# Patient Record
Sex: FEMALE | Race: BLACK OR AFRICAN AMERICAN | NOT HISPANIC OR LATINO | Employment: OTHER | URBAN - METROPOLITAN AREA
[De-identification: names, ages, dates, MRNs, and addresses within clinical notes are randomized per-mention and may not be internally consistent; named-entity substitution may affect disease eponyms.]

---

## 2020-09-25 ENCOUNTER — LAB VISIT (OUTPATIENT)
Dept: LAB | Facility: OTHER | Age: 60
End: 2020-09-25
Payer: MEDICARE

## 2020-09-25 DIAGNOSIS — Z03.818 ENCOUNTER FOR OBSERVATION FOR SUSPECTED EXPOSURE TO OTHER BIOLOGICAL AGENTS RULED OUT: ICD-10-CM

## 2020-09-25 PROCEDURE — U0003 INFECTIOUS AGENT DETECTION BY NUCLEIC ACID (DNA OR RNA); SEVERE ACUTE RESPIRATORY SYNDROME CORONAVIRUS 2 (SARS-COV-2) (CORONAVIRUS DISEASE [COVID-19]), AMPLIFIED PROBE TECHNIQUE, MAKING USE OF HIGH THROUGHPUT TECHNOLOGIES AS DESCRIBED BY CMS-2020-01-R: HCPCS

## 2020-09-26 LAB — SARS-COV-2 RNA RESP QL NAA+PROBE: NOT DETECTED

## 2020-10-02 ENCOUNTER — LAB VISIT (OUTPATIENT)
Dept: LAB | Facility: OTHER | Age: 60
End: 2020-10-02
Payer: OTHER GOVERNMENT

## 2020-10-02 DIAGNOSIS — Z03.818 ENCOUNTER FOR OBSERVATION FOR SUSPECTED EXPOSURE TO OTHER BIOLOGICAL AGENTS RULED OUT: ICD-10-CM

## 2020-10-02 PROCEDURE — U0003 INFECTIOUS AGENT DETECTION BY NUCLEIC ACID (DNA OR RNA); SEVERE ACUTE RESPIRATORY SYNDROME CORONAVIRUS 2 (SARS-COV-2) (CORONAVIRUS DISEASE [COVID-19]), AMPLIFIED PROBE TECHNIQUE, MAKING USE OF HIGH THROUGHPUT TECHNOLOGIES AS DESCRIBED BY CMS-2020-01-R: HCPCS

## 2020-10-04 LAB — SARS-COV-2 RNA RESP QL NAA+PROBE: NOT DETECTED

## 2020-10-09 ENCOUNTER — LAB VISIT (OUTPATIENT)
Dept: LAB | Facility: OTHER | Age: 60
End: 2020-10-09
Attending: INTERNAL MEDICINE
Payer: MEDICARE

## 2020-10-09 DIAGNOSIS — Z03.818 ENCOUNTER FOR OBSERVATION FOR SUSPECTED EXPOSURE TO OTHER BIOLOGICAL AGENTS RULED OUT: ICD-10-CM

## 2020-10-09 PROCEDURE — U0003 INFECTIOUS AGENT DETECTION BY NUCLEIC ACID (DNA OR RNA); SEVERE ACUTE RESPIRATORY SYNDROME CORONAVIRUS 2 (SARS-COV-2) (CORONAVIRUS DISEASE [COVID-19]), AMPLIFIED PROBE TECHNIQUE, MAKING USE OF HIGH THROUGHPUT TECHNOLOGIES AS DESCRIBED BY CMS-2020-01-R: HCPCS

## 2020-10-12 LAB — SARS-COV-2 RNA RESP QL NAA+PROBE: NOT DETECTED

## 2020-10-16 ENCOUNTER — LAB VISIT (OUTPATIENT)
Dept: LAB | Facility: OTHER | Age: 60
End: 2020-10-16
Payer: MEDICARE

## 2020-10-16 DIAGNOSIS — Z03.818 ENCOUNTER FOR OBSERVATION FOR SUSPECTED EXPOSURE TO OTHER BIOLOGICAL AGENTS RULED OUT: ICD-10-CM

## 2020-10-16 PROCEDURE — U0003 INFECTIOUS AGENT DETECTION BY NUCLEIC ACID (DNA OR RNA); SEVERE ACUTE RESPIRATORY SYNDROME CORONAVIRUS 2 (SARS-COV-2) (CORONAVIRUS DISEASE [COVID-19]), AMPLIFIED PROBE TECHNIQUE, MAKING USE OF HIGH THROUGHPUT TECHNOLOGIES AS DESCRIBED BY CMS-2020-01-R: HCPCS

## 2020-10-17 LAB — SARS-COV-2 RNA RESP QL NAA+PROBE: NOT DETECTED

## 2020-10-23 ENCOUNTER — LAB VISIT (OUTPATIENT)
Dept: LAB | Facility: OTHER | Age: 60
End: 2020-10-23
Attending: INTERNAL MEDICINE
Payer: MEDICARE

## 2020-10-23 DIAGNOSIS — Z03.818 ENCOUNTER FOR OBSERVATION FOR SUSPECTED EXPOSURE TO OTHER BIOLOGICAL AGENTS RULED OUT: ICD-10-CM

## 2020-10-23 PROCEDURE — U0003 INFECTIOUS AGENT DETECTION BY NUCLEIC ACID (DNA OR RNA); SEVERE ACUTE RESPIRATORY SYNDROME CORONAVIRUS 2 (SARS-COV-2) (CORONAVIRUS DISEASE [COVID-19]), AMPLIFIED PROBE TECHNIQUE, MAKING USE OF HIGH THROUGHPUT TECHNOLOGIES AS DESCRIBED BY CMS-2020-01-R: HCPCS

## 2020-10-24 LAB — SARS-COV-2 RNA RESP QL NAA+PROBE: NOT DETECTED

## 2020-10-30 ENCOUNTER — LAB VISIT (OUTPATIENT)
Dept: LAB | Facility: OTHER | Age: 60
End: 2020-10-30
Payer: MEDICARE

## 2020-10-30 DIAGNOSIS — Z03.818 ENCOUNTER FOR OBSERVATION FOR SUSPECTED EXPOSURE TO OTHER BIOLOGICAL AGENTS RULED OUT: ICD-10-CM

## 2020-10-30 PROCEDURE — U0003 INFECTIOUS AGENT DETECTION BY NUCLEIC ACID (DNA OR RNA); SEVERE ACUTE RESPIRATORY SYNDROME CORONAVIRUS 2 (SARS-COV-2) (CORONAVIRUS DISEASE [COVID-19]), AMPLIFIED PROBE TECHNIQUE, MAKING USE OF HIGH THROUGHPUT TECHNOLOGIES AS DESCRIBED BY CMS-2020-01-R: HCPCS

## 2020-10-31 LAB — SARS-COV-2 RNA RESP QL NAA+PROBE: NOT DETECTED

## 2020-11-27 ENCOUNTER — LAB VISIT (OUTPATIENT)
Dept: LAB | Facility: OTHER | Age: 60
End: 2020-11-27
Payer: MEDICARE

## 2020-11-27 DIAGNOSIS — Z03.818 ENCOUNTER FOR OBSERVATION FOR SUSPECTED EXPOSURE TO OTHER BIOLOGICAL AGENTS RULED OUT: ICD-10-CM

## 2020-11-27 PROCEDURE — U0003 INFECTIOUS AGENT DETECTION BY NUCLEIC ACID (DNA OR RNA); SEVERE ACUTE RESPIRATORY SYNDROME CORONAVIRUS 2 (SARS-COV-2) (CORONAVIRUS DISEASE [COVID-19]), AMPLIFIED PROBE TECHNIQUE, MAKING USE OF HIGH THROUGHPUT TECHNOLOGIES AS DESCRIBED BY CMS-2020-01-R: HCPCS

## 2020-11-28 LAB — SARS-COV-2 RNA RESP QL NAA+PROBE: NOT DETECTED

## 2020-12-04 ENCOUNTER — LAB VISIT (OUTPATIENT)
Dept: LAB | Facility: OTHER | Age: 60
End: 2020-12-04
Payer: MEDICARE

## 2020-12-04 DIAGNOSIS — Z03.818 ENCOUNTER FOR OBSERVATION FOR SUSPECTED EXPOSURE TO OTHER BIOLOGICAL AGENTS RULED OUT: ICD-10-CM

## 2020-12-04 PROCEDURE — U0003 INFECTIOUS AGENT DETECTION BY NUCLEIC ACID (DNA OR RNA); SEVERE ACUTE RESPIRATORY SYNDROME CORONAVIRUS 2 (SARS-COV-2) (CORONAVIRUS DISEASE [COVID-19]), AMPLIFIED PROBE TECHNIQUE, MAKING USE OF HIGH THROUGHPUT TECHNOLOGIES AS DESCRIBED BY CMS-2020-01-R: HCPCS

## 2020-12-06 LAB — SARS-COV-2 RNA RESP QL NAA+PROBE: NOT DETECTED

## 2020-12-11 ENCOUNTER — LAB VISIT (OUTPATIENT)
Dept: LAB | Facility: OTHER | Age: 60
End: 2020-12-11
Payer: OTHER GOVERNMENT

## 2020-12-11 DIAGNOSIS — Z03.818 ENCOUNTER FOR OBSERVATION FOR SUSPECTED EXPOSURE TO OTHER BIOLOGICAL AGENTS RULED OUT: ICD-10-CM

## 2020-12-11 PROCEDURE — U0003 INFECTIOUS AGENT DETECTION BY NUCLEIC ACID (DNA OR RNA); SEVERE ACUTE RESPIRATORY SYNDROME CORONAVIRUS 2 (SARS-COV-2) (CORONAVIRUS DISEASE [COVID-19]), AMPLIFIED PROBE TECHNIQUE, MAKING USE OF HIGH THROUGHPUT TECHNOLOGIES AS DESCRIBED BY CMS-2020-01-R: HCPCS

## 2020-12-14 LAB — SARS-COV-2 RNA RESP QL NAA+PROBE: NOT DETECTED

## 2020-12-18 ENCOUNTER — LAB VISIT (OUTPATIENT)
Dept: LAB | Facility: OTHER | Age: 60
End: 2020-12-18
Payer: OTHER GOVERNMENT

## 2020-12-18 DIAGNOSIS — Z03.818 ENCOUNTER FOR OBSERVATION FOR SUSPECTED EXPOSURE TO OTHER BIOLOGICAL AGENTS RULED OUT: ICD-10-CM

## 2020-12-18 PROCEDURE — U0003 INFECTIOUS AGENT DETECTION BY NUCLEIC ACID (DNA OR RNA); SEVERE ACUTE RESPIRATORY SYNDROME CORONAVIRUS 2 (SARS-COV-2) (CORONAVIRUS DISEASE [COVID-19]), AMPLIFIED PROBE TECHNIQUE, MAKING USE OF HIGH THROUGHPUT TECHNOLOGIES AS DESCRIBED BY CMS-2020-01-R: HCPCS

## 2020-12-20 LAB — SARS-COV-2 RNA RESP QL NAA+PROBE: NOT DETECTED

## 2020-12-23 ENCOUNTER — LAB VISIT (OUTPATIENT)
Dept: LAB | Facility: OTHER | Age: 60
End: 2020-12-23
Payer: OTHER GOVERNMENT

## 2020-12-23 DIAGNOSIS — Z03.818 ENCOUNTER FOR OBSERVATION FOR SUSPECTED EXPOSURE TO OTHER BIOLOGICAL AGENTS RULED OUT: ICD-10-CM

## 2020-12-23 PROCEDURE — U0003 INFECTIOUS AGENT DETECTION BY NUCLEIC ACID (DNA OR RNA); SEVERE ACUTE RESPIRATORY SYNDROME CORONAVIRUS 2 (SARS-COV-2) (CORONAVIRUS DISEASE [COVID-19]), AMPLIFIED PROBE TECHNIQUE, MAKING USE OF HIGH THROUGHPUT TECHNOLOGIES AS DESCRIBED BY CMS-2020-01-R: HCPCS

## 2020-12-25 LAB — SARS-COV-2 RNA RESP QL NAA+PROBE: NOT DETECTED

## 2020-12-30 ENCOUNTER — LAB VISIT (OUTPATIENT)
Dept: LAB | Facility: OTHER | Age: 60
End: 2020-12-30
Payer: MEDICARE

## 2020-12-30 DIAGNOSIS — Z03.818 ENCOUNTER FOR OBSERVATION FOR SUSPECTED EXPOSURE TO OTHER BIOLOGICAL AGENTS RULED OUT: ICD-10-CM

## 2020-12-30 PROCEDURE — U0003 INFECTIOUS AGENT DETECTION BY NUCLEIC ACID (DNA OR RNA); SEVERE ACUTE RESPIRATORY SYNDROME CORONAVIRUS 2 (SARS-COV-2) (CORONAVIRUS DISEASE [COVID-19]), AMPLIFIED PROBE TECHNIQUE, MAKING USE OF HIGH THROUGHPUT TECHNOLOGIES AS DESCRIBED BY CMS-2020-01-R: HCPCS

## 2021-01-01 LAB — SARS-COV-2 RNA RESP QL NAA+PROBE: NOT DETECTED

## 2021-01-08 ENCOUNTER — LAB VISIT (OUTPATIENT)
Dept: LAB | Facility: OTHER | Age: 61
End: 2021-01-08
Payer: MEDICARE

## 2021-01-08 DIAGNOSIS — Z03.818 ENCOUNTER FOR OBSERVATION FOR SUSPECTED EXPOSURE TO OTHER BIOLOGICAL AGENTS RULED OUT: ICD-10-CM

## 2021-01-08 PROCEDURE — U0003 INFECTIOUS AGENT DETECTION BY NUCLEIC ACID (DNA OR RNA); SEVERE ACUTE RESPIRATORY SYNDROME CORONAVIRUS 2 (SARS-COV-2) (CORONAVIRUS DISEASE [COVID-19]), AMPLIFIED PROBE TECHNIQUE, MAKING USE OF HIGH THROUGHPUT TECHNOLOGIES AS DESCRIBED BY CMS-2020-01-R: HCPCS

## 2021-01-10 LAB — SARS-COV-2 RNA RESP QL NAA+PROBE: NOT DETECTED

## 2021-01-15 ENCOUNTER — LAB VISIT (OUTPATIENT)
Dept: LAB | Facility: OTHER | Age: 61
End: 2021-01-15
Payer: MEDICARE

## 2021-01-15 DIAGNOSIS — Z20.822 ENCOUNTER FOR LABORATORY TESTING FOR COVID-19 VIRUS: ICD-10-CM

## 2021-01-15 PROCEDURE — U0003 INFECTIOUS AGENT DETECTION BY NUCLEIC ACID (DNA OR RNA); SEVERE ACUTE RESPIRATORY SYNDROME CORONAVIRUS 2 (SARS-COV-2) (CORONAVIRUS DISEASE [COVID-19]), AMPLIFIED PROBE TECHNIQUE, MAKING USE OF HIGH THROUGHPUT TECHNOLOGIES AS DESCRIBED BY CMS-2020-01-R: HCPCS

## 2021-01-16 LAB — SARS-COV-2 RNA RESP QL NAA+PROBE: NOT DETECTED

## 2021-01-22 ENCOUNTER — LAB VISIT (OUTPATIENT)
Dept: LAB | Facility: OTHER | Age: 61
End: 2021-01-22
Payer: MEDICARE

## 2021-01-22 DIAGNOSIS — Z20.822 ENCOUNTER FOR LABORATORY TESTING FOR COVID-19 VIRUS: ICD-10-CM

## 2021-01-22 PROCEDURE — U0003 INFECTIOUS AGENT DETECTION BY NUCLEIC ACID (DNA OR RNA); SEVERE ACUTE RESPIRATORY SYNDROME CORONAVIRUS 2 (SARS-COV-2) (CORONAVIRUS DISEASE [COVID-19]), AMPLIFIED PROBE TECHNIQUE, MAKING USE OF HIGH THROUGHPUT TECHNOLOGIES AS DESCRIBED BY CMS-2020-01-R: HCPCS

## 2021-01-23 LAB — SARS-COV-2 RNA RESP QL NAA+PROBE: NOT DETECTED

## 2021-04-21 ENCOUNTER — HOSPITAL ENCOUNTER (INPATIENT)
Facility: HOSPITAL | Age: 61
LOS: 14 days | Discharge: HOME | DRG: 871 | End: 2021-05-05
Attending: EMERGENCY MEDICINE | Admitting: INTERNAL MEDICINE
Payer: MEDICARE

## 2021-04-21 DIAGNOSIS — Z93.0 TRACHEOSTOMY DEPENDENT: ICD-10-CM

## 2021-04-21 DIAGNOSIS — T17.908A ASPIRATION INTO AIRWAY: ICD-10-CM

## 2021-04-21 DIAGNOSIS — T17.908A ASPIRATION INTO AIRWAY, INITIAL ENCOUNTER: ICD-10-CM

## 2021-04-21 DIAGNOSIS — J69.0 ASPIRATION PNEUMONIA OF RIGHT LUNG, UNSPECIFIED ASPIRATION PNEUMONIA TYPE, UNSPECIFIED PART OF LUNG: Primary | ICD-10-CM

## 2021-04-21 PROBLEM — D63.8 ANEMIA OF CHRONIC DISEASE: Status: ACTIVE | Noted: 2021-04-21

## 2021-04-21 PROBLEM — G93.1 ANOXIC BRAIN INJURY: Status: ACTIVE | Noted: 2021-04-21

## 2021-04-21 LAB
ALBUMIN SERPL BCP-MCNC: 3 G/DL (ref 3.5–5.2)
ALP SERPL-CCNC: 238 U/L (ref 55–135)
ALT SERPL W/O P-5'-P-CCNC: 77 U/L (ref 10–44)
ANION GAP SERPL CALC-SCNC: 8 MMOL/L (ref 8–16)
AST SERPL-CCNC: 48 U/L (ref 10–40)
BACTERIA #/AREA URNS HPF: ABNORMAL /HPF
BASOPHILS # BLD AUTO: 0.01 K/UL (ref 0–0.2)
BASOPHILS NFR BLD: 0.1 % (ref 0–1.9)
BILIRUB SERPL-MCNC: 0.7 MG/DL (ref 0.1–1)
BILIRUB UR QL STRIP: NEGATIVE
BUN SERPL-MCNC: 36 MG/DL (ref 6–20)
CALCIUM SERPL-MCNC: 9.9 MG/DL (ref 8.7–10.5)
CAOX CRY URNS QL MICRO: ABNORMAL
CHLORIDE SERPL-SCNC: 107 MMOL/L (ref 95–110)
CLARITY UR: ABNORMAL
CO2 SERPL-SCNC: 28 MMOL/L (ref 23–29)
COLOR UR: ABNORMAL
CREAT SERPL-MCNC: 0.7 MG/DL (ref 0.5–1.4)
CTP QC/QA: YES
DIFFERENTIAL METHOD: ABNORMAL
EOSINOPHIL # BLD AUTO: 0.1 K/UL (ref 0–0.5)
EOSINOPHIL NFR BLD: 0.7 % (ref 0–8)
ERYTHROCYTE [DISTWIDTH] IN BLOOD BY AUTOMATED COUNT: 17 % (ref 11.5–14.5)
EST. GFR  (AFRICAN AMERICAN): >60 ML/MIN/1.73 M^2
EST. GFR  (NON AFRICAN AMERICAN): >60 ML/MIN/1.73 M^2
GLUCOSE SERPL-MCNC: 73 MG/DL (ref 70–110)
GLUCOSE UR QL STRIP: NEGATIVE
HCT VFR BLD AUTO: 37.3 % (ref 37–48.5)
HGB BLD-MCNC: 11.8 G/DL (ref 12–16)
HGB UR QL STRIP: ABNORMAL
HYALINE CASTS #/AREA URNS LPF: 0 /LPF
IMM GRANULOCYTES # BLD AUTO: 0.04 K/UL (ref 0–0.04)
IMM GRANULOCYTES NFR BLD AUTO: 0.5 % (ref 0–0.5)
KETONES UR QL STRIP: NEGATIVE
LACTATE SERPL-SCNC: 0.9 MMOL/L (ref 0.5–2.2)
LACTATE SERPL-SCNC: 1.7 MMOL/L (ref 0.5–2.2)
LEUKOCYTE ESTERASE UR QL STRIP: ABNORMAL
LYMPHOCYTES # BLD AUTO: 0.9 K/UL (ref 1–4.8)
LYMPHOCYTES NFR BLD: 12.4 % (ref 18–48)
MCH RBC QN AUTO: 28.7 PG (ref 27–31)
MCHC RBC AUTO-ENTMCNC: 31.6 G/DL (ref 32–36)
MCV RBC AUTO: 91 FL (ref 82–98)
MICROSCOPIC COMMENT: ABNORMAL
MONOCYTES # BLD AUTO: 0.6 K/UL (ref 0.3–1)
MONOCYTES NFR BLD: 8 % (ref 4–15)
NEUTROPHILS # BLD AUTO: 5.8 K/UL (ref 1.8–7.7)
NEUTROPHILS NFR BLD: 78.3 % (ref 38–73)
NITRITE UR QL STRIP: NEGATIVE
NRBC BLD-RTO: 0 /100 WBC
PH UR STRIP: >8 [PH] (ref 5–8)
PLATELET # BLD AUTO: 157 K/UL (ref 150–450)
PMV BLD AUTO: 13 FL (ref 9.2–12.9)
POCT GLUCOSE: 81 MG/DL (ref 70–110)
POTASSIUM SERPL-SCNC: 4.4 MMOL/L (ref 3.5–5.1)
PROCALCITONIN SERPL IA-MCNC: 0.28 NG/ML
PROT SERPL-MCNC: 8.7 G/DL (ref 6–8.4)
PROT UR QL STRIP: ABNORMAL
RBC # BLD AUTO: 4.11 M/UL (ref 4–5.4)
RBC #/AREA URNS HPF: >100 /HPF (ref 0–4)
SARS-COV-2 RDRP RESP QL NAA+PROBE: NEGATIVE
SODIUM SERPL-SCNC: 143 MMOL/L (ref 136–145)
SP GR UR STRIP: 1.01 (ref 1–1.03)
TSH SERPL DL<=0.005 MIU/L-ACNC: 0.91 UIU/ML (ref 0.4–4)
URN SPEC COLLECT METH UR: ABNORMAL
UROBILINOGEN UR STRIP-ACNC: ABNORMAL EU/DL
WBC # BLD AUTO: 7.47 K/UL (ref 3.9–12.7)
WBC #/AREA URNS HPF: 94 /HPF (ref 0–5)
WBC CLUMPS URNS QL MICRO: ABNORMAL
YEAST URNS QL MICRO: ABNORMAL

## 2021-04-21 PROCEDURE — 87324 CLOSTRIDIUM AG IA: CPT | Performed by: EMERGENCY MEDICINE

## 2021-04-21 PROCEDURE — 87040 BLOOD CULTURE FOR BACTERIA: CPT | Performed by: EMERGENCY MEDICINE

## 2021-04-21 PROCEDURE — 51702 INSERT TEMP BLADDER CATH: CPT

## 2021-04-21 PROCEDURE — 27201112

## 2021-04-21 PROCEDURE — 99291 CRITICAL CARE FIRST HOUR: CPT | Mod: 25

## 2021-04-21 PROCEDURE — 63600175 PHARM REV CODE 636 W HCPCS: Performed by: INTERNAL MEDICINE

## 2021-04-21 PROCEDURE — 99900026 HC AIRWAY MAINTENANCE (STAT)

## 2021-04-21 PROCEDURE — 84100 ASSAY OF PHOSPHORUS: CPT | Performed by: INTERNAL MEDICINE

## 2021-04-21 PROCEDURE — 25000242 PHARM REV CODE 250 ALT 637 W/ HCPCS: Performed by: INTERNAL MEDICINE

## 2021-04-21 PROCEDURE — 81000 URINALYSIS NONAUTO W/SCOPE: CPT | Performed by: EMERGENCY MEDICINE

## 2021-04-21 PROCEDURE — 93010 ELECTROCARDIOGRAM REPORT: CPT | Mod: ,,, | Performed by: INTERNAL MEDICINE

## 2021-04-21 PROCEDURE — 87088 URINE BACTERIA CULTURE: CPT | Performed by: EMERGENCY MEDICINE

## 2021-04-21 PROCEDURE — 94640 AIRWAY INHALATION TREATMENT: CPT

## 2021-04-21 PROCEDURE — 94761 N-INVAS EAR/PLS OXIMETRY MLT: CPT

## 2021-04-21 PROCEDURE — 25000003 PHARM REV CODE 250: Performed by: EMERGENCY MEDICINE

## 2021-04-21 PROCEDURE — 27000221 HC OXYGEN, UP TO 24 HOURS

## 2021-04-21 PROCEDURE — 87449 NOS EACH ORGANISM AG IA: CPT | Performed by: EMERGENCY MEDICINE

## 2021-04-21 PROCEDURE — 93010 EKG 12-LEAD: ICD-10-PCS | Mod: ,,, | Performed by: INTERNAL MEDICINE

## 2021-04-21 PROCEDURE — 25000003 PHARM REV CODE 250: Performed by: INTERNAL MEDICINE

## 2021-04-21 PROCEDURE — 96360 HYDRATION IV INFUSION INIT: CPT | Mod: 59

## 2021-04-21 PROCEDURE — 83605 ASSAY OF LACTIC ACID: CPT | Performed by: EMERGENCY MEDICINE

## 2021-04-21 PROCEDURE — 63600175 PHARM REV CODE 636 W HCPCS: Performed by: EMERGENCY MEDICINE

## 2021-04-21 PROCEDURE — 83605 ASSAY OF LACTIC ACID: CPT | Mod: 91 | Performed by: INTERNAL MEDICINE

## 2021-04-21 PROCEDURE — 96367 TX/PROPH/DG ADDL SEQ IV INF: CPT

## 2021-04-21 PROCEDURE — 96365 THER/PROPH/DIAG IV INF INIT: CPT

## 2021-04-21 PROCEDURE — 87077 CULTURE AEROBIC IDENTIFY: CPT | Performed by: EMERGENCY MEDICINE

## 2021-04-21 PROCEDURE — 84145 PROCALCITONIN (PCT): CPT | Performed by: EMERGENCY MEDICINE

## 2021-04-21 PROCEDURE — U0002 COVID-19 LAB TEST NON-CDC: HCPCS | Performed by: EMERGENCY MEDICINE

## 2021-04-21 PROCEDURE — 25000242 PHARM REV CODE 250 ALT 637 W/ HCPCS: Performed by: EMERGENCY MEDICINE

## 2021-04-21 PROCEDURE — 87086 URINE CULTURE/COLONY COUNT: CPT | Performed by: EMERGENCY MEDICINE

## 2021-04-21 PROCEDURE — 84443 ASSAY THYROID STIM HORMONE: CPT | Performed by: INTERNAL MEDICINE

## 2021-04-21 PROCEDURE — 27200966 HC CLOSED SUCTION SYSTEM

## 2021-04-21 PROCEDURE — 80053 COMPREHEN METABOLIC PANEL: CPT | Performed by: EMERGENCY MEDICINE

## 2021-04-21 PROCEDURE — 93005 ELECTROCARDIOGRAM TRACING: CPT

## 2021-04-21 PROCEDURE — 12000002 HC ACUTE/MED SURGE SEMI-PRIVATE ROOM

## 2021-04-21 PROCEDURE — 82962 GLUCOSE BLOOD TEST: CPT

## 2021-04-21 PROCEDURE — 85025 COMPLETE CBC W/AUTO DIFF WBC: CPT | Performed by: EMERGENCY MEDICINE

## 2021-04-21 PROCEDURE — 87186 SC STD MICRODIL/AGAR DIL: CPT | Performed by: EMERGENCY MEDICINE

## 2021-04-21 PROCEDURE — 83735 ASSAY OF MAGNESIUM: CPT | Performed by: INTERNAL MEDICINE

## 2021-04-21 RX ORDER — POLYETHYLENE GLYCOL 3350 17 G/17G
17 POWDER, FOR SOLUTION ORAL DAILY PRN
COMMUNITY

## 2021-04-21 RX ORDER — IPRATROPIUM BROMIDE AND ALBUTEROL SULFATE 2.5; .5 MG/3ML; MG/3ML
3 SOLUTION RESPIRATORY (INHALATION) EVERY 6 HOURS PRN
Status: DISCONTINUED | OUTPATIENT
Start: 2021-04-21 | End: 2021-04-23

## 2021-04-21 RX ORDER — MUPIROCIN 20 MG/G
OINTMENT TOPICAL 2 TIMES DAILY
Status: COMPLETED | OUTPATIENT
Start: 2021-04-21 | End: 2021-04-26

## 2021-04-21 RX ORDER — SCOLOPAMINE TRANSDERMAL SYSTEM 1 MG/1
1 PATCH, EXTENDED RELEASE TRANSDERMAL
Status: DISCONTINUED | OUTPATIENT
Start: 2021-04-21 | End: 2021-05-06 | Stop reason: HOSPADM

## 2021-04-21 RX ORDER — FERROUS SULFATE 300 MG/5ML
300 LIQUID (ML) ORAL SEE ADMIN INSTRUCTIONS
Status: ON HOLD | COMMUNITY
End: 2023-08-01 | Stop reason: HOSPADM

## 2021-04-21 RX ORDER — LANOLIN ALCOHOL/MO/W.PET/CERES
400 CREAM (GRAM) TOPICAL DAILY
Status: ON HOLD | COMMUNITY
End: 2021-05-13 | Stop reason: HOSPADM

## 2021-04-21 RX ORDER — ERGOCALCIFEROL 1.25 MG/1
50000 CAPSULE ORAL
COMMUNITY

## 2021-04-21 RX ORDER — SCOLOPAMINE TRANSDERMAL SYSTEM 1 MG/1
1 PATCH, EXTENDED RELEASE TRANSDERMAL
Status: ON HOLD | COMMUNITY
End: 2021-05-13 | Stop reason: HOSPADM

## 2021-04-21 RX ORDER — VANCOMYCIN HCL IN 5 % DEXTROSE 1G/250ML
1000 PLASTIC BAG, INJECTION (ML) INTRAVENOUS
Status: DISCONTINUED | OUTPATIENT
Start: 2021-04-21 | End: 2021-04-24

## 2021-04-21 RX ORDER — DEXTROSE MONOHYDRATE AND SODIUM CHLORIDE 5; .9 G/100ML; G/100ML
INJECTION, SOLUTION INTRAVENOUS CONTINUOUS
Status: DISCONTINUED | OUTPATIENT
Start: 2021-04-21 | End: 2021-04-24

## 2021-04-21 RX ORDER — IPRATROPIUM BROMIDE AND ALBUTEROL SULFATE 2.5; .5 MG/3ML; MG/3ML
3 SOLUTION RESPIRATORY (INHALATION) EVERY 6 HOURS PRN
Status: ON HOLD | COMMUNITY
End: 2021-05-13 | Stop reason: SDUPTHER

## 2021-04-21 RX ORDER — ACETYLCYSTEINE 100 MG/ML
4 SOLUTION ORAL; RESPIRATORY (INHALATION) 4 TIMES DAILY
Status: DISCONTINUED | OUTPATIENT
Start: 2021-04-21 | End: 2021-04-23

## 2021-04-21 RX ORDER — LANOLIN ALCOHOL/MO/W.PET/CERES
400 CREAM (GRAM) TOPICAL DAILY
Status: DISCONTINUED | OUTPATIENT
Start: 2021-04-21 | End: 2021-05-06 | Stop reason: HOSPADM

## 2021-04-21 RX ORDER — ACETYLCYSTEINE 100 MG/ML
4 SOLUTION ORAL; RESPIRATORY (INHALATION) EVERY 8 HOURS PRN
Status: ON HOLD | COMMUNITY
End: 2021-05-13 | Stop reason: SDUPTHER

## 2021-04-21 RX ORDER — IPRATROPIUM BROMIDE AND ALBUTEROL SULFATE 2.5; .5 MG/3ML; MG/3ML
3 SOLUTION RESPIRATORY (INHALATION)
Status: COMPLETED | OUTPATIENT
Start: 2021-04-21 | End: 2021-04-21

## 2021-04-21 RX ORDER — ENOXAPARIN SODIUM 100 MG/ML
40 INJECTION SUBCUTANEOUS EVERY 24 HOURS
Status: DISCONTINUED | OUTPATIENT
Start: 2021-04-21 | End: 2021-05-06 | Stop reason: HOSPADM

## 2021-04-21 RX ADMIN — SCOPOLAMINE 1 PATCH: 1.5 PATCH, EXTENDED RELEASE TRANSDERMAL at 05:04

## 2021-04-21 RX ADMIN — IPRATROPIUM BROMIDE AND ALBUTEROL SULFATE 3 ML: .5; 3 SOLUTION RESPIRATORY (INHALATION) at 10:04

## 2021-04-21 RX ADMIN — PIPERACILLIN AND TAZOBACTAM 4.5 G: 4; .5 INJECTION, POWDER, LYOPHILIZED, FOR SOLUTION INTRAVENOUS; PARENTERAL at 09:04

## 2021-04-21 RX ADMIN — VANCOMYCIN HYDROCHLORIDE 1000 MG: 1 INJECTION, POWDER, LYOPHILIZED, FOR SOLUTION INTRAVENOUS at 11:04

## 2021-04-21 RX ADMIN — PIPERACILLIN AND TAZOBACTAM 4.5 G: 4; .5 INJECTION, POWDER, LYOPHILIZED, FOR SOLUTION INTRAVENOUS; PARENTERAL at 05:04

## 2021-04-21 RX ADMIN — IPRATROPIUM BROMIDE AND ALBUTEROL SULFATE 3 ML: .5; 3 SOLUTION RESPIRATORY (INHALATION) at 06:04

## 2021-04-21 RX ADMIN — ACETYLCYSTEINE 4 ML: 100 INHALANT RESPIRATORY (INHALATION) at 06:04

## 2021-04-21 RX ADMIN — MUPIROCIN: 20 OINTMENT TOPICAL at 11:04

## 2021-04-21 RX ADMIN — ACETYLCYSTEINE 4 ML: 100 INHALANT RESPIRATORY (INHALATION) at 11:04

## 2021-04-21 RX ADMIN — DEXTROSE AND SODIUM CHLORIDE: 5; .9 INJECTION, SOLUTION INTRAVENOUS at 08:04

## 2021-04-21 RX ADMIN — IPRATROPIUM BROMIDE AND ALBUTEROL SULFATE 3 ML: .5; 3 SOLUTION RESPIRATORY (INHALATION) at 11:04

## 2021-04-21 RX ADMIN — ENOXAPARIN SODIUM 40 MG: 40 INJECTION SUBCUTANEOUS at 05:04

## 2021-04-21 RX ADMIN — SODIUM CHLORIDE, SODIUM LACTATE, POTASSIUM CHLORIDE, AND CALCIUM CHLORIDE 1905 ML: .6; .31; .03; .02 INJECTION, SOLUTION INTRAVENOUS at 09:04

## 2021-04-21 RX ADMIN — VANCOMYCIN HYDROCHLORIDE 1250 MG: 1.25 INJECTION, POWDER, LYOPHILIZED, FOR SOLUTION INTRAVENOUS at 10:04

## 2021-04-22 PROBLEM — Z71.89 ADVANCE CARE PLANNING: Status: ACTIVE | Noted: 2021-04-22

## 2021-04-22 PROBLEM — R57.9 SHOCK: Status: ACTIVE | Noted: 2021-04-22

## 2021-04-22 PROBLEM — N39.0 UTI (URINARY TRACT INFECTION): Status: RESOLVED | Noted: 2021-04-22 | Resolved: 2021-04-22

## 2021-04-22 PROBLEM — J96.01 ACUTE RESPIRATORY FAILURE WITH HYPOXIA: Status: ACTIVE | Noted: 2021-04-22

## 2021-04-22 PROBLEM — S43.015A ANTERIOR DISLOCATION OF LEFT SHOULDER: Status: ACTIVE | Noted: 2021-04-22

## 2021-04-22 PROBLEM — N30.00 ACUTE CYSTITIS: Status: ACTIVE | Noted: 2021-04-22

## 2021-04-22 PROBLEM — N39.0 UTI (URINARY TRACT INFECTION): Status: ACTIVE | Noted: 2021-04-22

## 2021-04-22 LAB
ALLENS TEST: ABNORMAL
ANION GAP SERPL CALC-SCNC: 8 MMOL/L (ref 8–16)
BASOPHILS # BLD AUTO: 0.01 K/UL (ref 0–0.2)
BASOPHILS NFR BLD: 0.2 % (ref 0–1.9)
BUN SERPL-MCNC: 26 MG/DL (ref 6–20)
C DIFF GDH STL QL: NEGATIVE
C DIFF TOX A+B STL QL IA: NEGATIVE
CALCIUM SERPL-MCNC: 9.4 MG/DL (ref 8.7–10.5)
CHLORIDE SERPL-SCNC: 107 MMOL/L (ref 95–110)
CO2 SERPL-SCNC: 27 MMOL/L (ref 23–29)
CREAT SERPL-MCNC: 0.6 MG/DL (ref 0.5–1.4)
DELSYS: ABNORMAL
DIFFERENTIAL METHOD: ABNORMAL
EOSINOPHIL # BLD AUTO: 0.1 K/UL (ref 0–0.5)
EOSINOPHIL NFR BLD: 1.3 % (ref 0–8)
ERYTHROCYTE [DISTWIDTH] IN BLOOD BY AUTOMATED COUNT: 17 % (ref 11.5–14.5)
ERYTHROCYTE [SEDIMENTATION RATE] IN BLOOD BY WESTERGREN METHOD: 15 MM/H
EST. GFR  (AFRICAN AMERICAN): >60 ML/MIN/1.73 M^2
EST. GFR  (NON AFRICAN AMERICAN): >60 ML/MIN/1.73 M^2
FIO2: 28
FLOW: 5
GLUCOSE SERPL-MCNC: 79 MG/DL (ref 70–110)
HCO3 UR-SCNC: 28.1 MMOL/L (ref 24–28)
HCT VFR BLD AUTO: 29.4 % (ref 37–48.5)
HGB BLD-MCNC: 9.4 G/DL (ref 12–16)
IMM GRANULOCYTES # BLD AUTO: 0.01 K/UL (ref 0–0.04)
IMM GRANULOCYTES NFR BLD AUTO: 0.2 % (ref 0–0.5)
LYMPHOCYTES # BLD AUTO: 0.7 K/UL (ref 1–4.8)
LYMPHOCYTES NFR BLD: 12.4 % (ref 18–48)
MAGNESIUM SERPL-MCNC: 1.9 MG/DL (ref 1.6–2.6)
MCH RBC QN AUTO: 28.8 PG (ref 27–31)
MCHC RBC AUTO-ENTMCNC: 32 G/DL (ref 32–36)
MCV RBC AUTO: 90 FL (ref 82–98)
MODE: ABNORMAL
MONOCYTES # BLD AUTO: 0.5 K/UL (ref 0.3–1)
MONOCYTES NFR BLD: 8.5 % (ref 4–15)
NEUTROPHILS # BLD AUTO: 4.1 K/UL (ref 1.8–7.7)
NEUTROPHILS NFR BLD: 77.4 % (ref 38–73)
NRBC BLD-RTO: 1 /100 WBC
PCO2 BLDA: 42.1 MMHG (ref 35–45)
PH SMN: 7.43 [PH] (ref 7.35–7.45)
PHOSPHATE SERPL-MCNC: 3.2 MG/DL (ref 2.7–4.5)
PLATELET # BLD AUTO: 135 K/UL (ref 150–450)
PMV BLD AUTO: 11.6 FL (ref 9.2–12.9)
PO2 BLDA: 66 MMHG (ref 40–60)
POC BE: 3 MMOL/L
POC SATURATED O2: 93 % (ref 95–100)
POC TCO2: 29 MMOL/L (ref 24–29)
POCT GLUCOSE: 132 MG/DL (ref 70–110)
POTASSIUM SERPL-SCNC: 4.1 MMOL/L (ref 3.5–5.1)
RBC # BLD AUTO: 3.26 M/UL (ref 4–5.4)
SAMPLE: ABNORMAL
SITE: ABNORMAL
SODIUM SERPL-SCNC: 142 MMOL/L (ref 136–145)
SP02: 93
VANCOMYCIN TROUGH SERPL-MCNC: 18.7 UG/ML (ref 10–22)
WBC # BLD AUTO: 5.31 K/UL (ref 3.9–12.7)

## 2021-04-22 PROCEDURE — 99900035 HC TECH TIME PER 15 MIN (STAT)

## 2021-04-22 PROCEDURE — 99291 CRITICAL CARE FIRST HOUR: CPT | Mod: ,,, | Performed by: INTERNAL MEDICINE

## 2021-04-22 PROCEDURE — C1751 CATH, INF, PER/CENT/MIDLINE: HCPCS

## 2021-04-22 PROCEDURE — 94761 N-INVAS EAR/PLS OXIMETRY MLT: CPT

## 2021-04-22 PROCEDURE — 27000221 HC OXYGEN, UP TO 24 HOURS

## 2021-04-22 PROCEDURE — 25000003 PHARM REV CODE 250: Performed by: INTERNAL MEDICINE

## 2021-04-22 PROCEDURE — 63600175 PHARM REV CODE 636 W HCPCS: Performed by: INTERNAL MEDICINE

## 2021-04-22 PROCEDURE — 99223 PR INITIAL HOSPITAL CARE,LEVL III: ICD-10-PCS | Mod: ,,, | Performed by: INTERNAL MEDICINE

## 2021-04-22 PROCEDURE — 25000242 PHARM REV CODE 250 ALT 637 W/ HCPCS: Performed by: INTERNAL MEDICINE

## 2021-04-22 PROCEDURE — 99497 PR ADVNCD CARE PLAN 30 MIN: ICD-10-PCS | Mod: 25,,, | Performed by: INTERNAL MEDICINE

## 2021-04-22 PROCEDURE — 99291 PR CRITICAL CARE, E/M 30-74 MINUTES: ICD-10-PCS | Mod: ,,, | Performed by: INTERNAL MEDICINE

## 2021-04-22 PROCEDURE — 80048 BASIC METABOLIC PNL TOTAL CA: CPT | Performed by: INTERNAL MEDICINE

## 2021-04-22 PROCEDURE — 20000000 HC ICU ROOM

## 2021-04-22 PROCEDURE — 94002 VENT MGMT INPAT INIT DAY: CPT

## 2021-04-22 PROCEDURE — 94640 AIRWAY INHALATION TREATMENT: CPT

## 2021-04-22 PROCEDURE — 99223 1ST HOSP IP/OBS HIGH 75: CPT | Mod: ,,, | Performed by: INTERNAL MEDICINE

## 2021-04-22 PROCEDURE — 25000003 PHARM REV CODE 250: Performed by: STUDENT IN AN ORGANIZED HEALTH CARE EDUCATION/TRAINING PROGRAM

## 2021-04-22 PROCEDURE — 27200966 HC CLOSED SUCTION SYSTEM

## 2021-04-22 PROCEDURE — A4216 STERILE WATER/SALINE, 10 ML: HCPCS | Performed by: STUDENT IN AN ORGANIZED HEALTH CARE EDUCATION/TRAINING PROGRAM

## 2021-04-22 PROCEDURE — 25000003 PHARM REV CODE 250: Performed by: EMERGENCY MEDICINE

## 2021-04-22 PROCEDURE — 99900026 HC AIRWAY MAINTENANCE (STAT)

## 2021-04-22 PROCEDURE — 36569 INSJ PICC 5 YR+ W/O IMAGING: CPT

## 2021-04-22 PROCEDURE — 80202 ASSAY OF VANCOMYCIN: CPT | Performed by: EMERGENCY MEDICINE

## 2021-04-22 PROCEDURE — 82803 BLOOD GASES ANY COMBINATION: CPT

## 2021-04-22 PROCEDURE — 63600175 PHARM REV CODE 636 W HCPCS: Performed by: EMERGENCY MEDICINE

## 2021-04-22 PROCEDURE — 99497 ADVNCD CARE PLAN 30 MIN: CPT | Mod: 25,,, | Performed by: INTERNAL MEDICINE

## 2021-04-22 PROCEDURE — 85025 COMPLETE CBC W/AUTO DIFF WBC: CPT | Performed by: INTERNAL MEDICINE

## 2021-04-22 RX ORDER — MORPHINE SULFATE 4 MG/ML
2 INJECTION, SOLUTION INTRAMUSCULAR; INTRAVENOUS ONCE
Status: COMPLETED | OUTPATIENT
Start: 2021-04-22 | End: 2021-04-22

## 2021-04-22 RX ORDER — FENTANYL CITRATE 50 UG/ML
50 INJECTION, SOLUTION INTRAMUSCULAR; INTRAVENOUS
Status: COMPLETED | OUTPATIENT
Start: 2021-04-22 | End: 2021-04-22

## 2021-04-22 RX ORDER — SODIUM CHLORIDE 0.9 % (FLUSH) 0.9 %
10 SYRINGE (ML) INJECTION EVERY 6 HOURS
Status: DISCONTINUED | OUTPATIENT
Start: 2021-04-22 | End: 2021-05-06 | Stop reason: HOSPADM

## 2021-04-22 RX ORDER — FENTANYL CITRATE 50 UG/ML
50 INJECTION, SOLUTION INTRAMUSCULAR; INTRAVENOUS
Status: DISCONTINUED | OUTPATIENT
Start: 2021-04-25 | End: 2021-04-30

## 2021-04-22 RX ORDER — SODIUM CHLORIDE 0.9 % (FLUSH) 0.9 %
10 SYRINGE (ML) INJECTION
Status: DISCONTINUED | OUTPATIENT
Start: 2021-04-22 | End: 2021-05-06 | Stop reason: HOSPADM

## 2021-04-22 RX ORDER — NOREPINEPHRINE BITARTRATE/D5W 4MG/250ML
0-3 PLASTIC BAG, INJECTION (ML) INTRAVENOUS CONTINUOUS
Status: DISCONTINUED | OUTPATIENT
Start: 2021-04-22 | End: 2021-04-29

## 2021-04-22 RX ADMIN — MUPIROCIN: 20 OINTMENT TOPICAL at 09:04

## 2021-04-22 RX ADMIN — IPRATROPIUM BROMIDE AND ALBUTEROL SULFATE 3 ML: .5; 3 SOLUTION RESPIRATORY (INHALATION) at 08:04

## 2021-04-22 RX ADMIN — FENTANYL CITRATE 50 MCG: 50 INJECTION, SOLUTION INTRAMUSCULAR; INTRAVENOUS at 09:04

## 2021-04-22 RX ADMIN — MORPHINE SULFATE 2 MG: 4 INJECTION, SOLUTION INTRAMUSCULAR; INTRAVENOUS at 04:04

## 2021-04-22 RX ADMIN — FENTANYL CITRATE 50 MCG: 50 INJECTION, SOLUTION INTRAMUSCULAR; INTRAVENOUS at 05:04

## 2021-04-22 RX ADMIN — PIPERACILLIN AND TAZOBACTAM 4.5 G: 4; .5 INJECTION, POWDER, LYOPHILIZED, FOR SOLUTION INTRAVENOUS; PARENTERAL at 01:04

## 2021-04-22 RX ADMIN — VANCOMYCIN HYDROCHLORIDE 1000 MG: 1 INJECTION, POWDER, LYOPHILIZED, FOR SOLUTION INTRAVENOUS at 10:04

## 2021-04-22 RX ADMIN — VANCOMYCIN HYDROCHLORIDE 1000 MG: 1 INJECTION, POWDER, LYOPHILIZED, FOR SOLUTION INTRAVENOUS at 11:04

## 2021-04-22 RX ADMIN — IPRATROPIUM BROMIDE AND ALBUTEROL SULFATE 3 ML: .5; 3 SOLUTION RESPIRATORY (INHALATION) at 03:04

## 2021-04-22 RX ADMIN — FENTANYL CITRATE 50 MCG: 50 INJECTION, SOLUTION INTRAMUSCULAR; INTRAVENOUS at 08:04

## 2021-04-22 RX ADMIN — DEXTROSE AND SODIUM CHLORIDE: 5; .9 INJECTION, SOLUTION INTRAVENOUS at 01:04

## 2021-04-22 RX ADMIN — ENOXAPARIN SODIUM 40 MG: 40 INJECTION SUBCUTANEOUS at 05:04

## 2021-04-22 RX ADMIN — ACETYLCYSTEINE 4 ML: 100 INHALANT RESPIRATORY (INHALATION) at 08:04

## 2021-04-22 RX ADMIN — IPRATROPIUM BROMIDE AND ALBUTEROL SULFATE 3 ML: .5; 3 SOLUTION RESPIRATORY (INHALATION) at 11:04

## 2021-04-22 RX ADMIN — Medication 0.02 MCG/KG/MIN: at 02:04

## 2021-04-22 RX ADMIN — Medication 10 ML: at 05:04

## 2021-04-22 RX ADMIN — ACETYLCYSTEINE 4 ML: 100 INHALANT RESPIRATORY (INHALATION) at 03:04

## 2021-04-22 RX ADMIN — ACETYLCYSTEINE 4 ML: 100 INHALANT RESPIRATORY (INHALATION) at 11:04

## 2021-04-22 RX ADMIN — PIPERACILLIN AND TAZOBACTAM 4.5 G: 4; .5 INJECTION, POWDER, LYOPHILIZED, FOR SOLUTION INTRAVENOUS; PARENTERAL at 05:04

## 2021-04-22 RX ADMIN — PIPERACILLIN AND TAZOBACTAM 4.5 G: 4; .5 INJECTION, POWDER, LYOPHILIZED, FOR SOLUTION INTRAVENOUS; PARENTERAL at 09:04

## 2021-04-22 RX ADMIN — Medication 10 ML: at 11:04

## 2021-04-23 LAB
ALLENS TEST: ABNORMAL
ANION GAP SERPL CALC-SCNC: 8 MMOL/L (ref 8–16)
BACTERIA UR CULT: ABNORMAL
BASOPHILS # BLD AUTO: 0.01 K/UL (ref 0–0.2)
BASOPHILS NFR BLD: 0.1 % (ref 0–1.9)
BUN SERPL-MCNC: 16 MG/DL (ref 6–20)
CALCIUM SERPL-MCNC: 8.9 MG/DL (ref 8.7–10.5)
CHLORIDE SERPL-SCNC: 107 MMOL/L (ref 95–110)
CO2 SERPL-SCNC: 25 MMOL/L (ref 23–29)
CREAT SERPL-MCNC: 0.6 MG/DL (ref 0.5–1.4)
DELSYS: ABNORMAL
DIFFERENTIAL METHOD: ABNORMAL
EOSINOPHIL # BLD AUTO: 0.1 K/UL (ref 0–0.5)
EOSINOPHIL NFR BLD: 0.8 % (ref 0–8)
ERYTHROCYTE [DISTWIDTH] IN BLOOD BY AUTOMATED COUNT: 17.5 % (ref 11.5–14.5)
EST. GFR  (AFRICAN AMERICAN): >60 ML/MIN/1.73 M^2
EST. GFR  (NON AFRICAN AMERICAN): >60 ML/MIN/1.73 M^2
FIO2: 40
GLUCOSE SERPL-MCNC: 98 MG/DL (ref 70–110)
HCO3 UR-SCNC: 25.5 MMOL/L (ref 24–28)
HCT VFR BLD AUTO: 27.1 % (ref 37–48.5)
HGB BLD-MCNC: 8.7 G/DL (ref 12–16)
IMM GRANULOCYTES # BLD AUTO: 0.02 K/UL (ref 0–0.04)
IMM GRANULOCYTES NFR BLD AUTO: 0.3 % (ref 0–0.5)
LYMPHOCYTES # BLD AUTO: 0.9 K/UL (ref 1–4.8)
LYMPHOCYTES NFR BLD: 10.8 % (ref 18–48)
MAP: 7
MCH RBC QN AUTO: 28.8 PG (ref 27–31)
MCHC RBC AUTO-ENTMCNC: 32.1 G/DL (ref 32–36)
MCV RBC AUTO: 90 FL (ref 82–98)
MIN VOL: 8.8
MODE: ABNORMAL
MONOCYTES # BLD AUTO: 0.6 K/UL (ref 0.3–1)
MONOCYTES NFR BLD: 7.5 % (ref 4–15)
NEUTROPHILS # BLD AUTO: 6.5 K/UL (ref 1.8–7.7)
NEUTROPHILS NFR BLD: 80.5 % (ref 38–73)
NRBC BLD-RTO: 0 /100 WBC
PCO2 BLDA: 39.6 MMHG (ref 35–45)
PEEP: 5
PH SMN: 7.42 [PH] (ref 7.35–7.45)
PLATELET # BLD AUTO: 150 K/UL (ref 150–450)
PMV BLD AUTO: 11.8 FL (ref 9.2–12.9)
PO2 BLDA: 101 MMHG (ref 80–100)
POC BE: 1 MMOL/L
POC SATURATED O2: 98 % (ref 95–100)
POC TCO2: 27 MMOL/L (ref 23–27)
POCT GLUCOSE: 175 MG/DL (ref 70–110)
POTASSIUM SERPL-SCNC: 3.8 MMOL/L (ref 3.5–5.1)
PS: 5
RBC # BLD AUTO: 3.02 M/UL (ref 4–5.4)
SAMPLE: ABNORMAL
SITE: ABNORMAL
SODIUM SERPL-SCNC: 140 MMOL/L (ref 136–145)
SP02: 99
SPONT RATE: 32
WBC # BLD AUTO: 8 K/UL (ref 3.9–12.7)

## 2021-04-23 PROCEDURE — 94761 N-INVAS EAR/PLS OXIMETRY MLT: CPT

## 2021-04-23 PROCEDURE — 94640 AIRWAY INHALATION TREATMENT: CPT

## 2021-04-23 PROCEDURE — 63600175 PHARM REV CODE 636 W HCPCS: Performed by: INTERNAL MEDICINE

## 2021-04-23 PROCEDURE — 87205 SMEAR GRAM STAIN: CPT | Performed by: STUDENT IN AN ORGANIZED HEALTH CARE EDUCATION/TRAINING PROGRAM

## 2021-04-23 PROCEDURE — A4216 STERILE WATER/SALINE, 10 ML: HCPCS | Performed by: STUDENT IN AN ORGANIZED HEALTH CARE EDUCATION/TRAINING PROGRAM

## 2021-04-23 PROCEDURE — 99291 CRITICAL CARE FIRST HOUR: CPT | Mod: ,,, | Performed by: INTERNAL MEDICINE

## 2021-04-23 PROCEDURE — 25000242 PHARM REV CODE 250 ALT 637 W/ HCPCS: Performed by: INTERNAL MEDICINE

## 2021-04-23 PROCEDURE — 87186 SC STD MICRODIL/AGAR DIL: CPT | Mod: 59 | Performed by: STUDENT IN AN ORGANIZED HEALTH CARE EDUCATION/TRAINING PROGRAM

## 2021-04-23 PROCEDURE — 99900035 HC TECH TIME PER 15 MIN (STAT)

## 2021-04-23 PROCEDURE — 25000003 PHARM REV CODE 250: Performed by: STUDENT IN AN ORGANIZED HEALTH CARE EDUCATION/TRAINING PROGRAM

## 2021-04-23 PROCEDURE — 80048 BASIC METABOLIC PNL TOTAL CA: CPT | Performed by: INTERNAL MEDICINE

## 2021-04-23 PROCEDURE — 94003 VENT MGMT INPAT SUBQ DAY: CPT

## 2021-04-23 PROCEDURE — 82803 BLOOD GASES ANY COMBINATION: CPT

## 2021-04-23 PROCEDURE — 85025 COMPLETE CBC W/AUTO DIFF WBC: CPT | Performed by: INTERNAL MEDICINE

## 2021-04-23 PROCEDURE — 36600 WITHDRAWAL OF ARTERIAL BLOOD: CPT

## 2021-04-23 PROCEDURE — 20000000 HC ICU ROOM

## 2021-04-23 PROCEDURE — 63600175 PHARM REV CODE 636 W HCPCS: Performed by: EMERGENCY MEDICINE

## 2021-04-23 PROCEDURE — 99900026 HC AIRWAY MAINTENANCE (STAT)

## 2021-04-23 PROCEDURE — 25000003 PHARM REV CODE 250: Performed by: EMERGENCY MEDICINE

## 2021-04-23 PROCEDURE — 25000003 PHARM REV CODE 250: Performed by: INTERNAL MEDICINE

## 2021-04-23 PROCEDURE — 99291 PR CRITICAL CARE, E/M 30-74 MINUTES: ICD-10-PCS | Mod: ,,, | Performed by: INTERNAL MEDICINE

## 2021-04-23 PROCEDURE — 27000221 HC OXYGEN, UP TO 24 HOURS

## 2021-04-23 PROCEDURE — 87070 CULTURE OTHR SPECIMN AEROBIC: CPT | Performed by: STUDENT IN AN ORGANIZED HEALTH CARE EDUCATION/TRAINING PROGRAM

## 2021-04-23 PROCEDURE — 87077 CULTURE AEROBIC IDENTIFY: CPT | Performed by: STUDENT IN AN ORGANIZED HEALTH CARE EDUCATION/TRAINING PROGRAM

## 2021-04-23 RX ORDER — IPRATROPIUM BROMIDE AND ALBUTEROL SULFATE 2.5; .5 MG/3ML; MG/3ML
3 SOLUTION RESPIRATORY (INHALATION) EVERY 6 HOURS
Status: DISCONTINUED | OUTPATIENT
Start: 2021-04-23 | End: 2021-05-01

## 2021-04-23 RX ADMIN — Medication 0.05 MCG/KG/MIN: at 05:04

## 2021-04-23 RX ADMIN — DEXTROSE AND SODIUM CHLORIDE: 5; .9 INJECTION, SOLUTION INTRAVENOUS at 04:04

## 2021-04-23 RX ADMIN — PIPERACILLIN AND TAZOBACTAM 4.5 G: 4; .5 INJECTION, POWDER, LYOPHILIZED, FOR SOLUTION INTRAVENOUS; PARENTERAL at 05:04

## 2021-04-23 RX ADMIN — ENOXAPARIN SODIUM 40 MG: 40 INJECTION SUBCUTANEOUS at 05:04

## 2021-04-23 RX ADMIN — Medication 0.05 MCG/KG/MIN: at 12:04

## 2021-04-23 RX ADMIN — MAGNESIUM OXIDE 400 MG (241.3 MG MAGNESIUM) TABLET 400 MG: TABLET at 09:04

## 2021-04-23 RX ADMIN — MUPIROCIN: 20 OINTMENT TOPICAL at 09:04

## 2021-04-23 RX ADMIN — PIPERACILLIN AND TAZOBACTAM 4.5 G: 4; .5 INJECTION, POWDER, LYOPHILIZED, FOR SOLUTION INTRAVENOUS; PARENTERAL at 01:04

## 2021-04-23 RX ADMIN — Medication 10 ML: at 05:04

## 2021-04-23 RX ADMIN — ACETYLCYSTEINE 4 ML: 100 INHALANT RESPIRATORY (INHALATION) at 07:04

## 2021-04-23 RX ADMIN — IPRATROPIUM BROMIDE AND ALBUTEROL SULFATE 3 ML: .5; 3 SOLUTION RESPIRATORY (INHALATION) at 07:04

## 2021-04-23 RX ADMIN — PIPERACILLIN AND TAZOBACTAM 4.5 G: 4; .5 INJECTION, POWDER, LYOPHILIZED, FOR SOLUTION INTRAVENOUS; PARENTERAL at 09:04

## 2021-04-23 RX ADMIN — VANCOMYCIN HYDROCHLORIDE 1000 MG: 1 INJECTION, POWDER, LYOPHILIZED, FOR SOLUTION INTRAVENOUS at 10:04

## 2021-04-23 RX ADMIN — VANCOMYCIN HYDROCHLORIDE 1000 MG: 1 INJECTION, POWDER, LYOPHILIZED, FOR SOLUTION INTRAVENOUS at 09:04

## 2021-04-24 LAB
ANION GAP SERPL CALC-SCNC: 8 MMOL/L (ref 8–16)
BUN SERPL-MCNC: 8 MG/DL (ref 6–20)
CALCIUM SERPL-MCNC: 9.4 MG/DL (ref 8.7–10.5)
CHLORIDE SERPL-SCNC: 107 MMOL/L (ref 95–110)
CO2 SERPL-SCNC: 25 MMOL/L (ref 23–29)
CREAT SERPL-MCNC: 0.6 MG/DL (ref 0.5–1.4)
EST. GFR  (AFRICAN AMERICAN): >60 ML/MIN/1.73 M^2
EST. GFR  (NON AFRICAN AMERICAN): >60 ML/MIN/1.73 M^2
GLUCOSE SERPL-MCNC: 79 MG/DL (ref 70–110)
POTASSIUM SERPL-SCNC: 3.5 MMOL/L (ref 3.5–5.1)
SODIUM SERPL-SCNC: 140 MMOL/L (ref 136–145)

## 2021-04-24 PROCEDURE — 25000242 PHARM REV CODE 250 ALT 637 W/ HCPCS: Performed by: INTERNAL MEDICINE

## 2021-04-24 PROCEDURE — 25000003 PHARM REV CODE 250: Performed by: EMERGENCY MEDICINE

## 2021-04-24 PROCEDURE — 99233 SBSQ HOSP IP/OBS HIGH 50: CPT | Mod: ,,, | Performed by: INTERNAL MEDICINE

## 2021-04-24 PROCEDURE — 99900026 HC AIRWAY MAINTENANCE (STAT)

## 2021-04-24 PROCEDURE — 94761 N-INVAS EAR/PLS OXIMETRY MLT: CPT

## 2021-04-24 PROCEDURE — 99233 PR SUBSEQUENT HOSPITAL CARE,LEVL III: ICD-10-PCS | Mod: ,,, | Performed by: INTERNAL MEDICINE

## 2021-04-24 PROCEDURE — 27000221 HC OXYGEN, UP TO 24 HOURS

## 2021-04-24 PROCEDURE — 20000000 HC ICU ROOM

## 2021-04-24 PROCEDURE — 25000003 PHARM REV CODE 250: Performed by: STUDENT IN AN ORGANIZED HEALTH CARE EDUCATION/TRAINING PROGRAM

## 2021-04-24 PROCEDURE — 25000003 PHARM REV CODE 250: Performed by: INTERNAL MEDICINE

## 2021-04-24 PROCEDURE — 63600175 PHARM REV CODE 636 W HCPCS: Performed by: INTERNAL MEDICINE

## 2021-04-24 PROCEDURE — 94640 AIRWAY INHALATION TREATMENT: CPT

## 2021-04-24 PROCEDURE — 36415 COLL VENOUS BLD VENIPUNCTURE: CPT | Performed by: STUDENT IN AN ORGANIZED HEALTH CARE EDUCATION/TRAINING PROGRAM

## 2021-04-24 PROCEDURE — 87040 BLOOD CULTURE FOR BACTERIA: CPT | Mod: 59 | Performed by: STUDENT IN AN ORGANIZED HEALTH CARE EDUCATION/TRAINING PROGRAM

## 2021-04-24 PROCEDURE — 80048 BASIC METABOLIC PNL TOTAL CA: CPT | Performed by: INTERNAL MEDICINE

## 2021-04-24 PROCEDURE — A4216 STERILE WATER/SALINE, 10 ML: HCPCS | Performed by: STUDENT IN AN ORGANIZED HEALTH CARE EDUCATION/TRAINING PROGRAM

## 2021-04-24 PROCEDURE — 99900035 HC TECH TIME PER 15 MIN (STAT)

## 2021-04-24 RX ORDER — MIDODRINE HYDROCHLORIDE 5 MG/1
5 TABLET ORAL 3 TIMES DAILY
Status: DISCONTINUED | OUTPATIENT
Start: 2021-04-24 | End: 2021-04-25

## 2021-04-24 RX ADMIN — ENOXAPARIN SODIUM 40 MG: 40 INJECTION SUBCUTANEOUS at 04:04

## 2021-04-24 RX ADMIN — MUPIROCIN: 20 OINTMENT TOPICAL at 09:04

## 2021-04-24 RX ADMIN — PIPERACILLIN AND TAZOBACTAM 4.5 G: 4; .5 INJECTION, POWDER, LYOPHILIZED, FOR SOLUTION INTRAVENOUS; PARENTERAL at 09:04

## 2021-04-24 RX ADMIN — Medication 10 ML: at 12:04

## 2021-04-24 RX ADMIN — PIPERACILLIN AND TAZOBACTAM 4.5 G: 4; .5 INJECTION, POWDER, LYOPHILIZED, FOR SOLUTION INTRAVENOUS; PARENTERAL at 12:04

## 2021-04-24 RX ADMIN — MAGNESIUM OXIDE 400 MG (241.3 MG MAGNESIUM) TABLET 400 MG: TABLET at 09:04

## 2021-04-24 RX ADMIN — IPRATROPIUM BROMIDE AND ALBUTEROL SULFATE 3 ML: .5; 3 SOLUTION RESPIRATORY (INHALATION) at 07:04

## 2021-04-24 RX ADMIN — IPRATROPIUM BROMIDE AND ALBUTEROL SULFATE 3 ML: .5; 3 SOLUTION RESPIRATORY (INHALATION) at 08:04

## 2021-04-24 RX ADMIN — PIPERACILLIN AND TAZOBACTAM 4.5 G: 4; .5 INJECTION, POWDER, LYOPHILIZED, FOR SOLUTION INTRAVENOUS; PARENTERAL at 05:04

## 2021-04-24 RX ADMIN — IPRATROPIUM BROMIDE AND ALBUTEROL SULFATE 3 ML: .5; 3 SOLUTION RESPIRATORY (INHALATION) at 01:04

## 2021-04-24 RX ADMIN — MIDODRINE HYDROCHLORIDE 5 MG: 5 TABLET ORAL at 09:04

## 2021-04-24 RX ADMIN — SCOPOLAMINE 1 PATCH: 1.5 PATCH, EXTENDED RELEASE TRANSDERMAL at 12:04

## 2021-04-24 RX ADMIN — IPRATROPIUM BROMIDE AND ALBUTEROL SULFATE 3 ML: .5; 3 SOLUTION RESPIRATORY (INHALATION) at 12:04

## 2021-04-24 RX ADMIN — MIDODRINE HYDROCHLORIDE 5 MG: 5 TABLET ORAL at 02:04

## 2021-04-24 RX ADMIN — Medication 10 ML: at 05:04

## 2021-04-25 LAB
ALBUMIN SERPL BCP-MCNC: 2.2 G/DL (ref 3.5–5.2)
ANION GAP SERPL CALC-SCNC: 8 MMOL/L (ref 8–16)
ANION GAP SERPL CALC-SCNC: 9 MMOL/L (ref 8–16)
BACTERIA BLD CULT: ABNORMAL
BACTERIA SPEC AEROBE CULT: ABNORMAL
BASOPHILS # BLD AUTO: 0 K/UL (ref 0–0.2)
BASOPHILS NFR BLD: 0 % (ref 0–1.9)
BUN SERPL-MCNC: 8 MG/DL (ref 6–20)
BUN SERPL-MCNC: 9 MG/DL (ref 6–20)
CALCIUM SERPL-MCNC: 9.1 MG/DL (ref 8.7–10.5)
CALCIUM SERPL-MCNC: 9.2 MG/DL (ref 8.7–10.5)
CHLORIDE SERPL-SCNC: 107 MMOL/L (ref 95–110)
CHLORIDE SERPL-SCNC: 107 MMOL/L (ref 95–110)
CO2 SERPL-SCNC: 25 MMOL/L (ref 23–29)
CO2 SERPL-SCNC: 26 MMOL/L (ref 23–29)
CREAT SERPL-MCNC: 0.7 MG/DL (ref 0.5–1.4)
CREAT SERPL-MCNC: 0.7 MG/DL (ref 0.5–1.4)
DIFFERENTIAL METHOD: ABNORMAL
EOSINOPHIL # BLD AUTO: 0.1 K/UL (ref 0–0.5)
EOSINOPHIL NFR BLD: 3.5 % (ref 0–8)
ERYTHROCYTE [DISTWIDTH] IN BLOOD BY AUTOMATED COUNT: 17.2 % (ref 11.5–14.5)
EST. GFR  (AFRICAN AMERICAN): >60 ML/MIN/1.73 M^2
EST. GFR  (AFRICAN AMERICAN): >60 ML/MIN/1.73 M^2
EST. GFR  (NON AFRICAN AMERICAN): >60 ML/MIN/1.73 M^2
EST. GFR  (NON AFRICAN AMERICAN): >60 ML/MIN/1.73 M^2
GLUCOSE SERPL-MCNC: 86 MG/DL (ref 70–110)
GLUCOSE SERPL-MCNC: 88 MG/DL (ref 70–110)
GRAM STN SPEC: ABNORMAL
HCT VFR BLD AUTO: 27.6 % (ref 37–48.5)
HGB BLD-MCNC: 8.8 G/DL (ref 12–16)
IMM GRANULOCYTES # BLD AUTO: 0.01 K/UL (ref 0–0.04)
IMM GRANULOCYTES NFR BLD AUTO: 0.2 % (ref 0–0.5)
LYMPHOCYTES # BLD AUTO: 1.2 K/UL (ref 1–4.8)
LYMPHOCYTES NFR BLD: 29.1 % (ref 18–48)
MAGNESIUM SERPL-MCNC: 1.7 MG/DL (ref 1.6–2.6)
MCH RBC QN AUTO: 28.6 PG (ref 27–31)
MCHC RBC AUTO-ENTMCNC: 31.9 G/DL (ref 32–36)
MCV RBC AUTO: 90 FL (ref 82–98)
MONOCYTES # BLD AUTO: 0.4 K/UL (ref 0.3–1)
MONOCYTES NFR BLD: 10.1 % (ref 4–15)
NEUTROPHILS # BLD AUTO: 2.3 K/UL (ref 1.8–7.7)
NEUTROPHILS NFR BLD: 57.1 % (ref 38–73)
NRBC BLD-RTO: 1 /100 WBC
PHOSPHATE SERPL-MCNC: 3 MG/DL (ref 2.7–4.5)
PLATELET # BLD AUTO: 162 K/UL (ref 150–450)
PMV BLD AUTO: 11.2 FL (ref 9.2–12.9)
POTASSIUM SERPL-SCNC: 3.2 MMOL/L (ref 3.5–5.1)
POTASSIUM SERPL-SCNC: 3.2 MMOL/L (ref 3.5–5.1)
RBC # BLD AUTO: 3.08 M/UL (ref 4–5.4)
SODIUM SERPL-SCNC: 141 MMOL/L (ref 136–145)
SODIUM SERPL-SCNC: 141 MMOL/L (ref 136–145)
WBC # BLD AUTO: 4.05 K/UL (ref 3.9–12.7)

## 2021-04-25 PROCEDURE — 85025 COMPLETE CBC W/AUTO DIFF WBC: CPT | Performed by: STUDENT IN AN ORGANIZED HEALTH CARE EDUCATION/TRAINING PROGRAM

## 2021-04-25 PROCEDURE — 63600175 PHARM REV CODE 636 W HCPCS: Performed by: SURGERY

## 2021-04-25 PROCEDURE — 94640 AIRWAY INHALATION TREATMENT: CPT

## 2021-04-25 PROCEDURE — 83735 ASSAY OF MAGNESIUM: CPT | Performed by: SURGERY

## 2021-04-25 PROCEDURE — 63600175 PHARM REV CODE 636 W HCPCS: Performed by: STUDENT IN AN ORGANIZED HEALTH CARE EDUCATION/TRAINING PROGRAM

## 2021-04-25 PROCEDURE — A4216 STERILE WATER/SALINE, 10 ML: HCPCS | Performed by: STUDENT IN AN ORGANIZED HEALTH CARE EDUCATION/TRAINING PROGRAM

## 2021-04-25 PROCEDURE — 27000221 HC OXYGEN, UP TO 24 HOURS

## 2021-04-25 PROCEDURE — 80069 RENAL FUNCTION PANEL: CPT | Performed by: SURGERY

## 2021-04-25 PROCEDURE — 25000003 PHARM REV CODE 250: Performed by: STUDENT IN AN ORGANIZED HEALTH CARE EDUCATION/TRAINING PROGRAM

## 2021-04-25 PROCEDURE — 99900035 HC TECH TIME PER 15 MIN (STAT)

## 2021-04-25 PROCEDURE — 25000242 PHARM REV CODE 250 ALT 637 W/ HCPCS: Performed by: INTERNAL MEDICINE

## 2021-04-25 PROCEDURE — 20000000 HC ICU ROOM

## 2021-04-25 PROCEDURE — 63600175 PHARM REV CODE 636 W HCPCS: Performed by: INTERNAL MEDICINE

## 2021-04-25 PROCEDURE — 99900026 HC AIRWAY MAINTENANCE (STAT)

## 2021-04-25 PROCEDURE — 25000003 PHARM REV CODE 250: Performed by: INTERNAL MEDICINE

## 2021-04-25 PROCEDURE — 80048 BASIC METABOLIC PNL TOTAL CA: CPT | Performed by: INTERNAL MEDICINE

## 2021-04-25 RX ORDER — POTASSIUM CHLORIDE 29.8 MG/ML
80 INJECTION INTRAVENOUS
Status: DISCONTINUED | OUTPATIENT
Start: 2021-04-25 | End: 2021-05-01

## 2021-04-25 RX ORDER — POTASSIUM CHLORIDE 29.8 MG/ML
40 INJECTION INTRAVENOUS
Status: DISCONTINUED | OUTPATIENT
Start: 2021-04-25 | End: 2021-05-01

## 2021-04-25 RX ORDER — MIDODRINE HYDROCHLORIDE 5 MG/1
10 TABLET ORAL 3 TIMES DAILY
Status: DISCONTINUED | OUTPATIENT
Start: 2021-04-25 | End: 2021-05-06 | Stop reason: HOSPADM

## 2021-04-25 RX ORDER — ATROPINE SULFATE 0.1 MG/ML
INJECTION INTRAVENOUS
Status: DISPENSED
Start: 2021-04-25 | End: 2021-04-25

## 2021-04-25 RX ORDER — MAGNESIUM SULFATE HEPTAHYDRATE 40 MG/ML
2 INJECTION, SOLUTION INTRAVENOUS
Status: DISCONTINUED | OUTPATIENT
Start: 2021-04-25 | End: 2021-05-01

## 2021-04-25 RX ORDER — POTASSIUM CHLORIDE 14.9 MG/ML
60 INJECTION INTRAVENOUS
Status: DISCONTINUED | OUTPATIENT
Start: 2021-04-25 | End: 2021-05-01

## 2021-04-25 RX ORDER — DOBUTAMINE HYDROCHLORIDE 400 MG/100ML
0-20 INJECTION INTRAVENOUS CONTINUOUS
Status: DISCONTINUED | OUTPATIENT
Start: 2021-04-25 | End: 2021-04-25

## 2021-04-25 RX ORDER — MEROPENEM AND SODIUM CHLORIDE 1 G/50ML
1 INJECTION, SOLUTION INTRAVENOUS
Status: DISCONTINUED | OUTPATIENT
Start: 2021-04-25 | End: 2021-05-06 | Stop reason: HOSPADM

## 2021-04-25 RX ORDER — MAGNESIUM SULFATE HEPTAHYDRATE 40 MG/ML
4 INJECTION, SOLUTION INTRAVENOUS
Status: DISCONTINUED | OUTPATIENT
Start: 2021-04-25 | End: 2021-05-01

## 2021-04-25 RX ADMIN — PIPERACILLIN AND TAZOBACTAM 4.5 G: 4; .5 INJECTION, POWDER, LYOPHILIZED, FOR SOLUTION INTRAVENOUS; PARENTERAL at 09:04

## 2021-04-25 RX ADMIN — IPRATROPIUM BROMIDE AND ALBUTEROL SULFATE 3 ML: .5; 3 SOLUTION RESPIRATORY (INHALATION) at 01:04

## 2021-04-25 RX ADMIN — Medication 10 ML: at 11:04

## 2021-04-25 RX ADMIN — Medication 10 ML: at 01:04

## 2021-04-25 RX ADMIN — IPRATROPIUM BROMIDE AND ALBUTEROL SULFATE 3 ML: .5; 3 SOLUTION RESPIRATORY (INHALATION) at 08:04

## 2021-04-25 RX ADMIN — ENOXAPARIN SODIUM 40 MG: 40 INJECTION SUBCUTANEOUS at 04:04

## 2021-04-25 RX ADMIN — IPRATROPIUM BROMIDE AND ALBUTEROL SULFATE 3 ML: .5; 3 SOLUTION RESPIRATORY (INHALATION) at 07:04

## 2021-04-25 RX ADMIN — MIDODRINE HYDROCHLORIDE 10 MG: 5 TABLET ORAL at 02:04

## 2021-04-25 RX ADMIN — DOBUTAMINE HYDROCHLORIDE 2.5 MCG/KG/MIN: 400 INJECTION INTRAVENOUS at 04:04

## 2021-04-25 RX ADMIN — MIDODRINE HYDROCHLORIDE 10 MG: 5 TABLET ORAL at 09:04

## 2021-04-25 RX ADMIN — MAGNESIUM OXIDE 400 MG (241.3 MG MAGNESIUM) TABLET 400 MG: TABLET at 09:04

## 2021-04-25 RX ADMIN — MUPIROCIN: 20 OINTMENT TOPICAL at 08:04

## 2021-04-25 RX ADMIN — MUPIROCIN: 20 OINTMENT TOPICAL at 09:04

## 2021-04-25 RX ADMIN — MAGNESIUM SULFATE HEPTAHYDRATE 2 G: 40 INJECTION, SOLUTION INTRAVENOUS at 05:04

## 2021-04-25 RX ADMIN — PIPERACILLIN AND TAZOBACTAM 4.5 G: 4; .5 INJECTION, POWDER, LYOPHILIZED, FOR SOLUTION INTRAVENOUS; PARENTERAL at 01:04

## 2021-04-25 RX ADMIN — MEROPENEM AND SODIUM CHLORIDE 1 G: 1 INJECTION, SOLUTION INTRAVENOUS at 04:04

## 2021-04-25 RX ADMIN — POTASSIUM CHLORIDE 60 MEQ: 14.9 INJECTION, SOLUTION INTRAVENOUS at 05:04

## 2021-04-25 RX ADMIN — Medication 10 ML: at 07:04

## 2021-04-25 RX ADMIN — Medication 10 ML: at 05:04

## 2021-04-25 RX ADMIN — MIDODRINE HYDROCHLORIDE 10 MG: 5 TABLET ORAL at 08:04

## 2021-04-26 LAB
ANION GAP SERPL CALC-SCNC: 7 MMOL/L (ref 8–16)
BACTERIA BLD CULT: NORMAL
BASOPHILS # BLD AUTO: 0.01 K/UL (ref 0–0.2)
BASOPHILS NFR BLD: 0.2 % (ref 0–1.9)
BUN SERPL-MCNC: 11 MG/DL (ref 6–20)
CALCIUM SERPL-MCNC: 8.9 MG/DL (ref 8.7–10.5)
CHLORIDE SERPL-SCNC: 108 MMOL/L (ref 95–110)
CO2 SERPL-SCNC: 26 MMOL/L (ref 23–29)
CREAT SERPL-MCNC: 0.6 MG/DL (ref 0.5–1.4)
DIFFERENTIAL METHOD: ABNORMAL
EOSINOPHIL # BLD AUTO: 0.1 K/UL (ref 0–0.5)
EOSINOPHIL NFR BLD: 2.5 % (ref 0–8)
ERYTHROCYTE [DISTWIDTH] IN BLOOD BY AUTOMATED COUNT: 17.2 % (ref 11.5–14.5)
EST. GFR  (AFRICAN AMERICAN): >60 ML/MIN/1.73 M^2
EST. GFR  (NON AFRICAN AMERICAN): >60 ML/MIN/1.73 M^2
GLUCOSE SERPL-MCNC: 76 MG/DL (ref 70–110)
HCT VFR BLD AUTO: 26.6 % (ref 37–48.5)
HGB BLD-MCNC: 8.6 G/DL (ref 12–16)
IMM GRANULOCYTES # BLD AUTO: 0.01 K/UL (ref 0–0.04)
IMM GRANULOCYTES NFR BLD AUTO: 0.2 % (ref 0–0.5)
LYMPHOCYTES # BLD AUTO: 1.2 K/UL (ref 1–4.8)
LYMPHOCYTES NFR BLD: 27 % (ref 18–48)
MCH RBC QN AUTO: 29 PG (ref 27–31)
MCHC RBC AUTO-ENTMCNC: 32.3 G/DL (ref 32–36)
MCV RBC AUTO: 90 FL (ref 82–98)
MONOCYTES # BLD AUTO: 0.4 K/UL (ref 0.3–1)
MONOCYTES NFR BLD: 8.3 % (ref 4–15)
NEUTROPHILS # BLD AUTO: 2.7 K/UL (ref 1.8–7.7)
NEUTROPHILS NFR BLD: 61.8 % (ref 38–73)
NRBC BLD-RTO: 1 /100 WBC
PLATELET # BLD AUTO: 151 K/UL (ref 150–450)
PMV BLD AUTO: 11.1 FL (ref 9.2–12.9)
POTASSIUM SERPL-SCNC: 4.1 MMOL/L (ref 3.5–5.1)
RBC # BLD AUTO: 2.97 M/UL (ref 4–5.4)
SODIUM SERPL-SCNC: 141 MMOL/L (ref 136–145)
WBC # BLD AUTO: 4.34 K/UL (ref 3.9–12.7)

## 2021-04-26 PROCEDURE — 99900026 HC AIRWAY MAINTENANCE (STAT)

## 2021-04-26 PROCEDURE — A4216 STERILE WATER/SALINE, 10 ML: HCPCS | Performed by: STUDENT IN AN ORGANIZED HEALTH CARE EDUCATION/TRAINING PROGRAM

## 2021-04-26 PROCEDURE — 99233 PR SUBSEQUENT HOSPITAL CARE,LEVL III: ICD-10-PCS | Mod: ,,, | Performed by: INTERNAL MEDICINE

## 2021-04-26 PROCEDURE — 80048 BASIC METABOLIC PNL TOTAL CA: CPT | Performed by: INTERNAL MEDICINE

## 2021-04-26 PROCEDURE — S0028 INJECTION, FAMOTIDINE, 20 MG: HCPCS | Performed by: STUDENT IN AN ORGANIZED HEALTH CARE EDUCATION/TRAINING PROGRAM

## 2021-04-26 PROCEDURE — 63600175 PHARM REV CODE 636 W HCPCS: Performed by: INTERNAL MEDICINE

## 2021-04-26 PROCEDURE — 99223 PR INITIAL HOSPITAL CARE,LEVL III: ICD-10-PCS | Mod: ,,, | Performed by: INTERNAL MEDICINE

## 2021-04-26 PROCEDURE — 99900035 HC TECH TIME PER 15 MIN (STAT)

## 2021-04-26 PROCEDURE — 25000003 PHARM REV CODE 250: Performed by: INTERNAL MEDICINE

## 2021-04-26 PROCEDURE — 27000221 HC OXYGEN, UP TO 24 HOURS

## 2021-04-26 PROCEDURE — 94640 AIRWAY INHALATION TREATMENT: CPT

## 2021-04-26 PROCEDURE — 99223 1ST HOSP IP/OBS HIGH 75: CPT | Mod: ,,, | Performed by: INTERNAL MEDICINE

## 2021-04-26 PROCEDURE — 94761 N-INVAS EAR/PLS OXIMETRY MLT: CPT

## 2021-04-26 PROCEDURE — 25000003 PHARM REV CODE 250: Performed by: STUDENT IN AN ORGANIZED HEALTH CARE EDUCATION/TRAINING PROGRAM

## 2021-04-26 PROCEDURE — 63600175 PHARM REV CODE 636 W HCPCS: Performed by: STUDENT IN AN ORGANIZED HEALTH CARE EDUCATION/TRAINING PROGRAM

## 2021-04-26 PROCEDURE — 99233 SBSQ HOSP IP/OBS HIGH 50: CPT | Mod: ,,, | Performed by: INTERNAL MEDICINE

## 2021-04-26 PROCEDURE — 25000242 PHARM REV CODE 250 ALT 637 W/ HCPCS: Performed by: INTERNAL MEDICINE

## 2021-04-26 PROCEDURE — 85025 COMPLETE CBC W/AUTO DIFF WBC: CPT | Performed by: STUDENT IN AN ORGANIZED HEALTH CARE EDUCATION/TRAINING PROGRAM

## 2021-04-26 PROCEDURE — 20000000 HC ICU ROOM

## 2021-04-26 RX ORDER — FAMOTIDINE 20 MG/50ML
20 INJECTION, SOLUTION INTRAVENOUS 2 TIMES DAILY
Status: DISCONTINUED | OUTPATIENT
Start: 2021-04-26 | End: 2021-05-06 | Stop reason: HOSPADM

## 2021-04-26 RX ORDER — ATROPINE SULFATE 0.1 MG/ML
1 INJECTION INTRAVENOUS ONCE
Status: DISCONTINUED | OUTPATIENT
Start: 2021-04-26 | End: 2021-05-01

## 2021-04-26 RX ADMIN — MEROPENEM AND SODIUM CHLORIDE 1 G: 1 INJECTION, SOLUTION INTRAVENOUS at 12:04

## 2021-04-26 RX ADMIN — ENOXAPARIN SODIUM 40 MG: 40 INJECTION SUBCUTANEOUS at 06:04

## 2021-04-26 RX ADMIN — MUPIROCIN: 20 OINTMENT TOPICAL at 08:04

## 2021-04-26 RX ADMIN — MEROPENEM AND SODIUM CHLORIDE 1 G: 1 INJECTION, SOLUTION INTRAVENOUS at 06:04

## 2021-04-26 RX ADMIN — Medication 10 ML: at 12:04

## 2021-04-26 RX ADMIN — IPRATROPIUM BROMIDE AND ALBUTEROL SULFATE 3 ML: .5; 3 SOLUTION RESPIRATORY (INHALATION) at 01:04

## 2021-04-26 RX ADMIN — MIDODRINE HYDROCHLORIDE 10 MG: 5 TABLET ORAL at 09:04

## 2021-04-26 RX ADMIN — IPRATROPIUM BROMIDE AND ALBUTEROL SULFATE 3 ML: .5; 3 SOLUTION RESPIRATORY (INHALATION) at 12:04

## 2021-04-26 RX ADMIN — IPRATROPIUM BROMIDE AND ALBUTEROL SULFATE 3 ML: .5; 3 SOLUTION RESPIRATORY (INHALATION) at 08:04

## 2021-04-26 RX ADMIN — FAMOTIDINE 20 MG: 20 INJECTION, SOLUTION INTRAVENOUS at 01:04

## 2021-04-26 RX ADMIN — MAGNESIUM OXIDE 400 MG (241.3 MG MAGNESIUM) TABLET 400 MG: TABLET at 08:04

## 2021-04-26 RX ADMIN — FAMOTIDINE 20 MG: 20 INJECTION, SOLUTION INTRAVENOUS at 08:04

## 2021-04-26 RX ADMIN — MIDODRINE HYDROCHLORIDE 10 MG: 5 TABLET ORAL at 03:04

## 2021-04-26 RX ADMIN — Medication 0.02 MCG/KG/MIN: at 09:04

## 2021-04-26 RX ADMIN — MIDODRINE HYDROCHLORIDE 10 MG: 5 TABLET ORAL at 08:04

## 2021-04-26 RX ADMIN — MEROPENEM AND SODIUM CHLORIDE 1 G: 1 INJECTION, SOLUTION INTRAVENOUS at 08:04

## 2021-04-27 LAB
ANION GAP SERPL CALC-SCNC: 9 MMOL/L (ref 8–16)
BASOPHILS # BLD AUTO: 0.01 K/UL (ref 0–0.2)
BASOPHILS NFR BLD: 0.2 % (ref 0–1.9)
BUN SERPL-MCNC: 10 MG/DL (ref 6–20)
CALCIUM SERPL-MCNC: 9 MG/DL (ref 8.7–10.5)
CHLORIDE SERPL-SCNC: 106 MMOL/L (ref 95–110)
CO2 SERPL-SCNC: 28 MMOL/L (ref 23–29)
CREAT SERPL-MCNC: 0.6 MG/DL (ref 0.5–1.4)
DIFFERENTIAL METHOD: ABNORMAL
EOSINOPHIL # BLD AUTO: 0.1 K/UL (ref 0–0.5)
EOSINOPHIL NFR BLD: 2.4 % (ref 0–8)
ERYTHROCYTE [DISTWIDTH] IN BLOOD BY AUTOMATED COUNT: 17.6 % (ref 11.5–14.5)
EST. GFR  (AFRICAN AMERICAN): >60 ML/MIN/1.73 M^2
EST. GFR  (NON AFRICAN AMERICAN): >60 ML/MIN/1.73 M^2
GLUCOSE SERPL-MCNC: 112 MG/DL (ref 70–110)
HCT VFR BLD AUTO: 28.4 % (ref 37–48.5)
HGB BLD-MCNC: 9.1 G/DL (ref 12–16)
IMM GRANULOCYTES # BLD AUTO: 0.01 K/UL (ref 0–0.04)
IMM GRANULOCYTES NFR BLD AUTO: 0.2 % (ref 0–0.5)
LYMPHOCYTES # BLD AUTO: 1.4 K/UL (ref 1–4.8)
LYMPHOCYTES NFR BLD: 26.1 % (ref 18–48)
MCH RBC QN AUTO: 29.1 PG (ref 27–31)
MCHC RBC AUTO-ENTMCNC: 32 G/DL (ref 32–36)
MCV RBC AUTO: 91 FL (ref 82–98)
MONOCYTES # BLD AUTO: 0.4 K/UL (ref 0.3–1)
MONOCYTES NFR BLD: 7.5 % (ref 4–15)
NEUTROPHILS # BLD AUTO: 3.5 K/UL (ref 1.8–7.7)
NEUTROPHILS NFR BLD: 63.6 % (ref 38–73)
NRBC BLD-RTO: 0 /100 WBC
PLATELET # BLD AUTO: 186 K/UL (ref 150–450)
PMV BLD AUTO: 10.8 FL (ref 9.2–12.9)
POTASSIUM SERPL-SCNC: 3.7 MMOL/L (ref 3.5–5.1)
RBC # BLD AUTO: 3.13 M/UL (ref 4–5.4)
SODIUM SERPL-SCNC: 143 MMOL/L (ref 136–145)
WBC # BLD AUTO: 5.45 K/UL (ref 3.9–12.7)

## 2021-04-27 PROCEDURE — 27000221 HC OXYGEN, UP TO 24 HOURS

## 2021-04-27 PROCEDURE — 99497 PR ADVNCD CARE PLAN 30 MIN: ICD-10-PCS | Mod: ,,, | Performed by: INTERNAL MEDICINE

## 2021-04-27 PROCEDURE — 99497 ADVNCD CARE PLAN 30 MIN: CPT | Mod: ,,, | Performed by: INTERNAL MEDICINE

## 2021-04-27 PROCEDURE — A4216 STERILE WATER/SALINE, 10 ML: HCPCS | Performed by: STUDENT IN AN ORGANIZED HEALTH CARE EDUCATION/TRAINING PROGRAM

## 2021-04-27 PROCEDURE — 25000003 PHARM REV CODE 250: Performed by: STUDENT IN AN ORGANIZED HEALTH CARE EDUCATION/TRAINING PROGRAM

## 2021-04-27 PROCEDURE — S0028 INJECTION, FAMOTIDINE, 20 MG: HCPCS | Performed by: STUDENT IN AN ORGANIZED HEALTH CARE EDUCATION/TRAINING PROGRAM

## 2021-04-27 PROCEDURE — 63600175 PHARM REV CODE 636 W HCPCS: Performed by: STUDENT IN AN ORGANIZED HEALTH CARE EDUCATION/TRAINING PROGRAM

## 2021-04-27 PROCEDURE — 94640 AIRWAY INHALATION TREATMENT: CPT

## 2021-04-27 PROCEDURE — 80048 BASIC METABOLIC PNL TOTAL CA: CPT | Performed by: INTERNAL MEDICINE

## 2021-04-27 PROCEDURE — 99233 SBSQ HOSP IP/OBS HIGH 50: CPT | Mod: ,,, | Performed by: INTERNAL MEDICINE

## 2021-04-27 PROCEDURE — 99233 PR SUBSEQUENT HOSPITAL CARE,LEVL III: ICD-10-PCS | Mod: ,,, | Performed by: INTERNAL MEDICINE

## 2021-04-27 PROCEDURE — 20000000 HC ICU ROOM

## 2021-04-27 PROCEDURE — 99900026 HC AIRWAY MAINTENANCE (STAT)

## 2021-04-27 PROCEDURE — 94761 N-INVAS EAR/PLS OXIMETRY MLT: CPT

## 2021-04-27 PROCEDURE — 85025 COMPLETE CBC W/AUTO DIFF WBC: CPT | Performed by: STUDENT IN AN ORGANIZED HEALTH CARE EDUCATION/TRAINING PROGRAM

## 2021-04-27 PROCEDURE — 63600175 PHARM REV CODE 636 W HCPCS: Performed by: INTERNAL MEDICINE

## 2021-04-27 PROCEDURE — 25000003 PHARM REV CODE 250: Performed by: INTERNAL MEDICINE

## 2021-04-27 PROCEDURE — 25000242 PHARM REV CODE 250 ALT 637 W/ HCPCS: Performed by: INTERNAL MEDICINE

## 2021-04-27 RX ADMIN — IPRATROPIUM BROMIDE AND ALBUTEROL SULFATE 3 ML: .5; 3 SOLUTION RESPIRATORY (INHALATION) at 01:04

## 2021-04-27 RX ADMIN — MEROPENEM AND SODIUM CHLORIDE 1 G: 1 INJECTION, SOLUTION INTRAVENOUS at 12:04

## 2021-04-27 RX ADMIN — MEROPENEM AND SODIUM CHLORIDE 1 G: 1 INJECTION, SOLUTION INTRAVENOUS at 05:04

## 2021-04-27 RX ADMIN — MIDODRINE HYDROCHLORIDE 10 MG: 5 TABLET ORAL at 08:04

## 2021-04-27 RX ADMIN — SCOPOLAMINE 1 PATCH: 1.5 PATCH, EXTENDED RELEASE TRANSDERMAL at 01:04

## 2021-04-27 RX ADMIN — FAMOTIDINE 20 MG: 20 INJECTION, SOLUTION INTRAVENOUS at 08:04

## 2021-04-27 RX ADMIN — IPRATROPIUM BROMIDE AND ALBUTEROL SULFATE 3 ML: .5; 3 SOLUTION RESPIRATORY (INHALATION) at 07:04

## 2021-04-27 RX ADMIN — ENOXAPARIN SODIUM 40 MG: 40 INJECTION SUBCUTANEOUS at 05:04

## 2021-04-27 RX ADMIN — MIDODRINE HYDROCHLORIDE 10 MG: 5 TABLET ORAL at 09:04

## 2021-04-27 RX ADMIN — Medication 10 ML: at 05:04

## 2021-04-27 RX ADMIN — MEROPENEM AND SODIUM CHLORIDE 1 G: 1 INJECTION, SOLUTION INTRAVENOUS at 08:04

## 2021-04-27 RX ADMIN — Medication 10 ML: at 12:04

## 2021-04-27 RX ADMIN — MAGNESIUM OXIDE 400 MG (241.3 MG MAGNESIUM) TABLET 400 MG: TABLET at 08:04

## 2021-04-27 RX ADMIN — MIDODRINE HYDROCHLORIDE 10 MG: 5 TABLET ORAL at 03:04

## 2021-04-27 RX ADMIN — FAMOTIDINE 20 MG: 20 INJECTION, SOLUTION INTRAVENOUS at 09:04

## 2021-04-28 PROBLEM — J69.0 ASPIRATION PNEUMONIA OF RIGHT LUNG: Status: RESOLVED | Noted: 2021-04-21 | Resolved: 2021-04-28

## 2021-04-28 PROBLEM — J69.0 ASPIRATION PNEUMONIA OF RIGHT LUNG: Status: ACTIVE | Noted: 2021-04-28

## 2021-04-28 PROBLEM — J40 TRACHEOBRONCHITIS: Status: ACTIVE | Noted: 2021-04-21

## 2021-04-28 LAB
ANION GAP SERPL CALC-SCNC: 6 MMOL/L (ref 8–16)
BUN SERPL-MCNC: 11 MG/DL (ref 6–20)
CALCIUM SERPL-MCNC: 9.3 MG/DL (ref 8.7–10.5)
CHLORIDE SERPL-SCNC: 105 MMOL/L (ref 95–110)
CO2 SERPL-SCNC: 31 MMOL/L (ref 23–29)
CORTIS SERPL-MCNC: 10.1 UG/DL (ref 4.3–22.4)
CREAT SERPL-MCNC: 0.5 MG/DL (ref 0.5–1.4)
EST. GFR  (AFRICAN AMERICAN): >60 ML/MIN/1.73 M^2
EST. GFR  (NON AFRICAN AMERICAN): >60 ML/MIN/1.73 M^2
GLUCOSE SERPL-MCNC: 86 MG/DL (ref 70–110)
POTASSIUM SERPL-SCNC: 3.9 MMOL/L (ref 3.5–5.1)
SODIUM SERPL-SCNC: 142 MMOL/L (ref 136–145)

## 2021-04-28 PROCEDURE — 20000000 HC ICU ROOM

## 2021-04-28 PROCEDURE — S0028 INJECTION, FAMOTIDINE, 20 MG: HCPCS | Performed by: STUDENT IN AN ORGANIZED HEALTH CARE EDUCATION/TRAINING PROGRAM

## 2021-04-28 PROCEDURE — 80048 BASIC METABOLIC PNL TOTAL CA: CPT | Performed by: INTERNAL MEDICINE

## 2021-04-28 PROCEDURE — 63600175 PHARM REV CODE 636 W HCPCS: Performed by: INTERNAL MEDICINE

## 2021-04-28 PROCEDURE — 82533 TOTAL CORTISOL: CPT | Performed by: HOSPITALIST

## 2021-04-28 PROCEDURE — 99900026 HC AIRWAY MAINTENANCE (STAT)

## 2021-04-28 PROCEDURE — 25000003 PHARM REV CODE 250: Performed by: STUDENT IN AN ORGANIZED HEALTH CARE EDUCATION/TRAINING PROGRAM

## 2021-04-28 PROCEDURE — 99900035 HC TECH TIME PER 15 MIN (STAT)

## 2021-04-28 PROCEDURE — 94640 AIRWAY INHALATION TREATMENT: CPT

## 2021-04-28 PROCEDURE — A4216 STERILE WATER/SALINE, 10 ML: HCPCS | Performed by: STUDENT IN AN ORGANIZED HEALTH CARE EDUCATION/TRAINING PROGRAM

## 2021-04-28 PROCEDURE — 94761 N-INVAS EAR/PLS OXIMETRY MLT: CPT

## 2021-04-28 PROCEDURE — 25000242 PHARM REV CODE 250 ALT 637 W/ HCPCS: Performed by: INTERNAL MEDICINE

## 2021-04-28 PROCEDURE — 25000003 PHARM REV CODE 250: Performed by: INTERNAL MEDICINE

## 2021-04-28 PROCEDURE — 63600175 PHARM REV CODE 636 W HCPCS: Performed by: STUDENT IN AN ORGANIZED HEALTH CARE EDUCATION/TRAINING PROGRAM

## 2021-04-28 RX ADMIN — IPRATROPIUM BROMIDE AND ALBUTEROL SULFATE 3 ML: .5; 3 SOLUTION RESPIRATORY (INHALATION) at 07:04

## 2021-04-28 RX ADMIN — MIDODRINE HYDROCHLORIDE 10 MG: 5 TABLET ORAL at 08:04

## 2021-04-28 RX ADMIN — Medication 10 ML: at 05:04

## 2021-04-28 RX ADMIN — MEROPENEM AND SODIUM CHLORIDE 1 G: 1 INJECTION, SOLUTION INTRAVENOUS at 01:04

## 2021-04-28 RX ADMIN — MEROPENEM AND SODIUM CHLORIDE 1 G: 1 INJECTION, SOLUTION INTRAVENOUS at 05:04

## 2021-04-28 RX ADMIN — IPRATROPIUM BROMIDE AND ALBUTEROL SULFATE 3 ML: .5; 3 SOLUTION RESPIRATORY (INHALATION) at 12:04

## 2021-04-28 RX ADMIN — MIDODRINE HYDROCHLORIDE 10 MG: 5 TABLET ORAL at 03:04

## 2021-04-28 RX ADMIN — Medication 10 ML: at 12:04

## 2021-04-28 RX ADMIN — IPRATROPIUM BROMIDE AND ALBUTEROL SULFATE 3 ML: .5; 3 SOLUTION RESPIRATORY (INHALATION) at 01:04

## 2021-04-28 RX ADMIN — FAMOTIDINE 20 MG: 20 INJECTION, SOLUTION INTRAVENOUS at 08:04

## 2021-04-28 RX ADMIN — MEROPENEM AND SODIUM CHLORIDE 1 G: 1 INJECTION, SOLUTION INTRAVENOUS at 08:04

## 2021-04-28 RX ADMIN — MAGNESIUM OXIDE 400 MG (241.3 MG MAGNESIUM) TABLET 400 MG: TABLET at 08:04

## 2021-04-28 RX ADMIN — Medication 10 ML: at 01:04

## 2021-04-28 RX ADMIN — ENOXAPARIN SODIUM 40 MG: 40 INJECTION SUBCUTANEOUS at 05:04

## 2021-04-29 LAB
ALLENS TEST: ABNORMAL
ANION GAP SERPL CALC-SCNC: 5 MMOL/L (ref 8–16)
BACTERIA BLD CULT: NORMAL
BACTERIA BLD CULT: NORMAL
BASOPHILS # BLD AUTO: 0.01 K/UL (ref 0–0.2)
BASOPHILS NFR BLD: 0.2 % (ref 0–1.9)
BUN SERPL-MCNC: 13 MG/DL (ref 6–20)
CALCIUM SERPL-MCNC: 9 MG/DL (ref 8.7–10.5)
CHLORIDE SERPL-SCNC: 105 MMOL/L (ref 95–110)
CO2 SERPL-SCNC: 30 MMOL/L (ref 23–29)
CREAT SERPL-MCNC: 0.6 MG/DL (ref 0.5–1.4)
DELSYS: ABNORMAL
DIFFERENTIAL METHOD: ABNORMAL
EOSINOPHIL # BLD AUTO: 0.2 K/UL (ref 0–0.5)
EOSINOPHIL NFR BLD: 3.4 % (ref 0–8)
ERYTHROCYTE [DISTWIDTH] IN BLOOD BY AUTOMATED COUNT: 19.2 % (ref 11.5–14.5)
EST. GFR  (AFRICAN AMERICAN): >60 ML/MIN/1.73 M^2
EST. GFR  (NON AFRICAN AMERICAN): >60 ML/MIN/1.73 M^2
FIO2: 28
FLOW: 6
GLUCOSE SERPL-MCNC: 100 MG/DL (ref 70–110)
HCO3 UR-SCNC: 31.3 MMOL/L (ref 24–28)
HCT VFR BLD AUTO: 28 % (ref 37–48.5)
HGB BLD-MCNC: 9 G/DL (ref 12–16)
IMM GRANULOCYTES # BLD AUTO: 0.02 K/UL (ref 0–0.04)
IMM GRANULOCYTES NFR BLD AUTO: 0.3 % (ref 0–0.5)
LYMPHOCYTES # BLD AUTO: 1.4 K/UL (ref 1–4.8)
LYMPHOCYTES NFR BLD: 22.8 % (ref 18–48)
MCH RBC QN AUTO: 30.1 PG (ref 27–31)
MCHC RBC AUTO-ENTMCNC: 32.1 G/DL (ref 32–36)
MCV RBC AUTO: 94 FL (ref 82–98)
MODE: ABNORMAL
MONOCYTES # BLD AUTO: 0.4 K/UL (ref 0.3–1)
MONOCYTES NFR BLD: 6.3 % (ref 4–15)
NEUTROPHILS # BLD AUTO: 4.1 K/UL (ref 1.8–7.7)
NEUTROPHILS NFR BLD: 67 % (ref 38–73)
NRBC BLD-RTO: 0 /100 WBC
PCO2 BLDA: 47.4 MMHG (ref 35–45)
PH SMN: 7.43 [PH] (ref 7.35–7.45)
PLATELET # BLD AUTO: 204 K/UL (ref 150–450)
PMV BLD AUTO: 10.8 FL (ref 9.2–12.9)
PO2 BLDA: 113 MMHG (ref 80–100)
POC BE: 6 MMOL/L
POC SATURATED O2: 98 % (ref 95–100)
POC TCO2: 33 MMOL/L (ref 23–27)
POTASSIUM SERPL-SCNC: 4.1 MMOL/L (ref 3.5–5.1)
RBC # BLD AUTO: 2.99 M/UL (ref 4–5.4)
SAMPLE: ABNORMAL
SITE: ABNORMAL
SODIUM SERPL-SCNC: 140 MMOL/L (ref 136–145)
SP02: 99
WBC # BLD AUTO: 6.18 K/UL (ref 3.9–12.7)

## 2021-04-29 PROCEDURE — 36600 WITHDRAWAL OF ARTERIAL BLOOD: CPT

## 2021-04-29 PROCEDURE — 94667 MNPJ CHEST WALL 1ST: CPT

## 2021-04-29 PROCEDURE — 94640 AIRWAY INHALATION TREATMENT: CPT

## 2021-04-29 PROCEDURE — S0028 INJECTION, FAMOTIDINE, 20 MG: HCPCS | Performed by: STUDENT IN AN ORGANIZED HEALTH CARE EDUCATION/TRAINING PROGRAM

## 2021-04-29 PROCEDURE — 85025 COMPLETE CBC W/AUTO DIFF WBC: CPT | Performed by: HOSPITALIST

## 2021-04-29 PROCEDURE — 82803 BLOOD GASES ANY COMBINATION: CPT

## 2021-04-29 PROCEDURE — 99900026 HC AIRWAY MAINTENANCE (STAT)

## 2021-04-29 PROCEDURE — 94761 N-INVAS EAR/PLS OXIMETRY MLT: CPT

## 2021-04-29 PROCEDURE — 63600175 PHARM REV CODE 636 W HCPCS: Performed by: STUDENT IN AN ORGANIZED HEALTH CARE EDUCATION/TRAINING PROGRAM

## 2021-04-29 PROCEDURE — 80048 BASIC METABOLIC PNL TOTAL CA: CPT | Performed by: INTERNAL MEDICINE

## 2021-04-29 PROCEDURE — 25000003 PHARM REV CODE 250: Performed by: INTERNAL MEDICINE

## 2021-04-29 PROCEDURE — A4216 STERILE WATER/SALINE, 10 ML: HCPCS | Performed by: STUDENT IN AN ORGANIZED HEALTH CARE EDUCATION/TRAINING PROGRAM

## 2021-04-29 PROCEDURE — 25000003 PHARM REV CODE 250: Performed by: STUDENT IN AN ORGANIZED HEALTH CARE EDUCATION/TRAINING PROGRAM

## 2021-04-29 PROCEDURE — 63600175 PHARM REV CODE 636 W HCPCS: Performed by: INTERNAL MEDICINE

## 2021-04-29 PROCEDURE — 99900035 HC TECH TIME PER 15 MIN (STAT)

## 2021-04-29 PROCEDURE — 94668 MNPJ CHEST WALL SBSQ: CPT

## 2021-04-29 PROCEDURE — 25000242 PHARM REV CODE 250 ALT 637 W/ HCPCS: Performed by: INTERNAL MEDICINE

## 2021-04-29 PROCEDURE — 27200966 HC CLOSED SUCTION SYSTEM

## 2021-04-29 PROCEDURE — 20000000 HC ICU ROOM

## 2021-04-29 PROCEDURE — 27000221 HC OXYGEN, UP TO 24 HOURS

## 2021-04-29 RX ORDER — NOREPINEPHRINE BITARTRATE/D5W 4MG/250ML
0-3 PLASTIC BAG, INJECTION (ML) INTRAVENOUS CONTINUOUS
Status: DISCONTINUED | OUTPATIENT
Start: 2021-04-29 | End: 2021-04-30

## 2021-04-29 RX ADMIN — MIDODRINE HYDROCHLORIDE 10 MG: 5 TABLET ORAL at 02:04

## 2021-04-29 RX ADMIN — Medication 10 ML: at 12:04

## 2021-04-29 RX ADMIN — Medication 0.02 MCG/KG/MIN: at 02:04

## 2021-04-29 RX ADMIN — IPRATROPIUM BROMIDE AND ALBUTEROL SULFATE 3 ML: .5; 3 SOLUTION RESPIRATORY (INHALATION) at 07:04

## 2021-04-29 RX ADMIN — IPRATROPIUM BROMIDE AND ALBUTEROL SULFATE 3 ML: .5; 3 SOLUTION RESPIRATORY (INHALATION) at 01:04

## 2021-04-29 RX ADMIN — IPRATROPIUM BROMIDE AND ALBUTEROL SULFATE 3 ML: .5; 3 SOLUTION RESPIRATORY (INHALATION) at 08:04

## 2021-04-29 RX ADMIN — MIDODRINE HYDROCHLORIDE 10 MG: 5 TABLET ORAL at 08:04

## 2021-04-29 RX ADMIN — Medication 10 ML: at 05:04

## 2021-04-29 RX ADMIN — MEROPENEM AND SODIUM CHLORIDE 1 G: 1 INJECTION, SOLUTION INTRAVENOUS at 08:04

## 2021-04-29 RX ADMIN — FAMOTIDINE 20 MG: 20 INJECTION, SOLUTION INTRAVENOUS at 08:04

## 2021-04-29 RX ADMIN — ENOXAPARIN SODIUM 40 MG: 40 INJECTION SUBCUTANEOUS at 04:04

## 2021-04-29 RX ADMIN — MEROPENEM AND SODIUM CHLORIDE 1 G: 1 INJECTION, SOLUTION INTRAVENOUS at 04:04

## 2021-04-29 RX ADMIN — FENTANYL CITRATE 50 MCG: 50 INJECTION, SOLUTION INTRAMUSCULAR; INTRAVENOUS at 08:04

## 2021-04-29 RX ADMIN — IPRATROPIUM BROMIDE AND ALBUTEROL SULFATE 3 ML: .5; 3 SOLUTION RESPIRATORY (INHALATION) at 12:04

## 2021-04-29 RX ADMIN — MAGNESIUM OXIDE 400 MG (241.3 MG MAGNESIUM) TABLET 400 MG: TABLET at 08:04

## 2021-04-29 RX ADMIN — Medication 10 ML: at 01:04

## 2021-04-29 RX ADMIN — MEROPENEM AND SODIUM CHLORIDE 1 G: 1 INJECTION, SOLUTION INTRAVENOUS at 12:04

## 2021-04-30 PROBLEM — J69.0 ASPIRATION PNEUMONIA OF RIGHT LUNG: Status: ACTIVE | Noted: 2021-04-30

## 2021-04-30 PROBLEM — J69.0 ASPIRATION PNEUMONIA OF RIGHT LUNG: Status: RESOLVED | Noted: 2021-04-28 | Resolved: 2021-04-30

## 2021-04-30 LAB
ANION GAP SERPL CALC-SCNC: 7 MMOL/L (ref 8–16)
BUN SERPL-MCNC: 13 MG/DL (ref 6–20)
CALCIUM SERPL-MCNC: 9.3 MG/DL (ref 8.7–10.5)
CHLORIDE SERPL-SCNC: 105 MMOL/L (ref 95–110)
CO2 SERPL-SCNC: 31 MMOL/L (ref 23–29)
CREAT SERPL-MCNC: 0.5 MG/DL (ref 0.5–1.4)
EST. GFR  (AFRICAN AMERICAN): >60 ML/MIN/1.73 M^2
EST. GFR  (NON AFRICAN AMERICAN): >60 ML/MIN/1.73 M^2
GLUCOSE SERPL-MCNC: 99 MG/DL (ref 70–110)
POTASSIUM SERPL-SCNC: 4.3 MMOL/L (ref 3.5–5.1)
SODIUM SERPL-SCNC: 143 MMOL/L (ref 136–145)

## 2021-04-30 PROCEDURE — A4216 STERILE WATER/SALINE, 10 ML: HCPCS | Performed by: STUDENT IN AN ORGANIZED HEALTH CARE EDUCATION/TRAINING PROGRAM

## 2021-04-30 PROCEDURE — 11000001 HC ACUTE MED/SURG PRIVATE ROOM

## 2021-04-30 PROCEDURE — 25000003 PHARM REV CODE 250: Performed by: INTERNAL MEDICINE

## 2021-04-30 PROCEDURE — 27200966 HC CLOSED SUCTION SYSTEM

## 2021-04-30 PROCEDURE — 25000242 PHARM REV CODE 250 ALT 637 W/ HCPCS: Performed by: INTERNAL MEDICINE

## 2021-04-30 PROCEDURE — 94640 AIRWAY INHALATION TREATMENT: CPT

## 2021-04-30 PROCEDURE — 25000003 PHARM REV CODE 250: Performed by: STUDENT IN AN ORGANIZED HEALTH CARE EDUCATION/TRAINING PROGRAM

## 2021-04-30 PROCEDURE — 99900026 HC AIRWAY MAINTENANCE (STAT)

## 2021-04-30 PROCEDURE — S0028 INJECTION, FAMOTIDINE, 20 MG: HCPCS | Performed by: STUDENT IN AN ORGANIZED HEALTH CARE EDUCATION/TRAINING PROGRAM

## 2021-04-30 PROCEDURE — 63600175 PHARM REV CODE 636 W HCPCS: Performed by: INTERNAL MEDICINE

## 2021-04-30 PROCEDURE — 80048 BASIC METABOLIC PNL TOTAL CA: CPT | Performed by: INTERNAL MEDICINE

## 2021-04-30 PROCEDURE — 94761 N-INVAS EAR/PLS OXIMETRY MLT: CPT

## 2021-04-30 PROCEDURE — 94668 MNPJ CHEST WALL SBSQ: CPT

## 2021-04-30 PROCEDURE — 63600175 PHARM REV CODE 636 W HCPCS: Performed by: STUDENT IN AN ORGANIZED HEALTH CARE EDUCATION/TRAINING PROGRAM

## 2021-04-30 PROCEDURE — 27000221 HC OXYGEN, UP TO 24 HOURS

## 2021-04-30 RX ADMIN — MEROPENEM AND SODIUM CHLORIDE 1 G: 1 INJECTION, SOLUTION INTRAVENOUS at 01:04

## 2021-04-30 RX ADMIN — MIDODRINE HYDROCHLORIDE 10 MG: 5 TABLET ORAL at 08:04

## 2021-04-30 RX ADMIN — MIDODRINE HYDROCHLORIDE 10 MG: 5 TABLET ORAL at 03:04

## 2021-04-30 RX ADMIN — IPRATROPIUM BROMIDE AND ALBUTEROL SULFATE 3 ML: .5; 3 SOLUTION RESPIRATORY (INHALATION) at 12:04

## 2021-04-30 RX ADMIN — FAMOTIDINE 20 MG: 20 INJECTION, SOLUTION INTRAVENOUS at 08:04

## 2021-04-30 RX ADMIN — Medication 10 ML: at 12:04

## 2021-04-30 RX ADMIN — ENOXAPARIN SODIUM 40 MG: 40 INJECTION SUBCUTANEOUS at 04:04

## 2021-04-30 RX ADMIN — MEROPENEM AND SODIUM CHLORIDE 1 G: 1 INJECTION, SOLUTION INTRAVENOUS at 04:04

## 2021-04-30 RX ADMIN — IPRATROPIUM BROMIDE AND ALBUTEROL SULFATE 3 ML: .5; 3 SOLUTION RESPIRATORY (INHALATION) at 01:04

## 2021-04-30 RX ADMIN — FAMOTIDINE 20 MG: 20 INJECTION, SOLUTION INTRAVENOUS at 09:04

## 2021-04-30 RX ADMIN — MAGNESIUM OXIDE 400 MG (241.3 MG MAGNESIUM) TABLET 400 MG: TABLET at 08:04

## 2021-04-30 RX ADMIN — Medication 10 ML: at 07:04

## 2021-04-30 RX ADMIN — IPRATROPIUM BROMIDE AND ALBUTEROL SULFATE 3 ML: .5; 3 SOLUTION RESPIRATORY (INHALATION) at 07:04

## 2021-04-30 RX ADMIN — MEROPENEM AND SODIUM CHLORIDE 1 G: 1 INJECTION, SOLUTION INTRAVENOUS at 08:04

## 2021-04-30 RX ADMIN — SCOPOLAMINE 1 PATCH: 1.5 PATCH, EXTENDED RELEASE TRANSDERMAL at 02:04

## 2021-04-30 RX ADMIN — Medication 10 ML: at 06:04

## 2021-04-30 RX ADMIN — MIDODRINE HYDROCHLORIDE 10 MG: 5 TABLET ORAL at 09:04

## 2021-05-01 PROBLEM — Z74.01 BEDRIDDEN: Status: ACTIVE | Noted: 2021-05-01

## 2021-05-01 PROBLEM — Z93.1 PEG (PERCUTANEOUS ENDOSCOPIC GASTROSTOMY) STATUS: Status: ACTIVE | Noted: 2021-05-01

## 2021-05-01 PROBLEM — A41.9 SEPTIC SHOCK: Status: ACTIVE | Noted: 2021-04-22

## 2021-05-01 PROBLEM — Z93.0 TRACHEOSTOMY DEPENDENCE: Status: ACTIVE | Noted: 2021-05-01

## 2021-05-01 PROBLEM — R65.21 SEPTIC SHOCK: Status: ACTIVE | Noted: 2021-04-22

## 2021-05-01 LAB
ALBUMIN SERPL BCP-MCNC: 2.8 G/DL (ref 3.5–5.2)
ALP SERPL-CCNC: 193 U/L (ref 55–135)
ALT SERPL W/O P-5'-P-CCNC: 77 U/L (ref 10–44)
ANION GAP SERPL CALC-SCNC: 10 MMOL/L (ref 8–16)
AST SERPL-CCNC: 66 U/L (ref 10–40)
BILIRUB SERPL-MCNC: 0.4 MG/DL (ref 0.1–1)
BUN SERPL-MCNC: 13 MG/DL (ref 6–20)
CALCIUM SERPL-MCNC: 9.8 MG/DL (ref 8.7–10.5)
CHLORIDE SERPL-SCNC: 104 MMOL/L (ref 95–110)
CO2 SERPL-SCNC: 29 MMOL/L (ref 23–29)
CORTIS SERPL-MCNC: 12 UG/DL
CREAT SERPL-MCNC: 0.5 MG/DL (ref 0.5–1.4)
EST. GFR  (AFRICAN AMERICAN): >60 ML/MIN/1.73 M^2
EST. GFR  (NON AFRICAN AMERICAN): >60 ML/MIN/1.73 M^2
GLUCOSE SERPL-MCNC: 89 MG/DL (ref 70–110)
MAGNESIUM SERPL-MCNC: 1.8 MG/DL (ref 1.6–2.6)
PHOSPHATE SERPL-MCNC: 2.8 MG/DL (ref 2.7–4.5)
POCT GLUCOSE: 81 MG/DL (ref 70–110)
POCT GLUCOSE: 90 MG/DL (ref 70–110)
POCT GLUCOSE: 93 MG/DL (ref 70–110)
POTASSIUM SERPL-SCNC: 4.5 MMOL/L (ref 3.5–5.1)
PROT SERPL-MCNC: 8.4 G/DL (ref 6–8.4)
SODIUM SERPL-SCNC: 143 MMOL/L (ref 136–145)

## 2021-05-01 PROCEDURE — 25000003 PHARM REV CODE 250: Performed by: HOSPITALIST

## 2021-05-01 PROCEDURE — 63600175 PHARM REV CODE 636 W HCPCS: Performed by: HOSPITALIST

## 2021-05-01 PROCEDURE — A4216 STERILE WATER/SALINE, 10 ML: HCPCS | Performed by: HOSPITALIST

## 2021-05-01 PROCEDURE — 94640 AIRWAY INHALATION TREATMENT: CPT

## 2021-05-01 PROCEDURE — 80053 COMPREHEN METABOLIC PANEL: CPT | Performed by: INTERNAL MEDICINE

## 2021-05-01 PROCEDURE — 83735 ASSAY OF MAGNESIUM: CPT | Performed by: INTERNAL MEDICINE

## 2021-05-01 PROCEDURE — 94761 N-INVAS EAR/PLS OXIMETRY MLT: CPT

## 2021-05-01 PROCEDURE — 94668 MNPJ CHEST WALL SBSQ: CPT

## 2021-05-01 PROCEDURE — 82533 TOTAL CORTISOL: CPT | Performed by: INTERNAL MEDICINE

## 2021-05-01 PROCEDURE — A4216 STERILE WATER/SALINE, 10 ML: HCPCS | Performed by: STUDENT IN AN ORGANIZED HEALTH CARE EDUCATION/TRAINING PROGRAM

## 2021-05-01 PROCEDURE — 99900026 HC AIRWAY MAINTENANCE (STAT)

## 2021-05-01 PROCEDURE — 84100 ASSAY OF PHOSPHORUS: CPT | Performed by: INTERNAL MEDICINE

## 2021-05-01 PROCEDURE — S0028 INJECTION, FAMOTIDINE, 20 MG: HCPCS | Performed by: HOSPITALIST

## 2021-05-01 PROCEDURE — 27200966 HC CLOSED SUCTION SYSTEM

## 2021-05-01 PROCEDURE — 63600175 PHARM REV CODE 636 W HCPCS: Performed by: INTERNAL MEDICINE

## 2021-05-01 PROCEDURE — 25000003 PHARM REV CODE 250: Performed by: STUDENT IN AN ORGANIZED HEALTH CARE EDUCATION/TRAINING PROGRAM

## 2021-05-01 PROCEDURE — 11000001 HC ACUTE MED/SURG PRIVATE ROOM

## 2021-05-01 PROCEDURE — 25000242 PHARM REV CODE 250 ALT 637 W/ HCPCS: Performed by: INTERNAL MEDICINE

## 2021-05-01 PROCEDURE — 63600175 PHARM REV CODE 636 W HCPCS: Performed by: STUDENT IN AN ORGANIZED HEALTH CARE EDUCATION/TRAINING PROGRAM

## 2021-05-01 RX ORDER — IPRATROPIUM BROMIDE AND ALBUTEROL SULFATE 2.5; .5 MG/3ML; MG/3ML
3 SOLUTION RESPIRATORY (INHALATION) EVERY 4 HOURS
Status: DISCONTINUED | OUTPATIENT
Start: 2021-05-01 | End: 2021-05-04

## 2021-05-01 RX ADMIN — MEROPENEM AND SODIUM CHLORIDE 1 G: 1 INJECTION, SOLUTION INTRAVENOUS at 12:05

## 2021-05-01 RX ADMIN — IPRATROPIUM BROMIDE AND ALBUTEROL SULFATE 3 ML: .5; 3 SOLUTION RESPIRATORY (INHALATION) at 11:05

## 2021-05-01 RX ADMIN — Medication 10 ML: at 12:05

## 2021-05-01 RX ADMIN — FAMOTIDINE 20 MG: 20 INJECTION, SOLUTION INTRAVENOUS at 09:05

## 2021-05-01 RX ADMIN — Medication 10 ML: at 06:05

## 2021-05-01 RX ADMIN — HYDROCORTISONE SODIUM SUCCINATE 50 MG: 100 INJECTION, POWDER, FOR SOLUTION INTRAMUSCULAR; INTRAVENOUS at 02:05

## 2021-05-01 RX ADMIN — MIDODRINE HYDROCHLORIDE 10 MG: 5 TABLET ORAL at 02:05

## 2021-05-01 RX ADMIN — MAGNESIUM OXIDE 400 MG (241.3 MG MAGNESIUM) TABLET 400 MG: TABLET at 10:05

## 2021-05-01 RX ADMIN — IPRATROPIUM BROMIDE AND ALBUTEROL SULFATE 3 ML: .5; 3 SOLUTION RESPIRATORY (INHALATION) at 12:05

## 2021-05-01 RX ADMIN — MEROPENEM AND SODIUM CHLORIDE 1 G: 1 INJECTION, SOLUTION INTRAVENOUS at 04:05

## 2021-05-01 RX ADMIN — HYDROCORTISONE SODIUM SUCCINATE 50 MG: 100 INJECTION, POWDER, FOR SOLUTION INTRAMUSCULAR; INTRAVENOUS at 11:05

## 2021-05-01 RX ADMIN — MIDODRINE HYDROCHLORIDE 10 MG: 5 TABLET ORAL at 09:05

## 2021-05-01 RX ADMIN — IPRATROPIUM BROMIDE AND ALBUTEROL SULFATE 3 ML: .5; 3 SOLUTION RESPIRATORY (INHALATION) at 07:05

## 2021-05-01 RX ADMIN — ENOXAPARIN SODIUM 40 MG: 40 INJECTION SUBCUTANEOUS at 04:05

## 2021-05-01 RX ADMIN — MEROPENEM AND SODIUM CHLORIDE 1 G: 1 INJECTION, SOLUTION INTRAVENOUS at 10:05

## 2021-05-01 RX ADMIN — IPRATROPIUM BROMIDE AND ALBUTEROL SULFATE 3 ML: .5; 3 SOLUTION RESPIRATORY (INHALATION) at 03:05

## 2021-05-01 RX ADMIN — IPRATROPIUM BROMIDE AND ALBUTEROL SULFATE 3 ML: .5; 3 SOLUTION RESPIRATORY (INHALATION) at 08:05

## 2021-05-01 RX ADMIN — MIDODRINE HYDROCHLORIDE 10 MG: 5 TABLET ORAL at 10:05

## 2021-05-02 LAB
POCT GLUCOSE: 105 MG/DL (ref 70–110)
POCT GLUCOSE: 75 MG/DL (ref 70–110)
POCT GLUCOSE: 89 MG/DL (ref 70–110)
POCT GLUCOSE: 93 MG/DL (ref 70–110)

## 2021-05-02 PROCEDURE — 25000242 PHARM REV CODE 250 ALT 637 W/ HCPCS: Performed by: INTERNAL MEDICINE

## 2021-05-02 PROCEDURE — 63600175 PHARM REV CODE 636 W HCPCS: Performed by: INTERNAL MEDICINE

## 2021-05-02 PROCEDURE — 94668 MNPJ CHEST WALL SBSQ: CPT

## 2021-05-02 PROCEDURE — 11000001 HC ACUTE MED/SURG PRIVATE ROOM

## 2021-05-02 PROCEDURE — A4216 STERILE WATER/SALINE, 10 ML: HCPCS | Performed by: HOSPITALIST

## 2021-05-02 PROCEDURE — 63600175 PHARM REV CODE 636 W HCPCS: Performed by: HOSPITALIST

## 2021-05-02 PROCEDURE — 94640 AIRWAY INHALATION TREATMENT: CPT

## 2021-05-02 PROCEDURE — 99900035 HC TECH TIME PER 15 MIN (STAT)

## 2021-05-02 PROCEDURE — 25000003 PHARM REV CODE 250: Performed by: HOSPITALIST

## 2021-05-02 PROCEDURE — 99900026 HC AIRWAY MAINTENANCE (STAT)

## 2021-05-02 PROCEDURE — 27200966 HC CLOSED SUCTION SYSTEM

## 2021-05-02 PROCEDURE — S0028 INJECTION, FAMOTIDINE, 20 MG: HCPCS | Performed by: HOSPITALIST

## 2021-05-02 PROCEDURE — 94761 N-INVAS EAR/PLS OXIMETRY MLT: CPT

## 2021-05-02 RX ADMIN — IPRATROPIUM BROMIDE AND ALBUTEROL SULFATE 3 ML: .5; 3 SOLUTION RESPIRATORY (INHALATION) at 11:05

## 2021-05-02 RX ADMIN — HYDROCORTISONE SODIUM SUCCINATE 50 MG: 100 INJECTION, POWDER, FOR SOLUTION INTRAMUSCULAR; INTRAVENOUS at 05:05

## 2021-05-02 RX ADMIN — Medication 10 ML: at 12:05

## 2021-05-02 RX ADMIN — Medication 10 ML: at 06:05

## 2021-05-02 RX ADMIN — MIDODRINE HYDROCHLORIDE 10 MG: 5 TABLET ORAL at 02:05

## 2021-05-02 RX ADMIN — MAGNESIUM OXIDE 400 MG (241.3 MG MAGNESIUM) TABLET 400 MG: TABLET at 09:05

## 2021-05-02 RX ADMIN — FAMOTIDINE 20 MG: 20 INJECTION, SOLUTION INTRAVENOUS at 08:05

## 2021-05-02 RX ADMIN — Medication 10 ML: at 05:05

## 2021-05-02 RX ADMIN — FAMOTIDINE 20 MG: 20 INJECTION, SOLUTION INTRAVENOUS at 09:05

## 2021-05-02 RX ADMIN — ENOXAPARIN SODIUM 40 MG: 40 INJECTION SUBCUTANEOUS at 05:05

## 2021-05-02 RX ADMIN — MIDODRINE HYDROCHLORIDE 10 MG: 5 TABLET ORAL at 08:05

## 2021-05-02 RX ADMIN — IPRATROPIUM BROMIDE AND ALBUTEROL SULFATE 3 ML: .5; 3 SOLUTION RESPIRATORY (INHALATION) at 08:05

## 2021-05-02 RX ADMIN — HYDROCORTISONE SODIUM SUCCINATE 50 MG: 100 INJECTION, POWDER, FOR SOLUTION INTRAMUSCULAR; INTRAVENOUS at 11:05

## 2021-05-02 RX ADMIN — MIDODRINE HYDROCHLORIDE 10 MG: 5 TABLET ORAL at 09:05

## 2021-05-02 RX ADMIN — MEROPENEM AND SODIUM CHLORIDE 1 G: 1 INJECTION, SOLUTION INTRAVENOUS at 05:05

## 2021-05-02 RX ADMIN — MEROPENEM AND SODIUM CHLORIDE 1 G: 1 INJECTION, SOLUTION INTRAVENOUS at 09:05

## 2021-05-02 RX ADMIN — IPRATROPIUM BROMIDE AND ALBUTEROL SULFATE 3 ML: .5; 3 SOLUTION RESPIRATORY (INHALATION) at 03:05

## 2021-05-02 RX ADMIN — IPRATROPIUM BROMIDE AND ALBUTEROL SULFATE 3 ML: .5; 3 SOLUTION RESPIRATORY (INHALATION) at 07:05

## 2021-05-02 RX ADMIN — HYDROCORTISONE SODIUM SUCCINATE 50 MG: 100 INJECTION, POWDER, FOR SOLUTION INTRAMUSCULAR; INTRAVENOUS at 02:05

## 2021-05-02 RX ADMIN — MEROPENEM AND SODIUM CHLORIDE 1 G: 1 INJECTION, SOLUTION INTRAVENOUS at 02:05

## 2021-05-03 LAB
POCT GLUCOSE: 101 MG/DL (ref 70–110)
POCT GLUCOSE: 88 MG/DL (ref 70–110)

## 2021-05-03 PROCEDURE — S0028 INJECTION, FAMOTIDINE, 20 MG: HCPCS | Performed by: HOSPITALIST

## 2021-05-03 PROCEDURE — A4216 STERILE WATER/SALINE, 10 ML: HCPCS | Performed by: HOSPITALIST

## 2021-05-03 PROCEDURE — 27000221 HC OXYGEN, UP TO 24 HOURS

## 2021-05-03 PROCEDURE — 63600175 PHARM REV CODE 636 W HCPCS: Performed by: HOSPITALIST

## 2021-05-03 PROCEDURE — 94668 MNPJ CHEST WALL SBSQ: CPT

## 2021-05-03 PROCEDURE — 94761 N-INVAS EAR/PLS OXIMETRY MLT: CPT

## 2021-05-03 PROCEDURE — 27200966 HC CLOSED SUCTION SYSTEM

## 2021-05-03 PROCEDURE — 63600175 PHARM REV CODE 636 W HCPCS: Performed by: INTERNAL MEDICINE

## 2021-05-03 PROCEDURE — 94640 AIRWAY INHALATION TREATMENT: CPT

## 2021-05-03 PROCEDURE — 25000242 PHARM REV CODE 250 ALT 637 W/ HCPCS: Performed by: INTERNAL MEDICINE

## 2021-05-03 PROCEDURE — 25000003 PHARM REV CODE 250: Performed by: HOSPITALIST

## 2021-05-03 PROCEDURE — 11000001 HC ACUTE MED/SURG PRIVATE ROOM

## 2021-05-03 PROCEDURE — 99900026 HC AIRWAY MAINTENANCE (STAT)

## 2021-05-03 RX ADMIN — MIDODRINE HYDROCHLORIDE 10 MG: 5 TABLET ORAL at 09:05

## 2021-05-03 RX ADMIN — Medication 10 ML: at 05:05

## 2021-05-03 RX ADMIN — FAMOTIDINE 20 MG: 20 INJECTION, SOLUTION INTRAVENOUS at 09:05

## 2021-05-03 RX ADMIN — IPRATROPIUM BROMIDE AND ALBUTEROL SULFATE 3 ML: .5; 3 SOLUTION RESPIRATORY (INHALATION) at 12:05

## 2021-05-03 RX ADMIN — IPRATROPIUM BROMIDE AND ALBUTEROL SULFATE 3 ML: .5; 3 SOLUTION RESPIRATORY (INHALATION) at 04:05

## 2021-05-03 RX ADMIN — Medication 10 ML: at 01:05

## 2021-05-03 RX ADMIN — IPRATROPIUM BROMIDE AND ALBUTEROL SULFATE 3 ML: .5; 3 SOLUTION RESPIRATORY (INHALATION) at 08:05

## 2021-05-03 RX ADMIN — IPRATROPIUM BROMIDE AND ALBUTEROL SULFATE 3 ML: .5; 3 SOLUTION RESPIRATORY (INHALATION) at 07:05

## 2021-05-03 RX ADMIN — MEROPENEM AND SODIUM CHLORIDE 1 G: 1 INJECTION, SOLUTION INTRAVENOUS at 05:05

## 2021-05-03 RX ADMIN — MAGNESIUM OXIDE 400 MG (241.3 MG MAGNESIUM) TABLET 400 MG: TABLET at 09:05

## 2021-05-03 RX ADMIN — MEROPENEM AND SODIUM CHLORIDE 1 G: 1 INJECTION, SOLUTION INTRAVENOUS at 09:05

## 2021-05-03 RX ADMIN — Medication 10 ML: at 12:05

## 2021-05-03 RX ADMIN — MEROPENEM AND SODIUM CHLORIDE 1 G: 1 INJECTION, SOLUTION INTRAVENOUS at 02:05

## 2021-05-03 RX ADMIN — Medication 10 ML: at 06:05

## 2021-05-03 RX ADMIN — MIDODRINE HYDROCHLORIDE 10 MG: 5 TABLET ORAL at 03:05

## 2021-05-03 RX ADMIN — IPRATROPIUM BROMIDE AND ALBUTEROL SULFATE 3 ML: .5; 3 SOLUTION RESPIRATORY (INHALATION) at 11:05

## 2021-05-03 RX ADMIN — HYDROCORTISONE SODIUM SUCCINATE 50 MG: 100 INJECTION, POWDER, FOR SOLUTION INTRAMUSCULAR; INTRAVENOUS at 06:05

## 2021-05-03 RX ADMIN — ENOXAPARIN SODIUM 40 MG: 40 INJECTION SUBCUTANEOUS at 05:05

## 2021-05-03 RX ADMIN — IPRATROPIUM BROMIDE AND ALBUTEROL SULFATE 3 ML: .5; 3 SOLUTION RESPIRATORY (INHALATION) at 03:05

## 2021-05-03 RX ADMIN — SCOPOLAMINE 1 PATCH: 1.5 PATCH, EXTENDED RELEASE TRANSDERMAL at 12:05

## 2021-05-04 LAB
POCT GLUCOSE: 81 MG/DL (ref 70–110)
POCT GLUCOSE: 93 MG/DL (ref 70–110)
POCT GLUCOSE: 99 MG/DL (ref 70–110)

## 2021-05-04 PROCEDURE — 94761 N-INVAS EAR/PLS OXIMETRY MLT: CPT

## 2021-05-04 PROCEDURE — S0028 INJECTION, FAMOTIDINE, 20 MG: HCPCS | Performed by: HOSPITALIST

## 2021-05-04 PROCEDURE — 27200966 HC CLOSED SUCTION SYSTEM

## 2021-05-04 PROCEDURE — 27000221 HC OXYGEN, UP TO 24 HOURS

## 2021-05-04 PROCEDURE — 63600175 PHARM REV CODE 636 W HCPCS: Performed by: HOSPITALIST

## 2021-05-04 PROCEDURE — 25000003 PHARM REV CODE 250: Performed by: HOSPITALIST

## 2021-05-04 PROCEDURE — 99900026 HC AIRWAY MAINTENANCE (STAT)

## 2021-05-04 PROCEDURE — 94640 AIRWAY INHALATION TREATMENT: CPT

## 2021-05-04 PROCEDURE — 94668 MNPJ CHEST WALL SBSQ: CPT

## 2021-05-04 PROCEDURE — 11000001 HC ACUTE MED/SURG PRIVATE ROOM

## 2021-05-04 PROCEDURE — 25000242 PHARM REV CODE 250 ALT 637 W/ HCPCS: Performed by: INTERNAL MEDICINE

## 2021-05-04 PROCEDURE — A4216 STERILE WATER/SALINE, 10 ML: HCPCS | Performed by: HOSPITALIST

## 2021-05-04 RX ORDER — IPRATROPIUM BROMIDE AND ALBUTEROL SULFATE 2.5; .5 MG/3ML; MG/3ML
3 SOLUTION RESPIRATORY (INHALATION) EVERY 6 HOURS
Status: DISCONTINUED | OUTPATIENT
Start: 2021-05-05 | End: 2021-05-06 | Stop reason: HOSPADM

## 2021-05-04 RX ADMIN — MEROPENEM AND SODIUM CHLORIDE 1 G: 1 INJECTION, SOLUTION INTRAVENOUS at 04:05

## 2021-05-04 RX ADMIN — MIDODRINE HYDROCHLORIDE 10 MG: 5 TABLET ORAL at 09:05

## 2021-05-04 RX ADMIN — FAMOTIDINE 20 MG: 20 INJECTION, SOLUTION INTRAVENOUS at 09:05

## 2021-05-04 RX ADMIN — Medication 10 ML: at 12:05

## 2021-05-04 RX ADMIN — IPRATROPIUM BROMIDE AND ALBUTEROL SULFATE 3 ML: .5; 3 SOLUTION RESPIRATORY (INHALATION) at 08:05

## 2021-05-04 RX ADMIN — MEROPENEM AND SODIUM CHLORIDE 1 G: 1 INJECTION, SOLUTION INTRAVENOUS at 02:05

## 2021-05-04 RX ADMIN — IPRATROPIUM BROMIDE AND ALBUTEROL SULFATE 3 ML: .5; 3 SOLUTION RESPIRATORY (INHALATION) at 12:05

## 2021-05-04 RX ADMIN — IPRATROPIUM BROMIDE AND ALBUTEROL SULFATE 3 ML: .5; 3 SOLUTION RESPIRATORY (INHALATION) at 04:05

## 2021-05-04 RX ADMIN — MIDODRINE HYDROCHLORIDE 10 MG: 5 TABLET ORAL at 04:05

## 2021-05-04 RX ADMIN — ENOXAPARIN SODIUM 40 MG: 40 INJECTION SUBCUTANEOUS at 04:05

## 2021-05-04 RX ADMIN — MEROPENEM AND SODIUM CHLORIDE 1 G: 1 INJECTION, SOLUTION INTRAVENOUS at 09:05

## 2021-05-04 RX ADMIN — MIDODRINE HYDROCHLORIDE 10 MG: 5 TABLET ORAL at 10:05

## 2021-05-04 RX ADMIN — FAMOTIDINE 20 MG: 20 INJECTION, SOLUTION INTRAVENOUS at 10:05

## 2021-05-04 RX ADMIN — MAGNESIUM OXIDE 400 MG (241.3 MG MAGNESIUM) TABLET 400 MG: TABLET at 09:05

## 2021-05-04 RX ADMIN — IPRATROPIUM BROMIDE AND ALBUTEROL SULFATE 3 ML: .5; 3 SOLUTION RESPIRATORY (INHALATION) at 03:05

## 2021-05-04 RX ADMIN — Medication 10 ML: at 05:05

## 2021-05-04 RX ADMIN — Medication 10 ML: at 06:05

## 2021-05-05 VITALS
HEART RATE: 79 BPM | WEIGHT: 138.88 LBS | BODY MASS INDEX: 21.8 KG/M2 | SYSTOLIC BLOOD PRESSURE: 125 MMHG | TEMPERATURE: 97 F | HEIGHT: 67 IN | OXYGEN SATURATION: 93 % | RESPIRATION RATE: 25 BRPM | DIASTOLIC BLOOD PRESSURE: 74 MMHG

## 2021-05-05 LAB
ALBUMIN SERPL BCP-MCNC: 2.4 G/DL (ref 3.5–5.2)
ALP SERPL-CCNC: 167 U/L (ref 55–135)
ALT SERPL W/O P-5'-P-CCNC: 50 U/L (ref 10–44)
ANION GAP SERPL CALC-SCNC: 6 MMOL/L (ref 8–16)
AST SERPL-CCNC: 36 U/L (ref 10–40)
BASOPHILS # BLD AUTO: 0.02 K/UL (ref 0–0.2)
BASOPHILS NFR BLD: 0.4 % (ref 0–1.9)
BILIRUB SERPL-MCNC: 0.4 MG/DL (ref 0.1–1)
BUN SERPL-MCNC: 13 MG/DL (ref 6–20)
CALCIUM SERPL-MCNC: 9.1 MG/DL (ref 8.7–10.5)
CHLORIDE SERPL-SCNC: 106 MMOL/L (ref 95–110)
CO2 SERPL-SCNC: 29 MMOL/L (ref 23–29)
CREAT SERPL-MCNC: 0.5 MG/DL (ref 0.5–1.4)
DIFFERENTIAL METHOD: ABNORMAL
EOSINOPHIL # BLD AUTO: 0.2 K/UL (ref 0–0.5)
EOSINOPHIL NFR BLD: 3.4 % (ref 0–8)
ERYTHROCYTE [DISTWIDTH] IN BLOOD BY AUTOMATED COUNT: 19.7 % (ref 11.5–14.5)
EST. GFR  (AFRICAN AMERICAN): >60 ML/MIN/1.73 M^2
EST. GFR  (NON AFRICAN AMERICAN): >60 ML/MIN/1.73 M^2
GLUCOSE SERPL-MCNC: 87 MG/DL (ref 70–110)
HCT VFR BLD AUTO: 29 % (ref 37–48.5)
HGB BLD-MCNC: 8.7 G/DL (ref 12–16)
IMM GRANULOCYTES # BLD AUTO: 0.02 K/UL (ref 0–0.04)
IMM GRANULOCYTES NFR BLD AUTO: 0.4 % (ref 0–0.5)
LYMPHOCYTES # BLD AUTO: 1.6 K/UL (ref 1–4.8)
LYMPHOCYTES NFR BLD: 29.4 % (ref 18–48)
MAGNESIUM SERPL-MCNC: 1.7 MG/DL (ref 1.6–2.6)
MCH RBC QN AUTO: 29 PG (ref 27–31)
MCHC RBC AUTO-ENTMCNC: 30 G/DL (ref 32–36)
MCV RBC AUTO: 97 FL (ref 82–98)
MONOCYTES # BLD AUTO: 0.6 K/UL (ref 0.3–1)
MONOCYTES NFR BLD: 10.1 % (ref 4–15)
NEUTROPHILS # BLD AUTO: 3.1 K/UL (ref 1.8–7.7)
NEUTROPHILS NFR BLD: 56.3 % (ref 38–73)
NRBC BLD-RTO: 0 /100 WBC
PLATELET # BLD AUTO: 414 K/UL (ref 150–450)
PMV BLD AUTO: 11.3 FL (ref 9.2–12.9)
POCT GLUCOSE: 103 MG/DL (ref 70–110)
POCT GLUCOSE: 115 MG/DL (ref 70–110)
POCT GLUCOSE: 97 MG/DL (ref 70–110)
POTASSIUM SERPL-SCNC: 3.8 MMOL/L (ref 3.5–5.1)
PROT SERPL-MCNC: 7.1 G/DL (ref 6–8.4)
RBC # BLD AUTO: 3 M/UL (ref 4–5.4)
SODIUM SERPL-SCNC: 141 MMOL/L (ref 136–145)
WBC # BLD AUTO: 5.57 K/UL (ref 3.9–12.7)

## 2021-05-05 PROCEDURE — A4216 STERILE WATER/SALINE, 10 ML: HCPCS | Performed by: HOSPITALIST

## 2021-05-05 PROCEDURE — 99900026 HC AIRWAY MAINTENANCE (STAT)

## 2021-05-05 PROCEDURE — 27200966 HC CLOSED SUCTION SYSTEM

## 2021-05-05 PROCEDURE — 27000221 HC OXYGEN, UP TO 24 HOURS

## 2021-05-05 PROCEDURE — 25000003 PHARM REV CODE 250: Performed by: HOSPITALIST

## 2021-05-05 PROCEDURE — 63600175 PHARM REV CODE 636 W HCPCS: Performed by: HOSPITALIST

## 2021-05-05 PROCEDURE — 83735 ASSAY OF MAGNESIUM: CPT | Performed by: HOSPITALIST

## 2021-05-05 PROCEDURE — 94640 AIRWAY INHALATION TREATMENT: CPT

## 2021-05-05 PROCEDURE — 99900035 HC TECH TIME PER 15 MIN (STAT)

## 2021-05-05 PROCEDURE — 94668 MNPJ CHEST WALL SBSQ: CPT

## 2021-05-05 PROCEDURE — 94761 N-INVAS EAR/PLS OXIMETRY MLT: CPT

## 2021-05-05 PROCEDURE — 80053 COMPREHEN METABOLIC PANEL: CPT | Performed by: HOSPITALIST

## 2021-05-05 PROCEDURE — S0028 INJECTION, FAMOTIDINE, 20 MG: HCPCS | Performed by: HOSPITALIST

## 2021-05-05 PROCEDURE — 85025 COMPLETE CBC W/AUTO DIFF WBC: CPT | Performed by: HOSPITALIST

## 2021-05-05 PROCEDURE — 25000242 PHARM REV CODE 250 ALT 637 W/ HCPCS: Performed by: INTERNAL MEDICINE

## 2021-05-05 RX ORDER — MEROPENEM AND SODIUM CHLORIDE 1 G/50ML
INJECTION, SOLUTION INTRAVENOUS
Status: ON HOLD
Start: 2021-05-05 | End: 2021-05-13 | Stop reason: HOSPADM

## 2021-05-05 RX ORDER — MIDODRINE HYDROCHLORIDE 10 MG/1
10 TABLET ORAL 3 TIMES DAILY
Qty: 90 TABLET | Refills: 11 | Status: ON HOLD
Start: 2021-05-05 | End: 2023-08-01 | Stop reason: HOSPADM

## 2021-05-05 RX ADMIN — IPRATROPIUM BROMIDE AND ALBUTEROL SULFATE 3 ML: .5; 3 SOLUTION RESPIRATORY (INHALATION) at 08:05

## 2021-05-05 RX ADMIN — FAMOTIDINE 20 MG: 20 INJECTION, SOLUTION INTRAVENOUS at 09:05

## 2021-05-05 RX ADMIN — MEROPENEM AND SODIUM CHLORIDE 1 G: 1 INJECTION, SOLUTION INTRAVENOUS at 02:05

## 2021-05-05 RX ADMIN — Medication 10 ML: at 06:05

## 2021-05-05 RX ADMIN — MEROPENEM AND SODIUM CHLORIDE 1 G: 1 INJECTION, SOLUTION INTRAVENOUS at 09:05

## 2021-05-05 RX ADMIN — IPRATROPIUM BROMIDE AND ALBUTEROL SULFATE 3 ML: .5; 3 SOLUTION RESPIRATORY (INHALATION) at 12:05

## 2021-05-05 RX ADMIN — MEROPENEM AND SODIUM CHLORIDE 1 G: 1 INJECTION, SOLUTION INTRAVENOUS at 04:05

## 2021-05-05 RX ADMIN — MAGNESIUM OXIDE 400 MG (241.3 MG MAGNESIUM) TABLET 400 MG: TABLET at 10:05

## 2021-05-05 RX ADMIN — Medication 10 ML: at 02:05

## 2021-05-05 RX ADMIN — MIDODRINE HYDROCHLORIDE 10 MG: 5 TABLET ORAL at 03:05

## 2021-05-05 RX ADMIN — Medication 10 ML: at 01:05

## 2021-05-05 RX ADMIN — MIDODRINE HYDROCHLORIDE 10 MG: 5 TABLET ORAL at 09:05

## 2021-05-05 RX ADMIN — ENOXAPARIN SODIUM 40 MG: 40 INJECTION SUBCUTANEOUS at 09:05

## 2021-05-08 ENCOUNTER — HOSPITAL ENCOUNTER (INPATIENT)
Facility: HOSPITAL | Age: 61
LOS: 5 days | Discharge: NURSING, REHAB OR LONG-TERM CARE | DRG: 871 | End: 2021-05-14
Attending: EMERGENCY MEDICINE | Admitting: EMERGENCY MEDICINE
Payer: MEDICARE

## 2021-05-08 DIAGNOSIS — R06.02 SOB (SHORTNESS OF BREATH): ICD-10-CM

## 2021-05-08 DIAGNOSIS — J96.21 ACUTE ON CHRONIC RESPIRATORY FAILURE WITH HYPOXIA: Primary | ICD-10-CM

## 2021-05-08 DIAGNOSIS — R09.02 HYPOXIA: ICD-10-CM

## 2021-05-08 DIAGNOSIS — R65.20 SEVERE SEPSIS: ICD-10-CM

## 2021-05-08 DIAGNOSIS — J18.9 PNEUMONIA OF RIGHT LOWER LOBE DUE TO INFECTIOUS ORGANISM: ICD-10-CM

## 2021-05-08 DIAGNOSIS — A41.9 SEPSIS: ICD-10-CM

## 2021-05-08 DIAGNOSIS — A41.9 SEVERE SEPSIS: ICD-10-CM

## 2021-05-08 LAB
ALBUMIN SERPL BCP-MCNC: 2.9 G/DL (ref 3.5–5.2)
ALP SERPL-CCNC: 245 U/L (ref 55–135)
ALT SERPL W/O P-5'-P-CCNC: 66 U/L (ref 10–44)
ANION GAP SERPL CALC-SCNC: 11 MMOL/L (ref 8–16)
AST SERPL-CCNC: 62 U/L (ref 10–40)
BACTERIA #/AREA URNS HPF: ABNORMAL /HPF
BASOPHILS # BLD AUTO: 0.02 K/UL (ref 0–0.2)
BASOPHILS NFR BLD: 0.5 % (ref 0–1.9)
BILIRUB SERPL-MCNC: 0.5 MG/DL (ref 0.1–1)
BILIRUB UR QL STRIP: NEGATIVE
BNP SERPL-MCNC: 148 PG/ML (ref 0–99)
BUN SERPL-MCNC: 16 MG/DL (ref 6–20)
CALCIUM SERPL-MCNC: 9.6 MG/DL (ref 8.7–10.5)
CHLORIDE SERPL-SCNC: 106 MMOL/L (ref 95–110)
CLARITY UR: CLEAR
CO2 SERPL-SCNC: 29 MMOL/L (ref 23–29)
COLOR UR: YELLOW
CREAT SERPL-MCNC: 0.6 MG/DL (ref 0.5–1.4)
DIFFERENTIAL METHOD: ABNORMAL
EOSINOPHIL # BLD AUTO: 0.1 K/UL (ref 0–0.5)
EOSINOPHIL NFR BLD: 2.9 % (ref 0–8)
ERYTHROCYTE [DISTWIDTH] IN BLOOD BY AUTOMATED COUNT: 18.5 % (ref 11.5–14.5)
EST. GFR  (AFRICAN AMERICAN): >60 ML/MIN/1.73 M^2
EST. GFR  (NON AFRICAN AMERICAN): >60 ML/MIN/1.73 M^2
GLUCOSE SERPL-MCNC: 95 MG/DL (ref 70–110)
GLUCOSE UR QL STRIP: NEGATIVE
HCT VFR BLD AUTO: 34.1 % (ref 37–48.5)
HGB BLD-MCNC: 10.3 G/DL (ref 12–16)
HGB UR QL STRIP: NEGATIVE
HYALINE CASTS #/AREA URNS LPF: 1 /LPF
IMM GRANULOCYTES # BLD AUTO: 0.03 K/UL (ref 0–0.04)
IMM GRANULOCYTES NFR BLD AUTO: 0.7 % (ref 0–0.5)
KETONES UR QL STRIP: NEGATIVE
LACTATE SERPL-SCNC: 3.1 MMOL/L (ref 0.5–2.2)
LEUKOCYTE ESTERASE UR QL STRIP: ABNORMAL
LYMPHOCYTES # BLD AUTO: 1 K/UL (ref 1–4.8)
LYMPHOCYTES NFR BLD: 24.3 % (ref 18–48)
MAGNESIUM SERPL-MCNC: 1.9 MG/DL (ref 1.6–2.6)
MCH RBC QN AUTO: 29.5 PG (ref 27–31)
MCHC RBC AUTO-ENTMCNC: 30.2 G/DL (ref 32–36)
MCV RBC AUTO: 98 FL (ref 82–98)
MICROSCOPIC COMMENT: ABNORMAL
MONOCYTES # BLD AUTO: 0.3 K/UL (ref 0.3–1)
MONOCYTES NFR BLD: 7.2 % (ref 4–15)
NEUTROPHILS # BLD AUTO: 2.7 K/UL (ref 1.8–7.7)
NEUTROPHILS NFR BLD: 64.4 % (ref 38–73)
NITRITE UR QL STRIP: NEGATIVE
NRBC BLD-RTO: 0 /100 WBC
PH UR STRIP: 6 [PH] (ref 5–8)
PLATELET # BLD AUTO: 498 K/UL (ref 150–450)
PMV BLD AUTO: 11.1 FL (ref 9.2–12.9)
POTASSIUM SERPL-SCNC: 4.8 MMOL/L (ref 3.5–5.1)
PROT SERPL-MCNC: 8.6 G/DL (ref 6–8.4)
PROT UR QL STRIP: ABNORMAL
RBC # BLD AUTO: 3.49 M/UL (ref 4–5.4)
RBC #/AREA URNS HPF: 2 /HPF (ref 0–4)
SODIUM SERPL-SCNC: 146 MMOL/L (ref 136–145)
SP GR UR STRIP: 1.01 (ref 1–1.03)
SQUAMOUS #/AREA URNS HPF: 4 /HPF
TROPONIN I SERPL DL<=0.01 NG/ML-MCNC: 0.01 NG/ML (ref 0–0.03)
URN SPEC COLLECT METH UR: ABNORMAL
UROBILINOGEN UR STRIP-ACNC: NEGATIVE EU/DL
WBC # BLD AUTO: 4.19 K/UL (ref 3.9–12.7)
WBC #/AREA URNS HPF: 8 /HPF (ref 0–5)
WBC CLUMPS URNS QL MICRO: ABNORMAL

## 2021-05-08 PROCEDURE — 96375 TX/PRO/DX INJ NEW DRUG ADDON: CPT

## 2021-05-08 PROCEDURE — 93010 EKG 12-LEAD: ICD-10-PCS | Mod: ,,, | Performed by: INTERNAL MEDICINE

## 2021-05-08 PROCEDURE — 85610 PROTHROMBIN TIME: CPT | Performed by: EMERGENCY MEDICINE

## 2021-05-08 PROCEDURE — 83690 ASSAY OF LIPASE: CPT | Performed by: EMERGENCY MEDICINE

## 2021-05-08 PROCEDURE — 93005 ELECTROCARDIOGRAM TRACING: CPT

## 2021-05-08 PROCEDURE — 99285 EMERGENCY DEPT VISIT HI MDM: CPT | Mod: 25

## 2021-05-08 PROCEDURE — 63600175 PHARM REV CODE 636 W HCPCS: Performed by: EMERGENCY MEDICINE

## 2021-05-08 PROCEDURE — 81000 URINALYSIS NONAUTO W/SCOPE: CPT | Performed by: EMERGENCY MEDICINE

## 2021-05-08 PROCEDURE — 83880 ASSAY OF NATRIURETIC PEPTIDE: CPT | Performed by: EMERGENCY MEDICINE

## 2021-05-08 PROCEDURE — 87040 BLOOD CULTURE FOR BACTERIA: CPT | Mod: 59 | Performed by: EMERGENCY MEDICINE

## 2021-05-08 PROCEDURE — 85025 COMPLETE CBC W/AUTO DIFF WBC: CPT | Performed by: EMERGENCY MEDICINE

## 2021-05-08 PROCEDURE — 87077 CULTURE AEROBIC IDENTIFY: CPT | Performed by: EMERGENCY MEDICINE

## 2021-05-08 PROCEDURE — 84484 ASSAY OF TROPONIN QUANT: CPT | Performed by: EMERGENCY MEDICINE

## 2021-05-08 PROCEDURE — 83735 ASSAY OF MAGNESIUM: CPT | Performed by: EMERGENCY MEDICINE

## 2021-05-08 PROCEDURE — 25000003 PHARM REV CODE 250: Performed by: EMERGENCY MEDICINE

## 2021-05-08 PROCEDURE — 83605 ASSAY OF LACTIC ACID: CPT | Performed by: EMERGENCY MEDICINE

## 2021-05-08 PROCEDURE — 87186 SC STD MICRODIL/AGAR DIL: CPT | Performed by: EMERGENCY MEDICINE

## 2021-05-08 PROCEDURE — 84145 PROCALCITONIN (PCT): CPT | Performed by: EMERGENCY MEDICINE

## 2021-05-08 PROCEDURE — 93010 ELECTROCARDIOGRAM REPORT: CPT | Mod: ,,, | Performed by: INTERNAL MEDICINE

## 2021-05-08 PROCEDURE — 80053 COMPREHEN METABOLIC PANEL: CPT | Performed by: EMERGENCY MEDICINE

## 2021-05-08 RX ADMIN — VANCOMYCIN HYDROCHLORIDE 1750 MG: 10 INJECTION, POWDER, LYOPHILIZED, FOR SOLUTION INTRAVENOUS at 11:05

## 2021-05-09 PROBLEM — A41.9 SEPSIS: Status: ACTIVE | Noted: 2021-05-09

## 2021-05-09 LAB
C DIFF GDH STL QL: POSITIVE
C DIFF TOX A+B STL QL IA: POSITIVE
CTP QC/QA: YES
INR PPP: 1.1 (ref 0.8–1.2)
LACTATE SERPL-SCNC: 0.7 MMOL/L (ref 0.5–2.2)
LACTATE SERPL-SCNC: 5.1 MMOL/L (ref 0.5–2.2)
LIPASE SERPL-CCNC: 16 U/L (ref 4–60)
PROCALCITONIN SERPL IA-MCNC: 0.1 NG/ML
PROTHROMBIN TIME: 11.4 SEC (ref 9–12.5)
SARS-COV-2 RDRP RESP QL NAA+PROBE: NEGATIVE

## 2021-05-09 PROCEDURE — 99900035 HC TECH TIME PER 15 MIN (STAT)

## 2021-05-09 PROCEDURE — 63600175 PHARM REV CODE 636 W HCPCS: Performed by: INTERNAL MEDICINE

## 2021-05-09 PROCEDURE — 94002 VENT MGMT INPAT INIT DAY: CPT

## 2021-05-09 PROCEDURE — 25000242 PHARM REV CODE 250 ALT 637 W/ HCPCS: Performed by: HOSPITALIST

## 2021-05-09 PROCEDURE — 94640 AIRWAY INHALATION TREATMENT: CPT

## 2021-05-09 PROCEDURE — 87077 CULTURE AEROBIC IDENTIFY: CPT | Performed by: EMERGENCY MEDICINE

## 2021-05-09 PROCEDURE — C9113 INJ PANTOPRAZOLE SODIUM, VIA: HCPCS | Performed by: HOSPITALIST

## 2021-05-09 PROCEDURE — 83605 ASSAY OF LACTIC ACID: CPT | Performed by: EMERGENCY MEDICINE

## 2021-05-09 PROCEDURE — 87449 NOS EACH ORGANISM AG IA: CPT | Performed by: EMERGENCY MEDICINE

## 2021-05-09 PROCEDURE — 36415 COLL VENOUS BLD VENIPUNCTURE: CPT | Performed by: INTERNAL MEDICINE

## 2021-05-09 PROCEDURE — 25000003 PHARM REV CODE 250: Performed by: HOSPITALIST

## 2021-05-09 PROCEDURE — U0002 COVID-19 LAB TEST NON-CDC: HCPCS | Performed by: EMERGENCY MEDICINE

## 2021-05-09 PROCEDURE — C1751 CATH, INF, PER/CENT/MIDLINE: HCPCS

## 2021-05-09 PROCEDURE — 87324 CLOSTRIDIUM AG IA: CPT | Performed by: EMERGENCY MEDICINE

## 2021-05-09 PROCEDURE — 99900026 HC AIRWAY MAINTENANCE (STAT)

## 2021-05-09 PROCEDURE — 20000000 HC ICU ROOM

## 2021-05-09 PROCEDURE — A4216 STERILE WATER/SALINE, 10 ML: HCPCS | Performed by: HOSPITALIST

## 2021-05-09 PROCEDURE — 94668 MNPJ CHEST WALL SBSQ: CPT

## 2021-05-09 PROCEDURE — 94761 N-INVAS EAR/PLS OXIMETRY MLT: CPT

## 2021-05-09 PROCEDURE — 25000003 PHARM REV CODE 250: Performed by: INTERNAL MEDICINE

## 2021-05-09 PROCEDURE — 87427 SHIGA-LIKE TOXIN AG IA: CPT | Performed by: EMERGENCY MEDICINE

## 2021-05-09 PROCEDURE — 36569 INSJ PICC 5 YR+ W/O IMAGING: CPT

## 2021-05-09 PROCEDURE — 63600175 PHARM REV CODE 636 W HCPCS: Performed by: EMERGENCY MEDICINE

## 2021-05-09 PROCEDURE — 25000242 PHARM REV CODE 250 ALT 637 W/ HCPCS: Performed by: INTERNAL MEDICINE

## 2021-05-09 PROCEDURE — 63600175 PHARM REV CODE 636 W HCPCS: Performed by: HOSPITALIST

## 2021-05-09 PROCEDURE — 27000221 HC OXYGEN, UP TO 24 HOURS

## 2021-05-09 PROCEDURE — 87045 FECES CULTURE AEROBIC BACT: CPT | Performed by: EMERGENCY MEDICINE

## 2021-05-09 PROCEDURE — 83605 ASSAY OF LACTIC ACID: CPT | Mod: 91 | Performed by: INTERNAL MEDICINE

## 2021-05-09 PROCEDURE — 87046 STOOL CULTR AEROBIC BACT EA: CPT | Performed by: EMERGENCY MEDICINE

## 2021-05-09 PROCEDURE — 87186 SC STD MICRODIL/AGAR DIL: CPT | Performed by: EMERGENCY MEDICINE

## 2021-05-09 PROCEDURE — 96365 THER/PROPH/DIAG IV INF INIT: CPT

## 2021-05-09 PROCEDURE — 25000003 PHARM REV CODE 250: Performed by: EMERGENCY MEDICINE

## 2021-05-09 PROCEDURE — 94667 MNPJ CHEST WALL 1ST: CPT

## 2021-05-09 PROCEDURE — 25500020 PHARM REV CODE 255: Performed by: EMERGENCY MEDICINE

## 2021-05-09 RX ORDER — MEROPENEM AND SODIUM CHLORIDE 1 G/50ML
1 INJECTION, SOLUTION INTRAVENOUS
Status: DISCONTINUED | OUTPATIENT
Start: 2021-05-09 | End: 2021-05-12

## 2021-05-09 RX ORDER — NOREPINEPHRINE BITARTRATE/D5W 4MG/250ML
0-3 PLASTIC BAG, INJECTION (ML) INTRAVENOUS CONTINUOUS
Status: DISCONTINUED | OUTPATIENT
Start: 2021-05-09 | End: 2021-05-12

## 2021-05-09 RX ORDER — ONDANSETRON 2 MG/ML
4 INJECTION INTRAMUSCULAR; INTRAVENOUS EVERY 8 HOURS PRN
Status: DISCONTINUED | OUTPATIENT
Start: 2021-05-09 | End: 2021-05-14 | Stop reason: HOSPADM

## 2021-05-09 RX ORDER — POLYETHYLENE GLYCOL 3350 17 G/17G
17 POWDER, FOR SOLUTION ORAL 2 TIMES DAILY PRN
Status: DISCONTINUED | OUTPATIENT
Start: 2021-05-09 | End: 2021-05-14 | Stop reason: HOSPADM

## 2021-05-09 RX ORDER — ACETYLCYSTEINE 100 MG/ML
4 SOLUTION ORAL; RESPIRATORY (INHALATION) EVERY 8 HOURS
Status: DISCONTINUED | OUTPATIENT
Start: 2021-05-09 | End: 2021-05-14 | Stop reason: HOSPADM

## 2021-05-09 RX ORDER — ACETYLCYSTEINE 100 MG/ML
4 SOLUTION ORAL; RESPIRATORY (INHALATION) 4 TIMES DAILY
Status: DISCONTINUED | OUTPATIENT
Start: 2021-05-09 | End: 2021-05-09

## 2021-05-09 RX ORDER — ACETAMINOPHEN 325 MG/1
650 TABLET ORAL EVERY 4 HOURS PRN
Status: DISCONTINUED | OUTPATIENT
Start: 2021-05-09 | End: 2021-05-14 | Stop reason: HOSPADM

## 2021-05-09 RX ORDER — NOREPINEPHRINE BITARTRATE/D5W 4MG/250ML
0-3 PLASTIC BAG, INJECTION (ML) INTRAVENOUS CONTINUOUS
Status: DISCONTINUED | OUTPATIENT
Start: 2021-05-09 | End: 2021-05-09

## 2021-05-09 RX ORDER — SODIUM CHLORIDE 0.9 % (FLUSH) 0.9 %
10 SYRINGE (ML) INJECTION
Status: DISCONTINUED | OUTPATIENT
Start: 2021-05-09 | End: 2021-05-10

## 2021-05-09 RX ORDER — SODIUM CHLORIDE, SODIUM LACTATE, POTASSIUM CHLORIDE, CALCIUM CHLORIDE 600; 310; 30; 20 MG/100ML; MG/100ML; MG/100ML; MG/100ML
1000 INJECTION, SOLUTION INTRAVENOUS
Status: COMPLETED | OUTPATIENT
Start: 2021-05-09 | End: 2021-05-09

## 2021-05-09 RX ORDER — SCOLOPAMINE TRANSDERMAL SYSTEM 1 MG/1
1 PATCH, EXTENDED RELEASE TRANSDERMAL
Status: DISCONTINUED | OUTPATIENT
Start: 2021-05-09 | End: 2021-05-09

## 2021-05-09 RX ORDER — PANTOPRAZOLE SODIUM 40 MG/10ML
40 INJECTION, POWDER, LYOPHILIZED, FOR SOLUTION INTRAVENOUS DAILY
Status: DISCONTINUED | OUTPATIENT
Start: 2021-05-09 | End: 2021-05-14 | Stop reason: HOSPADM

## 2021-05-09 RX ORDER — ENOXAPARIN SODIUM 100 MG/ML
40 INJECTION SUBCUTANEOUS EVERY 24 HOURS
Status: DISCONTINUED | OUTPATIENT
Start: 2021-05-09 | End: 2021-05-14 | Stop reason: HOSPADM

## 2021-05-09 RX ORDER — SODIUM CHLORIDE 0.9 % (FLUSH) 0.9 %
10 SYRINGE (ML) INJECTION EVERY 6 HOURS
Status: DISCONTINUED | OUTPATIENT
Start: 2021-05-09 | End: 2021-05-14 | Stop reason: HOSPADM

## 2021-05-09 RX ORDER — SODIUM CHLORIDE 0.9 % (FLUSH) 0.9 %
10 SYRINGE (ML) INJECTION
Status: DISCONTINUED | OUTPATIENT
Start: 2021-05-09 | End: 2021-05-09

## 2021-05-09 RX ORDER — MIDODRINE HYDROCHLORIDE 5 MG/1
10 TABLET ORAL 3 TIMES DAILY
Status: DISCONTINUED | OUTPATIENT
Start: 2021-05-09 | End: 2021-05-14 | Stop reason: HOSPADM

## 2021-05-09 RX ORDER — IPRATROPIUM BROMIDE AND ALBUTEROL SULFATE 2.5; .5 MG/3ML; MG/3ML
3 SOLUTION RESPIRATORY (INHALATION) EVERY 4 HOURS
Status: DISCONTINUED | OUTPATIENT
Start: 2021-05-09 | End: 2021-05-09

## 2021-05-09 RX ORDER — MORPHINE SULFATE 4 MG/ML
2 INJECTION, SOLUTION INTRAMUSCULAR; INTRAVENOUS EVERY 4 HOURS PRN
Status: DISCONTINUED | OUTPATIENT
Start: 2021-05-09 | End: 2021-05-14 | Stop reason: HOSPADM

## 2021-05-09 RX ORDER — VANCOMYCIN HCL IN 5 % DEXTROSE 1G/250ML
1000 PLASTIC BAG, INJECTION (ML) INTRAVENOUS
Status: DISCONTINUED | OUTPATIENT
Start: 2021-05-09 | End: 2021-05-10

## 2021-05-09 RX ORDER — IPRATROPIUM BROMIDE AND ALBUTEROL SULFATE 2.5; .5 MG/3ML; MG/3ML
3 SOLUTION RESPIRATORY (INHALATION) EVERY 8 HOURS
Status: DISCONTINUED | OUTPATIENT
Start: 2021-05-09 | End: 2021-05-14 | Stop reason: HOSPADM

## 2021-05-09 RX ORDER — SODIUM CHLORIDE 0.9 % (FLUSH) 0.9 %
10 SYRINGE (ML) INJECTION
Status: DISCONTINUED | OUTPATIENT
Start: 2021-05-09 | End: 2021-05-14 | Stop reason: HOSPADM

## 2021-05-09 RX ADMIN — MIDODRINE HYDROCHLORIDE 10 MG: 5 TABLET ORAL at 02:05

## 2021-05-09 RX ADMIN — IPRATROPIUM BROMIDE AND ALBUTEROL SULFATE 3 ML: .5; 3 SOLUTION RESPIRATORY (INHALATION) at 10:05

## 2021-05-09 RX ADMIN — MIDODRINE HYDROCHLORIDE 10 MG: 5 TABLET ORAL at 08:05

## 2021-05-09 RX ADMIN — PANTOPRAZOLE SODIUM 40 MG: 40 INJECTION, POWDER, FOR SOLUTION INTRAVENOUS at 08:05

## 2021-05-09 RX ADMIN — ACETYLCYSTEINE 4 ML: 100 INHALANT RESPIRATORY (INHALATION) at 04:05

## 2021-05-09 RX ADMIN — SODIUM CHLORIDE, SODIUM LACTATE, POTASSIUM CHLORIDE, AND CALCIUM CHLORIDE 500 ML: .6; .31; .03; .02 INJECTION, SOLUTION INTRAVENOUS at 02:05

## 2021-05-09 RX ADMIN — Medication 10 ML: at 05:05

## 2021-05-09 RX ADMIN — MEROPENEM 2 G: 1 INJECTION, POWDER, FOR SOLUTION INTRAVENOUS at 01:05

## 2021-05-09 RX ADMIN — MEROPENEM AND SODIUM CHLORIDE 1 G: 1 INJECTION, SOLUTION INTRAVENOUS at 05:05

## 2021-05-09 RX ADMIN — VANCOMYCIN HYDROCHLORIDE 250 MG: KIT at 05:05

## 2021-05-09 RX ADMIN — IPRATROPIUM BROMIDE AND ALBUTEROL SULFATE 3 ML: .5; 3 SOLUTION RESPIRATORY (INHALATION) at 04:05

## 2021-05-09 RX ADMIN — VANCOMYCIN HYDROCHLORIDE 1000 MG: 1 INJECTION, POWDER, LYOPHILIZED, FOR SOLUTION INTRAVENOUS at 11:05

## 2021-05-09 RX ADMIN — ACETYLCYSTEINE 4 ML: 100 INHALANT RESPIRATORY (INHALATION) at 10:05

## 2021-05-09 RX ADMIN — MIDODRINE HYDROCHLORIDE 10 MG: 5 TABLET ORAL at 09:05

## 2021-05-09 RX ADMIN — SODIUM CHLORIDE, SODIUM LACTATE, POTASSIUM CHLORIDE, AND CALCIUM CHLORIDE 2040 ML: .6; .31; .03; .02 INJECTION, SOLUTION INTRAVENOUS at 12:05

## 2021-05-09 RX ADMIN — ENOXAPARIN SODIUM 40 MG: 40 INJECTION SUBCUTANEOUS at 05:05

## 2021-05-09 RX ADMIN — Medication 0.02 MCG/KG/MIN: at 02:05

## 2021-05-09 RX ADMIN — MEROPENEM AND SODIUM CHLORIDE 1 G: 1 INJECTION, SOLUTION INTRAVENOUS at 08:05

## 2021-05-09 RX ADMIN — VANCOMYCIN HYDROCHLORIDE 1000 MG: 1 INJECTION, POWDER, LYOPHILIZED, FOR SOLUTION INTRAVENOUS at 10:05

## 2021-05-09 RX ADMIN — IPRATROPIUM BROMIDE AND ALBUTEROL SULFATE 3 ML: .5; 3 SOLUTION RESPIRATORY (INHALATION) at 05:05

## 2021-05-09 RX ADMIN — SODIUM CHLORIDE, SODIUM LACTATE, POTASSIUM CHLORIDE, AND CALCIUM CHLORIDE 1000 ML: .6; .31; .03; .02 INJECTION, SOLUTION INTRAVENOUS at 08:05

## 2021-05-09 RX ADMIN — SODIUM CHLORIDE, SODIUM LACTATE, POTASSIUM CHLORIDE, AND CALCIUM CHLORIDE 1000 ML: .6; .31; .03; .02 INJECTION, SOLUTION INTRAVENOUS at 03:05

## 2021-05-09 RX ADMIN — IOHEXOL 100 ML: 350 INJECTION, SOLUTION INTRAVENOUS at 02:05

## 2021-05-10 PROBLEM — J96.21 ACUTE ON CHRONIC RESPIRATORY FAILURE WITH HYPOXIA: Status: ACTIVE | Noted: 2021-04-22

## 2021-05-10 PROBLEM — A04.72 COLITIS DUE TO CLOSTRIDIUM DIFFICILE: Status: ACTIVE | Noted: 2021-05-10

## 2021-05-10 PROBLEM — J96.11 CHRONIC RESPIRATORY FAILURE WITH HYPOXIA: Status: ACTIVE | Noted: 2021-05-10

## 2021-05-10 PROBLEM — R78.81 BACTEREMIA DUE TO GRAM-POSITIVE BACTERIA: Status: ACTIVE | Noted: 2021-05-10

## 2021-05-10 LAB
ALBUMIN SERPL BCP-MCNC: 2.7 G/DL (ref 3.5–5.2)
ALP SERPL-CCNC: 204 U/L (ref 55–135)
ALT SERPL W/O P-5'-P-CCNC: 60 U/L (ref 10–44)
ANION GAP SERPL CALC-SCNC: 6 MMOL/L (ref 8–16)
AST SERPL-CCNC: 40 U/L (ref 10–40)
BASOPHILS # BLD AUTO: 0.04 K/UL (ref 0–0.2)
BASOPHILS NFR BLD: 0.8 % (ref 0–1.9)
BILIRUB SERPL-MCNC: 0.6 MG/DL (ref 0.1–1)
BUN SERPL-MCNC: 7 MG/DL (ref 6–20)
CALCIUM SERPL-MCNC: 9.1 MG/DL (ref 8.7–10.5)
CHLORIDE SERPL-SCNC: 106 MMOL/L (ref 95–110)
CO2 SERPL-SCNC: 31 MMOL/L (ref 23–29)
CREAT SERPL-MCNC: 0.5 MG/DL (ref 0.5–1.4)
DIFFERENTIAL METHOD: ABNORMAL
E COLI SXT1 STL QL IA: NEGATIVE
E COLI SXT2 STL QL IA: NEGATIVE
EOSINOPHIL # BLD AUTO: 0.3 K/UL (ref 0–0.5)
EOSINOPHIL NFR BLD: 5.1 % (ref 0–8)
ERYTHROCYTE [DISTWIDTH] IN BLOOD BY AUTOMATED COUNT: 18.6 % (ref 11.5–14.5)
EST. GFR  (AFRICAN AMERICAN): >60 ML/MIN/1.73 M^2
EST. GFR  (NON AFRICAN AMERICAN): >60 ML/MIN/1.73 M^2
GLUCOSE SERPL-MCNC: 96 MG/DL (ref 70–110)
HCT VFR BLD AUTO: 30.9 % (ref 37–48.5)
HGB BLD-MCNC: 9.6 G/DL (ref 12–16)
IMM GRANULOCYTES # BLD AUTO: 0.01 K/UL (ref 0–0.04)
IMM GRANULOCYTES NFR BLD AUTO: 0.2 % (ref 0–0.5)
LYMPHOCYTES # BLD AUTO: 1.7 K/UL (ref 1–4.8)
LYMPHOCYTES NFR BLD: 34.6 % (ref 18–48)
MCH RBC QN AUTO: 29.4 PG (ref 27–31)
MCHC RBC AUTO-ENTMCNC: 31.1 G/DL (ref 32–36)
MCV RBC AUTO: 95 FL (ref 82–98)
MONOCYTES # BLD AUTO: 0.7 K/UL (ref 0.3–1)
MONOCYTES NFR BLD: 13.3 % (ref 4–15)
NEUTROPHILS # BLD AUTO: 2.2 K/UL (ref 1.8–7.7)
NEUTROPHILS NFR BLD: 46 % (ref 38–73)
NRBC BLD-RTO: 0 /100 WBC
PLATELET # BLD AUTO: 428 K/UL (ref 150–450)
PMV BLD AUTO: 10.8 FL (ref 9.2–12.9)
POTASSIUM SERPL-SCNC: 3.9 MMOL/L (ref 3.5–5.1)
PROT SERPL-MCNC: 7.5 G/DL (ref 6–8.4)
RBC # BLD AUTO: 3.26 M/UL (ref 4–5.4)
SODIUM SERPL-SCNC: 143 MMOL/L (ref 136–145)
VANCOMYCIN TROUGH SERPL-MCNC: 23.2 UG/ML (ref 10–22)
WBC # BLD AUTO: 4.88 K/UL (ref 3.9–12.7)

## 2021-05-10 PROCEDURE — 25000003 PHARM REV CODE 250: Performed by: HOSPITALIST

## 2021-05-10 PROCEDURE — 99900026 HC AIRWAY MAINTENANCE (STAT)

## 2021-05-10 PROCEDURE — 94668 MNPJ CHEST WALL SBSQ: CPT

## 2021-05-10 PROCEDURE — A4216 STERILE WATER/SALINE, 10 ML: HCPCS | Performed by: HOSPITALIST

## 2021-05-10 PROCEDURE — 20000000 HC ICU ROOM

## 2021-05-10 PROCEDURE — 25000003 PHARM REV CODE 250: Performed by: INTERNAL MEDICINE

## 2021-05-10 PROCEDURE — 80202 ASSAY OF VANCOMYCIN: CPT | Performed by: EMERGENCY MEDICINE

## 2021-05-10 PROCEDURE — 80053 COMPREHEN METABOLIC PANEL: CPT | Performed by: INTERNAL MEDICINE

## 2021-05-10 PROCEDURE — 85025 COMPLETE CBC W/AUTO DIFF WBC: CPT | Performed by: INTERNAL MEDICINE

## 2021-05-10 PROCEDURE — 87040 BLOOD CULTURE FOR BACTERIA: CPT | Mod: 59 | Performed by: HOSPITALIST

## 2021-05-10 PROCEDURE — 25000242 PHARM REV CODE 250 ALT 637 W/ HCPCS: Performed by: HOSPITALIST

## 2021-05-10 PROCEDURE — 63600175 PHARM REV CODE 636 W HCPCS: Performed by: INTERNAL MEDICINE

## 2021-05-10 PROCEDURE — 94640 AIRWAY INHALATION TREATMENT: CPT

## 2021-05-10 PROCEDURE — 63600175 PHARM REV CODE 636 W HCPCS: Performed by: HOSPITALIST

## 2021-05-10 PROCEDURE — 99223 PR INITIAL HOSPITAL CARE,LEVL III: ICD-10-PCS | Mod: ,,, | Performed by: INTERNAL MEDICINE

## 2021-05-10 PROCEDURE — 27000221 HC OXYGEN, UP TO 24 HOURS

## 2021-05-10 PROCEDURE — C9113 INJ PANTOPRAZOLE SODIUM, VIA: HCPCS | Performed by: HOSPITALIST

## 2021-05-10 PROCEDURE — 99223 1ST HOSP IP/OBS HIGH 75: CPT | Mod: ,,, | Performed by: INTERNAL MEDICINE

## 2021-05-10 PROCEDURE — 36415 COLL VENOUS BLD VENIPUNCTURE: CPT | Performed by: EMERGENCY MEDICINE

## 2021-05-10 RX ORDER — MUPIROCIN 20 MG/G
OINTMENT TOPICAL 2 TIMES DAILY
Status: DISCONTINUED | OUTPATIENT
Start: 2021-05-10 | End: 2021-05-14 | Stop reason: HOSPADM

## 2021-05-10 RX ADMIN — MEROPENEM AND SODIUM CHLORIDE 1 G: 1 INJECTION, SOLUTION INTRAVENOUS at 01:05

## 2021-05-10 RX ADMIN — MIDODRINE HYDROCHLORIDE 10 MG: 5 TABLET ORAL at 09:05

## 2021-05-10 RX ADMIN — IPRATROPIUM BROMIDE AND ALBUTEROL SULFATE 3 ML: .5; 3 SOLUTION RESPIRATORY (INHALATION) at 03:05

## 2021-05-10 RX ADMIN — Medication 10 ML: at 11:05

## 2021-05-10 RX ADMIN — VANCOMYCIN HYDROCHLORIDE 250 MG: KIT at 11:05

## 2021-05-10 RX ADMIN — Medication 10 ML: at 05:05

## 2021-05-10 RX ADMIN — MEROPENEM AND SODIUM CHLORIDE 1 G: 1 INJECTION, SOLUTION INTRAVENOUS at 09:05

## 2021-05-10 RX ADMIN — VANCOMYCIN HYDROCHLORIDE 250 MG: KIT at 12:05

## 2021-05-10 RX ADMIN — VANCOMYCIN HYDROCHLORIDE 750 MG: 750 INJECTION, POWDER, LYOPHILIZED, FOR SOLUTION INTRAVENOUS at 11:05

## 2021-05-10 RX ADMIN — PANTOPRAZOLE SODIUM 40 MG: 40 INJECTION, POWDER, FOR SOLUTION INTRAVENOUS at 09:05

## 2021-05-10 RX ADMIN — ACETYLCYSTEINE 4 ML: 100 INHALANT RESPIRATORY (INHALATION) at 03:05

## 2021-05-10 RX ADMIN — ACETYLCYSTEINE 4 ML: 100 INHALANT RESPIRATORY (INHALATION) at 12:05

## 2021-05-10 RX ADMIN — VANCOMYCIN HYDROCHLORIDE 250 MG: KIT at 06:05

## 2021-05-10 RX ADMIN — MUPIROCIN: 20 OINTMENT TOPICAL at 09:05

## 2021-05-10 RX ADMIN — ACETYLCYSTEINE 4 ML: 100 INHALANT RESPIRATORY (INHALATION) at 07:05

## 2021-05-10 RX ADMIN — MUPIROCIN: 20 OINTMENT TOPICAL at 11:05

## 2021-05-10 RX ADMIN — ENOXAPARIN SODIUM 40 MG: 40 INJECTION SUBCUTANEOUS at 05:05

## 2021-05-10 RX ADMIN — IPRATROPIUM BROMIDE AND ALBUTEROL SULFATE 3 ML: .5; 3 SOLUTION RESPIRATORY (INHALATION) at 07:05

## 2021-05-10 RX ADMIN — MIDODRINE HYDROCHLORIDE 10 MG: 5 TABLET ORAL at 03:05

## 2021-05-10 RX ADMIN — VANCOMYCIN HYDROCHLORIDE 250 MG: KIT at 05:05

## 2021-05-10 RX ADMIN — IPRATROPIUM BROMIDE AND ALBUTEROL SULFATE 3 ML: .5; 3 SOLUTION RESPIRATORY (INHALATION) at 12:05

## 2021-05-10 RX ADMIN — Medication 10 ML: at 12:05

## 2021-05-10 RX ADMIN — MORPHINE SULFATE 2 MG: 4 INJECTION, SOLUTION INTRAMUSCULAR; INTRAVENOUS at 09:05

## 2021-05-10 RX ADMIN — MEROPENEM AND SODIUM CHLORIDE 1 G: 1 INJECTION, SOLUTION INTRAVENOUS at 05:05

## 2021-05-11 PROBLEM — N30.00 ACUTE CYSTITIS: Status: RESOLVED | Noted: 2021-04-22 | Resolved: 2021-05-11

## 2021-05-11 PROBLEM — J96.11 CHRONIC RESPIRATORY FAILURE WITH HYPOXIA: Status: RESOLVED | Noted: 2021-05-10 | Resolved: 2021-05-11

## 2021-05-11 PROBLEM — E44.0 MALNUTRITION OF MODERATE DEGREE: Status: ACTIVE | Noted: 2021-05-11

## 2021-05-11 PROBLEM — Z74.01 BEDRIDDEN: Status: RESOLVED | Noted: 2021-05-01 | Resolved: 2021-05-11

## 2021-05-11 PROBLEM — B95.2 BACTEREMIA DUE TO ENTEROCOCCUS: Status: ACTIVE | Noted: 2021-05-10

## 2021-05-11 LAB
ALBUMIN SERPL BCP-MCNC: 2.5 G/DL (ref 3.5–5.2)
ALP SERPL-CCNC: 170 U/L (ref 55–135)
ALT SERPL W/O P-5'-P-CCNC: 48 U/L (ref 10–44)
ANION GAP SERPL CALC-SCNC: 6 MMOL/L (ref 8–16)
AST SERPL-CCNC: 32 U/L (ref 10–40)
BACTERIA BLD CULT: ABNORMAL
BASOPHILS # BLD AUTO: 0.04 K/UL (ref 0–0.2)
BASOPHILS NFR BLD: 0.8 % (ref 0–1.9)
BILIRUB SERPL-MCNC: 0.6 MG/DL (ref 0.1–1)
BUN SERPL-MCNC: 8 MG/DL (ref 6–20)
CALCIUM SERPL-MCNC: 8.8 MG/DL (ref 8.7–10.5)
CHLORIDE SERPL-SCNC: 104 MMOL/L (ref 95–110)
CO2 SERPL-SCNC: 31 MMOL/L (ref 23–29)
CREAT SERPL-MCNC: 0.5 MG/DL (ref 0.5–1.4)
DIFFERENTIAL METHOD: ABNORMAL
EOSINOPHIL # BLD AUTO: 0.2 K/UL (ref 0–0.5)
EOSINOPHIL NFR BLD: 4.1 % (ref 0–8)
ERYTHROCYTE [DISTWIDTH] IN BLOOD BY AUTOMATED COUNT: 18.6 % (ref 11.5–14.5)
EST. GFR  (AFRICAN AMERICAN): >60 ML/MIN/1.73 M^2
EST. GFR  (NON AFRICAN AMERICAN): >60 ML/MIN/1.73 M^2
GLUCOSE SERPL-MCNC: 71 MG/DL (ref 70–110)
HCT VFR BLD AUTO: 27.6 % (ref 37–48.5)
HGB BLD-MCNC: 8.4 G/DL (ref 12–16)
IMM GRANULOCYTES # BLD AUTO: 0.01 K/UL (ref 0–0.04)
IMM GRANULOCYTES NFR BLD AUTO: 0.2 % (ref 0–0.5)
LYMPHOCYTES # BLD AUTO: 1.7 K/UL (ref 1–4.8)
LYMPHOCYTES NFR BLD: 32.4 % (ref 18–48)
MCH RBC QN AUTO: 28.7 PG (ref 27–31)
MCHC RBC AUTO-ENTMCNC: 30.4 G/DL (ref 32–36)
MCV RBC AUTO: 94 FL (ref 82–98)
MONOCYTES # BLD AUTO: 0.7 K/UL (ref 0.3–1)
MONOCYTES NFR BLD: 13.9 % (ref 4–15)
NEUTROPHILS # BLD AUTO: 2.5 K/UL (ref 1.8–7.7)
NEUTROPHILS NFR BLD: 48.6 % (ref 38–73)
NRBC BLD-RTO: 0 /100 WBC
PLATELET # BLD AUTO: 374 K/UL (ref 150–450)
PMV BLD AUTO: 10.9 FL (ref 9.2–12.9)
POCT GLUCOSE: 103 MG/DL (ref 70–110)
POCT GLUCOSE: 106 MG/DL (ref 70–110)
POCT GLUCOSE: 82 MG/DL (ref 70–110)
POCT GLUCOSE: 91 MG/DL (ref 70–110)
POTASSIUM SERPL-SCNC: 3.6 MMOL/L (ref 3.5–5.1)
PROT SERPL-MCNC: 6.8 G/DL (ref 6–8.4)
RBC # BLD AUTO: 2.93 M/UL (ref 4–5.4)
SODIUM SERPL-SCNC: 141 MMOL/L (ref 136–145)
WBC # BLD AUTO: 5.09 K/UL (ref 3.9–12.7)

## 2021-05-11 PROCEDURE — 25000242 PHARM REV CODE 250 ALT 637 W/ HCPCS: Performed by: HOSPITALIST

## 2021-05-11 PROCEDURE — 27000221 HC OXYGEN, UP TO 24 HOURS

## 2021-05-11 PROCEDURE — 99223 PR INITIAL HOSPITAL CARE,LEVL III: ICD-10-PCS | Mod: ,,, | Performed by: INTERNAL MEDICINE

## 2021-05-11 PROCEDURE — 63600175 PHARM REV CODE 636 W HCPCS: Performed by: HOSPITALIST

## 2021-05-11 PROCEDURE — 99233 SBSQ HOSP IP/OBS HIGH 50: CPT | Mod: ,,, | Performed by: INTERNAL MEDICINE

## 2021-05-11 PROCEDURE — 94761 N-INVAS EAR/PLS OXIMETRY MLT: CPT

## 2021-05-11 PROCEDURE — C9113 INJ PANTOPRAZOLE SODIUM, VIA: HCPCS | Performed by: HOSPITALIST

## 2021-05-11 PROCEDURE — 63600175 PHARM REV CODE 636 W HCPCS: Performed by: INTERNAL MEDICINE

## 2021-05-11 PROCEDURE — 99233 PR SUBSEQUENT HOSPITAL CARE,LEVL III: ICD-10-PCS | Mod: ,,, | Performed by: INTERNAL MEDICINE

## 2021-05-11 PROCEDURE — 99900026 HC AIRWAY MAINTENANCE (STAT)

## 2021-05-11 PROCEDURE — 99497 ADVNCD CARE PLAN 30 MIN: CPT | Mod: ,,, | Performed by: INTERNAL MEDICINE

## 2021-05-11 PROCEDURE — 25000003 PHARM REV CODE 250: Performed by: INTERNAL MEDICINE

## 2021-05-11 PROCEDURE — 25000003 PHARM REV CODE 250: Performed by: HOSPITALIST

## 2021-05-11 PROCEDURE — 99497 PR ADVNCD CARE PLAN 30 MIN: ICD-10-PCS | Mod: ,,, | Performed by: INTERNAL MEDICINE

## 2021-05-11 PROCEDURE — 36415 COLL VENOUS BLD VENIPUNCTURE: CPT | Performed by: INTERNAL MEDICINE

## 2021-05-11 PROCEDURE — 94668 MNPJ CHEST WALL SBSQ: CPT

## 2021-05-11 PROCEDURE — 20000000 HC ICU ROOM

## 2021-05-11 PROCEDURE — 94640 AIRWAY INHALATION TREATMENT: CPT

## 2021-05-11 PROCEDURE — 99223 1ST HOSP IP/OBS HIGH 75: CPT | Mod: ,,, | Performed by: INTERNAL MEDICINE

## 2021-05-11 PROCEDURE — 80053 COMPREHEN METABOLIC PANEL: CPT | Performed by: INTERNAL MEDICINE

## 2021-05-11 PROCEDURE — 85025 COMPLETE CBC W/AUTO DIFF WBC: CPT | Performed by: INTERNAL MEDICINE

## 2021-05-11 PROCEDURE — A4216 STERILE WATER/SALINE, 10 ML: HCPCS | Performed by: HOSPITALIST

## 2021-05-11 RX ADMIN — MORPHINE SULFATE 2 MG: 4 INJECTION, SOLUTION INTRAMUSCULAR; INTRAVENOUS at 04:05

## 2021-05-11 RX ADMIN — ACETYLCYSTEINE 4 ML: 100 INHALANT RESPIRATORY (INHALATION) at 02:05

## 2021-05-11 RX ADMIN — VANCOMYCIN HYDROCHLORIDE 250 MG: KIT at 06:05

## 2021-05-11 RX ADMIN — ACETYLCYSTEINE 4 ML: 100 INHALANT RESPIRATORY (INHALATION) at 11:05

## 2021-05-11 RX ADMIN — MIDODRINE HYDROCHLORIDE 10 MG: 5 TABLET ORAL at 09:05

## 2021-05-11 RX ADMIN — ACETYLCYSTEINE 4 ML: 100 INHALANT RESPIRATORY (INHALATION) at 01:05

## 2021-05-11 RX ADMIN — MEROPENEM AND SODIUM CHLORIDE 1 G: 1 INJECTION, SOLUTION INTRAVENOUS at 06:05

## 2021-05-11 RX ADMIN — Medication 10 ML: at 12:05

## 2021-05-11 RX ADMIN — MUPIROCIN: 20 OINTMENT TOPICAL at 09:05

## 2021-05-11 RX ADMIN — Medication 10 ML: at 06:05

## 2021-05-11 RX ADMIN — IPRATROPIUM BROMIDE AND ALBUTEROL SULFATE 3 ML: .5; 3 SOLUTION RESPIRATORY (INHALATION) at 02:05

## 2021-05-11 RX ADMIN — MEROPENEM AND SODIUM CHLORIDE 1 G: 1 INJECTION, SOLUTION INTRAVENOUS at 12:05

## 2021-05-11 RX ADMIN — IPRATROPIUM BROMIDE AND ALBUTEROL SULFATE 3 ML: .5; 3 SOLUTION RESPIRATORY (INHALATION) at 08:05

## 2021-05-11 RX ADMIN — ENOXAPARIN SODIUM 40 MG: 40 INJECTION SUBCUTANEOUS at 06:05

## 2021-05-11 RX ADMIN — VANCOMYCIN HYDROCHLORIDE 750 MG: 750 INJECTION, POWDER, LYOPHILIZED, FOR SOLUTION INTRAVENOUS at 11:05

## 2021-05-11 RX ADMIN — IPRATROPIUM BROMIDE AND ALBUTEROL SULFATE 3 ML: .5; 3 SOLUTION RESPIRATORY (INHALATION) at 11:05

## 2021-05-11 RX ADMIN — VANCOMYCIN HYDROCHLORIDE 250 MG: KIT at 11:05

## 2021-05-11 RX ADMIN — MEROPENEM AND SODIUM CHLORIDE 1 G: 1 INJECTION, SOLUTION INTRAVENOUS at 09:05

## 2021-05-11 RX ADMIN — ACETYLCYSTEINE 4 ML: 100 INHALANT RESPIRATORY (INHALATION) at 08:05

## 2021-05-11 RX ADMIN — PANTOPRAZOLE SODIUM 40 MG: 40 INJECTION, POWDER, FOR SOLUTION INTRAVENOUS at 09:05

## 2021-05-11 RX ADMIN — IPRATROPIUM BROMIDE AND ALBUTEROL SULFATE 3 ML: .5; 3 SOLUTION RESPIRATORY (INHALATION) at 01:05

## 2021-05-11 RX ADMIN — Medication 10 ML: at 11:05

## 2021-05-11 RX ADMIN — MIDODRINE HYDROCHLORIDE 10 MG: 5 TABLET ORAL at 03:05

## 2021-05-12 PROBLEM — J40 TRACHEOBRONCHITIS: Status: RESOLVED | Noted: 2021-04-21 | Resolved: 2021-05-12

## 2021-05-12 PROBLEM — S43.015A ANTERIOR DISLOCATION OF LEFT SHOULDER: Status: RESOLVED | Noted: 2021-04-22 | Resolved: 2021-05-12

## 2021-05-12 LAB
ALBUMIN SERPL BCP-MCNC: 2.5 G/DL (ref 3.5–5.2)
ALP SERPL-CCNC: 187 U/L (ref 55–135)
ALT SERPL W/O P-5'-P-CCNC: 45 U/L (ref 10–44)
ANION GAP SERPL CALC-SCNC: 6 MMOL/L (ref 8–16)
AST SERPL-CCNC: 29 U/L (ref 10–40)
BACTERIA STL CULT: NORMAL
BASOPHILS # BLD AUTO: 0.02 K/UL (ref 0–0.2)
BASOPHILS NFR BLD: 0.4 % (ref 0–1.9)
BILIRUB SERPL-MCNC: 0.5 MG/DL (ref 0.1–1)
BUN SERPL-MCNC: 7 MG/DL (ref 6–20)
CALCIUM SERPL-MCNC: 9.1 MG/DL (ref 8.7–10.5)
CHLORIDE SERPL-SCNC: 105 MMOL/L (ref 95–110)
CO2 SERPL-SCNC: 31 MMOL/L (ref 23–29)
CREAT SERPL-MCNC: 0.5 MG/DL (ref 0.5–1.4)
DIFFERENTIAL METHOD: ABNORMAL
EOSINOPHIL # BLD AUTO: 0.2 K/UL (ref 0–0.5)
EOSINOPHIL NFR BLD: 4 % (ref 0–8)
ERYTHROCYTE [DISTWIDTH] IN BLOOD BY AUTOMATED COUNT: 18.4 % (ref 11.5–14.5)
EST. GFR  (AFRICAN AMERICAN): >60 ML/MIN/1.73 M^2
EST. GFR  (NON AFRICAN AMERICAN): >60 ML/MIN/1.73 M^2
GLUCOSE SERPL-MCNC: 100 MG/DL (ref 70–110)
HCT VFR BLD AUTO: 28.9 % (ref 37–48.5)
HGB BLD-MCNC: 9 G/DL (ref 12–16)
IMM GRANULOCYTES # BLD AUTO: 0.01 K/UL (ref 0–0.04)
IMM GRANULOCYTES NFR BLD AUTO: 0.2 % (ref 0–0.5)
LYMPHOCYTES # BLD AUTO: 1.4 K/UL (ref 1–4.8)
LYMPHOCYTES NFR BLD: 31.3 % (ref 18–48)
MCH RBC QN AUTO: 29.7 PG (ref 27–31)
MCHC RBC AUTO-ENTMCNC: 31.1 G/DL (ref 32–36)
MCV RBC AUTO: 95 FL (ref 82–98)
MONOCYTES # BLD AUTO: 0.6 K/UL (ref 0.3–1)
MONOCYTES NFR BLD: 13.4 % (ref 4–15)
NEUTROPHILS # BLD AUTO: 2.3 K/UL (ref 1.8–7.7)
NEUTROPHILS NFR BLD: 50.7 % (ref 38–73)
NRBC BLD-RTO: 0 /100 WBC
PLATELET # BLD AUTO: 364 K/UL (ref 150–450)
PMV BLD AUTO: 10.9 FL (ref 9.2–12.9)
POCT GLUCOSE: 102 MG/DL (ref 70–110)
POCT GLUCOSE: 116 MG/DL (ref 70–110)
POCT GLUCOSE: 124 MG/DL (ref 70–110)
POCT GLUCOSE: 95 MG/DL (ref 70–110)
POTASSIUM SERPL-SCNC: 3.8 MMOL/L (ref 3.5–5.1)
PROT SERPL-MCNC: 6.9 G/DL (ref 6–8.4)
RBC # BLD AUTO: 3.03 M/UL (ref 4–5.4)
SODIUM SERPL-SCNC: 142 MMOL/L (ref 136–145)
WBC # BLD AUTO: 4.47 K/UL (ref 3.9–12.7)

## 2021-05-12 PROCEDURE — 20000000 HC ICU ROOM

## 2021-05-12 PROCEDURE — 85025 COMPLETE CBC W/AUTO DIFF WBC: CPT | Performed by: INTERNAL MEDICINE

## 2021-05-12 PROCEDURE — A4216 STERILE WATER/SALINE, 10 ML: HCPCS | Performed by: HOSPITALIST

## 2021-05-12 PROCEDURE — 99233 PR SUBSEQUENT HOSPITAL CARE,LEVL III: ICD-10-PCS | Mod: ,,, | Performed by: INTERNAL MEDICINE

## 2021-05-12 PROCEDURE — 63600175 PHARM REV CODE 636 W HCPCS: Performed by: HOSPITALIST

## 2021-05-12 PROCEDURE — 94761 N-INVAS EAR/PLS OXIMETRY MLT: CPT

## 2021-05-12 PROCEDURE — 94668 MNPJ CHEST WALL SBSQ: CPT

## 2021-05-12 PROCEDURE — 99900026 HC AIRWAY MAINTENANCE (STAT)

## 2021-05-12 PROCEDURE — C9113 INJ PANTOPRAZOLE SODIUM, VIA: HCPCS | Performed by: HOSPITALIST

## 2021-05-12 PROCEDURE — 25000242 PHARM REV CODE 250 ALT 637 W/ HCPCS: Performed by: HOSPITALIST

## 2021-05-12 PROCEDURE — 94640 AIRWAY INHALATION TREATMENT: CPT

## 2021-05-12 PROCEDURE — 63600175 PHARM REV CODE 636 W HCPCS: Performed by: INTERNAL MEDICINE

## 2021-05-12 PROCEDURE — 27200966 HC CLOSED SUCTION SYSTEM

## 2021-05-12 PROCEDURE — 25000003 PHARM REV CODE 250: Performed by: INTERNAL MEDICINE

## 2021-05-12 PROCEDURE — 27000221 HC OXYGEN, UP TO 24 HOURS

## 2021-05-12 PROCEDURE — 99233 SBSQ HOSP IP/OBS HIGH 50: CPT | Mod: ,,, | Performed by: INTERNAL MEDICINE

## 2021-05-12 PROCEDURE — 80053 COMPREHEN METABOLIC PANEL: CPT | Performed by: INTERNAL MEDICINE

## 2021-05-12 PROCEDURE — 25000003 PHARM REV CODE 250: Performed by: HOSPITALIST

## 2021-05-12 RX ADMIN — MIDODRINE HYDROCHLORIDE 10 MG: 5 TABLET ORAL at 02:05

## 2021-05-12 RX ADMIN — IPRATROPIUM BROMIDE AND ALBUTEROL SULFATE 3 ML: .5; 3 SOLUTION RESPIRATORY (INHALATION) at 08:05

## 2021-05-12 RX ADMIN — AMPICILLIN SODIUM AND SULBACTAM SODIUM 1.5 G: 1; .5 INJECTION, POWDER, FOR SOLUTION INTRAMUSCULAR; INTRAVENOUS at 11:05

## 2021-05-12 RX ADMIN — VANCOMYCIN HYDROCHLORIDE 250 MG: KIT at 06:05

## 2021-05-12 RX ADMIN — Medication 10 ML: at 06:05

## 2021-05-12 RX ADMIN — ACETYLCYSTEINE 4 ML: 100 INHALANT RESPIRATORY (INHALATION) at 11:05

## 2021-05-12 RX ADMIN — MUPIROCIN: 20 OINTMENT TOPICAL at 08:05

## 2021-05-12 RX ADMIN — MIDODRINE HYDROCHLORIDE 10 MG: 5 TABLET ORAL at 08:05

## 2021-05-12 RX ADMIN — Medication 10 ML: at 11:05

## 2021-05-12 RX ADMIN — VANCOMYCIN HYDROCHLORIDE 250 MG: KIT at 11:05

## 2021-05-12 RX ADMIN — PANTOPRAZOLE SODIUM 40 MG: 40 INJECTION, POWDER, FOR SOLUTION INTRAVENOUS at 08:05

## 2021-05-12 RX ADMIN — MEROPENEM AND SODIUM CHLORIDE 1 G: 1 INJECTION, SOLUTION INTRAVENOUS at 01:05

## 2021-05-12 RX ADMIN — ACETYLCYSTEINE 4 ML: 100 INHALANT RESPIRATORY (INHALATION) at 03:05

## 2021-05-12 RX ADMIN — IPRATROPIUM BROMIDE AND ALBUTEROL SULFATE 3 ML: .5; 3 SOLUTION RESPIRATORY (INHALATION) at 03:05

## 2021-05-12 RX ADMIN — VANCOMYCIN HYDROCHLORIDE 250 MG: KIT at 05:05

## 2021-05-12 RX ADMIN — MEROPENEM AND SODIUM CHLORIDE 1 G: 1 INJECTION, SOLUTION INTRAVENOUS at 08:05

## 2021-05-12 RX ADMIN — ACETYLCYSTEINE 4 ML: 100 INHALANT RESPIRATORY (INHALATION) at 08:05

## 2021-05-12 RX ADMIN — IPRATROPIUM BROMIDE AND ALBUTEROL SULFATE 3 ML: .5; 3 SOLUTION RESPIRATORY (INHALATION) at 11:05

## 2021-05-12 RX ADMIN — Medication 10 ML: at 05:05

## 2021-05-12 RX ADMIN — ENOXAPARIN SODIUM 40 MG: 40 INJECTION SUBCUTANEOUS at 05:05

## 2021-05-12 RX ADMIN — AMPICILLIN SODIUM AND SULBACTAM SODIUM 1.5 G: 1; .5 INJECTION, POWDER, FOR SOLUTION INTRAMUSCULAR; INTRAVENOUS at 05:05

## 2021-05-13 LAB
ALBUMIN SERPL BCP-MCNC: 2.6 G/DL (ref 3.5–5.2)
ALP SERPL-CCNC: 197 U/L (ref 55–135)
ALT SERPL W/O P-5'-P-CCNC: 45 U/L (ref 10–44)
ANION GAP SERPL CALC-SCNC: 7 MMOL/L (ref 8–16)
AST SERPL-CCNC: 30 U/L (ref 10–40)
BACTERIA BLD CULT: NORMAL
BILIRUB SERPL-MCNC: 0.5 MG/DL (ref 0.1–1)
BUN SERPL-MCNC: 8 MG/DL (ref 6–20)
CALCIUM SERPL-MCNC: 9.2 MG/DL (ref 8.7–10.5)
CHLORIDE SERPL-SCNC: 105 MMOL/L (ref 95–110)
CO2 SERPL-SCNC: 31 MMOL/L (ref 23–29)
CREAT SERPL-MCNC: 0.5 MG/DL (ref 0.5–1.4)
EST. GFR  (AFRICAN AMERICAN): >60 ML/MIN/1.73 M^2
EST. GFR  (NON AFRICAN AMERICAN): >60 ML/MIN/1.73 M^2
GLUCOSE SERPL-MCNC: 92 MG/DL (ref 70–110)
POCT GLUCOSE: 113 MG/DL (ref 70–110)
POCT GLUCOSE: 137 MG/DL (ref 70–110)
POCT GLUCOSE: 92 MG/DL (ref 70–110)
POCT GLUCOSE: 98 MG/DL (ref 70–110)
POTASSIUM SERPL-SCNC: 3.9 MMOL/L (ref 3.5–5.1)
PROT SERPL-MCNC: 7.3 G/DL (ref 6–8.4)
SODIUM SERPL-SCNC: 143 MMOL/L (ref 136–145)

## 2021-05-13 PROCEDURE — 94640 AIRWAY INHALATION TREATMENT: CPT

## 2021-05-13 PROCEDURE — 20000000 HC ICU ROOM

## 2021-05-13 PROCEDURE — 94668 MNPJ CHEST WALL SBSQ: CPT

## 2021-05-13 PROCEDURE — 25000003 PHARM REV CODE 250: Performed by: INTERNAL MEDICINE

## 2021-05-13 PROCEDURE — 80053 COMPREHEN METABOLIC PANEL: CPT | Performed by: INTERNAL MEDICINE

## 2021-05-13 PROCEDURE — 63600175 PHARM REV CODE 636 W HCPCS: Performed by: HOSPITALIST

## 2021-05-13 PROCEDURE — 99233 PR SUBSEQUENT HOSPITAL CARE,LEVL III: ICD-10-PCS | Mod: ,,, | Performed by: INTERNAL MEDICINE

## 2021-05-13 PROCEDURE — C9113 INJ PANTOPRAZOLE SODIUM, VIA: HCPCS | Performed by: HOSPITALIST

## 2021-05-13 PROCEDURE — 94761 N-INVAS EAR/PLS OXIMETRY MLT: CPT

## 2021-05-13 PROCEDURE — A4216 STERILE WATER/SALINE, 10 ML: HCPCS | Performed by: HOSPITALIST

## 2021-05-13 PROCEDURE — 63600175 PHARM REV CODE 636 W HCPCS: Performed by: INTERNAL MEDICINE

## 2021-05-13 PROCEDURE — 25000242 PHARM REV CODE 250 ALT 637 W/ HCPCS: Performed by: HOSPITALIST

## 2021-05-13 PROCEDURE — 99900035 HC TECH TIME PER 15 MIN (STAT)

## 2021-05-13 PROCEDURE — 25000003 PHARM REV CODE 250: Performed by: HOSPITALIST

## 2021-05-13 PROCEDURE — 99900026 HC AIRWAY MAINTENANCE (STAT)

## 2021-05-13 PROCEDURE — 99233 SBSQ HOSP IP/OBS HIGH 50: CPT | Mod: ,,, | Performed by: INTERNAL MEDICINE

## 2021-05-13 RX ORDER — ACETYLCYSTEINE 100 MG/ML
4 SOLUTION ORAL; RESPIRATORY (INHALATION) EVERY 8 HOURS
Refills: 12
Start: 2021-05-13

## 2021-05-13 RX ORDER — ACETAMINOPHEN 325 MG/1
650 TABLET ORAL EVERY 4 HOURS PRN
Refills: 0
Start: 2021-05-13

## 2021-05-13 RX ORDER — IPRATROPIUM BROMIDE AND ALBUTEROL SULFATE 2.5; .5 MG/3ML; MG/3ML
3 SOLUTION RESPIRATORY (INHALATION) EVERY 6 HOURS
Qty: 1 BOX | Refills: 0
Start: 2021-05-13

## 2021-05-13 RX ADMIN — MORPHINE SULFATE 2 MG: 4 INJECTION, SOLUTION INTRAMUSCULAR; INTRAVENOUS at 11:05

## 2021-05-13 RX ADMIN — MORPHINE SULFATE 2 MG: 4 INJECTION, SOLUTION INTRAMUSCULAR; INTRAVENOUS at 05:05

## 2021-05-13 RX ADMIN — Medication 10 ML: at 11:05

## 2021-05-13 RX ADMIN — VANCOMYCIN HYDROCHLORIDE 250 MG: KIT at 11:05

## 2021-05-13 RX ADMIN — MUPIROCIN: 20 OINTMENT TOPICAL at 09:05

## 2021-05-13 RX ADMIN — ENOXAPARIN SODIUM 40 MG: 40 INJECTION SUBCUTANEOUS at 05:05

## 2021-05-13 RX ADMIN — PANTOPRAZOLE SODIUM 40 MG: 40 INJECTION, POWDER, FOR SOLUTION INTRAVENOUS at 09:05

## 2021-05-13 RX ADMIN — AMPICILLIN SODIUM AND SULBACTAM SODIUM 1.5 G: 1; .5 INJECTION, POWDER, FOR SOLUTION INTRAMUSCULAR; INTRAVENOUS at 05:05

## 2021-05-13 RX ADMIN — IPRATROPIUM BROMIDE AND ALBUTEROL SULFATE 3 ML: .5; 3 SOLUTION RESPIRATORY (INHALATION) at 03:05

## 2021-05-13 RX ADMIN — IPRATROPIUM BROMIDE AND ALBUTEROL SULFATE 3 ML: .5; 3 SOLUTION RESPIRATORY (INHALATION) at 08:05

## 2021-05-13 RX ADMIN — VANCOMYCIN HYDROCHLORIDE 250 MG: KIT at 05:05

## 2021-05-13 RX ADMIN — MIDODRINE HYDROCHLORIDE 10 MG: 5 TABLET ORAL at 03:05

## 2021-05-13 RX ADMIN — ACETYLCYSTEINE 4 ML: 100 INHALANT RESPIRATORY (INHALATION) at 03:05

## 2021-05-13 RX ADMIN — AMPICILLIN SODIUM AND SULBACTAM SODIUM 1.5 G: 1; .5 INJECTION, POWDER, FOR SOLUTION INTRAMUSCULAR; INTRAVENOUS at 11:05

## 2021-05-13 RX ADMIN — MIDODRINE HYDROCHLORIDE 10 MG: 5 TABLET ORAL at 09:05

## 2021-05-13 RX ADMIN — ACETYLCYSTEINE 4 ML: 100 INHALANT RESPIRATORY (INHALATION) at 08:05

## 2021-05-13 RX ADMIN — Medication 10 ML: at 05:05

## 2021-05-14 VITALS
RESPIRATION RATE: 11 BRPM | BODY MASS INDEX: 26.22 KG/M2 | WEIGHT: 138.88 LBS | TEMPERATURE: 97 F | DIASTOLIC BLOOD PRESSURE: 80 MMHG | SYSTOLIC BLOOD PRESSURE: 119 MMHG | OXYGEN SATURATION: 95 % | HEART RATE: 61 BPM | HEIGHT: 61 IN

## 2021-05-14 LAB
ALBUMIN SERPL BCP-MCNC: 2.5 G/DL (ref 3.5–5.2)
ALP SERPL-CCNC: 203 U/L (ref 55–135)
ALT SERPL W/O P-5'-P-CCNC: 40 U/L (ref 10–44)
ANION GAP SERPL CALC-SCNC: 6 MMOL/L (ref 8–16)
AST SERPL-CCNC: 25 U/L (ref 10–40)
BILIRUB SERPL-MCNC: 0.5 MG/DL (ref 0.1–1)
BUN SERPL-MCNC: 9 MG/DL (ref 6–20)
CALCIUM SERPL-MCNC: 8.9 MG/DL (ref 8.7–10.5)
CHLORIDE SERPL-SCNC: 105 MMOL/L (ref 95–110)
CO2 SERPL-SCNC: 31 MMOL/L (ref 23–29)
CREAT SERPL-MCNC: 0.5 MG/DL (ref 0.5–1.4)
EST. GFR  (AFRICAN AMERICAN): >60 ML/MIN/1.73 M^2
EST. GFR  (NON AFRICAN AMERICAN): >60 ML/MIN/1.73 M^2
GLUCOSE SERPL-MCNC: 97 MG/DL (ref 70–110)
POCT GLUCOSE: 109 MG/DL (ref 70–110)
POCT GLUCOSE: 120 MG/DL (ref 70–110)
POTASSIUM SERPL-SCNC: 3.8 MMOL/L (ref 3.5–5.1)
PROT SERPL-MCNC: 7.3 G/DL (ref 6–8.4)
SODIUM SERPL-SCNC: 142 MMOL/L (ref 136–145)

## 2021-05-14 PROCEDURE — 25000003 PHARM REV CODE 250: Performed by: INTERNAL MEDICINE

## 2021-05-14 PROCEDURE — 94761 N-INVAS EAR/PLS OXIMETRY MLT: CPT

## 2021-05-14 PROCEDURE — 94640 AIRWAY INHALATION TREATMENT: CPT

## 2021-05-14 PROCEDURE — 63600175 PHARM REV CODE 636 W HCPCS: Performed by: INTERNAL MEDICINE

## 2021-05-14 PROCEDURE — 80053 COMPREHEN METABOLIC PANEL: CPT | Performed by: INTERNAL MEDICINE

## 2021-05-14 PROCEDURE — 99900026 HC AIRWAY MAINTENANCE (STAT)

## 2021-05-14 PROCEDURE — A4216 STERILE WATER/SALINE, 10 ML: HCPCS | Performed by: HOSPITALIST

## 2021-05-14 PROCEDURE — 27000221 HC OXYGEN, UP TO 24 HOURS

## 2021-05-14 PROCEDURE — 25000242 PHARM REV CODE 250 ALT 637 W/ HCPCS: Performed by: HOSPITALIST

## 2021-05-14 PROCEDURE — 25000003 PHARM REV CODE 250: Performed by: HOSPITALIST

## 2021-05-14 PROCEDURE — C9113 INJ PANTOPRAZOLE SODIUM, VIA: HCPCS | Performed by: HOSPITALIST

## 2021-05-14 PROCEDURE — 63600175 PHARM REV CODE 636 W HCPCS: Performed by: HOSPITALIST

## 2021-05-14 PROCEDURE — 99900035 HC TECH TIME PER 15 MIN (STAT)

## 2021-05-14 RX ADMIN — AMPICILLIN SODIUM AND SULBACTAM SODIUM 1.5 G: 1; .5 INJECTION, POWDER, FOR SOLUTION INTRAMUSCULAR; INTRAVENOUS at 11:05

## 2021-05-14 RX ADMIN — MIDODRINE HYDROCHLORIDE 10 MG: 5 TABLET ORAL at 08:05

## 2021-05-14 RX ADMIN — ACETYLCYSTEINE 4 ML: 100 INHALANT RESPIRATORY (INHALATION) at 12:05

## 2021-05-14 RX ADMIN — VANCOMYCIN HYDROCHLORIDE 250 MG: KIT at 11:05

## 2021-05-14 RX ADMIN — ACETYLCYSTEINE 4 ML: 100 INHALANT RESPIRATORY (INHALATION) at 04:05

## 2021-05-14 RX ADMIN — VANCOMYCIN HYDROCHLORIDE 250 MG: KIT at 05:05

## 2021-05-14 RX ADMIN — Medication 10 ML: at 05:05

## 2021-05-14 RX ADMIN — MIDODRINE HYDROCHLORIDE 10 MG: 5 TABLET ORAL at 02:05

## 2021-05-14 RX ADMIN — AMPICILLIN SODIUM AND SULBACTAM SODIUM 1.5 G: 1; .5 INJECTION, POWDER, FOR SOLUTION INTRAMUSCULAR; INTRAVENOUS at 05:05

## 2021-05-14 RX ADMIN — IPRATROPIUM BROMIDE AND ALBUTEROL SULFATE 3 ML: .5; 3 SOLUTION RESPIRATORY (INHALATION) at 12:05

## 2021-05-14 RX ADMIN — IPRATROPIUM BROMIDE AND ALBUTEROL SULFATE 3 ML: .5; 3 SOLUTION RESPIRATORY (INHALATION) at 07:05

## 2021-05-14 RX ADMIN — Medication 10 ML: at 11:05

## 2021-05-14 RX ADMIN — PANTOPRAZOLE SODIUM 40 MG: 40 INJECTION, POWDER, FOR SOLUTION INTRAVENOUS at 08:05

## 2021-05-14 RX ADMIN — IPRATROPIUM BROMIDE AND ALBUTEROL SULFATE 3 ML: .5; 3 SOLUTION RESPIRATORY (INHALATION) at 04:05

## 2021-05-14 RX ADMIN — MUPIROCIN: 20 OINTMENT TOPICAL at 08:05

## 2021-05-14 RX ADMIN — ACETYLCYSTEINE 4 ML: 100 INHALANT RESPIRATORY (INHALATION) at 07:05

## 2021-05-15 LAB
BACTERIA BLD CULT: NORMAL
BACTERIA BLD CULT: NORMAL

## 2021-10-15 ENCOUNTER — HOSPITAL ENCOUNTER (INPATIENT)
Facility: HOSPITAL | Age: 61
LOS: 7 days | Discharge: HOME | DRG: 870 | End: 2021-10-22
Attending: EMERGENCY MEDICINE | Admitting: HOSPITALIST
Payer: MEDICARE

## 2021-10-15 DIAGNOSIS — J69.0 ASPIRATION PNEUMONIA OF BOTH LUNGS, UNSPECIFIED ASPIRATION PNEUMONIA TYPE, UNSPECIFIED PART OF LUNG: Primary | ICD-10-CM

## 2021-10-15 DIAGNOSIS — J96.21 ACUTE ON CHRONIC RESPIRATORY FAILURE WITH HYPOXIA: ICD-10-CM

## 2021-10-15 DIAGNOSIS — R00.0 TACHYCARDIA: ICD-10-CM

## 2021-10-15 LAB
ALBUMIN SERPL BCP-MCNC: 2.9 G/DL (ref 3.5–5.2)
ALP SERPL-CCNC: 135 U/L (ref 55–135)
ALT SERPL W/O P-5'-P-CCNC: 47 U/L (ref 10–44)
ANION GAP SERPL CALC-SCNC: 15 MMOL/L (ref 8–16)
AST SERPL-CCNC: 28 U/L (ref 10–40)
BACTERIA #/AREA URNS HPF: ABNORMAL /HPF
BACTERIA #/AREA URNS HPF: ABNORMAL /HPF
BASOPHILS # BLD AUTO: 0.05 K/UL (ref 0–0.2)
BASOPHILS NFR BLD: 0.2 % (ref 0–1.9)
BILIRUB SERPL-MCNC: 0.9 MG/DL (ref 0.1–1)
BILIRUB UR QL STRIP: ABNORMAL
BILIRUB UR QL STRIP: NEGATIVE
BNP SERPL-MCNC: 89 PG/ML (ref 0–99)
BUN SERPL-MCNC: 46 MG/DL (ref 6–20)
CALCIUM SERPL-MCNC: 10.4 MG/DL (ref 8.7–10.5)
CAOX CRY URNS QL MICRO: ABNORMAL
CHLORIDE SERPL-SCNC: 104 MMOL/L (ref 95–110)
CLARITY UR: ABNORMAL
CLARITY UR: ABNORMAL
CO2 SERPL-SCNC: 18 MMOL/L (ref 23–29)
COLOR UR: YELLOW
COLOR UR: YELLOW
CREAT SERPL-MCNC: 0.7 MG/DL (ref 0.5–1.4)
DIFFERENTIAL METHOD: ABNORMAL
EOSINOPHIL # BLD AUTO: 0 K/UL (ref 0–0.5)
EOSINOPHIL NFR BLD: 0 % (ref 0–8)
ERYTHROCYTE [DISTWIDTH] IN BLOOD BY AUTOMATED COUNT: 16.8 % (ref 11.5–14.5)
EST. GFR  (AFRICAN AMERICAN): >60 ML/MIN/1.73 M^2
EST. GFR  (NON AFRICAN AMERICAN): >60 ML/MIN/1.73 M^2
GLUCOSE SERPL-MCNC: 59 MG/DL (ref 70–110)
GLUCOSE UR QL STRIP: NEGATIVE
GLUCOSE UR QL STRIP: NEGATIVE
HCT VFR BLD AUTO: 36.3 % (ref 37–48.5)
HGB BLD-MCNC: 11.4 G/DL (ref 12–16)
HGB UR QL STRIP: ABNORMAL
HGB UR QL STRIP: ABNORMAL
HYALINE CASTS #/AREA URNS LPF: 0 /LPF
HYALINE CASTS #/AREA URNS LPF: 0 /LPF
IMM GRANULOCYTES # BLD AUTO: 0.17 K/UL (ref 0–0.04)
IMM GRANULOCYTES NFR BLD AUTO: 0.8 % (ref 0–0.5)
INFLUENZA A, MOLECULAR: NEGATIVE
INFLUENZA B, MOLECULAR: NEGATIVE
KETONES UR QL STRIP: NEGATIVE
KETONES UR QL STRIP: NEGATIVE
LACTATE SERPL-SCNC: 1.7 MMOL/L (ref 0.5–2.2)
LEUKOCYTE ESTERASE UR QL STRIP: ABNORMAL
LEUKOCYTE ESTERASE UR QL STRIP: ABNORMAL
LYMPHOCYTES # BLD AUTO: 0.5 K/UL (ref 1–4.8)
LYMPHOCYTES NFR BLD: 2.1 % (ref 18–48)
MCH RBC QN AUTO: 27.9 PG (ref 27–31)
MCHC RBC AUTO-ENTMCNC: 31.4 G/DL (ref 32–36)
MCV RBC AUTO: 89 FL (ref 82–98)
MICROSCOPIC COMMENT: ABNORMAL
MICROSCOPIC COMMENT: ABNORMAL
MONOCYTES # BLD AUTO: 0.6 K/UL (ref 0.3–1)
MONOCYTES NFR BLD: 2.5 % (ref 4–15)
NEUTROPHILS # BLD AUTO: 20.4 K/UL (ref 1.8–7.7)
NEUTROPHILS NFR BLD: 94.4 % (ref 38–73)
NITRITE UR QL STRIP: NEGATIVE
NITRITE UR QL STRIP: POSITIVE
NRBC BLD-RTO: 2 /100 WBC
PH UR STRIP: 8 [PH] (ref 5–8)
PH UR STRIP: >=9 [PH] (ref 5–8)
PLATELET # BLD AUTO: 236 K/UL (ref 150–450)
PLATELET BLD QL SMEAR: ABNORMAL
PMV BLD AUTO: 11.4 FL (ref 9.2–12.9)
POCT GLUCOSE: 137 MG/DL (ref 70–110)
POTASSIUM SERPL-SCNC: 5.2 MMOL/L (ref 3.5–5.1)
PROT SERPL-MCNC: 7.9 G/DL (ref 6–8.4)
PROT UR QL STRIP: ABNORMAL
PROT UR QL STRIP: ABNORMAL
RBC # BLD AUTO: 4.08 M/UL (ref 4–5.4)
RBC #/AREA URNS HPF: 10 /HPF (ref 0–4)
RBC #/AREA URNS HPF: 20 /HPF (ref 0–4)
SARS-COV-2 RDRP RESP QL NAA+PROBE: NEGATIVE
SODIUM SERPL-SCNC: 137 MMOL/L (ref 136–145)
SP GR UR STRIP: 1.01 (ref 1–1.03)
SP GR UR STRIP: <=1.005 (ref 1–1.03)
SPECIMEN SOURCE: NORMAL
SQUAMOUS #/AREA URNS HPF: 5 /HPF
SQUAMOUS #/AREA URNS HPF: 8 /HPF
TRI-PHOS CRY URNS QL MICRO: ABNORMAL
URN SPEC COLLECT METH UR: ABNORMAL
URN SPEC COLLECT METH UR: ABNORMAL
UROBILINOGEN UR STRIP-ACNC: NEGATIVE EU/DL
UROBILINOGEN UR STRIP-ACNC: NEGATIVE EU/DL
WBC # BLD AUTO: 21.65 K/UL (ref 3.9–12.7)
WBC #/AREA URNS HPF: 12 /HPF (ref 0–5)
WBC #/AREA URNS HPF: >100 /HPF (ref 0–5)
WBC CLUMPS URNS QL MICRO: ABNORMAL

## 2021-10-15 PROCEDURE — 25000003 PHARM REV CODE 250

## 2021-10-15 PROCEDURE — 25000003 PHARM REV CODE 250: Performed by: EMERGENCY MEDICINE

## 2021-10-15 PROCEDURE — 83605 ASSAY OF LACTIC ACID: CPT | Performed by: EMERGENCY MEDICINE

## 2021-10-15 PROCEDURE — 83880 ASSAY OF NATRIURETIC PEPTIDE: CPT | Performed by: EMERGENCY MEDICINE

## 2021-10-15 PROCEDURE — 87186 SC STD MICRODIL/AGAR DIL: CPT | Mod: 59 | Performed by: EMERGENCY MEDICINE

## 2021-10-15 PROCEDURE — 81000 URINALYSIS NONAUTO W/SCOPE: CPT | Mod: 91 | Performed by: EMERGENCY MEDICINE

## 2021-10-15 PROCEDURE — 94761 N-INVAS EAR/PLS OXIMETRY MLT: CPT

## 2021-10-15 PROCEDURE — 96361 HYDRATE IV INFUSION ADD-ON: CPT

## 2021-10-15 PROCEDURE — 36415 COLL VENOUS BLD VENIPUNCTURE: CPT | Performed by: EMERGENCY MEDICINE

## 2021-10-15 PROCEDURE — S5010 5% DEXTROSE AND 0.45% SALINE: HCPCS | Performed by: EMERGENCY MEDICINE

## 2021-10-15 PROCEDURE — 80053 COMPREHEN METABOLIC PANEL: CPT | Performed by: EMERGENCY MEDICINE

## 2021-10-15 PROCEDURE — 87040 BLOOD CULTURE FOR BACTERIA: CPT | Performed by: EMERGENCY MEDICINE

## 2021-10-15 PROCEDURE — 63600175 PHARM REV CODE 636 W HCPCS: Performed by: EMERGENCY MEDICINE

## 2021-10-15 PROCEDURE — 99900026 HC AIRWAY MAINTENANCE (STAT)

## 2021-10-15 PROCEDURE — 96365 THER/PROPH/DIAG IV INF INIT: CPT

## 2021-10-15 PROCEDURE — 82962 GLUCOSE BLOOD TEST: CPT

## 2021-10-15 PROCEDURE — 87077 CULTURE AEROBIC IDENTIFY: CPT | Mod: 59 | Performed by: EMERGENCY MEDICINE

## 2021-10-15 PROCEDURE — 87086 URINE CULTURE/COLONY COUNT: CPT | Mod: 59 | Performed by: EMERGENCY MEDICINE

## 2021-10-15 PROCEDURE — 87205 SMEAR GRAM STAIN: CPT | Performed by: EMERGENCY MEDICINE

## 2021-10-15 PROCEDURE — 96367 TX/PROPH/DG ADDL SEQ IV INF: CPT

## 2021-10-15 PROCEDURE — U0002 COVID-19 LAB TEST NON-CDC: HCPCS | Performed by: EMERGENCY MEDICINE

## 2021-10-15 PROCEDURE — 25500020 PHARM REV CODE 255

## 2021-10-15 PROCEDURE — 99291 CRITICAL CARE FIRST HOUR: CPT | Mod: 25

## 2021-10-15 PROCEDURE — 93010 EKG 12-LEAD: ICD-10-PCS | Mod: ,,, | Performed by: INTERNAL MEDICINE

## 2021-10-15 PROCEDURE — 87502 INFLUENZA DNA AMP PROBE: CPT | Performed by: EMERGENCY MEDICINE

## 2021-10-15 PROCEDURE — 85025 COMPLETE CBC W/AUTO DIFF WBC: CPT | Performed by: EMERGENCY MEDICINE

## 2021-10-15 PROCEDURE — 20000000 HC ICU ROOM

## 2021-10-15 PROCEDURE — 96375 TX/PRO/DX INJ NEW DRUG ADDON: CPT

## 2021-10-15 PROCEDURE — 63600175 PHARM REV CODE 636 W HCPCS: Performed by: NURSE PRACTITIONER

## 2021-10-15 PROCEDURE — 87086 URINE CULTURE/COLONY COUNT: CPT | Performed by: NURSE PRACTITIONER

## 2021-10-15 PROCEDURE — 93005 ELECTROCARDIOGRAM TRACING: CPT

## 2021-10-15 PROCEDURE — 94002 VENT MGMT INPAT INIT DAY: CPT

## 2021-10-15 PROCEDURE — 87070 CULTURE OTHR SPECIMN AEROBIC: CPT | Performed by: EMERGENCY MEDICINE

## 2021-10-15 PROCEDURE — 81000 URINALYSIS NONAUTO W/SCOPE: CPT | Performed by: NURSE PRACTITIONER

## 2021-10-15 PROCEDURE — 87088 URINE BACTERIA CULTURE: CPT | Performed by: EMERGENCY MEDICINE

## 2021-10-15 PROCEDURE — 99900035 HC TECH TIME PER 15 MIN (STAT)

## 2021-10-15 PROCEDURE — 51702 INSERT TEMP BLADDER CATH: CPT

## 2021-10-15 PROCEDURE — 93010 ELECTROCARDIOGRAM REPORT: CPT | Mod: ,,, | Performed by: INTERNAL MEDICINE

## 2021-10-15 RX ORDER — LANOLIN ALCOHOL/MO/W.PET/CERES
800 CREAM (GRAM) TOPICAL
Status: DISCONTINUED | OUTPATIENT
Start: 2021-10-15 | End: 2021-10-22 | Stop reason: HOSPADM

## 2021-10-15 RX ORDER — ACETAMINOPHEN 325 MG/1
650 TABLET ORAL EVERY 4 HOURS PRN
Status: DISCONTINUED | OUTPATIENT
Start: 2021-10-15 | End: 2021-10-22 | Stop reason: HOSPADM

## 2021-10-15 RX ORDER — ENOXAPARIN SODIUM 100 MG/ML
40 INJECTION SUBCUTANEOUS EVERY 24 HOURS
Status: DISCONTINUED | OUTPATIENT
Start: 2021-10-15 | End: 2021-10-22 | Stop reason: HOSPADM

## 2021-10-15 RX ORDER — NALOXONE HCL 0.4 MG/ML
0.02 VIAL (ML) INJECTION
Status: DISCONTINUED | OUTPATIENT
Start: 2021-10-15 | End: 2021-10-22 | Stop reason: HOSPADM

## 2021-10-15 RX ORDER — IBUPROFEN 200 MG
16 TABLET ORAL
Status: DISCONTINUED | OUTPATIENT
Start: 2021-10-15 | End: 2021-10-22 | Stop reason: HOSPADM

## 2021-10-15 RX ORDER — SODIUM CHLORIDE 0.9 % (FLUSH) 0.9 %
10 SYRINGE (ML) INJECTION EVERY 12 HOURS PRN
Status: DISCONTINUED | OUTPATIENT
Start: 2021-10-15 | End: 2021-10-22 | Stop reason: HOSPADM

## 2021-10-15 RX ORDER — GLUCAGON 1 MG
1 KIT INJECTION
Status: DISCONTINUED | OUTPATIENT
Start: 2021-10-15 | End: 2021-10-22 | Stop reason: HOSPADM

## 2021-10-15 RX ORDER — DEXTROSE MONOHYDRATE AND SODIUM CHLORIDE 5; .45 G/100ML; G/100ML
INJECTION, SOLUTION INTRAVENOUS CONTINUOUS
Status: DISCONTINUED | OUTPATIENT
Start: 2021-10-15 | End: 2021-10-17

## 2021-10-15 RX ORDER — DEXTROSE 50 % IN WATER (D50W) INTRAVENOUS SYRINGE
25
Status: COMPLETED | OUTPATIENT
Start: 2021-10-15 | End: 2021-10-15

## 2021-10-15 RX ORDER — ONDANSETRON 2 MG/ML
4 INJECTION INTRAMUSCULAR; INTRAVENOUS EVERY 8 HOURS PRN
Status: DISCONTINUED | OUTPATIENT
Start: 2021-10-15 | End: 2021-10-22 | Stop reason: HOSPADM

## 2021-10-15 RX ORDER — CEFEPIME HYDROCHLORIDE 1 G/50ML
2 INJECTION, SOLUTION INTRAVENOUS
Status: COMPLETED | OUTPATIENT
Start: 2021-10-15 | End: 2021-10-15

## 2021-10-15 RX ORDER — MIDODRINE HYDROCHLORIDE 5 MG/1
10 TABLET ORAL 3 TIMES DAILY
Status: DISCONTINUED | OUTPATIENT
Start: 2021-10-16 | End: 2021-10-22 | Stop reason: HOSPADM

## 2021-10-15 RX ORDER — IBUPROFEN 200 MG
24 TABLET ORAL
Status: DISCONTINUED | OUTPATIENT
Start: 2021-10-15 | End: 2021-10-22 | Stop reason: HOSPADM

## 2021-10-15 RX ORDER — ACETAMINOPHEN 325 MG/1
650 TABLET ORAL EVERY 6 HOURS PRN
Status: DISCONTINUED | OUTPATIENT
Start: 2021-10-15 | End: 2021-10-22 | Stop reason: HOSPADM

## 2021-10-15 RX ORDER — CEFEPIME HYDROCHLORIDE 1 G/50ML
2 INJECTION, SOLUTION INTRAVENOUS
Status: DISCONTINUED | OUTPATIENT
Start: 2021-10-16 | End: 2021-10-19

## 2021-10-15 RX ORDER — IPRATROPIUM BROMIDE AND ALBUTEROL SULFATE 2.5; .5 MG/3ML; MG/3ML
3 SOLUTION RESPIRATORY (INHALATION) EVERY 4 HOURS
Status: DISCONTINUED | OUTPATIENT
Start: 2021-10-16 | End: 2021-10-22 | Stop reason: HOSPADM

## 2021-10-15 RX ORDER — INSULIN ASPART 100 [IU]/ML
0-5 INJECTION, SOLUTION INTRAVENOUS; SUBCUTANEOUS
Status: DISCONTINUED | OUTPATIENT
Start: 2021-10-15 | End: 2021-10-22 | Stop reason: HOSPADM

## 2021-10-15 RX ADMIN — ENOXAPARIN SODIUM 40 MG: 100 INJECTION SUBCUTANEOUS at 11:10

## 2021-10-15 RX ADMIN — VANCOMYCIN HYDROCHLORIDE 1250 MG: 1.25 INJECTION, POWDER, LYOPHILIZED, FOR SOLUTION INTRAVENOUS at 07:10

## 2021-10-15 RX ADMIN — DEXTROSE MONOHYDRATE 25 G: 500 INJECTION PARENTERAL at 07:10

## 2021-10-15 RX ADMIN — SODIUM CHLORIDE, SODIUM LACTATE, POTASSIUM CHLORIDE, AND CALCIUM CHLORIDE 1000 ML: .6; .31; .03; .02 INJECTION, SOLUTION INTRAVENOUS at 04:10

## 2021-10-15 RX ADMIN — CEFEPIME HYDROCHLORIDE 2 G: 2 INJECTION, SOLUTION INTRAVENOUS at 06:10

## 2021-10-15 RX ADMIN — DEXTROSE AND SODIUM CHLORIDE: 5; .45 INJECTION, SOLUTION INTRAVENOUS at 11:10

## 2021-10-15 RX ADMIN — SODIUM CHLORIDE, SODIUM LACTATE, POTASSIUM CHLORIDE, AND CALCIUM CHLORIDE 1000 ML: .6; .31; .03; .02 INJECTION, SOLUTION INTRAVENOUS at 08:10

## 2021-10-15 RX ADMIN — DEXTROSE 50 % IN WATER (D50W) INTRAVENOUS SYRINGE 25 G: at 07:10

## 2021-10-15 RX ADMIN — IOHEXOL 100 ML: 350 INJECTION, SOLUTION INTRAVENOUS at 08:10

## 2021-10-16 ENCOUNTER — OUTSIDE PLACE OF SERVICE (OUTPATIENT)
Dept: PULMONOLOGY | Facility: CLINIC | Age: 61
End: 2021-10-16
Payer: MEDICARE

## 2021-10-16 DIAGNOSIS — A41.9 SEVERE SEPSIS: ICD-10-CM

## 2021-10-16 DIAGNOSIS — J69.0 ASPIRATION PNEUMONIA OF BOTH LUNGS: ICD-10-CM

## 2021-10-16 DIAGNOSIS — Z93.0 TRACHEOSTOMY DEPENDENCE: ICD-10-CM

## 2021-10-16 DIAGNOSIS — R65.20 SEVERE SEPSIS: ICD-10-CM

## 2021-10-16 PROBLEM — J96.01 ACUTE HYPOXEMIC RESPIRATORY FAILURE: Status: ACTIVE | Noted: 2021-10-16

## 2021-10-16 LAB
ALBUMIN SERPL BCP-MCNC: 2.7 G/DL (ref 3.5–5.2)
ALP SERPL-CCNC: 142 U/L (ref 55–135)
ALT SERPL W/O P-5'-P-CCNC: 45 U/L (ref 10–44)
ANION GAP SERPL CALC-SCNC: 15 MMOL/L (ref 8–16)
AST SERPL-CCNC: 31 U/L (ref 10–40)
BASOPHILS # BLD AUTO: 0.19 K/UL (ref 0–0.2)
BASOPHILS NFR BLD: 0.7 % (ref 0–1.9)
BILIRUB SERPL-MCNC: 1 MG/DL (ref 0.1–1)
BUN SERPL-MCNC: 41 MG/DL (ref 6–20)
CALCIUM SERPL-MCNC: 10.1 MG/DL (ref 8.7–10.5)
CHLORIDE SERPL-SCNC: 103 MMOL/L (ref 95–110)
CO2 SERPL-SCNC: 15 MMOL/L (ref 23–29)
CREAT SERPL-MCNC: 0.7 MG/DL (ref 0.5–1.4)
DIFFERENTIAL METHOD: ABNORMAL
EOSINOPHIL # BLD AUTO: 0 K/UL (ref 0–0.5)
EOSINOPHIL NFR BLD: 0.1 % (ref 0–8)
ERYTHROCYTE [DISTWIDTH] IN BLOOD BY AUTOMATED COUNT: 17.2 % (ref 11.5–14.5)
EST. GFR  (AFRICAN AMERICAN): >60 ML/MIN/1.73 M^2
EST. GFR  (NON AFRICAN AMERICAN): >60 ML/MIN/1.73 M^2
GLUCOSE SERPL-MCNC: 188 MG/DL (ref 70–110)
GRAM STN SPEC: NORMAL
GRAM STN SPEC: NORMAL
HCT VFR BLD AUTO: 38.5 % (ref 37–48.5)
HGB BLD-MCNC: 11.5 G/DL (ref 12–16)
IMM GRANULOCYTES # BLD AUTO: 0.2 K/UL (ref 0–0.04)
IMM GRANULOCYTES NFR BLD AUTO: 0.7 % (ref 0–0.5)
LYMPHOCYTES # BLD AUTO: 1.1 K/UL (ref 1–4.8)
LYMPHOCYTES NFR BLD: 4.2 % (ref 18–48)
MAGNESIUM SERPL-MCNC: 2 MG/DL (ref 1.6–2.6)
MCH RBC QN AUTO: 28.1 PG (ref 27–31)
MCHC RBC AUTO-ENTMCNC: 29.9 G/DL (ref 32–36)
MCV RBC AUTO: 94 FL (ref 82–98)
MONOCYTES # BLD AUTO: 1 K/UL (ref 0.3–1)
MONOCYTES NFR BLD: 3.8 % (ref 4–15)
NEUTROPHILS # BLD AUTO: 24.7 K/UL (ref 1.8–7.7)
NEUTROPHILS NFR BLD: 90.5 % (ref 38–73)
NRBC BLD-RTO: 2 /100 WBC
PLATELET # BLD AUTO: 248 K/UL (ref 150–450)
PLATELET BLD QL SMEAR: ABNORMAL
PMV BLD AUTO: 11.7 FL (ref 9.2–12.9)
POCT GLUCOSE: 134 MG/DL (ref 70–110)
POCT GLUCOSE: 140 MG/DL (ref 70–110)
POCT GLUCOSE: 240 MG/DL (ref 70–110)
POTASSIUM SERPL-SCNC: 4.7 MMOL/L (ref 3.5–5.1)
PROCALCITONIN SERPL IA-MCNC: 1.6 NG/ML
PROT SERPL-MCNC: 7.4 G/DL (ref 6–8.4)
RBC # BLD AUTO: 4.09 M/UL (ref 4–5.4)
SODIUM SERPL-SCNC: 133 MMOL/L (ref 136–145)
WBC # BLD AUTO: 27.28 K/UL (ref 3.9–12.7)

## 2021-10-16 PROCEDURE — 94640 AIRWAY INHALATION TREATMENT: CPT

## 2021-10-16 PROCEDURE — 25000003 PHARM REV CODE 250: Performed by: NURSE PRACTITIONER

## 2021-10-16 PROCEDURE — 25000242 PHARM REV CODE 250 ALT 637 W/ HCPCS: Performed by: NURSE PRACTITIONER

## 2021-10-16 PROCEDURE — 63600175 PHARM REV CODE 636 W HCPCS: Performed by: NURSE PRACTITIONER

## 2021-10-16 PROCEDURE — 85025 COMPLETE CBC W/AUTO DIFF WBC: CPT | Performed by: NURSE PRACTITIONER

## 2021-10-16 PROCEDURE — 80053 COMPREHEN METABOLIC PANEL: CPT | Performed by: NURSE PRACTITIONER

## 2021-10-16 PROCEDURE — 99223 1ST HOSP IP/OBS HIGH 75: CPT | Mod: S$GLB,,, | Performed by: INTERNAL MEDICINE

## 2021-10-16 PROCEDURE — C9399 UNCLASSIFIED DRUGS OR BIOLOG: HCPCS | Performed by: HOSPITALIST

## 2021-10-16 PROCEDURE — 27000207 HC ISOLATION

## 2021-10-16 PROCEDURE — 97161 PT EVAL LOW COMPLEX 20 MIN: CPT

## 2021-10-16 PROCEDURE — 99900026 HC AIRWAY MAINTENANCE (STAT)

## 2021-10-16 PROCEDURE — 36415 COLL VENOUS BLD VENIPUNCTURE: CPT | Performed by: HOSPITALIST

## 2021-10-16 PROCEDURE — 94003 VENT MGMT INPAT SUBQ DAY: CPT

## 2021-10-16 PROCEDURE — 36415 COLL VENOUS BLD VENIPUNCTURE: CPT | Performed by: NURSE PRACTITIONER

## 2021-10-16 PROCEDURE — 84145 PROCALCITONIN (PCT): CPT | Performed by: HOSPITALIST

## 2021-10-16 PROCEDURE — S5010 5% DEXTROSE AND 0.45% SALINE: HCPCS | Performed by: EMERGENCY MEDICINE

## 2021-10-16 PROCEDURE — 97110 THERAPEUTIC EXERCISES: CPT

## 2021-10-16 PROCEDURE — 25000003 PHARM REV CODE 250: Performed by: EMERGENCY MEDICINE

## 2021-10-16 PROCEDURE — 25000003 PHARM REV CODE 250: Performed by: HOSPITALIST

## 2021-10-16 PROCEDURE — 99900035 HC TECH TIME PER 15 MIN (STAT)

## 2021-10-16 PROCEDURE — 94761 N-INVAS EAR/PLS OXIMETRY MLT: CPT

## 2021-10-16 PROCEDURE — 83735 ASSAY OF MAGNESIUM: CPT | Performed by: NURSE PRACTITIONER

## 2021-10-16 PROCEDURE — 27000221 HC OXYGEN, UP TO 24 HOURS

## 2021-10-16 PROCEDURE — 99223 PR INITIAL HOSPITAL CARE,LEVL III: ICD-10-PCS | Mod: S$GLB,,, | Performed by: INTERNAL MEDICINE

## 2021-10-16 PROCEDURE — 20000000 HC ICU ROOM

## 2021-10-16 RX ORDER — MUPIROCIN 20 MG/G
OINTMENT TOPICAL 2 TIMES DAILY
Status: COMPLETED | OUTPATIENT
Start: 2021-10-16 | End: 2021-10-20

## 2021-10-16 RX ORDER — NOREPINEPHRINE BITARTRATE/D5W 8 MG/250ML
0-3 PLASTIC BAG, INJECTION (ML) INTRAVENOUS CONTINUOUS
Status: DISCONTINUED | OUTPATIENT
Start: 2021-10-16 | End: 2021-10-22 | Stop reason: HOSPADM

## 2021-10-16 RX ORDER — DEXMEDETOMIDINE HYDROCHLORIDE 4 UG/ML
0-1.4 INJECTION INTRAVENOUS CONTINUOUS
Status: DISCONTINUED | OUTPATIENT
Start: 2021-10-16 | End: 2021-10-22 | Stop reason: HOSPADM

## 2021-10-16 RX ADMIN — IPRATROPIUM BROMIDE AND ALBUTEROL SULFATE 3 ML: 2.5; .5 SOLUTION RESPIRATORY (INHALATION) at 04:10

## 2021-10-16 RX ADMIN — DEXTROSE AND SODIUM CHLORIDE: 5; .45 INJECTION, SOLUTION INTRAVENOUS at 08:10

## 2021-10-16 RX ADMIN — VANCOMYCIN HYDROCHLORIDE 750 MG: 750 INJECTION, POWDER, LYOPHILIZED, FOR SOLUTION INTRAVENOUS at 08:10

## 2021-10-16 RX ADMIN — IPRATROPIUM BROMIDE AND ALBUTEROL SULFATE 3 ML: 2.5; .5 SOLUTION RESPIRATORY (INHALATION) at 07:10

## 2021-10-16 RX ADMIN — INSULIN ASPART 2 UNITS: 100 INJECTION, SOLUTION INTRAVENOUS; SUBCUTANEOUS at 11:10

## 2021-10-16 RX ADMIN — DEXTROSE AND SODIUM CHLORIDE: 5; .45 INJECTION, SOLUTION INTRAVENOUS at 10:10

## 2021-10-16 RX ADMIN — IPRATROPIUM BROMIDE AND ALBUTEROL SULFATE 3 ML: 2.5; .5 SOLUTION RESPIRATORY (INHALATION) at 11:10

## 2021-10-16 RX ADMIN — NOREPINEPHRINE BITARTRATE 0.5 MCG/KG/MIN: 8 SOLUTION at 11:10

## 2021-10-16 RX ADMIN — MIDODRINE HYDROCHLORIDE 10 MG: 5 TABLET ORAL at 08:10

## 2021-10-16 RX ADMIN — MUPIROCIN: 20 OINTMENT TOPICAL at 08:10

## 2021-10-16 RX ADMIN — CEFEPIME HYDROCHLORIDE 2 G: 2 INJECTION, SOLUTION INTRAVENOUS at 05:10

## 2021-10-16 RX ADMIN — MIDODRINE HYDROCHLORIDE 10 MG: 5 TABLET ORAL at 09:10

## 2021-10-16 RX ADMIN — NOREPINEPHRINE BITARTRATE 0.5 MCG/KG/MIN: 8 SOLUTION at 04:10

## 2021-10-16 RX ADMIN — MUPIROCIN: 20 OINTMENT TOPICAL at 09:10

## 2021-10-16 RX ADMIN — CEFEPIME HYDROCHLORIDE 2 G: 2 INJECTION, SOLUTION INTRAVENOUS at 06:10

## 2021-10-16 RX ADMIN — NOREPINEPHRINE BITARTRATE 0.5 MCG/KG/MIN: 8 SOLUTION at 08:10

## 2021-10-16 RX ADMIN — ENOXAPARIN SODIUM 40 MG: 100 INJECTION SUBCUTANEOUS at 05:10

## 2021-10-16 RX ADMIN — NOREPINEPHRINE BITARTRATE 1 MCG/KG/MIN: 8 SOLUTION at 01:10

## 2021-10-16 RX ADMIN — DEXMEDETOMIDINE HYDROCHLORIDE 0.7 MCG/KG/HR: 4 INJECTION INTRAVENOUS at 08:10

## 2021-10-16 RX ADMIN — DEXMEDETOMIDINE HYDROCHLORIDE 0.7 MCG/KG/HR: 4 INJECTION INTRAVENOUS at 04:10

## 2021-10-16 RX ADMIN — IPRATROPIUM BROMIDE AND ALBUTEROL SULFATE 3 ML: 2.5; .5 SOLUTION RESPIRATORY (INHALATION) at 12:10

## 2021-10-16 RX ADMIN — NOREPINEPHRINE BITARTRATE 0.5 MCG/KG/MIN: 8 SOLUTION at 06:10

## 2021-10-17 LAB
ALBUMIN SERPL BCP-MCNC: 2.3 G/DL (ref 3.5–5.2)
ALP SERPL-CCNC: 122 U/L (ref 55–135)
ALT SERPL W/O P-5'-P-CCNC: 35 U/L (ref 10–44)
ANION GAP SERPL CALC-SCNC: 12 MMOL/L (ref 8–16)
AST SERPL-CCNC: 21 U/L (ref 10–40)
BACTERIA UR CULT: NORMAL
BACTERIA UR CULT: NORMAL
BASOPHILS # BLD AUTO: 0.02 K/UL (ref 0–0.2)
BASOPHILS NFR BLD: 0.2 % (ref 0–1.9)
BILIRUB SERPL-MCNC: 0.8 MG/DL (ref 0.1–1)
BUN SERPL-MCNC: 23 MG/DL (ref 6–20)
CALCIUM SERPL-MCNC: 9.5 MG/DL (ref 8.7–10.5)
CHLORIDE SERPL-SCNC: 104 MMOL/L (ref 95–110)
CO2 SERPL-SCNC: 17 MMOL/L (ref 23–29)
CREAT SERPL-MCNC: 0.6 MG/DL (ref 0.5–1.4)
DIFFERENTIAL METHOD: ABNORMAL
EOSINOPHIL # BLD AUTO: 0.2 K/UL (ref 0–0.5)
EOSINOPHIL NFR BLD: 1.7 % (ref 0–8)
ERYTHROCYTE [DISTWIDTH] IN BLOOD BY AUTOMATED COUNT: 17.1 % (ref 11.5–14.5)
EST. GFR  (AFRICAN AMERICAN): >60 ML/MIN/1.73 M^2
EST. GFR  (NON AFRICAN AMERICAN): >60 ML/MIN/1.73 M^2
GLUCOSE SERPL-MCNC: 129 MG/DL (ref 70–110)
HCT VFR BLD AUTO: 31.5 % (ref 37–48.5)
HGB BLD-MCNC: 9.8 G/DL (ref 12–16)
IMM GRANULOCYTES # BLD AUTO: 0.07 K/UL (ref 0–0.04)
IMM GRANULOCYTES NFR BLD AUTO: 0.7 % (ref 0–0.5)
LYMPHOCYTES # BLD AUTO: 0.9 K/UL (ref 1–4.8)
LYMPHOCYTES NFR BLD: 8.9 % (ref 18–48)
MAGNESIUM SERPL-MCNC: 1.7 MG/DL (ref 1.6–2.6)
MCH RBC QN AUTO: 28 PG (ref 27–31)
MCHC RBC AUTO-ENTMCNC: 31.1 G/DL (ref 32–36)
MCV RBC AUTO: 90 FL (ref 82–98)
MONOCYTES # BLD AUTO: 0.8 K/UL (ref 0.3–1)
MONOCYTES NFR BLD: 8 % (ref 4–15)
NEUTROPHILS # BLD AUTO: 7.8 K/UL (ref 1.8–7.7)
NEUTROPHILS NFR BLD: 80.5 % (ref 38–73)
NRBC BLD-RTO: 2 /100 WBC
PLATELET # BLD AUTO: 158 K/UL (ref 150–450)
PLATELET BLD QL SMEAR: ABNORMAL
PMV BLD AUTO: 11 FL (ref 9.2–12.9)
POCT GLUCOSE: 104 MG/DL (ref 70–110)
POCT GLUCOSE: 175 MG/DL (ref 70–110)
POCT GLUCOSE: 99 MG/DL (ref 70–110)
POTASSIUM SERPL-SCNC: 4.1 MMOL/L (ref 3.5–5.1)
PROT SERPL-MCNC: 6.6 G/DL (ref 6–8.4)
RBC # BLD AUTO: 3.5 M/UL (ref 4–5.4)
SODIUM SERPL-SCNC: 133 MMOL/L (ref 136–145)
VANCOMYCIN TROUGH SERPL-MCNC: 30.3 UG/ML (ref 10–22)
WBC # BLD AUTO: 9.64 K/UL (ref 3.9–12.7)

## 2021-10-17 PROCEDURE — 94640 AIRWAY INHALATION TREATMENT: CPT

## 2021-10-17 PROCEDURE — 99900026 HC AIRWAY MAINTENANCE (STAT)

## 2021-10-17 PROCEDURE — 25000003 PHARM REV CODE 250: Performed by: NURSE PRACTITIONER

## 2021-10-17 PROCEDURE — S5010 5% DEXTROSE AND 0.45% SALINE: HCPCS | Performed by: EMERGENCY MEDICINE

## 2021-10-17 PROCEDURE — 36415 COLL VENOUS BLD VENIPUNCTURE: CPT | Performed by: EMERGENCY MEDICINE

## 2021-10-17 PROCEDURE — 20000000 HC ICU ROOM

## 2021-10-17 PROCEDURE — 80202 ASSAY OF VANCOMYCIN: CPT | Performed by: EMERGENCY MEDICINE

## 2021-10-17 PROCEDURE — C9399 UNCLASSIFIED DRUGS OR BIOLOG: HCPCS | Performed by: HOSPITALIST

## 2021-10-17 PROCEDURE — 99233 SBSQ HOSP IP/OBS HIGH 50: CPT | Mod: S$GLB,,, | Performed by: INTERNAL MEDICINE

## 2021-10-17 PROCEDURE — 27000207 HC ISOLATION

## 2021-10-17 PROCEDURE — 63600175 PHARM REV CODE 636 W HCPCS: Performed by: NURSE PRACTITIONER

## 2021-10-17 PROCEDURE — 99233 PR SUBSEQUENT HOSPITAL CARE,LEVL III: ICD-10-PCS | Mod: S$GLB,,, | Performed by: INTERNAL MEDICINE

## 2021-10-17 PROCEDURE — 85025 COMPLETE CBC W/AUTO DIFF WBC: CPT | Performed by: NURSE PRACTITIONER

## 2021-10-17 PROCEDURE — 80053 COMPREHEN METABOLIC PANEL: CPT | Performed by: NURSE PRACTITIONER

## 2021-10-17 PROCEDURE — 94003 VENT MGMT INPAT SUBQ DAY: CPT

## 2021-10-17 PROCEDURE — 25000003 PHARM REV CODE 250: Performed by: EMERGENCY MEDICINE

## 2021-10-17 PROCEDURE — 99900035 HC TECH TIME PER 15 MIN (STAT)

## 2021-10-17 PROCEDURE — 94761 N-INVAS EAR/PLS OXIMETRY MLT: CPT

## 2021-10-17 PROCEDURE — 83735 ASSAY OF MAGNESIUM: CPT | Performed by: NURSE PRACTITIONER

## 2021-10-17 PROCEDURE — 25000242 PHARM REV CODE 250 ALT 637 W/ HCPCS: Performed by: NURSE PRACTITIONER

## 2021-10-17 PROCEDURE — 25000003 PHARM REV CODE 250: Performed by: HOSPITALIST

## 2021-10-17 PROCEDURE — 27000221 HC OXYGEN, UP TO 24 HOURS

## 2021-10-17 RX ADMIN — MIDODRINE HYDROCHLORIDE 10 MG: 5 TABLET ORAL at 03:10

## 2021-10-17 RX ADMIN — MIDODRINE HYDROCHLORIDE 10 MG: 5 TABLET ORAL at 08:10

## 2021-10-17 RX ADMIN — NOREPINEPHRINE BITARTRATE 0.3 MCG/KG/MIN: 8 SOLUTION at 06:10

## 2021-10-17 RX ADMIN — IPRATROPIUM BROMIDE AND ALBUTEROL SULFATE 3 ML: 2.5; .5 SOLUTION RESPIRATORY (INHALATION) at 03:10

## 2021-10-17 RX ADMIN — MUPIROCIN: 20 OINTMENT TOPICAL at 09:10

## 2021-10-17 RX ADMIN — NOREPINEPHRINE BITARTRATE 0.4 MCG/KG/MIN: 8 SOLUTION at 08:10

## 2021-10-17 RX ADMIN — CEFEPIME HYDROCHLORIDE 2 G: 2 INJECTION, SOLUTION INTRAVENOUS at 07:10

## 2021-10-17 RX ADMIN — DEXTROSE AND SODIUM CHLORIDE: 5; .45 INJECTION, SOLUTION INTRAVENOUS at 06:10

## 2021-10-17 RX ADMIN — IPRATROPIUM BROMIDE AND ALBUTEROL SULFATE 3 ML: 2.5; .5 SOLUTION RESPIRATORY (INHALATION) at 07:10

## 2021-10-17 RX ADMIN — VANCOMYCIN HYDROCHLORIDE 750 MG: 750 INJECTION, POWDER, LYOPHILIZED, FOR SOLUTION INTRAVENOUS at 07:10

## 2021-10-17 RX ADMIN — NOREPINEPHRINE BITARTRATE 0.5 MCG/KG/MIN: 8 SOLUTION at 01:10

## 2021-10-17 RX ADMIN — IPRATROPIUM BROMIDE AND ALBUTEROL SULFATE 3 ML: 2.5; .5 SOLUTION RESPIRATORY (INHALATION) at 04:10

## 2021-10-17 RX ADMIN — IPRATROPIUM BROMIDE AND ALBUTEROL SULFATE 3 ML: 2.5; .5 SOLUTION RESPIRATORY (INHALATION) at 12:10

## 2021-10-17 RX ADMIN — CEFEPIME HYDROCHLORIDE 2 G: 2 INJECTION, SOLUTION INTRAVENOUS at 05:10

## 2021-10-17 RX ADMIN — IPRATROPIUM BROMIDE AND ALBUTEROL SULFATE 3 ML: 2.5; .5 SOLUTION RESPIRATORY (INHALATION) at 11:10

## 2021-10-17 RX ADMIN — DEXMEDETOMIDINE HYDROCHLORIDE 0.8 MCG/KG/HR: 4 INJECTION INTRAVENOUS at 08:10

## 2021-10-17 RX ADMIN — DEXMEDETOMIDINE HYDROCHLORIDE 0.8 MCG/KG/HR: 4 INJECTION INTRAVENOUS at 05:10

## 2021-10-17 RX ADMIN — ENOXAPARIN SODIUM 40 MG: 100 INJECTION SUBCUTANEOUS at 05:10

## 2021-10-17 RX ADMIN — DEXMEDETOMIDINE HYDROCHLORIDE 0.7 MCG/KG/HR: 4 INJECTION INTRAVENOUS at 01:10

## 2021-10-18 LAB
ALBUMIN SERPL BCP-MCNC: 2.2 G/DL (ref 3.5–5.2)
ALP SERPL-CCNC: 129 U/L (ref 55–135)
ALT SERPL W/O P-5'-P-CCNC: 31 U/L (ref 10–44)
ANION GAP SERPL CALC-SCNC: 12 MMOL/L (ref 8–16)
AST SERPL-CCNC: 21 U/L (ref 10–40)
BACTERIA UR CULT: ABNORMAL
BASOPHILS # BLD AUTO: 0.01 K/UL (ref 0–0.2)
BASOPHILS NFR BLD: 0.2 % (ref 0–1.9)
BILIRUB SERPL-MCNC: 1 MG/DL (ref 0.1–1)
BUN SERPL-MCNC: 19 MG/DL (ref 6–20)
CALCIUM SERPL-MCNC: 9.6 MG/DL (ref 8.7–10.5)
CHLORIDE SERPL-SCNC: 105 MMOL/L (ref 95–110)
CO2 SERPL-SCNC: 16 MMOL/L (ref 23–29)
CREAT SERPL-MCNC: 0.6 MG/DL (ref 0.5–1.4)
DIFFERENTIAL METHOD: ABNORMAL
EOSINOPHIL # BLD AUTO: 0.1 K/UL (ref 0–0.5)
EOSINOPHIL NFR BLD: 1.8 % (ref 0–8)
ERYTHROCYTE [DISTWIDTH] IN BLOOD BY AUTOMATED COUNT: 17.3 % (ref 11.5–14.5)
EST. GFR  (AFRICAN AMERICAN): >60 ML/MIN/1.73 M^2
EST. GFR  (NON AFRICAN AMERICAN): >60 ML/MIN/1.73 M^2
GLUCOSE SERPL-MCNC: 95 MG/DL (ref 70–110)
HCT VFR BLD AUTO: 29.3 % (ref 37–48.5)
HGB BLD-MCNC: 8.9 G/DL (ref 12–16)
IMM GRANULOCYTES # BLD AUTO: 0.03 K/UL (ref 0–0.04)
IMM GRANULOCYTES NFR BLD AUTO: 0.6 % (ref 0–0.5)
LYMPHOCYTES # BLD AUTO: 0.9 K/UL (ref 1–4.8)
LYMPHOCYTES NFR BLD: 18.7 % (ref 18–48)
MAGNESIUM SERPL-MCNC: 1.8 MG/DL (ref 1.6–2.6)
MCH RBC QN AUTO: 27.6 PG (ref 27–31)
MCHC RBC AUTO-ENTMCNC: 30.4 G/DL (ref 32–36)
MCV RBC AUTO: 91 FL (ref 82–98)
MONOCYTES # BLD AUTO: 0.5 K/UL (ref 0.3–1)
MONOCYTES NFR BLD: 10.4 % (ref 4–15)
NEUTROPHILS # BLD AUTO: 3.4 K/UL (ref 1.8–7.7)
NEUTROPHILS NFR BLD: 68.3 % (ref 38–73)
NRBC BLD-RTO: 3 /100 WBC
PLATELET # BLD AUTO: 136 K/UL (ref 150–450)
PMV BLD AUTO: 11.1 FL (ref 9.2–12.9)
POCT GLUCOSE: 103 MG/DL (ref 70–110)
POCT GLUCOSE: 114 MG/DL (ref 70–110)
POCT GLUCOSE: 131 MG/DL (ref 70–110)
POCT GLUCOSE: 86 MG/DL (ref 70–110)
POTASSIUM SERPL-SCNC: 4 MMOL/L (ref 3.5–5.1)
PROT SERPL-MCNC: 6.6 G/DL (ref 6–8.4)
RBC # BLD AUTO: 3.22 M/UL (ref 4–5.4)
SODIUM SERPL-SCNC: 133 MMOL/L (ref 136–145)
VANCOMYCIN SERPL-MCNC: 15.9 UG/ML
WBC # BLD AUTO: 4.91 K/UL (ref 3.9–12.7)

## 2021-10-18 PROCEDURE — 80053 COMPREHEN METABOLIC PANEL: CPT | Performed by: NURSE PRACTITIONER

## 2021-10-18 PROCEDURE — 87040 BLOOD CULTURE FOR BACTERIA: CPT | Performed by: NURSE PRACTITIONER

## 2021-10-18 PROCEDURE — 27000207 HC ISOLATION

## 2021-10-18 PROCEDURE — 63600175 PHARM REV CODE 636 W HCPCS: Performed by: HOSPITALIST

## 2021-10-18 PROCEDURE — 94003 VENT MGMT INPAT SUBQ DAY: CPT

## 2021-10-18 PROCEDURE — 99900026 HC AIRWAY MAINTENANCE (STAT)

## 2021-10-18 PROCEDURE — 99232 PR SUBSEQUENT HOSPITAL CARE,LEVL II: ICD-10-PCS | Mod: ,,, | Performed by: INTERNAL MEDICINE

## 2021-10-18 PROCEDURE — 36415 COLL VENOUS BLD VENIPUNCTURE: CPT | Performed by: NURSE PRACTITIONER

## 2021-10-18 PROCEDURE — 27000221 HC OXYGEN, UP TO 24 HOURS

## 2021-10-18 PROCEDURE — 97110 THERAPEUTIC EXERCISES: CPT

## 2021-10-18 PROCEDURE — 99900035 HC TECH TIME PER 15 MIN (STAT)

## 2021-10-18 PROCEDURE — 83735 ASSAY OF MAGNESIUM: CPT | Performed by: NURSE PRACTITIONER

## 2021-10-18 PROCEDURE — 85025 COMPLETE CBC W/AUTO DIFF WBC: CPT | Performed by: NURSE PRACTITIONER

## 2021-10-18 PROCEDURE — 20000000 HC ICU ROOM

## 2021-10-18 PROCEDURE — 80202 ASSAY OF VANCOMYCIN: CPT | Performed by: HOSPITALIST

## 2021-10-18 PROCEDURE — 63600175 PHARM REV CODE 636 W HCPCS: Performed by: NURSE PRACTITIONER

## 2021-10-18 PROCEDURE — C9399 UNCLASSIFIED DRUGS OR BIOLOG: HCPCS | Performed by: HOSPITALIST

## 2021-10-18 PROCEDURE — 27100080 HC AIRWAY ADAPTER-END TIDAL CO2

## 2021-10-18 PROCEDURE — 25000003 PHARM REV CODE 250: Performed by: NURSE PRACTITIONER

## 2021-10-18 PROCEDURE — 27200966 HC CLOSED SUCTION SYSTEM

## 2021-10-18 PROCEDURE — 25000003 PHARM REV CODE 250: Performed by: HOSPITALIST

## 2021-10-18 PROCEDURE — 99232 SBSQ HOSP IP/OBS MODERATE 35: CPT | Mod: ,,, | Performed by: INTERNAL MEDICINE

## 2021-10-18 PROCEDURE — 94761 N-INVAS EAR/PLS OXIMETRY MLT: CPT

## 2021-10-18 PROCEDURE — 36415 COLL VENOUS BLD VENIPUNCTURE: CPT | Performed by: HOSPITALIST

## 2021-10-18 PROCEDURE — 25000242 PHARM REV CODE 250 ALT 637 W/ HCPCS: Performed by: NURSE PRACTITIONER

## 2021-10-18 PROCEDURE — 94640 AIRWAY INHALATION TREATMENT: CPT

## 2021-10-18 RX ADMIN — DEXMEDETOMIDINE HYDROCHLORIDE 0.6 MCG/KG/HR: 4 INJECTION INTRAVENOUS at 05:10

## 2021-10-18 RX ADMIN — IPRATROPIUM BROMIDE AND ALBUTEROL SULFATE 3 ML: 2.5; .5 SOLUTION RESPIRATORY (INHALATION) at 07:10

## 2021-10-18 RX ADMIN — MIDODRINE HYDROCHLORIDE 10 MG: 5 TABLET ORAL at 08:10

## 2021-10-18 RX ADMIN — DEXMEDETOMIDINE HYDROCHLORIDE 0.7 MCG/KG/HR: 4 INJECTION INTRAVENOUS at 08:10

## 2021-10-18 RX ADMIN — NOREPINEPHRINE BITARTRATE 0.1 MCG/KG/MIN: 8 SOLUTION at 08:10

## 2021-10-18 RX ADMIN — IPRATROPIUM BROMIDE AND ALBUTEROL SULFATE 3 ML: 2.5; .5 SOLUTION RESPIRATORY (INHALATION) at 12:10

## 2021-10-18 RX ADMIN — IPRATROPIUM BROMIDE AND ALBUTEROL SULFATE 3 ML: 2.5; .5 SOLUTION RESPIRATORY (INHALATION) at 04:10

## 2021-10-18 RX ADMIN — ENOXAPARIN SODIUM 40 MG: 100 INJECTION SUBCUTANEOUS at 05:10

## 2021-10-18 RX ADMIN — MUPIROCIN: 20 OINTMENT TOPICAL at 08:10

## 2021-10-18 RX ADMIN — NOREPINEPHRINE BITARTRATE 0.1 MCG/KG/MIN: 8 SOLUTION at 03:10

## 2021-10-18 RX ADMIN — CEFEPIME HYDROCHLORIDE 2 G: 2 INJECTION, SOLUTION INTRAVENOUS at 05:10

## 2021-10-18 RX ADMIN — DEXMEDETOMIDINE HYDROCHLORIDE 0.6 MCG/KG/HR: 4 INJECTION INTRAVENOUS at 01:10

## 2021-10-18 RX ADMIN — MUPIROCIN: 20 OINTMENT TOPICAL at 09:10

## 2021-10-18 RX ADMIN — VANCOMYCIN HYDROCHLORIDE 750 MG: 750 INJECTION, POWDER, LYOPHILIZED, FOR SOLUTION INTRAVENOUS at 10:10

## 2021-10-18 RX ADMIN — IPRATROPIUM BROMIDE AND ALBUTEROL SULFATE 3 ML: 2.5; .5 SOLUTION RESPIRATORY (INHALATION) at 11:10

## 2021-10-18 RX ADMIN — MIDODRINE HYDROCHLORIDE 10 MG: 5 TABLET ORAL at 03:10

## 2021-10-18 RX ADMIN — CEFEPIME HYDROCHLORIDE 2 G: 2 INJECTION, SOLUTION INTRAVENOUS at 06:10

## 2021-10-18 RX ADMIN — NOREPINEPHRINE BITARTRATE 0.14 MCG/KG/MIN: 8 SOLUTION at 06:10

## 2021-10-19 PROBLEM — R78.81 BACTEREMIA: Status: ACTIVE | Noted: 2021-10-19

## 2021-10-19 LAB
ALBUMIN SERPL BCP-MCNC: 2.3 G/DL (ref 3.5–5.2)
ALP SERPL-CCNC: 124 U/L (ref 55–135)
ALT SERPL W/O P-5'-P-CCNC: 26 U/L (ref 10–44)
ANION GAP SERPL CALC-SCNC: 12 MMOL/L (ref 8–16)
AST SERPL-CCNC: 19 U/L (ref 10–40)
BASOPHILS NFR BLD: 0 % (ref 0–1.9)
BILIRUB SERPL-MCNC: 1 MG/DL (ref 0.1–1)
BUN SERPL-MCNC: 16 MG/DL (ref 6–20)
CALCIUM SERPL-MCNC: 9.5 MG/DL (ref 8.7–10.5)
CHLORIDE SERPL-SCNC: 103 MMOL/L (ref 95–110)
CO2 SERPL-SCNC: 18 MMOL/L (ref 23–29)
CREAT SERPL-MCNC: 0.6 MG/DL (ref 0.5–1.4)
DIFFERENTIAL METHOD: ABNORMAL
EOSINOPHIL NFR BLD: 2 % (ref 0–8)
ERYTHROCYTE [DISTWIDTH] IN BLOOD BY AUTOMATED COUNT: 17.3 % (ref 11.5–14.5)
EST. GFR  (AFRICAN AMERICAN): >60 ML/MIN/1.73 M^2
EST. GFR  (NON AFRICAN AMERICAN): >60 ML/MIN/1.73 M^2
GLUCOSE SERPL-MCNC: 95 MG/DL (ref 70–110)
HCT VFR BLD AUTO: 28.4 % (ref 37–48.5)
HGB BLD-MCNC: 8.7 G/DL (ref 12–16)
IMM GRANULOCYTES # BLD AUTO: ABNORMAL K/UL
IMM GRANULOCYTES NFR BLD AUTO: ABNORMAL %
LYMPHOCYTES NFR BLD: 30 % (ref 18–48)
MAGNESIUM SERPL-MCNC: 1.7 MG/DL (ref 1.6–2.6)
MCH RBC QN AUTO: 28.1 PG (ref 27–31)
MCHC RBC AUTO-ENTMCNC: 30.6 G/DL (ref 32–36)
MCV RBC AUTO: 92 FL (ref 82–98)
METAMYELOCYTES NFR BLD MANUAL: 1 %
MONOCYTES NFR BLD: 14 % (ref 4–15)
NEUTROPHILS NFR BLD: 53 % (ref 38–73)
NRBC BLD-RTO: 2 /100 WBC
PLATELET # BLD AUTO: 126 K/UL (ref 150–450)
PLATELET BLD QL SMEAR: ABNORMAL
PMV BLD AUTO: 10.3 FL (ref 9.2–12.9)
POCT GLUCOSE: 146 MG/DL (ref 70–110)
POTASSIUM SERPL-SCNC: 4.1 MMOL/L (ref 3.5–5.1)
PROT SERPL-MCNC: 6.8 G/DL (ref 6–8.4)
RBC # BLD AUTO: 3.1 M/UL (ref 4–5.4)
SODIUM SERPL-SCNC: 133 MMOL/L (ref 136–145)
WBC # BLD AUTO: 4.82 K/UL (ref 3.9–12.7)

## 2021-10-19 PROCEDURE — 83735 ASSAY OF MAGNESIUM: CPT | Performed by: NURSE PRACTITIONER

## 2021-10-19 PROCEDURE — 85007 BL SMEAR W/DIFF WBC COUNT: CPT | Performed by: NURSE PRACTITIONER

## 2021-10-19 PROCEDURE — 85027 COMPLETE CBC AUTOMATED: CPT | Performed by: NURSE PRACTITIONER

## 2021-10-19 PROCEDURE — 99900035 HC TECH TIME PER 15 MIN (STAT)

## 2021-10-19 PROCEDURE — 94761 N-INVAS EAR/PLS OXIMETRY MLT: CPT

## 2021-10-19 PROCEDURE — 94640 AIRWAY INHALATION TREATMENT: CPT

## 2021-10-19 PROCEDURE — 63600175 PHARM REV CODE 636 W HCPCS: Performed by: NURSE PRACTITIONER

## 2021-10-19 PROCEDURE — 36415 COLL VENOUS BLD VENIPUNCTURE: CPT | Performed by: NURSE PRACTITIONER

## 2021-10-19 PROCEDURE — 27000207 HC ISOLATION

## 2021-10-19 PROCEDURE — 99900026 HC AIRWAY MAINTENANCE (STAT)

## 2021-10-19 PROCEDURE — 63600175 PHARM REV CODE 636 W HCPCS: Performed by: INTERNAL MEDICINE

## 2021-10-19 PROCEDURE — 25000003 PHARM REV CODE 250: Performed by: HOSPITALIST

## 2021-10-19 PROCEDURE — 99233 PR SUBSEQUENT HOSPITAL CARE,LEVL III: ICD-10-PCS | Mod: ,,, | Performed by: INTERNAL MEDICINE

## 2021-10-19 PROCEDURE — 80053 COMPREHEN METABOLIC PANEL: CPT | Performed by: NURSE PRACTITIONER

## 2021-10-19 PROCEDURE — 20000000 HC ICU ROOM

## 2021-10-19 PROCEDURE — 94003 VENT MGMT INPAT SUBQ DAY: CPT

## 2021-10-19 PROCEDURE — 25000003 PHARM REV CODE 250: Performed by: NURSE PRACTITIONER

## 2021-10-19 PROCEDURE — 99233 SBSQ HOSP IP/OBS HIGH 50: CPT | Mod: ,,, | Performed by: INTERNAL MEDICINE

## 2021-10-19 PROCEDURE — C9399 UNCLASSIFIED DRUGS OR BIOLOG: HCPCS | Performed by: HOSPITALIST

## 2021-10-19 PROCEDURE — 27200966 HC CLOSED SUCTION SYSTEM

## 2021-10-19 PROCEDURE — 25000242 PHARM REV CODE 250 ALT 637 W/ HCPCS: Performed by: NURSE PRACTITIONER

## 2021-10-19 RX ADMIN — MIDODRINE HYDROCHLORIDE 10 MG: 5 TABLET ORAL at 08:10

## 2021-10-19 RX ADMIN — MAGNESIUM OXIDE TAB 400 MG (241.3 MG ELEMENTAL MG) 800 MG: 400 (241.3 MG) TAB at 09:10

## 2021-10-19 RX ADMIN — IPRATROPIUM BROMIDE AND ALBUTEROL SULFATE 3 ML: 2.5; .5 SOLUTION RESPIRATORY (INHALATION) at 06:10

## 2021-10-19 RX ADMIN — IPRATROPIUM BROMIDE AND ALBUTEROL SULFATE 3 ML: 2.5; .5 SOLUTION RESPIRATORY (INHALATION) at 03:10

## 2021-10-19 RX ADMIN — IPRATROPIUM BROMIDE AND ALBUTEROL SULFATE 3 ML: 2.5; .5 SOLUTION RESPIRATORY (INHALATION) at 07:10

## 2021-10-19 RX ADMIN — CEFEPIME HYDROCHLORIDE 2 G: 2 INJECTION, SOLUTION INTRAVENOUS at 05:10

## 2021-10-19 RX ADMIN — IPRATROPIUM BROMIDE AND ALBUTEROL SULFATE 3 ML: 2.5; .5 SOLUTION RESPIRATORY (INHALATION) at 12:10

## 2021-10-19 RX ADMIN — PIPERACILLIN AND TAZOBACTAM 4.5 G: 4; .5 INJECTION, POWDER, LYOPHILIZED, FOR SOLUTION INTRAVENOUS; PARENTERAL at 06:10

## 2021-10-19 RX ADMIN — MAGNESIUM OXIDE TAB 400 MG (241.3 MG ELEMENTAL MG) 800 MG: 400 (241.3 MG) TAB at 05:10

## 2021-10-19 RX ADMIN — MIDODRINE HYDROCHLORIDE 10 MG: 5 TABLET ORAL at 04:10

## 2021-10-19 RX ADMIN — DEXMEDETOMIDINE HYDROCHLORIDE 0.5 MCG/KG/HR: 4 INJECTION INTRAVENOUS at 11:10

## 2021-10-19 RX ADMIN — IPRATROPIUM BROMIDE AND ALBUTEROL SULFATE 3 ML: 2.5; .5 SOLUTION RESPIRATORY (INHALATION) at 11:10

## 2021-10-19 RX ADMIN — DEXMEDETOMIDINE HYDROCHLORIDE 0.7 MCG/KG/HR: 4 INJECTION INTRAVENOUS at 10:10

## 2021-10-19 RX ADMIN — ENOXAPARIN SODIUM 40 MG: 100 INJECTION SUBCUTANEOUS at 05:10

## 2021-10-19 RX ADMIN — IPRATROPIUM BROMIDE AND ALBUTEROL SULFATE 3 ML: 2.5; .5 SOLUTION RESPIRATORY (INHALATION) at 04:10

## 2021-10-19 RX ADMIN — MUPIROCIN: 20 OINTMENT TOPICAL at 09:10

## 2021-10-19 RX ADMIN — MUPIROCIN: 20 OINTMENT TOPICAL at 08:10

## 2021-10-19 RX ADMIN — MIDODRINE HYDROCHLORIDE 10 MG: 5 TABLET ORAL at 09:10

## 2021-10-19 RX ADMIN — DEXMEDETOMIDINE HYDROCHLORIDE 0.9 MCG/KG/HR: 4 INJECTION INTRAVENOUS at 03:10

## 2021-10-20 LAB
ALBUMIN SERPL BCP-MCNC: 2.3 G/DL (ref 3.5–5.2)
ALP SERPL-CCNC: 123 U/L (ref 55–135)
ALT SERPL W/O P-5'-P-CCNC: 25 U/L (ref 10–44)
ANION GAP SERPL CALC-SCNC: 15 MMOL/L (ref 8–16)
AST SERPL-CCNC: 20 U/L (ref 10–40)
BASOPHILS # BLD AUTO: 0.01 K/UL (ref 0–0.2)
BASOPHILS NFR BLD: 0.2 % (ref 0–1.9)
BILIRUB SERPL-MCNC: 1.3 MG/DL (ref 0.1–1)
BUN SERPL-MCNC: 15 MG/DL (ref 6–20)
CALCIUM SERPL-MCNC: 9.7 MG/DL (ref 8.7–10.5)
CHLORIDE SERPL-SCNC: 101 MMOL/L (ref 95–110)
CO2 SERPL-SCNC: 18 MMOL/L (ref 23–29)
CREAT SERPL-MCNC: 0.6 MG/DL (ref 0.5–1.4)
DIFFERENTIAL METHOD: ABNORMAL
EOSINOPHIL # BLD AUTO: 0.3 K/UL (ref 0–0.5)
EOSINOPHIL NFR BLD: 5.6 % (ref 0–8)
ERYTHROCYTE [DISTWIDTH] IN BLOOD BY AUTOMATED COUNT: 17.1 % (ref 11.5–14.5)
EST. GFR  (AFRICAN AMERICAN): >60 ML/MIN/1.73 M^2
EST. GFR  (NON AFRICAN AMERICAN): >60 ML/MIN/1.73 M^2
GLUCOSE SERPL-MCNC: 86 MG/DL (ref 70–110)
HCT VFR BLD AUTO: 27.1 % (ref 37–48.5)
HGB BLD-MCNC: 8.1 G/DL (ref 12–16)
IMM GRANULOCYTES # BLD AUTO: 0.03 K/UL (ref 0–0.04)
IMM GRANULOCYTES NFR BLD AUTO: 0.7 % (ref 0–0.5)
LYMPHOCYTES # BLD AUTO: 1 K/UL (ref 1–4.8)
LYMPHOCYTES NFR BLD: 21.4 % (ref 18–48)
MAGNESIUM SERPL-MCNC: 1.7 MG/DL (ref 1.6–2.6)
MCH RBC QN AUTO: 27.9 PG (ref 27–31)
MCHC RBC AUTO-ENTMCNC: 29.9 G/DL (ref 32–36)
MCV RBC AUTO: 93 FL (ref 82–98)
MONOCYTES # BLD AUTO: 0.6 K/UL (ref 0.3–1)
MONOCYTES NFR BLD: 13.5 % (ref 4–15)
NEUTROPHILS # BLD AUTO: 2.6 K/UL (ref 1.8–7.7)
NEUTROPHILS NFR BLD: 58.6 % (ref 38–73)
NRBC BLD-RTO: 1 /100 WBC
PLATELET # BLD AUTO: 103 K/UL (ref 150–450)
PMV BLD AUTO: 10.7 FL (ref 9.2–12.9)
POCT GLUCOSE: 114 MG/DL (ref 70–110)
POCT GLUCOSE: 60 MG/DL (ref 70–110)
POCT GLUCOSE: 84 MG/DL (ref 70–110)
POTASSIUM SERPL-SCNC: 4.1 MMOL/L (ref 3.5–5.1)
PROT SERPL-MCNC: 6.9 G/DL (ref 6–8.4)
RBC # BLD AUTO: 2.9 M/UL (ref 4–5.4)
SODIUM SERPL-SCNC: 134 MMOL/L (ref 136–145)
WBC # BLD AUTO: 4.43 K/UL (ref 3.9–12.7)

## 2021-10-20 PROCEDURE — 94640 AIRWAY INHALATION TREATMENT: CPT

## 2021-10-20 PROCEDURE — 36573 INSJ PICC RS&I 5 YR+: CPT

## 2021-10-20 PROCEDURE — C1751 CATH, INF, PER/CENT/MIDLINE: HCPCS

## 2021-10-20 PROCEDURE — 80053 COMPREHEN METABOLIC PANEL: CPT | Performed by: NURSE PRACTITIONER

## 2021-10-20 PROCEDURE — 94761 N-INVAS EAR/PLS OXIMETRY MLT: CPT

## 2021-10-20 PROCEDURE — 63600175 PHARM REV CODE 636 W HCPCS: Performed by: INTERNAL MEDICINE

## 2021-10-20 PROCEDURE — 36415 COLL VENOUS BLD VENIPUNCTURE: CPT | Performed by: NURSE PRACTITIONER

## 2021-10-20 PROCEDURE — 25000003 PHARM REV CODE 250: Performed by: NURSE PRACTITIONER

## 2021-10-20 PROCEDURE — 85025 COMPLETE CBC W/AUTO DIFF WBC: CPT | Performed by: NURSE PRACTITIONER

## 2021-10-20 PROCEDURE — 99232 SBSQ HOSP IP/OBS MODERATE 35: CPT | Mod: ,,, | Performed by: INTERNAL MEDICINE

## 2021-10-20 PROCEDURE — 99232 PR SUBSEQUENT HOSPITAL CARE,LEVL II: ICD-10-PCS | Mod: ,,, | Performed by: INTERNAL MEDICINE

## 2021-10-20 PROCEDURE — 25000242 PHARM REV CODE 250 ALT 637 W/ HCPCS: Performed by: NURSE PRACTITIONER

## 2021-10-20 PROCEDURE — 63600175 PHARM REV CODE 636 W HCPCS: Performed by: NURSE PRACTITIONER

## 2021-10-20 PROCEDURE — 94003 VENT MGMT INPAT SUBQ DAY: CPT

## 2021-10-20 PROCEDURE — 25000003 PHARM REV CODE 250: Performed by: INTERNAL MEDICINE

## 2021-10-20 PROCEDURE — 99900035 HC TECH TIME PER 15 MIN (STAT)

## 2021-10-20 PROCEDURE — 20000000 HC ICU ROOM

## 2021-10-20 PROCEDURE — 27000221 HC OXYGEN, UP TO 24 HOURS

## 2021-10-20 PROCEDURE — 99900026 HC AIRWAY MAINTENANCE (STAT)

## 2021-10-20 PROCEDURE — 36569 INSJ PICC 5 YR+ W/O IMAGING: CPT

## 2021-10-20 PROCEDURE — A4216 STERILE WATER/SALINE, 10 ML: HCPCS | Performed by: HOSPITALIST

## 2021-10-20 PROCEDURE — 25000003 PHARM REV CODE 250: Performed by: HOSPITALIST

## 2021-10-20 PROCEDURE — 83735 ASSAY OF MAGNESIUM: CPT | Performed by: NURSE PRACTITIONER

## 2021-10-20 PROCEDURE — 27000207 HC ISOLATION

## 2021-10-20 RX ORDER — ATROPINE SULFATE 0.1 MG/ML
INJECTION INTRAVENOUS
Status: DISPENSED
Start: 2021-10-20 | End: 2021-10-20

## 2021-10-20 RX ORDER — HYDROCODONE BITARTRATE AND ACETAMINOPHEN 7.5; 325 MG/1; MG/1
1 TABLET ORAL EVERY 6 HOURS PRN
Status: DISCONTINUED | OUTPATIENT
Start: 2021-10-20 | End: 2021-10-22 | Stop reason: HOSPADM

## 2021-10-20 RX ORDER — SODIUM CHLORIDE 0.9 % (FLUSH) 0.9 %
10 SYRINGE (ML) INJECTION
Status: DISCONTINUED | OUTPATIENT
Start: 2021-10-20 | End: 2021-10-22 | Stop reason: HOSPADM

## 2021-10-20 RX ORDER — SODIUM CHLORIDE 0.9 % (FLUSH) 0.9 %
10 SYRINGE (ML) INJECTION EVERY 6 HOURS
Status: DISCONTINUED | OUTPATIENT
Start: 2021-10-20 | End: 2021-10-22 | Stop reason: HOSPADM

## 2021-10-20 RX ORDER — SODIUM CHLORIDE 9 MG/ML
INJECTION, SOLUTION INTRAVENOUS CONTINUOUS
Status: DISCONTINUED | OUTPATIENT
Start: 2021-10-20 | End: 2021-10-22 | Stop reason: HOSPADM

## 2021-10-20 RX ADMIN — MUPIROCIN: 20 OINTMENT TOPICAL at 10:10

## 2021-10-20 RX ADMIN — ENOXAPARIN SODIUM 40 MG: 100 INJECTION SUBCUTANEOUS at 04:10

## 2021-10-20 RX ADMIN — IPRATROPIUM BROMIDE AND ALBUTEROL SULFATE 3 ML: 2.5; .5 SOLUTION RESPIRATORY (INHALATION) at 12:10

## 2021-10-20 RX ADMIN — Medication 16 G: at 11:10

## 2021-10-20 RX ADMIN — SODIUM CHLORIDE, PRESERVATIVE FREE 10 ML: 5 INJECTION INTRAVENOUS at 11:10

## 2021-10-20 RX ADMIN — HYDROCODONE BITARTRATE AND ACETAMINOPHEN 1 TABLET: 7.5; 325 TABLET ORAL at 04:10

## 2021-10-20 RX ADMIN — MIDODRINE HYDROCHLORIDE 10 MG: 5 TABLET ORAL at 09:10

## 2021-10-20 RX ADMIN — IPRATROPIUM BROMIDE AND ALBUTEROL SULFATE 3 ML: 2.5; .5 SOLUTION RESPIRATORY (INHALATION) at 03:10

## 2021-10-20 RX ADMIN — MIDODRINE HYDROCHLORIDE 10 MG: 5 TABLET ORAL at 10:10

## 2021-10-20 RX ADMIN — SODIUM CHLORIDE: 0.9 INJECTION, SOLUTION INTRAVENOUS at 04:10

## 2021-10-20 RX ADMIN — IPRATROPIUM BROMIDE AND ALBUTEROL SULFATE 3 ML: 2.5; .5 SOLUTION RESPIRATORY (INHALATION) at 04:10

## 2021-10-20 RX ADMIN — PIPERACILLIN AND TAZOBACTAM 4.5 G: 4; .5 INJECTION, POWDER, LYOPHILIZED, FOR SOLUTION INTRAVENOUS; PARENTERAL at 05:10

## 2021-10-20 RX ADMIN — MIDODRINE HYDROCHLORIDE 10 MG: 5 TABLET ORAL at 04:10

## 2021-10-20 RX ADMIN — PIPERACILLIN AND TAZOBACTAM 4.5 G: 4; .5 INJECTION, POWDER, LYOPHILIZED, FOR SOLUTION INTRAVENOUS; PARENTERAL at 11:10

## 2021-10-20 RX ADMIN — IPRATROPIUM BROMIDE AND ALBUTEROL SULFATE 3 ML: 2.5; .5 SOLUTION RESPIRATORY (INHALATION) at 07:10

## 2021-10-20 RX ADMIN — IPRATROPIUM BROMIDE AND ALBUTEROL SULFATE 3 ML: 2.5; .5 SOLUTION RESPIRATORY (INHALATION) at 11:10

## 2021-10-20 RX ADMIN — PIPERACILLIN AND TAZOBACTAM 4.5 G: 4; .5 INJECTION, POWDER, LYOPHILIZED, FOR SOLUTION INTRAVENOUS; PARENTERAL at 12:10

## 2021-10-20 RX ADMIN — NOREPINEPHRINE BITARTRATE 0.04 MCG/KG/MIN: 8 SOLUTION at 11:10

## 2021-10-20 RX ADMIN — SODIUM CHLORIDE: 0.9 INJECTION, SOLUTION INTRAVENOUS at 11:10

## 2021-10-20 RX ADMIN — PIPERACILLIN AND TAZOBACTAM 4.5 G: 4; .5 INJECTION, POWDER, LYOPHILIZED, FOR SOLUTION INTRAVENOUS; PARENTERAL at 06:10

## 2021-10-21 LAB
ALBUMIN SERPL BCP-MCNC: 2.3 G/DL (ref 3.5–5.2)
ALP SERPL-CCNC: 130 U/L (ref 55–135)
ALT SERPL W/O P-5'-P-CCNC: 22 U/L (ref 10–44)
ANION GAP SERPL CALC-SCNC: 11 MMOL/L (ref 8–16)
AST SERPL-CCNC: 19 U/L (ref 10–40)
BACTERIA BLD CULT: ABNORMAL
BACTERIA BLD CULT: NORMAL
BASOPHILS NFR BLD: 0 % (ref 0–1.9)
BILIRUB SERPL-MCNC: 1 MG/DL (ref 0.1–1)
BUN SERPL-MCNC: 12 MG/DL (ref 6–20)
CALCIUM SERPL-MCNC: 9.1 MG/DL (ref 8.7–10.5)
CHLORIDE SERPL-SCNC: 103 MMOL/L (ref 95–110)
CO2 SERPL-SCNC: 21 MMOL/L (ref 23–29)
CREAT SERPL-MCNC: 0.6 MG/DL (ref 0.5–1.4)
DIFFERENTIAL METHOD: ABNORMAL
EOSINOPHIL NFR BLD: 4 % (ref 0–8)
ERYTHROCYTE [DISTWIDTH] IN BLOOD BY AUTOMATED COUNT: 16.8 % (ref 11.5–14.5)
EST. GFR  (AFRICAN AMERICAN): >60 ML/MIN/1.73 M^2
EST. GFR  (NON AFRICAN AMERICAN): >60 ML/MIN/1.73 M^2
GLUCOSE SERPL-MCNC: 64 MG/DL (ref 70–110)
HCT VFR BLD AUTO: 23.6 % (ref 37–48.5)
HGB BLD-MCNC: 7.3 G/DL (ref 12–16)
IMM GRANULOCYTES # BLD AUTO: ABNORMAL K/UL
IMM GRANULOCYTES NFR BLD AUTO: ABNORMAL %
LYMPHOCYTES NFR BLD: 27 % (ref 18–48)
MAGNESIUM SERPL-MCNC: 1.6 MG/DL (ref 1.6–2.6)
MCH RBC QN AUTO: 27.4 PG (ref 27–31)
MCHC RBC AUTO-ENTMCNC: 30.9 G/DL (ref 32–36)
MCV RBC AUTO: 89 FL (ref 82–98)
MONOCYTES NFR BLD: 10 % (ref 4–15)
NEUTROPHILS NFR BLD: 59 % (ref 38–73)
NRBC BLD-RTO: 1 /100 WBC
PLATELET # BLD AUTO: 107 K/UL (ref 150–450)
PLATELET BLD QL SMEAR: ABNORMAL
PMV BLD AUTO: 11.1 FL (ref 9.2–12.9)
POCT GLUCOSE: 66 MG/DL (ref 70–110)
POCT GLUCOSE: 69 MG/DL (ref 70–110)
POCT GLUCOSE: 77 MG/DL (ref 70–110)
POTASSIUM SERPL-SCNC: 4 MMOL/L (ref 3.5–5.1)
PROT SERPL-MCNC: 6.7 G/DL (ref 6–8.4)
RBC # BLD AUTO: 2.66 M/UL (ref 4–5.4)
SODIUM SERPL-SCNC: 135 MMOL/L (ref 136–145)
WBC # BLD AUTO: 4.59 K/UL (ref 3.9–12.7)

## 2021-10-21 PROCEDURE — 25000003 PHARM REV CODE 250: Performed by: INTERNAL MEDICINE

## 2021-10-21 PROCEDURE — 63600175 PHARM REV CODE 636 W HCPCS: Performed by: NURSE PRACTITIONER

## 2021-10-21 PROCEDURE — 25000003 PHARM REV CODE 250: Performed by: HOSPITALIST

## 2021-10-21 PROCEDURE — 94761 N-INVAS EAR/PLS OXIMETRY MLT: CPT

## 2021-10-21 PROCEDURE — 99900026 HC AIRWAY MAINTENANCE (STAT)

## 2021-10-21 PROCEDURE — 36415 COLL VENOUS BLD VENIPUNCTURE: CPT | Performed by: NURSE PRACTITIONER

## 2021-10-21 PROCEDURE — A4216 STERILE WATER/SALINE, 10 ML: HCPCS | Performed by: HOSPITALIST

## 2021-10-21 PROCEDURE — 99231 SBSQ HOSP IP/OBS SF/LOW 25: CPT | Mod: ,,, | Performed by: INTERNAL MEDICINE

## 2021-10-21 PROCEDURE — 94640 AIRWAY INHALATION TREATMENT: CPT

## 2021-10-21 PROCEDURE — 94002 VENT MGMT INPAT INIT DAY: CPT

## 2021-10-21 PROCEDURE — 25000242 PHARM REV CODE 250 ALT 637 W/ HCPCS: Performed by: NURSE PRACTITIONER

## 2021-10-21 PROCEDURE — 83735 ASSAY OF MAGNESIUM: CPT | Performed by: NURSE PRACTITIONER

## 2021-10-21 PROCEDURE — 80053 COMPREHEN METABOLIC PANEL: CPT | Performed by: NURSE PRACTITIONER

## 2021-10-21 PROCEDURE — 99900035 HC TECH TIME PER 15 MIN (STAT)

## 2021-10-21 PROCEDURE — 63600175 PHARM REV CODE 636 W HCPCS: Performed by: INTERNAL MEDICINE

## 2021-10-21 PROCEDURE — 27000207 HC ISOLATION

## 2021-10-21 PROCEDURE — 85007 BL SMEAR W/DIFF WBC COUNT: CPT | Performed by: NURSE PRACTITIONER

## 2021-10-21 PROCEDURE — 20000000 HC ICU ROOM

## 2021-10-21 PROCEDURE — 25000003 PHARM REV CODE 250: Performed by: NURSE PRACTITIONER

## 2021-10-21 PROCEDURE — 27000221 HC OXYGEN, UP TO 24 HOURS

## 2021-10-21 PROCEDURE — 99231 PR SUBSEQUENT HOSPITAL CARE,LEVL I: ICD-10-PCS | Mod: ,,, | Performed by: INTERNAL MEDICINE

## 2021-10-21 PROCEDURE — 85027 COMPLETE CBC AUTOMATED: CPT | Performed by: NURSE PRACTITIONER

## 2021-10-21 PROCEDURE — 94003 VENT MGMT INPAT SUBQ DAY: CPT

## 2021-10-21 RX ADMIN — PIPERACILLIN AND TAZOBACTAM 4.5 G: 4; .5 INJECTION, POWDER, LYOPHILIZED, FOR SOLUTION INTRAVENOUS; PARENTERAL at 06:10

## 2021-10-21 RX ADMIN — SODIUM CHLORIDE, PRESERVATIVE FREE 10 ML: 5 INJECTION INTRAVENOUS at 11:10

## 2021-10-21 RX ADMIN — MIDODRINE HYDROCHLORIDE 10 MG: 5 TABLET ORAL at 10:10

## 2021-10-21 RX ADMIN — IPRATROPIUM BROMIDE AND ALBUTEROL SULFATE 3 ML: 2.5; .5 SOLUTION RESPIRATORY (INHALATION) at 04:10

## 2021-10-21 RX ADMIN — MIDODRINE HYDROCHLORIDE 10 MG: 5 TABLET ORAL at 04:10

## 2021-10-21 RX ADMIN — IPRATROPIUM BROMIDE AND ALBUTEROL SULFATE 3 ML: 2.5; .5 SOLUTION RESPIRATORY (INHALATION) at 11:10

## 2021-10-21 RX ADMIN — SODIUM CHLORIDE, PRESERVATIVE FREE 10 ML: 5 INJECTION INTRAVENOUS at 06:10

## 2021-10-21 RX ADMIN — HYDROCODONE BITARTRATE AND ACETAMINOPHEN 1 TABLET: 7.5; 325 TABLET ORAL at 10:10

## 2021-10-21 RX ADMIN — IPRATROPIUM BROMIDE AND ALBUTEROL SULFATE 3 ML: 2.5; .5 SOLUTION RESPIRATORY (INHALATION) at 03:10

## 2021-10-21 RX ADMIN — SODIUM CHLORIDE: 0.9 INJECTION, SOLUTION INTRAVENOUS at 10:10

## 2021-10-21 RX ADMIN — IPRATROPIUM BROMIDE AND ALBUTEROL SULFATE 3 ML: 2.5; .5 SOLUTION RESPIRATORY (INHALATION) at 07:10

## 2021-10-21 RX ADMIN — IPRATROPIUM BROMIDE AND ALBUTEROL SULFATE 3 ML: 2.5; .5 SOLUTION RESPIRATORY (INHALATION) at 12:10

## 2021-10-21 RX ADMIN — PIPERACILLIN AND TAZOBACTAM 4.5 G: 4; .5 INJECTION, POWDER, LYOPHILIZED, FOR SOLUTION INTRAVENOUS; PARENTERAL at 11:10

## 2021-10-21 RX ADMIN — MIDODRINE HYDROCHLORIDE 10 MG: 5 TABLET ORAL at 09:10

## 2021-10-21 RX ADMIN — DEXTROSE MONOHYDRATE 12.5 G: 500 INJECTION PARENTERAL at 10:10

## 2021-10-21 RX ADMIN — DEXTROSE MONOHYDRATE 12.5 G: 500 INJECTION PARENTERAL at 06:10

## 2021-10-21 RX ADMIN — ACETAMINOPHEN 650 MG: 325 TABLET ORAL at 05:10

## 2021-10-21 RX ADMIN — ENOXAPARIN SODIUM 40 MG: 100 INJECTION SUBCUTANEOUS at 05:10

## 2021-10-21 RX ADMIN — PIPERACILLIN AND TAZOBACTAM 4.5 G: 4; .5 INJECTION, POWDER, LYOPHILIZED, FOR SOLUTION INTRAVENOUS; PARENTERAL at 05:10

## 2021-10-22 VITALS
HEIGHT: 60 IN | HEART RATE: 74 BPM | BODY MASS INDEX: 26.66 KG/M2 | OXYGEN SATURATION: 100 % | WEIGHT: 135.81 LBS | RESPIRATION RATE: 42 BRPM | DIASTOLIC BLOOD PRESSURE: 82 MMHG | TEMPERATURE: 97 F | SYSTOLIC BLOOD PRESSURE: 125 MMHG

## 2021-10-22 LAB
ABO + RH BLD: NORMAL
ALBUMIN SERPL BCP-MCNC: 2.3 G/DL (ref 3.5–5.2)
ALP SERPL-CCNC: 120 U/L (ref 55–135)
ALT SERPL W/O P-5'-P-CCNC: 20 U/L (ref 10–44)
ANION GAP SERPL CALC-SCNC: 9 MMOL/L (ref 8–16)
AST SERPL-CCNC: 17 U/L (ref 10–40)
BASOPHILS # BLD AUTO: 0.01 K/UL (ref 0–0.2)
BASOPHILS NFR BLD: 0.2 % (ref 0–1.9)
BILIRUB SERPL-MCNC: 0.7 MG/DL (ref 0.1–1)
BLD GP AB SCN CELLS X3 SERPL QL: NORMAL
BLD PROD TYP BPU: NORMAL
BLOOD UNIT EXPIRATION DATE: NORMAL
BLOOD UNIT TYPE CODE: 5100
BLOOD UNIT TYPE: NORMAL
BUN SERPL-MCNC: 9 MG/DL (ref 6–20)
CALCIUM SERPL-MCNC: 8.8 MG/DL (ref 8.7–10.5)
CHLORIDE SERPL-SCNC: 104 MMOL/L (ref 95–110)
CO2 SERPL-SCNC: 20 MMOL/L (ref 23–29)
CODING SYSTEM: NORMAL
CREAT SERPL-MCNC: 0.5 MG/DL (ref 0.5–1.4)
DIFFERENTIAL METHOD: ABNORMAL
DISPENSE STATUS: NORMAL
EOSINOPHIL # BLD AUTO: 0.3 K/UL (ref 0–0.5)
EOSINOPHIL NFR BLD: 4.6 % (ref 0–8)
ERYTHROCYTE [DISTWIDTH] IN BLOOD BY AUTOMATED COUNT: 16.8 % (ref 11.5–14.5)
EST. GFR  (AFRICAN AMERICAN): >60 ML/MIN/1.73 M^2
EST. GFR  (NON AFRICAN AMERICAN): >60 ML/MIN/1.73 M^2
GLUCOSE SERPL-MCNC: 78 MG/DL (ref 70–110)
HCT VFR BLD AUTO: 23.2 % (ref 37–48.5)
HGB BLD-MCNC: 7.1 G/DL (ref 12–16)
IMM GRANULOCYTES # BLD AUTO: 0.03 K/UL (ref 0–0.04)
IMM GRANULOCYTES NFR BLD AUTO: 0.5 % (ref 0–0.5)
LYMPHOCYTES # BLD AUTO: 1 K/UL (ref 1–4.8)
LYMPHOCYTES NFR BLD: 18 % (ref 18–48)
MAGNESIUM SERPL-MCNC: 1.4 MG/DL (ref 1.6–2.6)
MCH RBC QN AUTO: 27.6 PG (ref 27–31)
MCHC RBC AUTO-ENTMCNC: 30.6 G/DL (ref 32–36)
MCV RBC AUTO: 90 FL (ref 82–98)
MONOCYTES # BLD AUTO: 0.6 K/UL (ref 0.3–1)
MONOCYTES NFR BLD: 10.2 % (ref 4–15)
NEUTROPHILS # BLD AUTO: 3.7 K/UL (ref 1.8–7.7)
NEUTROPHILS NFR BLD: 66.5 % (ref 38–73)
NRBC BLD-RTO: 0 /100 WBC
NUM UNITS TRANS PACKED RBC: NORMAL
PLATELET # BLD AUTO: 142 K/UL (ref 150–450)
PLATELET BLD QL SMEAR: ABNORMAL
PMV BLD AUTO: 11 FL (ref 9.2–12.9)
POCT GLUCOSE: 126 MG/DL (ref 70–110)
POCT GLUCOSE: 75 MG/DL (ref 70–110)
POTASSIUM SERPL-SCNC: 3.7 MMOL/L (ref 3.5–5.1)
PROT SERPL-MCNC: 6.4 G/DL (ref 6–8.4)
RBC # BLD AUTO: 2.57 M/UL (ref 4–5.4)
SODIUM SERPL-SCNC: 133 MMOL/L (ref 136–145)
WBC # BLD AUTO: 5.61 K/UL (ref 3.9–12.7)

## 2021-10-22 PROCEDURE — 80053 COMPREHEN METABOLIC PANEL: CPT | Performed by: NURSE PRACTITIONER

## 2021-10-22 PROCEDURE — 36430 TRANSFUSION BLD/BLD COMPNT: CPT

## 2021-10-22 PROCEDURE — 76937 US GUIDE VASCULAR ACCESS: CPT

## 2021-10-22 PROCEDURE — 94640 AIRWAY INHALATION TREATMENT: CPT

## 2021-10-22 PROCEDURE — G0008 ADMIN INFLUENZA VIRUS VAC: HCPCS | Performed by: HOSPITALIST

## 2021-10-22 PROCEDURE — 90686 IIV4 VACC NO PRSV 0.5 ML IM: CPT | Performed by: HOSPITALIST

## 2021-10-22 PROCEDURE — 36410 VNPNXR 3YR/> PHY/QHP DX/THER: CPT

## 2021-10-22 PROCEDURE — 36415 COLL VENOUS BLD VENIPUNCTURE: CPT | Performed by: HOSPITALIST

## 2021-10-22 PROCEDURE — 86900 BLOOD TYPING SEROLOGIC ABO: CPT | Performed by: HOSPITALIST

## 2021-10-22 PROCEDURE — C1751 CATH, INF, PER/CENT/MIDLINE: HCPCS

## 2021-10-22 PROCEDURE — 25000003 PHARM REV CODE 250: Performed by: INTERNAL MEDICINE

## 2021-10-22 PROCEDURE — 99231 SBSQ HOSP IP/OBS SF/LOW 25: CPT | Mod: ,,, | Performed by: INTERNAL MEDICINE

## 2021-10-22 PROCEDURE — 94761 N-INVAS EAR/PLS OXIMETRY MLT: CPT

## 2021-10-22 PROCEDURE — 94003 VENT MGMT INPAT SUBQ DAY: CPT

## 2021-10-22 PROCEDURE — 25000242 PHARM REV CODE 250 ALT 637 W/ HCPCS: Performed by: NURSE PRACTITIONER

## 2021-10-22 PROCEDURE — A4216 STERILE WATER/SALINE, 10 ML: HCPCS | Performed by: HOSPITALIST

## 2021-10-22 PROCEDURE — 25000003 PHARM REV CODE 250: Performed by: NURSE PRACTITIONER

## 2021-10-22 PROCEDURE — 99900035 HC TECH TIME PER 15 MIN (STAT)

## 2021-10-22 PROCEDURE — 27000221 HC OXYGEN, UP TO 24 HOURS

## 2021-10-22 PROCEDURE — 99231 PR SUBSEQUENT HOSPITAL CARE,LEVL I: ICD-10-PCS | Mod: ,,, | Performed by: INTERNAL MEDICINE

## 2021-10-22 PROCEDURE — 63600175 PHARM REV CODE 636 W HCPCS: Performed by: NURSE PRACTITIONER

## 2021-10-22 PROCEDURE — 86920 COMPATIBILITY TEST SPIN: CPT | Performed by: HOSPITALIST

## 2021-10-22 PROCEDURE — 25000003 PHARM REV CODE 250: Performed by: HOSPITALIST

## 2021-10-22 PROCEDURE — 63600175 PHARM REV CODE 636 W HCPCS: Performed by: HOSPITALIST

## 2021-10-22 PROCEDURE — 83735 ASSAY OF MAGNESIUM: CPT | Performed by: NURSE PRACTITIONER

## 2021-10-22 PROCEDURE — 63600175 PHARM REV CODE 636 W HCPCS: Performed by: INTERNAL MEDICINE

## 2021-10-22 PROCEDURE — P9016 RBC LEUKOCYTES REDUCED: HCPCS | Performed by: HOSPITALIST

## 2021-10-22 PROCEDURE — 90471 IMMUNIZATION ADMIN: CPT | Performed by: HOSPITALIST

## 2021-10-22 PROCEDURE — 99900026 HC AIRWAY MAINTENANCE (STAT)

## 2021-10-22 PROCEDURE — 85025 COMPLETE CBC W/AUTO DIFF WBC: CPT | Performed by: NURSE PRACTITIONER

## 2021-10-22 RX ORDER — HYDROCODONE BITARTRATE AND ACETAMINOPHEN 500; 5 MG/1; MG/1
TABLET ORAL
Status: DISCONTINUED | OUTPATIENT
Start: 2021-10-22 | End: 2021-10-22 | Stop reason: HOSPADM

## 2021-10-22 RX ADMIN — MAGNESIUM OXIDE TAB 400 MG (241.3 MG ELEMENTAL MG) 800 MG: 400 (241.3 MG) TAB at 01:10

## 2021-10-22 RX ADMIN — MIDODRINE HYDROCHLORIDE 10 MG: 5 TABLET ORAL at 03:10

## 2021-10-22 RX ADMIN — HYDROCODONE BITARTRATE AND ACETAMINOPHEN 1 TABLET: 7.5; 325 TABLET ORAL at 01:10

## 2021-10-22 RX ADMIN — IPRATROPIUM BROMIDE AND ALBUTEROL SULFATE 3 ML: 2.5; .5 SOLUTION RESPIRATORY (INHALATION) at 08:10

## 2021-10-22 RX ADMIN — SODIUM CHLORIDE, PRESERVATIVE FREE 10 ML: 5 INJECTION INTRAVENOUS at 12:10

## 2021-10-22 RX ADMIN — IPRATROPIUM BROMIDE AND ALBUTEROL SULFATE 3 ML: 2.5; .5 SOLUTION RESPIRATORY (INHALATION) at 11:10

## 2021-10-22 RX ADMIN — SODIUM CHLORIDE, PRESERVATIVE FREE 10 ML: 5 INJECTION INTRAVENOUS at 05:10

## 2021-10-22 RX ADMIN — PIPERACILLIN AND TAZOBACTAM 4.5 G: 4; .5 INJECTION, POWDER, LYOPHILIZED, FOR SOLUTION INTRAVENOUS; PARENTERAL at 06:10

## 2021-10-22 RX ADMIN — IPRATROPIUM BROMIDE AND ALBUTEROL SULFATE 3 ML: 2.5; .5 SOLUTION RESPIRATORY (INHALATION) at 12:10

## 2021-10-22 RX ADMIN — PIPERACILLIN AND TAZOBACTAM 4.5 G: 4; .5 INJECTION, POWDER, LYOPHILIZED, FOR SOLUTION INTRAVENOUS; PARENTERAL at 05:10

## 2021-10-22 RX ADMIN — MAGNESIUM OXIDE TAB 400 MG (241.3 MG ELEMENTAL MG) 800 MG: 400 (241.3 MG) TAB at 08:10

## 2021-10-22 RX ADMIN — MAGNESIUM OXIDE TAB 400 MG (241.3 MG ELEMENTAL MG) 800 MG: 400 (241.3 MG) TAB at 05:10

## 2021-10-22 RX ADMIN — IPRATROPIUM BROMIDE AND ALBUTEROL SULFATE 3 ML: 2.5; .5 SOLUTION RESPIRATORY (INHALATION) at 03:10

## 2021-10-22 RX ADMIN — IPRATROPIUM BROMIDE AND ALBUTEROL SULFATE 3 ML: 2.5; .5 SOLUTION RESPIRATORY (INHALATION) at 04:10

## 2021-10-22 RX ADMIN — MIDODRINE HYDROCHLORIDE 10 MG: 5 TABLET ORAL at 08:10

## 2021-10-22 RX ADMIN — PIPERACILLIN AND TAZOBACTAM 4.5 G: 4; .5 INJECTION, POWDER, LYOPHILIZED, FOR SOLUTION INTRAVENOUS; PARENTERAL at 12:10

## 2021-10-22 RX ADMIN — ENOXAPARIN SODIUM 40 MG: 100 INJECTION SUBCUTANEOUS at 05:10

## 2021-10-22 RX ADMIN — INFLUENZA VIRUS VACCINE 0.5 ML: 15; 15; 15; 15 SUSPENSION INTRAMUSCULAR at 05:10

## 2021-10-24 ENCOUNTER — HOSPITAL ENCOUNTER (INPATIENT)
Facility: HOSPITAL | Age: 61
LOS: 2 days | Discharge: NURSING, REHAB OR LONG-TERM CARE | DRG: 091 | End: 2021-10-26
Attending: EMERGENCY MEDICINE | Admitting: INTERNAL MEDICINE
Payer: MEDICARE

## 2021-10-24 ENCOUNTER — OUTSIDE PLACE OF SERVICE (OUTPATIENT)
Dept: PULMONOLOGY | Facility: CLINIC | Age: 61
End: 2021-10-24
Payer: MEDICARE

## 2021-10-24 DIAGNOSIS — G93.1 ANOXIC BRAIN INJURY: ICD-10-CM

## 2021-10-24 DIAGNOSIS — R56.9 SEIZURE: Primary | ICD-10-CM

## 2021-10-24 DIAGNOSIS — R56.9 SEIZURE: ICD-10-CM

## 2021-10-24 DIAGNOSIS — Z93.1 PEG (PERCUTANEOUS ENDOSCOPIC GASTROSTOMY) STATUS: ICD-10-CM

## 2021-10-24 DIAGNOSIS — G25.3 POST HYPOXIC MYOCLONUS: ICD-10-CM

## 2021-10-24 DIAGNOSIS — J96.21 ACUTE ON CHRONIC RESPIRATORY FAILURE WITH HYPOXIA: ICD-10-CM

## 2021-10-24 PROBLEM — K21.9 GASTROESOPHAGEAL REFLUX DISEASE: Status: ACTIVE | Noted: 2021-10-24

## 2021-10-24 PROBLEM — I10 ESSENTIAL HYPERTENSION: Status: ACTIVE | Noted: 2021-10-24

## 2021-10-24 PROBLEM — N39.0 UTI (URINARY TRACT INFECTION): Status: ACTIVE | Noted: 2021-10-24

## 2021-10-24 PROBLEM — G93.6 CEREBRAL EDEMA: Status: ACTIVE | Noted: 2021-10-24

## 2021-10-24 LAB
ANION GAP SERPL CALC-SCNC: 11 MMOL/L (ref 8–16)
BACTERIA BLD CULT: NORMAL
BASOPHILS # BLD AUTO: 0.01 K/UL (ref 0–0.2)
BASOPHILS NFR BLD: 0.1 % (ref 0–1.9)
BUN SERPL-MCNC: 8 MG/DL (ref 6–20)
CALCIUM SERPL-MCNC: 9 MG/DL (ref 8.7–10.5)
CHLORIDE SERPL-SCNC: 104 MMOL/L (ref 95–110)
CO2 SERPL-SCNC: 21 MMOL/L (ref 23–29)
CREAT SERPL-MCNC: 0.5 MG/DL (ref 0.5–1.4)
DIFFERENTIAL METHOD: ABNORMAL
EOSINOPHIL # BLD AUTO: 0.2 K/UL (ref 0–0.5)
EOSINOPHIL NFR BLD: 2.6 % (ref 0–8)
ERYTHROCYTE [DISTWIDTH] IN BLOOD BY AUTOMATED COUNT: 17.2 % (ref 11.5–14.5)
EST. GFR  (AFRICAN AMERICAN): >60 ML/MIN/1.73 M^2
EST. GFR  (NON AFRICAN AMERICAN): >60 ML/MIN/1.73 M^2
GLUCOSE SERPL-MCNC: 98 MG/DL (ref 70–110)
HCT VFR BLD AUTO: 28.3 % (ref 37–48.5)
HGB BLD-MCNC: 8.9 G/DL (ref 12–16)
IMM GRANULOCYTES # BLD AUTO: 0.02 K/UL (ref 0–0.04)
IMM GRANULOCYTES NFR BLD AUTO: 0.2 % (ref 0–0.5)
LYMPHOCYTES # BLD AUTO: 1.1 K/UL (ref 1–4.8)
LYMPHOCYTES NFR BLD: 13.1 % (ref 18–48)
MCH RBC QN AUTO: 28.5 PG (ref 27–31)
MCHC RBC AUTO-ENTMCNC: 31.4 G/DL (ref 32–36)
MCV RBC AUTO: 91 FL (ref 82–98)
MONOCYTES # BLD AUTO: 0.7 K/UL (ref 0.3–1)
MONOCYTES NFR BLD: 8.1 % (ref 4–15)
NEUTROPHILS # BLD AUTO: 6.1 K/UL (ref 1.8–7.7)
NEUTROPHILS NFR BLD: 75.9 % (ref 38–73)
NRBC BLD-RTO: 0 /100 WBC
PLATELET # BLD AUTO: 290 K/UL (ref 150–450)
PMV BLD AUTO: 10.7 FL (ref 9.2–12.9)
POTASSIUM SERPL-SCNC: 3.6 MMOL/L (ref 3.5–5.1)
PROCALCITONIN SERPL IA-MCNC: 0.17 NG/ML
RBC # BLD AUTO: 3.12 M/UL (ref 4–5.4)
SARS-COV-2 RDRP RESP QL NAA+PROBE: NEGATIVE
SODIUM SERPL-SCNC: 136 MMOL/L (ref 136–145)
WBC # BLD AUTO: 8.01 K/UL (ref 3.9–12.7)

## 2021-10-24 PROCEDURE — 25000003 PHARM REV CODE 250: Performed by: NURSE PRACTITIONER

## 2021-10-24 PROCEDURE — 85025 COMPLETE CBC W/AUTO DIFF WBC: CPT | Performed by: EMERGENCY MEDICINE

## 2021-10-24 PROCEDURE — 63600175 PHARM REV CODE 636 W HCPCS: Performed by: NURSE PRACTITIONER

## 2021-10-24 PROCEDURE — 27000221 HC OXYGEN, UP TO 24 HOURS

## 2021-10-24 PROCEDURE — 94761 N-INVAS EAR/PLS OXIMETRY MLT: CPT

## 2021-10-24 PROCEDURE — 99900026 HC AIRWAY MAINTENANCE (STAT)

## 2021-10-24 PROCEDURE — 36415 COLL VENOUS BLD VENIPUNCTURE: CPT | Performed by: EMERGENCY MEDICINE

## 2021-10-24 PROCEDURE — 99223 1ST HOSP IP/OBS HIGH 75: CPT | Mod: S$GLB,,, | Performed by: INTERNAL MEDICINE

## 2021-10-24 PROCEDURE — 94640 AIRWAY INHALATION TREATMENT: CPT

## 2021-10-24 PROCEDURE — 95819 EEG AWAKE AND ASLEEP: CPT

## 2021-10-24 PROCEDURE — 99900035 HC TECH TIME PER 15 MIN (STAT)

## 2021-10-24 PROCEDURE — 36415 COLL VENOUS BLD VENIPUNCTURE: CPT | Performed by: INTERNAL MEDICINE

## 2021-10-24 PROCEDURE — 20000000 HC ICU ROOM

## 2021-10-24 PROCEDURE — 94003 VENT MGMT INPAT SUBQ DAY: CPT

## 2021-10-24 PROCEDURE — 84145 PROCALCITONIN (PCT): CPT | Performed by: INTERNAL MEDICINE

## 2021-10-24 PROCEDURE — 63600175 PHARM REV CODE 636 W HCPCS: Performed by: EMERGENCY MEDICINE

## 2021-10-24 PROCEDURE — 25000003 PHARM REV CODE 250: Performed by: INTERNAL MEDICINE

## 2021-10-24 PROCEDURE — 95816 EEG AWAKE AND DROWSY: CPT | Mod: 26,,, | Performed by: PSYCHIATRY & NEUROLOGY

## 2021-10-24 PROCEDURE — 80048 BASIC METABOLIC PNL TOTAL CA: CPT | Performed by: EMERGENCY MEDICINE

## 2021-10-24 PROCEDURE — 99223 PR INITIAL HOSPITAL CARE,LEVL III: ICD-10-PCS | Mod: S$GLB,,, | Performed by: INTERNAL MEDICINE

## 2021-10-24 PROCEDURE — 99285 EMERGENCY DEPT VISIT HI MDM: CPT | Mod: 25

## 2021-10-24 PROCEDURE — U0002 COVID-19 LAB TEST NON-CDC: HCPCS | Performed by: EMERGENCY MEDICINE

## 2021-10-24 PROCEDURE — 25000242 PHARM REV CODE 250 ALT 637 W/ HCPCS: Performed by: NURSE PRACTITIONER

## 2021-10-24 PROCEDURE — 96365 THER/PROPH/DIAG IV INF INIT: CPT

## 2021-10-24 PROCEDURE — 95816 PR EEG,W/AWAKE & DROWSY RECORD: ICD-10-PCS | Mod: 26,,, | Performed by: PSYCHIATRY & NEUROLOGY

## 2021-10-24 PROCEDURE — 94002 VENT MGMT INPAT INIT DAY: CPT

## 2021-10-24 PROCEDURE — 63600175 PHARM REV CODE 636 W HCPCS: Performed by: INTERNAL MEDICINE

## 2021-10-24 RX ORDER — ERGOCALCIFEROL 1.25 MG/1
50000 CAPSULE ORAL
Status: DISCONTINUED | OUTPATIENT
Start: 2021-10-24 | End: 2021-10-26 | Stop reason: HOSPADM

## 2021-10-24 RX ORDER — FENTANYL 25 UG/1
1 PATCH TRANSDERMAL
Status: DISCONTINUED | OUTPATIENT
Start: 2021-10-24 | End: 2021-10-26 | Stop reason: HOSPADM

## 2021-10-24 RX ORDER — ACETAMINOPHEN 325 MG/1
650 TABLET ORAL EVERY 4 HOURS PRN
Status: DISCONTINUED | OUTPATIENT
Start: 2021-10-24 | End: 2021-10-26 | Stop reason: HOSPADM

## 2021-10-24 RX ORDER — CLONAZEPAM 0.5 MG/1
0.5 TABLET ORAL 3 TIMES DAILY
Status: DISCONTINUED | OUTPATIENT
Start: 2021-10-24 | End: 2021-10-26 | Stop reason: HOSPADM

## 2021-10-24 RX ORDER — IPRATROPIUM BROMIDE AND ALBUTEROL SULFATE 2.5; .5 MG/3ML; MG/3ML
3 SOLUTION RESPIRATORY (INHALATION) EVERY 6 HOURS PRN
Status: DISCONTINUED | OUTPATIENT
Start: 2021-10-24 | End: 2021-10-26 | Stop reason: HOSPADM

## 2021-10-24 RX ORDER — L. ACIDOPHILUS/L.BULGARICUS 100MM CELL
1 GRANULES IN PACKET (EA) ORAL 2 TIMES DAILY
Status: DISCONTINUED | OUTPATIENT
Start: 2021-10-24 | End: 2021-10-26 | Stop reason: HOSPADM

## 2021-10-24 RX ORDER — ONDANSETRON 2 MG/ML
8 INJECTION INTRAMUSCULAR; INTRAVENOUS EVERY 8 HOURS PRN
Status: DISCONTINUED | OUTPATIENT
Start: 2021-10-24 | End: 2021-10-26 | Stop reason: HOSPADM

## 2021-10-24 RX ORDER — LEVETIRACETAM 15 MG/ML
1500 INJECTION INTRAVASCULAR
Status: DISCONTINUED | OUTPATIENT
Start: 2021-10-24 | End: 2021-10-24 | Stop reason: SDUPTHER

## 2021-10-24 RX ORDER — FERROUS SULFATE 300 MG/5ML
300 LIQUID (ML) ORAL EVERY OTHER DAY
Status: DISCONTINUED | OUTPATIENT
Start: 2021-10-24 | End: 2021-10-26 | Stop reason: HOSPADM

## 2021-10-24 RX ORDER — METOCLOPRAMIDE 10 MG/1
10 TABLET ORAL EVERY 8 HOURS
Status: ON HOLD | COMMUNITY
End: 2021-10-26 | Stop reason: HOSPADM

## 2021-10-24 RX ORDER — ENOXAPARIN SODIUM 100 MG/ML
40 INJECTION SUBCUTANEOUS EVERY 24 HOURS
Status: DISCONTINUED | OUTPATIENT
Start: 2021-10-24 | End: 2021-10-26 | Stop reason: HOSPADM

## 2021-10-24 RX ORDER — SODIUM CHLORIDE 0.9 % (FLUSH) 0.9 %
10 SYRINGE (ML) INJECTION
Status: DISCONTINUED | OUTPATIENT
Start: 2021-10-24 | End: 2021-10-26 | Stop reason: HOSPADM

## 2021-10-24 RX ORDER — LEVETIRACETAM 500 MG/5ML
1000 INJECTION, SOLUTION, CONCENTRATE INTRAVENOUS
Status: DISCONTINUED | OUTPATIENT
Start: 2021-10-24 | End: 2021-10-26 | Stop reason: HOSPADM

## 2021-10-24 RX ORDER — MIDAZOLAM HYDROCHLORIDE 5 MG/ML
5 INJECTION INTRAMUSCULAR; INTRAVENOUS
Status: ACTIVE | OUTPATIENT
Start: 2021-10-24 | End: 2021-10-24

## 2021-10-24 RX ORDER — ACETYLCYSTEINE 100 MG/ML
4 SOLUTION ORAL; RESPIRATORY (INHALATION) EVERY 8 HOURS
Status: DISCONTINUED | OUTPATIENT
Start: 2021-10-24 | End: 2021-10-24

## 2021-10-24 RX ORDER — FAMOTIDINE 10 MG/ML
20 INJECTION INTRAVENOUS EVERY 12 HOURS
Status: DISCONTINUED | OUTPATIENT
Start: 2021-10-24 | End: 2021-10-26 | Stop reason: HOSPADM

## 2021-10-24 RX ORDER — IPRATROPIUM BROMIDE AND ALBUTEROL SULFATE 2.5; .5 MG/3ML; MG/3ML
3 SOLUTION RESPIRATORY (INHALATION) EVERY 6 HOURS
Status: DISCONTINUED | OUTPATIENT
Start: 2021-10-24 | End: 2021-10-24

## 2021-10-24 RX ORDER — MIDODRINE HYDROCHLORIDE 5 MG/1
10 TABLET ORAL 3 TIMES DAILY
Status: DISCONTINUED | OUTPATIENT
Start: 2021-10-24 | End: 2021-10-26 | Stop reason: HOSPADM

## 2021-10-24 RX ORDER — IPRATROPIUM BROMIDE AND ALBUTEROL SULFATE 2.5; .5 MG/3ML; MG/3ML
3 SOLUTION RESPIRATORY (INHALATION) EVERY 8 HOURS
Status: DISCONTINUED | OUTPATIENT
Start: 2021-10-24 | End: 2021-10-24

## 2021-10-24 RX ORDER — POLYETHYLENE GLYCOL 3350 17 G/17G
17 POWDER, FOR SOLUTION ORAL DAILY PRN
Status: DISCONTINUED | OUTPATIENT
Start: 2021-10-24 | End: 2021-10-26 | Stop reason: HOSPADM

## 2021-10-24 RX ORDER — MIDAZOLAM HYDROCHLORIDE 1 MG/ML
2 INJECTION INTRAMUSCULAR; INTRAVENOUS
Status: DISCONTINUED | OUTPATIENT
Start: 2021-10-24 | End: 2021-10-26 | Stop reason: HOSPADM

## 2021-10-24 RX ORDER — MUPIROCIN 20 MG/G
OINTMENT TOPICAL 2 TIMES DAILY
Status: DISCONTINUED | OUTPATIENT
Start: 2021-10-24 | End: 2021-10-26 | Stop reason: HOSPADM

## 2021-10-24 RX ORDER — LEVETIRACETAM 15 MG/ML
1500 INJECTION INTRAVASCULAR
Status: COMPLETED | OUTPATIENT
Start: 2021-10-24 | End: 2021-10-24

## 2021-10-24 RX ADMIN — MIDODRINE HYDROCHLORIDE 10 MG: 5 TABLET ORAL at 10:10

## 2021-10-24 RX ADMIN — MUPIROCIN: 20 OINTMENT TOPICAL at 10:10

## 2021-10-24 RX ADMIN — LACTOBACILLUS ACIDOPHILUS / LACTOBACILLUS BULGARICUS 1 EACH: 100 MILLION CFU STRENGTH GRANULES at 09:10

## 2021-10-24 RX ADMIN — ACETYLCYSTEINE 4 ML: 100 INHALANT RESPIRATORY (INHALATION) at 08:10

## 2021-10-24 RX ADMIN — PIPERACILLIN AND TAZOBACTAM 4.5 G: 4; .5 INJECTION, POWDER, LYOPHILIZED, FOR SOLUTION INTRAVENOUS; PARENTERAL at 01:10

## 2021-10-24 RX ADMIN — LEVETIRACETAM INJECTION 1500 MG: 15 INJECTION INTRAVENOUS at 03:10

## 2021-10-24 RX ADMIN — ENOXAPARIN SODIUM 40 MG: 40 INJECTION, SOLUTION INTRAVENOUS; SUBCUTANEOUS at 05:10

## 2021-10-24 RX ADMIN — PIPERACILLIN AND TAZOBACTAM 4.5 G: 4; .5 INJECTION, POWDER, LYOPHILIZED, FOR SOLUTION INTRAVENOUS; PARENTERAL at 06:10

## 2021-10-24 RX ADMIN — CLONAZEPAM 0.5 MG: 0.5 TABLET ORAL at 09:10

## 2021-10-24 RX ADMIN — MUPIROCIN: 20 OINTMENT TOPICAL at 09:10

## 2021-10-24 RX ADMIN — MIDODRINE HYDROCHLORIDE 10 MG: 5 TABLET ORAL at 04:10

## 2021-10-24 RX ADMIN — FAMOTIDINE 20 MG: 10 INJECTION INTRAVENOUS at 10:10

## 2021-10-24 RX ADMIN — LEVETIRACETAM 1000 MG: 100 INJECTION, SOLUTION INTRAVENOUS at 04:10

## 2021-10-24 RX ADMIN — ERGOCALCIFEROL 50000 UNITS: 1.25 CAPSULE ORAL at 10:10

## 2021-10-24 RX ADMIN — MINERAL SUPPLEMENT IRON 300 MG / 5 ML STRENGTH LIQUID 100 PER BOX UNFLAVORED 300 MG: at 07:10

## 2021-10-24 RX ADMIN — FAMOTIDINE 20 MG: 10 INJECTION INTRAVENOUS at 09:10

## 2021-10-24 RX ADMIN — FENTANYL 1 PATCH: 25 PATCH, EXTENDED RELEASE TRANSDERMAL at 07:10

## 2021-10-24 RX ADMIN — IPRATROPIUM BROMIDE AND ALBUTEROL SULFATE 3 ML: .5; 2.5 SOLUTION RESPIRATORY (INHALATION) at 08:10

## 2021-10-24 RX ADMIN — LACTOBACILLUS ACIDOPHILUS / LACTOBACILLUS BULGARICUS 1 EACH: 100 MILLION CFU STRENGTH GRANULES at 10:10

## 2021-10-24 RX ADMIN — PIPERACILLIN AND TAZOBACTAM 4.5 G: 4; .5 INJECTION, POWDER, LYOPHILIZED, FOR SOLUTION INTRAVENOUS; PARENTERAL at 07:10

## 2021-10-24 RX ADMIN — MIDODRINE HYDROCHLORIDE 10 MG: 5 TABLET ORAL at 09:10

## 2021-10-25 PROBLEM — T83.9XXA DIFFICULT FOLEY CATHETER PLACEMENT: Status: ACTIVE | Noted: 2021-10-25

## 2021-10-25 PROBLEM — G25.3 POST HYPOXIC MYOCLONUS: Status: ACTIVE | Noted: 2021-10-24

## 2021-10-25 LAB
ALBUMIN SERPL BCP-MCNC: 2.3 G/DL (ref 3.5–5.2)
ALP SERPL-CCNC: 96 U/L (ref 55–135)
ALT SERPL W/O P-5'-P-CCNC: 34 U/L (ref 10–44)
ANION GAP SERPL CALC-SCNC: 11 MMOL/L (ref 8–16)
AST SERPL-CCNC: 36 U/L (ref 10–40)
BASOPHILS # BLD AUTO: 0.02 K/UL (ref 0–0.2)
BASOPHILS NFR BLD: 0.4 % (ref 0–1.9)
BILIRUB SERPL-MCNC: 0.6 MG/DL (ref 0.1–1)
BUN SERPL-MCNC: 10 MG/DL (ref 6–20)
CALCIUM SERPL-MCNC: 9 MG/DL (ref 8.7–10.5)
CHLORIDE SERPL-SCNC: 101 MMOL/L (ref 95–110)
CO2 SERPL-SCNC: 23 MMOL/L (ref 23–29)
CREAT SERPL-MCNC: 0.5 MG/DL (ref 0.5–1.4)
DIFFERENTIAL METHOD: ABNORMAL
EOSINOPHIL # BLD AUTO: 0.2 K/UL (ref 0–0.5)
EOSINOPHIL NFR BLD: 4.8 % (ref 0–8)
ERYTHROCYTE [DISTWIDTH] IN BLOOD BY AUTOMATED COUNT: 17.6 % (ref 11.5–14.5)
EST. GFR  (AFRICAN AMERICAN): >60 ML/MIN/1.73 M^2
EST. GFR  (NON AFRICAN AMERICAN): >60 ML/MIN/1.73 M^2
GLUCOSE SERPL-MCNC: 70 MG/DL (ref 70–110)
HCT VFR BLD AUTO: 25.7 % (ref 37–48.5)
HGB BLD-MCNC: 7.8 G/DL (ref 12–16)
IMM GRANULOCYTES # BLD AUTO: 0.02 K/UL (ref 0–0.04)
IMM GRANULOCYTES NFR BLD AUTO: 0.4 % (ref 0–0.5)
LYMPHOCYTES # BLD AUTO: 1.4 K/UL (ref 1–4.8)
LYMPHOCYTES NFR BLD: 31.4 % (ref 18–48)
MAGNESIUM SERPL-MCNC: 1.3 MG/DL (ref 1.6–2.6)
MAGNESIUM SERPL-MCNC: 1.4 MG/DL (ref 1.6–2.6)
MCH RBC QN AUTO: 27.8 PG (ref 27–31)
MCHC RBC AUTO-ENTMCNC: 30.4 G/DL (ref 32–36)
MCV RBC AUTO: 92 FL (ref 82–98)
MONOCYTES # BLD AUTO: 0.5 K/UL (ref 0.3–1)
MONOCYTES NFR BLD: 11 % (ref 4–15)
NEUTROPHILS # BLD AUTO: 2.4 K/UL (ref 1.8–7.7)
NEUTROPHILS NFR BLD: 52 % (ref 38–73)
NRBC BLD-RTO: 0 /100 WBC
PLATELET # BLD AUTO: 319 K/UL (ref 150–450)
PLATELET BLD QL SMEAR: ABNORMAL
PMV BLD AUTO: 10.4 FL (ref 9.2–12.9)
POTASSIUM SERPL-SCNC: 3.2 MMOL/L (ref 3.5–5.1)
PROT SERPL-MCNC: 6.2 G/DL (ref 6–8.4)
RBC # BLD AUTO: 2.81 M/UL (ref 4–5.4)
SODIUM SERPL-SCNC: 135 MMOL/L (ref 136–145)
WBC # BLD AUTO: 4.55 K/UL (ref 3.9–12.7)

## 2021-10-25 PROCEDURE — 94640 AIRWAY INHALATION TREATMENT: CPT

## 2021-10-25 PROCEDURE — 63600175 PHARM REV CODE 636 W HCPCS: Performed by: NURSE PRACTITIONER

## 2021-10-25 PROCEDURE — 20000000 HC ICU ROOM

## 2021-10-25 PROCEDURE — 36415 COLL VENOUS BLD VENIPUNCTURE: CPT | Performed by: INTERNAL MEDICINE

## 2021-10-25 PROCEDURE — 27000221 HC OXYGEN, UP TO 24 HOURS

## 2021-10-25 PROCEDURE — 85025 COMPLETE CBC W/AUTO DIFF WBC: CPT | Performed by: NURSE PRACTITIONER

## 2021-10-25 PROCEDURE — 51702 INSERT TEMP BLADDER CATH: CPT

## 2021-10-25 PROCEDURE — 27000207 HC ISOLATION

## 2021-10-25 PROCEDURE — 25000242 PHARM REV CODE 250 ALT 637 W/ HCPCS: Performed by: INTERNAL MEDICINE

## 2021-10-25 PROCEDURE — 99233 SBSQ HOSP IP/OBS HIGH 50: CPT | Mod: ,,, | Performed by: INTERNAL MEDICINE

## 2021-10-25 PROCEDURE — 63600175 PHARM REV CODE 636 W HCPCS: Performed by: INTERNAL MEDICINE

## 2021-10-25 PROCEDURE — 25000003 PHARM REV CODE 250: Performed by: INTERNAL MEDICINE

## 2021-10-25 PROCEDURE — 99900026 HC AIRWAY MAINTENANCE (STAT)

## 2021-10-25 PROCEDURE — 80053 COMPREHEN METABOLIC PANEL: CPT | Performed by: NURSE PRACTITIONER

## 2021-10-25 PROCEDURE — 25000003 PHARM REV CODE 250: Performed by: NURSE PRACTITIONER

## 2021-10-25 PROCEDURE — 99233 PR SUBSEQUENT HOSPITAL CARE,LEVL III: ICD-10-PCS | Mod: ,,, | Performed by: INTERNAL MEDICINE

## 2021-10-25 PROCEDURE — 27201109 HC SYSTEM FECAL MANAGEMENT

## 2021-10-25 PROCEDURE — 83735 ASSAY OF MAGNESIUM: CPT | Mod: 91 | Performed by: INTERNAL MEDICINE

## 2021-10-25 PROCEDURE — 94003 VENT MGMT INPAT SUBQ DAY: CPT

## 2021-10-25 PROCEDURE — 94761 N-INVAS EAR/PLS OXIMETRY MLT: CPT

## 2021-10-25 PROCEDURE — 99900035 HC TECH TIME PER 15 MIN (STAT)

## 2021-10-25 RX ORDER — SODIUM,POTASSIUM PHOSPHATES 280-250MG
2 POWDER IN PACKET (EA) ORAL
Status: DISCONTINUED | OUTPATIENT
Start: 2021-10-25 | End: 2021-10-26 | Stop reason: HOSPADM

## 2021-10-25 RX ORDER — LANOLIN ALCOHOL/MO/W.PET/CERES
800 CREAM (GRAM) TOPICAL
Status: DISCONTINUED | OUTPATIENT
Start: 2021-10-25 | End: 2021-10-26 | Stop reason: HOSPADM

## 2021-10-25 RX ORDER — POTASSIUM CHLORIDE 20 MEQ/1
40 TABLET, EXTENDED RELEASE ORAL 2 TIMES DAILY
Status: DISCONTINUED | OUTPATIENT
Start: 2021-10-25 | End: 2021-10-25 | Stop reason: SDUPTHER

## 2021-10-25 RX ADMIN — FAMOTIDINE 20 MG: 10 INJECTION INTRAVENOUS at 09:10

## 2021-10-25 RX ADMIN — MIDODRINE HYDROCHLORIDE 10 MG: 5 TABLET ORAL at 09:10

## 2021-10-25 RX ADMIN — ENOXAPARIN SODIUM 40 MG: 40 INJECTION, SOLUTION INTRAVENOUS; SUBCUTANEOUS at 05:10

## 2021-10-25 RX ADMIN — PIPERACILLIN AND TAZOBACTAM 4.5 G: 4; .5 INJECTION, POWDER, LYOPHILIZED, FOR SOLUTION INTRAVENOUS; PARENTERAL at 12:10

## 2021-10-25 RX ADMIN — POTASSIUM BICARBONATE 40 MEQ: 391 TABLET, EFFERVESCENT ORAL at 01:10

## 2021-10-25 RX ADMIN — CLONAZEPAM 0.5 MG: 0.5 TABLET ORAL at 09:10

## 2021-10-25 RX ADMIN — MUPIROCIN: 20 OINTMENT TOPICAL at 09:10

## 2021-10-25 RX ADMIN — IPRATROPIUM BROMIDE AND ALBUTEROL SULFATE 3 ML: .5; 2.5 SOLUTION RESPIRATORY (INHALATION) at 06:10

## 2021-10-25 RX ADMIN — POTASSIUM BICARBONATE 40 MEQ: 391 TABLET, EFFERVESCENT ORAL at 09:10

## 2021-10-25 RX ADMIN — LACTOBACILLUS ACIDOPHILUS / LACTOBACILLUS BULGARICUS 1 EACH: 100 MILLION CFU STRENGTH GRANULES at 09:10

## 2021-10-25 RX ADMIN — MIDODRINE HYDROCHLORIDE 10 MG: 5 TABLET ORAL at 02:10

## 2021-10-25 RX ADMIN — MAGNESIUM OXIDE TAB 400 MG (241.3 MG ELEMENTAL MG) 800 MG: 400 (241.3 MG) TAB at 12:10

## 2021-10-25 RX ADMIN — LEVETIRACETAM 1000 MG: 100 INJECTION, SOLUTION INTRAVENOUS at 04:10

## 2021-10-25 RX ADMIN — PIPERACILLIN AND TAZOBACTAM 4.5 G: 4; .5 INJECTION, POWDER, LYOPHILIZED, FOR SOLUTION INTRAVENOUS; PARENTERAL at 06:10

## 2021-10-25 RX ADMIN — CLONAZEPAM 0.5 MG: 0.5 TABLET ORAL at 02:10

## 2021-10-25 RX ADMIN — LEVETIRACETAM 1000 MG: 100 INJECTION, SOLUTION INTRAVENOUS at 03:10

## 2021-10-26 VITALS
RESPIRATION RATE: 18 BRPM | BODY MASS INDEX: 27.18 KG/M2 | SYSTOLIC BLOOD PRESSURE: 122 MMHG | OXYGEN SATURATION: 88 % | TEMPERATURE: 98 F | WEIGHT: 138.44 LBS | HEART RATE: 66 BPM | HEIGHT: 60 IN | DIASTOLIC BLOOD PRESSURE: 76 MMHG

## 2021-10-26 PROBLEM — N39.0 UTI (URINARY TRACT INFECTION): Status: RESOLVED | Noted: 2021-10-24 | Resolved: 2021-10-26

## 2021-10-26 LAB
ALBUMIN SERPL BCP-MCNC: 2.3 G/DL (ref 3.5–5.2)
ALP SERPL-CCNC: 105 U/L (ref 55–135)
ALT SERPL W/O P-5'-P-CCNC: 33 U/L (ref 10–44)
ANION GAP SERPL CALC-SCNC: 10 MMOL/L (ref 8–16)
AST SERPL-CCNC: 31 U/L (ref 10–40)
BASOPHILS # BLD AUTO: 0.02 K/UL (ref 0–0.2)
BASOPHILS NFR BLD: 0.3 % (ref 0–1.9)
BILIRUB SERPL-MCNC: 0.4 MG/DL (ref 0.1–1)
BUN SERPL-MCNC: 11 MG/DL (ref 6–20)
CALCIUM SERPL-MCNC: 8.9 MG/DL (ref 8.7–10.5)
CHLORIDE SERPL-SCNC: 98 MMOL/L (ref 95–110)
CO2 SERPL-SCNC: 23 MMOL/L (ref 23–29)
CREAT SERPL-MCNC: 0.5 MG/DL (ref 0.5–1.4)
DIFFERENTIAL METHOD: ABNORMAL
EOSINOPHIL # BLD AUTO: 0.3 K/UL (ref 0–0.5)
EOSINOPHIL NFR BLD: 4.3 % (ref 0–8)
ERYTHROCYTE [DISTWIDTH] IN BLOOD BY AUTOMATED COUNT: 18 % (ref 11.5–14.5)
EST. GFR  (AFRICAN AMERICAN): >60 ML/MIN/1.73 M^2
EST. GFR  (NON AFRICAN AMERICAN): >60 ML/MIN/1.73 M^2
GLUCOSE SERPL-MCNC: 90 MG/DL (ref 70–110)
HCT VFR BLD AUTO: 28.8 % (ref 37–48.5)
HGB BLD-MCNC: 8.8 G/DL (ref 12–16)
IMM GRANULOCYTES # BLD AUTO: 0.01 K/UL (ref 0–0.04)
IMM GRANULOCYTES NFR BLD AUTO: 0.2 % (ref 0–0.5)
LYMPHOCYTES # BLD AUTO: 1.4 K/UL (ref 1–4.8)
LYMPHOCYTES NFR BLD: 21.6 % (ref 18–48)
MCH RBC QN AUTO: 28 PG (ref 27–31)
MCHC RBC AUTO-ENTMCNC: 30.6 G/DL (ref 32–36)
MCV RBC AUTO: 92 FL (ref 82–98)
MONOCYTES # BLD AUTO: 0.6 K/UL (ref 0.3–1)
MONOCYTES NFR BLD: 8.9 % (ref 4–15)
NEUTROPHILS # BLD AUTO: 4.2 K/UL (ref 1.8–7.7)
NEUTROPHILS NFR BLD: 64.7 % (ref 38–73)
NRBC BLD-RTO: 0 /100 WBC
PLATELET # BLD AUTO: 423 K/UL (ref 150–450)
PMV BLD AUTO: 9.8 FL (ref 9.2–12.9)
POTASSIUM SERPL-SCNC: 4.3 MMOL/L (ref 3.5–5.1)
PROT SERPL-MCNC: 6.5 G/DL (ref 6–8.4)
RBC # BLD AUTO: 3.14 M/UL (ref 4–5.4)
SODIUM SERPL-SCNC: 131 MMOL/L (ref 136–145)
WBC # BLD AUTO: 6.54 K/UL (ref 3.9–12.7)

## 2021-10-26 PROCEDURE — 36415 COLL VENOUS BLD VENIPUNCTURE: CPT | Performed by: NURSE PRACTITIONER

## 2021-10-26 PROCEDURE — 25000003 PHARM REV CODE 250: Performed by: INTERNAL MEDICINE

## 2021-10-26 PROCEDURE — 94761 N-INVAS EAR/PLS OXIMETRY MLT: CPT

## 2021-10-26 PROCEDURE — 27000221 HC OXYGEN, UP TO 24 HOURS

## 2021-10-26 PROCEDURE — 99232 SBSQ HOSP IP/OBS MODERATE 35: CPT | Mod: ,,, | Performed by: INTERNAL MEDICINE

## 2021-10-26 PROCEDURE — 94003 VENT MGMT INPAT SUBQ DAY: CPT

## 2021-10-26 PROCEDURE — 80053 COMPREHEN METABOLIC PANEL: CPT | Performed by: NURSE PRACTITIONER

## 2021-10-26 PROCEDURE — 99232 PR SUBSEQUENT HOSPITAL CARE,LEVL II: ICD-10-PCS | Mod: ,,, | Performed by: INTERNAL MEDICINE

## 2021-10-26 PROCEDURE — 25000003 PHARM REV CODE 250: Performed by: NURSE PRACTITIONER

## 2021-10-26 PROCEDURE — 99900026 HC AIRWAY MAINTENANCE (STAT)

## 2021-10-26 PROCEDURE — 63600175 PHARM REV CODE 636 W HCPCS: Performed by: INTERNAL MEDICINE

## 2021-10-26 PROCEDURE — 85025 COMPLETE CBC W/AUTO DIFF WBC: CPT | Performed by: NURSE PRACTITIONER

## 2021-10-26 PROCEDURE — 99900035 HC TECH TIME PER 15 MIN (STAT)

## 2021-10-26 RX ORDER — CLONAZEPAM 0.5 MG/1
0.5 TABLET ORAL 3 TIMES DAILY
Qty: 6 TABLET | Refills: 0 | OUTPATIENT
Start: 2021-10-26 | End: 2021-10-26

## 2021-10-26 RX ORDER — L. ACIDOPHILUS/L.BULGARICUS 100MM CELL
1 GRANULES IN PACKET (EA) ORAL 2 TIMES DAILY
Qty: 60 PACKET | Refills: 0
Start: 2021-10-26 | End: 2021-11-25

## 2021-10-26 RX ORDER — CLONAZEPAM 0.5 MG/1
0.5 TABLET ORAL 3 TIMES DAILY
Qty: 6 TABLET | Refills: 0 | OUTPATIENT
Start: 2021-10-26 | End: 2022-02-02 | Stop reason: SDUPTHER

## 2021-10-26 RX ORDER — CLONAZEPAM 0.5 MG/1
0.5 TABLET ORAL 3 TIMES DAILY
Qty: 6 TABLET | Refills: 0 | Status: SHIPPED | OUTPATIENT
Start: 2021-10-26 | End: 2021-10-26

## 2021-10-26 RX ADMIN — MIDODRINE HYDROCHLORIDE 10 MG: 5 TABLET ORAL at 08:10

## 2021-10-26 RX ADMIN — POTASSIUM BICARBONATE 40 MEQ: 391 TABLET, EFFERVESCENT ORAL at 08:10

## 2021-10-26 RX ADMIN — PIPERACILLIN AND TAZOBACTAM 4.5 G: 4; .5 INJECTION, POWDER, LYOPHILIZED, FOR SOLUTION INTRAVENOUS; PARENTERAL at 02:10

## 2021-10-26 RX ADMIN — CLONAZEPAM 0.5 MG: 0.5 TABLET ORAL at 08:10

## 2021-10-26 RX ADMIN — MIDODRINE HYDROCHLORIDE 10 MG: 5 TABLET ORAL at 02:10

## 2021-10-26 RX ADMIN — MAGNESIUM OXIDE TAB 400 MG (241.3 MG ELEMENTAL MG) 800 MG: 400 (241.3 MG) TAB at 05:10

## 2021-10-26 RX ADMIN — LACTOBACILLUS ACIDOPHILUS / LACTOBACILLUS BULGARICUS 1 EACH: 100 MILLION CFU STRENGTH GRANULES at 08:10

## 2021-10-26 RX ADMIN — LEVETIRACETAM 1000 MG: 100 INJECTION, SOLUTION INTRAVENOUS at 03:10

## 2021-10-26 RX ADMIN — FAMOTIDINE 20 MG: 10 INJECTION INTRAVENOUS at 08:10

## 2021-10-26 RX ADMIN — LEVETIRACETAM 1000 MG: 100 INJECTION, SOLUTION INTRAVENOUS at 02:10

## 2021-10-26 RX ADMIN — MAGNESIUM OXIDE TAB 400 MG (241.3 MG ELEMENTAL MG) 800 MG: 400 (241.3 MG) TAB at 09:10

## 2021-10-26 RX ADMIN — CLONAZEPAM 0.5 MG: 0.5 TABLET ORAL at 03:10

## 2021-10-26 RX ADMIN — MUPIROCIN: 20 OINTMENT TOPICAL at 08:10

## 2021-10-26 RX ADMIN — MINERAL SUPPLEMENT IRON 300 MG / 5 ML STRENGTH LIQUID 100 PER BOX UNFLAVORED 300 MG: at 08:10

## 2021-10-26 RX ADMIN — MAGNESIUM OXIDE TAB 400 MG (241.3 MG ELEMENTAL MG) 800 MG: 400 (241.3 MG) TAB at 02:10

## 2021-12-26 ENCOUNTER — HOSPITAL ENCOUNTER (OUTPATIENT)
Facility: HOSPITAL | Age: 61
LOS: 1 days | Discharge: NURSING, REHAB OR LONG-TERM CARE | End: 2021-12-27
Attending: EMERGENCY MEDICINE | Admitting: HOSPITALIST
Payer: MEDICARE
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
## 2021-12-26 DIAGNOSIS — J18.9 HOSPITAL-ACQUIRED PNEUMONIA: ICD-10-CM

## 2021-12-26 DIAGNOSIS — N39.0 UTI (URINARY TRACT INFECTION): Primary | ICD-10-CM

## 2021-12-26 PROCEDURE — 99285 EMERGENCY DEPT VISIT HI MDM: CPT | Mod: 25

## 2021-12-26 PROCEDURE — 96367 TX/PROPH/DG ADDL SEQ IV INF: CPT

## 2021-12-26 PROCEDURE — 94760 N-INVAS EAR/PLS OXIMETRY 1: CPT

## 2021-12-26 PROCEDURE — 94002 VENT MGMT INPAT INIT DAY: CPT

## 2021-12-26 PROCEDURE — 96365 THER/PROPH/DIAG IV INF INIT: CPT

## 2021-12-26 NOTE — Clinical Note
Diagnosis: Hospital-acquired pneumonia [[id number]]   Admitting Provider:: RAQUEL WILLOUGHBY [[id number]]   Future Attending Provider: RAQUEL WILLOUGHBY [[id number]]   Reason for IP Medical Treatment  (Clinical interventions that can only be accomplished in the IP setting? ) :: iv abx, vent management   Estimated Length of Stay:: 2 midnights   I certify that Inpatient services for greater than or equal to 2 midnights are medically necessary:: Yes   Plans for Post-Acute care--if anticipated (pick the single best option):: A. No post acute care anticipated at this time   Special Needs:: No Special Needs [1]

## 2021-12-27 VITALS
BODY MASS INDEX: 27.09 KG/M2 | OXYGEN SATURATION: 92 % | DIASTOLIC BLOOD PRESSURE: 90 MMHG | SYSTOLIC BLOOD PRESSURE: 137 MMHG | HEART RATE: 103 BPM | HEIGHT: 60 IN | RESPIRATION RATE: 20 BRPM | WEIGHT: 138 LBS | TEMPERATURE: 99 F

## 2021-12-27 PROBLEM — T14.8XXA DEEP TISSUE INJURY: Status: ACTIVE | Noted: 2021-03-16

## 2021-12-27 PROBLEM — S09.0XXA: Status: ACTIVE | Noted: 2020-07-16

## 2021-12-27 PROBLEM — K74.60 CIRRHOSIS OF LIVER: Status: ACTIVE | Noted: 2021-12-27

## 2021-12-27 PROBLEM — A04.72 COLITIS DUE TO CLOSTRIDIUM DIFFICILE: Status: RESOLVED | Noted: 2021-05-10 | Resolved: 2021-12-27

## 2021-12-27 PROBLEM — Z78.9 ON TUBE FEEDING DIET: Status: ACTIVE | Noted: 2020-11-13

## 2021-12-27 PROBLEM — Z86.711 HX PULMONARY EMBOLISM: Status: ACTIVE | Noted: 2020-11-13

## 2021-12-27 PROBLEM — M19.90 OSTEOARTHRITIS: Status: ACTIVE | Noted: 2019-01-28

## 2021-12-27 PROBLEM — J45.909 ASTHMA: Status: ACTIVE | Noted: 2019-01-17

## 2021-12-27 PROBLEM — J96.01 ACUTE HYPOXEMIC RESPIRATORY FAILURE: Status: RESOLVED | Noted: 2021-10-16 | Resolved: 2021-12-27

## 2021-12-27 PROBLEM — A41.9 SEVERE SEPSIS: Status: RESOLVED | Noted: 2021-04-22 | Resolved: 2021-12-27

## 2021-12-27 PROBLEM — R65.20 SEVERE SEPSIS: Status: RESOLVED | Noted: 2021-04-22 | Resolved: 2021-12-27

## 2021-12-27 PROBLEM — J96.21 ACUTE ON CHRONIC RESPIRATORY FAILURE WITH HYPOXIA: Status: RESOLVED | Noted: 2021-04-22 | Resolved: 2021-12-27

## 2021-12-27 PROBLEM — K08.89 NON-RESTORABLE TOOTH: Status: ACTIVE | Noted: 2020-07-16

## 2021-12-27 PROBLEM — J18.9 HEALTHCARE-ASSOCIATED PNEUMONIA: Status: RESOLVED | Noted: 2021-12-27 | Resolved: 2021-12-27

## 2021-12-27 PROBLEM — J18.9 HOSPITAL-ACQUIRED PNEUMONIA: Status: ACTIVE | Noted: 2021-12-27

## 2021-12-27 PROBLEM — L89.324 PRESSURE INJURY OF LEFT BUTTOCK, STAGE 4: Status: ACTIVE | Noted: 2020-11-04
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
## 2021-12-27 PROBLEM — D50.9 IRON DEFICIENCY ANEMIA: Status: ACTIVE | Noted: 2020-11-13

## 2021-12-27 PROBLEM — B95.2 BACTEREMIA DUE TO ENTEROCOCCUS: Status: RESOLVED | Noted: 2021-05-10 | Resolved: 2021-12-27

## 2021-12-27 PROBLEM — N39.0 UTI (URINARY TRACT INFECTION): Status: ACTIVE | Noted: 2021-12-27

## 2021-12-27 PROBLEM — R78.81 BACTEREMIA: Status: RESOLVED | Noted: 2021-10-19 | Resolved: 2021-12-27

## 2021-12-27 PROBLEM — N30.00 ACUTE CYSTITIS WITHOUT HEMATURIA: Status: RESOLVED | Noted: 2021-04-22 | Resolved: 2021-12-27

## 2021-12-27 PROBLEM — R78.81 BACTEREMIA DUE TO ENTEROCOCCUS: Status: RESOLVED | Noted: 2021-05-10 | Resolved: 2021-12-27

## 2021-12-27 PROBLEM — K02.9 DENTAL CARIES: Status: ACTIVE | Noted: 2020-07-16

## 2021-12-27 LAB
ALBUMIN SERPL BCP-MCNC: 3 G/DL (ref 3.5–5.2)
ALP SERPL-CCNC: 187 U/L (ref 55–135)
ALT SERPL W/O P-5'-P-CCNC: 64 U/L (ref 10–44)
ANION GAP SERPL CALC-SCNC: 12 MMOL/L (ref 8–16)
AST SERPL-CCNC: 34 U/L (ref 10–40)
BACTERIA #/AREA URNS HPF: ABNORMAL /HPF
BASOPHILS # BLD AUTO: 0.01 K/UL (ref 0–0.2)
BASOPHILS NFR BLD: 0.1 % (ref 0–1.9)
BILIRUB SERPL-MCNC: 1 MG/DL (ref 0.1–1)
BILIRUB UR QL STRIP: NEGATIVE
BNP SERPL-MCNC: 19 PG/ML (ref 0–99)
BUN SERPL-MCNC: 17 MG/DL (ref 6–20)
CALCIUM SERPL-MCNC: 10 MG/DL (ref 8.7–10.5)
CHLORIDE SERPL-SCNC: 97 MMOL/L (ref 95–110)
CLARITY UR: CLEAR
CO2 SERPL-SCNC: 27 MMOL/L (ref 23–29)
COLOR UR: YELLOW
CREAT SERPL-MCNC: 0.5 MG/DL (ref 0.5–1.4)
DIFFERENTIAL METHOD: ABNORMAL
EOSINOPHIL # BLD AUTO: 0.2 K/UL (ref 0–0.5)
EOSINOPHIL NFR BLD: 2.4 % (ref 0–8)
ERYTHROCYTE [DISTWIDTH] IN BLOOD BY AUTOMATED COUNT: 16.7 % (ref 11.5–14.5)
EST. GFR  (AFRICAN AMERICAN): >60 ML/MIN/1.73 M^2
EST. GFR  (NON AFRICAN AMERICAN): >60 ML/MIN/1.73 M^2
GLUCOSE SERPL-MCNC: 99 MG/DL (ref 70–110)
GLUCOSE UR QL STRIP: NEGATIVE
HCT VFR BLD AUTO: 35.4 % (ref 37–48.5)
HGB BLD-MCNC: 11.2 G/DL (ref 12–16)
HGB UR QL STRIP: ABNORMAL
IMM GRANULOCYTES # BLD AUTO: 0.02 K/UL (ref 0–0.04)
IMM GRANULOCYTES NFR BLD AUTO: 0.3 % (ref 0–0.5)
KETONES UR QL STRIP: NEGATIVE
LACTATE SERPL-SCNC: 0.8 MMOL/L (ref 0.5–2.2)
LACTATE SERPL-SCNC: 1.3 MMOL/L (ref 0.5–2.2)
LEUKOCYTE ESTERASE UR QL STRIP: ABNORMAL
LYMPHOCYTES # BLD AUTO: 0.9 K/UL (ref 1–4.8)
LYMPHOCYTES NFR BLD: 12.1 % (ref 18–48)
MCH RBC QN AUTO: 28.4 PG (ref 27–31)
MCHC RBC AUTO-ENTMCNC: 31.6 G/DL (ref 32–36)
MCV RBC AUTO: 90 FL (ref 82–98)
MICROSCOPIC COMMENT: ABNORMAL
MONOCYTES # BLD AUTO: 0.3 K/UL (ref 0.3–1)
MONOCYTES NFR BLD: 4 % (ref 4–15)
NEUTROPHILS # BLD AUTO: 6.3 K/UL (ref 1.8–7.7)
NEUTROPHILS NFR BLD: 81.1 % (ref 38–73)
NITRITE UR QL STRIP: NEGATIVE
NRBC BLD-RTO: 0 /100 WBC
PH UR STRIP: 7 [PH] (ref 5–8)
PLATELET # BLD AUTO: 321 K/UL (ref 150–450)
PMV BLD AUTO: 10.6 FL (ref 9.2–12.9)
POTASSIUM SERPL-SCNC: 4.3 MMOL/L (ref 3.5–5.1)
PROCALCITONIN SERPL IA-MCNC: 0.12 NG/ML
PROT SERPL-MCNC: 8.6 G/DL (ref 6–8.4)
PROT UR QL STRIP: NEGATIVE
RBC # BLD AUTO: 3.95 M/UL (ref 4–5.4)
RBC #/AREA URNS HPF: 2 /HPF (ref 0–4)
SARS-COV-2 RDRP RESP QL NAA+PROBE: NEGATIVE
SODIUM SERPL-SCNC: 136 MMOL/L (ref 136–145)
SP GR UR STRIP: <=1.005 (ref 1–1.03)
SQUAMOUS #/AREA URNS HPF: 17 /HPF
URN SPEC COLLECT METH UR: ABNORMAL
UROBILINOGEN UR STRIP-ACNC: NEGATIVE EU/DL
WBC # BLD AUTO: 7.76 K/UL (ref 3.9–12.7)
WBC #/AREA URNS HPF: 13 /HPF (ref 0–5)

## 2021-12-27 PROCEDURE — G0378 HOSPITAL OBSERVATION PER HR: HCPCS

## 2021-12-27 PROCEDURE — 36415 COLL VENOUS BLD VENIPUNCTURE: CPT | Performed by: EMERGENCY MEDICINE

## 2021-12-27 PROCEDURE — 63600175 PHARM REV CODE 636 W HCPCS: Performed by: EMERGENCY MEDICINE

## 2021-12-27 PROCEDURE — 84145 PROCALCITONIN (PCT): CPT | Performed by: EMERGENCY MEDICINE

## 2021-12-27 PROCEDURE — 87040 BLOOD CULTURE FOR BACTERIA: CPT | Performed by: EMERGENCY MEDICINE

## 2021-12-27 PROCEDURE — 25000003 PHARM REV CODE 250: Performed by: EMERGENCY MEDICINE

## 2021-12-27 PROCEDURE — 94003 VENT MGMT INPAT SUBQ DAY: CPT

## 2021-12-27 PROCEDURE — U0002 COVID-19 LAB TEST NON-CDC: HCPCS | Performed by: STUDENT IN AN ORGANIZED HEALTH CARE EDUCATION/TRAINING PROGRAM

## 2021-12-27 PROCEDURE — 83605 ASSAY OF LACTIC ACID: CPT | Mod: 91 | Performed by: EMERGENCY MEDICINE

## 2021-12-27 PROCEDURE — 99900035 HC TECH TIME PER 15 MIN (STAT)

## 2021-12-27 PROCEDURE — 83880 ASSAY OF NATRIURETIC PEPTIDE: CPT | Performed by: EMERGENCY MEDICINE

## 2021-12-27 PROCEDURE — 85025 COMPLETE CBC W/AUTO DIFF WBC: CPT | Performed by: EMERGENCY MEDICINE

## 2021-12-27 PROCEDURE — 99900026 HC AIRWAY MAINTENANCE (STAT)

## 2021-12-27 PROCEDURE — 27000221 HC OXYGEN, UP TO 24 HOURS

## 2021-12-27 PROCEDURE — 94761 N-INVAS EAR/PLS OXIMETRY MLT: CPT

## 2021-12-27 PROCEDURE — 80053 COMPREHEN METABOLIC PANEL: CPT | Performed by: EMERGENCY MEDICINE

## 2021-12-27 PROCEDURE — 87086 URINE CULTURE/COLONY COUNT: CPT | Performed by: EMERGENCY MEDICINE

## 2021-12-27 PROCEDURE — 81000 URINALYSIS NONAUTO W/SCOPE: CPT | Performed by: EMERGENCY MEDICINE

## 2021-12-27 RX ORDER — SULFAMETHOXAZOLE AND TRIMETHOPRIM 800; 160 MG/1; MG/1
1 TABLET ORAL 2 TIMES DAILY
Qty: 10 TABLET | Refills: 5 | Status: ON HOLD
Start: 2021-12-27 | End: 2022-01-21 | Stop reason: HOSPADM

## 2021-12-27 RX ORDER — ONDANSETRON 2 MG/ML
4 INJECTION INTRAMUSCULAR; INTRAVENOUS EVERY 6 HOURS PRN
Status: CANCELLED | OUTPATIENT
Start: 2021-12-27

## 2021-12-27 RX ORDER — NALOXONE HCL 0.4 MG/ML
0.02 VIAL (ML) INJECTION
Status: CANCELLED | OUTPATIENT
Start: 2021-12-27

## 2021-12-27 RX ORDER — IBUPROFEN 200 MG
16 TABLET ORAL
Status: CANCELLED | OUTPATIENT
Start: 2021-12-27

## 2021-12-27 RX ORDER — SODIUM CHLORIDE 9 MG/ML
INJECTION, SOLUTION INTRAVENOUS CONTINUOUS
Status: CANCELLED | OUTPATIENT
Start: 2021-12-27

## 2021-12-27 RX ORDER — ACETAMINOPHEN 500 MG
1000 TABLET ORAL EVERY 6 HOURS PRN
Status: CANCELLED | OUTPATIENT
Start: 2021-12-27

## 2021-12-27 RX ORDER — IPRATROPIUM BROMIDE AND ALBUTEROL SULFATE 2.5; .5 MG/3ML; MG/3ML
3 SOLUTION RESPIRATORY (INHALATION) EVERY 6 HOURS
Status: CANCELLED | OUTPATIENT
Start: 2021-12-27

## 2021-12-27 RX ORDER — IBUPROFEN 200 MG
24 TABLET ORAL
Status: CANCELLED | OUTPATIENT
Start: 2021-12-27

## 2021-12-27 RX ORDER — SODIUM CHLORIDE 9 MG/ML
INJECTION, SOLUTION INTRAVENOUS CONTINUOUS
Status: DISCONTINUED | OUTPATIENT
Start: 2021-12-27 | End: 2021-12-27

## 2021-12-27 RX ORDER — MAG HYDROX/ALUMINUM HYD/SIMETH 200-200-20
30 SUSPENSION, ORAL (FINAL DOSE FORM) ORAL 4 TIMES DAILY PRN
Status: CANCELLED | OUTPATIENT
Start: 2021-12-27

## 2021-12-27 RX ORDER — SODIUM CHLORIDE 0.9 % (FLUSH) 0.9 %
10 SYRINGE (ML) INJECTION EVERY 8 HOURS
Status: CANCELLED | OUTPATIENT
Start: 2021-12-27

## 2021-12-27 RX ORDER — GLUCAGON 1 MG
1 KIT INJECTION
Status: CANCELLED | OUTPATIENT
Start: 2021-12-27

## 2021-12-27 RX ORDER — CEFEPIME HYDROCHLORIDE 1 G/50ML
1 INJECTION, SOLUTION INTRAVENOUS
Status: COMPLETED | OUTPATIENT
Start: 2021-12-27 | End: 2021-12-27

## 2021-12-27 RX ORDER — MUPIROCIN 20 MG/G
OINTMENT TOPICAL 2 TIMES DAILY
Status: DISCONTINUED | OUTPATIENT
Start: 2021-12-27 | End: 2021-12-27 | Stop reason: HOSPADM

## 2021-12-27 RX ORDER — ENOXAPARIN SODIUM 100 MG/ML
40 INJECTION SUBCUTANEOUS EVERY 24 HOURS
Status: CANCELLED | OUTPATIENT
Start: 2021-12-27

## 2021-12-27 RX ORDER — TALC
8 POWDER (GRAM) TOPICAL NIGHTLY PRN
Status: CANCELLED | OUTPATIENT
Start: 2021-12-27

## 2021-12-27 RX ORDER — CEFEPIME HYDROCHLORIDE 1 G/50ML
1 INJECTION, SOLUTION INTRAVENOUS
Status: DISCONTINUED | OUTPATIENT
Start: 2021-12-27 | End: 2021-12-27 | Stop reason: HOSPADM

## 2021-12-27 RX ADMIN — VANCOMYCIN HYDROCHLORIDE 1250 MG: 1.25 INJECTION, POWDER, LYOPHILIZED, FOR SOLUTION INTRAVENOUS at 02:12

## 2021-12-27 RX ADMIN — SODIUM CHLORIDE 1000 ML: 0.9 INJECTION, SOLUTION INTRAVENOUS at 03:12

## 2021-12-27 RX ADMIN — SODIUM CHLORIDE: 0.9 INJECTION, SOLUTION INTRAVENOUS at 01:12

## 2021-12-27 RX ADMIN — CEFEPIME HYDROCHLORIDE 1 G: 1 INJECTION, SOLUTION INTRAVENOUS at 01:12

## 2021-12-27 NOTE — PLAN OF CARE
12/27/21 1010   Final Note   Assessment Type Final Discharge Note   Anticipated Discharge Disposition halfway [facility]

## 2021-12-27 NOTE — ED PROVIDER NOTES
Encounter Date: 12/26/2021       History     Chief Complaint   Patient presents with    Respiratory Distress     Sent from [place] for pneumonia     This patient is a 61-year-old female with anoxic brain injury, cirrhosis, GERD, hypertension, pulmonary embolism, peg tube, tracheostomy dependent presenting from [place] with concerns for pneumonia.  Patient received a chest x-ray yesterday which indicates evolving infiltrates.  Patient is nonverbal.  Vital signs stable on initial assessment.        Review of patient's allergies indicates:  No Known Allergies  Past Medical History:   Diagnosis Date    Anoxic brain damage 07/2020    Bedbound 07/2020    Cardiac arrest 07/2020    Cirrhosis     GERD (gastroesophageal reflux disease)     Hemangioma of intra-abdominal structure     Hypertension     Nursing home resident     Protein calorie malnutrition     Pulmonary embolus     Respiratory distress     S/P percutaneous endoscopic gastrostomy (PEG) tube placement 07/2020    Total self-care deficit 07/2020    Tracheostomy dependence     7/2020     Past Surgical History:   Procedure Laterality Date    PEG W/TRACHEOSTOMY PLACEMENT  07/27/2020    SKIN GRAFT      buttocks     Family History   Problem Relation Age of Onset    No Known Problems Mother     No Known Problems Father      Social History     Tobacco Use    Smoking status: Never Smoker    Smokeless tobacco: Never Used   Substance Use Topics    Alcohol use: Not Currently    Drug use: Not Currently     Review of Systems   Unable to perform ROS: Mental status change       Physical Exam     Initial Vitals [12/26/21 2318]   BP Pulse Resp Temp SpO2   103/76 103 20 98.9 °F (37.2 °C) 99 %      MAP       --         Physical Exam    Nursing note and vitals reviewed.  Constitutional: No distress.   HENT:   Head: Normocephalic and atraumatic.   Eyes: Conjunctivae are normal.   Neck:   Stable tracheostomy   Cardiovascular: Regular rhythm.   Tachycardic and  regular   Pulmonary/Chest: No respiratory distress. She has no wheezes.   Abdominal: She exhibits no distension.   Left upper abdominal PEG   Musculoskeletal:      Comments: Generalized extremity atrophy and contracture     Skin: Skin is warm and dry.         ED Course   Procedures  Labs Reviewed   CBC W/ AUTO DIFFERENTIAL - Abnormal; Notable for the following components:       Result Value    RBC 3.95 (*)     Hemoglobin 11.2 (*)     Hematocrit 35.4 (*)     MCHC 31.6 (*)     RDW 16.7 (*)     Lymph # 0.9 (*)     Gran % 81.1 (*)     Lymph % 12.1 (*)     All other components within normal limits   COMPREHENSIVE METABOLIC PANEL - Abnormal; Notable for the following components:    Total Protein 8.6 (*)     Albumin 3.0 (*)     Alkaline Phosphatase 187 (*)     ALT 64 (*)     All other components within normal limits   URINALYSIS, REFLEX TO URINE CULTURE - Abnormal; Notable for the following components:    Specific Gravity, UA <=1.005 (*)     Occult Blood UA 1+ (*)     Leukocytes, UA 1+ (*)     All other components within normal limits    Narrative:     Specimen Source->Urine   URINALYSIS MICROSCOPIC - Abnormal; Notable for the following components:    WBC, UA 13 (*)     All other components within normal limits    Narrative:     Specimen Source->Urine   CULTURE, BLOOD   CULTURE, BLOOD   CULTURE, URINE   LACTIC ACID, PLASMA   PROCALCITONIN   B-TYPE NATRIURETIC PEPTIDE   LACTIC ACID, PLASMA   SARS-COV-2 RNA AMPLIFICATION, QUAL          Imaging Results          X-Ray Chest AP Portable (In process)                  Medications   acetaminophen suppository 650 mg (has no administration in time range)   cefepime in dextrose 5 % 1 gram/50 mL IVPB 1 g (has no administration in time range)   vancomycin - pharmacy to dose (has no administration in time range)   mupirocin 2 % ointment (has no administration in time range)   vancomycin 1.25 g in dextrose 5% 250 mL IVPB (ready to mix) (0 mg/kg × 62.6 kg Intravenous Stopped 12/27/21  "0401)   cefepime in dextrose 5 % 1 gram/50 mL IVPB 1 g (0 g Intravenous Stopped 12/27/21 0232)   sodium chloride 0.9% bolus 1,000 mL (0 mLs Intravenous Stopped 12/27/21 0425)     Medical Decision Making:   ED Management:  This patient was assessed shortly after arrival.  Initial vital signs significant for tachycardia.  Patient received chest x-ray which indicates involving bilateral infiltrates.  Patient will be initiated on broad-spectrum antibiotics.  Sepsis order set initiated.  Lactate within normal limits.  Blood pressures are stable.  Since the patient will be treated for ventilator associated pneumonia she does warrant admission for IV antibiotics.  Case discussed with and accepted by the on-call nurse practitioner hospitalist.  Transported to the unit in stable condition.             Sepsis Perfusion Assessment: "I attest a sepsis perfusion exam was performed within 6 hours of sepsis, severe sepsis, or septic shock presentation, following fluid resuscitation."        Clinical Impression:   Final diagnoses:  [J18.9, Y95] Hospital-acquired pneumonia          ED Disposition Condition    Admit               Arturo Wells MD  12/27/21 0628    "

## 2021-12-27 NOTE — CONSULTS
Pharmacokinetic Initial Assessment: IV Vancomycin    Assessment/Plan:    Initiate intravenous vancomycin with dose of 1250 mg once with subsequent doses when random concentrations are less than 20 mcg/mL  Desired empiric serum trough concentration is 15 to 20 mcg/mL  Draw vancomycin random level on 12/27/21 at 1930.  Pharmacy will continue to follow and monitor vancomycin.      Please contact pharmacy at extension [phone] with any questions regarding this assessment.     Thank you for the consult,   Andrei Spencer       Patient brief summary:  Genna Felix is a 61 y.o. female initiated on antimicrobial therapy with IV Vancomycin for treatment of suspected lower respiratory infection    Drug Allergies:   Review of patient's allergies indicates:  No Known Allergies    Actual Body Weight:   62.6kg    Renal Function:   Estimated Creatinine Clearance: 100 mL/min (based on SCr of 0.5 mg/dL).,     CBC (last 72 hours):  Recent Labs   Lab Result Units 12/27/21  0038   WBC K/uL 7.76   Hemoglobin g/dL 11.2*   Hematocrit % 35.4*   Platelets K/uL 321   Gran % % 81.1*   Lymph % % 12.1*   Mono % % 4.0   Eosinophil % % 2.4   Basophil % % 0.1   Differential Method  Automated       Metabolic Panel (last 72 hours):  Recent Labs   Lab Result Units 12/27/21  0038 12/27/21  0340   Sodium mmol/L 136  --    Potassium mmol/L 4.3  --    Chloride mmol/L 97  --    CO2 mmol/L 27  --    Glucose mg/dL 99  --    Glucose, UA   --  Negative   BUN mg/dL 17  --    Creatinine mg/dL 0.5  --    Albumin g/dL 3.0*  --    Total Bilirubin mg/dL 1.0  --    Alkaline Phosphatase U/L 187*  --    AST U/L 34  --    ALT U/L 64*  --        Drug levels (last 3 results):  No results for input(s): VANCOMYCINRA, VANCOMYCINPE, VANCOMYCINTR in the last 72 hours.    Microbiologic Results:  Microbiology Results (last 7 days)       Procedure Component Value Units Date/Time    Urine culture [[id number]] Collected: 12/27/21 0340    Order Status: No result Specimen: Urine Updated:  12/27/21 0354    Blood culture x two cultures. Draw prior to antibiotics. [[id number]] Collected: 12/27/21 0038    Order Status: Sent Specimen: Blood Updated: 12/27/21 0043    Blood culture x two cultures. Draw prior to antibiotics. [[id number]] Collected: 12/27/21 0038    Order Status: Sent Specimen: Blood Updated: 12/27/21 0038

## 2021-12-27 NOTE — ED NOTES
Pt. brief changed and turned in bed; Rectal temp noted as 98.5; no obvious pressure ulcers ,internal hemorrhoid noted.

## 2021-12-27 NOTE — PLAN OF CARE
12/27/21 1011   SANDOVAL Message   Medicare Outpatient and Observation Notification regarding financial responsibility Given to patient/caregiver;Explained to patient/caregiver;Signed/date by patient/caregiver   Date SANDOVAL was signed 12/27/21   Time SANDOVAL was signed 1011

## 2021-12-27 NOTE — CARE UPDATE
12/27/21 1128   Ready to Wean Parameters   Ventilator Discontinued Yes     Pt discharged back to [place]

## 2021-12-27 NOTE — CARE UPDATE
12/27/21 0746   Patient Assessment/Suction   Level of Consciousness (AVPU) alert   All Lung Fields Breath Sounds coarse;diminished   Suction Method tracheal   $ Suction Charges Inline Suction Procedure Stat Charge   Secretions Amount large   Secretions Color pale;yellow   Secretions Characteristics thick   PRE-TX-O2   O2 Device (Oxygen Therapy) ventilator   $ Is the patient on Low Flow Oxygen? Yes   Oxygen Concentration (%) 28   SpO2 96 %   Pulse Oximetry Type Continuous   $ Pulse Oximetry - Multiple Charge Pulse Oximetry - Multiple   Pulse 98   Resp 20   Skin Integrity   $ Wound Care Tech Time 15 min   Area Observed Neck under tracheostomy   Skin Appearance without discoloration   Barrier used? Other (see comments)  (Drain sponge)        Surgical Airway Portex Cuffed   No placement date or time found.   Present Prior to Hospital Arrival?: Yes  Type: Tracheostomy  Brand: Portex  Airway Device Size: 8.0  Style: Cuffed   Cuff Inflation? Inflated   Status Secured   Site Assessment Clean;Dry   Ties Assessment Clean;Dry;Intact   Airway Safety   Ambu bag with the patient? Yes, Adult Ambu   Is mask with the patient? Yes, Adult Mask   Vent Select   Charged w/in last 24h YES   Preset Conventional Ventilator Settings   Vent Type NKV-550   Ventilation Type VC   Vent Mode A/CMV-PRVC   Humidity HME   Set Rate 20 BPM   Vt Set 370 mL   PEEP/CPAP 4.8 cmH20   Patient Ventilator Parameters   Resp Rate Total 20 br/min   Peak Airway Pressure 32.2 cmH2O   Exhaled Vt 312 mL   Total Ve 6.54 mL   I:E Ratio Measured 1:2.8   Inspired Tidal Volume (VTI) 324 mL   Conventional Ventilator Alarms   Alarms On Y   Apnea Rate 15   Apnea Volume (mL) 300 mL   Ready to Wean/Extubation Screen   FIO2<=50 (chart decimal) 0.28   MV<16L (chart vol.) 6.54   PEEP <=8 (chart #) 4.8   Ready to Wean Parameters   F/VT Ratio<105 (RSBI) (!) 64.1

## 2021-12-27 NOTE — PLAN OF CARE
The pt is cleared for discharge from case management back to [facility] .    12/27/21 1008   Final Note   Assessment Type Final Discharge Note   Anticipated Discharge Disposition skilled nursing [facility]

## 2021-12-27 NOTE — UM SECONDARY REVIEW
Patient was changed to observation by Hospitalist due to early discharge, and patient not meeting acute criteria.  Condition code 44 completed/signed by Dr Garcia; JOO obtained [place].

## 2021-12-27 NOTE — PLAN OF CARE
Discharge orders and ER note sent to [place] for review. CM following.     Pt is still housed in the ED and not being admitted.     Since the patient will be treated for ventilator associated pneumonia she does warrant admission for IV antibiotics.     Please let me know when ready for report. Thanks Sri [phone] 8:47 am     12/27/21 0848   Post-Acute Status   Post-Acute Authorization Placement   Post-Acute Placement Status Referrals Sent

## 2021-12-27 NOTE — PLAN OF CARE
I spoke to Marissa at [facility] [phone] and asked if we are ok to send the pt back. She stated that she gave the orders to Noé and he will likely be the one to take report. [facility] number is [phone]. I updated the pts nurse in the ER to try to call report. Once report is given I will complete an ADT 30 for ambulance transport back to [facility]. CM following.    Per ER nurse she already called report to Michelle at [facility].     ADT order for ambulance completed by Gloria    12/27/21 0958   Post-Acute Status   Post-Acute Authorization Placement   Post-Acute Placement Status Set-up Complete/Auth obtained

## 2021-12-27 NOTE — ED NOTES
Discussed DC meds and DC instructions with Michelle at [facility]. Will provide handout, transportation started. Will notify RT pt will be leaving shortly.

## 2021-12-27 NOTE — HPI
Genna Felix is a 61 year old female with a past medical history of anoxic brain injury s/p G tube and tracheostomy, PE, HTN, GERD who presented from [place] given concern for pneumonia on CXR. She is unable to provide any medical history. She was not febrile or tachycardic in the ED. Her WBC on CBC is normal. Procalcitonin is also normal. CXR did show R sided infiltrate. U/A concerning for UTI As she displayed no evidence of sepsis in setting of possible acute cystitis, the patient was discharged back to [place] with a short course of Bactrim and discharged 12/27/2021.

## 2021-12-28 LAB
BACTERIA UR CULT: NORMAL
BACTERIA UR CULT: NORMAL

## 2021-12-28 NOTE — DISCHARGE SUMMARY
[facility]  [facility] Medicine  Discharge Summary      Patient Name: Genna Felix  MRN: [id number]  Patient Class: OP- Observation  Admission Date: 12/26/2021  Hospital Length of Stay: 1 days  Discharge Date and Time: 12/27/2021 11:35 AM  Attending Physician: No att. providers found   Discharging Provider: Kirill Garcia MD  Primary Care Provider: Primary Doctor No      HPI:   Genna Felix is a 61 year old female with a past medical history of anoxic brain injury s/p G tube and tracheostomy, PE, HTN, GERD who presented from [place] given concern for pneumonia on CXR. She is unable to provide any medical history. She was not febrile or tachycardic in the ED. Her WBC on CBC is normal. Procalcitonin is also normal. CXR did show R sided infiltrate. U/A concerning for UTI As she displayed no evidence of sepsis in setting of possible acute cystitis, the patient was discharged back to [place] with a short course of Bactrim and discharged 12/27/2021.      * No surgery found *      Hospital Course:   Please refer to H and P. Same day discharge.       Goals of Care Treatment Preferences:  Code Status: DNR       LaPOST: Yes           Consults:     No new Assessment & Plan notes have been filed under this hospital service since the last note was generated.  Service: Hospital Medicine    Final Active Diagnoses:    Diagnosis Date Noted POA    PRINCIPAL PROBLEM:  UTI (urinary tract infection) [N39.0] 12/27/2021 Yes    Essential hypertension [I10] 10/24/2021 Yes    Gastroesophageal reflux disease [K21.9] 10/24/2021 Yes    Chronic respiratory failure with hypoxia [J96.11] 05/10/2021 Yes    PEG (percutaneous endoscopic gastrostomy) status [Z93.1] 05/01/2021 Not Applicable    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] 05/01/2021 Not Applicable    Anoxic brain injury [G93.1] 04/21/2021 Yes    Anemia of chronic disease [D63.8] 04/21/2021 Yes      Problems Resolved During this Admission:       Discharged Condition:  stable    Disposition: Long Term Care    Follow Up:   Follow-up Information     [facility].    Specialties: SNF Agency, Nursing Home Agency  Why: Nursing Home, [facility]  Contact information:  [address] [place] [address] [phone]                       Patient Instructions:      Notify your health care provider if you experience any of the following:  temperature >100.4     Notify your health care provider if you experience any of the following:  persistent nausea and vomiting or diarrhea     Notify your health care provider if you experience any of the following:  severe uncontrolled pain     Notify your health care provider if you experience any of the following:  worsening rash     Notify your health care provider if you experience any of the following:  difficulty breathing or increased cough     Notify your health care provider if you experience any of the following:  persistent dizziness, light-headedness, or visual disturbances     Notify your health care provider if you experience any of the following:  increased confusion or weakness     Notify your health care provider if you experience any of the following:  severe persistent headache     Activity as tolerated       Significant Diagnostic Studies: Labs:   CMP   Recent Labs   Lab 12/27/21 0038      K 4.3   CL 97   CO2 27   GLU 99   BUN 17   CREATININE 0.5   CALCIUM 10.0   PROT 8.6*   ALBUMIN 3.0*   BILITOT 1.0   ALKPHOS 187*   AST 34   ALT 64*   ANIONGAP 12   ESTGFRAFRICA >60   EGFRNONAA >60   , CBC   Recent Labs   Lab 12/27/21  0038   WBC 7.76   HGB 11.2*   HCT 35.4*       and All labs within the past 24 hours have been reviewed    Pending Diagnostic Studies:     None         Medications:  Reconciled Home Medications:      Medication List      START taking these medications    sulfamethoxazole-trimethoprim 800-160mg 800-160 mg Tab  Commonly known as: BACTRIM DS  1 tablet by Per G Tube route  2 (two) times daily.        CONTINUE taking these medications    acetaminophen 325 MG tablet  Commonly known as: TYLENOL  2 tablets (650 mg total) by Per G Tube route every 4 (four) hours as needed for Pain or Temperature greater than (100.4F).     acetylcysteine 100 mg/ml (10%) 100 mg/mL (10 %) nebulizer solution  Commonly known as: MUCOMYST  Take 4 mLs by nebulization every 8 (eight) hours.     albuterol-ipratropium 2.5 mg-0.5 mg/3 mL nebulizer solution  Commonly known as: DUO-NEB  Take 3 mLs by nebulization every 6 (six) hours. Rescue     clonazePAM 0.5 MG tablet  Commonly known as: KlonoPIN  1 tablet (0.5 mg total) by Per G Tube route 3 (three) times daily.     ergocalciferol 50,000 unit Cap  Commonly known as: ERGOCALCIFEROL  Take 50,000 Units by mouth every 7 days.     ferrous sulfate 300 mg (60 mg iron)/5 mL syrup  Take 300 mg by mouth As instructed. Every other day     midodrine 10 MG tablet  Commonly known as: PROAMATINE  1 tablet (10 mg total) by Per G Tube route 3 (three) times daily.     polyethylene glycol 17 gram Pwpk  Commonly known as: GLYCOLAX  Take by mouth daily as needed.            Indwelling Lines/Drains at time of discharge:   Lines/Drains/Airways     Drain                 Gastrostomy/Enterostomy LUQ -- days         Gastrostomy/Enterostomy 07/27/20 518 days          Airway                 Surgical Airway Portex Cuffed -- days         Surgical Airway 07/27/20 Shiley Cuffed 518 days                Time spent on the discharge of patient: 34 minutes         Kirill Garcia MD  Department of Hospital Medicine  [facility] - Emergency Dept

## 2021-12-28 NOTE — SUBJECTIVE & OBJECTIVE
Past Medical History:   Diagnosis Date    Anoxic brain damage 07/2020    Bedbound 07/2020    Cardiac arrest 07/2020    Cirrhosis     GERD (gastroesophageal reflux disease)     Hemangioma of intra-abdominal structure     Hypertension     Nursing home resident     Protein calorie malnutrition     Pulmonary embolus     Respiratory distress     S/P percutaneous endoscopic gastrostomy (PEG) tube placement 07/2020    Total self-care deficit 07/2020    Tracheostomy dependence     7/2020       Past Surgical History:   Procedure Laterality Date    PEG W/TRACHEOSTOMY PLACEMENT  07/27/2020    SKIN GRAFT      buttocks       Review of patient's allergies indicates:  No Known Allergies    No current facility-administered medications on file prior to encounter.     Current Outpatient Medications on File Prior to Encounter   Medication Sig    acetaminophen (TYLENOL) 325 MG tablet 2 tablets (650 mg total) by Per G Tube route every 4 (four) hours as needed for Pain or Temperature greater than (100.4F).    acetylcysteine 100 mg/ml, 10%, (MUCOMYST) 100 mg/mL (10 %) nebulizer solution Take 4 mLs by nebulization every 8 (eight) hours.    albuterol-ipratropium (DUO-NEB) 2.5 mg-0.5 mg/3 mL nebulizer solution Take 3 mLs by nebulization every 6 (six) hours. Rescue    clonazePAM (KLONOPIN) 0.5 MG tablet 1 tablet (0.5 mg total) by Per G Tube route 3 (three) times daily.    ergocalciferol (ERGOCALCIFEROL) 50,000 unit Cap Take 50,000 Units by mouth every 7 days.    ferrous sulfate 300 mg (60 mg iron)/5 mL syrup Take 300 mg by mouth As instructed. Every other day    midodrine (PROAMATINE) 10 MG tablet 1 tablet (10 mg total) by Per G Tube route 3 (three) times daily.    polyethylene glycol (GLYCOLAX) 17 gram PwPk Take by mouth daily as needed.     Family History     Problem Relation (Age of Onset)    No Known Problems Mother, Father        Tobacco Use    Smoking status: Never Smoker    Smokeless tobacco: Never Used    Substance and Sexual Activity    Alcohol use: Not Currently    Drug use: Not Currently    Sexual activity: Not Currently     Review of Systems   Unable to perform ROS: Patient nonverbal     Objective:     Vital Signs (Most Recent):  Temp: 98.8 °F (37.1 °C) (12/27/21 0340)  Pulse: 103 (12/27/21 1003)  Resp: 20 (12/27/21 0746)  BP: (!) 137/90 (12/27/21 1003)  SpO2: (!) 92 % (12/27/21 1003) Vital Signs (24h Range):  Temp:  [98.8 °F (37.1 °C)-98.9 °F (37.2 °C)] 98.8 °F (37.1 °C)  Pulse:  [] 103  Resp:  [20] 20  SpO2:  [91 %-99 %] 92 %  BP: (102-137)/(70-90) 137/90     Weight: 62.6 kg (138 lb)  Body mass index is 26.95 kg/m².    Physical Exam  Vitals and nursing note reviewed.   Constitutional:       Appearance: She is ill-appearing (chronically.).   HENT:      Head: Normocephalic and atraumatic.      Right Ear: External ear normal.      Left Ear: External ear normal.      Nose: Nose normal.      Mouth/Throat:      Mouth: Mucous membranes are moist.      Pharynx: Oropharynx is clear.   Eyes:      Extraocular Movements: Extraocular movements intact.      Conjunctiva/sclera: Conjunctivae normal.   Cardiovascular:      Rate and Rhythm: Normal rate and regular rhythm.      Pulses: Normal pulses.      Heart sounds: Normal heart sounds.   Pulmonary:      Effort: Pulmonary effort is normal.      Breath sounds: Normal breath sounds.      Comments: Tracheostomy with mechanical ventilation.  Abdominal:      General: Bowel sounds are normal.      Palpations: Abdomen is soft.      Comments: PEG.   Musculoskeletal:      Cervical back: Normal range of motion and neck supple.      Right lower leg: No edema.      Left lower leg: No edema.   Skin:     General: Skin is warm and dry.   Neurological:      Mental Status: Mental status is at baseline.             Significant Labs: All pertinent labs within the past 24 hours have been reviewed.    Significant Imaging: I have reviewed all pertinent imaging results/findings within the  past 24 hours.

## 2021-12-28 NOTE — H&P
[facility] Medicine  History & Physical    Patient Name: Genna Felix  MRN: [id number]  Patient Class: OP- Observation  Admission Date: 12/26/2021  Attending Physician: Kirill Garcia MD  Primary Care Provider: Primary Doctor No         Patient information was obtained from past medical records and ER records.     Subjective:     Principal Problem:UTI (urinary tract infection)    Chief Complaint:   Chief Complaint   Patient presents with    Respiratory Distress     Sent from [place] for pneumonia        HPI: Genna Felix is a 61 year old female with a past medical history of anoxic brain injury s/p G tube and tracheostomy, PE, HTN, GERD who presented from [place] given concern for pneumonia on CXR. She is unable to provide any medical history. She was not febrile or tachycardic in the ED. Her WBC on CBC is normal. Procalcitonin is also normal. CXR did show R sided infiltrate. U/A concerning for UTI As she displayed no evidence of sepsis in setting of possible acute cystitis, the patient was discharged back to [place] with a short course of Bactrim and discharged 12/27/2021.      Past Medical History:   Diagnosis Date    Anoxic brain damage 07/2020    Bedbound 07/2020    Cardiac arrest 07/2020    Cirrhosis     GERD (gastroesophageal reflux disease)     Hemangioma of intra-abdominal structure     Hypertension     Nursing home resident     Protein calorie malnutrition     Pulmonary embolus     Respiratory distress     S/P percutaneous endoscopic gastrostomy (PEG) tube placement 07/2020    Total self-care deficit 07/2020    Tracheostomy dependence     7/2020       Past Surgical History:   Procedure Laterality Date    PEG W/TRACHEOSTOMY PLACEMENT  07/27/2020    SKIN GRAFT      buttocks       Review of patient's allergies indicates:  No Known Allergies    No current facility-administered medications on file prior to encounter.     Current Outpatient Medications on File Prior to  Encounter   Medication Sig    acetaminophen (TYLENOL) 325 MG tablet 2 tablets (650 mg total) by Per G Tube route every 4 (four) hours as needed for Pain or Temperature greater than (100.4F).    acetylcysteine 100 mg/ml, 10%, (MUCOMYST) 100 mg/mL (10 %) nebulizer solution Take 4 mLs by nebulization every 8 (eight) hours.    albuterol-ipratropium (DUO-NEB) 2.5 mg-0.5 mg/3 mL nebulizer solution Take 3 mLs by nebulization every 6 (six) hours. Rescue    clonazePAM (KLONOPIN) 0.5 MG tablet 1 tablet (0.5 mg total) by Per G Tube route 3 (three) times daily.    ergocalciferol (ERGOCALCIFEROL) 50,000 unit Cap Take 50,000 Units by mouth every 7 days.    ferrous sulfate 300 mg (60 mg iron)/5 mL syrup Take 300 mg by mouth As instructed. Every other day    midodrine (PROAMATINE) 10 MG tablet 1 tablet (10 mg total) by Per G Tube route 3 (three) times daily.    polyethylene glycol (GLYCOLAX) 17 gram PwPk Take by mouth daily as needed.     Family History     Problem Relation (Age of Onset)    No Known Problems Mother, Father        Tobacco Use    Smoking status: Never Smoker    Smokeless tobacco: Never Used   Substance and Sexual Activity    Alcohol use: Not Currently    Drug use: Not Currently    Sexual activity: Not Currently     Review of Systems   Unable to perform ROS: Patient nonverbal     Objective:     Vital Signs (Most Recent):  Temp: 98.8 °F (37.1 °C) (12/27/21 0340)  Pulse: 103 (12/27/21 1003)  Resp: 20 (12/27/21 0746)  BP: (!) 137/90 (12/27/21 1003)  SpO2: (!) 92 % (12/27/21 1003) Vital Signs (24h Range):  Temp:  [98.8 °F (37.1 °C)-98.9 °F (37.2 °C)] 98.8 °F (37.1 °C)  Pulse:  [] 103  Resp:  [20] 20  SpO2:  [91 %-99 %] 92 %  BP: (102-137)/(70-90) 137/90     Weight: 62.6 kg (138 lb)  Body mass index is 26.95 kg/m².    Physical Exam  Vitals and nursing note reviewed.   Constitutional:       Appearance: She is ill-appearing (chronically.).   HENT:      Head: Normocephalic and atraumatic.      Right Ear:  External ear normal.      Left Ear: External ear normal.      Nose: Nose normal.      Mouth/Throat:      Mouth: Mucous membranes are moist.      Pharynx: Oropharynx is clear.   Eyes:      Extraocular Movements: Extraocular movements intact.      Conjunctiva/sclera: Conjunctivae normal.   Cardiovascular:      Rate and Rhythm: Normal rate and regular rhythm.      Pulses: Normal pulses.      Heart sounds: Normal heart sounds.   Pulmonary:      Effort: Pulmonary effort is normal.      Breath sounds: Normal breath sounds.      Comments: Tracheostomy with mechanical ventilation.  Abdominal:      General: Bowel sounds are normal.      Palpations: Abdomen is soft.      Comments: PEG.   Musculoskeletal:      Cervical back: Normal range of motion and neck supple.      Right lower leg: No edema.      Left lower leg: No edema.   Skin:     General: Skin is warm and dry.   Neurological:      Mental Status: Mental status is at baseline.             Significant Labs: All pertinent labs within the past 24 hours have been reviewed.    Significant Imaging: I have reviewed all pertinent imaging results/findings within the past 24 hours.    Assessment/Plan:     * UTI (urinary tract infection)  -Bactrim short course      Gastroesophageal reflux disease  -Continue home medications      Essential hypertension  -Continue home medications      Chronic respiratory failure with hypoxia  -Continued home ventilator settings via tracheostomy    PEG (percutaneous endoscopic gastrostomy) status  Functional.      Tracheostomy dependence  Stable.      Anoxic brain injury  Stable.      Anemia of chronic disease  Stable.        VTE Risk Mitigation (From admission, onward)    None             Kirill Garcia MD  Department of Hospital Medicine   [facility] - Emergency Dept

## 2022-01-01 LAB
BACTERIA BLD CULT: NORMAL
BACTERIA BLD CULT: NORMAL

## 2022-01-03 ENCOUNTER — HOSPITAL ENCOUNTER (INPATIENT)
Facility: HOSPITAL | Age: 62
LOS: 18 days | Discharge: NURSING, REHAB OR LONG-TERM CARE | DRG: 870 | End: 2022-01-21
Attending: EMERGENCY MEDICINE | Admitting: HOSPITALIST
Payer: MEDICARE

## 2022-01-03 DIAGNOSIS — L89.324 PRESSURE INJURY OF LEFT BUTTOCK, STAGE 4: ICD-10-CM

## 2022-01-03 DIAGNOSIS — D63.8 ANEMIA OF CHRONIC DISEASE: ICD-10-CM

## 2022-01-03 DIAGNOSIS — R65.20 SEVERE SEPSIS: ICD-10-CM

## 2022-01-03 DIAGNOSIS — J96.11 CHRONIC RESPIRATORY FAILURE WITH HYPOXIA: ICD-10-CM

## 2022-01-03 DIAGNOSIS — Z93.1 PEG (PERCUTANEOUS ENDOSCOPIC GASTROSTOMY) STATUS: ICD-10-CM

## 2022-01-03 DIAGNOSIS — Z93.0 TRACHEOSTOMY DEPENDENCE: ICD-10-CM

## 2022-01-03 DIAGNOSIS — A41.9 SEPSIS, DUE TO UNSPECIFIED ORGANISM, UNSPECIFIED WHETHER ACUTE ORGAN DYSFUNCTION PRESENT: ICD-10-CM

## 2022-01-03 DIAGNOSIS — K81.0 ACUTE CHOLECYSTITIS: ICD-10-CM

## 2022-01-03 DIAGNOSIS — A41.9 SEPSIS: ICD-10-CM

## 2022-01-03 DIAGNOSIS — K80.10 CALCULUS OF GALLBLADDER WITH CHRONIC CHOLECYSTITIS WITHOUT OBSTRUCTION: Primary | ICD-10-CM

## 2022-01-03 DIAGNOSIS — E44.0 MALNUTRITION OF MODERATE DEGREE: ICD-10-CM

## 2022-01-03 DIAGNOSIS — I10 ESSENTIAL HYPERTENSION: ICD-10-CM

## 2022-01-03 DIAGNOSIS — J95.851 VENTILATOR ASSOCIATED PNEUMONIA: ICD-10-CM

## 2022-01-03 DIAGNOSIS — R00.0 TACHYCARDIA: ICD-10-CM

## 2022-01-03 DIAGNOSIS — G93.1 ANOXIC BRAIN INJURY: ICD-10-CM

## 2022-01-03 DIAGNOSIS — K21.9 GASTROESOPHAGEAL REFLUX DISEASE, UNSPECIFIED WHETHER ESOPHAGITIS PRESENT: ICD-10-CM

## 2022-01-03 DIAGNOSIS — A41.9 SEVERE SEPSIS: ICD-10-CM

## 2022-01-03 LAB
ALBUMIN SERPL BCP-MCNC: 3.2 G/DL (ref 3.5–5.2)
ALP SERPL-CCNC: 222 U/L (ref 55–135)
ALT SERPL W/O P-5'-P-CCNC: 46 U/L (ref 10–44)
ANION GAP SERPL CALC-SCNC: 13 MMOL/L (ref 8–16)
AST SERPL-CCNC: 25 U/L (ref 10–40)
BACTERIA #/AREA URNS HPF: ABNORMAL /HPF
BASOPHILS # BLD AUTO: 0.03 K/UL (ref 0–0.2)
BASOPHILS NFR BLD: 0.2 % (ref 0–1.9)
BILIRUB SERPL-MCNC: 1.1 MG/DL (ref 0.1–1)
BILIRUB UR QL STRIP: NEGATIVE
BUN SERPL-MCNC: 21 MG/DL (ref 6–20)
CALCIUM SERPL-MCNC: 9.5 MG/DL (ref 8.7–10.5)
CHLORIDE SERPL-SCNC: 92 MMOL/L (ref 95–110)
CLARITY UR: CLEAR
CO2 SERPL-SCNC: 18 MMOL/L (ref 23–29)
COLOR UR: YELLOW
CREAT SERPL-MCNC: 0.6 MG/DL (ref 0.5–1.4)
DIFFERENTIAL METHOD: ABNORMAL
EOSINOPHIL # BLD AUTO: 0.2 K/UL (ref 0–0.5)
EOSINOPHIL NFR BLD: 1.1 % (ref 0–8)
ERYTHROCYTE [DISTWIDTH] IN BLOOD BY AUTOMATED COUNT: 16.8 % (ref 11.5–14.5)
EST. GFR  (AFRICAN AMERICAN): >60 ML/MIN/1.73 M^2
EST. GFR  (NON AFRICAN AMERICAN): >60 ML/MIN/1.73 M^2
GLUCOSE SERPL-MCNC: 148 MG/DL (ref 70–110)
GLUCOSE UR QL STRIP: NEGATIVE
HCT VFR BLD AUTO: 36.5 % (ref 37–48.5)
HGB BLD-MCNC: 11.8 G/DL (ref 12–16)
HGB UR QL STRIP: ABNORMAL
IMM GRANULOCYTES # BLD AUTO: 0.07 K/UL (ref 0–0.04)
IMM GRANULOCYTES NFR BLD AUTO: 0.5 % (ref 0–0.5)
KETONES UR QL STRIP: NEGATIVE
LACTATE SERPL-SCNC: 2.4 MMOL/L (ref 0.5–2.2)
LACTATE SERPL-SCNC: 2.6 MMOL/L (ref 0.5–2.2)
LEUKOCYTE ESTERASE UR QL STRIP: ABNORMAL
LYMPHOCYTES # BLD AUTO: 0.8 K/UL (ref 1–4.8)
LYMPHOCYTES NFR BLD: 5.6 % (ref 18–48)
MCH RBC QN AUTO: 28.2 PG (ref 27–31)
MCHC RBC AUTO-ENTMCNC: 32.3 G/DL (ref 32–36)
MCV RBC AUTO: 87 FL (ref 82–98)
MICROSCOPIC COMMENT: ABNORMAL
MONOCYTES # BLD AUTO: 0.7 K/UL (ref 0.3–1)
MONOCYTES NFR BLD: 5.1 % (ref 4–15)
NEUTROPHILS # BLD AUTO: 12.3 K/UL (ref 1.8–7.7)
NEUTROPHILS NFR BLD: 87.5 % (ref 38–73)
NITRITE UR QL STRIP: NEGATIVE
NRBC BLD-RTO: 0 /100 WBC
PH UR STRIP: 8 [PH] (ref 5–8)
PLATELET # BLD AUTO: 346 K/UL (ref 150–450)
PMV BLD AUTO: 10.5 FL (ref 9.2–12.9)
POTASSIUM SERPL-SCNC: 3.6 MMOL/L (ref 3.5–5.1)
PROT SERPL-MCNC: 8.4 G/DL (ref 6–8.4)
PROT UR QL STRIP: ABNORMAL
RBC # BLD AUTO: 4.19 M/UL (ref 4–5.4)
RBC #/AREA URNS HPF: 1 /HPF (ref 0–4)
SARS-COV-2 RDRP RESP QL NAA+PROBE: NEGATIVE
SODIUM SERPL-SCNC: 123 MMOL/L (ref 136–145)
SP GR UR STRIP: 1.01 (ref 1–1.03)
SQUAMOUS #/AREA URNS HPF: 2 /HPF
TRI-PHOS CRY URNS QL MICRO: ABNORMAL
URN SPEC COLLECT METH UR: ABNORMAL
UROBILINOGEN UR STRIP-ACNC: NEGATIVE EU/DL
WBC # BLD AUTO: 14.01 K/UL (ref 3.9–12.7)
WBC #/AREA URNS HPF: 24 /HPF (ref 0–5)

## 2022-01-03 PROCEDURE — 93005 ELECTROCARDIOGRAM TRACING: CPT

## 2022-01-03 PROCEDURE — 36415 COLL VENOUS BLD VENIPUNCTURE: CPT | Performed by: EMERGENCY MEDICINE

## 2022-01-03 PROCEDURE — 99291 CRITICAL CARE FIRST HOUR: CPT | Mod: 25

## 2022-01-03 PROCEDURE — 81000 URINALYSIS NONAUTO W/SCOPE: CPT | Performed by: EMERGENCY MEDICINE

## 2022-01-03 PROCEDURE — U0002 COVID-19 LAB TEST NON-CDC: HCPCS | Performed by: EMERGENCY MEDICINE

## 2022-01-03 PROCEDURE — 80053 COMPREHEN METABOLIC PANEL: CPT | Performed by: EMERGENCY MEDICINE

## 2022-01-03 PROCEDURE — 96365 THER/PROPH/DIAG IV INF INIT: CPT | Mod: 59

## 2022-01-03 PROCEDURE — 87086 URINE CULTURE/COLONY COUNT: CPT | Performed by: EMERGENCY MEDICINE

## 2022-01-03 PROCEDURE — 87077 CULTURE AEROBIC IDENTIFY: CPT | Performed by: EMERGENCY MEDICINE

## 2022-01-03 PROCEDURE — 83605 ASSAY OF LACTIC ACID: CPT | Mod: 91 | Performed by: EMERGENCY MEDICINE

## 2022-01-03 PROCEDURE — 85025 COMPLETE CBC W/AUTO DIFF WBC: CPT | Performed by: EMERGENCY MEDICINE

## 2022-01-03 PROCEDURE — 99292 CRITICAL CARE ADDL 30 MIN: CPT

## 2022-01-03 PROCEDURE — 63600175 PHARM REV CODE 636 W HCPCS: Performed by: EMERGENCY MEDICINE

## 2022-01-03 PROCEDURE — 25000003 PHARM REV CODE 250: Performed by: EMERGENCY MEDICINE

## 2022-01-03 PROCEDURE — 12000002 HC ACUTE/MED SURGE SEMI-PRIVATE ROOM

## 2022-01-03 PROCEDURE — 87088 URINE BACTERIA CULTURE: CPT | Performed by: EMERGENCY MEDICINE

## 2022-01-03 PROCEDURE — 87184 SC STD DISK METHOD PER PLATE: CPT | Performed by: EMERGENCY MEDICINE

## 2022-01-03 PROCEDURE — 87040 BLOOD CULTURE FOR BACTERIA: CPT | Mod: 59 | Performed by: EMERGENCY MEDICINE

## 2022-01-03 PROCEDURE — 87186 SC STD MICRODIL/AGAR DIL: CPT | Performed by: EMERGENCY MEDICINE

## 2022-01-03 RX ORDER — SODIUM CHLORIDE 9 MG/ML
INJECTION, SOLUTION INTRAVENOUS
Status: COMPLETED | OUTPATIENT
Start: 2022-01-03 | End: 2022-01-03

## 2022-01-03 RX ORDER — VANCOMYCIN HCL IN 5 % DEXTROSE 1G/250ML
1000 PLASTIC BAG, INJECTION (ML) INTRAVENOUS
Status: COMPLETED | OUTPATIENT
Start: 2022-01-03 | End: 2022-01-03

## 2022-01-03 RX ADMIN — SODIUM CHLORIDE: 0.9 INJECTION, SOLUTION INTRAVENOUS at 07:01

## 2022-01-03 RX ADMIN — VANCOMYCIN HYDROCHLORIDE 1000 MG: 1 INJECTION, POWDER, LYOPHILIZED, FOR SOLUTION INTRAVENOUS at 08:01

## 2022-01-03 RX ADMIN — PIPERACILLIN AND TAZOBACTAM 3.38 G: 3; .375 INJECTION, POWDER, LYOPHILIZED, FOR SOLUTION INTRAVENOUS; PARENTERAL at 07:01

## 2022-01-04 PROBLEM — E87.1 HYPONATREMIA: Status: ACTIVE | Noted: 2022-01-04

## 2022-01-04 PROBLEM — J95.851 VENTILATOR ASSOCIATED PNEUMONIA: Status: ACTIVE | Noted: 2021-12-27

## 2022-01-04 LAB
ALBUMIN SERPL BCP-MCNC: 2.7 G/DL (ref 3.5–5.2)
ALLENS TEST: ABNORMAL
ALLENS TEST: ABNORMAL
ALP SERPL-CCNC: 181 U/L (ref 55–135)
ALT SERPL W/O P-5'-P-CCNC: 35 U/L (ref 10–44)
ANION GAP SERPL CALC-SCNC: 12 MMOL/L (ref 8–16)
AST SERPL-CCNC: 18 U/L (ref 10–40)
BASOPHILS # BLD AUTO: 0.02 K/UL (ref 0–0.2)
BASOPHILS NFR BLD: 0.2 % (ref 0–1.9)
BILIRUB SERPL-MCNC: 1.1 MG/DL (ref 0.1–1)
BNP SERPL-MCNC: 29 PG/ML (ref 0–99)
BUN SERPL-MCNC: 17 MG/DL (ref 6–20)
CALCIUM SERPL-MCNC: 8.8 MG/DL (ref 8.7–10.5)
CHLORIDE SERPL-SCNC: 102 MMOL/L (ref 95–110)
CO2 SERPL-SCNC: 18 MMOL/L (ref 23–29)
CREAT SERPL-MCNC: 0.5 MG/DL (ref 0.5–1.4)
DELSYS: ABNORMAL
DELSYS: ABNORMAL
DIFFERENTIAL METHOD: ABNORMAL
EOSINOPHIL # BLD AUTO: 0.1 K/UL (ref 0–0.5)
EOSINOPHIL NFR BLD: 1.1 % (ref 0–8)
ERYTHROCYTE [DISTWIDTH] IN BLOOD BY AUTOMATED COUNT: 16.6 % (ref 11.5–14.5)
ERYTHROCYTE [SEDIMENTATION RATE] IN BLOOD BY WESTERGREN METHOD: 24 MM/H
ERYTHROCYTE [SEDIMENTATION RATE] IN BLOOD BY WESTERGREN METHOD: 24 MM/H
EST. GFR  (AFRICAN AMERICAN): >60 ML/MIN/1.73 M^2
EST. GFR  (NON AFRICAN AMERICAN): >60 ML/MIN/1.73 M^2
ETCO2: 15
ETCO2: 24
FIO2: 36
FIO2: 36
GLUCOSE SERPL-MCNC: 106 MG/DL (ref 70–110)
HCO3 UR-SCNC: 18.5 MMOL/L (ref 24–28)
HCO3 UR-SCNC: 19.5 MMOL/L (ref 24–28)
HCT VFR BLD AUTO: 27.6 % (ref 37–48.5)
HGB BLD-MCNC: 8.9 G/DL (ref 12–16)
IMM GRANULOCYTES # BLD AUTO: 0.04 K/UL (ref 0–0.04)
IMM GRANULOCYTES NFR BLD AUTO: 0.4 % (ref 0–0.5)
LACTATE SERPL-SCNC: 1.4 MMOL/L (ref 0.5–2.2)
LYMPHOCYTES # BLD AUTO: 0.9 K/UL (ref 1–4.8)
LYMPHOCYTES NFR BLD: 7.9 % (ref 18–48)
MAGNESIUM SERPL-MCNC: 1.6 MG/DL (ref 1.6–2.6)
MCH RBC QN AUTO: 28.2 PG (ref 27–31)
MCHC RBC AUTO-ENTMCNC: 32.2 G/DL (ref 32–36)
MCV RBC AUTO: 87 FL (ref 82–98)
MIN VOL: 11
MIN VOL: 7.88
MODE: ABNORMAL
MODE: ABNORMAL
MONOCYTES # BLD AUTO: 0.8 K/UL (ref 0.3–1)
MONOCYTES NFR BLD: 7.7 % (ref 4–15)
NEUTROPHILS # BLD AUTO: 8.9 K/UL (ref 1.8–7.7)
NEUTROPHILS NFR BLD: 82.7 % (ref 38–73)
NRBC BLD-RTO: 0 /100 WBC
PCO2 BLDA: 23.4 MMHG (ref 35–45)
PCO2 BLDA: 32.7 MMHG (ref 35–45)
PEEP: 5
PEEP: 5
PH SMN: 7.38 [PH] (ref 7.35–7.45)
PH SMN: 7.51 [PH] (ref 7.35–7.45)
PHOSPHATE SERPL-MCNC: 3.2 MG/DL (ref 2.7–4.5)
PIP: 25
PLATELET # BLD AUTO: 305 K/UL (ref 150–450)
PMV BLD AUTO: 10.2 FL (ref 9.2–12.9)
PO2 BLDA: 76 MMHG (ref 80–100)
PO2 BLDA: 91 MMHG (ref 80–100)
POC BE: -5 MMOL/L
POC BE: -6 MMOL/L
POC SATURATED O2: 97 % (ref 95–100)
POC SATURATED O2: 97 % (ref 95–100)
POC TCO2: 19 MMOL/L (ref 23–27)
POC TCO2: 21 MMOL/L (ref 23–27)
POCT GLUCOSE: 109 MG/DL (ref 70–110)
POCT GLUCOSE: 94 MG/DL (ref 70–110)
POTASSIUM SERPL-SCNC: 3.4 MMOL/L (ref 3.5–5.1)
PROT SERPL-MCNC: 6.9 G/DL (ref 6–8.4)
RBC # BLD AUTO: 3.16 M/UL (ref 4–5.4)
SAMPLE: ABNORMAL
SAMPLE: ABNORMAL
SITE: ABNORMAL
SITE: ABNORMAL
SODIUM SERPL-SCNC: 132 MMOL/L (ref 136–145)
SP02: 96
SP02: 98
VANCOMYCIN SERPL-MCNC: 8.3 UG/ML
VT: 350
VT: 450
WBC # BLD AUTO: 10.71 K/UL (ref 3.9–12.7)

## 2022-01-04 PROCEDURE — 99291 PR CRITICAL CARE, E/M 30-74 MINUTES: ICD-10-PCS | Mod: ,,, | Performed by: INTERNAL MEDICINE

## 2022-01-04 PROCEDURE — 99900022

## 2022-01-04 PROCEDURE — 94761 N-INVAS EAR/PLS OXIMETRY MLT: CPT

## 2022-01-04 PROCEDURE — 80053 COMPREHEN METABOLIC PANEL: CPT | Performed by: NURSE PRACTITIONER

## 2022-01-04 PROCEDURE — 99900035 HC TECH TIME PER 15 MIN (STAT)

## 2022-01-04 PROCEDURE — 36415 COLL VENOUS BLD VENIPUNCTURE: CPT | Performed by: EMERGENCY MEDICINE

## 2022-01-04 PROCEDURE — 94003 VENT MGMT INPAT SUBQ DAY: CPT

## 2022-01-04 PROCEDURE — 25000003 PHARM REV CODE 250: Performed by: HOSPITALIST

## 2022-01-04 PROCEDURE — 83605 ASSAY OF LACTIC ACID: CPT | Performed by: NURSE PRACTITIONER

## 2022-01-04 PROCEDURE — 27000221 HC OXYGEN, UP TO 24 HOURS

## 2022-01-04 PROCEDURE — 27000207 HC ISOLATION

## 2022-01-04 PROCEDURE — 25000003 PHARM REV CODE 250: Performed by: NURSE PRACTITIONER

## 2022-01-04 PROCEDURE — 25000003 PHARM REV CODE 250: Performed by: SURGERY

## 2022-01-04 PROCEDURE — 83735 ASSAY OF MAGNESIUM: CPT | Performed by: NURSE PRACTITIONER

## 2022-01-04 PROCEDURE — 83880 ASSAY OF NATRIURETIC PEPTIDE: CPT | Performed by: SURGERY

## 2022-01-04 PROCEDURE — 25000242 PHARM REV CODE 250 ALT 637 W/ HCPCS: Performed by: NURSE PRACTITIONER

## 2022-01-04 PROCEDURE — 36415 COLL VENOUS BLD VENIPUNCTURE: CPT | Performed by: NURSE PRACTITIONER

## 2022-01-04 PROCEDURE — 80202 ASSAY OF VANCOMYCIN: CPT | Performed by: EMERGENCY MEDICINE

## 2022-01-04 PROCEDURE — 85025 COMPLETE CBC W/AUTO DIFF WBC: CPT | Performed by: NURSE PRACTITIONER

## 2022-01-04 PROCEDURE — 84100 ASSAY OF PHOSPHORUS: CPT | Performed by: NURSE PRACTITIONER

## 2022-01-04 PROCEDURE — 63600175 PHARM REV CODE 636 W HCPCS: Performed by: NURSE PRACTITIONER

## 2022-01-04 PROCEDURE — C9399 UNCLASSIFIED DRUGS OR BIOLOG: HCPCS | Performed by: NURSE PRACTITIONER

## 2022-01-04 PROCEDURE — 99291 CRITICAL CARE FIRST HOUR: CPT | Mod: ,,, | Performed by: INTERNAL MEDICINE

## 2022-01-04 PROCEDURE — 94640 AIRWAY INHALATION TREATMENT: CPT

## 2022-01-04 PROCEDURE — 63600175 PHARM REV CODE 636 W HCPCS: Performed by: SURGERY

## 2022-01-04 PROCEDURE — 82803 BLOOD GASES ANY COMBINATION: CPT

## 2022-01-04 PROCEDURE — 99900026 HC AIRWAY MAINTENANCE (STAT)

## 2022-01-04 PROCEDURE — 20000000 HC ICU ROOM

## 2022-01-04 PROCEDURE — 94002 VENT MGMT INPAT INIT DAY: CPT

## 2022-01-04 PROCEDURE — 36600 WITHDRAWAL OF ARTERIAL BLOOD: CPT

## 2022-01-04 PROCEDURE — 63600175 PHARM REV CODE 636 W HCPCS: Performed by: HOSPITALIST

## 2022-01-04 RX ORDER — MIDODRINE HYDROCHLORIDE 2.5 MG/1
10 TABLET ORAL 3 TIMES DAILY
Status: DISCONTINUED | OUTPATIENT
Start: 2022-01-04 | End: 2022-01-15

## 2022-01-04 RX ORDER — ONDANSETRON 2 MG/ML
4 INJECTION INTRAMUSCULAR; INTRAVENOUS EVERY 8 HOURS PRN
Status: DISCONTINUED | OUTPATIENT
Start: 2022-01-04 | End: 2022-01-21 | Stop reason: HOSPADM

## 2022-01-04 RX ORDER — FAMOTIDINE 10 MG/ML
20 INJECTION INTRAVENOUS 2 TIMES DAILY
Status: DISCONTINUED | OUTPATIENT
Start: 2022-01-04 | End: 2022-01-21 | Stop reason: HOSPADM

## 2022-01-04 RX ORDER — POTASSIUM CHLORIDE 7.45 MG/ML
10 INJECTION INTRAVENOUS ONCE
Status: COMPLETED | OUTPATIENT
Start: 2022-01-04 | End: 2022-01-04

## 2022-01-04 RX ORDER — POTASSIUM CHLORIDE 7.45 MG/ML
10 INJECTION INTRAVENOUS ONCE
Status: DISCONTINUED | OUTPATIENT
Start: 2022-01-04 | End: 2022-01-04

## 2022-01-04 RX ORDER — SODIUM CHLORIDE 0.9 % (FLUSH) 0.9 %
10 SYRINGE (ML) INJECTION
Status: DISCONTINUED | OUTPATIENT
Start: 2022-01-04 | End: 2022-01-21 | Stop reason: HOSPADM

## 2022-01-04 RX ORDER — POLYETHYLENE GLYCOL 3350 17 G/17G
17 POWDER, FOR SOLUTION ORAL DAILY PRN
Status: DISCONTINUED | OUTPATIENT
Start: 2022-01-04 | End: 2022-01-21 | Stop reason: HOSPADM

## 2022-01-04 RX ORDER — IPRATROPIUM BROMIDE AND ALBUTEROL SULFATE 2.5; .5 MG/3ML; MG/3ML
3 SOLUTION RESPIRATORY (INHALATION) EVERY 4 HOURS
Status: DISCONTINUED | OUTPATIENT
Start: 2022-01-04 | End: 2022-01-21 | Stop reason: HOSPADM

## 2022-01-04 RX ORDER — MORPHINE SULFATE 2 MG/ML
2 INJECTION, SOLUTION INTRAMUSCULAR; INTRAVENOUS
Status: DISCONTINUED | OUTPATIENT
Start: 2022-01-04 | End: 2022-01-21 | Stop reason: HOSPADM

## 2022-01-04 RX ORDER — ENOXAPARIN SODIUM 100 MG/ML
40 INJECTION SUBCUTANEOUS EVERY 24 HOURS
Status: DISCONTINUED | OUTPATIENT
Start: 2022-01-04 | End: 2022-01-21 | Stop reason: HOSPADM

## 2022-01-04 RX ORDER — FAMOTIDINE 20 MG/1
20 TABLET, FILM COATED ORAL 2 TIMES DAILY
Status: DISCONTINUED | OUTPATIENT
Start: 2022-01-04 | End: 2022-01-04

## 2022-01-04 RX ORDER — SODIUM CHLORIDE 9 MG/ML
INJECTION, SOLUTION INTRAVENOUS CONTINUOUS
Status: DISCONTINUED | OUTPATIENT
Start: 2022-01-04 | End: 2022-01-10

## 2022-01-04 RX ORDER — MUPIROCIN 20 MG/G
OINTMENT TOPICAL 2 TIMES DAILY
Status: COMPLETED | OUTPATIENT
Start: 2022-01-04 | End: 2022-01-08

## 2022-01-04 RX ORDER — DEXMEDETOMIDINE HYDROCHLORIDE 4 UG/ML
0-1.4 INJECTION INTRAVENOUS CONTINUOUS
Status: DISCONTINUED | OUTPATIENT
Start: 2022-01-04 | End: 2022-01-06

## 2022-01-04 RX ORDER — CHLORHEXIDINE GLUCONATE ORAL RINSE 1.2 MG/ML
15 SOLUTION DENTAL 2 TIMES DAILY
Status: DISCONTINUED | OUTPATIENT
Start: 2022-01-04 | End: 2022-01-21 | Stop reason: HOSPADM

## 2022-01-04 RX ORDER — ACETAMINOPHEN 325 MG/1
650 TABLET ORAL EVERY 4 HOURS PRN
Status: DISCONTINUED | OUTPATIENT
Start: 2022-01-04 | End: 2022-01-21 | Stop reason: HOSPADM

## 2022-01-04 RX ADMIN — MUPIROCIN: 20 OINTMENT TOPICAL at 08:01

## 2022-01-04 RX ADMIN — FAMOTIDINE 20 MG: 10 INJECTION INTRAVENOUS at 09:01

## 2022-01-04 RX ADMIN — CHLORHEXIDINE GLUCONATE 0.12% ORAL RINSE 15 ML: 1.2 LIQUID ORAL at 08:01

## 2022-01-04 RX ADMIN — VANCOMYCIN HYDROCHLORIDE 750 MG: 750 INJECTION, POWDER, LYOPHILIZED, FOR SOLUTION INTRAVENOUS at 05:01

## 2022-01-04 RX ADMIN — SODIUM CHLORIDE: 0.9 INJECTION, SOLUTION INTRAVENOUS at 10:01

## 2022-01-04 RX ADMIN — IPRATROPIUM BROMIDE AND ALBUTEROL SULFATE 3 ML: 2.5; .5 SOLUTION RESPIRATORY (INHALATION) at 04:01

## 2022-01-04 RX ADMIN — SODIUM CHLORIDE: 0.9 INJECTION, SOLUTION INTRAVENOUS at 01:01

## 2022-01-04 RX ADMIN — SODIUM CHLORIDE 500 ML: 0.9 INJECTION, SOLUTION INTRAVENOUS at 04:01

## 2022-01-04 RX ADMIN — IPRATROPIUM BROMIDE AND ALBUTEROL SULFATE 3 ML: 2.5; .5 SOLUTION RESPIRATORY (INHALATION) at 08:01

## 2022-01-04 RX ADMIN — PIPERACILLIN AND TAZOBACTAM 4.5 G: 4; .5 INJECTION, POWDER, LYOPHILIZED, FOR SOLUTION INTRAVENOUS; PARENTERAL at 01:01

## 2022-01-04 RX ADMIN — IPRATROPIUM BROMIDE AND ALBUTEROL SULFATE 3 ML: 2.5; .5 SOLUTION RESPIRATORY (INHALATION) at 03:01

## 2022-01-04 RX ADMIN — SODIUM CHLORIDE 250 ML: 0.9 INJECTION, SOLUTION INTRAVENOUS at 02:01

## 2022-01-04 RX ADMIN — PIPERACILLIN AND TAZOBACTAM 4.5 G: 4; .5 INJECTION, POWDER, LYOPHILIZED, FOR SOLUTION INTRAVENOUS; PARENTERAL at 03:01

## 2022-01-04 RX ADMIN — FAMOTIDINE 20 MG: 10 INJECTION INTRAVENOUS at 03:01

## 2022-01-04 RX ADMIN — MORPHINE SULFATE 2 MG: 2 INJECTION, SOLUTION INTRAMUSCULAR; INTRAVENOUS at 06:01

## 2022-01-04 RX ADMIN — SODIUM CHLORIDE 5 ML/HR: 0.9 INJECTION, SOLUTION INTRAVENOUS at 04:01

## 2022-01-04 RX ADMIN — SODIUM CHLORIDE 500 ML: 0.9 INJECTION, SOLUTION INTRAVENOUS at 03:01

## 2022-01-04 RX ADMIN — MIDODRINE HYDROCHLORIDE 10 MG: 5 TABLET ORAL at 03:01

## 2022-01-04 RX ADMIN — POTASSIUM CHLORIDE 10 MEQ: 7.46 INJECTION, SOLUTION INTRAVENOUS at 06:01

## 2022-01-04 RX ADMIN — IPRATROPIUM BROMIDE AND ALBUTEROL SULFATE 3 ML: 2.5; .5 SOLUTION RESPIRATORY (INHALATION) at 11:01

## 2022-01-04 RX ADMIN — MUPIROCIN: 20 OINTMENT TOPICAL at 09:01

## 2022-01-04 RX ADMIN — CHLORHEXIDINE GLUCONATE 0.12% ORAL RINSE 15 ML: 1.2 LIQUID ORAL at 09:01

## 2022-01-04 RX ADMIN — DEXMEDETOMIDINE HYDROCHLORIDE 0.4 MCG/KG/HR: 4 INJECTION INTRAVENOUS at 12:01

## 2022-01-04 RX ADMIN — ENOXAPARIN SODIUM 40 MG: 40 INJECTION SUBCUTANEOUS at 04:01

## 2022-01-04 RX ADMIN — MIDODRINE HYDROCHLORIDE 10 MG: 5 TABLET ORAL at 09:01

## 2022-01-04 RX ADMIN — MIDODRINE HYDROCHLORIDE 10 MG: 5 TABLET ORAL at 08:01

## 2022-01-04 RX ADMIN — SODIUM CHLORIDE 500 ML: 0.9 INJECTION, SOLUTION INTRAVENOUS at 02:01

## 2022-01-04 RX ADMIN — PIPERACILLIN AND TAZOBACTAM 4.5 G: 4; .5 INJECTION, POWDER, LYOPHILIZED, FOR SOLUTION INTRAVENOUS; PARENTERAL at 10:01

## 2022-01-04 NOTE — SUBJECTIVE & OBJECTIVE
Past Medical History:   Diagnosis Date    Anoxic brain damage 07/2020    Bedbound 07/2020    Cardiac arrest 07/2020    Cirrhosis     GERD (gastroesophageal reflux disease)     Hemangioma of intra-abdominal structure     Hypertension     Nursing home resident     Protein calorie malnutrition     Pulmonary embolus     Respiratory distress     S/P percutaneous endoscopic gastrostomy (PEG) tube placement 07/2020    Total self-care deficit 07/2020    Tracheostomy dependence     7/2020       Past Surgical History:   Procedure Laterality Date    PEG W/TRACHEOSTOMY PLACEMENT  07/27/2020    SKIN GRAFT      buttocks       Review of patient's allergies indicates:  No Known Allergies    No current facility-administered medications on file prior to encounter.     Current Outpatient Medications on File Prior to Encounter   Medication Sig    acetaminophen (TYLENOL) 325 MG tablet 2 tablets (650 mg total) by Per G Tube route every 4 (four) hours as needed for Pain or Temperature greater than (100.4F).    acetylcysteine 100 mg/ml, 10%, (MUCOMYST) 100 mg/mL (10 %) nebulizer solution Take 4 mLs by nebulization every 8 (eight) hours.    albuterol-ipratropium (DUO-NEB) 2.5 mg-0.5 mg/3 mL nebulizer solution Take 3 mLs by nebulization every 6 (six) hours. Rescue    clonazePAM (KLONOPIN) 0.5 MG tablet 1 tablet (0.5 mg total) by Per G Tube route 3 (three) times daily.    ergocalciferol (ERGOCALCIFEROL) 50,000 unit Cap Take 50,000 Units by mouth every 7 days.    ferrous sulfate 300 mg (60 mg iron)/5 mL syrup Take 300 mg by mouth As instructed. Every other day    midodrine (PROAMATINE) 10 MG tablet 1 tablet (10 mg total) by Per G Tube route 3 (three) times daily.    polyethylene glycol (GLYCOLAX) 17 gram PwPk Take by mouth daily as needed.    sulfamethoxazole-trimethoprim 800-160mg (BACTRIM DS) 800-160 mg Tab 1 tablet by Per G Tube route 2 (two) times daily.     Family History     Problem Relation (Age of Onset)    No  Known Problems Mother, Father        Tobacco Use    Smoking status: Never Smoker    Smokeless tobacco: Never Used   Substance and Sexual Activity    Alcohol use: Not Currently    Drug use: Not Currently    Sexual activity: Not Currently     Review of Systems   Unable to perform ROS: Patient nonverbal     Objective:     Vital Signs (Most Recent):  Temp: 98 °F (36.7 °C) (01/03/22 1908)  Pulse: 108 (01/03/22 2203)  Resp: 20 (01/03/22 1908)  BP: 98/66 (01/03/22 2202)  SpO2: 95 % (01/03/22 2203) Vital Signs (24h Range):  Temp:  [98 °F (36.7 °C)] 98 °F (36.7 °C)  Pulse:  [108-148] 108  Resp:  [20] 20  SpO2:  [92 %-99 %] 95 %  BP: ()/(60-80) 98/66     Weight: 63.5 kg (140 lb)  Body mass index is 27.34 kg/m².    Physical Exam  Vitals and nursing note reviewed.   Constitutional:       General: She is not in acute distress.     Appearance: She is well-developed and well-nourished. She is ill-appearing.      Comments: Chronic trach/vent dependent   HENT:      Head: Normocephalic and atraumatic.      Mouth/Throat:      Mouth: Mucous membranes are dry.   Cardiovascular:      Rate and Rhythm: Regular rhythm. Tachycardia present.   Pulmonary:      Effort: Pulmonary effort is normal. No respiratory distress.      Breath sounds: Rhonchi present.   Abdominal:      General: Bowel sounds are normal.      Palpations: Abdomen is soft.      Tenderness: There is no abdominal tenderness.      Comments: g tube in place   Musculoskeletal:      Cervical back: Normal range of motion and neck supple.      Comments: Contractures to hands   Skin:     General: Skin is warm and dry.   Neurological:      GCS: GCS eye subscore is 1. GCS verbal subscore is 1. GCS motor subscore is 1.   Psychiatric:      Comments: Unable to assess             Significant Labs:   All pertinent labs within the past 24 hours have been reviewed.  Bilirubin:   Recent Labs   Lab 12/27/21  0038 01/03/22 1918   BILITOT 1.0 1.1*     CBC:   Recent Labs   Lab  01/03/22 1918   WBC 14.01*   HGB 11.8*   HCT 36.5*        CMP:   Recent Labs   Lab 01/03/22 1918   *   K 3.6   CL 92*   CO2 18*   *   BUN 21*   CREATININE 0.6   CALCIUM 9.5   PROT 8.4   ALBUMIN 3.2*   BILITOT 1.1*   ALKPHOS 222*   AST 25   ALT 46*   ANIONGAP 13   EGFRNONAA >60     Lactic Acid:   Recent Labs   Lab 01/03/22  1950   LACTATE 2.6*     Urine Studies:   Recent Labs   Lab 01/03/22 1943   COLORU Yellow   APPEARANCEUA Clear   PHUR 8.0   SPECGRAV 1.015   PROTEINUA Trace*   GLUCUA Negative   KETONESU Negative   BILIRUBINUA Negative   OCCULTUA 1+*   NITRITE Negative   UROBILINOGEN Negative   LEUKOCYTESUR 3+*   RBCUA 1   WBCUA 24*   BACTERIA Many*   SQUAMEPITHEL 2       Significant Imaging:   CXR:Worsening airspace opacities in bilateral lungs, suspicious for multifocal pneumonia

## 2022-01-04 NOTE — PROGRESS NOTES
Pharmacokinetic Assessment Follow Up: IV Vancomycin    Vancomycin serum concentration assessment(s):    The random level was drawn correctly and can be used to guide therapy at this time. The measurement is below the desired definitive target range of 15 to 20 mcg/mL.    Vancomycin Regimen Plan:    Change regimen to Vancomycin 750 mg IV x1 with next serum vancomycin concentration measured at 0900 prior to next dose on 1/5/22.    Drug levels (last 3 results):  Recent Labs   Lab Result Units 01/04/22  1254   Vancomycin, Random ug/mL 8.3       Pharmacy will continue to follow and monitor vancomycin.    Please contact pharmacy at extension [phone] for questions regarding this assessment.    Thank you for the consult,   Bradford Spencer, PharmD  [phone]         Patient brief summary:  Genna Felix is a 61 y.o. female initiated on antimicrobial therapy with IV Vancomycin for treatment of lower respiratory infection    The patient's current regimen is vancomycin pulse dosing.    Drug Allergies:   Review of patient's allergies indicates:  No Known Allergies    Actual Body Weight:   65.7 kg (144 lb 13.5 oz)    Renal Function:   Estimated Creatinine Clearance: 102.5 mL/min (based on SCr of 0.5 mg/dL).,     Dialysis Method (if applicable):  N/A    CBC (last 72 hours):  Recent Labs   Lab Result Units 01/03/22 1918 01/04/22  0350   WBC K/uL 14.01* 10.71   Hemoglobin g/dL 11.8* 8.9*   Hematocrit % 36.5* 27.6*   Platelets K/uL 346 305   Gran % % 87.5* 82.7*   Lymph % % 5.6* 7.9*   Mono % % 5.1 7.7   Eosinophil % % 1.1 1.1   Basophil % % 0.2 0.2   Differential Method  Automated Automated       Metabolic Panel (last 72 hours):  Recent Labs   Lab Result Units 01/03/22 1918 01/03/22  1943 01/04/22  0350   Sodium mmol/L 123*  --  132*   Potassium mmol/L 3.6  --  3.4*   Chloride mmol/L 92*  --  102   CO2 mmol/L 18*  --  18*   Glucose mg/dL 148*  --  106   Glucose, UA   --  Negative  --    BUN mg/dL 21*  --  17   Creatinine mg/dL 0.6   --  0.5   Albumin g/dL 3.2*  --  2.7*   Total Bilirubin mg/dL 1.1*  --  1.1*   Alkaline Phosphatase U/L 222*  --  181*   AST U/L 25  --  18   ALT U/L 46*  --  35   Magnesium mg/dL  --   --  1.6   Phosphorus mg/dL  --   --  3.2       Vancomycin Administrations:  vancomycin given in the last 96 hours                     vancomycin in dextrose 5 % 1 gram/250 mL IVPB 1,000 mg (mg) 1,000 mg New Bag 01/03/22 2010                    Microbiologic Results:  Microbiology Results (last 7 days)       Procedure Component Value Units Date/Time    Blood culture x two cultures. Draw prior to antibiotics. [[id number]] Collected: 01/03/22 1950    Order Status: Completed Specimen: Blood Updated: 01/04/22 0715     Blood Culture, Routine No Growth to date    Narrative:      Aerobic and anaerobic    Blood culture x two cultures. Draw prior to antibiotics. [[id number]] Collected: 01/03/22 1950    Order Status: Completed Specimen: Blood Updated: 01/04/22 0715     Blood Culture, Routine No Growth to date    Narrative:      Aerobic and anaerobic    Culture, Respiratory with Gram Stain [[id number]]     Order Status: No result Specimen: Respiratory     Urine culture [[id number]] Collected: 01/03/22 1943    Order Status: No result Specimen: Urine Updated: 01/03/22 2030

## 2022-01-04 NOTE — ASSESSMENT & PLAN NOTE
"This patient does have evidence of infective focus  My overall impression is sepsis. Vital signs were reviewed and noted in progress note.  Antibiotics given-   Antibiotics (From admission, onward)            Start     Stop Route Frequency Ordered    01/04/22 0400  piperacillin-tazobactam 4.5 g in dextrose 5 % 100 mL IVPB (ready to mix system)         -- IV Every 8 hours (non-standard times) 01/03/22 2141 01/03/22 2241  vancomycin - pharmacy to dose  (vancomycin IVPB)        "And" Linked Group Details    -- IV pharmacy to manage frequency 01/03/22 2141        Cultures were taken-   Microbiology Results (last 7 days)     Procedure Component Value Units Date/Time    Blood culture x two cultures. Draw prior to antibiotics. [[id number]] Collected: 01/03/22 1950    Order Status: Sent Specimen: Blood Updated: 01/04/22 0034    Blood culture x two cultures. Draw prior to antibiotics. [[id number]] Collected: 01/03/22 1950    Order Status: Sent Specimen: Blood Updated: 01/04/22 0034    Urine culture [[id number]] Collected: 01/03/22 1943    Order Status: No result Specimen: Urine Updated: 01/03/22 2030        Latest lactate reviewed, they are-  Recent Labs   Lab 01/03/22 1950 01/03/22  2315   LACTATE 2.6* 2.4*       Organ dysfunction indicated by tachycardia, tachypnea  Source- respiratory    Source control Achieved by- IV abx    "

## 2022-01-04 NOTE — PROGRESS NOTES
Pharmacokinetic Initial Assessment: IV Vancomycin    Assessment/Plan:    Initiate intravenous vancomycin with loading dose of 1000 mg once with subsequent doses when random concentrations are less than 20 mcg/mL  Desired empiric serum trough concentration is 15 to 20 mcg/mL  Draw vancomycin random level on 1/4 at 1300.  Pharmacy will continue to follow and monitor vancomycin.      Please contact pharmacy at extension [phone] with any questions regarding this assessment.     Thank you for the consult,   Jitendra Carlisle       Patient brief summary:  Genna Felix is a 61 y.o. female initiated on antimicrobial therapy with IV Vancomycin for treatment of suspected lower respiratory infection    Drug Allergies:   Review of patient's allergies indicates:  No Known Allergies    Actual Body Weight:   63.5 kg    Renal Function:   Estimated Creatinine Clearance: 84 mL/min (based on SCr of 0.6 mg/dL).,     Dialysis Method (if applicable):  N/A    CBC (last 72 hours):  Recent Labs   Lab Result Units 01/03/22 1918   WBC K/uL 14.01*   Hemoglobin g/dL 11.8*   Hematocrit % 36.5*   Platelets K/uL 346   Gran % % 87.5*   Lymph % % 5.6*   Mono % % 5.1   Eosinophil % % 1.1   Basophil % % 0.2   Differential Method  Automated       Metabolic Panel (last 72 hours):  Recent Labs   Lab Result Units 01/03/22 1918 01/03/22 1943   Sodium mmol/L 123*  --    Potassium mmol/L 3.6  --    Chloride mmol/L 92*  --    CO2 mmol/L 18*  --    Glucose mg/dL 148*  --    Glucose, UA   --  Negative   BUN mg/dL 21*  --    Creatinine mg/dL 0.6  --    Albumin g/dL 3.2*  --    Total Bilirubin mg/dL 1.1*  --    Alkaline Phosphatase U/L 222*  --    AST U/L 25  --    ALT U/L 46*  --        Drug levels (last 3 results):  No results for input(s): VANCOMYCINRA, VANCOMYCINPE, VANCOMYCINTR in the last 72 hours.    Microbiologic Results:  Microbiology Results (last 7 days)       Procedure Component Value Units Date/Time    Urine culture [[id number]] Collected: 01/03/22 1943     Order Status: No result Specimen: Urine Updated: 01/03/22 2030    Blood culture x two cultures. Draw prior to antibiotics. [[id number]] Collected: 01/03/22 1950    Order Status: Sent Specimen: Blood Updated: 01/03/22 1951    Blood culture x two cultures. Draw prior to antibiotics. [[id number]] Collected: 01/03/22 1950    Order Status: Sent Specimen: Blood Updated: 01/03/22 1951

## 2022-01-04 NOTE — PT/OT/SLP PROGRESS
Physical Therapy      Patient Name:  Genna Felix   MRN:  [id number]    Patient not seen today secondary to discussed with RN who states patient is unable to follow commands and extremities are contracted. She is a resident at [facility] and appers to be at functional baseline. PT orders discontinued.

## 2022-01-04 NOTE — ASSESSMENT & PLAN NOTE
Failed OP abx  Pt vent dependent-consult pulmonology for vent management  IV zosyn/IV vanc  Blood and sputum cx  Nebulizer treatments

## 2022-01-04 NOTE — EICU
EICU BRIEF INITIAL EVALUATION:       HISTORY:     61-year-old woman brought from nursing facility for tachycardia  History of anoxic brain injury, peg, tracheostomy, PE, hypertension, GERD, stage IV decubitus ulcer.  She was discharged on 12/27 on Bactrim after hospitalization for pneumonia .  She is COVID negative      DVT prophylaxis enoxaparin, SCDs  GI prophylaxis famotidine    Echocardiogram in July 2020 with normal LV systolic and diastolic function but moderate pulmonary hypertension and dilated RV with moderately impaired function    Camera  BP 80/56 (64), P 78, R 24, sat 95, end-tidal 15  On ventilator via tracheostomy  Vent review on 450/24/5/36%  Ideal body weight 45 kilos  Tidal volume decreased to 350.  Recheck ABG in 1 hour  .    CAMERA ASSESSMENT: Yes      TELEMETRY: Reviewed      NOTES: Reviewed     LABS: Reviewed      IMAGING: Personally reviewed.      DISCUSSED with bedside nurse     ASSESSMENT AND PLAN:        Sepsis-urosepsis and/or healthcare associated pneumonia.  continue vanco Zosyn.  Check cultures    Pulmonary hypertension-blood pressure most likely low due to volume depletion.  Will bolus to maintain adequate preload    Chronic respiratory failure-continue ventilator support.  Vent bundle ordered    Malnutrition-nutrition consult already placed-should start tube feeds    Decubitus ulcer-logroll Q 2. Wound care consulted    Hyponatremia-most likely hypovolemic hyponatremia.  Continue hydration with normal saline.  BNP added to a.m. labs    B. Ziggy Campoverde MD  eICU Attending  [phone]

## 2022-01-04 NOTE — HPI
Genna Felix is a 61 year old female with a past medical history of anoxic brain injury s/p G tube and tracheostomy, PE, HTN, GERD who presented from [place] for further evaluation of tachycardia.  She was discharged from this facility on 12/27/21 with a diagnosis of pneumonia with rx for bactrim.  In the ED she is found to be tachycardic with WBC 14K, lactic acid 2.6 and evidence of UTI.  CXR reveals worsening multifocal pneumonia.  Pt is nonverbal and cannot contribute to history of present illness, therefore further information is limited.  While in the ED, IV zosyn was initiated and she was give an IVF bolus.  She will be admitted to the service of hospital medicine for continued monitoring and treatment.

## 2022-01-04 NOTE — PLAN OF CARE
[facility]  Initial Discharge Assessment       Primary Care Provider: Primary Doctor No    Admission Diagnosis: Tachycardia [R00.0]  Sepsis [A41.9]    Admission Date: 1/3/2022  Expected Discharge Date:     Discharge Barriers Identified: None    Payor: MEDICARE / Plan: MEDICARE PART A & B / Product Type: Government /     Extended Emergency Contact Information  Primary Emergency Contact: Amelia Hidalgo  Mobile Phone: [phone]  Relation: Sister  Preferred language: English   needed? No  Secondary Emergency Contact: Starr Felix  Mobile Phone: [phone]  Relation: Daughter    Discharge Plan A: Return to nursing home  Discharge Plan B: Return to Nursing Home    No Pharmacies Listed     Patient resident at [facility] and will return upon discharge.    Initial Assessment (most recent)     Adult Discharge Assessment - 01/04/22 1633        Discharge Assessment    Assessment Type Discharge Planning Assessment     Confirmed/corrected address, phone number and insurance Yes     Confirmed Demographics Correct on Facesheet     Source of Information health record     Communicated [place] with patient/caregiver Date not available/Unable to determine     Lives With facility resident     Facility Arrived From: [facility]     Do you expect to return to your current living situation? Yes     Do you have help at home or someone to help you manage your care at home? Yes     Who are your caregiver(s) and their phone number(s)? facility     Discharge Plan A Return to nursing home     Discharge Plan B Return to Nursing Home     DME Needed Upon Discharge  none     Discharge Barriers Identified None

## 2022-01-04 NOTE — EICU
eICU Physician Virtual/Remote Brief Evaluation Note      Telephone call RN  Patient agitated and tachypneic despite Precedex drip  Receiving potassium IV  Chart reviewed, patient observed, discussed with RN  /64, P 100, RR 38, sat 94  Restless, pulling at bed clothes  K 3.4, creatinine 0.5  Already has enteral potassium replacement ordered  IV potassium discontinued  Morphine 2 mg IV q.3h p.r.n. pain, air hunger, agitation      ANSELMO Campoverde MD  eICU Attending  [phone]    This report has been created through the use of [employer] dictation software. Typographical and content errors may occur with this process. While efforts are made to detect and correct such errors, in some cases errors will persist. For this reason, wording in this document should be considered in the proper context and not strictly verbatim

## 2022-01-04 NOTE — CONSULTS
[facility]  Adult Nutrition  Consult Note    SUMMARY    Intervention: collaboration with care providers      Recommendation:  1) Start TF As soon as medically able - same as TF PTA   Isosource 1.5 @ 20 ml/hr advancing to 50 ml/hr + 110 ml flush q 4 hr + beneprotein BID +Benny BID   ( provides 1800 kcal ( > 100% EEN), 81 g protein + bene ( 94% EPN), 912 ml free water)    2) weigh weekly     Goals: 1) TF advanced in < 4 days  Nutrition Goal Status: new  Communication of RD Recs: reviewed with physician (POC, sticky note, second sign)     Assessment and Plan     Inadequate energy/protein intake  R/t NPO, emesis  As evidenced by inadequate TF infustion x at least 2-3 days, stage 4 coccyx wound   Intervention: above  new     Malnutrition Assessment  Skin (Micronutrient):  (Hardik = 12, stage 4 coccyx wound, elbow wound)  Teeth (Micronutrient):  (WDL)   Micronutrient Evaluation: suspected deficiency  Micronutrient Evaluation Comments: Na, K, check iron, check vitamin C and zinc              Edema (Fluid Accumulation): 0-->no edema present   Subcutaneous Fat Loss (Final Summary): well nourished  Muscle Loss Evaluation (Final Summary): well nourished       Reason for Assessment     Reason For Assessment: consult  Diagnosis:  sepsis  Relevant Medical History: recurrent pneumonia/ UTI, [place] resident, HTN, GERD, PEG/trach, anoxic brain injury, cardiac arrest  Interdisciplinary Rounds: attended  General Information Comments: 60 y/o female admitted with sepsis and pneumonia s/p emesis x 2 days PTA. Has trach and PEG, was receiving Isosource 1.5 @ 50 ml/hr + 250 ml flush q 6 hr at [place] + arginaid packet BID. NFPE done 1/4/21 no wasting seen. NPO x 1 day inpatient. Wt fluctuations x 1 year.    Nutrition Discharge Planning: To be determined- TF PTA Isosource 1.5 @ 20 ml/hr advancing to goal of 50 ml/hr + 250 ml flush q 6 hr + Benny or arginaid BID     Nutrition Risk Screen     Nutrition Risk Screen: tube  feeding or parenteral nutrition,dysphagia or difficulty swallowing     Nutrition/Diet History     Patient Reported Diet/Restrictions/Preferences: no oral intake  Spiritual, Cultural Beliefs, Nondenominational Practices, Values that Affect Care: no  Food Allergies: NKFA  Factors Affecting Nutritional Intake: NPO,difficulty/impaired swallowing     Anthropometrics  Height Method: Stated  Height: 5'  Height (inches): 60 in  Weight Method: Bed Scale  Weight: 65.7 kg 21,   Weight (lb): 144 lb  Ideal Body Weight (IBW), Female: 100 lb  BMI (Calculated): 28 kg/m2  BMI Grade: 25 - 29.9 - overweight  Weight Loss: unintentional  Usual Body Weight (UBW), k.1 kg (20)  59.4 kg (10/25/21), 62.3 kg 21     Lab/Procedures/Meds     Pertinent Labs Reviewed: reviewed  BMP  Lab Results   Component Value Date     (L) 2022    K 3.4 (L) 2022     2022    CO2 18 (L) 2022    BUN 17 2022    CREATININE 0.5 2022    CALCIUM 8.8 2022    ANIONGAP 12 2022    ESTGFRAFRICA >60 2022    EGFRNONAA >60 2022     Recent Labs   Lab 22  1048   POCTGLUCOSE 94     No results found for: IRON, TIBC, FERRITIN, SATURATEDIRO    Pertinent Medications Reviewed: reviewed  KCl, NS @ 100 ml/hr, zofran, polyethylene glycol, Kbicarb     Estimated/Assessed Needs     Weight Used For Calorie Calculations: 65.7 kg  Energy Calorie Requirements (kcal): MSJ ( x 1.25) = 6425-5646 kcal  [facility]  Protein Requirements: 1.5 g protein/kg ( wound/ICU) = 98 g  Weight Used For Protein Calculations: 65.7 kg  Fluid Requirements (mL): 1400 ml or per MD  Estimated Fluid Requirement Method: RDA Method  CHO Requirement: N/A        Nutrition Prescription Ordered     Current Diet Order: NPO      Evaluation of Received Nutrient/Fluid Intake     Energy Calories Required: not meeting needs  Protein Required: not meeting needs  Fluid Required: exceeding needs  IVF @ 100 ml/hr  Tolerance:  NPO  % Intake of Estimated Energy Needs: 0%  % Meal Intake:NPO     Nutrition Risk     Level of Risk/Frequency of Follow-up: moderate 2 x weekly        Monitor and Evaluation     Food and Nutrient Intake: energy intake  Food and Nutrient Adminstration: enteral and parenteral nutrition administration  Anthropometric Measurements: weight  Biochemical Data, Medical Tests and Procedures: electrolyte and renal panel,gastrointestinal profile,glucose/endocrine profile  Nutrition-Focused Physical Findings: overall appearance         Nutrition Follow-Up     RD Follow-up?: Yes

## 2022-01-04 NOTE — ASSESSMENT & PLAN NOTE
Patient's anemia is currently controlled. S/p 0 units of PRBCs.  Current CBC reviewed-   Lab Results   Component Value Date    HGB 11.8 (L) 01/03/2022    HCT 36.5 (L) 01/03/2022    MCV 87 01/03/2022     01/03/2022     Monitor serial CBC and transfuse if patient becomes hemodynamically unstable, symptomatic or H/H drops below 7/21.        22-Dec-2017 20:24

## 2022-01-04 NOTE — PLAN OF CARE
Intervention: collaboration with care providers      Recommendation:  1) Start TF As soon as medically able - same as TF PTA   Isosource 1.5 @ 20 ml/hr advancing to 50 ml/hr + 110 ml flush q 4 hr + beneprotein BID +Benny BID   ( provides 1800 kcal ( > 100% EEN), 81 g protein + bene ( 94% EPN), 912 ml free water)    2) weigh weekly     Goals: 1) TF advanced in < 4 days  Nutrition Goal Status: new  Communication of RD Recs: reviewed with physician (POC, sticky note, second sign)

## 2022-01-04 NOTE — PROGRESS NOTES
"Patient is on Zosyn 3.375 g IV Q6h over 30 minutes (intermittent infusion). Due to the patient's current diagnosis, zosyn via extended infusion provides better clinical outcomes regarding decreased mortality and decreased length of stay in comparison to intermittent infusion. Zosyn dose will be adjusted to Zosyn 4.5 g IV Q8h over 4 hours. Per pharmacy protocol, Zosyn can be adjusted automatically. Providers can override adjustment by re-ordering intermittent infusion with "dispense as written" in the administration instructions.     Jitendra Carlisle  Pharm.D    [facility]    [phone]    "

## 2022-01-04 NOTE — SUBJECTIVE & OBJECTIVE
Interval History: Seen by pulmonology. Sputum culture pending. Has not required pressor support yet, will need central line if does 2/2 too contracted for PICC.     Review of Systems   Unable to perform ROS: Patient nonverbal     Objective:     Vital Signs (Most Recent):  Temp: (!) 100.4 °F (38 °C) (01/04/22 1200)  Pulse: (!) 111 (01/04/22 1300)  Resp: (!) 32 (01/04/22 1300)  BP: (!) 89/50 (01/04/22 1300)  SpO2: 96 % (01/04/22 1300) Vital Signs (24h Range):  Temp:  [93.56 °F (34.2 °C)-100.4 °F (38 °C)] 100.4 °F (38 °C)  Pulse:  [] 111  Resp:  [20-72] 32  SpO2:  [92 %-100 %] 96 %  BP: ()/(42-80) 89/50     Weight: 65.7 kg (144 lb 13.5 oz)  Body mass index is 28.29 kg/m².    Intake/Output Summary (Last 24 hours) at 1/4/2022 1330  Last data filed at 1/4/2022 1105  Gross per 24 hour   Intake 2937.85 ml   Output 200 ml   Net 2737.85 ml      Physical Exam  Vitals and nursing note reviewed.   Constitutional:       General: She is not in acute distress.     Appearance: She is well-developed. She is ill-appearing.      Comments: Chronic trach/vent dependent   HENT:      Head: Normocephalic and atraumatic.      Mouth/Throat:      Mouth: Mucous membranes are dry.   Cardiovascular:      Rate and Rhythm: Regular rhythm. Tachycardia present.   Pulmonary:      Effort: Pulmonary effort is normal. No respiratory distress.      Breath sounds: Rhonchi present.   Abdominal:      General: Bowel sounds are normal.      Palpations: Abdomen is soft.      Tenderness: There is no abdominal tenderness.      Comments: g tube in place   Musculoskeletal:      Cervical back: Normal range of motion and neck supple.      Comments: Contractures to hands   Skin:     General: Skin is warm and dry.   Neurological:      GCS: GCS eye subscore is 1. GCS verbal subscore is 1. GCS motor subscore is 1.   Psychiatric:      Comments: Unable to assess         Significant Labs: All pertinent labs within the past 24 hours have been  reviewed.    Significant Imaging: I have reviewed all pertinent imaging results/findings within the past 24 hours.

## 2022-01-04 NOTE — PROGRESS NOTES
Consulted for picc, pt is contracted to left arm, right arm extends enough for insertion, however, I attempted right brachial vein in multiple areas, unable to thread guidewire up vein. Pt is not a picc/midline candidate. Please consult for TLC

## 2022-01-04 NOTE — ED NOTES
Genna Felix presents to the ED from [place] for evaluation of vomiting and tachycardia.  Upon arrival to ED patient with HR in upper 140s.  Dried vomit in hair and on gown.  Scleral redness present.  Patient jumps with every physical touch.  [place] and PEG tube in place.  Will continue to monitor.

## 2022-01-04 NOTE — ASSESSMENT & PLAN NOTE
Patient's anemia is currently controlled. S/p 0 units of PRBCs.  Current CBC reviewed-   Lab Results   Component Value Date    HGB 8.9 (L) 01/04/2022    HCT 27.6 (L) 01/04/2022    MCV 87 01/04/2022     01/04/2022     Monitor serial CBC and transfuse if patient becomes hemodynamically unstable, symptomatic or H/H drops below 7/21.

## 2022-01-04 NOTE — PLAN OF CARE
POC reviewed.   Pt MAP < 65, 250 ml bolus and 500 ml bolus x 2 given, NP aware.  Chronic Vent / trach  FIO2 at 36% , peep 5  Rectal temperature 93.6, warming blanket applied. NP Aware   Sacrum and left elbow wounds, wound care consult ordered.   SCD and cushion boots applied   Contact isolation maintained.             Problem: Communication Impairment (Mechanical Ventilation, Invasive)  Goal: Effective Communication  Outcome: Ongoing, Progressing     Problem: Device-Related Complication Risk (Mechanical Ventilation, Invasive)  Goal: Optimal Device Function  Outcome: Ongoing, Progressing     Problem: Inability to Wean (Mechanical Ventilation, Invasive)  Goal: Mechanical Ventilation Liberation  Outcome: Ongoing, Progressing     Problem: Nutrition Impairment (Mechanical Ventilation, Invasive)  Goal: Optimal Nutrition Delivery  Outcome: Ongoing, Progressing     Problem: Skin and Tissue Injury (Mechanical Ventilation, Invasive)  Goal: Absence of Device-Related Skin and Tissue Injury  Outcome: Ongoing, Progressing     Problem: Ventilator-Induced Lung Injury (Mechanical Ventilation, Invasive)  Goal: Absence of Ventilator-Induced Lung Injury  Outcome: Ongoing, Progressing     Problem: Communication Impairment (Artificial Airway)  Goal: Effective Communication  Outcome: Ongoing, Progressing     Problem: Device-Related Complication Risk (Artificial Airway)  Goal: Optimal Device Function  Outcome: Ongoing, Progressing     Problem: Skin and Tissue Injury (Artificial Airway)  Goal: Absence of Device-Related Skin or Tissue Injury  Outcome: Ongoing, Progressing

## 2022-01-04 NOTE — ASSESSMENT & PLAN NOTE
Chronic, stable.  Latest blood pressure and vitals reviewed-   Temp:  [93.56 °F (34.2 °C)-100.4 °F (38 °C)]   Pulse:  []   Resp:  [20-72]   BP: ()/(42-80)   SpO2:  [92 %-100 %] .   Home meds for hypertension were reviewed and noted below.   No chronic antihypertensives noted. Review of home meds reveals midodrine which has been reordered.      Will utilize p.r.n. blood pressure medication only if patient's blood pressure greater than  180/110 and she develops symptoms such as worsening chest pain or shortness of breath.

## 2022-01-04 NOTE — CARE UPDATE
01/04/22 0806   Patient Assessment/Suction   Level of Consciousness (AVPU) responds to pain   Respiratory Effort Normal;Unlabored   Expansion/Accessory Muscles/Retractions expansion symmetric;no retractions;no use of accessory muscles   All Lung Fields Breath Sounds diminished;wheezes, expiratory   Rhythm/Pattern, Respiratory assisted mechanically   Cough Frequency with stimulation   Cough Type assisted   Suction Method tracheal   $ Suction Charges Inline Suction Procedure Stat Charge   Secretions Amount small   Secretions Color yellow;pale   Secretions Characteristics thick   PRE-TX-O2   O2 Device (Oxygen Therapy) ventilator   $ Is the patient on Low Flow Oxygen? Yes   Oxygen Concentration (%) 35   SpO2 96 %   Pulse Oximetry Type Continuous   $ Pulse Oximetry - Multiple Charge Pulse Oximetry - Multiple   Pulse 93   Resp (!) 31   ETCO2   $ ETCO2 Usage Currently wearing   ETCO2 Device Type Bedside Monitor;Ventilator;Artificial Airway   Aerosol Therapy   $ Aerosol Therapy Charges Aerosol Treatment   Daily Review of Necessity (SVN) completed   Respiratory Treatment Status (SVN) given   Treatment Route (SVN) in-line;ventilator   Patient Position (SVN) HOB elevated   Post Treatment Assessment (SVN) breath sounds unchanged   Signs of Intolerance (SVN) none   Breath Sounds Post-Respiratory Treatment   Throughout All Fields Post-Treatment All Fields   Throughout All Fields Post-Treatment no change   Post-treatment Heart Rate (beats/min) 97   Post-treatment Resp Rate (breaths/min) 33   Skin Integrity   $ Wound Care Tech Time 15 min   Area Observed Neck under tracheostomy   Skin Appearance without discoloration        Surgical Airway Portex Cuffed   No placement date or time found.   Present Prior to Hospital Arrival?: Yes  Type: Tracheostomy  Brand: Portex  Airway Device Size: 8.0  Style: Cuffed   Cuff Inflation? Inflated   Status Secured   Site Assessment Drainage   Site Care Cleansed;Dried;Dressing applied   Inner  Cannula Care Changed/new   Ties Assessment Intact;Secure   Airway Safety   Ambu bag with the patient? Yes, Adult Ambu   Is mask with the patient? Yes, Adult Mask   Suction set is at the bedside? Yes   Extra trach at bedside? No   Is obturator available? No   Respiratory Interventions   Airway/Ventilation Management airway patency maintained;pulmonary hygiene promoted   Airway/Ventilation Support pulmonary hygiene promoted   Airway Assistance   $ Trach Assist Charges Trach Assist;Tech time 15 min  (trach care done)   Vent Select   Conventional Vent Y   $ Ventilator Subsequent 1   Charged w/in last 24h YES   Preset Conventional Ventilator Settings   Vent Type [id number]   Ventilation Type VC   Humidity HME   Trigger Sensitivity Flow/I-Trigger 2.5 L/min   Patient Ventilator Parameters   Mean Airway Pressure 14.1 cmH20   Conventional Ventilator Alarms   Alarms On Y   Ve High Alarm 20 L/min   Ve Low Alarm 3 L/min   Vt High Alarm 15 mL   Vt Low Alarm 4 mL   Resp Rate High Alarm 50 br/min   Press High Alarm 50 cmH2O   Delay Alarm (Sec) 30 sec(s)   IHI Ventilator Associated Pneumonia Bundle (Required)   Oral Care Mouth swabbed;Mouth suctioned;with half strength hydrogen peroxide   Ready to Wean/Extubation Screen   FIO2<=50 (chart decimal) 0.35

## 2022-01-04 NOTE — ASSESSMENT & PLAN NOTE
"This patient does have evidence of infective focus  My overall impression is sepsis. Vital signs were reviewed and noted in progress note.  Antibiotics given-   Antibiotics (From admission, onward)            Start     Stop Route Frequency Ordered    01/04/22 0900  mupirocin 2 % ointment         01/09 0859 Nasl 2 times daily 01/04/22 0142    01/04/22 0400  piperacillin-tazobactam 4.5 g in dextrose 5 % 100 mL IVPB (ready to mix system)         -- IV Every 8 hours (non-standard times) 01/03/22 2141 01/03/22 2241  vancomycin - pharmacy to dose  (vancomycin IVPB)        "And" Linked Group Details    -- IV pharmacy to manage frequency 01/03/22 2141        Cultures were taken-   Microbiology Results (last 7 days)     Procedure Component Value Units Date/Time    Blood culture x two cultures. Draw prior to antibiotics. [[id number]] Collected: 01/03/22 1950    Order Status: Completed Specimen: Blood Updated: 01/04/22 0715     Blood Culture, Routine No Growth to date    Narrative:      Aerobic and anaerobic    Blood culture x two cultures. Draw prior to antibiotics. [[id number]] Collected: 01/03/22 1950    Order Status: Completed Specimen: Blood Updated: 01/04/22 0715     Blood Culture, Routine No Growth to date    Narrative:      Aerobic and anaerobic    Culture, Respiratory with Gram Stain [[id number]]     Order Status: No result Specimen: Respiratory     Urine culture [[id number]] Collected: 01/03/22 1943    Order Status: No result Specimen: Urine Updated: 01/03/22 2030        Latest lactate reviewed, they are-  Recent Labs   Lab 01/03/22 1950 01/03/22  2315 01/04/22  0350   LACTATE 2.6* 2.4* 1.4       Organ dysfunction indicated by tachycardia, tachypnea  Source- respiratory    Source control Achieved by- IV abx    "

## 2022-01-04 NOTE — CONSULTS
01/04/2022      Admit Date: 1/3/2022  Genna Felix  New Patient Consult    Chief Complaint   Patient presents with    Emesis     1/1 and 1/2.  Not as much today.         History of Present Illness:  Pt is a 60 yo female with anoxic brain injury, chronic respiratory failure with vent dependence, PEG, cirrhosis, GERD, HTN, seizure disorder, resident of [facility] who presented to the ED for evaluation of tachycardia. Pt is admitted to the ICU with sepsis and pneumonia. She is requiring precedex drip for agitation/tachypnea. She is receiving fluid resuscitation and vanco/zosyn for pneumonia.      PFSH:  Past Medical History:   Diagnosis Date    Anoxic brain damage 07/2020    Bedbound 07/2020    Cardiac arrest 07/2020    Cirrhosis     GERD (gastroesophageal reflux disease)     Hemangioma of intra-abdominal structure     Hypertension     Nursing home resident     Protein calorie malnutrition     Pulmonary embolus     Respiratory distress     S/P percutaneous endoscopic gastrostomy (PEG) tube placement 07/2020    Total self-care deficit 07/2020    Tracheostomy dependence     7/2020     Past Surgical History:   Procedure Laterality Date    PEG W/TRACHEOSTOMY PLACEMENT  07/27/2020    SKIN GRAFT      buttocks     Social History     Tobacco Use    Smoking status: Never Smoker    Smokeless tobacco: Never Used   Substance Use Topics    Alcohol use: Not Currently    Drug use: Not Currently     Family History   Problem Relation Age of Onset    No Known Problems Mother     No Known Problems Father      Review of patient's allergies indicates:  No Known Allergies    Performance Status:Performance Status:The patient's activity level is bedbound.    Review of Systems:  due to neurologic status/impairments a Review of Systems could not be obtained       Exam:Comprehensive exam done. /60   Pulse 101   Temp (!) 95.54 °F (35.3 °C)   Resp (!) 48   Ht 5' (1.524 m)   Wt 65.7 kg (144 lb 13.5 oz)    SpO2 96%   Breastfeeding No   BMI 28.29 kg/m²   Exam included Vitals as listed, and patient's appearance and affect and alertness and mood, oral exam for yeast and hygiene and pharynx lesions and Mallapatti (M) score, neck with inspection for jvd and masses and thyroid abnormalities and lymph nodes (supraclavicular and infraclavicular nodes also examined and noted if abn), chest exam included symmetry and effort and fremitus and percussion and auscultation, cardiac exam included rhythm and gallops and murmur and rubs and jvd and edema, abdominal exam for mass and hepatosplenomegaly and tenderness and hernias and bowel sounds, Musculoskeletal exam with muscle tone and posture and mobility/gait and  strenght, and skin for rashes and cyanosis and pallor and turgor, extremity for clubbing.  Findings were normal except as listed below:  Unresponsive, no distress  Trach w vent  Bilateral coarse breath sounds  Abdomen soft, PEG in place  Contracted upper ext      Radiographs reviewed: view by direct vision   CXR 1/3/22- bilateral lower lobe infiltrates, worsened compared to previous film      Labs     Recent Labs   Lab 01/04/22  0350   WBC 10.71   HGB 8.9*   HCT 27.6*        Recent Labs   Lab 01/04/22  0350 01/04/22  0352   *  --    K 3.4*  --      --    CO2 18*  --    BUN 17  --    CREATININE 0.5  --      --    CALCIUM 8.8  --    MG 1.6  --    PHOS 3.2  --    AST 18  --    ALT 35  --    ALKPHOS 181*  --    BILITOT 1.1*  --    PROT 6.9  --    ALBUMIN 2.7*  --    LACTATE 1.4  --    BNP  --  29     Recent Labs   Lab 01/04/22  0550   PH 7.385   PCO2 32.7*   PO2 91   HCO3 19.5*     Microbiology Results (last 7 days)     Procedure Component Value Units Date/Time    Blood culture x two cultures. Draw prior to antibiotics. [[id number]] Collected: 01/03/22 1950    Order Status: Completed Specimen: Blood Updated: 01/04/22 0715     Blood Culture, Routine No Growth to date    Narrative:      Aerobic  and anaerobic    Blood culture x two cultures. Draw prior to antibiotics. [[id number]] Collected: 01/03/22 1950    Order Status: Completed Specimen: Blood Updated: 01/04/22 0715     Blood Culture, Routine No Growth to date    Narrative:      Aerobic and anaerobic    Culture, Respiratory with Gram Stain [[id number]]     Order Status: No result Specimen: Respiratory     Urine culture [[id number]] Collected: 01/03/22 1943    Order Status: No result Specimen: Urine Updated: 01/03/22 2030          Impression:  Active Hospital Problems    Diagnosis  POA    *Sepsis [A41.9]  Yes    UTI (urinary tract infection) [N39.0]  Yes    Ventilator associated pneumonia [J95.851]  Yes    Essential hypertension [I10]  Yes    Gastroesophageal reflux disease [K21.9]  Yes    Malnutrition of moderate degree [E44.0]  Yes    Chronic respiratory failure with hypoxia [J96.11]  Yes    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  Added automatically from request for surgery [id number]        Resolved Hospital Problems   No resolved problems to display.               Plan:     - continue vent, settings appropriate, no weaning  - continue broad spectrum antibiotics  - follow up sputum culture  - tube feeds  - precedex as needed  - lovenox subq for DVT prophylaxis  - pepcid for GI prophylaxis  - wound care for decub ulcer  - continue midodrine, BP borderline. Pressor support- levophed if needed    The following were evaluated and adjusted as needed: mechanical ventilator settings and weaning status, vasopressors, intravenous fluids and nutritional status, sedation and neurologic status, antibiotics and acid base balance and oxygenation needs       Critical Care  -  THE PATIENT HAS A HIGH PROBABILITY OF IMMINENT OR LIFE THREATENING DETERIORATION.  Over 50%time of encounter was in direct care at bedside.  Time was 30 to 74 minutes required for patient care.  Time needed for all of the above totaled 35 minutes.    Dora Montesinos MD  Pulmonary & Critical Care Medicine

## 2022-01-04 NOTE — PROGRESS NOTES
Pt suddenly agitated, breathing over vent with resp rate in 40's.  This behavior started after potassium riders were initiated, even at a low infusion rate.   However, this pt exhibited these same  Behaviors in ER before admission to the ICU, which is why the precedex gtt was  Initiated.  EICU contacted for further orders and guidance.  New orders given to give potassium supplement  Via peg and discontinue Potassium rider.   Also morphine 2mg IVP ordered.

## 2022-01-04 NOTE — NURSING
Dr. Guzman at bedside to assess patient and discuss plan of care. MD made aware of fluctuating BP. States to notify her of a MAP sustained lower than 60 and that we will try to maintain BP with fluids before starting a pressor. Maintenance fluids increased to 150ml/hr. Central line order changed to PICC order and Freeman Ritter, notified of PICC order.

## 2022-01-04 NOTE — ED PROVIDER NOTES
Encounter Date: 1/3/2022       History     Chief Complaint   Patient presents with    Emesis     1/1 and 1/2.  Not as much today.       Chief complaint:  Vomiting and tachycardia    HPI:  61-year-old female vent dependent patient with a history of anoxic brain injury status post cardiac arrest presents with tachycardia today.  She had 2 episodes of emesis yesterday.  She was recently hospitalized 1 week ago for a right middle lobe pneumonia..  She has had no fever.  She is a nursing home resident.  Past medical history is significant for indwelling PEG tube, indwelling [place] and trach dependence.          Review of patient's allergies indicates:  No Known Allergies  Past Medical History:   Diagnosis Date    Anoxic brain damage 07/2020    Bedbound 07/2020    Cardiac arrest 07/2020    Cirrhosis     GERD (gastroesophageal reflux disease)     Hemangioma of intra-abdominal structure     Hypertension     Nursing home resident     Protein calorie malnutrition     Pulmonary embolus     Respiratory distress     S/P percutaneous endoscopic gastrostomy (PEG) tube placement 07/2020    Total self-care deficit 07/2020    Tracheostomy dependence     7/2020     Past Surgical History:   Procedure Laterality Date    PEG W/TRACHEOSTOMY PLACEMENT  07/27/2020    SKIN GRAFT      buttocks     Family History   Problem Relation Age of Onset    No Known Problems Mother     No Known Problems Father      Social History     Tobacco Use    Smoking status: Never Smoker    Smokeless tobacco: Never Used   Substance Use Topics    Alcohol use: Not Currently    Drug use: Not Currently     Review of Systems   Unable to perform ROS: Patient nonverbal       Physical Exam     Initial Vitals [01/03/22 1908]   BP Pulse Resp Temp SpO2   118/80 (!) 146 20 98 °F (36.7 °C) 99 %      MAP       --         Physical Exam    Nursing note and vitals reviewed.  Constitutional: She appears well-developed and well-nourished.   HENT:   Head:  Normocephalic and atraumatic.   Eyes: Conjunctivae are normal.   Neck: Neck supple.   Normal range of motion.  Cardiovascular: Regular rhythm and normal heart sounds. Exam reveals no gallop and no friction rub.    No murmur heard.  Tachycardic rate   Pulmonary/Chest: Effort normal and breath sounds normal. No respiratory distress. She has no wheezes. She has no rhonchi. She has no rales.   Abdominal: Abdomen is soft. She exhibits no distension. There is no abdominal tenderness.   Musculoskeletal:         General: Normal range of motion.      Cervical back: Normal range of motion and neck supple.     Neurological: She is alert.   Unresponsive to painful stimuli   Skin: Skin is warm and dry. No erythema.   Psychiatric: She has a normal mood and affect.         ED Course   Critical Care    Date/Time: 1/3/2022 9:00 PM  Performed by: Arjun Montesinos III, MD  Authorized by: Arjun Montesinos III, MD   Direct patient critical care time: 90 minutes  Total critical care time (exclusive of procedural time) : 90 minutes  Critical care was necessary to treat or prevent imminent or life-threatening deterioration of the following conditions: sepsis.  Critical care was time spent personally by me on the following activities: review of old charts, pulse oximetry, ordering and review of laboratory studies, obtaining history from patient or surrogate, evaluation of patient's response to treatment, development of treatment plan with patient or surrogate, discussions with primary provider, examination of patient, ordering and performing treatments and interventions, ordering and review of radiographic studies and re-evaluation of patient's condition.  Subsequent provider of critical care: I assumed direction of critical care for this patient from another provider of my specialty.        Labs Reviewed   CBC W/ AUTO DIFFERENTIAL - Abnormal; Notable for the following components:       Result Value    WBC 14.01 (*)     Hemoglobin 11.8 (*)      Hematocrit 36.5 (*)     RDW 16.8 (*)     Gran # (ANC) 12.3 (*)     Immature Grans (Abs) 0.07 (*)     Lymph # 0.8 (*)     Gran % 87.5 (*)     Lymph % 5.6 (*)     All other components within normal limits   COMPREHENSIVE METABOLIC PANEL - Abnormal; Notable for the following components:    Sodium 123 (*)     Chloride 92 (*)     CO2 18 (*)     Glucose 148 (*)     BUN 21 (*)     Albumin 3.2 (*)     Total Bilirubin 1.1 (*)     Alkaline Phosphatase 222 (*)     ALT 46 (*)     All other components within normal limits   LACTIC ACID, PLASMA - Abnormal; Notable for the following components:    Lactate (Lactic Acid) 2.6 (*)     All other components within normal limits   URINALYSIS, REFLEX TO URINE CULTURE - Abnormal; Notable for the following components:    Protein, UA Trace (*)     Occult Blood UA 1+ (*)     Leukocytes, UA 3+ (*)     All other components within normal limits    Narrative:     Specimen Source->Urine   URINALYSIS MICROSCOPIC - Abnormal; Notable for the following components:    WBC, UA 24 (*)     Bacteria Many (*)     Triplephos Ximena, UA Many (*)     All other components within normal limits    Narrative:     Specimen Source->Urine   CULTURE, BLOOD   CULTURE, BLOOD   CULTURE, URINE   SARS-COV-2 RNA AMPLIFICATION, QUAL     EKG Readings: (Independently Interpreted)   Rhythm: Sinus Tachycardia. Heart Rate: 142. Ectopy: No Ectopy. Conduction: Normal. ST Segments: Normal ST Segments. T Waves: Normal. Axis: Normal. Clinical Impression: Sinus Tachycardia       Imaging Results          X-Ray Chest AP Portable (Final result)  Result time 01/03/22 19:38:01    Final result by Franklyn Leggett DO (01/03/22 19:38:01)                 Impression:      Worsening airspace opacities in bilateral lungs, suspicious for multifocal pneumonia.      Electronically signed by: Franklyn Leggett  Date:    01/03/2022  Time:    19:38             Narrative:    EXAMINATION:  XR CHEST AP PORTABLE    CLINICAL  HISTORY:  Sepsis;    TECHNIQUE:  Single frontal view of the chest was performed.    COMPARISON:  12/27/2021.    FINDINGS:  There is a tracheostomy tube terminating between the thoracic inlet the laith, unchanged in position.    There are multiple overlying lines and tubes.    There are worsening patchy airspace opacities within the bilateral lungs.  The pleural spaces are clear.    The cardiac silhouette is unremarkable.  There are atherosclerotic calcifications of the aortic arch.    The visualized osseous structures demonstrate degenerative changes and osteopenia.  There is continued chronic dislocation of the right glenohumeral joint.                                 Medications   vancomycin in dextrose 5 % 1 gram/250 mL IVPB 1,000 mg (1,000 mg Intravenous New Bag 1/3/22 2010)   0.9%  NaCl infusion ( Intravenous New Bag 1/3/22 1940)   piperacillin-tazobactam 3.375 g in dextrose 5 % 50 mL IVPB (ready to mix system) (0 g Intravenous Stopped 1/3/22 2029)     Medical Decision Making:   ED Management:  61-year-old vent dependent patient presents with nausea vomiting and tachycardia.  She is found have bilateral multifocal pneumonia which most likely secondary to aspiration.  She will be admitted for IV antibiotics.  Other:   I have discussed this case with another health care provider.                      Clinical Impression:   Final diagnoses:  [R00.0] Tachycardia                 Arjun Montesinos III, MD  01/03/22 2103       Arjun Montesinos III, MD  01/03/22 2122

## 2022-01-04 NOTE — ASSESSMENT & PLAN NOTE
Patient with Hypoxic Respiratory failure which is Chronic.  she is vent dependent. Supplemental oxygen was provided and noted- Vent Mode: A/CMV-VC  Oxygen Concentration (%):  [35-36] 35  Resp Rate Total:  [24 br/min-44 br/min] 31 br/min  Vt Set:  [350 mL-450 mL] 350 mL  PEEP/CPAP:  [5 cmH20-6.5 cmH20] 5.9 cmH20  Mean Airway Pressure:  [10.2 kpK02-29.1 cmH20] 10.2 cmH20.   Signs/symptoms of respiratory failure include- tachypnea and increased work of breathing. Contributing diagnoses includes - Aspiration and Pneumonia Labs and images were reviewed. Patient Has not had a recent ABG. Will treat underlying causes.

## 2022-01-04 NOTE — H&P
[facility] Medicine  History & Physical    Patient Name: Genna Felix  MRN: [id number]  Patient Class: IP- Inpatient  Admission Date: 1/3/2022  Attending Physician: Caron Guzman MD  Primary Care Provider: Primary Doctor No         Patient information was obtained from past medical records and ER records.     Subjective:     Principal Problem:Sepsis    Chief Complaint:   Chief Complaint   Patient presents with    Emesis     1/1 and 1/2.  Not as much today.          HPI: Genna Felix is a 61 year old female with a past medical history of anoxic brain injury s/p G tube and tracheostomy, PE, HTN, GERD who presented from [facility] for further evaluation of tachycardia.  She was discharged from this facility on 12/27/21 with a diagnosis of pneumonia with rx for bactrim.  In the ED she is found to be tachycardic with WBC 14K, lactic acid 2.6 and evidence of UTI.  CXR reveals worsening multifocal pneumonia.  Pt is nonverbal and cannot contribute to history of present illness, therefore further information is limited.  While in the ED, IV zosyn was initiated and she was give an IVF bolus.  She will be admitted to the service of hospital medicine for continued monitoring and treatment.      Past Medical History:   Diagnosis Date    Anoxic brain damage 07/2020    Bedbound 07/2020    Cardiac arrest 07/2020    Cirrhosis     GERD (gastroesophageal reflux disease)     Hemangioma of intra-abdominal structure     Hypertension     Nursing home resident     Protein calorie malnutrition     Pulmonary embolus     Respiratory distress     S/P percutaneous endoscopic gastrostomy (PEG) tube placement 07/2020    Total self-care deficit 07/2020    Tracheostomy dependence     7/2020       Past Surgical History:   Procedure Laterality Date    PEG W/TRACHEOSTOMY PLACEMENT  07/27/2020    SKIN GRAFT      buttocks       Review of patient's allergies indicates:  No Known Allergies    No current  facility-administered medications on file prior to encounter.     Current Outpatient Medications on File Prior to Encounter   Medication Sig    acetaminophen (TYLENOL) 325 MG tablet 2 tablets (650 mg total) by Per G Tube route every 4 (four) hours as needed for Pain or Temperature greater than (100.4F).    acetylcysteine 100 mg/ml, 10%, (MUCOMYST) 100 mg/mL (10 %) nebulizer solution Take 4 mLs by nebulization every 8 (eight) hours.    albuterol-ipratropium (DUO-NEB) 2.5 mg-0.5 mg/3 mL nebulizer solution Take 3 mLs by nebulization every 6 (six) hours. Rescue    clonazePAM (KLONOPIN) 0.5 MG tablet 1 tablet (0.5 mg total) by Per G Tube route 3 (three) times daily.    ergocalciferol (ERGOCALCIFEROL) 50,000 unit Cap Take 50,000 Units by mouth every 7 days.    ferrous sulfate 300 mg (60 mg iron)/5 mL syrup Take 300 mg by mouth As instructed. Every other day    midodrine (PROAMATINE) 10 MG tablet 1 tablet (10 mg total) by Per G Tube route 3 (three) times daily.    polyethylene glycol (GLYCOLAX) 17 gram PwPk Take by mouth daily as needed.    sulfamethoxazole-trimethoprim 800-160mg (BACTRIM DS) 800-160 mg Tab 1 tablet by Per G Tube route 2 (two) times daily.     Family History     Problem Relation (Age of Onset)    No Known Problems Mother, Father        Tobacco Use    Smoking status: Never Smoker    Smokeless tobacco: Never Used   Substance and Sexual Activity    Alcohol use: Not Currently    Drug use: Not Currently    Sexual activity: Not Currently     Review of Systems   Unable to perform ROS: Patient nonverbal     Objective:     Vital Signs (Most Recent):  Temp: 98 °F (36.7 °C) (01/03/22 1908)  Pulse: 108 (01/03/22 2203)  Resp: 20 (01/03/22 1908)  BP: 98/66 (01/03/22 2202)  SpO2: 95 % (01/03/22 2203) Vital Signs (24h Range):  Temp:  [98 °F (36.7 °C)] 98 °F (36.7 °C)  Pulse:  [108-148] 108  Resp:  [20] 20  SpO2:  [92 %-99 %] 95 %  BP: ()/(60-80) 98/66     Weight: 63.5 kg (140 lb)  Body mass index is  27.34 kg/m².    Physical Exam  Vitals and nursing note reviewed.   Constitutional:       General: She is not in acute distress.     Appearance: She is well-developed and well-nourished. She is ill-appearing.      Comments: Chronic trach/vent dependent   HENT:      Head: Normocephalic and atraumatic.      Mouth/Throat:      Mouth: Mucous membranes are dry.   Cardiovascular:      Rate and Rhythm: Regular rhythm. Tachycardia present.   Pulmonary:      Effort: Pulmonary effort is normal. No respiratory distress.      Breath sounds: Rhonchi present.   Abdominal:      General: Bowel sounds are normal.      Palpations: Abdomen is soft.      Tenderness: There is no abdominal tenderness.      Comments: g tube in place   Musculoskeletal:      Cervical back: Normal range of motion and neck supple.      Comments: Contractures to hands   Skin:     General: Skin is warm and dry.   Neurological:      GCS: GCS eye subscore is 1. GCS verbal subscore is 1. GCS motor subscore is 1.   Psychiatric:      Comments: Unable to assess             Significant Labs:   All pertinent labs within the past 24 hours have been reviewed.  Bilirubin:   Recent Labs   Lab 12/27/21  0038 01/03/22 1918   BILITOT 1.0 1.1*     CBC:   Recent Labs   Lab 01/03/22 1918   WBC 14.01*   HGB 11.8*   HCT 36.5*        CMP:   Recent Labs   Lab 01/03/22 1918   *   K 3.6   CL 92*   CO2 18*   *   BUN 21*   CREATININE 0.6   CALCIUM 9.5   PROT 8.4   ALBUMIN 3.2*   BILITOT 1.1*   ALKPHOS 222*   AST 25   ALT 46*   ANIONGAP 13   EGFRNONAA >60     Lactic Acid:   Recent Labs   Lab 01/03/22  1950   LACTATE 2.6*     Urine Studies:   Recent Labs   Lab 01/03/22 1943   COLORU Yellow   APPEARANCEUA Clear   PHUR 8.0   SPECGRAV 1.015   PROTEINUA Trace*   GLUCUA Negative   KETONESU Negative   BILIRUBINUA Negative   OCCULTUA 1+*   NITRITE Negative   UROBILINOGEN Negative   LEUKOCYTESUR 3+*   RBCUA 1   WBCUA 24*   BACTERIA Many*   SQUAMEPITHEL 2       Significant  "Imaging:   CXR:Worsening airspace opacities in bilateral lungs, suspicious for multifocal pneumonia      Assessment/Plan:     * Sepsis  This patient does have evidence of infective focus  My overall impression is sepsis. Vital signs were reviewed and noted in progress note.  Antibiotics given-   Antibiotics (From admission, onward)            Start     Stop Route Frequency Ordered    01/04/22 0400  piperacillin-tazobactam 4.5 g in dextrose 5 % 100 mL IVPB (ready to mix system)         -- IV Every 8 hours (non-standard times) 01/03/22 2141 01/03/22 2241  vancomycin - pharmacy to dose  (vancomycin IVPB)        "And" Linked Group Details    -- IV pharmacy to manage frequency 01/03/22 2141        Cultures were taken-   Microbiology Results (last 7 days)     Procedure Component Value Units Date/Time    Blood culture x two cultures. Draw prior to antibiotics. [[id number]] Collected: 01/03/22 1950    Order Status: Sent Specimen: Blood Updated: 01/04/22 0034    Blood culture x two cultures. Draw prior to antibiotics. [[id number]] Collected: 01/03/22 1950    Order Status: Sent Specimen: Blood Updated: 01/04/22 0034    Urine culture [[id number]] Collected: 01/03/22 1943    Order Status: No result Specimen: Urine Updated: 01/03/22 2030        Latest lactate reviewed, they are-  Recent Labs   Lab 01/03/22 1950 01/03/22  2315   LACTATE 2.6* 2.4*       Organ dysfunction indicated by tachycardia, tachypnea  Source- respiratory    Source control Achieved by- IV abx      Ventilator associated pneumonia  Failed OP abx  Pt vent dependent-consult pulmonology for vent management  IV zosyn/IV vanc  Blood and sputum cx  Nebulizer treatments        UTI (urinary tract infection)  IV zosyn; follow culture        Pressure injury of left buttock, stage 4  Consult wound care  Turn q2h        Gastroesophageal reflux disease  Chronic; utilize pepcid acutely.      Essential hypertension  Chronic, stable.  Latest blood pressure and vitals " reviewed-   Temp:  [98 °F (36.7 °C)]   Pulse:  []   Resp:  [20]   BP: ()/(60-80)   SpO2:  [92 %-99 %] .   Home meds for hypertension were reviewed and noted below.   No chronic antihypertensives noted. Review of home meds reveals midodrine which has been reordered.      Will utilize p.r.n. blood pressure medication only if patient's blood pressure greater than  180/110 and she develops symptoms such as worsening chest pain or shortness of breath.        Malnutrition of moderate degree  Nutrition consult.      Chronic respiratory failure with hypoxia  Patient with Hypoxic Respiratory failure which is Chronic.  she is vent dependent. Supplemental oxygen was provided and noted- Vent Mode: A/CMV-VC  Oxygen Concentration (%):  [36] 36  Resp Rate Total:  [30 br/min] 30 br/min  Vt Set:  [450 mL] 450 mL  PEEP/CPAP:  [5 cmH20] 5 cmH20.   Signs/symptoms of respiratory failure include- tachypnea and increased work of breathing. Contributing diagnoses includes - Aspiration and Pneumonia Labs and images were reviewed. Patient Has not had a recent ABG. Will treat underlying causes.    PEG (percutaneous endoscopic gastrostomy) status  Noted; care per nursing.  Nutrition consult for TF.        Tracheostomy dependence  Noted; care per nursing      Anoxic brain injury  Noted.  Turn q2h      Anemia of chronic disease  Patient's anemia is currently controlled. S/p 0 units of PRBCs.  Current CBC reviewed-   Lab Results   Component Value Date    HGB 11.8 (L) 01/03/2022    HCT 36.5 (L) 01/03/2022    MCV 87 01/03/2022     01/03/2022     Monitor serial CBC and transfuse if patient becomes hemodynamically unstable, symptomatic or H/H drops below 7/21.           VTE Risk Mitigation (From admission, onward)         Ordered     enoxaparin injection 40 mg  Daily         01/04/22 0034     IP VTE HIGH RISK PATIENT  Once         01/04/22 0034     Place sequential compression device  Until discontinued         01/04/22 0034                    April Ross, SHELIAP  Department of Hospital Medicine   [facility] - Emergency Dept

## 2022-01-04 NOTE — PROGRESS NOTES
[facility] Hospital Medicine  Progress Note    Patient Name: Genna Felix  MRN: [id number]  Patient Class: IP- Inpatient   Admission Date: 1/3/2022  Length of Stay: 1 days  Attending Physician: Caron Guzman MD  Primary Care Provider: Primary Doctor No        Subjective:     Principal Problem:Sepsis        HPI:  Genna Felix is a 61 year old female with a past medical history of anoxic brain injury s/p G tube and tracheostomy, PE, HTN, GERD who presented from [place] for further evaluation of tachycardia.  She was discharged from this facility on 12/27/21 with a diagnosis of pneumonia with rx for bactrim.  In the ED she is found to be tachycardic with WBC 14K, lactic acid 2.6 and evidence of UTI.  CXR reveals worsening multifocal pneumonia.  Pt is nonverbal and cannot contribute to history of present illness, therefore further information is limited.  While in the ED, IV zosyn was initiated and she was give an IVF bolus.  She will be admitted to the service of hospital medicine for continued monitoring and treatment.      Overview/Hospital Course:  No notes on file    Interval History: Seen by pulmonology. Sputum culture pending. Has not required pressor support yet, will need central line if does 2/2 too contracted for PICC.     Review of Systems   Unable to perform ROS: Patient nonverbal     Objective:     Vital Signs (Most Recent):  Temp: (!) 100.4 °F (38 °C) (01/04/22 1200)  Pulse: (!) 111 (01/04/22 1300)  Resp: (!) 32 (01/04/22 1300)  BP: (!) 89/50 (01/04/22 1300)  SpO2: 96 % (01/04/22 1300) Vital Signs (24h Range):  Temp:  [93.56 °F (34.2 °C)-100.4 °F (38 °C)] 100.4 °F (38 °C)  Pulse:  [] 111  Resp:  [20-72] 32  SpO2:  [92 %-100 %] 96 %  BP: ()/(42-80) 89/50     Weight: 65.7 kg (144 lb 13.5 oz)  Body mass index is 28.29 kg/m².    Intake/Output Summary (Last 24 hours) at 1/4/2022 1330  Last data filed at 1/4/2022 1105  Gross per 24 hour   Intake 2937.85 ml   Output 200 ml  "  Net 2737.85 ml      Physical Exam  Vitals and nursing note reviewed.   Constitutional:       General: She is not in acute distress.     Appearance: She is well-developed. She is ill-appearing.      Comments: Chronic trach/vent dependent   HENT:      Head: Normocephalic and atraumatic.      Mouth/Throat:      Mouth: Mucous membranes are dry.   Cardiovascular:      Rate and Rhythm: Regular rhythm. Tachycardia present.   Pulmonary:      Effort: Pulmonary effort is normal. No respiratory distress.      Breath sounds: Rhonchi present.   Abdominal:      General: Bowel sounds are normal.      Palpations: Abdomen is soft.      Tenderness: There is no abdominal tenderness.      Comments: g tube in place   Musculoskeletal:      Cervical back: Normal range of motion and neck supple.      Comments: Contractures to hands   Skin:     General: Skin is warm and dry.   Neurological:      GCS: GCS eye subscore is 1. GCS verbal subscore is 1. GCS motor subscore is 1.   Psychiatric:      Comments: Unable to assess         Significant Labs: All pertinent labs within the past 24 hours have been reviewed.    Significant Imaging: I have reviewed all pertinent imaging results/findings within the past 24 hours.      Assessment/Plan:      * Sepsis  This patient does have evidence of infective focus  My overall impression is sepsis. Vital signs were reviewed and noted in progress note.  Antibiotics given-   Antibiotics (From admission, onward)            Start     Stop Route Frequency Ordered    01/04/22 0900  mupirocin 2 % ointment         01/09 0859 Nasl 2 times daily 01/04/22 0142    01/04/22 0400  piperacillin-tazobactam 4.5 g in dextrose 5 % 100 mL IVPB (ready to mix system)         -- IV Every 8 hours (non-standard times) 01/03/22 2141    01/03/22 2241  vancomycin - pharmacy to dose  (vancomycin IVPB)        "And" Linked Group Details    -- IV pharmacy to manage frequency 01/03/22 2141        Cultures were taken-   Microbiology Results " (last 7 days)     Procedure Component Value Units Date/Time    Blood culture x two cultures. Draw prior to antibiotics. [[id number]] Collected: 01/03/22 1950    Order Status: Completed Specimen: Blood Updated: 01/04/22 0715     Blood Culture, Routine No Growth to date    Narrative:      Aerobic and anaerobic    Blood culture x two cultures. Draw prior to antibiotics. [[id number]] Collected: 01/03/22 1950    Order Status: Completed Specimen: Blood Updated: 01/04/22 0715     Blood Culture, Routine No Growth to date    Narrative:      Aerobic and anaerobic    Culture, Respiratory with Gram Stain [[id number]]     Order Status: No result Specimen: Respiratory     Urine culture [[id number]] Collected: 01/03/22 1943    Order Status: No result Specimen: Urine Updated: 01/03/22 2030        Latest lactate reviewed, they are-  Recent Labs   Lab 01/03/22 1950 01/03/22 2315 01/04/22  0350   LACTATE 2.6* 2.4* 1.4       Organ dysfunction indicated by tachycardia, tachypnea  Source- respiratory    Source control Achieved by- IV abx      Hyponatremia        UTI (urinary tract infection)  IV zosyn; follow culture        Pressure injury of left buttock, stage 4  Consult wound care  Turn q2h        Ventilator associated pneumonia  Failed OP abx  Pt vent dependent-consult pulmonology for vent management  IV zosyn/IV vanc  Blood and sputum cx  Nebulizer treatments        Gastroesophageal reflux disease  Chronic; utilize pepcid acutely.      Essential hypertension  Chronic, stable.  Latest blood pressure and vitals reviewed-   Temp:  [93.56 °F (34.2 °C)-100.4 °F (38 °C)]   Pulse:  []   Resp:  [20-72]   BP: ()/(42-80)   SpO2:  [92 %-100 %] .   Home meds for hypertension were reviewed and noted below.   No chronic antihypertensives noted. Review of home meds reveals midodrine which has been reordered.      Will utilize p.r.n. blood pressure medication only if patient's blood pressure greater than  180/110 and she develops symptoms  such as worsening chest pain or shortness of breath.        Malnutrition of moderate degree  Nutrition consult.      Chronic respiratory failure with hypoxia  Patient with Hypoxic Respiratory failure which is Chronic.  she is vent dependent. Supplemental oxygen was provided and noted- Vent Mode: A/CMV-VC  Oxygen Concentration (%):  [35-36] 35  Resp Rate Total:  [24 br/min-44 br/min] 31 br/min  Vt Set:  [350 mL-450 mL] 350 mL  PEEP/CPAP:  [5 cmH20-6.5 cmH20] 5.9 cmH20  Mean Airway Pressure:  [10.2 dqY11-95.1 cmH20] 10.2 cmH20.   Signs/symptoms of respiratory failure include- tachypnea and increased work of breathing. Contributing diagnoses includes - Aspiration and Pneumonia Labs and images were reviewed. Patient Has not had a recent ABG. Will treat underlying causes.    PEG (percutaneous endoscopic gastrostomy) status  Noted; care per nursing.  TF per nutrition recs        Tracheostomy dependence  Noted; care per nursing      Anoxic brain injury  Noted.  Turn q2h      Anemia of chronic disease  Patient's anemia is currently controlled. S/p 0 units of PRBCs.  Current CBC reviewed-   Lab Results   Component Value Date    HGB 8.9 (L) 01/04/2022    HCT 27.6 (L) 01/04/2022    MCV 87 01/04/2022     01/04/2022     Monitor serial CBC and transfuse if patient becomes hemodynamically unstable, symptomatic or H/H drops below 7/21.           VTE Risk Mitigation (From admission, onward)         Ordered     enoxaparin injection 40 mg  Daily         01/04/22 0034     IP VTE HIGH RISK PATIENT  Once         01/04/22 0034     Place sequential compression device  Until discontinued         01/04/22 0034                Discharge Planning   [place]:      Code Status: Full Code   Is the patient medically ready for discharge?:     Reason for patient still in hospital (select all that apply): Patient trending condition and Treatment               Critical care time spent on the evaluation and treatment of severe organ dysfunction,  review of pertinent labs and imaging studies, discussions with consulting providers and discussions with patient/family: 35 minutes.      Caron Guzman MD  Department of Hospital Medicine   [facility]

## 2022-01-04 NOTE — ASSESSMENT & PLAN NOTE
Chronic, stable.  Latest blood pressure and vitals reviewed-   Temp:  [98 °F (36.7 °C)]   Pulse:  []   Resp:  [20]   BP: ()/(60-80)   SpO2:  [92 %-99 %] .   Home meds for hypertension were reviewed and noted below.   No chronic antihypertensives noted. Review of home meds reveals midodrine which has been reordered.      Will utilize p.r.n. blood pressure medication only if patient's blood pressure greater than  180/110 and she develops symptoms such as worsening chest pain or shortness of breath.

## 2022-01-04 NOTE — ASSESSMENT & PLAN NOTE
Patient with Hypoxic Respiratory failure which is Chronic.  she is vent dependent. Supplemental oxygen was provided and noted- Vent Mode: A/CMV-VC  Oxygen Concentration (%):  [36] 36  Resp Rate Total:  [30 br/min] 30 br/min  Vt Set:  [450 mL] 450 mL  PEEP/CPAP:  [5 cmH20] 5 cmH20.   Signs/symptoms of respiratory failure include- tachypnea and increased work of breathing. Contributing diagnoses includes - Aspiration and Pneumonia Labs and images were reviewed. Patient Has not had a recent ABG. Will treat underlying causes.

## 2022-01-05 LAB
ALBUMIN SERPL BCP-MCNC: 2.4 G/DL (ref 3.5–5.2)
ALP SERPL-CCNC: 141 U/L (ref 55–135)
ALT SERPL W/O P-5'-P-CCNC: 27 U/L (ref 10–44)
ANION GAP SERPL CALC-SCNC: 11 MMOL/L (ref 8–16)
AST SERPL-CCNC: 14 U/L (ref 10–40)
BASOPHILS # BLD AUTO: 0.01 K/UL (ref 0–0.2)
BASOPHILS NFR BLD: 0.2 % (ref 0–1.9)
BILIRUB SERPL-MCNC: 0.8 MG/DL (ref 0.1–1)
BUN SERPL-MCNC: 9 MG/DL (ref 6–20)
CALCIUM SERPL-MCNC: 8.4 MG/DL (ref 8.7–10.5)
CHLORIDE SERPL-SCNC: 109 MMOL/L (ref 95–110)
CO2 SERPL-SCNC: 17 MMOL/L (ref 23–29)
CREAT SERPL-MCNC: 0.5 MG/DL (ref 0.5–1.4)
DIFFERENTIAL METHOD: ABNORMAL
EOSINOPHIL # BLD AUTO: 0.1 K/UL (ref 0–0.5)
EOSINOPHIL NFR BLD: 2.8 % (ref 0–8)
ERYTHROCYTE [DISTWIDTH] IN BLOOD BY AUTOMATED COUNT: 17.2 % (ref 11.5–14.5)
EST. GFR  (AFRICAN AMERICAN): >60 ML/MIN/1.73 M^2
EST. GFR  (NON AFRICAN AMERICAN): >60 ML/MIN/1.73 M^2
GLUCOSE SERPL-MCNC: 80 MG/DL (ref 70–110)
HCT VFR BLD AUTO: 23.6 % (ref 37–48.5)
HGB BLD-MCNC: 7.2 G/DL (ref 12–16)
IMM GRANULOCYTES # BLD AUTO: 0.01 K/UL (ref 0–0.04)
IMM GRANULOCYTES NFR BLD AUTO: 0.2 % (ref 0–0.5)
LYMPHOCYTES # BLD AUTO: 1 K/UL (ref 1–4.8)
LYMPHOCYTES NFR BLD: 21.1 % (ref 18–48)
MAGNESIUM SERPL-MCNC: 1.7 MG/DL (ref 1.6–2.6)
MAGNESIUM SERPL-MCNC: 2 MG/DL (ref 1.6–2.6)
MCH RBC QN AUTO: 27.8 PG (ref 27–31)
MCHC RBC AUTO-ENTMCNC: 30.5 G/DL (ref 32–36)
MCV RBC AUTO: 91 FL (ref 82–98)
MONOCYTES # BLD AUTO: 0.6 K/UL (ref 0.3–1)
MONOCYTES NFR BLD: 13.2 % (ref 4–15)
NEUTROPHILS # BLD AUTO: 2.9 K/UL (ref 1.8–7.7)
NEUTROPHILS NFR BLD: 62.5 % (ref 38–73)
NRBC BLD-RTO: 0 /100 WBC
PHOSPHATE SERPL-MCNC: 2.9 MG/DL (ref 2.7–4.5)
PLATELET # BLD AUTO: 274 K/UL (ref 150–450)
PMV BLD AUTO: 10.3 FL (ref 9.2–12.9)
POCT GLUCOSE: 117 MG/DL (ref 70–110)
POCT GLUCOSE: 119 MG/DL (ref 70–110)
POCT GLUCOSE: 143 MG/DL (ref 70–110)
POTASSIUM SERPL-SCNC: 3.6 MMOL/L (ref 3.5–5.1)
POTASSIUM SERPL-SCNC: 4.6 MMOL/L (ref 3.5–5.1)
PROT SERPL-MCNC: 6.1 G/DL (ref 6–8.4)
RBC # BLD AUTO: 2.59 M/UL (ref 4–5.4)
SODIUM SERPL-SCNC: 137 MMOL/L (ref 136–145)
VANCOMYCIN SERPL-MCNC: 8.6 UG/ML
WBC # BLD AUTO: 4.7 K/UL (ref 3.9–12.7)

## 2022-01-05 PROCEDURE — 84132 ASSAY OF SERUM POTASSIUM: CPT | Performed by: HOSPITALIST

## 2022-01-05 PROCEDURE — 27200966 HC CLOSED SUCTION SYSTEM

## 2022-01-05 PROCEDURE — 80053 COMPREHEN METABOLIC PANEL: CPT | Performed by: NURSE PRACTITIONER

## 2022-01-05 PROCEDURE — 27000221 HC OXYGEN, UP TO 24 HOURS

## 2022-01-05 PROCEDURE — 85025 COMPLETE CBC W/AUTO DIFF WBC: CPT | Performed by: NURSE PRACTITIONER

## 2022-01-05 PROCEDURE — 87070 CULTURE OTHR SPECIMN AEROBIC: CPT | Performed by: NURSE PRACTITIONER

## 2022-01-05 PROCEDURE — 99900035 HC TECH TIME PER 15 MIN (STAT)

## 2022-01-05 PROCEDURE — 83735 ASSAY OF MAGNESIUM: CPT | Mod: 91 | Performed by: HOSPITALIST

## 2022-01-05 PROCEDURE — 87186 SC STD MICRODIL/AGAR DIL: CPT | Mod: 59 | Performed by: NURSE PRACTITIONER

## 2022-01-05 PROCEDURE — 25000242 PHARM REV CODE 250 ALT 637 W/ HCPCS: Performed by: NURSE PRACTITIONER

## 2022-01-05 PROCEDURE — 36415 COLL VENOUS BLD VENIPUNCTURE: CPT | Performed by: NURSE PRACTITIONER

## 2022-01-05 PROCEDURE — 99291 PR CRITICAL CARE, E/M 30-74 MINUTES: ICD-10-PCS | Mod: ,,, | Performed by: INTERNAL MEDICINE

## 2022-01-05 PROCEDURE — 87077 CULTURE AEROBIC IDENTIFY: CPT | Performed by: NURSE PRACTITIONER

## 2022-01-05 PROCEDURE — 63600175 PHARM REV CODE 636 W HCPCS: Performed by: HOSPITALIST

## 2022-01-05 PROCEDURE — 25000003 PHARM REV CODE 250: Performed by: NURSE PRACTITIONER

## 2022-01-05 PROCEDURE — 36415 COLL VENOUS BLD VENIPUNCTURE: CPT | Performed by: HOSPITALIST

## 2022-01-05 PROCEDURE — 25000003 PHARM REV CODE 250: Performed by: HOSPITALIST

## 2022-01-05 PROCEDURE — 80202 ASSAY OF VANCOMYCIN: CPT | Performed by: HOSPITALIST

## 2022-01-05 PROCEDURE — 94640 AIRWAY INHALATION TREATMENT: CPT

## 2022-01-05 PROCEDURE — 99900026 HC AIRWAY MAINTENANCE (STAT)

## 2022-01-05 PROCEDURE — 20000000 HC ICU ROOM

## 2022-01-05 PROCEDURE — 99291 CRITICAL CARE FIRST HOUR: CPT | Mod: ,,, | Performed by: INTERNAL MEDICINE

## 2022-01-05 PROCEDURE — 83735 ASSAY OF MAGNESIUM: CPT | Performed by: NURSE PRACTITIONER

## 2022-01-05 PROCEDURE — 84100 ASSAY OF PHOSPHORUS: CPT | Performed by: NURSE PRACTITIONER

## 2022-01-05 PROCEDURE — 94761 N-INVAS EAR/PLS OXIMETRY MLT: CPT

## 2022-01-05 PROCEDURE — 94003 VENT MGMT INPAT SUBQ DAY: CPT

## 2022-01-05 PROCEDURE — 27000207 HC ISOLATION

## 2022-01-05 PROCEDURE — 63600175 PHARM REV CODE 636 W HCPCS: Performed by: NURSE PRACTITIONER

## 2022-01-05 PROCEDURE — 25000003 PHARM REV CODE 250: Performed by: SURGERY

## 2022-01-05 PROCEDURE — 87205 SMEAR GRAM STAIN: CPT | Performed by: NURSE PRACTITIONER

## 2022-01-05 RX ORDER — SODIUM CHLORIDE 9 MG/ML
INJECTION, SOLUTION INTRAVENOUS CONTINUOUS
Status: DISCONTINUED | OUTPATIENT
Start: 2022-01-05 | End: 2022-01-10

## 2022-01-05 RX ORDER — POTASSIUM CHLORIDE 7.45 MG/ML
80 INJECTION INTRAVENOUS
Status: DISCONTINUED | OUTPATIENT
Start: 2022-01-05 | End: 2022-01-21 | Stop reason: HOSPADM

## 2022-01-05 RX ORDER — POTASSIUM CHLORIDE 7.45 MG/ML
40 INJECTION INTRAVENOUS
Status: DISCONTINUED | OUTPATIENT
Start: 2022-01-05 | End: 2022-01-21 | Stop reason: HOSPADM

## 2022-01-05 RX ORDER — SODIUM CHLORIDE 9 MG/ML
INJECTION, SOLUTION INTRAVENOUS CONTINUOUS
Status: DISCONTINUED | OUTPATIENT
Start: 2022-01-05 | End: 2022-01-05

## 2022-01-05 RX ORDER — MAGNESIUM SULFATE HEPTAHYDRATE 40 MG/ML
4 INJECTION, SOLUTION INTRAVENOUS
Status: DISCONTINUED | OUTPATIENT
Start: 2022-01-05 | End: 2022-01-21 | Stop reason: HOSPADM

## 2022-01-05 RX ORDER — MAGNESIUM SULFATE HEPTAHYDRATE 40 MG/ML
2 INJECTION, SOLUTION INTRAVENOUS
Status: DISCONTINUED | OUTPATIENT
Start: 2022-01-05 | End: 2022-01-21 | Stop reason: HOSPADM

## 2022-01-05 RX ORDER — POTASSIUM CHLORIDE 7.45 MG/ML
60 INJECTION INTRAVENOUS
Status: DISCONTINUED | OUTPATIENT
Start: 2022-01-05 | End: 2022-01-21 | Stop reason: HOSPADM

## 2022-01-05 RX ADMIN — SODIUM CHLORIDE: 0.9 INJECTION, SOLUTION INTRAVENOUS at 08:01

## 2022-01-05 RX ADMIN — HYPROMELLOSE 2910 1 DROP: 5 SOLUTION OPHTHALMIC at 03:01

## 2022-01-05 RX ADMIN — MUPIROCIN: 20 OINTMENT TOPICAL at 08:01

## 2022-01-05 RX ADMIN — CHLORHEXIDINE GLUCONATE 0.12% ORAL RINSE 15 ML: 1.2 LIQUID ORAL at 08:01

## 2022-01-05 RX ADMIN — PIPERACILLIN AND TAZOBACTAM 4.5 G: 4; .5 INJECTION, POWDER, LYOPHILIZED, FOR SOLUTION INTRAVENOUS; PARENTERAL at 01:01

## 2022-01-05 RX ADMIN — SODIUM CHLORIDE: 0.9 INJECTION, SOLUTION INTRAVENOUS at 01:01

## 2022-01-05 RX ADMIN — MIDODRINE HYDROCHLORIDE 10 MG: 5 TABLET ORAL at 08:01

## 2022-01-05 RX ADMIN — IPRATROPIUM BROMIDE AND ALBUTEROL SULFATE 3 ML: 2.5; .5 SOLUTION RESPIRATORY (INHALATION) at 12:01

## 2022-01-05 RX ADMIN — SODIUM CHLORIDE: 0.9 INJECTION, SOLUTION INTRAVENOUS at 12:01

## 2022-01-05 RX ADMIN — IPRATROPIUM BROMIDE AND ALBUTEROL SULFATE 3 ML: 2.5; .5 SOLUTION RESPIRATORY (INHALATION) at 04:01

## 2022-01-05 RX ADMIN — IPRATROPIUM BROMIDE AND ALBUTEROL SULFATE 3 ML: 2.5; .5 SOLUTION RESPIRATORY (INHALATION) at 07:01

## 2022-01-05 RX ADMIN — ENOXAPARIN SODIUM 40 MG: 40 INJECTION SUBCUTANEOUS at 05:01

## 2022-01-05 RX ADMIN — SODIUM CHLORIDE: 0.9 INJECTION, SOLUTION INTRAVENOUS at 07:01

## 2022-01-05 RX ADMIN — PIPERACILLIN AND TAZOBACTAM 4.5 G: 4; .5 INJECTION, POWDER, LYOPHILIZED, FOR SOLUTION INTRAVENOUS; PARENTERAL at 08:01

## 2022-01-05 RX ADMIN — HYPROMELLOSE 2910 1 DROP: 5 SOLUTION OPHTHALMIC at 05:01

## 2022-01-05 RX ADMIN — POTASSIUM BICARBONATE 50 MEQ: 977.5 TABLET, EFFERVESCENT ORAL at 08:01

## 2022-01-05 RX ADMIN — FAMOTIDINE 20 MG: 10 INJECTION INTRAVENOUS at 08:01

## 2022-01-05 RX ADMIN — VANCOMYCIN HYDROCHLORIDE 1250 MG: 1.25 INJECTION, POWDER, LYOPHILIZED, FOR SOLUTION INTRAVENOUS at 11:01

## 2022-01-05 RX ADMIN — PIPERACILLIN AND TAZOBACTAM 4.5 G: 4; .5 INJECTION, POWDER, LYOPHILIZED, FOR SOLUTION INTRAVENOUS; PARENTERAL at 04:01

## 2022-01-05 RX ADMIN — MAGNESIUM SULFATE 2 G: 2 INJECTION INTRAVENOUS at 08:01

## 2022-01-05 RX ADMIN — MIDODRINE HYDROCHLORIDE 10 MG: 5 TABLET ORAL at 03:01

## 2022-01-05 NOTE — ASSESSMENT & PLAN NOTE
"This patient does have evidence of infective focus  My overall impression is sepsis. Vital signs were reviewed and noted in progress note.  Antibiotics given-   Antibiotics (From admission, onward)            Start     Stop Route Frequency Ordered    01/04/22 0900  mupirocin 2 % ointment         01/09 0859 Nasl 2 times daily 01/04/22 0142    01/04/22 0400  piperacillin-tazobactam 4.5 g in dextrose 5 % 100 mL IVPB (ready to mix system)         -- IV Every 8 hours (non-standard times) 01/03/22 2141 01/03/22 2241  vancomycin - pharmacy to dose  (vancomycin IVPB)        "And" Linked Group Details    -- IV pharmacy to manage frequency 01/03/22 2141        Cultures were taken-   Microbiology Results (last 7 days)     Procedure Component Value Units Date/Time    Urine culture [[id number]]  (Abnormal) Collected: 01/03/22 1943    Order Status: Completed Specimen: Urine Updated: 01/05/22 0918     Urine Culture, Routine PRESUMPTIVE PROTEUS SPECIES  >100,000 cfu/ml  Identification and susceptibility pending      Narrative:      Specimen Source->Urine    Blood culture x two cultures. Draw prior to antibiotics. [[id number]] Collected: 01/03/22 1950    Order Status: Completed Specimen: Blood Updated: 01/05/22 0852     Blood Culture, Routine Gram stain peds bottle: Gram positive cocci in clusters resembling Staph      Results called to and read back by:Yvonne Gamez RN 01/04/2022  23:08    Narrative:      Aerobic and anaerobic    Blood culture x two cultures. Draw prior to antibiotics. [[id number]] Collected: 01/03/22 1950    Order Status: Completed Specimen: Blood Updated: 01/05/22 0613     Blood Culture, Routine No Growth to date      No Growth to date    Narrative:      Aerobic and anaerobic    Culture, Respiratory with Gram Stain [[id number]]     Order Status: No result Specimen: Respiratory         Latest lactate reviewed, they are-  Recent Labs   Lab 01/03/22 1950 01/03/22 2315 01/04/22  0350   LACTATE 2.6* 2.4* 1.4 "       Organ dysfunction indicated by tachycardia, tachypnea  Source- respiratory, urine      Source control Achieved by- IV abx  Follow blood cultures. Hopefully the staph is a contaminant

## 2022-01-05 NOTE — PROGRESS NOTES
[facility] Hospital Medicine  Progress Note    Patient Name: Genna Felix  MRN: [id number]  Patient Class: IP- Inpatient   Admission Date: 1/3/2022  Length of Stay: 2 days  Attending Physician: Caron Guzman MD  Primary Care Provider: Primary Doctor No        Subjective:     Principal Problem:Sepsis        HPI:  Genna Felix is a 61 year old female with a past medical history of anoxic brain injury s/p G tube and tracheostomy, PE, HTN, GERD who presented from [place] for further evaluation of tachycardia.  She was discharged from this facility on 12/27/21 with a diagnosis of pneumonia with rx for bactrim.  In the ED she is found to be tachycardic with WBC 14K, lactic acid 2.6 and evidence of UTI.  CXR reveals worsening multifocal pneumonia.  Pt is nonverbal and cannot contribute to history of present illness, therefore further information is limited.  While in the ED, IV zosyn was initiated and she was give an IVF bolus.  She will be admitted to the service of hospital medicine for continued monitoring and treatment.      Overview/Hospital Course:  No notes on file    Interval History: Pulmonology following. Sputum culture has not been collected. One blood culture bottle with gram positive cocci in clusters. Urine with presumptive proteus    Review of Systems   Unable to perform ROS: Patient nonverbal     Objective:     Vital Signs (Most Recent):  Temp: 96.44 °F (35.8 °C) (01/05/22 1400)  Pulse: 87 (01/05/22 1400)  Resp: (!) 33 (01/05/22 1400)  BP: 123/69 (01/05/22 1400)  SpO2: 97 % (01/05/22 1400) Vital Signs (24h Range):  Temp:  [95.9 °F (35.5 °C)-98.78 °F (37.1 °C)] 96.44 °F (35.8 °C)  Pulse:  [] 87  Resp:  [24-66] 33  SpO2:  [94 %-100 %] 97 %  BP: ()/(50-69) 123/69     Weight: 65.7 kg (144 lb 13.5 oz)  Body mass index is 28.29 kg/m².    Intake/Output Summary (Last 24 hours) at 1/5/2022 1408  Last data filed at 1/5/2022 1319  Gross per 24 hour   Intake 5305.15 ml   Output 1860  "ml   Net 3445.15 ml      Physical Exam  Vitals and nursing note reviewed.   Constitutional:       General: She is not in acute distress.     Appearance: She is well-developed. She is ill-appearing.      Comments: Chronic trach/vent dependent   HENT:      Head: Normocephalic and atraumatic.      Mouth/Throat:      Mouth: Mucous membranes are dry.   Cardiovascular:      Rate and Rhythm: Normal rate and regular rhythm.   Pulmonary:      Effort: Pulmonary effort is normal. No respiratory distress.      Breath sounds: Rhonchi present.   Abdominal:      General: Bowel sounds are normal.      Palpations: Abdomen is soft.      Tenderness: There is no abdominal tenderness.      Comments: g tube in place   Musculoskeletal:      Cervical back: Normal range of motion and neck supple.      Comments: Contractures to hands   Skin:     General: Skin is warm and dry.   Neurological:      GCS: GCS eye subscore is 1. GCS verbal subscore is 1. GCS motor subscore is 1.   Psychiatric:      Comments: Unable to assess         Significant Labs: All pertinent labs within the past 24 hours have been reviewed.    Significant Imaging: I have reviewed all pertinent imaging results/findings within the past 24 hours.      Assessment/Plan:      * Sepsis  This patient does have evidence of infective focus  My overall impression is sepsis. Vital signs were reviewed and noted in progress note.  Antibiotics given-   Antibiotics (From admission, onward)            Start     Stop Route Frequency Ordered    01/04/22 0900  mupirocin 2 % ointment         01/09 0859 Nasl 2 times daily 01/04/22 0142    01/04/22 0400  piperacillin-tazobactam 4.5 g in dextrose 5 % 100 mL IVPB (ready to mix system)         -- IV Every 8 hours (non-standard times) 01/03/22 2141    01/03/22 2241  vancomycin - pharmacy to dose  (vancomycin IVPB)        "And" Linked Group Details    -- IV pharmacy to manage frequency 01/03/22 2141        Cultures were taken-   Microbiology Results " (last 7 days)     Procedure Component Value Units Date/Time    Urine culture [[id number]]  (Abnormal) Collected: 01/03/22 1943    Order Status: Completed Specimen: Urine Updated: 01/05/22 0918     Urine Culture, Routine PRESUMPTIVE PROTEUS SPECIES  >100,000 cfu/ml  Identification and susceptibility pending      Narrative:      Specimen Source->Urine    Blood culture x two cultures. Draw prior to antibiotics. [[id number]] Collected: 01/03/22 1950    Order Status: Completed Specimen: Blood Updated: 01/05/22 0852     Blood Culture, Routine Gram stain peds bottle: Gram positive cocci in clusters resembling Staph      Results called to and read back by:Yvonne Gamez RN 01/04/2022  23:08    Narrative:      Aerobic and anaerobic    Blood culture x two cultures. Draw prior to antibiotics. [[id number]] Collected: 01/03/22 1950    Order Status: Completed Specimen: Blood Updated: 01/05/22 0613     Blood Culture, Routine No Growth to date      No Growth to date    Narrative:      Aerobic and anaerobic    Culture, Respiratory with Gram Stain [[id number]]     Order Status: No result Specimen: Respiratory         Latest lactate reviewed, they are-  Recent Labs   Lab 01/03/22 1950 01/03/22  2315 01/04/22  0350   LACTATE 2.6* 2.4* 1.4       Organ dysfunction indicated by tachycardia, tachypnea  Source- respiratory, urine      Source control Achieved by- IV abx  Follow blood cultures. Hopefully the staph is a contaminant    Hyponatremia        UTI (urinary tract infection)  IV zosyn; follow culture        Pressure injury of left buttock, stage 4  Consult wound care  Turn q2h        Ventilator associated pneumonia  Failed OP abx  Pt vent dependent-consult pulmonology for vent management  IV zosyn/IV vanc  Blood and sputum cx  Nebulizer treatments        Gastroesophageal reflux disease  Chronic; utilize pepcid acutely.      Essential hypertension  Chronic, stable.  Latest blood pressure and vitals reviewed-   Temp:  [93.56 °F (34.2  °C)-100.4 °F (38 °C)]   Pulse:  []   Resp:  [20-72]   BP: ()/(42-80)   SpO2:  [92 %-100 %] .   Home meds for hypertension were reviewed and noted below.   No chronic antihypertensives noted. Review of home meds reveals midodrine which has been reordered.      Will utilize p.r.n. blood pressure medication only if patient's blood pressure greater than  180/110 and she develops symptoms such as worsening chest pain or shortness of breath.        Malnutrition of moderate degree  Nutrition consult.      Chronic respiratory failure with hypoxia  Patient with Hypoxic Respiratory failure which is Chronic.  she is vent dependent. Supplemental oxygen was provided and noted- Vent Mode: A/CMV-VC  Oxygen Concentration (%):  [35-36] 35  Resp Rate Total:  [24 br/min-44 br/min] 31 br/min  Vt Set:  [350 mL-450 mL] 350 mL  PEEP/CPAP:  [5 cmH20-6.5 cmH20] 5.9 cmH20  Mean Airway Pressure:  [10.2 xfM31-38.1 cmH20] 10.2 cmH20.   Signs/symptoms of respiratory failure include- tachypnea and increased work of breathing. Contributing diagnoses includes - Aspiration and Pneumonia Labs and images were reviewed. Patient Has not had a recent ABG. Will treat underlying causes.    PEG (percutaneous endoscopic gastrostomy) status  Noted; care per nursing.  TF per nutrition recs        Tracheostomy dependence  Noted; care per nursing      Anoxic brain injury  Noted.  Turn q2h      Anemia of chronic disease  Patient's anemia is currently controlled. S/p 0 units of PRBCs.  Current CBC reviewed-   Lab Results   Component Value Date    HGB 7.2 (L) 01/05/2022    HCT 23.6 (L) 01/05/2022    MCV 91 01/05/2022     01/05/2022     Monitor serial CBC and transfuse if patient becomes hemodynamically unstable, symptomatic or H/H drops below 7/21.           VTE Risk Mitigation (From admission, onward)         Ordered     enoxaparin injection 40 mg  Daily         01/04/22 0034     IP VTE HIGH RISK PATIENT  Once         01/04/22 0034     Place  sequential compression device  Until discontinued         01/04/22 0034                Discharge Planning   [place]:      Code Status: Full Code   Is the patient medically ready for discharge?:     Reason for patient still in hospital (select all that apply): Patient trending condition and Treatment  Discharge Plan A: Return to nursing home            Critical care time spent on the evaluation and treatment of severe organ dysfunction, review of pertinent labs and imaging studies, discussions with consulting providers and discussions with patient/family: 35 minutes.      Caron Guzman MD  Department of Hospital Medicine   [facility]

## 2022-01-05 NOTE — PLAN OF CARE
Problem: Feeding Intolerance (Enteral Nutrition)  Goal: Feeding Tolerance  Outcome: Ongoing, Progressing     Problem: Noninvasive Ventilation Acute  Goal: Effective Unassisted Ventilation and Oxygenation  Outcome: Ongoing, Not Progressing     Problem: Adult Inpatient Plan of Care  Goal: Plan of Care Review  Outcome: Ongoing, Not Progressing     Problem: Infection Progression (Sepsis/Septic Shock)  Goal: Absence of Infection Signs and Symptoms  Outcome: Ongoing, Not Progressing     POC reviewed with the patient; ANGELA understanding due to clinical condition. Patient remains ventilated/trach at this time. Oral care performed with CHG mouthwash. Notified RT of respiratory culture needing collection. NSR on monitor. MAP >60 throughout shift. Beth martínez utilized part of shift to maintain normothermia; off currently. PEG in place--tolerating TF at goal, minimal residuals. Potassium and magnesium replaced this shift--lab rechecks WDL. [place] remains intact. No BM this shift. Bed locked in lowest position with bed alarm set, call light within reach. Safety precautions maintained.

## 2022-01-05 NOTE — CONSULTS
Consulted for wound care. Patient with shearing noted to her right elbow with open areas to the level of a stage 2 within this shearing area. Left forearm with discoloration noted and a small skin tear. Perirectal area has incontinent associated dermatitis which is minimal and patient also has external hemorrhoids present. Calmoseptine ointment to buttocks every shift and leave open to air. Notify wound care if needed.

## 2022-01-05 NOTE — PROGRESS NOTES
Progress Note  PULMONARY    Admit Date: 1/3/2022   01/05/2022      SUBJECTIVE:     1/5- no new issues      OBJECTIVE:     Vitals (Most recent):  Vitals:    01/05/22 0740   BP:    Pulse: 84   Resp: (!) 29   Temp: 97.7 °F (36.5 °C)       Vitals (24 hour range):  Temp:  [95.9 °F (35.5 °C)-100.4 °F (38 °C)]   Pulse:  []   Resp:  [22-66]   BP: ()/(42-64)   SpO2:  [94 %-100 %]       Intake/Output Summary (Last 24 hours) at 1/5/2022 0821  Last data filed at 1/5/2022 0743  Gross per 24 hour   Intake 4795.92 ml   Output 1960 ml   Net 2835.92 ml          Physical Exam:  The patient's neuro status (alertness,orientation,cognitive function,motor skills,), pharyngeal exam (oral lesions, hygiene, abn dentition,), Neck (jvd,mass,thyroid,nodes in neck and above/below clavicle),RESPIRATORY(symmetry,effort,fremitus,percussion,auscultation),  Cor(rhythm,heart tones including gallops,perfusion,edema)ABD(distention,hepatic&splenomegaly,tenderness,masses), Skin(rash,cyanosis),Psyc(affect,judgement,).  Exam negative except for these pertinent findings:    Unresponsive, eyes open and deviate to right, no distress  Trach w vent  Bilateral coarse breath sounds  Abdomen soft, PEG in place  Contracted upper ext  L hand with exudate between fingers, foul smelling and brown drainage on gown        Radiographs reviewed: view by direct vision   CXR 1/3/22- bilateral lower lobe infiltrates, worsened compared to previous film    Labs     Recent Labs   Lab 01/05/22  0408   WBC 4.70   HGB 7.2*   HCT 23.6*        Recent Labs   Lab 01/05/22  0408      K 3.6      CO2 17*   BUN 9   CREATININE 0.5   GLU 80   CALCIUM 8.4*   MG 1.7   PHOS 2.9   AST 14   ALT 27   ALKPHOS 141*   BILITOT 0.8   PROT 6.1   ALBUMIN 2.4*   No results for input(s): PH, PCO2, PO2, HCO3 in the last 24 hours.  Microbiology Results (last 7 days)     Procedure Component Value Units Date/Time    Blood culture x two cultures. Draw prior to antibiotics.  [[id number]] Collected: 01/03/22 1950    Order Status: Completed Specimen: Blood Updated: 01/05/22 0613     Blood Culture, Routine No Growth to date      No Growth to date    Narrative:      Aerobic and anaerobic    Blood culture x two cultures. Draw prior to antibiotics. [[id number]] Collected: 01/03/22 1950    Order Status: Completed Specimen: Blood Updated: 01/04/22 2309     Blood Culture, Routine Gram stain peds bottle: Gram positive cocci in clusters resembling Staph      Results called to and read back by:Yvonne Gamez RN 01/04/2022  23:08    Narrative:      Aerobic and anaerobic    Culture, Respiratory with Gram Stain [[id number]]     Order Status: No result Specimen: Respiratory     Urine culture [[id number]] Collected: 01/03/22 1943    Order Status: No result Specimen: Urine Updated: 01/03/22 2030          Impression:  Active Hospital Problems    Diagnosis  POA    *Sepsis [A41.9]  Yes    Hyponatremia [E87.1]  Yes    UTI (urinary tract infection) [N39.0]  Yes    Ventilator associated pneumonia [J95.851]  Yes    Essential hypertension [I10]  Yes    Gastroesophageal reflux disease [K21.9]  Yes    Malnutrition of moderate degree [E44.0]  Yes    Chronic respiratory failure with hypoxia [J96.11]  Yes    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  Added automatically from request for surgery [id number]        Resolved Hospital Problems   No resolved problems to display.               Plan:     - continue vent, settings appropriate, no weaning  - continue broad spectrum antibiotics  - follow up sputum culture. Urine with proteus  - tube feeds  - precedex as needed  - lovenox subq for DVT prophylaxis  - pepcid for GI prophylaxis  -  wound care for decub ulcer, hands  - continue midodrine  - watch Hgb- continues to decrease without signs of bleeding, goal >7    The following were evaluated and adjusted as needed: mechanical ventilator settings and weaning status, sedation and neurologic status, antibiotics and acid base balance and oxygenation needs       Critical Care  - THE PATIENT HAS A HIGH PROBABILITY OF IMMINENT OR LIFE THREATENING DETERIORATION.  Over 50%time of encounter was in direct care at bedside.  Time was 30 to 74 minutes required for patient care.  Time needed for all of the above totaled 34 minutes.      Dora Montesinos MD  Pulmonary & Critical Care Medicine                            .

## 2022-01-05 NOTE — PLAN OF CARE
POC discussed with patient. No evidence of understanding. BP remains borderline throughout shift. Spoke with Dr. Guzman about BP (see previous note). PICC line ordered but unable to be obtained. Dr. Guzman notified and plan to hold of on central line until pt needing pressor support. TF restarted per order. Antibiotics administered. Precedex turned off over shift and patient tolerating well. Will advance TF as tolerated and continue symptom management. Beth hugger removed because patient became quickly hyperthermic. Pt returned to normothermia once removed.       Problem: Adult Inpatient Plan of Care  Goal: Patient-Specific Goal (Individualized)  Outcome: Ongoing, Progressing  Goal: Optimal Comfort and Wellbeing  Outcome: Ongoing, Progressing     Problem: Glycemic Control Impaired (Sepsis/Septic Shock)  Goal: Blood Glucose Level Within Desired Range  Outcome: Ongoing, Progressing     Problem: Fall Injury Risk  Goal: Absence of Fall and Fall-Related Injury  Outcome: Ongoing, Progressing     Problem: Skin Injury Risk Increased  Goal: Skin Health and Integrity  Outcome: Ongoing, Progressing     Problem: Aspiration (Enteral Nutrition)  Goal: Absence of Aspiration Signs and Symptoms  Outcome: Ongoing, Progressing     Problem: Device-Related Complication Risk (Enteral Nutrition)  Goal: Safe, Effective Therapy Delivery  Outcome: Ongoing, Progressing     Problem: Feeding Intolerance (Enteral Nutrition)  Goal: Feeding Tolerance  Outcome: Ongoing, Progressing

## 2022-01-05 NOTE — PLAN OF CARE
No incidents overnight.  VSS, MAP > 60.  Tolerated tube feed.  Up to goal.        Problem: Feeding Intolerance (Enteral Nutrition)  Goal: Feeding Tolerance  Outcome: Ongoing, Progressing     Problem: Adult Inpatient Plan of Care  Goal: Optimal Comfort and Wellbeing  Outcome: Ongoing, Progressing     Problem: Adult Inpatient Plan of Care  Goal: Absence of Hospital-Acquired Illness or Injury  Outcome: Ongoing, Progressing

## 2022-01-05 NOTE — PROGRESS NOTES
Pharmacokinetic Assessment Follow Up: IV Vancomycin    Vancomycin serum concentration assessment(s):    The random level was drawn correctly and can be used to guide therapy at this time. The measurement is below the desired definitive target range of 15 to 20 mcg/mL.    Vancomycin Regimen Plan:    Change regimen to Vancomycin 1250 mg IV every x1 with next serum vancomycin concentration measured at 0500 prior to next dose on 1/6/22.    Drug levels (last 3 results):  Recent Labs   Lab Result Units 01/04/22  1254 01/05/22  0849   Vancomycin, Random ug/mL 8.3 8.6       Pharmacy will continue to follow and monitor vancomycin.    Please contact pharmacy at extension [phone] for questions regarding this assessment.    Thank you for the consult,   Bradford Spencer, PharmD  [phone]         Patient brief summary:  Genna Felix is a 61 y.o. female initiated on antimicrobial therapy with IV Vancomycin for treatment of lower respiratory infection    The patient's current regimen is vancomycin pulse dosing.    Drug Allergies:   Review of patient's allergies indicates:  No Known Allergies    Actual Body Weight:   65.7 kg (144 lb 13.5 oz)    Renal Function:   Estimated Creatinine Clearance: 102.5 mL/min (based on SCr of 0.5 mg/dL).,     Dialysis Method (if applicable):  N/A    CBC (last 72 hours):  Recent Labs   Lab Result Units 01/03/22 1918 01/04/22  0350 01/05/22  0408   WBC K/uL 14.01* 10.71 4.70   Hemoglobin g/dL 11.8* 8.9* 7.2*   Hematocrit % 36.5* 27.6* 23.6*   Platelets K/uL 346 305 274   Gran % % 87.5* 82.7* 62.5   Lymph % % 5.6* 7.9* 21.1   Mono % % 5.1 7.7 13.2   Eosinophil % % 1.1 1.1 2.8   Basophil % % 0.2 0.2 0.2   Differential Method  Automated Automated Automated       Metabolic Panel (last 72 hours):  Recent Labs   Lab Result Units 01/03/22 1918 01/03/22 1943 01/04/22  0350 01/05/22  0408   Sodium mmol/L 123*  --  132* 137   Potassium mmol/L 3.6  --  3.4* 3.6   Chloride mmol/L 92*  --  102 109   CO2 mmol/L 18*   --  18* 17*   Glucose mg/dL 148*  --  106 80   Glucose, UA   --  Negative  --   --    BUN mg/dL 21*  --  17 9   Creatinine mg/dL 0.6  --  0.5 0.5   Albumin g/dL 3.2*  --  2.7* 2.4*   Total Bilirubin mg/dL 1.1*  --  1.1* 0.8   Alkaline Phosphatase U/L 222*  --  181* 141*   AST U/L 25  --  18 14   ALT U/L 46*  --  35 27   Magnesium mg/dL  --   --  1.6 1.7   Phosphorus mg/dL  --   --  3.2 2.9       Vancomycin Administrations:  vancomycin given in the last 96 hours                     vancomycin 750 mg in dextrose 5 % 250 mL IVPB (ready to mix system) (mg) 750 mg New Bag 01/04/22 1755    vancomycin in dextrose 5 % 1 gram/250 mL IVPB 1,000 mg (mg) 1,000 mg New Bag 01/03/22 2010                    Microbiologic Results:  Microbiology Results (last 7 days)       Procedure Component Value Units Date/Time    Urine culture [[id number]]  (Abnormal) Collected: 01/03/22 1943    Order Status: Completed Specimen: Urine Updated: 01/05/22 0918     Urine Culture, Routine PRESUMPTIVE PROTEUS SPECIES  >100,000 cfu/ml  Identification and susceptibility pending      Narrative:      Specimen Source->Urine    Blood culture x two cultures. Draw prior to antibiotics. [[id number]] Collected: 01/03/22 1950    Order Status: Completed Specimen: Blood Updated: 01/05/22 0852     Blood Culture, Routine Gram stain peds bottle: Gram positive cocci in clusters resembling Staph      Results called to and read back by:Yvonne Gamez RN 01/04/2022  23:08    Narrative:      Aerobic and anaerobic    Blood culture x two cultures. Draw prior to antibiotics. [[id number]] Collected: 01/03/22 1950    Order Status: Completed Specimen: Blood Updated: 01/05/22 0613     Blood Culture, Routine No Growth to date      No Growth to date    Narrative:      Aerobic and anaerobic    Culture, Respiratory with Gram Stain [[id number]]     Order Status: No result Specimen: Respiratory

## 2022-01-05 NOTE — SUBJECTIVE & OBJECTIVE
Interval History: Pulmonology following. Sputum culture has not been collected. One blood culture bottle with gram positive cocci in clusters. Urine with presumptive proteus    Review of Systems   Unable to perform ROS: Patient nonverbal     Objective:     Vital Signs (Most Recent):  Temp: 96.44 °F (35.8 °C) (01/05/22 1400)  Pulse: 87 (01/05/22 1400)  Resp: (!) 33 (01/05/22 1400)  BP: 123/69 (01/05/22 1400)  SpO2: 97 % (01/05/22 1400) Vital Signs (24h Range):  Temp:  [95.9 °F (35.5 °C)-98.78 °F (37.1 °C)] 96.44 °F (35.8 °C)  Pulse:  [] 87  Resp:  [24-66] 33  SpO2:  [94 %-100 %] 97 %  BP: ()/(50-69) 123/69     Weight: 65.7 kg (144 lb 13.5 oz)  Body mass index is 28.29 kg/m².    Intake/Output Summary (Last 24 hours) at 1/5/2022 1408  Last data filed at 1/5/2022 1319  Gross per 24 hour   Intake 5305.15 ml   Output 1860 ml   Net 3445.15 ml      Physical Exam  Vitals and nursing note reviewed.   Constitutional:       General: She is not in acute distress.     Appearance: She is well-developed. She is ill-appearing.      Comments: Chronic trach/vent dependent   HENT:      Head: Normocephalic and atraumatic.      Mouth/Throat:      Mouth: Mucous membranes are dry.   Cardiovascular:      Rate and Rhythm: Normal rate and regular rhythm.   Pulmonary:      Effort: Pulmonary effort is normal. No respiratory distress.      Breath sounds: Rhonchi present.   Abdominal:      General: Bowel sounds are normal.      Palpations: Abdomen is soft.      Tenderness: There is no abdominal tenderness.      Comments: g tube in place   Musculoskeletal:      Cervical back: Normal range of motion and neck supple.      Comments: Contractures to hands   Skin:     General: Skin is warm and dry.   Neurological:      GCS: GCS eye subscore is 1. GCS verbal subscore is 1. GCS motor subscore is 1.   Psychiatric:      Comments: Unable to assess         Significant Labs: All pertinent labs within the past 24 hours have been  reviewed.    Significant Imaging: I have reviewed all pertinent imaging results/findings within the past 24 hours.

## 2022-01-05 NOTE — ASSESSMENT & PLAN NOTE
Patient's anemia is currently controlled. S/p 0 units of PRBCs.  Current CBC reviewed-   Lab Results   Component Value Date    HGB 7.2 (L) 01/05/2022    HCT 23.6 (L) 01/05/2022    MCV 91 01/05/2022     01/05/2022     Monitor serial CBC and transfuse if patient becomes hemodynamically unstable, symptomatic or H/H drops below 7/21.

## 2022-01-05 NOTE — CARE UPDATE
01/04/22 2026   Patient Assessment/Suction   Respiratory Effort Unlabored   Expansion/Accessory Muscles/Retractions no use of accessory muscles;expansion symmetric;no retractions   All Lung Fields Breath Sounds Anterior:;Lateral:;diminished   Rhythm/Pattern, Respiratory assisted mechanically   Cough Frequency with stimulation   Cough Type assisted   Suction Method tracheal   $ Suction Charges Inline Suction Procedure Stat Charge   Secretions Amount small   Secretions Color yellow;white   Secretions Characteristics thick   PRE-TX-O2   O2 Device (Oxygen Therapy) ventilator   $ Is the patient on Low Flow Oxygen? Yes   Oxygen Concentration (%) 35   SpO2 100 %   Pulse Oximetry Type Continuous   $ Pulse Oximetry - Multiple Charge Pulse Oximetry - Multiple   Pulse 85   Resp (!) 26   Temp 96.08 °F (35.6 °C)   ETCO2   $ ETCO2 Usage Currently wearing   ETCO2 (mmHg) 30 mmHg   ETCO2 Device Type Bedside Monitor;Artificial Airway;Ventilator   Aerosol Therapy   $ Aerosol Therapy Charges Aerosol Treatment   Respiratory Treatment Status (SVN) given   Treatment Route (SVN) in-line;ventilator   Patient Position (SVN) HOB elevated   Post Treatment Assessment (SVN) breath sounds unchanged   Signs of Intolerance (SVN) none   Breath Sounds Post-Respiratory Treatment   Throughout All Fields Post-Treatment All Fields   Throughout All Fields Post-Treatment no change        Surgical Airway Portex Cuffed   No placement date or time found.   Present Prior to Hospital Arrival?: Yes  Type: Tracheostomy  Brand: Portex  Airway Device Size: 8.0  Style: Cuffed   Cuff Pressure 29 cm H2O   Cuff Inflation? Inflated   Status Secured   Site Assessment Midline   Ties Assessment Intact;Secure   Airway Safety   Ambu bag with the patient? Yes, Adult Ambu   Is mask with the patient? Yes, Adult Mask   Suction set is at the bedside? Yes   Vent Select   Conventional Vent Y   $ Ventilator Subsequent 1   Charged w/in last 24h YES   Preset Conventional Ventilator  Settings   Vent Type NKV-550   Ventilation Type VC   Vent Mode A/CMV-VC   Humidity HME   Set Rate 24 BPM   Vt Set 350 mL   PEEP/CPAP 3.6 cmH20   Trigger Sensitivity Flow/I-Trigger 2.5 L/min   Patient Ventilator Parameters   Resp Rate Total 26 br/min   Peak Airway Pressure 20.8 cmH2O   Mean Airway Pressure 13.7 cmH20   Exhaled Vt 76 mL   Total Ve 7.91 mL   I:E Ratio Measured 1.7:1   Inspired Tidal Volume (VTI) 358 mL   Conventional Ventilator Alarms   Alarms On Y   Ve High Alarm 20 L/min   Ve Low Alarm 3 L/min   Vt High Alarm 15 mL   Vt Low Alarm 4 mL   Resp Rate High Alarm 50 br/min   Press High Alarm 50 cmH2O   Apnea Rate 15   Apnea Volume (mL) 450 mL   Delay Alarm (Sec) 30 sec(s)   Ready to Wean/Extubation Screen   FIO2<=50 (chart decimal) 0.35   MV<16L (chart vol.) 7.91   PEEP <=8 (chart #) 3.6   Ready to Wean Parameters   F/VT Ratio<105 (RSBI) 342.11

## 2022-01-06 LAB
ALBUMIN SERPL BCP-MCNC: 2.5 G/DL (ref 3.5–5.2)
ALP SERPL-CCNC: 152 U/L (ref 55–135)
ALT SERPL W/O P-5'-P-CCNC: 26 U/L (ref 10–44)
ANION GAP SERPL CALC-SCNC: 10 MMOL/L (ref 8–16)
AST SERPL-CCNC: 15 U/L (ref 10–40)
BACTERIA BLD CULT: ABNORMAL
BASOPHILS # BLD AUTO: 0.01 K/UL (ref 0–0.2)
BASOPHILS NFR BLD: 0.2 % (ref 0–1.9)
BILIRUB SERPL-MCNC: 0.7 MG/DL (ref 0.1–1)
BUN SERPL-MCNC: 16 MG/DL (ref 6–20)
CALCIUM SERPL-MCNC: 9.2 MG/DL (ref 8.7–10.5)
CHLORIDE SERPL-SCNC: 107 MMOL/L (ref 95–110)
CO2 SERPL-SCNC: 20 MMOL/L (ref 23–29)
CREAT SERPL-MCNC: 0.5 MG/DL (ref 0.5–1.4)
DIFFERENTIAL METHOD: ABNORMAL
EOSINOPHIL # BLD AUTO: 0.2 K/UL (ref 0–0.5)
EOSINOPHIL NFR BLD: 4.7 % (ref 0–8)
ERYTHROCYTE [DISTWIDTH] IN BLOOD BY AUTOMATED COUNT: 17.1 % (ref 11.5–14.5)
EST. GFR  (AFRICAN AMERICAN): >60 ML/MIN/1.73 M^2
EST. GFR  (NON AFRICAN AMERICAN): >60 ML/MIN/1.73 M^2
GLUCOSE SERPL-MCNC: 102 MG/DL (ref 70–110)
HCT VFR BLD AUTO: 26.9 % (ref 37–48.5)
HGB BLD-MCNC: 8.2 G/DL (ref 12–16)
IMM GRANULOCYTES # BLD AUTO: 0 K/UL (ref 0–0.04)
IMM GRANULOCYTES NFR BLD AUTO: 0 % (ref 0–0.5)
LYMPHOCYTES # BLD AUTO: 1.1 K/UL (ref 1–4.8)
LYMPHOCYTES NFR BLD: 22.6 % (ref 18–48)
MAGNESIUM SERPL-MCNC: 1.8 MG/DL (ref 1.6–2.6)
MCH RBC QN AUTO: 27.9 PG (ref 27–31)
MCHC RBC AUTO-ENTMCNC: 30.5 G/DL (ref 32–36)
MCV RBC AUTO: 92 FL (ref 82–98)
MONOCYTES # BLD AUTO: 0.5 K/UL (ref 0.3–1)
MONOCYTES NFR BLD: 10.4 % (ref 4–15)
NEUTROPHILS # BLD AUTO: 3.1 K/UL (ref 1.8–7.7)
NEUTROPHILS NFR BLD: 62.1 % (ref 38–73)
NRBC BLD-RTO: 0 /100 WBC
PHOSPHATE SERPL-MCNC: 2.6 MG/DL (ref 2.7–4.5)
PLATELET # BLD AUTO: 302 K/UL (ref 150–450)
PMV BLD AUTO: 10.2 FL (ref 9.2–12.9)
POCT GLUCOSE: 101 MG/DL (ref 70–110)
POCT GLUCOSE: 124 MG/DL (ref 70–110)
POCT GLUCOSE: 132 MG/DL (ref 70–110)
POCT GLUCOSE: 133 MG/DL (ref 70–110)
POCT GLUCOSE: 134 MG/DL (ref 70–110)
POTASSIUM SERPL-SCNC: 4.4 MMOL/L (ref 3.5–5.1)
PROT SERPL-MCNC: 6.9 G/DL (ref 6–8.4)
RBC # BLD AUTO: 2.94 M/UL (ref 4–5.4)
SODIUM SERPL-SCNC: 137 MMOL/L (ref 136–145)
VANCOMYCIN SERPL-MCNC: 11.7 UG/ML
WBC # BLD AUTO: 4.91 K/UL (ref 3.9–12.7)

## 2022-01-06 PROCEDURE — 25000003 PHARM REV CODE 250: Performed by: SURGERY

## 2022-01-06 PROCEDURE — 27000207 HC ISOLATION

## 2022-01-06 PROCEDURE — 20000000 HC ICU ROOM

## 2022-01-06 PROCEDURE — 85025 COMPLETE CBC W/AUTO DIFF WBC: CPT | Performed by: NURSE PRACTITIONER

## 2022-01-06 PROCEDURE — 84100 ASSAY OF PHOSPHORUS: CPT | Performed by: NURSE PRACTITIONER

## 2022-01-06 PROCEDURE — 25000003 PHARM REV CODE 250: Performed by: HOSPITALIST

## 2022-01-06 PROCEDURE — 25000003 PHARM REV CODE 250: Performed by: NURSE PRACTITIONER

## 2022-01-06 PROCEDURE — 25000242 PHARM REV CODE 250 ALT 637 W/ HCPCS: Performed by: NURSE PRACTITIONER

## 2022-01-06 PROCEDURE — 94761 N-INVAS EAR/PLS OXIMETRY MLT: CPT

## 2022-01-06 PROCEDURE — 83735 ASSAY OF MAGNESIUM: CPT | Performed by: NURSE PRACTITIONER

## 2022-01-06 PROCEDURE — 99900026 HC AIRWAY MAINTENANCE (STAT)

## 2022-01-06 PROCEDURE — 94640 AIRWAY INHALATION TREATMENT: CPT

## 2022-01-06 PROCEDURE — 80202 ASSAY OF VANCOMYCIN: CPT | Performed by: HOSPITALIST

## 2022-01-06 PROCEDURE — 99900035 HC TECH TIME PER 15 MIN (STAT)

## 2022-01-06 PROCEDURE — 63600175 PHARM REV CODE 636 W HCPCS: Performed by: NURSE PRACTITIONER

## 2022-01-06 PROCEDURE — 36415 COLL VENOUS BLD VENIPUNCTURE: CPT | Performed by: HOSPITALIST

## 2022-01-06 PROCEDURE — 27000221 HC OXYGEN, UP TO 24 HOURS

## 2022-01-06 PROCEDURE — 80053 COMPREHEN METABOLIC PANEL: CPT | Performed by: NURSE PRACTITIONER

## 2022-01-06 PROCEDURE — 94003 VENT MGMT INPAT SUBQ DAY: CPT

## 2022-01-06 PROCEDURE — 63600175 PHARM REV CODE 636 W HCPCS: Performed by: HOSPITALIST

## 2022-01-06 PROCEDURE — 99233 SBSQ HOSP IP/OBS HIGH 50: CPT | Mod: ,,, | Performed by: INTERNAL MEDICINE

## 2022-01-06 PROCEDURE — 99233 PR SUBSEQUENT HOSPITAL CARE,LEVL III: ICD-10-PCS | Mod: ,,, | Performed by: INTERNAL MEDICINE

## 2022-01-06 RX ORDER — SODIUM,POTASSIUM PHOSPHATES 280-250MG
2 POWDER IN PACKET (EA) ORAL
Status: DISCONTINUED | OUTPATIENT
Start: 2022-01-06 | End: 2022-01-21 | Stop reason: HOSPADM

## 2022-01-06 RX ADMIN — IPRATROPIUM BROMIDE AND ALBUTEROL SULFATE 3 ML: 2.5; .5 SOLUTION RESPIRATORY (INHALATION) at 07:01

## 2022-01-06 RX ADMIN — HYPROMELLOSE 2910 1 DROP: 5 SOLUTION OPHTHALMIC at 08:01

## 2022-01-06 RX ADMIN — IPRATROPIUM BROMIDE AND ALBUTEROL SULFATE 3 ML: 2.5; .5 SOLUTION RESPIRATORY (INHALATION) at 12:01

## 2022-01-06 RX ADMIN — MUPIROCIN: 20 OINTMENT TOPICAL at 08:01

## 2022-01-06 RX ADMIN — MAGNESIUM SULFATE 2 G: 2 INJECTION INTRAVENOUS at 08:01

## 2022-01-06 RX ADMIN — CHLORHEXIDINE GLUCONATE 0.12% ORAL RINSE 15 ML: 1.2 LIQUID ORAL at 08:01

## 2022-01-06 RX ADMIN — SODIUM CHLORIDE: 0.9 INJECTION, SOLUTION INTRAVENOUS at 01:01

## 2022-01-06 RX ADMIN — PIPERACILLIN AND TAZOBACTAM 4.5 G: 4; .5 INJECTION, POWDER, LYOPHILIZED, FOR SOLUTION INTRAVENOUS; PARENTERAL at 06:01

## 2022-01-06 RX ADMIN — HYPROMELLOSE 2910 1 DROP: 5 SOLUTION OPHTHALMIC at 04:01

## 2022-01-06 RX ADMIN — SODIUM CHLORIDE: 0.9 INJECTION, SOLUTION INTRAVENOUS at 08:01

## 2022-01-06 RX ADMIN — FAMOTIDINE 20 MG: 10 INJECTION INTRAVENOUS at 08:01

## 2022-01-06 RX ADMIN — PIPERACILLIN AND TAZOBACTAM 4.5 G: 4; .5 INJECTION, POWDER, LYOPHILIZED, FOR SOLUTION INTRAVENOUS; PARENTERAL at 10:01

## 2022-01-06 RX ADMIN — HYPROMELLOSE 2910 1 DROP: 5 SOLUTION OPHTHALMIC at 07:01

## 2022-01-06 RX ADMIN — VANCOMYCIN HYDROCHLORIDE 1250 MG: 1.25 INJECTION, POWDER, LYOPHILIZED, FOR SOLUTION INTRAVENOUS at 06:01

## 2022-01-06 RX ADMIN — POTASSIUM & SODIUM PHOSPHATES POWDER PACK 280-160-250 MG 2 PACKET: 280-160-250 PACK at 08:01

## 2022-01-06 RX ADMIN — PIPERACILLIN AND TAZOBACTAM 4.5 G: 4; .5 INJECTION, POWDER, LYOPHILIZED, FOR SOLUTION INTRAVENOUS; PARENTERAL at 02:01

## 2022-01-06 RX ADMIN — IPRATROPIUM BROMIDE AND ALBUTEROL SULFATE 3 ML: 2.5; .5 SOLUTION RESPIRATORY (INHALATION) at 04:01

## 2022-01-06 RX ADMIN — POTASSIUM & SODIUM PHOSPHATES POWDER PACK 280-160-250 MG 2 PACKET: 280-160-250 PACK at 02:01

## 2022-01-06 RX ADMIN — IPRATROPIUM BROMIDE AND ALBUTEROL SULFATE 3 ML: 2.5; .5 SOLUTION RESPIRATORY (INHALATION) at 03:01

## 2022-01-06 RX ADMIN — MIDODRINE HYDROCHLORIDE 10 MG: 5 TABLET ORAL at 08:01

## 2022-01-06 RX ADMIN — SODIUM CHLORIDE: 0.9 INJECTION, SOLUTION INTRAVENOUS at 07:01

## 2022-01-06 RX ADMIN — SODIUM CHLORIDE: 0.9 INJECTION, SOLUTION INTRAVENOUS at 04:01

## 2022-01-06 RX ADMIN — ENOXAPARIN SODIUM 40 MG: 40 INJECTION SUBCUTANEOUS at 05:01

## 2022-01-06 NOTE — PLAN OF CARE
Problem: Communication Impairment (Mechanical Ventilation, Invasive)  Goal: Effective Communication  Outcome: Ongoing, Not Progressing     Problem: Inability to Wean (Mechanical Ventilation, Invasive)  Goal: Mechanical Ventilation Liberation  Outcome: Ongoing, Not Progressing     Problem: Nutrition Impairment (Mechanical Ventilation, Invasive)  Goal: Optimal Nutrition Delivery  Outcome: Ongoing, Progressing   On vent / trach, sputum specimen send. Oral care done with CHG.  No eye contact with nystagmus noted. Eyes deviated upward Warming blanket on. Sinus rhythm. Tube feeding Isosource 1.5 at 50, goal rate per peg tube. Minimal residual seen. Generalized edema noted.  Hamilton output large amount. Continue to monitor pt's labs. Mepilex intact to sacral area. Safety/ fall prevention. Bed alarms on.   Physician Progress Note      NATHANAELCohernando Schmitz  CSN #:                  [id number]  :                       3/5/1927  ADMIT DATE:       10/27/2021 1:42 PM  [address] DATE:  RESPONDING  PROVIDER #:        Sravan Long MD          QUERY TEXT:    Patient admitted with acute hypoxic respiratory failure. Patient noted to have   altered mental status, lethargy, pulse ox 70s in ER. If possible, please   document in the progress notes and discharge summary if you are evaluating and   / or treating any of the following: The medical record reflects the following:  Risk Factors: acute respiratory failure w/ hypoxia  CKD DM hx of stroke  Clinical Indicators: ER notes: presented to ED with altered mental status, was   hypoxic with SPO2 in the 70-80s on arrival, improved int the 90s with 4   liters of O2 via NC. 105//64  98.2  60-72  16-22  94%  bun cr 28 1.30   51.6  trop 0.049; per Dr Tali Oh consult note 10/28/21:  He was noted to be   hypoxemic at the time when the symptoms occur and therefore an encephalopathy   with this. Treatment: neurology cardiology consult O2 protocol    Thank you,  Adrien Fajardo BSN RN CDS   M-F 6am-2pm  Options provided:  -- Anoxic encephalopathy  -- Metabolic encephalopathy  -- Other - I will add my own diagnosis  -- Disagree - Not applicable / Not valid  -- Disagree - Clinically unable to determine / Unknown  -- Refer to Clinical Documentation Reviewer    PROVIDER RESPONSE TEXT:    Provider is clinically unable to determine a response to this query.     Query created by: Moriah Ford on 10/28/2021 11:22 AM      Electronically signed by:  Sravan Long MD 10/28/2021 4:34 PM

## 2022-01-06 NOTE — ASSESSMENT & PLAN NOTE
"This patient does have evidence of infective focus  My overall impression is sepsis. Vital signs were reviewed and noted in progress note.  Antibiotics given-   Antibiotics (From admission, onward)            Start     Stop Route Frequency Ordered    01/06/22 0630  vancomycin 1.25 g in dextrose 5% 250 mL IVPB (ready to mix)         -- IV Every 18 hours 01/06/22 0611    01/04/22 0900  mupirocin 2 % ointment         01/09 0859 Nasl 2 times daily 01/04/22 0142    01/04/22 0400  piperacillin-tazobactam 4.5 g in dextrose 5 % 100 mL IVPB (ready to mix system)         -- IV Every 8 hours (non-standard times) 01/03/22 2141    01/03/22 2241  vancomycin - pharmacy to dose  (vancomycin IVPB)        "And" Linked Group Details    -- IV pharmacy to manage frequency 01/03/22 2141        Cultures were taken-   Microbiology Results (last 7 days)     Procedure Component Value Units Date/Time    Blood culture x two cultures. Draw prior to antibiotics. [[id number]]  (Abnormal) Collected: 01/03/22 1950    Order Status: Completed Specimen: Blood Updated: 01/06/22 1313     Blood Culture, Routine Gram stain peds bottle: Gram positive cocci in clusters resembling Staph      Results called to and read back by:Yvonne Gamez RN 01/04/2022  23:08      COAGULASE-NEGATIVE STAPHYLOCOCCUS SPECIES  Organism is a probable contaminant      Narrative:      Aerobic and anaerobic    Culture, Respiratory with Gram Stain [[id number]] Collected: 01/05/22 2009    Order Status: Completed Specimen: Respiratory from Endotracheal Aspirate Updated: 01/06/22 0957     Gram Stain (Respiratory) <10 epithelial cells per low power field.     Gram Stain (Respiratory) Rare WBC's     Gram Stain (Respiratory) Few Gram negative rods     Gram Stain (Respiratory) Rare yeast    Blood culture x two cultures. Draw prior to antibiotics. [[id number]] Collected: 01/03/22 1950    Order Status: Completed Specimen: Blood Updated: 01/06/22 0612     Blood Culture, Routine No Growth to " date      No Growth to date      No Growth to date    Narrative:      Aerobic and anaerobic    Urine culture [[id number]]  (Abnormal) Collected: 01/03/22 1943    Order Status: Completed Specimen: Urine Updated: 01/05/22 0918     Urine Culture, Routine PRESUMPTIVE PROTEUS SPECIES  >100,000 cfu/ml  Identification and susceptibility pending      Narrative:      Specimen Source->Urine        Latest lactate reviewed, they are-  Recent Labs   Lab 01/03/22  1950 01/03/22  2315 01/04/22  0350   LACTATE 2.6* 2.4* 1.4       Organ dysfunction indicated by tachycardia, tachypnea  Source- respiratory, urine      Source control Achieved by- IV abx

## 2022-01-06 NOTE — HOSPITAL COURSE
61-year-old female with past medical history of anoxic brain injury, PE, hypertension, GERD admitted to the hospital medicine service for UTI and worsening multifocal pneumonia.  She was placed on broad-spectrum antibiotics.  Pulmonologist was consulted.  Urine culture grew presumptive Proteus.  Sputum culture grew Pseudomonas species, Serratia species, Klebsiella pneumoniae (ESBL).  During hospital stay patient noted to have increase gastric residuals.  No definite abdominal obstruction identified.  Patient underwent a CT scan of the abdomen and pelvis which suggested acute cholecystitis for which General surgery team was consulted.  Patient underwent HIDA scan which confirm cystic duct obstruction.  General surgeon recommended percutaneous cholecystostomy drain placement which was done by Interventional Radiology on January 13, 2022.  Services of infectious diseases specialist are being obtained who upgraded antibiotic therapy to intravenous Avycase.. IR drain in place, draining purulent bilious fluid. Repeat blood cultures no growth.    Procalcitonin was repeated normal, CRP down to 41, white blood cells been normal for several days, chest x-ray was improving. Drainage from cholecystostomy not purulent. Cholecystostomy drain putting out very little, 50 cc over the last 24 hours.  KUB today negative, done for abd distention. She had some hypotension and irritability today. Sepsis resolved, multifactorial; right pneumonia/UTI/acute cholecystitis s/p IR guided perc ирина 1/13    Bilious fluid cultures showed Klebsiella ESBL and Enterococcus faecalis.     Urine culture grew Proteus mirabilis likely ESBL.          Blood cultures from admission 1/4 bottles grew CoNS, likely a contaminant.          Respiratory culture 1/5 with multiple organisms; Serratia marcescens, ESBL Klebsiella pneumoniae, and  Pseudomonas, the latter susceptible to Avycaz, started 1/12.  Repeat blood cultures x 2 sets no growth pending final.   Patient was transferred to LT[facility] for continued of therapy.  As per ID recommendation, stopped  avycaz  CXR is improved .  Will be discharged on meropenem for ESBL Klebsiella in the biliary fluid and vancomycin for the enterococcus for another week, recommended to keep merrem dose on the low side for concern of seizure issues.  EEG was done was negative for any seizure activity.  Involuntary movement was thought to be related to my clinic checks from anoxic brain injury. repeat cholangiogram as outpatient  6 weeks. If cystic duct patent, remove tube, and if not, leave tube in indefinitely(per surgery

## 2022-01-06 NOTE — CARE UPDATE
01/05/22 1959   Patient Assessment/Suction   Respiratory Effort Unlabored   Expansion/Accessory Muscles/Retractions expansion symmetric;accessory muscle use   All Lung Fields Breath Sounds Anterior:;Lateral:;clear;diminished   Rhythm/Pattern, Respiratory assisted mechanically   Cough Frequency with stimulation   Cough Type assisted   Suction Method oral;tracheal   $ Suction Charges Inline Suction Procedure Stat Charge   Secretions Amount small   Secretions Color tan;yellow   Secretions Characteristics thick   Sputum Collection sample obtained per suctioning   $ Swab or suction? Suction   Aspirate Toleration TENA (no adverse reactions)   Sent to the lab? Yes   PRE-TX-O2   O2 Device (Oxygen Therapy) ventilator   $ Is the patient on Low Flow Oxygen? Yes   Oxygen Concentration (%) 35   SpO2 100 %   Pulse Oximetry Type Continuous   $ Pulse Oximetry - Multiple Charge Pulse Oximetry - Multiple   Pulse 78   Resp (!) 32   Temp 96.98 °F (36.1 °C)   ETCO2   $ ETCO2 Usage Currently wearing   ETCO2 (mmHg) 27 mmHg   ETCO2 Device Type Bedside Monitor;Ventilator;Artificial Airway   Aerosol Therapy   $ Aerosol Therapy Charges Aerosol Treatment   Respiratory Treatment Status (SVN) given   Treatment Route (SVN) in-line;ventilator   Patient Position (SVN) HOB elevated   Post Treatment Assessment (SVN) breath sounds unchanged   Signs of Intolerance (SVN) none   Breath Sounds Post-Respiratory Treatment   Throughout All Fields Post-Treatment All Fields   Throughout All Fields Post-Treatment no change   Post-treatment Heart Rate (beats/min) 85   Post-treatment Resp Rate (breaths/min) 33        Surgical Airway Portex Cuffed   No placement date or time found.   Present Prior to Hospital Arrival?: Yes  Type: Tracheostomy  Brand: Portex  Airway Device Size: 8.0  Style: Cuffed   Cuff Pressure   (mop)   Cuff Inflation? Inflated   Status Secured   Site Assessment Clean;Dry   Ties Assessment Clean;Dry;Intact;Secure   Airway Safety   Ambu bag  with the patient? Yes, Adult Ambu   Is mask with the patient? Yes, Adult Mask   Suction set is at the bedside? Yes   Extra trach at bedside? No   Is obturator available? No   Vent Select   Conventional Vent Y   $ Ventilator Subsequent 1   Charged w/in last 24h YES   Preset Conventional Ventilator Settings   Vent Type NKV-550   Ventilation Type VC   Vent Mode A/CMV-VC   Humidity HME   Set Rate 24 BPM   Vt Set 350 mL   PEEP/CPAP 4.7 cmH20   Insp Time 1 Sec(s)   I-Trigger Type  Flow   Trigger Sensitivity Flow/I-Trigger 2.5 L/min   Patient Ventilator Parameters   Resp Rate Total 32 br/min   Peak Airway Pressure 29.7 cmH2O   Mean Airway Pressure 14.8 cmH20   Exhaled Vt 288 mL   Total Ve 9.26 mL   I:E Ratio Measured 1.4:1   Total PEEP 5 cmH20   Inspired Tidal Volume (VTI) 328 mL   Conventional Ventilator Alarms   Alarms On Y   Ve High Alarm 20 L/min   Ve Low Alarm 3 L/min   Vt High Alarm 15 mL   Vt Low Alarm 4 mL   Resp Rate High Alarm 50 br/min   Press High Alarm 50 cmH2O   Apnea Rate 15   Apnea Volume (mL) 450 mL   Delay Alarm (Sec) 30 sec(s)   Ready to Wean/Extubation Screen   FIO2<=50 (chart decimal) 0.35   MV<16L (chart vol.) 9.26   PEEP <=8 (chart #) 4.7   Ready to Wean Parameters   F/VT Ratio<105 (RSBI) 111.11

## 2022-01-06 NOTE — CARE UPDATE
01/06/22 0715   Patient Assessment/Suction   Level of Consciousness (AVPU) alert   Respiratory Effort Unlabored   Expansion/Accessory Muscles/Retractions expansion symmetric   All Lung Fields Breath Sounds diminished   Rhythm/Pattern, Respiratory assisted mechanically   Cough Frequency with stimulation   Cough Type assisted   Suction Method oral;tracheal   $ Suction Charges Inline Suction Procedure Stat Charge   Secretions Amount moderate   Secretions Color yellow;tan   Secretions Characteristics thick   Aspirate Toleration TENA (no adverse reactions)   PRE-TX-O2   O2 Device (Oxygen Therapy) ventilator   $ Is the patient on Low Flow Oxygen? Yes   Oxygen Concentration (%) 35   SpO2 100 %   Pulse Oximetry Type Continuous   $ Pulse Oximetry - Multiple Charge Pulse Oximetry - Multiple   Pulse 96   Resp (!) 25   Temp 98.96 °F (37.2 °C)   ETCO2   $ ETCO2 Usage Currently wearing   ETCO2 (mmHg) 33 mmHg   ETCO2 Device Type Bedside Monitor;Artificial Airway   Aerosol Therapy   $ Aerosol Therapy Charges Aerosol Treatment   Respiratory Treatment Status (SVN) given   Treatment Route (SVN) in-line;ventilator   Patient Position (SVN) HOB elevated   Post Treatment Assessment (SVN) breath sounds unchanged   Signs of Intolerance (SVN) none   Breath Sounds Post-Respiratory Treatment   Throughout All Fields Post-Treatment All Fields   Throughout All Fields Post-Treatment no change   Post-treatment Heart Rate (beats/min) 96   Post-treatment Resp Rate (breaths/min) 25        Surgical Airway Portex Cuffed   No placement date or time found.   Present Prior to Hospital Arrival?: Yes  Type: Tracheostomy  Brand: Portex  Airway Device Size: 8.0  Style: Cuffed   Cuff Pressure 30 cm H2O   Cuff Inflation? Inflated   Status Secured   Site Assessment Drainage   Site Care Cleansed;Dried   Ties Assessment Clean;Dry;Secure   Airway Safety   Ambu bag with the patient? Yes, Adult Ambu   Is mask with the patient? Yes, Adult Mask   Suction set is at the  bedside? Yes   Respiratory Interventions   Cough And Deep Breathing unable to perform   Airway/Ventilation Management airway patency maintained;humidification applied;pulmonary hygiene promoted   Airway/Ventilation Support humidification applied;pulmonary hygiene promoted   Vent Select   Conventional Vent Y   $ Ventilator Subsequent 1   Charged w/in last 24h YES   Preset Conventional Ventilator Settings   Vent Type NKV-550   Ventilation Type VC   Vent Mode A/CMV-VC   Humidity HME   Set Rate 24 BPM   Vt Set 350 mL   PEEP/CPAP 5.5 cmH20   Patient Ventilator Parameters   Resp Rate Total 25 br/min   Peak Airway Pressure 21.2 cmH2O   Exhaled Vt 339 mL   Total Ve 8.2 mL   I:E Ratio Measured 1:1.5   Inspired Tidal Volume (VTI) 348 mL   Conventional Ventilator Alarms   Alarms On Y   Apnea Rate 15   Apnea Volume (mL) 450 mL   Ready to Wean/Extubation Screen   FIO2<=50 (chart decimal) 0.35   MV<16L (chart vol.) 8.2   PEEP <=8 (chart #) 5.5   Ready to Wean Parameters   F/VT Ratio<105 (RSBI) (!) 73.75

## 2022-01-06 NOTE — PROGRESS NOTES
[facility] Hospital Medicine  Progress Note    Patient Name: Genna Felix  MRN: [id number]  Patient Class: IP- Inpatient   Admission Date: 1/3/2022  Length of Stay: 3 days  Attending Physician: Caron Guzman MD  Primary Care Provider: Primary Doctor No        Subjective:     Principal Problem:Sepsis        HPI:  Genna Felix is a 61 year old female with a past medical history of anoxic brain injury s/p G tube and tracheostomy, PE, HTN, GERD who presented from [place] for further evaluation of tachycardia.  She was discharged from this facility on 12/27/21 with a diagnosis of pneumonia with rx for bactrim.  In the ED she is found to be tachycardic with WBC 14K, lactic acid 2.6 and evidence of UTI.  CXR reveals worsening multifocal pneumonia.  Pt is nonverbal and cannot contribute to history of present illness, therefore further information is limited.  While in the ED, IV zosyn was initiated and she was give an IVF bolus.  She will be admitted to the service of hospital medicine for continued monitoring and treatment.      Overview/Hospital Course:  No notes on file    Interval History: Pulmonology following. Sputum culture with few GNR. Blood culture with CoNS- contaminant.  Urine with presumptive proteus.     Review of Systems   Unable to perform ROS: Patient nonverbal     Objective:     Vital Signs (Most Recent):  Temp: 97.88 °F (36.6 °C) (01/06/22 1204)  Pulse: 81 (01/06/22 1204)  Resp: (!) 24 (01/06/22 1204)  BP: 133/62 (01/06/22 0600)  SpO2: 98 % (01/06/22 1204) Vital Signs (24h Range):  Temp:  [96.44 °F (35.8 °C)-98.96 °F (37.2 °C)] 97.88 °F (36.6 °C)  Pulse:  [] 81  Resp:  [18-72] 24  SpO2:  [97 %-100 %] 98 %  BP: (104-133)/(55-66) 133/62     Weight: 68.8 kg (151 lb 10.8 oz)  Body mass index is 29.62 kg/m².    Intake/Output Summary (Last 24 hours) at 1/6/2022 1445  Last data filed at 1/6/2022 1435  Gross per 24 hour   Intake 3904.31 ml   Output 5700 ml   Net -1795.69 ml     "  Physical Exam  Vitals and nursing note reviewed.   Constitutional:       General: She is not in acute distress.     Appearance: She is well-developed. She is ill-appearing.      Comments: Chronic trach/vent dependent   HENT:      Head: Normocephalic and atraumatic.      Mouth/Throat:      Mouth: Mucous membranes are dry.   Cardiovascular:      Rate and Rhythm: Normal rate and regular rhythm.   Pulmonary:      Effort: Pulmonary effort is normal. No respiratory distress.      Breath sounds: Rhonchi present.   Abdominal:      General: Bowel sounds are normal.      Palpations: Abdomen is soft.      Tenderness: There is no abdominal tenderness.      Comments: g tube in place   Musculoskeletal:      Cervical back: Normal range of motion and neck supple.      Comments: Contractures to hands   Skin:     General: Skin is warm and dry.   Neurological:      GCS: GCS eye subscore is 1. GCS verbal subscore is 1. GCS motor subscore is 1.   Psychiatric:      Comments: Unable to assess         Significant Labs: All pertinent labs within the past 24 hours have been reviewed.    Significant Imaging: I have reviewed all pertinent imaging results/findings within the past 24 hours.      Assessment/Plan:      * Sepsis  This patient does have evidence of infective focus  My overall impression is sepsis. Vital signs were reviewed and noted in progress note.  Antibiotics given-   Antibiotics (From admission, onward)            Start     Stop Route Frequency Ordered    01/06/22 0630  vancomycin 1.25 g in dextrose 5% 250 mL IVPB (ready to mix)         -- IV Every 18 hours 01/06/22 0611    01/04/22 0900  mupirocin 2 % ointment         01/09 0859 Nasl 2 times daily 01/04/22 0142    01/04/22 0400  piperacillin-tazobactam 4.5 g in dextrose 5 % 100 mL IVPB (ready to mix system)         -- IV Every 8 hours (non-standard times) 01/03/22 2141    01/03/22 2241  vancomycin - pharmacy to dose  (vancomycin IVPB)        "And" Linked Group Details    -- " IV pharmacy to manage frequency 01/03/22 2141        Cultures were taken-   Microbiology Results (last 7 days)     Procedure Component Value Units Date/Time    Blood culture x two cultures. Draw prior to antibiotics. [[id number]]  (Abnormal) Collected: 01/03/22 1950    Order Status: Completed Specimen: Blood Updated: 01/06/22 1313     Blood Culture, Routine Gram stain peds bottle: Gram positive cocci in clusters resembling Staph      Results called to and read back by:Yvonne Gamez RN 01/04/2022  23:08      COAGULASE-NEGATIVE STAPHYLOCOCCUS SPECIES  Organism is a probable contaminant      Narrative:      Aerobic and anaerobic    Culture, Respiratory with Gram Stain [[id number]] Collected: 01/05/22 2009    Order Status: Completed Specimen: Respiratory from Endotracheal Aspirate Updated: 01/06/22 0957     Gram Stain (Respiratory) <10 epithelial cells per low power field.     Gram Stain (Respiratory) Rare WBC's     Gram Stain (Respiratory) Few Gram negative rods     Gram Stain (Respiratory) Rare yeast    Blood culture x two cultures. Draw prior to antibiotics. [[id number]] Collected: 01/03/22 1950    Order Status: Completed Specimen: Blood Updated: 01/06/22 0612     Blood Culture, Routine No Growth to date      No Growth to date      No Growth to date    Narrative:      Aerobic and anaerobic    Urine culture [[id number]]  (Abnormal) Collected: 01/03/22 1943    Order Status: Completed Specimen: Urine Updated: 01/05/22 0918     Urine Culture, Routine PRESUMPTIVE PROTEUS SPECIES  >100,000 cfu/ml  Identification and susceptibility pending      Narrative:      Specimen Source->Urine        Latest lactate reviewed, they are-  Recent Labs   Lab 01/03/22  1950 01/03/22  2315 01/04/22  0350   LACTATE 2.6* 2.4* 1.4       Organ dysfunction indicated by tachycardia, tachypnea  Source- respiratory, urine      Source control Achieved by- IV abx      Hyponatremia        UTI (urinary tract infection)  IV zosyn; follow  culture        Pressure injury of left buttock, stage 4  Consult wound care  Turn q2h        Ventilator associated pneumonia  Failed OP abx  Pt vent dependent-consult pulmonology for vent management  IV zosyn/IV vanc  Blood and sputum cx  Nebulizer treatments        Gastroesophageal reflux disease  Chronic; utilize pepcid acutely.      Essential hypertension  Chronic, stable.  Latest blood pressure and vitals reviewed-   Temp:  [93.56 °F (34.2 °C)-100.4 °F (38 °C)]   Pulse:  []   Resp:  [20-72]   BP: ()/(42-80)   SpO2:  [92 %-100 %] .   Home meds for hypertension were reviewed and noted below.   No chronic antihypertensives noted. Review of home meds reveals midodrine which has been reordered.      Will utilize p.r.n. blood pressure medication only if patient's blood pressure greater than  180/110 and she develops symptoms such as worsening chest pain or shortness of breath.        Malnutrition of moderate degree  Nutrition consult.      Chronic respiratory failure with hypoxia  Patient with Hypoxic Respiratory failure which is Chronic.  she is vent dependent. Supplemental oxygen was provided and noted- Vent Mode: A/CMV-VC  Oxygen Concentration (%):  [35-36] 35  Resp Rate Total:  [24 br/min-44 br/min] 31 br/min  Vt Set:  [350 mL-450 mL] 350 mL  PEEP/CPAP:  [5 cmH20-6.5 cmH20] 5.9 cmH20  Mean Airway Pressure:  [10.2 wtM39-83.1 cmH20] 10.2 cmH20.   Signs/symptoms of respiratory failure include- tachypnea and increased work of breathing. Contributing diagnoses includes - Aspiration and Pneumonia Labs and images were reviewed. Patient Has not had a recent ABG. Will treat underlying causes.    PEG (percutaneous endoscopic gastrostomy) status  Noted; care per nursing.  TF per nutrition recs        Tracheostomy dependence  Noted; care per nursing      Anoxic brain injury  Noted.  Turn q2h      Anemia of chronic disease  Patient's anemia is currently controlled. S/p 0 units of PRBCs.  Current CBC reviewed-   Lab  Results   Component Value Date    HGB 8.2 (L) 01/06/2022    HCT 26.9 (L) 01/06/2022    MCV 92 01/06/2022     01/06/2022     Monitor serial CBC and transfuse if patient becomes hemodynamically unstable, symptomatic or H/H drops below 7/21.           VTE Risk Mitigation (From admission, onward)         Ordered     enoxaparin injection 40 mg  Daily         01/04/22 0034     IP VTE HIGH RISK PATIENT  Once         01/04/22 0034     Place sequential compression device  Until discontinued         01/04/22 0034                Discharge Planning   [place]:      Code Status: Full Code   Is the patient medically ready for discharge?:     Reason for patient still in hospital (select all that apply): Patient trending condition and Treatment  Discharge Plan A: Return to nursing home            Critical care time spent on the evaluation and treatment of severe organ dysfunction, review of pertinent labs and imaging studies, discussions with consulting providers and discussions with patient/family: 35 minutes.      Caron Guzman MD  Department of Hospital Medicine   [facility]

## 2022-01-06 NOTE — PROGRESS NOTES
Progress Note  PULMONARY    Admit Date: 1/3/2022   01/06/2022      SUBJECTIVE:     1/5- no new issues  1/6- no new issues      OBJECTIVE:     Vitals (Most recent):  Vitals:    01/06/22 0715   BP:    Pulse: 96   Resp: (!) 25   Temp: 98.96 °F (37.2 °C)       Vitals (24 hour range):  Temp:  [96.44 °F (35.8 °C)-98.96 °F (37.2 °C)]   Pulse:  []   Resp:  [18-72]   BP: ()/(55-69)   SpO2:  [95 %-100 %]       Intake/Output Summary (Last 24 hours) at 1/6/2022 0835  Last data filed at 1/6/2022 0717  Gross per 24 hour   Intake 4695.91 ml   Output 4550 ml   Net 145.91 ml          Physical Exam:  The patient's neuro status (alertness,orientation,cognitive function,motor skills,), pharyngeal exam (oral lesions, hygiene, abn dentition,), Neck (jvd,mass,thyroid,nodes in neck and above/below clavicle),RESPIRATORY(symmetry,effort,fremitus,percussion,auscultation),  Cor(rhythm,heart tones including gallops,perfusion,edema)ABD(distention,hepatic&splenomegaly,tenderness,masses), Skin(rash,cyanosis),Psyc(affect,judgement,).  Exam negative except for these pertinent findings:    Unresponsive, eyes open and deviate to right, grimaces, no distress  Trach w vent  Bilateral clear breath sounds  Abdomen soft, PEG in place  Contracted upper ext  L hand dressed        Radiographs reviewed: view by direct vision   CXR 1/3/22- bilateral lower lobe infiltrates, worsened compared to previous film    Labs     Recent Labs   Lab 01/06/22  0448   WBC 4.91   HGB 8.2*   HCT 26.9*        Recent Labs   Lab 01/06/22  0448      K 4.4      CO2 20*   BUN 16   CREATININE 0.5      CALCIUM 9.2   MG 1.8   PHOS 2.6*   AST 15   ALT 26   ALKPHOS 152*   BILITOT 0.7   PROT 6.9   ALBUMIN 2.5*   No results for input(s): PH, PCO2, PO2, HCO3 in the last 24 hours.  Microbiology Results (last 7 days)     Procedure Component Value Units Date/Time    Blood culture x two cultures. Draw prior to antibiotics. [[id number]] Collected: 01/03/22  1950    Order Status: Completed Specimen: Blood Updated: 01/06/22 0612     Blood Culture, Routine No Growth to date      No Growth to date      No Growth to date    Narrative:      Aerobic and anaerobic    Culture, Respiratory with Gram Stain [[id number]] Collected: 01/05/22 2009    Order Status: Sent Specimen: Respiratory from Endotracheal Aspirate Updated: 01/06/22 0438    Urine culture [[id number]]  (Abnormal) Collected: 01/03/22 1943    Order Status: Completed Specimen: Urine Updated: 01/05/22 0918     Urine Culture, Routine PRESUMPTIVE PROTEUS SPECIES  >100,000 cfu/ml  Identification and susceptibility pending      Narrative:      Specimen Source->Urine    Blood culture x two cultures. Draw prior to antibiotics. [[id number]] Collected: 01/03/22 1950    Order Status: Completed Specimen: Blood Updated: 01/05/22 0852     Blood Culture, Routine Gram stain peds bottle: Gram positive cocci in clusters resembling Staph      Results called to and read back by:Yvonne Gamez RN 01/04/2022  23:08    Narrative:      Aerobic and anaerobic          Impression:  Active Hospital Problems    Diagnosis  POA    *Sepsis [A41.9]  Yes    Hyponatremia [E87.1]  Yes    UTI (urinary tract infection) [N39.0]  Yes    Ventilator associated pneumonia [J95.851]  Yes    Essential hypertension [I10]  Yes    Gastroesophageal reflux disease [K21.9]  Yes    Malnutrition of moderate degree [E44.0]  Yes    Chronic respiratory failure with hypoxia [J96.11]  Yes    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  Added automatically from request for surgery [id number]        Resolved Hospital Problems   No resolved problems to display.               Plan:     - continue  vent, no weaning  - continue broad spectrum antibiotics  - follow up sputum culture. Urine with proteus  - tube feeds  - lovenox subq for DVT prophylaxis  - pepcid for GI prophylaxis  - wound care   - continue midodrine  - monitor Hgb      Dora Montesinos MD  Pulmonary & Critical Care Medicine                            .

## 2022-01-06 NOTE — ASSESSMENT & PLAN NOTE
Patient's anemia is currently controlled. S/p 0 units of PRBCs.  Current CBC reviewed-   Lab Results   Component Value Date    HGB 8.2 (L) 01/06/2022    HCT 26.9 (L) 01/06/2022    MCV 92 01/06/2022     01/06/2022     Monitor serial CBC and transfuse if patient becomes hemodynamically unstable, symptomatic or H/H drops below 7/21.

## 2022-01-06 NOTE — NURSING
Patient noted to have approx 45 cc residuals in peg tube prior to medication administration. Medications given in 8 oz water as santa being given at this time as well. Tube feeding restarted after medication administration and pump administering programed water bolus of 110 cc per order. After completion of water bolus, patient noted to be coughing and copious secretions in mouth resembling water/medications/santa. Tube feeding stopped, patient suctioned orally and via trach. Approx 100cc secretions noted. Patient SpO2 remained >95% through event.

## 2022-01-06 NOTE — PROGRESS NOTES
Pharmacokinetic Assessment Follow Up: IV Vancomycin    Vancomycin serum concentration assessment(s):    The random level was drawn correctly and can be used to guide therapy at this time. The measurement is below the desired definitive target range of 15 to 20 mcg/mL.    Vancomycin Regimen Plan:    Change regimen to Vancomycin 1250 mg IV every 18 hours with next serum trough concentration measured at 1730 prior to 3rd dose on 01/07/22    Drug levels (last 3 results):  Recent Labs   Lab Result Units 01/04/22  1254 01/05/22  0849 01/06/22  0448   Vancomycin, Random ug/mL 8.3 8.6 11.7       Pharmacy will continue to follow and monitor vancomycin.    Please contact pharmacy at extension [phone] for questions regarding this assessment.    Thank you for the consult,   Andrei Spencer       Patient brief summary:  Genna Felix is a 61 y.o. female initiated on antimicrobial therapy with IV Vancomycin for treatment of lower respiratory infection    Drug Allergies:   Review of patient's allergies indicates:  No Known Allergies    Actual Body Weight:   68.8kg    Renal Function:   Estimated Creatinine Clearance: 104.8 mL/min (based on SCr of 0.5 mg/dL).,     CBC (last 72 hours):  Recent Labs   Lab Result Units 01/03/22 1918 01/04/22  0350 01/05/22  0408 01/06/22  0448   WBC K/uL 14.01* 10.71 4.70 4.91   Hemoglobin g/dL 11.8* 8.9* 7.2* 8.2*   Hematocrit % 36.5* 27.6* 23.6* 26.9*   Platelets K/uL 346 305 274 302   Gran % % 87.5* 82.7* 62.5 62.1   Lymph % % 5.6* 7.9* 21.1 22.6   Mono % % 5.1 7.7 13.2 10.4   Eosinophil % % 1.1 1.1 2.8 4.7   Basophil % % 0.2 0.2 0.2 0.2   Differential Method  Automated Automated Automated Automated       Metabolic Panel (last 72 hours):  Recent Labs   Lab Result Units 01/03/22 1918 01/03/22  1943 01/04/22  0350 01/05/22  0408 01/05/22  1548 01/06/22  0448   Sodium mmol/L 123*  --  132* 137  --  137   Potassium mmol/L 3.6  --  3.4* 3.6 4.6 4.4   Chloride mmol/L 92*  --  102 109  --  107   CO2 mmol/L  18*  --  18* 17*  --  20*   Glucose mg/dL 148*  --  106 80  --  102   Glucose, UA   --  Negative  --   --   --   --    BUN mg/dL 21*  --  17 9  --  16   Creatinine mg/dL 0.6  --  0.5 0.5  --  0.5   Albumin g/dL 3.2*  --  2.7* 2.4*  --  2.5*   Total Bilirubin mg/dL 1.1*  --  1.1* 0.8  --  0.7   Alkaline Phosphatase U/L 222*  --  181* 141*  --  152*   AST U/L 25  --  18 14  --  15   ALT U/L 46*  --  35 27  --  26   Magnesium mg/dL  --   --  1.6 1.7 2.0 1.8   Phosphorus mg/dL  --   --  3.2 2.9  --  2.6*       Vancomycin Administrations:  vancomycin given in the last 96 hours                     vancomycin 1.25 g in dextrose 5% 250 mL IVPB (ready to mix) (mg) 1,250 mg New Bag 01/05/22 1134    vancomycin 750 mg in dextrose 5 % 250 mL IVPB (ready to mix system) (mg) 750 mg New Bag 01/04/22 1755    vancomycin in dextrose 5 % 1 gram/250 mL IVPB 1,000 mg (mg) 1,000 mg New Bag 01/03/22 2010                    Microbiologic Results:  Microbiology Results (last 7 days)       Procedure Component Value Units Date/Time    Blood culture x two cultures. Draw prior to antibiotics. [[id number]] Collected: 01/03/22 1950    Order Status: Completed Specimen: Blood Updated: 01/06/22 0612     Blood Culture, Routine No Growth to date      No Growth to date      No Growth to date    Narrative:      Aerobic and anaerobic    Culture, Respiratory with Gram Stain [[id number]] Collected: 01/05/22 2009    Order Status: Sent Specimen: Respiratory from Endotracheal Aspirate Updated: 01/06/22 0438    Urine culture [[id number]]  (Abnormal) Collected: 01/03/22 1943    Order Status: Completed Specimen: Urine Updated: 01/05/22 0918     Urine Culture, Routine PRESUMPTIVE PROTEUS SPECIES  >100,000 cfu/ml  Identification and susceptibility pending      Narrative:      Specimen Source->Urine    Blood culture x two cultures. Draw prior to antibiotics. [[id number]] Collected: 01/03/22 1950    Order Status: Completed Specimen: Blood Updated: 01/05/22 0852     Blood  Culture, Routine Gram stain peds bottle: Gram positive cocci in clusters resembling Staph      Results called to and read back by:Yvonne Gamez RN 01/04/2022  23:08    Narrative:      Aerobic and anaerobic

## 2022-01-06 NOTE — SUBJECTIVE & OBJECTIVE
Interval History: Pulmonology following. Sputum culture with few GNR. Blood culture with CoNS- contaminant.  Urine with presumptive proteus.     Review of Systems   Unable to perform ROS: Patient nonverbal     Objective:     Vital Signs (Most Recent):  Temp: 97.88 °F (36.6 °C) (01/06/22 1204)  Pulse: 81 (01/06/22 1204)  Resp: (!) 24 (01/06/22 1204)  BP: 133/62 (01/06/22 0600)  SpO2: 98 % (01/06/22 1204) Vital Signs (24h Range):  Temp:  [96.44 °F (35.8 °C)-98.96 °F (37.2 °C)] 97.88 °F (36.6 °C)  Pulse:  [] 81  Resp:  [18-72] 24  SpO2:  [97 %-100 %] 98 %  BP: (104-133)/(55-66) 133/62     Weight: 68.8 kg (151 lb 10.8 oz)  Body mass index is 29.62 kg/m².    Intake/Output Summary (Last 24 hours) at 1/6/2022 1445  Last data filed at 1/6/2022 1435  Gross per 24 hour   Intake 3904.31 ml   Output 5700 ml   Net -1795.69 ml      Physical Exam  Vitals and nursing note reviewed.   Constitutional:       General: She is not in acute distress.     Appearance: She is well-developed. She is ill-appearing.      Comments: Chronic trach/vent dependent   HENT:      Head: Normocephalic and atraumatic.      Mouth/Throat:      Mouth: Mucous membranes are dry.   Cardiovascular:      Rate and Rhythm: Normal rate and regular rhythm.   Pulmonary:      Effort: Pulmonary effort is normal. No respiratory distress.      Breath sounds: Rhonchi present.   Abdominal:      General: Bowel sounds are normal.      Palpations: Abdomen is soft.      Tenderness: There is no abdominal tenderness.      Comments: g tube in place   Musculoskeletal:      Cervical back: Normal range of motion and neck supple.      Comments: Contractures to hands   Skin:     General: Skin is warm and dry.   Neurological:      GCS: GCS eye subscore is 1. GCS verbal subscore is 1. GCS motor subscore is 1.   Psychiatric:      Comments: Unable to assess         Significant Labs: All pertinent labs within the past 24 hours have been reviewed.    Significant Imaging: I have  reviewed all pertinent imaging results/findings within the past 24 hours.

## 2022-01-07 LAB
ALBUMIN SERPL BCP-MCNC: 2.5 G/DL (ref 3.5–5.2)
ALP SERPL-CCNC: 156 U/L (ref 55–135)
ALT SERPL W/O P-5'-P-CCNC: 26 U/L (ref 10–44)
ANION GAP SERPL CALC-SCNC: 9 MMOL/L (ref 8–16)
AST SERPL-CCNC: 16 U/L (ref 10–40)
BACTERIA UR CULT: ABNORMAL
BILIRUB SERPL-MCNC: 1.2 MG/DL (ref 0.1–1)
BUN SERPL-MCNC: 14 MG/DL (ref 6–20)
CALCIUM SERPL-MCNC: 9.4 MG/DL (ref 8.7–10.5)
CHLORIDE SERPL-SCNC: 100 MMOL/L (ref 95–110)
CO2 SERPL-SCNC: 23 MMOL/L (ref 23–29)
CREAT SERPL-MCNC: 0.4 MG/DL (ref 0.5–1.4)
EST. GFR  (AFRICAN AMERICAN): >60 ML/MIN/1.73 M^2
EST. GFR  (NON AFRICAN AMERICAN): >60 ML/MIN/1.73 M^2
GLUCOSE SERPL-MCNC: 114 MG/DL (ref 70–110)
MAGNESIUM SERPL-MCNC: 1.4 MG/DL (ref 1.6–2.6)
MAGNESIUM SERPL-MCNC: 1.9 MG/DL (ref 1.6–2.6)
PHOSPHATE SERPL-MCNC: 3.1 MG/DL (ref 2.7–4.5)
POCT GLUCOSE: 100 MG/DL (ref 70–110)
POCT GLUCOSE: 135 MG/DL (ref 70–110)
POCT GLUCOSE: 89 MG/DL (ref 70–110)
POCT GLUCOSE: 97 MG/DL (ref 70–110)
POTASSIUM SERPL-SCNC: 3.6 MMOL/L (ref 3.5–5.1)
POTASSIUM SERPL-SCNC: 4.3 MMOL/L (ref 3.5–5.1)
PROT SERPL-MCNC: 7.1 G/DL (ref 6–8.4)
SODIUM SERPL-SCNC: 132 MMOL/L (ref 136–145)
VANCOMYCIN TROUGH SERPL-MCNC: 12.5 UG/ML (ref 10–22)

## 2022-01-07 PROCEDURE — 99900026 HC AIRWAY MAINTENANCE (STAT)

## 2022-01-07 PROCEDURE — 94761 N-INVAS EAR/PLS OXIMETRY MLT: CPT

## 2022-01-07 PROCEDURE — 36415 COLL VENOUS BLD VENIPUNCTURE: CPT | Performed by: HOSPITALIST

## 2022-01-07 PROCEDURE — 36415 COLL VENOUS BLD VENIPUNCTURE: CPT | Performed by: NURSE PRACTITIONER

## 2022-01-07 PROCEDURE — 99900035 HC TECH TIME PER 15 MIN (STAT)

## 2022-01-07 PROCEDURE — 63600175 PHARM REV CODE 636 W HCPCS: Performed by: SURGERY

## 2022-01-07 PROCEDURE — 25000003 PHARM REV CODE 250: Performed by: SURGERY

## 2022-01-07 PROCEDURE — 94640 AIRWAY INHALATION TREATMENT: CPT

## 2022-01-07 PROCEDURE — 63600175 PHARM REV CODE 636 W HCPCS: Performed by: NURSE PRACTITIONER

## 2022-01-07 PROCEDURE — 99900022

## 2022-01-07 PROCEDURE — 63600175 PHARM REV CODE 636 W HCPCS: Performed by: HOSPITALIST

## 2022-01-07 PROCEDURE — 25000003 PHARM REV CODE 250: Performed by: NURSE PRACTITIONER

## 2022-01-07 PROCEDURE — 83735 ASSAY OF MAGNESIUM: CPT | Performed by: NURSE PRACTITIONER

## 2022-01-07 PROCEDURE — 27000207 HC ISOLATION

## 2022-01-07 PROCEDURE — 25000003 PHARM REV CODE 250: Performed by: HOSPITALIST

## 2022-01-07 PROCEDURE — 27000221 HC OXYGEN, UP TO 24 HOURS

## 2022-01-07 PROCEDURE — 36415 COLL VENOUS BLD VENIPUNCTURE: CPT | Performed by: INTERNAL MEDICINE

## 2022-01-07 PROCEDURE — 99233 SBSQ HOSP IP/OBS HIGH 50: CPT | Mod: ,,, | Performed by: INTERNAL MEDICINE

## 2022-01-07 PROCEDURE — 84100 ASSAY OF PHOSPHORUS: CPT | Performed by: NURSE PRACTITIONER

## 2022-01-07 PROCEDURE — 25000242 PHARM REV CODE 250 ALT 637 W/ HCPCS: Performed by: NURSE PRACTITIONER

## 2022-01-07 PROCEDURE — 83735 ASSAY OF MAGNESIUM: CPT | Mod: 91 | Performed by: INTERNAL MEDICINE

## 2022-01-07 PROCEDURE — 84132 ASSAY OF SERUM POTASSIUM: CPT | Performed by: INTERNAL MEDICINE

## 2022-01-07 PROCEDURE — 80053 COMPREHEN METABOLIC PANEL: CPT | Performed by: NURSE PRACTITIONER

## 2022-01-07 PROCEDURE — 80202 ASSAY OF VANCOMYCIN: CPT | Performed by: HOSPITALIST

## 2022-01-07 PROCEDURE — 27200966 HC CLOSED SUCTION SYSTEM

## 2022-01-07 PROCEDURE — 94003 VENT MGMT INPAT SUBQ DAY: CPT

## 2022-01-07 PROCEDURE — 99233 PR SUBSEQUENT HOSPITAL CARE,LEVL III: ICD-10-PCS | Mod: ,,, | Performed by: INTERNAL MEDICINE

## 2022-01-07 PROCEDURE — 20000000 HC ICU ROOM

## 2022-01-07 RX ADMIN — VANCOMYCIN HYDROCHLORIDE 1250 MG: 1.25 INJECTION, POWDER, LYOPHILIZED, FOR SOLUTION INTRAVENOUS at 12:01

## 2022-01-07 RX ADMIN — IPRATROPIUM BROMIDE AND ALBUTEROL SULFATE 3 ML: 2.5; .5 SOLUTION RESPIRATORY (INHALATION) at 07:01

## 2022-01-07 RX ADMIN — SODIUM CHLORIDE: 0.9 INJECTION, SOLUTION INTRAVENOUS at 05:01

## 2022-01-07 RX ADMIN — MUPIROCIN: 20 OINTMENT TOPICAL at 08:01

## 2022-01-07 RX ADMIN — PIPERACILLIN AND TAZOBACTAM 4.5 G: 4; .5 INJECTION, POWDER, LYOPHILIZED, FOR SOLUTION INTRAVENOUS; PARENTERAL at 03:01

## 2022-01-07 RX ADMIN — IPRATROPIUM BROMIDE AND ALBUTEROL SULFATE 3 ML: 2.5; .5 SOLUTION RESPIRATORY (INHALATION) at 11:01

## 2022-01-07 RX ADMIN — CHLORHEXIDINE GLUCONATE 0.12% ORAL RINSE 15 ML: 1.2 LIQUID ORAL at 08:01

## 2022-01-07 RX ADMIN — MIDODRINE HYDROCHLORIDE 10 MG: 5 TABLET ORAL at 08:01

## 2022-01-07 RX ADMIN — ENOXAPARIN SODIUM 40 MG: 40 INJECTION SUBCUTANEOUS at 05:01

## 2022-01-07 RX ADMIN — MAGNESIUM SULFATE 4 G: 2 INJECTION INTRAVENOUS at 07:01

## 2022-01-07 RX ADMIN — IPRATROPIUM BROMIDE AND ALBUTEROL SULFATE 3 ML: 2.5; .5 SOLUTION RESPIRATORY (INHALATION) at 06:01

## 2022-01-07 RX ADMIN — SODIUM CHLORIDE: 0.9 INJECTION, SOLUTION INTRAVENOUS at 06:01

## 2022-01-07 RX ADMIN — IPRATROPIUM BROMIDE AND ALBUTEROL SULFATE 3 ML: 2.5; .5 SOLUTION RESPIRATORY (INHALATION) at 03:01

## 2022-01-07 RX ADMIN — HYPROMELLOSE 2910 1 DROP: 5 SOLUTION OPHTHALMIC at 08:01

## 2022-01-07 RX ADMIN — VANCOMYCIN HYDROCHLORIDE 1500 MG: 1.5 INJECTION, POWDER, LYOPHILIZED, FOR SOLUTION INTRAVENOUS at 08:01

## 2022-01-07 RX ADMIN — SODIUM CHLORIDE: 0.9 INJECTION, SOLUTION INTRAVENOUS at 11:01

## 2022-01-07 RX ADMIN — PIPERACILLIN AND TAZOBACTAM 4.5 G: 4; .5 INJECTION, POWDER, LYOPHILIZED, FOR SOLUTION INTRAVENOUS; PARENTERAL at 05:01

## 2022-01-07 RX ADMIN — IPRATROPIUM BROMIDE AND ALBUTEROL SULFATE 3 ML: 2.5; .5 SOLUTION RESPIRATORY (INHALATION) at 12:01

## 2022-01-07 RX ADMIN — PIPERACILLIN AND TAZOBACTAM 4.5 G: 4; .5 INJECTION, POWDER, LYOPHILIZED, FOR SOLUTION INTRAVENOUS; PARENTERAL at 09:01

## 2022-01-07 RX ADMIN — FAMOTIDINE 20 MG: 10 INJECTION INTRAVENOUS at 08:01

## 2022-01-07 RX ADMIN — MORPHINE SULFATE 2 MG: 2 INJECTION, SOLUTION INTRAMUSCULAR; INTRAVENOUS at 03:01

## 2022-01-07 RX ADMIN — POTASSIUM CHLORIDE 40 MEQ: 7.46 INJECTION, SOLUTION INTRAVENOUS at 06:01

## 2022-01-07 NOTE — PLAN OF CARE
Warming blanket nelly  Problem: Inability to Wean (Mechanical Ventilation, Invasive)  Goal: Mechanical Ventilation Liberation  Outcome: Ongoing, Not Progressing     Problem: Nutrition Impairment (Mechanical Ventilation, Invasive)  Goal: Optimal Nutrition Delivery  Outcome: Ongoing, Not Progressing     Problem: Communication Impairment (Artificial Airway)  Goal: Effective Communication  Outcome: Ongoing, Not Progressing   Had vomited at 2200 .Tube feeding stopped. Suctioned out thick creamy color secretions. Resp rate up. Sat 96% . Lungs sound with decrease BS. Abdomen distended, firm decrease BS. ERNESTO Ritchie NP notified. With order for CXR in am. Continue to monitor. Safety/ fall prevention. Warming blanket reapplied at 0345 for temp of 96.6 rectal. Pt did calm down. . RR 25. Sat 98%.   Had liquid brown pasty stool large amount. Pt's peg tube connected to low int. Wall suction.  Potassium and magnesium replacement started.

## 2022-01-07 NOTE — ASSESSMENT & PLAN NOTE
Chronic, stable.  Latest blood pressure and vitals reviewed-   Temp:  [96.08 °F (35.6 °C)-98.24 °F (36.8 °C)]   Pulse:  []   Resp:  [10-75]   BP: (113-160)/(62-98)   SpO2:  [94 %-99 %] .   Home meds for hypertension were reviewed and noted below.   No chronic antihypertensives noted. Review of home meds reveals midodrine which has been reordered.      Will utilize p.r.n. blood pressure medication only if patient's blood pressure greater than  180/110 and she develops symptoms such as worsening chest pain or shortness of breath.

## 2022-01-07 NOTE — CARE UPDATE
01/07/22 0723   Patient Assessment/Suction   Level of Consciousness (AVPU) alert   Respiratory Effort Normal;Unlabored   Expansion/Accessory Muscles/Retractions no use of accessory muscles   All Lung Fields Breath Sounds coarse;diminished   Rhythm/Pattern, Respiratory assisted mechanically   Cough Frequency with stimulation   Cough Type assisted   Suction Method tracheal;oral   $ Suction Charges Inline Suction Procedure Stat Charge   Secretions Amount moderate   Secretions Color tan   Secretions Characteristics thick   PRE-TX-O2   O2 Device (Oxygen Therapy) ventilator   $ Is the patient on Low Flow Oxygen? Yes   Oxygen Concentration (%) 35   SpO2 95 %   Pulse Oximetry Type Continuous   $ Pulse Oximetry - Multiple Charge Pulse Oximetry - Multiple   Pulse 103   Resp (!) 21   Temp 96.26 °F (35.7 °C)   ETCO2   $ ETCO2 Usage Currently wearing   ETCO2 (mmHg) 25 mmHg   ETCO2 Device Type Bedside Monitor   Aerosol Therapy   $ Aerosol Therapy Charges Aerosol Treatment   Respiratory Treatment Status (SVN) given   Treatment Route (SVN) in-line   Patient Position (SVN) HOB elevated   Signs of Intolerance (SVN) none   Breath Sounds Post-Respiratory Treatment   Throughout All Fields Post-Treatment All Fields   Throughout All Fields Post-Treatment no change   Post-treatment Heart Rate (beats/min) 100   Post-treatment Resp Rate (breaths/min) 19   Skin Integrity   $ Wound Care Tech Time 15 min   Area Observed Neck under tracheostomy   Skin Appearance without discoloration   Barrier used? Other (see comments)  (Drain sponge)        Surgical Airway Portex Cuffed   No placement date or time found.   Present Prior to Hospital Arrival?: Yes  Type: Tracheostomy  Brand: Portex  Airway Device Size: 8.0  Style: Cuffed   Cuff Pressure 32 cm H2O   Cuff Inflation? Inflated   Status Secured   Site Assessment Clean;Dry   Airway Safety   Ambu bag with the patient? Yes, Adult Ambu   Is mask with the patient? Yes, Adult Mask   Suction set is at  the bedside? Yes   Vent Select   Charged w/in last 24h YES   Preset Conventional Ventilator Settings   Vent Type NKV-550   Vent Mode A/CMV-VC   Humidity HME   Set Rate 24 BPM   Vt Set 350 mL   PEEP/CPAP 5.3 cmH20   Insp Time 1 Sec(s)   I-Trigger Type  Flow   Trigger Sensitivity Flow/I-Trigger 2.5 L/min   Patient Ventilator Parameters   Resp Rate Total 24 br/min   Peak Airway Pressure 22.3 cmH2O   Exhaled Vt 350 mL   Total Ve 8.37 mL   I:E Ratio Measured 1:1.5   Inspired Tidal Volume (VTI) 369 mL   Conventional Ventilator Alarms   Alarms On Y   Ve High Alarm 20 L/min   Vt High Alarm 15 mL   Vt Low Alarm 4 mL   Apnea Rate 15   Apnea Volume (mL) 450 mL   Ready to Wean/Extubation Screen   FIO2<=50 (chart decimal) 0.35   MV<16L (chart vol.) 8.37   PEEP <=8 (chart #) 5.3   Ready to Wean Parameters   F/VT Ratio<105 (RSBI) (!) 60

## 2022-01-07 NOTE — SUBJECTIVE & OBJECTIVE
Interval History: Pulmonology following. Sputum culture with few GNR. Blood culture with CoNS- contaminant.  Urine with presumptive proteus.  Tube feeding held this morning due to abdominal distension.    Review of Systems   Unable to perform ROS: Patient nonverbal     Objective:     Vital Signs (Most Recent):  Temp: 96.26 °F (35.7 °C) (01/07/22 0723)  Pulse: 103 (01/07/22 0723)  Resp: (!) 21 (01/07/22 0723)  BP: 113/65 (01/07/22 0600)  SpO2: 95 % (01/07/22 0723) Vital Signs (24h Range):  Temp:  [96.08 °F (35.6 °C)-98.24 °F (36.8 °C)] 96.26 °F (35.7 °C)  Pulse:  [] 103  Resp:  [10-75] 21  SpO2:  [94 %-99 %] 95 %  BP: (113-160)/(62-98) 113/65     Weight: 69 kg (152 lb 1.9 oz)  Body mass index is 29.71 kg/m².    Intake/Output Summary (Last 24 hours) at 1/7/2022 1038  Last data filed at 1/7/2022 0956  Gross per 24 hour   Intake 3291.7 ml   Output 3850 ml   Net -558.3 ml      Physical Exam  Vitals and nursing note reviewed.   Constitutional:       General: She is not in acute distress.     Appearance: She is well-developed. She is ill-appearing.      Comments: Chronic trach/vent dependent   HENT:      Head: Normocephalic and atraumatic.      Mouth/Throat:      Mouth: Mucous membranes are dry.   Cardiovascular:      Rate and Rhythm: Normal rate and regular rhythm.   Pulmonary:      Effort: Pulmonary effort is normal. No respiratory distress.      Breath sounds: Rhonchi present.   Abdominal:      General: Bowel sounds are normal.      Palpations: Abdomen is soft.      Tenderness: There is no abdominal tenderness.      Comments: g tube in place   Musculoskeletal:      Cervical back: Normal range of motion and neck supple.      Comments: Contractures to hands   Skin:     General: Skin is warm and dry.   Neurological:      GCS: GCS eye subscore is 1. GCS verbal subscore is 1. GCS motor subscore is 1.   Psychiatric:      Comments: Unable to assess         Significant Labs:  Lab Results   Component Value Date    WBC 4.91  01/06/2022    HGB 8.2 (L) 01/06/2022    HCT 26.9 (L) 01/06/2022    MCV 92 01/06/2022     01/06/2022       CMP  Sodium   Date Value Ref Range Status   01/07/2022 132 (L) 136 - 145 mmol/L Final     Potassium   Date Value Ref Range Status   01/07/2022 3.6 3.5 - 5.1 mmol/L Final     Chloride   Date Value Ref Range Status   01/07/2022 100 95 - 110 mmol/L Final     CO2   Date Value Ref Range Status   01/07/2022 23 23 - 29 mmol/L Final     Glucose   Date Value Ref Range Status   01/07/2022 114 (H) 70 - 110 mg/dL Final     BUN   Date Value Ref Range Status   01/07/2022 14 6 - 20 mg/dL Final     Creatinine   Date Value Ref Range Status   01/07/2022 0.4 (L) 0.5 - 1.4 mg/dL Final     Calcium   Date Value Ref Range Status   01/07/2022 9.4 8.7 - 10.5 mg/dL Final     Total Protein   Date Value Ref Range Status   01/07/2022 7.1 6.0 - 8.4 g/dL Final     Albumin   Date Value Ref Range Status   01/07/2022 2.5 (L) 3.5 - 5.2 g/dL Final     Total Bilirubin   Date Value Ref Range Status   01/07/2022 1.2 (H) 0.1 - 1.0 mg/dL Final     Comment:     For infants and newborns, interpretation of results should be based  on gestational age, weight and in agreement with clinical  observations.    Premature Infant recommended reference ranges:  Up to 24 hours.............<8.0 mg/dL  Up to 48 hours............<12.0 mg/dL  3-5 days..................<15.0 mg/dL  6-29 days.................<15.0 mg/dL       Alkaline Phosphatase   Date Value Ref Range Status   01/07/2022 156 (H) 55 - 135 U/L Final     AST   Date Value Ref Range Status   01/07/2022 16 10 - 40 U/L Final     ALT   Date Value Ref Range Status   01/07/2022 26 10 - 44 U/L Final     Anion Gap   Date Value Ref Range Status   01/07/2022 9 8 - 16 mmol/L Final     eGFR if    Date Value Ref Range Status   01/07/2022 >60 >60 mL/min/1.73 m^2 Final     eGFR if non    Date Value Ref Range Status   01/07/2022 >60 >60 mL/min/1.73 m^2 Final     Comment:      Calculation used to obtain the estimated glomerular filtration  rate (eGFR) is the CKD-EPI equation.        Microbiology Results (last 7 days)     Procedure Component Value Units Date/Time    Culture, Respiratory with Gram Stain [[id number]]  (Abnormal) Collected: 01/05/22 2009    Order Status: Completed Specimen: Respiratory from Endotracheal Aspirate Updated: 01/07/22 1035     Respiratory Culture GRAM NEGATIVE CHRISTINA  Many  Identification and susceptibility pending       Gram Stain (Respiratory) <10 epithelial cells per low power field.     Gram Stain (Respiratory) Rare WBC's     Gram Stain (Respiratory) Few Gram negative rods     Gram Stain (Respiratory) Rare yeast    Blood culture x two cultures. Draw prior to antibiotics. [[id number]] Collected: 01/03/22 1950    Order Status: Completed Specimen: Blood Updated: 01/07/22 0612     Blood Culture, Routine No Growth to date      No Growth to date      No Growth to date      No Growth to date    Narrative:      Aerobic and anaerobic    Urine culture [[id number]]  (Abnormal)  (Susceptibility) Collected: 01/03/22 1943    Order Status: Completed Specimen: Urine Updated: 01/06/22 1535     Urine Culture, Routine PROTEUS MIRABILIS  >100,000 cfu/ml  Identification and susceptibility pending      Narrative:      Specimen Source->Urine    Blood culture x two cultures. Draw prior to antibiotics. [[id number]]  (Abnormal) Collected: 01/03/22 1950    Order Status: Completed Specimen: Blood Updated: 01/06/22 1313     Blood Culture, Routine Gram stain peds bottle: Gram positive cocci in clusters resembling Staph      Results called to and read back by:Yvonne Gamez RN 01/04/2022  23:08      COAGULASE-NEGATIVE STAPHYLOCOCCUS SPECIES  Organism is a probable contaminant      Narrative:      Aerobic and anaerobic          Significant Imaging:  CXR:  Worsening airspace opacities in bilateral lungs, suspicious for multifocal pneumonia.    CXR: Resolving intrapulmonary infiltrates with a small  amount of consolidation remaining at the right lung base. Tracheostomy tube in position.    KUB: No acute abdominal process.

## 2022-01-07 NOTE — PLAN OF CARE
Problem: Adult Inpatient Plan of Care  Goal: Plan of Care Review  Outcome: Ongoing, Not Progressing     Problem: Infection Progression (Sepsis/Septic Shock)  Goal: Absence of Infection Signs and Symptoms  Outcome: Ongoing, Not Progressing     Problem: Nutrition Impaired (Sepsis/Septic Shock)  Goal: Optimal Nutrition Intake  Outcome: Ongoing, Not Progressing     POC reviewed with the patient; ANGELA understanding due to clinical condition. Patient remains ventilated/trach at this time. Oral care performed with CHG mouthwash. Afternoon dose of midodrine held due to HTN. PEG in place--abdomin distended and firm, KUB confirmed no acute processes & [place] ordered to restart TF and monitor for intolerance. TF restarted at 1550 at 10cc/hr. [place] remains intact--urine output appears to have decreased compared to last 48 hours and urine now dark yellow. BM x1 this shift. Beth martínez utilized part of shift to maintain normothermia. Bed locked in lowest position with bed alarm set, call light within reach. Safety precautions maintained.

## 2022-01-07 NOTE — ASSESSMENT & PLAN NOTE
"This patient does have evidence of infective focus  My overall impression is sepsis. Vital signs were reviewed and noted in progress note.  Antibiotics given-   Antibiotics (From admission, onward)            Start     Stop Route Frequency Ordered    01/06/22 0630  vancomycin 1.25 g in dextrose 5% 250 mL IVPB (ready to mix)         -- IV Every 18 hours 01/06/22 0611    01/04/22 0900  mupirocin 2 % ointment         01/09 0859 Nasl 2 times daily 01/04/22 0142    01/04/22 0400  piperacillin-tazobactam 4.5 g in dextrose 5 % 100 mL IVPB (ready to mix system)         -- IV Every 8 hours (non-standard times) 01/03/22 2141    01/03/22 2241  vancomycin - pharmacy to dose  (vancomycin IVPB)        "And" Linked Group Details    -- IV pharmacy to manage frequency 01/03/22 2141        Cultures were taken-   Microbiology Results (last 7 days)     Procedure Component Value Units Date/Time    Culture, Respiratory with Gram Stain [[id number]]  (Abnormal) Collected: 01/05/22 2009    Order Status: Completed Specimen: Respiratory from Endotracheal Aspirate Updated: 01/07/22 1035     Respiratory Culture GRAM NEGATIVE CHRISTINA  Many  Identification and susceptibility pending       Gram Stain (Respiratory) <10 epithelial cells per low power field.     Gram Stain (Respiratory) Rare WBC's     Gram Stain (Respiratory) Few Gram negative rods     Gram Stain (Respiratory) Rare yeast    Blood culture x two cultures. Draw prior to antibiotics. [[id number]] Collected: 01/03/22 1950    Order Status: Completed Specimen: Blood Updated: 01/07/22 0612     Blood Culture, Routine No Growth to date      No Growth to date      No Growth to date      No Growth to date    Narrative:      Aerobic and anaerobic    Urine culture [[id number]]  (Abnormal)  (Susceptibility) Collected: 01/03/22 1943    Order Status: Completed Specimen: Urine Updated: 01/06/22 1535     Urine Culture, Routine PROTEUS MIRABILIS  >100,000 cfu/ml  Identification and susceptibility pending      " Narrative:      Specimen Source->Urine    Blood culture x two cultures. Draw prior to antibiotics. [[id number]]  (Abnormal) Collected: 01/03/22 1950    Order Status: Completed Specimen: Blood Updated: 01/06/22 1313     Blood Culture, Routine Gram stain peds bottle: Gram positive cocci in clusters resembling Staph      Results called to and read back by:Yvonne Gamez RN 01/04/2022  23:08      COAGULASE-NEGATIVE STAPHYLOCOCCUS SPECIES  Organism is a probable contaminant      Narrative:      Aerobic and anaerobic        Latest lactate reviewed, they are-  No results for input(s): LACTATE in the last 72 hours.    Organ dysfunction indicated by tachycardia, tachypnea  Source- respiratory, urine      Source control Achieved by- IV abx

## 2022-01-07 NOTE — PLAN OF CARE
Problem: Noninvasive Ventilation Acute  Goal: Effective Unassisted Ventilation and Oxygenation  Outcome: Ongoing, Not Progressing     Problem: Adult Inpatient Plan of Care  Goal: Plan of Care Review  Outcome: Ongoing, Not Progressing     Problem: Infection Progression (Sepsis/Septic Shock)  Goal: Absence of Infection Signs and Symptoms  Outcome: Ongoing, Not Progressing     POC reviewed with the patient; ANGELA understanding due to clinical condition. Patient remains ventilated/trach at this time. Oral care performed with CHG mouthwash. Afternoon dose of midodrine held due to --Megan CHEN aware. PEG in place--see prior note regarding episode of emesis. TF currently at goal and appears to be tolerating well. [place] remains intact--AUOP. No BM this shift. Bed locked in lowest position with bed alarm set, call light within reach. Safety precautions maintained.

## 2022-01-08 LAB
ALBUMIN SERPL BCP-MCNC: 2.1 G/DL (ref 3.5–5.2)
ALP SERPL-CCNC: 143 U/L (ref 55–135)
ALT SERPL W/O P-5'-P-CCNC: 20 U/L (ref 10–44)
ANION GAP SERPL CALC-SCNC: 11 MMOL/L (ref 8–16)
AST SERPL-CCNC: 14 U/L (ref 10–40)
BILIRUB SERPL-MCNC: 1.6 MG/DL (ref 0.1–1)
BUN SERPL-MCNC: 7 MG/DL (ref 6–20)
CALCIUM SERPL-MCNC: 8.5 MG/DL (ref 8.7–10.5)
CHLORIDE SERPL-SCNC: 100 MMOL/L (ref 95–110)
CO2 SERPL-SCNC: 19 MMOL/L (ref 23–29)
CREAT SERPL-MCNC: 0.4 MG/DL (ref 0.5–1.4)
EST. GFR  (AFRICAN AMERICAN): >60 ML/MIN/1.73 M^2
EST. GFR  (NON AFRICAN AMERICAN): >60 ML/MIN/1.73 M^2
GLUCOSE SERPL-MCNC: 77 MG/DL (ref 70–110)
MAGNESIUM SERPL-MCNC: 1.8 MG/DL (ref 1.6–2.6)
MAGNESIUM SERPL-MCNC: 2.2 MG/DL (ref 1.6–2.6)
PHOSPHATE SERPL-MCNC: 3 MG/DL (ref 2.7–4.5)
POCT GLUCOSE: 113 MG/DL (ref 70–110)
POCT GLUCOSE: 116 MG/DL (ref 70–110)
POCT GLUCOSE: 88 MG/DL (ref 70–110)
POCT GLUCOSE: 94 MG/DL (ref 70–110)
POTASSIUM SERPL-SCNC: 3.6 MMOL/L (ref 3.5–5.1)
POTASSIUM SERPL-SCNC: 4.5 MMOL/L (ref 3.5–5.1)
PROT SERPL-MCNC: 6.1 G/DL (ref 6–8.4)
SODIUM SERPL-SCNC: 130 MMOL/L (ref 136–145)

## 2022-01-08 PROCEDURE — 25000003 PHARM REV CODE 250: Performed by: NURSE PRACTITIONER

## 2022-01-08 PROCEDURE — 99900035 HC TECH TIME PER 15 MIN (STAT)

## 2022-01-08 PROCEDURE — 25000003 PHARM REV CODE 250: Performed by: SURGERY

## 2022-01-08 PROCEDURE — 94640 AIRWAY INHALATION TREATMENT: CPT

## 2022-01-08 PROCEDURE — 83735 ASSAY OF MAGNESIUM: CPT | Performed by: NURSE PRACTITIONER

## 2022-01-08 PROCEDURE — 36415 COLL VENOUS BLD VENIPUNCTURE: CPT | Performed by: NURSE PRACTITIONER

## 2022-01-08 PROCEDURE — 80053 COMPREHEN METABOLIC PANEL: CPT | Performed by: NURSE PRACTITIONER

## 2022-01-08 PROCEDURE — 99233 PR SUBSEQUENT HOSPITAL CARE,LEVL III: ICD-10-PCS | Mod: ,,, | Performed by: INTERNAL MEDICINE

## 2022-01-08 PROCEDURE — 99233 SBSQ HOSP IP/OBS HIGH 50: CPT | Mod: ,,, | Performed by: INTERNAL MEDICINE

## 2022-01-08 PROCEDURE — 94003 VENT MGMT INPAT SUBQ DAY: CPT

## 2022-01-08 PROCEDURE — 27000221 HC OXYGEN, UP TO 24 HOURS

## 2022-01-08 PROCEDURE — 36415 COLL VENOUS BLD VENIPUNCTURE: CPT | Performed by: INTERNAL MEDICINE

## 2022-01-08 PROCEDURE — 99900026 HC AIRWAY MAINTENANCE (STAT)

## 2022-01-08 PROCEDURE — 63600175 PHARM REV CODE 636 W HCPCS: Performed by: NURSE PRACTITIONER

## 2022-01-08 PROCEDURE — 84132 ASSAY OF SERUM POTASSIUM: CPT | Performed by: INTERNAL MEDICINE

## 2022-01-08 PROCEDURE — 83735 ASSAY OF MAGNESIUM: CPT | Mod: 91 | Performed by: INTERNAL MEDICINE

## 2022-01-08 PROCEDURE — 94761 N-INVAS EAR/PLS OXIMETRY MLT: CPT

## 2022-01-08 PROCEDURE — 27000207 HC ISOLATION

## 2022-01-08 PROCEDURE — 84100 ASSAY OF PHOSPHORUS: CPT | Performed by: NURSE PRACTITIONER

## 2022-01-08 PROCEDURE — 20000000 HC ICU ROOM

## 2022-01-08 PROCEDURE — 25000003 PHARM REV CODE 250: Performed by: HOSPITALIST

## 2022-01-08 PROCEDURE — 25000242 PHARM REV CODE 250 ALT 637 W/ HCPCS: Performed by: NURSE PRACTITIONER

## 2022-01-08 PROCEDURE — 63600175 PHARM REV CODE 636 W HCPCS: Performed by: HOSPITALIST

## 2022-01-08 RX ADMIN — SODIUM CHLORIDE: 0.9 INJECTION, SOLUTION INTRAVENOUS at 09:01

## 2022-01-08 RX ADMIN — MUPIROCIN: 20 OINTMENT TOPICAL at 09:01

## 2022-01-08 RX ADMIN — FAMOTIDINE 20 MG: 10 INJECTION INTRAVENOUS at 09:01

## 2022-01-08 RX ADMIN — MAGNESIUM SULFATE 2 G: 2 INJECTION INTRAVENOUS at 05:01

## 2022-01-08 RX ADMIN — PIPERACILLIN AND TAZOBACTAM 4.5 G: 4; .5 INJECTION, POWDER, LYOPHILIZED, FOR SOLUTION INTRAVENOUS; PARENTERAL at 02:01

## 2022-01-08 RX ADMIN — POTASSIUM BICARBONATE 50 MEQ: 977.5 TABLET, EFFERVESCENT ORAL at 05:01

## 2022-01-08 RX ADMIN — IPRATROPIUM BROMIDE AND ALBUTEROL SULFATE 3 ML: 2.5; .5 SOLUTION RESPIRATORY (INHALATION) at 07:01

## 2022-01-08 RX ADMIN — IPRATROPIUM BROMIDE AND ALBUTEROL SULFATE 3 ML: 2.5; .5 SOLUTION RESPIRATORY (INHALATION) at 03:01

## 2022-01-08 RX ADMIN — SODIUM CHLORIDE: 0.9 INJECTION, SOLUTION INTRAVENOUS at 02:01

## 2022-01-08 RX ADMIN — CHLORHEXIDINE GLUCONATE 0.12% ORAL RINSE 15 ML: 1.2 LIQUID ORAL at 09:01

## 2022-01-08 RX ADMIN — IPRATROPIUM BROMIDE AND ALBUTEROL SULFATE 3 ML: 2.5; .5 SOLUTION RESPIRATORY (INHALATION) at 11:01

## 2022-01-08 RX ADMIN — ENOXAPARIN SODIUM 40 MG: 40 INJECTION SUBCUTANEOUS at 04:01

## 2022-01-08 RX ADMIN — SODIUM CHLORIDE: 0.9 INJECTION, SOLUTION INTRAVENOUS at 11:01

## 2022-01-08 RX ADMIN — VANCOMYCIN HYDROCHLORIDE 1500 MG: 1.5 INJECTION, POWDER, LYOPHILIZED, FOR SOLUTION INTRAVENOUS at 01:01

## 2022-01-08 RX ADMIN — MIDODRINE HYDROCHLORIDE 10 MG: 5 TABLET ORAL at 09:01

## 2022-01-08 RX ADMIN — PIPERACILLIN AND TAZOBACTAM 4.5 G: 4; .5 INJECTION, POWDER, LYOPHILIZED, FOR SOLUTION INTRAVENOUS; PARENTERAL at 09:01

## 2022-01-08 RX ADMIN — PIPERACILLIN AND TAZOBACTAM 4.5 G: 4; .5 INJECTION, POWDER, LYOPHILIZED, FOR SOLUTION INTRAVENOUS; PARENTERAL at 05:01

## 2022-01-08 RX ADMIN — HYPROMELLOSE 2910 1 DROP: 5 SOLUTION OPHTHALMIC at 09:01

## 2022-01-08 RX ADMIN — MIDODRINE HYDROCHLORIDE 10 MG: 5 TABLET ORAL at 02:01

## 2022-01-08 NOTE — PROGRESS NOTES
[facility] Hospital Medicine  Progress Note    Patient Name: Genna Felix  MRN: [id number]  Patient Class: IP- Inpatient   Admission Date: 1/3/2022  Length of Stay: 5 days  Attending Physician: Geeta Siddiqi MD  Primary Care Provider: Primary Doctor No        Subjective:     Principal Problem:Sepsis        HPI:  Genna Felix is a 61 year old female with a past medical history of anoxic brain injury s/p G tube and tracheostomy, PE, HTN, GERD who presented from [facility] for further evaluation of tachycardia.  She was discharged from this facility on 12/27/21 with a diagnosis of pneumonia with rx for bactrim.  In the ED she is found to be tachycardic with WBC 14K, lactic acid 2.6 and evidence of UTI.  CXR reveals worsening multifocal pneumonia.  Pt is nonverbal and cannot contribute to history of present illness, therefore further information is limited.  While in the ED, IV zosyn was initiated and she was give an IVF bolus.  She will be admitted to the service of hospital medicine for continued monitoring and treatment.      Overview/Hospital Course:  61-year-old female with past medical history of anoxic brain injury, PE, hypertension, GERD admitted to the hospital medicine service for UTI and worsening multifocal pneumonia.  She was placed on broad-spectrum antibiotics.  Pulmonologist was consulted.  Urine culture grew presumptive Proteus.  Sputum culture currently with few Gram-negative rods.      Interval History: Pulmonology following. Sputum culture grew Pseudomonas and Serratia species. Blood culture with CoNS- contaminant.  Urine with presumptive proteus.  Currently receiving PEG tube feeding at 20 cc/hour.    Review of Systems   Unable to perform ROS: Patient nonverbal     Objective:     Vital Signs (Most Recent):  Temp: 96.8 °F (36 °C) (01/08/22 0800)  Pulse: 103 (01/08/22 0800)  Resp: (!) 36 (01/08/22 0800)  BP: 135/63 (01/08/22 0730)  SpO2: 96 % (01/08/22 0800) Vital Signs (24h  Range):  Temp:  [96.08 °F (35.6 °C)-99.32 °F (37.4 °C)] 96.8 °F (36 °C)  Pulse:  [] 103  Resp:  [19-36] 36  SpO2:  [93 %-100 %] 96 %  BP: ()/(53-96) 135/63     Weight: 70.9 kg (156 lb 4.9 oz)  Body mass index is 30.53 kg/m².    Intake/Output Summary (Last 24 hours) at 1/8/2022 1019  Last data filed at 1/8/2022 0730  Gross per 24 hour   Intake 7051.95 ml   Output 1625 ml   Net 5426.95 ml      Physical Exam  Vitals and nursing note reviewed.   Constitutional:       General: She is not in acute distress.     Appearance: She is well-developed. She is ill-appearing.      Comments: Chronic trach/vent dependent   HENT:      Head: Normocephalic and atraumatic.      Mouth/Throat:      Mouth: Mucous membranes are dry.   Cardiovascular:      Rate and Rhythm: Normal rate and regular rhythm.   Pulmonary:      Effort: Pulmonary effort is normal. No respiratory distress.      Breath sounds: Rhonchi present.   Abdominal:      General: Bowel sounds are normal.      Palpations: Abdomen is soft.      Tenderness: There is no abdominal tenderness.      Comments: g tube in place   Musculoskeletal:      Cervical back: Normal range of motion and neck supple.      Comments: Contractures to hands   Skin:     General: Skin is warm and dry.   Neurological:      GCS: GCS eye subscore is 1. GCS verbal subscore is 1. GCS motor subscore is 1.   Psychiatric:      Comments: Unable to assess         Significant Labs:  Lab Results   Component Value Date    WBC 4.91 01/06/2022    HGB 8.2 (L) 01/06/2022    HCT 26.9 (L) 01/06/2022    MCV 92 01/06/2022     01/06/2022       CMP  Sodium   Date Value Ref Range Status   01/08/2022 130 (L) 136 - 145 mmol/L Final     Potassium   Date Value Ref Range Status   01/08/2022 3.6 3.5 - 5.1 mmol/L Final     Chloride   Date Value Ref Range Status   01/08/2022 100 95 - 110 mmol/L Final     CO2   Date Value Ref Range Status   01/08/2022 19 (L) 23 - 29 mmol/L Final     Glucose   Date Value Ref Range  Status   01/08/2022 77 70 - 110 mg/dL Final     BUN   Date Value Ref Range Status   01/08/2022 7 6 - 20 mg/dL Final     Creatinine   Date Value Ref Range Status   01/08/2022 0.4 (L) 0.5 - 1.4 mg/dL Final     Calcium   Date Value Ref Range Status   01/08/2022 8.5 (L) 8.7 - 10.5 mg/dL Final     Total Protein   Date Value Ref Range Status   01/08/2022 6.1 6.0 - 8.4 g/dL Final     Albumin   Date Value Ref Range Status   01/08/2022 2.1 (L) 3.5 - 5.2 g/dL Final     Total Bilirubin   Date Value Ref Range Status   01/08/2022 1.6 (H) 0.1 - 1.0 mg/dL Final     Comment:     For infants and newborns, interpretation of results should be based  on gestational age, weight and in agreement with clinical  observations.    Premature Infant recommended reference ranges:  Up to 24 hours.............<8.0 mg/dL  Up to 48 hours............<12.0 mg/dL  3-5 days..................<15.0 mg/dL  6-29 days.................<15.0 mg/dL       Alkaline Phosphatase   Date Value Ref Range Status   01/08/2022 143 (H) 55 - 135 U/L Final     AST   Date Value Ref Range Status   01/08/2022 14 10 - 40 U/L Final     ALT   Date Value Ref Range Status   01/08/2022 20 10 - 44 U/L Final     Anion Gap   Date Value Ref Range Status   01/08/2022 11 8 - 16 mmol/L Final     eGFR if    Date Value Ref Range Status   01/08/2022 >60 >60 mL/min/1.73 m^2 Final     eGFR if non    Date Value Ref Range Status   01/08/2022 >60 >60 mL/min/1.73 m^2 Final     Comment:     Calculation used to obtain the estimated glomerular filtration  rate (eGFR) is the CKD-EPI equation.        Microbiology Results (last 7 days)     Procedure Component Value Units Date/Time    Culture, Respiratory with Gram Stain [[id number]]  (Abnormal)  (Susceptibility) Collected: 01/05/22 2009    Order Status: Completed Specimen: Respiratory from Endotracheal Aspirate Updated: 01/08/22 0910     Respiratory Culture No S aureus isolated.      SERRATIA MARCESCENS  Many         PRESUMPTIVE PSEUDOMONAS SPECIES  Moderate  Identification and susceptibility pending       Gram Stain (Respiratory) <10 epithelial cells per low power field.     Gram Stain (Respiratory) Rare WBC's     Gram Stain (Respiratory) Few Gram negative rods     Gram Stain (Respiratory) Rare yeast    Blood culture x two cultures. Draw prior to antibiotics. [[id number]] Collected: 01/03/22 1950    Order Status: Completed Specimen: Blood Updated: 01/08/22 0612     Blood Culture, Routine No Growth to date      No Growth to date      No Growth to date      No Growth to date      No Growth to date    Narrative:      Aerobic and anaerobic    Urine culture [[id number]]  (Abnormal)  (Susceptibility) Collected: 01/03/22 1943    Order Status: Completed Specimen: Urine Updated: 01/07/22 1553     Urine Culture, Routine PROTEUS MIRABILIS  >100,000 cfu/ml      Narrative:      Specimen Source->Urine    Blood culture x two cultures. Draw prior to antibiotics. [[id number]]  (Abnormal) Collected: 01/03/22 1950    Order Status: Completed Specimen: Blood Updated: 01/06/22 1313     Blood Culture, Routine Gram stain peds bottle: Gram positive cocci in clusters resembling Staph      Results called to and read back by:Yvonne Gamez RN 01/04/2022  23:08      COAGULASE-NEGATIVE STAPHYLOCOCCUS SPECIES  Organism is a probable contaminant      Narrative:      Aerobic and anaerobic          Significant Imaging:  CXR:  Worsening airspace opacities in bilateral lungs, suspicious for multifocal pneumonia.    CXR: Resolving intrapulmonary infiltrates with a small amount of consolidation remaining at the right lung base. Tracheostomy tube in position.    KUB: No acute abdominal process.    CXR: Resolving intrapulmonary infiltrates with a small amount of consolidation remaining at the right lung base. Tracheostomy tube in position.        Assessment/Plan:      * Sepsis  This patient does have evidence of infective focus  My overall impression is sepsis. Vital  "signs were reviewed and noted in progress note.  Antibiotics given-   Antibiotics (From admission, onward)            Start     Stop Route Frequency Ordered    01/06/22 0630  vancomycin 1.25 g in dextrose 5% 250 mL IVPB (ready to mix)         -- IV Every 18 hours 01/06/22 0611    01/04/22 0900  mupirocin 2 % ointment         01/09 0859 Nasl 2 times daily 01/04/22 0142    01/04/22 0400  piperacillin-tazobactam 4.5 g in dextrose 5 % 100 mL IVPB (ready to mix system)         -- IV Every 8 hours (non-standard times) 01/03/22 2141    01/03/22 2241  vancomycin - pharmacy to dose  (vancomycin IVPB)        "And" Linked Group Details    -- IV pharmacy to manage frequency 01/03/22 2141        Cultures were taken-   Microbiology Results (last 7 days)     Procedure Component Value Units Date/Time    Culture, Respiratory with Gram Stain [[id number]]  (Abnormal) Collected: 01/05/22 2009    Order Status: Completed Specimen: Respiratory from Endotracheal Aspirate Updated: 01/07/22 1035     Respiratory Culture GRAM NEGATIVE CHRISTINA  Many  Identification and susceptibility pending       Gram Stain (Respiratory) <10 epithelial cells per low power field.     Gram Stain (Respiratory) Rare WBC's     Gram Stain (Respiratory) Few Gram negative rods     Gram Stain (Respiratory) Rare yeast    Blood culture x two cultures. Draw prior to antibiotics. [[id number]] Collected: 01/03/22 1950    Order Status: Completed Specimen: Blood Updated: 01/07/22 0612     Blood Culture, Routine No Growth to date      No Growth to date      No Growth to date      No Growth to date    Narrative:      Aerobic and anaerobic    Urine culture [[id number]]  (Abnormal)  (Susceptibility) Collected: 01/03/22 1943    Order Status: Completed Specimen: Urine Updated: 01/06/22 1535     Urine Culture, Routine PROTEUS MIRABILIS  >100,000 cfu/ml  Identification and susceptibility pending      Narrative:      Specimen Source->Urine    Blood culture x two cultures. Draw prior to " antibiotics. [[id number]]  (Abnormal) Collected: 01/03/22 1950    Order Status: Completed Specimen: Blood Updated: 01/06/22 1313     Blood Culture, Routine Gram stain peds bottle: Gram positive cocci in clusters resembling Staph      Results called to and read back by:Yvonne Gamez RN 01/04/2022  23:08      COAGULASE-NEGATIVE STAPHYLOCOCCUS SPECIES  Organism is a probable contaminant      Narrative:      Aerobic and anaerobic        Latest lactate reviewed, they are-  No results for input(s): LACTATE in the last 72 hours.    Organ dysfunction indicated by tachycardia, tachypnea  Source- respiratory, urine      Source control Achieved by- IV abx      Hyponatremia  Follow BMP.       UTI (urinary tract infection)  IV zosyn; follow culture        Pressure injury of left buttock, stage 4  Consult wound care  Turn q2h        Ventilator associated pneumonia  Failed OP abx  Pt vent dependent-consult pulmonology for vent management  IV zosyn/IV vanc  Blood and sputum cx  Nebulizer treatments        Gastroesophageal reflux disease  Chronic; utilize pepcid acutely.      Essential hypertension  Chronic, stable.  Latest blood pressure and vitals reviewed-   Temp:  [96.08 °F (35.6 °C)-98.24 °F (36.8 °C)]   Pulse:  []   Resp:  [10-75]   BP: (113-160)/(62-98)   SpO2:  [94 %-99 %] .   Home meds for hypertension were reviewed and noted below.   No chronic antihypertensives noted. Review of home meds reveals midodrine which has been reordered.      Will utilize p.r.n. blood pressure medication only if patient's blood pressure greater than  180/110 and she develops symptoms such as worsening chest pain or shortness of breath.        Malnutrition of moderate degree  Nutrition consult.      Chronic respiratory failure with hypoxia  Patient with Hypoxic Respiratory failure which is Chronic.  she is vent dependent. Supplemental oxygen was provided and noted- Vent Mode: A/CMV-VC  Oxygen Concentration (%):  [35] 35  Resp Rate Total:   [24 br/min-43 br/min] 24 br/min  Vt Set:  [350 mL] 350 mL  PEEP/CPAP:  [4.3 cmH20-8.2 cmH20] 5.3 cmH20.   Signs/symptoms of respiratory failure include- tachypnea and increased work of breathing. Contributing diagnoses includes - Aspiration and Pneumonia Labs and images were reviewed. Patient Has not had a recent ABG. Will treat underlying causes.    PEG (percutaneous endoscopic gastrostomy) status  Noted; care per nursing.  TF per nutrition recs.  Slowly advance PEG tube feeding.        Tracheostomy dependence  Noted; care per nursing      Anoxic brain injury  Noted.  Turn q2h      Anemia of chronic disease  Patient's anemia is currently controlled. S/p 0 units of PRBCs.  Current CBC reviewed-   Lab Results   Component Value Date    HGB 8.2 (L) 01/06/2022    HCT 26.9 (L) 01/06/2022    MCV 92 01/06/2022     01/06/2022     Monitor serial CBC and transfuse if patient becomes hemodynamically unstable, symptomatic or H/H drops below 7/21.           VTE Risk Mitigation (From admission, onward)         Ordered     enoxaparin injection 40 mg  Daily         01/04/22 0034     IP VTE HIGH RISK PATIENT  Once         01/04/22 0034     Place sequential compression device  Until discontinued         01/04/22 0034                Discharge Planning   [place]: 1/9/2022     Code Status: Full Code   Is the patient medically ready for discharge?:     Reason for patient still in hospital (select all that apply): Patient trending condition  Discharge Plan A: Return to nursing home            Critical care time spent on the evaluation and treatment of severe organ dysfunction, review of pertinent labs and imaging studies, discussions with consulting providers and discussions with patient/family: 33 minutes.      Geeta Siddiqi MD  Department of Hospital Medicine   [facility]

## 2022-01-08 NOTE — NURSING
Dr Siddiqi rounding.  Updated on pt status.  Plan to increase feedings slowly.  Per Dr Siddiqi, when reach 30cc/hr, leave at that rate until tomorrow (1/9/22).

## 2022-01-08 NOTE — SUBJECTIVE & OBJECTIVE
Interval History: Pulmonology following. Sputum culture grew Pseudomonas and Serratia species. Blood culture with CoNS- contaminant.  Urine with presumptive proteus.  Currently receiving NG tube feeding at 20 cc/hour.    Review of Systems   Unable to perform ROS: Patient nonverbal     Objective:     Vital Signs (Most Recent):  Temp: 96.8 °F (36 °C) (01/08/22 0800)  Pulse: 103 (01/08/22 0800)  Resp: (!) 36 (01/08/22 0800)  BP: 135/63 (01/08/22 0730)  SpO2: 96 % (01/08/22 0800) Vital Signs (24h Range):  Temp:  [96.08 °F (35.6 °C)-99.32 °F (37.4 °C)] 96.8 °F (36 °C)  Pulse:  [] 103  Resp:  [19-36] 36  SpO2:  [93 %-100 %] 96 %  BP: ()/(53-96) 135/63     Weight: 70.9 kg (156 lb 4.9 oz)  Body mass index is 30.53 kg/m².    Intake/Output Summary (Last 24 hours) at 1/8/2022 1019  Last data filed at 1/8/2022 0730  Gross per 24 hour   Intake 7051.95 ml   Output 1625 ml   Net 5426.95 ml      Physical Exam  Vitals and nursing note reviewed.   Constitutional:       General: She is not in acute distress.     Appearance: She is well-developed. She is ill-appearing.      Comments: Chronic trach/vent dependent   HENT:      Head: Normocephalic and atraumatic.      Mouth/Throat:      Mouth: Mucous membranes are dry.   Cardiovascular:      Rate and Rhythm: Normal rate and regular rhythm.   Pulmonary:      Effort: Pulmonary effort is normal. No respiratory distress.      Breath sounds: Rhonchi present.   Abdominal:      General: Bowel sounds are normal.      Palpations: Abdomen is soft.      Tenderness: There is no abdominal tenderness.      Comments: g tube in place   Musculoskeletal:      Cervical back: Normal range of motion and neck supple.      Comments: Contractures to hands   Skin:     General: Skin is warm and dry.   Neurological:      GCS: GCS eye subscore is 1. GCS verbal subscore is 1. GCS motor subscore is 1.   Psychiatric:      Comments: Unable to assess         Significant Labs:  Lab Results   Component Value  Date    WBC 4.91 01/06/2022    HGB 8.2 (L) 01/06/2022    HCT 26.9 (L) 01/06/2022    MCV 92 01/06/2022     01/06/2022       CMP  Sodium   Date Value Ref Range Status   01/08/2022 130 (L) 136 - 145 mmol/L Final     Potassium   Date Value Ref Range Status   01/08/2022 3.6 3.5 - 5.1 mmol/L Final     Chloride   Date Value Ref Range Status   01/08/2022 100 95 - 110 mmol/L Final     CO2   Date Value Ref Range Status   01/08/2022 19 (L) 23 - 29 mmol/L Final     Glucose   Date Value Ref Range Status   01/08/2022 77 70 - 110 mg/dL Final     BUN   Date Value Ref Range Status   01/08/2022 7 6 - 20 mg/dL Final     Creatinine   Date Value Ref Range Status   01/08/2022 0.4 (L) 0.5 - 1.4 mg/dL Final     Calcium   Date Value Ref Range Status   01/08/2022 8.5 (L) 8.7 - 10.5 mg/dL Final     Total Protein   Date Value Ref Range Status   01/08/2022 6.1 6.0 - 8.4 g/dL Final     Albumin   Date Value Ref Range Status   01/08/2022 2.1 (L) 3.5 - 5.2 g/dL Final     Total Bilirubin   Date Value Ref Range Status   01/08/2022 1.6 (H) 0.1 - 1.0 mg/dL Final     Comment:     For infants and newborns, interpretation of results should be based  on gestational age, weight and in agreement with clinical  observations.    Premature Infant recommended reference ranges:  Up to 24 hours.............<8.0 mg/dL  Up to 48 hours............<12.0 mg/dL  3-5 days..................<15.0 mg/dL  6-29 days.................<15.0 mg/dL       Alkaline Phosphatase   Date Value Ref Range Status   01/08/2022 143 (H) 55 - 135 U/L Final     AST   Date Value Ref Range Status   01/08/2022 14 10 - 40 U/L Final     ALT   Date Value Ref Range Status   01/08/2022 20 10 - 44 U/L Final     Anion Gap   Date Value Ref Range Status   01/08/2022 11 8 - 16 mmol/L Final     eGFR if    Date Value Ref Range Status   01/08/2022 >60 >60 mL/min/1.73 m^2 Final     eGFR if non    Date Value Ref Range Status   01/08/2022 >60 >60 mL/min/1.73 m^2 Final      Comment:     Calculation used to obtain the estimated glomerular filtration  rate (eGFR) is the CKD-EPI equation.        Microbiology Results (last 7 days)     Procedure Component Value Units Date/Time    Culture, Respiratory with Gram Stain [[id number]]  (Abnormal)  (Susceptibility) Collected: 01/05/22 2009    Order Status: Completed Specimen: Respiratory from Endotracheal Aspirate Updated: 01/08/22 0910     Respiratory Culture No S aureus isolated.      SERRATIA MARCESCENS  Many        PRESUMPTIVE PSEUDOMONAS SPECIES  Moderate  Identification and susceptibility pending       Gram Stain (Respiratory) <10 epithelial cells per low power field.     Gram Stain (Respiratory) Rare WBC's     Gram Stain (Respiratory) Few Gram negative rods     Gram Stain (Respiratory) Rare yeast    Blood culture x two cultures. Draw prior to antibiotics. [[id number]] Collected: 01/03/22 1950    Order Status: Completed Specimen: Blood Updated: 01/08/22 0612     Blood Culture, Routine No Growth to date      No Growth to date      No Growth to date      No Growth to date      No Growth to date    Narrative:      Aerobic and anaerobic    Urine culture [[id number]]  (Abnormal)  (Susceptibility) Collected: 01/03/22 1943    Order Status: Completed Specimen: Urine Updated: 01/07/22 1553     Urine Culture, Routine PROTEUS MIRABILIS  >100,000 cfu/ml      Narrative:      Specimen Source->Urine    Blood culture x two cultures. Draw prior to antibiotics. [[id number]]  (Abnormal) Collected: 01/03/22 1950    Order Status: Completed Specimen: Blood Updated: 01/06/22 1313     Blood Culture, Routine Gram stain peds bottle: Gram positive cocci in clusters resembling Staph      Results called to and read back by:Yvonne Gamez RN 01/04/2022  23:08      COAGULASE-NEGATIVE STAPHYLOCOCCUS SPECIES  Organism is a probable contaminant      Narrative:      Aerobic and anaerobic          Significant Imaging:  CXR:  Worsening airspace opacities in bilateral lungs,  suspicious for multifocal pneumonia.    CXR: Resolving intrapulmonary infiltrates with a small amount of consolidation remaining at the right lung base. Tracheostomy tube in position.    KUB: No acute abdominal process.    CXR: Resolving intrapulmonary infiltrates with a small amount of consolidation remaining at the right lung base. Tracheostomy tube in position.

## 2022-01-08 NOTE — PROGRESS NOTES
Progress Note  PULMONARY    Admit Date: 1/3/2022   01/08/2022      SUBJECTIVE:     1/5- no new issues  1/6- no new issues  1/7- remains on vent, GNR on sputum culture, CXR better. Per RN multiple episodes emesis so tube feeds are held  1/8- no new issues      OBJECTIVE:     Vitals (Most recent):  Vitals:    01/08/22 0800   BP:    Pulse: 103   Resp: (!) 36   Temp: 96.8 °F (36 °C)       Vitals (24 hour range):  Temp:  [96.08 °F (35.6 °C)-99.32 °F (37.4 °C)]   Pulse:  []   Resp:  [19-40]   BP: ()/(53-96)   SpO2:  [93 %-100 %]       Intake/Output Summary (Last 24 hours) at 1/8/2022 0847  Last data filed at 1/8/2022 0730  Gross per 24 hour   Intake 7051.95 ml   Output 1925 ml   Net 5126.95 ml          Physical Exam:  The patient's neuro status (alertness,orientation,cognitive function,motor skills,), pharyngeal exam (oral lesions, hygiene, abn dentition,), Neck (jvd,mass,thyroid,nodes in neck and above/below clavicle),RESPIRATORY(symmetry,effort,fremitus,percussion,auscultation),  Cor(rhythm,heart tones including gallops,perfusion,edema)ABD(distention,hepatic&splenomegaly,tenderness,masses), Skin(rash,cyanosis),Psyc(affect,judgement,).  Exam negative except for these pertinent findings:    Unresponsive, eyes open and deviate to right, grimaces, no distress  Trach w vent  Bilateral coarse breath sounds  Abdomen soft, distended, hypoactive bowel sounds, PEG in place  Contracted upper ext  L hand dressed        Radiographs reviewed: view by direct vision   CXR 1/7- improved infiltrates  CXR 1/3/22- bilateral lower lobe infiltrates, worsened compared to previous film    Labs     No results for input(s): WBC, HGB, HCT, PLT, BAND, METAMYELOCYT, MYELOPCT, HGBA1C in the last 24 hours.  Recent Labs   Lab 01/08/22  0324   *   K 3.6      CO2 19*   BUN 7   CREATININE 0.4*   GLU 77   CALCIUM 8.5*   MG 1.8   PHOS 3.0   AST 14   ALT 20   ALKPHOS 143*   BILITOT 1.6*   PROT 6.1   ALBUMIN 2.1*   No results for  input(s): PH, PCO2, PO2, HCO3 in the last 24 hours.  Microbiology Results (last 7 days)     Procedure Component Value Units Date/Time    Blood culture x two cultures. Draw prior to antibiotics. [[id number]] Collected: 01/03/22 1950    Order Status: Completed Specimen: Blood Updated: 01/08/22 0612     Blood Culture, Routine No Growth to date      No Growth to date      No Growth to date      No Growth to date      No Growth to date    Narrative:      Aerobic and anaerobic    Urine culture [[id number]]  (Abnormal)  (Susceptibility) Collected: 01/03/22 1943    Order Status: Completed Specimen: Urine Updated: 01/07/22 1553     Urine Culture, Routine PROTEUS MIRABILIS  >100,000 cfu/ml      Narrative:      Specimen Source->Urine    Culture, Respiratory with Gram Stain [[id number]]  (Abnormal) Collected: 01/05/22 2009    Order Status: Completed Specimen: Respiratory from Endotracheal Aspirate Updated: 01/07/22 1035     Respiratory Culture GRAM NEGATIVE CHRISTINA  Many  Identification and susceptibility pending       Gram Stain (Respiratory) <10 epithelial cells per low power field.     Gram Stain (Respiratory) Rare WBC's     Gram Stain (Respiratory) Few Gram negative rods     Gram Stain (Respiratory) Rare yeast    Blood culture x two cultures. Draw prior to antibiotics. [[id number]]  (Abnormal) Collected: 01/03/22 1950    Order Status: Completed Specimen: Blood Updated: 01/06/22 1313     Blood Culture, Routine Gram stain peds bottle: Gram positive cocci in clusters resembling Staph      Results called to and read back by:Yvonne Gamez RN 01/04/2022  23:08      COAGULASE-NEGATIVE STAPHYLOCOCCUS SPECIES  Organism is a probable contaminant      Narrative:      Aerobic and anaerobic          Impression:  Active Hospital Problems    Diagnosis  POA    *Sepsis [A41.9]  Yes    Hyponatremia [E87.1]  Yes    UTI (urinary tract infection) [N39.0]  Yes    Ventilator associated pneumonia [J95.851]  Yes    Essential hypertension [I10]  Yes     Gastroesophageal reflux disease [K21.9]  Yes    Malnutrition of moderate degree [E44.0]  Yes    Chronic respiratory failure with hypoxia [J96.11]  Yes    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  Added automatically from request for surgery [id number]        Resolved Hospital Problems   No resolved problems to display.               Plan:     - continue vent, no weaning  - continue broad spectrum antibiotics  - follow up sputum culture- GNR. Urine with proteus  - lovenox subq for DVT prophylaxis  - pepcid for GI prophylaxis  - wound care   - continue midodrine  - monitor Hgb  - tube feeding when able to tolerate    Dora Montesinos MD  Pulmonary & Critical Care Medicine                            .

## 2022-01-08 NOTE — PLAN OF CARE
Pt remains in ICU.  Warmer in use.  Appears to be tolerating her diet although abd firm and distended.  Less than 10 cc residual this shift.  Rate of feeding up to 30cc/hr and is to stay at 30 until tomorrow per Dr Siddiqi.  Rotation on bed.  CBGs wnl.  Safety maintained.

## 2022-01-08 NOTE — PROGRESS NOTES
Pharmacokinetic Assessment Follow Up: IV Vancomycin    Vancomycin serum concentration assessment(s):    The trough level was drawn correctly and can be used to guide therapy at this time. The measurement is below the desired definitive target range of 15 to 20 mcg/mL.    Vancomycin Regimen Plan:    Change regimen to Vancomycin 1500 mg IV every 18 hours with next serum trough concentration measured at 0630 prior to 3rd dose on 01/09    Drug levels (last 3 results):  Recent Labs   Lab Result Units 01/05/22  0849 01/06/22  0448 01/07/22  1743   Vancomycin, Random ug/mL 8.6 11.7  --    Vancomycin-Trough ug/mL  --   --  12.5       Pharmacy will continue to follow and monitor vancomycin.    Please contact pharmacy at extension [phone] for questions regarding this assessment.    Thank you for the consult,   Tray Suresh       Patient brief summary:  Genna Felix is a 61 y.o. female initiated on antimicrobial therapy with IV Vancomycin for treatment of  pneumonia    Drug Allergies:   Review of patient's allergies indicates:  No Known Allergies    Actual Body Weight:   69 kg    Renal Function:   Estimated Creatinine Clearance: 131.2 mL/min (A) (based on SCr of 0.4 mg/dL (L)).,     Dialysis Method (if applicable):  N/A    CBC (last 72 hours):  Recent Labs   Lab Result Units 01/05/22  0408 01/06/22  0448   WBC K/uL 4.70 4.91   Hemoglobin g/dL 7.2* 8.2*   Hematocrit % 23.6* 26.9*   Platelets K/uL 274 302   Gran % % 62.5 62.1   Lymph % % 21.1 22.6   Mono % % 13.2 10.4   Eosinophil % % 2.8 4.7   Basophil % % 0.2 0.2   Differential Method  Automated Automated       Metabolic Panel (last 72 hours):  Recent Labs   Lab Result Units 01/05/22  0408 01/05/22  1548 01/06/22  0448 01/07/22  0340 01/07/22  1433   Sodium mmol/L 137  --  137 132*  --    Potassium mmol/L 3.6 4.6 4.4 3.6 4.3   Chloride mmol/L 109  --  107 100  --    CO2 mmol/L 17*  --  20* 23  --    Glucose mg/dL 80  --  102 114*  --    BUN mg/dL 9  --  16 14  --    Creatinine  mg/dL 0.5  --  0.5 0.4*  --    Albumin g/dL 2.4*  --  2.5* 2.5*  --    Total Bilirubin mg/dL 0.8  --  0.7 1.2*  --    Alkaline Phosphatase U/L 141*  --  152* 156*  --    AST U/L 14  --  15 16  --    ALT U/L 27  --  26 26  --    Magnesium mg/dL 1.7 2.0 1.8 1.4* 1.9   Phosphorus mg/dL 2.9  --  2.6* 3.1  --        Vancomycin Administrations:  vancomycin given in the last 96 hours                     vancomycin 1.25 g in dextrose 5% 250 mL IVPB (ready to mix) (mg) 1,250 mg New Bag 01/07/22 0001     1,250 mg New Bag 01/06/22 0624    vancomycin 1.25 g in dextrose 5% 250 mL IVPB (ready to mix) (mg) 1,250 mg New Bag 01/05/22 1134    vancomycin 750 mg in dextrose 5 % 250 mL IVPB (ready to mix system) (mg) 750 mg New Bag 01/04/22 1755    vancomycin in dextrose 5 % 1 gram/250 mL IVPB 1,000 mg (mg) 1,000 mg New Bag 01/03/22 2010                    Microbiologic Results:  Microbiology Results (last 7 days)       Procedure Component Value Units Date/Time    Urine culture [[id number]]  (Abnormal)  (Susceptibility) Collected: 01/03/22 1943    Order Status: Completed Specimen: Urine Updated: 01/07/22 1553     Urine Culture, Routine PROTEUS MIRABILIS  >100,000 cfu/ml      Narrative:      Specimen Source->Urine    Culture, Respiratory with Gram Stain [[id number]]  (Abnormal) Collected: 01/05/22 2009    Order Status: Completed Specimen: Respiratory from Endotracheal Aspirate Updated: 01/07/22 1035     Respiratory Culture GRAM NEGATIVE CHRISTINA  Many  Identification and susceptibility pending       Gram Stain (Respiratory) <10 epithelial cells per low power field.     Gram Stain (Respiratory) Rare WBC's     Gram Stain (Respiratory) Few Gram negative rods     Gram Stain (Respiratory) Rare yeast    Blood culture x two cultures. Draw prior to antibiotics. [[id number]] Collected: 01/03/22 1950    Order Status: Completed Specimen: Blood Updated: 01/07/22 0612     Blood Culture, Routine No Growth to date      No Growth to date      No Growth to date       No Growth to date    Narrative:      Aerobic and anaerobic    Blood culture x two cultures. Draw prior to antibiotics. [[id number]]  (Abnormal) Collected: 01/03/22 1950    Order Status: Completed Specimen: Blood Updated: 01/06/22 1313     Blood Culture, Routine Gram stain peds bottle: Gram positive cocci in clusters resembling Staph      Results called to and read back by:Yvonne Gamez RN 01/04/2022  23:08      COAGULASE-NEGATIVE STAPHYLOCOCCUS SPECIES  Organism is a probable contaminant      Narrative:      Aerobic and anaerobic

## 2022-01-09 LAB
ALBUMIN SERPL BCP-MCNC: 1.9 G/DL (ref 3.5–5.2)
ALP SERPL-CCNC: 138 U/L (ref 55–135)
ALT SERPL W/O P-5'-P-CCNC: 20 U/L (ref 10–44)
ANION GAP SERPL CALC-SCNC: 10 MMOL/L (ref 8–16)
ANION GAP SERPL CALC-SCNC: 10 MMOL/L (ref 8–16)
AST SERPL-CCNC: 13 U/L (ref 10–40)
BACTERIA BLD CULT: NORMAL
BILIRUB SERPL-MCNC: 1.2 MG/DL (ref 0.1–1)
BUN SERPL-MCNC: 7 MG/DL (ref 6–20)
BUN SERPL-MCNC: 7 MG/DL (ref 6–20)
CALCIUM SERPL-MCNC: 8.5 MG/DL (ref 8.7–10.5)
CALCIUM SERPL-MCNC: 8.7 MG/DL (ref 8.7–10.5)
CHLORIDE SERPL-SCNC: 105 MMOL/L (ref 95–110)
CHLORIDE SERPL-SCNC: 105 MMOL/L (ref 95–110)
CO2 SERPL-SCNC: 19 MMOL/L (ref 23–29)
CO2 SERPL-SCNC: 20 MMOL/L (ref 23–29)
CREAT SERPL-MCNC: 0.5 MG/DL (ref 0.5–1.4)
CREAT SERPL-MCNC: 0.5 MG/DL (ref 0.5–1.4)
EST. GFR  (AFRICAN AMERICAN): >60 ML/MIN/1.73 M^2
EST. GFR  (AFRICAN AMERICAN): >60 ML/MIN/1.73 M^2
EST. GFR  (NON AFRICAN AMERICAN): >60 ML/MIN/1.73 M^2
EST. GFR  (NON AFRICAN AMERICAN): >60 ML/MIN/1.73 M^2
GLUCOSE SERPL-MCNC: 100 MG/DL (ref 70–110)
GLUCOSE SERPL-MCNC: 110 MG/DL (ref 70–110)
MAGNESIUM SERPL-MCNC: 1.8 MG/DL (ref 1.6–2.6)
PHOSPHATE SERPL-MCNC: 3.4 MG/DL (ref 2.7–4.5)
POCT GLUCOSE: 106 MG/DL (ref 70–110)
POCT GLUCOSE: 155 MG/DL (ref 70–110)
POCT GLUCOSE: 34 MG/DL (ref 70–110)
POCT GLUCOSE: 76 MG/DL (ref 70–110)
POCT GLUCOSE: 84 MG/DL (ref 70–110)
POTASSIUM SERPL-SCNC: 3.8 MMOL/L (ref 3.5–5.1)
POTASSIUM SERPL-SCNC: 4.4 MMOL/L (ref 3.5–5.1)
PROT SERPL-MCNC: 6 G/DL (ref 6–8.4)
SODIUM SERPL-SCNC: 134 MMOL/L (ref 136–145)
SODIUM SERPL-SCNC: 135 MMOL/L (ref 136–145)
VANCOMYCIN TROUGH SERPL-MCNC: 16.6 UG/ML (ref 10–22)

## 2022-01-09 PROCEDURE — 99900035 HC TECH TIME PER 15 MIN (STAT)

## 2022-01-09 PROCEDURE — 20000000 HC ICU ROOM

## 2022-01-09 PROCEDURE — 27000221 HC OXYGEN, UP TO 24 HOURS

## 2022-01-09 PROCEDURE — 80048 BASIC METABOLIC PNL TOTAL CA: CPT | Performed by: INTERNAL MEDICINE

## 2022-01-09 PROCEDURE — 36415 COLL VENOUS BLD VENIPUNCTURE: CPT | Performed by: INTERNAL MEDICINE

## 2022-01-09 PROCEDURE — 63600175 PHARM REV CODE 636 W HCPCS: Performed by: INTERNAL MEDICINE

## 2022-01-09 PROCEDURE — 25000003 PHARM REV CODE 250: Performed by: INTERNAL MEDICINE

## 2022-01-09 PROCEDURE — 80202 ASSAY OF VANCOMYCIN: CPT | Performed by: INTERNAL MEDICINE

## 2022-01-09 PROCEDURE — 27000207 HC ISOLATION

## 2022-01-09 PROCEDURE — 25000003 PHARM REV CODE 250: Performed by: NURSE PRACTITIONER

## 2022-01-09 PROCEDURE — 94761 N-INVAS EAR/PLS OXIMETRY MLT: CPT

## 2022-01-09 PROCEDURE — 63600175 PHARM REV CODE 636 W HCPCS: Performed by: NURSE PRACTITIONER

## 2022-01-09 PROCEDURE — 80053 COMPREHEN METABOLIC PANEL: CPT | Performed by: NURSE PRACTITIONER

## 2022-01-09 PROCEDURE — 99900026 HC AIRWAY MAINTENANCE (STAT)

## 2022-01-09 PROCEDURE — 63600175 PHARM REV CODE 636 W HCPCS: Performed by: SURGERY

## 2022-01-09 PROCEDURE — 83735 ASSAY OF MAGNESIUM: CPT | Performed by: NURSE PRACTITIONER

## 2022-01-09 PROCEDURE — 99233 SBSQ HOSP IP/OBS HIGH 50: CPT | Mod: ,,, | Performed by: INTERNAL MEDICINE

## 2022-01-09 PROCEDURE — 84100 ASSAY OF PHOSPHORUS: CPT | Performed by: NURSE PRACTITIONER

## 2022-01-09 PROCEDURE — 25000003 PHARM REV CODE 250: Performed by: HOSPITALIST

## 2022-01-09 PROCEDURE — 25000242 PHARM REV CODE 250 ALT 637 W/ HCPCS: Performed by: NURSE PRACTITIONER

## 2022-01-09 PROCEDURE — 94640 AIRWAY INHALATION TREATMENT: CPT

## 2022-01-09 PROCEDURE — 25000003 PHARM REV CODE 250: Performed by: SURGERY

## 2022-01-09 PROCEDURE — 99233 PR SUBSEQUENT HOSPITAL CARE,LEVL III: ICD-10-PCS | Mod: ,,, | Performed by: INTERNAL MEDICINE

## 2022-01-09 RX ORDER — MORPHINE SULFATE 2 MG/ML
2 INJECTION, SOLUTION INTRAMUSCULAR; INTRAVENOUS ONCE
Status: COMPLETED | OUTPATIENT
Start: 2022-01-09 | End: 2022-01-09

## 2022-01-09 RX ORDER — MIDAZOLAM HYDROCHLORIDE 1 MG/ML
2 INJECTION INTRAMUSCULAR; INTRAVENOUS ONCE
Status: COMPLETED | OUTPATIENT
Start: 2022-01-09 | End: 2022-01-09

## 2022-01-09 RX ADMIN — IPRATROPIUM BROMIDE AND ALBUTEROL SULFATE 3 ML: 2.5; .5 SOLUTION RESPIRATORY (INHALATION) at 12:01

## 2022-01-09 RX ADMIN — MEROPENEM 2 G: 1 INJECTION, POWDER, FOR SOLUTION INTRAVENOUS at 08:01

## 2022-01-09 RX ADMIN — MIDODRINE HYDROCHLORIDE 10 MG: 5 TABLET ORAL at 09:01

## 2022-01-09 RX ADMIN — PIPERACILLIN AND TAZOBACTAM 4.5 G: 4; .5 INJECTION, POWDER, LYOPHILIZED, FOR SOLUTION INTRAVENOUS; PARENTERAL at 05:01

## 2022-01-09 RX ADMIN — FAMOTIDINE 20 MG: 10 INJECTION INTRAVENOUS at 09:01

## 2022-01-09 RX ADMIN — CHLORHEXIDINE GLUCONATE 0.12% ORAL RINSE 15 ML: 1.2 LIQUID ORAL at 08:01

## 2022-01-09 RX ADMIN — MIDAZOLAM 2 MG: 1 INJECTION INTRAMUSCULAR; INTRAVENOUS at 01:01

## 2022-01-09 RX ADMIN — MEROPENEM 2 G: 1 INJECTION, POWDER, FOR SOLUTION INTRAVENOUS at 11:01

## 2022-01-09 RX ADMIN — MORPHINE SULFATE 2 MG: 2 INJECTION, SOLUTION INTRAMUSCULAR; INTRAVENOUS at 03:01

## 2022-01-09 RX ADMIN — FAMOTIDINE 20 MG: 10 INJECTION INTRAVENOUS at 08:01

## 2022-01-09 RX ADMIN — CHLORHEXIDINE GLUCONATE 0.12% ORAL RINSE 15 ML: 1.2 LIQUID ORAL at 09:01

## 2022-01-09 RX ADMIN — SODIUM CHLORIDE: 0.9 INJECTION, SOLUTION INTRAVENOUS at 04:01

## 2022-01-09 RX ADMIN — IPRATROPIUM BROMIDE AND ALBUTEROL SULFATE 3 ML: 2.5; .5 SOLUTION RESPIRATORY (INHALATION) at 03:01

## 2022-01-09 RX ADMIN — IPRATROPIUM BROMIDE AND ALBUTEROL SULFATE 3 ML: 2.5; .5 SOLUTION RESPIRATORY (INHALATION) at 11:01

## 2022-01-09 RX ADMIN — SODIUM CHLORIDE 150 ML/HR: 0.9 INJECTION, SOLUTION INTRAVENOUS at 11:01

## 2022-01-09 RX ADMIN — POTASSIUM BICARBONATE 50 MEQ: 977.5 TABLET, EFFERVESCENT ORAL at 10:01

## 2022-01-09 RX ADMIN — MORPHINE SULFATE 2 MG: 2 INJECTION, SOLUTION INTRAMUSCULAR; INTRAVENOUS at 12:01

## 2022-01-09 RX ADMIN — IPRATROPIUM BROMIDE AND ALBUTEROL SULFATE 3 ML: 2.5; .5 SOLUTION RESPIRATORY (INHALATION) at 07:01

## 2022-01-09 RX ADMIN — IPRATROPIUM BROMIDE AND ALBUTEROL SULFATE 3 ML: 2.5; .5 SOLUTION RESPIRATORY (INHALATION) at 04:01

## 2022-01-09 RX ADMIN — IPRATROPIUM BROMIDE AND ALBUTEROL SULFATE 3 ML: 2.5; .5 SOLUTION RESPIRATORY (INHALATION) at 08:01

## 2022-01-09 RX ADMIN — MIDODRINE HYDROCHLORIDE 10 MG: 5 TABLET ORAL at 08:01

## 2022-01-09 RX ADMIN — SODIUM CHLORIDE: 0.9 INJECTION, SOLUTION INTRAVENOUS at 06:01

## 2022-01-09 RX ADMIN — MIDODRINE HYDROCHLORIDE 10 MG: 5 TABLET ORAL at 03:01

## 2022-01-09 RX ADMIN — ACETAMINOPHEN 650 MG: 325 TABLET ORAL at 03:01

## 2022-01-09 RX ADMIN — ENOXAPARIN SODIUM 40 MG: 40 INJECTION SUBCUTANEOUS at 05:01

## 2022-01-09 NOTE — PROGRESS NOTES
Pharmacokinetic Assessment Follow Up: IV Vancomycin    Vancomycin serum concentration assessment(s):    The trough level was drawn correctly and can be used to guide therapy at this time. The measurement is within the desired definitive target range of 15 to 20 mcg/mL.    Vancomycin Regimen Plan:    Continue regimen to Vancomycin 1500 mg IV every 18 hours with next serum trough concentration measured at 1830 prior to 3rd dose on 01/10/2022    Drug levels (last 3 results):  Recent Labs   Lab Result Units 01/07/22  1743 01/09/22  0601   Vancomycin-Trough ug/mL 12.5 16.6       Pharmacy will continue to follow and monitor vancomycin.    Please contact pharmacy at extension [phone] for questions regarding this assessment.    Thank you for the consult,   @[email or web address]       Patient brief summary:  Genna Felix is a 61 y.o. female initiated on antimicrobial therapy with IV Vancomycin for treatment of lower respiratory infection    The patient's current regimen is vancomycin 1500 mg every 18 hours.    Drug Allergies:   Review of patient's allergies indicates:  No Known Allergies    Actual Body Weight:   72.6 kg (160 lb 0.9 oz)    Renal Function:   Estimated Creatinine Clearance: 134.6 mL/min (A) (based on SCr of 0.4 mg/dL (L)).,     Dialysis Method (if applicable):  N/A    CBC (last 72 hours):  No results for input(s): WHITE BLOOD CELL COUNT, HEMOGLOBIN, HEMATOCRIT, PLATELETS, GRAN%, LYMPH%, MONO%, EOSINOPHIL%, BASOPHIL%, DIFFERENTIAL METHOD in the last 72 hours.    Metabolic Panel (last 72 hours):  Recent Labs   Lab Result Units 01/07/22  0340 01/07/22  1433 01/08/22  0324 01/08/22  1049   Sodium mmol/L 132*  --  130*  --    Potassium mmol/L 3.6 4.3 3.6 4.5   Chloride mmol/L 100  --  100  --    CO2 mmol/L 23  --  19*  --    Glucose mg/dL 114*  --  77  --    BUN mg/dL 14  --  7  --    Creatinine mg/dL 0.4*  --  0.4*  --    Albumin g/dL 2.5*  --  2.1*  --    Total Bilirubin mg/dL 1.2*  --  1.6*  --    Alkaline Phosphatase U/L  156*  --  143*  --    AST U/L 16  --  14  --    ALT U/L 26  --  20  --    Magnesium mg/dL 1.4* 1.9 1.8 2.2   Phosphorus mg/dL 3.1  --  3.0  --        Vancomycin Administrations:  vancomycin given in the last 96 hours                     vancomycin 1.5 g in dextrose 5 % 250 mL IVPB (ready to mix) (mg) 1,500 mg New Bag 01/08/22 1310     1,500 mg New Bag 01/07/22 2044    vancomycin 1.25 g in dextrose 5% 250 mL IVPB (ready to mix) (mg) 1,250 mg New Bag 01/07/22 0001     1,250 mg New Bag 01/06/22 0624    vancomycin 1.25 g in dextrose 5% 250 mL IVPB (ready to mix) (mg) 1,250 mg New Bag 01/05/22 1134                    Microbiologic Results:  Microbiology Results (last 7 days)       Procedure Component Value Units Date/Time    Blood culture x two cultures. Draw prior to antibiotics. [[id number]] Collected: 01/03/22 1950    Order Status: Completed Specimen: Blood Updated: 01/09/22 0612     Blood Culture, Routine No growth after 5 days.    Narrative:      Aerobic and anaerobic    Culture, Respiratory with Gram Stain [[id number]]  (Abnormal)  (Susceptibility) Collected: 01/05/22 2009    Order Status: Completed Specimen: Respiratory from Endotracheal Aspirate Updated: 01/08/22 1318     Respiratory Culture No S aureus isolated.      SERRATIA MARCESCENS  Many        KLEBSIELLA PNEUMONIAE ESBL  Moderate        PRESUMPTIVE PSEUDOMONAS SPECIES  Moderate  Identification and susceptibility pending       Gram Stain (Respiratory) <10 epithelial cells per low power field.     Gram Stain (Respiratory) Rare WBC's     Gram Stain (Respiratory) Few Gram negative rods     Gram Stain (Respiratory) Rare yeast    Urine culture [[id number]]  (Abnormal)  (Susceptibility) Collected: 01/03/22 1943    Order Status: Completed Specimen: Urine Updated: 01/07/22 1553     Urine Culture, Routine PROTEUS MIRABILIS  >100,000 cfu/ml      Narrative:      Specimen Source->Urine    Blood culture x two cultures. Draw prior to antibiotics. [[id number]]  (Abnormal)  Collected: 01/03/22 1950    Order Status: Completed Specimen: Blood Updated: 01/06/22 1313     Blood Culture, Routine Gram stain peds bottle: Gram positive cocci in clusters resembling Staph      Results called to and read back by:Yvonne Gamez RN 01/04/2022  23:08      COAGULASE-NEGATIVE STAPHYLOCOCCUS SPECIES  Organism is a probable contaminant      Narrative:      Aerobic and anaerobic

## 2022-01-09 NOTE — PROGRESS NOTES
Progress Note  PULMONARY    Admit Date: 1/3/2022   01/09/2022      SUBJECTIVE:     1/5- no new issues  1/6- no new issues  1/7- remains on vent, GNR on sputum culture, CXR better. Per RN multiple episodes emesis so tube feeds are held  1/8- no new issues  1/9- no new issues      OBJECTIVE:     Vitals (Most recent):  Vitals:    01/09/22 0718   BP:    Pulse: 92   Resp: (!) 26   Temp: 96.26 °F (35.7 °C)       Vitals (24 hour range):  Temp:  [95.9 °F (35.5 °C)-97.7 °F (36.5 °C)]   Pulse:  []   Resp:  [24-62]   BP: ()/()   SpO2:  [92 %-100 %]       Intake/Output Summary (Last 24 hours) at 1/9/2022 0913  Last data filed at 1/9/2022 0530  Gross per 24 hour   Intake 800 ml   Output 2725 ml   Net -1925 ml          Physical Exam:  The patient's neuro status (alertness,orientation,cognitive function,motor skills,), pharyngeal exam (oral lesions, hygiene, abn dentition,), Neck (jvd,mass,thyroid,nodes in neck and above/below clavicle),RESPIRATORY(symmetry,effort,fremitus,percussion,auscultation),  Cor(rhythm,heart tones including gallops,perfusion,edema)ABD(distention,hepatic&splenomegaly,tenderness,masses), Skin(rash,cyanosis),Psyc(affect,judgement,).  Exam negative except for these pertinent findings:    Unresponsive, eyes open and deviate to right, grimaces, no distress  Trach w vent  Bilateral coarse breath sounds  Abdomen soft, distended, hypoactive bowel sounds, PEG in place  Contracted upper ext  L hand dressed        Radiographs reviewed: view by direct vision   CXR 1/7- improved infiltrates  CXR 1/3/22- bilateral lower lobe infiltrates, worsened compared to previous film    Labs     No results for input(s): WBC, HGB, HCT, PLT, BAND, METAMYELOCYT, MYELOPCT, HGBA1C in the last 24 hours.  Recent Labs   Lab 01/09/22  0601   *   K 3.8      CO2 19*   BUN 7   CREATININE 0.5      CALCIUM 8.5*   MG 1.8   PHOS 3.4   AST 13   ALT 20   ALKPHOS 138*   BILITOT 1.2*   PROT 6.0   ALBUMIN 1.9*   No  results for input(s): PH, PCO2, PO2, HCO3 in the last 24 hours.  Microbiology Results (last 7 days)     Procedure Component Value Units Date/Time    Blood culture x two cultures. Draw prior to antibiotics. [[id number]] Collected: 01/03/22 1950    Order Status: Completed Specimen: Blood Updated: 01/09/22 0612     Blood Culture, Routine No growth after 5 days.    Narrative:      Aerobic and anaerobic    Culture, Respiratory with Gram Stain [[id number]]  (Abnormal)  (Susceptibility) Collected: 01/05/22 2009    Order Status: Completed Specimen: Respiratory from Endotracheal Aspirate Updated: 01/08/22 1318     Respiratory Culture No S aureus isolated.      SERRATIA MARCESCENS  Many        KLEBSIELLA PNEUMONIAE ESBL  Moderate        PRESUMPTIVE PSEUDOMONAS SPECIES  Moderate  Identification and susceptibility pending       Gram Stain (Respiratory) <10 epithelial cells per low power field.     Gram Stain (Respiratory) Rare WBC's     Gram Stain (Respiratory) Few Gram negative rods     Gram Stain (Respiratory) Rare yeast    Urine culture [[id number]]  (Abnormal)  (Susceptibility) Collected: 01/03/22 1943    Order Status: Completed Specimen: Urine Updated: 01/07/22 1553     Urine Culture, Routine PROTEUS MIRABILIS  >100,000 cfu/ml      Narrative:      Specimen Source->Urine    Blood culture x two cultures. Draw prior to antibiotics. [[id number]]  (Abnormal) Collected: 01/03/22 1950    Order Status: Completed Specimen: Blood Updated: 01/06/22 1313     Blood Culture, Routine Gram stain peds bottle: Gram positive cocci in clusters resembling Staph      Results called to and read back by:Yvonne Gamez RN 01/04/2022  23:08      COAGULASE-NEGATIVE STAPHYLOCOCCUS SPECIES  Organism is a probable contaminant      Narrative:      Aerobic and anaerobic          Impression:  Active Hospital Problems    Diagnosis  POA    *Sepsis [A41.9]  Yes    Hyponatremia [E87.1]  Yes    UTI (urinary tract infection) [N39.0]  Yes    Ventilator  associated pneumonia [J95.851]  Yes    Essential hypertension [I10]  Yes    Gastroesophageal reflux disease [K21.9]  Yes    Malnutrition of moderate degree [E44.0]  Yes    Chronic respiratory failure with hypoxia [J96.11]  Yes    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  Added automatically from request for surgery [id number]        Resolved Hospital Problems   No resolved problems to display.               Plan:     - continue vent, no weaning  - dc vanco, zosyn. Start meropenem  - sputum- serratia, klebsiella, pseudomonas- some resistant Urine with proteus  - lovenox subq for DVT prophylaxis  - pepcid for GI prophylaxis  - wound care   - continue midodrine  - monitor Hgb  - tube feeding continue    Dora Montesinos MD  Pulmonary & Critical Care Medicine                            .

## 2022-01-09 NOTE — ASSESSMENT & PLAN NOTE
Failed OP abx  Pt vent dependent-consult pulmonology for vent management  On intravenous meropenem and vancomycin.  Blood and sputum cx  Nebulizer treatments

## 2022-01-09 NOTE — PROGRESS NOTES
Genna Felix [id number] is a 61 y.o. female who has been consulted for vancomycin dosing.    Pharmacy consult for vancomycin dosing in no longer required.  Vancomycin was discontinued.    Thank you for allowing us to participate in this patient's care.     Bradford Spencer, PharmD  [phone]

## 2022-01-09 NOTE — CARE UPDATE
01/08/22 1926   Patient Assessment/Suction   Level of Consciousness (AVPU) alert   Respiratory Effort Normal;Unlabored   Expansion/Accessory Muscles/Retractions expansion symmetric;no retractions;no use of accessory muscles   All Lung Fields Breath Sounds coarse   Suction Method tracheal   $ Suction Charges Inline Suction Procedure Stat Charge   Secretions Amount moderate   Secretions Color white   Secretions Characteristics thick   PRE-TX-O2   O2 Device (Oxygen Therapy) ventilator   Oxygen Concentration (%) 35   SpO2 99 %   Pulse Oximetry Type Continuous   Pulse 93   Resp (!) 42   Temp 97.52 °F (36.4 °C)   ETCO2   ETCO2 (mmHg) 21 mmHg   Aerosol Therapy   $ Aerosol Therapy Charges Aerosol Treatment   Respiratory Treatment Status (SVN) given   Treatment Route (SVN) in-line   Patient Position (SVN) HOB elevated   Signs of Intolerance (SVN) none   Breath Sounds Post-Respiratory Treatment   Throughout All Fields Post-Treatment All Fields   Throughout All Fields Post-Treatment no change   Post-treatment Heart Rate (beats/min) 94   Post-treatment Resp Rate (breaths/min) 32   Vent Select   Conventional Vent Y   Charged w/in last 24h YES   Preset Conventional Ventilator Settings   Vent Type NKV-550   Ventilation Type VC   Vent Mode A/CMV-VC   Humidity HME   Set Rate 24 BPM   Vt Set 350 mL   PEEP/CPAP 4.7 cmH20   Patient Ventilator Parameters   Resp Rate Total 30 br/min   Peak Airway Pressure 46.3 cmH2O   Exhaled Vt 339 mL   Total Ve 8.8 mL   I:E Ratio Measured 1.3:1   Inspired Tidal Volume (VTI) 378 mL   Conventional Ventilator Alarms   Apnea Rate 15   Apnea Volume (mL) 450 mL   Ready to Wean/Extubation Screen   FIO2<=50 (chart decimal) 0.35   MV<16L (chart vol.) 8.8   PEEP <=8 (chart #) 4.7   Ready to Wean Parameters   F/VT Ratio<105 (RSBI) 123.89

## 2022-01-09 NOTE — NURSING
Abdomen still distended. When pt started to cough, notice to have fluid leaking out through the side of peg. Was cleaned. Dressing applied.

## 2022-01-09 NOTE — PLAN OF CARE
Problem: Communication Impairment (Mechanical Ventilation, Invasive)  Goal: Effective Communication  Outcome: Ongoing, Not Progressing     Problem: Inability to Wean (Mechanical Ventilation, Invasive)  Goal: Mechanical Ventilation Liberation  Outcome: Ongoing, Not Progressing     Problem: Skin and Tissue Injury (Mechanical Ventilation, Invasive)  Goal: Absence of Device-Related Skin and Tissue Injury  Outcome: Ongoing, Not Progressing     Problem: Nutrition Impairment (Mechanical Ventilation, Invasive)  Goal: Optimal Nutrition Delivery  Outcome: Ongoing, Progressing   FIO2 at 40% Sat96%. Pt did settled down. Sinus rhythm 96. With rare PVC. Warming blanket restarted  temp down to 95.9 rectal. HOB up tube feeding at 30ml/hour. Abdomen still distended, firm. No BM noted. Mepilex intact to sacral area. Safety/fall prevention maintain.

## 2022-01-09 NOTE — PROGRESS NOTES
Alerted by bedside regarding patient being agitated and possibly in pain more than usual.   Bilateral breath sounds   No secretions seen during suctioning   Patient is not pulling in sufficient volumes due to this reason.     Versed 2 mg   Morphine 2 mg   CXR ABG   Re convene afterwards     AV equipment used and discussed with bedside RN and RT

## 2022-01-09 NOTE — EICU
Rounding (Video Assessment):  No    Intervention Initiated From:  Bedside    Sharlene Communicated with Bedside Nurse regarding:  Other      Comments: eLert received from bedside regarding increased respiratory rate and anxiety noted. Message relayed to Dr. Goodman.

## 2022-01-09 NOTE — NURSING
EICU called regarding pt's condition.Peak pressure high.Resp tech at bedside was giving tx.  Was just bagged and lavaged and suctioned. FIO2 up to 100%. Loud noise, air leak  coming out from trach. Additional air applied by RT. Had 2mg of morphine already at 0027.  with frequent ectopy.  Still restless.DR Mendez aware of pt's condition. With order. Another 2mg of Morphine given. Versed 2mg IV given also. Stat CXR done. Pt  Did settled down O2 at 40%  Hr now to 112 sinus tach .   0120 Placed call to EICU if MD can read pt's Xray. Safety maintain

## 2022-01-09 NOTE — EICU
Rounding (Video Assessment):  No    Intervention Initiated From:  Bedside    Sharlene Communicated with Bedside Nurse regarding:  other        Comments: Telephone call received from bedside nurse requesting MD to review CXR, message relayed to Dr. Goodman.

## 2022-01-09 NOTE — SUBJECTIVE & OBJECTIVE
Interval History: Pulmonology following. Sputum culture grew Pseudomonas, Klebsiella pneumoniae (ESBL species) and Serratia species. Blood culture with CoNS- contaminant.  Urine with Proteus species.  Peg tube feeding at goal.  Occasional use of warming blanket for hypothermia.    Review of Systems   Unable to perform ROS: Patient nonverbal     Objective:     Vital Signs (Most Recent):  Temp: 96.26 °F (35.7 °C) (01/09/22 0718)  Pulse: 92 (01/09/22 0718)  Resp: (!) 26 (01/09/22 0718)  BP: (!) 102/57 (01/09/22 0600)  SpO2: 95 % (01/09/22 0718) Vital Signs (24h Range):  Temp:  [95.9 °F (35.5 °C)-97.7 °F (36.5 °C)] 96.26 °F (35.7 °C)  Pulse:  [] 92  Resp:  [24-62] 26  SpO2:  [92 %-100 %] 95 %  BP: ()/() 102/57     Weight: 72.6 kg (160 lb 0.9 oz)  Body mass index is 31.26 kg/m².    Intake/Output Summary (Last 24 hours) at 1/9/2022 1026  Last data filed at 1/9/2022 0530  Gross per 24 hour   Intake 800 ml   Output 2325 ml   Net -1525 ml      Physical Exam  Vitals and nursing note reviewed.   Constitutional:       General: She is not in acute distress.     Appearance: She is well-developed. She is ill-appearing.      Comments: Chronic trach/vent dependent   HENT:      Head: Normocephalic and atraumatic.      Mouth/Throat:      Mouth: Mucous membranes are dry.   Cardiovascular:      Rate and Rhythm: Normal rate and regular rhythm.   Pulmonary:      Effort: Pulmonary effort is normal. No respiratory distress.      Breath sounds: Rhonchi present.   Abdominal:      General: Bowel sounds are normal.      Palpations: Abdomen is soft.      Tenderness: There is no abdominal tenderness.      Comments: g tube in place   Musculoskeletal:      Cervical back: Normal range of motion and neck supple.      Comments: Contractures to hands   Skin:     General: Skin is warm and dry.   Neurological:      GCS: GCS eye subscore is 1. GCS verbal subscore is 1. GCS motor subscore is 1.   Psychiatric:      Comments: Unable to  assess         Significant Labs:  Lab Results   Component Value Date    WBC 4.91 01/06/2022    HGB 8.2 (L) 01/06/2022    HCT 26.9 (L) 01/06/2022    MCV 92 01/06/2022     01/06/2022       CMP  Sodium   Date Value Ref Range Status   01/09/2022 134 (L) 136 - 145 mmol/L Final     Potassium   Date Value Ref Range Status   01/09/2022 3.8 3.5 - 5.1 mmol/L Final     Chloride   Date Value Ref Range Status   01/09/2022 105 95 - 110 mmol/L Final     CO2   Date Value Ref Range Status   01/09/2022 19 (L) 23 - 29 mmol/L Final     Glucose   Date Value Ref Range Status   01/09/2022 110 70 - 110 mg/dL Final     BUN   Date Value Ref Range Status   01/09/2022 7 6 - 20 mg/dL Final     Creatinine   Date Value Ref Range Status   01/09/2022 0.5 0.5 - 1.4 mg/dL Final     Calcium   Date Value Ref Range Status   01/09/2022 8.5 (L) 8.7 - 10.5 mg/dL Final     Total Protein   Date Value Ref Range Status   01/09/2022 6.0 6.0 - 8.4 g/dL Final     Albumin   Date Value Ref Range Status   01/09/2022 1.9 (L) 3.5 - 5.2 g/dL Final     Total Bilirubin   Date Value Ref Range Status   01/09/2022 1.2 (H) 0.1 - 1.0 mg/dL Final     Comment:     For infants and newborns, interpretation of results should be based  on gestational age, weight and in agreement with clinical  observations.    Premature Infant recommended reference ranges:  Up to 24 hours.............<8.0 mg/dL  Up to 48 hours............<12.0 mg/dL  3-5 days..................<15.0 mg/dL  6-29 days.................<15.0 mg/dL       Alkaline Phosphatase   Date Value Ref Range Status   01/09/2022 138 (H) 55 - 135 U/L Final     AST   Date Value Ref Range Status   01/09/2022 13 10 - 40 U/L Final     ALT   Date Value Ref Range Status   01/09/2022 20 10 - 44 U/L Final     Anion Gap   Date Value Ref Range Status   01/09/2022 10 8 - 16 mmol/L Final     eGFR if    Date Value Ref Range Status   01/09/2022 >60 >60 mL/min/1.73 m^2 Final     eGFR if non    Date Value Ref  Range Status   01/09/2022 >60 >60 mL/min/1.73 m^2 Final     Comment:     Calculation used to obtain the estimated glomerular filtration  rate (eGFR) is the CKD-EPI equation.        Microbiology Results (last 7 days)     Procedure Component Value Units Date/Time    Blood culture x two cultures. Draw prior to antibiotics. [[id number]] Collected: 01/03/22 1950    Order Status: Completed Specimen: Blood Updated: 01/09/22 0612     Blood Culture, Routine No growth after 5 days.    Narrative:      Aerobic and anaerobic    Culture, Respiratory with Gram Stain [[id number]]  (Abnormal)  (Susceptibility) Collected: 01/05/22 2009    Order Status: Completed Specimen: Respiratory from Endotracheal Aspirate Updated: 01/08/22 1318     Respiratory Culture No S aureus isolated.      SERRATIA MARCESCENS  Many        KLEBSIELLA PNEUMONIAE ESBL  Moderate        PRESUMPTIVE PSEUDOMONAS SPECIES  Moderate  Identification and susceptibility pending       Gram Stain (Respiratory) <10 epithelial cells per low power field.     Gram Stain (Respiratory) Rare WBC's     Gram Stain (Respiratory) Few Gram negative rods     Gram Stain (Respiratory) Rare yeast    Urine culture [[id number]]  (Abnormal)  (Susceptibility) Collected: 01/03/22 1943    Order Status: Completed Specimen: Urine Updated: 01/07/22 1553     Urine Culture, Routine PROTEUS MIRABILIS  >100,000 cfu/ml      Narrative:      Specimen Source->Urine    Blood culture x two cultures. Draw prior to antibiotics. [[id number]]  (Abnormal) Collected: 01/03/22 1950    Order Status: Completed Specimen: Blood Updated: 01/06/22 1313     Blood Culture, Routine Gram stain peds bottle: Gram positive cocci in clusters resembling Staph      Results called to and read back by:Yvonne Gamez RN 01/04/2022  23:08      COAGULASE-NEGATIVE STAPHYLOCOCCUS SPECIES  Organism is a probable contaminant      Narrative:      Aerobic and anaerobic          Significant Imaging:  CXR:  Worsening airspace opacities in  bilateral lungs, suspicious for multifocal pneumonia.    CXR: Resolving intrapulmonary infiltrates with a small amount of consolidation remaining at the right lung base. Tracheostomy tube in position.    KUB: No acute abdominal process.    CXR: Resolving intrapulmonary infiltrates with a small amount of consolidation remaining at the right lung base. Tracheostomy tube in position.

## 2022-01-09 NOTE — PROGRESS NOTES
[facility] Hospital Medicine  Progress Note    Patient Name: Genna Felix  MRN: [id number]  Patient Class: IP- Inpatient   Admission Date: 1/3/2022  Length of Stay: 6 days  Attending Physician: Geeta Siddiqi MD  Primary Care Provider: Primary Doctor No        Subjective:     Principal Problem:Sepsis        HPI:  Genna Felix is a 61 year old female with a past medical history of anoxic brain injury s/p G tube and tracheostomy, PE, HTN, GERD who presented from [place] for further evaluation of tachycardia.  She was discharged from this facility on 12/27/21 with a diagnosis of pneumonia with rx for bactrim.  In the ED she is found to be tachycardic with WBC 14K, lactic acid 2.6 and evidence of UTI.  CXR reveals worsening multifocal pneumonia.  Pt is nonverbal and cannot contribute to history of present illness, therefore further information is limited.  While in the ED, IV zosyn was initiated and she was give an IVF bolus.  She will be admitted to the service of hospital medicine for continued monitoring and treatment.      Overview/Hospital Course:  61-year-old female with past medical history of anoxic brain injury, PE, hypertension, GERD admitted to the hospital medicine service for UTI and worsening multifocal pneumonia.  She was placed on broad-spectrum antibiotics.  Pulmonologist was consulted.  Urine culture grew presumptive Proteus.  Sputum culture currently with few Gram-negative rods.      Interval History: Pulmonology following. Sputum culture grew Pseudomonas, Klebsiella pneumoniae (ESBL species) and Serratia species. Blood culture with CoNS- contaminant.  Urine with Proteus species.  Peg tube feeding at goal.  Occasional use of warming blanket for hypothermia.    Review of Systems   Unable to perform ROS: Patient nonverbal     Objective:     Vital Signs (Most Recent):  Temp: 96.26 °F (35.7 °C) (01/09/22 0718)  Pulse: 92 (01/09/22 0718)  Resp: (!) 26 (01/09/22 0718)  BP: (!) 102/57  (01/09/22 0600)  SpO2: 95 % (01/09/22 0718) Vital Signs (24h Range):  Temp:  [95.9 °F (35.5 °C)-97.7 °F (36.5 °C)] 96.26 °F (35.7 °C)  Pulse:  [] 92  Resp:  [24-62] 26  SpO2:  [92 %-100 %] 95 %  BP: ()/() 102/57     Weight: 72.6 kg (160 lb 0.9 oz)  Body mass index is 31.26 kg/m².    Intake/Output Summary (Last 24 hours) at 1/9/2022 1026  Last data filed at 1/9/2022 0530  Gross per 24 hour   Intake 800 ml   Output 2325 ml   Net -1525 ml      Physical Exam  Vitals and nursing note reviewed.   Constitutional:       General: She is not in acute distress.     Appearance: She is well-developed. She is ill-appearing.      Comments: Chronic trach/vent dependent   HENT:      Head: Normocephalic and atraumatic.      Mouth/Throat:      Mouth: Mucous membranes are dry.   Cardiovascular:      Rate and Rhythm: Normal rate and regular rhythm.   Pulmonary:      Effort: Pulmonary effort is normal. No respiratory distress.      Breath sounds: Rhonchi present.   Abdominal:      General: Bowel sounds are normal.      Palpations: Abdomen is soft.      Tenderness: There is no abdominal tenderness.      Comments: g tube in place   Musculoskeletal:      Cervical back: Normal range of motion and neck supple.      Comments: Contractures to hands   Skin:     General: Skin is warm and dry.   Neurological:      GCS: GCS eye subscore is 1. GCS verbal subscore is 1. GCS motor subscore is 1.   Psychiatric:      Comments: Unable to assess         Significant Labs:  Lab Results   Component Value Date    WBC 4.91 01/06/2022    HGB 8.2 (L) 01/06/2022    HCT 26.9 (L) 01/06/2022    MCV 92 01/06/2022     01/06/2022       CMP  Sodium   Date Value Ref Range Status   01/09/2022 134 (L) 136 - 145 mmol/L Final     Potassium   Date Value Ref Range Status   01/09/2022 3.8 3.5 - 5.1 mmol/L Final     Chloride   Date Value Ref Range Status   01/09/2022 105 95 - 110 mmol/L Final     CO2   Date Value Ref Range Status   01/09/2022 19 (L) 23 -  29 mmol/L Final     Glucose   Date Value Ref Range Status   01/09/2022 110 70 - 110 mg/dL Final     BUN   Date Value Ref Range Status   01/09/2022 7 6 - 20 mg/dL Final     Creatinine   Date Value Ref Range Status   01/09/2022 0.5 0.5 - 1.4 mg/dL Final     Calcium   Date Value Ref Range Status   01/09/2022 8.5 (L) 8.7 - 10.5 mg/dL Final     Total Protein   Date Value Ref Range Status   01/09/2022 6.0 6.0 - 8.4 g/dL Final     Albumin   Date Value Ref Range Status   01/09/2022 1.9 (L) 3.5 - 5.2 g/dL Final     Total Bilirubin   Date Value Ref Range Status   01/09/2022 1.2 (H) 0.1 - 1.0 mg/dL Final     Comment:     For infants and newborns, interpretation of results should be based  on gestational age, weight and in agreement with clinical  observations.    Premature Infant recommended reference ranges:  Up to 24 hours.............<8.0 mg/dL  Up to 48 hours............<12.0 mg/dL  3-5 days..................<15.0 mg/dL  6-29 days.................<15.0 mg/dL       Alkaline Phosphatase   Date Value Ref Range Status   01/09/2022 138 (H) 55 - 135 U/L Final     AST   Date Value Ref Range Status   01/09/2022 13 10 - 40 U/L Final     ALT   Date Value Ref Range Status   01/09/2022 20 10 - 44 U/L Final     Anion Gap   Date Value Ref Range Status   01/09/2022 10 8 - 16 mmol/L Final     eGFR if    Date Value Ref Range Status   01/09/2022 >60 >60 mL/min/1.73 m^2 Final     eGFR if non    Date Value Ref Range Status   01/09/2022 >60 >60 mL/min/1.73 m^2 Final     Comment:     Calculation used to obtain the estimated glomerular filtration  rate (eGFR) is the CKD-EPI equation.        Microbiology Results (last 7 days)     Procedure Component Value Units Date/Time    Blood culture x two cultures. Draw prior to antibiotics. [[id number]] Collected: 01/03/22 1950    Order Status: Completed Specimen: Blood Updated: 01/09/22 0612     Blood Culture, Routine No growth after 5 days.    Narrative:      Aerobic and  anaerobic    Culture, Respiratory with Gram Stain [[id number]]  (Abnormal)  (Susceptibility) Collected: 01/05/22 2009    Order Status: Completed Specimen: Respiratory from Endotracheal Aspirate Updated: 01/08/22 1318     Respiratory Culture No S aureus isolated.      SERRATIA MARCESCENS  Many        KLEBSIELLA PNEUMONIAE ESBL  Moderate        PRESUMPTIVE PSEUDOMONAS SPECIES  Moderate  Identification and susceptibility pending       Gram Stain (Respiratory) <10 epithelial cells per low power field.     Gram Stain (Respiratory) Rare WBC's     Gram Stain (Respiratory) Few Gram negative rods     Gram Stain (Respiratory) Rare yeast    Urine culture [[id number]]  (Abnormal)  (Susceptibility) Collected: 01/03/22 1943    Order Status: Completed Specimen: Urine Updated: 01/07/22 1553     Urine Culture, Routine PROTEUS MIRABILIS  >100,000 cfu/ml      Narrative:      Specimen Source->Urine    Blood culture x two cultures. Draw prior to antibiotics. [[id number]]  (Abnormal) Collected: 01/03/22 1950    Order Status: Completed Specimen: Blood Updated: 01/06/22 1313     Blood Culture, Routine Gram stain peds bottle: Gram positive cocci in clusters resembling Staph      Results called to and read back by:Yvonne Gamez RN 01/04/2022  23:08      COAGULASE-NEGATIVE STAPHYLOCOCCUS SPECIES  Organism is a probable contaminant      Narrative:      Aerobic and anaerobic          Significant Imaging:  CXR:  Worsening airspace opacities in bilateral lungs, suspicious for multifocal pneumonia.    CXR: Resolving intrapulmonary infiltrates with a small amount of consolidation remaining at the right lung base. Tracheostomy tube in position.    KUB: No acute abdominal process.    CXR: Resolving intrapulmonary infiltrates with a small amount of consolidation remaining at the right lung base. Tracheostomy tube in position.        Assessment/Plan:      * Sepsis  This patient does have evidence of infective focus  My overall impression is sepsis. Vital  "signs were reviewed and noted in progress note.  Antibiotics given-   Antibiotics (From admission, onward)            Start     Stop Route Frequency Ordered    01/06/22 0630  vancomycin 1.25 g in dextrose 5% 250 mL IVPB (ready to mix)         -- IV Every 18 hours 01/06/22 0611    01/04/22 0900  mupirocin 2 % ointment         01/09 0859 Nasl 2 times daily 01/04/22 0142    01/04/22 0400  piperacillin-tazobactam 4.5 g in dextrose 5 % 100 mL IVPB (ready to mix system)         -- IV Every 8 hours (non-standard times) 01/03/22 2141    01/03/22 2241  vancomycin - pharmacy to dose  (vancomycin IVPB)        "And" Linked Group Details    -- IV pharmacy to manage frequency 01/03/22 2141        Cultures were taken-   Microbiology Results (last 7 days)     Procedure Component Value Units Date/Time    Culture, Respiratory with Gram Stain [[id number]]  (Abnormal) Collected: 01/05/22 2009    Order Status: Completed Specimen: Respiratory from Endotracheal Aspirate Updated: 01/07/22 1035     Respiratory Culture GRAM NEGATIVE CHRISTINA  Many  Identification and susceptibility pending       Gram Stain (Respiratory) <10 epithelial cells per low power field.     Gram Stain (Respiratory) Rare WBC's     Gram Stain (Respiratory) Few Gram negative rods     Gram Stain (Respiratory) Rare yeast    Blood culture x two cultures. Draw prior to antibiotics. [[id number]] Collected: 01/03/22 1950    Order Status: Completed Specimen: Blood Updated: 01/07/22 0612     Blood Culture, Routine No Growth to date      No Growth to date      No Growth to date      No Growth to date    Narrative:      Aerobic and anaerobic    Urine culture [[id number]]  (Abnormal)  (Susceptibility) Collected: 01/03/22 1943    Order Status: Completed Specimen: Urine Updated: 01/06/22 1535     Urine Culture, Routine PROTEUS MIRABILIS  >100,000 cfu/ml  Identification and susceptibility pending      Narrative:      Specimen Source->Urine    Blood culture x two cultures. Draw prior to " antibiotics. [[id number]]  (Abnormal) Collected: 01/03/22 1950    Order Status: Completed Specimen: Blood Updated: 01/06/22 1313     Blood Culture, Routine Gram stain peds bottle: Gram positive cocci in clusters resembling Staph      Results called to and read back by:Yvonne Gamez RN 01/04/2022  23:08      COAGULASE-NEGATIVE STAPHYLOCOCCUS SPECIES  Organism is a probable contaminant      Narrative:      Aerobic and anaerobic        Latest lactate reviewed, they are-  No results for input(s): LACTATE in the last 72 hours.    Organ dysfunction indicated by tachycardia, tachypnea  Source- respiratory, urine      Source control Achieved by- IV abx      Hyponatremia  Follow BMP.       UTI (urinary tract infection)  IV meropenem; follow culture        Pressure injury of left buttock, stage 4  Consult wound care  Turn q2h        Ventilator associated pneumonia  Failed OP abx  Pt vent dependent-consult pulmonology for vent management  On intravenous meropenem and vancomycin.  Blood and sputum cx  Nebulizer treatments        Gastroesophageal reflux disease  Chronic; utilize pepcid acutely.      Essential hypertension  Chronic, stable.  Latest blood pressure and vitals reviewed-   Temp:  [96.08 °F (35.6 °C)-98.24 °F (36.8 °C)]   Pulse:  []   Resp:  [10-75]   BP: (113-160)/(62-98)   SpO2:  [94 %-99 %] .   Home meds for hypertension were reviewed and noted below.   No chronic antihypertensives noted. Review of home meds reveals midodrine which has been reordered.      Will utilize p.r.n. blood pressure medication only if patient's blood pressure greater than  180/110 and she develops symptoms such as worsening chest pain or shortness of breath.        Malnutrition of moderate degree  Nutrition consult.      Chronic respiratory failure with hypoxia  Patient with Hypoxic Respiratory failure which is Chronic.  she is vent dependent. Supplemental oxygen was provided and noted- Vent Mode: A/CMV-VC  Oxygen Concentration (%):   [35] 35  Resp Rate Total:  [24 br/min-43 br/min] 24 br/min  Vt Set:  [350 mL] 350 mL  PEEP/CPAP:  [4.3 cmH20-8.2 cmH20] 5.3 cmH20.   Signs/symptoms of respiratory failure include- tachypnea and increased work of breathing. Contributing diagnoses includes - Aspiration and Pneumonia Labs and images were reviewed. Patient Has not had a recent ABG. Will treat underlying causes.    PEG (percutaneous endoscopic gastrostomy) status  Noted; care per nursing.  TF per nutrition recs.  Slowly advance PEG tube feeding.        Tracheostomy dependence  Noted; care per nursing      Anoxic brain injury  Noted.  Turn q2h      Anemia of chronic disease  Patient's anemia is currently controlled. S/p 0 units of PRBCs.  Current CBC reviewed-   Lab Results   Component Value Date    HGB 8.2 (L) 01/06/2022    HCT 26.9 (L) 01/06/2022    MCV 92 01/06/2022     01/06/2022     Monitor serial CBC and transfuse if patient becomes hemodynamically unstable, symptomatic or H/H drops below 7/21.           VTE Risk Mitigation (From admission, onward)         Ordered     enoxaparin injection 40 mg  Daily         01/04/22 0034     IP VTE HIGH RISK PATIENT  Once         01/04/22 0034     Place sequential compression device  Until discontinued         01/04/22 0034                Discharge Planning   [place]: 1/9/2022     Code Status: Full Code   Is the patient medically ready for discharge?:     Reason for patient still in hospital (select all that apply): Patient trending condition  Discharge Plan A: Return to nursing home      Critical care time spent on the evaluation and treatment of severe organ dysfunction, review of pertinent labs and imaging studies, discussions with consulting providers and discussions with patient/family: 31 minutes.      Geeta Siddiqi MD  Department of Hospital Medicine   [facility]

## 2022-01-10 LAB
ALBUMIN SERPL BCP-MCNC: 1.8 G/DL (ref 3.5–5.2)
ALLENS TEST: ABNORMAL
ALP SERPL-CCNC: 141 U/L (ref 55–135)
ALT SERPL W/O P-5'-P-CCNC: 18 U/L (ref 10–44)
ANION GAP SERPL CALC-SCNC: 9 MMOL/L (ref 8–16)
AST SERPL-CCNC: 15 U/L (ref 10–40)
BILIRUB SERPL-MCNC: 0.9 MG/DL (ref 0.1–1)
BUN SERPL-MCNC: 7 MG/DL (ref 6–20)
CALCIUM SERPL-MCNC: 8.2 MG/DL (ref 8.7–10.5)
CHLORIDE SERPL-SCNC: 108 MMOL/L (ref 95–110)
CO2 SERPL-SCNC: 20 MMOL/L (ref 23–29)
CREAT SERPL-MCNC: 0.4 MG/DL (ref 0.5–1.4)
DELSYS: ABNORMAL
ERYTHROCYTE [SEDIMENTATION RATE] IN BLOOD BY WESTERGREN METHOD: 24 MM/H
EST. GFR  (AFRICAN AMERICAN): >60 ML/MIN/1.73 M^2
EST. GFR  (NON AFRICAN AMERICAN): >60 ML/MIN/1.73 M^2
FIO2: 50
GLUCOSE SERPL-MCNC: 68 MG/DL (ref 70–110)
HCO3 UR-SCNC: 23.9 MMOL/L (ref 24–28)
MAGNESIUM SERPL-MCNC: 1.7 MG/DL (ref 1.6–2.6)
MIN VOL: 9.5
MODE: ABNORMAL
PCO2 BLDA: 43.7 MMHG (ref 35–45)
PEEP: 5
PH SMN: 7.35 [PH] (ref 7.35–7.45)
PHOSPHATE SERPL-MCNC: 3 MG/DL (ref 2.7–4.5)
PIP: 37
PO2 BLDA: 84 MMHG (ref 80–100)
POC BE: -2 MMOL/L
POC SATURATED O2: 96 % (ref 95–100)
POC TCO2: 25 MMOL/L (ref 23–27)
POCT GLUCOSE: 120 MG/DL (ref 70–110)
POCT GLUCOSE: 78 MG/DL (ref 70–110)
POCT GLUCOSE: 85 MG/DL (ref 70–110)
POCT GLUCOSE: 86 MG/DL (ref 70–110)
POTASSIUM SERPL-SCNC: 3.8 MMOL/L (ref 3.5–5.1)
PROT SERPL-MCNC: 5.9 G/DL (ref 6–8.4)
SAMPLE: ABNORMAL
SITE: ABNORMAL
SODIUM SERPL-SCNC: 137 MMOL/L (ref 136–145)
SP02: 97
VT: 350

## 2022-01-10 PROCEDURE — 99900035 HC TECH TIME PER 15 MIN (STAT)

## 2022-01-10 PROCEDURE — 25000003 PHARM REV CODE 250: Performed by: SURGERY

## 2022-01-10 PROCEDURE — 99233 PR SUBSEQUENT HOSPITAL CARE,LEVL III: ICD-10-PCS | Mod: ,,, | Performed by: INTERNAL MEDICINE

## 2022-01-10 PROCEDURE — 94003 VENT MGMT INPAT SUBQ DAY: CPT

## 2022-01-10 PROCEDURE — 83735 ASSAY OF MAGNESIUM: CPT | Performed by: NURSE PRACTITIONER

## 2022-01-10 PROCEDURE — 84100 ASSAY OF PHOSPHORUS: CPT | Performed by: NURSE PRACTITIONER

## 2022-01-10 PROCEDURE — 36600 WITHDRAWAL OF ARTERIAL BLOOD: CPT

## 2022-01-10 PROCEDURE — 63600175 PHARM REV CODE 636 W HCPCS: Performed by: SURGERY

## 2022-01-10 PROCEDURE — 94640 AIRWAY INHALATION TREATMENT: CPT

## 2022-01-10 PROCEDURE — 20000000 HC ICU ROOM

## 2022-01-10 PROCEDURE — 25000003 PHARM REV CODE 250: Performed by: NURSE PRACTITIONER

## 2022-01-10 PROCEDURE — 27000207 HC ISOLATION

## 2022-01-10 PROCEDURE — A9698 NON-RAD CONTRAST MATERIALNOC: HCPCS

## 2022-01-10 PROCEDURE — 25000242 PHARM REV CODE 250 ALT 637 W/ HCPCS: Performed by: NURSE PRACTITIONER

## 2022-01-10 PROCEDURE — 63600175 PHARM REV CODE 636 W HCPCS: Performed by: NURSE PRACTITIONER

## 2022-01-10 PROCEDURE — 25000003 PHARM REV CODE 250: Performed by: INTERNAL MEDICINE

## 2022-01-10 PROCEDURE — 99900022

## 2022-01-10 PROCEDURE — 25500020 PHARM REV CODE 255

## 2022-01-10 PROCEDURE — 99233 SBSQ HOSP IP/OBS HIGH 50: CPT | Mod: ,,, | Performed by: INTERNAL MEDICINE

## 2022-01-10 PROCEDURE — 82803 BLOOD GASES ANY COMBINATION: CPT

## 2022-01-10 PROCEDURE — 63600175 PHARM REV CODE 636 W HCPCS: Performed by: HOSPITALIST

## 2022-01-10 PROCEDURE — 99900026 HC AIRWAY MAINTENANCE (STAT)

## 2022-01-10 PROCEDURE — S5010 5% DEXTROSE AND 0.45% SALINE: HCPCS | Performed by: INTERNAL MEDICINE

## 2022-01-10 PROCEDURE — 80053 COMPREHEN METABOLIC PANEL: CPT | Performed by: NURSE PRACTITIONER

## 2022-01-10 PROCEDURE — 63600175 PHARM REV CODE 636 W HCPCS: Performed by: INTERNAL MEDICINE

## 2022-01-10 PROCEDURE — 25000003 PHARM REV CODE 250: Performed by: HOSPITALIST

## 2022-01-10 PROCEDURE — 27000221 HC OXYGEN, UP TO 24 HOURS

## 2022-01-10 PROCEDURE — 94761 N-INVAS EAR/PLS OXIMETRY MLT: CPT

## 2022-01-10 RX ORDER — DEXTROSE MONOHYDRATE AND SODIUM CHLORIDE 5; .45 G/100ML; G/100ML
INJECTION, SOLUTION INTRAVENOUS CONTINUOUS
Status: DISCONTINUED | OUTPATIENT
Start: 2022-01-10 | End: 2022-01-15

## 2022-01-10 RX ADMIN — MIDODRINE HYDROCHLORIDE 10 MG: 5 TABLET ORAL at 09:01

## 2022-01-10 RX ADMIN — IPRATROPIUM BROMIDE AND ALBUTEROL SULFATE 3 ML: 2.5; .5 SOLUTION RESPIRATORY (INHALATION) at 03:01

## 2022-01-10 RX ADMIN — IPRATROPIUM BROMIDE AND ALBUTEROL SULFATE 3 ML: 2.5; .5 SOLUTION RESPIRATORY (INHALATION) at 04:01

## 2022-01-10 RX ADMIN — MEROPENEM 2 G: 1 INJECTION, POWDER, FOR SOLUTION INTRAVENOUS at 03:01

## 2022-01-10 RX ADMIN — CHLORHEXIDINE GLUCONATE 0.12% ORAL RINSE 15 ML: 1.2 LIQUID ORAL at 09:01

## 2022-01-10 RX ADMIN — IPRATROPIUM BROMIDE AND ALBUTEROL SULFATE 3 ML: 2.5; .5 SOLUTION RESPIRATORY (INHALATION) at 08:01

## 2022-01-10 RX ADMIN — SODIUM CHLORIDE: 0.9 INJECTION, SOLUTION INTRAVENOUS at 09:01

## 2022-01-10 RX ADMIN — FAMOTIDINE 20 MG: 10 INJECTION INTRAVENOUS at 09:01

## 2022-01-10 RX ADMIN — IPRATROPIUM BROMIDE AND ALBUTEROL SULFATE 3 ML: 2.5; .5 SOLUTION RESPIRATORY (INHALATION) at 11:01

## 2022-01-10 RX ADMIN — MAGNESIUM SULFATE 2 G: 2 INJECTION INTRAVENOUS at 04:01

## 2022-01-10 RX ADMIN — MEROPENEM 2 G: 1 INJECTION, POWDER, FOR SOLUTION INTRAVENOUS at 06:01

## 2022-01-10 RX ADMIN — ENOXAPARIN SODIUM 40 MG: 40 INJECTION SUBCUTANEOUS at 05:01

## 2022-01-10 RX ADMIN — DEXTROSE AND SODIUM CHLORIDE: 5; .45 INJECTION, SOLUTION INTRAVENOUS at 05:01

## 2022-01-10 RX ADMIN — IOHEXOL 75 ML: 350 INJECTION, SOLUTION INTRAVENOUS at 05:01

## 2022-01-10 RX ADMIN — IPRATROPIUM BROMIDE AND ALBUTEROL SULFATE 3 ML: 2.5; .5 SOLUTION RESPIRATORY (INHALATION) at 07:01

## 2022-01-10 RX ADMIN — POTASSIUM BICARBONATE 50 MEQ: 977.5 TABLET, EFFERVESCENT ORAL at 04:01

## 2022-01-10 RX ADMIN — MORPHINE SULFATE 2 MG: 2 INJECTION, SOLUTION INTRAMUSCULAR; INTRAVENOUS at 06:01

## 2022-01-10 RX ADMIN — MEROPENEM 2 G: 1 INJECTION, POWDER, FOR SOLUTION INTRAVENOUS at 12:01

## 2022-01-10 RX ADMIN — IPRATROPIUM BROMIDE AND ALBUTEROL SULFATE 3 ML: 2.5; .5 SOLUTION RESPIRATORY (INHALATION) at 12:01

## 2022-01-10 RX ADMIN — IOHEXOL 1000 ML: 9 SOLUTION ORAL at 08:01

## 2022-01-10 NOTE — PLAN OF CARE
Patient still requiring occasional use of bear hugger to regulate tempeture. Pulmonology following.       01/10/22 1308   Discharge Reassessment   Assessment Type Discharge Planning Reassessment   Discharge Plan A Return to nursing home   Discharge Plan B Return to Nursing Home

## 2022-01-10 NOTE — SUBJECTIVE & OBJECTIVE
Interval History: Pulmonology following. Sputum culture grew Pseudomonas, Klebsiella pneumoniae (ESBL species) and Serratia species. Blood culture with CoNS- contaminant.  Urine with Proteus species.  Peg tube feeding at goal.  Significant gastric residual noted.  Pulmonary secretions suctioning gastric contents.  Tube feed is being held.    Review of Systems   Unable to perform ROS: Patient nonverbal     Objective:     Vital Signs (Most Recent):  Temp: 97.7 °F (36.5 °C) (01/10/22 0728)  Pulse: 96 (01/10/22 0728)  Resp: (!) 30 (01/10/22 0728)  BP: 113/61 (01/10/22 0600)  SpO2: 97 % (01/10/22 0728) Vital Signs (24h Range):  Temp:  [95.9 °F (35.5 °C)-100.22 °F (37.9 °C)] 97.7 °F (36.5 °C)  Pulse:  [] 96  Resp:  [24-42] 30  SpO2:  [92 %-100 %] 97 %  BP: ()/(55-84) 113/61     Weight: 72.3 kg (159 lb 6.3 oz)  Body mass index is 31.13 kg/m².    Intake/Output Summary (Last 24 hours) at 1/10/2022 0955  Last data filed at 1/10/2022 0600  Gross per 24 hour   Intake 5945.28 ml   Output 2225 ml   Net 3720.28 ml      Physical Exam  Vitals and nursing note reviewed.   Constitutional:       General: She is not in acute distress.     Appearance: She is well-developed. She is ill-appearing.      Comments: Chronic trach/vent dependent   HENT:      Head: Normocephalic and atraumatic.      Mouth/Throat:      Mouth: Mucous membranes are dry.   Cardiovascular:      Rate and Rhythm: Normal rate and regular rhythm.   Pulmonary:      Effort: Pulmonary effort is normal. No respiratory distress.      Breath sounds: Rhonchi present.   Abdominal:      General: Bowel sounds are normal.      Palpations: Abdomen is soft.      Tenderness: There is no abdominal tenderness.      Comments: g tube in place   Musculoskeletal:      Cervical back: Normal range of motion and neck supple.      Comments: Contractures to hands   Skin:     General: Skin is warm and dry.   Neurological:      GCS: GCS eye subscore is 1. GCS verbal subscore is 1. GCS  motor subscore is 1.   Psychiatric:      Comments: Unable to assess         Significant Labs:  Lab Results   Component Value Date    WBC 4.91 01/06/2022    HGB 8.2 (L) 01/06/2022    HCT 26.9 (L) 01/06/2022    MCV 92 01/06/2022     01/06/2022       CMP  Sodium   Date Value Ref Range Status   01/10/2022 137 136 - 145 mmol/L Final     Potassium   Date Value Ref Range Status   01/10/2022 3.8 3.5 - 5.1 mmol/L Final     Chloride   Date Value Ref Range Status   01/10/2022 108 95 - 110 mmol/L Final     CO2   Date Value Ref Range Status   01/10/2022 20 (L) 23 - 29 mmol/L Final     Glucose   Date Value Ref Range Status   01/10/2022 68 (L) 70 - 110 mg/dL Final     BUN   Date Value Ref Range Status   01/10/2022 7 6 - 20 mg/dL Final     Creatinine   Date Value Ref Range Status   01/10/2022 0.4 (L) 0.5 - 1.4 mg/dL Final     Calcium   Date Value Ref Range Status   01/10/2022 8.2 (L) 8.7 - 10.5 mg/dL Final     Total Protein   Date Value Ref Range Status   01/10/2022 5.9 (L) 6.0 - 8.4 g/dL Final     Albumin   Date Value Ref Range Status   01/10/2022 1.8 (L) 3.5 - 5.2 g/dL Final     Total Bilirubin   Date Value Ref Range Status   01/10/2022 0.9 0.1 - 1.0 mg/dL Final     Comment:     For infants and newborns, interpretation of results should be based  on gestational age, weight and in agreement with clinical  observations.    Premature Infant recommended reference ranges:  Up to 24 hours.............<8.0 mg/dL  Up to 48 hours............<12.0 mg/dL  3-5 days..................<15.0 mg/dL  6-29 days.................<15.0 mg/dL       Alkaline Phosphatase   Date Value Ref Range Status   01/10/2022 141 (H) 55 - 135 U/L Final     AST   Date Value Ref Range Status   01/10/2022 15 10 - 40 U/L Final     ALT   Date Value Ref Range Status   01/10/2022 18 10 - 44 U/L Final     Anion Gap   Date Value Ref Range Status   01/10/2022 9 8 - 16 mmol/L Final     eGFR if    Date Value Ref Range Status   01/10/2022 >60 >60  mL/min/1.73 m^2 Final     eGFR if non    Date Value Ref Range Status   01/10/2022 >60 >60 mL/min/1.73 m^2 Final     Comment:     Calculation used to obtain the estimated glomerular filtration  rate (eGFR) is the CKD-EPI equation.        Microbiology Results (last 7 days)     Procedure Component Value Units Date/Time    Culture, Respiratory with Gram Stain [[id number]]  (Abnormal)  (Susceptibility) Collected: 01/05/22 2009    Order Status: Completed Specimen: Respiratory from Endotracheal Aspirate Updated: 01/10/22 0737     Respiratory Culture No S aureus isolated.      SERRATIA MARCESCENS  Many        KLEBSIELLA PNEUMONIAE ESBL  Moderate        PSEUDOMONAS AERUGINOSA   Moderate       Gram Stain (Respiratory) <10 epithelial cells per low power field.     Gram Stain (Respiratory) Rare WBC's     Gram Stain (Respiratory) Few Gram negative rods     Gram Stain (Respiratory) Rare yeast    Blood culture x two cultures. Draw prior to antibiotics. [[id number]] Collected: 01/03/22 1950    Order Status: Completed Specimen: Blood Updated: 01/09/22 0612     Blood Culture, Routine No growth after 5 days.    Narrative:      Aerobic and anaerobic    Urine culture [[id number]]  (Abnormal)  (Susceptibility) Collected: 01/03/22 1943    Order Status: Completed Specimen: Urine Updated: 01/07/22 1553     Urine Culture, Routine PROTEUS MIRABILIS  >100,000 cfu/ml      Narrative:      Specimen Source->Urine    Blood culture x two cultures. Draw prior to antibiotics. [[id number]]  (Abnormal) Collected: 01/03/22 1950    Order Status: Completed Specimen: Blood Updated: 01/06/22 1313     Blood Culture, Routine Gram stain peds bottle: Gram positive cocci in clusters resembling Staph      Results called to and read back by:Yvonne Gamez RN 01/04/2022  23:08      COAGULASE-NEGATIVE STAPHYLOCOCCUS SPECIES  Organism is a probable contaminant      Narrative:      Aerobic and anaerobic          Significant Imaging:  CXR:  Worsening  airspace opacities in bilateral lungs, suspicious for multifocal pneumonia.    CXR: Resolving intrapulmonary infiltrates with a small amount of consolidation remaining at the right lung base. Tracheostomy tube in position.    KUB: No acute abdominal process.    CXR: Resolving intrapulmonary infiltrates with a small amount of consolidation remaining at the right lung base. Tracheostomy tube in position.    CXR: Right lung infiltrate without significant change.    KUB:  No acute abdominal process.

## 2022-01-10 NOTE — PROGRESS NOTES
Progress Note  PULMONARY    Admit Date: 1/3/2022   01/10/2022      SUBJECTIVE:     1/5- no new issues  1/6- no new issues  1/7- remains on vent, GNR on sputum culture, CXR better. Per RN multiple episodes emesis so tube feeds are held  1/8- no new issues  1/9- no new issues  1/10- no new issues      OBJECTIVE:     Vitals (Most recent):  Vitals:    01/10/22 0728   BP:    Pulse: 96   Resp: (!) 30   Temp: 97.7 °F (36.5 °C)       Vitals (24 hour range):  Temp:  [95.9 °F (35.5 °C)-100.22 °F (37.9 °C)]   Pulse:  []   Resp:  [24-42]   BP: ()/(55-84)   SpO2:  [92 %-100 %]       Intake/Output Summary (Last 24 hours) at 1/10/2022 0942  Last data filed at 1/10/2022 0600  Gross per 24 hour   Intake 5945.28 ml   Output 2225 ml   Net 3720.28 ml          Physical Exam:  The patient's neuro status (alertness,orientation,cognitive function,motor skills,), pharyngeal exam (oral lesions, hygiene, abn dentition,), Neck (jvd,mass,thyroid,nodes in neck and above/below clavicle),RESPIRATORY(symmetry,effort,fremitus,percussion,auscultation),  Cor(rhythm,heart tones including gallops,perfusion,edema)ABD(distention,hepatic&splenomegaly,tenderness,masses), Skin(rash,cyanosis),Psyc(affect,judgement,).  Exam negative except for these pertinent findings:    Unresponsive, eyes open and deviate to right, grimaces, no distress  Trach w vent  Bilateral coarse breath sounds  Abdomen soft, distended, hypoactive bowel sounds, PEG in place  Contracted upper ext  L hand dressed        Radiographs reviewed: view by direct vision   CXR 1/9- worsened appearance R mid/lower lung infiltrates  CXR 1/7- improved infiltrates  CXR 1/3/22- bilateral lower lobe infiltrates, worsened compared to previous film    Labs     No results for input(s): WBC, HGB, HCT, PLT, BAND, METAMYELOCYT, MYELOPCT, HGBA1C in the last 24 hours.  Recent Labs   Lab 01/10/22  0252      K 3.8      CO2 20*   BUN 7   CREATININE 0.4*   GLU 68*   CALCIUM 8.2*   MG 1.7    PHOS 3.0   AST 15   ALT 18   ALKPHOS 141*   BILITOT 0.9   PROT 5.9*   ALBUMIN 1.8*   No results for input(s): PH, PCO2, PO2, HCO3 in the last 24 hours.  Microbiology Results (last 7 days)     Procedure Component Value Units Date/Time    Culture, Respiratory with Gram Stain [[id number]]  (Abnormal)  (Susceptibility) Collected: 01/05/22 2009    Order Status: Completed Specimen: Respiratory from Endotracheal Aspirate Updated: 01/10/22 0737     Respiratory Culture No S aureus isolated.      SERRATIA MARCESCENS  Many        KLEBSIELLA PNEUMONIAE ESBL  Moderate        PSEUDOMONAS AERUGINOSA   Moderate       Gram Stain (Respiratory) <10 epithelial cells per low power field.     Gram Stain (Respiratory) Rare WBC's     Gram Stain (Respiratory) Few Gram negative rods     Gram Stain (Respiratory) Rare yeast    Blood culture x two cultures. Draw prior to antibiotics. [[id number]] Collected: 01/03/22 1950    Order Status: Completed Specimen: Blood Updated: 01/09/22 0612     Blood Culture, Routine No growth after 5 days.    Narrative:      Aerobic and anaerobic    Urine culture [[id number]]  (Abnormal)  (Susceptibility) Collected: 01/03/22 1943    Order Status: Completed Specimen: Urine Updated: 01/07/22 1553     Urine Culture, Routine PROTEUS MIRABILIS  >100,000 cfu/ml      Narrative:      Specimen Source->Urine    Blood culture x two cultures. Draw prior to antibiotics. [[id number]]  (Abnormal) Collected: 01/03/22 1950    Order Status: Completed Specimen: Blood Updated: 01/06/22 1313     Blood Culture, Routine Gram stain peds bottle: Gram positive cocci in clusters resembling Staph      Results called to and read back by:Yvonne Gamez RN 01/04/2022  23:08      COAGULASE-NEGATIVE STAPHYLOCOCCUS SPECIES  Organism is a probable contaminant      Narrative:      Aerobic and anaerobic          Impression:  Active Hospital Problems    Diagnosis  POA    *Sepsis [A41.9]  Yes    Hyponatremia [E87.1]  Yes    UTI (urinary tract  infection) [N39.0]  Yes    Ventilator associated pneumonia [J95.851]  Yes    Essential hypertension [I10]  Yes    Gastroesophageal reflux disease [K21.9]  Yes    Malnutrition of moderate degree [E44.0]  Yes    Chronic respiratory failure with hypoxia [J96.11]  Yes    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  Added automatically from request for surgery [id number]        Resolved Hospital Problems   No resolved problems to display.               Plan:     - continue vent, no weaning  - continue meropenem  - lovenox subq for DVT prophylaxis  - pepcid for GI prophylaxis  - wound care   - continue midodrine  - monitor Hgb  - tube feeding continue    Dora Montesinos MD  Pulmonary & Critical Care Medicine                            .

## 2022-01-10 NOTE — PROGRESS NOTES
[facility] Hospital Medicine  Progress Note    Patient Name: Genna Felix  MRN: [id number]  Patient Class: IP- Inpatient   Admission Date: 1/3/2022  Length of Stay: 7 days  Attending Physician: Geeta Siddiqi MD  Primary Care Provider: Primary Doctor No        Subjective:     Principal Problem:Sepsis        HPI:  Genna Felix is a 61 year old female with a past medical history of anoxic brain injury s/p G tube and tracheostomy, PE, HTN, GERD who presented from [place] for further evaluation of tachycardia.  She was discharged from this facility on 12/27/21 with a diagnosis of pneumonia with rx for bactrim.  In the ED she is found to be tachycardic with WBC 14K, lactic acid 2.6 and evidence of UTI.  CXR reveals worsening multifocal pneumonia.  Pt is nonverbal and cannot contribute to history of present illness, therefore further information is limited.  While in the ED, IV zosyn was initiated and she was give an IVF bolus.  She will be admitted to the service of hospital medicine for continued monitoring and treatment.      Overview/Hospital Course:  61-year-old female with past medical history of anoxic brain injury, PE, hypertension, GERD admitted to the hospital medicine service for UTI and worsening multifocal pneumonia.  She was placed on broad-spectrum antibiotics.  Pulmonologist was consulted.  Urine culture grew presumptive Proteus.  Sputum culture currently with few Gram-negative rods.      Interval History: Pulmonology following. Sputum culture grew Pseudomonas, Klebsiella pneumoniae (ESBL species) and Serratia species. Blood culture with CoNS- contaminant.  Urine with Proteus species.  Peg tube feeding at goal.  Significant gastric residual noted.  Pulmonary secretions suctioning gastric contents.  Tube feed is being held.    Review of Systems   Unable to perform ROS: Patient nonverbal     Objective:     Vital Signs (Most Recent):  Temp: 97.7 °F (36.5 °C) (01/10/22 0728)  Pulse: 96  (01/10/22 0728)  Resp: (!) 30 (01/10/22 0728)  BP: 113/61 (01/10/22 0600)  SpO2: 97 % (01/10/22 0728) Vital Signs (24h Range):  Temp:  [95.9 °F (35.5 °C)-100.22 °F (37.9 °C)] 97.7 °F (36.5 °C)  Pulse:  [] 96  Resp:  [24-42] 30  SpO2:  [92 %-100 %] 97 %  BP: ()/(55-84) 113/61     Weight: 72.3 kg (159 lb 6.3 oz)  Body mass index is 31.13 kg/m².    Intake/Output Summary (Last 24 hours) at 1/10/2022 0955  Last data filed at 1/10/2022 0600  Gross per 24 hour   Intake 5945.28 ml   Output 2225 ml   Net 3720.28 ml      Physical Exam  Vitals and nursing note reviewed.   Constitutional:       General: She is not in acute distress.     Appearance: She is well-developed. She is ill-appearing.      Comments: Chronic trach/vent dependent   HENT:      Head: Normocephalic and atraumatic.      Mouth/Throat:      Mouth: Mucous membranes are dry.   Cardiovascular:      Rate and Rhythm: Normal rate and regular rhythm.   Pulmonary:      Effort: Pulmonary effort is normal. No respiratory distress.      Breath sounds: Rhonchi present.   Abdominal:      General: Bowel sounds are normal.      Palpations: Abdomen is soft.      Tenderness: There is no abdominal tenderness.      Comments: g tube in place   Musculoskeletal:      Cervical back: Normal range of motion and neck supple.      Comments: Contractures to hands   Skin:     General: Skin is warm and dry.   Neurological:      GCS: GCS eye subscore is 1. GCS verbal subscore is 1. GCS motor subscore is 1.   Psychiatric:      Comments: Unable to assess         Significant Labs:  Lab Results   Component Value Date    WBC 4.91 01/06/2022    HGB 8.2 (L) 01/06/2022    HCT 26.9 (L) 01/06/2022    MCV 92 01/06/2022     01/06/2022       CMP  Sodium   Date Value Ref Range Status   01/10/2022 137 136 - 145 mmol/L Final     Potassium   Date Value Ref Range Status   01/10/2022 3.8 3.5 - 5.1 mmol/L Final     Chloride   Date Value Ref Range Status   01/10/2022 108 95 - 110 mmol/L Final      CO2   Date Value Ref Range Status   01/10/2022 20 (L) 23 - 29 mmol/L Final     Glucose   Date Value Ref Range Status   01/10/2022 68 (L) 70 - 110 mg/dL Final     BUN   Date Value Ref Range Status   01/10/2022 7 6 - 20 mg/dL Final     Creatinine   Date Value Ref Range Status   01/10/2022 0.4 (L) 0.5 - 1.4 mg/dL Final     Calcium   Date Value Ref Range Status   01/10/2022 8.2 (L) 8.7 - 10.5 mg/dL Final     Total Protein   Date Value Ref Range Status   01/10/2022 5.9 (L) 6.0 - 8.4 g/dL Final     Albumin   Date Value Ref Range Status   01/10/2022 1.8 (L) 3.5 - 5.2 g/dL Final     Total Bilirubin   Date Value Ref Range Status   01/10/2022 0.9 0.1 - 1.0 mg/dL Final     Comment:     For infants and newborns, interpretation of results should be based  on gestational age, weight and in agreement with clinical  observations.    Premature Infant recommended reference ranges:  Up to 24 hours.............<8.0 mg/dL  Up to 48 hours............<12.0 mg/dL  3-5 days..................<15.0 mg/dL  6-29 days.................<15.0 mg/dL       Alkaline Phosphatase   Date Value Ref Range Status   01/10/2022 141 (H) 55 - 135 U/L Final     AST   Date Value Ref Range Status   01/10/2022 15 10 - 40 U/L Final     ALT   Date Value Ref Range Status   01/10/2022 18 10 - 44 U/L Final     Anion Gap   Date Value Ref Range Status   01/10/2022 9 8 - 16 mmol/L Final     eGFR if    Date Value Ref Range Status   01/10/2022 >60 >60 mL/min/1.73 m^2 Final     eGFR if non    Date Value Ref Range Status   01/10/2022 >60 >60 mL/min/1.73 m^2 Final     Comment:     Calculation used to obtain the estimated glomerular filtration  rate (eGFR) is the CKD-EPI equation.        Microbiology Results (last 7 days)     Procedure Component Value Units Date/Time    Culture, Respiratory with Gram Stain [[id number]]  (Abnormal)  (Susceptibility) Collected: 01/05/22 2009    Order Status: Completed Specimen: Respiratory from Endotracheal  Aspirate Updated: 01/10/22 0737     Respiratory Culture No S aureus isolated.      SERRATIA MARCESCENS  Many        KLEBSIELLA PNEUMONIAE ESBL  Moderate        PSEUDOMONAS AERUGINOSA   Moderate       Gram Stain (Respiratory) <10 epithelial cells per low power field.     Gram Stain (Respiratory) Rare WBC's     Gram Stain (Respiratory) Few Gram negative rods     Gram Stain (Respiratory) Rare yeast    Blood culture x two cultures. Draw prior to antibiotics. [[id number]] Collected: 01/03/22 1950    Order Status: Completed Specimen: Blood Updated: 01/09/22 0612     Blood Culture, Routine No growth after 5 days.    Narrative:      Aerobic and anaerobic    Urine culture [[id number]]  (Abnormal)  (Susceptibility) Collected: 01/03/22 1943    Order Status: Completed Specimen: Urine Updated: 01/07/22 1553     Urine Culture, Routine PROTEUS MIRABILIS  >100,000 cfu/ml      Narrative:      Specimen Source->Urine    Blood culture x two cultures. Draw prior to antibiotics. [[id number]]  (Abnormal) Collected: 01/03/22 1950    Order Status: Completed Specimen: Blood Updated: 01/06/22 1313     Blood Culture, Routine Gram stain peds bottle: Gram positive cocci in clusters resembling Staph      Results called to and read back by:Yvonne Gamez RN 01/04/2022  23:08      COAGULASE-NEGATIVE STAPHYLOCOCCUS SPECIES  Organism is a probable contaminant      Narrative:      Aerobic and anaerobic          Significant Imaging:  CXR:  Worsening airspace opacities in bilateral lungs, suspicious for multifocal pneumonia.    CXR: Resolving intrapulmonary infiltrates with a small amount of consolidation remaining at the right lung base. Tracheostomy tube in position.    KUB: No acute abdominal process.    CXR: Resolving intrapulmonary infiltrates with a small amount of consolidation remaining at the right lung base. Tracheostomy tube in position.    CXR: Right lung infiltrate without significant change.    KUB:  No acute abdominal  "process.        Assessment/Plan:      * Sepsis  This patient does have evidence of infective focus  My overall impression is sepsis. Vital signs were reviewed and noted in progress note.  Antibiotics given-   Antibiotics (From admission, onward)            Start     Stop Route Frequency Ordered    01/06/22 0630  vancomycin 1.25 g in dextrose 5% 250 mL IVPB (ready to mix)         -- IV Every 18 hours 01/06/22 0611    01/04/22 0900  mupirocin 2 % ointment         01/09 0859 Nasl 2 times daily 01/04/22 0142    01/04/22 0400  piperacillin-tazobactam 4.5 g in dextrose 5 % 100 mL IVPB (ready to mix system)         -- IV Every 8 hours (non-standard times) 01/03/22 2141    01/03/22 2241  vancomycin - pharmacy to dose  (vancomycin IVPB)        "And" Linked Group Details    -- IV pharmacy to manage frequency 01/03/22 2141        Cultures were taken-   Microbiology Results (last 7 days)     Procedure Component Value Units Date/Time    Culture, Respiratory with Gram Stain [[id number]]  (Abnormal) Collected: 01/05/22 2009    Order Status: Completed Specimen: Respiratory from Endotracheal Aspirate Updated: 01/07/22 1035     Respiratory Culture GRAM NEGATIVE CHRISTINA  Many  Identification and susceptibility pending       Gram Stain (Respiratory) <10 epithelial cells per low power field.     Gram Stain (Respiratory) Rare WBC's     Gram Stain (Respiratory) Few Gram negative rods     Gram Stain (Respiratory) Rare yeast    Blood culture x two cultures. Draw prior to antibiotics. [[id number]] Collected: 01/03/22 1950    Order Status: Completed Specimen: Blood Updated: 01/07/22 0612     Blood Culture, Routine No Growth to date      No Growth to date      No Growth to date      No Growth to date    Narrative:      Aerobic and anaerobic    Urine culture [[id number]]  (Abnormal)  (Susceptibility) Collected: 01/03/22 1943    Order Status: Completed Specimen: Urine Updated: 01/06/22 1535     Urine Culture, Routine PROTEUS MIRABILIS  >100,000 " cfu/ml  Identification and susceptibility pending      Narrative:      Specimen Source->Urine    Blood culture x two cultures. Draw prior to antibiotics. [[id number]]  (Abnormal) Collected: 01/03/22 1950    Order Status: Completed Specimen: Blood Updated: 01/06/22 1313     Blood Culture, Routine Gram stain peds bottle: Gram positive cocci in clusters resembling Staph      Results called to and read back by:Yvonne Gamez RN 01/04/2022  23:08      COAGULASE-NEGATIVE STAPHYLOCOCCUS SPECIES  Organism is a probable contaminant      Narrative:      Aerobic and anaerobic        Latest lactate reviewed, they are-  No results for input(s): LACTATE in the last 72 hours.    Organ dysfunction indicated by tachycardia, tachypnea  Source- respiratory, urine      Source control Achieved by- IV abx      Hyponatremia  Follow BMP.       UTI (urinary tract infection)  IV meropenem; follow culture        Pressure injury of left buttock, stage 4  Consult wound care  Turn q2h        Ventilator associated pneumonia  Failed OP abx  Pt vent dependent-consult pulmonology for vent management  On intravenous meropenem and vancomycin.  Blood and sputum cx  Nebulizer treatments        Gastroesophageal reflux disease  Chronic; utilize pepcid acutely.      Essential hypertension  Chronic, stable.  Latest blood pressure and vitals reviewed-   Temp:  [96.08 °F (35.6 °C)-98.24 °F (36.8 °C)]   Pulse:  []   Resp:  [10-75]   BP: (113-160)/(62-98)   SpO2:  [94 %-99 %] .   Home meds for hypertension were reviewed and noted below.   No chronic antihypertensives noted. Review of home meds reveals midodrine which has been reordered.      Will utilize p.r.n. blood pressure medication only if patient's blood pressure greater than  180/110 and she develops symptoms such as worsening chest pain or shortness of breath.        Malnutrition of moderate degree  Nutrition consult.      Chronic respiratory failure with hypoxia  Patient with Hypoxic Respiratory  failure which is Chronic.  she is vent dependent. Supplemental oxygen was provided and noted- Vent Mode: A/CMV-VC  Oxygen Concentration (%):  [35] 35  Resp Rate Total:  [24 br/min-43 br/min] 24 br/min  Vt Set:  [350 mL] 350 mL  PEEP/CPAP:  [4.3 cmH20-8.2 cmH20] 5.3 cmH20.   Signs/symptoms of respiratory failure include- tachypnea and increased work of breathing. Contributing diagnoses includes - Aspiration and Pneumonia Labs and images were reviewed. Patient Has not had a recent ABG. Will treat underlying causes.    PEG (percutaneous endoscopic gastrostomy) status  Noted; care per nursing.  TF per nutrition recs.  Slowly advance PEG tube feeding.        Tracheostomy dependence  Noted; care per nursing      Anoxic brain injury  Noted.  Turn q2h      Anemia of chronic disease  Patient's anemia is currently controlled. S/p 0 units of PRBCs.  Current CBC reviewed-   Lab Results   Component Value Date    HGB 8.2 (L) 01/06/2022    HCT 26.9 (L) 01/06/2022    MCV 92 01/06/2022     01/06/2022     Monitor serial CBC and transfuse if patient becomes hemodynamically unstable, symptomatic or H/H drops below 7/21.           VTE Risk Mitigation (From admission, onward)         Ordered     enoxaparin injection 40 mg  Daily         01/04/22 0034     IP VTE HIGH RISK PATIENT  Once         01/04/22 0034     Place sequential compression device  Until discontinued         01/04/22 0034                Discharge Planning   [place]: TBD     Code Status: Full Code   Is the patient medically ready for discharge?:     Reason for patient still in hospital (select all that apply): Patient trending condition  Discharge Plan A: Return to nursing home            Critical care time spent on the evaluation and treatment of severe organ dysfunction, review of pertinent labs and imaging studies, discussions with consulting providers and discussions with patient/family: 31 minutes.      Geeta Siddiqi MD  Department of Hospital Medicine   [facility]

## 2022-01-10 NOTE — CARE UPDATE
01/09/22 2012   Patient Assessment/Suction   Respiratory Effort Unlabored   Expansion/Accessory Muscles/Retractions accessory muscle use   All Lung Fields Breath Sounds Anterior:;Lateral:;coarse;rhonchi;diminished   Rhythm/Pattern, Respiratory assisted mechanically   Cough Frequency with stimulation   Cough Type assisted   Suction Method oral;tracheal   $ Suction Charges Inline Suction Procedure Stat Charge   Secretions Amount moderate   Secretions Color white   Secretions Characteristics thick   Sputum Collection sample obtained per suctioning   $ Swab or suction? Suction   Aspirate Toleration TENA (no adverse reactions)   Sent to the lab? No   PRE-TX-O2   O2 Device (Oxygen Therapy) ventilator   $ Is the patient on Low Flow Oxygen? Yes   Oxygen Concentration (%) 35   SpO2 100 %   Pulse Oximetry Type Continuous   $ Pulse Oximetry - Multiple Charge Pulse Oximetry - Multiple   Pulse 95   Resp (!) 35   Temp 99.32 °F (37.4 °C)   ETCO2   $ ETCO2 Usage Currently wearing   ETCO2 (mmHg) 24 mmHg   ETCO2 Device Type Bedside Monitor;Ventilator;Artificial Airway   Aerosol Therapy   $ Aerosol Therapy Charges Aerosol Treatment   Respiratory Treatment Status (SVN) given   Treatment Route (SVN) in-line   Patient Position (SVN) HOB elevated   Post Treatment Assessment (SVN) breath sounds unchanged   Signs of Intolerance (SVN) none   Breath Sounds Post-Respiratory Treatment   Throughout All Fields Post-Treatment All Fields   Throughout All Fields Post-Treatment no change   Post-treatment Heart Rate (beats/min) 98   Post-treatment Resp Rate (breaths/min) 35        Surgical Airway Portex Cuffed   No placement date or time found.   Present Prior to Hospital Arrival?: Yes  Type: Tracheostomy  Brand: Portex  Airway Device Size: 8.0  Style: Cuffed   Cuff Pressure 34 cm H2O   Cuff Inflation? Inflated   Status Secured   Site Assessment Clean;Dry   Ties Assessment Clean;Dry;Intact   Airway Safety   Ambu bag with the patient? Yes, Adult Ambu    Is mask with the patient? Yes, Adult Mask   Suction set is at the bedside? Yes   Extra trach at bedside? No   Is obturator available? No   Vent Select   Conventional Vent Y   Charged w/in last 24h NO   Preset Conventional Ventilator Settings   Vent Type NKV-550   Ventilation Type VC   Vent Mode A/CMV-VC   Humidity HME   Set Rate 24 BPM   Vt Set 350 mL   PEEP/CPAP 4 cmH20   Insp Time 0.8 Sec(s)   I-Trigger Type  Flow   Trigger Sensitivity Flow/I-Trigger 2.5 L/min   Patient Ventilator Parameters   Resp Rate Total 32 br/min   Peak Airway Pressure 50.8 cmH2O   Exhaled Vt 366 mL   Total Ve 9 mL   I:E Ratio Measured 1:1.1   Total PEEP 6.1 cmH20   Tube Type Trach   Inspired Tidal Volume (VTI) 384 mL   Conventional Ventilator Alarms   Alarms On Y   Ve High Alarm 20 L/min   Ve Low Alarm 63 L/min   Vt High Alarm 15 mL   Vt Low Alarm 4 mL   Resp Rate High Alarm 20 br/min   Press High Alarm 50 cmH2O   Apnea Rate 15   Apnea Volume (mL) 450 mL   Delay Alarm (Sec) 30 sec(s)   Ready to Wean/Extubation Screen   FIO2<=50 (chart decimal) 0.35   MV<16L (chart vol.) 9   PEEP <=8 (chart #) 4   Ready to Wean Parameters   F/VT Ratio<105 (RSBI) (!) 95.63

## 2022-01-10 NOTE — PLAN OF CARE
Medicated x 2 with Morphine iV today, as patient gets anxious and seem to julian the vent at times.  Morphine effective.  Dr. Montesinos rounded and changed antibiotics to Merrem only. The two peripheral IVs have held up well.  Rectal temps per probe with warming blanket in use until temp hit 99.0, then off.  Has tolerated TFs with rate at 30cc/hr, with reluctance to increase with patient's history of emesis of TF.  Accuchecks today required no coverage with insulin.  Has received NS at 150cc/hr IV all this shift.

## 2022-01-10 NOTE — PROGRESS NOTES
[facility]  Adult Nutrition  Progress Note    SUMMARY      Intervention: enteral nutrition therapy, collaboration with care providers      Recommendation:  1) Restart TF when medically able - same as TF PTA   Isosource 1.5 @ 20 ml/hr advancing to 50 ml/hr + 110 ml flush q 4 hr + beneprotein BID +Benny BID   ( provides 1800 kcal ( > 100% EEN), 81 g protein + bene ( 94% EPN), 912 ml free water)    2) If unable to restart TF in 2-3 days consider PPN  D 5 AA 4.25 @ 75 ml/hr + standard lipids ( 1112 kcal, 76 g protein)     3) weigh weekly     Goals: 1) TF advanced in < 4 days 2) TF meeting > 50% of needs at f/u  Nutrition Goal Status: was meeting/ new  Communication of RD Recs: POC, sticky note     Assessment and Plan     Inadequate energy/protein intake  R/t NPO, emesis  As evidenced by inadequate TF infustion x at least 2-3 days, stage 4 coccyx wound   Intervention: above  continues     Malnutrition Assessment  Skin (Micronutrient):  (Hardik = 11 , stage 4 coccyx wound, elbow wound)  Teeth (Micronutrient):  (WDL)   Micronutrient Evaluation: suspected deficiency  Micronutrient Evaluation Comments: Na, K, check iron, check vitamin C and zinc              Edema (Fluid Accumulation): 2-3+  Subcutaneous Fat Loss (Final Summary): well nourished  Muscle Loss Evaluation (Final Summary): well nourished       Reason for Assessment     Reason For Assessment: follow up  Diagnosis:  sepsis  Relevant Medical History: recurrent pneumonia/ UTI, [place] resident, HTN, GERD, PEG/trach, anoxic brain injury, cardiac arrest  Interdisciplinary Rounds: did not attend  General Information Comments: 60 y/o female admitted with sepsis and pneumonia s/p emesis x 2 days PTA. Has trach and PEG, was receiving Isosource 1.5 @ 50 ml/hr + 250 ml flush q 6 hr at [place] + arginaid packet BID. NFPE done 1/4/21 no wasting seen. NPO x 1 day inpatient. Wt fluctuations x 1 year.  1/10/21 Pt restarted at 20 ml/hr 1/8, was tolerating TF @  goal yesterday. This morning suctioned contents appeared to be TF, so TF was held and plan for KUB before restarting.     Nutrition Discharge Planning: To be determined- TF PTA Isosource 1.5 @ 20 ml/hr advancing to goal of 50 ml/hr + 250 ml flush q 6 hr + Benny or arginaid BID     Nutrition Risk Screen     Nutrition Risk Screen: tube feeding or parenteral nutrition,dysphagia or difficulty swallowing     Nutrition/Diet History     Patient Reported Diet/Restrictions/Preferences: no oral intake  Spiritual, Cultural Beliefs, Buddhist Practices, Values that Affect Care: no  Food Allergies: NKFA  Factors Affecting Nutritional Intake: NPO,difficulty/impaired swallowing     Anthropometrics  Height Method: Stated  Height: 5'  Height (inches): 60 in  Weight Method: Bed Scale  Weight: 72.3 kg 1/10/21, 65.7 kg 21,   Weight (lb): 159 lb  Ideal Body Weight (IBW), Female: 100 lb  BMI (Calculated): 28 kg/m2 admission  BMI Grade: 25 - 29.9 - overweight  Weight Loss: unintentional  Usual Body Weight (UBW), k.1 kg (20)  59.4 kg (10/25/21), 62.3 kg 21     Lab/Procedures/Meds     Pertinent Labs Reviewed: reviewed  BMP  Lab Results   Component Value Date     01/10/2022    K 3.8 01/10/2022     01/10/2022    CO2 20 (L) 01/10/2022    BUN 7 01/10/2022    CREATININE 0.4 (L) 01/10/2022    CALCIUM 8.2 (L) 01/10/2022    ANIONGAP 9 01/10/2022    ESTGFRAFRICA >60 01/10/2022    EGFRNONAA >60 01/10/2022     Recent Labs   Lab 01/10/22  1219   POCTGLUCOSE 78     Pertinent Medications Reviewed: reviewed   NS @ 150 ml/hr, Ca gluconate, Mgh sulfate, polyethylene glycol, Kbicarb, KCl, KNaPhos    Estimated/Assessed Needs     Weight Used For Calorie Calculations: 65.7 kg  Energy Calorie Requirements (kcal): MSJ ( x 1.25) = 4824-0038 kcal  [facility] Need Method: [facility]  Protein Requirements: 1.5 g protein/kg ( wound/ICU) = 98 g  Weight Used For Protein Calculations: 65.7 kg  Fluid Requirements (mL): 1400 ml or per  MD  Estimated Fluid Requirement Method: RDA Method  CHO Requirement: N/A        Nutrition Prescription Ordered     Current Diet Order: NPO      Evaluation of Received Nutrient/Fluid Intake     Energy Calories Required: not meeting needs  Protein Required: not meeting needs  Fluid Required: exceeding needs  IVF @ 150 ml/hr  Tolerance: NPO  % Intake of Estimated Energy Needs: 0%  % Meal Intake:NPO  Was meeting needs yesterday     Nutrition Risk     Level of Risk/Frequency of Follow-up: moderate 2 x weekly        Monitor and Evaluation     Food and Nutrient Intake: energy intake  Food and Nutrient Adminstration: enteral and parenteral nutrition administration  Anthropometric Measurements: weight  Biochemical Data, Medical Tests and Procedures: electrolyte and renal panel,gastrointestinal profile,glucose/endocrine profile  Nutrition-Focused Physical Findings: overall appearance         Nutrition Follow-Up     RD Follow-up?: Yes

## 2022-01-10 NOTE — NURSING
Notified Sultan CHEN of Jacqueline RN at bedside suctioning patient--contents appearing to look like tube feeding in color. Patient SpO2 82%, cough reflex present. HR sustaining 120s, /87 (122). Patient FiO2 increased to assist in achieving SpO2 >90% and tube feeding stopped. Sultan CHEN ordered stat chext xray and KUB. Notified xray of new orders.

## 2022-01-10 NOTE — CARE UPDATE
01/10/22 0728   Patient Assessment/Suction   Level of Consciousness (AVPU) responds to voice   Respiratory Effort Unlabored   Expansion/Accessory Muscles/Retractions expansion symmetric   All Lung Fields Breath Sounds Anterior:   LLL Breath Sounds diminished   RLL Breath Sounds diminished   Rhythm/Pattern, Respiratory assisted mechanically   Cough Frequency with stimulation   Cough Type fair;productive   Suction Method tracheal   Suction Pressure (mmHg) 100 mmHg   $ Suction Charges Inline Suction Procedure Stat Charge   Secretions Amount small   Secretions Color cloudy;yellow   Secretions Characteristics thick   PRE-TX-O2   O2 Device (Oxygen Therapy) ventilator   $ Is the patient on Low Flow Oxygen? Yes   Oxygen Concentration (%) 35   SpO2 97 %   Pulse Oximetry Type Continuous   $ Pulse Oximetry - Multiple Charge Pulse Oximetry - Multiple   Pulse 96   Resp (!) 30   Temp 97.7 °F (36.5 °C)   Positioning HOB elevated 30 degrees   Aerosol Therapy   $ Aerosol Therapy Charges Aerosol Treatment   Respiratory Treatment Status (SVN) given   Treatment Route (SVN) in-line;tracheostomy   Patient Position (SVN) HOB elevated   Signs of Intolerance (SVN) none   Breath Sounds Post-Respiratory Treatment   Throughout All Fields Post-Treatment All Fields   Throughout All Fields Post-Treatment aeration increased   Skin Integrity   $ Wound Care Tech Time 15 min   Area Observed Neck under tracheostomy   Skin Appearance without discoloration        Surgical Airway Portex Cuffed   No placement date or time found.   Present Prior to Hospital Arrival?: Yes  Type: Tracheostomy  Brand: Portex  Airway Device Size: 8.0  Style: Cuffed   Cuff Pressure 30 cm H2O   Cuff Inflation? Inflated   Status Secured   Site Assessment Clean;Dry   Site Care Cleansed;Dressing applied   Inner Cannula Care Changed/new   Ties Assessment Intact;Secure   General Safety Checklist   Safety Promotion/Fall Prevention side rails raised   Airway Safety   Ambu bag with  the patient? Yes, Adult Ambu   Is mask with the patient? Yes, Adult Mask   Suction set is at the bedside? Yes   Airway Assistance   $ Trach Assist Charges Trach Assist;Tech time 15 min   Vent Select   Conventional Vent Y   $ Ventilator Subsequent 1   Charged w/in last 24h YES   Preset Conventional Ventilator Settings   Vent Type NKV-550   Ventilation Type VC   Vent Mode A/CMV-VC   Humidity HME   Set Rate 24 BPM   Vt Set 350 mL   PEEP/CPAP 4.7 cmH20   Insp Time 0.8 Sec(s)   I-Trigger Type  Flow   Trigger Sensitivity Flow/I-Trigger 2.5 L/min   Patient Ventilator Parameters   Resp Rate Total 33 br/min   Peak Airway Pressure 29.1 cmH2O   Exhaled Vt 247 mL   Total Ve 7.99 mL   I:E Ratio Measured 1:1.1   Total PEEP 4.5 cmH20   Tube Type Trach   Inspired Tidal Volume (VTI) 315 mL   Conventional Ventilator Alarms   Alarms On Y   Ve High Alarm 20 L/min   Ve Low Alarm 63 L/min   Vt High Alarm 15 mL   Vt Low Alarm 4 mL   Resp Rate High Alarm 50 br/min   Press High Alarm 50 cmH2O   Apnea Rate 15   Apnea Volume (mL) 450 mL   Ready to Wean/Extubation Screen   FIO2<=50 (chart decimal) 0.35   MV<16L (chart vol.) 7.99   PEEP <=8 (chart #) 4.7   Ready to Wean Parameters   F/VT Ratio<105 (RSBI) 121.46   POx, nebs Q4, TC BID

## 2022-01-10 NOTE — PLAN OF CARE
Care plan reviewed. Safety maintained. Patient remains on ventilator with trach. Suctioned frequently by RT and nursing. Large amounts of tube feeding suctioned oral and trach. Patient had an episode of low SPO2 that required an increase with the oxygen per ventilator. Patient was guppy breathing, appeared shallow. Lung sounds greatly decreased with expiratory and inspiratory wheezes and rhonchi. Tube feeding stopped. Abdomen remains firm, distended. Patient had a stat CXR and KUB completed. Patient to have CT of abdomen with contrast today. No BM this shift. Hamilton catheter draining adequate urine. Rectal temp probe in place, bear hugger on entire shift. Patient remains unresponsive to commands. Does withdraw from pain or tactile stimulation. Opens eyes to noise in room. No purposeful eye contact. Patient seen by Dr. Siddiqi and Dr. Montesinos.

## 2022-01-10 NOTE — PLAN OF CARE
Problem: Communication Impairment (Mechanical Ventilation, Invasive)  Goal: Effective Communication  Outcome: Ongoing, Progressing     Problem: Device-Related Complication Risk (Mechanical Ventilation, Invasive)  Goal: Optimal Device Function  Outcome: Ongoing, Progressing     Problem: Inability to Wean (Mechanical Ventilation, Invasive)  Goal: Mechanical Ventilation Liberation  Outcome: Ongoing, Progressing     Problem: Nutrition Impairment (Mechanical Ventilation, Invasive)  Goal: Optimal Nutrition Delivery  Outcome: Ongoing, Progressing     Problem: Skin and Tissue Injury (Mechanical Ventilation, Invasive)  Goal: Absence of Device-Related Skin and Tissue Injury  Outcome: Ongoing, Progressing     Problem: Ventilator-Induced Lung Injury (Mechanical Ventilation, Invasive)  Goal: Absence of Ventilator-Induced Lung Injury  Outcome: Ongoing, Progressing     Problem: Communication Impairment (Artificial Airway)  Goal: Effective Communication  Outcome: Ongoing, Progressing     Problem: Device-Related Complication Risk (Artificial Airway)  Goal: Optimal Device Function  Outcome: Ongoing, Progressing     Problem: Skin and Tissue Injury (Artificial Airway)  Goal: Absence of Device-Related Skin or Tissue Injury  Outcome: Ongoing, Progressing     Problem: Noninvasive Ventilation Acute  Goal: Effective Unassisted Ventilation and Oxygenation  Outcome: Ongoing, Progressing     Problem: Adult Inpatient Plan of Care  Goal: Plan of Care Review  Outcome: Ongoing, Progressing  Goal: Patient-Specific Goal (Individualized)  Outcome: Ongoing, Progressing  Goal: Absence of Hospital-Acquired Illness or Injury  Outcome: Ongoing, Progressing  Goal: Optimal Comfort and Wellbeing  Outcome: Ongoing, Progressing  Goal: Readiness for Transition of Care  Outcome: Ongoing, Progressing     Problem: Adjustment to Illness (Delirium)  Goal: Optimal Coping  Outcome: Ongoing, Progressing     Problem: Altered Behavior (Delirium)  Goal: Improved  Behavioral Control  Outcome: Ongoing, Progressing     Problem: Attention and Thought Clarity Impairment (Delirium)  Goal: Improved Attention and Thought Clarity  Outcome: Ongoing, Progressing     Problem: Sleep Disturbance (Delirium)  Goal: Improved Sleep  Outcome: Ongoing, Progressing     Problem: Adjustment to Illness (Sepsis/Septic Shock)  Goal: Optimal Coping  Outcome: Ongoing, Progressing     Problem: Bleeding (Sepsis/Septic Shock)  Goal: Absence of Bleeding  Outcome: Ongoing, Progressing     Problem: Glycemic Control Impaired (Sepsis/Septic Shock)  Goal: Blood Glucose Level Within Desired Range  Outcome: Ongoing, Progressing     Problem: Infection Progression (Sepsis/Septic Shock)  Goal: Absence of Infection Signs and Symptoms  Outcome: Ongoing, Progressing     Problem: Nutrition Impaired (Sepsis/Septic Shock)  Goal: Optimal Nutrition Intake  Outcome: Ongoing, Progressing     Problem: Impaired Wound Healing  Goal: Optimal Wound Healing  Outcome: Ongoing, Progressing     Problem: Fall Injury Risk  Goal: Absence of Fall and Fall-Related Injury  Outcome: Ongoing, Progressing     Problem: Skin Injury Risk Increased  Goal: Skin Health and Integrity  Outcome: Ongoing, Progressing     Problem: Infection  Goal: Absence of Infection Signs and Symptoms  Outcome: Ongoing, Progressing     Problem: Aspiration (Enteral Nutrition)  Goal: Absence of Aspiration Signs and Symptoms  Outcome: Ongoing, Progressing     Problem: Device-Related Complication Risk (Enteral Nutrition)  Goal: Safe, Effective Therapy Delivery  Outcome: Ongoing, Progressing     Problem: Feeding Intolerance (Enteral Nutrition)  Goal: Feeding Tolerance  Outcome: Ongoing, Progressing

## 2022-01-11 PROBLEM — K81.0 ACUTE CHOLECYSTITIS: Status: ACTIVE | Noted: 2022-01-11

## 2022-01-11 LAB
ALBUMIN SERPL BCP-MCNC: 1.8 G/DL (ref 3.5–5.2)
ALP SERPL-CCNC: 141 U/L (ref 55–135)
ALT SERPL W/O P-5'-P-CCNC: 14 U/L (ref 10–44)
ANION GAP SERPL CALC-SCNC: 8 MMOL/L (ref 8–16)
AST SERPL-CCNC: 18 U/L (ref 10–40)
BASOPHILS # BLD AUTO: 0.02 K/UL (ref 0–0.2)
BASOPHILS NFR BLD: 0.2 % (ref 0–1.9)
BILIRUB SERPL-MCNC: 0.7 MG/DL (ref 0.1–1)
BUN SERPL-MCNC: 8 MG/DL (ref 6–20)
CALCIUM SERPL-MCNC: 8.8 MG/DL (ref 8.7–10.5)
CHLORIDE SERPL-SCNC: 101 MMOL/L (ref 95–110)
CO2 SERPL-SCNC: 23 MMOL/L (ref 23–29)
CREAT SERPL-MCNC: 0.4 MG/DL (ref 0.5–1.4)
DIFFERENTIAL METHOD: ABNORMAL
EOSINOPHIL # BLD AUTO: 0.2 K/UL (ref 0–0.5)
EOSINOPHIL NFR BLD: 2 % (ref 0–8)
ERYTHROCYTE [DISTWIDTH] IN BLOOD BY AUTOMATED COUNT: 16.5 % (ref 11.5–14.5)
EST. GFR  (AFRICAN AMERICAN): >60 ML/MIN/1.73 M^2
EST. GFR  (NON AFRICAN AMERICAN): >60 ML/MIN/1.73 M^2
GLUCOSE SERPL-MCNC: 83 MG/DL (ref 70–110)
HCT VFR BLD AUTO: 24.3 % (ref 37–48.5)
HGB BLD-MCNC: 7.6 G/DL (ref 12–16)
IMM GRANULOCYTES # BLD AUTO: 0.03 K/UL (ref 0–0.04)
IMM GRANULOCYTES NFR BLD AUTO: 0.3 % (ref 0–0.5)
LYMPHOCYTES # BLD AUTO: 1.5 K/UL (ref 1–4.8)
LYMPHOCYTES NFR BLD: 14.5 % (ref 18–48)
MAGNESIUM SERPL-MCNC: 1.6 MG/DL (ref 1.6–2.6)
MCH RBC QN AUTO: 28.1 PG (ref 27–31)
MCHC RBC AUTO-ENTMCNC: 31.3 G/DL (ref 32–36)
MCV RBC AUTO: 90 FL (ref 82–98)
MONOCYTES # BLD AUTO: 0.7 K/UL (ref 0.3–1)
MONOCYTES NFR BLD: 6.8 % (ref 4–15)
NEUTROPHILS # BLD AUTO: 8 K/UL (ref 1.8–7.7)
NEUTROPHILS NFR BLD: 76.2 % (ref 38–73)
NRBC BLD-RTO: 0 /100 WBC
PHOSPHATE SERPL-MCNC: 2.4 MG/DL (ref 2.7–4.5)
PLATELET # BLD AUTO: 297 K/UL (ref 150–450)
PMV BLD AUTO: 10.4 FL (ref 9.2–12.9)
POCT GLUCOSE: 85 MG/DL (ref 70–110)
POCT GLUCOSE: 88 MG/DL (ref 70–110)
POTASSIUM SERPL-SCNC: 3.9 MMOL/L (ref 3.5–5.1)
PROT SERPL-MCNC: 5.8 G/DL (ref 6–8.4)
RBC # BLD AUTO: 2.7 M/UL (ref 4–5.4)
SODIUM SERPL-SCNC: 132 MMOL/L (ref 136–145)
WBC # BLD AUTO: 10.49 K/UL (ref 3.9–12.7)

## 2022-01-11 PROCEDURE — 99233 PR SUBSEQUENT HOSPITAL CARE,LEVL III: ICD-10-PCS | Mod: ,,, | Performed by: INTERNAL MEDICINE

## 2022-01-11 PROCEDURE — 94761 N-INVAS EAR/PLS OXIMETRY MLT: CPT

## 2022-01-11 PROCEDURE — 25000003 PHARM REV CODE 250: Performed by: NURSE PRACTITIONER

## 2022-01-11 PROCEDURE — 80053 COMPREHEN METABOLIC PANEL: CPT | Performed by: NURSE PRACTITIONER

## 2022-01-11 PROCEDURE — 27000207 HC ISOLATION

## 2022-01-11 PROCEDURE — 36415 COLL VENOUS BLD VENIPUNCTURE: CPT | Performed by: NURSE PRACTITIONER

## 2022-01-11 PROCEDURE — S5010 5% DEXTROSE AND 0.45% SALINE: HCPCS | Performed by: INTERNAL MEDICINE

## 2022-01-11 PROCEDURE — 25000003 PHARM REV CODE 250: Performed by: HOSPITALIST

## 2022-01-11 PROCEDURE — 94640 AIRWAY INHALATION TREATMENT: CPT

## 2022-01-11 PROCEDURE — 27000221 HC OXYGEN, UP TO 24 HOURS

## 2022-01-11 PROCEDURE — 99233 SBSQ HOSP IP/OBS HIGH 50: CPT | Mod: ,,, | Performed by: INTERNAL MEDICINE

## 2022-01-11 PROCEDURE — 83735 ASSAY OF MAGNESIUM: CPT | Performed by: NURSE PRACTITIONER

## 2022-01-11 PROCEDURE — 99900026 HC AIRWAY MAINTENANCE (STAT)

## 2022-01-11 PROCEDURE — 85025 COMPLETE CBC W/AUTO DIFF WBC: CPT | Performed by: INTERNAL MEDICINE

## 2022-01-11 PROCEDURE — 25000003 PHARM REV CODE 250: Performed by: INTERNAL MEDICINE

## 2022-01-11 PROCEDURE — 25000242 PHARM REV CODE 250 ALT 637 W/ HCPCS: Performed by: NURSE PRACTITIONER

## 2022-01-11 PROCEDURE — 63600175 PHARM REV CODE 636 W HCPCS: Performed by: HOSPITALIST

## 2022-01-11 PROCEDURE — 63600175 PHARM REV CODE 636 W HCPCS: Performed by: NURSE PRACTITIONER

## 2022-01-11 PROCEDURE — 99900035 HC TECH TIME PER 15 MIN (STAT)

## 2022-01-11 PROCEDURE — 84100 ASSAY OF PHOSPHORUS: CPT | Performed by: NURSE PRACTITIONER

## 2022-01-11 PROCEDURE — 99223 1ST HOSP IP/OBS HIGH 75: CPT | Mod: ,,, | Performed by: STUDENT IN AN ORGANIZED HEALTH CARE EDUCATION/TRAINING PROGRAM

## 2022-01-11 PROCEDURE — 63600175 PHARM REV CODE 636 W HCPCS: Performed by: INTERNAL MEDICINE

## 2022-01-11 PROCEDURE — 25000003 PHARM REV CODE 250: Performed by: SURGERY

## 2022-01-11 PROCEDURE — 20000000 HC ICU ROOM

## 2022-01-11 PROCEDURE — 94003 VENT MGMT INPAT SUBQ DAY: CPT

## 2022-01-11 PROCEDURE — 99223 PR INITIAL HOSPITAL CARE,LEVL III: ICD-10-PCS | Mod: ,,, | Performed by: STUDENT IN AN ORGANIZED HEALTH CARE EDUCATION/TRAINING PROGRAM

## 2022-01-11 RX ADMIN — MEROPENEM 2 G: 1 INJECTION, POWDER, FOR SOLUTION INTRAVENOUS at 11:01

## 2022-01-11 RX ADMIN — IPRATROPIUM BROMIDE AND ALBUTEROL SULFATE 3 ML: 2.5; .5 SOLUTION RESPIRATORY (INHALATION) at 03:01

## 2022-01-11 RX ADMIN — MEROPENEM 2 G: 1 INJECTION, POWDER, FOR SOLUTION INTRAVENOUS at 03:01

## 2022-01-11 RX ADMIN — POTASSIUM & SODIUM PHOSPHATES POWDER PACK 280-160-250 MG 2 PACKET: 280-160-250 PACK at 08:01

## 2022-01-11 RX ADMIN — IPRATROPIUM BROMIDE AND ALBUTEROL SULFATE 3 ML: 2.5; .5 SOLUTION RESPIRATORY (INHALATION) at 11:01

## 2022-01-11 RX ADMIN — MIDODRINE HYDROCHLORIDE 10 MG: 5 TABLET ORAL at 08:01

## 2022-01-11 RX ADMIN — FAMOTIDINE 20 MG: 10 INJECTION INTRAVENOUS at 10:01

## 2022-01-11 RX ADMIN — CHLORHEXIDINE GLUCONATE 0.12% ORAL RINSE 15 ML: 1.2 LIQUID ORAL at 08:01

## 2022-01-11 RX ADMIN — IPRATROPIUM BROMIDE AND ALBUTEROL SULFATE 3 ML: 2.5; .5 SOLUTION RESPIRATORY (INHALATION) at 07:01

## 2022-01-11 RX ADMIN — POTASSIUM & SODIUM PHOSPHATES POWDER PACK 280-160-250 MG 2 PACKET: 280-160-250 PACK at 11:01

## 2022-01-11 RX ADMIN — MAGNESIUM SULFATE 2 G: 2 INJECTION INTRAVENOUS at 08:01

## 2022-01-11 RX ADMIN — CHLORHEXIDINE GLUCONATE 0.12% ORAL RINSE 15 ML: 1.2 LIQUID ORAL at 10:01

## 2022-01-11 RX ADMIN — IPRATROPIUM BROMIDE AND ALBUTEROL SULFATE 3 ML: 2.5; .5 SOLUTION RESPIRATORY (INHALATION) at 04:01

## 2022-01-11 RX ADMIN — DEXTROSE AND SODIUM CHLORIDE: 5; .45 INJECTION, SOLUTION INTRAVENOUS at 02:01

## 2022-01-11 RX ADMIN — MEROPENEM 2 G: 1 INJECTION, POWDER, FOR SOLUTION INTRAVENOUS at 07:01

## 2022-01-11 RX ADMIN — IPRATROPIUM BROMIDE AND ALBUTEROL SULFATE 3 ML: 2.5; .5 SOLUTION RESPIRATORY (INHALATION) at 12:01

## 2022-01-11 RX ADMIN — MIDODRINE HYDROCHLORIDE 10 MG: 5 TABLET ORAL at 03:01

## 2022-01-11 RX ADMIN — FAMOTIDINE 20 MG: 10 INJECTION INTRAVENOUS at 08:01

## 2022-01-11 RX ADMIN — ENOXAPARIN SODIUM 40 MG: 40 INJECTION SUBCUTANEOUS at 04:01

## 2022-01-11 RX ADMIN — MIDODRINE HYDROCHLORIDE 10 MG: 5 TABLET ORAL at 10:01

## 2022-01-11 NOTE — CARE UPDATE
01/11/22 0728   Patient Assessment/Suction   Level of Consciousness (AVPU) responds to voice   Respiratory Effort Unlabored   Expansion/Accessory Muscles/Retractions expansion symmetric;no use of accessory muscles   All Lung Fields Breath Sounds coarse;rhonchi   Rhythm/Pattern, Respiratory assisted mechanically   Cough Frequency with stimulation   Cough Type assisted   Suction Method tracheal   $ Suction Charges Inline Suction Procedure Stat Charge   Secretions Amount moderate   Secretions Color yellow   Secretions Characteristics thick   PRE-TX-O2   O2 Device (Oxygen Therapy) ventilator   $ Is the patient on Low Flow Oxygen? Yes   Oxygen Concentration (%) 50   SpO2 97 %   Pulse Oximetry Type Continuous   $ Pulse Oximetry - Multiple Charge Pulse Oximetry - Multiple   Pulse 85   Resp (!) 24   ETCO2   $ ETCO2 Usage Currently wearing   ETCO2 (mmHg) 27 mmHg   ETCO2 Device Type Bedside Monitor;Ventilator;Artificial Airway   Aerosol Therapy   $ Aerosol Therapy Charges Aerosol Treatment   Daily Review of Necessity (SVN) completed   Respiratory Treatment Status (SVN) given   Treatment Route (SVN) in-line  (aerogen)   Patient Position (SVN) HOB elevated   Post Treatment Assessment (SVN) breath sounds unchanged   Signs of Intolerance (SVN) none   Breath Sounds Post-Respiratory Treatment   Throughout All Fields Post-Treatment All Fields   Throughout All Fields Post-Treatment no change   Post-treatment Heart Rate (beats/min) 85   Post-treatment Resp Rate (breaths/min) 24   Skin Integrity   $ Wound Care Tech Time 15 min   Area Observed Neck under tracheostomy   Skin Appearance without discoloration        Surgical Airway Portex Cuffed   No placement date or time found.   Present Prior to Hospital Arrival?: Yes  Type: Tracheostomy  Brand: Portex  Airway Device Size: 8.0  Style: Cuffed   Cuff Pressure 34 cm H2O   Cuff Inflation? Inflated   Status Secured   Site Assessment Clean;Dry   Vent Select   Conventional Vent Y   $  Ventilator Subsequent 1   Charged w/in last 24h YES   Preset Conventional Ventilator Settings   Vent Type NKV-550   Ventilation Type VC   Vent Mode A/CMV-VC   Humidity HME   Set Rate 24 BPM   Vt Set 350 mL   PEEP/CPAP 5 cmH20   Insp Time 0.8 Sec(s)   I-Trigger Type  Flow   Trigger Sensitivity Flow/I-Trigger 2.5 L/min   Patient Ventilator Parameters   Resp Rate Total 29 br/min   Peak Airway Pressure 33.5 cmH2O   Mean Airway Pressure 13.7 cmH20   Exhaled Vt 335 mL   Total Ve 8.43 mL   Tube Type Trach   Inspired Tidal Volume (VTI) 340 mL   Conventional Ventilator Alarms   Alarms On Y   Ve High Alarm 20 L/min   Ve Low Alarm 3 L/min   Vt High Alarm 15 mL   Vt Low Alarm 4 mL   Resp Rate High Alarm 50 br/min   Press High Alarm 60 cmH2O   Apnea Rate 20   Ready to Wean/Extubation Screen   FIO2<=50 (chart decimal) 0.5   MV<16L (chart vol.) 8.43   PEEP <=8 (chart #) 5   Ready to Wean Parameters   F/VT Ratio<105 (RSBI) (!) 71.64   IBW/VT Calculations   Height 5' (1.524 m)   IBW/kg (Calculated) Female 45.5 kg   Low Range Vt 4cc/kg FEMALE 182 mL   Low Range Vt 6cc/kg FEMALE 273 mL   Adult Moderate Range vt 8cc/kg FEMALE 364 mL   Adult High Range Vt 10cc/kg FEMALE 455 mL   Current VT Set/IBW (Calculated) FEMALE 7.69 ML\Kg

## 2022-01-11 NOTE — CARE UPDATE
01/10/22 2003   Patient Assessment/Suction   Respiratory Effort Labored   Expansion/Accessory Muscles/Retractions expansion symmetric;accessory muscle use   All Lung Fields Breath Sounds Anterior:;Lateral:;coarse;diminished   Rhythm/Pattern, Respiratory assisted mechanically   Cough Frequency with stimulation   Cough Type assisted   Suction Method tracheal   Suction Pressure (mmHg) 100 mmHg   $ Suction Charges Inline Suction Procedure Stat Charge   Secretions Amount moderate   Secretions Color yellow   Secretions Characteristics thick   Sputum Collection sample obtained per suctioning   $ Swab or suction? Suction   Aspirate Toleration TENA (no adverse reactions)   Sent to the lab? No   PRE-TX-O2   O2 Device (Oxygen Therapy) ventilator   $ Is the patient on Low Flow Oxygen? Yes   Oxygen Concentration (%) 50   SpO2 98 %   Pulse Oximetry Type Continuous   $ Pulse Oximetry - Multiple Charge Pulse Oximetry - Multiple   Pulse 103   Resp (!) 28   Temp 98.24 °F (36.8 °C)   /73   ETCO2   $ ETCO2 Usage Currently wearing   ETCO2 (mmHg) 23 mmHg   ETCO2 Device Type Bedside Monitor;Artificial Airway;Ventilator   Aerosol Therapy   $ Aerosol Therapy Charges Aerosol Treatment   Respiratory Treatment Status (SVN) given   Treatment Route (SVN) in-line   Patient Position (SVN) HOB elevated   Post Treatment Assessment (SVN) breath sounds unchanged   Signs of Intolerance (SVN) none   Breath Sounds Post-Respiratory Treatment   Throughout All Fields Post-Treatment All Fields   Throughout All Fields Post-Treatment no change   Post-treatment Heart Rate (beats/min) 101   Post-treatment Resp Rate (breaths/min) 30        Surgical Airway Portex Cuffed   No placement date or time found.   Present Prior to Hospital Arrival?: Yes  Type: Tracheostomy  Brand: Portex  Airway Device Size: 8.0  Style: Cuffed   Cuff Pressure 34 cm H2O   Cuff Inflation? Inflated   Status Secured   Site Assessment Clean;Dry   Ties Assessment Intact;Secure   Airway  Safety   Ambu bag with the patient? Yes, Adult Ambu   Is mask with the patient? Yes, Adult Mask   Suction set is at the bedside? Yes   Extra trach at bedside? No   Is obturator available? No   Vent Select   Conventional Vent Y   $ Ventilator Subsequent 1   Charged w/in last 24h YES   Preset Conventional Ventilator Settings   Vent Type NKV-550   Ventilation Type VC   Vent Mode A/CMV-VC   Humidity HME   Set Rate 24 BPM   Vt Set 350 mL   PEEP/CPAP 5.6 cmH20   Insp Time 0.8 Sec(s)   I-Trigger Type  Flow   Trigger Sensitivity Flow/I-Trigger 2.5 L/min   Patient Ventilator Parameters   Resp Rate Total 28 br/min   Peak Airway Pressure 36.5 cmH2O   Mean Airway Pressure 15.2 cmH20   Exhaled Vt 328 mL   Total Ve 9.04 mL   I:E Ratio Measured 1:1.7   Total PEEP 5.4 cmH20   Tube Type Trach   Inspired Tidal Volume (VTI) 339 mL   Conventional Ventilator Alarms   Alarms On Y   Ve High Alarm 20 L/min   Ve Low Alarm 63 L/min   Vt High Alarm 15 mL   Vt Low Alarm 4 mL   Resp Rate High Alarm 50 br/min   Press High Alarm 50 cmH2O   Apnea Rate 15   Apnea Volume (mL) 450 mL   Delay Alarm (Sec) 30 sec(s)   Ready to Wean/Extubation Screen   FIO2<=50 (chart decimal) 0.5   MV<16L (chart vol.) 9.04   PEEP <=8 (chart #) 5.6   Ready to Wean Parameters   F/VT Ratio<105 (RSBI) (!) 85.37

## 2022-01-11 NOTE — SUBJECTIVE & OBJECTIVE
Interval History: Pulmonology following. Sputum culture grew Pseudomonas, Klebsiella pneumoniae (ESBL species) and Serratia species. Blood culture with CoNS- contaminant.  Urine with Proteus species.  Peg tube feeding at goal.  Significant gastric residual noted.  Pulmonary secretions suctioning gastric contents.  Tube feed is being held.  CT abdomen suggested acute cholecystitis.  Awaiting HIDA scan to be done.  General surgery following.    Review of Systems   Unable to perform ROS: Patient nonverbal     Objective:     Vital Signs (Most Recent):  Temp: 96.26 °F (35.7 °C) (01/11/22 0800)  Pulse: 98 (01/11/22 0800)  Resp: (!) 38 (01/11/22 0800)  BP: 106/61 (01/11/22 0800)  SpO2: 98 % (01/11/22 0800) Vital Signs (24h Range):  Temp:  [95.18 °F (35.1 °C)-98.6 °F (37 °C)] 96.26 °F (35.7 °C)  Pulse:  [] 98  Resp:  [24-48] 38  SpO2:  [83 %-100 %] 98 %  BP: (106-204)/(59-95) 106/61     Weight: 72.3 kg (159 lb 6.3 oz)  Body mass index is 31.13 kg/m².    Intake/Output Summary (Last 24 hours) at 1/11/2022 1012  Last data filed at 1/11/2022 0801  Gross per 24 hour   Intake 4770.8 ml   Output 1250 ml   Net 3520.8 ml      Physical Exam  Vitals and nursing note reviewed.   Constitutional:       General: She is not in acute distress.     Appearance: She is well-developed. She is ill-appearing.      Comments: Chronic trach/vent dependent   HENT:      Head: Normocephalic and atraumatic.      Mouth/Throat:      Mouth: Mucous membranes are dry.   Cardiovascular:      Rate and Rhythm: Normal rate and regular rhythm.   Pulmonary:      Effort: Pulmonary effort is normal. No respiratory distress.      Breath sounds: Rhonchi present.   Abdominal:      General: Bowel sounds are normal.      Palpations: Abdomen is soft.      Tenderness: There is no abdominal tenderness.      Comments: g tube in place   Musculoskeletal:      Cervical back: Normal range of motion and neck supple.      Comments: Contractures to hands   Skin:     General:  Skin is warm and dry.   Neurological:      GCS: GCS eye subscore is 1. GCS verbal subscore is 1. GCS motor subscore is 1.   Psychiatric:      Comments: Unable to assess         Significant Labs:  Lab Results   Component Value Date    WBC 10.49 01/11/2022    HGB 7.6 (L) 01/11/2022    HCT 24.3 (L) 01/11/2022    MCV 90 01/11/2022     01/11/2022       CMP  Sodium   Date Value Ref Range Status   01/11/2022 132 (L) 136 - 145 mmol/L Final     Potassium   Date Value Ref Range Status   01/11/2022 3.9 3.5 - 5.1 mmol/L Final     Chloride   Date Value Ref Range Status   01/11/2022 101 95 - 110 mmol/L Final     CO2   Date Value Ref Range Status   01/11/2022 23 23 - 29 mmol/L Final     Glucose   Date Value Ref Range Status   01/11/2022 83 70 - 110 mg/dL Final     BUN   Date Value Ref Range Status   01/11/2022 8 6 - 20 mg/dL Final     Creatinine   Date Value Ref Range Status   01/11/2022 0.4 (L) 0.5 - 1.4 mg/dL Final     Calcium   Date Value Ref Range Status   01/11/2022 8.8 8.7 - 10.5 mg/dL Final     Total Protein   Date Value Ref Range Status   01/11/2022 5.8 (L) 6.0 - 8.4 g/dL Final     Albumin   Date Value Ref Range Status   01/11/2022 1.8 (L) 3.5 - 5.2 g/dL Final     Total Bilirubin   Date Value Ref Range Status   01/11/2022 0.7 0.1 - 1.0 mg/dL Final     Comment:     For infants and newborns, interpretation of results should be based  on gestational age, weight and in agreement with clinical  observations.    Premature Infant recommended reference ranges:  Up to 24 hours.............<8.0 mg/dL  Up to 48 hours............<12.0 mg/dL  3-5 days..................<15.0 mg/dL  6-29 days.................<15.0 mg/dL       Alkaline Phosphatase   Date Value Ref Range Status   01/11/2022 141 (H) 55 - 135 U/L Final     AST   Date Value Ref Range Status   01/11/2022 18 10 - 40 U/L Final     ALT   Date Value Ref Range Status   01/11/2022 14 10 - 44 U/L Final     Anion Gap   Date Value Ref Range Status   01/11/2022 8 8 - 16 mmol/L  Final     eGFR if    Date Value Ref Range Status   01/11/2022 >60 >60 mL/min/1.73 m^2 Final     eGFR if non    Date Value Ref Range Status   01/11/2022 >60 >60 mL/min/1.73 m^2 Final     Comment:     Calculation used to obtain the estimated glomerular filtration  rate (eGFR) is the CKD-EPI equation.        Microbiology Results (last 7 days)     Procedure Component Value Units Date/Time    Culture, Respiratory with Gram Stain [[id number]]  (Abnormal)  (Susceptibility) Collected: 01/05/22 2009    Order Status: Completed Specimen: Respiratory from Endotracheal Aspirate Updated: 01/11/22 0637     Respiratory Culture No S aureus isolated.      SERRATIA MARCESCENS  Many        KLEBSIELLA PNEUMONIAE ESBL  Moderate        PSEUDOMONAS AERUGINOSA   Moderate       Gram Stain (Respiratory) <10 epithelial cells per low power field.     Gram Stain (Respiratory) Rare WBC's     Gram Stain (Respiratory) Few Gram negative rods     Gram Stain (Respiratory) Rare yeast    Blood culture x two cultures. Draw prior to antibiotics. [[id number]] Collected: 01/03/22 1950    Order Status: Completed Specimen: Blood Updated: 01/09/22 0612     Blood Culture, Routine No growth after 5 days.    Narrative:      Aerobic and anaerobic    Urine culture [[id number]]  (Abnormal)  (Susceptibility) Collected: 01/03/22 1943    Order Status: Completed Specimen: Urine Updated: 01/07/22 1553     Urine Culture, Routine PROTEUS MIRABILIS  >100,000 cfu/ml      Narrative:      Specimen Source->Urine    Blood culture x two cultures. Draw prior to antibiotics. [[id number]]  (Abnormal) Collected: 01/03/22 1950    Order Status: Completed Specimen: Blood Updated: 01/06/22 1313     Blood Culture, Routine Gram stain peds bottle: Gram positive cocci in clusters resembling Staph      Results called to and read back by:Yvonne Gamez RN 01/04/2022  23:08      COAGULASE-NEGATIVE STAPHYLOCOCCUS SPECIES  Organism is a probable contaminant       Narrative:      Aerobic and anaerobic          Significant Imaging:  CXR:  Worsening airspace opacities in bilateral lungs, suspicious for multifocal pneumonia.    CXR: Resolving intrapulmonary infiltrates with a small amount of consolidation remaining at the right lung base. Tracheostomy tube in position.    KUB: No acute abdominal process.    CXR: Resolving intrapulmonary infiltrates with a small amount of consolidation remaining at the right lung base. Tracheostomy tube in position.    CXR: Right lung infiltrate without significant change.    KUB:  No acute abdominal process.    CT abdomen and pelvis with contrast:  Features of acute cholecystitis.  Two stones appear present within the cystic duct.  No common duct stones identified. Small bilateral pleural effusions and patchy bibasilar airspace disease compatible with pneumonia.

## 2022-01-11 NOTE — NURSING
Reassessed the patients right elbow and bilateral palm of her hands. Right elbow continues to improve. Wound care with Urgotul and foam dressing done this am. Bilateral palm of the hands are healed at this time. No further interventions to the patients palm of the hands needed at this time.

## 2022-01-11 NOTE — PLAN OF CARE
Unable to discuss goals with patient due to level of consciousness.     Problem: Adult Inpatient Plan of Care  Goal: Absence of Hospital-Acquired Illness or Injury  Outcome: Ongoing, Progressing     Problem: Glycemic Control Impaired (Sepsis/Septic Shock)  Goal: Blood Glucose Level Within Desired Range  Outcome: Ongoing, Progressing

## 2022-01-11 NOTE — ASSESSMENT & PLAN NOTE
Follow HIDA scan and General surgery recommendations.  Continue intravenous antibiotic therapy.  Currently NPO with PEG tube feeding held.

## 2022-01-11 NOTE — PROGRESS NOTES
Progress Note  PULMONARY    Admit Date: 1/3/2022   01/11/2022      SUBJECTIVE:     1/5- no new issues  1/6- no new issues  1/7- remains on vent, GNR on sputum culture, CXR better. Per RN multiple episodes emesis so tube feeds are held  1/8- no new issues  1/9- no new issues  1/10- no new issues  1/11- yesterday had emesis, aspiration event. abg looks ok. Remains on vent      OBJECTIVE:     Vitals (Most recent):  Vitals:    01/11/22 0745   BP:    Pulse: 94   Resp: (!) 27   Temp: (!) 95.18 °F (35.1 °C)       Vitals (24 hour range):  Temp:  [95.18 °F (35.1 °C)-98.6 °F (37 °C)]   Pulse:  []   Resp:  [24-48]   BP: (104-204)/(59-95)   SpO2:  [83 %-100 %]       Intake/Output Summary (Last 24 hours) at 1/11/2022 0809  Last data filed at 1/11/2022 0801  Gross per 24 hour   Intake 5098.16 ml   Output 1500 ml   Net 3598.16 ml          Physical Exam:  The patient's neuro status (alertness,orientation,cognitive function,motor skills,), pharyngeal exam (oral lesions, hygiene, abn dentition,), Neck (jvd,mass,thyroid,nodes in neck and above/below clavicle),RESPIRATORY(symmetry,effort,fremitus,percussion,auscultation),  Cor(rhythm,heart tones including gallops,perfusion,edema)ABD(distention,hepatic&splenomegaly,tenderness,masses), Skin(rash,cyanosis),Psyc(affect,judgement,).  Exam negative except for these pertinent findings:    Unresponsive, eyes open and deviate to right, grimaces, no distress  Trach w vent  Bilateral coarse breath sounds  Abdomen soft, distended, hypoactive bowel sounds, PEG in place  Contracted upper ext  L hand dressed        Radiographs reviewed: view by direct vision   CXR 1/9- worsened appearance R mid/lower lung infiltrates  CXR 1/7- improved infiltrates  CXR 1/3/22- bilateral lower lobe infiltrates, worsened compared to previous film    Labs     Recent Labs   Lab 01/11/22  0416   WBC 10.49   HGB 7.6*   HCT 24.3*        Recent Labs   Lab 01/11/22  0416   *   K 3.9      CO2 23   BUN  8   CREATININE 0.4*   GLU 83   CALCIUM 8.8   MG 1.6   PHOS 2.4*   AST 18   ALT 14   ALKPHOS 141*   BILITOT 0.7   PROT 5.8*   ALBUMIN 1.8*     Recent Labs   Lab 01/10/22  1750   PH 7.346*   PCO2 43.7   PO2 84   HCO3 23.9*     Microbiology Results (last 7 days)     Procedure Component Value Units Date/Time    Culture, Respiratory with Gram Stain [[id number]]  (Abnormal)  (Susceptibility) Collected: 01/05/22 2009    Order Status: Completed Specimen: Respiratory from Endotracheal Aspirate Updated: 01/11/22 0637     Respiratory Culture No S aureus isolated.      SERRATIA MARCESCENS  Many        KLEBSIELLA PNEUMONIAE ESBL  Moderate        PSEUDOMONAS AERUGINOSA   Moderate       Gram Stain (Respiratory) <10 epithelial cells per low power field.     Gram Stain (Respiratory) Rare WBC's     Gram Stain (Respiratory) Few Gram negative rods     Gram Stain (Respiratory) Rare yeast    Blood culture x two cultures. Draw prior to antibiotics. [[id number]] Collected: 01/03/22 1950    Order Status: Completed Specimen: Blood Updated: 01/09/22 0612     Blood Culture, Routine No growth after 5 days.    Narrative:      Aerobic and anaerobic    Urine culture [[id number]]  (Abnormal)  (Susceptibility) Collected: 01/03/22 1943    Order Status: Completed Specimen: Urine Updated: 01/07/22 1553     Urine Culture, Routine PROTEUS MIRABILIS  >100,000 cfu/ml      Narrative:      Specimen Source->Urine    Blood culture x two cultures. Draw prior to antibiotics. [[id number]]  (Abnormal) Collected: 01/03/22 1950    Order Status: Completed Specimen: Blood Updated: 01/06/22 1313     Blood Culture, Routine Gram stain peds bottle: Gram positive cocci in clusters resembling Staph      Results called to and read back by:Yvonne Gamez RN 01/04/2022  23:08      COAGULASE-NEGATIVE STAPHYLOCOCCUS SPECIES  Organism is a probable contaminant      Narrative:      Aerobic and anaerobic          Impression:  Active Hospital Problems    Diagnosis  POA    *Sepsis  [A41.9]  Yes    Hyponatremia [E87.1]  Yes    UTI (urinary tract infection) [N39.0]  Yes    Ventilator associated pneumonia [J95.851]  Yes    Essential hypertension [I10]  Yes    Gastroesophageal reflux disease [K21.9]  Yes    Malnutrition of moderate degree [E44.0]  Yes    Chronic respiratory failure with hypoxia [J96.11]  Yes    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  Added automatically from request for surgery [id number]        Resolved Hospital Problems   No resolved problems to display.               Plan:     - continue vent, no weaning  - continue meropenem  - lovenox subq for DVT prophylaxis  - pepcid for GI prophylaxis  - wound care   - continue midodrine  - monitor Hgb  - tube feeds restart when able- ongoing issues w/ feed tolerance    Dora Montesinos MD  Pulmonary & Critical Care Medicine                            .

## 2022-01-11 NOTE — CONSULTS
[facility]  General Surgery  Consult Note    Consults  Subjective:      Chief Complaint/Reason for Admission:  For tachycardia    History of Present Illness:  This is a 61-year-old female with a past medical history of anoxic brain injury, G-tube and tracheostomy dependent, PE, hypertension, who presented from [facility] with tachycardia, leukocytosis, and a lactic acidosis.  She had evidence of a UTI as well as multifocal pneumonia on x-ray.     She underwent a CT scan for abdominal distension.  She had incidentally noted wall thickening and possible pericholecystic fluid although there was some motion degradation on the CT scan.    No current facility-administered medications on file prior to encounter.     Current Outpatient Medications on File Prior to Encounter   Medication Sig    acetaminophen (TYLENOL) 325 MG tablet 2 tablets (650 mg total) by Per G Tube route every 4 (four) hours as needed for Pain or Temperature greater than (100.4F).    acetylcysteine 100 mg/ml, 10%, (MUCOMYST) 100 mg/mL (10 %) nebulizer solution Take 4 mLs by nebulization every 8 (eight) hours.    albuterol-ipratropium (DUO-NEB) 2.5 mg-0.5 mg/3 mL nebulizer solution Take 3 mLs by nebulization every 6 (six) hours. Rescue    clonazePAM (KLONOPIN) 0.5 MG tablet 1 tablet (0.5 mg total) by Per G Tube route 3 (three) times daily.    ergocalciferol (ERGOCALCIFEROL) 50,000 unit Cap Take 50,000 Units by mouth every 7 days.    ferrous sulfate 300 mg (60 mg iron)/5 mL syrup Take 300 mg by mouth As instructed. Every other day    midodrine (PROAMATINE) 10 MG tablet 1 tablet (10 mg total) by Per G Tube route 3 (three) times daily.    polyethylene glycol (GLYCOLAX) 17 gram PwPk Take by mouth daily as needed.    sulfamethoxazole-trimethoprim 800-160mg (BACTRIM DS) 800-160 mg Tab 1 tablet by Per G Tube route 2 (two) times daily.       Review of patient's allergies indicates:  No Known Allergies    Past Medical History:    Diagnosis Date    Anoxic brain damage 07/2020    Bedbound 07/2020    Cardiac arrest 07/2020    Cirrhosis     GERD (gastroesophageal reflux disease)     Hemangioma of intra-abdominal structure     Hypertension     Nursing home resident     Protein calorie malnutrition     Pulmonary embolus     Respiratory distress     S/P percutaneous endoscopic gastrostomy (PEG) tube placement 07/2020    Total self-care deficit 07/2020    Tracheostomy dependence     7/2020     Past Surgical History:   Procedure Laterality Date    PEG W/TRACHEOSTOMY PLACEMENT  07/27/2020    SKIN GRAFT      buttocks     Family History     Problem Relation (Age of Onset)    No Known Problems Mother, Father        Tobacco Use    Smoking status: Never Smoker    Smokeless tobacco: Never Used   Substance and Sexual Activity    Alcohol use: Not Currently    Drug use: Not Currently    Sexual activity: Not Currently     Review of Systems   Unable to obtain secondary to neuro status  Objective:     Vital Signs (Most Recent):  Temp: 97.16 °F (36.2 °C) (01/11/22 1315)  Pulse: 94 (01/11/22 1315)  Resp: (!) 33 (01/11/22 1315)  BP: 128/72 (01/11/22 1300)  SpO2: 97 % (01/11/22 1315) Vital Signs (24h Range):  Temp:  [72.14 °F (22.3 °C)-98.6 °F (37 °C)] 97.16 °F (36.2 °C)  Pulse:  [] 94  Resp:  [24-47] 33  SpO2:  [93 %-100 %] 97 %  BP: ()/(57-87) 128/72     Weight: 72.3 kg (159 lb 6.3 oz)  Body mass index is 31.13 kg/m².      Intake/Output Summary (Last 24 hours) at 1/11/2022 1439  Last data filed at 1/11/2022 0801  Gross per 24 hour   Intake 4600.8 ml   Output 700 ml   Net 3900.8 ml       Physical Exam  Constitutional:       Appearance: She is ill-appearing.   HENT:      Head: Normocephalic.      Nose: Nose normal.   Eyes:      Conjunctiva/sclera: Conjunctivae normal.   Neck:      Comments: Trach  Cardiovascular:      Rate and Rhythm: Regular rhythm.   Pulmonary:      Comments: Ventilated  Abdominal:      Palpations: Abdomen is  soft.      Comments: G-tube   Musculoskeletal:      Comments: Contracted   Skin:     General: Skin is warm.   Neurological:      Mental Status: Mental status is at baseline.         Significant Labs:  CBC:   Recent Labs   Lab 01/11/22 0416   WBC 10.49   RBC 2.70*   HGB 7.6*   HCT 24.3*      MCV 90   MCH 28.1   MCHC 31.3*     CMP:   Recent Labs   Lab 01/11/22 0416   GLU 83   CALCIUM 8.8   ALBUMIN 1.8*   PROT 5.8*   *   K 3.9   CO2 23      BUN 8   CREATININE 0.4*   ALKPHOS 141*   ALT 14   AST 18   BILITOT 0.7     Coagulation: No results for input(s): PT, INR, APTT in the last 48 hours.  Lactic Acid: No results for input(s): LACTATE in the last 48 hours.    Significant Diagnostics:  CT reviewed.  Some wall thickening and possible pericholecystic fluid.  She has gallstones.  Seems to be some motion artifact.    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  She had an  incidental CT scan for reported abdominal distension which showed possible cholecystitis.    I have ordered a HIDA scan for additional evaluation.  Given her chronic medical problems, if HIDA is positive and there is nonfilling of the gallbladder, would recommend IR evaluation for percutaneous cholecystostomy tube.  If the HIDA shows filling of the gallbladder, no additional treatment is needed.      Thank you for your consult. I will follow-up with patient. Please contact us if you have any additional questions.    Grhaam Huff MD  General Surgery  [facility]

## 2022-01-11 NOTE — PROGRESS NOTES
[facility] Hospital Medicine  Progress Note    Patient Name: Genna Felix  MRN: [id number]  Patient Class: IP- Inpatient   Admission Date: 1/3/2022  Length of Stay: 8 days  Attending Physician: Geeta Siddiqi MD  Primary Care Provider: Primary Doctor No        Subjective:     Principal Problem:Sepsis        HPI:  Genna Felix is a 61 year old female with a past medical history of anoxic brain injury s/p G tube and tracheostomy, PE, HTN, GERD who presented from [place] for further evaluation of tachycardia.  She was discharged from this facility on 12/27/21 with a diagnosis of pneumonia with rx for bactrim.  In the ED she is found to be tachycardic with WBC 14K, lactic acid 2.6 and evidence of UTI.  CXR reveals worsening multifocal pneumonia.  Pt is nonverbal and cannot contribute to history of present illness, therefore further information is limited.  While in the ED, IV zosyn was initiated and she was give an IVF bolus.  She will be admitted to the service of hospital medicine for continued monitoring and treatment.      Overview/Hospital Course:  61-year-old female with past medical history of anoxic brain injury, PE, hypertension, GERD admitted to the hospital medicine service for UTI and worsening multifocal pneumonia.  She was placed on broad-spectrum antibiotics.  Pulmonologist was consulted.  Urine culture grew presumptive Proteus.  Sputum culture currently with few Gram-negative rods.      Interval History: Pulmonology following. Sputum culture grew Pseudomonas, Klebsiella pneumoniae (ESBL species) and Serratia species. Blood culture with CoNS- contaminant.  Urine with Proteus species.  Peg tube feeding at goal.  Significant gastric residual noted.  Pulmonary secretions suctioning gastric contents.  Tube feed is being held.  CT abdomen suggested acute cholecystitis.  Awaiting HIDA scan to be done.  General surgery following.    Review of Systems   Unable to perform ROS: Patient nonverbal      Objective:     Vital Signs (Most Recent):  Temp: 96.26 °F (35.7 °C) (01/11/22 0800)  Pulse: 98 (01/11/22 0800)  Resp: (!) 38 (01/11/22 0800)  BP: 106/61 (01/11/22 0800)  SpO2: 98 % (01/11/22 0800) Vital Signs (24h Range):  Temp:  [95.18 °F (35.1 °C)-98.6 °F (37 °C)] 96.26 °F (35.7 °C)  Pulse:  [] 98  Resp:  [24-48] 38  SpO2:  [83 %-100 %] 98 %  BP: (106-204)/(59-95) 106/61     Weight: 72.3 kg (159 lb 6.3 oz)  Body mass index is 31.13 kg/m².    Intake/Output Summary (Last 24 hours) at 1/11/2022 1012  Last data filed at 1/11/2022 0801  Gross per 24 hour   Intake 4770.8 ml   Output 1250 ml   Net 3520.8 ml      Physical Exam  Vitals and nursing note reviewed.   Constitutional:       General: She is not in acute distress.     Appearance: She is well-developed. She is ill-appearing.      Comments: Chronic trach/vent dependent   HENT:      Head: Normocephalic and atraumatic.      Mouth/Throat:      Mouth: Mucous membranes are dry.   Cardiovascular:      Rate and Rhythm: Normal rate and regular rhythm.   Pulmonary:      Effort: Pulmonary effort is normal. No respiratory distress.      Breath sounds: Rhonchi present.   Abdominal:      General: Bowel sounds are normal.      Palpations: Abdomen is soft.      Tenderness: There is no abdominal tenderness.      Comments: g tube in place   Musculoskeletal:      Cervical back: Normal range of motion and neck supple.      Comments: Contractures to hands   Skin:     General: Skin is warm and dry.   Neurological:      GCS: GCS eye subscore is 1. GCS verbal subscore is 1. GCS motor subscore is 1.   Psychiatric:      Comments: Unable to assess         Significant Labs:  Lab Results   Component Value Date    WBC 10.49 01/11/2022    HGB 7.6 (L) 01/11/2022    HCT 24.3 (L) 01/11/2022    MCV 90 01/11/2022     01/11/2022       CMP  Sodium   Date Value Ref Range Status   01/11/2022 132 (L) 136 - 145 mmol/L Final     Potassium   Date Value Ref Range Status   01/11/2022 3.9 3.5  - 5.1 mmol/L Final     Chloride   Date Value Ref Range Status   01/11/2022 101 95 - 110 mmol/L Final     CO2   Date Value Ref Range Status   01/11/2022 23 23 - 29 mmol/L Final     Glucose   Date Value Ref Range Status   01/11/2022 83 70 - 110 mg/dL Final     BUN   Date Value Ref Range Status   01/11/2022 8 6 - 20 mg/dL Final     Creatinine   Date Value Ref Range Status   01/11/2022 0.4 (L) 0.5 - 1.4 mg/dL Final     Calcium   Date Value Ref Range Status   01/11/2022 8.8 8.7 - 10.5 mg/dL Final     Total Protein   Date Value Ref Range Status   01/11/2022 5.8 (L) 6.0 - 8.4 g/dL Final     Albumin   Date Value Ref Range Status   01/11/2022 1.8 (L) 3.5 - 5.2 g/dL Final     Total Bilirubin   Date Value Ref Range Status   01/11/2022 0.7 0.1 - 1.0 mg/dL Final     Comment:     For infants and newborns, interpretation of results should be based  on gestational age, weight and in agreement with clinical  observations.    Premature Infant recommended reference ranges:  Up to 24 hours.............<8.0 mg/dL  Up to 48 hours............<12.0 mg/dL  3-5 days..................<15.0 mg/dL  6-29 days.................<15.0 mg/dL       Alkaline Phosphatase   Date Value Ref Range Status   01/11/2022 141 (H) 55 - 135 U/L Final     AST   Date Value Ref Range Status   01/11/2022 18 10 - 40 U/L Final     ALT   Date Value Ref Range Status   01/11/2022 14 10 - 44 U/L Final     Anion Gap   Date Value Ref Range Status   01/11/2022 8 8 - 16 mmol/L Final     eGFR if    Date Value Ref Range Status   01/11/2022 >60 >60 mL/min/1.73 m^2 Final     eGFR if non    Date Value Ref Range Status   01/11/2022 >60 >60 mL/min/1.73 m^2 Final     Comment:     Calculation used to obtain the estimated glomerular filtration  rate (eGFR) is the CKD-EPI equation.        Microbiology Results (last 7 days)     Procedure Component Value Units Date/Time    Culture, Respiratory with Gram Stain [[id number]]  (Abnormal)  (Susceptibility)  Collected: 01/05/22 2009    Order Status: Completed Specimen: Respiratory from Endotracheal Aspirate Updated: 01/11/22 0637     Respiratory Culture No S aureus isolated.      SERRATIA MARCESCENS  Many        KLEBSIELLA PNEUMONIAE ESBL  Moderate        PSEUDOMONAS AERUGINOSA   Moderate       Gram Stain (Respiratory) <10 epithelial cells per low power field.     Gram Stain (Respiratory) Rare WBC's     Gram Stain (Respiratory) Few Gram negative rods     Gram Stain (Respiratory) Rare yeast    Blood culture x two cultures. Draw prior to antibiotics. [[id number]] Collected: 01/03/22 1950    Order Status: Completed Specimen: Blood Updated: 01/09/22 0612     Blood Culture, Routine No growth after 5 days.    Narrative:      Aerobic and anaerobic    Urine culture [[id number]]  (Abnormal)  (Susceptibility) Collected: 01/03/22 1943    Order Status: Completed Specimen: Urine Updated: 01/07/22 1553     Urine Culture, Routine PROTEUS MIRABILIS  >100,000 cfu/ml      Narrative:      Specimen Source->Urine    Blood culture x two cultures. Draw prior to antibiotics. [[id number]]  (Abnormal) Collected: 01/03/22 1950    Order Status: Completed Specimen: Blood Updated: 01/06/22 1313     Blood Culture, Routine Gram stain peds bottle: Gram positive cocci in clusters resembling Staph      Results called to and read back by:Yvonne Gamez RN 01/04/2022  23:08      COAGULASE-NEGATIVE STAPHYLOCOCCUS SPECIES  Organism is a probable contaminant      Narrative:      Aerobic and anaerobic          Significant Imaging:  CXR:  Worsening airspace opacities in bilateral lungs, suspicious for multifocal pneumonia.    CXR: Resolving intrapulmonary infiltrates with a small amount of consolidation remaining at the right lung base. Tracheostomy tube in position.    KUB: No acute abdominal process.    CXR: Resolving intrapulmonary infiltrates with a small amount of consolidation remaining at the right lung base. Tracheostomy tube in position.    CXR: Right  "lung infiltrate without significant change.    KUB:  No acute abdominal process.    CT abdomen and pelvis with contrast:  Features of acute cholecystitis.  Two stones appear present within the cystic duct.  No common duct stones identified. Small bilateral pleural effusions and patchy bibasilar airspace disease compatible with pneumonia.      Assessment/Plan:      * Sepsis  This patient does have evidence of infective focus  My overall impression is sepsis. Vital signs were reviewed and noted in progress note.  Antibiotics given-   Antibiotics (From admission, onward)            Start     Stop Route Frequency Ordered    01/06/22 0630  vancomycin 1.25 g in dextrose 5% 250 mL IVPB (ready to mix)         -- IV Every 18 hours 01/06/22 0611    01/04/22 0900  mupirocin 2 % ointment         01/09 0859 Nasl 2 times daily 01/04/22 0142    01/04/22 0400  piperacillin-tazobactam 4.5 g in dextrose 5 % 100 mL IVPB (ready to mix system)         -- IV Every 8 hours (non-standard times) 01/03/22 2141    01/03/22 2241  vancomycin - pharmacy to dose  (vancomycin IVPB)        "And" Linked Group Details    -- IV pharmacy to manage frequency 01/03/22 2141        Cultures were taken-   Microbiology Results (last 7 days)     Procedure Component Value Units Date/Time    Culture, Respiratory with Gram Stain [[id number]]  (Abnormal) Collected: 01/05/22 2009    Order Status: Completed Specimen: Respiratory from Endotracheal Aspirate Updated: 01/07/22 1035     Respiratory Culture GRAM NEGATIVE CHRISTINA  Many  Identification and susceptibility pending       Gram Stain (Respiratory) <10 epithelial cells per low power field.     Gram Stain (Respiratory) Rare WBC's     Gram Stain (Respiratory) Few Gram negative rods     Gram Stain (Respiratory) Rare yeast    Blood culture x two cultures. Draw prior to antibiotics. [[id number]] Collected: 01/03/22 1950    Order Status: Completed Specimen: Blood Updated: 01/07/22 0612     Blood Culture, Routine No Growth " to date      No Growth to date      No Growth to date      No Growth to date    Narrative:      Aerobic and anaerobic    Urine culture [[id number]]  (Abnormal)  (Susceptibility) Collected: 01/03/22 1943    Order Status: Completed Specimen: Urine Updated: 01/06/22 1535     Urine Culture, Routine PROTEUS MIRABILIS  >100,000 cfu/ml  Identification and susceptibility pending      Narrative:      Specimen Source->Urine    Blood culture x two cultures. Draw prior to antibiotics. [[id number]]  (Abnormal) Collected: 01/03/22 1950    Order Status: Completed Specimen: Blood Updated: 01/06/22 1313     Blood Culture, Routine Gram stain peds bottle: Gram positive cocci in clusters resembling Staph      Results called to and read back by:Yvonne Gamez RN 01/04/2022  23:08      COAGULASE-NEGATIVE STAPHYLOCOCCUS SPECIES  Organism is a probable contaminant      Narrative:      Aerobic and anaerobic        Latest lactate reviewed, they are-  No results for input(s): LACTATE in the last 72 hours.    Organ dysfunction indicated by tachycardia, tachypnea  Source- respiratory, urine      Source control Achieved by- IV abx      Acute cholecystitis  Follow HIDA scan and General surgery recommendations.  Continue intravenous antibiotic therapy.  Currently NPO with PEG tube feeding held.      Hyponatremia  Follow BMP.       UTI (urinary tract infection)  IV meropenem; follow culture        Pressure injury of left buttock, stage 4  Consult wound care  Turn q2h        Ventilator associated pneumonia  Failed OP abx  Pt vent dependent-consult pulmonology for vent management  On intravenous meropenem and vancomycin.  Blood and sputum cx  Nebulizer treatments        Gastroesophageal reflux disease  Chronic; utilize pepcid acutely.      Essential hypertension  Chronic, stable.  Latest blood pressure and vitals reviewed-   Temp:  [96.08 °F (35.6 °C)-98.24 °F (36.8 °C)]   Pulse:  []   Resp:  [10-75]   BP: (113-160)/(62-98)   SpO2:  [94 %-99 %] .    Home meds for hypertension were reviewed and noted below.   No chronic antihypertensives noted. Review of home meds reveals midodrine which has been reordered.      Will utilize p.r.n. blood pressure medication only if patient's blood pressure greater than  180/110 and she develops symptoms such as worsening chest pain or shortness of breath.        Malnutrition of moderate degree  Nutrition consult.      Chronic respiratory failure with hypoxia  Patient with Hypoxic Respiratory failure which is Chronic.  she is vent dependent. Supplemental oxygen was provided and noted- Vent Mode: A/CMV-VC  Oxygen Concentration (%):  [35] 35  Resp Rate Total:  [24 br/min-43 br/min] 24 br/min  Vt Set:  [350 mL] 350 mL  PEEP/CPAP:  [4.3 cmH20-8.2 cmH20] 5.3 cmH20.   Signs/symptoms of respiratory failure include- tachypnea and increased work of breathing. Contributing diagnoses includes - Aspiration and Pneumonia Labs and images were reviewed. Patient Has not had a recent ABG. Will treat underlying causes.    PEG (percutaneous endoscopic gastrostomy) status  Noted; care per nursing.  TF per nutrition recs.  Slowly advance PEG tube feeding.        Tracheostomy dependence  Noted; care per nursing      Anoxic brain injury  Noted.  Turn q2h      Anemia of chronic disease  Patient's anemia is currently controlled. S/p 0 units of PRBCs.  Current CBC reviewed-   Lab Results   Component Value Date    HGB 8.2 (L) 01/06/2022    HCT 26.9 (L) 01/06/2022    MCV 92 01/06/2022     01/06/2022     Monitor serial CBC and transfuse if patient becomes hemodynamically unstable, symptomatic or H/H drops below 7/21.           VTE Risk Mitigation (From admission, onward)         Ordered     enoxaparin injection 40 mg  Daily         01/04/22 0034     IP VTE HIGH RISK PATIENT  Once         01/04/22 0034     Place sequential compression device  Until discontinued         01/04/22 0034                Discharge Planning   [place]:  TBD    Code Status: Full  Code   Is the patient medically ready for discharge?:     Reason for patient still in hospital (select all that apply): Patient trending condition  Discharge Plan A: Return to nursing home            Critical care time spent on the evaluation and treatment of severe organ dysfunction, review of pertinent labs and imaging studies, discussions with consulting providers and discussions with patient/family: 33 minutes.      Geeta Siddiqi MD  Department of Hospital Medicine   [facility]

## 2022-01-12 ENCOUNTER — OUTSIDE PLACE OF SERVICE (OUTPATIENT)
Dept: INFECTIOUS DISEASES | Facility: CLINIC | Age: 62
End: 2022-01-12
Payer: MEDICARE

## 2022-01-12 DIAGNOSIS — J95.851 VENTILATOR ASSOCIATED PNEUMONIA: ICD-10-CM

## 2022-01-12 DIAGNOSIS — Z93.0 TRACHEOSTOMY DEPENDENCE: ICD-10-CM

## 2022-01-12 DIAGNOSIS — K81.0 ACUTE CHOLECYSTITIS: ICD-10-CM

## 2022-01-12 DIAGNOSIS — A41.9 SEPSIS: ICD-10-CM

## 2022-01-12 LAB
ALBUMIN SERPL BCP-MCNC: 1.9 G/DL (ref 3.5–5.2)
ALP SERPL-CCNC: 144 U/L (ref 55–135)
ALT SERPL W/O P-5'-P-CCNC: 12 U/L (ref 10–44)
ANION GAP SERPL CALC-SCNC: 9 MMOL/L (ref 8–16)
AST SERPL-CCNC: 10 U/L (ref 10–40)
BASOPHILS # BLD AUTO: 0.02 K/UL (ref 0–0.2)
BASOPHILS NFR BLD: 0.3 % (ref 0–1.9)
BILIRUB SERPL-MCNC: 0.5 MG/DL (ref 0.1–1)
BUN SERPL-MCNC: 7 MG/DL (ref 6–20)
CALCIUM SERPL-MCNC: 8.7 MG/DL (ref 8.7–10.5)
CHLORIDE SERPL-SCNC: 99 MMOL/L (ref 95–110)
CO2 SERPL-SCNC: 26 MMOL/L (ref 23–29)
CREAT SERPL-MCNC: 0.4 MG/DL (ref 0.5–1.4)
CRP SERPL-MCNC: 123.8 MG/L (ref 0–8.2)
DIFFERENTIAL METHOD: ABNORMAL
EOSINOPHIL # BLD AUTO: 0.3 K/UL (ref 0–0.5)
EOSINOPHIL NFR BLD: 4.2 % (ref 0–8)
ERYTHROCYTE [DISTWIDTH] IN BLOOD BY AUTOMATED COUNT: 16.6 % (ref 11.5–14.5)
EST. GFR  (AFRICAN AMERICAN): >60 ML/MIN/1.73 M^2
EST. GFR  (NON AFRICAN AMERICAN): >60 ML/MIN/1.73 M^2
GLUCOSE SERPL-MCNC: 86 MG/DL (ref 70–110)
HCT VFR BLD AUTO: 24.3 % (ref 37–48.5)
HGB BLD-MCNC: 7.6 G/DL (ref 12–16)
HYPOCHROMIA BLD QL SMEAR: ABNORMAL
IMM GRANULOCYTES # BLD AUTO: 0.02 K/UL (ref 0–0.04)
IMM GRANULOCYTES NFR BLD AUTO: 0.3 % (ref 0–0.5)
LYMPHOCYTES # BLD AUTO: 1.4 K/UL (ref 1–4.8)
LYMPHOCYTES NFR BLD: 24.1 % (ref 18–48)
MAGNESIUM SERPL-MCNC: 1.5 MG/DL (ref 1.6–2.6)
MAGNESIUM SERPL-MCNC: 1.6 MG/DL (ref 1.6–2.6)
MCH RBC QN AUTO: 27.6 PG (ref 27–31)
MCHC RBC AUTO-ENTMCNC: 31.3 G/DL (ref 32–36)
MCV RBC AUTO: 88 FL (ref 82–98)
MONOCYTES # BLD AUTO: 0.6 K/UL (ref 0.3–1)
MONOCYTES NFR BLD: 10.6 % (ref 4–15)
NEUTROPHILS # BLD AUTO: 3.6 K/UL (ref 1.8–7.7)
NEUTROPHILS NFR BLD: 60.5 % (ref 38–73)
NRBC BLD-RTO: 0 /100 WBC
PHOSPHATE SERPL-MCNC: 2.5 MG/DL (ref 2.7–4.5)
PLATELET # BLD AUTO: 320 K/UL (ref 150–450)
PLATELET BLD QL SMEAR: ABNORMAL
PMV BLD AUTO: 10 FL (ref 9.2–12.9)
POCT GLUCOSE: 102 MG/DL (ref 70–110)
POCT GLUCOSE: 105 MG/DL (ref 70–110)
POCT GLUCOSE: 108 MG/DL (ref 70–110)
POCT GLUCOSE: 111 MG/DL (ref 70–110)
POTASSIUM SERPL-SCNC: 3.6 MMOL/L (ref 3.5–5.1)
POTASSIUM SERPL-SCNC: 3.9 MMOL/L (ref 3.5–5.1)
PROCALCITONIN SERPL IA-MCNC: 0.57 NG/ML
PROT SERPL-MCNC: 6.1 G/DL (ref 6–8.4)
RBC # BLD AUTO: 2.75 M/UL (ref 4–5.4)
SODIUM SERPL-SCNC: 134 MMOL/L (ref 136–145)
WBC # BLD AUTO: 5.97 K/UL (ref 3.9–12.7)

## 2022-01-12 PROCEDURE — 99233 SBSQ HOSP IP/OBS HIGH 50: CPT | Mod: ,,, | Performed by: INTERNAL MEDICINE

## 2022-01-12 PROCEDURE — 99900026 HC AIRWAY MAINTENANCE (STAT)

## 2022-01-12 PROCEDURE — 99223 1ST HOSP IP/OBS HIGH 75: CPT | Mod: S$GLB,,, | Performed by: STUDENT IN AN ORGANIZED HEALTH CARE EDUCATION/TRAINING PROGRAM

## 2022-01-12 PROCEDURE — 94640 AIRWAY INHALATION TREATMENT: CPT

## 2022-01-12 PROCEDURE — 80053 COMPREHEN METABOLIC PANEL: CPT | Performed by: NURSE PRACTITIONER

## 2022-01-12 PROCEDURE — 25000242 PHARM REV CODE 250 ALT 637 W/ HCPCS: Performed by: NURSE PRACTITIONER

## 2022-01-12 PROCEDURE — 36415 COLL VENOUS BLD VENIPUNCTURE: CPT | Performed by: NURSE PRACTITIONER

## 2022-01-12 PROCEDURE — 83735 ASSAY OF MAGNESIUM: CPT | Mod: 91 | Performed by: INTERNAL MEDICINE

## 2022-01-12 PROCEDURE — 27000207 HC ISOLATION

## 2022-01-12 PROCEDURE — 63600175 PHARM REV CODE 636 W HCPCS: Performed by: INTERNAL MEDICINE

## 2022-01-12 PROCEDURE — 84100 ASSAY OF PHOSPHORUS: CPT | Performed by: NURSE PRACTITIONER

## 2022-01-12 PROCEDURE — 25000003 PHARM REV CODE 250: Performed by: INTERNAL MEDICINE

## 2022-01-12 PROCEDURE — 25000003 PHARM REV CODE 250: Performed by: STUDENT IN AN ORGANIZED HEALTH CARE EDUCATION/TRAINING PROGRAM

## 2022-01-12 PROCEDURE — 94761 N-INVAS EAR/PLS OXIMETRY MLT: CPT

## 2022-01-12 PROCEDURE — 87040 BLOOD CULTURE FOR BACTERIA: CPT | Performed by: INTERNAL MEDICINE

## 2022-01-12 PROCEDURE — 85025 COMPLETE CBC W/AUTO DIFF WBC: CPT | Performed by: INTERNAL MEDICINE

## 2022-01-12 PROCEDURE — 25000003 PHARM REV CODE 250: Performed by: NURSE PRACTITIONER

## 2022-01-12 PROCEDURE — 63600175 PHARM REV CODE 636 W HCPCS: Performed by: SURGERY

## 2022-01-12 PROCEDURE — 84132 ASSAY OF SERUM POTASSIUM: CPT | Performed by: INTERNAL MEDICINE

## 2022-01-12 PROCEDURE — 84145 PROCALCITONIN (PCT): CPT | Performed by: STUDENT IN AN ORGANIZED HEALTH CARE EDUCATION/TRAINING PROGRAM

## 2022-01-12 PROCEDURE — 63600175 PHARM REV CODE 636 W HCPCS: Performed by: HOSPITALIST

## 2022-01-12 PROCEDURE — 25000003 PHARM REV CODE 250: Performed by: SURGERY

## 2022-01-12 PROCEDURE — 63600175 PHARM REV CODE 636 W HCPCS: Performed by: STUDENT IN AN ORGANIZED HEALTH CARE EDUCATION/TRAINING PROGRAM

## 2022-01-12 PROCEDURE — 36415 COLL VENOUS BLD VENIPUNCTURE: CPT | Performed by: STUDENT IN AN ORGANIZED HEALTH CARE EDUCATION/TRAINING PROGRAM

## 2022-01-12 PROCEDURE — 99233 PR SUBSEQUENT HOSPITAL CARE,LEVL III: ICD-10-PCS | Mod: ,,, | Performed by: INTERNAL MEDICINE

## 2022-01-12 PROCEDURE — 99900022

## 2022-01-12 PROCEDURE — 27000221 HC OXYGEN, UP TO 24 HOURS

## 2022-01-12 PROCEDURE — 99900035 HC TECH TIME PER 15 MIN (STAT)

## 2022-01-12 PROCEDURE — 20000000 HC ICU ROOM

## 2022-01-12 PROCEDURE — 83735 ASSAY OF MAGNESIUM: CPT | Performed by: NURSE PRACTITIONER

## 2022-01-12 PROCEDURE — 86140 C-REACTIVE PROTEIN: CPT | Performed by: STUDENT IN AN ORGANIZED HEALTH CARE EDUCATION/TRAINING PROGRAM

## 2022-01-12 PROCEDURE — S5010 5% DEXTROSE AND 0.45% SALINE: HCPCS | Performed by: INTERNAL MEDICINE

## 2022-01-12 PROCEDURE — 99223 PR INITIAL HOSPITAL CARE,LEVL III: ICD-10-PCS | Mod: S$GLB,,, | Performed by: STUDENT IN AN ORGANIZED HEALTH CARE EDUCATION/TRAINING PROGRAM

## 2022-01-12 PROCEDURE — 94003 VENT MGMT INPAT SUBQ DAY: CPT

## 2022-01-12 RX ORDER — SODIUM CHLORIDE 9 MG/ML
INJECTION, SOLUTION INTRAVENOUS CONTINUOUS
Status: DISCONTINUED | OUTPATIENT
Start: 2022-01-12 | End: 2022-01-21 | Stop reason: HOSPADM

## 2022-01-12 RX ADMIN — MEROPENEM 2 G: 1 INJECTION, POWDER, FOR SOLUTION INTRAVENOUS at 11:01

## 2022-01-12 RX ADMIN — IPRATROPIUM BROMIDE AND ALBUTEROL SULFATE 3 ML: 2.5; .5 SOLUTION RESPIRATORY (INHALATION) at 12:01

## 2022-01-12 RX ADMIN — DEXTROSE AND SODIUM CHLORIDE 100 ML/HR: 5; .45 INJECTION, SOLUTION INTRAVENOUS at 11:01

## 2022-01-12 RX ADMIN — IPRATROPIUM BROMIDE AND ALBUTEROL SULFATE 3 ML: 2.5; .5 SOLUTION RESPIRATORY (INHALATION) at 08:01

## 2022-01-12 RX ADMIN — MORPHINE SULFATE 2 MG: 2 INJECTION, SOLUTION INTRAMUSCULAR; INTRAVENOUS at 09:01

## 2022-01-12 RX ADMIN — IPRATROPIUM BROMIDE AND ALBUTEROL SULFATE 3 ML: 2.5; .5 SOLUTION RESPIRATORY (INHALATION) at 04:01

## 2022-01-12 RX ADMIN — CHLORHEXIDINE GLUCONATE 0.12% ORAL RINSE 15 ML: 1.2 LIQUID ORAL at 08:01

## 2022-01-12 RX ADMIN — FAMOTIDINE 20 MG: 10 INJECTION INTRAVENOUS at 09:01

## 2022-01-12 RX ADMIN — POTASSIUM CHLORIDE 40 MEQ: 7.46 INJECTION, SOLUTION INTRAVENOUS at 07:01

## 2022-01-12 RX ADMIN — CEFTAZIDIME, AVIBACTAM 2.5 G: 2; .5 POWDER, FOR SOLUTION INTRAVENOUS at 07:01

## 2022-01-12 RX ADMIN — FAMOTIDINE 20 MG: 10 INJECTION INTRAVENOUS at 08:01

## 2022-01-12 RX ADMIN — MEROPENEM 2 G: 1 INJECTION, POWDER, FOR SOLUTION INTRAVENOUS at 03:01

## 2022-01-12 RX ADMIN — IPRATROPIUM BROMIDE AND ALBUTEROL SULFATE 3 ML: 2.5; .5 SOLUTION RESPIRATORY (INHALATION) at 09:01

## 2022-01-12 RX ADMIN — MIDODRINE HYDROCHLORIDE 10 MG: 5 TABLET ORAL at 03:01

## 2022-01-12 RX ADMIN — DEXTROSE AND SODIUM CHLORIDE: 5; .45 INJECTION, SOLUTION INTRAVENOUS at 12:01

## 2022-01-12 RX ADMIN — SODIUM CHLORIDE 5 ML/HR: 0.9 INJECTION, SOLUTION INTRAVENOUS at 09:01

## 2022-01-12 RX ADMIN — MAGNESIUM SULFATE 2 G: 2 INJECTION INTRAVENOUS at 07:01

## 2022-01-12 RX ADMIN — MORPHINE SULFATE 2 MG: 2 INJECTION, SOLUTION INTRAMUSCULAR; INTRAVENOUS at 03:01

## 2022-01-12 RX ADMIN — DEXTROSE AND SODIUM CHLORIDE: 5; .45 INJECTION, SOLUTION INTRAVENOUS at 11:01

## 2022-01-12 RX ADMIN — DEXTROSE AND SODIUM CHLORIDE 100 ML/HR: 5; .45 INJECTION, SOLUTION INTRAVENOUS at 09:01

## 2022-01-12 RX ADMIN — CHLORHEXIDINE GLUCONATE 0.12% ORAL RINSE 15 ML: 1.2 LIQUID ORAL at 09:01

## 2022-01-12 RX ADMIN — MIDODRINE HYDROCHLORIDE 10 MG: 5 TABLET ORAL at 09:01

## 2022-01-12 NOTE — PROGRESS NOTES
[facility]  Adult Nutrition  Progress Note     SUMMARY      Intervention: enteral nutrition therapy, collaboration with care providers      Recommendation:  1) Restart TF when medically able - same as TF PTA   Isosource 1.5 @ 20 ml/hr advancing to 50 ml/hr + 110 ml flush q 4 hr + beneprotein BID +Benny BID   ( provides 1800 kcal ( > 100% EEN), 81 g protein + bene ( 94% EPN), 912 ml free water)   2) If unable to restart TF in 2-3 days consider PPN  D 5 AA 4.25 @ 75 ml/hr + standard lipids ( 1112 kcal, 76 g protein)     3) weigh weekly, replete lytes as needed     Goals: 1) TF advanced in < 4 days 2) TF meeting > 50% of needs at f/u  Nutrition Goal Status: was meeting/ not met  Communication of RD Recs: POC, sticky note     Assessment and Plan     Inadequate energy/protein intake  R/t NPO, emesis  As evidenced by inadequate TF infustion x at least 2-3 days, stage 4 coccyx wound   Intervention: above  continues     Malnutrition Assessment  Skin (Micronutrient):  (Hardik = 11 , stage 4 coccyx wound, elbow wound)  Teeth (Micronutrient):  (WDL)   Micronutrient Evaluation: suspected deficiency  Micronutrient Evaluation Comments:Low Na, P, Mg, Creatinine, Albumin, check iron, check vitamin C and zinc              Edema (Fluid Accumulation): 2+  Subcutaneous Fat Loss (Final Summary): well nourished  Muscle Loss Evaluation (Final Summary): well nourished       Reason for Assessment     Reason For Assessment: follow up  Diagnosis:  sepsis  Relevant Medical History: recurrent pneumonia/ UTI, [place] resident, HTN, GERD, PEG/trach, anoxic brain injury, cardiac arrest  Interdisciplinary Rounds: did not attend    General Information Comments: 60 y/o female admitted with sepsis and pneumonia s/p emesis x 2 days PTA. Has trach and PEG, was receiving Isosource 1.5 @ 50 ml/hr + 250 ml flush q 6 hr at [place] + arginaid packet BID. NFPE done 1/4/21 no wasting seen. NPO x 1 day inpatient. Wt fluctuations x 1  year.  1/10/21 Pt restarted at 20 ml/hr , was tolerating TF @ goal yesterday. This morning suctioned contents appeared to be TF, so TF was held and plan for KUB before restarting.  22 P/t still off TF, plan to advance TF sometime today (22). Pt had large residual 1/10. Possible cholecystitis ordering HIDA scan. P/t still has mechanical ventilator @ trachea.     Nutrition Discharge Planning: To be determined- TF PTA Isosource 1.5 @ 20 ml/hr advancing to goal of 50 ml/hr + 250 ml flush q 6 hr + Benny or arginaid BID     Nutrition Risk Screen     Nutrition Risk Screen: tube feeding or parenteral nutrition,dysphagia or difficulty swallowing     Nutrition/Diet History     Patient Reported Diet/Restrictions/Preferences: no oral intake  Spiritual, Cultural Beliefs, Jainism Practices, Values that Affect Care: no  Food Allergies: NKFA  Factors Affecting Nutritional Intake: NPO,difficulty/impaired swallowing     Anthropometrics  Height Method: Stated  Height: 5'  Height (inches): 60 in  Weight Method: Bed Scale  Weight: 76.6 kg 22, 72.3 kg 1/10/21, 65.7 kg 21,   Weight (lb): 168 lb (22) 159 lb  Ideal Body Weight (IBW), Female: 100 lb  BMI (Calculated): 28 kg/m2 admission  BMI Grade: 25 - 29.9 - overweight  Weight Loss: unintentional  Usual Body Weight (UBW), k.1 kg (20)  59.4 kg (10/25/21), 62.3 kg 21     Lab/Procedures/Meds     Pertinent Labs Reviewed: reviewed  BMP  Lab Results   Component Value Date     (L) 2022    K 3.6 2022    CL 99 2022    CO2 26 2022    BUN 7 2022    CREATININE 0.4 (L) 2022    CALCIUM 8.7 2022    ANIONGAP 9 2022    ESTGFRAFRICA >60 2022    EGFRNONAA >60 2022     POCT Glucose   Date Value Ref Range Status   2022 105 70 - 110 mg/dL Final   2022 111 (H) 70 - 110 mg/dL Final   2022 102 70 - 110 mg/dL Final   2022 85 70 - 110 mg/dL Final   2022 88 70 - 110 mg/dL Final    01/10/2022 85 70 - 110 mg/dL Final   01/10/2022 86 70 - 110 mg/dL Final   01/10/2022 78 70 - 110 mg/dL Final   01/10/2022 120 (H) 70 - 110 mg/dL Final   01/09/2022 76 70 - 110 mg/dL Final   01/09/2022 34 (LL) 70 - 110 mg/dL Final   01/09/2022 84 70 - 110 mg/dL Final   01/09/2022 106 70 - 110 mg/dL Final     Lab Results   Component Value Date    CALCIUM 8.7 01/12/2022    PHOS 2.5 (L) 01/12/2022       Pertinent Medications Reviewed: reviewed   Famotidine, dextrose 5% & 4.5% NaCl infusion (continuous), ondansetron, polyethylene glycol     Estimated/Assessed Needs     Weight Used For Calorie Calculations: 65.7 kg  Energy Calorie Requirements (kcal): MSJ ( x 1.25) = 7563-5807 kcal  Energy Need Method: [facility]  Protein Requirements: 1.5 g protein/kg ( wound/ICU) = 98 g  Weight Used For Protein Calculations: 65.7 kg  Fluid Requirements (mL): 1400 ml or per MD  Estimated Fluid Requirement Method: RDA Method  CHO Requirement: N/A        Nutrition Prescription Ordered     Current Diet Order: NPO      Evaluation of Received Nutrient/Fluid Intake     Energy Calories Required: not meeting needs  Protein Required: not meeting needs  Fluid Required: exceeding needs  IVF @ 100 ml/hr  Tolerance: NPO  % Intake of Estimated Energy Needs: 0%  % Meal Intake:NPO       Nutrition Risk     Level of Risk/Frequency of Follow-up: moderate 2 x weekly        Monitor and Evaluation     Food and Nutrient Intake: energy intake  Food and Nutrient Adminstration: enteral and parenteral nutrition administration  Anthropometric Measurements: weight  Biochemical Data, Medical Tests and Procedures: electrolyte and renal panel,gastrointestinal profile,glucose/endocrine profile  Nutrition-Focused Physical Findings: overall appearance         Nutrition Follow-Up    RD Follow-up?: Yes

## 2022-01-12 NOTE — PLAN OF CARE
Pt continues on current POC. Pt continues to be NPO until further evaluation by ABY Espinal. Safety measures active, will continue to monitor.  Problem: Communication Impairment (Mechanical Ventilation, Invasive)  Goal: Effective Communication  Outcome: Ongoing, Progressing     Problem: Device-Related Complication Risk (Mechanical Ventilation, Invasive)  Goal: Optimal Device Function  Outcome: Ongoing, Progressing     Problem: Inability to Wean (Mechanical Ventilation, Invasive)  Goal: Mechanical Ventilation Liberation  Outcome: Ongoing, Progressing     Problem: Nutrition Impairment (Mechanical Ventilation, Invasive)  Goal: Optimal Nutrition Delivery  Outcome: Ongoing, Progressing     Problem: Skin and Tissue Injury (Mechanical Ventilation, Invasive)  Goal: Absence of Device-Related Skin and Tissue Injury  Outcome: Ongoing, Progressing     Problem: Ventilator-Induced Lung Injury (Mechanical Ventilation, Invasive)  Goal: Absence of Ventilator-Induced Lung Injury  Outcome: Ongoing, Progressing     Problem: Communication Impairment (Artificial Airway)  Goal: Effective Communication  Outcome: Ongoing, Progressing     Problem: Device-Related Complication Risk (Artificial Airway)  Goal: Optimal Device Function  Outcome: Ongoing, Progressing     Problem: Skin and Tissue Injury (Artificial Airway)  Goal: Absence of Device-Related Skin or Tissue Injury  Outcome: Ongoing, Progressing     Problem: Noninvasive Ventilation Acute  Goal: Effective Unassisted Ventilation and Oxygenation  Outcome: Ongoing, Progressing     Problem: Adult Inpatient Plan of Care  Goal: Plan of Care Review  Outcome: Ongoing, Progressing  Goal: Patient-Specific Goal (Individualized)  Outcome: Ongoing, Progressing  Goal: Absence of Hospital-Acquired Illness or Injury  Outcome: Ongoing, Progressing  Goal: Optimal Comfort and Wellbeing  Outcome: Ongoing, Progressing  Goal: Readiness for Transition of Care  Outcome: Ongoing, Progressing     Problem:  Adjustment to Illness (Delirium)  Goal: Optimal Coping  Outcome: Ongoing, Progressing     Problem: Altered Behavior (Delirium)  Goal: Improved Behavioral Control  Outcome: Ongoing, Progressing     Problem: Attention and Thought Clarity Impairment (Delirium)  Goal: Improved Attention and Thought Clarity  Outcome: Ongoing, Progressing     Problem: Sleep Disturbance (Delirium)  Goal: Improved Sleep  Outcome: Ongoing, Progressing     Problem: Adjustment to Illness (Sepsis/Septic Shock)  Goal: Optimal Coping  Outcome: Ongoing, Progressing     Problem: Bleeding (Sepsis/Septic Shock)  Goal: Absence of Bleeding  Outcome: Ongoing, Progressing     Problem: Glycemic Control Impaired (Sepsis/Septic Shock)  Goal: Blood Glucose Level Within Desired Range  Outcome: Ongoing, Progressing     Problem: Infection Progression (Sepsis/Septic Shock)  Goal: Absence of Infection Signs and Symptoms  Outcome: Ongoing, Progressing     Problem: Nutrition Impaired (Sepsis/Septic Shock)  Goal: Optimal Nutrition Intake  Outcome: Ongoing, Progressing     Problem: Impaired Wound Healing  Goal: Optimal Wound Healing  Outcome: Ongoing, Progressing     Problem: Fall Injury Risk  Goal: Absence of Fall and Fall-Related Injury  Outcome: Ongoing, Progressing     Problem: Skin Injury Risk Increased  Goal: Skin Health and Integrity  Outcome: Ongoing, Progressing     Problem: Infection  Goal: Absence of Infection Signs and Symptoms  Outcome: Ongoing, Progressing     Problem: Aspiration (Enteral Nutrition)  Goal: Absence of Aspiration Signs and Symptoms  Outcome: Ongoing, Progressing     Problem: Device-Related Complication Risk (Enteral Nutrition)  Goal: Safe, Effective Therapy Delivery  Outcome: Ongoing, Progressing     Problem: Feeding Intolerance (Enteral Nutrition)  Goal: Feeding Tolerance  Outcome: Ongoing, Progressing

## 2022-01-12 NOTE — CHAPLAIN
Visited pt, held 1-way conversation, provided compassionate presence and tender touch, reminding her she's loved and being well taken care of; stated (per Epic) that she identifies as Gnosticist Zoroastrian. Prayed over/with/for her as I laid my hand upon her forehead; stayed a while in silence, with my hand on her arm. Lord, in your mercy.

## 2022-01-12 NOTE — PLAN OF CARE
Pt remains in ICU. Afebrile, warmer blanket on low setting. Core rectal temp being coninuously monitored.  Responds to pain. Trach to vent. K+ & Mag replaced. CBG monitored per order with no SSI needed this shift.TF restarted to PEG. Will keep at 10 ml/hr/ trickle feed as pt will be NPO and TF to stop at midnight for IR procedure tomorrow 1/13/22. VSS. [place] in place with adequate U/O. No BM this shift. RN to hold Lovenox dose for 12 hrs prior to procedure (placed hold on med in pt's MAR). Pt remains free of any falls, injuries, or new skin breakdown this shift with precautions in place for all.

## 2022-01-12 NOTE — SUBJECTIVE & OBJECTIVE
Interval History: Pulmonology following. Sputum culture grew Pseudomonas, Klebsiella pneumoniae (ESBL species) and Serratia species. Blood culture with CoNS- contaminant.  Urine with Proteus species.  Peg tube feeding at goal.  Significant gastric residual noted.  Pulmonary secretions suctioning gastric contents.  Tube feed is being held.  CT abdomen suggested acute cholecystitis.  Interventional Radiology is being consulted for percutaneous cholecystostomy drain placement.    Review of Systems   Unable to perform ROS: Patient nonverbal     Objective:     Vital Signs (Most Recent):  Temp: 98.42 °F (36.9 °C) (01/12/22 0903)  Pulse: 98 (01/12/22 0903)  Resp: (!) 24 (01/12/22 0917)  BP: 127/69 (01/12/22 0430)  SpO2: 98 % (01/12/22 0903) Vital Signs (24h Range):  Temp:  [72.14 °F (22.3 °C)-99.1 °F (37.3 °C)] 98.42 °F (36.9 °C)  Pulse:  [] 98  Resp:  [18-35] 24  SpO2:  [93 %-100 %] 98 %  BP: (109-145)/(58-83) 127/69     Weight: 76.6 kg (168 lb 14 oz)  Body mass index is 32.98 kg/m².    Intake/Output Summary (Last 24 hours) at 1/12/2022 1027  Last data filed at 1/12/2022 0420  Gross per 24 hour   Intake 1109.88 ml   Output 2250 ml   Net -1140.12 ml      Physical Exam  Vitals and nursing note reviewed.   Constitutional:       General: She is not in acute distress.     Appearance: She is well-developed. She is ill-appearing.      Comments: Chronic trach/vent dependent   HENT:      Head: Normocephalic and atraumatic.      Mouth/Throat:      Mouth: Mucous membranes are dry.   Cardiovascular:      Rate and Rhythm: Normal rate and regular rhythm.   Pulmonary:      Effort: Pulmonary effort is normal. No respiratory distress.      Breath sounds: Rhonchi present.   Abdominal:      General: Bowel sounds are normal.      Palpations: Abdomen is soft.      Tenderness: There is no abdominal tenderness.      Comments: g tube in place   Musculoskeletal:      Cervical back: Normal range of motion and neck supple.      Comments:  Contractures to hands   Skin:     General: Skin is warm and dry.   Neurological:      GCS: GCS eye subscore is 1. GCS verbal subscore is 1. GCS motor subscore is 1.   Psychiatric:      Comments: Unable to assess         Significant Labs:  Lab Results   Component Value Date    WBC 5.97 01/12/2022    HGB 7.6 (L) 01/12/2022    HCT 24.3 (L) 01/12/2022    MCV 88 01/12/2022     01/12/2022       CMP  Sodium   Date Value Ref Range Status   01/12/2022 134 (L) 136 - 145 mmol/L Final     Potassium   Date Value Ref Range Status   01/12/2022 3.6 3.5 - 5.1 mmol/L Final     Chloride   Date Value Ref Range Status   01/12/2022 99 95 - 110 mmol/L Final     CO2   Date Value Ref Range Status   01/12/2022 26 23 - 29 mmol/L Final     Glucose   Date Value Ref Range Status   01/12/2022 86 70 - 110 mg/dL Final     BUN   Date Value Ref Range Status   01/12/2022 7 6 - 20 mg/dL Final     Creatinine   Date Value Ref Range Status   01/12/2022 0.4 (L) 0.5 - 1.4 mg/dL Final     Calcium   Date Value Ref Range Status   01/12/2022 8.7 8.7 - 10.5 mg/dL Final     Total Protein   Date Value Ref Range Status   01/12/2022 6.1 6.0 - 8.4 g/dL Final     Albumin   Date Value Ref Range Status   01/12/2022 1.9 (L) 3.5 - 5.2 g/dL Final     Total Bilirubin   Date Value Ref Range Status   01/12/2022 0.5 0.1 - 1.0 mg/dL Final     Comment:     For infants and newborns, interpretation of results should be based  on gestational age, weight and in agreement with clinical  observations.    Premature Infant recommended reference ranges:  Up to 24 hours.............<8.0 mg/dL  Up to 48 hours............<12.0 mg/dL  3-5 days..................<15.0 mg/dL  6-29 days.................<15.0 mg/dL       Alkaline Phosphatase   Date Value Ref Range Status   01/12/2022 144 (H) 55 - 135 U/L Final     AST   Date Value Ref Range Status   01/12/2022 10 10 - 40 U/L Final     ALT   Date Value Ref Range Status   01/12/2022 12 10 - 44 U/L Final     Anion Gap   Date Value Ref Range  Status   01/12/2022 9 8 - 16 mmol/L Final     eGFR if    Date Value Ref Range Status   01/12/2022 >60 >60 mL/min/1.73 m^2 Final     eGFR if non    Date Value Ref Range Status   01/12/2022 >60 >60 mL/min/1.73 m^2 Final     Comment:     Calculation used to obtain the estimated glomerular filtration  rate (eGFR) is the CKD-EPI equation.        Microbiology Results (last 7 days)     Procedure Component Value Units Date/Time    Culture, Respiratory with Gram Stain [[id number]]  (Abnormal)  (Susceptibility) Collected: 01/05/22 2009    Order Status: Completed Specimen: Respiratory from Endotracheal Aspirate Updated: 01/11/22 0637     Respiratory Culture No S aureus isolated.      SERRATIA MARCESCENS  Many        KLEBSIELLA PNEUMONIAE ESBL  Moderate        PSEUDOMONAS AERUGINOSA   Moderate       Gram Stain (Respiratory) <10 epithelial cells per low power field.     Gram Stain (Respiratory) Rare WBC's     Gram Stain (Respiratory) Few Gram negative rods     Gram Stain (Respiratory) Rare yeast    Blood culture x two cultures. Draw prior to antibiotics. [[id number]] Collected: 01/03/22 1950    Order Status: Completed Specimen: Blood Updated: 01/09/22 0612     Blood Culture, Routine No growth after 5 days.    Narrative:      Aerobic and anaerobic    Urine culture [[id number]]  (Abnormal)  (Susceptibility) Collected: 01/03/22 1943    Order Status: Completed Specimen: Urine Updated: 01/07/22 1553     Urine Culture, Routine PROTEUS MIRABILIS  >100,000 cfu/ml      Narrative:      Specimen Source->Urine    Blood culture x two cultures. Draw prior to antibiotics. [[id number]]  (Abnormal) Collected: 01/03/22 1950    Order Status: Completed Specimen: Blood Updated: 01/06/22 1313     Blood Culture, Routine Gram stain peds bottle: Gram positive cocci in clusters resembling Staph      Results called to and read back by:Yvonne Gamez RN 01/04/2022  23:08      COAGULASE-NEGATIVE STAPHYLOCOCCUS  SPECIES  Organism is a probable contaminant      Narrative:      Aerobic and anaerobic          Significant Imaging:  CXR:  Worsening airspace opacities in bilateral lungs, suspicious for multifocal pneumonia.    CXR: Resolving intrapulmonary infiltrates with a small amount of consolidation remaining at the right lung base. Tracheostomy tube in position.    KUB: No acute abdominal process.    CXR: Resolving intrapulmonary infiltrates with a small amount of consolidation remaining at the right lung base. Tracheostomy tube in position.    CXR: Right lung infiltrate without significant change.    KUB:  No acute abdominal process.    CT abdomen and pelvis with contrast:  Features of acute cholecystitis.  Two stones appear present within the cystic duct.  No common duct stones identified. Small bilateral pleural effusions and patchy bibasilar airspace disease compatible with pneumonia.

## 2022-01-12 NOTE — CARE UPDATE
01/11/22 1904   Patient Assessment/Suction   Level of Consciousness (AVPU) responds to voice   Respiratory Effort Unlabored   Expansion/Accessory Muscles/Retractions no use of accessory muscles   All Lung Fields Breath Sounds rhonchi   Rhythm/Pattern, Respiratory assisted mechanically   Cough Frequency with stimulation   Cough Type assisted   Suction Method tracheal   $ Suction Charges Inline Suction Procedure Stat Charge   Secretions Amount moderate   Secretions Color creamy;pale;yellow   Secretions Characteristics thick   PRE-TX-O2   O2 Device (Oxygen Therapy) ventilator   $ Is the patient on Low Flow Oxygen? Yes   Oxygen Concentration (%) 50   SpO2 98 %   Pulse Oximetry Type Continuous   $ Pulse Oximetry - Multiple Charge Pulse Oximetry - Multiple   Pulse 85   Resp (!) 25   Temp 96.44 °F (35.8 °C)   ETCO2   $ ETCO2 Usage Currently wearing   ETCO2 (mmHg) 30 mmHg   ETCO2 Device Type Bedside Monitor;Artificial Airway;Ventilator   Aerosol Therapy   $ Aerosol Therapy Charges Aerosol Treatment   Respiratory Treatment Status (SVN) given   Treatment Route (SVN) in-line   Patient Position (SVN) HOB elevated   Post Treatment Assessment (SVN) breath sounds unchanged   Signs of Intolerance (SVN) none   Breath Sounds Post-Respiratory Treatment   Throughout All Fields Post-Treatment All Fields   Throughout All Fields Post-Treatment no change   Post-treatment Heart Rate (beats/min) 88   Post-treatment Resp Rate (breaths/min) 25   Skin Integrity   $ Wound Care Tech Time 15 min   Area Observed Neck under tracheostomy   Skin Appearance without discoloration   Barrier used?   (drain sponge)        Surgical Airway Portex Cuffed   No placement date or time found.   Present Prior to Hospital Arrival?: Yes  Type: Tracheostomy  Brand: Portex  Airway Device Size: 8.0  Style: Cuffed   Cuff Pressure 32 cm H2O   Cuff Inflation? Inflated   Status Secured   Site Assessment Clean;Dry   Airway Safety   Ambu bag with the patient? Yes, Adult  Ambu   Is mask with the patient? Yes, Adult Mask   Vent Select   Conventional Vent Y   Charged w/in last 24h YES   Preset Conventional Ventilator Settings   Vent Type NKV-550   Ventilation Type VC   Vent Mode A/CMV-VC   Humidity HME   Set Rate 24 BPM   Vt Set 350 mL   PEEP/CPAP 4.1 cmH20   Insp Time 0.8 Sec(s)   I-Trigger Type  Flow   Trigger Sensitivity Flow/I-Trigger 2.5 L/min   Patient Ventilator Parameters   Resp Rate Total 25 br/min   Peak Airway Pressure 35.7 cmH2O   Mean Airway Pressure 12.9 cmH20   Exhaled Vt 325 mL   Total Ve 7.85 mL   I:E Ratio Measured 1:2.1   Total PEEP 6.1 cmH20   Tube Type Trach   Inspired Tidal Volume (VTI) 350 mL   Conventional Ventilator Alarms   Alarms On Y   Ve High Alarm 20 L/min   Ve Low Alarm 3 L/min   Vt High Alarm 15 mL   Vt Low Alarm 4 mL   Resp Rate High Alarm 50 br/min   Press High Alarm 60 cmH2O   Apnea Rate 15   Apnea Volume (mL) 450 mL   Ready to Wean/Extubation Screen   FIO2<=50 (chart decimal) 0.5   MV<16L (chart vol.) 7.85   PEEP <=8 (chart #) 4.1   Ready to Wean Parameters   F/VT Ratio<105 (RSBI) (!) 76.92

## 2022-01-12 NOTE — PROGRESS NOTES
Progress Note  PULMONARY    Admit Date: 1/3/2022   01/12/2022      SUBJECTIVE:     1/5- no new issues  1/6- no new issues  1/7- remains on vent, GNR on sputum culture, CXR better. Per RN multiple episodes emesis so tube feeds are held  1/8- no new issues  1/9- no new issues  1/10- no new issues  1/11- yesterday had emesis, aspiration event. abg looks ok. Remains on vent  1/12- no new issues. Per nurse there is plan for cholecystectomy?      OBJECTIVE:     Vitals (Most recent):  Vitals:    01/12/22 1530   BP:    Pulse:    Resp: (!) 26   Temp:        Vitals (24 hour range):  Temp:  [95.9 °F (35.5 °C)-99.1 °F (37.3 °C)]   Pulse:  []   Resp:  [18-35]   BP: (111-145)/(59-83)   SpO2:  [93 %-100 %]       Intake/Output Summary (Last 24 hours) at 1/12/2022 1555  Last data filed at 1/12/2022 1400  Gross per 24 hour   Intake 3668.88 ml   Output 1500 ml   Net 2168.88 ml          Physical Exam:  The patient's neuro status (alertness,orientation,cognitive function,motor skills,), pharyngeal exam (oral lesions, hygiene, abn dentition,), Neck (jvd,mass,thyroid,nodes in neck and above/below clavicle),RESPIRATORY(symmetry,effort,fremitus,percussion,auscultation),  Cor(rhythm,heart tones including gallops,perfusion,edema)ABD(distention,hepatic&splenomegaly,tenderness,masses), Skin(rash,cyanosis),Psyc(affect,judgement,).  Exam negative except for these pertinent findings:    Unresponsive, eyes open and deviate to right, grimaces- sensitive to touch  Trach w vent  Bilateral coarse breath sounds  Abdomen soft, distended, hypoactive bowel sounds, PEG in place  Contracted upper ext  L hand dressed        Radiographs reviewed: view by direct vision   CXR 1/9- worsened appearance R mid/lower lung infiltrates  CXR 1/7- improved infiltrates  CXR 1/3/22- bilateral lower lobe infiltrates, worsened compared to previous film    Labs     Recent Labs   Lab 01/12/22  0330   WBC 5.97   HGB 7.6*   HCT 24.3*        Recent Labs   Lab  01/12/22  0330   *   K 3.6   CL 99   CO2 26   BUN 7   CREATININE 0.4*   GLU 86   CALCIUM 8.7   MG 1.5*   PHOS 2.5*   AST 10   ALT 12   ALKPHOS 144*   BILITOT 0.5   PROT 6.1   ALBUMIN 1.9*     No results for input(s): PH, PCO2, PO2, HCO3 in the last 24 hours.  Microbiology Results (last 7 days)     Procedure Component Value Units Date/Time    Blood culture [[id number]]     Order Status: Sent Specimen: Blood     Blood culture [[id number]]     Order Status: Sent Specimen: Blood     Culture, Respiratory with Gram Stain [[id number]]  (Abnormal)  (Susceptibility) Collected: 01/05/22 2009    Order Status: Completed Specimen: Respiratory from Endotracheal Aspirate Updated: 01/11/22 0637     Respiratory Culture No S aureus isolated.      SERRATIA MARCESCENS  Many        KLEBSIELLA PNEUMONIAE ESBL  Moderate        PSEUDOMONAS AERUGINOSA   Moderate       Gram Stain (Respiratory) <10 epithelial cells per low power field.     Gram Stain (Respiratory) Rare WBC's     Gram Stain (Respiratory) Few Gram negative rods     Gram Stain (Respiratory) Rare yeast    Blood culture x two cultures. Draw prior to antibiotics. [[id number]] Collected: 01/03/22 1950    Order Status: Completed Specimen: Blood Updated: 01/09/22 0612     Blood Culture, Routine No growth after 5 days.    Narrative:      Aerobic and anaerobic    Urine culture [[id number]]  (Abnormal)  (Susceptibility) Collected: 01/03/22 1943    Order Status: Completed Specimen: Urine Updated: 01/07/22 1553     Urine Culture, Routine PROTEUS MIRABILIS  >100,000 cfu/ml      Narrative:      Specimen Source->Urine    Blood culture x two cultures. Draw prior to antibiotics. [[id number]]  (Abnormal) Collected: 01/03/22 1950    Order Status: Completed Specimen: Blood Updated: 01/06/22 1313     Blood Culture, Routine Gram stain peds bottle: Gram positive cocci in clusters resembling Staph      Results called to and read back by:Yvonne Gamez RN 01/04/2022  23:08      COAGULASE-NEGATIVE  STAPHYLOCOCCUS SPECIES  Organism is a probable contaminant      Narrative:      Aerobic and anaerobic          Impression:  Active Hospital Problems    Diagnosis  POA    *Sepsis [A41.9]  Yes    Acute cholecystitis [K81.0]  No    Hyponatremia [E87.1]  Yes    UTI (urinary tract infection) [N39.0]  Yes    Ventilator associated pneumonia [J95.851]  Yes    Essential hypertension [I10]  Yes    Gastroesophageal reflux disease [K21.9]  Yes    Malnutrition of moderate degree [E44.0]  Yes    Chronic respiratory failure with hypoxia [J96.11]  Yes    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  Added automatically from request for surgery [id number]        Resolved Hospital Problems   No resolved problems to display.               Plan:     - continue vent, no weaning  - continue meropenem  - lovenox subq for DVT prophylaxis  - pepcid for GI prophylaxis  - wound care   - continue midodrine  - monitor Hgb  - tube feeds restart when appropriate  - surgery following and may need intervention for cholecystitis    Dora Montesinos MD  Pulmonary & Critical Care Medicine                            .

## 2022-01-12 NOTE — PROGRESS NOTES
[facility] Medicine  Progress Note    Patient Name: Genna Felix  MRN: [id number]  Patient Class: IP- Inpatient   Admission Date: 1/3/2022  Length of Stay: 9 days  Attending Physician: Geeta Siddiqi MD  Primary Care Provider: Primary Doctor No        Subjective:     Principal Problem:Sepsis        HPI:  Genna Felix is a 61 year old female with a past medical history of anoxic brain injury s/p G tube and tracheostomy, PE, HTN, GERD who presented from [place] for further evaluation of tachycardia.  She was discharged from this facility on 12/27/21 with a diagnosis of pneumonia with rx for bactrim.  In the ED she is found to be tachycardic with WBC 14K, lactic acid 2.6 and evidence of UTI.  CXR reveals worsening multifocal pneumonia.  Pt is nonverbal and cannot contribute to history of present illness, therefore further information is limited.  While in the ED, IV zosyn was initiated and she was give an IVF bolus.  She will be admitted to the service of hospital medicine for continued monitoring and treatment.      Overview/Hospital Course:  61-year-old female with past medical history of anoxic brain injury, PE, hypertension, GERD admitted to the hospital medicine service for UTI and worsening multifocal pneumonia.  She was placed on broad-spectrum antibiotics.  Pulmonologist was consulted.  Urine culture grew presumptive Proteus.  Sputum culture currently with few Gram-negative rods.      Interval History: Pulmonology following. Sputum culture grew Pseudomonas, Klebsiella pneumoniae (ESBL species) and Serratia species. Blood culture with CoNS- contaminant.  Urine with Proteus species.  Peg tube feeding at goal.  Significant gastric residual noted.  Pulmonary secretions suctioning gastric contents.  Tube feed is being held.  CT abdomen suggested acute cholecystitis.  Interventional Radiology is being consulted for percutaneous cholecystostomy drain placement.    Review of Systems   Unable  to perform ROS: Patient nonverbal     Objective:     Vital Signs (Most Recent):  Temp: 98.42 °F (36.9 °C) (01/12/22 0903)  Pulse: 98 (01/12/22 0903)  Resp: (!) 24 (01/12/22 0917)  BP: 127/69 (01/12/22 0430)  SpO2: 98 % (01/12/22 0903) Vital Signs (24h Range):  Temp:  [72.14 °F (22.3 °C)-99.1 °F (37.3 °C)] 98.42 °F (36.9 °C)  Pulse:  [] 98  Resp:  [18-35] 24  SpO2:  [93 %-100 %] 98 %  BP: (109-145)/(58-83) 127/69     Weight: 76.6 kg (168 lb 14 oz)  Body mass index is 32.98 kg/m².    Intake/Output Summary (Last 24 hours) at 1/12/2022 1027  Last data filed at 1/12/2022 0420  Gross per 24 hour   Intake 1109.88 ml   Output 2250 ml   Net -1140.12 ml      Physical Exam  Vitals and nursing note reviewed.   Constitutional:       General: She is not in acute distress.     Appearance: She is well-developed. She is ill-appearing.      Comments: Chronic trach/vent dependent   HENT:      Head: Normocephalic and atraumatic.      Mouth/Throat:      Mouth: Mucous membranes are dry.   Cardiovascular:      Rate and Rhythm: Normal rate and regular rhythm.   Pulmonary:      Effort: Pulmonary effort is normal. No respiratory distress.      Breath sounds: Rhonchi present.   Abdominal:      General: Bowel sounds are normal.      Palpations: Abdomen is soft.      Tenderness: There is no abdominal tenderness.      Comments: g tube in place   Musculoskeletal:      Cervical back: Normal range of motion and neck supple.      Comments: Contractures to hands   Skin:     General: Skin is warm and dry.   Neurological:      GCS: GCS eye subscore is 1. GCS verbal subscore is 1. GCS motor subscore is 1.   Psychiatric:      Comments: Unable to assess         Significant Labs:  Lab Results   Component Value Date    WBC 5.97 01/12/2022    HGB 7.6 (L) 01/12/2022    HCT 24.3 (L) 01/12/2022    MCV 88 01/12/2022     01/12/2022       CMP  Sodium   Date Value Ref Range Status   01/12/2022 134 (L) 136 - 145 mmol/L Final     Potassium   Date Value  Ref Range Status   01/12/2022 3.6 3.5 - 5.1 mmol/L Final     Chloride   Date Value Ref Range Status   01/12/2022 99 95 - 110 mmol/L Final     CO2   Date Value Ref Range Status   01/12/2022 26 23 - 29 mmol/L Final     Glucose   Date Value Ref Range Status   01/12/2022 86 70 - 110 mg/dL Final     BUN   Date Value Ref Range Status   01/12/2022 7 6 - 20 mg/dL Final     Creatinine   Date Value Ref Range Status   01/12/2022 0.4 (L) 0.5 - 1.4 mg/dL Final     Calcium   Date Value Ref Range Status   01/12/2022 8.7 8.7 - 10.5 mg/dL Final     Total Protein   Date Value Ref Range Status   01/12/2022 6.1 6.0 - 8.4 g/dL Final     Albumin   Date Value Ref Range Status   01/12/2022 1.9 (L) 3.5 - 5.2 g/dL Final     Total Bilirubin   Date Value Ref Range Status   01/12/2022 0.5 0.1 - 1.0 mg/dL Final     Comment:     For infants and newborns, interpretation of results should be based  on gestational age, weight and in agreement with clinical  observations.    Premature Infant recommended reference ranges:  Up to 24 hours.............<8.0 mg/dL  Up to 48 hours............<12.0 mg/dL  3-5 days..................<15.0 mg/dL  6-29 days.................<15.0 mg/dL       Alkaline Phosphatase   Date Value Ref Range Status   01/12/2022 144 (H) 55 - 135 U/L Final     AST   Date Value Ref Range Status   01/12/2022 10 10 - 40 U/L Final     ALT   Date Value Ref Range Status   01/12/2022 12 10 - 44 U/L Final     Anion Gap   Date Value Ref Range Status   01/12/2022 9 8 - 16 mmol/L Final     eGFR if    Date Value Ref Range Status   01/12/2022 >60 >60 mL/min/1.73 m^2 Final     eGFR if non    Date Value Ref Range Status   01/12/2022 >60 >60 mL/min/1.73 m^2 Final     Comment:     Calculation used to obtain the estimated glomerular filtration  rate (eGFR) is the CKD-EPI equation.        Microbiology Results (last 7 days)     Procedure Component Value Units Date/Time    Culture, Respiratory with Gram Stain [[id number]]   (Abnormal)  (Susceptibility) Collected: 01/05/22 2009    Order Status: Completed Specimen: Respiratory from Endotracheal Aspirate Updated: 01/11/22 0637     Respiratory Culture No S aureus isolated.      SERRATIA MARCESCENS  Many        KLEBSIELLA PNEUMONIAE ESBL  Moderate        PSEUDOMONAS AERUGINOSA   Moderate       Gram Stain (Respiratory) <10 epithelial cells per low power field.     Gram Stain (Respiratory) Rare WBC's     Gram Stain (Respiratory) Few Gram negative rods     Gram Stain (Respiratory) Rare yeast    Blood culture x two cultures. Draw prior to antibiotics. [[id number]] Collected: 01/03/22 1950    Order Status: Completed Specimen: Blood Updated: 01/09/22 0612     Blood Culture, Routine No growth after 5 days.    Narrative:      Aerobic and anaerobic    Urine culture [[id number]]  (Abnormal)  (Susceptibility) Collected: 01/03/22 1943    Order Status: Completed Specimen: Urine Updated: 01/07/22 1553     Urine Culture, Routine PROTEUS MIRABILIS  >100,000 cfu/ml      Narrative:      Specimen Source->Urine    Blood culture x two cultures. Draw prior to antibiotics. [[id number]]  (Abnormal) Collected: 01/03/22 1950    Order Status: Completed Specimen: Blood Updated: 01/06/22 1313     Blood Culture, Routine Gram stain peds bottle: Gram positive cocci in clusters resembling Staph      Results called to and read back by:Yvonne Gamez RN 01/04/2022  23:08      COAGULASE-NEGATIVE STAPHYLOCOCCUS SPECIES  Organism is a probable contaminant      Narrative:      Aerobic and anaerobic          Significant Imaging:  CXR:  Worsening airspace opacities in bilateral lungs, suspicious for multifocal pneumonia.    CXR: Resolving intrapulmonary infiltrates with a small amount of consolidation remaining at the right lung base. Tracheostomy tube in position.    KUB: No acute abdominal process.    CXR: Resolving intrapulmonary infiltrates with a small amount of consolidation remaining at the right lung base. Tracheostomy  "tube in position.    CXR: Right lung infiltrate without significant change.    KUB:  No acute abdominal process.    CT abdomen and pelvis with contrast:  Features of acute cholecystitis.  Two stones appear present within the cystic duct.  No common duct stones identified. Small bilateral pleural effusions and patchy bibasilar airspace disease compatible with pneumonia.      Assessment/Plan:      * Sepsis  This patient does have evidence of infective focus  My overall impression is sepsis. Vital signs were reviewed and noted in progress note.  Antibiotics given-   Antibiotics (From admission, onward)            Start     Stop Route Frequency Ordered    01/06/22 0630  vancomycin 1.25 g in dextrose 5% 250 mL IVPB (ready to mix)         -- IV Every 18 hours 01/06/22 0611    01/04/22 0900  mupirocin 2 % ointment         01/09 0859 Nasl 2 times daily 01/04/22 0142    01/04/22 0400  piperacillin-tazobactam 4.5 g in dextrose 5 % 100 mL IVPB (ready to mix system)         -- IV Every 8 hours (non-standard times) 01/03/22 2141    01/03/22 2241  vancomycin - pharmacy to dose  (vancomycin IVPB)        "And" Linked Group Details    -- IV pharmacy to manage frequency 01/03/22 2141        Cultures were taken-   Microbiology Results (last 7 days)     Procedure Component Value Units Date/Time    Culture, Respiratory with Gram Stain [[id number]]  (Abnormal) Collected: 01/05/22 2009    Order Status: Completed Specimen: Respiratory from Endotracheal Aspirate Updated: 01/07/22 1035     Respiratory Culture GRAM NEGATIVE CHRISTINA  Many  Identification and susceptibility pending       Gram Stain (Respiratory) <10 epithelial cells per low power field.     Gram Stain (Respiratory) Rare WBC's     Gram Stain (Respiratory) Few Gram negative rods     Gram Stain (Respiratory) Rare yeast    Blood culture x two cultures. Draw prior to antibiotics. [[id number]] Collected: 01/03/22 1950    Order Status: Completed Specimen: Blood Updated: 01/07/22 0612     " Blood Culture, Routine No Growth to date      No Growth to date      No Growth to date      No Growth to date    Narrative:      Aerobic and anaerobic    Urine culture [[id number]]  (Abnormal)  (Susceptibility) Collected: 01/03/22 1943    Order Status: Completed Specimen: Urine Updated: 01/06/22 1535     Urine Culture, Routine PROTEUS MIRABILIS  >100,000 cfu/ml  Identification and susceptibility pending      Narrative:      Specimen Source->Urine    Blood culture x two cultures. Draw prior to antibiotics. [[id number]]  (Abnormal) Collected: 01/03/22 1950    Order Status: Completed Specimen: Blood Updated: 01/06/22 1313     Blood Culture, Routine Gram stain peds bottle: Gram positive cocci in clusters resembling Staph      Results called to and read back by:Yvonne Gamez RN 01/04/2022  23:08      COAGULASE-NEGATIVE STAPHYLOCOCCUS SPECIES  Organism is a probable contaminant      Narrative:      Aerobic and anaerobic        Latest lactate reviewed, they are-  No results for input(s): LACTATE in the last 72 hours.    Organ dysfunction indicated by tachycardia, tachypnea  Source- respiratory, urine      Source control Achieved by- IV abx      Acute cholecystitis  Continue intravenous antibiotics.  Consulting Infectious Disease specialist as well.  Patient to undergo percutaneous cholecystostomy drain as per recommendations by General surgery.      Hyponatremia  Follow BMP.       UTI (urinary tract infection)  IV meropenem; follow culture        Pressure injury of left buttock, stage 4  Consult wound care  Turn q2h        Ventilator associated pneumonia  Failed OP abx  Pt vent dependent-consult pulmonology for vent management  On intravenous meropenem and vancomycin.  Blood and sputum cx  Nebulizer treatments        Gastroesophageal reflux disease  Chronic; utilize pepcid acutely.      Essential hypertension  Chronic, stable.  Latest blood pressure and vitals reviewed-   Temp:  [96.08 °F (35.6 °C)-98.24 °F (36.8 °C)]    Pulse:  []   Resp:  [10-75]   BP: (113-160)/(62-98)   SpO2:  [94 %-99 %] .   Home meds for hypertension were reviewed and noted below.   No chronic antihypertensives noted. Review of home meds reveals midodrine which has been reordered.      Will utilize p.r.n. blood pressure medication only if patient's blood pressure greater than  180/110 and she develops symptoms such as worsening chest pain or shortness of breath.        Malnutrition of moderate degree  Nutrition consult.      Chronic respiratory failure with hypoxia  Patient with Hypoxic Respiratory failure which is Chronic.  she is vent dependent. Supplemental oxygen was provided and noted- Vent Mode: A/CMV-VC  Oxygen Concentration (%):  [35] 35  Resp Rate Total:  [24 br/min-43 br/min] 24 br/min  Vt Set:  [350 mL] 350 mL  PEEP/CPAP:  [4.3 cmH20-8.2 cmH20] 5.3 cmH20.   Signs/symptoms of respiratory failure include- tachypnea and increased work of breathing. Contributing diagnoses includes - Aspiration and Pneumonia Labs and images were reviewed. Patient Has not had a recent ABG. Will treat underlying causes.    PEG (percutaneous endoscopic gastrostomy) status  Noted; care per nursing.  TF per nutrition recs.  Slowly advance PEG tube feeding.        Tracheostomy dependence  Noted; care per nursing      Anoxic brain injury  Noted.  Turn q2h      Anemia of chronic disease  Patient's anemia is currently controlled. S/p 0 units of PRBCs.  Current CBC reviewed-   Lab Results   Component Value Date    HGB 8.2 (L) 01/06/2022    HCT 26.9 (L) 01/06/2022    MCV 92 01/06/2022     01/06/2022     Monitor serial CBC and transfuse if patient becomes hemodynamically unstable, symptomatic or H/H drops below 7/21.           VTE Risk Mitigation (From admission, onward)         Ordered     enoxaparin injection 40 mg  Daily         01/04/22 0034     IP VTE HIGH RISK PATIENT  Once         01/04/22 0034     Place sequential compression device  Until discontinued          01/04/22 0034                Discharge Planning   [place]:  TBD    Code Status: Full Code   Is the patient medically ready for discharge?:     Reason for patient still in hospital (select all that apply): Patient trending condition and Consult recommendations  Discharge Plan A: Return to nursing home            Critical care time spent on the evaluation and treatment of severe organ dysfunction, review of pertinent labs and imaging studies, discussions with consulting providers and discussions with patient/family: 31 minutes.      Geeta Siddiqi MD  Department of Hospital Medicine   [facility]

## 2022-01-12 NOTE — PLAN OF CARE
Intervention: enteral nutrition therapy, collaboration with care providers      Recommendation:  1) Restart TF when medically able - same as TF PTA   Isosource 1.5 @ 20 ml/hr advancing to 50 ml/hr + 110 ml flush q 4 hr + beneprotein BID +Benny BID   ( provides 1800 kcal ( > 100% EEN), 81 g protein + bene ( 94% EPN), 912 ml free water)   2) If unable to restart TF in 2-3 days consider PPN  D 5 AA 4.25 @ 75 ml/hr + standard lipids ( 1112 kcal, 76 g protein)     3) weigh weekly, replete lytes as needed     Goals: 1) TF advanced in < 4 days 2) TF meeting > 50% of needs at f/u  Nutrition Goal Status: was meeting/ not met  Communication of RD Recs: POC, sticky note

## 2022-01-12 NOTE — CONSULTS
Consult Note  Infectious Disease    Reason for Consult:  Acute cholecystitis/ pneumonia    HPI: Genna Felix is a 61 y.o. female resident of [facility], with past medical history of anoxic brain injury status post tracheostomy/peg tube, prior PE, hypertension, GERD sent for tachycardia.  Patient with prior admission to [facility] for pneumonia, discharged 12/27/21 on Bactrim.  Information obtained from medical chart.  Patient is nonverbal at baseline, no family present.  In the ER, tachycardic, trach to vent.   Lab significant for WBC of 14, PMN:  87.5%  UA grossly positive, urine culture grew Proteus mirabilis likely ESBL.  Blood cultures from admission 1/4 bottles grew CoNS, likely a contaminant.  Chest x-ray right lung airspace disease c/w pneumonia.  Respiratory culture from 01/05 with multiple organisms including Serratia marcescens, ESBL Klebsiella pneumoniae, and  Pseudomonas.    Empirically started on Vancomycin and Zosyn, switched to Meropenem 1/9.     Further workup including CT scan abdomen/pelvis revealed findings c/w acute cholecystitis with 2 stones in the cystic duct.  HIDA scan suggestive of cystic duct obstruction.     Plan for IR guided percutaneous drainage on 1/13.     ID consult for acute cholecystitis and antibiotic recommendations.      Review of patient's allergies indicates:  No Known Allergies  Past Medical History:   Diagnosis Date    Anoxic brain damage 07/2020    Bedbound 07/2020    Cardiac arrest 07/2020    Cirrhosis     GERD (gastroesophageal reflux disease)     Hemangioma of intra-abdominal structure     Hypertension     Nursing home resident     Protein calorie malnutrition     Pulmonary embolus     Respiratory distress     S/P percutaneous endoscopic gastrostomy (PEG) tube placement 07/2020    Total self-care deficit 07/2020    Tracheostomy dependence     7/2020     Past Surgical History:   Procedure Laterality Date    PEG W/TRACHEOSTOMY PLACEMENT  07/27/2020     SKIN GRAFT      buttocks     Social History     Tobacco Use    Smoking status: Never Smoker    Smokeless tobacco: Never Used   Substance Use Topics    Alcohol use: Not Currently        Family History   Problem Relation Age of Onset    No Known Problems Mother     No Known Problems Father          Review of Systems:   Unable to obtain, patient is non-verbal at baseline     EXAM & DIAGNOSTICS REVIEWED:   Vitals:     Temp:  [95.9 °F (35.5 °C)-99.1 °F (37.3 °C)]   Temp: 98.42 °F (36.9 °C) (01/12/22 1400)  Pulse: 95 (01/12/22 1400)  Resp: (!) 26 (01/12/22 1530)  BP: 123/73 (01/12/22 1400)  SpO2: 100 % (01/12/22 1400)    Intake/Output Summary (Last 24 hours) at 1/12/2022 1617  Last data filed at 1/12/2022 1400  Gross per 24 hour   Intake 3668.88 ml   Output 1450 ml   Net 2218.88 ml       General:  Chronically ill-appearing, trach to vent FiO2 50%, in NAD. Alert   Eyes:  Hyperemic conjunctiva both eyes, anicteric, PERRL  ENT:  Limited, oral mucosa dry, no thrush  Neck:  Supple, no adenopathy appreciated  Lungs: Rhonchi R, L clear, thick purulent secretions  Heart:  S1/S2+, regular rhythm, no murmurs  Abd:  PEG tube in place, no evidence of redness or purulent drainage from ostomy, +BS, soft, non tender, non distended  :  [place], cloudy urine, no flank tenderness  Musc:  Flexure contractures on hands  Skin:  Warm, no rash, thickening of nails   Wound:   Neuro: Not following commands    Psych:  Calm  Lymphatic:     No cervical, supraclavicular nodes  Extrem: No LE edema b/l  VAD:         Isolation:     1/4/22:       Sacrum  Right hand    Right elbow      General Labs reviewed:  Recent Labs   Lab 01/06/22  0448 01/11/22  0416 01/12/22  0330   WBC 4.91 10.49 5.97   HGB 8.2* 7.6* 7.6*   HCT 26.9* 24.3* 24.3*    297 320       Recent Labs   Lab 01/10/22  0252 01/11/22  0416 01/12/22  0330    132* 134*   K 3.8 3.9 3.6    101 99   CO2 20* 23 26   BUN 7 8 7   CREATININE 0.4* 0.4* 0.4*   CALCIUM 8.2* 8.8  8.7   PROT 5.9* 5.8* 6.1   BILITOT 0.9 0.7 0.5   ALKPHOS 141* 141* 144*   ALT 18 14 12   AST 15 18 10     No results for input(s): CRP in the last 168 hours.  No results for input(s): SEDRATE in the last 168 hours.    Estimated Creatinine Clearance: 138.4 mL/min (A) (based on SCr of 0.4 mg/dL (L)).     Micro:  Microbiology Results (last 7 days)     Procedure Component Value Units Date/Time    Blood culture [[id number]]     Order Status: Sent Specimen: Blood     Blood culture [[id number]]     Order Status: Sent Specimen: Blood     Culture, Respiratory with Gram Stain [[id number]]  (Abnormal)  (Susceptibility) Collected: 01/05/22 2009    Order Status: Completed Specimen: Respiratory from Endotracheal Aspirate Updated: 01/11/22 0637     Respiratory Culture No S aureus isolated.      SERRATIA MARCESCENS  Many        KLEBSIELLA PNEUMONIAE ESBL  Moderate        PSEUDOMONAS AERUGINOSA   Moderate       Gram Stain (Respiratory) <10 epithelial cells per low power field.     Gram Stain (Respiratory) Rare WBC's     Gram Stain (Respiratory) Few Gram negative rods     Gram Stain (Respiratory) Rare yeast    Blood culture x two cultures. Draw prior to antibiotics. [[id number]] Collected: 01/03/22 1950    Order Status: Completed Specimen: Blood Updated: 01/09/22 0612     Blood Culture, Routine No growth after 5 days.    Narrative:      Aerobic and anaerobic    Urine culture [[id number]]  (Abnormal)  (Susceptibility) Collected: 01/03/22 1943    Order Status: Completed Specimen: Urine Updated: 01/07/22 1553     Urine Culture, Routine PROTEUS MIRABILIS  >100,000 cfu/ml      Narrative:      Specimen Source->Urine    Blood culture x two cultures. Draw prior to antibiotics. [[id number]]  (Abnormal) Collected: 01/03/22 1950    Order Status: Completed Specimen: Blood Updated: 01/06/22 1313     Blood Culture, Routine Gram stain peds bottle: Gram positive cocci in clusters resembling Staph      Results called to and read back by:Yvonne  JASBIR Gamez 01/04/2022  23:08      COAGULASE-NEGATIVE STAPHYLOCOCCUS SPECIES  Organism is a probable contaminant      Narrative:      Aerobic and anaerobic          Imaging Reviewed:  CXRs  CT abdomen/pelvis   HIDA scan      IMPRESSION & PLAN     1. Sepsis multifactorial; right pneumonia/UTI/acute cholecystitis   Urine culture grew Proteus mirabilis likely ESBL   Blood cultures from admission 1/4 bottles grew CoNS, likely a contaminant   Respiratory culture 1/5 with multiple organisms; Serratia marcescens, ESBL Klebsiella pneumoniae, and  Pseudomonas, the latter susceptible to Avycaz    2. Anoxic brain injury/bed-bound, non-verbal, debility     Recommendations:  Adequate source control   Plan for IR guided percutaneous cholecystostomy  CRP and procalcitonin   Blood cultures x 2 sets  Dc Merrem   Start Avycaz 2.5g IV q8h, lab called to set up plates for ESBL Klebsiella and Serratia   Artificial tears 3 times a day both eyes  Aspiration precautions  Oral hygiene  Follow cultures  Will follow     I have spent > 35 minutes providing critical care services for this pt for the above diagnoses. These services have included pt evaluation, pt exam, discussions with staff, chart review, data review, note preparation and . The patient has life threatening illness with a high risk of decompensation and/or death.    D/w Dr Siddiqi, nursing       Medical Decision Making during this encounter was  [_] Low Complexity  [_] Moderate Complexity  [xx] High Complexity

## 2022-01-13 LAB
ALBUMIN SERPL BCP-MCNC: 1.9 G/DL (ref 3.5–5.2)
ALP SERPL-CCNC: 135 U/L (ref 55–135)
ALT SERPL W/O P-5'-P-CCNC: 13 U/L (ref 10–44)
ANION GAP SERPL CALC-SCNC: 10 MMOL/L (ref 8–16)
AST SERPL-CCNC: 13 U/L (ref 10–40)
BILIRUB SERPL-MCNC: 0.4 MG/DL (ref 0.1–1)
BUN SERPL-MCNC: 4 MG/DL (ref 6–20)
CALCIUM SERPL-MCNC: 8.5 MG/DL (ref 8.7–10.5)
CHLORIDE SERPL-SCNC: 95 MMOL/L (ref 95–110)
CO2 SERPL-SCNC: 25 MMOL/L (ref 23–29)
CREAT SERPL-MCNC: 0.4 MG/DL (ref 0.5–1.4)
EST. GFR  (AFRICAN AMERICAN): >60 ML/MIN/1.73 M^2
EST. GFR  (NON AFRICAN AMERICAN): >60 ML/MIN/1.73 M^2
GLUCOSE SERPL-MCNC: 89 MG/DL (ref 70–110)
MAGNESIUM SERPL-MCNC: 1.4 MG/DL (ref 1.6–2.6)
PHOSPHATE SERPL-MCNC: 2.5 MG/DL (ref 2.7–4.5)
POCT GLUCOSE: 101 MG/DL (ref 70–110)
POCT GLUCOSE: 106 MG/DL (ref 70–110)
POCT GLUCOSE: 112 MG/DL (ref 70–110)
POCT GLUCOSE: 114 MG/DL (ref 70–110)
POCT GLUCOSE: 121 MG/DL (ref 70–110)
POTASSIUM SERPL-SCNC: 3.9 MMOL/L (ref 3.5–5.1)
PROT SERPL-MCNC: 6.1 G/DL (ref 6–8.4)
SODIUM SERPL-SCNC: 130 MMOL/L (ref 136–145)

## 2022-01-13 PROCEDURE — 87070 CULTURE OTHR SPECIMN AEROBIC: CPT | Performed by: INTERNAL MEDICINE

## 2022-01-13 PROCEDURE — 25000003 PHARM REV CODE 250: Performed by: STUDENT IN AN ORGANIZED HEALTH CARE EDUCATION/TRAINING PROGRAM

## 2022-01-13 PROCEDURE — 63600175 PHARM REV CODE 636 W HCPCS: Performed by: SURGERY

## 2022-01-13 PROCEDURE — 25000003 PHARM REV CODE 250: Performed by: HOSPITALIST

## 2022-01-13 PROCEDURE — 80053 COMPREHEN METABOLIC PANEL: CPT | Performed by: NURSE PRACTITIONER

## 2022-01-13 PROCEDURE — 36415 COLL VENOUS BLD VENIPUNCTURE: CPT | Performed by: NURSE PRACTITIONER

## 2022-01-13 PROCEDURE — 25000003 PHARM REV CODE 250: Performed by: SURGERY

## 2022-01-13 PROCEDURE — 27000221 HC OXYGEN, UP TO 24 HOURS

## 2022-01-13 PROCEDURE — 87076 CULTURE ANAEROBE IDENT EACH: CPT | Performed by: INTERNAL MEDICINE

## 2022-01-13 PROCEDURE — S5010 5% DEXTROSE AND 0.45% SALINE: HCPCS | Performed by: INTERNAL MEDICINE

## 2022-01-13 PROCEDURE — 87077 CULTURE AEROBIC IDENTIFY: CPT | Mod: 59 | Performed by: INTERNAL MEDICINE

## 2022-01-13 PROCEDURE — 87075 CULTR BACTERIA EXCEPT BLOOD: CPT | Performed by: INTERNAL MEDICINE

## 2022-01-13 PROCEDURE — 99233 PR SUBSEQUENT HOSPITAL CARE,LEVL III: ICD-10-PCS | Mod: ,,, | Performed by: INTERNAL MEDICINE

## 2022-01-13 PROCEDURE — 99233 SBSQ HOSP IP/OBS HIGH 50: CPT | Mod: S$GLB,,, | Performed by: STUDENT IN AN ORGANIZED HEALTH CARE EDUCATION/TRAINING PROGRAM

## 2022-01-13 PROCEDURE — 63600175 PHARM REV CODE 636 W HCPCS: Performed by: STUDENT IN AN ORGANIZED HEALTH CARE EDUCATION/TRAINING PROGRAM

## 2022-01-13 PROCEDURE — 27000207 HC ISOLATION

## 2022-01-13 PROCEDURE — 84100 ASSAY OF PHOSPHORUS: CPT | Performed by: NURSE PRACTITIONER

## 2022-01-13 PROCEDURE — 20000000 HC ICU ROOM

## 2022-01-13 PROCEDURE — 99900035 HC TECH TIME PER 15 MIN (STAT)

## 2022-01-13 PROCEDURE — 25000003 PHARM REV CODE 250: Performed by: NURSE PRACTITIONER

## 2022-01-13 PROCEDURE — 99900022

## 2022-01-13 PROCEDURE — 99233 SBSQ HOSP IP/OBS HIGH 50: CPT | Mod: ,,, | Performed by: INTERNAL MEDICINE

## 2022-01-13 PROCEDURE — 63600175 PHARM REV CODE 636 W HCPCS: Performed by: HOSPITALIST

## 2022-01-13 PROCEDURE — 99233 PR SUBSEQUENT HOSPITAL CARE,LEVL III: ICD-10-PCS | Mod: S$GLB,,, | Performed by: STUDENT IN AN ORGANIZED HEALTH CARE EDUCATION/TRAINING PROGRAM

## 2022-01-13 PROCEDURE — 25000242 PHARM REV CODE 250 ALT 637 W/ HCPCS: Performed by: NURSE PRACTITIONER

## 2022-01-13 PROCEDURE — 87186 SC STD MICRODIL/AGAR DIL: CPT | Performed by: INTERNAL MEDICINE

## 2022-01-13 PROCEDURE — 25000003 PHARM REV CODE 250: Performed by: INTERNAL MEDICINE

## 2022-01-13 PROCEDURE — 94640 AIRWAY INHALATION TREATMENT: CPT

## 2022-01-13 PROCEDURE — 99900026 HC AIRWAY MAINTENANCE (STAT)

## 2022-01-13 PROCEDURE — 83735 ASSAY OF MAGNESIUM: CPT | Performed by: NURSE PRACTITIONER

## 2022-01-13 PROCEDURE — 94761 N-INVAS EAR/PLS OXIMETRY MLT: CPT

## 2022-01-13 PROCEDURE — 94003 VENT MGMT INPAT SUBQ DAY: CPT

## 2022-01-13 PROCEDURE — 63600175 PHARM REV CODE 636 W HCPCS: Performed by: NURSE PRACTITIONER

## 2022-01-13 RX ADMIN — FAMOTIDINE 20 MG: 10 INJECTION INTRAVENOUS at 08:01

## 2022-01-13 RX ADMIN — CEFTAZIDIME, AVIBACTAM 2.5 G: 2; .5 POWDER, FOR SOLUTION INTRAVENOUS at 04:01

## 2022-01-13 RX ADMIN — IPRATROPIUM BROMIDE AND ALBUTEROL SULFATE 3 ML: 2.5; .5 SOLUTION RESPIRATORY (INHALATION) at 11:01

## 2022-01-13 RX ADMIN — MIDODRINE HYDROCHLORIDE 10 MG: 5 TABLET ORAL at 08:01

## 2022-01-13 RX ADMIN — DEXTROSE AND SODIUM CHLORIDE: 5; .45 INJECTION, SOLUTION INTRAVENOUS at 06:01

## 2022-01-13 RX ADMIN — IPRATROPIUM BROMIDE AND ALBUTEROL SULFATE 3 ML: 2.5; .5 SOLUTION RESPIRATORY (INHALATION) at 07:01

## 2022-01-13 RX ADMIN — MORPHINE SULFATE 2 MG: 2 INJECTION, SOLUTION INTRAMUSCULAR; INTRAVENOUS at 08:01

## 2022-01-13 RX ADMIN — ENOXAPARIN SODIUM 40 MG: 40 INJECTION SUBCUTANEOUS at 04:01

## 2022-01-13 RX ADMIN — HYPROMELLOSE 2910 1 DROP: 5 SOLUTION OPHTHALMIC at 09:01

## 2022-01-13 RX ADMIN — POTASSIUM & SODIUM PHOSPHATES POWDER PACK 280-160-250 MG 2 PACKET: 280-160-250 PACK at 02:01

## 2022-01-13 RX ADMIN — MIDODRINE HYDROCHLORIDE 10 MG: 5 TABLET ORAL at 04:01

## 2022-01-13 RX ADMIN — CHLORHEXIDINE GLUCONATE 0.12% ORAL RINSE 15 ML: 1.2 LIQUID ORAL at 09:01

## 2022-01-13 RX ADMIN — IPRATROPIUM BROMIDE AND ALBUTEROL SULFATE 3 ML: 2.5; .5 SOLUTION RESPIRATORY (INHALATION) at 04:01

## 2022-01-13 RX ADMIN — IPRATROPIUM BROMIDE AND ALBUTEROL SULFATE 3 ML: 2.5; .5 SOLUTION RESPIRATORY (INHALATION) at 12:01

## 2022-01-13 RX ADMIN — MAGNESIUM SULFATE 2 G: 2 INJECTION INTRAVENOUS at 07:01

## 2022-01-13 RX ADMIN — HYPROMELLOSE 2910 1 DROP: 5 SOLUTION OPHTHALMIC at 04:01

## 2022-01-13 RX ADMIN — CEFTAZIDIME, AVIBACTAM 2.5 G: 2; .5 POWDER, FOR SOLUTION INTRAVENOUS at 08:01

## 2022-01-13 RX ADMIN — POTASSIUM & SODIUM PHOSPHATES POWDER PACK 280-160-250 MG 2 PACKET: 280-160-250 PACK at 06:01

## 2022-01-13 RX ADMIN — HYPROMELLOSE 2910 1 DROP: 5 SOLUTION OPHTHALMIC at 03:01

## 2022-01-13 RX ADMIN — MAGNESIUM SULFATE 2 G: 2 INJECTION INTRAVENOUS at 12:01

## 2022-01-13 RX ADMIN — CHLORHEXIDINE GLUCONATE 0.12% ORAL RINSE 15 ML: 1.2 LIQUID ORAL at 08:01

## 2022-01-13 RX ADMIN — CEFTAZIDIME, AVIBACTAM 2.5 G: 2; .5 POWDER, FOR SOLUTION INTRAVENOUS at 12:01

## 2022-01-13 RX ADMIN — HYPROMELLOSE 2910 1 DROP: 5 SOLUTION OPHTHALMIC at 08:01

## 2022-01-13 NOTE — NURSING
Patient arrived to IR from room 510 on transport monitor and portable vent for ir cholecystotomy tube placement with local anesthetic. VS monitored and remained stable for duration of procedure. Patient transported back to room 510 in stable condition.Report given to velvet

## 2022-01-13 NOTE — PROGRESS NOTES
[facility] Medicine  Progress Note    Patient Name: Genna Felix  MRN: [id number]  Patient Class: IP- Inpatient   Admission Date: 1/3/2022  Length of Stay: 10 days  Attending Physician: Geeta Siddiqi MD  Primary Care Provider: Primary Doctor No        Subjective:     Principal Problem:Sepsis        HPI:  Genna Felix is a 61 year old female with a past medical history of anoxic brain injury s/p G tube and tracheostomy, PE, HTN, GERD who presented from [facility] for further evaluation of tachycardia.  She was discharged from this facility on 12/27/21 with a diagnosis of pneumonia with rx for bactrim.  In the ED she is found to be tachycardic with WBC 14K, lactic acid 2.6 and evidence of UTI.  CXR reveals worsening multifocal pneumonia.  Pt is nonverbal and cannot contribute to history of present illness, therefore further information is limited.  While in the ED, IV zosyn was initiated and she was give an IVF bolus.  She will be admitted to the service of hospital medicine for continued monitoring and treatment.      Overview/Hospital Course:  61-year-old female with past medical history of anoxic brain injury, PE, hypertension, GERD admitted to the hospital medicine service for UTI and worsening multifocal pneumonia.  She was placed on broad-spectrum antibiotics.  Pulmonologist was consulted.  Urine culture grew presumptive Proteus.  Sputum culture currently with few Gram-negative rods.      Interval History:  Status post percutaneous cholecystostomy drain placed today.  Sputum culture grew Pseudomonas, Klebsiella pneumoniae (ESBL species) and Serratia species. Blood culture with CoNS- contaminant.  Infectious Disease started intravenous Avycaz. Urine with Proteus species.  Peg tube feeding at goal.  Significant gastric residual noted.  Pulmonary secretions suctioning gastric contents.  Tube feed is being held.  CT abdomen suggested acute cholecystitis.  Interventional Radiology is being  consulted for percutaneous cholecystostomy drain placement.    Review of Systems   Unable to perform ROS: Patient nonverbal     Objective:     Vital Signs (Most Recent):  Temp: 97.7 °F (36.5 °C) (01/13/22 0615)  Pulse: 80 (80) (01/13/22 0709)  Resp: (!) 25 (01/13/22 0709)  BP: (!) 115/59 (01/13/22 0400)  SpO2: 100 % (01/13/22 0709) Vital Signs (24h Range):  Temp:  [97.2 °F (36.2 °C)-98.78 °F (37.1 °C)] 97.7 °F (36.5 °C)  Pulse:  [71-95] 80  Resp:  [18-35] 25  SpO2:  [100 %] 100 %  BP: (106-141)/(59-76) 115/59     Weight: 74.4 kg (164 lb 0.4 oz)  Body mass index is 32.03 kg/m².    Intake/Output Summary (Last 24 hours) at 1/13/2022 0942  Last data filed at 1/13/2022 0639  Gross per 24 hour   Intake 4221.22 ml   Output 4200 ml   Net 21.22 ml      Physical Exam  Vitals and nursing note reviewed.   Constitutional:       General: She is not in acute distress.     Appearance: She is well-developed. She is ill-appearing.      Comments: Chronic trach/vent dependent   HENT:      Head: Normocephalic and atraumatic.      Mouth/Throat:      Mouth: Mucous membranes are dry.   Cardiovascular:      Rate and Rhythm: Normal rate and regular rhythm.   Pulmonary:      Effort: Pulmonary effort is normal. No respiratory distress.      Breath sounds: Rhonchi present.   Abdominal:      General: Bowel sounds are normal.      Palpations: Abdomen is soft.      Tenderness: There is no abdominal tenderness.      Comments: g tube in place   Musculoskeletal:      Cervical back: Normal range of motion and neck supple.      Comments: Contractures to hands   Skin:     General: Skin is warm and dry.   Neurological:      GCS: GCS eye subscore is 1. GCS verbal subscore is 1. GCS motor subscore is 1.   Psychiatric:      Comments: Unable to assess         Significant Labs:  Lab Results   Component Value Date    WBC 5.97 01/12/2022    HGB 7.6 (L) 01/12/2022    HCT 24.3 (L) 01/12/2022    MCV 88 01/12/2022     01/12/2022       CMP  Sodium   Date  Value Ref Range Status   01/13/2022 130 (L) 136 - 145 mmol/L Final     Potassium   Date Value Ref Range Status   01/13/2022 3.9 3.5 - 5.1 mmol/L Final     Chloride   Date Value Ref Range Status   01/13/2022 95 95 - 110 mmol/L Final     CO2   Date Value Ref Range Status   01/13/2022 25 23 - 29 mmol/L Final     Glucose   Date Value Ref Range Status   01/13/2022 89 70 - 110 mg/dL Final     BUN   Date Value Ref Range Status   01/13/2022 4 (L) 6 - 20 mg/dL Final     Creatinine   Date Value Ref Range Status   01/13/2022 0.4 (L) 0.5 - 1.4 mg/dL Final     Calcium   Date Value Ref Range Status   01/13/2022 8.5 (L) 8.7 - 10.5 mg/dL Final     Total Protein   Date Value Ref Range Status   01/13/2022 6.1 6.0 - 8.4 g/dL Final     Albumin   Date Value Ref Range Status   01/13/2022 1.9 (L) 3.5 - 5.2 g/dL Final     Total Bilirubin   Date Value Ref Range Status   01/13/2022 0.4 0.1 - 1.0 mg/dL Final     Comment:     For infants and newborns, interpretation of results should be based  on gestational age, weight and in agreement with clinical  observations.    Premature Infant recommended reference ranges:  Up to 24 hours.............<8.0 mg/dL  Up to 48 hours............<12.0 mg/dL  3-5 days..................<15.0 mg/dL  6-29 days.................<15.0 mg/dL       Alkaline Phosphatase   Date Value Ref Range Status   01/13/2022 135 55 - 135 U/L Final     AST   Date Value Ref Range Status   01/13/2022 13 10 - 40 U/L Final     ALT   Date Value Ref Range Status   01/13/2022 13 10 - 44 U/L Final     Anion Gap   Date Value Ref Range Status   01/13/2022 10 8 - 16 mmol/L Final     eGFR if    Date Value Ref Range Status   01/13/2022 >60 >60 mL/min/1.73 m^2 Final     eGFR if non    Date Value Ref Range Status   01/13/2022 >60 >60 mL/min/1.73 m^2 Final     Comment:     Calculation used to obtain the estimated glomerular filtration  rate (eGFR) is the CKD-EPI equation.        Microbiology Results (last 7 days)      Procedure Component Value Units Date/Time    Blood culture [[id number]] Collected: 01/12/22 1627    Order Status: Completed Specimen: Blood from Peripheral, Right Hand Updated: 01/13/22 0515     Blood Culture, Routine No Growth to date    Blood culture [[id number]] Collected: 01/12/22 1627    Order Status: Completed Specimen: Blood from Peripheral, Left Hand Updated: 01/13/22 0515     Blood Culture, Routine No Growth to date    Culture, Respiratory with Gram Stain [[id number]]  (Abnormal)  (Susceptibility) Collected: 01/05/22 2009    Order Status: Completed Specimen: Respiratory from Endotracheal Aspirate Updated: 01/11/22 0637     Respiratory Culture No S aureus isolated.      SERRATIA MARCESCENS  Many        KLEBSIELLA PNEUMONIAE ESBL  Moderate        PSEUDOMONAS AERUGINOSA   Moderate       Gram Stain (Respiratory) <10 epithelial cells per low power field.     Gram Stain (Respiratory) Rare WBC's     Gram Stain (Respiratory) Few Gram negative rods     Gram Stain (Respiratory) Rare yeast    Blood culture x two cultures. Draw prior to antibiotics. [[id number]] Collected: 01/03/22 1950    Order Status: Completed Specimen: Blood Updated: 01/09/22 0612     Blood Culture, Routine No growth after 5 days.    Narrative:      Aerobic and anaerobic    Urine culture [[id number]]  (Abnormal)  (Susceptibility) Collected: 01/03/22 1943    Order Status: Completed Specimen: Urine Updated: 01/07/22 1553     Urine Culture, Routine PROTEUS MIRABILIS  >100,000 cfu/ml      Narrative:      Specimen Source->Urine    Blood culture x two cultures. Draw prior to antibiotics. [[id number]]  (Abnormal) Collected: 01/03/22 1950    Order Status: Completed Specimen: Blood Updated: 01/06/22 1313     Blood Culture, Routine Gram stain peds bottle: Gram positive cocci in clusters resembling Staph      Results called to and read back by:Yvonne Gamez RN 01/04/2022  23:08      COAGULASE-NEGATIVE STAPHYLOCOCCUS SPECIES  Organism is a probable contaminant    "   Narrative:      Aerobic and anaerobic          Significant Imaging:  CXR:  Worsening airspace opacities in bilateral lungs, suspicious for multifocal pneumonia.    CXR: Resolving intrapulmonary infiltrates with a small amount of consolidation remaining at the right lung base. Tracheostomy tube in position.    KUB: No acute abdominal process.    CXR: Resolving intrapulmonary infiltrates with a small amount of consolidation remaining at the right lung base. Tracheostomy tube in position.    CXR: Right lung infiltrate without significant change.    KUB:  No acute abdominal process.    CT abdomen and pelvis with contrast:  Features of acute cholecystitis.  Two stones appear present within the cystic duct.  No common duct stones identified. Small bilateral pleural effusions and patchy bibasilar airspace disease compatible with pneumonia.      Assessment/Plan:      * Sepsis  This patient does have evidence of infective focus  My overall impression is sepsis. Vital signs were reviewed and noted in progress note.  Antibiotics given-   Antibiotics (From admission, onward)            Start     Stop Route Frequency Ordered    01/06/22 0630  vancomycin 1.25 g in dextrose 5% 250 mL IVPB (ready to mix)         -- IV Every 18 hours 01/06/22 0611    01/04/22 0900  mupirocin 2 % ointment         01/09 0859 Nasl 2 times daily 01/04/22 0142    01/04/22 0400  piperacillin-tazobactam 4.5 g in dextrose 5 % 100 mL IVPB (ready to mix system)         -- IV Every 8 hours (non-standard times) 01/03/22 2141    01/03/22 2241  vancomycin - pharmacy to dose  (vancomycin IVPB)        "And" Linked Group Details    -- IV pharmacy to manage frequency 01/03/22 2141        Cultures were taken-   Microbiology Results (last 7 days)     Procedure Component Value Units Date/Time    Culture, Respiratory with Gram Stain [[id number]]  (Abnormal) Collected: 01/05/22 2009    Order Status: Completed Specimen: Respiratory from Endotracheal Aspirate Updated: " 01/07/22 1035     Respiratory Culture GRAM NEGATIVE CHRISTINA  Many  Identification and susceptibility pending       Gram Stain (Respiratory) <10 epithelial cells per low power field.     Gram Stain (Respiratory) Rare WBC's     Gram Stain (Respiratory) Few Gram negative rods     Gram Stain (Respiratory) Rare yeast    Blood culture x two cultures. Draw prior to antibiotics. [[id number]] Collected: 01/03/22 1950    Order Status: Completed Specimen: Blood Updated: 01/07/22 0612     Blood Culture, Routine No Growth to date      No Growth to date      No Growth to date      No Growth to date    Narrative:      Aerobic and anaerobic    Urine culture [[id number]]  (Abnormal)  (Susceptibility) Collected: 01/03/22 1943    Order Status: Completed Specimen: Urine Updated: 01/06/22 1535     Urine Culture, Routine PROTEUS MIRABILIS  >100,000 cfu/ml  Identification and susceptibility pending      Narrative:      Specimen Source->Urine    Blood culture x two cultures. Draw prior to antibiotics. [[id number]]  (Abnormal) Collected: 01/03/22 1950    Order Status: Completed Specimen: Blood Updated: 01/06/22 1313     Blood Culture, Routine Gram stain peds bottle: Gram positive cocci in clusters resembling Staph      Results called to and read back by:Yvonne Gamez RN 01/04/2022  23:08      COAGULASE-NEGATIVE STAPHYLOCOCCUS SPECIES  Organism is a probable contaminant      Narrative:      Aerobic and anaerobic        Latest lactate reviewed, they are-  No results for input(s): LACTATE in the last 72 hours.    Organ dysfunction indicated by tachycardia, tachypnea  Source- respiratory, urine      Source control Achieved by- IV abx      Acute cholecystitis  Status post percutaneous cholecystostomy drain placement on January 13, 2022. Follow General surgery recommendations.  Continue intravenous antibiotic therapy as per ID specialist.        Hyponatremia  Follow BMP.       UTI (urinary tract infection)  IV meropenem; follow culture        Pressure  injury of left buttock, stage 4  Consult wound care  Turn q2h        Ventilator associated pneumonia  Failed OP abx  Pt vent dependent-consult pulmonology for vent management  On intravenous meropenem and vancomycin.  Blood and sputum cx  Nebulizer treatments        Gastroesophageal reflux disease  Chronic; utilize pepcid acutely.      Essential hypertension  Chronic, stable.  Latest blood pressure and vitals reviewed-   Temp:  [96.08 °F (35.6 °C)-98.24 °F (36.8 °C)]   Pulse:  []   Resp:  [10-75]   BP: (113-160)/(62-98)   SpO2:  [94 %-99 %] .   Home meds for hypertension were reviewed and noted below.   No chronic antihypertensives noted. Review of home meds reveals midodrine which has been reordered.      Will utilize p.r.n. blood pressure medication only if patient's blood pressure greater than  180/110 and she develops symptoms such as worsening chest pain or shortness of breath.        Malnutrition of moderate degree  Nutrition consult.      Chronic respiratory failure with hypoxia  Patient with Hypoxic Respiratory failure which is Chronic.  she is vent dependent. Supplemental oxygen was provided and noted- Vent Mode: A/CMV-VC  Oxygen Concentration (%):  [35] 35  Resp Rate Total:  [24 br/min-43 br/min] 24 br/min  Vt Set:  [350 mL] 350 mL  PEEP/CPAP:  [4.3 cmH20-8.2 cmH20] 5.3 cmH20.   Signs/symptoms of respiratory failure include- tachypnea and increased work of breathing. Contributing diagnoses includes - Aspiration and Pneumonia Labs and images were reviewed. Patient Has not had a recent ABG. Will treat underlying causes.    PEG (percutaneous endoscopic gastrostomy) status  Noted; care per nursing.  TF per nutrition recs.  Slowly advance PEG tube feeding.        Tracheostomy dependence  Noted; care per nursing      Anoxic brain injury  Noted.  Turn q2h      Anemia of chronic disease  Patient's anemia is currently controlled. S/p 0 units of PRBCs.  Current CBC reviewed-   Lab Results   Component Value  Date    HGB 8.2 (L) 01/06/2022    HCT 26.9 (L) 01/06/2022    MCV 92 01/06/2022     01/06/2022     Monitor serial CBC and transfuse if patient becomes hemodynamically unstable, symptomatic or H/H drops below 7/21.           VTE Risk Mitigation (From admission, onward)         Ordered     enoxaparin injection 40 mg  Daily         01/04/22 0034     IP VTE HIGH RISK PATIENT  Once         01/04/22 0034     Place sequential compression device  Until discontinued         01/04/22 0034                Discharge Planning   [place]: TBD      Code Status: Full Code   Is the patient medically ready for discharge?:     Reason for patient still in hospital (select all that apply): Patient trending condition  Discharge Plan A: Return to nursing home            Critical care time spent on the evaluation and treatment of severe organ dysfunction, review of pertinent labs and imaging studies, discussions with consulting providers and discussions with patient/family: 31 minutes.      Geeta Siddiqi MD  Department of Hospital Medicine   [facility]

## 2022-01-13 NOTE — PLAN OF CARE
Awake, alert, non verbal, looking around, trach, vent, diminished lung sounds, thick sputum, ekg shows sr, to ir to have a cath placed in the gallbladder, tube feeding restarted, carroll in use, hl x 3, had bm

## 2022-01-13 NOTE — ASSESSMENT & PLAN NOTE
Status post percutaneous cholecystostomy drain placement on January 13, 2022. Follow General surgery recommendations.  Continue intravenous antibiotic therapy as per ID specialist.

## 2022-01-13 NOTE — PLAN OF CARE
Pt continues on current POC. NPO p MN for Louisa drain placement. Pt afebrile with good UOP. No adverse events overnight. Safety measures active, will continue to monitor.    Problem: Communication Impairment (Mechanical Ventilation, Invasive)  Goal: Effective Communication  Outcome: Ongoing, Progressing     Problem: Device-Related Complication Risk (Mechanical Ventilation, Invasive)  Goal: Optimal Device Function  Outcome: Ongoing, Progressing     Problem: Inability to Wean (Mechanical Ventilation, Invasive)  Goal: Mechanical Ventilation Liberation  Outcome: Ongoing, Progressing     Problem: Nutrition Impairment (Mechanical Ventilation, Invasive)  Goal: Optimal Nutrition Delivery  Outcome: Ongoing, Progressing     Problem: Skin and Tissue Injury (Mechanical Ventilation, Invasive)  Goal: Absence of Device-Related Skin and Tissue Injury  Outcome: Ongoing, Progressing     Problem: Ventilator-Induced Lung Injury (Mechanical Ventilation, Invasive)  Goal: Absence of Ventilator-Induced Lung Injury  Outcome: Ongoing, Progressing     Problem: Communication Impairment (Artificial Airway)  Goal: Effective Communication  Outcome: Ongoing, Progressing     Problem: Device-Related Complication Risk (Artificial Airway)  Goal: Optimal Device Function  Outcome: Ongoing, Progressing     Problem: Skin and Tissue Injury (Artificial Airway)  Goal: Absence of Device-Related Skin or Tissue Injury  Outcome: Ongoing, Progressing     Problem: Noninvasive Ventilation Acute  Goal: Effective Unassisted Ventilation and Oxygenation  Outcome: Ongoing, Progressing     Problem: Adult Inpatient Plan of Care  Goal: Plan of Care Review  Outcome: Ongoing, Progressing  Goal: Patient-Specific Goal (Individualized)  Outcome: Ongoing, Progressing  Goal: Absence of Hospital-Acquired Illness or Injury  Outcome: Ongoing, Progressing  Goal: Optimal Comfort and Wellbeing  Outcome: Ongoing, Progressing  Goal: Readiness for Transition of Care  Outcome: Ongoing,  Progressing     Problem: Adjustment to Illness (Delirium)  Goal: Optimal Coping  Outcome: Ongoing, Progressing     Problem: Altered Behavior (Delirium)  Goal: Improved Behavioral Control  Outcome: Ongoing, Progressing     Problem: Attention and Thought Clarity Impairment (Delirium)  Goal: Improved Attention and Thought Clarity  Outcome: Ongoing, Progressing     Problem: Sleep Disturbance (Delirium)  Goal: Improved Sleep  Outcome: Ongoing, Progressing     Problem: Adjustment to Illness (Sepsis/Septic Shock)  Goal: Optimal Coping  Outcome: Ongoing, Progressing     Problem: Bleeding (Sepsis/Septic Shock)  Goal: Absence of Bleeding  Outcome: Ongoing, Progressing     Problem: Glycemic Control Impaired (Sepsis/Septic Shock)  Goal: Blood Glucose Level Within Desired Range  Outcome: Ongoing, Progressing     Problem: Infection Progression (Sepsis/Septic Shock)  Goal: Absence of Infection Signs and Symptoms  Outcome: Ongoing, Progressing     Problem: Nutrition Impaired (Sepsis/Septic Shock)  Goal: Optimal Nutrition Intake  Outcome: Ongoing, Progressing     Problem: Impaired Wound Healing  Goal: Optimal Wound Healing  Outcome: Ongoing, Progressing     Problem: Fall Injury Risk  Goal: Absence of Fall and Fall-Related Injury  Outcome: Ongoing, Progressing     Problem: Skin Injury Risk Increased  Goal: Skin Health and Integrity  Outcome: Ongoing, Progressing     Problem: Infection  Goal: Absence of Infection Signs and Symptoms  Outcome: Ongoing, Progressing     Problem: Aspiration (Enteral Nutrition)  Goal: Absence of Aspiration Signs and Symptoms  Outcome: Ongoing, Progressing     Problem: Device-Related Complication Risk (Enteral Nutrition)  Goal: Safe, Effective Therapy Delivery  Outcome: Ongoing, Progressing     Problem: Feeding Intolerance (Enteral Nutrition)  Goal: Feeding Tolerance  Outcome: Ongoing, Progressing

## 2022-01-13 NOTE — PROGRESS NOTES
Consult Note  Infectious Disease    Reason for Consult:  Acute cholecystitis/ pneumonia    HPI: Genna Felix is a 61 y.o. female resident of [facility], with past medical history of anoxic brain injury status post tracheostomy/peg tube, prior PE, hypertension, GERD sent for tachycardia.  Patient with prior admission to [facility] for pneumonia, discharged 12/27/21 on Bactrim.  Information obtained from medical chart.  Patient is nonverbal at baseline, no family present.  In the ER, tachycardic, trach to vent.   Lab significant for WBC of 14, PMN:  87.5%  UA grossly positive, urine culture grew Proteus mirabilis likely ESBL.  Blood cultures from admission 1/4 bottles grew CoNS, likely a contaminant.  Chest x-ray right lung airspace disease c/w pneumonia.  Respiratory culture from 01/05 with multiple organisms including Serratia marcescens, ESBL Klebsiella pneumoniae, and  Pseudomonas.    Empirically started on Vancomycin and Zosyn, switched to Meropenem 1/9.     Further workup including CT scan abdomen/pelvis revealed findings c/w acute cholecystitis with 2 stones in the cystic duct.  HIDA scan suggestive of cystic duct obstruction.     Plan for IR guided percutaneous drainage on 1/13.     ID consult for acute cholecystitis and antibiotic recommendations.     INTERVAL HISTORY:   1/13:  Interim reviewed, patient status post IR guided per ирина.  Large amount of purulent bilious fluid drained, drain in place, fluid sent for cultures, in process.  Tachypneic, hypertensive, afebrile. Tracheal secretions remain thick and purulent. Repeat blood cultures 1/12 x 2 sets no growth. .     Review of patient's allergies indicates:  No Known Allergies  Past Medical History:   Diagnosis Date    Anoxic brain damage 07/2020    Bedbound 07/2020    Cardiac arrest 07/2020    Cirrhosis     GERD (gastroesophageal reflux disease)     Hemangioma of intra-abdominal structure     Hypertension     Nursing home resident      Protein calorie malnutrition     Pulmonary embolus     Respiratory distress     S/P percutaneous endoscopic gastrostomy (PEG) tube placement 07/2020    Total self-care deficit 07/2020    Tracheostomy dependence     7/2020     Past Surgical History:   Procedure Laterality Date    PEG W/TRACHEOSTOMY PLACEMENT  07/27/2020    SKIN GRAFT      buttocks     Social History     Tobacco Use    Smoking status: Never Smoker    Smokeless tobacco: Never Used   Substance Use Topics    Alcohol use: Not Currently        Family History   Problem Relation Age of Onset    No Known Problems Mother     No Known Problems Father          Review of Systems:   Unable to obtain, patient is non-verbal at baseline     EXAM & DIAGNOSTICS REVIEWED:   Vitals:     Temp:  [97.2 °F (36.2 °C)-98.6 °F (37 °C)]   Temp: 97.7 °F (36.5 °C) (01/13/22 0615)  Pulse: 82 (01/13/22 1146)  Resp: (!) 24 (01/13/22 1146)  BP: (!) 161/94 (01/13/22 1045)  SpO2: 98 % (01/13/22 1146)    Intake/Output Summary (Last 24 hours) at 1/13/2022 1556  Last data filed at 1/13/2022 0639  Gross per 24 hour   Intake 1682.22 ml   Output 4200 ml   Net -2517.78 ml       General:  Chronically ill-appearing, trach to vent FiO2 50%, in NAD. Alert, moving head, not following commands    Eyes:  Hyperemic conjunctiva both eyes, improving anicteric, PERRL  ENT:  Limited, oral mucosa dry, no thrush  Neck:  Supple  Lungs: Rhonchi R, L clear, thick purulent secretions  Heart:  S1/S2+, regular rhythm, no murmurs  Abd:  PEG tube in place, no evidence of redness or purulent drainage from ostomy, +BS, soft, non tender, non distended  :  Hamilton, cloudy urine, no flank tenderness  Musc:  Flexure contractures on hands  Skin:  Warm, no rash, thickening of nails   Wound:   Neuro: Not following commands  Psych:  Calm  Lymphatic:     No cervical, supraclavicular nodes  Extrem: No LE edema b/l  VAD:         Isolation:     1/4/22:       Sacrum  Right hand    Right elbow      General Labs  reviewed:  Recent Labs   Lab 01/11/22  0416 01/12/22  0330   WBC 10.49 5.97   HGB 7.6* 7.6*   HCT 24.3* 24.3*    320       Recent Labs   Lab 01/11/22  0416 01/11/22  0416 01/12/22  0330 01/12/22  1627 01/13/22  0334   *  --  134*  --  130*   K 3.9   < > 3.6 3.9 3.9     --  99  --  95   CO2 23  --  26  --  25   BUN 8  --  7  --  4*   CREATININE 0.4*  --  0.4*  --  0.4*   CALCIUM 8.8  --  8.7  --  8.5*   PROT 5.8*  --  6.1  --  6.1   BILITOT 0.7  --  0.5  --  0.4   ALKPHOS 141*  --  144*  --  135   ALT 14  --  12  --  13   AST 18  --  10  --  13    < > = values in this interval not displayed.     Recent Labs   Lab 01/12/22  1627   .8*     No results for input(s): SEDRATE in the last 168 hours.    Estimated Creatinine Clearance: 136.5 mL/min (A) (based on SCr of 0.4 mg/dL (L)).     Micro:  Microbiology Results (last 7 days)     Procedure Component Value Units Date/Time    Aerobic culture [[id number]] Collected: 01/13/22 1120    Order Status: Sent Specimen: Body Fluid from Gallbladder Updated: 01/13/22 1139    Culture, Anaerobe [[id number]] Collected: 01/13/22 1120    Order Status: Sent Specimen: Body Fluid from Gallbladder Updated: 01/13/22 1139    Culture, Respiratory with Gram Stain [[id number]]  (Abnormal)  (Susceptibility) Collected: 01/05/22 2009    Order Status: Completed Specimen: Respiratory from Endotracheal Aspirate Updated: 01/13/22 1114     Respiratory Culture No S aureus isolated.      SERRATIA MARCESCENS  Many        KLEBSIELLA PNEUMONIAE ESBL  Moderate        PSEUDOMONAS AERUGINOSA   Moderate       Gram Stain (Respiratory) <10 epithelial cells per low power field.     Gram Stain (Respiratory) Rare WBC's     Gram Stain (Respiratory) Few Gram negative rods     Gram Stain (Respiratory) Rare yeast    Blood culture [[id number]] Collected: 01/12/22 1627    Order Status: Completed Specimen: Blood from Peripheral, Right Hand Updated: 01/13/22 0515     Blood Culture, Routine No Growth to  date    Blood culture [[id number]] Collected: 01/12/22 1627    Order Status: Completed Specimen: Blood from Peripheral, Left Hand Updated: 01/13/22 0515     Blood Culture, Routine No Growth to date    Blood culture x two cultures. Draw prior to antibiotics. [[id number]] Collected: 01/03/22 1950    Order Status: Completed Specimen: Blood Updated: 01/09/22 0612     Blood Culture, Routine No growth after 5 days.    Narrative:      Aerobic and anaerobic    Urine culture [[id number]]  (Abnormal)  (Susceptibility) Collected: 01/03/22 1943    Order Status: Completed Specimen: Urine Updated: 01/07/22 1553     Urine Culture, Routine PROTEUS MIRABILIS  >100,000 cfu/ml      Narrative:      Specimen Source->Urine          Imaging Reviewed:  CXRs  CT abdomen/pelvis   HIDA scan      IMPRESSION & PLAN     1. Sepsis multifactorial; right pneumonia/UTI/acute cholecystitis s/p IR guided perc ирниа 1/13   Bilious fluid culture in process   Urine culture grew Proteus mirabilis likely ESBL   Blood cultures from admission 1/4 bottles grew CoNS, likely a contaminant   Respiratory culture 1/5 with multiple organisms; Serratia marcescens, ESBL Klebsiella pneumoniae, and  Pseudomonas, the latter susceptible to Avycaz   Repeat blood cultures x 2 sets no growth pending final    Procalcitonin 0.57     2. Anoxic brain injury/bed-bound, non-verbal, debility     Recommendations:  Continue Avycaz 2.5g IV q8h, lab called to set up plates for ESBL Klebsiella and Serratia, pending  Trend CRP and procalcitonin   Artificial tears 3 times a day both eyes  Aspiration precautions  Oral hygiene  Follow cultures  Will follow     D/w Dr Siddiqi, nursing       Medical Decision Making during this encounter was  [_] Low Complexity  [_] Moderate Complexity  [xx] High Complexity

## 2022-01-13 NOTE — PROGRESS NOTES
Progress Note  PULMONARY    Admit Date: 1/3/2022   01/13/2022      SUBJECTIVE:     1/5- no new issues  1/6- no new issues  1/7- remains on vent, GNR on sputum culture, CXR better. Per RN multiple episodes emesis so tube feeds are held  1/8- no new issues  1/9- no new issues  1/10- no new issues  1/11- yesterday had emesis, aspiration event. abg looks ok. Remains on vent  1/12- no new issues. Per nurse there is plan for cholecystectomy?  1/13- remains on vent, afebrile, vs stable. S/p IR PCT this am      OBJECTIVE:     Vitals (Most recent):  Vitals:    01/13/22 0709   BP:    Pulse: 80   Resp: (!) 25   Temp:        Vitals (24 hour range):  Temp:  [97.2 °F (36.2 °C)-98.78 °F (37.1 °C)]   Pulse:  [54-98]   Resp:  [18-35]   BP: (106-141)/(59-76)   SpO2:  [98 %-100 %]       Intake/Output Summary (Last 24 hours) at 1/13/2022 0844  Last data filed at 1/13/2022 0639  Gross per 24 hour   Intake 4221.22 ml   Output 4200 ml   Net 21.22 ml          Physical Exam:  The patient's neuro status (alertness,orientation,cognitive function,motor skills,), pharyngeal exam (oral lesions, hygiene, abn dentition,), Neck (jvd,mass,thyroid,nodes in neck and above/below clavicle),RESPIRATORY(symmetry,effort,fremitus,percussion,auscultation),  Cor(rhythm,heart tones including gallops,perfusion,edema)ABD(distention,hepatic&splenomegaly,tenderness,masses), Skin(rash,cyanosis),Psyc(affect,judgement,).  Exam negative except for these pertinent findings:    Unresponsive, eyes open and deviate to right, grimaces  Trach w vent  Bilateral coarse breath sounds  Abdomen soft, distended, hypoactive bowel sounds, PEG in place  Percutaneous ángel drain in place, thick yellow material in bag  Contracted upper ext  L hand dressed        Radiographs reviewed: view by direct vision   HIDA scan 1/11-   1. Findings are suggestive of cystic duct obstruction.  2. Common bile duct is patent.  CXR 1/9- worsened appearance R mid/lower lung infiltrates  CXR 1/7-  improved infiltrates  CXR 1/3/22- bilateral lower lobe infiltrates, worsened compared to previous film    Labs     No results for input(s): WBC, HGB, HCT, PLT, BAND, METAMYELOCYT, MYELOPCT, HGBA1C in the last 24 hours.  Recent Labs   Lab 01/12/22  1627 01/12/22  1627 01/12/22  1726 01/13/22  0334   NA  --   --   --  130*   K 3.9   < >  --  3.9   CL  --   --   --  95   CO2  --   --   --  25   BUN  --   --   --  4*   CREATININE  --   --   --  0.4*   GLU  --   --   --  89   CALCIUM  --   --   --  8.5*   MG 1.6   < >  --  1.4*   PHOS  --   --   --  2.5*   AST  --   --   --  13   ALT  --   --   --  13   ALKPHOS  --   --   --  135   BILITOT  --   --   --  0.4   PROT  --   --   --  6.1   ALBUMIN  --   --   --  1.9*   .8*  --   --   --    PROCAL  --   --  0.57*  --     < > = values in this interval not displayed.     No results for input(s): PH, PCO2, PO2, HCO3 in the last 24 hours.  Microbiology Results (last 7 days)     Procedure Component Value Units Date/Time    Blood culture [[id number]] Collected: 01/12/22 1627    Order Status: Completed Specimen: Blood from Peripheral, Right Hand Updated: 01/13/22 0515     Blood Culture, Routine No Growth to date    Blood culture [[id number]] Collected: 01/12/22 1627    Order Status: Completed Specimen: Blood from Peripheral, Left Hand Updated: 01/13/22 0515     Blood Culture, Routine No Growth to date    Culture, Respiratory with Gram Stain [[id number]]  (Abnormal)  (Susceptibility) Collected: 01/05/22 2009    Order Status: Completed Specimen: Respiratory from Endotracheal Aspirate Updated: 01/11/22 0637     Respiratory Culture No S aureus isolated.      SERRATIA MARCESCENS  Many        KLEBSIELLA PNEUMONIAE ESBL  Moderate        PSEUDOMONAS AERUGINOSA   Moderate       Gram Stain (Respiratory) <10 epithelial cells per low power field.     Gram Stain (Respiratory) Rare WBC's     Gram Stain (Respiratory) Few Gram negative rods     Gram Stain (Respiratory) Rare yeast    Blood  culture x two cultures. Draw prior to antibiotics. [[id number]] Collected: 01/03/22 1950    Order Status: Completed Specimen: Blood Updated: 01/09/22 0612     Blood Culture, Routine No growth after 5 days.    Narrative:      Aerobic and anaerobic    Urine culture [[id number]]  (Abnormal)  (Susceptibility) Collected: 01/03/22 1943    Order Status: Completed Specimen: Urine Updated: 01/07/22 1553     Urine Culture, Routine PROTEUS MIRABILIS  >100,000 cfu/ml      Narrative:      Specimen Source->Urine    Blood culture x two cultures. Draw prior to antibiotics. [[id number]]  (Abnormal) Collected: 01/03/22 1950    Order Status: Completed Specimen: Blood Updated: 01/06/22 1313     Blood Culture, Routine Gram stain peds bottle: Gram positive cocci in clusters resembling Staph      Results called to and read back by:Yvonne Gamez RN 01/04/2022  23:08      COAGULASE-NEGATIVE STAPHYLOCOCCUS SPECIES  Organism is a probable contaminant      Narrative:      Aerobic and anaerobic          Impression:  Active Hospital Problems    Diagnosis  POA    *Sepsis [A41.9]  Yes    Acute cholecystitis [K81.0]  No    Hyponatremia [E87.1]  Yes    UTI (urinary tract infection) [N39.0]  Yes    Ventilator associated pneumonia [J95.851]  Yes    Essential hypertension [I10]  Yes    Gastroesophageal reflux disease [K21.9]  Yes    Malnutrition of moderate degree [E44.0]  Yes    Chronic respiratory failure with hypoxia [J96.11]  Yes    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  Added automatically from request for surgery [id number]        Resolved Hospital Problems   No resolved problems to display.               Plan:     - continue vent, no weaning  -  antibiotics per ID  - lovenox subq for DVT prophylaxis  - pepcid for GI prophylaxis  - wound care   - continue midodrine  - monitor Hgb  - tube feeds restart when appropriate  - surgery following - s/p IR PCT    Dora Montesinos MD  Pulmonary & Critical Care Medicine                            .

## 2022-01-13 NOTE — SUBJECTIVE & OBJECTIVE
Interval History:  Status post percutaneous cholecystostomy drain placed today.  Sputum culture grew Pseudomonas, Klebsiella pneumoniae (ESBL species) and Serratia species. Blood culture with CoNS- contaminant.  Infectious Disease started intravenous Avycaz. Urine with Proteus species.  Peg tube feeding at goal.  Significant gastric residual noted.  Pulmonary secretions suctioning gastric contents.  Tube feed is being held.  CT abdomen suggested acute cholecystitis.  Interventional Radiology is being consulted for percutaneous cholecystostomy drain placement.    Review of Systems   Unable to perform ROS: Patient nonverbal     Objective:     Vital Signs (Most Recent):  Temp: 97.7 °F (36.5 °C) (01/13/22 0615)  Pulse: 80 (80) (01/13/22 0709)  Resp: (!) 25 (01/13/22 0709)  BP: (!) 115/59 (01/13/22 0400)  SpO2: 100 % (01/13/22 0709) Vital Signs (24h Range):  Temp:  [97.2 °F (36.2 °C)-98.78 °F (37.1 °C)] 97.7 °F (36.5 °C)  Pulse:  [71-95] 80  Resp:  [18-35] 25  SpO2:  [100 %] 100 %  BP: (106-141)/(59-76) 115/59     Weight: 74.4 kg (164 lb 0.4 oz)  Body mass index is 32.03 kg/m².    Intake/Output Summary (Last 24 hours) at 1/13/2022 0942  Last data filed at 1/13/2022 0639  Gross per 24 hour   Intake 4221.22 ml   Output 4200 ml   Net 21.22 ml      Physical Exam  Vitals and nursing note reviewed.   Constitutional:       General: She is not in acute distress.     Appearance: She is well-developed. She is ill-appearing.      Comments: Chronic trach/vent dependent   HENT:      Head: Normocephalic and atraumatic.      Mouth/Throat:      Mouth: Mucous membranes are dry.   Cardiovascular:      Rate and Rhythm: Normal rate and regular rhythm.   Pulmonary:      Effort: Pulmonary effort is normal. No respiratory distress.      Breath sounds: Rhonchi present.   Abdominal:      General: Bowel sounds are normal.      Palpations: Abdomen is soft.      Tenderness: There is no abdominal tenderness.      Comments: g tube in place    Musculoskeletal:      Cervical back: Normal range of motion and neck supple.      Comments: Contractures to hands   Skin:     General: Skin is warm and dry.   Neurological:      GCS: GCS eye subscore is 1. GCS verbal subscore is 1. GCS motor subscore is 1.   Psychiatric:      Comments: Unable to assess         Significant Labs:  Lab Results   Component Value Date    WBC 5.97 01/12/2022    HGB 7.6 (L) 01/12/2022    HCT 24.3 (L) 01/12/2022    MCV 88 01/12/2022     01/12/2022       CMP  Sodium   Date Value Ref Range Status   01/13/2022 130 (L) 136 - 145 mmol/L Final     Potassium   Date Value Ref Range Status   01/13/2022 3.9 3.5 - 5.1 mmol/L Final     Chloride   Date Value Ref Range Status   01/13/2022 95 95 - 110 mmol/L Final     CO2   Date Value Ref Range Status   01/13/2022 25 23 - 29 mmol/L Final     Glucose   Date Value Ref Range Status   01/13/2022 89 70 - 110 mg/dL Final     BUN   Date Value Ref Range Status   01/13/2022 4 (L) 6 - 20 mg/dL Final     Creatinine   Date Value Ref Range Status   01/13/2022 0.4 (L) 0.5 - 1.4 mg/dL Final     Calcium   Date Value Ref Range Status   01/13/2022 8.5 (L) 8.7 - 10.5 mg/dL Final     Total Protein   Date Value Ref Range Status   01/13/2022 6.1 6.0 - 8.4 g/dL Final     Albumin   Date Value Ref Range Status   01/13/2022 1.9 (L) 3.5 - 5.2 g/dL Final     Total Bilirubin   Date Value Ref Range Status   01/13/2022 0.4 0.1 - 1.0 mg/dL Final     Comment:     For infants and newborns, interpretation of results should be based  on gestational age, weight and in agreement with clinical  observations.    Premature Infant recommended reference ranges:  Up to 24 hours.............<8.0 mg/dL  Up to 48 hours............<12.0 mg/dL  3-5 days..................<15.0 mg/dL  6-29 days.................<15.0 mg/dL       Alkaline Phosphatase   Date Value Ref Range Status   01/13/2022 135 55 - 135 U/L Final     AST   Date Value Ref Range Status   01/13/2022 13 10 - 40 U/L Final     ALT    Date Value Ref Range Status   01/13/2022 13 10 - 44 U/L Final     Anion Gap   Date Value Ref Range Status   01/13/2022 10 8 - 16 mmol/L Final     eGFR if    Date Value Ref Range Status   01/13/2022 >60 >60 mL/min/1.73 m^2 Final     eGFR if non    Date Value Ref Range Status   01/13/2022 >60 >60 mL/min/1.73 m^2 Final     Comment:     Calculation used to obtain the estimated glomerular filtration  rate (eGFR) is the CKD-EPI equation.        Microbiology Results (last 7 days)     Procedure Component Value Units Date/Time    Blood culture [[id number]] Collected: 01/12/22 1627    Order Status: Completed Specimen: Blood from Peripheral, Right Hand Updated: 01/13/22 0515     Blood Culture, Routine No Growth to date    Blood culture [[id number]] Collected: 01/12/22 1627    Order Status: Completed Specimen: Blood from Peripheral, Left Hand Updated: 01/13/22 0515     Blood Culture, Routine No Growth to date    Culture, Respiratory with Gram Stain [[id number]]  (Abnormal)  (Susceptibility) Collected: 01/05/22 2009    Order Status: Completed Specimen: Respiratory from Endotracheal Aspirate Updated: 01/11/22 0637     Respiratory Culture No S aureus isolated.      SERRATIA MARCESCENS  Many        KLEBSIELLA PNEUMONIAE ESBL  Moderate        PSEUDOMONAS AERUGINOSA   Moderate       Gram Stain (Respiratory) <10 epithelial cells per low power field.     Gram Stain (Respiratory) Rare WBC's     Gram Stain (Respiratory) Few Gram negative rods     Gram Stain (Respiratory) Rare yeast    Blood culture x two cultures. Draw prior to antibiotics. [[id number]] Collected: 01/03/22 1950    Order Status: Completed Specimen: Blood Updated: 01/09/22 0612     Blood Culture, Routine No growth after 5 days.    Narrative:      Aerobic and anaerobic    Urine culture [[id number]]  (Abnormal)  (Susceptibility) Collected: 01/03/22 1943    Order Status: Completed Specimen: Urine Updated: 01/07/22 1553     Urine Culture,  Routine PROTEUS MIRABILIS  >100,000 cfu/ml      Narrative:      Specimen Source->Urine    Blood culture x two cultures. Draw prior to antibiotics. [[id number]]  (Abnormal) Collected: 01/03/22 1950    Order Status: Completed Specimen: Blood Updated: 01/06/22 1313     Blood Culture, Routine Gram stain peds bottle: Gram positive cocci in clusters resembling Staph      Results called to and read back by:Yvonne Gamez RN 01/04/2022  23:08      COAGULASE-NEGATIVE STAPHYLOCOCCUS SPECIES  Organism is a probable contaminant      Narrative:      Aerobic and anaerobic          Significant Imaging:  CXR:  Worsening airspace opacities in bilateral lungs, suspicious for multifocal pneumonia.    CXR: Resolving intrapulmonary infiltrates with a small amount of consolidation remaining at the right lung base. Tracheostomy tube in position.    KUB: No acute abdominal process.    CXR: Resolving intrapulmonary infiltrates with a small amount of consolidation remaining at the right lung base. Tracheostomy tube in position.    CXR: Right lung infiltrate without significant change.    KUB:  No acute abdominal process.    CT abdomen and pelvis with contrast:  Features of acute cholecystitis.  Two stones appear present within the cystic duct.  No common duct stones identified. Small bilateral pleural effusions and patchy bibasilar airspace disease compatible with pneumonia.

## 2022-01-13 NOTE — CARE UPDATE
01/13/22 0709   Patient Assessment/Suction   Level of Consciousness (AVPU) responds to voice   Respiratory Effort Unlabored;Normal   Expansion/Accessory Muscles/Retractions no use of accessory muscles   All Lung Fields Breath Sounds diminished   Rhythm/Pattern, Respiratory assisted mechanically   Cough Frequency with stimulation   Cough Type assisted   Suction Method tracheal   $ Suction Charges Inline Suction Procedure Stat Charge   Secretions Amount moderate   Secretions Color yellow;red-streaked   Secretions Characteristics thick   PRE-TX-O2   O2 Device (Oxygen Therapy) ventilator   $ Is the patient on Low Flow Oxygen? Yes   Oxygen Concentration (%) 50   SpO2 100 %   Pulse Oximetry Type Continuous   $ Pulse Oximetry - Multiple Charge Pulse Oximetry - Multiple   Pulse 80  (80)   Resp (!) 25   Aerosol Therapy   $ Aerosol Therapy Charges Aerosol Treatment   Respiratory Treatment Status (SVN) given   Treatment Route (SVN) in-line;ventilator   Patient Position (SVN) HOB elevated   Post Treatment Assessment (SVN) breath sounds unchanged   Signs of Intolerance (SVN) none   Breath Sounds Post-Respiratory Treatment   Throughout All Fields Post-Treatment All Fields   Throughout All Fields Post-Treatment diminished   Post-treatment Heart Rate (beats/min) 83   Post-treatment Resp Rate (breaths/min) 24   Skin Integrity   $ Wound Care Tech Time 15 min   Area Observed Neck under tracheostomy   Skin Appearance without discoloration   Barrier used?   (drain sponge)   Barrier Changed? No        Surgical Airway Portex Cuffed   No placement date or time found.   Present Prior to Hospital Arrival?: Yes  Type: Tracheostomy  Brand: Portex  Airway Device Size: 8.0  Style: Cuffed   Cuff Pressure 32 cm H2O   Cuff Inflation? Inflated   Status Secured   Site Assessment Clean;Dry;No drainage;No bleeding   Vent Select   Conventional Vent Y   $ Ventilator Subsequent 1   Charged w/in last 24h YES   Preset Conventional Ventilator Settings    Vent Type NKV-550   Ventilation Type VC   Vent Mode A/CMV-VC   Set Rate 24 BPM   Vt Set 350 mL   PEEP/CPAP 5 cmH20   Patient Ventilator Parameters   Resp Rate Total 24 br/min   All Fields Breath Sounds diminished   Peak Airway Pressure 27.6 cmH2O   Mean Airway Pressure 13.6 cmH20   Exhaled Vt 296 mL   Total Ve 8.8 mL   Conventional Ventilator Alarms   Alarms On Y   Ve High Alarm 20 L/min   Ve Low Alarm 3 L/min   Resp Rate High Alarm 50 br/min   Apnea Rate 15   T Apnea 20 sec(s)   Ready to Wean/Extubation Screen   FIO2<=50 (chart decimal) 0.5   MV<16L (chart vol.) 8.8   PEEP <=8 (chart #) 5   Ready to Wean Parameters   F/VT Ratio<105 (RSBI) (!) 84.46

## 2022-01-14 PROBLEM — J96.21 ACUTE ON CHRONIC RESPIRATORY FAILURE WITH HYPOXIA: Status: ACTIVE | Noted: 2021-05-10

## 2022-01-14 LAB
ALBUMIN SERPL BCP-MCNC: 1.9 G/DL (ref 3.5–5.2)
ALP SERPL-CCNC: 137 U/L (ref 55–135)
ALT SERPL W/O P-5'-P-CCNC: 16 U/L (ref 10–44)
ANION GAP SERPL CALC-SCNC: 10 MMOL/L (ref 8–16)
AST SERPL-CCNC: 13 U/L (ref 10–40)
BASOPHILS # BLD AUTO: 0.02 K/UL (ref 0–0.2)
BASOPHILS NFR BLD: 0.4 % (ref 0–1.9)
BILIRUB SERPL-MCNC: 0.4 MG/DL (ref 0.1–1)
BUN SERPL-MCNC: 5 MG/DL (ref 6–20)
CALCIUM SERPL-MCNC: 8.5 MG/DL (ref 8.7–10.5)
CHLORIDE SERPL-SCNC: 95 MMOL/L (ref 95–110)
CO2 SERPL-SCNC: 27 MMOL/L (ref 23–29)
CREAT SERPL-MCNC: 0.4 MG/DL (ref 0.5–1.4)
CRP SERPL-MCNC: 62.9 MG/L (ref 0–8.2)
DIFFERENTIAL METHOD: ABNORMAL
EOSINOPHIL # BLD AUTO: 0.2 K/UL (ref 0–0.5)
EOSINOPHIL NFR BLD: 4.6 % (ref 0–8)
ERYTHROCYTE [DISTWIDTH] IN BLOOD BY AUTOMATED COUNT: 16.7 % (ref 11.5–14.5)
EST. GFR  (AFRICAN AMERICAN): >60 ML/MIN/1.73 M^2
EST. GFR  (NON AFRICAN AMERICAN): >60 ML/MIN/1.73 M^2
GLUCOSE SERPL-MCNC: 94 MG/DL (ref 70–110)
HCT VFR BLD AUTO: 24.5 % (ref 37–48.5)
HGB BLD-MCNC: 7.7 G/DL (ref 12–16)
IMM GRANULOCYTES # BLD AUTO: 0.03 K/UL (ref 0–0.04)
IMM GRANULOCYTES NFR BLD AUTO: 0.6 % (ref 0–0.5)
LYMPHOCYTES # BLD AUTO: 1.4 K/UL (ref 1–4.8)
LYMPHOCYTES NFR BLD: 27.4 % (ref 18–48)
MAGNESIUM SERPL-MCNC: 1.9 MG/DL (ref 1.6–2.6)
MCH RBC QN AUTO: 27.4 PG (ref 27–31)
MCHC RBC AUTO-ENTMCNC: 31.4 G/DL (ref 32–36)
MCV RBC AUTO: 87 FL (ref 82–98)
MONOCYTES # BLD AUTO: 0.8 K/UL (ref 0.3–1)
MONOCYTES NFR BLD: 14.9 % (ref 4–15)
NEUTROPHILS # BLD AUTO: 2.6 K/UL (ref 1.8–7.7)
NEUTROPHILS NFR BLD: 52.1 % (ref 38–73)
NRBC BLD-RTO: 1 /100 WBC
PHOSPHATE SERPL-MCNC: 3 MG/DL (ref 2.7–4.5)
PLATELET # BLD AUTO: 348 K/UL (ref 150–450)
PMV BLD AUTO: 9.5 FL (ref 9.2–12.9)
POCT GLUCOSE: 107 MG/DL (ref 70–110)
POCT GLUCOSE: 116 MG/DL (ref 70–110)
POCT GLUCOSE: 121 MG/DL (ref 70–110)
POTASSIUM SERPL-SCNC: 3.9 MMOL/L (ref 3.5–5.1)
PROT SERPL-MCNC: 6.2 G/DL (ref 6–8.4)
RBC # BLD AUTO: 2.81 M/UL (ref 4–5.4)
SODIUM SERPL-SCNC: 132 MMOL/L (ref 136–145)
WBC # BLD AUTO: 5.03 K/UL (ref 3.9–12.7)

## 2022-01-14 PROCEDURE — S5010 5% DEXTROSE AND 0.45% SALINE: HCPCS | Performed by: INTERNAL MEDICINE

## 2022-01-14 PROCEDURE — 36415 COLL VENOUS BLD VENIPUNCTURE: CPT | Performed by: NURSE PRACTITIONER

## 2022-01-14 PROCEDURE — 84100 ASSAY OF PHOSPHORUS: CPT | Performed by: NURSE PRACTITIONER

## 2022-01-14 PROCEDURE — 63600175 PHARM REV CODE 636 W HCPCS: Performed by: SURGERY

## 2022-01-14 PROCEDURE — 99233 SBSQ HOSP IP/OBS HIGH 50: CPT | Mod: ,,, | Performed by: INTERNAL MEDICINE

## 2022-01-14 PROCEDURE — 94640 AIRWAY INHALATION TREATMENT: CPT

## 2022-01-14 PROCEDURE — 85025 COMPLETE CBC W/AUTO DIFF WBC: CPT | Performed by: INTERNAL MEDICINE

## 2022-01-14 PROCEDURE — 83735 ASSAY OF MAGNESIUM: CPT | Performed by: NURSE PRACTITIONER

## 2022-01-14 PROCEDURE — 94761 N-INVAS EAR/PLS OXIMETRY MLT: CPT

## 2022-01-14 PROCEDURE — 99233 PR SUBSEQUENT HOSPITAL CARE,LEVL III: ICD-10-PCS | Mod: ,,, | Performed by: INTERNAL MEDICINE

## 2022-01-14 PROCEDURE — 25000242 PHARM REV CODE 250 ALT 637 W/ HCPCS: Performed by: NURSE PRACTITIONER

## 2022-01-14 PROCEDURE — 94003 VENT MGMT INPAT SUBQ DAY: CPT

## 2022-01-14 PROCEDURE — 99900026 HC AIRWAY MAINTENANCE (STAT)

## 2022-01-14 PROCEDURE — 27000207 HC ISOLATION

## 2022-01-14 PROCEDURE — 63600175 PHARM REV CODE 636 W HCPCS: Performed by: STUDENT IN AN ORGANIZED HEALTH CARE EDUCATION/TRAINING PROGRAM

## 2022-01-14 PROCEDURE — 25000003 PHARM REV CODE 250: Performed by: INTERNAL MEDICINE

## 2022-01-14 PROCEDURE — 80053 COMPREHEN METABOLIC PANEL: CPT | Performed by: NURSE PRACTITIONER

## 2022-01-14 PROCEDURE — 20000000 HC ICU ROOM

## 2022-01-14 PROCEDURE — 25000003 PHARM REV CODE 250: Performed by: NURSE PRACTITIONER

## 2022-01-14 PROCEDURE — 27100080 HC AIRWAY ADAPTER-END TIDAL CO2

## 2022-01-14 PROCEDURE — 27201258 HC MOISTURE TRAP-END TIDAL C02

## 2022-01-14 PROCEDURE — 27000221 HC OXYGEN, UP TO 24 HOURS

## 2022-01-14 PROCEDURE — 99900035 HC TECH TIME PER 15 MIN (STAT)

## 2022-01-14 PROCEDURE — 63600175 PHARM REV CODE 636 W HCPCS: Performed by: NURSE PRACTITIONER

## 2022-01-14 PROCEDURE — 25000003 PHARM REV CODE 250: Performed by: SURGERY

## 2022-01-14 PROCEDURE — 25000003 PHARM REV CODE 250: Performed by: STUDENT IN AN ORGANIZED HEALTH CARE EDUCATION/TRAINING PROGRAM

## 2022-01-14 PROCEDURE — 99233 PR SUBSEQUENT HOSPITAL CARE,LEVL III: ICD-10-PCS | Mod: S$GLB,,, | Performed by: STUDENT IN AN ORGANIZED HEALTH CARE EDUCATION/TRAINING PROGRAM

## 2022-01-14 PROCEDURE — 86140 C-REACTIVE PROTEIN: CPT | Performed by: STUDENT IN AN ORGANIZED HEALTH CARE EDUCATION/TRAINING PROGRAM

## 2022-01-14 PROCEDURE — 99233 SBSQ HOSP IP/OBS HIGH 50: CPT | Mod: S$GLB,,, | Performed by: STUDENT IN AN ORGANIZED HEALTH CARE EDUCATION/TRAINING PROGRAM

## 2022-01-14 PROCEDURE — 99900022

## 2022-01-14 PROCEDURE — 36415 COLL VENOUS BLD VENIPUNCTURE: CPT | Performed by: STUDENT IN AN ORGANIZED HEALTH CARE EDUCATION/TRAINING PROGRAM

## 2022-01-14 RX ADMIN — MORPHINE SULFATE 2 MG: 2 INJECTION, SOLUTION INTRAMUSCULAR; INTRAVENOUS at 01:01

## 2022-01-14 RX ADMIN — IPRATROPIUM BROMIDE AND ALBUTEROL SULFATE 3 ML: 2.5; .5 SOLUTION RESPIRATORY (INHALATION) at 08:01

## 2022-01-14 RX ADMIN — CEFTAZIDIME, AVIBACTAM 2.5 G: 2; .5 POWDER, FOR SOLUTION INTRAVENOUS at 06:01

## 2022-01-14 RX ADMIN — HYPROMELLOSE 2910 1 DROP: 5 SOLUTION OPHTHALMIC at 08:01

## 2022-01-14 RX ADMIN — MIDODRINE HYDROCHLORIDE 10 MG: 5 TABLET ORAL at 08:01

## 2022-01-14 RX ADMIN — CHLORHEXIDINE GLUCONATE 0.12% ORAL RINSE 15 ML: 1.2 LIQUID ORAL at 09:01

## 2022-01-14 RX ADMIN — FAMOTIDINE 20 MG: 10 INJECTION INTRAVENOUS at 09:01

## 2022-01-14 RX ADMIN — DEXTROSE AND SODIUM CHLORIDE: 5; .45 INJECTION, SOLUTION INTRAVENOUS at 03:01

## 2022-01-14 RX ADMIN — CEFTAZIDIME, AVIBACTAM 2.5 G: 2; .5 POWDER, FOR SOLUTION INTRAVENOUS at 11:01

## 2022-01-14 RX ADMIN — HYPROMELLOSE 2910 1 DROP: 5 SOLUTION OPHTHALMIC at 09:01

## 2022-01-14 RX ADMIN — IPRATROPIUM BROMIDE AND ALBUTEROL SULFATE 3 ML: 2.5; .5 SOLUTION RESPIRATORY (INHALATION) at 07:01

## 2022-01-14 RX ADMIN — MIDODRINE HYDROCHLORIDE 10 MG: 5 TABLET ORAL at 03:01

## 2022-01-14 RX ADMIN — HYPROMELLOSE 2910 1 DROP: 5 SOLUTION OPHTHALMIC at 03:01

## 2022-01-14 RX ADMIN — DEXTROSE AND SODIUM CHLORIDE 100 ML/HR: 5; .45 INJECTION, SOLUTION INTRAVENOUS at 04:01

## 2022-01-14 RX ADMIN — IPRATROPIUM BROMIDE AND ALBUTEROL SULFATE 3 ML: 2.5; .5 SOLUTION RESPIRATORY (INHALATION) at 11:01

## 2022-01-14 RX ADMIN — FAMOTIDINE 20 MG: 10 INJECTION INTRAVENOUS at 08:01

## 2022-01-14 RX ADMIN — CEFTAZIDIME, AVIBACTAM 2.5 G: 2; .5 POWDER, FOR SOLUTION INTRAVENOUS at 03:01

## 2022-01-14 RX ADMIN — CHLORHEXIDINE GLUCONATE 0.12% ORAL RINSE 15 ML: 1.2 LIQUID ORAL at 08:01

## 2022-01-14 RX ADMIN — MIDODRINE HYDROCHLORIDE 10 MG: 5 TABLET ORAL at 09:01

## 2022-01-14 RX ADMIN — IPRATROPIUM BROMIDE AND ALBUTEROL SULFATE 3 ML: 2.5; .5 SOLUTION RESPIRATORY (INHALATION) at 03:01

## 2022-01-14 RX ADMIN — ENOXAPARIN SODIUM 40 MG: 40 INJECTION SUBCUTANEOUS at 05:01

## 2022-01-14 RX ADMIN — IPRATROPIUM BROMIDE AND ALBUTEROL SULFATE 3 ML: 2.5; .5 SOLUTION RESPIRATORY (INHALATION) at 01:01

## 2022-01-14 NOTE — PROGRESS NOTES
Consult Note  Infectious Disease    Reason for Consult:  Acute cholecystitis/ pneumonia    HPI: Genna Felix is a 61 y.o. female resident of [facility], with past medical history of anoxic brain injury status post tracheostomy/peg tube, prior PE, hypertension, GERD sent for tachycardia.  Patient with prior admission to [facility] for pneumonia, discharged 12/27/21 on Bactrim.  Information obtained from medical chart.  Patient is nonverbal at baseline, no family present.  In the ER, tachycardic, trach to vent.   Lab significant for WBC of 14, PMN:  87.5%  UA grossly positive, urine culture grew Proteus mirabilis likely ESBL.  Blood cultures from admission 1/4 bottles grew CoNS, likely a contaminant.  Chest x-ray right lung airspace disease c/w pneumonia.  Respiratory culture from 01/05 with multiple organisms including Serratia marcescens, ESBL Klebsiella pneumoniae, and  Pseudomonas.    Empirically started on Vancomycin and Zosyn, switched to Meropenem 1/9.     Further workup including CT scan abdomen/pelvis revealed findings c/w acute cholecystitis with 2 stones in the cystic duct.  HIDA scan suggestive of cystic duct obstruction.     Plan for IR guided percutaneous drainage on 1/13.     ID consult for acute cholecystitis and antibiotic recommendations.     INTERVAL HISTORY:   1/13:  Interim reviewed, patient status post IR guided per ирина.  Large amount of purulent bilious fluid drained, drain in place, fluid sent for cultures, in process.  Tachypneic, hypertensive, afebrile. Tracheal secretions remain thick and purulent. Repeat blood cultures 1/12 x 2 sets no growth. .    1/14: Patient seen and examined at bedside. No acute events overnight, she is trach to vent FiO2 50%, cloudy white secretions.WBC: 5, no left shift, stable. Lab called, sensitivities for Avycaz will be available later today or tomorrow morning. IR drain in place, draining purulent bilious fluid. Repeat blood cultures no growth.        Review of patient's allergies indicates:  No Known Allergies  Past Medical History:   Diagnosis Date    Anoxic brain damage 07/2020    Bedbound 07/2020    Cardiac arrest 07/2020    Cirrhosis     GERD (gastroesophageal reflux disease)     Hemangioma of intra-abdominal structure     Hypertension     Nursing home resident     Protein calorie malnutrition     Pulmonary embolus     Respiratory distress     S/P percutaneous endoscopic gastrostomy (PEG) tube placement 07/2020    Total self-care deficit 07/2020    Tracheostomy dependence     7/2020     Past Surgical History:   Procedure Laterality Date    PEG W/TRACHEOSTOMY PLACEMENT  07/27/2020    SKIN GRAFT      buttocks     Social History     Tobacco Use    Smoking status: Never Smoker    Smokeless tobacco: Never Used   Substance Use Topics    Alcohol use: Not Currently        Family History   Problem Relation Age of Onset    No Known Problems Mother     No Known Problems Father          Review of Systems:   Unable to obtain, patient is non-verbal at baseline     EXAM & DIAGNOSTICS REVIEWED:   Vitals:     Temp:  [95.72 °F (35.4 °C)-99.9 °F (37.7 °C)]   Temp: 99.5 °F (37.5 °C) (01/14/22 0803)  Pulse: 84 (01/14/22 0806)  Resp: (!) 24 (01/14/22 0806)  BP: 102/61 (01/14/22 0600)  SpO2: 100 % (01/14/22 0806)    Intake/Output Summary (Last 24 hours) at 1/14/2022 0915  Last data filed at 1/14/2022 0846  Gross per 24 hour   Intake 2531.7 ml   Output 3500 ml   Net -968.3 ml       General:  Chronically ill-appearing, trach to vent FiO2 50%, in NAD. Alert, moving head, not following commands    Eyes:  Hyperemic conjunctiva both eyes, improving, anicteric, PERRL  ENT:  Limited, moist mucus membranes, no thrush  Neck:  Supple  Lungs: Rhonchi R, L clear, thick white secretions  Heart:  S1/S2+, regular rhythm, no murmurs  Abd:  RUQ IR drain in place, purulent bilious fluid    PEG tube in place, no evidence of redness or purulent drainage from ostomy, +BS, soft,  non tender, non distended  :  Hamilton, cloudy urine  Musc:  Flexure contractures on hands  Skin:  Warm, no rash, thickening of nails   Wound:   Neuro: Not following commands  Psych:  Calm  Lymphatic:     No cervical, supraclavicular nodes  Extrem: No LE edema b/l  VAD:         Isolation:     1/4/22:       Sacrum  Right hand    Right elbow      General Labs reviewed:  Recent Labs   Lab 01/11/22  0416 01/12/22 0330 01/14/22 0339   WBC 10.49 5.97 5.03   HGB 7.6* 7.6* 7.7*   HCT 24.3* 24.3* 24.5*    320 348       Recent Labs   Lab 01/12/22 0330 01/12/22 0330 01/12/22 1627 01/13/22 0334 01/14/22 0339   *  --   --  130* 132*   K 3.6   < > 3.9 3.9 3.9   CL 99  --   --  95 95   CO2 26  --   --  25 27   BUN 7  --   --  4* 5*   CREATININE 0.4*  --   --  0.4* 0.4*   CALCIUM 8.7  --   --  8.5* 8.5*   PROT 6.1  --   --  6.1 6.2   BILITOT 0.5  --   --  0.4 0.4   ALKPHOS 144*  --   --  135 137*   ALT 12  --   --  13 16   AST 10  --   --  13 13    < > = values in this interval not displayed.     Recent Labs   Lab 01/12/22 1627   .8*     No results for input(s): SEDRATE in the last 168 hours.    Estimated Creatinine Clearance: 135.1 mL/min (A) (based on SCr of 0.4 mg/dL (L)).     Micro:  Microbiology Results (last 7 days)     Procedure Component Value Units Date/Time    Blood culture [[id number]] Collected: 01/12/22 1627    Order Status: Completed Specimen: Blood from Peripheral, Right Hand Updated: 01/13/22 2312     Blood Culture, Routine No Growth to date      No Growth to date    Blood culture [[id number]] Collected: 01/12/22 1627    Order Status: Completed Specimen: Blood from Peripheral, Left Hand Updated: 01/13/22 2312     Blood Culture, Routine No Growth to date      No Growth to date    Aerobic culture [[id number]] Collected: 01/13/22 1120    Order Status: Sent Specimen: Body Fluid from Gallbladder Updated: 01/13/22 1753    Culture, Anaerobe [[id number]] Collected: 01/13/22 1120    Order Status: Sent  Specimen: Body Fluid from Gallbladder Updated: 01/13/22 1751    Culture, Respiratory with Gram Stain [[id number]]  (Abnormal)  (Susceptibility) Collected: 01/05/22 2009    Order Status: Completed Specimen: Respiratory from Endotracheal Aspirate Updated: 01/13/22 1114     Respiratory Culture No S aureus isolated.      SERRATIA MARCESCENS  Many        KLEBSIELLA PNEUMONIAE ESBL  Moderate        PSEUDOMONAS AERUGINOSA   Moderate       Gram Stain (Respiratory) <10 epithelial cells per low power field.     Gram Stain (Respiratory) Rare WBC's     Gram Stain (Respiratory) Few Gram negative rods     Gram Stain (Respiratory) Rare yeast    Blood culture x two cultures. Draw prior to antibiotics. [[id number]] Collected: 01/03/22 1950    Order Status: Completed Specimen: Blood Updated: 01/09/22 0612     Blood Culture, Routine No growth after 5 days.    Narrative:      Aerobic and anaerobic    Urine culture [[id number]]  (Abnormal)  (Susceptibility) Collected: 01/03/22 1943    Order Status: Completed Specimen: Urine Updated: 01/07/22 1553     Urine Culture, Routine PROTEUS MIRABILIS  >100,000 cfu/ml      Narrative:      Specimen Source->Urine          Imaging Reviewed:  CXRs  CT abdomen/pelvis   HIDA scan      IMPRESSION & PLAN     1. Sepsis resolved, multifactorial; right pneumonia/UTI/acute cholecystitis s/p IR guided perc ирина 1/13   Bilious fluid cultures in process   Urine culture grew Proteus mirabilis likely ESBL   Blood cultures from admission 1/4 bottles grew CoNS, likely a contaminant   Respiratory culture 1/5 with multiple organisms; Serratia marcescens, ESBL Klebsiella pneumoniae, and  Pseudomonas, the latter susceptible to Avycaz   Repeat blood cultures x 2 sets no growth pending final    Procalcitonin 0.57     2. Anoxic brain injury/bed-bound, non-verbal, debility     Recommendations:  Continue Avycaz 2.5g IV q8h, lab called to set up plates for ESBL Klebsiella and Serratia, available 1/15  Trend CRP and  procalcitonin   Artificial tears 3 times a day both eyes  Aspiration precautions  Oral hygiene  Follow cultures  Will follow     D/w Dr Siddiqi, nursing     Medical Decision Making during this encounter was  [_] Low Complexity  [_] Moderate Complexity  [xx] High Complexity

## 2022-01-14 NOTE — PHYSICIAN QUERY
PT Name: Genna Felix  MR #: [id number]     Documentation Clarification      CDS/: Cyrus ZAZUETA, RN-BC  Contact information: [email or web address]    This form is a permanent document in the medical record.     Query Date: January 14, 2022    By submitting this query, we are merely seeking further clarification of documentation.  Please utilize your independent clinical judgment when addressing the question(s) below.     The medical record contains the following:  Indicators Supporting Clinical Findings Location in Medical Record   X Dementia Anoxic Brain Injury Hospital Medicine, H&P, 1/3   X Contractures Contractures to hands  Hospital Medicine, H&P, 1/3   X Total Care or Max Assist Total self-care deficit-Nursing Home Resident ED Provider Note 1/3 PMH   X Immobility or Debility The patient's activity level is bedbound Pulmonary 1/4   X Chronic Illness: anoxic brain injury, chronic respiratory failure with vent dependence, PEG, cirrhosis, GERD, HTN, seizure disorder, resident of [facility] Pulmonary 1/4    Past history of stroke      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  Turn q2h  Pt vent dependent-consult pulmonology for vent management Hospital Medicine, H&P, 1/3   X Other GCS 3, non-verbal Hospital Medicine, H&P, 1/3     Provider, please specify the diagnosis or diagnoses associated with above clinical findings:     [   ] Critical illness / intensive care myopathy   [  x ] Functional quadriplegia   [   ] General debility / deconditioning   [   ] Other diagnosis (please specify):____________________________   [  ] Clinically Undetermined      Please document in your progress notes daily for the duration of treatment, until resolved, and include in your discharge summary.

## 2022-01-14 NOTE — PLAN OF CARE
Currently awaiting c&s of Gallbladder drainage. Possible LTAC work up for this patient.        01/14/22 1216   Discharge Reassessment   Assessment Type Discharge Planning Reassessment   Discharge Plan A Long-term acute care facility (LTAC)   Discharge Plan B Return to Nursing Home

## 2022-01-14 NOTE — PROGRESS NOTES
[facility] Hospital Medicine  Progress Note    Patient Name: Genna Felix  MRN: [id number]  Patient Class: IP- Inpatient   Admission Date: 1/3/2022  Length of Stay: 11 days  Attending Physician: Geeta Siddiqi MD  Primary Care Provider: Primary Doctor No        Subjective:     Principal Problem:Sepsis        HPI:  Genna Felix is a 61 year old female with a past medical history of anoxic brain injury s/p G tube and tracheostomy, PE, HTN, GERD who presented from [facility] for further evaluation of tachycardia.  She was discharged from this facility on 12/27/21 with a diagnosis of pneumonia with rx for bactrim.  In the ED she is found to be tachycardic with WBC 14K, lactic acid 2.6 and evidence of UTI.  CXR reveals worsening multifocal pneumonia.  Pt is nonverbal and cannot contribute to history of present illness, therefore further information is limited.  While in the ED, IV zosyn was initiated and she was give an IVF bolus.  She will be admitted to the service of hospital medicine for continued monitoring and treatment.      Overview/Hospital Course:  61-year-old female with past medical history of anoxic brain injury, PE, hypertension, GERD admitted to the hospital medicine service for UTI and worsening multifocal pneumonia.  She was placed on broad-spectrum antibiotics.  Pulmonologist was consulted.  Urine culture grew presumptive Proteus.  Sputum culture grew Pseudomonas species, Serratia species, Klebsiella pneumoniae (ESBL).  During hospital stay patient noted to have increase gastric residuals.  No definite abdominal obstruction identified.  Patient underwent a CT scan of the abdomen and pelvis which suggested acute cholecystitis for which General surgery team was consulted.  Patient underwent HIDA scan which confirm cystic duct obstruction.  General surgeon recommended percutaneous cholecystostomy drain placement which was done by Interventional Radiology on January 13, 2022.  Services of  infectious diseases specialist are being obtained who upgraded antibiotic therapy to intravenous Avycase.      Interval History:  Status post percutaneous cholecystostomy drain placed today.  Sputum culture grew Pseudomonas, Klebsiella pneumoniae (ESBL species) and Serratia species. Blood culture with CoNS- contaminant.  Infectious Disease started intravenous Avycaz. Urine with Proteus species.  Peg tube feeding at goal.  Significant gastric residual noted.  Pulmonary secretions suctioning gastric contents.  Tube feed is being held.  Patient underwent percutaneous cholecystostomy drain placement by Interventional Radiology on January 13, 2022. It is draining purulent discharge.    Review of Systems   Unable to perform ROS: Patient nonverbal     Objective:     Vital Signs (Most Recent):  Temp: 99.5 °F (37.5 °C) (01/14/22 0803)  Pulse: 84 (01/14/22 0806)  Resp: (!) 24 (01/14/22 0806)  BP: 102/61 (01/14/22 0600)  SpO2: 100 % (01/14/22 0806) Vital Signs (24h Range):  Temp:  [95.72 °F (35.4 °C)-99.9 °F (37.7 °C)] 99.5 °F (37.5 °C)  Pulse:  [] 84  Resp:  [22-55] 24  SpO2:  [96 %-100 %] 100 %  BP: ()/(53-98) 102/61     Weight: 73.1 kg (161 lb 2.5 oz)  Body mass index is 31.47 kg/m².    Intake/Output Summary (Last 24 hours) at 1/14/2022 0931  Last data filed at 1/14/2022 0846  Gross per 24 hour   Intake 2531.7 ml   Output 3500 ml   Net -968.3 ml      Physical Exam  Vitals and nursing note reviewed.   Constitutional:       General: She is not in acute distress.     Appearance: She is well-developed. She is ill-appearing.      Comments: Chronic trach/vent dependent   HENT:      Head: Normocephalic and atraumatic.      Mouth/Throat:      Mouth: Mucous membranes are dry.   Cardiovascular:      Rate and Rhythm: Normal rate and regular rhythm.   Pulmonary:      Effort: Pulmonary effort is normal. No respiratory distress.      Breath sounds: Rhonchi present.   Abdominal:      General: Bowel sounds are normal.       Palpations: Abdomen is soft.      Tenderness: There is no abdominal tenderness.      Comments: g tube in place   Musculoskeletal:      Cervical back: Normal range of motion and neck supple.      Comments: Contractures to hands   Skin:     General: Skin is warm and dry.   Neurological:      GCS: GCS eye subscore is 1. GCS verbal subscore is 1. GCS motor subscore is 1.   Psychiatric:      Comments: Unable to assess         Significant Labs:  Lab Results   Component Value Date    WBC 5.03 01/14/2022    HGB 7.7 (L) 01/14/2022    HCT 24.5 (L) 01/14/2022    MCV 87 01/14/2022     01/14/2022       CMP  Sodium   Date Value Ref Range Status   01/14/2022 132 (L) 136 - 145 mmol/L Final     Potassium   Date Value Ref Range Status   01/14/2022 3.9 3.5 - 5.1 mmol/L Final     Chloride   Date Value Ref Range Status   01/14/2022 95 95 - 110 mmol/L Final     CO2   Date Value Ref Range Status   01/14/2022 27 23 - 29 mmol/L Final     Glucose   Date Value Ref Range Status   01/14/2022 94 70 - 110 mg/dL Final     BUN   Date Value Ref Range Status   01/14/2022 5 (L) 6 - 20 mg/dL Final     Creatinine   Date Value Ref Range Status   01/14/2022 0.4 (L) 0.5 - 1.4 mg/dL Final     Calcium   Date Value Ref Range Status   01/14/2022 8.5 (L) 8.7 - 10.5 mg/dL Final     Total Protein   Date Value Ref Range Status   01/14/2022 6.2 6.0 - 8.4 g/dL Final     Albumin   Date Value Ref Range Status   01/14/2022 1.9 (L) 3.5 - 5.2 g/dL Final     Total Bilirubin   Date Value Ref Range Status   01/14/2022 0.4 0.1 - 1.0 mg/dL Final     Comment:     For infants and newborns, interpretation of results should be based  on gestational age, weight and in agreement with clinical  observations.    Premature Infant recommended reference ranges:  Up to 24 hours.............<8.0 mg/dL  Up to 48 hours............<12.0 mg/dL  3-5 days..................<15.0 mg/dL  6-29 days.................<15.0 mg/dL       Alkaline Phosphatase   Date Value Ref Range Status    01/14/2022 137 (H) 55 - 135 U/L Final     AST   Date Value Ref Range Status   01/14/2022 13 10 - 40 U/L Final     ALT   Date Value Ref Range Status   01/14/2022 16 10 - 44 U/L Final     Anion Gap   Date Value Ref Range Status   01/14/2022 10 8 - 16 mmol/L Final     eGFR if    Date Value Ref Range Status   01/14/2022 >60 >60 mL/min/1.73 m^2 Final     eGFR if non    Date Value Ref Range Status   01/14/2022 >60 >60 mL/min/1.73 m^2 Final     Comment:     Calculation used to obtain the estimated glomerular filtration  rate (eGFR) is the CKD-EPI equation.        Microbiology Results (last 7 days)     Procedure Component Value Units Date/Time    Blood culture [[id number]] Collected: 01/12/22 1627    Order Status: Completed Specimen: Blood from Peripheral, Right Hand Updated: 01/13/22 2312     Blood Culture, Routine No Growth to date      No Growth to date    Blood culture [[id number]] Collected: 01/12/22 1627    Order Status: Completed Specimen: Blood from Peripheral, Left Hand Updated: 01/13/22 2312     Blood Culture, Routine No Growth to date      No Growth to date    Aerobic culture [[id number]] Collected: 01/13/22 1120    Order Status: Sent Specimen: Body Fluid from Gallbladder Updated: 01/13/22 1753    Culture, Anaerobe [[id number]] Collected: 01/13/22 1120    Order Status: Sent Specimen: Body Fluid from Gallbladder Updated: 01/13/22 1751    Culture, Respiratory with Gram Stain [[id number]]  (Abnormal)  (Susceptibility) Collected: 01/05/22 2009    Order Status: Completed Specimen: Respiratory from Endotracheal Aspirate Updated: 01/13/22 1114     Respiratory Culture No S aureus isolated.      SERRATIA MARCESCENS  Many        KLEBSIELLA PNEUMONIAE ESBL  Moderate        PSEUDOMONAS AERUGINOSA   Moderate       Gram Stain (Respiratory) <10 epithelial cells per low power field.     Gram Stain (Respiratory) Rare WBC's     Gram Stain (Respiratory) Few Gram negative rods     Gram Stain  (Respiratory) Rare yeast    Blood culture x two cultures. Draw prior to antibiotics. [[id number]] Collected: 01/03/22 1950    Order Status: Completed Specimen: Blood Updated: 01/09/22 0612     Blood Culture, Routine No growth after 5 days.    Narrative:      Aerobic and anaerobic    Urine culture [[id number]]  (Abnormal)  (Susceptibility) Collected: 01/03/22 1943    Order Status: Completed Specimen: Urine Updated: 01/07/22 1553     Urine Culture, Routine PROTEUS MIRABILIS  >100,000 cfu/ml      Narrative:      Specimen Source->Urine          Significant Imaging:  CXR:  Worsening airspace opacities in bilateral lungs, suspicious for multifocal pneumonia.    CXR: Resolving intrapulmonary infiltrates with a small amount of consolidation remaining at the right lung base. Tracheostomy tube in position.    KUB: No acute abdominal process.    CXR: Resolving intrapulmonary infiltrates with a small amount of consolidation remaining at the right lung base. Tracheostomy tube in position.    CXR: Right lung infiltrate without significant change.    KUB:  No acute abdominal process.    CT abdomen and pelvis with contrast:  Features of acute cholecystitis.  Two stones appear present within the cystic duct.  No common duct stones identified. Small bilateral pleural effusions and patchy bibasilar airspace disease compatible with pneumonia.      Assessment/Plan:      * Sepsis  This patient does have evidence of infective focus  My overall impression is sepsis. Vital signs were reviewed and noted in progress note.  Antibiotics given-   Antibiotics (From admission, onward)            Start     Stop Route Frequency Ordered    01/06/22 0630  vancomycin 1.25 g in dextrose 5% 250 mL IVPB (ready to mix)         -- IV Every 18 hours 01/06/22 0611    01/04/22 0900  mupirocin 2 % ointment         01/09 0859 Nasl 2 times daily 01/04/22 0142    01/04/22 0400  piperacillin-tazobactam 4.5 g in dextrose 5 % 100 mL IVPB (ready to mix system)          "-- IV Every 8 hours (non-standard times) 01/03/22 2141 01/03/22 2241  vancomycin - pharmacy to dose  (vancomycin IVPB)        "And" Linked Group Details    -- IV pharmacy to manage frequency 01/03/22 2141        Cultures were taken-   Microbiology Results (last 7 days)     Procedure Component Value Units Date/Time    Culture, Respiratory with Gram Stain [[id number]]  (Abnormal) Collected: 01/05/22 2009    Order Status: Completed Specimen: Respiratory from Endotracheal Aspirate Updated: 01/07/22 1035     Respiratory Culture GRAM NEGATIVE CHRISTINA  Many  Identification and susceptibility pending       Gram Stain (Respiratory) <10 epithelial cells per low power field.     Gram Stain (Respiratory) Rare WBC's     Gram Stain (Respiratory) Few Gram negative rods     Gram Stain (Respiratory) Rare yeast    Blood culture x two cultures. Draw prior to antibiotics. [[id number]] Collected: 01/03/22 1950    Order Status: Completed Specimen: Blood Updated: 01/07/22 0612     Blood Culture, Routine No Growth to date      No Growth to date      No Growth to date      No Growth to date    Narrative:      Aerobic and anaerobic    Urine culture [[id number]]  (Abnormal)  (Susceptibility) Collected: 01/03/22 1943    Order Status: Completed Specimen: Urine Updated: 01/06/22 1535     Urine Culture, Routine PROTEUS MIRABILIS  >100,000 cfu/ml  Identification and susceptibility pending      Narrative:      Specimen Source->Urine    Blood culture x two cultures. Draw prior to antibiotics. [[id number]]  (Abnormal) Collected: 01/03/22 1950    Order Status: Completed Specimen: Blood Updated: 01/06/22 1313     Blood Culture, Routine Gram stain peds bottle: Gram positive cocci in clusters resembling Staph      Results called to and read back by:Yvonne Gamez RN 01/04/2022  23:08      COAGULASE-NEGATIVE STAPHYLOCOCCUS SPECIES  Organism is a probable contaminant      Narrative:      Aerobic and anaerobic        Latest lactate reviewed, they are-  No " results for input(s): LACTATE in the last 72 hours.    Organ dysfunction indicated by tachycardia, tachypnea  Source- respiratory, urine      Source control Achieved by- IV abx      Acute cholecystitis  Status post percutaneous cholecystostomy drain placement on January 13, 2022. Follow General surgery recommendations.  Continue intravenous antibiotic therapy as per ID specialist.        Hyponatremia  Follow BMP.       UTI (urinary tract infection)  IV meropenem; follow culture        Pressure injury of left buttock, stage 4  Consult wound care  Turn q2h        Ventilator associated pneumonia  Failed OP abx  Pt vent dependent-consult pulmonology for vent management  On intravenous meropenem and vancomycin.  Blood and sputum cx  Nebulizer treatments        Gastroesophageal reflux disease  Chronic; utilize pepcid acutely.      Essential hypertension  Chronic, stable.  Latest blood pressure and vitals reviewed-   Temp:  [96.08 °F (35.6 °C)-98.24 °F (36.8 °C)]   Pulse:  []   Resp:  [10-75]   BP: (113-160)/(62-98)   SpO2:  [94 %-99 %] .   Home meds for hypertension were reviewed and noted below.   No chronic antihypertensives noted. Review of home meds reveals midodrine which has been reordered.      Will utilize p.r.n. blood pressure medication only if patient's blood pressure greater than  180/110 and she develops symptoms such as worsening chest pain or shortness of breath.        Malnutrition of moderate degree  Nutrition consult.      Acute on chronic respiratory failure with hypoxia  Patient with Hypoxic Respiratory failure which is Chronic.  she is vent dependent. Supplemental oxygen was provided and noted- Vent Mode: A/CMV-VC  Oxygen Concentration (%):  [35] 35  Resp Rate Total:  [24 br/min-43 br/min] 24 br/min  Vt Set:  [350 mL] 350 mL  PEEP/CPAP:  [4.3 cmH20-8.2 cmH20] 5.3 cmH20.   Signs/symptoms of respiratory failure include- tachypnea and increased work of breathing. Contributing diagnoses includes -  Aspiration and Pneumonia Labs and images were reviewed. Patient Has not had a recent ABG. Will treat underlying causes.    PEG (percutaneous endoscopic gastrostomy) status  Noted; care per nursing.  TF per nutrition recs.  Slowly advance PEG tube feeding.        Tracheostomy dependence  Noted; care per nursing      Anoxic brain injury  Noted.  Turn q2h      Anemia of chronic disease  Patient's anemia is currently controlled. S/p 0 units of PRBCs.  Current CBC reviewed-   Lab Results   Component Value Date    HGB 8.2 (L) 01/06/2022    HCT 26.9 (L) 01/06/2022    MCV 92 01/06/2022     01/06/2022     Monitor serial CBC and transfuse if patient becomes hemodynamically unstable, symptomatic or H/H drops below 7/21.           VTE Risk Mitigation (From admission, onward)         Ordered     enoxaparin injection 40 mg  Daily         01/04/22 0034     IP VTE HIGH RISK PATIENT  Once         01/04/22 0034     Place sequential compression device  Until discontinued         01/04/22 0034                Discharge Planning   [place]: TBD      Code Status: Full Code   Is the patient medically ready for discharge?:     Reason for patient still in hospital (select all that apply): Patient trending condition  Discharge Plan A: Long-term acute care facility (LTAC)            Critical care time spent on the evaluation and treatment of severe organ dysfunction, review of pertinent labs and imaging studies, discussions with consulting providers and discussions with patient/family: 32 minutes.      Geeta Siddiqi MD  Department of Hospital Medicine   [facility]

## 2022-01-14 NOTE — CARE UPDATE
01/14/22 0803   Patient Assessment/Suction   Respiratory Effort Normal   Expansion/Accessory Muscles/Retractions expansion symmetric   All Lung Fields Breath Sounds Anterior:   LLL Breath Sounds diminished   RLL Breath Sounds diminished   Rhythm/Pattern, Respiratory assisted mechanically   Cough Frequency with stimulation   Cough Type fair;productive   Suction Method tracheal   Suction Pressure (mmHg) 100 mmHg   $ Suction Charges Inline Suction Procedure Stat Charge   Secretions Amount small   Secretions Color cloudy;white   Secretions Characteristics thick   PRE-TX-O2   O2 Device (Oxygen Therapy) ventilator   $ Is the patient on Low Flow Oxygen? Yes   Oxygen Concentration (%) 50   SpO2 99 %   Pulse Oximetry Type Continuous   $ Pulse Oximetry - Multiple Charge Pulse Oximetry - Multiple   Pulse 88   Resp (!) 24   Temp 99.5 °F (37.5 °C)   Positioning HOB elevated 30 degrees   ETCO2   $ ETCO2 Usage Currently wearing   ETCO2 (mmHg) 26 mmHg   ETCO2 Device Type Bedside Monitor   Aerosol Therapy   $ Aerosol Therapy Charges Aerosol Treatment   Respiratory Treatment Status (SVN) given   Treatment Route (SVN) in-line   Patient Position (SVN) HOB elevated   Signs of Intolerance (SVN) none   Breath Sounds Post-Respiratory Treatment   Throughout All Fields Post-Treatment All Fields   Throughout All Fields Post-Treatment no change   Skin Integrity   $ Wound Care Tech Time 15 min   Area Observed Neck under tracheostomy   Skin Appearance without discoloration        Surgical Airway Portex Cuffed   No placement date or time found.   Present Prior to Hospital Arrival?: Yes  Type: Tracheostomy  Brand: Portex  Airway Device Size: 8.0  Style: Cuffed   Cuff Pressure 30 cm H2O   Cuff Inflation? Inflated   Status Secured   Site Assessment Dry;Clean   Site Care Protective barrier to skin   Ties Assessment Secure;Intact   General Safety Checklist   Safety Promotion/Fall Prevention side rails raised   Airway Safety   Ambu bag with the  patient? Yes, Adult Ambu   Is mask with the patient? Yes, Adult Mask   Suction set is at the bedside? Yes   Extra trach at bedside? Yes   Vent Select   Conventional Vent Y   $ Ventilator Subsequent 1   Charged w/in last 24h YES   Preset Conventional Ventilator Settings   Vent Type NKV-550   Ventilation Type VC   Vent Mode A/CMV-VC   Humidity HME   Set Rate 24 BPM   Vt Set 350 mL   PEEP/CPAP 6.1 cmH20   Insp Time 0.8 Sec(s)   I-Trigger Type  Flow   Trigger Sensitivity Flow/I-Trigger 2.5 L/min   Patient Ventilator Parameters   Resp Rate Total 24 br/min   Peak Airway Pressure 38.4 cmH2O   Exhaled Vt 349 mL   Total Ve 8.13 mL   I:E Ratio Measured 1:2.1   Tube Type Trach   Inspired Tidal Volume (VTI) 349 mL   Conventional Ventilator Alarms   Alarms On Y   Apnea Rate 15   Apnea Volume (mL) 450 mL   Ready to Wean/Extubation Screen   FIO2<=50 (chart decimal) 0.5   MV<16L (chart vol.) 8.13   PEEP <=8 (chart #) 6.1   Ready to Wean Parameters   F/VT Ratio<105 (RSBI) (!) 68.77   POx, nebs Q4, trach care Q12

## 2022-01-14 NOTE — SUBJECTIVE & OBJECTIVE
Interval History:  Status post percutaneous cholecystostomy drain placed today.  Sputum culture grew Pseudomonas, Klebsiella pneumoniae (ESBL species) and Serratia species. Blood culture with CoNS- contaminant.  Infectious Disease started intravenous Avycaz. Urine with Proteus species.  Peg tube feeding at goal.  Significant gastric residual noted.  Pulmonary secretions suctioning gastric contents.  Tube feed is being held.  Patient underwent percutaneous cholecystostomy drain placement by Interventional Radiology on January 13, 2022. It is draining purulent discharge.    Review of Systems   Unable to perform ROS: Patient nonverbal     Objective:     Vital Signs (Most Recent):  Temp: 99.5 °F (37.5 °C) (01/14/22 0803)  Pulse: 84 (01/14/22 0806)  Resp: (!) 24 (01/14/22 0806)  BP: 102/61 (01/14/22 0600)  SpO2: 100 % (01/14/22 0806) Vital Signs (24h Range):  Temp:  [95.72 °F (35.4 °C)-99.9 °F (37.7 °C)] 99.5 °F (37.5 °C)  Pulse:  [] 84  Resp:  [22-55] 24  SpO2:  [96 %-100 %] 100 %  BP: ()/(53-98) 102/61     Weight: 73.1 kg (161 lb 2.5 oz)  Body mass index is 31.47 kg/m².    Intake/Output Summary (Last 24 hours) at 1/14/2022 0931  Last data filed at 1/14/2022 0846  Gross per 24 hour   Intake 2531.7 ml   Output 3500 ml   Net -968.3 ml      Physical Exam  Vitals and nursing note reviewed.   Constitutional:       General: She is not in acute distress.     Appearance: She is well-developed. She is ill-appearing.      Comments: Chronic trach/vent dependent   HENT:      Head: Normocephalic and atraumatic.      Mouth/Throat:      Mouth: Mucous membranes are dry.   Cardiovascular:      Rate and Rhythm: Normal rate and regular rhythm.   Pulmonary:      Effort: Pulmonary effort is normal. No respiratory distress.      Breath sounds: Rhonchi present.   Abdominal:      General: Bowel sounds are normal.      Palpations: Abdomen is soft.      Tenderness: There is no abdominal tenderness.      Comments: g tube in place    Musculoskeletal:      Cervical back: Normal range of motion and neck supple.      Comments: Contractures to hands   Skin:     General: Skin is warm and dry.   Neurological:      GCS: GCS eye subscore is 1. GCS verbal subscore is 1. GCS motor subscore is 1.   Psychiatric:      Comments: Unable to assess         Significant Labs:  Lab Results   Component Value Date    WBC 5.03 01/14/2022    HGB 7.7 (L) 01/14/2022    HCT 24.5 (L) 01/14/2022    MCV 87 01/14/2022     01/14/2022       CMP  Sodium   Date Value Ref Range Status   01/14/2022 132 (L) 136 - 145 mmol/L Final     Potassium   Date Value Ref Range Status   01/14/2022 3.9 3.5 - 5.1 mmol/L Final     Chloride   Date Value Ref Range Status   01/14/2022 95 95 - 110 mmol/L Final     CO2   Date Value Ref Range Status   01/14/2022 27 23 - 29 mmol/L Final     Glucose   Date Value Ref Range Status   01/14/2022 94 70 - 110 mg/dL Final     BUN   Date Value Ref Range Status   01/14/2022 5 (L) 6 - 20 mg/dL Final     Creatinine   Date Value Ref Range Status   01/14/2022 0.4 (L) 0.5 - 1.4 mg/dL Final     Calcium   Date Value Ref Range Status   01/14/2022 8.5 (L) 8.7 - 10.5 mg/dL Final     Total Protein   Date Value Ref Range Status   01/14/2022 6.2 6.0 - 8.4 g/dL Final     Albumin   Date Value Ref Range Status   01/14/2022 1.9 (L) 3.5 - 5.2 g/dL Final     Total Bilirubin   Date Value Ref Range Status   01/14/2022 0.4 0.1 - 1.0 mg/dL Final     Comment:     For infants and newborns, interpretation of results should be based  on gestational age, weight and in agreement with clinical  observations.    Premature Infant recommended reference ranges:  Up to 24 hours.............<8.0 mg/dL  Up to 48 hours............<12.0 mg/dL  3-5 days..................<15.0 mg/dL  6-29 days.................<15.0 mg/dL       Alkaline Phosphatase   Date Value Ref Range Status   01/14/2022 137 (H) 55 - 135 U/L Final     AST   Date Value Ref Range Status   01/14/2022 13 10 - 40 U/L Final     ALT    Date Value Ref Range Status   01/14/2022 16 10 - 44 U/L Final     Anion Gap   Date Value Ref Range Status   01/14/2022 10 8 - 16 mmol/L Final     eGFR if    Date Value Ref Range Status   01/14/2022 >60 >60 mL/min/1.73 m^2 Final     eGFR if non    Date Value Ref Range Status   01/14/2022 >60 >60 mL/min/1.73 m^2 Final     Comment:     Calculation used to obtain the estimated glomerular filtration  rate (eGFR) is the CKD-EPI equation.        Microbiology Results (last 7 days)     Procedure Component Value Units Date/Time    Blood culture [[id number]] Collected: 01/12/22 1627    Order Status: Completed Specimen: Blood from Peripheral, Right Hand Updated: 01/13/22 2312     Blood Culture, Routine No Growth to date      No Growth to date    Blood culture [[id number]] Collected: 01/12/22 1627    Order Status: Completed Specimen: Blood from Peripheral, Left Hand Updated: 01/13/22 2312     Blood Culture, Routine No Growth to date      No Growth to date    Aerobic culture [[id number]] Collected: 01/13/22 1120    Order Status: Sent Specimen: Body Fluid from Gallbladder Updated: 01/13/22 1753    Culture, Anaerobe [[id number]] Collected: 01/13/22 1120    Order Status: Sent Specimen: Body Fluid from Gallbladder Updated: 01/13/22 1751    Culture, Respiratory with Gram Stain [[id number]]  (Abnormal)  (Susceptibility) Collected: 01/05/22 2009    Order Status: Completed Specimen: Respiratory from Endotracheal Aspirate Updated: 01/13/22 1114     Respiratory Culture No S aureus isolated.      SERRATIA MARCESCENS  Many        KLEBSIELLA PNEUMONIAE ESBL  Moderate        PSEUDOMONAS AERUGINOSA   Moderate       Gram Stain (Respiratory) <10 epithelial cells per low power field.     Gram Stain (Respiratory) Rare WBC's     Gram Stain (Respiratory) Few Gram negative rods     Gram Stain (Respiratory) Rare yeast    Blood culture x two cultures. Draw prior to antibiotics. [[id number]] Collected: 01/03/22 1950     Order Status: Completed Specimen: Blood Updated: 01/09/22 0612     Blood Culture, Routine No growth after 5 days.    Narrative:      Aerobic and anaerobic    Urine culture [[id number]]  (Abnormal)  (Susceptibility) Collected: 01/03/22 1943    Order Status: Completed Specimen: Urine Updated: 01/07/22 1553     Urine Culture, Routine PROTEUS MIRABILIS  >100,000 cfu/ml      Narrative:      Specimen Source->Urine          Significant Imaging:  CXR:  Worsening airspace opacities in bilateral lungs, suspicious for multifocal pneumonia.    CXR: Resolving intrapulmonary infiltrates with a small amount of consolidation remaining at the right lung base. Tracheostomy tube in position.    KUB: No acute abdominal process.    CXR: Resolving intrapulmonary infiltrates with a small amount of consolidation remaining at the right lung base. Tracheostomy tube in position.    CXR: Right lung infiltrate without significant change.    KUB:  No acute abdominal process.    CT abdomen and pelvis with contrast:  Features of acute cholecystitis.  Two stones appear present within the cystic duct.  No common duct stones identified. Small bilateral pleural effusions and patchy bibasilar airspace disease compatible with pneumonia.

## 2022-01-14 NOTE — PLAN OF CARE
Pt's TF held due to vomitting, restarted at rate of 10 with increase q 6 hr by 10ml. Rectal tube inserted due to liquid stools. Biliary drain patent with bile colored drainage. Continue with current POC, safety measures active, will continue to monitor.    Problem: Communication Impairment (Mechanical Ventilation, Invasive)  Goal: Effective Communication  Outcome: Ongoing, Progressing     Problem: Device-Related Complication Risk (Mechanical Ventilation, Invasive)  Goal: Optimal Device Function  Outcome: Ongoing, Progressing     Problem: Inability to Wean (Mechanical Ventilation, Invasive)  Goal: Mechanical Ventilation Liberation  Outcome: Ongoing, Progressing     Problem: Nutrition Impairment (Mechanical Ventilation, Invasive)  Goal: Optimal Nutrition Delivery  Outcome: Ongoing, Progressing     Problem: Skin and Tissue Injury (Mechanical Ventilation, Invasive)  Goal: Absence of Device-Related Skin and Tissue Injury  Outcome: Ongoing, Progressing     Problem: Ventilator-Induced Lung Injury (Mechanical Ventilation, Invasive)  Goal: Absence of Ventilator-Induced Lung Injury  Outcome: Ongoing, Progressing     Problem: Communication Impairment (Artificial Airway)  Goal: Effective Communication  Outcome: Ongoing, Progressing     Problem: Device-Related Complication Risk (Artificial Airway)  Goal: Optimal Device Function  Outcome: Ongoing, Progressing     Problem: Skin and Tissue Injury (Artificial Airway)  Goal: Absence of Device-Related Skin or Tissue Injury  Outcome: Ongoing, Progressing     Problem: Noninvasive Ventilation Acute  Goal: Effective Unassisted Ventilation and Oxygenation  Outcome: Ongoing, Progressing     Problem: Adult Inpatient Plan of Care  Goal: Plan of Care Review  Outcome: Ongoing, Progressing  Goal: Patient-Specific Goal (Individualized)  Outcome: Ongoing, Progressing  Goal: Absence of Hospital-Acquired Illness or Injury  Outcome: Ongoing, Progressing  Goal: Optimal Comfort and Wellbeing  Outcome:  Ongoing, Progressing  Goal: Readiness for Transition of Care  Outcome: Ongoing, Progressing     Problem: Adjustment to Illness (Delirium)  Goal: Optimal Coping  Outcome: Ongoing, Progressing     Problem: Altered Behavior (Delirium)  Goal: Improved Behavioral Control  Outcome: Ongoing, Progressing     Problem: Attention and Thought Clarity Impairment (Delirium)  Goal: Improved Attention and Thought Clarity  Outcome: Ongoing, Progressing     Problem: Sleep Disturbance (Delirium)  Goal: Improved Sleep  Outcome: Ongoing, Progressing     Problem: Adjustment to Illness (Sepsis/Septic Shock)  Goal: Optimal Coping  Outcome: Ongoing, Progressing     Problem: Bleeding (Sepsis/Septic Shock)  Goal: Absence of Bleeding  Outcome: Ongoing, Progressing     Problem: Glycemic Control Impaired (Sepsis/Septic Shock)  Goal: Blood Glucose Level Within Desired Range  Outcome: Ongoing, Progressing     Problem: Infection Progression (Sepsis/Septic Shock)  Goal: Absence of Infection Signs and Symptoms  Outcome: Ongoing, Progressing     Problem: Nutrition Impaired (Sepsis/Septic Shock)  Goal: Optimal Nutrition Intake  Outcome: Ongoing, Progressing     Problem: Impaired Wound Healing  Goal: Optimal Wound Healing  Outcome: Ongoing, Progressing     Problem: Fall Injury Risk  Goal: Absence of Fall and Fall-Related Injury  Outcome: Ongoing, Progressing     Problem: Skin Injury Risk Increased  Goal: Skin Health and Integrity  Outcome: Ongoing, Progressing     Problem: Infection  Goal: Absence of Infection Signs and Symptoms  Outcome: Ongoing, Progressing     Problem: Aspiration (Enteral Nutrition)  Goal: Absence of Aspiration Signs and Symptoms  Outcome: Ongoing, Progressing     Problem: Device-Related Complication Risk (Enteral Nutrition)  Goal: Safe, Effective Therapy Delivery  Outcome: Ongoing, Progressing     Problem: Feeding Intolerance (Enteral Nutrition)  Goal: Feeding Tolerance  Outcome: Ongoing, Progressing

## 2022-01-14 NOTE — PLAN OF CARE
POC reviewed with patient. No evidence of understanding. Trach to vent per ordered settings - FIO2 weaned to 40% today. Peg feedings increased up to 30 ml/hr. Biliary drain to gravity with brownish/orange output. Hamilton to gravity. Flexiseal with loose brown stool. Safety measures in place. Bed alarm in use.     Problem: Communication Impairment (Mechanical Ventilation, Invasive)  Goal: Effective Communication  1/14/2022 1701 by Tramaine Carrasco RN  Outcome: Ongoing, Progressing     Problem: Inability to Wean (Mechanical Ventilation, Invasive)  Goal: Mechanical Ventilation Liberation  1/14/2022 1701 by Tramaine Carrasco, JASBIR  Outcome: Ongoing, Progressing     Problem: Nutrition Impairment (Mechanical Ventilation, Invasive)  Goal: Optimal Nutrition Delivery  1/14/2022 1701 by Tramaine Carrasco RN  Outcome: Ongoing, Progressing     Problem: Communication Impairment (Artificial Airway)  Goal: Effective Communication  1/14/2022 1413 by Tramaine Carrasco, JASBIR  Outcome: Ongoing, Progressing     Problem: Adult Inpatient Plan of Care  Goal: Optimal Comfort and Wellbeing  1/14/2022 1701 by Tramaine Carrasco RN  Outcome: Ongoing, Progressing

## 2022-01-14 NOTE — PHYSICIAN QUERY
PT Name: Genna Felix  MR #: [id number]     DOCUMENTATION CLARIFICATION     CDS/: Cyrus ZAZUETA RN-BC  Contact information: [email or web address]  This form is a permanent document in the medical record.     Query Date: January 14, 2022    By submitting this query, we are merely seeking further clarification of documentation.  Please utilize your independent clinical judgment when addressing the question(s) below.    The Medical Record contains the following:   Indicators   Supporting Clinical Findings Location in Medical Record    Non-blanchable erythema/redness     X Ulcer/Injury/Skin Breakdown Pressure injury of left buttock, stage 4    Decubitus ulcer-logroll Q 2. Wound care consulted Hospital Medicine, H&P 1/3     eICU Pulmonary 1/4    Deep Tissue Injury     X Wound care consult Patient with shearing noted to her right elbow with open areas to the level of a stage 2 within this shearing area.    Perirectal area has incontinent associated dermatitis which is minimal.    Wound Care 1/4    X Acute/Chronic Illness Anoxic brain injury, G tube, tracheostomy, PE, HTN, GERD, Ventilator associated pneumonia, UTI, Sepsis, Pressure injury left buttock stage 4, chronic respiratory failure with hypoxia, anemia of chronic disease Hospital Medicine, H&P 1/3   X Medication/Treatment Consult wound care  Turn q2h    Calmoseptine ointment to buttocks every shift and leave open to air. Notify wound care if needed    Hospital Medicine, H&P 1/3       Wound Care 1/4    Other       The clinical guidelines noted are only a system guideline. It does not replace the providers clinical judgment.    Per the National Pressure Injury Advisory Panel:   A pressure injury is localized damage to the skin and underlying soft tissue usually over a bony prominence or related to a medical or other device. The injury can present as intact skin or an open ulcer and may be painful. The injury occurs as a result of intense and/or prolonged pressure or  pressure in combination with shear. The tolerance of soft tissue for pressure and shear may also be affected by microclimate, nutrition, perfusion, co-morbidities and condition of the soft tissue.     Stage 1 Pressure Injury:  Intact skin with a localized area of non-blanchable erythema, which may appear differently in darkly pigmented skin. Color changes do not include purple or maroon discoloration; these may indicate deep tissue pressure injury.    Stage 2 Pressure Injury:  Partial-thickness loss of skin with exposed dermis. The wound bed is viable, pink or red, moist, and may also present as an intact or ruptured serum-filled blister.    Stage 3 Pressure Injury:  Full-thickness loss of skin, in which adipose (fat) is visible in the ulcer and granulation tissue and epibole (rolled wound edges) are often present. Slough and/or eschar may be visible. Undermining and tunneling may occur.    Stage 4 Pressure Injury:  Full-thickness skin and tissue loss with exposed or directly palpable fascia, muscle, tendon, ligament, cartilage or bone in the ulcer. Slough and/or eschar may be visible. Epibole (rolled edges), undermining and/or tunneling often occur.    Unstageable Pressure Injury:  Full-thickness skin and tissue loss in which the extent of tissue damage within the ulcer cannot be confirmed because it is obscured by slough or eschar. If slough or eschar is removed, a Stage 3 or Stage 4 pressure injury will be revealed.      Provider, please provide the integumentary diagnosis related to the documentation of Altered Skin Integrity/Wound     #1 Right Elbow  [  x ] Pressure Injury/Decubitus Ulcer, Stage 2   [   ] Other Integumentary Diagnosis (please specify):______________   [  ] Clinically Undetermined     #2 Left Buttock  [  x ] Pressure Injury/Decubitus Ulcer, Stage 4   [   ] Moisture associated dermatitis   [   ] Other Integumentary Diagnosis (please specify):______________   [  ] Clinically Undetermined      Please document in your progress notes daily for the duration of treatment until resolved and include in your discharge summary.    Reference:    COLLIN Badillo., ERNESTO Senior., Goldberg, M., COLLIN Pantoja, COLLIN Ramos, & AME Hernandez. (2016). Revised National Pressure Ulcer Advisory Panel Pressure Injury Staging System: Revised Pressure Injury Staging System. J Wound Ostomy Continence Nurs, 43(6), 585-597. doi:10.1097/won.5463253910803716    Form No.[id number]

## 2022-01-14 NOTE — PHYSICIAN QUERY
PT Name: Genna Felix  MR #: [id number]    DOCUMENTATION CLARIFICATION     CDS/: Cyrus ZAZUETA, RN-BC  Contact information: [email or web address]    This form is a permanent document in the medical record.     Query Date: January 14, 2022    By submitting this query, we are merely seeking further clarification of documentation.. Please utilize your independent clinical judgment when addressing the question(s) below.    The medical record contains the following:   Indicators  Supporting Clinical Findings Location in Medical Record    % of Estimated Energy Intake over a time frame from p.o., TF, or TPN      Weight Status over a time frame     X Subcutaneous Fat and/or Muscle Loss Subcutaneous Fat Loss (Final Summary): well nourished  Muscle Loss Evaluation (Final Summary): well nourished   Nutrition 1/4   X Fluid Accumulation or Edema Edema 2-3+ Nutrition 1/10   X Wt/BMI/Usual Body Weight Wt 144lb(65.7 kg)/BMI 28/ UBW 66.1 kg Nutrition 1/4   X Delayed Wound Healing/Failure to Thrive Pressure injury left buttock stage 4 Hospital Medicine, H&P 1/3   X Acute or Chronic Illness Anoxic brain injury, G tube, tracheostomy, PE, HTN, GERD, Ventilator associated pneumonia, UTI, Sepsis, Pressure injury left buttock stage 4, chronic respiratory failure with hypoxia, anemia of chronic disease       Hospital Medicine, H&P 1/3    Medication     X Treatment Nutrition consult        Start TF as soon as medically able - same as TF PTA  Isosource 1.5 @ 20 ml/hr advancing to 50 ml/hr + 110 ml flush q 4 hr + beneprotein BID +Benny BID  (provides 1800 kcal (> 100% EEN), 81 g protein + bene (94% EPN), 912 ml free water)  weigh weekly.  TF advanced in < 4 days       Hospital Medicine, H&P 1/3        RD 1/4   X Other CT scan abdomen/pelvis revealed findings c/w acute cholecystitis with 2 stones in the cystic duct.  HIDA scan suggestive of cystic duct obstruction.   Plan for IR guided percutaneous drainage  1/13:  status post IR guided per  cholecystostomy.  Large amount of purulent bilious fluid drained, drain in place, fluid sent for cultures, in process Infectious Disease, 1/13     AND / ASPEN Clinical Characteristics (October 2011)  A minimum of two characteristics is recommended for diagnosing either moderate or severe malnutrition   Mild Malnutrition Moderate Malnutrition Severe Malnutrition   Energy Intake from p.o., TF or TPN. < 75% intake of estimated energy needs for less than 7 days < 75% intake of estimated energy needs for greater than 7 days < 50% intake of estimated energy needs for > 5 days   Weight Loss 1-2% in 1 month  5% in 3 months  7.5% in 6 months  10% in 1 year 1-2 % in 1 week  5% in 1 month  7.5% in 3 months  10% in 6 months  20% in 1 year > 2% in 1 week  > 5% in 1 month  > 7.5% in 3 months  > 10% in 6 months  > 20% in 1 year   Physical Findings     None *Mild subcutaneous fat and/or muscle loss  *Mild fluid accumulation  *Stage II decubitus  *Surgical wound or non-healing wound *Mod/severe subcutaneous fat and/or muscle loss  *Mod/severe fluid accumulation  *Stage III or IV decubitus  *Non-healing surgical wound     Provider, please specify diagnosis or diagnoses associated with above clinical findings.    [  ] Mild Protein-Calorie Malnutrition   [ x ] Moderate Protein-Calorie Malnutrition   [  ] Other Nutritional Diagnosis (please specify): _______   [  ] Malnutrition ruled out   [  ] Clinically Undetermined     Please document in your progress notes daily for the duration of treatment until resolved and include in your discharge summary.

## 2022-01-14 NOTE — PHYSICIAN QUERY
PT Name: Genna Felix  MR #: [id number]     DOCUMENTATION CLARIFICATION      CDS/: Cyrus ZAZUETA, RN-BC  Contact information: [email or web address]    This form is a permanent document in the medical record.     Query Date: January 14, 2022    By submitting this query, we are merely seeking further clarification of documentation to reflect the severity of illness of your patient. Please utilize your independent clinical judgment when addressing the question(s) below.    The Medical Record contains the following:   Indicators   Supporting Clinical Findings Location in Medical Record   X Documentation of condition Evidence of UTI    Sepsis multifactorial; right pneumonia/UTI/acute cholecystitis Hospital Medicine, H&P, 1/3     ID 1/12   X Urinary Device, Catheter indwelling [place] ED Provider Note 1/3   X Lab Value(s) WBC 14.01  Lactate   2.6, 2.4  Procal    0.57  CRP      123.8   1/3 Labs  1/3  1/12  1/12     X UA Results Clear, negative nitrites, 3+ leukocytes, 24 WBC, many bacteria UA 1/3   X Cultures Proteus Mirabilis > 100,000 UCx 1/3   X Treatment/Medication IV Zosyn; follow culture Hospital Medicine, H&P, 1/3    Other       Provider, please clarify if there is any clinical correlation between UTI and Hamilton.  [ x  ] Due to or associated with each other   [   ] Unrelated to each other   [   ] Other explanation (please specify): _____________   [   ] Clinically undetermined     Please document in your progress notes daily for the duration of treatment until resolved, and include in your discharge summary.    Form No. [id number]

## 2022-01-14 NOTE — PROGRESS NOTES
Progress Note  PULMONARY    Admit Date: 1/3/2022   01/14/2022  From Dr Montesinos's consult:  History of Present Illness:  Pt is a 60 yo female with anoxic brain injury, chronic respiratory failure with vent dependence, PEG, cirrhosis, GERD, HTN, seizure disorder, resident of [facility] who presented to the ED for evaluation of tachycardia. Pt is admitted to the ICU with sepsis and pneumonia. She is requiring precedex drip for agitation/tachypnea. She is receiving fluid resuscitation and vanco/zosyn for pneumonia.    SUBJECTIVE:     1/5- no new issues  1/6- no new issues  1/7- remains on vent, GNR on sputum culture, CXR better. Per RN multiple episodes emesis so tube feeds are held  1/8- no new issues  1/9- no new issues  1/10- no new issues  1/11- yesterday had emesis, aspiration event. abg looks ok. Remains on vent  1/12- no new issues. Per nurse there is plan for cholecystectomy?  1/13- remains on vent, afebrile, vs stable. S/p IR PCT this am  Above from Dr Montesinos and below from Dr London:  1/14 no c/o on vent      OBJECTIVE:     Vitals (Most recent):  Vitals:    01/14/22 1100   BP:    Pulse: 80   Resp: (!) 24   Temp: 98.24 °F (36.8 °C)       Vitals (24 hour range):  Temp:  [95.72 °F (35.4 °C)-99.9 °F (37.7 °C)]   Pulse:  []   Resp:  [22-42]   BP: ()/(52-81)   SpO2:  [96 %-100 %]       Intake/Output Summary (Last 24 hours) at 1/14/2022 1115  Last data filed at 1/14/2022 0846  Gross per 24 hour   Intake 2641.7 ml   Output 3500 ml   Net -858.3 ml          Physical Exam:  The patient's neuro status (alertness,orientation,cognitive function,motor skills,), pharyngeal exam (oral lesions, hygiene, abn dentition,), Neck (jvd,mass,thyroid,nodes in neck and above/below clavicle),RESPIRATORY(symmetry,effort,fremitus,percussion,auscultation),  Cor(rhythm,heart tones including gallops,perfusion,edema)ABD(distention,hepatic&splenomegaly,tenderness,masses), Skin(rash,cyanosis),Psyc(affect,judgement,).  Exam  negative except for these pertinent findings:    Unresponsive,   Trach w vent  Bilateral coarse breath sounds  Abdomen soft, distended, hypoactive bowel sounds, PEG in place  Percutaneous ángel drain in place, thick yellow material in bag  Contracted upper ext           Radiographs reviewed: view by direct vision   HIDA scan 1/11-   1. Findings are suggestive of cystic duct obstruction.  2. Common bile duct is patent.  CXR 1/9- worsened appearance R mid/lower lung infiltrates  CXR 1/7- improved infiltrates  CXR 1/3/22- bilateral lower lobe infiltrates, worsened compared to previous film    Labs     Recent Labs   Lab 01/14/22  0339   WBC 5.03   HGB 7.7*   HCT 24.5*        Recent Labs   Lab 01/14/22  0339   *   K 3.9   CL 95   CO2 27   BUN 5*   CREATININE 0.4*   GLU 94   CALCIUM 8.5*   MG 1.9   PHOS 3.0   AST 13   ALT 16   ALKPHOS 137*   BILITOT 0.4   PROT 6.2   ALBUMIN 1.9*   CRP 62.9*     No results for input(s): PH, PCO2, PO2, HCO3 in the last 24 hours.  Microbiology Results (last 7 days)     Procedure Component Value Units Date/Time    Blood culture [[id number]] Collected: 01/12/22 1627    Order Status: Completed Specimen: Blood from Peripheral, Right Hand Updated: 01/13/22 2312     Blood Culture, Routine No Growth to date      No Growth to date    Blood culture [[id number]] Collected: 01/12/22 1627    Order Status: Completed Specimen: Blood from Peripheral, Left Hand Updated: 01/13/22 2312     Blood Culture, Routine No Growth to date      No Growth to date    Aerobic culture [[id number]] Collected: 01/13/22 1120    Order Status: Sent Specimen: Body Fluid from Gallbladder Updated: 01/13/22 1753    Culture, Anaerobe [[id number]] Collected: 01/13/22 1120    Order Status: Sent Specimen: Body Fluid from Gallbladder Updated: 01/13/22 1751    Culture, Respiratory with Gram Stain [[id number]]  (Abnormal)  (Susceptibility) Collected: 01/05/22 2009    Order Status: Completed Specimen: Respiratory from Endotracheal  Aspirate Updated: 01/13/22 1114     Respiratory Culture No S aureus isolated.      SERRATIA MARCESCENS  Many        KLEBSIELLA PNEUMONIAE ESBL  Moderate        PSEUDOMONAS AERUGINOSA   Moderate       Gram Stain (Respiratory) <10 epithelial cells per low power field.     Gram Stain (Respiratory) Rare WBC's     Gram Stain (Respiratory) Few Gram negative rods     Gram Stain (Respiratory) Rare yeast    Blood culture x two cultures. Draw prior to antibiotics. [[id number]] Collected: 01/03/22 1950    Order Status: Completed Specimen: Blood Updated: 01/09/22 0612     Blood Culture, Routine No growth after 5 days.    Narrative:      Aerobic and anaerobic    Urine culture [[id number]]  (Abnormal)  (Susceptibility) Collected: 01/03/22 1943    Order Status: Completed Specimen: Urine Updated: 01/07/22 1553     Urine Culture, Routine PROTEUS MIRABILIS  >100,000 cfu/ml      Narrative:      Specimen Source->Urine          Impression:  Active Hospital Problems    Diagnosis  POA    *Sepsis [A41.9]  Yes    Acute cholecystitis [K81.0]  No    Hyponatremia [E87.1]  Yes    UTI (urinary tract infection) [N39.0]  Yes    Ventilator associated pneumonia [J95.851]  Yes    Essential hypertension [I10]  Yes    Gastroesophageal reflux disease [K21.9]  Yes    Malnutrition of moderate degree [E44.0]  Yes    Chronic respiratory failure with hypoxia [J96.11]  Yes    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  Added automatically from request for surgery [id number]        Resolved Hospital Problems   No resolved problems to display.               Plan:     - continue vent, no weaning  - antibiotics per ID  - lovenox subq for DVT prophylaxis  - pepcid for GI  prophylaxis  - wound care   - continue midodrine  - monitor Hgb  - tube feeds restart when appropriate  - surgery following - s/p IR PCT    Above from Dr Montesinos and below from Dr London:  1/14 no fever, vss, tm 99.1, 50% ox,   avycaz after zosyn/merrem/vanc-- abx since 1/3  crp and procal 124 and 0.6 on 12th,     Serratia/ esbl kleb/ mdr pseudomonas from culture ojn 5 th sputum    cxr 1/7 to 10th with vague sm new densities right mid lung.      Infection seems reasonable controlled    Needing abx per id, resp status stable,  Would try to keep ox needs <41%.  Dr Montesinos back Monday, call pulm in needed.                .

## 2022-01-15 ENCOUNTER — OUTSIDE PLACE OF SERVICE (OUTPATIENT)
Dept: INFECTIOUS DISEASES | Facility: CLINIC | Age: 62
End: 2022-01-15
Payer: MEDICARE

## 2022-01-15 DIAGNOSIS — R65.20 SEVERE SEPSIS: ICD-10-CM

## 2022-01-15 DIAGNOSIS — A41.9 SEVERE SEPSIS: ICD-10-CM

## 2022-01-15 LAB
ALBUMIN SERPL BCP-MCNC: 1.9 G/DL (ref 3.5–5.2)
ALP SERPL-CCNC: 151 U/L (ref 55–135)
ALT SERPL W/O P-5'-P-CCNC: 13 U/L (ref 10–44)
ANION GAP SERPL CALC-SCNC: 8 MMOL/L (ref 8–16)
AST SERPL-CCNC: 11 U/L (ref 10–40)
BACTERIA SPEC AEROBE CULT: ABNORMAL
BASOPHILS # BLD AUTO: 0.01 K/UL (ref 0–0.2)
BASOPHILS NFR BLD: 0.2 % (ref 0–1.9)
BILIRUB SERPL-MCNC: 0.4 MG/DL (ref 0.1–1)
BUN SERPL-MCNC: 5 MG/DL (ref 6–20)
CALCIUM SERPL-MCNC: 8.6 MG/DL (ref 8.7–10.5)
CHLORIDE SERPL-SCNC: 97 MMOL/L (ref 95–110)
CO2 SERPL-SCNC: 26 MMOL/L (ref 23–29)
CREAT SERPL-MCNC: 0.4 MG/DL (ref 0.5–1.4)
DIFFERENTIAL METHOD: ABNORMAL
EOSINOPHIL # BLD AUTO: 0.3 K/UL (ref 0–0.5)
EOSINOPHIL NFR BLD: 5.3 % (ref 0–8)
ERYTHROCYTE [DISTWIDTH] IN BLOOD BY AUTOMATED COUNT: 17.3 % (ref 11.5–14.5)
EST. GFR  (AFRICAN AMERICAN): >60 ML/MIN/1.73 M^2
EST. GFR  (NON AFRICAN AMERICAN): >60 ML/MIN/1.73 M^2
GLUCOSE SERPL-MCNC: 113 MG/DL (ref 70–110)
GRAM STN SPEC: ABNORMAL
HCT VFR BLD AUTO: 29.4 % (ref 37–48.5)
HGB BLD-MCNC: 9.1 G/DL (ref 12–16)
IMM GRANULOCYTES # BLD AUTO: 0.02 K/UL (ref 0–0.04)
IMM GRANULOCYTES NFR BLD AUTO: 0.4 % (ref 0–0.5)
LYMPHOCYTES # BLD AUTO: 1.1 K/UL (ref 1–4.8)
LYMPHOCYTES NFR BLD: 19.2 % (ref 18–48)
MAGNESIUM SERPL-MCNC: 1.5 MG/DL (ref 1.6–2.6)
MAGNESIUM SERPL-MCNC: 2 MG/DL (ref 1.6–2.6)
MCH RBC QN AUTO: 27.4 PG (ref 27–31)
MCHC RBC AUTO-ENTMCNC: 31 G/DL (ref 32–36)
MCV RBC AUTO: 89 FL (ref 82–98)
MONOCYTES # BLD AUTO: 0.6 K/UL (ref 0.3–1)
MONOCYTES NFR BLD: 10.3 % (ref 4–15)
NEUTROPHILS # BLD AUTO: 3.6 K/UL (ref 1.8–7.7)
NEUTROPHILS NFR BLD: 64.6 % (ref 38–73)
NRBC BLD-RTO: 0 /100 WBC
PHOSPHATE SERPL-MCNC: 2.8 MG/DL (ref 2.7–4.5)
PLATELET # BLD AUTO: 481 K/UL (ref 150–450)
PLATELET BLD QL SMEAR: ABNORMAL
PMV BLD AUTO: 9.6 FL (ref 9.2–12.9)
POCT GLUCOSE: 114 MG/DL (ref 70–110)
POCT GLUCOSE: 119 MG/DL (ref 70–110)
POCT GLUCOSE: 132 MG/DL (ref 70–110)
POCT GLUCOSE: 136 MG/DL (ref 70–110)
POTASSIUM SERPL-SCNC: 3.8 MMOL/L (ref 3.5–5.1)
POTASSIUM SERPL-SCNC: 4.8 MMOL/L (ref 3.5–5.1)
PROCALCITONIN SERPL IA-MCNC: 0.15 NG/ML
PROT SERPL-MCNC: 6.4 G/DL (ref 6–8.4)
RBC # BLD AUTO: 3.32 M/UL (ref 4–5.4)
SODIUM SERPL-SCNC: 131 MMOL/L (ref 136–145)
WBC # BLD AUTO: 5.62 K/UL (ref 3.9–12.7)

## 2022-01-15 PROCEDURE — 25000003 PHARM REV CODE 250: Performed by: INTERNAL MEDICINE

## 2022-01-15 PROCEDURE — 94640 AIRWAY INHALATION TREATMENT: CPT

## 2022-01-15 PROCEDURE — 99233 SBSQ HOSP IP/OBS HIGH 50: CPT | Mod: S$GLB,,, | Performed by: INTERNAL MEDICINE

## 2022-01-15 PROCEDURE — 99900035 HC TECH TIME PER 15 MIN (STAT)

## 2022-01-15 PROCEDURE — 25000003 PHARM REV CODE 250: Performed by: SURGERY

## 2022-01-15 PROCEDURE — 25000242 PHARM REV CODE 250 ALT 637 W/ HCPCS: Performed by: NURSE PRACTITIONER

## 2022-01-15 PROCEDURE — S5010 5% DEXTROSE AND 0.45% SALINE: HCPCS | Performed by: INTERNAL MEDICINE

## 2022-01-15 PROCEDURE — 63600175 PHARM REV CODE 636 W HCPCS: Performed by: STUDENT IN AN ORGANIZED HEALTH CARE EDUCATION/TRAINING PROGRAM

## 2022-01-15 PROCEDURE — 84145 PROCALCITONIN (PCT): CPT | Performed by: STUDENT IN AN ORGANIZED HEALTH CARE EDUCATION/TRAINING PROGRAM

## 2022-01-15 PROCEDURE — 20000000 HC ICU ROOM

## 2022-01-15 PROCEDURE — 36415 COLL VENOUS BLD VENIPUNCTURE: CPT | Performed by: STUDENT IN AN ORGANIZED HEALTH CARE EDUCATION/TRAINING PROGRAM

## 2022-01-15 PROCEDURE — 27000221 HC OXYGEN, UP TO 24 HOURS

## 2022-01-15 PROCEDURE — 99233 PR SUBSEQUENT HOSPITAL CARE,LEVL III: ICD-10-PCS | Mod: S$GLB,,, | Performed by: INTERNAL MEDICINE

## 2022-01-15 PROCEDURE — 25000003 PHARM REV CODE 250: Performed by: NURSE PRACTITIONER

## 2022-01-15 PROCEDURE — 84100 ASSAY OF PHOSPHORUS: CPT | Performed by: NURSE PRACTITIONER

## 2022-01-15 PROCEDURE — 36415 COLL VENOUS BLD VENIPUNCTURE: CPT | Performed by: INTERNAL MEDICINE

## 2022-01-15 PROCEDURE — 99900026 HC AIRWAY MAINTENANCE (STAT)

## 2022-01-15 PROCEDURE — 27000207 HC ISOLATION

## 2022-01-15 PROCEDURE — 80053 COMPREHEN METABOLIC PANEL: CPT | Performed by: NURSE PRACTITIONER

## 2022-01-15 PROCEDURE — 83735 ASSAY OF MAGNESIUM: CPT | Performed by: NURSE PRACTITIONER

## 2022-01-15 PROCEDURE — 94761 N-INVAS EAR/PLS OXIMETRY MLT: CPT

## 2022-01-15 PROCEDURE — 83735 ASSAY OF MAGNESIUM: CPT | Mod: 91 | Performed by: INTERNAL MEDICINE

## 2022-01-15 PROCEDURE — 84132 ASSAY OF SERUM POTASSIUM: CPT | Performed by: INTERNAL MEDICINE

## 2022-01-15 PROCEDURE — 85025 COMPLETE CBC W/AUTO DIFF WBC: CPT | Performed by: INTERNAL MEDICINE

## 2022-01-15 PROCEDURE — 25000003 PHARM REV CODE 250: Performed by: STUDENT IN AN ORGANIZED HEALTH CARE EDUCATION/TRAINING PROGRAM

## 2022-01-15 PROCEDURE — 63600175 PHARM REV CODE 636 W HCPCS: Performed by: NURSE PRACTITIONER

## 2022-01-15 PROCEDURE — 94003 VENT MGMT INPAT SUBQ DAY: CPT

## 2022-01-15 PROCEDURE — 63600175 PHARM REV CODE 636 W HCPCS: Performed by: HOSPITALIST

## 2022-01-15 RX ORDER — MIDODRINE HYDROCHLORIDE 5 MG/1
5 TABLET ORAL 3 TIMES DAILY
Status: DISCONTINUED | OUTPATIENT
Start: 2022-01-15 | End: 2022-01-21 | Stop reason: HOSPADM

## 2022-01-15 RX ADMIN — CHLORHEXIDINE GLUCONATE 0.12% ORAL RINSE 15 ML: 1.2 LIQUID ORAL at 08:01

## 2022-01-15 RX ADMIN — IPRATROPIUM BROMIDE AND ALBUTEROL SULFATE 3 ML: 2.5; .5 SOLUTION RESPIRATORY (INHALATION) at 03:01

## 2022-01-15 RX ADMIN — POTASSIUM BICARBONATE 50 MEQ: 977.5 TABLET, EFFERVESCENT ORAL at 06:01

## 2022-01-15 RX ADMIN — HYPROMELLOSE 2910 1 DROP: 5 SOLUTION OPHTHALMIC at 10:01

## 2022-01-15 RX ADMIN — MIDODRINE HYDROCHLORIDE 5 MG: 5 TABLET ORAL at 10:01

## 2022-01-15 RX ADMIN — IPRATROPIUM BROMIDE AND ALBUTEROL SULFATE 3 ML: 2.5; .5 SOLUTION RESPIRATORY (INHALATION) at 04:01

## 2022-01-15 RX ADMIN — CEFTAZIDIME, AVIBACTAM 2.5 G: 2; .5 POWDER, FOR SOLUTION INTRAVENOUS at 06:01

## 2022-01-15 RX ADMIN — CEFTAZIDIME, AVIBACTAM 2.5 G: 2; .5 POWDER, FOR SOLUTION INTRAVENOUS at 03:01

## 2022-01-15 RX ADMIN — CHLORHEXIDINE GLUCONATE 0.12% ORAL RINSE 15 ML: 1.2 LIQUID ORAL at 10:01

## 2022-01-15 RX ADMIN — HYPROMELLOSE 2910 1 DROP: 5 SOLUTION OPHTHALMIC at 03:01

## 2022-01-15 RX ADMIN — MIDODRINE HYDROCHLORIDE 10 MG: 5 TABLET ORAL at 08:01

## 2022-01-15 RX ADMIN — IPRATROPIUM BROMIDE AND ALBUTEROL SULFATE 3 ML: 2.5; .5 SOLUTION RESPIRATORY (INHALATION) at 11:01

## 2022-01-15 RX ADMIN — IPRATROPIUM BROMIDE AND ALBUTEROL SULFATE 3 ML: 2.5; .5 SOLUTION RESPIRATORY (INHALATION) at 12:01

## 2022-01-15 RX ADMIN — IPRATROPIUM BROMIDE AND ALBUTEROL SULFATE 3 ML: 2.5; .5 SOLUTION RESPIRATORY (INHALATION) at 06:01

## 2022-01-15 RX ADMIN — FAMOTIDINE 20 MG: 10 INJECTION INTRAVENOUS at 08:01

## 2022-01-15 RX ADMIN — ENOXAPARIN SODIUM 40 MG: 40 INJECTION SUBCUTANEOUS at 04:01

## 2022-01-15 RX ADMIN — DEXTROSE AND SODIUM CHLORIDE 100 ML/HR: 5; .45 INJECTION, SOLUTION INTRAVENOUS at 12:01

## 2022-01-15 RX ADMIN — FAMOTIDINE 20 MG: 10 INJECTION INTRAVENOUS at 10:01

## 2022-01-15 RX ADMIN — IPRATROPIUM BROMIDE AND ALBUTEROL SULFATE 3 ML: 2.5; .5 SOLUTION RESPIRATORY (INHALATION) at 07:01

## 2022-01-15 RX ADMIN — DEXTROSE AND SODIUM CHLORIDE: 5; .45 INJECTION, SOLUTION INTRAVENOUS at 10:01

## 2022-01-15 RX ADMIN — HYPROMELLOSE 2910 1 DROP: 5 SOLUTION OPHTHALMIC at 08:01

## 2022-01-15 RX ADMIN — CEFTAZIDIME, AVIBACTAM 2.5 G: 2; .5 POWDER, FOR SOLUTION INTRAVENOUS at 10:01

## 2022-01-15 RX ADMIN — MAGNESIUM SULFATE 2 G: 2 INJECTION INTRAVENOUS at 06:01

## 2022-01-15 NOTE — PLAN OF CARE
Problem: Adult Inpatient Plan of Care  Goal: Plan of Care Review  Outcome: Ongoing, Progressing     Problem: Glycemic Control Impaired (Sepsis/Septic Shock)  Goal: Blood Glucose Level Within Desired Range  Outcome: Ongoing, Progressing     Problem: Infection  Goal: Absence of Infection Signs and Symptoms  Outcome: Ongoing, Progressing     POC reviewed with the patient. Unable to assess level of understanding due to clinical condition. Patient remains trach/vent. White thick secretions noted. NSR/ST noted on monitor. Temperature regulated with assistance from warming blanket. PEG intact--appears to be tolerating TF at goal at this time; minimal residuals. FWB 110cc q4hr. Biliary drain in place--20cc output this shift. Rectal tube in place for watery stool. Continuous fluids stopped per order. Potassium and magnesium replaced early this morning; lab rechecks both within normal range. Bed locked in lowest position with bed alarm set, call light within reach. Safety precautions maintained.

## 2022-01-15 NOTE — PROGRESS NOTES
[facility] Hospital Medicine  Progress Note    Patient Name: Genna Felix  MRN: [id number]  Patient Class: IP- Inpatient   Admission Date: 1/3/2022  Length of Stay: 12 days  Attending Physician: Gabriella Allen MD  Primary Care Provider: Primary Doctor No        Subjective:     Principal Problem:Sepsis        HPI:  Genna Felix is a 61 year old female with a past medical history of anoxic brain injury s/p G tube and tracheostomy, PE, HTN, GERD who presented from [place] for further evaluation of tachycardia.  She was discharged from this facility on 12/27/21 with a diagnosis of pneumonia with rx for bactrim.  In the ED she is found to be tachycardic with WBC 14K, lactic acid 2.6 and evidence of UTI.  CXR reveals worsening multifocal pneumonia.  Pt is nonverbal and cannot contribute to history of present illness, therefore further information is limited.  While in the ED, IV zosyn was initiated and she was give an IVF bolus.  She will be admitted to the service of hospital medicine for continued monitoring and treatment.      Overview/Hospital Course:  61-year-old female with past medical history of anoxic brain injury, PE, hypertension, GERD admitted to the hospital medicine service for UTI and worsening multifocal pneumonia.  She was placed on broad-spectrum antibiotics.  Pulmonologist was consulted.  Urine culture grew presumptive Proteus.  Sputum culture grew Pseudomonas species, Serratia species, Klebsiella pneumoniae (ESBL).  During hospital stay patient noted to have increase gastric residuals.  No definite abdominal obstruction identified.  Patient underwent a CT scan of the abdomen and pelvis which suggested acute cholecystitis for which General surgery team was consulted.  Patient underwent HIDA scan which confirm cystic duct obstruction.  General surgeon recommended percutaneous cholecystostomy drain placement which was done by Interventional Radiology on January 13, 2022.  Services  of infectious diseases specialist are being obtained who upgraded antibiotic therapy to intravenous Avycase.      Interval History:  H&H stable white count stable afebrile.  Secretions improved PEG tube feeding at goal.  Micro growing gram-negative delmi and Enterococcus    Review of Systems   Unable to perform ROS: Patient nonverbal     Objective:     Vital Signs (Most Recent):  Temp: 98.42 °F (36.9 °C) (01/15/22 1400)  Pulse: 97 (01/15/22 1400)  Resp: (!) 29 (01/15/22 1400)  BP: 138/72 (01/15/22 1400)  SpO2: 95 % (01/15/22 1400) Vital Signs (24h Range):  Temp:  [93.92 °F (34.4 °C)-99.32 °F (37.4 °C)] 98.42 °F (36.9 °C)  Pulse:  [] 97  Resp:  [24-34] 29  SpO2:  [94 %-100 %] 95 %  BP: ()/(51-76) 138/72     Weight: 73.1 kg (161 lb 2.5 oz)  Body mass index is 31.47 kg/m².    Intake/Output Summary (Last 24 hours) at 1/15/2022 1512  Last data filed at 1/15/2022 1455  Gross per 24 hour   Intake 3714.85 ml   Output 5420 ml   Net -1705.15 ml      Physical Exam  Vitals and nursing note reviewed.   Constitutional:       General: She is not in acute distress.     Appearance: She is well-developed. She is ill-appearing.      Comments: Chronic trach/vent dependent   HENT:      Head: Normocephalic and atraumatic.      Mouth/Throat:      Mouth: Mucous membranes are dry.   Cardiovascular:      Rate and Rhythm: Normal rate and regular rhythm.   Pulmonary:      Effort: Pulmonary effort is normal. No respiratory distress.      Breath sounds: Rhonchi present.   Abdominal:      General: Bowel sounds are normal.      Palpations: Abdomen is soft.      Tenderness: There is no abdominal tenderness.      Comments: g tube in place   Musculoskeletal:      Cervical back: Normal range of motion and neck supple.      Comments: Contractures to hands   Skin:     General: Skin is warm and dry.   Neurological:      GCS: GCS eye subscore is 1. GCS verbal subscore is 1. GCS motor subscore is 1.   Psychiatric:      Comments: Unable to assess          Significant Labs:   All pertinent labs within the past 24 hours have been reviewed.  BMP:   Recent Labs   Lab 01/15/22  0336 01/15/22  0336 01/15/22  1152   *  --   --    *  --   --    K 3.8   < > 4.8   CL 97  --   --    CO2 26  --   --    BUN 5*  --   --    CREATININE 0.4*  --   --    CALCIUM 8.6*  --   --    MG 1.5*   < > 2.0    < > = values in this interval not displayed.     CBC:   Recent Labs   Lab 01/14/22  0339 01/15/22  1341   WBC 5.03 5.62   HGB 7.7* 9.1*   HCT 24.5* 29.4*    481*       Significant Imaging: I have reviewed all pertinent imaging results/findings within the past 24 hours.      Assessment/Plan:      * Sepsis  This patient does have evidence of infective focus  My overall impression is sepsis. Vital signs were reviewed and noted in progress note.  Antibiotics given-   Antibiotics (From admission, onward)            Start     Stop Route Frequency Ordered    01/12/22 1930  cefTAZidime-avibactam (AVYCAZ) 2.5 g in dextrose 5 % 100 mL         -- IV Every 8 hours (non-standard times) 01/12/22 1757        Cultures were taken-   Microbiology Results (last 7 days)     Procedure Component Value Units Date/Time    Aerobic culture [[id number]]  (Abnormal) Collected: 01/13/22 1120    Order Status: Completed Specimen: Body Fluid from Gallbladder Updated: 01/15/22 1226     Aerobic Bacterial Culture GRAM NEGATIVE CHRISTINA  Moderate  Identification and susceptibility pending        ENTEROCOCCUS SPECIES  Moderate  Identification and susceptibility pending      Culture, Respiratory with Gram Stain [[id number]]  (Abnormal)  (Susceptibility) Collected: 01/05/22 2009    Order Status: Completed Specimen: Respiratory from Endotracheal Aspirate Updated: 01/15/22 1030     Respiratory Culture No S aureus isolated.      SERRATIA MARCESCENS  Many        KLEBSIELLA PNEUMONIAE ESBL  Moderate        PSEUDOMONAS AERUGINOSA   Moderate       Gram Stain (Respiratory) <10 epithelial cells per low power  field.     Gram Stain (Respiratory) Rare WBC's     Gram Stain (Respiratory) Few Gram negative rods     Gram Stain (Respiratory) Rare yeast    Blood culture [[id number]] Collected: 01/12/22 1627    Order Status: Completed Specimen: Blood from Peripheral, Left Hand Updated: 01/14/22 2312     Blood Culture, Routine No Growth to date      No Growth to date      No Growth to date    Blood culture [[id number]] Collected: 01/12/22 1627    Order Status: Completed Specimen: Blood from Peripheral, Right Hand Updated: 01/14/22 2312     Blood Culture, Routine No Growth to date      No Growth to date      No Growth to date    Culture, Anaerobe [[id number]] Collected: 01/13/22 1120    Order Status: Completed Specimen: Body Fluid from Gallbladder Updated: 01/14/22 1441     Anaerobic Culture Culture in progress    Blood culture x two cultures. Draw prior to antibiotics. [[id number]] Collected: 01/03/22 1950    Order Status: Completed Specimen: Blood Updated: 01/09/22 0612     Blood Culture, Routine No growth after 5 days.    Narrative:      Aerobic and anaerobic        Latest lactate reviewed, they are-  No results for input(s): LACTATE in the last 72 hours.    Organ dysfunction indicated by tachycardia, tachypnea  Source- respiratory, urine      Source control Achieved by- IV abx      Acute cholecystitis  Status post percutaneous cholecystostomy drain placement on January 13, 2022. Follow General surgery recommendations.  Continue intravenous antibiotic therapy as per ID specialist.        Hyponatremia  Follow BMP.       UTI (urinary tract infection)  IV meropenem; follow culture  Microbiology Results (last 7 days)     Procedure Component Value Units Date/Time    Aerobic culture [[id number]]  (Abnormal) Collected: 01/13/22 1120    Order Status: Completed Specimen: Body Fluid from Gallbladder Updated: 01/15/22 1226     Aerobic Bacterial Culture GRAM NEGATIVE CHRISTINA  Moderate  Identification and susceptibility pending        ENTEROCOCCUS  SPECIES  Moderate  Identification and susceptibility pending      Culture, Respiratory with Gram Stain [[id number]]  (Abnormal)  (Susceptibility) Collected: 01/05/22 2009    Order Status: Completed Specimen: Respiratory from Endotracheal Aspirate Updated: 01/15/22 1030     Respiratory Culture No S aureus isolated.      SERRATIA MARCESCENS  Many        KLEBSIELLA PNEUMONIAE ESBL  Moderate        PSEUDOMONAS AERUGINOSA   Moderate       Gram Stain (Respiratory) <10 epithelial cells per low power field.     Gram Stain (Respiratory) Rare WBC's     Gram Stain (Respiratory) Few Gram negative rods     Gram Stain (Respiratory) Rare yeast    Blood culture [[id number]] Collected: 01/12/22 1627    Order Status: Completed Specimen: Blood from Peripheral, Left Hand Updated: 01/14/22 2312     Blood Culture, Routine No Growth to date      No Growth to date      No Growth to date    Blood culture [[id number]] Collected: 01/12/22 1627    Order Status: Completed Specimen: Blood from Peripheral, Right Hand Updated: 01/14/22 2312     Blood Culture, Routine No Growth to date      No Growth to date      No Growth to date    Culture, Anaerobe [[id number]] Collected: 01/13/22 1120    Order Status: Completed Specimen: Body Fluid from Gallbladder Updated: 01/14/22 1441     Anaerobic Culture Culture in progress    Blood culture x two cultures. Draw prior to antibiotics. [[id number]] Collected: 01/03/22 1950    Order Status: Completed Specimen: Blood Updated: 01/09/22 0612     Blood Culture, Routine No growth after 5 days.    Narrative:      Aerobic and anaerobic                Pressure injury of left buttock, stage 4  Consult wound care  Turn q2h        Ventilator associated pneumonia  Failed OP abx  Pt vent dependent-consult pulmonology for vent management  On intravenous meropenem and vancomycin.  Blood and sputum cx  Nebulizer treatments        Gastroesophageal reflux disease  Chronic; utilize pepcid acutely.      Essential  hypertension  Chronic, stable.  Latest blood pressure and vitals reviewed-   Temp:  [96.08 °F (35.6 °C)-98.24 °F (36.8 °C)]   Pulse:  []   Resp:  [10-75]   BP: (113-160)/(62-98)   SpO2:  [94 %-99 %] .   Home meds for hypertension were reviewed and noted below.   No chronic antihypertensives noted. Review of home meds reveals midodrine which has been reordered.      Will utilize p.r.n. blood pressure medication only if patient's blood pressure greater than  180/110 and she develops symptoms such as worsening chest pain or shortness of breath.        Malnutrition of moderate degree  Nutrition consult.      Acute on chronic respiratory failure with hypoxia  Patient with Hypoxic Respiratory failure which is Chronic.  she is vent dependent. Supplemental oxygen was provided and noted- Vent Mode: A/CMV-VC  Oxygen Concentration (%):  [35] 35  Resp Rate Total:  [24 br/min-43 br/min] 24 br/min  Vt Set:  [350 mL] 350 mL  PEEP/CPAP:  [4.3 cmH20-8.2 cmH20] 5.3 cmH20.   Signs/symptoms of respiratory failure include- tachypnea and increased work of breathing. Contributing diagnoses includes - Aspiration and Pneumonia Labs and images were reviewed. Patient Has not had a recent ABG. Will treat underlying causes.    PEG (percutaneous endoscopic gastrostomy) status  Noted; care per nursing.  TF per nutrition recs.  Slowly advance PEG tube feeding.        Tracheostomy dependence  Noted; care per nursing      Anoxic brain injury  Noted.  Turn q2h      Anemia of chronic disease  Patient's anemia is currently controlled. S/p 0 units of PRBCs.  Current CBC reviewed-   Lab Results   Component Value Date    HGB 8.2 (L) 01/06/2022    HCT 26.9 (L) 01/06/2022    MCV 92 01/06/2022     01/06/2022     Monitor serial CBC and transfuse if patient becomes hemodynamically unstable, symptomatic or H/H drops below 7/21.           VTE Risk Mitigation (From admission, onward)         Ordered     enoxaparin injection 40 mg  Daily          01/04/22 0034     IP VTE HIGH RISK PATIENT  Once         01/04/22 0034     Place sequential compression device  Until discontinued         01/04/22 0034                Discharge Planning   [place]:      Code Status: Full Code   Is the patient medically ready for discharge?:     Reason for patient still in hospital (select all that apply): Patient trending condition and Treatment  Discharge Plan A: Long-term acute care facility (LTAC)            Critical care time spent on the evaluation and treatment of severe organ dysfunction, review of pertinent labs and imaging studies, discussions with consulting providers and discussions with patient/family: 60 minutes.      Gabriella Allen MD  Department of Hospital Medicine   [facility]

## 2022-01-15 NOTE — SUBJECTIVE & OBJECTIVE
Interval History:  H&H stable white count stable afebrile.  Secretions improved PEG tube feeding at goal.  Micro growing gram-negative delmi and Enterococcus    Review of Systems   Unable to perform ROS: Patient nonverbal     Objective:     Vital Signs (Most Recent):  Temp: 98.42 °F (36.9 °C) (01/15/22 1400)  Pulse: 97 (01/15/22 1400)  Resp: (!) 29 (01/15/22 1400)  BP: 138/72 (01/15/22 1400)  SpO2: 95 % (01/15/22 1400) Vital Signs (24h Range):  Temp:  [93.92 °F (34.4 °C)-99.32 °F (37.4 °C)] 98.42 °F (36.9 °C)  Pulse:  [] 97  Resp:  [24-34] 29  SpO2:  [94 %-100 %] 95 %  BP: ()/(51-76) 138/72     Weight: 73.1 kg (161 lb 2.5 oz)  Body mass index is 31.47 kg/m².    Intake/Output Summary (Last 24 hours) at 1/15/2022 1512  Last data filed at 1/15/2022 1455  Gross per 24 hour   Intake 3714.85 ml   Output 5420 ml   Net -1705.15 ml      Physical Exam  Vitals and nursing note reviewed.   Constitutional:       General: She is not in acute distress.     Appearance: She is well-developed. She is ill-appearing.      Comments: Chronic trach/vent dependent   HENT:      Head: Normocephalic and atraumatic.      Mouth/Throat:      Mouth: Mucous membranes are dry.   Cardiovascular:      Rate and Rhythm: Normal rate and regular rhythm.   Pulmonary:      Effort: Pulmonary effort is normal. No respiratory distress.      Breath sounds: Rhonchi present.   Abdominal:      General: Bowel sounds are normal.      Palpations: Abdomen is soft.      Tenderness: There is no abdominal tenderness.      Comments: g tube in place   Musculoskeletal:      Cervical back: Normal range of motion and neck supple.      Comments: Contractures to hands   Skin:     General: Skin is warm and dry.   Neurological:      GCS: GCS eye subscore is 1. GCS verbal subscore is 1. GCS motor subscore is 1.   Psychiatric:      Comments: Unable to assess         Significant Labs:   All pertinent labs within the past 24 hours have been reviewed.  BMP:   Recent Labs    Lab 01/15/22  0336 01/15/22  0336 01/15/22  1152   *  --   --    *  --   --    K 3.8   < > 4.8   CL 97  --   --    CO2 26  --   --    BUN 5*  --   --    CREATININE 0.4*  --   --    CALCIUM 8.6*  --   --    MG 1.5*   < > 2.0    < > = values in this interval not displayed.     CBC:   Recent Labs   Lab 01/14/22  0339 01/15/22  1341   WBC 5.03 5.62   HGB 7.7* 9.1*   HCT 24.5* 29.4*    481*       Significant Imaging: I have reviewed all pertinent imaging results/findings within the past 24 hours.

## 2022-01-15 NOTE — ASSESSMENT & PLAN NOTE
This patient does have evidence of infective focus  My overall impression is sepsis. Vital signs were reviewed and noted in progress note.  Antibiotics given-   Antibiotics (From admission, onward)            Start     Stop Route Frequency Ordered    01/12/22 1930  cefTAZidime-avibactam (AVYCAZ) 2.5 g in dextrose 5 % 100 mL         -- IV Every 8 hours (non-standard times) 01/12/22 1757        Cultures were taken-   Microbiology Results (last 7 days)     Procedure Component Value Units Date/Time    Aerobic culture [[id number]]  (Abnormal) Collected: 01/13/22 1120    Order Status: Completed Specimen: Body Fluid from Gallbladder Updated: 01/15/22 1226     Aerobic Bacterial Culture GRAM NEGATIVE CHRISTINA  Moderate  Identification and susceptibility pending        ENTEROCOCCUS SPECIES  Moderate  Identification and susceptibility pending      Culture, Respiratory with Gram Stain [[id number]]  (Abnormal)  (Susceptibility) Collected: 01/05/22 2009    Order Status: Completed Specimen: Respiratory from Endotracheal Aspirate Updated: 01/15/22 1030     Respiratory Culture No S aureus isolated.      SERRATIA MARCESCENS  Many        KLEBSIELLA PNEUMONIAE ESBL  Moderate        PSEUDOMONAS AERUGINOSA   Moderate       Gram Stain (Respiratory) <10 epithelial cells per low power field.     Gram Stain (Respiratory) Rare WBC's     Gram Stain (Respiratory) Few Gram negative rods     Gram Stain (Respiratory) Rare yeast    Blood culture [[id number]] Collected: 01/12/22 1627    Order Status: Completed Specimen: Blood from Peripheral, Left Hand Updated: 01/14/22 2312     Blood Culture, Routine No Growth to date      No Growth to date      No Growth to date    Blood culture [[id number]] Collected: 01/12/22 1627    Order Status: Completed Specimen: Blood from Peripheral, Right Hand Updated: 01/14/22 2312     Blood Culture, Routine No Growth to date      No Growth to date      No Growth to date    Culture, Anaerobe [[id number]] Collected: 01/13/22  1120    Order Status: Completed Specimen: Body Fluid from Gallbladder Updated: 01/14/22 1441     Anaerobic Culture Culture in progress    Blood culture x two cultures. Draw prior to antibiotics. [[id number]] Collected: 01/03/22 1950    Order Status: Completed Specimen: Blood Updated: 01/09/22 0612     Blood Culture, Routine No growth after 5 days.    Narrative:      Aerobic and anaerobic        Latest lactate reviewed, they are-  No results for input(s): LACTATE in the last 72 hours.    Organ dysfunction indicated by tachycardia, tachypnea  Source- respiratory, urine      Source control Achieved by- IV abx

## 2022-01-15 NOTE — NURSING
Notified Tiffany CHEN of patient /71 at this time and due to receive midodrine 10 mg. Holding midodrine dose per Tiffany CHEN order. MD adjusted dose to 5 mg TID starting tonight at 2100.

## 2022-01-15 NOTE — PLAN OF CARE
Pt tolerating TF w/o any episodes of nausea/vomiting, TF at goal rate of 50ml/hr. Biliary drain patent w bile colored drainage. Hamilton, Flexi-seal patent. IVF infusing as per order. Will continue with current POC, safety measures active. Will continue to monitor.    Problem: Communication Impairment (Mechanical Ventilation, Invasive)  Goal: Effective Communication  Outcome: Ongoing, Progressing     Problem: Device-Related Complication Risk (Mechanical Ventilation, Invasive)  Goal: Optimal Device Function  Outcome: Ongoing, Progressing     Problem: Inability to Wean (Mechanical Ventilation, Invasive)  Goal: Mechanical Ventilation Liberation  Outcome: Ongoing, Progressing     Problem: Nutrition Impairment (Mechanical Ventilation, Invasive)  Goal: Optimal Nutrition Delivery  Outcome: Ongoing, Progressing     Problem: Skin and Tissue Injury (Mechanical Ventilation, Invasive)  Goal: Absence of Device-Related Skin and Tissue Injury  Outcome: Ongoing, Progressing     Problem: Ventilator-Induced Lung Injury (Mechanical Ventilation, Invasive)  Goal: Absence of Ventilator-Induced Lung Injury  Outcome: Ongoing, Progressing     Problem: Communication Impairment (Artificial Airway)  Goal: Effective Communication  Outcome: Ongoing, Progressing     Problem: Device-Related Complication Risk (Artificial Airway)  Goal: Optimal Device Function  Outcome: Ongoing, Progressing     Problem: Skin and Tissue Injury (Artificial Airway)  Goal: Absence of Device-Related Skin or Tissue Injury  Outcome: Ongoing, Progressing     Problem: Noninvasive Ventilation Acute  Goal: Effective Unassisted Ventilation and Oxygenation  Outcome: Ongoing, Progressing     Problem: Adult Inpatient Plan of Care  Goal: Plan of Care Review  Outcome: Ongoing, Progressing  Goal: Patient-Specific Goal (Individualized)  Outcome: Ongoing, Progressing  Goal: Absence of Hospital-Acquired Illness or Injury  Outcome: Ongoing, Progressing  Goal: Optimal Comfort and  Wellbeing  Outcome: Ongoing, Progressing  Goal: Readiness for Transition of Care  Outcome: Ongoing, Progressing     Problem: Adjustment to Illness (Delirium)  Goal: Optimal Coping  Outcome: Ongoing, Progressing     Problem: Altered Behavior (Delirium)  Goal: Improved Behavioral Control  Outcome: Ongoing, Progressing     Problem: Attention and Thought Clarity Impairment (Delirium)  Goal: Improved Attention and Thought Clarity  Outcome: Ongoing, Progressing     Problem: Sleep Disturbance (Delirium)  Goal: Improved Sleep  Outcome: Ongoing, Progressing     Problem: Adjustment to Illness (Sepsis/Septic Shock)  Goal: Optimal Coping  Outcome: Ongoing, Progressing     Problem: Bleeding (Sepsis/Septic Shock)  Goal: Absence of Bleeding  Outcome: Ongoing, Progressing     Problem: Glycemic Control Impaired (Sepsis/Septic Shock)  Goal: Blood Glucose Level Within Desired Range  Outcome: Ongoing, Progressing     Problem: Infection Progression (Sepsis/Septic Shock)  Goal: Absence of Infection Signs and Symptoms  Outcome: Ongoing, Progressing     Problem: Nutrition Impaired (Sepsis/Septic Shock)  Goal: Optimal Nutrition Intake  Outcome: Ongoing, Progressing     Problem: Impaired Wound Healing  Goal: Optimal Wound Healing  Outcome: Ongoing, Progressing     Problem: Fall Injury Risk  Goal: Absence of Fall and Fall-Related Injury  Outcome: Ongoing, Progressing     Problem: Skin Injury Risk Increased  Goal: Skin Health and Integrity  Outcome: Ongoing, Progressing     Problem: Infection  Goal: Absence of Infection Signs and Symptoms  Outcome: Ongoing, Progressing     Problem: Aspiration (Enteral Nutrition)  Goal: Absence of Aspiration Signs and Symptoms  Outcome: Ongoing, Progressing     Problem: Device-Related Complication Risk (Enteral Nutrition)  Goal: Safe, Effective Therapy Delivery  Outcome: Ongoing, Progressing     Problem: Feeding Intolerance (Enteral Nutrition)  Goal: Feeding Tolerance  Outcome: Ongoing, Progressing

## 2022-01-15 NOTE — CARE UPDATE
01/14/22 1907   Patient Assessment/Suction   Respiratory Effort Unlabored   Expansion/Accessory Muscles/Retractions no use of accessory muscles;no retractions;expansion symmetric   All Lung Fields Breath Sounds Anterior:;Lateral:;coarse   Rhythm/Pattern, Respiratory assisted mechanically   Cough Frequency with stimulation   Suction Method tracheal   Suction Pressure (mmHg) 100 mmHg   $ Suction Charges Inline Suction Procedure Stat Charge   Secretions Amount moderate   Secretions Color yellow;white   Secretions Characteristics thick   Sputum Collection sample obtained per suctioning   $ Swab or suction? Suction   Aspirate Toleration TENA (no adverse reactions)   Sent to the lab? No   PRE-TX-O2   O2 Device (Oxygen Therapy) ventilator   $ Is the patient on Low Flow Oxygen? Yes   Oxygen Concentration (%) 40   SpO2 100 %   Pulse Oximetry Type Continuous   $ Pulse Oximetry - Multiple Charge Pulse Oximetry - Multiple   Pulse 96   Resp (!) 26   Temp 97.88 °F (36.6 °C)   ETCO2   $ ETCO2 Usage Currently wearing   ETCO2 (mmHg) 31 mmHg   ETCO2 Device Type Bedside Monitor;Artificial Airway;Ventilator   Aerosol Therapy   $ Aerosol Therapy Charges Aerosol Treatment   Respiratory Treatment Status (SVN) given   Treatment Route (SVN) in-line;tracheostomy   Patient Position (SVN) HOB elevated   Post Treatment Assessment (SVN) breath sounds unchanged   Signs of Intolerance (SVN) none   Breath Sounds Post-Respiratory Treatment   Throughout All Fields Post-Treatment All Fields   Throughout All Fields Post-Treatment no change        Surgical Airway Portex Cuffed   No placement date or time found.   Present Prior to Hospital Arrival?: Yes  Type: Tracheostomy  Brand: Portex  Airway Device Size: 8.0  Style: Cuffed   Cuff Pressure 32 cm H2O   Cuff Inflation? Inflated   Speaking Valve Usage Not wearing   Status Secured   Site Assessment Dry;Clean   Ties Assessment Secure;Intact;Dry;Clean   Airway Safety   Ambu bag with the patient? Yes,  Adult Ambu   Is mask with the patient? Yes, Adult Mask   Suction set is at the bedside? Yes   Extra trach at bedside? Yes  (on ventilator)   Is obturator available? Yes   Location of obturator?    (in box with trach on ventilator)   Vent Select   Conventional Vent Y   $ Ventilator Subsequent 1   Charged w/in last 24h YES   Preset Conventional Ventilator Settings   Vent Type NKV-550   Ventilation Type VC   Vent Mode A/CMV-VC   Humidity HME   Set Rate 24 BPM   Vt Set 350 mL   PEEP/CPAP 3.3 cmH20   Insp Time 0.8 Sec(s)   I-Trigger Type  Flow   Trigger Sensitivity Flow/I-Trigger 2.5 L/min   Patient Ventilator Parameters   Resp Rate Total 26 br/min   Peak Airway Pressure 37.1 cmH2O   Mean Airway Pressure 13.5 cmH20   Exhaled Vt 346 mL   Total Ve 7.78 mL   I:E Ratio Measured 1:2.1   Total PEEP 5.5 cmH20   Tube Type Trach   Inspired Tidal Volume (VTI) 357 mL   Conventional Ventilator Alarms   Alarms On Y   Ve High Alarm 20 L/min   Ve Low Alarm 3 L/min   Vt High Alarm 15 mL   Vt Low Alarm 4 mL   Resp Rate High Alarm 50 br/min   Press High Alarm 60 cmH2O   Apnea Rate 15   Apnea Volume (mL) 450 mL   T Apnea 20 sec(s)   Delay Alarm (Sec) 30 sec(s)   Ready to Wean/Extubation Screen   FIO2<=50 (chart decimal) 0.4   MV<16L (chart vol.) 7.78   PEEP <=8 (chart #) 3.3   Ready to Wean Parameters   F/VT Ratio<105 (RSBI) (!) 75.14

## 2022-01-15 NOTE — PROGRESS NOTES
Progress Note  Infectious Disease    Reason for Consult:  Acute cholecystitis/ pneumonia    HPI: Genna Felix is a 61 y.o. female resident of [facility], with past medical history of anoxic brain injury status post tracheostomy/peg tube, prior PE, hypertension, GERD sent for tachycardia.  Patient with prior admission to [facility] for pneumonia, discharged 12/27/21 on Bactrim.  Information obtained from medical chart.  Patient is nonverbal at baseline, no family present.  In the ER, tachycardic, trach to vent.   Lab significant for WBC of 14, PMN:  87.5%  UA grossly positive, urine culture grew Proteus mirabilis likely ESBL.  Blood cultures from admission 1/4 bottles grew CoNS, likely a contaminant.  Chest x-ray right lung airspace disease c/w pneumonia.  Respiratory culture from 01/05 with multiple organisms including Serratia marcescens, ESBL Klebsiella pneumoniae, and  Pseudomonas.    Empirically started on Vancomycin and Zosyn, switched to Meropenem 1/9.     Further workup including CT scan abdomen/pelvis revealed findings c/w acute cholecystitis with 2 stones in the cystic duct.  HIDA scan suggestive of cystic duct obstruction.     Plan for IR guided percutaneous drainage on 1/13.     ID consult for acute cholecystitis and antibiotic recommendations.     INTERVAL HISTORY:   1/13:  Interim reviewed, patient status post IR guided per ирина.  Large amount of purulent bilious fluid drained, drain in place, fluid sent for cultures, in process.  Tachypneic, hypertensive, afebrile. Tracheal secretions remain thick and purulent. Repeat blood cultures 1/12 x 2 sets no growth. .    1/14: Patient seen and examined at bedside. No acute events overnight, she is trach to vent FiO2 50%, cloudy white secretions.WBC: 5, no left shift, stable. Lab called, sensitivities for Avycaz will be available later today or tomorrow morning. IR drain in place, draining purulent bilious fluid. Repeat blood cultures no growth.      01/15/2022 Dr Mccormack  No new eventomfortable.  Intubated.  Nonverbal., on vent 24/350/30%  procal 0.57---0.15  CRP  123.8---62.9   GB fluid GNR      Antibiotics (From admission, onward)            Start     Stop Route Frequency Ordered    01/12/22 1930  cefTAZidime-avibactam (AVYCAZ) 2.5 g in dextrose 5 % 100 mL         -- IV Every 8 hours (non-standard times) 01/12/22 1757        Antifungals (From admission, onward)            None        Antivirals (From admission, onward)    None          Review of patient's allergies indicates:  No Known Allergies  Past Medical History:   Diagnosis Date    Anoxic brain damage 07/2020    Bedbound 07/2020    Cardiac arrest 07/2020    Cirrhosis     GERD (gastroesophageal reflux disease)     Hemangioma of intra-abdominal structure     Hypertension     Nursing home resident     Protein calorie malnutrition     Pulmonary embolus     Respiratory distress     S/P percutaneous endoscopic gastrostomy (PEG) tube placement 07/2020    Total self-care deficit 07/2020    Tracheostomy dependence     7/2020     Past Surgical History:   Procedure Laterality Date    PEG W/TRACHEOSTOMY PLACEMENT  07/27/2020    SKIN GRAFT      buttocks     Social History     Tobacco Use    Smoking status: Never Smoker    Smokeless tobacco: Never Used   Substance Use Topics    Alcohol use: Not Currently        Family History   Problem Relation Age of Onset    No Known Problems Mother     No Known Problems Father          Review of Systems:   Unable to obtain, patient is non-verbal at baseline     EXAM & DIAGNOSTICS REVIEWED:   Vitals:     Temp:  [93.92 °F (34.4 °C)-99 °F (37.2 °C)]   Temp: 97.88 °F (36.6 °C) (01/15/22 0900)  Pulse: 101 (01/15/22 0900)  Resp: (!) 27 (01/15/22 0900)  BP: 108/62 (01/15/22 0900)  SpO2: 99 % (01/15/22 0900)    Intake/Output Summary (Last 24 hours) at 1/15/2022 0950  Last data filed at 1/15/2022 0830  Gross per 24 hour   Intake 3767.62 ml   Output 4270 ml   Net  -502.38 ml       General:  Chronically ill-appearing, trach to vent FiO2 30%, in NAD. Alert, moving head, not following commands    Eyes:  Hyperemic conjunctiva both eyes, improving, anicteric,  ENT:  Limited, moist mucus membranes, no thrush  Neck:  Supple  Lungs: Rhonchi R, L clear, thick white secretions  Heart:  S1/S2+, regular rhythm, no murmurs  Abd:  RUQ IR drain in place, purulent bilious fluid    PEG tube in place, no evidence of redness or purulent drainage from ostomy, +BS, soft, non tender, non distended  :  Hamilton, cloudy urine  Musc:  Flexure contractures on hands  Skin:  Warm, no rash, thickening of nails   Wound:   Neuro: Not following commands  Psych:  Calm  Lymphatic:     No cervical, supraclavicular nodes  Extrem: No LE edema b/l  VAD:         Isolation:     1/4/22:       Sacrum  Right hand    Right elbow      General Labs reviewed:  Recent Labs   Lab 01/11/22  0416 01/12/22  0330 01/14/22  0339   WBC 10.49 5.97 5.03   HGB 7.6* 7.6* 7.7*   HCT 24.3* 24.3* 24.5*    320 348       Recent Labs   Lab 01/13/22  0334 01/14/22  0339 01/15/22  0336   * 132* 131*   K 3.9 3.9 3.8   CL 95 95 97   CO2 25 27 26   BUN 4* 5* 5*   CREATININE 0.4* 0.4* 0.4*   CALCIUM 8.5* 8.5* 8.6*   PROT 6.1 6.2 6.4   BILITOT 0.4 0.4 0.4   ALKPHOS 135 137* 151*   ALT 13 16 13   AST 13 13 11     Recent Labs   Lab 01/12/22  1627 01/14/22 0339   .8* 62.9*     No results for input(s): SEDRATE in the last 168 hours.    Estimated Creatinine Clearance: 135.1 mL/min (A) (based on SCr of 0.4 mg/dL (L)).     Micro:  Microbiology Results (last 7 days)     Procedure Component Value Units Date/Time    Blood culture [[id number]] Collected: 01/12/22 1627    Order Status: Completed Specimen: Blood from Peripheral, Left Hand Updated: 01/14/22 2317     Blood Culture, Routine No Growth to date      No Growth to date      No Growth to date    Blood culture [[id number]] Collected: 01/12/22 1627    Order Status: Completed Specimen:  Blood from Peripheral, Right Hand Updated: 01/14/22 2312     Blood Culture, Routine No Growth to date      No Growth to date      No Growth to date    Culture, Respiratory with Gram Stain [[id number]]  (Abnormal)  (Susceptibility) Collected: 01/05/22 2009    Order Status: Completed Specimen: Respiratory from Endotracheal Aspirate Updated: 01/14/22 1444     Respiratory Culture No S aureus isolated.      SERRATIA MARCESCENS  Many        KLEBSIELLA PNEUMONIAE ESBL  Moderate        PSEUDOMONAS AERUGINOSA   Moderate       Gram Stain (Respiratory) <10 epithelial cells per low power field.     Gram Stain (Respiratory) Rare WBC's     Gram Stain (Respiratory) Few Gram negative rods     Gram Stain (Respiratory) Rare yeast    Culture, Anaerobe [[id number]] Collected: 01/13/22 1120    Order Status: Completed Specimen: Body Fluid from Gallbladder Updated: 01/14/22 1441     Anaerobic Culture Culture in progress    Aerobic culture [[id number]]  (Abnormal) Collected: 01/13/22 1120    Order Status: Completed Specimen: Body Fluid from Gallbladder Updated: 01/14/22 1315     Aerobic Bacterial Culture GRAM NEGATIVE CHRISTINA  Moderate  Identification and susceptibility pending      Blood culture x two cultures. Draw prior to antibiotics. [[id number]] Collected: 01/03/22 1950    Order Status: Completed Specimen: Blood Updated: 01/09/22 0612     Blood Culture, Routine No growth after 5 days.    Narrative:      Aerobic and anaerobic          Imaging Reviewed:  CXRs  CT abdomen/pelvis   HIDA scan      IMPRESSION & PLAN     1.  Sepsis resolved, multifactorial; right pneumonia/UTI/acute cholecystitis s/p IR guided perc ирина 1/13   Bilious fluid cultures in process--GNR   Urine culture grew Proteus mirabilis likely ESBL   Blood cultures from admission 1/4 bottles grew CoNS, likely a contaminant   Respiratory culture 1/5 with multiple organisms; Serratia marcescens, ESBL Klebsiella pneumoniae, and  Pseudomonas, the latter susceptible to  Avycaz   Repeat blood cultures x 2 sets no growth pending final    Procalcitonin 0.57     2.  Anoxic brain injury/bed-bound, non-verbal, debility     Recommendations:  Continue Avycaz 2.5g IV q8h, lab called to set up plates for ESBL Klebsiella and Serratia, available 1/15   Follow cultures        Medical Decision Making during this encounter was  [_] Low Complexity  [_] Moderate Complexity  [xx] High Complexity

## 2022-01-15 NOTE — CARE UPDATE
01/15/22 0710   Patient Assessment/Suction   Level of Consciousness (AVPU) alert   All Lung Fields Breath Sounds coarse;diminished   Suction Method tracheal   $ Suction Charges Inline Suction Procedure Stat Charge   Secretions Amount small   Secretions Color pale;yellow   Secretions Characteristics thick   PRE-TX-O2   O2 Device (Oxygen Therapy) ventilator   $ Is the patient on Low Flow Oxygen? Yes   Oxygen Concentration (%) 40   SpO2 100 %   Pulse Oximetry Type Continuous   $ Pulse Oximetry - Multiple Charge Pulse Oximetry - Multiple   Pulse 95   Resp (!) 27   Temp 96.8 °F (36 °C)   ETCO2   $ ETCO2 Usage Currently wearing   ETCO2 (mmHg) 33 mmHg   Aerosol Therapy   $ Aerosol Therapy Charges Aerosol Treatment   Respiratory Treatment Status (SVN) given   Treatment Route (SVN) in-line   Patient Position (SVN) HOB elevated   Signs of Intolerance (SVN) none   Skin Integrity   $ Wound Care Tech Time 15 min   Area Observed Neck under tracheostomy   Skin Appearance without discoloration   Barrier used? Other (see comments)  (drain sponge)        Surgical Airway Portex Cuffed   No placement date or time found.   Present Prior to Hospital Arrival?: Yes  Type: Tracheostomy  Brand: Portex  Airway Device Size: 8.0  Style: Cuffed   Status Secured   Site Assessment Clean;Dry   Ties Assessment Clean;Dry;Intact   Airway Safety   Ambu bag with the patient? Yes, Adult Ambu   Is mask with the patient? Yes, Adult Mask   Vent Select   Conventional Vent Y   Ventilator Initiated No   $ Ventilator Subsequent 1   Charged w/in last 24h YES   Preset Conventional Ventilator Settings   Vent Type NKV-550   Ventilation Type VC   Vent Mode A/CMV-VC   Humidity HME   Set Rate 24 BPM   Vt Set 350 mL   PEEP/CPAP 5.8 cmH20   Patient Ventilator Parameters   Resp Rate Total 28 br/min   Peak Airway Pressure 31.2 cmH2O   Exhaled Vt 350 mL   Total Ve 8.88 mL   I:E Ratio Measured 1:1.9   Inspired Tidal Volume (VTI) 358 mL   Conventional Ventilator Alarms    Alarms On Y   Apnea Rate 15   Apnea Volume (mL) 450 mL   Ready to Wean/Extubation Screen   FIO2<=50 (chart decimal) 0.4   MV<16L (chart vol.) 8.88   PEEP <=8 (chart #) 5.8   Ready to Wean Parameters   F/VT Ratio<105 (RSBI) (!) 77.14     Decreased FIO2 to 30%

## 2022-01-15 NOTE — ASSESSMENT & PLAN NOTE
IV meropenem; follow culture  Microbiology Results (last 7 days)     Procedure Component Value Units Date/Time    Aerobic culture [[id number]]  (Abnormal) Collected: 01/13/22 1120    Order Status: Completed Specimen: Body Fluid from Gallbladder Updated: 01/15/22 1226     Aerobic Bacterial Culture GRAM NEGATIVE CHRISTINA  Moderate  Identification and susceptibility pending        ENTEROCOCCUS SPECIES  Moderate  Identification and susceptibility pending      Culture, Respiratory with Gram Stain [[id number]]  (Abnormal)  (Susceptibility) Collected: 01/05/22 2009    Order Status: Completed Specimen: Respiratory from Endotracheal Aspirate Updated: 01/15/22 1030     Respiratory Culture No S aureus isolated.      SERRATIA MARCESCENS  Many        KLEBSIELLA PNEUMONIAE ESBL  Moderate        PSEUDOMONAS AERUGINOSA   Moderate       Gram Stain (Respiratory) <10 epithelial cells per low power field.     Gram Stain (Respiratory) Rare WBC's     Gram Stain (Respiratory) Few Gram negative rods     Gram Stain (Respiratory) Rare yeast    Blood culture [[id number]] Collected: 01/12/22 1627    Order Status: Completed Specimen: Blood from Peripheral, Left Hand Updated: 01/14/22 2312     Blood Culture, Routine No Growth to date      No Growth to date      No Growth to date    Blood culture [[id number]] Collected: 01/12/22 1627    Order Status: Completed Specimen: Blood from Peripheral, Right Hand Updated: 01/14/22 2312     Blood Culture, Routine No Growth to date      No Growth to date      No Growth to date    Culture, Anaerobe [[id number]] Collected: 01/13/22 1120    Order Status: Completed Specimen: Body Fluid from Gallbladder Updated: 01/14/22 1441     Anaerobic Culture Culture in progress    Blood culture x two cultures. Draw prior to antibiotics. [[id number]] Collected: 01/03/22 1950    Order Status: Completed Specimen: Blood Updated: 01/09/22 0612     Blood Culture, Routine No growth after 5 days.    Narrative:      Aerobic and anaerobic

## 2022-01-16 LAB
ALBUMIN SERPL BCP-MCNC: 2.5 G/DL (ref 3.5–5.2)
ALP SERPL-CCNC: 166 U/L (ref 55–135)
ALT SERPL W/O P-5'-P-CCNC: 14 U/L (ref 10–44)
ANION GAP SERPL CALC-SCNC: 11 MMOL/L (ref 8–16)
AST SERPL-CCNC: 19 U/L (ref 10–40)
BILIRUB SERPL-MCNC: 0.5 MG/DL (ref 0.1–1)
BUN SERPL-MCNC: 7 MG/DL (ref 6–20)
CALCIUM SERPL-MCNC: 9.3 MG/DL (ref 8.7–10.5)
CHLORIDE SERPL-SCNC: 96 MMOL/L (ref 95–110)
CO2 SERPL-SCNC: 25 MMOL/L (ref 23–29)
CREAT SERPL-MCNC: 0.5 MG/DL (ref 0.5–1.4)
EST. GFR  (AFRICAN AMERICAN): >60 ML/MIN/1.73 M^2
EST. GFR  (NON AFRICAN AMERICAN): >60 ML/MIN/1.73 M^2
GLUCOSE SERPL-MCNC: 107 MG/DL (ref 70–110)
MAGNESIUM SERPL-MCNC: 1.6 MG/DL (ref 1.6–2.6)
PHOSPHATE SERPL-MCNC: 3 MG/DL (ref 2.7–4.5)
POCT GLUCOSE: 108 MG/DL (ref 70–110)
POCT GLUCOSE: 127 MG/DL (ref 70–110)
POCT GLUCOSE: 147 MG/DL (ref 70–110)
POTASSIUM SERPL-SCNC: 4.7 MMOL/L (ref 3.5–5.1)
PROT SERPL-MCNC: 7.9 G/DL (ref 6–8.4)
SODIUM SERPL-SCNC: 132 MMOL/L (ref 136–145)

## 2022-01-16 PROCEDURE — 27000207 HC ISOLATION

## 2022-01-16 PROCEDURE — 94640 AIRWAY INHALATION TREATMENT: CPT

## 2022-01-16 PROCEDURE — 27000221 HC OXYGEN, UP TO 24 HOURS

## 2022-01-16 PROCEDURE — 36415 COLL VENOUS BLD VENIPUNCTURE: CPT | Performed by: NURSE PRACTITIONER

## 2022-01-16 PROCEDURE — 25000242 PHARM REV CODE 250 ALT 637 W/ HCPCS: Performed by: NURSE PRACTITIONER

## 2022-01-16 PROCEDURE — 83735 ASSAY OF MAGNESIUM: CPT | Performed by: NURSE PRACTITIONER

## 2022-01-16 PROCEDURE — 94761 N-INVAS EAR/PLS OXIMETRY MLT: CPT

## 2022-01-16 PROCEDURE — 94003 VENT MGMT INPAT SUBQ DAY: CPT

## 2022-01-16 PROCEDURE — 27200966 HC CLOSED SUCTION SYSTEM

## 2022-01-16 PROCEDURE — 25000003 PHARM REV CODE 250: Performed by: SURGERY

## 2022-01-16 PROCEDURE — 25000003 PHARM REV CODE 250: Performed by: STUDENT IN AN ORGANIZED HEALTH CARE EDUCATION/TRAINING PROGRAM

## 2022-01-16 PROCEDURE — 99233 PR SUBSEQUENT HOSPITAL CARE,LEVL III: ICD-10-PCS | Mod: S$GLB,,, | Performed by: INTERNAL MEDICINE

## 2022-01-16 PROCEDURE — 25000003 PHARM REV CODE 250: Performed by: INTERNAL MEDICINE

## 2022-01-16 PROCEDURE — 20000000 HC ICU ROOM

## 2022-01-16 PROCEDURE — 80053 COMPREHEN METABOLIC PANEL: CPT | Performed by: NURSE PRACTITIONER

## 2022-01-16 PROCEDURE — 63600175 PHARM REV CODE 636 W HCPCS: Performed by: INTERNAL MEDICINE

## 2022-01-16 PROCEDURE — 63600175 PHARM REV CODE 636 W HCPCS: Performed by: NURSE PRACTITIONER

## 2022-01-16 PROCEDURE — 25000003 PHARM REV CODE 250: Performed by: NURSE PRACTITIONER

## 2022-01-16 PROCEDURE — 99900026 HC AIRWAY MAINTENANCE (STAT)

## 2022-01-16 PROCEDURE — 99233 SBSQ HOSP IP/OBS HIGH 50: CPT | Mod: S$GLB,,, | Performed by: INTERNAL MEDICINE

## 2022-01-16 PROCEDURE — 63600175 PHARM REV CODE 636 W HCPCS: Performed by: STUDENT IN AN ORGANIZED HEALTH CARE EDUCATION/TRAINING PROGRAM

## 2022-01-16 PROCEDURE — 99900035 HC TECH TIME PER 15 MIN (STAT)

## 2022-01-16 PROCEDURE — 84100 ASSAY OF PHOSPHORUS: CPT | Performed by: NURSE PRACTITIONER

## 2022-01-16 RX ADMIN — IPRATROPIUM BROMIDE AND ALBUTEROL SULFATE 3 ML: 2.5; .5 SOLUTION RESPIRATORY (INHALATION) at 07:01

## 2022-01-16 RX ADMIN — HYPROMELLOSE 2910 1 DROP: 5 SOLUTION OPHTHALMIC at 08:01

## 2022-01-16 RX ADMIN — MIDODRINE HYDROCHLORIDE 5 MG: 5 TABLET ORAL at 09:01

## 2022-01-16 RX ADMIN — ENOXAPARIN SODIUM 40 MG: 40 INJECTION SUBCUTANEOUS at 05:01

## 2022-01-16 RX ADMIN — CEFTAZIDIME, AVIBACTAM 2.5 G: 2; .5 POWDER, FOR SOLUTION INTRAVENOUS at 07:01

## 2022-01-16 RX ADMIN — CHLORHEXIDINE GLUCONATE 0.12% ORAL RINSE 15 ML: 1.2 LIQUID ORAL at 08:01

## 2022-01-16 RX ADMIN — FAMOTIDINE 20 MG: 10 INJECTION INTRAVENOUS at 08:01

## 2022-01-16 RX ADMIN — FAMOTIDINE 20 MG: 10 INJECTION INTRAVENOUS at 09:01

## 2022-01-16 RX ADMIN — IPRATROPIUM BROMIDE AND ALBUTEROL SULFATE 3 ML: 2.5; .5 SOLUTION RESPIRATORY (INHALATION) at 03:01

## 2022-01-16 RX ADMIN — IPRATROPIUM BROMIDE AND ALBUTEROL SULFATE 3 ML: 2.5; .5 SOLUTION RESPIRATORY (INHALATION) at 04:01

## 2022-01-16 RX ADMIN — HYPROMELLOSE 2910 1 DROP: 5 SOLUTION OPHTHALMIC at 09:01

## 2022-01-16 RX ADMIN — VANCOMYCIN HYDROCHLORIDE 1250 MG: 1.25 INJECTION, POWDER, LYOPHILIZED, FOR SOLUTION INTRAVENOUS at 01:01

## 2022-01-16 RX ADMIN — CEFTAZIDIME, AVIBACTAM 2.5 G: 2; .5 POWDER, FOR SOLUTION INTRAVENOUS at 03:01

## 2022-01-16 RX ADMIN — CEFTAZIDIME, AVIBACTAM 2.5 G: 2; .5 POWDER, FOR SOLUTION INTRAVENOUS at 11:01

## 2022-01-16 RX ADMIN — HYPROMELLOSE 2910 1 DROP: 5 SOLUTION OPHTHALMIC at 05:01

## 2022-01-16 RX ADMIN — CHLORHEXIDINE GLUCONATE 0.12% ORAL RINSE 15 ML: 1.2 LIQUID ORAL at 09:01

## 2022-01-16 RX ADMIN — IPRATROPIUM BROMIDE AND ALBUTEROL SULFATE 3 ML: 2.5; .5 SOLUTION RESPIRATORY (INHALATION) at 11:01

## 2022-01-16 NOTE — PLAN OF CARE
Unable to discuss goals with patient due to level of consciousness.     Problem: Fall Injury Risk  Goal: Absence of Fall and Fall-Related Injury  Outcome: Ongoing, Progressing-Safety maintained     Problem: Feeding Intolerance (Enteral Nutrition)  Goal: Feeding Tolerance  Outcome: Ongoing, Progressing-Tolerating feeding well     Problem: Glycemic Control Impaired (Sepsis/Septic Shock)  Goal: Blood Glucose Level Within Desired Range  Outcome: Ongoing, Progressing-glucose monitored

## 2022-01-16 NOTE — CARE UPDATE
01/15/22 1857   Preset Conventional Ventilator Settings   Vent Type NKV-550   Ventilation Type VC   Vent Mode A/CMV-VC   Humidity HME   Set Rate 24 BPM   Vt Set 350 mL   PEEP/CPAP 5 cmH20   Waveform RAMP   Insp Time 0.8 Sec(s)   Fio2 30%, aerosol txs q4 HRS

## 2022-01-16 NOTE — CONSULTS
Pharmacokinetic Initial Assessment: IV Vancomycin    Assessment/Plan:    Initiate vancomycin 1250 mg IV every 12 hours.  Desired empiric serum trough concentration is 15 to 20 mcg/mL.  Draw vancomycin trough level 60 min prior to third dose on 1/17 at approximately 1200.  Pharmacy will continue to follow and monitor vancomycin.      Please contact pharmacy at extension [phone] with any questions regarding this assessment.     Thank you for the consult,   She Madisyn       Patient brief summary:  Genna Felix is a 61 y.o. female initiated on antimicrobial therapy with IV Vancomycin for treatment of suspected intra-abdominal infection    Drug Allergies:   Review of patient's allergies indicates:  No Known Allergies    Actual Body Weight:   71.7 kg    Renal Function:   Estimated Creatinine Clearance: 107.1 mL/min (based on SCr of 0.5 mg/dL).,     Dialysis Method (if applicable):  N/A    CBC (last 72 hours):  Recent Labs   Lab Result Units 01/14/22  0339 01/15/22  1341   WBC K/uL 5.03 5.62   Hemoglobin g/dL 7.7* 9.1*   Hematocrit % 24.5* 29.4*   Platelets K/uL 348 481*   Gran % % 52.1 64.6   Lymph % % 27.4 19.2   Mono % % 14.9 10.3   Eosinophil % % 4.6 5.3   Basophil % % 0.4 0.2   Differential Method  Automated Automated       Metabolic Panel (last 72 hours):  Recent Labs   Lab Result Units 01/14/22  0339 01/15/22  0336 01/15/22  1152 01/16/22  0345   Sodium mmol/L 132* 131*  --  132*   Potassium mmol/L 3.9 3.8 4.8 4.7   Chloride mmol/L 95 97  --  96   CO2 mmol/L 27 26  --  25   Glucose mg/dL 94 113*  --  107   BUN mg/dL 5* 5*  --  7   Creatinine mg/dL 0.4* 0.4*  --  0.5   Albumin g/dL 1.9* 1.9*  --  2.5*   Total Bilirubin mg/dL 0.4 0.4  --  0.5   Alkaline Phosphatase U/L 137* 151*  --  166*   AST U/L 13 11  --  19   ALT U/L 16 13  --  14   Magnesium mg/dL 1.9 1.5* 2.0 1.6   Phosphorus mg/dL 3.0 2.8  --  3.0       Drug levels (last 3 results):  No results for input(s): VANCOMYCINRA, VANCOMYCINPE, VANCOMYCINTR in  the last 72 hours.    Microbiologic Results:  Microbiology Results (last 7 days)       Procedure Component Value Units Date/Time    Aerobic culture [[id number]]  (Abnormal)  (Susceptibility) Collected: 01/13/22 1120    Order Status: Completed Specimen: Body Fluid from Gallbladder Updated: 01/16/22 0904     Aerobic Bacterial Culture KLEBSIELLA PNEUMONIAE ESBL  Moderate        ENTEROCOCCUS SPECIES  Moderate  Identification and susceptibility pending      Blood culture [[id number]] Collected: 01/12/22 1627    Order Status: Completed Specimen: Blood from Peripheral, Right Hand Updated: 01/15/22 2312     Blood Culture, Routine No Growth to date      No Growth to date      No Growth to date      No Growth to date    Blood culture [[id number]] Collected: 01/12/22 1627    Order Status: Completed Specimen: Blood from Peripheral, Left Hand Updated: 01/15/22 2312     Blood Culture, Routine No Growth to date      No Growth to date      No Growth to date      No Growth to date    Culture, Respiratory with Gram Stain [[id number]]  (Abnormal)  (Susceptibility) Collected: 01/05/22 2009    Order Status: Completed Specimen: Respiratory from Endotracheal Aspirate Updated: 01/15/22 1030     Respiratory Culture No S aureus isolated.      SERRATIA MARCESCENS  Many        KLEBSIELLA PNEUMONIAE ESBL  Moderate        PSEUDOMONAS AERUGINOSA   Moderate       Gram Stain (Respiratory) <10 epithelial cells per low power field.     Gram Stain (Respiratory) Rare WBC's     Gram Stain (Respiratory) Few Gram negative rods     Gram Stain (Respiratory) Rare yeast    Culture, Anaerobe [[id number]] Collected: 01/13/22 1120    Order Status: Completed Specimen: Body Fluid from Gallbladder Updated: 01/14/22 1441     Anaerobic Culture Culture in progress

## 2022-01-16 NOTE — SUBJECTIVE & OBJECTIVE
Interval History:  No acute events overnight afebrile.  Currently on Avycaz for ESBL Klebsiella and Serratia.  Also growing Enterococcus.  Blood Pressure is stable    Review of Systems   Unable to perform ROS: Patient nonverbal     Objective:     Vital Signs (Most Recent):  Temp: 97.16 °F (36.2 °C) (01/16/22 1123)  Pulse: 90 (01/16/22 1123)  Resp: (!) 24 (01/16/22 1123)  BP: 116/80 (01/16/22 1100)  SpO2: 99 % (01/16/22 1123) Vital Signs (24h Range):  Temp:  [96.08 °F (35.6 °C)-98.6 °F (37 °C)] 97.16 °F (36.2 °C)  Pulse:  [] 90  Resp:  [24-47] 24  SpO2:  [95 %-100 %] 99 %  BP: (114-163)/(60-93) 116/80     Weight: 71.7 kg (158 lb 1.1 oz)  Body mass index is 30.87 kg/m².    Intake/Output Summary (Last 24 hours) at 1/16/2022 1304  Last data filed at 1/16/2022 1046  Gross per 24 hour   Intake 2540.47 ml   Output 4145 ml   Net -1604.53 ml      Physical Exam  Vitals and nursing note reviewed.   Constitutional:       General: She is not in acute distress.     Appearance: She is well-developed. She is ill-appearing.      Comments: Chronic trach/vent dependent   HENT:      Head: Normocephalic and atraumatic.      Mouth/Throat:      Mouth: Mucous membranes are dry.   Cardiovascular:      Rate and Rhythm: Normal rate and regular rhythm.   Pulmonary:      Effort: Pulmonary effort is normal. No respiratory distress.      Breath sounds: Rhonchi present.   Abdominal:      General: Bowel sounds are normal.      Palpations: Abdomen is soft.      Tenderness: There is no abdominal tenderness.      Comments: g tube in place   Musculoskeletal:      Cervical back: Normal range of motion and neck supple.      Comments: Contractures to hands   Skin:     General: Skin is warm and dry.   Neurological:      GCS: GCS eye subscore is 1. GCS verbal subscore is 1. GCS motor subscore is 1.   Psychiatric:      Comments: Unable to assess         Significant Labs:   All pertinent labs within the past 24 hours have been reviewed.  BMP:   Recent  Labs   Lab 01/16/22  0345      *   K 4.7   CL 96   CO2 25   BUN 7   CREATININE 0.5   CALCIUM 9.3   MG 1.6     CBC:   Recent Labs   Lab 01/15/22  1341   WBC 5.62   HGB 9.1*   HCT 29.4*   *       Significant Imaging: I have reviewed all pertinent imaging results/findings within the past 24 hours.

## 2022-01-16 NOTE — PROGRESS NOTES
Progress Note  Infectious Disease    Reason for Consult:  Acute cholecystitis/ pneumonia    HPI: Genna Felix is a 61 y.o. female resident of [facility], with past medical history of anoxic brain injury status post tracheostomy/peg tube, prior PE, hypertension, GERD sent for tachycardia.  Patient with prior admission to [facility] for pneumonia, discharged 12/27/21 on Bactrim.  Information obtained from medical chart.  Patient is nonverbal at baseline, no family present.  In the ER, tachycardic, trach to vent.   Lab significant for WBC of 14, PMN:  87.5%  UA grossly positive, urine culture grew Proteus mirabilis likely ESBL.  Blood cultures from admission 1/4 bottles grew CoNS, likely a contaminant.  Chest x-ray right lung airspace disease c/w pneumonia.  Respiratory culture from 01/05 with multiple organisms including Serratia marcescens, ESBL Klebsiella pneumoniae, and  Pseudomonas.    Empirically started on Vancomycin and Zosyn, switched to Meropenem 1/9.     Further workup including CT scan abdomen/pelvis revealed findings c/w acute cholecystitis with 2 stones in the cystic duct.  HIDA scan suggestive of cystic duct obstruction.     Plan for IR guided percutaneous drainage on 1/13.     ID consult for acute cholecystitis and antibiotic recommendations.     INTERVAL HISTORY:   1/13:  Interim reviewed, patient status post IR guided per ирина.  Large amount of purulent bilious fluid drained, drain in place, fluid sent for cultures, in process.  Tachypneic, hypertensive, afebrile. Tracheal secretions remain thick and purulent. Repeat blood cultures 1/12 x 2 sets no growth. .    1/14: Patient seen and examined at bedside. No acute events overnight, she is trach to vent FiO2 50%, cloudy white secretions.WBC: 5, no left shift, stable. Lab called, sensitivities for Avycaz will be available later today or tomorrow morning. IR drain in place, draining purulent bilious fluid. Repeat blood cultures no growth.   "   01/15/2022 Dr Mccormack  No new eventomfortable.  Intubated.  Nonverbal., on vent 24/350/30%  procal 0.57---0.15  CRP  123.8---62.9   GB fluid GNR  01/16/2020 to Dr. Mccormack.  No new events.  Still intubated.  Cultures from gallbladder fluid are showing Enterococcus and Klebsiella ESBL.      Antibiotics (From admission, onward)            Start     Stop Route Frequency Ordered    01/16/22 1300  vancomycin 1.25 g in dextrose 5% 250 mL IVPB (ready to mix)         -- IV Every 12 hours (non-standard times) 01/16/22 1221    01/16/22 1253  vancomycin - pharmacy to dose  (vancomycin IVPB)        "And" Linked Group Details    -- IV pharmacy to manage frequency 01/16/22 1153    01/12/22 1930  cefTAZidime-avibactam (AVYCAZ) 2.5 g in dextrose 5 % 100 mL         -- IV Every 8 hours (non-standard times) 01/12/22 1757        Antifungals (From admission, onward)            None        Antivirals (From admission, onward)    None          Review of Systems:   Unable to obtain, patient is non-verbal at baseline     EXAM & DIAGNOSTICS REVIEWED:   Vitals:     Temp:  [96.08 °F (35.6 °C)-98.6 °F (37 °C)]   Temp: 96.8 °F (36 °C) (01/16/22 1529)  Pulse: 89 (01/16/22 1529)  Resp: (!) 31 (01/16/22 1529)  BP: 133/85 (01/16/22 1400)  SpO2: 99 % (01/16/22 1529)    Intake/Output Summary (Last 24 hours) at 1/16/2022 1616  Last data filed at 1/16/2022 1355  Gross per 24 hour   Intake 2665.72 ml   Output 3620 ml   Net -954.28 ml     Vent Mode: A/CMV-VC  Oxygen Concentration (%):  [30] 30  Resp Rate Total:  [24 br/min-32 br/min] 32 br/min  Vt Set:  [350 mL] 350 mL  PEEP/CPAP:  [4.9 cmH20-6.6 cmH20] 4.9 cmH20  Mean Airway Pressure:  [13.1 cmH20] 13.1 cmH20    General:  Chronically ill-appearing, trach to vent FiO2 30%, in NAD. Alert, moving head, not following commands    Eyes:  Hyperemic conjunctiva both eyes, improving, anicteric,  ENT:  Limited, moist mucus membranes, no thrush  Neck:  Supple  Lungs: Rhonchi R, L clear, thick white " secretions  Heart:  S1/S2+, regular rhythm, no murmurs  Abd:  RUQ IR drain in place, purulent bilious fluid    PEG tube in place, no evidence of redness or purulent drainage from ostomy, +BS, soft, non tender, non distended  :  [place], cloudy urine  Musc:  Flexure contractures on hands  Skin:  Warm, no rash, thickening of nails   Wound:   Neuro: Not following commands  Psych:  Calm  Lymphatic:     No cervical, supraclavicular nodes  Extrem: No LE edema b/l  VAD:         Isolation:     1/4/22:       Sacrum  Right hand    Right elbow      General Labs reviewed:  Recent Labs   Lab 01/12/22  0330 01/14/22  0339 01/15/22  1341   WBC 5.97 5.03 5.62   HGB 7.6* 7.7* 9.1*   HCT 24.3* 24.5* 29.4*    348 481*       Recent Labs   Lab 01/14/22  0339 01/14/22  0339 01/15/22  0336 01/15/22  1152 01/16/22  0345   *  --  131*  --  132*   K 3.9   < > 3.8 4.8 4.7   CL 95  --  97  --  96   CO2 27  --  26  --  25   BUN 5*  --  5*  --  7   CREATININE 0.4*  --  0.4*  --  0.5   CALCIUM 8.5*  --  8.6*  --  9.3   PROT 6.2  --  6.4  --  7.9   BILITOT 0.4  --  0.4  --  0.5   ALKPHOS 137*  --  151*  --  166*   ALT 16  --  13  --  14   AST 13  --  11  --  19    < > = values in this interval not displayed.     Recent Labs   Lab 01/12/22  1627 01/14/22  0339   .8* 62.9*     No results for input(s): SEDRATE in the last 168 hours.    Estimated Creatinine Clearance: 107.1 mL/min (based on SCr of 0.5 mg/dL).     Micro:  Microbiology Results (last 7 days)     Procedure Component Value Units Date/Time    Aerobic culture [[id number]]  (Abnormal)  (Susceptibility) Collected: 01/13/22 1120    Order Status: Completed Specimen: Body Fluid from Gallbladder Updated: 01/16/22 1344     Aerobic Bacterial Culture KLEBSIELLA PNEUMONIAE ESBL  Moderate        ENTEROCOCCUS FAECALIS  Moderate      Blood culture [[id number]] Collected: 01/12/22 1627    Order Status: Completed Specimen: Blood from Peripheral, Right Hand Updated: 01/15/22 2312     Blood  Culture, Routine No Growth to date      No Growth to date      No Growth to date      No Growth to date    Blood culture [[id number]] Collected: 01/12/22 1627    Order Status: Completed Specimen: Blood from Peripheral, Left Hand Updated: 01/15/22 2312     Blood Culture, Routine No Growth to date      No Growth to date      No Growth to date      No Growth to date    Culture, Respiratory with Gram Stain [[id number]]  (Abnormal)  (Susceptibility) Collected: 01/05/22 2009    Order Status: Completed Specimen: Respiratory from Endotracheal Aspirate Updated: 01/15/22 1030     Respiratory Culture No S aureus isolated.      SERRATIA MARCESCENS  Many        KLEBSIELLA PNEUMONIAE ESBL  Moderate        PSEUDOMONAS AERUGINOSA   Moderate       Gram Stain (Respiratory) <10 epithelial cells per low power field.     Gram Stain (Respiratory) Rare WBC's     Gram Stain (Respiratory) Few Gram negative rods     Gram Stain (Respiratory) Rare yeast    Culture, Anaerobe [[id number]] Collected: 01/13/22 1120    Order Status: Completed Specimen: Body Fluid from Gallbladder Updated: 01/14/22 1441     Anaerobic Culture Culture in progress          Imaging Reviewed:  CXRs  CT abdomen/pelvis   HIDA scan      IMPRESSION & PLAN     1.  Sepsis resolved, multifactorial; right pneumonia/UTI/acute cholecystitis s/p IR guided perc ирина 1/13   Bilious fluid cultures showing Klebsiella ESBL and Enterococcus faecalis.   Urine culture grew Proteus mirabilis likely ESBL   Blood cultures from admission 1/4 bottles grew CoNS, likely a contaminant   Respiratory culture 1/5 with multiple organisms; Serratia marcescens, ESBL Klebsiella pneumoniae, and  Pseudomonas, the latter susceptible to Avycaz   Repeat blood cultures x 2 sets no growth pending final    Procalcitonin 0.57     2.  PMHx cardiac arrest, anoxic brain injury, bed-bound, cirrhosis, GERD, nursing home resident, PE, tracheostomy and PEG since July 2020.      Recommendations:  Continue Avycaz 2.5g  IV q8h, lab called to set up plates for ESBL Klebsiella and Serratia, available 1/15   Add vancomycin          Medical Decision Making during this encounter was  [_] Low Complexity  [_] Moderate Complexity  [xx] High Complexity

## 2022-01-16 NOTE — PROGRESS NOTES
[facility] Hospital Medicine  Progress Note    Patient Name: Genna Felix  MRN: [id number]  Patient Class: IP- Inpatient   Admission Date: 1/3/2022  Length of Stay: 13 days  Attending Physician: Gabriella Allen MD  Primary Care Provider: Primary Doctor No        Subjective:     Principal Problem:Sepsis        HPI:  Genna Felix is a 61 year old female with a past medical history of anoxic brain injury s/p G tube and tracheostomy, PE, HTN, GERD who presented from [facility] for further evaluation of tachycardia.  She was discharged from this facility on 12/27/21 with a diagnosis of pneumonia with rx for bactrim.  In the ED she is found to be tachycardic with WBC 14K, lactic acid 2.6 and evidence of UTI.  CXR reveals worsening multifocal pneumonia.  Pt is nonverbal and cannot contribute to history of present illness, therefore further information is limited.  While in the ED, IV zosyn was initiated and she was give an IVF bolus.  She will be admitted to the service of hospital medicine for continued monitoring and treatment.      Overview/Hospital Course:  61-year-old female with past medical history of anoxic brain injury, PE, hypertension, GERD admitted to the hospital medicine service for UTI and worsening multifocal pneumonia.  She was placed on broad-spectrum antibiotics.  Pulmonologist was consulted.  Urine culture grew presumptive Proteus.  Sputum culture grew Pseudomonas species, Serratia species, Klebsiella pneumoniae (ESBL).  During hospital stay patient noted to have increase gastric residuals.  No definite abdominal obstruction identified.  Patient underwent a CT scan of the abdomen and pelvis which suggested acute cholecystitis for which General surgery team was consulted.  Patient underwent HIDA scan which confirm cystic duct obstruction.  General surgeon recommended percutaneous cholecystostomy drain placement which was done by Interventional Radiology on January 13, 2022.  Services  of infectious diseases specialist are being obtained who upgraded antibiotic therapy to intravenous Avycase.      Interval History:  No acute events overnight afebrile.  Currently on Avycaz for ESBL Klebsiella and Serratia.  Also growing Enterococcus.  Blood Pressure is stable    Review of Systems   Unable to perform ROS: Patient nonverbal     Objective:     Vital Signs (Most Recent):  Temp: 97.16 °F (36.2 °C) (01/16/22 1123)  Pulse: 90 (01/16/22 1123)  Resp: (!) 24 (01/16/22 1123)  BP: 116/80 (01/16/22 1100)  SpO2: 99 % (01/16/22 1123) Vital Signs (24h Range):  Temp:  [96.08 °F (35.6 °C)-98.6 °F (37 °C)] 97.16 °F (36.2 °C)  Pulse:  [] 90  Resp:  [24-47] 24  SpO2:  [95 %-100 %] 99 %  BP: (114-163)/(60-93) 116/80     Weight: 71.7 kg (158 lb 1.1 oz)  Body mass index is 30.87 kg/m².    Intake/Output Summary (Last 24 hours) at 1/16/2022 1304  Last data filed at 1/16/2022 1046  Gross per 24 hour   Intake 2540.47 ml   Output 4145 ml   Net -1604.53 ml      Physical Exam  Vitals and nursing note reviewed.   Constitutional:       General: She is not in acute distress.     Appearance: She is well-developed. She is ill-appearing.      Comments: Chronic trach/vent dependent   HENT:      Head: Normocephalic and atraumatic.      Mouth/Throat:      Mouth: Mucous membranes are dry.   Cardiovascular:      Rate and Rhythm: Normal rate and regular rhythm.   Pulmonary:      Effort: Pulmonary effort is normal. No respiratory distress.      Breath sounds: Rhonchi present.   Abdominal:      General: Bowel sounds are normal.      Palpations: Abdomen is soft.      Tenderness: There is no abdominal tenderness.      Comments: g tube in place   Musculoskeletal:      Cervical back: Normal range of motion and neck supple.      Comments: Contractures to hands   Skin:     General: Skin is warm and dry.   Neurological:      GCS: GCS eye subscore is 1. GCS verbal subscore is 1. GCS motor subscore is 1.   Psychiatric:      Comments: Unable to  assess         Significant Labs:   All pertinent labs within the past 24 hours have been reviewed.  BMP:   Recent Labs   Lab 01/16/22  0345      *   K 4.7   CL 96   CO2 25   BUN 7   CREATININE 0.5   CALCIUM 9.3   MG 1.6     CBC:   Recent Labs   Lab 01/15/22  1341   WBC 5.62   HGB 9.1*   HCT 29.4*   *       Significant Imaging: I have reviewed all pertinent imaging results/findings within the past 24 hours.      Assessment/Plan:      * Sepsis  This patient does have evidence of infective focus  My overall impression is sepsis. Vital signs were reviewed and noted in progress note.  Antibiotics given-   Antibiotics (From admission, onward)            Start     Stop Route Frequency Ordered    01/12/22 1930  cefTAZidime-avibactam (AVYCAZ) 2.5 g in dextrose 5 % 100 mL         -- IV Every 8 hours (non-standard times) 01/12/22 1757        Cultures were taken-   Microbiology Results (last 7 days)     Procedure Component Value Units Date/Time    Aerobic culture [[id number]]  (Abnormal) Collected: 01/13/22 1120    Order Status: Completed Specimen: Body Fluid from Gallbladder Updated: 01/15/22 1226     Aerobic Bacterial Culture GRAM NEGATIVE CHRISTINA  Moderate  Identification and susceptibility pending        ENTEROCOCCUS SPECIES  Moderate  Identification and susceptibility pending      Culture, Respiratory with Gram Stain [[id number]]  (Abnormal)  (Susceptibility) Collected: 01/05/22 2009    Order Status: Completed Specimen: Respiratory from Endotracheal Aspirate Updated: 01/15/22 1030     Respiratory Culture No S aureus isolated.      SERRATIA MARCESCENS  Many        KLEBSIELLA PNEUMONIAE ESBL  Moderate        PSEUDOMONAS AERUGINOSA   Moderate       Gram Stain (Respiratory) <10 epithelial cells per low power field.     Gram Stain (Respiratory) Rare WBC's     Gram Stain (Respiratory) Few Gram negative rods     Gram Stain (Respiratory) Rare yeast    Blood culture [[id number]] Collected: 01/12/22 1627    Order  Status: Completed Specimen: Blood from Peripheral, Left Hand Updated: 01/14/22 2312     Blood Culture, Routine No Growth to date      No Growth to date      No Growth to date    Blood culture [[id number]] Collected: 01/12/22 1627    Order Status: Completed Specimen: Blood from Peripheral, Right Hand Updated: 01/14/22 2312     Blood Culture, Routine No Growth to date      No Growth to date      No Growth to date    Culture, Anaerobe [[id number]] Collected: 01/13/22 1120    Order Status: Completed Specimen: Body Fluid from Gallbladder Updated: 01/14/22 1441     Anaerobic Culture Culture in progress    Blood culture x two cultures. Draw prior to antibiotics. [[id number]] Collected: 01/03/22 1950    Order Status: Completed Specimen: Blood Updated: 01/09/22 0612     Blood Culture, Routine No growth after 5 days.    Narrative:      Aerobic and anaerobic        Latest lactate reviewed, they are-  No results for input(s): LACTATE in the last 72 hours.    Organ dysfunction indicated by tachycardia, tachypnea  Source- respiratory, urine      Source control Achieved by- IV abx      Acute cholecystitis  Status post percutaneous cholecystostomy drain placement on January 13, 2022. Follow General surgery recommendations.  Continue intravenous antibiotic therapy as per ID specialist.        Hyponatremia  Follow BMP.       UTI (urinary tract infection)  IV meropenem; follow culture  Microbiology Results (last 7 days)     Procedure Component Value Units Date/Time    Aerobic culture [[id number]]  (Abnormal) Collected: 01/13/22 1120    Order Status: Completed Specimen: Body Fluid from Gallbladder Updated: 01/15/22 1226     Aerobic Bacterial Culture GRAM NEGATIVE CHRISTINA  Moderate  Identification and susceptibility pending        ENTEROCOCCUS SPECIES  Moderate  Identification and susceptibility pending      Culture, Respiratory with Gram Stain [[id number]]  (Abnormal)  (Susceptibility) Collected: 01/05/22 2009    Order Status: Completed  Specimen: Respiratory from Endotracheal Aspirate Updated: 01/15/22 1030     Respiratory Culture No S aureus isolated.      SERRATIA MARCESCENS  Many        KLEBSIELLA PNEUMONIAE ESBL  Moderate        PSEUDOMONAS AERUGINOSA   Moderate       Gram Stain (Respiratory) <10 epithelial cells per low power field.     Gram Stain (Respiratory) Rare WBC's     Gram Stain (Respiratory) Few Gram negative rods     Gram Stain (Respiratory) Rare yeast    Blood culture [[id number]] Collected: 01/12/22 1627    Order Status: Completed Specimen: Blood from Peripheral, Left Hand Updated: 01/14/22 2312     Blood Culture, Routine No Growth to date      No Growth to date      No Growth to date    Blood culture [[id number]] Collected: 01/12/22 1627    Order Status: Completed Specimen: Blood from Peripheral, Right Hand Updated: 01/14/22 2312     Blood Culture, Routine No Growth to date      No Growth to date      No Growth to date    Culture, Anaerobe [[id number]] Collected: 01/13/22 1120    Order Status: Completed Specimen: Body Fluid from Gallbladder Updated: 01/14/22 1441     Anaerobic Culture Culture in progress    Blood culture x two cultures. Draw prior to antibiotics. [[id number]] Collected: 01/03/22 1950    Order Status: Completed Specimen: Blood Updated: 01/09/22 0612     Blood Culture, Routine No growth after 5 days.    Narrative:      Aerobic and anaerobic                Pressure injury of left buttock, stage 4  Consult wound care  Turn q2h        Ventilator associated pneumonia  Failed OP abx  Pt vent dependent-consult pulmonology for vent management  On intravenous meropenem and vancomycin.  Blood and sputum cx  Nebulizer treatments        Gastroesophageal reflux disease  Chronic; utilize pepcid acutely.      Essential hypertension  Chronic, stable.  Latest blood pressure and vitals reviewed-   Temp:  [96.08 °F (35.6 °C)-98.24 °F (36.8 °C)]   Pulse:  []   Resp:  [10-75]   BP: (113-160)/(62-98)   SpO2:  [94 %-99 %] .   Home  meds for hypertension were reviewed and noted below.   No chronic antihypertensives noted. Review of home meds reveals midodrine which has been reordered.      Will utilize p.r.n. blood pressure medication only if patient's blood pressure greater than  180/110 and she develops symptoms such as worsening chest pain or shortness of breath.        Malnutrition of moderate degree  Nutrition consult.      Acute on chronic respiratory failure with hypoxia  Patient with Hypoxic Respiratory failure which is Chronic.  she is vent dependent. Supplemental oxygen was provided and noted- Vent Mode: A/CMV-VC  Oxygen Concentration (%):  [35] 35  Resp Rate Total:  [24 br/min-43 br/min] 24 br/min  Vt Set:  [350 mL] 350 mL  PEEP/CPAP:  [4.3 cmH20-8.2 cmH20] 5.3 cmH20.   Signs/symptoms of respiratory failure include- tachypnea and increased work of breathing. Contributing diagnoses includes - Aspiration and Pneumonia Labs and images were reviewed. Patient Has not had a recent ABG. Will treat underlying causes.    PEG (percutaneous endoscopic gastrostomy) status  Noted; care per nursing.  TF per nutrition recs.  Slowly advance PEG tube feeding.        Tracheostomy dependence  Noted; care per nursing      Anoxic brain injury  Noted.  Turn q2h      Anemia of chronic disease  Patient's anemia is currently controlled. S/p 0 units of PRBCs.  Current CBC reviewed-   Lab Results   Component Value Date    HGB 8.2 (L) 01/06/2022    HCT 26.9 (L) 01/06/2022    MCV 92 01/06/2022     01/06/2022     Monitor serial CBC and transfuse if patient becomes hemodynamically unstable, symptomatic or H/H drops below 7/21.           VTE Risk Mitigation (From admission, onward)         Ordered     enoxaparin injection 40 mg  Daily         01/04/22 0034     IP VTE HIGH RISK PATIENT  Once         01/04/22 0034     Place sequential compression device  Until discontinued         01/04/22 0034                Discharge Planning   [place]:      Code Status: Full  Code   Is the patient medically ready for discharge?:     Reason for patient still in hospital (select all that apply): Patient trending condition and Treatment  Discharge Plan A: Long-term acute care facility (LTAC)            Critical care time spent on the evaluation and treatment of severe organ dysfunction, review of pertinent labs and imaging studies, discussions with consulting providers and discussions with patient/family: 60 minutes.      Gabriella Allen MD  Department of Hospital Medicine   [facility]

## 2022-01-16 NOTE — CARE UPDATE
01/16/22 0719   Patient Assessment/Suction   Level of Consciousness (AVPU) alert   All Lung Fields Breath Sounds diminished   Suction Method tracheal   $ Suction Charges Inline Suction Procedure Stat Charge   Secretions Amount scant   Secretions Color white   Secretions Characteristics thin   PRE-TX-O2   O2 Device (Oxygen Therapy) ventilator   $ Is the patient on Low Flow Oxygen? Yes   Oxygen Concentration (%) 30   SpO2 99 %   Pulse Oximetry Type Continuous   $ Pulse Oximetry - Multiple Charge Pulse Oximetry - Multiple   Pulse 94   Resp (!) 24   Temp 97.52 °F (36.4 °C)   ETCO2   $ ETCO2 Usage Currently wearing   ETCO2 (mmHg) 35 mmHg   Aerosol Therapy   $ Aerosol Therapy Charges Aerosol Treatment   Respiratory Treatment Status (SVN) given   Treatment Route (SVN) in-line   Patient Position (SVN) HOB elevated   Signs of Intolerance (SVN) none   Skin Integrity   $ Wound Care Tech Time 15 min   Area Observed Neck under tracheostomy   Skin Appearance without discoloration   Barrier used? Other (see comments)  (drain sponge)        Surgical Airway Portex Cuffed   No placement date or time found.   Present Prior to Hospital Arrival?: Yes  Type: Tracheostomy  Brand: Portex  Airway Device Size: 8.0  Style: Cuffed   Status Secured   Site Assessment Clean;Dry   Ties Assessment Clean;Dry;Intact   Airway Safety   Ambu bag with the patient? Yes, Adult Ambu   Is mask with the patient? Yes, Adult Mask   Equipment Change   $ RT Equipment Inline suction catheter   Closed Suction System Changed? Yes   Closed Suction System Next Due 01/19/22   Vent Select   Conventional Vent Y   Ventilator Initiated No   $ Ventilator Subsequent 1   Charged w/in last 24h YES   Preset Conventional Ventilator Settings   Vent Type NKV-550   Ventilation Type VC   Vent Mode A/CMV-VC   Humidity HME   Set Rate 24 BPM   Vt Set 350 mL   PEEP/CPAP 6.6 cmH20   Patient Ventilator Parameters   Resp Rate Total 24 br/min   Peak Airway Pressure 34.2 cmH2O   Exhaled  Vt 348 mL   Total Ve 8.05 mL   I:E Ratio Measured 1:2.1   Inspired Tidal Volume (VTI) 350 mL   Conventional Ventilator Alarms   Alarms On Y   Apnea Rate 15   Apnea Volume (mL) 350 mL   Ready to Wean/Extubation Screen   FIO2<=50 (chart decimal) 0.3   MV<16L (chart vol.) 8.05   PEEP <=8 (chart #) 6.6   Ready to Wean Parameters   F/VT Ratio<105 (RSBI) (!) 68.97

## 2022-01-17 ENCOUNTER — OUTSIDE PLACE OF SERVICE (OUTPATIENT)
Dept: INFECTIOUS DISEASES | Facility: CLINIC | Age: 62
End: 2022-01-17
Payer: MEDICARE

## 2022-01-17 DIAGNOSIS — A41.9 SEVERE SEPSIS: ICD-10-CM

## 2022-01-17 DIAGNOSIS — R65.20 SEVERE SEPSIS: ICD-10-CM

## 2022-01-17 DIAGNOSIS — R40.3 PERSISTENT VEGETATIVE STATE: ICD-10-CM

## 2022-01-17 DIAGNOSIS — K81.1 CHOLECYSTITIS, CHRONIC: ICD-10-CM

## 2022-01-17 LAB
ALBUMIN SERPL BCP-MCNC: 2.4 G/DL (ref 3.5–5.2)
ALP SERPL-CCNC: 160 U/L (ref 55–135)
ALT SERPL W/O P-5'-P-CCNC: 14 U/L (ref 10–44)
ANION GAP SERPL CALC-SCNC: 13 MMOL/L (ref 8–16)
AST SERPL-CCNC: 16 U/L (ref 10–40)
BACTERIA BLD CULT: NORMAL
BACTERIA BLD CULT: NORMAL
BACTERIA SPEC AEROBE CULT: ABNORMAL
BACTERIA SPEC AEROBE CULT: ABNORMAL
BASOPHILS # BLD AUTO: 0.03 K/UL (ref 0–0.2)
BASOPHILS NFR BLD: 0.7 % (ref 0–1.9)
BILIRUB SERPL-MCNC: 0.5 MG/DL (ref 0.1–1)
BUN SERPL-MCNC: 7 MG/DL (ref 6–20)
CALCIUM SERPL-MCNC: 9.5 MG/DL (ref 8.7–10.5)
CHLORIDE SERPL-SCNC: 97 MMOL/L (ref 95–110)
CO2 SERPL-SCNC: 26 MMOL/L (ref 23–29)
CREAT SERPL-MCNC: 0.4 MG/DL (ref 0.5–1.4)
CRP SERPL-MCNC: 41.2 MG/L (ref 0–8.2)
DIFFERENTIAL METHOD: ABNORMAL
EOSINOPHIL # BLD AUTO: 0.4 K/UL (ref 0–0.5)
EOSINOPHIL NFR BLD: 8.3 % (ref 0–8)
ERYTHROCYTE [DISTWIDTH] IN BLOOD BY AUTOMATED COUNT: 17.6 % (ref 11.5–14.5)
EST. GFR  (AFRICAN AMERICAN): >60 ML/MIN/1.73 M^2
EST. GFR  (NON AFRICAN AMERICAN): >60 ML/MIN/1.73 M^2
GLUCOSE SERPL-MCNC: 101 MG/DL (ref 70–110)
HCT VFR BLD AUTO: 31.1 % (ref 37–48.5)
HGB BLD-MCNC: 9.2 G/DL (ref 12–16)
IMM GRANULOCYTES # BLD AUTO: 0.01 K/UL (ref 0–0.04)
IMM GRANULOCYTES NFR BLD AUTO: 0.2 % (ref 0–0.5)
LYMPHOCYTES # BLD AUTO: 1.2 K/UL (ref 1–4.8)
LYMPHOCYTES NFR BLD: 27 % (ref 18–48)
MAGNESIUM SERPL-MCNC: 1.6 MG/DL (ref 1.6–2.6)
MCH RBC QN AUTO: 27.3 PG (ref 27–31)
MCHC RBC AUTO-ENTMCNC: 29.6 G/DL (ref 32–36)
MCV RBC AUTO: 92 FL (ref 82–98)
MONOCYTES # BLD AUTO: 0.5 K/UL (ref 0.3–1)
MONOCYTES NFR BLD: 11.3 % (ref 4–15)
NEUTROPHILS # BLD AUTO: 2.4 K/UL (ref 1.8–7.7)
NEUTROPHILS NFR BLD: 52.5 % (ref 38–73)
NRBC BLD-RTO: 0 /100 WBC
PHOSPHATE SERPL-MCNC: 3.4 MG/DL (ref 2.7–4.5)
PLATELET # BLD AUTO: 474 K/UL (ref 150–450)
PMV BLD AUTO: 10.2 FL (ref 9.2–12.9)
POCT GLUCOSE: 102 MG/DL (ref 70–110)
POCT GLUCOSE: 105 MG/DL (ref 70–110)
POCT GLUCOSE: 109 MG/DL (ref 70–110)
POTASSIUM SERPL-SCNC: 4.6 MMOL/L (ref 3.5–5.1)
PROCALCITONIN SERPL IA-MCNC: 0.11 NG/ML
PROT SERPL-MCNC: 7.7 G/DL (ref 6–8.4)
RBC # BLD AUTO: 3.37 M/UL (ref 4–5.4)
SODIUM SERPL-SCNC: 136 MMOL/L (ref 136–145)
VANCOMYCIN TROUGH SERPL-MCNC: 20.1 UG/ML (ref 10–22)
WBC # BLD AUTO: 4.59 K/UL (ref 3.9–12.7)

## 2022-01-17 PROCEDURE — 99233 PR SUBSEQUENT HOSPITAL CARE,LEVL III: ICD-10-PCS | Mod: S$GLB,,, | Performed by: INTERNAL MEDICINE

## 2022-01-17 PROCEDURE — 63600175 PHARM REV CODE 636 W HCPCS: Performed by: HOSPITALIST

## 2022-01-17 PROCEDURE — 99233 SBSQ HOSP IP/OBS HIGH 50: CPT | Mod: S$GLB,,, | Performed by: INTERNAL MEDICINE

## 2022-01-17 PROCEDURE — 94003 VENT MGMT INPAT SUBQ DAY: CPT

## 2022-01-17 PROCEDURE — 25000003 PHARM REV CODE 250: Performed by: INTERNAL MEDICINE

## 2022-01-17 PROCEDURE — 83735 ASSAY OF MAGNESIUM: CPT | Performed by: NURSE PRACTITIONER

## 2022-01-17 PROCEDURE — 25000003 PHARM REV CODE 250: Performed by: NURSE PRACTITIONER

## 2022-01-17 PROCEDURE — 27000207 HC ISOLATION

## 2022-01-17 PROCEDURE — 84100 ASSAY OF PHOSPHORUS: CPT | Performed by: NURSE PRACTITIONER

## 2022-01-17 PROCEDURE — 84145 PROCALCITONIN (PCT): CPT | Performed by: INTERNAL MEDICINE

## 2022-01-17 PROCEDURE — 25000003 PHARM REV CODE 250: Performed by: STUDENT IN AN ORGANIZED HEALTH CARE EDUCATION/TRAINING PROGRAM

## 2022-01-17 PROCEDURE — 80053 COMPREHEN METABOLIC PANEL: CPT | Performed by: NURSE PRACTITIONER

## 2022-01-17 PROCEDURE — 94640 AIRWAY INHALATION TREATMENT: CPT

## 2022-01-17 PROCEDURE — 36410 VNPNXR 3YR/> PHY/QHP DX/THER: CPT

## 2022-01-17 PROCEDURE — 27000221 HC OXYGEN, UP TO 24 HOURS

## 2022-01-17 PROCEDURE — 94761 N-INVAS EAR/PLS OXIMETRY MLT: CPT

## 2022-01-17 PROCEDURE — 99233 SBSQ HOSP IP/OBS HIGH 50: CPT | Mod: ,,, | Performed by: INTERNAL MEDICINE

## 2022-01-17 PROCEDURE — 99900035 HC TECH TIME PER 15 MIN (STAT)

## 2022-01-17 PROCEDURE — 99233 PR SUBSEQUENT HOSPITAL CARE,LEVL III: ICD-10-PCS | Mod: ,,, | Performed by: INTERNAL MEDICINE

## 2022-01-17 PROCEDURE — 36415 COLL VENOUS BLD VENIPUNCTURE: CPT | Performed by: NURSE PRACTITIONER

## 2022-01-17 PROCEDURE — 63600175 PHARM REV CODE 636 W HCPCS: Performed by: NURSE PRACTITIONER

## 2022-01-17 PROCEDURE — 80202 ASSAY OF VANCOMYCIN: CPT | Performed by: INTERNAL MEDICINE

## 2022-01-17 PROCEDURE — 20000000 HC ICU ROOM

## 2022-01-17 PROCEDURE — 63600175 PHARM REV CODE 636 W HCPCS: Performed by: INTERNAL MEDICINE

## 2022-01-17 PROCEDURE — 25000003 PHARM REV CODE 250: Performed by: SURGERY

## 2022-01-17 PROCEDURE — 36415 COLL VENOUS BLD VENIPUNCTURE: CPT | Performed by: INTERNAL MEDICINE

## 2022-01-17 PROCEDURE — 86140 C-REACTIVE PROTEIN: CPT | Performed by: INTERNAL MEDICINE

## 2022-01-17 PROCEDURE — 63600175 PHARM REV CODE 636 W HCPCS: Performed by: STUDENT IN AN ORGANIZED HEALTH CARE EDUCATION/TRAINING PROGRAM

## 2022-01-17 PROCEDURE — 99900026 HC AIRWAY MAINTENANCE (STAT)

## 2022-01-17 PROCEDURE — 25000242 PHARM REV CODE 250 ALT 637 W/ HCPCS: Performed by: NURSE PRACTITIONER

## 2022-01-17 PROCEDURE — C1751 CATH, INF, PER/CENT/MIDLINE: HCPCS

## 2022-01-17 PROCEDURE — 85025 COMPLETE CBC W/AUTO DIFF WBC: CPT | Performed by: INTERNAL MEDICINE

## 2022-01-17 RX ORDER — DOXYLAMINE SUCCINATE 25 MG
TABLET ORAL 2 TIMES DAILY
Status: DISCONTINUED | OUTPATIENT
Start: 2022-01-17 | End: 2022-01-21 | Stop reason: HOSPADM

## 2022-01-17 RX ORDER — VANCOMYCIN HCL IN 5 % DEXTROSE 1G/250ML
1000 PLASTIC BAG, INJECTION (ML) INTRAVENOUS
Status: DISCONTINUED | OUTPATIENT
Start: 2022-01-17 | End: 2022-01-18 | Stop reason: SDUPTHER

## 2022-01-17 RX ADMIN — CHLORHEXIDINE GLUCONATE 0.12% ORAL RINSE 15 ML: 1.2 LIQUID ORAL at 08:01

## 2022-01-17 RX ADMIN — VANCOMYCIN HYDROCHLORIDE 1000 MG: 1 INJECTION, POWDER, LYOPHILIZED, FOR SOLUTION INTRAVENOUS at 08:01

## 2022-01-17 RX ADMIN — IPRATROPIUM BROMIDE AND ALBUTEROL SULFATE 3 ML: 2.5; .5 SOLUTION RESPIRATORY (INHALATION) at 07:01

## 2022-01-17 RX ADMIN — MIDODRINE HYDROCHLORIDE 5 MG: 5 TABLET ORAL at 03:01

## 2022-01-17 RX ADMIN — VANCOMYCIN HYDROCHLORIDE 1250 MG: 1.25 INJECTION, POWDER, LYOPHILIZED, FOR SOLUTION INTRAVENOUS at 12:01

## 2022-01-17 RX ADMIN — MIDODRINE HYDROCHLORIDE 5 MG: 5 TABLET ORAL at 08:01

## 2022-01-17 RX ADMIN — MICONAZOLE NITRATE: 20 CREAM TOPICAL at 09:01

## 2022-01-17 RX ADMIN — HYPROMELLOSE 2910 1 DROP: 5 SOLUTION OPHTHALMIC at 09:01

## 2022-01-17 RX ADMIN — HYPROMELLOSE 2910 1 DROP: 5 SOLUTION OPHTHALMIC at 03:01

## 2022-01-17 RX ADMIN — CEFTAZIDIME, AVIBACTAM 2.5 G: 2; .5 POWDER, FOR SOLUTION INTRAVENOUS at 12:01

## 2022-01-17 RX ADMIN — CEFTAZIDIME, AVIBACTAM 2.5 G: 2; .5 POWDER, FOR SOLUTION INTRAVENOUS at 06:01

## 2022-01-17 RX ADMIN — IPRATROPIUM BROMIDE AND ALBUTEROL SULFATE 3 ML: 2.5; .5 SOLUTION RESPIRATORY (INHALATION) at 11:01

## 2022-01-17 RX ADMIN — HYPROMELLOSE 2910 1 DROP: 5 SOLUTION OPHTHALMIC at 08:01

## 2022-01-17 RX ADMIN — FAMOTIDINE 20 MG: 10 INJECTION INTRAVENOUS at 08:01

## 2022-01-17 RX ADMIN — IPRATROPIUM BROMIDE AND ALBUTEROL SULFATE 3 ML: 2.5; .5 SOLUTION RESPIRATORY (INHALATION) at 12:01

## 2022-01-17 RX ADMIN — IPRATROPIUM BROMIDE AND ALBUTEROL SULFATE 3 ML: 2.5; .5 SOLUTION RESPIRATORY (INHALATION) at 04:01

## 2022-01-17 RX ADMIN — ENOXAPARIN SODIUM 40 MG: 40 INJECTION SUBCUTANEOUS at 05:01

## 2022-01-17 RX ADMIN — CEFTAZIDIME, AVIBACTAM 2.5 G: 2; .5 POWDER, FOR SOLUTION INTRAVENOUS at 03:01

## 2022-01-17 RX ADMIN — MAGNESIUM SULFATE 2 G: 2 INJECTION INTRAVENOUS at 05:01

## 2022-01-17 RX ADMIN — IPRATROPIUM BROMIDE AND ALBUTEROL SULFATE 3 ML: 2.5; .5 SOLUTION RESPIRATORY (INHALATION) at 03:01

## 2022-01-17 NOTE — PLAN OF CARE
Unable to discuss goals with patient due to level of consciousness.   Problem: Fall Injury Risk  Goal: Absence of Fall and Fall-Related Injury  Outcome: Ongoing, Progressing-safety maintained     Problem: Feeding Intolerance (Enteral Nutrition)  Goal: Feeding Tolerance  Outcome: Ongoing, Progressing- tolerates feeding well.

## 2022-01-17 NOTE — PLAN OF CARE
POC reviewed with the patient. Unable to assess level of understanding due to clinical condition. Patient remains trach/vent. White thick secretions noted. NSR noted on monitor. Temperature regulated with assistance from warming blanket. PEG intact--appears to be tolerating TF at goal at this time; minimal residuals. FWB 110cc q4hr. Biliary drain in place--15cc output this shift. Bed locked in lowest position with bed alarm set, call light within reach. Safety precautions maintained.

## 2022-01-17 NOTE — CARE UPDATE
01/17/22 0724   Patient Assessment/Suction   Level of Consciousness (AVPU) responds to voice   All Lung Fields Breath Sounds diminished   Suction Method tracheal   $ Suction Charges Inline Suction Procedure Stat Charge   Secretions Amount scant   Secretions Color white   Secretions Characteristics thick   PRE-TX-O2   O2 Device (Oxygen Therapy) ventilator   $ Is the patient on Low Flow Oxygen? Yes   Oxygen Concentration (%) 30   SpO2 98 %   Pulse Oximetry Type Continuous   $ Pulse Oximetry - Multiple Charge Pulse Oximetry - Multiple   Pulse 89   Resp (!) 24   Temp 97.34 °F (36.3 °C)   ETCO2   $ ETCO2 Usage Currently wearing   ETCO2 (mmHg) 29 mmHg   Aerosol Therapy   $ Aerosol Therapy Charges Aerosol Treatment   Respiratory Treatment Status (SVN) given   Treatment Route (SVN) in-line   Patient Position (SVN) HOB elevated   Signs of Intolerance (SVN) none   Skin Integrity   $ Wound Care Tech Time 15 min   Area Observed Neck under tracheostomy   Skin Appearance without discoloration   Barrier used? Other (see comments)  (drain sponge)        Surgical Airway Portex Cuffed   No placement date or time found.   Present Prior to Hospital Arrival?: Yes  Type: Tracheostomy  Brand: Portex  Airway Device Size: 8.0  Style: Cuffed   Status Secured   Site Assessment Clean;Dry   Ties Assessment Clean;Dry;Intact   Airway Safety   Ambu bag with the patient? Yes, Adult Ambu   Is mask with the patient? Yes, Adult Mask   Vent Select   Conventional Vent Y   Ventilator Initiated No   $ Ventilator Subsequent 1   Charged w/in last 24h YES   Preset Conventional Ventilator Settings   Vent Type NKV-550   Ventilation Type VC   Vent Mode A/CMV-VC   Humidity HME   Set Rate 24 BPM   Vt Set 350 mL   PEEP/CPAP 5.3 cmH20   Patient Ventilator Parameters   Resp Rate Total 24 br/min   Peak Airway Pressure 30.6 cmH2O   Exhaled Vt 335 mL   Total Ve 8.04 mL   I:E Ratio Measured 1:2.1   Inspired Tidal Volume (VTI) 350 mL   Conventional Ventilator Alarms    Alarms On Y   Apnea Rate 24   Apnea Volume (mL) 350 mL   Ready to Wean/Extubation Screen   FIO2<=50 (chart decimal) 0.3   MV<16L (chart vol.) 8.04   PEEP <=8 (chart #) 5.3   Ready to Wean Parameters   F/VT Ratio<105 (RSBI) (!) 71.64

## 2022-01-17 NOTE — PROGRESS NOTES
Pharmacokinetic Assessment Follow Up: IV Vancomycin    Vancomycin serum concentration assessment(s):    The trough level was drawn correctly and can be used to guide therapy at this time. The measurement is above the desired definitive target range of 15 to 20 mcg/mL.    Vancomycin Regimen Plan:    Change regimen to Vancomycin 1000 mg IV every 18 hours with next serum trough concentration measured at 1300 prior to next dose on 1/18    Drug levels (last 3 results):  Recent Labs   Lab Result Units 01/17/22  1203   Vancomycin-Trough ug/mL 20.1       Pharmacy will continue to follow and monitor vancomycin.    Please contact pharmacy at extension [phone] for questions regarding this assessment.    Thank you for the consult,   Toi Carvalho       Patient brief summary:  Genna Felix is a 61 y.o. female initiated on antimicrobial therapy with IV Vancomycin for treatment of intra-abdominal infection      Drug Allergies:   Review of patient's allergies indicates:  No Known Allergies    Actual Body Weight:   71.1    Renal Function:   Estimated Creatinine Clearance: 133.9 mL/min (A) (based on SCr of 0.4 mg/dL (L)).,     Dialysis Method (if applicable):  N/A    CBC (last 72 hours):  Recent Labs   Lab Result Units 01/15/22  1341 01/17/22  0335   WBC K/uL 5.62 4.59   Hemoglobin g/dL 9.1* 9.2*   Hematocrit % 29.4* 31.1*   Platelets K/uL 481* 474*   Gran % % 64.6 52.5   Lymph % % 19.2 27.0   Mono % % 10.3 11.3   Eosinophil % % 5.3 8.3*   Basophil % % 0.2 0.7   Differential Method  Automated Automated       Metabolic Panel (last 72 hours):  Recent Labs   Lab Result Units 01/15/22  0336 01/15/22  1152 01/16/22  0345 01/17/22  0335   Sodium mmol/L 131*  --  132* 136   Potassium mmol/L 3.8 4.8 4.7 4.6   Chloride mmol/L 97  --  96 97   CO2 mmol/L 26  --  25 26   Glucose mg/dL 113*  --  107 101   BUN mg/dL 5*  --  7 7   Creatinine mg/dL 0.4*  --  0.5 0.4*   Albumin g/dL 1.9*  --  2.5* 2.4*   Total Bilirubin mg/dL 0.4  --  0.5 0.5    Alkaline Phosphatase U/L 151*  --  166* 160*   AST U/L 11  --  19 16   ALT U/L 13  --  14 14   Magnesium mg/dL 1.5* 2.0 1.6 1.6   Phosphorus mg/dL 2.8  --  3.0 3.4       Vancomycin Administrations:  vancomycin given in the last 96 hours                     vancomycin 1.25 g in dextrose 5% 250 mL IVPB (ready to mix) ()  Restarted 01/17/22 0214     1,250 mg New Bag  0052     1,250 mg New Bag 01/16/22 1353                    Microbiologic Results:  Microbiology Results (last 7 days)       Procedure Component Value Units Date/Time    Aerobic culture [[id number]]  (Abnormal)  (Susceptibility) Collected: 01/13/22 1120    Order Status: Completed Specimen: Body Fluid from Gallbladder Updated: 01/17/22 1010     Aerobic Bacterial Culture KLEBSIELLA PNEUMONIAE ESBL  Moderate        ENTEROCOCCUS FAECALIS  Moderate      Blood culture [[id number]] Collected: 01/12/22 1627    Order Status: Completed Specimen: Blood from Peripheral, Right Hand Updated: 01/16/22 2312     Blood Culture, Routine No Growth to date      No Growth to date      No Growth to date      No Growth to date      No Growth to date    Blood culture [[id number]] Collected: 01/12/22 1627    Order Status: Completed Specimen: Blood from Peripheral, Left Hand Updated: 01/16/22 2312     Blood Culture, Routine No Growth to date      No Growth to date      No Growth to date      No Growth to date      No Growth to date    Culture, Respiratory with Gram Stain [[id number]]  (Abnormal)  (Susceptibility) Collected: 01/05/22 2009    Order Status: Completed Specimen: Respiratory from Endotracheal Aspirate Updated: 01/15/22 1030     Respiratory Culture No S aureus isolated.      SERRATIA MARCESCENS  Many        KLEBSIELLA PNEUMONIAE ESBL  Moderate        PSEUDOMONAS AERUGINOSA   Moderate       Gram Stain (Respiratory) <10 epithelial cells per low power field.     Gram Stain (Respiratory) Rare WBC's     Gram Stain (Respiratory) Few Gram negative rods     Gram Stain  (Respiratory) Rare yeast    Culture, Anaerobe [[id number]] Collected: 01/13/22 1120    Order Status: Completed Specimen: Body Fluid from Gallbladder Updated: 01/14/22 1441     Anaerobic Culture Culture in progress

## 2022-01-17 NOTE — SUBJECTIVE & OBJECTIVE
Interval History:  White count stable afebrile H&H stable sodium 136 today CRP trending down procalcitonin negative.  Currently on vanc and avycaz    Review of Systems   Unable to perform ROS: Patient nonverbal     Objective:     Vital Signs (Most Recent):  Temp: 97.34 °F (36.3 °C) (01/17/22 0724)  Pulse: 89 (01/17/22 0724)  Resp: (!) 24 (01/17/22 0724)  BP: (!) 103/53 (01/17/22 0600)  SpO2: 98 % (01/17/22 0724) Vital Signs (24h Range):  Temp:  [96.8 °F (36 °C)-97.52 °F (36.4 °C)] 97.34 °F (36.3 °C)  Pulse:  [] 89  Resp:  [24-79] 24  SpO2:  [94 %-100 %] 98 %  BP: (103-164)/(53-88) 103/53     Weight: 71.7 kg (158 lb 1.1 oz)  Body mass index is 30.87 kg/m².    Intake/Output Summary (Last 24 hours) at 1/17/2022 1105  Last data filed at 1/17/2022 0610  Gross per 24 hour   Intake 2595.82 ml   Output 1565 ml   Net 1030.82 ml      Physical Exam  Vitals and nursing note reviewed.   Constitutional:       General: She is not in acute distress.     Appearance: She is well-developed. She is ill-appearing.      Comments: Chronic trach/vent dependent   HENT:      Head: Normocephalic and atraumatic.      Mouth/Throat:      Mouth: Mucous membranes are dry.   Cardiovascular:      Rate and Rhythm: Normal rate and regular rhythm.   Pulmonary:      Effort: Pulmonary effort is normal. No respiratory distress.      Breath sounds: Rhonchi present.   Abdominal:      General: Bowel sounds are normal.      Palpations: Abdomen is soft.      Tenderness: There is no abdominal tenderness.      Comments: g tube in place   Musculoskeletal:      Cervical back: Normal range of motion and neck supple.      Comments: Contractures to hands   Skin:     General: Skin is warm and dry.   Neurological:      General: No focal deficit present.      GCS: GCS eye subscore is 1. GCS verbal subscore is 1. GCS motor subscore is 1.   Psychiatric:      Comments: Unable to assess         Significant Labs:   All pertinent labs within the past 24 hours have been  reviewed.  BMP:   Recent Labs   Lab 01/17/22  0335         K 4.6   CL 97   CO2 26   BUN 7   CREATININE 0.4*   CALCIUM 9.5   MG 1.6     CBC:   Recent Labs   Lab 01/15/22  1341 01/17/22  0335   WBC 5.62 4.59   HGB 9.1* 9.2*   HCT 29.4* 31.1*   * 474*       Significant Imaging: I have reviewed all pertinent imaging results/findings within the past 24 hours.

## 2022-01-17 NOTE — PLAN OF CARE
Intervention: enteral nutrition therapy, collaboration with care providers      Recommendation:  1) Continue TF as able - same as TF PTA   Isosource 1.5 @ 50 ml/hr + 110 ml flush q 4 hr + beneprotein BID +Benny BID   ( provides 1800 kcal ( > 100% EEN), 81 g protein + bene ( 94% EPN), 912 ml free water)     2) If pt has trouble tolerating TF consider PPN  D 5 AA 4.25 @ 75 ml/hr + standard lipids ( 1112 kcal, 76 g protein)     3) weigh weekly, replete lytes as needed     Goals: 1) TF advanced in < 4 days 2) TF meeting > 50% of needs at f/u  Nutrition Goal Status: was meeting/ meeting-continues  Communication of RD Recs: POC, sticky note

## 2022-01-17 NOTE — ASSESSMENT & PLAN NOTE
IV meropenem; follow culture  Microbiology Results (last 7 days)     Procedure Component Value Units Date/Time    Aerobic culture [[id number]]  (Abnormal)  (Susceptibility) Collected: 01/13/22 1120    Order Status: Completed Specimen: Body Fluid from Gallbladder Updated: 01/17/22 1010     Aerobic Bacterial Culture KLEBSIELLA PNEUMONIAE ESBL  Moderate        ENTEROCOCCUS FAECALIS  Moderate      Blood culture [[id number]] Collected: 01/12/22 1627    Order Status: Completed Specimen: Blood from Peripheral, Right Hand Updated: 01/16/22 2312     Blood Culture, Routine No Growth to date      No Growth to date      No Growth to date      No Growth to date      No Growth to date    Blood culture [[id number]] Collected: 01/12/22 1627    Order Status: Completed Specimen: Blood from Peripheral, Left Hand Updated: 01/16/22 2312     Blood Culture, Routine No Growth to date      No Growth to date      No Growth to date      No Growth to date      No Growth to date    Culture, Respiratory with Gram Stain [[id number]]  (Abnormal)  (Susceptibility) Collected: 01/05/22 2009    Order Status: Completed Specimen: Respiratory from Endotracheal Aspirate Updated: 01/15/22 1030     Respiratory Culture No S aureus isolated.      SERRATIA MARCESCENS  Many        KLEBSIELLA PNEUMONIAE ESBL  Moderate        PSEUDOMONAS AERUGINOSA   Moderate       Gram Stain (Respiratory) <10 epithelial cells per low power field.     Gram Stain (Respiratory) Rare WBC's     Gram Stain (Respiratory) Few Gram negative rods     Gram Stain (Respiratory) Rare yeast    Culture, Anaerobe [[id number]] Collected: 01/13/22 1120    Order Status: Completed Specimen: Body Fluid from Gallbladder Updated: 01/14/22 1441     Anaerobic Culture Culture in progress

## 2022-01-17 NOTE — PROGRESS NOTES
[facility]  Adult Nutrition  Progress Note    SUMMARY     Intervention: enteral nutrition therapy, collaboration with care providers      Recommendation:  1) Continue TF as able - same as TF PTA   Isosource 1.5 @ 50 ml/hr + 110 ml flush q 4 hr + beneprotein BID +Benny BID   ( provides 1800 kcal ( > 100% EEN), 81 g protein + bene ( 94% EPN), 912 ml free water)     2) If pt has trouble tolerating TF consider PPN  D 5 AA 4.25 @ 75 ml/hr + standard lipids ( 1112 kcal, 76 g protein)     3) weigh weekly, replete lytes as needed     Goals: 1) TF advanced in < 4 days 2) TF meeting > 50% of needs at f/u  Nutrition Goal Status: was meeting/ meeting-continues  Communication of RD Recs: POC, sticky note     Assessment and Plan     Inadequate energy/protein intake  R/t NPO, emesis  As evidenced by inadequate TF infustion x at least 2-3 days, stage 4 coccyx wound   Intervention: above  resolving     Malnutrition Assessment  Skin (Micronutrient):  (Hardik = 10 , stage 4 coccyx wound, elbow wound)  Teeth (Micronutrient):  (WDL)   Micronutrient Evaluation: suspected deficiency  Micronutrient Evaluation Comments:Low Na, P, Mg, Creatinine, Albumin, check iron, check vitamin C and zinc              Edema (Fluid Accumulation): 2+  Subcutaneous Fat Loss (Final Summary): well nourished  Muscle Loss Evaluation (Final Summary): well nourished       Reason for Assessment     Reason For Assessment: follow up  Diagnosis:  sepsis  Relevant Medical History: recurrent pneumonia/ UTI, [place] resident, HTN, GERD, PEG/trach, anoxic brain injury, cardiac arrest  Interdisciplinary Rounds: none today     General Information Comments: 62 y/o female admitted with sepsis and pneumonia s/p emesis x 2 days PTA. Has trach and PEG, was receiving Isosource 1.5 @ 50 ml/hr + 250 ml flush q 6 hr at [place] + arginaid packet BID. NFPE done 1/4/21 no wasting seen. NPO x 1 day inpatient. Wt fluctuations x 1 year.  1/10/21 Pt restarted at 20  ml/hr , was tolerating TF @ goal yesterday. This morning suctioned contents appeared to be TF, so TF was held and plan for KUB before restarting.  22 P/t still off TF, plan to advance TF sometime today (22). Pt had large residual 1/10. Possible cholecystitis ordering HIDA scan. P/t still has mechanical ventilator @ trachea.  21 Pt tolerating TF @ goal. Minimal residuals.     Nutrition Discharge Planning: To be determined- TF PTA Isosource 1.5 @ 20 ml/hr advancing to goal of 50 ml/hr + 250 ml flush q 6 hr + Benny or arginaid BID     Nutrition Risk Screen     Nutrition Risk Screen: tube feeding or parenteral nutrition,dysphagia or difficulty swallowing     Nutrition/Diet History     Patient Reported Diet/Restrictions/Preferences: no oral intake  Spiritual, Cultural Beliefs, Protestant Practices, Values that Affect Care: no  Food Allergies: NKFA  Factors Affecting Nutritional Intake: NPO,difficulty/impaired swallowing     Anthropometrics  Height Method: Stated  Height: 5'  Height (inches): 60 in  Weight Method: Bed Scale  Weight: 71.7 kg , 76.6 kg 22, 72.3 kg 1/10/21, 65.7 kg 21,   Weight (lb): 158 lb ( edema)  Ideal Body Weight (IBW), Female: 100 lb  BMI (Calculated): 28 kg/m2 admission  BMI Grade: 25 - 29.9 - overweight  Weight Loss: unintentional  Usual Body Weight (UBW), k.1 kg (20)  59.4 kg (10/25/21), 62.3 kg 21     Lab/Procedures/Meds     Pertinent Labs Reviewed: reviewed  BMP  Lab Results   Component Value Date     2022    K 4.6 2022    CL 97 2022    CO2 26 2022    BUN 7 2022    CREATININE 0.4 (L) 2022    CALCIUM 9.5 2022    ANIONGAP 13 2022    ESTGFRAFRICA >60 2022    EGFRNONAA >60 2022     Recent Labs   Lab 22  1201   POCTGLUCOSE 109     Lab Results   Component Value Date    CALCIUM 9.5 2022    PHOS 3.4 2022          Pertinent Medications Reviewed: reviewed  NS 1-35 ml/hr, Ca, Mg  sulfate, zofran, KCl, polyethylene glycol, Kbicarb, KNaPhos    Estimated/Assessed Needs     Weight Used For Calorie Calculations: 65.7 kg  Energy Calorie Requirements (kcal): MSJ ( x 1.25) = 4759-4379 kcal  Energy Need Method: [place]  Protein Requirements: 1.5 g protein/kg ( wound/ICU) = 98 g  Weight Used For Protein Calculations: 65.7 kg  Fluid Requirements (mL): 1400 ml or per MD  Estimated Fluid Requirement Method: RDA Method  CHO Requirement: N/A        Nutrition Prescription Ordered     Current Diet Order: TF above, NPO     Evaluation of Received Nutrient/Fluid Intake     Energy Calories Required: meeting needs  Protein Required: meeting needs  Fluid Required: meeting needs  Tolerance: tolerating  % Intake of Estimated Energy Needs: 100%  % Meal Intake:NPO + TF above        Nutrition Risk     Level of Risk/Frequency of Follow-up: moderate 2 x weekly        Monitor and Evaluation     Food and Nutrient Intake: energy intake  Food and Nutrient Adminstration: enteral and parenteral nutrition administration  Anthropometric Measurements: weight  Biochemical Data, Medical Tests and Procedures: electrolyte and renal panel,gastrointestinal profile,glucose/endocrine profile  Nutrition-Focused Physical Findings: overall appearance         Nutrition Follow-Up     RD Follow-up?: Yes

## 2022-01-17 NOTE — PROGRESS NOTES
Progress Note  Infectious Disease    Reason for Consult:  Acute cholecystitis/ pneumonia    HPI: Genna Felix is a 61 y.o. female resident of [facility], with past medical history of anoxic brain injury status post tracheostomy/peg tube, prior PE, hypertension, GERD sent for tachycardia.  Patient with prior admission to [facility] for pneumonia, discharged 12/27/21 on Bactrim.  Information obtained from medical chart.  Patient is nonverbal at baseline, no family present.  In the ER, tachycardic, trach to vent.   Lab significant for WBC of 14, PMN:  87.5%  UA grossly positive, urine culture grew Proteus mirabilis likely ESBL.  Blood cultures from admission 1/4 bottles grew CoNS, likely a contaminant.  Chest x-ray right lung airspace disease c/w pneumonia.  Respiratory culture from 01/05 with multiple organisms including Serratia marcescens, ESBL Klebsiella pneumoniae, and  Pseudomonas.    Empirically started on Vancomycin and Zosyn, switched to Meropenem 1/9.     Further workup including CT scan abdomen/pelvis revealed findings c/w acute cholecystitis with 2 stones in the cystic duct.  HIDA scan suggestive of cystic duct obstruction.     Plan for IR guided percutaneous drainage on 1/13.     ID consult for acute cholecystitis and antibiotic recommendations.     INTERVAL HISTORY:   1/13:  Interim reviewed, patient status post IR guided per ирина.  Large amount of purulent bilious fluid drained, drain in place, fluid sent for cultures, in process.  Tachypneic, hypertensive, afebrile. Tracheal secretions remain thick and purulent. Repeat blood cultures 1/12 x 2 sets no growth. .    1/14: Patient seen and examined at bedside. No acute events overnight, she is trach to vent FiO2 50%, cloudy white secretions.WBC: 5, no left shift, stable. Lab called, sensitivities for Avycaz will be available later today or tomorrow morning. IR drain in place, draining purulent bilious fluid. Repeat blood cultures no growth.   "   01/15/2022 Dr Mccormack  No new eventomfortable.  Intubated.  Nonverbal., on vent 24/350/30%  procal 0.57---0.15  CRP  123.8---62.9   GB fluid GNR  01/16/2020 to Dr. Mccormack.  No new events.  Still intubated.  Cultures from gallbladder fluid are showing Enterococcus and Klebsiella ESBL.    1/17(Chris) consult reviewed and d/w Cassidy Schneider and Ksenia.  Afebrile, ventilator needs unchanged, no pressors, urine output not recorded for the last 24+hours.  Procalcitonin normal, CRP down to 41, white blood cells been normal for several days, chest x-ray is improving. Drainage from cholecystostomy not particularly purulent.     Antibiotics (From admission, onward)            Start     Stop Route Frequency Ordered    01/17/22 2000  vancomycin in dextrose 5 % 1 gram/250 mL IVPB 1,000 mg         -- IV Every 18 hours 01/17/22 1351    01/16/22 1253  vancomycin - pharmacy to dose  (vancomycin IVPB)        "And" Linked Group Details    -- IV pharmacy to manage frequency 01/16/22 1153    01/12/22 1930  cefTAZidime-avibactam (AVYCAZ) 2.5 g in dextrose 5 % 100 mL         -- IV Every 8 hours (non-standard times) 01/12/22 1757        Antifungals (From admission, onward)            None             EXAM & DIAGNOSTICS REVIEWED:   Vitals:     Temp:  [96.8 °F (36 °C)-99.32 °F (37.4 °C)]   Temp: 98.78 °F (37.1 °C) (01/17/22 1630)  Pulse: 102 (01/17/22 1630)  Resp: (!) 25 (01/17/22 1630)  BP: 113/65 (01/17/22 1600)  SpO2: 100 % (01/17/22 1630)    Intake/Output Summary (Last 24 hours) at 1/17/2022 1717  Last data filed at 1/17/2022 0800  Gross per 24 hour   Intake 2360.57 ml   Output 815 ml   Net 1545.57 ml     Vent Mode: A/CMV-VC  Oxygen Concentration (%):  [30] 30  Resp Rate Total:  [24 br/min-45 br/min] 26 br/min  Vt Set:  [350 mL] 350 mL  PEEP/CPAP:  [5 cmH20-6.8 cmH20] 5.8 cmH20    General:  Chronically ill-appearing, trach to vent FiO2 30%, in NAD. Alert, moving head, not following commands    Eyes:  Hyperemic conjunctiva both eyes, , " anicteric, moves head and gets nystagmus whenever stimulated  ENT:  Limited, moist mucus membranes, no thrush  Neck:  Supple  Lungs: Mild right basilar crackles  Heart:  S1/S2+, regular rhythm, no murmurs  Abd:  RUQ IR drain in place,   bilious fluid    PEG tube in place, no evidence of redness or purulent drainage from ostomy, +BS, soft, non tender, non distended  :  [place], cloudy urine  Musc:  Flexure contractures on hands  Skin:  Incredible tinea pedis with sloughing of skin  Wound:   Neuro: Not following commands  Psych:  Easily agitated  Lymphatic:      Extrem: No LE edema b/l  VAD:         Isolation:     1/4/22:       Sacrum  Right hand    Right elbow      General Labs reviewed:  Recent Labs   Lab 01/14/22  0339 01/15/22  1341 01/17/22  0335   WBC 5.03 5.62 4.59   HGB 7.7* 9.1* 9.2*   HCT 24.5* 29.4* 31.1*    481* 474*       Recent Labs   Lab 01/15/22  0336 01/15/22  0336 01/15/22  1152 01/16/22  0345 01/17/22  0335   *  --   --  132* 136   K 3.8   < > 4.8 4.7 4.6   CL 97  --   --  96 97   CO2 26  --   --  25 26   BUN 5*  --   --  7 7   CREATININE 0.4*  --   --  0.5 0.4*   CALCIUM 8.6*  --   --  9.3 9.5   PROT 6.4  --   --  7.9 7.7   BILITOT 0.4  --   --  0.5 0.5   ALKPHOS 151*  --   --  166* 160*   ALT 13  --   --  14 14   AST 11  --   --  19 16    < > = values in this interval not displayed.     Recent Labs   Lab 01/12/22  1627 01/14/22  0339 01/17/22 0335   .8* 62.9* 41.2*     No results for input(s): SEDRATE in the last 168 hours.    Estimated Creatinine Clearance: 133.9 mL/min (A) (based on SCr of 0.4 mg/dL (L)).     Micro:  Microbiology Results (last 7 days)     Procedure Component Value Units Date/Time    Aerobic culture [[id number]]  (Abnormal)  (Susceptibility) Collected: 01/13/22 1120    Order Status: Completed Specimen: Body Fluid from Gallbladder Updated: 01/17/22 1010     Aerobic Bacterial Culture KLEBSIELLA PNEUMONIAE ESBL  Moderate        ENTEROCOCCUS FAECALIS  Moderate       Blood culture [[id number]] Collected: 01/12/22 1627    Order Status: Completed Specimen: Blood from Peripheral, Right Hand Updated: 01/16/22 2312     Blood Culture, Routine No Growth to date      No Growth to date      No Growth to date      No Growth to date      No Growth to date    Blood culture [[id number]] Collected: 01/12/22 1627    Order Status: Completed Specimen: Blood from Peripheral, Left Hand Updated: 01/16/22 2312     Blood Culture, Routine No Growth to date      No Growth to date      No Growth to date      No Growth to date      No Growth to date    Culture, Respiratory with Gram Stain [[id number]]  (Abnormal)  (Susceptibility) Collected: 01/05/22 2009    Order Status: Completed Specimen: Respiratory from Endotracheal Aspirate Updated: 01/15/22 1030     Respiratory Culture No S aureus isolated.      SERRATIA MARCESCENS  Many        KLEBSIELLA PNEUMONIAE ESBL  Moderate        PSEUDOMONAS AERUGINOSA   Moderate       Gram Stain (Respiratory) <10 epithelial cells per low power field.     Gram Stain (Respiratory) Rare WBC's     Gram Stain (Respiratory) Few Gram negative rods     Gram Stain (Respiratory) Rare yeast    Culture, Anaerobe [[id number]] Collected: 01/13/22 1120    Order Status: Completed Specimen: Body Fluid from Gallbladder Updated: 01/14/22 1441     Anaerobic Culture Culture in progress          Imaging Reviewed:  CXRs  CT abdomen/pelvis   HIDA scan      IMPRESSION & PLAN     1.  Sepsis resolved, multifactorial; right pneumonia/UTI/acute cholecystitis s/p IR guided perc ирина 1/13   Bilious fluid cultures showing Klebsiella ESBL and Enterococcus faecalis.   Urine culture grew Proteus mirabilis likely ESBL   Blood cultures from admission 1/4 bottles grew CoNS, likely a contaminant   Respiratory culture 1/5 with multiple organisms; Serratia marcescens, ESBL Klebsiella pneumoniae, and  Pseudomonas, the latter susceptible to Avycaz, started 1/12   Repeat blood cultures x 2 sets no growth  pending final    Procalcitonin 0.57     2.  PMHx cardiac arrest, anoxic brain injury, bed-bound, cirrhosis, GERD, nursing home resident, PE, tracheostomy and PEG since July 2020.      Recommendations:  Continue Avycaz 2.5g IV q8h, lab called to set up plates for ESBL Klebsiella and Serratia, available 1/15   Add vancomycin for the enterococcus  Duration probably through end of this week  Repeat CXR  Topical treatment for tinea pedis  Back to NH with cholecystostomy, repeat cholangiogram as outpatient  6 weeks. If cystic duct patent, remove tube, and if not, leave tube in indefinitely(per surgery)    D/w nursing and gen surgery    Medical Decision Making during this encounter was  [_] Low Complexity  [_] Moderate Complexity  [xx] High Complexity

## 2022-01-17 NOTE — PROGRESS NOTES
Progress Note  PULMONARY    Admit Date: 1/3/2022   01/17/2022  From Dr Montesinos's consult:  History of Present Illness:  Pt is a 60 yo female with anoxic brain injury, chronic respiratory failure with vent dependence, PEG, cirrhosis, GERD, HTN, seizure disorder, resident of [facility] who presented to the ED for evaluation of tachycardia. Pt is admitted to the ICU with sepsis and pneumonia. She is requiring precedex drip for agitation/tachypnea. She is receiving fluid resuscitation and vanco/zosyn for pneumonia.    SUBJECTIVE:     1/5- no new issues  1/6- no new issues  1/7- remains on vent, GNR on sputum culture, CXR better. Per RN multiple episodes emesis so tube feeds are held  1/8- no new issues  1/9- no new issues  1/10- no new issues  1/11- yesterday had emesis, aspiration event. abg looks ok. Remains on vent  1/12- no new issues. Per nurse there is plan for cholecystectomy?  1/13- remains on vent, afebrile, vs stable. S/p IR PCT this am  1/14 no c/o on vent  1/15- no new issues, continues on vent, tolerating tube feeds    OBJECTIVE:     Vitals (Most recent):  Vitals:    01/17/22 0724   BP:    Pulse: 89   Resp: (!) 24   Temp: 97.34 °F (36.3 °C)       Vitals (24 hour range):  Temp:  [96.8 °F (36 °C)-97.52 °F (36.4 °C)]   Pulse:  []   Resp:  [24-79]   BP: (103-164)/(53-88)   SpO2:  [94 %-100 %]       Intake/Output Summary (Last 24 hours) at 1/17/2022 1028  Last data filed at 1/17/2022 0610  Gross per 24 hour   Intake 2595.82 ml   Output 2190 ml   Net 405.82 ml          Physical Exam:  The patient's neuro status (alertness,orientation,cognitive function,motor skills,), pharyngeal exam (oral lesions, hygiene, abn dentition,), Neck (jvd,mass,thyroid,nodes in neck and above/below clavicle),RESPIRATORY(symmetry,effort,fremitus,percussion,auscultation),  Cor(rhythm,heart tones including gallops,perfusion,edema)ABD(distention,hepatic&splenomegaly,tenderness,masses),  Skin(rash,cyanosis),Psyc(affect,judgement,).  Exam negative except for these pertinent findings:     opens eyes  Trach w vent  Bilateral coarse breath sounds  Abdomen soft, distended, hypoactive bowel sounds, PEG in place  Percutaneous ángel drain in place, thick yellow material in bag  Contracted upper ext  +edema upper and lower extremities           Radiographs reviewed: view by direct vision   HIDA scan 1/11-   1. Findings are suggestive of cystic duct obstruction.  2. Common bile duct is patent.  CXR 1/9- worsened appearance R mid/lower lung infiltrates  CXR 1/7- improved infiltrates  CXR 1/3/22- bilateral lower lobe infiltrates, worsened compared to previous film    Labs     Recent Labs   Lab 01/17/22  0335   WBC 4.59   HGB 9.2*   HCT 31.1*   *     Recent Labs   Lab 01/17/22  0335      K 4.6   CL 97   CO2 26   BUN 7   CREATININE 0.4*      CALCIUM 9.5   MG 1.6   PHOS 3.4   AST 16   ALT 14   ALKPHOS 160*   BILITOT 0.5   PROT 7.7   ALBUMIN 2.4*   CRP 41.2*   PROCAL 0.11     No results for input(s): PH, PCO2, PO2, HCO3 in the last 24 hours.  Microbiology Results (last 7 days)     Procedure Component Value Units Date/Time    Aerobic culture [[id number]]  (Abnormal)  (Susceptibility) Collected: 01/13/22 1120    Order Status: Completed Specimen: Body Fluid from Gallbladder Updated: 01/17/22 1010     Aerobic Bacterial Culture KLEBSIELLA PNEUMONIAE ESBL  Moderate        ENTEROCOCCUS FAECALIS  Moderate      Blood culture [[id number]] Collected: 01/12/22 1627    Order Status: Completed Specimen: Blood from Peripheral, Right Hand Updated: 01/16/22 2312     Blood Culture, Routine No Growth to date      No Growth to date      No Growth to date      No Growth to date      No Growth to date    Blood culture [[id number]] Collected: 01/12/22 1627    Order Status: Completed Specimen: Blood from Peripheral, Left Hand Updated: 01/16/22 2312     Blood Culture, Routine No Growth to date      No Growth to date       No Growth to date      No Growth to date      No Growth to date    Culture, Respiratory with Gram Stain [[id number]]  (Abnormal)  (Susceptibility) Collected: 01/05/22 2009    Order Status: Completed Specimen: Respiratory from Endotracheal Aspirate Updated: 01/15/22 1030     Respiratory Culture No S aureus isolated.      SERRATIA MARCESCENS  Many        KLEBSIELLA PNEUMONIAE ESBL  Moderate        PSEUDOMONAS AERUGINOSA   Moderate       Gram Stain (Respiratory) <10 epithelial cells per low power field.     Gram Stain (Respiratory) Rare WBC's     Gram Stain (Respiratory) Few Gram negative rods     Gram Stain (Respiratory) Rare yeast    Culture, Anaerobe [[id number]] Collected: 01/13/22 1120    Order Status: Completed Specimen: Body Fluid from Gallbladder Updated: 01/14/22 1441     Anaerobic Culture Culture in progress          Impression:  Active Hospital Problems    Diagnosis  POA    *Sepsis [A41.9]  Yes    Acute cholecystitis [K81.0]  No    Hyponatremia [E87.1]  Yes    UTI (urinary tract infection) [N39.0]  Yes    Ventilator associated pneumonia [J95.851]  Yes    Essential hypertension [I10]  Yes    Gastroesophageal reflux disease [K21.9]  Yes    Malnutrition of moderate degree [E44.0]  Yes    Acute on chronic respiratory failure with hypoxia [J96.21]  Yes    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  Added automatically from request for surgery [id number]        Resolved Hospital Problems   No resolved problems to display.               Plan:     - continue vent, no weaning  - lovenox subq for DVT prophylaxis  - pepcid for GI prophylaxis  - wound care   - continue midodrine  - monitor Hgb  - tube feeds  - surgery following -  s/p IR PCT  - antibiotics per ID    Dora Montesinos MD  Pulmonary & Critical Care Medicine       87.1

## 2022-01-17 NOTE — PROGRESS NOTES
[facility] Hospital Medicine  Progress Note    Patient Name: Genna Felix  MRN: [id number]  Patient Class: IP- Inpatient   Admission Date: 1/3/2022  Length of Stay: 14 days  Attending Physician: Gabriella Allen MD  Primary Care Provider: Primary Doctor No        Subjective:     Principal Problem:Sepsis        HPI:  Genna Felix is a 61 year old female with a past medical history of anoxic brain injury s/p G tube and tracheostomy, PE, HTN, GERD who presented from [facility] for further evaluation of tachycardia.  She was discharged from this facility on 12/27/21 with a diagnosis of pneumonia with rx for bactrim.  In the ED she is found to be tachycardic with WBC 14K, lactic acid 2.6 and evidence of UTI.  CXR reveals worsening multifocal pneumonia.  Pt is nonverbal and cannot contribute to history of present illness, therefore further information is limited.  While in the ED, IV zosyn was initiated and she was give an IVF bolus.  She will be admitted to the service of hospital medicine for continued monitoring and treatment.      Overview/Hospital Course:  61-year-old female with past medical history of anoxic brain injury, PE, hypertension, GERD admitted to the hospital medicine service for UTI and worsening multifocal pneumonia.  She was placed on broad-spectrum antibiotics.  Pulmonologist was consulted.  Urine culture grew presumptive Proteus.  Sputum culture grew Pseudomonas species, Serratia species, Klebsiella pneumoniae (ESBL).  During hospital stay patient noted to have increase gastric residuals.  No definite abdominal obstruction identified.  Patient underwent a CT scan of the abdomen and pelvis which suggested acute cholecystitis for which General surgery team was consulted.  Patient underwent HIDA scan which confirm cystic duct obstruction.  General surgeon recommended percutaneous cholecystostomy drain placement which was done by Interventional Radiology on January 13, 2022.  Services  of infectious diseases specialist are being obtained who upgraded antibiotic therapy to intravenous Avycase.      Interval History:  White count stable afebrile H&H stable sodium 136 today CRP trending down procalcitonin negative.  Currently on vanc and avycaz    Review of Systems   Unable to perform ROS: Patient nonverbal     Objective:     Vital Signs (Most Recent):  Temp: 97.34 °F (36.3 °C) (01/17/22 0724)  Pulse: 89 (01/17/22 0724)  Resp: (!) 24 (01/17/22 0724)  BP: (!) 103/53 (01/17/22 0600)  SpO2: 98 % (01/17/22 0724) Vital Signs (24h Range):  Temp:  [96.8 °F (36 °C)-97.52 °F (36.4 °C)] 97.34 °F (36.3 °C)  Pulse:  [] 89  Resp:  [24-79] 24  SpO2:  [94 %-100 %] 98 %  BP: (103-164)/(53-88) 103/53     Weight: 71.7 kg (158 lb 1.1 oz)  Body mass index is 30.87 kg/m².    Intake/Output Summary (Last 24 hours) at 1/17/2022 1105  Last data filed at 1/17/2022 0610  Gross per 24 hour   Intake 2595.82 ml   Output 1565 ml   Net 1030.82 ml      Physical Exam  Vitals and nursing note reviewed.   Constitutional:       General: She is not in acute distress.     Appearance: She is well-developed. She is ill-appearing.      Comments: Chronic trach/vent dependent   HENT:      Head: Normocephalic and atraumatic.      Mouth/Throat:      Mouth: Mucous membranes are dry.   Cardiovascular:      Rate and Rhythm: Normal rate and regular rhythm.   Pulmonary:      Effort: Pulmonary effort is normal. No respiratory distress.      Breath sounds: Rhonchi present.   Abdominal:      General: Bowel sounds are normal.      Palpations: Abdomen is soft.      Tenderness: There is no abdominal tenderness.      Comments: g tube in place   Musculoskeletal:      Cervical back: Normal range of motion and neck supple.      Comments: Contractures to hands   Skin:     General: Skin is warm and dry.   Neurological:      General: No focal deficit present.      GCS: GCS eye subscore is 1. GCS verbal subscore is 1. GCS motor subscore is 1.   Psychiatric:  "     Comments: Unable to assess         Significant Labs:   All pertinent labs within the past 24 hours have been reviewed.  BMP:   Recent Labs   Lab 01/17/22  0335         K 4.6   CL 97   CO2 26   BUN 7   CREATININE 0.4*   CALCIUM 9.5   MG 1.6     CBC:   Recent Labs   Lab 01/15/22  1341 01/17/22  0335   WBC 5.62 4.59   HGB 9.1* 9.2*   HCT 29.4* 31.1*   * 474*       Significant Imaging: I have reviewed all pertinent imaging results/findings within the past 24 hours.      Assessment/Plan:      * Sepsis  This patient does have evidence of infective focus  My overall impression is sepsis. Vital signs were reviewed and noted in progress note.  Antibiotics given-   Antibiotics (From admission, onward)            Start     Stop Route Frequency Ordered    01/16/22 1300  vancomycin 1.25 g in dextrose 5% 250 mL IVPB (ready to mix)         -- IV Every 12 hours (non-standard times) 01/16/22 1221    01/16/22 1253  vancomycin - pharmacy to dose  (vancomycin IVPB)        "And" Linked Group Details    -- IV pharmacy to manage frequency 01/16/22 1153    01/12/22 1930  cefTAZidime-avibactam (AVYCAZ) 2.5 g in dextrose 5 % 100 mL         -- IV Every 8 hours (non-standard times) 01/12/22 1757        Cultures were taken-   Microbiology Results (last 7 days)     Procedure Component Value Units Date/Time    Aerobic culture [[id number]]  (Abnormal)  (Susceptibility) Collected: 01/13/22 1120    Order Status: Completed Specimen: Body Fluid from Gallbladder Updated: 01/17/22 1010     Aerobic Bacterial Culture KLEBSIELLA PNEUMONIAE ESBL  Moderate        ENTEROCOCCUS FAECALIS  Moderate      Blood culture [[id number]] Collected: 01/12/22 1627    Order Status: Completed Specimen: Blood from Peripheral, Right Hand Updated: 01/16/22 2312     Blood Culture, Routine No Growth to date      No Growth to date      No Growth to date      No Growth to date      No Growth to date    Blood culture [[id number]] Collected: 01/12/22 1627    " Order Status: Completed Specimen: Blood from Peripheral, Left Hand Updated: 01/16/22 2312     Blood Culture, Routine No Growth to date      No Growth to date      No Growth to date      No Growth to date      No Growth to date    Culture, Respiratory with Gram Stain [[id number]]  (Abnormal)  (Susceptibility) Collected: 01/05/22 2009    Order Status: Completed Specimen: Respiratory from Endotracheal Aspirate Updated: 01/15/22 1030     Respiratory Culture No S aureus isolated.      SERRATIA MARCESCENS  Many        KLEBSIELLA PNEUMONIAE ESBL  Moderate        PSEUDOMONAS AERUGINOSA   Moderate       Gram Stain (Respiratory) <10 epithelial cells per low power field.     Gram Stain (Respiratory) Rare WBC's     Gram Stain (Respiratory) Few Gram negative rods     Gram Stain (Respiratory) Rare yeast    Culture, Anaerobe [[id number]] Collected: 01/13/22 1120    Order Status: Completed Specimen: Body Fluid from Gallbladder Updated: 01/14/22 1441     Anaerobic Culture Culture in progress        Latest lactate reviewed, they are-  No results for input(s): LACTATE in the last 72 hours.    Organ dysfunction indicated by tachycardia, tachypnea  Source- respiratory, urine      Source control Achieved by- IV abx      Acute cholecystitis  Status post percutaneous cholecystostomy drain placement on January 13, 2022. Follow General surgery recommendations.  Continue intravenous antibiotic therapy as per ID specialist.        Hyponatremia  resolved      UTI (urinary tract infection)  IV meropenem; follow culture  Microbiology Results (last 7 days)     Procedure Component Value Units Date/Time    Aerobic culture [[id number]]  (Abnormal)  (Susceptibility) Collected: 01/13/22 1120    Order Status: Completed Specimen: Body Fluid from Gallbladder Updated: 01/17/22 1010     Aerobic Bacterial Culture KLEBSIELLA PNEUMONIAE ESBL  Moderate        ENTEROCOCCUS FAECALIS  Moderate      Blood culture [[id number]] Collected: 01/12/22 1627    Order  Status: Completed Specimen: Blood from Peripheral, Right Hand Updated: 01/16/22 2312     Blood Culture, Routine No Growth to date      No Growth to date      No Growth to date      No Growth to date      No Growth to date    Blood culture [[id number]] Collected: 01/12/22 1627    Order Status: Completed Specimen: Blood from Peripheral, Left Hand Updated: 01/16/22 2312     Blood Culture, Routine No Growth to date      No Growth to date      No Growth to date      No Growth to date      No Growth to date    Culture, Respiratory with Gram Stain [[id number]]  (Abnormal)  (Susceptibility) Collected: 01/05/22 2009    Order Status: Completed Specimen: Respiratory from Endotracheal Aspirate Updated: 01/15/22 1030     Respiratory Culture No S aureus isolated.      SERRATIA MARCESCENS  Many        KLEBSIELLA PNEUMONIAE ESBL  Moderate        PSEUDOMONAS AERUGINOSA   Moderate       Gram Stain (Respiratory) <10 epithelial cells per low power field.     Gram Stain (Respiratory) Rare WBC's     Gram Stain (Respiratory) Few Gram negative rods     Gram Stain (Respiratory) Rare yeast    Culture, Anaerobe [[id number]] Collected: 01/13/22 1120    Order Status: Completed Specimen: Body Fluid from Gallbladder Updated: 01/14/22 1441     Anaerobic Culture Culture in progress                Pressure injury of left buttock, stage 4  Consult wound care  Turn q2h        Ventilator associated pneumonia  Failed OP abx  Pt vent dependent-consult pulmonology for vent management  On intravenous meropenem and vancomycin.  Blood and sputum cx  Nebulizer treatments        Gastroesophageal reflux disease  Chronic; utilize pepcid acutely.      Essential hypertension  Chronic, stable.  Latest blood pressure and vitals reviewed-   Temp:  [96.08 °F (35.6 °C)-98.24 °F (36.8 °C)]   Pulse:  []   Resp:  [10-75]   BP: (113-160)/(62-98)   SpO2:  [94 %-99 %] .   Home meds for hypertension were reviewed and noted below.   No chronic antihypertensives noted.  Review of home meds reveals midodrine which has been reordered.      Will utilize p.r.n. blood pressure medication only if patient's blood pressure greater than  180/110 and she develops symptoms such as worsening chest pain or shortness of breath.        Malnutrition of moderate degree  Nutrition consult.      Acute on chronic respiratory failure with hypoxia  Patient with Hypoxic Respiratory failure which is Chronic.  she is vent dependent. Supplemental oxygen was provided and noted- Vent Mode: A/CMV-VC  Oxygen Concentration (%):  [35] 35  Resp Rate Total:  [24 br/min-43 br/min] 24 br/min  Vt Set:  [350 mL] 350 mL  PEEP/CPAP:  [4.3 cmH20-8.2 cmH20] 5.3 cmH20.   Signs/symptoms of respiratory failure include- tachypnea and increased work of breathing. Contributing diagnoses includes - Aspiration and Pneumonia Labs and images were reviewed. Patient Has not had a recent ABG. Will treat underlying causes.    PEG (percutaneous endoscopic gastrostomy) status  Noted; care per nursing.  TF per nutrition recs.  Slowly advance PEG tube feeding.        Tracheostomy dependence  Noted; care per nursing      Anoxic brain injury  Noted.  Turn q2h      Anemia of chronic disease  Patient's anemia is currently controlled. S/p 0 units of PRBCs.  Current CBC reviewed-   Lab Results   Component Value Date    HGB 8.2 (L) 01/06/2022    HCT 26.9 (L) 01/06/2022    MCV 92 01/06/2022     01/06/2022     Monitor serial CBC and transfuse if patient becomes hemodynamically unstable, symptomatic or H/H drops below 7/21.           VTE Risk Mitigation (From admission, onward)         Ordered     enoxaparin injection 40 mg  Daily         01/04/22 0034     IP VTE HIGH RISK PATIENT  Once         01/04/22 0034     Place sequential compression device  Until discontinued         01/04/22 0034                Discharge Planning   [place]:      Code Status: Full Code   Is the patient medically ready for discharge?:     Reason for patient still in  hospital (select all that apply): Patient trending condition and Treatment  Discharge Plan A: Long-term acute care facility (LTAC)            Critical care time spent on the evaluation and treatment of severe organ dysfunction, review of pertinent labs and imaging studies, discussions with consulting providers and discussions with patient/family: 60 minutes.      Gabriella Allen MD  Department of Hospital Medicine   [facility]

## 2022-01-17 NOTE — ASSESSMENT & PLAN NOTE
"This patient does have evidence of infective focus  My overall impression is sepsis. Vital signs were reviewed and noted in progress note.  Antibiotics given-   Antibiotics (From admission, onward)            Start     Stop Route Frequency Ordered    01/16/22 1300  vancomycin 1.25 g in dextrose 5% 250 mL IVPB (ready to mix)         -- IV Every 12 hours (non-standard times) 01/16/22 1221    01/16/22 1253  vancomycin - pharmacy to dose  (vancomycin IVPB)        "And" Linked Group Details    -- IV pharmacy to manage frequency 01/16/22 1153    01/12/22 1930  cefTAZidime-avibactam (AVYCAZ) 2.5 g in dextrose 5 % 100 mL         -- IV Every 8 hours (non-standard times) 01/12/22 1757        Cultures were taken-   Microbiology Results (last 7 days)     Procedure Component Value Units Date/Time    Aerobic culture [[id number]]  (Abnormal)  (Susceptibility) Collected: 01/13/22 1120    Order Status: Completed Specimen: Body Fluid from Gallbladder Updated: 01/17/22 1010     Aerobic Bacterial Culture KLEBSIELLA PNEUMONIAE ESBL  Moderate        ENTEROCOCCUS FAECALIS  Moderate      Blood culture [[id number]] Collected: 01/12/22 1627    Order Status: Completed Specimen: Blood from Peripheral, Right Hand Updated: 01/16/22 2312     Blood Culture, Routine No Growth to date      No Growth to date      No Growth to date      No Growth to date      No Growth to date    Blood culture [[id number]] Collected: 01/12/22 1627    Order Status: Completed Specimen: Blood from Peripheral, Left Hand Updated: 01/16/22 2312     Blood Culture, Routine No Growth to date      No Growth to date      No Growth to date      No Growth to date      No Growth to date    Culture, Respiratory with Gram Stain [[id number]]  (Abnormal)  (Susceptibility) Collected: 01/05/22 2009    Order Status: Completed Specimen: Respiratory from Endotracheal Aspirate Updated: 01/15/22 1030     Respiratory Culture No S aureus isolated.      SERRATIA MARCESCENS  Many        KLEBSIELLA " PNEUMONIAE ESBL  Moderate        PSEUDOMONAS AERUGINOSA   Moderate       Gram Stain (Respiratory) <10 epithelial cells per low power field.     Gram Stain (Respiratory) Rare WBC's     Gram Stain (Respiratory) Few Gram negative rods     Gram Stain (Respiratory) Rare yeast    Culture, Anaerobe [[id number]] Collected: 01/13/22 1120    Order Status: Completed Specimen: Body Fluid from Gallbladder Updated: 01/14/22 1441     Anaerobic Culture Culture in progress        Latest lactate reviewed, they are-  No results for input(s): LACTATE in the last 72 hours.    Organ dysfunction indicated by tachycardia, tachypnea  Source- respiratory, urine      Source control Achieved by- IV abx

## 2022-01-18 LAB
ALBUMIN SERPL BCP-MCNC: 2.3 G/DL (ref 3.5–5.2)
ALP SERPL-CCNC: 148 U/L (ref 55–135)
ALT SERPL W/O P-5'-P-CCNC: 13 U/L (ref 10–44)
ANION GAP SERPL CALC-SCNC: 12 MMOL/L (ref 8–16)
AST SERPL-CCNC: 16 U/L (ref 10–40)
BACTERIA SPEC ANAEROBE CULT: NORMAL
BILIRUB SERPL-MCNC: 0.4 MG/DL (ref 0.1–1)
BUN SERPL-MCNC: 10 MG/DL (ref 6–20)
CALCIUM SERPL-MCNC: 9.1 MG/DL (ref 8.7–10.5)
CHLORIDE SERPL-SCNC: 98 MMOL/L (ref 95–110)
CO2 SERPL-SCNC: 27 MMOL/L (ref 23–29)
CREAT SERPL-MCNC: 0.4 MG/DL (ref 0.5–1.4)
EST. GFR  (AFRICAN AMERICAN): >60 ML/MIN/1.73 M^2
EST. GFR  (NON AFRICAN AMERICAN): >60 ML/MIN/1.73 M^2
GLUCOSE SERPL-MCNC: 107 MG/DL (ref 70–110)
MAGNESIUM SERPL-MCNC: 1.7 MG/DL (ref 1.6–2.6)
PHOSPHATE SERPL-MCNC: 3.2 MG/DL (ref 2.7–4.5)
POCT GLUCOSE: 129 MG/DL (ref 70–110)
POCT GLUCOSE: 130 MG/DL (ref 70–110)
POCT GLUCOSE: 131 MG/DL (ref 70–110)
POTASSIUM SERPL-SCNC: 3.8 MMOL/L (ref 3.5–5.1)
PROT SERPL-MCNC: 7 G/DL (ref 6–8.4)
SODIUM SERPL-SCNC: 137 MMOL/L (ref 136–145)
VANCOMYCIN SERPL-MCNC: 13.2 UG/ML

## 2022-01-18 PROCEDURE — 27000221 HC OXYGEN, UP TO 24 HOURS

## 2022-01-18 PROCEDURE — 94761 N-INVAS EAR/PLS OXIMETRY MLT: CPT

## 2022-01-18 PROCEDURE — 25000242 PHARM REV CODE 250 ALT 637 W/ HCPCS: Performed by: NURSE PRACTITIONER

## 2022-01-18 PROCEDURE — 84100 ASSAY OF PHOSPHORUS: CPT | Performed by: NURSE PRACTITIONER

## 2022-01-18 PROCEDURE — 94640 AIRWAY INHALATION TREATMENT: CPT

## 2022-01-18 PROCEDURE — 99233 PR SUBSEQUENT HOSPITAL CARE,LEVL III: ICD-10-PCS | Mod: S$GLB,,, | Performed by: INTERNAL MEDICINE

## 2022-01-18 PROCEDURE — 99900035 HC TECH TIME PER 15 MIN (STAT)

## 2022-01-18 PROCEDURE — 80202 ASSAY OF VANCOMYCIN: CPT | Performed by: INTERNAL MEDICINE

## 2022-01-18 PROCEDURE — 25000003 PHARM REV CODE 250: Performed by: INTERNAL MEDICINE

## 2022-01-18 PROCEDURE — 99900026 HC AIRWAY MAINTENANCE (STAT)

## 2022-01-18 PROCEDURE — 20000000 HC ICU ROOM

## 2022-01-18 PROCEDURE — 63600175 PHARM REV CODE 636 W HCPCS: Performed by: NURSE PRACTITIONER

## 2022-01-18 PROCEDURE — 36415 COLL VENOUS BLD VENIPUNCTURE: CPT | Performed by: INTERNAL MEDICINE

## 2022-01-18 PROCEDURE — 25000003 PHARM REV CODE 250: Performed by: STUDENT IN AN ORGANIZED HEALTH CARE EDUCATION/TRAINING PROGRAM

## 2022-01-18 PROCEDURE — 25000003 PHARM REV CODE 250: Performed by: SURGERY

## 2022-01-18 PROCEDURE — 63600175 PHARM REV CODE 636 W HCPCS: Performed by: INTERNAL MEDICINE

## 2022-01-18 PROCEDURE — 25000003 PHARM REV CODE 250: Performed by: NURSE PRACTITIONER

## 2022-01-18 PROCEDURE — 80053 COMPREHEN METABOLIC PANEL: CPT | Performed by: NURSE PRACTITIONER

## 2022-01-18 PROCEDURE — 27000207 HC ISOLATION

## 2022-01-18 PROCEDURE — 36415 COLL VENOUS BLD VENIPUNCTURE: CPT | Performed by: NURSE PRACTITIONER

## 2022-01-18 PROCEDURE — 83735 ASSAY OF MAGNESIUM: CPT | Performed by: NURSE PRACTITIONER

## 2022-01-18 PROCEDURE — 63600175 PHARM REV CODE 636 W HCPCS: Performed by: STUDENT IN AN ORGANIZED HEALTH CARE EDUCATION/TRAINING PROGRAM

## 2022-01-18 PROCEDURE — 94003 VENT MGMT INPAT SUBQ DAY: CPT

## 2022-01-18 PROCEDURE — 99233 SBSQ HOSP IP/OBS HIGH 50: CPT | Mod: S$GLB,,, | Performed by: INTERNAL MEDICINE

## 2022-01-18 PROCEDURE — 63600175 PHARM REV CODE 636 W HCPCS: Performed by: SURGERY

## 2022-01-18 RX ORDER — LORAZEPAM 2 MG/ML
2 INJECTION INTRAMUSCULAR EVERY 4 HOURS PRN
Status: DISCONTINUED | OUTPATIENT
Start: 2022-01-18 | End: 2022-01-21 | Stop reason: HOSPADM

## 2022-01-18 RX ORDER — LORAZEPAM 2 MG/ML
INJECTION INTRAMUSCULAR
Status: COMPLETED
Start: 2022-01-18 | End: 2022-01-20

## 2022-01-18 RX ADMIN — CEFTAZIDIME, AVIBACTAM 2.5 G: 2; .5 POWDER, FOR SOLUTION INTRAVENOUS at 11:01

## 2022-01-18 RX ADMIN — ENOXAPARIN SODIUM 40 MG: 40 INJECTION SUBCUTANEOUS at 05:01

## 2022-01-18 RX ADMIN — LORAZEPAM 2 MG: 2 INJECTION INTRAMUSCULAR; INTRAVENOUS at 08:01

## 2022-01-18 RX ADMIN — MIDODRINE HYDROCHLORIDE 5 MG: 5 TABLET ORAL at 02:01

## 2022-01-18 RX ADMIN — MICONAZOLE NITRATE: 20 CREAM TOPICAL at 08:01

## 2022-01-18 RX ADMIN — FAMOTIDINE 20 MG: 10 INJECTION INTRAVENOUS at 08:01

## 2022-01-18 RX ADMIN — MORPHINE SULFATE 2 MG: 2 INJECTION, SOLUTION INTRAMUSCULAR; INTRAVENOUS at 02:01

## 2022-01-18 RX ADMIN — MORPHINE SULFATE 2 MG: 2 INJECTION, SOLUTION INTRAMUSCULAR; INTRAVENOUS at 10:01

## 2022-01-18 RX ADMIN — HYPROMELLOSE 2910 1 DROP: 5 SOLUTION OPHTHALMIC at 08:01

## 2022-01-18 RX ADMIN — IPRATROPIUM BROMIDE AND ALBUTEROL SULFATE 3 ML: 2.5; .5 SOLUTION RESPIRATORY (INHALATION) at 12:01

## 2022-01-18 RX ADMIN — CHLORHEXIDINE GLUCONATE 0.12% ORAL RINSE 15 ML: 1.2 LIQUID ORAL at 08:01

## 2022-01-18 RX ADMIN — HYPROMELLOSE 2910 1 DROP: 5 SOLUTION OPHTHALMIC at 02:01

## 2022-01-18 RX ADMIN — IPRATROPIUM BROMIDE AND ALBUTEROL SULFATE 3 ML: 2.5; .5 SOLUTION RESPIRATORY (INHALATION) at 03:01

## 2022-01-18 RX ADMIN — IPRATROPIUM BROMIDE AND ALBUTEROL SULFATE 3 ML: 2.5; .5 SOLUTION RESPIRATORY (INHALATION) at 07:01

## 2022-01-18 RX ADMIN — VANCOMYCIN HYDROCHLORIDE 1250 MG: 1.25 INJECTION, POWDER, LYOPHILIZED, FOR SOLUTION INTRAVENOUS at 02:01

## 2022-01-18 RX ADMIN — LORAZEPAM 2 MG: 2 INJECTION INTRAMUSCULAR; INTRAVENOUS at 12:01

## 2022-01-18 RX ADMIN — MORPHINE SULFATE 2 MG: 2 INJECTION, SOLUTION INTRAMUSCULAR; INTRAVENOUS at 04:01

## 2022-01-18 RX ADMIN — MIDODRINE HYDROCHLORIDE 5 MG: 5 TABLET ORAL at 08:01

## 2022-01-18 RX ADMIN — MICONAZOLE NITRATE: 20 CREAM TOPICAL at 09:01

## 2022-01-18 RX ADMIN — CEFTAZIDIME, AVIBACTAM 2.5 G: 2; .5 POWDER, FOR SOLUTION INTRAVENOUS at 08:01

## 2022-01-18 RX ADMIN — IPRATROPIUM BROMIDE AND ALBUTEROL SULFATE 3 ML: 2.5; .5 SOLUTION RESPIRATORY (INHALATION) at 04:01

## 2022-01-18 RX ADMIN — IPRATROPIUM BROMIDE AND ALBUTEROL SULFATE 3 ML: 2.5; .5 SOLUTION RESPIRATORY (INHALATION) at 11:01

## 2022-01-18 RX ADMIN — CEFTAZIDIME, AVIBACTAM 2.5 G: 2; .5 POWDER, FOR SOLUTION INTRAVENOUS at 03:01

## 2022-01-18 NOTE — CARE UPDATE
01/18/22 0850   Patient Assessment/Suction   Level of Consciousness (AVPU) alert   PRE-TX-O2   O2 Device (Oxygen Therapy) ventilator   Oxygen Concentration (%) 30   SpO2 99 %   Pulse Oximetry Type Continuous   Pulse 84   Resp (!) 52   BP (!) 68/37   ETCO2   ETCO2 (mmHg) 30 mmHg   Vent Select   Conventional Vent Y   Charged w/in last 24h YES   Preset Conventional Ventilator Settings   Vent Type NKV-550   Ventilation Type VC   Vent Mode SIMV-VC-PS   Humidity HME   Set Rate 24 BPM   Vt Set 350 mL   PEEP/CPAP 6.2 cmH20   Pressure Support 10 cmH20   Patient Ventilator Parameters   Resp Rate Total 56 br/min   Peak Airway Pressure 51.4 cmH2O   Exhaled Vt 317 mL   Total Ve 14.2 mL   Spont Ve 9.6 L   I:E Ratio Measured 1:1.4   Inspired Tidal Volume (VTI) 269 mL   Conventional Ventilator Alarms   Apnea Rate 24   Apnea Volume (mL) 350 mL   Ready to Wean/Extubation Screen   FIO2<=50 (chart decimal) 0.3   MV<16L (chart vol.) 14.2   PEEP <=8 (chart #) 6.2   Ready to Wean Parameters   F/VT Ratio<105 (RSBI) 164.04

## 2022-01-18 NOTE — CARE UPDATE
01/18/22 0729   Patient Assessment/Suction   Level of Consciousness (AVPU) alert   All Lung Fields Breath Sounds diminished   Suction Method tracheal   $ Suction Charges Inline Suction Procedure Stat Charge   Secretions Amount small   Secretions Color pale;yellow   Secretions Characteristics thick   PRE-TX-O2   O2 Device (Oxygen Therapy) ventilator   $ Is the patient on Low Flow Oxygen? Yes   Oxygen Concentration (%) 30   SpO2 97 %   Pulse Oximetry Type Continuous   $ Pulse Oximetry - Multiple Charge Pulse Oximetry - Multiple   Pulse 74   Resp (!) 24   ETCO2   $ ETCO2 Usage Currently wearing   ETCO2 (mmHg) 28 mmHg   Aerosol Therapy   $ Aerosol Therapy Charges Aerosol Treatment   Respiratory Treatment Status (SVN) given   Treatment Route (SVN) in-line   Patient Position (SVN) HOB elevated   Signs of Intolerance (SVN) none   Skin Integrity   $ Wound Care Tech Time 15 min   Area Observed Neck under tracheostomy   Skin Appearance without discoloration   Barrier used? Other (see comments)  (drain sponge)        Surgical Airway Portex Cuffed   No placement date or time found.   Present Prior to Hospital Arrival?: Yes  Type: Tracheostomy  Brand: Portex  Airway Device Size: 8.0  Style: Cuffed   Status Secured   Site Assessment Clean;Dry   Ties Assessment Clean;Dry;Intact   Airway Safety   Ambu bag with the patient? Yes, Adult Ambu   Is mask with the patient? Yes, Adult Mask   Vent Select   Conventional Vent Y   Ventilator Initiated No   $ Ventilator Subsequent 1   Charged w/in last 24h YES   Preset Conventional Ventilator Settings   Vent Type NKV-550   Ventilation Type VC   Vent Mode A/CMV-VC   Humidity HME   Set Rate 24 BPM   Vt Set 350 mL   PEEP/CPAP 5.7 cmH20   Patient Ventilator Parameters   Resp Rate Total 25 br/min   Peak Airway Pressure 23.1 cmH2O   Exhaled Vt 125 mL   Total Ve 8.2 mL   I:E Ratio Measured 1:3.4   Inspired Tidal Volume (VTI) 125 mL   Conventional Ventilator Alarms   Apnea Rate 24   Apnea Volume  (mL) 350 mL   Ready to Wean/Extubation Screen   FIO2<=50 (chart decimal) 0.3   MV<16L (chart vol.) 8.2   PEEP <=8 (chart #) 5.7   Ready to Wean Parameters   F/VT Ratio<105 (RSBI) 192

## 2022-01-18 NOTE — PROGRESS NOTES
[facility] Hospital Medicine  Progress Note    Patient Name: Genna Felix  MRN: [id number]  Patient Class: IP- Inpatient   Admission Date: 1/3/2022  Length of Stay: 15 days  Attending Physician: Gabriella Allen MD  Primary Care Provider: Primary Doctor No        Subjective:     Principal Problem:Sepsis        HPI:  Genna Felix is a 61 year old female with a past medical history of anoxic brain injury s/p G tube and tracheostomy, PE, HTN, GERD who presented from [place] for further evaluation of tachycardia.  She was discharged from this facility on 12/27/21 with a diagnosis of pneumonia with rx for bactrim.  In the ED she is found to be tachycardic with WBC 14K, lactic acid 2.6 and evidence of UTI.  CXR reveals worsening multifocal pneumonia.  Pt is nonverbal and cannot contribute to history of present illness, therefore further information is limited.  While in the ED, IV zosyn was initiated and she was give an IVF bolus.  She will be admitted to the service of hospital medicine for continued monitoring and treatment.      Overview/Hospital Course:  61-year-old female with past medical history of anoxic brain injury, PE, hypertension, GERD admitted to the hospital medicine service for UTI and worsening multifocal pneumonia.  She was placed on broad-spectrum antibiotics.  Pulmonologist was consulted.  Urine culture grew presumptive Proteus.  Sputum culture grew Pseudomonas species, Serratia species, Klebsiella pneumoniae (ESBL).  During hospital stay patient noted to have increase gastric residuals.  No definite abdominal obstruction identified.  Patient underwent a CT scan of the abdomen and pelvis which suggested acute cholecystitis for which General surgery team was consulted.  Patient underwent HIDA scan which confirm cystic duct obstruction.  General surgeon recommended percutaneous cholecystostomy drain placement which was done by Interventional Radiology on January 13, 2022.  Services  of infectious diseases specialist are being obtained who upgraded antibiotic therapy to intravenous Avycase.      Interval History:  NO ACUTE EVENTS OVERNIGHT.  KUB WAS NEGATIVE FOR ANY ACUTE CHANGES.  PATIENT HAS A MIDLINE IN PLACE AFEBRILE    Review of Systems   Unable to perform ROS: Patient nonverbal     Objective:     Vital Signs (Most Recent):  Temp: 97.7 °F (36.5 °C) (01/18/22 1200)  Pulse: 70 (01/18/22 1537)  Resp: (!) 24 (01/18/22 1537)  BP: 113/68 (01/18/22 1500)  SpO2: 99 % (01/18/22 1537) Vital Signs (24h Range):  Temp:  [96.08 °F (35.6 °C)-98.78 °F (37.1 °C)] 97.7 °F (36.5 °C)  Pulse:  [] 70  Resp:  [24-59] 24  SpO2:  [93 %-100 %] 99 %  BP: ()/(37-72) 113/68     Weight: 71.7 kg (158 lb 1.1 oz)  Body mass index is 30.87 kg/m².    Intake/Output Summary (Last 24 hours) at 1/18/2022 1613  Last data filed at 1/18/2022 0641  Gross per 24 hour   Intake 2019.62 ml   Output 1450 ml   Net 569.62 ml      Physical Exam  Vitals and nursing note reviewed.   Constitutional:       General: She is not in acute distress.     Appearance: She is well-developed. She is not ill-appearing.      Comments: Chronic trach/vent dependent   HENT:      Head: Normocephalic and atraumatic.      Mouth/Throat:      Mouth: Mucous membranes are dry.   Cardiovascular:      Rate and Rhythm: Normal rate and regular rhythm.   Pulmonary:      Effort: Pulmonary effort is normal. No respiratory distress.      Breath sounds: No rhonchi.   Abdominal:      General: Bowel sounds are normal.      Palpations: Abdomen is soft.      Tenderness: There is no abdominal tenderness.      Comments: g tube in place   Musculoskeletal:      Cervical back: Normal range of motion and neck supple.      Comments: Contractures to hands   Skin:     General: Skin is warm and dry.   Neurological:      General: No focal deficit present.      GCS: GCS eye subscore is 1. GCS verbal subscore is 1. GCS motor subscore is 1.   Psychiatric:      Comments: Unable to  "assess         Significant Labs:   All pertinent labs within the past 24 hours have been reviewed.  CBC:   Recent Labs   Lab 01/17/22  0335   WBC 4.59   HGB 9.2*   HCT 31.1*   *     CMP:   Recent Labs   Lab 01/17/22  0335 01/18/22  0323    137   K 4.6 3.8   CL 97 98   CO2 26 27    107   BUN 7 10   CREATININE 0.4* 0.4*   CALCIUM 9.5 9.1   PROT 7.7 7.0   ALBUMIN 2.4* 2.3*   BILITOT 0.5 0.4   ALKPHOS 160* 148*   AST 16 16   ALT 14 13   ANIONGAP 13 12   EGFRNONAA >60 >60       Significant Imaging: I have reviewed all pertinent imaging results/findings within the past 24 hours.      Assessment/Plan:      * Sepsis  This patient does have evidence of infective focus  My overall impression is sepsis. Vital signs were reviewed and noted in progress note.  Antibiotics given-   Antibiotics (From admission, onward)            Start     Stop Route Frequency Ordered    01/16/22 1300  vancomycin 1.25 g in dextrose 5% 250 mL IVPB (ready to mix)         -- IV Every 12 hours (non-standard times) 01/16/22 1221    01/16/22 1253  vancomycin - pharmacy to dose  (vancomycin IVPB)        "And" Linked Group Details    -- IV pharmacy to manage frequency 01/16/22 1153    01/12/22 1930  cefTAZidime-avibactam (AVYCAZ) 2.5 g in dextrose 5 % 100 mL         -- IV Every 8 hours (non-standard times) 01/12/22 1757        Cultures were taken-   Microbiology Results (last 7 days)     Procedure Component Value Units Date/Time    Aerobic culture [[id number]]  (Abnormal)  (Susceptibility) Collected: 01/13/22 1120    Order Status: Completed Specimen: Body Fluid from Gallbladder Updated: 01/17/22 1010     Aerobic Bacterial Culture KLEBSIELLA PNEUMONIAE ESBL  Moderate        ENTEROCOCCUS FAECALIS  Moderate      Blood culture [[id number]] Collected: 01/12/22 1627    Order Status: Completed Specimen: Blood from Peripheral, Right Hand Updated: 01/16/22 2312     Blood Culture, Routine No Growth to date      No Growth to date      No Growth to " date      No Growth to date      No Growth to date    Blood culture [[id number]] Collected: 01/12/22 1627    Order Status: Completed Specimen: Blood from Peripheral, Left Hand Updated: 01/16/22 2312     Blood Culture, Routine No Growth to date      No Growth to date      No Growth to date      No Growth to date      No Growth to date    Culture, Respiratory with Gram Stain [[id number]]  (Abnormal)  (Susceptibility) Collected: 01/05/22 2009    Order Status: Completed Specimen: Respiratory from Endotracheal Aspirate Updated: 01/15/22 1030     Respiratory Culture No S aureus isolated.      SERRATIA MARCESCENS  Many        KLEBSIELLA PNEUMONIAE ESBL  Moderate        PSEUDOMONAS AERUGINOSA   Moderate       Gram Stain (Respiratory) <10 epithelial cells per low power field.     Gram Stain (Respiratory) Rare WBC's     Gram Stain (Respiratory) Few Gram negative rods     Gram Stain (Respiratory) Rare yeast    Culture, Anaerobe [[id number]] Collected: 01/13/22 1120    Order Status: Completed Specimen: Body Fluid from Gallbladder Updated: 01/14/22 1441     Anaerobic Culture Culture in progress        Latest lactate reviewed, they are-  No results for input(s): LACTATE in the last 72 hours.    Organ dysfunction indicated by tachycardia, tachypnea  Source- respiratory, urine      Source control Achieved by- IV abx      Acute cholecystitis  Status post percutaneous cholecystostomy drain placement on January 13, 2022. Follow General surgery recommendations.  Continue intravenous antibiotic therapy as per ID specialist.        Hyponatremia  resolved      UTI (urinary tract infection)  IV meropenem; follow culture  Microbiology Results (last 7 days)     Procedure Component Value Units Date/Time    Aerobic culture [[id number]]  (Abnormal)  (Susceptibility) Collected: 01/13/22 1120    Order Status: Completed Specimen: Body Fluid from Gallbladder Updated: 01/17/22 1010     Aerobic Bacterial Culture KLEBSIELLA PNEUMONIAE ESBL  Moderate         ENTEROCOCCUS FAECALIS  Moderate      Blood culture [[id number]] Collected: 01/12/22 1627    Order Status: Completed Specimen: Blood from Peripheral, Right Hand Updated: 01/16/22 2312     Blood Culture, Routine No Growth to date      No Growth to date      No Growth to date      No Growth to date      No Growth to date    Blood culture [[id number]] Collected: 01/12/22 1627    Order Status: Completed Specimen: Blood from Peripheral, Left Hand Updated: 01/16/22 2312     Blood Culture, Routine No Growth to date      No Growth to date      No Growth to date      No Growth to date      No Growth to date    Culture, Respiratory with Gram Stain [[id number]]  (Abnormal)  (Susceptibility) Collected: 01/05/22 2009    Order Status: Completed Specimen: Respiratory from Endotracheal Aspirate Updated: 01/15/22 1030     Respiratory Culture No S aureus isolated.      SERRATIA MARCESCENS  Many        KLEBSIELLA PNEUMONIAE ESBL  Moderate        PSEUDOMONAS AERUGINOSA   Moderate       Gram Stain (Respiratory) <10 epithelial cells per low power field.     Gram Stain (Respiratory) Rare WBC's     Gram Stain (Respiratory) Few Gram negative rods     Gram Stain (Respiratory) Rare yeast    Culture, Anaerobe [[id number]] Collected: 01/13/22 1120    Order Status: Completed Specimen: Body Fluid from Gallbladder Updated: 01/14/22 1441     Anaerobic Culture Culture in progress                Pressure injury of left buttock, stage 4  Consult wound care  Turn q2h        Ventilator associated pneumonia  Failed OP abx  Pt vent dependent-consult pulmonology for vent management  On intravenous meropenem and vancomycin.  Blood and sputum cx  Nebulizer treatments        Gastroesophageal reflux disease  Chronic; utilize pepcid acutely.      Essential hypertension  Chronic, stable.  Latest blood pressure and vitals reviewed-   Temp:  [96.08 °F (35.6 °C)-98.24 °F (36.8 °C)]   Pulse:  []   Resp:  [10-75]   BP: (113-160)/(62-98)   SpO2:  [94 %-99 %]  .   Home meds for hypertension were reviewed and noted below.   No chronic antihypertensives noted. Review of home meds reveals midodrine which has been reordered.      Will utilize p.r.n. blood pressure medication only if patient's blood pressure greater than  180/110 and she develops symptoms such as worsening chest pain or shortness of breath.        Malnutrition of moderate degree  Nutrition consult.      Acute on chronic respiratory failure with hypoxia  Patient with Hypoxic Respiratory failure which is Chronic.  she is vent dependent. Supplemental oxygen was provided and noted- Vent Mode: A/CMV-VC  Oxygen Concentration (%):  [35] 35  Resp Rate Total:  [24 br/min-43 br/min] 24 br/min  Vt Set:  [350 mL] 350 mL  PEEP/CPAP:  [4.3 cmH20-8.2 cmH20] 5.3 cmH20.   Signs/symptoms of respiratory failure include- tachypnea and increased work of breathing. Contributing diagnoses includes - Aspiration and Pneumonia Labs and images were reviewed. Patient Has not had a recent ABG. Will treat underlying causes.    PEG (percutaneous endoscopic gastrostomy) status  Noted; care per nursing.  TF per nutrition recs.  Slowly advance PEG tube feeding.        Tracheostomy dependence  Noted; care per nursing      Anoxic brain injury  Noted.  Turn q2h      Anemia of chronic disease  Patient's anemia is currently controlled. S/p 0 units of PRBCs.  Current CBC reviewed-   Lab Results   Component Value Date    HGB 8.2 (L) 01/06/2022    HCT 26.9 (L) 01/06/2022    MCV 92 01/06/2022     01/06/2022     Monitor serial CBC and transfuse if patient becomes hemodynamically unstable, symptomatic or H/H drops below 7/21.           VTE Risk Mitigation (From admission, onward)         Ordered     enoxaparin injection 40 mg  Daily         01/04/22 0034     IP VTE HIGH RISK PATIENT  Once         01/04/22 0034     Place sequential compression device  Until discontinued         01/04/22 0034                Discharge Planning   [place]:      Code  Status: Full Code   Is the patient medically ready for discharge?:     Reason for patient still in hospital (select all that apply): Patient trending condition and Treatment  Discharge Plan A: Long-term acute care facility (LTAC)            Critical care time spent on the evaluation and treatment of severe organ dysfunction, review of pertinent labs and imaging studies, discussions with consulting providers and discussions with patient/family: 60 minutes.      Gabriella Allen MD  Department of Hospital Medicine   [facility]

## 2022-01-18 NOTE — PROGRESS NOTES
Pharmacokinetic Assessment Follow Up: IV Vancomycin    Vancomycin serum concentration assessment(s):    The trough level was drawn correctly and can be used to guide therapy at this time. The measurement is below the desired definitive target range of 15 to 20 mcg/mL.    Vancomycin Regimen Plan:    Change regimen to Vancomycin 1250 mg IV every 18 hours with next serum trough concentration measured at 0730 prior to next dose on 1/18/22    Drug levels (last 3 results):  Recent Labs   Lab Result Units 01/17/22  1203 01/18/22  1243   Vancomycin, Random ug/mL  --  13.2   Vancomycin-Trough ug/mL 20.1  --        Pharmacy will continue to follow and monitor vancomycin.    Please contact pharmacy at extension [phone] for questions regarding this assessment.    Thank you for the consult,   Koffi Spencer       Patient brief summary:  Genna Felix is a 61 y.o. female initiated on antimicrobial therapy with IV Vancomycin for treatment of intra-abdominal infection    The patient's current regimen is vancomycin 1250 mg every 18 hours.    Drug Allergies:   Review of patient's allergies indicates:  No Known Allergies    Actual Body Weight:   71.7 kg (158 lb 1.1 oz)    Renal Function:   Estimated Creatinine Clearance: 133.9 mL/min (A) (based on SCr of 0.4 mg/dL (L)).,     Dialysis Method (if applicable):  N/A    CBC (last 72 hours):  Recent Labs   Lab Result Units 01/17/22  0335   WBC K/uL 4.59   Hemoglobin g/dL 9.2*   Hematocrit % 31.1*   Platelets K/uL 474*   Gran % % 52.5   Lymph % % 27.0   Mono % % 11.3   Eosinophil % % 8.3*   Basophil % % 0.7   Differential Method  Automated       Metabolic Panel (last 72 hours):  Recent Labs   Lab Result Units 01/16/22  0345 01/17/22  0335 01/18/22  0323   Sodium mmol/L 132* 136 137   Potassium mmol/L 4.7 4.6 3.8   Chloride mmol/L 96 97 98   CO2 mmol/L 25 26 27   Glucose mg/dL 107 101 107   BUN mg/dL 7 7 10   Creatinine mg/dL 0.5 0.4* 0.4*   Albumin g/dL 2.5* 2.4* 2.3*   Total Bilirubin mg/dL 0.5  0.5 0.4   Alkaline Phosphatase U/L 166* 160* 148*   AST U/L 19 16 16   ALT U/L 14 14 13   Magnesium mg/dL 1.6 1.6 1.7   Phosphorus mg/dL 3.0 3.4 3.2       Vancomycin Administrations:  vancomycin given in the last 96 hours                     vancomycin in dextrose 5 % 1 gram/250 mL IVPB 1,000 mg ()  Restarted 01/17/22 2114      Restarted  2023     1,000 mg New Bag  2021    vancomycin 1.25 g in dextrose 5% 250 mL IVPB (ready to mix) ()  Restarted 01/17/22 0214     1,250 mg New Bag  0052     1,250 mg New Bag 01/16/22 1353                    Microbiologic Results:  Microbiology Results (last 7 days)       Procedure Component Value Units Date/Time    Blood culture [[id number]] Collected: 01/12/22 1627    Order Status: Completed Specimen: Blood from Peripheral, Left Hand Updated: 01/17/22 2312     Blood Culture, Routine No growth after 5 days.    Blood culture [[id number]] Collected: 01/12/22 1627    Order Status: Completed Specimen: Blood from Peripheral, Right Hand Updated: 01/17/22 2312     Blood Culture, Routine No growth after 5 days.    Aerobic culture [[id number]]  (Abnormal)  (Susceptibility) Collected: 01/13/22 1120    Order Status: Completed Specimen: Body Fluid from Gallbladder Updated: 01/17/22 1010     Aerobic Bacterial Culture KLEBSIELLA PNEUMONIAE ESBL  Moderate        ENTEROCOCCUS FAECALIS  Moderate      Culture, Respiratory with Gram Stain [[id number]]  (Abnormal)  (Susceptibility) Collected: 01/05/22 2009    Order Status: Completed Specimen: Respiratory from Endotracheal Aspirate Updated: 01/15/22 1030     Respiratory Culture No S aureus isolated.      SERRATIA MARCESCENS  Many        KLEBSIELLA PNEUMONIAE ESBL  Moderate        PSEUDOMONAS AERUGINOSA   Moderate       Gram Stain (Respiratory) <10 epithelial cells per low power field.     Gram Stain (Respiratory) Rare WBC's     Gram Stain (Respiratory) Few Gram negative rods     Gram Stain (Respiratory) Rare yeast    Culture, Anaerobe [[id number]]  Collected: 01/13/22 1120    Order Status: Completed Specimen: Body Fluid from Gallbladder Updated: 01/14/22 1441     Anaerobic Culture Culture in progress

## 2022-01-18 NOTE — PLAN OF CARE
LTAC referrals sent via [facility] to:    [facility]  VANESSA Carney       01/18/22 7256   Post-Acute Status   Post-Acute Authorization Placement   Post-Acute Placement Status Referrals Sent

## 2022-01-18 NOTE — SUBJECTIVE & OBJECTIVE
Interval History:  NO ACUTE EVENTS OVERNIGHT.  KUB WAS NEGATIVE FOR ANY ACUTE CHANGES.  PATIENT HAS A MIDLINE IN PLACE AFEBRILE    Review of Systems   Unable to perform ROS: Patient nonverbal     Objective:     Vital Signs (Most Recent):  Temp: 97.7 °F (36.5 °C) (01/18/22 1200)  Pulse: 70 (01/18/22 1537)  Resp: (!) 24 (01/18/22 1537)  BP: 113/68 (01/18/22 1500)  SpO2: 99 % (01/18/22 1537) Vital Signs (24h Range):  Temp:  [96.08 °F (35.6 °C)-98.78 °F (37.1 °C)] 97.7 °F (36.5 °C)  Pulse:  [] 70  Resp:  [24-59] 24  SpO2:  [93 %-100 %] 99 %  BP: ()/(37-72) 113/68     Weight: 71.7 kg (158 lb 1.1 oz)  Body mass index is 30.87 kg/m².    Intake/Output Summary (Last 24 hours) at 1/18/2022 1613  Last data filed at 1/18/2022 0641  Gross per 24 hour   Intake 2019.62 ml   Output 1450 ml   Net 569.62 ml      Physical Exam  Vitals and nursing note reviewed.   Constitutional:       General: She is not in acute distress.     Appearance: She is well-developed. She is not ill-appearing.      Comments: Chronic trach/vent dependent   HENT:      Head: Normocephalic and atraumatic.      Mouth/Throat:      Mouth: Mucous membranes are dry.   Cardiovascular:      Rate and Rhythm: Normal rate and regular rhythm.   Pulmonary:      Effort: Pulmonary effort is normal. No respiratory distress.      Breath sounds: No rhonchi.   Abdominal:      General: Bowel sounds are normal.      Palpations: Abdomen is soft.      Tenderness: There is no abdominal tenderness.      Comments: g tube in place   Musculoskeletal:      Cervical back: Normal range of motion and neck supple.      Comments: Contractures to hands   Skin:     General: Skin is warm and dry.   Neurological:      General: No focal deficit present.      GCS: GCS eye subscore is 1. GCS verbal subscore is 1. GCS motor subscore is 1.   Psychiatric:      Comments: Unable to assess         Significant Labs:   All pertinent labs within the past 24 hours have been reviewed.  CBC:   Recent  Labs   Lab 01/17/22  0335   WBC 4.59   HGB 9.2*   HCT 31.1*   *     CMP:   Recent Labs   Lab 01/17/22  0335 01/18/22  0323    137   K 4.6 3.8   CL 97 98   CO2 26 27    107   BUN 7 10   CREATININE 0.4* 0.4*   CALCIUM 9.5 9.1   PROT 7.7 7.0   ALBUMIN 2.4* 2.3*   BILITOT 0.5 0.4   ALKPHOS 160* 148*   AST 16 16   ALT 14 13   ANIONGAP 13 12   EGFRNONAA >60 >60       Significant Imaging: I have reviewed all pertinent imaging results/findings within the past 24 hours.

## 2022-01-18 NOTE — PLAN OF CARE
Problem: Communication Impairment (Mechanical Ventilation, Invasive)  Goal: Effective Communication  Outcome: Ongoing, Progressing

## 2022-01-18 NOTE — PROGRESS NOTES
Progress Note  Infectious Disease    Reason for Consult:  Acute cholecystitis/ pneumonia    HPI: Genna Felix is a 61 y.o. female resident of [facility], with past medical history of anoxic brain injury status post tracheostomy/peg tube, prior PE, hypertension, GERD sent for tachycardia.  Patient with prior admission to [facility] for pneumonia, discharged 12/27/21 on Bactrim.  Information obtained from medical chart.  Patient is nonverbal at baseline, no family present.  In the ER, tachycardic, trach to vent.   Lab significant for WBC of 14, PMN:  87.5%  UA grossly positive, urine culture grew Proteus mirabilis likely ESBL.  Blood cultures from admission 1/4 bottles grew CoNS, likely a contaminant.  Chest x-ray right lung airspace disease c/w pneumonia.  Respiratory culture from 01/05 with multiple organisms including Serratia marcescens, ESBL Klebsiella pneumoniae, and  Pseudomonas.    Empirically started on Vancomycin and Zosyn, switched to Meropenem 1/9.     Further workup including CT scan abdomen/pelvis revealed findings c/w acute cholecystitis with 2 stones in the cystic duct.  HIDA scan suggestive of cystic duct obstruction.     Plan for IR guided percutaneous drainage on 1/13.     ID consult for acute cholecystitis and antibiotic recommendations.     INTERVAL HISTORY:   1/13:  Interim reviewed, patient status post IR guided per ирина.  Large amount of purulent bilious fluid drained, drain in place, fluid sent for cultures, in process.  Tachypneic, hypertensive, afebrile. Tracheal secretions remain thick and purulent. Repeat blood cultures 1/12 x 2 sets no growth. .    1/14: Patient seen and examined at bedside. No acute events overnight, she is trach to vent FiO2 50%, cloudy white secretions.WBC: 5, no left shift, stable. Lab called, sensitivities for Avycaz will be available later today or tomorrow morning. IR drain in place, draining purulent bilious fluid. Repeat blood cultures no growth.   "   01/15/2022 Dr Mccormack  No new eventomfortable.  Intubated.  Nonverbal., on vent 24/350/30%  procal 0.57---0.15  CRP  123.8---62.9   GB fluid GNR  01/16/2020 to Dr. Mccormack.  No new events.  Still intubated.  Cultures from gallbladder fluid are showing Enterococcus and Klebsiella ESBL.    1/17(Chris) consult reviewed and d/w Cassidy Schneider and Ksenia.  Afebrile, ventilator needs unchanged, no pressors, urine output not recorded for the last 24+hours.  Procalcitonin normal, CRP down to 41, white blood cells been normal for several days, chest x-ray is improving. Drainage from cholecystostomy not particularly purulent.   1/18: interim reviewed. tmax 99. Vent needs unchanged.  Cholecystostomy drain putting out very little, 50 cc over the last 24 hours.  KUB today negative, done for abd distention. She had some hypotension and irritability today.     Antibiotics (From admission, onward)            Start     Stop Route Frequency Ordered    01/18/22 1430  vancomycin 1.25 g in dextrose 5% 250 mL IVPB (ready to mix)         -- IV Every 18 hours 01/18/22 1400    01/16/22 1253  vancomycin - pharmacy to dose  (vancomycin IVPB)        "And" Linked Group Details    -- IV pharmacy to manage frequency 01/16/22 1153    01/12/22 1930  cefTAZidime-avibactam (AVYCAZ) 2.5 g in dextrose 5 % 100 mL         -- IV Every 8 hours (non-standard times) 01/12/22 1757        Antifungals (From admission, onward)            Start     Stop Route Frequency Ordered    01/17/22 2100  miconazole 2 % cream         -- Top 2 times daily 01/17/22 1734             EXAM & DIAGNOSTICS REVIEWED:   Vitals:     Temp:  [96.08 °F (35.6 °C)-98.42 °F (36.9 °C)]   Temp: 97.7 °F (36.5 °C) (01/18/22 1200)  Pulse: 70 (01/18/22 1537)  Resp: (!) 24 (01/18/22 1537)  BP: 113/68 (01/18/22 1500)  SpO2: 99 % (01/18/22 1537)    Intake/Output Summary (Last 24 hours) at 1/18/2022 1640  Last data filed at 1/18/2022 0641  Gross per 24 hour   Intake 2019.62 ml   Output 1450 ml   Net " 569.62 ml     Vent Mode: SIMV-VC-PS  Oxygen Concentration (%):  [30] 30  Resp Rate Total:  [24 br/min-56 br/min] 24 br/min  Vt Set:  [350 mL] 350 mL  PEEP/CPAP:  [2.9 cmH20-6.2 cmH20] 5.3 cmH20  Pressure Support:  [10 cmH20] 10 cmH20    General:  Chronically ill-appearing, trach to vent FiO2 30%, in NAD.      Eyes:  Hyperemic conjunctiva both eyes, , anicteric,    ENT:  Limited, moist mucus membranes, no thrush  Neck:  Supple  Lungs: Mild  bibasilar crackles  Heart:  S1/S2+, regular rhythm, no murmurs  Abd:  RUQ IR drain in place,   bilious fluid    PEG tube in place, no evidence of redness or purulent drainage from ostomy, +BS, soft, non tender, moderately distended  :  Hamilton,    Musc:  Flexure contractures on hands  Skin:  Incredible tinea pedis with sloughing of skin  Wound:   Neuro: Not following commands  Psych:  Easily agitated  Lymphatic:      Extrem: No LE edema b/l  VAD:         Isolation:     1/4/22:       Sacrum  Right hand    Right elbow      General Labs reviewed:  Recent Labs   Lab 01/14/22  0339 01/15/22  1341 01/17/22  0335   WBC 5.03 5.62 4.59   HGB 7.7* 9.1* 9.2*   HCT 24.5* 29.4* 31.1*    481* 474*       Recent Labs   Lab 01/16/22  0345 01/17/22  0335 01/18/22  0323   * 136 137   K 4.7 4.6 3.8   CL 96 97 98   CO2 25 26 27   BUN 7 7 10   CREATININE 0.5 0.4* 0.4*   CALCIUM 9.3 9.5 9.1   PROT 7.9 7.7 7.0   BILITOT 0.5 0.5 0.4   ALKPHOS 166* 160* 148*   ALT 14 14 13   AST 19 16 16     Recent Labs   Lab 01/12/22  1627 01/14/22  0339 01/17/22  0335   .8* 62.9* 41.2*     No results for input(s): SEDRATE in the last 168 hours.    Estimated Creatinine Clearance: 133.9 mL/min (A) (based on SCr of 0.4 mg/dL (L)).     Micro:  Microbiology Results (last 7 days)     Procedure Component Value Units Date/Time    Culture, Anaerobe [[id number]] Collected: 01/13/22 1120    Order Status: Completed Specimen: Body Fluid from Gallbladder Updated: 01/18/22 1441     Anaerobic Culture No anaerobes  isolated    Blood culture [[id number]] Collected: 01/12/22 1627    Order Status: Completed Specimen: Blood from Peripheral, Left Hand Updated: 01/17/22 2312     Blood Culture, Routine No growth after 5 days.    Blood culture [[id number]] Collected: 01/12/22 1627    Order Status: Completed Specimen: Blood from Peripheral, Right Hand Updated: 01/17/22 2312     Blood Culture, Routine No growth after 5 days.    Aerobic culture [[id number]]  (Abnormal)  (Susceptibility) Collected: 01/13/22 1120    Order Status: Completed Specimen: Body Fluid from Gallbladder Updated: 01/17/22 1010     Aerobic Bacterial Culture KLEBSIELLA PNEUMONIAE ESBL  Moderate        ENTEROCOCCUS FAECALIS  Moderate      Culture, Respiratory with Gram Stain [[id number]]  (Abnormal)  (Susceptibility) Collected: 01/05/22 2009    Order Status: Completed Specimen: Respiratory from Endotracheal Aspirate Updated: 01/15/22 1030     Respiratory Culture No S aureus isolated.      SERRATIA MARCESCENS  Many        KLEBSIELLA PNEUMONIAE ESBL  Moderate        PSEUDOMONAS AERUGINOSA   Moderate       Gram Stain (Respiratory) <10 epithelial cells per low power field.     Gram Stain (Respiratory) Rare WBC's     Gram Stain (Respiratory) Few Gram negative rods     Gram Stain (Respiratory) Rare yeast          Imaging Reviewed:  CXRs  CT abdomen/pelvis   HIDA scan      IMPRESSION & PLAN     1.  Sepsis resolved, multifactorial; right pneumonia/UTI/acute cholecystitis s/p IR guided perc ирина 1/13   Bilious fluid cultures showing Klebsiella ESBL and Enterococcus faecalis.   Urine culture grew Proteus mirabilis likely ESBL   Blood cultures from admission 1/4 bottles grew CoNS, likely a contaminant   Respiratory culture 1/5 with multiple organisms; Serratia marcescens, ESBL Klebsiella pneumoniae, and  Pseudomonas, the latter susceptible to Avycaz, started 1/12   Repeat blood cultures x 2 sets no growth pending final    Procalcitonin 0.57     2.  PMHx cardiac arrest, anoxic  brain injury, bed-bound, cirrhosis, GERD, nursing home resident, PE, tracheostomy and PEG since July 2020.      Recommendations:  Continue Avycaz 2.5g IV q8h,   for ESBL Klebsiella and Serratia   And vancomycin for the enterococcus  Duration probably through end of this week   if abdominal exam worsens, would repeat the CT    Topical treatment for tinea pedis  Back to NH with cholecystostomy, repeat cholangiogram as outpatient  6 weeks. If cystic duct patent, remove tube, and if not, leave tube in indefinitely(per surgery)    D/w nursing     Medical Decision Making during this encounter was  [_] Low Complexity  [_] Moderate Complexity  [xx] High Complexity

## 2022-01-18 NOTE — NURSING
Patient breathing 50 breaths per minute, restless, blood pressure dropping to the 60s. Dr. London at the bedside, adjusted vent settings to SIMV, ordered ativan (see mar). Patients BP stabilized to the 150s, ativan given, patients restlessness decreased, VSS, will continue to monitor.

## 2022-01-18 NOTE — PLAN OF CARE
POC discussed w/ pt but unable to determine understanding based on pt's condition. Pt trach'd on vent - sats remain >95%. Pt PEG'd w/ TF at goal of 50cc/hr w/ 110cc FWF q4 - tolerating well. Pt unable to verbally communicate or move independenty, arms contracted. Right biliary drain in place - bilious drainage. Hamilton in place - adequate clear yellow urine. BMx1 overnight. Damp, fungal breakdown between toes - sloughing of skin; fungal cream applied per order. Sacral breakdown noted - barrier cream and foam dressing applied.   Problem: Impaired Wound Healing  Goal: Optimal Wound Healing  Outcome: Ongoing, Progressing     Problem: Fall Injury Risk  Goal: Absence of Fall and Fall-Related Injury  Outcome: Ongoing, Progressing     Problem: Skin Injury Risk Increased  Goal: Skin Health and Integrity  Outcome: Ongoing, Progressing     Problem: Infection  Goal: Absence of Infection Signs and Symptoms  Outcome: Ongoing, Progressing     Problem: Device-Related Complication Risk (Enteral Nutrition)  Goal: Safe, Effective Therapy Delivery  Outcome: Ongoing, Progressing     Problem: Feeding Intolerance (Enteral Nutrition)  Goal: Feeding Tolerance  Outcome: Ongoing, Progressing

## 2022-01-19 LAB
ALBUMIN SERPL BCP-MCNC: 2.4 G/DL (ref 3.5–5.2)
ALP SERPL-CCNC: 145 U/L (ref 55–135)
ALT SERPL W/O P-5'-P-CCNC: 15 U/L (ref 10–44)
ANION GAP SERPL CALC-SCNC: 12 MMOL/L (ref 8–16)
AST SERPL-CCNC: 19 U/L (ref 10–40)
BASOPHILS # BLD AUTO: 0.02 K/UL (ref 0–0.2)
BASOPHILS NFR BLD: 0.6 % (ref 0–1.9)
BILIRUB SERPL-MCNC: 0.5 MG/DL (ref 0.1–1)
BUN SERPL-MCNC: 14 MG/DL (ref 6–20)
CALCIUM SERPL-MCNC: 9.2 MG/DL (ref 8.7–10.5)
CHLORIDE SERPL-SCNC: 98 MMOL/L (ref 95–110)
CO2 SERPL-SCNC: 24 MMOL/L (ref 23–29)
CREAT SERPL-MCNC: 0.4 MG/DL (ref 0.5–1.4)
DIFFERENTIAL METHOD: ABNORMAL
EOSINOPHIL # BLD AUTO: 0.2 K/UL (ref 0–0.5)
EOSINOPHIL NFR BLD: 6.8 % (ref 0–8)
ERYTHROCYTE [DISTWIDTH] IN BLOOD BY AUTOMATED COUNT: 17.8 % (ref 11.5–14.5)
EST. GFR  (AFRICAN AMERICAN): >60 ML/MIN/1.73 M^2
EST. GFR  (NON AFRICAN AMERICAN): >60 ML/MIN/1.73 M^2
GLUCOSE SERPL-MCNC: 94 MG/DL (ref 70–110)
HCT VFR BLD AUTO: 27.8 % (ref 37–48.5)
HGB BLD-MCNC: 8.2 G/DL (ref 12–16)
IMM GRANULOCYTES # BLD AUTO: 0.01 K/UL (ref 0–0.04)
IMM GRANULOCYTES NFR BLD AUTO: 0.3 % (ref 0–0.5)
LYMPHOCYTES # BLD AUTO: 0.9 K/UL (ref 1–4.8)
LYMPHOCYTES NFR BLD: 25.6 % (ref 18–48)
MAGNESIUM SERPL-MCNC: 1.7 MG/DL (ref 1.6–2.6)
MCH RBC QN AUTO: 27.1 PG (ref 27–31)
MCHC RBC AUTO-ENTMCNC: 29.5 G/DL (ref 32–36)
MCV RBC AUTO: 92 FL (ref 82–98)
MONOCYTES # BLD AUTO: 0.3 K/UL (ref 0.3–1)
MONOCYTES NFR BLD: 8.8 % (ref 4–15)
NEUTROPHILS # BLD AUTO: 2 K/UL (ref 1.8–7.7)
NEUTROPHILS NFR BLD: 57.9 % (ref 38–73)
NRBC BLD-RTO: 0 /100 WBC
PHOSPHATE SERPL-MCNC: 3.9 MG/DL (ref 2.7–4.5)
PLATELET # BLD AUTO: 400 K/UL (ref 150–450)
PMV BLD AUTO: 10.4 FL (ref 9.2–12.9)
POCT GLUCOSE: 100 MG/DL (ref 70–110)
POCT GLUCOSE: 118 MG/DL (ref 70–110)
POCT GLUCOSE: 86 MG/DL (ref 70–110)
POTASSIUM SERPL-SCNC: 4.5 MMOL/L (ref 3.5–5.1)
PROT SERPL-MCNC: 7.2 G/DL (ref 6–8.4)
RBC # BLD AUTO: 3.03 M/UL (ref 4–5.4)
SODIUM SERPL-SCNC: 134 MMOL/L (ref 136–145)
VANCOMYCIN TROUGH SERPL-MCNC: 14.3 UG/ML (ref 10–22)
WBC # BLD AUTO: 3.4 K/UL (ref 3.9–12.7)

## 2022-01-19 PROCEDURE — 25000003 PHARM REV CODE 250: Performed by: STUDENT IN AN ORGANIZED HEALTH CARE EDUCATION/TRAINING PROGRAM

## 2022-01-19 PROCEDURE — 63600175 PHARM REV CODE 636 W HCPCS: Performed by: INTERNAL MEDICINE

## 2022-01-19 PROCEDURE — 63600175 PHARM REV CODE 636 W HCPCS

## 2022-01-19 PROCEDURE — 36415 COLL VENOUS BLD VENIPUNCTURE: CPT | Performed by: NURSE PRACTITIONER

## 2022-01-19 PROCEDURE — 27200966 HC CLOSED SUCTION SYSTEM

## 2022-01-19 PROCEDURE — 99900035 HC TECH TIME PER 15 MIN (STAT)

## 2022-01-19 PROCEDURE — 80053 COMPREHEN METABOLIC PANEL: CPT | Performed by: NURSE PRACTITIONER

## 2022-01-19 PROCEDURE — 63600175 PHARM REV CODE 636 W HCPCS: Performed by: SURGERY

## 2022-01-19 PROCEDURE — 63600175 PHARM REV CODE 636 W HCPCS: Performed by: NURSE PRACTITIONER

## 2022-01-19 PROCEDURE — 25000003 PHARM REV CODE 250: Performed by: NURSE PRACTITIONER

## 2022-01-19 PROCEDURE — 20000000 HC ICU ROOM

## 2022-01-19 PROCEDURE — 27000221 HC OXYGEN, UP TO 24 HOURS

## 2022-01-19 PROCEDURE — 25000003 PHARM REV CODE 250: Performed by: INTERNAL MEDICINE

## 2022-01-19 PROCEDURE — 83735 ASSAY OF MAGNESIUM: CPT | Performed by: NURSE PRACTITIONER

## 2022-01-19 PROCEDURE — 99232 PR SUBSEQUENT HOSPITAL CARE,LEVL II: ICD-10-PCS | Mod: ,,, | Performed by: INTERNAL MEDICINE

## 2022-01-19 PROCEDURE — 94003 VENT MGMT INPAT SUBQ DAY: CPT

## 2022-01-19 PROCEDURE — 99232 SBSQ HOSP IP/OBS MODERATE 35: CPT | Mod: ,,, | Performed by: INTERNAL MEDICINE

## 2022-01-19 PROCEDURE — 94640 AIRWAY INHALATION TREATMENT: CPT

## 2022-01-19 PROCEDURE — 25000003 PHARM REV CODE 250: Performed by: SURGERY

## 2022-01-19 PROCEDURE — 84100 ASSAY OF PHOSPHORUS: CPT | Performed by: NURSE PRACTITIONER

## 2022-01-19 PROCEDURE — 27000207 HC ISOLATION

## 2022-01-19 PROCEDURE — 99900026 HC AIRWAY MAINTENANCE (STAT)

## 2022-01-19 PROCEDURE — 85025 COMPLETE CBC W/AUTO DIFF WBC: CPT | Performed by: INTERNAL MEDICINE

## 2022-01-19 PROCEDURE — 99233 PR SUBSEQUENT HOSPITAL CARE,LEVL III: ICD-10-PCS | Mod: S$GLB,,, | Performed by: INTERNAL MEDICINE

## 2022-01-19 PROCEDURE — 63600175 PHARM REV CODE 636 W HCPCS: Performed by: STUDENT IN AN ORGANIZED HEALTH CARE EDUCATION/TRAINING PROGRAM

## 2022-01-19 PROCEDURE — 94761 N-INVAS EAR/PLS OXIMETRY MLT: CPT

## 2022-01-19 PROCEDURE — 80202 ASSAY OF VANCOMYCIN: CPT | Performed by: INTERNAL MEDICINE

## 2022-01-19 PROCEDURE — 36415 COLL VENOUS BLD VENIPUNCTURE: CPT | Performed by: INTERNAL MEDICINE

## 2022-01-19 PROCEDURE — 25000242 PHARM REV CODE 250 ALT 637 W/ HCPCS: Performed by: NURSE PRACTITIONER

## 2022-01-19 PROCEDURE — 99233 SBSQ HOSP IP/OBS HIGH 50: CPT | Mod: S$GLB,,, | Performed by: INTERNAL MEDICINE

## 2022-01-19 RX ADMIN — MORPHINE SULFATE 2 MG: 2 INJECTION, SOLUTION INTRAMUSCULAR; INTRAVENOUS at 11:01

## 2022-01-19 RX ADMIN — HYPROMELLOSE 2910 1 DROP: 5 SOLUTION OPHTHALMIC at 09:01

## 2022-01-19 RX ADMIN — MIDODRINE HYDROCHLORIDE 5 MG: 5 TABLET ORAL at 03:01

## 2022-01-19 RX ADMIN — LORAZEPAM 2 MG: 2 INJECTION INTRAMUSCULAR; INTRAVENOUS at 08:01

## 2022-01-19 RX ADMIN — CEFTAZIDIME, AVIBACTAM 2.5 G: 2; .5 POWDER, FOR SOLUTION INTRAVENOUS at 09:01

## 2022-01-19 RX ADMIN — ENOXAPARIN SODIUM 40 MG: 40 INJECTION SUBCUTANEOUS at 04:01

## 2022-01-19 RX ADMIN — MIDODRINE HYDROCHLORIDE 5 MG: 5 TABLET ORAL at 09:01

## 2022-01-19 RX ADMIN — FAMOTIDINE 20 MG: 10 INJECTION INTRAVENOUS at 08:01

## 2022-01-19 RX ADMIN — IPRATROPIUM BROMIDE AND ALBUTEROL SULFATE 3 ML: 2.5; .5 SOLUTION RESPIRATORY (INHALATION) at 03:01

## 2022-01-19 RX ADMIN — MIDODRINE HYDROCHLORIDE 5 MG: 5 TABLET ORAL at 08:01

## 2022-01-19 RX ADMIN — IPRATROPIUM BROMIDE AND ALBUTEROL SULFATE 3 ML: 2.5; .5 SOLUTION RESPIRATORY (INHALATION) at 12:01

## 2022-01-19 RX ADMIN — CHLORHEXIDINE GLUCONATE 0.12% ORAL RINSE 15 ML: 1.2 LIQUID ORAL at 09:01

## 2022-01-19 RX ADMIN — CHLORHEXIDINE GLUCONATE 0.12% ORAL RINSE 15 ML: 1.2 LIQUID ORAL at 08:01

## 2022-01-19 RX ADMIN — LORAZEPAM 2 MG: 2 INJECTION INTRAMUSCULAR; INTRAVENOUS at 04:01

## 2022-01-19 RX ADMIN — IPRATROPIUM BROMIDE AND ALBUTEROL SULFATE 3 ML: 2.5; .5 SOLUTION RESPIRATORY (INHALATION) at 07:01

## 2022-01-19 RX ADMIN — CEFTAZIDIME, AVIBACTAM 2.5 G: 2; .5 POWDER, FOR SOLUTION INTRAVENOUS at 04:01

## 2022-01-19 RX ADMIN — VANCOMYCIN HYDROCHLORIDE 1250 MG: 1.25 INJECTION, POWDER, LYOPHILIZED, FOR SOLUTION INTRAVENOUS at 08:01

## 2022-01-19 RX ADMIN — HYPROMELLOSE 2910 1 DROP: 5 SOLUTION OPHTHALMIC at 08:01

## 2022-01-19 RX ADMIN — HYPROMELLOSE 2910 1 DROP: 5 SOLUTION OPHTHALMIC at 03:01

## 2022-01-19 RX ADMIN — IPRATROPIUM BROMIDE AND ALBUTEROL SULFATE 3 ML: 2.5; .5 SOLUTION RESPIRATORY (INHALATION) at 04:01

## 2022-01-19 RX ADMIN — MICONAZOLE NITRATE: 20 CREAM TOPICAL at 09:01

## 2022-01-19 RX ADMIN — IPRATROPIUM BROMIDE AND ALBUTEROL SULFATE 3 ML: 2.5; .5 SOLUTION RESPIRATORY (INHALATION) at 11:01

## 2022-01-19 RX ADMIN — FAMOTIDINE 20 MG: 10 INJECTION INTRAVENOUS at 09:01

## 2022-01-19 RX ADMIN — CEFTAZIDIME, AVIBACTAM 2.5 G: 2; .5 POWDER, FOR SOLUTION INTRAVENOUS at 11:01

## 2022-01-19 RX ADMIN — MICONAZOLE NITRATE: 20 CREAM TOPICAL at 08:01

## 2022-01-19 NOTE — ASSESSMENT & PLAN NOTE
"This patient does have evidence of infective focus  My overall impression is sepsis. Vital signs were reviewed and noted in progress note.  Antibiotics given-   Antibiotics (From admission, onward)            Start     Stop Route Frequency Ordered    01/18/22 1430  vancomycin 1.25 g in dextrose 5% 250 mL IVPB (ready to mix)         -- IV Every 18 hours 01/18/22 1400    01/16/22 1253  vancomycin - pharmacy to dose  (vancomycin IVPB)        "And" Linked Group Details    -- IV pharmacy to manage frequency 01/16/22 1153    01/12/22 1930  cefTAZidime-avibactam (AVYCAZ) 2.5 g in dextrose 5 % 100 mL         -- IV Every 8 hours (non-standard times) 01/12/22 1757        Cultures were taken-   Microbiology Results (last 7 days)     Procedure Component Value Units Date/Time    Culture, Anaerobe [[id number]] Collected: 01/13/22 1120    Order Status: Completed Specimen: Body Fluid from Gallbladder Updated: 01/18/22 1441     Anaerobic Culture No anaerobes isolated    Blood culture [[id number]] Collected: 01/12/22 1627    Order Status: Completed Specimen: Blood from Peripheral, Left Hand Updated: 01/17/22 2312     Blood Culture, Routine No growth after 5 days.    Blood culture [[id number]] Collected: 01/12/22 1627    Order Status: Completed Specimen: Blood from Peripheral, Right Hand Updated: 01/17/22 2312     Blood Culture, Routine No growth after 5 days.    Aerobic culture [[id number]]  (Abnormal)  (Susceptibility) Collected: 01/13/22 1120    Order Status: Completed Specimen: Body Fluid from Gallbladder Updated: 01/17/22 1010     Aerobic Bacterial Culture KLEBSIELLA PNEUMONIAE ESBL  Moderate        ENTEROCOCCUS FAECALIS  Moderate      Culture, Respiratory with Gram Stain [[id number]]  (Abnormal)  (Susceptibility) Collected: 01/05/22 2009    Order Status: Completed Specimen: Respiratory from Endotracheal Aspirate Updated: 01/15/22 1030     Respiratory Culture No S aureus isolated.      SERRATIA MARCESCENS  Many        KLEBSIELLA " PNEUMONIAE ESBL  Moderate        PSEUDOMONAS AERUGINOSA   Moderate       Gram Stain (Respiratory) <10 epithelial cells per low power field.     Gram Stain (Respiratory) Rare WBC's     Gram Stain (Respiratory) Few Gram negative rods     Gram Stain (Respiratory) Rare yeast        Latest lactate reviewed, they are-  No results for input(s): LACTATE in the last 72 hours.    Organ dysfunction indicated by tachycardia, tachypnea  Source- respiratory, urine      Source control Achieved by- IV abx

## 2022-01-19 NOTE — PROGRESS NOTES
Progress Note  PULMONARY    Admit Date: 1/3/2022   01/19/2022  From Dr Montesinos's consult:  History of Present Illness:  Pt is a 60 yo female with anoxic brain injury, chronic respiratory failure with vent dependence, PEG, cirrhosis, GERD, HTN, seizure disorder, resident of [facility] who presented to the ED for evaluation of tachycardia. Pt is admitted to the ICU with sepsis and pneumonia. She is requiring precedex drip for agitation/tachypnea. She is receiving fluid resuscitation and vanco/zosyn for pneumonia.    SUBJECTIVE:     1/5- no new issues  1/6- no new issues  1/7- remains on vent, GNR on sputum culture, CXR better. Per RN multiple episodes emesis so tube feeds are held  1/8- no new issues  1/9- no new issues  1/10- no new issues  1/11- yesterday had emesis, aspiration event. abg looks ok. Remains on vent  1/12- no new issues. Per nurse there is plan for cholecystectomy?  1/13- remains on vent, afebrile, vs stable. S/p IR PCT this am  1/14 no c/o on vent  1/15- no new issues, continues on vent, tolerating tube feeds  1/19- continues on vent, no changes. Has episodes of tachypnea without desaturation- sometimes requires morphine or ativan    OBJECTIVE:     Vitals (Most recent):  Vitals:    01/19/22 1539   BP:    Pulse: (!) 57   Resp: (!) 24   Temp:        Vitals (24 hour range):  Temp:  [97 °F (36.1 °C)-97.8 °F (36.6 °C)]   Pulse:  [56-77]   Resp:  [24-55]   BP: (100-122)/(61-79)   SpO2:  [93 %-100 %]       Intake/Output Summary (Last 24 hours) at 1/19/2022 1549  Last data filed at 1/19/2022 0644  Gross per 24 hour   Intake 1688.84 ml   Output 1110 ml   Net 578.84 ml          Physical Exam:  The patient's neuro status (alertness,orientation,cognitive function,motor skills,), pharyngeal exam (oral lesions, hygiene, abn dentition,), Neck (jvd,mass,thyroid,nodes in neck and above/below clavicle),RESPIRATORY(symmetry,effort,fremitus,percussion,auscultation),  Cor(rhythm,heart tones including  gallops,perfusion,edema)ABD(distention,hepatic&splenomegaly,tenderness,masses), Skin(rash,cyanosis),Psyc(affect,judgement,).  Exam negative except for these pertinent findings:     opens eyes  Trach w vent  Bilateral ventilated breath sounds  Abdomen soft, distended, hypoactive bowel sounds, PEG in place  Percutaneous ángel drain in place, thick yellow material in bag  Contracted upper ext  +edema upper and lower extremities         Radiographs reviewed: view by direct vision   KUB 1/18/22-      Percutaneous cholecystotomy tube remains in position.  Gastrostomy tube remains in position.  Otherwise negative abdomen.    HIDA scan 1/11-   1. Findings are suggestive of cystic duct obstruction.  2. Common bile duct is patent.  CXR 1/9- worsened appearance R mid/lower lung infiltrates  CXR 1/7- improved infiltrates  CXR 1/3/22- bilateral lower lobe infiltrates, worsened compared to previous film    Labs     Recent Labs   Lab 01/19/22  0726   WBC 3.40*   HGB 8.2*   HCT 27.8*        Recent Labs   Lab 01/19/22  0319   *   K 4.5   CL 98   CO2 24   BUN 14   CREATININE 0.4*   GLU 94   CALCIUM 9.2   MG 1.7   PHOS 3.9   AST 19   ALT 15   ALKPHOS 145*   BILITOT 0.5   PROT 7.2   ALBUMIN 2.4*     No results for input(s): PH, PCO2, PO2, HCO3 in the last 24 hours.  Microbiology Results (last 7 days)     Procedure Component Value Units Date/Time    Culture, Anaerobe [[id number]] Collected: 01/13/22 1120    Order Status: Completed Specimen: Body Fluid from Gallbladder Updated: 01/18/22 1441     Anaerobic Culture No anaerobes isolated    Blood culture [[id number]] Collected: 01/12/22 1627    Order Status: Completed Specimen: Blood from Peripheral, Left Hand Updated: 01/17/22 2312     Blood Culture, Routine No growth after 5 days.    Blood culture [[id number]] Collected: 01/12/22 1627    Order Status: Completed Specimen: Blood from Peripheral, Right Hand Updated: 01/17/22 2312     Blood Culture, Routine No growth after 5 days.     Aerobic culture [[id number]]  (Abnormal)  (Susceptibility) Collected: 01/13/22 1120    Order Status: Completed Specimen: Body Fluid from Gallbladder Updated: 01/17/22 1010     Aerobic Bacterial Culture KLEBSIELLA PNEUMONIAE ESBL  Moderate        ENTEROCOCCUS FAECALIS  Moderate      Culture, Respiratory with Gram Stain [[id number]]  (Abnormal)  (Susceptibility) Collected: 01/05/22 2009    Order Status: Completed Specimen: Respiratory from Endotracheal Aspirate Updated: 01/15/22 1030     Respiratory Culture No S aureus isolated.      SERRATIA MARCESCENS  Many        KLEBSIELLA PNEUMONIAE ESBL  Moderate        PSEUDOMONAS AERUGINOSA   Moderate       Gram Stain (Respiratory) <10 epithelial cells per low power field.     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  Added automatically from request for surgery [id number]        Resolved Hospital Problems   No resolved problems to display.               Plan:     - continue vent, no weaning  - lovenox subq for DVT prophylaxis  - pepcid for GI prophylaxis  - wound care   - continue midodrine  - monitor Hgb  - tube  feeds  - s/p IR PCT for cholecystitis  - antibiotics per ID    Dora Montesinos MD  Pulmonary & Critical Care Medicine

## 2022-01-19 NOTE — SUBJECTIVE & OBJECTIVE
Interval History:  No acute events overnight T-max 99. Vent needs unchanged.  Cholecystostomy drain  very little, 50 cc over the last 24 hours.  currently  waiting on LTAC placement    Review of Systems   Unable to perform ROS: Patient nonverbal     Objective:     Vital Signs (Most Recent):  Temp: 97.7 °F (36.5 °C) (01/19/22 0800)  Pulse: 62 (01/19/22 0900)  Resp: (!) 24 (01/19/22 0900)  BP: 122/73 (01/19/22 0900)  SpO2: 96 % (01/19/22 0900) Vital Signs (24h Range):  Temp:  [97 °F (36.1 °C)-97.8 °F (36.6 °C)] 97.7 °F (36.5 °C)  Pulse:  [56-79] 62  Resp:  [24-55] 24  SpO2:  [93 %-100 %] 96 %  BP: (101-122)/(60-79) 122/73     Weight: 71.7 kg (158 lb 1.1 oz)  Body mass index is 30.87 kg/m².    Intake/Output Summary (Last 24 hours) at 1/19/2022 1035  Last data filed at 1/19/2022 0644  Gross per 24 hour   Intake 1688.84 ml   Output 1110 ml   Net 578.84 ml      Physical Exam  Vitals and nursing note reviewed.   Constitutional:       General: She is not in acute distress.     Appearance: She is well-developed. She is not ill-appearing.      Comments: Chronic trach/vent dependent   HENT:      Head: Normocephalic and atraumatic.      Mouth/Throat:      Mouth: Mucous membranes are dry.   Cardiovascular:      Rate and Rhythm: Normal rate and regular rhythm.   Pulmonary:      Effort: Pulmonary effort is normal. No respiratory distress.      Breath sounds: No rhonchi.   Abdominal:      General: Bowel sounds are normal.      Palpations: Abdomen is soft.      Tenderness: There is no abdominal tenderness.      Comments: g tube in place   Musculoskeletal:      Cervical back: Normal range of motion and neck supple.      Comments: Contractures to hands   Skin:     General: Skin is warm and dry.   Neurological:      General: No focal deficit present.      GCS: GCS eye subscore is 1. GCS verbal subscore is 1. GCS motor subscore is 1.      Cranial Nerves: No cranial nerve deficit.      Motor: No weakness.   Psychiatric:      Comments:  Unable to assess         Significant Labs:   All pertinent labs within the past 24 hours have been reviewed.  BMP:   Recent Labs   Lab 01/19/22  0319   GLU 94   *   K 4.5   CL 98   CO2 24   BUN 14   CREATININE 0.4*   CALCIUM 9.2   MG 1.7     CBC: No results for input(s): WBC, HGB, HCT, PLT in the last 48 hours.    Significant Imaging: I have reviewed all pertinent imaging results/findings within the past 24 hours.

## 2022-01-19 NOTE — PROGRESS NOTES
Progress Note  Infectious Disease    Reason for Consult:  Acute cholecystitis/ pneumonia    HPI: Genna Felix is a 61 y.o. female resident of [facility], with past medical history of anoxic brain injury status post tracheostomy/peg tube, prior PE, hypertension, GERD sent for tachycardia.  Patient with prior admission to [facility] for pneumonia, discharged 12/27/21 on Bactrim.  Information obtained from medical chart.  Patient is nonverbal at baseline, no family present.  In the ER, tachycardic, trach to vent.   Lab significant for WBC of 14, PMN:  87.5%  UA grossly positive, urine culture grew Proteus mirabilis likely ESBL.  Blood cultures from admission 1/4 bottles grew CoNS, likely a contaminant.  Chest x-ray right lung airspace disease c/w pneumonia.  Respiratory culture from 01/05 with multiple organisms including Serratia marcescens, ESBL Klebsiella pneumoniae, and  Pseudomonas.    Empirically started on Vancomycin and Zosyn, switched to Meropenem 1/9.     Further workup including CT scan abdomen/pelvis revealed findings c/w acute cholecystitis with 2 stones in the cystic duct.  HIDA scan suggestive of cystic duct obstruction.     Plan for IR guided percutaneous drainage on 1/13.     ID consult for acute cholecystitis and antibiotic recommendations.     INTERVAL HISTORY:   1/13:  Interim reviewed, patient status post IR guided per ирина.  Large amount of purulent bilious fluid drained, drain in place, fluid sent for cultures, in process.  Tachypneic, hypertensive, afebrile. Tracheal secretions remain thick and purulent. Repeat blood cultures 1/12 x 2 sets no growth. .    1/14: Patient seen and examined at bedside. No acute events overnight, she is trach to vent FiO2 50%, cloudy white secretions.WBC: 5, no left shift, stable. Lab called, sensitivities for Avycaz will be available later today or tomorrow morning. IR drain in place, draining purulent bilious fluid. Repeat blood cultures no  "growth.     01/15/2022 Dr Mccormack  No new eventomfortable.  Intubated.  Nonverbal., on vent 24/350/30%  procal 0.57---0.15  CRP  123.8---62.9   GB fluid GNR  01/16/2020 to Dr. Mccormack.  No new events.  Still intubated.  Cultures from gallbladder fluid are showing Enterococcus and Klebsiella ESBL.    1/17(Chris) consult reviewed and d/w Cassidy Schneider and Ksenia.  Afebrile, ventilator needs unchanged, no pressors, urine output not recorded for the last 24+hours.  Procalcitonin normal, CRP down to 41, white blood cells been normal for several days, chest x-ray is improving. Drainage from cholecystostomy not particularly purulent.   1/18: interim reviewed. tmax 99. Vent needs unchanged.  Cholecystostomy drain putting out very little, 50 cc over the last 24 hours.  KUB today negative, done for abd distention. She had some hypotension and irritability today.  1/19:  Interim reviewed  . No new events or difficulties today. 30 cc from GB today.     Antibiotics (From admission, onward)            Start     Stop Route Frequency Ordered    01/18/22 1430  vancomycin 1.25 g in dextrose 5% 250 mL IVPB (ready to mix)         -- IV Every 18 hours 01/18/22 1400    01/16/22 1253  vancomycin - pharmacy to dose  (vancomycin IVPB)        "And" Linked Group Details    -- IV pharmacy to manage frequency 01/16/22 1153    01/12/22 1930  cefTAZidime-avibactam (AVYCAZ) 2.5 g in dextrose 5 % 100 mL         -- IV Every 8 hours (non-standard times) 01/12/22 1757        Antifungals (From admission, onward)            Start     Stop Route Frequency Ordered    01/17/22 2100  miconazole 2 % cream         -- Top 2 times daily 01/17/22 1734             EXAM & DIAGNOSTICS REVIEWED:   Vitals:     Temp:  [97 °F (36.1 °C)-97.7 °F (36.5 °C)]   Temp: 97.1 °F (36.2 °C) (01/19/22 1600)  Pulse: 64 (01/19/22 1603)  Resp: (!) 24 (01/19/22 1603)  BP: 111/73 (01/19/22 1603)  SpO2: 97 % (01/19/22 1603)    Intake/Output Summary (Last 24 hours) at 1/19/2022 1653  Last " data filed at 1/19/2022 0644  Gross per 24 hour   Intake 1688.84 ml   Output 1110 ml   Net 578.84 ml     Vent Mode: SIMV-VC-PS  Oxygen Concentration (%):  [] 30  Resp Rate Total:  [24 br/min-48 br/min] 24 br/min  Vt Set:  [350 mL] 350 mL  PEEP/CPAP:  [4.3 cmH20-5.4 cmH20] 5.1 cmH20  Pressure Support:  [10 cmH20] 10 cmH20  Mean Airway Pressure:  [15.3 cmH20] 15.3 cmH20    General:  Chronically ill-appearing, trach to vent FiO2 30%, in NAD.      Eyes:  Hyperemic conjunctiva both eyes, , anicteric,    ENT:  Limited, moist mucus membranes, no thrush  Neck:  Supple  Lungs: Clearer but decreased BS in bases. Secretions are light green.  Heart:  S1/S2+, regular rhythm, no murmurs  Abd:  RUQ IR drain in place,   bilious fluid    PEG tube in place, no evidence of redness or purulent drainage from ostomy, +BS, soft, non tender, moderately distended  :  [place],    Musc:  Flexure contractures on hands  Skin:  Incredible tinea pedis with sloughing of skin  Wound:   Neuro: Not following commands  Psych:  Easily agitated  Lymphatic:      Extrem: No LE edema b/l  VAD:         Isolation:     1/4/22:       Sacrum  Right hand    Right elbow      General Labs reviewed:  Recent Labs   Lab 01/15/22  1341 01/17/22  0335 01/19/22  0726   WBC 5.62 4.59 3.40*   HGB 9.1* 9.2* 8.2*   HCT 29.4* 31.1* 27.8*   * 474* 400       Recent Labs   Lab 01/17/22  0335 01/18/22  0323 01/19/22  0319    137 134*   K 4.6 3.8 4.5   CL 97 98 98   CO2 26 27 24   BUN 7 10 14   CREATININE 0.4* 0.4* 0.4*   CALCIUM 9.5 9.1 9.2   PROT 7.7 7.0 7.2   BILITOT 0.5 0.4 0.5   ALKPHOS 160* 148* 145*   ALT 14 13 15   AST 16 16 19     Recent Labs   Lab 01/14/22  0339 01/17/22  0335   CRP 62.9* 41.2*     No results for input(s): SEDRATE in the last 168 hours.    Estimated Creatinine Clearance: 133.9 mL/min (A) (based on SCr of 0.4 mg/dL (L)).     Micro:  Microbiology Results (last 7 days)     Procedure Component Value Units Date/Time    Culture, Anaerobe  [[id number]] Collected: 01/13/22 1120    Order Status: Completed Specimen: Body Fluid from Gallbladder Updated: 01/18/22 1441     Anaerobic Culture No anaerobes isolated    Blood culture [[id number]] Collected: 01/12/22 1627    Order Status: Completed Specimen: Blood from Peripheral, Left Hand Updated: 01/17/22 2312     Blood Culture, Routine No growth after 5 days.    Blood culture [[id number]] Collected: 01/12/22 1627    Order Status: Completed Specimen: Blood from Peripheral, Right Hand Updated: 01/17/22 2312     Blood Culture, Routine No growth after 5 days.    Aerobic culture [[id number]]  (Abnormal)  (Susceptibility) Collected: 01/13/22 1120    Order Status: Completed Specimen: Body Fluid from Gallbladder Updated: 01/17/22 1010     Aerobic Bacterial Culture KLEBSIELLA PNEUMONIAE ESBL  Moderate        ENTEROCOCCUS FAECALIS  Moderate      Culture, Respiratory with Gram Stain [[id number]]  (Abnormal)  (Susceptibility) Collected: 01/05/22 2009    Order Status: Completed Specimen: Respiratory from Endotracheal Aspirate Updated: 01/15/22 1030     Respiratory Culture No S aureus isolated.      SERRATIA MARCESCENS  Many        KLEBSIELLA PNEUMONIAE ESBL  Moderate        PSEUDOMONAS AERUGINOSA   Moderate       Gram Stain (Respiratory) <10 epithelial cells per low power field.     Gram Stain (Respiratory) Rare WBC's     Gram Stain (Respiratory) Few Gram negative rods     Gram Stain (Respiratory) Rare yeast          Imaging Reviewed:  CXRs  CT abdomen/pelvis   HIDA scan  KUB    IMPRESSION & PLAN     1.  Sepsis resolved, multifactorial; right pneumonia/UTI/acute cholecystitis s/p IR guided perc ирина 1/13   Bilious fluid cultures showing Klebsiella ESBL and Enterococcus faecalis.   Urine culture grew Proteus mirabilis likely ESBL   Blood cultures from admission 1/4 bottles grew CoNS, likely a contaminant   Respiratory culture 1/5 with multiple organisms; Serratia marcescens, ESBL Klebsiella pneumoniae, and  Pseudomonas,  the latter susceptible to Avycaz, started 1/12   Repeat blood cultures x 2 sets no growth pending final    Procalcitonin 0.57     2.   Low white blood cell count today, which may be a medication side effect from the antibiotics.    3. PMHx cardiac arrest, anoxic brain injury, bed-bound, cirrhosis, GERD, nursing home resident, PE, tracheostomy and PEG since July 2020.      Recommendations:  Continue Avycaz 2.5g IV q8h,   for ESBL Klebsiella and Serratia   And vancomycin for the enterococcus  Duration probably through end of this week  If the white blood cells worsen any further would check a COVID test.    If chest x-ray improves substantially can deescalate a avycaz to meropenem for a slightly longer treatment course of the cholecystitis.  Today is day 7 of avycaz  Topical treatment for tinea pedis  Back to NH with cholecystostomy, repeat cholangiogram as outpatient  6 weeks. If cystic duct patent, remove tube, and if not, leave tube in indefinitely(per surgery)    D/w nursing     Medical Decision Making during this encounter was  [_] Low Complexity  [_] Moderate Complexity  [xx] High Complexity

## 2022-01-19 NOTE — PROGRESS NOTES
[facility] Hospital Medicine  Progress Note    Patient Name: Genna Felix  MRN: [id number]  Patient Class: IP- Inpatient   Admission Date: 1/3/2022  Length of Stay: 16 days  Attending Physician: Gabriella Allen MD  Primary Care Provider: Primary Doctor No        Subjective:     Principal Problem:Sepsis        HPI:  Genna Felix is a 61 year old female with a past medical history of anoxic brain injury s/p G tube and tracheostomy, PE, HTN, GERD who presented from [place] for further evaluation of tachycardia.  She was discharged from this facility on 12/27/21 with a diagnosis of pneumonia with rx for bactrim.  In the ED she is found to be tachycardic with WBC 14K, lactic acid 2.6 and evidence of UTI.  CXR reveals worsening multifocal pneumonia.  Pt is nonverbal and cannot contribute to history of present illness, therefore further information is limited.  While in the ED, IV zosyn was initiated and she was give an IVF bolus.  She will be admitted to the service of hospital medicine for continued monitoring and treatment.      Overview/Hospital Course:  61-year-old female with past medical history of anoxic brain injury, PE, hypertension, GERD admitted to the hospital medicine service for UTI and worsening multifocal pneumonia.  She was placed on broad-spectrum antibiotics.  Pulmonologist was consulted.  Urine culture grew presumptive Proteus.  Sputum culture grew Pseudomonas species, Serratia species, Klebsiella pneumoniae (ESBL).  During hospital stay patient noted to have increase gastric residuals.  No definite abdominal obstruction identified.  Patient underwent a CT scan of the abdomen and pelvis which suggested acute cholecystitis for which General surgery team was consulted.  Patient underwent HIDA scan which confirm cystic duct obstruction.  General surgeon recommended percutaneous cholecystostomy drain placement which was done by Interventional Radiology on January 13, 2022.  Services  of infectious diseases specialist are being obtained who upgraded antibiotic therapy to intravenous Avycase.      Interval History:  No acute events overnight T-max 99. Vent needs unchanged.  Cholecystostomy drain  very little, 50 cc over the last 24 hours.      Review of Systems   Unable to perform ROS: Patient nonverbal     Objective:     Vital Signs (Most Recent):  Temp: 97.7 °F (36.5 °C) (01/19/22 0800)  Pulse: 62 (01/19/22 0900)  Resp: (!) 24 (01/19/22 0900)  BP: 122/73 (01/19/22 0900)  SpO2: 96 % (01/19/22 0900) Vital Signs (24h Range):  Temp:  [97 °F (36.1 °C)-97.8 °F (36.6 °C)] 97.7 °F (36.5 °C)  Pulse:  [56-79] 62  Resp:  [24-55] 24  SpO2:  [93 %-100 %] 96 %  BP: (101-122)/(60-79) 122/73     Weight: 71.7 kg (158 lb 1.1 oz)  Body mass index is 30.87 kg/m².    Intake/Output Summary (Last 24 hours) at 1/19/2022 1035  Last data filed at 1/19/2022 0644  Gross per 24 hour   Intake 1688.84 ml   Output 1110 ml   Net 578.84 ml      Physical Exam  Vitals and nursing note reviewed.   Constitutional:       General: She is not in acute distress.     Appearance: She is well-developed. She is not ill-appearing.      Comments: Chronic trach/vent dependent   HENT:      Head: Normocephalic and atraumatic.      Mouth/Throat:      Mouth: Mucous membranes are dry.   Cardiovascular:      Rate and Rhythm: Normal rate and regular rhythm.   Pulmonary:      Effort: Pulmonary effort is normal. No respiratory distress.      Breath sounds: No rhonchi.   Abdominal:      General: Bowel sounds are normal.      Palpations: Abdomen is soft.      Tenderness: There is no abdominal tenderness.      Comments: g tube in place   Musculoskeletal:      Cervical back: Normal range of motion and neck supple.      Comments: Contractures to hands   Skin:     General: Skin is warm and dry.   Neurological:      General: No focal deficit present.      GCS: GCS eye subscore is 1. GCS verbal subscore is 1. GCS motor subscore is 1.      Cranial Nerves: No  "cranial nerve deficit.      Motor: No weakness.   Psychiatric:      Comments: Unable to assess         Significant Labs:   All pertinent labs within the past 24 hours have been reviewed.  BMP:   Recent Labs   Lab 01/19/22  0319   GLU 94   *   K 4.5   CL 98   CO2 24   BUN 14   CREATININE 0.4*   CALCIUM 9.2   MG 1.7     CBC: No results for input(s): WBC, HGB, HCT, PLT in the last 48 hours.    Significant Imaging: I have reviewed all pertinent imaging results/findings within the past 24 hours.      Assessment/Plan:      * Sepsis  This patient does have evidence of infective focus  My overall impression is sepsis. Vital signs were reviewed and noted in progress note.  Antibiotics given-   Antibiotics (From admission, onward)                Start     Stop Route Frequency Ordered    01/18/22 1430  vancomycin 1.25 g in dextrose 5% 250 mL IVPB (ready to mix)         -- IV Every 18 hours 01/18/22 1400    01/16/22 1253  vancomycin - pharmacy to dose  (vancomycin IVPB)        "And" Linked Group Details    -- IV pharmacy to manage frequency 01/16/22 1153    01/12/22 1930  cefTAZidime-avibactam (AVYCAZ) 2.5 g in dextrose 5 % 100 mL         -- IV Every 8 hours (non-standard times) 01/12/22 1757          Cultures were taken-   Microbiology Results (last 7 days)       Procedure Component Value Units Date/Time    Culture, Anaerobe [[id number]] Collected: 01/13/22 1120    Order Status: Completed Specimen: Body Fluid from Gallbladder Updated: 01/18/22 1441     Anaerobic Culture No anaerobes isolated    Blood culture [[id number]] Collected: 01/12/22 1627    Order Status: Completed Specimen: Blood from Peripheral, Left Hand Updated: 01/17/22 2312     Blood Culture, Routine No growth after 5 days.    Blood culture [[id number]] Collected: 01/12/22 1627    Order Status: Completed Specimen: Blood from Peripheral, Right Hand Updated: 01/17/22 2312     Blood Culture, Routine No growth after 5 days.    Aerobic culture [[id number]]  " (Abnormal)  (Susceptibility) Collected: 01/13/22 1120    Order Status: Completed Specimen: Body Fluid from Gallbladder Updated: 01/17/22 1010     Aerobic Bacterial Culture KLEBSIELLA PNEUMONIAE ESBL  Moderate        ENTEROCOCCUS FAECALIS  Moderate      Culture, Respiratory with Gram Stain [[id number]]  (Abnormal)  (Susceptibility) Collected: 01/05/22 2009    Order Status: Completed Specimen: Respiratory from Endotracheal Aspirate Updated: 01/15/22 1030     Respiratory Culture No S aureus isolated.      SERRATIA MARCESCENS  Many        KLEBSIELLA PNEUMONIAE ESBL  Moderate        PSEUDOMONAS AERUGINOSA   Moderate       Gram Stain (Respiratory) <10 epithelial cells per low power field.     Gram Stain (Respiratory) Rare WBC's     Gram Stain (Respiratory) Few Gram negative rods     Gram Stain (Respiratory) Rare yeast          Latest lactate reviewed, they are-  No results for input(s): LACTATE in the last 72 hours.    Organ dysfunction indicated by tachycardia, tachypnea  Source- respiratory, urine      Source control Achieved by- IV abx      Acute cholecystitis  Status post percutaneous cholecystostomy drain placement on January 13, 2022. Follow General surgery recommendations.  Continue intravenous antibiotic therapy as per ID specialist.        Hyponatremia  resolved      UTI (urinary tract infection)  IV meropenem; follow culture  Microbiology Results (last 7 days)       Procedure Component Value Units Date/Time    Culture, Anaerobe [[id number]] Collected: 01/13/22 1120    Order Status: Completed Specimen: Body Fluid from Gallbladder Updated: 01/18/22 1441     Anaerobic Culture No anaerobes isolated    Blood culture [[id number]] Collected: 01/12/22 1627    Order Status: Completed Specimen: Blood from Peripheral, Left Hand Updated: 01/17/22 2312     Blood Culture, Routine No growth after 5 days.    Blood culture [[id number]] Collected: 01/12/22 1627    Order Status: Completed Specimen: Blood from Peripheral, Right Hand  Updated: 01/17/22 2312     Blood Culture, Routine No growth after 5 days.    Aerobic culture [[id number]]  (Abnormal)  (Susceptibility) Collected: 01/13/22 1120    Order Status: Completed Specimen: Body Fluid from Gallbladder Updated: 01/17/22 1010     Aerobic Bacterial Culture KLEBSIELLA PNEUMONIAE ESBL  Moderate        ENTEROCOCCUS FAECALIS  Moderate      Culture, Respiratory with Gram Stain [[id number]]  (Abnormal)  (Susceptibility) Collected: 01/05/22 2009    Order Status: Completed Specimen: Respiratory from Endotracheal Aspirate Updated: 01/15/22 1030     Respiratory Culture No S aureus isolated.      SERRATIA MARCESCENS  Many        KLEBSIELLA PNEUMONIAE ESBL  Moderate        PSEUDOMONAS AERUGINOSA   Moderate       Gram Stain (Respiratory) <10 epithelial cells per low power field.     Gram Stain (Respiratory) Rare WBC's     Gram Stain (Respiratory) Few Gram negative rods     Gram Stain (Respiratory) Rare yeast                  Pressure injury of left buttock, stage 4  Consult wound care  Turn q2h        Ventilator associated pneumonia  Failed OP abx  Pt vent dependent-consult pulmonology for vent management  On intravenous meropenem and vancomycin.  Blood and sputum cx  Nebulizer treatments        Gastroesophageal reflux disease  Chronic; utilize pepcid acutely.      Essential hypertension  Chronic, stable.  Latest blood pressure and vitals reviewed-   Temp:  [96.08 °F (35.6 °C)-98.24 °F (36.8 °C)]   Pulse:  []   Resp:  [10-75]   BP: (113-160)/(62-98)   SpO2:  [94 %-99 %] .   Home meds for hypertension were reviewed and noted below.   No chronic antihypertensives noted. Review of home meds reveals midodrine which has been reordered.      Will utilize p.r.n. blood pressure medication only if patient's blood pressure greater than  180/110 and she develops symptoms such as worsening chest pain or shortness of breath.        Malnutrition of moderate degree  Nutrition consult.      Acute on chronic  respiratory failure with hypoxia  Patient with Hypoxic Respiratory failure which is Chronic.  she is vent dependent. Supplemental oxygen was provided and noted- Vent Mode: SIMV-VC-PS  Oxygen Concentration (%):  [] 30  Resp Rate Total:  [24 br/min-48 br/min] 24 br/min  Vt Set:  [350 mL] 350 mL  PEEP/CPAP:  [5 cmH20-5.4 cmH20] 5.4 cmH20  Pressure Support:  [10 cmH20] 10 cmH20  Mean Airway Pressure:  [15.3 cmH20] 15.3 cmH20.   Signs/symptoms of respiratory failure include- tachypnea and increased work of breathing. Contributing diagnoses includes - Aspiration and Pneumonia Labs and images were reviewed. Patient Has not had a recent ABG. Will treat underlying causes.    PEG (percutaneous endoscopic gastrostomy) status  Noted; care per nursing.  TF per nutrition recs.  Slowly advance PEG tube feeding.        Tracheostomy dependence  Noted; care per nursing      Anoxic brain injury  Noted.  Turn q2h      Anemia of chronic disease  Patient's anemia is currently controlled. S/p 0 units of PRBCs.  Current CBC reviewed-   Lab Results   Component Value Date    HGB 8.2 (L) 01/06/2022    HCT 26.9 (L) 01/06/2022    MCV 92 01/06/2022     01/06/2022     Monitor serial CBC and transfuse if patient becomes hemodynamically unstable, symptomatic or H/H drops below 7/21.           VTE Risk Mitigation (From admission, onward)           Ordered     enoxaparin injection 40 mg  Daily         01/04/22 0034     IP VTE HIGH RISK PATIENT  Once         01/04/22 0034     Place sequential compression device  Until discontinued         01/04/22 0034                    Discharge Planning   [place]:       Code Status: Full Code   Is the patient medically ready for discharge?:     Reason for patient still in hospital (select all that apply): Patient trending condition and Treatment  Discharge Plan A: Long-term acute care facility (LTAC)            Critical care time spent on the evaluation and treatment of severe organ dysfunction, review of  pertinent labs and imaging studies, discussions with consulting providers and discussions with patient/family: 60 minutes.      Gabriella Allen MD  Department of Hospital Medicine   [facility]

## 2022-01-19 NOTE — ASSESSMENT & PLAN NOTE
IV meropenem; follow culture  Microbiology Results (last 7 days)     Procedure Component Value Units Date/Time    Culture, Anaerobe [[id number]] Collected: 01/13/22 1120    Order Status: Completed Specimen: Body Fluid from Gallbladder Updated: 01/18/22 1441     Anaerobic Culture No anaerobes isolated    Blood culture [[id number]] Collected: 01/12/22 1627    Order Status: Completed Specimen: Blood from Peripheral, Left Hand Updated: 01/17/22 2312     Blood Culture, Routine No growth after 5 days.    Blood culture [[id number]] Collected: 01/12/22 1627    Order Status: Completed Specimen: Blood from Peripheral, Right Hand Updated: 01/17/22 2312     Blood Culture, Routine No growth after 5 days.    Aerobic culture [[id number]]  (Abnormal)  (Susceptibility) Collected: 01/13/22 1120    Order Status: Completed Specimen: Body Fluid from Gallbladder Updated: 01/17/22 1010     Aerobic Bacterial Culture KLEBSIELLA PNEUMONIAE ESBL  Moderate        ENTEROCOCCUS FAECALIS  Moderate      Culture, Respiratory with Gram Stain [[id number]]  (Abnormal)  (Susceptibility) Collected: 01/05/22 2009    Order Status: Completed Specimen: Respiratory from Endotracheal Aspirate Updated: 01/15/22 1030     Respiratory Culture No S aureus isolated.      SERRATIA MARCESCENS  Many        KLEBSIELLA PNEUMONIAE ESBL  Moderate        PSEUDOMONAS AERUGINOSA   Moderate       Gram Stain (Respiratory) <10 epithelial cells per low power field.     Gram Stain (Respiratory) Rare WBC's     Gram Stain (Respiratory) Few Gram negative rods     Gram Stain (Respiratory) Rare yeast

## 2022-01-19 NOTE — ASSESSMENT & PLAN NOTE
Patient with Hypoxic Respiratory failure which is Chronic.  she is vent dependent. Supplemental oxygen was provided and noted- Vent Mode: SIMV-VC-PS  Oxygen Concentration (%):  [] 30  Resp Rate Total:  [24 br/min-48 br/min] 24 br/min  Vt Set:  [350 mL] 350 mL  PEEP/CPAP:  [5 cmH20-5.4 cmH20] 5.4 cmH20  Pressure Support:  [10 cmH20] 10 cmH20  Mean Airway Pressure:  [15.3 cmH20] 15.3 cmH20.   Signs/symptoms of respiratory failure include- tachypnea and increased work of breathing. Contributing diagnoses includes - Aspiration and Pneumonia Labs and images were reviewed. Patient Has not had a recent ABG. Will treat underlying causes.

## 2022-01-19 NOTE — CARE UPDATE
01/19/22 0715   Patient Assessment/Suction   Level of Consciousness (AVPU) responds to voice   Respiratory Effort Normal;Unlabored   Expansion/Accessory Muscles/Retractions no use of accessory muscles;no retractions;expansion symmetric   All Lung Fields Breath Sounds diminished   Rhythm/Pattern, Respiratory assisted mechanically   Cough Frequency with stimulation   Cough Type assisted   Suction Method tracheal   $ Suction Charges Inline Suction Procedure Stat Charge   Secretions Amount moderate   Secretions Color yellow   Secretions Characteristics thick   PRE-TX-O2   O2 Device (Oxygen Therapy) ventilator   $ Is the patient on Low Flow Oxygen? Yes   Oxygen Concentration (%) 30   SpO2 96 %   Pulse Oximetry Type Continuous   $ Pulse Oximetry - Multiple Charge Pulse Oximetry - Multiple   Pulse 60   Resp (!) 24   Positioning HOB elevated 45 degrees   ETCO2   $ ETCO2 Usage Currently wearing   ETCO2 (mmHg) 23 mmHg   ETCO2 Device Type Bedside Monitor;Ventilator;Artificial Airway   Aerosol Therapy   $ Aerosol Therapy Charges Aerosol Treatment   Respiratory Treatment Status (SVN) given   Treatment Route (SVN) in-line   Patient Position (SVN) HOB elevated   Post Treatment Assessment (SVN) breath sounds unchanged   Signs of Intolerance (SVN) none   Breath Sounds Post-Respiratory Treatment   Throughout All Fields Post-Treatment All Fields   Throughout All Fields Post-Treatment no change   Post-treatment Heart Rate (beats/min) 61   Post-treatment Resp Rate (breaths/min) 24   Skin Integrity   $ Wound Care Tech Time 15 min   Area Observed Neck;Neck under tracheostomy   Skin Appearance without discoloration        Surgical Airway Portex Cuffed   No placement date or time found.   Present Prior to Hospital Arrival?: Yes  Type: Tracheostomy  Brand: Portex  Airway Device Size: 8.0  Style: Cuffed   Cuff Pressure 32 cm H2O   Cuff Inflation? Inflated   Speaking Valve Usage Not wearing   Status Secured   Site Assessment Clean;Dry;No  bleeding;No drainage   Ties Assessment Intact;Secure   Airway Safety   Ambu bag with the patient? Yes, Adult Ambu   Is mask with the patient? Yes, Adult Mask   Vent Select   Conventional Vent Y   Ventilator Initiated No   $ Ventilator Subsequent 1   Charged w/in last 24h YES   Preset Conventional Ventilator Settings   Vent Type NKV-550   Vent Mode SIMV-VC-PS   Humidity HME   Set Rate 24 BPM   Vt Set 350 mL   PEEP/CPAP 5.4 cmH20   Pressure Support 10 cmH20   Waveform RAMP   Insp Time 0.8 Sec(s)   I-Trigger Type  Flow   Trigger Sensitivity Flow/I-Trigger 2.5 L/min   Patient Ventilator Parameters   Resp Rate Total 24 br/min   Peak Airway Pressure 41.7 cmH2O   Mean Airway Pressure 15.3 cmH20   Exhaled Vt 350 mL   Total Ve 7.81 mL   Spont Ve 0 L   I:E Ratio Measured 1:2.1   Total PEEP 5.5 cmH20   Tubing ID (mm) 8 mm   Tube Type Trach   Inspired Tidal Volume (VTI) 355 mL   Conventional Ventilator Alarms   Alarms On Y   Ve High Alarm 20 L/min   Ve Low Alarm 3 L/min   Vt High Alarm 15 mL   Vt Low Alarm 4 mL   Resp Rate High Alarm 40 br/min   Press High Alarm 50 cmH2O   Apnea Rate 24   Apnea Volume (mL) 350 mL   T Apnea 20 sec(s)   Ready to Wean/Extubation Screen   FIO2<=50 (chart decimal) 0.3   MV<16L (chart vol.) 7.81   PEEP <=8 (chart #) 5.4   Ready to Wean Parameters   F/VT Ratio<105 (RSBI) (!) 68.57

## 2022-01-19 NOTE — PROGRESS NOTES
Pharmacokinetic Assessment Follow Up: IV Vancomycin    Vancomycin serum concentration assessment(s):    The trough level was drawn correctly and can be used to guide therapy at this time. The measurement is below the desired definitive target range of 15 to 20 mcg/mL.    Vancomycin Regimen Plan:    Continue regimen of Vancomycin 1250 mg IV every 18 hours with next serum trough concentration measured at 1930 prior to third dose on 1/20/22      Drug levels (last 3 results):  Recent Labs   Lab Result Units 01/17/22  1203 01/18/22  1243 01/19/22  0726   Vancomycin, Random ug/mL  --  13.2  --    Vancomycin-Trough ug/mL 20.1  --  14.3       Pharmacy will continue to follow and monitor vancomycin.    Please contact pharmacy at extension [phone] for questions regarding this assessment.    Thank you for the consult,   Mayra Spencer       Patient brief summary:  Genna Felix is a 61 y.o. female initiated on antimicrobial therapy with IV Vancomycin for treatment of intra-abdominal infection      Drug Allergies:   Review of patient's allergies indicates:  No Known Allergies    Actual Body Weight:   71.7 kg    Renal Function:   Estimated Creatinine Clearance: 133.9 mL/min (A) (based on SCr of 0.4 mg/dL (L)).,     Dialysis Method (if applicable):  N/A    CBC (last 72 hours):  Recent Labs   Lab Result Units 01/17/22  0335   WBC K/uL 4.59   Hemoglobin g/dL 9.2*   Hematocrit % 31.1*   Platelets K/uL 474*   Gran % % 52.5   Lymph % % 27.0   Mono % % 11.3   Eosinophil % % 8.3*   Basophil % % 0.7   Differential Method  Automated       Metabolic Panel (last 72 hours):  Recent Labs   Lab Result Units 01/17/22  0335 01/18/22  0323 01/19/22  0319   Sodium mmol/L 136 137 134*   Potassium mmol/L 4.6 3.8 4.5   Chloride mmol/L 97 98 98   CO2 mmol/L 26 27 24   Glucose mg/dL 101 107 94   BUN mg/dL 7 10 14   Creatinine mg/dL 0.4* 0.4* 0.4*   Albumin g/dL 2.4* 2.3* 2.4*   Total Bilirubin mg/dL 0.5 0.4 0.5   Alkaline Phosphatase U/L 160* 148* 145*   AST  U/L 16 16 19   ALT U/L 14 13 15   Magnesium mg/dL 1.6 1.7 1.7   Phosphorus mg/dL 3.4 3.2 3.9       Vancomycin Administrations:  vancomycin given in the last 96 hours                     vancomycin 1.25 g in dextrose 5% 250 mL IVPB (ready to mix) (mg) 1,250 mg New Bag 01/19/22 0831     1,250 mg New Bag 01/18/22 1418    vancomycin in dextrose 5 % 1 gram/250 mL IVPB 1,000 mg ()  Restarted 01/17/22 2114      Restarted  2023     1,000 mg New Bag  2021    vancomycin 1.25 g in dextrose 5% 250 mL IVPB (ready to mix) ()  Restarted 01/17/22 0214     1,250 mg New Bag  0052     1,250 mg New Bag 01/16/22 1353                    Microbiologic Results:  Microbiology Results (last 7 days)       Procedure Component Value Units Date/Time    Culture, Anaerobe [[id number]] Collected: 01/13/22 1120    Order Status: Completed Specimen: Body Fluid from Gallbladder Updated: 01/18/22 1441     Anaerobic Culture No anaerobes isolated    Blood culture [[id number]] Collected: 01/12/22 1627    Order Status: Completed Specimen: Blood from Peripheral, Left Hand Updated: 01/17/22 2312     Blood Culture, Routine No growth after 5 days.    Blood culture [[id number]] Collected: 01/12/22 1627    Order Status: Completed Specimen: Blood from Peripheral, Right Hand Updated: 01/17/22 2312     Blood Culture, Routine No growth after 5 days.    Aerobic culture [[id number]]  (Abnormal)  (Susceptibility) Collected: 01/13/22 1120    Order Status: Completed Specimen: Body Fluid from Gallbladder Updated: 01/17/22 1010     Aerobic Bacterial Culture KLEBSIELLA PNEUMONIAE ESBL  Moderate        ENTEROCOCCUS FAECALIS  Moderate      Culture, Respiratory with Gram Stain [[id number]]  (Abnormal)  (Susceptibility) Collected: 01/05/22 2009    Order Status: Completed Specimen: Respiratory from Endotracheal Aspirate Updated: 01/15/22 1030     Respiratory Culture No S aureus isolated.      SERRATIA MARCESCENS  Many        KLEBSIELLA PNEUMONIAE ESBL  Moderate        PSEUDOMONAS  AERUGINOSA   Moderate       Gram Stain (Respiratory) <10 epithelial cells per low power field.     Gram Stain (Respiratory) Rare WBC's     Gram Stain (Respiratory) Few Gram negative rods     Gram Stain (Respiratory) Rare yeast

## 2022-01-19 NOTE — PLAN OF CARE
Unable to communicate POC w/ pt due to medical condition.     Problem: Device-Related Complication Risk (Mechanical Ventilation, Invasive)  Goal: Optimal Device Function  Outcome: Ongoing, Progressing     Problem: Inability to Wean (Mechanical Ventilation, Invasive)  Goal: Mechanical Ventilation Liberation  Outcome: Ongoing, Progressing     Problem: Nutrition Impairment (Mechanical Ventilation, Invasive)  Goal: Optimal Nutrition Delivery  Outcome: Ongoing, Progressing     Problem: Communication Impairment (Artificial Airway)  Goal: Effective Communication  Outcome: Ongoing, Progressing     Problem: Skin and Tissue Injury (Artificial Airway)  Goal: Absence of Device-Related Skin or Tissue Injury  Outcome: Ongoing, Progressing     Problem: Adult Inpatient Plan of Care  Goal: Plan of Care Review  Outcome: Ongoing, Progressing

## 2022-01-20 LAB
ALBUMIN SERPL BCP-MCNC: 2.3 G/DL (ref 3.5–5.2)
ALP SERPL-CCNC: 134 U/L (ref 55–135)
ALT SERPL W/O P-5'-P-CCNC: 13 U/L (ref 10–44)
ANION GAP SERPL CALC-SCNC: 10 MMOL/L (ref 8–16)
AST SERPL-CCNC: 16 U/L (ref 10–40)
BILIRUB SERPL-MCNC: 0.4 MG/DL (ref 0.1–1)
BUN SERPL-MCNC: 13 MG/DL (ref 6–20)
CALCIUM SERPL-MCNC: 8.9 MG/DL (ref 8.7–10.5)
CHLORIDE SERPL-SCNC: 99 MMOL/L (ref 95–110)
CO2 SERPL-SCNC: 27 MMOL/L (ref 23–29)
CREAT SERPL-MCNC: 0.4 MG/DL (ref 0.5–1.4)
EST. GFR  (AFRICAN AMERICAN): >60 ML/MIN/1.73 M^2
EST. GFR  (NON AFRICAN AMERICAN): >60 ML/MIN/1.73 M^2
GLUCOSE SERPL-MCNC: 105 MG/DL (ref 70–110)
MAGNESIUM SERPL-MCNC: 1.6 MG/DL (ref 1.6–2.6)
PHOSPHATE SERPL-MCNC: 4 MG/DL (ref 2.7–4.5)
POCT GLUCOSE: 101 MG/DL (ref 70–110)
POCT GLUCOSE: 105 MG/DL (ref 70–110)
POCT GLUCOSE: 106 MG/DL (ref 70–110)
POCT GLUCOSE: 131 MG/DL (ref 70–110)
POTASSIUM SERPL-SCNC: 3.7 MMOL/L (ref 3.5–5.1)
PROT SERPL-MCNC: 6.9 G/DL (ref 6–8.4)
SODIUM SERPL-SCNC: 136 MMOL/L (ref 136–145)
VANCOMYCIN TROUGH SERPL-MCNC: 23.5 UG/ML (ref 10–22)

## 2022-01-20 PROCEDURE — 80202 ASSAY OF VANCOMYCIN: CPT | Performed by: INTERNAL MEDICINE

## 2022-01-20 PROCEDURE — 95816 EEG AWAKE AND DROWSY: CPT | Mod: 26,,, | Performed by: PSYCHIATRY & NEUROLOGY

## 2022-01-20 PROCEDURE — 63600175 PHARM REV CODE 636 W HCPCS: Performed by: INTERNAL MEDICINE

## 2022-01-20 PROCEDURE — 25000003 PHARM REV CODE 250: Performed by: STUDENT IN AN ORGANIZED HEALTH CARE EDUCATION/TRAINING PROGRAM

## 2022-01-20 PROCEDURE — 63600175 PHARM REV CODE 636 W HCPCS: Performed by: SURGERY

## 2022-01-20 PROCEDURE — 95816 PR EEG,W/AWAKE & DROWSY RECORD: ICD-10-PCS | Mod: 26,,, | Performed by: PSYCHIATRY & NEUROLOGY

## 2022-01-20 PROCEDURE — 99232 SBSQ HOSP IP/OBS MODERATE 35: CPT | Mod: ,,, | Performed by: INTERNAL MEDICINE

## 2022-01-20 PROCEDURE — 80053 COMPREHEN METABOLIC PANEL: CPT | Performed by: NURSE PRACTITIONER

## 2022-01-20 PROCEDURE — 83735 ASSAY OF MAGNESIUM: CPT | Performed by: NURSE PRACTITIONER

## 2022-01-20 PROCEDURE — 94640 AIRWAY INHALATION TREATMENT: CPT

## 2022-01-20 PROCEDURE — 25000242 PHARM REV CODE 250 ALT 637 W/ HCPCS: Performed by: NURSE PRACTITIONER

## 2022-01-20 PROCEDURE — 63600175 PHARM REV CODE 636 W HCPCS

## 2022-01-20 PROCEDURE — 94003 VENT MGMT INPAT SUBQ DAY: CPT

## 2022-01-20 PROCEDURE — 25000003 PHARM REV CODE 250: Performed by: INTERNAL MEDICINE

## 2022-01-20 PROCEDURE — 25000003 PHARM REV CODE 250: Performed by: NURSE PRACTITIONER

## 2022-01-20 PROCEDURE — 63600175 PHARM REV CODE 636 W HCPCS: Performed by: NURSE PRACTITIONER

## 2022-01-20 PROCEDURE — 27000207 HC ISOLATION

## 2022-01-20 PROCEDURE — 25000003 PHARM REV CODE 250: Performed by: SURGERY

## 2022-01-20 PROCEDURE — 99233 SBSQ HOSP IP/OBS HIGH 50: CPT | Mod: S$GLB,,, | Performed by: INTERNAL MEDICINE

## 2022-01-20 PROCEDURE — 84100 ASSAY OF PHOSPHORUS: CPT | Performed by: NURSE PRACTITIONER

## 2022-01-20 PROCEDURE — 36415 COLL VENOUS BLD VENIPUNCTURE: CPT | Performed by: NURSE PRACTITIONER

## 2022-01-20 PROCEDURE — 99232 PR SUBSEQUENT HOSPITAL CARE,LEVL II: ICD-10-PCS | Mod: ,,, | Performed by: INTERNAL MEDICINE

## 2022-01-20 PROCEDURE — 99900035 HC TECH TIME PER 15 MIN (STAT)

## 2022-01-20 PROCEDURE — 63600175 PHARM REV CODE 636 W HCPCS: Performed by: STUDENT IN AN ORGANIZED HEALTH CARE EDUCATION/TRAINING PROGRAM

## 2022-01-20 PROCEDURE — 27000221 HC OXYGEN, UP TO 24 HOURS

## 2022-01-20 PROCEDURE — 36415 COLL VENOUS BLD VENIPUNCTURE: CPT | Performed by: INTERNAL MEDICINE

## 2022-01-20 PROCEDURE — 20000000 HC ICU ROOM

## 2022-01-20 PROCEDURE — 99900026 HC AIRWAY MAINTENANCE (STAT)

## 2022-01-20 PROCEDURE — 94761 N-INVAS EAR/PLS OXIMETRY MLT: CPT

## 2022-01-20 PROCEDURE — 99233 PR SUBSEQUENT HOSPITAL CARE,LEVL III: ICD-10-PCS | Mod: S$GLB,,, | Performed by: INTERNAL MEDICINE

## 2022-01-20 RX ORDER — MEROPENEM AND SODIUM CHLORIDE 500 MG/50ML
500 INJECTION, SOLUTION INTRAVENOUS
Status: DISCONTINUED | OUTPATIENT
Start: 2022-01-20 | End: 2022-01-21 | Stop reason: HOSPADM

## 2022-01-20 RX ORDER — CLONAZEPAM 0.5 MG/1
0.5 TABLET ORAL 3 TIMES DAILY
Status: DISCONTINUED | OUTPATIENT
Start: 2022-01-20 | End: 2022-01-21 | Stop reason: HOSPADM

## 2022-01-20 RX ORDER — LEVETIRACETAM 500 MG/5ML
1000 INJECTION, SOLUTION, CONCENTRATE INTRAVENOUS ONCE
Status: COMPLETED | OUTPATIENT
Start: 2022-01-20 | End: 2022-01-20

## 2022-01-20 RX ADMIN — MIDODRINE HYDROCHLORIDE 5 MG: 5 TABLET ORAL at 08:01

## 2022-01-20 RX ADMIN — HYPROMELLOSE 2910 1 DROP: 5 SOLUTION OPHTHALMIC at 08:01

## 2022-01-20 RX ADMIN — CEFTAZIDIME, AVIBACTAM 2.5 G: 2; .5 POWDER, FOR SOLUTION INTRAVENOUS at 05:01

## 2022-01-20 RX ADMIN — FAMOTIDINE 20 MG: 10 INJECTION INTRAVENOUS at 08:01

## 2022-01-20 RX ADMIN — MICONAZOLE NITRATE: 20 CREAM TOPICAL at 08:01

## 2022-01-20 RX ADMIN — LORAZEPAM 2 MG: 2 INJECTION INTRAMUSCULAR; INTRAVENOUS at 02:01

## 2022-01-20 RX ADMIN — IPRATROPIUM BROMIDE AND ALBUTEROL SULFATE 3 ML: 2.5; .5 SOLUTION RESPIRATORY (INHALATION) at 07:01

## 2022-01-20 RX ADMIN — MORPHINE SULFATE 2 MG: 2 INJECTION, SOLUTION INTRAMUSCULAR; INTRAVENOUS at 10:01

## 2022-01-20 RX ADMIN — POTASSIUM BICARBONATE 50 MEQ: 977.5 TABLET, EFFERVESCENT ORAL at 07:01

## 2022-01-20 RX ADMIN — HYPROMELLOSE 2910 1 DROP: 5 SOLUTION OPHTHALMIC at 04:01

## 2022-01-20 RX ADMIN — LORAZEPAM 2 MG: 2 INJECTION INTRAMUSCULAR; INTRAVENOUS at 09:01

## 2022-01-20 RX ADMIN — LORAZEPAM 2 MG: 2 INJECTION INTRAMUSCULAR; INTRAVENOUS at 08:01

## 2022-01-20 RX ADMIN — CHLORHEXIDINE GLUCONATE 0.12% ORAL RINSE 15 ML: 1.2 LIQUID ORAL at 08:01

## 2022-01-20 RX ADMIN — IPRATROPIUM BROMIDE AND ALBUTEROL SULFATE 3 ML: 2.5; .5 SOLUTION RESPIRATORY (INHALATION) at 12:01

## 2022-01-20 RX ADMIN — IPRATROPIUM BROMIDE AND ALBUTEROL SULFATE 3 ML: 2.5; .5 SOLUTION RESPIRATORY (INHALATION) at 11:01

## 2022-01-20 RX ADMIN — IPRATROPIUM BROMIDE AND ALBUTEROL SULFATE 3 ML: 2.5; .5 SOLUTION RESPIRATORY (INHALATION) at 04:01

## 2022-01-20 RX ADMIN — LEVETIRACETAM 1000 MG: 500 INJECTION, SOLUTION INTRAVENOUS at 02:01

## 2022-01-20 RX ADMIN — MEROPENEM AND SODIUM CHLORIDE 500 MG: 500 INJECTION, SOLUTION INTRAVENOUS at 06:01

## 2022-01-20 RX ADMIN — ONDANSETRON 4 MG: 2 INJECTION INTRAMUSCULAR; INTRAVENOUS at 09:01

## 2022-01-20 RX ADMIN — LORAZEPAM 2 MG: 2 INJECTION INTRAMUSCULAR; INTRAVENOUS at 04:01

## 2022-01-20 RX ADMIN — ENOXAPARIN SODIUM 40 MG: 40 INJECTION SUBCUTANEOUS at 04:01

## 2022-01-20 RX ADMIN — MIDODRINE HYDROCHLORIDE 5 MG: 5 TABLET ORAL at 04:01

## 2022-01-20 RX ADMIN — LORAZEPAM 2 MG: 2 INJECTION INTRAMUSCULAR; INTRAVENOUS at 10:01

## 2022-01-20 RX ADMIN — CEFTAZIDIME, AVIBACTAM 2.5 G: 2; .5 POWDER, FOR SOLUTION INTRAVENOUS at 12:01

## 2022-01-20 RX ADMIN — VANCOMYCIN HYDROCHLORIDE 1250 MG: 1.25 INJECTION, POWDER, LYOPHILIZED, FOR SOLUTION INTRAVENOUS at 02:01

## 2022-01-20 RX ADMIN — CLONAZEPAM 0.5 MG: 0.5 TABLET ORAL at 08:01

## 2022-01-20 RX ADMIN — IPRATROPIUM BROMIDE AND ALBUTEROL SULFATE 3 ML: 2.5; .5 SOLUTION RESPIRATORY (INHALATION) at 03:01

## 2022-01-20 NOTE — NURSING
Call placed to EICU after patient had her second episode of the night in which she was in a posturing position, eyes rolled back in her head, eyes twitching, and tachypnea ranging from 50-75 BPM. Patient was given ativan as ordered but took several minutes for patient to return to normal state. New orders per Dr. Dumont for Keppra x 1 dose, CT scan and EEG.

## 2022-01-20 NOTE — RESPIRATORY THERAPY
Patient remains on NK-550 .  H6 86 , ETC02 29mmhg and 02 saturation 100%.  Patient trached via 8 portex with [id number] cuff psi.  Patient receiving aerosol tx via duoneb now and q4hr.  Ambu bag and mask at bedside with alarms on and functioning properly at this time.  Patient give medication for increase in RR.

## 2022-01-20 NOTE — PROCEDURES
EEG    Date/Time: 1/3/2022 7:05 PM  Performed by: Jeronimo Felder MD  Authorized by: Dimas Dumont MD       ELECTROENCEPHALOGRAM REPORT    DATE OF SERVICE: 1/20/22  EEG NUMBER: ON   REQUESTED BY: Julia  LOCATION OF SERVICE: [facility]   Electroencephalographic (EEG) recording is with electrodes placed according to the International 10-20 placement system.  Thirty two (32) channels of digital signal (sampling rate of 512/sec) including T1 and T2 was simultaneously recorded from the scalp and may include  EKG, EMG, and/or eye monitors.  Recording band pass was 0.1 to 512 hz.  Digital video recording of the patient is simultaneously recorded with the EEG.  The patient is instructed report clinical symptoms which may occur during the recording session.  EEG and video recording is stored and archived in digital format. Activation procedures which include photic stimulation, hyperventilation and instructing patients to perform simple task are done in selected patients.    The EEG is displayed on a monitor screen and can be reviewed using different montages.  Computer assisted analysis is employed to detect spike and electrographic seizure activity.   The entire record is submitted for computer analysis.  The entire recording is visually reviewed and the times identified by computer analysis as being spikes or seizures are reviewed again.  Compresses spectral analysis (CSA) is also performed on the activity recorded from each individual channel.  This is displayed as a power display of frequencies from 0 to 30 Hz over time.   The CSA is reviewed looking for asymmetries in power between homologous areas of the scalp and then compared with the original EEG recording.     "[employer]" software was also utilized in the review of this study.  This software suite analyzes the EEG recording in multiple domains.  Coherence and rhythmicity is computed to identify EEG sections which may contain organized seizures.  Each  channel undergoes analysis to detect presence of spike and sharp waves which have special and morphological characteristic of epileptic activity.  The routine EEG recording is converted from spacial into frequency domain.  This is then displayed comparing homologous areas to identify areas of significant asymmetry.  Algorithm to identify non-cortically generated artifact is used to separate eye movement, EMG and other artifact from the EEG    EEG FINDINGS  The record shows a poor organization at rest, with no discernible posterior dominant rhythm with poor reactivity. Background is largely comprised of near- total attenuation with intermittent low voltage delta slowing.      State changes were not seen.     Provocative maneuvers including photic stimulation were performed.      EKG recording shows a sinus rhythm.     There is no push button or clinical event.    IMPRESSION:  Markedly abnormal study with severe, attenuated diffuse background slowing consistent with diffuse cerebral dysfunction and encephalopathy which may be on the basis of toxic, metabolic, or primary neuronal disorder. In the absence of sedation, pattern is consistent with a severe anoxic brain injury.    Jeronimo Felder MD

## 2022-01-20 NOTE — PLAN OF CARE
Problem: Communication Impairment (Mechanical Ventilation, Invasive)  Goal: Effective Communication  Outcome: Ongoing, Progressing     Problem: Device-Related Complication Risk (Mechanical Ventilation, Invasive)  Goal: Optimal Device Function  Outcome: Ongoing, Progressing     Problem: Inability to Wean (Mechanical Ventilation, Invasive)  Goal: Mechanical Ventilation Liberation  Outcome: Ongoing, Progressing     Problem: Nutrition Impairment (Mechanical Ventilation, Invasive)  Goal: Optimal Nutrition Delivery  Outcome: Ongoing, Progressing     Problem: Skin and Tissue Injury (Mechanical Ventilation, Invasive)  Goal: Absence of Device-Related Skin and Tissue Injury  Outcome: Ongoing, Progressing     Problem: Ventilator-Induced Lung Injury (Mechanical Ventilation, Invasive)  Goal: Absence of Ventilator-Induced Lung Injury  Outcome: Ongoing, Progressing     Problem: Communication Impairment (Artificial Airway)  Goal: Effective Communication  Outcome: Ongoing, Progressing     Problem: Device-Related Complication Risk (Artificial Airway)  Goal: Optimal Device Function  Outcome: Ongoing, Progressing     Problem: Skin and Tissue Injury (Artificial Airway)  Goal: Absence of Device-Related Skin or Tissue Injury  Outcome: Ongoing, Progressing     Problem: Noninvasive Ventilation Acute  Goal: Effective Unassisted Ventilation and Oxygenation  Outcome: Ongoing, Progressing     Problem: Adult Inpatient Plan of Care  Goal: Plan of Care Review  Outcome: Ongoing, Progressing  Goal: Patient-Specific Goal (Individualized)  Outcome: Ongoing, Progressing  Goal: Absence of Hospital-Acquired Illness or Injury  Outcome: Ongoing, Progressing  Goal: Optimal Comfort and Wellbeing  Outcome: Ongoing, Progressing  Goal: Readiness for Transition of Care  Outcome: Ongoing, Progressing     Problem: Adjustment to Illness (Delirium)  Goal: Optimal Coping  Outcome: Ongoing, Progressing     Problem: Altered Behavior (Delirium)  Goal: Improved  Behavioral Control  Outcome: Ongoing, Progressing     Problem: Attention and Thought Clarity Impairment (Delirium)  Goal: Improved Attention and Thought Clarity  Outcome: Ongoing, Progressing     Problem: Sleep Disturbance (Delirium)  Goal: Improved Sleep  Outcome: Ongoing, Progressing     Problem: Adjustment to Illness (Sepsis/Septic Shock)  Goal: Optimal Coping  Outcome: Ongoing, Progressing     Problem: Bleeding (Sepsis/Septic Shock)  Goal: Absence of Bleeding  Outcome: Ongoing, Progressing     Problem: Glycemic Control Impaired (Sepsis/Septic Shock)  Goal: Blood Glucose Level Within Desired Range  Outcome: Ongoing, Progressing     Problem: Infection Progression (Sepsis/Septic Shock)  Goal: Absence of Infection Signs and Symptoms  Outcome: Ongoing, Progressing     Problem: Nutrition Impaired (Sepsis/Septic Shock)  Goal: Optimal Nutrition Intake  Outcome: Ongoing, Progressing     Problem: Impaired Wound Healing  Goal: Optimal Wound Healing  Outcome: Ongoing, Progressing     Problem: Fall Injury Risk  Goal: Absence of Fall and Fall-Related Injury  Outcome: Ongoing, Progressing     Problem: Skin Injury Risk Increased  Goal: Skin Health and Integrity  Outcome: Ongoing, Progressing     Problem: Infection  Goal: Absence of Infection Signs and Symptoms  Outcome: Ongoing, Progressing     Problem: Aspiration (Enteral Nutrition)  Goal: Absence of Aspiration Signs and Symptoms  Outcome: Ongoing, Progressing     Problem: Device-Related Complication Risk (Enteral Nutrition)  Goal: Safe, Effective Therapy Delivery  Outcome: Ongoing, Progressing     Problem: Feeding Intolerance (Enteral Nutrition)  Goal: Feeding Tolerance  Outcome: Ongoing, Progressing    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 Peg tube to abdomen with tube feedings Isosource at goal of 50mL/hr. Upon initial assessment, I was unable to get a temperature reading. Rectal thermometer placed with temperature reading of 87.6  F. Bear hugger initiated. NP Dora Ritchie made aware. No other orders at this time.  Patient also had 2 episodes during the night in which patient appeared to be posturing with eyes rolled back in her head, eyes appeared to have a tremor, and patient was very tachypneic, RR ranging from 50-75. EICU called and new orders received for 1 time dose of Keppra, CT scan stat and EEG. Hamilton catheter in place. 450 mL of output. Vent settings maintained. BM x2 during shift. Safety maintained, bed low and locked, bed alarm on.

## 2022-01-20 NOTE — EICU
Rounding (Video Assessment):  No    Intervention Initiated From:  Bedside    Sharlene Communicated with Bedside Nurse regarding:  Neuro    Nurse Notified:  No    Doctor Notified:  Yes    Comments: bedside nurse called to report pt had seizure type activity. Ativan given and pt calming down now. Informed Dr. Dumont per [facility] nurseShar.

## 2022-01-20 NOTE — PLAN OF CARE
Trach/vent dependent. VSS, sinus tachycardia on monitor (110-120bpm). Multiple episodes of tachypnea (50-70bpm) - mild, temporary relief with ativan/morphine. Discussed with MD, pt on klonopin TID at home - med restarted. EEG completed, no seizure activity noted. K+ replaced per orders. Tolerating TF at goal. Cholecystostomy draining appropriately. Hamilton with adequate UOP. Breakdown to buttocks/elbow noted, dressings CDI. Comfort promoted, safety maintained.

## 2022-01-20 NOTE — PROGRESS NOTES
[facility] Hospital Medicine  Progress Note    Patient Name: Genna Felix  MRN: [id number]  Patient Class: IP- Inpatient   Admission Date: 1/3/2022  Length of Stay: 17 days  Attending Physician: Gabriella Allen MD  Primary Care Provider: Primary Doctor No        Subjective:     Principal Problem:Sepsis        HPI:  Genna Felix is a 61 year old female with a past medical history of anoxic brain injury s/p G tube and tracheostomy, PE, HTN, GERD who presented from [place] for further evaluation of tachycardia.  She was discharged from this facility on 12/27/21 with a diagnosis of pneumonia with rx for bactrim.  In the ED she is found to be tachycardic with WBC 14K, lactic acid 2.6 and evidence of UTI.  CXR reveals worsening multifocal pneumonia.  Pt is nonverbal and cannot contribute to history of present illness, therefore further information is limited.  While in the ED, IV zosyn was initiated and she was give an IVF bolus.  She will be admitted to the service of hospital medicine for continued monitoring and treatment.      Overview/Hospital Course:  61-year-old female with past medical history of anoxic brain injury, PE, hypertension, GERD admitted to the hospital medicine service for UTI and worsening multifocal pneumonia.  She was placed on broad-spectrum antibiotics.  Pulmonologist was consulted.  Urine culture grew presumptive Proteus.  Sputum culture grew Pseudomonas species, Serratia species, Klebsiella pneumoniae (ESBL).  During hospital stay patient noted to have increase gastric residuals.  No definite abdominal obstruction identified.  Patient underwent a CT scan of the abdomen and pelvis which suggested acute cholecystitis for which General surgery team was consulted.  Patient underwent HIDA scan which confirm cystic duct obstruction.  General surgeon recommended percutaneous cholecystostomy drain placement which was done by Interventional Radiology on January 13, 2022.  Services  of infectious diseases specialist are being obtained who upgraded antibiotic therapy to intravenous Avycase.      Interval History:  Patient had tachypnea last night which improved with Ativan.  Remains afebrile.     Review of Systems   Unable to perform ROS: Patient nonverbal     Objective:     Vital Signs (Most Recent):  Temp: 97.34 °F (36.3 °C) (01/20/22 1500)  Pulse: (!) 119 (01/20/22 1500)  Resp: (!) 43 (01/20/22 1500)  BP: 123/69 (01/20/22 1500)  SpO2: 97 % (01/20/22 1500) Vital Signs (24h Range):  Temp:  [87.6 °F (30.9 °C)-97.34 °F (36.3 °C)] 97.34 °F (36.3 °C)  Pulse:  [] 119  Resp:  [0-71] 43  SpO2:  [91 %-100 %] 97 %  BP: ()/(53-77) 123/69     Weight:  (unable to weigh due to bed malfunction. Patient unable to get out of bed to weigh. Ticket being made for bed)  Body mass index is 30.87 kg/m².    Intake/Output Summary (Last 24 hours) at 1/20/2022 1527  Last data filed at 1/20/2022 0644  Gross per 24 hour   Intake 2759.45 ml   Output 1050 ml   Net 1709.45 ml      Physical Exam  Vitals and nursing note reviewed.   Constitutional:       General: She is not in acute distress.     Appearance: She is well-developed. She is not ill-appearing.      Comments: Chronic trach/vent dependent   HENT:      Head: Normocephalic and atraumatic.      Mouth/Throat:      Mouth: Mucous membranes are dry.   Cardiovascular:      Rate and Rhythm: Normal rate and regular rhythm.   Pulmonary:      Effort: Pulmonary effort is normal. No respiratory distress.      Breath sounds: No rhonchi.   Abdominal:      General: Bowel sounds are normal.      Palpations: Abdomen is soft.      Tenderness: There is no abdominal tenderness.      Comments: g tube in place   Musculoskeletal:      Cervical back: Normal range of motion and neck supple.      Comments: Contractures to hands   Skin:     General: Skin is warm and dry.   Neurological:      General: No focal deficit present.      GCS: GCS eye subscore is 1. GCS verbal subscore is  "1. GCS motor subscore is 1.      Cranial Nerves: No cranial nerve deficit.      Motor: No weakness.   Psychiatric:      Comments: Unable to assess         Significant Labs:   All pertinent labs within the past 24 hours have been reviewed.  BMP:   Recent Labs   Lab 01/20/22  0532         K 3.7   CL 99   CO2 27   BUN 13   CREATININE 0.4*   CALCIUM 8.9   MG 1.6     CBC:   Recent Labs   Lab 01/19/22  0726   WBC 3.40*   HGB 8.2*   HCT 27.8*          Significant Imaging: I have reviewed all pertinent imaging results/findings within the past 24 hours.      Assessment/Plan:      * Sepsis  This patient does have evidence of infective focus  My overall impression is sepsis. Vital signs were reviewed and noted in progress note.  Antibiotics given-   Antibiotics (From admission, onward)            Start     Stop Route Frequency Ordered    01/18/22 1430  vancomycin 1.25 g in dextrose 5% 250 mL IVPB (ready to mix)         -- IV Every 18 hours 01/18/22 1400    01/16/22 1253  vancomycin - pharmacy to dose  (vancomycin IVPB)        "And" Linked Group Details    -- IV pharmacy to manage frequency 01/16/22 1153    01/12/22 1930  cefTAZidime-avibactam (AVYCAZ) 2.5 g in dextrose 5 % 100 mL         -- IV Every 8 hours (non-standard times) 01/12/22 1757        Cultures were taken-   Microbiology Results (last 7 days)     Procedure Component Value Units Date/Time    Culture, Anaerobe [[id number]] Collected: 01/13/22 1120    Order Status: Completed Specimen: Body Fluid from Gallbladder Updated: 01/18/22 1441     Anaerobic Culture No anaerobes isolated    Blood culture [[id number]] Collected: 01/12/22 1627    Order Status: Completed Specimen: Blood from Peripheral, Left Hand Updated: 01/17/22 2312     Blood Culture, Routine No growth after 5 days.    Blood culture [[id number]] Collected: 01/12/22 1627    Order Status: Completed Specimen: Blood from Peripheral, Right Hand Updated: 01/17/22 2312     Blood Culture, Routine No " growth after 5 days.    Aerobic culture [[id number]]  (Abnormal)  (Susceptibility) Collected: 01/13/22 1120    Order Status: Completed Specimen: Body Fluid from Gallbladder Updated: 01/17/22 1010     Aerobic Bacterial Culture KLEBSIELLA PNEUMONIAE ESBL  Moderate        ENTEROCOCCUS FAECALIS  Moderate      Culture, Respiratory with Gram Stain [[id number]]  (Abnormal)  (Susceptibility) Collected: 01/05/22 2009    Order Status: Completed Specimen: Respiratory from Endotracheal Aspirate Updated: 01/15/22 1030     Respiratory Culture No S aureus isolated.      SERRATIA MARCESCENS  Many        KLEBSIELLA PNEUMONIAE ESBL  Moderate        PSEUDOMONAS AERUGINOSA   Moderate       Gram Stain (Respiratory) <10 epithelial cells per low power field.     Gram Stain (Respiratory) Rare WBC's     Gram Stain (Respiratory) Few Gram negative rods     Gram Stain (Respiratory) Rare yeast        Latest lactate reviewed, they are-  No results for input(s): LACTATE in the last 72 hours.    Organ dysfunction indicated by tachycardia, tachypnea  Source- respiratory, urine      Source control Achieved by- IV abx      Acute cholecystitis  Status post percutaneous cholecystostomy drain placement on January 13, 2022. Follow General surgery recommendations.  Continue intravenous antibiotic therapy as per ID specialist.        Hyponatremia  resolved      UTI (urinary tract infection)  IV meropenem; follow culture  Microbiology Results (last 7 days)     Procedure Component Value Units Date/Time    Culture, Anaerobe [[id number]] Collected: 01/13/22 1120    Order Status: Completed Specimen: Body Fluid from Gallbladder Updated: 01/18/22 1441     Anaerobic Culture No anaerobes isolated    Blood culture [[id number]] Collected: 01/12/22 1627    Order Status: Completed Specimen: Blood from Peripheral, Left Hand Updated: 01/17/22 2312     Blood Culture, Routine No growth after 5 days.    Blood culture [[id number]] Collected: 01/12/22 1627    Order Status:  Completed Specimen: Blood from Peripheral, Right Hand Updated: 01/17/22 2312     Blood Culture, Routine No growth after 5 days.    Aerobic culture [[id number]]  (Abnormal)  (Susceptibility) Collected: 01/13/22 1120    Order Status: Completed Specimen: Body Fluid from Gallbladder Updated: 01/17/22 1010     Aerobic Bacterial Culture KLEBSIELLA PNEUMONIAE ESBL  Moderate        ENTEROCOCCUS FAECALIS  Moderate      Culture, Respiratory with Gram Stain [[id number]]  (Abnormal)  (Susceptibility) Collected: 01/05/22 2009    Order Status: Completed Specimen: Respiratory from Endotracheal Aspirate Updated: 01/15/22 1030     Respiratory Culture No S aureus isolated.      SERRATIA MARCESCENS  Many        KLEBSIELLA PNEUMONIAE ESBL  Moderate        PSEUDOMONAS AERUGINOSA   Moderate       Gram Stain (Respiratory) <10 epithelial cells per low power field.     Gram Stain (Respiratory) Rare WBC's     Gram Stain (Respiratory) Few Gram negative rods     Gram Stain (Respiratory) Rare yeast                Pressure injury of left buttock, stage 4  Consult wound care  Turn q2h        Ventilator associated pneumonia  Failed OP abx  Pt vent dependent-consult pulmonology for vent management  On intravenous meropenem and vancomycin.  Blood and sputum cx  Nebulizer treatments        Gastroesophageal reflux disease  Chronic; utilize pepcid acutely.      Essential hypertension  Chronic, stable.  Latest blood pressure and vitals reviewed-   Temp:  [96.08 °F (35.6 °C)-98.24 °F (36.8 °C)]   Pulse:  []   Resp:  [10-75]   BP: (113-160)/(62-98)   SpO2:  [94 %-99 %] .   Home meds for hypertension were reviewed and noted below.   No chronic antihypertensives noted. Review of home meds reveals midodrine which has been reordered.      Will utilize p.r.n. blood pressure medication only if patient's blood pressure greater than  180/110 and she develops symptoms such as worsening chest pain or shortness of breath.        Malnutrition of moderate  degree  Nutrition consult.      Acute on chronic respiratory failure with hypoxia  Patient with Hypoxic Respiratory failure which is Chronic.  she is vent dependent. Supplemental oxygen was provided and noted- Vent Mode: SIMV-VC-PS  Oxygen Concentration (%):  [] 30  Resp Rate Total:  [24 br/min-48 br/min] 24 br/min  Vt Set:  [350 mL] 350 mL  PEEP/CPAP:  [5 cmH20-5.4 cmH20] 5.4 cmH20  Pressure Support:  [10 cmH20] 10 cmH20  Mean Airway Pressure:  [15.3 cmH20] 15.3 cmH20.   Signs/symptoms of respiratory failure include- tachypnea and increased work of breathing. Contributing diagnoses includes - Aspiration and Pneumonia Labs and images were reviewed. Patient Has not had a recent ABG. Will treat underlying causes.    PEG (percutaneous endoscopic gastrostomy) status  Noted; care per nursing.  TF per nutrition recs.  Slowly advance PEG tube feeding.        Tracheostomy dependence  Noted; care per nursing      Anoxic brain injury  Noted.  Turn q2h      Anemia of chronic disease  Patient's anemia is currently controlled. S/p 0 units of PRBCs.  Current CBC reviewed-   Lab Results   Component Value Date    HGB 8.2 (L) 01/06/2022    HCT 26.9 (L) 01/06/2022    MCV 92 01/06/2022     01/06/2022     Monitor serial CBC and transfuse if patient becomes hemodynamically unstable, symptomatic or H/H drops below 7/21.           VTE Risk Mitigation (From admission, onward)         Ordered     enoxaparin injection 40 mg  Daily         01/04/22 0034     IP VTE HIGH RISK PATIENT  Once         01/04/22 0034     Place sequential compression device  Until discontinued         01/04/22 0034                Discharge Planning   [place]: 1/23/2022     Code Status: Full Code   Is the patient medically ready for discharge?:     Reason for patient still in hospital (select all that apply): Patient trending condition and Treatment  Discharge Plan A: Long-term acute care facility (LTAC)            Critical care time spent on the evaluation  and treatment of severe organ dysfunction, review of pertinent labs and imaging studies, discussions with consulting providers and discussions with patient/family: 60 minutes.      Gabriella Allen MD  Department of Hospital Medicine   [facility]

## 2022-01-20 NOTE — EICU
EICU FOLLOWUP NOTE:    Called for:  ? Seizures    CAMERA ASSESSMENT: Two way audiovisual assessment was done: No distess    Telemetry was reviewed. Medical records including notes, labs and imaging were reviewed.    DISCUSSED with bedside nurse     ASSESSMENT AND PLAN:    Seizures ?  Has Hx of Anoxic brain injury. Bedside RN noticed that she has her eyes rolled back into her head, and had increase in respirations (60 per bedside). Was given Ativan 2mg IVP with improvement  --Keppra 1G x 1  --CT head  --EEG    Thank You for allowing EICU to participate in the care of the patient. Please call as needed    Dimas Dumont MD  [facility]  [phone]

## 2022-01-20 NOTE — PHYSICIAN QUERY
PT Name: Genna Felix  MR #: [id number]     DOCUMENTATION CLARIFICATION      CDS/: Jayla Jiménez               Contact information:  This form is a permanent document in the medical record.    Query Date: January 20, 2022    By submitting this query, we are merely seeking further clarification of documentation to reflect the severity of illness of your patient. Please utilize your independent clinical judgment when addressing the question(s) below.     The Medical Record contains the following:   Indicators   Supporting Clinical Findings Location in Medical Record   X Documentation of Sepsis, Septic Shock Sepsis HM-This patient does have evidence of infective focusMy overall impression is sepsis.  Organ dysfunction indicated by tachycardia, tachypnea  Source- respiratory, urine      Sepsis-urosepsis and/or healthcare associated pneumonia.         Sepsis resolved, multifactorial; right pneumonia/UTI/acute cholecystitis   s/p IR guided perc ирина 1/13  Bilious fluid cultures in process  Urine culture grew Proteus mirabilis likely ESBL  Blood cultures from admission 1/4 bottles grew CoNS, likely a contaminant  Respiratory culture 1/5 with multiple organisms; Serratia marcescens, ESBL Klebsiella pneumoniae, and  Pseudomonas, the latter susceptible to Avycaz  Procalcitonin 0.57     Hospital Medicine, 1/5            eICU 1/4         ID 1/14      Blood Culture     X Respiratory Culture Serratia Marcescens-many  Klebsiella Pneumoniae ESBL-Moderate  PSEUDOMONAS AERUGINOSA -Moderate   1/5 Lab   X Urine Culture Proteus mirabilis >100,000 cfu/ml   1/3 Lab   X Other Culture Gallbladder Body Fluid  Klebsiella Pneumoniae ESBL-Moderate  Enterococcus Faecalis-Moderate   Aerobic culture 1/13   X Acute/Chronic Illness Sepsis, Hyponatremia, UTI, Pressure ulcer of left buttock stage 4, Ventilator associated pneumonia, GERD, HTN, moderate malnutrition Chronic RF with hypoxia, PEG. Tracheostomy dependence, anoxic brain injury,  Functional quadriplegia, anemia of chronic disease    [facility] Medicine, 1/4   X Medication/Treatment Source control Achieved by- IV abx  Cultures    Plan for IR guided percutaneous cholecystostomy  CRP and procalcitonin   Blood cultures x 2 sets  Dc Merrem   Start Avycaz 2.5g IV q8h, lab called to set up plates for ESBL Klebsiella and Serratia   Artificial tears 3 times a day both eyes  Aspiration precautions  Oral hygiene  Follow cultures   [facility] Medicine, 1/5      ID 1/12    Other        Provider, please further specify the Sepsis diagnosis:  Gordon all that apply:  [  x ] Due to Enterococcus Faecalis   [  x ] Due to K. Pneumoniae   [   ] Due to Serratia Marcescens   [x   ] Due to Due to Pseudomonas   [   ] Due to Proteus Mirabilis   [   ] Due to (please specify): __________________________________   [   ] Other specification (please specify): ____________________   [   ] Clinically Undetermined     Please document in your progress notes daily for the duration of treatment until resolved, and include in your discharge summary.  Form No. [id number]

## 2022-01-20 NOTE — NURSING
Pt with another episode of tachypnea, breathing 50-65 bpm. Episode started when stimulated for EEG (panic attack?). VSS, sinus tachycardia (). Morphine administered with no relief. Notified Dr. Allen, orders to give another 2mg ativan now.

## 2022-01-20 NOTE — SUBJECTIVE & OBJECTIVE
Interval History:  Patient had tachypnea last night which improved with Ativan.  Remains afebrile.     Review of Systems   Unable to perform ROS: Patient nonverbal     Objective:     Vital Signs (Most Recent):  Temp: 97.34 °F (36.3 °C) (01/20/22 1500)  Pulse: (!) 119 (01/20/22 1500)  Resp: (!) 43 (01/20/22 1500)  BP: 123/69 (01/20/22 1500)  SpO2: 97 % (01/20/22 1500) Vital Signs (24h Range):  Temp:  [87.6 °F (30.9 °C)-97.34 °F (36.3 °C)] 97.34 °F (36.3 °C)  Pulse:  [] 119  Resp:  [0-71] 43  SpO2:  [91 %-100 %] 97 %  BP: ()/(53-77) 123/69     Weight:  (unable to weigh due to bed malfunction. Patient unable to get out of bed to weigh. Ticket being made for bed)  Body mass index is 30.87 kg/m².    Intake/Output Summary (Last 24 hours) at 1/20/2022 1527  Last data filed at 1/20/2022 0644  Gross per 24 hour   Intake 2759.45 ml   Output 1050 ml   Net 1709.45 ml      Physical Exam  Vitals and nursing note reviewed.   Constitutional:       General: She is not in acute distress.     Appearance: She is well-developed. She is not ill-appearing.      Comments: Chronic trach/vent dependent   HENT:      Head: Normocephalic and atraumatic.      Mouth/Throat:      Mouth: Mucous membranes are dry.   Cardiovascular:      Rate and Rhythm: Normal rate and regular rhythm.   Pulmonary:      Effort: Pulmonary effort is normal. No respiratory distress.      Breath sounds: No rhonchi.   Abdominal:      General: Bowel sounds are normal.      Palpations: Abdomen is soft.      Tenderness: There is no abdominal tenderness.      Comments: g tube in place   Musculoskeletal:      Cervical back: Normal range of motion and neck supple.      Comments: Contractures to hands   Skin:     General: Skin is warm and dry.   Neurological:      General: No focal deficit present.      GCS: GCS eye subscore is 1. GCS verbal subscore is 1. GCS motor subscore is 1.      Cranial Nerves: No cranial nerve deficit.      Motor: No weakness.   Psychiatric:       Comments: Unable to assess         Significant Labs:   All pertinent labs within the past 24 hours have been reviewed.  BMP:   Recent Labs   Lab 01/20/22  0532         K 3.7   CL 99   CO2 27   BUN 13   CREATININE 0.4*   CALCIUM 8.9   MG 1.6     CBC:   Recent Labs   Lab 01/19/22  0726   WBC 3.40*   HGB 8.2*   HCT 27.8*          Significant Imaging: I have reviewed all pertinent imaging results/findings within the past 24 hours.

## 2022-01-20 NOTE — PROGRESS NOTES
Progress Note  PULMONARY    Admit Date: 1/3/2022   01/20/2022  From Dr Montesinos's consult:  History of Present Illness:  Pt is a 62 yo female with anoxic brain injury, chronic respiratory failure with vent dependence, PEG, cirrhosis, GERD, HTN, seizure disorder, resident of [facility] who presented to the ED for evaluation of tachycardia. Pt is admitted to the ICU with sepsis and pneumonia. She is requiring precedex drip for agitation/tachypnea. She is receiving fluid resuscitation and vanco/zosyn for pneumonia.    SUBJECTIVE:     1/5- no new issues  1/6- no new issues  1/7- remains on vent, GNR on sputum culture, CXR better. Per RN multiple episodes emesis so tube feeds are held  1/8- no new issues  1/9- no new issues  1/10- no new issues  1/11- yesterday had emesis, aspiration event. abg looks ok. Remains on vent  1/12- no new issues. Per nurse there is plan for cholecystectomy?  1/13- remains on vent, afebrile, vs stable. S/p IR PCT this am  1/14 no c/o on vent  1/15- no new issues, continues on vent, tolerating tube feeds  1/19- continues on vent, no changes. Has episodes of tachypnea without desaturation- sometimes requires morphine or ativan  1/20 - no new issues. Keeps having episodes of tachypnea improved w/ ativan    OBJECTIVE:     Vitals (Most recent):  Vitals:    01/20/22 0815   BP:    Pulse: 96   Resp: (!) 45   Temp: (!) 93.74 °F (34.3 °C)       Vitals (24 hour range):  Temp:  [87.6 °F (30.9 °C)-97.1 °F (36.2 °C)]   Pulse:  [57-96]   Resp:  [0-71]   BP: ()/(53-77)   SpO2:  [91 %-100 %]       Intake/Output Summary (Last 24 hours) at 1/20/2022 0831  Last data filed at 1/20/2022 0644  Gross per 24 hour   Intake 2759.45 ml   Output 1050 ml   Net 1709.45 ml          Physical Exam:  The patient's neuro status (alertness,orientation,cognitive function,motor skills,), pharyngeal exam (oral lesions, hygiene, abn dentition,), Neck (jvd,mass,thyroid,nodes in neck and above/below  clavicle),RESPIRATORY(symmetry,effort,fremitus,percussion,auscultation),  Cor(rhythm,heart tones including gallops,perfusion,edema)ABD(distention,hepatic&splenomegaly,tenderness,masses), Skin(rash,cyanosis),Psyc(affect,judgement,).  Exam negative except for these pertinent findings:     opens eyes, calm  Trach w vent  Bilateral ventilated breath sounds  Abdomen soft, distended, hypoactive bowel sounds, PEG in place  Percutaneous ángel drain in place, thick yellow material in bag  Contracted upper ext  No edema  Hamilton in place yellow urine         Radiographs reviewed: view by direct vision   KUB 1/18/22-      Percutaneous cholecystotomy tube remains in position.  Gastrostomy tube remains in position.  Otherwise negative abdomen.    HIDA scan 1/11-   1. Findings are suggestive of cystic duct obstruction.  2. Common bile duct is patent.  CXR 1/9- worsened appearance R mid/lower lung infiltrates  CXR 1/7- improved infiltrates  CXR 1/3/22- bilateral lower lobe infiltrates, worsened compared to previous film    Labs     No results for input(s): WBC, HGB, HCT, PLT, BAND, METAMYELOCYT, MYELOPCT, HGBA1C in the last 24 hours.  Recent Labs   Lab 01/20/22  0532      K 3.7   CL 99   CO2 27   BUN 13   CREATININE 0.4*      CALCIUM 8.9   MG 1.6   PHOS 4.0   AST 16   ALT 13   ALKPHOS 134   BILITOT 0.4   PROT 6.9   ALBUMIN 2.3*     No results for input(s): PH, PCO2, PO2, HCO3 in the last 24 hours.  Microbiology Results (last 7 days)     Procedure Component Value Units Date/Time    Culture, Anaerobe [[id number]] Collected: 01/13/22 1120    Order Status: Completed Specimen: Body Fluid from Gallbladder Updated: 01/18/22 1441     Anaerobic Culture No anaerobes isolated    Blood culture [[id number]] Collected: 01/12/22 1627    Order Status: Completed Specimen: Blood from Peripheral, Left Hand Updated: 01/17/22 2312     Blood Culture, Routine No growth after 5 days.    Blood culture [[id number]] Collected: 01/12/22 1627    Order  Status: Completed Specimen: Blood from Peripheral, Right Hand Updated: 01/17/22 2312     Blood Culture, Routine No growth after 5 days.    Aerobic culture [[id number]]  (Abnormal)  (Susceptibility) Collected: 01/13/22 1120    Order Status: Completed Specimen: Body Fluid from Gallbladder Updated: 01/17/22 1010     Aerobic Bacterial Culture KLEBSIELLA PNEUMONIAE ESBL  Moderate        ENTEROCOCCUS FAECALIS  Moderate      Culture, Respiratory with Gram Stain [[id number]]  (Abnormal)  (Susceptibility) Collected: 01/05/22 2009    Order Status: Completed Specimen: Respiratory from Endotracheal Aspirate Updated: 01/15/22 1030     Respiratory Culture No S aureus isolated.      SERRATIA MARCESCENS  Many        KLEBSIELLA PNEUMONIAE ESBL  Moderate        PSEUDOMONAS AERUGINOSA   Moderate       Gram Stain (Respiratory) <10 epithelial cells per low power field.     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  Added automatically from request for surgery [id number]        Resolved Hospital Problems   No resolved problems to display.               Plan:     - continue vent,  no weaning  - episodes of tachypnea w/ stimulation suspect behavioral/neuro related- getting EEG today  - lovenox subq for DVT prophylaxis  - pepcid for GI prophylaxis  - wound care   - continue midodrine  - monitor Hgb  - tube feeds  - s/p IR PCT for cholecystitis  - antibiotics per ID  - pulm will follow peripherally, please call if questions arise    Dora Montesinos MD  Pulmonary & Critical Care Medicine

## 2022-01-21 VITALS
TEMPERATURE: 97 F | OXYGEN SATURATION: 95 % | HEIGHT: 60 IN | BODY MASS INDEX: 31.03 KG/M2 | SYSTOLIC BLOOD PRESSURE: 110 MMHG | HEART RATE: 87 BPM | DIASTOLIC BLOOD PRESSURE: 69 MMHG | WEIGHT: 158.06 LBS | RESPIRATION RATE: 24 BRPM

## 2022-01-21 LAB
ALBUMIN SERPL BCP-MCNC: 2.3 G/DL (ref 3.5–5.2)
ALP SERPL-CCNC: 131 U/L (ref 55–135)
ALT SERPL W/O P-5'-P-CCNC: 14 U/L (ref 10–44)
ANION GAP SERPL CALC-SCNC: 9 MMOL/L (ref 8–16)
AST SERPL-CCNC: 17 U/L (ref 10–40)
BILIRUB SERPL-MCNC: 0.5 MG/DL (ref 0.1–1)
BUN SERPL-MCNC: 13 MG/DL (ref 6–20)
CALCIUM SERPL-MCNC: 8.9 MG/DL (ref 8.7–10.5)
CHLORIDE SERPL-SCNC: 102 MMOL/L (ref 95–110)
CO2 SERPL-SCNC: 27 MMOL/L (ref 23–29)
CREAT SERPL-MCNC: 0.5 MG/DL (ref 0.5–1.4)
EST. GFR  (AFRICAN AMERICAN): >60 ML/MIN/1.73 M^2
EST. GFR  (NON AFRICAN AMERICAN): >60 ML/MIN/1.73 M^2
GLUCOSE SERPL-MCNC: 83 MG/DL (ref 70–110)
MAGNESIUM SERPL-MCNC: 1.8 MG/DL (ref 1.6–2.6)
PHOSPHATE SERPL-MCNC: 3.8 MG/DL (ref 2.7–4.5)
POCT GLUCOSE: 105 MG/DL (ref 70–110)
POCT GLUCOSE: 73 MG/DL (ref 70–110)
POCT GLUCOSE: 84 MG/DL (ref 70–110)
POCT GLUCOSE: 84 MG/DL (ref 70–110)
POTASSIUM SERPL-SCNC: 4.7 MMOL/L (ref 3.5–5.1)
PROT SERPL-MCNC: 7.1 G/DL (ref 6–8.4)
SODIUM SERPL-SCNC: 138 MMOL/L (ref 136–145)
VANCOMYCIN SERPL-MCNC: 17.3 UG/ML

## 2022-01-21 PROCEDURE — 25000003 PHARM REV CODE 250: Performed by: INTERNAL MEDICINE

## 2022-01-21 PROCEDURE — 99900026 HC AIRWAY MAINTENANCE (STAT)

## 2022-01-21 PROCEDURE — 63600175 PHARM REV CODE 636 W HCPCS: Performed by: INTERNAL MEDICINE

## 2022-01-21 PROCEDURE — 94003 VENT MGMT INPAT SUBQ DAY: CPT

## 2022-01-21 PROCEDURE — 63600175 PHARM REV CODE 636 W HCPCS: Performed by: NURSE PRACTITIONER

## 2022-01-21 PROCEDURE — 27000221 HC OXYGEN, UP TO 24 HOURS

## 2022-01-21 PROCEDURE — 99900035 HC TECH TIME PER 15 MIN (STAT)

## 2022-01-21 PROCEDURE — 83735 ASSAY OF MAGNESIUM: CPT | Performed by: NURSE PRACTITIONER

## 2022-01-21 PROCEDURE — 95819 EEG AWAKE AND ASLEEP: CPT

## 2022-01-21 PROCEDURE — 80053 COMPREHEN METABOLIC PANEL: CPT | Performed by: NURSE PRACTITIONER

## 2022-01-21 PROCEDURE — 80202 ASSAY OF VANCOMYCIN: CPT | Performed by: INTERNAL MEDICINE

## 2022-01-21 PROCEDURE — 94640 AIRWAY INHALATION TREATMENT: CPT

## 2022-01-21 PROCEDURE — 25000003 PHARM REV CODE 250: Performed by: SURGERY

## 2022-01-21 PROCEDURE — 25000003 PHARM REV CODE 250: Performed by: NURSE PRACTITIONER

## 2022-01-21 PROCEDURE — 84100 ASSAY OF PHOSPHORUS: CPT | Performed by: NURSE PRACTITIONER

## 2022-01-21 PROCEDURE — 94761 N-INVAS EAR/PLS OXIMETRY MLT: CPT

## 2022-01-21 PROCEDURE — 25000242 PHARM REV CODE 250 ALT 637 W/ HCPCS: Performed by: NURSE PRACTITIONER

## 2022-01-21 RX ORDER — MEROPENEM AND SODIUM CHLORIDE 500 MG/50ML
500 INJECTION, SOLUTION INTRAVENOUS EVERY 8 HOURS
Status: ON HOLD
Start: 2022-01-21 | End: 2022-03-25 | Stop reason: HOSPADM

## 2022-01-21 RX ADMIN — MIDODRINE HYDROCHLORIDE 5 MG: 5 TABLET ORAL at 02:01

## 2022-01-21 RX ADMIN — HYPROMELLOSE 2910 1 DROP: 5 SOLUTION OPHTHALMIC at 03:01

## 2022-01-21 RX ADMIN — MICONAZOLE NITRATE: 20 CREAM TOPICAL at 09:01

## 2022-01-21 RX ADMIN — ENOXAPARIN SODIUM 40 MG: 40 INJECTION SUBCUTANEOUS at 04:01

## 2022-01-21 RX ADMIN — MEROPENEM AND SODIUM CHLORIDE 500 MG: 500 INJECTION, SOLUTION INTRAVENOUS at 07:01

## 2022-01-21 RX ADMIN — IPRATROPIUM BROMIDE AND ALBUTEROL SULFATE 3 ML: 2.5; .5 SOLUTION RESPIRATORY (INHALATION) at 07:01

## 2022-01-21 RX ADMIN — CLONAZEPAM 0.5 MG: 0.5 TABLET ORAL at 08:01

## 2022-01-21 RX ADMIN — IPRATROPIUM BROMIDE AND ALBUTEROL SULFATE 3 ML: 2.5; .5 SOLUTION RESPIRATORY (INHALATION) at 03:01

## 2022-01-21 RX ADMIN — IPRATROPIUM BROMIDE AND ALBUTEROL SULFATE 3 ML: 2.5; .5 SOLUTION RESPIRATORY (INHALATION) at 12:01

## 2022-01-21 RX ADMIN — FAMOTIDINE 20 MG: 10 INJECTION INTRAVENOUS at 08:01

## 2022-01-21 RX ADMIN — MIDODRINE HYDROCHLORIDE 5 MG: 5 TABLET ORAL at 08:01

## 2022-01-21 RX ADMIN — HYPROMELLOSE 2910 1 DROP: 5 SOLUTION OPHTHALMIC at 09:01

## 2022-01-21 RX ADMIN — IPRATROPIUM BROMIDE AND ALBUTEROL SULFATE 3 ML: 2.5; .5 SOLUTION RESPIRATORY (INHALATION) at 04:01

## 2022-01-21 RX ADMIN — CHLORHEXIDINE GLUCONATE 0.12% ORAL RINSE 15 ML: 1.2 LIQUID ORAL at 09:01

## 2022-01-21 RX ADMIN — LORAZEPAM 2 MG: 2 INJECTION INTRAMUSCULAR; INTRAVENOUS at 10:01

## 2022-01-21 RX ADMIN — MEROPENEM AND SODIUM CHLORIDE 500 MG: 500 INJECTION, SOLUTION INTRAVENOUS at 12:01

## 2022-01-21 RX ADMIN — IPRATROPIUM BROMIDE AND ALBUTEROL SULFATE 3 ML: 2.5; .5 SOLUTION RESPIRATORY (INHALATION) at 11:01

## 2022-01-21 RX ADMIN — CLONAZEPAM 0.5 MG: 0.5 TABLET ORAL at 02:01

## 2022-01-21 RX ADMIN — VANCOMYCIN HYDROCHLORIDE 1250 MG: 1.25 INJECTION, POWDER, LYOPHILIZED, FOR SOLUTION INTRAVENOUS at 06:01

## 2022-01-21 RX ADMIN — ONDANSETRON 4 MG: 2 INJECTION INTRAMUSCULAR; INTRAVENOUS at 08:01

## 2022-01-21 RX ADMIN — MEROPENEM AND SODIUM CHLORIDE 500 MG: 500 INJECTION, SOLUTION INTRAVENOUS at 03:01

## 2022-01-21 NOTE — CARE UPDATE
01/21/22 0714   Preset Conventional Ventilator Settings   Vent Type NKV-550   Ventilation Type VC   Vent Mode SIMV-VC-PS   Humidity HME   Set Rate 24 BPM   Vt Set 350 mL   PEEP/CPAP 5 cmH20   Pressure Support 10 cmH20   Waveform RAMP   Insp Time 0.8 Sec(s)   Fio2 30% and resp txs Q4 hrs with [facility] Qshift

## 2022-01-21 NOTE — PLAN OF CARE
Patient cleared for discharge from case management standpoint.       01/21/22 1251   Final Note   Assessment Type Final Discharge Note   Anticipated Discharge Disposition LTAC   Hospital Resources/Appts/Education Provided Post-Acute resouces added to AVS;Provided education on problems/symptoms using teachback;Appointments scheduled and added to AVS;Provided patient/caregiver with written discharge plan information

## 2022-01-21 NOTE — PLAN OF CARE
JOO sent discharge information to [facility] via [employer] system.  Per Justine with [facility], [facility] patient today.  JOO spoke to patient's sister Amelia via telephone who is in agreement with patient transferring to LTAC with [facility].  Patient choice completed.       01/21/22 1149   Post-Acute Status   Post-Acute Authorization Placement   Post-Acute Placement Status Pending post-acute provider review/more information requested

## 2022-01-21 NOTE — PLAN OF CARE
Per Justine with [facility], patient accepted and patient's nurse can call report to Margaux at [phone].  Patient to transport by [facility] with scheduled  time NOW.       01/21/22 1249   Post-Acute Status   Post-Acute Authorization Placement   Post-Acute Placement Status Set-up Complete/Auth obtained

## 2022-01-21 NOTE — PLAN OF CARE
[facility]  Discharge Reassessment    Primary Care Provider: Primary Doctor No    Expected Discharge Date: 1/22/2022     Per MD, patient discharge canceled today due to patient's vomiting and orders for KUB.  JOO spoke to Justine with [facility] who stated they can accept patient tomorrow.  JOO spoke to Juventino with [facility] to cancel transportation.  SW to contact [facility] [phone] to inform of discharge tomorrow and fax updated orders to [phone].    Reassessment (most recent)     Discharge Reassessment - 01/21/22 1337        Discharge Reassessment    Assessment Type Discharge Planning Reassessment     Did the patient's condition or plan change since previous assessment? Yes     Discharge Plan discussed with: Sibling     Communicated [place] with patient/caregiver Yes     Discharge Plan A Long-term acute care facility (LTAC)     Discharge Plan B Home Health     DME Needed Upon Discharge  none     Discharge Barriers Identified None     Why the patient remains in the hospital Requires continued medical care        Post-Acute Status    Post-Acute Authorization Placement     Post-Acute Placement Status Set-up Complete/Auth obtained

## 2022-01-21 NOTE — PROGRESS NOTES
Pharmacokinetic Assessment Follow Up: IV Vancomycin    Vancomycin serum concentration assessment(s):    The trough level was drawn correctly and can be used to guide therapy at this time. The measurement is above the desired definitive target range of 15 to 20 mcg/mL.    Vancomycin Regimen Plan:    Discontinue the scheduled vancomycin regimen and re-dose when the random level is less than 20 mcg/mL, next level to be drawn at 01/21 on 0430 with am labs .    Drug levels (last 3 results):  Recent Labs   Lab Result Units 01/18/22  1243 01/19/22  0726 01/20/22  1947   Vancomycin, Random ug/mL 13.2  --   --    Vancomycin-Trough ug/mL  --  14.3 23.5*       Pharmacy will continue to follow and monitor vancomycin.    Please contact pharmacy at extension [phone] for questions regarding this assessment.    Thank you for the consult,   Tray Suresh       Patient brief summary:  Genna Felix is a 61 y.o. female initiated on antimicrobial therapy with IV Vancomycin for treatment of intra-abdominal infection    Drug Allergies:   Review of patient's allergies indicates:  No Known Allergies    Actual Body Weight:   71.7 kg    Renal Function:   Estimated Creatinine Clearance: 133.9 mL/min (A) (based on SCr of 0.4 mg/dL (L)).,     Dialysis Method (if applicable):  N/A    CBC (last 72 hours):  Recent Labs   Lab Result Units 01/19/22  0726   WBC K/uL 3.40*   Hemoglobin g/dL 8.2*   Hematocrit % 27.8*   Platelets K/uL 400   Gran % % 57.9   Lymph % % 25.6   Mono % % 8.8   Eosinophil % % 6.8   Basophil % % 0.6   Differential Method  Automated       Metabolic Panel (last 72 hours):  Recent Labs   Lab Result Units 01/18/22  0323 01/19/22  0319 01/20/22  0532   Sodium mmol/L 137 134* 136   Potassium mmol/L 3.8 4.5 3.7   Chloride mmol/L 98 98 99   CO2 mmol/L 27 24 27   Glucose mg/dL 107 94 105   BUN mg/dL 10 14 13   Creatinine mg/dL 0.4* 0.4* 0.4*   Albumin g/dL 2.3* 2.4* 2.3*   Total Bilirubin mg/dL 0.4 0.5 0.4   Alkaline Phosphatase U/L 148* 145*  134   AST U/L 16 19 16   ALT U/L 13 15 13   Magnesium mg/dL 1.7 1.7 1.6   Phosphorus mg/dL 3.2 3.9 4.0       Vancomycin Administrations:  vancomycin given in the last 96 hours                     vancomycin 1.25 g in dextrose 5% 250 mL IVPB (ready to mix) ()  Restarted 01/20/22 0349     1,250 mg New Bag  0244     1,250 mg New Bag 01/19/22 0831     1,250 mg New Bag 01/18/22 1418    vancomycin in dextrose 5 % 1 gram/250 mL IVPB 1,000 mg ()  Restarted 01/17/22 2114      Restarted  2023     1,000 mg New Bag  2021    vancomycin 1.25 g in dextrose 5% 250 mL IVPB (ready to mix) ()  Restarted 01/17/22 0214     1,250 mg New Bag  0052                    Microbiologic Results:  Microbiology Results (last 7 days)       Procedure Component Value Units Date/Time    Culture, Anaerobe [[id number]] Collected: 01/13/22 1120    Order Status: Completed Specimen: Body Fluid from Gallbladder Updated: 01/18/22 1441     Anaerobic Culture No anaerobes isolated    Blood culture [[id number]] Collected: 01/12/22 1627    Order Status: Completed Specimen: Blood from Peripheral, Left Hand Updated: 01/17/22 2312     Blood Culture, Routine No growth after 5 days.    Blood culture [[id number]] Collected: 01/12/22 1627    Order Status: Completed Specimen: Blood from Peripheral, Right Hand Updated: 01/17/22 2312     Blood Culture, Routine No growth after 5 days.    Aerobic culture [[id number]]  (Abnormal)  (Susceptibility) Collected: 01/13/22 1120    Order Status: Completed Specimen: Body Fluid from Gallbladder Updated: 01/17/22 1010     Aerobic Bacterial Culture KLEBSIELLA PNEUMONIAE ESBL  Moderate        ENTEROCOCCUS FAECALIS  Moderate      Culture, Respiratory with Gram Stain [[id number]]  (Abnormal)  (Susceptibility) Collected: 01/05/22 2009    Order Status: Completed Specimen: Respiratory from Endotracheal Aspirate Updated: 01/15/22 1030     Respiratory Culture No S aureus isolated.      SERRATIA MARCESCENS  Many        KLEBSIELLA PNEUMONIAE  ESBL  Moderate        PSEUDOMONAS AERUGINOSA   Moderate       Gram Stain (Respiratory) <10 epithelial cells per low power field.     Gram Stain (Respiratory) Rare WBC's     Gram Stain (Respiratory) Few Gram negative rods     Gram Stain (Respiratory) Rare yeast

## 2022-01-21 NOTE — PLAN OF CARE
Problem: Communication Impairment (Mechanical Ventilation, Invasive)  Goal: Effective Communication  Outcome: Ongoing, Progressing     Problem: Device-Related Complication Risk (Mechanical Ventilation, Invasive)  Goal: Optimal Device Function  Outcome: Ongoing, Progressing     Problem: Inability to Wean (Mechanical Ventilation, Invasive)  Goal: Mechanical Ventilation Liberation  Outcome: Ongoing, Progressing     Problem: Nutrition Impairment (Mechanical Ventilation, Invasive)  Goal: Optimal Nutrition Delivery  Outcome: Ongoing, Progressing     Problem: Skin and Tissue Injury (Mechanical Ventilation, Invasive)  Goal: Absence of Device-Related Skin and Tissue Injury  Outcome: Ongoing, Progressing     Problem: Ventilator-Induced Lung Injury (Mechanical Ventilation, Invasive)  Goal: Absence of Ventilator-Induced Lung Injury  Outcome: Ongoing, Progressing     Problem: Communication Impairment (Artificial Airway)  Goal: Effective Communication  Outcome: Ongoing, Progressing     Problem: Device-Related Complication Risk (Artificial Airway)  Goal: Optimal Device Function  Outcome: Ongoing, Progressing     Problem: Skin and Tissue Injury (Artificial Airway)  Goal: Absence of Device-Related Skin or Tissue Injury  Outcome: Ongoing, Progressing     Problem: Noninvasive Ventilation Acute  Goal: Effective Unassisted Ventilation and Oxygenation  Outcome: Ongoing, Progressing     Problem: Adult Inpatient Plan of Care  Goal: Plan of Care Review  Outcome: Ongoing, Progressing  Goal: Patient-Specific Goal (Individualized)  Outcome: Ongoing, Progressing  Goal: Absence of Hospital-Acquired Illness or Injury  Outcome: Ongoing, Progressing  Goal: Optimal Comfort and Wellbeing  Outcome: Ongoing, Progressing  Goal: Readiness for Transition of Care  Outcome: Ongoing, Progressing     Problem: Adjustment to Illness (Delirium)  Goal: Optimal Coping  Outcome: Ongoing, Progressing     Problem: Altered Behavior (Delirium)  Goal: Improved  Behavioral Control  Outcome: Ongoing, Progressing     Problem: Attention and Thought Clarity Impairment (Delirium)  Goal: Improved Attention and Thought Clarity  Outcome: Ongoing, Progressing     Problem: Sleep Disturbance (Delirium)  Goal: Improved Sleep  Outcome: Ongoing, Progressing     Problem: Adjustment to Illness (Sepsis/Septic Shock)  Goal: Optimal Coping  Outcome: Ongoing, Progressing     Problem: Bleeding (Sepsis/Septic Shock)  Goal: Absence of Bleeding  Outcome: Ongoing, Progressing     Problem: Glycemic Control Impaired (Sepsis/Septic Shock)  Goal: Blood Glucose Level Within Desired Range  Outcome: Ongoing, Progressing     Problem: Infection Progression (Sepsis/Septic Shock)  Goal: Absence of Infection Signs and Symptoms  Outcome: Ongoing, Progressing     Problem: Nutrition Impaired (Sepsis/Septic Shock)  Goal: Optimal Nutrition Intake  Outcome: Ongoing, Progressing     Problem: Impaired Wound Healing  Goal: Optimal Wound Healing  Outcome: Ongoing, Progressing     Problem: Fall Injury Risk  Goal: Absence of Fall and Fall-Related Injury  Outcome: Ongoing, Progressing     Problem: Skin Injury Risk Increased  Goal: Skin Health and Integrity  Outcome: Ongoing, Progressing     Problem: Infection  Goal: Absence of Infection Signs and Symptoms  Outcome: Ongoing, Progressing     Problem: Aspiration (Enteral Nutrition)  Goal: Absence of Aspiration Signs and Symptoms  Outcome: Ongoing, Progressing     Problem: Device-Related Complication Risk (Enteral Nutrition)  Goal: Safe, Effective Therapy Delivery  Outcome: Ongoing, Progressing     Problem: Feeding Intolerance (Enteral Nutrition)  Goal: Feeding Tolerance  Outcome: Ongoing, Progressing    Chronic vent dependent patient with trach. Continues to have episodes of tachypnea that is resolved with ativan. Patient restarted on her home medication Klonopin yesterday on day shift. Blood sugars monitored and currently controled. Tube feedings at goal. Residuals are good.  Patient did have 2 episodes of emesis during shift. Small amount of yellow vomit noted. Zofran given. Hamilton catheter with 500 mL of urine output. Biliary drain in place to right abdomen.  20 mL of output from drain. Temperature being regulated with Bear hugger. Vent settings continued as ordered. Safety maintained. Bed low and locked with bed alarm on.

## 2022-01-21 NOTE — PROGRESS NOTES
Progress Note  Infectious Disease    Reason for Consult:  Acute cholecystitis/ pneumonia    HPI: Genna Felix is a 61 y.o. female resident of [facility], with past medical history of anoxic brain injury status post tracheostomy/peg tube, prior PE, hypertension, GERD sent for tachycardia.  Patient with prior admission to [facility] for pneumonia, discharged 12/27/21 on Bactrim.  Information obtained from medical chart.  Patient is nonverbal at baseline, no family present.  In the ER, tachycardic, trach to vent.   Lab significant for WBC of 14, PMN:  87.5%  UA grossly positive, urine culture grew Proteus mirabilis likely ESBL.  Blood cultures from admission 1/4 bottles grew CoNS, likely a contaminant.  Chest x-ray right lung airspace disease c/w pneumonia.  Respiratory culture from 01/05 with multiple organisms including Serratia marcescens, ESBL Klebsiella pneumoniae, and  Pseudomonas.    Empirically started on Vancomycin and Zosyn, switched to Meropenem 1/9.     Further workup including CT scan abdomen/pelvis revealed findings c/w acute cholecystitis with 2 stones in the cystic duct.  HIDA scan suggestive of cystic duct obstruction.     Plan for IR guided percutaneous drainage on 1/13.     ID consult for acute cholecystitis and antibiotic recommendations.     INTERVAL HISTORY:   1/13:  Interim reviewed, patient status post IR guided per ирина.  Large amount of purulent bilious fluid drained, drain in place, fluid sent for cultures, in process.  Tachypneic, hypertensive, afebrile. Tracheal secretions remain thick and purulent. Repeat blood cultures 1/12 x 2 sets no growth. .    1/14: Patient seen and examined at bedside. No acute events overnight, she is trach to vent FiO2 50%, cloudy white secretions.WBC: 5, no left shift, stable. Lab called, sensitivities for Avycaz will be available later today or tomorrow morning. IR drain in place, draining purulent bilious fluid. Repeat blood cultures no  "growth.     01/15/2022 Dr Mccormack  No new eventomfortable.  Intubated.  Nonverbal., on vent 24/350/30%  procal 0.57---0.15  CRP  123.8---62.9   GB fluid GNR  01/16/2020 to Dr. Mccormack.  No new events.  Still intubated.  Cultures from gallbladder fluid are showing Enterococcus and Klebsiella ESBL.    1/17(Chris) consult reviewed and d/w Cassidy Schneider and Ksenia.  Afebrile, ventilator needs unchanged, no pressors, urine output not recorded for the last 24+hours.  Procalcitonin normal, CRP down to 41, white blood cells been normal for several days, chest x-ray is improving. Drainage from cholecystostomy not particularly purulent.   1/18: interim reviewed. tmax 99. Vent needs unchanged.  Cholecystostomy drain putting out very little, 50 cc over the last 24 hours.  KUB today negative, done for abd distention. She had some hypotension and irritability today.  1/19:  Interim reviewed  . No new events or difficulties today. 30 cc from GB today.   1/20: interim reviewed. Afebrile. CXR is noticeably improved. Issues regarding neuro status reviewed and observed at nearly every visit/exam. CT head reviewed.     Antibiotics (From admission, onward)            Start     Stop Route Frequency Ordered    01/18/22 1430  vancomycin 1.25 g in dextrose 5% 250 mL IVPB (ready to mix)         -- IV Every 18 hours 01/18/22 1400    01/16/22 1253  vancomycin - pharmacy to dose  (vancomycin IVPB)        "And" Linked Group Details    -- IV pharmacy to manage frequency 01/16/22 1153    01/12/22 1930  cefTAZidime-avibactam (AVYCAZ) 2.5 g in dextrose 5 % 100 mL         -- IV Every 8 hours (non-standard times) 01/12/22 1757        Antifungals (From admission, onward)            Start     Stop Route Frequency Ordered    01/17/22 2100  miconazole 2 % cream         -- Top 2 times daily 01/17/22 1734             EXAM & DIAGNOSTICS REVIEWED:   Vitals:     Temp:  [87.6 °F (30.9 °C)-97.34 °F (36.3 °C)]   Temp: 96.98 °F (36.1 °C) (01/20/22 5560)  Pulse: (!) 118 " (01/20/22 1715)  Resp: (!) 25 (01/20/22 1715)  BP: (!) 108/59 (01/20/22 1700)  SpO2: 96 % (01/20/22 1715)    Intake/Output Summary (Last 24 hours) at 1/20/2022 1807  Last data filed at 1/20/2022 1756  Gross per 24 hour   Intake 2513.3 ml   Output 1455 ml   Net 1058.3 ml     Vent Mode: SIMV (VC) + PS  Oxygen Concentration (%):  [30] 30  Resp Rate Total:  [24 br/min-75 br/min] 25 br/min  Vt Set:  [350 mL] 350 mL  PEEP/CPAP:  [5 cmH20-6.5 cmH20] 6.5 cmH20  Pressure Support:  [10 cmH20] 10 cmH20  Mean Airway Pressure:  [7.8 stI43-50.7 cmH20] 7.8 cmH20    General:  Chronically ill-appearing, trach to vent FiO2 30%, in NAD.      Eyes:  Hyperemic conjunctiva both eyes, , anicteric,    ENT:  Limited, moist mucus membranes, no thrush  Neck:  Supple  Lungs: Clearer but decreased BS in bases.    Heart:  S1/S2+, regular rhythm, no murmurs  Abd:  RUQ IR drain in place,  minimal bilious fluid    PEG tube in place, no evidence of redness or purulent drainage from ostomy, +BS, soft, non tender, moderately distended  :  [place],    Musc:  Flexure contractures on hands  Skin:  Incredible tinea pedis with sloughing of skin  Wound:   Neuro: Not following commands  Psych:  Easily agitated  Lymphatic:      Extrem: No LE edema b/l  VAD:         Isolation:     1/4/22:       Sacrum  Right hand    Right elbow      General Labs reviewed:  Recent Labs   Lab 01/15/22  1341 01/17/22  0335 01/19/22  0726   WBC 5.62 4.59 3.40*   HGB 9.1* 9.2* 8.2*   HCT 29.4* 31.1* 27.8*   * 474* 400       Recent Labs   Lab 01/18/22  0323 01/19/22  0319 01/20/22  0532    134* 136   K 3.8 4.5 3.7   CL 98 98 99   CO2 27 24 27   BUN 10 14 13   CREATININE 0.4* 0.4* 0.4*   CALCIUM 9.1 9.2 8.9   PROT 7.0 7.2 6.9   BILITOT 0.4 0.5 0.4   ALKPHOS 148* 145* 134   ALT 13 15 13   AST 16 19 16     Recent Labs   Lab 01/14/22  0339 01/17/22  0335   CRP 62.9* 41.2*     No results for input(s): SEDRATE in the last 168 hours.    Estimated Creatinine Clearance: 133.9  mL/min (A) (based on SCr of 0.4 mg/dL (L)).     Micro:  Microbiology Results (last 7 days)     Procedure Component Value Units Date/Time    Culture, Anaerobe [[id number]] Collected: 01/13/22 1120    Order Status: Completed Specimen: Body Fluid from Gallbladder Updated: 01/18/22 1441     Anaerobic Culture No anaerobes isolated    Blood culture [[id number]] Collected: 01/12/22 1627    Order Status: Completed Specimen: Blood from Peripheral, Left Hand Updated: 01/17/22 2312     Blood Culture, Routine No growth after 5 days.    Blood culture [[id number]] Collected: 01/12/22 1627    Order Status: Completed Specimen: Blood from Peripheral, Right Hand Updated: 01/17/22 2312     Blood Culture, Routine No growth after 5 days.    Aerobic culture [[id number]]  (Abnormal)  (Susceptibility) Collected: 01/13/22 1120    Order Status: Completed Specimen: Body Fluid from Gallbladder Updated: 01/17/22 1010     Aerobic Bacterial Culture KLEBSIELLA PNEUMONIAE ESBL  Moderate        ENTEROCOCCUS FAECALIS  Moderate      Culture, Respiratory with Gram Stain [[id number]]  (Abnormal)  (Susceptibility) Collected: 01/05/22 2009    Order Status: Completed Specimen: Respiratory from Endotracheal Aspirate Updated: 01/15/22 1030     Respiratory Culture No S aureus isolated.      SERRATIA MARCESCENS  Many        KLEBSIELLA PNEUMONIAE ESBL  Moderate        PSEUDOMONAS AERUGINOSA   Moderate       Gram Stain (Respiratory) <10 epithelial cells per low power field.     Gram Stain (Respiratory) Rare WBC's     Gram Stain (Respiratory) Few Gram negative rods     Gram Stain (Respiratory) Rare yeast          Imaging Reviewed:  CXRs  CT abdomen/pelvis   HIDA scan  KUB    IMPRESSION & PLAN     1.  Sepsis resolved, multifactorial; right pneumonia/UTI/acute cholecystitis s/p IR guided perc ирина 1/13   Bilious fluid cultures showing Klebsiella ESBL and Enterococcus faecalis.   Urine culture grew Proteus mirabilis likely ESBL   Blood cultures from admission 1/4  bottles grew CoNS, likely a contaminant   Respiratory culture 1/5 with multiple organisms; Serratia marcescens, ESBL Klebsiella pneumoniae, and  Pseudomonas, the latter susceptible to Avycaz, started 1/12   Repeat blood cultures x 2 sets no growth pending final    Procalcitonin 0.57     2.   Low white blood cell count today, which may be a medication side effect from the antibiotics.    3. PMHx cardiac arrest, anoxic brain injury, bed-bound, cirrhosis, GERD, nursing home resident, PE, tracheostomy and PEG since July 2020.      Recommendations:  Can stop avycaz now that her CXR is improved and she has received over 7 days of treatment  Can sub meropenem for ESBL Klebsiella in the biliary fluid and vancomycin for the enterococcus for another week, which can be given here, LTAC or NH from ID standpoint.  . We can keep the merrem dose on the low side for concern of seizure issues, though I have witnessed her having same findings with eyes, tachypnea etc whenever I have examined her (touched, moved, palpated or otherwise disturbed her)  ?neuro opinion     If the white blood cells fall any further would check a COVID test.       Will follow peripherally     Topical treatment for tinea pedis  Eventually Back to NH with cholecystostomy, repeat cholangiogram as outpatient  6 weeks. If cystic duct patent, remove tube, and if not, leave tube in indefinitely(per surgery)    D/w nursing     Medical Decision Making during this encounter was  [_] Low Complexity  [_] Moderate Complexity  [xx] High Complexity

## 2022-01-21 NOTE — DISCHARGE SUMMARY
[facility] Hospital Medicine  Discharge Summary      Patient Name: Genna Felix  MRN: [id number]  Patient Class: IP- Inpatient  Admission Date: 1/3/2022  Hospital Length of Stay: 18 days  Discharge Date and Time:  01/21/2022 11:49 AM  Attending Physician: Gabriella Allen MD   Discharging Provider: Gabriella Allen MD  Primary Care Provider: Primary Doctor No      HPI:   Genna Felix is a 61 year old female with a past medical history of anoxic brain injury s/p G tube and tracheostomy, PE, HTN, GERD who presented from [place] for further evaluation of tachycardia.  She was discharged from this facility on 12/27/21 with a diagnosis of pneumonia with rx for bactrim.  In the ED she is found to be tachycardic with WBC 14K, lactic acid 2.6 and evidence of UTI.  CXR reveals worsening multifocal pneumonia.  Pt is nonverbal and cannot contribute to history of present illness, therefore further information is limited.  While in the ED, IV zosyn was initiated and she was give an IVF bolus.  She will be admitted to the service of hospital medicine for continued monitoring and treatment.      * No surgery found *      Hospital Course:   61-year-old female with past medical history of anoxic brain injury, PE, hypertension, GERD admitted to the hospital medicine service for UTI and worsening multifocal pneumonia.  She was placed on broad-spectrum antibiotics.  Pulmonologist was consulted.  Urine culture grew presumptive Proteus.  Sputum culture grew Pseudomonas species, Serratia species, Klebsiella pneumoniae (ESBL).  During hospital stay patient noted to have increase gastric residuals.  No definite abdominal obstruction identified.  Patient underwent a CT scan of the abdomen and pelvis which suggested acute cholecystitis for which General surgery team was consulted.  Patient underwent HIDA scan which confirm cystic duct obstruction.  General surgeon recommended percutaneous cholecystostomy drain  placement which was done by Interventional Radiology on January 13, 2022.  Services of infectious diseases specialist are being obtained who upgraded antibiotic therapy to intravenous Avycase.. IR drain in place, draining purulent bilious fluid. Repeat blood cultures no growth.    Procalcitonin was repeated normal, CRP down to 41, white blood cells been normal for several days, chest x-ray was improving. Drainage from cholecystostomy not purulent. Cholecystostomy drain putting out very little, 50 cc over the last 24 hours.  KUB today negative, done for abd distention. She had some hypotension and irritability today. Sepsis resolved, multifactorial; right pneumonia/UTI/acute cholecystitis s/p IR guided perc ирина 1/13    Bilious fluid cultures showed Klebsiella ESBL and Enterococcus faecalis.     Urine culture grew Proteus mirabilis likely ESBL.          Blood cultures from admission 1/4 bottles grew CoNS, likely a contaminant.          Respiratory culture 1/5 with multiple organisms; Serratia marcescens, ESBL Klebsiella pneumoniae, and  Pseudomonas, the latter susceptible to Avycaz, started 1/12.  Repeat blood cultures x 2 sets no growth pending final.  Patient was transferred to LTAC for continued of therapy.  As per ID recommendation, stopped  avycaz  CXR is improved .  Will be discharged on meropenem for ESBL Klebsiella in the biliary fluid and vancomycin for the enterococcus for another week, recommended to keep merrem dose on the low side for concern of seizure issues.  EEG was done was negative for any seizure activity.  Involuntary movement was thought to be related to my clinic checks from anoxic brain injury. repeat cholangiogram as outpatient  6 weeks. If cystic duct patent, remove tube, and if not, leave tube in indefinitely(per surgery       Goals of Care Treatment Preferences:  Code Status: Full Code       LaPOST: Yes           Consults:   Consults (From admission, onward)        Status Ordering  "Provider     Inpatient consult to Midline team  Once        Provider:  (Not yet assigned)    Acknowledged MARISOL FELICIANO     Pharmacy to dose Vancomycin consult  Once        Provider:  (Not yet assigned)   "And" Linked Group Details    Acknowledged ALEKSANDAR COLLINS     Inpatient consult to Social Work/Case Management  Once        Provider:  (Not yet assigned)    Completed SULTAN, AQIB     Inpatient consult to Infectious Diseases  Once        Provider:  Melissa Ruiz MD    Completed SULTAN, AQIB     Inpatient consult to General Surgery  Once        Provider:  Graham Huff MD    Completed SULTAN, AQIB     IP consult to case management  Once        Provider:  (Not yet assigned)    Completed MARY PLUNKETT     Inpatient consult to Registered Dietitian/Nutritionist  Once        Provider:  (Not yet assigned)    Completed ANN GARDNER     Inpatient consult to Registered Dietitian/Nutritionist  Once        Provider:  (Not yet assigned)    Completed GUANACO JUAREZ     Pulmonology  Once        Provider:  Jackson London MD    Completed GUANACO JUAREZ          No new Assessment & Plan notes have been filed under this hospital service since the last note was generated.  Service: Hospital Medicine    Final Active Diagnoses:    Diagnosis Date Noted POA    PRINCIPAL PROBLEM:  Sepsis [A41.9] 04/22/2021 Yes    Acute cholecystitis [K81.0] 01/11/2022 No    Hyponatremia [E87.1] 01/04/2022 Yes    UTI (urinary tract infection) [N39.0] 12/27/2021 Yes    Ventilator associated pneumonia [J95.851] 12/27/2021 Yes    Essential hypertension [I10] 10/24/2021 Yes    Gastroesophageal reflux disease [K21.9] 10/24/2021 Yes    Malnutrition of moderate degree [E44.0] 05/11/2021 Yes    Acute on chronic respiratory failure with hypoxia [J96.21] 05/10/2021 Yes    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] 05/01/2021 Not Applicable    PEG (percutaneous endoscopic gastrostomy) status [Z93.1] 05/01/2021 Not Applicable    " Anemia of chronic disease [D63.8] 04/21/2021 Yes    Anoxic brain injury [G93.1] 04/21/2021 Yes    Pressure injury of left buttock, 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  Microbiology:   Blood Culture   Lab Results   Component Value Date    LABBLOO No growth after 5 days. 01/12/2022    LABBLOO No growth after 5 days. 01/12/2022    and Urine Culture    Lab Results   Component Value Date    LABURIN PROTEUS MIRABILIS  >100,000 cfu/ml   (A) 01/03/2022       Pending Diagnostic Studies:     None         Medications:  Reconciled Home Medications:      Medication List      START taking these medications    artificial tears 0.5 % ophthalmic solution  Commonly known as: ISOPTO TEARS  Place 1 drop into both eyes as needed.     meropenem-0.9% sodium chloride 500 mg/50 mL Pgbk  Inject 50 mLs (500 mg total) into the vein every 8 (eight) hours.     VANCOMYCIN 1.25 G/250 ML D5W (READY TO MIX)  Inject 250 mLs (1,250 mg total) into the vein once daily. for 7 days        CONTINUE taking these medications    acetaminophen 325 MG tablet  Commonly known as: TYLENOL  2 tablets (650 mg total) by Per G Tube route every 4 (four) hours as needed for Pain or Temperature greater than (100.4F).     acetylcysteine 100 mg/ml (10%) 100 mg/mL (10 %) nebulizer solution  Commonly known as: MUCOMYST  Take 4 mLs by nebulization every 8 (eight) hours.     albuterol-ipratropium 2.5 mg-0.5 mg/3 mL nebulizer solution  Commonly known as: DUO-NEB  Take 3 mLs by nebulization every 6  (six) hours. Rescue     clonazePAM 0.5 MG tablet  Commonly known as: KlonoPIN  1 tablet (0.5 mg total) by Per G Tube route 3 (three) times daily.     ergocalciferol 50,000 unit Cap  Commonly known as: ERGOCALCIFEROL  Take 50,000 Units by mouth every 7 days.     ferrous sulfate 300 mg (60 mg iron)/5 mL syrup  Take 300 mg by mouth As instructed. Every other day     midodrine 10 MG tablet  Commonly known as: PROAMATINE  1 tablet (10 mg total) by Per G Tube route 3 (three) times daily.     polyethylene glycol 17 gram Pwpk  Commonly known as: GLYCOLAX  Take by mouth daily as needed.        STOP taking these medications    sulfamethoxazole-trimethoprim 800-160mg 800-160 mg Tab  Commonly known as: BACTRIM DS            Indwelling Lines/Drains at time of discharge:   Lines/Drains/Airways     Drain                 Gastrostomy/Enterostomy 07/27/20 543 days         Urethral Catheter 01/04/22 0552 17 days         Open Drain 01/13/22 1015 Right RLQ Other (see comments) 1/4 inch 8 days          Airway                 Surgical Airway Portex Cuffed -- days         Surgical Airway 07/27/20 Shiley Cuffed 543 days                Time spent on the discharge of patient: 60 minutes    Critical care time spent on the evaluation and treatment of severe organ dysfunction, review of pertinent labs and imaging studies, discussions with consulting providers and discussions with patient/family: 60 minutes.     Gabriella Allen MD  Department of Hospital Medicine  [facility]

## 2022-01-21 NOTE — PROGRESS NOTES
Pharmacokinetic Assessment Follow Up: IV Vancomycin    Vancomycin serum concentration assessment(s):    The random level was drawn correctly and can be used to guide therapy at this time. The measurement is within the desired definitive target range of 15 to 20 mcg/mL.    Vancomycin Regimen Plan:    Change regimen to Vancomycin 1250 mg IV every 24 hours with next serum trough concentration measured at 0530 prior to 3rd dose on 01/23/22    Drug levels (last 3 results):  Recent Labs   Lab Result Units 01/18/22  1243 01/19/22  0726 01/20/22  1947 01/21/22  0453   Vancomycin, Random ug/mL 13.2  --   --  17.3   Vancomycin-Trough ug/mL  --  14.3 23.5*  --        Pharmacy will continue to follow and monitor vancomycin.    Please contact pharmacy at extension [phone] for questions regarding this assessment.    Thank you for the consult,   Andrei Spencer       Patient brief summary:  Genna Felix is a 61 y.o. female initiated on antimicrobial therapy with IV Vancomycin for treatment of intra-abdominal infection    Drug Allergies:   Review of patient's allergies indicates:  No Known Allergies    Actual Body Weight:   71.7kg    Renal Function:   Estimated Creatinine Clearance: 107.1 mL/min (based on SCr of 0.5 mg/dL).,     CBC (last 72 hours):  Recent Labs   Lab Result Units 01/19/22  0726   WBC K/uL 3.40*   Hemoglobin g/dL 8.2*   Hematocrit % 27.8*   Platelets K/uL 400   Gran % % 57.9   Lymph % % 25.6   Mono % % 8.8   Eosinophil % % 6.8   Basophil % % 0.6   Differential Method  Automated       Metabolic Panel (last 72 hours):  Recent Labs   Lab Result Units 01/19/22  0319 01/20/22  0532 01/21/22  0453   Sodium mmol/L 134* 136 138   Potassium mmol/L 4.5 3.7 4.7   Chloride mmol/L 98 99 102   CO2 mmol/L 24 27 27   Glucose mg/dL 94 105 83   BUN mg/dL 14 13 13   Creatinine mg/dL 0.4* 0.4* 0.5   Albumin g/dL 2.4* 2.3* 2.3*   Total Bilirubin mg/dL 0.5 0.4 0.5   Alkaline Phosphatase U/L 145* 134 131   AST U/L 19 16 17   ALT U/L 15 13 14    Magnesium mg/dL 1.7 1.6 1.8   Phosphorus mg/dL 3.9 4.0 3.8       Vancomycin Administrations:  vancomycin given in the last 96 hours                     vancomycin 1.25 g in dextrose 5% 250 mL IVPB (ready to mix) ()  Restarted 01/20/22 0349     1,250 mg New Bag  0244     1,250 mg New Bag 01/19/22 0831     1,250 mg New Bag 01/18/22 1418    vancomycin in dextrose 5 % 1 gram/250 mL IVPB 1,000 mg ()  Restarted 01/17/22 2114      Restarted  2023     1,000 mg New Bag  2021                    Microbiologic Results:  Microbiology Results (last 7 days)       Procedure Component Value Units Date/Time    Culture, Anaerobe [[id number]] Collected: 01/13/22 1120    Order Status: Completed Specimen: Body Fluid from Gallbladder Updated: 01/18/22 1441     Anaerobic Culture No anaerobes isolated    Blood culture [[id number]] Collected: 01/12/22 1627    Order Status: Completed Specimen: Blood from Peripheral, Left Hand Updated: 01/17/22 2312     Blood Culture, Routine No growth after 5 days.    Blood culture [[id number]] Collected: 01/12/22 1627    Order Status: Completed Specimen: Blood from Peripheral, Right Hand Updated: 01/17/22 2312     Blood Culture, Routine No growth after 5 days.    Aerobic culture [[id number]]  (Abnormal)  (Susceptibility) Collected: 01/13/22 1120    Order Status: Completed Specimen: Body Fluid from Gallbladder Updated: 01/17/22 1010     Aerobic Bacterial Culture KLEBSIELLA PNEUMONIAE ESBL  Moderate        ENTEROCOCCUS FAECALIS  Moderate      Culture, Respiratory with Gram Stain [[id number]]  (Abnormal)  (Susceptibility) Collected: 01/05/22 2009    Order Status: Completed Specimen: Respiratory from Endotracheal Aspirate Updated: 01/15/22 1030     Respiratory Culture No S aureus isolated.      SERRATIA MARCESCENS  Many        KLEBSIELLA PNEUMONIAE ESBL  Moderate        PSEUDOMONAS AERUGINOSA   Moderate       Gram Stain (Respiratory) <10 epithelial cells per low power field.     Gram Stain (Respiratory) Rare  WBC's     Gram Stain (Respiratory) Few Gram negative rods     Gram Stain (Respiratory) Rare yeast

## 2022-01-21 NOTE — DISCHARGE INSTRUCTIONS
[facility] Transfer Orders        Ad[facility] to: [facility]    Diagnoses:   Active Hospital Problems    Diagnosis  POA    *Sepsis [A41.9]  Yes    Acute cholecystitis [K81.0]  No    Hyponatremia [E87.1]  Yes    UTI (urinary tract infection) [N39.0]  Yes    Ventilator associated pneumonia [J95.851]  Yes    Essential hypertension [I10]  Yes    Gastroesophageal reflux disease [K21.9]  Yes    Malnutrition of moderate degree [E44.0]  Yes    Acute on chronic respiratory failure with hypoxia [J96.21]  Yes    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  Added automatically from request for surgery [id number]        Resolved Hospital Problems   No resolved problems to display.     Allergies: Review of patient's allergies indicates:  No Known Allergies    Code Status: Full code    Vitals: Routine       Diet: tube feedings: Isosource 1.5 @ 50 ml/hr + 110 ml flush q 4 hr + beneprotein BID +Benny BID     Activity: Activity as tolerated    Nursing Precautions: Aspiration , Fall, Seizure and Pressure ulcer prevention    Bed/Surface: Low Air Loss    Consults: Wound Care and Nutrition to evaluate and recommend diet    Oxygen:  On chronic Vent  Vent Mode: SIMV-VC-PS  Oxygen Concentration (%):  [30] 30  Resp Rate Total:  [24 br/min-51 br/min] 24 br/min  Vt Set:  [350 mL] 350 mL  PEEP/CPAP:  [5 cmH20-6.5 cmH20] 5.3 cmH20  Pressure Support:  [10 cmH20] 10 cmH20  Mean Airway Pressure:  [12.4 wwH66-67 cmH20] 12.7 cmH20    Dialysis: Patient is not on dialysis.     Labs: First blood draw on 1/21. These results to PCP.              CBL and BMP in 1 week   Pending Diagnostic Studies:     None            Miscellaneous Care:    PEG Care:  Clean site every 24 hours  Hamilton Care: Empty Hamilton bag every shift.  Change Hamilton every month  Routine Skin for Bedridden Patients:  Apply moisture barrier cream to all  Wound Care: yes:  Wound care with Urgotul and foam dressing done    IV Access: Mid-line     Medications: Discontinue all previous medication orders, if any. See new list below.  Current Discharge Medication List      START taking these medications    Details   artificial tears (ISOPTO TEARS) 0.5 % ophthalmic solution Place 1 drop into both eyes as needed.      meropenem-0.9% sodium chloride 500 mg/50 mL PgBk Inject 50 mLs (500 mg total) into the vein every 8 (eight) hours.      vancomycin HCl (VANCOMYCIN 1.25 G/250 ML D5W, READY TO MIX,) Inject 250 mLs (1,250 mg total) into the vein once daily. for 7 days         CONTINUE these medications which have NOT CHANGED    Details   acetaminophen (TYLENOL) 325 MG tablet 2 tablets (650 mg total) by Per G Tube route every 4 (four) hours as needed for Pain or Temperature greater than (100.4F).  Refills: 0      acetylcysteine 100 mg/ml, 10%, (MUCOMYST) 100 mg/mL (10 %) nebulizer solution Take 4 mLs by nebulization every 8 (eight) hours.  Refills: 12      albuterol-ipratropium (DUO-NEB) 2.5 mg-0.5 mg/3 mL nebulizer solution Take 3 mLs by nebulization every 6 (six) hours. Rescue  Qty: 1 Box, Refills: 0      clonazePAM (KLONOPIN) 0.5 MG tablet 1 tablet (0.5 mg total) by Per G Tube route 3 (three) times daily.  Qty: 6 tablet, Refills: 0      ergocalciferol (ERGOCALCIFEROL) 50,000 unit Cap Take 50,000 Units by mouth every 7 days.      ferrous sulfate 300 mg (60 mg iron)/5 mL syrup Take 300 mg by mouth As instructed. Every other day      midodrine (PROAMATINE) 10 MG tablet 1 tablet (10 mg total) by Per G Tube route 3 (three) times daily.  Qty: 90 tablet, Refills: 11      polyethylene glycol (GLYCOLAX) 17 gram PwPk Take by mouth daily as needed.         STOP taking these medications        sulfamethoxazole-trimethoprim 800-160mg (BACTRIM DS) 800-160 mg Tab Comments:   Reason for Stopping:             Follow up:     Immunizations Administered as of 1/21/2022     No immunizations on file.          Gabriella Allen MD

## 2022-01-22 NOTE — PLAN OF CARE
POC reviewed. Trach/vent dependent. VSS, afebrile. Beth hugger in place for temp management. Had 2x episodes yellow/bile/digested TF emesis this am despite zofran. Highest residual 135. TF paused and MD notified; CLAU completed and results WDL. TF restarted, no more emesis noted. Cholecystostomy patent with bile output. Chronic carroll with adequate UOP. Dressings to elbow/sacrum CDI. Orders to transfer to LTACH placed, report called - awaiting EMS for transport. Comfort promoted, safety maintained.

## 2022-01-24 ENCOUNTER — HOSPITAL ENCOUNTER (INPATIENT)
Facility: HOSPITAL | Age: 62
LOS: 3 days | Discharge: NURSING, REHAB OR LONG-TERM CARE | DRG: 871 | End: 2022-01-27
Attending: EMERGENCY MEDICINE | Admitting: HOSPITALIST
Payer: MEDICARE

## 2022-01-24 DIAGNOSIS — J18.9 COMMUNITY ACQUIRED PNEUMONIA OF RIGHT LOWER LOBE OF LUNG: ICD-10-CM

## 2022-01-24 DIAGNOSIS — R65.20 SEVERE SEPSIS: Primary | ICD-10-CM

## 2022-01-24 DIAGNOSIS — A41.9 SEVERE SEPSIS: Primary | ICD-10-CM

## 2022-01-24 DIAGNOSIS — T68.XXXA HYPOTHERMIA: ICD-10-CM

## 2022-01-24 PROBLEM — K81.0 ACUTE CHOLECYSTITIS: Status: RESOLVED | Noted: 2022-01-11 | Resolved: 2022-01-24

## 2022-01-24 PROBLEM — D50.9 IRON DEFICIENCY ANEMIA: Status: RESOLVED | Noted: 2020-11-13 | Resolved: 2022-01-24

## 2022-01-24 PROBLEM — N39.0 UTI (URINARY TRACT INFECTION): Status: RESOLVED | Noted: 2021-12-27 | Resolved: 2022-01-24

## 2022-01-24 PROBLEM — G93.6 CEREBRAL EDEMA: Status: RESOLVED | Noted: 2021-10-24 | Resolved: 2022-01-24

## 2022-01-24 PROBLEM — R40.3 PERSISTENT VEGETATIVE STATE: Status: ACTIVE | Noted: 2021-04-21

## 2022-01-24 LAB
ALBUMIN SERPL BCP-MCNC: 2.4 G/DL (ref 3.5–5.2)
ALP SERPL-CCNC: 130 U/L (ref 55–135)
ALT SERPL W/O P-5'-P-CCNC: 25 U/L (ref 10–44)
ANION GAP SERPL CALC-SCNC: 12 MMOL/L (ref 8–16)
AST SERPL-CCNC: 24 U/L (ref 10–40)
BACTERIA #/AREA URNS HPF: ABNORMAL /HPF
BASOPHILS # BLD AUTO: 0.03 K/UL (ref 0–0.2)
BASOPHILS NFR BLD: 0.8 % (ref 0–1.9)
BILIRUB SERPL-MCNC: 0.4 MG/DL (ref 0.1–1)
BILIRUB UR QL STRIP: NEGATIVE
BUN SERPL-MCNC: 16 MG/DL (ref 6–20)
CALCIUM SERPL-MCNC: 9.3 MG/DL (ref 8.7–10.5)
CHLORIDE SERPL-SCNC: 108 MMOL/L (ref 95–110)
CLARITY UR: CLEAR
CO2 SERPL-SCNC: 24 MMOL/L (ref 23–29)
COLOR UR: YELLOW
CREAT SERPL-MCNC: 0.5 MG/DL (ref 0.5–1.4)
DIFFERENTIAL METHOD: ABNORMAL
EOSINOPHIL # BLD AUTO: 0.2 K/UL (ref 0–0.5)
EOSINOPHIL NFR BLD: 4.3 % (ref 0–8)
ERYTHROCYTE [DISTWIDTH] IN BLOOD BY AUTOMATED COUNT: 19.1 % (ref 11.5–14.5)
EST. GFR  (AFRICAN AMERICAN): >60 ML/MIN/1.73 M^2
EST. GFR  (NON AFRICAN AMERICAN): >60 ML/MIN/1.73 M^2
GLUCOSE SERPL-MCNC: 66 MG/DL (ref 70–110)
GLUCOSE UR QL STRIP: NEGATIVE
HCT VFR BLD AUTO: 28.6 % (ref 37–48.5)
HGB BLD-MCNC: 8.4 G/DL (ref 12–16)
HGB UR QL STRIP: ABNORMAL
HYALINE CASTS #/AREA URNS LPF: 1 /LPF
IMM GRANULOCYTES # BLD AUTO: 0.01 K/UL (ref 0–0.04)
IMM GRANULOCYTES NFR BLD AUTO: 0.3 % (ref 0–0.5)
INR PPP: 1.1 (ref 0.8–1.2)
KETONES UR QL STRIP: NEGATIVE
LACTATE SERPL-SCNC: 0.9 MMOL/L (ref 0.5–2.2)
LACTATE SERPL-SCNC: 1 MMOL/L (ref 0.5–2.2)
LEUKOCYTE ESTERASE UR QL STRIP: ABNORMAL
LYMPHOCYTES # BLD AUTO: 1.4 K/UL (ref 1–4.8)
LYMPHOCYTES NFR BLD: 35.5 % (ref 18–48)
MCH RBC QN AUTO: 27.3 PG (ref 27–31)
MCHC RBC AUTO-ENTMCNC: 29.4 G/DL (ref 32–36)
MCV RBC AUTO: 93 FL (ref 82–98)
MICROSCOPIC COMMENT: ABNORMAL
MONOCYTES # BLD AUTO: 0.5 K/UL (ref 0.3–1)
MONOCYTES NFR BLD: 12.5 % (ref 4–15)
NEUTROPHILS # BLD AUTO: 1.9 K/UL (ref 1.8–7.7)
NEUTROPHILS NFR BLD: 46.6 % (ref 38–73)
NITRITE UR QL STRIP: NEGATIVE
NRBC BLD-RTO: 1 /100 WBC
PH UR STRIP: 7 [PH] (ref 5–8)
PLATELET # BLD AUTO: 409 K/UL (ref 150–450)
PLATELET BLD QL SMEAR: ABNORMAL
PMV BLD AUTO: 10.4 FL (ref 9.2–12.9)
POCT GLUCOSE: 83 MG/DL (ref 70–110)
POTASSIUM SERPL-SCNC: 4.2 MMOL/L (ref 3.5–5.1)
PROT SERPL-MCNC: 7.3 G/DL (ref 6–8.4)
PROT UR QL STRIP: ABNORMAL
PROTHROMBIN TIME: 11.4 SEC (ref 9–12.5)
RBC # BLD AUTO: 3.08 M/UL (ref 4–5.4)
RBC #/AREA URNS HPF: 6 /HPF (ref 0–4)
SARS-COV-2 RDRP RESP QL NAA+PROBE: NEGATIVE
SODIUM SERPL-SCNC: 144 MMOL/L (ref 136–145)
SP GR UR STRIP: 1.01 (ref 1–1.03)
SQUAMOUS #/AREA URNS HPF: 2 /HPF
TSH SERPL DL<=0.005 MIU/L-ACNC: 1.82 UIU/ML (ref 0.4–4)
URN SPEC COLLECT METH UR: ABNORMAL
UROBILINOGEN UR STRIP-ACNC: NEGATIVE EU/DL
WBC # BLD AUTO: 4 K/UL (ref 3.9–12.7)
WBC #/AREA URNS HPF: 38 /HPF (ref 0–5)
WBC CLUMPS URNS QL MICRO: ABNORMAL

## 2022-01-24 PROCEDURE — A4216 STERILE WATER/SALINE, 10 ML: HCPCS | Performed by: HOSPITALIST

## 2022-01-24 PROCEDURE — 85610 PROTHROMBIN TIME: CPT | Performed by: EMERGENCY MEDICINE

## 2022-01-24 PROCEDURE — 25000003 PHARM REV CODE 250: Performed by: HOSPITALIST

## 2022-01-24 PROCEDURE — 85025 COMPLETE CBC W/AUTO DIFF WBC: CPT | Performed by: EMERGENCY MEDICINE

## 2022-01-24 PROCEDURE — 27000221 HC OXYGEN, UP TO 24 HOURS

## 2022-01-24 PROCEDURE — 36415 COLL VENOUS BLD VENIPUNCTURE: CPT | Performed by: EMERGENCY MEDICINE

## 2022-01-24 PROCEDURE — 96361 HYDRATE IV INFUSION ADD-ON: CPT

## 2022-01-24 PROCEDURE — 80053 COMPREHEN METABOLIC PANEL: CPT | Performed by: EMERGENCY MEDICINE

## 2022-01-24 PROCEDURE — 84443 ASSAY THYROID STIM HORMONE: CPT | Performed by: EMERGENCY MEDICINE

## 2022-01-24 PROCEDURE — 87040 BLOOD CULTURE FOR BACTERIA: CPT | Performed by: EMERGENCY MEDICINE

## 2022-01-24 PROCEDURE — 25000242 PHARM REV CODE 250 ALT 637 W/ HCPCS: Performed by: HOSPITALIST

## 2022-01-24 PROCEDURE — 96374 THER/PROPH/DIAG INJ IV PUSH: CPT

## 2022-01-24 PROCEDURE — 63600175 PHARM REV CODE 636 W HCPCS: Performed by: HOSPITALIST

## 2022-01-24 PROCEDURE — 83605 ASSAY OF LACTIC ACID: CPT | Mod: 91 | Performed by: EMERGENCY MEDICINE

## 2022-01-24 PROCEDURE — 93005 ELECTROCARDIOGRAM TRACING: CPT

## 2022-01-24 PROCEDURE — 99900035 HC TECH TIME PER 15 MIN (STAT)

## 2022-01-24 PROCEDURE — 25500020 PHARM REV CODE 255

## 2022-01-24 PROCEDURE — C1751 CATH, INF, PER/CENT/MIDLINE: HCPCS

## 2022-01-24 PROCEDURE — 94640 AIRWAY INHALATION TREATMENT: CPT

## 2022-01-24 PROCEDURE — U0002 COVID-19 LAB TEST NON-CDC: HCPCS | Performed by: EMERGENCY MEDICINE

## 2022-01-24 PROCEDURE — 20000000 HC ICU ROOM

## 2022-01-24 PROCEDURE — 94761 N-INVAS EAR/PLS OXIMETRY MLT: CPT

## 2022-01-24 PROCEDURE — 87086 URINE CULTURE/COLONY COUNT: CPT | Performed by: EMERGENCY MEDICINE

## 2022-01-24 PROCEDURE — 94002 VENT MGMT INPAT INIT DAY: CPT

## 2022-01-24 PROCEDURE — 27200966 HC CLOSED SUCTION SYSTEM

## 2022-01-24 PROCEDURE — 93010 ELECTROCARDIOGRAM REPORT: CPT | Mod: ,,, | Performed by: SPECIALIST

## 2022-01-24 PROCEDURE — 99285 EMERGENCY DEPT VISIT HI MDM: CPT | Mod: 25

## 2022-01-24 PROCEDURE — 63600175 PHARM REV CODE 636 W HCPCS: Performed by: EMERGENCY MEDICINE

## 2022-01-24 PROCEDURE — 93010 EKG 12-LEAD: ICD-10-PCS | Mod: ,,, | Performed by: SPECIALIST

## 2022-01-24 PROCEDURE — 81000 URINALYSIS NONAUTO W/SCOPE: CPT | Performed by: EMERGENCY MEDICINE

## 2022-01-24 RX ORDER — SODIUM CHLORIDE 9 MG/ML
INJECTION, SOLUTION INTRAVENOUS CONTINUOUS
Status: DISCONTINUED | OUTPATIENT
Start: 2022-01-24 | End: 2022-01-27 | Stop reason: HOSPADM

## 2022-01-24 RX ORDER — MEROPENEM AND SODIUM CHLORIDE 1 G/50ML
500 INJECTION, SOLUTION INTRAVENOUS
Status: COMPLETED | OUTPATIENT
Start: 2022-01-24 | End: 2022-01-24

## 2022-01-24 RX ORDER — GLUCAGON 1 MG
1 KIT INJECTION
Status: DISCONTINUED | OUTPATIENT
Start: 2022-01-24 | End: 2022-01-27 | Stop reason: HOSPADM

## 2022-01-24 RX ORDER — ENOXAPARIN SODIUM 100 MG/ML
40 INJECTION SUBCUTANEOUS EVERY 24 HOURS
Status: DISCONTINUED | OUTPATIENT
Start: 2022-01-24 | End: 2022-01-27 | Stop reason: HOSPADM

## 2022-01-24 RX ORDER — PANTOPRAZOLE SODIUM 40 MG/1
40 TABLET, DELAYED RELEASE ORAL DAILY
Status: DISCONTINUED | OUTPATIENT
Start: 2022-01-25 | End: 2022-01-27 | Stop reason: HOSPADM

## 2022-01-24 RX ORDER — POLYETHYLENE GLYCOL 3350 17 G/17G
17 POWDER, FOR SOLUTION ORAL DAILY
Status: DISCONTINUED | OUTPATIENT
Start: 2022-01-25 | End: 2022-01-27 | Stop reason: HOSPADM

## 2022-01-24 RX ORDER — SODIUM CHLORIDE 0.9 % (FLUSH) 0.9 %
.1-1 SYRINGE (ML) INJECTION
Status: DISCONTINUED | OUTPATIENT
Start: 2022-01-24 | End: 2022-01-27 | Stop reason: HOSPADM

## 2022-01-24 RX ORDER — AMOXICILLIN 250 MG
1 CAPSULE ORAL 2 TIMES DAILY
Status: DISCONTINUED | OUTPATIENT
Start: 2022-01-24 | End: 2022-01-27 | Stop reason: HOSPADM

## 2022-01-24 RX ORDER — MIDODRINE HYDROCHLORIDE 5 MG/1
10 TABLET ORAL 3 TIMES DAILY
Status: DISCONTINUED | OUTPATIENT
Start: 2022-01-24 | End: 2022-01-27 | Stop reason: HOSPADM

## 2022-01-24 RX ORDER — ACETAMINOPHEN 325 MG/1
650 TABLET ORAL EVERY 4 HOURS PRN
Status: DISCONTINUED | OUTPATIENT
Start: 2022-01-24 | End: 2022-01-26

## 2022-01-24 RX ORDER — ACETAMINOPHEN 500 MG
1000 TABLET ORAL EVERY 4 HOURS PRN
Status: DISCONTINUED | OUTPATIENT
Start: 2022-01-24 | End: 2022-01-27 | Stop reason: HOSPADM

## 2022-01-24 RX ORDER — SODIUM,POTASSIUM PHOSPHATES 280-250MG
2 POWDER IN PACKET (EA) ORAL
Status: DISCONTINUED | OUTPATIENT
Start: 2022-01-24 | End: 2022-01-27 | Stop reason: HOSPADM

## 2022-01-24 RX ORDER — SODIUM CHLORIDE 0.9 % (FLUSH) 0.9 %
3 SYRINGE (ML) INJECTION EVERY 8 HOURS
Status: DISCONTINUED | OUTPATIENT
Start: 2022-01-24 | End: 2022-01-27 | Stop reason: HOSPADM

## 2022-01-24 RX ORDER — LANOLIN ALCOHOL/MO/W.PET/CERES
800 CREAM (GRAM) TOPICAL
Status: DISCONTINUED | OUTPATIENT
Start: 2022-01-24 | End: 2022-01-27 | Stop reason: HOSPADM

## 2022-01-24 RX ORDER — IPRATROPIUM BROMIDE AND ALBUTEROL SULFATE 2.5; .5 MG/3ML; MG/3ML
3 SOLUTION RESPIRATORY (INHALATION) EVERY 6 HOURS
Status: DISCONTINUED | OUTPATIENT
Start: 2022-01-24 | End: 2022-01-24

## 2022-01-24 RX ORDER — IBUPROFEN 200 MG
16 TABLET ORAL
Status: DISCONTINUED | OUTPATIENT
Start: 2022-01-24 | End: 2022-01-27 | Stop reason: HOSPADM

## 2022-01-24 RX ORDER — MUPIROCIN 20 MG/G
OINTMENT TOPICAL 2 TIMES DAILY
Status: DISCONTINUED | OUTPATIENT
Start: 2022-01-24 | End: 2022-01-27 | Stop reason: HOSPADM

## 2022-01-24 RX ORDER — IBUPROFEN 200 MG
24 TABLET ORAL
Status: DISCONTINUED | OUTPATIENT
Start: 2022-01-24 | End: 2022-01-27 | Stop reason: HOSPADM

## 2022-01-24 RX ORDER — SODIUM CHLORIDE 0.9 % (FLUSH) 0.9 %
3 SYRINGE (ML) INJECTION
Status: DISCONTINUED | OUTPATIENT
Start: 2022-01-24 | End: 2022-01-27 | Stop reason: HOSPADM

## 2022-01-24 RX ORDER — IPRATROPIUM BROMIDE AND ALBUTEROL SULFATE 2.5; .5 MG/3ML; MG/3ML
3 SOLUTION RESPIRATORY (INHALATION) EVERY 6 HOURS
Status: DISCONTINUED | OUTPATIENT
Start: 2022-01-25 | End: 2022-01-27 | Stop reason: HOSPADM

## 2022-01-24 RX ORDER — CLONAZEPAM 0.5 MG/1
0.5 TABLET ORAL 3 TIMES DAILY
Status: DISCONTINUED | OUTPATIENT
Start: 2022-01-24 | End: 2022-01-27 | Stop reason: HOSPADM

## 2022-01-24 RX ADMIN — MUPIROCIN: 20 OINTMENT TOPICAL at 09:01

## 2022-01-24 RX ADMIN — VANCOMYCIN HYDROCHLORIDE 1250 MG: 1.25 INJECTION, POWDER, LYOPHILIZED, FOR SOLUTION INTRAVENOUS at 07:01

## 2022-01-24 RX ADMIN — SODIUM CHLORIDE, SODIUM LACTATE, POTASSIUM CHLORIDE, AND CALCIUM CHLORIDE 500 ML: .6; .31; .03; .02 INJECTION, SOLUTION INTRAVENOUS at 01:01

## 2022-01-24 RX ADMIN — CLONAZEPAM 0.5 MG: 0.5 TABLET ORAL at 09:01

## 2022-01-24 RX ADMIN — ENOXAPARIN SODIUM 40 MG: 100 INJECTION SUBCUTANEOUS at 07:01

## 2022-01-24 RX ADMIN — Medication 3 ML: at 10:01

## 2022-01-24 RX ADMIN — SODIUM CHLORIDE 5 ML/HR: 0.9 INJECTION, SOLUTION INTRAVENOUS at 08:01

## 2022-01-24 RX ADMIN — MIDODRINE HYDROCHLORIDE 10 MG: 5 TABLET ORAL at 09:01

## 2022-01-24 RX ADMIN — IPRATROPIUM BROMIDE AND ALBUTEROL SULFATE 3 ML: 2.5; .5 SOLUTION RESPIRATORY (INHALATION) at 07:01

## 2022-01-24 RX ADMIN — MEROPENEM 2 G: 1 INJECTION, POWDER, FOR SOLUTION INTRAVENOUS at 07:01

## 2022-01-24 RX ADMIN — MEROPENEM AND SODIUM CHLORIDE 500 MG: 1 INJECTION, SOLUTION INTRAVENOUS at 03:01

## 2022-01-24 RX ADMIN — IOHEXOL 75 ML: 350 INJECTION, SOLUTION INTRAVENOUS at 01:01

## 2022-01-24 RX ADMIN — SENNOSIDES AND DOCUSATE SODIUM 1 TABLET: 8.6; 5 TABLET ORAL at 09:01

## 2022-01-24 NOTE — ED PROVIDER NOTES
Encounter Date: 1/24/2022       History     Chief Complaint   Patient presents with    Hyperventilating     Hypothermia  / from [facility]     Patient is a 61-year-old female with a past medical history of cardiac arrest anoxic brain injury bedbound cirrhosis GERD hypertension pulmonary embolism respiratory distress who recently was admitted to the hospital and found have cholecystitis and underwent a cholecystectomy who presents the emergency room from the long-[facility] acute care facility for hypothermia and tachypnea.  The patient is on a ventilator and his trach.  She recently underwent a cholecystectomy and has a CLARICE drain in place.  She also has a feeding tube catheter sacral decubitus ulcer and tracheostomy.  Patient is currently receiving vancomycin and meropenem at the [facility].  She has a history of ESBL  and MDRO.  History is limited because the patient is mute and debilitated        Review of patient's allergies indicates:  No Known Allergies  Past Medical History:   Diagnosis Date    Anoxic brain damage 07/2020    Bedbound 07/2020    Cardiac arrest 07/2020    Cirrhosis     GERD (gastroesophageal reflux disease)     Hemangioma of intra-abdominal structure     Hypertension     Nursing home resident     Protein calorie malnutrition     Pulmonary embolus     Respiratory distress     S/P percutaneous endoscopic gastrostomy (PEG) tube placement 07/2020    Total self-care deficit 07/2020    Tracheostomy dependence     7/2020     Past Surgical History:   Procedure Laterality Date    PEG W/TRACHEOSTOMY PLACEMENT  07/27/2020    SKIN GRAFT      buttocks     Family History   Problem Relation Age of Onset    No Known Problems Mother     No Known Problems Father      Social History     Tobacco Use    Smoking status: Never Smoker    Smokeless tobacco: Never Used   Substance Use Topics    Alcohol use: Not Currently    Drug use: Not Currently     Review of Systems   Unable to perform ROS: Patient  nonverbal   Constitutional:        Hypothermia   Respiratory: Positive for shortness of breath. Negative for cough.    Gastrointestinal: Positive for diarrhea.       Physical Exam     Initial Vitals [01/24/22 1216]   BP Pulse Resp Temp SpO2   101/64 75 (!) 60 (!) 94.6 °F (34.8 °C) 100 %      MAP       --         Physical Exam    Vitals reviewed.  Constitutional:   Chronically debilitated patient on a ventilator   HENT:   Head: Normocephalic and atraumatic.   Eyes:   Patient partially tracks with her eyes and pupils are equal   Neck: Neck supple.   Cardiovascular: Normal rate, regular rhythm and intact distal pulses.   Pulmonary/Chest: She has rales (Right lower).   Abdominal:   CLARICE drain in the right lateral abdomen with bilious output, left upper quadrant feeding tube, abdomen slightly distended.  Firm but unclear if tender   Musculoskeletal:      Cervical back: Neck supple.      Comments: Contracted bilateral upper lower extremity     Neurological: She is alert.   Skin:   Stage II sacral decubitus ulcer top part of the sacrum with stool in diaper         ED Course   Procedures  Labs Reviewed   CBC W/ AUTO DIFFERENTIAL - Abnormal; Notable for the following components:       Result Value    RBC 3.08 (*)     Hemoglobin 8.4 (*)     Hematocrit 28.6 (*)     MCHC 29.4 (*)     RDW 19.1 (*)     nRBC 1 (*)     All other components within normal limits   COMPREHENSIVE METABOLIC PANEL - Abnormal; Notable for the following components:    Glucose 66 (*)     Albumin 2.4 (*)     All other components within normal limits   URINALYSIS, REFLEX TO URINE CULTURE - Abnormal; Notable for the following components:    Protein, UA Trace (*)     Occult Blood UA Trace (*)     Leukocytes, UA 1+ (*)     All other components within normal limits    Narrative:     Specimen Source->Urine   URINALYSIS MICROSCOPIC - Abnormal; Notable for the following components:    RBC, UA 6 (*)     WBC, UA 38 (*)     Bacteria Few (*)     All other components  within normal limits    Narrative:     Specimen Source->Urine   CULTURE, URINE   LACTIC ACID, PLASMA   PROTIME-INR   TSH   LACTIC ACID, PLASMA   SARS-COV-2 RNA AMPLIFICATION, QUAL          Imaging Results          CT Abdomen Pelvis With Contrast (Final result)  Result time 01/24/22 14:43:07    Final result by Reji Burns MD (01/24/22 14:43:07)                 Impression:      1. Persistent airspace disease at the right base with small pleural effusion.  This could reflect pneumonia or aspirate.  2. Decompressed gallbladder with cholecystostomy.  3. Right renal lesion too high in density for a simple cyst.  Consider ultrasound to exclude solid lesion, when feasible.      Electronically signed by: Reji Burns  Date:    01/24/2022  Time:    14:43             Narrative:    EXAMINATION:  CT ABDOMEN PELVIS WITH CONTRAST    CLINICAL HISTORY:  Abdominal pain, post-op;Sepsis;hypothermic,;    TECHNIQUE:  Low dose axial images, sagittal and coronal reformations were obtained from the lung bases to the pubic symphysis following the IV administration of 75 mL of Omnipaque 350 .  Oral contrast was not given.    COMPARISON:  01/10/2022    FINDINGS:  There is airspace disease in the right lower lobe and dependent atelectasis left lower lobe.  Right pleural fluid.  Normal size heart.    There is a percutaneous cholecystostomy coiled along the gallbladder fossa. Gallbladder is contracted with several stones near the neck.  No biliary dilation.    1 cm hypodense lesion upper pole cortex left kidney Hounsfield unit 28.  Mild diffuse heterogeneous attenuation in the right hepatic lobe.  Remaining solid abdominal organs are unremarkable.    There is no enteric contrast which limits bowel assessment.  There is a PEG tube.  No dilated bowel loops.    No focal abdominal fluid collection.  Mild body wall edema.  Atherosclerosis.  Small fat containing umbilical defect.  Bladder partially collapsed around a Hamilton catheter.   Atrophied uterus.    Osteopenia.  Remote L2 compression fracture.  Multilevel facet degenerative changes in the lumbar spine.  Small right hip joint effusion.  Dense heterotopic ossification about both hips.                               X-Ray Chest AP Portable (Final result)  Result time 01/24/22 13:15:15    Final result by Shar Hubbard Jr., MD (01/24/22 13:15:15)                 Impression:      Increasing density of patchy right mid and lower lung field lung infiltrate.  Tracheostomy tube in place.      Electronically signed by: Shar Hubbard MD  Date:    01/24/2022  Time:    13:15             Narrative:    EXAMINATION:  XR CHEST AP PORTABLE    CLINICAL HISTORY:  Sepsis;    TECHNIQUE:  Single frontal view of the chest was performed.    COMPARISON:  Chest x-ray of January 20, 2022    FINDINGS:  A  tracheostomy tube is noted in place.  The cardiac size is within normal limits.  There is increased density of the patchy right mid and lower lung field infiltrates since the prior study.  The left lung is relatively clear.  No pneumothorax is seen.                                 Medications   acetaminophen tablet 650 mg (has no administration in time range)   clonazePAM tablet 0.5 mg (0.5 mg Per G Tube Given 1/24/22 2100)   midodrine tablet 10 mg (10 mg Per G Tube Given 1/24/22 2100)   polyethylene glycol packet 17 g (has no administration in time range)   sodium chloride 0.9% flush 3 mL (3 mLs Intravenous Given 1/24/22 2200)   magnesium oxide tablet 800 mg (has no administration in time range)   magnesium oxide tablet 800 mg (has no administration in time range)   potassium, sodium phosphates 280-160-250 mg packet 2 packet (has no administration in time range)   potassium, sodium phosphates 280-160-250 mg packet 2 packet (has no administration in time range)   potassium, sodium phosphates 280-160-250 mg packet 2 packet (has no administration in time range)   acetaminophen tablet 1,000 mg (has no  administration in time range)   senna-docusate 8.6-50 mg per tablet 1 tablet (1 tablet Oral Given 1/24/22 2100)   glucose chewable tablet 16 g (has no administration in time range)   glucose chewable tablet 24 g (has no administration in time range)   dextrose 50% injection 12.5 g (has no administration in time range)   dextrose 50% injection 25 g (has no administration in time range)   glucagon (human recombinant) injection 1 mg (has no administration in time range)   enoxaparin injection 40 mg (40 mg Subcutaneous Given 1/24/22 1915)   potassium bicarbonate disintegrating tablet 50 mEq (has no administration in time range)   potassium bicarbonate disintegrating tablet 35 mEq (has no administration in time range)   potassium bicarbonate disintegrating tablet 60 mEq (has no administration in time range)   meropenem (MERREM) 2 g in sodium chloride 0.9% 100 mL IVPB (0 g Intravenous Stopped 1/24/22 2000)   vancomycin - pharmacy to dose (has no administration in time range)   albuterol-ipratropium 2.5 mg-0.5 mg/3 mL nebulizer solution 3 mL (has no administration in time range)   sodium chloride 0.9% flush 3 mL (has no administration in time range)   sodium chloride 0.9% flush 0.1-10 mL (has no administration in time range)   0.9%  NaCl infusion (5 mL/hr Intravenous New Bag 1/24/22 2014)   pantoprazole EC tablet 40 mg (has no administration in time range)   mupirocin 2 % ointment ( Nasal Given 1/24/22 2158)   vancomycin 1.25 g in dextrose 5% 250 mL IVPB (ready to mix) (1,250 mg Intravenous Trough Due As Scheduled Before Dose 1/26/22 1830)   lactated ringers bolus 500 mL (0 mLs Intravenous Stopped 1/24/22 1443)   iohexoL (OMNIPAQUE 350) 350 mg iodine/mL injection (75 mLs Intravenous Given 1/24/22 1347)   meropenem-0.9% sodium chloride 1 g/50 mL IVPB (0 mg Intravenous Stopped 1/24/22 1648)                 ED Course as of 01/25/22 0014   Mon Jan 24, 2022   1332 EKG independently interpreted by me as rate 67 normal sinus  rhythm normal axis flattened T-waves prolonged QT no ST segment elevation or depression [JS]   1333 Patient is under a Beth Hugger.  She is hypothermic.  We need to rule out sepsis [JS]   1424 According to nursing home notes, patient is due for meropenem at 2:00 p.m. and I am concerned she may be developing sepsis given her hypothermia tachypnea and leukopenia.  I will give her 500 mg of meropenem now.  She received 1.25 g of vancomycin at 10:00 a.m. this morning.  Awaiting CT abdomen pelvis read [JS]   1506   Patient appears to have a worsening pneumonia with persistent hypothermia.  Sepsis is a concern.  Patient was given vancomycin at 10:00 a.m. and I dosed her with meropenem.  I spoke to the hospitalist who will admit. [JS]      ED Course User Index  [JS] Aries Salgado MD             Clinical Impression:   Final diagnoses:  [T68.XXXA] Hypothermia  [J18.9] Community acquired pneumonia of right lower lobe of lung (Primary)          ED Disposition Condition    Admit               Aries Salgado MD  01/25/22 0014

## 2022-01-24 NOTE — CARE UPDATE
01/24/22 1216   Patient Assessment/Suction   Level of Consciousness (AVPU) unresponsive   Respiratory Effort Moderate   Expansion/Accessory Muscles/Retractions expansion symmetric   All Lung Fields Breath Sounds Anterior:   LLL Breath Sounds diminished   RLL Breath Sounds diminished   Rhythm/Pattern, Respiratory assisted mechanically   Cough Frequency no cough   PRE-TX-O2   O2 Device (Oxygen Therapy) ventilator   $ Is the patient on Low Flow Oxygen? Yes   Oxygen Concentration (%) 30   SpO2 100 %   Pulse Oximetry Type Continuous   $ Pulse Oximetry - Multiple Charge Pulse Oximetry - Multiple   Pulse 75   Resp (!) 60   Temp (!) 94.6 °F (34.8 °C)   /64   Positioning HOB elevated 30 degrees   Skin Integrity   $ Wound Care Tech Time 15 min   Area Observed Neck under tracheostomy   Skin Appearance without discoloration   Equipment Change   $ RT Equipment ventilator;HEPA filter;exhalation filter;HME;Inline suction catheter   Vent Select   Conventional Vent Y   Intubation? Transfer In   Ventilator Initiated Yes   $ Ventilator Initial 1   Charged w/in last 24h YES   Preset Conventional Ventilator Settings   Vent Type NKV-550   Ventilation Type VCV   Vent Mode A/CMV-VC   Humidity HME   Set Rate 24 BPM   Vt Set 350 mL   PEEP/CPAP 5 cmH20   Insp Time 1 Sec(s)   I-Trigger Type  Flow   Patient Ventilator Parameters   Resp Rate Total 37 br/min   Peak Airway Pressure 31.6 cmH2O   Mean Airway Pressure 16.5 cmH20   Exhaled Vt 349 mL   Total Ve 14.1 mL   Conventional Ventilator Alarms   Alarms On Y   Ve High Alarm 15 L/min   Ve Low Alarm 1.5 L/min   Resp Rate High Alarm 55 br/min   Resp Rate Low Alarm 20   Press High Alarm 65 cmH2O   Press Low Alarm 42 cmH2O   Apnea Rate 30   Ready to Wean/Extubation Screen   FIO2<=50 (chart decimal) 0.3   MV<16L (chart vol.) 14.1   PEEP <=8 (chart #) 5   Ready to Wean Parameters   F/VT Ratio<105 (RSBI) 171.92   All alarms set and functioning.   Anesthesia ROS/Med Hx    Overall Review:  Pts. EKG was reviewed     Pulmonary Review:    The patient is a former smoker.     Neuro/Psych Review:    Negative for all neuro/psych ROS    Cardiovascular Review:    Pt. positive for CAD  Pt. positive for CABG/stent  Pt. positive for hypertension  Pt. positive for hyperlipidemia  Pt. positive for PVD    GI/HEPATIC/RENAL Review:    Pt. negative for GI/Hepatic/Renal ROS    End/Other Review:    Pt. positive for diabetes  Pt. positive for arthritis  Pt. positive for obesity class II - 35.00 - 39.99      Anesthesia Plan     ASA Status: 3  Anesthesia Type: MAC  Reviewed: EKG, NPO Status, Consultations, Pre-Induction Reassessment, Allergies, Medications, Problem List, Past Med History and Patient Summary  The proposed anesthetic plan, including its risks and benefits, have been discussed with the Patient - along with the risks and benefits of alternatives.  Questions were encouraged and answered and the patient and/or representative agrees to proceed.      Physical Exam  TM Distance: >3 FB  cardiovascular exam normal  pulmonary exam normal  Patient Demonstrates:  Obese  dental exam normal

## 2022-01-25 LAB
ALBUMIN SERPL BCP-MCNC: 2.4 G/DL (ref 3.5–5.2)
ALP SERPL-CCNC: 130 U/L (ref 55–135)
ALT SERPL W/O P-5'-P-CCNC: 21 U/L (ref 10–44)
ANION GAP SERPL CALC-SCNC: 11 MMOL/L (ref 8–16)
ANISOCYTOSIS BLD QL SMEAR: SLIGHT
AST SERPL-CCNC: 24 U/L (ref 10–40)
BASOPHILS # BLD AUTO: ABNORMAL K/UL (ref 0–0.2)
BASOPHILS NFR BLD: 0 % (ref 0–1.9)
BILIRUB SERPL-MCNC: 0.5 MG/DL (ref 0.1–1)
BUN SERPL-MCNC: 12 MG/DL (ref 6–20)
CALCIUM SERPL-MCNC: 9.3 MG/DL (ref 8.7–10.5)
CHLORIDE SERPL-SCNC: 110 MMOL/L (ref 95–110)
CO2 SERPL-SCNC: 21 MMOL/L (ref 23–29)
CREAT SERPL-MCNC: 0.5 MG/DL (ref 0.5–1.4)
DIFFERENTIAL METHOD: ABNORMAL
EOSINOPHIL # BLD AUTO: ABNORMAL K/UL (ref 0–0.5)
EOSINOPHIL NFR BLD: 4 % (ref 0–8)
ERYTHROCYTE [DISTWIDTH] IN BLOOD BY AUTOMATED COUNT: 19.5 % (ref 11.5–14.5)
EST. GFR  (AFRICAN AMERICAN): >60 ML/MIN/1.73 M^2
EST. GFR  (NON AFRICAN AMERICAN): >60 ML/MIN/1.73 M^2
GLUCOSE SERPL-MCNC: 61 MG/DL (ref 70–110)
HCT VFR BLD AUTO: 28.9 % (ref 37–48.5)
HGB BLD-MCNC: 8.3 G/DL (ref 12–16)
HYPOCHROMIA BLD QL SMEAR: ABNORMAL
IMM GRANULOCYTES # BLD AUTO: ABNORMAL K/UL (ref 0–0.04)
IMM GRANULOCYTES NFR BLD AUTO: ABNORMAL % (ref 0–0.5)
LACTATE SERPL-SCNC: 1 MMOL/L (ref 0.5–2.2)
LYMPHOCYTES # BLD AUTO: ABNORMAL K/UL (ref 1–4.8)
LYMPHOCYTES NFR BLD: 33 % (ref 18–48)
MAGNESIUM SERPL-MCNC: 1.7 MG/DL (ref 1.6–2.6)
MCH RBC QN AUTO: 27.6 PG (ref 27–31)
MCHC RBC AUTO-ENTMCNC: 28.7 G/DL (ref 32–36)
MCV RBC AUTO: 96 FL (ref 82–98)
MONOCYTES # BLD AUTO: ABNORMAL K/UL (ref 0.3–1)
MONOCYTES NFR BLD: 7 % (ref 4–15)
NEUTROPHILS NFR BLD: 52 % (ref 38–73)
NEUTS BAND NFR BLD MANUAL: 4 %
NRBC BLD-RTO: 1 /100 WBC
OVALOCYTES BLD QL SMEAR: ABNORMAL
PHOSPHATE SERPL-MCNC: 3.8 MG/DL (ref 2.7–4.5)
PLATELET # BLD AUTO: 394 K/UL (ref 150–450)
PLATELET BLD QL SMEAR: ABNORMAL
PMV BLD AUTO: 10.4 FL (ref 9.2–12.9)
POCT GLUCOSE: 181 MG/DL (ref 70–110)
POCT GLUCOSE: 93 MG/DL (ref 70–110)
POIKILOCYTOSIS BLD QL SMEAR: SLIGHT
POLYCHROMASIA BLD QL SMEAR: ABNORMAL
POTASSIUM SERPL-SCNC: 4.1 MMOL/L (ref 3.5–5.1)
PROT SERPL-MCNC: 7.2 G/DL (ref 6–8.4)
RBC # BLD AUTO: 3.01 M/UL (ref 4–5.4)
SODIUM SERPL-SCNC: 142 MMOL/L (ref 136–145)
WBC # BLD AUTO: 3.75 K/UL (ref 3.9–12.7)

## 2022-01-25 PROCEDURE — 94003 VENT MGMT INPAT SUBQ DAY: CPT

## 2022-01-25 PROCEDURE — 99900035 HC TECH TIME PER 15 MIN (STAT)

## 2022-01-25 PROCEDURE — 27000207 HC ISOLATION

## 2022-01-25 PROCEDURE — 85007 BL SMEAR W/DIFF WBC COUNT: CPT | Performed by: HOSPITALIST

## 2022-01-25 PROCEDURE — 80053 COMPREHEN METABOLIC PANEL: CPT | Performed by: HOSPITALIST

## 2022-01-25 PROCEDURE — A4216 STERILE WATER/SALINE, 10 ML: HCPCS | Performed by: HOSPITALIST

## 2022-01-25 PROCEDURE — 63600175 PHARM REV CODE 636 W HCPCS: Performed by: HOSPITALIST

## 2022-01-25 PROCEDURE — 85027 COMPLETE CBC AUTOMATED: CPT | Performed by: HOSPITALIST

## 2022-01-25 PROCEDURE — 25000242 PHARM REV CODE 250 ALT 637 W/ HCPCS: Performed by: HOSPITALIST

## 2022-01-25 PROCEDURE — 25000003 PHARM REV CODE 250: Performed by: HOSPITALIST

## 2022-01-25 PROCEDURE — 83605 ASSAY OF LACTIC ACID: CPT | Performed by: HOSPITALIST

## 2022-01-25 PROCEDURE — 94640 AIRWAY INHALATION TREATMENT: CPT

## 2022-01-25 PROCEDURE — 36415 COLL VENOUS BLD VENIPUNCTURE: CPT | Performed by: HOSPITALIST

## 2022-01-25 PROCEDURE — 43246 EGD PLACE GASTROSTOMY TUBE: CPT

## 2022-01-25 PROCEDURE — 94761 N-INVAS EAR/PLS OXIMETRY MLT: CPT

## 2022-01-25 PROCEDURE — 20000000 HC ICU ROOM

## 2022-01-25 PROCEDURE — 83735 ASSAY OF MAGNESIUM: CPT | Performed by: HOSPITALIST

## 2022-01-25 PROCEDURE — C1751 CATH, INF, PER/CENT/MIDLINE: HCPCS

## 2022-01-25 PROCEDURE — 84100 ASSAY OF PHOSPHORUS: CPT | Performed by: HOSPITALIST

## 2022-01-25 PROCEDURE — 99900026 HC AIRWAY MAINTENANCE (STAT)

## 2022-01-25 PROCEDURE — 27000221 HC OXYGEN, UP TO 24 HOURS

## 2022-01-25 PROCEDURE — 51702 INSERT TEMP BLADDER CATH: CPT

## 2022-01-25 RX ADMIN — IPRATROPIUM BROMIDE AND ALBUTEROL SULFATE 3 ML: 2.5; .5 SOLUTION RESPIRATORY (INHALATION) at 07:01

## 2022-01-25 RX ADMIN — HYPROMELLOSE 2910 1 DROP: 5 SOLUTION OPHTHALMIC at 05:01

## 2022-01-25 RX ADMIN — CLONAZEPAM 0.5 MG: 0.5 TABLET ORAL at 09:01

## 2022-01-25 RX ADMIN — HYPROMELLOSE 2910 1 DROP: 5 SOLUTION OPHTHALMIC at 08:01

## 2022-01-25 RX ADMIN — Medication 3 ML: at 05:01

## 2022-01-25 RX ADMIN — Medication 800 MG: at 11:01

## 2022-01-25 RX ADMIN — ENOXAPARIN SODIUM 40 MG: 100 INJECTION SUBCUTANEOUS at 04:01

## 2022-01-25 RX ADMIN — MIDODRINE HYDROCHLORIDE 10 MG: 5 TABLET ORAL at 09:01

## 2022-01-25 RX ADMIN — MEROPENEM 2 G: 1 INJECTION, POWDER, FOR SOLUTION INTRAVENOUS at 05:01

## 2022-01-25 RX ADMIN — DEXTROSE MONOHYDRATE 12.5 G: 500 INJECTION PARENTERAL at 06:01

## 2022-01-25 RX ADMIN — IPRATROPIUM BROMIDE AND ALBUTEROL SULFATE 3 ML: 2.5; .5 SOLUTION RESPIRATORY (INHALATION) at 12:01

## 2022-01-25 RX ADMIN — MIDODRINE HYDROCHLORIDE 10 MG: 5 TABLET ORAL at 02:01

## 2022-01-25 RX ADMIN — Medication 3 ML: at 01:01

## 2022-01-25 RX ADMIN — SENNOSIDES AND DOCUSATE SODIUM 1 TABLET: 8.6; 5 TABLET ORAL at 09:01

## 2022-01-25 RX ADMIN — Medication 800 MG: at 06:01

## 2022-01-25 RX ADMIN — MUPIROCIN: 20 OINTMENT TOPICAL at 08:01

## 2022-01-25 RX ADMIN — CLONAZEPAM 0.5 MG: 0.5 TABLET ORAL at 02:01

## 2022-01-25 RX ADMIN — CLONAZEPAM 0.5 MG: 0.5 TABLET ORAL at 08:01

## 2022-01-25 RX ADMIN — MEROPENEM 2 G: 1 INJECTION, POWDER, FOR SOLUTION INTRAVENOUS at 10:01

## 2022-01-25 RX ADMIN — Medication 3 ML: at 10:01

## 2022-01-25 RX ADMIN — MEROPENEM 2 G: 1 INJECTION, POWDER, FOR SOLUTION INTRAVENOUS at 03:01

## 2022-01-25 RX ADMIN — PANTOPRAZOLE SODIUM 40 MG: 40 TABLET, DELAYED RELEASE ORAL at 09:01

## 2022-01-25 RX ADMIN — MIDODRINE HYDROCHLORIDE 10 MG: 5 TABLET ORAL at 08:01

## 2022-01-25 RX ADMIN — SENNOSIDES AND DOCUSATE SODIUM 1 TABLET: 8.6; 5 TABLET ORAL at 08:01

## 2022-01-25 RX ADMIN — MUPIROCIN: 20 OINTMENT TOPICAL at 09:01

## 2022-01-25 RX ADMIN — ACETAMINOPHEN 1000 MG: 500 TABLET ORAL at 08:01

## 2022-01-25 NOTE — PLAN OF CARE
POC reviewed with patient, no evidence of understanding. Chronic vent to trach with ordered settings. Suctioning small white thick sputum from trach. Sinus rhythm. Dows warmer in use and core rectal temp initiated/monitored. Biliary drain with dark brown output. Gastric tube feedings started. Glucose monitored.  Hamilton to gravity. Heel boots in place. Safety measures and bed alarm in use.     Problem: Adult Inpatient Plan of Care  Goal: Plan of Care Review  Outcome: Ongoing, Progressing     Problem: Adult Inpatient Plan of Care  Goal: Optimal Comfort and Wellbeing  Outcome: Ongoing, Progressing     Problem: Communication Impairment (Mechanical Ventilation, Invasive)  Goal: Effective Communication  Outcome: Ongoing, Progressing     Problem: Inability to Wean (Mechanical Ventilation, Invasive)  Goal: Mechanical Ventilation Liberation  Outcome: Ongoing, Progressing     Problem: Communication Impairment (Artificial Airway)  Goal: Effective Communication  Outcome: Ongoing, Progressing     Problem: Infection  Goal: Absence of Infection Signs and Symptoms  Outcome: Ongoing, Progressing     Problem: Glycemic Control Impaired (Sepsis/Septic Shock)  Goal: Blood Glucose Level Within Desired Range  Outcome: Ongoing, Progressing     Problem: Nutrition Impaired (Sepsis/Septic Shock)  Goal: Optimal Nutrition Intake  Outcome: Ongoing, Progressing

## 2022-01-25 NOTE — ASSESSMENT & PLAN NOTE
Patient with Hypoxic Respiratory failure which is Chronic.  she is chronically on a ventilator.   Supplemental oxygen was provided and noted- Vent Mode: A/CMV-VC  Oxygen Concentration (%):  [30] 30  Resp Rate Total:  [24 br/min-37 br/min] 25 br/min  Vt Set:  [350 mL] 350 mL  PEEP/CPAP:  [5 cmH20-5.5 cmH20] 5.5 cmH20  Mean Airway Pressure:  [12.2 bbF57-03.5 cmH20] 12.2 cmH20.   Signs/symptoms of respiratory failure include- increased work of breathing and respiratory distress. Contributing diagnoses includes - Pleural effusion and Pneumonia Labs and images were reviewed. Patient Has recent ABG, which has been reviewed. Will treat underlying causes and adjust management of respiratory failure as follows- continue treatment for chronic respiratory failure.

## 2022-01-25 NOTE — ASSESSMENT & PLAN NOTE
Patient with no quality of life whatsoever, being kept alive on machines.  Last advanced care planning conversation and May of last year.  At that point in time family peer to want aggressive measures.  Will readdress with family if they still want to continue with the patient and this state perpetually.  Her chance of meaningful recovery is essentially nil.

## 2022-01-25 NOTE — PROGRESS NOTES
Pharmacokinetic Initial Assessment: IV Vancomycin    Assessment/Plan:    Initiate intravenous vancomycin with loading dose of 1250 mg once followed by a maintenance dose of vancomycin 1250mg IV every 12 hours  Desired empiric serum trough concentration is 15 to 20 mcg/mL  Draw vancomycin trough level 60 min prior to third dose on 1/25 at approximately 1830  Pharmacy will continue to follow and monitor vancomycin.      Please contact pharmacy at extension [phone] with any questions regarding this assessment.     Thank you for the consult,   Tray Suresh       Patient brief summary:  Genna Felix is a 61 y.o. female initiated on antimicrobial therapy with IV Vancomycin for treatment of suspected  pneumonia    Drug Allergies:   Review of patient's allergies indicates:  No Known Allergies    Actual Body Weight:   70 kg     Renal Function:   Estimated Creatinine Clearance: 105.8 mL/min (based on SCr of 0.5 mg/dL).,     Dialysis Method (if applicable):  N/A    CBC (last 72 hours):  Recent Labs   Lab Result Units 01/24/22  1302   WBC K/uL 4.00   Hemoglobin g/dL 8.4*   Hematocrit % 28.6*   Platelets K/uL 409   Gran % % 46.6   Lymph % % 35.5   Mono % % 12.5   Eosinophil % % 4.3   Basophil % % 0.8   Differential Method  Automated       Metabolic Panel (last 72 hours):  Recent Labs   Lab Result Units 01/24/22  1302 01/24/22  1310   Sodium mmol/L 144  --    Potassium mmol/L 4.2  --    Chloride mmol/L 108  --    CO2 mmol/L 24  --    Glucose mg/dL 66*  --    Glucose, UA   --  Negative   BUN mg/dL 16  --    Creatinine mg/dL 0.5  --    Albumin g/dL 2.4*  --    Total Bilirubin mg/dL 0.4  --    Alkaline Phosphatase U/L 130  --    AST U/L 24  --    ALT U/L 25  --        Drug levels (last 3 results):  No results for input(s): VANCOMYCINRA, VANCOMYCINPE, VANCOMYCINTR in the last 72 hours.    Microbiologic Results:  Microbiology Results (last 7 days)       Procedure Component Value Units Date/Time    Blood culture x two cultures. Draw prior  to antibiotics. [[id number]] Collected: 01/24/22 1413    Order Status: Sent Specimen: Blood Updated: 01/24/22 1414    Blood culture x two cultures. Draw prior to antibiotics. [[id number]] Collected: 01/24/22 1413    Order Status: Sent Specimen: Blood from Peripheral, Right Updated: 01/24/22 1414    Urine culture [[id number]] Collected: 01/24/22 1310    Order Status: No result Specimen: Urine Updated: 01/24/22 1346

## 2022-01-25 NOTE — ASSESSMENT & PLAN NOTE
Patient with known Cirrhosis with Child's class A.   MELD-Na score; MELD-Na score: 7 at 1/24/2022  1:02 PM  MELD score: 7 at 1/24/2022  1:02 PM  Calculated from:  Serum Creatinine: 0.5 mg/dL (Using min of 1 mg/dL) at 1/24/2022  1:02 PM  Serum Sodium: 144 mmol/L (Using max of 137 mmol/L) at 1/24/2022  1:02 PM  Total Bilirubin: 0.4 mg/dL (Using min of 1 mg/dL) at 1/24/2022  1:02 PM  INR(ratio): 1.1 at 1/24/2022  1:02 PM  Age: 61 years    Continue chronic meds. Etiology likely Hepatitis. Will avoid any hepatotoxic meds, and monitor CMP/INR for synthetic function.

## 2022-01-25 NOTE — ASSESSMENT & PLAN NOTE
"This patient does have evidence of infective focus  My overall impression is sepsis. Vital signs were reviewed and noted in progress note.  Antibiotics given-   Antibiotics (From admission, onward)            Start     Stop Route Frequency Ordered    01/24/22 1930  vancomycin 1.25 g in dextrose 5% 250 mL IVPB (ready to mix)         -- IV Every 12 hours (non-standard times) 01/24/22 1825    01/24/22 1815  meropenem (MERREM) 2 g in sodium chloride 0.9% 100 mL IVPB  ( Pneumonia - Moderate-High MDR )         01/29 1814 IV Every 8 hours (non-standard times) 01/24/22 1808    01/24/22 1620  vancomycin - pharmacy to dose  (vancomycin IVPB)        "And" Linked Group Details    -- IV pharmacy to manage frequency 01/24/22 1808        Cultures were taken-   Microbiology Results (last 7 days)     Procedure Component Value Units Date/Time    Blood culture x two cultures. Draw prior to antibiotics. [[id number]] Collected: 01/24/22 1413    Order Status: Sent Specimen: Blood Updated: 01/24/22 1414    Blood culture x two cultures. Draw prior to antibiotics. [[id number]] Collected: 01/24/22 1413    Order Status: Sent Specimen: Blood from Peripheral, Right Updated: 01/24/22 1414    Urine culture [[id number]] Collected: 01/24/22 1310    Order Status: No result Specimen: Urine Updated: 01/24/22 1346        Latest lactate reviewed, they are-  Recent Labs   Lab 01/24/22  1302 01/24/22  1647   LACTATE 0.9 1.0       Organ dysfunction indicated by Encephalopathy   Source- pneumonia versus abdominal    Source control Achieved by- follow-up CT scan in the morning    "

## 2022-01-25 NOTE — HPI
Patient is a 41-year-old  female with a past medical history significant for anoxic brain injury secondary to cardiac arrest and secondary persistent vegetative state who has chronic PEG/trach status with baseline GCS of approximately 4 who presents to the hospital after recent cholecystostomy tube placement for what appears to be sepsis.  Patient is nonverbal and unable to provide any further history.  Of the patient's outside facility, the patient was noted to be hypothermic.  Patient received vancomycin on the morning of admission, and was transition to the emergency department after hypothermia was noted.  No further history is unable to be elucidated given the patient's nonverbal status.

## 2022-01-25 NOTE — ASSESSMENT & PLAN NOTE
Patient's anemia is currently controlled. Has not received any PRBCs to date..   Current CBC reviewed-   Lab Results   Component Value Date    HGB 8.4 (L) 01/24/2022    HCT 28.6 (L) 01/24/2022     Monitor serial CBC and transfuse if patient becomes hemodynamically unstable, symptomatic or H/H drops below 7/21.

## 2022-01-25 NOTE — PLAN OF CARE
[facility]  Initial Discharge Assessment       Primary Care Provider: Primary Doctor No    Admission Diagnosis: Hypothermia [T68.XXXA]    Admission Date: 1/24/2022  Expected Discharge Date:      Discharge Barriers Identified: None    Payor: MEDICARE / Plan: MEDICARE PART A & B / Product Type: Government /     Extended Emergency Contact Information  Primary Emergency Contact: Amelia Hidalgo  Mobile Phone: [phone]  Relation: Sister  Preferred language: English   needed? No  Secondary Emergency Contact: IsidroStarr  Mobile Phone: [phone]  Relation: Daughter    Discharge Plan A: Return to nursing home  Discharge Plan B: Return to Nursing Home    CM completed assessment with Amelia Hidalgo (sister) [phone] via mobile phone, patient has an anoxic brain injury.  Amelia Hidalgo () [phone] verified that the patient is a resident of [facility], insurance is Medicare Part A& B and [place] Medicaid, and that the patient does not have a MPOA/Living Will but she is the point of contact.  Amelia Hidalgo () [phone] states the plan is for the patient to return back to [facility] once discharged.  Discharge plan is return to [facility], CM will follow to assist with discharge needs.        No Pharmacies Listed    Initial Assessment (most recent)       Adult Discharge Assessment - 01/25/22 1613          Discharge Assessment    Assessment Type Discharge Planning Assessment     Confirmed/corrected address, phone number and insurance Yes     Confirmed Demographics Correct on Facesheet     Source of Information family   Amelia Hidalgo (sister) [phone]    Reason For Admission Hypothermia     Lives With facility resident     Facility Arrived From: [facility]     Do you expect to return to your current living situation? Yes     Do you have help at home or someone to help you manage your care at home? Yes     Who are your caregiver(s) and their phone number(s)?  [facility] Staff     Prior to hospitilization cognitive status: --   Anoxic Brain Injury    Current cognitive status: Unable to Assess   Anoxic Brain Injury    Walking or Climbing Stairs Difficulty other (see comments);transferring difficulty, requires equipment;ambulation difficulty, assistance 1 person;ambulation difficulty, requires equipment;transferring difficulty, assistance 1 person   patient is bedbound    Mobility Management patient is bedbound     Dressing/Bathing Difficulty bathing difficulty, assistance 1 person;dressing difficulty, requires equipment     Dressing/Bathing Management [facility] Staff     Home Accessibility wheelchair accessible     Home Layout Able to live on 1st floor     Readmission within 30 days? No     Patient currently being followed by outpatient case management? No     Do you currently have service(s) that help you manage your care at home? No     Do you take prescription medications? Yes     Do you have prescription coverage? Yes     Coverage Medicaid [place] [employer]     Do you have any problems affording any of your prescribed medications? No     Is the patient taking medications as prescribed? yes     Who is going to help you get home at discharge? Ambulance     How do you get to doctors appointments? other (see comments)   [facility]    Are you on dialysis? No     Do you take coumadin? No     Discharge Plan A Return to nursing home     Discharge Plan B Return to Nursing Home     DME Needed Upon Discharge  --   TBD    Discharge Plan discussed with: Sibling     Name(s) and Number(s) Amelia Hidalgo (sister) [phone]     Discharge Barriers Identified None        Relationship/Environment    Name(s) of Who Lives With Patient [facility] Resident

## 2022-01-25 NOTE — ASSESSMENT & PLAN NOTE
"This patient does have evidence of infective focus  My overall impression is sepsis. Vital signs were reviewed and noted in progress note.  Antibiotics given-   Antibiotics (From admission, onward)            Start     Stop Route Frequency Ordered    01/25/22 1930  vancomycin 1.25 g in dextrose 5% 250 mL IVPB (ready to mix)         -- IV Every 24 hours (non-standard times) 01/24/22 2152 01/24/22 2230  mupirocin 2 % ointment         01/29 2059 Nasl 2 times daily 01/24/22 2122 01/24/22 1815  meropenem (MERREM) 2 g in sodium chloride 0.9% 100 mL IVPB  ( Pneumonia - Moderate-High MDR )         01/29 1814 IV Every 8 hours (non-standard times) 01/24/22 1808 01/24/22 1620  vancomycin - pharmacy to dose  (vancomycin IVPB)        "And" Linked Group Details    -- IV pharmacy to manage frequency 01/24/22 1808        Cultures were taken-   Microbiology Results (last 7 days)     Procedure Component Value Units Date/Time    Blood culture x two cultures. Draw prior to antibiotics. [[id number]] Collected: 01/24/22 1413    Order Status: Sent Specimen: Blood Updated: 01/24/22 1414    Blood culture x two cultures. Draw prior to antibiotics. [[id number]] Collected: 01/24/22 1413    Order Status: Sent Specimen: Blood from Peripheral, Right Updated: 01/24/22 1414    Urine culture [[id number]] Collected: 01/24/22 1310    Order Status: No result Specimen: Urine Updated: 01/24/22 1346        Latest lactate reviewed, they are-  Recent Labs   Lab 01/24/22  1302 01/24/22  1647   LACTATE 0.9 1.0       Organ dysfunction indicated by Encephalopathy   Source- pneumonia versus abdominal    Source control Achieved by- follow-up CT scan in the morning    "

## 2022-01-25 NOTE — PROGRESS NOTES
[facility] Hospital Medicine  Progress Note    Patient Name: Genna Felix  MRN: [id number]  Patient Class: IP- Inpatient   Admission Date: 1/24/2022  Length of Stay: 0 days  Attending Physician: Tolu Cross MD  Primary Care Provider: Primary Doctor No        Subjective:     Principal Problem:Severe sepsis        HPI:  Patient is a 41-year-old  female with a past medical history significant for anoxic brain injury secondary to cardiac arrest and secondary persistent vegetative state who has chronic PEG/trach status with baseline GCS of approximately 4 who presents to the hospital after recent cholecystostomy tube placement for what appears to be sepsis.  Patient is nonverbal and unable to provide any further history.  Of the patient's outside facility, the patient was noted to be hypothermic.  Patient received vancomycin on the morning of admission, and was transition to the emergency department after hypothermia was noted.  No further history is unable to be elucidated given the patient's nonverbal status.      Overview/Hospital Course:  No notes on file    Past Medical History:   Diagnosis Date    Anoxic brain damage 07/2020    Bedbound 07/2020    Cardiac arrest 07/2020    Cirrhosis     GERD (gastroesophageal reflux disease)     Hemangioma of intra-abdominal structure     Hypertension     Nursing home resident     Protein calorie malnutrition     Pulmonary embolus     Respiratory distress     S/P percutaneous endoscopic gastrostomy (PEG) tube placement 07/2020    Total self-care deficit 07/2020    Tracheostomy dependence     7/2020       Past Surgical History:   Procedure Laterality Date    PEG W/TRACHEOSTOMY PLACEMENT  07/27/2020    SKIN GRAFT      buttocks       Review of patient's allergies indicates:  No Known Allergies    No current facility-administered medications on file prior to encounter.     Current Outpatient Medications on File Prior to Encounter    Medication Sig    acetaminophen (TYLENOL) 325 MG tablet 2 tablets (650 mg total) by Per G Tube route every 4 (four) hours as needed for Pain or Temperature greater than (100.4F).    acetylcysteine 100 mg/ml, 10%, (MUCOMYST) 100 mg/mL (10 %) nebulizer solution Take 4 mLs by nebulization every 8 (eight) hours.    albuterol-ipratropium (DUO-NEB) 2.5 mg-0.5 mg/3 mL nebulizer solution Take 3 mLs by nebulization every 6 (six) hours. Rescue    artificial tears (ISOPTO TEARS) 0.5 % ophthalmic solution Place 1 drop into both eyes as needed.    clonazePAM (KLONOPIN) 0.5 MG tablet 1 tablet (0.5 mg total) by Per G Tube route 3 (three) times daily.    ergocalciferol (ERGOCALCIFEROL) 50,000 unit Cap Take 50,000 Units by mouth every 7 days.    ferrous sulfate 300 mg (60 mg iron)/5 mL syrup Take 300 mg by mouth As instructed. Every other day    meropenem-0.9% sodium chloride 500 mg/50 mL PgBk Inject 50 mLs (500 mg total) into the vein every 8 (eight) hours.    midodrine (PROAMATINE) 10 MG tablet 1 tablet (10 mg total) by Per G Tube route 3 (three) times daily.    polyethylene glycol (GLYCOLAX) 17 gram PwPk Take by mouth daily as needed.    vancomycin HCl (VANCOMYCIN 1.25 G/250 ML D5W, READY TO MIX,) Inject 250 mLs (1,250 mg total) into the vein once daily. for 7 days     Family History     Problem Relation (Age of Onset)    No Known Problems Mother, Father        Tobacco Use    Smoking status: Never Smoker    Smokeless tobacco: Never Used   Substance and Sexual Activity    Alcohol use: Not Currently    Drug use: Not Currently    Sexual activity: Not Currently     Review of Systems   Unable to perform ROS: Patient unresponsive     Objective:     Vital Signs (Most Recent):  Temp: 96.5 °F (35.8 °C) (01/24/22 1815)  Pulse: 73 (01/24/22 1920)  Resp: (!) 24 (01/24/22 1920)  BP: (!) 140/68 (01/24/22 1920)  SpO2: 97 % (01/24/22 1920) Vital Signs (24h Range):  Temp:  [94.6 °F (34.8 °C)-96.5 °F (35.8 °C)] 96.5 °F (35.8  °C)  Pulse:  [65-78] 73  Resp:  [24-60] 24  SpO2:  [96 %-100 %] 97 %  BP: ()/(57-76) 140/68     Weight: 70 kg (154 lb 5.2 oz)  Body mass index is 30.14 kg/m².    Physical Exam  Vitals and nursing note reviewed.   Constitutional:       General: She is not in acute distress.     Appearance: She is ill-appearing. She is not diaphoretic.   HENT:      Mouth/Throat:      Pharynx: No oropharyngeal exudate.   Eyes:      General:         Right eye: No discharge.         Left eye: No discharge.      Comments: Pupils sluggish   Neck:      Thyroid: No thyromegaly.      Vascular: No JVD.      Trachea: No tracheal deviation.      Comments: tracheostomy in place  Cardiovascular:      Rate and Rhythm: Normal rate and regular rhythm.      Heart sounds: No murmur heard.  No friction rub. No gallop.    Pulmonary:      Effort: No respiratory distress.      Breath sounds: Wheezing and rhonchi present. No rales.      Comments: Breath sounds coarse on ventilator.  Abdominal:      General: There is no distension.      Palpations: Abdomen is soft.      Tenderness: There is no abdominal tenderness.      Comments: Percutaneous gastrostomy in place  Cholecystostomy in place   Genitourinary:     Comments: Hamilton catheter noted in place.  Musculoskeletal:         General: No tenderness or deformity.   Skin:     Capillary Refill: Capillary refill takes 2 to 3 seconds.      Findings: No erythema or rash.   Neurological:      Mental Status: Mental status is at baseline. She is disoriented.      Motor: No abnormal muscle tone.      Deep Tendon Reflexes: Reflexes normal.      Comments: Patient sedated, unresponsive at present             Significant Labs: All pertinent labs within the past 24 hours have been reviewed.    Significant Imaging: I have reviewed all pertinent imaging results/findings within the past 24 hours.      Assessment/Plan:      * Severe sepsis  This patient does have evidence of infective focus  My overall impression is  "sepsis. Vital signs were reviewed and noted in progress note.  Antibiotics given-   Antibiotics (From admission, onward)            Start     Stop Route Frequency Ordered    01/24/22 1930  vancomycin 1.25 g in dextrose 5% 250 mL IVPB (ready to mix)         -- IV Every 12 hours (non-standard times) 01/24/22 1825    01/24/22 1815  meropenem (MERREM) 2 g in sodium chloride 0.9% 100 mL IVPB  ( Pneumonia - Moderate-High MDR )         01/29 1814 IV Every 8 hours (non-standard times) 01/24/22 1808    01/24/22 1620  vancomycin - pharmacy to dose  (vancomycin IVPB)        "And" Linked Group Details    -- IV pharmacy to manage frequency 01/24/22 1808        Cultures were taken-   Microbiology Results (last 7 days)     Procedure Component Value Units Date/Time    Blood culture x two cultures. Draw prior to antibiotics. [[id number]] Collected: 01/24/22 1413    Order Status: Sent Specimen: Blood Updated: 01/24/22 1414    Blood culture x two cultures. Draw prior to antibiotics. [[id number]] Collected: 01/24/22 1413    Order Status: Sent Specimen: Blood from Peripheral, Right Updated: 01/24/22 1414    Urine culture [[id number]] Collected: 01/24/22 1310    Order Status: No result Specimen: Urine Updated: 01/24/22 1346        Latest lactate reviewed, they are-  Recent Labs   Lab 01/24/22  1302 01/24/22  1647   LACTATE 0.9 1.0       Organ dysfunction indicated by Encephalopathy   Source- pneumonia versus abdominal    Source control Achieved by- follow-up CT scan in the morning      Acute on chronic respiratory failure with hypoxia  Patient with Hypoxic Respiratory failure which is Chronic.  she is chronically on a ventilator.   Supplemental oxygen was provided and noted- Vent Mode: A/CMV-VC  Oxygen Concentration (%):  [30] 30  Resp Rate Total:  [24 br/min-37 br/min] 25 br/min  Vt Set:  [350 mL] 350 mL  PEEP/CPAP:  [5 cmH20-5.5 cmH20] 5.5 cmH20  Mean Airway Pressure:  [12.2 qrO36-26.5 cmH20] 12.2 cmH20.   Signs/symptoms of respiratory " "failure include- increased work of breathing and respiratory distress. Contributing diagnoses includes - Pleural effusion and Pneumonia Labs and images were reviewed. Patient Has recent ABG, which has been reviewed. Will treat underlying causes and adjust management of respiratory failure as follows- continue treatment for chronic respiratory failure.    Ventilator associated pneumonia  Patient with current diagnosis of pneumonia with concern for bacterial etiology due to  MRSA and Pseudomonas which is uncontrolled due to persistent hypoxia . I have reviewed the pertinent imaging. Current antimicrobial regimen consists of   Antibiotics (From admission, onward)            Start     Stop Route Frequency Ordered    01/24/22 1930  vancomycin 1.25 g in dextrose 5% 250 mL IVPB (ready to mix)         -- IV Every 12 hours (non-standard times) 01/24/22 1825 01/24/22 1815  meropenem (MERREM) 2 g in sodium chloride 0.9% 100 mL IVPB  ( Pneumonia - Moderate-High MDR )         01/29 1814 IV Every 8 hours (non-standard times) 01/24/22 1808 01/24/22 1620  vancomycin - pharmacy to dose  (vancomycin IVPB)        "And" Linked Group Details    -- IV pharmacy to manage frequency 01/24/22 1808      . Cultures drawn and noted-   Microbiology Results (last 7 days)     Procedure Component Value Units Date/Time    Blood culture x two cultures. Draw prior to antibiotics. [[id number]] Collected: 01/24/22 1413    Order Status: Sent Specimen: Blood Updated: 01/24/22 1414    Blood culture x two cultures. Draw prior to antibiotics. [[id number]] Collected: 01/24/22 1413    Order Status: Sent Specimen: Blood from Peripheral, Right Updated: 01/24/22 1414    Urine culture [[id number]] Collected: 01/24/22 1310    Order Status: No result Specimen: Urine Updated: 01/24/22 1346       Will monitor patient closely and continue current treatment plan unchanged.        Cirrhosis of liver  Patient with known Cirrhosis with Child's class A.   MELD-Na score; " MELD-Na score: 7 at 1/24/2022  1:02 PM  MELD score: 7 at 1/24/2022  1:02 PM  Calculated from:  Serum Creatinine: 0.5 mg/dL (Using min of 1 mg/dL) at 1/24/2022  1:02 PM  Serum Sodium: 144 mmol/L (Using max of 137 mmol/L) at 1/24/2022  1:02 PM  Total Bilirubin: 0.4 mg/dL (Using min of 1 mg/dL) at 1/24/2022  1:02 PM  INR(ratio): 1.1 at 1/24/2022  1:02 PM  Age: 61 years    Continue chronic meds. Etiology likely Hepatitis. Will avoid any hepatotoxic meds, and monitor CMP/INR for synthetic function.         Pressure injury of left buttock, stage 4  Consult wound care      Difficult Hamilton catheter placement  Continue Hamilton      Gastroesophageal reflux disease  PPI indicated      Essential hypertension  Patient borderline hypotensive.  Hold antihypertensives at this point in time.      PEG (percutaneous endoscopic gastrostomy) status  Continue routine percutaneous gastrostomy care      Tracheostomy dependence  Continue tracheostomy care      Advance care planning  Patient with no quality of life whatsoever, being kept alive on machines.  Last advanced care planning conversation and May of last year.  At that point in time family peer to want aggressive measures.  Will readdress with family if they still want to continue with the patient and this state perpetually.  Her chance of meaningful recovery is essentially nil.       Persistent vegetative state  Patient with baseline GCS approximately 4-5.  Very poor long-term survival.  Family not present for conversation at this point in time.      Anemia of chronic disease  Patient's anemia is currently controlled. Has not received any PRBCs to date..   Current CBC reviewed-   Lab Results   Component Value Date    HGB 8.4 (L) 01/24/2022    HCT 28.6 (L) 01/24/2022     Monitor serial CBC and transfuse if patient becomes hemodynamically unstable, symptomatic or H/H drops below 7/21.           VTE Risk Mitigation (From admission, onward)         Ordered     enoxaparin injection 40 mg   Daily         01/24/22 1808     IP VTE HIGH RISK PATIENT  Once         01/24/22 1808                Discharge Planning   [place]:      Code Status: Full Code   Is the patient medically ready for discharge?:     Reason for patient still in hospital (select all that apply): Treatment               Critical care time spent on the evaluation and treatment of severe organ dysfunction, review of pertinent labs and imaging studies, discussions with consulting providers and discussions with patient/family: 38 minutes.      Tolu Cross MD  Department of Hospital Medicine   [facility]

## 2022-01-25 NOTE — ASSESSMENT & PLAN NOTE
Patient with Hypoxic Respiratory failure which is Chronic.  she is chronically on a ventilator.   Supplemental oxygen was provided and noted- Vent Mode: A/CMV-VC  Oxygen Concentration (%):  [30] 30  Resp Rate Total:  [24 br/min-37 br/min] 25 br/min  Vt Set:  [350 mL] 350 mL  PEEP/CPAP:  [5 cmH20-5.5 cmH20] 5.5 cmH20  Mean Airway Pressure:  [12.2 rlU38-59.5 cmH20] 12.2 cmH20.   Signs/symptoms of respiratory failure include- increased work of breathing and respiratory distress. Contributing diagnoses includes - Pleural effusion and Pneumonia Labs and images were reviewed. Patient Has recent ABG, which has been reviewed. Will treat underlying causes and adjust management of respiratory failure as follows- continue treatment for chronic respiratory failure.

## 2022-01-25 NOTE — SUBJECTIVE & OBJECTIVE
Past Medical History:   Diagnosis Date    Anoxic brain damage 07/2020    Bedbound 07/2020    Cardiac arrest 07/2020    Cirrhosis     GERD (gastroesophageal reflux disease)     Hemangioma of intra-abdominal structure     Hypertension     Nursing home resident     Protein calorie malnutrition     Pulmonary embolus     Respiratory distress     S/P percutaneous endoscopic gastrostomy (PEG) tube placement 07/2020    Total self-care deficit 07/2020    Tracheostomy dependence     7/2020       Past Surgical History:   Procedure Laterality Date    PEG W/TRACHEOSTOMY PLACEMENT  07/27/2020    SKIN GRAFT      buttocks       Review of patient's allergies indicates:  No Known Allergies    No current facility-administered medications on file prior to encounter.     Current Outpatient Medications on File Prior to Encounter   Medication Sig    acetaminophen (TYLENOL) 325 MG tablet 2 tablets (650 mg total) by Per G Tube route every 4 (four) hours as needed for Pain or Temperature greater than (100.4F).    acetylcysteine 100 mg/ml, 10%, (MUCOMYST) 100 mg/mL (10 %) nebulizer solution Take 4 mLs by nebulization every 8 (eight) hours.    albuterol-ipratropium (DUO-NEB) 2.5 mg-0.5 mg/3 mL nebulizer solution Take 3 mLs by nebulization every 6 (six) hours. Rescue    artificial tears (ISOPTO TEARS) 0.5 % ophthalmic solution Place 1 drop into both eyes as needed.    clonazePAM (KLONOPIN) 0.5 MG tablet 1 tablet (0.5 mg total) by Per G Tube route 3 (three) times daily.    ergocalciferol (ERGOCALCIFEROL) 50,000 unit Cap Take 50,000 Units by mouth every 7 days.    ferrous sulfate 300 mg (60 mg iron)/5 mL syrup Take 300 mg by mouth As instructed. Every other day    meropenem-0.9% sodium chloride 500 mg/50 mL PgBk Inject 50 mLs (500 mg total) into the vein every 8 (eight) hours.    midodrine (PROAMATINE) 10 MG tablet 1 tablet (10 mg total) by Per G Tube route 3 (three) times daily.    polyethylene glycol (GLYCOLAX) 17 gram  PwPk Take by mouth daily as needed.    vancomycin HCl (VANCOMYCIN 1.25 G/250 ML D5W, READY TO MIX,) Inject 250 mLs (1,250 mg total) into the vein once daily. for 7 days     Family History     Problem Relation (Age of Onset)    No Known Problems Mother, Father        Tobacco Use    Smoking status: Never Smoker    Smokeless tobacco: Never Used   Substance and Sexual Activity    Alcohol use: Not Currently    Drug use: Not Currently    Sexual activity: Not Currently     Review of Systems   Unable to perform ROS: Patient unresponsive     Objective:     Vital Signs (Most Recent):  Temp: 96.5 °F (35.8 °C) (01/24/22 1815)  Pulse: 73 (01/24/22 1920)  Resp: (!) 24 (01/24/22 1920)  BP: (!) 140/68 (01/24/22 1920)  SpO2: 97 % (01/24/22 1920) Vital Signs (24h Range):  Temp:  [94.6 °F (34.8 °C)-96.5 °F (35.8 °C)] 96.5 °F (35.8 °C)  Pulse:  [65-78] 73  Resp:  [24-60] 24  SpO2:  [96 %-100 %] 97 %  BP: ()/(57-76) 140/68     Weight: 70 kg (154 lb 5.2 oz)  Body mass index is 30.14 kg/m².    Physical Exam  Vitals and nursing note reviewed.   Constitutional:       General: She is not in acute distress.     Appearance: She is ill-appearing. She is not diaphoretic.   HENT:      Mouth/Throat:      Pharynx: No oropharyngeal exudate.   Eyes:      General:         Right eye: No discharge.         Left eye: No discharge.      Comments: Pupils sluggish   Neck:      Thyroid: No thyromegaly.      Vascular: No JVD.      Trachea: No tracheal deviation.      Comments: tracheostomy in place  Cardiovascular:      Rate and Rhythm: Normal rate and regular rhythm.      Heart sounds: No murmur heard.  No friction rub. No gallop.    Pulmonary:      Effort: No respiratory distress.      Breath sounds: Wheezing and rhonchi present. No rales.      Comments: Breath sounds coarse on ventilator.  Abdominal:      General: There is no distension.      Palpations: Abdomen is soft.      Tenderness: There is no abdominal tenderness.      Comments:  Percutaneous gastrostomy in place  Cholecystostomy in place   Genitourinary:     Comments: Hamilton catheter noted in place.  Musculoskeletal:         General: No tenderness or deformity.   Skin:     Capillary Refill: Capillary refill takes 2 to 3 seconds.      Findings: No erythema or rash.   Neurological:      Mental Status: Mental status is at baseline. She is disoriented.      Motor: No abnormal muscle tone.      Deep Tendon Reflexes: Reflexes normal.      Comments: Patient sedated, unresponsive at present             Significant Labs: All pertinent labs within the past 24 hours have been reviewed.    Significant Imaging: I have reviewed all pertinent imaging results/findings within the past 24 hours.

## 2022-01-25 NOTE — ASSESSMENT & PLAN NOTE
Patient with baseline GCS approximately 4-5.  Very poor long-term survival.  Family not present for conversation at this point in time.

## 2022-01-25 NOTE — CARE UPDATE
01/25/22 0424   Patient Assessment/Suction   Respiratory Effort Unlabored   All Lung Fields Breath Sounds diminished   Rhythm/Pattern, Respiratory assisted mechanically   Secretions Amount scant   Secretions Color yellow   Secretions Characteristics thick   PRE-TX-O2   O2 Device (Oxygen Therapy) ventilator   Oxygen Concentration (%) 30   SpO2 100 %   Pulse Oximetry Type Continuous   Pulse 65   Resp (!) 24        Surgical Airway Portex Cuffed   No placement date or time found.   Present Prior to Hospital Arrival?: Yes  Type: Tracheostomy  Brand: Portex  Airway Device Size: 8.0  Style: Cuffed   Cuff Inflation? Inflated   Status Secured   Site Assessment Clean;Dry   Site Care Dressing applied   Inner Cannula Care Changed/new   Ties Assessment Clean;Dry;Intact;Secure   Vent Select   Conventional Vent Y   Charged w/in last 24h YES   Preset Conventional Ventilator Settings   Vent Type NKV-550   Ventilation Type VC   Vent Mode A/CMV-VC   Humidity HME   Set Rate 24 BPM   Vt Set 350 mL   PEEP/CPAP 4.9 cmH20   I:E Ratio Set 1:2.1   I-Trigger Type  Flow   Trigger Sensitivity Flow/I-Trigger 1.5 L/min   Patient Ventilator Parameters   Resp Rate Total 25 br/min   Peak Airway Pressure 40.8 cmH2O   Mean Airway Pressure 14.2 cmH20   Plateau Pressure 21.4 cmH20   Exhaled Vt 338 mL   Total Ve 8.41 mL   I:E Ratio Measured 1:2.1   Total PEEP 5.1 cmH20   Inspired Tidal Volume (VTI) 363 mL   Conventional Ventilator Alarms   Alarms On Y   Ve High Alarm 15 L/min   Ve Low Alarm 1.5 L/min   Resp Rate High Alarm 50 br/min   Resp Rate Low Alarm 20   Press High Alarm 65 cmH2O   Apnea Rate 15   Apnea Volume (mL) 300 mL   Ready to Wean/Extubation Screen   FIO2<=50 (chart decimal) 0.3   MV<16L (chart vol.) 8.41   PEEP <=8 (chart #) 4.9   Ready to Wean Parameters   F/VT Ratio<105 (RSBI) (!) 71.01

## 2022-01-25 NOTE — ASSESSMENT & PLAN NOTE
"Patient with current diagnosis of pneumonia with concern for bacterial etiology due to  MRSA and Pseudomonas which is uncontrolled due to persistent hypoxia . I have reviewed the pertinent imaging. Current antimicrobial regimen consists of   Antibiotics (From admission, onward)            Start     Stop Route Frequency Ordered    01/25/22 1930  vancomycin 1.25 g in dextrose 5% 250 mL IVPB (ready to mix)         -- IV Every 24 hours (non-standard times) 01/24/22 2152 01/24/22 2230  mupirocin 2 % ointment         01/29 2059 Nasl 2 times daily 01/24/22 2122 01/24/22 1815  meropenem (MERREM) 2 g in sodium chloride 0.9% 100 mL IVPB  ( Pneumonia - Moderate-High MDR )         01/29 1814 IV Every 8 hours (non-standard times) 01/24/22 1808 01/24/22 1620  vancomycin - pharmacy to dose  (vancomycin IVPB)        "And" Linked Group Details    -- IV pharmacy to manage frequency 01/24/22 1808      . Cultures drawn and noted-   Microbiology Results (last 7 days)     Procedure Component Value Units Date/Time    Blood culture x two cultures. Draw prior to antibiotics. [[id number]] Collected: 01/24/22 1413    Order Status: Sent Specimen: Blood Updated: 01/24/22 1414    Blood culture x two cultures. Draw prior to antibiotics. [[id number]] Collected: 01/24/22 1413    Order Status: Sent Specimen: Blood from Peripheral, Right Updated: 01/24/22 1414    Urine culture [[id number]] Collected: 01/24/22 1310    Order Status: No result Specimen: Urine Updated: 01/24/22 1346       Will monitor patient closely and continue current treatment plan unchanged.      "

## 2022-01-25 NOTE — CONSULTS
Addendum to previous consult note:    Pharmacokinetic Initial Assessment: IV Vancomycin    Assessment/Plan:    Initiate intravenous vancomycin with dose of vancomycin 1250mg IV every 24 hours  Desired empiric serum trough concentration is 15 to 20 mcg/mL  Draw vancomycin trough level 60 min prior to third dose on 01/26/22 at approximately 1830  Pharmacy will continue to follow and monitor vancomycin.      Please contact pharmacy at extension [phone] with any questions regarding this assessment.     Thank you for the consult,   Andrei Spencer       Patient brief summary:  Genna Felix is a 61 y.o. female initiated on antimicrobial therapy with IV Vancomycin for treatment of suspected sepsis    Drug Allergies:   Review of patient's allergies indicates:  No Known Allergies    Actual Body Weight:   70kg    Renal Function:   Estimated Creatinine Clearance: 105.8 mL/min (based on SCr of 0.5 mg/dL).,     CBC (last 72 hours):  Recent Labs   Lab Result Units 01/24/22  1302   WBC K/uL 4.00   Hemoglobin g/dL 8.4*   Hematocrit % 28.6*   Platelets K/uL 409   Gran % % 46.6   Lymph % % 35.5   Mono % % 12.5   Eosinophil % % 4.3   Basophil % % 0.8   Differential Method  Automated       Metabolic Panel (last 72 hours):  Recent Labs   Lab Result Units 01/24/22  1302 01/24/22  1310   Sodium mmol/L 144  --    Potassium mmol/L 4.2  --    Chloride mmol/L 108  --    CO2 mmol/L 24  --    Glucose mg/dL 66*  --    Glucose, UA   --  Negative   BUN mg/dL 16  --    Creatinine mg/dL 0.5  --    Albumin g/dL 2.4*  --    Total Bilirubin mg/dL 0.4  --    Alkaline Phosphatase U/L 130  --    AST U/L 24  --    ALT U/L 25  --        Drug levels (last 3 results):  No results for input(s): VANCOMYCINRA, VANCOMYCINPE, VANCOMYCINTR in the last 72 hours.    Microbiologic Results:  Microbiology Results (last 7 days)       Procedure Component Value Units Date/Time    Blood culture x two cultures. Draw prior to antibiotics. [[id number]] Collected: 01/24/22 3366     Order Status: Sent Specimen: Blood Updated: 01/24/22 1414    Blood culture x two cultures. Draw prior to antibiotics. [[id number]] Collected: 01/24/22 1413    Order Status: Sent Specimen: Blood from Peripheral, Right Updated: 01/24/22 1414    Urine culture [[id number]] Collected: 01/24/22 1310    Order Status: No result Specimen: Urine Updated: 01/24/22 1346

## 2022-01-25 NOTE — H&P
[facility] Medicine  History & Physical    Patient Name: Genna Felix  MRN: [id number]  Patient Class: IP- Inpatient  Admission Date: 1/24/2022  Attending Physician: Tolu Cross MD  Primary Care Provider: Primary Doctor No         Patient information was obtained from past medical records and ER records.     Subjective:     Principal Problem:Severe sepsis    Chief Complaint:   Chief Complaint   Patient presents with    Hyperventilating     Hypothermia  / from [facility]        HPI: Patient is a 41-year-old  female with a past medical history significant for anoxic brain injury secondary to cardiac arrest and secondary persistent vegetative state who has chronic PEG/trach status with baseline GCS of approximately 4 who presents to the hospital after recent cholecystostomy tube placement for what appears to be sepsis.  Patient is nonverbal and unable to provide any further history.  Of the patient's outside facility, the patient was noted to be hypothermic.  Patient received vancomycin on the morning of admission, and was transition to the emergency department after hypothermia was noted.  No further history is unable to be elucidated given the patient's nonverbal status.      Past Medical History:   Diagnosis Date    Anoxic brain damage 07/2020    Bedbound 07/2020    Cardiac arrest 07/2020    Cirrhosis     GERD (gastroesophageal reflux disease)     Hemangioma of intra-abdominal structure     Hypertension     Nursing home resident     Protein calorie malnutrition     Pulmonary embolus     Respiratory distress     S/P percutaneous endoscopic gastrostomy (PEG) tube placement 07/2020    Total self-care deficit 07/2020    Tracheostomy dependence     7/2020       Past Surgical History:   Procedure Laterality Date    PEG W/TRACHEOSTOMY PLACEMENT  07/27/2020    SKIN GRAFT      buttocks       Review of patient's allergies indicates:  No Known Allergies    No  current facility-administered medications on file prior to encounter.     Current Outpatient Medications on File Prior to Encounter   Medication Sig    acetaminophen (TYLENOL) 325 MG tablet 2 tablets (650 mg total) by Per G Tube route every 4 (four) hours as needed for Pain or Temperature greater than (100.4F).    acetylcysteine 100 mg/ml, 10%, (MUCOMYST) 100 mg/mL (10 %) nebulizer solution Take 4 mLs by nebulization every 8 (eight) hours.    albuterol-ipratropium (DUO-NEB) 2.5 mg-0.5 mg/3 mL nebulizer solution Take 3 mLs by nebulization every 6 (six) hours. Rescue    artificial tears (ISOPTO TEARS) 0.5 % ophthalmic solution Place 1 drop into both eyes as needed.    clonazePAM (KLONOPIN) 0.5 MG tablet 1 tablet (0.5 mg total) by Per G Tube route 3 (three) times daily.    ergocalciferol (ERGOCALCIFEROL) 50,000 unit Cap Take 50,000 Units by mouth every 7 days.    ferrous sulfate 300 mg (60 mg iron)/5 mL syrup Take 300 mg by mouth As instructed. Every other day    meropenem-0.9% sodium chloride 500 mg/50 mL PgBk Inject 50 mLs (500 mg total) into the vein every 8 (eight) hours.    midodrine (PROAMATINE) 10 MG tablet 1 tablet (10 mg total) by Per G Tube route 3 (three) times daily.    polyethylene glycol (GLYCOLAX) 17 gram PwPk Take by mouth daily as needed.    vancomycin HCl (VANCOMYCIN 1.25 G/250 ML D5W, READY TO MIX,) Inject 250 mLs (1,250 mg total) into the vein once daily. for 7 days     Family History     Problem Relation (Age of Onset)    No Known Problems Mother, Father        Tobacco Use    Smoking status: Never Smoker    Smokeless tobacco: Never Used   Substance and Sexual Activity    Alcohol use: Not Currently    Drug use: Not Currently    Sexual activity: Not Currently     Review of Systems   Unable to perform ROS: Patient unresponsive     Objective:     Vital Signs (Most Recent):  Temp: 96.5 °F (35.8 °C) (01/24/22 1815)  Pulse: 73 (01/24/22 1920)  Resp: (!) 24 (01/24/22 1920)  BP: (!) 140/68  (01/24/22 1920)  SpO2: 97 % (01/24/22 2151) Vital Signs (24h Range):  Temp:  [94.6 °F (34.8 °C)-96.5 °F (35.8 °C)] 96.5 °F (35.8 °C)  Pulse:  [65-78] 73  Resp:  [24-60] 24  SpO2:  [96 %-100 %] 97 %  BP: ()/(57-76) 140/68     Weight: 70 kg (154 lb 5.2 oz)  Body mass index is 30.14 kg/m².    Physical Exam  Vitals and nursing note reviewed.   Constitutional:       General: She is not in acute distress.     Appearance: She is ill-appearing. She is not diaphoretic.   HENT:      Mouth/Throat:      Pharynx: No oropharyngeal exudate.   Eyes:      General:         Right eye: No discharge.         Left eye: No discharge.      Comments: Pupils sluggish   Neck:      Thyroid: No thyromegaly.      Vascular: No JVD.      Trachea: No tracheal deviation.      Comments: tracheostomy in place  Cardiovascular:      Rate and Rhythm: Normal rate and regular rhythm.      Heart sounds: No murmur heard.  No friction rub. No gallop.    Pulmonary:      Effort: No respiratory distress.      Breath sounds: Wheezing and rhonchi present. No rales.      Comments: Breath sounds coarse on ventilator.  Abdominal:      General: There is no distension.      Palpations: Abdomen is soft.      Tenderness: There is no abdominal tenderness.      Comments: Percutaneous gastrostomy in place  Cholecystostomy in place   Genitourinary:     Comments: Hamilton catheter noted in place.  Musculoskeletal:         General: No tenderness or deformity.   Skin:     Capillary Refill: Capillary refill takes 2 to 3 seconds.      Findings: No erythema or rash.   Neurological:      Mental Status: Mental status is at baseline. She is disoriented.      Motor: No abnormal muscle tone.      Deep Tendon Reflexes: Reflexes normal.      Comments: Patient sedated, unresponsive at present     Significant Labs: All pertinent labs within the past 24 hours have been reviewed.    Significant Imaging: I have reviewed all pertinent imaging results/findings within the past 24  "hours.    Assessment/Plan:     * Severe sepsis  This patient does have evidence of infective focus  My overall impression is sepsis. Vital signs were reviewed and noted in progress note.  Antibiotics given-   Antibiotics (From admission, onward)            Start     Stop Route Frequency Ordered    01/25/22 1930  vancomycin 1.25 g in dextrose 5% 250 mL IVPB (ready to mix)         -- IV Every 24 hours (non-standard times) 01/24/22 2152 01/24/22 2230  mupirocin 2 % ointment         01/29 2059 Nasl 2 times daily 01/24/22 2122 01/24/22 1815  meropenem (MERREM) 2 g in sodium chloride 0.9% 100 mL IVPB  ( Pneumonia - Moderate-High MDR )         01/29 1814 IV Every 8 hours (non-standard times) 01/24/22 1808 01/24/22 1620  vancomycin - pharmacy to dose  (vancomycin IVPB)        "And" Linked Group Details    -- IV pharmacy to manage frequency 01/24/22 1808        Cultures were taken-   Microbiology Results (last 7 days)     Procedure Component Value Units Date/Time    Blood culture x two cultures. Draw prior to antibiotics. [[id number]] Collected: 01/24/22 1413    Order Status: Sent Specimen: Blood Updated: 01/24/22 1414    Blood culture x two cultures. Draw prior to antibiotics. [[id number]] Collected: 01/24/22 1413    Order Status: Sent Specimen: Blood from Peripheral, Right Updated: 01/24/22 1414    Urine culture [[id number]] Collected: 01/24/22 1310    Order Status: No result Specimen: Urine Updated: 01/24/22 1346        Latest lactate reviewed, they are-  Recent Labs   Lab 01/24/22  1302 01/24/22  1647   LACTATE 0.9 1.0       Organ dysfunction indicated by Encephalopathy   Source- pneumonia versus abdominal    Source control Achieved by- follow-up CT scan in the morning      Acute on chronic respiratory failure with hypoxia  Patient with Hypoxic Respiratory failure which is Chronic.  she is chronically on a ventilator.   Supplemental oxygen was provided and noted- Vent Mode: A/CMV-VC  Oxygen Concentration (%):  [30] " "30  Resp Rate Total:  [24 br/min-37 br/min] 25 br/min  Vt Set:  [350 mL] 350 mL  PEEP/CPAP:  [5 cmH20-5.5 cmH20] 5.5 cmH20  Mean Airway Pressure:  [12.2 gmL57-08.5 cmH20] 12.2 cmH20.   Signs/symptoms of respiratory failure include- increased work of breathing and respiratory distress. Contributing diagnoses includes - Pleural effusion and Pneumonia Labs and images were reviewed. Patient Has recent ABG, which has been reviewed. Will treat underlying causes and adjust management of respiratory failure as follows- continue treatment for chronic respiratory failure.    Ventilator associated pneumonia  Patient with current diagnosis of pneumonia with concern for bacterial etiology due to  MRSA and Pseudomonas which is uncontrolled due to persistent hypoxia . I have reviewed the pertinent imaging. Current antimicrobial regimen consists of   Antibiotics (From admission, onward)            Start     Stop Route Frequency Ordered    01/25/22 1930  vancomycin 1.25 g in dextrose 5% 250 mL IVPB (ready to mix)         -- IV Every 24 hours (non-standard times) 01/24/22 2152 01/24/22 2230  mupirocin 2 % ointment         01/29 2059 Nasl 2 times daily 01/24/22 2122 01/24/22 1815  meropenem (MERREM) 2 g in sodium chloride 0.9% 100 mL IVPB  ( Pneumonia - Moderate-High MDR )         01/29 1814 IV Every 8 hours (non-standard times) 01/24/22 1808    01/24/22 1620  vancomycin - pharmacy to dose  (vancomycin IVPB)        "And" Linked Group Details    -- IV pharmacy to manage frequency 01/24/22 1808      . Cultures drawn and noted-   Microbiology Results (last 7 days)     Procedure Component Value Units Date/Time    Blood culture x two cultures. Draw prior to antibiotics. [[id number]] Collected: 01/24/22 1413    Order Status: Sent Specimen: Blood Updated: 01/24/22 1414    Blood culture x two cultures. Draw prior to antibiotics. [[id number]] Collected: 01/24/22 1413    Order Status: Sent Specimen: Blood from Peripheral, Right Updated: " 01/24/22 1414    Urine culture [[id number]] Collected: 01/24/22 1310    Order Status: No result Specimen: Urine Updated: 01/24/22 1346       Will monitor patient closely and continue current treatment plan unchanged.        Deep tissue injury        Cirrhosis of liver  Patient with known Cirrhosis with Child's class A.   MELD-Na score; MELD-Na score: 7 at 1/24/2022  1:02 PM  MELD score: 7 at 1/24/2022  1:02 PM  Calculated from:  Serum Creatinine: 0.5 mg/dL (Using min of 1 mg/dL) at 1/24/2022  1:02 PM  Serum Sodium: 144 mmol/L (Using max of 137 mmol/L) at 1/24/2022  1:02 PM  Total Bilirubin: 0.4 mg/dL (Using min of 1 mg/dL) at 1/24/2022  1:02 PM  INR(ratio): 1.1 at 1/24/2022  1:02 PM  Age: 61 years    Continue chronic meds. Etiology likely Hepatitis. Will avoid any hepatotoxic meds, and monitor CMP/INR for synthetic function.         Pressure injury of left buttock, stage 4  Consult wound care      Difficult Hamilton catheter placement  Continue Hamilton      Gastroesophageal reflux disease  PPI indicated      Essential hypertension  Patient borderline hypotensive.  Hold antihypertensives at this point in time.      PEG (percutaneous endoscopic gastrostomy) status  Continue routine percutaneous gastrostomy care      Tracheostomy dependence  Continue tracheostomy care      Advance care planning  Patient with no quality of life whatsoever, being kept alive on machines.  Last advanced care planning conversation and May of last year.  At that point in time family peer to want aggressive measures.  Will readdress with family if they still want to continue with the patient and this state perpetually.  Her chance of meaningful recovery is essentially nil.       Persistent vegetative state  Patient with baseline GCS approximately 4-5.  Very poor long-term survival.  Family not present for conversation at this point in time.      Anemia of chronic disease  Patient's anemia is currently controlled. Has not received any PRBCs to  date..   Current CBC reviewed-   Lab Results   Component Value Date    HGB 8.4 (L) 01/24/2022    HCT 28.6 (L) 01/24/2022     Monitor serial CBC and transfuse if patient becomes hemodynamically unstable, symptomatic or H/H drops below 7/21.           VTE Risk Mitigation (From admission, onward)         Ordered     enoxaparin injection 40 mg  Daily         01/24/22 1808     IP VTE HIGH RISK PATIENT  Once         01/24/22 1808              Critical care time spent on the evaluation and treatment of severe organ dysfunction, review of pertinent labs and imaging studies, discussions with consulting providers and discussions with patient/family: 39 minutes.     Tolu Cross MD  Department of Hospital Medicine   [facility]

## 2022-01-25 NOTE — SUBJECTIVE & OBJECTIVE
Past Medical History:   Diagnosis Date    Anoxic brain damage 07/2020    Bedbound 07/2020    Cardiac arrest 07/2020    Cirrhosis     GERD (gastroesophageal reflux disease)     Hemangioma of intra-abdominal structure     Hypertension     Nursing home resident     Protein calorie malnutrition     Pulmonary embolus     Respiratory distress     S/P percutaneous endoscopic gastrostomy (PEG) tube placement 07/2020    Total self-care deficit 07/2020    Tracheostomy dependence     7/2020       Past Surgical History:   Procedure Laterality Date    PEG W/TRACHEOSTOMY PLACEMENT  07/27/2020    SKIN GRAFT      buttocks       Review of patient's allergies indicates:  No Known Allergies    No current facility-administered medications on file prior to encounter.     Current Outpatient Medications on File Prior to Encounter   Medication Sig    acetaminophen (TYLENOL) 325 MG tablet 2 tablets (650 mg total) by Per G Tube route every 4 (four) hours as needed for Pain or Temperature greater than (100.4F).    acetylcysteine 100 mg/ml, 10%, (MUCOMYST) 100 mg/mL (10 %) nebulizer solution Take 4 mLs by nebulization every 8 (eight) hours.    albuterol-ipratropium (DUO-NEB) 2.5 mg-0.5 mg/3 mL nebulizer solution Take 3 mLs by nebulization every 6 (six) hours. Rescue    artificial tears (ISOPTO TEARS) 0.5 % ophthalmic solution Place 1 drop into both eyes as needed.    clonazePAM (KLONOPIN) 0.5 MG tablet 1 tablet (0.5 mg total) by Per G Tube route 3 (three) times daily.    ergocalciferol (ERGOCALCIFEROL) 50,000 unit Cap Take 50,000 Units by mouth every 7 days.    ferrous sulfate 300 mg (60 mg iron)/5 mL syrup Take 300 mg by mouth As instructed. Every other day    meropenem-0.9% sodium chloride 500 mg/50 mL PgBk Inject 50 mLs (500 mg total) into the vein every 8 (eight) hours.    midodrine (PROAMATINE) 10 MG tablet 1 tablet (10 mg total) by Per G Tube route 3 (three) times daily.    polyethylene glycol (GLYCOLAX) 17 gram  PwPk Take by mouth daily as needed.    vancomycin HCl (VANCOMYCIN 1.25 G/250 ML D5W, READY TO MIX,) Inject 250 mLs (1,250 mg total) into the vein once daily. for 7 days     Family History     Problem Relation (Age of Onset)    No Known Problems Mother, Father        Tobacco Use    Smoking status: Never Smoker    Smokeless tobacco: Never Used   Substance and Sexual Activity    Alcohol use: Not Currently    Drug use: Not Currently    Sexual activity: Not Currently     Review of Systems   Unable to perform ROS: Patient unresponsive     Objective:     Vital Signs (Most Recent):  Temp: 96.5 °F (35.8 °C) (01/24/22 1815)  Pulse: 73 (01/24/22 1920)  Resp: (!) 24 (01/24/22 1920)  BP: (!) 140/68 (01/24/22 1920)  SpO2: 97 % (01/24/22 2151) Vital Signs (24h Range):  Temp:  [94.6 °F (34.8 °C)-96.5 °F (35.8 °C)] 96.5 °F (35.8 °C)  Pulse:  [65-78] 73  Resp:  [24-60] 24  SpO2:  [96 %-100 %] 97 %  BP: ()/(57-76) 140/68     Weight: 70 kg (154 lb 5.2 oz)  Body mass index is 30.14 kg/m².    Physical Exam  Vitals and nursing note reviewed.   Constitutional:       General: She is not in acute distress.     Appearance: She is ill-appearing. She is not diaphoretic.   HENT:      Mouth/Throat:      Pharynx: No oropharyngeal exudate.   Eyes:      General:         Right eye: No discharge.         Left eye: No discharge.      Comments: Pupils sluggish   Neck:      Thyroid: No thyromegaly.      Vascular: No JVD.      Trachea: No tracheal deviation.      Comments: tracheostomy in place  Cardiovascular:      Rate and Rhythm: Normal rate and regular rhythm.      Heart sounds: No murmur heard.  No friction rub. No gallop.    Pulmonary:      Effort: No respiratory distress.      Breath sounds: Wheezing and rhonchi present. No rales.      Comments: Breath sounds coarse on ventilator.  Abdominal:      General: There is no distension.      Palpations: Abdomen is soft.      Tenderness: There is no abdominal tenderness.      Comments:  Percutaneous gastrostomy in place  Cholecystostomy in place   Genitourinary:     Comments: Hamilton catheter noted in place.  Musculoskeletal:         General: No tenderness or deformity.   Skin:     Capillary Refill: Capillary refill takes 2 to 3 seconds.      Findings: No erythema or rash.   Neurological:      Mental Status: Mental status is at baseline. She is disoriented.      Motor: No abnormal muscle tone.      Deep Tendon Reflexes: Reflexes normal.      Comments: Patient sedated, unresponsive at present     Significant Labs: All pertinent labs within the past 24 hours have been reviewed.    Significant Imaging: I have reviewed all pertinent imaging results/findings within the past 24 hours.

## 2022-01-25 NOTE — ASSESSMENT & PLAN NOTE
"Patient with current diagnosis of pneumonia with concern for bacterial etiology due to  MRSA and Pseudomonas which is uncontrolled due to persistent hypoxia . I have reviewed the pertinent imaging. Current antimicrobial regimen consists of   Antibiotics (From admission, onward)            Start     Stop Route Frequency Ordered    01/24/22 1930  vancomycin 1.25 g in dextrose 5% 250 mL IVPB (ready to mix)         -- IV Every 12 hours (non-standard times) 01/24/22 1825 01/24/22 1815  meropenem (MERREM) 2 g in sodium chloride 0.9% 100 mL IVPB  ( Pneumonia - Moderate-High MDR )         01/29 1814 IV Every 8 hours (non-standard times) 01/24/22 1808 01/24/22 1620  vancomycin - pharmacy to dose  (vancomycin IVPB)        "And" Linked Group Details    -- IV pharmacy to manage frequency 01/24/22 1808      . Cultures drawn and noted-   Microbiology Results (last 7 days)     Procedure Component Value Units Date/Time    Blood culture x two cultures. Draw prior to antibiotics. [[id number]] Collected: 01/24/22 1413    Order Status: Sent Specimen: Blood Updated: 01/24/22 1414    Blood culture x two cultures. Draw prior to antibiotics. [[id number]] Collected: 01/24/22 1413    Order Status: Sent Specimen: Blood from Peripheral, Right Updated: 01/24/22 1414    Urine culture [[id number]] Collected: 01/24/22 1310    Order Status: No result Specimen: Urine Updated: 01/24/22 1346       Will monitor patient closely and continue current treatment plan unchanged.      "

## 2022-01-26 LAB
ALBUMIN SERPL BCP-MCNC: 2.5 G/DL (ref 3.5–5.2)
ALP SERPL-CCNC: 156 U/L (ref 55–135)
ALT SERPL W/O P-5'-P-CCNC: 34 U/L (ref 10–44)
ANION GAP SERPL CALC-SCNC: 11 MMOL/L (ref 8–16)
AST SERPL-CCNC: 44 U/L (ref 10–40)
BACTERIA UR CULT: NO GROWTH
BASOPHILS # BLD AUTO: 0.03 K/UL (ref 0–0.2)
BASOPHILS NFR BLD: 0.9 % (ref 0–1.9)
BILIRUB SERPL-MCNC: 0.5 MG/DL (ref 0.1–1)
BUN SERPL-MCNC: 12 MG/DL (ref 6–20)
CALCIUM SERPL-MCNC: 9.1 MG/DL (ref 8.7–10.5)
CHLORIDE SERPL-SCNC: 110 MMOL/L (ref 95–110)
CO2 SERPL-SCNC: 23 MMOL/L (ref 23–29)
CREAT SERPL-MCNC: 0.6 MG/DL (ref 0.5–1.4)
DIFFERENTIAL METHOD: ABNORMAL
EOSINOPHIL # BLD AUTO: 0.1 K/UL (ref 0–0.5)
EOSINOPHIL NFR BLD: 2.7 % (ref 0–8)
ERYTHROCYTE [DISTWIDTH] IN BLOOD BY AUTOMATED COUNT: 19.8 % (ref 11.5–14.5)
EST. GFR  (AFRICAN AMERICAN): >60 ML/MIN/1.73 M^2
EST. GFR  (NON AFRICAN AMERICAN): >60 ML/MIN/1.73 M^2
GLUCOSE SERPL-MCNC: 83 MG/DL (ref 70–110)
HCT VFR BLD AUTO: 26.9 % (ref 37–48.5)
HGB BLD-MCNC: 8.1 G/DL (ref 12–16)
IMM GRANULOCYTES # BLD AUTO: 0.01 K/UL (ref 0–0.04)
IMM GRANULOCYTES NFR BLD AUTO: 0.3 % (ref 0–0.5)
LACTATE SERPL-SCNC: 0.8 MMOL/L (ref 0.5–2.2)
LYMPHOCYTES # BLD AUTO: 1.1 K/UL (ref 1–4.8)
LYMPHOCYTES NFR BLD: 32.4 % (ref 18–48)
MAGNESIUM SERPL-MCNC: 1.9 MG/DL (ref 1.6–2.6)
MCH RBC QN AUTO: 27.6 PG (ref 27–31)
MCHC RBC AUTO-ENTMCNC: 30.1 G/DL (ref 32–36)
MCV RBC AUTO: 92 FL (ref 82–98)
MONOCYTES # BLD AUTO: 0.5 K/UL (ref 0.3–1)
MONOCYTES NFR BLD: 14.4 % (ref 4–15)
NEUTROPHILS # BLD AUTO: 1.6 K/UL (ref 1.8–7.7)
NEUTROPHILS NFR BLD: 49.3 % (ref 38–73)
NRBC BLD-RTO: 1 /100 WBC
PHOSPHATE SERPL-MCNC: 3.8 MG/DL (ref 2.7–4.5)
PLATELET # BLD AUTO: 427 K/UL (ref 150–450)
PMV BLD AUTO: 10.1 FL (ref 9.2–12.9)
POCT GLUCOSE: 103 MG/DL (ref 70–110)
POCT GLUCOSE: 79 MG/DL (ref 70–110)
POCT GLUCOSE: 91 MG/DL (ref 70–110)
POTASSIUM SERPL-SCNC: 4 MMOL/L (ref 3.5–5.1)
PROT SERPL-MCNC: 7.5 G/DL (ref 6–8.4)
RBC # BLD AUTO: 2.94 M/UL (ref 4–5.4)
SODIUM SERPL-SCNC: 144 MMOL/L (ref 136–145)
VANCOMYCIN TROUGH SERPL-MCNC: 22.5 UG/ML (ref 10–22)
WBC # BLD AUTO: 3.33 K/UL (ref 3.9–12.7)

## 2022-01-26 PROCEDURE — 63600175 PHARM REV CODE 636 W HCPCS: Performed by: HOSPITALIST

## 2022-01-26 PROCEDURE — C1729 CATH, DRAINAGE: HCPCS

## 2022-01-26 PROCEDURE — 25500020 PHARM REV CODE 255

## 2022-01-26 PROCEDURE — 83735 ASSAY OF MAGNESIUM: CPT | Performed by: HOSPITALIST

## 2022-01-26 PROCEDURE — 51702 INSERT TEMP BLADDER CATH: CPT

## 2022-01-26 PROCEDURE — 25000242 PHARM REV CODE 250 ALT 637 W/ HCPCS: Performed by: HOSPITALIST

## 2022-01-26 PROCEDURE — 99900035 HC TECH TIME PER 15 MIN (STAT)

## 2022-01-26 PROCEDURE — A9698 NON-RAD CONTRAST MATERIALNOC: HCPCS

## 2022-01-26 PROCEDURE — 80053 COMPREHEN METABOLIC PANEL: CPT | Performed by: HOSPITALIST

## 2022-01-26 PROCEDURE — 27000207 HC ISOLATION

## 2022-01-26 PROCEDURE — 85025 COMPLETE CBC W/AUTO DIFF WBC: CPT | Performed by: HOSPITALIST

## 2022-01-26 PROCEDURE — 94761 N-INVAS EAR/PLS OXIMETRY MLT: CPT

## 2022-01-26 PROCEDURE — 25000003 PHARM REV CODE 250: Performed by: HOSPITALIST

## 2022-01-26 PROCEDURE — 36415 COLL VENOUS BLD VENIPUNCTURE: CPT | Performed by: HOSPITALIST

## 2022-01-26 PROCEDURE — 20000000 HC ICU ROOM

## 2022-01-26 PROCEDURE — A4216 STERILE WATER/SALINE, 10 ML: HCPCS | Performed by: HOSPITALIST

## 2022-01-26 PROCEDURE — 99900026 HC AIRWAY MAINTENANCE (STAT)

## 2022-01-26 PROCEDURE — 27000221 HC OXYGEN, UP TO 24 HOURS

## 2022-01-26 PROCEDURE — 94640 AIRWAY INHALATION TREATMENT: CPT

## 2022-01-26 PROCEDURE — 80202 ASSAY OF VANCOMYCIN: CPT | Performed by: HOSPITALIST

## 2022-01-26 PROCEDURE — 83605 ASSAY OF LACTIC ACID: CPT | Performed by: HOSPITALIST

## 2022-01-26 PROCEDURE — 94003 VENT MGMT INPAT SUBQ DAY: CPT

## 2022-01-26 PROCEDURE — 84100 ASSAY OF PHOSPHORUS: CPT | Performed by: HOSPITALIST

## 2022-01-26 RX ORDER — VANCOMYCIN HCL IN 5 % DEXTROSE 1G/250ML
1000 PLASTIC BAG, INJECTION (ML) INTRAVENOUS
Status: DISCONTINUED | OUTPATIENT
Start: 2022-01-27 | End: 2022-01-27 | Stop reason: HOSPADM

## 2022-01-26 RX ORDER — SCOLOPAMINE TRANSDERMAL SYSTEM 1 MG/1
1 PATCH, EXTENDED RELEASE TRANSDERMAL
Status: DISCONTINUED | OUTPATIENT
Start: 2022-01-26 | End: 2022-01-27 | Stop reason: HOSPADM

## 2022-01-26 RX ADMIN — MUPIROCIN: 20 OINTMENT TOPICAL at 09:01

## 2022-01-26 RX ADMIN — CLONAZEPAM 0.5 MG: 0.5 TABLET ORAL at 09:01

## 2022-01-26 RX ADMIN — HYPROMELLOSE 2910 1 DROP: 5 SOLUTION OPHTHALMIC at 09:01

## 2022-01-26 RX ADMIN — MEROPENEM 2 G: 1 INJECTION, POWDER, FOR SOLUTION INTRAVENOUS at 09:01

## 2022-01-26 RX ADMIN — SENNOSIDES AND DOCUSATE SODIUM 1 TABLET: 8.6; 5 TABLET ORAL at 09:01

## 2022-01-26 RX ADMIN — IPRATROPIUM BROMIDE AND ALBUTEROL SULFATE 3 ML: 2.5; .5 SOLUTION RESPIRATORY (INHALATION) at 07:01

## 2022-01-26 RX ADMIN — SODIUM CHLORIDE 5 ML/HR: 0.9 INJECTION, SOLUTION INTRAVENOUS at 02:01

## 2022-01-26 RX ADMIN — ACETAMINOPHEN 1000 MG: 500 TABLET ORAL at 07:01

## 2022-01-26 RX ADMIN — MIDODRINE HYDROCHLORIDE 10 MG: 5 TABLET ORAL at 09:01

## 2022-01-26 RX ADMIN — MIDODRINE HYDROCHLORIDE 10 MG: 5 TABLET ORAL at 02:01

## 2022-01-26 RX ADMIN — PANTOPRAZOLE SODIUM 40 MG: 40 TABLET, DELAYED RELEASE ORAL at 09:01

## 2022-01-26 RX ADMIN — Medication 3 ML: at 05:01

## 2022-01-26 RX ADMIN — Medication 3 ML: at 02:01

## 2022-01-26 RX ADMIN — MEROPENEM 2 G: 1 INJECTION, POWDER, FOR SOLUTION INTRAVENOUS at 05:01

## 2022-01-26 RX ADMIN — ACETAMINOPHEN 1000 MG: 500 TABLET ORAL at 09:01

## 2022-01-26 RX ADMIN — MEROPENEM 2 G: 1 INJECTION, POWDER, FOR SOLUTION INTRAVENOUS at 02:01

## 2022-01-26 RX ADMIN — IPRATROPIUM BROMIDE AND ALBUTEROL SULFATE 3 ML: 2.5; .5 SOLUTION RESPIRATORY (INHALATION) at 12:01

## 2022-01-26 RX ADMIN — SCOPALAMINE 1 PATCH: 1 PATCH, EXTENDED RELEASE TRANSDERMAL at 12:01

## 2022-01-26 RX ADMIN — ENOXAPARIN SODIUM 40 MG: 100 INJECTION SUBCUTANEOUS at 05:01

## 2022-01-26 RX ADMIN — CLONAZEPAM 0.5 MG: 0.5 TABLET ORAL at 02:01

## 2022-01-26 RX ADMIN — IOHEXOL 500 ML: 9 SOLUTION ORAL at 12:01

## 2022-01-26 RX ADMIN — Medication 3 ML: at 09:01

## 2022-01-26 RX ADMIN — IOHEXOL 75 ML: 350 INJECTION, SOLUTION INTRAVENOUS at 02:01

## 2022-01-26 RX ADMIN — ACETAMINOPHEN 1000 MG: 500 TABLET ORAL at 12:01

## 2022-01-26 NOTE — PLAN OF CARE
Patient not medically clear, Ventilator dependent (Trach & Peg), CT ABD/Pel pending, cultures pending, at goal with tube feedings.  Discharge plan is return to [facility] when appropriated, patient is a resident at [facility].    CM will continue to follow.

## 2022-01-26 NOTE — PLAN OF CARE
Pt continues on current POC. R forearm midline Patent. Hamilton and RLQ Biliary drain patent. PT afebrile. Safety measures active, will continue to monitor.    Problem: Adult Inpatient Plan of Care  Goal: Plan of Care Review  Outcome: Ongoing, Progressing  Goal: Patient-Specific Goal (Individualized)  Outcome: Ongoing, Progressing  Goal: Absence of Hospital-Acquired Illness or Injury  Outcome: Ongoing, Progressing  Goal: Optimal Comfort and Wellbeing  Outcome: Ongoing, Progressing  Goal: Readiness for Transition of Care  Outcome: Ongoing, Progressing     Problem: Communication Impairment (Mechanical Ventilation, Invasive)  Goal: Effective Communication  Outcome: Ongoing, Progressing     Problem: Device-Related Complication Risk (Mechanical Ventilation, Invasive)  Goal: Optimal Device Function  Outcome: Ongoing, Progressing     Problem: Inability to Wean (Mechanical Ventilation, Invasive)  Goal: Mechanical Ventilation Liberation  Outcome: Ongoing, Progressing     Problem: Nutrition Impairment (Mechanical Ventilation, Invasive)  Goal: Optimal Nutrition Delivery  Outcome: Ongoing, Progressing     Problem: Skin and Tissue Injury (Mechanical Ventilation, Invasive)  Goal: Absence of Device-Related Skin and Tissue Injury  Outcome: Ongoing, Progressing     Problem: Ventilator-Induced Lung Injury (Mechanical Ventilation, Invasive)  Goal: Absence of Ventilator-Induced Lung Injury  Outcome: Ongoing, Progressing     Problem: Communication Impairment (Artificial Airway)  Goal: Effective Communication  Outcome: Ongoing, Progressing     Problem: Device-Related Complication Risk (Artificial Airway)  Goal: Optimal Device Function  Outcome: Ongoing, Progressing     Problem: Skin and Tissue Injury (Artificial Airway)  Goal: Absence of Device-Related Skin or Tissue Injury  Outcome: Ongoing, Progressing     Problem: Noninvasive Ventilation Acute  Goal: Effective Unassisted Ventilation and Oxygenation  Outcome: Ongoing, Progressing      Problem: Infection  Goal: Absence of Infection Signs and Symptoms  Outcome: Ongoing, Progressing     Problem: Adjustment to Illness (Sepsis/Septic Shock)  Goal: Optimal Coping  Outcome: Ongoing, Progressing     Problem: Bleeding (Sepsis/Septic Shock)  Goal: Absence of Bleeding  Outcome: Ongoing, Progressing     Problem: Glycemic Control Impaired (Sepsis/Septic Shock)  Goal: Blood Glucose Level Within Desired Range  Outcome: Ongoing, Progressing     Problem: Infection Progression (Sepsis/Septic Shock)  Goal: Absence of Infection Signs and Symptoms  Outcome: Ongoing, Progressing     Problem: Nutrition Impaired (Sepsis/Septic Shock)  Goal: Optimal Nutrition Intake  Outcome: Ongoing, Progressing     Problem: Impaired Wound Healing  Goal: Optimal Wound Healing  Outcome: Ongoing, Progressing     Problem: Skin Injury Risk Increased  Goal: Skin Health and Integrity  Outcome: Ongoing, Progressing     Problem: Fall Injury Risk  Goal: Absence of Fall and Fall-Related Injury  Outcome: Ongoing, Progressing

## 2022-01-26 NOTE — ASSESSMENT & PLAN NOTE
"This patient does have evidence of infective focus  My overall impression is sepsis. Vital signs were reviewed and noted in progress note.  Antibiotics given-   Antibiotics (From admission, onward)            Start     Stop Route Frequency Ordered    01/25/22 1930  vancomycin 1.25 g in dextrose 5% 250 mL IVPB (ready to mix)         -- IV Every 24 hours (non-standard times) 01/24/22 2152 01/24/22 2230  mupirocin 2 % ointment         01/29 2059 Nasl 2 times daily 01/24/22 2122 01/24/22 1815  meropenem (MERREM) 2 g in sodium chloride 0.9% 100 mL IVPB  ( Pneumonia - Moderate-High MDR )         01/29 1814 IV Every 8 hours (non-standard times) 01/24/22 1808 01/24/22 1620  vancomycin - pharmacy to dose  (vancomycin IVPB)        "And" Linked Group Details    -- IV pharmacy to manage frequency 01/24/22 1808        Cultures were taken-   Microbiology Results (last 7 days)     Procedure Component Value Units Date/Time    Urine culture [[id number]] Collected: 01/24/22 1310    Order Status: Completed Specimen: Urine Updated: 01/26/22 0935     Urine Culture, Routine No growth    Narrative:      Specimen Source->Urine    Blood culture x two cultures. Draw prior to antibiotics. [[id number]] Collected: 01/24/22 1413    Order Status: Completed Specimen: Blood from Peripheral, Right Updated: 01/25/22 2312     Blood Culture, Routine No Growth to date      No Growth to date    Narrative:      Aerobic and anaerobic    Blood culture x two cultures. Draw prior to antibiotics. [[id number]] Collected: 01/24/22 1413    Order Status: Completed Specimen: Blood Updated: 01/25/22 2312     Blood Culture, Routine No Growth to date      No Growth to date    Narrative:      Aerobic and anaerobic        Latest lactate reviewed, they are-  Recent Labs   Lab 01/24/22  1647 01/25/22  0359 01/26/22  0354   LACTATE 1.0 1.0 0.8       Organ dysfunction indicated by Encephalopathy   Source- pneumonia versus abdominal    Check CT scan in next 1-2 days if " patient becomes more clinically stable.

## 2022-01-26 NOTE — PROGRESS NOTES
[facility] Hospital Medicine  Progress Note    Patient Name: Genna Felix  MRN: [id number]  Patient Class: IP- Inpatient   Admission Date: 1/24/2022  Length of Stay: 1 days  Attending Physician: Tolu Cross MD  Primary Care Provider: Primary Doctor No        Subjective:     Principal Problem:Severe sepsis        HPI:  Patient is a 41-year-old  female with a past medical history significant for anoxic brain injury secondary to cardiac arrest and secondary persistent vegetative state who has chronic PEG/trach status with baseline GCS of approximately 4 who presents to the hospital after recent cholecystostomy tube placement for what appears to be sepsis.  Patient is nonverbal and unable to provide any further history.  Of the patient's outside facility, the patient was noted to be hypothermic.  Patient received vancomycin on the morning of admission, and was transition to the emergency department after hypothermia was noted.  No further history is unable to be elucidated given the patient's nonverbal status.      Overview/Hospital Course:  No notes on file    Interval History:  Patient seen and examined.  Remains nonverbal at baseline.  Continues to be hypothermic throughout the day.  Plan of care reviewed with the nurse at the bedside in detail.    Review of Systems   Unable to perform ROS: Patient unresponsive     Objective:     Vital Signs (Most Recent):  Temp: 98.8 °F (37.1 °C) (01/25/22 2036)  Pulse: 100 (01/25/22 1904)  Resp: (!) 33 (01/25/22 1904)  BP: 106/66 (01/25/22 1900)  SpO2: 100 % (01/25/22 1904) Vital Signs (24h Range):  Temp:  [95.4 °F (35.2 °C)-98.8 °F (37.1 °C)] 98.8 °F (37.1 °C)  Pulse:  [] 100  Resp:  [20-37] 33  SpO2:  [95 %-100 %] 100 %  BP: (103-147)/(57-91) 106/66     Weight: 71.8 kg (158 lb 4.6 oz)  Body mass index is 30.91 kg/m².    Intake/Output Summary (Last 24 hours) at 1/25/2022 2221  Last data filed at 1/25/2022 2000  Gross per 24 hour   Intake  587 ml   Output 1410 ml   Net -823 ml      Physical Exam  Vitals and nursing note reviewed.   Constitutional:       General: She is not in acute distress.     Appearance: She is ill-appearing. She is not diaphoretic.   HENT:      Mouth/Throat:      Pharynx: No oropharyngeal exudate.   Eyes:      General:         Right eye: No discharge.         Left eye: No discharge.      Comments: Pupils sluggish   Neck:      Thyroid: No thyromegaly.      Vascular: No JVD.      Trachea: No tracheal deviation.      Comments: tracheostomy in place  Cardiovascular:      Rate and Rhythm: Normal rate and regular rhythm.      Heart sounds: No murmur heard.  No friction rub. No gallop.    Pulmonary:      Effort: No respiratory distress.      Breath sounds: Wheezing and rhonchi present. No rales.      Comments: Breath sounds coarse on ventilator.  Abdominal:      General: There is no distension.      Palpations: Abdomen is soft.      Tenderness: There is no abdominal tenderness.      Comments: Percutaneous gastrostomy in place  Cholecystostomy in place   Genitourinary:     Comments: Hamilton catheter noted in place.  Musculoskeletal:         General: No tenderness or deformity.   Skin:     Capillary Refill: Capillary refill takes 2 to 3 seconds.      Findings: No erythema or rash.   Neurological:      Mental Status: Mental status is at baseline. She is disoriented.      Motor: No abnormal muscle tone.      Deep Tendon Reflexes: Reflexes normal.      Comments: Patient sedated, unresponsive at present         Significant Labs: All pertinent labs within the past 24 hours have been reviewed.    Significant Imaging: I have reviewed all pertinent imaging results/findings within the past 24 hours.      Assessment/Plan:      * Severe sepsis  This patient does have evidence of infective focus  My overall impression is sepsis. Vital signs were reviewed and noted in progress note.  Antibiotics given-   Antibiotics (From admission, onward)             "Start     Stop Route Frequency Ordered    01/25/22 1930  vancomycin 1.25 g in dextrose 5% 250 mL IVPB (ready to mix)         -- IV Every 24 hours (non-standard times) 01/24/22 2152 01/24/22 2230  mupirocin 2 % ointment         01/29 2059 Nasl 2 times daily 01/24/22 2122 01/24/22 1815  meropenem (MERREM) 2 g in sodium chloride 0.9% 100 mL IVPB  ( Pneumonia - Moderate-High MDR )         01/29 1814 IV Every 8 hours (non-standard times) 01/24/22 1808 01/24/22 1620  vancomycin - pharmacy to dose  (vancomycin IVPB)        "And" Linked Group Details    -- IV pharmacy to manage frequency 01/24/22 1808        Cultures were taken-   Microbiology Results (last 7 days)     Procedure Component Value Units Date/Time    Blood culture x two cultures. Draw prior to antibiotics. [[id number]] Collected: 01/24/22 1413    Order Status: Completed Specimen: Blood from Peripheral, Right Updated: 01/25/22 0515     Blood Culture, Routine No Growth to date    Narrative:      Aerobic and anaerobic    Blood culture x two cultures. Draw prior to antibiotics. [[id number]] Collected: 01/24/22 1413    Order Status: Completed Specimen: Blood Updated: 01/25/22 0515     Blood Culture, Routine No Growth to date    Narrative:      Aerobic and anaerobic    Urine culture [[id number]] Collected: 01/24/22 1310    Order Status: No result Specimen: Urine Updated: 01/24/22 1346        Latest lactate reviewed, they are-  Recent Labs   Lab 01/24/22  1302 01/24/22  1647 01/25/22  0359   LACTATE 0.9 1.0 1.0       Organ dysfunction indicated by Encephalopathy   Source- pneumonia versus abdominal    Check CT scan in next 1-2 days if patient becomes more clinically stable.      Acute on chronic respiratory failure with hypoxia  Patient with Hypoxic Respiratory failure which is Chronic.  she is chronically on a ventilator.   Supplemental oxygen was provided and noted- Vent Mode: A/CMV-VC  Oxygen Concentration (%):  [] 30  Resp Rate Total:  [24 br/min-55 " "br/min] 33 br/min  Vt Set:  [350 mL] 350 mL  PEEP/CPAP:  [4.8 cmH20-6.1 cmH20] 6.1 cmH20  Mean Airway Pressure:  [11.2 ydP23-06.5 cmH20] 11.2 cmH20.   Signs/symptoms of respiratory failure include- increased work of breathing and respiratory distress. Contributing diagnoses includes - Pleural effusion and Pneumonia Labs and images were reviewed. Patient Has recent ABG, which has been reviewed. Will treat underlying causes and adjust management of respiratory failure as follows- continue treatment for chronic respiratory failure.    Ventilator associated pneumonia  Patient with current diagnosis of pneumonia with concern for bacterial etiology due to  MRSA and Pseudomonas which is uncontrolled due to persistent hypoxia . I have reviewed the pertinent imaging. Current antimicrobial regimen consists of   Antibiotics (From admission, onward)            Start     Stop Route Frequency Ordered    01/25/22 1930  vancomycin 1.25 g in dextrose 5% 250 mL IVPB (ready to mix)         -- IV Every 24 hours (non-standard times) 01/24/22 2152 01/24/22 2230  mupirocin 2 % ointment         01/29 2059 Nasl 2 times daily 01/24/22 2122 01/24/22 1815  meropenem (MERREM) 2 g in sodium chloride 0.9% 100 mL IVPB  ( Pneumonia - Moderate-High MDR )         01/29 1814 IV Every 8 hours (non-standard times) 01/24/22 1808 01/24/22 1620  vancomycin - pharmacy to dose  (vancomycin IVPB)        "And" Linked Group Details    -- IV pharmacy to manage frequency 01/24/22 1808      . Cultures drawn and noted-   Microbiology Results (last 7 days)     Procedure Component Value Units Date/Time    Blood culture x two cultures. Draw prior to antibiotics. [[id number]] Collected: 01/24/22 1413    Order Status: Completed Specimen: Blood from Peripheral, Right Updated: 01/25/22 0515     Blood Culture, Routine No Growth to date    Narrative:      Aerobic and anaerobic    Blood culture x two cultures. Draw prior to antibiotics. [[id number]] Collected: " 01/24/22 1413    Order Status: Completed Specimen: Blood Updated: 01/25/22 0515     Blood Culture, Routine No Growth to date    Narrative:      Aerobic and anaerobic    Urine culture [[id number]] Collected: 01/24/22 1310    Order Status: No result Specimen: Urine Updated: 01/24/22 1346       Will monitor patient closely and continue current treatment plan unchanged.        Deep tissue injury        Cirrhosis of liver  Patient with known Cirrhosis with Child's class A.   MELD-Na score; MELD-Na score: 7 at 1/25/2022  3:59 AM  MELD score: 7 at 1/25/2022  3:59 AM  Calculated from:  Serum Creatinine: 0.5 mg/dL (Using min of 1 mg/dL) at 1/25/2022  3:59 AM  Serum Sodium: 142 mmol/L (Using max of 137 mmol/L) at 1/25/2022  3:59 AM  Total Bilirubin: 0.5 mg/dL (Using min of 1 mg/dL) at 1/25/2022  3:59 AM  INR(ratio): 1.1 at 1/24/2022  1:02 PM  Age: 61 years    Continue chronic meds. Etiology likely Hepatitis. Will avoid any hepatotoxic meds, and monitor CMP/INR for synthetic function.         Pressure injury of left buttock, stage 4  Consult wound care      Difficult Hamilton catheter placement  Continue Hamilton      Gastroesophageal reflux disease  PPI indicated      Essential hypertension  Patient borderline hypotensive.  Hold antihypertensives at this point in time.      PEG (percutaneous endoscopic gastrostomy) status  Continue routine percutaneous gastrostomy care      Tracheostomy dependence  Continue tracheostomy care      Advance care planning  Patient with no quality of life whatsoever, being kept alive on machines.  Last advanced care planning conversation and May of last year.  At that point in time family peer to want aggressive measures.  Will readdress with family if they still want to continue with the patient and this state perpetually.  Her chance of meaningful recovery is essentially nil.       Persistent vegetative state  Patient with baseline GCS approximately 4-5.  Very poor long-term survival.  Family not  present for conversation at this point in time.      Anemia of chronic disease  Patient's anemia is currently controlled. Has not received any PRBCs to date..   Current CBC reviewed-   Lab Results   Component Value Date    HGB 8.3 (L) 01/25/2022    HCT 28.9 (L) 01/25/2022     Monitor serial CBC and transfuse if patient becomes hemodynamically unstable, symptomatic or H/H drops below 7/21.           VTE Risk Mitigation (From admission, onward)         Ordered     enoxaparin injection 40 mg  Daily         01/24/22 1808     IP VTE HIGH RISK PATIENT  Once         01/24/22 1808                Discharge Planning   [place]:      Code Status: Full Code   Is the patient medically ready for discharge?:     Reason for patient still in hospital (select all that apply): Treatment  Discharge Plan A: Return to nursing home            Critical care time spent on the evaluation and treatment of severe organ dysfunction, review of pertinent labs and imaging studies, discussions with consulting providers and discussions with patient/family: 35 minutes.      Tolu Cross MD  Department of Hospital Medicine   [facility]

## 2022-01-26 NOTE — PLAN OF CARE
Pt continues on current POC. LOLLY midline dislodged during dressing change after notices drainage on pillow. R wrist peripheral IV patent. Day shift to f/u for possible midline replacement. Hamilton cath patent w dark tiffanie urine. Small amount of bright green drainage from biliary drain. Pt tolerating tube feeding, currently infusing at 40ml/hr, goal rate of 50 ml/hr. Safety measures active, will continue to monitor.    Problem: Adult Inpatient Plan of Care  Goal: Plan of Care Review  1/26/2022 0635 by Dasia Montano RN  Outcome: Ongoing, Progressing  1/26/2022 0220 by Dasia Montano RN  Outcome: Ongoing, Progressing  Goal: Patient-Specific Goal (Individualized)  1/26/2022 0635 by Dasia Montano RN  Outcome: Ongoing, Progressing  1/26/2022 0220 by Dasia Montano RN  Outcome: Ongoing, Progressing  Goal: Absence of Hospital-Acquired Illness or Injury  1/26/2022 0635 by Dasia Montano RN  Outcome: Ongoing, Progressing  1/26/2022 0220 by Dasia Montano RN  Outcome: Ongoing, Progressing  Goal: Optimal Comfort and Wellbeing  1/26/2022 0635 by Dasia Montano RN  Outcome: Ongoing, Progressing  1/26/2022 0220 by Dasia Montano RN  Outcome: Ongoing, Progressing  Goal: Readiness for Transition of Care  1/26/2022 0635 by Dasia Montano RN  Outcome: Ongoing, Progressing  1/26/2022 0220 by Dasia Montano RN  Outcome: Ongoing, Progressing     Problem: Communication Impairment (Mechanical Ventilation, Invasive)  Goal: Effective Communication  1/26/2022 0635 by Dasia Montano RN  Outcome: Ongoing, Progressing  1/26/2022 0220 by Dasia Montano RN  Outcome: Ongoing, Progressing     Problem: Device-Related Complication Risk (Mechanical Ventilation, Invasive)  Goal: Optimal Device Function  1/26/2022 0635 by Dasia Montano RN  Outcome: Ongoing, Progressing  1/26/2022 0220 by Dasia Montano RN  Outcome: Ongoing, Progressing     Problem: Inability to Wean (Mechanical Ventilation, Invasive)  Goal: Mechanical Ventilation  Liberation  1/26/2022 0635 by Dasia Montano RN  Outcome: Ongoing, Progressing  1/26/2022 0220 by Dasia Montano RN  Outcome: Ongoing, Progressing     Problem: Nutrition Impairment (Mechanical Ventilation, Invasive)  Goal: Optimal Nutrition Delivery  1/26/2022 0635 by Dasia Montano RN  Outcome: Ongoing, Progressing  1/26/2022 0220 by Dasia Montano RN  Outcome: Ongoing, Progressing     Problem: Skin and Tissue Injury (Mechanical Ventilation, Invasive)  Goal: Absence of Device-Related Skin and Tissue Injury  1/26/2022 0635 by Dasia Montano RN  Outcome: Ongoing, Progressing  1/26/2022 0220 by Dasia Montano RN  Outcome: Ongoing, Progressing     Problem: Ventilator-Induced Lung Injury (Mechanical Ventilation, Invasive)  Goal: Absence of Ventilator-Induced Lung Injury  1/26/2022 0635 by Dasia Montano RN  Outcome: Ongoing, Progressing  1/26/2022 0220 by Dasia Montano RN  Outcome: Ongoing, Progressing     Problem: Communication Impairment (Artificial Airway)  Goal: Effective Communication  1/26/2022 0635 by Dasia Montano RN  Outcome: Ongoing, Progressing  1/26/2022 0220 by Dasia Montano RN  Outcome: Ongoing, Progressing     Problem: Device-Related Complication Risk (Artificial Airway)  Goal: Optimal Device Function  1/26/2022 0635 by Dasia Montano RN  Outcome: Ongoing, Progressing  1/26/2022 0220 by Dasia Montano RN  Outcome: Ongoing, Progressing     Problem: Skin and Tissue Injury (Artificial Airway)  Goal: Absence of Device-Related Skin or Tissue Injury  1/26/2022 0635 by Dasia Montano RN  Outcome: Ongoing, Progressing  1/26/2022 0220 by Dasia Montano RN  Outcome: Ongoing, Progressing     Problem: Noninvasive Ventilation Acute  Goal: Effective Unassisted Ventilation and Oxygenation  1/26/2022 0635 by Dasia Montano RN  Outcome: Ongoing, Progressing  1/26/2022 0220 by Dasia Montano RN  Outcome: Ongoing, Progressing     Problem: Infection  Goal: Absence of Infection Signs and Symptoms  1/26/2022  0635 by Dasia Montano RN  Outcome: Ongoing, Progressing  1/26/2022 0220 by Dasia Montano RN  Outcome: Ongoing, Progressing     Problem: Adjustment to Illness (Sepsis/Septic Shock)  Goal: Optimal Coping  1/26/2022 0635 by Dasia Montano RN  Outcome: Ongoing, Progressing  1/26/2022 0220 by Dasia Montano RN  Outcome: Ongoing, Progressing     Problem: Bleeding (Sepsis/Septic Shock)  Goal: Absence of Bleeding  1/26/2022 0635 by Dasia Montano RN  Outcome: Ongoing, Progressing  1/26/2022 0220 by Dasia Montano RN  Outcome: Ongoing, Progressing     Problem: Glycemic Control Impaired (Sepsis/Septic Shock)  Goal: Blood Glucose Level Within Desired Range  1/26/2022 0635 by Dasia Montano RN  Outcome: Ongoing, Progressing  1/26/2022 0220 by Dasia Montano RN  Outcome: Ongoing, Progressing     Problem: Infection Progression (Sepsis/Septic Shock)  Goal: Absence of Infection Signs and Symptoms  1/26/2022 0635 by Dasia Montano RN  Outcome: Ongoing, Progressing  1/26/2022 0220 by Dasia Montano RN  Outcome: Ongoing, Progressing     Problem: Nutrition Impaired (Sepsis/Septic Shock)  Goal: Optimal Nutrition Intake  1/26/2022 0635 by Dasia Montano RN  Outcome: Ongoing, Progressing  1/26/2022 0220 by Dasia Montano RN  Outcome: Ongoing, Progressing     Problem: Impaired Wound Healing  Goal: Optimal Wound Healing  1/26/2022 0635 by Dasia Montano RN  Outcome: Ongoing, Progressing  1/26/2022 0220 by Dasia Montano RN  Outcome: Ongoing, Progressing     Problem: Skin Injury Risk Increased  Goal: Skin Health and Integrity  1/26/2022 0635 by Dasia Montano RN  Outcome: Ongoing, Progressing  1/26/2022 0220 by Dasia Montano RN  Outcome: Ongoing, Progressing     Problem: Fall Injury Risk  Goal: Absence of Fall and Fall-Related Injury  1/26/2022 0635 by Dasia Montano RN  Outcome: Ongoing, Progressing  1/26/2022 0220 by Dasia Montano RN  Outcome: Ongoing, Progressing

## 2022-01-26 NOTE — PLAN OF CARE
Intervention: enteral nutrition therapy, collaboration with care providers      Recommendation:  1) Change TF to  Nutren 1.5 @ 20 ml/hr advancing to goal of 50 ml/hr + 110 ml flush q 4 hr + Benny BID and beneprotein BID  (provides 1800 kcal ( > 100% EEN), 81 g protein + bene ( 89% EPN) , and 912 ml free water)   - If hemodynamically stable can resume home TF    Isosource 1.5 @ 50 ml/hr + 110 ml flush q 4 hr + beneprotein BID +Benny BID   ( provides 1800 kcal ( > 100% EEN), 81 g protein + bene ( 89% EPN), 912 ml free water)      2) If pt has trouble tolerating TF consider PPN  D 5 AA 4.25 @ 75 ml/hr + standard lipids ( 1112 kcal, 76 g protein)     3) weigh weekly, replete lytes as needed     Goals: 1) TF meeting goal at f/u  Nutrition Goal Status: new  Communication of JEFFERY Recs: POC, sticky note, reviewed with MD   Attempted to obtain health maintenance information, patient unable to provide answers for the following items Tdap and HepC screening.

## 2022-01-26 NOTE — ASSESSMENT & PLAN NOTE
Patient with known Cirrhosis with Child's class A.   MELD-Na score; MELD-Na score: 7 at 1/25/2022  3:59 AM  MELD score: 7 at 1/25/2022  3:59 AM  Calculated from:  Serum Creatinine: 0.5 mg/dL (Using min of 1 mg/dL) at 1/25/2022  3:59 AM  Serum Sodium: 142 mmol/L (Using max of 137 mmol/L) at 1/25/2022  3:59 AM  Total Bilirubin: 0.5 mg/dL (Using min of 1 mg/dL) at 1/25/2022  3:59 AM  INR(ratio): 1.1 at 1/24/2022  1:02 PM  Age: 61 years    Continue chronic meds. Etiology likely Hepatitis. Will avoid any hepatotoxic meds, and monitor CMP/INR for synthetic function.

## 2022-01-26 NOTE — PLAN OF CARE
POC reviewed with patient. No evidence of understanding. Trach to vent with ordered settings. Scopalamine ordered for secretions. Sinus rhythm. Continued tube feeds at goal rate. Tolerating well. Water flushes added to regimen. CT of abdomen completed today. Tylenol prn pain. Biliary tube to gravity bag - yellowish brown output. Hamilton to gravity. Safety measures in place and bed alarm in use.     Problem: Adult Inpatient Plan of Care  Goal: Plan of Care Review  Outcome: Ongoing, Progressing     Problem: Adult Inpatient Plan of Care  Goal: Optimal Comfort and Wellbeing  Outcome: Ongoing, Progressing     Problem: Communication Impairment (Mechanical Ventilation, Invasive)  Goal: Effective Communication  Outcome: Ongoing, Progressing     Problem: Inability to Wean (Mechanical Ventilation, Invasive)  Goal: Mechanical Ventilation Liberation  Outcome: Ongoing, Progressing     Problem: Nutrition Impairment (Mechanical Ventilation, Invasive)  Goal: Optimal Nutrition Delivery  Outcome: Ongoing, Progressing     Problem: Infection  Goal: Absence of Infection Signs and Symptoms  Outcome: Ongoing, Progressing     Problem: Glycemic Control Impaired (Sepsis/Septic Shock)  Goal: Blood Glucose Level Within Desired Range  Outcome: Ongoing, Progressing

## 2022-01-26 NOTE — ASSESSMENT & PLAN NOTE
Patient's anemia is currently controlled. Has not received any PRBCs to date..   Current CBC reviewed-   Lab Results   Component Value Date    HGB 8.3 (L) 01/25/2022    HCT 28.9 (L) 01/25/2022     Monitor serial CBC and transfuse if patient becomes hemodynamically unstable, symptomatic or H/H drops below 7/21.

## 2022-01-26 NOTE — ASSESSMENT & PLAN NOTE
"This patient does have evidence of infective focus  My overall impression is sepsis. Vital signs were reviewed and noted in progress note.  Antibiotics given-   Antibiotics (From admission, onward)            Start     Stop Route Frequency Ordered    01/25/22 1930  vancomycin 1.25 g in dextrose 5% 250 mL IVPB (ready to mix)         -- IV Every 24 hours (non-standard times) 01/24/22 2152 01/24/22 2230  mupirocin 2 % ointment         01/29 2059 Nasl 2 times daily 01/24/22 2122 01/24/22 1815  meropenem (MERREM) 2 g in sodium chloride 0.9% 100 mL IVPB  ( Pneumonia - Moderate-High MDR )         01/29 1814 IV Every 8 hours (non-standard times) 01/24/22 1808 01/24/22 1620  vancomycin - pharmacy to dose  (vancomycin IVPB)        "And" Linked Group Details    -- IV pharmacy to manage frequency 01/24/22 1808        Cultures were taken-   Microbiology Results (last 7 days)     Procedure Component Value Units Date/Time    Blood culture x two cultures. Draw prior to antibiotics. [[id number]] Collected: 01/24/22 1413    Order Status: Completed Specimen: Blood from Peripheral, Right Updated: 01/25/22 0515     Blood Culture, Routine No Growth to date    Narrative:      Aerobic and anaerobic    Blood culture x two cultures. Draw prior to antibiotics. [[id number]] Collected: 01/24/22 1413    Order Status: Completed Specimen: Blood Updated: 01/25/22 0515     Blood Culture, Routine No Growth to date    Narrative:      Aerobic and anaerobic    Urine culture [[id number]] Collected: 01/24/22 1310    Order Status: No result Specimen: Urine Updated: 01/24/22 1346        Latest lactate reviewed, they are-  Recent Labs   Lab 01/24/22  1302 01/24/22  1647 01/25/22  0359   LACTATE 0.9 1.0 1.0       Organ dysfunction indicated by Encephalopathy   Source- pneumonia versus abdominal    Check CT scan in next 1-2 days if patient becomes more clinically stable.    "

## 2022-01-26 NOTE — ASSESSMENT & PLAN NOTE
Patient with Hypoxic Respiratory failure which is Chronic.  she is chronically on a ventilator.   Supplemental oxygen was provided and noted- Vent Mode: A/CMV-VC  Oxygen Concentration (%):  [] 30  Resp Rate Total:  [24 br/min-55 br/min] 33 br/min  Vt Set:  [350 mL] 350 mL  PEEP/CPAP:  [4.8 cmH20-6.1 cmH20] 6.1 cmH20  Mean Airway Pressure:  [11.2 wjD85-30.5 cmH20] 11.2 cmH20.   Signs/symptoms of respiratory failure include- increased work of breathing and respiratory distress. Contributing diagnoses includes - Pleural effusion and Pneumonia Labs and images were reviewed. Patient Has recent ABG, which has been reviewed. Will treat underlying causes and adjust management of respiratory failure as follows- continue treatment for chronic respiratory failure.

## 2022-01-26 NOTE — PLAN OF CARE
Per Justine with [facility] [phone]- the pt was at their [place] location for IV abx and she called for an update on the pt. She is able to accept the pt for return once medically cleared. CM following.    01/26/22 1602   Discharge Assessment   Assessment Type Discharge Planning Reassessment

## 2022-01-26 NOTE — SUBJECTIVE & OBJECTIVE
Interval History:  Patient seen and examined.  Remains nonverbal at baseline.  Continues to be hypothermic throughout the day.  Plan of care reviewed with the nurse at the bedside in detail.    Review of Systems   Unable to perform ROS: Patient unresponsive     Objective:     Vital Signs (Most Recent):  Temp: 98.8 °F (37.1 °C) (01/25/22 2036)  Pulse: 100 (01/25/22 1904)  Resp: (!) 33 (01/25/22 1904)  BP: 106/66 (01/25/22 1900)  SpO2: 100 % (01/25/22 1904) Vital Signs (24h Range):  Temp:  [95.4 °F (35.2 °C)-98.8 °F (37.1 °C)] 98.8 °F (37.1 °C)  Pulse:  [] 100  Resp:  [20-37] 33  SpO2:  [95 %-100 %] 100 %  BP: (103-147)/(57-91) 106/66     Weight: 71.8 kg (158 lb 4.6 oz)  Body mass index is 30.91 kg/m².    Intake/Output Summary (Last 24 hours) at 1/25/2022 2221  Last data filed at 1/25/2022 2000  Gross per 24 hour   Intake 587 ml   Output 1410 ml   Net -823 ml      Physical Exam  Vitals and nursing note reviewed.   Constitutional:       General: She is not in acute distress.     Appearance: She is ill-appearing. She is not diaphoretic.   HENT:      Mouth/Throat:      Pharynx: No oropharyngeal exudate.   Eyes:      General:         Right eye: No discharge.         Left eye: No discharge.      Comments: Pupils sluggish   Neck:      Thyroid: No thyromegaly.      Vascular: No JVD.      Trachea: No tracheal deviation.      Comments: tracheostomy in place  Cardiovascular:      Rate and Rhythm: Normal rate and regular rhythm.      Heart sounds: No murmur heard.  No friction rub. No gallop.    Pulmonary:      Effort: No respiratory distress.      Breath sounds: Wheezing and rhonchi present. No rales.      Comments: Breath sounds coarse on ventilator.  Abdominal:      General: There is no distension.      Palpations: Abdomen is soft.      Tenderness: There is no abdominal tenderness.      Comments: Percutaneous gastrostomy in place  Cholecystostomy in place   Genitourinary:     Comments: Hamilton catheter noted in  place.  Musculoskeletal:         General: No tenderness or deformity.   Skin:     Capillary Refill: Capillary refill takes 2 to 3 seconds.      Findings: No erythema or rash.   Neurological:      Mental Status: Mental status is at baseline. She is disoriented.      Motor: No abnormal muscle tone.      Deep Tendon Reflexes: Reflexes normal.      Comments: Patient sedated, unresponsive at present         Significant Labs: All pertinent labs within the past 24 hours have been reviewed.    Significant Imaging: I have reviewed all pertinent imaging results/findings within the past 24 hours.

## 2022-01-26 NOTE — PROGRESS NOTES
[facility]  Adult Nutrition  Progress Note    SUMMARY     Intervention: enteral nutrition therapy, collaboration with care providers      Recommendation:  1) Change TF to  Nutren 1.5 @ 20 ml/hr advancing to goal of 50 ml/hr + 110 ml flush q 4 hr + Benny BID and beneprotein BID  (provides 1800 kcal ( > 100% EEN), 81 g protein + bene ( 89% EPN) , and 912 ml free water)   - If hemodynamically stable can resume home TF    Isosource 1.5 @ 50 ml/hr + 110 ml flush q 4 hr + beneprotein BID +Benny BID   ( provides 1800 kcal ( > 100% EEN), 81 g protein + bene ( 89% EPN), 912 ml free water)      2) If pt has trouble tolerating TF consider PPN  D 5 AA 4.25 @ 75 ml/hr + standard lipids ( 1112 kcal, 76 g protein)     3) weigh weekly, replete lytes as needed     Goals: 1) TF meeting goal at f/u  Nutrition Goal Status: new  Communication of RD Recs: POC, sticky note, reviewed with MD     Assessment and Plan     Inadequate energy/protein intake  R/t NPO, trouble swallowing  As evidenced by inadequate TF infusion x 1-2 days, stage 4 coccyx wound   Intervention: above  resolving     Malnutrition Assessment  Skin (Micronutrient):  (Hardik = 11 , stage 4 coccyx wound, elbow wound)  Teeth (Micronutrient): missing some  Micronutrient Evaluation: suspected deficiency  Micronutrient Evaluation Comments: check iron              Edema (Fluid Accumulation): 2+  Subcutaneous Fat Loss (Final Summary): well nourished  Muscle Loss Evaluation (Final Summary): well nourished       Reason for Assessment     Reason For Assessment: New TF  Diagnosis:  sepsis  Relevant Medical History: recurrent pneumonia/ UTI, [place] resident, HTN, GERD, PEG/trach, anoxic brain injury, cardiac arrest  Interdisciplinary Rounds: attended     General Information Comments: 62 y/o female admitted with sepsis r/t vent associated pneumonia. Recent admission < 1 month ago. Has trach and PEG, was receiving Isosource 1.5 @ 50 ml/hr + 250 ml flush q 6 hr at  [place] + arginaid packet BID. Today MD ordered Nutren 2.0 and pt tolerating this afternoon @ 50 ml/hr. Discussed changes with care team. NFPE done 21 no wasting seen. Wt fluctuations x 1 year.     Nutrition Discharge Planning: To be determined- TF PTA Isosource 1.5 @ 20 ml/hr advancing to goal of 50 ml/hr + 250 ml flush q 6 hr + Benny or arginaid BID     Nutrition Risk Screen     Nutrition Risk Screen: tube feeding or parenteral nutrition,dysphagia or difficulty swallowing     Nutrition/Diet History     Patient Reported Diet/Restrictions/Preferences: no oral intake  Spiritual, Cultural Beliefs, Shinto Practices, Values that Affect Care: no  Food Allergies: NKFA  Factors Affecting Nutritional Intake: NPO,difficulty/impaired swallowing     Anthropometrics  Height Method: Stated  Height: 5'  Height (inches): 60 in  Weight Method: Bed Scale  Weight: 69.8 kg 21,   Weight (lb): 153 lb ( wt fluctuations)  Ideal Body Weight (IBW), Female: 100 lb  BMI (Calculated): 30 kg/m2  BMI Grade: obesity stage 1  Usual Body Weight (UBW), k.1 kg (20)  71.7 kg , 76.6 kg 22, 72.3 kg 1/10/21, 65.7 kg 21, 59.4 kg (10/25/21), 62.3 kg 21     Lab/Procedures/Meds     Pertinent Labs Reviewed: reviewed  BMP  Lab Results   Component Value Date     2022    K 4.0 2022     2022    CO2 23 2022    BUN 12 2022    CREATININE 0.6 2022    CALCIUM 9.1 2022    ANIONGAP 11 2022    ESTGFRAFRICA >60 2022    EGFRNONAA >60 2022     Recent Labs   Lab 22  1258   POCTGLUCOSE 91     Lab Results   Component Value Date    ALBUMIN 2.5 (L) 2022          Pertinent Medications Reviewed: reviewed  Senna, polyethylene glycol, NS 1-35 ml/hr, Kbicarb, KNaPhos     Estimated/Assessed Needs     Weight Used For Calorie Calculations: 69.8 kg kg  Energy Calorie Requirements (kcal): 1500 kcal  Energy Need Method: [facility]  Protein Requirements: 1.5 g  protein/kg ( wound/ICU) = 104 g  Weight Used For Protein Calculations: 69.8 kg  Fluid Requirements (mL): 1500 ml or per MD  Estimated Fluid Requirement Method: RDA Method  CHO Requirement: N/A        Nutrition Prescription Ordered     Current Diet Order: TF above, NPO     Evaluation of Received Nutrient/Fluid Intake     Energy Calories Required: exceeding needs  Protein Required: meeting needs  Fluid Required: meeting needs  Tolerance: tolerating  % Intake of Estimated Energy Needs: >100%  % Meal Intake:NPO + TF above        Nutrition Risk     Level of Risk/Frequency of Follow-up: moderate 2 x weekly        Monitor and Evaluation     Food and Nutrient Intake: energy intake  Food and Nutrient Adminstration: enteral and parenteral nutrition administration  Anthropometric Measurements: weight  Biochemical Data, Medical Tests and Procedures: electrolyte and renal panel,gastrointestinal profile,glucose/endocrine profile  Nutrition-Focused Physical Findings: overall appearance         Nutrition Follow-Up     RD Follow-up?: Yes

## 2022-01-26 NOTE — NURSING
Notified house supervisor of need for midline placement. Ordered per Dr. Cross for antibiotic therapy.

## 2022-01-26 NOTE — PLAN OF CARE
01/25/22 1904   Patient Assessment/Suction   Level of Consciousness (AVPU) responds to pain   Respiratory Effort Normal;Unlabored   Expansion/Accessory Muscles/Retractions no retractions;no use of accessory muscles   All Lung Fields Breath Sounds Anterior:;Lateral:   MAYNOR Breath Sounds coarse   LLL Breath Sounds diminished   RUL Breath Sounds coarse   RML Breath Sounds diminished   RLL Breath Sounds diminished   Rhythm/Pattern, Respiratory assisted mechanically   Cough Frequency with stimulation   Cough Type assisted   $ Suction Charges Inline Suction Procedure Stat Charge   Secretions Amount moderate   Secretions Color white   Secretions Characteristics thick   PRE-TX-O2   O2 Device (Oxygen Therapy) ventilator   $ Is the patient on Low Flow Oxygen? Yes   Oxygen Concentration (%) 30   SpO2 100 %   Pulse Oximetry Type Continuous   $ Pulse Oximetry - Multiple Charge Pulse Oximetry - Multiple   Pulse 100   Resp (!) 33   Temp 97.88 °F (36.6 °C)   ETCO2   $ ETCO2 Usage Currently wearing   ETCO2 (mmHg) 27 mmHg   ETCO2 Device Type Bedside Monitor   Aerosol Therapy   $ Aerosol Therapy Charges Aerosol Treatment   Respiratory Treatment Status (SVN) given   Treatment Route (SVN) in-line   Patient Position (SVN) HOB elevated   Post Treatment Assessment (SVN) breath sounds unchanged   Signs of Intolerance (SVN) none   Breath Sounds Post-Respiratory Treatment   Throughout All Fields Post-Treatment All Fields   Throughout All Fields Post-Treatment no change   Post-treatment Heart Rate (beats/min) 103   Post-treatment Resp Rate (breaths/min) 25   Skin Integrity   $ Wound Care Tech Time 15 min   Area Observed Neck under tracheostomy   Skin Appearance without discoloration   Barrier used? Other (see comments)  (gauze)        Surgical Airway Portex Cuffed   No placement date or time found.   Present Prior to Hospital Arrival?: Yes  Type: Tracheostomy  Brand: Portex  Airway Device Size: 8.0  Style: Cuffed   Cuff Inflation? Inflated    Status Secured   Site Assessment Clean;Dry;No bleeding;No drainage   Ties Assessment Clean;Dry;Not needed;Intact   Airway Safety   Ambu bag with the patient? Yes, Adult Ambu   Is mask with the patient? Yes, Adult Mask   Suction set is at the bedside? Yes   Vent Select   Conventional Vent Y   Charged w/in last 24h YES   Preset Conventional Ventilator Settings   Vent Type NKV-550   Vent Mode A/CMV-VC   Humidity HME   Set Rate 24 BPM   Vt Set 350 mL   PEEP/CPAP 6.1 cmH20   Insp Time 0.8 Sec(s)   I-Trigger Type  Flow   Trigger Sensitivity Flow/I-Trigger 1.5 L/min   Patient Ventilator Parameters   Resp Rate Total 33 br/min   Peak Airway Pressure 31.3 cmH2O   Exhaled Vt 326 mL   Total Ve 10.6 mL   I:E Ratio Measured 1:1.3   Inspired Tidal Volume (VTI) 360 mL   Conventional Ventilator Alarms   Alarms On Y   Ve High Alarm 15 L/min   Ve Low Alarm 1.5 L/min   Resp Rate High Alarm 45 br/min   Press High Alarm 55 cmH2O   Apnea Rate 15   Apnea Volume (mL) 300 mL   Ready to Wean/Extubation Screen   FIO2<=50 (chart decimal) 0.3   MV<16L (chart vol.) 10.6   PEEP <=8 (chart #) 6.1   Ready to Wean Parameters   F/VT Ratio<105 (RSBI) (!) 101.23

## 2022-01-26 NOTE — ASSESSMENT & PLAN NOTE
"Patient with current diagnosis of pneumonia with concern for bacterial etiology due to  MRSA and Pseudomonas which is uncontrolled due to persistent hypoxia . I have reviewed the pertinent imaging. Current antimicrobial regimen consists of   Antibiotics (From admission, onward)            Start     Stop Route Frequency Ordered    01/25/22 1930  vancomycin 1.25 g in dextrose 5% 250 mL IVPB (ready to mix)         -- IV Every 24 hours (non-standard times) 01/24/22 2152 01/24/22 2230  mupirocin 2 % ointment         01/29 2059 Nasl 2 times daily 01/24/22 2122 01/24/22 1815  meropenem (MERREM) 2 g in sodium chloride 0.9% 100 mL IVPB  ( Pneumonia - Moderate-High MDR )         01/29 1814 IV Every 8 hours (non-standard times) 01/24/22 1808 01/24/22 1620  vancomycin - pharmacy to dose  (vancomycin IVPB)        "And" Linked Group Details    -- IV pharmacy to manage frequency 01/24/22 1808      . Cultures drawn and noted-   Microbiology Results (last 7 days)     Procedure Component Value Units Date/Time    Blood culture x two cultures. Draw prior to antibiotics. [[id number]] Collected: 01/24/22 1413    Order Status: Completed Specimen: Blood from Peripheral, Right Updated: 01/25/22 0515     Blood Culture, Routine No Growth to date    Narrative:      Aerobic and anaerobic    Blood culture x two cultures. Draw prior to antibiotics. [[id number]] Collected: 01/24/22 1413    Order Status: Completed Specimen: Blood Updated: 01/25/22 0515     Blood Culture, Routine No Growth to date    Narrative:      Aerobic and anaerobic    Urine culture [[id number]] Collected: 01/24/22 1310    Order Status: No result Specimen: Urine Updated: 01/24/22 1346       Will monitor patient closely and continue current treatment plan unchanged.      "

## 2022-01-26 NOTE — SUBJECTIVE & OBJECTIVE
Interval History:  Patient seen and examined.  Remains nonverbal at baseline.  Continues to be hypothermic throughout the day.  Plan of care reviewed with the nurse at the bedside in detail.  Plan reviewed with the patient's sisters on the phone.    Review of Systems   Unable to perform ROS: Patient unresponsive     Objective:     Vital Signs (Most Recent):  Temp: 98.78 °F (37.1 °C) (01/26/22 1215)  Pulse: 78 (01/26/22 1215)  Resp: (!) 26 (01/26/22 1215)  BP: (!) 114/59 (01/26/22 1200)  SpO2: 99 % (01/26/22 1215) Vital Signs (24h Range):  Temp:  [95.9 °F (35.5 °C)-99.86 °F (37.7 °C)] 98.78 °F (37.1 °C)  Pulse:  [] 78  Resp:  [2-80] 26  SpO2:  [97 %-100 %] 99 %  BP: ()/(53-77) 114/59     Weight: 69.8 kg (153 lb 14.1 oz)  Body mass index is 30.05 kg/m².    Intake/Output Summary (Last 24 hours) at 1/26/2022 1519  Last data filed at 1/26/2022 1443  Gross per 24 hour   Intake 1336.95 ml   Output 1280 ml   Net 56.95 ml      Physical Exam  Vitals and nursing note reviewed.   Constitutional:       General: She is not in acute distress.     Appearance: She is ill-appearing. She is not diaphoretic.   HENT:      Mouth/Throat:      Pharynx: No oropharyngeal exudate.   Eyes:      General:         Right eye: No discharge.         Left eye: No discharge.      Comments: Pupils sluggish   Neck:      Thyroid: No thyromegaly.      Vascular: No JVD.      Trachea: No tracheal deviation.      Comments: tracheostomy in place  Cardiovascular:      Rate and Rhythm: Normal rate and regular rhythm.      Heart sounds: No murmur heard.  No friction rub. No gallop.    Pulmonary:      Effort: No respiratory distress.      Breath sounds: Wheezing and rhonchi present. No rales.      Comments: Breath sounds coarse on ventilator.  Abdominal:      General: There is no distension.      Palpations: Abdomen is soft.      Tenderness: There is no abdominal tenderness.      Comments: Percutaneous gastrostomy in place  Cholecystostomy in place    Genitourinary:     Comments: Hamilton catheter noted in place.  Musculoskeletal:         General: No tenderness or deformity.   Skin:     Capillary Refill: Capillary refill takes 2 to 3 seconds.      Findings: No erythema or rash.   Neurological:      Mental Status: Mental status is at baseline. She is disoriented.      Motor: No abnormal muscle tone.      Deep Tendon Reflexes: Reflexes normal.      Comments: Patient unresponsive at present         Significant Labs: All pertinent labs within the past 24 hours have been reviewed.    Significant Imaging: I have reviewed all pertinent imaging results/findings within the past 24 hours.

## 2022-01-27 VITALS
BODY MASS INDEX: 30.21 KG/M2 | WEIGHT: 153.88 LBS | HEART RATE: 85 BPM | HEIGHT: 60 IN | SYSTOLIC BLOOD PRESSURE: 142 MMHG | TEMPERATURE: 98 F | RESPIRATION RATE: 43 BRPM | DIASTOLIC BLOOD PRESSURE: 62 MMHG | OXYGEN SATURATION: 100 %

## 2022-01-27 LAB
ALBUMIN SERPL BCP-MCNC: 2.6 G/DL (ref 3.5–5.2)
ALP SERPL-CCNC: 153 U/L (ref 55–135)
ALT SERPL W/O P-5'-P-CCNC: 30 U/L (ref 10–44)
ANION GAP SERPL CALC-SCNC: 11 MMOL/L (ref 8–16)
AST SERPL-CCNC: 29 U/L (ref 10–40)
BASOPHILS # BLD AUTO: 0.03 K/UL (ref 0–0.2)
BASOPHILS NFR BLD: 0.8 % (ref 0–1.9)
BILIRUB SERPL-MCNC: 0.5 MG/DL (ref 0.1–1)
BUN SERPL-MCNC: 15 MG/DL (ref 6–20)
CALCIUM SERPL-MCNC: 9.2 MG/DL (ref 8.7–10.5)
CHLORIDE SERPL-SCNC: 109 MMOL/L (ref 95–110)
CO2 SERPL-SCNC: 22 MMOL/L (ref 23–29)
CREAT SERPL-MCNC: 0.5 MG/DL (ref 0.5–1.4)
DIFFERENTIAL METHOD: ABNORMAL
EOSINOPHIL # BLD AUTO: 0.1 K/UL (ref 0–0.5)
EOSINOPHIL NFR BLD: 2.3 % (ref 0–8)
ERYTHROCYTE [DISTWIDTH] IN BLOOD BY AUTOMATED COUNT: 19.5 % (ref 11.5–14.5)
EST. GFR  (AFRICAN AMERICAN): >60 ML/MIN/1.73 M^2
EST. GFR  (NON AFRICAN AMERICAN): >60 ML/MIN/1.73 M^2
GLUCOSE SERPL-MCNC: 86 MG/DL (ref 70–110)
HCT VFR BLD AUTO: 29.9 % (ref 37–48.5)
HGB BLD-MCNC: 8.8 G/DL (ref 12–16)
IMM GRANULOCYTES # BLD AUTO: 0.01 K/UL (ref 0–0.04)
IMM GRANULOCYTES NFR BLD AUTO: 0.3 % (ref 0–0.5)
LACTATE SERPL-SCNC: 1 MMOL/L (ref 0.5–2.2)
LYMPHOCYTES # BLD AUTO: 1.2 K/UL (ref 1–4.8)
LYMPHOCYTES NFR BLD: 29.4 % (ref 18–48)
MAGNESIUM SERPL-MCNC: 1.8 MG/DL (ref 1.6–2.6)
MCH RBC QN AUTO: 27.8 PG (ref 27–31)
MCHC RBC AUTO-ENTMCNC: 29.4 G/DL (ref 32–36)
MCV RBC AUTO: 95 FL (ref 82–98)
MONOCYTES # BLD AUTO: 0.6 K/UL (ref 0.3–1)
MONOCYTES NFR BLD: 14.8 % (ref 4–15)
NEUTROPHILS # BLD AUTO: 2.1 K/UL (ref 1.8–7.7)
NEUTROPHILS NFR BLD: 52.4 % (ref 38–73)
NRBC BLD-RTO: 0 /100 WBC
PHOSPHATE SERPL-MCNC: 2.2 MG/DL (ref 2.7–4.5)
PLATELET # BLD AUTO: 406 K/UL (ref 150–450)
PMV BLD AUTO: 10.2 FL (ref 9.2–12.9)
POCT GLUCOSE: 103 MG/DL (ref 70–110)
POCT GLUCOSE: 113 MG/DL (ref 70–110)
POTASSIUM SERPL-SCNC: 4.3 MMOL/L (ref 3.5–5.1)
PROT SERPL-MCNC: 7.5 G/DL (ref 6–8.4)
RBC # BLD AUTO: 3.16 M/UL (ref 4–5.4)
SODIUM SERPL-SCNC: 142 MMOL/L (ref 136–145)
WBC # BLD AUTO: 3.98 K/UL (ref 3.9–12.7)

## 2022-01-27 PROCEDURE — 84100 ASSAY OF PHOSPHORUS: CPT | Performed by: HOSPITALIST

## 2022-01-27 PROCEDURE — 36415 COLL VENOUS BLD VENIPUNCTURE: CPT | Performed by: HOSPITALIST

## 2022-01-27 PROCEDURE — 25000242 PHARM REV CODE 250 ALT 637 W/ HCPCS: Performed by: HOSPITALIST

## 2022-01-27 PROCEDURE — C1729 CATH, DRAINAGE: HCPCS

## 2022-01-27 PROCEDURE — 80053 COMPREHEN METABOLIC PANEL: CPT | Performed by: HOSPITALIST

## 2022-01-27 PROCEDURE — 83735 ASSAY OF MAGNESIUM: CPT | Performed by: HOSPITALIST

## 2022-01-27 PROCEDURE — 94640 AIRWAY INHALATION TREATMENT: CPT

## 2022-01-27 PROCEDURE — 99900026 HC AIRWAY MAINTENANCE (STAT)

## 2022-01-27 PROCEDURE — 94761 N-INVAS EAR/PLS OXIMETRY MLT: CPT

## 2022-01-27 PROCEDURE — 99900035 HC TECH TIME PER 15 MIN (STAT)

## 2022-01-27 PROCEDURE — 25000003 PHARM REV CODE 250: Performed by: HOSPITALIST

## 2022-01-27 PROCEDURE — 83605 ASSAY OF LACTIC ACID: CPT | Performed by: HOSPITALIST

## 2022-01-27 PROCEDURE — 27200966 HC CLOSED SUCTION SYSTEM

## 2022-01-27 PROCEDURE — 85025 COMPLETE CBC W/AUTO DIFF WBC: CPT | Performed by: HOSPITALIST

## 2022-01-27 PROCEDURE — 27000221 HC OXYGEN, UP TO 24 HOURS

## 2022-01-27 PROCEDURE — 94003 VENT MGMT INPAT SUBQ DAY: CPT

## 2022-01-27 PROCEDURE — 63600175 PHARM REV CODE 636 W HCPCS: Performed by: HOSPITALIST

## 2022-01-27 RX ORDER — SCOLOPAMINE TRANSDERMAL SYSTEM 1 MG/1
1 PATCH, EXTENDED RELEASE TRANSDERMAL
Qty: 30 PATCH | Refills: 0 | Status: ON HOLD | OUTPATIENT
Start: 2022-01-29 | End: 2022-08-03 | Stop reason: HOSPADM

## 2022-01-27 RX ORDER — VANCOMYCIN HCL IN 5 % DEXTROSE 1G/250ML
1000 PLASTIC BAG, INJECTION (ML) INTRAVENOUS
Qty: 250 ML | Refills: 0 | Status: SHIPPED | OUTPATIENT
Start: 2022-01-27 | End: 2022-02-01

## 2022-01-27 RX ADMIN — MEROPENEM 2 G: 1 INJECTION, POWDER, FOR SOLUTION INTRAVENOUS at 02:01

## 2022-01-27 RX ADMIN — Medication 800 MG: at 09:01

## 2022-01-27 RX ADMIN — IPRATROPIUM BROMIDE AND ALBUTEROL SULFATE 3 ML: 2.5; .5 SOLUTION RESPIRATORY (INHALATION) at 07:01

## 2022-01-27 RX ADMIN — CLONAZEPAM 0.5 MG: 0.5 TABLET ORAL at 09:01

## 2022-01-27 RX ADMIN — MUPIROCIN: 20 OINTMENT TOPICAL at 09:01

## 2022-01-27 RX ADMIN — POTASSIUM & SODIUM PHOSPHATES POWDER PACK 280-160-250 MG 2 PACKET: 280-160-250 PACK at 06:01

## 2022-01-27 RX ADMIN — IPRATROPIUM BROMIDE AND ALBUTEROL SULFATE 3 ML: 2.5; .5 SOLUTION RESPIRATORY (INHALATION) at 12:01

## 2022-01-27 RX ADMIN — Medication 800 MG: at 06:01

## 2022-01-27 RX ADMIN — ACETAMINOPHEN 1000 MG: 500 TABLET ORAL at 09:01

## 2022-01-27 RX ADMIN — MIDODRINE HYDROCHLORIDE 10 MG: 5 TABLET ORAL at 09:01

## 2022-01-27 RX ADMIN — SENNOSIDES AND DOCUSATE SODIUM 1 TABLET: 8.6; 5 TABLET ORAL at 09:01

## 2022-01-27 RX ADMIN — PANTOPRAZOLE SODIUM 40 MG: 40 TABLET, DELAYED RELEASE ORAL at 09:01

## 2022-01-27 RX ADMIN — POLYETHYLENE GLYCOL 3350 17 G: 17 POWDER, FOR SOLUTION ORAL at 09:01

## 2022-01-27 RX ADMIN — POTASSIUM & SODIUM PHOSPHATES POWDER PACK 280-160-250 MG 2 PACKET: 280-160-250 PACK at 09:01

## 2022-01-27 RX ADMIN — VANCOMYCIN HYDROCHLORIDE 1000 MG: 1 INJECTION, POWDER, LYOPHILIZED, FOR SOLUTION INTRAVENOUS at 06:01

## 2022-01-27 RX ADMIN — MEROPENEM 2 G: 1 INJECTION, POWDER, FOR SOLUTION INTRAVENOUS at 09:01

## 2022-01-27 NOTE — PLAN OF CARE
Adt 30 order completed for [facility] to [facility] located at [address] [place] [address] dr jerry garza [id number].    01/27/22 1148   Post-Acute Status   Post-Acute Authorization Placement   Post-Acute Placement Status Set-up Complete/Auth obtained

## 2022-01-27 NOTE — PLAN OF CARE
Pt at goal for discharge to LTAC facility per Dr. Cross.   Problem: Adult Inpatient Plan of Care  Goal: Plan of Care Review  Outcome: Met  Goal: Patient-Specific Goal (Individualized)  Outcome: Met  Goal: Absence of Hospital-Acquired Illness or Injury  Outcome: Met  Goal: Optimal Comfort and Wellbeing  Outcome: Met  Goal: Readiness for Transition of Care  Outcome: Met     Problem: Communication Impairment (Mechanical Ventilation, Invasive)  Goal: Effective Communication  Outcome: Met     Problem: Device-Related Complication Risk (Mechanical Ventilation, Invasive)  Goal: Optimal Device Function  Outcome: Met     Problem: Inability to Wean (Mechanical Ventilation, Invasive)  Goal: Mechanical Ventilation Liberation  Outcome: Met     Problem: Nutrition Impairment (Mechanical Ventilation, Invasive)  Goal: Optimal Nutrition Delivery  Outcome: Met     Problem: Skin and Tissue Injury (Mechanical Ventilation, Invasive)  Goal: Absence of Device-Related Skin and Tissue Injury  Outcome: Met     Problem: Ventilator-Induced Lung Injury (Mechanical Ventilation, Invasive)  Goal: Absence of Ventilator-Induced Lung Injury  Outcome: Met     Problem: Communication Impairment (Artificial Airway)  Goal: Effective Communication  Outcome: Met     Problem: Device-Related Complication Risk (Artificial Airway)  Goal: Optimal Device Function  Outcome: Met     Problem: Skin and Tissue Injury (Artificial Airway)  Goal: Absence of Device-Related Skin or Tissue Injury  Outcome: Met     Problem: Noninvasive Ventilation Acute  Goal: Effective Unassisted Ventilation and Oxygenation  Outcome: Met     Problem: Infection  Goal: Absence of Infection Signs and Symptoms  Outcome: Met     Problem: Adjustment to Illness (Sepsis/Septic Shock)  Goal: Optimal Coping  Outcome: Met     Problem: Bleeding (Sepsis/Septic Shock)  Goal: Absence of Bleeding  Outcome: Met     Problem: Glycemic Control Impaired (Sepsis/Septic Shock)  Goal: Blood Glucose Level Within  Desired Range  Outcome: Met     Problem: Infection Progression (Sepsis/Septic Shock)  Goal: Absence of Infection Signs and Symptoms  Outcome: Met     Problem: Nutrition Impaired (Sepsis/Septic Shock)  Goal: Optimal Nutrition Intake  Outcome: Met     Problem: Impaired Wound Healing  Goal: Optimal Wound Healing  Outcome: Met     Problem: Skin Injury Risk Increased  Goal: Skin Health and Integrity  Outcome: Met     Problem: Fall Injury Risk  Goal: Absence of Fall and Fall-Related Injury  Outcome: Met     Problem: Aspiration (Enteral Nutrition)  Goal: Absence of Aspiration Signs and Symptoms  Outcome: Met     Problem: Device-Related Complication Risk (Enteral Nutrition)  Goal: Safe, Effective Therapy Delivery  Outcome: Met     Problem: Feeding Intolerance (Enteral Nutrition)  Goal: Feeding Tolerance  Outcome: Met

## 2022-01-27 NOTE — NURSING
PICC nurse called to report that patient is not a candidate for midline placement due to anatomical positioning. He stated that they were starla to place the previous midline due to the bilateral upper extremity contractions.

## 2022-01-27 NOTE — PLAN OF CARE
Call received from Justine with [facility] that she came by to see the pt today and they are ready to accept pt back and can get her bed ready for admit today. Dr. Cross updated.    01/27/22 0955   Post-Acute Status   Post-Acute Authorization Placement   Post-Acute Placement Status Pending medical clearance/testing

## 2022-01-27 NOTE — ASSESSMENT & PLAN NOTE
Patient's anemia is currently controlled. Has not received any PRBCs to date..   Current CBC reviewed-   Lab Results   Component Value Date    HGB 8.1 (L) 01/26/2022    HCT 26.9 (L) 01/26/2022     Monitor serial CBC and transfuse if patient becomes hemodynamically unstable, symptomatic or H/H drops below 7/21.

## 2022-01-27 NOTE — PROGRESS NOTES
Pharmacokinetic Assessment Follow Up: IV Vancomycin    Vancomycin serum concentration assessment(s):    The trough level was drawn correctly and can be used to guide therapy at this time. The measurement is above the desired definitive target range of 15 to 20 mcg/mL.    Vancomycin Regimen Plan:    Change regimen to Vancomycin 1000 mg IV every 24 hours with next serum trough concentration measured at 0500 prior to next dose on 1/28/22    Drug levels (last 3 results):  Recent Labs   Lab Result Units 01/26/22  1830   Vancomycin-Trough ug/mL 22.5*       Pharmacy will continue to follow and monitor vancomycin.    Please contact pharmacy at extension [phone] for questions regarding this assessment.    Thank you for the consult,   Mayra Spencer       Patient brief summary:  Genna Felix is a 61 y.o. female initiated on antimicrobial therapy with IV Vancomycin for treatment of sepsis      Drug Allergies:   Review of patient's allergies indicates:  No Known Allergies    Actual Body Weight:   69.8 kg    Renal Function:   Estimated Creatinine Clearance: 88 mL/min (based on SCr of 0.6 mg/dL).,     Dialysis Method (if applicable):  N/A    CBC (last 72 hours):  Recent Labs   Lab Result Units 01/24/22  1302 01/25/22  0359 01/26/22  0354   WBC K/uL 4.00 3.75* 3.33*   Hemoglobin g/dL 8.4* 8.3* 8.1*   Hematocrit % 28.6* 28.9* 26.9*   Platelets K/uL 409 394 427   Gran % % 46.6 52.0 49.3   Lymph % % 35.5 33.0 32.4   Mono % % 12.5 7.0 14.4   Eosinophil % % 4.3 4.0 2.7   Basophil % % 0.8 0.0 0.9   Differential Method  Automated Manual Automated       Metabolic Panel (last 72 hours):  Recent Labs   Lab Result Units 01/24/22  1302 01/24/22  1310 01/25/22  0359 01/26/22  0354   Sodium mmol/L 144  --  142 144   Potassium mmol/L 4.2  --  4.1 4.0   Chloride mmol/L 108  --  110 110   CO2 mmol/L 24  --  21* 23   Glucose mg/dL 66*  --  61* 83   Glucose, UA   --  Negative  --   --    BUN mg/dL 16  --  12 12   Creatinine mg/dL 0.5  --  0.5 0.6    Albumin g/dL 2.4*  --  2.4* 2.5*   Total Bilirubin mg/dL 0.4  --  0.5 0.5   Alkaline Phosphatase U/L 130  --  130 156*   AST U/L 24  --  24 44*   ALT U/L 25  --  21 34   Magnesium mg/dL  --   --  1.7 1.9   Phosphorus mg/dL  --   --  3.8 3.8       Vancomycin Administrations:  vancomycin given in the last 96 hours                     vancomycin 1.25 g in dextrose 5% 250 mL IVPB (ready to mix) (mg) 1,250 mg New Bag 01/25/22 2000    vancomycin 1.25 g in dextrose 5% 250 mL IVPB (ready to mix) (mg) 1,250 mg New Bag 01/24/22 1930                    Microbiologic Results:  Microbiology Results (last 7 days)       Procedure Component Value Units Date/Time    Urine culture [[id number]] Collected: 01/24/22 1310    Order Status: Completed Specimen: Urine Updated: 01/26/22 0935     Urine Culture, Routine No growth    Narrative:      Specimen Source->Urine    Blood culture x two cultures. Draw prior to antibiotics. [[id number]] Collected: 01/24/22 1413    Order Status: Completed Specimen: Blood from Peripheral, Right Updated: 01/25/22 2312     Blood Culture, Routine No Growth to date      No Growth to date    Narrative:      Aerobic and anaerobic    Blood culture x two cultures. Draw prior to antibiotics. [[id number]] Collected: 01/24/22 1413    Order Status: Completed Specimen: Blood Updated: 01/25/22 2312     Blood Culture, Routine No Growth to date      No Growth to date    Narrative:      Aerobic and anaerobic

## 2022-01-27 NOTE — PROGRESS NOTES
[facility] Hospital Medicine  Progress Note    Patient Name: Genna Felix  MRN: [id number]  Patient Class: IP- Inpatient   Admission Date: 1/24/2022  Length of Stay: 2 days  Attending Physician: Tolu Cross MD  Primary Care Provider: Primary Doctor No        Subjective:     Principal Problem:Severe sepsis        HPI:  Patient is a 41-year-old  female with a past medical history significant for anoxic brain injury secondary to cardiac arrest and secondary persistent vegetative state who has chronic PEG/trach status with baseline GCS of approximately 4 who presents to the hospital after recent cholecystostomy tube placement for what appears to be sepsis.  Patient is nonverbal and unable to provide any further history.  Of the patient's outside facility, the patient was noted to be hypothermic.  Patient received vancomycin on the morning of admission, and was transition to the emergency department after hypothermia was noted.  No further history is unable to be elucidated given the patient's nonverbal status.      Overview/Hospital Course:  No notes on file    Interval History:  Patient seen and examined.  Remains nonverbal at baseline.  Continues to be hypothermic throughout the day.  Plan of care reviewed with the nurse at the bedside in detail.  Plan reviewed with the patient's sisters on the phone.    Review of Systems   Unable to perform ROS: Patient unresponsive     Objective:     Vital Signs (Most Recent):  Temp: 98.78 °F (37.1 °C) (01/26/22 1215)  Pulse: 78 (01/26/22 1215)  Resp: (!) 26 (01/26/22 1215)  BP: (!) 114/59 (01/26/22 1200)  SpO2: 99 % (01/26/22 1215) Vital Signs (24h Range):  Temp:  [95.9 °F (35.5 °C)-99.86 °F (37.7 °C)] 98.78 °F (37.1 °C)  Pulse:  [] 78  Resp:  [2-80] 26  SpO2:  [97 %-100 %] 99 %  BP: ()/(53-77) 114/59     Weight: 69.8 kg (153 lb 14.1 oz)  Body mass index is 30.05 kg/m².    Intake/Output Summary (Last 24 hours) at 1/26/2022  1519  Last data filed at 1/26/2022 1443  Gross per 24 hour   Intake 1336.95 ml   Output 1280 ml   Net 56.95 ml      Physical Exam  Vitals and nursing note reviewed.   Constitutional:       General: She is not in acute distress.     Appearance: She is ill-appearing. She is not diaphoretic.   HENT:      Mouth/Throat:      Pharynx: No oropharyngeal exudate.   Eyes:      General:         Right eye: No discharge.         Left eye: No discharge.      Comments: Pupils sluggish   Neck:      Thyroid: No thyromegaly.      Vascular: No JVD.      Trachea: No tracheal deviation.      Comments: tracheostomy in place  Cardiovascular:      Rate and Rhythm: Normal rate and regular rhythm.      Heart sounds: No murmur heard.  No friction rub. No gallop.    Pulmonary:      Effort: No respiratory distress.      Breath sounds: Wheezing and rhonchi present. No rales.      Comments: Breath sounds coarse on ventilator.  Abdominal:      General: There is no distension.      Palpations: Abdomen is soft.      Tenderness: There is no abdominal tenderness.      Comments: Percutaneous gastrostomy in place  Cholecystostomy in place   Genitourinary:     Comments: Hamilton catheter noted in place.  Musculoskeletal:         General: No tenderness or deformity.   Skin:     Capillary Refill: Capillary refill takes 2 to 3 seconds.      Findings: No erythema or rash.   Neurological:      Mental Status: Mental status is at baseline. She is disoriented.      Motor: No abnormal muscle tone.      Deep Tendon Reflexes: Reflexes normal.      Comments: Patient unresponsive at present         Significant Labs: All pertinent labs within the past 24 hours have been reviewed.    Significant Imaging: I have reviewed all pertinent imaging results/findings within the past 24 hours.      Assessment/Plan:      * Severe sepsis  This patient does have evidence of infective focus  My overall impression is sepsis. Vital signs were reviewed and noted in progress  "note.  Antibiotics given-   Antibiotics (From admission, onward)            Start     Stop Route Frequency Ordered    01/25/22 1930  vancomycin 1.25 g in dextrose 5% 250 mL IVPB (ready to mix)         -- IV Every 24 hours (non-standard times) 01/24/22 2152 01/24/22 2230  mupirocin 2 % ointment         01/29 2059 Nasl 2 times daily 01/24/22 2122 01/24/22 1815  meropenem (MERREM) 2 g in sodium chloride 0.9% 100 mL IVPB  ( Pneumonia - Moderate-High MDR )         01/29 1814 IV Every 8 hours (non-standard times) 01/24/22 1808 01/24/22 1620  vancomycin - pharmacy to dose  (vancomycin IVPB)        "And" Linked Group Details    -- IV pharmacy to manage frequency 01/24/22 1808        Cultures were taken-   Microbiology Results (last 7 days)     Procedure Component Value Units Date/Time    Urine culture [[id number]] Collected: 01/24/22 1310    Order Status: Completed Specimen: Urine Updated: 01/26/22 0935     Urine Culture, Routine No growth    Narrative:      Specimen Source->Urine    Blood culture x two cultures. Draw prior to antibiotics. [[id number]] Collected: 01/24/22 1413    Order Status: Completed Specimen: Blood from Peripheral, Right Updated: 01/25/22 2312     Blood Culture, Routine No Growth to date      No Growth to date    Narrative:      Aerobic and anaerobic    Blood culture x two cultures. Draw prior to antibiotics. [[id number]] Collected: 01/24/22 1413    Order Status: Completed Specimen: Blood Updated: 01/25/22 2312     Blood Culture, Routine No Growth to date      No Growth to date    Narrative:      Aerobic and anaerobic        Latest lactate reviewed, they are-  Recent Labs   Lab 01/24/22  1647 01/25/22  0359 01/26/22  0354   LACTATE 1.0 1.0 0.8       Organ dysfunction indicated by Encephalopathy   Source- pneumonia versus abdominal    Check CT scan in next 1-2 days if patient becomes more clinically stable.      Acute on chronic respiratory failure with hypoxia  Patient with Hypoxic Respiratory " "failure which is Chronic.  she is chronically on a ventilator.   Supplemental oxygen was provided and noted- Vent Mode: A/CMV-VC  Oxygen Concentration (%):  [30] 30  Resp Rate Total:  [24 br/min-43 br/min] 27 br/min  Vt Set:  [350 mL] 350 mL  PEEP/CPAP:  [4.1 cmH20-6.7 cmH20] 5.8 cmH20  Mean Airway Pressure:  [11.3 cmH20-15.2 cmH20] 13.2 cmH20.   Signs/symptoms of respiratory failure include- increased work of breathing and respiratory distress. Contributing diagnoses includes - Pleural effusion and Pneumonia Labs and images were reviewed. Patient Has recent ABG, which has been reviewed. Will treat underlying causes and adjust management of respiratory failure as follows- continue treatment for chronic respiratory failure.    Ventilator associated pneumonia  Patient with current diagnosis of pneumonia with concern for bacterial etiology due to  MRSA and Pseudomonas which is uncontrolled due to persistent hypoxia . I have reviewed the pertinent imaging. Current antimicrobial regimen consists of   Antibiotics (From admission, onward)            Start     Stop Route Frequency Ordered    01/27/22 0600  vancomycin in dextrose 5 % 1 gram/250 mL IVPB 1,000 mg         -- IV Every 24 hours (non-standard times) 01/26/22 1952 01/24/22 2230  mupirocin 2 % ointment         01/29 2059 Nasl 2 times daily 01/24/22 2122 01/24/22 1815  meropenem (MERREM) 2 g in sodium chloride 0.9% 100 mL IVPB  ( Pneumonia - Moderate-High MDR )         01/29 1814 IV Every 8 hours (non-standard times) 01/24/22 1808    01/24/22 1620  vancomycin - pharmacy to dose  (vancomycin IVPB)        "And" Linked Group Details    -- IV pharmacy to manage frequency 01/24/22 1808      . Cultures drawn and noted-   Microbiology Results (last 7 days)     Procedure Component Value Units Date/Time    Urine culture [[id number]] Collected: 01/24/22 1310    Order Status: Completed Specimen: Urine Updated: 01/26/22 0935     Urine Culture, Routine No growth    " Narrative:      Specimen Source->Urine    Blood culture x two cultures. Draw prior to antibiotics. [[id number]] Collected: 01/24/22 1413    Order Status: Completed Specimen: Blood from Peripheral, Right Updated: 01/25/22 2312     Blood Culture, Routine No Growth to date      No Growth to date    Narrative:      Aerobic and anaerobic    Blood culture x two cultures. Draw prior to antibiotics. [[id number]] Collected: 01/24/22 1413    Order Status: Completed Specimen: Blood Updated: 01/25/22 2312     Blood Culture, Routine No Growth to date      No Growth to date    Narrative:      Aerobic and anaerobic       Will monitor patient closely and continue current treatment plan unchanged.        Deep tissue injury        Cirrhosis of liver  Patient with known Cirrhosis with Child's class A.   MELD-Na score; MELD-Na score: 7 at 1/26/2022  3:54 AM  MELD score: 7 at 1/26/2022  3:54 AM  Calculated from:  Serum Creatinine: 0.6 mg/dL (Using min of 1 mg/dL) at 1/26/2022  3:54 AM  Serum Sodium: 144 mmol/L (Using max of 137 mmol/L) at 1/26/2022  3:54 AM  Total Bilirubin: 0.5 mg/dL (Using min of 1 mg/dL) at 1/26/2022  3:54 AM  INR(ratio): 1.1 at 1/24/2022  1:02 PM  Age: 61 years    Continue chronic meds. Etiology likely Hepatitis. Will avoid any hepatotoxic meds, and monitor CMP/INR for synthetic function.         Pressure injury of left buttock, stage 4  Consult wound care      Difficult Hamilton catheter placement  Continue Hamilton      Gastroesophageal reflux disease  PPI indicated      Essential hypertension  Patient borderline hypotensive.  Hold antihypertensives at this point in time.      PEG (percutaneous endoscopic gastrostomy) status  Continue routine percutaneous gastrostomy care      Tracheostomy dependence  Continue tracheostomy care      Goals of care, counseling/discussion   Advance Care Planning     Date: 01/26/2022    Code Status  In light of the patients advanced and life limiting illness,I engaged the the family in a  conversation about the patient's preferences for care  at the very end of life. Specifically, I spoke with the patient's sisters.  The patient wishes to have a natural, peaceful death.  Along those lines, the patient does not wish to have CPR or other invasive treatments performed when her heart and/or breathing stops. I communicated to the family that a DNR order would be placed in her medical record to reflect this preference.  I spent a total of 32 minutes engaging the patient in this advance care planning discussion.    Patient with very poor quality of life given tracheostomy/PEG tube dependence.  Palliative medicine consulted.             Persistent vegetative state  Patient with baseline GCS approximately 4-5.  Very poor long-term survival.  Family not present for conversation at this point in time.      Anemia of chronic disease  Patient's anemia is currently controlled. Has not received any PRBCs to date..   Current CBC reviewed-   Lab Results   Component Value Date    HGB 8.1 (L) 01/26/2022    HCT 26.9 (L) 01/26/2022     Monitor serial CBC and transfuse if patient becomes hemodynamically unstable, symptomatic or H/H drops below 7/21.           VTE Risk Mitigation (From admission, onward)         Ordered     enoxaparin injection 40 mg  Daily         01/24/22 1808     IP VTE HIGH RISK PATIENT  Once         01/24/22 1808                Discharge Planning   [place]:      Code Status: DNR   Is the patient medically ready for discharge?:     Reason for patient still in hospital (select all that apply): Treatment  Discharge Plan A: Return to nursing home            Critical care time spent on the evaluation and treatment of severe organ dysfunction, review of pertinent labs and imaging studies, discussions with consulting providers and discussions with patient/family: 41 minutes.      Tolu Cross MD  Department of Hospital Medicine   [facility]

## 2022-01-27 NOTE — ASSESSMENT & PLAN NOTE
"Patient with current diagnosis of pneumonia with concern for bacterial etiology due to  MRSA and Pseudomonas which is uncontrolled due to persistent hypoxia . I have reviewed the pertinent imaging. Current antimicrobial regimen consists of   Antibiotics (From admission, onward)            Start     Stop Route Frequency Ordered    01/27/22 0600  vancomycin in dextrose 5 % 1 gram/250 mL IVPB 1,000 mg         -- IV Every 24 hours (non-standard times) 01/26/22 1952 01/24/22 2230  mupirocin 2 % ointment         01/29 2059 Nasl 2 times daily 01/24/22 2122 01/24/22 1815  meropenem (MERREM) 2 g in sodium chloride 0.9% 100 mL IVPB  ( Pneumonia - Moderate-High MDR )         01/29 1814 IV Every 8 hours (non-standard times) 01/24/22 1808 01/24/22 1620  vancomycin - pharmacy to dose  (vancomycin IVPB)        "And" Linked Group Details    -- IV pharmacy to manage frequency 01/24/22 1808      . Cultures drawn and noted-   Microbiology Results (last 7 days)     Procedure Component Value Units Date/Time    Urine culture [[id number]] Collected: 01/24/22 1310    Order Status: Completed Specimen: Urine Updated: 01/26/22 0935     Urine Culture, Routine No growth    Narrative:      Specimen Source->Urine    Blood culture x two cultures. Draw prior to antibiotics. [[id number]] Collected: 01/24/22 1413    Order Status: Completed Specimen: Blood from Peripheral, Right Updated: 01/25/22 2312     Blood Culture, Routine No Growth to date      No Growth to date    Narrative:      Aerobic and anaerobic    Blood culture x two cultures. Draw prior to antibiotics. [[id number]] Collected: 01/24/22 1413    Order Status: Completed Specimen: Blood Updated: 01/25/22 2312     Blood Culture, Routine No Growth to date      No Growth to date    Narrative:      Aerobic and anaerobic       Will monitor patient closely and continue current treatment plan unchanged.      "

## 2022-01-27 NOTE — PLAN OF CARE
I updated the pts nurse that report can be called to [facility] at [phone] and afterwards I will set up [facility].    01/27/22 1148   Post-Acute Status   Post-Acute Authorization Placement   Post-Acute Placement Status Set-up Complete/Auth obtained

## 2022-01-27 NOTE — NURSING
Report called to Genna at [facility]. [facility] will be assuming care of the patient. Phone number left with [facility] for any questions.

## 2022-01-27 NOTE — PLAN OF CARE
The pt is cleared from case management for discharge to [facility].    01/27/22 1208   Final Note   Assessment Type Final Discharge Note   Anticipated Discharge Disposition LTAC

## 2022-01-27 NOTE — ASSESSMENT & PLAN NOTE
Advance Care Planning     Date: 01/26/2022    Code Status  In light of the patients advanced and life limiting illness,I engaged the the family in a conversation about the patient's preferences for care  at the very end of life. Specifically, I spoke with the patient's sisters.  The patient wishes to have a natural, peaceful death.  Along those lines, the patient does not wish to have CPR or other invasive treatments performed when her heart and/or breathing stops. I communicated to the family that a DNR order would be placed in her medical record to reflect this preference.  I spent a total of 32 minutes engaging the patient in this advance care planning discussion.    Patient with very poor quality of life given tracheostomy/PEG tube dependence.  Palliative medicine consulted.

## 2022-01-27 NOTE — ASSESSMENT & PLAN NOTE
Patient with known Cirrhosis with Child's class A.   MELD-Na score; MELD-Na score: 7 at 1/26/2022  3:54 AM  MELD score: 7 at 1/26/2022  3:54 AM  Calculated from:  Serum Creatinine: 0.6 mg/dL (Using min of 1 mg/dL) at 1/26/2022  3:54 AM  Serum Sodium: 144 mmol/L (Using max of 137 mmol/L) at 1/26/2022  3:54 AM  Total Bilirubin: 0.5 mg/dL (Using min of 1 mg/dL) at 1/26/2022  3:54 AM  INR(ratio): 1.1 at 1/24/2022  1:02 PM  Age: 61 years    Continue chronic meds. Etiology likely Hepatitis. Will avoid any hepatotoxic meds, and monitor CMP/INR for synthetic function.

## 2022-01-27 NOTE — ASSESSMENT & PLAN NOTE
Patient with Hypoxic Respiratory failure which is Chronic.  she is chronically on a ventilator.   Supplemental oxygen was provided and noted- Vent Mode: A/CMV-VC  Oxygen Concentration (%):  [30] 30  Resp Rate Total:  [24 br/min-43 br/min] 27 br/min  Vt Set:  [350 mL] 350 mL  PEEP/CPAP:  [4.1 cmH20-6.7 cmH20] 5.8 cmH20  Mean Airway Pressure:  [11.3 cmH20-15.2 cmH20] 13.2 cmH20.   Signs/symptoms of respiratory failure include- increased work of breathing and respiratory distress. Contributing diagnoses includes - Pleural effusion and Pneumonia Labs and images were reviewed. Patient Has recent ABG, which has been reviewed. Will treat underlying causes and adjust management of respiratory failure as follows- continue treatment for chronic respiratory failure.

## 2022-01-27 NOTE — PLAN OF CARE
I called Justine with [facility] at [phone] to update her that the pt has discharge orders to return today. I sent the orders to her via careport. She is going to call me back once she has the room assignment ready and I will place an adt 30 for [facility]. CM following.    01/27/22 1139   Post-Acute Status   Post-Acute Authorization Placement   Post-Acute Placement Status Referrals Sent

## 2022-01-27 NOTE — PLAN OF CARE
Pt continues on current POC. No new orders noted. Pt tolerating tube feeding at goal rate of 50 ml/hr. Peripheral IV r wrist patent for IV abx therapy, Pt will need long-term access for abx. Safety measures active, bed alarm set, will continue to monitor.    Problem: Adult Inpatient Plan of Care  Goal: Plan of Care Review  Outcome: Ongoing, Progressing  Goal: Patient-Specific Goal (Individualized)  Outcome: Ongoing, Progressing  Goal: Absence of Hospital-Acquired Illness or Injury  Outcome: Ongoing, Progressing  Goal: Optimal Comfort and Wellbeing  Outcome: Ongoing, Progressing  Goal: Readiness for Transition of Care  Outcome: Ongoing, Progressing     Problem: Communication Impairment (Mechanical Ventilation, Invasive)  Goal: Effective Communication  Outcome: Ongoing, Progressing     Problem: Device-Related Complication Risk (Mechanical Ventilation, Invasive)  Goal: Optimal Device Function  Outcome: Ongoing, Progressing     Problem: Inability to Wean (Mechanical Ventilation, Invasive)  Goal: Mechanical Ventilation Liberation  Outcome: Ongoing, Progressing     Problem: Nutrition Impairment (Mechanical Ventilation, Invasive)  Goal: Optimal Nutrition Delivery  Outcome: Ongoing, Progressing     Problem: Skin and Tissue Injury (Mechanical Ventilation, Invasive)  Goal: Absence of Device-Related Skin and Tissue Injury  Outcome: Ongoing, Progressing     Problem: Ventilator-Induced Lung Injury (Mechanical Ventilation, Invasive)  Goal: Absence of Ventilator-Induced Lung Injury  Outcome: Ongoing, Progressing     Problem: Communication Impairment (Artificial Airway)  Goal: Effective Communication  Outcome: Ongoing, Progressing     Problem: Device-Related Complication Risk (Artificial Airway)  Goal: Optimal Device Function  Outcome: Ongoing, Progressing     Problem: Skin and Tissue Injury (Artificial Airway)  Goal: Absence of Device-Related Skin or Tissue Injury  Outcome: Ongoing, Progressing     Problem: Noninvasive Ventilation  Acute  Goal: Effective Unassisted Ventilation and Oxygenation  Outcome: Ongoing, Progressing     Problem: Infection  Goal: Absence of Infection Signs and Symptoms  Outcome: Ongoing, Progressing     Problem: Adjustment to Illness (Sepsis/Septic Shock)  Goal: Optimal Coping  Outcome: Ongoing, Progressing     Problem: Bleeding (Sepsis/Septic Shock)  Goal: Absence of Bleeding  Outcome: Ongoing, Progressing     Problem: Glycemic Control Impaired (Sepsis/Septic Shock)  Goal: Blood Glucose Level Within Desired Range  Outcome: Ongoing, Progressing     Problem: Infection Progression (Sepsis/Septic Shock)  Goal: Absence of Infection Signs and Symptoms  Outcome: Ongoing, Progressing     Problem: Nutrition Impaired (Sepsis/Septic Shock)  Goal: Optimal Nutrition Intake  Outcome: Ongoing, Progressing     Problem: Impaired Wound Healing  Goal: Optimal Wound Healing  Outcome: Ongoing, Progressing     Problem: Skin Injury Risk Increased  Goal: Skin Health and Integrity  Outcome: Ongoing, Progressing     Problem: Fall Injury Risk  Goal: Absence of Fall and Fall-Related Injury  Outcome: Ongoing, Progressing     Problem: Aspiration (Enteral Nutrition)  Goal: Absence of Aspiration Signs and Symptoms  Outcome: Ongoing, Progressing     Problem: Device-Related Complication Risk (Enteral Nutrition)  Goal: Safe, Effective Therapy Delivery  Outcome: Ongoing, Progressing     Problem: Feeding Intolerance (Enteral Nutrition)  Goal: Feeding Tolerance  Outcome: Ongoing, Progressing

## 2022-01-27 NOTE — DISCHARGE INSTRUCTIONS
[facility] Facility Transfer Orders        Admit to: Post acute medical    Diagnoses:   Active Hospital Problems    Diagnosis  POA    *Severe sepsis [A41.9, R65.20]  Yes     Priority: 1 - High    Acute on chronic respiratory failure with hypoxia [J96.21]  Yes     Priority: 2     Ventilator associated pneumonia [J95.851]  Yes     Priority: 4     Cirrhosis of liver [K74.60]  Yes    Difficult Hamilton catheter placement [T83.9XXA]  Yes    Essential hypertension [I10]  Yes    Gastroesophageal reflux disease [K21.9]  Yes    PEG (percutaneous endoscopic gastrostomy) status [Z93.1]  Not Applicable    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  Added automatically from request for surgery [id number]        Resolved Hospital Problems    Diagnosis Date Resolved POA    Cerebral edema [G93.6] 01/24/2022 Yes    Iron deficiency anemia [D50.9] 01/24/2022 Yes     Allergies: Review of patient's allergies indicates:  No Known Allergies    Code Status: DNR    Vitals: Routine       Diet: tube feedings: Continuous nutren 2.0 at 55 mL per hour for 22 hours per day    Activity: Activity as tolerated    Nursing Precautions: Aspiration , Seizure and Pressure ulcer prevention    Bed/Surface: Pressure Redistribution    Consults: Wound Care    Oxygen: Vent Mode: A/CMV-VC  Oxygen Concentration (%):  [30] 30  Resp Rate Total:  [24 br/min-36 br/min] 30 br/min  Vt Set:  [350 mL] 350 mL  PEEP/CPAP:  [4.9 cmH20-6.1 cmH20] 4.9 cmH20  Mean Airway Pressure:  [12.9 cmH20-15.2 cmH20] 13.8 cmH20      Dialysis: Patient is not on dialysis.     Labs:  Daily CBC, CMP x5 days                 Pending Diagnostic  Studies:     None        Imaging: none    Miscellaneous Care:   PEG Care:  Clean site every 24 hours  Hamilton Care: Empty Hamilton bag every shift.  Change Hamilton every month  Drain care- empty qshift, clean site daily  Tracheostomy- clean daily, change dressing qshift    IV Access: Peripheral     Medications: Discontinue all previous medication orders, if any. See new list below.  Current Discharge Medication List      START taking these medications    Details   scopolamine (TRANSDERM-SCOP) 1.3-1.5 mg (1 mg over 3 days) Place 1 patch onto the skin Every 3 (three) days.  Qty: 30 patch, Refills: 0      sodium chloride 0.9% SolP 100 mL with meropenem 1 gram SolR 2 g Inject 2 g into the vein every 8 (eight) hours. for 6 days  Qty: 18 Dose, Refills: 0      vancomycin HCl in 5 % dextrose (VANCOMYCIN IN DEXTROSE 5 %) 1 gram/250 mL Soln Inject 250 mLs (1,000 mg total) into the vein every 24 hours as needed.  Qty: 250 mL, Refills: 0         CONTINUE these medications which have NOT CHANGED    Details   acetaminophen (TYLENOL) 325 MG tablet 2 tablets (650 mg total) by Per G Tube route every 4 (four) hours as needed for Pain or Temperature greater than (100.4F).  Refills: 0      acetylcysteine 100 mg/ml, 10%, (MUCOMYST) 100 mg/mL (10 %) nebulizer solution Take 4 mLs by nebulization every 8 (eight) hours.  Refills: 12      albuterol-ipratropium (DUO-NEB) 2.5 mg-0.5 mg/3 mL nebulizer solution Take 3 mLs by nebulization every 6 (six) hours. Rescue  Qty: 1 Box, Refills: 0      artificial tears (ISOPTO TEARS) 0.5 % ophthalmic solution Place 1 drop into both eyes as needed.      clonazePAM (KLONOPIN) 0.5 MG tablet 1 tablet (0.5 mg total) by Per G Tube route 3 (three) times daily.  Qty: 6 tablet, Refills: 0      ergocalciferol (ERGOCALCIFEROL) 50,000 unit Cap Take 50,000 Units by mouth every 7 days.      ferrous sulfate 300 mg (60 mg iron)/5 mL syrup Take 300 mg by mouth As instructed. Every other day      meropenem-0.9% sodium chloride  500 mg/50 mL PgBk Inject 50 mLs (500 mg total) into the vein every 8 (eight) hours.      midodrine (PROAMATINE) 10 MG tablet 1 tablet (10 mg total) by Per G Tube route 3 (three) times daily.  Qty: 90 tablet, Refills: 11      polyethylene glycol (GLYCOLAX) 17 gram PwPk Take by mouth daily as needed.         STOP taking these medications       vancomycin HCl (VANCOMYCIN 1.25 G/250 ML D5W, READY TO MIX,) Comments:   Reason for Stopping:             Follow up:     Immunizations Administered as of 1/27/2022     No immunizations on file.              Tolu Cross MD

## 2022-01-28 NOTE — DISCHARGE SUMMARY
[facility] Hospital Medicine  Discharge Summary      Patient Name: Genna Felix  MRN: [id number]  Patient Class: IP- Inpatient  Admission Date: 1/24/2022  Hospital Length of Stay: 3 days  Discharge Date and Time: 1/27/2022  1:15 PM  Attending Physician: No att. providers found   Discharging Provider: Tolu Cross MD  Primary Care Provider: Primary Doctor No      HPI:   Patient is a 41-year-old  female with a past medical history significant for anoxic brain injury secondary to cardiac arrest and secondary persistent vegetative state who has chronic PEG/trach status with baseline GCS of approximately 4 who presents to the hospital after recent cholecystostomy tube placement for what appears to be sepsis.  Patient is nonverbal and unable to provide any further history.  Of the patient's outside facility, the patient was noted to be hypothermic.  Patient received vancomycin on the morning of admission, and was transition to the emergency department after hypothermia was noted.  No further history is unable to be elucidated given the patient's nonverbal status.      * No surgery found *      Hospital Course:   Patient is a [place] patient was unfortunately the victim of anoxic brain injury and persistent vegetative state who presented to the hospital with hypothermia.  She underwent evaluation for sepsis and started on broad-spectrum antimicrobial therapy.  She underwent further evaluation cause of, including CT scan of the abdomen as she had a recent cholecystostomy tube, as well as chest x-ray.  Likely etiology of her sepsis was secondary to pneumonia.  She improved with broad-spectrum antimicrobial therapy, and was discharged to long-term acute care to completed full course of 10 days of IV antibiotics.  Goals of care discussion was had with the patient's family, who opted for DNR, but continuation of the current treatment.       Goals of Care Treatment Preferences:  Code Status:  DNR       [place]: Yes           Consults:   Consults (From admission, onward)        Status Ordering Provider     IP consult to case management  Once        Provider:  (Not yet assigned)    Completed SUSHMA RAPP          No new Assessment & Plan notes have been filed under this hospital service since the last note was generated.  Service: Hospital Medicine    Final Active Diagnoses:    Diagnosis Date Noted POA    PRINCIPAL PROBLEM:  Severe sepsis [A41.9, R65.20] 04/22/2021 Yes    Acute on chronic respiratory failure with hypoxia [J96.21] 05/10/2021 Yes    Ventilator associated pneumonia [J95.851] 12/27/2021 Yes    Cirrhosis of liver [K74.60] 12/27/2021 Yes    Difficult Hamilton catheter placement [T83.9XXA] 10/25/2021 Yes    Essential hypertension [I10] 10/24/2021 Yes    Gastroesophageal reflux disease [K21.9] 10/24/2021 Yes    PEG (percutaneous endoscopic gastrostomy) status [Z93.1] 05/01/2021 Not Applicable    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] 05/01/2021 Not Applicable    Goals of care, counseling/discussion [Z71.89] 04/22/2021 Not Applicable    Persistent vegetative state [R40.3] 04/21/2021 Yes    Anemia of chronic disease [D63.8] 04/21/2021 Yes    Deep tissue injury [T14.8XXA] 03/16/2021 Yes    Pressure injury of left buttock, 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] 11/04/2020 Yes      Problems Resolved During this Admission:    Diagnosis Date Noted Date Resolved POA    Cerebral edema [G93.6] 10/24/2021 01/24/2022 Yes    Iron deficiency anemia [D50.9] 11/13/2020 01/24/2022 Yes       Discharged Condition: stable    Disposition: Long Term Acute Care    Follow Up:    Patient Instructions:      Notify your health care provider if you experience any of the following:  temperature >100.4     Notify your health care provider if you experience any of the following:  difficulty breathing or increased cough     Notify your health care provider if you experience any of the following:  increased confusion or weakness     Tube  Feedings/Formulas     Order Specific Question Answer Comments   Select Adult Formula: Nutren 2.0    Route: Gastrostomy    Formula Rate (mL/hr): 50      Activity as tolerated       Significant Diagnostic Studies: Labs:   BMP:   Recent Labs   Lab 01/26/22 0354 01/27/22 0359   GLU 83 86    142   K 4.0 4.3    109   CO2 23 22*   BUN 12 15   CREATININE 0.6 0.5   CALCIUM 9.1 9.2   MG 1.9 1.8    and CBC   Recent Labs   Lab 01/26/22 0354 01/26/22 0354 01/27/22 0359   WBC 3.33*  --  3.98   HGB 8.1*  --  8.8*   HCT 26.9*   < > 29.9*     --  406    < > = values in this interval not displayed.       Pending Diagnostic Studies:     None         Medications:  Reconciled Home Medications:      Medication List      START taking these medications    scopolamine 1.3-1.5 mg (1 mg over 3 days)  Commonly known as: TRANSDERM-SCOP  Place 1 patch onto the skin Every 3 (three) days.  Start taking on: January 29, 2022     sodium chloride 0.9% SolP 100 mL with meropenem 1 gram SolR 2 g  Inject 2 g into the vein every 8 (eight) hours. for 6 days     vancomycin in dextrose 5 % 1 gram/250 mL Soln  Inject 250 mLs (1,000 mg total) into the vein every 24 hours as needed.        CONTINUE taking these medications    acetaminophen 325 MG tablet  Commonly known as: TYLENOL  2 tablets (650 mg total) by Per G Tube route every 4 (four) hours as needed for Pain or Temperature greater than (100.4F).     acetylcysteine 100 mg/ml (10%) 100 mg/mL (10 %) nebulizer solution  Commonly known as: MUCOMYST  Take 4 mLs by nebulization every 8 (eight) hours.     albuterol-ipratropium 2.5 mg-0.5 mg/3 mL nebulizer solution  Commonly known as: DUO-NEB  Take 3 mLs by nebulization every 6 (six) hours. Rescue     artificial tears 0.5 % ophthalmic solution  Commonly known as: ISOPTO TEARS  Place 1 drop into both eyes as needed.     clonazePAM 0.5 MG tablet  Commonly known as: KlonoPIN  1 tablet (0.5 mg total) by Per G Tube route 3 (three) times  daily.     ergocalciferol 50,000 unit Cap  Commonly known as: ERGOCALCIFEROL  Take 50,000 Units by mouth every 7 days.     ferrous sulfate 300 mg (60 mg iron)/5 mL syrup  Take 300 mg by mouth As instructed. Every other day     meropenem-0.9% sodium chloride 500 mg/50 mL Pgbk  Inject 50 mLs (500 mg total) into the vein every 8 (eight) hours.     midodrine 10 MG tablet  Commonly known as: PROAMATINE  1 tablet (10 mg total) by Per G Tube route 3 (three) times daily.     polyethylene glycol 17 gram Pwpk  Commonly known as: GLYCOLAX  Take by mouth daily as needed.        STOP taking these medications    VANCOMYCIN 1.25 G/250 ML D5W (READY TO MIX)            Indwelling Lines/Drains at time of discharge:   Lines/Drains/Airways     Drain                 Gastrostomy/Enterostomy LUQ -- days         Gastrostomy/Enterostomy 07/27/20 549 days         Urethral Catheter 01/04/22 0552 23 days         Biliary Tube 01/13/22 14 days         Open Drain 01/13/22 1015 Right RLQ Other (see comments) 1/4 inch 14 days          Airway                 Surgical Airway [place] Cuffed -- days         Surgical Airway 07/27/20 Shiley Cuffed 549 days                Time spent on the discharge of patient: 37 minutes    Critical care time spent on the evaluation and treatment of severe organ dysfunction, review of pertinent labs and imaging studies, discussions with consulting providers and discussions with patient/family: 39 minutes.     Tolu Cross MD  Department of Hospital Medicine  [facility]

## 2022-01-28 NOTE — HOSPITAL COURSE
Patient is a [place] patient was unfortunately the victim of anoxic brain injury and persistent vegetative state who presented to the hospital with hypothermia.  She underwent evaluation for sepsis and started on broad-spectrum antimicrobial therapy.  She underwent further evaluation cause of, including CT scan of the abdomen as she had a recent cholecystostomy tube, as well as chest x-ray.  Likely etiology of her sepsis was secondary to pneumonia.  She improved with broad-spectrum antimicrobial therapy, and was discharged to long-term acute care to completed full course of 10 days of IV antibiotics.  Goals of care discussion was had with the patient's family, who opted for DNR, but continuation of the current treatment.

## 2022-01-29 LAB
BACTERIA BLD CULT: NORMAL
BACTERIA BLD CULT: NORMAL

## 2022-02-03 NOTE — PHYSICIAN QUERY
PT Name: Genna Felix  MR #: [id number]     DOCUMENTATION CLARIFICATION     CDS/: Mabel Sutherland RN, BSN, CCDS               Contact information:  [email or web address]     This form is a permanent document in the medical record.    Query Date: February 3, 2022    By submitting this query, we are merely seeking further clarification of documentation.  Please utilize your independent clinical judgment when addressing the question(s) below.  The Medical Record contains the following:   Indicators   Supporting Clinical Findings Location in Medical Record   X Pneumonia documented  Patient is currently receiving vancomycin and meropenem at the [facility].    She has a history of ESBL  and MDRO.   Patient appears to have a worsening pneumonia with persistent hypothermia.   Sepsis is a concern.    Patient was givenvancomycin at 10:00 a.m. and I dosed her with Meropenem.     Pneumonia with concern for bacterial etiology due to MRSA and Pseudomonas.     Likely etiology of her sepsis was secondary to pneumonia.   She improved with broad-spectrum antimicrobial therapy, and was discharged to long-term acute care to complete full course of 10 days of IV antibiotics.     1/24 ED MD PN   X Chest X-Ray/CT Scan Impression:  Increasing density of patchy right mid and lower lung field lung infiltrate.    Tracheostomy tube in place.    Impression:  Decreasing right lung infiltrates.    Prominence of both matthew may be due to adenopathy or pulmonary congestion.    Mild cardiomegaly.    Tracheostomy tube in place    1/24 CXR          1/25 CXR    X PaO2    PaCO2     O2 sat    O2 sat 98 - 100% 1/24     O2 sat 99 - 100% 1/25     O2 sat 97 - 100% 1/26 1/24 - 1/27 VS Flowsheet   X WBC    WBC 3.75     WBC 3.33   1/25 Lab  1/26 Lab   X Vital Signs    BP 88/62 > 147/91     HR 61 > 116     RR 20 > 60     T 94.6 O > 99.5 O   1/24 - 1/27 VS Flowsheet   X Cultures     Blood cultures X 2 negative     Urine culture negative   1/24  Microbiology   X Treatment     Current Outpatient Medications     Meropenem 500 mg  IVPB q8h     Vancomycin 1.25 gm  IVPB daily X 7 days          Merrem 2 gm IVPB q8h X 3 days     Vancomycin 1.25 gm IVPB daily X 3 days     Albuterol nebs q6h X 3 days       Reconciled Medications       Merrem 500 mg IVPB q8h     Vancomycin 1.25 gm IVPB daily         Telemetry     Pulse ox     Suctioning      1/24 H&P        1/24 - 1/27 MAR      1/27 DC Summary   X Supplemental O2    30% FIO2 to tracheostomy with A/C ventilation   1/24 - 1/27 VS Flowsheet    Dysphagia/Swallow study      Other       Provider, please provide the diagnosis related to the above clinical indicators:    [   ] MRSA Pneumonia     [   ] Pseudomonas Pneumonia     [  x ] MRSA and Pseudomonas Pneumonia     [   ] Bacterial Pneumonia       [   ] Unspecified Pneumonia       [   ] Other type of pneumonia (please specify): ________       [   ] Other  (please specify): _________     [  ] Clinically undetermined       Please document in your progress notes daily for the duration of treatment, until resolved, and include in your discharge summary.     Form No. [id number]

## 2022-02-03 NOTE — PHYSICIAN QUERY
PT Name: Genna Felix  MR #: [id number]     DOCUMENTATION CLARIFICATION     CDS/: Mabel Sutherland RN, BSN, CCDS               Contact information:  [email or web address]       This form is a permanent document in the medical record.     Query Date: February 3, 2022    By submitting this query, we are merely seeking further clarification of documentation.  Please utilize your independent clinical judgment when addressing the question(s) below.    The Medical Record contains the following:   Indicators   Supporting Clinical Findings Location in Medical Record    Non-blanchable erythema/redness       X Ulcer/Injury/Skin Breakdown Stage II sacral decubitus ulcer top part of the sacrum with stool in diaper     Pressure injury of left buttock, stage 4  Consult wound care    1/24 ED Provider Note    1/24 H&P    Deep Tissue Injury        Wound care consult       X Acute/Chronic Illness Past medical history significant for anoxic brain injury secondary to cardiac arrest and secondary persistent vegetative state who has chronic PEG/trach status with baseline GCS of approximately 4 who presents to the hospital after recent cholecystostomy tube placement for what appears to be sepsis.     1/24 H&P   X Medication/Treatment Turn patient  Sacral protection  Skin assessment  Moisture Management   1/24 Nursing orders    Other       The clinical guidelines noted are only a system guideline. It does not replace the providers clinical judgment.    Per the National Pressure Injury Advisory Panel:   A pressure injury is localized damage to the skin and underlying soft tissue usually over a bony prominence or related to a medical or other device. The injury can present as intact skin or an open ulcer and may be painful. The injury occurs as a result of intense and/or prolonged pressure or pressure in combination with shear. The tolerance of soft tissue for pressure and shear may also be affected by microclimate, nutrition,  perfusion, co-morbidities and condition of the soft tissue.       Stage 2 Pressure Injury:  Partial-thickness loss of skin with exposed dermis. The wound bed is viable, pink or red, moist, and may also present as an intact or ruptured serum-filled blister.        Stage 4 Pressure Injury:  Full-thickness skin and tissue loss with exposed or directly palpable fascia, muscle, tendon, ligament, cartilage or bone in the ulcer. Slough and/or eschar may be visible. Epibole (rolled edges), undermining and/or tunneling often occur.          Provider, please clarify conflicting location and stage of pressure injury:     [ x  ] Pressure Injury/Decubitus Ulcer, Sacrum, Stage 2       [   ] Pressure Injury/Decubitus Ulcer, Sacrum, Stage 4       [   ] Pressure Injury/Decubitus Ulcer, Left Buttock Stage 2       [ x  ] Pressure Injury/Decubitus Ulcer, Left Buttock Stage 4       [   ] Other Integumentary Diagnosis (please specify):_______________       [  ] Clinically Undetermined             Please document in your progress notes daily for the duration of treatment until resolved and include in your discharge summary.    Reference:    COLLIN Badillo., ERNESTO Senior., Goldberg, M., RAUDEL Pantoja., RAUDEL Ramos., & AME Hernandez. (2016). Revised National Pressure Ulcer Advisory Panel Pressure Injury Staging System: Revised Pressure Injury Staging System. J Wound Ostomy Continence Nurs, 43(6), 585-597. doi:10.1097/won.[email or web address]    Form No.[id number]

## 2022-02-04 NOTE — PHYSICIAN QUERY
PT Name: Genna Felix  MR #: [id number]     DOCUMENTATION CLARIFICATION     CDS/: Mabel Sutherland RN, BSN, CCDS               Contact information:  [email or web address]     This form is a permanent document in the medical record.     Query Date: February 4, 2022    By submitting this query, we are merely seeking further clarification of documentation.  Please utilize your independent clinical judgment when addressing the question(s) below.  The Medical Record contains the following   Indicators   Supporting Clinical Findings Location in Medical Record   X Documentation of Respiratory Failure, ARDS Acute on chronic respiratory failure with hypoxia    Patient with Hypoxic Respiratory failure which is Chronic .    She is chronically on a ventilator.  Continue treatment for chronic respiratory failure   1/26 HM PN   X SOB, MENDEZ, Wheezing, Productive Cough, Use of Accessory Muscles, etc. S/S of respiratory failure include increased work of breathing and respiratory distress  Wheezing and rhonchi present.  Breath sounds coarse on ventilator.     1/26 HM PN   X RR     ABGs     O2 sat O2 sat  96 - 100%  on 30% FIO2 per vent     RR 24-30    O2 sat  97 - 100% on 30% FIO2 per vent      RR 15 - 80    O2 sat 100% on 30% FIO2 per vent              RR 24 - 43    1/25 Flowsheet    1/26 FLowsheet    1/27 Flowsheet    Hypoxia/Hypercapnia         X BiPAP/Intubation/Mechanical Ventilation She is chronically on a ventilator.    [facility] Vent:  A/C 350/ 30%/ 24     1/26 HM PN    1/25-27 Flowsheet   X Supplemental O2 O2 @ 30% 1/27 DC Summary     X Home O2, Oxygen Dependence She is chronically on a ventilator. 1/26 HM PN     X Respiratory distress  S/S of respiratory failure include increased work of breathing and respiratory distress          1/26 HM PN     X Radiology findings Impression:  Increasing density of patchy right mid and lower lung field lung infiltrate.   Tracheostomy tube in place.     1/24 CXR       X Acute/Chronic  Illness PMH: Anoxic brain injury and persistent vegetative state who presented to the hospital with hypothermia  Chronic PEG/Trach status  Severe sepsis  Vent associated PNA  AOC Respiratory failure hypoxia    Patient with Hypoxic Respiratory failure which is Chronic .    She is chronically on a ventilator.   1/27 DC summary          1/26 Hosp Med MD PN   X Treatment O2  CXR  Mechanical Ventilation  Albuterol Nebs  Suctioning  Turning   1/25 NSG orders    Other         The noted clinical guidelines following a diagnosis are only system guidelines and do not replace the providers clinical judgment.    Provider, please clarify the diagnosis of Acute on Chronic Respiratory Failure with Hypoxia:    [  x  ] Acute and (on) Chronic Respiratory Failure with Hypoxia    (Hypoxic: pO2 >10 mmHg below baseline or SpO2 < 91% on usual home O2 or O2 ? 2L/min over baseline home O2 and respiratory symptoms documented)    Specify clinical criteria/indicators for the diagnosis of Acute Respiratory Failure (please specify) _______Increased work of breathing and resp distress___________       [    ] Chronic Respiratory Failure with Hypoxia Only   (Vent Dependent)        [    ] Other clarification (please specify): ___________________       [   ] Clinically Undetermined         Please document in your progress notes daily for the duration of treatment until resolved and include in your discharge summary.     Reference:    DANIELLE Reed MD. (2020, March 13). Acute respiratory distress syndrome: Clinical features, diagnosis, and complications in adults ([id number] YOSSI Giles MD & [id number] SOILA Ponce MD, Eds.). Retrieved November 13, 2020, from [email or web address]-[email or web address]-distress-[email or web address]-features-diagnosis-[email or web address]-[email or web address]?search=ards&source=search_result&selectedTitle=1~150&usage_type=default&display_rank=1  Form No. [id number]

## 2022-02-24 NOTE — PLAN OF CARE
Intervention: enteral nutrition therapy, collaboration with care providers      Recommendation:  1) Restart TF when medically able - same as TF PTA   Isosource 1.5 @ 20 ml/hr advancing to 50 ml/hr + 110 ml flush q 4 hr + beneprotein BID +Benny BID   ( provides 1800 kcal ( > 100% EEN), 81 g protein + bene ( 94% EPN), 912 ml free water)    2) If unable to retstart TF in 2-3 days consider PPN  D 5 AA 4.25 @ 75 ml/hr + standard lipids ( 1112 kcal, 76 g protein)     3) weigh weekly     Goals: 1) TF advanced in < 4 days 2) TF meeting > 50% of needs at f/u  Nutrition Goal Status: was meeting/ new  Communication of RD Recs: POC, sticky note   Consent 3/Introductory Paragraph: I gave the patient a chance to ask questions they had about the procedure.  Following this I explained the Mohs procedure and consent was obtained. The risks, benefits and alternatives to therapy were discussed in detail. Specifically, the risks of infection, scarring, bleeding, prolonged wound healing, incomplete removal, allergy to anesthesia, nerve injury and recurrence were addressed. Prior to the procedure, the treatment site was clearly identified and confirmed by the patient. All components of Universal Protocol/PAUSE Rule completed.

## 2022-03-17 ENCOUNTER — HOSPITAL ENCOUNTER (INPATIENT)
Facility: HOSPITAL | Age: 62
LOS: 8 days | Discharge: HOME | DRG: 870 | End: 2022-03-25
Attending: INTERNAL MEDICINE | Admitting: INTERNAL MEDICINE
Payer: MEDICARE

## 2022-03-17 DIAGNOSIS — L89.014: ICD-10-CM

## 2022-03-17 DIAGNOSIS — Z93.0 TRACHEOSTOMY DEPENDENCE: ICD-10-CM

## 2022-03-17 DIAGNOSIS — J96.11 CHRONIC RESPIRATORY FAILURE WITH HYPOXIA: ICD-10-CM

## 2022-03-17 DIAGNOSIS — J95.851 VAP (VENTILATOR-ASSOCIATED PNEUMONIA): ICD-10-CM

## 2022-03-17 DIAGNOSIS — K94.23 LEAKING PEG TUBE: Primary | ICD-10-CM

## 2022-03-17 DIAGNOSIS — K74.60 HEPATIC CIRRHOSIS, UNSPECIFIED HEPATIC CIRRHOSIS TYPE, UNSPECIFIED WHETHER ASCITES PRESENT: ICD-10-CM

## 2022-03-17 DIAGNOSIS — I33.0 SBE (SUBACUTE BACTERIAL ENDOCARDITIS): ICD-10-CM

## 2022-03-17 DIAGNOSIS — R40.3 PERSISTENT VEGETATIVE STATE: ICD-10-CM

## 2022-03-17 DIAGNOSIS — T68.XXXA HYPOTHERMIA: ICD-10-CM

## 2022-03-17 DIAGNOSIS — D63.8 ANEMIA OF CHRONIC DISEASE: ICD-10-CM

## 2022-03-17 DIAGNOSIS — R78.81 BACTEREMIA: ICD-10-CM

## 2022-03-17 DIAGNOSIS — A41.9 SEPSIS, DUE TO UNSPECIFIED ORGANISM, UNSPECIFIED WHETHER ACUTE ORGAN DYSFUNCTION PRESENT: ICD-10-CM

## 2022-03-17 DIAGNOSIS — J18.9 PNEUMONIA OF RIGHT LOWER LOBE DUE TO INFECTIOUS ORGANISM: ICD-10-CM

## 2022-03-17 LAB
ALBUMIN SERPL BCP-MCNC: 3.1 G/DL (ref 3.5–5.2)
ALP SERPL-CCNC: 249 U/L (ref 55–135)
ALT SERPL W/O P-5'-P-CCNC: 98 U/L (ref 10–44)
ANION GAP SERPL CALC-SCNC: 10 MMOL/L (ref 8–16)
AST SERPL-CCNC: 54 U/L (ref 10–40)
BACTERIA #/AREA URNS HPF: ABNORMAL /HPF
BASOPHILS # BLD AUTO: 0.01 K/UL (ref 0–0.2)
BASOPHILS NFR BLD: 0.3 % (ref 0–1.9)
BILIRUB SERPL-MCNC: 1 MG/DL (ref 0.1–1)
BILIRUB UR QL STRIP: NEGATIVE
BUN SERPL-MCNC: 25 MG/DL (ref 6–20)
CALCIUM SERPL-MCNC: 9.9 MG/DL (ref 8.7–10.5)
CHLORIDE SERPL-SCNC: 103 MMOL/L (ref 95–110)
CLARITY UR: CLEAR
CO2 SERPL-SCNC: 22 MMOL/L (ref 23–29)
COLOR UR: YELLOW
CREAT SERPL-MCNC: 0.5 MG/DL (ref 0.5–1.4)
DIFFERENTIAL METHOD: ABNORMAL
EOSINOPHIL # BLD AUTO: 0.2 K/UL (ref 0–0.5)
EOSINOPHIL NFR BLD: 4 % (ref 0–8)
ERYTHROCYTE [DISTWIDTH] IN BLOOD BY AUTOMATED COUNT: 16.3 % (ref 11.5–14.5)
EST. GFR  (AFRICAN AMERICAN): >60 ML/MIN/1.73 M^2
EST. GFR  (NON AFRICAN AMERICAN): >60 ML/MIN/1.73 M^2
GLUCOSE SERPL-MCNC: 82 MG/DL (ref 70–110)
GLUCOSE UR QL STRIP: NEGATIVE
HCT VFR BLD AUTO: 37.3 % (ref 37–48.5)
HGB BLD-MCNC: 11.3 G/DL (ref 12–16)
HGB UR QL STRIP: ABNORMAL
HYALINE CASTS #/AREA URNS LPF: 0 /LPF
IMM GRANULOCYTES # BLD AUTO: 0.01 K/UL (ref 0–0.04)
IMM GRANULOCYTES NFR BLD AUTO: 0.3 % (ref 0–0.5)
INR PPP: 1.1 (ref 0.8–1.2)
KETONES UR QL STRIP: NEGATIVE
LACTATE SERPL-SCNC: 1 MMOL/L (ref 0.5–2.2)
LACTATE SERPL-SCNC: 1 MMOL/L (ref 0.5–2.2)
LEUKOCYTE ESTERASE UR QL STRIP: ABNORMAL
LYMPHOCYTES # BLD AUTO: 0.6 K/UL (ref 1–4.8)
LYMPHOCYTES NFR BLD: 16.9 % (ref 18–48)
MAGNESIUM SERPL-MCNC: 1.8 MG/DL (ref 1.6–2.6)
MCH RBC QN AUTO: 25.9 PG (ref 27–31)
MCHC RBC AUTO-ENTMCNC: 30.3 G/DL (ref 32–36)
MCV RBC AUTO: 86 FL (ref 82–98)
MICROSCOPIC COMMENT: ABNORMAL
MONOCYTES # BLD AUTO: 0.2 K/UL (ref 0.3–1)
MONOCYTES NFR BLD: 6.2 % (ref 4–15)
NEUTROPHILS # BLD AUTO: 2.7 K/UL (ref 1.8–7.7)
NEUTROPHILS NFR BLD: 72.3 % (ref 38–73)
NITRITE UR QL STRIP: NEGATIVE
NRBC BLD-RTO: 0 /100 WBC
PH UR STRIP: 8 [PH] (ref 5–8)
PLATELET # BLD AUTO: 264 K/UL (ref 150–450)
PMV BLD AUTO: 10.9 FL (ref 9.2–12.9)
POCT GLUCOSE: 112 MG/DL (ref 70–110)
POCT GLUCOSE: 117 MG/DL (ref 70–110)
POTASSIUM SERPL-SCNC: 4.6 MMOL/L (ref 3.5–5.1)
PROCALCITONIN SERPL IA-MCNC: 0.04 NG/ML
PROT SERPL-MCNC: 8.7 G/DL (ref 6–8.4)
PROT UR QL STRIP: ABNORMAL
PROTHROMBIN TIME: 11.9 SEC (ref 9–12.5)
RBC # BLD AUTO: 4.36 M/UL (ref 4–5.4)
RBC #/AREA URNS HPF: 50 /HPF (ref 0–4)
SARS-COV-2 RDRP RESP QL NAA+PROBE: NEGATIVE
SODIUM SERPL-SCNC: 135 MMOL/L (ref 136–145)
SP GR UR STRIP: 1.02 (ref 1–1.03)
URN SPEC COLLECT METH UR: ABNORMAL
UROBILINOGEN UR STRIP-ACNC: ABNORMAL EU/DL
WBC # BLD AUTO: 3.72 K/UL (ref 3.9–12.7)
WBC #/AREA URNS HPF: 5 /HPF (ref 0–5)

## 2022-03-17 PROCEDURE — 94761 N-INVAS EAR/PLS OXIMETRY MLT: CPT

## 2022-03-17 PROCEDURE — 25000242 PHARM REV CODE 250 ALT 637 W/ HCPCS: Performed by: INTERNAL MEDICINE

## 2022-03-17 PROCEDURE — 85610 PROTHROMBIN TIME: CPT | Performed by: EMERGENCY MEDICINE

## 2022-03-17 PROCEDURE — 63600175 PHARM REV CODE 636 W HCPCS: Performed by: NURSE PRACTITIONER

## 2022-03-17 PROCEDURE — 85025 COMPLETE CBC W/AUTO DIFF WBC: CPT | Performed by: EMERGENCY MEDICINE

## 2022-03-17 PROCEDURE — 99291 CRITICAL CARE FIRST HOUR: CPT | Mod: 25

## 2022-03-17 PROCEDURE — 93005 ELECTROCARDIOGRAM TRACING: CPT

## 2022-03-17 PROCEDURE — 87186 SC STD MICRODIL/AGAR DIL: CPT | Performed by: EMERGENCY MEDICINE

## 2022-03-17 PROCEDURE — 99900035 HC TECH TIME PER 15 MIN (STAT)

## 2022-03-17 PROCEDURE — 87186 SC STD MICRODIL/AGAR DIL: CPT | Mod: 59 | Performed by: INTERNAL MEDICINE

## 2022-03-17 PROCEDURE — 27000221 HC OXYGEN, UP TO 24 HOURS

## 2022-03-17 PROCEDURE — 87077 CULTURE AEROBIC IDENTIFY: CPT | Mod: 59 | Performed by: INTERNAL MEDICINE

## 2022-03-17 PROCEDURE — 87070 CULTURE OTHR SPECIMN AEROBIC: CPT | Performed by: INTERNAL MEDICINE

## 2022-03-17 PROCEDURE — 83735 ASSAY OF MAGNESIUM: CPT | Performed by: EMERGENCY MEDICINE

## 2022-03-17 PROCEDURE — 87077 CULTURE AEROBIC IDENTIFY: CPT | Performed by: EMERGENCY MEDICINE

## 2022-03-17 PROCEDURE — 25000003 PHARM REV CODE 250: Performed by: EMERGENCY MEDICINE

## 2022-03-17 PROCEDURE — 25000003 PHARM REV CODE 250: Performed by: INTERNAL MEDICINE

## 2022-03-17 PROCEDURE — 99900026 HC AIRWAY MAINTENANCE (STAT)

## 2022-03-17 PROCEDURE — 99291 CRITICAL CARE FIRST HOUR: CPT | Mod: ,,, | Performed by: INTERNAL MEDICINE

## 2022-03-17 PROCEDURE — 93010 EKG 12-LEAD: ICD-10-PCS | Mod: ,,, | Performed by: SPECIALIST

## 2022-03-17 PROCEDURE — 96360 HYDRATION IV INFUSION INIT: CPT

## 2022-03-17 PROCEDURE — 20000000 HC ICU ROOM

## 2022-03-17 PROCEDURE — 36573 INSJ PICC RS&I 5 YR+: CPT

## 2022-03-17 PROCEDURE — 63600175 PHARM REV CODE 636 W HCPCS: Performed by: EMERGENCY MEDICINE

## 2022-03-17 PROCEDURE — 94002 VENT MGMT INPAT INIT DAY: CPT

## 2022-03-17 PROCEDURE — 63600175 PHARM REV CODE 636 W HCPCS: Performed by: INTERNAL MEDICINE

## 2022-03-17 PROCEDURE — 94640 AIRWAY INHALATION TREATMENT: CPT

## 2022-03-17 PROCEDURE — 99291 PR CRITICAL CARE, E/M 30-74 MINUTES: ICD-10-PCS | Mod: ,,, | Performed by: INTERNAL MEDICINE

## 2022-03-17 PROCEDURE — 80053 COMPREHEN METABOLIC PANEL: CPT | Performed by: EMERGENCY MEDICINE

## 2022-03-17 PROCEDURE — A4216 STERILE WATER/SALINE, 10 ML: HCPCS | Performed by: INTERNAL MEDICINE

## 2022-03-17 PROCEDURE — 84145 PROCALCITONIN (PCT): CPT | Performed by: EMERGENCY MEDICINE

## 2022-03-17 PROCEDURE — 83605 ASSAY OF LACTIC ACID: CPT | Mod: 91 | Performed by: EMERGENCY MEDICINE

## 2022-03-17 PROCEDURE — C1751 CATH, INF, PER/CENT/MIDLINE: HCPCS

## 2022-03-17 PROCEDURE — 93010 ELECTROCARDIOGRAM REPORT: CPT | Mod: ,,, | Performed by: SPECIALIST

## 2022-03-17 PROCEDURE — U0002 COVID-19 LAB TEST NON-CDC: HCPCS | Performed by: EMERGENCY MEDICINE

## 2022-03-17 PROCEDURE — 36415 COLL VENOUS BLD VENIPUNCTURE: CPT | Performed by: EMERGENCY MEDICINE

## 2022-03-17 PROCEDURE — 87040 BLOOD CULTURE FOR BACTERIA: CPT | Performed by: EMERGENCY MEDICINE

## 2022-03-17 PROCEDURE — 87205 SMEAR GRAM STAIN: CPT | Performed by: INTERNAL MEDICINE

## 2022-03-17 PROCEDURE — 81000 URINALYSIS NONAUTO W/SCOPE: CPT | Performed by: EMERGENCY MEDICINE

## 2022-03-17 PROCEDURE — P9612 CATHETERIZE FOR URINE SPEC: HCPCS

## 2022-03-17 RX ORDER — SODIUM CHLORIDE 0.9 % (FLUSH) 0.9 %
10 SYRINGE (ML) INJECTION
Status: DISCONTINUED | OUTPATIENT
Start: 2022-03-17 | End: 2022-03-26 | Stop reason: HOSPADM

## 2022-03-17 RX ORDER — MORPHINE SULFATE 4 MG/ML
3 INJECTION, SOLUTION INTRAMUSCULAR; INTRAVENOUS EVERY 4 HOURS PRN
Status: DISCONTINUED | OUTPATIENT
Start: 2022-03-17 | End: 2022-03-26 | Stop reason: HOSPADM

## 2022-03-17 RX ORDER — ENOXAPARIN SODIUM 100 MG/ML
40 INJECTION SUBCUTANEOUS EVERY 24 HOURS
Status: DISCONTINUED | OUTPATIENT
Start: 2022-03-17 | End: 2022-03-26 | Stop reason: HOSPADM

## 2022-03-17 RX ORDER — NALOXONE HCL 0.4 MG/ML
0.02 VIAL (ML) INJECTION
Status: DISCONTINUED | OUTPATIENT
Start: 2022-03-17 | End: 2022-03-26 | Stop reason: HOSPADM

## 2022-03-17 RX ORDER — SODIUM CHLORIDE 0.9 % (FLUSH) 0.9 %
10 SYRINGE (ML) INJECTION EVERY 6 HOURS
Status: DISCONTINUED | OUTPATIENT
Start: 2022-03-17 | End: 2022-03-26 | Stop reason: HOSPADM

## 2022-03-17 RX ORDER — POLYETHYLENE GLYCOL 3350 17 G/17G
17 POWDER, FOR SOLUTION ORAL 2 TIMES DAILY PRN
Status: DISCONTINUED | OUTPATIENT
Start: 2022-03-17 | End: 2022-03-26 | Stop reason: HOSPADM

## 2022-03-17 RX ORDER — IBUPROFEN 200 MG
24 TABLET ORAL
Status: DISCONTINUED | OUTPATIENT
Start: 2022-03-17 | End: 2022-03-26 | Stop reason: HOSPADM

## 2022-03-17 RX ORDER — MIDODRINE HYDROCHLORIDE 5 MG/1
10 TABLET ORAL 3 TIMES DAILY
Status: DISCONTINUED | OUTPATIENT
Start: 2022-03-17 | End: 2022-03-26 | Stop reason: HOSPADM

## 2022-03-17 RX ORDER — ACETAMINOPHEN 650 MG/20.3ML
650 LIQUID ORAL EVERY 6 HOURS PRN
Status: DISCONTINUED | OUTPATIENT
Start: 2022-03-17 | End: 2022-03-26 | Stop reason: HOSPADM

## 2022-03-17 RX ORDER — ACETYLCYSTEINE 100 MG/ML
4 SOLUTION ORAL; RESPIRATORY (INHALATION) EVERY 8 HOURS
Status: DISCONTINUED | OUTPATIENT
Start: 2022-03-17 | End: 2022-03-17

## 2022-03-17 RX ORDER — MUPIROCIN 20 MG/G
OINTMENT TOPICAL 2 TIMES DAILY
Status: COMPLETED | OUTPATIENT
Start: 2022-03-17 | End: 2022-03-22

## 2022-03-17 RX ORDER — INSULIN ASPART 100 [IU]/ML
0-5 INJECTION, SOLUTION INTRAVENOUS; SUBCUTANEOUS
Status: DISCONTINUED | OUTPATIENT
Start: 2022-03-17 | End: 2022-03-26 | Stop reason: HOSPADM

## 2022-03-17 RX ORDER — GLUCAGON 1 MG
1 KIT INJECTION
Status: DISCONTINUED | OUTPATIENT
Start: 2022-03-17 | End: 2022-03-26 | Stop reason: HOSPADM

## 2022-03-17 RX ORDER — IPRATROPIUM BROMIDE AND ALBUTEROL SULFATE 2.5; .5 MG/3ML; MG/3ML
3 SOLUTION RESPIRATORY (INHALATION) EVERY 6 HOURS
Status: DISCONTINUED | OUTPATIENT
Start: 2022-03-17 | End: 2022-03-17

## 2022-03-17 RX ORDER — IBUPROFEN 200 MG
16 TABLET ORAL
Status: DISCONTINUED | OUTPATIENT
Start: 2022-03-17 | End: 2022-03-26 | Stop reason: HOSPADM

## 2022-03-17 RX ORDER — ACETYLCYSTEINE 100 MG/ML
4 SOLUTION ORAL; RESPIRATORY (INHALATION) EVERY 6 HOURS
Status: DISCONTINUED | OUTPATIENT
Start: 2022-03-17 | End: 2022-03-23

## 2022-03-17 RX ORDER — DEXTROSE MONOHYDRATE AND SODIUM CHLORIDE 5; .9 G/100ML; G/100ML
INJECTION, SOLUTION INTRAVENOUS CONTINUOUS
Status: DISCONTINUED | OUTPATIENT
Start: 2022-03-17 | End: 2022-03-23

## 2022-03-17 RX ORDER — IPRATROPIUM BROMIDE AND ALBUTEROL SULFATE 2.5; .5 MG/3ML; MG/3ML
3 SOLUTION RESPIRATORY (INHALATION) EVERY 6 HOURS
Status: DISCONTINUED | OUTPATIENT
Start: 2022-03-17 | End: 2022-03-23

## 2022-03-17 RX ORDER — SCOLOPAMINE TRANSDERMAL SYSTEM 1 MG/1
1 PATCH, EXTENDED RELEASE TRANSDERMAL
Status: DISCONTINUED | OUTPATIENT
Start: 2022-03-17 | End: 2022-03-26 | Stop reason: HOSPADM

## 2022-03-17 RX ORDER — ONDANSETRON 2 MG/ML
4 INJECTION INTRAMUSCULAR; INTRAVENOUS EVERY 6 HOURS PRN
Status: DISCONTINUED | OUTPATIENT
Start: 2022-03-17 | End: 2022-03-26 | Stop reason: HOSPADM

## 2022-03-17 RX ADMIN — MEROPENEM 2 G: 1 INJECTION, POWDER, FOR SOLUTION INTRAVENOUS at 11:03

## 2022-03-17 RX ADMIN — IPRATROPIUM BROMIDE AND ALBUTEROL SULFATE 3 ML: 2.5; .5 SOLUTION RESPIRATORY (INHALATION) at 07:03

## 2022-03-17 RX ADMIN — VANCOMYCIN HYDROCHLORIDE 1250 MG: 1.25 INJECTION, POWDER, LYOPHILIZED, FOR SOLUTION INTRAVENOUS at 11:03

## 2022-03-17 RX ADMIN — ONDANSETRON 4 MG: 2 INJECTION INTRAMUSCULAR; INTRAVENOUS at 09:03

## 2022-03-17 RX ADMIN — Medication 125 MG: at 10:03

## 2022-03-17 RX ADMIN — IPRATROPIUM BROMIDE AND ALBUTEROL SULFATE 3 ML: 2.5; .5 SOLUTION RESPIRATORY (INHALATION) at 12:03

## 2022-03-17 RX ADMIN — SODIUM CHLORIDE, SODIUM LACTATE, POTASSIUM CHLORIDE, AND CALCIUM CHLORIDE 1974 ML: .6; .31; .03; .02 INJECTION, SOLUTION INTRAVENOUS at 09:03

## 2022-03-17 RX ADMIN — DEXTROSE AND SODIUM CHLORIDE: 5; .9 INJECTION, SOLUTION INTRAVENOUS at 01:03

## 2022-03-17 RX ADMIN — ACETAMINOPHEN 650 MG: 650 SOLUTION ORAL at 08:03

## 2022-03-17 RX ADMIN — MIDODRINE HYDROCHLORIDE 10 MG: 5 TABLET ORAL at 04:03

## 2022-03-17 RX ADMIN — SODIUM CHLORIDE, PRESERVATIVE FREE 10 ML: 5 INJECTION INTRAVENOUS at 08:03

## 2022-03-17 RX ADMIN — ENOXAPARIN SODIUM 40 MG: 40 INJECTION SUBCUTANEOUS at 04:03

## 2022-03-17 RX ADMIN — ACETYLCYSTEINE 4 ML: 100 INHALANT RESPIRATORY (INHALATION) at 01:03

## 2022-03-17 RX ADMIN — TRAZODONE HYDROCHLORIDE 25 MG: 50 TABLET ORAL at 10:03

## 2022-03-17 RX ADMIN — MORPHINE SULFATE 3 MG: 4 INJECTION INTRAVENOUS at 08:03

## 2022-03-17 RX ADMIN — MEROPENEM 2 G: 1 INJECTION, POWDER, FOR SOLUTION INTRAVENOUS at 08:03

## 2022-03-17 RX ADMIN — MIDODRINE HYDROCHLORIDE 10 MG: 5 TABLET ORAL at 08:03

## 2022-03-17 RX ADMIN — SCOPALAMINE 1 PATCH: 1 PATCH, EXTENDED RELEASE TRANSDERMAL at 04:03

## 2022-03-17 RX ADMIN — ACETYLCYSTEINE 4 ML: 100 INHALANT RESPIRATORY (INHALATION) at 07:03

## 2022-03-17 NOTE — ED NOTES
Placed rectum temperature secured with Mepore tape. Applied mexpilex dressing to sacral area to pI prevention and provided pericare. Cleaned stage 4 PI to right elbow, applied wet to dry dressing, covered with border dressing and secureed with mepore tape. Lezama boots in place.

## 2022-03-17 NOTE — PROCEDURES
Genna Felix is a 61 y.o. female patient.    Temp: (!) 94.28 °F (34.6 °C) (03/17/22 1508)  Pulse: 107 (03/17/22 1508)  Resp: (!) 23 (03/17/22 1508)  BP: (!) 96/55 (03/17/22 1430)  SpO2: 97 % (03/17/22 1508)  Weight: 74.5 kg (164 lb 3.9 oz) (03/17/22 1300)  Height: 5' (152.4 cm) (03/17/22 1300)    PICC  Date/Time: 3/17/2022 2:30 PM  Location procedure was performed: [facility] ICU  Performed by: Kristina Preciado RN  Supervising provider: Emelina Alan RN  Pre-operative Diagnosis: hypothermia  Consent Done: Yes  Time out: Immediately prior to procedure a time out was called to verify the correct patient, procedure, equipment, support staff and site/side marked as required  Indications: vascular access  Anesthesia: local infiltration  Local anesthetic: lidocaine 1% without epinephrine  Anesthetic Total (mL): 2  Preparation: skin prepped with ChloraPrep  Skin prep agent dried: skin prep agent completely dried prior to procedure  Sterile barriers: all five maximum sterile barriers used - cap, mask, sterile gown, sterile gloves, and large sterile sheet  Hand hygiene: hand hygiene performed prior to central venous catheter insertion  Location details: right cephalic  Catheter type: triple lumen  Catheter size: 5 Fr  Catheter Length: 37cm    Ultrasound guidance: yes  Vessel Caliber: medium and patent, compressibility normal  Vascular Doppler: not done  Needle advanced into vessel with real time Ultrasound guidance.  Guidewire confirmed in vessel.  Image recorded and saved.  Sterile sheath used.  no esophageal manometryNumber of attempts: 1  Post-procedure: blood return through all ports, chlorhexidine patch and sterile dressing applied  Technical procedures used: LORI; Viviana 3CG  Estimated blood loss (mL): 0    Assessment: placement verified by x-ray, no pneumothorax on x-ray, successful placement and tip termination  Tip Termination Explanation: Tip in SVC; successful placement  Complications: none          Name  OC  3/17/2022

## 2022-03-17 NOTE — ED PROVIDER NOTES
"Encounter Date: 3/17/2022    SCRIBE #1 NOTE: I, Marisa Branham, am scribing for, and in the presence of, Terry Nino DO.       History     Chief Complaint   Patient presents with    hypothermia     Pt sent by [facility] for hypothermia, nursing home staff reported to EMS [id number] pt warming blanket was not working     Time seen by provider: 8:10 AM on 03/17/2022    Genna Felix is a 61 y.o. female who presents to the ED via EMS from [place] with an onset of hypothermia.  Patient was atop of a thermal blanket in order to regulate her temperature, but the blanket malfunctioned throughout the night causing patient's temperature to drop.  She presented to the ED with a rectal temp of 89 °F.  EMS notes the patient has a "blood shot" left eye and a steady "upward gaze."  Patient presents with a drain to the RUQ, booties to bilateral feet, trach in place and Hamilton catheter, per EMS.  They note patient is a "full code" and responds with a jump to startling noises only.  No other symptoms reported at this time.  Patient has a Hx of respiratory distress, hemangioma of intra-abdominal structure, tracheostomy dependence, anoxic brain damage, PE, HTN, protein claorie malnutrition, PEG tube placement, bed bound, total self-care deficit, nursing home resident, and cardiac arrest.  PSHx includes PEG with tracheostomy placement.  HPI is limited secondary to nonverbal patient.      The history is provided by the EMS personnel.     Review of patient's allergies indicates:  No Known Allergies  Past Medical History:   Diagnosis Date    Anoxic brain damage 07/2020    Bedbound 07/2020    Cardiac arrest 07/2020    Cirrhosis     GERD (gastroesophageal reflux disease)     Hemangioma of intra-abdominal structure     Hypertension     Nursing home resident     Protein calorie malnutrition     Pulmonary embolus     Respiratory distress     S/P percutaneous endoscopic gastrostomy (PEG) tube placement 07/2020    Total self-care deficit " 07/2020    Tracheostomy dependence     7/2020     Past Surgical History:   Procedure Laterality Date    PEG W/TRACHEOSTOMY PLACEMENT  07/27/2020    SKIN GRAFT      buttocks     Family History   Problem Relation Age of Onset    No Known Problems Mother     No Known Problems Father      Social History     Tobacco Use    Smoking status: Never Smoker    Smokeless tobacco: Never Used   Substance Use Topics    Alcohol use: Not Currently    Drug use: Not Currently     Review of Systems   Unable to perform ROS: Patient nonverbal   Constitutional:        Positive for hypothermia.        Physical Exam     Initial Vitals   BP Pulse Resp Temp SpO2   03/17/22 0842 03/17/22 0833 03/17/22 0833 03/17/22 0817 03/17/22 0833   114/61 (!) 58 (!) 22 (!) 89 °F (31.7 °C) 99 %      MAP       --                Physical Exam    Nursing note and vitals reviewed.  Constitutional: She appears well-developed and well-nourished. She is not diaphoretic. No distress.   HENT:   Head: Normocephalic and atraumatic.   Right Ear: External ear normal.   Left Ear: External ear normal.   Nose: Nose normal.   Eyes: Conjunctivae and EOM are normal. Pupils are equal, round, and reactive to light.   Neck: Neck supple.   Tracheostomy in neck.     Normal range of motion.  Cardiovascular: Normal rate, regular rhythm and normal heart sounds.   Pulmonary/Chest: Breath sounds normal. No respiratory distress. She has no wheezes. She has no rhonchi. She has no rales.   Abdominal: There is no abdominal tenderness.   Drain to RUQ region.     Musculoskeletal:         General: No tenderness or edema. Normal range of motion.      Cervical back: Normal range of motion and neck supple.      Comments: Booties on bilateral feet.  Bandage to right elbow.       Lymphadenopathy:     She has no cervical adenopathy.   Neurological: She is alert and oriented to person, place, and time. She has normal strength. No cranial nerve deficit or sensory deficit.   Contractures in  bilateral wrist.  GCS of 3.     Skin: Skin is warm and dry. No rash and no abscess noted. No erythema.   Psychiatric: She has a normal mood and affect.         ED Course   Procedures  Labs Reviewed   CBC W/ AUTO DIFFERENTIAL - Abnormal; Notable for the following components:       Result Value    WBC 3.72 (*)     Hemoglobin 11.3 (*)     MCH 25.9 (*)     MCHC 30.3 (*)     RDW 16.3 (*)     Lymph # 0.6 (*)     Mono # 0.2 (*)     Lymph % 16.9 (*)     All other components within normal limits   COMPREHENSIVE METABOLIC PANEL - Abnormal; Notable for the following components:    Sodium 135 (*)     CO2 22 (*)     BUN 25 (*)     Total Protein 8.7 (*)     Albumin 3.1 (*)     Alkaline Phosphatase 249 (*)     AST 54 (*)     ALT 98 (*)     All other components within normal limits   URINALYSIS, REFLEX TO URINE CULTURE - Abnormal; Notable for the following components:    Protein, UA 3+ (*)     Occult Blood UA 3+ (*)     Urobilinogen, UA 4.0-6.0 (*)     Leukocytes, UA 1+ (*)     All other components within normal limits    Narrative:     Specimen Source->Urine   URINALYSIS MICROSCOPIC - Abnormal; Notable for the following components:    RBC, UA 50 (*)     All other components within normal limits    Narrative:     Specimen Source->Urine   POCT GLUCOSE - Abnormal; Notable for the following components:    POCT Glucose 117 (*)     All other components within normal limits   CULTURE, BLOOD   CULTURE, BLOOD   CULTURE, RESPIRATORY   LACTIC ACID, PLASMA   PROTIME-INR   PROCALCITONIN   MAGNESIUM   SARS-COV-2 RNA AMPLIFICATION, QUAL   LACTIC ACID, PLASMA   POCT GLUCOSE, HAND-HELD DEVICE   POCT GLUCOSE, HAND-HELD DEVICE           Results for orders placed or performed during the hospital encounter of 03/17/22   CBC auto differential   Result Value Ref Range    WBC 3.72 (L) 3.90 - 12.70 K/uL    RBC 4.36 4.00 - 5.40 M/uL    Hemoglobin 11.3 (L) 12.0 - 16.0 g/dL    Hematocrit 37.3 37.0 - 48.5 %    MCV 86 82 - 98 fL    MCH 25.9 (L) 27.0 - 31.0 pg     MCHC 30.3 (L) 32.0 - 36.0 g/dL    RDW 16.3 (H) 11.5 - 14.5 %    Platelets 264 150 - 450 K/uL    MPV 10.9 9.2 - 12.9 fL    Immature Granulocytes 0.3 0.0 - 0.5 %    Gran # (ANC) 2.7 1.8 - 7.7 K/uL    Immature Grans (Abs) 0.01 0.00 - 0.04 K/uL    Lymph # 0.6 (L) 1.0 - 4.8 K/uL    Mono # 0.2 (L) 0.3 - 1.0 K/uL    Eos # 0.2 0.0 - 0.5 K/uL    Baso # 0.01 0.00 - 0.20 K/uL    nRBC 0 0 /100 WBC    Gran % 72.3 38.0 - 73.0 %    Lymph % 16.9 (L) 18.0 - 48.0 %    Mono % 6.2 4.0 - 15.0 %    Eosinophil % 4.0 0.0 - 8.0 %    Basophil % 0.3 0.0 - 1.9 %    Differential Method Automated    Comprehensive metabolic panel   Result Value Ref Range    Sodium 135 (L) 136 - 145 mmol/L    Potassium 4.6 3.5 - 5.1 mmol/L    Chloride 103 95 - 110 mmol/L    CO2 22 (L) 23 - 29 mmol/L    Glucose 82 70 - 110 mg/dL    BUN 25 (H) 6 - 20 mg/dL    Creatinine 0.5 0.5 - 1.4 mg/dL    Calcium 9.9 8.7 - 10.5 mg/dL    Total Protein 8.7 (H) 6.0 - 8.4 g/dL    Albumin 3.1 (L) 3.5 - 5.2 g/dL    Total Bilirubin 1.0 0.1 - 1.0 mg/dL    Alkaline Phosphatase 249 (H) 55 - 135 U/L    AST 54 (H) 10 - 40 U/L    ALT 98 (H) 10 - 44 U/L    Anion Gap 10 8 - 16 mmol/L    eGFR if African American >60 >60 mL/min/1.73 m^2    eGFR if non African American >60 >60 mL/min/1.73 m^2   Lactic acid, plasma #1   Result Value Ref Range    Lactate (Lactic Acid) 1.0 0.5 - 2.2 mmol/L   Urinalysis, Reflex to Urine Culture Urine, Clean Catch    Specimen: Urine   Result Value Ref Range    Specimen UA Urine, Catheterized     Color, UA Yellow Yellow, Straw, Patricia    Appearance, UA Clear Clear    pH, UA 8.0 5.0 - 8.0    Specific Gravity, UA 1.020 1.005 - 1.030    Protein, UA 3+ (A) Negative    Glucose, UA Negative Negative    Ketones, UA Negative Negative    Bilirubin (UA) Negative Negative    Occult Blood UA 3+ (A) Negative    Nitrite, UA Negative Negative    Urobilinogen, UA 4.0-6.0 (A) <2.0 EU/dL    Leukocytes, UA 1+ (A) Negative   Protime-INR   Result Value Ref Range    Prothrombin Time 11.9  9.0 - 12.5 sec    INR 1.1 0.8 - 1.2   Procalcitonin   Result Value Ref Range    Procalcitonin 0.04 <0.25 ng/mL   Magnesium   Result Value Ref Range    Magnesium 1.8 1.6 - 2.6 mg/dL   COVID-19 Rapid Screening   Result Value Ref Range    SARS-CoV-2 RNA, Amplification, Qual Negative Negative   Urinalysis Microscopic   Result Value Ref Range    RBC, UA 50 (H) 0 - 4 /hpf    WBC, UA 5 0 - 5 /hpf    Bacteria Occasional None-Occ /hpf    Hyaline Casts, UA 0 0-1/lpf /lpf    Microscopic Comment SEE COMMENT    POCT glucose   Result Value Ref Range    POCT Glucose 117 (H) 70 - 110 mg/dL     US Abdomen Limited   Final Result      Cholelithiasis in a contracted gallbladder.  Mild hydronephrosis of the right kidney.         Electronically signed by: Shar Hubbard MD   Date:    03/17/2022   Time:    12:09      X-Ray Chest AP Portable   Final Result      Right lower lobe pneumonia.  Tracheostomy tube in place.         Electronically signed by: Shar Hubbard MD   Date:    03/17/2022   Time:    09:09          Imaging Results          US Abdomen Limited (Final result)  Result time 03/17/22 12:09:59    Final result by Shar Hubbard Jr., MD (03/17/22 12:09:59)                 Impression:      Cholelithiasis in a contracted gallbladder.  Mild hydronephrosis of the right kidney.      Electronically signed by: Shar Hubbard MD  Date:    03/17/2022  Time:    12:09             Narrative:    EXAMINATION:  US ABDOMEN LIMITED    CLINICAL HISTORY:  abnormal LFTs;    TECHNIQUE:  Limited ultrasound of the right upper quadrant of the abdomen (including pancreas, liver, gallbladder, common bile duct, and spleen) was performed.    COMPARISON:  None.    FINDINGS:  Liver: Normal in size, measuring 17 cm. Homogeneous echotexture. No focal hepatic lesions.    Gallbladder: The gallbladder is contracted and contains multiple gallstones.  The wall is mildly thickened at 3 mm.    Biliary system: The common duct is not dilated, measuring 5 mm.  No  intrahepatic ductal dilatation.    Spleen: Normal in size and echotexture, measuring 11.8 cm.    Miscellaneous: There is mild right-sided hydronephrosis.                               X-Ray Chest AP Portable (Final result)  Result time 03/17/22 09:09:45    Final result by hSar Hubbard Jr., MD (03/17/22 09:09:45)                 Impression:      Right lower lobe pneumonia.  Tracheostomy tube in place.      Electronically signed by: Shar Hubbard MD  Date:    03/17/2022  Time:    09:09             Narrative:    EXAMINATION:  XR CHEST AP PORTABLE    CLINICAL HISTORY:  Sepsis;    TECHNIQUE:  Single frontal view of the chest was performed.    COMPARISON:  Chest portable of January 25, 2022    FINDINGS:  A tracheostomy tube remains in position.  The mediastinal and cardiac size and contours are normal.  There is patchy infiltrate in the right lower lobe consistent with pneumonia.  A plastic device overlies the left mid lung field without a definite infiltrate on the left.  No pneumothorax is pleural effusion is seen.                                 Medications   meropenem (MERREM) 2 g in sodium chloride 0.9% 100 mL IVPB (2 g Intravenous New Bag 3/17/22 1153)   vancomycin 1.25 g in dextrose 5% 250 mL IVPB (ready to mix) (1,250 mg Intravenous New Bag 3/17/22 1100)   meropenem (MERREM) 2 g in sodium chloride 0.9% 100 mL IVPB (has no administration in time range)   vancomycin - pharmacy to dose (has no administration in time range)   dextrose 5 % and 0.9 % NaCl infusion (has no administration in time range)   acetylcysteine 100 mg/ml (10%) solution 4 mL (has no administration in time range)   albuterol-ipratropium 2.5 mg-0.5 mg/3 mL nebulizer solution 3 mL (has no administration in time range)   artificial tears 0.5 % ophthalmic solution 1 drop (has no administration in time range)   midodrine tablet 10 mg (has no administration in time range)   scopolamine 1.3-1.5 mg (1 mg over 3 days) 1 patch (has no administration  "in time range)   trazodone split tablet 25 mg (has no administration in time range)   polyethylene glycol packet 17 g (has no administration in time range)   morphine injection 3 mg (has no administration in time range)   naloxone 0.4 mg/mL injection 0.02 mg (has no administration in time range)   insulin aspart U-100 pen 0-5 Units (has no administration in time range)   glucose chewable tablet 16 g (has no administration in time range)   glucose chewable tablet 24 g (has no administration in time range)   dextrose 50% injection 12.5 g (has no administration in time range)   dextrose 50% injection 25 g (has no administration in time range)   glucagon (human recombinant) injection 1 mg (has no administration in time range)   enoxaparin injection 40 mg (has no administration in time range)   acetaminophen oral solution 650 mg (has no administration in time range)   sodium chloride 0.9% bolus 1,000 mL (has no administration in time range)   lactated ringers bolus 1,974 mL (0 mL/kg × 65.8 kg Intravenous Stopped 3/17/22 1057)     Medical Decision Making:   History:   Old Medical Records: I decided to obtain old medical records.  Independently Interpreted Test(s):   I have ordered and independently interpreted X-rays - see prior notes.  I have ordered and independently interpreted EKG Reading(s) - see prior notes  Clinical Tests:   Lab Tests: Ordered and Reviewed  Radiological Study: Ordered and Reviewed  Medical Tests: Ordered and Reviewed  Sepsis Perfusion Assessment: "I attest a sepsis perfusion exam was performed within 6 hours of sepsis, severe sepsis, or septic shock presentation, following fluid resuscitation."  ED Management:  This is 61-year-old female who presents emergency department as a transfer for hypothermia.  She is unable to provide any history as she is vent dependent.  Patient found to have evidence of pneumonia which may be worsening her chronic hypothermia.  She is placed on a Beth Hugger.  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  She has improved.  She will be admitted to ICU for further care and treatment of her symptoms.          Scribe Attestation:   Scribe #1: I performed the above scribed service and the documentation accurately describes the services I performed. I attest to the accuracy of the note.    Attending Attestation:         Attending Critical Care:   Critical Care Times:   Direct Patient Care (initial evaluation, reassessments, and time considering the case)................................................................15 minutes.   Additional History from reviewing old medical records or taking additional history from the family, EMS, PCP, etc.......................5 minutes.   Ordering, Reviewing, and Interpreting Diagnostic Studies...............................................................................................................5 minutes.   Documentation..................................................................................................................................................................................5 minutes.   Consultation with other Physicians. .................................................................................................................................................5 minutes.   Consultation with the patient's family directly relating to the patient's condition, care, and DNR status (when patient unable)......5 minutes.   ==============================================================  · Total Critical Care Time - exclusive of procedural time: 40 minutes.  ==============================================================  Critical care was necessary to treat or prevent imminent or life-threatening deterioration of the following conditions: sepsis.   The following critical care procedures were  done by me (see procedure notes): blood draw for specimens and pulse oximetry.   Critical care was time spent personally by me on the following activities: obtaining history from patient or relative, examination of patient, development of treatment plan with patient or relative, ordering and performing treatments and interventions, evaluation of patient's response to treatment, discussions with primary provider, interpretation of cardiac measurements, re-evaluation of patient's conition and review of old charts.   Critical Care Condition: potentially life-threatening           ED Course as of 03/17/22 1225   u Mar 17, 2022   0951 EKG 9:25 a.m. normal sinus rhythm rate of 60, first-degree AV block.  No ST elevation or depression.  No STEMI [JR]   0954 I discussed case with the hospitalist to admit the patient to the hospital [JR]      ED Course User Index  [JR] Terry Nino DO             Clinical Impression:   Final diagnoses:  [T68.XXXA] Hypothermia  [J18.9] Pneumonia of right lower lobe due to infectious organism (Primary)          ED Disposition Condition    Admit               Terry Nino DO  03/17/22 1226

## 2022-03-17 NOTE — CONSULTS
"  03/17/2022      Admit Date: 3/17/2022  Genna Felix  New Patient Consult    Chief Complaint   Patient presents with    hypothermia     Pt sent by [facility] for hypothermia, nursing home staff reported to EMS t6hat pt warming blanket was not working       History of Present Illness:  Pt not arousing, with tactile stimuli her upper right gaze goes to left upper gaze.  Contracted.  On vent    Pt had prior pseudomonas , esbl klebsiella, and serratia  in sputum Jan 2022.  Review of record indicates h/o sz, cirrhosis, anoxic brain injury,   c diff, chr carroll.    Pt was NSR for  3 days 1/24/2022 -- Hospital Course:   Patient is a [place] patient was unfortunately the victim of anoxic brain injury and persistent vegetative state who presented to the hospital with hypothermia.  She underwent evaluation for sepsis and started on broad-spectrum antimicrobial therapy.  She underwent further evaluation cause of, including CT scan of the abdomen as she had a recent cholecystostomy tube, as well as chest x-ray.  Likely etiology of her sepsis was secondary to pneumonia.  She improved with broad-spectrum antimicrobial therapy, and was discharged to long-term acute care to completed full course of 10 days of IV antibiotics.  Goals of care discussion was had with the patient's family, who opted for DNR, but continuation of the current treatment.       From ERP, Dr Nino hpi today:Genna Felix is a 61 y.o. female who presents to the ED via EMS from [place] with an onset of hypothermia.  Patient was atop of a thermal blanket in order to regulate her temperature, but the blanket malfunctioned throughout the night causing patient's temperature to drop.  She presented to the ED with a rectal temp of 89 °F.  EMS notes the patient has a "blood shot" left eye and a steady "upward gaze."  Patient presents with a drain to the RUQ, booties to bilateral feet, trach in place and Carroll catheter, per EMS.  They note patient is a "full code" " and responds with a jump to startling noises only.  No other symptoms reported at this time.  Patient has a Hx of respiratory distress, hemangioma of intra-abdominal structure, tracheostomy dependence, anoxic brain damage, PE, HTN, protein claorie malnutrition, PEG tube placement, bed bound, total self-care deficit, nursing home resident, and cardiac arrest.  PSHx includes PEG with tracheostomy placement.  HPI is limited secondary to nonverbal patient.      PFSH:  Past Medical History:   Diagnosis Date    Anoxic brain damage 07/2020    Bedbound 07/2020    Cardiac arrest 07/2020    Cirrhosis     GERD (gastroesophageal reflux disease)     Hemangioma of intra-abdominal structure     Hypertension     Nursing home resident     Protein calorie malnutrition     Pulmonary embolus     Respiratory distress     S/P percutaneous endoscopic gastrostomy (PEG) tube placement 07/2020    Total self-care deficit 07/2020    Tracheostomy dependence     7/2020     Past Surgical History:   Procedure Laterality Date    PEG W/TRACHEOSTOMY PLACEMENT  07/27/2020    SKIN GRAFT      buttocks     Social History     Tobacco Use    Smoking status: Never Smoker    Smokeless tobacco: Never Used   Substance Use Topics    Alcohol use: Not Currently    Drug use: Not Currently     Family History   Problem Relation Age of Onset    No Known Problems Mother     No Known Problems Father      Review of patient's allergies indicates:  No Known Allergies    Performance Status:Performance Status:The patient's activity level is bedbound.    Review of Systems:  due to neurologic status/impairments a Review of Systems could not be obtained       Exam:Comprehensive exam done. BP (!) 96/55   Pulse 107   Temp (!) 94.28 °F (34.6 °C)   Resp (!) 23   Ht 5' (1.524 m)   Wt 74.5 kg (164 lb 3.9 oz)   SpO2 97%   BMI 32.08 kg/m²   Exam included Vitals as listed, and patient's appearance and affect and alertness and mood, oral exam for yeast and  hygiene and pharynx lesions and Mallapatti (M) score, neck with inspection for jvd and masses and thyroid abnormalities and lymph nodes (supraclavicular and infraclavicular nodes also examined and noted if abn), chest exam included symmetry and effort and fremitus and percussion and auscultation, cardiac exam included rhythm and gallops and murmur and rubs and jvd and edema, abdominal exam for mass and hepatosplenomegaly and tenderness and hernias and bowel sounds, Musculoskeletal exam with muscle tone and posture and mobility/gait and  strenght, and skin for rashes and cyanosis and pallor and turgor, extremity for clubbing.  Findings were normal except as listed below:  Not arousing, chr trach, rr 29, chest is symmetric, no distress, normal percussion, normal fremitus and good normal breath sounds no gross edema but puffy thighs.      Radiographs reviewed: view by direct vision    Results for orders placed during the hospital encounter of 01/03/22    X-Ray Chest 1 View    Narrative  EXAMINATION:  XR CHEST 1 VIEW    CLINICAL HISTORY:  aspiration;    TECHNIQUE:  Single frontal view of the chest was performed.    COMPARISON:  01/09/2022    FINDINGS:  A tracheostomy cannula is present with the heart size is normal.  There is patchy airspace disease in the right mid to lower lung zone without significant change.  This is superimposed on generalized prominence of interstitial markings.  There is no pleural effusion or pneumothorax.    Impression  Right lung infiltrate without significant change.      Electronically signed by: Salvador Smith MD  Date:    01/10/2022  Time:    15:25  ]    Labs     Recent Labs   Lab 03/17/22  0843   WBC 3.72*   HGB 11.3*   HCT 37.3        Recent Labs   Lab 03/17/22  0843 03/17/22  1322   *  --    K 4.6  --      --    CO2 22*  --    BUN 25*  --    CREATININE 0.5  --    GLU 82  --    CALCIUM 9.9  --    MG 1.8  --    AST 54*  --    ALT 98*  --    ALKPHOS 249*  --     BILITOT 1.0  --    PROT 8.7*  --    ALBUMIN 3.1*  --    PROCAL 0.04  --    INR 1.1  --    LACTATE 1.0 1.0   No results for input(s): PH, PCO2, PO2, HCO3 in the last 24 hours.  Microbiology Results (last 7 days)     Procedure Component Value Units Date/Time    Culture, Respiratory with Gram Stain [[id number]] Collected: 03/17/22 1337    Order Status: Sent Specimen: Respiratory from Tracheal Aspirate Updated: 03/17/22 1401    Blood culture x two cultures. Draw prior to antibiotics. [[id number]] Collected: 03/17/22 0844    Order Status: Sent Specimen: Blood Updated: 03/17/22 1231    Blood culture x two cultures. Draw prior to antibiotics. [[id number]] Collected: 03/17/22 0854    Order Status: Sent Specimen: Blood from Peripheral, Right Hand Updated: 03/17/22 1231          Impression:  Active Hospital Problems    Diagnosis  POA    Cirrhosis of liver [K74.60]  Yes    Acute on chronic respiratory failure with hypoxia [J96.21]  Yes    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]  Not Applicable    Severe sepsis [A41.9, R65.20]  Yes    Persistent vegetative state [R40.3]  Yes      Resolved Hospital Problems   No resolved problems to display.               Plan: t min 89, p 58 to 107, merrem/vanc, midodrine, lovenox 40/d,  28% ox,wbc 3.7, procal 0.04, u/a pos for blood and wbc, cxr min rll opacification-- ct abd and cxr 1/26 with rll density from apparent atelectasis.     Urine culture positive for proteus 1/5/2022 s to merrem.  Not clear if had c diff prophylaxsis recent admits but  c diff Pos May 2021.    I do not see clear pneumonia with cxr pattern and procal.  Would monitor on merrem.       H/o prior c diff -- consider prophylaxsis for c diff?  Monitor.      The following were evaluated and adjusted as needed: mechanical ventilator settings and weaning status, intravenous fluids and nutritional status, sedation and neurologic status, antibiotics, hemodynamics, support tubes and access lines and invasive monitoring, acid base  balance and oxygenation needs and input and output and renal status       Critical Care  - THE PATIENT HAS A HIGH PROBABILITY OF IMMINENT OR LIFE THREATENING DETERIORATION.  Over 50%time of encounter was in direct care at bedside.  Time was 30 to 74 minutes required for patient care.  Time needed for all of the above totaled 36 minutes.

## 2022-03-17 NOTE — CARE UPDATE
03/17/22 1329   Patient Assessment/Suction   Respiratory Effort Unlabored   Expansion/Accessory Muscles/Retractions no use of accessory muscles   All Lung Fields Breath Sounds coarse   PRE-TX-O2   O2 Device (Oxygen Therapy) ventilator   $ Is the patient on Low Flow Oxygen? Yes   Oxygen Concentration (%) 28   SpO2 100 %   Pulse Oximetry Type Continuous   $ Pulse Oximetry - Multiple Charge Pulse Oximetry - Multiple   Pulse 94   Resp (!) 24   Temp (!) 91.94 °F (33.3 °C)   ETCO2   $ ETCO2 Usage Currently wearing   ETCO2 (mmHg) 25 mmHg   ETCO2 Device Type Bedside Monitor   Skin Integrity   $ Wound Care Tech Time 15 min   Area Observed Neck;Neck under tracheostomy   Skin Appearance without discoloration        Surgical Airway Portex Cuffed   No placement date or time found.   Present Prior to Hospital Arrival?: Yes  Brand: Portex  Airway Device Size: 8.0  Style: Cuffed   Cuff Pressure 26 cm H2O   Cuff Inflation? Inflated   Speaking Valve Usage Not wearing   Status Secured   Site Assessment Drainage   Site Care Cleansed;Dried;Protective barrier to skin   Ties Assessment Changed   Airway Safety   Ambu bag with the patient? Yes, Adult Ambu   Is mask with the patient? Yes, Adult Mask   Suction set is at the bedside? Yes   Airway Assistance   $ Trach Assist Charges Tech time 15 min   Vent Select   Conventional Vent Y   Charged w/in last 24h YES   Preset Conventional Ventilator Settings   Vent Type NKV-550   Ventilation Type VC   Vent Mode A/CMV-VC   Humidity HME   Set Rate 14 BPM   Vt Set 400 mL   PEEP/CPAP 4.3 cmH20   Waveform RAMP   Patient Ventilator Parameters   Resp Rate Total 25 br/min   Peak Airway Pressure 28 cmH2O   Exhaled Vt 358 mL   Total Ve 8.96 mL   I:E Ratio Measured 1:1.1   Inspired Tidal Volume (VTI) 406 mL   Conventional Ventilator Alarms   Alarms On Y   Apnea Rate 15   Apnea Volume (mL) 300 mL   IHI Ventilator Associated Pneumonia Bundle (Required)   Head of Bed Elevated  HOB 30   Oral Care Lip  moisturizer applied;Mouth swabbed;Mouth moisturizer;Mouth suctioned   Vent Circut Breaks Minimized Yes   Ready to Wean/Extubation Screen   FIO2<=50 (chart decimal) 0.28   MV<16L (chart vol.) 8.96   PEEP <=8 (chart #) 4.3   Ready to Wean Parameters   F/VT Ratio<105 (RSBI) (!) 67.04

## 2022-03-17 NOTE — ED NOTES
Patient was brought in by EMS from [facility] due to hypothermia. Patient is normally on a warming blanket at night but her blanket malfunctioned throughout the night. Per [place] nurse when temp was checked patient's temp was 91.0. Patient has tracheostomy on a vent. Nonverbal with involuntary movement. Her skin is intact on buttock and sacral but she has excoriation around rectum area and between her inner thighs. 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 She has heel protector boots on both feet

## 2022-03-17 NOTE — PLAN OF CARE
[facility]  Initial Discharge Assessment       Primary Care Provider: Primary Doctor No    Admission Diagnosis: Hypothermia [T68.XXXA]  Pneumonia of right lower lobe due to infectious organism [J18.9]    Admission Date: 3/17/2022  Expected Discharge Date:      CM called pts sister Amelai Hidalgo at [phone]- phone rang one time and then disconnected. CM called pts daughter Starr Felix at [phone] and verified that she is aware that her mom is here and confirmed that she arrived from [facility] and that she will return there at the time of discharge. CM following.     The pts sisterAmelia did return my call and I updated her that her sister was being admitted to ICU room 520 and gave her the nurses station phone number for updates. She thanked me for the assistance.     Discharge Barriers Identified: None    Payor: MEDICARE / Plan: MEDICARE PART A & B / Product Type: Government /     Extended Emergency Contact Information  Primary Emergency Contact: Amelia Hidalgo  Mobile Phone: [phone]  Relation: Sister  Preferred language: English   needed? No  Secondary Emergency Contact: Starr Felix  Mobile Phone: [phone]  Relation: Daughter    Discharge Plan A: Return to nursing home  Discharge Plan B: Return to Nursing Home      [facility] [facility] [address] [address]  Phone: [phone] Fax: [phone]      Initial Assessment (most recent)     Adult Discharge Assessment - 03/17/22 1416        Discharge Assessment    Assessment Type Discharge Planning Assessment     Confirmed/corrected address, phone number and insurance Yes     Confirmed Demographics Correct on Facesheet     Source of Information patient     Reason For Admission hypothermia     Lives With facility resident     Facility Arrived From: [facility]     Do you expect to return to your current living situation? Yes     Prior to hospitilization  cognitive status: Unable to Assess     Current cognitive status: Unable to Assess     Walking or Climbing Stairs Difficulty ambulation difficulty, requires equipment     Dressing/Bathing Difficulty bathing difficulty, requires equipment     Equipment Currently Used at Home hospital bed;respiratory supplies;oxygen;ventilator     Readmission within 30 days? No     Patient currently being followed by outpatient case management? No     Do you currently have service(s) that help you manage your care at home? No     Do you take prescription medications? Yes     Do you have prescription coverage? Yes     Do you have any problems affording any of your prescribed medications? No     Is the patient taking medications as prescribed? yes     Who is going to help you get home at discharge? [place] staff     How do you get to doctors appointments? other (see comments)     Are you on dialysis? No     Do you take coumadin? No     Discharge Plan A Return to nursing home     Discharge Plan B Return to Nursing Home     DME Needed Upon Discharge  none     Discharge Plan discussed with: Adult children     Discharge Barriers Identified None

## 2022-03-17 NOTE — CARE UPDATE
03/17/22 0825   Patient Assessment/Suction   Level of Consciousness (AVPU) unresponsive   Respiratory Effort Normal;Unlabored   Expansion/Accessory Muscles/Retractions expansion symmetric;no retractions;no use of accessory muscles   All Lung Fields Breath Sounds clear   Rhythm/Pattern, Respiratory assisted mechanically   PRE-TX-O2   O2 Device (Oxygen Therapy) ventilator   $ Is the patient on Low Flow Oxygen? Yes   Pulse Oximetry Type Continuous   $ Pulse Oximetry - Multiple Charge Pulse Oximetry - Multiple   Skin Integrity   $ Wound Care Tech Time 15 min   Area Observed Neck under tracheostomy   Skin Appearance without discoloration        Surgical Airway Portex Cuffed   No placement date or time found.   Present Prior to Hospital Arrival?: Yes  Brand: Portex  Airway Device Size: 8.0  Style: Cuffed   Status Secured   Site Assessment Dry;Clean;Midline   General Safety Checklist   Safety Promotion/Fall Prevention side rails raised   Airway Safety   Ambu bag with the patient? Yes, Adult Ambu   Is mask with the patient? Yes, Adult Mask   Suction set is at the bedside? Yes   Extra trach at bedside? No   Is obturator available? No   Vent Select   Conventional Vent Y   Ventilator Initiated Yes   $ Ventilator Initial 1   Charged w/in last 24h YES   Preset Conventional Ventilator Settings   Vent Type NKV-550   Ventilation Type VC   Humidity HME   Waveform RAMP   I-Trigger Type  Flow   Trigger Sensitivity Flow/I-Trigger 1.5 L/min   Patient Ventilator Parameters   Tubing ID (mm) 8 mm   Tube Type Trach   Conventional Ventilator Alarms   Alarms On Y   Ve High Alarm 15 L/min   Ve Low Alarm 3 L/min   Vt High Alarm 15 mL   Vt Low Alarm 1 mL   Resp Rate High Alarm 40 br/min   Press High Alarm 50 cmH2O   Delay Alarm (Sec) 30 sec(s)       Rec'd pt from [facility] via EMS being manually ventilated. Pt placed on vent with documented settings, no respiratory distress noted, clear bbs.

## 2022-03-17 NOTE — H&P
History and Physical for Admission  [facility]      Genna Felix (MRN: [id number])  Admitting Service: Hospital Medicine  Date of Admission: 3/17/2022   Primary Care Provider: Primary Doctor No    ?  Chief complaint: hypothermia   ?  History of Present Illness:         61F h/o PE, anoxic brain injury, now vent/trach-PEG feeding dependent also with ?cholecystostomy tube in place who is brought from [place] for hypothermia found to have a new pneumonia.       On the morning of admission trended found the patient with her warming blanket not functioning.  At that time she was found to be hypothermic leading to their transferring her here.    All information derived from ER team and [place] chart given patient's current cognitive state.       ?  ?  Review of Systems:   Unable to obtain given current cognitive state    Medical History    PAST MEDICAL HISTORY:  has a past medical history of Anoxic brain damage (07/2020), Bedbound (07/2020), Cardiac arrest (07/2020), Cirrhosis, GERD (gastroesophageal reflux disease), Hemangioma of intra-abdominal structure, Hypertension, Nursing home resident, Protein calorie malnutrition, Pulmonary embolus, Respiratory distress, S/P percutaneous endoscopic gastrostomy (PEG) tube placement (07/2020), Total self-care deficit (07/2020), and Tracheostomy dependence.    PAST SURGICAL HISTORY:  has a past surgical history that includes PEG w/tracheostomy placement (07/27/2020) and Skin graft.  ?  PAST SOCIAL HISTORY:  reports that she has never smoked. She has never used smokeless tobacco. She reports previous alcohol use. She reports previous drug use.    FAMILY HISTORY: family history includes No Known Problems in her father and mother.    ?  CURRENT OUTPATIENT MEDS  Allergies: Review of patient's allergies indicates:  No Known Allergies  ?    ?  PHYSICAL EXAM  Vitals: BP (!) 96/55   Pulse 107   Temp (!) 94.28 °F (34.6 °C)   Resp (!) 23   Ht 5' (1.524 m)   Wt 74.5 kg (164 lb  3.9 oz)   SpO2 97%   BMI 32.08 kg/m²  Body mass index is 32.08 kg/m².    General: NAD, responds to pain   HEENT: upward gaze. Left sclera mildly erythematous   Cardiovascular: RRR  Respiratory: lungs CTAB  GI: abdomen S/NT/ND, +BS. ?cholecystostomy tube present   PEG tube present   Extremities: no edema  MSK: contractures present   Neuro/Psych: upward gaze. Contractures   Elbow: Pressure ulcer present       EKG and radiographs from the past 24h: reviewed and personally interpreted if available.      LABS    Recent Labs     03/17/22  0843   WBC 3.72*   HGB 11.3*        ?  Recent Labs     03/17/22  0843   *   K 4.6   CO2 22*   BUN 25*   CREATININE 0.5   MG 1.8     ?  Recent Labs     03/17/22  0843   BILITOT 1.0   AST 54*   ALT 98*   ALKPHOS 249*   PROT 8.7*     ?  Recent Labs     03/17/22  0843   INR 1.1     ?    ASSESSMENT & PLAN      61F h/o PE, anoxic brain injury, now vent/trach-PEG feeding dependent also with ?cholecystostomy tube in place who is brought from [place] for hypothermia found to have a new pneumonia.     1. Acute bacterial pneumonia 2/2 unknown organism  -vanc/merrem  -warming blanket   -pulm consult    2. Severe sepsis 2/2 pneumonia leading to respiratory distress, hypotension, leukopenia with hepatic dysfunction 2/2 unknown organism.  Lactate reviewed. Abx as above. Fluids as needed    3. Benzodiazepine dependence   -Given blood pressure and current cognitive state, holding home clonazepam 0.5 mg tid. Watch for withdrawal.   -Ordering EEG given upward gaze preference    4.  Chronic respiratory failure/ventilator dependent     5. Tube feeds ordered.     6. CDiff ppx given history  -oral vanco    DVT ppx: lovenox       Code Status/Dispo: Full Code.   ?  Rajeev Stevenson    Date: 3/17/2022

## 2022-03-17 NOTE — CONSULTS
[facility] Emergency Dept  Adult Nutrition  Consult Note    SUMMARY   Intervention: enteral nutrition therapy, collaboration with care providers      Recommendation:  1) If hemodynamically stable home TF appropriate   Isosource 1.5 @ 50 ml/hr + 110 ml flush q 4 hr + beneprotein BID +Ebnny BID   ( provides 1800 kcal ( > 100% EEN), 81 g protein + bene ( 89% EPN), 912 ml free water)  -if not hemodynamically stable Nutren 1.5 same rate appropriate      2) If pt has trouble tolerating TF consider PPN  D 5 AA 4.25 @ 75 ml/hr + standard lipids ( 1112 kcal, 76 g protein)     3) weigh weekly, replete lytes as needed     Goals: 1) TF meeting goal at f/u  Nutrition Goal Status: new  Communication of RD Recs: POC, sticky note, reviewed with MD     Assessment and Plan     Inadequate energy/protein intake  R/t NPO, trouble swallowing  As evidenced by inadequate TF infusion x 1 day, stage 4 coccyx wound   Intervention: above  new     Malnutrition Assessment  Skin (Micronutrient):  (Hardik = ? , stage 4 coccyx wound, elbow wound noted last admit)  Teeth (Micronutrient): missing some  Micronutrient Evaluation: suspected deficiency  Micronutrient Evaluation Comments: check iron              Edema (Fluid Accumulation): ?     Reason for Assessment     Reason For Assessment: consult  Diagnosis:  ?  Relevant Medical History: recurrent pneumonia/ UTI, [place] resident, HTN, GERD, PEG/trach, anoxic brain injury, cardiac arrest  Interdisciplinary Rounds: attended     General Information Comments: 62 y/o female admitted from [place] PEG/trach dependent. IN ED, no diagnosis/flowsheet info noted yet. NFPE to be completed at f/u. MD asking for TF recommendations. PTA receiving Isosource 1.5 @ 50 ml/hr + 250 ml flush q 6 hr at [place] + arginaid packet BID. Wt fluctuations x >1 year.     Nutrition Discharge Planning: To be determined- TF PTA Isosource 1.5 @ 20 ml/hr advancing to goal of 50 ml/hr + 250 ml flush q 6 hr + Benny or arginaid  BID     Nutrition Risk Screen     Nutrition Risk Screen: tube feeding or parenteral nutrition,dysphagia or difficulty swallowing     Nutrition/Diet History     Patient Reported Diet/Restrictions/Preferences: no oral intake  Spiritual, Cultural Beliefs, Restorationism Practices, Values that Affect Care: no  Food Allergies: NKFA  Factors Affecting Nutritional Intake: NPO,difficulty/impaired swallowing     Anthropometrics  Height Method: Stated  Height: 5'  Height (inches): 60 in  Weight Method: Bed Scale  Weight: 65.8 kg 3/17/22  Weight (lb): 145lb ( wt fluctuations)  Ideal Body Weight (IBW), Female: 100 lb  BMI (Calculated): 28 kg/m2  BMI Grade: overweight  Usual Body Weight (UBW), k.1 kg (20)  69.8 kg 21, , 71.7 kg 21, 76.6 kg 22, 72.3 kg 1/10/21, 65.7 kg 21, 59.4 kg (10/25/21), 62.3 kg 21     Lab/Procedures/Meds     Pertinent Labs Reviewed: reviewed  BMP  Lab Results   Component Value Date     (L) 2022    K 4.6 2022     2022    CO2 22 (L) 2022    BUN 25 (H) 2022    CREATININE 0.5 2022    CALCIUM 9.9 2022    ANIONGAP 10 2022    ESTGFRAFRICA >60 2022    EGFRNONAA >60 2022     Lab Results   Component Value Date    CALCIUM 9.9 2022    PHOS 2.2 (L) 2022     No results found for: POCTGLUCOSE    Lab Results   Component Value Date    ALBUMIN 3.1 (L) 2022          Pertinent Medications Reviewed: reviewed  D5 NS @ 75 ml/hr, insulin, polyethylene glycol     Estimated/Assessed Needs     Weight Used For Calorie Calculations: 65.8 kg  Energy Calorie Requirements (kcal): MSJ x 1.4 = 1400 kcal needs possibly more? As no gaining wt on home TF  Energy Need Method: no Ve total available ( MSJ)  Protein Requirements: 1.5 g protein/kg ( wound/ICU) = 98 g  Weight Used For Protein Calculations: 65.8 kg  Fluid Requirements (mL): 1400 ml  Estimated Fluid Requirement Method: RDA Method  CHO Requirement: N/A        Nutrition  Prescription Ordered     Current Diet Order: TF above, NPO     Evaluation of Received Nutrient/Fluid Intake     Energy Calories Required: not meeting  Protein Required: not meeting  Fluid Required: meeting needs  Tolerance: tolerating  IVF @ 75 ml/hr  % Intake of Estimated Energy Needs: 21% D5  % Meal Intake:NPO         Nutrition Risk     Level of Risk/Frequency of Follow-up: moderate 2 x weekly        Monitor and Evaluation     Food and Nutrient Intake: energy intake  Food and Nutrient Adminstration: enteral and parenteral nutrition administration  Anthropometric Measurements: weight  Biochemical Data, Medical Tests and Procedures: electrolyte and renal panel,gastrointestinal profile,glucose/endocrine profile  Nutrition-Focused Physical Findings: overall appearance , skin        Nutrition Follow-Up     RD Follow-up?: Yes

## 2022-03-17 NOTE — ED NOTES
Dr Stevenson was at the bedside performing his assessment. Patient BP is slowing dropping from 114 to 91 SBP. MD ordered a NS bolus 1000 ml IV once and ordered for  PICC team to come insert a PICC line.

## 2022-03-17 NOTE — NURSING
Pt to bed 520 at 1250. On vent via trach. Bolus in process. Beth hugger utilized and rectal probe in place. Current temp 91.6F. VSS otherwise. PICC team to place line soon. Pt has carroll w/ yellow urine. G-tube clamped. Bili tube to gravity w/ bilious drainage - dressing changed. Pt unresponsive d/t PMH of anoxic brain injury.

## 2022-03-17 NOTE — PLAN OF CARE
Intervention: enteral nutrition therapy, collaboration with care providers      Recommendation:  1) If hemodynamically stable home TF appropriate   Isosource 1.5 @ 50 ml/hr + 110 ml flush q 4 hr + beneprotein BID +Benny BID   ( provides 1800 kcal ( > 100% EEN), 81 g protein + bene ( 89% EPN), 912 ml free water)  -if not hemodynamically stable Nutren 1.5 same rate appropriate      2) If pt has trouble tolerating TF consider PPN  D 5 AA 4.25 @ 75 ml/hr + standard lipids ( 1112 kcal, 76 g protein)     3) weigh weekly, replete lytes as needed     Goals: 1) TF meeting goal at f/u  Nutrition Goal Status: new  Communication of RD Recs: POC, sticky note, reviewed with MD

## 2022-03-17 NOTE — PROGRESS NOTES
Pharmacokinetic Initial Assessment: IV Vancomycin    Assessment/Plan:    Initiate intravenous vancomycin with loading dose of 1250 mg once   Desired empiric serum trough concentration is 15 to 20 mcg/mL  Draw vancomycin random level on 3/18  at 0400.  Pharmacy will continue to follow and monitor vancomycin.      Please contact pharmacy at extension [phone] with any questions regarding this assessment.     Thank you for the consult,   Mayra Spencer       Patient brief summary:  Genna Felix is a 61 y.o. female initiated on antimicrobial therapy with IV Vancomycin for treatment of suspected lower respiratory infection    Drug Allergies:   Review of patient's allergies indicates:  No Known Allergies    Actual Body Weight:   65.8 kg    Renal Function:   Estimated Creatinine Clearance: 102.5 mL/min (based on SCr of 0.5 mg/dL).,     Dialysis Method (if applicable):  N/A    CBC (last 72 hours):  Recent Labs   Lab Result Units 03/17/22  0843   WBC K/uL 3.72*   Hemoglobin g/dL 11.3*   Hematocrit % 37.3   Platelets K/uL 264   Gran % % 72.3   Lymph % % 16.9*   Mono % % 6.2   Eosinophil % % 4.0   Basophil % % 0.3   Differential Method  Automated       Metabolic Panel (last 72 hours):  Recent Labs   Lab Result Units 03/17/22  0843 03/17/22  1031   Sodium mmol/L 135*  --    Potassium mmol/L 4.6  --    Chloride mmol/L 103  --    CO2 mmol/L 22*  --    Glucose mg/dL 82  --    Glucose, UA   --  Negative   BUN mg/dL 25*  --    Creatinine mg/dL 0.5  --    Albumin g/dL 3.1*  --    Total Bilirubin mg/dL 1.0  --    Alkaline Phosphatase U/L 249*  --    AST U/L 54*  --    ALT U/L 98*  --    Magnesium mg/dL 1.8  --        Drug levels (last 3 results):  No results for input(s): VANCOMYCINRA, VANCOMYCINPE, VANCOMYCINTR in the last 72 hours.    Microbiologic Results:  Microbiology Results (last 7 days)       Procedure Component Value Units Date/Time    Culture, Respiratory with Gram Stain [[id number]]     Order Status: No result Specimen:  Respiratory     Blood culture x two cultures. Draw prior to antibiotics. [[id number]] Collected: 03/17/22 0854    Order Status: Sent Specimen: Blood from Peripheral, Right Hand Updated: 03/17/22 0855    Blood culture x two cultures. Draw prior to antibiotics. [[id number]] Collected: 03/17/22 0844    Order Status: Sent Specimen: Blood Updated: 03/17/22 0847

## 2022-03-18 PROBLEM — J18.9 PNEUMONIA OF RIGHT LOWER LOBE DUE TO INFECTIOUS ORGANISM: Status: ACTIVE | Noted: 2022-03-18

## 2022-03-18 PROBLEM — L89.014: Status: ACTIVE | Noted: 2022-03-18

## 2022-03-18 LAB
ALBUMIN SERPL BCP-MCNC: 2.4 G/DL (ref 3.5–5.2)
ALP SERPL-CCNC: 188 U/L (ref 55–135)
ALT SERPL W/O P-5'-P-CCNC: 71 U/L (ref 10–44)
ANION GAP SERPL CALC-SCNC: 8 MMOL/L (ref 8–16)
AST SERPL-CCNC: 34 U/L (ref 10–40)
BASOPHILS # BLD AUTO: 0.02 K/UL (ref 0–0.2)
BASOPHILS NFR BLD: 0.3 % (ref 0–1.9)
BILIRUB SERPL-MCNC: 0.9 MG/DL (ref 0.1–1)
BUN SERPL-MCNC: 16 MG/DL (ref 6–20)
CALCIUM SERPL-MCNC: 8.6 MG/DL (ref 8.7–10.5)
CHLORIDE SERPL-SCNC: 108 MMOL/L (ref 95–110)
CO2 SERPL-SCNC: 21 MMOL/L (ref 23–29)
CREAT SERPL-MCNC: 0.5 MG/DL (ref 0.5–1.4)
DIFFERENTIAL METHOD: ABNORMAL
EOSINOPHIL # BLD AUTO: 0.1 K/UL (ref 0–0.5)
EOSINOPHIL NFR BLD: 0.8 % (ref 0–8)
ERYTHROCYTE [DISTWIDTH] IN BLOOD BY AUTOMATED COUNT: 16.7 % (ref 11.5–14.5)
EST. GFR  (AFRICAN AMERICAN): >60 ML/MIN/1.73 M^2
EST. GFR  (NON AFRICAN AMERICAN): >60 ML/MIN/1.73 M^2
GLUCOSE SERPL-MCNC: 82 MG/DL (ref 70–110)
HCT VFR BLD AUTO: 27.4 % (ref 37–48.5)
HGB BLD-MCNC: 8.2 G/DL (ref 12–16)
IMM GRANULOCYTES # BLD AUTO: 0.02 K/UL (ref 0–0.04)
IMM GRANULOCYTES NFR BLD AUTO: 0.3 % (ref 0–0.5)
LYMPHOCYTES # BLD AUTO: 0.5 K/UL (ref 1–4.8)
LYMPHOCYTES NFR BLD: 7.6 % (ref 18–48)
MAGNESIUM SERPL-MCNC: 1.6 MG/DL (ref 1.6–2.6)
MCH RBC QN AUTO: 26 PG (ref 27–31)
MCHC RBC AUTO-ENTMCNC: 29.9 G/DL (ref 32–36)
MCV RBC AUTO: 87 FL (ref 82–98)
MONOCYTES # BLD AUTO: 0.4 K/UL (ref 0.3–1)
MONOCYTES NFR BLD: 5.7 % (ref 4–15)
NEUTROPHILS # BLD AUTO: 6 K/UL (ref 1.8–7.7)
NEUTROPHILS NFR BLD: 85.3 % (ref 38–73)
NRBC BLD-RTO: 0 /100 WBC
PHOSPHATE SERPL-MCNC: 4.2 MG/DL (ref 2.7–4.5)
PLATELET # BLD AUTO: 260 K/UL (ref 150–450)
PMV BLD AUTO: 11.2 FL (ref 9.2–12.9)
POCT GLUCOSE: 100 MG/DL (ref 70–110)
POCT GLUCOSE: 101 MG/DL (ref 70–110)
POCT GLUCOSE: 118 MG/DL (ref 70–110)
POCT GLUCOSE: 119 MG/DL (ref 70–110)
POTASSIUM SERPL-SCNC: 3.9 MMOL/L (ref 3.5–5.1)
PROT SERPL-MCNC: 6.8 G/DL (ref 6–8.4)
RBC # BLD AUTO: 3.15 M/UL (ref 4–5.4)
SODIUM SERPL-SCNC: 137 MMOL/L (ref 136–145)
VANCOMYCIN SERPL-MCNC: 8 UG/ML
WBC # BLD AUTO: 7.06 K/UL (ref 3.9–12.7)

## 2022-03-18 PROCEDURE — 95819 EEG AWAKE AND ASLEEP: CPT

## 2022-03-18 PROCEDURE — 87077 CULTURE AEROBIC IDENTIFY: CPT | Performed by: INTERNAL MEDICINE

## 2022-03-18 PROCEDURE — 87086 URINE CULTURE/COLONY COUNT: CPT | Performed by: INTERNAL MEDICINE

## 2022-03-18 PROCEDURE — 25000242 PHARM REV CODE 250 ALT 637 W/ HCPCS: Performed by: INTERNAL MEDICINE

## 2022-03-18 PROCEDURE — 87186 SC STD MICRODIL/AGAR DIL: CPT | Performed by: INTERNAL MEDICINE

## 2022-03-18 PROCEDURE — 95822 EEG COMA OR SLEEP ONLY: CPT | Mod: 26,,, | Performed by: PSYCHIATRY & NEUROLOGY

## 2022-03-18 PROCEDURE — 27000207 HC ISOLATION

## 2022-03-18 PROCEDURE — 95822 PR EEG,COMA/SLEEP RECORD ONLY: ICD-10-PCS | Mod: 26,,, | Performed by: PSYCHIATRY & NEUROLOGY

## 2022-03-18 PROCEDURE — 94003 VENT MGMT INPAT SUBQ DAY: CPT

## 2022-03-18 PROCEDURE — 87077 CULTURE AEROBIC IDENTIFY: CPT | Mod: 59 | Performed by: HOSPITALIST

## 2022-03-18 PROCEDURE — 87186 SC STD MICRODIL/AGAR DIL: CPT | Mod: 59 | Performed by: HOSPITALIST

## 2022-03-18 PROCEDURE — 80202 ASSAY OF VANCOMYCIN: CPT | Performed by: INTERNAL MEDICINE

## 2022-03-18 PROCEDURE — 20000000 HC ICU ROOM

## 2022-03-18 PROCEDURE — 94640 AIRWAY INHALATION TREATMENT: CPT

## 2022-03-18 PROCEDURE — 25000003 PHARM REV CODE 250: Performed by: HOSPITALIST

## 2022-03-18 PROCEDURE — 85025 COMPLETE CBC W/AUTO DIFF WBC: CPT | Performed by: INTERNAL MEDICINE

## 2022-03-18 PROCEDURE — 87184 SC STD DISK METHOD PER PLATE: CPT | Performed by: HOSPITALIST

## 2022-03-18 PROCEDURE — 27000221 HC OXYGEN, UP TO 24 HOURS

## 2022-03-18 PROCEDURE — 87070 CULTURE OTHR SPECIMN AEROBIC: CPT | Performed by: HOSPITALIST

## 2022-03-18 PROCEDURE — 99900035 HC TECH TIME PER 15 MIN (STAT)

## 2022-03-18 PROCEDURE — 99900026 HC AIRWAY MAINTENANCE (STAT)

## 2022-03-18 PROCEDURE — 99233 SBSQ HOSP IP/OBS HIGH 50: CPT | Mod: ,,, | Performed by: INTERNAL MEDICINE

## 2022-03-18 PROCEDURE — 63600175 PHARM REV CODE 636 W HCPCS: Performed by: INTERNAL MEDICINE

## 2022-03-18 PROCEDURE — 83735 ASSAY OF MAGNESIUM: CPT | Performed by: INTERNAL MEDICINE

## 2022-03-18 PROCEDURE — 99233 PR SUBSEQUENT HOSPITAL CARE,LEVL III: ICD-10-PCS | Mod: ,,, | Performed by: INTERNAL MEDICINE

## 2022-03-18 PROCEDURE — 25000003 PHARM REV CODE 250: Performed by: INTERNAL MEDICINE

## 2022-03-18 PROCEDURE — 84100 ASSAY OF PHOSPHORUS: CPT | Performed by: INTERNAL MEDICINE

## 2022-03-18 PROCEDURE — 80053 COMPREHEN METABOLIC PANEL: CPT | Performed by: INTERNAL MEDICINE

## 2022-03-18 PROCEDURE — 94761 N-INVAS EAR/PLS OXIMETRY MLT: CPT

## 2022-03-18 PROCEDURE — A4216 STERILE WATER/SALINE, 10 ML: HCPCS | Performed by: INTERNAL MEDICINE

## 2022-03-18 PROCEDURE — 87088 URINE BACTERIA CULTURE: CPT | Performed by: INTERNAL MEDICINE

## 2022-03-18 PROCEDURE — 63600175 PHARM REV CODE 636 W HCPCS: Performed by: HOSPITALIST

## 2022-03-18 RX ORDER — VANCOMYCIN HCL IN 5 % DEXTROSE 1G/250ML
1000 PLASTIC BAG, INJECTION (ML) INTRAVENOUS
Status: DISCONTINUED | OUTPATIENT
Start: 2022-03-18 | End: 2022-03-19

## 2022-03-18 RX ADMIN — MUPIROCIN: 20 OINTMENT TOPICAL at 08:03

## 2022-03-18 RX ADMIN — MEROPENEM 2 G: 1 INJECTION, POWDER, FOR SOLUTION INTRAVENOUS at 02:03

## 2022-03-18 RX ADMIN — DEXTROSE AND SODIUM CHLORIDE: 5; .9 INJECTION, SOLUTION INTRAVENOUS at 08:03

## 2022-03-18 RX ADMIN — ACETYLCYSTEINE 4 ML: 100 INHALANT RESPIRATORY (INHALATION) at 07:03

## 2022-03-18 RX ADMIN — DEXTROSE AND SODIUM CHLORIDE 75 ML/HR: 5; .9 INJECTION, SOLUTION INTRAVENOUS at 05:03

## 2022-03-18 RX ADMIN — SODIUM CHLORIDE, PRESERVATIVE FREE 10 ML: 5 INJECTION INTRAVENOUS at 12:03

## 2022-03-18 RX ADMIN — Medication 125 MG: at 09:03

## 2022-03-18 RX ADMIN — ACETAMINOPHEN 650 MG: 650 SOLUTION ORAL at 08:03

## 2022-03-18 RX ADMIN — MIDODRINE HYDROCHLORIDE 10 MG: 5 TABLET ORAL at 08:03

## 2022-03-18 RX ADMIN — IPRATROPIUM BROMIDE AND ALBUTEROL SULFATE 3 ML: 2.5; .5 SOLUTION RESPIRATORY (INHALATION) at 07:03

## 2022-03-18 RX ADMIN — MEROPENEM 2 G: 1 INJECTION, POWDER, FOR SOLUTION INTRAVENOUS at 05:03

## 2022-03-18 RX ADMIN — ENOXAPARIN SODIUM 40 MG: 40 INJECTION SUBCUTANEOUS at 04:03

## 2022-03-18 RX ADMIN — SODIUM CHLORIDE, PRESERVATIVE FREE 10 ML: 5 INJECTION INTRAVENOUS at 05:03

## 2022-03-18 RX ADMIN — MUPIROCIN: 20 OINTMENT TOPICAL at 12:03

## 2022-03-18 RX ADMIN — TRAZODONE HYDROCHLORIDE 25 MG: 50 TABLET ORAL at 08:03

## 2022-03-18 RX ADMIN — IPRATROPIUM BROMIDE AND ALBUTEROL SULFATE 3 ML: 2.5; .5 SOLUTION RESPIRATORY (INHALATION) at 12:03

## 2022-03-18 RX ADMIN — MEROPENEM 2 G: 1 INJECTION, POWDER, FOR SOLUTION INTRAVENOUS at 08:03

## 2022-03-18 RX ADMIN — ACETYLCYSTEINE 4 ML: 100 INHALANT RESPIRATORY (INHALATION) at 01:03

## 2022-03-18 RX ADMIN — Medication 125 MG: at 08:03

## 2022-03-18 RX ADMIN — IPRATROPIUM BROMIDE AND ALBUTEROL SULFATE 3 ML: 2.5; .5 SOLUTION RESPIRATORY (INHALATION) at 01:03

## 2022-03-18 RX ADMIN — ACETYLCYSTEINE 4 ML: 100 INHALANT RESPIRATORY (INHALATION) at 12:03

## 2022-03-18 RX ADMIN — MIDODRINE HYDROCHLORIDE 10 MG: 5 TABLET ORAL at 02:03

## 2022-03-18 RX ADMIN — VANCOMYCIN HYDROCHLORIDE 1000 MG: 1 INJECTION, POWDER, LYOPHILIZED, FOR SOLUTION INTRAVENOUS at 10:03

## 2022-03-18 NOTE — PROGRESS NOTES
Pharmacokinetic Assessment Follow Up: IV Vancomycin    Vancomycin serum concentration assessment(s):    The random level was drawn correctly and can be used to guide therapy at this time. The measurement is below the desired definitive target range of 15 to 20 mcg/mL.    Vancomycin Regimen Plan:    Continue regimen to Vancomycin 1000 mg IV every 18 hours with next serum trough concentration measured at approximately 2100 prior to third dose on 3/19.    Drug levels (last 3 results):  Recent Labs   Lab Result Units 03/18/22  0624   Vancomycin, Random ug/mL 8.0       Pharmacy will continue to follow and monitor vancomycin.    Please contact pharmacy at extension [phone] for questions regarding this assessment.    Thank you for the consult,   She Mars       Patient brief summary:  Genna Felix is a 61 y.o. female initiated on antimicrobial therapy with IV Vancomycin for treatment of lower respiratory infection.    Drug Allergies:   Review of patient's allergies indicates:  No Known Allergies    Actual Body Weight:   69 kg    Renal Function:   Estimated Creatinine Clearance: 105 mL/min (based on SCr of 0.5 mg/dL).,     Dialysis Method (if applicable):  N/A    CBC (last 72 hours):  Recent Labs   Lab Result Units 03/17/22  0843 03/18/22  0624   WBC K/uL 3.72* 7.06   Hemoglobin g/dL 11.3* 8.2*   Hematocrit % 37.3 27.4*   Platelets K/uL 264 260   Gran % % 72.3 85.3*   Lymph % % 16.9* 7.6*   Mono % % 6.2 5.7   Eosinophil % % 4.0 0.8   Basophil % % 0.3 0.3   Differential Method  Automated Automated       Metabolic Panel (last 72 hours):  Recent Labs   Lab Result Units 03/17/22  0843 03/17/22  1031 03/18/22  0624   Sodium mmol/L 135*  --  137   Potassium mmol/L 4.6  --  3.9   Chloride mmol/L 103  --  108   CO2 mmol/L 22*  --  21*   Glucose mg/dL 82  --  82   Glucose, UA   --  Negative  --    BUN mg/dL 25*  --  16   Creatinine mg/dL 0.5  --  0.5   Albumin g/dL 3.1*  --  2.4*   Total Bilirubin mg/dL 1.0  --  0.9    Alkaline Phosphatase U/L 249*  --  188*   AST U/L 54*  --  34   ALT U/L 98*  --  71*   Magnesium mg/dL 1.8  --  1.6   Phosphorus mg/dL  --   --  4.2       Vancomycin Administrations:  vancomycin given in the last 96 hours                     vancomycin 125 mg/5 mL oral solution 125 mg (mg) 125 mg Given 03/17/22 2214    vancomycin 1.25 g in dextrose 5% 250 mL IVPB (ready to mix) (mg) 1,250 mg New Bag 03/17/22 1100                    Microbiologic Results:  Microbiology Results (last 7 days)       Procedure Component Value Units Date/Time    Blood culture x two cultures. Draw prior to antibiotics. [[id number]] Collected: 03/17/22 0844    Order Status: Completed Specimen: Blood Updated: 03/18/22 0806     Blood Culture, Routine Gram stain aer bottle: Gram positive cocci in chains resembling Strep       Results called to and read back by: RASHEED LACEY RN  03/18/2022        08:06    Narrative:      Aerobic and anaerobic    Culture, Respiratory with Gram Stain [[id number]] Collected: 03/17/22 1337    Order Status: Completed Specimen: Respiratory from Tracheal Aspirate Updated: 03/18/22 0012     Gram Stain (Respiratory) Rare WBC's     Gram Stain (Respiratory) Many Gram positive cocci     Gram Stain (Respiratory) Many Gram negative rods    Blood culture x two cultures. Draw prior to antibiotics. [[id number]] Collected: 03/17/22 0854    Order Status: Completed Specimen: Blood from Peripheral, Right Hand Updated: 03/17/22 1945     Blood Culture, Routine No Growth to date    Narrative:      Aerobic and anaerobic

## 2022-03-18 NOTE — PLAN OF CARE
POC reviewed w/ pt. Pt unable to participate. Pt nonverbal, trach'd and on vent. Continued need of ishan hugger. G-tube patent - T at 30cc/hr and slowly increasing to goal of 50cc/hr due to emesis episode last night, tolerating so far. Bili tube w/ no output.  R elbow pressure injury assessed by wound care - redressed and cultures sent, stage 4 injury. Positive blood culture result this am - vanc and merrem ordered. [place] in place - urine culture sent today, urine output on low side of adequate (approx 40cc/hr). EEG done today - not read yet. VSS.     Problem: Adult Inpatient Plan of Care  Goal: Absence of Hospital-Acquired Illness or Injury  Outcome: Ongoing, Progressing  Goal: Optimal Comfort and Wellbeing  Outcome: Ongoing, Progressing     Problem: Aspiration (Enteral Nutrition)  Goal: Absence of Aspiration Signs and Symptoms  Outcome: Ongoing, Progressing     Problem: Feeding Intolerance (Enteral Nutrition)  Goal: Feeding Tolerance  Outcome: Ongoing, Progressing     Problem: Skin Injury Risk Increased  Goal: Skin Health and Integrity  Outcome: Ongoing, Progressing     Problem: Adjustment to Illness (Sepsis/Septic Shock)  Goal: Optimal Coping  Outcome: Ongoing, Progressing

## 2022-03-18 NOTE — CONSULTS
Consulted for skin integrity issues to the right elbow. Upon assessment noted a stage 4 pressure wound to the right elbow with palpable bone. There was biofilm noted to the entire wound bed which was cleaned off with wound cleanser. Wet to dry dressing applied at this time. Orders obtained for santyl with gauze dressing to start tomorrow. Patient with some redness to her perineal area and to her buttocks related to incontinence associated skin dermatitis. Barrier cream (calmoseptine) applied by the patients nurse. Bilarteral feet and heels assessed with unstageable areas noted to bilateral great toes. Cleaned areas and applied Triad hydrophilic wound dressing and left open to air. Bilateral quilted heel protectors in place. Patient being turned and repositioned q 2 hours. Plan of care discussed with the patients nurse. Wound care to continue to follow this patient.       Right elbow stage 4 measures 3.2cm x  2.5cm x  0.8cm

## 2022-03-18 NOTE — RESPIRATORY THERAPY
Patient remains on NKV-550 on AC rate 28. , Peep +5 and Fi02 .28.  Hr 106, ETC02 30mmhg and 92 saturation 100%.  Patient trached via 8 portex with 70iiz33 cuff psi.  Patient receiving aerosol tx via duoneb now and q6hr and 4ml 10% mucomyst now and q6hr.  Ambu bag and mask at bedside with alarms on and functioning properly at this time.

## 2022-03-18 NOTE — PROGRESS NOTES
"  03/18/2022      Admit Date: 3/17/2022  Genna Felix  New Patient Consult    Chief Complaint   Patient presents with    hypothermia     Pt sent by [place] for hypothermia, nursing home staff reported to EMS t6hat pt warming blanket was not working       History of Present Illness:  Pt not arousing, with tactile stimuli her upper right gaze goes to left upper gaze.  Contracted.  On vent    Pt had prior pseudomonas , esbl klebsiella, and serratia  in sputum Jan 2022.  Review of record indicates h/o sz, cirrhosis, anoxic brain injury,   c diff, chr carroll.    Pt was NSR for  3 days 1/24/2022 -- Hospital Course:   Patient is a [place] patient was unfortunately the victim of anoxic brain injury and persistent vegetative state who presented to the hospital with hypothermia.  She underwent evaluation for sepsis and started on broad-spectrum antimicrobial therapy.  She underwent further evaluation cause of, including CT scan of the abdomen as she had a recent cholecystostomy tube, as well as chest x-ray.  Likely etiology of her sepsis was secondary to pneumonia.  She improved with broad-spectrum antimicrobial therapy, and was discharged to long-term acute care to completed full course of 10 days of IV antibiotics.  Goals of care discussion was had with the patient's family, who opted for DNR, but continuation of the current treatment.       From ERP, Dr Nino hpi today:Genna Felix is a 61 y.o. female who presents to the ED via EMS from [place] with an onset of hypothermia.  Patient was atop of a thermal blanket in order to regulate her temperature, but the blanket malfunctioned throughout the night causing patient's temperature to drop.  She presented to the ED with a rectal temp of 89 °F.  EMS notes the patient has a "blood shot" left eye and a steady "upward gaze."  Patient presents with a drain to the RUQ, booties to bilateral feet, trach in place and Carroll catheter, per EMS.  They note patient is a "full code" " and responds with a jump to startling noises only.  No other symptoms reported at this time.  Patient has a Hx of respiratory distress, hemangioma of intra-abdominal structure, tracheostomy dependence, anoxic brain damage, PE, HTN, protein claorie malnutrition, PEG tube placement, bed bound, total self-care deficit, nursing home resident, and cardiac arrest.  PSHx includes PEG with tracheostomy placement.  HPI is limited secondary to nonverbal patient.          Progress Note  PULMONARY    Admit Date: 3/17/2022   03/18/2022      SUBJECTIVE:     3/18 not arousing, more animated      PFSH and Allergies reviewed.    OBJECTIVE:     Vitals (Most recent):  Vitals:    03/18/22 0745   BP:    Pulse: (!) 124   Resp: (!) 28   Temp: 98.42 °F (36.9 °C)       Vitals (24 hour range):  Temp:  [89.6 °F (32 °C)-100.6 °F (38.1 °C)]   Pulse:  []   Resp:  [4-91]   BP: ()/(47-89)   SpO2:  [96 %-100 %]       Intake/Output Summary (Last 24 hours) at 3/18/2022 0849  Last data filed at 3/18/2022 0638  Gross per 24 hour   Intake 1503.75 ml   Output 1550 ml   Net -46.25 ml          Physical Exam:  The patient's neuro status (alertness,orientation,cognitive function,motor skills,), pharyngeal exam (oral lesions, hygiene, abn dentition,), Neck (jvd,mass,thyroid,nodes in neck and above/below clavicle),RESPIRATORY(symmetry,effort,fremitus,percussion,auscultation),  Cor(rhythm,heart tones including gallops,perfusion,edema)ABD(distention,hepatic&splenomegaly,tenderness,masses), Skin(rash,cyanosis),Psyc(affect,judgement,).  Exam negative except for these pertinent findings:    Less upward gaze and moves spontaneously today (required stimuli 3/17), chest is symmetric, no distress, normal percussion, normal fremitus and good normal breath sounds  Diffuse puffy edema    Radiographs reviewed: view by direct vision    Results for orders placed during the hospital encounter of 01/03/22    X-Ray Chest 1 View    Narrative  EXAMINATION:  XR CHEST 1  VIEW    CLINICAL HISTORY:  aspiration;    TECHNIQUE:  Single frontal view of the chest was performed.    COMPARISON:  01/09/2022    FINDINGS:  A tracheostomy cannula is present with the heart size is normal.  There is patchy airspace disease in the right mid to lower lung zone without significant change.  This is superimposed on generalized prominence of interstitial markings.  There is no pleural effusion or pneumothorax.    Impression  Right lung infiltrate without significant change.      Electronically signed by: Salvador Smith MD  Date:    01/10/2022  Time:    15:25  ]    Labs     Recent Labs   Lab 03/18/22  0624   WBC 7.06   HGB 8.2*   HCT 27.4*        Recent Labs   Lab 03/17/22  1322 03/18/22  0624   NA  --  137   K  --  3.9   CL  --  108   CO2  --  21*   BUN  --  16   CREATININE  --  0.5   GLU  --  82   CALCIUM  --  8.6*   MG  --  1.6   PHOS  --  4.2   AST  --  34   ALT  --  71*   ALKPHOS  --  188*   BILITOT  --  0.9   PROT  --  6.8   ALBUMIN  --  2.4*   LACTATE 1.0  --    No results for input(s): PH, PCO2, PO2, HCO3 in the last 24 hours.  Microbiology Results (last 7 days)     Procedure Component Value Units Date/Time    Urine Culture High Risk [[id number]]     Order Status: No result Specimen: Urine, Catheterized     Blood culture x two cultures. Draw prior to antibiotics. [[id number]] Collected: 03/17/22 0844    Order Status: Completed Specimen: Blood Updated: 03/18/22 0806     Blood Culture, Routine Gram stain aer bottle: Gram positive cocci in chains resembling Strep       Results called to and read back by: RASHEED LACEY RN  03/18/2022        08:06    Narrative:      Aerobic and anaerobic    Culture, Respiratory with Gram Stain [[id number]] Collected: 03/17/22 1337    Order Status: Completed Specimen: Respiratory from Tracheal Aspirate Updated: 03/18/22 0012     Gram Stain (Respiratory) Rare WBC's     Gram Stain (Respiratory) Many Gram positive cocci     Gram Stain (Respiratory) Many Gram  negative rods    Blood culture x two cultures. Draw prior to antibiotics. 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             .        Plan: t min 89, p 58 to 107, merrem/vanc, midodrine, lovenox 40/d,  28% ox,wbc 3.7, procal 0.04, u/a pos for blood and wbc, cxr min rll opacification-- ct abd and cxr 1/26 with rll density from apparent atelectasis.     Urine culture positive for proteus 1/5/2022 s to merrem.  Not clear if had c diff prophylaxsis recent admits but  c diff Pos May 2021.    I do not see clear pneumonia with cxr pattern and procal.  Would monitor on merrem.       H/o prior c diff -- consider prophylaxsis for c diff?  Monitor.    3/18 tm 100.6, p 124,   I/o 1503/1550  merrem and vanc  lovenox       U/a abn,procal low and cxr stable from Jan-- would rec urine culture if not done    Pt called pneumonia but findings are minimal  -- await cultures, adjust abx, return to [place] when stable.

## 2022-03-18 NOTE — PLAN OF CARE
Pt continues on current POC. LOLLY TLC PICC patent. Hamilton patent with dark tiffanie urine. Tube feeds held due to patient vomiting. Safety measure active, will continue to monitor.   Problem: Adult Inpatient Plan of Care  Goal: Plan of Care Review  Outcome: Ongoing, Progressing  Goal: Patient-Specific Goal (Individualized)  Outcome: Ongoing, Progressing  Goal: Absence of Hospital-Acquired Illness or Injury  Outcome: Ongoing, Progressing  Goal: Optimal Comfort and Wellbeing  Outcome: Ongoing, Progressing  Goal: Readiness for Transition of Care  Outcome: Ongoing, Progressing     Problem: Aspiration (Enteral Nutrition)  Goal: Absence of Aspiration Signs and Symptoms  Outcome: Ongoing, Progressing     Problem: Device-Related Complication Risk (Enteral Nutrition)  Goal: Safe, Effective Therapy Delivery  Outcome: Ongoing, Progressing     Problem: Feeding Intolerance (Enteral Nutrition)  Goal: Feeding Tolerance  Outcome: Ongoing, Progressing     Problem: Skin Injury Risk Increased  Goal: Skin Health and Integrity  Outcome: Ongoing, Progressing     Problem: Adjustment to Illness (Sepsis/Septic Shock)  Goal: Optimal Coping  Outcome: Ongoing, Progressing     Problem: Bleeding (Sepsis/Septic Shock)  Goal: Absence of Bleeding  Outcome: Ongoing, Progressing     Problem: Glycemic Control Impaired (Sepsis/Septic Shock)  Goal: Blood Glucose Level Within Desired Range  Outcome: Ongoing, Progressing     Problem: Infection Progression (Sepsis/Septic Shock)  Goal: Absence of Infection Signs and Symptoms  Outcome: Ongoing, Progressing     Problem: Nutrition Impaired (Sepsis/Septic Shock)  Goal: Optimal Nutrition Intake  Outcome: Ongoing, Progressing     Problem: Impaired Wound Healing  Goal: Optimal Wound Healing  Outcome: Ongoing, Progressing     Problem: Communication Impairment (Mechanical Ventilation, Invasive)  Goal: Effective Communication  Outcome: Ongoing, Progressing     Problem: Device-Related Complication Risk (Mechanical  Ventilation, Invasive)  Goal: Optimal Device Function  Outcome: Ongoing, Progressing     Problem: Inability to Wean (Mechanical Ventilation, Invasive)  Goal: Mechanical Ventilation Liberation  Outcome: Ongoing, Progressing     Problem: Nutrition Impairment (Mechanical Ventilation, Invasive)  Goal: Optimal Nutrition Delivery  Outcome: Ongoing, Progressing     Problem: Skin and Tissue Injury (Mechanical Ventilation, Invasive)  Goal: Absence of Device-Related Skin and Tissue Injury  Outcome: Ongoing, Progressing     Problem: Ventilator-Induced Lung Injury (Mechanical Ventilation, Invasive)  Goal: Absence of Ventilator-Induced Lung Injury  Outcome: Ongoing, Progressing     Problem: Communication Impairment (Artificial Airway)  Goal: Effective Communication  Outcome: Ongoing, Progressing     Problem: Device-Related Complication Risk (Artificial Airway)  Goal: Optimal Device Function  Outcome: Ongoing, Progressing     Problem: Skin and Tissue Injury (Artificial Airway)  Goal: Absence of Device-Related Skin or Tissue Injury  Outcome: Ongoing, Progressing     Problem: Noninvasive Ventilation Acute  Goal: Effective Unassisted Ventilation and Oxygenation  Outcome: Ongoing, Progressing     Problem: Fall Injury Risk  Goal: Absence of Fall and Fall-Related Injury  Outcome: Ongoing, Progressing     Problem: Infection  Goal: Absence of Infection Signs and Symptoms  Outcome: Ongoing, Progressing

## 2022-03-18 NOTE — CARE UPDATE
03/17/22 1930   Patient Assessment/Suction   Level of Consciousness (AVPU) unresponsive   Respiratory Effort Unlabored   Expansion/Accessory Muscles/Retractions no use of accessory muscles   All Lung Fields Breath Sounds coarse;diminished   MAYNOR Breath Sounds coarse   LLL Breath Sounds coarse;diminished   RUL Breath Sounds coarse   RML Breath Sounds coarse   RLL Breath Sounds coarse;diminished   Rhythm/Pattern, Respiratory assisted mechanically   Cough Frequency no cough   Cough Type assisted   Suction Method oral;tracheal   $ Suction Charges Inline Suction Procedure Stat Charge   Secretions Amount moderate   Secretions Color yellow   Secretions Characteristics thick   PRE-TX-O2   O2 Device (Oxygen Therapy) ventilator   $ Is the patient on Low Flow Oxygen? Yes   Oxygen Concentration (%) 28   SpO2 98 %   Pulse Oximetry Type Continuous   $ Pulse Oximetry - Multiple Charge Pulse Oximetry - Multiple   Pulse (!) 138   Resp (!) 32   Temp 100.04 °F (37.8 °C)   ETCO2   $ ETCO2 Usage Currently wearing   ETCO2 (mmHg) 28 mmHg   ETCO2 Device Type Bedside Monitor;Ventilator;Artificial Airway   Aerosol Therapy   $ Aerosol Therapy Charges Aerosol Treatment   Respiratory Treatment Status (SVN) given   Treatment Route (SVN) in-line;ventilator   Patient Position (SVN) HOB elevated   Post Treatment Assessment (SVN) breath sounds unchanged   Signs of Intolerance (SVN) none   Breath Sounds Post-Respiratory Treatment   Throughout All Fields Post-Treatment All Fields   Throughout All Fields Post-Treatment no change   Post-treatment Heart Rate (beats/min) 139   Post-treatment Resp Rate (breaths/min) 32   Skin Integrity   $ Wound Care Tech Time 15 min   Area Observed Neck;Neck under tracheostomy   Skin Appearance without discoloration   Barrier used? Other (see comments)  (drain sponge)        Surgical Airway Portex Cuffed   No placement date or time found.   Present Prior to Hospital Arrival?: Yes  Brand: Portex  Airway Device Size: 8.0   Style: Cuffed   Cuff Pressure 25 cm H2O   Cuff Inflation? Inflated   Speaking Valve Usage Not wearing   Status Secured   Site Assessment Drainage   Airway Safety   Ambu bag with the patient? Yes, Adult Ambu   Is mask with the patient? Yes, Adult Mask   Vent Select   Conventional Vent Y   Charged w/in last 24h YES   Preset Conventional Ventilator Settings   Vent Type NKV-550   Ventilation Type VC   Vent Mode A/CMV-VC   Humidity HME   Set Rate 14 BPM   Vt Set 400 mL   PEEP/CPAP 3.9 cmH20   Waveform RAMP   I-Trigger Type  Flow   Trigger Sensitivity Flow/I-Trigger 1.5 L/min   Patient Ventilator Parameters   Resp Rate Total 32 br/min   Peak Airway Pressure 29.1 cmH2O   Mean Airway Pressure 14.5 cmH20   Plateau Pressure 23.8 cmH20   Exhaled Vt 346 mL   Total Ve 9.16 mL   I:E Ratio Measured 1.1:1   Tubing ID (mm) 8 mm   Tube Type Trach   Inspired Tidal Volume (VTI) 392 mL   Conventional Ventilator Alarms   Alarms On Y   Ve High Alarm 15 L/min   Ve Low Alarm 3 L/min   Vt High Alarm 15 mL   Vt Low Alarm 1 mL   Resp Rate High Alarm 40 br/min   Press High Alarm 50 cmH2O   Apnea Rate 15   Apnea Volume (mL) 300 mL   Delay Alarm (Sec) 30 sec(s)   Ready to Wean/Extubation Screen   FIO2<=50 (chart decimal) 0.28   MV<16L (chart vol.) 9.16   PEEP <=8 (chart #) 3.9   Ready to Wean Parameters   F/VT Ratio<105 (RSBI) (!) 92.49

## 2022-03-19 LAB
POCT GLUCOSE: 115 MG/DL (ref 70–110)
POCT GLUCOSE: 126 MG/DL (ref 70–110)
POCT GLUCOSE: 133 MG/DL (ref 70–110)
POCT GLUCOSE: 138 MG/DL (ref 70–110)
POCT GLUCOSE: 154 MG/DL (ref 70–110)
VANCOMYCIN TROUGH SERPL-MCNC: 10.9 UG/ML (ref 10–22)

## 2022-03-19 PROCEDURE — 80202 ASSAY OF VANCOMYCIN: CPT | Performed by: HOSPITALIST

## 2022-03-19 PROCEDURE — 63600175 PHARM REV CODE 636 W HCPCS: Performed by: INTERNAL MEDICINE

## 2022-03-19 PROCEDURE — 94761 N-INVAS EAR/PLS OXIMETRY MLT: CPT

## 2022-03-19 PROCEDURE — 99900026 HC AIRWAY MAINTENANCE (STAT)

## 2022-03-19 PROCEDURE — 25000242 PHARM REV CODE 250 ALT 637 W/ HCPCS: Performed by: INTERNAL MEDICINE

## 2022-03-19 PROCEDURE — 27200966 HC CLOSED SUCTION SYSTEM

## 2022-03-19 PROCEDURE — 25000003 PHARM REV CODE 250: Performed by: INTERNAL MEDICINE

## 2022-03-19 PROCEDURE — 27000207 HC ISOLATION

## 2022-03-19 PROCEDURE — 99900022

## 2022-03-19 PROCEDURE — 94003 VENT MGMT INPAT SUBQ DAY: CPT

## 2022-03-19 PROCEDURE — A4216 STERILE WATER/SALINE, 10 ML: HCPCS | Performed by: INTERNAL MEDICINE

## 2022-03-19 PROCEDURE — 25000003 PHARM REV CODE 250: Performed by: HOSPITALIST

## 2022-03-19 PROCEDURE — 63600175 PHARM REV CODE 636 W HCPCS: Performed by: HOSPITALIST

## 2022-03-19 PROCEDURE — 99232 SBSQ HOSP IP/OBS MODERATE 35: CPT | Mod: ,,, | Performed by: INTERNAL MEDICINE

## 2022-03-19 PROCEDURE — 94640 AIRWAY INHALATION TREATMENT: CPT

## 2022-03-19 PROCEDURE — 20000000 HC ICU ROOM

## 2022-03-19 PROCEDURE — 99232 PR SUBSEQUENT HOSPITAL CARE,LEVL II: ICD-10-PCS | Mod: ,,, | Performed by: INTERNAL MEDICINE

## 2022-03-19 PROCEDURE — 99900035 HC TECH TIME PER 15 MIN (STAT)

## 2022-03-19 PROCEDURE — 27000221 HC OXYGEN, UP TO 24 HOURS

## 2022-03-19 RX ADMIN — MIDODRINE HYDROCHLORIDE 10 MG: 5 TABLET ORAL at 09:03

## 2022-03-19 RX ADMIN — MEROPENEM 2 G: 1 INJECTION, POWDER, FOR SOLUTION INTRAVENOUS at 09:03

## 2022-03-19 RX ADMIN — SODIUM CHLORIDE, PRESERVATIVE FREE 10 ML: 5 INJECTION INTRAVENOUS at 05:03

## 2022-03-19 RX ADMIN — SODIUM CHLORIDE, PRESERVATIVE FREE 10 ML: 5 INJECTION INTRAVENOUS at 12:03

## 2022-03-19 RX ADMIN — Medication 125 MG: at 09:03

## 2022-03-19 RX ADMIN — ACETYLCYSTEINE 4 ML: 100 INHALANT RESPIRATORY (INHALATION) at 07:03

## 2022-03-19 RX ADMIN — SODIUM CHLORIDE, PRESERVATIVE FREE 10 ML: 5 INJECTION INTRAVENOUS at 06:03

## 2022-03-19 RX ADMIN — MUPIROCIN: 20 OINTMENT TOPICAL at 09:03

## 2022-03-19 RX ADMIN — COLLAGENASE SANTYL: 250 OINTMENT TOPICAL at 10:03

## 2022-03-19 RX ADMIN — MEROPENEM 2 G: 1 INJECTION, POWDER, FOR SOLUTION INTRAVENOUS at 05:03

## 2022-03-19 RX ADMIN — IPRATROPIUM BROMIDE AND ALBUTEROL SULFATE 3 ML: 2.5; .5 SOLUTION RESPIRATORY (INHALATION) at 12:03

## 2022-03-19 RX ADMIN — IPRATROPIUM BROMIDE AND ALBUTEROL SULFATE 3 ML: 2.5; .5 SOLUTION RESPIRATORY (INHALATION) at 07:03

## 2022-03-19 RX ADMIN — VANCOMYCIN HYDROCHLORIDE 1250 MG: 1.25 INJECTION, POWDER, LYOPHILIZED, FOR SOLUTION INTRAVENOUS at 10:03

## 2022-03-19 RX ADMIN — MORPHINE SULFATE 3 MG: 4 INJECTION INTRAVENOUS at 12:03

## 2022-03-19 RX ADMIN — DEXTROSE AND SODIUM CHLORIDE: 5; .9 INJECTION, SOLUTION INTRAVENOUS at 10:03

## 2022-03-19 RX ADMIN — MEROPENEM 2 G: 1 INJECTION, POWDER, FOR SOLUTION INTRAVENOUS at 01:03

## 2022-03-19 RX ADMIN — MIDODRINE HYDROCHLORIDE 10 MG: 5 TABLET ORAL at 03:03

## 2022-03-19 RX ADMIN — VANCOMYCIN HYDROCHLORIDE 1000 MG: 1 INJECTION, POWDER, LYOPHILIZED, FOR SOLUTION INTRAVENOUS at 03:03

## 2022-03-19 RX ADMIN — ENOXAPARIN SODIUM 40 MG: 40 INJECTION SUBCUTANEOUS at 04:03

## 2022-03-19 RX ADMIN — DEXTROSE AND SODIUM CHLORIDE: 5; .9 INJECTION, SOLUTION INTRAVENOUS at 09:03

## 2022-03-19 RX ADMIN — ACETYLCYSTEINE 4 ML: 100 INHALANT RESPIRATORY (INHALATION) at 12:03

## 2022-03-19 NOTE — ASSESSMENT & PLAN NOTE
Continue mechanical ventilator support.  Monitor oximetry readings.  Consulting with pulmonologist to help with management.

## 2022-03-19 NOTE — SUBJECTIVE & OBJECTIVE
Interval History:   Last night, had some emesis.  Tube feeds were held and then restarted at slower rate.  Otherwise, no changes in status.    Review of Systems   Unable to perform ROS: Patient unresponsive   Objective:     Vital Signs (Most Recent):  Temp: 98.96 °F (37.2 °C) (03/18/22 2030)  Pulse: (!) 114 (03/18/22 2030)  Resp: (!) 34 (03/18/22 2030)  BP: (!) 111/55 (03/18/22 2030)  SpO2: 100 % (03/18/22 2030)   Vital Signs (24h Range):  Temp:  [94.6 °F (34.8 °C)-100.04 °F (37.8 °C)] 98.96 °F (37.2 °C)  Pulse:  [] 114  Resp:  [4-79] 34  SpO2:  [98 %-100 %] 100 %  BP: ()/(47-69) 111/55     Weight: 69 kg (152 lb 1.9 oz)  Body mass index is 29.71 kg/m².    Intake/Output Summary (Last 24 hours) at 3/18/2022 2127  Last data filed at 3/18/2022 1931  Gross per 24 hour   Intake 2783.8 ml   Output 850 ml   Net 1933.8 ml      Physical Exam  Constitutional:       General: She is not in acute distress.  Eyes:      General:         Right eye: No discharge.         Left eye: No discharge.   Neck:      Vascular: No JVD.   Cardiovascular:      Rate and Rhythm: Regular rhythm. Tachycardia present.   Pulmonary:      Breath sounds: Normal breath sounds.      Comments: Tracheostomy connected to ventilator  Abdominal:      General: Abdomen is flat. Bowel sounds are normal. There is no distension.      Palpations: Abdomen is soft.   Musculoskeletal:      Right lower leg: No edema.      Left lower leg: No edema.   Skin:     General: Skin is warm and moist.      Findings: No rash.   Neurological:      Mental Status: She is alert.      Comments: Not responding to questions.  Not following commands.  She has an upward gaze.       Significant Labs: All pertinent labs within the past 24 hours have been reviewed.    Significant Imaging: I have reviewed all pertinent imaging results/findings within the past 24 hours.

## 2022-03-19 NOTE — ASSESSMENT & PLAN NOTE
"Was present on day 1 of admission.  Continue antibiotics.  Monitor for improvement.    Antibiotics (From admission, onward)            Start     Stop Route Frequency Ordered    03/18/22 1000  vancomycin in dextrose 5 % 1 gram/250 mL IVPB 1,000 mg         -- IV Every 18 hours 03/18/22 0842    03/17/22 2300  mupirocin 2 % ointment         03/22 2059 Nasl 2 times daily 03/17/22 2151 03/17/22 2100  vancomycin 125 mg/5 mL oral solution 125 mg         -- PER G TUBE 2 times daily 03/17/22 2008 03/17/22 1900  meropenem (MERREM) 2 g in sodium chloride 0.9% 100 mL IVPB         -- IV Every 8 hours (non-standard times) 03/17/22 1007    03/17/22 1105  vancomycin - pharmacy to dose  (vancomycin IVPB)        "And" Linked Group Details    -- IV pharmacy to manage frequency 03/17/22 1007            "

## 2022-03-19 NOTE — ASSESSMENT & PLAN NOTE
Was present on hospital day 1.  Wound care nurse evaluated it and recommended methods of treatment.

## 2022-03-19 NOTE — ASSESSMENT & PLAN NOTE
Patient's anemia is currently controlled. Has not received any PRBCs to date.. Etiology likely d/t chronic disease  Current CBC reviewed-   Lab Results   Component Value Date    HGB 8.2 (L) 03/18/2022    HCT 27.4 (L) 03/18/2022     Monitor serial CBC and transfuse if patient becomes hemodynamically unstable, symptomatic or H/H drops below 7/21.

## 2022-03-19 NOTE — PLAN OF CARE
POC reviewed. Nonverbal, reacts to voice/pain. Trach, vent dependent. PEG in place, patent. Tolerating tube feeds, no episodes of vomiting - minimal residuals; tube feeds now at goal rate. No output from bili drain. No BM. Hamilton catheter in place, AUOP. Blood glucose monitored. NAEON. Safety and isolation precautions in place.

## 2022-03-19 NOTE — ASSESSMENT & PLAN NOTE
Chronic.  Liver functions appear stable.    Lab Results   Component Value Date    ALT 71 (H) 03/18/2022    AST 34 03/18/2022    ALKPHOS 188 (H) 03/18/2022    BILITOT 0.9 03/18/2022

## 2022-03-19 NOTE — ASSESSMENT & PLAN NOTE
Pt has sepsis due to pneumonia.  No signs of organ damage.  She's on IVAB to get source control.

## 2022-03-19 NOTE — PROGRESS NOTES
[facility] Hospital Medicine  Progress Note    Patient Name: Genna Felix  MRN: [id number]  Patient Class: IP- Inpatient   Admission Date: 3/17/2022  Length of Stay: 1 days  Attending Physician: Lanre Goss MD  Primary Care Provider: Primary Doctor No        Subjective:     Principal Problem:Pneumonia of right lower lobe due to infectious organism        HPI:  No notes on file    Overview/Hospital Course:  No notes on file    Interval History:   Last night, had some emesis.  Tube feeds were held and then restarted at slower rate.  Otherwise, no changes in status.    Review of Systems   Unable to perform ROS: Patient unresponsive   Objective:     Vital Signs (Most Recent):  Temp: 98.96 °F (37.2 °C) (03/18/22 2030)  Pulse: (!) 114 (03/18/22 2030)  Resp: (!) 34 (03/18/22 2030)  BP: (!) 111/55 (03/18/22 2030)  SpO2: 100 % (03/18/22 2030)   Vital Signs (24h Range):  Temp:  [94.6 °F (34.8 °C)-100.04 °F (37.8 °C)] 98.96 °F (37.2 °C)  Pulse:  [] 114  Resp:  [4-79] 34  SpO2:  [98 %-100 %] 100 %  BP: ()/(47-69) 111/55     Weight: 69 kg (152 lb 1.9 oz)  Body mass index is 29.71 kg/m².    Intake/Output Summary (Last 24 hours) at 3/18/2022 2127  Last data filed at 3/18/2022 1931  Gross per 24 hour   Intake 2783.8 ml   Output 850 ml   Net 1933.8 ml      Physical Exam  Constitutional:       General: She is not in acute distress.  Eyes:      General:         Right eye: No discharge.         Left eye: No discharge.   Neck:      Vascular: No JVD.   Cardiovascular:      Rate and Rhythm: Regular rhythm. Tachycardia present.   Pulmonary:      Breath sounds: Normal breath sounds.      Comments: Tracheostomy connected to ventilator  Abdominal:      General: Abdomen is flat. Bowel sounds are normal. There is no distension.      Palpations: Abdomen is soft.   Musculoskeletal:      Right lower leg: No edema.      Left lower leg: No edema.   Skin:     General: Skin is warm and moist.      Findings: No  "rash.   Neurological:      Mental Status: She is alert.      Comments: Not responding to questions.  Not following commands.  She has an upward gaze.       Significant Labs: All pertinent labs within the past 24 hours have been reviewed.    Significant Imaging: I have reviewed all pertinent imaging results/findings within the past 24 hours.      Assessment/Plan:      * Pneumonia of right lower lobe due to infectious organism  Was present on day 1 of admission.  Continue antibiotics.  Monitor for improvement.    Antibiotics (From admission, onward)            Start     Stop Route Frequency Ordered    03/18/22 1000  vancomycin in dextrose 5 % 1 gram/250 mL IVPB 1,000 mg         -- IV Every 18 hours 03/18/22 0842    03/17/22 2300  mupirocin 2 % ointment         03/22 2059 Nasl 2 times daily 03/17/22 2151 03/17/22 2100  vancomycin 125 mg/5 mL oral solution 125 mg         -- PER G TUBE 2 times daily 03/17/22 2008 03/17/22 1900  meropenem (MERREM) 2 g in sodium chloride 0.9% 100 mL IVPB         -- IV Every 8 hours (non-standard times) 03/17/22 1007    03/17/22 1105  vancomycin - pharmacy to dose  (vancomycin IVPB)        "And" Linked Group Details    -- IV pharmacy to manage frequency 03/17/22 1007              Pressure injury of right elbow, stage 4  Was present on hospital day 1.  Wound care nurse evaluated it and recommended methods of treatment.      Cirrhosis of liver  Chronic.  Liver functions appear stable.    Lab Results   Component Value Date    ALT 71 (H) 03/18/2022    AST 34 03/18/2022    ALKPHOS 188 (H) 03/18/2022    BILITOT 0.9 03/18/2022           Acute on chronic respiratory failure with hypoxia  Continue mechanical ventilator support.  Monitor oximetry readings.  Consulting with pulmonologist to help with management.    Tracheostomy dependence  Chronic.  Continue routine care.      Acute cystitis without hematuria  Urine sample only with 5 WBC per hpf.  Cystitis ruled out.    Sepsis  Pt has sepsis " due to pneumonia.  No signs of organ damage.  She's on IVAB to get source control.      Persistent vegetative state  Chronic.      Anemia of chronic disease  Patient's anemia is currently controlled. Has not received any PRBCs to date.. Etiology likely d/t chronic disease  Current CBC reviewed-   Lab Results   Component Value Date    HGB 8.2 (L) 03/18/2022    HCT 27.4 (L) 03/18/2022     Monitor serial CBC and transfuse if patient becomes hemodynamically unstable, symptomatic or H/H drops below 7/21.           VTE Risk Mitigation (From admission, onward)         Ordered     enoxaparin injection 40 mg  Daily         03/17/22 1013     IP VTE HIGH RISK PATIENT  Once         03/17/22 1013     Place sequential compression device  Until discontinued         03/17/22 1013                Discharge Planning   [place]: 3/21/2022     Code Status: Full Code   Is the patient medically ready for discharge?:     Reason for patient still in hospital (select all that apply): Patient trending condition and Treatment  Discharge Plan A: Return to nursing home            Lanre Goss MD  Department of Hospital Medicine   [facility]

## 2022-03-19 NOTE — PROGRESS NOTES
"  03/19/2022      Admit Date: 3/17/2022  Genna Felix  New Patient Consult    Chief Complaint   Patient presents with    hypothermia     Pt sent by [facility] for hypothermia, nursing home staff reported to EMS t6hat pt warming blanket was not working       History of Present Illness:  Pt not arousing, with tactile stimuli her upper right gaze goes to left upper gaze.  Contracted.  On vent    Pt had prior pseudomonas , esbl klebsiella, and serratia  in sputum Jan 2022.  Review of record indicates h/o sz, cirrhosis, anoxic brain injury,   c diff, chr carroll.    Pt was NSR for  3 days 1/24/2022 -- Hospital Course:   Patient is a [place] patient was unfortunately the victim of anoxic brain injury and persistent vegetative state who presented to the hospital with hypothermia.  She underwent evaluation for sepsis and started on broad-spectrum antimicrobial therapy.  She underwent further evaluation cause of, including CT scan of the abdomen as she had a recent cholecystostomy tube, as well as chest x-ray.  Likely etiology of her sepsis was secondary to pneumonia.  She improved with broad-spectrum antimicrobial therapy, and was discharged to long-term acute care to completed full course of 10 days of IV antibiotics.  Goals of care discussion was had with the patient's family, who opted for DNR, but continuation of the current treatment.       From ERP, Dr Nino hpi today:Genna Felix is a 61 y.o. female who presents to the ED via EMS from [place] with an onset of hypothermia.  Patient was atop of a thermal blanket in order to regulate her temperature, but the blanket malfunctioned throughout the night causing patient's temperature to drop.  She presented to the ED with a rectal temp of 89 °F.  EMS notes the patient has a "blood shot" left eye and a steady "upward gaze."  Patient presents with a drain to the RUQ, booties to bilateral feet, trach in place and Carroll catheter, per EMS.  They note patient is a "full code" " and responds with a jump to startling noises only.  No other symptoms reported at this time.  Patient has a Hx of respiratory distress, hemangioma of intra-abdominal structure, tracheostomy dependence, anoxic brain damage, PE, HTN, protein claorie malnutrition, PEG tube placement, bed bound, total self-care deficit, nursing home resident, and cardiac arrest.  PSHx includes PEG with tracheostomy placement.  HPI is limited secondary to nonverbal patient.          Progress Note  PULMONARY    Admit Date: 3/17/2022   03/19/2022      SUBJECTIVE:     3/18 not arousing, more animated  3/19 no arousing,        PFSH and Allergies reviewed.    OBJECTIVE:     Vitals (Most recent):  Vitals:    03/19/22 1300   BP: (!) 112/53   Pulse: (!) 119   Resp: (!) 28   Temp: 99.14 °F (37.3 °C)       Vitals (24 hour range):  Temp:  [96.26 °F (35.7 °C)-99.32 °F (37.4 °C)]   Pulse:  []   Resp:  [18-79]   BP: ()/(51-65)   SpO2:  [93 %-100 %]       Intake/Output Summary (Last 24 hours) at 3/19/2022 1441  Last data filed at 3/19/2022 0800  Gross per 24 hour   Intake 3540.6 ml   Output 1440 ml   Net 2100.6 ml          Physical Exam:  The patient's neuro status (alertness,orientation,cognitive function,motor skills,), pharyngeal exam (oral lesions, hygiene, abn dentition,), Neck (jvd,mass,thyroid,nodes in neck and above/below clavicle),RESPIRATORY(symmetry,effort,fremitus,percussion,auscultation),  Cor(rhythm,heart tones including gallops,perfusion,edema)ABD(distention,hepatic&splenomegaly,tenderness,masses), Skin(rash,cyanosis),Psyc(affect,judgement,).  Exam negative except for these pertinent findings:    Less upward gaze and moves spontaneously today (required stimuli 3/17), chest is symmetric, no distress, normal percussion, normal fremitus and good normal breath sounds  Diffuse puffy edema    Radiographs reviewed: view by direct vision    Results for orders placed during the hospital encounter of 01/03/22    X-Ray Chest 1  View    Narrative  EXAMINATION:  XR CHEST 1 VIEW    CLINICAL HISTORY:  aspiration;    TECHNIQUE:  Single frontal view of the chest was performed.    COMPARISON:  01/09/2022    FINDINGS:  A tracheostomy cannula is present with the heart size is normal.  There is patchy airspace disease in the right mid to lower lung zone without significant change.  This is superimposed on generalized prominence of interstitial markings.  There is no pleural effusion or pneumothorax.    Impression  Right lung infiltrate without significant change.      Electronically signed by: Salvador Smith MD  Date:    01/10/2022  Time:    15:25  ]    Labs     No results for input(s): WBC, HGB, HCT, PLT, BAND, METAMYELOCYT, MYELOPCT, HGBA1C in the last 24 hours.  No results for input(s): NA, K, CL, CO2, BUN, CREATININE, GLU, CALCIUM, CAION, MG, PHOS, AST, ALT, ALKPHOS, BILITOT, BILIDIR, PROT, ALBUMIN, PREALBUMIN, AMYLASE, LIPASE, CRP, HSCRP, SEDRATE, PROCAL, INR, PTT, LABHEPA, LACTATE, TROPONINI, CPK, CPKMB, MB, BNP in the last 24 hours.No results for input(s): PH, PCO2, PO2, HCO3 in the last 24 hours.  Microbiology Results (last 7 days)     Procedure Component Value Units Date/Time    Blood culture x two cultures. Draw prior to antibiotics. [[id number]] Collected: 03/17/22 0854    Order Status: Completed Specimen: Blood from Peripheral, Right Hand Updated: 03/19/22 1412     Blood Culture, Routine No Growth to date      No Growth to date      No Growth to date    Narrative:      Aerobic and anaerobic    Blood culture x two cultures. Draw prior to antibiotics. [[id number]] Collected: 03/17/22 0844    Order Status: Completed Specimen: Blood Updated: 03/19/22 0914     Blood Culture, Routine Gram stain aer bottle: Gram positive cocci in chains resembling Strep       Results called to and read back by: RASHEED LACEY RN  03/18/2022        08:06    Narrative:      Aerobic and anaerobic    Culture, Respiratory with Gram Stain [[id number]]  (Abnormal)  Collected: 03/17/22 1337    Order Status: Completed Specimen: Respiratory from Tracheal Aspirate Updated: 03/19/22 0827     Respiratory Culture GRAM NEGATIVE CHRISTINA  Many  Identification and susceptibility pending       Gram Stain (Respiratory) Rare WBC's     Gram Stain (Respiratory) Many Gram positive cocci     Gram Stain (Respiratory) Many Gram negative rods    Aerobic culture [[id number]] Collected: 03/18/22 1630    Order Status: Sent Specimen: Wound from Elbow, Right Updated: 03/19/22 0223    Urine Culture High Risk [[id number]] Collected: 03/18/22 1010    Order Status: Sent Specimen: Urine, Catheterized Updated: 03/18/22 1851          Impression:  Active Hospital Problems    Diagnosis  POA    *Pneumonia of right lower lobe due to infectious organism [J18.9]  Yes    Pressure injury of right elbow, stage 4 [L89.014]  Yes    Cirrhosis of liver [K74.60]  Yes    Acute on chronic respiratory failure with hypoxia [J96.21]  Yes    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             .        Plan: t min 89, p 58 to 107, merrem/vanc, midodrine, lovenox 40/d,  28% ox,wbc 3.7, procal 0.04, u/a pos for blood and wbc, cxr min rll opacification-- ct abd and cxr 1/26 with rll density from apparent atelectasis.     Urine culture positive for proteus 1/5/2022 s to merrem.  Not clear if had c diff prophylaxsis recent admits but  c diff Pos May 2021.    I do not see clear pneumonia with cxr pattern and procal.  Would monitor on merrem.       H/o prior c diff -- consider prophylaxsis for c diff?  Monitor.    3/18 tm 100.6, p 124,   I/o 1503/1550  merrem and vanc  lovenox       U/a abn,procal low and cxr stable from Jan-- would rec urine culture if not done    Pt called pneumonia but findings are minimal  -- await cultures, adjust abx, return to  [facility] when stable.      3/19 tm 100.04, p to 119, merrem and vanc, rm air,   gnr sputum    Will recheck later, need sensitivities to determine dc abx.

## 2022-03-19 NOTE — CARE UPDATE
03/18/22 1933   Patient Assessment/Suction   Level of Consciousness (AVPU) responds to pain   Respiratory Effort Unlabored   Expansion/Accessory Muscles/Retractions no use of accessory muscles   All Lung Fields Breath Sounds diminished   Rhythm/Pattern, Respiratory assisted mechanically   Cough Frequency with stimulation   Cough Type assisted   Suction Method oral;tracheal   $ Suction Charges Inline Suction Procedure Stat Charge   Secretions Amount moderate   Secretions Color white;yellow   Secretions Characteristics thick   PRE-TX-O2   O2 Device (Oxygen Therapy) ventilator   $ Is the patient on Low Flow Oxygen? Yes   Oxygen Concentration (%) 28   SpO2 100 %   Pulse Oximetry Type Continuous   $ Pulse Oximetry - Multiple Charge Pulse Oximetry - Multiple   Pulse 94   Resp (!) 22   Temp 98.6 °F (37 °C)   ETCO2   $ ETCO2 Usage Currently wearing   ETCO2 (mmHg) 27 mmHg   ETCO2 Device Type Bedside Monitor;Ventilator;Artificial Airway   Aerosol Therapy   $ Aerosol Therapy Charges Aerosol Treatment   Respiratory Treatment Status (SVN) given   Treatment Route (SVN) in-line;ventilator   Patient Position (SVN) HOB elevated   Post Treatment Assessment (SVN) breath sounds unchanged   Signs of Intolerance (SVN) none   Breath Sounds Post-Respiratory Treatment   Throughout All Fields Post-Treatment All Fields   Throughout All Fields Post-Treatment no change   Post-treatment Heart Rate (beats/min) 100   Post-treatment Resp Rate (breaths/min) 22   Skin Integrity   $ Wound Care Tech Time 15 min   Area Observed Neck under tracheostomy   Skin Appearance without discoloration        Surgical Airway Portex Cuffed   No placement date or time found.   Present Prior to Hospital Arrival?: Yes  Brand: Portex  Airway Device Size: 8.0  Style: Cuffed   Cuff Pressure 33 cm H2O   Cuff Inflation? Inflated   Speaking Valve Usage Not wearing   Status Secured   Site Assessment Clean;Dry   Airway Safety   Ambu bag with the patient? Yes, Adult Ambu    Is mask with the patient? Yes, Adult Mask   Vent Select   Conventional Vent Y   Charged w/in last 24h YES   Preset Conventional Ventilator Settings   Vent Type NKV-550   Ventilation Type VC   Vent Mode A/CMV-VC   Humidity HME   Set Rate 14 BPM   Vt Set 400 mL   PEEP/CPAP 5.3 cmH20   Waveform RAMP   I-Trigger Type  Flow   Trigger Sensitivity Flow/I-Trigger 1.5 L/min   Patient Ventilator Parameters   Resp Rate Total 24 br/min   Peak Airway Pressure 35.9 cmH2O   Exhaled Vt 407 mL   Total Ve 8.78 mL   I:E Ratio Measured 1:2.7   Tubing ID (mm) 8 mm   Tube Type Trach   Inspired Tidal Volume (VTI) 425 mL   Conventional Ventilator Alarms   Alarms On Y   Ve High Alarm 14 L/min   Ve Low Alarm 4.6 L/min   Vt High Alarm 13.4 mL   Vt Low Alarm 4.5 mL   Resp Rate High Alarm 36 br/min   Press High Alarm 50 cmH2O   Apnea Rate 14   Apnea Volume (mL) 400 mL   Ready to Wean/Extubation Screen   FIO2<=50 (chart decimal) 0.28   MV<16L (chart vol.) 8.78   PEEP <=8 (chart #) 5.3   Ready to Wean Parameters   F/VT Ratio<105 (RSBI) (!) 54.05

## 2022-03-19 NOTE — CARE UPDATE
03/19/22 0756   Patient Assessment/Suction   Level of Consciousness (AVPU) responds to pain   Respiratory Effort Normal;Unlabored   Expansion/Accessory Muscles/Retractions expansion symmetric;no retractions;no use of accessory muscles   All Lung Fields Breath Sounds diminished;clear   Rhythm/Pattern, Respiratory assisted mechanically   Cough Frequency with stimulation   Cough Type assisted   Suction Method oral;tracheal   $ Suction Charges Inline Suction Procedure Stat Charge   Secretions Amount moderate   Secretions Color yellow;pale   Secretions Characteristics thick   PRE-TX-O2   O2 Device (Oxygen Therapy) ventilator   $ Is the patient on Low Flow Oxygen? Yes   SpO2 100 %   Pulse Oximetry Type Continuous   $ Pulse Oximetry - Multiple Charge Pulse Oximetry - Multiple   Pulse 98   Resp (!) 29   ETCO2   $ ETCO2 Usage Currently wearing   ETCO2 Device Type Bedside Monitor;Ventilator;Artificial Airway   Aerosol Therapy   $ Aerosol Therapy Charges Aerosol Treatment   Daily Review of Necessity (SVN) completed   Respiratory Treatment Status (SVN) given   Treatment Route (SVN) in-line;ventilator   Patient Position (SVN) HOB elevated   Post Treatment Assessment (SVN) breath sounds unchanged   Signs of Intolerance (SVN) none   Breath Sounds Post-Respiratory Treatment   Throughout All Fields Post-Treatment All Fields   Throughout All Fields Post-Treatment no change   Post-treatment Heart Rate (beats/min) 100   Post-treatment Resp Rate (breaths/min) 26   Skin Integrity   $ Wound Care Tech Time 15 min   Area Observed Neck;Neck under tracheostomy   Skin Appearance without discoloration        Surgical Airway Portex Cuffed   No placement date or time found.   Present Prior to Hospital Arrival?: Yes  Brand: Portex  Airway Device Size: 8.0  Style: Cuffed   Cuff Pressure 43 cm H2O   Cuff Inflation? Inflated   Status Secured   Site Assessment Clean;Dry;Midline   Site Care Cleansed;Dried;Dressing applied   Inner Cannula Care  Cleansed/dried   Ties Assessment Intact;Secure   General Safety Checklist   Safety Promotion/Fall Prevention side rails raised   Airway Safety   Ambu bag with the patient? Yes, Adult Ambu   Is mask with the patient? Yes, Adult Mask   Suction set is at the bedside? Yes   Extra trach at bedside? No   Is obturator available? No   Equipment Change   $ RT Equipment HEPA filter;Inline suction catheter   Closed Suction System Changed? Yes   Closed Suction System Next Due 03/21/22   Respiratory Interventions   Airway/Ventilation Management pulmonary hygiene promoted;airway patency maintained   Airway/Ventilation Support pulmonary hygiene promoted   Airway Assistance   $ Trach Assist Charges Trach Assist;Tech time 15 min  (trach care done)   Vent Select   Conventional Vent Y   $ Ventilator Subsequent 1   Charged w/in last 24h YES   Preset Conventional Ventilator Settings   Vent Type NKV-550   Ventilation Type VC   Humidity HME   Patient Ventilator Parameters   Mean Airway Pressure 14.5 cmH20   Tubing ID (mm) 8 mm   Tube Type Trach   Conventional Ventilator Alarms   Alarms On Y   Ve High Alarm 14 L/min   Ve Low Alarm 5 L/min   Vt High Alarm 13 mL   Vt Low Alarm 5 mL   Resp Rate High Alarm 40 br/min   Press High Alarm 50 cmH2O   Delay Alarm (Sec) 30 sec(s)   IHI Ventilator Associated Pneumonia Bundle (Required)   Head of Bed Elevated  HOB 30   Oral Care Lip moisturizer applied;Mouth swabbed;Mouth moisturizer;Mouth suctioned;with half strength hydrogen peroxide

## 2022-03-20 PROBLEM — K94.23 LEAKING PEG TUBE: Status: ACTIVE | Noted: 2022-03-20

## 2022-03-20 LAB
BACTERIA #/AREA URNS HPF: ABNORMAL /HPF
BACTERIA BLD CULT: ABNORMAL
BILIRUB UR QL STRIP: NEGATIVE
CLARITY UR: CLEAR
COLOR UR: YELLOW
GLUCOSE UR QL STRIP: NEGATIVE
HGB UR QL STRIP: ABNORMAL
HYALINE CASTS #/AREA URNS LPF: 0 /LPF
KETONES UR QL STRIP: NEGATIVE
LEUKOCYTE ESTERASE UR QL STRIP: NEGATIVE
MICROSCOPIC COMMENT: ABNORMAL
NITRITE UR QL STRIP: NEGATIVE
PH UR STRIP: 6 [PH] (ref 5–8)
POCT GLUCOSE: 129 MG/DL (ref 70–110)
POCT GLUCOSE: 87 MG/DL (ref 70–110)
POCT GLUCOSE: 90 MG/DL (ref 70–110)
POCT GLUCOSE: 98 MG/DL (ref 70–110)
POCT GLUCOSE: 99 MG/DL (ref 70–110)
PROT UR QL STRIP: ABNORMAL
RBC #/AREA URNS HPF: 81 /HPF (ref 0–4)
SP GR UR STRIP: 1.02 (ref 1–1.03)
SQUAMOUS #/AREA URNS HPF: 5 /HPF
URN SPEC COLLECT METH UR: ABNORMAL
UROBILINOGEN UR STRIP-ACNC: NEGATIVE EU/DL
WBC #/AREA URNS HPF: 13 /HPF (ref 0–5)

## 2022-03-20 PROCEDURE — 27000221 HC OXYGEN, UP TO 24 HOURS

## 2022-03-20 PROCEDURE — A4216 STERILE WATER/SALINE, 10 ML: HCPCS | Performed by: INTERNAL MEDICINE

## 2022-03-20 PROCEDURE — 63600175 PHARM REV CODE 636 W HCPCS: Performed by: INTERNAL MEDICINE

## 2022-03-20 PROCEDURE — 20000000 HC ICU ROOM

## 2022-03-20 PROCEDURE — 94003 VENT MGMT INPAT SUBQ DAY: CPT

## 2022-03-20 PROCEDURE — 27000207 HC ISOLATION

## 2022-03-20 PROCEDURE — 25000242 PHARM REV CODE 250 ALT 637 W/ HCPCS: Performed by: INTERNAL MEDICINE

## 2022-03-20 PROCEDURE — 63600175 PHARM REV CODE 636 W HCPCS: Performed by: HOSPITALIST

## 2022-03-20 PROCEDURE — 25000003 PHARM REV CODE 250: Performed by: HOSPITALIST

## 2022-03-20 PROCEDURE — 87086 URINE CULTURE/COLONY COUNT: CPT | Performed by: HOSPITALIST

## 2022-03-20 PROCEDURE — 94761 N-INVAS EAR/PLS OXIMETRY MLT: CPT

## 2022-03-20 PROCEDURE — 94640 AIRWAY INHALATION TREATMENT: CPT

## 2022-03-20 PROCEDURE — 81000 URINALYSIS NONAUTO W/SCOPE: CPT | Performed by: HOSPITALIST

## 2022-03-20 PROCEDURE — 25000003 PHARM REV CODE 250: Performed by: INTERNAL MEDICINE

## 2022-03-20 PROCEDURE — 99900026 HC AIRWAY MAINTENANCE (STAT)

## 2022-03-20 PROCEDURE — 99900035 HC TECH TIME PER 15 MIN (STAT)

## 2022-03-20 RX ADMIN — Medication 125 MG: at 08:03

## 2022-03-20 RX ADMIN — MEROPENEM 2 G: 1 INJECTION, POWDER, FOR SOLUTION INTRAVENOUS at 08:03

## 2022-03-20 RX ADMIN — MORPHINE SULFATE 3 MG: 4 INJECTION INTRAVENOUS at 10:03

## 2022-03-20 RX ADMIN — MIDODRINE HYDROCHLORIDE 10 MG: 5 TABLET ORAL at 08:03

## 2022-03-20 RX ADMIN — IPRATROPIUM BROMIDE AND ALBUTEROL SULFATE 3 ML: 2.5; .5 SOLUTION RESPIRATORY (INHALATION) at 06:03

## 2022-03-20 RX ADMIN — ACETYLCYSTEINE 4 ML: 100 INHALANT RESPIRATORY (INHALATION) at 07:03

## 2022-03-20 RX ADMIN — IPRATROPIUM BROMIDE AND ALBUTEROL SULFATE 3 ML: 2.5; .5 SOLUTION RESPIRATORY (INHALATION) at 12:03

## 2022-03-20 RX ADMIN — Medication 125 MG: at 09:03

## 2022-03-20 RX ADMIN — MEROPENEM 2 G: 1 INJECTION, POWDER, FOR SOLUTION INTRAVENOUS at 04:03

## 2022-03-20 RX ADMIN — SODIUM CHLORIDE, PRESERVATIVE FREE 10 ML: 5 INJECTION INTRAVENOUS at 06:03

## 2022-03-20 RX ADMIN — SODIUM CHLORIDE, PRESERVATIVE FREE 10 ML: 5 INJECTION INTRAVENOUS at 12:03

## 2022-03-20 RX ADMIN — IPRATROPIUM BROMIDE AND ALBUTEROL SULFATE 3 ML: 2.5; .5 SOLUTION RESPIRATORY (INHALATION) at 01:03

## 2022-03-20 RX ADMIN — MEROPENEM 2 G: 1 INJECTION, POWDER, FOR SOLUTION INTRAVENOUS at 12:03

## 2022-03-20 RX ADMIN — MUPIROCIN: 20 OINTMENT TOPICAL at 09:03

## 2022-03-20 RX ADMIN — ACETYLCYSTEINE 4 ML: 100 INHALANT RESPIRATORY (INHALATION) at 12:03

## 2022-03-20 RX ADMIN — ACETYLCYSTEINE 4 ML: 100 INHALANT RESPIRATORY (INHALATION) at 06:03

## 2022-03-20 RX ADMIN — POLYETHYLENE GLYCOL (3350) 17 G: 17 POWDER, FOR SOLUTION ORAL at 07:03

## 2022-03-20 RX ADMIN — IPRATROPIUM BROMIDE AND ALBUTEROL SULFATE 3 ML: 2.5; .5 SOLUTION RESPIRATORY (INHALATION) at 07:03

## 2022-03-20 RX ADMIN — ENOXAPARIN SODIUM 40 MG: 40 INJECTION SUBCUTANEOUS at 04:03

## 2022-03-20 RX ADMIN — MUPIROCIN: 20 OINTMENT TOPICAL at 08:03

## 2022-03-20 RX ADMIN — MIDODRINE HYDROCHLORIDE 10 MG: 5 TABLET ORAL at 09:03

## 2022-03-20 RX ADMIN — DEXTROSE AND SODIUM CHLORIDE: 5; .9 INJECTION, SOLUTION INTRAVENOUS at 11:03

## 2022-03-20 RX ADMIN — COLLAGENASE SANTYL: 250 OINTMENT TOPICAL at 09:03

## 2022-03-20 RX ADMIN — ACETYLCYSTEINE 4 ML: 100 INHALANT RESPIRATORY (INHALATION) at 01:03

## 2022-03-20 RX ADMIN — SCOPALAMINE 1 PATCH: 1 PATCH, EXTENDED RELEASE TRANSDERMAL at 11:03

## 2022-03-20 RX ADMIN — SODIUM CHLORIDE, PRESERVATIVE FREE 10 ML: 5 INJECTION INTRAVENOUS at 11:03

## 2022-03-20 RX ADMIN — MIDODRINE HYDROCHLORIDE 10 MG: 5 TABLET ORAL at 03:03

## 2022-03-20 RX ADMIN — VANCOMYCIN HYDROCHLORIDE 1250 MG: 1.25 INJECTION, POWDER, LYOPHILIZED, FOR SOLUTION INTRAVENOUS at 04:03

## 2022-03-20 NOTE — PLAN OF CARE
Pt unable to participate in care plan review. Pt at baseline - nonverbal, trach, vent. G-tube in place - TF on hold at this time due to lower left abdominal fullness/firmness on assessment and audible gas coming from around PEG site when abdomen palpated - Dr. Goss notified of issue and that TF have been held. Wound care done this morning on elbow - wound cleaned and dressing changed. Beth hugger still utilized to maintain temps. VSS otherwise.    Problem: Adult Inpatient Plan of Care  Goal: Plan of Care Review  Outcome: Ongoing, Progressing     Problem: Aspiration (Enteral Nutrition)  Goal: Absence of Aspiration Signs and Symptoms  Outcome: Ongoing, Progressing     Problem: Feeding Intolerance (Enteral Nutrition)  Goal: Feeding Tolerance  Outcome: Ongoing, Progressing     Problem: Skin Injury Risk Increased  Goal: Skin Health and Integrity  Outcome: Ongoing, Progressing     Problem: Glycemic Control Impaired (Sepsis/Septic Shock)  Goal: Blood Glucose Level Within Desired Range  Outcome: Ongoing, Progressing     Problem: Nutrition Impaired (Sepsis/Septic Shock)  Goal: Optimal Nutrition Intake  Outcome: Ongoing, Progressing

## 2022-03-20 NOTE — PROGRESS NOTES
[facility] Hospital Medicine  Progress Note    Patient Name: Genna Felix  MRN: [id number]  Patient Class: IP- Inpatient   Admission Date: 3/17/2022  Length of Stay: 2 days  Attending Physician: Lanre Goss MD  Primary Care Provider: Primary Doctor No        Subjective:     Principal Problem:Pneumonia of right lower lobe due to infectious organism        HPI:  No notes on file    Overview/Hospital Course:  No notes on file    Interval History:   No major changes in status.  Tolerating tube feed rate.    Review of Systems   Unable to perform ROS: Patient unresponsive   Objective:     Vital Signs (Most Recent):  Temp: 98.06 °F (36.7 °C) (03/19/22 1931)  Pulse: 99 (03/19/22 1931)  Resp: (!) 24 (03/19/22 1931)  BP: (!) 117/56 (03/19/22 1931)  SpO2: 100 % (03/19/22 1931)   Vital Signs (24h Range):  Temp:  [96.8 °F (36 °C)-99.32 °F (37.4 °C)] 98.06 °F (36.7 °C)  Pulse:  [] 99  Resp:  [20-76] 24  SpO2:  [83 %-100 %] 100 %  BP: ()/(51-66) 117/56     Weight: 70.4 kg (155 lb 3.3 oz)  Body mass index is 30.31 kg/m².    Intake/Output Summary (Last 24 hours) at 3/19/2022 2049  Last data filed at 3/19/2022 1830  Gross per 24 hour   Intake 3476.38 ml   Output 1940 ml   Net 1536.38 ml        Physical Exam  Constitutional:       General: She is not in acute distress.  Eyes:      General:         Right eye: No discharge.         Left eye: No discharge.   Neck:      Vascular: No JVD.   Cardiovascular:      Rate and Rhythm: Normal rate and regular rhythm.   Pulmonary:      Breath sounds: Normal breath sounds.      Comments: Tracheostomy connected to ventilator  Abdominal:      General: Abdomen is flat. Bowel sounds are normal. There is no distension.      Palpations: Abdomen is soft.   Musculoskeletal:      Right lower leg: No edema.      Left lower leg: No edema.   Skin:     General: Skin is warm and moist.      Findings: No rash.   Neurological:      Mental Status: She is alert.      Comments: Not  "responding to questions.  Not following commands.  She has an upward gaze.       Significant Labs: All pertinent labs within the past 24 hours have been reviewed.    Significant Imaging: I have reviewed all pertinent imaging results/findings within the past 24 hours.      Assessment/Plan:      * Pneumonia of right lower lobe due to infectious organism  Was present on day 1 of admission.  Continue antibiotics.  Monitor for improvement.    Antibiotics (From admission, onward)            Start     Stop Route Frequency Ordered    03/18/22 1000  vancomycin in dextrose 5 % 1 gram/250 mL IVPB 1,000 mg         -- IV Every 18 hours 03/18/22 0842    03/17/22 2300  mupirocin 2 % ointment         03/22 2059 Nasl 2 times daily 03/17/22 2151 03/17/22 2100  vancomycin 125 mg/5 mL oral solution 125 mg         -- PER G TUBE 2 times daily 03/17/22 2008 03/17/22 1900  meropenem (MERREM) 2 g in sodium chloride 0.9% 100 mL IVPB         -- IV Every 8 hours (non-standard times) 03/17/22 1007    03/17/22 1105  vancomycin - pharmacy to dose  (vancomycin IVPB)        "And" Linked Group Details    -- IV pharmacy to manage frequency 03/17/22 1007              Pressure injury of right elbow, stage 4  Was present on hospital day 1.  Wound care nurse evaluated it and recommended methods of treatment.      Cirrhosis of liver  Chronic.  Liver functions appear stable.    Lab Results   Component Value Date    ALT 71 (H) 03/18/2022    AST 34 03/18/2022    ALKPHOS 188 (H) 03/18/2022    BILITOT 0.9 03/18/2022           Acute on chronic respiratory failure with hypoxia  Continue mechanical ventilator support.  Monitor oximetry readings.  Consulting with pulmonologist to help with management.    Tracheostomy dependence  Chronic.  Continue routine care.      Sepsis  Pt has sepsis due to pneumonia.  No signs of organ damage.  She's on IVAB to get source control.      Persistent vegetative state  Chronic.      Anemia of chronic disease  Patient's " anemia is currently controlled. Has not received any PRBCs to date.. Etiology likely d/t chronic disease  Current CBC reviewed-   Lab Results   Component Value Date    HGB 8.2 (L) 03/18/2022    HCT 27.4 (L) 03/18/2022     Monitor serial CBC and transfuse if patient becomes hemodynamically unstable, symptomatic or H/H drops below 7/21.           VTE Risk Mitigation (From admission, onward)         Ordered     enoxaparin injection 40 mg  Daily         03/17/22 1013     IP VTE HIGH RISK PATIENT  Once         03/17/22 1013     Place sequential compression device  Until discontinued         03/17/22 1013                Discharge Planning   [place]: 3/21/2022     Code Status: Full Code   Is the patient medically ready for discharge?:     Reason for patient still in hospital (select all that apply): Patient trending condition and Treatment  Discharge Plan A: Return to nursing home          Lanre Goss MD  Department of Hospital Medicine   [facility]

## 2022-03-20 NOTE — PLAN OF CARE
Assumed care of this patient at 00:30.  Previous nurse stated that when bathing and turning this patient, she had a lot of drainage come from around PEG tube.  She placed the tube feedings on hold and placed a dressing around PEG tube.  At current time (04:49), dressing was removed from PEG site.  Gentle pressing of abdoment did not produce any drainage from PEG tube site.  Residual check revealed 0 ml; therefore, tube feedings resumed.  Started at 20 ml and will monitor.  New feeding bag and tubing started.      Problem: Aspiration (Enteral Nutrition)  Goal: Absence of Aspiration Signs and Symptoms  Outcome: Ongoing, Progressing     Problem: Device-Related Complication Risk (Enteral Nutrition)  Goal: Safe, Effective Therapy Delivery  Outcome: Ongoing, Progressing     Problem: Feeding Intolerance (Enteral Nutrition)  Goal: Feeding Tolerance  Outcome: Ongoing, Progressing     Problem: Nutrition Impaired (Sepsis/Septic Shock)  Goal: Optimal Nutrition Intake  Outcome: Ongoing, Progressing

## 2022-03-20 NOTE — CARE UPDATE
03/19/22 1931   Patient Assessment/Suction   Level of Consciousness (AVPU) responds to voice   Respiratory Effort Normal;Unlabored   Expansion/Accessory Muscles/Retractions expansion symmetric;no use of accessory muscles   All Lung Fields Breath Sounds diminished   Rhythm/Pattern, Respiratory assisted mechanically   Cough Frequency with stimulation   Cough Type assisted   Suction Method oral;tracheal   $ Suction Charges Inline Suction Procedure Stat Charge   Secretions Amount small   Secretions Color pale;yellow   Secretions Characteristics thick   PRE-TX-O2   O2 Device (Oxygen Therapy) ventilator   $ Is the patient on Low Flow Oxygen? Yes   Oxygen Concentration (%) 21   SpO2 100 %   Pulse Oximetry Type Continuous   $ Pulse Oximetry - Multiple Charge Pulse Oximetry - Multiple   Pulse 99   Resp (!) 24   Temp 98.06 °F (36.7 °C)   BP (!) 117/56   ETCO2   $ ETCO2 Usage Currently wearing   ETCO2 (mmHg) 32 mmHg   ETCO2 Device Type Bedside Monitor;Ventilator;Artificial Airway   Aerosol Therapy   $ Aerosol Therapy Charges Aerosol Treatment   Respiratory Treatment Status (SVN) given   Treatment Route (SVN) in-line;ventilator   Patient Position (SVN) HOB elevated   Post Treatment Assessment (SVN) breath sounds unchanged   Signs of Intolerance (SVN) none   Breath Sounds Post-Respiratory Treatment   Throughout All Fields Post-Treatment All Fields   Throughout All Fields Post-Treatment no change   Post-treatment Heart Rate (beats/min) 99   Post-treatment Resp Rate (breaths/min) 27   Skin Integrity   $ Wound Care Tech Time 15 min   Area Observed Neck under tracheostomy   Skin Appearance without discoloration   Barrier used? Other (see comments)  (drain sponge)        Surgical Airway Portex Cuffed   No placement date or time found.   Present Prior to Hospital Arrival?: Yes  Brand: Portex  Airway Device Size: 8.0  Style: Cuffed   Cuff Pressure 36 cm H2O   Cuff Inflation? Inflated   Speaking Valve Usage Not wearing   Status  Secured   Site Assessment Clean;Dry   Airway Safety   Ambu bag with the patient? Yes, Adult Ambu   Is mask with the patient? Yes, Adult Mask   Vent Select   Conventional Vent Y   Charged w/in last 24h YES   Preset Conventional Ventilator Settings   Vent Type NKV-550   Ventilation Type VC   Vent Mode A/CMV-VC   Humidity HME   Set Rate 14 BPM   Vt Set 400 mL   PEEP/CPAP 5.7 cmH20   Waveform RAMP   I-Trigger Type  Flow   Trigger Sensitivity Flow/I-Trigger 1.5 L/min   Patient Ventilator Parameters   Resp Rate Total 25 br/min   Peak Airway Pressure 24.9 cmH2O   Mean Airway Pressure 22.7 cmH20   Plateau Pressure 34.7 cmH20   Exhaled Vt 399 mL   Total Ve 9.63 mL   I:E Ratio Measured 1:1.5   Total PEEP 4.8 cmH20   Tubing ID (mm) 8 mm   Tube Type Trach   Inspired Tidal Volume (VTI) 420 mL   Conventional Ventilator Alarms   Alarms On Y   Ve High Alarm 14 L/min   Ve Low Alarm 5 L/min   Vt High Alarm 13 mL   Vt Low Alarm 5 mL   Resp Rate High Alarm 40 br/min   Press High Alarm 50 cmH2O   Apnea Rate 14   Apnea Volume (mL) 400 mL   Ready to Wean/Extubation Screen   FIO2<=50 (chart decimal) 0.21   MV<16L (chart vol.) 9.63   PEEP <=8 (chart #) 5.7   Ready to Wean Parameters   F/VT Ratio<105 (RSBI) (!) 60.15

## 2022-03-20 NOTE — RESPIRATORY THERAPY
Patient remains on NKV-550 on AC rate 14, , Peep +5 and FI02 .21.  Hr 90, ETC02 28mmhg and 02 02 saturation 98%.  Patient trached via 8 portex with 49pqu15 cuff psi.  Patient receiving aerosol tx via duoneb and 4ml 10% mucomyst now and q6hr.  Ambu bag  At bedside with alarms on and functioining properly.

## 2022-03-20 NOTE — SUBJECTIVE & OBJECTIVE
Interval History:   No major changes in status.  Tolerating tube feed rate.    Review of Systems   Unable to perform ROS: Patient unresponsive   Objective:     Vital Signs (Most Recent):  Temp: 98.06 °F (36.7 °C) (03/19/22 1931)  Pulse: 99 (03/19/22 1931)  Resp: (!) 24 (03/19/22 1931)  BP: (!) 117/56 (03/19/22 1931)  SpO2: 100 % (03/19/22 1931)   Vital Signs (24h Range):  Temp:  [96.8 °F (36 °C)-99.32 °F (37.4 °C)] 98.06 °F (36.7 °C)  Pulse:  [] 99  Resp:  [20-76] 24  SpO2:  [83 %-100 %] 100 %  BP: ()/(51-66) 117/56     Weight: 70.4 kg (155 lb 3.3 oz)  Body mass index is 30.31 kg/m².    Intake/Output Summary (Last 24 hours) at 3/19/2022 2049  Last data filed at 3/19/2022 1830  Gross per 24 hour   Intake 3476.38 ml   Output 1940 ml   Net 1536.38 ml        Physical Exam  Constitutional:       General: She is not in acute distress.  Eyes:      General:         Right eye: No discharge.         Left eye: No discharge.   Neck:      Vascular: No JVD.   Cardiovascular:      Rate and Rhythm: Normal rate and regular rhythm.   Pulmonary:      Breath sounds: Normal breath sounds.      Comments: Tracheostomy connected to ventilator  Abdominal:      General: Abdomen is flat. Bowel sounds are normal. There is no distension.      Palpations: Abdomen is soft.   Musculoskeletal:      Right lower leg: No edema.      Left lower leg: No edema.   Skin:     General: Skin is warm and moist.      Findings: No rash.   Neurological:      Mental Status: She is alert.      Comments: Not responding to questions.  Not following commands.  She has an upward gaze.       Significant Labs: All pertinent labs within the past 24 hours have been reviewed.    Significant Imaging: I have reviewed all pertinent imaging results/findings within the past 24 hours.

## 2022-03-20 NOTE — PROGRESS NOTES
Pharmacokinetic Assessment Follow Up: IV Vancomycin    Vancomycin serum concentration assessment(s):    The trough level was drawn correctly and can be used to guide therapy at this time. The measurement is below the desired definitive target range of 15 to 20 mcg/mL.    Vancomycin Regimen Plan:    Change regimen to Vancomycin 1250 mg IV every 18 hours with next serum trough concentration measured at 0930 prior to 3rd dose on 03/21/22    Drug levels (last 3 results):  Recent Labs   Lab Result Units 03/18/22  0624 03/19/22  2136   Vancomycin, Random ug/mL 8.0  --    Vancomycin-Trough ug/mL  --  10.9       Pharmacy will continue to follow and monitor vancomycin.    Please contact pharmacy at extension [phone] for questions regarding this assessment.    Thank you for the consult,   Andrei Spencer       Patient brief summary:  Genna Felix is a 61 y.o. female initiated on antimicrobial therapy with IV Vancomycin for treatment of lower respiratory infection    The patient's current regimen is 1000mg q18h    Drug Allergies:   Review of patient's allergies indicates:  No Known Allergies    Actual Body Weight:   70.4kg    Renal Function:   Estimated Creatinine Clearance: 106.1 mL/min (based on SCr of 0.5 mg/dL).,     CBC (last 72 hours):  Recent Labs   Lab Result Units 03/17/22  0843 03/18/22  0624   WBC K/uL 3.72* 7.06   Hemoglobin g/dL 11.3* 8.2*   Hematocrit % 37.3 27.4*   Platelets K/uL 264 260   Gran % % 72.3 85.3*   Lymph % % 16.9* 7.6*   Mono % % 6.2 5.7   Eosinophil % % 4.0 0.8   Basophil % % 0.3 0.3   Differential Method  Automated Automated       Metabolic Panel (last 72 hours):  Recent Labs   Lab Result Units 03/17/22  0843 03/17/22  1031 03/18/22  0624   Sodium mmol/L 135*  --  137   Potassium mmol/L 4.6  --  3.9   Chloride mmol/L 103  --  108   CO2 mmol/L 22*  --  21*   Glucose mg/dL 82  --  82   Glucose, UA   --  Negative  --    BUN mg/dL 25*  --  16   Creatinine mg/dL 0.5  --  0.5   Albumin g/dL 3.1*  --  2.4*    Total Bilirubin mg/dL 1.0  --  0.9   Alkaline Phosphatase U/L 249*  --  188*   AST U/L 54*  --  34   ALT U/L 98*  --  71*   Magnesium mg/dL 1.8  --  1.6   Phosphorus mg/dL  --   --  4.2       Vancomycin Administrations:  vancomycin given in the last 96 hours                     vancomycin 125 mg/5 mL oral solution 125 mg (mg) 125 mg Given 03/19/22 2134     125 mg Given  0921     125 mg Given 03/18/22 2054     125 mg Given  0905     125 mg Given 03/17/22 2214    vancomycin in dextrose 5 % 1 gram/250 mL IVPB 1,000 mg (mg) 1,000 mg New Bag 03/19/22 0348     1,000 mg New Bag 03/18/22 1032    vancomycin 1.25 g in dextrose 5% 250 mL IVPB (ready to mix) (mg) 1,250 mg New Bag 03/17/22 1100                    Microbiologic Results:  Microbiology Results (last 7 days)       Procedure Component Value Units Date/Time    Urine Culture High Risk [[id number]]  (Abnormal) Collected: 03/18/22 1010    Order Status: Completed Specimen: Urine, Catheterized Updated: 03/19/22 2200     Urine Culture, Routine GRAM NEGATIVE CHRISTINA  10,000 - 49,999 cfu/ml  Identification and susceptibility pending      Narrative:      Indicated criteria for high risk culture:->Other  Other (specify):->sepsis    Blood culture x two cultures. Draw prior to antibiotics. [[id number]] Collected: 03/17/22 0854    Order Status: Completed Specimen: Blood from Peripheral, Right Hand Updated: 03/19/22 1412     Blood Culture, Routine No Growth to date      No Growth to date      No Growth to date    Narrative:      Aerobic and anaerobic    Blood culture x two cultures. Draw prior to antibiotics. [[id number]] Collected: 03/17/22 0844    Order Status: Completed Specimen: Blood Updated: 03/19/22 0914     Blood Culture, Routine Gram stain aer bottle: Gram positive cocci in chains resembling Strep       Results called to and read back by: RASHEED LACEY RN  03/18/2022        08:06    Narrative:      Aerobic and anaerobic    Culture, Respiratory with Gram Stain [[id number]]   (Abnormal) Collected: 03/17/22 1337    Order Status: Completed Specimen: Respiratory from Tracheal Aspirate Updated: 03/19/22 0858     Respiratory Culture GRAM NEGATIVE CHRISTINA  Many  Identification and susceptibility pending       Gram Stain (Respiratory) Rare WBC's     Gram Stain (Respiratory) Many Gram positive cocci     Gram Stain (Respiratory) Many Gram negative rods    Aerobic culture [[id number]] Collected: 03/18/22 1630    Order Status: Sent Specimen: Wound from Elbow, Right Updated: 03/19/22 0228

## 2022-03-20 NOTE — PLAN OF CARE
Continues with temp regulation difficulty.  Warming blanket in use. Current temp 96.4 core rectal.    Tolerating IV antibiotic.  Hamilton cath changed out and U/A sent to lab.  B/p and hr stable.  Tube feeding back at goal rate.  Did not see any significant drainage from PEG site.    Problem: Adult Inpatient Plan of Care  Goal: Plan of Care Review  Outcome: Ongoing, Progressing  Goal: Patient-Specific Goal (Individualized)  Outcome: Ongoing, Progressing  Goal: Absence of Hospital-Acquired Illness or Injury  Outcome: Ongoing, Progressing  Goal: Optimal Comfort and Wellbeing  Outcome: Ongoing, Progressing  Goal: Readiness for Transition of Care  Outcome: Ongoing, Progressing     Problem: Aspiration (Enteral Nutrition)  Goal: Absence of Aspiration Signs and Symptoms  Outcome: Ongoing, Progressing     Problem: Device-Related Complication Risk (Enteral Nutrition)  Goal: Safe, Effective Therapy Delivery  Outcome: Ongoing, Progressing     Problem: Aspiration (Enteral Nutrition)  Goal: Absence of Aspiration Signs and Symptoms  Outcome: Ongoing, Progressing     Problem: Device-Related Complication Risk (Enteral Nutrition)  Goal: Safe, Effective Therapy Delivery  Outcome: Ongoing, Progressing     Problem: Feeding Intolerance (Enteral Nutrition)  Goal: Feeding Tolerance  Outcome: Ongoing, Progressing     Problem: Skin Injury Risk Increased  Goal: Skin Health and Integrity  Outcome: Ongoing, Progressing     Problem: Bleeding (Sepsis/Septic Shock)  Goal: Absence of Bleeding  Outcome: Ongoing, Progressing     Problem: Glycemic Control Impaired (Sepsis/Septic Shock)  Goal: Blood Glucose Level Within Desired Range  Outcome: Ongoing, Progressing     Problem: Infection Progression (Sepsis/Septic Shock)  Goal: Absence of Infection Signs and Symptoms  Outcome: Ongoing, Progressing     Problem: Nutrition Impaired (Sepsis/Septic Shock)  Goal: Optimal Nutrition Intake  Outcome: Ongoing, Progressing     Problem: Communication Impairment  (Mechanical Ventilation, Invasive)  Goal: Effective Communication  Outcome: Ongoing, Progressing     Problem: Device-Related Complication Risk (Mechanical Ventilation, Invasive)  Goal: Optimal Device Function  Outcome: Ongoing, Progressing     Problem: Inability to Wean (Mechanical Ventilation, Invasive)  Goal: Mechanical Ventilation Liberation  Outcome: Unable to Meet, Plan Revised     Problem: Nutrition Impairment (Mechanical Ventilation, Invasive)  Goal: Optimal Nutrition Delivery  Outcome: Ongoing, Progressing     Problem: Skin and Tissue Injury (Mechanical Ventilation, Invasive)  Goal: Absence of Device-Related Skin and Tissue Injury  Outcome: Ongoing, Progressing     Problem: Ventilator-Induced Lung Injury (Mechanical Ventilation, Invasive)  Goal: Absence of Ventilator-Induced Lung Injury  Outcome: Ongoing, Progressing     Problem: Fall Injury Risk  Goal: Absence of Fall and Fall-Related Injury  Outcome: Ongoing, Progressing     Problem: Infection  Goal: Absence of Infection Signs and Symptoms  Outcome: Ongoing, Progressing

## 2022-03-21 ENCOUNTER — OUTSIDE PLACE OF SERVICE (OUTPATIENT)
Dept: INFECTIOUS DISEASES | Facility: CLINIC | Age: 62
End: 2022-03-21
Payer: MEDICARE

## 2022-03-21 DIAGNOSIS — N39.0 PSEUDOMONAS URINARY TRACT INFECTION: ICD-10-CM

## 2022-03-21 DIAGNOSIS — B95.2 BACTEREMIA DUE TO ENTEROCOCCUS: ICD-10-CM

## 2022-03-21 DIAGNOSIS — B96.5 PSEUDOMONAS URINARY TRACT INFECTION: ICD-10-CM

## 2022-03-21 DIAGNOSIS — J95.851 VAP (VENTILATOR-ASSOCIATED PNEUMONIA): ICD-10-CM

## 2022-03-21 DIAGNOSIS — R78.81 BACTEREMIA DUE TO ENTEROCOCCUS: ICD-10-CM

## 2022-03-21 LAB
ALBUMIN SERPL BCP-MCNC: 2.4 G/DL (ref 3.5–5.2)
ALP SERPL-CCNC: 203 U/L (ref 55–135)
ALT SERPL W/O P-5'-P-CCNC: 89 U/L (ref 10–44)
ANION GAP SERPL CALC-SCNC: 10 MMOL/L (ref 8–16)
AORTIC ROOT ANNULUS: 2.17 CM
AORTIC VALVE CUSP SEPERATION: 1.79 CM
ASCENDING AORTA: 2.45 CM
AST SERPL-CCNC: 57 U/L (ref 10–40)
AV INDEX (PROSTH): 0.6
AV MEAN GRADIENT: 5 MMHG
AV PEAK GRADIENT: 10 MMHG
AV VALVE AREA: 2.08 CM2
AV VELOCITY RATIO: 0.63
BACTERIA SPEC AEROBE CULT: ABNORMAL
BACTERIA UR CULT: NO GROWTH
BASOPHILS # BLD AUTO: 0.01 K/UL (ref 0–0.2)
BASOPHILS NFR BLD: 0.2 % (ref 0–1.9)
BILIRUB SERPL-MCNC: 0.8 MG/DL (ref 0.1–1)
BSA FOR ECHO PROCEDURE: 1.72 M2
BUN SERPL-MCNC: 5 MG/DL (ref 6–20)
CALCIUM SERPL-MCNC: 9.2 MG/DL (ref 8.7–10.5)
CHLORIDE SERPL-SCNC: 103 MMOL/L (ref 95–110)
CO2 SERPL-SCNC: 25 MMOL/L (ref 23–29)
CREAT SERPL-MCNC: 0.4 MG/DL (ref 0.5–1.4)
CV ECHO LV RWT: 0.27 CM
DIFFERENTIAL METHOD: ABNORMAL
DOP CALC AO PEAK VEL: 1.6 M/S
DOP CALC AO VTI: 31.04 CM
DOP CALC LVOT AREA: 3.5 CM2
DOP CALC LVOT DIAMETER: 2.11 CM
DOP CALC LVOT PEAK VEL: 1.01 M/S
DOP CALC LVOT STROKE VOLUME: 64.55 CM3
DOP CALC MV VTI: 21.68 CM
DOP CALCLVOT PEAK VEL VTI: 18.47 CM
E WAVE DECELERATION TIME: 155.39 MSEC
E/A RATIO: 1.09
E/E' RATIO: 9.88 M/S
ECHO LV POSTERIOR WALL: 0.59 CM (ref 0.6–1.1)
EJECTION FRACTION: 65 %
EOSINOPHIL # BLD AUTO: 0.2 K/UL (ref 0–0.5)
EOSINOPHIL NFR BLD: 5.5 % (ref 0–8)
ERYTHROCYTE [DISTWIDTH] IN BLOOD BY AUTOMATED COUNT: 16.9 % (ref 11.5–14.5)
EST. GFR  (AFRICAN AMERICAN): >60 ML/MIN/1.73 M^2
EST. GFR  (NON AFRICAN AMERICAN): >60 ML/MIN/1.73 M^2
FRACTIONAL SHORTENING: 32 % (ref 28–44)
GLUCOSE SERPL-MCNC: 87 MG/DL (ref 70–110)
GRAM STN SPEC: ABNORMAL
HCT VFR BLD AUTO: 26.5 % (ref 37–48.5)
HGB BLD-MCNC: 8 G/DL (ref 12–16)
IMM GRANULOCYTES # BLD AUTO: 0.02 K/UL (ref 0–0.04)
IMM GRANULOCYTES NFR BLD AUTO: 0.5 % (ref 0–0.5)
INTERVENTRICULAR SEPTUM: 0.64 CM (ref 0.6–1.1)
IVRT: 110.37 MSEC
LA MAJOR: 4.5 CM
LA MINOR: 5.42 CM
LA WIDTH: 3.49 CM
LEFT ATRIUM SIZE: 3.28 CM
LEFT ATRIUM VOLUME INDEX MOD: 28 ML/M2
LEFT ATRIUM VOLUME INDEX: 28.7 ML/M2
LEFT ATRIUM VOLUME MOD: 46.71 CM3
LEFT ATRIUM VOLUME: 47.85 CM3
LEFT INTERNAL DIMENSION IN SYSTOLE: 2.96 CM (ref 2.1–4)
LEFT VENTRICLE DIASTOLIC VOLUME INDEX: 50.72 ML/M2
LEFT VENTRICLE DIASTOLIC VOLUME: 84.71 ML
LEFT VENTRICLE MASS INDEX: 46 G/M2
LEFT VENTRICLE SYSTOLIC VOLUME INDEX: 20.2 ML/M2
LEFT VENTRICLE SYSTOLIC VOLUME: 33.81 ML
LEFT VENTRICULAR INTERNAL DIMENSION IN DIASTOLE: 4.34 CM (ref 3.5–6)
LEFT VENTRICULAR MASS: 76.36 G
LV LATERAL E/E' RATIO: 8.4 M/S
LV SEPTAL E/E' RATIO: 12 M/S
LYMPHOCYTES # BLD AUTO: 1 K/UL (ref 1–4.8)
LYMPHOCYTES NFR BLD: 22.7 % (ref 18–48)
MCH RBC QN AUTO: 25.9 PG (ref 27–31)
MCHC RBC AUTO-ENTMCNC: 30.2 G/DL (ref 32–36)
MCV RBC AUTO: 86 FL (ref 82–98)
MONOCYTES # BLD AUTO: 0.5 K/UL (ref 0.3–1)
MONOCYTES NFR BLD: 11.6 % (ref 4–15)
MV A" WAVE DURATION": 12.18 MSEC
MV MEAN GRADIENT: 1 MMHG
MV PEAK A VEL: 0.77 M/S
MV PEAK E VEL: 0.84 M/S
MV PEAK GRADIENT: 5 MMHG
MV STENOSIS PRESSURE HALF TIME: 45.06 MS
MV VALVE AREA BY CONTINUITY EQUATION: 2.98 CM2
MV VALVE AREA P 1/2 METHOD: 4.88 CM2
NEUTROPHILS # BLD AUTO: 2.5 K/UL (ref 1.8–7.7)
NEUTROPHILS NFR BLD: 59.5 % (ref 38–73)
NRBC BLD-RTO: 0 /100 WBC
PISA MRMAX VEL: 0.04 M/S
PISA TR MAX VEL: 3.36 M/S
PLATELET # BLD AUTO: 282 K/UL (ref 150–450)
PMV BLD AUTO: 10.2 FL (ref 9.2–12.9)
POCT GLUCOSE: 102 MG/DL (ref 70–110)
POCT GLUCOSE: 108 MG/DL (ref 70–110)
POCT GLUCOSE: 149 MG/DL (ref 70–110)
POCT GLUCOSE: 90 MG/DL (ref 70–110)
POCT GLUCOSE: 95 MG/DL (ref 70–110)
POCT GLUCOSE: 97 MG/DL (ref 70–110)
POTASSIUM SERPL-SCNC: 3.6 MMOL/L (ref 3.5–5.1)
PROT SERPL-MCNC: 7.3 G/DL (ref 6–8.4)
PULM VEIN S/D RATIO: 1.22
PV PEAK D VEL: 0.6 M/S
PV PEAK S VEL: 0.73 M/S
PV PEAK VELOCITY: 0.91 CM/S
RA MAJOR: 4.12 CM
RA PRESSURE: 3 MMHG
RA WIDTH: 3.53 CM
RBC # BLD AUTO: 3.09 M/UL (ref 4–5.4)
RIGHT VENTRICULAR END-DIASTOLIC DIMENSION: 2.86 CM
RV TISSUE DOPPLER FREE WALL SYSTOLIC VELOCITY 1 (APICAL 4 CHAMBER VIEW): 14.39 CM/S
SINUS: 2.4 CM
SODIUM SERPL-SCNC: 138 MMOL/L (ref 136–145)
STJ: 2.42 CM
TDI LATERAL: 0.1 M/S
TDI SEPTAL: 0.07 M/S
TDI: 0.09 M/S
TR MAX PG: 45 MMHG
TV REST PULMONARY ARTERY PRESSURE: 48 MMHG
VANCOMYCIN TROUGH SERPL-MCNC: 14.6 UG/ML (ref 10–22)
WBC # BLD AUTO: 4.22 K/UL (ref 3.9–12.7)

## 2022-03-21 PROCEDURE — 99232 SBSQ HOSP IP/OBS MODERATE 35: CPT | Mod: ,,, | Performed by: INTERNAL MEDICINE

## 2022-03-21 PROCEDURE — 99900026 HC AIRWAY MAINTENANCE (STAT)

## 2022-03-21 PROCEDURE — 27000221 HC OXYGEN, UP TO 24 HOURS

## 2022-03-21 PROCEDURE — 99232 PR SUBSEQUENT HOSPITAL CARE,LEVL II: ICD-10-PCS | Mod: ,,, | Performed by: INTERNAL MEDICINE

## 2022-03-21 PROCEDURE — A9698 NON-RAD CONTRAST MATERIALNOC: HCPCS | Performed by: INTERNAL MEDICINE

## 2022-03-21 PROCEDURE — 94003 VENT MGMT INPAT SUBQ DAY: CPT

## 2022-03-21 PROCEDURE — 25500020 PHARM REV CODE 255: Performed by: INTERNAL MEDICINE

## 2022-03-21 PROCEDURE — 99223 PR INITIAL HOSPITAL CARE,LEVL III: ICD-10-PCS | Mod: ,,, | Performed by: INTERNAL MEDICINE

## 2022-03-21 PROCEDURE — 99223 1ST HOSP IP/OBS HIGH 75: CPT | Mod: ,,, | Performed by: INTERNAL MEDICINE

## 2022-03-21 PROCEDURE — 99291 CRITICAL CARE FIRST HOUR: CPT | Mod: S$GLB,,, | Performed by: STUDENT IN AN ORGANIZED HEALTH CARE EDUCATION/TRAINING PROGRAM

## 2022-03-21 PROCEDURE — 27000207 HC ISOLATION

## 2022-03-21 PROCEDURE — 99900035 HC TECH TIME PER 15 MIN (STAT)

## 2022-03-21 PROCEDURE — 63600175 PHARM REV CODE 636 W HCPCS: Performed by: INTERNAL MEDICINE

## 2022-03-21 PROCEDURE — 94640 AIRWAY INHALATION TREATMENT: CPT

## 2022-03-21 PROCEDURE — 87040 BLOOD CULTURE FOR BACTERIA: CPT | Performed by: INTERNAL MEDICINE

## 2022-03-21 PROCEDURE — 63600175 PHARM REV CODE 636 W HCPCS: Performed by: NURSE PRACTITIONER

## 2022-03-21 PROCEDURE — 25000242 PHARM REV CODE 250 ALT 637 W/ HCPCS: Performed by: INTERNAL MEDICINE

## 2022-03-21 PROCEDURE — 85025 COMPLETE CBC W/AUTO DIFF WBC: CPT | Performed by: HOSPITALIST

## 2022-03-21 PROCEDURE — 63600175 PHARM REV CODE 636 W HCPCS: Performed by: HOSPITALIST

## 2022-03-21 PROCEDURE — 25000003 PHARM REV CODE 250: Performed by: INTERNAL MEDICINE

## 2022-03-21 PROCEDURE — 99291 PR CRITICAL CARE, E/M 30-74 MINUTES: ICD-10-PCS | Mod: S$GLB,,, | Performed by: STUDENT IN AN ORGANIZED HEALTH CARE EDUCATION/TRAINING PROGRAM

## 2022-03-21 PROCEDURE — 99900022

## 2022-03-21 PROCEDURE — 20000000 HC ICU ROOM

## 2022-03-21 PROCEDURE — 80202 ASSAY OF VANCOMYCIN: CPT | Performed by: HOSPITALIST

## 2022-03-21 PROCEDURE — 63600175 PHARM REV CODE 636 W HCPCS: Performed by: STUDENT IN AN ORGANIZED HEALTH CARE EDUCATION/TRAINING PROGRAM

## 2022-03-21 PROCEDURE — 94761 N-INVAS EAR/PLS OXIMETRY MLT: CPT

## 2022-03-21 PROCEDURE — 36415 COLL VENOUS BLD VENIPUNCTURE: CPT | Performed by: INTERNAL MEDICINE

## 2022-03-21 PROCEDURE — 25000003 PHARM REV CODE 250: Performed by: HOSPITALIST

## 2022-03-21 PROCEDURE — 80053 COMPREHEN METABOLIC PANEL: CPT | Performed by: HOSPITALIST

## 2022-03-21 PROCEDURE — 25000003 PHARM REV CODE 250: Performed by: STUDENT IN AN ORGANIZED HEALTH CARE EDUCATION/TRAINING PROGRAM

## 2022-03-21 PROCEDURE — A4216 STERILE WATER/SALINE, 10 ML: HCPCS | Performed by: INTERNAL MEDICINE

## 2022-03-21 RX ORDER — POTASSIUM CHLORIDE 14.9 MG/ML
40 INJECTION INTRAVENOUS ONCE
Status: COMPLETED | OUTPATIENT
Start: 2022-03-21 | End: 2022-03-21

## 2022-03-21 RX ORDER — POTASSIUM CHLORIDE 7.46 G/1000ML
10 INJECTION, SOLUTION INTRAVENOUS
Status: DISCONTINUED | OUTPATIENT
Start: 2022-03-21 | End: 2022-03-21

## 2022-03-21 RX ORDER — MEROPENEM AND SODIUM CHLORIDE 1 G/50ML
1 INJECTION, SOLUTION INTRAVENOUS
Status: DISCONTINUED | OUTPATIENT
Start: 2022-03-21 | End: 2022-03-26 | Stop reason: HOSPADM

## 2022-03-21 RX ADMIN — SODIUM CHLORIDE, PRESERVATIVE FREE 10 ML: 5 INJECTION INTRAVENOUS at 05:03

## 2022-03-21 RX ADMIN — MIDODRINE HYDROCHLORIDE 10 MG: 5 TABLET ORAL at 08:03

## 2022-03-21 RX ADMIN — SODIUM CHLORIDE, PRESERVATIVE FREE 10 ML: 5 INJECTION INTRAVENOUS at 01:03

## 2022-03-21 RX ADMIN — IPRATROPIUM BROMIDE AND ALBUTEROL SULFATE 3 ML: 2.5; .5 SOLUTION RESPIRATORY (INHALATION) at 07:03

## 2022-03-21 RX ADMIN — HYPROMELLOSE 2910 1 DROP: 5 SOLUTION OPHTHALMIC at 05:03

## 2022-03-21 RX ADMIN — MUPIROCIN: 20 OINTMENT TOPICAL at 09:03

## 2022-03-21 RX ADMIN — ACETYLCYSTEINE 4 ML: 100 INHALANT RESPIRATORY (INHALATION) at 12:03

## 2022-03-21 RX ADMIN — Medication: at 05:03

## 2022-03-21 RX ADMIN — ACETYLCYSTEINE 4 ML: 100 INHALANT RESPIRATORY (INHALATION) at 07:03

## 2022-03-21 RX ADMIN — MEROPENEM AND SODIUM CHLORIDE 1 G: 1 INJECTION, SOLUTION INTRAVENOUS at 08:03

## 2022-03-21 RX ADMIN — ACETYLCYSTEINE 4 ML: 100 INHALANT RESPIRATORY (INHALATION) at 06:03

## 2022-03-21 RX ADMIN — IPRATROPIUM BROMIDE AND ALBUTEROL SULFATE 3 ML: 2.5; .5 SOLUTION RESPIRATORY (INHALATION) at 12:03

## 2022-03-21 RX ADMIN — IOHEXOL 500 ML: 9 SOLUTION ORAL at 08:03

## 2022-03-21 RX ADMIN — ONDANSETRON 4 MG: 2 INJECTION INTRAMUSCULAR; INTRAVENOUS at 08:03

## 2022-03-21 RX ADMIN — IOHEXOL 700 ML: 9 SOLUTION ORAL at 09:03

## 2022-03-21 RX ADMIN — DEXTROSE AND SODIUM CHLORIDE: 5; .9 INJECTION, SOLUTION INTRAVENOUS at 02:03

## 2022-03-21 RX ADMIN — ENOXAPARIN SODIUM 40 MG: 40 INJECTION SUBCUTANEOUS at 05:03

## 2022-03-21 RX ADMIN — MEROPENEM 2 G: 1 INJECTION, POWDER, FOR SOLUTION INTRAVENOUS at 01:03

## 2022-03-21 RX ADMIN — IOHEXOL 75 ML: 350 INJECTION, SOLUTION INTRAVENOUS at 10:03

## 2022-03-21 RX ADMIN — HYPROMELLOSE 2910 1 DROP: 5 SOLUTION OPHTHALMIC at 08:03

## 2022-03-21 RX ADMIN — AMIKACIN SULFATE 825 MG: 500 INJECTION, SOLUTION INTRAMUSCULAR; INTRAVENOUS at 05:03

## 2022-03-21 RX ADMIN — VANCOMYCIN HYDROCHLORIDE 1250 MG: 1.25 INJECTION, POWDER, LYOPHILIZED, FOR SOLUTION INTRAVENOUS at 10:03

## 2022-03-21 RX ADMIN — MIDODRINE HYDROCHLORIDE 10 MG: 5 TABLET ORAL at 03:03

## 2022-03-21 RX ADMIN — DEXTROSE AND SODIUM CHLORIDE: 5; .9 INJECTION, SOLUTION INTRAVENOUS at 12:03

## 2022-03-21 RX ADMIN — IPRATROPIUM BROMIDE AND ALBUTEROL SULFATE 3 ML: 2.5; .5 SOLUTION RESPIRATORY (INHALATION) at 06:03

## 2022-03-21 RX ADMIN — MUPIROCIN: 20 OINTMENT TOPICAL at 08:03

## 2022-03-21 RX ADMIN — MEROPENEM 2 G: 1 INJECTION, POWDER, FOR SOLUTION INTRAVENOUS at 05:03

## 2022-03-21 RX ADMIN — DIATRIZOATE MEGLUMINE AND DIATRIZOATE SODIUM 30 ML: 660; 100 LIQUID ORAL; RECTAL at 07:03

## 2022-03-21 RX ADMIN — Medication 125 MG: at 08:03

## 2022-03-21 RX ADMIN — COLLAGENASE SANTYL: 250 OINTMENT TOPICAL at 08:03

## 2022-03-21 RX ADMIN — POTASSIUM CHLORIDE 40 MEQ: 14.9 INJECTION, SOLUTION INTRAVENOUS at 03:03

## 2022-03-21 RX ADMIN — SODIUM CHLORIDE, PRESERVATIVE FREE 10 ML: 5 INJECTION INTRAVENOUS at 06:03

## 2022-03-21 NOTE — PROGRESS NOTES
[facility]  Adult Nutrition  Progress Note    SUMMARY   Intervention: enteral nutrition therapy, collaboration with care providers      Recommendation:  1) Continue TF when medically able   Isosource 1.5 @ 520 ml/hr advancing to goal of 50 ml/hr + 110 ml flush q 4 hr + beneprotein BID +Benny BID   ( provides 1800 kcal ( > 100% EEN), 81 g protein + bene ( 89% EPN), 912 ml free water)  -if not hemodynamically stable Nutren 1.5 same rate appropriate      2) If pt has trouble tolerating TF consider PPN  D 5 AA 4.25 @ 75 ml/hr + standard lipids ( 1112 kcal, 76 g protein)     3) weigh weekly, replete lytes as needed     Goals: 1) TF meeting goal at f/u  Nutrition Goal Status: meeting-continues  Communication of RD Recs: POC, sticky note, reviewed with RN     Assessment and Plan     Inadequate energy/protein intake  R/t NPO, trouble swallowing  As evidenced by inadequate TF infusion x 1 day, stage 4 coccyx wound   Intervention: above  improving     Malnutrition Assessment  Skin (Micronutrient):  (Hardik = 10 , stage 4 coccyx wound, elbow wound noted last admit)  Teeth (Micronutrient): missing some  Micronutrient Evaluation: suspected deficiency  Micronutrient Evaluation Comments: check iron              Edema (Fluid Accumulation): none     Reason for Assessment     Reason For Assessment: follow up  Diagnosis:  PNA  Relevant Medical History: recurrent pneumonia/ UTI, [place] resident, HTN, GERD, PEG/trach, anoxic brain injury, cardiac arrest  Interdisciplinary Rounds: did not attend     General Information Comments: 62 y/o female admitted from [place] PEG/trach dependent. IN ED, no diagnosis/flowsheet info noted yet. NFPE to be completed at f/u. MD asking for TF recommendations. PTA receiving Isosource 1.5 @ 50 ml/hr + 250 ml flush q 6 hr at [place] + arginaid packet BID. Wt fluctuations x >1 year.  3/21/22 Pt was tolerating TF @ goal. Some leakage around PEG seen, TF held today and GI consulted. If KUB  ok, fine to continue TF per GI.     Nutrition Discharge Planning: To be determined- TF PTA Isosource 1.5 @ 20 ml/hr advancing to goal of 50 ml/hr + 250 ml flush q 6 hr + Benny or arginaid BID     Nutrition Risk Screen     Nutrition Risk Screen: tube feeding or parenteral nutrition,dysphagia or difficulty swallowing     Nutrition/Diet History     Patient Reported Diet/Restrictions/Preferences: no oral intake  Spiritual, Cultural Beliefs, Congregation Practices, Values that Affect Care: no  Food Allergies: NKFA  Factors Affecting Nutritional Intake: NPO,difficulty/impaired swallowing     Anthropometrics  Height Method: Stated  Height: 5'  Height (inches): 60 in  Weight Method: Bed Scale  Weight: 70/2 kg 3/21/21, 65.8 kg 3/17/22  Weight (lb): 154 lb ( wt fluctuations)  Ideal Body Weight (IBW), Female: 100 lb  BMI (Calculated): 28 kg/m2 admission  BMI Grade: overweight  Usual Body Weight (UBW), k.1 kg (20)  69.8 kg 21, , 71.7 kg 21, 76.6 kg 22, 72.3 kg 1/10/21, 65.7 kg 21, 59.4 kg (10/25/21), 62.3 kg 21     Lab/Procedures/Meds     Pertinent Labs Reviewed: reviewed  BMP  Lab Results   Component Value Date     2022    K 3.6 2022     2022    CO2 25 2022    BUN 5 (L) 2022    CREATININE 0.4 (L) 2022    CALCIUM 9.2 2022    ANIONGAP 10 2022    ESTGFRAFRICA >60 2022    EGFRNONAA >60 2022     Recent Labs   Lab 22  1235   POCTGLUCOSE 95     ]  Lab Results   Component Value Date    ALBUMIN 2.4 (L) 2022          Pertinent Medications Reviewed: reviewed  KCl, scopolamine, D5 NS @ 75 ml/hr, insulin, zofran, polyethylene glycol     Estimated/Assessed Needs     Weight Used For Calorie Calculations: 65.8 kg  Energy Calorie Requirements (kcal): MSJ x 1.4 = 1400 kcal needs possibly more? As not gaining wt on home TF  Energy Need Method: no Ve total available ( MSJ)  Protein Requirements: 1.5 g protein/kg ( wound/ICU) =  98 g  Weight Used For Protein Calculations: 65.8 kg  Fluid Requirements (mL): 1400 ml  Estimated Fluid Requirement Method: RDA Method  CHO Requirement: N/A        Nutrition Prescription Ordered     Current Diet Order: NPO     Evaluation of Received Nutrient/Fluid Intake     Energy Calories Required: not meeting  Protein Required: not meeting  Fluid Required: meeting needs  Tolerance: tolerating  IVF @ 75 ml/hr  % Intake of Estimated Energy Needs: 21% D5  % Meal Intake:NPO         Nutrition Risk     Level of Risk/Frequency of Follow-up: moderate 2 x weekly        Monitor and Evaluation     Food and Nutrient Intake: energy intake  Food and Nutrient Adminstration: enteral and parenteral nutrition administration  Anthropometric Measurements: weight  Biochemical Data, Medical Tests and Procedures: electrolyte and renal panel,gastrointestinal profile,glucose/endocrine profile  Nutrition-Focused Physical Findings: overall appearance , skin        Nutrition Follow-Up     RD Follow-up?: Yes

## 2022-03-21 NOTE — PROGRESS NOTES
Daily Progress Note  [facility]      Genna Felix (MRN: [id number])  Admitting Service: Hospital Medicine  Date of Admission: 3/17/2022   Primary Care Provider: Primary Doctor No    ?  Chief complaint: hypothermia   ?  History of Present Illness:         61F h/o PE, anoxic brain injury, now vent/trach-PEG feeding dependent also with ?cholecystostomy tube in place who is brought from [place] for hypothermia found to have a new pneumonia.       On the morning of admission trended found the patient with her warming blanket not functioning.  At that time she was found to be hypothermic leading to their transferring her here.    All information derived from ER team and [place] chart given patient's current cognitive state.       ?  ?  Subjective:     Unable to obtain given current cognitive state     PHYSICAL EXAM  Vitals: /68 (BP Location: Left arm, Patient Position: Lying)   Pulse 86   Temp 96.3 °F (35.7 °C)   Resp (!) 27   Ht 5' (1.524 m)   Wt 70.2 kg (154 lb 12.2 oz)   SpO2 100%   BMI 30.23 kg/m²  Body mass index is 30.23 kg/m².    General: NAD, responds to pain   HEENT: upward gaze. Left sclera mildly erythematous   Cardiovascular: RRR  Respiratory: lungs CTAB  GI: abdomen S/NT/ND, +BS. ?cholecystostomy tube present   PEG tube present   Extremities: no edema  MSK: contractures present   Neuro/Psych: upward gaze. Contractures   Elbow: Pressure ulcer present       EKG and radiographs from the past 24h: reviewed and personally interpreted if available.      LABS    Recent Labs     03/21/22  0428   WBC 4.22   HGB 8.0*        ?  Recent Labs     03/21/22  0428      K 3.6   CO2 25   BUN 5*   CREATININE 0.4*     ?  Recent Labs     03/21/22  0428   BILITOT 0.8   AST 57*   ALT 89*   ALKPHOS 203*   PROT 7.3     ?  No results for input(s): INR, PT, PTT in the last 72 hours.  ?    ASSESSMENT & PLAN      61F h/o PE, anoxic brain injury, now vent/trach-PEG feeding dependent also with  ?cholecystostomy tube in place who is brought from [place] for hypothermia found to have a new pneumonia.     1. Acute bacterial pneumonia 2/2 Serratia marcenscens and Providencia stuartii  -vanc/merrem  -warming blanket   -pulm consult    2. Enterococcus bacteremia   -continue vanco  -CTAP given biliary fossa drain  -echo       3.  UTI 2/2 pseudomonas aeruginosa  -Amikacin x1 on 3/21.    4. Pressure ulcer of right elbow.       Severe sepsis 2/2 pneumonia 2/2 above organisms, UTI 2/2 above organisms, 2/2 enterococcus bacteremia leading to respiratory distress, hypotension, leukopenia with hepatic dysfunction 2/2 unknown organism.  Lactate reviewed. Abx as above. Fluids as needed    3. Benzodiazepine dependence   -Given blood pressure and current cognitive state, holding home clonazepam 0.5 mg tid. Watch for withdrawal.   -no evidence of withdrawal. Still very somnolent. Continue to hold    4.  Chronic respiratory failure/ventilator dependent     5. Tube feeds held given possible GI tube malfunction .   -GI consulted  -IVF while tube feeds held.     6. CDiff ppx given history  -oral vanco    DVT ppx: lovenox       Code Status/Dispo: Full Code.   ?  Rajeev Stevenson    Date: 3/21/2022

## 2022-03-21 NOTE — PLAN OF CARE
POC reviewed. Patient nonverbal, trach - vent dependent. Peg tube in place, tube feeds help per MD - flushed peg tube this AM and leakage from insertion site noted, also noted to have audible air escaping. Left side of abdomen more firm compared to right side. PRN miralax given as patient has not had BM in several days. Hamilton catheter in place. Beth hugger in place. Blood glucose monitored. Safety and isolation precautions in place.

## 2022-03-21 NOTE — CONSULTS
Consult Note  Infectious Disease    Reason for Consult: VAP/Enterococcus bacteremia/ Pseudomonas UTI    HPI: Genna Felix is a 61 y.o. female known to our service for prior hospital admissions last one 1/12/22 for acute cholecystitis s/p IR drainage 1/13, resident of [facility], with past medical history of anoxic brain injury status post tracheostomy/peg tube, prior PE, hypertension, and GERD, send from  for hypothermia. History very limited; patient is non-verbal at baseline, no family present.     In the R, patient with BP 96/55, tachycardic 107, temp 94.2  Labs significant for leukopenia 3.7, PMNL 72.3%, l: 16.9%  Cr 0.5, patient with muscle wasting   Albumin 3.1 , AST 54/ALT 98  Procal 0.04, negative   UA with 3+ protein, 1+ leukocyte esterase, RBC 50, WBC 5 - urine culture grew  Pseudomonas aeruginosa   CXR: RLL infiltrate     Admitted for sever sepsis multifactorial; VAP/UTi/R elbow decubitus ulcer.    Empirically started on Vancomycin, Meropenem, and PO vancomycin.     Hospital course complicated by Enterococcus faecalis bacteremia.   ID consult for antibiotic recommendations.     Review of patient's allergies indicates:  No Known Allergies  Past Medical History:   Diagnosis Date    Anoxic brain damage 07/2020    Bedbound 07/2020    Cardiac arrest 07/2020    Cirrhosis     GERD (gastroesophageal reflux disease)     Hemangioma of intra-abdominal structure     Hypertension     Nursing home resident     Protein calorie malnutrition     Pulmonary embolus     Respiratory distress     S/P percutaneous endoscopic gastrostomy (PEG) tube placement 07/2020    Total self-care deficit 07/2020    Tracheostomy dependence     7/2020     Past Surgical History:   Procedure Laterality Date    PEG W/TRACHEOSTOMY PLACEMENT  07/27/2020    SKIN GRAFT      buttocks     Social History     Tobacco Use    Smoking status: Never Smoker    Smokeless tobacco: Never Used   Substance Use Topics     Alcohol use: Not Currently        Family History   Problem Relation Age of Onset    No Known Problems Mother     No Known Problems Father        Pertinent medications noted:     Review of Systems:   Unable to obtain, patient is non-verbal, no family at bedside.     EXAM & DIAGNOSTICS REVIEWED:   Vitals:     Temp:  [96.26 °F (35.7 °C)-99.32 °F (37.4 °C)]   Temp: 97.52 °F (36.4 °C) (03/21/22 1221)  Pulse: 86 (03/21/22 1221)  Resp: (!) 27 (03/21/22 1221)  BP: 129/75 (03/21/22 1200)  SpO2: 100 % (03/21/22 1221)    Intake/Output Summary (Last 24 hours) at 3/21/2022 1353  Last data filed at 3/21/2022 1200  Gross per 24 hour   Intake 85 ml   Output 3915 ml   Net -3830 ml     General:         Chronically ill-appearing, trach to vent FiO2 21%, in NAD.      Eyes:               Hyperemic conjunctiva both eyes, anicteric  ENT:                Very dry lower lip mucosa, desquamating skin noted, no oral thush Neck:              Supple  Lungs:            R base rhonchi, L mostly clear  Thick purulent secretions noted   Heart:              S1/S2+, regular rhythm, no murmurs  Abd:                RUQ CLARICE drain in place since 1/13/22   bilious fluid                          PEG tube in place, no evidence of redness or purulent drainage from ostomy, +BS, soft, non tender, moderately distended  :                  Hamilton, cloudy urine   Musc:              Flexure contractures on hands  Skin:               Warm, no rash   Wound:             Neuro:  Not following commands  Psych:            unable to perform   Lymphatic:      Extrem:           No LE edema b/l - contracted  VAD:                Peripheral IV      PICC line R 3/17                                                        Isolation:  Contact    3/18:      Right elbow     Left foot     1/4/22:       Sacrum                        Right hand    General Labs reviewed:  Recent Labs   Lab 03/17/22  0843 03/18/22  0624 03/21/22  0428   WBC 3.72* 7.06 4.22   HGB 11.3* 8.2* 8.0*    HCT 37.3 27.4* 26.5*    260 282       Recent Labs   Lab 03/17/22  0843 03/18/22  0624 03/21/22  0428   * 137 138   K 4.6 3.9 3.6    108 103   CO2 22* 21* 25   BUN 25* 16 5*   CREATININE 0.5 0.5 0.4*   CALCIUM 9.9 8.6* 9.2   PROT 8.7* 6.8 7.3   BILITOT 1.0 0.9 0.8   ALKPHOS 249* 188* 203*   ALT 98* 71* 89*   AST 54* 34 57*     No results for input(s): CRP in the last 168 hours.  No results for input(s): SEDRATE in the last 168 hours.    Estimated Creatinine Clearance: 132.4 mL/min (A) (based on SCr of 0.4 mg/dL (L)).     Micro:  Microbiology Results (last 7 days)     Procedure Component Value Units Date/Time    Aerobic culture [[id number]]  (Abnormal)  (Susceptibility) Collected: 03/18/22 1630    Order Status: Completed Specimen: Wound from Elbow, Right Updated: 03/21/22 1312     Aerobic Bacterial Culture GRAM NEGATIVE CHRISTINA  Moderate  Identification and susceptibility pending        METHICILLIN RESISTANT STAPHYLOCOCCUS AUREUS  Many        PRESUMPTIVE PROTEUS SPECIES  Few  Identification and susceptibility pending      Urine culture [[id number]] Collected: 03/20/22 0100    Order Status: Completed Specimen: Urine Updated: 03/21/22 1152     Urine Culture, Routine No growth    Narrative:      Specimen Source->Urine    Blood culture [[id number]]     Order Status: Sent Specimen: Blood     Urine Culture High Risk [[id number]]  (Abnormal)  (Susceptibility) Collected: 03/18/22 1010    Order Status: Completed Specimen: Urine, Catheterized Updated: 03/21/22 0952     Urine Culture, Routine PSEUDOMONAS AERUGINOSA   10,000 - 49,999 cfu/ml      Narrative:      Indicated criteria for high risk culture:->Other  Other (specify):->sepsis    Culture, Respiratory with Gram Stain [[id number]]  (Abnormal)  (Susceptibility) Collected: 03/17/22 1337    Order Status: Completed Specimen: Respiratory from Tracheal Aspirate Updated: 03/21/22 0912     Respiratory Culture No S aureus or Pseudomonas isolated.      SERRATIA  MARCESCENS  Many        PROVIDENCIA STUARTII  Many  Normal respiratory kenny also present       Gram Stain (Respiratory) Rare WBC's     Gram Stain (Respiratory) Many Gram positive cocci     Gram Stain (Respiratory) Many Gram negative rods    Blood culture x two cultures. Draw prior to antibiotics. [[id number]] Collected: 03/17/22 0854    Order Status: Completed Specimen: Blood from Peripheral, Right Hand Updated: 03/20/22 1412     Blood Culture, Routine No Growth to date      No Growth to date      No Growth to date      No Growth to date    Narrative:      Aerobic and anaerobic    Blood culture x two cultures. Draw prior to antibiotics. [[id number]]  (Abnormal)  (Susceptibility) Collected: 03/17/22 0844    Order Status: Completed Specimen: Blood Updated: 03/20/22 1249     Blood Culture, Routine Gram stain aer bottle: Gram positive cocci in chains resembling Strep       Results called to and read back by: RASHEED LACEY RN  03/18/2022        08:06      ENTEROCOCCUS FAECALIS    Narrative:      Aerobic and anaerobic          Imaging Reviewed:  CXR: Right lower lobe pneumonia.  Tracheostomy tube in place.  Abdominal US: Cholelithiasis in a contracted gallbladder.  Mild hydronephrosis of the right kidney.    Cardiology:       IMPRESSION & PLAN     1. Sepsis multifactorial; VAP/UTi/R elbow decubitus ulcer   Procal 0.04   Blood cultures 3/17 Enterococcus faecalis    Urine culture 3/18 CRP Pseudomonas sensitive to Amikacin    Respiratory culture 3/17 Serratia marcescens and Providencia stuartii   Wound cultures R elbow 3/18 GNT, MRSA, Proteus species, pending final    2.  PMHx cardiac arrest, anoxic brain injury, bed-bound, cirrhosis, GERD, nursing home resident, PE, tracheostomy and PEG since July 2020.    Recommendations:  CT scan abdomen/pelvis to rule out acute pathology   Repeat blood cultures x 2 sets  ECHO to rule out endocarditis   Amikacin 825mg IV once for  Pseudomonas UTi  Continue Vancomycin IV, keep level  around 15  Adjust Meropenem to 1g IV q8h    Trend CRP and procalcitonin   Artifical tears 4 times a day   Consider Pain & Palliative to discuss goals of care   Follow cultures   Aspiration precautions   Will follow    D/w nursing     I have spent > 35 minutes providing critical care services for this pt for the above diagnoses. These services have included pt evaluation, pt exam, discussions with staff, chart review, data review, note preparation and . The patient has life threatening illness with a high risk of decompensation and/or death.    Medical Decision Making during this encounter was  [_] Low Complexity  [_] Moderate Complexity  [xx] High Complexity

## 2022-03-21 NOTE — SUBJECTIVE & OBJECTIVE
Interval History:   Nurse last night noticed lot of drainage coming from PEG site; appeared to be feeding.  Another nurse was assigned at some time during the night.  Day nurse did not see any drainage, but she did feel left sided firmness and audible gas coming from tube site when abdomen was palpated.    Review of Systems   Unable to perform ROS: Patient unresponsive   Objective:     Vital Signs (Most Recent):  Temp: 98.78 °F (37.1 °C) (03/20/22 1916)  Pulse: 85 (03/20/22 1916)  Resp: 20 (03/20/22 1916)  BP: (!) 119/59 (03/20/22 1830)  SpO2: 97 % (03/20/22 1916)   Vital Signs (24h Range):  Temp:  [96.08 °F (35.6 °C)-98.78 °F (37.1 °C)] 98.78 °F (37.1 °C)  Pulse:  [] 85  Resp:  [4-63] 20  SpO2:  [90 %-100 %] 97 %  BP: (101-128)/(51-74) 119/59     Weight: 70.4 kg (155 lb 3.3 oz)  Body mass index is 30.31 kg/m².    Intake/Output Summary (Last 24 hours) at 3/20/2022 1934  Last data filed at 3/20/2022 1800  Gross per 24 hour   Intake 1187.55 ml   Output 2585 ml   Net -1397.45 ml        Physical Exam  Constitutional:       General: She is not in acute distress.  Eyes:      General:         Right eye: No discharge.         Left eye: No discharge.   Neck:      Vascular: No JVD.   Cardiovascular:      Rate and Rhythm: Normal rate and regular rhythm.   Pulmonary:      Breath sounds: Normal breath sounds.      Comments: Tracheostomy connected to ventilator  Abdominal:      General: Abdomen is flat. Bowel sounds are normal. There is no distension.      Palpations: Abdomen is soft.      Comments: Sounds like gas coming from PEG site when abdomen is palpated   Musculoskeletal:      Right lower leg: No edema.      Left lower leg: No edema.   Skin:     General: Skin is warm and moist.      Findings: No rash.   Neurological:      Mental Status: She is alert.      Comments: Not responding to questions.  Not following commands.  She has an upward gaze.       Significant Labs: All pertinent labs within the past 24 hours have  been reviewed.    Significant Imaging: I have reviewed all pertinent imaging results/findings within the past 24 hours.

## 2022-03-21 NOTE — CONSULTS
CC: leakage around PEG    HPI 61 y.o. female with history of PE, anoxic brain injury, now vent/trach-PEG feeding dependent brought from [place] for hypothermia found to have a new pneumonia. GI was consulted for evaluation of leakage noted around PEG tube which was concerning. When patient was moved, leakage of bile and tube feeds seen per nursing. KUB showed PEG tube in position overlying the stomach. Also history notable for cholecystostomy tube placement. H/H 8/26.5. MCV 86. Plt 282. Tbili 0.8, AST 57, ALT 89, . Medical records reviewed. Additional history supplemented by nursing.     Past Medical History:   Diagnosis Date    Anoxic brain damage 07/2020    Bedbound 07/2020    Cardiac arrest 07/2020    Cirrhosis     GERD (gastroesophageal reflux disease)     Hemangioma of intra-abdominal structure     Hypertension     Nursing home resident     Protein calorie malnutrition     Pulmonary embolus     Respiratory distress     S/P percutaneous endoscopic gastrostomy (PEG) tube placement 07/2020    Total self-care deficit 07/2020    Tracheostomy dependence     7/2020     Past Surgical History:   Procedure Laterality Date    PEG W/TRACHEOSTOMY PLACEMENT  07/27/2020    SKIN GRAFT      buttocks     Social History  Social History     Tobacco Use    Smoking status: Never Smoker    Smokeless tobacco: Never Used   Substance Use Topics    Alcohol use: Not Currently    Drug use: Not Currently     Family History   Problem Relation Age of Onset    No Known Problems Mother     No Known Problems Father      Review of Systems  Unable to obtain due to vented and anoxic brain injury    Physical Examination  /75 (BP Location: Left arm, Patient Position: Lying)   Pulse 86   Temp 97.52 °F (36.4 °C)   Resp (!) 27   Ht 5' (1.524 m)   Wt 70.2 kg (154 lb 12.2 oz)   SpO2 100%   BMI 30.23 kg/m²   General appearance: opens eyes, chronically ill appearing, lying in bed, vented,   HENT: Normocephalic, trach  in place  Eyes: conjunctivae/corneas clear  Lungs: vented breath sounds  Heart: regular rate and rhythm without rub; no displacement of the PMI   Abdomen: soft, mild distention noted, no organomegaly  Extremities: extremities symmetric; no clubbing, cyanosis, or edema  Integument: Skin color, texture, turgor normal; no rashes; hair distrubution normal  Neurologic:   Psychiatric: +evidence of impaired cognition     Labs:  Lab Results   Component Value Date    WBC 4.22 03/21/2022    HGB 8.0 (L) 03/21/2022    HCT 26.5 (L) 03/21/2022    MCV 86 03/21/2022     03/21/2022     CMP  Sodium   Date Value Ref Range Status   03/21/2022 138 136 - 145 mmol/L Final     Potassium   Date Value Ref Range Status   03/21/2022 3.6 3.5 - 5.1 mmol/L Final     Chloride   Date Value Ref Range Status   03/21/2022 103 95 - 110 mmol/L Final     CO2   Date Value Ref Range Status   03/21/2022 25 23 - 29 mmol/L Final     Glucose   Date Value Ref Range Status   03/21/2022 87 70 - 110 mg/dL Final     BUN   Date Value Ref Range Status   03/21/2022 5 (L) 6 - 20 mg/dL Final     Creatinine   Date Value Ref Range Status   03/21/2022 0.4 (L) 0.5 - 1.4 mg/dL Final     Calcium   Date Value Ref Range Status   03/21/2022 9.2 8.7 - 10.5 mg/dL Final     Total Protein   Date Value Ref Range Status   03/21/2022 7.3 6.0 - 8.4 g/dL Final     Albumin   Date Value Ref Range Status   03/21/2022 2.4 (L) 3.5 - 5.2 g/dL Final     Total Bilirubin   Date Value Ref Range Status   03/21/2022 0.8 0.1 - 1.0 mg/dL Final     Comment:     For infants and newborns, interpretation of results should be based  on gestational age, weight and in agreement with clinical  observations.    Premature Infant recommended reference ranges:  Up to 24 hours.............<8.0 mg/dL  Up to 48 hours............<12.0 mg/dL  3-5 days..................<15.0 mg/dL  6-29 days.................<15.0 mg/dL       Alkaline Phosphatase   Date Value Ref Range Status   03/21/2022 203 (H) 55 - 135 U/L  Final     AST   Date Value Ref Range Status   03/21/2022 57 (H) 10 - 40 U/L Final     ALT   Date Value Ref Range Status   03/21/2022 89 (H) 10 - 44 U/L Final     Anion Gap   Date Value Ref Range Status   03/21/2022 10 8 - 16 mmol/L Final     eGFR if    Date Value Ref Range Status   03/21/2022 >60 >60 mL/min/1.73 m^2 Final     eGFR if non    Date Value Ref Range Status   03/21/2022 >60 >60 mL/min/1.73 m^2 Final     Comment:     Calculation used to obtain the estimated glomerular filtration  rate (eGFR) is the CKD-EPI equation.        Imaging:  US Abd: Cholelithiasis in a contracted gallbladder.  Mild hydronephrosis of the right kidney.     Independently reviewed    Assessment:   61 y.o. female with history of PE, anoxic brain injury, now vent/trach-PEG feeding dependent brought from [place] for hypothermia and here for pneumonia. GI was consulted for evaluation of leakage noted around PEG tube which was concerning. When patient was moved, leakage of bile and tube feeds seen per nursing. KUB showed PEG tube in position overlying the stomach. Also history notable for cholecystostomy tube placement. H/H 8/26.5. MCV 86. Plt 282. Tbili 0.8, AST 57, ALT 89, .     Plan:   -KUB with gastrograffin administration via PEG  -OK to use PEG tube if above wnl  -Leakage around tube can be normal with changes in position, would continue to monitor, keep area clean and dry with bandages    Mg Duarte MD  [facility]  [address] [place] [address]  Office: [phone]  Fax: [phone]

## 2022-03-21 NOTE — PROGRESS NOTES
Pharmacokinetic Assessment Follow Up: IV Vancomycin    Vancomycin serum concentration assessment(s):    The trough level was drawn correctly and can be used to guide therapy at this time. The measurement is below the desired definitive target range of 15 to 20 mcg/mL.    Vancomycin Regimen Plan:    Continue regimen to Vancomycin 1250 mg IV every 18 hours with next serum trough concentration measured at 2130 prior to third dose on 3/22    Drug levels (last 3 results):  Recent Labs   Lab Result Units 03/19/22  2136 03/21/22  0925   Vancomycin-Trough ug/mL 10.9 14.6       Pharmacy will continue to follow and monitor vancomycin.    Please contact pharmacy at extension [phone] for questions regarding this assessment.    Thank you for the consult,   Mayra Spencer       Patient brief summary:  Genna Felix is a 61 y.o. female initiated on antimicrobial therapy with IV Vancomycin for treatment of lower respiratory infection      Drug Allergies:   Review of patient's allergies indicates:  No Known Allergies    Actual Body Weight:   70.2 kg    Renal Function:   Estimated Creatinine Clearance: 132.4 mL/min (A) (based on SCr of 0.4 mg/dL (L)).,     Dialysis Method (if applicable):  N/A    CBC (last 72 hours):  Recent Labs   Lab Result Units 03/21/22  0428   WBC K/uL 4.22   Hemoglobin g/dL 8.0*   Hematocrit % 26.5*   Platelets K/uL 282   Gran % % 59.5   Lymph % % 22.7   Mono % % 11.6   Eosinophil % % 5.5   Basophil % % 0.2   Differential Method  Automated       Metabolic Panel (last 72 hours):  Recent Labs   Lab Result Units 03/20/22  0100 03/21/22  0428   Sodium mmol/L  --  138   Potassium mmol/L  --  3.6   Chloride mmol/L  --  103   CO2 mmol/L  --  25   Glucose mg/dL  --  87   Glucose, UA  Negative  --    BUN mg/dL  --  5*   Creatinine mg/dL  --  0.4*   Albumin g/dL  --  2.4*   Total Bilirubin mg/dL  --  0.8   Alkaline Phosphatase U/L  --  203*   AST U/L  --  57*   ALT U/L  --  89*       Vancomycin Administrations:  vancomycin  given in the last 96 hours                     vancomycin 1.25 g in dextrose 5% 250 mL IVPB (ready to mix) (mg) 1,250 mg New Bag 03/21/22 1018     1,250 mg New Bag 03/20/22 1628     1,250 mg New Bag 03/19/22 2240    vancomycin 125 mg/5 mL oral solution 125 mg (mg) 125 mg Given 03/21/22 0816     125 mg Given 03/20/22 2001     125 mg Given  0956     125 mg Given 03/19/22 2134     125 mg Given  0921     125 mg Given 03/18/22 2054     125 mg Given  0905     125 mg Given 03/17/22 2214    vancomycin in dextrose 5 % 1 gram/250 mL IVPB 1,000 mg (mg) 1,000 mg New Bag 03/19/22 0348     1,000 mg New Bag 03/18/22 1032    vancomycin 1.25 g in dextrose 5% 250 mL IVPB (ready to mix) (mg) 1,250 mg New Bag 03/17/22 1100                    Microbiologic Results:  Microbiology Results (last 7 days)       Procedure Component Value Units Date/Time    Urine Culture High Risk [[id number]]  (Abnormal)  (Susceptibility) Collected: 03/18/22 1010    Order Status: Completed Specimen: Urine, Catheterized Updated: 03/21/22 0952     Urine Culture, Routine PSEUDOMONAS AERUGINOSA   10,000 - 49,999 cfu/ml      Narrative:      Indicated criteria for high risk culture:->Other  Other (specify):->sepsis    Culture, Respiratory with Gram Stain [[id number]]  (Abnormal)  (Susceptibility) Collected: 03/17/22 1337    Order Status: Completed Specimen: Respiratory from Tracheal Aspirate Updated: 03/21/22 0912     Respiratory Culture No S aureus or Pseudomonas isolated.      SERRATIA MARCESCENS  Many        PROVIDENCIA STUARTII  Many  Normal respiratory kenny also present       Gram Stain (Respiratory) Rare WBC's     Gram Stain (Respiratory) Many Gram positive cocci     Gram Stain (Respiratory) Many Gram negative rods    Blood culture x two cultures. Draw prior to antibiotics. [[id number]] Collected: 03/17/22 0854    Order Status: Completed Specimen: Blood from Peripheral, Right Hand Updated: 03/20/22 1412     Blood Culture, Routine No Growth to date      No  Growth to date      No Growth to date      No Growth to date    Narrative:      Aerobic and anaerobic    Aerobic culture [[id number]]  (Abnormal) Collected: 03/18/22 1630    Order Status: Completed Specimen: Wound from Elbow, Right Updated: 03/20/22 1307     Aerobic Bacterial Culture GRAM NEGATIVE CHRISTINA  Moderate  Identification and susceptibility pending        STAPHYLOCOCCUS AUREUS  Many  Susceptibility pending      Blood culture x two cultures. Draw prior to antibiotics. [[id number]]  (Abnormal)  (Susceptibility) Collected: 03/17/22 0844    Order Status: Completed Specimen: Blood Updated: 03/20/22 1249     Blood Culture, Routine Gram stain aer bottle: Gram positive cocci in chains resembling Strep       Results called to and read back by: RASHEED LACEY RN  03/18/2022        08:06      ENTEROCOCCUS FAECALIS    Narrative:      Aerobic and anaerobic    Urine culture [[id number]] Collected: 03/20/22 0100    Order Status: No result Specimen: Urine Updated: 03/20/22 0117

## 2022-03-21 NOTE — ASSESSMENT & PLAN NOTE
Evidence of this was discovered last night by nurse.  Will keep TF on hold.  Consult GI group for evaluation.

## 2022-03-21 NOTE — PROGRESS NOTES
"  03/21/2022      Admit Date: 3/17/2022  Genna Felix  New Patient Consult    Chief Complaint   Patient presents with    hypothermia     Pt sent by [facility] for hypothermia, nursing home staff reported to EMS t6hat pt warming blanket was not working       History of Present Illness:  Pt not arousing, with tactile stimuli her upper right gaze goes to left upper gaze.  Contracted.  On vent    Pt had prior pseudomonas , esbl klebsiella, and serratia  in sputum Jan 2022.  Review of record indicates h/o sz, cirrhosis, anoxic brain injury,   c diff, chr carroll.    Pt was NSR for  3 days 1/24/2022 -- Hospital Course:   Patient is a [facility] patient was unfortunately the victim of anoxic brain injury and persistent vegetative state who presented to the hospital with hypothermia.  She underwent evaluation for sepsis and started on broad-spectrum antimicrobial therapy.  She underwent further evaluation cause of, including CT scan of the abdomen as she had a recent cholecystostomy tube, as well as chest x-ray.  Likely etiology of her sepsis was secondary to pneumonia.  She improved with broad-spectrum antimicrobial therapy, and was discharged to long-term acute care to completed full course of 10 days of IV antibiotics.  Goals of care discussion was had with the patient's family, who opted for DNR, but continuation of the current treatment.       From ERP, Dr Nino hpi today:Genna Felix is a 61 y.o. female who presents to the ED via EMS from [facility] with an onset of hypothermia.  Patient was atop of a thermal blanket in order to regulate her temperature, but the blanket malfunctioned throughout the night causing patient's temperature to drop.  She presented to the ED with a rectal temp of 89 °F.  EMS notes the patient has a "blood shot" left eye and a steady "upward gaze."  Patient presents with a drain to the RUQ, booties to bilateral feet, trach in place and Carroll catheter, per EMS.  They note patient is a "full code" " and responds with a jump to startling noises only.  No other symptoms reported at this time.  Patient has a Hx of respiratory distress, hemangioma of intra-abdominal structure, tracheostomy dependence, anoxic brain damage, PE, HTN, protein claorie malnutrition, PEG tube placement, bed bound, total self-care deficit, nursing home resident, and cardiac arrest.  PSHx includes PEG with tracheostomy placement.  HPI is limited secondary to nonverbal patient.          Progress Note  PULMONARY    Admit Date: 3/17/2022   03/21/2022      SUBJECTIVE:     3/18 not arousing, more animated  3/19 no arousing,  3/21 no new c/o          PFSH and Allergies reviewed.    OBJECTIVE:     Vitals (Most recent):  Vitals:    03/21/22 0800   BP: (!) 113/59   Pulse: 105   Resp: 18   Temp: 98.78 °F (37.1 °C)       Vitals (24 hour range):  Temp:  [96.26 °F (35.7 °C)-99.32 °F (37.4 °C)]   Pulse:  []   Resp:  [17-58]   BP: (101-136)/(52-84)   SpO2:  [90 %-100 %]       Intake/Output Summary (Last 24 hours) at 3/21/2022 0850  Last data filed at 3/21/2022 0800  Gross per 24 hour   Intake 125 ml   Output 4065 ml   Net -3940 ml          Physical Exam:  The patient's neuro status (alertness,orientation,cognitive function,motor skills,), pharyngeal exam (oral lesions, hygiene, abn dentition,), Neck (jvd,mass,thyroid,nodes in neck and above/below clavicle),RESPIRATORY(symmetry,effort,fremitus,percussion,auscultation),  Cor(rhythm,heart tones including gallops,perfusion,edema)ABD(distention,hepatic&splenomegaly,tenderness,masses), Skin(rash,cyanosis),Psyc(affect,judgement,).  Exam negative except for these pertinent findings:    Less upward gaze and moves spontaneously today (required stimuli 3/17), chest is symmetric, no distress, normal percussion, normal fremitus and good normal breath sounds  Diffuse puffy edema    Radiographs reviewed: view by direct vision    Results for orders placed during the hospital encounter of 01/03/22    X-Ray Chest 1  View    Narrative  EXAMINATION:  XR CHEST 1 VIEW    CLINICAL HISTORY:  aspiration;    TECHNIQUE:  Single frontal view of the chest was performed.    COMPARISON:  01/09/2022    FINDINGS:  A tracheostomy cannula is present with the heart size is normal.  There is patchy airspace disease in the right mid to lower lung zone without significant change.  This is superimposed on generalized prominence of interstitial markings.  There is no pleural effusion or pneumothorax.    Impression  Right lung infiltrate without significant change.      Electronically signed by: Salvador Smith MD  Date:    01/10/2022  Time:    15:25  ]    Labs     Recent Labs   Lab 03/21/22  0428   WBC 4.22   HGB 8.0*   HCT 26.5*        Recent Labs   Lab 03/21/22 0428      K 3.6      CO2 25   BUN 5*   CREATININE 0.4*   GLU 87   CALCIUM 9.2   AST 57*   ALT 89*   ALKPHOS 203*   BILITOT 0.8   PROT 7.3   ALBUMIN 2.4*   No results for input(s): PH, PCO2, PO2, HCO3 in the last 24 hours.  Microbiology Results (last 7 days)     Procedure Component Value Units Date/Time    Urine Culture High Risk [[id number]]  (Abnormal) Collected: 03/18/22 1010    Order Status: Completed Specimen: Urine, Catheterized Updated: 03/20/22 2108     Urine Culture, Routine GRAM NEGATIVE CHRISTINA  10,000 - 49,999 cfu/ml  Identification and susceptibility pending      Narrative:      Indicated criteria for high risk culture:->Other  Other (specify):->sepsis    Blood culture x two cultures. Draw prior to antibiotics. [[id number]] Collected: 03/17/22 0854    Order Status: Completed Specimen: Blood from Peripheral, Right Hand Updated: 03/20/22 1412     Blood Culture, Routine No Growth to date      No Growth to date      No Growth to date      No Growth to date    Narrative:      Aerobic and anaerobic    Aerobic culture [[id number]]  (Abnormal) Collected: 03/18/22 1630    Order Status: Completed Specimen: Wound from Elbow, Right Updated: 03/20/22 1307     Aerobic Bacterial  Culture GRAM NEGATIVE CHRISTINA  Moderate  Identification and susceptibility pending        STAPHYLOCOCCUS AUREUS  Many  Susceptibility pending      Blood culture x two cultures. Draw prior to antibiotics. [[id number]]  (Abnormal)  (Susceptibility) Collected: 03/17/22 0844    Order Status: Completed Specimen: Blood Updated: 03/20/22 1249     Blood Culture, Routine Gram stain aer bottle: Gram positive cocci in chains resembling Strep       Results called to and read back by: RASHEED LACEY RN  03/18/2022        08:06      ENTEROCOCCUS FAECALIS    Narrative:      Aerobic and anaerobic    Culture, Respiratory with Gram Stain [[id number]]  (Abnormal)  (Susceptibility) Collected: 03/17/22 1337    Order Status: Completed Specimen: Respiratory from Tracheal Aspirate Updated: 03/20/22 1216     Respiratory Culture SERRATIA MARCESCENS  Many        PROVIDENCIA STUARTII  Many  Normal respiratory kenny also present       Gram Stain (Respiratory) Rare WBC's     Gram Stain (Respiratory) Many Gram positive cocci     Gram Stain (Respiratory) Many Gram negative rods    Urine culture [[id number]] Collected: 03/20/22 0100    Order Status: No result Specimen: Urine Updated: 03/20/22 0117          Impression:  Active Hospital Problems    Diagnosis  POA    *Pneumonia of right lower lobe due to infectious organism [J18.9]  Yes    Leaking PEG tube [K94.23]  Yes    Pressure injury of right elbow, stage 4 [L89.014]  Yes    Cirrhosis of liver [K74.60]  Yes    Acute on chronic respiratory failure with hypoxia [J96.21]  Yes    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             .        Plan: t min 89, p 58 to 107, merrem/vanc, midodrine, lovenox 40/d,  28% ox,wbc 3.7, procal 0.04, u/a pos for blood and wbc, cxr min rll opacification-- ct abd and cxr 1/26 with  rll density from apparent atelectasis.     Urine culture positive for proteus 1/5/2022 s to merrem.  Not clear if had c diff prophylaxsis recent admits but  c diff Pos May 2021.    I do not see clear pneumonia with cxr pattern and procal.  Would monitor on merrem.       H/o prior c diff -- consider prophylaxsis for c diff?  Monitor.    3/18 tm 100.6, p 124,   I/o 1503/1550  merrem and vanc  lovenox       U/a abn,procal low and cxr stable from Jan-- would rec urine culture if not done    Pt called pneumonia but findings are minimal  -- await cultures, adjust abx, return to [place] when stable.      3/19 tm 100.04, p to 119, merrem and vanc, rm air,   gnr sputum    Will recheck later, need sensitivities to determine dc abx.  3/21no fever, vss, no fever,    providentia and serratia in sputum, pseudomonas in urine <50k, elbow wound with gnr/staph with sensitivities pending.    merrem adequate for providentia and serratia-- pseudomonas  and not significant at this time.  Continue vanc for now for elbow wound.    Today day 5 merrem, day 7 adequate to stop.

## 2022-03-21 NOTE — CARE UPDATE
03/21/22 0702   Patient Assessment/Suction   Level of Consciousness (AVPU) responds to pain   Respiratory Effort Normal;Unlabored   Expansion/Accessory Muscles/Retractions expansion symmetric;no retractions;no use of accessory muscles   All Lung Fields Breath Sounds rhonchi   Rhythm/Pattern, Respiratory assisted mechanically   Cough Frequency with stimulation   Suction Method oral;tracheal   $ Suction Charges Inline Suction Procedure Stat Charge   Secretions Amount large   Secretions Color yellow;pale   Secretions Characteristics thick   PRE-TX-O2   O2 Device (Oxygen Therapy) ventilator   $ Is the patient on Low Flow Oxygen? Yes   SpO2 99 %   Pulse Oximetry Type Continuous   $ Pulse Oximetry - Multiple Charge Pulse Oximetry - Multiple   Pulse 96   Resp (!) 27   ETCO2   $ ETCO2 Usage Currently wearing   ETCO2 Device Type Bedside Monitor;Ventilator;Artificial Airway   Aerosol Therapy   $ Aerosol Therapy Charges Aerosol Treatment   Daily Review of Necessity (SVN) completed   Treatment Route (SVN) in-line;ventilator   Patient Position (SVN) HOB elevated   Post Treatment Assessment (SVN) breath sounds unchanged   Signs of Intolerance (SVN) none   Breath Sounds Post-Respiratory Treatment   Throughout All Fields Post-Treatment All Fields   Throughout All Fields Post-Treatment no change   Post-treatment Heart Rate (beats/min) 3   Post-treatment Resp Rate (breaths/min) 22   Skin Integrity   $ Wound Care Tech Time 15 min   Area Observed Neck under tracheostomy   Skin Appearance without discoloration        Surgical Airway Portex Cuffed   No placement date or time found.   Present Prior to Hospital Arrival?: Yes  Brand: Portex  Airway Device Size: 8.0  Style: Cuffed   Cuff Pressure 38 cm H2O   Cuff Inflation? Inflated   Status Secured   Site Assessment Midline   Site Care Cleansed;Dried;Dressing applied   Ties Assessment Intact;Secure   General Safety Checklist   Safety Promotion/Fall Prevention side rails raised   Airway  Safety   Ambu bag with the patient? Yes, Adult Ambu   Is mask with the patient? Yes, Adult Mask   Suction set is at the bedside? Yes   Extra trach at bedside? No   Is obturator available? No   Equipment Change   $ RT Equipment HEPA filter;HME   Closed Suction System Changed? Yes   Closed Suction System Next Due 03/24/22   Airway Assistance   $ Trach Assist Charges Trach Assist;Tech time 15 min  (trach care done)   Vent Select   Conventional Vent Y   Charged w/in last 24h YES   Preset Conventional Ventilator Settings   Vent Type [id number]-[id number]   Ventilation Type VC   Humidity HME   Patient Ventilator Parameters   Mean Airway Pressure 0.9 cmH20   Tubing ID (mm) 8 mm   Tube Type Trach   Conventional Ventilator Alarms   Alarms On Y   Ve High Alarm 4 L/min   Ve Low Alarm 3 L/min   Vt High Alarm 3 mL   Vt Low Alarm 5 mL   Resp Rate High Alarm 40 br/min   Press High Alarm 55 cmH2O   Delay Alarm (Sec) 30 sec(s)   IHI Ventilator Associated Pneumonia Bundle (Required)   Head of Bed Elevated  HOB 30   Oral Care Mouth swabbed;Mouth suctioned;with half strength hydrogen peroxide

## 2022-03-21 NOTE — PLAN OF CARE
Problem: Adult Inpatient Plan of Care  Goal: Plan of Care Review  Outcome: Ongoing, Not Progressing     Problem: Infection  Goal: Absence of Infection Signs and Symptoms  Outcome: Ongoing, Not Progressing     Problem: Infection (Pneumonia)  Goal: Resolution of Infection Signs and Symptoms  Outcome: Ongoing, Not Progressing     POC reviewed with the patient's family via telephone and she verbalized understanding. All comments and concerns addressed. Patient remains trach/vent at this time. NSR with PVCs noted on monitor. Potassium replaced this shift. GI and ID consults called in this AM. Patient started on Amikin per ID. 1 set blood cultures collected per order. Artificial tears applied. ECHO performed. KUB performed. Brown bomb enema administered--1 small hard stool after enema. Patient scheduled for CT abdomen/pelvis this PM--contrast at bedside to be given prior to scan. TF remains paused until clearance from GI after CT. 2700 cc urine output via carroll this shift. Bed locked in lowest position with bed alarm set, call light within reach. Safety precautions maintained.

## 2022-03-21 NOTE — PLAN OF CARE
Intervention: enteral nutrition therapy, collaboration with care providers      Recommendation:  1) Continue TF when medically able   Isosource 1.5 @ 520 ml/hr advancing to goal of 50 ml/hr + 110 ml flush q 4 hr + beneprotein BID +Benny BID   ( provides 1800 kcal ( > 100% EEN), 81 g protein + bene ( 89% EPN), 912 ml free water)  -if not hemodynamically stable Nutren 1.5 same rate appropriate      2) If pt has trouble tolerating TF consider PPN  D 5 AA 4.25 @ 75 ml/hr + standard lipids ( 1112 kcal, 76 g protein)     3) weigh weekly, replete lytes as needed     Goals: 1) TF meeting goal at f/u  Nutrition Goal Status: meeting-continues  Communication of RD Recs: POC, sticky note, reviewed with RN

## 2022-03-21 NOTE — PROGRESS NOTES
[facility] Hospital Medicine  Progress Note    Patient Name: Genna Felix  MRN: [id number]  Patient Class: IP- Inpatient   Admission Date: 3/17/2022  Length of Stay: 3 days  Attending Physician: Lanre Goss MD  Primary Care Provider: Primary Doctor No        Subjective:     Principal Problem:Pneumonia of right lower lobe due to infectious organism        HPI:  No notes on file    Overview/Hospital Course:  No notes on file    Interval History:   Nurse last night noticed lot of drainage coming from PEG site; appeared to be feeding.  Another nurse was assigned at some time during the night.  Day nurse did not see any drainage, but she did feel left sided firmness and audible gas coming from tube site when abdomen was palpated.    Review of Systems   Unable to perform ROS: Patient unresponsive   Objective:     Vital Signs (Most Recent):  Temp: 98.78 °F (37.1 °C) (03/20/22 1916)  Pulse: 85 (03/20/22 1916)  Resp: 20 (03/20/22 1916)  BP: (!) 119/59 (03/20/22 1830)  SpO2: 97 % (03/20/22 1916)   Vital Signs (24h Range):  Temp:  [96.08 °F (35.6 °C)-98.78 °F (37.1 °C)] 98.78 °F (37.1 °C)  Pulse:  [] 85  Resp:  [4-63] 20  SpO2:  [90 %-100 %] 97 %  BP: (101-128)/(51-74) 119/59     Weight: 70.4 kg (155 lb 3.3 oz)  Body mass index is 30.31 kg/m².    Intake/Output Summary (Last 24 hours) at 3/20/2022 1934  Last data filed at 3/20/2022 1800  Gross per 24 hour   Intake 1187.55 ml   Output 2585 ml   Net -1397.45 ml        Physical Exam  Constitutional:       General: She is not in acute distress.  Eyes:      General:         Right eye: No discharge.         Left eye: No discharge.   Neck:      Vascular: No JVD.   Cardiovascular:      Rate and Rhythm: Normal rate and regular rhythm.   Pulmonary:      Breath sounds: Normal breath sounds.      Comments: Tracheostomy connected to ventilator  Abdominal:      General: Abdomen is flat. Bowel sounds are normal. There is no distension.      Palpations: Abdomen  "is soft.      Comments: Sounds like gas coming from PEG site when abdomen is palpated   Musculoskeletal:      Right lower leg: No edema.      Left lower leg: No edema.   Skin:     General: Skin is warm and moist.      Findings: No rash.   Neurological:      Mental Status: She is alert.      Comments: Not responding to questions.  Not following commands.  She has an upward gaze.       Significant Labs: All pertinent labs within the past 24 hours have been reviewed.    Significant Imaging: I have reviewed all pertinent imaging results/findings within the past 24 hours.      Assessment/Plan:      * Pneumonia of right lower lobe due to infectious organism  Was present on day 1 of admission.  Continue antibiotics.  Monitor for improvement.    Antibiotics (From admission, onward)            Start     Stop Route Frequency Ordered    03/19/22 2230  vancomycin 1.25 g in dextrose 5% 250 mL IVPB (ready to mix)         -- IV Every 18 hours 03/19/22 2214    03/17/22 2300  mupirocin 2 % ointment         03/22 2059 Nasl 2 times daily 03/17/22 2151 03/17/22 2100  vancomycin 125 mg/5 mL oral solution 125 mg         -- PER G TUBE 2 times daily 03/17/22 2008 03/17/22 1900  meropenem (MERREM) 2 g in sodium chloride 0.9% 100 mL IVPB         -- IV Every 8 hours (non-standard times) 03/17/22 1007    03/17/22 1105  vancomycin - pharmacy to dose  (vancomycin IVPB)        "And" Linked Group Details    -- IV pharmacy to manage frequency 03/17/22 1007              Leaking PEG tube  Evidence of this was discovered last night by nurse.  Will keep TF on hold.  Consult GI group for evaluation.      Pressure injury of right elbow, stage 4  Was present on hospital day 1.  Wound care nurse evaluated it and recommended methods of treatment.      Cirrhosis of liver  Chronic.  Liver functions appear stable.    Lab Results   Component Value Date    ALT 71 (H) 03/18/2022    AST 34 03/18/2022    ALKPHOS 188 (H) 03/18/2022    BILITOT 0.9 03/18/2022 "           Acute on chronic respiratory failure with hypoxia  Continue mechanical ventilator support.  Monitor oximetry readings.  Consulting with pulmonologist to help with management.    Tracheostomy dependence  Chronic.  Continue routine care.      Sepsis  Pt has sepsis due to pneumonia.  No signs of organ damage.  She's on IVAB to get source control.      Persistent vegetative state  Chronic.      Anemia of chronic disease  Patient's anemia is currently controlled. Has not received any PRBCs to date.. Etiology likely d/t chronic disease  Current CBC reviewed-   Lab Results   Component Value Date    HGB 8.2 (L) 03/18/2022    HCT 27.4 (L) 03/18/2022     Monitor serial CBC and transfuse if patient becomes hemodynamically unstable, symptomatic or H/H drops below 7/21.           VTE Risk Mitigation (From admission, onward)         Ordered     enoxaparin injection 40 mg  Daily         03/17/22 1013     IP VTE HIGH RISK PATIENT  Once         03/17/22 1013     Place sequential compression device  Until discontinued         03/17/22 1013                Discharge Planning   [place]: 3/21/2022     Code Status: Full Code   Is the patient medically ready for discharge?:     Reason for patient still in hospital (select all that apply): Patient trending condition, Treatment and Consult recommendations  Discharge Plan A: Return to nursing home              Lanre Goss MD  Department of Hospital Medicine   [facility]

## 2022-03-21 NOTE — ASSESSMENT & PLAN NOTE
"Was present on day 1 of admission.  Continue antibiotics.  Monitor for improvement.    Antibiotics (From admission, onward)            Start     Stop Route Frequency Ordered    03/19/22 2230  vancomycin 1.25 g in dextrose 5% 250 mL IVPB (ready to mix)         -- IV Every 18 hours 03/19/22 2214 03/17/22 2300  mupirocin 2 % ointment         03/22 2059 Nasl 2 times daily 03/17/22 2151 03/17/22 2100  vancomycin 125 mg/5 mL oral solution 125 mg         -- PER G TUBE 2 times daily 03/17/22 2008 03/17/22 1900  meropenem (MERREM) 2 g in sodium chloride 0.9% 100 mL IVPB         -- IV Every 8 hours (non-standard times) 03/17/22 1007    03/17/22 1105  vancomycin - pharmacy to dose  (vancomycin IVPB)        "And" Linked Group Details    -- IV pharmacy to manage frequency 03/17/22 1007            "

## 2022-03-22 LAB
ALBUMIN SERPL BCP-MCNC: 2.4 G/DL (ref 3.5–5.2)
ALP SERPL-CCNC: 211 U/L (ref 55–135)
ALT SERPL W/O P-5'-P-CCNC: 82 U/L (ref 10–44)
ANION GAP SERPL CALC-SCNC: 8 MMOL/L (ref 8–16)
ANISOCYTOSIS BLD QL SMEAR: SLIGHT
AST SERPL-CCNC: 49 U/L (ref 10–40)
BACTERIA BLD CULT: NORMAL
BACTERIA UR CULT: ABNORMAL
BASOPHILS # BLD AUTO: ABNORMAL K/UL (ref 0–0.2)
BASOPHILS NFR BLD: 0 % (ref 0–1.9)
BILIRUB SERPL-MCNC: 0.7 MG/DL (ref 0.1–1)
BUN SERPL-MCNC: 3 MG/DL (ref 6–20)
CALCIUM SERPL-MCNC: 9.3 MG/DL (ref 8.7–10.5)
CHLORIDE SERPL-SCNC: 102 MMOL/L (ref 95–110)
CO2 SERPL-SCNC: 28 MMOL/L (ref 23–29)
CREAT SERPL-MCNC: 0.5 MG/DL (ref 0.5–1.4)
DIFFERENTIAL METHOD: ABNORMAL
EOSINOPHIL # BLD AUTO: ABNORMAL K/UL (ref 0–0.5)
EOSINOPHIL NFR BLD: 3 % (ref 0–8)
ERYTHROCYTE [DISTWIDTH] IN BLOOD BY AUTOMATED COUNT: 16.5 % (ref 11.5–14.5)
EST. GFR  (AFRICAN AMERICAN): >60 ML/MIN/1.73 M^2
EST. GFR  (NON AFRICAN AMERICAN): >60 ML/MIN/1.73 M^2
GLUCOSE SERPL-MCNC: 89 MG/DL (ref 70–110)
HCT VFR BLD AUTO: 25.6 % (ref 37–48.5)
HGB BLD-MCNC: 7.8 G/DL (ref 12–16)
IMM GRANULOCYTES # BLD AUTO: ABNORMAL K/UL (ref 0–0.04)
IMM GRANULOCYTES NFR BLD AUTO: ABNORMAL % (ref 0–0.5)
LYMPHOCYTES # BLD AUTO: ABNORMAL K/UL (ref 1–4.8)
LYMPHOCYTES NFR BLD: 37 % (ref 18–48)
MAGNESIUM SERPL-MCNC: 1.7 MG/DL (ref 1.6–2.6)
MCH RBC QN AUTO: 25.9 PG (ref 27–31)
MCHC RBC AUTO-ENTMCNC: 30.5 G/DL (ref 32–36)
MCV RBC AUTO: 85 FL (ref 82–98)
MONOCYTES # BLD AUTO: ABNORMAL K/UL (ref 0.3–1)
MONOCYTES NFR BLD: 6 % (ref 4–15)
NEUTROPHILS NFR BLD: 53 % (ref 38–73)
NEUTS BAND NFR BLD MANUAL: 1 %
NRBC BLD-RTO: 0 /100 WBC
PHOSPHATE SERPL-MCNC: 2.1 MG/DL (ref 2.7–4.5)
PLATELET # BLD AUTO: 310 K/UL (ref 150–450)
PLATELET BLD QL SMEAR: ABNORMAL
PMV BLD AUTO: 10.3 FL (ref 9.2–12.9)
POCT GLUCOSE: 108 MG/DL (ref 70–110)
POCT GLUCOSE: 113 MG/DL (ref 70–110)
POCT GLUCOSE: 115 MG/DL (ref 70–110)
POCT GLUCOSE: 115 MG/DL (ref 70–110)
POCT GLUCOSE: 118 MG/DL (ref 70–110)
POCT GLUCOSE: 98 MG/DL (ref 70–110)
POTASSIUM SERPL-SCNC: 3.7 MMOL/L (ref 3.5–5.1)
PROT SERPL-MCNC: 7.2 G/DL (ref 6–8.4)
RBC # BLD AUTO: 3.01 M/UL (ref 4–5.4)
SODIUM SERPL-SCNC: 138 MMOL/L (ref 136–145)
VANCOMYCIN TROUGH SERPL-MCNC: 17.1 UG/ML (ref 10–22)
WBC # BLD AUTO: 3.69 K/UL (ref 3.9–12.7)

## 2022-03-22 PROCEDURE — 99233 PR SUBSEQUENT HOSPITAL CARE,LEVL III: ICD-10-PCS | Mod: S$GLB,,, | Performed by: STUDENT IN AN ORGANIZED HEALTH CARE EDUCATION/TRAINING PROGRAM

## 2022-03-22 PROCEDURE — 27000207 HC ISOLATION

## 2022-03-22 PROCEDURE — 25000242 PHARM REV CODE 250 ALT 637 W/ HCPCS: Performed by: INTERNAL MEDICINE

## 2022-03-22 PROCEDURE — 99233 SBSQ HOSP IP/OBS HIGH 50: CPT | Mod: S$GLB,,, | Performed by: STUDENT IN AN ORGANIZED HEALTH CARE EDUCATION/TRAINING PROGRAM

## 2022-03-22 PROCEDURE — 63600175 PHARM REV CODE 636 W HCPCS: Performed by: STUDENT IN AN ORGANIZED HEALTH CARE EDUCATION/TRAINING PROGRAM

## 2022-03-22 PROCEDURE — 94640 AIRWAY INHALATION TREATMENT: CPT

## 2022-03-22 PROCEDURE — 99223 1ST HOSP IP/OBS HIGH 75: CPT | Mod: ,,, | Performed by: SURGERY

## 2022-03-22 PROCEDURE — 63600175 PHARM REV CODE 636 W HCPCS: Performed by: INTERNAL MEDICINE

## 2022-03-22 PROCEDURE — 20000000 HC ICU ROOM

## 2022-03-22 PROCEDURE — 85027 COMPLETE CBC AUTOMATED: CPT | Performed by: INTERNAL MEDICINE

## 2022-03-22 PROCEDURE — 36415 COLL VENOUS BLD VENIPUNCTURE: CPT | Performed by: INTERNAL MEDICINE

## 2022-03-22 PROCEDURE — 99291 CRITICAL CARE FIRST HOUR: CPT | Mod: ,,, | Performed by: INTERNAL MEDICINE

## 2022-03-22 PROCEDURE — 27200966 HC CLOSED SUCTION SYSTEM

## 2022-03-22 PROCEDURE — 99223 PR INITIAL HOSPITAL CARE,LEVL III: ICD-10-PCS | Mod: ,,, | Performed by: SURGERY

## 2022-03-22 PROCEDURE — 25000003 PHARM REV CODE 250: Performed by: NURSE PRACTITIONER

## 2022-03-22 PROCEDURE — 99900035 HC TECH TIME PER 15 MIN (STAT)

## 2022-03-22 PROCEDURE — 99900026 HC AIRWAY MAINTENANCE (STAT)

## 2022-03-22 PROCEDURE — 99291 PR CRITICAL CARE, E/M 30-74 MINUTES: ICD-10-PCS | Mod: ,,, | Performed by: INTERNAL MEDICINE

## 2022-03-22 PROCEDURE — 85007 BL SMEAR W/DIFF WBC COUNT: CPT | Performed by: INTERNAL MEDICINE

## 2022-03-22 PROCEDURE — 80053 COMPREHEN METABOLIC PANEL: CPT | Performed by: INTERNAL MEDICINE

## 2022-03-22 PROCEDURE — 63600175 PHARM REV CODE 636 W HCPCS: Performed by: HOSPITALIST

## 2022-03-22 PROCEDURE — 94761 N-INVAS EAR/PLS OXIMETRY MLT: CPT

## 2022-03-22 PROCEDURE — 99900022

## 2022-03-22 PROCEDURE — 83735 ASSAY OF MAGNESIUM: CPT | Performed by: INTERNAL MEDICINE

## 2022-03-22 PROCEDURE — 27000221 HC OXYGEN, UP TO 24 HOURS

## 2022-03-22 PROCEDURE — 25000003 PHARM REV CODE 250: Performed by: HOSPITALIST

## 2022-03-22 PROCEDURE — 25000003 PHARM REV CODE 250: Performed by: INTERNAL MEDICINE

## 2022-03-22 PROCEDURE — A4216 STERILE WATER/SALINE, 10 ML: HCPCS | Performed by: INTERNAL MEDICINE

## 2022-03-22 PROCEDURE — 84100 ASSAY OF PHOSPHORUS: CPT | Performed by: INTERNAL MEDICINE

## 2022-03-22 PROCEDURE — 80202 ASSAY OF VANCOMYCIN: CPT | Performed by: INTERNAL MEDICINE

## 2022-03-22 PROCEDURE — 94003 VENT MGMT INPAT SUBQ DAY: CPT

## 2022-03-22 RX ORDER — MAGNESIUM SULFATE HEPTAHYDRATE 40 MG/ML
2 INJECTION, SOLUTION INTRAVENOUS ONCE
Status: COMPLETED | OUTPATIENT
Start: 2022-03-22 | End: 2022-03-22

## 2022-03-22 RX ORDER — FERROUS SULFATE 300 MG/5ML
300 LIQUID (ML) ORAL DAILY
Status: DISCONTINUED | OUTPATIENT
Start: 2022-03-22 | End: 2022-03-26 | Stop reason: HOSPADM

## 2022-03-22 RX ORDER — AMOXICILLIN 250 MG
1 CAPSULE ORAL DAILY
Status: DISCONTINUED | OUTPATIENT
Start: 2022-03-22 | End: 2022-03-26 | Stop reason: HOSPADM

## 2022-03-22 RX ORDER — POTASSIUM CHLORIDE 14.9 MG/ML
20 INJECTION INTRAVENOUS ONCE
Status: COMPLETED | OUTPATIENT
Start: 2022-03-22 | End: 2022-03-22

## 2022-03-22 RX ORDER — MAGNESIUM SULFATE 500 MG/ML
2 INJECTION, SOLUTION INTRAMUSCULAR; INTRAVENOUS ONCE
Status: DISCONTINUED | OUTPATIENT
Start: 2022-03-22 | End: 2022-03-22 | Stop reason: SDUPTHER

## 2022-03-22 RX ORDER — POTASSIUM CHLORIDE 7.45 MG/ML
10 INJECTION INTRAVENOUS
Status: DISCONTINUED | OUTPATIENT
Start: 2022-03-22 | End: 2022-03-22 | Stop reason: SDUPTHER

## 2022-03-22 RX ADMIN — HYPROMELLOSE 2910 1 DROP: 5 SOLUTION OPHTHALMIC at 09:03

## 2022-03-22 RX ADMIN — VANCOMYCIN HYDROCHLORIDE 1250 MG: 1.25 INJECTION, POWDER, LYOPHILIZED, FOR SOLUTION INTRAVENOUS at 10:03

## 2022-03-22 RX ADMIN — IPRATROPIUM BROMIDE AND ALBUTEROL SULFATE 3 ML: 2.5; .5 SOLUTION RESPIRATORY (INHALATION) at 12:03

## 2022-03-22 RX ADMIN — SODIUM CHLORIDE, PRESERVATIVE FREE 10 ML: 5 INJECTION INTRAVENOUS at 12:03

## 2022-03-22 RX ADMIN — MINERAL SUPPLEMENT IRON 300 MG / 5 ML STRENGTH LIQUID 100 PER BOX UNFLAVORED 300 MG: at 01:03

## 2022-03-22 RX ADMIN — DEXTROSE AND SODIUM CHLORIDE: 5; .9 INJECTION, SOLUTION INTRAVENOUS at 05:03

## 2022-03-22 RX ADMIN — HYPROMELLOSE 2910 1 DROP: 5 SOLUTION OPHTHALMIC at 08:03

## 2022-03-22 RX ADMIN — Medication 125 MG: at 12:03

## 2022-03-22 RX ADMIN — IPRATROPIUM BROMIDE AND ALBUTEROL SULFATE 3 ML: 2.5; .5 SOLUTION RESPIRATORY (INHALATION) at 07:03

## 2022-03-22 RX ADMIN — HYPROMELLOSE 2910 1 DROP: 5 SOLUTION OPHTHALMIC at 01:03

## 2022-03-22 RX ADMIN — HYPROMELLOSE 2910 1 DROP: 5 SOLUTION OPHTHALMIC at 05:03

## 2022-03-22 RX ADMIN — POTASSIUM CHLORIDE 20 MEQ: 14.9 INJECTION, SOLUTION INTRAVENOUS at 11:03

## 2022-03-22 RX ADMIN — MIDODRINE HYDROCHLORIDE 10 MG: 5 TABLET ORAL at 12:03

## 2022-03-22 RX ADMIN — ENOXAPARIN SODIUM 40 MG: 40 INJECTION SUBCUTANEOUS at 05:03

## 2022-03-22 RX ADMIN — MIDODRINE HYDROCHLORIDE 10 MG: 5 TABLET ORAL at 09:03

## 2022-03-22 RX ADMIN — MUPIROCIN: 20 OINTMENT TOPICAL at 09:03

## 2022-03-22 RX ADMIN — MEROPENEM AND SODIUM CHLORIDE 1 G: 1 INJECTION, SOLUTION INTRAVENOUS at 05:03

## 2022-03-22 RX ADMIN — SODIUM CHLORIDE, PRESERVATIVE FREE 10 ML: 5 INJECTION INTRAVENOUS at 02:03

## 2022-03-22 RX ADMIN — ACETYLCYSTEINE 4 ML: 100 INHALANT RESPIRATORY (INHALATION) at 07:03

## 2022-03-22 RX ADMIN — COLLAGENASE SANTYL: 250 OINTMENT TOPICAL at 09:03

## 2022-03-22 RX ADMIN — SODIUM CHLORIDE, PRESERVATIVE FREE 10 ML: 5 INJECTION INTRAVENOUS at 05:03

## 2022-03-22 RX ADMIN — MAGNESIUM SULFATE 2 G: 2 INJECTION INTRAVENOUS at 09:03

## 2022-03-22 RX ADMIN — MEROPENEM AND SODIUM CHLORIDE 1 G: 1 INJECTION, SOLUTION INTRAVENOUS at 09:03

## 2022-03-22 RX ADMIN — SODIUM PHOSPHATE, MONOBASIC, MONOHYDRATE 20.01 MMOL: 276; 142 INJECTION, SOLUTION INTRAVENOUS at 08:03

## 2022-03-22 RX ADMIN — VANCOMYCIN HYDROCHLORIDE 1250 MG: 1.25 INJECTION, POWDER, LYOPHILIZED, FOR SOLUTION INTRAVENOUS at 05:03

## 2022-03-22 RX ADMIN — Medication 125 MG: at 09:03

## 2022-03-22 RX ADMIN — MIDODRINE HYDROCHLORIDE 10 MG: 5 TABLET ORAL at 03:03

## 2022-03-22 RX ADMIN — ACETYLCYSTEINE 4 ML: 100 INHALANT RESPIRATORY (INHALATION) at 12:03

## 2022-03-22 RX ADMIN — MIDODRINE HYDROCHLORIDE 10 MG: 5 TABLET ORAL at 08:03

## 2022-03-22 RX ADMIN — MEROPENEM AND SODIUM CHLORIDE 1 G: 1 INJECTION, SOLUTION INTRAVENOUS at 01:03

## 2022-03-22 NOTE — NURSING
Notified Graham CHEN of patient having frequent PVCs on monitor and morning labs resulting of potassium 3.7 and magnesium of 1.7. MD ordered potassium and magnesium replacements.

## 2022-03-22 NOTE — NURSING
2040 - 500ml contrast given per PEG tube for CT scan. Patient received full 500ml, however vomited what appeared to look like the constrast after administration. PRN zofran given.    2120 - 500ml contrast given per PEG tube. Patient tolerated approx 400ml of contrast before having another episode of vomiting. CT aware.

## 2022-03-22 NOTE — PROGRESS NOTES
Consult Note  Infectious Disease    Reason for Consult: VAP/Enterococcus bacteremia/ Pseudomonas UTI    HPI: Genna Felix is a 61 y.o. female known to our service for prior hospital admissions last one 1/12/22 for acute cholecystitis s/p IR drainage 1/13, resident of [facility], with past medical history of anoxic brain injury status post tracheostomy/peg tube, prior PE, hypertension, and GERD, send from  for hypothermia. History very limited; patient is non-verbal at baseline, no family present.     In the R, patient with BP 96/55, tachycardic 107, temp 94.2  Labs significant for leukopenia 3.7, PMNL 72.3%, l: 16.9%  Cr 0.5, patient with muscle wasting   Albumin 3.1 , AST 54/ALT 98  Procal 0.04, negative   UA with 3+ protein, 1+ leukocyte esterase, RBC 50, WBC 5 - urine culture grew  Pseudomonas aeruginosa   CXR: RLL infiltrate     Admitted for sever sepsis multifactorial; VAP/UTi/R elbow decubitus ulcer.    Empirically started on Vancomycin, Meropenem, and PO vancomycin.     Hospital course complicated by Enterococcus faecalis bacteremia.   ID consult for antibiotic recommendations.     INTERVAL HISTORY:  3/22/22: Interim reviewed, patient seen and examined at bedside. Labile BP, hypothermia 95.7 overnight, currently normal temp. Micro reviewed, repeat blood cultures 3/21 x 1 no growth. Repeat urine culture no growth, s/p amikacin.  Wound cultures grew CRP Acinetobacter baumannii, MRSA and Proteus mirabils sensitive to meropenem.  CT scan abdomen/pelvis revealed bibasilar airspace disease. Multiple gallstones with cholecystostomy in place, gallbladder decompressed. Cystitis and diffuse rectal wall thickening s/o proctitis.     Review of patient's allergies indicates:  No Known Allergies  Past Medical History:   Diagnosis Date    Anoxic brain damage 07/2020    Bedbound 07/2020    Cardiac arrest 07/2020    Cirrhosis     GERD (gastroesophageal reflux disease)     Hemangioma of  intra-abdominal structure     Hypertension     Nursing home resident     Protein calorie malnutrition     Pulmonary embolus     Respiratory distress     S/P percutaneous endoscopic gastrostomy (PEG) tube placement 07/2020    Total self-care deficit 07/2020    Tracheostomy dependence     7/2020     Past Surgical History:   Procedure Laterality Date    PEG W/TRACHEOSTOMY PLACEMENT  07/27/2020    SKIN GRAFT      buttocks     Social History     Tobacco Use    Smoking status: Never Smoker    Smokeless tobacco: Never Used   Substance Use Topics    Alcohol use: Not Currently        Family History   Problem Relation Age of Onset    No Known Problems Mother     No Known Problems Father        Pertinent medications noted:     Review of Systems:   Unable to obtain, patient is non-verbal, no family at bedside.     EXAM & DIAGNOSTICS REVIEWED:   Vitals:     Temp:  [95.18 °F (35.1 °C)-99.32 °F (37.4 °C)]   Temp: 97.52 °F (36.4 °C) (03/22/22 1500)  Pulse: 89 (03/22/22 1500)  Resp: 14 (03/22/22 1500)  BP: (!) 103/58 (03/22/22 1500)  SpO2: 97 % (03/22/22 1500)    Intake/Output Summary (Last 24 hours) at 3/22/2022 1545  Last data filed at 3/22/2022 1330  Gross per 24 hour   Intake 6145.16 ml   Output 3804 ml   Net 2341.16 ml     General:         Chronically ill-appearing, trach to vent FiO2 21%, in NAD.      Eyes:               Hyperemic conjunctiva both eyes, anicteric  ENT:                Very dry lower lip mucosa, desquamating skin noted, no oral thush Neck:              Supple  Lungs:            R base rhonchi, L mostly clear  Thick purulent secretions noted   Heart:              S1/S2+, regular rhythm, no murmurs  Abd:                RUQ CLARICE drain in place since 1/13/22   bilious fluid                          PEG tube in place, no evidence of redness or purulent drainage from ostomy, +BS, soft, non tender, moderately distended  :                  Hamilton, cloudy urine   Musc:              Flexure contractures on  hands  Skin:               Warm, no rash   Wound:             Neuro:  Not following commands  Psych:            unable to perform   Lymphatic:      Extrem:           No LE edema b/l - contracted  VAD:                Peripheral IV      PICC line R 3/17                                                        Isolation:  Contact    3/18:      Right elbow     Left foot     1/4/22:       Sacrum                        Right hand    General Labs reviewed:  Recent Labs   Lab 03/18/22  0624 03/21/22  0428 03/22/22  0340   WBC 7.06 4.22 3.69*   HGB 8.2* 8.0* 7.8*   HCT 27.4* 26.5* 25.6*    282 310       Recent Labs   Lab 03/18/22  0624 03/21/22  0428 03/22/22  0340    138 138   K 3.9 3.6 3.7    103 102   CO2 21* 25 28   BUN 16 5* 3*   CREATININE 0.5 0.4* 0.5   CALCIUM 8.6* 9.2 9.3   PROT 6.8 7.3 7.2   BILITOT 0.9 0.8 0.7   ALKPHOS 188* 203* 211*   ALT 71* 89* 82*   AST 34 57* 49*     No results for input(s): CRP in the last 168 hours.  No results for input(s): SEDRATE in the last 168 hours.    Estimated Creatinine Clearance: 105.2 mL/min (based on SCr of 0.5 mg/dL).     Micro:  Microbiology Results (last 7 days)       Procedure Component Value Units Date/Time    Blood culture x two cultures. Draw prior to antibiotics. [[id number]] Collected: 03/17/22 0854    Order Status: Completed Specimen: Blood from Peripheral, Right Hand Updated: 03/22/22 1412     Blood Culture, Routine No growth after 5 days.    Narrative:      Aerobic and anaerobic    Aerobic culture [[id number]]  (Abnormal)  (Susceptibility) Collected: 03/18/22 1630    Order Status: Completed Specimen: Wound from Elbow, Right Updated: 03/22/22 1103     Aerobic Bacterial Culture ACINETOBACTER BAUMANNII   Moderate        METHICILLIN RESISTANT STAPHYLOCOCCUS AUREUS  Many        PROTEUS MIRABILIS  Few      Urine Culture High Risk [[id number]]  (Abnormal)  (Susceptibility) Collected: 03/18/22 1010    Order Status: Completed Specimen: Urine, Catheterized  Updated: 03/22/22 0923     Urine Culture, Routine PSEUDOMONAS AERUGINOSA   10,000 - 49,999 cfu/ml      Narrative:      Indicated criteria for high risk culture:->Other  Other (specify):->sepsis    Blood culture [[id number]] Collected: 03/21/22 1428    Order Status: Completed Specimen: Blood Updated: 03/22/22 0515     Blood Culture, Routine No Growth to date    Urine culture [[id number]] Collected: 03/20/22 0100    Order Status: Completed Specimen: Urine Updated: 03/21/22 1152     Urine Culture, Routine No growth    Narrative:      Specimen Source->Urine    Culture, Respiratory with Gram Stain [[id number]]  (Abnormal)  (Susceptibility) Collected: 03/17/22 1337    Order Status: Completed Specimen: Respiratory from Tracheal Aspirate Updated: 03/21/22 0912     Respiratory Culture No S aureus or Pseudomonas isolated.      SERRATIA MARCESCENS  Many        PROVIDENCIA STUARTII  Many  Normal respiratory kenny also present       Gram Stain (Respiratory) Rare WBC's     Gram Stain (Respiratory) Many Gram positive cocci     Gram Stain (Respiratory) Many Gram negative rods    Blood culture x two cultures. Draw prior to antibiotics. [[id number]]  (Abnormal)  (Susceptibility) Collected: 03/17/22 0844    Order Status: Completed Specimen: Blood Updated: 03/20/22 1249     Blood Culture, Routine Gram stain aer bottle: Gram positive cocci in chains resembling Strep       Results called to and read back by: RASHEED LACEY RN  03/18/2022        08:06      ENTEROCOCCUS FAECALIS    Narrative:      Aerobic and anaerobic            Imaging Reviewed:  CXR: Right lower lobe pneumonia.  Tracheostomy tube in place.  Abdominal US: Cholelithiasis in a contracted gallbladder.  Mild hydronephrosis of the right kidney.    KUB: Appropriately positioned PEG.     CT scan abdomen/pelvis 3/21:  1. Bibasilar airspace disease which could reflect atelectasis aspirate or pneumonia.  Trace bilateral pleural effusions.   2.  Multiple gallstones with  cholecystostomy.  Gallbladder is decompressed.   3. Urinary bladder wall thickening could relate to inadequate distension.  Cystitis or outlet obstruction could also have this appearance.   4. Diffuse rectal wall thickening is presumably from under distension although proctitis possible in the appropriate clinical setting.     Cardiology: 3/21/22  · The left ventricle is normal in size with  · The estimated ejection fraction is 65%.  · Normal left ventricular diastolic function.  · Normal right ventricular size with normal right ventricular systolic function.  · There is mild pulmonary hypertension.  · Normal central venous pressure (3 mmHg).  · The estimated PA systolic pressure is 48 mmHg.  · On long-axis view there appears to be 1 cm echo artifact on the anterior leaf of the mitral valve left atrial side that is not seen on any other view-this probably represents mirror artifact  · Mild tricuspid regurgitation.      IMPRESSION & PLAN     1. Sepsis multifactorial; VAP/UTi/R elbow decubitus ulcer s/p Amikacin one dose 3/21   Procal 0.04   Blood cultures 3/17 Enterococcus faecalis - likely source gallbladder, Surgery recs appreciated    Urine culture 3/18 CRP Pseudomonas sensitive to Amikacin - repeat urine culture no growth   Respiratory culture 3/17 Serratia marcescens and Providencia stuartii   Wound cultures R elbow 3/18  Acinetobacter baumannii, MRSA, Proteus mirabilis sensitive to Meropenem.      2.  PMHx cardiac arrest, anoxic brain injury, bed-bound, cirrhosis, GERD, nursing home resident, PE, tracheostomy and PEG since July 2020.    Recommendations:  BRENDAN to rule out vegetations   Continue Vancomycin IV, keep level around 15  Meropenem to 1g IV q8h    Trend CRP and procalcitonin   Artifical tears 4 times a day   Consider Pain & Palliative to discuss goals of care   Follow cultures   Aspiration precautions   Will follow    D/w nursing, Dr Stevenson      Medical Decision Making during this encounter was  [_]  Low Complexity  [_] Moderate Complexity  [xx] High Complexity

## 2022-03-22 NOTE — PROGRESS NOTES
Daily Progress Note  [facility]      Genna Felix (MRN: [id number])  Admitting Service: Hospital Medicine  Date of Admission: 3/17/2022   Primary Care Provider: Primary Doctor No    ?  Chief complaint: hypothermia   ?  History of Present Illness:         61F h/o PE, anoxic brain injury, now vent/trach-PEG feeding dependent also with ?cholecystostomy tube in place who is brought from [place] for hypothermia found to have a new pneumonia.       On the morning of admission trended found the patient with her warming blanket not functioning.  At that time she was found to be hypothermic leading to their transferring her here.    All information derived from ER team and [place] chart given patient's current cognitive state.       ?  ?  Subjective:     Unable to obtain given current cognitive state. Had a bowel movement after enema    PHYSICAL EXAM  Vitals: /74 (BP Location: Left arm, Patient Position: Lying)   Pulse 80   Temp 99.32 °F (37.4 °C) (Core Rectal)   Resp 20   Ht 5' (1.524 m)   Wt 69.2 kg (152 lb 8.9 oz)   SpO2 100%   BMI 29.79 kg/m²  Body mass index is 29.79 kg/m².    General: NAD, responds to pain   HEENT: upward gaze. Left sclera mildly erythematous   Cardiovascular: RRR  Respiratory: lungs CTAB  GI: abdomen S/NT/ND, +BS. ?cholecystostomy tube present   PEG tube present   Extremities: no edema  MSK: contractures present   Neuro/Psych: upward gaze. Contractures   Elbow: Pressure ulcer present       EKG and radiographs from the past 24h: reviewed and personally interpreted if available.      LABS    Recent Labs     03/22/22  0340   WBC 3.69*   HGB 7.8*        ?  Recent Labs     03/22/22  0340      K 3.7   CO2 28   BUN 3*   CREATININE 0.5   MG 1.7   PHOS 2.1*     ?  Recent Labs     03/22/22  0340   BILITOT 0.7   AST 49*   ALT 82*   ALKPHOS 211*   PROT 7.2     ?  No results for input(s): INR, PT, PTT in the last 72 hours.  ?    ASSESSMENT & PLAN      61F h/o PE, anoxic brain  injury, now vent/trach-PEG feeding dependent also with ?cholecystostomy tube in place who is brought from [place] for hypothermia found to have a new pneumonia in addition to enterococcus bacteremia    1. Acute bacterial pneumonia 2/2 Serratia marcenscens and Providencia stuartii  -vanc/merrem  -warming blanket   -pulm consult    2. Enterococcus bacteremia   -TTE without vegetation   -BRENDAN requested.  -Possible biliary source. Surgery consulted for consideration of cholecystectomy.        3.  UTI 2/2 pseudomonas aeruginosa  -Amikacin x1 on 3/21.    4. Pressure ulcer of right elbow.       5. Severe sepsis 2/2 pneumonia 2/2 above organisms, UTI 2/2 above organisms, 2/2 enterococcus bacteremia leading to respiratory distress, hypotension, leukopenia with hepatic dysfunction 2/2 unknown organism.  Lactate reviewed. Abx as above. Fluids as needed        6. Tube feeds to be resume. PEG in correct position by imaging. GI assistance appreciated.  -GI consulted  -IVF while tube feeds titrated to goal     7. Benzodiazepine dependence   -Given blood pressure and current cognitive state, holding home clonazepam 0.5 mg tid. Watch for withdrawal.   -no evidence of withdrawal. Still very somnolent. Continue to hold    8.  Chronic respiratory failure/ventilator dependent     9. CDiff ppx given history  -oral vanco    10 Constipation   -BM on 3/22 after enema  -scheduling doc-senna beginning 3/22    DVT ppx: lovenox       Code Status/Dispo: Full Code.   ?  Rajeev Stevenson    Date: 3/22/2022

## 2022-03-22 NOTE — CONSULTS
[facility]  General Surgery  Consult Note    Patient Name: Genna Felix  MRN: [id number]  Code Status: Full Code  Admission Date: 3/17/2022  Hospital Length of Stay: 5 days  Attending Physician: Rajeev Stevenson MD  Primary Care Provider: Primary Doctor No    Patient information was obtained from past medical records and ER records.     Inpatient consult to General Surgery  Consult performed by: Maksim Huynh MD  Consult ordered by: Rajeev Stevenson MD        Subjective:     Principal Problem: Pneumonia of right lower lobe due to infectious organism    History of Present Illness: 61-year-old female with history of anoxic brain injury.  Patient is trach and PEG dependent.  Patient presented in January of this year and was seen by Dr. Huff.  Cholecystostomy tube was performed.  She returned to the hospital 2 days ago with bacteremia.  I was called to assess the cholecystostomy tube.  She is having bile drainage from the cholecystostomy tube.  No signs of abdominal pain.  No other significant changes in overall medical condition in the last few months.  Please refer to old records.      No current facility-administered medications on file prior to encounter.     Current Outpatient Medications on File Prior to Encounter   Medication Sig    acetaminophen (TYLENOL) 325 MG tablet 2 tablets (650 mg total) by Per G Tube route every 4 (four) hours as needed for Pain or Temperature greater than (100.4F).    acetylcysteine 100 mg/ml, 10%, (MUCOMYST) 100 mg/mL (10 %) nebulizer solution Take 4 mLs by nebulization every 8 (eight) hours.    albuterol-ipratropium (DUO-NEB) 2.5 mg-0.5 mg/3 mL nebulizer solution Take 3 mLs by nebulization every 6 (six) hours. Rescue    artificial tears (ISOPTO TEARS) 0.5 % ophthalmic solution Place 1 drop into both eyes as needed.    ergocalciferol (ERGOCALCIFEROL) 50,000 unit Cap Take 50,000 Units by mouth every 7 days.    ferrous sulfate 300 mg (60 mg iron)/5 mL syrup  Take 300 mg by mouth As instructed. Every other day    meropenem-0.9% sodium chloride 500 mg/50 mL PgBk Inject 50 mLs (500 mg total) into the vein every 8 (eight) hours.    midodrine (PROAMATINE) 10 MG tablet 1 tablet (10 mg total) by Per G Tube route 3 (three) times daily.    polyethylene glycol (GLYCOLAX) 17 gram PwPk Take by mouth daily as needed.    scopolamine (TRANSDERM-SCOP) 1.3-1.5 mg (1 mg over 3 days) Place 1 patch onto the skin Every 3 (three) days.    [DISCONTINUED] clonazePAM (KLONOPIN) 0.5 MG tablet 1 tablet (0.5 mg total) by Per G Tube route 3 (three) times daily.       Review of patient's allergies indicates:  No Known Allergies    Past Medical History:   Diagnosis Date    Anoxic brain damage 07/2020    Bedbound 07/2020    Cardiac arrest 07/2020    Cirrhosis     GERD (gastroesophageal reflux disease)     Hemangioma of intra-abdominal structure     Hypertension     Nursing home resident     Protein calorie malnutrition     Pulmonary embolus     Respiratory distress     S/P percutaneous endoscopic gastrostomy (PEG) tube placement 07/2020    Total self-care deficit 07/2020    Tracheostomy dependence     7/2020     Past Surgical History:   Procedure Laterality Date    PEG W/TRACHEOSTOMY PLACEMENT  07/27/2020    SKIN GRAFT      buttocks     Family History       Problem Relation (Age of Onset)    No Known Problems Mother, Father          Tobacco Use    Smoking status: Never Smoker    Smokeless tobacco: Never Used   Substance and Sexual Activity    Alcohol use: Not Currently    Drug use: Not Currently    Sexual activity: Not Currently     Review of Systems   Unable to perform ROS: Patient unresponsive   Objective:     Vital Signs (Most Recent):  Temp: 97.16 °F (36.2 °C) (03/22/22 1632)  Pulse: 76 (03/22/22 1632)  Resp: (!) 23 (03/22/22 1632)  BP: 103/61 (03/22/22 1600)  SpO2: 100 % (03/22/22 1632)   Vital Signs (24h Range):  Temp:  [95.18 °F (35.1 °C)-99.32 °F (37.4 °C)] 97.16 °F  (36.2 °C)  Pulse:  [] 76  Resp:  [14-67] 23  SpO2:  [87 %-100 %] 100 %  BP: (103-149)/(55-86) 103/61     Weight: 69.2 kg (152 lb 8.9 oz)  Body mass index is 29.79 kg/m².    Physical Exam  Vitals reviewed.   Cardiovascular:      Rate and Rhythm: Normal rate.   Abdominal:      General: There is no distension.      Tenderness: There is no abdominal tenderness. There is no guarding.      Comments: PEG tube in place  Cholecystostomy tube in place         Significant Labs:  I have reviewed all pertinent lab results within the past 24 hours.  CBC:   Recent Labs   Lab 03/22/22  0340   WBC 3.69*   RBC 3.01*   HGB 7.8*   HCT 25.6*      MCV 85   MCH 25.9*   MCHC 30.5*     BMP:   Recent Labs   Lab 03/22/22  0340   GLU 89      K 3.7      CO2 28   BUN 3*   CREATININE 0.5   CALCIUM 9.3   MG 1.7     CMP:   Recent Labs   Lab 03/22/22  0340   GLU 89   CALCIUM 9.3   ALBUMIN 2.4*   PROT 7.2      K 3.7   CO2 28      BUN 3*   CREATININE 0.5   ALKPHOS 211*   ALT 82*   AST 49*   BILITOT 0.7       Significant Diagnostics:  I have reviewed all pertinent imaging results/findings within the past 24 hours.  CT: I have reviewed all pertinent results/findings within the past 24 hours and my personal findings are:  Cholelithiasis noted.  No undrained fluid collections.  Cholecystostomy tube present within the gallbladder.      Assessment/Plan:     Persistent vegetative state  In regards to the cholecystostomy tube would recommend continue will drainage.  Tube is in place within the gallbladder and seems to be draining the gallbladder appropriately.  I would recommend leaving in place indefinitely.  If it was determined that it was necessary to remove could perform IR cholangiogram to assess for patency of the cystic duct.  If patent could then removed the the tube.  In her current state I do not feel that that is absolutely indicated and would recommend chronic drainage from the cholecystostomy tube.      VTE  Risk Mitigation (From admission, onward)         Ordered     enoxaparin injection 40 mg  Daily         03/17/22 1013     IP VTE HIGH RISK PATIENT  Once         03/17/22 1013     Place sequential compression device  Until discontinued         03/17/22 1013                Thank you for your consult. I will sign off. Please contact us if you have any additional questions.    Maksim Huynh MD  General Surgery  [facility]

## 2022-03-22 NOTE — PLAN OF CARE
POC reviewed. Patient nonverbal. Trach, vent dependent. KUB with contrast done. CT with contrast done. x2 episode of emesis while administering gastrografin - tube feeds held overnight. Blood glucose monitored. x3 BM this shift. PEG tube patent. Safety and isolation precautions in place.

## 2022-03-22 NOTE — CARE UPDATE
03/22/22 0702   Patient Assessment/Suction   Level of Consciousness (AVPU) responds to pain   Respiratory Effort Normal;Unlabored   Expansion/Accessory Muscles/Retractions expansion symmetric;no retractions;no use of accessory muscles   All Lung Fields Breath Sounds rhonchi   Rhythm/Pattern, Respiratory assisted mechanically   Cough Frequency with stimulation   Cough Type assisted   Suction Method oral;tracheal   $ Suction Charges Inline Suction Procedure Stat Charge   Secretions Amount moderate   Secretions Color yellow;pale   Secretions Characteristics thick   PRE-TX-O2   O2 Device (Oxygen Therapy) ventilator   $ Is the patient on Low Flow Oxygen? Yes   SpO2 97 %   Pulse Oximetry Type Continuous   $ Pulse Oximetry - Multiple Charge Pulse Oximetry - Multiple   Pulse 79   Resp (!) 22   ETCO2   $ ETCO2 Usage Currently wearing   ETCO2 Device Type Bedside Monitor;Ventilator;Artificial Airway   Aerosol Therapy   $ Aerosol Therapy Charges Aerosol Treatment   Daily Review of Necessity (SVN) completed   Respiratory Treatment Status (SVN) given   Treatment Route (SVN) in-line;ventilator   Patient Position (SVN) HOB elevated   Post Treatment Assessment (SVN) breath sounds unchanged   Signs of Intolerance (SVN) none   Breath Sounds Post-Respiratory Treatment   Throughout All Fields Post-Treatment All Fields   Throughout All Fields Post-Treatment no change   Post-treatment Heart Rate (beats/min) 80   Post-treatment Resp Rate (breaths/min) 20   Skin Integrity   $ Wound Care Tech Time 15 min   Area Observed Neck under tracheostomy   Skin Appearance without discoloration        Surgical Airway Portex Cuffed   No placement date or time found.   Present Prior to Hospital Arrival?: Yes  Brand: Portex  Airway Device Size: 8.0  Style: Cuffed   Cuff Pressure 39 cm H2O   Cuff Inflation? Inflated   Status Secured   Site Assessment Drainage;Midline   Site Care Cleansed;Dried;Dressing applied   Ties Assessment Intact;Secure   General  Safety Checklist   Safety Promotion/Fall Prevention side rails raised   Airway Safety   Ambu bag with the patient? Yes, Adult Ambu   Is mask with the patient? Yes, Adult Mask   Suction set is at the bedside? Yes   Extra trach at bedside? No   Is obturator available? No   Airway Assistance   $ Trach Assist Charges Trach Assist;Tech time 15 min  (trach care done)   Vent Select   Conventional Vent Y   $ Ventilator Subsequent 1   Charged w/in last 24h YES   Preset Conventional Ventilator Settings   Vent Type [id number]-[id number]   Ventilation Type VC   Humidity HME   Patient Ventilator Parameters   Mean Airway Pressure 11.7 cmH20   Conventional Ventilator Alarms   Alarms On Y   Ve High Alarm 14 L/min   Ve Low Alarm 3 L/min   Vt High Alarm 13 mL   Vt Low Alarm 5 mL   Resp Rate High Alarm 40 br/min   Press High Alarm 55 cmH2O   Delay Alarm (Sec) 30 sec(s)   IHI Ventilator Associated Pneumonia Bundle (Required)   Oral Care Mouth swabbed;Mouth suctioned;with half strength hydrogen peroxide

## 2022-03-22 NOTE — ASSESSMENT & PLAN NOTE
In regards to the cholecystostomy tube would recommend continue will drainage.  Tube is in place within the gallbladder and seems to be draining the gallbladder appropriately.  I would recommend leaving in place indefinitely.  If it was determined that it was necessary to remove could perform IR cholangiogram to assess for patency of the cystic duct.  If patent could then removed the the tube.  In her current state I do not feel that that is absolutely indicated and would recommend chronic drainage from the cholecystostomy tube.

## 2022-03-22 NOTE — PLAN OF CARE
Packet and current meds sent to [facility] to anticipate pts return today- awaiting dc orders.    12:52- I updated JASBIR John CM for a rapid covid test order. CM following.     03/22/22 1214   Post-Acute Status   Post-Acute Authorization Placement   Post-Acute Placement Status Referrals Sent

## 2022-03-22 NOTE — NURSING
2155 - patient transported off unit via bed to CT by RN, RT, and CT tech. Patient place on portable ventilator and portable cardiac monitoring.     2215 - patient return from CT. Placed back on vent and cardiac monitoring. No acute events during transport or in CT.

## 2022-03-22 NOTE — HPI
61-year-old female with history of anoxic brain injury.  Patient is trach and PEG dependent.  Patient presented in January of this year and was seen by Dr. Huff.  Cholecystostomy tube was performed.  She returned to the hospital 2 days ago with bacteremia.  I was called to assess the cholecystostomy tube.  She is having bile drainage from the cholecystostomy tube.  No signs of abdominal pain.  No other significant changes in overall medical condition in the last few months.  Please refer to old records.

## 2022-03-22 NOTE — SUBJECTIVE & OBJECTIVE
No current facility-administered medications on file prior to encounter.     Current Outpatient Medications on File Prior to Encounter   Medication Sig    acetaminophen (TYLENOL) 325 MG tablet 2 tablets (650 mg total) by Per G Tube route every 4 (four) hours as needed for Pain or Temperature greater than (100.4F).    acetylcysteine 100 mg/ml, 10%, (MUCOMYST) 100 mg/mL (10 %) nebulizer solution Take 4 mLs by nebulization every 8 (eight) hours.    albuterol-ipratropium (DUO-NEB) 2.5 mg-0.5 mg/3 mL nebulizer solution Take 3 mLs by nebulization every 6 (six) hours. Rescue    artificial tears (ISOPTO TEARS) 0.5 % ophthalmic solution Place 1 drop into both eyes as needed.    ergocalciferol (ERGOCALCIFEROL) 50,000 unit Cap Take 50,000 Units by mouth every 7 days.    ferrous sulfate 300 mg (60 mg iron)/5 mL syrup Take 300 mg by mouth As instructed. Every other day    meropenem-0.9% sodium chloride 500 mg/50 mL PgBk Inject 50 mLs (500 mg total) into the vein every 8 (eight) hours.    midodrine (PROAMATINE) 10 MG tablet 1 tablet (10 mg total) by Per G Tube route 3 (three) times daily.    polyethylene glycol (GLYCOLAX) 17 gram PwPk Take by mouth daily as needed.    scopolamine (TRANSDERM-SCOP) 1.3-1.5 mg (1 mg over 3 days) Place 1 patch onto the skin Every 3 (three) days.    [DISCONTINUED] clonazePAM (KLONOPIN) 0.5 MG tablet 1 tablet (0.5 mg total) by Per G Tube route 3 (three) times daily.       Review of patient's allergies indicates:  No Known Allergies    Past Medical History:   Diagnosis Date    Anoxic brain damage 07/2020    Bedbound 07/2020    Cardiac arrest 07/2020    Cirrhosis     GERD (gastroesophageal reflux disease)     Hemangioma of intra-abdominal structure     Hypertension     Nursing home resident     Protein calorie malnutrition     Pulmonary embolus     Respiratory distress     S/P percutaneous endoscopic gastrostomy (PEG) tube placement 07/2020    Total self-care deficit 07/2020    Tracheostomy dependence      7/2020     Past Surgical History:   Procedure Laterality Date    PEG W/TRACHEOSTOMY PLACEMENT  07/27/2020    SKIN GRAFT      buttocks     Family History       Problem Relation (Age of Onset)    No Known Problems Mother, Father          Tobacco Use    Smoking status: Never Smoker    Smokeless tobacco: Never Used   Substance and Sexual Activity    Alcohol use: Not Currently    Drug use: Not Currently    Sexual activity: Not Currently     Review of Systems   Unable to perform ROS: Patient unresponsive   Objective:     Vital Signs (Most Recent):  Temp: 97.16 °F (36.2 °C) (03/22/22 1632)  Pulse: 76 (03/22/22 1632)  Resp: (!) 23 (03/22/22 1632)  BP: 103/61 (03/22/22 1600)  SpO2: 100 % (03/22/22 1632)   Vital Signs (24h Range):  Temp:  [95.18 °F (35.1 °C)-99.32 °F (37.4 °C)] 97.16 °F (36.2 °C)  Pulse:  [] 76  Resp:  [14-67] 23  SpO2:  [87 %-100 %] 100 %  BP: (103-149)/(55-86) 103/61     Weight: 69.2 kg (152 lb 8.9 oz)  Body mass index is 29.79 kg/m².    Physical Exam  Vitals reviewed.   Cardiovascular:      Rate and Rhythm: Normal rate.   Abdominal:      General: There is no distension.      Tenderness: There is no abdominal tenderness. There is no guarding.      Comments: PEG tube in place  Cholecystostomy tube in place         Significant Labs:  I have reviewed all pertinent lab results within the past 24 hours.  CBC:   Recent Labs   Lab 03/22/22  0340   WBC 3.69*   RBC 3.01*   HGB 7.8*   HCT 25.6*      MCV 85   MCH 25.9*   MCHC 30.5*     BMP:   Recent Labs   Lab 03/22/22  0340   GLU 89      K 3.7      CO2 28   BUN 3*   CREATININE 0.5   CALCIUM 9.3   MG 1.7     CMP:   Recent Labs   Lab 03/22/22  0340   GLU 89   CALCIUM 9.3   ALBUMIN 2.4*   PROT 7.2      K 3.7   CO2 28      BUN 3*   CREATININE 0.5   ALKPHOS 211*   ALT 82*   AST 49*   BILITOT 0.7       Significant Diagnostics:  I have reviewed all pertinent imaging results/findings within the past 24 hours.  CT: I have reviewed all  pertinent results/findings within the past 24 hours and my personal findings are:  Cholelithiasis noted.  No undrained fluid collections.  Cholecystostomy tube present within the gallbladder.

## 2022-03-22 NOTE — PROGRESS NOTES
"  03/22/2022      Admit Date: 3/17/2022  Genna Felix  Pulm Progress Note    Chief Complaint   Patient presents with    hypothermia     Pt sent by [facility] for hypothermia, nursing home staff reported to EMS t6hat pt warming blanket was not working       History of Present Illness:  Pt not arousing, with tactile stimuli her upper right gaze goes to left upper gaze.  Contracted.  On vent    Pt had prior pseudomonas , esbl klebsiella, and serratia  in sputum Jan 2022.  Review of record indicates h/o sz, cirrhosis, anoxic brain injury,   c diff, chr carroll.    Pt was NSR for  3 days 1/24/2022 -- Hospital Course:   Patient is a [facility] patient was unfortunately the victim of anoxic brain injury and persistent vegetative state who presented to the hospital with hypothermia.  She underwent evaluation for sepsis and started on broad-spectrum antimicrobial therapy.  She underwent further evaluation cause of, including CT scan of the abdomen as she had a recent cholecystostomy tube, as well as chest x-ray.  Likely etiology of her sepsis was secondary to pneumonia.  She improved with broad-spectrum antimicrobial therapy, and was discharged to long-term acute care to completed full course of 10 days of IV antibiotics.  Goals of care discussion was had with the patient's family, who opted for DNR, but continuation of the current treatment.       From ERP, Dr Nino hpi today:Genna Felix is a 61 y.o. female who presents to the ED via EMS from [facility] with an onset of hypothermia.  Patient was atop of a thermal blanket in order to regulate her temperature, but the blanket malfunctioned throughout the night causing patient's temperature to drop.  She presented to the ED with a rectal temp of 89 °F.  EMS notes the patient has a "blood shot" left eye and a steady "upward gaze."  Patient presents with a drain to the RUQ, booties to bilateral feet, trach in place and Carroll catheter, per EMS.  They note patient is a "full code" " and responds with a jump to startling noises only.  No other symptoms reported at this time.  Patient has a Hx of respiratory distress, hemangioma of intra-abdominal structure, tracheostomy dependence, anoxic brain damage, PE, HTN, protein claorie malnutrition, PEG tube placement, bed bound, total self-care deficit, nursing home resident, and cardiac arrest.  PSHx includes PEG with tracheostomy placement.  HPI is limited secondary to nonverbal patient.          Progress Note  PULMONARY    Admit Date: 3/17/2022   03/22/2022      SUBJECTIVE:     3/18 not arousing, more animated  3/19 no arousing,  3/21 no new c/o  3/22 (Montesinos)- remains on vent, VS stable. She is requiring warming blanket for hypothermia. Tube feeds are held currently. Pt had CT abd/p last night. RN reports rectal probe had stool impacted around it and s/p removal pt had 3 BMs last night.    PFSH and Allergies reviewed.    OBJECTIVE:     Vitals (Most recent):  Vitals:    03/22/22 0800   BP: (!) 103/55   Pulse: 89   Resp: 14   Temp: 96.62 °F (35.9 °C)       Vitals (24 hour range):  Temp:  [95.18 °F (35.1 °C)-98.78 °F (37.1 °C)]   Pulse:  [70-99]   Resp:  [14-67]   BP: (103-149)/(55-86)   SpO2:  [87 %-100 %]       Intake/Output Summary (Last 24 hours) at 3/22/2022 1020  Last data filed at 3/22/2022 0716  Gross per 24 hour   Intake 5900.16 ml   Output 4104 ml   Net 1796.16 ml          Physical Exam:  The patient's neuro status (alertness,orientation,cognitive function,motor skills,), pharyngeal exam (oral lesions, hygiene, abn dentition,), Neck (jvd,mass,thyroid,nodes in neck and above/below clavicle),RESPIRATORY(symmetry,effort,fremitus,percussion,auscultation),  Cor(rhythm,heart tones including gallops,perfusion,edema)ABD(distention,hepatic&splenomegaly,tenderness,masses), Skin(rash,cyanosis),Psyc(affect,judgement,).  Exam negative except for these pertinent findings:    Eyes open spontaneously, moves jaw, doesn't track or verbalize.  Trach in  place  chest is symmetric, no distress, normal percussion, normal fremitus and good normal breath sounds  Abdomen distended, +BS, ángel drain RUQ and PEG in place  Hamilton in place  Bilateral lower and upper ext 2+ pitting edema  Arms contracted    Radiographs reviewed: view by direct vision    CT abd/p 3/22-   1. Bibasilar airspace disease which could reflect atelectasis aspirate or pneumonia.  Trace bilateral pleural effusions.  2.  Multiple gallstones with cholecystostomy.  Gallbladder is decompressed.  3. Urinary bladder wall thickening could relate to inadequate distension.  Cystitis or outlet obstruction could also have this appearance.  4. Diffuse rectal wall thickening is presumably from under distension although proctitis possible in the appropriate clinical setting.      TTE 3/21-   · The left ventricle is normal in size with  · The estimated ejection fraction is 65%.  · Normal left ventricular diastolic function.  · Normal right ventricular size with normal right ventricular systolic function.  · There is mild pulmonary hypertension.  · Normal central venous pressure (3 mmHg).  · The estimated PA systolic pressure is 48 mmHg.  · On long-axis view there appears to be 1 cm echo artifact on the anterior leaf of the mitral valve left atrial side that is not seen on any other view-this probably represents mirror artifact  · Mild tricuspid regurgitation.        Labs     Recent Labs   Lab 03/22/22  0340   WBC 3.69*   HGB 7.8*   HCT 25.6*      BAND 1.0     Recent Labs   Lab 03/22/22  0340      K 3.7      CO2 28   BUN 3*   CREATININE 0.5   GLU 89   CALCIUM 9.3   MG 1.7   PHOS 2.1*   AST 49*   ALT 82*   ALKPHOS 211*   BILITOT 0.7   PROT 7.2   ALBUMIN 2.4*   No results for input(s): PH, PCO2, PO2, HCO3 in the last 24 hours.  Microbiology Results (last 7 days)     Procedure Component Value Units Date/Time    Urine Culture High Risk [[id number]]  (Abnormal)  (Susceptibility) Collected: 03/18/22 1010     Order Status: Completed Specimen: Urine, Catheterized Updated: 03/22/22 0923     Urine Culture, Routine PSEUDOMONAS AERUGINOSA   10,000 - 49,999 cfu/ml      Narrative:      Indicated criteria for high risk culture:->Other  Other (specify):->sepsis    Aerobic culture [[id number]]  (Abnormal)  (Susceptibility) Collected: 03/18/22 1630    Order Status: Completed Specimen: Wound from Elbow, Right Updated: 03/22/22 0922     Aerobic Bacterial Culture ACINETOBACTER BAUMANNII   Moderate        METHICILLIN RESISTANT STAPHYLOCOCCUS AUREUS  Many        PRESUMPTIVE PROTEUS SPECIES  Few  Identification and susceptibility pending      Blood culture [[id number]] Collected: 03/21/22 1428    Order Status: Completed Specimen: Blood Updated: 03/22/22 0515     Blood Culture, Routine No Growth to date    Blood culture x two cultures. Draw prior to antibiotics. [[id number]] Collected: 03/17/22 0854    Order Status: Completed Specimen: Blood from Peripheral, Right Hand Updated: 03/21/22 1412     Blood Culture, Routine No Growth to date      No Growth to date      No Growth to date      No Growth to date      No Growth to date    Narrative:      Aerobic and anaerobic    Urine culture [[id number]] Collected: 03/20/22 0100    Order Status: Completed Specimen: Urine Updated: 03/21/22 1152     Urine Culture, Routine No growth    Narrative:      Specimen Source->Urine    Culture, Respiratory with Gram Stain [[id number]]  (Abnormal)  (Susceptibility) Collected: 03/17/22 1337    Order Status: Completed Specimen: Respiratory from Tracheal Aspirate Updated: 03/21/22 0912     Respiratory Culture No S aureus or Pseudomonas isolated.      SERRATIA MARCESCENS  Many        PROVIDENCIA STUARTII  Many  Normal respiratory kenny also present       Gram Stain (Respiratory) Rare WBC's     Gram Stain (Respiratory) Many Gram positive cocci     Gram Stain (Respiratory) Many Gram negative rods    Blood culture x two cultures. Draw prior to antibiotics.  [[id number]]  (Abnormal)  (Susceptibility) Collected: 03/17/22 0844    Order Status: Completed Specimen: Blood Updated: 03/20/22 1249     Blood Culture, Routine Gram stain aer bottle: Gram positive cocci in chains resembling Strep       Results called to and read back by: RASHEED LACEY RN  03/18/2022        08:06      ENTEROCOCCUS FAECALIS    Narrative:      Aerobic and anaerobic          Impression:  Active Hospital Problems    Diagnosis  POA    *Pneumonia of right lower lobe due to infectious organism [J18.9]  Yes    Leaking PEG tube [K94.23]  Yes    Pressure injury of right elbow, stage 4 [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             .        Plan: t min 89, p 58 to 107, merrem/vanc, midodrine, lovenox 40/d,  28% ox,wbc 3.7, procal 0.04, u/a pos for blood and wbc, cxr min rll opacification-- ct abd and cxr 1/26 with rll density from apparent atelectasis.     Urine culture positive for proteus 1/5/2022 s to merrem.  Not clear if had c diff prophylaxsis recent admits but  c diff Pos May 2021.    I do not see clear pneumonia with cxr pattern and procal.  Would monitor on merrem.       H/o prior c diff -- consider prophylaxsis for c diff?  Monitor.    3/18 tm 100.6, p 124,   I/o 1503/1550  merrem and vanc  lovenox       U/a abn,procal low and cxr stable from Jan-- would rec urine culture if not done    Pt called pneumonia but findings are minimal  -- await cultures, adjust abx, return to [place] when stable.      3/19 tm 100.04, p to 119, merrem and vanc, rm air,   gnr sputum    Will recheck later, need sensitivities to determine dc abx.  3/21no fever, vss, no fever,    providentia and serratia in sputum,  pseudomonas in urine <50k, elbow wound with gnr/staph with sensitivities pending.    merrem adequate for providentia and serratia-- pseudomonas  and not significant at this time.  Continue vanc for now for elbow wound.    Today day 5 merrem, day 7 adequate to stop.    Above from Dr London and below from Dr Montesinos:    - continue vent support no weaning  - multiple infections are being addressed (pneumonia, wound, and UTI)- antibiotics per ID, getting meropenem + vanco with enteral vanc for c diff prophylaxis  - airway clearance regimen and trach cares w/ suctioning- continue  - bowel regimen and management of feeds per GI  - lovenox for DVT prophylaxis  - midodrine continue  - warming blanket for hypothermia    The following were evaluated and adjusted as needed: mechanical ventilator settings and weaning status, intravenous fluids and nutritional status, antibiotics and acid base balance and oxygenation needs       Critical Care  - THE PATIENT HAS A HIGH PROBABILITY OF IMMINENT OR LIFE THREATENING DETERIORATION.  Over 50%time of encounter was in direct care at bedside.  Time was 30 to 74 minutes required for patient care.  Time needed for all of the above totaled 32 minutes.      Dora Montesinos MD  Pulmonary & Critical Care Medicine

## 2022-03-23 LAB
ALBUMIN SERPL BCP-MCNC: 2.4 G/DL (ref 3.5–5.2)
ALP SERPL-CCNC: 202 U/L (ref 55–135)
ALT SERPL W/O P-5'-P-CCNC: 60 U/L (ref 10–44)
ANION GAP SERPL CALC-SCNC: 8 MMOL/L (ref 8–16)
ANISOCYTOSIS BLD QL SMEAR: SLIGHT
AST SERPL-CCNC: 35 U/L (ref 10–40)
BACTERIA SPEC AEROBE CULT: ABNORMAL
BASOPHILS # BLD AUTO: ABNORMAL K/UL (ref 0–0.2)
BASOPHILS NFR BLD: 0 % (ref 0–1.9)
BILIRUB SERPL-MCNC: 0.6 MG/DL (ref 0.1–1)
BUN SERPL-MCNC: 5 MG/DL (ref 6–20)
CALCIUM SERPL-MCNC: 8.8 MG/DL (ref 8.7–10.5)
CHLORIDE SERPL-SCNC: 105 MMOL/L (ref 95–110)
CO2 SERPL-SCNC: 26 MMOL/L (ref 23–29)
CREAT SERPL-MCNC: 0.4 MG/DL (ref 0.5–1.4)
DIFFERENTIAL METHOD: ABNORMAL
EOSINOPHIL # BLD AUTO: ABNORMAL K/UL (ref 0–0.5)
EOSINOPHIL NFR BLD: 3 % (ref 0–8)
ERYTHROCYTE [DISTWIDTH] IN BLOOD BY AUTOMATED COUNT: 17 % (ref 11.5–14.5)
EST. GFR  (AFRICAN AMERICAN): >60 ML/MIN/1.73 M^2
EST. GFR  (NON AFRICAN AMERICAN): >60 ML/MIN/1.73 M^2
GLUCOSE SERPL-MCNC: 97 MG/DL (ref 70–110)
HCT VFR BLD AUTO: 25.5 % (ref 37–48.5)
HGB BLD-MCNC: 7.9 G/DL (ref 12–16)
IMM GRANULOCYTES # BLD AUTO: ABNORMAL K/UL (ref 0–0.04)
IMM GRANULOCYTES NFR BLD AUTO: ABNORMAL % (ref 0–0.5)
LYMPHOCYTES # BLD AUTO: ABNORMAL K/UL (ref 1–4.8)
LYMPHOCYTES NFR BLD: 35 % (ref 18–48)
MAGNESIUM SERPL-MCNC: 1.8 MG/DL (ref 1.6–2.6)
MCH RBC QN AUTO: 26.1 PG (ref 27–31)
MCHC RBC AUTO-ENTMCNC: 31 G/DL (ref 32–36)
MCV RBC AUTO: 84 FL (ref 82–98)
MONOCYTES # BLD AUTO: ABNORMAL K/UL (ref 0.3–1)
MONOCYTES NFR BLD: 5 % (ref 4–15)
NEUTROPHILS NFR BLD: 53 % (ref 38–73)
NEUTS BAND NFR BLD MANUAL: 4 %
NRBC BLD-RTO: 1 /100 WBC
PHOSPHATE SERPL-MCNC: 2.3 MG/DL (ref 2.7–4.5)
PLATELET # BLD AUTO: 304 K/UL (ref 150–450)
PLATELET BLD QL SMEAR: ABNORMAL
PMV BLD AUTO: 9.3 FL (ref 9.2–12.9)
POCT GLUCOSE: 115 MG/DL (ref 70–110)
POCT GLUCOSE: 132 MG/DL (ref 70–110)
POCT GLUCOSE: 137 MG/DL (ref 70–110)
POTASSIUM SERPL-SCNC: 3.9 MMOL/L (ref 3.5–5.1)
PROT SERPL-MCNC: 7.2 G/DL (ref 6–8.4)
RBC # BLD AUTO: 3.03 M/UL (ref 4–5.4)
SODIUM SERPL-SCNC: 139 MMOL/L (ref 136–145)
WBC # BLD AUTO: 4.21 K/UL (ref 3.9–12.7)

## 2022-03-23 PROCEDURE — 99222 1ST HOSP IP/OBS MODERATE 55: CPT | Mod: ,,, | Performed by: FAMILY MEDICINE

## 2022-03-23 PROCEDURE — 99900026 HC AIRWAY MAINTENANCE (STAT)

## 2022-03-23 PROCEDURE — A4216 STERILE WATER/SALINE, 10 ML: HCPCS | Performed by: INTERNAL MEDICINE

## 2022-03-23 PROCEDURE — 99222 PR INITIAL HOSPITAL CARE,LEVL II: ICD-10-PCS | Mod: ,,, | Performed by: FAMILY MEDICINE

## 2022-03-23 PROCEDURE — 63600175 PHARM REV CODE 636 W HCPCS: Performed by: HOSPITALIST

## 2022-03-23 PROCEDURE — 99233 PR SUBSEQUENT HOSPITAL CARE,LEVL III: ICD-10-PCS | Mod: S$GLB,,, | Performed by: STUDENT IN AN ORGANIZED HEALTH CARE EDUCATION/TRAINING PROGRAM

## 2022-03-23 PROCEDURE — 25000242 PHARM REV CODE 250 ALT 637 W/ HCPCS: Performed by: INTERNAL MEDICINE

## 2022-03-23 PROCEDURE — 20000000 HC ICU ROOM

## 2022-03-23 PROCEDURE — 99233 PR SUBSEQUENT HOSPITAL CARE,LEVL III: ICD-10-PCS | Mod: ,,, | Performed by: INTERNAL MEDICINE

## 2022-03-23 PROCEDURE — 27000221 HC OXYGEN, UP TO 24 HOURS

## 2022-03-23 PROCEDURE — 63600175 PHARM REV CODE 636 W HCPCS: Performed by: STUDENT IN AN ORGANIZED HEALTH CARE EDUCATION/TRAINING PROGRAM

## 2022-03-23 PROCEDURE — 63600175 PHARM REV CODE 636 W HCPCS: Performed by: INTERNAL MEDICINE

## 2022-03-23 PROCEDURE — 85027 COMPLETE CBC AUTOMATED: CPT | Performed by: INTERNAL MEDICINE

## 2022-03-23 PROCEDURE — 94761 N-INVAS EAR/PLS OXIMETRY MLT: CPT

## 2022-03-23 PROCEDURE — 99900022

## 2022-03-23 PROCEDURE — 25000003 PHARM REV CODE 250: Performed by: INTERNAL MEDICINE

## 2022-03-23 PROCEDURE — 99900035 HC TECH TIME PER 15 MIN (STAT)

## 2022-03-23 PROCEDURE — 94640 AIRWAY INHALATION TREATMENT: CPT

## 2022-03-23 PROCEDURE — 25000003 PHARM REV CODE 250: Performed by: STUDENT IN AN ORGANIZED HEALTH CARE EDUCATION/TRAINING PROGRAM

## 2022-03-23 PROCEDURE — 94003 VENT MGMT INPAT SUBQ DAY: CPT

## 2022-03-23 PROCEDURE — 85007 BL SMEAR W/DIFF WBC COUNT: CPT | Performed by: INTERNAL MEDICINE

## 2022-03-23 PROCEDURE — 99233 SBSQ HOSP IP/OBS HIGH 50: CPT | Mod: S$GLB,,, | Performed by: STUDENT IN AN ORGANIZED HEALTH CARE EDUCATION/TRAINING PROGRAM

## 2022-03-23 PROCEDURE — 84100 ASSAY OF PHOSPHORUS: CPT | Performed by: INTERNAL MEDICINE

## 2022-03-23 PROCEDURE — 80053 COMPREHEN METABOLIC PANEL: CPT | Performed by: INTERNAL MEDICINE

## 2022-03-23 PROCEDURE — 99233 SBSQ HOSP IP/OBS HIGH 50: CPT | Mod: ,,, | Performed by: INTERNAL MEDICINE

## 2022-03-23 PROCEDURE — 27000207 HC ISOLATION

## 2022-03-23 PROCEDURE — 83735 ASSAY OF MAGNESIUM: CPT | Performed by: INTERNAL MEDICINE

## 2022-03-23 RX ORDER — DEXTROSE MONOHYDRATE AND SODIUM CHLORIDE 5; .9 G/100ML; G/100ML
INJECTION, SOLUTION INTRAVENOUS CONTINUOUS
Status: DISCONTINUED | OUTPATIENT
Start: 2022-03-24 | End: 2022-03-24

## 2022-03-23 RX ORDER — IPRATROPIUM BROMIDE AND ALBUTEROL SULFATE 2.5; .5 MG/3ML; MG/3ML
3 SOLUTION RESPIRATORY (INHALATION) EVERY 12 HOURS
Status: DISCONTINUED | OUTPATIENT
Start: 2022-03-23 | End: 2022-03-26 | Stop reason: HOSPADM

## 2022-03-23 RX ORDER — SULFAMETHOXAZOLE AND TRIMETHOPRIM 800; 160 MG/1; MG/1
1 TABLET ORAL 2 TIMES DAILY
Status: DISCONTINUED | OUTPATIENT
Start: 2022-03-23 | End: 2022-03-26 | Stop reason: HOSPADM

## 2022-03-23 RX ORDER — SODIUM,POTASSIUM PHOSPHATES 280-250MG
1 POWDER IN PACKET (EA) ORAL
Status: COMPLETED | OUTPATIENT
Start: 2022-03-23 | End: 2022-03-23

## 2022-03-23 RX ORDER — ACETYLCYSTEINE 100 MG/ML
4 SOLUTION ORAL; RESPIRATORY (INHALATION) EVERY 12 HOURS
Status: DISCONTINUED | OUTPATIENT
Start: 2022-03-23 | End: 2022-03-26 | Stop reason: HOSPADM

## 2022-03-23 RX ADMIN — DOCUSATE SODIUM AND SENNOSIDES 1 TABLET: 8.6; 5 TABLET, FILM COATED ORAL at 08:03

## 2022-03-23 RX ADMIN — IPRATROPIUM BROMIDE AND ALBUTEROL SULFATE 3 ML: 2.5; .5 SOLUTION RESPIRATORY (INHALATION) at 12:03

## 2022-03-23 RX ADMIN — COLLAGENASE SANTYL: 250 OINTMENT TOPICAL at 08:03

## 2022-03-23 RX ADMIN — POTASSIUM & SODIUM PHOSPHATES POWDER PACK 280-160-250 MG 1 PACKET: 280-160-250 PACK at 09:03

## 2022-03-23 RX ADMIN — ACETYLCYSTEINE 4 ML: 100 SOLUTION ORAL; RESPIRATORY (INHALATION) at 07:03

## 2022-03-23 RX ADMIN — SODIUM CHLORIDE, PRESERVATIVE FREE 10 ML: 5 INJECTION INTRAVENOUS at 06:03

## 2022-03-23 RX ADMIN — HYPROMELLOSE 2910 1 DROP: 5 SOLUTION OPHTHALMIC at 09:03

## 2022-03-23 RX ADMIN — MEROPENEM AND SODIUM CHLORIDE 1 G: 1 INJECTION, SOLUTION INTRAVENOUS at 09:03

## 2022-03-23 RX ADMIN — IPRATROPIUM BROMIDE AND ALBUTEROL SULFATE 3 ML: 2.5; .5 SOLUTION RESPIRATORY (INHALATION) at 06:03

## 2022-03-23 RX ADMIN — MEROPENEM AND SODIUM CHLORIDE 1 G: 1 INJECTION, SOLUTION INTRAVENOUS at 04:03

## 2022-03-23 RX ADMIN — HYPROMELLOSE 2910 1 DROP: 5 SOLUTION OPHTHALMIC at 01:03

## 2022-03-23 RX ADMIN — MIDODRINE HYDROCHLORIDE 10 MG: 5 TABLET ORAL at 09:03

## 2022-03-23 RX ADMIN — Medication 125 MG: at 08:03

## 2022-03-23 RX ADMIN — IPRATROPIUM BROMIDE AND ALBUTEROL SULFATE 3 ML: 2.5; .5 SOLUTION RESPIRATORY (INHALATION) at 07:03

## 2022-03-23 RX ADMIN — VANCOMYCIN HYDROCHLORIDE 1250 MG: 1.25 INJECTION, POWDER, LYOPHILIZED, FOR SOLUTION INTRAVENOUS at 05:03

## 2022-03-23 RX ADMIN — HYPROMELLOSE 2910 1 DROP: 5 SOLUTION OPHTHALMIC at 08:03

## 2022-03-23 RX ADMIN — ENOXAPARIN SODIUM 40 MG: 40 INJECTION SUBCUTANEOUS at 05:03

## 2022-03-23 RX ADMIN — ACETYLCYSTEINE 4 ML: 100 INHALANT RESPIRATORY (INHALATION) at 06:03

## 2022-03-23 RX ADMIN — Medication 125 MG: at 09:03

## 2022-03-23 RX ADMIN — SODIUM CHLORIDE, PRESERVATIVE FREE 10 ML: 5 INJECTION INTRAVENOUS at 12:03

## 2022-03-23 RX ADMIN — ACETYLCYSTEINE 4 ML: 100 INHALANT RESPIRATORY (INHALATION) at 12:03

## 2022-03-23 RX ADMIN — MINERAL SUPPLEMENT IRON 300 MG / 5 ML STRENGTH LIQUID 100 PER BOX UNFLAVORED 300 MG: at 08:03

## 2022-03-23 RX ADMIN — MEROPENEM AND SODIUM CHLORIDE 1 G: 1 INJECTION, SOLUTION INTRAVENOUS at 01:03

## 2022-03-23 RX ADMIN — SULFAMETHOXAZOLE AND TRIMETHOPRIM 1 TABLET: 800; 160 TABLET ORAL at 09:03

## 2022-03-23 RX ADMIN — HYPROMELLOSE 2910 1 DROP: 5 SOLUTION OPHTHALMIC at 05:03

## 2022-03-23 RX ADMIN — POTASSIUM & SODIUM PHOSPHATES POWDER PACK 280-160-250 MG 1 PACKET: 280-160-250 PACK at 05:03

## 2022-03-23 RX ADMIN — SCOPALAMINE 1 PATCH: 1 PATCH, EXTENDED RELEASE TRANSDERMAL at 01:03

## 2022-03-23 NOTE — CARE UPDATE
03/22/22 1912   Patient Assessment/Suction   Level of Consciousness (AVPU) responds to voice   Respiratory Effort Normal;Unlabored   Expansion/Accessory Muscles/Retractions expansion symmetric;no use of accessory muscles   All Lung Fields Breath Sounds Anterior:;coarse;diminished   Rhythm/Pattern, Respiratory assisted mechanically   Cough Frequency with stimulation   Cough Type assisted   Suction Method oral;tracheal   $ Suction Charges Inline Suction Procedure Stat Charge   Secretions Amount moderate   Secretions Color pale;yellow   Secretions Characteristics thick   PRE-TX-O2   O2 Device (Oxygen Therapy) ventilator   $ Is the patient on Low Flow Oxygen? Yes   Oxygen Concentration (%) 21   SpO2 98 %   Pulse Oximetry Type Continuous   $ Pulse Oximetry - Multiple Charge Pulse Oximetry - Multiple   Pulse 84   Resp 18   Temp 98.06 °F (36.7 °C)   ETCO2   $ ETCO2 Usage Currently wearing   ETCO2 (mmHg) 35 mmHg   ETCO2 Device Type Bedside Monitor;Ventilator;Artificial Airway   Aerosol Therapy   $ Aerosol Therapy Charges Aerosol Treatment   Respiratory Treatment Status (SVN) given   Treatment Route (SVN) in-line;ventilator   Patient Position (SVN) HOB elevated   Post Treatment Assessment (SVN) breath sounds unchanged   Signs of Intolerance (SVN) none   Breath Sounds Post-Respiratory Treatment   Throughout All Fields Post-Treatment All Fields   Throughout All Fields Post-Treatment no change   Post-treatment Heart Rate (beats/min) 81   Post-treatment Resp Rate (breaths/min) 23   Skin Integrity   $ Wound Care Tech Time 15 min   Area Observed Neck under tracheostomy   Skin Appearance without discoloration   Barrier used? Other (see comments)  (drain sponge)        Surgical Airway Portex Cuffed   No placement date or time found.   Present Prior to Hospital Arrival?: Yes  Brand: Portex  Airway Device Size: 8.0  Style: Cuffed   Cuff Pressure 38 cm H2O   Cuff Inflation? Inflated   Speaking Valve Usage Not wearing   Status  Secured   Site Assessment Clean;Dry;Midline   Airway Safety   Ambu bag with the patient? Yes, Adult Ambu   Is mask with the patient? Yes, Adult Mask   Vent Select   Conventional Vent Y   Charged w/in last 24h YES   Preset Conventional Ventilator Settings   Vent Type NKV-550   Ventilation Type VC   Vent Mode A/CMV-VC   Humidity HME   Set Rate 14 BPM   Vt Set 400 mL   PEEP/CPAP 5.9 cmH20   Waveform RAMP   I-Trigger Type  Flow   Trigger Sensitivity Flow/I-Trigger 2.5 L/min   Patient Ventilator Parameters   Resp Rate Total 18 br/min   Peak Airway Pressure 36.3 cmH2O   Mean Airway Pressure 16.1 cmH20   Plateau Pressure 35.9 cmH20   Exhaled Vt 393 mL   Total Ve 6.58 mL   I:E Ratio Measured 1:2.5   Total PEEP 5.2 cmH20   Tubing ID (mm) 8 mm   Tube Type Trach   Inspired Tidal Volume (VTI) 408 mL   Conventional Ventilator Alarms   Alarms On Y   Ve High Alarm 14 L/min   Ve Low Alarm 3 L/min   Vt High Alarm 13 mL   Vt Low Alarm 5 mL   Resp Rate High Alarm 40 br/min   Press High Alarm 55 cmH2O   Apnea Rate 14   Apnea Volume (mL) 400 mL   Ready to Wean/Extubation Screen   FIO2<=50 (chart decimal) 0.21   MV<16L (chart vol.) 6.58   PEEP <=8 (chart #) 5.9   Ready to Wean Parameters   F/VT Ratio<105 (RSBI) (!) 45.8

## 2022-03-23 NOTE — PLAN OF CARE
Problem: Adult Inpatient Plan of Care  Goal: Plan of Care Review  Outcome: Ongoing, Progressing  Goal: Patient-Specific Goal (Individualized)  Outcome: Ongoing, Progressing  Goal: Absence of Hospital-Acquired Illness or Injury  Outcome: Ongoing, Progressing  Goal: Optimal Comfort and Wellbeing  Outcome: Ongoing, Progressing  Goal: Readiness for Transition of Care  Outcome: Ongoing, Progressing     Problem: Aspiration (Enteral Nutrition)  Goal: Absence of Aspiration Signs and Symptoms  Outcome: Ongoing, Progressing     Problem: Device-Related Complication Risk (Enteral Nutrition)  Goal: Safe, Effective Therapy Delivery  Outcome: Ongoing, Progressing     Problem: Feeding Intolerance (Enteral Nutrition)  Goal: Feeding Tolerance  Outcome: Ongoing, Progressing

## 2022-03-23 NOTE — CARE UPDATE
Plan for [facility]END[facility] tomorrow at 1pm per Dr. Last. Will hold TF at [facility] and start IVF with KCL.     Bactrim started x12 d per ID. Will receive first dose this pm. T max today 99.9 SD. Tolerating TF w/o residuals.     Good UOP. Mod amt soft brown stool passed per rectal tube.     Wound care performed to R elbow ulcer as ordered

## 2022-03-23 NOTE — PLAN OF CARE
Problem: Adult Inpatient Plan of Care  Goal: Readiness for Transition of Care  Outcome: Ongoing, Progressing     Problem: Feeding Intolerance (Enteral Nutrition)  Goal: Feeding Tolerance  Outcome: Ongoing, Progressing     Problem: Adult Inpatient Plan of Care  Goal: Optimal Comfort and Wellbeing  Outcome: Ongoing, Not Progressing       Patient remains on trach/vent. General surg consulted today. Left voicemail with cardiology for consult. Tube feedings restarted - current rate of 30 ml/hr and goal of 50 ml/hr. No episodes of emesis. Warming blanket frequently in use to keep temperature WDL. Normal sinus rhythm with frequent PVCs on monitor. BM x 1. Mag, phos, and potassium replaced. Triple lumen PICC to right arm - continuous D5NS & KVO x 2. Right elbow dressing change performed. Chronic indwelling carroll and biliary drain remains patent. Bed locked in lowest position with side rails up x 4 and alarm on.

## 2022-03-23 NOTE — CONSULTS
Chief complaint:  hypothermia (Pt sent by [facility] for hypothermia, nursing home staff reported to EMS t6hat pt warming blanket was not working)      HPI:  Genna Felix is a 61 y.o. female presenting with a right elbow unstagable pressure ulcer that was POA. Pt has Hx of anoxic brain injury and a trach and PEG. Pt elbow wound was POA. Pt unable to assist with H and P    PMH:  As per HPI and below:  Past Medical History:   Diagnosis Date    Anoxic brain damage 07/2020    Bedbound 07/2020    Cardiac arrest 07/2020    Cirrhosis     GERD (gastroesophageal reflux disease)     Hemangioma of intra-abdominal structure     Hypertension     Nursing home resident     Protein calorie malnutrition     Pulmonary embolus     Respiratory distress     S/P percutaneous endoscopic gastrostomy (PEG) tube placement 07/2020    Total self-care deficit 07/2020    Tracheostomy dependence     7/2020       Social History     Socioeconomic History    Marital status:    Tobacco Use    Smoking status: Never Smoker    Smokeless tobacco: Never Used   Substance and Sexual Activity    Alcohol use: Not Currently    Drug use: Not Currently    Sexual activity: Not Currently       Past Surgical History:   Procedure Laterality Date    PEG W/TRACHEOSTOMY PLACEMENT  07/27/2020    SKIN GRAFT      buttocks       Family History   Problem Relation Age of Onset    No Known Problems Mother     No Known Problems Father        Review of patient's allergies indicates:  No Known Allergies    No current facility-administered medications on file prior to encounter.     Current Outpatient Medications on File Prior to Encounter   Medication Sig Dispense Refill    acetaminophen (TYLENOL) 325 MG tablet 2 tablets (650 mg total) by Per G Tube route every 4 (four) hours as needed for Pain or Temperature greater than (100.4F).  0    acetylcysteine 100 mg/ml, 10%, (MUCOMYST) 100 mg/mL (10 %) nebulizer solution Take 4 mLs by nebulization every  8 (eight) hours.  12    albuterol-ipratropium (DUO-NEB) 2.5 mg-0.5 mg/3 mL nebulizer solution Take 3 mLs by nebulization every 6 (six) hours. Rescue 1 Box 0    artificial tears (ISOPTO TEARS) 0.5 % ophthalmic solution Place 1 drop into both eyes as needed.      ergocalciferol (ERGOCALCIFEROL) 50,000 unit Cap Take 50,000 Units by mouth every 7 days.      ferrous sulfate 300 mg (60 mg iron)/5 mL syrup Take 300 mg by mouth As instructed. Every other day      meropenem-0.9% sodium chloride 500 mg/50 mL PgBk Inject 50 mLs (500 mg total) into the vein every 8 (eight) hours.      midodrine (PROAMATINE) 10 MG tablet 1 tablet (10 mg total) by Per G Tube route 3 (three) times daily. 90 tablet 11    polyethylene glycol (GLYCOLAX) 17 gram PwPk Take by mouth daily as needed.      scopolamine (TRANSDERM-SCOP) 1.3-1.5 mg (1 mg over 3 days) Place 1 patch onto the skin Every 3 (three) days. 30 patch 0    [DISCONTINUED] clonazePAM (KLONOPIN) 0.5 MG tablet 1 tablet (0.5 mg total) by Per G Tube route 3 (three) times daily. 90 tablet 0       ROS: Unable to obtain due to acuity of patient        Physical Exam:     Vitals:    22 0421 22 0500 22 0600 22 0644   BP:  122/72 127/73    Pulse: 95 91 91 95   Resp: (!) 25 20 17 (!) 27   Temp: 98.6 °F (37 °C) 98.42 °F (36.9 °C) 98.42 °F (36.9 °C)    TempSrc:   Core Rectal    SpO2: 98% 97% 98% 100%   Weight:       Height:           BP  Min: 88/47  Max: 149/86  Temp  Av.4 °F (36.3 °C)  Min: 89 °F (31.7 °C)  Max: 100.6 °F (38.1 °C)  Pulse  Av.4  Min: 58  Max: 146  Resp  Av.5  Min: 0  Max: 91  SpO2  Av.3 %  Min: 83 %  Max: 100 %  Height  Av' (152.4 cm)  Min: 5' (152.4 cm)  Max: 5' (152.4 cm)  Weight  Av kg (154 lb 3.9 oz)  Min: 65.8 kg (145 lb)  Max: 74.5 kg (164 lb 3.9 oz)    Body mass index is 30.48 kg/m².          General:             Well developed, well nourished, no apparent distress  HEENT:              NCAT, no JVD, mucous membranes  moist, EOM intact  Cardiovascular:  Regular rate and rhythm, normal S1, normal S2, No murmurs, rubs, or gallops  Respiratory:        Normal breath sounds, no wheezes, no rales, no rhonchi  Abdomen:           Bowel sounds present, non tender, non distended, no masses, no hepatojugular reflux  Extremities:        No clubbing, no cyanosis, no edema  Neurological:      No focal deficits  Skin:                   No obvious rashes or erythema, right elbow unstagable pressure ulcer POA        Lab Results   Component Value Date    WBC 4.21 03/23/2022    HGB 7.9 (L) 03/23/2022    HCT 25.5 (L) 03/23/2022    MCV 84 03/23/2022     03/23/2022     No results found for: CHOL  No results found for: HDL  No results found for: LDLCALC  No results found for: TRIG  No results found for: CHOLHDL  CMP  Recent Labs   Lab 03/23/22  0436   GLU 97   CALCIUM 8.8   ALBUMIN 2.4*   PROT 7.2      K 3.9   CO2 26      BUN 5*   CREATININE 0.4*   ALKPHOS 202*   ALT 60*   AST 35   BILITOT 0.6      Lab Results   Component Value Date    TSH 1.824 01/24/2022           Assessment and Recommendations       Diagnoses:    1. Right elbow unstagable pressure ulcer POA    Plan:  1. Santyl + dry dressing daily

## 2022-03-23 NOTE — PROGRESS NOTES
"  03/23/2022      Admit Date: 3/17/2022  Genna Felix  Pulm Progress Note    Chief Complaint   Patient presents with    hypothermia     Pt sent by [facility] for hypothermia, nursing home staff reported to EMS t6hat pt warming blanket was not working       History of Present Illness:  Pt not arousing, with tactile stimuli her upper right gaze goes to left upper gaze.  Contracted.  On vent    Pt had prior pseudomonas , esbl klebsiella, and serratia  in sputum Jan 2022.  Review of record indicates h/o sz, cirrhosis, anoxic brain injury,   c diff, chr carroll.    Pt was NSR for  3 days 1/24/2022 -- Hospital Course:   Patient is a [place] patient was unfortunately the victim of anoxic brain injury and persistent vegetative state who presented to the hospital with hypothermia.  She underwent evaluation for sepsis and started on broad-spectrum antimicrobial therapy.  She underwent further evaluation cause of, including CT scan of the abdomen as she had a recent cholecystostomy tube, as well as chest x-ray.  Likely etiology of her sepsis was secondary to pneumonia.  She improved with broad-spectrum antimicrobial therapy, and was discharged to long-term acute care to completed full course of 10 days of IV antibiotics.  Goals of care discussion was had with the patient's family, who opted for DNR, but continuation of the current treatment.       From ERP, Dr Nino hpi today:Genna Felix is a 61 y.o. female who presents to the ED via EMS from [place] with an onset of hypothermia.  Patient was atop of a thermal blanket in order to regulate her temperature, but the blanket malfunctioned throughout the night causing patient's temperature to drop.  She presented to the ED with a rectal temp of 89 °F.  EMS notes the patient has a "blood shot" left eye and a steady "upward gaze."  Patient presents with a drain to the RUQ, booties to bilateral feet, trach in place and Carroll catheter, per EMS.  They note patient is a "full code" " and responds with a jump to startling noises only.  No other symptoms reported at this time.  Patient has a Hx of respiratory distress, hemangioma of intra-abdominal structure, tracheostomy dependence, anoxic brain damage, PE, HTN, protein claorie malnutrition, PEG tube placement, bed bound, total self-care deficit, nursing home resident, and cardiac arrest.  PSHx includes PEG with tracheostomy placement.  HPI is limited secondary to nonverbal patient.          Progress Note  PULMONARY    Admit Date: 3/17/2022   03/23/2022      SUBJECTIVE:     3/18 not arousing, more animated  3/19 no arousing,  3/21 no new c/o  3/22 (Montesinos)- remains on vent, VS stable. She is requiring warming blanket for hypothermia. Tube feeds are held currently. Pt had CT abd/p last night. RN reports rectal probe had stool impacted around it and s/p removal pt had 3 BMs last night.  3/23- remains on vent, no new issues    PFSH and Allergies reviewed.    OBJECTIVE:     Vitals (Most recent):  Vitals:    03/23/22 1212   BP:    Pulse: 102   Resp: (!) 29   Temp:        Vitals (24 hour range):  Temp:  [97.16 °F (36.2 °C)-99.68 °F (37.6 °C)]   Pulse:  []   Resp:  [14-40]   BP: (103-135)/(55-75)   SpO2:  [93 %-100 %]       Intake/Output Summary (Last 24 hours) at 3/23/2022 1213  Last data filed at 3/23/2022 0800  Gross per 24 hour   Intake 3253.37 ml   Output 1340 ml   Net 1913.37 ml          Physical Exam:  The patient's neuro status (alertness,orientation,cognitive function,motor skills,), pharyngeal exam (oral lesions, hygiene, abn dentition,), Neck (jvd,mass,thyroid,nodes in neck and above/below clavicle),RESPIRATORY(symmetry,effort,fremitus,percussion,auscultation),  Cor(rhythm,heart tones including gallops,perfusion,edema)ABD(distention,hepatic&splenomegaly,tenderness,masses), Skin(rash,cyanosis),Psyc(affect,judgement,).  Exam negative except for these pertinent findings:    Eyes open spontaneously, moves jaw, doesn't track or  verbalize.  Trach in place  chest is symmetric, no distress, normal percussion, normal fremitus and good normal breath sounds  Abdomen distended, +BS, ángel drain RUQ and PEG in place  Hamilton in place  Bilateral lower and upper ext 2+ pitting edema  Arms contracted    Radiographs reviewed: view by direct vision    CT abd/p 3/22-   1. Bibasilar airspace disease which could reflect atelectasis aspirate or pneumonia.  Trace bilateral pleural effusions.  2.  Multiple gallstones with cholecystostomy.  Gallbladder is decompressed.  3. Urinary bladder wall thickening could relate to inadequate distension.  Cystitis or outlet obstruction could also have this appearance.  4. Diffuse rectal wall thickening is presumably from under distension although proctitis possible in the appropriate clinical setting.      TTE 3/21-   · The left ventricle is normal in size with  · The estimated ejection fraction is 65%.  · Normal left ventricular diastolic function.  · Normal right ventricular size with normal right ventricular systolic function.  · There is mild pulmonary hypertension.  · Normal central venous pressure (3 mmHg).  · The estimated PA systolic pressure is 48 mmHg.  · On long-axis view there appears to be 1 cm echo artifact on the anterior leaf of the mitral valve left atrial side that is not seen on any other view-this probably represents mirror artifact  · Mild tricuspid regurgitation.        Labs     Recent Labs   Lab 03/23/22  0436   WBC 4.21   HGB 7.9*   HCT 25.5*      BAND 4.0     Recent Labs   Lab 03/23/22  0436      K 3.9      CO2 26   BUN 5*   CREATININE 0.4*   GLU 97   CALCIUM 8.8   MG 1.8   PHOS 2.3*   AST 35   ALT 60*   ALKPHOS 202*   BILITOT 0.6   PROT 7.2   ALBUMIN 2.4*   No results for input(s): PH, PCO2, PO2, HCO3 in the last 24 hours.  Microbiology Results (last 7 days)     Procedure Component Value Units Date/Time    Blood culture [[id number]] Collected: 03/21/22 1428    Order Status:  Completed Specimen: Blood Updated: 03/22/22 2222     Blood Culture, Routine No Growth to date      No Growth to date    Blood culture x two cultures. Draw prior to antibiotics. [[id number]] Collected: 03/17/22 0854    Order Status: Completed Specimen: Blood from Peripheral, Right Hand Updated: 03/22/22 1412     Blood Culture, Routine No growth after 5 days.    Narrative:      Aerobic and anaerobic    Aerobic culture [[id number]]  (Abnormal)  (Susceptibility) Collected: 03/18/22 1630    Order Status: Completed Specimen: Wound from Elbow, Right Updated: 03/22/22 1103     Aerobic Bacterial Culture ACINETOBACTER BAUMANNII   Moderate        METHICILLIN RESISTANT STAPHYLOCOCCUS AUREUS  Many        PROTEUS MIRABILIS  Few      Urine Culture High Risk [[id number]]  (Abnormal)  (Susceptibility) Collected: 03/18/22 1010    Order Status: Completed Specimen: Urine, Catheterized Updated: 03/22/22 0923     Urine Culture, Routine PSEUDOMONAS AERUGINOSA   10,000 - 49,999 cfu/ml      Narrative:      Indicated criteria for high risk culture:->Other  Other (specify):->sepsis    Urine culture [[id number]] Collected: 03/20/22 0100    Order Status: Completed Specimen: Urine Updated: 03/21/22 1152     Urine Culture, Routine No growth    Narrative:      Specimen Source->Urine    Culture, Respiratory with Gram Stain [[id number]]  (Abnormal)  (Susceptibility) Collected: 03/17/22 1337    Order Status: Completed Specimen: Respiratory from Tracheal Aspirate Updated: 03/21/22 0912     Respiratory Culture No S aureus or Pseudomonas isolated.      SERRATIA MARCESCENS  Many        PROVIDENCIA STUARTII  Many  Normal respiratory kenny also present       Gram Stain (Respiratory) Rare WBC's     Gram Stain (Respiratory) Many Gram positive cocci     Gram Stain (Respiratory) Many Gram negative rods    Blood culture x two cultures. Draw prior to antibiotics. [[id number]]  (Abnormal)  (Susceptibility) Collected: 03/17/22 0844    Order Status: Completed  Specimen: Blood Updated: 03/20/22 1249     Blood Culture, Routine Gram stain aer bottle: Gram positive cocci in chains resembling Strep       Results called to and read back by: RASHEED LACEY RN  03/18/2022        08:06      ENTEROCOCCUS FAECALIS    Narrative:      Aerobic and anaerobic          Impression:  Active Hospital Problems    Diagnosis  POA    *Pneumonia of right lower lobe due to infectious organism [J18.9]  Yes    Leaking PEG tube [K94.23]  Yes    Pressure injury of right elbow, stage 4 [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             .        Plan: t min 89, p 58 to 107, merrem/vanc, midodrine, lovenox 40/d,  28% ox,wbc 3.7, procal 0.04, u/a pos for blood and wbc, cxr min rll opacification-- ct abd and cxr 1/26 with rll density from apparent atelectasis.     Urine culture positive for proteus 1/5/2022 s to merrem.  Not clear if had c diff prophylaxsis recent admits but  c diff Pos May 2021.    I do not see clear pneumonia with cxr pattern and procal.  Would monitor on merrem.       H/o prior c diff -- consider prophylaxsis for c diff?  Monitor.    3/18 tm 100.6, p 124,   I/o 1503/1550  merrem and vanc  lovenox       U/a abn,procal low and cxr stable from Jan-- would rec urine culture if not done    Pt called pneumonia but findings are minimal  -- await cultures, adjust abx, return to [place] when stable.      3/19 tm 100.04, p to 119, merrem and vanc, rm air,   gnr sputum    Will recheck later, need sensitivities to determine dc abx.  3/21no fever, vss, no fever,    providentia and serratia in sputum, pseudomonas in urine <50k, elbow wound with gnr/staph with sensitivities pending.    merrem  adequate for providentia and serratia-- pseudomonas  and not significant at this time.  Continue vanc for now for elbow wound.    Today day 5 merrem, day 7 adequate to stop.    Above from Dr London and below from Dr Montesinos:    - continue vent support no weaning  - multiple infections are being addressed (pneumonia, wound, and UTI)- antibiotics per ID, getting meropenem + vanco with enteral vanc for c diff prophylaxis  - back off on airway clearance regimen- mucomyst w/ duo nebs q12h  - bowel regimen and management of feeds per GI  - lovenox for DVT prophylaxis  - midodrine continue      Dora Montesinos MD  Pulmonary & Critical Care Medicine

## 2022-03-23 NOTE — PLAN OF CARE
Patient's blood glucose wnl.  No coverage needed.  VSS.  Safety maintained.       Problem: Adult Inpatient Plan of Care  Goal: Optimal Comfort and Wellbeing  Outcome: Ongoing, Progressing     Problem: Nutrition Impaired (Sepsis/Septic Shock)  Goal: Optimal Nutrition Intake  Outcome: Ongoing, Progressing     Problem: Skin and Tissue Injury (Mechanical Ventilation, Invasive)  Goal: Absence of Device-Related Skin and Tissue Injury  Outcome: Ongoing, Progressing

## 2022-03-23 NOTE — CARE UPDATE
03/23/22 0644   Patient Assessment/Suction   Level of Consciousness (AVPU) responds to pain   Respiratory Effort Normal;Unlabored   Expansion/Accessory Muscles/Retractions expansion symmetric;no retractions;no use of accessory muscles   All Lung Fields Breath Sounds rhonchi   Rhythm/Pattern, Respiratory assisted mechanically   Cough Frequency with stimulation   Cough Type assisted   Suction Method oral;tracheal   $ Suction Charges Inline Suction Procedure Stat Charge   Secretions Amount small   Secretions Color yellow;pale   Secretions Characteristics thick   PRE-TX-O2   O2 Device (Oxygen Therapy) ventilator   $ Is the patient on Low Flow Oxygen? Yes   SpO2 100 %   Pulse Oximetry Type Continuous   $ Pulse Oximetry - Multiple Charge Pulse Oximetry - Multiple   Pulse 95   Resp (!) 27   ETCO2   $ ETCO2 Usage Currently wearing   ETCO2 Device Type Bedside Monitor;Ventilator;Artificial Airway   Aerosol Therapy   $ Aerosol Therapy Charges Aerosol Treatment   Daily Review of Necessity (SVN) completed   Respiratory Treatment Status (SVN) given   Treatment Route (SVN) in-line;ventilator   Patient Position (SVN) HOB elevated   Post Treatment Assessment (SVN) breath sounds unchanged   Signs of Intolerance (SVN) none   Breath Sounds Post-Respiratory Treatment   Throughout All Fields Post-Treatment All Fields   Throughout All Fields Post-Treatment no change   Post-treatment Heart Rate (beats/min) 98   Post-treatment Resp Rate (breaths/min) 27   Skin Integrity   $ Wound Care Tech Time 15 min   Area Observed Neck under tracheostomy   Skin Appearance without discoloration        Surgical Airway Portex Cuffed   No placement date or time found.   Present Prior to Hospital Arrival?: Yes  Brand: Portex  Airway Device Size: 8.0  Style: Cuffed   Cuff Pressure 34 cm H2O   Cuff Inflation? Inflated   Status Secured   Site Assessment Midline   Site Care Cleansed;Dried;Dressing applied   Ties Assessment Intact;Secure   General Safety  Checklist   Safety Promotion/Fall Prevention side rails raised   Airway Safety   Ambu bag with the patient? Yes, Adult Ambu   Is mask with the patient? Yes, Adult Mask   Suction set is at the bedside? Yes   Extra trach at bedside? No   Is obturator available? No   Airway Assistance   $ Trach Assist Charges Trach Assist;Tech time 15 min  (trach care done)   Vent Select   Conventional Vent Y   $ Ventilator Subsequent 1   Charged w/in last 24h YES   Preset Conventional Ventilator Settings   Vent Type [id number]-[id number]   Ventilation Type VC   Humidity HME   Patient Ventilator Parameters   Mean Airway Pressure 12 cmH20   Tubing ID (mm) 8 mm   Tube Type Trach   Conventional Ventilator Alarms   Alarms On Y   Ve High Alarm 14 L/min   Ve Low Alarm 3 L/min   Vt High Alarm 13 mL   Vt Low Alarm 5 mL   Resp Rate High Alarm 40 br/min   Press High Alarm 55 cmH2O   Delay Alarm (Sec) 30 sec(s)   IHI Ventilator Associated Pneumonia Bundle (Required)   Head of Bed Elevated  HOB 30   Oral Care Mouth swabbed;Lip moisturizer applied;Mouth moisturizer;Mouth suctioned;with half strength hydrogen peroxide

## 2022-03-23 NOTE — PROGRESS NOTES
Daily Progress Note  [facility]      Genna Felix (MRN: [id number])  Admitting Service: Hospital Medicine  Date of Admission: 3/17/2022   Primary Care Provider: Primary Doctor No    ?  Chief complaint: hypothermia   ?  History of Present Illness:         61F h/o PE, anoxic brain injury, now vent/trach-PEG feeding dependent also with ?cholecystostomy tube in place who is brought from [place] for hypothermia found to have a new pneumonia.       On the morning of admission trended found the patient with her warming blanket not functioning.  At that time she was found to be hypothermic leading to their transferring her here.    All information derived from ER team and [place] chart given patient's current cognitive state.       ?  ?  Subjective:     Unable to obtain given current cognitive state. Had a bowel movement after enema    PHYSICAL EXAM  Vitals: BP (!) 144/70   Pulse 100   Temp 99.68 °F (37.6 °C)   Resp (!) 32   Ht 5' (1.524 m)   Wt 70.8 kg (156 lb 1.4 oz)   SpO2 100%   BMI 30.48 kg/m²  Body mass index is 30.48 kg/m².    General: NAD, responds to pain   HEENT: upward gaze. Left sclera mildly erythematous   Cardiovascular: RRR  Respiratory: lungs CTAB  GI: abdomen S/NT/ND, +BS. ?cholecystostomy tube present   PEG tube present   Extremities: no edema  MSK: contractures present   Neuro/Psych: upward gaze. Contractures   Elbow: Pressure ulcer present       EKG and radiographs from the past 24h: reviewed and personally interpreted if available.      LABS    Recent Labs     03/23/22  0436   WBC 4.21   HGB 7.9*        ?  Recent Labs     03/23/22  0436      K 3.9   CO2 26   BUN 5*   CREATININE 0.4*   MG 1.8   PHOS 2.3*     ?  Recent Labs     03/23/22  0436   BILITOT 0.6   AST 35   ALT 60*   ALKPHOS 202*   PROT 7.2     ?  No results for input(s): INR, PT, PTT in the last 72 hours.  ?    ASSESSMENT & PLAN      61F h/o PE, anoxic brain injury, now vent/trach-PEG feeding dependent also with  ?cholecystostomy tube in place who is brought from [place] for hypothermia found to have a new pneumonia in addition to enterococcus bacteremia    1. Acute bacterial pneumonia 2/2 Serratia marcenscens and Providencia stuartii  -vanc/merrem  -warming blanket   -pulm consult    2. Enterococcus bacteremia   -TTE without vegetation   -BRENDAN requested. Likely to occur on 3/24  -Possible biliary source. Surgery consulted and recommended against surgery in the current context  -ID to determine duration of abx.         3.  UTI 2/2 pseudomonas aeruginosa  -Amikacin x1 on 3/21.    4. Pressure ulcer of right elbow.   -Bactrim  -ID/wound care consulted.       5. Severe sepsis 2/2 pneumonia 2/2 above organisms, UTI 2/2 above organisms, 2/2 enterococcus bacteremia leading to respiratory distress, hypotension, leukopenia with hepatic dysfunction 2/2 unknown organism.  Lactate reviewed. Abx as above. Fluids as needed        6. Tube feeds to be resume. PEG in correct position by imaging. GI assistance appreciated.  -GI consulted      7. Benzodiazepine dependence   -Given blood pressure and current cognitive state, holding home clonazepam 0.5 mg tid. Watch for withdrawal.   -no evidence of withdrawal. Still very somnolent. Continue to hold    8.  Chronic respiratory failure/ventilator dependent     9. CDiff ppx given history  -oral vanco    10 Constipation   -BM on 3/22 after enema  -scheduling doc-senna beginning 3/22    DVT ppx: lovenox       Code Status/Dispo: Full Code.   ?  Rajeev Stevenson    Date: 3/23/2022

## 2022-03-23 NOTE — PROGRESS NOTES
Pharmacokinetic Assessment Follow Up: IV Vancomycin    Vancomycin serum concentration assessment(s):    The trough level was drawn correctly and can be used to guide therapy at this time. The measurement is within the desired definitive target range of 15 to 20 mcg/mL.    Vancomycin Regimen Plan:    Continue regimen to Vancomycin 1250 mg IV every 18 hours with next serum trough concentration measured at 0930 prior to 3rd dose on 03/24/22    Drug levels (last 3 results):  Recent Labs   Lab Result Units 03/21/22  0925 03/22/22  2126   Vancomycin-Trough ug/mL 14.6 17.1       Pharmacy will continue to follow and monitor vancomycin.    Please contact pharmacy at extension [phone] for questions regarding this assessment.    Thank you for the consult,   Andrei Spencer       Patient brief summary:  Genna Felix is a 61 y.o. female initiated on antimicrobial therapy with IV Vancomycin for treatment of lower respiratory infection    The patient's current regimen is 1250mg q18h    Drug Allergies:   Review of patient's allergies indicates:  No Known Allergies    Actual Body Weight:   69.2kg    Renal Function:   Estimated Creatinine Clearance: 105.2 mL/min (based on SCr of 0.5 mg/dL).,     CBC (last 72 hours):  Recent Labs   Lab Result Units 03/21/22  0428 03/22/22  0340   WBC K/uL 4.22 3.69*   Hemoglobin g/dL 8.0* 7.8*   Hematocrit % 26.5* 25.6*   Platelets K/uL 282 310   Gran % % 59.5 53.0   Lymph % % 22.7 37.0   Mono % % 11.6 6.0   Eosinophil % % 5.5 3.0   Basophil % % 0.2 0.0   Differential Method  Automated Manual       Metabolic Panel (last 72 hours):  Recent Labs   Lab Result Units 03/20/22  0100 03/21/22  0428 03/22/22  0340   Sodium mmol/L  --  138 138   Potassium mmol/L  --  3.6 3.7   Chloride mmol/L  --  103 102   CO2 mmol/L  --  25 28   Glucose mg/dL  --  87 89   Glucose, UA  Negative  --   --    BUN mg/dL  --  5* 3*   Creatinine mg/dL  --  0.4* 0.5   Albumin g/dL  --  2.4* 2.4*   Total Bilirubin mg/dL  --  0.8 0.7    Alkaline Phosphatase U/L  --  203* 211*   AST U/L  --  57* 49*   ALT U/L  --  89* 82*   Magnesium mg/dL  --   --  1.7   Phosphorus mg/dL  --   --  2.1*       Vancomycin Administrations:  vancomycin given in the last 96 hours                     vancomycin 125 mg/5 mL oral solution 125 mg (mg) 125 mg Given 03/22/22 2134     125 mg Given  0940     125 mg Given  0030     125 mg Given 03/21/22 0816     125 mg Given 03/20/22 2001     125 mg Given  0956     125 mg Given 03/19/22 2134     125 mg Given  0921    vancomycin 1.25 g in dextrose 5% 250 mL IVPB (ready to mix) (mg) 1,250 mg New Bag 03/22/22 0508     1,250 mg New Bag 03/21/22 1018     1,250 mg New Bag 03/20/22 1628     1,250 mg New Bag 03/19/22 2240    vancomycin in dextrose 5 % 1 gram/250 mL IVPB 1,000 mg (mg) 1,000 mg New Bag 03/19/22 0348                    Microbiologic Results:  Microbiology Results (last 7 days)       Procedure Component Value Units Date/Time    Blood culture [[id number]] Collected: 03/21/22 1428    Order Status: Completed Specimen: Blood Updated: 03/22/22 2222     Blood Culture, Routine No Growth to date      No Growth to date    Blood culture x two cultures. Draw prior to antibiotics. [[id number]] Collected: 03/17/22 0854    Order Status: Completed Specimen: Blood from Peripheral, Right Hand Updated: 03/22/22 1412     Blood Culture, Routine No growth after 5 days.    Narrative:      Aerobic and anaerobic    Aerobic culture [[id number]]  (Abnormal)  (Susceptibility) Collected: 03/18/22 1630    Order Status: Completed Specimen: Wound from Elbow, Right Updated: 03/22/22 1103     Aerobic Bacterial Culture ACINETOBACTER BAUMANNII   Moderate        METHICILLIN RESISTANT STAPHYLOCOCCUS AUREUS  Many        PROTEUS MIRABILIS  Few      Urine Culture High Risk [[id number]]  (Abnormal)  (Susceptibility) Collected: 03/18/22 1010    Order Status: Completed Specimen: Urine, Catheterized Updated: 03/22/22 0923     Urine Culture, Routine PSEUDOMONAS  AERUGINOSA   10,000 - 49,999 cfu/ml      Narrative:      Indicated criteria for high risk culture:->Other  Other (specify):->sepsis    Urine culture [[id number]] Collected: 03/20/22 0100    Order Status: Completed Specimen: Urine Updated: 03/21/22 1152     Urine Culture, Routine No growth    Narrative:      Specimen Source->Urine    Culture, Respiratory with Gram Stain [[id number]]  (Abnormal)  (Susceptibility) Collected: 03/17/22 1337    Order Status: Completed Specimen: Respiratory from Tracheal Aspirate Updated: 03/21/22 0912     Respiratory Culture No S aureus or Pseudomonas isolated.      SERRATIA MARCESCENS  Many        PROVIDENCIA STUARTII  Many  Normal respiratory kenny also present       Gram Stain (Respiratory) Rare WBC's     Gram Stain (Respiratory) Many Gram positive cocci     Gram Stain (Respiratory) Many Gram negative rods    Blood culture x two cultures. Draw prior to antibiotics. [[id number]]  (Abnormal)  (Susceptibility) Collected: 03/17/22 0844    Order Status: Completed Specimen: Blood Updated: 03/20/22 1249     Blood Culture, Routine Gram stain aer bottle: Gram positive cocci in chains resembling Strep       Results called to and read back by: RASHEED LACEY RN  03/18/2022        08:06      ENTEROCOCCUS FAECALIS    Narrative:      Aerobic and anaerobic

## 2022-03-23 NOTE — PROGRESS NOTES
Progress Note  Infectious Disease    Reason for Consult: VAP/Enterococcus bacteremia/ Pseudomonas UTI    HPI: Genna Felix is a 61 y.o. female known to our service for prior hospital admissions last one 1/12/22 for acute cholecystitis s/p IR drainage 1/13, resident of [facility], with past medical history of anoxic brain injury status post tracheostomy/peg tube, prior PE, hypertension, and GERD, send from  for hypothermia. History very limited; patient is non-verbal at baseline, no family present.     In the R, patient with BP 96/55, tachycardic 107, temp 94.2  Labs significant for leukopenia 3.7, PMNL 72.3%, l: 16.9%  Cr 0.5, patient with muscle wasting   Albumin 3.1 , AST 54/ALT 98  Procal 0.04, negative   UA with 3+ protein, 1+ leukocyte esterase, RBC 50, WBC 5 - urine culture grew  Pseudomonas aeruginosa   CXR: RLL infiltrate     Admitted for sever sepsis multifactorial; VAP/UTi/R elbow decubitus ulcer.    Empirically started on Vancomycin, Meropenem, and PO vancomycin.     Hospital course complicated by Enterococcus faecalis bacteremia.   ID consult for antibiotic recommendations.     INTERVAL HISTORY:  3/22/22: Interim reviewed, patient seen and examined at bedside. Labile BP, hypothermia 95.7 overnight, currently normal temp. Micro reviewed, repeat blood cultures 3/21 x 1 no growth. Repeat urine culture no growth, s/p amikacin.  Wound cultures grew CRP Acinetobacter baumannii, MRSA and Proteus mirabils sensitive to meropenem.  CT scan abdomen/pelvis revealed bibasilar airspace disease. Multiple gallstones with cholecystostomy in place, gallbladder decompressed. Cystitis and diffuse rectal wall thickening s/o proctitis.     3/23: Patient seen and examined at bedside. Patient tachypneic, tachycardic, afebrile. Repeat blood cultures 3/21 no growth. Wound cultures grew CRP Acinetobacter baumannii, MRSA and Proteus mirabils sensitive to meropenem. Labs reviewed, WBC: 4.2,PMN: 53%, badns  4%    Review of patient's allergies indicates:  No Known Allergies  Past Medical History:   Diagnosis Date    Anoxic brain damage 07/2020    Bedbound 07/2020    Cardiac arrest 07/2020    Cirrhosis     GERD (gastroesophageal reflux disease)     Hemangioma of intra-abdominal structure     Hypertension     Nursing home resident     Protein calorie malnutrition     Pulmonary embolus     Respiratory distress     S/P percutaneous endoscopic gastrostomy (PEG) tube placement 07/2020    Total self-care deficit 07/2020    Tracheostomy dependence     7/2020     Past Surgical History:   Procedure Laterality Date    PEG W/TRACHEOSTOMY PLACEMENT  07/27/2020    SKIN GRAFT      buttocks     Social History     Tobacco Use    Smoking status: Never Smoker    Smokeless tobacco: Never Used   Substance Use Topics    Alcohol use: Not Currently        Family History   Problem Relation Age of Onset    No Known Problems Mother     No Known Problems Father        Pertinent medications noted:     Review of Systems:   Unable to obtain, patient is non-verbal, no family at bedside.     EXAM & DIAGNOSTICS REVIEWED:   Vitals:     Temp:  [97.16 °F (36.2 °C)-99.68 °F (37.6 °C)]   Temp: 99.68 °F (37.6 °C) (03/23/22 1212)  Pulse: 102 (03/23/22 1212)  Resp: (!) 29 (03/23/22 1212)  BP: 125/75 (03/23/22 1100)  SpO2: 100 % (03/23/22 1212)    Intake/Output Summary (Last 24 hours) at 3/23/2022 1450  Last data filed at 3/23/2022 1200  Gross per 24 hour   Intake 3248.37 ml   Output 1090 ml   Net 2158.37 ml     General:         Chronically ill-appearing, trach to vent FiO2 21%, in NAD.      Eyes:               Hyperemic conjunctiva both eyes, anicteric  ENT:                Very dry lower lip mucosa, desquamating skin noted, no oral thush Neck:              Supple  Lungs:            R base rhonchi, L mostly clear  Thick purulent secretions noted   Heart:              S1/S2+, regular rhythm, no murmurs  Abd:                RUQ CLARICE drain in  place since 1/13/22   bilious fluid                          PEG tube in place, no evidence of redness or purulent drainage from ostomy, +BS, soft, non tender, moderately distended  :                  Hamilton, urine clear   Musc:              Flexure contractures on hands  Skin:               Warm, no rash   Wound:             Neuro:  Not following commands  Psych:            unable to perform   Lymphatic:      Extrem:           No LE edema b/l - contracted  VAD:                Peripheral IV      PICC line R 3/17                                                        Isolation:  Contact    3/22:        3/18:      Right elbow     Left foot     1/4/22:       Sacrum                        Right hand    General Labs reviewed:  Recent Labs   Lab 03/21/22 0428 03/22/22  0340 03/23/22  0436   WBC 4.22 3.69* 4.21   HGB 8.0* 7.8* 7.9*   HCT 26.5* 25.6* 25.5*    310 304       Recent Labs   Lab 03/21/22 0428 03/22/22  0340 03/23/22  0436    138 139   K 3.6 3.7 3.9    102 105   CO2 25 28 26   BUN 5* 3* 5*   CREATININE 0.4* 0.5 0.4*   CALCIUM 9.2 9.3 8.8   PROT 7.3 7.2 7.2   BILITOT 0.8 0.7 0.6   ALKPHOS 203* 211* 202*   ALT 89* 82* 60*   AST 57* 49* 35     No results for input(s): CRP in the last 168 hours.  No results for input(s): SEDRATE in the last 168 hours.    Estimated Creatinine Clearance: 132.9 mL/min (A) (based on SCr of 0.4 mg/dL (L)).     Micro:  Microbiology Results (last 7 days)     Procedure Component Value Units Date/Time    Aerobic culture [[id number]]  (Abnormal)  (Susceptibility) Collected: 03/18/22 1630    Order Status: Completed Specimen: Wound from Elbow, Right Updated: 03/23/22 1427     Aerobic Bacterial Culture ACINETOBACTER BAUMANNII   Moderate        METHICILLIN RESISTANT STAPHYLOCOCCUS AUREUS  Many        PROTEUS MIRABILIS  Few      Blood culture [[id number]] Collected: 03/21/22 1428    Order Status: Completed Specimen: Blood Updated: 03/22/22 2222     Blood Culture, Routine No  Growth to date      No Growth to date    Blood culture x two cultures. Draw prior to antibiotics. [[id number]] Collected: 03/17/22 0854    Order Status: Completed Specimen: Blood from Peripheral, Right Hand Updated: 03/22/22 1412     Blood Culture, Routine No growth after 5 days.    Narrative:      Aerobic and anaerobic    Urine Culture High Risk [[id number]]  (Abnormal)  (Susceptibility) Collected: 03/18/22 1010    Order Status: Completed Specimen: Urine, Catheterized Updated: 03/22/22 0923     Urine Culture, Routine PSEUDOMONAS AERUGINOSA   10,000 - 49,999 cfu/ml      Narrative:      Indicated criteria for high risk culture:->Other  Other (specify):->sepsis    Urine culture [[id number]] Collected: 03/20/22 0100    Order Status: Completed Specimen: Urine Updated: 03/21/22 1152     Urine Culture, Routine No growth    Narrative:      Specimen Source->Urine    Culture, Respiratory with Gram Stain [[id number]]  (Abnormal)  (Susceptibility) Collected: 03/17/22 1337    Order Status: Completed Specimen: Respiratory from Tracheal Aspirate Updated: 03/21/22 0912     Respiratory Culture No S aureus or Pseudomonas isolated.      SERRATIA MARCESCENS  Many        PROVIDENCIA STUARTII  Many  Normal respiratory kenny also present       Gram Stain (Respiratory) Rare WBC's     Gram Stain (Respiratory) Many Gram positive cocci     Gram Stain (Respiratory) Many Gram negative rods    Blood culture x two cultures. Draw prior to antibiotics. [[id number]]  (Abnormal)  (Susceptibility) Collected: 03/17/22 0844    Order Status: Completed Specimen: Blood Updated: 03/20/22 1249     Blood Culture, Routine Gram stain aer bottle: Gram positive cocci in chains resembling Strep       Results called to and read back by: RASHEED LACEY RN  03/18/2022        08:06      ENTEROCOCCUS FAECALIS    Narrative:      Aerobic and anaerobic          Imaging Reviewed:  CXR: Right lower lobe pneumonia.  Tracheostomy tube in place.  Abdominal US: Cholelithiasis  in a contracted gallbladder.  Mild hydronephrosis of the right kidney.    KUB: Appropriately positioned PEG.     CT scan abdomen/pelvis 3/21:  1. Bibasilar airspace disease which could reflect atelectasis aspirate or pneumonia.  Trace bilateral pleural effusions.   2.  Multiple gallstones with cholecystostomy.  Gallbladder is decompressed.   3. Urinary bladder wall thickening could relate to inadequate distension.  Cystitis or outlet obstruction could also have this appearance.   4. Diffuse rectal wall thickening is presumably from under distension although proctitis possible in the appropriate clinical setting.     Cardiology: 3/21/22  · The left ventricle is normal in size with  · The estimated ejection fraction is 65%.  · Normal left ventricular diastolic function.  · Normal right ventricular size with normal right ventricular systolic function.  · There is mild pulmonary hypertension.  · Normal central venous pressure (3 mmHg).  · The estimated PA systolic pressure is 48 mmHg.  · On long-axis view there appears to be 1 cm echo artifact on the anterior leaf of the mitral valve left atrial side that is not seen on any other view-this probably represents mirror artifact  · Mild tricuspid regurgitation.      IMPRESSION & PLAN     1. Sepsis multifactorial; VAP/UTi/R elbow decubitus ulcer s/p Amikacin one dose 3/21   Procal 0.04   Blood cultures 3/17 Enterococcus faecalis - likely source gallbladder, Surgery recs appreciated - leave IR drain indefinitely    Urine culture 3/18 CRP Pseudomonas sensitive to Amikacin - repeat urine culture no growth   Respiratory culture 3/17 Serratia marcescens and Providencia stuartii   Wound cultures R elbow 3/18  Acinetobacter baumannii sensitive to TMP/SMX, MRSA, Proteus mirabilis sensitive to Meropenem.      2.  PMHx cardiac arrest, anoxic brain injury, bed-bound, cirrhosis, GERD, nursing home resident, PE, tracheostomy and PEG since July 2020.    Recommendations:  BRENDAN to rule  out vegetations   Continue Vancomycin IV, keep level around 15  Meropenem to 1g IV q8h    Bactrim DS 1 tab via PEG twice a day for 10-14 days for Acinetobacter  on R elbow  Trend CRP and procalcitonin   Artifical tears 4 times a day   Follow cultures   Aspiration precautions     Will follow    D/w nursing, Dr Stevenson      Medical Decision Making during this encounter was  [_] Low Complexity  [_] Moderate Complexity  [xx] High Complexity

## 2022-03-24 ENCOUNTER — ANESTHESIA (OUTPATIENT)
Dept: CARDIOLOGY | Facility: HOSPITAL | Age: 62
DRG: 870 | End: 2022-03-24
Payer: MEDICARE

## 2022-03-24 ENCOUNTER — ANESTHESIA EVENT (OUTPATIENT)
Dept: CARDIOLOGY | Facility: HOSPITAL | Age: 62
DRG: 870 | End: 2022-03-24
Payer: MEDICARE

## 2022-03-24 LAB
ALBUMIN SERPL BCP-MCNC: 2.4 G/DL (ref 3.5–5.2)
ALP SERPL-CCNC: 195 U/L (ref 55–135)
ALT SERPL W/O P-5'-P-CCNC: 50 U/L (ref 10–44)
ANION GAP SERPL CALC-SCNC: 9 MMOL/L (ref 8–16)
AST SERPL-CCNC: 23 U/L (ref 10–40)
BASOPHILS # BLD AUTO: 0.02 K/UL (ref 0–0.2)
BASOPHILS NFR BLD: 0.3 % (ref 0–1.9)
BILIRUB SERPL-MCNC: 0.6 MG/DL (ref 0.1–1)
BSA FOR ECHO PROCEDURE: 1.72 M2
BUN SERPL-MCNC: 6 MG/DL (ref 6–20)
CALCIUM SERPL-MCNC: 9.1 MG/DL (ref 8.7–10.5)
CHLORIDE SERPL-SCNC: 103 MMOL/L (ref 95–110)
CK SERPL-CCNC: 25 U/L (ref 20–180)
CO2 SERPL-SCNC: 26 MMOL/L (ref 23–29)
CREAT SERPL-MCNC: 0.5 MG/DL (ref 0.5–1.4)
CRP SERPL-MCNC: 64.4 MG/L (ref 0–8.2)
DIFFERENTIAL METHOD: ABNORMAL
EJECTION FRACTION: 60 %
EOSINOPHIL # BLD AUTO: 0.3 K/UL (ref 0–0.5)
EOSINOPHIL NFR BLD: 4.3 % (ref 0–8)
ERYTHROCYTE [DISTWIDTH] IN BLOOD BY AUTOMATED COUNT: 17.2 % (ref 11.5–14.5)
EST. GFR  (AFRICAN AMERICAN): >60 ML/MIN/1.73 M^2
EST. GFR  (NON AFRICAN AMERICAN): >60 ML/MIN/1.73 M^2
GLUCOSE SERPL-MCNC: 89 MG/DL (ref 70–110)
HCT VFR BLD AUTO: 27.1 % (ref 37–48.5)
HGB BLD-MCNC: 8.2 G/DL (ref 12–16)
IMM GRANULOCYTES # BLD AUTO: 0.04 K/UL (ref 0–0.04)
IMM GRANULOCYTES NFR BLD AUTO: 0.6 % (ref 0–0.5)
LYMPHOCYTES # BLD AUTO: 1.6 K/UL (ref 1–4.8)
LYMPHOCYTES NFR BLD: 21.6 % (ref 18–48)
MAGNESIUM SERPL-MCNC: 1.7 MG/DL (ref 1.6–2.6)
MCH RBC QN AUTO: 25.9 PG (ref 27–31)
MCHC RBC AUTO-ENTMCNC: 30.3 G/DL (ref 32–36)
MCV RBC AUTO: 86 FL (ref 82–98)
MONOCYTES # BLD AUTO: 0.6 K/UL (ref 0.3–1)
MONOCYTES NFR BLD: 8.9 % (ref 4–15)
NEUTROPHILS # BLD AUTO: 4.7 K/UL (ref 1.8–7.7)
NEUTROPHILS NFR BLD: 64.3 % (ref 38–73)
NRBC BLD-RTO: 0 /100 WBC
PHOSPHATE SERPL-MCNC: 2.5 MG/DL (ref 2.7–4.5)
PLATELET # BLD AUTO: 345 K/UL (ref 150–450)
PMV BLD AUTO: 10.1 FL (ref 9.2–12.9)
POCT GLUCOSE: 113 MG/DL (ref 70–110)
POCT GLUCOSE: 92 MG/DL (ref 70–110)
POTASSIUM SERPL-SCNC: 4 MMOL/L (ref 3.5–5.1)
PROCALCITONIN SERPL IA-MCNC: 0.12 NG/ML
PROT SERPL-MCNC: 7.5 G/DL (ref 6–8.4)
RBC # BLD AUTO: 3.16 M/UL (ref 4–5.4)
SODIUM SERPL-SCNC: 138 MMOL/L (ref 136–145)
VANCOMYCIN TROUGH SERPL-MCNC: 17.2 UG/ML (ref 10–22)
WBC # BLD AUTO: 7.22 K/UL (ref 3.9–12.7)

## 2022-03-24 PROCEDURE — 63600175 PHARM REV CODE 636 W HCPCS: Performed by: STUDENT IN AN ORGANIZED HEALTH CARE EDUCATION/TRAINING PROGRAM

## 2022-03-24 PROCEDURE — 27000207 HC ISOLATION

## 2022-03-24 PROCEDURE — 99233 PR SUBSEQUENT HOSPITAL CARE,LEVL III: ICD-10-PCS | Mod: S$GLB,,, | Performed by: STUDENT IN AN ORGANIZED HEALTH CARE EDUCATION/TRAINING PROGRAM

## 2022-03-24 PROCEDURE — 84100 ASSAY OF PHOSPHORUS: CPT | Performed by: INTERNAL MEDICINE

## 2022-03-24 PROCEDURE — 27000221 HC OXYGEN, UP TO 24 HOURS

## 2022-03-24 PROCEDURE — 25000003 PHARM REV CODE 250: Performed by: NURSE ANESTHETIST, CERTIFIED REGISTERED

## 2022-03-24 PROCEDURE — 99233 PR SUBSEQUENT HOSPITAL CARE,LEVL III: ICD-10-PCS | Mod: ,,, | Performed by: INTERNAL MEDICINE

## 2022-03-24 PROCEDURE — D9220A PRA ANESTHESIA: ICD-10-PCS | Mod: ANES,,, | Performed by: ANESTHESIOLOGY

## 2022-03-24 PROCEDURE — 80202 ASSAY OF VANCOMYCIN: CPT | Performed by: INTERNAL MEDICINE

## 2022-03-24 PROCEDURE — 84145 PROCALCITONIN (PCT): CPT | Performed by: STUDENT IN AN ORGANIZED HEALTH CARE EDUCATION/TRAINING PROGRAM

## 2022-03-24 PROCEDURE — 83735 ASSAY OF MAGNESIUM: CPT | Performed by: INTERNAL MEDICINE

## 2022-03-24 PROCEDURE — 99233 SBSQ HOSP IP/OBS HIGH 50: CPT | Mod: S$GLB,,, | Performed by: STUDENT IN AN ORGANIZED HEALTH CARE EDUCATION/TRAINING PROGRAM

## 2022-03-24 PROCEDURE — 25000003 PHARM REV CODE 250: Performed by: INTERNAL MEDICINE

## 2022-03-24 PROCEDURE — 25000242 PHARM REV CODE 250 ALT 637 W/ HCPCS: Performed by: INTERNAL MEDICINE

## 2022-03-24 PROCEDURE — 63600175 PHARM REV CODE 636 W HCPCS: Performed by: HOSPITALIST

## 2022-03-24 PROCEDURE — 80053 COMPREHEN METABOLIC PANEL: CPT | Performed by: INTERNAL MEDICINE

## 2022-03-24 PROCEDURE — D9220A PRA ANESTHESIA: ICD-10-PCS | Mod: CRNA,,, | Performed by: NURSE ANESTHETIST, CERTIFIED REGISTERED

## 2022-03-24 PROCEDURE — D9220A PRA ANESTHESIA: Mod: CRNA,,, | Performed by: NURSE ANESTHETIST, CERTIFIED REGISTERED

## 2022-03-24 PROCEDURE — 25000003 PHARM REV CODE 250: Performed by: HOSPITALIST

## 2022-03-24 PROCEDURE — 82550 ASSAY OF CK (CPK): CPT | Performed by: STUDENT IN AN ORGANIZED HEALTH CARE EDUCATION/TRAINING PROGRAM

## 2022-03-24 PROCEDURE — 94003 VENT MGMT INPAT SUBQ DAY: CPT

## 2022-03-24 PROCEDURE — 94640 AIRWAY INHALATION TREATMENT: CPT

## 2022-03-24 PROCEDURE — 25000003 PHARM REV CODE 250: Performed by: STUDENT IN AN ORGANIZED HEALTH CARE EDUCATION/TRAINING PROGRAM

## 2022-03-24 PROCEDURE — 99900035 HC TECH TIME PER 15 MIN (STAT)

## 2022-03-24 PROCEDURE — 99233 SBSQ HOSP IP/OBS HIGH 50: CPT | Mod: ,,, | Performed by: INTERNAL MEDICINE

## 2022-03-24 PROCEDURE — 94761 N-INVAS EAR/PLS OXIMETRY MLT: CPT

## 2022-03-24 PROCEDURE — 36415 COLL VENOUS BLD VENIPUNCTURE: CPT | Performed by: STUDENT IN AN ORGANIZED HEALTH CARE EDUCATION/TRAINING PROGRAM

## 2022-03-24 PROCEDURE — 63600175 PHARM REV CODE 636 W HCPCS: Performed by: NURSE ANESTHETIST, CERTIFIED REGISTERED

## 2022-03-24 PROCEDURE — D9220A PRA ANESTHESIA: Mod: ANES,,, | Performed by: ANESTHESIOLOGY

## 2022-03-24 PROCEDURE — 63600175 PHARM REV CODE 636 W HCPCS: Performed by: INTERNAL MEDICINE

## 2022-03-24 PROCEDURE — 99900026 HC AIRWAY MAINTENANCE (STAT)

## 2022-03-24 PROCEDURE — 20000000 HC ICU ROOM

## 2022-03-24 PROCEDURE — 86140 C-REACTIVE PROTEIN: CPT | Performed by: STUDENT IN AN ORGANIZED HEALTH CARE EDUCATION/TRAINING PROGRAM

## 2022-03-24 PROCEDURE — A4216 STERILE WATER/SALINE, 10 ML: HCPCS | Performed by: INTERNAL MEDICINE

## 2022-03-24 PROCEDURE — 85025 COMPLETE CBC W/AUTO DIFF WBC: CPT | Performed by: INTERNAL MEDICINE

## 2022-03-24 RX ORDER — SODIUM,POTASSIUM PHOSPHATES 280-250MG
2 POWDER IN PACKET (EA) ORAL NIGHTLY
Status: COMPLETED | OUTPATIENT
Start: 2022-03-24 | End: 2022-03-24

## 2022-03-24 RX ORDER — PROPOFOL 10 MG/ML
VIAL (ML) INTRAVENOUS
Status: DISCONTINUED | OUTPATIENT
Start: 2022-03-24 | End: 2022-03-24

## 2022-03-24 RX ORDER — LIDOCAINE HYDROCHLORIDE 20 MG/ML
INJECTION, SOLUTION EPIDURAL; INFILTRATION; INTRACAUDAL; PERINEURAL
Status: DISCONTINUED
Start: 2022-03-24 | End: 2022-03-26 | Stop reason: HOSPADM

## 2022-03-24 RX ORDER — LIDOCAINE HCL/PF 100 MG/5ML
SYRINGE (ML) INTRAVENOUS
Status: DISCONTINUED | OUTPATIENT
Start: 2022-03-24 | End: 2022-03-24

## 2022-03-24 RX ORDER — PROPOFOL 10 MG/ML
INJECTION, EMULSION INTRAVENOUS
Status: COMPLETED
Start: 2022-03-24 | End: 2022-03-24

## 2022-03-24 RX ADMIN — IPRATROPIUM BROMIDE AND ALBUTEROL SULFATE 3 ML: 2.5; .5 SOLUTION RESPIRATORY (INHALATION) at 07:03

## 2022-03-24 RX ADMIN — MEROPENEM AND SODIUM CHLORIDE 1 G: 1 INJECTION, SOLUTION INTRAVENOUS at 05:03

## 2022-03-24 RX ADMIN — Medication 125 MG: at 08:03

## 2022-03-24 RX ADMIN — MEROPENEM AND SODIUM CHLORIDE 1 G: 1 INJECTION, SOLUTION INTRAVENOUS at 09:03

## 2022-03-24 RX ADMIN — ENOXAPARIN SODIUM 40 MG: 40 INJECTION SUBCUTANEOUS at 05:03

## 2022-03-24 RX ADMIN — DAPTOMYCIN 420 MG: 350 INJECTION, POWDER, LYOPHILIZED, FOR SOLUTION INTRAVENOUS at 05:03

## 2022-03-24 RX ADMIN — SULFAMETHOXAZOLE AND TRIMETHOPRIM 1 TABLET: 800; 160 TABLET ORAL at 09:03

## 2022-03-24 RX ADMIN — ACETYLCYSTEINE 4 ML: 100 SOLUTION ORAL; RESPIRATORY (INHALATION) at 08:03

## 2022-03-24 RX ADMIN — MIDODRINE HYDROCHLORIDE 10 MG: 5 TABLET ORAL at 03:03

## 2022-03-24 RX ADMIN — HYPROMELLOSE 2910 1 DROP: 5 SOLUTION OPHTHALMIC at 05:03

## 2022-03-24 RX ADMIN — SULFAMETHOXAZOLE AND TRIMETHOPRIM 1 TABLET: 800; 160 TABLET ORAL at 08:03

## 2022-03-24 RX ADMIN — MEROPENEM AND SODIUM CHLORIDE 1 G: 1 INJECTION, SOLUTION INTRAVENOUS at 12:03

## 2022-03-24 RX ADMIN — SODIUM CHLORIDE, PRESERVATIVE FREE 10 ML: 5 INJECTION INTRAVENOUS at 05:03

## 2022-03-24 RX ADMIN — DOCUSATE SODIUM AND SENNOSIDES 1 TABLET: 8.6; 5 TABLET, FILM COATED ORAL at 08:03

## 2022-03-24 RX ADMIN — COLLAGENASE SANTYL: 250 OINTMENT TOPICAL at 08:03

## 2022-03-24 RX ADMIN — POTASSIUM & SODIUM PHOSPHATES POWDER PACK 280-160-250 MG 2 PACKET: 280-160-250 PACK at 09:03

## 2022-03-24 RX ADMIN — HYPROMELLOSE 2910 1 DROP: 5 SOLUTION OPHTHALMIC at 12:03

## 2022-03-24 RX ADMIN — SODIUM CHLORIDE, PRESERVATIVE FREE 10 ML: 5 INJECTION INTRAVENOUS at 06:03

## 2022-03-24 RX ADMIN — ACETYLCYSTEINE 4 ML: 100 SOLUTION ORAL; RESPIRATORY (INHALATION) at 07:03

## 2022-03-24 RX ADMIN — IPRATROPIUM BROMIDE AND ALBUTEROL SULFATE 3 ML: 2.5; .5 SOLUTION RESPIRATORY (INHALATION) at 08:03

## 2022-03-24 RX ADMIN — Medication 125 MG: at 09:03

## 2022-03-24 RX ADMIN — SODIUM CHLORIDE, PRESERVATIVE FREE 10 ML: 5 INJECTION INTRAVENOUS at 12:03

## 2022-03-24 RX ADMIN — MINERAL SUPPLEMENT IRON 300 MG / 5 ML STRENGTH LIQUID 100 PER BOX UNFLAVORED 300 MG: at 08:03

## 2022-03-24 RX ADMIN — MIDODRINE HYDROCHLORIDE 10 MG: 5 TABLET ORAL at 09:03

## 2022-03-24 RX ADMIN — VANCOMYCIN HYDROCHLORIDE 1250 MG: 1.25 INJECTION, POWDER, LYOPHILIZED, FOR SOLUTION INTRAVENOUS at 10:03

## 2022-03-24 RX ADMIN — DEXTROSE AND SODIUM CHLORIDE: 5; .9 INJECTION, SOLUTION INTRAVENOUS at 12:03

## 2022-03-24 RX ADMIN — HYPROMELLOSE 2910 1 DROP: 5 SOLUTION OPHTHALMIC at 08:03

## 2022-03-24 RX ADMIN — LIDOCAINE HYDROCHLORIDE 100 MG: 20 INJECTION INTRAVENOUS at 01:03

## 2022-03-24 RX ADMIN — HYPROMELLOSE 2910 1 DROP: 5 SOLUTION OPHTHALMIC at 09:03

## 2022-03-24 RX ADMIN — PROPOFOL 40 MG: 10 INJECTION, EMULSION INTRAVENOUS at 01:03

## 2022-03-24 NOTE — CARE UPDATE
03/23/22 1918   Patient Assessment/Suction   Level of Consciousness (AVPU) responds to voice   Respiratory Effort Normal;Unlabored   Expansion/Accessory Muscles/Retractions expansion symmetric;no use of accessory muscles   All Lung Fields Breath Sounds coarse;rhonchi   Rhythm/Pattern, Respiratory assisted mechanically   Cough Frequency with stimulation   Cough Type assisted   Suction Method oral;tracheal   $ Suction Charges Inline Suction Procedure Stat Charge   Secretions Amount moderate   Secretions Color yellow   Secretions Characteristics thick   PRE-TX-O2   O2 Device (Oxygen Therapy) ventilator   $ Is the patient on Low Flow Oxygen? Yes   Oxygen Concentration (%) 21   SpO2 100 %   Pulse Oximetry Type Continuous   $ Pulse Oximetry - Multiple Charge Pulse Oximetry - Multiple   Pulse 99   Resp (!) 32   Temp 99.86 °F (37.7 °C)   ETCO2   $ ETCO2 Usage Currently wearing   ETCO2 (mmHg) 34 mmHg   ETCO2 Device Type Bedside Monitor;Ventilator;Artificial Airway   Aerosol Therapy   $ Aerosol Therapy Charges Aerosol Treatment   Respiratory Treatment Status (SVN) given   Treatment Route (SVN) in-line;ventilator   Patient Position (SVN) HOB elevated   Skin Integrity   $ Wound Care Tech Time 15 min   Area Observed Neck under tracheostomy   Skin Appearance without discoloration   Barrier used? Other (see comments)  (drain sponge)        Surgical Airway Portex Cuffed   No placement date or time found.   Present Prior to Hospital Arrival?: Yes  Brand: Portex  Airway Device Size: 8.0  Style: Cuffed   Cuff Pressure 34 cm H2O   Cuff Inflation? Inflated   Speaking Valve Usage Not wearing   Status Secured   Site Assessment Clean;Dry;Midline   Airway Safety   Ambu bag with the patient? Yes, Adult Ambu   Is mask with the patient? Yes, Adult Mask   Vent Select   Conventional Vent Y   Charged w/in last 24h YES   Preset Conventional Ventilator Settings   Vent Type NKV-550   Ventilation Type VC   Vent Mode A/CMV-VC   Humidity HME   Set  Rate 14 BPM   Vt Set 400 mL   PEEP/CPAP 5.9 cmH20   Waveform RAMP   I-Trigger Type  Flow   Trigger Sensitivity Flow/I-Trigger 2.5 L/min   Patient Ventilator Parameters   Resp Rate Total 31 br/min   Peak Airway Pressure 57.2 cmH2O   Mean Airway Pressure 16.5 cmH20   Plateau Pressure 34.5 cmH20   Exhaled Vt 261 mL   Total Ve 9.58 mL   I:E Ratio Measured 1:1.1   Total PEEP 6.8 cmH20   Tubing ID (mm) 8 mm   Tube Type Trach   Inspired Tidal Volume (VTI) 279 mL   Conventional Ventilator Alarms   Alarms On Y   Ve High Alarm 14 L/min   Ve Low Alarm 3 L/min   Vt High Alarm 13 mL   Vt Low Alarm 5 mL   Resp Rate High Alarm 40 br/min   Press High Alarm 55 cmH2O   Apnea Rate 14   Apnea Volume (mL) 400 mL   Delay Alarm (Sec) 30 sec(s)   Ready to Wean/Extubation Screen   FIO2<=50 (chart decimal) 0.21   MV<16L (chart vol.) 9.58   PEEP <=8 (chart #) 5.9   Ready to Wean Parameters   F/VT Ratio<105 (RSBI) 122.61

## 2022-03-24 NOTE — PROGRESS NOTES
Daily Progress Note  [facility]      Genna Felix (MRN: [id number])  Admitting Service: Hospital Medicine  Date of Admission: 3/17/2022   Primary Care Provider: Primary Doctor No    ?  Chief complaint: hypothermia   ?  History of Present Illness:         61F h/o PE, anoxic brain injury, now vent/trach-PEG feeding dependent also with ?cholecystostomy tube in place who is brought from [place] for hypothermia found to have a new pneumonia.       On the morning of admission trended found the patient with her warming blanket not functioning.  At that time she was found to be hypothermic leading to their transferring her here.    All information derived from ER team and [place] chart given patient's current cognitive state.       ?  ?  Subjective:     Unable to obtain given current cognitive state. Had a bowel movement after enema    PHYSICAL EXAM  Vitals: /68   Pulse 84   Temp 96.8 °F (36 °C)   Resp (!) 32   Ht 5' (1.524 m)   Wt 70 kg (154 lb 5.2 oz)   SpO2 97%   BMI 30.14 kg/m²  Body mass index is 30.14 kg/m².    General: NAD, responds to pain   HEENT: upward gaze. Left sclera mildly erythematous   Cardiovascular: RRR  Respiratory: lungs CTAB  GI: abdomen S/NT/ND, +BS. ?cholecystostomy tube present   PEG tube present   Extremities: no edema  MSK: contractures present   Neuro/Psych: upward gaze. Contractures   Elbow: Pressure ulcer present       EKG and radiographs from the past 24h: reviewed and personally interpreted if available.      LABS    Recent Labs     03/24/22  0325   WBC 7.22   HGB 8.2*        ?  Recent Labs     03/24/22  0325      K 4.0   CO2 26   BUN 6   CREATININE 0.5   MG 1.7   PHOS 2.5*     ?  Recent Labs     03/24/22  0325   BILITOT 0.6   AST 23   ALT 50*   ALKPHOS 195*   PROT 7.5     ?  No results for input(s): INR, PT, PTT in the last 72 hours.  ?    ASSESSMENT & PLAN      61F h/o PE, anoxic brain injury, now vent/trach-PEG feeding dependent also with cholecystostomy  tube in place who is brought from [place] for hypothermia found to have a new pneumonia in addition to enterococcus bacteremia believed to be from a biliary source/related to cholecystostomy tube and an infected pressure ulcer of the elbow.     From admission the patient was treated with vancomycin and Merrem for her pneumonia and Enterococcus bacteremia.  It is believed the Enterococcus bacteremia resulted from a biliary source in the setting of the presence of a cholecystostomy tube.  General surgery was consulted and recommended against surgical intervention.   ID has recommended transition of vancomycin to daptomycin on 03/24.  The patient is to complete 2 total weeks of therapy including both the time on vancomycin and daptomycin with Merrem (through 3/31).  Cholecystostomy tube exchanges not been recommended unless patient decompensates or again becomes infected.  Of note, BRENDAN also occurred which did not demonstrate a vegetation.    Regarding the pneumonia the patient has been fully treated as of 03/25/2022 and is on minimal vent settings.    Regarding the infected pressure ulcer of the elbow, Bactrim was initiated on 03/24/2022.  Infectious Disease has recommended that this be continued for 2 weeks which is through 4/6.    Patient also UTI which she received 1 dose of amikacin for.         1. Acute bacterial pneumonia 2/2 Serratia marcenscens and Providencia stuartii  -vanc initially, ultimately transitioned to dapto in addition to merrem  -warming blanket   -treatment course will be completed prior to end of therapy for the enterococcus bacteremia  -pulm consulted    2. Enterococcus bacteremia   -TTE and BRENDAN without vegetation   -Possible biliary source given the presence of the cholecystostomy tube. Surgery consulted and recommended against surgery in the current context. ID has thus recommended full course of therapy without exchange of cholecystostomy unless patient declines or has a recurrent  infection  -ID has recommended two total weeks of vancomycin ultimately transitioned to daptomycin therapy (to continue through 3/31)         3. Pressure ulcer of right elbow, infected 2/2 acinetobacter baumannii, Staphylococcous aureus, Proteus mirabilis    -Bactrim for two weeks (through 4/6)  -ID/wound care consulted.     3.  UTI 2/2 pseudomonas aeruginosa  -Amikacin x1 on 3/21.  -Resolved       5. Severe sepsis 2/2 pneumonia 2/2 above organisms, UTI 2/2 above organisms, infected pressure ulcer of elbow secondary to above organisms, 2/2 enterococcus bacteremia leading to respiratory distress, hypotension, leukopenia with hepatic dysfunction 2/2 unknown organism.  Lactate reviewed. Abx as above. Fluids as needed        6. Tube feeds to be resumed following BRENDAN. PEG in correct position by imaging. GI assistance appreciated.  -GI consulted      7. Benzodiazepine dependence   -Given blood pressure and current cognitive state, holding home clonazepam 0.5 mg tid. Watch for withdrawal.   -no evidence of withdrawal. Will discontinue. Did not require during the admission.     8.  Chronic respiratory failure/ventilator dependent     9. CDiff ppx given history  -oral vanco to be continued through 4/13.    10 Constipation   -BM on 3/22 after enema  -scheduling doc-senna beginning 3/22    DVT ppx: lovenox       Code Status/Dispo: Full Code.   ?  Rajeev Stevenson    Date: 3/24/2022

## 2022-03-24 NOTE — PLAN OF CARE
Per MDR's pt will have a BRENDAN at bedside at 1:00 pm today and will be ready to return to [place] possibly on Friday. CM following.    03/24/22 1144   Discharge Assessment   Assessment Type Discharge Planning Reassessment

## 2022-03-24 NOTE — PLAN OF CARE
Problem: Adult Inpatient Plan of Care  Goal: Plan of Care Review  Outcome: Ongoing, Progressing  Goal: Patient-Specific Goal (Individualized)  Outcome: Ongoing, Progressing  Goal: Absence of Hospital-Acquired Illness or Injury  Outcome: Ongoing, Progressing  Goal: Optimal Comfort and Wellbeing  Outcome: Ongoing, Progressing  Goal: Readiness for Transition of Care  Outcome: Ongoing, Progressing     Problem: Aspiration (Enteral Nutrition)  Goal: Absence of Aspiration Signs and Symptoms  Outcome: Ongoing, Progressing     Problem: Device-Related Complication Risk (Enteral Nutrition)  Goal: Safe, Effective Therapy Delivery  Outcome: Ongoing, Progressing     Problem: Feeding Intolerance (Enteral Nutrition)  Goal: Feeding Tolerance  Outcome: Ongoing, Progressing     Problem: Skin Injury Risk Increased  Goal: Skin Health and Integrity  Outcome: Ongoing, Progressing     Problem: Adjustment to Illness (Sepsis/Septic Shock)  Goal: Optimal Coping  Outcome: Ongoing, Progressing     Problem: Glycemic Control Impaired (Sepsis/Septic Shock)  Goal: Blood Glucose Level Within Desired Range  Outcome: Ongoing, Progressing     Problem: Infection Progression (Sepsis/Septic Shock)  Goal: Absence of Infection Signs and Symptoms  Outcome: Ongoing, Progressing    Plan for BRENDAN today and return to [facility] tomorrow if all goes well

## 2022-03-24 NOTE — CARE UPDATE
Hamilton noted to be leaking, pads saturated with urine but UOP noted in catheter tubing, 1400cc this shift. Balloon inflated. Will continue to monitor,     TF resumed as ordered. IVF stopped.     IV antibiotics changed as ordered. Afebrile.

## 2022-03-24 NOTE — CARE UPDATE
03/24/22 0706   Patient Assessment/Suction   Level of Consciousness (AVPU) responds to pain   Respiratory Effort Normal;Unlabored   Expansion/Accessory Muscles/Retractions no use of accessory muscles;no retractions;expansion symmetric   All Lung Fields Breath Sounds coarse   Rhythm/Pattern, Respiratory assisted mechanically   Cough Frequency with stimulation   Cough Type assisted   Suction Method oral;tracheal   $ Suction Charges Inline Suction Procedure Stat Charge   Secretions Amount moderate   Secretions Color white;yellow   Secretions Characteristics thick   PRE-TX-O2   O2 Device (Oxygen Therapy) ventilator   $ Is the patient on Low Flow Oxygen? Yes   Oxygen Concentration (%) 21   SpO2 98 %   Pulse Oximetry Type Continuous   $ Pulse Oximetry - Multiple Charge Pulse Oximetry - Multiple   Pulse 88   Resp 16   Temp 98.24 °F (36.8 °C)   ETCO2   $ ETCO2 Usage Currently wearing   ETCO2 (mmHg) 33 mmHg   ETCO2 Device Type Bedside Monitor;Artificial Airway   Aerosol Therapy   $ Aerosol Therapy Charges Aerosol Treatment   Daily Review of Necessity (SVN) completed   Respiratory Treatment Status (SVN) given   Treatment Route (SVN) in-line   Patient Position (SVN) HOB elevated   Post Treatment Assessment (SVN) breath sounds unchanged   Signs of Intolerance (SVN) none   Breath Sounds Post-Respiratory Treatment   Throughout All Fields Post-Treatment All Fields   Throughout All Fields Post-Treatment no change   Post-treatment Resp Rate (breaths/min) 16   Skin Integrity   $ Wound Care Tech Time 15 min   Area Observed Neck under tracheostomy   Skin Appearance without discoloration   Barrier used? Other (see comments)  (drain sponge)        Surgical Airway Portex Cuffed   No placement date or time found.   Present Prior to Hospital Arrival?: Yes  Brand: Portex  Airway Device Size: 8.0  Style: Cuffed   Cuff Pressure 34 cm H2O   Cuff Inflation? Inflated   Speaking Valve Usage Not wearing   Status Secured   Site Assessment  Clean;Dry;No bleeding;No drainage   Site Care Cleansed;Dried;Dressing applied   Ties Assessment Clean;Dry;Intact;Secure   Airway Safety   Ambu bag with the patient? Yes, Adult Ambu   Is mask with the patient? Yes, Adult Mask   Vent Select   Conventional Vent Y   $ Ventilator Subsequent 1   Charged w/in last 24h YES   Preset Conventional Ventilator Settings   Vent Type NKV-550   Ventilation Type VC   Vent Mode A/CMV-VC   Humidity HME   Set Rate 14 BPM   Vt Set 400 mL   PEEP/CPAP 5.2 cmH20   Waveform RAMP   Patient Ventilator Parameters   Resp Rate Total 16 br/min   Peak Airway Pressure 35.9 cmH2O   Mean Airway Pressure 18.1 cmH20   Plateau Pressure 28.2 cmH20   Exhaled Vt 396 mL   Total Ve 6.32 mL   I:E Ratio Measured 1:2.6   Total PEEP 5.2 cmH20   Tube Type Trach   Inspired Tidal Volume (VTI) 415 mL   Conventional Ventilator Alarms   Alarms On Y   Ve High Alarm 14 L/min   Ve Low Alarm 3 L/min   Vt High Alarm 13 mL   Vt Low Alarm 5 mL   Resp Rate High Alarm 40 br/min   Press High Alarm 55 cmH2O   Apnea Rate 14   Apnea Volume (mL) 400 mL   Delay Alarm (Sec) 30 sec(s)   Ready to Wean/Extubation Screen   FIO2<=50 (chart decimal) 0.21   MV<16L (chart vol.) 6.32   PEEP <=8 (chart #) 5.2   Ready to Wean Parameters   F/VT Ratio<105 (RSBI) (!) 40.4       Patient maintained on ventilator at documented settings. All alarms on and functioning properly. Aerosol treatments BID with [place] and Mucomyst.

## 2022-03-24 NOTE — NURSING
Dr. Last and cardiology tech at  to perform BRENDAN. Informed consent obtained from pt's daughter Starr Felix

## 2022-03-24 NOTE — ANESTHESIA PREPROCEDURE EVALUATION
03/24/2022  Genna Felix is a 61 y.o., female.      Pre-op Assessment    I have reviewed the Patient Summary Reports.     I have reviewed the Nursing Notes. I have reviewed the NPO Status.   I have reviewed the Medications.     Review of Systems  Cardiovascular:   Hypertension    Musculoskeletal:   Arthritis     Neurological:  Head Injury Anoxic brain damage    Persistent vegetative state       Physical Exam  General: Well nourished, Cooperative, Alert and Oriented    Airway:  Mallampati: II / II  Mouth Opening: Normal  TM Distance: 4 - 6 cm  Tongue: Normal    Dental:  Intact    Chest/Lungs:  Clear to auscultation, Normal Respiratory Rate    Heart:  Rate: Normal  Rhythm: Regular Rhythm  Sounds: Normal        Anesthesia Plan  Type of Anesthesia, risks & benefits discussed:    Anesthesia Type: MAC  Intra-op Monitoring Plan: Standard ASA Monitors  Induction:  IV  Informed Consent: Informed consent signed with the Patient and all parties understand the risks and agree with anesthesia plan.  All questions answered.   ASA Score: 5 Emergent    Ready For Surgery From Anesthesia Perspective.     .

## 2022-03-24 NOTE — PROGRESS NOTES
Progress Note  Infectious Disease    Reason for Consult: VAP/Enterococcus bacteremia/ Pseudomonas UTI    HPI: Genna Felix is a 61 y.o. female known to our service for prior hospital admissions last one 1/12/22 for acute cholecystitis s/p IR drainage 1/13, resident of [facility], with past medical history of anoxic brain injury status post tracheostomy/peg tube, prior PE, hypertension, and GERD, send from  for hypothermia. History very limited; patient is non-verbal at baseline, no family present.     In the R, patient with BP 96/55, tachycardic 107, temp 94.2  Labs significant for leukopenia 3.7, PMNL 72.3%, l: 16.9%  Cr 0.5, patient with muscle wasting   Albumin 3.1 , AST 54/ALT 98  Procal 0.04, negative   UA with 3+ protein, 1+ leukocyte esterase, RBC 50, WBC 5 - urine culture grew  Pseudomonas aeruginosa   CXR: RLL infiltrate     Admitted for sever sepsis multifactorial; VAP/UTi/R elbow decubitus ulcer.    Empirically started on Vancomycin, Meropenem, and PO vancomycin.     Hospital course complicated by Enterococcus faecalis bacteremia.   ID consult for antibiotic recommendations.     INTERVAL HISTORY:  3/22/22: Interim reviewed, patient seen and examined at bedside. Labile BP, hypothermia 95.7 overnight, currently normal temp. Micro reviewed, repeat blood cultures 3/21 x 1 no growth. Repeat urine culture no growth, s/p amikacin.  Wound cultures grew CRP Acinetobacter baumannii, MRSA and Proteus mirabils sensitive to meropenem.  CT scan abdomen/pelvis revealed bibasilar airspace disease. Multiple gallstones with cholecystostomy in place, gallbladder decompressed. Cystitis and diffuse rectal wall thickening s/o proctitis.     3/23: Patient seen and examined at bedside. Patient tachypneic, tachycardic, afebrile. Repeat blood cultures 3/21 no growth. Wound cultures grew CRP Acinetobacter baumannii, MRSA and Proteus mirabils sensitive to meropenem. Labs reviewed, WBC: 4.2,PMN: 53%, bands  4%.    3/24: Interim reviewed, patient seen and examined at bedside.  Repeat blood cultures 3/21 x1 no growth. BRENDAN no evidence of vegetations. Labs reviewed, WBC stable, no bands. Procal 0.12, normal.     Review of patient's allergies indicates:  No Known Allergies  Past Medical History:   Diagnosis Date    Anoxic brain damage 07/2020    Bedbound 07/2020    Cardiac arrest 07/2020    Cirrhosis     GERD (gastroesophageal reflux disease)     Hemangioma of intra-abdominal structure     Hypertension     Nursing home resident     Protein calorie malnutrition     Pulmonary embolus     Respiratory distress     S/P percutaneous endoscopic gastrostomy (PEG) tube placement 07/2020    Total self-care deficit 07/2020    Tracheostomy dependence     7/2020     Past Surgical History:   Procedure Laterality Date    PEG W/TRACHEOSTOMY PLACEMENT  07/27/2020    SKIN GRAFT      buttocks     Social History     Tobacco Use    Smoking status: Never Smoker    Smokeless tobacco: Never Used   Substance Use Topics    Alcohol use: Not Currently        Family History   Problem Relation Age of Onset    No Known Problems Mother     No Known Problems Father        Pertinent medications noted:     Review of Systems:   Unable to obtain, patient is non-verbal, no family at bedside.     EXAM & DIAGNOSTICS REVIEWED:   Vitals:     Temp:  [96.8 °F (36 °C)-99.86 °F (37.7 °C)]   Temp: 96.8 °F (36 °C) (03/24/22 1122)  Pulse: 84 (03/24/22 1122)  Resp: (!) 32 (03/24/22 1122)  BP: (!) 169/92 (03/24/22 1122)  SpO2: 97 % (03/24/22 1122)    Intake/Output Summary (Last 24 hours) at 3/24/2022 1346  Last data filed at 3/24/2022 0600  Gross per 24 hour   Intake 2562.55 ml   Output 3640 ml   Net -1077.45 ml     General:         Chronically ill-appearing, trach to vent FiO2 21%, in NAD.      Eyes:               Hyperemic conjunctiva both eyes, anicteric  ENT:                Very dry lower lip mucosa, desquamating skin noted, no oral thush Neck:               Supple  Lungs:            R base rhonchi, L mostly clear  Thick purulent secretions noted   Heart:              S1/S2+, regular rhythm, no murmurs  Abd:                RUQ CLARICE drain in place since 1/13/22   bilious fluid                          PEG tube in place, no evidence of redness or purulent drainage from ostomy, +BS, soft, non tender, moderately distended  :                  Hamilton, urine clear   Musc:              Flexure contractures on hands  Skin:               Warm, no rash   Wound:             Neuro:  Not following commands  Psych:            unable to perform   Lymphatic:      Extrem:           No LE edema b/l - contracted  VAD:                Peripheral IV      PICC line R 3/17                                                        Isolation:  Contact    3/22:        3/18:      Right elbow     Left foot     1/4/22:       Sacrum                        Right hand    General Labs reviewed:  Recent Labs   Lab 03/22/22  0340 03/23/22  0436 03/24/22  0325   WBC 3.69* 4.21 7.22   HGB 7.8* 7.9* 8.2*   HCT 25.6* 25.5* 27.1*    304 345       Recent Labs   Lab 03/22/22  0340 03/23/22  0436 03/24/22  0325    139 138   K 3.7 3.9 4.0    105 103   CO2 28 26 26   BUN 3* 5* 6   CREATININE 0.5 0.4* 0.5   CALCIUM 9.3 8.8 9.1   PROT 7.2 7.2 7.5   BILITOT 0.7 0.6 0.6   ALKPHOS 211* 202* 195*   ALT 82* 60* 50*   AST 49* 35 23     Recent Labs   Lab 03/24/22  0325   CRP 64.4*     No results for input(s): SEDRATE in the last 168 hours.    Estimated Creatinine Clearance: 105.8 mL/min (based on SCr of 0.5 mg/dL).     Micro:  Microbiology Results (last 7 days)     Procedure Component Value Units Date/Time    Blood culture [[id number]] Collected: 03/21/22 1428    Order Status: Completed Specimen: Blood Updated: 03/23/22 2222     Blood Culture, Routine No Growth to date      No Growth to date      No Growth to date    Aerobic culture [[id number]]  (Abnormal)  (Susceptibility) Collected: 03/18/22 1630     Order Status: Completed Specimen: Wound from Elbow, Right Updated: 03/23/22 1427     Aerobic Bacterial Culture ACINETOBACTER BAUMANNII   Moderate        METHICILLIN RESISTANT STAPHYLOCOCCUS AUREUS  Many        PROTEUS MIRABILIS  Few      Blood culture x two cultures. Draw prior to antibiotics. [[id number]] Collected: 03/17/22 0854    Order Status: Completed Specimen: Blood from Peripheral, Right Hand Updated: 03/22/22 1412     Blood Culture, Routine No growth after 5 days.    Narrative:      Aerobic and anaerobic    Urine Culture High Risk [[id number]]  (Abnormal)  (Susceptibility) Collected: 03/18/22 1010    Order Status: Completed Specimen: Urine, Catheterized Updated: 03/22/22 0923     Urine Culture, Routine PSEUDOMONAS AERUGINOSA   10,000 - 49,999 cfu/ml      Narrative:      Indicated criteria for high risk culture:->Other  Other (specify):->sepsis    Urine culture [[id number]] Collected: 03/20/22 0100    Order Status: Completed Specimen: Urine Updated: 03/21/22 1152     Urine Culture, Routine No growth    Narrative:      Specimen Source->Urine    Culture, Respiratory with Gram Stain [[id number]]  (Abnormal)  (Susceptibility) Collected: 03/17/22 1337    Order Status: Completed Specimen: Respiratory from Tracheal Aspirate Updated: 03/21/22 0912     Respiratory Culture No S aureus or Pseudomonas isolated.      SERRATIA MARCESCENS  Many        PROVIDENCIA STUARTII  Many  Normal respiratory kenny also present       Gram Stain (Respiratory) Rare WBC's     Gram Stain (Respiratory) Many Gram positive cocci     Gram Stain (Respiratory) Many Gram negative rods    Blood culture x two cultures. Draw prior to antibiotics. [[id number]]  (Abnormal)  (Susceptibility) Collected: 03/17/22 0844    Order Status: Completed Specimen: Blood Updated: 03/20/22 1249     Blood Culture, Routine Gram stain aer bottle: Gram positive cocci in chains resembling Strep       Results called to and read back by: RASHEED LACEY RN  03/18/2022         08:06      ENTEROCOCCUS FAECALIS    Narrative:      Aerobic and anaerobic          Imaging Reviewed:  CXR: Right lower lobe pneumonia.  Tracheostomy tube in place.  Abdominal US: Cholelithiasis in a contracted gallbladder.  Mild hydronephrosis of the right kidney.    KUB: Appropriately positioned PEG.     CT scan abdomen/pelvis 3/21:  1. Bibasilar airspace disease which could reflect atelectasis aspirate or pneumonia.  Trace bilateral pleural effusions.   2.  Multiple gallstones with cholecystostomy.  Gallbladder is decompressed.   3. Urinary bladder wall thickening could relate to inadequate distension.  Cystitis or outlet obstruction could also have this appearance.   4. Diffuse rectal wall thickening is presumably from under distension although proctitis possible in the appropriate clinical setting.     Cardiology: 3/21/22  · The left ventricle is normal in size with  · The estimated ejection fraction is 65%.  · Normal left ventricular diastolic function.  · Normal right ventricular size with normal right ventricular systolic function.  · There is mild pulmonary hypertension.  · Normal central venous pressure (3 mmHg).  · The estimated PA systolic pressure is 48 mmHg.  · On long-axis view there appears to be 1 cm echo artifact on the anterior leaf of the mitral valve left atrial side that is not seen on any other view-this probably represents mirror artifact  · Mild tricuspid regurgitation.      IMPRESSION & PLAN     1. Sepsis multifactorial; VAP/UTi/R elbow decubitus ulcer s/p Amikacin one dose 3/21   Procal 0.04-->0.12 normal    CRP 64   Blood cultures 3/17 Enterococcus faecalis - likely source gallbladder, Surgery recs appreciated - leave IR drain indefinitely    Urine culture 3/18 CRP Pseudomonas sensitive to Amikacin - repeat urine culture no growth   Respiratory culture 3/17 Serratia marcescens and Providencia stuartii   Wound cultures R elbow 3/18  Acinetobacter baumannii sensitive to TMP/SMX,  MRSA, Proteus mirabilis sensitive to Meropenem.   BRENDAN no evidence of vegetations, follow final report    2.  PMHx cardiac arrest, anoxic brain injury, bed-bound, cirrhosis, GERD, nursing home resident, PE, tracheostomy and PEG since July 2020.    Recommendations:  Baseline CPK 25  Stop Vanco  Start Daptomycin 420mg IV q24h for Enterococcus bacteremia and MRSA on R elbow  Meropenem  1g IV q8h    Bactrim DS 1 tab via PEG twice a day for 10-14 days for Acinetobacter  on R elbow  Vancomycin PO 4 times a day prophylactic while on antibiotics   Above for 2 weeks, estimated end date 4/4/2022  Trend CRP   Artifical tears 4 times a day   Follow cultures   Aspiration precautions     Will follow peripherally     D/w nursing, Dr Stevenson      Medical Decision Making during this encounter was  [_] Low Complexity  [_] Moderate Complexity  [xx] High Complexity

## 2022-03-24 NOTE — PHYSICIAN QUERY
PT Name: Genna Felix  MR #: [id number]    DOCUMENTATION CLARIFICATION     CDS: Dheeraj Palmer RN CCDS               Contact information: [email or web address]     This form is a permanent document in the medical record.     Query Date: March 24, 2022    By submitting this query, we are merely seeking further clarification of documentation. Please utilize your independent clinical judgment when addressing the question(s) below.    Supporting Clinical Findings Location in Medical Record   Acute bacterial pneumonia 2/2 Serratia marcenscens and Providencia stuartii  -vanc/merrem 3/21/2022 Hospital Medicine progress notes   VAP  Respiratory culture 3/17 Serratia marcescens and Providencia stuartii    Recommendations:  Continue Vancomycin IV, keep level around 15  Adjust Meropenem to 1g IV q8h   3/21/2022 ID consult   providentia and serratia in sputum  merrem adequate for providentia and serratia 3/21/2022 Pulmonology progress notes   anoxic brain injury, now vent/trach-PEG feeding dependent 3/17/2022 H&P   T 89.0-100.6, HR , RR 20-38, /61  3/17/2022 Vitals   O2 sat % on 28% FIO2 and PEEP 3.6-4.3 via Mechanical Ventilator  O2 sat % on 28% FIO2 and PEEP 5.2-6.1 via Mechanical Ventilator  O2 sat 83% on 100% FIO2, FIO2 % and PEEP 5-5.7 via Mechanical Ventilator 3/17/2022 Vitals  3/18/2022 Vitals  3/20/2022 Vitas   Secretions Amount moderate   Secretions Color yellow   Secretions Characteristics thick     Secretions Amount small   Secretions Color pale;yellow   Secretions Characteristics thick     Secretions Amount large   Secretions Color yellow;pale   Secretions Characteristics thick     Secretions Amount moderate   Secretions Color white;yellow   Secretions Characteristics thick    3/17/2022 Respiratory Therapy care update        3/19/2022 Respiratory Therapy care update        3/20/2022 Respiratory Therapy care update          3/24/2022 Respiratory Therapy care update    03/17/22 08:43  03/18/22 06:24   WBC 3.72 (L) 7.06    Lab values   Right lower lobe pneumonia. Tracheostomy tube in place. 3/17/2022 Chest x-ray       Provider, please clarify if there is any clinical correlation between Acute bacterial pneumonia 2/2 Serratia marcenscens and Providencia stuartii and Mechanical Ventilator.           Are the conditions:      [ x  ] Due to or associated with each other   [   ] Unrelated to each other   [   ] Other explanation (Please Specify): ______________   [  ] Clinically Undetermined                                                                               Please document in your progress notes daily for the duration of treatment until resolved and include in your discharge summary.

## 2022-03-24 NOTE — PROGRESS NOTES
"  03/24/2022      Admit Date: 3/17/2022  Genna Felix  Pulm Progress Note    Chief Complaint   Patient presents with    hypothermia     Pt sent by [facility] for hypothermia, nursing home staff reported to EMS t6hat pt warming blanket was not working       History of Present Illness:  Pt not arousing, with tactile stimuli her upper right gaze goes to left upper gaze.  Contracted.  On vent    Pt had prior pseudomonas , esbl klebsiella, and serratia  in sputum Jan 2022.  Review of record indicates h/o sz, cirrhosis, anoxic brain injury,   c diff, chr carroll.    Pt was NSR for  3 days 1/24/2022 -- Hospital Course:   Patient is a [place] patient was unfortunately the victim of anoxic brain injury and persistent vegetative state who presented to the hospital with hypothermia.  She underwent evaluation for sepsis and started on broad-spectrum antimicrobial therapy.  She underwent further evaluation cause of, including CT scan of the abdomen as she had a recent cholecystostomy tube, as well as chest x-ray.  Likely etiology of her sepsis was secondary to pneumonia.  She improved with broad-spectrum antimicrobial therapy, and was discharged to long-term acute care to completed full course of 10 days of IV antibiotics.  Goals of care discussion was had with the patient's family, who opted for DNR, but continuation of the current treatment.       From ERP, Dr Nino hpi today:Genna Felix is a 61 y.o. female who presents to the ED via EMS from [place] with an onset of hypothermia.  Patient was atop of a thermal blanket in order to regulate her temperature, but the blanket malfunctioned throughout the night causing patient's temperature to drop.  She presented to the ED with a rectal temp of 89 °F.  EMS notes the patient has a "blood shot" left eye and a steady "upward gaze."  Patient presents with a drain to the RUQ, booties to bilateral feet, trach in place and Carroll catheter, per EMS.  They note patient is a "full code" " and responds with a jump to startling noises only.  No other symptoms reported at this time.  Patient has a Hx of respiratory distress, hemangioma of intra-abdominal structure, tracheostomy dependence, anoxic brain damage, PE, HTN, protein claorie malnutrition, PEG tube placement, bed bound, total self-care deficit, nursing home resident, and cardiac arrest.  PSHx includes PEG with tracheostomy placement.  HPI is limited secondary to nonverbal patient.          Progress Note  PULMONARY    Admit Date: 3/17/2022   03/24/2022      SUBJECTIVE:     3/18 not arousing, more animated  3/19 no arousing,  3/21 no new c/o  3/22 (Montesinos)- remains on vent, VS stable. She is requiring warming blanket for hypothermia. Tube feeds are held currently. Pt had CT abd/p last night. RN reports rectal probe had stool impacted around it and s/p removal pt had 3 BMs last night.  3/23- remains on vent, no new issues  3/24- continues on vent, oxygenating well. Getting BRENDAN today    PFSH and Allergies reviewed.    OBJECTIVE:     Vitals (Most recent):  Vitals:    03/24/22 1400   BP: 113/60   Pulse: 82   Resp: 14   Temp: 96.62 °F (35.9 °C)       Vitals (24 hour range):  Temp:  [96.62 °F (35.9 °C)-99.86 °F (37.7 °C)]   Pulse:  []   Resp:  [14-59]   BP: (112-169)/()   SpO2:  [93 %-100 %]       Intake/Output Summary (Last 24 hours) at 3/24/2022 1555  Last data filed at 3/24/2022 0600  Gross per 24 hour   Intake 2562.55 ml   Output 3640 ml   Net -1077.45 ml          Physical Exam:  The patient's neuro status (alertness,orientation,cognitive function,motor skills,), pharyngeal exam (oral lesions, hygiene, abn dentition,), Neck (jvd,mass,thyroid,nodes in neck and above/below clavicle),RESPIRATORY(symmetry,effort,fremitus,percussion,auscultation),  Cor(rhythm,heart tones including gallops,perfusion,edema)ABD(distention,hepatic&splenomegaly,tenderness,masses), Skin(rash,cyanosis),Psyc(affect,judgement,).  Exam negative except for these  pertinent findings:    Eyes open spontaneously, moves jaw, doesn't track or verbalize.  Trach in place  Bilateral coarse breath sounds   Abdomen distended, +BS, ángel drain RUQ and PEG in place  Hamilton in place  Bilateral lower and upper ext 2+ pitting edema  Arms contracted    Radiographs reviewed: view by direct vision    CT abd/p 3/22-   1. Bibasilar airspace disease which could reflect atelectasis aspirate or pneumonia.  Trace bilateral pleural effusions.  2.  Multiple gallstones with cholecystostomy.  Gallbladder is decompressed.  3. Urinary bladder wall thickening could relate to inadequate distension.  Cystitis or outlet obstruction could also have this appearance.  4. Diffuse rectal wall thickening is presumably from under distension although proctitis possible in the appropriate clinical setting.      TTE 3/21-   · The left ventricle is normal in size with  · The estimated ejection fraction is 65%.  · Normal left ventricular diastolic function.  · Normal right ventricular size with normal right ventricular systolic function.  · There is mild pulmonary hypertension.  · Normal central venous pressure (3 mmHg).  · The estimated PA systolic pressure is 48 mmHg.  · On long-axis view there appears to be 1 cm echo artifact on the anterior leaf of the mitral valve left atrial side that is not seen on any other view-this probably represents mirror artifact  · Mild tricuspid regurgitation.        Labs     Recent Labs   Lab 03/24/22  0325   WBC 7.22   HGB 8.2*   HCT 27.1*        Recent Labs   Lab 03/24/22  0325 03/24/22  0900     --    K 4.0  --      --    CO2 26  --    BUN 6  --    CREATININE 0.5  --    GLU 89  --    CALCIUM 9.1  --    MG 1.7  --    PHOS 2.5*  --    AST 23  --    ALT 50*  --    ALKPHOS 195*  --    BILITOT 0.6  --    PROT 7.5  --    ALBUMIN 2.4*  --    CRP 64.4*  --    PROCAL 0.12  --    CPK  --  25   No results for input(s): PH, PCO2, PO2, HCO3 in the last 24 hours.  Microbiology  Results (last 7 days)     Procedure Component Value Units Date/Time    Blood culture [[id number]] Collected: 03/21/22 1428    Order Status: Completed Specimen: Blood Updated: 03/23/22 2222     Blood Culture, Routine No Growth to date      No Growth to date      No Growth to date    Aerobic culture [[id number]]  (Abnormal)  (Susceptibility) Collected: 03/18/22 1630    Order Status: Completed Specimen: Wound from Elbow, Right Updated: 03/23/22 1427     Aerobic Bacterial Culture ACINETOBACTER BAUMANNII   Moderate        METHICILLIN RESISTANT STAPHYLOCOCCUS AUREUS  Many        PROTEUS MIRABILIS  Few      Blood culture x two cultures. Draw prior to antibiotics. [[id number]] Collected: 03/17/22 0854    Order Status: Completed Specimen: Blood from Peripheral, Right Hand Updated: 03/22/22 1412     Blood Culture, Routine No growth after 5 days.    Narrative:      Aerobic and anaerobic    Urine Culture High Risk [[id number]]  (Abnormal)  (Susceptibility) Collected: 03/18/22 1010    Order Status: Completed Specimen: Urine, Catheterized Updated: 03/22/22 0923     Urine Culture, Routine PSEUDOMONAS AERUGINOSA   10,000 - 49,999 cfu/ml      Narrative:      Indicated criteria for high risk culture:->Other  Other (specify):->sepsis    Urine culture [[id number]] Collected: 03/20/22 0100    Order Status: Completed Specimen: Urine Updated: 03/21/22 1152     Urine Culture, Routine No growth    Narrative:      Specimen Source->Urine    Culture, Respiratory with Gram Stain [[id number]]  (Abnormal)  (Susceptibility) Collected: 03/17/22 1337    Order Status: Completed Specimen: Respiratory from Tracheal Aspirate Updated: 03/21/22 0912     Respiratory Culture No S aureus or Pseudomonas isolated.      SERRATIA MARCESCENS  Many        PROVIDENCIA STUARTII  Many  Normal respiratory kenny also present       Gram Stain (Respiratory) Rare WBC's     Gram Stain (Respiratory) Many Gram positive cocci     Gram Stain (Respiratory) Many Gram negative  rods    Blood culture x two cultures. Draw prior to antibiotics. [[id number]]  (Abnormal)  (Susceptibility) Collected: 03/17/22 0844    Order Status: Completed Specimen: Blood Updated: 03/20/22 1249     Blood Culture, Routine Gram stain aer bottle: Gram positive cocci in chains resembling Strep       Results called to and read back by: RASHEED LACEY RN  03/18/2022        08:06      ENTEROCOCCUS FAECALIS    Narrative:      Aerobic and anaerobic          Impression:  Active Hospital Problems    Diagnosis  POA    *Pneumonia of right lower lobe due to infectious organism [J18.9]  Yes    Leaking PEG tube [K94.23]  Yes    Pressure injury of right elbow, stage 4 [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             .        Plan: t min 89, p 58 to 107, merrem/vanc, midodrine, lovenox 40/d,  28% ox,wbc 3.7, procal 0.04, u/a pos for blood and wbc, cxr min rll opacification-- ct abd and cxr 1/26 with rll density from apparent atelectasis.     Urine culture positive for proteus 1/5/2022 s to merrem.  Not clear if had c diff prophylaxsis recent admits but  c diff Pos May 2021.    I do not see clear pneumonia with cxr pattern and procal.  Would monitor on merrem.       H/o prior c diff -- consider prophylaxsis for c diff?  Monitor.    3/18 tm 100.6, p 124,   I/o 1503/1550  merrem and vanc  lovenox       U/a abn,procal low and cxr stable from Jan-- would rec urine culture if not done    Pt called pneumonia but findings are minimal  -- await cultures, adjust abx, return to [facility] when stable.      3/19 tm 100.04, p to 119, merrem and vanc, rm air,   gnr sputum    Will recheck later, need sensitivities to determine dc abx.  3/21no  fever, vss, no fever,    providentia and serratia in sputum, pseudomonas in urine <50k, elbow wound with gnr/staph with sensitivities pending.    merrem adequate for providentia and serratia-- pseudomonas  and not significant at this time.  Continue vanc for now for elbow wound.    Today day 5 merrem, day 7 adequate to stop.    Above from Dr London and below from Dr Montesinos:    - continue vent support no weaning  - multiple infections are being addressed (pneumonia, wound, bacteremia and UTI)- per ID  - follow up BRENDAN results  - mucomyst w/ duo nebs q12h  - bowel regimen and management of feeds per GI  - lovenox for DVT prophylaxis  - midodrine continue      Dora Montesinos MD  Pulmonary & Critical Care Medicine

## 2022-03-24 NOTE — PLAN OF CARE
Problem: Adult Inpatient Plan of Care  Goal: Plan of Care Review  Outcome: Ongoing, Progressing     Problem: Aspiration (Enteral Nutrition)  Goal: Absence of Aspiration Signs and Symptoms  Outcome: Ongoing, Progressing     Problem: Skin Injury Risk Increased  Goal: Skin Health and Integrity  Outcome: Ongoing, Progressing     Problem: Device-Related Complication Risk (Artificial Airway)  Goal: Optimal Device Function  Outcome: Ongoing, Progressing     Problem: Infection  Goal: Absence of Infection Signs and Symptoms  Outcome: Ongoing, Progressing     Problem: Infection (Pneumonia)  Goal: Resolution of Infection Signs and Symptoms  Outcome: Ongoing, Progressing       Vital signs stable , no acute events overnight. Tf held @ 0000 for scheduled BRENDAN today. Adequate U/O. No acute distress noted.  Safety maintained. Bed alarm on. Continue to follow current POC

## 2022-03-24 NOTE — PROGRESS NOTES
Pharmacokinetic Assessment Follow Up: IV Vancomycin    Vancomycin serum concentration assessment(s):    The trough level was drawn correctly and can be used to guide therapy at this time. The measurement is within the desired definitive target range of 15 to 20 mcg/mL.    Vancomycin Regimen Plan:    Continue regimen to Vancomycin 1250 mg IV every 18 hours with next serum trough concentration measured at 2130 prior to 3rd dose on 3/25    Drug levels (last 3 results):  Recent Labs   Lab Result Units 03/22/22 2126 03/24/22  0900   Vancomycin-Trough ug/mL 17.1 17.2       Pharmacy will continue to follow and monitor vancomycin.    Please contact pharmacy at extension [phone] for questions regarding this assessment.    Thank you for the consult,   Tray Suresh       Patient brief summary:  Genna Felix is a 61 y.o. female initiated on antimicrobial therapy with IV Vancomycin for treatment of  Pneumonia    Drug Allergies:   Review of patient's allergies indicates:  No Known Allergies    Actual Body Weight:   70 kg     Renal Function:   Estimated Creatinine Clearance: 105.8 mL/min (based on SCr of 0.5 mg/dL).,     Dialysis Method (if applicable):  N/A    CBC (last 72 hours):  Recent Labs   Lab Result Units 03/22/22  0340 03/23/22  0436 03/24/22  0325   WBC K/uL 3.69* 4.21 7.22   Hemoglobin g/dL 7.8* 7.9* 8.2*   Hematocrit % 25.6* 25.5* 27.1*   Platelets K/uL 310 304 345   Gran % % 53.0 53.0 64.3   Lymph % % 37.0 35.0 21.6   Mono % % 6.0 5.0 8.9   Eosinophil % % 3.0 3.0 4.3   Basophil % % 0.0 0.0 0.3   Differential Method  Manual Manual Automated       Metabolic Panel (last 72 hours):  Recent Labs   Lab Result Units 03/22/22  0340 03/23/22  0436 03/24/22  0325   Sodium mmol/L 138 139 138   Potassium mmol/L 3.7 3.9 4.0   Chloride mmol/L 102 105 103   CO2 mmol/L 28 26 26   Glucose mg/dL 89 97 89   BUN mg/dL 3* 5* 6   Creatinine mg/dL 0.5 0.4* 0.5   Albumin g/dL 2.4* 2.4* 2.4*   Total Bilirubin mg/dL 0.7 0.6 0.6   Alkaline  Phosphatase U/L 211* 202* 195*   AST U/L 49* 35 23   ALT U/L 82* 60* 50*   Magnesium mg/dL 1.7 1.8 1.7   Phosphorus mg/dL 2.1* 2.3* 2.5*       Vancomycin Administrations:  vancomycin given in the last 96 hours                     vancomycin 1.25 g in dextrose 5% 250 mL IVPB (ready to mix) (mg) 1,250 mg New Bag 03/24/22 1031     1,250 mg New Bag 03/23/22 1753     1,250 mg New Bag 03/22/22 2230     1,250 mg New Bag  0508     1,250 mg New Bag 03/21/22 1018     1,250 mg New Bag 03/20/22 1628    vancomycin 125 mg/5 mL oral solution 125 mg (mg) 125 mg Given 03/24/22 0852     125 mg Given 03/23/22 2136     125 mg Given  0844     125 mg Given 03/22/22 2134     125 mg Given  0940     125 mg Given  0030     125 mg Given 03/21/22 0816     125 mg Given 03/20/22 2001                    Microbiologic Results:  Microbiology Results (last 7 days)       Procedure Component Value Units Date/Time    Blood culture [[id number]] Collected: 03/21/22 1428    Order Status: Completed Specimen: Blood Updated: 03/23/22 2222     Blood Culture, Routine No Growth to date      No Growth to date      No Growth to date    Aerobic culture [[id number]]  (Abnormal)  (Susceptibility) Collected: 03/18/22 1630    Order Status: Completed Specimen: Wound from Elbow, Right Updated: 03/23/22 1427     Aerobic Bacterial Culture ACINETOBACTER BAUMANNII   Moderate        METHICILLIN RESISTANT STAPHYLOCOCCUS AUREUS  Many        PROTEUS MIRABILIS  Few      Blood culture x two cultures. Draw prior to antibiotics. [[id number]] Collected: 03/17/22 0854    Order Status: Completed Specimen: Blood from Peripheral, Right Hand Updated: 03/22/22 1412     Blood Culture, Routine No growth after 5 days.    Narrative:      Aerobic and anaerobic    Urine Culture High Risk [[id number]]  (Abnormal)  (Susceptibility) Collected: 03/18/22 1010    Order Status: Completed Specimen: Urine, Catheterized Updated: 03/22/22 0923     Urine Culture, Routine PSEUDOMONAS AERUGINOSA   10,000 -  49,999 cfu/ml      Narrative:      Indicated criteria for high risk culture:->Other  Other (specify):->sepsis    Urine culture [[id number]] Collected: 03/20/22 0100    Order Status: Completed Specimen: Urine Updated: 03/21/22 1152     Urine Culture, Routine No growth    Narrative:      Specimen Source->Urine    Culture, Respiratory with Gram Stain [[id number]]  (Abnormal)  (Susceptibility) Collected: 03/17/22 1337    Order Status: Completed Specimen: Respiratory from Tracheal Aspirate Updated: 03/21/22 0912     Respiratory Culture No S aureus or Pseudomonas isolated.      SERRATIA MARCESCENS  Many        PROVIDENCIA STUARTII  Many  Normal respiratory kenny also present       Gram Stain (Respiratory) Rare WBC's     Gram Stain (Respiratory) Many Gram positive cocci     Gram Stain (Respiratory) Many Gram negative rods    Blood culture x two cultures. Draw prior to antibiotics. [[id number]]  (Abnormal)  (Susceptibility) Collected: 03/17/22 0844    Order Status: Completed Specimen: Blood Updated: 03/20/22 1249     Blood Culture, Routine Gram stain aer bottle: Gram positive cocci in chains resembling Strep       Results called to and read back by: RASHEED LACEY RN  03/18/2022        08:06      ENTEROCOCCUS FAECALIS    Narrative:      Aerobic and anaerobic

## 2022-03-25 VITALS
OXYGEN SATURATION: 96 % | WEIGHT: 156.31 LBS | HEIGHT: 60 IN | BODY MASS INDEX: 30.69 KG/M2 | RESPIRATION RATE: 20 BRPM | SYSTOLIC BLOOD PRESSURE: 115 MMHG | HEART RATE: 88 BPM | DIASTOLIC BLOOD PRESSURE: 76 MMHG | TEMPERATURE: 97 F

## 2022-03-25 PROBLEM — J15.69 PNEUMONIA DUE TO SERRATIA MARCESCENS: Status: ACTIVE | Noted: 2022-03-18

## 2022-03-25 PROBLEM — K94.23 LEAKING PEG TUBE: Status: RESOLVED | Noted: 2022-03-20 | Resolved: 2022-03-25

## 2022-03-25 PROBLEM — A41.9 SEPSIS: Status: RESOLVED | Noted: 2021-04-22 | Resolved: 2022-03-25

## 2022-03-25 PROBLEM — N39.0 PSEUDOMONAS URINARY TRACT INFECTION: Status: ACTIVE | Noted: 2022-03-25

## 2022-03-25 PROBLEM — K80.10 CHOLELITHIASIS WITH CHRONIC CHOLECYSTITIS: Status: ACTIVE | Noted: 2022-03-25

## 2022-03-25 PROBLEM — S51.001A OPEN WOUND OF RIGHT ELBOW: Status: ACTIVE | Noted: 2022-03-25

## 2022-03-25 PROBLEM — B96.5 PSEUDOMONAS URINARY TRACT INFECTION: Status: ACTIVE | Noted: 2022-03-25

## 2022-03-25 PROBLEM — J95.851 VAP (VENTILATOR-ASSOCIATED PNEUMONIA): Status: ACTIVE | Noted: 2022-03-18

## 2022-03-25 LAB
ALBUMIN SERPL BCP-MCNC: 2.4 G/DL (ref 3.5–5.2)
ALP SERPL-CCNC: 194 U/L (ref 55–135)
ALT SERPL W/O P-5'-P-CCNC: 49 U/L (ref 10–44)
ANION GAP SERPL CALC-SCNC: 10 MMOL/L (ref 8–16)
AST SERPL-CCNC: 35 U/L (ref 10–40)
BASOPHILS # BLD AUTO: 0.02 K/UL (ref 0–0.2)
BASOPHILS NFR BLD: 0.3 % (ref 0–1.9)
BILIRUB SERPL-MCNC: 0.5 MG/DL (ref 0.1–1)
BUN SERPL-MCNC: 9 MG/DL (ref 6–20)
CALCIUM SERPL-MCNC: 9.2 MG/DL (ref 8.7–10.5)
CHLORIDE SERPL-SCNC: 105 MMOL/L (ref 95–110)
CO2 SERPL-SCNC: 24 MMOL/L (ref 23–29)
CREAT SERPL-MCNC: 0.5 MG/DL (ref 0.5–1.4)
DIFFERENTIAL METHOD: ABNORMAL
EOSINOPHIL # BLD AUTO: 0.3 K/UL (ref 0–0.5)
EOSINOPHIL NFR BLD: 5.2 % (ref 0–8)
ERYTHROCYTE [DISTWIDTH] IN BLOOD BY AUTOMATED COUNT: 17.2 % (ref 11.5–14.5)
EST. GFR  (AFRICAN AMERICAN): >60 ML/MIN/1.73 M^2
EST. GFR  (NON AFRICAN AMERICAN): >60 ML/MIN/1.73 M^2
GLUCOSE SERPL-MCNC: 95 MG/DL (ref 70–110)
HCT VFR BLD AUTO: 28.5 % (ref 37–48.5)
HGB BLD-MCNC: 8.6 G/DL (ref 12–16)
IMM GRANULOCYTES # BLD AUTO: 0.02 K/UL (ref 0–0.04)
IMM GRANULOCYTES NFR BLD AUTO: 0.3 % (ref 0–0.5)
LYMPHOCYTES # BLD AUTO: 1.5 K/UL (ref 1–4.8)
LYMPHOCYTES NFR BLD: 23.8 % (ref 18–48)
MAGNESIUM SERPL-MCNC: 1.7 MG/DL (ref 1.6–2.6)
MCH RBC QN AUTO: 25.4 PG (ref 27–31)
MCHC RBC AUTO-ENTMCNC: 30.2 G/DL (ref 32–36)
MCV RBC AUTO: 84 FL (ref 82–98)
MONOCYTES # BLD AUTO: 0.4 K/UL (ref 0.3–1)
MONOCYTES NFR BLD: 6.9 % (ref 4–15)
NEUTROPHILS # BLD AUTO: 3.9 K/UL (ref 1.8–7.7)
NEUTROPHILS NFR BLD: 63.5 % (ref 38–73)
NRBC BLD-RTO: 0 /100 WBC
PHOSPHATE SERPL-MCNC: 2.5 MG/DL (ref 2.7–4.5)
PLATELET # BLD AUTO: 369 K/UL (ref 150–450)
PMV BLD AUTO: 9.8 FL (ref 9.2–12.9)
POCT GLUCOSE: 108 MG/DL (ref 70–110)
POCT GLUCOSE: 112 MG/DL (ref 70–110)
POTASSIUM SERPL-SCNC: 3.8 MMOL/L (ref 3.5–5.1)
PROT SERPL-MCNC: 7.6 G/DL (ref 6–8.4)
RBC # BLD AUTO: 3.38 M/UL (ref 4–5.4)
SODIUM SERPL-SCNC: 139 MMOL/L (ref 136–145)
WBC # BLD AUTO: 6.1 K/UL (ref 3.9–12.7)

## 2022-03-25 PROCEDURE — 20000000 HC ICU ROOM

## 2022-03-25 PROCEDURE — 80053 COMPREHEN METABOLIC PANEL: CPT | Performed by: INTERNAL MEDICINE

## 2022-03-25 PROCEDURE — 25000003 PHARM REV CODE 250: Performed by: INTERNAL MEDICINE

## 2022-03-25 PROCEDURE — 84100 ASSAY OF PHOSPHORUS: CPT | Performed by: INTERNAL MEDICINE

## 2022-03-25 PROCEDURE — 85025 COMPLETE CBC W/AUTO DIFF WBC: CPT | Performed by: INTERNAL MEDICINE

## 2022-03-25 PROCEDURE — 99900035 HC TECH TIME PER 15 MIN (STAT)

## 2022-03-25 PROCEDURE — 25000242 PHARM REV CODE 250 ALT 637 W/ HCPCS: Performed by: INTERNAL MEDICINE

## 2022-03-25 PROCEDURE — A4216 STERILE WATER/SALINE, 10 ML: HCPCS | Performed by: INTERNAL MEDICINE

## 2022-03-25 PROCEDURE — 83735 ASSAY OF MAGNESIUM: CPT | Performed by: INTERNAL MEDICINE

## 2022-03-25 PROCEDURE — 90472 IMMUNIZATION ADMIN EACH ADD: CPT | Performed by: HOSPITALIST

## 2022-03-25 PROCEDURE — 99900026 HC AIRWAY MAINTENANCE (STAT)

## 2022-03-25 PROCEDURE — 63600175 PHARM REV CODE 636 W HCPCS: Performed by: STUDENT IN AN ORGANIZED HEALTH CARE EDUCATION/TRAINING PROGRAM

## 2022-03-25 PROCEDURE — 63600175 PHARM REV CODE 636 W HCPCS: Performed by: INTERNAL MEDICINE

## 2022-03-25 PROCEDURE — G0009 ADMIN PNEUMOCOCCAL VACCINE: HCPCS | Performed by: HOSPITALIST

## 2022-03-25 PROCEDURE — 94003 VENT MGMT INPAT SUBQ DAY: CPT

## 2022-03-25 PROCEDURE — 95819 EEG AWAKE AND ASLEEP: CPT

## 2022-03-25 PROCEDURE — 63600175 PHARM REV CODE 636 W HCPCS: Performed by: HOSPITALIST

## 2022-03-25 PROCEDURE — 90686 IIV4 VACC NO PRSV 0.5 ML IM: CPT | Performed by: HOSPITALIST

## 2022-03-25 PROCEDURE — 90471 IMMUNIZATION ADMIN: CPT | Performed by: HOSPITALIST

## 2022-03-25 PROCEDURE — 94761 N-INVAS EAR/PLS OXIMETRY MLT: CPT

## 2022-03-25 PROCEDURE — 99232 SBSQ HOSP IP/OBS MODERATE 35: CPT | Mod: ,,, | Performed by: INTERNAL MEDICINE

## 2022-03-25 PROCEDURE — 25000003 PHARM REV CODE 250: Performed by: STUDENT IN AN ORGANIZED HEALTH CARE EDUCATION/TRAINING PROGRAM

## 2022-03-25 PROCEDURE — 99232 PR SUBSEQUENT HOSPITAL CARE,LEVL II: ICD-10-PCS | Mod: ,,, | Performed by: INTERNAL MEDICINE

## 2022-03-25 PROCEDURE — 94640 AIRWAY INHALATION TREATMENT: CPT

## 2022-03-25 PROCEDURE — 90670 PCV13 VACCINE IM: CPT | Performed by: HOSPITALIST

## 2022-03-25 PROCEDURE — 27200966 HC CLOSED SUCTION SYSTEM

## 2022-03-25 PROCEDURE — G0008 ADMIN INFLUENZA VIRUS VAC: HCPCS | Performed by: HOSPITALIST

## 2022-03-25 PROCEDURE — 27000221 HC OXYGEN, UP TO 24 HOURS

## 2022-03-25 PROCEDURE — 27000207 HC ISOLATION

## 2022-03-25 RX ORDER — MEROPENEM AND SODIUM CHLORIDE 1 G/50ML
1 INJECTION, SOLUTION INTRAVENOUS EVERY 8 HOURS
Qty: 2100 ML | Refills: 0
Start: 2022-03-25 | End: 2022-04-08

## 2022-03-25 RX ORDER — SULFAMETHOXAZOLE AND TRIMETHOPRIM 800; 160 MG/1; MG/1
1 TABLET ORAL 2 TIMES DAILY
Qty: 28 TABLET | Refills: 0
Start: 2022-03-25 | End: 2022-04-08

## 2022-03-25 RX ADMIN — MIDODRINE HYDROCHLORIDE 10 MG: 5 TABLET ORAL at 09:03

## 2022-03-25 RX ADMIN — MIDODRINE HYDROCHLORIDE 10 MG: 5 TABLET ORAL at 08:03

## 2022-03-25 RX ADMIN — ACETYLCYSTEINE 4 ML: 100 SOLUTION ORAL; RESPIRATORY (INHALATION) at 07:03

## 2022-03-25 RX ADMIN — Medication 125 MG: at 09:03

## 2022-03-25 RX ADMIN — HYPROMELLOSE 2910 1 DROP: 5 SOLUTION OPHTHALMIC at 09:03

## 2022-03-25 RX ADMIN — ENOXAPARIN SODIUM 40 MG: 40 INJECTION SUBCUTANEOUS at 04:03

## 2022-03-25 RX ADMIN — IPRATROPIUM BROMIDE AND ALBUTEROL SULFATE 3 ML: 2.5; .5 SOLUTION RESPIRATORY (INHALATION) at 08:03

## 2022-03-25 RX ADMIN — SODIUM CHLORIDE, PRESERVATIVE FREE 10 ML: 5 INJECTION INTRAVENOUS at 12:03

## 2022-03-25 RX ADMIN — SODIUM CHLORIDE, PRESERVATIVE FREE 10 ML: 5 INJECTION INTRAVENOUS at 05:03

## 2022-03-25 RX ADMIN — COLLAGENASE SANTYL: 250 OINTMENT TOPICAL at 08:03

## 2022-03-25 RX ADMIN — HYPROMELLOSE 2910 1 DROP: 5 SOLUTION OPHTHALMIC at 04:03

## 2022-03-25 RX ADMIN — HYPROMELLOSE 2910 1 DROP: 5 SOLUTION OPHTHALMIC at 01:03

## 2022-03-25 RX ADMIN — PNEUMOCOCCAL 13-VALENT CONJUGATE VACCINE 0.5 ML: 2.2; 2.2; 2.2; 2.2; 2.2; 4.4; 2.2; 2.2; 2.2; 2.2; 2.2; 2.2; 2.2 INJECTION, SUSPENSION INTRAMUSCULAR at 12:03

## 2022-03-25 RX ADMIN — MEROPENEM AND SODIUM CHLORIDE 1 G: 1 INJECTION, SOLUTION INTRAVENOUS at 09:03

## 2022-03-25 RX ADMIN — MEROPENEM AND SODIUM CHLORIDE 1 G: 1 INJECTION, SOLUTION INTRAVENOUS at 01:03

## 2022-03-25 RX ADMIN — INFLUENZA VIRUS VACCINE 0.5 ML: 15; 15; 15; 15 SUSPENSION INTRAMUSCULAR at 01:03

## 2022-03-25 RX ADMIN — MINERAL SUPPLEMENT IRON 300 MG / 5 ML STRENGTH LIQUID 100 PER BOX UNFLAVORED 300 MG: at 08:03

## 2022-03-25 RX ADMIN — DAPTOMYCIN 420 MG: 350 INJECTION, POWDER, LYOPHILIZED, FOR SOLUTION INTRAVENOUS at 02:03

## 2022-03-25 RX ADMIN — Medication 125 MG: at 08:03

## 2022-03-25 RX ADMIN — MEROPENEM AND SODIUM CHLORIDE 1 G: 1 INJECTION, SOLUTION INTRAVENOUS at 05:03

## 2022-03-25 RX ADMIN — IPRATROPIUM BROMIDE AND ALBUTEROL SULFATE 3 ML: 2.5; .5 SOLUTION RESPIRATORY (INHALATION) at 07:03

## 2022-03-25 RX ADMIN — SULFAMETHOXAZOLE AND TRIMETHOPRIM 1 TABLET: 800; 160 TABLET ORAL at 08:03

## 2022-03-25 RX ADMIN — HYPROMELLOSE 2910 1 DROP: 5 SOLUTION OPHTHALMIC at 08:03

## 2022-03-25 RX ADMIN — SULFAMETHOXAZOLE AND TRIMETHOPRIM 1 TABLET: 800; 160 TABLET ORAL at 09:03

## 2022-03-25 RX ADMIN — MIDODRINE HYDROCHLORIDE 10 MG: 5 TABLET ORAL at 03:03

## 2022-03-25 RX ADMIN — ACETYLCYSTEINE 4 ML: 100 SOLUTION ORAL; RESPIRATORY (INHALATION) at 08:03

## 2022-03-25 NOTE — PLAN OF CARE
Patient is clear for discharge to [facility] senior living from case management standpoint.  Luke (nurse) notified.  Luke to call report to [facility] [phone].  The patient is going to room 107.  Ambulance transportation has been arranged with a pickup time of 1:30pm.          03/25/22 1246   Final Note   Assessment Type Final Discharge Note   Anticipated Discharge Disposition senior living [facility]   What phone number can be called within the next 1-3 days to see how you are doing after discharge? [phone]   Post-Acute Status   Post-Acute Authorization Placement   Post-Acute Placement Status Set-up Complete/Auth obtained   Discharge Delays (!) Ambulance Transport/Facility Transport

## 2022-03-25 NOTE — PROGRESS NOTES
Progress Note  Infectious Disease    Reason for Consult: VAP/Enterococcus bacteremia/ Pseudomonas UTI    HPI: Genna Felix is a 61 y.o. female known to our service for prior hospital admissions last one 1/12/22 for acute cholecystitis s/p IR drainage 1/13, resident of [facility], with past medical history of anoxic brain injury status post tracheostomy/peg tube, prior PE, hypertension, and GERD, send from  for hypothermia. History very limited; patient is non-verbal at baseline, no family present.     In the R, patient with BP 96/55, tachycardic 107, temp 94.2  Labs significant for leukopenia 3.7, PMNL 72.3%, l: 16.9%  Cr 0.5, patient with muscle wasting   Albumin 3.1 , AST 54/ALT 98  Procal 0.04, negative   UA with 3+ protein, 1+ leukocyte esterase, RBC 50, WBC 5 - urine culture grew  Pseudomonas aeruginosa   CXR: RLL infiltrate     Admitted for sever sepsis multifactorial; VAP/UTi/R elbow decubitus ulcer.    Empirically started on Vancomycin, Meropenem, and PO vancomycin.     Hospital course complicated by Enterococcus faecalis bacteremia.   ID consult for antibiotic recommendations.     INTERVAL HISTORY:  3/22/22: Interim reviewed, patient seen and examined at bedside. Labile BP, hypothermia 95.7 overnight, currently normal temp. Micro reviewed, repeat blood cultures 3/21 x 1 no growth. Repeat urine culture no growth, s/p amikacin.  Wound cultures grew CRP Acinetobacter baumannii, MRSA and Proteus mirabils sensitive to meropenem.  CT scan abdomen/pelvis revealed bibasilar airspace disease. Multiple gallstones with cholecystostomy in place, gallbladder decompressed. Cystitis and diffuse rectal wall thickening s/o proctitis.     3/23: Patient seen and examined at bedside. Patient tachypneic, tachycardic, afebrile. Repeat blood cultures 3/21 no growth. Wound cultures grew CRP Acinetobacter baumannii, MRSA and Proteus mirabils sensitive to meropenem. Labs reviewed, WBC: 4.2,PMN: 53%, bands  4%.    3/24: Interim reviewed, patient seen and examined at bedside.  Repeat blood cultures 3/21 x1 no growth. BRENDAN no evidence of vegetations. Labs reviewed, WBC stable, no bands. Procal 0.12, normal.     03/25/2022.  She is afebrile today highest temperature 98.2° WBC 6.1.   Blood cultures no growth to date.    Most recent Urine culture is negative.    Prior urine culture  03/18 had Pseudomonas .  Right elbow cultures of 03/18 showed Acinetobacter CR 0, MRSA, Proteus mirabilis.  Most recent sputum culture 3/17 had Serratia and Providencia    Antibiotics (From admission, onward)            Start     Stop Route Frequency Ordered    03/24/22 1630  DAPTOmycin (CUBICIN) 420 mg in sodium chloride 0.9% IVPB         -- IV Every 24 hours (non-standard times) 03/24/22 1523    03/23/22 2100  sulfamethoxazole-trimethoprim 800-160mg per tablet 1 tablet         04/06 2059 PER G TUBE 2 times daily 03/23/22 1610    03/21/22 2100  meropenem-0.9% sodium chloride 1 g/50 mL IVPB         -- IV Every 8 hours (non-standard times) 03/21/22 1420    03/17/22 2100  vancomycin 125 mg/5 mL oral solution 125 mg         -- PER G TUBE 2 times daily 03/17/22 2008        Antifungals (From admission, onward)            None        Antivirals (From admission, onward)    None          Review of patient's allergies indicates:  No Known Allergies  Past Medical History:   Diagnosis Date    Anoxic brain damage 07/2020    Bedbound 07/2020    Cardiac arrest 07/2020    Cirrhosis     GERD (gastroesophageal reflux disease)     Hemangioma of intra-abdominal structure     Hypertension     Nursing home resident     Protein calorie malnutrition     Pulmonary embolus     Respiratory distress     S/P percutaneous endoscopic gastrostomy (PEG) tube placement 07/2020    Total self-care deficit 07/2020    Tracheostomy dependence     7/2020     Past Surgical History:   Procedure Laterality Date    PEG W/TRACHEOSTOMY PLACEMENT  07/27/2020    SKIN GRAFT       buttocks     Social History     Tobacco Use    Smoking status: Never Smoker    Smokeless tobacco: Never Used   Substance Use Topics    Alcohol use: Not Currently        Family History   Problem Relation Age of Onset    No Known Problems Mother     No Known Problems Father        Pertinent medications noted:     Review of Systems:   Unable to obtain, patient is non-verbal, no family at bedside.     EXAM & DIAGNOSTICS REVIEWED:   Vitals:     Temp:  [96.26 °F (35.7 °C)-99.68 °F (37.6 °C)]   Temp: 98.24 °F (36.8 °C) (03/25/22 0702)  Pulse: 110 (03/25/22 0702)  Resp: (!) 32 (03/25/22 0702)  BP: 126/63 (03/25/22 0700)  SpO2: 96 % (03/25/22 0702)    Intake/Output Summary (Last 24 hours) at 3/25/2022 1059  Last data filed at 3/25/2022 0400  Gross per 24 hour   Intake 1751.63 ml   Output 2680 ml   Net -928.37 ml   Vent Mode: A/CMV-VC  Oxygen Concentration (%):  [21] 21  Resp Rate Total:  [14 br/min-33 br/min] 32 br/min  Vt Set:  [400 mL] 400 mL  PEEP/CPAP:  [4.8 cmH20-7.5 cmH20] 7.5 cmH20  Mean Airway Pressure:  [11.1 poF70-77.2 cmH20] 17.2 cmH20    Drain Output  03/24 0701 - 03/25 0700  In: 1751.6   Out: 2680 [Drains:30]    General:         Chronically ill-appearing, trach to vent FiO2 21%, in NAD.      Eyes:               Hyperemic conjunctiva both eyes, anicteric  ENT:                Very dry lower lip mucosa, desquamating skin noted, no oral thush Neck:              Supple  Lungs:            R base rhonchi, L mostly clear  + secretions noted   Heart:              S1/S2+, regular rhythm, no murmurs  Abd:                RUQ CLARICE drain in place since 1/13/22   bilious fluid                          PEG 07/27/20, no evidence of redness or purulent drainage from ostomy, +BS, soft, non tender, moderately distended  :                  Hamilton, urine clear   Musc:              Flexure contractures on hands  Skin:               Warm, no rash   Wound:             Neuro:  Not following commands.  Psych:           Patient  grimaced as  I approached her for physical exam.  I reassured her, that I will only examine her, no interventions will be done.  I am not sure if she understood me, or if  Was my soothing voice, but her face relaxed.  Lymphatic:      Extrem:           No LE edema b/l - contracted  VAD:                Peripheral IV      PICC line R 3/17                                                          Isolation:  Contact  03/25                3/22:        3/18:      Right elbow     Left foot     1/4/22:       Sacrum                        Right hand    General Labs reviewed:  Recent Labs   Lab 03/23/22 0436 03/24/22 0325 03/25/22  0339   WBC 4.21 7.22 6.10   HGB 7.9* 8.2* 8.6*   HCT 25.5* 27.1* 28.5*    345 369       Recent Labs   Lab 03/23/22 0436 03/24/22 0325 03/25/22  0339    138 139   K 3.9 4.0 3.8    103 105   CO2 26 26 24   BUN 5* 6 9   CREATININE 0.4* 0.5 0.5   CALCIUM 8.8 9.1 9.2   PROT 7.2 7.5 7.6   BILITOT 0.6 0.6 0.5   ALKPHOS 202* 195* 194*   ALT 60* 50* 49*   AST 35 23 35     Recent Labs   Lab 03/24/22 0325   CRP 64.4*     No results for input(s): SEDRATE in the last 168 hours.    Estimated Creatinine Clearance: 106.5 mL/min (based on SCr of 0.5 mg/dL).     Micro:  Microbiology Results (last 7 days)     Procedure Component Value Units Date/Time    Blood culture [[id number]] Collected: 03/21/22 1428    Order Status: Completed Specimen: Blood Updated: 03/24/22 2222     Blood Culture, Routine No Growth to date      No Growth to date      No Growth to date      No Growth to date    Aerobic culture [[id number]]  (Abnormal)  (Susceptibility) Collected: 03/18/22 1630    Order Status: Completed Specimen: Wound from Elbow, Right Updated: 03/23/22 1427     Aerobic Bacterial Culture ACINETOBACTER BAUMANNII   Moderate        METHICILLIN RESISTANT STAPHYLOCOCCUS AUREUS  Many        PROTEUS MIRABILIS  Few      Blood culture x two cultures. Draw prior to antibiotics. [[id number]] Collected: 03/17/22  0854    Order Status: Completed Specimen: Blood from Peripheral, Right Hand Updated: 03/22/22 1412     Blood Culture, Routine No growth after 5 days.    Narrative:      Aerobic and anaerobic    Urine Culture High Risk [[id number]]  (Abnormal)  (Susceptibility) Collected: 03/18/22 1010    Order Status: Completed Specimen: Urine, Catheterized Updated: 03/22/22 0923     Urine Culture, Routine PSEUDOMONAS AERUGINOSA   10,000 - 49,999 cfu/ml      Narrative:      Indicated criteria for high risk culture:->Other  Other (specify):->sepsis    Urine culture [[id number]] Collected: 03/20/22 0100    Order Status: Completed Specimen: Urine Updated: 03/21/22 1152     Urine Culture, Routine No growth    Narrative:      Specimen Source->Urine    Culture, Respiratory with Gram Stain [[id number]]  (Abnormal)  (Susceptibility) Collected: 03/17/22 1337    Order Status: Completed Specimen: Respiratory from Tracheal Aspirate Updated: 03/21/22 0912     Respiratory Culture No S aureus or Pseudomonas isolated.      SERRATIA MARCESCENS  Many        PROVIDENCIA STUARTII  Many  Normal respiratory kenny also present       Gram Stain (Respiratory) Rare WBC's     Gram Stain (Respiratory) Many Gram positive cocci     Gram Stain (Respiratory) Many Gram negative rods    Blood culture x two cultures. Draw prior to antibiotics. [[id number]]  (Abnormal)  (Susceptibility) Collected: 03/17/22 0844    Order Status: Completed Specimen: Blood Updated: 03/20/22 1249     Blood Culture, Routine Gram stain aer bottle: Gram positive cocci in chains resembling Strep       Results called to and read back by: RASHEED LACEY RN  03/18/2022        08:06      ENTEROCOCCUS FAECALIS    Narrative:      Aerobic and anaerobic          Imaging Reviewed:  CXR: Right lower lobe pneumonia.  Tracheostomy tube in place.  Abdominal US: Cholelithiasis in a contracted gallbladder.  Mild hydronephrosis of the right kidney.    KUB: Appropriately positioned PEG.     CT scan  abdomen/pelvis 3/21:  1. Bibasilar airspace disease which could reflect atelectasis aspirate or pneumonia.  Trace bilateral pleural effusions.   2.  Multiple gallstones with cholecystostomy.  Gallbladder is decompressed.   3. Urinary bladder wall thickening could relate to inadequate distension.  Cystitis or outlet obstruction could also have this appearance.   4. Diffuse rectal wall thickening is presumably from under distension although proctitis possible in the appropriate clinical setting.     Cardiology: 3/21/22  · The left ventricle is normal in size with  · The estimated ejection fraction is 65%.  · Normal left ventricular diastolic function.  · Normal right ventricular size with normal right ventricular systolic function.  · There is mild pulmonary hypertension.  · Normal central venous pressure (3 mmHg).  · The estimated PA systolic pressure is 48 mmHg.  · On long-axis view there appears to be 1 cm echo artifact on the anterior leaf of the mitral valve left atrial side that is not seen on any other view-this probably represents mirror artifact  · Mild tricuspid regurgitation.      IMPRESSION & PLAN     1.  Sepsis multifactorial; VAP/UTi/R elbow decubitus ulcer s/p Amikacin one dose 3/21   Procal 0.04-->0.12 normal    CRP 64   Blood cultures 3/17 Enterococcus faecalis - likely source gallbladder, Surgery recs appreciated - leave IR drain indefinitely    Urine culture 3/18 CRP Pseudomonas sensitive to Amikacin - repeat urine culture no growth   Respiratory culture 3/17 Serratia marcescens and Providencia stuartii   Wound cultures R elbow 3/18  Acinetobacter baumannii sensitive to TMP/SMX, MRSA, Proteus mirabilis sensitive to Meropenem.   BRENDAN no evidence of vegetations, follow final report    2.  PMHx cardiac arrest, anoxic brain injury, bed-bound, cirrhosis, GERD, nursing home resident, PE, tracheostomy and PEG since July 2020.    This patient is high risk for life-threatening deterioration and death  secondary to above comorbidities and need for IV treatment    Recommendations:  --Daptomycin 420mg IV q24h for Enterococcus bacteremia 03/17 and MRSA on R elbow   ---Meropenem  1g IV q8h , will cover sputum  bacteria (serratia and Providentia)(and proteus or fight elbow)  ---Bactrim DS 1 tab via PEG twice a day for 14 days for Acinetobacter  on R elbow  --Vancomycin PO 4 times a day prophylactic while on antibiotics (h/o cdiff)    Above for 2 weeks, estimated end date 4/4/2022  Trend CRP   Dr. Schneider comes back on service tomorrow  D/w nursing,       Medical Decision Making during this encounter was  [_] Low Complexity  [_] Moderate Complexity  [xx] High Complexity

## 2022-03-25 NOTE — CARE UPDATE
03/24/22 2022   Patient Assessment/Suction   Respiratory Effort Unlabored   Expansion/Accessory Muscles/Retractions no use of accessory muscles;no retractions;expansion symmetric   All Lung Fields Breath Sounds Anterior:;Lateral:   MAYNOR Breath Sounds coarse;rhonchi   Rhythm/Pattern, Respiratory assisted mechanically   Cough Frequency with stimulation   Cough Type assisted   Suction Method tracheal   $ Suction Charges Inline Suction Procedure Stat Charge   Secretions Amount moderate   Secretions Color yellow   Secretions Characteristics thick   Sputum Collection sample obtained per suctioning   Aspirate Toleration TENA (no adverse reactions)   Sent to the lab? Yes   PRE-TX-O2   O2 Device (Oxygen Therapy) ventilator   $ Is the patient on Low Flow Oxygen? Yes   Oxygen Concentration (%) 21   SpO2 96 %   Pulse Oximetry Type Continuous   $ Pulse Oximetry - Multiple Charge Pulse Oximetry - Multiple   Pulse 72   Resp 16   Temp 96.44 °F (35.8 °C)   /70   ETCO2   $ ETCO2 Usage Currently wearing   ETCO2 (mmHg) 32 mmHg   ETCO2 Device Type Bedside Monitor;Artificial Airway;Ventilator   Breath Sounds Post-Respiratory Treatment   Throughout All Fields Post-Treatment All Fields   Throughout All Fields Post-Treatment no change        Surgical Airway Portex Cuffed   No placement date or time found.   Present Prior to Hospital Arrival?: Yes  Brand: Portex  Airway Device Size: 8.0  Style: Cuffed   Cuff Pressure   (mop)   Cuff Inflation? Inflated   Speaking Valve Usage Not wearing   Status Secured   Site Assessment Midline   Ties Assessment Clean;Dry;Intact;Secure   Airway Safety   Ambu bag with the patient? Yes, Adult Ambu   Is mask with the patient? Yes, Adult Mask   Suction set is at the bedside? Yes   Extra trach at bedside? No   Is obturator available? No   Vent Select   Conventional Vent Y   Charged w/in last 24h YES   Preset Conventional Ventilator Settings   Vent Type NKV-550   Ventilation Type VC   Vent Mode A/CMV-VC    Humidity HME   Set Rate 14 BPM   Vt Set 400 mL   PEEP/CPAP 5.7 cmH20   Waveform RAMP   I-Trigger Type  Flow   Patient Ventilator Parameters   Resp Rate Total 17 br/min   Peak Airway Pressure 31 cmH2O   Mean Airway Pressure 11.1 cmH20   Exhaled Vt 409 mL   Total Ve 6.38 mL   I:E Ratio Measured 1:3.3   Total PEEP 5.2 cmH20   Tubing ID (mm) 8 mm   Tube Type Trach   Inspired Tidal Volume (VTI) 422 mL   Conventional Ventilator Alarms   Alarms On Y   Ve High Alarm 14 L/min   Ve Low Alarm 3 L/min   Vt High Alarm 13 mL   Vt Low Alarm 5 mL   Resp Rate High Alarm 40 br/min   Press High Alarm 55 cmH2O   Apnea Rate 14   Apnea Volume (mL) 400 mL   Delay Alarm (Sec) 30 sec(s)

## 2022-03-25 NOTE — PLAN OF CARE
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]  Yes    Cirrhosis of liver [K74.60]  Yes    Chronic respiratory failure with hypoxia [J96.11]  Yes    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]  Not Applicable    Persistent vegetative state [R40.3]  Yes    Anemia of chronic disease [D63.8]  Yes      Resolved Hospital Problems    Diagnosis Date Resolved POA    Leaking PEG tube [K94.23] 03/25/2022 Yes    Sepsis [A41.9] 03/25/2022 Yes     Allergies: Review of patient's allergies indicates:  No Known Allergies    Code Status: Full    Vitals: Routine       Diet: Tube feed: Isosource 1.5 at 50 ml/h and 110 ml flush q 4 hr + beneprotein BID +Benny BID    Activity: Activity as tolerated.  Turn patient every 2 hours.    Nursing Precautions: Pressure ulcer prevention    Vent settings: Vent Mode: A/CMV-VC  Oxygen Concentration (%):  21  Resp Rate:  14  Vt Set:  400 mL  PEEP/CPAP:  5.7 cmH20    Dialysis: Patient is not on dialysis.     Pending Diagnostic Studies:     None        Miscellaneous Care:   PEG Care:  Clean site every 24 hours  Hamilton Care: Empty Hamilton bag every shift.  Change Hamilton every month  Routine Skin for Bedridden Patients:  Apply moisture barrier cream to all  Wound Care:   Clean right elbow with wound cleanser and pat dry. Apply a nickel thick layer of santyl to the wound bed and pack with a folded 4x4 or 4 2x2 gauze then secure with kerlix daily.    IV Access: PICC   Change PICC dressing every 7 days and PRN.  Use strict sterile technique   Maintain occlusive dressing unless soiled, damp, unable to assess insertion site.   Change injection caps every 7 days and/or as needed when soiled or if catheter-related infection is suspected or documented.    Always clamp the PICC tubing when not in use.  Use only 10 mL syringes to flush PICC; flush each lumen of PICC every 6 hours with 10 mL preservative-free saline flush solution    Blood draw PICC protocol:  Stop infusions to all lumens,  withdraw 10 ml of blood  with 10 ml syringe and discard. Draw blood specimen.   Flush lumen with 20 ml of preservative-free saline-flush solution and resume infusion.     Medications: Discontinue all previous medication orders, if any. See new list below.  Current Discharge Medication List      START taking these medications    Details   meropenem-0.9% sodium chloride 1 gram/50 mL PgBk Inject 50 mLs (1 g total) into the vein 3 (three) times a day for 14 days  Qty: 2100 mL, Refills: 0  Diagnosis:  Ventilator-associated pneumonia        sodium chloride 0.9% SolP 50 mL with DAPTOmycin 500 mg SolR 419.5 mg Inject 419.5 mg into the vein once daily for 14 days  Diagnosis:  Enterococcus faecalis infection in blood      sulfamethoxazole-trimethoprim 800-160mg (BACTRIM DS) 800-160 mg Tab 1 tablet by Per G Tube route 2 (two) times daily. for 14 days  Qty: 28 tablet, Refills: 0    Diagnosis:  Carbapenem resistant Acinetobacter infection      vancomycin 125 mg/5 mL Soln 5 mLs (125 mg total) by Per G Tube route 2 (two) times a day. for 14 days\  Diagnosis:  Clostridium difficile prophylaxis         CONTINUE these medications which have NOT CHANGED    Details   acetaminophen (TYLENOL) 325 MG tablet 2 tablets (650 mg total) by Per G Tube route every 4 (four) hours as needed for Pain or Temperature greater than (100.4F).  Refills: 0      acetylcysteine 100 mg/ml, 10%, (MUCOMYST) 100 mg/mL (10 %) nebulizer solution Take 4 mLs by nebulization every 8 (eight) hours.  Refills: 12      albuterol-ipratropium (DUO-NEB) 2.5 mg-0.5 mg/3 mL nebulizer solution Take 3 mLs by  nebulization every 6 (six) hours. Rescue  Qty: 1 Box, Refills: 0      artificial tears (ISOPTO TEARS) 0.5 % ophthalmic solution Place 1 drop into both eyes as needed.      ergocalciferol (ERGOCALCIFEROL) 50,000 unit Cap Take 50,000 Units by mouth every 7 days.      ferrous sulfate 300 mg (60 mg iron)/5 mL syrup Take 300 mg by mouth As instructed. Every other day      midodrine (PROAMATINE) 10 MG tablet 1 tablet (10 mg total) by Per G Tube route 3 (three) times daily.  Qty: 90 tablet, Refills: 11      polyethylene glycol (GLYCOLAX) 17 gram PwPk Take by mouth daily as needed.      scopolamine (TRANSDERM-SCOP) 1.3-1.5 mg (1 mg over 3 days) Place 1 patch onto the skin Every 3 (three) days.  Qty: 30 patch, Refills: 0         STOP taking these medications       clonazePAM (KLONOPIN) 0.5 MG tablet Comments:   Reason for Stopping:         meropenem-0.9% sodium chloride 500 mg/50 mL PgBk Comments:   Reason for Stopping:             Follow up:    Follow-up Information     [facility] Follow up.    Specialties: SNF Agency, Nursing Home Agency  Why: Nursing Home, prison  Contact information:  [place] [address]  [phone]                           Lanre Goss MD   [facility] hospitalist

## 2022-03-25 NOTE — PROGRESS NOTES
"  03/25/2022      Admit Date: 3/17/2022  Genna Felix  Pulm Progress Note    Chief Complaint   Patient presents with    hypothermia     Pt sent by [facility] for hypothermia, nursing home staff reported to EMS t6hat pt warming blanket was not working       History of Present Illness:  Pt not arousing, with tactile stimuli her upper right gaze goes to left upper gaze.  Contracted.  On vent    Pt had prior pseudomonas , esbl klebsiella, and serratia  in sputum Jan 2022.  Review of record indicates h/o sz, cirrhosis, anoxic brain injury,   c diff, chr carroll.    Pt was NSR for  3 days 1/24/2022 -- Hospital Course:   Patient is a [facility] patient was unfortunately the victim of anoxic brain injury and persistent vegetative state who presented to the hospital with hypothermia.  She underwent evaluation for sepsis and started on broad-spectrum antimicrobial therapy.  She underwent further evaluation cause of, including CT scan of the abdomen as she had a recent cholecystostomy tube, as well as chest x-ray.  Likely etiology of her sepsis was secondary to pneumonia.  She improved with broad-spectrum antimicrobial therapy, and was discharged to long-term acute care to completed full course of 10 days of IV antibiotics.  Goals of care discussion was had with the patient's family, who opted for DNR, but continuation of the current treatment.       From ERP, Dr Nino hpi today:Genna Felix is a 61 y.o. female who presents to the ED via EMS from [facility] with an onset of hypothermia.  Patient was atop of a thermal blanket in order to regulate her temperature, but the blanket malfunctioned throughout the night causing patient's temperature to drop.  She presented to the ED with a rectal temp of 89 °F.  EMS notes the patient has a "blood shot" left eye and a steady "upward gaze."  Patient presents with a drain to the RUQ, booties to bilateral feet, trach in place and Carroll catheter, per EMS.  They note patient is a "full code" " and responds with a jump to startling noises only.  No other symptoms reported at this time.  Patient has a Hx of respiratory distress, hemangioma of intra-abdominal structure, tracheostomy dependence, anoxic brain damage, PE, HTN, protein claorie malnutrition, PEG tube placement, bed bound, total self-care deficit, nursing home resident, and cardiac arrest.  PSHx includes PEG with tracheostomy placement.  HPI is limited secondary to nonverbal patient.          Progress Note  PULMONARY    Admit Date: 3/17/2022   03/25/2022      SUBJECTIVE:     3/18 not arousing, more animated  3/19 no arousing,  3/21 no new c/o  3/22 (Montesinos)- remains on vent, VS stable. She is requiring warming blanket for hypothermia. Tube feeds are held currently. Pt had CT abd/p last night. RN reports rectal probe had stool impacted around it and s/p removal pt had 3 BMs last night.  3/23- remains on vent, no new issues  3/24- continues on vent, oxygenating well. Getting BRENDAN today  3/25- continues on vent, no new issues. BRENDAN neg for vegetations    PFSH and Allergies reviewed.    OBJECTIVE:     Vitals (Most recent):  Vitals:    03/25/22 0702   BP:    Pulse: 110   Resp: (!) 32   Temp: 98.24 °F (36.8 °C)       Vitals (24 hour range):  Temp:  [96.26 °F (35.7 °C)-99.68 °F (37.6 °C)]   Pulse:  []   Resp:  [14-43]   BP: (110-169)/(60-92)   SpO2:  [95 %-98 %]       Intake/Output Summary (Last 24 hours) at 3/25/2022 0955  Last data filed at 3/25/2022 0400  Gross per 24 hour   Intake 1751.63 ml   Output 2680 ml   Net -928.37 ml          Physical Exam:  The patient's neuro status (alertness,orientation,cognitive function,motor skills,), pharyngeal exam (oral lesions, hygiene, abn dentition,), Neck (jvd,mass,thyroid,nodes in neck and above/below clavicle),RESPIRATORY(symmetry,effort,fremitus,percussion,auscultation),  Cor(rhythm,heart tones including gallops,perfusion,edema)ABD(distention,hepatic&splenomegaly,tenderness,masses),  Skin(rash,cyanosis),Psyc(affect,judgement,).  Exam negative except for these pertinent findings:    Eyes open spontaneously, moves jaw, doesn't track or verbalize.  Trach in place  Bilateral coarse breath sounds   Abdomen distended, +BS, ángel drain RUQ and PEG in place  Hamilton in place  Bilateral lower and upper ext 2+ pitting edema  Arms contracted    Radiographs reviewed: view by direct vision    CT abd/p 3/22-   1. Bibasilar airspace disease which could reflect atelectasis aspirate or pneumonia.  Trace bilateral pleural effusions.  2.  Multiple gallstones with cholecystostomy.  Gallbladder is decompressed.  3. Urinary bladder wall thickening could relate to inadequate distension.  Cystitis or outlet obstruction could also have this appearance.  4. Diffuse rectal wall thickening is presumably from under distension although proctitis possible in the appropriate clinical setting.      TTE 3/21-   · The left ventricle is normal in size with  · The estimated ejection fraction is 65%.  · Normal left ventricular diastolic function.  · Normal right ventricular size with normal right ventricular systolic function.  · There is mild pulmonary hypertension.  · Normal central venous pressure (3 mmHg).  · The estimated PA systolic pressure is 48 mmHg.  · On long-axis view there appears to be 1 cm echo artifact on the anterior leaf of the mitral valve left atrial side that is not seen on any other view-this probably represents mirror artifact  · Mild tricuspid regurgitation.        Labs     Recent Labs   Lab 03/25/22  0339   WBC 6.10   HGB 8.6*   HCT 28.5*        Recent Labs   Lab 03/25/22  0339      K 3.8      CO2 24   BUN 9   CREATININE 0.5   GLU 95   CALCIUM 9.2   MG 1.7   PHOS 2.5*   AST 35   ALT 49*   ALKPHOS 194*   BILITOT 0.5   PROT 7.6   ALBUMIN 2.4*   No results for input(s): PH, PCO2, PO2, HCO3 in the last 24 hours.  Microbiology Results (last 7 days)     Procedure Component Value Units Date/Time     Blood culture [[id number]] Collected: 03/21/22 1428    Order Status: Completed Specimen: Blood Updated: 03/24/22 2222     Blood Culture, Routine No Growth to date      No Growth to date      No Growth to date      No Growth to date    Aerobic culture [[id number]]  (Abnormal)  (Susceptibility) Collected: 03/18/22 1630    Order Status: Completed Specimen: Wound from Elbow, Right Updated: 03/23/22 1427     Aerobic Bacterial Culture ACINETOBACTER BAUMANNII   Moderate        METHICILLIN RESISTANT STAPHYLOCOCCUS AUREUS  Many        PROTEUS MIRABILIS  Few      Blood culture x two cultures. Draw prior to antibiotics. [[id number]] Collected: 03/17/22 0854    Order Status: Completed Specimen: Blood from Peripheral, Right Hand Updated: 03/22/22 1412     Blood Culture, Routine No growth after 5 days.    Narrative:      Aerobic and anaerobic    Urine Culture High Risk [[id number]]  (Abnormal)  (Susceptibility) Collected: 03/18/22 1010    Order Status: Completed Specimen: Urine, Catheterized Updated: 03/22/22 0923     Urine Culture, Routine PSEUDOMONAS AERUGINOSA   10,000 - 49,999 cfu/ml      Narrative:      Indicated criteria for high risk culture:->Other  Other (specify):->sepsis    Urine culture [[id number]] Collected: 03/20/22 0100    Order Status: Completed Specimen: Urine Updated: 03/21/22 1152     Urine Culture, Routine No growth    Narrative:      Specimen Source->Urine    Culture, Respiratory with Gram Stain [[id number]]  (Abnormal)  (Susceptibility) Collected: 03/17/22 1337    Order Status: Completed Specimen: Respiratory from Tracheal Aspirate Updated: 03/21/22 0912     Respiratory Culture No S aureus or Pseudomonas isolated.      SERRATIA MARCESCENS  Many        PROVIDENCIA STUARTII  Many  Normal respiratory kenny also present       Gram Stain (Respiratory) Rare WBC's     Gram Stain (Respiratory) Many Gram positive cocci     Gram Stain (Respiratory) Many Gram negative rods    Blood culture x two cultures. Draw prior  to antibiotics. [[id number]]  (Abnormal)  (Susceptibility) Collected: 03/17/22 0844    Order Status: Completed Specimen: Blood Updated: 03/20/22 1249     Blood Culture, Routine Gram stain aer bottle: Gram positive cocci in chains resembling Strep       Results called to and read back by: RASHEED LACEY RN  03/18/2022        08:06      ENTEROCOCCUS FAECALIS    Narrative:      Aerobic and anaerobic          Impression:  Active Hospital Problems    Diagnosis  POA    *VAP (ventilator-associated pneumonia) [J95.851]  Yes    Pseudomonas urinary tract infection [N39.0, B96.5]  Yes    Cholelithiasis with chronic cholecystitis [K80.10]  Yes     Indwelling cholecystostomy tube      Pressure injury of right elbow, stage 4 [L89.014]  Yes    Cirrhosis of liver [K74.60]  Yes    Chronic respiratory failure with hypoxia [J96.11]  Yes    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]  Not Applicable    Persistent vegetative state [R40.3]  Yes    Anemia of chronic disease [D63.8]  Yes      Resolved Hospital Problems    Diagnosis Date Resolved POA    Leaking PEG tube [K94.23] 03/25/2022 Yes    Sepsis [A41.9] 03/25/2022 Yes             .        Plan: t min 89, p 58 to 107, merrem/vanc, midodrine, lovenox 40/d,  28% ox,wbc 3.7, procal 0.04, u/a pos for blood and wbc, cxr min rll opacification-- ct abd and cxr 1/26 with rll density from apparent atelectasis.     Urine culture positive for proteus 1/5/2022 s to merrem.  Not clear if had c diff prophylaxsis recent admits but  c diff Pos May 2021.    I do not see clear pneumonia with cxr pattern and procal.  Would monitor on merrem.       H/o prior c diff -- consider prophylaxsis for c diff?  Monitor.    3/18 tm 100.6, p 124,   I/o 1503/1550  merrem and vanc  lovenox       U/a abn,procal low and cxr stable from Jan-- would rec urine culture if not done    Pt called pneumonia but findings are minimal  -- await cultures, adjust abx, return to [place] when stable.      3/19 tm 100.04, p to 119,  merrem and vanc, rm air,   gnr sputum    Will recheck later, need sensitivities to determine dc abx.  3/21no fever, vss, no fever,    providentia and serratia in sputum, pseudomonas in urine <50k, elbow wound with gnr/staph with sensitivities pending.    merrem adequate for providentia and serratia-- pseudomonas  and not significant at this time.  Continue vanc for now for elbow wound.    Today day 5 merrem, day 7 adequate to stop.    Above from Dr London and below from Dr Montesinos:    - continue vent support no weaning  - multiple infections are being addressed (pneumonia, wound, bacteremia and UTI)- per ID  - mucomyst w/ duo nebs q12h  - bowel regimen and management of feeds per GI  - lovenox for DVT prophylaxis  - midodrine continue  - wound care  - plan back to [facility] today      Dora Montesinos MD  Pulmonary & Critical Care Medicine

## 2022-03-25 NOTE — PROGRESS NOTES
Genna Felix [id number] is a 61 y.o. female who has been consulted for vancomycin dosing.    Pharmacy consult for vancomycin dosing is no longer required.  Vancomycin was discontinued.    Thank you for allowing us to participate in this patient's care.     Koffi Spencer

## 2022-03-25 NOTE — NURSING
Follow up skin assessment done at this time. Right hand is swollen with an intact blister in place at this assessment. Right elbow unstageable wound remains with exposed bone and undermining noted. There is continued dry flaky skin to the palms of both hands. All fingers are contracted but not drawn into her palms at this hospital stay. Bilateral heels remain intact and scar tissue to buttocks/sacral areas remain unchanged.      Right elbow wound measures 3cm x 2cm x 0.4cm with undermining at 1.5cm from 7-11 o'clock.

## 2022-03-25 NOTE — PLAN OF CARE
Problem: Aspiration (Enteral Nutrition)  Goal: Absence of Aspiration Signs and Symptoms  Outcome: Ongoing, Progressing     Problem: Device-Related Complication Risk (Enteral Nutrition)  Goal: Safe, Effective Therapy Delivery  Outcome: Ongoing, Progressing     Problem: Feeding Intolerance (Enteral Nutrition)  Goal: Feeding Tolerance  Outcome: Ongoing, Progressing     Problem: Adjustment to Illness (Sepsis/Septic Shock)  Goal: Optimal Coping  Outcome: Ongoing, Progressing     Problem: Infection Progression (Sepsis/Septic Shock)  Goal: Absence of Infection Signs and Symptoms  Outcome: Ongoing, Progressing       No acute events overnight, Vital signs stable. carroll leaked 9cc noted in bulb, added 1  cc of NS. Pt still requiring warming blanket intermittently. Trach midline,  bilary drain and rectal tube intact and draining properly. Pt to return to [facility] today, unclear if patient will require PICC line to remain in place on discharge. Safety maintained, No acute distress noted @ this time

## 2022-03-25 NOTE — PLAN OF CARE
CM faxed facility transfer orders, packet, and AVS to [facility] [phone] Attention Kimberly (at her request).    CM awaiting response on when and who to call report to.    1222 CM called [facility], transferred to Kimberly, no answer, left message with Aide regarding update on the patients return to [facility] today.  CM will follow up.    1226 TARA received a callback from Kimberly at [facility], paperwork received and intact, nurse can call report to Damon at [phone], patient going to room 107.  Transportation has been arranged with a pickup time of 1:30 pm.       03/25/22 1132   Post-Acute Status   Post-Acute Authorization Placement   Post-Acute Placement Status Referrals Sent

## 2022-03-26 LAB — BACTERIA BLD CULT: NORMAL

## 2022-03-26 NOTE — SUBJECTIVE & OBJECTIVE
Interval History:  No change in status.  Stable for discharge back to [facility].  No valvular vegetations on BRENDAN.    Review of Systems   Unable to perform ROS: Patient unresponsive   Objective:     Vital Signs (Most Recent):  Temp: 97.16 °F (36.2 °C) (03/25/22 2029)  Pulse: 89 (03/25/22 2029)  Resp: (!) 24 (03/25/22 2029)  BP: 126/63 (03/25/22 0700)  SpO2: 96 % (03/25/22 2029)   Vital Signs (24h Range):  Temp:  [97.16 °F (36.2 °C)-99.68 °F (37.6 °C)] 97.16 °F (36.2 °C)  Pulse:  [] 89  Resp:  [21-43] 24  SpO2:  [95 %-98 %] 96 %  BP: (120-138)/(63-79) 126/63     Weight: 70.9 kg (156 lb 4.9 oz)  Body mass index is 30.53 kg/m².    Intake/Output Summary (Last 24 hours) at 3/25/2022 2222  Last data filed at 3/25/2022 1647  Gross per 24 hour   Intake 1534.67 ml   Output 1280 ml   Net 254.67 ml      Physical Exam  Constitutional:       General: She is not in acute distress.  Eyes:      General:         Right eye: No discharge.         Left eye: No discharge.   Neck:      Vascular: No JVD.   Cardiovascular:      Rate and Rhythm: Normal rate and regular rhythm.   Pulmonary:      Breath sounds: Normal breath sounds.      Comments: Tracheostomy connected to ventilator  Abdominal:      General: Abdomen is flat. Bowel sounds are normal. There is no distension.      Palpations: Abdomen is soft.      Comments: Cholecystostomy drain in RUQ.  Gastrostomy in LUQ.   Musculoskeletal:      Right lower leg: No edema.      Left lower leg: No edema.   Skin:     General: Skin is warm and moist.      Findings: No rash.   Neurological:      Mental Status: She is alert.      Comments: Not responding to questions.  Not following commands.         Significant Labs: All pertinent labs within the past 24 hours have been reviewed.    Significant Imaging: I have reviewed all pertinent imaging results/findings within the past 24 hours.

## 2022-03-26 NOTE — ASSESSMENT & PLAN NOTE
Was present on hospital day 1.  Was admitted with cholecystostomy tube.  Will leave it in place, which is what surgeon recommended.

## 2022-03-26 NOTE — PLAN OF CARE
Awake, alert, looking around, vented, diminished  lung sounds,  ekg shows  sr/st, tube feeding in progress, gallbladder drain, carroll and  felexi-pro in use

## 2022-03-26 NOTE — ASSESSMENT & PLAN NOTE
Continue antibiotics.  Monitor for improvement.    Antibiotics (From admission, onward)            Start     Stop Route Frequency Ordered    03/24/22 1630  DAPTOmycin (CUBICIN) 420 mg in sodium chloride 0.9% IVPB         -- IV Every 24 hours (non-standard times) 03/24/22 1523    03/23/22 2100  sulfamethoxazole-trimethoprim 800-160mg per tablet 1 tablet         04/06 2059 PER G TUBE 2 times daily 03/23/22 1610    03/21/22 2100  meropenem-0.9% sodium chloride 1 g/50 mL IVPB         -- IV Every 8 hours (non-standard times) 03/21/22 1420    03/17/22 2100  vancomycin 125 mg/5 mL oral solution 125 mg         -- PER G TUBE 2 times daily 03/17/22 2008

## 2022-03-26 NOTE — PROGRESS NOTES
[facility] Hospital Medicine  Progress Note    Patient Name: Genna Felix  MRN: [id number]  Patient Class: IP- Inpatient   Admission Date: 3/17/2022  Length of Stay: 8 days  Attending Physician: Lanre Goss MD  Primary Care Provider: Primary Doctor No        Subjective:     Principal Problem:VAP (ventilator-associated pneumonia)        HPI:  No notes on file    Overview/Hospital Course:  No notes on file    Interval History:  No change in status.  Stable for discharge back to [place].  No valvular vegetations on BRENDAN.    Review of Systems   Unable to perform ROS: Patient unresponsive   Objective:     Vital Signs (Most Recent):  Temp: 97.16 °F (36.2 °C) (03/25/22 2029)  Pulse: 89 (03/25/22 2029)  Resp: (!) 24 (03/25/22 2029)  BP: 126/63 (03/25/22 0700)  SpO2: 96 % (03/25/22 2029)   Vital Signs (24h Range):  Temp:  [97.16 °F (36.2 °C)-99.68 °F (37.6 °C)] 97.16 °F (36.2 °C)  Pulse:  [] 89  Resp:  [21-43] 24  SpO2:  [95 %-98 %] 96 %  BP: (120-138)/(63-79) 126/63     Weight: 70.9 kg (156 lb 4.9 oz)  Body mass index is 30.53 kg/m².    Intake/Output Summary (Last 24 hours) at 3/25/2022 2222  Last data filed at 3/25/2022 1647  Gross per 24 hour   Intake 1534.67 ml   Output 1280 ml   Net 254.67 ml      Physical Exam  Constitutional:       General: She is not in acute distress.  Eyes:      General:         Right eye: No discharge.         Left eye: No discharge.   Neck:      Vascular: No JVD.   Cardiovascular:      Rate and Rhythm: Normal rate and regular rhythm.   Pulmonary:      Breath sounds: Normal breath sounds.      Comments: Tracheostomy connected to ventilator  Abdominal:      General: Abdomen is flat. Bowel sounds are normal. There is no distension.      Palpations: Abdomen is soft.      Comments: Cholecystostomy drain in RUQ.  Gastrostomy in LUQ.   Musculoskeletal:      Right lower leg: No edema.      Left lower leg: No edema.   Skin:     General: Skin is warm and moist.      Findings:  No rash.   Neurological:      Mental Status: She is alert.      Comments: Not responding to questions.  Not following commands.         Significant Labs: All pertinent labs within the past 24 hours have been reviewed.    Significant Imaging: I have reviewed all pertinent imaging results/findings within the past 24 hours.      Assessment/Plan:      * VAP (ventilator-associated pneumonia)  Continue antibiotics.  Monitor for improvement.    Antibiotics (From admission, onward)            Start     Stop Route Frequency Ordered    03/24/22 1630  DAPTOmycin (CUBICIN) 420 mg in sodium chloride 0.9% IVPB         -- IV Every 24 hours (non-standard times) 03/24/22 1523    03/23/22 2100  sulfamethoxazole-trimethoprim 800-160mg per tablet 1 tablet         04/06 2059 PER G TUBE 2 times daily 03/23/22 1610    03/21/22 2100  meropenem-0.9% sodium chloride 1 g/50 mL IVPB         -- IV Every 8 hours (non-standard times) 03/21/22 1420    03/17/22 2100  vancomycin 125 mg/5 mL oral solution 125 mg         -- PER G TUBE 2 times daily 03/17/22 2008              Cholelithiasis with chronic cholecystitis  Was present on hospital day 1.  Was admitted with cholecystostomy tube.  Will leave it in place, which is what surgeon recommended.      Pseudomonas urinary tract infection  Was present on hospital day 1.  Continue IVAB.  Infectious disease consultant on the case.      Pressure injury of right elbow, stage 4  Was present on hospital day 1.  Wound care nurse evaluated it and recommended methods of treatment.      Cirrhosis of liver  Chronic.  Liver functions appear stable.    Lab Results   Component Value Date    ALT 49 (H) 03/25/2022    AST 35 03/25/2022    ALKPHOS 194 (H) 03/25/2022    BILITOT 0.5 03/25/2022           Chronic respiratory failure with hypoxia  Continue mechanical ventilator support.  Monitor oximetry readings.  Consulting with pulmonologist to help with management.    Tracheostomy dependence  Chronic.  Continue routine  care.      Persistent vegetative state  Chronic.      Anemia of chronic disease  Patient's anemia is currently controlled. Has not received any PRBCs to date.. Etiology likely d/t chronic disease  Current CBC reviewed-   Lab Results   Component Value Date    HGB 8.6 (L) 03/25/2022    HCT 28.5 (L) 03/25/2022     Monitor serial CBC and transfuse if patient becomes hemodynamically unstable, symptomatic or H/H drops below 7/21.           VTE Risk Mitigation (From admission, onward)         Ordered     enoxaparin injection 40 mg  Daily         03/17/22 1013     IP VTE HIGH RISK PATIENT  Once         03/17/22 1013     Place sequential compression device  Until discontinued         03/17/22 1013                Discharge Planning   [place]: 3/25/2022     Code Status: Full Code   Is the patient medically ready for discharge?:     Reason for patient still in hospital (select all that apply): Pending disposition  Discharge Plan A: Return to nursing home   Discharge Delays: (!) Ambulance Transport/Facility Transport        Lanre Goss MD  Department of Hospital Medicine   [facility]

## 2022-03-26 NOTE — NURSING TRANSFER
Nursing Transfer Note      3/25/2022     Reason patient is being transferred: discharge to [facility]    Transfer: [facility] ICU to [facility]    Transfer via: [facility]     Transfer with: Stretcher- Vent, monitor    Transported by [facility]    Medicines sent: None    Any special needs or follow-up needed: none    Chart send with patient: yes    Patient reassessed at: 3/25/2022 2300

## 2022-03-26 NOTE — ASSESSMENT & PLAN NOTE
Patient's anemia is currently controlled. Has not received any PRBCs to date.. Etiology likely d/t chronic disease  Current CBC reviewed-   Lab Results   Component Value Date    HGB 8.6 (L) 03/25/2022    HCT 28.5 (L) 03/25/2022     Monitor serial CBC and transfuse if patient becomes hemodynamically unstable, symptomatic or H/H drops below 7/21.

## 2022-03-26 NOTE — CARE UPDATE
03/25/22 2029   Patient Assessment/Suction   Level of Consciousness (AVPU) alert   Respiratory Effort Unlabored   Expansion/Accessory Muscles/Retractions no use of accessory muscles;no retractions;expansion symmetric   All Lung Fields Breath Sounds Anterior:;Lateral:;coarse;rhonchi   Rhythm/Pattern, Respiratory assisted mechanically   Cough Frequency with stimulation   Cough Type assisted   Suction Method tracheal   $ Suction Charges Inline Suction Procedure Stat Charge   Secretions Amount small   Secretions Color white   Secretions Characteristics thick   Sputum Collection sample obtained per cough   Aspirate Toleration TENA (no adverse reactions)   Sent to the lab? Yes   PRE-TX-O2   O2 Device (Oxygen Therapy) ventilator   $ Is the patient on Low Flow Oxygen? Yes   Oxygen Concentration (%) 21   SpO2 96 %   Pulse Oximetry Type Continuous   $ Pulse Oximetry - Multiple Charge Pulse Oximetry - Multiple   Pulse 89   Resp (!) 24   Temp 97.16 °F (36.2 °C)   ETCO2   ETCO2 (mmHg) 28 mmHg   Aerosol Therapy   $ Aerosol Therapy Charges Aerosol Treatment   Respiratory Treatment Status (SVN) given   Treatment Route (SVN) in-line   Patient Position (SVN) HOB elevated   Post Treatment Assessment (SVN) breath sounds unchanged   Signs of Intolerance (SVN) none   Breath Sounds Post-Respiratory Treatment   Throughout All Fields Post-Treatment All Fields   Throughout All Fields Post-Treatment no change   Post-treatment Heart Rate (beats/min) 92   Post-treatment Resp Rate (breaths/min) 20        Surgical Airway Portex Cuffed   No placement date or time found.   Present Prior to Hospital Arrival?: Yes  Brand: Portex  Airway Device Size: 8.0  Style: Cuffed   Cuff Pressure   (mop)   Speaking Valve Usage Not wearing   Status Secured   Site Assessment Clean;Dry   Ties Assessment Clean;Dry;Intact;Secure   Airway Safety   Ambu bag with the patient? Yes, Adult Ambu   Is mask with the patient? Yes, Adult Mask   Suction set is at the bedside?  Yes   Extra trach at bedside? No   Is obturator available? No   Vent Select   Conventional Vent Y   Charged w/in last 24h YES   Preset Conventional Ventilator Settings   Vent Type NKV-550   Ventilation Type VC   Vent Mode A/CMV-VC   Humidity HME   Set Rate 14 BPM   Vt Set 400 mL   PEEP/CPAP 4.8 cmH20   Waveform RAMP   Insp Time 1 Sec(s)   Trigger Sensitivity Flow/I-Trigger 2.5 L/min   Patient Ventilator Parameters   Resp Rate Total 21 br/min   Peak Airway Pressure 41.5 cmH2O   Exhaled Vt 398 mL   Total Ve 8.03 mL   I:E Ratio Measured 1:2.3   Inspired Tidal Volume (VTI) 415 mL   Conventional Ventilator Alarms   Apnea Rate 14   Apnea Volume (mL) 400 mL   Ready to Wean/Extubation Screen   FIO2<=50 (chart decimal) 0.21   MV<16L (chart vol.) 8.03   PEEP <=8 (chart #) 4.8

## 2022-03-26 NOTE — ASSESSMENT & PLAN NOTE
Chronic.  Liver functions appear stable.    Lab Results   Component Value Date    ALT 49 (H) 03/25/2022    AST 35 03/25/2022    ALKPHOS 194 (H) 03/25/2022    BILITOT 0.5 03/25/2022

## 2022-03-27 NOTE — HPI
61F h/o PE, anoxic brain injury, now vent/trach-PEG feeding dependent also with ?cholecystostomy tube in place who is brought from [place] for hypothermia found to have a new pneumonia.         On the morning of admission trended found the patient with her warming blanket not functioning.  At that time she was found to be hypothermic leading to their transferring her here.     All information derived from ER team and [place] chart given patient's current cognitive state

## 2022-03-27 NOTE — DISCHARGE SUMMARY
[facility] Medicine  Discharge Summary      Patient Name: Genna Felix  MRN: [id number]  Patient Class: IP- Inpatient  Admission Date: 3/17/2022  Hospital Length of Stay: 8 days  Discharge Date and Time: 3/25/2022 11:10 PM  Attending Physician: Lanre Goss MD   Discharging Provider: Lanre Goss MD  Primary Care Provider: Primary Doctor No      HPI:   61F h/o PE, anoxic brain injury, now vent/trach-PEG feeding dependent also with ?cholecystostomy tube in place who is brought from [place] for hypothermia found to have a new pneumonia.         On the morning of admission trended found the patient with her warming blanket not functioning.  At that time she was found to be hypothermic leading to their transferring her here.     All information derived from ER team and [place] chart given patient's current cognitive state      * No surgery found *      Hospital Course:   Was admitted with ventilator-associated pneumonia, acute UTI, and severe sepsis.  Was put on IVAB.  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  We consulted GI due to possible PEG dysfunction, and a contrast study of the G-tube showed it to be in good position.  Tube feeds were resumed.  A blood culture ended up growing Enterococcus.  Infectious Disease service was consulted.  The source of the Enterococcus was determined to be the gall bladder.  When pt was admitted to our service, she had a cholecystostomy tube, which had been placed at [facility].  We consulted with general surgery regarding all this, and he recommended keeping the tube in place.  There was no indication to perform surgery.  Antibiotics continued.  Several cultures from different body sources all grew pathogens.  ID helped us with antibiotic treatment.  There were no further indications to keep pt hospitalized.  She was discharged back to [facility] for long term  care.    Physical Exam  Constitutional:       General: She is not in acute distress.  Eyes:      General:         Right eye: No discharge.         Left eye: No discharge.   Neck:      Vascular: No JVD.   Cardiovascular:      Rate and Rhythm: Normal rate and regular rhythm.   Pulmonary:      Breath sounds: Normal breath sounds.      Comments: Tracheostomy connected to ventilator  Abdominal:      General: Abdomen is flat. Bowel sounds are normal. There is no distension.      Palpations: Abdomen is soft.      Comments: Cholecystostomy drain in RUQ.  Gastrostomy in LUQ.        Goals of Care Treatment Preferences:  Code Status: Full Code       LaPOST: Yes           Consults:   Consults (From admission, onward)        Status Ordering Provider     Inpatient consult to General Surgery  Once        Provider:  Maksim Huynh MD    Completed DAHLIA GUERRERO     Inpatient consult to Cardiology  Once        Provider:  Rogelio Last MD    Completed DAHLIA GUERRERO     Inpatient consult to Wound Care Physician  Once        Provider:  Rodney Hernandez DO    Completed DAHLIA GUERRERO     Inpatient consult to Infectious Diseases  Once        Provider:  Melissa Ruiz MD    Completed HERMAN MURPHY     Inpatient consult to Gastroenterology  Once        Provider:  Mg Duarte MD    Completed HERMAN MURPHY     IP consult to case management  Once        Provider:  (Not yet assigned)    Completed DAHLIA GUERRERO     Inpatient consult to PICC team ([facility])  Once        Provider:  (Not yet assigned)    Completed DAHLIA GUERRERO     Inpatient consult to Registered Dietitian/Nutritionist  Once        Provider:  (Not yet assigned)    Completed DAHLIA GUERRERO     Inpatient consult to Pulmonology  Once        Provider:  Jackson London MD    Completed DAHLIA GUERRERO          Final Active Diagnoses:    Diagnosis Date Noted POA    PRINCIPAL PROBLEM:  VAP (ventilator-associated pneumonia) [J95.851]  03/18/2022 Yes    Pseudomonas urinary tract infection [N39.0, B96.5] 03/25/2022 Yes    Cholelithiasis with chronic cholecystitis [K80.10] 03/25/2022 Yes    Pressure injury of right elbow, stage 4 [L89.014] 03/18/2022 Yes    Cirrhosis of liver [K74.60] 12/27/2021 Yes    Chronic respiratory failure with hypoxia [J96.11] 05/10/2021 Yes    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    Specialties: SNF Agency, Nursing Home Agency  Why: Nursing Home, prison  Contact information:  [place] [address]  [phone]                       Patient Instructions:      Tube Feedings/Formulas   Order Comments: 110 ml water flush q 4 hr and Benny BID     Order Specific Question Answer Comments   Select Adult Formula: Isosource 1.5 ulises    Route: Gastrostomy    Formula Rate (mL/hr): 50    Additives/Modulars: Beneprotein (protein powder)    Additives/Modulars: Other (see comments)      Activity as tolerated       Significant Diagnostic Studies:   Lab Results   Component Value Date    WBC 6.10 03/25/2022    HGB 8.6 (L) 03/25/2022    HCT 28.5 (L) 03/25/2022    MCV 84 03/25/2022     03/25/2022       BMP  Lab Results   Component Value Date     03/25/2022    K 3.8 03/25/2022     03/25/2022    CO2 24 03/25/2022    BUN 9 03/25/2022    CREATININE 0.5 03/25/2022    CALCIUM 9.2 03/25/2022    ANIONGAP 10 03/25/2022    ESTGFRAFRICA >60 03/25/2022    EGFRNONAA >60 03/25/2022     Results for orders placed or performed during  the hospital encounter of 03/17/22 (from the past 336 hour(s))   Blood culture x two cultures. Draw prior to antibiotics.    Specimen: Blood   Result Value Ref Range    Blood Culture, Routine       Gram stain aer bottle: Gram positive cocci in chains resembling Strep     Blood Culture, Routine       Results called to and read back by: RASHEED LACEY RN  03/18/2022      Blood Culture, Routine 08:06     Blood Culture, Routine ENTEROCOCCUS FAECALIS (A)        Susceptibility    Enterococcus faecalis - CULTURE, BLOOD     Ampicillin <=2 Sensitive mcg/mL     Gentamicin Synergy Screen <=500 Sensitive mcg/mL     Tetracycline <=4 Sensitive mcg/mL     Vancomycin 2 Sensitive mcg/mL   Blood culture x two cultures. Draw prior to antibiotics.    Specimen: Peripheral, Right Hand; Blood   Result Value Ref Range    Blood Culture, Routine No growth after 5 days.    Culture, Respiratory with Gram Stain    Specimen: Tracheal Aspirate; Respiratory   Result Value Ref Range    Respiratory Culture No S aureus or Pseudomonas isolated.     Respiratory Culture SERRATIA MARCESCENS  Many   (A)     Respiratory Culture (A)      PROVIDENCIA STUARTII  Many  Normal respiratory kenny also present      Gram Stain (Respiratory) Rare WBC's     Gram Stain (Respiratory) Many Gram positive cocci     Gram Stain (Respiratory) Many Gram negative rods        Susceptibility    Providencia stuartii - CULTURE, RESPIRATORY     Amp/Sulbactam 16/8 Intermediate mcg/mL     Amikacin <=16 Sensitive mcg/mL     Ceftriaxone 8 Resistant mcg/mL     Ciprofloxacin >2 Resistant mcg/mL     Cefepime <=2 Sensitive mcg/mL     Ertapenem <=0.5 Sensitive mcg/mL     Gentamicin >8 Resistant mcg/mL     Levofloxacin >4 Resistant mcg/mL     Meropenem <=1 Sensitive mcg/mL     Piperacillin/Tazo <=16 Sensitive mcg/mL     Trimeth/Sulfa >2/38 Resistant mcg/mL     Tobramycin >8 Resistant mcg/mL    Serratia marcescens - CULTURE, RESPIRATORY     Amikacin <=16 Sensitive mcg/mL     Ceftriaxone 8  Resistant mcg/mL     Ciprofloxacin >2 Resistant mcg/mL     Cefepime <=2 Sensitive mcg/mL     Ertapenem <=0.5 Sensitive mcg/mL     Gentamicin <=4 Sensitive mcg/mL     Levofloxacin <=2 Sensitive mcg/mL     Meropenem <=1 Sensitive mcg/mL     Piperacillin/Tazo <=16 Sensitive mcg/mL     Trimeth/Sulfa <=2/38 Sensitive mcg/mL     Tobramycin >8 Resistant mcg/mL   Urine Culture High Risk    Specimen: Urine, Catheterized   Result Value Ref Range    Urine Culture, Routine (A)      PSEUDOMONAS AERUGINOSA   10,000 - 49,999 cfu/ml         Susceptibility    PSEUDOMONAS AERUGINOSA  - CULTURE, URINE     Amikacin <=16 Sensitive mcg/mL     Ciprofloxacin >2 Resistant mcg/mL     Cefepime >16 Resistant mcg/mL     Gentamicin <=4 Sensitive mcg/mL     Levofloxacin >4 Resistant mcg/mL     Meropenem >8 Resistant mcg/mL     Piperacillin/Tazo >64 Resistant mcg/mL     Tobramycin <=4 Sensitive mcg/mL     Ceftolozane/Tazobactam 2 Sensitive mcg/mL     Ceftazidime/Avibactam 24 Resistant mcg/mL     Colistin* 0.5 Sensitive mcg/mL      * For research use only   Aerobic culture    Specimen: Elbow, Right; Wound   Result Value Ref Range    Aerobic Bacterial Culture ACINETOBACTER BAUMANNII   Moderate   (A)     Aerobic Bacterial Culture (A)      METHICILLIN RESISTANT STAPHYLOCOCCUS AUREUS  Many      Aerobic Bacterial Culture PROTEUS MIRABILIS  Few   (A)        Susceptibility    Proteus mirabilis - CULTURE, AEROBIC  (SPECIFY SOURCE)     Amp/Sulbactam >16/8 Resistant mcg/mL     Amikacin <=16 Sensitive mcg/mL     Ampicillin >16 Resistant mcg/mL     Amox/K Clav'ate >16/8 Resistant mcg/mL     Ceftriaxone 32 Resistant mcg/mL     Cefazolin >16 Resistant mcg/mL     Ciprofloxacin <=1 Sensitive mcg/mL     Cefepime 16 Intermediate mcg/mL     Ertapenem <=0.5 Sensitive mcg/mL     Gentamicin >8 Resistant mcg/mL     Levofloxacin <=2 Sensitive mcg/mL     Meropenem <=1 Sensitive mcg/mL     Piperacillin/Tazo <=16 Sensitive mcg/mL     Trimeth/Sulfa >2/38 Resistant  mcg/mL     Tobramycin >8 Resistant mcg/mL    Methicillin resistant Staphylococcus aureus - CULTURE, AEROBIC  (SPECIFY SOURCE)     Clindamycin >4 Resistant mcg/mL     Erythromycin >4 Resistant mcg/mL     Oxacillin >2 Resistant mcg/mL     Penicillin 8 Resistant mcg/mL     Trimeth/Sulfa <=0.5/9.5 Sensitive mcg/mL     Tetracycline <=4 Sensitive mcg/mL     Vancomycin 1 Sensitive mcg/mL    ACINETOBACTER BAUMANNII  - CULTURE, AEROBIC  (SPECIFY SOURCE)     Amp/Sulbactam <=8/4 Sensitive mcg/mL     Amikacin <=16 Sensitive mcg/mL     Ceftriaxone 32 Intermediate mcg/mL     Ciprofloxacin >2 Resistant mcg/mL     Cefepime >16 Resistant mcg/mL     Gentamicin >8 Resistant mcg/mL     Levofloxacin >4 Resistant mcg/mL     Meropenem >8 Resistant mcg/mL     Trimeth/Sulfa <=2/38 Sensitive mcg/mL     Tetracycline >8 Resistant mcg/mL     Tobramycin <=4 Sensitive mcg/mL     Colistin* 0.25 Sensitive mcg/mL      * For research use only   Urine culture    Specimen: Urine   Result Value Ref Range    Urine Culture, Routine No growth    Blood culture    Specimen: Blood   Result Value Ref Range    Blood Culture, Routine No Growth to date     Blood Culture, Routine No Growth to date     Blood Culture, Routine No Growth to date     Blood Culture, Routine No Growth to date     Blood Culture, Routine No Growth to date    ]  Results for orders placed during the hospital encounter of 03/17/22    CT Abdomen Pelvis With Contrast    Narrative  EXAMINATION:  CT ABDOMEN PELVIS WITH CONTRAST    CLINICAL HISTORY:  Abdominal abscess/infection suspected;    TECHNIQUE:  Low dose axial images, sagittal and coronal reformations were obtained from the lung bases to the pubic symphysis following the IV administration of 75 mL of Omnipaque 350 and the oral administration of 700 mL of Omnipaque 9.    COMPARISON:  01/26/2022    FINDINGS:  There is bilateral lower lobe airspace disease.  Bilateral pleural effusions.  Normal size heart.  Gallbladder collapsed with  multiple stones and a cholecystostomy coiled near the tip.  No biliary dilation.    Simple cyst upper pole right kidney.  Mild pancreas atrophy.  Remaining solid abdominal organs are unremarkable.    There is enteric contrast.  There is a PEG tube.  No dilated bowel loops.  Normal appendix.  There is apparent rectal wall thickening.    Atherosclerosis.  No focal abdominal fluid collection.  Small fat containing umbilical defect.  Bladder collapsed around a Hamilton catheter with small volume intraluminal air.  There is another linear probe in the midline pelvis presumably a temperature monitor.  Prominent but nonenlarged right obturator node 9 mm series 2, image 127.  Prominent fat deposition left inguinal canal.  Body wall edema.    Bulky heterotopic calcification about both hips.  Small right hip joint effusion.  Superior endplate compression fracture at L2 and T10, both chronic.    Impression  1. Bibasilar airspace disease which could reflect atelectasis aspirate or pneumonia.  Trace bilateral pleural effusions.  2.  Multiple gallstones with cholecystostomy.  Gallbladder is decompressed.  3. Urinary bladder wall thickening could relate to inadequate distension.  Cystitis or outlet obstruction could also have this appearance.  4. Diffuse rectal wall thickening is presumably from under distension although proctitis possible in the appropriate clinical setting.      Electronically signed by: Reji Burns  Date:    03/22/2022  Time:    09:21  ]  Pending Diagnostic Studies:     None         Medications:            START taking these medications     Details   meropenem-0.9% sodium chloride 1 gram/50 mL PgBk Inject 50 mLs (1 g total) into the vein 3 (three) times a day for 14 days  Qty: 2100 mL, Refills: 0  Diagnosis:  Ventilator-associated pneumonia          sodium chloride 0.9% SolP 50 mL with DAPTOmycin 500 mg SolR 419.5 mg Inject 419.5 mg into the vein once daily for 14 days  Diagnosis:  Enterococcus faecalis  infection in blood       sulfamethoxazole-trimethoprim 800-160mg (BACTRIM DS) 800-160 mg Tab 1 tablet by Per G Tube route 2 (two) times daily. for 14 days  Qty: 28 tablet, Refills: 0    Diagnosis:  Carbapenem resistant Acinetobacter infection       vancomycin 125 mg/5 mL Soln 5 mLs (125 mg total) by Per G Tube route 2 (two) times a day. for 14 days\  Diagnosis:  Clostridium difficile prophylaxis                 CONTINUE these medications which have NOT CHANGED     Details   acetaminophen (TYLENOL) 325 MG tablet 2 tablets (650 mg total) by Per G Tube route every 4 (four) hours as needed for Pain or Temperature greater than (100.4F).  Refills: 0       acetylcysteine 100 mg/ml, 10%, (MUCOMYST) 100 mg/mL (10 %) nebulizer solution Take 4 mLs by nebulization every 8 (eight) hours.  Refills: 12       albuterol-ipratropium (DUO-NEB) 2.5 mg-0.5 mg/3 mL nebulizer solution Take 3 mLs by nebulization every 6 (six) hours. Rescue  Qty: 1 Box, Refills: 0       artificial tears (ISOPTO TEARS) 0.5 % ophthalmic solution Place 1 drop into both eyes as needed.       ergocalciferol (ERGOCALCIFEROL) 50,000 unit Cap Take 50,000 Units by mouth every 7 days.       ferrous sulfate 300 mg (60 mg iron)/5 mL syrup Take 300 mg by mouth As instructed. Every other day       midodrine (PROAMATINE) 10 MG tablet 1 tablet (10 mg total) by Per G Tube route 3 (three) times daily.  Qty: 90 tablet, Refills: 11       polyethylene glycol (GLYCOLAX) 17 gram PwPk Take by mouth daily as needed.       scopolamine (TRANSDERM-SCOP) 1.3-1.5 mg (1 mg over 3 days) Place 1 patch onto the skin Every 3 (three) days.  Qty: 30 patch, Refills: 0                STOP taking these medications         clonazePAM (KLONOPIN) 0.5 MG tablet Comments:   Reason for Stopping:            meropenem-0.9% sodium chloride 500 mg/50 mL PgBk Comments:   Reason for Stopping:                  Indwelling Lines/Drains at time of discharge:   Lines/Drains/Airways     Peripherally Inserted  Central Catheter Line  Duration           PICC Triple Lumen 03/17/22 1532 right cephalic 9 days          Drain  Duration                Gastrostomy/Enterostomy LUQ -- days         Biliary Tube 01/13/22 72 days         Open Drain 01/13/22 1015 Right RLQ Other (see comments) 1/4 inch 72 days         Urethral Catheter 03/20/22 0000 Latex 16 Fr. 6 days         Rectal Tube 03/23/22 0400 rectal tube w/ balloon (indicate number of mLs) 3 days          Airway  Duration                Surgical Airway Portex Cuffed -- days                Time spent on the discharge of patient: 42 minutes      Lanre Goss MD  Department of Hospital Medicine  [facility]

## 2022-03-27 NOTE — HOSPITAL COURSE
Was admitted with ventilator-associated pneumonia, acute UTI, and severe sepsis.  Was put on IVAB.  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  We consulted GI due to possible PEG dysfunction, and a contrast study of the G-tube showed it to be in good position.  Tube feeds were resumed.  A blood culture ended up growing Enterococcus.  Infectious Disease service was consulted.  The source of the Enterococcus was determined to be the gall bladder.  When pt was admitted to our service, she had a cholecystostomy tube, which had been placed at [facility].  We consulted with general surgery regarding all this, and he recommended keeping the tube in place.  There was no indication to perform surgery.  Antibiotics continued.  Several cultures from different body sources all grew pathogens.  ID helped us with antibiotic treatment.  There were no further indications to keep pt hospitalized.  She was discharged back to [facility] for [facility] care.    Physical Exam  Constitutional:       General: She is not in acute distress.  Eyes:      General:         Right eye: No discharge.         Left eye: No discharge.   Neck:      Vascular: No JVD.   Cardiovascular:      Rate and Rhythm: Normal rate and regular rhythm.   Pulmonary:      Breath sounds: Normal breath sounds.      Comments: Tracheostomy connected to ventilator  Abdominal:      General: Abdomen is flat. Bowel sounds are normal. There is no distension.      Palpations: Abdomen is soft.      Comments: Cholecystostomy drain in RUQ.  Gastrostomy in LUQ.

## 2022-04-17 NOTE — PROCEDURES
DATE: 3/18/22    EEG NUMBER: ON     REFERRING PHYSICIAN:  Dr. Boo      This EEG was performed to assess for subclinical seizures      ELECTROENCEPHALOGRAM REPORT     METHODOLOGY:  Electroencephalographic (EEG) recording is with electrodes placed according to the International 10-20 placement system.  Thirty two (32) channels of digital signal are simultaneously recorded from the scalp and may include EKG, EMG, and/or eye monitors.   Recording band pass was 0.1 to 512 hz.  Digital video recording of the patient is simultaneously recorded with the EEG.  The nursing staff report clinical symptoms and may press an event button when the patient has symptoms of clinical interest to the treating physicians.  EEG and video recording is stored and archived in digital format.  The entire recording is visually reviewed, and the times identified by computer analysis as being spikes or seizures are reviewed again.  Activation procedures which include photic stimulation, hyperventilation and instructing patients to perform simple task are done in selected patients.   Compresses spectral analysis (CSA) is also performed on the activity recorded from each individual channel.  This is displayed as a power display of frequencies from 0 to 30 Hz over time.   The CSA analysis is done and displayed continuously.  This is reviewed for asymmetries in power between homologous areas of the scalp and for presence of changes in power which can be seen when seizures occur.  Sections of suspected abnormalities on the CSA is then compared with the original EEG recording.                [employer] software was also utilized in the review of this study.  This software suite analyzes the EEG recording in multiple domains.  Coherence and rhythmicity is computed to identify EEG sections which may contain organized seizures.  Each channel undergoes analysis to detect presence of spike and sharp waves which have special and morphological  characteristic of epileptic activity.  The routine EEG recording is converted from spacial into frequency domain.  This is then displayed comparing homologous areas to identify areas of significant asymmetry.  Algorithm to identify non-cortically generated artifact is used to separate eye movement, EMG and other artifact from the EEG.     EEG FINDINGS:  The recording was obtained with a number of standard bipolar and referential montages during obtunded state.  Diffuse disorganized low amplitude mixed delta and occasional theta range slowing was noted which was symmetric.  The background was intermixed with symmetric spindles.  Intermittent photic stimulation failed to alter the background.  Variability and reactivity were noted.  There were no interictal epileptiform abnormalities and no clinical or electrographic seizures were recorded.    The EKG channel revealed a sinus rhythm.     IMPRESSION:  This is an abnormal EEG during obtunded state.  Diffuse disorganized low-amplitude slowing of the background was noted     CLINICAL CORRELATION:  The patient is 61-year-old female with a history of anoxic injury.  The patient is currently not maintained on any antiseizure medications.  This is an abnormal EEG during obtunded state.  The overall degree of disorganization and suppression is suggestive of a severe encephalopathy.  There is no evidence of an epileptic process on this recording.  No seizures were recorded during this study.

## 2022-07-29 ENCOUNTER — HOSPITAL ENCOUNTER (INPATIENT)
Facility: HOSPITAL | Age: 62
LOS: 7 days | DRG: 870 | End: 2022-08-05
Attending: EMERGENCY MEDICINE | Admitting: INTERNAL MEDICINE
Payer: MEDICARE

## 2022-07-29 ENCOUNTER — ANESTHESIA (OUTPATIENT)
Dept: INTENSIVE CARE | Facility: HOSPITAL | Age: 62
End: 2022-07-29

## 2022-07-29 ENCOUNTER — ANESTHESIA (OUTPATIENT)
Dept: INTENSIVE CARE | Facility: HOSPITAL | Age: 62
DRG: 870 | End: 2022-07-29
Payer: MEDICARE

## 2022-07-29 ENCOUNTER — ANESTHESIA EVENT (OUTPATIENT)
Dept: INTENSIVE CARE | Facility: HOSPITAL | Age: 62
End: 2022-07-29

## 2022-07-29 ENCOUNTER — ANESTHESIA EVENT (OUTPATIENT)
Dept: INTENSIVE CARE | Facility: HOSPITAL | Age: 62
DRG: 870 | End: 2022-07-29
Payer: MEDICARE

## 2022-07-29 DIAGNOSIS — A41.9 SEPSIS: ICD-10-CM

## 2022-07-29 DIAGNOSIS — J69.0 ASPIRATION PNEUMONIA OF RIGHT LUNG, UNSPECIFIED ASPIRATION PNEUMONIA TYPE, UNSPECIFIED PART OF LUNG: Primary | ICD-10-CM

## 2022-07-29 DIAGNOSIS — N39.0 URINARY TRACT INFECTION WITHOUT HEMATURIA, SITE UNSPECIFIED: ICD-10-CM

## 2022-07-29 DIAGNOSIS — J18.9 PNEUMONIA DUE TO INFECTIOUS ORGANISM, UNSPECIFIED LATERALITY, UNSPECIFIED PART OF LUNG: ICD-10-CM

## 2022-07-29 DIAGNOSIS — J96.11 CHRONIC RESPIRATORY FAILURE WITH HYPOXIA: ICD-10-CM

## 2022-07-29 DIAGNOSIS — J69.0 ASPIRATION PNEUMONIA, UNSPECIFIED ASPIRATION PNEUMONIA TYPE, UNSPECIFIED LATERALITY, UNSPECIFIED PART OF LUNG: ICD-10-CM

## 2022-07-29 PROBLEM — Z99.11 VENTILATOR DEPENDENCE: Status: ACTIVE | Noted: 2022-07-29

## 2022-07-29 PROBLEM — R57.9 SHOCK, UNSPECIFIED: Status: ACTIVE | Noted: 2022-07-29

## 2022-07-29 LAB
ALBUMIN SERPL BCP-MCNC: 3.5 G/DL (ref 3.5–5.2)
ALLENS TEST: ABNORMAL
ALP SERPL-CCNC: 198 U/L (ref 55–135)
ALT SERPL W/O P-5'-P-CCNC: 69 U/L (ref 10–44)
ANION GAP SERPL CALC-SCNC: 9 MMOL/L (ref 8–16)
AST SERPL-CCNC: 39 U/L (ref 10–40)
BACTERIA #/AREA URNS HPF: ABNORMAL /HPF
BASOPHILS # BLD AUTO: 0.01 K/UL (ref 0–0.2)
BASOPHILS NFR BLD: 0.2 % (ref 0–1.9)
BILIRUB SERPL-MCNC: 0.9 MG/DL (ref 0.1–1)
BILIRUB UR QL STRIP: NEGATIVE
BNP SERPL-MCNC: 29 PG/ML (ref 0–99)
BUN SERPL-MCNC: 31 MG/DL (ref 6–20)
CALCIUM SERPL-MCNC: 8.8 MG/DL (ref 8.7–10.5)
CHLORIDE SERPL-SCNC: 96 MMOL/L (ref 95–110)
CLARITY UR: ABNORMAL
CO2 SERPL-SCNC: 23 MMOL/L (ref 23–29)
COLOR UR: YELLOW
CREAT SERPL-MCNC: <0.3 MG/DL (ref 0.5–1.4)
DELSYS: ABNORMAL
DIFFERENTIAL METHOD: ABNORMAL
EOSINOPHIL # BLD AUTO: 0.2 K/UL (ref 0–0.5)
EOSINOPHIL NFR BLD: 3.5 % (ref 0–8)
ERYTHROCYTE [DISTWIDTH] IN BLOOD BY AUTOMATED COUNT: 18.3 % (ref 11.5–14.5)
ERYTHROCYTE [SEDIMENTATION RATE] IN BLOOD BY WESTERGREN METHOD: 16 MM/H
EST. GFR  (AFRICAN AMERICAN): >60 ML/MIN/1.73 M^2
EST. GFR  (NON AFRICAN AMERICAN): >60 ML/MIN/1.73 M^2
FIO2: 50
GLUCOSE SERPL-MCNC: 100 MG/DL (ref 70–110)
GLUCOSE SERPL-MCNC: 97 MG/DL (ref 70–110)
GLUCOSE UR QL STRIP: NEGATIVE
HCO3 UR-SCNC: 27.7 MMOL/L (ref 24–28)
HCT VFR BLD AUTO: 36.2 % (ref 37–48.5)
HCT VFR BLD CALC: 34 %PCV (ref 36–54)
HGB BLD-MCNC: 11 G/DL (ref 12–16)
HGB UR QL STRIP: ABNORMAL
HYALINE CASTS #/AREA URNS LPF: 25 /LPF
IMM GRANULOCYTES # BLD AUTO: 0.01 K/UL (ref 0–0.04)
IMM GRANULOCYTES NFR BLD AUTO: 0.2 % (ref 0–0.5)
INFLUENZA A, MOLECULAR: NEGATIVE
INFLUENZA B, MOLECULAR: NEGATIVE
KETONES UR QL STRIP: NEGATIVE
LACTATE SERPL-SCNC: 1.8 MMOL/L (ref 0.5–1.9)
LEUKOCYTE ESTERASE UR QL STRIP: ABNORMAL
LIPASE SERPL-CCNC: 20 U/L (ref 4–60)
LYMPHOCYTES # BLD AUTO: 0.9 K/UL (ref 1–4.8)
LYMPHOCYTES NFR BLD: 20.8 % (ref 18–48)
MAGNESIUM SERPL-MCNC: 1.9 MG/DL (ref 1.6–2.6)
MCH RBC QN AUTO: 26.3 PG (ref 27–31)
MCHC RBC AUTO-ENTMCNC: 30.4 G/DL (ref 32–36)
MCV RBC AUTO: 86 FL (ref 82–98)
MICROSCOPIC COMMENT: ABNORMAL
MIN VOL: 11.2
MODE: ABNORMAL
MONOCYTES # BLD AUTO: 0.3 K/UL (ref 0.3–1)
MONOCYTES NFR BLD: 6.7 % (ref 4–15)
NEUTROPHILS # BLD AUTO: 3 K/UL (ref 1.8–7.7)
NEUTROPHILS NFR BLD: 68.6 % (ref 38–73)
NITRITE UR QL STRIP: NEGATIVE
NRBC BLD-RTO: 0 /100 WBC
PCO2 BLDA: 49.1 MMHG (ref 35–45)
PEEP: 5
PH SMN: 7.36 [PH] (ref 7.35–7.45)
PH UR STRIP: 7 [PH] (ref 5–8)
PIP: 33
PLATELET # BLD AUTO: 185 K/UL (ref 150–450)
PMV BLD AUTO: 11.7 FL (ref 9.2–12.9)
PO2 BLDA: 45 MMHG (ref 40–60)
POC BE: 2 MMOL/L
POC IONIZED CALCIUM: 1.28 MMOL/L (ref 1.06–1.42)
POC SATURATED O2: 78 % (ref 95–100)
POC TCO2: 29 MMOL/L (ref 24–29)
POTASSIUM BLD-SCNC: 5.1 MMOL/L (ref 3.5–5.1)
POTASSIUM SERPL-SCNC: 5.1 MMOL/L (ref 3.5–5.1)
PROCALCITONIN SERPL IA-MCNC: <0.05 NG/ML (ref 0–0.5)
PROT SERPL-MCNC: 9.3 G/DL (ref 6–8.4)
PROT UR QL STRIP: ABNORMAL
RBC # BLD AUTO: 4.19 M/UL (ref 4–5.4)
RBC #/AREA URNS HPF: >100 /HPF (ref 0–4)
SAMPLE: ABNORMAL
SARS-COV-2 RDRP RESP QL NAA+PROBE: NEGATIVE
SITE: ABNORMAL
SODIUM BLD-SCNC: 136 MMOL/L (ref 136–145)
SODIUM SERPL-SCNC: 128 MMOL/L (ref 136–145)
SP GR UR STRIP: 1.02 (ref 1–1.03)
SP02: 100
SPECIMEN SOURCE: NORMAL
SQUAMOUS #/AREA URNS HPF: 3 /HPF
TROPONIN I SERPL DL<=0.01 NG/ML-MCNC: <0.03 NG/ML
URN SPEC COLLECT METH UR: ABNORMAL
UROBILINOGEN UR STRIP-ACNC: NEGATIVE EU/DL
VT: 450
WBC # BLD AUTO: 4.33 K/UL (ref 3.9–12.7)
WBC #/AREA URNS HPF: >100 /HPF (ref 0–5)

## 2022-07-29 PROCEDURE — 63600175 PHARM REV CODE 636 W HCPCS: Performed by: INTERNAL MEDICINE

## 2022-07-29 PROCEDURE — 87040 BLOOD CULTURE FOR BACTERIA: CPT | Performed by: STUDENT IN AN ORGANIZED HEALTH CARE EDUCATION/TRAINING PROGRAM

## 2022-07-29 PROCEDURE — 81003 URINALYSIS AUTO W/O SCOPE: CPT | Performed by: STUDENT IN AN ORGANIZED HEALTH CARE EDUCATION/TRAINING PROGRAM

## 2022-07-29 PROCEDURE — 99285 EMERGENCY DEPT VISIT HI MDM: CPT | Mod: 25

## 2022-07-29 PROCEDURE — 99900035 HC TECH TIME PER 15 MIN (STAT)

## 2022-07-29 PROCEDURE — 94761 N-INVAS EAR/PLS OXIMETRY MLT: CPT

## 2022-07-29 PROCEDURE — 20000000 HC ICU ROOM

## 2022-07-29 PROCEDURE — 83735 ASSAY OF MAGNESIUM: CPT | Performed by: STUDENT IN AN ORGANIZED HEALTH CARE EDUCATION/TRAINING PROGRAM

## 2022-07-29 PROCEDURE — 80053 COMPREHEN METABOLIC PANEL: CPT | Performed by: STUDENT IN AN ORGANIZED HEALTH CARE EDUCATION/TRAINING PROGRAM

## 2022-07-29 PROCEDURE — 99900026 HC AIRWAY MAINTENANCE (STAT)

## 2022-07-29 PROCEDURE — U0002 COVID-19 LAB TEST NON-CDC: HCPCS | Performed by: STUDENT IN AN ORGANIZED HEALTH CARE EDUCATION/TRAINING PROGRAM

## 2022-07-29 PROCEDURE — 94799 UNLISTED PULMONARY SVC/PX: CPT

## 2022-07-29 PROCEDURE — 93005 ELECTROCARDIOGRAM TRACING: CPT | Performed by: INTERNAL MEDICINE

## 2022-07-29 PROCEDURE — 82803 BLOOD GASES ANY COMBINATION: CPT

## 2022-07-29 PROCEDURE — 93010 EKG 12-LEAD: ICD-10-PCS | Mod: ,,, | Performed by: INTERNAL MEDICINE

## 2022-07-29 PROCEDURE — 87205 SMEAR GRAM STAIN: CPT | Performed by: EMERGENCY MEDICINE

## 2022-07-29 PROCEDURE — 94002 VENT MGMT INPAT INIT DAY: CPT

## 2022-07-29 PROCEDURE — 83880 ASSAY OF NATRIURETIC PEPTIDE: CPT | Performed by: EMERGENCY MEDICINE

## 2022-07-29 PROCEDURE — 99900031 HC PATIENT EDUCATION (STAT)

## 2022-07-29 PROCEDURE — 84484 ASSAY OF TROPONIN QUANT: CPT | Performed by: STUDENT IN AN ORGANIZED HEALTH CARE EDUCATION/TRAINING PROGRAM

## 2022-07-29 PROCEDURE — 25000003 PHARM REV CODE 250: Performed by: INTERNAL MEDICINE

## 2022-07-29 PROCEDURE — 63600175 PHARM REV CODE 636 W HCPCS: Performed by: EMERGENCY MEDICINE

## 2022-07-29 PROCEDURE — 84295 ASSAY OF SERUM SODIUM: CPT

## 2022-07-29 PROCEDURE — 83690 ASSAY OF LIPASE: CPT | Performed by: STUDENT IN AN ORGANIZED HEALTH CARE EDUCATION/TRAINING PROGRAM

## 2022-07-29 PROCEDURE — 96366 THER/PROPH/DIAG IV INF ADDON: CPT

## 2022-07-29 PROCEDURE — 87502 INFLUENZA DNA AMP PROBE: CPT | Performed by: EMERGENCY MEDICINE

## 2022-07-29 PROCEDURE — 99291 CRITICAL CARE FIRST HOUR: CPT | Mod: ,,, | Performed by: INTERNAL MEDICINE

## 2022-07-29 PROCEDURE — 93010 ELECTROCARDIOGRAM REPORT: CPT | Mod: ,,, | Performed by: INTERNAL MEDICINE

## 2022-07-29 PROCEDURE — 87186 SC STD MICRODIL/AGAR DIL: CPT | Performed by: EMERGENCY MEDICINE

## 2022-07-29 PROCEDURE — 94640 AIRWAY INHALATION TREATMENT: CPT

## 2022-07-29 PROCEDURE — 83605 ASSAY OF LACTIC ACID: CPT | Performed by: STUDENT IN AN ORGANIZED HEALTH CARE EDUCATION/TRAINING PROGRAM

## 2022-07-29 PROCEDURE — 25000003 PHARM REV CODE 250: Performed by: EMERGENCY MEDICINE

## 2022-07-29 PROCEDURE — 25000242 PHARM REV CODE 250 ALT 637 W/ HCPCS

## 2022-07-29 PROCEDURE — 84145 PROCALCITONIN (PCT): CPT | Performed by: EMERGENCY MEDICINE

## 2022-07-29 PROCEDURE — 25000242 PHARM REV CODE 250 ALT 637 W/ HCPCS: Performed by: INTERNAL MEDICINE

## 2022-07-29 PROCEDURE — 96365 THER/PROPH/DIAG IV INF INIT: CPT

## 2022-07-29 PROCEDURE — 87077 CULTURE AEROBIC IDENTIFY: CPT | Mod: 59 | Performed by: EMERGENCY MEDICINE

## 2022-07-29 PROCEDURE — 85025 COMPLETE CBC W/AUTO DIFF WBC: CPT | Performed by: STUDENT IN AN ORGANIZED HEALTH CARE EDUCATION/TRAINING PROGRAM

## 2022-07-29 PROCEDURE — 82330 ASSAY OF CALCIUM: CPT

## 2022-07-29 PROCEDURE — 87086 URINE CULTURE/COLONY COUNT: CPT | Performed by: STUDENT IN AN ORGANIZED HEALTH CARE EDUCATION/TRAINING PROGRAM

## 2022-07-29 PROCEDURE — 27000221 HC OXYGEN, UP TO 24 HOURS

## 2022-07-29 PROCEDURE — 96367 TX/PROPH/DG ADDL SEQ IV INF: CPT

## 2022-07-29 PROCEDURE — 84132 ASSAY OF SERUM POTASSIUM: CPT

## 2022-07-29 PROCEDURE — 81001 URINALYSIS AUTO W/SCOPE: CPT | Performed by: STUDENT IN AN ORGANIZED HEALTH CARE EDUCATION/TRAINING PROGRAM

## 2022-07-29 PROCEDURE — 87070 CULTURE OTHR SPECIMN AEROBIC: CPT | Performed by: EMERGENCY MEDICINE

## 2022-07-29 PROCEDURE — 99291 PR CRITICAL CARE, E/M 30-74 MINUTES: ICD-10-PCS | Mod: ,,, | Performed by: INTERNAL MEDICINE

## 2022-07-29 PROCEDURE — 63600175 PHARM REV CODE 636 W HCPCS: Performed by: STUDENT IN AN ORGANIZED HEALTH CARE EDUCATION/TRAINING PROGRAM

## 2022-07-29 PROCEDURE — 85014 HEMATOCRIT: CPT

## 2022-07-29 RX ORDER — MEROPENEM AND SODIUM CHLORIDE 500 MG/50ML
500 INJECTION, SOLUTION INTRAVENOUS
Status: DISCONTINUED | OUTPATIENT
Start: 2022-07-29 | End: 2022-07-29

## 2022-07-29 RX ORDER — IPRATROPIUM BROMIDE AND ALBUTEROL SULFATE 2.5; .5 MG/3ML; MG/3ML
SOLUTION RESPIRATORY (INHALATION)
Status: COMPLETED
Start: 2022-07-29 | End: 2022-07-29

## 2022-07-29 RX ORDER — MEROPENEM 1 G/1
INJECTION, POWDER, FOR SOLUTION INTRAVENOUS
Status: ON HOLD | COMMUNITY
Start: 2022-04-07 | End: 2022-08-03 | Stop reason: HOSPADM

## 2022-07-29 RX ORDER — CHLORHEXIDINE GLUCONATE ORAL RINSE 1.2 MG/ML
15 SOLUTION DENTAL 2 TIMES DAILY
COMMUNITY
Start: 2022-06-22 | End: 2023-07-06

## 2022-07-29 RX ORDER — DAPTOMYCIN 50 MG/ML
INJECTION, POWDER, LYOPHILIZED, FOR SOLUTION INTRAVENOUS
Status: ON HOLD | COMMUNITY
Start: 2022-04-07 | End: 2022-08-03 | Stop reason: HOSPADM

## 2022-07-29 RX ORDER — SODIUM CHLORIDE 900 MG/100ML
INJECTION INTRAVENOUS
COMMUNITY
Start: 2022-04-07 | End: 2023-07-06

## 2022-07-29 RX ORDER — SODIUM CHLORIDE 9 MG/ML
INJECTION, SOLUTION INTRAVENOUS CONTINUOUS
Status: DISCONTINUED | OUTPATIENT
Start: 2022-07-29 | End: 2022-08-04

## 2022-07-29 RX ORDER — MEROPENEM AND SODIUM CHLORIDE 1 G/50ML
1 INJECTION, SOLUTION INTRAVENOUS
Status: DISCONTINUED | OUTPATIENT
Start: 2022-07-29 | End: 2022-07-31

## 2022-07-29 RX ORDER — SODIUM CHLORIDE 9 MG/ML
INJECTION, SOLUTION INTRAVENOUS CONTINUOUS
Status: DISCONTINUED | OUTPATIENT
Start: 2022-07-29 | End: 2022-07-29

## 2022-07-29 RX ORDER — ACETYLCYSTEINE 200 MG/ML
4 SOLUTION ORAL; RESPIRATORY (INHALATION) EVERY 8 HOURS
Status: DISCONTINUED | OUTPATIENT
Start: 2022-07-29 | End: 2022-07-30

## 2022-07-29 RX ORDER — WHEY PROTEIN ISOLATE 6 G-25/7 G
7 POWDER (GRAM) ORAL 2 TIMES DAILY
Status: ON HOLD | COMMUNITY
End: 2022-08-26 | Stop reason: HOSPADM

## 2022-07-29 RX ORDER — ENOXAPARIN SODIUM 100 MG/ML
40 INJECTION SUBCUTANEOUS
Status: DISCONTINUED | OUTPATIENT
Start: 2022-07-29 | End: 2022-08-05 | Stop reason: HOSPADM

## 2022-07-29 RX ORDER — CLONAZEPAM 0.5 MG/1
0.5 TABLET ORAL 2 TIMES DAILY
COMMUNITY
End: 2022-08-26

## 2022-07-29 RX ORDER — ACETAMINOPHEN 325 MG/1
650 TABLET ORAL EVERY 4 HOURS PRN
Status: DISCONTINUED | OUTPATIENT
Start: 2022-07-29 | End: 2022-08-05 | Stop reason: HOSPADM

## 2022-07-29 RX ORDER — OXYBUTYNIN CHLORIDE 5 MG/1
5 TABLET ORAL 2 TIMES DAILY
COMMUNITY
Start: 2022-07-08 | End: 2022-08-26

## 2022-07-29 RX ORDER — IPRATROPIUM BROMIDE AND ALBUTEROL SULFATE 2.5; .5 MG/3ML; MG/3ML
3 SOLUTION RESPIRATORY (INHALATION) EVERY 6 HOURS
Status: DISCONTINUED | OUTPATIENT
Start: 2022-07-29 | End: 2022-07-30

## 2022-07-29 RX ORDER — NOREPINEPHRINE BITARTRATE/D5W 4MG/250ML
0-3 PLASTIC BAG, INJECTION (ML) INTRAVENOUS CONTINUOUS
Status: DISCONTINUED | OUTPATIENT
Start: 2022-07-29 | End: 2022-08-04

## 2022-07-29 RX ORDER — IPRATROPIUM BROMIDE AND ALBUTEROL SULFATE 2.5; .5 MG/3ML; MG/3ML
SOLUTION RESPIRATORY (INHALATION)
Status: COMPLETED
Start: 2022-07-29 | End: 2022-08-01

## 2022-07-29 RX ORDER — FAMOTIDINE 20 MG/1
20 TABLET, FILM COATED ORAL 2 TIMES DAILY
Status: DISCONTINUED | OUTPATIENT
Start: 2022-07-29 | End: 2022-08-05 | Stop reason: HOSPADM

## 2022-07-29 RX ORDER — SODIUM CHLORIDE, SODIUM LACTATE, POTASSIUM CHLORIDE, CALCIUM CHLORIDE 600; 310; 30; 20 MG/100ML; MG/100ML; MG/100ML; MG/100ML
INJECTION, SOLUTION INTRAVENOUS CONTINUOUS
Status: DISCONTINUED | OUTPATIENT
Start: 2022-07-29 | End: 2022-07-29

## 2022-07-29 RX ORDER — CARBOXYMETHYLCELLULOSE SODIUM 10 MG/ML
1 GEL OPHTHALMIC
Status: DISCONTINUED | OUTPATIENT
Start: 2022-07-29 | End: 2022-08-05 | Stop reason: HOSPADM

## 2022-07-29 RX ORDER — MIDODRINE HYDROCHLORIDE 2.5 MG/1
10 TABLET ORAL 3 TIMES DAILY
Status: DISCONTINUED | OUTPATIENT
Start: 2022-07-29 | End: 2022-08-05 | Stop reason: HOSPADM

## 2022-07-29 RX ORDER — VANCOMYCIN HCL IN 5 % DEXTROSE 1G/250ML
1000 PLASTIC BAG, INJECTION (ML) INTRAVENOUS ONCE
Status: COMPLETED | OUTPATIENT
Start: 2022-07-29 | End: 2022-07-29

## 2022-07-29 RX ORDER — OXYBUTYNIN CHLORIDE 5 MG/1
5 TABLET ORAL 2 TIMES DAILY
Status: DISCONTINUED | OUTPATIENT
Start: 2022-07-29 | End: 2022-08-05 | Stop reason: HOSPADM

## 2022-07-29 RX ADMIN — VANCOMYCIN HYDROCHLORIDE 1000 MG: 1 INJECTION, POWDER, LYOPHILIZED, FOR SOLUTION INTRAVENOUS at 04:07

## 2022-07-29 RX ADMIN — SODIUM CHLORIDE: 0.9 INJECTION, SOLUTION INTRAVENOUS at 08:07

## 2022-07-29 RX ADMIN — OXYBUTYNIN CHLORIDE 5 MG: 5 TABLET ORAL at 09:07

## 2022-07-29 RX ADMIN — VANCOMYCIN HYDROCHLORIDE 500 MG: 500 INJECTION, POWDER, LYOPHILIZED, FOR SOLUTION INTRAVENOUS at 06:07

## 2022-07-29 RX ADMIN — IPRATROPIUM BROMIDE AND ALBUTEROL SULFATE 3 ML: .5; 3 SOLUTION RESPIRATORY (INHALATION) at 02:07

## 2022-07-29 RX ADMIN — MEROPENEM 2 G: 1 INJECTION, POWDER, FOR SOLUTION INTRAVENOUS at 07:07

## 2022-07-29 RX ADMIN — MIDODRINE HYDROCHLORIDE 10 MG: 2.5 TABLET ORAL at 09:07

## 2022-07-29 RX ADMIN — MEROPENEM AND SODIUM CHLORIDE 1 G: 1 INJECTION, SOLUTION INTRAVENOUS at 10:07

## 2022-07-29 RX ADMIN — IPRATROPIUM BROMIDE AND ALBUTEROL SULFATE 3 ML: .5; 3 SOLUTION RESPIRATORY (INHALATION) at 07:07

## 2022-07-29 RX ADMIN — NOREPINEPHRINE BITARTRATE 0.03 MCG/KG/MIN: 4 INJECTION, SOLUTION INTRAVENOUS at 01:07

## 2022-07-29 RX ADMIN — SODIUM CHLORIDE: 0.9 INJECTION, SOLUTION INTRAVENOUS at 09:07

## 2022-07-29 RX ADMIN — ACETYLCYSTEINE 4 ML: 200 INHALANT RESPIRATORY (INHALATION) at 03:07

## 2022-07-29 RX ADMIN — VANCOMYCIN HYDROCHLORIDE 1250 MG: 1.25 INJECTION, POWDER, LYOPHILIZED, FOR SOLUTION INTRAVENOUS at 04:07

## 2022-07-29 RX ADMIN — FAMOTIDINE 20 MG: 20 TABLET ORAL at 09:07

## 2022-07-29 RX ADMIN — SODIUM CHLORIDE: 0.9 INJECTION, SOLUTION INTRAVENOUS at 12:07

## 2022-07-29 RX ADMIN — MEROPENEM AND SODIUM CHLORIDE 1 G: 1 INJECTION, SOLUTION INTRAVENOUS at 03:07

## 2022-07-29 RX ADMIN — ENOXAPARIN SODIUM 40 MG: 40 INJECTION SUBCUTANEOUS at 11:07

## 2022-07-29 RX ADMIN — IPRATROPIUM BROMIDE AND ALBUTEROL SULFATE 3 ML: .5; 3 SOLUTION RESPIRATORY (INHALATION) at 03:07

## 2022-07-29 RX ADMIN — SODIUM CHLORIDE, SODIUM LACTATE, POTASSIUM CHLORIDE, AND CALCIUM CHLORIDE: .6; .31; .03; .02 INJECTION, SOLUTION INTRAVENOUS at 04:07

## 2022-07-29 RX ADMIN — MIDODRINE HYDROCHLORIDE 10 MG: 2.5 TABLET ORAL at 03:07

## 2022-07-29 NOTE — PLAN OF CARE
Pt required pressure support during shift. MD made aware. Bolus given, central line placed, norepi started per MD order. VSS at this time. Will continue to monitor.     Problem: Infection  Goal: Absence of Infection Signs and Symptoms  Outcome: Ongoing, Progressing     Problem: Adult Inpatient Plan of Care  Goal: Plan of Care Review  Outcome: Ongoing, Progressing  Goal: Patient-Specific Goal (Individualized)  Outcome: Ongoing, Progressing  Goal: Absence of Hospital-Acquired Illness or Injury  Outcome: Ongoing, Progressing  Goal: Optimal Comfort and Wellbeing  Outcome: Ongoing, Progressing  Goal: Readiness for Transition of Care  Outcome: Ongoing, Progressing     Problem: Impaired Wound Healing  Goal: Optimal Wound Healing  Outcome: Ongoing, Progressing     Problem: Communication Impairment (Mechanical Ventilation, Invasive)  Goal: Effective Communication  Outcome: Ongoing, Progressing     Problem: Device-Related Complication Risk (Mechanical Ventilation, Invasive)  Goal: Optimal Device Function  Outcome: Ongoing, Progressing     Problem: Inability to Wean (Mechanical Ventilation, Invasive)  Goal: Mechanical Ventilation Liberation  Outcome: Ongoing, Progressing     Problem: Nutrition Impairment (Mechanical Ventilation, Invasive)  Goal: Optimal Nutrition Delivery  Outcome: Ongoing, Progressing     Problem: Skin and Tissue Injury (Mechanical Ventilation, Invasive)  Goal: Absence of Device-Related Skin and Tissue Injury  Outcome: Ongoing, Progressing     Problem: Ventilator-Induced Lung Injury (Mechanical Ventilation, Invasive)  Goal: Absence of Ventilator-Induced Lung Injury  Outcome: Ongoing, Progressing     Problem: Communication Impairment (Artificial Airway)  Goal: Effective Communication  Outcome: Ongoing, Progressing     Problem: Device-Related Complication Risk (Artificial Airway)  Goal: Optimal Device Function  Outcome: Ongoing, Progressing     Problem: Skin and Tissue Injury (Artificial Airway)  Goal: Absence  of Device-Related Skin or Tissue Injury  Outcome: Ongoing, Progressing     Problem: Noninvasive Ventilation Acute  Goal: Effective Unassisted Ventilation and Oxygenation  Outcome: Ongoing, Progressing     Problem: Skin Injury Risk Increased  Goal: Skin Health and Integrity  Outcome: Ongoing, Progressing     Problem: Aspiration (Enteral Nutrition)  Goal: Absence of Aspiration Signs and Symptoms  Outcome: Ongoing, Progressing     Problem: Device-Related Complication Risk (Enteral Nutrition)  Goal: Safe, Effective Therapy Delivery  Outcome: Ongoing, Progressing     Problem: Feeding Intolerance (Enteral Nutrition)  Goal: Feeding Tolerance  Outcome: Ongoing, Progressing

## 2022-07-29 NOTE — ED PROVIDER NOTES
Encounter Date: 7/29/2022       History     Chief Complaint   Patient presents with    Possible Aspiration     Blood and tube feeding noted by [facility] staff after suctioning trach     HPI   61-year-old female with history which includes GERD, hypertension, pulmonary embolism who is vent dependent with a baseline GCS of 3 status post PEG presenting from care facility with concern for aspiration after the patient was noted to be coughing.   Review of patient's allergies indicates:  No Known Allergies  Past Medical History:   Diagnosis Date    Anoxic brain damage 07/2020    Bedbound 07/2020    Cardiac arrest 07/2020    Cirrhosis     GERD (gastroesophageal reflux disease)     Hemangioma of intra-abdominal structure     Hypertension     Nursing home resident     Protein calorie malnutrition     Pulmonary embolus     Respiratory distress     S/P percutaneous endoscopic gastrostomy (PEG) tube placement 07/2020    Total self-care deficit 07/2020    Tracheostomy dependence     7/2020     Past Surgical History:   Procedure Laterality Date    PEG W/TRACHEOSTOMY PLACEMENT  07/27/2020    SKIN GRAFT      buttocks     Family History   Problem Relation Age of Onset    No Known Problems Mother     No Known Problems Father      Social History     Tobacco Use    Smoking status: Never Smoker    Smokeless tobacco: Never Used   Substance Use Topics    Alcohol use: Not Currently    Drug use: Not Currently     Review of Systems   Unable to perform ROS: Patient nonverbal       Physical Exam     Initial Vitals   BP Pulse Resp Temp SpO2   07/29/22 0237 07/29/22 0237 07/29/22 0237 07/29/22 0312 07/29/22 0237   (!) 127/93 63 (!) 23 97.6 °F (36.4 °C) 100 %      MAP       --                Physical Exam    Constitutional: She appears well-developed.   HENT:   Head: Normocephalic.   Eyes: Pupils are equal, round, and reactive to light.   Pupils equal round reactive   Neck: Neck supple.   Cardiovascular:   Bradycardic    Pulmonary/Chest:   Wheezing noted bilaterally.   Abdominal:   Abdomen distended   Musculoskeletal:      Cervical back: Neck supple.           ED Course   Procedures  Labs Reviewed   CBC W/ AUTO DIFFERENTIAL - Abnormal; Notable for the following components:       Result Value    Hemoglobin 11.0 (*)     Hematocrit 36.2 (*)     MCH 26.3 (*)     MCHC 30.4 (*)     RDW 18.3 (*)     Lymph # 0.9 (*)     All other components within normal limits   COMPREHENSIVE METABOLIC PANEL - Abnormal; Notable for the following components:    Sodium 128 (*)     BUN 31 (*)     Creatinine <0.3 (*)     Total Protein 9.3 (*)     Alkaline Phosphatase 198 (*)     ALT 69 (*)     All other components within normal limits   URINALYSIS, REFLEX TO URINE CULTURE - Abnormal; Notable for the following components:    Appearance, UA Cloudy (*)     Protein, UA 3+ (*)     Occult Blood UA 3+ (*)     Leukocytes, UA 3+ (*)     All other components within normal limits    Narrative:     Specimen Source->Urine   URINALYSIS MICROSCOPIC - Abnormal; Notable for the following components:    RBC, UA >100 (*)     WBC, UA >100 (*)     Hyaline Casts, UA 25 (*)     All other components within normal limits    Narrative:     Specimen Source->Urine   ISTAT PROCEDURE - Abnormal; Notable for the following components:    POC PCO2 49.1 (*)     POC SATURATED O2 78 (*)     POC Hematocrit 34 (*)     All other components within normal limits   CULTURE, URINE   LIPASE   LACTIC ACID, PLASMA   MAGNESIUM   TROPONIN I   SARS-COV-2 RNA AMPLIFICATION, QUAL   PROCALCITONIN   B-TYPE NATRIURETIC PEPTIDE   INFLUENZA A AND B ANTIGEN    Narrative:     Specimen Source->Nasopharyngeal Swab        ECG Results          EKG 12-lead (In process)  Result time 07/29/22 06:22:46    In process by Interface, Lab In [facility] (07/29/22 06:22:46)                 Narrative:    Test Reason : A41.9,    Vent. Rate : 065 BPM     Atrial Rate : 065 BPM     P-R Int : 188 ms          QRS Dur : 106 ms      QT Int :  514 ms       P-R-T Axes : 077 059 039 degrees     QTc Int : 534 ms    Normal sinus rhythm  Prolonged QT  Abnormal ECG  When compared with ECG of 17-MAR-2022 09:25,  No significant change was found    Referred By: [email or web address]   SELF           Confirmed By:                             Imaging Results          CT Head Without Contrast (Final result)  Result time 07/29/22 06:41:35    Final result by Jayant Rosen MD (07/29/22 06:41:35)                 Impression:      Stable CT head demonstrating profound cerebral atrophy, diffuse ventricular enlargement, as well as diffuse cerebral hypoattenuation and loss of gray-white differentiation.      Electronically signed by: Jayant Rosen MD  Date:    07/29/2022  Time:    06:41             Narrative:    EXAMINATION:  CT HEAD WITHOUT CONTRAST    CLINICAL HISTORY:  sepsis with concern for aspiration;    TECHNIQUE:  CMS Mandated Quality Data-CT Radiation Dose-436    All CT scans at this facility dose modulation, iterative reconstruction, and or weight-based dosing when appropriate to reduce radiation dose to as low as reasonably achievable.    COMPARISON:  CT head 01/20/2022    FINDINGS:  Profound cerebral atrophy with disproportionate enlargement of lateral, 3rd, and 4th ventricles, stable compared to prior.  Diffuse decreased attenuation and loss of gray-white differentiation involving both cerebral hemispheres, similar to prior.  Negative for acute intracranial hemorrhage.  Cerebellar and brainstem atrophy.  Atherosclerotic calcification of intracranial carotid arteries.    No acute calvarial abnormality.  Mastoid air cells are clear.  Mild sphenoid sinus mucosal thickening.                               CT Chest Abdomen Pelvis Without Contrast (XPD) (Final result)  Result time 07/29/22 06:15:54    Final result by Ralph Craig MD (07/29/22 06:15:54)                 Narrative:    EXAM DESCRIPTION:  CT CHEST ABDOMEN PELVIS WITHOUT CONTRAST(XPD)    CLINICAL HISTORY:  61  years  Female  Sepsis    TECHNIQUE:  Trauma CT chest, abdomen, pelvis protocol without intravenous [contrast. Coronal and sagittal reformats were performed. All CT scans at this facility use dose modulation, iterative reconstruction, and/or weight based dosing when appropriate to reduce radiation dose to as low as reasonably achievable.    COMPARISON: CT abdomen and pelvis 5/21/2022.    FINDINGS:    Chest:  Lungs: Persistent right basilar consolidations. Bilateral upper and lower lobe tree-in-bud nodularities. Spiculated nodule in the superior segment of the right upper lobe posteriorly measuring 1.2 x 1.1 cm. Airway is patent and tracheostomy tube in place.  Pleura: No pneumothorax. No pleural effusion.  Mediastinum: No mediastinal mass or adenopathy. Heart is normal in size. No pericardial effusion. Atherosclerotic calcification of the coronary arteries.  Vascular: Grossly unremarkable.  Bones: Degenerative changes of the spine and bilateral shoulder joints.  Soft tissues: Unremarkable.    Abdomen/Pelvis:  Liver: Unremarkable.  Gallbladder: Cholecystostomy pigtail catheter with multiple gallstones, unchanged.  Pancreas: Within normal limits.  Spleen: Within normal limits.  Kidney: No stone or hydronephrosis.  Adrenal glands: Within normal limits.  Vascular structures: Unremarkable.    Bowel: Gastrostomy tube in place. No bowel distention.  Appendix: Normal.  Peritoneum/retroperitoneum: No free fluid or free air.    Lymph Nodes: No lymphadenopathy.  Reproductive: Unremarkable.  Urinary bladder: Decompressed with diffuse urinary bladder wall thickening and a Hamilton catheter is in place.    Osseous structures: Multilevel degenerative changes. Schmorl's node in the superior endplate of L2.  Soft tissues: Small fat-containing periumbilical and bilateral inguinal hernias. Left greater than right bilateral periarticular calcifications of the hips again noted.    IMPRESSION:    Chest:  1. Persistent right basilar  consolidations which could be due to atelectasis and/or pneumonia.  2. Bilateral upper and lower lobe tree-in-bud nodularities which could be infectious or inflammatory.  3. Chronic findings as described above.  4. A 1.2 cm spiculated nodule in the superior segment of the right upper lobe posteriorly. Consider a non-contrast Chest CT at 3 months, a PET/CT, or tissue sampling. These guidelines do not apply to immunocompromised patients and patients with cancer. Follow up in patients with significant comorbidities as clinically warranted. For lung cancer screening, adhere to Lung-RADS guidelines. Reference: Radiology. 2017; 284(1):228-43.    Abdomen of and pelvis:  1. Cholecystostomy pigtail catheter with multiple gallstones, unchanged.  2. Additional chronic findings as described above.      Electronically signed by:  Ralph Craig MD  7/29/2022 6:15 AM CDT Workstation: [id number]                             X-Ray Chest 1 View (Final result)  Result time 07/29/22 07:22:16    Final result by Jayant Rosen MD (07/29/22 07:22:16)                 Narrative:    XR CHEST 1 VIEW    CLINICAL HISTORY:  61 years Female Blood and tube feeding noted by [facility] staff after suctioning trach, Sepsis    COMPARISON: CT chest July 29, 2022    FINDINGS: Endotracheal tube is in place, with tip located at the level of the medial clavicles. Streaky alveolar opacities are present at the right lung base. Left lung appears clear. No pleural effusion. No pneumothorax. No acute osseous abnormality. Chronic appearing fracture deformity of the proximal right humerus.    IMPRESSION:    Streaky alveolar opacities involving the right lower lobe concerning for aspiration pneumonia.    Electronically signed by:  Jayant Rosen MD  7/29/2022 7:22 AM CDT Workstation: [id number]                            X-Rays:   Independently Interpreted Readings:   Other Readings:  Airway midline, no bony abnormality appreciated, cardiac silhouette  enlarged, hemidiaphragm visible bilaterally diffuse bilateral pulmonary opacifications with right lower lobe infiltrate    Medications   vancomycin - pharmacy to dose (has no administration in time range)   acetaminophen tablet 650 mg (has no administration in time range)   acetylcysteine 200 mg/ml (20%) solution 4 mL (has no administration in time range)   albuterol-ipratropium 2.5 mg-0.5 mg/3 mL nebulizer solution 3 mL (3 mLs Nebulization Given 7/29/22 0251)   carboxymethylcellulose sodium 1 % DpGe 1 drop (has no administration in time range)   midodrine tablet 10 mg (10 mg Per G Tube Given 7/29/22 1526)   oxybutynin tablet 5 mg (5 mg Oral Given 7/29/22 0934)   dextrose 10% bolus 125 mL (has no administration in time range)   dextrose 10% bolus 250 mL (has no administration in time range)   meropenem-0.9% sodium chloride 1 g/50 mL IVPB (1 g Intravenous New Bag 7/29/22 1526)   enoxaparin injection 40 mg (40 mg Subcutaneous Given 7/29/22 1133)   famotidine tablet 20 mg (20 mg Per G Tube Given 7/29/22 0934)   lactated ringers infusion (0 mL/hr Intravenous Stopped 7/29/22 0911)   vancomycin 1.25 g in dextrose 5% 250 mL IVPB (ready to mix) (1,250 mg Intravenous Trough Due As Scheduled Before Dose 7/30/22 1500)   NORepinephrine 4 mg in dextrose 5% 250 mL infusion (premix) (titrating) (0.03 mcg/kg/min × 77 kg Intravenous New Bag 7/29/22 1315)   albuterol-ipratropium (DUO-NEB) 2.5 mg-0.5 mg/3 mL nebulizer solution (3 mLs  Given 7/29/22 0242)   vancomycin in dextrose 5 % 1 gram/250 mL IVPB 1,000 mg (0 mg Intravenous Stopped 7/29/22 0601)     And   vancomycin 500 mg in dextrose 5 % 100 mL IVPB (ready to mix system) (0 mg Intravenous Stopped 7/29/22 0702)   albuterol-ipratropium (DUO-NEB) 2.5 mg-0.5 mg/3 mL nebulizer solution (3 mLs  Given 7/29/22 0303)   meropenem (MERREM) 2 g in sodium chloride 0.9% 100 mL IVPB (0 g Intravenous Stopped 7/29/22 1011)     Medical Decision Making:   Initial Assessment:   61-year-old female  who is trach and vent dependent and was a resident at [facility] presenting with concern for an aspiration event after being noted with coughing.  Patient is GCS 3 at baseline.  Hemodynamically stable  Differential Diagnosis:   Aspiration, sepsis, intracranial hemorrhage, intra-abdominal infection, metabolic abnormality  Clinical Tests:   Lab Tests: Ordered  Radiological Study: Ordered  Medical Tests: Ordered  ED Management:  Patient placed on broad-spectrum antibiotics of vancomycin and meropenem.  Patient placed on 30 cc/kg lactated Ringer infusion for 18 hours.  Complaints and presentation with concern of aspiration, sepsis workup initiated patient to be admitted for continued observation and management presumed sepsis and trending of blood cultures.            Attending Attestation:             Attending ED Notes:   Assumed care of patient at 6:00 a.m., found to be positive for urinary tract infection and pneumonia, possible question of aspiration, patient consulted Internal Medicine for further evaluation and management.               Clinical Impression:   Final diagnoses:  [A41.9] Sepsis  [A41.9] Sepsis - sepsis  [J18.9] Pneumonia due to infectious organism, unspecified laterality, unspecified part of lung (Primary)  [N39.0] Urinary tract infection without hematuria, site unspecified          ED Disposition Condition    Admit               Stu Linn MD  07/29/22 2740

## 2022-07-29 NOTE — H&P
[facility] Medicine  History & Physical    Patient Name: Genna Felix  MRN: [id number]  Patient Class: IP- Inpatient  Admission Date: 7/29/2022  Attending Physician: Figueroa Flores MD   Primary Care Provider: Primary Doctor No         Patient information was obtained from ER records and ER MD . EMR Records    Subjective:     Principal Problem:Aspiration pneumonia    Chief Complaint:   Chief Complaint   Patient presents with    Possible Aspiration     Blood and tube feeding noted by [facility] staff after suctioning trach        HPI: Pt got admitted from NH with aspiration pneumonia and Shock  Pt was Hypothermic in ER which resolved soon  Pt has PEG tube/Trach/Tube from gall bladder area/Urine Catheter  Family not unavailable in person or via Phone  CXR showed aspiration pneumonia and pt got admitted in ICU       Past Medical History:   Diagnosis Date    Anoxic brain damage 07/2020    Bedbound 07/2020    Cardiac arrest 07/2020    Cirrhosis     GERD (gastroesophageal reflux disease)     Hemangioma of intra-abdominal structure     Hypertension     Nursing home resident     Protein calorie malnutrition     Pulmonary embolus     Respiratory distress     S/P percutaneous endoscopic gastrostomy (PEG) tube placement 07/2020    Total self-care deficit 07/2020    Tracheostomy dependence     7/2020       Past Surgical History:   Procedure Laterality Date    PEG W/TRACHEOSTOMY PLACEMENT  07/27/2020    SKIN GRAFT      buttocks       Review of patient's allergies indicates:  No Known Allergies    No current facility-administered medications on file prior to encounter.     Current Outpatient Medications on File Prior to Encounter   Medication Sig    arginine/glutamine/calcium bmb (XIOMARA ORAL) 1 Package by PEG Tube route 2 (two) times a day. In 8oz of water    clonazePAM (KLONOPIN) 0.5 MG tablet Take 0.5 mg by mouth 2 (two) times daily.    lactose-reduced food (ISOSOURCE ORAL) 1.5 per peg tube at  50ml/hr continuously    midodrine (PROAMATINE) 10 MG tablet 1 tablet (10 mg total) by Per G Tube route 3 (three) times daily.    oxybutynin (DITROPAN) 5 MG Tab Take 5 mg by mouth 2 (two) times daily.    polyethylene glycol (GLYCOLAX) 17 gram PwPk Take by mouth daily as needed.    scopolamine (TRANSDERM-SCOP) 1.3-1.5 mg (1 mg over 3 days) Place 1 patch onto the skin Every 3 (three) days.    whey protein isolate (BENEPROTEIN) 6 gram-25 kcal/7 gram Powd 7 g by PEG Tube route 2 (two) times a day. In 2oz of water    0.9 % sodium chloride (SODIUM CHLORIDE 0.9 %) PgBk Inject into the vein.    acetaminophen (TYLENOL) 325 MG tablet 2 tablets (650 mg total) by Per G Tube route every 4 (four) hours as needed for Pain or Temperature greater than (100.4F).    acetylcysteine 100 mg/ml, 10%, (MUCOMYST) 100 mg/mL (10 %) nebulizer solution Take 4 mLs by nebulization every 8 (eight) hours.    albuterol-ipratropium (DUO-NEB) 2.5 mg-0.5 mg/3 mL nebulizer solution Take 3 mLs by nebulization every 6 (six) hours. Rescue    artificial tears (ISOPTO TEARS) 0.5 % ophthalmic solution Place 1 drop into both eyes as needed.    chlorhexidine (PERIDEX) 0.12 % solution SMARTSIG:By Mouth    DAPTOmycin (CUBICIN) 500 mg injection Inject into the vein.    ergocalciferol (ERGOCALCIFEROL) 50,000 unit Cap Take 50,000 Units by mouth every 7 days.    ferrous sulfate 300 mg (60 mg iron)/5 mL syrup Take 300 mg by mouth As instructed. Every other day    meropenem (MERREM) 1 gram injection Inject into the vein.     Family History       Problem Relation (Age of Onset)    Hypertension Mother    No Known Problems Father          Tobacco Use    Smoking status: Never Smoker    Smokeless tobacco: Never Used   Substance and Sexual Activity    Alcohol use: Not Currently    Drug use: Not Currently    Sexual activity: Not Currently     Review of Systems   Unable to perform ROS: Patient nonverbal   Objective:     Vital Signs (Most Recent):  Temp: 96.9  °F (36.1 °C) (07/29/22 1515)  Pulse: 89 (07/29/22 1708)  Resp: 12 (07/29/22 1708)  BP: (!) 76/46 (07/29/22 1700)  SpO2: 98 % (07/29/22 1708)   Vital Signs (24h Range):  Temp:  [96.5 °F (35.8 °C)-97.6 °F (36.4 °C)] 96.9 °F (36.1 °C)  Pulse:  [47-93] 89  Resp:  [11-41] 12  SpO2:  [91 %-100 %] 98 %  BP: ()/(43-93) 76/46     Weight: 77 kg (169 lb 12.8 oz)  Body mass index is 33.16 kg/m².    Physical Exam  Vitals and nursing note reviewed.   Constitutional:       General: She is not in acute distress.     Appearance: She is ill-appearing.   HENT:      Head: Atraumatic.      Right Ear: External ear normal.      Left Ear: External ear normal.      Nose: Nose normal.      Mouth/Throat:      Mouth: Mucous membranes are moist.   Eyes:      General: No scleral icterus.  Neck:      Comments: Trach   Cardiovascular:      Rate and Rhythm: Normal rate.   Pulmonary:      Effort: Pulmonary effort is normal.   Abdominal:      Palpations: Abdomen is soft.      Comments: PEG   Tube from gall bladder area   Genitourinary:     Comments: Hamilton catheter  Skin:     General: Skin is warm.   Neurological:      Mental Status: Mental status is at baseline.           Significant Labs: All pertinent labs within the past 24 hours have been reviewed.  CBC:   Recent Labs   Lab 07/29/22  0321 07/29/22  0337   WBC  --  4.33   HGB  --  11.0*   HCT 34* 36.2*   PLT  --  185     CMP:   Recent Labs   Lab 07/29/22  0337   *   K 5.1   CL 96   CO2 23   GLU 97   BUN 31*   CREATININE <0.3*   CALCIUM 8.8   PROT 9.3*   ALBUMIN 3.5   BILITOT 0.9   ALKPHOS 198*   AST 39   ALT 69*   ANIONGAP 9   EGFRNONAA >60.0       Significant Imaging: I have reviewed all pertinent imaging results/findings within the past 24 hours.    Assessment/Plan:     * Aspiration pneumonia  Continue present iv abx to cover aspiration and hospital acquired pneumonia(Pt lives in NH and Trach/Vent dependent)      Shock, unspecified  Continue iv fluids/Pressors      Ventilator  dependence  Stable  Chronic issue       UTI (urinary tract infection)  Catheter associated  Vs Contaminant  UC  Iv abx       Cirrhosis of liver  Aware       Pressure injury of left buttock, stage 4  Aware       Chronic respiratory failure with hypoxia  Trach/Vent dependent      PEG (percutaneous endoscopic gastrostomy) status  Stable  Chronic issue       Tracheostomy dependence  Stable  Chronic issue       Persistent vegetative state  Stable  Chronic issue       Anemia of chronic disease  Stable  Chronic issue         VTE Risk Mitigation (From admission, onward)         Ordered     enoxaparin injection 40 mg  Every 24 hours (non-standard times)         07/29/22 0806     IP VTE HIGH RISK PATIENT  Once         07/29/22 0805     Place sequential compression device  Until discontinued         07/29/22 0805                   Figueroa Flores MD  Department of Hospital Medicine   [facility]

## 2022-07-29 NOTE — HPI
Pt got admitted from NH with aspiration pneumonia and Shock  Pt was Hypothermic in ER which resolved soon  Pt has PEG tube/Trach/Tube from gall bladder area/Urine Catheter  Family not unavailable in person or via Phone  CXR showed aspiration pneumonia and pt got admitted in ICU

## 2022-07-29 NOTE — ASSESSMENT & PLAN NOTE
Continue present iv abx to cover aspiration and hospital acquired pneumonia(Pt lives in NH and Trach/Vent dependent)

## 2022-07-29 NOTE — CARE UPDATE
07/29/22 0242   Patient Assessment/Suction   Level of Consciousness (AVPU) responds to pain   Respiratory Effort Normal;Unlabored   Expansion/Accessory Muscles/Retractions no use of accessory muscles   All Lung Fields Breath Sounds coarse;wheezes, inspiratory;wheezes, expiratory   Cough Frequency with stimulation   Cough Type productive   $ Suction Charges Inline Suction Procedure Stat Charge   Secretions Amount moderate   Secretions Color yellow;green   Secretions Characteristics thick;plug   Sputum Collection sample obtained per suctioning   $ Swab or suction? Suction   Aspirate Toleration TENA (no adverse reactions)   PRE-TX-O2   O2 Device (Oxygen Therapy) ventilator   Oxygen Concentration (%) 50   SpO2 100 %   Pulse Oximetry Type Continuous   $ Pulse Oximetry - Multiple Charge Pulse Oximetry - Multiple   Pulse (!) 53   Resp 17   /77   Aerosol Therapy   $ Aerosol Therapy Charges Aerosol Treatment   Daily Review of Necessity (SVN) completed   Respiratory Treatment Status (SVN) given   Treatment Route (SVN) in-line   Patient Position (SVN) semi-Le's   Post Treatment Assessment (SVN) increased aeration   Signs of Intolerance (SVN) none   Breath Sounds Post-Respiratory Treatment   Throughout All Fields Post-Treatment All Fields   Throughout All Fields Post-Treatment aeration increased   Post-treatment Heart Rate (beats/min) 52   Post-treatment Resp Rate (breaths/min) 21   Vent Select   Conventional Vent Y   Ventilator Initiated Yes   $ Ventilator Initial 1   Charged w/in last 24h YES   Preset Conventional Ventilator Settings   Vent ID 12   Vent Type    Ventilation Type VC   Vent Mode A/C   Humidity HME   Vt Set 450 mL   PEEP/CPAP 5 cmH20   Peak Flow 60 L/min   Patient Ventilator Parameters   Resp Rate Total 16 br/min   Peak Airway Pressure 33 cmH2O   Mean Airway Pressure 13 cmH20   Exhaled Vt 486 mL   Total Ve 13.1 mL   Tubing ID (mm) 8 mm  (portex)   Tube Type Trach   Conventional Ventilator Alarms    Alarms On Y   Ready to Wean/Extubation Screen   FIO2<=50 (chart decimal) 0.5   MV<16L (chart vol.) 13.1   PEEP <=8 (chart #) 5   Ready to Wean Parameters   F/VT Ratio<105 (RSBI) (!) 34.98   Labs   $ Was an ABG obtained? Venous Puncture;ISTAT - Blood gas;ISTAT - Calcium;ISTAT - Hematocrit;ISTAT - Potassium;ISTAT - Sodium   $ Labs Tech Time 15 min   Critical Value Communication   Date Result Received 07/29/22   Time Result Received 0242   Resulting Department of Critical Value resp   Who communicated critical value from resulting department? ddw, rrt   Name of Notified Physician/Designee md pepe   Date Notified 07/29/22   Time Notified 0321   Maintain adequate oxygenation/ventilation

## 2022-07-29 NOTE — CONSULTS
Pulmonary/Critical Care Consult      Patient name: Genna Felix  MRN: [id number]  Date: 07/29/2022    Admit Date: 7/29/2022  Consult Requested By: Figueroa Flores MD    Reason for Consult: Respiratory failure, chronic, aspiration    HPI:    7/29/2022 - 60 yo with h/o GERD, HTN, PE, trach, vent, anoxic brain injury sent to ER from [place] for coughing and possible aspiration.  In ER was hypothermic, procalcitonin was low, UA showed UTI.  CT chest reviewed and shows chronic changes, a nodule and some inflammatory changes.  Has QTc prolongation.  I ICU was hypotensive despite fluid bolus and pressors are being started.  I d./w her sister and updated her and attempted to call her daughter to discuss code status but no answer.    Review of Systems    Review of Systems   Unable to perform ROS: Mental acuity       Past Medical History    Past Medical History:   Diagnosis Date    Anoxic brain damage 07/2020    Bedbound 07/2020    Cardiac arrest 07/2020    Cirrhosis     GERD (gastroesophageal reflux disease)     Hemangioma of intra-abdominal structure     Hypertension     Nursing home resident     Protein calorie malnutrition     Pulmonary embolus     Respiratory distress     S/P percutaneous endoscopic gastrostomy (PEG) tube placement 07/2020    Total self-care deficit 07/2020    Tracheostomy dependence     7/2020       Past Surgical History    Past Surgical History:   Procedure Laterality Date    PEG W/TRACHEOSTOMY PLACEMENT  07/27/2020    SKIN GRAFT      buttocks       Medications (scheduled):      acetylcysteine 200 mg/ml (20%)  4 mL Nebulization Q8H    albuterol-ipratropium  3 mL Nebulization Q6H    enoxparin  40 mg Subcutaneous Q24H    famotidine  20 mg Per G Tube BID    meropenem (MERREM) IVPB  1 g Intravenous Q8H    midodrine  10 mg Per G Tube TID    oxybutynin  5 mg Oral BID    vancomycin (VANCOCIN) IVPB  1,250 mg Intravenous Q12H       Medications (infusions):      sodium chloride 0.9% 75 mL/hr at  07/29/22 1209    lactated ringers Stopped (07/29/22 0911)    NORepinephrine bitartrate-D5W 0.03 mcg/kg/min (07/29/22 1315)       Medications (prn):     acetaminophen, carboxymethylcellulose sodium, dextrose 10%, dextrose 10%, Pharmacy to dose Vancomycin consult **AND** vancomycin - pharmacy to dose    Family History:   Family History   Problem Relation Age of Onset    No Known Problems Mother     No Known Problems Father        Social History: Tobacco:   Social History     Tobacco Use   Smoking Status Never Smoker   Smokeless Tobacco Never Used                                EtOH:   Social History     Substance and Sexual Activity   Alcohol Use Not Currently                                Drugs:   Social History     Substance and Sexual Activity   Drug Use Not Currently         Physical Exam    Vital signs:  Temp:  [96.5 °F (35.8 °C)-97.6 °F (36.4 °C)]   Pulse:  [47-67]   Resp:  [16-41]   BP: ()/(52-93)   SpO2:  [91 %-100 %]     Intake/Output:     Intake/Output Summary (Last 24 hours) at 7/29/2022 1352  Last data filed at 7/29/2022 1126  Gross per 24 hour   Intake 350 ml   Output 60 ml   Net 290 ml        BMI: Estimated body mass index is 33.16 kg/m² as calculated from the following:    Height as of this encounter: 5' (1.524 m).    Weight as of this encounter: 77 kg (169 lb 12.8 oz).    Physical Exam  Vitals and nursing note reviewed.   Constitutional:       General: She is not in acute distress.     Appearance: She is ill-appearing (chronically). She is not toxic-appearing or diaphoretic.      Comments: Eyes open, no response and does not fix on objects   HENT:      Head: Normocephalic and atraumatic.      Right Ear: External ear normal.      Left Ear: External ear normal.      Nose: Nose normal. No congestion or rhinorrhea.      Mouth/Throat:      Mouth: Mucous membranes are moist.      Pharynx: Oropharynx is clear. No oropharyngeal exudate or posterior oropharyngeal erythema.   Eyes:      General: No  scleral icterus.        Right eye: No discharge.         Left eye: No discharge.      Extraocular Movements: Extraocular movements intact.      Conjunctiva/sclera: Conjunctivae normal.      Pupils: Pupils are equal, round, and reactive to light.      Comments: scleritis   Neck:      Vascular: No carotid bruit.      Comments: Trach   Cardiovascular:      Rate and Rhythm: Normal rate and regular rhythm.      Pulses: Normal pulses.      Heart sounds: No murmur heard.    No friction rub. No gallop.   Pulmonary:      Effort: Pulmonary effort is normal. No respiratory distress.      Breath sounds: No stridor. Rhonchi present. No wheezing or rales.      Comments: Ventilated   Chest:      Chest wall: No tenderness.   Abdominal:      General: There is no distension.      Tenderness: There is no abdominal tenderness. There is no guarding.      Comments: PEG  Biliary drain in place  Hypoactive BS   Genitourinary:     Comments: carroll  Musculoskeletal:         General: No swelling. Normal range of motion.      Cervical back: Normal range of motion and neck supple. No rigidity or tenderness.      Right lower leg: No edema.      Left lower leg: No edema.      Comments: + contractures   Lymphadenopathy:      Cervical: No cervical adenopathy.   Skin:     General: Skin is warm and dry.      Coloration: Skin is not jaundiced.      Findings: No bruising.   Neurological:      Comments: Unresponsive  Chronic changes  No acute changes reported   Psychiatric:      Comments: Calm          Laboratory    Recent Labs   Lab 07/29/22 0337   WBC 4.33   RBC 4.19   HGB 11.0*   HCT 36.2*      MCV 86   MCH 26.3*   MCHC 30.4*       Recent Labs   Lab 07/29/22 0337   CALCIUM 8.8   PROT 9.3*   *   K 5.1   CO2 23   CL 96   BUN 31*   CREATININE <0.3*   ALKPHOS 198*   ALT 69*   AST 39   BILITOT 0.9       No results for input(s): PT, INR, APTT in the last 24 hours.    Recent Labs   Lab 07/29/22 0337   TROPONINI <0.030       Additional labs:  reviewed    Microbiology:       Microbiology Results (last 7 days)     Procedure Component Value Units Date/Time    Culture, Respiratory with Gram Stain [[id number]] Collected: 07/29/22 0306    Order Status: Completed Specimen: Respiratory from Tracheal Aspirate Updated: 07/29/22 1348     Gram Stain (Respiratory) Many WBC's     Gram Stain (Respiratory) <10 epithelial cells per low power field.     Gram Stain (Respiratory) Few Gram negative rods    Blood culture #2 **CANNOT BE ORDERED STAT** [[id number]] Collected: 07/29/22 0345    Order Status: Completed Specimen: Blood from Peripheral, Forearm, Right Updated: 07/29/22 1117     Blood Culture, Routine No Growth to date    Blood culture #1 **CANNOT BE ORDERED STAT** [[id number]] Collected: 07/29/22 0320    Order Status: Completed Specimen: Blood from Peripheral, Hand, Right Updated: 07/29/22 1117     Blood Culture, Routine No Growth to date    Urine Culture High Risk [[id number]]     Order Status: Completed Specimen: Urine, Catheterized     Urine Culture High Risk [[id number]]     Order Status: Canceled Specimen: Urine, Catheterized     Urine culture [[id number]] Collected: 07/29/22 0545    Order Status: No result Specimen: Urine Updated: 07/29/22 0701          Radiology    X-Ray Chest 1 View  XR CHEST 1 VIEW    CLINICAL HISTORY:  61 years Female Blood and tube feeding noted by [facility] staff after suctioning trach, Sepsis    COMPARISON: CT chest July 29, 2022    FINDINGS: Endotracheal tube is in place, with tip located at the level of the medial clavicles. Streaky alveolar opacities are present at the right lung base. Left lung appears clear. No pleural effusion. No pneumothorax. No acute osseous abnormality. Chronic appearing fracture deformity of the proximal right humerus.    IMPRESSION:    Streaky alveolar opacities involving the right lower lobe concerning for aspiration pneumonia.    Electronically signed by:  Jayant Rosen MD  7/29/2022 7:22 AM CDT Workstation:  [id number]  CT Head Without Contrast  Narrative: EXAMINATION:  CT HEAD WITHOUT CONTRAST    CLINICAL HISTORY:  sepsis with concern for aspiration;    TECHNIQUE:  CMS Mandated Quality Data-CT Radiation Dose-436    All CT scans at this facility dose modulation, iterative reconstruction, and or weight-based dosing when appropriate to reduce radiation dose to as low as reasonably achievable.    COMPARISON:  CT head 01/20/2022    FINDINGS:  Profound cerebral atrophy with disproportionate enlargement of lateral, 3rd, and 4th ventricles, stable compared to prior.  Diffuse decreased attenuation and loss of gray-white differentiation involving both cerebral hemispheres, similar to prior.  Negative for acute intracranial hemorrhage.  Cerebellar and brainstem atrophy.  Atherosclerotic calcification of intracranial carotid arteries.    No acute calvarial abnormality.  Mastoid air cells are clear.  Mild sphenoid sinus mucosal thickening.  Impression: Stable CT head demonstrating profound cerebral atrophy, diffuse ventricular enlargement, as well as diffuse cerebral hypoattenuation and loss of gray-white differentiation.    Electronically signed by: Jayant Rosen MD  Date:    07/29/2022  Time:    06:41  CT Chest Abdomen Pelvis Without Contrast (XPD)  EXAM DESCRIPTION:  CT CHEST ABDOMEN PELVIS WITHOUT CONTRAST(XPD)    CLINICAL HISTORY:  61 years  Female  Sepsis    TECHNIQUE:  Trauma CT chest, abdomen, pelvis protocol without intravenous [contrast. Coronal and sagittal reformats were performed. All CT scans at this facility use dose modulation, iterative reconstruction, and/or weight based dosing when appropriate to reduce radiation dose to as low as reasonably achievable.    COMPARISON: CT abdomen and pelvis 5/21/2022.    FINDINGS:    Chest:  Lungs: Persistent right basilar consolidations. Bilateral upper and lower lobe tree-in-bud nodularities. Spiculated nodule in the superior segment of the right upper lobe posteriorly  measuring 1.2 x 1.1 cm. Airway is patent and tracheostomy tube in place.  Pleura: No pneumothorax. No pleural effusion.  Mediastinum: No mediastinal mass or adenopathy. Heart is normal in size. No pericardial effusion. Atherosclerotic calcification of the coronary arteries.  Vascular: Grossly unremarkable.  Bones: Degenerative changes of the spine and bilateral shoulder joints.  Soft tissues: Unremarkable.    Abdomen/Pelvis:  Liver: Unremarkable.  Gallbladder: Cholecystostomy pigtail catheter with multiple gallstones, unchanged.  Pancreas: Within normal limits.  Spleen: Within normal limits.  Kidney: No stone or hydronephrosis.  Adrenal glands: Within normal limits.  Vascular structures: Unremarkable.    Bowel: Gastrostomy tube in place. No bowel distention.  Appendix: Normal.  Peritoneum/retroperitoneum: No free fluid or free air.    Lymph Nodes: No lymphadenopathy.  Reproductive: Unremarkable.  Urinary bladder: Decompressed with diffuse urinary bladder wall thickening and a Hamilton catheter is in place.    Osseous structures: Multilevel degenerative changes. Schmorl's node in the superior endplate of L2.  Soft tissues: Small fat-containing periumbilical and bilateral inguinal hernias. Left greater than right bilateral periarticular calcifications of the hips again noted.    IMPRESSION:    Chest:  1. Persistent right basilar consolidations which could be due to atelectasis and/or pneumonia.  2. Bilateral upper and lower lobe tree-in-bud nodularities which could be infectious or inflammatory.  3. Chronic findings as described above.  4. A 1.2 cm spiculated nodule in the superior segment of the right upper lobe posteriorly. Consider a non-contrast Chest CT at 3 months, a PET/CT, or tissue sampling. These guidelines do not apply to immunocompromised patients and patients with cancer. Follow up in patients with significant comorbidities as clinically warranted. For lung cancer screening, adhere to Lung-RADS guidelines.  Reference: Radiology. 2017; 284(1):228-43.    Abdomen of and pelvis:  1. Cholecystostomy pigtail catheter with multiple gallstones, unchanged.  2. Additional chronic findings as described above.    Electronically signed by:  Ralph Craig MD  7/29/2022 6:15 AM CDT Workstation: [id number]        Additional Studies    reviewed    Ventilator Information    Vent Mode: A/C  Oxygen Concentration (%):  [50] 50  Resp Rate Total:  [16 br/min-17 br/min] 16 br/min  Vt Set:  [450 mL] 450 mL  PEEP/CPAP:  [5 cmH20] 5 cmH20  Pressure Support:  [0 cmH20] 0 cmH20  Mean Airway Pressure:  [10 fnC72-56 cmH20] 10 cmH20         Recent Labs     07/29/22  0321   PH 7.359   PCO2 49.1*   PO2 45   HCO3 27.7   POCSATURATED 78*   BE 2         Impression    Active Hospital Problems    Diagnosis  POA    *Aspiration pneumonia [J69.0]  Unknown    Ventilator dependence [Z99.11]  Not Applicable    Cirrhosis of liver [K74.60]  Yes    Chronic respiratory failure with hypoxia [J96.11]  Yes    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  Added automatically from request for surgery [id number]        Resolved Hospital Problems   No resolved problems to display.       Plan    · Continue vent support - vent dependent  · IVF, pressors as needed  · Broad spectrum antibiotics band follow up cultures  · Contact isolation  · Nebs and mucomyst to help with secretions  · Try to discuss with family about code status  · Follow Na, follow H/H (no acute bleeding reported)    Thank you for this consult.  I will follow with you while the patient is hospitalized.      Critical Care Time    I have spent > 35 minutes providing critical care services for this pt for the above  diagnoses.  These services have included pt evaluation, pt exam, ventilator assessment, volume management and pressors, discussion with family, discussions with staff, chart review, data review, note preparation and .  Medications have been reviewed and adjusted as needed.  The patient has life threatening illness with a high risk of decompensation and/or death.      Reji Andrews MD  [facility] Pulmonary/Critical Care

## 2022-07-29 NOTE — ED NOTES
"Pt to ed per EMS s/p "respiratory stated that she began vomiting after being suctioned and may have aspirated" pt noted in her "normal status" per EMS report / pt eyes open / pt noted with multiple contractures / break down regions / pt noted with carroll in place pta / PEG tube to left upper abd / trach in place pta / pt placed on vent : AC/16/450/50%/+5 at this time / RRT at side / ERMD at side  "

## 2022-07-29 NOTE — ANESTHESIA PROCEDURE NOTES
Arterial line    Diagnosis: Hypotension requiring norepinephrine infusion    Patient location during procedure: ICU  Procedure start time: 7/29/2022 3:05 PM  Timeout: 7/29/2022 3:05 PM  Procedure end time: 7/29/2022 3:25 PM    Staffing  Authorizing Provider: Tolu Cervantes MD  Performing Provider: Tolu Cervantes MD    Anesthesiologist was present at the time of the procedure.    Preanesthetic Checklist  Completed: patient identified, risks and benefits discussed, timeout performed and anesthesia consent givenArterial line  Skin Prep: chlorhexidine gluconate  Local Infiltration: lidocaine  Orientation: right  Location: radial    Catheter Size: 20 G  Catheter placement by Ultrasound guidance. Heme positive aspiration all ports.   Vessel Caliber: medium, patent, compressibility normal  Vascular Doppler:  not done  Needle advanced into vessel with real time Ultrasound guidance.  Sterile sheath used.Insertion Attempts: 3  Assessment  Dressing: sutured in place and taped and tegaderm  Patient: Tolerated well  Additional Notes  Right radial arterial line accessed at 3 different sites but unable to pass guidewire or catheter.  Procedure abandoned.  Pressure held until good hemostasis.  Blood pressure cuff working well and systolic blood pressure now about 100. Left arm too contracted to place arterial line.  Re-consult if needed.

## 2022-07-29 NOTE — SUBJECTIVE & OBJECTIVE
Past Medical History:   Diagnosis Date    Anoxic brain damage 07/2020    Bedbound 07/2020    Cardiac arrest 07/2020    Cirrhosis     GERD (gastroesophageal reflux disease)     Hemangioma of intra-abdominal structure     Hypertension     Nursing home resident     Protein calorie malnutrition     Pulmonary embolus     Respiratory distress     S/P percutaneous endoscopic gastrostomy (PEG) tube placement 07/2020    Total self-care deficit 07/2020    Tracheostomy dependence     7/2020       Past Surgical History:   Procedure Laterality Date    PEG W/TRACHEOSTOMY PLACEMENT  07/27/2020    SKIN GRAFT      buttocks       Review of patient's allergies indicates:  No Known Allergies    No current facility-administered medications on file prior to encounter.     Current Outpatient Medications on File Prior to Encounter   Medication Sig    arginine/glutamine/calcium bmb (XIOMARA ORAL) 1 Package by PEG Tube route 2 (two) times a day. In 8oz of water    clonazePAM (KLONOPIN) 0.5 MG tablet Take 0.5 mg by mouth 2 (two) times daily.    lactose-reduced food (ISOSOURCE ORAL) 1.5 per peg tube at 50ml/hr continuously    midodrine (PROAMATINE) 10 MG tablet 1 tablet (10 mg total) by Per G Tube route 3 (three) times daily.    oxybutynin (DITROPAN) 5 MG Tab Take 5 mg by mouth 2 (two) times daily.    polyethylene glycol (GLYCOLAX) 17 gram PwPk Take by mouth daily as needed.    scopolamine (TRANSDERM-SCOP) 1.3-1.5 mg (1 mg over 3 days) Place 1 patch onto the skin Every 3 (three) days.    whey protein isolate (BENEPROTEIN) 6 gram-25 kcal/7 gram Powd 7 g by PEG Tube route 2 (two) times a day. In 2oz of water    0.9 % sodium chloride (SODIUM CHLORIDE 0.9 %) PgBk Inject into the vein.    acetaminophen (TYLENOL) 325 MG tablet 2 tablets (650 mg total) by Per G Tube route every 4 (four) hours as needed for Pain or Temperature greater than (100.4F).    acetylcysteine 100 mg/ml, 10%, (MUCOMYST) 100 mg/mL (10 %) nebulizer solution Take 4 mLs by  nebulization every 8 (eight) hours.    albuterol-ipratropium (DUO-NEB) 2.5 mg-0.5 mg/3 mL nebulizer solution Take 3 mLs by nebulization every 6 (six) hours. Rescue    artificial tears (ISOPTO TEARS) 0.5 % ophthalmic solution Place 1 drop into both eyes as needed.    chlorhexidine (PERIDEX) 0.12 % solution SMARTSIG:By Mouth    DAPTOmycin (CUBICIN) 500 mg injection Inject into the vein.    ergocalciferol (ERGOCALCIFEROL) 50,000 unit Cap Take 50,000 Units by mouth every 7 days.    ferrous sulfate 300 mg (60 mg iron)/5 mL syrup Take 300 mg by mouth As instructed. Every other day    meropenem (MERREM) 1 gram injection Inject into the vein.     Family History       Problem Relation (Age of Onset)    Hypertension Mother    No Known Problems Father          Tobacco Use    Smoking status: Never Smoker    Smokeless tobacco: Never Used   Substance and Sexual Activity    Alcohol use: Not Currently    Drug use: Not Currently    Sexual activity: Not Currently     Review of Systems   Unable to perform ROS: Patient nonverbal   Objective:     Vital Signs (Most Recent):  Temp: 96.9 °F (36.1 °C) (07/29/22 1515)  Pulse: 89 (07/29/22 1708)  Resp: 12 (07/29/22 1708)  BP: (!) 76/46 (07/29/22 1700)  SpO2: 98 % (07/29/22 1708)   Vital Signs (24h Range):  Temp:  [96.5 °F (35.8 °C)-97.6 °F (36.4 °C)] 96.9 °F (36.1 °C)  Pulse:  [47-93] 89  Resp:  [11-41] 12  SpO2:  [91 %-100 %] 98 %  BP: ()/(43-93) 76/46     Weight: 77 kg (169 lb 12.8 oz)  Body mass index is 33.16 kg/m².    Physical Exam  Vitals and nursing note reviewed.   Constitutional:       General: She is not in acute distress.     Appearance: She is ill-appearing.   HENT:      Head: Atraumatic.      Right Ear: External ear normal.      Left Ear: External ear normal.      Nose: Nose normal.      Mouth/Throat:      Mouth: Mucous membranes are moist.   Eyes:      General: No scleral icterus.  Neck:      Comments: Trach   Cardiovascular:      Rate and Rhythm: Normal rate.   Pulmonary:       Effort: Pulmonary effort is normal.   Abdominal:      Palpations: Abdomen is soft.      Comments: PEG   Tube from gall bladder area   Genitourinary:     Comments: Hamilton catheter  Skin:     General: Skin is warm.   Neurological:      Mental Status: Mental status is at baseline.           Significant Labs: All pertinent labs within the past 24 hours have been reviewed.  CBC:   Recent Labs   Lab 07/29/22  0321 07/29/22  0337   WBC  --  4.33   HGB  --  11.0*   HCT 34* 36.2*   PLT  --  185     CMP:   Recent Labs   Lab 07/29/22  0337   *   K 5.1   CL 96   CO2 23   GLU 97   BUN 31*   CREATININE <0.3*   CALCIUM 8.8   PROT 9.3*   ALBUMIN 3.5   BILITOT 0.9   ALKPHOS 198*   AST 39   ALT 69*   ANIONGAP 9   EGFRNONAA >60.0       Significant Imaging: I have reviewed all pertinent imaging results/findings within the past 24 hours.

## 2022-07-29 NOTE — PLAN OF CARE
Problem: Feeding Intolerance (Enteral Nutrition)  Goal: Feeding Tolerance  Intervention: Prevent and Manage Feeding Intolerance  Flowsheets (Taken 7/29/2022 1633)  Nutrition Support Management: tube feeding initiated

## 2022-07-29 NOTE — CONSULTS
[facility]  Adult Nutrition   Consult Note (Nutrition Support Management)    SUMMARY     Recommendations  Recommendation/Intervention:   1) Start Jevity 1.5 at 25ml/hr and advance 25ml after 4 hours to goal rate 50ml/hr to provide 1800kcal, 77gm Protein, 912ml free fluid.   2) Benny 1pkt BID. 3) Water flushes: 30ml every 4 hours    Goals: Patient to meet >75% of EEN/EPN via TF  Nutrition Goal Status: new  Communication of RD Recs: reviewed with RN    Dietitian Rounds Brief  Patient has hx of TF, admitted for possible aspiration. Consult for TF management. Patient has trach, vent dependent.     Diet order:   Current Diet Order: NPO     Evaluation of Received Nutrient/Fluid Intake  Energy Calories Required: not meeting needs  Protein Required: not meeting needs     % Intake of Estimated Energy Needs: 0%  % Meal Intake: NPO      Intake/Output Summary (Last 24 hours) at 7/29/2022 1632  Last data filed at 7/29/2022 1600  Gross per 24 hour   Intake 350 ml   Output 185 ml   Net 165 ml        Anthropometrics  Temp: 96.9 °F (36.1 °C)  Height: 5' (152.4 cm)  Height (inches): 60 in  Weight Method: Bed Scale  Weight: 77 kg (169 lb 12.8 oz)  Weight (lb): 169.8 lb  Ideal Body Weight (IBW), Female: 100 lb  % Ideal Body Weight, Female (lb): 169.8 %  BMI (Calculated): 33.2  BMI Grade: 30 - 34.9- obesity - grade I    Estimated/Assessed Needs  Weight Used For Calorie Calculations: 77 kg (169 lb 12.1 oz)     Energy Need Method: [place]  Protein Requirements: 68-91gm (1.5-2gm/kg IBW)  Weight Used For Protein Calculations: 45.5 kg (100 lb 5 oz) (IBW)  Fluid Requirements (mL): 1925 (25ml/kg)    Reason for Assessment  Reason For Assessment: consult, new tube feeding  Diagnosis:  (Aspiration pneumonia)  Relevant Medical History: trach dependent, PEG, cirrhosis, malnutrition    Nutrition/Diet History  Food Allergies: NKFA  Factors Affecting Nutritional Intake: NPO, on mechanical ventilation  Nutrition Support Formula Prior  to Admit: Isosource 1.5  Nutrition Support Rate Prior to Admit: 50 (ml)  Nutrtion Support Frequency Prior to Admit: ml/hr  Nutrition Support Provision Prior to Admit: 1800kcal, 57gm Protein    Nutrition Risk Screen  Nutrition Risk Screen: tube feeding or parenteral nutrition    Weight History:  Wt Readings from Last 10 Encounters:   07/29/22 77 kg (169 lb 12.8 oz)   03/25/22 70.9 kg (156 lb 4.9 oz)   01/26/22 69.8 kg (153 lb 14.1 oz)   12/26/21 62.6 kg (138 lb)   10/26/21 62.8 kg (138 lb 7.2 oz)   10/22/21 61.6 kg (135 lb 12.9 oz)   05/14/21 63 kg (138 lb 14.2 oz)   04/26/21 63 kg (138 lb 14.2 oz)        Lab/Procedures/Meds: Pertinent Labs/Meds Reviewed    Medications:Pertinent Medications Reviewed  Scheduled Meds:   acetylcysteine 200 mg/ml (20%)  4 mL Nebulization Q8H    albuterol-ipratropium  3 mL Nebulization Q6H    enoxparin  40 mg Subcutaneous Q24H    famotidine  20 mg Per G Tube BID    meropenem (MERREM) IVPB  1 g Intravenous Q8H    midodrine  10 mg Per G Tube TID    oxybutynin  5 mg Oral BID    vancomycin (VANCOCIN) IVPB  1,250 mg Intravenous Q12H     Continuous Infusions:   NORepinephrine bitartrate-D5W 0.03 mcg/kg/min (07/29/22 1315)     PRN Meds:.acetaminophen, carboxymethylcellulose sodium, dextrose 10%, dextrose 10%, Pharmacy to dose Vancomycin consult **AND** vancomycin - pharmacy to dose    Labs: Pertinent Labs Reviewed  Clinical Chemistry:  Recent Labs   Lab 07/29/22  0337   *   K 5.1   CL 96   CO2 23   GLU 97   BUN 31*   CREATININE <0.3*   CALCIUM 8.8   PROT 9.3*   ALBUMIN 3.5   BILITOT 0.9   ALKPHOS 198*   AST 39   ALT 69*   ANIONGAP 9   ESTGFRAFRICA >60.0   EGFRNONAA >60.0   MG 1.9   LIPASE 20     CBC:   Recent Labs   Lab 07/29/22  0337   WBC 4.33   RBC 4.19   HGB 11.0*   HCT 36.2*      MCV 86   MCH 26.3*   MCHC 30.4*     Cardiac Profile:  Recent Labs   Lab 07/29/22  0337   BNP 29   TROPONINI <0.030     Monitor and Evaluation  Food and Nutrient Intake: enteral nutrition  intake  Food and Nutrient Adminstration: enteral and parenteral nutrition administration  Physical Activity and Function: nutrition-related ADLs and IADLs  Anthropometric Measurements: weight change  Biochemical Data, Medical Tests and Procedures: electrolyte and renal panel, gastrointestinal profile, glucose/endocrine profile  Nutrition-Focused Physical Findings: overall appearance     Nutrition Risk  Level of Risk/Frequency of Follow-up: high     Nutrition Follow-Up  RD Follow-up?: Yes      Nica Miller RD 07/29/2022 4:32 PM

## 2022-07-29 NOTE — ANESTHESIA PROCEDURE NOTES
Central Line    Diagnosis: Dr Andrews  Patient location during procedure: ICU  Timeout: 7/29/2022 2:45 PM  Procedure end time: 7/29/2022 3:00 PM    Staffing  Authorizing Provider: Tolu Cervantes MD  Performing Provider: Tolu Cervantes MD    Staffing  Performed: anesthesiologist   Anesthesiologist: Tolu Cervantes MD  Anesthesiologist was present at the time of the procedure.  Preanesthetic Checklist  Completed: patient identified, site marked, risks and benefits discussed, monitors and equipment checked, timeout performed and anesthesia consent given  Indication   Indication: vascular access     Anesthesia   local infiltration    Central Line   Skin Prep: skin prepped with ChloraPrep, skin prep agent completely dried prior to procedure  Sterile Barriers Followed: Yes    All five maximal barriers used- gloves, gown, cap, mask, and large sterile sheet    hand hygiene performed prior to central venous catheter insertion  Location: right subclavian.   Catheter type: triple lumen  Catheter Size: 7 Fr  Inserted Catheter Length: 15 cm  Ultrasound: none     Manometry: none  Insertion Attempts: 1   Securement:line sutured, chlorhexidine patch, sterile dressing applied and blood return through all ports    Post-Procedure   X-Ray: no pneumothorax on x-ray, placement verified by x-ray, tip termination and successful placement  Tip termination comments: SVC   Adverse Events:none      Guidewire Guidewire removed intact.   Additional Notes  No indication of arterial puncture at any time.  All ports aspirated and flushed easily.

## 2022-07-29 NOTE — PROGRESS NOTES
Pharmacokinetic Initial Assessment: IV Vancomycin    Assessment/Plan:    Initiate intravenous vancomycin with loading dose of 1500 mg once followed by a maintenance dose of vancomycin 1250mg IV every 12 hours  Desired empiric serum trough concentration is 15 to 20 mcg/mL  Draw vancomycin trough level 60 min prior to fourth dose on 7/30 at approximately 15:00  Pharmacy will continue to follow and monitor vancomycin.      Please contact pharmacy at extension [phone] with any questions regarding this assessment.     Thank you for the consult,   Tawannarenita Pirece       Patient brief summary:  Genna Felix is a 61 y.o. female initiated on antimicrobial therapy with IV Vancomycin for treatment of suspected bacteremia    Drug Allergies:   Review of patient's allergies indicates:  No Known Allergies    Actual Body Weight:   77kg     Renal Function:   CrCl cannot be calculated (This lab value cannot be used to calculate CrCl because it is not a number: <0.3).,       CBC (last 72 hours):  Recent Labs   Lab Result Units 07/29/22  0337   WBC K/uL 4.33   Hemoglobin g/dL 11.0*   Hematocrit % 36.2*   Platelets K/uL 185   Gran % % 68.6   Lymph % % 20.8   Mono % % 6.7   Eosinophil % % 3.5   Basophil % % 0.2   Differential Method  Automated       Metabolic Panel (last 72 hours):  Recent Labs   Lab Result Units 07/29/22  0337 07/29/22  0545   Sodium mmol/L 128*  --    Potassium mmol/L 5.1  --    Chloride mmol/L 96  --    CO2 mmol/L 23  --    Glucose mg/dL 97  --    Glucose, UA   --  Negative   BUN mg/dL 31*  --    Creatinine mg/dL <0.3*  --    Albumin g/dL 3.5  --    Total Bilirubin mg/dL 0.9  --    Alkaline Phosphatase U/L 198*  --    AST U/L 39  --    ALT U/L 69*  --    Magnesium mg/dL 1.9  --        Drug levels (last 3 results):  No results for input(s): VANCOMYCINRA, VANCORANDOM, VANCOMYCINPE, VANCOPEAK, VANCOMYCINTR, VANCOTROUGH in the last 72 hours.    Microbiologic Results:  Microbiology Results (last 7 days)       Procedure  Component Value Units Date/Time    Blood culture #2 **CANNOT BE ORDERED STAT** [[id number]] Collected: 07/29/22 0345    Order Status: Completed Specimen: Blood from Peripheral, Forearm, Right Updated: 07/29/22 1117     Blood Culture, Routine No Growth to date    Blood culture #1 **CANNOT BE ORDERED STAT** [[id number]] Collected: 07/29/22 0320    Order Status: Completed Specimen: Blood from Peripheral, Hand, Right Updated: 07/29/22 1117     Blood Culture, Routine No Growth to date    Urine Culture High Risk [[id number]]     Order Status: Completed Specimen: Urine, Catheterized     Urine Culture High Risk [[id number]]     Order Status: Canceled Specimen: Urine, Catheterized     Urine culture [[id number]] Collected: 07/29/22 0545    Order Status: No result Specimen: Urine Updated: 07/29/22 0701    Culture, Respiratory with Gram Stain [[id number]] Collected: 07/29/22 0306    Order Status: Sent Specimen: Respiratory from Tracheal Aspirate Updated: 07/29/22 0320

## 2022-07-30 LAB
ALBUMIN SERPL BCP-MCNC: 2.8 G/DL (ref 3.5–5.2)
ALP SERPL-CCNC: 179 U/L (ref 55–135)
ALT SERPL W/O P-5'-P-CCNC: 50 U/L (ref 10–44)
ANION GAP SERPL CALC-SCNC: 8 MMOL/L (ref 8–16)
AST SERPL-CCNC: 24 U/L (ref 10–40)
BACTERIA UR CULT: NORMAL
BACTERIA UR CULT: NORMAL
BILIRUB SERPL-MCNC: 0.9 MG/DL (ref 0.1–1)
BUN SERPL-MCNC: 21 MG/DL (ref 6–20)
CALCIUM SERPL-MCNC: 8.4 MG/DL (ref 8.7–10.5)
CHLORIDE SERPL-SCNC: 103 MMOL/L (ref 95–110)
CO2 SERPL-SCNC: 24 MMOL/L (ref 23–29)
CREAT SERPL-MCNC: <0.3 MG/DL (ref 0.5–1.4)
ERYTHROCYTE [DISTWIDTH] IN BLOOD BY AUTOMATED COUNT: 18.5 % (ref 11.5–14.5)
EST. GFR  (AFRICAN AMERICAN): >60 ML/MIN/1.73 M^2
EST. GFR  (NON AFRICAN AMERICAN): >60 ML/MIN/1.73 M^2
GLUCOSE SERPL-MCNC: 119 MG/DL (ref 70–110)
HCT VFR BLD AUTO: 28.7 % (ref 37–48.5)
HGB BLD-MCNC: 8.7 G/DL (ref 12–16)
MCH RBC QN AUTO: 26.3 PG (ref 27–31)
MCHC RBC AUTO-ENTMCNC: 30.3 G/DL (ref 32–36)
MCV RBC AUTO: 87 FL (ref 82–98)
PLATELET # BLD AUTO: 217 K/UL (ref 150–450)
PMV BLD AUTO: 11.5 FL (ref 9.2–12.9)
POTASSIUM SERPL-SCNC: 3.6 MMOL/L (ref 3.5–5.1)
PROCALCITONIN SERPL IA-MCNC: 0.35 NG/ML (ref 0–0.5)
PROT SERPL-MCNC: 7.7 G/DL (ref 6–8.4)
RBC # BLD AUTO: 3.31 M/UL (ref 4–5.4)
SODIUM SERPL-SCNC: 135 MMOL/L (ref 136–145)
VANCOMYCIN TROUGH SERPL-MCNC: 30.5 UG/ML
WBC # BLD AUTO: 9.69 K/UL (ref 3.9–12.7)

## 2022-07-30 PROCEDURE — 27000221 HC OXYGEN, UP TO 24 HOURS

## 2022-07-30 PROCEDURE — 99900031 HC PATIENT EDUCATION (STAT)

## 2022-07-30 PROCEDURE — 99232 SBSQ HOSP IP/OBS MODERATE 35: CPT | Mod: ,,, | Performed by: INTERNAL MEDICINE

## 2022-07-30 PROCEDURE — 80053 COMPREHEN METABOLIC PANEL: CPT | Performed by: INTERNAL MEDICINE

## 2022-07-30 PROCEDURE — 99900035 HC TECH TIME PER 15 MIN (STAT)

## 2022-07-30 PROCEDURE — 94640 AIRWAY INHALATION TREATMENT: CPT

## 2022-07-30 PROCEDURE — 94761 N-INVAS EAR/PLS OXIMETRY MLT: CPT

## 2022-07-30 PROCEDURE — 20000000 HC ICU ROOM

## 2022-07-30 PROCEDURE — 63600175 PHARM REV CODE 636 W HCPCS: Performed by: INTERNAL MEDICINE

## 2022-07-30 PROCEDURE — 80202 ASSAY OF VANCOMYCIN: CPT | Performed by: INTERNAL MEDICINE

## 2022-07-30 PROCEDURE — 84145 PROCALCITONIN (PCT): CPT | Performed by: INTERNAL MEDICINE

## 2022-07-30 PROCEDURE — 25000242 PHARM REV CODE 250 ALT 637 W/ HCPCS: Performed by: INTERNAL MEDICINE

## 2022-07-30 PROCEDURE — 99900026 HC AIRWAY MAINTENANCE (STAT)

## 2022-07-30 PROCEDURE — 25000003 PHARM REV CODE 250: Performed by: INTERNAL MEDICINE

## 2022-07-30 PROCEDURE — 94003 VENT MGMT INPAT SUBQ DAY: CPT

## 2022-07-30 PROCEDURE — 85027 COMPLETE CBC AUTOMATED: CPT | Performed by: INTERNAL MEDICINE

## 2022-07-30 PROCEDURE — 99232 PR SUBSEQUENT HOSPITAL CARE,LEVL II: ICD-10-PCS | Mod: ,,, | Performed by: INTERNAL MEDICINE

## 2022-07-30 RX ORDER — SODIUM,POTASSIUM PHOSPHATES 280-250MG
2 POWDER IN PACKET (EA) ORAL
Status: DISCONTINUED | OUTPATIENT
Start: 2022-07-30 | End: 2022-08-05 | Stop reason: HOSPADM

## 2022-07-30 RX ORDER — IPRATROPIUM BROMIDE AND ALBUTEROL SULFATE 2.5; .5 MG/3ML; MG/3ML
3 SOLUTION RESPIRATORY (INHALATION) EVERY 6 HOURS
Status: DISCONTINUED | OUTPATIENT
Start: 2022-07-30 | End: 2022-08-02

## 2022-07-30 RX ORDER — LANOLIN ALCOHOL/MO/W.PET/CERES
800 CREAM (GRAM) TOPICAL
Status: DISCONTINUED | OUTPATIENT
Start: 2022-07-30 | End: 2022-08-05 | Stop reason: HOSPADM

## 2022-07-30 RX ORDER — ACETYLCYSTEINE 200 MG/ML
4 SOLUTION ORAL; RESPIRATORY (INHALATION) 3 TIMES DAILY
Status: DISCONTINUED | OUTPATIENT
Start: 2022-07-30 | End: 2022-07-30

## 2022-07-30 RX ORDER — IPRATROPIUM BROMIDE AND ALBUTEROL SULFATE 2.5; .5 MG/3ML; MG/3ML
3 SOLUTION RESPIRATORY (INHALATION)
Status: DISCONTINUED | OUTPATIENT
Start: 2022-07-30 | End: 2022-07-30

## 2022-07-30 RX ORDER — ACETYLCYSTEINE 200 MG/ML
4 SOLUTION ORAL; RESPIRATORY (INHALATION) EVERY 6 HOURS
Status: DISCONTINUED | OUTPATIENT
Start: 2022-07-30 | End: 2022-08-02

## 2022-07-30 RX ADMIN — MEROPENEM AND SODIUM CHLORIDE 1 G: 1 INJECTION, SOLUTION INTRAVENOUS at 10:07

## 2022-07-30 RX ADMIN — VANCOMYCIN HYDROCHLORIDE 1250 MG: 1.25 INJECTION, POWDER, LYOPHILIZED, FOR SOLUTION INTRAVENOUS at 04:07

## 2022-07-30 RX ADMIN — ACETYLCYSTEINE 4 ML: 200 SOLUTION ORAL; RESPIRATORY (INHALATION) at 01:07

## 2022-07-30 RX ADMIN — FAMOTIDINE 20 MG: 20 TABLET ORAL at 09:07

## 2022-07-30 RX ADMIN — ACETYLCYSTEINE 4 ML: 200 SOLUTION ORAL; RESPIRATORY (INHALATION) at 07:07

## 2022-07-30 RX ADMIN — MIDODRINE HYDROCHLORIDE 10 MG: 2.5 TABLET ORAL at 09:07

## 2022-07-30 RX ADMIN — OXYBUTYNIN CHLORIDE 5 MG: 5 TABLET ORAL at 09:07

## 2022-07-30 RX ADMIN — MEROPENEM AND SODIUM CHLORIDE 1 G: 1 INJECTION, SOLUTION INTRAVENOUS at 08:07

## 2022-07-30 RX ADMIN — IPRATROPIUM BROMIDE AND ALBUTEROL SULFATE 3 ML: .5; 3 SOLUTION RESPIRATORY (INHALATION) at 12:07

## 2022-07-30 RX ADMIN — MIDODRINE HYDROCHLORIDE 10 MG: 2.5 TABLET ORAL at 03:07

## 2022-07-30 RX ADMIN — SODIUM CHLORIDE: 0.9 INJECTION, SOLUTION INTRAVENOUS at 10:07

## 2022-07-30 RX ADMIN — IPRATROPIUM BROMIDE AND ALBUTEROL SULFATE 3 ML: .5; 3 SOLUTION RESPIRATORY (INHALATION) at 07:07

## 2022-07-30 RX ADMIN — NOREPINEPHRINE BITARTRATE 0.05 MCG/KG/MIN: 4 INJECTION, SOLUTION INTRAVENOUS at 03:07

## 2022-07-30 RX ADMIN — ACETAMINOPHEN 650 MG: 325 TABLET ORAL at 03:07

## 2022-07-30 RX ADMIN — ACETYLCYSTEINE 4 ML: 200 SOLUTION ORAL; RESPIRATORY (INHALATION) at 12:07

## 2022-07-30 RX ADMIN — IPRATROPIUM BROMIDE AND ALBUTEROL SULFATE 3 ML: .5; 3 SOLUTION RESPIRATORY (INHALATION) at 01:07

## 2022-07-30 RX ADMIN — MEROPENEM AND SODIUM CHLORIDE 1 G: 1 INJECTION, SOLUTION INTRAVENOUS at 03:07

## 2022-07-30 RX ADMIN — ENOXAPARIN SODIUM 40 MG: 40 INJECTION SUBCUTANEOUS at 09:07

## 2022-07-30 RX ADMIN — POTASSIUM BICARBONATE 50 MEQ: 977.5 TABLET, EFFERVESCENT ORAL at 08:07

## 2022-07-30 NOTE — CARE UPDATE
07/29/22 1936   Patient Assessment/Suction   Level of Consciousness (AVPU) responds to pain   Respiratory Effort Normal;Unlabored   Expansion/Accessory Muscles/Retractions no use of accessory muscles   All Lung Fields Breath Sounds Anterior:;coarse   Rhythm/Pattern, Respiratory assisted mechanically   Cough Frequency with stimulation   Cough Type assisted   Suction Method oral;tracheal   Suction Pressure (mmHg) -120 mmHg   $ Suction Charges Inline Suction Procedure Stat Charge   Secretions Amount large   Secretions Color yellow;creamy   Secretions Characteristics thick   Skin Integrity   $ Wound Care Tech Time 15 min   Area Observed Neck;Neck under tracheostomy   Skin Appearance without discoloration   PRE-TX-O2   O2 Device (Oxygen Therapy) ventilator   $ Is the patient on Low Flow Oxygen? Yes   Oxygen Concentration (%) 50   SpO2 99 %   Pulse Oximetry Type Continuous   $ Pulse Oximetry - Multiple Charge Pulse Oximetry - Multiple   Pulse 83   Resp 10   Aerosol Therapy   $ Aerosol Therapy Charges Aerosol Treatment   Daily Review of Necessity (SVN) completed   Respiratory Treatment Status (SVN) given   Treatment Route (SVN) in-line   Patient Position (SVN) HOB elevated   Post Treatment Assessment (SVN) breath sounds unchanged   Signs of Intolerance (SVN) none   Breath Sounds Post-Respiratory Treatment   Post-treatment Heart Rate (beats/min) 86   Vent Select   Conventional Vent Y   Charged w/in last 24h YES   Preset Conventional Ventilator Settings   Vent Type    Ventilation Type VC   Vent Mode A/C   Humidity HME   Set Rate 16 BPM   Vt Set 450 mL   PEEP/CPAP 5 cmH20   Peak Flow 60 L/min   Peak End Inspiratory Pressure 39 cmH20   I-Trigger Type  V-TRIG   Trigger Sensitivity Flow/I-Trigger 3 L/min   Patient Ventilator Parameters   Resp Rate Total 16 br/min   Peak Airway Pressure 40 cmH2O   Mean Airway Pressure 12 cmH20   Plateau Pressure 0 cmH20   Exhaled Vt 434 mL   Total Ve 7.32 mL   I:E Ratio Measured 1:3.60    Auto PEEP 0 cmH20   Tube Type Trach   Conventional Ventilator Alarms   Alarms On Y   Press High Alarm 50 cmH2O   Apnea Rate 10   Apnea Volume (mL) 0 mL   Apnea Oxygen Concentration  100   Apnea Flow Rate (L/min) 59   T Apnea 20 sec(s)   Ready to Wean/Extubation Screen   FIO2<=50 (chart decimal) 0.5   MV<16L (chart vol.) 7.32   PEEP <=8 (chart #) 5   Ready to Wean Parameters   F/VT Ratio<105 (RSBI) (!) 23.04   Vital Capacity (mL) 0   Education   $ Education Ventilator Oxygen;15 min   Respiratory Evaluation   $ Care Plan Tech Time 15 min

## 2022-07-30 NOTE — PROGRESS NOTES
[facility] Medicine  Progress Note    Patient Name: Genna Felix  MRN: [id number]  Patient Class: IP- Inpatient   Admission Date: 7/29/2022  Length of Stay: 1 days  Attending Physician: Figueroa Flores MD  Primary Care Provider: Primary Doctor No        Subjective:     Principal Problem:Aspiration pneumonia        HPI:  Pt got admitted from NH with aspiration pneumonia and Shock  Pt was Hypothermic in ER which resolved soon  Pt has PEG tube/Trach/Tube from gall bladder area/Urine Catheter  Family not unavailable in person or via Phone  CXR showed aspiration pneumonia and pt got admitted in ICU       Overview/Hospital Course:  07/30  Afebrile  Still on pressors  Condition better compared to yesterday      Interval History:     Review of Systems   Unable to perform ROS: Intubated   Objective:     Vital Signs (Most Recent):  Temp: 99.2 °F (37.3 °C) (07/30/22 1530)  Pulse: 97 (07/30/22 1757)  Resp: 19 (07/30/22 1757)  BP: 120/69 (07/30/22 1700)  SpO2: 100 % (07/30/22 1757) Vital Signs (24h Range):  Temp:  [96.2 °F (35.7 °C)-99.2 °F (37.3 °C)] 99.2 °F (37.3 °C)  Pulse:  [] 97  Resp:  [10-25] 19  SpO2:  [96 %-100 %] 100 %  BP: ()/(52-69) 120/69     Weight: 75.5 kg (166 lb 7.2 oz)  Body mass index is 32.51 kg/m².    Intake/Output Summary (Last 24 hours) at 7/30/2022 1839  Last data filed at 7/30/2022 1828  Gross per 24 hour   Intake 1243.11 ml   Output 2600 ml   Net -1356.89 ml      Physical Exam  Vitals and nursing note reviewed.   Constitutional:       General: She is not in acute distress.     Comments: Trach dependent /Vent Dependent    HENT:      Head: Atraumatic.      Right Ear: External ear normal.      Left Ear: External ear normal.      Nose: Nose normal.      Mouth/Throat:      Mouth: Mucous membranes are moist.   Eyes:      General: No scleral icterus.  Cardiovascular:      Rate and Rhythm: Normal rate.   Pulmonary:      Effort: Pulmonary effort is normal.   Abdominal:       Comments: PEG tube  Tube from RUQ area   Genitourinary:     Comments: [place]  Skin:     General: Skin is warm.   Neurological:      Comments: Trach dependent /Vent Dependent        Significant Labs: All pertinent labs within the past 24 hours have been reviewed.  CBC:   Recent Labs   Lab 07/29/22  0321 07/29/22 0337 07/30/22 0429   WBC  --  4.33 9.69   HGB  --  11.0* 8.7*   HCT 34* 36.2* 28.7*   PLT  --  185 217     CMP:   Recent Labs   Lab 07/29/22 0337 07/30/22 0429   * 135*   K 5.1 3.6   CL 96 103   CO2 23 24   GLU 97 119*   BUN 31* 21*   CREATININE <0.3* <0.3*   CALCIUM 8.8 8.4*   PROT 9.3* 7.7   ALBUMIN 3.5 2.8*   BILITOT 0.9 0.9   ALKPHOS 198* 179*   AST 39 24   ALT 69* 50*   ANIONGAP 9 8   EGFRNONAA >60.0 >60.0       Significant Imaging: I have reviewed all pertinent imaging results/findings within the past 24 hours.      Assessment/Plan:      * Aspiration pneumonia  Continue present iv abx to cover aspiration and hospital acquired pneumonia(Pt lives in NH and Trach/Vent dependent)      Shock, unspecified  Continue iv fluids/Pressors      Ventilator dependence  Stable  Chronic issue       UTI (urinary tract infection)  Catheter associated  Vs Contaminant  UC  Iv abx       Cirrhosis of liver  Aware       Pressure injury of left buttock, stage 4  Aware       Chronic respiratory failure with hypoxia  Trach/Vent dependent      PEG (percutaneous endoscopic gastrostomy) status  Stable  Chronic issue       Tracheostomy dependence  Stable  Chronic issue       Persistent vegetative state  Stable  Chronic issue       Anemia of chronic disease  Stable  Chronic issue         VTE Risk Mitigation (From admission, onward)         Ordered     enoxaparin injection 40 mg  Every 24 hours (non-standard times)         07/29/22 0806     IP VTE HIGH RISK PATIENT  Once         07/29/22 0805     Place sequential compression device  Until discontinued         07/29/22 0805                Discharge Planning   [place]:      Code  Status: Full Code   Is the patient medically ready for discharge?:     Reason for patient still in hospital (select all that apply): Treatment  Discharge Plan A: Return to nursing home                  Figueroa Flores MD  Department of Hospital Medicine   Critical access hospital

## 2022-07-30 NOTE — CARE UPDATE
07/30/22 0726   Patient Assessment/Suction   Level of Consciousness (AVPU) responds to pain   Respiratory Effort Unlabored;Normal   Expansion/Accessory Muscles/Retractions no use of accessory muscles   All Lung Fields Breath Sounds rhonchi   Rhythm/Pattern, Respiratory assisted mechanically   $ Suction Charges Inline Suction Procedure Stat Charge   Secretions Amount large   Secretions Color yellow   Secretions Characteristics thick   PRE-TX-O2   O2 Device (Oxygen Therapy) ventilator   $ Is the patient on Low Flow Oxygen? Yes   Oxygen Concentration (%) 50   SpO2 99 %   Pulse Oximetry Type Continuous   $ Pulse Oximetry - Multiple Charge Pulse Oximetry - Multiple   Pulse (!) 58   Resp 18   BP (!) 108/59   Aerosol Therapy   $ Aerosol Therapy Charges Aerosol Treatment   Daily Review of Necessity (SVN) completed   Respiratory Treatment Status (SVN) given   Treatment Route (SVN) in-line;tracheostomy;ventilator   Patient Position (SVN) HOB elevated   Post Treatment Assessment (SVN) breath sounds improved;vital signs unchanged   Signs of Intolerance (SVN) none        Surgical Airway Portex Cuffed   No placement date or time found.   Present Prior to Hospital Arrival?: Yes  Type: Tracheostomy  Brand: Portex  Airway Device Size: 8.0  Style: Cuffed   Cuff Pressure   (MLT)   Airway Safety   Ambu bag with the patient? Yes, Adult Ambu   Is mask with the patient? Yes, Adult Mask   Equipment Change   $ RT Equipment HME   Vent Select   Conventional Vent Y   $ Ventilator Subsequent 1   Charged w/in last 24h YES   Preset Conventional Ventilator Settings   Vent ID 7   Vent Type    Ventilation Type VC   Vent Mode A/C   Humidity HME   Set Rate 16 BPM   Vt Set 450 mL   PEEP/CPAP 5 cmH20   Peak Flow 60 L/min   Peak End Inspiratory Pressure 25 cmH20   I-Trigger Type  V-TRIG   Trigger Sensitivity Flow/I-Trigger 3 L/min   Patient Ventilator Parameters   Resp Rate Total 16 br/min   Peak Airway Pressure 34 cmH2O   Mean Airway Pressure  11 cmH20   Plateau Pressure 0 cmH20   Exhaled Vt 457 mL   Total Ve 7.34 mL   I:E Ratio Measured 1:3.60   Auto PEEP 0 cmH20   Tubing ID (mm) 8 mm  (portex)   Tube Type Trach   Conventional Ventilator Alarms   Alarms On Y   Ve High Alarm 20 L/min   Ve Low Alarm 3 L/min   Vt High Alarm 1200 mL   Vt Low Alarm 300 mL   Resp Rate High Alarm 40 br/min   Press High Alarm 50 cmH2O   Apnea Rate 16   Apnea Volume (mL) 450 mL   Apnea Oxygen Concentration  100   Apnea Flow Rate (L/min) 59   T Apnea 20 sec(s)   IHI Ventilator Associated Pneumonia Bundle (Required)   Head of Bed Elevated  HOB 30   Ready to Wean/Extubation Screen   FIO2<=50 (chart decimal) 0.5   MV<16L (chart vol.) 7.34   PEEP <=8 (chart #) 5   Ready to Wean Parameters   F/VT Ratio<105 (RSBI) (!) 39.39   Vital Capacity (mL) 0   Education   $ Education 15 min;Suction   Respiratory Evaluation   $ Care Plan Tech Time 15 min

## 2022-07-30 NOTE — PLAN OF CARE
[facility]  Initial Discharge Assessment       Primary Care Provider: Primary Doctor No    Admission Diagnosis: Pneumonia due to infectious organism, unspecified laterality, unspecified part of lung [J18.9]    Admission Date: 7/29/2022  Expected Discharge Date:     Discharge Barriers Identified: (P) None    Patient a resident of [facility] and will return when ready for discharge.    Payor: MEDICARE / Plan: MEDICARE PART A & B / Product Type: Government /     Extended Emergency Contact Information  Primary Emergency Contact: Amelia Hidalgo  Mobile Phone: [phone]  Relation: Sister  Preferred language: English   needed? No  Secondary Emergency Contact: Starr Felix  Mobile Phone: [phone]  Relation: Daughter    Discharge Plan A: (P) Return to nursing home  Discharge Plan B: (P) Return to Nursing Home      [facility]  [address] [address]  Phone: [phone] Fax: [phone]      Initial Assessment (most recent)       Adult Discharge Assessment - 07/30/22 1455          Discharge Assessment    Assessment Type Discharge Planning Assessment (P)      Confirmed/corrected address, phone number and insurance Yes (P)      Confirmed Demographics Correct on Facesheet (P)      Source of Information family (P)      When was your last doctors appointment? -- (P)    last month    Communicated [place] with patient/caregiver Date not available/Unable to determine (P)      Reason For Admission PN (P)      Lives With facility resident (P)      Facility Arrived From: [facility] (P)      Prior to hospitilization cognitive status: Coma/Sedated/Intubated (P)      Current cognitive status: Coma/Sedated/Intubated (P)      Walking or Climbing Stairs Difficulty ambulation difficulty, dependent (P)      Mobility Management bed bound (P)      Dressing/Bathing Difficulty bathing difficulty, dependent (P)      Dressing/Bathing Management bed bound (P)       Home Accessibility wheelchair accessible (P)      Equipment Currently Used at Home ventilator;hospital bed (P)      Readmission within 30 days? No (P)      Patient currently being followed by outpatient case management? No (P)      Do you currently have service(s) that help you manage your care at home? No (P)      Do you take prescription medications? Yes (P)      Do you have prescription coverage? Yes (P)      Coverage medicaid (P)      Do you have any problems affording any of your prescribed medications? No (P)      Is the patient taking medications as prescribed? yes (P)      Who is going to help you get home at discharge? ambulance, bedbound, trach (P)      How do you get to doctors appointments? other (see comments) (P)    ambulance    Are you on dialysis? No (P)      Do you take coumadin? No (P)      Discharge Plan A Return to nursing home (P)      Discharge Plan B Return to Nursing Home (P)      DME Needed Upon Discharge  none (P)      Discharge Plan discussed with: Sibling (P)      Name(s) and Number(s) Amelia Rodriguezmichelleerin [phone] (P)      Discharge Barriers Identified None (P)

## 2022-07-30 NOTE — PLAN OF CARE
Problem: Infection  Goal: Absence of Infection Signs and Symptoms  Outcome: Ongoing, Progressing     Problem: Adult Inpatient Plan of Care  Goal: Plan of Care Review  Outcome: Ongoing, Progressing  Goal: Patient-Specific Goal (Individualized)  Outcome: Ongoing, Progressing  Goal: Absence of Hospital-Acquired Illness or Injury  Outcome: Ongoing, Progressing  Goal: Optimal Comfort and Wellbeing  Outcome: Ongoing, Progressing  Goal: Readiness for Transition of Care  Outcome: Ongoing, Progressing     Problem: Impaired Wound Healing  Goal: Optimal Wound Healing  Outcome: Ongoing, Progressing     Problem: Communication Impairment (Mechanical Ventilation, Invasive)  Goal: Effective Communication  Outcome: Ongoing, Progressing     Problem: Device-Related Complication Risk (Mechanical Ventilation, Invasive)  Goal: Optimal Device Function  Outcome: Ongoing, Progressing     Problem: Inability to Wean (Mechanical Ventilation, Invasive)  Goal: Mechanical Ventilation Liberation  Outcome: Ongoing, Progressing     Problem: Nutrition Impairment (Mechanical Ventilation, Invasive)  Goal: Optimal Nutrition Delivery  Outcome: Ongoing, Progressing     Problem: Skin and Tissue Injury (Mechanical Ventilation, Invasive)  Goal: Absence of Device-Related Skin and Tissue Injury  Outcome: Ongoing, Progressing     Problem: Ventilator-Induced Lung Injury (Mechanical Ventilation, Invasive)  Goal: Absence of Ventilator-Induced Lung Injury  Outcome: Ongoing, Progressing     Problem: Communication Impairment (Artificial Airway)  Goal: Effective Communication  Outcome: Ongoing, Progressing     Problem: Device-Related Complication Risk (Artificial Airway)  Goal: Optimal Device Function  Outcome: Ongoing, Progressing     Problem: Skin and Tissue Injury (Artificial Airway)  Goal: Absence of Device-Related Skin or Tissue Injury  Outcome: Ongoing, Progressing     Problem: Noninvasive Ventilation Acute  Goal: Effective Unassisted Ventilation and  Oxygenation  Outcome: Ongoing, Progressing     Problem: Skin Injury Risk Increased  Goal: Skin Health and Integrity  Outcome: Ongoing, Progressing     Problem: Aspiration (Enteral Nutrition)  Goal: Absence of Aspiration Signs and Symptoms  Outcome: Ongoing, Progressing     Problem: Device-Related Complication Risk (Enteral Nutrition)  Goal: Safe, Effective Therapy Delivery  Outcome: Ongoing, Progressing     Problem: Feeding Intolerance (Enteral Nutrition)  Goal: Feeding Tolerance  Outcome: Ongoing, Progressing

## 2022-07-30 NOTE — SUBJECTIVE & OBJECTIVE
Interval History:     Review of Systems   Unable to perform ROS: Intubated   Objective:     Vital Signs (Most Recent):  Temp: 99.2 °F (37.3 °C) (07/30/22 1530)  Pulse: 97 (07/30/22 1757)  Resp: 19 (07/30/22 1757)  BP: 120/69 (07/30/22 1700)  SpO2: 100 % (07/30/22 1757) Vital Signs (24h Range):  Temp:  [96.2 °F (35.7 °C)-99.2 °F (37.3 °C)] 99.2 °F (37.3 °C)  Pulse:  [] 97  Resp:  [10-25] 19  SpO2:  [96 %-100 %] 100 %  BP: ()/(52-69) 120/69     Weight: 75.5 kg (166 lb 7.2 oz)  Body mass index is 32.51 kg/m².    Intake/Output Summary (Last 24 hours) at 7/30/2022 1839  Last data filed at 7/30/2022 1828  Gross per 24 hour   Intake 1243.11 ml   Output 2600 ml   Net -1356.89 ml      Physical Exam  Vitals and nursing note reviewed.   Constitutional:       General: She is not in acute distress.     Comments: Trach dependent /Vent Dependent    HENT:      Head: Atraumatic.      Right Ear: External ear normal.      Left Ear: External ear normal.      Nose: Nose normal.      Mouth/Throat:      Mouth: Mucous membranes are moist.   Eyes:      General: No scleral icterus.  Cardiovascular:      Rate and Rhythm: Normal rate.   Pulmonary:      Effort: Pulmonary effort is normal.   Abdominal:      Comments: PEG tube  Tube from RUQ area   Genitourinary:     Comments: [place]  Skin:     General: Skin is warm.   Neurological:      Comments: Trach dependent /Vent Dependent        Significant Labs: All pertinent labs within the past 24 hours have been reviewed.  CBC:   Recent Labs   Lab 07/29/22  0321 07/29/22 0337 07/30/22 0429   WBC  --  4.33 9.69   HGB  --  11.0* 8.7*   HCT 34* 36.2* 28.7*   PLT  --  185 217     CMP:   Recent Labs   Lab 07/29/22 0337 07/30/22 0429   * 135*   K 5.1 3.6   CL 96 103   CO2 23 24   GLU 97 119*   BUN 31* 21*   CREATININE <0.3* <0.3*   CALCIUM 8.8 8.4*   PROT 9.3* 7.7   ALBUMIN 3.5 2.8*   BILITOT 0.9 0.9   ALKPHOS 198* 179*   AST 39 24   ALT 69* 50*   ANIONGAP 9 8   EGFRNONAA >60.0 >60.0        Significant Imaging: I have reviewed all pertinent imaging results/findings within the past 24 hours.

## 2022-07-30 NOTE — HOSPITAL COURSE
07/30  Afebrile  Still on pressors  Condition better compared to yesterday    07/31  Started on Zerbaxa and Cipro  On pressors  Overall poor condition persists  Still unable to get in touch with family     08/01  Slight improvement overall  Pressors stopped around noon  BP levels stable

## 2022-07-30 NOTE — PROGRESS NOTES
Progress Note  PULMONARY    Admit Date: 7/29/2022 07/30/2022 7/29/2022 - 62 yo with h/o GERD, HTN, PE, trach, vent, anoxic brain injury sent to ER from [place] for coughing and possible aspiration.  In ER was hypothermic, procalcitonin was low, UA showed UTI.  CT chest reviewed and shows chronic changes, a nodule and some inflammatory changes.  Has QTc prolongation.  I ICU was hypotensive despite fluid bolus and pressors are being started.  I d./w her sister and updated her and attempted to call her daughter to discuss code status but no answer.    Plan     · Continue vent support - vent dependent  · IVF, pressors as needed  · Broad spectrum antibiotics band follow up cultures  · Contact isolation  · Nebs and mucomyst to help with secretions  · Try to discuss with family about code status  · Follow Na, follow H/H (no acute bleeding reported)     Thank you for this consult.  I will follow with you while the patient is hospitalized.       SUBJECTIVE:     7/30 no c/o trach and anoxic      PFSH and Allergies reviewed.    OBJECTIVE:     Vitals (Most recent):  Vitals:    07/30/22 1354   BP:    Pulse: 89   Resp: 16   Temp:        Vitals (24 hour range):  Temp:  [96.2 °F (35.7 °C)-96.9 °F (36.1 °C)]   Pulse:  []   Resp:  [10-25]   BP: ()/(43-68)   SpO2:  [96 %-100 %]       Intake/Output Summary (Last 24 hours) at 7/30/2022 1437  Last data filed at 7/30/2022 0600  Gross per 24 hour   Intake 1058.59 ml   Output 1590 ml   Net -531.41 ml          Physical Exam:  The patient's neuro status (alertness,orientation,cognitive function,motor skills,), pharyngeal exam (oral lesions, hygiene, abn dentition,), Neck (jvd,mass,thyroid,nodes in neck and above/below clavicle),RESPIRATORY(symmetry,effort,fremitus,percussion,auscultation),  Cor(rhythm,heart tones including gallops,perfusion,edema)ABD(distention,hepatic&splenomegaly,tenderness,masses), Skin(rash,cyanosis),Psyc(affect,judgement,).  Exam negative except for  these pertinent findings:    Trach,contractured, min rhonchi, no distress    Radiographs reviewed: view by direct vision    Results for orders placed during the hospital encounter of 07/29/22    X-Ray Chest 1 View    Narrative  HISTORY: infiltrate    FINDINGS: Portable chest radiograph at 0606 hours compared to 07/29/2022 shows tracheostomy cannula and right subclavian central venous catheter, unchanged in position, with the cardiomediastinal silhouette and pulmonary vasculature stable and within normal limits.    The lungs are symmetrically expanded, with persistent faint right lower lung airspace opacities, nonspecific. No large pleural effusion or pneumothorax.    IMPRESSION: Unchanged right lower lung airspace opacities, suggesting atelectasis or infiltrate.    Electronically signed by:  Nigel Rolon MD  7/30/2022 7:14 AM CDT Workstation: [id number]  ]    Labs     Recent Labs   Lab 07/30/22 0429   WBC 9.69   HGB 8.7*   HCT 28.7*        Recent Labs   Lab 07/30/22 0429   *   K 3.6      CO2 24   BUN 21*   CREATININE <0.3*   *   CALCIUM 8.4*   AST 24   ALT 50*   ALKPHOS 179*   BILITOT 0.9   PROT 7.7   ALBUMIN 2.8*   PROCAL 0.35   No results for input(s): PH, PCO2, PO2, HCO3 in the last 24 hours.  Microbiology Results (last 7 days)     Procedure Component Value Units Date/Time    Culture, Respiratory with Gram Stain [[id number]]  (Abnormal) Collected: 07/29/22 0306    Order Status: Completed Specimen: Respiratory from Tracheal Aspirate Updated: 07/30/22 0838     Respiratory Culture GRAM NEGATIVE CHRISTINA, NON-LACTOSE   Moderate  Identification and susceptibility pending        PRESUMPTIVE PROTEUS SPECIES  Identification and susceptibility pending       Gram Stain (Respiratory) Many WBC's     Gram Stain (Respiratory) <10 epithelial cells per low power field.     Gram Stain (Respiratory) Few Gram negative rods    Urine culture [[id number]] Collected: 07/29/22 0547    Order Status:  Completed Specimen: Urine Updated: 07/30/22 0751     Urine Culture, Routine Multiple organisms isolated. None in predominance.  Repeat if      clinically necessary.    Narrative:      Specimen Source->Urine    Blood culture #1 **CANNOT BE ORDERED STAT** [[id number]] Collected: 07/29/22 0320    Order Status: Completed Specimen: Blood from Peripheral, Hand, Right Updated: 07/30/22 0432     Blood Culture, Routine No Growth to date      No Growth to date    Blood culture #2 **CANNOT BE ORDERED STAT** [[id number]] Collected: 07/29/22 0345    Order Status: Completed Specimen: Blood from Peripheral, Forearm, Right Updated: 07/30/22 0432     Blood Culture, Routine No Growth to date      No Growth to date    Urine Culture High Risk [[id number]]     Order Status: Completed Specimen: Urine, Catheterized     Urine Culture High Risk [[id number]]     Order Status: Canceled Specimen: Urine, Catheterized           Impression:  Active Hospital Problems    Diagnosis  POA    *Aspiration pneumonia [J69.0]  Unknown    Ventilator dependence [Z99.11]  Not Applicable    Shock, unspecified [R57.9]  Unknown    Cirrhosis of liver [K74.60]  Yes    UTI (urinary tract infection) [N39.0]  Unknown    Chronic respiratory failure with hypoxia [J96.11]  Yes    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  Added automatically from request for surgery [id number]        Resolved Hospital Problems   No resolved problems to display.               Plan:   7/30 no fever, vss, merrem/vanc  Norepi,   40% ox, procal 0.35 today    cxr patchy infiltrate right .> left  Had serratia and providentia in sputum March -- culture pending this admit    Pt stable - Dr Flores to f/u  monday                          .

## 2022-07-30 NOTE — PROGRESS NOTES
Pharmacokinetic Assessment Follow Up: IV Vancomycin    Patient brief summary:  Genna Felix is a 61 y.o. female initiated on antimicrobial therapy with IV Vancomycin for treatment of Pneumonia and Urinary Tract Infection    The patient's current regimen is Vancomycin 1250mg Q12      Actual Body Weight = 75.5 kg (166 lb 7.2 oz).    Renal Function:   CrCl cannot be calculated (This lab value cannot be used to calculate CrCl because it is not a number: <0.3).      Vancomycin serum concentration assessment(s):    The trough level was drawn correctly and can be used to guide therapy at this time. The measurement is above the desired definitive target range of 10 to 15 mcg/mL.    Vancomycin Regimen Plan:    Re-dose when the random level is less than 15 mcg/mL, next level to be drawn at 07/31 on AM labs    Drug levels (last 3 results):  Recent Labs   Lab Result Units 07/30/22  1546   Vancomycin-Trough ug/mL 30.5*       Pharmacy will continue to follow and monitor vancomycin.    Please contact pharmacy at extension [phone] for questions regarding this assessment.    Thank you for the consult,   Tawanna Pierce

## 2022-07-31 LAB
ALBUMIN SERPL BCP-MCNC: 2.8 G/DL (ref 3.5–5.2)
ALP SERPL-CCNC: 187 U/L (ref 55–135)
ALT SERPL W/O P-5'-P-CCNC: 49 U/L (ref 10–44)
ANION GAP SERPL CALC-SCNC: 8 MMOL/L (ref 8–16)
AST SERPL-CCNC: 31 U/L (ref 10–40)
BILIRUB SERPL-MCNC: 0.8 MG/DL (ref 0.1–1)
BILIRUB UR QL STRIP: NEGATIVE
BUN SERPL-MCNC: 12 MG/DL (ref 6–20)
CALCIUM SERPL-MCNC: 8.6 MG/DL (ref 8.7–10.5)
CHLORIDE SERPL-SCNC: 105 MMOL/L (ref 95–110)
CLARITY UR: CLEAR
CO2 SERPL-SCNC: 26 MMOL/L (ref 23–29)
COLOR UR: YELLOW
CREAT SERPL-MCNC: <0.3 MG/DL (ref 0.5–1.4)
ERYTHROCYTE [DISTWIDTH] IN BLOOD BY AUTOMATED COUNT: 18.6 % (ref 11.5–14.5)
EST. GFR  (AFRICAN AMERICAN): >60 ML/MIN/1.73 M^2
EST. GFR  (NON AFRICAN AMERICAN): >60 ML/MIN/1.73 M^2
GLUCOSE SERPL-MCNC: 106 MG/DL (ref 70–110)
GLUCOSE UR QL STRIP: NEGATIVE
HCT VFR BLD AUTO: 28.3 % (ref 37–48.5)
HGB BLD-MCNC: 8.6 G/DL (ref 12–16)
HGB UR QL STRIP: NEGATIVE
KETONES UR QL STRIP: NEGATIVE
LEUKOCYTE ESTERASE UR QL STRIP: NEGATIVE
MCH RBC QN AUTO: 26.6 PG (ref 27–31)
MCHC RBC AUTO-ENTMCNC: 30.4 G/DL (ref 32–36)
MCV RBC AUTO: 88 FL (ref 82–98)
NITRITE UR QL STRIP: NEGATIVE
PH UR STRIP: 8 [PH] (ref 5–8)
PLATELET # BLD AUTO: 228 K/UL (ref 150–450)
PMV BLD AUTO: 11.8 FL (ref 9.2–12.9)
POTASSIUM SERPL-SCNC: 3.7 MMOL/L (ref 3.5–5.1)
PROT SERPL-MCNC: 7.7 G/DL (ref 6–8.4)
PROT UR QL STRIP: NEGATIVE
RBC # BLD AUTO: 3.23 M/UL (ref 4–5.4)
SODIUM SERPL-SCNC: 139 MMOL/L (ref 136–145)
SP GR UR STRIP: 1.01 (ref 1–1.03)
URN SPEC COLLECT METH UR: NORMAL
UROBILINOGEN UR STRIP-ACNC: NEGATIVE EU/DL
VANCOMYCIN SERPL-MCNC: 15.3 UG/ML
WBC # BLD AUTO: 6.05 K/UL (ref 3.9–12.7)

## 2022-07-31 PROCEDURE — 25000242 PHARM REV CODE 250 ALT 637 W/ HCPCS: Performed by: INTERNAL MEDICINE

## 2022-07-31 PROCEDURE — 94799 UNLISTED PULMONARY SVC/PX: CPT

## 2022-07-31 PROCEDURE — 25000242 PHARM REV CODE 250 ALT 637 W/ HCPCS

## 2022-07-31 PROCEDURE — 25000003 PHARM REV CODE 250: Performed by: STUDENT IN AN ORGANIZED HEALTH CARE EDUCATION/TRAINING PROGRAM

## 2022-07-31 PROCEDURE — 99233 SBSQ HOSP IP/OBS HIGH 50: CPT | Mod: ,,, | Performed by: INTERNAL MEDICINE

## 2022-07-31 PROCEDURE — 85027 COMPLETE CBC AUTOMATED: CPT | Performed by: INTERNAL MEDICINE

## 2022-07-31 PROCEDURE — 99291 PR CRITICAL CARE, E/M 30-74 MINUTES: ICD-10-PCS | Mod: ,,, | Performed by: STUDENT IN AN ORGANIZED HEALTH CARE EDUCATION/TRAINING PROGRAM

## 2022-07-31 PROCEDURE — 20000000 HC ICU ROOM

## 2022-07-31 PROCEDURE — 27000221 HC OXYGEN, UP TO 24 HOURS

## 2022-07-31 PROCEDURE — 94003 VENT MGMT INPAT SUBQ DAY: CPT

## 2022-07-31 PROCEDURE — 25000003 PHARM REV CODE 250: Performed by: INTERNAL MEDICINE

## 2022-07-31 PROCEDURE — 63600175 PHARM REV CODE 636 W HCPCS: Performed by: STUDENT IN AN ORGANIZED HEALTH CARE EDUCATION/TRAINING PROGRAM

## 2022-07-31 PROCEDURE — 99233 PR SUBSEQUENT HOSPITAL CARE,LEVL III: ICD-10-PCS | Mod: ,,, | Performed by: INTERNAL MEDICINE

## 2022-07-31 PROCEDURE — 94761 N-INVAS EAR/PLS OXIMETRY MLT: CPT

## 2022-07-31 PROCEDURE — 99900026 HC AIRWAY MAINTENANCE (STAT)

## 2022-07-31 PROCEDURE — 99900035 HC TECH TIME PER 15 MIN (STAT)

## 2022-07-31 PROCEDURE — 81003 URINALYSIS AUTO W/O SCOPE: CPT | Performed by: STUDENT IN AN ORGANIZED HEALTH CARE EDUCATION/TRAINING PROGRAM

## 2022-07-31 PROCEDURE — 80202 ASSAY OF VANCOMYCIN: CPT | Performed by: INTERNAL MEDICINE

## 2022-07-31 PROCEDURE — 63600175 PHARM REV CODE 636 W HCPCS: Performed by: INTERNAL MEDICINE

## 2022-07-31 PROCEDURE — 80053 COMPREHEN METABOLIC PANEL: CPT | Performed by: INTERNAL MEDICINE

## 2022-07-31 PROCEDURE — 99291 CRITICAL CARE FIRST HOUR: CPT | Mod: ,,, | Performed by: STUDENT IN AN ORGANIZED HEALTH CARE EDUCATION/TRAINING PROGRAM

## 2022-07-31 PROCEDURE — 94640 AIRWAY INHALATION TREATMENT: CPT

## 2022-07-31 PROCEDURE — 99900031 HC PATIENT EDUCATION (STAT)

## 2022-07-31 RX ORDER — CARBOXYMETHYLCELLULOSE SODIUM 5 MG/ML
1 SOLUTION/ DROPS OPHTHALMIC 4 TIMES DAILY
Status: DISCONTINUED | OUTPATIENT
Start: 2022-07-31 | End: 2022-08-05 | Stop reason: HOSPADM

## 2022-07-31 RX ORDER — CIPROFLOXACIN 2 MG/ML
400 INJECTION, SOLUTION INTRAVENOUS
Status: DISCONTINUED | OUTPATIENT
Start: 2022-07-31 | End: 2022-08-05 | Stop reason: HOSPADM

## 2022-07-31 RX ADMIN — CIPROFLOXACIN 400 MG: 2 INJECTION, SOLUTION INTRAVENOUS at 03:07

## 2022-07-31 RX ADMIN — ENOXAPARIN SODIUM 40 MG: 40 INJECTION SUBCUTANEOUS at 09:07

## 2022-07-31 RX ADMIN — MIDODRINE HYDROCHLORIDE 10 MG: 2.5 TABLET ORAL at 03:07

## 2022-07-31 RX ADMIN — FAMOTIDINE 20 MG: 20 TABLET ORAL at 09:07

## 2022-07-31 RX ADMIN — IPRATROPIUM BROMIDE AND ALBUTEROL SULFATE 3 ML: .5; 3 SOLUTION RESPIRATORY (INHALATION) at 12:07

## 2022-07-31 RX ADMIN — MEROPENEM AND SODIUM CHLORIDE 1 G: 1 INJECTION, SOLUTION INTRAVENOUS at 09:07

## 2022-07-31 RX ADMIN — CARBOXYMETHYLCELLULOSE SODIUM 1 DROP: 0.5 SOLUTION, GEL FORMING / DROPS OPHTHALMIC at 09:07

## 2022-07-31 RX ADMIN — CARBOXYMETHYLCELLULOSE SODIUM 1 DROP: 0.5 SOLUTION, GEL FORMING / DROPS OPHTHALMIC at 05:07

## 2022-07-31 RX ADMIN — IPRATROPIUM BROMIDE AND ALBUTEROL SULFATE 3 ML: .5; 3 SOLUTION RESPIRATORY (INHALATION) at 08:07

## 2022-07-31 RX ADMIN — OXYBUTYNIN CHLORIDE 5 MG: 5 TABLET ORAL at 09:07

## 2022-07-31 RX ADMIN — CEFTOLOZANE AND TAZOBACTAM 1500 MG: 1; .5 INJECTION, POWDER, LYOPHILIZED, FOR SOLUTION INTRAVENOUS at 02:07

## 2022-07-31 RX ADMIN — MIDODRINE HYDROCHLORIDE 10 MG: 2.5 TABLET ORAL at 09:07

## 2022-07-31 RX ADMIN — ACETYLCYSTEINE 4 ML: 200 SOLUTION ORAL; RESPIRATORY (INHALATION) at 12:07

## 2022-07-31 RX ADMIN — IPRATROPIUM BROMIDE AND ALBUTEROL SULFATE 3 ML: .5; 3 SOLUTION RESPIRATORY (INHALATION) at 01:07

## 2022-07-31 RX ADMIN — CEFTOLOZANE AND TAZOBACTAM 1500 MG: 1; .5 INJECTION, POWDER, LYOPHILIZED, FOR SOLUTION INTRAVENOUS at 11:07

## 2022-07-31 RX ADMIN — ACETYLCYSTEINE 4 ML: 200 SOLUTION ORAL; RESPIRATORY (INHALATION) at 07:07

## 2022-07-31 RX ADMIN — NOREPINEPHRINE BITARTRATE 0.04 MCG/KG/MIN: 4 INJECTION, SOLUTION INTRAVENOUS at 09:07

## 2022-07-31 RX ADMIN — IPRATROPIUM BROMIDE AND ALBUTEROL SULFATE 3 ML: .5; 3 SOLUTION RESPIRATORY (INHALATION) at 07:07

## 2022-07-31 RX ADMIN — SODIUM CHLORIDE: 0.9 INJECTION, SOLUTION INTRAVENOUS at 05:07

## 2022-07-31 RX ADMIN — CARBOXYMETHYLCELLULOSE SODIUM 1 DROP: 0.5 SOLUTION, GEL FORMING / DROPS OPHTHALMIC at 12:07

## 2022-07-31 RX ADMIN — ACETYLCYSTEINE 4 ML: 200 SOLUTION ORAL; RESPIRATORY (INHALATION) at 01:07

## 2022-07-31 NOTE — PROGRESS NOTES
[facility] Medicine  Progress Note    Patient Name: Genna Felix  MRN: [id number]  Patient Class: IP- Inpatient   Admission Date: 7/29/2022  Length of Stay: 2 days  Attending Physician: Figueroa Flores MD  Primary Care Provider: Primary Doctor No        Subjective:     Principal Problem:Aspiration pneumonia        HPI:  Pt got admitted from NH with aspiration pneumonia and Shock  Pt was Hypothermic in ER which resolved soon  Pt has PEG tube/Trach/Tube from gall bladder area/Urine Catheter  Family not unavailable in person or via Phone  CXR showed aspiration pneumonia and pt got admitted in ICU       Overview/Hospital Course:  07/30  Afebrile  Still on pressors  Condition better compared to yesterday    07/31  Started on Zerbaxa and Cipro  On pressors  Overall poor condition persists  Still unable to get in touch with family       Interval History:     Review of Systems   Unable to perform ROS: Other (Trach/Vent dependent)   Objective:     Vital Signs (Most Recent):  Temp: 96.2 °F (35.7 °C) (07/31/22 1530)  Pulse: 71 (07/31/22 1728)  Resp: 20 (07/31/22 1728)  BP: (!) 114/54 (07/31/22 1600)  SpO2: (!) 94 % (07/31/22 1728)   Vital Signs (24h Range):  Temp:  [96.2 °F (35.7 °C)-98.9 °F (37.2 °C)] 96.2 °F (35.7 °C)  Pulse:  [] 71  Resp:  [14-20] 20  SpO2:  [94 %-100 %] 94 %  BP: (100-154)/(51-82) 114/54     Weight: 75.5 kg (166 lb 7.2 oz)  Body mass index is 32.51 kg/m².    Intake/Output Summary (Last 24 hours) at 7/31/2022 1759  Last data filed at 7/31/2022 0600  Gross per 24 hour   Intake 1920.22 ml   Output 3400 ml   Net -1479.78 ml      Physical Exam  Vitals and nursing note reviewed.   Constitutional:       General: She is not in acute distress.  HENT:      Head: Atraumatic.      Right Ear: External ear normal.      Left Ear: External ear normal.      Nose: Nose normal.      Mouth/Throat:      Mouth: Mucous membranes are moist.   Neck:      Comments: Trach insitu  Cardiovascular:      Rate and  Rhythm: Normal rate.   Pulmonary:      Effort: Pulmonary effort is normal.      Comments: Vent Dependent   Abdominal:      Comments: PEG tube  Tube in RUQ area   Musculoskeletal:      Right lower leg: No edema.      Left lower leg: No edema.   Skin:     General: Skin is dry.   Neurological:      Comments: Trach/Vent dependent       Significant Labs: All pertinent labs within the past 24 hours have been reviewed.  CBC:   Recent Labs   Lab 07/30/22  0429 07/31/22  0407   WBC 9.69 6.05   HGB 8.7* 8.6*   HCT 28.7* 28.3*    228     CMP:   Recent Labs   Lab 07/30/22  0429 07/31/22  0407   * 139   K 3.6 3.7    105   CO2 24 26   * 106   BUN 21* 12   CREATININE <0.3* <0.3*   CALCIUM 8.4* 8.6*   PROT 7.7 7.7   ALBUMIN 2.8* 2.8*   BILITOT 0.9 0.8   ALKPHOS 179* 187*   AST 24 31   ALT 50* 49*   ANIONGAP 8 8   EGFRNONAA >60.0 >60.0       Significant Imaging: I have reviewed all pertinent imaging results/findings within the past 24 hours.      Assessment/Plan:      * Aspiration pneumonia  Continue present iv abx to cover aspiration and hospital acquired pneumonia(Pt lives in NH and Trach/Vent dependent  On  Zerbaxa and Cipro iv for ESBL proteus and Resistant ?Pseudomonas      Shock, unspecified  Continue iv fluids/Pressors      Ventilator dependence  Stable  Chronic issue       UTI (urinary tract infection)  Catheter associated  Vs Contaminant  Iv abx       Cirrhosis of liver  Aware       Pressure injury of left buttock, stage 4  Aware       Chronic respiratory failure with hypoxia  Trach/Vent dependent      PEG (percutaneous endoscopic gastrostomy) status  Stable  Chronic issue       Tracheostomy dependence  Stable  Chronic issue       Persistent vegetative state  Stable  Chronic issue       Anemia of chronic disease  Stable  Chronic issue         VTE Risk Mitigation (From admission, onward)         Ordered     enoxaparin injection 40 mg  Every 24 hours (non-standard times)         07/29/22 0806     IP  VTE HIGH RISK PATIENT  Once         07/29/22 0805     Place sequential compression device  Until discontinued         07/29/22 0805                Discharge Planning   [place]:      Code Status: Full Code   Is the patient medically ready for discharge?:     Reason for patient still in hospital (select all that apply): Treatment  Discharge Plan A: Return to nursing home                  Figueroa Flores MD  Department of Hospital Medicine   [facility]

## 2022-07-31 NOTE — CARE UPDATE
07/31/22 0725   Patient Assessment/Suction   Level of Consciousness (AVPU) alert  (eyes open)   Respiratory Effort Normal;Unlabored   All Lung Fields Breath Sounds wheezes, expiratory;rhonchi   Rhythm/Pattern, Respiratory assisted mechanically   $ Suction Charges Inline Suction Procedure Stat Charge   Secretions Amount large   Secretions Color yellow   Secretions Characteristics thick   PRE-TX-O2   O2 Device (Oxygen Therapy) ventilator   $ Is the patient on Low Flow Oxygen? Yes   Oxygen Concentration (%) 40   SpO2 99 %   Pulse Oximetry Type Continuous   $ Pulse Oximetry - Multiple Charge Pulse Oximetry - Multiple   Pulse (!) 50   Resp 16   Positioning HOB elevated 45 degrees   Aerosol Therapy   $ Aerosol Therapy Charges Aerosol Treatment   Daily Review of Necessity (SVN) completed   Respiratory Treatment Status (SVN) given   Treatment Route (SVN) in-line;ventilator   Patient Position (SVN) HOB elevated   Post Treatment Assessment (SVN) breath sounds improved;vital signs unchanged;congestion decreased   Signs of Intolerance (SVN) none   Airway Safety   Ambu bag with the patient? Yes, Adult Ambu   Is mask with the patient? Yes, Adult Mask   Extra trach at bedside? Yes   Extra trach sizes at bedside? 6   Vent Select   Conventional Vent Y   $ Ventilator Subsequent 1   Charged w/in last 24h YES   Preset Conventional Ventilator Settings   Vent ID [id number]   Vent Type    Ventilation Type VC   Vent Mode A/C   Humidity HME   Set Rate 16 BPM   Vt Set 450 mL   PEEP/CPAP 5 cmH20   Peak Flow 60 L/min   Peak End Inspiratory Pressure 33 cmH20   I-Trigger Type  V-TRIG   Trigger Sensitivity Flow/I-Trigger 2 L/min   Patient Ventilator Parameters   Resp Rate Total 16 br/min   Peak Airway Pressure 37 cmH2O   Mean Airway Pressure 12 cmH20   Plateau Pressure 0 cmH20   Exhaled Vt 462 mL   Total Ve 7.33 mL   I:E Ratio Measured 1:3.60   Auto PEEP 0 cmH20   Conventional Ventilator Alarms   Alarms On Y   Ve High Alarm 20 L/min   Ve Low  Alarm 3 L/min   Vt High Alarm 1200 mL   Vt Low Alarm 300 mL   Resp Rate High Alarm 40 br/min   Press High Alarm 50 cmH2O   Apnea Rate 16   Apnea Volume (mL) 450 mL   Apnea Oxygen Concentration  100   Apnea Flow Rate (L/min) 60   T Apnea 20 sec(s)   IHI Ventilator Associated Pneumonia Bundle (Required)   Head of Bed Elevated  HOB 30   Ready to Wean/Extubation Screen   FIO2<=50 (chart decimal) 0.4   MV<16L (chart vol.) 7.33   PEEP <=8 (chart #) 5   Ready to Wean Parameters   F/VT Ratio<105 (RSBI) (!) 34.63   Vital Capacity (mL) 0   Education   $ Education Bronchodilator;Suction;15 min   Respiratory Evaluation   $ Care Plan Tech Time 15 min   $ Eval/Re-eval Charges Evaluation

## 2022-07-31 NOTE — ASSESSMENT & PLAN NOTE
Continue present iv abx to cover aspiration and hospital acquired pneumonia(Pt lives in NH and Trach/Vent dependent  On  Zerbaxa and Cipro iv for ESBL proteus and Resistant ?Pseudomonas

## 2022-07-31 NOTE — PROGRESS NOTES
Progress Note  PULMONARY    Admit Date: 7/29/2022 07/31/2022 7/29/2022 - 60 yo with h/o GERD, HTN, PE, trach, vent, anoxic brain injury sent to ER from [place] for coughing and possible aspiration.  In ER was hypothermic, procalcitonin was low, UA showed UTI.  CT chest reviewed and shows chronic changes, a nodule and some inflammatory changes.  Has QTc prolongation.  I ICU was hypotensive despite fluid bolus and pressors are being started.  I d./w her sister and updated her and attempted to call her daughter to discuss code status but no answer.    Plan     · Continue vent support - vent dependent  · IVF, pressors as needed  · Broad spectrum antibiotics band follow up cultures  · Contact isolation  · Nebs and mucomyst to help with secretions  · Try to discuss with family about code status  · Follow Na, follow H/H (no acute bleeding reported)     Thank you for this consult.  I will follow with you while the patient is hospitalized.       SUBJECTIVE:     7/30 no c/o trach and anoxic  7/31 no c/o -same    PFSH and Allergies reviewed.    OBJECTIVE:     Vitals (Most recent):  Vitals:    07/31/22 0630   BP: 126/64   Pulse: (!) 54   Resp: 14   Temp:        Vitals (24 hour range):  Temp:  [96.2 °F (35.7 °C)-99.2 °F (37.3 °C)]   Pulse:  []   Resp:  [14-24]   BP: (100-139)/(51-75)   SpO2:  [98 %-100 %]       Intake/Output Summary (Last 24 hours) at 7/31/2022 0713  Last data filed at 7/31/2022 0600  Gross per 24 hour   Intake 1920.22 ml   Output 3400 ml   Net -1479.78 ml          Physical Exam:  The patient's neuro status (alertness,orientation,cognitive function,motor skills,), pharyngeal exam (oral lesions, hygiene, abn dentition,), Neck (jvd,mass,thyroid,nodes in neck and above/below clavicle),RESPIRATORY(symmetry,effort,fremitus,percussion,auscultation),  Cor(rhythm,heart tones including gallops,perfusion,edema)ABD(distention,hepatic&splenomegaly,tenderness,masses), Skin(rash,cyanosis),Psyc(affect,judgement,).   Exam negative except for these pertinent findings:    Trach,contractured, min rhonchi, no distress    Radiographs reviewed: view by direct vision    Results for orders placed during the hospital encounter of 07/29/22    X-Ray Chest 1 View    Narrative  HISTORY: infiltrate    FINDINGS: Portable chest radiograph at 0606 hours compared to 07/29/2022 shows tracheostomy cannula and right subclavian central venous catheter, unchanged in position, with the cardiomediastinal silhouette and pulmonary vasculature stable and within normal limits.    The lungs are symmetrically expanded, with persistent faint right lower lung airspace opacities, nonspecific. No large pleural effusion or pneumothorax.    IMPRESSION: Unchanged right lower lung airspace opacities, suggesting atelectasis or infiltrate.    Electronically signed by:  Nigel Rolon MD  7/30/2022 7:14 AM CDT Workstation: [id number]  ]    Labs     Recent Labs   Lab 07/31/22  0407   WBC 6.05   HGB 8.6*   HCT 28.3*        Recent Labs   Lab 07/31/22  0407      K 3.7      CO2 26   BUN 12   CREATININE <0.3*      CALCIUM 8.6*   AST 31   ALT 49*   ALKPHOS 187*   BILITOT 0.8   PROT 7.7   ALBUMIN 2.8*   No results for input(s): PH, PCO2, PO2, HCO3 in the last 24 hours.  Microbiology Results (last 7 days)     Procedure Component Value Units Date/Time    Culture, Respiratory with Gram Stain [[id number]]  (Abnormal)  (Susceptibility) Collected: 07/29/22 0306    Order Status: Completed Specimen: Respiratory from Tracheal Aspirate Updated: 07/31/22 0629     Respiratory Culture GRAM NEGATIVE CHRISTINA, NON-LACTOSE   Moderate  Identification and susceptibility pending        PROTEUS MIRABILIS ESBL     Gram Stain (Respiratory) Many WBC's     Gram Stain (Respiratory) <10 epithelial cells per low power field.     Gram Stain (Respiratory) Few Gram negative rods    Blood culture #1 **CANNOT BE ORDERED STAT** [[id number]] Collected: 07/29/22 0320    Order Status:  Completed Specimen: Blood from Peripheral, Hand, Right Updated: 07/31/22 0432     Blood Culture, Routine No Growth to date      No Growth to date      No Growth to date    Blood culture #2 **CANNOT BE ORDERED STAT** [[id number]] Collected: 07/29/22 0345    Order Status: Completed Specimen: Blood from Peripheral, Forearm, Right Updated: 07/31/22 0432     Blood Culture, Routine No Growth to date      No Growth to date      No Growth to date    Urine culture [[id number]] Collected: 07/29/22 0545    Order Status: Completed Specimen: Urine Updated: 07/30/22 0751     Urine Culture, Routine Multiple organisms isolated. None in predominance.  Repeat if      clinically necessary.    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  Added automatically from request for surgery [id number]        Resolved Hospital Problems   No resolved problems to display.               Plan:   7/30 no fever, vss, merrem/vanc  Norepi,   40% ox, procal 0.35 today    cxr patchy infiltrate right .> left  Had serratia and providentia in sputum March -- culture pending this admit    Pt stable -   [place] to f/u monday 7/31 no fever, vss wbc 6k,       esbl proteus - listed as sensitive to zosyn (but doubtful? ).    she also has pseudomonas sensitive only to zosyn in sputum  Will stop  Vanc, will check with ID abx               .

## 2022-07-31 NOTE — SUBJECTIVE & OBJECTIVE
Interval History:     Review of Systems   Unable to perform ROS: Other (Trach/Vent dependent)   Objective:     Vital Signs (Most Recent):  Temp: 96.2 °F (35.7 °C) (07/31/22 1530)  Pulse: 71 (07/31/22 1728)  Resp: 20 (07/31/22 1728)  BP: (!) 114/54 (07/31/22 1600)  SpO2: (!) 94 % (07/31/22 1728)   Vital Signs (24h Range):  Temp:  [96.2 °F (35.7 °C)-98.9 °F (37.2 °C)] 96.2 °F (35.7 °C)  Pulse:  [] 71  Resp:  [14-20] 20  SpO2:  [94 %-100 %] 94 %  BP: (100-154)/(51-82) 114/54     Weight: 75.5 kg (166 lb 7.2 oz)  Body mass index is 32.51 kg/m².    Intake/Output Summary (Last 24 hours) at 7/31/2022 1759  Last data filed at 7/31/2022 0600  Gross per 24 hour   Intake 1920.22 ml   Output 3400 ml   Net -1479.78 ml      Physical Exam  Vitals and nursing note reviewed.   Constitutional:       General: She is not in acute distress.  HENT:      Head: Atraumatic.      Right Ear: External ear normal.      Left Ear: External ear normal.      Nose: Nose normal.      Mouth/Throat:      Mouth: Mucous membranes are moist.   Neck:      Comments: Trach insitu  Cardiovascular:      Rate and Rhythm: Normal rate.   Pulmonary:      Effort: Pulmonary effort is normal.      Comments: Vent Dependent   Abdominal:      Comments: PEG tube  Tube in RUQ area   Musculoskeletal:      Right lower leg: No edema.      Left lower leg: No edema.   Skin:     General: Skin is dry.   Neurological:      Comments: Trach/Vent dependent       Significant Labs: All pertinent labs within the past 24 hours have been reviewed.  CBC:   Recent Labs   Lab 07/30/22 0429 07/31/22  0407   WBC 9.69 6.05   HGB 8.7* 8.6*   HCT 28.7* 28.3*    228     CMP:   Recent Labs   Lab 07/30/22  0429 07/31/22  0407   * 139   K 3.6 3.7    105   CO2 24 26   * 106   BUN 21* 12   CREATININE <0.3* <0.3*   CALCIUM 8.4* 8.6*   PROT 7.7 7.7   ALBUMIN 2.8* 2.8*   BILITOT 0.9 0.8   ALKPHOS 179* 187*   AST 24 31   ALT 50* 49*   ANIONGAP 8 8   EGFRNONAA >60.0 >60.0        Significant Imaging: I have reviewed all pertinent imaging results/findings within the past 24 hours.

## 2022-07-31 NOTE — PROGRESS NOTES
Pharmacokinetic Assessment Follow Up: IV Vancomycin    Patient brief summary:  Genna Felix is a 61 y.o. female initiated on antimicrobial therapy with IV Vancomycin for treatment of Pneumonia and Urinary Tract Infection    The patient's current regimen is 1250 mg every 12 hours.      Actual Body Weight = 75.5 kg (166 lb 7.2 oz).    Renal Function:   CrCl cannot be calculated (This lab value cannot be used to calculate CrCl because it is not a number: <0.3).      Vancomycin serum concentration assessment(s):    The random level was drawn correctly and can be used to guide therapy at this time. The measurement is above the desired definitive target range of 10 to 15 mcg/mL.    Vancomycin Regimen Plan:    Change regimen to Vancomycin 1250 mg IV every 30 hours with next serum trough concentration measured at 22:00 prior to 6th dose on 8/2    Drug levels (last 3 results):  Recent Labs   Lab Result Units 07/30/22  1546 07/31/22  0407   Vancomycin, Random ug/mL  --  15.3   Vancomycin-Trough ug/mL 30.5*  --          Pharmacy will continue to follow and monitor vancomycin.    Please contact pharmacy at extension [phone] for questions regarding this assessment.    Thank you for the consult,   Teresa Spencer

## 2022-07-31 NOTE — PROGRESS NOTES
Therapy with Vancomycin complete and / or consult / order discontinued by Dr London on 7/31 @ 1102   Pharmacy will sign off, please re-consult as needed.  Thank you for allowing us to participate in this patient's care.  Mega Galarza 7/31/2022 11:13 AM  Dept of Pharmacy  Ext [phone]

## 2022-07-31 NOTE — CARE UPDATE
07/30/22 1939   Patient Assessment/Suction   Level of Consciousness (AVPU) responds to pain   Respiratory Effort Normal;Unlabored   Expansion/Accessory Muscles/Retractions no use of accessory muscles   All Lung Fields Breath Sounds Anterior:;coarse   Rhythm/Pattern, Respiratory assisted mechanically   Cough Frequency with stimulation   Cough Type assisted   Suction Method oral;tracheal   Suction Pressure (mmHg) -120 mmHg   $ Suction Charges Inline Suction Procedure Stat Charge   Secretions Amount moderate   Secretions Color white   Secretions Characteristics thick   PRE-TX-O2   O2 Device (Oxygen Therapy) ventilator   $ Is the patient on Low Flow Oxygen? Yes   Oxygen Concentration (%) 40   SpO2 99 %   Pulse Oximetry Type Continuous   $ Pulse Oximetry - Multiple Charge Pulse Oximetry - Multiple   Pulse 94   Resp 17   Aerosol Therapy   $ Aerosol Therapy Charges Aerosol Treatment   Daily Review of Necessity (SVN) completed   Respiratory Treatment Status (SVN) given   Treatment Route (SVN) in-line;ventilator   Patient Position (SVN) HOB elevated   Post Treatment Assessment (SVN) breath sounds unchanged   Signs of Intolerance (SVN) none   Breath Sounds Post-Respiratory Treatment   Post-treatment Heart Rate (beats/min) 86   Post-treatment Resp Rate (breaths/min) 12   Vent Select   Conventional Vent Y   Charged w/in last 24h YES   Preset Conventional Ventilator Settings   Vent ID [id number]   Vent Type    Ventilation Type VC   Vent Mode A/C   Humidity HME   Set Rate 16 BPM   Vt Set 450 mL   PEEP/CPAP 5 cmH20   Peak Flow 60 L/min   Peak End Inspiratory Pressure 27 cmH20   I-Trigger Type  V-TRIG   Trigger Sensitivity Flow/I-Trigger 2 L/min   Patient Ventilator Parameters   Resp Rate Total 16 br/min   Peak Airway Pressure 32 cmH2O   Mean Airway Pressure 11 cmH20   Plateau Pressure 0 cmH20   Exhaled Vt 463 mL   Total Ve 7.34 mL   I:E Ratio Measured 1:3.60   Auto PEEP 0 cmH20   Tubing ID (mm) 8 mm   Tube Type Trach    Conventional Ventilator Alarms   Alarms On Y   Resp Rate High Alarm 40 br/min   Press High Alarm 50 cmH2O   Apnea Rate 16   Apnea Volume (mL) 0 mL   Apnea Oxygen Concentration  100   Apnea Flow Rate (L/min) 60   T Apnea 20 sec(s)   IHI Ventilator Associated Pneumonia Bundle (Required)   Head of Bed Elevated  HOB 30   Oral Care Mouth swabbed;Mouth suctioned;Oral suctioning prior to turn   Ready to Wean/Extubation Screen   FIO2<=50 (chart decimal) 0.4   MV<16L (chart vol.) 7.34   PEEP <=8 (chart #) 5   Ready to Wean Parameters   F/VT Ratio<105 (RSBI) (!) 36.72   Vital Capacity (mL) 0   Education   $ Education Ventilator Oxygen;15 min   Respiratory Evaluation   $ Care Plan Tech Time 15 min

## 2022-07-31 NOTE — CARE UPDATE
07/31/22 0725   Patient Assessment/Suction   Level of Consciousness (AVPU) alert  (eyes open)   Respiratory Effort Normal;Unlabored   All Lung Fields Breath Sounds wheezes, expiratory;rhonchi   Rhythm/Pattern, Respiratory assisted mechanically   $ Suction Charges Inline Suction Procedure Stat Charge   Secretions Amount large   Secretions Color yellow   Secretions Characteristics thick   PRE-TX-O2   O2 Device (Oxygen Therapy) ventilator   $ Is the patient on Low Flow Oxygen? Yes   Oxygen Concentration (%) 40   SpO2 99 %   Pulse Oximetry Type Continuous   $ Pulse Oximetry - Multiple Charge Pulse Oximetry - Multiple   Pulse (!) 50   Resp 16   Positioning HOB elevated 45 degrees   Aerosol Therapy   $ Aerosol Therapy Charges Aerosol Treatment   Daily Review of Necessity (SVN) completed   Respiratory Treatment Status (SVN) given   Treatment Route (SVN) in-line;ventilator   Patient Position (SVN) HOB elevated   Post Treatment Assessment (SVN) breath sounds improved;vital signs unchanged;congestion decreased   Signs of Intolerance (SVN) none   Airway Safety   Ambu bag with the patient? Yes, Adult Ambu   Is mask with the patient? Yes, Adult Mask   Extra trach at bedside? Yes   Extra trach sizes at bedside? 6   Vent Select   Conventional Vent Y   $ Ventilator Subsequent 1   Charged w/in last 24h YES   Preset Conventional Ventilator Settings   Vent ID [id number]   Vent Type    Ventilation Type VC   Vent Mode A/C   Humidity HME   Set Rate 16 BPM   Vt Set 450 mL   PEEP/CPAP 5 cmH20   Peak Flow 60 L/min   Peak End Inspiratory Pressure 33 cmH20   I-Trigger Type  V-TRIG   Trigger Sensitivity Flow/I-Trigger 2 L/min   Patient Ventilator Parameters   Resp Rate Total 16 br/min   Peak Airway Pressure 37 cmH2O   Mean Airway Pressure 12 cmH20   Plateau Pressure 0 cmH20   Exhaled Vt 462 mL   Total Ve 7.33 mL   I:E Ratio Measured 1:3.60   Auto PEEP 0 cmH20   Tubing ID (mm) 8 mm  (PORTEX)   Tube Type Trach   Conventional Ventilator Alarms    Alarms On Y   Ve High Alarm 20 L/min   Ve Low Alarm 3 L/min   Vt High Alarm 1200 mL   Vt Low Alarm 300 mL   Resp Rate High Alarm 40 br/min   Press High Alarm 50 cmH2O   Apnea Rate 16   Apnea Volume (mL) 450 mL   Apnea Oxygen Concentration  100   Apnea Flow Rate (L/min) 60   T Apnea 20 sec(s)   IHI Ventilator Associated Pneumonia Bundle (Required)   Head of Bed Elevated  HOB 30   Ready to Wean/Extubation Screen   FIO2<=50 (chart decimal) 0.4   MV<16L (chart vol.) 7.33   PEEP <=8 (chart #) 5   Ready to Wean Parameters   F/VT Ratio<105 (RSBI) (!) 34.63   Vital Capacity (mL) 0   Education   $ Education Bronchodilator;Suction;15 min   Respiratory Evaluation   $ Care Plan Tech Time 15 min   $ Eval/Re-eval Charges Evaluation

## 2022-07-31 NOTE — CONSULTS
Consult Note  Infectious Disease    Reason for Consult: MDR pathogens    HPI: Genna Felix is a 61 y.o. female known to our service for prior hospital admissions to [facility], last one 3/20 for sepsis secondary to Enterococcus bacteremia and MDRO pneumonia, and prior on 1/12/22 for acute cholecystitis s/p IR drainage 1/13, resident of [facility], with past medical history of anoxic brain injury status post tracheostomy/peg tube, prior PE, hypertension, and GERD, sent from NH for possible aspiration pneumonia, blood noted in tube feeding after suctioning her tracheostomy. History is very limited; patient is non-verbal at baseline, no family present.      In the ER, hypotensive, afebrile  Labs labs on admission, normal white count, no left shift, H&H 11/36.2, normal platelet count 185  Cr <0.3, patient with muscle wasting   Albumin 3.5 , AST 31/ALT 49  Procal  <0.05, 0.35, negative x 2  Lactic acid 1.8  UA cloudy, 3+ occult blood, 3+ protein, greater than 100 RBC and WBC, 3+ leukocytes, rare bacteria, pending culture   CXR: RLL infiltrate suggestive of aspiration, seen on prior images from last admission in March 2022     Admitted to ICU for septic shock likely secondary to aspiration pneumonia and UTI.     Empirically started on Vancomycin and meropenem.     ID consult for antibiotic recommendations.         Review of patient's allergies indicates:  No Known Allergies  Past Medical History:   Diagnosis Date    Anoxic brain damage 07/2020    Bedbound 07/2020    Cardiac arrest 07/2020    Cirrhosis     GERD (gastroesophageal reflux disease)     Hemangioma of intra-abdominal structure     Hypertension     Nursing home resident     Protein calorie malnutrition     Pulmonary embolus     Respiratory distress     S/P percutaneous endoscopic gastrostomy (PEG) tube placement 07/2020    Total self-care deficit 07/2020    Tracheostomy dependence     7/2020     Past Surgical History:   Procedure  Laterality Date    PEG W/TRACHEOSTOMY PLACEMENT  07/27/2020    SKIN GRAFT      buttocks     Social History     Tobacco Use    Smoking status: Never Smoker    Smokeless tobacco: Never Used   Substance Use Topics    Alcohol use: Not Currently        Family History   Problem Relation Age of Onset    No Known Problems Mother     No Known Problems Father          Review of Systems:   Unable to obtain, patient is non-verbal, no family at bedside.     Outdoor activities:  Travel:   Implants:   Antibiotic History:     EXAM & DIAGNOSTICS REVIEWED:   Vitals:     Temp:  [96.3 °F (35.7 °C)-99.2 °F (37.3 °C)]   Temp: 96.9 °F (36.1 °C) (07/31/22 1104)  Pulse: (!) 46 (07/31/22 1115)  Resp: 16 (07/31/22 1115)  BP: (!) 145/82 (07/31/22 1104)  SpO2: 100 % (07/31/22 1115)    Intake/Output Summary (Last 24 hours) at 7/31/2022 1125  Last data filed at 7/31/2022 0600  Gross per 24 hour   Intake 1920.22 ml   Output 3400 ml   Net -1479.78 ml       General:         Chronically ill-appearing, trach to vent FiO2 601%, on pressors 0.04  Eyes:               Hyperemic conjunctiva both eyes, cataract L>R both eyes, anicteric  ENT:                Contracted, unable to fully evaluate oral cavity   Neck:              Supple  Lungs:            R base rhonchi, L rales, thick secretions  Heart:              S1/S2+, regular rhythm, no murmurs  Abd:                RUQ CLARICE drain in place since 1/13/22 very minimal bilious fluid                          PEG tube in place, no evidence of redness or purulent drainage from ostomy, +BS, soft, non tender, moderately distended  :                 Hamilton, urine cloudy  Musc:              Flexure contractures on hands  Skin:                Warm, no rash   Wound:           Right elbow, sacrum better compared to last admission in March  Neuro:   Not following commands  Psych:            Unable to perform   Lymphatic:      Extrem:           No LE edema b/l - contracted  VAD:                Peripheral IV                                             R subclavian 7/30    Peripheral IVs                                                   Isolation:        Contact        General Labs reviewed:  Recent Labs   Lab 07/29/22  0337 07/30/22  0429 07/31/22  0407   WBC 4.33 9.69 6.05   HGB 11.0* 8.7* 8.6*   HCT 36.2* 28.7* 28.3*    217 228       Recent Labs   Lab 07/29/22  0337 07/30/22  0429 07/31/22  0407   * 135* 139   K 5.1 3.6 3.7   CL 96 103 105   CO2 23 24 26   BUN 31* 21* 12   CREATININE <0.3* <0.3* <0.3*   CALCIUM 8.8 8.4* 8.6*   PROT 9.3* 7.7 7.7   BILITOT 0.9 0.9 0.8   ALKPHOS 198* 179* 187*   ALT 69* 50* 49*   AST 39 24 31     No results for input(s): CRP in the last 168 hours.  No results for input(s): SEDRATE in the last 168 hours.    CrCl cannot be calculated (This lab value cannot be used to calculate CrCl because it is not a number: <0.3).     Micro:  Microbiology Results (last 7 days)       Procedure Component Value Units Date/Time    Culture, Respiratory with Gram Stain [[id number]]  (Abnormal)  (Susceptibility) Collected: 07/29/22 0306    Order Status: Completed Specimen: Respiratory from Tracheal Aspirate Updated: 07/31/22 0938     Respiratory Culture PSEUDOMONAS AERUGINOSA  Moderate        PROTEUS MIRABILIS ESBL  Moderate  Results called to and read back by RN M3 ANKIT HOLDSWORTH  07/31/2022    09:37 JT       Gram Stain (Respiratory) Many WBC's     Gram Stain (Respiratory) <10 epithelial cells per low power field.     Gram Stain (Respiratory) Few Gram negative rods    Blood culture #1 **CANNOT BE ORDERED STAT** [[id number]] Collected: 07/29/22 0320    Order Status: Completed Specimen: Blood from Peripheral, Hand, Right Updated: 07/31/22 0432     Blood Culture, Routine No Growth to date      No Growth to date      No Growth to date    Blood culture #2 **CANNOT BE ORDERED STAT** [[id number]] Collected: 07/29/22 0345    Order Status: Completed Specimen: Blood from Peripheral, Forearm, Right Updated: 07/31/22  0432     Blood Culture, Routine No Growth to date      No Growth to date      No Growth to date    Urine culture [[id number]] Collected: 07/29/22 0545    Order Status: Completed Specimen: Urine Updated: 07/30/22 0751     Urine Culture, Routine Multiple organisms isolated. None in predominance.  Repeat if      clinically necessary.    Narrative:      Specimen Source->Urine    Urine Culture High Risk [[id number]]     Order Status: Completed Specimen: Urine, Catheterized     Urine Culture High Risk [[id number]]     Order Status: Canceled Specimen: Urine, Catheterized             Imaging Reviewed:  CXRs  CT abdomen/pelvis:   Chest:   1. Persistent right basilar consolidations which could be due to atelectasis and/or pneumonia.   2. Bilateral upper and lower lobe tree-in-bud nodularities which could be infectious or inflammatory.   3. Chronic findings as described above.   4. A 1.2 cm spiculated nodule in the superior segment of the right upper lobe posteriorly. Consider a non-contrast Chest CT at 3 months, a PET/CT, or tissue sampling. These guidelines do not apply to immunocompromised patients and patients with cancer. Follow up in patients with significant comorbidities as clinically warranted. For lung cancer screening, adhere to Lung-RADS guidelines. Reference: Radiology. 2017; 284(1):228-43.     Abdomen of and pelvis:   1. Cholecystostomy pigtail catheter with multiple gallstones, unchanged.   2. Additional chronic findings as described above.     Cardiology BRENDAN 3/23/22:  · The left ventricle is normal in size with normal systolic function.  · The estimated ejection fraction is 60%.  · Normal left ventricular diastolic function.  · Normal right ventricular size with normal right ventricular systolic function.  · No interatrial septal defect present.  · Normal appearing left atrial appendage. No thrombus is present in the appendage. Normal appendage velocities.  · There is no pulmonary hypertension.  · Grade 2 plaque  present in the transverse aorta and descending aorta.     No valvular vegetations seen no abnormal intracavitary echoes  Descending aorta and transverse aorta have moderate amount of atheroma seen            IMPRESSION & PLAN     1. Septic shock likely secondary to underlying chronic aspiration pneumonia w/ superimposed bacterial infection, resident of NH, FiO2: 60%, worsening existing R base infuiltrates   Procal negative x 2   Respiratory culture 7/27 grew, Proteus mirabilis ESBL sensitive to quinolones and carbapenems, and  Pseudomonas aeruginosa sensitive to Zosyn SOPHIA <16   Blood cultures x 2 no growth to date, pending final    Urine culture multiple organisms    2. PMHx cardiac arrest, anoxic brain injury, bed-bound, cirrhosis, GERD, nursing home resident, PE, tracheostomy and PEG since July 2020.     Recommendations:  Lab called, asked for Zerbaxa sensitivities  Stop vancomycin IV  Start Zerbaxa 1.5g IV q8h for  Pseudomonas   Cipro 400mg IV q8h for ESBL Proteus   Pulmonary toilet  Aspiration precautions   Follow cultures  Prognosis guarded    Will follow     I have spent > 45 minutes providing critical care services for this pt for the above diagnoses. These services have included pt evaluation, pt exam, discussions with staff, chart review, data review, note preparation and . The patient has life threatening illness with a high risk of decompensation and/or death.    D/w nursing, Dr London    Medical Decision Making during this encounter was  [_] Low Complexity  [_] Moderate Complexity  [xx] High Complexity

## 2022-08-01 LAB
ALBUMIN SERPL BCP-MCNC: 2.8 G/DL (ref 3.5–5.2)
ALP SERPL-CCNC: 187 U/L (ref 55–135)
ALT SERPL W/O P-5'-P-CCNC: 60 U/L (ref 10–44)
ANION GAP SERPL CALC-SCNC: 6 MMOL/L (ref 8–16)
AST SERPL-CCNC: 40 U/L (ref 10–40)
BILIRUB SERPL-MCNC: 1 MG/DL (ref 0.1–1)
BUN SERPL-MCNC: 11 MG/DL (ref 6–20)
CALCIUM SERPL-MCNC: 9.1 MG/DL (ref 8.7–10.5)
CHLORIDE SERPL-SCNC: 103 MMOL/L (ref 95–110)
CO2 SERPL-SCNC: 27 MMOL/L (ref 23–29)
CREAT SERPL-MCNC: <0.3 MG/DL (ref 0.5–1.4)
ERYTHROCYTE [DISTWIDTH] IN BLOOD BY AUTOMATED COUNT: 18.3 % (ref 11.5–14.5)
EST. GFR  (NO RACE VARIABLE): >60 ML/MIN/1.73 M^2
GLUCOSE SERPL-MCNC: 106 MG/DL (ref 70–110)
HCT VFR BLD AUTO: 27.6 % (ref 37–48.5)
HGB BLD-MCNC: 8.6 G/DL (ref 12–16)
MAGNESIUM SERPL-MCNC: 1.9 MG/DL (ref 1.6–2.6)
MCH RBC QN AUTO: 26.8 PG (ref 27–31)
MCHC RBC AUTO-ENTMCNC: 31.2 G/DL (ref 32–36)
MCV RBC AUTO: 86 FL (ref 82–98)
PLATELET # BLD AUTO: 223 K/UL (ref 150–450)
PMV BLD AUTO: 11.1 FL (ref 9.2–12.9)
POTASSIUM SERPL-SCNC: 4 MMOL/L (ref 3.5–5.1)
PROT SERPL-MCNC: 7.8 G/DL (ref 6–8.4)
RBC # BLD AUTO: 3.21 M/UL (ref 4–5.4)
SODIUM SERPL-SCNC: 136 MMOL/L (ref 136–145)
WBC # BLD AUTO: 4.68 K/UL (ref 3.9–12.7)

## 2022-08-01 PROCEDURE — 94799 UNLISTED PULMONARY SVC/PX: CPT

## 2022-08-01 PROCEDURE — 99233 SBSQ HOSP IP/OBS HIGH 50: CPT | Mod: ,,, | Performed by: INTERNAL MEDICINE

## 2022-08-01 PROCEDURE — 99900035 HC TECH TIME PER 15 MIN (STAT)

## 2022-08-01 PROCEDURE — 99900031 HC PATIENT EDUCATION (STAT)

## 2022-08-01 PROCEDURE — 83735 ASSAY OF MAGNESIUM: CPT | Performed by: INTERNAL MEDICINE

## 2022-08-01 PROCEDURE — 99900026 HC AIRWAY MAINTENANCE (STAT)

## 2022-08-01 PROCEDURE — 25000242 PHARM REV CODE 250 ALT 637 W/ HCPCS

## 2022-08-01 PROCEDURE — 94761 N-INVAS EAR/PLS OXIMETRY MLT: CPT

## 2022-08-01 PROCEDURE — 80053 COMPREHEN METABOLIC PANEL: CPT | Performed by: INTERNAL MEDICINE

## 2022-08-01 PROCEDURE — 94640 AIRWAY INHALATION TREATMENT: CPT

## 2022-08-01 PROCEDURE — 27000221 HC OXYGEN, UP TO 24 HOURS

## 2022-08-01 PROCEDURE — 36415 COLL VENOUS BLD VENIPUNCTURE: CPT | Performed by: INTERNAL MEDICINE

## 2022-08-01 PROCEDURE — 25000003 PHARM REV CODE 250

## 2022-08-01 PROCEDURE — 99233 PR SUBSEQUENT HOSPITAL CARE,LEVL III: ICD-10-PCS | Mod: ,,, | Performed by: INTERNAL MEDICINE

## 2022-08-01 PROCEDURE — 99233 PR SUBSEQUENT HOSPITAL CARE,LEVL III: ICD-10-PCS | Mod: ,,, | Performed by: STUDENT IN AN ORGANIZED HEALTH CARE EDUCATION/TRAINING PROGRAM

## 2022-08-01 PROCEDURE — 94003 VENT MGMT INPAT SUBQ DAY: CPT

## 2022-08-01 PROCEDURE — 25000003 PHARM REV CODE 250: Performed by: INTERNAL MEDICINE

## 2022-08-01 PROCEDURE — 63600175 PHARM REV CODE 636 W HCPCS: Mod: JG | Performed by: STUDENT IN AN ORGANIZED HEALTH CARE EDUCATION/TRAINING PROGRAM

## 2022-08-01 PROCEDURE — 99233 SBSQ HOSP IP/OBS HIGH 50: CPT | Mod: ,,, | Performed by: STUDENT IN AN ORGANIZED HEALTH CARE EDUCATION/TRAINING PROGRAM

## 2022-08-01 PROCEDURE — 25000003 PHARM REV CODE 250: Performed by: STUDENT IN AN ORGANIZED HEALTH CARE EDUCATION/TRAINING PROGRAM

## 2022-08-01 PROCEDURE — 85027 COMPLETE CBC AUTOMATED: CPT | Performed by: INTERNAL MEDICINE

## 2022-08-01 PROCEDURE — 20000000 HC ICU ROOM

## 2022-08-01 PROCEDURE — 63600175 PHARM REV CODE 636 W HCPCS: Performed by: INTERNAL MEDICINE

## 2022-08-01 PROCEDURE — 25000242 PHARM REV CODE 250 ALT 637 W/ HCPCS: Performed by: INTERNAL MEDICINE

## 2022-08-01 RX ORDER — MUPIROCIN 20 MG/G
OINTMENT TOPICAL 2 TIMES DAILY
Status: DISCONTINUED | OUTPATIENT
Start: 2022-08-01 | End: 2022-08-05 | Stop reason: HOSPADM

## 2022-08-01 RX ORDER — CHLORHEXIDINE GLUCONATE ORAL RINSE 1.2 MG/ML
15 SOLUTION DENTAL 2 TIMES DAILY
Status: DISCONTINUED | OUTPATIENT
Start: 2022-08-01 | End: 2022-08-05 | Stop reason: HOSPADM

## 2022-08-01 RX ADMIN — IPRATROPIUM BROMIDE AND ALBUTEROL SULFATE 3 ML: .5; 3 SOLUTION RESPIRATORY (INHALATION) at 01:08

## 2022-08-01 RX ADMIN — FAMOTIDINE 20 MG: 20 TABLET ORAL at 09:08

## 2022-08-01 RX ADMIN — IPRATROPIUM BROMIDE AND ALBUTEROL SULFATE 3 ML: .5; 3 SOLUTION RESPIRATORY (INHALATION) at 07:08

## 2022-08-01 RX ADMIN — FAMOTIDINE 20 MG: 20 TABLET ORAL at 10:08

## 2022-08-01 RX ADMIN — OXYBUTYNIN CHLORIDE 5 MG: 5 TABLET ORAL at 10:08

## 2022-08-01 RX ADMIN — ACETYLCYSTEINE 4 ML: 200 SOLUTION ORAL; RESPIRATORY (INHALATION) at 07:08

## 2022-08-01 RX ADMIN — CEFTOLOZANE AND TAZOBACTAM 1500 MG: 1; .5 INJECTION, POWDER, LYOPHILIZED, FOR SOLUTION INTRAVENOUS at 11:08

## 2022-08-01 RX ADMIN — ENOXAPARIN SODIUM 40 MG: 40 INJECTION SUBCUTANEOUS at 10:08

## 2022-08-01 RX ADMIN — OXYBUTYNIN CHLORIDE 5 MG: 5 TABLET ORAL at 09:08

## 2022-08-01 RX ADMIN — MIDODRINE HYDROCHLORIDE 10 MG: 2.5 TABLET ORAL at 10:08

## 2022-08-01 RX ADMIN — CEFTOLOZANE AND TAZOBACTAM 1500 MG: 1; .5 INJECTION, POWDER, LYOPHILIZED, FOR SOLUTION INTRAVENOUS at 06:08

## 2022-08-01 RX ADMIN — CARBOXYMETHYLCELLULOSE SODIUM 1 DROP: 0.5 SOLUTION, GEL FORMING / DROPS OPHTHALMIC at 01:08

## 2022-08-01 RX ADMIN — MIDODRINE HYDROCHLORIDE 10 MG: 2.5 TABLET ORAL at 03:08

## 2022-08-01 RX ADMIN — MUPIROCIN 1 G: 20 OINTMENT TOPICAL at 09:08

## 2022-08-01 RX ADMIN — MIDODRINE HYDROCHLORIDE 10 MG: 2.5 TABLET ORAL at 09:08

## 2022-08-01 RX ADMIN — CIPROFLOXACIN 400 MG: 2 INJECTION, SOLUTION INTRAVENOUS at 03:08

## 2022-08-01 RX ADMIN — CEFTOLOZANE AND TAZOBACTAM 1500 MG: 1; .5 INJECTION, POWDER, LYOPHILIZED, FOR SOLUTION INTRAVENOUS at 01:08

## 2022-08-01 RX ADMIN — CARBOXYMETHYLCELLULOSE SODIUM 1 DROP: 0.5 SOLUTION, GEL FORMING / DROPS OPHTHALMIC at 04:08

## 2022-08-01 RX ADMIN — ACETYLCYSTEINE 4 ML: 200 SOLUTION ORAL; RESPIRATORY (INHALATION) at 02:08

## 2022-08-01 RX ADMIN — CHLORHEXIDINE GLUCONATE 15 ML: 1.2 RINSE ORAL at 09:08

## 2022-08-01 RX ADMIN — IPRATROPIUM BROMIDE AND ALBUTEROL SULFATE 3 ML: .5; 3 SOLUTION RESPIRATORY (INHALATION) at 02:08

## 2022-08-01 RX ADMIN — NOREPINEPHRINE BITARTRATE 0.02 MCG/KG/MIN: 4 INJECTION, SOLUTION INTRAVENOUS at 08:08

## 2022-08-01 RX ADMIN — CHLORHEXIDINE GLUCONATE 15 ML: 1.2 RINSE ORAL at 10:08

## 2022-08-01 RX ADMIN — CARBOXYMETHYLCELLULOSE SODIUM 1 DROP: 0.5 SOLUTION, GEL FORMING / DROPS OPHTHALMIC at 09:08

## 2022-08-01 RX ADMIN — CARBOXYMETHYLCELLULOSE SODIUM 1 DROP: 0.5 SOLUTION, GEL FORMING / DROPS OPHTHALMIC at 10:08

## 2022-08-01 RX ADMIN — MUPIROCIN 1 G: 20 OINTMENT TOPICAL at 10:08

## 2022-08-01 NOTE — PROGRESS NOTES
[facility]  Adult Nutrition   Progress Note (Nutrition Support Management)    SUMMARY      Recommendations:   Continue current TF of Jevity 1.5 at the goal rate of 50 mls/h with 1 packet of XIOMARA BID to provide:                                          1960 kcals, 82 g protein and 912 mls water       Goals:   Goals: Patient to meet >75% of EEN/EPN via TF    Dietitian Rounds Brief  F/U Nutrition Note: Observed patient during rounds this morning and her TF was off and I asked her nurse why and it was to change out her trach due to her aspirating on some of her secretions. I asked her to turn it back on soon as possible after completion and she said she would. Will continue to follow prn.    Diet order: NPO    TF rate/provision: 50/Jevity 1.5    % Intake of Estimated Energy Needs: 75 - 100 %  % Meal Intake: NPO    Estimated/Assessed Needs  Weight Used For Calorie Calculations: 77 kg (169 lb 12.1 oz)     Energy Need Method: [facility]  Protein Requirements: 68-91gm (1.5-2gm/kg IBW)  Weight Used For Protein Calculations: 45.5 kg (100 lb 5 oz) (IBW)  Fluid Requirements (mL): 1925 (25ml/kg)             Weight History:  Wt Readings from Last 5 Encounters:   08/01/22 76.2 kg (167 lb 15.9 oz)   03/25/22 70.9 kg (156 lb 4.9 oz)   01/26/22 69.8 kg (153 lb 14.1 oz)   12/26/21 62.6 kg (138 lb)   10/26/21 62.8 kg (138 lb 7.2 oz)        Reason for Assessment  Reason For Assessment: consult, new tube feeding  Diagnosis:  (Aspiration pneumonia)  Relevant Medical History: trach dependent, PEG, cirrhosis, malnutrition    Medications:Pertinent Medications Reviewed  Scheduled Meds:   acetylcysteine 200 mg/ml (20%)  4 mL Nebulization Q6H    albuterol-ipratropium  3 mL Nebulization Q6H    carboxymethylcellulose  1 drop Ophthalmic QID    ceftolozane-tazobactam  1,500 mg Intravenous Q8H    chlorhexidine  15 mL Mouth/Throat BID    ciprofloxacin  400 mg Intravenous Q12H    enoxparin  40 mg Subcutaneous Q24H    famotidine   20 mg Per G Tube BID    midodrine  10 mg Per G Tube TID    mupirocin   Nasal BID    oxybutynin  5 mg Oral BID     Continuous Infusions:   sodium chloride 0.9% 50 mL/hr at 08/01/22 0701    NORepinephrine bitartrate-D5W Stopped (08/01/22 1140)     PRN Meds:.acetaminophen, carboxymethylcellulose sodium, dextrose 10%, dextrose 10%, magnesium oxide, magnesium oxide, potassium bicarbonate, potassium bicarbonate, potassium bicarbonate, potassium, sodium phosphates, potassium, sodium phosphates, potassium, sodium phosphates    Labs: Pertinent Labs Reviewed  Clinical Chemistry:  Recent Labs   Lab 07/29/22 0337 07/30/22 0429 07/31/22 0407 08/01/22  0404 08/01/22  0416   *   < > 139 136  --    K 5.1   < > 3.7 4.0  --    CL 96   < > 105 103  --    CO2 23   < > 26 27  --    GLU 97   < > 106 106  --    BUN 31*   < > 12 11  --    CREATININE <0.3*   < > <0.3* <0.3*  --    CALCIUM 8.8   < > 8.6* 9.1  --    PROT 9.3*   < > 7.7 7.8  --    ALBUMIN 3.5   < > 2.8* 2.8*  --    BILITOT 0.9   < > 0.8 1.0  --    ALKPHOS 198*   < > 187* 187*  --    AST 39   < > 31 40  --    ALT 69*   < > 49* 60*  --    ANIONGAP 9   < > 8 6*  --    ESTGFRAFRICA >60.0   < > >60.0  --   --    EGFRNONAA >60.0   < > >60.0  --   --    MG 1.9  --   --   --  1.9   LIPASE 20  --   --   --   --     < > = values in this interval not displayed.     CBC:   Recent Labs   Lab 08/01/22 0404   WBC 4.68   RBC 3.21*   HGB 8.6*   HCT 27.6*      MCV 86   MCH 26.8*   MCHC 31.2*     Cardiac Profile:  Recent Labs   Lab 07/29/22 0337   BNP 29   TROPONINI <0.030     Monitor and Evaluation  Food and Nutrient Intake: enteral nutrition intake  Food and Nutrient Adminstration: enteral and parenteral nutrition administration  Physical Activity and Function: nutrition-related ADLs and IADLs  Anthropometric Measurements: weight change  Biochemical Data, Medical Tests and Procedures: electrolyte and renal panel, gastrointestinal profile, glucose/endocrine  profile  Nutrition-Focused Physical Findings: overall appearance     Nutrition Risk  Level of Risk/Frequency of Follow-up: high     Nutrition Follow-Up  RD Follow-up?: Yes    Estrella Jo RD 08/01/2022 3:17 PM

## 2022-08-01 NOTE — PHYSICIAN QUERY
PT Name: Genna Felix  MR #: [id number]     DOCUMENTATION CLARIFICATION      CDS/: Kyra Logan Pe               Contact information: [phone]     This form is a permanent document in the medical record.     Query Date: August 1, 2022    Dear Provider,  By submitting this query, we are merely seeking further clarification of documentation.  Please utilize your independent clinical judgment when addressing the question(s) below.     The Medical Record contains the following:    Supporting Clinical Findings Location in Medical Record   UTI - Catheter associated  Vs Contaminant Per  07/29 H&P    Hamilton cath POA   UA Cloudy > 100 RBC // > 100 WBC / Rate Bacteria / 25 HC   UCX Multiple organisms isolated.None in predominance  LA 1.8 / Procal Neg > 0.035  Meds : Vanc, Merrem and LR in ER > Zerbaxa and Cipro  Per ER   Per Lab Report      Per MAR      Please clarify if the Catheter associated UTI  diagnosis has been:    [  ] Ruled In   [ X ] Ruled Out - Likely contaminant    [  ] Other/Clarification of findings (please specify): _______________     Please document in your progress notes daily for the duration of treatment, until resolved, and include in your discharge summary.

## 2022-08-01 NOTE — PROGRESS NOTES
Pulmonary/Critical Care  Progress Note      Patient name: Genna Felix  MRN: [id number]  Date: 08/01/2022    Admit Date: 7/29/2022  Consult Requested By: Figueroa Flores MD    Reason for Consult: Respiratory failure, chronic, aspiration    HPI:    7/29/2022 - 62 yo with h/o GERD, HTN, PE, trach, vent, anoxic brain injury sent to ER from [place] for coughing and possible aspiration.  In ER was hypothermic, procalcitonin was low, UA showed UTI.  CT chest reviewed and shows chronic changes, a nodule and some inflammatory changes.  Has QTc prolongation.  I ICU was hypotensive despite fluid bolus and pressors are being started.  I d./w her sister and updated her and attempted to call her daughter to discuss code status but no answer.    8/1/2022 - Events of weekend noted, no distress.  Remain unresponsive.  Has a lot of purulent secretions from trach.  Nursing reports that family has not visited.    Review of Systems    Review of Systems   Unable to perform ROS: Mental acuity       Past Medical History    Past Medical History:   Diagnosis Date    Anoxic brain damage 07/2020    Bedbound 07/2020    Cardiac arrest 07/2020    Cirrhosis     GERD (gastroesophageal reflux disease)     Hemangioma of intra-abdominal structure     Hypertension     Nursing home resident     Protein calorie malnutrition     Pulmonary embolus     Respiratory distress     S/P percutaneous endoscopic gastrostomy (PEG) tube placement 07/2020    Total self-care deficit 07/2020    Tracheostomy dependence     7/2020       Past Surgical History    Past Surgical History:   Procedure Laterality Date    PEG W/TRACHEOSTOMY PLACEMENT  07/27/2020    SKIN GRAFT      buttocks       Medications (scheduled):      acetylcysteine 200 mg/ml (20%)  4 mL Nebulization Q6H    albuterol-ipratropium  3 mL Nebulization Q6H    carboxymethylcellulose  1 drop Ophthalmic QID    ceftolozane-tazobactam  1,500 mg Intravenous Q8H    chlorhexidine  15 mL Mouth/Throat BID     ciprofloxacin  400 mg Intravenous Q12H    enoxparin  40 mg Subcutaneous Q24H    famotidine  20 mg Per G Tube BID    midodrine  10 mg Per G Tube TID    mupirocin   Nasal BID    oxybutynin  5 mg Oral BID       Medications (infusions):      sodium chloride 0.9% 50 mL/hr at 08/01/22 0701    NORepinephrine bitartrate-D5W Stopped (08/01/22 1140)       Medications (prn):     acetaminophen, carboxymethylcellulose sodium, dextrose 10%, dextrose 10%, magnesium oxide, magnesium oxide, potassium bicarbonate, potassium bicarbonate, potassium bicarbonate, potassium, sodium phosphates, potassium, sodium phosphates, potassium, sodium phosphates    Family History:   Family History   Problem Relation Age of Onset    No Known Problems Mother     No Known Problems Father        Social History: Tobacco:   Social History     Tobacco Use   Smoking Status Never Smoker   Smokeless Tobacco Never Used                                EtOH:   Social History     Substance and Sexual Activity   Alcohol Use Not Currently                                Drugs:   Social History     Substance and Sexual Activity   Drug Use Not Currently         Physical Exam    Vital signs:  Temp:  [95 °F (35 °C)-97.7 °F (36.5 °C)]   Pulse:  [46-96]   Resp:  [16-30]   BP: ()/(50-92)   SpO2:  [94 %-100 %]     Intake/Output:     Intake/Output Summary (Last 24 hours) at 8/1/2022 1453  Last data filed at 8/1/2022 1140  Gross per 24 hour   Intake 2276.39 ml   Output 1900 ml   Net 376.39 ml        BMI: Estimated body mass index is 32.81 kg/m² as calculated from the following:    Height as of this encounter: 5' (1.524 m).    Weight as of this encounter: 76.2 kg (167 lb 15.9 oz).    Physical Exam  Vitals and nursing note reviewed.   Constitutional:       General: She is not in acute distress.     Appearance: She is ill-appearing (chronically). She is not toxic-appearing or diaphoretic.      Comments: Eyes open, no response and does not fix on objects   HENT:       Head: Normocephalic and atraumatic.      Right Ear: External ear normal.      Left Ear: External ear normal.      Nose: Nose normal. No congestion or rhinorrhea.      Mouth/Throat:      Mouth: Mucous membranes are moist.      Pharynx: Oropharynx is clear. No oropharyngeal exudate or posterior oropharyngeal erythema.   Eyes:      General: No scleral icterus.        Right eye: No discharge.         Left eye: No discharge.      Extraocular Movements: Extraocular movements intact.      Conjunctiva/sclera: Conjunctivae normal.      Pupils: Pupils are equal, round, and reactive to light.      Comments: scleritis   Neck:      Vascular: No carotid bruit.      Comments: Trach   Cardiovascular:      Rate and Rhythm: Normal rate and regular rhythm.      Pulses: Normal pulses.      Heart sounds: No murmur heard.    No friction rub. No gallop.   Pulmonary:      Effort: Pulmonary effort is normal. No respiratory distress.      Breath sounds: No stridor. Rhonchi present. No wheezing or rales.      Comments: Ventilated   Chest:      Chest wall: No tenderness.   Abdominal:      General: There is no distension.      Tenderness: There is no abdominal tenderness. There is no guarding.      Comments: PEG  Biliary drain in place  Hypoactive BS   Genitourinary:     Comments: carroll  Musculoskeletal:         General: No swelling. Normal range of motion.      Cervical back: Normal range of motion and neck supple. No rigidity or tenderness.      Right lower leg: No edema.      Left lower leg: No edema.      Comments: + contractures   Lymphadenopathy:      Cervical: No cervical adenopathy.   Skin:     General: Skin is warm and dry.      Coloration: Skin is not jaundiced.      Findings: No bruising.   Neurological:      Comments: Unresponsive  Chronic changes  No acute changes reported   Psychiatric:      Comments: Calm          Laboratory    Recent Labs   Lab 08/01/22  0404   WBC 4.68   RBC 3.21*   HGB 8.6*   HCT 27.6*      MCV 86    MCH 26.8*   MCHC 31.2*       Recent Labs   Lab 08/01/22  0404   CALCIUM 9.1   PROT 7.8      K 4.0   CO2 27      BUN 11   CREATININE <0.3*   ALKPHOS 187*   ALT 60*   AST 40   BILITOT 1.0       No results for input(s): PT, INR, APTT in the last 24 hours.    No results for input(s): CPK, CPKMB, TROPONINI, MB in the last 24 hours.    Additional labs: reviewed    Microbiology:       Microbiology Results (last 7 days)     Procedure Component Value Units Date/Time    Culture, Respiratory with Gram Stain [[id number]]  (Abnormal)  (Susceptibility) Collected: 07/29/22 0306    Order Status: Completed Specimen: Respiratory from Tracheal Aspirate Updated: 08/01/22 1228     Respiratory Culture PSEUDOMONAS AERUGINOSA  Moderate        PROTEUS MIRABILIS ESBL  Moderate  Results called to and read back by RN M3 ANKIT HOLDSWORTH  07/31/2022    09:37 JT       Comment: Previous value was PPR verified by JACIEL at 08:37 on 07/30/2022  Previous comment was modified by JFERNANDOT at 06:29 on 07/31/2022   Identification and susceptibility pending          Gram Stain (Respiratory) Many WBC's     Gram Stain (Respiratory) <10 epithelial cells per low power field.     Gram Stain (Respiratory) Few Gram negative rods    Blood culture #1 **CANNOT BE ORDERED STAT** [[id number]] Collected: 07/29/22 0320    Order Status: Completed Specimen: Blood from Peripheral, Hand, Right Updated: 08/01/22 0432     Blood Culture, Routine No Growth to date      No Growth to date      No Growth to date      No Growth to date    Blood culture #2 **CANNOT BE ORDERED STAT** [[id number]] Collected: 07/29/22 0345    Order Status: Completed Specimen: Blood from Peripheral, Forearm, Right Updated: 08/01/22 0432     Blood Culture, Routine No Growth to date      No Growth to date      No Growth to date      No Growth to date    Urine culture [[id number]] Collected: 07/29/22 0545    Order Status: Completed Specimen: Urine Updated: 07/30/22 0751     Urine Culture, Routine  Multiple organisms isolated. None in predominance.  Repeat if      clinically necessary.    Narrative:      Specimen Source->Urine    Urine Culture High Risk [[id number]]     Order Status: Completed Specimen: Urine, Catheterized     Urine Culture High Risk [[id number]]     Order Status: Canceled Specimen: Urine, Catheterized           Radiology    X-Ray Chest 1 View  HISTORY: infiltrate    FINDINGS: Portable chest radiograph at 0606 hours compared to 07/29/2022 shows tracheostomy cannula and right subclavian central venous catheter, unchanged in position, with the cardiomediastinal silhouette and pulmonary vasculature stable and within normal limits.    The lungs are symmetrically expanded, with persistent faint right lower lung airspace opacities, nonspecific. No large pleural effusion or pneumothorax.    IMPRESSION: Unchanged right lower lung airspace opacities, suggesting atelectasis or infiltrate.    Electronically signed by:  Nigel Rolon MD  7/30/2022 7:14 AM CDT Workstation: [id number]        Additional Studies    reviewed    Ventilator Information    Vent Mode: A/C  Oxygen Concentration (%):  [30-35] 30  Resp Rate Total:  [16 br/min-97 br/min] 21 br/min  Vt Set:  [450 mL] 450 mL  PEEP/CPAP:  [5 cmH20] 5 cmH20  Mean Airway Pressure:  [9.7 zlM14-71 cmH20] 11 cmH20         No results for input(s): PH, PCO2, PO2, HCO3, POCSATURATED, BE in the last 72 hours.      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  Added automatically from request for surgery [id number]        Resolved Hospital Problems   No resolved problems to display.       Plan    · Continue vent support - vent dependent  · IVF, pressors as needed  · Broad spectrum antibiotics band follow up cultures - ID following  · Contact isolation  · Nebs and mucomyst to help with secretions  · Try to discuss with family about code status    Thank you for this consult.  I will follow with you while the patient is hospitalized.          Reji Andrews MD  [facility] Pulmonary/Critical Care

## 2022-08-01 NOTE — PLAN OF CARE
POC reviewed with patient. Levophed weaned off. IV antibiotics continue. No acute events today. Will continue to monitor.

## 2022-08-01 NOTE — CARE UPDATE
08/01/22 0750   Patient Assessment/Suction   Level of Consciousness (AVPU) alert   Respiratory Effort Unlabored   Expansion/Accessory Muscles/Retractions expansion symmetric   All Lung Fields Breath Sounds Anterior:;diminished   Rhythm/Pattern, Respiratory assisted mechanically   Cough Frequency with stimulation   Cough Type assisted   Suction Method oral;tracheal   Suction Pressure (mmHg) 120 mmHg   $ Suction Charges Inline Suction Procedure Stat Charge   PRE-TX-O2   O2 Device (Oxygen Therapy) ventilator   $ Is the patient on Low Flow Oxygen? Yes   Oxygen Concentration (%) 35   SpO2 98 %   Pulse Oximetry Type Continuous   $ Pulse Oximetry - Multiple Charge Pulse Oximetry - Multiple   Pulse 80   Resp 16   Aerosol Therapy   $ Aerosol Therapy Charges Aerosol Treatment   Daily Review of Necessity (SVN) completed   Respiratory Treatment Status (SVN) given   Treatment Route (SVN) in-line   Patient Position (SVN) semi-Le's   Post Treatment Assessment (SVN) breath sounds unchanged   Signs of Intolerance (SVN) none   Breath Sounds Post-Respiratory Treatment   Throughout All Fields Post-Treatment All Fields   Throughout All Fields Post-Treatment Anterior:   Post-treatment Heart Rate (beats/min) 91   Post-treatment Resp Rate (breaths/min) 20        Surgical Airway Portex Cuffed   No placement date or time found.   Present Prior to Hospital Arrival?: Yes  Type: Tracheostomy  Brand: Portex  Airway Device Size: 8.0  Style: Cuffed   Cuff Inflation? Inflated   Status Secured   Site Assessment Clean;Dry;Drainage   Site Care Cleansed;Dried;Dressing applied   Airway Safety   Ambu bag with the patient? Yes, Adult Ambu   Is mask with the patient? Yes, Adult Mask   Suction set is at the bedside? Yes   ETT at Bedside? Yes   ETT Size 6   Extra trach at bedside? Yes   Extra trach sizes at bedside? 8   Is obturator available? No   Vent Select   Conventional Vent Y   $ Ventilator Subsequent 1   Charged w/in last 24h YES   Preset  Conventional Ventilator Settings   Vent ID [id number]   Vent Type    Ventilation Type VC   Vent Mode A/C   Set Rate 16 BPM   Vt Set 450 mL   PEEP/CPAP 5 cmH20   Peak Flow 60 L/min   Peak End Inspiratory Pressure 24 cmH20   I-Trigger Type  V-TRIG   Trigger Sensitivity Flow/I-Trigger 2 L/min   Patient Ventilator Parameters   Resp Rate Total 16 br/min   Peak Airway Pressure 28 cmH2O   Mean Airway Pressure 10 cmH20   Plateau Pressure 0 cmH20   Exhaled Vt 435 mL   Total Ve 6.88 mL   I:E Ratio Measured 1:3.60   Auto PEEP 0 cmH20   Conventional Ventilator Alarms   Alarms On Y   Ve High Alarm 20 L/min   Ve Low Alarm 3 L/min   Vt High Alarm 1200 mL   Vt Low Alarm 300 mL   Resp Rate High Alarm 40 br/min   Press High Alarm 60 cmH2O   Apnea Rate 16   Apnea Volume (mL) 0 mL   Apnea Oxygen Concentration  100   Apnea Flow Rate (L/min) 60   T Apnea 20 sec(s)   Ready to Wean/Extubation Screen   FIO2<=50 (chart decimal) 0.35   MV<16L (chart vol.) 6.88   PEEP <=8 (chart #) 5   Ready to Wean Parameters   F/VT Ratio<105 (RSBI) (!) 36.78   Vital Capacity (mL) 0   Education   $ Education Bronchodilator;15 min   Respiratory Evaluation   $ Care Plan Tech Time 15 min   $ Eval/Re-eval Charges Evaluation

## 2022-08-01 NOTE — PROGRESS NOTES
Consult Note  Infectious Disease    Reason for Consult: MDR pathogens    HPI: Genna Felix is a 61 y.o. female known to our service for prior hospital admissions to [facility], last one 3/20 for sepsis secondary to Enterococcus bacteremia and MDRO pneumonia, and prior on 1/12/22 for acute cholecystitis s/p IR drainage 1/13, resident of [facility], with past medical history of anoxic brain injury status post tracheostomy/peg tube, prior PE, hypertension, and GERD, sent from NH for possible aspiration pneumonia, blood noted in tube feeding after suctioning her tracheostomy. History is very limited; patient is non-verbal at baseline, no family present.      In the ER, hypotensive, afebrile  Labs labs on admission, normal white count, no left shift, H&H 11/36.2, normal platelet count 185  Cr <0.3, patient with muscle wasting   Albumin 3.5 , AST 31/ALT 49  Procal  <0.05, 0.35, negative x 2  Lactic acid 1.8  UA cloudy, 3+ occult blood, 3+ protein, greater than 100 RBC and WBC, 3+ leukocytes, rare bacteria, pending culture   CXR: RLL infiltrate suggestive of aspiration, seen on prior images from last admission in March 2022     Admitted to ICU for septic shock likely secondary to aspiration pneumonia and UTI.     Empirically started on Vancomycin and meropenem.     ID consult for antibiotic recommendations.     INTERVAL HISTORY:  8/1:  Interim reviewed, patient seen and examined at bedside.  Pressors just turned off, hemodynamically stable, afebrile, on bear hugger.  FiO2 requirements came down to 30%, still copious amount of purulent secretions from tracheostomy site.  She is staring at the wall, non-verbal from anoxic brain injury. Adequate urinary output, had a bowel movement normal overnight.  Labs reviewed, stable white count, H&H 8.6/27.6, platelet count 223. Creatinine less than 0.3, generalized muscle wasting, normal electrolytes, AST 40/ALT 60. Micro lab contacted, will send respiratory culture for  ceftazidime/tazobactam sensitivity.     Review of patient's allergies indicates:  No Known Allergies  Past Medical History:   Diagnosis Date    Anoxic brain damage 07/2020    Bedbound 07/2020    Cardiac arrest 07/2020    Cirrhosis     GERD (gastroesophageal reflux disease)     Hemangioma of intra-abdominal structure     Hypertension     Nursing home resident     Protein calorie malnutrition     Pulmonary embolus     Respiratory distress     S/P percutaneous endoscopic gastrostomy (PEG) tube placement 07/2020    Total self-care deficit 07/2020    Tracheostomy dependence     7/2020     Past Surgical History:   Procedure Laterality Date    PEG W/TRACHEOSTOMY PLACEMENT  07/27/2020    SKIN GRAFT      buttocks     Social History     Tobacco Use    Smoking status: Never Smoker    Smokeless tobacco: Never Used   Substance Use Topics    Alcohol use: Not Currently        Family History   Problem Relation Age of Onset    No Known Problems Mother     No Known Problems Father          Review of Systems:   Unable to obtain, patient is non-verbal, no family at bedside.     Outdoor activities:  Travel:   Implants:   Antibiotic History:     EXAM & DIAGNOSTICS REVIEWED:   Vitals:     Temp:  [95 °F (35 °C)-97.7 °F (36.5 °C)]   Temp: (!) 95 °F (35 °C) (08/01/22 0200)  Pulse: 66 (08/01/22 1053)  Resp: 16 (08/01/22 1053)  BP: (!) 99/58 (08/01/22 1053)  SpO2: 100 % (08/01/22 1053)    Intake/Output Summary (Last 24 hours) at 8/1/2022 1141  Last data filed at 8/1/2022 0835  Gross per 24 hour   Intake 2116.62 ml   Output 1900 ml   Net 216.62 ml       General:         Chronically ill-appearing, trach to vent FiO2 30%, pressors just turned off  Eyes:               Hyperemic conjunctiva both eyes, cataract L>R both eyes, anicteric  ENT:                Contracted, moist mucous membranes, no thrush, edentulous    Neck:              Supple  Lungs:            R base rhonchi, L rales, thick secretions  Heart:               S1/S2+, regular rhythm, no murmurs  Abd:                RUQ CLARICE drain in place since 1/13/22 very minimal bilious fluid                          PEG tube in place, no evidence of redness or purulent drainage from ostomy, +BS, soft, non tender, moderately distended  :                 Hamilton, urine cloudy  Musc:              Flexure contractures on hands  Skin:                Warm, no rash   Wound:           Right elbow, sacrum better compared to last admission in March  Neuro:   Not following commands  Psych:            Unable to perform   Lymphatic:      Extrem:           No LE edema b/l - contracted  VAD:                Peripheral IV                                            R subclavian 7/30    Peripheral IVs                                                   Isolation:        Contact        General Labs reviewed:  Recent Labs   Lab 07/30/22 0429 07/31/22 0407 08/01/22  0404   WBC 9.69 6.05 4.68   HGB 8.7* 8.6* 8.6*   HCT 28.7* 28.3* 27.6*    228 223       Recent Labs   Lab 07/30/22 0429 07/31/22 0407 08/01/22  0404   * 139 136   K 3.6 3.7 4.0    105 103   CO2 24 26 27   BUN 21* 12 11   CREATININE <0.3* <0.3* <0.3*   CALCIUM 8.4* 8.6* 9.1   PROT 7.7 7.7 7.8   BILITOT 0.9 0.8 1.0   ALKPHOS 179* 187* 187*   ALT 50* 49* 60*   AST 24 31 40     No results for input(s): CRP in the last 168 hours.  No results for input(s): SEDRATE in the last 168 hours.    CrCl cannot be calculated (This lab value cannot be used to calculate CrCl because it is not a number: <0.3).     Micro:  Microbiology Results (last 7 days)     Procedure Component Value Units Date/Time    Culture, Respiratory with Gram Stain [[id number]]  (Abnormal)  (Susceptibility) Collected: 07/29/22 0306    Order Status: Completed Specimen: Respiratory from Tracheal Aspirate Updated: 08/01/22 1136     Respiratory Culture PSEUDOMONAS AERUGINOSA  Moderate        PROTEUS MIRABILIS ESBL  Moderate  Results called to and read back by JASBIR PHAM  HOLDSWORTH  07/31/2022    09:37 JT       Comment: Previous value was PPR verified by JJT at 08:37 on 07/30/2022        Gram Stain (Respiratory) Many WBC's     Gram Stain (Respiratory) <10 epithelial cells per low power field.     Gram Stain (Respiratory) Few Gram negative rods    Blood culture #1 **CANNOT BE ORDERED STAT** [[id number]] Collected: 07/29/22 0320    Order Status: Completed Specimen: Blood from Peripheral, Hand, Right Updated: 08/01/22 0432     Blood Culture, Routine No Growth to date      No Growth to date      No Growth to date      No Growth to date    Blood culture #2 **CANNOT BE ORDERED STAT** [[id number]] Collected: 07/29/22 0345    Order Status: Completed Specimen: Blood from Peripheral, Forearm, Right Updated: 08/01/22 0432     Blood Culture, Routine No Growth to date      No Growth to date      No Growth to date      No Growth to date    Urine culture [[id number]] Collected: 07/29/22 0545    Order Status: Completed Specimen: Urine Updated: 07/30/22 0751     Urine Culture, Routine Multiple organisms isolated. None in predominance.  Repeat if      clinically necessary.    Narrative:      Specimen Source->Urine    Urine Culture High Risk [[id number]]     Order Status: Completed Specimen: Urine, Catheterized     Urine Culture High Risk [[id number]]     Order Status: Canceled Specimen: Urine, Catheterized           Imaging Reviewed:  CXRs  CT abdomen/pelvis:   Chest:   1. Persistent right basilar consolidations which could be due to atelectasis and/or pneumonia.   2. Bilateral upper and lower lobe tree-in-bud nodularities which could be infectious or inflammatory.   3. Chronic findings as described above.   4. A 1.2 cm spiculated nodule in the superior segment of the right upper lobe posteriorly. Consider a non-contrast Chest CT at 3 months, a PET/CT, or tissue sampling. These guidelines do not apply to immunocompromised patients and patients with cancer. Follow up in patients with significant  comorbidities as clinically warranted. For lung cancer screening, adhere to Lung-RADS guidelines. Reference: Radiology. 2017; 284(1):228-43.     Abdomen of and pelvis:   1. Cholecystostomy pigtail catheter with multiple gallstones, unchanged.   2. Additional chronic findings as described above.     Cardiology BRENDAN 3/23/22:  · The left ventricle is normal in size with normal systolic function.  · The estimated ejection fraction is 60%.  · Normal left ventricular diastolic function.  · Normal right ventricular size with normal right ventricular systolic function.  · No interatrial septal defect present.  · Normal appearing left atrial appendage. No thrombus is present in the appendage. Normal appendage velocities.  · There is no pulmonary hypertension.  · Grade 2 plaque present in the transverse aorta and descending aorta.     No valvular vegetations seen no abnormal intracavitary echoes  Descending aorta and transverse aorta have moderate amount of atheroma seen            IMPRESSION & PLAN     1. Septic shock resolved, likely secondary to underlying chronic aspiration pneumonia w/ superimposed bacterial infection, resident of NH, oxygen requirements decreased to 30%   Procal negative x 2   Respiratory culture 7/27 grew, Proteus mirabilis ESBL sensitive to quinolones and carbapenems, and  Pseudomonas aeruginosa sensitive to Zosyn SOPHIA <16   Blood cultures x 2 no growth to date, pending final    Urine culture multiple organisms    2. PMHx cardiac arrest, anoxic brain injury, bed-bound, cirrhosis, GERD, nursing home resident, PE, tracheostomy and PEG since July 2020.     Recommendations:  Continue Zerbaxa 1.5g IV q8h for  Pseudomonas   Cipro 400mg IV q8h for ESBL Proteus   Lab called, sensitivities will be send out  CXR ordered   Pulmonary toilet  Aspiration precautions   Follow cultures  Prognosis guarded    Will follow     D/w nursing    Medical Decision Making during this encounter was  [_] Low Complexity  [_]  Moderate Complexity  [xx] High Complexity

## 2022-08-01 NOTE — CARE UPDATE
07/31/22 2052   Patient Assessment/Suction   Level of Consciousness (AVPU) responds to pain   Respiratory Effort Normal;Unlabored   Expansion/Accessory Muscles/Retractions no retractions;no use of accessory muscles   All Lung Fields Breath Sounds coarse;equal bilaterally   Rhythm/Pattern, Respiratory assisted mechanically   Cough Frequency infrequent;with stimulation   Cough Type assisted;productive   Suction Method oral;tracheal   Suction Pressure (mmHg) 120 mmHg   $ Suction Charges Inline Suction Procedure Stat Charge   Secretions Amount large   Secretions Color yellow;tan   Secretions Characteristics thick   PRE-TX-O2   O2 Device (Oxygen Therapy) ventilator   Oxygen Concentration (%) 35   SpO2 100 %   Pulse Oximetry Type Continuous   $ Pulse Oximetry - Multiple Charge Pulse Oximetry - Multiple   Oximetry Probe Site Assessed;Intact   Pulse (!) 53   Resp 16   Aerosol Therapy   $ Aerosol Therapy Charges Aerosol Treatment   Daily Review of Necessity (SVN) completed   Respiratory Treatment Status (SVN) given   Treatment Route (SVN) in-line   Patient Position (SVN) HOB elevated   Post Treatment Assessment (SVN) breath sounds unchanged   Signs of Intolerance (SVN) none   Breath Sounds Post-Respiratory Treatment   Throughout All Fields Post-Treatment All Fields   Throughout All Fields Post-Treatment no change   Post-treatment Heart Rate (beats/min) 57   Post-treatment Resp Rate (breaths/min) 16        Surgical Airway Portex Cuffed   No placement date or time found.   Present Prior to Hospital Arrival?: Yes  Type: Tracheostomy  Brand: Portex  Airway Device Size: 8.0  Style: Cuffed   Cuff Pressure   (mlt)   Cuff Inflation? Inflated   Status Secured   Site Assessment Dry;Clean;No drainage   Site Care Dried;Dressing applied;Cleansed   Ties Assessment Clean;Dry;Not needed;Intact   Airway Safety   Ambu bag with the patient? Yes, Adult Ambu   Is mask with the patient? Yes, Adult Mask   Suction set is at the bedside? Yes    Extra trach at bedside? Yes   Extra trach sizes at bedside? 8   Vent Select   Conventional Vent Y   Charged w/in last 24h YES   Preset Conventional Ventilator Settings   Vent ID [id number]   Vent Type    Ventilation Type VC   Vent Mode A/C   Set Rate 16 BPM   Vt Set 450 mL   PEEP/CPAP 5 cmH20   Peak Flow 60 L/min   Peak End Inspiratory Pressure 37 cmH20   I-Trigger Type  V-TRIG   Trigger Sensitivity Flow/I-Trigger 2 L/min   Patient Ventilator Parameters   Resp Rate Total 16 br/min   Peak Airway Pressure 39 cmH2O   Mean Airway Pressure 12 cmH20   Plateau Pressure 0 cmH20   Exhaled Vt 431 mL   Total Ve 6.84 mL   I:E Ratio Measured 1:3.60   Auto PEEP 0 cmH20   Conventional Ventilator Alarms   Alarms On Y   Resp Rate High Alarm 40 br/min   Press High Alarm 50 cmH2O   Apnea Rate 16   Apnea Volume (mL) 0 mL   Apnea Oxygen Concentration  100   Apnea Flow Rate (L/min) 60   T Apnea 20 sec(s)   Ready to Wean/Extubation Screen   FIO2<=50 (chart decimal) 0.35   MV<16L (chart vol.) 6.84   PEEP <=8 (chart #) 5   Ready to Wean Parameters   F/VT Ratio<105 (RSBI) (!) 37.12   Vital Capacity (mL) 0

## 2022-08-02 ENCOUNTER — TELEPHONE (OUTPATIENT)
Dept: SURGERY | Facility: CLINIC | Age: 62
End: 2022-08-02
Payer: MEDICARE

## 2022-08-02 LAB
ABO + RH BLD: NORMAL
ALBUMIN SERPL BCP-MCNC: 2.6 G/DL (ref 3.5–5.2)
ALP SERPL-CCNC: 166 U/L (ref 55–135)
ALT SERPL W/O P-5'-P-CCNC: 50 U/L (ref 10–44)
ANION GAP SERPL CALC-SCNC: 4 MMOL/L (ref 8–16)
AST SERPL-CCNC: 34 U/L (ref 10–40)
BILIRUB SERPL-MCNC: 0.9 MG/DL (ref 0.1–1)
BLD GP AB SCN CELLS X3 SERPL QL: NORMAL
BLD PROD TYP BPU: NORMAL
BLOOD UNIT EXPIRATION DATE: NORMAL
BLOOD UNIT TYPE CODE: 5100
BLOOD UNIT TYPE: NORMAL
BUN SERPL-MCNC: 12 MG/DL (ref 6–20)
CALCIUM SERPL-MCNC: 8.5 MG/DL (ref 8.7–10.5)
CHLORIDE SERPL-SCNC: 104 MMOL/L (ref 95–110)
CO2 SERPL-SCNC: 25 MMOL/L (ref 23–29)
CODING SYSTEM: NORMAL
CREAT SERPL-MCNC: <0.3 MG/DL (ref 0.5–1.4)
DISPENSE STATUS: NORMAL
ERYTHROCYTE [DISTWIDTH] IN BLOOD BY AUTOMATED COUNT: 18.5 % (ref 11.5–14.5)
ERYTHROCYTE [DISTWIDTH] IN BLOOD BY AUTOMATED COUNT: 18.6 % (ref 11.5–14.5)
EST. GFR  (NO RACE VARIABLE): >60 ML/MIN/1.73 M^2
GLUCOSE SERPL-MCNC: 105 MG/DL (ref 70–110)
HCT VFR BLD AUTO: 23.3 % (ref 37–48.5)
HCT VFR BLD AUTO: 24.1 % (ref 37–48.5)
HGB BLD-MCNC: 7.1 G/DL (ref 12–16)
HGB BLD-MCNC: 7.2 G/DL (ref 12–16)
MCH RBC QN AUTO: 26 PG (ref 27–31)
MCH RBC QN AUTO: 26.3 PG (ref 27–31)
MCHC RBC AUTO-ENTMCNC: 29.9 G/DL (ref 32–36)
MCHC RBC AUTO-ENTMCNC: 30.5 G/DL (ref 32–36)
MCV RBC AUTO: 86 FL (ref 82–98)
MCV RBC AUTO: 87 FL (ref 82–98)
NUM UNITS TRANS PACKED RBC: NORMAL
PLATELET # BLD AUTO: 203 K/UL (ref 150–450)
PLATELET # BLD AUTO: 209 K/UL (ref 150–450)
PMV BLD AUTO: 10.8 FL (ref 9.2–12.9)
PMV BLD AUTO: 11.2 FL (ref 9.2–12.9)
POTASSIUM SERPL-SCNC: 3.1 MMOL/L (ref 3.5–5.1)
POTASSIUM SERPL-SCNC: 4.2 MMOL/L (ref 3.5–5.1)
PROT SERPL-MCNC: 7.3 G/DL (ref 6–8.4)
RBC # BLD AUTO: 2.7 M/UL (ref 4–5.4)
RBC # BLD AUTO: 2.77 M/UL (ref 4–5.4)
SODIUM SERPL-SCNC: 133 MMOL/L (ref 136–145)
WBC # BLD AUTO: 3.58 K/UL (ref 3.9–12.7)
WBC # BLD AUTO: 3.8 K/UL (ref 3.9–12.7)

## 2022-08-02 PROCEDURE — 99233 SBSQ HOSP IP/OBS HIGH 50: CPT | Mod: ,,, | Performed by: INTERNAL MEDICINE

## 2022-08-02 PROCEDURE — 86850 RBC ANTIBODY SCREEN: CPT | Performed by: INTERNAL MEDICINE

## 2022-08-02 PROCEDURE — 94761 N-INVAS EAR/PLS OXIMETRY MLT: CPT

## 2022-08-02 PROCEDURE — 20000000 HC ICU ROOM

## 2022-08-02 PROCEDURE — 85027 COMPLETE CBC AUTOMATED: CPT | Mod: 91 | Performed by: INTERNAL MEDICINE

## 2022-08-02 PROCEDURE — 63600175 PHARM REV CODE 636 W HCPCS: Mod: JG | Performed by: STUDENT IN AN ORGANIZED HEALTH CARE EDUCATION/TRAINING PROGRAM

## 2022-08-02 PROCEDURE — 84132 ASSAY OF SERUM POTASSIUM: CPT | Performed by: INTERNAL MEDICINE

## 2022-08-02 PROCEDURE — 25000003 PHARM REV CODE 250

## 2022-08-02 PROCEDURE — 99233 PR SUBSEQUENT HOSPITAL CARE,LEVL III: ICD-10-PCS | Mod: ,,, | Performed by: STUDENT IN AN ORGANIZED HEALTH CARE EDUCATION/TRAINING PROGRAM

## 2022-08-02 PROCEDURE — 86920 COMPATIBILITY TEST SPIN: CPT | Performed by: INTERNAL MEDICINE

## 2022-08-02 PROCEDURE — P9016 RBC LEUKOCYTES REDUCED: HCPCS | Performed by: INTERNAL MEDICINE

## 2022-08-02 PROCEDURE — 99900031 HC PATIENT EDUCATION (STAT)

## 2022-08-02 PROCEDURE — 63600175 PHARM REV CODE 636 W HCPCS: Performed by: INTERNAL MEDICINE

## 2022-08-02 PROCEDURE — 99233 PR SUBSEQUENT HOSPITAL CARE,LEVL III: ICD-10-PCS | Mod: ,,, | Performed by: INTERNAL MEDICINE

## 2022-08-02 PROCEDURE — 94003 VENT MGMT INPAT SUBQ DAY: CPT

## 2022-08-02 PROCEDURE — 99900035 HC TECH TIME PER 15 MIN (STAT)

## 2022-08-02 PROCEDURE — 85027 COMPLETE CBC AUTOMATED: CPT | Performed by: INTERNAL MEDICINE

## 2022-08-02 PROCEDURE — 94640 AIRWAY INHALATION TREATMENT: CPT

## 2022-08-02 PROCEDURE — 99233 SBSQ HOSP IP/OBS HIGH 50: CPT | Mod: ,,, | Performed by: STUDENT IN AN ORGANIZED HEALTH CARE EDUCATION/TRAINING PROGRAM

## 2022-08-02 PROCEDURE — 25000003 PHARM REV CODE 250: Performed by: STUDENT IN AN ORGANIZED HEALTH CARE EDUCATION/TRAINING PROGRAM

## 2022-08-02 PROCEDURE — 99900026 HC AIRWAY MAINTENANCE (STAT)

## 2022-08-02 PROCEDURE — 25000003 PHARM REV CODE 250: Performed by: INTERNAL MEDICINE

## 2022-08-02 PROCEDURE — 80053 COMPREHEN METABOLIC PANEL: CPT | Performed by: INTERNAL MEDICINE

## 2022-08-02 PROCEDURE — 27000221 HC OXYGEN, UP TO 24 HOURS

## 2022-08-02 PROCEDURE — 36430 TRANSFUSION BLD/BLD COMPNT: CPT

## 2022-08-02 PROCEDURE — 25000242 PHARM REV CODE 250 ALT 637 W/ HCPCS: Performed by: INTERNAL MEDICINE

## 2022-08-02 RX ORDER — ACETYLCYSTEINE 200 MG/ML
4 SOLUTION ORAL; RESPIRATORY (INHALATION)
Status: DISCONTINUED | OUTPATIENT
Start: 2022-08-03 | End: 2022-08-05 | Stop reason: HOSPADM

## 2022-08-02 RX ORDER — HYDROCODONE BITARTRATE AND ACETAMINOPHEN 500; 5 MG/1; MG/1
TABLET ORAL
Status: DISCONTINUED | OUTPATIENT
Start: 2022-08-02 | End: 2022-08-05

## 2022-08-02 RX ORDER — IPRATROPIUM BROMIDE AND ALBUTEROL SULFATE 2.5; .5 MG/3ML; MG/3ML
3 SOLUTION RESPIRATORY (INHALATION)
Status: DISCONTINUED | OUTPATIENT
Start: 2022-08-03 | End: 2022-08-05 | Stop reason: HOSPADM

## 2022-08-02 RX ADMIN — CARBOXYMETHYLCELLULOSE SODIUM 1 DROP: 0.5 SOLUTION, GEL FORMING / DROPS OPHTHALMIC at 09:08

## 2022-08-02 RX ADMIN — IPRATROPIUM BROMIDE AND ALBUTEROL SULFATE 3 ML: .5; 3 SOLUTION RESPIRATORY (INHALATION) at 01:08

## 2022-08-02 RX ADMIN — MIDODRINE HYDROCHLORIDE 10 MG: 2.5 TABLET ORAL at 03:08

## 2022-08-02 RX ADMIN — OXYBUTYNIN CHLORIDE 5 MG: 5 TABLET ORAL at 09:08

## 2022-08-02 RX ADMIN — CARBOXYMETHYLCELLULOSE SODIUM 1 DROP: 0.5 SOLUTION, GEL FORMING / DROPS OPHTHALMIC at 08:08

## 2022-08-02 RX ADMIN — FAMOTIDINE 20 MG: 20 TABLET ORAL at 08:08

## 2022-08-02 RX ADMIN — MUPIROCIN 1 G: 20 OINTMENT TOPICAL at 08:08

## 2022-08-02 RX ADMIN — CARBOXYMETHYLCELLULOSE SODIUM 1 DROP: 0.5 SOLUTION, GEL FORMING / DROPS OPHTHALMIC at 02:08

## 2022-08-02 RX ADMIN — CEFTOLOZANE AND TAZOBACTAM 1500 MG: 1; .5 INJECTION, POWDER, LYOPHILIZED, FOR SOLUTION INTRAVENOUS at 02:08

## 2022-08-02 RX ADMIN — MIDODRINE HYDROCHLORIDE 10 MG: 2.5 TABLET ORAL at 09:08

## 2022-08-02 RX ADMIN — ACETYLCYSTEINE 4 ML: 200 SOLUTION ORAL; RESPIRATORY (INHALATION) at 01:08

## 2022-08-02 RX ADMIN — CIPROFLOXACIN 400 MG: 2 INJECTION, SOLUTION INTRAVENOUS at 03:08

## 2022-08-02 RX ADMIN — IPRATROPIUM BROMIDE AND ALBUTEROL SULFATE 3 ML: .5; 3 SOLUTION RESPIRATORY (INHALATION) at 07:08

## 2022-08-02 RX ADMIN — OXYBUTYNIN CHLORIDE 5 MG: 5 TABLET ORAL at 08:08

## 2022-08-02 RX ADMIN — MUPIROCIN 1 G: 20 OINTMENT TOPICAL at 09:08

## 2022-08-02 RX ADMIN — ENOXAPARIN SODIUM 40 MG: 40 INJECTION SUBCUTANEOUS at 08:08

## 2022-08-02 RX ADMIN — CEFTOLOZANE AND TAZOBACTAM 1500 MG: 1; .5 INJECTION, POWDER, LYOPHILIZED, FOR SOLUTION INTRAVENOUS at 09:08

## 2022-08-02 RX ADMIN — CEFTOLOZANE AND TAZOBACTAM 1500 MG: 1; .5 INJECTION, POWDER, LYOPHILIZED, FOR SOLUTION INTRAVENOUS at 06:08

## 2022-08-02 RX ADMIN — ACETYLCYSTEINE 4 ML: 200 SOLUTION ORAL; RESPIRATORY (INHALATION) at 07:08

## 2022-08-02 RX ADMIN — CARBOXYMETHYLCELLULOSE SODIUM 1 DROP: 0.5 SOLUTION, GEL FORMING / DROPS OPHTHALMIC at 04:08

## 2022-08-02 RX ADMIN — CHLORHEXIDINE GLUCONATE 15 ML: 1.2 RINSE ORAL at 08:08

## 2022-08-02 RX ADMIN — POTASSIUM BICARBONATE 60 MEQ: 391 TABLET, EFFERVESCENT ORAL at 08:08

## 2022-08-02 RX ADMIN — MIDODRINE HYDROCHLORIDE 10 MG: 2.5 TABLET ORAL at 08:08

## 2022-08-02 RX ADMIN — FAMOTIDINE 20 MG: 20 TABLET ORAL at 09:08

## 2022-08-02 RX ADMIN — CHLORHEXIDINE GLUCONATE 15 ML: 1.2 RINSE ORAL at 09:08

## 2022-08-02 NOTE — CARE UPDATE
08/02/22 0718   Patient Assessment/Suction   Level of Consciousness (AVPU) responds to voice   Respiratory Effort Unlabored   Expansion/Accessory Muscles/Retractions expansion symmetric   All Lung Fields Breath Sounds Anterior:;equal bilaterally;coarse   Rhythm/Pattern, Respiratory assisted mechanically   Cough Frequency with stimulation   Cough Type assisted   Suction Method oral;tracheal   Suction Pressure (mmHg) 120 mmHg   $ Suction Charges Inline Suction Procedure Stat Charge   PRE-TX-O2   O2 Device (Oxygen Therapy) ventilator   $ Is the patient on Low Flow Oxygen? Yes   SpO2 98 %   Aerosol Therapy   $ Aerosol Therapy Charges Aerosol Treatment   Daily Review of Necessity (SVN) completed   Respiratory Treatment Status (SVN) given   Treatment Route (SVN) in-line   Patient Position (SVN) semi-Le's   Post Treatment Assessment (SVN) increased aeration   Signs of Intolerance (SVN) none   Breath Sounds Post-Respiratory Treatment   Throughout All Fields Post-Treatment All Fields   Throughout All Fields Post-Treatment Anterior:   Post-treatment Heart Rate (beats/min) 57   Post-treatment Resp Rate (breaths/min) 16        Surgical Airway Portex Cuffed   No placement date or time found.   Present Prior to Hospital Arrival?: Yes  Type: Tracheostomy  Brand: Portex  Airway Device Size: 8.0  Style: Cuffed   Cuff Inflation? Inflated   Status Secured   Site Assessment Drainage;Oozing secretions   Site Care Cleansed;Dried   Inner Cannula Care Cleansed/dried   Ties Assessment Dry;Clean;Intact   Airway Safety   Ambu bag with the patient? Yes, Adult Ambu   Is mask with the patient? Yes, Adult Mask   Suction set is at the bedside? Yes   ETT at Bedside? Yes   ETT Size 6   Extra trach at bedside? Yes   Extra trach sizes at bedside? 8   Is obturator available? No   Location of obturator?  Head of bed   Vent Select   Conventional Vent Y   Charged w/in last 24h YES   Preset Conventional Ventilator Settings   Vent ID [id number]   Vent Type     Humidity HME   Conventional Ventilator Alarms   Alarms On Y   Ve High Alarm 20 L/min   Ve Low Alarm 3 L/min   Vt High Alarm 1200 mL   Vt Low Alarm 3000 mL

## 2022-08-02 NOTE — RESPIRATORY THERAPY
08/01/22 1937   Patient Assessment/Suction   Level of Consciousness (AVPU) responds to pain   Respiratory Effort Normal;Unlabored   Expansion/Accessory Muscles/Retractions no use of accessory muscles   All Lung Fields Breath Sounds equal bilaterally;coarse   Rhythm/Pattern, Respiratory assisted mechanically   Cough Frequency with stimulation   Cough Type assisted   Suction Method tracheal   $ Suction Charges Inline Suction Procedure Stat Charge   Secretions Amount large   Secretions Color pale;yellow   Secretions Characteristics thick   Skin Integrity   $ Wound Care Tech Time 15 min   Area Observed Neck under tracheostomy   Skin Appearance without discoloration   Barrier used? Other (see comments)   Barrier Changed? Yes   PRE-TX-O2   O2 Device (Oxygen Therapy) ventilator   Oxygen Concentration (%) 30   SpO2 97 %   Pulse Oximetry Type Continuous   $ Pulse Oximetry - Multiple Charge Pulse Oximetry - Multiple   Pulse 100   Resp 17   Aerosol Therapy   $ Aerosol Therapy Charges Aerosol Treatment   Daily Review of Necessity (SVN) completed   Respiratory Treatment Status (SVN) given   Treatment Route (SVN) tracheostomy;in-line;ventilator   Patient Position (SVN) HOB elevated   Post Treatment Assessment (SVN) breath sounds improved;congestion decreased   Signs of Intolerance (SVN) none   Breath Sounds Post-Respiratory Treatment   Post-treatment Heart Rate (beats/min) 97   Post-treatment Resp Rate (breaths/min) 16        Surgical Airway Portex Cuffed   No placement date or time found.   Present Prior to Hospital Arrival?: Yes  Type: Tracheostomy  Brand: Portex  Airway Device Size: 8.0  Style: Cuffed   Cuff Inflation? Inflated   Status Secured   Site Assessment Clean;Dry   Site Care Cleansed;Dried;Dressing applied   Inner Cannula Care Cleansed/dried   Ties Assessment Clean;Dry;Intact;Secure   Airway Safety   Ambu bag with the patient? Yes, Adult Ambu   Is mask with the patient? Yes, Adult Mask   Respiratory Interventions    Airway/Ventilation Management airway patency maintained   Vent Select   Conventional Vent Y   Charged w/in last 24h YES   Preset Conventional Ventilator Settings   Vent ID [id number]   Ventilation Type VC   Vent Mode A/C   Humidity HME   Set Rate 16 BPM   Vt Set 450 mL   PEEP/CPAP 5 cmH20   Peak Flow 60 L/min   Peak End Inspiratory Pressure 44 cmH20   I-Trigger Type  V-TRIG   Trigger Sensitivity Flow/I-Trigger 2 L/min   Patient Ventilator Parameters   Resp Rate Total 24 br/min   Peak Airway Pressure 46 cmH2O   Mean Airway Pressure 14 cmH20   Plateau Pressure 0 cmH20   Exhaled Vt 53 mL   Total Ve 4.92 mL   I:E Ratio Measured 1:9.10   Auto PEEP 0 cmH20   Conventional Ventilator Alarms   Alarms On Y   Resp Rate High Alarm 40 br/min   Press High Alarm 60 cmH2O   Apnea Rate 16   Apnea Volume (mL) 0 mL   Apnea Oxygen Concentration  100   Apnea Flow Rate (L/min) 60   T Apnea 20 sec(s)   Ready to Wean/Extubation Screen   FIO2<=50 (chart decimal) 0.3   MV<16L (chart vol.) 4.92   PEEP <=8 (chart #) 5   Ready to Wean Parameters   F/VT Ratio<105 (RSBI) 320.75   Vital Capacity (mL) 0   Education   $ Education Bronchodilator;Trach Care;Suction;Ventilator Oxygen;15 min   Respiratory Evaluation   $ Care Plan Tech Time 15 min   $ Eval/Re-eval Charges Re-evaluation   Evaluation For Re-Eval 3 day

## 2022-08-02 NOTE — SUBJECTIVE & OBJECTIVE
Interval History:     Review of Systems   Unable to perform ROS: Mental status change   Objective:     Vital Signs (Most Recent):  Temp: 96.9 °F (36.1 °C) (08/01/22 1501)  Pulse: 100 (08/01/22 1937)  Resp: 17 (08/01/22 1937)  BP: (!) 97/57 (08/01/22 1816)  SpO2: 97 % (08/01/22 1937)   Vital Signs (24h Range):  Temp:  [95 °F (35 °C)-97.7 °F (36.5 °C)] 96.9 °F (36.1 °C)  Pulse:  [] 100  Resp:  [16-23] 17  SpO2:  [95 %-100 %] 97 %  BP: ()/(50-92) 97/57     Weight: 76.2 kg (167 lb 15.9 oz)  Body mass index is 32.81 kg/m².    Intake/Output Summary (Last 24 hours) at 8/1/2022 2129  Last data filed at 8/1/2022 1800  Gross per 24 hour   Intake 2645.97 ml   Output 1280 ml   Net 1365.97 ml      Physical Exam  Vitals and nursing note reviewed.   Constitutional:       Appearance: She is ill-appearing.   HENT:      Head: Atraumatic.      Right Ear: External ear normal.      Left Ear: External ear normal.      Nose: Nose normal.      Mouth/Throat:      Mouth: Mucous membranes are moist.   Neck:      Comments: Trach  Cardiovascular:      Rate and Rhythm: Normal rate.   Pulmonary:      Effort: Pulmonary effort is normal.   Abdominal:      Comments: RUQ area drain  PEG tube   Musculoskeletal:         General: No deformity.   Skin:     General: Skin is warm.   Neurological:      Mental Status: She is alert. Mental status is at baseline.      Comments: Trach and vent dependent       Significant Labs: All pertinent labs within the past 24 hours have been reviewed.  CBC:   Recent Labs   Lab 07/31/22  0407 08/01/22  0404   WBC 6.05 4.68   HGB 8.6* 8.6*   HCT 28.3* 27.6*    223     CMP:   Recent Labs   Lab 07/31/22  0407 08/01/22  0404    136   K 3.7 4.0    103   CO2 26 27    106   BUN 12 11   CREATININE <0.3* <0.3*   CALCIUM 8.6* 9.1   PROT 7.7 7.8   ALBUMIN 2.8* 2.8*   BILITOT 0.8 1.0   ALKPHOS 187* 187*   AST 31 40   ALT 49* 60*   ANIONGAP 8 6*   EGFRNONAA >60.0  --        Significant Imaging: I  have reviewed all pertinent imaging results/findings within the past 24 hours.

## 2022-08-02 NOTE — PLAN OF CARE
POC reviewed with patient. Wound care assessed wounds at bedside. 1 unit PRBC given for low hemoglobin. Vitals stable and WDL. Will continue to monitor.

## 2022-08-02 NOTE — CARE UPDATE
08/02/22 0922   Patient Assessment/Suction   Level of Consciousness (AVPU) responds to voice   Respiratory Effort Unlabored   Expansion/Accessory Muscles/Retractions expansion symmetric   All Lung Fields Breath Sounds Anterior:;equal bilaterally;coarse   Cough Type assisted   PRE-TX-O2   O2 Device (Oxygen Therapy) ventilator   $ Is the patient on Low Flow Oxygen? Yes   Oxygen Concentration (%) 25   SpO2 97 %   Pulse Oximetry Type Continuous   Pulse 80   Resp 16   Vent Select   Conventional Vent Y   $ Ventilator Subsequent 1   Charged w/in last 24h YES   Preset Conventional Ventilator Settings   Vent ID [id number]   Vent Type    Ventilation Type VC   Vent Mode A/C   Humidity HME   Set Rate 16 BPM   Vt Set 450 mL   PEEP/CPAP 5 cmH20   Peak Flow 60 L/min   Peak End Inspiratory Pressure 36 cmH20   I-Trigger Type  V-TRIG   Trigger Sensitivity Flow/I-Trigger 2 L/min   Patient Ventilator Parameters   Resp Rate Total 16 br/min   Peak Airway Pressure 38 cmH2O   Mean Airway Pressure 12 cmH20   Plateau Pressure 0 cmH20   Exhaled Vt 466 mL   Total Ve 7.5 mL   I:E Ratio Measured 1:3.60   Auto PEEP 0 cmH20   Conventional Ventilator Alarms   Alarms On Y   Ve High Alarm 20 L/min   Ve Low Alarm 3 L/min   Vt High Alarm 1200 mL   Vt Low Alarm 3000 mL   Resp Rate High Alarm 40 br/min   Press High Alarm 60 cmH2O   Apnea Rate 16   Apnea Volume (mL) 0 mL   Apnea Oxygen Concentration  100   Apnea Flow Rate (L/min) 60   T Apnea 20 sec(s)   Ready to Wean/Extubation Screen   FIO2<=50 (chart decimal) 0.25   MV<16L (chart vol.) 7.5   PEEP <=8 (chart #) 5   Ready to Wean Parameters   F/VT Ratio<105 (RSBI) (!) 34.33   Vital Capacity (mL) 0

## 2022-08-02 NOTE — PROGRESS NOTES
Consult Note  Infectious Disease    Reason for Consult: MDR pathogens    HPI: Genna Felix is a 61 y.o. female known to our service for prior hospital admissions to [facility], last one 3/20 for sepsis secondary to Enterococcus bacteremia and MDRO pneumonia, and prior on 1/12/22 for acute cholecystitis s/p IR drainage 1/13, resident of [facility], with past medical history of anoxic brain injury status post tracheostomy/peg tube, prior PE, hypertension, and GERD, sent from NH for possible aspiration pneumonia, blood noted in tube feeding after suctioning her tracheostomy. History is very limited; patient is non-verbal at baseline, no family present.      In the ER, hypotensive, afebrile  Labs labs on admission, normal white count, no left shift, H&H 11/36.2, normal platelet count 185  Cr <0.3, patient with muscle wasting   Albumin 3.5 , AST 31/ALT 49  Procal  <0.05, 0.35, negative x 2  Lactic acid 1.8  UA cloudy, 3+ occult blood, 3+ protein, greater than 100 RBC and WBC, 3+ leukocytes, rare bacteria, pending culture   CXR: RLL infiltrate suggestive of aspiration, seen on prior images from last admission in March 2022     Admitted to ICU for septic shock likely secondary to aspiration pneumonia and UTI.     Empirically started on Vancomycin and meropenem.     ID consult for antibiotic recommendations.     INTERVAL HISTORY:  8/1:  Interim reviewed, patient seen and examined at bedside.  Pressors just turned off, hemodynamically stable, afebrile, on bear hugger.  FiO2 requirements came down to 30%, still copious amount of purulent secretions from tracheostomy site.  She is staring at the wall, non-verbal from anoxic brain injury. Adequate urinary output, had a bowel movement normal overnight.  Labs reviewed, stable white count, H&H 8.6/27.6, platelet count 223. Creatinine less than 0.3, generalized muscle wasting, normal electrolytes, AST 40/ALT 60. Micro lab contacted, will send respiratory culture for  ceftazidime/tazobactam sensitivity.     8/2:  Patient seen and examined at bedside, off pressors since yesterday, FiO2 requirement decreased to 25%, secretions slightly less amount still purulent.  Soft blood pressure, afebrile.  Labs reviewed, leukopenia 3.5, no differential, H&H 7.2/24.1, platelet count 209.  Hyponatremia 133 , creatinine less than 0.3, extensive muscle wasting, AST 34 normal/ALT 50, trending down. Micro reviewed, Zerbaxa sensitivities pending.     Review of patient's allergies indicates:  No Known Allergies  Past Medical History:   Diagnosis Date    Anoxic brain damage 07/2020    Bedbound 07/2020    Cardiac arrest 07/2020    Cirrhosis     GERD (gastroesophageal reflux disease)     Hemangioma of intra-abdominal structure     Hypertension     Nursing home resident     Protein calorie malnutrition     Pulmonary embolus     Respiratory distress     S/P percutaneous endoscopic gastrostomy (PEG) tube placement 07/2020    Total self-care deficit 07/2020    Tracheostomy dependence     7/2020     Past Surgical History:   Procedure Laterality Date    PEG W/TRACHEOSTOMY PLACEMENT  07/27/2020    SKIN GRAFT      buttocks     Social History     Tobacco Use    Smoking status: Never Smoker    Smokeless tobacco: Never Used   Substance Use Topics    Alcohol use: Not Currently        Family History   Problem Relation Age of Onset    No Known Problems Mother     No Known Problems Father          Review of Systems:   Unable to obtain, patient is non-verbal, no family at bedside.     Outdoor activities:  Travel:   Implants:   Antibiotic History:     EXAM & DIAGNOSTICS REVIEWED:   Vitals:     Temp:  [95.5 °F (35.3 °C)-99.8 °F (37.7 °C)]   Temp: 96 °F (35.6 °C) (08/02/22 0701)  Pulse: 79 (08/02/22 1111)  Resp: 16 (08/02/22 1111)  BP: (!) 109/53 (08/02/22 1001)  SpO2: 96 % (08/02/22 1111)    Intake/Output Summary (Last 24 hours) at 8/2/2022 1124  Last data filed at 8/2/2022 0600  Gross per 24 hour    Intake 1158.11 ml   Output 980 ml   Net 178.11 ml       General:         Chronically ill-appearing, trach to vent FiO2 25%, awake, not following commands  Eyes:               Hyperemic conjunctiva both eyes, cataract L>R both eyes, anicteric  ENT:                Contracted, moist mucous membranes, no thrush, edentulous    Neck:              Supple  Lungs:            Better air entry b/l, trace rales R base  Heart:              S1/S2+, regular rhythm, no murmurs  Abd:                RUQ CLARICE drain in place since 1/13/22 very bilious fluid                          PEG tube in place, no evidence of redness or purulent drainage from ostomy, +BS, soft, non tender, less distended  :                 Hamilton, urine yellow  Musc:              Flexure contractures on hands  Skin:                Warm, no rash   Wound:           Right elbow, sacrum better compared to last admission in March  Neuro:   Not following commands  Psych:            Unable to perform   Lymphatic:      Extrem:           No LE edema b/l - contracted  VAD:               R subclavian 7/30--> to be removed    Peripheral IVs                                                   Isolation:        Contact        General Labs reviewed:  Recent Labs   Lab 08/01/22  0404 08/02/22  0339 08/02/22  0536   WBC 4.68 3.80* 3.58*   HGB 8.6* 7.1* 7.2*   HCT 27.6* 23.3* 24.1*    203 209       Recent Labs   Lab 07/31/22  0407 08/01/22  0404 08/02/22  0339    136 133*   K 3.7 4.0 3.1*    103 104   CO2 26 27 25   BUN 12 11 12   CREATININE <0.3* <0.3* <0.3*   CALCIUM 8.6* 9.1 8.5*   PROT 7.7 7.8 7.3   BILITOT 0.8 1.0 0.9   ALKPHOS 187* 187* 166*   ALT 49* 60* 50*   AST 31 40 34     No results for input(s): CRP in the last 168 hours.  No results for input(s): SEDRATE in the last 168 hours.    CrCl cannot be calculated (This lab value cannot be used to calculate CrCl because it is not a number: <0.3).     Micro:  Microbiology Results (last 7 days)       Procedure  Component Value Units Date/Time    Blood culture #1 **CANNOT BE ORDERED STAT** [[id number]] Collected: 07/29/22 0320    Order Status: Completed Specimen: Blood from Peripheral, Hand, Right Updated: 08/02/22 0432     Blood Culture, Routine No Growth to date      No Growth to date      No Growth to date      No Growth to date      No Growth to date    Blood culture #2 **CANNOT BE ORDERED STAT** [[id number]] Collected: 07/29/22 0345    Order Status: Completed Specimen: Blood from Peripheral, Forearm, Right Updated: 08/02/22 0432     Blood Culture, Routine No Growth to date      No Growth to date      No Growth to date      No Growth to date      No Growth to date    Culture, Respiratory with Gram Stain [[id number]]  (Abnormal)  (Susceptibility) Collected: 07/29/22 0306    Order Status: Completed Specimen: Respiratory from Tracheal Aspirate Updated: 08/01/22 1228     Respiratory Culture PSEUDOMONAS AERUGINOSA  Moderate        PROTEUS MIRABILIS ESBL  Moderate  Results called to and read back by RN M3 ANKIT HOLDSWORTH  07/31/2022    09:37 JT       Comment: Previous value was PPR verified by [email or web address] at 08:37 on 07/30/2022  Previous comment was modified by MERRYT at 06:29 on 07/31/2022   Identification and susceptibility pending          Gram Stain (Respiratory) Many WBC's     Gram Stain (Respiratory) <10 epithelial cells per low power field.     Gram Stain (Respiratory) Few Gram negative rods    Urine culture [[id number]] Collected: 07/29/22 0545    Order Status: Completed Specimen: Urine Updated: 07/30/22 0751     Urine Culture, Routine Multiple organisms isolated. None in predominance.  Repeat if      clinically necessary.    Narrative:      Specimen Source->Urine    Urine Culture High Risk [[id number]]     Order Status: Completed Specimen: Urine, Catheterized     Urine Culture High Risk [[id number]]     Order Status: Canceled Specimen: Urine, Catheterized             Imaging Reviewed:  CXRs - latest : Improvement of the  interstitial opacities suggestive of resolving edema or pneumonia  CT abdomen/pelvis:   Chest:   1. Persistent right basilar consolidations which could be due to atelectasis and/or pneumonia.   2. Bilateral upper and lower lobe tree-in-bud nodularities which could be infectious or inflammatory.   3. Chronic findings as described above.   4. A 1.2 cm spiculated nodule in the superior segment of the right upper lobe posteriorly. Consider a non-contrast Chest CT at 3 months, a PET/CT, or tissue sampling. These guidelines do not apply to immunocompromised patients and patients with cancer. Follow up in patients with significant comorbidities as clinically warranted. For lung cancer screening, adhere to Lung-RADS guidelines. Reference: Radiology. 2017; 284(1):228-43.     Abdomen of and pelvis:   1. Cholecystostomy pigtail catheter with multiple gallstones, unchanged.   2. Additional chronic findings as described above.     Cardiology BRENDAN 3/23/22:  · The left ventricle is normal in size with normal systolic function.  · The estimated ejection fraction is 60%.  · Normal left ventricular diastolic function.  · Normal right ventricular size with normal right ventricular systolic function.  · No interatrial septal defect present.  · Normal appearing left atrial appendage. No thrombus is present in the appendage. Normal appendage velocities.  · There is no pulmonary hypertension.  · Grade 2 plaque present in the transverse aorta and descending aorta.     No valvular vegetations seen no abnormal intracavitary echoes  Descending aorta and transverse aorta have moderate amount of atheroma seen         IMPRESSION & PLAN     1. Septic shock resolved, likely secondary to multifocal pna R>L, in the setting off chronic aspiration pna R lung, resident of NH, oxygen requirements decreased to 25%   CXR improved   Procal negative x 2   Respiratory culture 7/27 grew, Proteus mirabilis ESBL sensitive to quinolones and carbapenems, and   Pseudomonas aeruginosa sensitive to Zosyn SOPHIA <16   Blood cultures x 2 no growth to date, pending final    Urine culture multiple organisms    2. PMHx cardiac arrest, anoxic brain injury, bed-bound, cirrhosis, GERD, nursing home resident, PE, tracheostomy and PEG since July 2020.     Recommendations:  Continue Zerbaxa 1.5g IV q8h for  Pseudomonas   Cipro 400mg IV q8h for ESBL Proteus   Awaiting Zerbaxa sensitivities  Pulmonary toilet  Aspiration precautions   Wound care    Will follow     D/w nursing, Dr Andrews, Dr Finn    Medical Decision Making during this encounter was  [_] Low Complexity  [_] Moderate Complexity  [xx] High Complexity

## 2022-08-02 NOTE — NURSING
61 yr old female trach and vented   Scarring noted to the inner right perirectal area     Right elbow  Stage 4 healing with thick yellow bio build   Area cleaned and pat dry  Applied medihoney and covered with foam and tape       Recommendation:  Right elbow Clean with chlorhexidine/ns.  Pat dry. Apply Medihoney and cover with mepilex.   Turn reposition q2 as pt's condition will allow.  Bilat heel pillows   bilat elbow pillows   Float and elevate heels of bed with pillows.  air mattress

## 2022-08-02 NOTE — PROGRESS NOTES
Pulmonary/Critical Care  Progress Note      Patient name: Genna Felix  MRN: [id number]  Date: 08/02/2022    Admit Date: 7/29/2022  Consult Requested By: Tee Finn MD    Reason for Consult: Respiratory failure, chronic, aspiration    HPI:    7/29/2022 - 62 yo with h/o GERD, HTN, PE, trach, vent, anoxic brain injury sent to ER from [place] for coughing and possible aspiration.  In ER was hypothermic, procalcitonin was low, UA showed UTI.  CT chest reviewed and shows chronic changes, a nodule and some inflammatory changes.  Has QTc prolongation.  I ICU was hypotensive despite fluid bolus and pressors are being started.  I d./w her sister and updated her and attempted to call her daughter to discuss code status but no answer.    8/1/2022 - Events of weekend noted, no distress.  Remain unresponsive.  Has a lot of purulent secretions from trach.  Nursing reports that family has not visited.    8/2/2022 - Stable overnight, still with episodes of hypothermia.  No new respiratory issues.  O2 needs have decreased.  H/H has decreased.  CXR is better - ? If she had a component of pulmonary edema    Review of Systems    Review of Systems   Unable to perform ROS: Mental acuity       Past Medical History    Past Medical History:   Diagnosis Date    Anoxic brain damage 07/2020    Bedbound 07/2020    Cardiac arrest 07/2020    Cirrhosis     GERD (gastroesophageal reflux disease)     Hemangioma of intra-abdominal structure     Hypertension     Nursing home resident     Protein calorie malnutrition     Pulmonary embolus     Respiratory distress     S/P percutaneous endoscopic gastrostomy (PEG) tube placement 07/2020    Total self-care deficit 07/2020    Tracheostomy dependence     7/2020       Past Surgical History    Past Surgical History:   Procedure Laterality Date    PEG W/TRACHEOSTOMY PLACEMENT  07/27/2020    SKIN GRAFT      buttocks       Medications (scheduled):      acetylcysteine 200 mg/ml (20%)  4 mL  Nebulization Q6H    albuterol-ipratropium  3 mL Nebulization Q6H    carboxymethylcellulose  1 drop Ophthalmic QID    ceftolozane-tazobactam  1,500 mg Intravenous Q8H    chlorhexidine  15 mL Mouth/Throat BID    ciprofloxacin  400 mg Intravenous Q12H    enoxparin  40 mg Subcutaneous Q24H    famotidine  20 mg Per G Tube BID    midodrine  10 mg Per G Tube TID    mupirocin   Nasal BID    oxybutynin  5 mg Oral BID       Medications (infusions):      sodium chloride 0.9% 50 mL/hr at 08/01/22 1800    NORepinephrine bitartrate-D5W Stopped (08/01/22 1140)       Medications (prn):     acetaminophen, carboxymethylcellulose sodium, dextrose 10%, dextrose 10%, magnesium oxide, magnesium oxide, potassium bicarbonate, potassium bicarbonate, potassium bicarbonate, potassium, sodium phosphates, potassium, sodium phosphates, potassium, sodium phosphates    Family History:   Family History   Problem Relation Age of Onset    No Known Problems Mother     No Known Problems Father        Social History: Tobacco:   Social History     Tobacco Use   Smoking Status Never Smoker   Smokeless Tobacco Never Used                                EtOH:   Social History     Substance and Sexual Activity   Alcohol Use Not Currently                                Drugs:   Social History     Substance and Sexual Activity   Drug Use Not Currently         Physical Exam    Vital signs:  Temp:  [95.5 °F (35.3 °C)-99.8 °F (37.7 °C)]   Pulse:  []   Resp:  [9-25]   BP: ()/(50-72)   SpO2:  [92 %-100 %]     Intake/Output:     Intake/Output Summary (Last 24 hours) at 8/2/2022 1521  Last data filed at 8/2/2022 0714  Gross per 24 hour   Intake 1149.17 ml   Output 980 ml   Net 169.17 ml        BMI: Estimated body mass index is 32.98 kg/m² as calculated from the following:    Height as of this encounter: 5' (1.524 m).    Weight as of this encounter: 76.6 kg (168 lb 14 oz).    Physical Exam  Vitals and nursing note reviewed.   Constitutional:        General: She is not in acute distress.     Appearance: She is ill-appearing (chronically). She is not toxic-appearing or diaphoretic.      Comments: Eyes open, no response and does not fix on objects   HENT:      Head: Normocephalic and atraumatic.      Right Ear: External ear normal.      Left Ear: External ear normal.      Nose: Nose normal. No congestion or rhinorrhea.      Mouth/Throat:      Mouth: Mucous membranes are moist.      Pharynx: Oropharynx is clear. No oropharyngeal exudate or posterior oropharyngeal erythema.   Eyes:      General: No scleral icterus.        Right eye: No discharge.         Left eye: No discharge.      Extraocular Movements: Extraocular movements intact.      Conjunctiva/sclera: Conjunctivae normal.      Pupils: Pupils are equal, round, and reactive to light.      Comments: scleritis   Neck:      Vascular: No carotid bruit.      Comments: Trach   Cardiovascular:      Rate and Rhythm: Normal rate and regular rhythm.      Pulses: Normal pulses.      Heart sounds: No murmur heard.    No friction rub. No gallop.   Pulmonary:      Effort: Pulmonary effort is normal. No respiratory distress.      Breath sounds: No stridor. Rhonchi present. No wheezing or rales.      Comments: Ventilated   Chest:      Chest wall: No tenderness.   Abdominal:      General: There is no distension.      Tenderness: There is no abdominal tenderness. There is no guarding.      Comments: PEG  Biliary drain in place  Hypoactive BS   Genitourinary:     Comments: carroll  Musculoskeletal:         General: No swelling. Normal range of motion.      Cervical back: Normal range of motion and neck supple. No rigidity or tenderness.      Right lower leg: No edema.      Left lower leg: No edema.      Comments: + contractures   Lymphadenopathy:      Cervical: No cervical adenopathy.   Skin:     General: Skin is warm and dry.      Coloration: Skin is not jaundiced.      Findings: No bruising.   Neurological:      Comments:  Unresponsive  Chronic changes  No acute changes reported   Psychiatric:      Comments: Calm          Laboratory    Recent Labs   Lab 08/02/22  0536   WBC 3.58*   RBC 2.77*   HGB 7.2*   HCT 24.1*      MCV 87   MCH 26.0*   MCHC 29.9*       Recent Labs   Lab 08/02/22  0339 08/02/22  1403   CALCIUM 8.5*  --    PROT 7.3  --    *  --    K 3.1* 4.2   CO2 25  --      --    BUN 12  --    CREATININE <0.3*  --    ALKPHOS 166*  --    ALT 50*  --    AST 34  --    BILITOT 0.9  --        No results for input(s): PT, INR, APTT in the last 24 hours.    No results for input(s): CPK, CPKMB, TROPONINI, MB in the last 24 hours.    Additional labs: reviewed    Microbiology:       Microbiology Results (last 7 days)     Procedure Component Value Units Date/Time    Blood culture #1 **CANNOT BE ORDERED STAT** [[id number]] Collected: 07/29/22 0320    Order Status: Completed Specimen: Blood from Peripheral, Hand, Right Updated: 08/02/22 0432     Blood Culture, Routine No Growth to date      No Growth to date      No Growth to date      No Growth to date      No Growth to date    Blood culture #2 **CANNOT BE ORDERED STAT** [[id number]] Collected: 07/29/22 0345    Order Status: Completed Specimen: Blood from Peripheral, Forearm, Right Updated: 08/02/22 0432     Blood Culture, Routine No Growth to date      No Growth to date      No Growth to date      No Growth to date      No Growth to date    Culture, Respiratory with Gram Stain [[id number]]  (Abnormal)  (Susceptibility) Collected: 07/29/22 0306    Order Status: Completed Specimen: Respiratory from Tracheal Aspirate Updated: 08/01/22 1228     Respiratory Culture PSEUDOMONAS AERUGINOSA  Moderate        PROTEUS MIRABILIS ESBL  Moderate  Results called to and read back by RN M3 ANKIT HOLDSWORTH  07/31/2022    09:37 JT       Comment: Previous value was PPR verified by JACIEL at 08:37 on 07/30/2022  Previous comment was modified by JACIEL at 06:29 on 07/31/2022   Identification and  susceptibility pending          Gram Stain (Respiratory) Many WBC's     Gram Stain (Respiratory) <10 epithelial cells per low power field.     Gram Stain (Respiratory) Few Gram negative rods    Urine culture [[id number]] Collected: 07/29/22 0545    Order Status: Completed Specimen: Urine Updated: 07/30/22 0751     Urine Culture, Routine Multiple organisms isolated. None in predominance.  Repeat if      clinically necessary.    Narrative:      Specimen Source->Urine    Urine Culture High Risk [[id number]]     Order Status: Completed Specimen: Urine, Catheterized     Urine Culture High Risk [[id number]]     Order Status: Canceled Specimen: Urine, Catheterized           Radiology    X-Ray Chest 1 View  Portable chest x-ray 4:52 AM is compared to prior study dated 7/30/2022    Clinical history is pneumonia    The tracheostomy tube and right subclavian vein catheter are in stable position.  The cardiomediastinal silhouette is normal in size.    There is improvement of the interstitial opacities suggestive of resolving edema or pneumonia. There are no new infiltrates or pleural effusions.    IMPRESSION: Improvement of the interstitial opacities suggestive of resolving edema or pneumonia    Electronically signed by:  Jazmin Patton MD  8/2/2022 6:56 AM CDT Workstation: [id number]        Additional Studies    reviewed    Ventilator Information    Vent Mode: A/C  Oxygen Concentration (%):  [25-30] 25  Resp Rate Total:  [16 br/min-24 br/min] 16 br/min  Vt Set:  [450 mL] 450 mL  PEEP/CPAP:  [5 cmH20] 5 cmH20  Mean Airway Pressure:  [9.9 cmH20-15 cmH20] 12 cmH20         No results for input(s): PH, PCO2, PO2, HCO3, POCSATURATED, BE in the last 72 hours.      Impression    Active Hospital Problems    Diagnosis  POA    *Aspiration pneumonia [J69.0]  Unknown    Ventilator dependence [Z99.11]  Not Applicable    Shock, unspecified [R57.9]  Unknown    Cirrhosis of liver [K74.60]  Yes    UTI (urinary tract infection) [N39.0]   Unknown    Chronic respiratory failure with hypoxia [J96.11]  Yes    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  Added automatically from request for surgery [id number]        Resolved Hospital Problems   No resolved problems to display.       Plan    · Continue vent support - vent dependent  · IVF, pressors as needed  · Broad spectrum antibiotics  - ID following  · Contact isolation  · Nebs and mucomyst to help with secretions  · Check cortisol due to hypothermia  · ? Start to consider transfer back to [place] to complete antibiotics  · Will give 1 U PRBC    Thank you for this consult.  I will follow with you while the patient is hospitalized.          Reji Andrews MD  [facility] Pulmonary/Critical Care

## 2022-08-02 NOTE — CARE UPDATE
08/02/22 1532   Patient Assessment/Suction   Level of Consciousness (AVPU) responds to voice   Respiratory Effort Unlabored   Expansion/Accessory Muscles/Retractions expansion symmetric   All Lung Fields Breath Sounds Anterior:;coarse   Rhythm/Pattern, Respiratory assisted mechanically   Cough Frequency with stimulation   Cough Type assisted   Suction Method oral;tracheal   Suction Pressure (mmHg) -120 mmHg   PRE-TX-O2   O2 Device (Oxygen Therapy) ventilator   $ Is the patient on Low Flow Oxygen? Yes   Oxygen Concentration (%) 25   SpO2 98 %   Pulse Oximetry Type Continuous   $ Pulse Oximetry - Multiple Charge Pulse Oximetry - Multiple   Pulse 67   Resp 16   Vent Select   Conventional Vent Y   Charged w/in last 24h YES   Preset Conventional Ventilator Settings   Vent ID [id number]   Vent Type    Ventilation Type VC   Vent Mode A/C   Humidity HME   Set Rate 16 BPM   Vt Set 450 mL   PEEP/CPAP 5 cmH20   Peak Flow 60 L/min   Peak End Inspiratory Pressure 32 cmH20   I-Trigger Type  V-TRIG   Trigger Sensitivity Flow/I-Trigger 2 L/min   Patient Ventilator Parameters   Resp Rate Total 16 br/min   Peak Airway Pressure 35 cmH2O   Mean Airway Pressure 12 cmH20   Plateau Pressure 0 cmH20   Exhaled Vt 468 mL   Total Ve 7.56 mL   I:E Ratio Measured 1:3.60   Auto PEEP 0 cmH20   Conventional Ventilator Alarms   Alarms On Y   Ve High Alarm 20 L/min   Ve Low Alarm 3 L/min   Vt High Alarm 1200 mL   Vt Low Alarm 300 mL   Resp Rate High Alarm 40 br/min   Press High Alarm 60 cmH2O   Apnea Rate 16   Apnea Volume (mL) 0 mL   Apnea Oxygen Concentration  100   Apnea Flow Rate (L/min) 60   T Apnea 20 sec(s)   Ready to Wean/Extubation Screen   FIO2<=50 (chart decimal) 0.25   MV<16L (chart vol.) 7.56   PEEP <=8 (chart #) 5   Ready to Wean Parameters   F/VT Ratio<105 (RSBI) (!) 34.19   Vital Capacity (mL) 0   Education   $ Education Ventilator Oxygen;15 min   Respiratory Evaluation   $ Care Plan Tech Time 15 min

## 2022-08-02 NOTE — TELEPHONE ENCOUNTER
----- Message from Lisa Brunson sent at 8/2/2022  3:27 PM CDT -----  Type: Needs Medical Advice  Who Called:  Delon with [facility]  Symptoms (please be specific):    How long has patient had these symptoms:    Pharmacy name and phone #:    Best Call Back Number:   Additional Information: Delon is requesting a call back to schedule an appt. for a patient.

## 2022-08-02 NOTE — PROGRESS NOTES
[facility] Medicine  Progress Note    Patient Name: Genna Felix  MRN: [id number]  Patient Class: IP- Inpatient   Admission Date: 7/29/2022  Length of Stay: 3 days  Attending Physician: Figueroa Flores MD  Primary Care Provider: Primary Doctor No        Subjective:     Principal Problem:Aspiration pneumonia        HPI:  Pt got admitted from NH with aspiration pneumonia and Shock  Pt was Hypothermic in ER which resolved soon  Pt has PEG tube/Trach/Tube from gall bladder area/Urine Catheter  Family not unavailable in person or via Phone  CXR showed aspiration pneumonia and pt got admitted in ICU       Overview/Hospital Course:  07/30  Afebrile  Still on pressors  Condition better compared to yesterday    07/31  Started on Zerbaxa and Cipro  On pressors  Overall poor condition persists  Still unable to get in touch with family     08/01  Slight improvement overall  Pressors stopped around noon  BP levels stable      Interval History:     Review of Systems   Unable to perform ROS: Mental status change   Objective:     Vital Signs (Most Recent):  Temp: 96.9 °F (36.1 °C) (08/01/22 1501)  Pulse: 100 (08/01/22 1937)  Resp: 17 (08/01/22 1937)  BP: (!) 97/57 (08/01/22 1816)  SpO2: 97 % (08/01/22 1937)   Vital Signs (24h Range):  Temp:  [95 °F (35 °C)-97.7 °F (36.5 °C)] 96.9 °F (36.1 °C)  Pulse:  [] 100  Resp:  [16-23] 17  SpO2:  [95 %-100 %] 97 %  BP: ()/(50-92) 97/57     Weight: 76.2 kg (167 lb 15.9 oz)  Body mass index is 32.81 kg/m².    Intake/Output Summary (Last 24 hours) at 8/1/2022 2129  Last data filed at 8/1/2022 1800  Gross per 24 hour   Intake 2645.97 ml   Output 1280 ml   Net 1365.97 ml      Physical Exam  Vitals and nursing note reviewed.   Constitutional:       Appearance: She is ill-appearing.   HENT:      Head: Atraumatic.      Right Ear: External ear normal.      Left Ear: External ear normal.      Nose: Nose normal.      Mouth/Throat:      Mouth: Mucous membranes are moist.    Neck:      Comments: Trach  Cardiovascular:      Rate and Rhythm: Normal rate.   Pulmonary:      Effort: Pulmonary effort is normal.   Abdominal:      Comments: RUQ area drain  PEG tube   Musculoskeletal:         General: No deformity.   Skin:     General: Skin is warm.   Neurological:      Mental Status: She is alert. Mental status is at baseline.      Comments: Trach and vent dependent       Significant Labs: All pertinent labs within the past 24 hours have been reviewed.  CBC:   Recent Labs   Lab 07/31/22  0407 08/01/22  0404   WBC 6.05 4.68   HGB 8.6* 8.6*   HCT 28.3* 27.6*    223     CMP:   Recent Labs   Lab 07/31/22  0407 08/01/22  0404    136   K 3.7 4.0    103   CO2 26 27    106   BUN 12 11   CREATININE <0.3* <0.3*   CALCIUM 8.6* 9.1   PROT 7.7 7.8   ALBUMIN 2.8* 2.8*   BILITOT 0.8 1.0   ALKPHOS 187* 187*   AST 31 40   ALT 49* 60*   ANIONGAP 8 6*   EGFRNONAA >60.0  --        Significant Imaging: I have reviewed all pertinent imaging results/findings within the past 24 hours.      Assessment/Plan:      * Aspiration pneumonia  Continue present iv abx to cover aspiration and hospital acquired pneumonia(Pt lives in NH and Trach/Vent dependent  On  Zerbaxa and Cipro iv for ESBL proteus and Resistant ?Pseudomonas      Shock, unspecified  Now off pressors       Ventilator dependence  Stable  Chronic issue       UTI (urinary tract infection)  Catheter associated  Vs Contaminant  Iv abx       Cirrhosis of liver  Aware       Pressure injury of left buttock, stage 4  Aware       Chronic respiratory failure with hypoxia  Trach/Vent dependent      PEG (percutaneous endoscopic gastrostomy) status  Stable  Chronic issue       Tracheostomy dependence  Stable  Chronic issue       Persistent vegetative state  Stable  Chronic issue       Anemia of chronic disease  Stable  Chronic issue         VTE Risk Mitigation (From admission, onward)         Ordered     enoxaparin injection 40 mg  Every 24 hours  (non-standard times)         07/29/22 0806     IP VTE HIGH RISK PATIENT  Once         07/29/22 0805     Place sequential compression device  Until discontinued         07/29/22 0805                Discharge Planning   [place]: 8/3/2022     Code Status: Full Code   Is the patient medically ready for discharge?:     Reason for patient still in hospital (select all that apply): Treatment  Discharge Plan A: Return to nursing home                  Figueroa Flores MD  Department of Hospital Medicine   [facility]

## 2022-08-02 NOTE — PROGRESS NOTES
[facility]  Progress Note    Patient Name: Genna Felix  MRN: [id number]  Patient Class: IP- Inpatient   Admission Date: 7/29/2022  Length of Stay: 4 days  Attending Physician: Tee Finn MD  Primary Care Provider: Primary Doctor No        Subjective:     Principal Problem:Aspiration pneumonia        HPI:  Pt got admitted from NH with aspiration pneumonia and Shock  Pt was Hypothermic in ER which resolved soon  Pt has PEG tube/Trach/Tube from gall bladder area/Urine Catheter  Family not unavailable in person or via Phone  CXR showed aspiration pneumonia and pt got admitted in ICU       Overview/Hospital Course:  07/30  Afebrile  Still on pressors  Condition better compared to yesterday    07/31  Started on Zerbaxa and Cipro  On pressors  Overall poor condition persists  Still unable to get in touch with family     08/01  Slight improvement overall  Pressors stopped around noon  BP levels stable    8/2:  No acute events overnight.   VSS.   Afebrile.     Vitals:    08/02/22 1743 08/02/22 1746 08/02/22 1801 08/02/22 1816   BP:   110/65    Pulse:  62 61 62   Resp:  16 16 16   Temp:       TempSrc:       SpO2: 98% 98% 98% 98%   Weight:       Height:           Vitals and nursing note reviewed.   Constitutional:       Appearance: She is ill-appearing.   HENT:      Head: Atraumatic.      Right Ear: External ear normal.      Left Ear: External ear normal.      Nose: Nose normal.      Mouth/Throat:      Mouth: Mucous membranes are moist.   Neck:      Comments: Trach  Cardiovascular:      Rate and Rhythm: Normal rate.   Pulmonary:      Effort: Pulmonary effort is normal.   Abdominal:      Comments: RUQ area drain  PEG tube   Musculoskeletal:         General: No deformity.   Skin:     General: Skin is warm.   Neurological:      Mental Status: She is alert. Mental status is at baseline.      Comments: Trach and vent dependent          Assessment/Plan:      * Aspiration pneumonia  Continue  present iv abx to cover aspiration and hospital acquired pneumonia(Pt lives in NH and Trach/Vent dependent  On  Zerbaxa and Cipro iv for ESBL proteus and Resistant ?Pseudomonas      Shock, unspecified  Now off pressors       Ventilator dependence  Stable  Chronic issue       UTI (urinary tract infection)  Catheter associated  Vs Contaminant  Iv abx       Cirrhosis of liver  Aware       Pressure injury of left buttock, stage 4  Aware       Chronic respiratory failure with hypoxia  Trach/Vent dependent      PEG (percutaneous endoscopic gastrostomy) status  Stable  Chronic issue       Tracheostomy dependence  Stable  Chronic issue       Persistent vegetative state  Stable  Chronic issue       Anemia of chronic disease  Stable  Chronic issue       VTE Risk Mitigation (From admission, onward)         Ordered     enoxaparin injection 40 mg  Every 24 hours (non-standard times)         07/29/22 0806     IP VTE HIGH RISK PATIENT  Once         07/29/22 0805     Place sequential compression device  Until discontinued         07/29/22 0805                Discharge Planning   [place]: 8/3/2022     Code Status: Full Code   Is the patient medically ready for discharge?:     Reason for patient still in hospital (select all that apply): Treatment  Discharge Plan A: Return to nursing home                  Tee Finn MD  Department of Hospital Medicine   [facility]

## 2022-08-03 PROBLEM — R57.9 SHOCK, UNSPECIFIED: Status: RESOLVED | Noted: 2022-07-29 | Resolved: 2022-08-03

## 2022-08-03 LAB
ALBUMIN SERPL BCP-MCNC: 2.6 G/DL (ref 3.5–5.2)
ALP SERPL-CCNC: 177 U/L (ref 55–135)
ALT SERPL W/O P-5'-P-CCNC: 63 U/L (ref 10–44)
ANION GAP SERPL CALC-SCNC: 5 MMOL/L (ref 8–16)
AST SERPL-CCNC: 46 U/L (ref 10–40)
BACTERIA BLD CULT: NORMAL
BACTERIA BLD CULT: NORMAL
BILIRUB SERPL-MCNC: 0.7 MG/DL (ref 0.1–1)
BUN SERPL-MCNC: 13 MG/DL (ref 6–20)
CALCIUM SERPL-MCNC: 8.5 MG/DL (ref 8.7–10.5)
CHLORIDE SERPL-SCNC: 107 MMOL/L (ref 95–110)
CO2 SERPL-SCNC: 27 MMOL/L (ref 23–29)
CREAT SERPL-MCNC: 0.3 MG/DL (ref 0.5–1.4)
ERYTHROCYTE [DISTWIDTH] IN BLOOD BY AUTOMATED COUNT: 18.3 % (ref 11.5–14.5)
EST. GFR  (NO RACE VARIABLE): >60 ML/MIN/1.73 M^2
GLUCOSE SERPL-MCNC: 94 MG/DL (ref 70–110)
HCT VFR BLD AUTO: 27.5 % (ref 37–48.5)
HGB BLD-MCNC: 8.6 G/DL (ref 12–16)
MAGNESIUM SERPL-MCNC: 1.7 MG/DL (ref 1.6–2.6)
MCH RBC QN AUTO: 27 PG (ref 27–31)
MCHC RBC AUTO-ENTMCNC: 31.3 G/DL (ref 32–36)
MCV RBC AUTO: 86 FL (ref 82–98)
PLATELET # BLD AUTO: 216 K/UL (ref 150–450)
PMV BLD AUTO: 11.2 FL (ref 9.2–12.9)
POTASSIUM SERPL-SCNC: 3.8 MMOL/L (ref 3.5–5.1)
PROT SERPL-MCNC: 7.4 G/DL (ref 6–8.4)
RBC # BLD AUTO: 3.19 M/UL (ref 4–5.4)
SARS-COV-2 RDRP RESP QL NAA+PROBE: NEGATIVE
SODIUM SERPL-SCNC: 139 MMOL/L (ref 136–145)
WBC # BLD AUTO: 3.74 K/UL (ref 3.9–12.7)

## 2022-08-03 PROCEDURE — 80053 COMPREHEN METABOLIC PANEL: CPT | Performed by: INTERNAL MEDICINE

## 2022-08-03 PROCEDURE — 85027 COMPLETE CBC AUTOMATED: CPT | Performed by: INTERNAL MEDICINE

## 2022-08-03 PROCEDURE — 25000003 PHARM REV CODE 250: Performed by: STUDENT IN AN ORGANIZED HEALTH CARE EDUCATION/TRAINING PROGRAM

## 2022-08-03 PROCEDURE — 99900031 HC PATIENT EDUCATION (STAT)

## 2022-08-03 PROCEDURE — 99233 SBSQ HOSP IP/OBS HIGH 50: CPT | Mod: ,,, | Performed by: INTERNAL MEDICINE

## 2022-08-03 PROCEDURE — 25000003 PHARM REV CODE 250

## 2022-08-03 PROCEDURE — 63600175 PHARM REV CODE 636 W HCPCS: Performed by: STUDENT IN AN ORGANIZED HEALTH CARE EDUCATION/TRAINING PROGRAM

## 2022-08-03 PROCEDURE — 94640 AIRWAY INHALATION TREATMENT: CPT

## 2022-08-03 PROCEDURE — 20000000 HC ICU ROOM

## 2022-08-03 PROCEDURE — 27000221 HC OXYGEN, UP TO 24 HOURS

## 2022-08-03 PROCEDURE — 99900026 HC AIRWAY MAINTENANCE (STAT)

## 2022-08-03 PROCEDURE — 25000242 PHARM REV CODE 250 ALT 637 W/ HCPCS: Performed by: INTERNAL MEDICINE

## 2022-08-03 PROCEDURE — 94003 VENT MGMT INPAT SUBQ DAY: CPT

## 2022-08-03 PROCEDURE — 94799 UNLISTED PULMONARY SVC/PX: CPT

## 2022-08-03 PROCEDURE — 94761 N-INVAS EAR/PLS OXIMETRY MLT: CPT

## 2022-08-03 PROCEDURE — 83735 ASSAY OF MAGNESIUM: CPT | Performed by: INTERNAL MEDICINE

## 2022-08-03 PROCEDURE — 63600175 PHARM REV CODE 636 W HCPCS: Performed by: INTERNAL MEDICINE

## 2022-08-03 PROCEDURE — 25000003 PHARM REV CODE 250: Performed by: INTERNAL MEDICINE

## 2022-08-03 PROCEDURE — 99900035 HC TECH TIME PER 15 MIN (STAT)

## 2022-08-03 PROCEDURE — 99233 PR SUBSEQUENT HOSPITAL CARE,LEVL III: ICD-10-PCS | Mod: ,,, | Performed by: INTERNAL MEDICINE

## 2022-08-03 PROCEDURE — U0002 COVID-19 LAB TEST NON-CDC: HCPCS | Performed by: INTERNAL MEDICINE

## 2022-08-03 PROCEDURE — 94760 N-INVAS EAR/PLS OXIMETRY 1: CPT

## 2022-08-03 RX ORDER — CIPROFLOXACIN 500 MG/1
500 TABLET ORAL EVERY 12 HOURS
Qty: 10 TABLET | Refills: 0
Start: 2022-08-04 | End: 2022-08-03 | Stop reason: SDUPTHER

## 2022-08-03 RX ORDER — CIPROFLOXACIN 500 MG/1
500 TABLET ORAL EVERY 12 HOURS
Qty: 10 TABLET | Refills: 0
Start: 2022-08-04 | End: 2022-08-09

## 2022-08-03 RX ADMIN — CHLORHEXIDINE GLUCONATE 15 ML: 1.2 RINSE ORAL at 08:08

## 2022-08-03 RX ADMIN — IPRATROPIUM BROMIDE AND ALBUTEROL SULFATE 3 ML: .5; 3 SOLUTION RESPIRATORY (INHALATION) at 07:08

## 2022-08-03 RX ADMIN — MUPIROCIN 1 G: 20 OINTMENT TOPICAL at 09:08

## 2022-08-03 RX ADMIN — OXYBUTYNIN CHLORIDE 5 MG: 5 TABLET ORAL at 09:08

## 2022-08-03 RX ADMIN — CARBOXYMETHYLCELLULOSE SODIUM 1 DROP: 0.5 SOLUTION, GEL FORMING / DROPS OPHTHALMIC at 08:08

## 2022-08-03 RX ADMIN — MIDODRINE HYDROCHLORIDE 10 MG: 2.5 TABLET ORAL at 08:08

## 2022-08-03 RX ADMIN — CIPROFLOXACIN 400 MG: 2 INJECTION, SOLUTION INTRAVENOUS at 03:08

## 2022-08-03 RX ADMIN — POTASSIUM BICARBONATE 50 MEQ: 977.5 TABLET, EFFERVESCENT ORAL at 08:08

## 2022-08-03 RX ADMIN — MIDODRINE HYDROCHLORIDE 10 MG: 2.5 TABLET ORAL at 02:08

## 2022-08-03 RX ADMIN — FAMOTIDINE 20 MG: 20 TABLET ORAL at 08:08

## 2022-08-03 RX ADMIN — ENOXAPARIN SODIUM 40 MG: 40 INJECTION SUBCUTANEOUS at 08:08

## 2022-08-03 RX ADMIN — IPRATROPIUM BROMIDE AND ALBUTEROL SULFATE 3 ML: .5; 3 SOLUTION RESPIRATORY (INHALATION) at 08:08

## 2022-08-03 RX ADMIN — FAMOTIDINE 20 MG: 20 TABLET ORAL at 09:08

## 2022-08-03 RX ADMIN — CHLORHEXIDINE GLUCONATE 15 ML: 1.2 RINSE ORAL at 09:08

## 2022-08-03 RX ADMIN — ACETYLCYSTEINE 4 ML: 200 INHALANT RESPIRATORY (INHALATION) at 07:08

## 2022-08-03 RX ADMIN — MUPIROCIN 1 G: 20 OINTMENT TOPICAL at 08:08

## 2022-08-03 RX ADMIN — IPRATROPIUM BROMIDE AND ALBUTEROL SULFATE 3 ML: .5; 3 SOLUTION RESPIRATORY (INHALATION) at 01:08

## 2022-08-03 RX ADMIN — OXYBUTYNIN CHLORIDE 5 MG: 5 TABLET ORAL at 08:08

## 2022-08-03 RX ADMIN — ACETYLCYSTEINE 4 ML: 200 INHALANT RESPIRATORY (INHALATION) at 01:08

## 2022-08-03 RX ADMIN — Medication 800 MG: at 08:08

## 2022-08-03 RX ADMIN — CARBOXYMETHYLCELLULOSE SODIUM 1 DROP: 0.5 SOLUTION, GEL FORMING / DROPS OPHTHALMIC at 09:08

## 2022-08-03 RX ADMIN — MIDODRINE HYDROCHLORIDE 10 MG: 2.5 TABLET ORAL at 09:08

## 2022-08-03 RX ADMIN — CEFTOLOZANE AND TAZOBACTAM 1500 MG: 1; .5 INJECTION, POWDER, LYOPHILIZED, FOR SOLUTION INTRAVENOUS at 05:08

## 2022-08-03 RX ADMIN — CEFTOLOZANE AND TAZOBACTAM 1500 MG: 1; .5 INJECTION, POWDER, LYOPHILIZED, FOR SOLUTION INTRAVENOUS at 02:08

## 2022-08-03 RX ADMIN — CEFTOLOZANE AND TAZOBACTAM 1500 MG: 1; .5 INJECTION, POWDER, LYOPHILIZED, FOR SOLUTION INTRAVENOUS at 09:08

## 2022-08-03 RX ADMIN — CARBOXYMETHYLCELLULOSE SODIUM 1 DROP: 0.5 SOLUTION, GEL FORMING / DROPS OPHTHALMIC at 05:08

## 2022-08-03 RX ADMIN — CARBOXYMETHYLCELLULOSE SODIUM 1 DROP: 0.5 SOLUTION, GEL FORMING / DROPS OPHTHALMIC at 01:08

## 2022-08-03 NOTE — PROGRESS NOTES
Pulmonary/Critical Care  Progress Note      Patient name: Genna Felix  MRN: [id number]  Date: 08/03/2022    Admit Date: 7/29/2022  Consult Requested By: Tee Finn MD    Reason for Consult: Respiratory failure, chronic, aspiration    HPI:    7/29/2022 - 62 yo with h/o GERD, HTN, PE, trach, vent, anoxic brain injury sent to ER from [place] for coughing and possible aspiration.  In ER was hypothermic, procalcitonin was low, UA showed UTI.  CT chest reviewed and shows chronic changes, a nodule and some inflammatory changes.  Has QTc prolongation.  I ICU was hypotensive despite fluid bolus and pressors are being started.  I d./w her sister and updated her and attempted to call her daughter to discuss code status but no answer.    8/1/2022 - Events of weekend noted, no distress.  Remain unresponsive.  Has a lot of purulent secretions from trach.  Nursing reports that family has not visited.    8/2/2022 - Stable overnight, still with episodes of hypothermia.  No new respiratory issues.  O2 needs have decreased.  H/H has decreased.  CXR is better - ? If she had a component of pulmonary edema    8/3/2022 - Stable overnight but still with hypothermia on Bairhugger.  No new issues reported.  By report no family has visited.    Review of Systems    Review of Systems   Unable to perform ROS: Mental acuity       Past Medical History    Past Medical History:   Diagnosis Date    Anoxic brain damage 07/2020    Bedbound 07/2020    Cardiac arrest 07/2020    Cirrhosis     GERD (gastroesophageal reflux disease)     Hemangioma of intra-abdominal structure     Hypertension     Nursing home resident     Protein calorie malnutrition     Pulmonary embolus     Respiratory distress     S/P percutaneous endoscopic gastrostomy (PEG) tube placement 07/2020    Total self-care deficit 07/2020    Tracheostomy dependence     7/2020       Past Surgical History    Past Surgical History:   Procedure Laterality Date    PEG  W/TRACHEOSTOMY PLACEMENT  07/27/2020    SKIN GRAFT      buttocks       Medications (scheduled):      acetylcysteine 200 mg/ml (20%)  4 mL Nebulization Q6H WAKE    albuterol-ipratropium  3 mL Nebulization Q6H WAKE    carboxymethylcellulose  1 drop Ophthalmic QID    ceftolozane-tazobactam  1,500 mg Intravenous Q8H    chlorhexidine  15 mL Mouth/Throat BID    ciprofloxacin  400 mg Intravenous Q12H    enoxparin  40 mg Subcutaneous Q24H    famotidine  20 mg Per G Tube BID    midodrine  10 mg Per G Tube TID    mupirocin   Nasal BID    oxybutynin  5 mg Oral BID       Medications (infusions):      sodium chloride 0.9% Stopped (08/03/22 1305)    NORepinephrine bitartrate-D5W Stopped (08/01/22 1140)       Medications (prn):     sodium chloride, acetaminophen, carboxymethylcellulose sodium, dextrose 10%, dextrose 10%, magnesium oxide, magnesium oxide, potassium bicarbonate, potassium bicarbonate, potassium bicarbonate, potassium, sodium phosphates, potassium, sodium phosphates, potassium, sodium phosphates    Family History:   Family History   Problem Relation Age of Onset    No Known Problems Mother     No Known Problems Father        Social History: Tobacco:   Social History     Tobacco Use   Smoking Status Never Smoker   Smokeless Tobacco Never Used                                EtOH:   Social History     Substance and Sexual Activity   Alcohol Use Not Currently                                Drugs:   Social History     Substance and Sexual Activity   Drug Use Not Currently         Physical Exam    Vital signs:  Temp:  [93.5 °F (34.2 °C)-98.1 °F (36.7 °C)]   Pulse:  [58-97]   Resp:  [16-24]   BP: ()/(52-68)   SpO2:  [95 %-100 %]     Intake/Output:     Intake/Output Summary (Last 24 hours) at 8/3/2022 1311  Last data filed at 8/3/2022 1213  Gross per 24 hour   Intake 1911.67 ml   Output 1750 ml   Net 161.67 ml        BMI: Estimated body mass index is 32.98 kg/m² as calculated from the following:     Height as of this encounter: 5' (1.524 m).    Weight as of this encounter: 76.6 kg (168 lb 14 oz).    Physical Exam  Vitals and nursing note reviewed.   Constitutional:       General: She is not in acute distress.     Appearance: She is ill-appearing (chronically). She is not toxic-appearing or diaphoretic.      Comments: Eyes open, no response and does not fix on objects   HENT:      Head: Normocephalic and atraumatic.      Right Ear: External ear normal.      Left Ear: External ear normal.      Nose: Nose normal. No congestion or rhinorrhea.      Mouth/Throat:      Mouth: Mucous membranes are moist.      Pharynx: Oropharynx is clear. No oropharyngeal exudate or posterior oropharyngeal erythema.   Eyes:      General: No scleral icterus.        Right eye: No discharge.         Left eye: No discharge.      Extraocular Movements: Extraocular movements intact.      Conjunctiva/sclera: Conjunctivae normal.      Pupils: Pupils are equal, round, and reactive to light.      Comments: scleritis   Neck:      Vascular: No carotid bruit.      Comments: Trach   Cardiovascular:      Rate and Rhythm: Normal rate and regular rhythm.      Pulses: Normal pulses.      Heart sounds: No murmur heard.    No friction rub. No gallop.   Pulmonary:      Effort: Pulmonary effort is normal. No respiratory distress.      Breath sounds: No stridor. Rhonchi present. No wheezing or rales.      Comments: Ventilated   Chest:      Chest wall: No tenderness.   Abdominal:      General: There is no distension.      Tenderness: There is no abdominal tenderness. There is no guarding.      Comments: PEG  Biliary drain in place  Hypoactive BS   Genitourinary:     Comments: carroll  Musculoskeletal:         General: No swelling. Normal range of motion.      Cervical back: Normal range of motion and neck supple. No rigidity or tenderness.      Right lower leg: No edema.      Left lower leg: No edema.      Comments: + contractures   Lymphadenopathy:       Cervical: No cervical adenopathy.   Skin:     General: Skin is warm and dry.      Coloration: Skin is not jaundiced.      Findings: No bruising.   Neurological:      Comments: Unresponsive  Chronic changes  No acute changes reported   Psychiatric:      Comments: Calm          Laboratory    Recent Labs   Lab 08/03/22 0337   WBC 3.74*   RBC 3.19*   HGB 8.6*   HCT 27.5*      MCV 86   MCH 27.0   MCHC 31.3*       Recent Labs   Lab 08/03/22 0337   CALCIUM 8.5*   PROT 7.4      K 3.8   CO2 27      BUN 13   CREATININE 0.3*   ALKPHOS 177*   ALT 63*   AST 46*   BILITOT 0.7       No results for input(s): PT, INR, APTT in the last 24 hours.    No results for input(s): CPK, CPKMB, TROPONINI, MB in the last 24 hours.    Additional labs: reviewed    Microbiology:       Microbiology Results (last 7 days)     Procedure Component Value Units Date/Time    Blood culture #2 **CANNOT BE ORDERED STAT** [[id number]] Collected: 07/29/22 0345    Order Status: Completed Specimen: Blood from Peripheral, Forearm, Right Updated: 08/03/22 0432     Blood Culture, Routine No growth after 5 days.    Blood culture #1 **CANNOT BE ORDERED STAT** [[id number]] Collected: 07/29/22 0320    Order Status: Completed Specimen: Blood from Peripheral, Hand, Right Updated: 08/03/22 0432     Blood Culture, Routine No growth after 5 days.    Culture, Respiratory with Gram Stain [[id number]]  (Abnormal)  (Susceptibility) Collected: 07/29/22 0306    Order Status: Completed Specimen: Respiratory from Tracheal Aspirate Updated: 08/01/22 1228     Respiratory Culture PSEUDOMONAS AERUGINOSA  Moderate        PROTEUS MIRABILIS ESBL  Moderate  Results called to and read back by RN M3 ANKIT HOLDSWORTH  07/31/2022    09:37 JT       Comment: Previous value was PPR verified by MERRYT at 08:37 on 07/30/2022  Previous comment was modified by JFERNANDOT at 06:29 on 07/31/2022   Identification and susceptibility pending          Gram Stain (Respiratory) Many WBC's     Gram  Stain (Respiratory) <10 epithelial cells per low power field.     Gram Stain (Respiratory) Few Gram negative rods    Urine culture [[id number]] Collected: 07/29/22 0545    Order Status: Completed Specimen: Urine Updated: 07/30/22 0751     Urine Culture, Routine Multiple organisms isolated. None in predominance.  Repeat if      clinically necessary.    Narrative:      Specimen Source->Urine    Urine Culture High Risk [[id number]]     Order Status: Completed Specimen: Urine, Catheterized     Urine Culture High Risk [[id number]]     Order Status: Canceled Specimen: Urine, Catheterized           Radiology    X-Ray Chest 1 View  Portable chest x-ray 4:52 AM is compared to prior study dated 7/30/2022    Clinical history is pneumonia    The tracheostomy tube and right subclavian vein catheter are in stable position.  The cardiomediastinal silhouette is normal in size.    There is improvement of the interstitial opacities suggestive of resolving edema or pneumonia. There are no new infiltrates or pleural effusions.    IMPRESSION: Improvement of the interstitial opacities suggestive of resolving edema or pneumonia    Electronically signed by:  Jazmin Patton MD  8/2/2022 6:56 AM CDT Workstation: [id number]        Additional Studies    reviewed    Ventilator Information    Vent Mode: A/C  Oxygen Concentration (%):  [25] 25  Resp Rate Total:  [16 br/min-19 br/min] 16 br/min  Vt Set:  [450 mL] 450 mL  PEEP/CPAP:  [5 cmH20] 5 cmH20  Mean Airway Pressure:  [10 cmH20-15 cmH20] 12 cmH20         No results for input(s): PH, PCO2, PO2, HCO3, POCSATURATED, BE in the last 72 hours.      Impression    Active Hospital Problems    Diagnosis  POA    *Aspiration pneumonia [J69.0]  Yes    Ventilator dependence [Z99.11]  Not Applicable    Cirrhosis of liver [K74.60]  Yes    UTI (urinary tract infection) [N39.0]  Yes    Chronic respiratory failure with hypoxia [J96.11]  Yes    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]  Not Applicable    PEG  (percutaneous endoscopic gastrostomy) status [Z93.1]  Not Applicable    Anemia of chronic disease [D63.8]  Yes    Persistent vegetative state [R40.3]  Yes    Pressure injury of left buttock, 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  Added automatically from request for surgery [id number]        Resolved Hospital Problems    Diagnosis Date Resolved POA    Shock, unspecified [R57.9] 08/03/2022 Yes       Plan    · Continue vent support - vent dependent  · IVF, pressors as needed  · Broad spectrum antibiotics  - ID following  · Contact isolation  · Nebs and mucomyst to help with secretions  · Check cortisol due to hypothermia - pending  · ? Start to consider transfer back to [place] to complete antibiotics    Thank you for this consult.  I will follow with you while the patient is hospitalized.          Reji Andrews MD  [facility] Pulmonary/Critical Care

## 2022-08-03 NOTE — CARE UPDATE
08/03/22 0900   Patient Assessment/Suction   Level of Consciousness (AVPU) responds to pain   Respiratory Effort Unlabored   Expansion/Accessory Muscles/Retractions expansion symmetric   All Lung Fields Breath Sounds coarse   Rhythm/Pattern, Respiratory pattern regular;unlabored   Cough Frequency with stimulation   Cough Type assisted   Suction Method tracheal   Skin Integrity   $ Wound Care Tech Time 15 min   Area Observed Neck under tracheostomy   Skin Appearance without discoloration   Barrier used? Foam Ag   PRE-TX-O2   O2 Device (Oxygen Therapy) ventilator   $ Is the patient on Low Flow Oxygen? Yes   Oxygen Concentration (%) 25   SpO2 99 %   Pulse Oximetry Type Continuous   Pulse 97   Resp (!) 24   BP (!) 97/57        Surgical Airway Portex Cuffed   No placement date or time found.   Present Prior to Hospital Arrival?: Yes  Type: Tracheostomy  Brand: Portex  Airway Device Size: 8.0  Style: Cuffed   Cuff Pressure   (mlt)   Cuff Inflation? Deflated   Speaking Valve Usage Not wearing   Site Assessment Clean;Dry;No bleeding   Vent Select   Conventional Vent Y   Charged w/in last 24h YES   Preset Conventional Ventilator Settings   Vent ID [id number]   Vent Type    Ventilation Type VC   Vent Mode A/C   Humidity HME   Set Rate 16 BPM   Vt Set 450 mL   PEEP/CPAP 5 cmH20   Peak Flow 60 L/min   Peak End Inspiratory Pressure 28 cmH20   I-Trigger Type  V-TRIG   Trigger Sensitivity Flow/I-Trigger 2 L/min   Patient Ventilator Parameters   Resp Rate Total 19 br/min   Peak Airway Pressure 31 cmH2O   Mean Airway Pressure 12 cmH20   Plateau Pressure 30 cmH20   Exhaled Vt 519 mL   Total Ve 8.94 mL   I:E Ratio Measured 1:3.60   Auto PEEP 0 cmH20   Conventional Ventilator Alarms   Resp Rate High Alarm 40 br/min   Press High Alarm 60 cmH2O   Apnea Rate 16   Apnea Volume (mL) 0 mL   Apnea Oxygen Concentration  100   Apnea Flow Rate (L/min) 60   T Apnea 20 sec(s)   IHI Ventilator Associated Pneumonia Bundle (Required)   Daily  Awakening Trials Performed Not applicable   Daily Assessment of Readiness to Extubate No (Comment)   Head of Bed Elevated  HOB 30   Ready to Wean/Extubation Screen   FIO2<=50 (chart decimal) 0.25   MV<16L (chart vol.) 8.94   PEEP <=8 (chart #) 5   Ready to Wean Parameters   F/VT Ratio<105 (RSBI) (!) 46.24   Vital Capacity (mL) 0   Education   $ Education Ventilator Oxygen;15 min

## 2022-08-03 NOTE — PLAN OF CARE
Pt tolerating ventilator. Temperature maintaining WNL.   Problem: Infection  Goal: Absence of Infection Signs and Symptoms  Outcome: Ongoing, Progressing     Problem: Adult Inpatient Plan of Care  Goal: Plan of Care Review  Outcome: Ongoing, Progressing  Goal: Patient-Specific Goal (Individualized)  Outcome: Ongoing, Progressing  Goal: Absence of Hospital-Acquired Illness or Injury  Outcome: Ongoing, Progressing  Goal: Optimal Comfort and Wellbeing  Outcome: Ongoing, Progressing  Goal: Readiness for Transition of Care  Outcome: Ongoing, Progressing     Problem: Impaired Wound Healing  Goal: Optimal Wound Healing  Outcome: Ongoing, Progressing     Problem: Communication Impairment (Mechanical Ventilation, Invasive)  Goal: Effective Communication  Outcome: Ongoing, Progressing     Problem: Device-Related Complication Risk (Mechanical Ventilation, Invasive)  Goal: Optimal Device Function  Outcome: Ongoing, Progressing     Problem: Inability to Wean (Mechanical Ventilation, Invasive)  Goal: Mechanical Ventilation Liberation  Outcome: Ongoing, Progressing     Problem: Nutrition Impairment (Mechanical Ventilation, Invasive)  Goal: Optimal Nutrition Delivery  Outcome: Ongoing, Progressing     Problem: Skin and Tissue Injury (Mechanical Ventilation, Invasive)  Goal: Absence of Device-Related Skin and Tissue Injury  Outcome: Ongoing, Progressing     Problem: Ventilator-Induced Lung Injury (Mechanical Ventilation, Invasive)  Goal: Absence of Ventilator-Induced Lung Injury  Outcome: Ongoing, Progressing     Problem: Communication Impairment (Artificial Airway)  Goal: Effective Communication  Outcome: Ongoing, Progressing     Problem: Device-Related Complication Risk (Artificial Airway)  Goal: Optimal Device Function  Outcome: Ongoing, Progressing     Problem: Skin and Tissue Injury (Artificial Airway)  Goal: Absence of Device-Related Skin or Tissue Injury  Outcome: Ongoing, Progressing     Problem: Noninvasive Ventilation  Acute  Goal: Effective Unassisted Ventilation and Oxygenation  Outcome: Ongoing, Progressing     Problem: Skin Injury Risk Increased  Goal: Skin Health and Integrity  Outcome: Ongoing, Progressing     Problem: Aspiration (Enteral Nutrition)  Goal: Absence of Aspiration Signs and Symptoms  Outcome: Ongoing, Progressing     Problem: Device-Related Complication Risk (Enteral Nutrition)  Goal: Safe, Effective Therapy Delivery  Outcome: Ongoing, Progressing     Problem: Feeding Intolerance (Enteral Nutrition)  Goal: Feeding Tolerance  Outcome: Ongoing, Progressing     Problem: Fall Injury Risk  Goal: Absence of Fall and Fall-Related Injury  Outcome: Ongoing, Progressing

## 2022-08-03 NOTE — CARE UPDATE
08/03/22 0716   Patient Assessment/Suction   Level of Consciousness (AVPU) responds to pain   Respiratory Effort Unlabored   Expansion/Accessory Muscles/Retractions expansion symmetric   All Lung Fields Breath Sounds Anterior:;coarse   Rhythm/Pattern, Respiratory unlabored;depth regular;assisted mechanically   Cough Frequency with stimulation   Cough Type assisted   Suction Method tracheal   $ Suction Charges Inline Suction Procedure Stat Charge   Secretions Amount small   Secretions Color white;clear   Secretions Characteristics thin   PRE-TX-O2   O2 Device (Oxygen Therapy) ventilator   Oxygen Concentration (%) 25   SpO2 100 %   Pulse Oximetry Type Continuous   $ Pulse Oximetry - Single Charge Pulse Oximetry - Single   Pulse 69   Resp 16   Aerosol Therapy   $ Aerosol Therapy Charges Aerosol Treatment   Daily Review of Necessity (SVN) completed   Respiratory Treatment Status (SVN) given   Treatment Route (SVN) in-line;ventilator   Patient Position (SVN) HOB elevated   Post Treatment Assessment (SVN) breath sounds unchanged   Signs of Intolerance (SVN) none        Surgical Airway Portex Cuffed   No placement date or time found.   Present Prior to Hospital Arrival?: Yes  Type: Tracheostomy  Brand: Portex  Airway Device Size: 8.0  Style: Cuffed   Cuff Pressure   (MLT)   Cuff Inflation? Inflated   Speaking Valve Usage Not wearing   Site Assessment Clean;Dry;No bleeding;No drainage   Site Care Dried   Inner Cannula Care Cleansed/dried   Ties Assessment Clean;Dry   Vent Select   Conventional Vent Y   Charged w/in last 24h YES   Preset Conventional Ventilator Settings   Vent ID [id number]   Vent Type    Ventilation Type VC   Vent Mode A/C   Set Rate 16 BPM   Vt Set 450 mL   PEEP/CPAP 5 cmH20   Peak Flow 60 L/min   Peak End Inspiratory Pressure 37 cmH20   I-Trigger Type  V-TRIG   Trigger Sensitivity Flow/I-Trigger 2 L/min   Patient Ventilator Parameters   Resp Rate Total 18 br/min   Peak Airway Pressure 38 cmH2O   Mean  Airway Pressure 12 cmH20   Exhaled Vt 493 mL   Total Ve 7.87 mL   I:E Ratio Measured 1:3.60   Auto PEEP 0 cmH20   Conventional Ventilator Alarms   Alarms On Y   Resp Rate High Alarm 40 br/min   Press High Alarm 60 cmH2O   Apnea Rate 16   Apnea Volume (mL) 0 mL   Apnea Oxygen Concentration  100   Apnea Flow Rate (L/min) 60   T Apnea 20 sec(s)   IHI Ventilator Associated Pneumonia Bundle (Required)   Daily Awakening Trials Performed Not applicable   Daily Assessment of Readiness to Extubate No (Comment)   Head of Bed Elevated  HOB 30   Oral Care Mouth swabbed;Mouth moisturizer   Vent Circut Breaks Minimized Yes   Ready to Wean/Extubation Screen   FIO2<=50 (chart decimal) 0.25   MV<16L (chart vol.) 7.87   PEEP <=8 (chart #) 5   Ready to Wean Parameters   F/VT Ratio<105 (RSBI) (!) 32.45   Education   $ Education Ventilator Oxygen;Trach Care;Suction;30 min   Respiratory Evaluation   $ Care Plan Tech Time 30 min   $ Eval/Re-eval Charges Re-evaluation   Evaluation For   (care plan)

## 2022-08-03 NOTE — PROGRESS NOTES
[facility] Medicine  Progress Note    Patient Name: Genna Felix  MRN: [id number]  Patient Class: IP- Inpatient   Admission Date: 7/29/2022  Length of Stay: 5 days  Attending Physician: Tee Finn MD  Primary Care Provider: Primary Doctor No        Subjective:     Principal Problem:Aspiration pneumonia        HPI:  Pt got admitted from NH with aspiration pneumonia and Shock  Pt was Hypothermic in ER which resolved soon  Pt has PEG tube/Trach/Tube from gall bladder area/Urine Catheter  Family not unavailable in person or via Phone  CXR showed aspiration pneumonia and pt got admitted in ICU       Overview/Hospital Course:  07/30  Afebrile  Still on pressors  Condition better compared to yesterday    07/31  Started on Zerbaxa and Cipro  On pressors  Overall poor condition persists  Still unable to get in touch with family     08/01  Slight improvement overall  Pressors stopped around noon  BP levels stable    8/2:  No acute events overnight.   VSS.   Afebrile.     8/3:  VSS.  Afebrile.   No significant changes since yesterday. Slightly improved overall.     Vitals:    08/03/22 0504 08/03/22 0701 08/03/22 0716 08/03/22 0900   BP: 101/60 (!) 102/56  (!) 97/57   BP Location:  Right leg     Patient Position:       Pulse: 70 71 69 97   Resp: 16 16 16 (!) 24   Temp:  97.8 °F (36.6 °C)     TempSrc:  Axillary     SpO2: 100% 100% 100% 99%   Weight:       Height:           Vitals and nursing note reviewed.   Constitutional:       Appearance: She is ill-appearing.   HENT:      Head: Atraumatic.      Right Ear: External ear normal.      Left Ear: External ear normal.      Nose: Nose normal.      Mouth/Throat:      Mouth: Mucous membranes are moist.   Neck:      Comments: Trach  Cardiovascular:      Rate and Rhythm: Normal rate.   Pulmonary:      Effort: Pulmonary effort is normal.   Abdominal:      Comments: RUQ area drain  PEG tube   Musculoskeletal:         General: No deformity.   Skin:      General: Skin is warm.   Neurological:      Mental Status: She is alert. Mental status is at baseline.      Comments: Trach and vent dependent          Assessment/Plan:      * Aspiration pneumonia  Continue present iv abx to cover aspiration and hospital acquired pneumonia(Pt lives in NH and Trach/Vent dependent  On  Zerbaxa and Cipro iv for ESBL proteus and Resistant ?Pseudomonas  Both organisms sensitive to zosyn  Defer abx regimen to ID       Shock, unspecified  Now off pressors       Ventilator dependence  Stable  Chronic issue       UTI (urinary tract infection)  Catheter associated  Vs Contaminant  Iv abx       Cirrhosis of liver  Aware       Pressure injury of left buttock, stage 4  Aware       Chronic respiratory failure with hypoxia  Trach/Vent dependent      PEG (percutaneous endoscopic gastrostomy) status  Stable  Chronic issue       Tracheostomy dependence  Stable  Chronic issue       Persistent vegetative state  Stable  Chronic issue       Anemia of chronic disease  Stable  Chronic issue       VTE Risk Mitigation (From admission, onward)         Ordered     enoxaparin injection 40 mg  Every 24 hours (non-standard times)         07/29/22 0806     IP VTE HIGH RISK PATIENT  Once         07/29/22 0805     Place sequential compression device  Until discontinued         07/29/22 0805                Discharge Planning   [place]: 8/3/2022     Code Status: Full Code   Is the patient medically ready for discharge?:     Reason for patient still in hospital (select all that apply): Treatment  Discharge Plan A: Return to nursing home                  Tee Finn MD  Department of Hospital Medicine   [facility]

## 2022-08-03 NOTE — NURSING
Pt lying in bed in no acute distress. Repositioned pt. See ongoing assessment and charted vital signs in epic. See telemetry strip in chart. Verified ventilator settings against md order. IV drips infusing per md order. Beth hugger in place. See charted temperature in epic. Respiratory present in room performing trach care. 6 inch Shiley present at bedside. Will continue to monitor.

## 2022-08-03 NOTE — NURSING
61 yr old female  Trach and vented  Contracted   Nursing consult for blisters to the right hand   Hand cleaned with chlorhexidine/ns  And betadine    Roof replaced     Larger tight clear blister released               medihoney applied and covered with adaptic 4x4s and kerlex   Recommendation:   rright hand  Skin tear protocol: Clean area with chlorhexidine/ns. Pat dry. Replace skin flap with sterile Q-tip. Apply medihoney, adaptic, 4x4 and kerlex wrap. Change every 72 hours or if drainage strikes through.

## 2022-08-03 NOTE — NURSING
Pt lying in bed in no acute distress. Dr. Iniguez present assessing pt. See new orders in epic for discharge back to [place]. Will continue to monitor.

## 2022-08-03 NOTE — PLAN OF CARE
08/02/22 1911   Patient Assessment/Suction   Level of Consciousness (AVPU) responds to voice   Respiratory Effort Unlabored   Expansion/Accessory Muscles/Retractions expansion symmetric   All Lung Fields Breath Sounds coarse   Rhythm/Pattern, Respiratory assisted mechanically   Cough Frequency with stimulation   Cough Type assisted   Suction Method tracheal   $ Suction Charges Inline Suction Procedure Stat Charge   Secretions Amount moderate   Secretions Color yellow   Secretions Characteristics thick   PRE-TX-O2   O2 Device (Oxygen Therapy) ventilator   $ Is the patient on Low Flow Oxygen? Yes   Oxygen Concentration (%) 25   SpO2 100 %   Pulse Oximetry Type Continuous   $ Pulse Oximetry - Multiple Charge Pulse Oximetry - Multiple   Pulse 61   Resp 16   Aerosol Therapy   $ Aerosol Therapy Charges Aerosol Treatment   Daily Review of Necessity (SVN) completed   Respiratory Treatment Status (SVN) given   Treatment Route (SVN) in-line   Patient Position (SVN) semi-Le's   Post Treatment Assessment (SVN) breath sounds unchanged   Signs of Intolerance (SVN) none   Breath Sounds Post-Respiratory Treatment   Throughout All Fields Post-Treatment All Fields   Throughout All Fields Post-Treatment no change   Post-treatment Heart Rate (beats/min) 56   Post-treatment Resp Rate (breaths/min) 16        Surgical Airway Portex Cuffed   No placement date or time found.   Present Prior to Hospital Arrival?: Yes  Type: Tracheostomy  Brand: Portex  Airway Device Size: 8.0  Style: Cuffed   Cuff Pressure   (mlt  isolation)   Status Secured   Site Assessment Clean;Dry;No bleeding   Ties Assessment Clean;Dry;Intact;Secure   Vent Select   Charged w/in last 24h YES   Preset Conventional Ventilator Settings   Vent ID [id number]   Vent Type    Ventilation Type VC   Vent Mode A/C   Humidity HME   Set Rate 16 BPM   Vt Set 450 mL   PEEP/CPAP 5 cmH20   Peak Flow 60 L/min   Peak End Inspiratory Pressure 28 cmH20   I-Trigger Type  V-TRIG   Trigger  Sensitivity Flow/I-Trigger 2 L/min   Patient Ventilator Parameters   Resp Rate Total 16 br/min   Peak Airway Pressure 33 cmH2O   Mean Airway Pressure 11 cmH20   Plateau Pressure 0 cmH20   Exhaled Vt 469 mL   Total Ve 7.44 mL   I:E Ratio Measured 1:3.60   Auto PEEP 0 cmH20   Conventional Ventilator Alarms   Resp Rate High Alarm 40 br/min   Press High Alarm 60 cmH2O   Apnea Rate 16   Apnea Volume (mL) 0 mL   Apnea Oxygen Concentration  100   Apnea Flow Rate (L/min) 60   T Apnea 20 sec(s)   Ready to Wean/Extubation Screen   FIO2<=50 (chart decimal) 0.25   MV<16L (chart vol.) 7.44   PEEP <=8 (chart #) 5   Ready to Wean Parameters   F/VT Ratio<105 (RSBI) (!) 34.12   Vital Capacity (mL) 0   Education   $ Education Ventilator Oxygen;15 min

## 2022-08-04 ENCOUNTER — TELEPHONE (OUTPATIENT)
Dept: SURGERY | Facility: CLINIC | Age: 62
End: 2022-08-04
Payer: MEDICARE

## 2022-08-04 LAB
ALBUMIN SERPL BCP-MCNC: 2.7 G/DL (ref 3.5–5.2)
ALP SERPL-CCNC: 189 U/L (ref 55–135)
ALT SERPL W/O P-5'-P-CCNC: 58 U/L (ref 10–44)
ANION GAP SERPL CALC-SCNC: 7 MMOL/L (ref 8–16)
AST SERPL-CCNC: 35 U/L (ref 10–40)
BILIRUB SERPL-MCNC: 0.6 MG/DL (ref 0.1–1)
BUN SERPL-MCNC: 12 MG/DL (ref 6–20)
CALCIUM SERPL-MCNC: 8.6 MG/DL (ref 8.7–10.5)
CHLORIDE SERPL-SCNC: 105 MMOL/L (ref 95–110)
CO2 SERPL-SCNC: 27 MMOL/L (ref 23–29)
CORTIS SERPL-MCNC: 12.3 UG/DL (ref 4.3–22.4)
CREAT SERPL-MCNC: <0.3 MG/DL (ref 0.5–1.4)
ERYTHROCYTE [DISTWIDTH] IN BLOOD BY AUTOMATED COUNT: 17.9 % (ref 11.5–14.5)
EST. GFR  (NO RACE VARIABLE): >60 ML/MIN/1.73 M^2
GLUCOSE SERPL-MCNC: 115 MG/DL (ref 70–110)
HCT VFR BLD AUTO: 28.2 % (ref 37–48.5)
HGB BLD-MCNC: 8.8 G/DL (ref 12–16)
MAGNESIUM SERPL-MCNC: 1.7 MG/DL (ref 1.6–2.6)
MCH RBC QN AUTO: 26.8 PG (ref 27–31)
MCHC RBC AUTO-ENTMCNC: 31.2 G/DL (ref 32–36)
MCV RBC AUTO: 86 FL (ref 82–98)
PLATELET # BLD AUTO: 222 K/UL (ref 150–450)
PMV BLD AUTO: 10.4 FL (ref 9.2–12.9)
POTASSIUM SERPL-SCNC: 3.7 MMOL/L (ref 3.5–5.1)
PROT SERPL-MCNC: 7.7 G/DL (ref 6–8.4)
RBC # BLD AUTO: 3.28 M/UL (ref 4–5.4)
SODIUM SERPL-SCNC: 139 MMOL/L (ref 136–145)
WBC # BLD AUTO: 5.54 K/UL (ref 3.9–12.7)

## 2022-08-04 PROCEDURE — 25000003 PHARM REV CODE 250: Performed by: INTERNAL MEDICINE

## 2022-08-04 PROCEDURE — 94799 UNLISTED PULMONARY SVC/PX: CPT

## 2022-08-04 PROCEDURE — 94760 N-INVAS EAR/PLS OXIMETRY 1: CPT

## 2022-08-04 PROCEDURE — 63600175 PHARM REV CODE 636 W HCPCS: Performed by: STUDENT IN AN ORGANIZED HEALTH CARE EDUCATION/TRAINING PROGRAM

## 2022-08-04 PROCEDURE — 20000000 HC ICU ROOM

## 2022-08-04 PROCEDURE — 99900031 HC PATIENT EDUCATION (STAT)

## 2022-08-04 PROCEDURE — 25000003 PHARM REV CODE 250

## 2022-08-04 PROCEDURE — 94640 AIRWAY INHALATION TREATMENT: CPT

## 2022-08-04 PROCEDURE — 99233 PR SUBSEQUENT HOSPITAL CARE,LEVL III: ICD-10-PCS | Mod: ,,, | Performed by: INTERNAL MEDICINE

## 2022-08-04 PROCEDURE — 83735 ASSAY OF MAGNESIUM: CPT | Performed by: INTERNAL MEDICINE

## 2022-08-04 PROCEDURE — 80053 COMPREHEN METABOLIC PANEL: CPT | Performed by: INTERNAL MEDICINE

## 2022-08-04 PROCEDURE — 25000242 PHARM REV CODE 250 ALT 637 W/ HCPCS: Performed by: INTERNAL MEDICINE

## 2022-08-04 PROCEDURE — 63600175 PHARM REV CODE 636 W HCPCS: Performed by: INTERNAL MEDICINE

## 2022-08-04 PROCEDURE — 94761 N-INVAS EAR/PLS OXIMETRY MLT: CPT

## 2022-08-04 PROCEDURE — 94003 VENT MGMT INPAT SUBQ DAY: CPT

## 2022-08-04 PROCEDURE — 25000003 PHARM REV CODE 250: Performed by: STUDENT IN AN ORGANIZED HEALTH CARE EDUCATION/TRAINING PROGRAM

## 2022-08-04 PROCEDURE — 99233 SBSQ HOSP IP/OBS HIGH 50: CPT | Mod: ,,, | Performed by: INTERNAL MEDICINE

## 2022-08-04 PROCEDURE — 27000221 HC OXYGEN, UP TO 24 HOURS

## 2022-08-04 PROCEDURE — 85027 COMPLETE CBC AUTOMATED: CPT | Performed by: INTERNAL MEDICINE

## 2022-08-04 PROCEDURE — 99900035 HC TECH TIME PER 15 MIN (STAT)

## 2022-08-04 PROCEDURE — 99900026 HC AIRWAY MAINTENANCE (STAT)

## 2022-08-04 PROCEDURE — 82533 TOTAL CORTISOL: CPT | Performed by: INTERNAL MEDICINE

## 2022-08-04 RX ORDER — COSYNTROPIN 0.25 MG/ML
0.25 INJECTION, POWDER, FOR SOLUTION INTRAMUSCULAR; INTRAVENOUS ONCE
Status: COMPLETED | OUTPATIENT
Start: 2022-08-05 | End: 2022-08-05

## 2022-08-04 RX ADMIN — CEFTOLOZANE AND TAZOBACTAM 1500 MG: 1; .5 INJECTION, POWDER, LYOPHILIZED, FOR SOLUTION INTRAVENOUS at 11:08

## 2022-08-04 RX ADMIN — MIDODRINE HYDROCHLORIDE 10 MG: 2.5 TABLET ORAL at 03:08

## 2022-08-04 RX ADMIN — CHLORHEXIDINE GLUCONATE 15 ML: 1.2 RINSE ORAL at 10:08

## 2022-08-04 RX ADMIN — MIDODRINE HYDROCHLORIDE 10 MG: 2.5 TABLET ORAL at 10:08

## 2022-08-04 RX ADMIN — IPRATROPIUM BROMIDE AND ALBUTEROL SULFATE 3 ML: .5; 3 SOLUTION RESPIRATORY (INHALATION) at 01:08

## 2022-08-04 RX ADMIN — IPRATROPIUM BROMIDE AND ALBUTEROL SULFATE 3 ML: .5; 3 SOLUTION RESPIRATORY (INHALATION) at 07:08

## 2022-08-04 RX ADMIN — MUPIROCIN 1 G: 20 OINTMENT TOPICAL at 10:08

## 2022-08-04 RX ADMIN — OXYBUTYNIN CHLORIDE 5 MG: 5 TABLET ORAL at 10:08

## 2022-08-04 RX ADMIN — CARBOXYMETHYLCELLULOSE SODIUM 1 DROP: 0.5 SOLUTION, GEL FORMING / DROPS OPHTHALMIC at 04:08

## 2022-08-04 RX ADMIN — CEFTOLOZANE AND TAZOBACTAM 1500 MG: 1; .5 INJECTION, POWDER, LYOPHILIZED, FOR SOLUTION INTRAVENOUS at 03:08

## 2022-08-04 RX ADMIN — CEFTOLOZANE AND TAZOBACTAM 1500 MG: 1; .5 INJECTION, POWDER, LYOPHILIZED, FOR SOLUTION INTRAVENOUS at 05:08

## 2022-08-04 RX ADMIN — POTASSIUM BICARBONATE 50 MEQ: 977.5 TABLET, EFFERVESCENT ORAL at 04:08

## 2022-08-04 RX ADMIN — ACETYLCYSTEINE 4 ML: 200 INHALANT RESPIRATORY (INHALATION) at 01:08

## 2022-08-04 RX ADMIN — ACETYLCYSTEINE 4 ML: 200 INHALANT RESPIRATORY (INHALATION) at 07:08

## 2022-08-04 RX ADMIN — CARBOXYMETHYLCELLULOSE SODIUM 1 DROP: 0.5 SOLUTION, GEL FORMING / DROPS OPHTHALMIC at 11:08

## 2022-08-04 RX ADMIN — CIPROFLOXACIN 400 MG: 2 INJECTION, SOLUTION INTRAVENOUS at 03:08

## 2022-08-04 RX ADMIN — ACETAMINOPHEN 650 MG: 325 TABLET ORAL at 11:08

## 2022-08-04 RX ADMIN — ENOXAPARIN SODIUM 40 MG: 40 INJECTION SUBCUTANEOUS at 10:08

## 2022-08-04 RX ADMIN — CARBOXYMETHYLCELLULOSE SODIUM 1 DROP: 0.5 SOLUTION, GEL FORMING / DROPS OPHTHALMIC at 12:08

## 2022-08-04 RX ADMIN — CARBOXYMETHYLCELLULOSE SODIUM 1 DROP: 0.5 SOLUTION, GEL FORMING / DROPS OPHTHALMIC at 10:08

## 2022-08-04 RX ADMIN — FAMOTIDINE 20 MG: 20 TABLET ORAL at 10:08

## 2022-08-04 RX ADMIN — CIPROFLOXACIN 400 MG: 2 INJECTION, SOLUTION INTRAVENOUS at 02:08

## 2022-08-04 RX ADMIN — Medication 800 MG: at 04:08

## 2022-08-04 NOTE — PLAN OF CARE
Problem: Infection  Goal: Absence of Infection Signs and Symptoms  Outcome: Ongoing, Progressing     Problem: Adult Inpatient Plan of Care  Goal: Plan of Care Review  Outcome: Ongoing, Progressing  Goal: Patient-Specific Goal (Individualized)  Outcome: Ongoing, Progressing  Goal: Absence of Hospital-Acquired Illness or Injury  Outcome: Ongoing, Progressing  Goal: Optimal Comfort and Wellbeing  Outcome: Ongoing, Progressing  Goal: Readiness for Transition of Care  Outcome: Ongoing, Progressing     Problem: Impaired Wound Healing  Goal: Optimal Wound Healing  Outcome: Ongoing, Progressing     Problem: Communication Impairment (Mechanical Ventilation, Invasive)  Goal: Effective Communication  Outcome: Ongoing, Progressing     Problem: Device-Related Complication Risk (Mechanical Ventilation, Invasive)  Goal: Optimal Device Function  Outcome: Ongoing, Progressing     Problem: Inability to Wean (Mechanical Ventilation, Invasive)  Goal: Mechanical Ventilation Liberation  Outcome: Ongoing, Progressing     Problem: Nutrition Impairment (Mechanical Ventilation, Invasive)  Goal: Optimal Nutrition Delivery  Outcome: Ongoing, Progressing     Problem: Skin and Tissue Injury (Mechanical Ventilation, Invasive)  Goal: Absence of Device-Related Skin and Tissue Injury  Outcome: Ongoing, Progressing     Problem: Ventilator-Induced Lung Injury (Mechanical Ventilation, Invasive)  Goal: Absence of Ventilator-Induced Lung Injury  Outcome: Ongoing, Progressing     Problem: Communication Impairment (Artificial Airway)  Goal: Effective Communication  Outcome: Ongoing, Progressing     Problem: Device-Related Complication Risk (Artificial Airway)  Goal: Optimal Device Function  Outcome: Ongoing, Progressing     Problem: Skin and Tissue Injury (Artificial Airway)  Goal: Absence of Device-Related Skin or Tissue Injury  Outcome: Ongoing, Progressing     Problem: Noninvasive Ventilation Acute  Goal: Effective Unassisted Ventilation and  Oxygenation  Outcome: Ongoing, Progressing     Problem: Skin Injury Risk Increased  Goal: Skin Health and Integrity  Outcome: Ongoing, Progressing     Problem: Aspiration (Enteral Nutrition)  Goal: Absence of Aspiration Signs and Symptoms  Outcome: Ongoing, Progressing     Problem: Device-Related Complication Risk (Enteral Nutrition)  Goal: Safe, Effective Therapy Delivery  Outcome: Ongoing, Progressing     Problem: Feeding Intolerance (Enteral Nutrition)  Goal: Feeding Tolerance  Outcome: Ongoing, Progressing     Problem: Fall Injury Risk  Goal: Absence of Fall and Fall-Related Injury  Outcome: Ongoing, Progressing

## 2022-08-04 NOTE — PROGRESS NOTES
[facility] Medicine  Progress Note    Patient Name: Genna Felxi  MRN: [id number]  Patient Class: IP- Inpatient   Admission Date: 7/29/2022  Length of Stay: 6 days  Attending Physician: Tee Finn MD  Primary Care Provider: Primary Doctor No        Subjective:     Principal Problem:Aspiration pneumonia        HPI:  Pt got admitted from NH with aspiration pneumonia and Shock  Pt was Hypothermic in ER which resolved soon  Pt has PEG tube/Trach/Tube from gall bladder area/Urine Catheter  Family not unavailable in person or via Phone  CXR showed aspiration pneumonia and pt got admitted in ICU       Overview/Hospital Course:  07/30  Afebrile  Still on pressors  Condition better compared to yesterday    07/31  Started on Zerbaxa and Cipro  On pressors  Overall poor condition persists  Still unable to get in touch with family     08/01  Slight improvement overall  Pressors stopped around noon  BP levels stable    8/2:  No acute events overnight.   VSS.   Afebrile.     8/3:  VSS.  Afebrile.   No significant changes since yesterday. Slightly improved overall.     8/4:  BP lower today. Remains intermittently hypothermic.       Vitals:    08/04/22 1500 08/04/22 1600 08/04/22 1700 08/04/22 1711   BP: (!) 86/50 (!) 90/54 115/64    Pulse: 69 63 68 68   Resp: 16 16 16 16   Temp:   96.7 °F (35.9 °C)    TempSrc:       SpO2: 100% 99% 100% 100%   Weight:       Height:           Vitals and nursing note reviewed.   Constitutional:       Appearance: She is ill-appearing.   HENT:      Head: Atraumatic.      Right Ear: External ear normal.      Left Ear: External ear normal.      Nose: Nose normal.      Mouth/Throat:      Mouth: Mucous membranes are moist.   Neck:      Comments: Trach  Cardiovascular:      Rate and Rhythm: Normal rate.   Pulmonary:      Effort: Pulmonary effort is normal.   Abdominal:      Comments: RUQ area drain  PEG tube   Musculoskeletal:         General: No deformity.   Skin:     General:  Skin is warm.   Neurological:      Mental Status: She is alert. Mental status is at baseline.      Comments: Trach and vent dependent          Assessment/Plan:      * Aspiration pneumonia  Continue present iv abx to cover aspiration and hospital acquired pneumonia(Pt lives in NH and Trach/Vent dependent  On  Zerbaxa and Cipro iv for ESBL proteus and Resistant ?Pseudomonas  Both organisms sensitive to zosyn  Defer abx regimen to ID       Shock, unspecified  Now off pressors       Ventilator dependence  Stable  Chronic issue       UTI (urinary tract infection)  Catheter associated  Vs Contaminant  Iv abx       Cirrhosis of liver  Aware       Pressure injury of left buttock, stage 4  Aware       Chronic respiratory failure with hypoxia  Trach/Vent dependent      PEG (percutaneous endoscopic gastrostomy) status  Stable  Chronic issue       Tracheostomy dependence  Stable  Chronic issue       Persistent vegetative state  Stable  Chronic issue       Anemia of chronic disease  Stable  Chronic issue       VTE Risk Mitigation (From admission, onward)         Ordered     enoxaparin injection 40 mg  Every 24 hours (non-standard times)         07/29/22 0806     IP VTE HIGH RISK PATIENT  Once         07/29/22 0805     Place sequential compression device  Until discontinued         07/29/22 0805                Discharge Planning   [place]: 8/5/2022     Code Status: Full Code   Is the patient medically ready for discharge?:     Reason for patient still in hospital (select all that apply): Treatment  Discharge Plan A: Return to nursing home   Discharge Delays: None known at this time              Tee Finn MD  Department of Hospital Medicine   [facility]

## 2022-08-04 NOTE — PROVIDER TRANSFER
[facility]  Adult Nutrition   Progress Note (Nutrition Support Management)    SUMMARY      Recommendations:   Continue current enteral feeding of Jevity 1.5 at the goal rate of 50 mls/h.    Goals:   Goals: Patient to meet >75% of EEN/EPN via TF    Dietitian Rounds Brief  F/U Nutrition Note: Observed patients TF at the goal rate of 50 mls/h and she is tolerating it well. Her LBM was 8/1/2022. MD states per PN's today:     8/4/2022 - Stable overnight but has remained hypothermic and is on Bairhugger.  She remains unresponsive with eyes open.  No new issues reported by nursing.  Cortisol level OK, stimulation test has been ordered.    Will continue to follow prn.    Diet order: NPO    TF rate/provision: 50/Jevity 1.5    % Intake of Estimated Energy Needs: 75 - 100 %  % Meal Intake: NPO    Estimated/Assessed Needs  Weight Used For Calorie Calculations: 77 kg (169 lb 12.1 oz)     Energy Need Method: [place]  Protein Requirements: 68-91gm (1.5-2gm/kg IBW)  Weight Used For Protein Calculations: 45.5 kg (100 lb 5 oz) (IBW)  Fluid Requirements (mL): 1925 (25ml/kg)             Weight History:  Wt Readings from Last 5 Encounters:   08/02/22 76.6 kg (168 lb 14 oz)   03/25/22 70.9 kg (156 lb 4.9 oz)   01/26/22 69.8 kg (153 lb 14.1 oz)   12/26/21 62.6 kg (138 lb)   10/26/21 62.8 kg (138 lb 7.2 oz)        Reason for Assessment  Reason For Assessment: consult, new tube feeding  Diagnosis:  (Aspiration pneumonia)  Relevant Medical History: trach dependent, PEG, cirrhosis, malnutrition    Medications:Pertinent Medications Reviewed  Scheduled Meds:   acetylcysteine 200 mg/ml (20%)  4 mL Nebulization Q6H WAKE    albuterol-ipratropium  3 mL Nebulization Q6H WAKE    carboxymethylcellulose  1 drop Ophthalmic QID    ceftolozane-tazobactam  1,500 mg Intravenous Q8H    chlorhexidine  15 mL Mouth/Throat BID    ciprofloxacin  400 mg Intravenous Q12H    [START ON 8/5/2022] cosyntropin  0.25 mg Intravenous Once     enoxparin  40 mg Subcutaneous Q24H    famotidine  20 mg Per G Tube BID    midodrine  10 mg Per G Tube TID    mupirocin   Nasal BID    oxybutynin  5 mg Oral BID     Continuous Infusions:  PRN Meds:.sodium chloride, acetaminophen, carboxymethylcellulose sodium, dextrose 10%, dextrose 10%, magnesium oxide, magnesium oxide, potassium bicarbonate, potassium bicarbonate, potassium bicarbonate, potassium, sodium phosphates, potassium, sodium phosphates, potassium, sodium phosphates    Labs: Pertinent Labs Reviewed  Clinical Chemistry:  Recent Labs   Lab 07/29/22 0337 07/30/22  0429 07/31/22 0407 08/01/22  0404 08/04/22  0206   *   < > 139   < > 139   K 5.1   < > 3.7   < > 3.7   CL 96   < > 105   < > 105   CO2 23   < > 26   < > 27   GLU 97   < > 106   < > 115*   BUN 31*   < > 12   < > 12   CREATININE <0.3*   < > <0.3*   < > <0.3*   CALCIUM 8.8   < > 8.6*   < > 8.6*   PROT 9.3*   < > 7.7   < > 7.7   ALBUMIN 3.5   < > 2.8*   < > 2.7*   BILITOT 0.9   < > 0.8   < > 0.6   ALKPHOS 198*   < > 187*   < > 189*   AST 39   < > 31   < > 35   ALT 69*   < > 49*   < > 58*   ANIONGAP 9   < > 8   < > 7*   ESTGFRAFRICA >60.0   < > >60.0  --   --    EGFRNONAA >60.0   < > >60.0  --   --    MG 1.9  --   --    < > 1.7   LIPASE 20  --   --   --   --     < > = values in this interval not displayed.     CBC:   Recent Labs   Lab 08/04/22  0206   WBC 5.54   RBC 3.28*   HGB 8.8*   HCT 28.2*      MCV 86   MCH 26.8*   MCHC 31.2*     Cardiac Profile:  Recent Labs   Lab 07/29/22 0337   BNP 29   TROPONINI <0.030     Monitor and Evaluation  Food and Nutrient Intake: enteral nutrition intake  Food and Nutrient Adminstration: enteral and parenteral nutrition administration  Physical Activity and Function: nutrition-related ADLs and IADLs  Anthropometric Measurements: weight change  Biochemical Data, Medical Tests and Procedures: electrolyte and renal panel, gastrointestinal profile, glucose/endocrine profile  Nutrition-Focused Physical  Findings: overall appearance     Nutrition Risk  Level of Risk/Frequency of Follow-up: high     Nutrition Follow-Up  RD Follow-up?: Yes    Estrella Jo RD 08/04/2022 5:25 PM

## 2022-08-04 NOTE — CARE UPDATE
08/04/22 0734   Patient Assessment/Suction   Level of Consciousness (AVPU) responds to pain   Respiratory Effort Unlabored   Expansion/Accessory Muscles/Retractions no retractions;no use of accessory muscles   All Lung Fields Breath Sounds Anterior:;coarse   Rhythm/Pattern, Respiratory pattern regular;unlabored;assisted mechanically   Cough Frequency with stimulation   Cough Type assisted   Suction Method oral;tracheal   Suction Pressure (mmHg) -120 mmHg   $ Suction Charges Inline Suction Procedure Stat Charge   Secretions Amount small   Secretions Color yellow   Secretions Characteristics thin   PRE-TX-O2   O2 Device (Oxygen Therapy) ventilator   $ Is the patient on Low Flow Oxygen? Yes   Oxygen Concentration (%) 25   SpO2 100 %   Pulse Oximetry Type Continuous   $ Pulse Oximetry - Single Charge Pulse Oximetry - Single   Pulse 66   Resp 16   Vent Select   Conventional Vent Y   Charged w/in last 24h YES   Preset Conventional Ventilator Settings   Vent ID 7   Vent Type    Ventilation Type VC   Vent Mode A/C   Humidity HME   Set Rate 16 BPM   Vt Set 450 mL   PEEP/CPAP 5 cmH20   Waveform RAMP   Ready to Wean/Extubation Screen   FIO2<=50 (chart decimal) 0.25   PEEP <=8 (chart #) 5   Education   $ Education 45 min;Ventilator Oxygen;Suction;Bronchodilator   Respiratory Evaluation   $ Care Plan Tech Time 15 min   $ Eval/Re-eval Charges Evaluation   Evaluation For   (CARE PLAN)

## 2022-08-04 NOTE — CARE UPDATE
08/04/22 1100   Patient Assessment/Suction   Level of Consciousness (AVPU) responds to pain   Respiratory Effort Unlabored   Expansion/Accessory Muscles/Retractions no use of accessory muscles;no retractions   All Lung Fields Breath Sounds coarse   Rhythm/Pattern, Respiratory assisted mechanically   Cough Frequency with stimulation   Cough Type assisted   PRE-TX-O2   O2 Device (Oxygen Therapy) ventilator   $ Is the patient on Low Flow Oxygen? Yes   Oxygen Concentration (%) 25   SpO2 100 %   Pulse Oximetry Type Continuous   Pulse 60   Resp 16   BP 98/61        Surgical Airway Portex Cuffed   No placement date or time found.   Present Prior to Hospital Arrival?: Yes  Type: Tracheostomy  Brand: Portex  Airway Device Size: 8.0  Style: Cuffed   Cuff Pressure   (mlt)   Cuff Inflation? Inflated   Status Secured   Site Assessment Clean;Dry   Site Care Open to air   Inner Cannula Care Other (Comment)   Ties Assessment Clean;Dry   Vent Select   Conventional Vent Y   Charged w/in last 24h YES   Preset Conventional Ventilator Settings   Vent ID 7   Vent Type    Ventilation Type VC   Vent Mode A/C   Set Rate 16 BPM   Vt Set 450 mL   PEEP/CPAP 5 cmH20   Peak Flow 60 L/min   Peak End Inspiratory Pressure 30 cmH20   I-Trigger Type  V-TRIG   Trigger Sensitivity Flow/I-Trigger 2 L/min   Patient Ventilator Parameters   Resp Rate Total 16 br/min   Peak Airway Pressure 38 cmH2O   Mean Airway Pressure 12 cmH20   Plateau Pressure 34 cmH20   Exhaled Vt 445 mL   Total Ve 7.14 mL   I:E Ratio Measured 1:3.60   Auto PEEP 0 cmH20   Conventional Ventilator Alarms   Resp Rate High Alarm 40 br/min   Press High Alarm 60 cmH2O   Apnea Rate 16   Apnea Volume (mL) 0 mL   Apnea Oxygen Concentration  100   Apnea Flow Rate (L/min) 60   T Apnea 20 sec(s)   Ready to Wean/Extubation Screen   FIO2<=50 (chart decimal) 0.25   MV<16L (chart vol.) 7.14   PEEP <=8 (chart #) 5   Ready to Wean Parameters   F/VT Ratio<105 (RSBI) (!) 35.96   Vital Capacity  (mL) 0   Education   $ Education 15 min;Ventilator Oxygen   Respiratory Evaluation   $ Care Plan Tech Time 15 min   $ Eval/Re-eval Charges Re-evaluation   Evaluation For   (vent check)

## 2022-08-04 NOTE — TELEPHONE ENCOUNTER
I called and spoke to Delon at [facility] and she stated that Dr. Gordon Miller is referring patient for Gallbladder problems.  Patient is on a ventillator and I informed her that I'll discuss with Dr. Huynh if he can see her.  I'll call her back to advise. Ariel

## 2022-08-04 NOTE — CARE UPDATE
08/04/22 1325   Patient Assessment/Suction   Level of Consciousness (AVPU) alert   Respiratory Effort Normal;Unlabored   Expansion/Accessory Muscles/Retractions no use of accessory muscles   All Lung Fields Breath Sounds coarse   Rhythm/Pattern, Respiratory assisted mechanically   Cough Frequency with stimulation   Cough Type assisted   Suction Method oral;tracheal   Suction Pressure (mmHg) -120 mmHg   $ Suction Charges Inline Suction Procedure Stat Charge   Secretions Amount small   Secretions Color yellow   PRE-TX-O2   O2 Device (Oxygen Therapy) ventilator   Oxygen Concentration (%) 25   SpO2 99 %   Pulse Oximetry Type Continuous   $ Pulse Oximetry - Single Charge Pulse Oximetry - Single   Pulse 64   Resp 16   Aerosol Therapy   $ Aerosol Therapy Charges Aerosol Treatment   Daily Review of Necessity (SVN) completed   Respiratory Treatment Status (SVN) given   Treatment Route (SVN) in-line   Patient Position (SVN) HOB elevated   Post Treatment Assessment (SVN) breath sounds unchanged   Signs of Intolerance (SVN) none        Surgical Airway Portex Cuffed   No placement date or time found.   Present Prior to Hospital Arrival?: Yes  Type: Tracheostomy  Brand: Portex  Airway Device Size: 8.0  Style: Cuffed   Cuff Pressure   (MLT)   Cuff Inflation? Inflated   Status Secured   Site Assessment Clean;Dry   Site Care Cleansed;Dried   Inner Cannula Care Cleansed/dried   Ties Assessment Clean;Dry   Vent Select   Conventional Vent Y   Charged w/in last 24h YES   Preset Conventional Ventilator Settings   Ventilation Type VC   Vent Mode A/C   Set Rate 16 BPM   Vt Set 450 mL   PEEP/CPAP 5 cmH20   Peak Flow 60 L/min   Peak End Inspiratory Pressure 32 cmH20   I-Trigger Type  V-TRIG   Trigger Sensitivity Flow/I-Trigger 2 L/min   Patient Ventilator Parameters   Resp Rate Total 16 br/min   Peak Airway Pressure 43 cmH2O   Mean Airway Pressure 14 cmH20   Plateau Pressure 34 cmH20   Exhaled Vt 410 mL   Total Ve 6.58 mL   I:E Ratio  Measured 1:3.6   Auto PEEP 0 cmH20   Conventional Ventilator Alarms   Resp Rate High Alarm 40 br/min   Press High Alarm 60 cmH2O   Apnea Rate 16   Apnea Volume (mL) 0 mL   Apnea Oxygen Concentration  100   Apnea Flow Rate (L/min) 60   T Apnea 20 sec(s)   Delay Alarm (Sec) 20 sec(s)   IHI Ventilator Associated Pneumonia Bundle (Required)   Daily Awakening Trials Performed Not applicable   Daily Assessment of Readiness to Extubate No (Comment)   Head of Bed Elevated  HOB 30   Oral Care Lip moisturizer applied;Mouth swabbed;Mouth moisturizer;Mouth suctioned   Vent Circut Breaks Minimized Yes   Ready to Wean/Extubation Screen   FIO2<=50 (chart decimal) 0.25   MV<16L (chart vol.) 6.58   PEEP <=8 (chart #) 5   Ready to Wean Parameters   F/VT Ratio<105 (RSBI) (!) 39.02   Education   $ Education 15 min;Trach Care;Ventilator Oxygen   Respiratory Evaluation   $ Care Plan Tech Time 15 min   $ Eval/Re-eval Charges Evaluation   Evaluation For   (VENT CHCK)

## 2022-08-04 NOTE — CARE UPDATE
08/03/22 1951   Patient Assessment/Suction   Level of Consciousness (AVPU) responds to pain   Respiratory Effort Normal;Unlabored   Expansion/Accessory Muscles/Retractions no use of accessory muscles   All Lung Fields Breath Sounds Anterior:;coarse   RUL Breath Sounds coarse   Rhythm/Pattern, Respiratory assisted mechanically   Cough Frequency with stimulation   Cough Type assisted   Suction Method oral;tracheal   Suction Pressure (mmHg) -120 mmHg   $ Suction Charges Inline Suction Procedure Stat Charge   Secretions Amount small   Secretions Color white   Secretions Characteristics thin   PRE-TX-O2   O2 Device (Oxygen Therapy) ventilator   $ Is the patient on Low Flow Oxygen? Yes   Oxygen Concentration (%) 25   SpO2 100 %   Pulse Oximetry Type Continuous   $ Pulse Oximetry - Multiple Charge Pulse Oximetry - Multiple   Pulse 78   Resp 18   Aerosol Therapy   $ Aerosol Therapy Charges Aerosol Treatment   Daily Review of Necessity (SVN) completed   Respiratory Treatment Status (SVN) given   Treatment Route (SVN) in-line;ventilator   Patient Position (SVN) HOB elevated   Post Treatment Assessment (SVN) breath sounds unchanged   Signs of Intolerance (SVN) none   Breath Sounds Post-Respiratory Treatment   Post-treatment Heart Rate (beats/min) 62   Post-treatment Resp Rate (breaths/min) 18   Vent Select   Conventional Vent Y   Charged w/in last 24h YES   Preset Conventional Ventilator Settings   Vent ID 7   Vent Type    Ventilation Type VC   Vent Mode A/C   Humidity HME   Set Rate 16 BPM   Vt Set 450 mL   PEEP/CPAP 5 cmH20   Peak Flow 60 L/min   Peak End Inspiratory Pressure 35 cmH20   I-Trigger Type  V-TRIG   Trigger Sensitivity Flow/I-Trigger 2 L/min   Patient Ventilator Parameters   Resp Rate Total 19 br/min   Peak Airway Pressure 36 cmH2O   Mean Airway Pressure 13 cmH20   Plateau Pressure 30 cmH20   Exhaled Vt 513 mL   Total Ve 8.84 mL   I:E Ratio Measured 1:3.60   Auto PEEP 0 cmH20   Tubing ID (mm) 8 mm   Tube  Type Trach   Conventional Ventilator Alarms   Alarms On Y   Resp Rate High Alarm 40 br/min   Press High Alarm 60 cmH2O   Apnea Rate 16   Apnea Volume (mL) 0 mL   Apnea Oxygen Concentration  100   Apnea Flow Rate (L/min) 60   T Apnea 20 sec(s)   IHI Ventilator Associated Pneumonia Bundle (Required)   Head of Bed Elevated  HOB 30   Oral Care Mouth swabbed;Mouth suctioned;Oral suctioning prior to turn   Ready to Wean/Extubation Screen   FIO2<=50 (chart decimal) 0.25   MV<16L (chart vol.) 8.84   PEEP <=8 (chart #) 5   Ready to Wean Parameters   F/VT Ratio<105 (RSBI) (!) 35.09   Vital Capacity (mL) 0   Education   $ Education Ventilator Oxygen;15 min   Respiratory Evaluation   $ Care Plan Tech Time 15 min

## 2022-08-04 NOTE — CARE UPDATE
08/04/22 1711   Patient Assessment/Suction   Level of Consciousness (AVPU) responds to pain   Respiratory Effort Unlabored   Expansion/Accessory Muscles/Retractions no use of accessory muscles   All Lung Fields Breath Sounds Anterior:;coarse   Rhythm/Pattern, Respiratory unlabored;pattern regular;assisted mechanically   Cough Frequency with stimulation   Cough Type assisted   Suction Method oral;tracheal   Suction Pressure (mmHg) -120 mmHg   $ Suction Charges Inline Suction Procedure Stat Charge   Secretions Amount small   Secretions Color yellow   PRE-TX-O2   O2 Device (Oxygen Therapy) ventilator   $ Is the patient on Low Flow Oxygen? Yes   Oxygen Concentration (%) 25   SpO2 100 %   Pulse Oximetry Type Continuous   $ Pulse Oximetry - Single Charge Pulse Oximetry - Single   Pulse 68   Resp 16        Surgical Airway Portex Cuffed   No placement date or time found.   Present Prior to Hospital Arrival?: Yes  Type: Tracheostomy  Brand: Portex  Airway Device Size: 8.0  Style: Cuffed   Cuff Pressure   (mlt)   Cuff Inflation? Inflated   Speaking Valve Usage Not wearing   Status Secured   Site Assessment No bleeding;Dry;Clean   Site Care Dried;Cleansed;Dressing applied   Ties Assessment Dry;Intact;Secure   Airway Safety   Ambu bag with the patient? Yes, Adult Ambu   Is mask with the patient? Yes, Adult Mask   Extra trach at bedside? Yes   Respiratory Interventions   Airway/Ventilation Management airway patency maintained   Airway/Ventilation Support humidification applied   Vent Select   Conventional Vent Y   Charged w/in last 24h YES   Preset Conventional Ventilator Settings   Vent ID [id number]   Vent Type    Ventilation Type VC   Vent Mode A/C   Humidity HME   Set Rate 16 BPM   Vt Set 450 mL   PEEP/CPAP 5 cmH20   Peak Flow 60 L/min   Peak End Inspiratory Pressure 31 cmH20   I-Trigger Type  V-TRIG   Trigger Sensitivity Flow/I-Trigger 2 L/min   Patient Ventilator Parameters   Resp Rate Total 16 br/min   Peak Airway  Pressure 34 cmH2O   Mean Airway Pressure 11 cmH20   Plateau Pressure 34 cmH20   Exhaled Vt 446 mL   Total Ve 7.12 mL   I:E Ratio Measured 1:3.60   Auto PEEP 0 cmH20   Conventional Ventilator Alarms   Alarms On Y   Resp Rate High Alarm 40 br/min   Press High Alarm 60 cmH2O   Apnea Rate 16   Apnea Volume (mL) 0 mL   Apnea Oxygen Concentration  100   Apnea Flow Rate (L/min) 60   T Apnea 20 sec(s)   IHI Ventilator Associated Pneumonia Bundle (Required)   Daily Awakening Trials Performed No   Daily Assessment of Readiness to Extubate Yes   Head of Bed Elevated  HOB 30   Oral Care Lip moisturizer applied;Mouth swabbed;Mouth moisturizer   Vent Circut Breaks Minimized Yes   Ready to Wean/Extubation Screen   FIO2<=50 (chart decimal) 0.25   MV<16L (chart vol.) 7.12   PEEP <=8 (chart #) 5   Ready to Wean Parameters   F/VT Ratio<105 (RSBI) (!) 35.87   Vital Capacity (mL) 0   Education   $ Education Trach Care;Ventilator Oxygen;30 min   Respiratory Evaluation   $ Care Plan Tech Time 30 min   $ Eval/Re-eval Charges Re-evaluation   Evaluation For   (trach care)

## 2022-08-04 NOTE — PLAN OF CARE
08/04/22 0928   Discharge Reassessment   Assessment Type Discharge Planning Reassessment   Did the patient's condition or plan change since previous assessment? No   Discharge Plan discussed with: Sibling   Name(s) and Number(s) Amelia Hidalgo [phone]   Communicated [place] with patient/caregiver Date not available/Unable to determine   Discharge Plan A Return to nursing home   Discharge Plan B Return to Nursing Home   DME Needed Upon Discharge  none   Discharge Barriers Identified None   Why the patient remains in the hospital Requires continued medical care   Post-Acute Status   Post-Acute Authorization Placement   Post-Acute Placement Status Pending medical clearance/testing   Coverage medicaid   Hospital Resources/Appts/Education Provided Post-Acute resouces added to AVS   Discharge Delays None known at this time   Spoke to sister Amelia [phone] who would like patient returned to [facility] when ready for discharge.

## 2022-08-04 NOTE — PROGRESS NOTES
Pulmonary/Critical Care  Progress Note      Patient name: Genna Felix  MRN: [id number]  Date: 08/04/2022    Admit Date: 7/29/2022  Consult Requested By: Tee Finn MD    Reason for Consult: Respiratory failure, chronic, aspiration    HPI:    7/29/2022 - 62 yo with h/o GERD, HTN, PE, trach, vent, anoxic brain injury sent to ER from [place] for coughing and possible aspiration.  In ER was hypothermic, procalcitonin was low, UA showed UTI.  CT chest reviewed and shows chronic changes, a nodule and some inflammatory changes.  Has QTc prolongation.  I ICU was hypotensive despite fluid bolus and pressors are being started.  I d./w her sister and updated her and attempted to call her daughter to discuss code status but no answer.    8/1/2022 - Events of weekend noted, no distress.  Remain unresponsive.  Has a lot of purulent secretions from trach.  Nursing reports that family has not visited.    8/2/2022 - Stable overnight, still with episodes of hypothermia.  No new respiratory issues.  O2 needs have decreased.  H/H has decreased.  CXR is better - ? If she had a component of pulmonary edema    8/3/2022 - Stable overnight but still with hypothermia on Bairhugger.  No new issues reported.  By report no family has visited.    8/4/2022 - Stable overnight but has remained hypothermic and is on Bairhugger.  She remains unresponsive with eyes open.  No new issues reported by nursing.  Cortisol level OK, stimulation test has been ordered    Review of Systems    Review of Systems   Unable to perform ROS: Mental acuity       Past Medical History    Past Medical History:   Diagnosis Date    Anoxic brain damage 07/2020    Bedbound 07/2020    Cardiac arrest 07/2020    Cirrhosis     GERD (gastroesophageal reflux disease)     Hemangioma of intra-abdominal structure     Hypertension     Nursing home resident     Protein calorie malnutrition     Pulmonary embolus     Respiratory distress     S/P percutaneous endoscopic  gastrostomy (PEG) tube placement 07/2020    Total self-care deficit 07/2020    Tracheostomy dependence     7/2020       Past Surgical History    Past Surgical History:   Procedure Laterality Date    PEG W/TRACHEOSTOMY PLACEMENT  07/27/2020    SKIN GRAFT      buttocks       Medications (scheduled):      acetylcysteine 200 mg/ml (20%)  4 mL Nebulization Q6H WAKE    albuterol-ipratropium  3 mL Nebulization Q6H WAKE    carboxymethylcellulose  1 drop Ophthalmic QID    ceftolozane-tazobactam  1,500 mg Intravenous Q8H    chlorhexidine  15 mL Mouth/Throat BID    ciprofloxacin  400 mg Intravenous Q12H    [START ON 8/5/2022] cosyntropin  0.25 mg Intravenous Once    enoxparin  40 mg Subcutaneous Q24H    famotidine  20 mg Per G Tube BID    midodrine  10 mg Per G Tube TID    mupirocin   Nasal BID    oxybutynin  5 mg Oral BID       Medications (infusions):         Medications (prn):     sodium chloride, acetaminophen, carboxymethylcellulose sodium, dextrose 10%, dextrose 10%, magnesium oxide, magnesium oxide, potassium bicarbonate, potassium bicarbonate, potassium bicarbonate, potassium, sodium phosphates, potassium, sodium phosphates, potassium, sodium phosphates    Family History:   Family History   Problem Relation Age of Onset    No Known Problems Mother     No Known Problems Father        Social History: Tobacco:   Social History     Tobacco Use   Smoking Status Never Smoker   Smokeless Tobacco Never Used                                EtOH:   Social History     Substance and Sexual Activity   Alcohol Use Not Currently                                Drugs:   Social History     Substance and Sexual Activity   Drug Use Not Currently         Physical Exam    Vital signs:  Temp:  [96 °F (35.6 °C)-98.1 °F (36.7 °C)]   Pulse:  [62-96]   Resp:  [16-44]   BP: ()/(52-74)   SpO2:  [88 %-100 %]     Intake/Output:     Intake/Output Summary (Last 24 hours) at 8/4/2022 0939  Last data filed at 8/4/2022 0701  Gross  per 24 hour   Intake 2437 ml   Output 2695 ml   Net -258 ml        BMI: Estimated body mass index is 32.98 kg/m² as calculated from the following:    Height as of this encounter: 5' (1.524 m).    Weight as of this encounter: 76.6 kg (168 lb 14 oz).    Physical Exam  Vitals and nursing note reviewed.   Constitutional:       General: She is not in acute distress.     Appearance: She is ill-appearing (chronically). She is not toxic-appearing or diaphoretic.      Comments: Eyes open, no response and does not fix on objects   HENT:      Head: Normocephalic and atraumatic.      Right Ear: External ear normal.      Left Ear: External ear normal.      Nose: Nose normal. No congestion or rhinorrhea.      Mouth/Throat:      Mouth: Mucous membranes are moist.      Pharynx: Oropharynx is clear. No oropharyngeal exudate or posterior oropharyngeal erythema.   Eyes:      General: No scleral icterus.        Right eye: No discharge.         Left eye: No discharge.      Extraocular Movements: Extraocular movements intact.      Conjunctiva/sclera: Conjunctivae normal.      Pupils: Pupils are equal, round, and reactive to light.      Comments: scleritis   Neck:      Vascular: No carotid bruit.      Comments: Trach   Cardiovascular:      Rate and Rhythm: Normal rate and regular rhythm.      Pulses: Normal pulses.      Heart sounds: No murmur heard.    No friction rub. No gallop.   Pulmonary:      Effort: Pulmonary effort is normal. No respiratory distress.      Breath sounds: No stridor. Rhonchi present. No wheezing or rales.      Comments: Ventilated   Chest:      Chest wall: No tenderness.   Abdominal:      General: There is no distension.      Tenderness: There is no abdominal tenderness. There is no guarding.      Comments: PEG  Biliary drain in place  Hypoactive BS   Genitourinary:     Comments: carroll  Musculoskeletal:         General: No swelling. Normal range of motion.      Cervical back: Normal range of motion and neck supple.  No rigidity or tenderness.      Right lower leg: No edema.      Left lower leg: No edema.      Comments: + contractures   Lymphadenopathy:      Cervical: No cervical adenopathy.   Skin:     General: Skin is warm and dry.      Coloration: Skin is not jaundiced.      Findings: No bruising.   Neurological:      Comments: Unresponsive  Chronic changes  No acute changes reported   Psychiatric:      Comments: Calm          Laboratory    Recent Labs   Lab 08/04/22  0206   WBC 5.54   RBC 3.28*   HGB 8.8*   HCT 28.2*      MCV 86   MCH 26.8*   MCHC 31.2*       Recent Labs   Lab 08/04/22  0206   CALCIUM 8.6*   PROT 7.7      K 3.7   CO2 27      BUN 12   CREATININE <0.3*   ALKPHOS 189*   ALT 58*   AST 35   BILITOT 0.6       No results for input(s): PT, INR, APTT in the last 24 hours.    No results for input(s): CPK, CPKMB, TROPONINI, MB in the last 24 hours.    Additional labs: reviewed    Microbiology:       Microbiology Results (last 7 days)     Procedure Component Value Units Date/Time    Blood culture #2 **CANNOT BE ORDERED STAT** [[id number]] Collected: 07/29/22 0345    Order Status: Completed Specimen: Blood from Peripheral, Forearm, Right Updated: 08/03/22 0432     Blood Culture, Routine No growth after 5 days.    Blood culture #1 **CANNOT BE ORDERED STAT** [[id number]] Collected: 07/29/22 0320    Order Status: Completed Specimen: Blood from Peripheral, Hand, Right Updated: 08/03/22 0432     Blood Culture, Routine No growth after 5 days.    Culture, Respiratory with Gram Stain [[id number]]  (Abnormal)  (Susceptibility) Collected: 07/29/22 0306    Order Status: Completed Specimen: Respiratory from Tracheal Aspirate Updated: 08/01/22 1228     Respiratory Culture PSEUDOMONAS AERUGINOSA  Moderate        PROTEUS MIRABILIS ESBL  Moderate  Results called to and read back by RN M3 ANKIT HOLDSWORTH  07/31/2022    09:37 JT       Comment: Previous value was PPR verified by JJT at 08:37 on 07/30/2022  Previous  comment was modified by JACIEL at 06:29 on 07/31/2022   Identification and susceptibility pending          Gram Stain (Respiratory) Many WBC's     Gram Stain (Respiratory) <10 epithelial cells per low power field.     Gram Stain (Respiratory) Few Gram negative rods    Urine culture [[id number]] Collected: 07/29/22 0545    Order Status: Completed Specimen: Urine Updated: 07/30/22 0751     Urine Culture, Routine Multiple organisms isolated. None in predominance.  Repeat if      clinically necessary.    Narrative:      Specimen Source->Urine    Urine Culture High Risk [[id number]]     Order Status: Completed Specimen: Urine, Catheterized     Urine Culture High Risk [[id number]]     Order Status: Canceled Specimen: Urine, Catheterized           Radiology    X-Ray Chest 1 View  Portable chest x-ray 4:52 AM is compared to prior study dated 7/30/2022    Clinical history is pneumonia    The tracheostomy tube and right subclavian vein catheter are in stable position.  The cardiomediastinal silhouette is normal in size.    There is improvement of the interstitial opacities suggestive of resolving edema or pneumonia. There are no new infiltrates or pleural effusions.    IMPRESSION: Improvement of the interstitial opacities suggestive of resolving edema or pneumonia    Electronically signed by:  Jazmin Patton MD  8/2/2022 6:56 AM CDT Workstation: [id number]        Additional Studies    reviewed    Ventilator Information    Vent Mode: A/C  Oxygen Concentration (%):  [25] 25  Resp Rate Total:  [16 br/min-23 br/min] 16 br/min  Vt Set:  [450 mL] 450 mL  PEEP/CPAP:  [5 cmH20] 5 cmH20  Mean Airway Pressure:  [11 rdL31-01 cmH20] 11 cmH20         No results for input(s): PH, PCO2, PO2, HCO3, POCSATURATED, BE in the last 72 hours.      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  Added automatically from request for surgery [id number]        Resolved Hospital Problems    Diagnosis Date Resolved POA    Shock, unspecified [R57.9] 08/03/2022 Yes       Plan    · Continue vent support - vent dependent  · Antibiotics per ID  · Contact isolation  · Nebs and mucomyst to help with secretions  · I suspect hypothermia may be central in origin   · ? transfer back to [place] soon    Thank you for this consult.  I will follow with you while the patient is hospitalized.          Reji Andrews MD  [facility] Pulmonary/Critical Care

## 2022-08-04 NOTE — TELEPHONE ENCOUNTER
----- Message from Lisa Brunson sent at 8/4/2022  2:36 PM CDT -----  Type: Needs Medical Advice  Who Called:  Delon with [facility]  Symptoms (please be specific):    How long has patient had these symptoms:    Pharmacy name and phone #:    Best Call Back Number:   Additional Information: [facility] is requesting a call back to schedule Hosp. f/u for patient.

## 2022-08-05 VITALS
DIASTOLIC BLOOD PRESSURE: 65 MMHG | TEMPERATURE: 99 F | HEART RATE: 67 BPM | SYSTOLIC BLOOD PRESSURE: 97 MMHG | RESPIRATION RATE: 16 BRPM | WEIGHT: 168.88 LBS | OXYGEN SATURATION: 99 % | HEIGHT: 60 IN | BODY MASS INDEX: 33.15 KG/M2

## 2022-08-05 LAB
ALBUMIN SERPL BCP-MCNC: 2.6 G/DL (ref 3.5–5.2)
ALP SERPL-CCNC: 176 U/L (ref 55–135)
ALT SERPL W/O P-5'-P-CCNC: 53 U/L (ref 10–44)
ANION GAP SERPL CALC-SCNC: 4 MMOL/L (ref 8–16)
AST SERPL-CCNC: 31 U/L (ref 10–40)
BILIRUB SERPL-MCNC: 0.7 MG/DL (ref 0.1–1)
BUN SERPL-MCNC: 16 MG/DL (ref 6–20)
CALCIUM SERPL-MCNC: 8.6 MG/DL (ref 8.7–10.5)
CHLORIDE SERPL-SCNC: 102 MMOL/L (ref 95–110)
CO2 SERPL-SCNC: 30 MMOL/L (ref 23–29)
CORTIS SERPL-MCNC: 22.2 UG/DL
CORTIS SERPL-MCNC: 26 UG/DL
CREAT SERPL-MCNC: <0.3 MG/DL (ref 0.5–1.4)
ERYTHROCYTE [DISTWIDTH] IN BLOOD BY AUTOMATED COUNT: 18 % (ref 11.5–14.5)
EST. GFR  (NO RACE VARIABLE): >60 ML/MIN/1.73 M^2
GLUCOSE SERPL-MCNC: 127 MG/DL (ref 70–110)
HCT VFR BLD AUTO: 30.4 % (ref 37–48.5)
HGB BLD-MCNC: 9.3 G/DL (ref 12–16)
MAGNESIUM SERPL-MCNC: 1.6 MG/DL (ref 1.6–2.6)
MCH RBC QN AUTO: 26.5 PG (ref 27–31)
MCHC RBC AUTO-ENTMCNC: 30.6 G/DL (ref 32–36)
MCV RBC AUTO: 87 FL (ref 82–98)
PLATELET # BLD AUTO: 230 K/UL (ref 150–450)
PMV BLD AUTO: 10.5 FL (ref 9.2–12.9)
POTASSIUM SERPL-SCNC: 4.1 MMOL/L (ref 3.5–5.1)
PROT SERPL-MCNC: 7.7 G/DL (ref 6–8.4)
RBC # BLD AUTO: 3.51 M/UL (ref 4–5.4)
SARS-COV-2 RDRP RESP QL NAA+PROBE: NEGATIVE
SODIUM SERPL-SCNC: 136 MMOL/L (ref 136–145)
WBC # BLD AUTO: 7.7 K/UL (ref 3.9–12.7)

## 2022-08-05 PROCEDURE — 85027 COMPLETE CBC AUTOMATED: CPT | Performed by: INTERNAL MEDICINE

## 2022-08-05 PROCEDURE — 25000003 PHARM REV CODE 250: Performed by: INTERNAL MEDICINE

## 2022-08-05 PROCEDURE — 99233 PR SUBSEQUENT HOSPITAL CARE,LEVL III: ICD-10-PCS | Mod: ,,, | Performed by: INTERNAL MEDICINE

## 2022-08-05 PROCEDURE — 99900026 HC AIRWAY MAINTENANCE (STAT)

## 2022-08-05 PROCEDURE — 94640 AIRWAY INHALATION TREATMENT: CPT

## 2022-08-05 PROCEDURE — 99900031 HC PATIENT EDUCATION (STAT)

## 2022-08-05 PROCEDURE — 94761 N-INVAS EAR/PLS OXIMETRY MLT: CPT

## 2022-08-05 PROCEDURE — 99233 SBSQ HOSP IP/OBS HIGH 50: CPT | Mod: ,,, | Performed by: INTERNAL MEDICINE

## 2022-08-05 PROCEDURE — 83735 ASSAY OF MAGNESIUM: CPT | Performed by: INTERNAL MEDICINE

## 2022-08-05 PROCEDURE — 94799 UNLISTED PULMONARY SVC/PX: CPT

## 2022-08-05 PROCEDURE — 80053 COMPREHEN METABOLIC PANEL: CPT | Performed by: INTERNAL MEDICINE

## 2022-08-05 PROCEDURE — 25000003 PHARM REV CODE 250: Performed by: STUDENT IN AN ORGANIZED HEALTH CARE EDUCATION/TRAINING PROGRAM

## 2022-08-05 PROCEDURE — 94003 VENT MGMT INPAT SUBQ DAY: CPT

## 2022-08-05 PROCEDURE — 63600175 PHARM REV CODE 636 W HCPCS: Performed by: INTERNAL MEDICINE

## 2022-08-05 PROCEDURE — 82533 TOTAL CORTISOL: CPT | Mod: 91 | Performed by: INTERNAL MEDICINE

## 2022-08-05 PROCEDURE — U0002 COVID-19 LAB TEST NON-CDC: HCPCS | Performed by: INTERNAL MEDICINE

## 2022-08-05 PROCEDURE — 25000242 PHARM REV CODE 250 ALT 637 W/ HCPCS: Performed by: INTERNAL MEDICINE

## 2022-08-05 PROCEDURE — 27000221 HC OXYGEN, UP TO 24 HOURS

## 2022-08-05 PROCEDURE — 25000003 PHARM REV CODE 250

## 2022-08-05 PROCEDURE — 63600175 PHARM REV CODE 636 W HCPCS: Mod: JG | Performed by: STUDENT IN AN ORGANIZED HEALTH CARE EDUCATION/TRAINING PROGRAM

## 2022-08-05 PROCEDURE — 99900035 HC TECH TIME PER 15 MIN (STAT)

## 2022-08-05 RX ADMIN — MIDODRINE HYDROCHLORIDE 10 MG: 2.5 TABLET ORAL at 09:08

## 2022-08-05 RX ADMIN — CEFTOLOZANE AND TAZOBACTAM 1500 MG: 1; .5 INJECTION, POWDER, LYOPHILIZED, FOR SOLUTION INTRAVENOUS at 01:08

## 2022-08-05 RX ADMIN — CHLORHEXIDINE GLUCONATE 15 ML: 1.2 RINSE ORAL at 09:08

## 2022-08-05 RX ADMIN — CIPROFLOXACIN 400 MG: 2 INJECTION, SOLUTION INTRAVENOUS at 03:08

## 2022-08-05 RX ADMIN — CARBOXYMETHYLCELLULOSE SODIUM 1 DROP: 0.5 SOLUTION, GEL FORMING / DROPS OPHTHALMIC at 01:08

## 2022-08-05 RX ADMIN — ENOXAPARIN SODIUM 40 MG: 40 INJECTION SUBCUTANEOUS at 09:08

## 2022-08-05 RX ADMIN — MIDODRINE HYDROCHLORIDE 10 MG: 2.5 TABLET ORAL at 03:08

## 2022-08-05 RX ADMIN — IPRATROPIUM BROMIDE AND ALBUTEROL SULFATE 3 ML: .5; 3 SOLUTION RESPIRATORY (INHALATION) at 07:08

## 2022-08-05 RX ADMIN — COSYNTROPIN 0.25 MG: 0.25 INJECTION, POWDER, LYOPHILIZED, FOR SOLUTION INTRAMUSCULAR; INTRAVENOUS at 09:08

## 2022-08-05 RX ADMIN — CARBOXYMETHYLCELLULOSE SODIUM 1 DROP: 0.5 SOLUTION, GEL FORMING / DROPS OPHTHALMIC at 09:08

## 2022-08-05 RX ADMIN — OXYBUTYNIN CHLORIDE 5 MG: 5 TABLET ORAL at 09:08

## 2022-08-05 RX ADMIN — Medication 800 MG: at 05:08

## 2022-08-05 RX ADMIN — CIPROFLOXACIN 400 MG: 2 INJECTION, SOLUTION INTRAVENOUS at 02:08

## 2022-08-05 RX ADMIN — ACETYLCYSTEINE 4 ML: 200 INHALANT RESPIRATORY (INHALATION) at 02:08

## 2022-08-05 RX ADMIN — ACETYLCYSTEINE 4 ML: 200 INHALANT RESPIRATORY (INHALATION) at 07:08

## 2022-08-05 RX ADMIN — MUPIROCIN 1 G: 20 OINTMENT TOPICAL at 09:08

## 2022-08-05 RX ADMIN — IPRATROPIUM BROMIDE AND ALBUTEROL SULFATE 3 ML: .5; 3 SOLUTION RESPIRATORY (INHALATION) at 01:08

## 2022-08-05 RX ADMIN — CEFTOLOZANE AND TAZOBACTAM 1500 MG: 1; .5 INJECTION, POWDER, LYOPHILIZED, FOR SOLUTION INTRAVENOUS at 05:08

## 2022-08-05 RX ADMIN — FAMOTIDINE 20 MG: 20 TABLET ORAL at 09:08

## 2022-08-05 NOTE — PLAN OF CARE
08/05/22 0736   Patient Assessment/Suction   Level of Consciousness (AVPU) responds to pain   Respiratory Effort Unlabored   Expansion/Accessory Muscles/Retractions no retractions   All Lung Fields Breath Sounds Anterior:;Lateral:   MAYNOR Breath Sounds coarse   LLL Breath Sounds coarse   RUL Breath Sounds coarse   RML Breath Sounds coarse   RLL Breath Sounds coarse   Rhythm/Pattern, Respiratory assisted mechanically   Cough Frequency with stimulation   Cough Type good;productive   Suction Method oral;tracheal   Suction Pressure (mmHg) -120 mmHg   $ Suction Charges Inline Suction Procedure Stat Charge   Secretions Amount moderate   Secretions Color yellow;white   Secretions Characteristics thick   Aspirate Toleration TENA (no adverse reactions)   PRE-TX-O2   O2 Device (Oxygen Therapy) ventilator   $ Is the patient on Low Flow Oxygen? Yes   Oxygen Concentration (%) 25   SpO2 100 %   Pulse Oximetry Type Continuous   $ Pulse Oximetry - Multiple Charge Pulse Oximetry - Multiple   Oximetry Probe Site Assessed;Intact   Pulse 93   Resp (!) 23   Positioning HOB elevated 30 degrees   Aerosol Therapy   $ Aerosol Therapy Charges Aerosol Treatment   Daily Review of Necessity (SVN) completed   Respiratory Treatment Status (SVN) given   Treatment Route (SVN) ventilator;in-line   Patient Position (SVN) HOB elevated   Post Treatment Assessment (SVN) breath sounds improved   Signs of Intolerance (SVN) none   Breath Sounds Post-Respiratory Treatment   Throughout All Fields Post-Treatment All Fields   Throughout All Fields Post-Treatment aeration increased   Post-treatment Heart Rate (beats/min) 100   Post-treatment Resp Rate (breaths/min) 19        Surgical Airway Portex Cuffed   No placement date or time found.   Present Prior to Hospital Arrival?: Yes  Type: Tracheostomy  Brand: Portex  Airway Device Size: 8.0  Style: Cuffed   Cuff Pressure   (MLT)   Cuff Inflation? Inflated   Status Secured   Site Assessment Clean;Dry;No bleeding    Site Care Cleansed;Dried   Ties Assessment Dry;Intact;Clean   [REMOVED]      Surgical Airway Portex Cuffed   Removal Date: 08/05/22  No placement date or time found.   Present Prior to Hospital Arrival?: Yes  Brand: Portex  Airway Device Size: 8.0  Style: Cuffed   Cuff Pressure   (MLT)   General Safety Checklist   Safety Promotion/Fall Prevention side rails raised   Airway Safety   Ambu bag with the patient? Yes, Adult Ambu   Is mask with the patient? Yes, Adult Mask   Suction set is at the bedside? Yes   ETT at Bedside? Yes   ETT Size 6   Extra trach at bedside? Yes   Extra trach sizes at bedside? 6   Is obturator available? Yes   Location of obturator?  Head of bed   Vent Select   Conventional Vent Y   $ Ventilator Subsequent 1   Charged w/in last 24h YES   Preset Conventional Ventilator Settings   Vent ID 7   Vent Type    Ventilation Type VC   Vent Mode A/C   Humidity HME   Set Rate 16 BPM   Vt Set 450 mL   PEEP/CPAP 5 cmH20   Peak Flow 60 L/min   Peak End Inspiratory Pressure 29 cmH20   I-Trigger Type  V-TRIG   Trigger Sensitivity Flow/I-Trigger 2 L/min   Patient Ventilator Parameters   Resp Rate Total 19 br/min   Peak Airway Pressure 34 cmH2O   Mean Airway Pressure 12 cmH20   Plateau Pressure 34 cmH20   Exhaled Vt 469 mL   Total Ve 8.56 mL   I:E Ratio Measured 1:3.10   Auto PEEP 0 cmH20   Tubing ID (mm) 8 mm   Tube Type Trach   Conventional Ventilator Alarms   Alarms On Y   Ve High Alarm 20 L/min   Ve Low Alarm 4 L/min   Vt High Alarm 1200 mL   Vt Low Alarm 300 mL   Resp Rate High Alarm 40 br/min   Press High Alarm 60 cmH2O   Apnea Rate 16   Apnea Volume (mL) 0 mL   Apnea Oxygen Concentration  100   Apnea Flow Rate (L/min) 60   T Apnea 20 sec(s)   IHI Ventilator Associated Pneumonia Bundle (Required)   Daily Awakening Trials Performed Not applicable   Head of Bed Elevated  HOB 30   Vent Circut Breaks Minimized Yes   Ready to Wean/Extubation Screen   FIO2<=50 (chart decimal) 0.25   MV<16L (chart vol.)  8.56   PEEP <=8 (chart #) 5   Ready to Wean Parameters   F/VT Ratio<105 (RSBI) (!) 49.04   Vital Capacity (mL) 0   Education   $ Education Bronchodilator;15 min   Respiratory Evaluation   $ Care Plan Tech Time 15 min   $ Eval/Re-eval Charges Re-evaluation   Evaluation For Re-Eval 5+ day

## 2022-08-05 NOTE — DISCHARGE SUMMARY
[facility] Discharge Summary    Date of Admit: 7/29/2022  Date of Discharge: 8/5/2022    Discharge to: Another [facility] Care [facility]*  Condition: stable    Discharge Diagnoses     Problem Noted   Aspiration Pneumonia 7/29/2022   Ventilator Dependence 7/29/2022   Cirrhosis of Liver 12/27/2021   Uti (Urinary Tract Infection) 12/27/2021   Chronic Respiratory Failure With Hypoxia 5/10/2021   Tracheostomy Dependence 5/1/2021   Peg (Percutaneous Endoscopic Gastrostomy) Status 5/1/2021   Anemia of Chronic Disease 4/21/2021   Persistent Vegetative State 4/21/2021   Pressure Injury of Left Buttock, Stage 4 11/4/2020    Formatting of this note might be different from the original.  Added automatically from request for surgery [id number]     Shock, Unspecified (Resolved) 7/29/2022        Patient Active Problem List   Diagnosis    Anemia of chronic disease    Persistent vegetative state    Goals of care, counseling/discussion    Tracheostomy dependence    PEG (percutaneous endoscopic gastrostomy) status    Chronic respiratory failure with hypoxia    Malnutrition of moderate degree    Bacteremia    Post hypoxic myoclonus    Essential hypertension    Gastroesophageal reflux disease    Difficult Hamilton catheter placement    Ventilator associated pneumonia    Pressure injury of left buttock, stage 4    Osteoarthritis    On tube feeding diet    Non-restorable tooth    Injury to blood vessel of head    Hx pulmonary embolism    Asthma    Cirrhosis of liver    Deep tissue injury    Dental caries    UTI (urinary tract infection)    Hyponatremia    VAP (ventilator-associated pneumonia)    Pressure injury of right elbow, stage 4    Pseudomonas urinary tract infection    Cholelithiasis with chronic cholecystitis    Aspiration pneumonia    Ventilator dependence        Brief History of Present Illness      Genna Felix is a 61 y.o. female who  has a past medical history of Anoxic brain damage (07/2020),  Bedbound (07/2020), Cardiac arrest (07/2020), Cirrhosis, GERD (gastroesophageal reflux disease), Hemangioma of intra-abdominal structure, Hypertension, Nursing home resident, Protein calorie malnutrition, Pulmonary embolus, Respiratory distress, S/P percutaneous endoscopic gastrostomy (PEG) tube placement (07/2020), Total self-care deficit (07/2020), and Tracheostomy dependence.  The patient presented to [facility] on 7/29/2022 with a primary complaint of Possible Aspiration (Blood and tube feeding noted by [facility] staff after suctioning trach)  .       For the full HPI please refer to the History & Physical from this admission.    Hospital Course     Admitted for treatment of aspiration pneumonia.  Found to have drug-resistant organisms and seen in conjunction with Infectious Disease and Pulmonary/Critical Care.  Treated with antibiotics as per ID recommendations.  She is now back to her baseline.  Will discharge with antibiotics per G-tube to complete course as recommended by Infectious Disease.  Follow up with clinical staff at [facility].    Physical exam     Vitals and nursing note reviewed.   Constitutional:       Appearance: She is ill-appearing.   HENT:      Head: Atraumatic.      Right Ear: External ear normal.      Left Ear: External ear normal.      Nose: Nose normal.      Mouth/Throat:      Mouth: Mucous membranes are moist.   Neck:      Comments: Trach  Cardiovascular:      Rate and Rhythm: Normal rate.   Pulmonary:      Effort: Pulmonary effort is normal.   Abdominal:      Comments: RUQ area drain  PEG tube   Musculoskeletal:         General: No deformity.   Skin:     General: Skin is warm.   Neurological:      Mental Status: She is alert. Mental status is at baseline.      Comments: Trach and vent dependent           Discharge Medications        Medication List      START taking these medications    ciprofloxacin HCl 500 MG tablet  Commonly known as: CIPRO  1 tablet (500 mg total) by Per G Tube route  every 12 (twelve) hours. for 5 days        CHANGE how you take these medications    clonazePAM 0.5 MG tablet  Commonly known as: KlonoPIN  What changed: Another medication with the same name was removed. Continue taking this medication, and follow the directions you see here.        CONTINUE taking these medications    acetaminophen 325 MG tablet  Commonly known as: TYLENOL  2 tablets (650 mg total) by Per G Tube route every 4 (four) hours as needed for Pain or Temperature greater than (100.4F).     acetylcysteine 100 mg/ml (10%) 100 mg/mL (10 %) nebulizer solution  Commonly known as: MUCOMYST  Take 4 mLs by nebulization every 8 (eight) hours.     albuterol-ipratropium 2.5 mg-0.5 mg/3 mL nebulizer solution  Commonly known as: DUO-NEB  Take 3 mLs by nebulization every 6 (six) hours. Rescue     artificial tears 0.5 % ophthalmic solution  Commonly known as: ISOPTO TEARS  Place 1 drop into both eyes as needed.     BENEPROTEIN 6 gram-25 kcal/7 gram Powd  Generic drug: whey protein isolate     chlorhexidine 0.12 % solution  Commonly known as: PERIDEX     ergocalciferol 50,000 unit Cap  Commonly known as: ERGOCALCIFEROL     ferrous sulfate 300 mg (60 mg iron)/5 mL syrup     ISOSOURCE ORAL     XIOMARA ORAL     midodrine 10 MG tablet  Commonly known as: PROAMATINE  1 tablet (10 mg total) by Per G Tube route 3 (three) times daily.     oxybutynin 5 MG Tab  Commonly known as: DITROPAN     polyethylene glycol 17 gram Pwpk  Commonly known as: GLYCOLAX     sodium chloride 0.9 % Pgbk        STOP taking these medications    DAPTOmycin 500 mg injection  Commonly known as: CUBICIN     meropenem 1 gram injection  Commonly known as: MERREM     scopolamine 1.3-1.5 mg (1 mg over 3 days)  Commonly known as: TRANSDERM-SCOP           Where to Get Your Medications      Information about where to get these medications is not yet available    Ask your nurse or doctor about these medications  · ciprofloxacin HCl 500 MG tablet          Instructions:  1. Take all medications as prescribed  2. Keep all follow-up appointments  3. Return to the hospital or call your primary care physicians for any new, worsening, or recurrent symptoms.      Time spent on discharge totalled 35 minutes       Tee Finn MD  6:41 PM  08/05/2022

## 2022-08-05 NOTE — CARE UPDATE
08/04/22 1924   Patient Assessment/Suction   Level of Consciousness (AVPU) responds to pain   Respiratory Effort Normal;Unlabored   Expansion/Accessory Muscles/Retractions no use of accessory muscles   All Lung Fields Breath Sounds Anterior:;coarse   MAYNOR Breath Sounds coarse   Rhythm/Pattern, Respiratory unlabored   Cough Frequency with stimulation   Cough Type assisted   Suction Method oral;tracheal   Suction Pressure (mmHg) -120 mmHg   $ Suction Charges Inline Suction Procedure Stat Charge   Secretions Amount small   Secretions Color yellow;white   Secretions Characteristics thin   PRE-TX-O2   O2 Device (Oxygen Therapy) ventilator   $ Is the patient on Low Flow Oxygen? Yes   Oxygen Concentration (%) 25   SpO2 99 %   Pulse Oximetry Type Continuous   $ Pulse Oximetry - Multiple Charge Pulse Oximetry - Multiple   Pulse 65   Resp 19   Aerosol Therapy   $ Aerosol Therapy Charges Aerosol Treatment   Daily Review of Necessity (SVN) completed   Respiratory Treatment Status (SVN) given   Treatment Route (SVN) in-line;ventilator   Patient Position (SVN) HOB elevated   Post Treatment Assessment (SVN) breath sounds unchanged   Signs of Intolerance (SVN) none   Breath Sounds Post-Respiratory Treatment   Post-treatment Heart Rate (beats/min) 64   Post-treatment Resp Rate (breaths/min) 18   Vent Select   Charged w/in last 24h YES   Preset Conventional Ventilator Settings   Ventilation Type VC   Vent Mode A/C   Set Rate 16 BPM   Vt Set 450 mL   PEEP/CPAP 5 cmH20   Peak Flow 60 L/min   Peak End Inspiratory Pressure 33 cmH20   I-Trigger Type  V-TRIG   Trigger Sensitivity Flow/I-Trigger 2 L/min   Patient Ventilator Parameters   Resp Rate Total 16 br/min   Peak Airway Pressure 42 cmH2O   Mean Airway Pressure 13 cmH20   Plateau Pressure 34 cmH20   Exhaled Vt 466 mL   Total Ve 7.44 mL   I:E Ratio Measured 1:3.60   Auto PEEP 0 cmH20   Conventional Ventilator Alarms   Resp Rate High Alarm 40 br/min   Press High Alarm 60 cmH2O   Apnea  Rate 16   Apnea Volume (mL) 0 mL   Apnea Oxygen Concentration  100   Apnea Flow Rate (L/min) 60   T Apnea 20 sec(s)   Ready to Wean/Extubation Screen   FIO2<=50 (chart decimal) 0.25   MV<16L (chart vol.) 7.44   PEEP <=8 (chart #) 5   Ready to Wean Parameters   F/VT Ratio<105 (RSBI) (!) 40.77   Vital Capacity (mL) 0   Education   $ Education Bronchodilator;Ventilator Oxygen;15 min   Respiratory Evaluation   $ Care Plan Tech Time 15 min

## 2022-08-05 NOTE — CARE UPDATE
08/04/22 1924   Patient Assessment/Suction   Level of Consciousness (AVPU) responds to pain   Respiratory Effort Normal;Unlabored   Expansion/Accessory Muscles/Retractions no use of accessory muscles   All Lung Fields Breath Sounds Anterior:;coarse   MAYNOR Breath Sounds coarse   Rhythm/Pattern, Respiratory unlabored   Cough Frequency with stimulation   Cough Type assisted   Suction Method oral;tracheal   Suction Pressure (mmHg) -120 mmHg   $ Suction Charges Inline Suction Procedure Stat Charge   Secretions Amount small   Secretions Color yellow;white   Secretions Characteristics thin   PRE-TX-O2   O2 Device (Oxygen Therapy) ventilator   $ Is the patient on Low Flow Oxygen? Yes   Oxygen Concentration (%) 25   SpO2 99 %   Pulse Oximetry Type Continuous   $ Pulse Oximetry - Multiple Charge Pulse Oximetry - Multiple   Pulse 65   Resp 19   Aerosol Therapy   $ Aerosol Therapy Charges Aerosol Treatment   Daily Review of Necessity (SVN) completed   Respiratory Treatment Status (SVN) given   Treatment Route (SVN) in-line;ventilator   Patient Position (SVN) HOB elevated   Post Treatment Assessment (SVN) breath sounds unchanged   Signs of Intolerance (SVN) none   Breath Sounds Post-Respiratory Treatment   Post-treatment Heart Rate (beats/min) 64   Post-treatment Resp Rate (breaths/min) 18   Vent Select   Conventional Vent Y   Charged w/in last 24h YES   Preset Conventional Ventilator Settings   Vent ID [id number]   Vent Type    Ventilation Type VC   Vent Mode A/C   Humidity HME   Set Rate 16 BPM   Vt Set 450 mL   PEEP/CPAP 5 cmH20   Peak Flow 60 L/min   Peak End Inspiratory Pressure 33 cmH20   I-Trigger Type  V-TRIG   Trigger Sensitivity Flow/I-Trigger 2 L/min   Patient Ventilator Parameters   Resp Rate Total 16 br/min   Peak Airway Pressure 42 cmH2O   Mean Airway Pressure 13 cmH20   Plateau Pressure 34 cmH20   Exhaled Vt 466 mL   Total Ve 7.44 mL   I:E Ratio Measured 1:3.60   Auto PEEP 0 cmH20   Tubing ID (mm) 8 mm   Tube Type  Trach   Conventional Ventilator Alarms   Alarms On Y   Resp Rate High Alarm 40 br/min   Press High Alarm 60 cmH2O   Apnea Rate 16   Apnea Volume (mL) 0 mL   Apnea Oxygen Concentration  100   Apnea Flow Rate (L/min) 60   T Apnea 20 sec(s)   IHI Ventilator Associated Pneumonia Bundle (Required)   Head of Bed Elevated  HOB 30   Oral Care Mouth swabbed;Mouth suctioned;Oral suctioning prior to turn   Ready to Wean/Extubation Screen   FIO2<=50 (chart decimal) 0.25   MV<16L (chart vol.) 7.44   PEEP <=8 (chart #) 5   Ready to Wean Parameters   F/VT Ratio<105 (RSBI) (!) 40.77   Vital Capacity (mL) 0   Education   $ Education Bronchodilator;Ventilator Oxygen;15 min   Respiratory Evaluation   $ Care Plan Tech Time 15 min

## 2022-08-05 NOTE — PLAN OF CARE
Problem: Feeding Intolerance (Enteral Nutrition)  Goal: Feeding Tolerance  Outcome: Ongoing, Progressing  Intervention: Prevent and Manage Feeding Intolerance  Flowsheets (Taken 8/5/2022 1327)  Nutrition Support Management: tube feeding continued as ordered     Patient tolerating tube feeding at goal rate of 50 ml/hr.     LBM: 8/1. Monitor for constipation, provide adequate hydration. If constipation suspected consider stool softener or laxative.

## 2022-08-05 NOTE — PROGRESS NOTES
Pulmonary/Critical Care  Progress Note      Patient name: Genna Felix  MRN: [id number]  Date: 08/05/2022    Admit Date: 7/29/2022  Consult Requested By: Tee Finn MD    Reason for Consult: Respiratory failure, chronic, aspiration    HPI:    7/29/2022 - 60 yo with h/o GERD, HTN, PE, trach, vent, anoxic brain injury sent to ER from [place] for coughing and possible aspiration.  In ER was hypothermic, procalcitonin was low, UA showed UTI.  CT chest reviewed and shows chronic changes, a nodule and some inflammatory changes.  Has QTc prolongation.  I ICU was hypotensive despite fluid bolus and pressors are being started.  I d./w her sister and updated her and attempted to call her daughter to discuss code status but no answer.    8/1/2022 - Events of weekend noted, no distress.  Remain unresponsive.  Has a lot of purulent secretions from trach.  Nursing reports that family has not visited.    8/2/2022 - Stable overnight, still with episodes of hypothermia.  No new respiratory issues.  O2 needs have decreased.  H/H has decreased.  CXR is better - ? If she had a component of pulmonary edema    8/3/2022 - Stable overnight but still with hypothermia on Bairhugger.  No new issues reported.  By report no family has visited.    8/4/2022 - Stable overnight but has remained hypothermic and is on Bairhugger.  She remains unresponsive with eyes open.  No new issues reported by nursing.  Cortisol level OK, stimulation test has been ordered    8/5/2022 - Stable overnight, temperature is up some.  Cortisol up some this AM (not adrenally insufficient)    Review of Systems    Review of Systems   Unable to perform ROS: Mental acuity       Past Medical History    Past Medical History:   Diagnosis Date    Anoxic brain damage 07/2020    Bedbound 07/2020    Cardiac arrest 07/2020    Cirrhosis     GERD (gastroesophageal reflux disease)     Hemangioma of intra-abdominal structure     Hypertension     Nursing home resident      Protein calorie malnutrition     Pulmonary embolus     Respiratory distress     S/P percutaneous endoscopic gastrostomy (PEG) tube placement 07/2020    Total self-care deficit 07/2020    Tracheostomy dependence     7/2020       Past Surgical History    Past Surgical History:   Procedure Laterality Date    PEG W/TRACHEOSTOMY PLACEMENT  07/27/2020    SKIN GRAFT      buttocks       Medications (scheduled):      acetylcysteine 200 mg/ml (20%)  4 mL Nebulization Q6H WAKE    albuterol-ipratropium  3 mL Nebulization Q6H WAKE    carboxymethylcellulose  1 drop Ophthalmic QID    ceftolozane-tazobactam  1,500 mg Intravenous Q8H    chlorhexidine  15 mL Mouth/Throat BID    ciprofloxacin  400 mg Intravenous Q12H    enoxparin  40 mg Subcutaneous Q24H    famotidine  20 mg Per G Tube BID    midodrine  10 mg Per G Tube TID    mupirocin   Nasal BID    oxybutynin  5 mg Oral BID       Medications (infusions):         Medications (prn):     acetaminophen, carboxymethylcellulose sodium, dextrose 10%, dextrose 10%, magnesium oxide, magnesium oxide, potassium bicarbonate, potassium bicarbonate, potassium bicarbonate, potassium, sodium phosphates, potassium, sodium phosphates, potassium, sodium phosphates    Family History:   Family History   Problem Relation Age of Onset    No Known Problems Mother     No Known Problems Father        Social History: Tobacco:   Social History     Tobacco Use   Smoking Status Never Smoker   Smokeless Tobacco Never Used                                EtOH:   Social History     Substance and Sexual Activity   Alcohol Use Not Currently                                Drugs:   Social History     Substance and Sexual Activity   Drug Use Not Currently         Physical Exam    Vital signs:  Temp:  [96.7 °F (35.9 °C)-100.2 °F (37.9 °C)]   Pulse:  []   Resp:  [16-31]   BP: ()/(50-71)   SpO2:  [98 %-100 %]     Intake/Output:     Intake/Output Summary (Last 24 hours) at 8/5/2022 1132  Last  data filed at 8/5/2022 0630  Gross per 24 hour   Intake 2220 ml   Output 2250 ml   Net -30 ml        BMI: Estimated body mass index is 32.98 kg/m² as calculated from the following:    Height as of this encounter: 5' (1.524 m).    Weight as of this encounter: 76.6 kg (168 lb 14 oz).    Physical Exam  Vitals and nursing note reviewed.   Constitutional:       General: She is not in acute distress.     Appearance: She is ill-appearing (chronically). She is not toxic-appearing or diaphoretic.      Comments: Eyes open, no response and does not fix on objects   HENT:      Head: Normocephalic and atraumatic.      Right Ear: External ear normal.      Left Ear: External ear normal.      Nose: Nose normal. No congestion or rhinorrhea.      Mouth/Throat:      Mouth: Mucous membranes are moist.      Pharynx: Oropharynx is clear. No oropharyngeal exudate or posterior oropharyngeal erythema.   Eyes:      General: No scleral icterus.        Right eye: No discharge.         Left eye: No discharge.      Extraocular Movements: Extraocular movements intact.      Conjunctiva/sclera: Conjunctivae normal.      Pupils: Pupils are equal, round, and reactive to light.      Comments: scleritis   Neck:      Vascular: No carotid bruit.      Comments: Trach   Cardiovascular:      Rate and Rhythm: Normal rate and regular rhythm.      Pulses: Normal pulses.      Heart sounds: No murmur heard.    No friction rub. No gallop.   Pulmonary:      Effort: Pulmonary effort is normal. No respiratory distress.      Breath sounds: No stridor. Rhonchi present. No wheezing or rales.      Comments: Ventilated   Chest:      Chest wall: No tenderness.   Abdominal:      General: There is no distension.      Tenderness: There is no abdominal tenderness. There is no guarding.      Comments: PEG  Biliary drain in place  Hypoactive BS   Genitourinary:     Comments: carroll  Musculoskeletal:         General: No swelling. Normal range of motion.      Cervical back: Normal  range of motion and neck supple. No rigidity or tenderness.      Right lower leg: No edema.      Left lower leg: No edema.      Comments: + contractures   Lymphadenopathy:      Cervical: No cervical adenopathy.   Skin:     General: Skin is warm and dry.      Coloration: Skin is not jaundiced.      Findings: No bruising.   Neurological:      Comments: Unresponsive  Chronic changes  No acute changes reported   Psychiatric:      Comments: Calm          Laboratory    Recent Labs   Lab 08/05/22  0146   WBC 7.70   RBC 3.51*   HGB 9.3*   HCT 30.4*      MCV 87   MCH 26.5*   MCHC 30.6*       Recent Labs   Lab 08/05/22  0146   CALCIUM 8.6*   PROT 7.7      K 4.1   CO2 30*      BUN 16   CREATININE <0.3*   ALKPHOS 176*   ALT 53*   AST 31   BILITOT 0.7       No results for input(s): PT, INR, APTT in the last 24 hours.    No results for input(s): CPK, CPKMB, TROPONINI, MB in the last 24 hours.    Additional labs: reviewed    Microbiology:       Microbiology Results (last 7 days)     Procedure Component Value Units Date/Time    Blood culture #2 **CANNOT BE ORDERED STAT** [[id number]] Collected: 07/29/22 0345    Order Status: Completed Specimen: Blood from Peripheral, Forearm, Right Updated: 08/03/22 0432     Blood Culture, Routine No growth after 5 days.    Blood culture #1 **CANNOT BE ORDERED STAT** [[id number]] Collected: 07/29/22 0320    Order Status: Completed Specimen: Blood from Peripheral, Hand, Right Updated: 08/03/22 0432     Blood Culture, Routine No growth after 5 days.    Culture, Respiratory with Gram Stain [[id number]]  (Abnormal)  (Susceptibility) Collected: 07/29/22 0306    Order Status: Completed Specimen: Respiratory from Tracheal Aspirate Updated: 08/01/22 1228     Respiratory Culture PSEUDOMONAS AERUGINOSA  Moderate        PROTEUS MIRABILIS ESBL  Moderate  Results called to and read back by RN M3 ANKIT HOLDSWORTH  07/31/2022    09:37 JT       Comment: Previous value was PPR verified by JJT at  08:37 on 07/30/2022  Previous comment was modified by JACIEL at 06:29 on 07/31/2022   Identification and susceptibility pending          Gram Stain (Respiratory) Many WBC's     Gram Stain (Respiratory) <10 epithelial cells per low power field.     Gram Stain (Respiratory) Few Gram negative rods    Urine culture [[id number]] Collected: 07/29/22 0545    Order Status: Completed Specimen: Urine Updated: 07/30/22 0751     Urine Culture, Routine Multiple organisms isolated. None in predominance.  Repeat if      clinically necessary.    Narrative:      Specimen Source->Urine          Radiology    X-Ray Chest 1 View  Portable chest x-ray 4:52 AM is compared to prior study dated 7/30/2022    Clinical history is pneumonia    The tracheostomy tube and right subclavian vein catheter are in stable position.  The cardiomediastinal silhouette is normal in size.    There is improvement of the interstitial opacities suggestive of resolving edema or pneumonia. There are no new infiltrates or pleural effusions.    IMPRESSION: Improvement of the interstitial opacities suggestive of resolving edema or pneumonia    Electronically signed by:  Jazmin Patton MD  8/2/2022 6:56 AM CDT Workstation: [id number]        Additional Studies    reviewed    Ventilator Information    Vent Mode: A/C  Oxygen Concentration (%):  [25] 25  Resp Rate Total:  [16 br/min-24 br/min] 17 br/min  Vt Set:  [450 mL] 450 mL  PEEP/CPAP:  [5 cmH20] 5 cmH20  Mean Airway Pressure:  [10 cmH20-15 cmH20] 10 cmH20         No results for input(s): PH, PCO2, PO2, HCO3, POCSATURATED, BE in the last 72 hours.      Impression    Active Hospital Problems    Diagnosis  POA    *Aspiration pneumonia [J69.0]  Yes    Ventilator dependence [Z99.11]  Not Applicable    Cirrhosis of liver [K74.60]  Yes    UTI (urinary tract infection) [N39.0]  Yes    Chronic respiratory failure with hypoxia [J96.11]  Yes    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]  Not Applicable    PEG (percutaneous  endoscopic gastrostomy) status [Z93.1]  Not Applicable    Anemia of chronic disease [D63.8]  Yes    Persistent vegetative state [R40.3]  Yes    Pressure injury of left buttock, 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  Added automatically from request for surgery [id number]        Resolved Hospital Problems    Diagnosis Date Resolved POA    Shock, unspecified [R57.9] 08/03/2022 Yes       Plan    · Continue vent support - vent dependent  · Antibiotics per ID  · Contact isolation  · Nebs and mucomyst to help with secretions  · I suspect hypothermia may be central in origin   · ? transfer back to [place] soon - I have no objection but they should be informed taht she may have issues with hypothermia chronically    Thank you for this consult.  I will follow with you while the patient is hospitalized.          Reji Andrews MD  [facility] Pulmonary/Critical Care

## 2022-08-08 NOTE — TELEPHONE ENCOUNTER
I called and left a message with Alyson Jernigan to call back tomorrow about a vitual appointment with Dr. Huynh.  Ariel

## 2022-08-08 NOTE — PLAN OF CARE
08/08/22 0923   Final Note   Assessment Type Final Discharge Note   Anticipated Discharge Disposition Int Care Fac   Patient returned to [facility] where she is a resident.  RN informed to call repor tto [phone].  Transportation set up with ADT30 order through transfer center related to patient being vent dependent.   No other needs identified at this time.

## 2022-08-10 ENCOUNTER — HOSPITAL ENCOUNTER (EMERGENCY)
Facility: HOSPITAL | Age: 62
End: 2022-08-10
Attending: EMERGENCY MEDICINE
Payer: MEDICARE

## 2022-08-10 VITALS
BODY MASS INDEX: 32.98 KG/M2 | DIASTOLIC BLOOD PRESSURE: 44 MMHG | TEMPERATURE: 97 F | SYSTOLIC BLOOD PRESSURE: 94 MMHG | OXYGEN SATURATION: 100 % | RESPIRATION RATE: 21 BRPM | HEART RATE: 46 BPM | WEIGHT: 168.88 LBS

## 2022-08-10 DIAGNOSIS — K94.23 PEG TUBE MALFUNCTION: ICD-10-CM

## 2022-08-10 DIAGNOSIS — T68.XXXA HYPOTHERMIA, INITIAL ENCOUNTER: Primary | ICD-10-CM

## 2022-08-10 LAB
ALBUMIN SERPL BCP-MCNC: 3 G/DL (ref 3.5–5.2)
ALP SERPL-CCNC: 148 U/L (ref 55–135)
ALT SERPL W/O P-5'-P-CCNC: 32 U/L (ref 10–44)
ANION GAP SERPL CALC-SCNC: 12 MMOL/L (ref 8–16)
AST SERPL-CCNC: 20 U/L (ref 10–40)
BACTERIA #/AREA URNS HPF: NEGATIVE /HPF
BASOPHILS # BLD AUTO: 0.01 K/UL (ref 0–0.2)
BASOPHILS NFR BLD: 0.2 % (ref 0–1.9)
BILIRUB SERPL-MCNC: 1.1 MG/DL (ref 0.1–1)
BILIRUB UR QL STRIP: ABNORMAL
BUN SERPL-MCNC: 18 MG/DL (ref 6–20)
CALCIUM SERPL-MCNC: 8.6 MG/DL (ref 8.7–10.5)
CHLORIDE SERPL-SCNC: 110 MMOL/L (ref 95–110)
CLARITY UR: CLEAR
CO2 SERPL-SCNC: 17 MMOL/L (ref 23–29)
COLOR UR: YELLOW
CREAT SERPL-MCNC: 0.3 MG/DL (ref 0.5–1.4)
DIFFERENTIAL METHOD: ABNORMAL
EOSINOPHIL # BLD AUTO: 0.2 K/UL (ref 0–0.5)
EOSINOPHIL NFR BLD: 4.6 % (ref 0–8)
ERYTHROCYTE [DISTWIDTH] IN BLOOD BY AUTOMATED COUNT: 18.3 % (ref 11.5–14.5)
EST. GFR  (NO RACE VARIABLE): >60 ML/MIN/1.73 M^2
GLUCOSE SERPL-MCNC: 79 MG/DL (ref 70–110)
GLUCOSE UR QL STRIP: NEGATIVE
HCT VFR BLD AUTO: 28.3 % (ref 37–48.5)
HGB BLD-MCNC: 8.7 G/DL (ref 12–16)
HGB UR QL STRIP: ABNORMAL
HYALINE CASTS #/AREA URNS LPF: 100 /LPF
IMM GRANULOCYTES # BLD AUTO: 0.01 K/UL (ref 0–0.04)
IMM GRANULOCYTES NFR BLD AUTO: 0.2 % (ref 0–0.5)
KETONES UR QL STRIP: ABNORMAL
LACTATE SERPL-SCNC: 0.7 MMOL/L (ref 0.5–1.9)
LEUKOCYTE ESTERASE UR QL STRIP: NEGATIVE
LIPASE SERPL-CCNC: 21 U/L (ref 4–60)
LYMPHOCYTES # BLD AUTO: 1.3 K/UL (ref 1–4.8)
LYMPHOCYTES NFR BLD: 26.9 % (ref 18–48)
MAGNESIUM SERPL-MCNC: 2.1 MG/DL (ref 1.6–2.6)
MCH RBC QN AUTO: 26.5 PG (ref 27–31)
MCHC RBC AUTO-ENTMCNC: 30.7 G/DL (ref 32–36)
MCV RBC AUTO: 86 FL (ref 82–98)
MICROSCOPIC COMMENT: ABNORMAL
MONOCYTES # BLD AUTO: 0.4 K/UL (ref 0.3–1)
MONOCYTES NFR BLD: 7.5 % (ref 4–15)
NEUTROPHILS # BLD AUTO: 2.9 K/UL (ref 1.8–7.7)
NEUTROPHILS NFR BLD: 60.6 % (ref 38–73)
NITRITE UR QL STRIP: NEGATIVE
NRBC BLD-RTO: 0 /100 WBC
PH UR STRIP: 7 [PH] (ref 5–8)
PLATELET # BLD AUTO: 300 K/UL (ref 150–450)
PMV BLD AUTO: 10.1 FL (ref 9.2–12.9)
POTASSIUM SERPL-SCNC: 2.9 MMOL/L (ref 3.5–5.1)
PROT SERPL-MCNC: 8.1 G/DL (ref 6–8.4)
PROT UR QL STRIP: ABNORMAL
RBC # BLD AUTO: 3.28 M/UL (ref 4–5.4)
RBC #/AREA URNS HPF: 21 /HPF (ref 0–4)
SARS-COV-2 RDRP RESP QL NAA+PROBE: NEGATIVE
SODIUM SERPL-SCNC: 139 MMOL/L (ref 136–145)
SP GR UR STRIP: >=1.03 (ref 1–1.03)
SQUAMOUS #/AREA URNS HPF: 23 /HPF
TROPONIN I SERPL DL<=0.01 NG/ML-MCNC: <0.03 NG/ML
URN SPEC COLLECT METH UR: ABNORMAL
UROBILINOGEN UR STRIP-ACNC: ABNORMAL EU/DL
WBC # BLD AUTO: 4.83 K/UL (ref 3.9–12.7)
WBC #/AREA URNS HPF: 75 /HPF (ref 0–5)

## 2022-08-10 PROCEDURE — 25000003 PHARM REV CODE 250: Performed by: EMERGENCY MEDICINE

## 2022-08-10 PROCEDURE — 99284 EMERGENCY DEPT VISIT MOD MDM: CPT | Mod: 25

## 2022-08-10 PROCEDURE — 99900031 HC PATIENT EDUCATION (STAT)

## 2022-08-10 PROCEDURE — 85025 COMPLETE CBC W/AUTO DIFF WBC: CPT | Performed by: EMERGENCY MEDICINE

## 2022-08-10 PROCEDURE — 93005 ELECTROCARDIOGRAM TRACING: CPT | Performed by: INTERNAL MEDICINE

## 2022-08-10 PROCEDURE — 87040 BLOOD CULTURE FOR BACTERIA: CPT | Mod: 59 | Performed by: EMERGENCY MEDICINE

## 2022-08-10 PROCEDURE — 99900035 HC TECH TIME PER 15 MIN (STAT)

## 2022-08-10 PROCEDURE — 80053 COMPREHEN METABOLIC PANEL: CPT | Performed by: EMERGENCY MEDICINE

## 2022-08-10 PROCEDURE — 94761 N-INVAS EAR/PLS OXIMETRY MLT: CPT

## 2022-08-10 PROCEDURE — 87077 CULTURE AEROBIC IDENTIFY: CPT | Performed by: EMERGENCY MEDICINE

## 2022-08-10 PROCEDURE — 84484 ASSAY OF TROPONIN QUANT: CPT | Performed by: EMERGENCY MEDICINE

## 2022-08-10 PROCEDURE — U0002 COVID-19 LAB TEST NON-CDC: HCPCS | Performed by: EMERGENCY MEDICINE

## 2022-08-10 PROCEDURE — 87186 SC STD MICRODIL/AGAR DIL: CPT | Performed by: EMERGENCY MEDICINE

## 2022-08-10 PROCEDURE — 83690 ASSAY OF LIPASE: CPT | Performed by: EMERGENCY MEDICINE

## 2022-08-10 PROCEDURE — 99900026 HC AIRWAY MAINTENANCE (STAT)

## 2022-08-10 PROCEDURE — 25500020 PHARM REV CODE 255: Performed by: EMERGENCY MEDICINE

## 2022-08-10 PROCEDURE — 83605 ASSAY OF LACTIC ACID: CPT | Performed by: EMERGENCY MEDICINE

## 2022-08-10 PROCEDURE — 87086 URINE CULTURE/COLONY COUNT: CPT | Performed by: EMERGENCY MEDICINE

## 2022-08-10 PROCEDURE — 93010 ELECTROCARDIOGRAM REPORT: CPT | Mod: ,,, | Performed by: INTERNAL MEDICINE

## 2022-08-10 PROCEDURE — 81001 URINALYSIS AUTO W/SCOPE: CPT | Performed by: EMERGENCY MEDICINE

## 2022-08-10 PROCEDURE — 83735 ASSAY OF MAGNESIUM: CPT | Performed by: EMERGENCY MEDICINE

## 2022-08-10 PROCEDURE — 93010 EKG 12-LEAD: ICD-10-PCS | Mod: ,,, | Performed by: INTERNAL MEDICINE

## 2022-08-10 PROCEDURE — 87070 CULTURE OTHR SPECIMN AEROBIC: CPT | Performed by: EMERGENCY MEDICINE

## 2022-08-10 PROCEDURE — 94002 VENT MGMT INPAT INIT DAY: CPT

## 2022-08-10 PROCEDURE — 36000 PLACE NEEDLE IN VEIN: CPT

## 2022-08-10 PROCEDURE — 87205 SMEAR GRAM STAIN: CPT | Performed by: EMERGENCY MEDICINE

## 2022-08-10 RX ORDER — SODIUM CHLORIDE 9 MG/ML
1000 INJECTION, SOLUTION INTRAVENOUS
Status: COMPLETED | OUTPATIENT
Start: 2022-08-10 | End: 2022-08-10

## 2022-08-10 RX ORDER — TOBRAMYCIN 40 MG/ML
INJECTION INTRAMUSCULAR; INTRAVENOUS
Status: ON HOLD | COMMUNITY
Start: 2022-08-08 | End: 2022-08-26 | Stop reason: HOSPADM

## 2022-08-10 RX ORDER — POTASSIUM CHLORIDE 20 MEQ/1
40 TABLET, EXTENDED RELEASE ORAL ONCE
Status: COMPLETED | OUTPATIENT
Start: 2022-08-10 | End: 2022-08-10

## 2022-08-10 RX ADMIN — SODIUM CHLORIDE 1000 ML: 0.9 INJECTION, SOLUTION INTRAVENOUS at 05:08

## 2022-08-10 RX ADMIN — POTASSIUM CHLORIDE 40 MEQ: 1500 TABLET, EXTENDED RELEASE ORAL at 06:08

## 2022-08-10 RX ADMIN — DIATRIZOATE MEGLUMINE AND DIATRIZOATE SODIUM 30 ML: 660; 100 LIQUID ORAL; RECTAL at 03:08

## 2022-08-10 NOTE — TELEPHONE ENCOUNTER
I called and spoke to Pavithra, who sent me to Aide to take a message for Delon.  Dr. Huynh suggests that she does a virtual appointment vs an office visit since she's on a ventilator.  Ariel

## 2022-08-10 NOTE — PLAN OF CARE
08/10/22 1431   Patient Assessment/Suction   Level of Consciousness (AVPU) alert   Respiratory Effort Unlabored   Expansion/Accessory Muscles/Retractions no use of accessory muscles   All Lung Fields Breath Sounds clear;equal bilaterally   Rhythm/Pattern, Respiratory unlabored;pattern regular;depth regular   Cough Frequency infrequent   Cough Type assisted;nonproductive   PRE-TX-O2   O2 Device (Oxygen Therapy) nasal cannula   Oxygen Concentration (%) 40   SpO2 100 %   Pulse Oximetry Type Continuous   $ Pulse Oximetry - Multiple Charge Pulse Oximetry - Multiple   Pulse 63   Resp (!) 22   BP (!) 89/59   General Safety Checklist   Safety Promotion/Fall Prevention side rails raised   Airway Safety   Ambu bag with the patient? Yes, Adult Ambu   Is mask with the patient? Yes, Adult Mask   Extra felipa tubes are at the bedside? Yes   ETT at Bedside? Yes   ETT Size 8   Is obturator available? Yes   Location of obturator?  Head of bed   Vent Select   Conventional Vent Y   Ventilator Initiated Yes   $ Ventilator Initial 1   Charged w/in last 24h YES   Preset Conventional Ventilator Settings   Vent ID [id number]   Vent Type    Ventilation Type VC   Vent Mode A/C   Humidity HME   Set Rate 22 BPM   Vt Set 400 mL   PEEP/CPAP 5 cmH20   Pressure Support 0 cmH20   Waveform RAMP   Peak Flow 60 L/min   Plateau Set/Insp. Hold (sec) 0   Trigger Sensitivity Flow/I-Trigger 3 L/min   Patient Ventilator Parameters   Resp Rate Total 22 br/min   Peak Airway Pressure 29 cmH2O   Mean Airway Pressure 11 cmH20   Plateau Pressure 0 cmH20   Exhaled Vt 422 mL   Total Ve 9.32 mL   I:E Ratio Measured 1:2.80   Tubing ID (mm) 8 mm   Conventional Ventilator Alarms   Alarms On Y   Resp Rate High Alarm 45 br/min   Press High Alarm 40 cmH2O   Apnea Rate 20   Apnea Volume (mL) 0 mL   Apnea Oxygen Concentration  100   Apnea Flow Rate (L/min) 60   T Apnea 20 sec(s)   IHI Ventilator Associated Pneumonia Bundle (Required)   Head of Bed Elevated  HOB 30   Oral Care  Mouth swabbed;Mouth moisturizer;Mouth suctioned   Ready to Wean/Extubation Screen   FIO2<=50 (chart decimal) 0.4   MV<16L (chart vol.) 9.32   PEEP <=8 (chart #) 5   Ready to Wean Parameters   F/VT Ratio<105 (RSBI) (!) 52.13

## 2022-08-10 NOTE — ED PROVIDER NOTES
Encounter Date: 8/10/2022       History     Chief Complaint   Patient presents with    peg tube problem     Patient presents complaining of needing PEG tube replaced.  Patient has persistent vegetative state and is sent over from [place] secondary to needing a larger PEG tube.  [place] otherwise has no other complaints.          Review of patient's allergies indicates:  No Known Allergies  Past Medical History:   Diagnosis Date    Anoxic brain damage 07/2020    Bedbound 07/2020    Cardiac arrest 07/2020    Cirrhosis     GERD (gastroesophageal reflux disease)     Hemangioma of intra-abdominal structure     Hypertension     Nursing home resident     Protein calorie malnutrition     Pulmonary embolus     Respiratory distress     S/P percutaneous endoscopic gastrostomy (PEG) tube placement 07/2020    Total self-care deficit 07/2020    Tracheostomy dependence     7/2020     Past Surgical History:   Procedure Laterality Date    PEG W/TRACHEOSTOMY PLACEMENT  07/27/2020    SKIN GRAFT      buttocks     Family History   Problem Relation Age of Onset    No Known Problems Mother     No Known Problems Father      Social History     Tobacco Use    Smoking status: Never Smoker    Smokeless tobacco: Never Used   Substance Use Topics    Alcohol use: Not Currently    Drug use: Not Currently     Review of Systems   All other systems reviewed and are negative.      Physical Exam     Initial Vitals   BP Pulse Resp Temp SpO2   08/10/22 1431 08/10/22 1431 08/10/22 1431 08/10/22 1530 08/10/22 1431   (!) 89/59 63 (!) 22 (!) 95.5 °F (35.3 °C) 100 %      MAP       --                Physical Exam    Nursing note and vitals reviewed.  Constitutional:   Patient is in a persisted vegetative state.  She does appear to startle to loud noise and does move her head left and right.   HENT:   Head: Normocephalic and atraumatic.   Trach appears clean dry and intact   Eyes: EOM are normal.   Neck: No tracheal deviation present.    Cardiovascular: Intact distal pulses.   Pulmonary/Chest: No respiratory distress.   Abdominal: Abdomen is soft.   PEG tube appears clean, dry, intact     Neurological:   Eyes are open.  Patient does not obey commands.   Skin: Skin is warm and dry. Capillary refill takes less than 2 seconds.         ED Course   Procedures  Labs Reviewed   CBC W/ AUTO DIFFERENTIAL - Abnormal; Notable for the following components:       Result Value    RBC 3.28 (*)     Hemoglobin 8.7 (*)     Hematocrit 28.3 (*)     MCH 26.5 (*)     MCHC 30.7 (*)     RDW 18.3 (*)     All other components within normal limits   COMPREHENSIVE METABOLIC PANEL - Abnormal; Notable for the following components:    Potassium 2.9 (*)     CO2 17 (*)     Creatinine 0.3 (*)     Calcium 8.6 (*)     Albumin 3.0 (*)     Total Bilirubin 1.1 (*)     Alkaline Phosphatase 148 (*)     All other components within normal limits    Narrative:     Potassium critical result(s) repeated. Called and verbal readback   obtained from [facility], RN by LEAH 08/10/2022 18:03   CULTURE, BLOOD   CULTURE, BLOOD   CULTURE, RESPIRATORY   LACTIC ACID, PLASMA   MAGNESIUM   LIPASE   TROPONIN I   SARS-COV-2 RNA AMPLIFICATION, QUAL   URINALYSIS, REFLEX TO URINE CULTURE   LACTIC ACID, PLASMA          Imaging Results          X-Ray Chest AP Portable (Final result)  Result time 08/10/22 16:11:44    Final result by Nigel Rolon MD (08/10/22 16:11:44)                 Narrative:    HISTORY: Sepsis    FINDINGS: Portable chest radiograph at 1544 hours compared to 08/02/2022 shows tracheostomy cannula unchanged in position, with interval removal of right subclavian central venous catheter. The cardiomediastinal silhouette and pulmonary vasculature stable and within normal limits.    The lungs are normally expanded, with new patchy right lung airspace opacities suggesting atelectasis or infiltrate. No dense consolidation, large pleural effusion, or pneumothorax. No new osseous  abnormality.    IMPRESSION: New patchy right lung airspace opacities, suggesting atelectasis or infiltrates.    Electronically signed by:  Nigel Rolon MD  8/10/2022 4:11 PM CDT Workstation: [id number]                             X-Ray Abdomen AP 1 View (KUB) (Final result)  Result time 08/10/22 15:47:40    Final result by Jayant Rosen MD (08/10/22 15:47:40)                 Narrative:    XR ABDOMEN 1 VIEW (KUB)    CLINICAL HISTORY:  61 years Female PEG tube malfunction; Peg Tube Check. 30cc gastrovew injected into new peg tube with 10cc saline flush    COMPARISON: None    FINDINGS: Initial abdominal radiograph demonstrates nonobstructive bowel gas pattern. PEG tube projects over the left upper quadrant. After injection of Gastrografin contrast material, there is opacification of the gastric lumen and duodenum. No contrast extravasation is evident. Small volume gastroesophageal reflux into the distal esophagus. There is a percutaneous drain projecting over the right mid abdomen.    IMPRESSION:    PEG tube is in appropriate position.    Electronically signed by:  Jayant Rosen MD  8/10/2022 3:47 PM CDT Workstation: [id number]                               Medications   lactated ringers bolus 30 mL/kg (has no administration in time range)   diatrizoate meglumineand-diatrizoate sodium (GASTROVIEW) solution 30 mL (30 mLs Per G Tube Given 8/10/22 1527)   0.9%  NaCl infusion (1,000 mLs Intravenous New Bag 8/10/22 1751)   potassium chloride SA CR tablet 40 mEq (40 mEq Oral Given 8/10/22 1816)     Medical Decision Making:   Independently Interpreted Test(s):   I have ordered and independently interpreted EKG Reading(s) - see summary below       <> Summary of EKG Reading(s): EKG is regular rate and rhythm without ST elevation  Clinical Tests:   Lab Tests: Reviewed and Ordered  Radiological Study: Ordered and Reviewed  Medical Tests: Reviewed and Ordered  ED Management:  After replacing PEG tube patient had low blood  pressure and temperature was low at 95. Patient was put under a warming blanket and blood pressure quickly improved.  Temperature also improved.  Screening for sepsis showed normal lactate and normal white blood cell count.  Chest x-ray shows no acute infiltrate likely there is some atelectasis in the right lung base.  Discussed the chest x-ray with Dr. Baron he was able to review the x-ray and he agree with this assessment.  Potassium was replaced.  Patient appears to be stable after being warmed with the warming blanket.  Patient be discharged stable condition.  Detailed return precautions discussed.            Attending Attestation:             Attending ED Notes:   Procedure note:    The PEG tube was removed at the bedside after deflating 10 cc of normal saline without incident.  A new PEG tube was replaced 24 Polish and 10 cc of normal saline inflated the balloon.  The KUB with Gastrografin shows tube to be in correct position.  Patient tolerated procedure well without complication.               Clinical Impression:   Final diagnoses:  [K94.23] PEG tube malfunction  [T68.XXXA] Hypothermia, initial encounter (Primary)          ED Disposition Condition    Discharge Stable        ED Prescriptions     None        Follow-up Information    None          Stu Jade MD  08/10/22 8906

## 2022-08-10 NOTE — ED TRIAGE NOTES
Long term facility reports peg tube displacement Sunday with subsequent replacement.  [facility] requesting larger tube placment.

## 2022-08-11 NOTE — CARE UPDATE
08/10/22 1955   Patient Assessment/Suction   Level of Consciousness (AVPU) unresponsive   Respiratory Effort Normal   Expansion/Accessory Muscles/Retractions no use of accessory muscles   Rhythm/Pattern, Respiratory assisted mechanically   PRE-TX-O2   O2 Device (Oxygen Therapy) ventilator   Oxygen Concentration (%) 40   SpO2 100 %   Pulse Oximetry Type Continuous   Pulse (!) 52   Resp (!) 22   BP (!) 99/57   Vent Select   Conventional Vent Y   Charged w/in last 24h YES   Preset Conventional Ventilator Settings   Vent Type    Ventilation Type VC   Vent Mode A/C   Humidity HME   Set Rate 22 BPM   Vt Set 400 mL   PEEP/CPAP 5 cmH20   Pressure Support 0 cmH20   Waveform RAMP   Peak Flow 60 L/min   Plateau Set/Insp. Hold (sec) 0   Trigger Sensitivity Flow/I-Trigger 3 L/min   Patient Ventilator Parameters   Resp Rate Total 22 br/min   Peak Airway Pressure 50 cmH2O   Mean Airway Pressure 14 cmH20   Plateau Pressure 0 cmH20   Exhaled Vt 389 mL   Total Ve 8.69 mL   I:E Ratio Measured 1:2.80   Conventional Ventilator Alarms   Alarms On Y   Resp Rate High Alarm 45 br/min   Press High Alarm 50 cmH2O   Apnea Rate 20   Apnea Volume (mL) 0 mL   Apnea Oxygen Concentration  100   Apnea Flow Rate (L/min) 60   T Apnea 20 sec(s)   Ready to Wean/Extubation Screen   FIO2<=50 (chart decimal) 0.4   MV<16L (chart vol.) 8.69   PEEP <=8 (chart #) 5   Ready to Wean Parameters   F/VT Ratio<105 (RSBI) (!) 56.56   Education   $ Education Ventilator Oxygen;15 min   Respiratory Evaluation   $ Care Plan Tech Time 15 min

## 2022-08-15 LAB
BACTERIA SPEC AEROBE CULT: ABNORMAL
BACTERIA SPEC AEROBE CULT: ABNORMAL
BACTERIA UR CULT: ABNORMAL
GRAM STN SPEC: ABNORMAL

## 2022-08-16 LAB
BACTERIA BLD CULT: NORMAL
BACTERIA BLD CULT: NORMAL

## 2022-08-22 ENCOUNTER — HOSPITAL ENCOUNTER (INPATIENT)
Facility: HOSPITAL | Age: 62
LOS: 4 days | Discharge: NURSING, REHAB OR LONG-TERM CARE | DRG: 872 | End: 2022-08-26
Attending: STUDENT IN AN ORGANIZED HEALTH CARE EDUCATION/TRAINING PROGRAM | Admitting: INTERNAL MEDICINE
Payer: MEDICARE

## 2022-08-22 DIAGNOSIS — T68.XXXA HYPOTHERMIA, INITIAL ENCOUNTER: ICD-10-CM

## 2022-08-22 DIAGNOSIS — M79.89 ARM SWELLING: ICD-10-CM

## 2022-08-22 DIAGNOSIS — R06.02 SHORTNESS OF BREATH: ICD-10-CM

## 2022-08-22 DIAGNOSIS — E87.5 HYPERKALEMIA: Primary | ICD-10-CM

## 2022-08-22 PROBLEM — Z43.4 CHOLECYSTOSTOMY CARE: Status: ACTIVE | Noted: 2022-08-22

## 2022-08-22 PROBLEM — T07.XXXA WOUNDS, MULTIPLE: Status: ACTIVE | Noted: 2022-08-22

## 2022-08-22 LAB
ABO + RH BLD: NORMAL
ALBUMIN SERPL BCP-MCNC: 3.2 G/DL (ref 3.5–5.2)
ALLENS TEST: ABNORMAL
ALP SERPL-CCNC: 201 U/L (ref 55–135)
ALT SERPL W/O P-5'-P-CCNC: 42 U/L (ref 10–44)
ANION GAP SERPL CALC-SCNC: 6 MMOL/L (ref 8–16)
ANION GAP SERPL CALC-SCNC: 7 MMOL/L (ref 8–16)
AST SERPL-CCNC: 44 U/L (ref 10–40)
BACTERIA #/AREA URNS AUTO: ABNORMAL /HPF
BASOPHILS # BLD AUTO: 0.04 K/UL (ref 0–0.2)
BASOPHILS NFR BLD: 0.6 % (ref 0–1.9)
BILIRUB SERPL-MCNC: 1.1 MG/DL (ref 0.1–1)
BILIRUB UR QL STRIP: NEGATIVE
BLD GP AB SCN CELLS X3 SERPL QL: NORMAL
BUN SERPL-MCNC: 36 MG/DL (ref 6–20)
BUN SERPL-MCNC: 38 MG/DL (ref 6–20)
BUN SERPL-MCNC: 38 MG/DL (ref 6–30)
CALCIUM SERPL-MCNC: 10.1 MG/DL (ref 8.7–10.5)
CALCIUM SERPL-MCNC: 10.1 MG/DL (ref 8.7–10.5)
CHLORIDE SERPL-SCNC: 104 MMOL/L (ref 95–110)
CHLORIDE SERPL-SCNC: 105 MMOL/L (ref 95–110)
CHLORIDE SERPL-SCNC: 107 MMOL/L (ref 95–110)
CLARITY UR REFRACT.AUTO: ABNORMAL
CO2 SERPL-SCNC: 19 MMOL/L (ref 23–29)
CO2 SERPL-SCNC: 21 MMOL/L (ref 23–29)
COLOR UR AUTO: YELLOW
CREAT SERPL-MCNC: 0.4 MG/DL (ref 0.5–1.4)
CREAT SERPL-MCNC: 0.5 MG/DL (ref 0.5–1.4)
CREAT SERPL-MCNC: 0.6 MG/DL (ref 0.5–1.4)
DELSYS: ABNORMAL
DIFFERENTIAL METHOD: ABNORMAL
EOSINOPHIL # BLD AUTO: 0.4 K/UL (ref 0–0.5)
EOSINOPHIL NFR BLD: 5.2 % (ref 0–8)
ERYTHROCYTE [DISTWIDTH] IN BLOOD BY AUTOMATED COUNT: 18.6 % (ref 11.5–14.5)
ERYTHROCYTE [SEDIMENTATION RATE] IN BLOOD BY WESTERGREN METHOD: 18 MM/H
EST. GFR  (NO RACE VARIABLE): >60 ML/MIN/1.73 M^2
EST. GFR  (NO RACE VARIABLE): >60 ML/MIN/1.73 M^2
FIO2: 40
GLUCOSE SERPL-MCNC: 109 MG/DL (ref 70–110)
GLUCOSE SERPL-MCNC: 85 MG/DL (ref 70–110)
GLUCOSE SERPL-MCNC: 92 MG/DL (ref 70–110)
GLUCOSE UR QL STRIP: NEGATIVE
HCO3 UR-SCNC: 22.7 MMOL/L (ref 24–28)
HCT VFR BLD AUTO: 34.6 % (ref 37–48.5)
HCT VFR BLD CALC: 34 %PCV (ref 36–54)
HGB BLD-MCNC: 10.8 G/DL (ref 12–16)
HGB UR QL STRIP: ABNORMAL
HYALINE CASTS UR QL AUTO: 0 /LPF
IMM GRANULOCYTES # BLD AUTO: 0.02 K/UL (ref 0–0.04)
IMM GRANULOCYTES NFR BLD AUTO: 0.3 % (ref 0–0.5)
INR PPP: 1.2 (ref 0.8–1.2)
KETONES UR QL STRIP: NEGATIVE
LACTATE SERPL-SCNC: 0.6 MMOL/L (ref 0.5–2.2)
LEUKOCYTE ESTERASE UR QL STRIP: ABNORMAL
LIPASE SERPL-CCNC: 14 U/L (ref 4–60)
LYMPHOCYTES # BLD AUTO: 1.3 K/UL (ref 1–4.8)
LYMPHOCYTES NFR BLD: 17.9 % (ref 18–48)
MAGNESIUM SERPL-MCNC: 2.1 MG/DL (ref 1.6–2.6)
MCH RBC QN AUTO: 26.3 PG (ref 27–31)
MCHC RBC AUTO-ENTMCNC: 31.2 G/DL (ref 32–36)
MCV RBC AUTO: 84 FL (ref 82–98)
MICROSCOPIC COMMENT: ABNORMAL
MODE: ABNORMAL
MONOCYTES # BLD AUTO: 0.6 K/UL (ref 0.3–1)
MONOCYTES NFR BLD: 9.1 % (ref 4–15)
NEUTROPHILS # BLD AUTO: 4.7 K/UL (ref 1.8–7.7)
NEUTROPHILS NFR BLD: 66.9 % (ref 38–73)
NITRITE UR QL STRIP: NEGATIVE
NRBC BLD-RTO: 0 /100 WBC
PCO2 BLDA: 37.6 MMHG (ref 35–45)
PEEP: 5
PH SMN: 7.39 [PH] (ref 7.35–7.45)
PH UR STRIP: 7 [PH] (ref 5–8)
PLATELET # BLD AUTO: 368 K/UL (ref 150–450)
PMV BLD AUTO: 10.8 FL (ref 9.2–12.9)
PO2 BLDA: 101 MMHG (ref 40–60)
POC BE: -2 MMOL/L
POC IONIZED CALCIUM: 1.25 MMOL/L (ref 1.06–1.42)
POC SATURATED O2: 98 % (ref 95–100)
POC TCO2 (MEASURED): 23 MMOL/L (ref 23–29)
POC TCO2: 24 MMOL/L (ref 24–29)
POCT GLUCOSE: 166 MG/DL (ref 70–110)
POCT GLUCOSE: 99 MG/DL (ref 70–110)
POTASSIUM BLD-SCNC: 6.2 MMOL/L (ref 3.5–5.1)
POTASSIUM SERPL-SCNC: 4.9 MMOL/L (ref 3.5–5.1)
POTASSIUM SERPL-SCNC: 6.1 MMOL/L (ref 3.5–5.1)
PROT SERPL-MCNC: 9.8 G/DL (ref 6–8.4)
PROT UR QL STRIP: ABNORMAL
PROTHROMBIN TIME: 12.1 SEC (ref 9–12.5)
RBC # BLD AUTO: 4.1 M/UL (ref 4–5.4)
RBC #/AREA URNS AUTO: 86 /HPF (ref 0–4)
SAMPLE: ABNORMAL
SAMPLE: ABNORMAL
SITE: ABNORMAL
SODIUM BLD-SCNC: 137 MMOL/L (ref 136–145)
SODIUM SERPL-SCNC: 130 MMOL/L (ref 136–145)
SODIUM SERPL-SCNC: 132 MMOL/L (ref 136–145)
SP GR UR STRIP: 1.02 (ref 1–1.03)
SQUAMOUS #/AREA URNS AUTO: 0 /HPF
TROPONIN I SERPL DL<=0.01 NG/ML-MCNC: <0.006 NG/ML (ref 0–0.03)
TSH SERPL DL<=0.005 MIU/L-ACNC: 1.29 UIU/ML (ref 0.4–4)
URN SPEC COLLECT METH UR: ABNORMAL
VT: 400
WBC # BLD AUTO: 7.05 K/UL (ref 3.9–12.7)
WBC #/AREA URNS AUTO: 44 /HPF (ref 0–5)
WBC CLUMPS UR QL AUTO: ABNORMAL
YEAST UR QL AUTO: ABNORMAL

## 2022-08-22 PROCEDURE — 96365 THER/PROPH/DIAG IV INF INIT: CPT

## 2022-08-22 PROCEDURE — 99291 CRITICAL CARE FIRST HOUR: CPT | Mod: CS,,, | Performed by: STUDENT IN AN ORGANIZED HEALTH CARE EDUCATION/TRAINING PROGRAM

## 2022-08-22 PROCEDURE — 83690 ASSAY OF LIPASE: CPT | Performed by: STUDENT IN AN ORGANIZED HEALTH CARE EDUCATION/TRAINING PROGRAM

## 2022-08-22 PROCEDURE — 82962 GLUCOSE BLOOD TEST: CPT

## 2022-08-22 PROCEDURE — 96375 TX/PRO/DX INJ NEW DRUG ADDON: CPT

## 2022-08-22 PROCEDURE — 86901 BLOOD TYPING SEROLOGIC RH(D): CPT | Performed by: STUDENT IN AN ORGANIZED HEALTH CARE EDUCATION/TRAINING PROGRAM

## 2022-08-22 PROCEDURE — 86803 HEPATITIS C AB TEST: CPT | Performed by: EMERGENCY MEDICINE

## 2022-08-22 PROCEDURE — 87186 SC STD MICRODIL/AGAR DIL: CPT | Performed by: STUDENT IN AN ORGANIZED HEALTH CARE EDUCATION/TRAINING PROGRAM

## 2022-08-22 PROCEDURE — 12000002 HC ACUTE/MED SURGE SEMI-PRIVATE ROOM

## 2022-08-22 PROCEDURE — 84484 ASSAY OF TROPONIN QUANT: CPT | Performed by: STUDENT IN AN ORGANIZED HEALTH CARE EDUCATION/TRAINING PROGRAM

## 2022-08-22 PROCEDURE — 80053 COMPREHEN METABOLIC PANEL: CPT | Performed by: STUDENT IN AN ORGANIZED HEALTH CARE EDUCATION/TRAINING PROGRAM

## 2022-08-22 PROCEDURE — 99900035 HC TECH TIME PER 15 MIN (STAT)

## 2022-08-22 PROCEDURE — 93010 EKG 12-LEAD: ICD-10-PCS | Mod: ,,, | Performed by: INTERNAL MEDICINE

## 2022-08-22 PROCEDURE — 83605 ASSAY OF LACTIC ACID: CPT | Performed by: STUDENT IN AN ORGANIZED HEALTH CARE EDUCATION/TRAINING PROGRAM

## 2022-08-22 PROCEDURE — 87077 CULTURE AEROBIC IDENTIFY: CPT | Performed by: STUDENT IN AN ORGANIZED HEALTH CARE EDUCATION/TRAINING PROGRAM

## 2022-08-22 PROCEDURE — 80047 BASIC METABLC PNL IONIZED CA: CPT

## 2022-08-22 PROCEDURE — 82803 BLOOD GASES ANY COMBINATION: CPT

## 2022-08-22 PROCEDURE — 96367 TX/PROPH/DG ADDL SEQ IV INF: CPT

## 2022-08-22 PROCEDURE — 87040 BLOOD CULTURE FOR BACTERIA: CPT | Performed by: STUDENT IN AN ORGANIZED HEALTH CARE EDUCATION/TRAINING PROGRAM

## 2022-08-22 PROCEDURE — 87086 URINE CULTURE/COLONY COUNT: CPT | Performed by: STUDENT IN AN ORGANIZED HEALTH CARE EDUCATION/TRAINING PROGRAM

## 2022-08-22 PROCEDURE — 87106 FUNGI IDENTIFICATION YEAST: CPT | Performed by: STUDENT IN AN ORGANIZED HEALTH CARE EDUCATION/TRAINING PROGRAM

## 2022-08-22 PROCEDURE — 94761 N-INVAS EAR/PLS OXIMETRY MLT: CPT

## 2022-08-22 PROCEDURE — U0002 COVID-19 LAB TEST NON-CDC: HCPCS | Performed by: STUDENT IN AN ORGANIZED HEALTH CARE EDUCATION/TRAINING PROGRAM

## 2022-08-22 PROCEDURE — 93005 ELECTROCARDIOGRAM TRACING: CPT

## 2022-08-22 PROCEDURE — 87389 HIV-1 AG W/HIV-1&-2 AB AG IA: CPT | Performed by: EMERGENCY MEDICINE

## 2022-08-22 PROCEDURE — 25000003 PHARM REV CODE 250: Performed by: STUDENT IN AN ORGANIZED HEALTH CARE EDUCATION/TRAINING PROGRAM

## 2022-08-22 PROCEDURE — 93010 ELECTROCARDIOGRAM REPORT: CPT | Mod: ,,, | Performed by: INTERNAL MEDICINE

## 2022-08-22 PROCEDURE — 63600175 PHARM REV CODE 636 W HCPCS: Performed by: STUDENT IN AN ORGANIZED HEALTH CARE EDUCATION/TRAINING PROGRAM

## 2022-08-22 PROCEDURE — 27000221 HC OXYGEN, UP TO 24 HOURS

## 2022-08-22 PROCEDURE — 80048 BASIC METABOLIC PNL TOTAL CA: CPT | Mod: XB | Performed by: STUDENT IN AN ORGANIZED HEALTH CARE EDUCATION/TRAINING PROGRAM

## 2022-08-22 PROCEDURE — 81001 URINALYSIS AUTO W/SCOPE: CPT | Performed by: STUDENT IN AN ORGANIZED HEALTH CARE EDUCATION/TRAINING PROGRAM

## 2022-08-22 PROCEDURE — 99285 EMERGENCY DEPT VISIT HI MDM: CPT | Mod: 25

## 2022-08-22 PROCEDURE — 85610 PROTHROMBIN TIME: CPT | Performed by: STUDENT IN AN ORGANIZED HEALTH CARE EDUCATION/TRAINING PROGRAM

## 2022-08-22 PROCEDURE — 85025 COMPLETE CBC W/AUTO DIFF WBC: CPT | Performed by: STUDENT IN AN ORGANIZED HEALTH CARE EDUCATION/TRAINING PROGRAM

## 2022-08-22 PROCEDURE — 83735 ASSAY OF MAGNESIUM: CPT | Performed by: STUDENT IN AN ORGANIZED HEALTH CARE EDUCATION/TRAINING PROGRAM

## 2022-08-22 PROCEDURE — 87088 URINE BACTERIA CULTURE: CPT | Performed by: STUDENT IN AN ORGANIZED HEALTH CARE EDUCATION/TRAINING PROGRAM

## 2022-08-22 PROCEDURE — 94002 VENT MGMT INPAT INIT DAY: CPT

## 2022-08-22 PROCEDURE — 99900026 HC AIRWAY MAINTENANCE (STAT)

## 2022-08-22 PROCEDURE — 84443 ASSAY THYROID STIM HORMONE: CPT | Performed by: STUDENT IN AN ORGANIZED HEALTH CARE EDUCATION/TRAINING PROGRAM

## 2022-08-22 PROCEDURE — 99291 PR CRITICAL CARE, E/M 30-74 MINUTES: ICD-10-PCS | Mod: CS,,, | Performed by: STUDENT IN AN ORGANIZED HEALTH CARE EDUCATION/TRAINING PROGRAM

## 2022-08-22 RX ORDER — SODIUM CHLORIDE 0.9 % (FLUSH) 0.9 %
10 SYRINGE (ML) INJECTION
Status: DISCONTINUED | OUTPATIENT
Start: 2022-08-22 | End: 2022-08-26 | Stop reason: HOSPADM

## 2022-08-22 RX ORDER — ENOXAPARIN SODIUM 100 MG/ML
40 INJECTION SUBCUTANEOUS EVERY 24 HOURS
Status: DISCONTINUED | OUTPATIENT
Start: 2022-08-23 | End: 2022-08-26 | Stop reason: HOSPADM

## 2022-08-22 RX ORDER — MEROPENEM AND SODIUM CHLORIDE 1 G/50ML
1 INJECTION, SOLUTION INTRAVENOUS
Status: DISCONTINUED | OUTPATIENT
Start: 2022-08-23 | End: 2022-08-23

## 2022-08-22 RX ORDER — SENNOSIDES 8.8 MG/5ML
5 LIQUID ORAL NIGHTLY
Status: DISCONTINUED | OUTPATIENT
Start: 2022-08-23 | End: 2022-08-26 | Stop reason: HOSPADM

## 2022-08-22 RX ORDER — POLYETHYLENE GLYCOL 3350 17 G/17G
17 POWDER, FOR SOLUTION ORAL DAILY
Status: DISCONTINUED | OUTPATIENT
Start: 2022-08-23 | End: 2022-08-26 | Stop reason: HOSPADM

## 2022-08-22 RX ORDER — ACETAMINOPHEN 325 MG/1
650 TABLET ORAL EVERY 4 HOURS PRN
Status: DISCONTINUED | OUTPATIENT
Start: 2022-08-23 | End: 2022-08-26 | Stop reason: HOSPADM

## 2022-08-22 RX ADMIN — PIPERACILLIN SODIUM AND TAZOBACTAM SODIUM 4.5 G: 4; .5 INJECTION, POWDER, LYOPHILIZED, FOR SOLUTION INTRAVENOUS at 09:08

## 2022-08-22 RX ADMIN — DEXTROSE 250 ML: 10 SOLUTION INTRAVENOUS at 09:08

## 2022-08-22 RX ADMIN — INSULIN HUMAN 10 UNITS: 100 INJECTION, SOLUTION PARENTERAL at 09:08

## 2022-08-22 RX ADMIN — VANCOMYCIN HYDROCHLORIDE 1250 MG: 1.25 INJECTION, POWDER, LYOPHILIZED, FOR SOLUTION INTRAVENOUS at 09:08

## 2022-08-23 PROBLEM — Z51.5 PALLIATIVE CARE ENCOUNTER: Status: ACTIVE | Noted: 2022-08-23

## 2022-08-23 LAB
ALBUMIN SERPL BCP-MCNC: 3.2 G/DL (ref 3.5–5.2)
ALLENS TEST: ABNORMAL
ALP SERPL-CCNC: 193 U/L (ref 55–135)
ALT SERPL W/O P-5'-P-CCNC: 48 U/L (ref 10–44)
ANION GAP SERPL CALC-SCNC: 6 MMOL/L (ref 8–16)
ANION GAP SERPL CALC-SCNC: 9 MMOL/L (ref 8–16)
AST SERPL-CCNC: 51 U/L (ref 10–40)
BACTERIA #/AREA URNS AUTO: ABNORMAL /HPF
BASOPHILS # BLD AUTO: 0.03 K/UL (ref 0–0.2)
BASOPHILS NFR BLD: 0.3 % (ref 0–1.9)
BILIRUB SERPL-MCNC: 1.6 MG/DL (ref 0.1–1)
BILIRUB UR QL STRIP: NEGATIVE
BUN SERPL-MCNC: 30 MG/DL (ref 6–20)
BUN SERPL-MCNC: 32 MG/DL (ref 6–20)
BUN SERPL-MCNC: 35 MG/DL (ref 6–20)
BUN SERPL-MCNC: 38 MG/DL (ref 6–20)
CALCIUM SERPL-MCNC: 10.1 MG/DL (ref 8.7–10.5)
CALCIUM SERPL-MCNC: 10.5 MG/DL (ref 8.7–10.5)
CALCIUM SERPL-MCNC: 9.6 MG/DL (ref 8.7–10.5)
CALCIUM SERPL-MCNC: 9.9 MG/DL (ref 8.7–10.5)
CHLORIDE SERPL-SCNC: 100 MMOL/L (ref 95–110)
CHLORIDE SERPL-SCNC: 103 MMOL/L (ref 95–110)
CK SERPL-CCNC: 85 U/L (ref 20–180)
CLARITY UR REFRACT.AUTO: ABNORMAL
CO2 SERPL-SCNC: 21 MMOL/L (ref 23–29)
CO2 SERPL-SCNC: 21 MMOL/L (ref 23–29)
CO2 SERPL-SCNC: 24 MMOL/L (ref 23–29)
CO2 SERPL-SCNC: 24 MMOL/L (ref 23–29)
COLOR UR AUTO: YELLOW
CREAT SERPL-MCNC: 0.6 MG/DL (ref 0.5–1.4)
DELSYS: ABNORMAL
DIFFERENTIAL METHOD: ABNORMAL
EOSINOPHIL # BLD AUTO: 0.4 K/UL (ref 0–0.5)
EOSINOPHIL NFR BLD: 4.1 % (ref 0–8)
ERYTHROCYTE [DISTWIDTH] IN BLOOD BY AUTOMATED COUNT: 18.2 % (ref 11.5–14.5)
ERYTHROCYTE [SEDIMENTATION RATE] IN BLOOD BY WESTERGREN METHOD: 10 MM/H
EST. GFR  (NO RACE VARIABLE): >60 ML/MIN/1.73 M^2
FIO2: 40
GLUCOSE SERPL-MCNC: 55 MG/DL (ref 70–110)
GLUCOSE SERPL-MCNC: 71 MG/DL (ref 70–110)
GLUCOSE SERPL-MCNC: 81 MG/DL (ref 70–110)
GLUCOSE SERPL-MCNC: 97 MG/DL (ref 70–110)
GLUCOSE UR QL STRIP: NEGATIVE
HCT VFR BLD AUTO: 32.4 % (ref 37–48.5)
HGB BLD-MCNC: 10 G/DL (ref 12–16)
HGB UR QL STRIP: ABNORMAL
HIV 1+2 AB+HIV1 P24 AG SERPL QL IA: NEGATIVE
HYALINE CASTS UR QL AUTO: 0 /LPF
IMM GRANULOCYTES # BLD AUTO: 0.02 K/UL (ref 0–0.04)
IMM GRANULOCYTES NFR BLD AUTO: 0.2 % (ref 0–0.5)
KETONES UR QL STRIP: ABNORMAL
LEUKOCYTE ESTERASE UR QL STRIP: ABNORMAL
LYMPHOCYTES # BLD AUTO: 0.8 K/UL (ref 1–4.8)
LYMPHOCYTES NFR BLD: 9.1 % (ref 18–48)
MAGNESIUM SERPL-MCNC: 2 MG/DL (ref 1.6–2.6)
MCH RBC QN AUTO: 26.7 PG (ref 27–31)
MCHC RBC AUTO-ENTMCNC: 30.9 G/DL (ref 32–36)
MCV RBC AUTO: 86 FL (ref 82–98)
MICROSCOPIC COMMENT: ABNORMAL
MIN VOL: 7.15
MODE: ABNORMAL
MONOCYTES # BLD AUTO: 0.7 K/UL (ref 0.3–1)
MONOCYTES NFR BLD: 7.6 % (ref 4–15)
NEUTROPHILS # BLD AUTO: 7.3 K/UL (ref 1.8–7.7)
NEUTROPHILS NFR BLD: 78.7 % (ref 38–73)
NITRITE UR QL STRIP: NEGATIVE
NRBC BLD-RTO: 0 /100 WBC
PEEP: 5
PH UR STRIP: 5 [PH] (ref 5–8)
PHOSPHATE SERPL-MCNC: 3.7 MG/DL (ref 2.7–4.5)
PIP: 32
PLATELET # BLD AUTO: 346 K/UL (ref 150–450)
PMV BLD AUTO: 11.2 FL (ref 9.2–12.9)
POCT GLUCOSE: 101 MG/DL (ref 70–110)
POCT GLUCOSE: 101 MG/DL (ref 70–110)
POCT GLUCOSE: 108 MG/DL (ref 70–110)
POCT GLUCOSE: 111 MG/DL (ref 70–110)
POCT GLUCOSE: 161 MG/DL (ref 70–110)
POCT GLUCOSE: 193 MG/DL (ref 70–110)
POCT GLUCOSE: 357 MG/DL (ref 70–110)
POCT GLUCOSE: 44 MG/DL (ref 70–110)
POCT GLUCOSE: 88 MG/DL (ref 70–110)
POCT GLUCOSE: 96 MG/DL (ref 70–110)
POTASSIUM BLD-SCNC: 6.6 MMOL/L (ref 3.5–5.1)
POTASSIUM SERPL-SCNC: 4.4 MMOL/L (ref 3.5–5.1)
POTASSIUM SERPL-SCNC: 4.9 MMOL/L (ref 3.5–5.1)
POTASSIUM SERPL-SCNC: 5.5 MMOL/L (ref 3.5–5.1)
POTASSIUM SERPL-SCNC: 6.2 MMOL/L (ref 3.5–5.1)
PROCALCITONIN SERPL IA-MCNC: 0.15 NG/ML
PROT SERPL-MCNC: 8.8 G/DL (ref 6–8.4)
PROT UR QL STRIP: ABNORMAL
RBC # BLD AUTO: 3.75 M/UL (ref 4–5.4)
RBC #/AREA URNS AUTO: 14 /HPF (ref 0–4)
SAMPLE: ABNORMAL
SARS-COV-2 RDRP RESP QL NAA+PROBE: NEGATIVE
SITE: ABNORMAL
SODIUM SERPL-SCNC: 130 MMOL/L (ref 136–145)
SODIUM SERPL-SCNC: 130 MMOL/L (ref 136–145)
SODIUM SERPL-SCNC: 133 MMOL/L (ref 136–145)
SODIUM SERPL-SCNC: 133 MMOL/L (ref 136–145)
SP GR UR STRIP: 1.01 (ref 1–1.03)
SP02: 98
SQUAMOUS #/AREA URNS AUTO: 1 /HPF
URN SPEC COLLECT METH UR: ABNORMAL
VT: 400
WBC # BLD AUTO: 9.25 K/UL (ref 3.9–12.7)
WBC #/AREA URNS AUTO: 57 /HPF (ref 0–5)

## 2022-08-23 PROCEDURE — 99223 1ST HOSP IP/OBS HIGH 75: CPT | Mod: ,,, | Performed by: INTERNAL MEDICINE

## 2022-08-23 PROCEDURE — 87077 CULTURE AEROBIC IDENTIFY: CPT | Mod: 59 | Performed by: NURSE PRACTITIONER

## 2022-08-23 PROCEDURE — 99900026 HC AIRWAY MAINTENANCE (STAT)

## 2022-08-23 PROCEDURE — 94640 AIRWAY INHALATION TREATMENT: CPT

## 2022-08-23 PROCEDURE — 27000221 HC OXYGEN, UP TO 24 HOURS

## 2022-08-23 PROCEDURE — 99291 CRITICAL CARE FIRST HOUR: CPT | Mod: ,,, | Performed by: NURSE PRACTITIONER

## 2022-08-23 PROCEDURE — 27000207 HC ISOLATION

## 2022-08-23 PROCEDURE — 80053 COMPREHEN METABOLIC PANEL: CPT | Performed by: NURSE PRACTITIONER

## 2022-08-23 PROCEDURE — 87070 CULTURE OTHR SPECIMN AEROBIC: CPT | Performed by: NURSE PRACTITIONER

## 2022-08-23 PROCEDURE — 83735 ASSAY OF MAGNESIUM: CPT | Performed by: NURSE PRACTITIONER

## 2022-08-23 PROCEDURE — 84145 PROCALCITONIN (PCT): CPT | Performed by: STUDENT IN AN ORGANIZED HEALTH CARE EDUCATION/TRAINING PROGRAM

## 2022-08-23 PROCEDURE — 84132 ASSAY OF SERUM POTASSIUM: CPT

## 2022-08-23 PROCEDURE — 94003 VENT MGMT INPAT SUBQ DAY: CPT

## 2022-08-23 PROCEDURE — 20000000 HC ICU ROOM

## 2022-08-23 PROCEDURE — 93010 ELECTROCARDIOGRAM REPORT: CPT | Mod: ,,, | Performed by: INTERNAL MEDICINE

## 2022-08-23 PROCEDURE — 27201112

## 2022-08-23 PROCEDURE — 63600175 PHARM REV CODE 636 W HCPCS: Performed by: INTERNAL MEDICINE

## 2022-08-23 PROCEDURE — 87186 SC STD MICRODIL/AGAR DIL: CPT | Mod: 59 | Performed by: INTERNAL MEDICINE

## 2022-08-23 PROCEDURE — 93005 ELECTROCARDIOGRAM TRACING: CPT

## 2022-08-23 PROCEDURE — 99223 PR INITIAL HOSPITAL CARE,LEVL III: ICD-10-PCS | Mod: ,,, | Performed by: INTERNAL MEDICINE

## 2022-08-23 PROCEDURE — 87040 BLOOD CULTURE FOR BACTERIA: CPT | Mod: 59 | Performed by: STUDENT IN AN ORGANIZED HEALTH CARE EDUCATION/TRAINING PROGRAM

## 2022-08-23 PROCEDURE — 99900035 HC TECH TIME PER 15 MIN (STAT)

## 2022-08-23 PROCEDURE — 87186 SC STD MICRODIL/AGAR DIL: CPT | Mod: 59 | Performed by: NURSE PRACTITIONER

## 2022-08-23 PROCEDURE — 87077 CULTURE AEROBIC IDENTIFY: CPT | Mod: 59 | Performed by: INTERNAL MEDICINE

## 2022-08-23 PROCEDURE — 25000003 PHARM REV CODE 250: Performed by: INTERNAL MEDICINE

## 2022-08-23 PROCEDURE — 87205 SMEAR GRAM STAIN: CPT | Mod: 59 | Performed by: INTERNAL MEDICINE

## 2022-08-23 PROCEDURE — 25000242 PHARM REV CODE 250 ALT 637 W/ HCPCS

## 2022-08-23 PROCEDURE — 87086 URINE CULTURE/COLONY COUNT: CPT | Performed by: STUDENT IN AN ORGANIZED HEALTH CARE EDUCATION/TRAINING PROGRAM

## 2022-08-23 PROCEDURE — 81001 URINALYSIS AUTO W/SCOPE: CPT | Performed by: STUDENT IN AN ORGANIZED HEALTH CARE EDUCATION/TRAINING PROGRAM

## 2022-08-23 PROCEDURE — 94761 N-INVAS EAR/PLS OXIMETRY MLT: CPT

## 2022-08-23 PROCEDURE — 25000003 PHARM REV CODE 250: Performed by: NURSE PRACTITIONER

## 2022-08-23 PROCEDURE — 25000003 PHARM REV CODE 250

## 2022-08-23 PROCEDURE — 87070 CULTURE OTHR SPECIMN AEROBIC: CPT | Mod: 59 | Performed by: INTERNAL MEDICINE

## 2022-08-23 PROCEDURE — 85025 COMPLETE CBC W/AUTO DIFF WBC: CPT | Performed by: NURSE PRACTITIONER

## 2022-08-23 PROCEDURE — 27200966 HC CLOSED SUCTION SYSTEM

## 2022-08-23 PROCEDURE — 63600175 PHARM REV CODE 636 W HCPCS: Performed by: NURSE PRACTITIONER

## 2022-08-23 PROCEDURE — 84100 ASSAY OF PHOSPHORUS: CPT | Performed by: NURSE PRACTITIONER

## 2022-08-23 PROCEDURE — 87205 SMEAR GRAM STAIN: CPT | Performed by: NURSE PRACTITIONER

## 2022-08-23 PROCEDURE — 80048 BASIC METABOLIC PNL TOTAL CA: CPT | Mod: 91,XB

## 2022-08-23 PROCEDURE — 82550 ASSAY OF CK (CPK): CPT | Performed by: NURSE PRACTITIONER

## 2022-08-23 PROCEDURE — 99291 PR CRITICAL CARE, E/M 30-74 MINUTES: ICD-10-PCS | Mod: ,,, | Performed by: NURSE PRACTITIONER

## 2022-08-23 PROCEDURE — 93010 EKG 12-LEAD: ICD-10-PCS | Mod: ,,, | Performed by: INTERNAL MEDICINE

## 2022-08-23 PROCEDURE — 63600175 PHARM REV CODE 636 W HCPCS

## 2022-08-23 RX ORDER — ALBUTEROL SULFATE 2.5 MG/.5ML
10 SOLUTION RESPIRATORY (INHALATION) ONCE
Status: COMPLETED | OUTPATIENT
Start: 2022-08-23 | End: 2022-08-23

## 2022-08-23 RX ORDER — OFLOXACIN 3 MG/ML
1 SOLUTION/ DROPS OPHTHALMIC 4 TIMES DAILY
Status: DISCONTINUED | OUTPATIENT
Start: 2022-08-23 | End: 2022-08-23

## 2022-08-23 RX ORDER — CIPROFLOXACIN HYDROCHLORIDE 3 MG/ML
1 SOLUTION/ DROPS OPHTHALMIC EVERY 4 HOURS
Status: DISCONTINUED | OUTPATIENT
Start: 2022-08-25 | End: 2022-08-26 | Stop reason: HOSPADM

## 2022-08-23 RX ORDER — FAMOTIDINE 20 MG/1
20 TABLET, FILM COATED ORAL 2 TIMES DAILY
Status: DISCONTINUED | OUTPATIENT
Start: 2022-08-23 | End: 2022-08-26 | Stop reason: HOSPADM

## 2022-08-23 RX ORDER — CIPROFLOXACIN HYDROCHLORIDE 3 MG/ML
1 SOLUTION/ DROPS OPHTHALMIC
Status: DISCONTINUED | OUTPATIENT
Start: 2022-08-23 | End: 2022-08-23

## 2022-08-23 RX ORDER — CIPROFLOXACIN HYDROCHLORIDE 3 MG/ML
1 SOLUTION/ DROPS OPHTHALMIC EVERY 4 HOURS
Status: DISCONTINUED | OUTPATIENT
Start: 2022-08-25 | End: 2022-08-23

## 2022-08-23 RX ORDER — FAMOTIDINE 40 MG/5ML
20 POWDER, FOR SUSPENSION ORAL 2 TIMES DAILY
Status: DISCONTINUED | OUTPATIENT
Start: 2022-08-23 | End: 2022-08-23

## 2022-08-23 RX ORDER — CALCIUM GLUCONATE 20 MG/ML
1 INJECTION, SOLUTION INTRAVENOUS EVERY 10 MIN PRN
Status: DISCONTINUED | OUTPATIENT
Start: 2022-08-23 | End: 2022-08-26 | Stop reason: HOSPADM

## 2022-08-23 RX ORDER — SODIUM CHLORIDE 9 MG/ML
INJECTION, SOLUTION INTRAVENOUS CONTINUOUS
Status: DISCONTINUED | OUTPATIENT
Start: 2022-08-23 | End: 2022-08-26 | Stop reason: HOSPADM

## 2022-08-23 RX ORDER — FUROSEMIDE 10 MG/ML
20 INJECTION INTRAMUSCULAR; INTRAVENOUS ONCE
Status: COMPLETED | OUTPATIENT
Start: 2022-08-23 | End: 2022-08-23

## 2022-08-23 RX ORDER — FAMOTIDINE 40 MG/5ML
20 POWDER, FOR SUSPENSION ORAL DAILY
Status: DISCONTINUED | OUTPATIENT
Start: 2022-08-23 | End: 2022-08-23

## 2022-08-23 RX ORDER — CALCIUM GLUCONATE 20 MG/ML
1 INJECTION, SOLUTION INTRAVENOUS ONCE
Status: COMPLETED | OUTPATIENT
Start: 2022-08-23 | End: 2022-08-23

## 2022-08-23 RX ADMIN — CALCIUM GLUCONATE 1 G: 20 INJECTION, SOLUTION INTRAVENOUS at 09:08

## 2022-08-23 RX ADMIN — MEROPENEM 2 G: 1 INJECTION INTRAVENOUS at 12:08

## 2022-08-23 RX ADMIN — SODIUM ZIRCONIUM CYCLOSILICATE 5 G: 5 POWDER, FOR SUSPENSION ORAL at 09:08

## 2022-08-23 RX ADMIN — FAMOTIDINE 20 MG: 20 TABLET ORAL at 09:08

## 2022-08-23 RX ADMIN — HYPROMELLOSE 2910 1 DROP: 5 SOLUTION OPHTHALMIC at 09:08

## 2022-08-23 RX ADMIN — DEXTROSE 250 ML: 10 SOLUTION INTRAVENOUS at 01:08

## 2022-08-23 RX ADMIN — INSULIN HUMAN 10 UNITS: 100 INJECTION, SOLUTION PARENTERAL at 09:08

## 2022-08-23 RX ADMIN — SENNOSIDES 5 ML: 8.8 SYRUP ORAL at 09:08

## 2022-08-23 RX ADMIN — MEROPENEM 2 G: 1 INJECTION INTRAVENOUS at 09:08

## 2022-08-23 RX ADMIN — FUROSEMIDE 20 MG: 10 INJECTION, SOLUTION INTRAMUSCULAR; INTRAVENOUS at 08:08

## 2022-08-23 RX ADMIN — VANCOMYCIN HYDROCHLORIDE 1500 MG: 1.5 INJECTION, POWDER, LYOPHILIZED, FOR SOLUTION INTRAVENOUS at 11:08

## 2022-08-23 RX ADMIN — HYPROMELLOSE 2910 1 DROP: 5 SOLUTION OPHTHALMIC at 02:08

## 2022-08-23 RX ADMIN — MEROPENEM AND SODIUM CHLORIDE 1 G: 1 INJECTION, SOLUTION INTRAVENOUS at 04:08

## 2022-08-23 RX ADMIN — FAMOTIDINE 20 MG: 20 TABLET ORAL at 02:08

## 2022-08-23 RX ADMIN — POLYETHYLENE GLYCOL 3350 17 G: 17 POWDER, FOR SOLUTION ORAL at 08:08

## 2022-08-23 RX ADMIN — DEXTROSE 500 ML: 10 SOLUTION INTRAVENOUS at 08:08

## 2022-08-23 RX ADMIN — ENOXAPARIN SODIUM 40 MG: 40 INJECTION SUBCUTANEOUS at 04:08

## 2022-08-23 RX ADMIN — HYPROMELLOSE 2910 1 DROP: 5 SOLUTION OPHTHALMIC at 11:08

## 2022-08-23 RX ADMIN — ALBUTEROL SULFATE 10 MG: 2.5 SOLUTION RESPIRATORY (INHALATION) at 09:08

## 2022-08-23 RX ADMIN — SODIUM CHLORIDE: 0.9 INJECTION, SOLUTION INTRAVENOUS at 09:08

## 2022-08-23 NOTE — NURSING
Notified Dr. Jimenez that K 5.5. All VSS. MD verbalizes understanding, no new orders at this time.

## 2022-08-23 NOTE — ASSESSMENT & PLAN NOTE
This patient does have evidence of infective focus  My overall impression is sepsis. Vital signs were reviewed and noted in progress note.  Antibiotics given-   Antibiotics (From admission, onward)            Start     Stop Route Frequency Ordered    08/23/22 0300  meropenem-0.9% sodium chloride 1 g/50 mL IVPB         -- IV Every 8 hours (non-standard times) 08/22/22 2236        Cultures were taken-   Microbiology Results (last 7 days)     Procedure Component Value Units Date/Time    Culture, Respiratory with Gram Stain [[id number]]     Order Status: No result Specimen: Respiratory     Urine culture [[id number]] Collected: 08/22/22 2053    Order Status: No result Specimen: Urine Updated: 08/22/22 2117    Blood culture #1 **CANNOT BE ORDERED STAT** [[id number]] Collected: 08/22/22 1951    Order Status: Sent Specimen: Blood from Peripheral, Hand, Left Updated: 08/22/22 2027    Blood culture #2 **CANNOT BE ORDERED STAT** [[id number]] Collected: 08/22/22 2010    Order Status: Sent Specimen: Blood from Peripheral, Forearm, Left Updated: 08/22/22 2027        Latest lactate reviewed, they are-  Recent Labs   Lab 08/22/22 2003   LACTATE 0.6       Organ dysfunction indicated by Acute kidney injury  Source- Urine vs. Lung    Source control Achieved by- Antibiotics    Pt with hx of multiple drug resistant organisms including Proteus, Pseudomonas, Klebsiella and Acinetobacter. Py previously on Zerbexa, unclear duration.     --ID consulted   --Continue Meropenem, Vanc for now  --Respiratory, blood, urine cultures pending

## 2022-08-23 NOTE — SUBJECTIVE & OBJECTIVE
Interval History/Significant Events: NAEO.  Levo increased to 0.22.  GOC discussion ongoing.  Afebrile.    Review of Systems   Unable to perform ROS: Patient nonverbal   Objective:     Vital Signs (Most Recent):  Temp: 96.98 °F (36.1 °C) (08/23/22 1600)  Pulse: 96 (08/23/22 1600)  Resp: 16 (08/23/22 1600)  BP: 111/68 (08/23/22 1600)  SpO2: 96 % (08/23/22 1600) Vital Signs (24h Range):  Temp:  [93.5 °F (34.2 °C)-97.7 °F (36.5 °C)] 96.98 °F (36.1 °C)  Pulse:  [] 96  Resp:  [16-24] 16  SpO2:  [95 %-100 %] 96 %  BP: ()/(54-85) 111/68   Weight: 77.7 kg (171 lb 4.8 oz)  Body mass index is 33.45 kg/m².      Intake/Output Summary (Last 24 hours) at 8/23/2022 1647  Last data filed at 8/23/2022 1600  Gross per 24 hour   Intake 1970.67 ml   Output 1695 ml   Net 275.67 ml       Physical Exam  Vitals and nursing note reviewed.   Constitutional:       Appearance: She is ill-appearing.   HENT:      Head: Atraumatic.      Right Ear: External ear normal.      Left Ear: External ear normal.      Nose: Nose normal. No rhinorrhea.      Mouth/Throat:      Mouth: Mucous membranes are dry.   Eyes:      Comments: L eye cataract, Roaming eye movements, unable to focus or track   Cardiovascular:      Rate and Rhythm: Normal rate and regular rhythm.      Pulses: Normal pulses.      Heart sounds: Normal heart sounds.   Pulmonary:      Comments: Trach/Vent dependent  Abdominal:      General: There is distension.   Genitourinary:     Comments: Hamilton catheter in place  Musculoskeletal:      Right lower leg: No edema.      Left lower leg: No edema.      Comments: Withdrawal to pain, upper extremity contracture  RUE swelling   Skin:     General: Skin is warm and dry.      Coloration: Skin is pale.   Neurological:      Mental Status: Mental status is at baseline.       Vents:  Vent Mode: A/C (08/23/22 1502)  Ventilator Initiated: Yes (08/22/22 2034)  Set Rate: 14 BPM (08/23/22 1502)  Vt Set: 400 mL (08/23/22 1502)  PEEP/CPAP: 5 cmH20  (08/23/22 1502)  Oxygen Concentration (%): 25 (08/23/22 1600)  Peak Airway Pressure: 25 cmH2O (08/23/22 1502)  Plateau Pressure: 29 cmH20 (08/23/22 1502)  Total Ve: 7.04 mL (08/23/22 1502)  Negative Inspiratory Force (cm H2O): 0 (08/23/22 1502)  F/VT Ratio<105 (RSBI): (!) 37.95 (08/23/22 1502)  Lines/Drains/Airways       Drain  Duration                  Biliary Tube RUQ -- days         Gastrostomy/Enterostomy LUQ -- days         Biliary Tube 01/13/22 222 days         Urethral Catheter 08/23/22 1445 Silicone 18 Fr. <1 day              Airway  Duration                  Surgical Airway Portex Cuffed -- days              Peripheral Intravenous Line  Duration                  Midline Catheter Insertion/Assessment  - Single Lumen 08/19/22 Left brachial vein 4 days         Peripheral IV - Single Lumen 08/22/22 1957 20 G Left Hand <1 day         Peripheral IV - Single Lumen 08/22/22 2015 22 G Left Forearm <1 day                  Significant Labs:    CBC/Anemia Profile:  Recent Labs   Lab 08/22/22 2003 08/22/22 2019 08/23/22  0504   WBC 7.05  --  9.25   HGB 10.8*  --  10.0*   HCT 34.6* 34* 32.4*     --  346   MCV 84  --  86   RDW 18.6*  --  18.2*        Chemistries:  Recent Labs   Lab 08/22/22 2003 08/22/22  2217 08/23/22  0504 08/23/22  1212   * 130* 130* 130*   K 6.1* 4.9 6.2* 4.4    105 100 100   CO2 21* 19* 21* 21*   BUN 38* 36* 38* 35*   CREATININE 0.5 0.6 0.6 0.6   CALCIUM 10.1 10.1 9.6 10.5   ALBUMIN 3.2*  --  3.2*  --    PROT 9.8*  --  8.8*  --    BILITOT 1.1*  --  1.6*  --    ALKPHOS 201*  --  193*  --    ALT 42  --  48*  --    AST 44*  --  51*  --    MG 2.1  --  2.0  --    PHOS  --   --  3.7  --        Urine Culture: No results for input(s): LABURIN in the last 48 hours.  Urine Studies:   Recent Labs   Lab 08/23/22  1415   COLORU Yellow   APPEARANCEUA Hazy*   PHUR 5.0   SPECGRAV 1.015   PROTEINUA 1+*   GLUCUA Negative   KETONESU Trace*   BILIRUBINUA Negative   OCCULTUA 1+*   NITRITE Negative    LEUKOCYTESUR 3+*   RBCUA 14*   WBCUA 57*   BACTERIA Few*   SQUAMEPITHEL 1   HYALINECASTS 0     All pertinent labs within the past 24 hours have been reviewed.    Significant Imaging:  I have reviewed all pertinent imaging results/findings within the past 24 hours.

## 2022-08-23 NOTE — CONSULTS
[facility] - [facility]  Adult Nutrition  Consult Note    SUMMARY     Recommendations    1. When medically feasible, initiate TFs. Rec'd Isosource 1.5 @ 40 mL/hr to provide 1440 kcals, 65 g of protein, 733 mL fluid.   2. RD to monitor & follow-up.    Goals: Meet % EEN, EPN by RD f/u date  Nutrition Goal Status: new  Communication of RD Recs: reviewed with RN    Assessment and Plan    Nutrition Problem:  Inadequate energy intake    Related to (etiology):   Inability to consume sufficient energy     Signs and Symptoms (as evidenced by):   NPO    Interventions(treatment strategy):  Collaboration of nutrition care w/ other providers  EN    Nutrition Diagnosis Status:   New    Reason for Assessment    Reason For Assessment: consult  Diagnosis: other (see comments) (Sepsis)  Relevant Medical History: Anoxic brain injury, PEG/Trach, Vent dependent  Interdisciplinary Rounds: attended    General Information Comments: Trach to vent; PEG present. Pt presents from NH. Per RD Lamar note (8/4): pt on Jevity 1.5 @ 50 mL/hr & UBW of 160#. Pt appears nourished w/ no indicators of malnutrition. Noted palliative care has been consulted.  Nutrition Discharge Planning: Adequate nutrition    Nutrition/Diet History    Factors Affecting Nutritional Intake: on mechanical ventilation, NPO  Nutrition Support Formula Prior to Admit: Jevity 1.5  Nutrition Support Rate Prior to Admit: 50 mL/hr    Anthropometrics    Temp: 96.98 °F (36.1 °C)  Height Method: Stated  Height: 5' (152.4 cm)  Height (inches): 60 in  Weight Method: Bed Scale  Weight: 77.7 kg (171 lb 4.8 oz)  Weight (lb): 171.3 lb  Ideal Body Weight (IBW), Female: 100 lb  % Ideal Body Weight, Female (lb): 171.3 %  BMI (Calculated): 33.5  BMI Grade: 30 - 34.9- obesity - grade I    Lab/Procedures/Meds    Pertinent Labs Reviewed: reviewed  Pertinent Labs Comments: Na 130, K 6.2, BUN 38  Pertinent Medications Reviewed: reviewed    Estimated/Assessed Needs    Weight Used For  Calorie Calculations: 77.7 kg (171 lb 4.8 oz)     Energy Calorie Requirements (kcal): 1442 kcal/d  Energy Need Method: [facility]     Protein Requirements: 68-91 g/d (1.5-2 g/kg IBW)  Weight Used For Protein Calculations: 45.5 kg (100 lb 5 oz)     Estimated Fluid Requirement Method: other (see comments) (Per MD or 1 mL/kcal)  RDA Method (mL): 1442    Nutrition Prescription Ordered    Current Diet Order: NPO    Evaluation of Received Nutrient/Fluid Intake    I/O: +1.4L since admit    Comments: LBM: 8/23    Nutrition Risk    Level of Risk/Frequency of Follow-up:  (1x/week)     Monitor and Evaluation    Food and Nutrient Intake: enteral nutrition intake  Food and Nutrient Adminstration: enteral and parenteral nutrition administration  Physical Activity and Function: nutrition-related ADLs and IADLs  Anthropometric Measurements: weight, weight change  Biochemical Data, Medical Tests and Procedures: inflammatory profile, lipid profile, glucose/endocrine profile, gastrointestinal profile, electrolyte and renal panel  Nutrition-Focused Physical Findings: overall appearance     Nutrition Follow-Up    RD Follow-up?: Yes

## 2022-08-23 NOTE — H&P
[facility] - Emergency Dept  Critical Care Medicine  History & Physical    Patient Name: Genna Felix  MRN: [id number]  Admission Date: 8/22/2022  Hospital Length of Stay: 1 days  Code Status: Full Code  Attending Physician: Tonny Aparicio MD   Primary Care Provider: Primary Doctor No   Principal Problem: <principal problem not specified>    Subjective:     HPI:  Genna Felix is a 61-year-old  female with a PMHx of asthma, GERD s/p cardiac arrest with anoxic brain injury in 2020, now in a persistent vegetative state with a trach/PEG and vent dependent who is a resident of [facility]. Pt was brought to the ED by EMS for right arm swelling. Upon arrival to the ED she was found to be hyperkalemic to 6.2 and hypothermic concerning for sepsis.     In the ED the patient was given 10 units of IV insulin and D10. UA + for bacteria, +3 Leuks, 44 WBC. Urine culture pending. Of note patient was hospitalized in the beginning of this month and respiratory culture was positible for Klebsiella ESBL, and Acinetobacter . It is unclear if she was treated for these bacteria. Respiratory cultures from 07/30 were positive for multidrug resistant psuedomonas and Proteus ESBL. The patient was treated with a course of Zerbaxa, duration unclear. Pt presented with RUQ cholecystostomy tube that was placed in 01/2022 at [place]. Hamilton dependent.     Critical care medicine was consulted for sepsis, trach w/vent dependency. She will be admitted to MICU. Started on Meropenem/Vanc. ID consulted.           Hospital/ICU Course:  No notes on file     Past Medical History:   Diagnosis Date    Anoxic brain damage 07/2020    Bedbound 07/2020    Cardiac arrest 07/2020    Cirrhosis     GERD (gastroesophageal reflux disease)     Hemangioma of intra-abdominal structure     Hypertension     Nursing home resident     Protein calorie malnutrition     Pulmonary embolus     Respiratory distress     S/P percutaneous  endoscopic gastrostomy (PEG) tube placement 07/2020    Total self-care deficit 07/2020    Tracheostomy dependence     7/2020       Past Surgical History:   Procedure Laterality Date    PEG W/TRACHEOSTOMY PLACEMENT  07/27/2020    SKIN GRAFT      buttocks       Review of patient's allergies indicates:  No Known Allergies    Family History       Problem Relation (Age of Onset)    No Known Problems Mother, Father          Tobacco Use    Smoking status: Never Smoker    Smokeless tobacco: Never Used   Substance and Sexual Activity    Alcohol use: Not Currently    Drug use: Not Currently    Sexual activity: Not Currently      Review of Systems   Unable to perform ROS: Patient unresponsive   Objective:     Vital Signs (Most Recent):  Temp: (!) 93.5 °F (34.2 °C) (08/22/22 2207)  Pulse: 80 (08/22/22 2310)  Resp: 18 (08/22/22 2310)  BP: 114/80 (08/22/22 2207)  SpO2: 100 % (08/22/22 2310)   Vital Signs (24h Range):  Temp:  [93.5 °F (34.2 °C)-94.9 °F (34.9 °C)] 93.5 °F (34.2 °C)  Pulse:  [70-87] 80  Resp:  [18-22] 18  SpO2:  [97 %-100 %] 100 %  BP: ()/(70-85) 114/80   Weight: 76.6 kg (168 lb 14 oz)  Body mass index is 32.98 kg/m².      Intake/Output Summary (Last 24 hours) at 8/22/2022 2330  Last data filed at 8/22/2022 2310  Gross per 24 hour   Intake 500 ml   Output --   Net 500 ml       Physical Exam  Vitals and nursing note reviewed.   Constitutional:       Appearance: She is ill-appearing.   HENT:      Mouth/Throat:      Mouth: Mucous membranes are dry.   Eyes:      Comments: L eye cataract, Roaming eye movements, unable to focus or track   Cardiovascular:      Rate and Rhythm: Normal rate and regular rhythm.      Pulses: Normal pulses.      Heart sounds: Normal heart sounds.   Pulmonary:      Comments: Trach/Vent dependent  Abdominal:      General: There is distension.   Genitourinary:     Comments: Hamilton catheter in place  Musculoskeletal:      Comments: Withdrawal to pain  RUE swelling   Skin:     General:  Skin is warm and dry.      Coloration: Skin is pale.   Neurological:      Mental Status: Mental status is at baseline.       Vents:  Ventilator Initiated: Yes (08/22/22 2034)  Oxygen Concentration (%): 40 (08/22/22 2310)  Lines/Drains/Airways       Peripherally Inserted Central Catheter Line  Duration             PICC Single Lumen 08/22/22 1945 left brachial <1 day              Drain  Duration                  Gastrostomy/Enterostomy LUQ -- days         Biliary Tube 01/13/22 221 days         Urethral Catheter 08/02/22 0515 Non-latex 18 Fr. 20 days              Airway  Duration                  Airway - Non-Surgical Other (Comment) -- days         Surgical Airway Portex Cuffed -- days              Peripheral Intravenous Line  Duration                  Peripheral IV - Single Lumen 08/22/22 1957 20 G Left Hand <1 day         Peripheral IV - Single Lumen 08/22/22 2015 22 G Left Forearm <1 day                  Significant Labs:    CBC/Anemia Profile:  Recent Labs   Lab 08/22/22 2003 08/22/22 2019   WBC 7.05  --    HGB 10.8*  --    HCT 34.6* 34*     --    MCV 84  --    RDW 18.6*  --         Chemistries:  Recent Labs   Lab 08/22/22 2003 08/22/22 2217   * 130*   K 6.1* 4.9    105   CO2 21* 19*   BUN 38* 36*   CREATININE 0.5 0.6   CALCIUM 10.1 10.1   ALBUMIN 3.2*  --    PROT 9.8*  --    BILITOT 1.1*  --    ALKPHOS 201*  --    ALT 42  --    AST 44*  --    MG 2.1  --        All pertinent labs within the past 24 hours have been reviewed.    Significant Imaging: I have reviewed all pertinent imaging results/findings within the past 24 hours.    Assessment/Plan:     Neuro  Persistent vegetative state  --Pt remains in persistent vegetative state, withdraws to painful stimuli    ENT  Tracheostomy dependence  --Trach care per protocol    Pulmonary  Ventilator dependence  -See chronic hypoxic respiratory failure    Chronic respiratory failure with hypoxia  Patient with Hypoxic Respiratory failure which is  Chronic.  she is on home oxygen at On ventilator LPM. Supplemental oxygen was provided and noted- Oxygen Concentration (%):  [40] 40  Resp Rate Total:  [18 br/min-28 br/min] 20 br/min.   Signs/symptoms of respiratory failure include- respiratory distress. Contributing diagnoses includes - Pneumonia Labs and images were reviewed. Patient Has recent ABG, which has been reviewed. Will treat underlying causes and adjust management of respiratory failure as follows-     -Duonebs q 6 hrs  -VAP protocol in place  -Lung protective ventilation    ID  Sepsis  This patient does have evidence of infective focus  My overall impression is sepsis. Vital signs were reviewed and noted in progress note.  Antibiotics given-   Antibiotics (From admission, onward)            Start     Stop Route Frequency Ordered    08/23/22 0300  meropenem-0.9% sodium chloride 1 g/50 mL IVPB         -- IV Every 8 hours (non-standard times) 08/22/22 2236        Cultures were taken-   Microbiology Results (last 7 days)     Procedure Component Value Units Date/Time    Culture, Respiratory with Gram Stain [[id number]]     Order Status: No result Specimen: Respiratory     Urine culture [[id number]] Collected: 08/22/22 2053    Order Status: No result Specimen: Urine Updated: 08/22/22 2117    Blood culture #1 **CANNOT BE ORDERED STAT** [[id number]] Collected: 08/22/22 1951    Order Status: Sent Specimen: Blood from Peripheral, Hand, Left Updated: 08/22/22 2027    Blood culture #2 **CANNOT BE ORDERED STAT** [[id number]] Collected: 08/22/22 2010    Order Status: Sent Specimen: Blood from Peripheral, Forearm, Left Updated: 08/22/22 2027        Latest lactate reviewed, they are-  Recent Labs   Lab 08/22/22 2003   LACTATE 0.6       Organ dysfunction indicated by Acute kidney injury  Source- Urine vs. Lung    Source control Achieved by- Antibiotics    Pt with hx of multiple drug resistant organisms including Proteus, Pseudomonas, Klebsiella and Acinetobacter. Py  previously on Zerbexa, unclear duration.     --ID consulted   --Continue Meropenem, Vanc for now  --Respiratory, blood, urine cultures pending        Oncology  Anemia of chronic disease  --Daily CBC    GI  Cholecystostomy care  Cholecystomy tube placed at [facility] 01/2022    --IR consulted for drain evaluation  --Monitor drain output  --Site Care    PEG (percutaneous endoscopic gastrostomy) status  --PEG care per protocol  --Place PEG ttube to suction secondary to abdominal distension    Orthopedic  Swelling of arm  Right arm swelling that hs been progressing according to nursing home    --Elevate RUE  --RUE ultrasound pending  --Lovenox for DVT prophylaxis    Wounds, multiple  --Wound care consulted  --Pressure reduction strategies, Turn q2        Critical Care Daily Checklist:    A: Awake: RASS Goal/Actual Goal:    Actual:     B: Spontaneous Breathing Trial Performed?     C: SAT & SBT Coordinated?  N/A                      D: Delirium: CAM-ICU     E: Early Mobility Performed? No   F: Feeding Goal:    Status:     Current Diet Order   Procedures    Diet NPO      AS: Analgesia/Sedation None   T: Thromboembolic Prophylaxis Lovenox   H: HOB > 300 Yes   U: Stress Ulcer Prophylaxis (if needed) Famotidine   G: Glucose Control 140-180   B: Bowel Function     I: Indwelling Catheter (Lines & Hamilton) Necessity Hamilton, [place] tube, Trach   D: De-escalation of Antimicrobials/Pharmacotherapies ID consult    Plan for the day/ETD F/U cultures    Code Status:  Family/Goals of Care: Full Code  Ongoing     Critical Care Time: 60 minutes  Critical secondary to Patient has a condition that poses threat to life and bodily function: Sepsis, Chronic hypoxic respiratory failure, vent dependent     Critical care was time spent personally by me on the following activities: development of treatment plan with patient or surrogate and bedside caregivers, discussions with consultants, evaluation of patient's response to treatment,  examination of patient, ordering and performing treatments and interventions, ordering and review of laboratory studies, ordering and review of radiographic studies, pulse oximetry, re-evaluation of patient's condition. This critical care time did not overlap with that of any other provider or involve time for any procedures.     Mabel Linda, NEHEMIAH  Critical Care Medicine  [facility] - Emergency Dept

## 2022-08-23 NOTE — HOSPITAL COURSE
DVT without evidence of clot.  CXR with coarse increased interstitial attenuation and patchy bibasilar infiltrates.  CTA without SBO, stable tube and decompressed gallbladder with cholelithiasis.  Bxl NGTD.  Hyperkalemic again thus shifted.  Given lasix and lokelma.  Repeat K 4.4  UA and Ucx taken before Hamilton exchange.  Hamilton replaced and repeat UA with 3+ leuks and 57 WBC.  ID onboard.  Technically has MEGAN as SCr bumped from 0.3 to 0.6.  Started tube feeds.  Ongoing GOC with family. 1/2 Bxl with [facility].  Repeat cultures ordered. Per consultation with ID, blood, urine, and sputum cultures represent contaminants. However, do believe the patient should be treated for RUE cellulitis with doxycycline (which she can discharge on). CM to assist with transfer back to NH. Discharged to continue 7 more doses of doxycycline for cellulitis.

## 2022-08-23 NOTE — ED PROVIDER NOTES
Source of History  Patient documentation    Chief Complaint    Abnormal Lab (Pt arrives from [facility] for abnormal labs. Unknown abnormal labs. Staff unable to inform ems. Pt is on a ventilator at current baseline. )      History of Present Illness    Genna Felix is a 61 y.o. female presenting with unclear reasons.  The patient is trach, peg, and at baseline is nonverbal secondary to anoxic brain injury from documented cardiac arrest at some point in the past.  She remains full code.  Reportedly there is an abnormal labs, it is unclear exactly what this is.  She is on her baseline vent settings, and has stable vital signs that are within normal limits.  Patient has known multiple wound lesions that are being cared for at the long term care facility.    Review of Systems    As per HPI and below:  Cannot obtain secondary to mental status    Past History    As per HPI and below:  Past Medical History:   Diagnosis Date    Anoxic brain damage 07/2020    Bedbound 07/2020    Cardiac arrest 07/2020    Cirrhosis     GERD (gastroesophageal reflux disease)     Hemangioma of intra-abdominal structure     Hypertension     Nursing home resident     Protein calorie malnutrition     Pulmonary embolus     Respiratory distress     S/P percutaneous endoscopic gastrostomy (PEG) tube placement 07/2020    Total self-care deficit 07/2020    Tracheostomy dependence     7/2020       Past Surgical History:   Procedure Laterality Date    PEG W/TRACHEOSTOMY PLACEMENT  07/27/2020    SKIN GRAFT      buttocks       Social History     Socioeconomic History    Marital status:    Tobacco Use    Smoking status: Never Smoker    Smokeless tobacco: Never Used   Substance and Sexual Activity    Alcohol use: Not Currently    Drug use: Not Currently    Sexual activity: Not Currently       Family History   Problem Relation Age of Onset    No Known Problems Mother     No Known Problems Father        Review  of patient's allergies indicates:  No Known Allergies    No current facility-administered medications on file prior to encounter.     Current Outpatient Medications on File Prior to Encounter   Medication Sig Dispense Refill    0.9 % sodium chloride (SODIUM CHLORIDE 0.9 %) PgBk Inject into the vein.      acetaminophen (TYLENOL) 325 MG tablet 2 tablets (650 mg total) by Per G Tube route every 4 (four) hours as needed for Pain or Temperature greater than (100.4F).  0    acetylcysteine 100 mg/ml, 10%, (MUCOMYST) 100 mg/mL (10 %) nebulizer solution Take 4 mLs by nebulization every 8 (eight) hours.  12    albuterol-ipratropium (DUO-NEB) 2.5 mg-0.5 mg/3 mL nebulizer solution Take 3 mLs by nebulization every 6 (six) hours. Rescue 1 Box 0    arginine/glutamine/calcium bmb (XIOMARA ORAL) 1 Package by PEG Tube route 2 (two) times a day. In 8oz of water      artificial tears (ISOPTO TEARS) 0.5 % ophthalmic solution Place 1 drop into both eyes as needed.      chlorhexidine (PERIDEX) 0.12 % solution SMARTSIG:By Mouth      clonazePAM (KLONOPIN) 0.5 MG tablet Take 0.5 mg by mouth 2 (two) times daily.      ergocalciferol (ERGOCALCIFEROL) 50,000 unit Cap Take 50,000 Units by mouth every 7 days.      ferrous sulfate 300 mg (60 mg iron)/5 mL syrup Take 300 mg by mouth As instructed. Every other day      lactose-reduced food (ISOSOURCE ORAL) 1.5 per peg tube at 50ml/hr continuously      midodrine (PROAMATINE) 10 MG tablet 1 tablet (10 mg total) by Per G Tube route 3 (three) times daily. 90 tablet 11    oxybutynin (DITROPAN) 5 MG Tab Take 5 mg by mouth 2 (two) times daily.      polyethylene glycol (GLYCOLAX) 17 gram PwPk Take by mouth daily as needed.      tobramycin (NEBCIN) 40 mg/mL injection       whey protein isolate (BENEPROTEIN) 6 gram-25 kcal/7 gram Powd 7 g by PEG Tube route 2 (two) times a day. In 2oz of water         Physical Exam    Reviewed nursing notes.  Vitals:    08/22/22 2018 08/22/22 2025 08/22/22 2874  08/22/22 2052   BP:   117/85    Pulse: 81  75    Resp: 18  19    Temp:  (!) 94.9 °F (34.9 °C)     TempSrc:  Rectal     SpO2: 99%  99%    Weight:    76.6 kg (168 lb 14 oz)     General:  Nonverbal, unable to interact with history and physical  Skin:  Warm, dry, intact.  Chronic wounds as documented previously - R elbow, backside (pink, no acute infection)  Head:  Normocephalic, atraumatic.    Neck:  Supple.   HEENT:  Pupils are equal and round, tacky mucous membranes, trach in place   Cardiovascular:  Regular rate and rhythm, Normal peripheral perfusion, No edema.    Respiratory:  Lungs with crackles, vented  Gastrointestinal:  Soft, slightly distended but not tympanic, there is a PEG tube in place with some clear drainage, and another tube in the right side of the abdomen with drainage into a bad.    Back:  Wound as documented by nursing without evidence of infection  Musculoskeletal:  Myoclonic jerking of the right upper extremity which is chronic, R hand edema spreading up arm  Neurological:  Nonverbal, does not respond to tactile stimuli or verbal stimuli  Psychiatric:  Cannot assess        Initial MDM    61-year-old female nonverbal secondary to anoxic brain injury with trach and PEG presenting with abnormal labs from long-term care facility.    We discussed case with Kimberlyn BOSS -   She was supposed to go to [facility] where she was admitted last month  They diverted the patient here  She was supposed to go for her RUE / hand swelling. They do not have any abnormal labs to report.    There is some edema and contracture to the R hand and a chronic appearing wound ... will check XR, Rue doppler to r/o dvt    Hypothermic on initial eval in the ED.  Sepsis vs other source (environmental?), will give broad spectrum abx, culture (ua, ucx, cxr) and plan for admission..     9:06 PM    Hyperkalemia noted  ekg without acute findings  Dextrose --> insulin for temporization ordered for now   Awaiting imaging    10:00  PM  Case discussed with ICU for admission given vented status  She does not require any additional settings over baseline, CXR without new or worsening consolidations      I decided to obtain the patient's medical records.    Medications   vancomycin 1.25 g in dextrose 5% 250 mL IVPB (ready to mix) (has no administration in time range)   piperacillin-tazobactam 4.5 g in sodium chloride 0.9% 100 mL IVPB (ready to mix system) (4.5 g Intravenous New Bag 8/22/22 2100)   insulin regular injection 10 Units 0.1 mL (has no administration in time range)   dextrose 10% bolus 250 mL (has no administration in time range)       Results and ED Course    Labs Reviewed   CBC W/ AUTO DIFFERENTIAL - Abnormal; Notable for the following components:       Result Value    Hemoglobin 10.8 (*)     Hematocrit 34.6 (*)     MCH 26.3 (*)     MCHC 31.2 (*)     RDW 18.6 (*)     Lymph % 17.9 (*)     All other components within normal limits    Narrative:     Release to patient->Immediate   COMPREHENSIVE METABOLIC PANEL - Abnormal; Notable for the following components:    Sodium 132 (*)     Potassium 6.1 (*)     CO2 21 (*)     BUN 38 (*)     Total Protein 9.8 (*)     Albumin 3.2 (*)     Total Bilirubin 1.1 (*)     Alkaline Phosphatase 201 (*)     AST 44 (*)     Anion Gap 7 (*)     All other components within normal limits    Narrative:     Release to patient->Immediate   ISTAT PROCEDURE - Abnormal; Notable for the following components:    POC PO2 101 (*)     POC HCO3 22.7 (*)     All other components within normal limits   ISTAT PROCEDURE - Abnormal; Notable for the following components:    POC BUN 38 (*)     POC Creatinine 0.4 (*)     POC Potassium 6.2 (*)     POC Hematocrit 34 (*)     All other components within normal limits   CULTURE, BLOOD   CULTURE, BLOOD   LACTIC ACID, PLASMA    Narrative:     Release to patient->Immediate   LIPASE    Narrative:     Release to patient->Immediate   MAGNESIUM    Narrative:     Release to patient->Immediate    PROTIME-INR    Narrative:     Release to patient->Immediate   TROPONIN I    Narrative:     Release to patient->Immediate   HIV 1 / 2 ANTIBODY   HEPATITIS C ANTIBODY   TSH   URINALYSIS, REFLEX TO URINE CULTURE   URINALYSIS MICROSCOPIC   TYPE & SCREEN   ISTAT CHEM8   POCT GLUCOSE MONITORING CONTINUOUS       Imaging Results          X-Ray Chest 1 View (In process)                          EKG (if obtained)    EKG  Performed: 2000  Rate/Rhythm/Axis: 81 bpm, NS rhythm, nml axis  QRS 84 ms  Qtc 422 ms  Impression: NSR with no T wave changes.EKG without evidence of Na channel blockade, K channel blockade, there is no prolonged QTc or digoxin effect.  There is no STEMI or signs of ischemia.        Impression and Plan    61 y.o. female with history as above from LTAC for admission for hyperkalemia, hypothermia of unclear etiology, r/o sepsis, eval for hand swelling with doppler/xr    ICU notified of admission given vent dependence    Critical Care:  Date: 08/23/2022  Performed by: Dr. Stef Bright  Authorized by: Dr. Setf Bright   Total critical care time (exclusive of procedural time) : 35 minutes  Upon my evaluation, this patient had a high probability of imminent or life-threatening deterioration due to [ hyperkalemia, hypothermia, sepsis  ] which required my direct attention, intervention and personal management.  Critical care was necessary to treat or prevent imminent or life-threatening deterioration.  This may include review of laboratory data, radiology results, discussion with consultants and family, and monitoring for potential decompensations. Interventions were performed as documented.             Final diagnoses:  [R06.02] Shortness of breath  [E87.5] Hyperkalemia (Primary)  [M79.89] Arm swelling  [T68.XXXA] Hypothermia, initial encounter                   Stef Bright DO  08/23/22 1049

## 2022-08-23 NOTE — HPI
Genna Felix is a 61-year-old  female with a PMHx of asthma, GERD s/p cardiac arrest with anoxic brain injury in 2020, now in a persistent vegetative state with a trach/PEG and vent dependent who is a resident of [facility]. Pt was brought to the ED by EMS for right arm swelling. Upon arrival to the ED she was found to be hyperkalemic to 6.2 and hypothermic concerning for sepsis.     In the ED the patient was given 10 units of IV insulin and D10. UA + for bacteria, +3 Leuks, 44 WBC. Urine culture pending. Of note patient was hospitalized in the beginning of this month and respiratory culture was positible for Klebsiella ESBL, and Acinetobacter . It is unclear if she was treated for these bacteria. Respiratory cultures from 07/30 were positive for multidrug resistant psuedomonas and Proteus ESBL. The patient was treated with a course of Zerbaxa, duration unclear. Pt presented with RUQ cholecystostomy tube that was placed in 01/2022 at [place]. Hamilton dependent.     Critical care medicine was consulted for sepsis, trach w/vent dependency. She will be admitted to MICU. Started on Meropenem/Vanc. ID consulted.

## 2022-08-23 NOTE — PLAN OF CARE
[facility]  Initial Discharge Assessment       Primary Care Provider: Primary Doctor No    Admission Diagnosis: Shortness of breath [R06.02]  Hyperkalemia [E87.5]  Arm swelling [M79.89]  Hypothermia, initial encounter [T68.XXXA]    Admission Date: 8/22/2022  Expected Discharge Date: 8/26/2022    Discharge Barriers Identified: None    Payor: MEDICARE / Plan: MEDICARE PART A & B / Product Type: Government /     Extended Emergency Contact Information  Primary Emergency Contact: Amelia Hidalgo  Mobile Phone: [phone]  Relation: Sister  Preferred language: English   needed? No  Secondary Emergency Contact: Traci Felixtany  Mobile Phone: [phone]  Relation: Daughter    Discharge Plan A: Return to nursing home         [facility] - [address] [place]  [address] [facility] [address]  Phone: [phone] Fax: [phone]    SW completed dc assessment with pts jeremiah Clements, as pt is intubated and unable to communicate. Pt lives at [facility]; is a total assist with mobility and ADLs. She is bed bound and vent dependent, has a PEG tube as well. Upon d/c, family would like pt to return to [facility]. Pt will need ambulance transport.    Initial Assessment (most recent)     Adult Discharge Assessment - 08/23/22 1543        Discharge Assessment    Assessment Type Discharge Planning Assessment     Confirmed/corrected address, phone number and insurance Yes     Confirmed Demographics Correct on Facesheet     Source of Information family     If unable to respond/provide information was family/caregiver contacted? Yes     Contact Name/Number Starr Felix [phone]     Lives With facility resident     Facility Arrived From: [facility]     Do you expect to return to your current living situation? Yes     Do you have help at home or someone to help you manage your care at home? Yes     Who are your caregiver(s) and their phone number(s)? Facility staff      Prior to hospitilization cognitive status: Coma/Sedated/Intubated     Current cognitive status: Coma/Sedated/Intubated     Walking or Climbing Stairs Difficulty ambulation difficulty, dependent;stair climbing difficulty, dependent;transferring difficulty, dependent     Mobility Management bed bound     Dressing/Bathing Difficulty bathing difficulty, dependent;dressing difficulty, dependent     Dressing/Bathing Management facility staff     Equipment Currently Used at Home ventilator;hospital bed     Readmission within 30 days? Yes     Patient currently being followed by outpatient case management? No     Do you currently have service(s) that help you manage your care at home? No     Do you take prescription medications? Yes     Do you have prescription coverage? Yes     Do you have any problems affording any of your prescribed medications? No     Is the patient taking medications as prescribed? yes     Who is going to help you get home at discharge? ambulance     How do you get to doctors appointments? health plan transportation;other (see comments)   facility/ambulance service    Are you on dialysis? No     Do you take coumadin? No     Discharge Plan A Return to nursing home     DME Needed Upon Discharge  none     Discharge Plan discussed with: Adult children     Discharge Barriers Identified None               JUAN Novoa

## 2022-08-23 NOTE — PLAN OF CARE
Problem: Communication Impairment (Mechanical Ventilation, Invasive)  Goal: Effective Communication  Outcome: Ongoing, Progressing     Problem: Device-Related Complication Risk (Mechanical Ventilation, Invasive)  Goal: Optimal Device Function  Outcome: Ongoing, Progressing     Problem: Inability to Wean (Mechanical Ventilation, Invasive)  Goal: Mechanical Ventilation Liberation  Outcome: Ongoing, Progressing     Problem: Nutrition Impairment (Mechanical Ventilation, Invasive)  Goal: Optimal Nutrition Delivery  Outcome: Ongoing, Progressing     Problem: Skin and Tissue Injury (Mechanical Ventilation, Invasive)  Goal: Absence of Device-Related Skin and Tissue Injury  Outcome: Ongoing, Progressing     Problem: Ventilator-Induced Lung Injury (Mechanical Ventilation, Invasive)  Goal: Absence of Ventilator-Induced Lung Injury  Outcome: Ongoing, Progressing     Problem: Communication Impairment (Artificial Airway)  Goal: Effective Communication  Outcome: Ongoing, Progressing     Problem: Device-Related Complication Risk (Artificial Airway)  Goal: Optimal Device Function  Outcome: Ongoing, Progressing     Problem: Skin and Tissue Injury (Artificial Airway)  Goal: Absence of Device-Related Skin or Tissue Injury  Outcome: Ongoing, Progressing     Problem: Noninvasive Ventilation Acute  Goal: Effective Unassisted Ventilation and Oxygenation  Outcome: Ongoing, Progressing     Problem: Adult Inpatient Plan of Care  Goal: Plan of Care Review  Outcome: Ongoing, Progressing  Goal: Patient-Specific Goal (Individualized)  Outcome: Ongoing, Progressing  Goal: Absence of Hospital-Acquired Illness or Injury  Outcome: Ongoing, Progressing  Goal: Optimal Comfort and Wellbeing  Outcome: Ongoing, Progressing  Goal: Readiness for Transition of Care  Outcome: Ongoing, Progressing     Problem: Coping Ineffective  Goal: Effective Coping  Outcome: Ongoing, Progressing     Problem: Adjustment to Illness (Sepsis/Septic Shock)  Goal: Optimal  Coping  Outcome: Ongoing, Progressing     Problem: Bleeding (Sepsis/Septic Shock)  Goal: Absence of Bleeding  Outcome: Ongoing, Progressing     Problem: Glycemic Control Impaired (Sepsis/Septic Shock)  Goal: Blood Glucose Level Within Desired Range  Outcome: Ongoing, Progressing     Problem: Infection Progression (Sepsis/Septic Shock)  Goal: Absence of Infection Signs and Symptoms  Outcome: Ongoing, Progressing     Problem: Nutrition Impaired (Sepsis/Septic Shock)  Goal: Optimal Nutrition Intake  Outcome: Ongoing, Progressing     Problem: Infection  Goal: Absence of Infection Signs and Symptoms  Outcome: Ongoing, Progressing     Problem: Impaired Wound Healing  Goal: Optimal Wound Healing  Outcome: Ongoing, Progressing     Problem: Skin Injury Risk Increased  Goal: Skin Health and Integrity  Outcome: Ongoing, Progressing

## 2022-08-23 NOTE — ASSESSMENT & PLAN NOTE
This patient does have evidence of infective focus  My overall impression is sepsis. Vital signs were reviewed and noted in progress note.  Antibiotics given-   Antibiotics (From admission, onward)            Start     Stop Route Frequency Ordered    08/25/22 0200  ciprofloxacin HCl 0.3 % ophthalmic solution 1 drop         08/30 0159 LEFT EYE Every 4 hours 08/23/22 0640    08/23/22 1200  meropenem (MERREM) 2 g in sodium chloride 0.9% 100 mL IVPB         -- IV Every 8 hours (non-standard times) 08/23/22 0930        Cultures were taken-   Microbiology Results (last 7 days)     Procedure Component Value Units Date/Time    Culture, Respiratory with Gram Stain [[id number]] Collected: 08/23/22 1502    Order Status: Sent Specimen: Respiratory from Tracheal Aspirate Updated: 08/23/22 1610    Urine culture [[id number]] Collected: 08/23/22 1415    Order Status: No result Specimen: Urine Updated: 08/23/22 1458    Culture, Respiratory with Gram Stain [[id number]] Collected: 08/23/22 0314    Order Status: Completed Specimen: Respiratory from Tracheal Aspirate Updated: 08/23/22 0432     Gram Stain (Respiratory) <10 epithelial cells per low power field.     Gram Stain (Respiratory) Rare WBC's     Gram Stain (Respiratory) Rare Gram positive cocci     Gram Stain (Respiratory) Rare yeast     Gram Stain (Respiratory) Moderate Gram negative rods    Blood culture #1 **CANNOT BE ORDERED STAT** [[id number]] Collected: 08/22/22 1951    Order Status: Completed Specimen: Blood from Peripheral, Hand, Left Updated: 08/23/22 0345     Blood Culture, Routine No Growth to date    Blood culture #2 **CANNOT BE ORDERED STAT** [[id number]] Collected: 08/22/22 2010    Order Status: Completed Specimen: Blood from Peripheral, Forearm, Left Updated: 08/23/22 0345     Blood Culture, Routine No Growth to date    Urine culture [[id number]] Collected: 08/22/22 2053    Order Status: No result Specimen: Urine Updated: 08/22/22 2117        Latest lactate reviewed,  they are-  Recent Labs   Lab 08/22/22 2003   LACTATE 0.6       Organ dysfunction indicated by Acute kidney injury  Source- Urine vs. Lung    Source control Achieved by- Antibiotics    Pt with hx of multiple drug resistant organisms including Proteus, Pseudomonas, Klebsiella and Acinetobacter. Py previously on Zerbexa, unclear duration.     --ID consulted   --Continue Meropenem, Vanc for now  --Respiratory, blood, urine cultures pending

## 2022-08-23 NOTE — ASSESSMENT & PLAN NOTE
Patient with Hypoxic Respiratory failure which is Chronic.  she is on home oxygen at On ventilator LPM. Supplemental oxygen was provided and noted- Vent Mode: A/C  Oxygen Concentration (%):  [25-40] 25  Resp Rate Total:  [15 br/min-28 br/min] 17 br/min  Vt Set:  [400 mL] 400 mL  PEEP/CPAP:  [5 cmH20] 5 cmH20  Mean Airway Pressure:  [11 azU88-31 cmH20] 11 cmH20.   Signs/symptoms of respiratory failure include- respiratory distress. Contributing diagnoses includes - Pneumonia Labs and images were reviewed. Patient Has recent ABG, which has been reviewed. Will treat underlying causes and adjust management of respiratory failure as follows-     CXR with coarse interstitial attenuation and bibasilar opacities    -Duonebs q 6 hrs  -VAP protocol in place  -Lung protective ventilation

## 2022-08-23 NOTE — ASSESSMENT & PLAN NOTE
Cholecystomy tube placed at [facility] 01/2022    --IR consulted for drain evaluation  --Monitor drain output  --Site Care

## 2022-08-23 NOTE — ASSESSMENT & PLAN NOTE
Patient with Hypoxic Respiratory failure which is Chronic.  she is on home oxygen at On ventilator LPM. Supplemental oxygen was provided and noted- Oxygen Concentration (%):  [40] 40  Resp Rate Total:  [18 br/min-28 br/min] 20 br/min.   Signs/symptoms of respiratory failure include- respiratory distress. Contributing diagnoses includes - Pneumonia Labs and images were reviewed. Patient Has recent ABG, which has been reviewed. Will treat underlying causes and adjust management of respiratory failure as follows-     -Duonebs q 6 hrs  -VAP protocol in place  -Lung protective ventilation

## 2022-08-23 NOTE — PROGRESS NOTES
[facility] Card[facility] Medical [facility]  Critical Care Medicine  Progress Note    Patient Name: Genna Felix  MRN: [id number]  Admission Date: 8/22/2022  Hospital Length of Stay: 1 days  Code Status: Full Code  Attending Provider: Tonny Aparicio MD  Primary Care Provider: Primary Doctor No   Principal Problem: Sepsis    Subjective:     HPI:  Genna Felix is a 61-year-old  female with a PMHx of asthma, GERD s/p cardiac arrest with anoxic brain injury in 2020, now in a persistent vegetative state with a trach/PEG and vent dependent who is a resident of [facility]. Pt was brought to the ED by EMS for right arm swelling. Upon arrival to the ED she was found to be hyperkalemic to 6.2 and hypothermic concerning for sepsis.     In the ED the patient was given 10 units of IV insulin and D10. UA + for bacteria, +3 Leuks, 44 WBC. Urine culture pending. Of note patient was hospitalized in the beginning of this month and respiratory culture was positible for Klebsiella ESBL, and Acinetobacter . It is unclear if she was treated for these bacteria. Respiratory cultures from 07/30 were positive for multidrug resistant psuedomonas and Proteus ESBL. The patient was treated with a course of Zerbaxa, duration unclear. Pt presented with RUQ cholecystostomy tube that was placed in 01/2022 at [place]. Hamilton dependent.     Critical care medicine was consulted for sepsis, trach w/vent dependency. She will be admitted to MICU. Started on Meropenem/Vanc. ID consulted.           Hospital/ICU Course:  DVT without evidence of clot.  CXR with coarse increased interstitial attenuation and patchy bibasilar infiltrates.  CTA without SBO, stable tube and decompressed gallbladder with cholelithiasis.  Bxl NGTD.  Hyperkalemic again thus shifted.  Given lasix and lokelma.  Repeat K 4.4  UA and Ucx taken before Hamilton exchange.  Hamilton replaced and repeat UA with 3+ leuks and 57 WBC.  ID onboard.  Technically has MEGAN as SCr bumped  from 0.3 to 0.6.  Started tube feeds.  Ongoing GOC with family.      Interval History/Significant Events: NAEO.  Levo increased to 0.22.  GOC discussion ongoing.  Afebrile.    Review of Systems   Unable to perform ROS: Patient nonverbal   Objective:     Vital Signs (Most Recent):  Temp: 96.98 °F (36.1 °C) (08/23/22 1600)  Pulse: 96 (08/23/22 1600)  Resp: 16 (08/23/22 1600)  BP: 111/68 (08/23/22 1600)  SpO2: 96 % (08/23/22 1600) Vital Signs (24h Range):  Temp:  [93.5 °F (34.2 °C)-97.7 °F (36.5 °C)] 96.98 °F (36.1 °C)  Pulse:  [] 96  Resp:  [16-24] 16  SpO2:  [95 %-100 %] 96 %  BP: ()/(54-85) 111/68   Weight: 77.7 kg (171 lb 4.8 oz)  Body mass index is 33.45 kg/m².      Intake/Output Summary (Last 24 hours) at 8/23/2022 1647  Last data filed at 8/23/2022 1600  Gross per 24 hour   Intake 1970.67 ml   Output 1695 ml   Net 275.67 ml       Physical Exam  Vitals and nursing note reviewed.   Constitutional:       Appearance: She is ill-appearing.   HENT:      Head: Atraumatic.      Right Ear: External ear normal.      Left Ear: External ear normal.      Nose: Nose normal. No rhinorrhea.      Mouth/Throat:      Mouth: Mucous membranes are dry.   Eyes:      Comments: L eye cataract, Roaming eye movements, unable to focus or track   Cardiovascular:      Rate and Rhythm: Normal rate and regular rhythm.      Pulses: Normal pulses.      Heart sounds: Normal heart sounds.   Pulmonary:      Comments: Trach/Vent dependent  Abdominal:      General: There is distension.   Genitourinary:     Comments: Hamilton catheter in place  Musculoskeletal:      Right lower leg: No edema.      Left lower leg: No edema.      Comments: Withdrawal to pain, upper extremity contracture  RUE swelling   Skin:     General: Skin is warm and dry.      Coloration: Skin is pale.   Neurological:      Mental Status: Mental status is at baseline.       Vents:  Vent Mode: A/C (08/23/22 1502)  Ventilator Initiated: Yes (08/22/22 2034)  Set Rate: 14 BPM  (08/23/22 1502)  Vt Set: 400 mL (08/23/22 1502)  PEEP/CPAP: 5 cmH20 (08/23/22 1502)  Oxygen Concentration (%): 25 (08/23/22 1600)  Peak Airway Pressure: 25 cmH2O (08/23/22 1502)  Plateau Pressure: 29 cmH20 (08/23/22 1502)  Total Ve: 7.04 mL (08/23/22 1502)  Negative Inspiratory Force (cm H2O): 0 (08/23/22 1502)  F/VT Ratio<105 (RSBI): (!) 37.95 (08/23/22 1502)  Lines/Drains/Airways       Drain  Duration                  Biliary Tube RUQ -- days         Gastrostomy/Enterostomy LUQ -- days         Biliary Tube 01/13/22 222 days         Urethral Catheter 08/23/22 1445 Silicone 18 Fr. <1 day              Airway  Duration                  Surgical Airway Portex Cuffed -- days              Peripheral Intravenous Line  Duration                  Midline Catheter Insertion/Assessment  - Single Lumen 08/19/22 Left brachial vein 4 days         Peripheral IV - Single Lumen 08/22/22 1957 20 G Left Hand <1 day         Peripheral IV - Single Lumen 08/22/22 2015 22 G Left Forearm <1 day                  Significant Labs:    CBC/Anemia Profile:  Recent Labs   Lab 08/22/22 2003 08/22/22 2019 08/23/22  0504   WBC 7.05  --  9.25   HGB 10.8*  --  10.0*   HCT 34.6* 34* 32.4*     --  346   MCV 84  --  86   RDW 18.6*  --  18.2*        Chemistries:  Recent Labs   Lab 08/22/22 2003 08/22/22  2217 08/23/22  0504 08/23/22  1212   * 130* 130* 130*   K 6.1* 4.9 6.2* 4.4    105 100 100   CO2 21* 19* 21* 21*   BUN 38* 36* 38* 35*   CREATININE 0.5 0.6 0.6 0.6   CALCIUM 10.1 10.1 9.6 10.5   ALBUMIN 3.2*  --  3.2*  --    PROT 9.8*  --  8.8*  --    BILITOT 1.1*  --  1.6*  --    ALKPHOS 201*  --  193*  --    ALT 42  --  48*  --    AST 44*  --  51*  --    MG 2.1  --  2.0  --    PHOS  --   --  3.7  --        Urine Culture: No results for input(s): LABURIN in the last 48 hours.  Urine Studies:   Recent Labs   Lab 08/23/22  1415   COLORU Yellow   APPEARANCEUA Hazy*   PHUR 5.0   SPECGRAV 1.015   PROTEINUA 1+*   GLUCUA Negative    KETONESU Trace*   BILIRUBINUA Negative   OCCULTUA 1+*   NITRITE Negative   LEUKOCYTESUR 3+*   RBCUA 14*   WBCUA 57*   BACTERIA Few*   SQUAMEPITHEL 1   HYALINECASTS 0     All pertinent labs within the past 24 hours have been reviewed.    Significant Imaging:  I have reviewed all pertinent imaging results/findings within the past 24 hours.      ABG  Recent Labs   Lab 08/22/22 2018   PH 7.389   PO2 101*   PCO2 37.6   HCO3 22.7*   BE -2     Assessment/Plan:     Neuro  Persistent vegetative state  --Pt remains in persistent vegetative state 2/2 PEA and anoxic brain injury in 2020, withdraws to painful stimuli    ENT  Tracheostomy dependence  --Trach care per protocol    Pulmonary  Ventilator dependence  -See chronic hypoxic respiratory failure    Chronic respiratory failure with hypoxia  Patient with Hypoxic Respiratory failure which is Chronic.  she is on home oxygen at On ventilator LPM. Supplemental oxygen was provided and noted- Vent Mode: A/C  Oxygen Concentration (%):  [25-40] 25  Resp Rate Total:  [15 br/min-28 br/min] 17 br/min  Vt Set:  [400 mL] 400 mL  PEEP/CPAP:  [5 cmH20] 5 cmH20  Mean Airway Pressure:  [11 paQ68-01 cmH20] 11 cmH20.   Signs/symptoms of respiratory failure include- respiratory distress. Contributing diagnoses includes - Pneumonia Labs and images were reviewed. Patient Has recent ABG, which has been reviewed. Will treat underlying causes and adjust management of respiratory failure as follows-     CXR with coarse interstitial attenuation and bibasilar opacities    -Duonebs q 6 hrs  -VAP protocol in place  -Lung protective ventilation    ID  * Sepsis  This patient does have evidence of infective focus  My overall impression is sepsis. Vital signs were reviewed and noted in progress note.  Antibiotics given-   Antibiotics (From admission, onward)            Start     Stop Route Frequency Ordered    08/25/22 0200  ciprofloxacin HCl 0.3 % ophthalmic solution 1 drop         08/30 0159 LEFT EYE  Every 4 hours 08/23/22 0640    08/23/22 1200  meropenem (MERREM) 2 g in sodium chloride 0.9% 100 mL IVPB         -- IV Every 8 hours (non-standard times) 08/23/22 0930        Cultures were taken-   Microbiology Results (last 7 days)     Procedure Component Value Units Date/Time    Culture, Respiratory with Gram Stain [[id number]] Collected: 08/23/22 1502    Order Status: Sent Specimen: Respiratory from Tracheal Aspirate Updated: 08/23/22 1610    Urine culture [[id number]] Collected: 08/23/22 1415    Order Status: No result Specimen: Urine Updated: 08/23/22 1458    Culture, Respiratory with Gram Stain [[id number]] Collected: 08/23/22 0314    Order Status: Completed Specimen: Respiratory from Tracheal Aspirate Updated: 08/23/22 0432     Gram Stain (Respiratory) <10 epithelial cells per low power field.     Gram Stain (Respiratory) Rare WBC's     Gram Stain (Respiratory) Rare Gram positive cocci     Gram Stain (Respiratory) Rare yeast     Gram Stain (Respiratory) Moderate Gram negative rods    Blood culture #1 **CANNOT BE ORDERED STAT** [[id number]] Collected: 08/22/22 1951    Order Status: Completed Specimen: Blood from Peripheral, Hand, Left Updated: 08/23/22 0345     Blood Culture, Routine No Growth to date    Blood culture #2 **CANNOT BE ORDERED STAT** [[id number]] Collected: 08/22/22 2010    Order Status: Completed Specimen: Blood from Peripheral, Forearm, Left Updated: 08/23/22 0345     Blood Culture, Routine No Growth to date    Urine culture [[id number]] Collected: 08/22/22 2053    Order Status: No result Specimen: Urine Updated: 08/22/22 2117        Latest lactate reviewed, they are-  Recent Labs   Lab 08/22/22 2003   LACTATE 0.6       Organ dysfunction indicated by Acute kidney injury  Source- Urine vs. Lung    Source control Achieved by- Antibiotics    Pt with hx of multiple drug resistant organisms including Proteus, Pseudomonas, Klebsiella and Acinetobacter. Py previously on Zerbexa, unclear duration.     --ID  consulted   --Continue Meropenem, Vanc for now  --Respiratory, blood, urine cultures pending        Oncology  Anemia of chronic disease  Baseline around 9.    --Daily CBC    Endocrine  On tube feeding diet  See PEG    GI  Cholecystostomy care  [facility]olecyst[facility] tube [facility] at [facility] 01/[facility]22    --Monitor drain output  --Site Care    PEG (percutaneous endoscopic gastrostomy) status  CT abdo shows stable PEG tube    --PEG care per protocol  --PEG previously to suction, no SBO thus suction off  --Continue tube feeds    Orthopedic  Swelling of arm  Right arm swelling that hs been progressing according to nursing home    --Elevate RUE  --US negative for DVT  --Lovenox for DVT prophylaxis    Wounds, multiple  --Wound care consulted  --Pressure reduction strategies, Turn q2         Critical Care Daily Checklist:     A: Awake: RASS Goal/Actual Goal:    Actual:     B: Spontaneous Breathing Trial Performed?    C: SAT & SBT Coordinated?  N/A                      D: Delirium: CAM-ICU    E: Early Mobility Performed? No   F: Feeding Goal:    Status:         Current Diet Order   Procedures    Tube feeds       AS: Analgesia/Sedation None   T: Thromboembolic Prophylaxis Lovenox   H: HOB > 300 Yes   U: Stress Ulcer Prophylaxis (if needed) Famotidine   G: Glucose Control 140-180   B: Bowel Function    I: Indwelling Catheter (Lines & Hamilton) Necessity Hamilton, [place] tube, Trach   D: De-escalation of Antimicrobials/Pharmacotherapies ID consult     Plan for the day/ETD F/U cultures     Code Status:  Family/Goals of Care: Full Code  Ongoing        Critical secondary to Patient has a condition that poses threat to life and bodily function: Sepsis, Chronic hypoxic respiratory failure, vent dependent       Critical care was time spent personally by me on the following activities: development of treatment plan with patient or surrogate and bedside caregivers, discussions with consultants, evaluation of patient's response to treatment,  examination of patient, ordering and performing treatments and interventions, ordering and review of laboratory studies, ordering and review of radiographic studies, pulse oximetry, re-evaluation of patient's condition. This critical care time did not overlap with that of any other provider or involve time for any procedures.     Joselo Jimenez MD  Critical Care Medicine  [facility] - Cardiac Medical [facility]

## 2022-08-23 NOTE — ASSESSMENT & PLAN NOTE
--Pt remains in persistent vegetative state 2/2 PEA and anoxic brain injury in 2020, withdraws to painful stimuli

## 2022-08-23 NOTE — ASSESSMENT & PLAN NOTE
"61 year old female with anoxic brain injury who is vent dependent in a nursing home admitted with concerns for sepsis and MEGAN. Palliative consulted for GOC. Has been seen by palliative medicine team at the [facility]     Code status: Full. Of note patient was DNR during 1/22 hospitalization     Surrogate decision maker: Legal NOK is patient's two children. Per daughter Starr, patient's son is in agreement with whatever she decides     GOC/ACP  -Met with daughter Starr at bedside along with palliative LCSW Dora Patino. Re-introduced palliative medicine. Discussed patient's journey since cardiac arrest leading to anoxic brain injury. Starr says that she is hopeful that her mother will "come out of it" but also says that she doesn't think her mother with ever be the same. Describes her mother as very independent. She shared with us that she and her mother have a very strong bond. She wonders if things would have turned out differently if her mother would have sought medical treatment when she first started feeling bad. She believes that her mother would want to be kept alive on life support indefinitely until "God is ready for her."     -Goals continue to be life prolonging. Daughter believes patient would want to be kept alive on life support indefinitely.   "

## 2022-08-23 NOTE — HPI
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 Pt was brought to the ED by EMS for right arm swelling. Upon arrival to the ED she was found to be hyperkalemic to 6.2 and hypothermic concerning for sepsis."     Patient is being treated with broad spectrum abx. Cultures are pending. She is being treated for MEGAN. Palliative is consulted for GO. Patient has had recurrent hospitalizations. Patient has been seen multiple times by the palliative medicine team at [facility]. Patient does not follow commands. Unable to provide any history or participate in GOC conversation. Daughter Starr is at bedside. Patient is Full code. Of note patient was DNR during 1/2022 hospitalization  "

## 2022-08-23 NOTE — PLAN OF CARE
"Advance Care Planning   [facility] - Cardiac Medical ICU  Palliative Care   Psychosocial Assessment    Patient Name: Genna Felix  MRN: [id number]  Admission Date: 8/22/2022  Hospital Length of Stay: 1 days  Code Status: Full Code   Attending Provider: Tonny Aparicio MD  Palliative Care Provider: Dr. Castellano  Primary Care Physician: Primary Doctor No  Principal Problem:Sepsis    Reason for Referral: assistance with clarification of goals of care  Consult Order Date: 8/23/22  Primary CM/SW: Christy Bailey LMSW    Present during Interview: pt's daughter Starr.      Primary Language:English   Needed: no      Past Medical Situation:   PMH:   Past Medical History:   Diagnosis Date    Anoxic brain damage 07/2020    Bedbound 07/2020    Cardiac arrest 07/2020    Cirrhosis     GERD (gastroesophageal reflux disease)     Hemangioma of intra-abdominal structure     Hypertension     Nursing home resident     Protein calorie malnutrition     Pulmonary embolus     Respiratory distress     S/P percutaneous endoscopic gastrostomy (PEG) tube placement 07/2020    Total self-care deficit 07/2020    Tracheostomy dependence     7/2020     Mental Health/Substance Use History:  None noted  Risk of Abuse, neglect or exploitation: none noted  Current or Previous Trauma and/or evidence of PTSD: none noted  Non-traditional Health practices:     Understanding of diagnosis and prognosis: Will benefit from continued follow up regarding px. Daughter appears to understand that she will not "come back" to where she was before but is still hopeful. Daughter stated that she is okay with pt's quality of life   Experience/Comfort level with health care system:    Patients Mental Status: not oriented. Trach/peg vent. Opens eyes and looks around but is not purposeful.     Socio-Economic Factors/Resources:  Address: [address] [place] [address]  [place] [address]  Phone Number: [phone] (home)     Marital " "Status:   Household composition: Lives at [facility] since 2021  Children: 2 children: Starr and Joaquín    Patient/Family perceptions about Caregiving Needs; availability and capacity: return to nursing home    Family Structure, Dynamics/Relationships: Daughter described pt as "She was my glue". Daughter stated that she and pt were very close and she would go to her for everything.     Pt has 2 sisters and 3 brothers. Pt's sisters are supportive of pt's daughter.     Daughter stated that her brother relies on her for most of decisions.    Pt has 2 grandchildren.     Patterns/Styles of Communication and Decision-making in the Family: daughter and sister supportive    Patient/Family Strengths/Resilience: strong leta, family supportive  Patient/Family Coping Style: relies on leta    Activities of Daily Living:total care  Support Systems-Family & Community (Home Health, HME etc): Current nursing home- [facility]. Has also lived at [facility]    Transportation:  ambulance    Work/Education History: Pt is disabled. Pt worked as the  at [employer]  Self-Care Activities/Hobbies: enjoys listening to music on 90.5. enjoyed taking the bus and going shopping     History: no    Financial Resources:Medicare      Advanced Care Planning & Legal Concerns:   Advanced Directives/Living Will: no  LaPOST/POLST: no   Planning:  no    Medical Power of : no     Oral/Written Declaration: no  Witnesses:   Surrogate Decision Maker: 2 children    Emergency Contacts:  Daughter: Starr Felix: [phone]  Sister: Amelia Hidalgo: [phone]    Spirituality, Culture & Coping Mechanisms:  F- Leta and Belief: Orthodoxy     I - Importance: strong ltea. Per Dr. SHERWIN Hogan, Pal Care note from , " Amelia spoke for majority of the conversation, and stated that her Restorationist beliefs are that God wants to keep patient alive (given that she has survived until this point), and " "that medicines were invented for this reason."    C - Community/Culture Values:     A - Address in Care: Will benefit from follow up. Open.      Goals/Hopes/Expectations: Hoping for a miracle.   Fears/Anxiety/Concerns: appears to be feeling gulty for not pushing her to go to hospital when pt said she didn't feel well prior to what daughter says was a stroke.         Preferences about EOL Environment: (own bed, family nearby, pets, music, etc)  tbd    Complicated Bereavement Risk Assessment Tool (CBRAT)  Reference:  [facility] Clinical Practice Group (May 2016). Bereavement Risk Screening and Management Guidelines.  Retrieved from: http://www.[employer].com.au/wp-content/uploads//FBKSO-Isqidujviih-Ogtqhplru-and-Management-Guideline-2016.pdf      Bereaved Client Characteristics   ? Under 18      no  ? Was a Twin   no  ? Young Spouse   no  ? Elderly Spouse    no  ? Isolated    no  ? Lacks Meaningful Social Support   no  ? Dissatisfied with help available during illness   no  ? New to Financial [place] no  ? New to Decision-Making   no    Illness  ? Inherited Disorder   no  ? Stigmatized Disease in the family/community   no  ? Lengthy/Burdensome   no     Bereaved Client's History of Loss   ? Cumulative Multiple Losses   no  ? Previous Mental Health Illnesses   no  ? Current Mental Health Illness   no  ? Other Significant Health Issues   no   ? Migrant/Refugee   no Will the Death be:  ? Sudden or Unexpected   yes  ? Traumatic Circumstances Associated with Death   no  ? Significant Cultural/Social Burdens as a result of Death   no   Relationship with   ? Profound Lifelong Partner   no  ? Highly Dependent    no  ? Antagonistic   no  ? Ambivalent   no  ? Deeply Connected   yes  ? Culturally Defined   no   Risk Factors Scores  0-2  Low  3-5  Moderate  5+  High  All persons scoring moderate to high presume to be at risk**    (** It is acknowledged that protective " "factors and resilience may outweigh apparent risk factors.      Total Risk Factors Score: Mild to Moderate      Will benefit from continued follow up. Daughter at increased risk as it appears she has guilt for not pushing pt into going to hospital before pt's cardiac arrest.       Discharge Planning Needs/Plan of Care:     Visit to bedside with Pal Care MD. Pt's daughter Starr present. Began establishing rapport with pt's daughter Starr.    Starr stated that she feels that pt recognizes voices at times but she "don't think she is gonna come back." Daughter stated that the stroke was "outta the blue".     Explained palliative care roles and that we would be supportive of pt and family. Provide support.    Dora Patino, ELIW, ACHP-SW                 "

## 2022-08-23 NOTE — ASSESSMENT & PLAN NOTE
CT abdo shows stable PEG tube    --PEG care per protocol  --PEG previously to suction, no SBO thus suction off  --Continue tube feeds

## 2022-08-23 NOTE — CONSULTS
"[facility] - Cardiac Medical [facility]  Palliative Medicine  Consult Note    Patient Name: Genna Felix  MRN: [id number]  Admission Date: 8/22/2022  Hospital Length of Stay: 1 days  Code Status: Full Code   Attending Provider: Tonny Aparicio MD  Consulting Provider: Lisa Castellano MD  Primary Care Physician: Primary Doctor No  Principal Problem:Sepsis    Patient information was obtained from relative(s) and primary team.      Inpatient consult to Palliative Care  Consult performed by: Lisa Castellano MD  Consult ordered by: Mabel Linda DNP        Assessment/Plan:     Palliative care encounter  61 year old female with anoxic brain injury who is vent dependent in a nursing home admitted with concerns for sepsis and MEGAN. Palliative consulted for GOC. Has been seen by palliative medicine team at the [facility]     Code status: Full. Of note patient was DNR during 1/22 hospitalization     Surrogate decision maker: Legal NOK is patient's two children. Per daughter Starr, patient's son is in agreement with whatever she decides     GOC/ACP  -Met with nino Clements at bedside along with palliative LCSW Dora Patino. Re-introduced palliative medicine. Discussed patient's journey since cardiac arrest leading to anoxic brain injury. Starr says that she is hopeful that her mother will "come out of it" but also says that she doesn't think her mother with ever be the same. Describes her mother as very independent. She shared with us that she and her mother have a very strong bond. She wonders if things would have turned out differently if her mother would have sought medical treatment when she first started feeling bad. She believes that her mother would want to be kept alive on life support indefinitely until "God is ready for her."     -Goals continue to be life prolonging. Daughter believes patient would want to be kept alive on life support indefinitely.         Thank you for your consult. I will " "follow-up with patient. Please contact us if you have any additional questions.    Subjective:     HPI:   Per critical care H&P  "61-year-old  female with a PMHx of asthma, GERD s/p cardiac arrest with anoxic brain injury in 2020, now in a persistent vegetative state with a trach/PEG and vent dependent who is a resident of [facility]. Pt was brought to the ED by EMS for right arm swelling. Upon arrival to the ED she was found to be hyperkalemic to 6.2 and hypothermic concerning for sepsis."     Patient is being treated with broad spectrum abx. Cultures are pending. She is being treated for MEGAN. Palliative is consulted for [facility]. Patient has had recurrent hospitalizations. Patient has been seen multiple times by the palliative medicine team at [facility]. Patient does not follow commands. Unable to provide any history or participate in GOC conversation. Daughter Starr is at bedside. Patient is Full code. Of note patient was DNR during 1/2022 hospitalization      Hospital Course:  No notes on file        Past Medical History:   Diagnosis Date    Anoxic brain damage 07/2020    Bedbound 07/2020    Cardiac arrest 07/2020    Cirrhosis     GERD (gastroesophageal reflux disease)     Hemangioma of intra-abdominal structure     Hypertension     Nursing home resident     Protein calorie malnutrition     Pulmonary embolus     Respiratory distress     S/P percutaneous endoscopic gastrostomy (PEG) tube placement 07/2020    Total self-care deficit 07/2020    Tracheostomy dependence     7/2020       Past Surgical History:   Procedure Laterality Date    PEG W/TRACHEOSTOMY PLACEMENT  07/27/2020    SKIN GRAFT      buttocks       Review of patient's allergies indicates:  No Known Allergies    Medications:  Continuous Infusions:   sodium chloride 0.9% 5 mL/hr at 08/23/22 1200     Scheduled Meds:   artificial tears  1 drop Both Eyes TID    [START ON 8/25/2022] ciprofloxacin HCl  1 drop Left Eye " Q4H    enoxaparin  40 mg Subcutaneous Daily    famotidine  20 mg Per G Tube BID    meropenem (MERREM) IVPB  2 g Intravenous Q8H    polyethylene glycol  17 g Oral Daily    sennosides 8.8 mg/5 ml  5 mL Oral QHS     PRN Meds:acetaminophen, [COMPLETED] calcium gluconate IVPB **AND** calcium gluconate IVPB, dextrose 10%, dextrose 10%, sodium chloride 0.9%    Family History       Problem Relation (Age of Onset)    No Known Problems Mother, Father          Tobacco Use    Smoking status: Never Smoker    Smokeless tobacco: Never Used   Substance and Sexual Activity    Alcohol use: Not Currently    Drug use: Not Currently    Sexual activity: Not Currently       Review of Systems   Unable to perform ROS: Patient nonverbal   Objective:     Vital Signs (Most Recent):  Temp: 97.16 °F (36.2 °C) (08/23/22 1116)  Pulse: (!) 115 (08/23/22 1116)  Resp: 18 (08/23/22 1116)  BP: 107/66 (08/23/22 1116)  SpO2: 96 % (08/23/22 1116)   Vital Signs (24h Range):  Temp:  [93.5 °F (34.2 °C)-97.16 °F (36.2 °C)] 97.16 °F (36.2 °C)  Pulse:  [] 115  Resp:  [18-24] 18  SpO2:  [95 %-100 %] 96 %  BP: ()/(54-85) 107/66     Weight: 77.7 kg (171 lb 4.8 oz)  Body mass index is 33.45 kg/m².    Physical Exam  Vitals and nursing note reviewed.   Constitutional:       General: She is not in acute distress.     Appearance: She is ill-appearing.      Comments: awake   HENT:      Head: Normocephalic.      Right Ear: External ear normal.      Left Ear: External ear normal.      Nose: Nose normal.      Mouth/Throat:      Comments: Trach in place  Eyes:      Comments: Does not track   Cardiovascular:      Rate and Rhythm: Tachycardia present.   Pulmonary:      Effort: No respiratory distress.      Comments: On vent  Abdominal:      General: There is no distension.   Musculoskeletal:      Cervical back: Normal range of motion.      Comments: Moves extremities spontaneously   Neurological:      Comments: Does not follow commands, does not interact        Review of Symptoms      Symptom Assessment (ESAS 0-10 Scale)  Unable to complete assessment due to Patient nonverbal     CAM / Delirium:  Negative  Constipation:  Negative  Diarrhea:  Negative      Bowel Management Plan (BMP):  Yes      Pain Assessment in Advanced Demential Scale (PAINAD)   Breathing - Independent of vocalization:  0  Negative vocalization:  0  Facial expression:  0  Body language:  0  Consolability:  0  Total:  0    Modified Mahendra Scale:  0    Performance Status:  10    Living Arrangements:  Lives in nursing home    Psychosocial/Cultural: See palliative LCSW's psychosocial     Spiritual:  F - Leta and Belief:  Yes  I - Importance:  Yes  C - Community:  Yes  A - Address in Care:  Yes    Advance Care Planning   Advance Directives:   Living Will: No    LaPOST: Yes    Do Not Resuscitate Status: No    Medical Power of : No      Decision Making:  Family answered questions       Significant Labs: All pertinent labs within the past 24 hours have been reviewed.  CBC:   Recent Labs   Lab 08/23/22  0504   WBC 9.25   HGB 10.0*   HCT 32.4*   MCV 86        BMP:  Recent Labs   Lab 08/23/22  0504 08/23/22  1212   GLU 71 55*   * 130*   K 6.2* 4.4    100   CO2 21* 21*   BUN 38* 35*   CREATININE 0.6 0.6   CALCIUM 9.6 10.5   MG 2.0  --      LFT:  Lab Results   Component Value Date    AST 51 (H) 08/23/2022    ALKPHOS 193 (H) 08/23/2022    BILITOT 1.6 (H) 08/23/2022     Albumin:   Albumin   Date Value Ref Range Status   08/23/2022 3.2 (L) 3.5 - 5.2 g/dL Final     Protein:   Total Protein   Date Value Ref Range Status   08/23/2022 8.8 (H) 6.0 - 8.4 g/dL Final     Lactic acid:   Lab Results   Component Value Date    LACTATE 0.6 08/22/2022    LACTATE 0.7 08/10/2022       Significant Imaging: I have reviewed all pertinent imaging results/findings within the past 24 hours.    CT abdomen/pelvis 8/22/22  Impression:     No evidence of bowel obstruction or inflammation.     Cholecystostomy tube  in stable position with decompressed gallbladder and cholelithiasis.     Persistent subsegmental opacities in the bilateral lung bases, right side greater than left.     Stable partially visualized soft tissue nodules in the right lateral chest wall when compared with multiple prior studies.          > 50% of 75 min visit spent in chart review, face to face discussion of goals of care,  symptom assessment, coordination of care and emotional support.    Lisa Castellano MD  Palliative Medicine  [facility] - Cardiac Medical ICU

## 2022-08-23 NOTE — ASSESSMENT & PLAN NOTE
Cholecystomy tube placed at [facility] 01/2022    --Monitor drain output  --Site Care

## 2022-08-23 NOTE — CONSULTS
Nutrition consult received regarding TF recommendations.  RD following, please see note from 8/23 for full assessment.    Recommendations  1. When medically feasible, initiate TFs. Rec'd Isosource 1.5 @ 40 mL/hr to provide 1440 kcals, 65 g of protein, 733 mL fluid.   2. RD to monitor & follow-up.     Goals: Meet % EEN, EPN by RD f/u date  Nutrition Goal Status: new  Communication of RD Recs: reviewed with RN    Thanks!  MS Joy, RD, LDN

## 2022-08-23 NOTE — ASSESSMENT & PLAN NOTE
Hypothermic and with tachycardia on admission with concern for sepsis as underlying etiology.  Potential sources of infection include the respiratory tract, intra-abdominal, urinary tract, and skin and soft tissue infection.  She does not have she does not have leukocytosis laboratory workup.  Chest x-ray shows bibasilar patchy opacities and CT of the abdomen shows persistent subsegmental opacities at the bilateral lung bases.  Changes have been present since at least 08/10/2022.  On on examination a right arm up to the bone shows changes concerning for a skin soft tissue infection.  Ultrasound to rule out DVT showed a small complex fluid collection in the soft tissues of the inner arm measuring 1.4 x 1 x 0.8 cm.  · Agree with meropenem and vancomycin for now.  · Pharmacy consult to manage vancomycin levels and therapeutic drug dosing.  · Will follow-up pending cultures.  · Would consider repeat urinalysis with reflex to culture after Hamilton catheter tubing is exchanged.  · Full note to follow.

## 2022-08-23 NOTE — CONSULTS
[facility]  Infectious Disease  Consult Note    Patient Name: Genna Felix  MRN: [id number]  Admission Date: 8/22/2022  Hospital Length of Stay: 1 days  Attending Physician: Tonny Aparicio MD  Primary Care Provider: Primary Doctor No     Isolation Status: Contact    Patient information was obtained from past medical records and ER records.      Inpatient consult to Infectious Diseases  Consult performed by: Stephie Mendiola MD  Consult ordered by: Mabel Linda DNP        Assessment/Plan:     * Sepsis  Hypothermic and with tachycardia on admission with concern for sepsis as underlying etiology.  Potential sources of infection include the respiratory tract, intra-abdominal, urinary tract, and skin and soft tissue infection.  She does not have she does not have leukocytosis laboratory workup.  Chest x-ray shows bibasilar patchy opacities and CT of the abdomen shows persistent subsegmental opacities at the bilateral lung bases.  Changes have been present since at least 08/10/2022.  On on examination a right arm up to the bone shows changes concerning for a skin soft tissue infection.  Ultrasound to rule out DVT showed a small complex fluid collection in the soft tissues of the inner arm measuring 1.4 x 1 x 0.8 cm.  · Agree with meropenem and vancomycin for now.  · Pharmacy consult to manage vancomycin levels and therapeutic drug dosing.  · Will follow-up pending cultures.  · Would consider repeat urinalysis with reflex to culture after Hamilton catheter tubing is exchanged.      Swelling of arm  Concern for cellulitis due to skin excoriations. Complex fluid collection seen on imaging.   · Management as above.       Thank you for your consult. I will follow-up with patient. Please contact us if you have any additional questions.    Stephie Mendiola MD  Infectious Disease  [facility]    Subjective:     Principal Problem: Sepsis    HPI: A 61-year-old woman with  "anoxic brain injury in 2020, a persistent vegetative state, ventilator dependent with tracheostomy plus PEG, acute cholecystitis status post IR drainage in January, and recent inpatient admission at [facility] for possible aspiration pneumonia with MDRO organisms who was brought in to [facility] for evaluation from her nursing home due to right arm swelling.  On evaluation she was noted to be hypothermic and laboratory workup revealed hyperkalemia.  Urinalysis revealed pyuria however this was performed from an old Hamilton catheter.  She was admitted to the ICU for management of sepsis suspected to be from pneumonia versus UTI.  She was started on empiric meropenem and vancomycin.    Of note, the patient was treated with Zerbaxa and transition to Cipro on discharge.    Infectious DIseases consulted for "Started Meropenem due to hx of Klebsiella ESBL and Acinetobacter"                Past Medical History:   Diagnosis Date    Anoxic brain damage 07/2020    Bedbound 07/2020    Cardiac arrest 07/2020    Cirrhosis     GERD (gastroesophageal reflux disease)     Hemangioma of intra-abdominal structure     Hypertension     Nursing home resident     Protein calorie malnutrition     Pulmonary embolus     Respiratory distress     S/P percutaneous endoscopic gastrostomy (PEG) tube placement 07/2020    Total self-care deficit 07/2020    Tracheostomy dependence     7/2020       Past Surgical History:   Procedure Laterality Date    PEG W/TRACHEOSTOMY PLACEMENT  07/27/2020    SKIN GRAFT      buttocks       Review of patient's allergies indicates:  No Known Allergies    Medications:  Medications Prior to Admission   Medication Sig    acetylcysteine 100 mg/ml, 10%, (MUCOMYST) 100 mg/mL (10 %) nebulizer solution Take 4 mLs by nebulization every 8 (eight) hours.    albuterol-ipratropium (DUO-NEB) 2.5 mg-0.5 mg/3 mL nebulizer solution Take 3 mLs by nebulization every 6 (six) hours. Rescue    chlorhexidine " "(PERIDEX) 0.12 % solution Use as directed 15 mLs in the mouth or throat 2 (two) times daily.    clonazePAM (KLONOPIN) 0.5 MG tablet Take 0.5 mg by mouth 2 (two) times daily.    ergocalciferol (ERGOCALCIFEROL) 50,000 unit Cap Take 50,000 Units by mouth every 7 days.    midodrine (PROAMATINE) 10 MG tablet 1 tablet (10 mg total) by Per G Tube route 3 (three) times daily.    oxybutynin (DITROPAN) 5 MG Tab Take 5 mg by mouth 2 (two) times daily.    0.9 % sodium chloride (SODIUM CHLORIDE 0.9 %) PgBk Inject into the vein.    acetaminophen (TYLENOL) 325 MG tablet 2 tablets (650 mg total) by Per G Tube route every 4 (four) hours as needed for Pain or Temperature greater than (100.4F).    arginine/glutamine/calcium bmb (XIOMARA ORAL) 1 Package by PEG Tube route 2 (two) times a day. In 8oz of water    artificial tears (ISOPTO TEARS) 0.5 % ophthalmic solution Place 1 drop into both eyes as needed.    ferrous sulfate 300 mg (60 mg iron)/5 mL syrup Take 300 mg by mouth As instructed. Every other day    lactose-reduced food (ISOSOURCE ORAL) 1.5 per peg tube at 50ml/hr continuously    polyethylene glycol (GLYCOLAX) 17 gram PwPk Take by mouth daily as needed.    tobramycin (NEBCIN) 40 mg/mL injection     whey protein isolate (BENEPROTEIN) 6 gram-25 kcal/7 gram Powd 7 g by PEG Tube route 2 (two) times a day. In 2oz of water     Antibiotics (From admission, onward)                Start     Stop Route Frequency Ordered    08/25/22 0200  ciprofloxacin HCl 0.3 % ophthalmic solution 1 drop         08/30 0159 LEFT EYE Every 4 hours 08/23/22 0640    08/23/22 2259  vancomycin - pharmacy to dose  (vancomycin IVPB)        "And" Linked Group Details    -- IV pharmacy to manage frequency 08/23/22 2200    08/23/22 1200  meropenem (MERREM) 2 g in sodium chloride 0.9% 100 mL IVPB         -- IV Every 8 hours (non-standard times) 08/23/22 0930          Antifungals (From admission, onward)                None          Antivirals (From " admission, onward)      None             Immunization History   Administered Date(s) Administered    Influenza - Quadrivalent - PF *Preferred* (6 months and older) 10/22/2021, 03/25/2022    Pneumococcal Conjugate - 13 Valent 03/25/2022       Family History       Problem Relation (Age of Onset)    No Known Problems Mother, Father          Social History     Socioeconomic History    Marital status:    Tobacco Use    Smoking status: Never Smoker    Smokeless tobacco: Never Used   Substance and Sexual Activity    Alcohol use: Not Currently    Drug use: Not Currently    Sexual activity: Not Currently     Review of Systems   Unable to perform ROS: Patient nonverbal   Objective:     Vital Signs (Most Recent):  Temp: 96.62 °F (35.9 °C) (08/23/22 2123)  Pulse: 91 (08/23/22 2123)  Resp: 14 (08/23/22 2123)  BP: 108/81 (08/23/22 2100)  SpO2: 100 % (08/23/22 2123) Vital Signs (24h Range):  Temp:  [94 °F (34.4 °C)-97.7 °F (36.5 °C)] 96.62 °F (35.9 °C)  Pulse:  [] 91  Resp:  [14-24] 14  SpO2:  [94 %-100 %] 100 %  BP: (100-131)/(54-82) 108/81     Weight: 77.7 kg (171 lb 4.8 oz)  Body mass index is 33.45 kg/m².    Estimated Creatinine Clearance: 93.1 mL/min (based on SCr of 0.6 mg/dL).    Physical Exam  Vitals and nursing note reviewed.   Constitutional:       General: She is awake.      Appearance: She is well-developed. She is ill-appearing.   HENT:      Head: Normocephalic and atraumatic.      Right Ear: External ear normal.      Left Ear: External ear normal.   Eyes:      General: No scleral icterus.        Right eye: No discharge.         Left eye: No discharge.      Conjunctiva/sclera: Conjunctivae normal.   Cardiovascular:      Rate and Rhythm: Normal rate and regular rhythm.      Heart sounds: Normal heart sounds. No murmur heard.    No friction rub. No gallop.   Pulmonary:      Effort: Pulmonary effort is normal. No respiratory distress.      Breath sounds: No stridor. Examination of the right-lower  field reveals decreased breath sounds. Examination of the left-lower field reveals decreased breath sounds. Decreased breath sounds present. No wheezing or rales.   Abdominal:      General: Bowel sounds are normal.      Comments: PEG in place   Musculoskeletal:         General: No tenderness.      Comments: RUE swelling with skin eschar. Ankylosed extremities.    Skin:     General: Skin is warm and dry.   Neurological:      Comments: Non verbal. Does not follow commands        Significant Labs: Blood Culture:   Recent Labs   Lab 07/29/22  0345 08/10/22  1700 08/10/22  1719 08/22/22  1951 08/22/22 2010   LABBLOO No growth after 5 days. No growth after 5 days. No growth after 5 days. No Growth to date  No Growth to date Gram stain aer bottle: Gram positive cocci in clusters resembling Staph  Results called to and read back by: Samantha Barroso RN. 08/23/2022    21:35     BMP:   Recent Labs   Lab 08/23/22  0504 08/23/22  1212 08/23/22 2002   GLU 71   < > 97   *   < > 133*   K 6.2*   < > 4.9      < > 103   CO2 21*   < > 24   BUN 38*   < > 30*   CREATININE 0.6   < > 0.6   CALCIUM 9.6   < > 10.1   MG 2.0  --   --     < > = values in this interval not displayed.     CBC:   Recent Labs   Lab 08/22/22 2003 08/22/22 2019 08/23/22  0504   WBC 7.05  --  9.25   HGB 10.8*  --  10.0*   HCT 34.6* 34* 32.4*     --  346     Respiratory Culture:   Recent Labs   Lab 03/17/22  1337 07/29/22  0306 08/10/22  1728 08/23/22  0314 08/23/22  1502   GSRESP Rare WBC's  Many Gram positive cocci  Many Gram negative rods Many WBC's  <10 epithelial cells per low power field.  Few Gram negative rods <10 epithelial cells per low power field.  Moderate WBC's  Few Gram negative rods  Rare Gram positive cocci <10 epithelial cells per low power field.  Rare WBC's  Rare Gram positive cocci  Rare yeast  Moderate Gram negative rods Rare WBC's  Few Gram negative rods  Rare Gram positive cocci   RESPIRATORYC No S aureus  or Pseudomonas isolated.  SERRATIA MARCESCENS  Many  *  PROVIDENCIA STUARTII  Many  Normal respiratory kenny also present  * PSEUDOMONAS AERUGINOSA  Moderate  Zerbaxa 1 S SOPHIA values are expressed in mcg/mL.  Testing performed by:  [facility]  [address] [place] [address]  Dr.Brian Loreto MD  *  PROTEUS MIRABILIS ESBL  Moderate  Results called to and read back by RN M3 ANKIT HOLDSWORTH  07/31/2022    09:37 JT  * KLEBSIELLA PNEUMONIAE ESBL  Moderate  *  ACINETOBACTER BAUMANNII   Moderate  Results called to and read back by Ester Warren RN-ED;  08/13/2022    08:33 CJD  *  --   --      Urine Culture:   Recent Labs   Lab 03/18/22  1010 03/20/22  0100 07/29/22  0545 08/10/22  1816   LABURIN PSEUDOMONAS AERUGINOSA   10,000 - 49,999 cfu/ml  * No growth Multiple organisms isolated. None in predominance.  Repeat if  clinically necessary. CANDIDA TROPICALIS  10,000 - 49,999 cfu/ml  Treatment of asymptomatic candiduria is not recommended (except for   specific populations). Candida isolated in the urine typically   represents colonization. If an indwelling urinary catheter is present  it should be removed or replaced.  *     Urine Studies:   Recent Labs   Lab 08/10/22  1816 08/22/22  2053 08/23/22  1415   COLORU Yellow   < > Yellow   APPEARANCEUA Clear   < > Hazy*   PHUR 7.0   < > 5.0   SPECGRAV >=1.030*   < > 1.015   PROTEINUA 2+*   < > 1+*   GLUCUA Negative   < > Negative   KETONESU Trace*   < > Trace*   BILIRUBINUA 1+*   < > Negative   OCCULTUA 3+*   < > 1+*   NITRITE Negative   < > Negative   UROBILINOGEN 4.0-6.0*  --   --    LEUKOCYTESUR Negative   < > 3+*   RBCUA 21*   < > 14*   WBCUA 75*   < > 57*   BACTERIA Negative   < > Few*   SQUAMEPITHEL 23   < > 1   HYALINECASTS 100*   < > 0    < > = values in this interval not displayed.       Significant Imaging: I have reviewed all pertinent imaging results/findings within the past 24 hours.

## 2022-08-23 NOTE — ASSESSMENT & PLAN NOTE
Right arm swelling that hs been progressing according to nursing home    --Elevate RUE  --US negative for DVT  --Lovenox for DVT prophylaxis

## 2022-08-23 NOTE — PLAN OF CARE
CMICU DAILY GOALS       A: Awake    RASS: Goal -    Actual -     Restraint necessity:    B: Breathe   SBT:  pt vent dependent at baseline    C: Coordinate A & B, analgesics/sedatives   Pain: managed    SAT:  vent dependent at baseline  D: Delirium   CAM-ICU: Overall CAM-ICU: Negative  E: Early(intubated/ Progressive (non-intubated) Mobility   MOVE Screen: Fail   Activity: Activity Management: Patient unable to perform activities  FAS: Feeding/Nutrition   Diet order: Diet/Nutrition Received: NPO, tube feeding,    T: Thrombus   DVT prophylaxis: VTE Required Core Measure: Pharmacological prophylaxis initiated/maintained  H: HOB Elevation   Head of Bed (HOB) Positioning: HOB at 30 degrees  U: Ulcer Prophylaxis   GI: yes  G: Glucose control   managed Glycemic Management: blood glucose monitored  S: Skin   Bathing/Skin Care: bath, partial, linen changed, incontinence care  Device Skin Pressure Protection: absorbent pad utilized/changed, adhesive use limited, pressure points protected, positioning supports utilized  Pressure Reduction Devices: foam padding utilized, heel offloading device utilized, positioning supports utilized, pressure-redistributing mattress utilized  Pressure Reduction Techniques: frequent weight shift encouraged, heels elevated off bed, positioned off wounds, pressure points protected, weight shift assistance provided  Skin Protection: adhesive use limited, incontinence pads utilized, hydrocolloids used, pulse oximeter probe site changed, protective footwear used, tubing/devices free from skin contact, transparent dressing maintained  B: Bowel Function   no issues   I: Indwelling Catheters   Hamilton necessity:      Urethral Catheter 08/23/22 1445 Silicone 18 Fr.-Reason for Continuing Urinary Catheterization: Chronic Indwelling Urinary Catheter on Admission  [REMOVED]      Urethral Catheter 08/02/22 0515 Non-latex 18 Fr.-Reason for Continuing Urinary Catheterization: Chronic Indwelling Urinary Catheter  on Admission   CVC necessity: No  D: De-escalation Antibiotics   No    Family/Goals of care/Code Status   Code Status: Full Code    24H Vital Sign Range  Temp:  [93.5 °F (34.2 °C)-97.7 °F (36.5 °C)]   Pulse:  []   Resp:  [17-24]   BP: ()/(54-85)   SpO2:  [95 %-100 %]      Shift Events   No acute events throughout shift. Hamilton exchanged for 18 Fr silicone Hamilton - urinalysis and urine cultures sent. Wound care consult in place.     VS and assessment per flow sheet, patient progressing towards goals as tolerated, plan of care reviewed with  patient and daughter , all concerns addressed, will continue to monitor.    Latonia Ashby

## 2022-08-23 NOTE — SUBJECTIVE & OBJECTIVE
Past Medical History:   Diagnosis Date    Anoxic brain damage 07/2020    Bedbound 07/2020    Cardiac arrest 07/2020    Cirrhosis     GERD (gastroesophageal reflux disease)     Hemangioma of intra-abdominal structure     Hypertension     Nursing home resident     Protein calorie malnutrition     Pulmonary embolus     Respiratory distress     S/P percutaneous endoscopic gastrostomy (PEG) tube placement 07/2020    Total self-care deficit 07/2020    Tracheostomy dependence     7/2020       Past Surgical History:   Procedure Laterality Date    PEG W/TRACHEOSTOMY PLACEMENT  07/27/2020    SKIN GRAFT      buttocks       Review of patient's allergies indicates:  No Known Allergies    Family History       Problem Relation (Age of Onset)    No Known Problems Mother, Father          Tobacco Use    Smoking status: Never Smoker    Smokeless tobacco: Never Used   Substance and Sexual Activity    Alcohol use: Not Currently    Drug use: Not Currently    Sexual activity: Not Currently      Review of Systems   Unable to perform ROS: Patient unresponsive   Objective:     Vital Signs (Most Recent):  Temp: (!) 93.5 °F (34.2 °C) (08/22/22 2207)  Pulse: 80 (08/22/22 2310)  Resp: 18 (08/22/22 2310)  BP: 114/80 (08/22/22 2207)  SpO2: 100 % (08/22/22 2310)   Vital Signs (24h Range):  Temp:  [93.5 °F (34.2 °C)-94.9 °F (34.9 °C)] 93.5 °F (34.2 °C)  Pulse:  [70-87] 80  Resp:  [18-22] 18  SpO2:  [97 %-100 %] 100 %  BP: ()/(70-85) 114/80   Weight: 76.6 kg (168 lb 14 oz)  Body mass index is 32.98 kg/m².      Intake/Output Summary (Last 24 hours) at 8/22/2022 2330  Last data filed at 8/22/2022 2310  Gross per 24 hour   Intake 500 ml   Output --   Net 500 ml       Physical Exam  Vitals and nursing note reviewed.   Constitutional:       Appearance: She is ill-appearing.   HENT:      Mouth/Throat:      Mouth: Mucous membranes are dry.   Eyes:      Comments: L eye cataract, Roaming eye movements, unable to focus or track   Cardiovascular:       Rate and Rhythm: Normal rate and regular rhythm.      Pulses: Normal pulses.      Heart sounds: Normal heart sounds.   Pulmonary:      Comments: Trach/Vent dependent  Abdominal:      General: There is distension.   Genitourinary:     Comments: Hamilton catheter in place  Musculoskeletal:      Comments: Withdrawal to pain  RUE swelling   Skin:     General: Skin is warm and dry.      Coloration: Skin is pale.   Neurological:      Mental Status: Mental status is at baseline.       Vents:  Ventilator Initiated: Yes (08/22/22 2034)  Oxygen Concentration (%): 40 (08/22/22 2310)  Lines/Drains/Airways       Peripherally Inserted Central Catheter Line  Duration             PICC Single Lumen 08/22/22 1945 left brachial <1 day              Drain  Duration                  Gastrostomy/Enterostomy LUQ -- days         Biliary Tube 01/13/22 221 days         Urethral Catheter 08/02/22 0515 Non-latex 18 Fr. 20 days              Airway  Duration                  Airway - Non-Surgical Other (Comment) -- days         Surgical Airway Portex Cuffed -- days              Peripheral Intravenous Line  Duration                  Peripheral IV - Single Lumen 08/22/22 1957 20 G Left Hand <1 day         Peripheral IV - Single Lumen 08/22/22 2015 22 G Left Forearm <1 day                  Significant Labs:    CBC/Anemia Profile:  Recent Labs   Lab 08/22/22 2003 08/22/22 2019   WBC 7.05  --    HGB 10.8*  --    HCT 34.6* 34*     --    MCV 84  --    RDW 18.6*  --         Chemistries:  Recent Labs   Lab 08/22/22 2003 08/22/22 2217   * 130*   K 6.1* 4.9    105   CO2 21* 19*   BUN 38* 36*   CREATININE 0.5 0.6   CALCIUM 10.1 10.1   ALBUMIN 3.2*  --    PROT 9.8*  --    BILITOT 1.1*  --    ALKPHOS 201*  --    ALT 42  --    AST 44*  --    MG 2.1  --        All pertinent labs within the past 24 hours have been reviewed.    Significant Imaging: I have reviewed all pertinent imaging results/findings within the past 24 hours.

## 2022-08-23 NOTE — NURSING
Notified Dr. Aparicio that pt UA and urine culture contaminated because does not appear that carroll was exchanged in ED therefore the urine sample would be from a  Carroll in place greater than 48 hours. MD states to replace Carroll and send off new urinalysis and culture.     8/23 @ 5620  18 Fr carroll inserted at bedside with no complications. UA and urine culture sent.

## 2022-08-23 NOTE — ASSESSMENT & PLAN NOTE
Right arm swelling that hs been progressing according to nursing home    --Elevate RUE  --RUE ultrasound pending  --Lovenox for DVT prophylaxis

## 2022-08-23 NOTE — PLAN OF CARE
Recommendations     1. When medically feasible, initiate TFs. Rec'd Isosource 1.5 @ 40 mL/hr to provide 1440 kcals, 65 g of protein, 733 mL fluid.   2. RD to monitor & follow-up.     Goals: Meet % EEN, EPN by RD f/u date  Nutrition Goal Status: new  Communication of RD Recs: reviewed with RN

## 2022-08-23 NOTE — ED TRIAGE NOTES
Pt arrives from [facility] for abnormal labs. Pts nurse unable to tell nurse or ems what the abnormal labs are. Pt is vented at baseline with known neurological deficits.     Past Medical History:   Diagnosis Date    Anoxic brain damage 07/2020    Bedbound 07/2020    Cardiac arrest 07/2020    Cirrhosis     GERD (gastroesophageal reflux disease)     Hemangioma of intra-abdominal structure     Hypertension     Nursing home resident     Protein calorie malnutrition     Pulmonary embolus     Respiratory distress     S/P percutaneous endoscopic gastrostomy (PEG) tube placement 07/2020    Total self-care deficit 07/2020    Tracheostomy dependence     7/2020       Past Surgical History:   Procedure Laterality Date    PEG W/TRACHEOSTOMY PLACEMENT  07/27/2020    SKIN GRAFT      buttocks       Family History   Problem Relation Age of Onset    No Known Problems Mother     No Known Problems Father        Social History     Socioeconomic History    Marital status:    Tobacco Use    Smoking status: Never Smoker    Smokeless tobacco: Never Used   Substance and Sexual Activity    Alcohol use: Not Currently    Drug use: Not Currently    Sexual activity: Not Currently       No current facility-administered medications for this encounter.     Current Outpatient Medications   Medication Sig Dispense Refill    0.9 % sodium chloride (SODIUM CHLORIDE 0.9 %) PgBk Inject into the vein.      acetaminophen (TYLENOL) 325 MG tablet 2 tablets (650 mg total) by Per G Tube route every 4 (four) hours as needed for Pain or Temperature greater than (100.4F).  0    acetylcysteine 100 mg/ml, 10%, (MUCOMYST) 100 mg/mL (10 %) nebulizer solution Take 4 mLs by nebulization every 8 (eight) hours.  12    albuterol-ipratropium (DUO-NEB) 2.5 mg-0.5 mg/3 mL nebulizer solution Take 3 mLs by nebulization every 6 (six) hours. Rescue 1 Box 0    arginine/glutamine/calcium bmb (XIOMARA ORAL) 1 Package by PEG Tube route  2 (two) times a day. In 8oz of water      artificial tears (ISOPTO TEARS) 0.5 % ophthalmic solution Place 1 drop into both eyes as needed.      chlorhexidine (PERIDEX) 0.12 % solution SMARTSIG:By Mouth      clonazePAM (KLONOPIN) 0.5 MG tablet Take 0.5 mg by mouth 2 (two) times daily.      ergocalciferol (ERGOCALCIFEROL) 50,000 unit Cap Take 50,000 Units by mouth every 7 days.      ferrous sulfate 300 mg (60 mg iron)/5 mL syrup Take 300 mg by mouth As instructed. Every other day      lactose-reduced food (ISOSOURCE ORAL) 1.5 per peg tube at 50ml/hr continuously      midodrine (PROAMATINE) 10 MG tablet 1 tablet (10 mg total) by Per G Tube route 3 (three) times daily. 90 tablet 11    oxybutynin (DITROPAN) 5 MG Tab Take 5 mg by mouth 2 (two) times daily.      polyethylene glycol (GLYCOLAX) 17 gram PwPk Take by mouth daily as needed.      tobramycin (NEBCIN) 40 mg/mL injection       whey protein isolate (BENEPROTEIN) 6 gram-25 kcal/7 gram Powd 7 g by PEG Tube route 2 (two) times a day. In 2oz of water         Review of patient's allergies indicates:  No Known Allergies

## 2022-08-23 NOTE — SUBJECTIVE & OBJECTIVE
Past Medical History:   Diagnosis Date    Anoxic brain damage 07/2020    Bedbound 07/2020    Cardiac arrest 07/2020    Cirrhosis     GERD (gastroesophageal reflux disease)     Hemangioma of intra-abdominal structure     Hypertension     Nursing home resident     Protein calorie malnutrition     Pulmonary embolus     Respiratory distress     S/P percutaneous endoscopic gastrostomy (PEG) tube placement 07/2020    Total self-care deficit 07/2020    Tracheostomy dependence     7/2020       Past Surgical History:   Procedure Laterality Date    PEG W/TRACHEOSTOMY PLACEMENT  07/27/2020    SKIN GRAFT      buttocks       Review of patient's allergies indicates:  No Known Allergies    Medications:  Continuous Infusions:   sodium chloride 0.9% 5 mL/hr at 08/23/22 1200     Scheduled Meds:   artificial tears  1 drop Both Eyes TID    [START ON 8/25/2022] ciprofloxacin HCl  1 drop Left Eye Q4H    enoxaparin  40 mg Subcutaneous Daily    famotidine  20 mg Per G Tube BID    meropenem (MERREM) IVPB  2 g Intravenous Q8H    polyethylene glycol  17 g Oral Daily    sennosides 8.8 mg/5 ml  5 mL Oral QHS     PRN Meds:acetaminophen, [COMPLETED] calcium gluconate IVPB **AND** calcium gluconate IVPB, dextrose 10%, dextrose 10%, sodium chloride 0.9%    Family History       Problem Relation (Age of Onset)    No Known Problems Mother, Father          Tobacco Use    Smoking status: Never Smoker    Smokeless tobacco: Never Used   Substance and Sexual Activity    Alcohol use: Not Currently    Drug use: Not Currently    Sexual activity: Not Currently       Review of Systems   Unable to perform ROS: Patient nonverbal   Objective:     Vital Signs (Most Recent):  Temp: 97.16 °F (36.2 °C) (08/23/22 1116)  Pulse: (!) 115 (08/23/22 1116)  Resp: 18 (08/23/22 1116)  BP: 107/66 (08/23/22 1116)  SpO2: 96 % (08/23/22 1116)   Vital Signs (24h Range):  Temp:  [93.5 °F (34.2 °C)-97.16 °F (36.2 °C)] 97.16 °F (36.2 °C)  Pulse:  [] 115  Resp:  [18-24]  18  SpO2:  [95 %-100 %] 96 %  BP: ()/(54-85) 107/66     Weight: 77.7 kg (171 lb 4.8 oz)  Body mass index is 33.45 kg/m².    Physical Exam  Vitals and nursing note reviewed.   Constitutional:       General: She is not in acute distress.     Appearance: She is ill-appearing.      Comments: awake   HENT:      Head: Normocephalic.      Right Ear: External ear normal.      Left Ear: External ear normal.      Nose: Nose normal.      Mouth/Throat:      Comments: Trach in place  Eyes:      Comments: Does not track   Cardiovascular:      Rate and Rhythm: Tachycardia present.   Pulmonary:      Effort: No respiratory distress.      Comments: On vent  Abdominal:      General: There is no distension.   Musculoskeletal:      Cervical back: Normal range of motion.      Comments: Moves extremities spontaneously   Neurological:      Comments: Does not follow commands, does not interact       Review of Symptoms      Symptom Assessment (ESAS 0-10 Scale)  Unable to complete assessment due to Patient nonverbal     CAM / Delirium:  Negative  Constipation:  Negative  Diarrhea:  Negative      Bowel Management Plan (BMP):  Yes      Pain Assessment in Advanced Demential Scale (PAINAD)   Breathing - Independent of vocalization:  0  Negative vocalization:  0  Facial expression:  0  Body language:  0  Consolability:  0  Total:  0    Modified Mahendra Scale:  0    Performance Status:  10    Living Arrangements:  Lives in nursing home    Psychosocial/Cultural: See palliative LCSW's psychosocial     Spiritual:  F - Leta and Belief:  Yes  I - Importance:  Yes  C - Community:  Yes  A - Address in Care:  Yes    Advance Care Planning   Advance Directives:   Living Will: No    LaPOST: Yes    Do Not Resuscitate Status: No    Medical Power of : No      Decision Making:  Family answered questions       Significant Labs: All pertinent labs within the past 24 hours have been reviewed.  CBC:   Recent Labs   Lab 08/23/22  0504   WBC 9.25   HGB  10.0*   HCT 32.4*   MCV 86        BMP:  Recent Labs   Lab 08/23/22  0504 08/23/22  1212   GLU 71 55*   * 130*   K 6.2* 4.4    100   CO2 21* 21*   BUN 38* 35*   CREATININE 0.6 0.6   CALCIUM 9.6 10.5   MG 2.0  --      LFT:  Lab Results   Component Value Date    AST 51 (H) 08/23/2022    ALKPHOS 193 (H) 08/23/2022    BILITOT 1.6 (H) 08/23/2022     Albumin:   Albumin   Date Value Ref Range Status   08/23/2022 3.2 (L) 3.5 - 5.2 g/dL Final     Protein:   Total Protein   Date Value Ref Range Status   08/23/2022 8.8 (H) 6.0 - 8.4 g/dL Final     Lactic acid:   Lab Results   Component Value Date    LACTATE 0.6 08/22/2022    LACTATE 0.7 08/10/2022       Significant Imaging: I have reviewed all pertinent imaging results/findings within the past 24 hours.    CT abdomen/pelvis 8/22/22  Impression:     No evidence of bowel obstruction or inflammation.     Cholecystostomy tube in stable position with decompressed gallbladder and cholelithiasis.     Persistent subsegmental opacities in the bilateral lung bases, right side greater than left.     Stable partially visualized soft tissue nodules in the right lateral chest wall when compared with multiple prior studies.

## 2022-08-24 LAB
ALBUMIN SERPL BCP-MCNC: 3 G/DL (ref 3.5–5.2)
ALP SERPL-CCNC: 199 U/L (ref 55–135)
ALT SERPL W/O P-5'-P-CCNC: 40 U/L (ref 10–44)
ANION GAP SERPL CALC-SCNC: 12 MMOL/L (ref 8–16)
ANION GAP SERPL CALC-SCNC: 12 MMOL/L (ref 8–16)
ANION GAP SERPL CALC-SCNC: 7 MMOL/L (ref 8–16)
ANION GAP SERPL CALC-SCNC: 7 MMOL/L (ref 8–16)
ANION GAP SERPL CALC-SCNC: 8 MMOL/L (ref 8–16)
ANION GAP SERPL CALC-SCNC: 8 MMOL/L (ref 8–16)
AST SERPL-CCNC: 33 U/L (ref 10–40)
BACTERIA UR CULT: NO GROWTH
BASOPHILS # BLD AUTO: 0.02 K/UL (ref 0–0.2)
BASOPHILS NFR BLD: 0.3 % (ref 0–1.9)
BILIRUB SERPL-MCNC: 1.2 MG/DL (ref 0.1–1)
BUN SERPL-MCNC: 23 MG/DL (ref 6–20)
BUN SERPL-MCNC: 24 MG/DL (ref 6–20)
BUN SERPL-MCNC: 27 MG/DL (ref 6–20)
BUN SERPL-MCNC: 29 MG/DL (ref 6–20)
CALCIUM SERPL-MCNC: 10 MG/DL (ref 8.7–10.5)
CALCIUM SERPL-MCNC: 10.2 MG/DL (ref 8.7–10.5)
CALCIUM SERPL-MCNC: 10.2 MG/DL (ref 8.7–10.5)
CALCIUM SERPL-MCNC: 10.3 MG/DL (ref 8.7–10.5)
CALCIUM SERPL-MCNC: 9.7 MG/DL (ref 8.7–10.5)
CALCIUM SERPL-MCNC: 9.9 MG/DL (ref 8.7–10.5)
CHLORIDE SERPL-SCNC: 103 MMOL/L (ref 95–110)
CHLORIDE SERPL-SCNC: 105 MMOL/L (ref 95–110)
CO2 SERPL-SCNC: 21 MMOL/L (ref 23–29)
CO2 SERPL-SCNC: 22 MMOL/L (ref 23–29)
CO2 SERPL-SCNC: 25 MMOL/L (ref 23–29)
CO2 SERPL-SCNC: 26 MMOL/L (ref 23–29)
CREAT SERPL-MCNC: 0.6 MG/DL (ref 0.5–1.4)
DIFFERENTIAL METHOD: ABNORMAL
EOSINOPHIL # BLD AUTO: 0.3 K/UL (ref 0–0.5)
EOSINOPHIL NFR BLD: 4.5 % (ref 0–8)
ERYTHROCYTE [DISTWIDTH] IN BLOOD BY AUTOMATED COUNT: 18.3 % (ref 11.5–14.5)
EST. GFR  (NO RACE VARIABLE): >60 ML/MIN/1.73 M^2
GLUCOSE SERPL-MCNC: 102 MG/DL (ref 70–110)
GLUCOSE SERPL-MCNC: 121 MG/DL (ref 70–110)
GLUCOSE SERPL-MCNC: 84 MG/DL (ref 70–110)
GLUCOSE SERPL-MCNC: 84 MG/DL (ref 70–110)
GLUCOSE SERPL-MCNC: 88 MG/DL (ref 70–110)
GLUCOSE SERPL-MCNC: 92 MG/DL (ref 70–110)
HCT VFR BLD AUTO: 31.9 % (ref 37–48.5)
HCV AB SERPL QL IA: NEGATIVE
HGB BLD-MCNC: 9.9 G/DL (ref 12–16)
IMM GRANULOCYTES # BLD AUTO: 0.02 K/UL (ref 0–0.04)
IMM GRANULOCYTES NFR BLD AUTO: 0.3 % (ref 0–0.5)
LYMPHOCYTES # BLD AUTO: 1.4 K/UL (ref 1–4.8)
LYMPHOCYTES NFR BLD: 18.7 % (ref 18–48)
MAGNESIUM SERPL-MCNC: 1.9 MG/DL (ref 1.6–2.6)
MCH RBC QN AUTO: 27 PG (ref 27–31)
MCHC RBC AUTO-ENTMCNC: 31 G/DL (ref 32–36)
MCV RBC AUTO: 87 FL (ref 82–98)
MONOCYTES # BLD AUTO: 0.8 K/UL (ref 0.3–1)
MONOCYTES NFR BLD: 10.7 % (ref 4–15)
NEUTROPHILS # BLD AUTO: 4.8 K/UL (ref 1.8–7.7)
NEUTROPHILS NFR BLD: 65.5 % (ref 38–73)
NRBC BLD-RTO: 0 /100 WBC
PHOSPHATE SERPL-MCNC: 3.2 MG/DL (ref 2.7–4.5)
PLATELET # BLD AUTO: 304 K/UL (ref 150–450)
PMV BLD AUTO: 11.2 FL (ref 9.2–12.9)
POCT GLUCOSE: 105 MG/DL (ref 70–110)
POCT GLUCOSE: 111 MG/DL (ref 70–110)
POCT GLUCOSE: 113 MG/DL (ref 70–110)
POCT GLUCOSE: 127 MG/DL (ref 70–110)
POCT GLUCOSE: 143 MG/DL (ref 70–110)
POTASSIUM SERPL-SCNC: 4.4 MMOL/L (ref 3.5–5.1)
POTASSIUM SERPL-SCNC: 4.6 MMOL/L (ref 3.5–5.1)
POTASSIUM SERPL-SCNC: 4.9 MMOL/L (ref 3.5–5.1)
POTASSIUM SERPL-SCNC: 4.9 MMOL/L (ref 3.5–5.1)
PROT SERPL-MCNC: 9.4 G/DL (ref 6–8.4)
RBC # BLD AUTO: 3.67 M/UL (ref 4–5.4)
SODIUM SERPL-SCNC: 135 MMOL/L (ref 136–145)
SODIUM SERPL-SCNC: 136 MMOL/L (ref 136–145)
SODIUM SERPL-SCNC: 137 MMOL/L (ref 136–145)
SODIUM SERPL-SCNC: 138 MMOL/L (ref 136–145)
WBC # BLD AUTO: 7.26 K/UL (ref 3.9–12.7)

## 2022-08-24 PROCEDURE — 63600175 PHARM REV CODE 636 W HCPCS: Performed by: INTERNAL MEDICINE

## 2022-08-24 PROCEDURE — 94003 VENT MGMT INPAT SUBQ DAY: CPT

## 2022-08-24 PROCEDURE — 27200966 HC CLOSED SUCTION SYSTEM

## 2022-08-24 PROCEDURE — 85025 COMPLETE CBC W/AUTO DIFF WBC: CPT | Performed by: NURSE PRACTITIONER

## 2022-08-24 PROCEDURE — 20000000 HC ICU ROOM

## 2022-08-24 PROCEDURE — 80048 BASIC METABOLIC PNL TOTAL CA: CPT | Mod: 91,XB

## 2022-08-24 PROCEDURE — 99233 SBSQ HOSP IP/OBS HIGH 50: CPT | Mod: GC,,, | Performed by: INTERNAL MEDICINE

## 2022-08-24 PROCEDURE — 25000242 PHARM REV CODE 250 ALT 637 W/ HCPCS

## 2022-08-24 PROCEDURE — 99233 PR SUBSEQUENT HOSPITAL CARE,LEVL III: ICD-10-PCS | Mod: GC,,, | Performed by: INTERNAL MEDICINE

## 2022-08-24 PROCEDURE — 94761 N-INVAS EAR/PLS OXIMETRY MLT: CPT

## 2022-08-24 PROCEDURE — 94640 AIRWAY INHALATION TREATMENT: CPT

## 2022-08-24 PROCEDURE — 99900035 HC TECH TIME PER 15 MIN (STAT)

## 2022-08-24 PROCEDURE — 84100 ASSAY OF PHOSPHORUS: CPT | Performed by: NURSE PRACTITIONER

## 2022-08-24 PROCEDURE — 25000003 PHARM REV CODE 250: Performed by: INTERNAL MEDICINE

## 2022-08-24 PROCEDURE — 27000221 HC OXYGEN, UP TO 24 HOURS

## 2022-08-24 PROCEDURE — 99900026 HC AIRWAY MAINTENANCE (STAT)

## 2022-08-24 PROCEDURE — 27000207 HC ISOLATION

## 2022-08-24 PROCEDURE — 82803 BLOOD GASES ANY COMBINATION: CPT

## 2022-08-24 PROCEDURE — 80053 COMPREHEN METABOLIC PANEL: CPT | Performed by: NURSE PRACTITIONER

## 2022-08-24 PROCEDURE — 83735 ASSAY OF MAGNESIUM: CPT | Performed by: NURSE PRACTITIONER

## 2022-08-24 PROCEDURE — 99233 SBSQ HOSP IP/OBS HIGH 50: CPT | Mod: ,,, | Performed by: INTERNAL MEDICINE

## 2022-08-24 PROCEDURE — 63600175 PHARM REV CODE 636 W HCPCS: Performed by: NURSE PRACTITIONER

## 2022-08-24 PROCEDURE — 99233 PR SUBSEQUENT HOSPITAL CARE,LEVL III: ICD-10-PCS | Mod: ,,, | Performed by: INTERNAL MEDICINE

## 2022-08-24 PROCEDURE — 25000003 PHARM REV CODE 250: Performed by: NURSE PRACTITIONER

## 2022-08-24 RX ORDER — IPRATROPIUM BROMIDE AND ALBUTEROL SULFATE 2.5; .5 MG/3ML; MG/3ML
3 SOLUTION RESPIRATORY (INHALATION) EVERY 6 HOURS PRN
Status: DISCONTINUED | OUTPATIENT
Start: 2022-08-24 | End: 2022-08-26 | Stop reason: HOSPADM

## 2022-08-24 RX ORDER — MUPIROCIN 20 MG/G
OINTMENT TOPICAL 2 TIMES DAILY
Status: DISCONTINUED | OUTPATIENT
Start: 2022-08-24 | End: 2022-08-26 | Stop reason: HOSPADM

## 2022-08-24 RX ADMIN — VANCOMYCIN HYDROCHLORIDE 1250 MG: 1.25 INJECTION, POWDER, LYOPHILIZED, FOR SOLUTION INTRAVENOUS at 11:08

## 2022-08-24 RX ADMIN — ENOXAPARIN SODIUM 40 MG: 40 INJECTION SUBCUTANEOUS at 05:08

## 2022-08-24 RX ADMIN — MUPIROCIN: 20 OINTMENT TOPICAL at 09:08

## 2022-08-24 RX ADMIN — FAMOTIDINE 20 MG: 20 TABLET ORAL at 09:08

## 2022-08-24 RX ADMIN — MEROPENEM 2 G: 1 INJECTION INTRAVENOUS at 12:08

## 2022-08-24 RX ADMIN — SENNOSIDES 5 ML: 8.8 SYRUP ORAL at 08:08

## 2022-08-24 RX ADMIN — MEROPENEM 2 G: 1 INJECTION INTRAVENOUS at 04:08

## 2022-08-24 RX ADMIN — MUPIROCIN: 20 OINTMENT TOPICAL at 08:08

## 2022-08-24 RX ADMIN — MEROPENEM 2 G: 1 INJECTION INTRAVENOUS at 08:08

## 2022-08-24 RX ADMIN — FAMOTIDINE 20 MG: 20 TABLET ORAL at 08:08

## 2022-08-24 RX ADMIN — HYPROMELLOSE 2910 1 DROP: 5 SOLUTION OPHTHALMIC at 09:08

## 2022-08-24 RX ADMIN — IPRATROPIUM BROMIDE AND ALBUTEROL SULFATE 3 ML: 2.5; .5 SOLUTION RESPIRATORY (INHALATION) at 08:08

## 2022-08-24 RX ADMIN — POLYETHYLENE GLYCOL 3350 17 G: 17 POWDER, FOR SOLUTION ORAL at 09:08

## 2022-08-24 RX ADMIN — HYPROMELLOSE 2910 1 DROP: 5 SOLUTION OPHTHALMIC at 08:08

## 2022-08-24 RX ADMIN — HYPROMELLOSE 2910 1 DROP: 5 SOLUTION OPHTHALMIC at 03:08

## 2022-08-24 NOTE — ASSESSMENT & PLAN NOTE
This patient does have evidence of infective focus  My overall impression is sepsis. Vital signs were reviewed and noted in progress note.  Antibiotics given-   Antibiotics (From admission, onward)            Start     Stop Route Frequency Ordered    08/25/22 0200  ciprofloxacin HCl 0.3 % ophthalmic solution 1 drop         08/30 0159 LEFT EYE Every 4 hours 08/23/22 0640    08/24/22 1130  vancomycin 1.25 g in dextrose 5% 250 mL IVPB (ready to mix)         -- IV Every 12 hours (non-standard times) 08/24/22 0356    08/24/22 0900  mupirocin 2 % ointment         08/29 0859 Nasl 2 times daily 08/24/22 0751    08/23/22 1200  meropenem (MERREM) 2 g in sodium chloride 0.9% 100 mL IVPB         -- IV Every 8 hours (non-standard times) 08/23/22 0930        Cultures were taken-   Microbiology Results (last 7 days)     Procedure Component Value Units Date/Time    Urine culture [[id number]]  (Abnormal) Collected: 08/22/22 2053    Order Status: Completed Specimen: Urine Updated: 08/24/22 1235     Urine Culture, Routine ENTEROCOCCUS SPECIES  10,000 - 49,999 cfu/ml  Identification and susceptibility pending        CANDIDA TROPICALIS  > 100,000 cfu/ml  Identification pending  Treatment of asymptomatic candiduria is not recommended (except for   specific populations). Candida isolated in the urine typically   represents colonization. If an indwelling urinary catheter is present  it should be removed or replaced.      Narrative:      Specimen Source->Urine    Culture, Respiratory with Gram Stain [[id number]]  (Abnormal) Collected: 08/23/22 0314    Order Status: Completed Specimen: Respiratory from Tracheal Aspirate Updated: 08/24/22 1000     Respiratory Culture GRAM NEGATIVE CHRISTINA  Many  Identification and susceptibility pending       Gram Stain (Respiratory) <10 epithelial cells per low power field.     Gram Stain (Respiratory) Rare WBC's     Gram Stain (Respiratory) Rare Gram positive cocci     Gram Stain (Respiratory) Rare yeast     Gram  Stain (Respiratory) Moderate Gram negative rods    Culture, Respiratory with Gram Stain [[id number]]  (Abnormal) Collected: 08/23/22 1502    Order Status: Completed Specimen: Respiratory from Tracheal Aspirate Updated: 08/24/22 0956     Respiratory Culture GRAM NEGATIVE CHRISTINA  Many  Identification and susceptibility pending       Gram Stain (Respiratory) Rare WBC's     Gram Stain (Respiratory) Few Gram negative rods     Gram Stain (Respiratory) Rare Gram positive cocci    Blood culture [[id number]] Collected: 08/23/22 2335    Order Status: Completed Specimen: Blood from Peripheral, Forearm, Right Updated: 08/24/22 0915     Blood Culture, Routine No Growth to date    Blood culture [[id number]] Collected: 08/23/22 2334    Order Status: Completed Specimen: Blood from Peripheral, Antecubital, Right Updated: 08/24/22 0915     Blood Culture, Routine No Growth to date    Blood culture #2 **CANNOT BE ORDERED STAT** [[id number]] Collected: 08/22/22 2010    Order Status: Completed Specimen: Blood from Peripheral, Forearm, Left Updated: 08/24/22 0907     Blood Culture, Routine Gram stain aer bottle: Gram positive cocci in clusters resembling Staph      Results called to and read back by: Samantha Barroso RN. 08/23/2022        21:35    Blood culture #1 **CANNOT BE ORDERED STAT** [[id number]] Collected: 08/22/22 1951    Order Status: Completed Specimen: Blood from Peripheral, Hand, Left Updated: 08/23/22 2212     Blood Culture, Routine No Growth to date      No Growth to date    Urine culture [[id number]] Collected: 08/23/22 1415    Order Status: No result Specimen: Urine Updated: 08/23/22 1458        Latest lactate reviewed, they are-  Recent Labs   Lab 08/22/22  2003   LACTATE 0.6       Organ dysfunction indicated by Acute kidney injury  Source- Urine vs. Lung    Source control Achieved by- Antibiotics    Pt with hx of multiple drug resistant organisms including Proteus, Pseudomonas, Klebsiella and Acinetobacter. Py previously on  Zerbexa, unclear duration.     Bxl with GPC in 1/2 bottles. Repeat cx ordered. Resp cx with GNR.  UCx with Enterococcus and Candida    --ID consulted   --Continue Meropenem, Vanc for now

## 2022-08-24 NOTE — PLAN OF CARE
[facility] DAILY GOALS       A: Awake    RASS: Goal -    Actual -     Restraint necessity:    B: Breathe   SBT: Not attempted   C: Coordinate A & B, analgesics/sedatives   Pain: managed    SAT: Not attempted  D: Delirium   CAM-ICU: Overall CAM-ICU: Positive  E: Early(intubated/ Progressive (non-intubated) Mobility   MOVE Screen: Fail   Activity: Activity Management: Rolling - L1  FAS: Feeding/Nutrition   Diet order: Diet/Nutrition Received: NPO, tube feeding,    T: Thrombus   DVT prophylaxis: VTE Required Core Measure: Pharmacological prophylaxis initiated/maintained  H: HOB Elevation   Head of Bed (HOB) Positioning: HOB elevated  U: Ulcer Prophylaxis   GI: yes  G: Glucose control   managed Glycemic Management: blood glucose monitored  S: Skin   Bathing/Skin Care: bath, complete, dressed/undressed, electrode patches/site rotation, incontinence care, linen changed  Device Skin Pressure Protection: absorbent pad utilized/changed, adhesive use limited, skin-to-device areas padded  Pressure Reduction Devices: specialty bed utilized, heel offloading device utilized, positioning supports utilized  Pressure Reduction Techniques: weight shift assistance provided  Skin Protection: incontinence pads utilized, silicone foam dressing in place, skin-to-device areas padded  B: Bowel Function   no issues   I: Indwelling Catheters   Hamilton necessity:      Urethral Catheter 08/23/22 1445 Silicone 18 Fr.-Reason for Continuing Urinary Catheterization: Critically ill in ICU and requiring hourly monitoring of intake/output  [REMOVED]      Urethral Catheter 08/02/22 0515 Non-latex 18 Fr.-Reason for Continuing Urinary Catheterization: Chronic Indwelling Urinary Catheter on Admission   CVC necessity: Yes  D: De-escalation Antibiotics   No    Family/Goals of care/Code Status   Code Status: Full Code    24H Vital Sign Range  Temp:  [95.72 °F (35.4 °C)-99.68 °F (37.6 °C)]   Pulse:  []   Resp:  [14-28]   BP: (102-145)/(64-85)   SpO2:  [93  %-100 %]      Shift Events   No acute events throughout shift. Administered medications per orders, pt tolerated well. TF increased to goal. Strict I and O maintained.    VS and assessment per flow sheet, patient progressing towards goals as tolerated, plan of care reviewed with family, all concerns addressed, will continue to monitor.    Emi Solomon, RN, BSN

## 2022-08-24 NOTE — PLAN OF CARE
Pt is a resident of [facility]. SW notified of upcoming discharge and sent updates.     COLE Zayas, LMSW [facility]  [id number]

## 2022-08-24 NOTE — PROGRESS NOTES
Pharmacokinetic Initial Assessment: IV Vancomycin    Assessment/Plan:    Initiate intravenous vancomycin with loading dose of  1500mg once followed by a maintenance dose of vancomycin 1250mg IV every 12 hours  Desired empiric serum trough concentration is 15 to 20 mcg/mL  Draw vancomycin trough level 60 min prior to fourth dose on 8/25/2022 at approximately 1030  Pharmacy will continue to follow and monitor vancomycin.      Please contact pharmacy at extension [phone] with any questions regarding this assessment.     Thank you for the consult,   Zane Spencer, Pharm.D.  Inpatient Pharmacist     Patient brief summary:  Genna Felix is a 61 y.o. female initiated on antimicrobial therapy with IV Vancomycin for treatment of suspected bacteremia    Drug Allergies:   Review of patient's allergies indicates:  No Known Allergies    Actual Body Weight:   77.7 kg  BMI: 33.45    Renal Function:   Estimated Creatinine Clearance: 93.1 mL/min (based on SCr of 0.6 mg/dL).,     Dialysis Method (if applicable):  N/A    CBC (last 72 hours):  Recent Labs   Lab Result Units 08/22/22 2003 08/23/22  0504   WBC K/uL 7.05 9.25   Hemoglobin g/dL 10.8* 10.0*   Hematocrit % 34.6* 32.4*   Platelets K/uL 368 346   Gran % % 66.9 78.7*   Lymph % % 17.9* 9.1*   Mono % % 9.1 7.6   Eosinophil % % 5.2 4.1   Basophil % % 0.6 0.3   Differential Method  Automated Automated       Metabolic Panel (last 72 hours):  Recent Labs   Lab Result Units 08/22/22 2003 08/22/22 2053 08/22/22  2217 08/23/22  0504 08/23/22  1212 08/23/22  1415 08/23/22  1551 08/23/22 2002 08/23/22  2351   Sodium mmol/L 132*  --  130* 130* 130*  --  133* 133* 137   Potassium mmol/L 6.1*  --  4.9 6.2* 4.4  --  5.5* 4.9 4.6   Chloride mmol/L 104  --  105 100 100  --  100 103 103   CO2 mmol/L 21*  --  19* 21* 21*  --  24 24 22*   Glucose mg/dL 85  --  109 71 55*  --  81 97 102   Glucose, UA   --  Negative  --   --   --  Negative  --   --   --    BUN mg/dL 38*  --  36* 38* 35*  --   32* 30* 29*   Creatinine mg/dL 0.5  --  0.6 0.6 0.6  --  0.6 0.6 0.6   Albumin g/dL 3.2*  --   --  3.2*  --   --   --   --   --    Total Bilirubin mg/dL 1.1*  --   --  1.6*  --   --   --   --   --    Alkaline Phosphatase U/L 201*  --   --  193*  --   --   --   --   --    AST U/L 44*  --   --  51*  --   --   --   --   --    ALT U/L 42  --   --  48*  --   --   --   --   --    Magnesium mg/dL 2.1  --   --  2.0  --   --   --   --   --    Phosphorus mg/dL  --   --   --  3.7  --   --   --   --   --        Drug levels (last 3 results):  No results for input(s): VANCOMYCINRA, VANCORANDOM, VANCOMYCINPE, VANCOPEAK, VANCOMYCINTR, VANCOTROUGH in the last 72 hours.    Microbiologic Results:  Microbiology Results (last 7 days)     Procedure Component Value Units Date/Time    Blood culture [[id number]] Collected: 08/23/22 2335    Order Status: Sent Specimen: Blood from Peripheral, Forearm, Right Updated: 08/23/22 2349    Blood culture [[id number]] Collected: 08/23/22 2334    Order Status: Sent Specimen: Blood from Peripheral, Antecubital, Right Updated: 08/23/22 2349    Blood culture #1 **CANNOT BE ORDERED STAT** [[id number]] Collected: 08/22/22 1951    Order Status: Completed Specimen: Blood from Peripheral, Hand, Left Updated: 08/23/22 2212     Blood Culture, Routine No Growth to date      No Growth to date    Blood culture #2 **CANNOT BE ORDERED STAT** [[id number]] Collected: 08/22/22 2010    Order Status: Completed Specimen: Blood from Peripheral, Forearm, Left Updated: 08/23/22 2139     Blood Culture, Routine Gram stain aer bottle: Gram positive cocci in clusters resembling Staph      Results called to and read back by: Samantha Barroso RN. 08/23/2022        21:35    Culture, Respiratory with Gram Stain [[id number]] Collected: 08/23/22 1502    Order Status: Completed Specimen: Respiratory from Tracheal Aspirate Updated: 08/23/22 1933     Gram Stain (Respiratory) Rare WBC's     Gram Stain (Respiratory) Few Gram negative rods      Gram Stain (Respiratory) Rare Gram positive cocci    Urine culture [[id number]] Collected: 08/23/22 1415    Order Status: No result Specimen: Urine Updated: 08/23/22 1458    Culture, Respiratory with Gram Stain [[id number]] Collected: 08/23/22 0314    Order Status: Completed Specimen: Respiratory from Tracheal Aspirate Updated: 08/23/22 0432     Gram Stain (Respiratory) <10 epithelial cells per low power field.     Gram Stain (Respiratory) Rare WBC's     Gram Stain (Respiratory) Rare Gram positive cocci     Gram Stain (Respiratory) Rare yeast     Gram Stain (Respiratory) Moderate Gram negative rods    Urine culture [[id number]] Collected: 08/22/22 2053    Order Status: No result Specimen: Urine Updated: 08/22/22 2117

## 2022-08-24 NOTE — SUBJECTIVE & OBJECTIVE
Interval History/Significant Events: NAEO.  As above.    Review of Systems   Unable to perform ROS: Patient nonverbal   Objective:     Vital Signs (Most Recent):  Temp: 99.14 °F (37.3 °C) (08/24/22 1200)  Pulse: 109 (08/24/22 1200)  Resp: 16 (08/24/22 1200)  BP: (!) 145/77 (08/24/22 1200)  SpO2: 100 % (08/24/22 1200)   Vital Signs (24h Range):  Temp:  [95.72 °F (35.4 °C)-99.14 °F (37.3 °C)] 99.14 °F (37.3 °C)  Pulse:  [] 109  Resp:  [14-28] 16  SpO2:  [93 %-100 %] 100 %  BP: (102-145)/(62-81) 145/77   Weight: 77.4 kg (170 lb 10.2 oz)  Body mass index is 33.33 kg/m².      Intake/Output Summary (Last 24 hours) at 8/24/2022 1343  Last data filed at 8/24/2022 1243  Gross per 24 hour   Intake 1485.41 ml   Output 1655 ml   Net -169.59 ml       Physical Exam  Vitals and nursing note reviewed.   Constitutional:       Appearance: She is ill-appearing.   HENT:      Head: Atraumatic.      Right Ear: External ear normal.      Left Ear: External ear normal.      Nose: Nose normal. No rhinorrhea.      Mouth/Throat:      Mouth: Mucous membranes are dry.   Eyes:      Comments: L eye cataract, Roaming eye movements, unable to focus or track   Cardiovascular:      Rate and Rhythm: Normal rate and regular rhythm.      Pulses: Normal pulses.      Heart sounds: Normal heart sounds.   Pulmonary:      Comments: Trach/Vent dependent  Abdominal:      General: There is distension.   Genitourinary:     Comments: Hamilton catheter in place  Musculoskeletal:      Right lower leg: No edema.      Left lower leg: No edema.      Comments: Withdrawal to pain, upper extremity contracture  RUE swelling   Skin:     General: Skin is warm and dry.      Coloration: Skin is pale.   Neurological:      Mental Status: Mental status is at baseline.       Vents:  Vent Mode: A/C (08/24/22 0818)  Ventilator Initiated: Yes (08/22/22 2034)  Set Rate: 14 BPM (08/24/22 0818)  Vt Set: 400 mL (08/24/22 0818)  PEEP/CPAP: 5 cmH20 (08/24/22 0818)  Oxygen Concentration  (%): 25 (08/24/22 1200)  Peak Airway Pressure: 46 cmH2O (08/24/22 0818)  Plateau Pressure: 31 cmH20 (08/24/22 0818)  Total Ve: 6.23 mL (08/24/22 0818)  Negative Inspiratory Force (cm H2O): 0 (08/24/22 0818)  F/VT Ratio<105 (RSBI): (!) 57.42 (08/24/22 0818)  Lines/Drains/Airways       Drain  Duration                  Biliary Tube RUQ -- days         Gastrostomy/Enterostomy LUQ -- days         Biliary Tube 01/13/22 223 days         Urethral Catheter 08/23/22 1445 Silicone 18 Fr. <1 day              Airway  Duration                  Surgical Airway Portex Cuffed -- days              Peripheral Intravenous Line  Duration                  Midline Catheter Insertion/Assessment  - Single Lumen 08/19/22 Left brachial vein 5 days         Peripheral IV - Single Lumen 08/22/22 1957 20 G Left Hand 1 day         Peripheral IV - Single Lumen 08/22/22 2015 22 G Left Forearm 1 day                  Significant Labs:    CBC/Anemia Profile:  Recent Labs   Lab 08/22/22 2003 08/22/22 2019 08/23/22  0504 08/24/22  0354   WBC 7.05  --  9.25 7.26   HGB 10.8*  --  10.0* 9.9*   HCT 34.6* 34* 32.4* 31.9*     --  346 304   MCV 84  --  86 87   RDW 18.6*  --  18.2* 18.3*        Chemistries:  Recent Labs   Lab 08/22/22 2003 08/22/22 2217 08/23/22  0504 08/23/22  1212 08/24/22  0354 08/24/22  0757 08/24/22  1241   *   < > 130*   < > 136  136 135* 136   K 6.1*   < > 6.2*   < > 4.9  4.9 4.6 4.4      < > 100   < > 103  103 103 103   CO2 21*   < > 21*   < > 25  25 25 26   BUN 38*   < > 38*   < > 29*  29* 27* 24*   CREATININE 0.5   < > 0.6   < > 0.6  0.6 0.6 0.6   CALCIUM 10.1   < > 9.6   < > 10.2  10.2 10.0 9.7   ALBUMIN 3.2*  --  3.2*  --  3.0*  --   --    PROT 9.8*  --  8.8*  --  9.4*  --   --    BILITOT 1.1*  --  1.6*  --  1.2*  --   --    ALKPHOS 201*  --  193*  --  199*  --   --    ALT 42  --  48*  --  40  --   --    AST 44*  --  51*  --  33  --   --    MG 2.1  --  2.0  --  1.9  --   --    PHOS  --   --  3.7  --  3.2   --   --     < > = values in this interval not displayed.       Blood Culture:   Recent Labs   Lab 08/22/22 2010 08/23/22  2334 08/23/22  2335   LABBLOO Gram stain aer bottle: Gram positive cocci in clusters resembling Staph  Results called to and read back by: Samantha Barroso RN. 08/23/2022    21:35 No Growth to date No Growth to date     All pertinent labs within the past 24 hours have been reviewed.    Significant Imaging:  I have reviewed all pertinent imaging results/findings within the past 24 hours.

## 2022-08-24 NOTE — PROGRESS NOTES
[facility] - Cardiac Medical ICU  Infectious Disease  Progress Note    Patient Name: Genna Felix  MRN: [id number]  Admission Date: 8/22/2022  Length of Stay: 2 days  Attending Physician: Tonny Aparicio MD  Primary Care Provider: Primary Doctor No    Isolation Status: Contact  Assessment/Plan:      * Sepsis  Hypothermic and with tachycardia on admission with concern for sepsis as underlying etiology.  Potential sources of infection include the respiratory tract, intra-abdominal, urinary tract, and skin and soft tissue infection.  She does not have she does not have leukocytosis laboratory workup.  Chest x-ray shows bibasilar patchy opacities and CT of the abdomen shows persistent subsegmental opacities at the bilateral lung bases.  Changes have been present since at least 08/10/2022.  On on examination a right arm up to the bone shows changes concerning for a skin soft tissue infection.  Ultrasound to rule out DVT showed a small complex fluid collection in the soft tissues of the inner arm measuring 1.4 x 1 x 0.8 cm.    Remains hypothermic and on warming blanket. No leukocytosis noted. Blood cultures 1 of 4 for GPC. Respiratory culture with GNR pending identification.    · Continue meropenem and vancomycin for now.  · Pharmacy consult to manage vancomycin levels and therapeutic drug dosing.  · Will follow-up pending cultures.  · Would consider repeat urinalysis with reflex to culture after Hamilton catheter tubing is exchanged.      Swelling of arm  Concern for cellulitis due to skin excoriations. Complex fluid collection seen on imaging.   · Management as above.       Anticipated Disposition: per primary    Thank you for your consult. I will follow-up with patient. Please contact us if you have any additional questions.    Stephie Mendiola MD  Infectious Disease  [facility] - Cardiac Medical [facility]    Subjective:     Principal Problem:Sepsis    HPI: A 61-year-old woman with anoxic brain injury in 2020, a persistent  "vegetative state, ventilator dependent with tracheostomy plus PEG, acute cholecystitis status post IR drainage in January, and recent inpatient admission at [facility] for possible aspiration pneumonia with MDRO organisms who was brought in to [facility] for evaluation from her nursing home due to right arm swelling.  On evaluation she was noted to be hypothermic and laboratory workup revealed hyperkalemia.  Urinalysis revealed pyuria however this was performed from an old Hamilton catheter.  She was admitted to the ICU for management of sepsis suspected to be from pneumonia versus UTI.  She was started on empiric meropenem and vancomycin.    Of note, the patient was treated with Zerbaxa and transition to Cipro on discharge.    Infectious DIseases consulted for "Started Meropenem due to hx of Klebsiella ESBL and Acinetobacter"              Interval History: "". Respiratory cultures with GNR. Blood cultures with GPC.     Review of Systems   Unable to perform ROS: Patient nonverbal   Objective:     Vital Signs (Most Recent):  Temp: 98.78 °F (37.1 °C) (08/24/22 1105)  Pulse: 109 (08/24/22 1105)  Resp: 17 (08/24/22 1105)  BP: 131/65 (08/24/22 1105)  SpO2: 100 % (08/24/22 1105) Vital Signs (24h Range):  Temp:  [95.72 °F (35.4 °C)-98.78 °F (37.1 °C)] 98.78 °F (37.1 °C)  Pulse:  [] 109  Resp:  [14-28] 17  SpO2:  [93 %-100 %] 100 %  BP: (100-131)/(62-81) 131/65     Weight: 77.4 kg (170 lb 10.2 oz)  Body mass index is 33.33 kg/m².    Estimated Creatinine Clearance: 92.9 mL/min (based on SCr of 0.6 mg/dL).    Physical Exam  Vitals and nursing note reviewed.   Constitutional:       Appearance: She is well-developed.      Interventions: She is intubated.   Eyes:      General: No scleral icterus.        Right eye: No discharge.         Left eye: No discharge.   Neck:      Comments: Tracheostomy in place.   Cardiovascular:      Rate and Rhythm: Normal rate and regular rhythm.      Heart sounds: Normal heart sounds. " No murmur heard.  Pulmonary:      Effort: Pulmonary effort is normal. She is intubated.      Breath sounds: Wheezing present.   Abdominal:      General: Bowel sounds are normal.      Palpations: Abdomen is soft.      Comments: PEG in place   Musculoskeletal:      Comments: Ankylosed extremities. RUE edema with multiple open ulcerations.    Skin:     General: Skin is warm and dry.   Neurological:      Comments: Non verbal. Does not track or follow commands.        Significant Labs: Blood Culture:   Recent Labs   Lab 08/10/22  1719 08/22/22  1951 08/22/22 2010 08/23/22  2334 08/23/22  2335   LABBLOO No growth after 5 days. No Growth to date  No Growth to date Gram stain aer bottle: Gram positive cocci in clusters resembling Staph  Results called to and read back by: Samantha Barroso RN. 08/23/2022    21:35 No Growth to date No Growth to date     BMP:   Recent Labs   Lab 08/24/22  0354 08/24/22  0757   GLU 84  84 88     136 135*   K 4.9  4.9 4.6     103 103   CO2 25  25 25   BUN 29*  29* 27*   CREATININE 0.6  0.6 0.6   CALCIUM 10.2  10.2 10.0   MG 1.9  --      CBC:   Recent Labs   Lab 08/22/22 2003 08/22/22 2019 08/23/22  0504 08/24/22  0354   WBC 7.05  --  9.25 7.26   HGB 10.8*  --  10.0* 9.9*   HCT 34.6* 34* 32.4* 31.9*     --  346 304     Respiratory Culture:   Recent Labs   Lab 03/17/22  1337 07/29/22  0306 08/10/22  1728 08/23/22  0314 08/23/22  1502   GSRESP Rare WBC's  Many Gram positive cocci  Many Gram negative rods Many WBC's  <10 epithelial cells per low power field.  Few Gram negative rods <10 epithelial cells per low power field.  Moderate WBC's  Few Gram negative rods  Rare Gram positive cocci <10 epithelial cells per low power field.  Rare WBC's  Rare Gram positive cocci  Rare yeast  Moderate Gram negative rods Rare WBC's  Few Gram negative rods  Rare Gram positive cocci   RESPIRATORYC No S aureus or Pseudomonas isolated.  SERRATIA MARCESCENS  Many  *   PROVIDENCIA STUARTII  Many  Normal respiratory kenny also present  * PSEUDOMONAS AERUGINOSA  Moderate  Zerbaxa 1 S SOPHIA values are expressed in mcg/mL.  Testing performed by:  [facility]  [address] [place] [address]  Dr.Brian Loreto MD  *  PROTEUS MIRABILIS ESBL  Moderate  Results called to and read back by RN M3 ANKIT HOLDSWORTH  07/31/2022    09:37 JT  * KLEBSIELLA PNEUMONIAE ESBL  Moderate  *  ACINETOBACTER BAUMANNII   Moderate  Results called to and read back by Ester Warren RN-ED;  08/13/2022    08:33 CJD  * GRAM NEGATIVE CHRISTINA  Many  Identification and susceptibility pending  * GRAM NEGATIVE CHRISTINA  Many  Identification and susceptibility pending  *     Urine Culture:   Recent Labs   Lab 03/18/22  1010 03/20/22  0100 07/29/22  0545 08/10/22  1816 08/22/22  2053   LABURIN PSEUDOMONAS AERUGINOSA   10,000 - 49,999 cfu/ml  * No growth Multiple organisms isolated. None in predominance.  Repeat if  clinically necessary. CANDIDA TROPICALIS  10,000 - 49,999 cfu/ml  Treatment of asymptomatic candiduria is not recommended (except for   specific populations). Candida isolated in the urine typically   represents colonization. If an indwelling urinary catheter is present  it should be removed or replaced.  * ENTEROCOCCUS SPECIES  10,000 - 49,999 cfu/ml  Identification and susceptibility pending  *  YEAST   > 100,000 cfu/ml  Identification pending  *     Urine Studies:   Recent Labs   Lab 08/10/22  1816 08/22/22  2053 08/23/22  1415   COLORU Yellow   < > Yellow   APPEARANCEUA Clear   < > Hazy*   PHUR 7.0   < > 5.0   SPECGRAV >=1.030*   < > 1.015   PROTEINUA 2+*   < > 1+*   GLUCUA Negative   < > Negative   KETONESU Trace*   < > Trace*   BILIRUBINUA 1+*   < > Negative   OCCULTUA 3+*   < > 1+*   NITRITE Negative   < > Negative   UROBILINOGEN 4.0-6.0*  --   --    LEUKOCYTESUR Negative   < > 3+*   RBCUA 21*   < > 14*   WBCUA 75*   < > 57*   BACTERIA Negative   < > Few*   SQUAMEPITHEL 23   <  > 1   HYALINECASTS 100*   < > 0    < > = values in this interval not displayed.       Significant Imaging: I have reviewed all pertinent imaging results/findings within the past 24 hours.

## 2022-08-24 NOTE — PROGRESS NOTES
[facility] - Cardiac Medical ICU  Critical Care Medicine  Progress Note    Patient Name: Genna Felix  MRN: [id number]  Admission Date: 8/22/2022  Hospital Length of Stay: 2 days  Code Status: Full Code  Attending Provider: Tonny Aparicio MD  Primary Care Provider: Primary Doctor No   Principal Problem: Sepsis    Subjective:     HPI:  Genna Felix is a 61-year-old  female with a PMHx of asthma, GERD s/p cardiac arrest with anoxic brain injury in 2020, now in a persistent vegetative state with a trach/PEG and vent dependent who is a resident of [facility]. Pt was brought to the ED by EMS for right arm swelling. Upon arrival to the ED she was found to be hyperkalemic to 6.2 and hypothermic concerning for sepsis.     In the ED the patient was given 10 units of IV insulin and D10. UA + for bacteria, +3 Leuks, 44 WBC. Urine culture pending. Of note patient was hospitalized in the beginning of this month and respiratory culture was positible for Klebsiella ESBL, and Acinetobacter . It is unclear if she was treated for these bacteria. Respiratory cultures from 07/30 were positive for multidrug resistant psuedomonas and Proteus ESBL. The patient was treated with a course of Zerbaxa, duration unclear. Pt presented with RUQ cholecystostomy tube that was placed in 01/2022 at [place]. Hamilton dependent.     Critical care medicine was consulted for sepsis, trach w/vent dependency. She will be admitted to MICU. Started on Meropenem/Vanc. ID consulted.           Hospital/ICU Course:  DVT without evidence of clot.  CXR with coarse increased interstitial attenuation and patchy bibasilar infiltrates.  CTA without SBO, stable tube and decompressed gallbladder with cholelithiasis.  Bxl NGTD.  Hyperkalemic again thus shifted.  Given lasix and lokelma.  Repeat K 4.4  UA and Ucx taken before Hamilton exchange.  Hamilton replaced and repeat UA with 3+ leuks and 57 WBC.  ID onboard.  Technically has MEGAN as SCr bumped  from 0.3 to 0.6.  Started tube feeds.  Ongoing GOC with family. 1/2 Bxl with GPC.  Repeat cultures ordered.      Interval History/Significant Events: NAEO.  As above.    Review of Systems   Unable to perform ROS: Patient nonverbal   Objective:     Vital Signs (Most Recent):  Temp: 99.14 °F (37.3 °C) (08/24/22 1200)  Pulse: 109 (08/24/22 1200)  Resp: 16 (08/24/22 1200)  BP: (!) 145/77 (08/24/22 1200)  SpO2: 100 % (08/24/22 1200)   Vital Signs (24h Range):  Temp:  [95.72 °F (35.4 °C)-99.14 °F (37.3 °C)] 99.14 °F (37.3 °C)  Pulse:  [] 109  Resp:  [14-28] 16  SpO2:  [93 %-100 %] 100 %  BP: (102-145)/(62-81) 145/77   Weight: 77.4 kg (170 lb 10.2 oz)  Body mass index is 33.33 kg/m².      Intake/Output Summary (Last 24 hours) at 8/24/2022 1343  Last data filed at 8/24/2022 1243  Gross per 24 hour   Intake 1485.41 ml   Output 1655 ml   Net -169.59 ml       Physical Exam  Vitals and nursing note reviewed.   Constitutional:       Appearance: She is ill-appearing.   HENT:      Head: Atraumatic.      Right Ear: External ear normal.      Left Ear: External ear normal.      Nose: Nose normal. No rhinorrhea.      Mouth/Throat:      Mouth: Mucous membranes are dry.   Eyes:      Comments: L eye cataract, Roaming eye movements, unable to focus or track   Cardiovascular:      Rate and Rhythm: Normal rate and regular rhythm.      Pulses: Normal pulses.      Heart sounds: Normal heart sounds.   Pulmonary:      Comments: Trach/Vent dependent  Abdominal:      General: There is distension.   Genitourinary:     Comments: Hamilton catheter in place  Musculoskeletal:      Right lower leg: No edema.      Left lower leg: No edema.      Comments: Withdrawal to pain, upper extremity contracture  RUE swelling   Skin:     General: Skin is warm and dry.      Coloration: Skin is pale.   Neurological:      Mental Status: Mental status is at baseline.       Vents:  Vent Mode: A/C (08/24/22 0818)  Ventilator Initiated: Yes (08/22/22 2034)  Set Rate:  14 BPM (08/24/22 0818)  Vt Set: 400 mL (08/24/22 0818)  PEEP/CPAP: 5 cmH20 (08/24/22 0818)  Oxygen Concentration (%): 25 (08/24/22 1200)  Peak Airway Pressure: 46 cmH2O (08/24/22 0818)  Plateau Pressure: 31 cmH20 (08/24/22 0818)  Total Ve: 6.23 mL (08/24/22 0818)  Negative Inspiratory Force (cm H2O): 0 (08/24/22 0818)  F/VT Ratio<105 (RSBI): (!) 57.42 (08/24/22 0818)  Lines/Drains/Airways       Drain  Duration                  Biliary Tube RUQ -- days         Gastrostomy/Enterostomy LUQ -- days         Biliary Tube 01/13/22 223 days         Urethral Catheter 08/23/22 1445 Silicone 18 Fr. <1 day              Airway  Duration                  Surgical Airway Portex Cuffed -- days              Peripheral Intravenous Line  Duration                  Midline Catheter Insertion/Assessment  - Single Lumen 08/19/22 Left brachial vein 5 days         Peripheral IV - Single Lumen 08/22/22 1957 20 G Left Hand 1 day         Peripheral IV - Single Lumen 08/22/22 2015 22 G Left Forearm 1 day                  Significant Labs:    CBC/Anemia Profile:  Recent Labs   Lab 08/22/22 2003 08/22/22 2019 08/23/22  0504 08/24/22  0354   WBC 7.05  --  9.25 7.26   HGB 10.8*  --  10.0* 9.9*   HCT 34.6* 34* 32.4* 31.9*     --  346 304   MCV 84  --  86 87   RDW 18.6*  --  18.2* 18.3*        Chemistries:  Recent Labs   Lab 08/22/22 2003 08/22/22 2217 08/23/22  0504 08/23/22  1212 08/24/22  0354 08/24/22  0757 08/24/22  1241   *   < > 130*   < > 136  136 135* 136   K 6.1*   < > 6.2*   < > 4.9  4.9 4.6 4.4      < > 100   < > 103  103 103 103   CO2 21*   < > 21*   < > 25  25 25 26   BUN 38*   < > 38*   < > 29*  29* 27* 24*   CREATININE 0.5   < > 0.6   < > 0.6  0.6 0.6 0.6   CALCIUM 10.1   < > 9.6   < > 10.2  10.2 10.0 9.7   ALBUMIN 3.2*  --  3.2*  --  3.0*  --   --    PROT 9.8*  --  8.8*  --  9.4*  --   --    BILITOT 1.1*  --  1.6*  --  1.2*  --   --    ALKPHOS 201*  --  193*  --  199*  --   --    ALT 42  --  48*  --  40   --   --    AST 44*  --  51*  --  33  --   --    MG 2.1  --  2.0  --  1.9  --   --    PHOS  --   --  3.7  --  3.2  --   --     < > = values in this interval not displayed.       Blood Culture:   Recent Labs   Lab 08/22/22 2010 08/23/22  2334 08/23/22  2335   LABBLOO Gram stain aer bottle: Gram positive cocci in clusters resembling Staph  Results called to and read back by: Samantha Barroso RN. 08/23/2022    21:35 No Growth to date No Growth to date     All pertinent labs within the past 24 hours have been reviewed.    Significant Imaging:  I have reviewed all pertinent imaging results/findings within the past 24 hours.      ABG  Recent Labs   Lab 08/22/22 2018   PH 7.389   PO2 101*   PCO2 37.6   HCO3 22.7*   BE -2     Assessment/Plan:     Neuro  Persistent vegetative state  --Pt remains in persistent vegetative state 2/2 PEA and anoxic brain injury in 2020, withdraws to painful stimuli    ENT  Tracheostomy dependence  --Trach care per protocol    Pulmonary  Ventilator dependence  -See chronic hypoxic respiratory failure    Chronic respiratory failure with hypoxia  Patient with Hypoxic Respiratory failure which is Chronic.  she is on home oxygen at On ventilator LPM. Supplemental oxygen was provided and noted- Vent Mode: A/C  Oxygen Concentration (%):  [25-40] 25  Resp Rate Total:  [15 br/min-28 br/min] 17 br/min  Vt Set:  [400 mL] 400 mL  PEEP/CPAP:  [5 cmH20] 5 cmH20  Mean Airway Pressure:  [11 oiH43-30 cmH20] 11 cmH20.   Signs/symptoms of respiratory failure include- respiratory distress. Contributing diagnoses includes - Pneumonia Labs and images were reviewed. Patient Has recent ABG, which has been reviewed. Will treat underlying causes and adjust management of respiratory failure as follows-     CXR with coarse interstitial attenuation and bibasilar opacities    -Duonebs q 6 hrs  -VAP protocol in place  -Lung protective ventilation    ID  * Sepsis  This patient does have evidence of infective focus  My  overall impression is sepsis. Vital signs were reviewed and noted in progress note.  Antibiotics given-   Antibiotics (From admission, onward)            Start     Stop Route Frequency Ordered    08/25/22 0200  ciprofloxacin HCl 0.3 % ophthalmic solution 1 drop         08/30 0159 LEFT EYE Every 4 hours 08/23/22 0640    08/24/22 1130  vancomycin 1.25 g in dextrose 5% 250 mL IVPB (ready to mix)         -- IV Every 12 hours (non-standard times) 08/24/22 0356    08/24/22 0900  mupirocin 2 % ointment         08/29 0859 Nasl 2 times daily 08/24/22 0751    08/23/22 1200  meropenem (MERREM) 2 g in sodium chloride 0.9% 100 mL IVPB         -- IV Every 8 hours (non-standard times) 08/23/22 0930        Cultures were taken-   Microbiology Results (last 7 days)     Procedure Component Value Units Date/Time    Urine culture [[id number]]  (Abnormal) Collected: 08/22/22 2053    Order Status: Completed Specimen: Urine Updated: 08/24/22 1235     Urine Culture, Routine ENTEROCOCCUS SPECIES  10,000 - 49,999 cfu/ml  Identification and susceptibility pending        CANDIDA TROPICALIS  > 100,000 cfu/ml  Identification pending  Treatment of asymptomatic candiduria is not recommended (except for   specific populations). Candida isolated in the urine typically   represents colonization. If an indwelling urinary catheter is present  it should be removed or replaced.      Narrative:      Specimen Source->Urine    Culture, Respiratory with Gram Stain [[id number]]  (Abnormal) Collected: 08/23/22 0314    Order Status: Completed Specimen: Respiratory from Tracheal Aspirate Updated: 08/24/22 1000     Respiratory Culture GRAM NEGATIVE CHRISTINA  Many  Identification and susceptibility pending       Gram Stain (Respiratory) <10 epithelial cells per low power field.     Gram Stain (Respiratory) Rare WBC's     Gram Stain (Respiratory) Rare Gram positive cocci     Gram Stain (Respiratory) Rare yeast     Gram Stain (Respiratory) Moderate Gram negative rods     Culture, Respiratory with Gram Stain [[id number]]  (Abnormal) Collected: 08/23/22 1502    Order Status: Completed Specimen: Respiratory from Tracheal Aspirate Updated: 08/24/22 0956     Respiratory Culture GRAM NEGATIVE CHRISTINA  Many  Identification and susceptibility pending       Gram Stain (Respiratory) Rare WBC's     Gram Stain (Respiratory) Few Gram negative rods     Gram Stain (Respiratory) Rare Gram positive cocci    Blood culture [[id number]] Collected: 08/23/22 2335    Order Status: Completed Specimen: Blood from Peripheral, Forearm, Right Updated: 08/24/22 0915     Blood Culture, Routine No Growth to date    Blood culture [[id number]] Collected: 08/23/22 2334    Order Status: Completed Specimen: Blood from Peripheral, Antecubital, Right Updated: 08/24/22 0915     Blood Culture, Routine No Growth to date    Blood culture #2 **CANNOT BE ORDERED STAT** [[id number]] Collected: 08/22/22 2010    Order Status: Completed Specimen: Blood from Peripheral, Forearm, Left Updated: 08/24/22 0907     Blood Culture, Routine Gram stain aer bottle: Gram positive cocci in clusters resembling Staph      Results called to and read back by: Samantha Barroso RN. 08/23/2022        21:35    Blood culture #1 **CANNOT BE ORDERED STAT** [[id number]] Collected: 08/22/22 1951    Order Status: Completed Specimen: Blood from Peripheral, Hand, Left Updated: 08/23/22 2212     Blood Culture, Routine No Growth to date      No Growth to date    Urine culture [[id number]] Collected: 08/23/22 1415    Order Status: No result Specimen: Urine Updated: 08/23/22 1458        Latest lactate reviewed, they are-  Recent Labs   Lab 08/22/22 2003   LACTATE 0.6       Organ dysfunction indicated by Acute kidney injury  Source- Urine vs. Lung    Source control Achieved by- Antibiotics    Pt with hx of multiple drug resistant organisms including Proteus, Pseudomonas, Klebsiella and Acinetobacter. Py previously on Zerbexa, unclear duration.     Bxl with GPC in 1/2  bottles. Repeat cx ordered. Resp cx with GNR.  UCx with Enterococcus and Candida    --ID consulted   --Continue Meropenem, Vanc for now        Oncology  Anemia of chronic disease  Baseline around 9.    --Daily CBC    Endocrine  On tube feeding diet  See PEG    GI  Cholecystostomy care  [facility] at [facility]    --Monitor drain output  --Site Care    PEG (percutaneous endoscopic gastrostomy) status  CT abdo shows stable PEG tube    --PEG care per protocol  --PEG previously to suction, no SBO thus suction off  --Continue tube feeds    Orthopedic  Swelling of arm  Right arm swelling that hs been progressing according to nursing home. US negative for DVT.  Xray with soft tissue edema.    Improved    --Elevate RUE  --Lovenox for DVT prophylaxis    Wounds, multiple  --Wound care consulted  --Pressure reduction strategies, Turn q2         Critical Care Daily Checklist:     A: Awake: RASS Goal/Actual Goal:    Actual:     B: Spontaneous Breathing Trial Performed?     C: SAT & SBT Coordinated?  N/A                      D: Delirium: CAM-ICU     E: Early Mobility Performed? No   F: Feeding Goal:    Status:           Current Diet Order   Procedures    Tube feeds       AS: Analgesia/Sedation None   T: Thromboembolic Prophylaxis Lovenox   H: HOB > 300 Yes   U: Stress Ulcer Prophylaxis (if needed) Famotidine   G: Glucose Control 140-180   B: Bowel Function     I: Indwelling Catheter (Lines & Hamilton) Necessity Hamilton, Louisa tube, Trach   D: De-escalation of Antimicrobials/Pharmacotherapies Gui and vanco     Plan for the day/ETD F/U cultures     Code Status:  Family/Goals of Care: Full Code  Ongoing         Critical secondary to Patient has a condition that poses threat to life and bodily function: Sepsis, Chronic hypoxic respiratory failure, vent dependent       Critical care was time spent personally by me on the following activities: development of treatment plan with patient or surrogate and bedside  caregivers, discussions with consultants, evaluation of patient's response to treatment, examination of patient, ordering and performing treatments and interventions, ordering and review of laboratory studies, ordering and review of radiographic studies, pulse oximetry, re-evaluation of patient's condition. This critical care time did not overlap with that of any other provider or involve time for any procedures.     Joselo Jimenez MD  Critical Care Medicine  [facility] - Cardiac Medical [facility]

## 2022-08-24 NOTE — ASSESSMENT & PLAN NOTE
Concern for cellulitis due to skin excoriations. Complex fluid collection seen on imaging.   · Management as above.

## 2022-08-24 NOTE — ASSESSMENT & PLAN NOTE
Hypothermic and with tachycardia on admission with concern for sepsis as underlying etiology.  Potential sources of infection include the respiratory tract, intra-abdominal, urinary tract, and skin and soft tissue infection.  She does not have she does not have leukocytosis laboratory workup.  Chest x-ray shows bibasilar patchy opacities and CT of the abdomen shows persistent subsegmental opacities at the bilateral lung bases.  Changes have been present since at least 08/10/2022.  On on examination a right arm up to the bone shows changes concerning for a skin soft tissue infection.  Ultrasound to rule out DVT showed a small complex fluid collection in the soft tissues of the inner arm measuring 1.4 x 1 x 0.8 cm.    Remains hypothermic and on warming blanket. No leukocytosis noted. Blood cultures 1 of 4 for GPC. Respiratory culture with GNR pending identification.    · Continue meropenem and vancomycin for now.  · Pharmacy consult to manage vancomycin levels and therapeutic drug dosing.  · Will follow-up pending cultures.  · Would consider repeat urinalysis with reflex to culture after Hamilton catheter tubing is exchanged.

## 2022-08-24 NOTE — ASSESSMENT & PLAN NOTE
Right arm swelling that hs been progressing according to nursing home. US negative for DVT.  Xray with soft tissue edema.    Improved    --Elevate RUE  --Lovenox for DVT prophylaxis

## 2022-08-24 NOTE — SUBJECTIVE & OBJECTIVE
Interval History: ". Respiratory cultures with GNR. Blood cultures with GPC.     Review of Systems   Unable to perform ROS: Patient nonverbal   Objective:     Vital Signs (Most Recent):  Temp: 98.78 °F (37.1 °C) (08/24/22 1105)  Pulse: 109 (08/24/22 1105)  Resp: 17 (08/24/22 1105)  BP: 131/65 (08/24/22 1105)  SpO2: 100 % (08/24/22 1105) Vital Signs (24h Range):  Temp:  [95.72 °F (35.4 °C)-98.78 °F (37.1 °C)] 98.78 °F (37.1 °C)  Pulse:  [] 109  Resp:  [14-28] 17  SpO2:  [93 %-100 %] 100 %  BP: (100-131)/(62-81) 131/65     Weight: 77.4 kg (170 lb 10.2 oz)  Body mass index is 33.33 kg/m².    Estimated Creatinine Clearance: 92.9 mL/min (based on SCr of 0.6 mg/dL).    Physical Exam  Vitals and nursing note reviewed.   Constitutional:       Appearance: She is well-developed.      Interventions: She is intubated.   Eyes:      General: No scleral icterus.        Right eye: No discharge.         Left eye: No discharge.   Neck:      Comments: Tracheostomy in place.   Cardiovascular:      Rate and Rhythm: Normal rate and regular rhythm.      Heart sounds: Normal heart sounds. No murmur heard.  Pulmonary:      Effort: Pulmonary effort is normal. She is intubated.      Breath sounds: Wheezing present.   Abdominal:      General: Bowel sounds are normal.      Palpations: Abdomen is soft.      Comments: PEG in place   Musculoskeletal:      Comments: Ankylosed extremities. RUE edema with multiple open ulcerations.    Skin:     General: Skin is warm and dry.   Neurological:      Comments: Non verbal. Does not track or follow commands.        Significant Labs: Blood Culture:   Recent Labs   Lab 08/10/22  1719 08/22/22 1951 08/22/22 2010 08/23/22  2334 08/23/22  2335   LABBLOO No growth after 5 days. No Growth to date  No Growth to date Gram stain aer bottle: Gram positive cocci in clusters resembling Staph  Results called to and read back by: Samantha Barroso RN. 08/23/2022    21:35 No Growth to date No Growth to date      BMP:   Recent Labs   Lab 08/24/22  0354 08/24/22  0757   GLU 84  84 88     136 135*   K 4.9  4.9 4.6     103 103   CO2 25  25 25   BUN 29*  29* 27*   CREATININE 0.6  0.6 0.6   CALCIUM 10.2  10.2 10.0   MG 1.9  --      CBC:   Recent Labs   Lab 08/22/22 2003 08/22/22  2019 08/23/22  0504 08/24/22  0354   WBC 7.05  --  9.25 7.26   HGB 10.8*  --  10.0* 9.9*   HCT 34.6* 34* 32.4* 31.9*     --  346 304     Respiratory Culture:   Recent Labs   Lab 03/17/22  1337 07/29/22  0306 08/10/22  1728 08/23/22  0314 08/23/22  1502   GSRESP Rare WBC's  Many Gram positive cocci  Many Gram negative rods Many WBC's  <10 epithelial cells per low power field.  Few Gram negative rods <10 epithelial cells per low power field.  Moderate WBC's  Few Gram negative rods  Rare Gram positive cocci <10 epithelial cells per low power field.  Rare WBC's  Rare Gram positive cocci  Rare yeast  Moderate Gram negative rods Rare WBC's  Few Gram negative rods  Rare Gram positive cocci   RESPIRATORYC No S aureus or Pseudomonas isolated.  SERRATIA MARCESCENS  Many  *  PROVIDENCIA STUARTII  Many  Normal respiratory kenny also present  * PSEUDOMONAS AERUGINOSA  Moderate  Zerbaxa 1 S SOPHIA values are expressed in mcg/mL.  Testing performed by:  [facility]  [address] [place] [id number]  Dr.Brian Loreto MD  *  PROTEUS MIRABILIS ESBL  Moderate  Results called to and read back by RN M3 ANKIT HOLDSWORTH  07/31/2022    09:37 JT  * KLEBSIELLA PNEUMONIAE ESBL  Moderate  *  ACINETOBACTER BAUMANNII   Moderate  Results called to and read back by Ester Warren RN-ED;  08/13/2022    08:33 CJD  * GRAM NEGATIVE CHRISTINA  Many  Identification and susceptibility pending  * GRAM NEGATIVE CHRISTINA  Many  Identification and susceptibility pending  *     Urine Culture:   Recent Labs   Lab 03/18/22  1010 03/20/22  0100 07/29/22  0545 08/10/22  1816 08/22/22 2053   LABURIN PSEUDOMONAS AERUGINOSA   10,000 -  49,999 cfu/ml  * No growth Multiple organisms isolated. None in predominance.  Repeat if  clinically necessary. CANDIDA TROPICALIS  10,000 - 49,999 cfu/ml  Treatment of asymptomatic candiduria is not recommended (except for   specific populations). Candida isolated in the urine typically   represents colonization. If an indwelling urinary catheter is present  it should be removed or replaced.  * ENTEROCOCCUS SPECIES  10,000 - 49,999 cfu/ml  Identification and susceptibility pending  *  YEAST   > 100,000 cfu/ml  Identification pending  *     Urine Studies:   Recent Labs   Lab 08/10/22  1816 08/22/22  2053 08/23/22  1415   COLORU Yellow   < > Yellow   APPEARANCEUA Clear   < > Hazy*   PHUR 7.0   < > 5.0   SPECGRAV >=1.030*   < > 1.015   PROTEINUA 2+*   < > 1+*   GLUCUA Negative   < > Negative   KETONESU Trace*   < > Trace*   BILIRUBINUA 1+*   < > Negative   OCCULTUA 3+*   < > 1+*   NITRITE Negative   < > Negative   UROBILINOGEN 4.0-6.0*  --   --    LEUKOCYTESUR Negative   < > 3+*   RBCUA 21*   < > 14*   WBCUA 75*   < > 57*   BACTERIA Negative   < > Few*   SQUAMEPITHEL 23   < > 1   HYALINECASTS 100*   < > 0    < > = values in this interval not displayed.       Significant Imaging: I have reviewed all pertinent imaging results/findings within the past 24 hours.

## 2022-08-24 NOTE — HPI
"A 61-year-old woman with anoxic brain injury in 2020, a persistent vegetative state, ventilator dependent with tracheostomy plus PEG, acute cholecystitis status post IR drainage in January, and recent inpatient admission at [facility] for possible aspiration pneumonia with MDRO organisms who was brought in to [facility] for evaluation from her nursing home due to right arm swelling.  On evaluation she was noted to be hypothermic and laboratory workup revealed hyperkalemia.  Urinalysis revealed pyuria however this was performed from an old Hamilton catheter.  She was admitted to the ICU for management of sepsis suspected to be from pneumonia versus UTI.  She was started on empiric meropenem and vancomycin.    Of note, the patient was treated with Zerbaxa and transition to Cipro on discharge.    Infectious DIseases consulted for "Started Meropenem due to hx of Klebsiella ESBL and Acinetobacter"            "

## 2022-08-24 NOTE — PROGRESS NOTES
08/24/22 1300   WOCN Assessment   WOCN Total Time (mins) 30   Visit Date 08/24/22   Visit Time 1300   Consult Type New   WOCN Speciality Wound   Intervention assessed;changed;applied;chart review;coordination of care;orders        Wound 01/04/22 0135 Venous Ulcer Right posterior Elbow   Date First Assessed/Time First Assessed: 01/04/22 0135   Pre-existing: Yes  Primary Wound Type: Venous Ulcer  Side: Right  Orientation: posterior  Location: Elbow   Wound Image    Drainage Amount Scant   Red (%), Wound Tissue Color 100 %   Wound Length (cm) 4 cm   Wound Width (cm) 3 cm   Wound Depth (cm) 0.1 cm   Wound Volume (cm^3) 1.2 cm^3   Wound Surface Area (cm^2) 12 cm^2        Wound 08/23/22 0715 Right posterior Hand   Date First Assessed/Time First Assessed: 08/23/22 0715   Pre-existing: Yes  Side: Right  Orientation: posterior  Location: Hand   Wound Image    Drainage Amount Small   Yellow (%), Wound Tissue Color 100 %   Wound Length (cm) 8.5 cm   Wound Width (cm) 11 cm   Wound Depth (cm) 0.1 cm   Wound Volume (cm^3) 9.35 cm^3   Wound Surface Area (cm^2) 93.5 cm^2        Wound 08/23/22 0715 Left posterior Heel   Date First Assessed/Time First Assessed: 08/23/22 0715   Pre-existing: Yes  Side: Left  Orientation: posterior  Location: Heel   Wound Image    Drainage Amount None   Black (%), Wound Tissue Color 100 %   Wound Length (cm) 4.5 cm   Wound Width (cm) 4 cm   Wound Depth (cm) 0.1 cm   Wound Volume (cm^3) 1.8 cm^3   Wound Surface Area (cm^2) 18 cm^2        Gastrostomy/Enterostomy LUQ   No placement date or time found.   Location: LUQ   Tube Feeding Intake (mL) 30     Patient consult for wound care to left heel,right hand and right elbow. Patient's left heel with a darken area, a heel foam dressing and heel boots are in place at all times. To the patient right hand with 100% eschar this site is cleanse with normal saline apply santyl then xeroform cover with dry clean dressing every other day and prn for saturation. The  right elbow is cleanse with normal saline apply Aquacel Ag cover with dry clean dressing every other day and prn for saturation.

## 2022-08-25 LAB
ALBUMIN SERPL BCP-MCNC: 2.9 G/DL (ref 3.5–5.2)
ALP SERPL-CCNC: 203 U/L (ref 55–135)
ALT SERPL W/O P-5'-P-CCNC: 35 U/L (ref 10–44)
ANION GAP SERPL CALC-SCNC: 7 MMOL/L (ref 8–16)
AST SERPL-CCNC: 27 U/L (ref 10–40)
BACTERIA BLD CULT: ABNORMAL
BACTERIA UR CULT: ABNORMAL
BACTERIA UR CULT: ABNORMAL
BASOPHILS # BLD AUTO: 0.03 K/UL (ref 0–0.2)
BASOPHILS NFR BLD: 0.5 % (ref 0–1.9)
BILIRUB SERPL-MCNC: 0.9 MG/DL (ref 0.1–1)
BUN SERPL-MCNC: 26 MG/DL (ref 6–20)
CALCIUM SERPL-MCNC: 9.9 MG/DL (ref 8.7–10.5)
CHLORIDE SERPL-SCNC: 104 MMOL/L (ref 95–110)
CO2 SERPL-SCNC: 26 MMOL/L (ref 23–29)
CREAT SERPL-MCNC: 0.6 MG/DL (ref 0.5–1.4)
DIFFERENTIAL METHOD: ABNORMAL
EOSINOPHIL # BLD AUTO: 0.3 K/UL (ref 0–0.5)
EOSINOPHIL NFR BLD: 5.1 % (ref 0–8)
ERYTHROCYTE [DISTWIDTH] IN BLOOD BY AUTOMATED COUNT: 18.8 % (ref 11.5–14.5)
EST. GFR  (NO RACE VARIABLE): >60 ML/MIN/1.73 M^2
GLUCOSE SERPL-MCNC: 114 MG/DL (ref 70–110)
HCT VFR BLD AUTO: 31.4 % (ref 37–48.5)
HGB BLD-MCNC: 9.8 G/DL (ref 12–16)
IMM GRANULOCYTES # BLD AUTO: 0.02 K/UL (ref 0–0.04)
IMM GRANULOCYTES NFR BLD AUTO: 0.3 % (ref 0–0.5)
LYMPHOCYTES # BLD AUTO: 1.3 K/UL (ref 1–4.8)
LYMPHOCYTES NFR BLD: 19.8 % (ref 18–48)
MAGNESIUM SERPL-MCNC: 1.9 MG/DL (ref 1.6–2.6)
MCH RBC QN AUTO: 27.3 PG (ref 27–31)
MCHC RBC AUTO-ENTMCNC: 31.2 G/DL (ref 32–36)
MCV RBC AUTO: 88 FL (ref 82–98)
MONOCYTES # BLD AUTO: 0.8 K/UL (ref 0.3–1)
MONOCYTES NFR BLD: 12.4 % (ref 4–15)
NEUTROPHILS # BLD AUTO: 4.1 K/UL (ref 1.8–7.7)
NEUTROPHILS NFR BLD: 61.9 % (ref 38–73)
NRBC BLD-RTO: 0 /100 WBC
PHOSPHATE SERPL-MCNC: 2.3 MG/DL (ref 2.7–4.5)
PLATELET # BLD AUTO: 282 K/UL (ref 150–450)
PMV BLD AUTO: 11.2 FL (ref 9.2–12.9)
POCT GLUCOSE: 110 MG/DL (ref 70–110)
POCT GLUCOSE: 249 MG/DL (ref 70–110)
POCT GLUCOSE: 92 MG/DL (ref 70–110)
POTASSIUM SERPL-SCNC: 4.5 MMOL/L (ref 3.5–5.1)
PROT SERPL-MCNC: 9.1 G/DL (ref 6–8.4)
RBC # BLD AUTO: 3.59 M/UL (ref 4–5.4)
SODIUM SERPL-SCNC: 137 MMOL/L (ref 136–145)
VANCOMYCIN TROUGH SERPL-MCNC: 42 UG/ML (ref 10–22)
WBC # BLD AUTO: 6.63 K/UL (ref 3.9–12.7)

## 2022-08-25 PROCEDURE — 99900035 HC TECH TIME PER 15 MIN (STAT)

## 2022-08-25 PROCEDURE — 94003 VENT MGMT INPAT SUBQ DAY: CPT

## 2022-08-25 PROCEDURE — 99900026 HC AIRWAY MAINTENANCE (STAT)

## 2022-08-25 PROCEDURE — 20000000 HC ICU ROOM

## 2022-08-25 PROCEDURE — 27000221 HC OXYGEN, UP TO 24 HOURS

## 2022-08-25 PROCEDURE — 99233 PR SUBSEQUENT HOSPITAL CARE,LEVL III: ICD-10-PCS | Mod: ,,, | Performed by: INTERNAL MEDICINE

## 2022-08-25 PROCEDURE — 99233 SBSQ HOSP IP/OBS HIGH 50: CPT | Mod: GC,,, | Performed by: INTERNAL MEDICINE

## 2022-08-25 PROCEDURE — 25000003 PHARM REV CODE 250: Performed by: INTERNAL MEDICINE

## 2022-08-25 PROCEDURE — 83735 ASSAY OF MAGNESIUM: CPT | Performed by: NURSE PRACTITIONER

## 2022-08-25 PROCEDURE — 99233 PR SUBSEQUENT HOSPITAL CARE,LEVL III: ICD-10-PCS | Mod: GC,,, | Performed by: INTERNAL MEDICINE

## 2022-08-25 PROCEDURE — 99232 PR SUBSEQUENT HOSPITAL CARE,LEVL II: ICD-10-PCS | Mod: ,,, | Performed by: INTERNAL MEDICINE

## 2022-08-25 PROCEDURE — 63600175 PHARM REV CODE 636 W HCPCS: Performed by: NURSE PRACTITIONER

## 2022-08-25 PROCEDURE — 27000207 HC ISOLATION

## 2022-08-25 PROCEDURE — 99232 SBSQ HOSP IP/OBS MODERATE 35: CPT | Mod: ,,, | Performed by: INTERNAL MEDICINE

## 2022-08-25 PROCEDURE — 80202 ASSAY OF VANCOMYCIN: CPT | Performed by: INTERNAL MEDICINE

## 2022-08-25 PROCEDURE — 80053 COMPREHEN METABOLIC PANEL: CPT | Performed by: NURSE PRACTITIONER

## 2022-08-25 PROCEDURE — 27200966 HC CLOSED SUCTION SYSTEM

## 2022-08-25 PROCEDURE — 63600175 PHARM REV CODE 636 W HCPCS: Performed by: INTERNAL MEDICINE

## 2022-08-25 PROCEDURE — 84100 ASSAY OF PHOSPHORUS: CPT | Performed by: NURSE PRACTITIONER

## 2022-08-25 PROCEDURE — 85025 COMPLETE CBC W/AUTO DIFF WBC: CPT | Performed by: NURSE PRACTITIONER

## 2022-08-25 PROCEDURE — 99233 SBSQ HOSP IP/OBS HIGH 50: CPT | Mod: ,,, | Performed by: INTERNAL MEDICINE

## 2022-08-25 PROCEDURE — 94761 N-INVAS EAR/PLS OXIMETRY MLT: CPT

## 2022-08-25 PROCEDURE — 25000003 PHARM REV CODE 250: Performed by: NURSE PRACTITIONER

## 2022-08-25 RX ADMIN — MEROPENEM 2 G: 1 INJECTION INTRAVENOUS at 04:08

## 2022-08-25 RX ADMIN — MEROPENEM 2 G: 1 INJECTION INTRAVENOUS at 08:08

## 2022-08-25 RX ADMIN — FAMOTIDINE 20 MG: 20 TABLET ORAL at 08:08

## 2022-08-25 RX ADMIN — CIPROFLOXACIN 1 DROP: 3 SOLUTION OPHTHALMIC at 10:08

## 2022-08-25 RX ADMIN — CIPROFLOXACIN 1 DROP: 3 SOLUTION OPHTHALMIC at 06:08

## 2022-08-25 RX ADMIN — COLLAGENASE SANTYL: 250 OINTMENT TOPICAL at 09:08

## 2022-08-25 RX ADMIN — HYPROMELLOSE 2910 1 DROP: 5 SOLUTION OPHTHALMIC at 08:08

## 2022-08-25 RX ADMIN — CIPROFLOXACIN 1 DROP: 3 SOLUTION OPHTHALMIC at 02:08

## 2022-08-25 RX ADMIN — CIPROFLOXACIN 1 DROP: 3 SOLUTION OPHTHALMIC at 05:08

## 2022-08-25 RX ADMIN — MUPIROCIN: 20 OINTMENT TOPICAL at 08:08

## 2022-08-25 RX ADMIN — SENNOSIDES 5 ML: 8.8 SYRUP ORAL at 08:08

## 2022-08-25 RX ADMIN — MEROPENEM 2 G: 1 INJECTION INTRAVENOUS at 11:08

## 2022-08-25 RX ADMIN — HYPROMELLOSE 2910 1 DROP: 5 SOLUTION OPHTHALMIC at 03:08

## 2022-08-25 RX ADMIN — ENOXAPARIN SODIUM 40 MG: 40 INJECTION SUBCUTANEOUS at 04:08

## 2022-08-25 RX ADMIN — CIPROFLOXACIN 1 DROP: 3 SOLUTION OPHTHALMIC at 01:08

## 2022-08-25 NOTE — PLAN OF CARE
[facility] DAILY GOALS       A: Awake    RASS: Goal -    Actual -     Restraint necessity:    B: Breathe   SBT: NA   C: Coordinate A & B, analgesics/sedatives   Pain: managed    SAT: NA  D: Delirium   CAM-ICU: Overall CAM-ICU: Positive  E: Early(intubated/ Progressive (non-intubated) Mobility   MOVE Screen: Fail   Activity: Activity Management: Rolling - L1  FAS: Feeding/Nutrition   Diet order: Diet/Nutrition Received: NPO, tube feeding,    T: Thrombus   DVT prophylaxis: VTE Required Core Measure: Pharmacological prophylaxis initiated/maintained  H: HOB Elevation   Head of Bed (HOB) Positioning: HOB elevated  U: Ulcer Prophylaxis   GI: yes  G: Glucose control   managed Glycemic Management: blood glucose monitored  S: Skin   Bathing/Skin Care: bath, complete, linen changed, electrode patches/site rotation, dressed/undressed  Device Skin Pressure Protection: absorbent pad utilized/changed, adhesive use limited, skin-to-device areas padded  Pressure Reduction Devices: specialty bed utilized, heel offloading device utilized, positioning supports utilized  Pressure Reduction Techniques: weight shift assistance provided  Skin Protection: incontinence pads utilized  B: Bowel Function   diarrhea   I: Indwelling Catheters   Hamilton necessity:      Urethral Catheter 08/23/22 1445 Silicone 18 Fr.-Reason for Continuing Urinary Catheterization: Critically ill in ICU and requiring hourly monitoring of intake/output  [REMOVED]      Urethral Catheter 08/02/22 0515 Non-latex 18 Fr.-Reason for Continuing Urinary Catheterization: Chronic Indwelling Urinary Catheter on Admission   CVC necessity: Yes  D: De-escalation Antibiotics   Yes    Family/Goals of care/Code Status   Code Status: DNR    24H Vital Sign Range  Temp:  [96 °F (35.6 °C)-97.1 °F (36.2 °C)]   Pulse:  [71-91]   Resp:  [14-40]   BP: (108-145)/(60-99)   SpO2:  [93 %-100 %]      Shift Events   No acute events throughout shift. Administered medications per orders, pt tolerated well.  Strict I and O maintained. Pt turned q2h. Plan to transfer back to NH.    VS and assessment per flow sheet, patient progressing towards goals as tolerated, plan of care reviewed with Genna, all concerns addressed, will continue to monitor.    Emi Solomon, RN, BSN

## 2022-08-25 NOTE — SUBJECTIVE & OBJECTIVE
Interval History/Significant Events: NAEON. Cultures thought to be contaminants per ID. However, shold treat for skin/soft tissue infection of RUE    Review of Systems  Objective:     Vital Signs (Most Recent):  Temp: 96.5 °F (35.8 °C) (08/25/22 1505)  Pulse: 74 (08/25/22 1543)  Resp: 14 (08/25/22 1543)  BP: (!) 145/99 (08/25/22 1505)  SpO2: 98 % (08/25/22 1543)   Vital Signs (24h Range):  Temp:  [96 °F (35.6 °C)-97.1 °F (36.2 °C)] 96.5 °F (35.8 °C)  Pulse:  [71-98] 74  Resp:  [14-40] 14  SpO2:  [93 %-100 %] 98 %  BP: (108-145)/(60-99) 145/99   Weight: 77.6 kg (171 lb 1.2 oz)  Body mass index is 33.41 kg/m².      Intake/Output Summary (Last 24 hours) at 8/25/2022 1622  Last data filed at 8/25/2022 1500  Gross per 24 hour   Intake 1137.3 ml   Output 495 ml   Net 642.3 ml       Physical Exam  Vitals and nursing note reviewed.   Constitutional:       General: She is not in acute distress.     Appearance: She is ill-appearing (chronically). She is not toxic-appearing or diaphoretic.   Cardiovascular:      Comments: Unable to auscultate heart sounds due to loud ventilator noises  Pulmonary:      Effort: No respiratory distress.      Breath sounds: No wheezing.   Abdominal:      Palpations: Abdomen is soft.      Tenderness: There is no guarding.   Musculoskeletal:      Right lower leg: No edema.      Left lower leg: No edema.   Skin:     General: Skin is warm and dry.   Neurological:      Mental Status: She is alert. Mental status is at baseline.      Comments: Responds to uncomfortable stimuli by moving away, does not speak or converse, roving eyes   Psychiatric:      Comments: Unable to assess 2/2 anoxic brain injury       Vents:  Vent Mode: A/C (08/25/22 1543)  Ventilator Initiated: Yes (08/22/22 2034)  Set Rate: 14 BPM (08/25/22 1543)  Vt Set: 400 mL (08/25/22 1543)  PEEP/CPAP: 5 cmH20 (08/25/22 1543)  Oxygen Concentration (%): 25 (08/25/22 1543)  Peak Airway Pressure: 38 cmH2O (08/25/22 1543)  Plateau Pressure: 31  cmH20 (08/25/22 1543)  Total Ve: 5.5 mL (08/25/22 1543)  Negative Inspiratory Force (cm H2O): 0 (08/25/22 1543)  F/VT Ratio<105 (RSBI): (!) 35 (08/25/22 1543)  Lines/Drains/Airways       Drain  Duration                  Biliary Tube RUQ -- days         Gastrostomy/Enterostomy LUQ -- days         Biliary Tube 01/13/22 224 days         Urethral Catheter 08/23/22 1445 Silicone 18 Fr. 2 days              Airway  Duration                  Surgical Airway Portex Cuffed -- days              Peripheral Intravenous Line  Duration                  Midline Catheter Insertion/Assessment  - Single Lumen 08/19/22 Left brachial vein 6 days         Peripheral IV - Single Lumen 08/22/22 1957 20 G Left Hand 2 days         Peripheral IV - Single Lumen 08/22/22 2015 22 G Left Forearm 2 days                  Significant Labs:    CBC/Anemia Profile:  Recent Labs   Lab 08/24/22  0354 08/25/22  0414   WBC 7.26 6.63   HGB 9.9* 9.8*   HCT 31.9* 31.4*    282   MCV 87 88   RDW 18.3* 18.8*        Chemistries:  Recent Labs   Lab 08/24/22  0354 08/24/22  0757 08/24/22  1241 08/24/22  1633 08/25/22  0414     136   < > 136 138 137   K 4.9  4.9   < > 4.4 4.6 4.5     103   < > 103 105 104   CO2 25  25   < > 26 21* 26   BUN 29*  29*   < > 24* 23* 26*   CREATININE 0.6  0.6   < > 0.6 0.6 0.6   CALCIUM 10.2  10.2   < > 9.7 9.9 9.9   ALBUMIN 3.0*  --   --   --  2.9*   PROT 9.4*  --   --   --  9.1*   BILITOT 1.2*  --   --   --  0.9   ALKPHOS 199*  --   --   --  203*   ALT 40  --   --   --  35   AST 33  --   --   --  27   MG 1.9  --   --   --  1.9   PHOS 3.2  --   --   --  2.3*    < > = values in this interval not displayed.       All pertinent labs within the past 24 hours have been reviewed.    Significant Imaging:  I have reviewed all pertinent imaging results/findings within the past 24 hours.

## 2022-08-25 NOTE — PROGRESS NOTES
[facility] - Cardiac Medical ICU  Critical Care Medicine  Progress Note    Patient Name: Genna Felix  MRN: [id number]  Admission Date: 8/22/2022  Hospital Length of Stay: 3 days  Code Status: DNR  Attending Provider: Tonny Aparicio MD  Primary Care Provider: Primary Doctor No   Principal Problem: Sepsis    Subjective:     HPI:  Genna Felix is a 61-year-old  female with a PMHx of asthma, GERD s/p cardiac arrest with anoxic brain injury in 2020, now in a persistent vegetative state with a trach/PEG and vent dependent who is a resident of [facility]. Pt was brought to the ED by EMS for right arm swelling. Upon arrival to the ED she was found to be hyperkalemic to 6.2 and hypothermic concerning for sepsis.     In the ED the patient was given 10 units of IV insulin and D10. UA + for bacteria, +3 Leuks, 44 WBC. Urine culture pending. Of note patient was hospitalized in the beginning of this month and respiratory culture was positible for Klebsiella ESBL, and Acinetobacter . It is unclear if she was treated for these bacteria. Respiratory cultures from 07/30 were positive for multidrug resistant psuedomonas and Proteus ESBL. The patient was treated with a course of Zerbaxa, duration unclear. Pt presented with RUQ cholecystostomy tube that was placed in 01/2022 at [place]. Hamilton dependent.     Critical care medicine was consulted for sepsis, trach w/vent dependency. She will be admitted to MICU. Started on Meropenem/Vanc. ID consulted.           Hospital/ICU Course:  DVT without evidence of clot.  CXR with coarse increased interstitial attenuation and patchy bibasilar infiltrates.  CTA without SBO, stable tube and decompressed gallbladder with cholelithiasis.  Bxl NGTD.  Hyperkalemic again thus shifted.  Given lasix and lokelma.  Repeat K 4.4  UA and Ucx taken before Hamilton exchange.  Hamilton replaced and repeat UA with 3+ leuks and 57 WBC.  ID onboard.  Technically has MEGAN as SCr bumped from  0.3 to 0.6.  Started tube feeds.  Ongoing GOC with family. 1/2 Bxl with GPC.  Repeat cultures ordered. Per consultation with ID, blood, urine, and sputum cultures represent contaminants. However, do believe the patient should be treated for RUE cellulitis with doxycycline (which she can discharge on). CM to assist with transfer back to NH.       Interval History/Significant Events: NAEON. Cultures thought to be contaminants per ID. However, shold treat for skin/soft tissue infection of RUE    Review of Systems  Objective:     Vital Signs (Most Recent):  Temp: 96.5 °F (35.8 °C) (08/25/22 1505)  Pulse: 74 (08/25/22 1543)  Resp: 14 (08/25/22 1543)  BP: (!) 145/99 (08/25/22 1505)  SpO2: 98 % (08/25/22 1543)   Vital Signs (24h Range):  Temp:  [96 °F (35.6 °C)-97.1 °F (36.2 °C)] 96.5 °F (35.8 °C)  Pulse:  [71-98] 74  Resp:  [14-40] 14  SpO2:  [93 %-100 %] 98 %  BP: (108-145)/(60-99) 145/99   Weight: 77.6 kg (171 lb 1.2 oz)  Body mass index is 33.41 kg/m².      Intake/Output Summary (Last 24 hours) at 8/25/2022 1622  Last data filed at 8/25/2022 1500  Gross per 24 hour   Intake 1137.3 ml   Output 495 ml   Net 642.3 ml       Physical Exam  Vitals and nursing note reviewed.   Constitutional:       General: She is not in acute distress.     Appearance: She is ill-appearing (chronically). She is not toxic-appearing or diaphoretic.   Cardiovascular:      Comments: Unable to auscultate heart sounds due to loud ventilator noises  Pulmonary:      Effort: No respiratory distress.      Breath sounds: No wheezing.   Abdominal:      Palpations: Abdomen is soft.      Tenderness: There is no guarding.   Musculoskeletal:      Right lower leg: No edema.      Left lower leg: No edema.   Skin:     General: Skin is warm and dry.   Neurological:      Mental Status: She is alert. Mental status is at baseline.      Comments: Responds to uncomfortable stimuli by moving away, does not speak or converse, roving eyes   Psychiatric:      Comments:  Unable to assess 2/2 anoxic brain injury       Vents:  Vent Mode: A/C (08/25/22 1543)  Ventilator Initiated: Yes (08/22/22 2034)  Set Rate: 14 BPM (08/25/22 1543)  Vt Set: 400 mL (08/25/22 1543)  PEEP/CPAP: 5 cmH20 (08/25/22 1543)  Oxygen Concentration (%): 25 (08/25/22 1543)  Peak Airway Pressure: 38 cmH2O (08/25/22 1543)  Plateau Pressure: 31 cmH20 (08/25/22 1543)  Total Ve: 5.5 mL (08/25/22 1543)  Negative Inspiratory Force (cm H2O): 0 (08/25/22 1543)  F/VT Ratio<105 (RSBI): (!) 35 (08/25/22 1543)  Lines/Drains/Airways       Drain  Duration                  Biliary Tube RUQ -- days         Gastrostomy/Enterostomy LUQ -- days         Biliary Tube 01/13/22 224 days         Urethral Catheter 08/23/22 1445 Silicone 18 Fr. 2 days              Airway  Duration                  Surgical Airway Portex Cuffed -- days              Peripheral Intravenous Line  Duration                  Midline Catheter Insertion/Assessment  - Single Lumen 08/19/22 Left brachial vein 6 days         Peripheral IV - Single Lumen 08/22/22 1957 20 G Left Hand 2 days         Peripheral IV - Single Lumen 08/22/22 2015 22 G Left Forearm 2 days                  Significant Labs:    CBC/Anemia Profile:  Recent Labs   Lab 08/24/22  0354 08/25/22  0414   WBC 7.26 6.63   HGB 9.9* 9.8*   HCT 31.9* 31.4*    282   MCV 87 88   RDW 18.3* 18.8*        Chemistries:  Recent Labs   Lab 08/24/22  0354 08/24/22  0757 08/24/22  1241 08/24/22  1633 08/25/22  0414     136   < > 136 138 137   K 4.9  4.9   < > 4.4 4.6 4.5     103   < > 103 105 104   CO2 25  25   < > 26 21* 26   BUN 29*  29*   < > 24* 23* 26*   CREATININE 0.6  0.6   < > 0.6 0.6 0.6   CALCIUM 10.2  10.2   < > 9.7 9.9 9.9   ALBUMIN 3.0*  --   --   --  2.9*   PROT 9.4*  --   --   --  9.1*   BILITOT 1.2*  --   --   --  0.9   ALKPHOS 199*  --   --   --  203*   ALT 40  --   --   --  35   AST 33  --   --   --  27   MG 1.9  --   --   --  1.9   PHOS 3.2  --   --   --  2.3*    < > =  values in this interval not displayed.       All pertinent labs within the past 24 hours have been reviewed.    Significant Imaging:  I have reviewed all pertinent imaging results/findings within the past 24 hours.      ABG  Recent Labs   Lab 08/22/22 2018   PH 7.389   PO2 101*   PCO2 37.6   HCO3 22.7*   BE -2     Assessment/Plan:     Neuro  Persistent vegetative state  --Pt remains in persistent vegetative state 2/2 PEA and anoxic brain injury in 2020, withdraws to painful stimuli    ENT  Tracheostomy dependence  --Trach care per protocol    Pulmonary  Ventilator dependence  -See chronic hypoxic respiratory failure    Chronic respiratory failure with hypoxia  Patient with Hypoxic Respiratory failure which is Chronic.  she is on home oxygen at On ventilator LPM. Supplemental oxygen was provided and noted- Vent Mode: A/C  Oxygen Concentration (%):  [25] 25  Resp Rate Total:  [14 br/min-23 br/min] 14 br/min  Vt Set:  [400 mL] 400 mL  PEEP/CPAP:  [5 cmH20] 5 cmH20  Mean Airway Pressure:  [10 mlJ21-96 cmH20] 12 cmH20.   Signs/symptoms of respiratory failure include- respiratory distress. Contributing diagnoses includes - Pneumonia Labs and images were reviewed. Patient Has recent ABG, which has been reviewed. Will treat underlying causes and adjust management of respiratory failure as follows-     CXR with coarse interstitial attenuation and bibasilar opacities    -Duonebs q 6 hrs  -No longer suspect VAP; respiratory culture represents contaminant per ID  -Lung protective ventilation    ID  * Sepsis  This patient does have evidence of infective focus  My overall impression is sepsis. Vital signs were reviewed and noted in progress note.  Antibiotics given-   Antibiotics (From admission, onward)            Start     Stop Route Frequency Ordered    08/25/22 1200  vancomycin - pharmacy to dose         -- IV pharmacy to manage frequency 08/25/22 1100    08/25/22 0200  ciprofloxacin HCl 0.3 % ophthalmic solution 1 drop          08/30 0159 LEFT EYE Every 4 hours 08/23/22 0640    08/24/22 0900  mupirocin 2 % ointment         08/29 0859 Nasl 2 times daily 08/24/22 0751    08/23/22 1200  meropenem (MERREM) 2 g in sodium chloride 0.9% 100 mL IVPB         -- IV Every 8 hours (non-standard times) 08/23/22 0930        Cultures were taken-   Microbiology Results (last 7 days)     Procedure Component Value Units Date/Time    Blood culture #2 **CANNOT BE ORDERED STAT** [[id number]]  (Abnormal) Collected: 08/22/22 2010    Order Status: Completed Specimen: Blood from Peripheral, Forearm, Left Updated: 08/25/22 1423     Blood Culture, Routine Gram stain aer bottle: Gram positive cocci in clusters resembling Staph      Results called to and read back by: Samantha Barroso RN. 08/23/2022        21:35      COAGULASE-NEGATIVE STAPHYLOCOCCUS SPECIES  Organism is a probable contaminant      Culture, Respiratory with Gram Stain [[id number]]  (Abnormal)  (Susceptibility) Collected: 08/23/22 0314    Order Status: Completed Specimen: Respiratory from Tracheal Aspirate Updated: 08/25/22 1232     Respiratory Culture No S aureus isolated.      GRAM NEGATIVE CHRISTINA  Many  Identification and susceptibility pending        PSEUDOMONAS AERUGINOSA   Many       Gram Stain (Respiratory) <10 epithelial cells per low power field.     Gram Stain (Respiratory) Rare WBC's     Gram Stain (Respiratory) Rare Gram positive cocci     Gram Stain (Respiratory) Rare yeast     Gram Stain (Respiratory) Moderate Gram negative rods    Urine culture [[id number]]  (Abnormal)  (Susceptibility) Collected: 08/22/22 2053    Order Status: Completed Specimen: Urine Updated: 08/25/22 1151     Urine Culture, Routine ENTEROCOCCUS FAECIUM VRE  10,000 - 49,999 cfu/ml        CANDIDA TROPICALIS  > 100,000 cfu/ml  Treatment of asymptomatic candiduria is not recommended (except for   specific populations). Candida isolated in the urine typically   represents colonization. If an indwelling urinary catheter  is present  it should be removed or replaced.      Narrative:      Specimen Source->Urine    Culture, Respiratory with Gram Stain [[id number]]  (Abnormal) Collected: 08/23/22 1502    Order Status: Completed Specimen: Respiratory from Tracheal Aspirate Updated: 08/25/22 0907     Respiratory Culture No S aureus isolated.      GRAM NEGATIVE CHRISTINA  Many  Identification and susceptibility pending       Gram Stain (Respiratory) Rare WBC's     Gram Stain (Respiratory) Few Gram negative rods     Gram Stain (Respiratory) Rare Gram positive cocci    Blood culture [[id number]] Collected: 08/23/22 2335    Order Status: Completed Specimen: Blood from Peripheral, Forearm, Right Updated: 08/25/22 0613     Blood Culture, Routine No Growth to date      No Growth to date    Blood culture [[id number]] Collected: 08/23/22 2334    Order Status: Completed Specimen: Blood from Peripheral, Antecubital, Right Updated: 08/25/22 0613     Blood Culture, Routine No Growth to date      No Growth to date    Blood culture #1 **CANNOT BE ORDERED STAT** [[id number]] Collected: 08/22/22 1951    Order Status: Completed Specimen: Blood from Peripheral, Hand, Left Updated: 08/24/22 2212     Blood Culture, Routine No Growth to date      No Growth to date      No Growth to date    Urine culture [[id number]] Collected: 08/23/22 1415    Order Status: Completed Specimen: Urine Updated: 08/24/22 1822     Urine Culture, Routine No growth    Narrative:      Specimen Source->Urine        Latest lactate reviewed, they are-  Recent Labs   Lab 08/22/22 2003   LACTATE 0.6       Organ dysfunction indicated by Acute kidney injury  Source- Urine vs. Lung    Source control Achieved by- Antibiotics    Pt with hx of multiple drug resistant organisms including Proteus, Pseudomonas, Klebsiella and Acinetobacter. Py previously on Zerbexa, unclear duration.     Bxl with GPC in 1/2 bottles. Repeat cx ordered. Resp cx with GNR.  UCx with Enterococcus and Candida  --ID consulted;  believe blood, urine, and respiratory cultures are colonization; however, do believe we should continue to treat for RUE cellulitis with doxycycline  --Continue doxycyline only for cellulitis        Oncology  Anemia of chronic disease  Baseline around 9.    --Daily CBC    Endocrine  On tube feeding diet  See PEG    GI  Cholecystostomy care  [facility]cyst[facility] tube [facility] at [facility] 01/2022    --Monitor drain output  --Site Care    PEG (percutaneous endoscopic gastrostomy) status  CT abdo shows stable PEG tube    --PEG care per protocol  --PEG previously to suction, no SBO thus suction off  --Continue tube feeds    Orthopedic  Swelling of arm  Right arm swelling that hs been progressing according to nursing home. US negative for DVT.  Xray with soft tissue edema.    Improved    --Elevate RUE  --Lovenox for DVT prophylaxis    Wounds, multiple  --Wound care consulted  --Pressure reduction strategies, Turn q2       Critical Care Daily Checklist:    A: Awake: RASS Goal/Actual Goal:    Actual: [place] Agitation Sedation Scale (RASS): Alert and calm   B: Spontaneous Breathing Trial Performed? Spon. Breathing Trial Initiated?: Not initiated (08/23/22 0721)   C: SAT & SBT Coordinated?  na                      D: Delirium: CAM-ICU Overall CAM-ICU: Positive   E: Early Mobility Performed? No   F: Feeding Goal: Goals: Meet % EEN, EPN by RD f/u date  Status: Nutrition Goal Status: new   Current Diet Order   Procedures    Diet NPO      AS: Analgesia/Sedation na   T: Thromboembolic Prophylaxis lovenox   H: HOB > 300 No   U: Stress Ulcer Prophylaxis (if needed) Famotidine BID   G: Glucose Control yes   B: Bowel Function Stool Occurrence: 1   I: Indwelling Catheter (Lines & Carroll) Necessity PIV, ET tube, PEG tube, carroll   D: De-escalation of Antimicrobials/Pharmacotherapies Stop vanc and merrem, continue doxy    Plan for the day/ETD Pursue NH placement    Code Status:  Family/Goals of Care: DNR  - per palliative, now DNR        Critical secondary to Patient has a condition that poses threat to life and bodily function: ventilator dependent chronically      Critical care was time spent personally by me on the following activities: development of treatment plan with patient or surrogate and bedside caregivers, discussions with consultants, evaluation of patient's response to treatment, examination of patient, ordering and performing treatments and interventions, ordering and review of laboratory studies, ordering and review of radiographic studies, pulse oximetry, re-evaluation of patient's condition. This critical care time did not overlap with that of any other provider or involve time for any procedures.     Tiara Garcia MD  Critical Care Medicine  [facility] - Cardiac Medical [facility]

## 2022-08-25 NOTE — PROGRESS NOTES
"[facility] - Cardiac Medical ICU  Palliative Medicine  Progress Note    Patient Name: Genna Felix  MRN: [id number]  Admission Date: 8/22/2022  Hospital Length of Stay: 3 days  Code Status: DNR   Attending Provider: Tonny Aparicio MD  Consulting Provider: Lisa Castellano MD  Primary Care Physician: Primary Doctor No  Principal Problem:Sepsis    Patient information was obtained from relative(s) and primary team.      Assessment/Plan:     Palliative care encounter  61 year old female with anoxic brain injury who is vent dependent in a nursing home admitted with concerns for sepsis and MEGAN. Palliative consulted for GOC. [facility]     Code status: DNR following conversation with daughter 8/25/22    Surrogate decision maker: Legal NOK is patient's two children. Per daughter Starr, patient's son is in agreement with whatever she decides     GOC/ACP  8/25/22 Spoke with patient's daughter along with palliative LCSW Dora Patino. Daughter wishes to continue vent indefinitely. If patient's heart were to stop despite aggressive therapies, she would want her mother to have a natural and peaceful death. She believes this would be God calling her home     -Goals remain life prolonging up until the point that patient's heart stops. Code status changed to DNR       8/23/22  -Met with daughter Starr at bedside along with palliative LCSW Dora Patino. Re-introduced palliative medicine. Discussed patient's journey since cardiac arrest leading to anoxic brain injury. Starr says that she is hopeful that her mother will "come out of it" but also says that she doesn't think her mother with ever be the same. Describes her mother as very independent. She shared with us that she and her mother have a very strong bond. She wonders if things would have turned out differently if her mother would have sought medical treatment when she first started feeling bad. She believes that her " "mother would want to be kept alive on life support indefinitely until "God is ready for her."             I will follow-up with patient. Please contact us if you have any additional questions.    Subjective:     Chief Complaint:   Chief Complaint   Patient presents with    Abnormal Lab     Pt arrives from [facility] for abnormal labs. Unknown abnormal labs. Staff unable to inform ems. Pt is on a ventilator at current baseline.        HPI:   Per critical care H&P  "61-year-old  female with a PMHx of asthma, GERD s/p cardiac arrest with anoxic brain injury in 2020, now in a persistent vegetative state with a trach/PEG and vent dependent who is a resident of [facility]. Pt was brought to the ED by EMS for right arm swelling. Upon arrival to the ED she was found to be hyperkalemic to 6.2 and hypothermic concerning for sepsis."     Patient is being treated with broad spectrum abx. Cultures are pending. She is being treated for MEGAN. Palliative is consulted for GO. Patient has had recurrent hospitalizations. Patient has been seen multiple times by the palliative medicine team at [facility]. Patient does not follow commands. Unable to provide any history or participate in GOC conversation. Daughter Starr is at bedside. Patient is Full code. Of note patient was DNR during 1/2022 hospitalization      Hospital Course:  No notes on file    Interval Hx: Patient continues in persistent vegetative state. MEGAN being treated. Being treated for cellulitis. No family at bedside      Past Medical History:   Diagnosis Date    Anoxic brain damage 07/2020    Bedbound 07/2020    Cardiac arrest 07/2020    Cirrhosis     GERD (gastroesophageal reflux disease)     Hemangioma of intra-abdominal structure     Hypertension     Nursing home resident     Protein calorie malnutrition     Pulmonary embolus     Respiratory distress     S/P percutaneous endoscopic gastrostomy (PEG) tube placement " 07/2020    Total self-care deficit 07/2020    Tracheostomy dependence     7/2020       Past Surgical History:   Procedure Laterality Date    PEG W/TRACHEOSTOMY PLACEMENT  07/27/2020    SKIN GRAFT      buttocks       Review of patient's allergies indicates:  No Known Allergies    Medications:  Continuous Infusions:   sodium chloride 0.9% 5 mL/hr at 08/25/22 1400     Scheduled Meds:   artificial tears  1 drop Both Eyes TID    ciprofloxacin HCl  1 drop Left Eye Q4H    collagenase   Topical (Top) Every other day    enoxaparin  40 mg Subcutaneous Daily    famotidine  20 mg Per G Tube BID    meropenem (MERREM) IVPB  2 g Intravenous Q8H    mupirocin   Nasal BID    polyethylene glycol  17 g Oral Daily    sennosides 8.8 mg/5 ml  5 mL Oral QHS     PRN Meds:acetaminophen, albuterol-ipratropium, [COMPLETED] calcium gluconate IVPB **AND** calcium gluconate IVPB, dextrose 10%, dextrose 10%, sodium chloride 0.9%, vancomycin - pharmacy to dose    Family History       Problem Relation (Age of Onset)    No Known Problems Mother, Father          Tobacco Use    Smoking status: Never Smoker    Smokeless tobacco: Never Used   Substance and Sexual Activity    Alcohol use: Not Currently    Drug use: Not Currently    Sexual activity: Not Currently       Review of Systems   Unable to perform ROS: Patient nonverbal   Objective:     Vital Signs (Most Recent):  Temp: 96.6 °F (35.9 °C) (08/25/22 1105)  Pulse: 76 (08/25/22 1400)  Resp: 19 (08/25/22 1400)  BP: 129/75 (08/25/22 1400)  SpO2: 97 % (08/25/22 1400)   Vital Signs (24h Range):  Temp:  [96 °F (35.6 °C)-98.4 °F (36.9 °C)] 96.6 °F (35.9 °C)  Pulse:  [72-98] 76  Resp:  [14-40] 19  SpO2:  [93 %-100 %] 97 %  BP: (108-129)/(60-85) 129/75     Weight: 77.6 kg (171 lb 1.2 oz)  Body mass index is 33.41 kg/m².    Physical Exam  Vitals and nursing note reviewed.   Constitutional:       General: She is not in acute distress.     Appearance: She is ill-appearing.      Comments: awake    HENT:      Head: Normocephalic.      Right Ear: External ear normal.      Left Ear: External ear normal.      Nose: Nose normal.      Mouth/Throat:      Comments: Trach in place  Eyes:      Comments: Does not track   Cardiovascular:      Rate and Rhythm: Tachycardia present.   Pulmonary:      Effort: No respiratory distress.      Comments: On vent  Abdominal:      General: There is no distension.   Musculoskeletal:      Cervical back: Normal range of motion.      Comments: Moves extremities spontaneously   Neurological:      Comments: Does not follow commands, does not interact       Review of Symptoms      Symptom Assessment (ESAS 0-10 Scale)  Pain:  0  Dyspnea:  0  Anxiety:  0  Nausea:  0  Depression:  0  Anorexia:  0  Fatigue:  0  Insomnia:  0  Restlessness:  0  Agitation:  0 due to Patient nonverbal     CAM / Delirium:  Negative  Constipation:  Negative  Diarrhea:  Negative      Bowel Management Plan (BMP):  Yes      Pain Assessment in Advanced Demential Scale (PAINAD)   Breathing - Independent of vocalization:  0  Negative vocalization:  0  Facial expression:  0  Body language:  0  Consolability:  0  Total:  0    Modified Mahendra Scale:  0    Performance Status:  10    Living Arrangements:  Lives in nursing home    Psychosocial/Cultural: See palliative LCSW's psychosocial     Spiritual:  F - Leta and Belief:  Yes  I - Importance:  Yes  C - Community:  Yes  A - Address in Care:  Yes    Advance Care Planning   Advance Directives:   Living Will: No    LaPOST: Yes    Do Not Resuscitate Status: No    Medical Power of : No      Decision Making:  Family answered questions       Significant Labs: All pertinent labs within the past 24 hours have been reviewed.  CBC:   Recent Labs   Lab 08/25/22 0414   WBC 6.63   HGB 9.8*   HCT 31.4*   MCV 88          BMP:  Recent Labs   Lab 08/25/22 0414   *      K 4.5      CO2 26   BUN 26*   CREATININE 0.6   CALCIUM 9.9   MG 1.9       LFT:  Lab  Results   Component Value Date    AST 27 08/25/2022    ALKPHOS 203 (H) 08/25/2022    BILITOT 0.9 08/25/2022     Albumin:   Albumin   Date Value Ref Range Status   08/25/2022 2.9 (L) 3.5 - 5.2 g/dL Final     Protein:   Total Protein   Date Value Ref Range Status   08/25/2022 9.1 (H) 6.0 - 8.4 g/dL Final     Lactic acid:   Lab Results   Component Value Date    LACTATE 0.6 08/22/2022    LACTATE 0.7 08/10/2022       Significant Imaging: I have reviewed all pertinent imaging results/findings within the past 24 hours.    CT abdomen/pelvis 8/22/22  Impression:     No evidence of bowel obstruction or inflammation.     Cholecystostomy tube in stable position with decompressed gallbladder and cholelithiasis.     Persistent subsegmental opacities in the bilateral lung bases, right side greater than left.     Stable partially visualized soft tissue nodules in the right lateral chest wall when compared with multiple prior studies.          Lisa Castellano MD  Palliative Medicine  [facility] Cardiac Medical ICU      > 50% of  35 min visit spent in chart review, face to face discussion of goals of care,  symptom assessment, coordination of care and emotional support

## 2022-08-25 NOTE — ASSESSMENT & PLAN NOTE
"61 year old female with anoxic brain injury who is vent dependent in a nursing home admitted with concerns for sepsis and MEGAN. Palliative consulted for GOC. Has been seen by palliative medicine team at the [facility] [place]     Code status: DNR following conversation with daughter 8/25/22    Surrogate decision maker: Legal NOK is patient's two children. Per daughter Starr, patient's son is in agreement with whatever she decides     GOC/ACP  8/25/22 Spoke with patient's daughter along with palliative LCSW Dora Patino. Daughter wishes to continue vent indefinitely. If patient's heart were to stop despite aggressive therapies, she would want her mother to have a natural and peaceful death. She believes this would be God calling her home     -Goals remain life prolonging up until the point that patient's heart stops. Code status changed to DNR       8/23/22  -Met with daughter Starr at bedside along with palliative LCSW Dora Patino. Re-introduced palliative medicine. Discussed patient's journey since cardiac arrest leading to anoxic brain injury. Starr says that she is hopeful that her mother will "come out of it" but also says that she doesn't think her mother with ever be the same. Describes her mother as very independent. She shared with us that she and her mother have a very strong bond. She wonders if things would have turned out differently if her mother would have sought medical treatment when she first started feeling bad. She believes that her mother would want to be kept alive on life support indefinitely until "God is ready for her."       "

## 2022-08-25 NOTE — PROGRESS NOTES
Pharmacokinetic Assessment Follow Up: IV Vancomycin    Vancomycin serum concentration assessment(s):    Vancomycin trough level resulted at 42 mcg/mL, drawn appropriately. Goal level is 10 to 20 mcg/mL.     Drug levels (last 3 results):  Recent Labs   Lab Result Units 08/25/22  1018   Vancomycin-Trough ug/mL 42.0*     Vancomycin Regimen Plan:    Discontinue the scheduled vancomycin IV regimen and re-dose when the random level is less than 20 mcg/mL. Next level to be drawn with morning labs on 8/26.     Pharmacy will continue to follow and monitor vancomycin.    Please contact pharmacy at extension [phone] for questions regarding this assessment.    Thank you for the consult,   Farhana Bhatti, PharmD, BCCCP             Patient brief summary:  Genna Felix is a 61 y.o. female initiated on antimicrobial therapy with IV vancomycin for treatment of sepsis    Drug Allergies:   Review of patient's allergies indicates:  No Known Allergies    Actual Body Weight:   77.6 kg     Renal Function:   Estimated Creatinine Clearance: 92.9 mL/min (based on SCr of 0.6 mg/dL).    Dialysis Method (if applicable):  N/A    CBC (last 72 hours):  Recent Labs   Lab Result Units 08/22/22 2003 08/23/22  0504 08/24/22  0354 08/25/22  0414   WBC K/uL 7.05 9.25 7.26 6.63   Hemoglobin g/dL 10.8* 10.0* 9.9* 9.8*   Hematocrit % 34.6* 32.4* 31.9* 31.4*   Platelets K/uL 368 346 304 282   Gran % % 66.9 78.7* 65.5 61.9   Lymph % % 17.9* 9.1* 18.7 19.8   Mono % % 9.1 7.6 10.7 12.4   Eosinophil % % 5.2 4.1 4.5 5.1   Basophil % % 0.6 0.3 0.3 0.5   Differential Method  Automated Automated Automated Automated       Metabolic Panel (last 72 hours):  Recent Labs   Lab Result Units 08/22/22 2003 08/22/22 2053 08/22/22 2217 08/23/22  0504 08/23/22  1212 08/23/22  1415 08/23/22  1551 08/23/22 2002 08/23/22  2351 08/24/22  0354 08/24/22  0757 08/24/22  1241 08/24/22  1633 08/25/22  0414   Sodium mmol/L 132*  --  130* 130* 130*  --  133* 133* 137 136  136  135* 136 138 137   Potassium mmol/L 6.1*  --  4.9 6.2* 4.4  --  5.5* 4.9 4.6 4.9  4.9 4.6 4.4 4.6 4.5   Chloride mmol/L 104  --  105 100 100  --  100 103 103 103  103 103 103 105 104   CO2 mmol/L 21*  --  19* 21* 21*  --  24 24 22* 25  25 25 26 21* 26   Glucose mg/dL 85  --  109 71 55*  --  81 97 102 84  84 88 121* 92 114*   Glucose, UA   --  Negative  --   --   --  Negative  --   --   --   --   --   --   --   --    BUN mg/dL 38*  --  36* 38* 35*  --  32* 30* 29* 29*  29* 27* 24* 23* 26*   Creatinine mg/dL 0.5  --  0.6 0.6 0.6  --  0.6 0.6 0.6 0.6  0.6 0.6 0.6 0.6 0.6   Albumin g/dL 3.2*  --   --  3.2*  --   --   --   --   --  3.0*  --   --   --  2.9*   Total Bilirubin mg/dL 1.1*  --   --  1.6*  --   --   --   --   --  1.2*  --   --   --  0.9   Alkaline Phosphatase U/L 201*  --   --  193*  --   --   --   --   --  199*  --   --   --  203*   AST U/L 44*  --   --  51*  --   --   --   --   --  33  --   --   --  27   ALT U/L 42  --   --  48*  --   --   --   --   --  40  --   --   --  35   Magnesium mg/dL 2.1  --   --  2.0  --   --   --   --   --  1.9  --   --   --  1.9   Phosphorus mg/dL  --   --   --  3.7  --   --   --   --   --  3.2  --   --   --  2.3*       Vancomycin Administrations:  vancomycin given in the last 96 hours                   vancomycin 1.25 g in dextrose 5% 250 mL IVPB (ready to mix) (mg) 1,250 mg New Bag 08/24/22 2311     1,250 mg New Bag  1117    vancomycin 1.5 g in dextrose 5 % 250 mL IVPB (ready to mix) ()  Restarted 08/23/22 2349     1,500 mg New Bag  2342    vancomycin 1.25 g in dextrose 5% 250 mL IVPB (ready to mix) (mg) 1,250 mg New Bag 08/22/22 2131                Microbiologic Results:  Microbiology Results (last 7 days)     Procedure Component Value Units Date/Time    Culture, Respiratory with Gram Stain [[id number]]  (Abnormal) Collected: 08/23/22 0314    Order Status: Completed Specimen: Respiratory from Tracheal Aspirate Updated: 08/25/22 0908     Respiratory Culture No S aureus  isolated.      GRAM NEGATIVE CHRISTINA  Many  Identification and susceptibility pending       Gram Stain (Respiratory) <10 epithelial cells per low power field.     Gram Stain (Respiratory) Rare WBC's     Gram Stain (Respiratory) Rare Gram positive cocci     Gram Stain (Respiratory) Rare yeast     Gram Stain (Respiratory) Moderate Gram negative rods    Culture, Respiratory with Gram Stain [[id number]]  (Abnormal) Collected: 08/23/22 1502    Order Status: Completed Specimen: Respiratory from Tracheal Aspirate Updated: 08/25/22 0907     Respiratory Culture No S aureus isolated.      GRAM NEGATIVE CHRISTINA  Many  Identification and susceptibility pending       Gram Stain (Respiratory) Rare WBC's     Gram Stain (Respiratory) Few Gram negative rods     Gram Stain (Respiratory) Rare Gram positive cocci    Blood culture [[id number]] Collected: 08/23/22 2335    Order Status: Completed Specimen: Blood from Peripheral, Forearm, Right Updated: 08/25/22 0613     Blood Culture, Routine No Growth to date      No Growth to date    Blood culture [[id number]] Collected: 08/23/22 2334    Order Status: Completed Specimen: Blood from Peripheral, Antecubital, Right Updated: 08/25/22 0613     Blood Culture, Routine No Growth to date      No Growth to date    Blood culture #1 **CANNOT BE ORDERED STAT** [[id number]] Collected: 08/22/22 1951    Order Status: Completed Specimen: Blood from Peripheral, Hand, Left Updated: 08/24/22 2212     Blood Culture, Routine No Growth to date      No Growth to date      No Growth to date    Urine culture [[id number]] Collected: 08/23/22 1415    Order Status: Completed Specimen: Urine Updated: 08/24/22 1822     Urine Culture, Routine No growth    Narrative:      Specimen Source->Urine    Urine culture [[id number]]  (Abnormal) Collected: 08/22/22 2053    Order Status: Completed Specimen: Urine Updated: 08/24/22 1235     Urine Culture, Routine ENTEROCOCCUS SPECIES  10,000 - 49,999 cfu/ml  Identification and susceptibility  pending        CANDIDA TROPICALIS  > 100,000 cfu/ml  Identification pending  Treatment of asymptomatic candiduria is not recommended (except for   specific populations). Candida isolated in the urine typically   represents colonization. If an indwelling urinary catheter is present  it should be removed or replaced.      Narrative:      Specimen Source->Urine    Blood culture #2 **CANNOT BE ORDERED STAT** [[id number]] Collected: 08/22/22 2010    Order Status: Completed Specimen: Blood from Peripheral, Forearm, Left Updated: 08/24/22 0907     Blood Culture, Routine Gram stain aer bottle: Gram positive cocci in clusters resembling Staph      Results called to and read back by: Samantha Barroso RN. 08/23/2022        21:35

## 2022-08-25 NOTE — ASSESSMENT & PLAN NOTE
Cholecystomy tube placed at [facility] 01/2022    --Monitor drain output  --Site Care

## 2022-08-25 NOTE — PLAN OF CARE
"Advance Care Planning   [facility]  Palliative Care       Patient Name: Genna Felix  MRN: [id number]  Admission Date: 8/22/2022  Hospital Length of Stay: 3 days  Code Status: DNR   Attending Provider: Tonny Aparicio MD  Palliative Care Provider: Dr. Castellano  Primary Care Physician: Primary Doctor No  Principal Problem:Sepsis     Visit to bedside. No family present.    Phone conference held with hospitals Care MD and this SW with pt's daughter Starr ([phone]).  Daughter wishes to continue vent indefinitely. Daughter stated that she wants to "do whatever it takes to keep her here until its time to say goodbye." Dr. Castellano asked for clarification, daughter confirmed that if pt's heart were to stop despite aggressive therapies, she would want her mother to have a peaceful and natural death. She believes this would be God calling her home.     Support provided to daughter. Will continue to follow.    Dora Patino, JUAN, ACHP-SW    "

## 2022-08-25 NOTE — ASSESSMENT & PLAN NOTE
This patient does have evidence of infective focus  My overall impression is sepsis. Vital signs were reviewed and noted in progress note.  Antibiotics given-   Antibiotics (From admission, onward)            Start     Stop Route Frequency Ordered    08/25/22 1200  vancomycin - pharmacy to dose         -- IV pharmacy to manage frequency 08/25/22 1100    08/25/22 0200  ciprofloxacin HCl 0.3 % ophthalmic solution 1 drop         08/30 0159 LEFT EYE Every 4 hours 08/23/22 0640    08/24/22 0900  mupirocin 2 % ointment         08/29 0859 Nasl 2 times daily 08/24/22 0751    08/23/22 1200  meropenem (MERREM) 2 g in sodium chloride 0.9% 100 mL IVPB         -- IV Every 8 hours (non-standard times) 08/23/22 0930        Cultures were taken-   Microbiology Results (last 7 days)     Procedure Component Value Units Date/Time    Blood culture #2 **CANNOT BE ORDERED STAT** [[id number]]  (Abnormal) Collected: 08/22/22 2010    Order Status: Completed Specimen: Blood from Peripheral, Forearm, Left Updated: 08/25/22 1423     Blood Culture, Routine Gram stain aer bottle: Gram positive cocci in clusters resembling Staph      Results called to and read back by: Samantha Barroso RN. 08/23/2022        21:35      COAGULASE-NEGATIVE STAPHYLOCOCCUS SPECIES  Organism is a probable contaminant      Culture, Respiratory with Gram Stain [[id number]]  (Abnormal)  (Susceptibility) Collected: 08/23/22 0314    Order Status: Completed Specimen: Respiratory from Tracheal Aspirate Updated: 08/25/22 1232     Respiratory Culture No S aureus isolated.      GRAM NEGATIVE CHRISTINA  Many  Identification and susceptibility pending        PSEUDOMONAS AERUGINOSA   Many       Gram Stain (Respiratory) <10 epithelial cells per low power field.     Gram Stain (Respiratory) Rare WBC's     Gram Stain (Respiratory) Rare Gram positive cocci     Gram Stain (Respiratory) Rare yeast     Gram Stain (Respiratory) Moderate Gram negative rods    Urine culture [[id number]]   (Abnormal)  (Susceptibility) Collected: 08/22/22 2053    Order Status: Completed Specimen: Urine Updated: 08/25/22 1151     Urine Culture, Routine ENTEROCOCCUS FAECIUM VRE  10,000 - 49,999 cfu/ml        CANDIDA TROPICALIS  > 100,000 cfu/ml  Treatment of asymptomatic candiduria is not recommended (except for   specific populations). Candida isolated in the urine typically   represents colonization. If an indwelling urinary catheter is present  it should be removed or replaced.      Narrative:      Specimen Source->Urine    Culture, Respiratory with Gram Stain [[id number]]  (Abnormal) Collected: 08/23/22 1502    Order Status: Completed Specimen: Respiratory from Tracheal Aspirate Updated: 08/25/22 0907     Respiratory Culture No S aureus isolated.      GRAM NEGATIVE CHRISTINA  Many  Identification and susceptibility pending       Gram Stain (Respiratory) Rare WBC's     Gram Stain (Respiratory) Few Gram negative rods     Gram Stain (Respiratory) Rare Gram positive cocci    Blood culture [[id number]] Collected: 08/23/22 2335    Order Status: Completed Specimen: Blood from Peripheral, Forearm, Right Updated: 08/25/22 0613     Blood Culture, Routine No Growth to date      No Growth to date    Blood culture [[id number]] Collected: 08/23/22 2334    Order Status: Completed Specimen: Blood from Peripheral, Antecubital, Right Updated: 08/25/22 0613     Blood Culture, Routine No Growth to date      No Growth to date    Blood culture #1 **CANNOT BE ORDERED STAT** [[id number]] Collected: 08/22/22 1951    Order Status: Completed Specimen: Blood from Peripheral, Hand, Left Updated: 08/24/22 2212     Blood Culture, Routine No Growth to date      No Growth to date      No Growth to date    Urine culture [[id number]] Collected: 08/23/22 1415    Order Status: Completed Specimen: Urine Updated: 08/24/22 1822     Urine Culture, Routine No growth    Narrative:      Specimen Source->Urine        Latest lactate reviewed, they are-  Recent Labs   Lab  08/22/22 2003   LACTATE 0.6       Organ dysfunction indicated by Acute kidney injury  Source- Urine vs. Lung    Source control Achieved by- Antibiotics    Pt with hx of multiple drug resistant organisms including Proteus, Pseudomonas, Klebsiella and Acinetobacter. Py previously on Zerbexa, unclear duration.     Bxl with GPC in 1/2 bottles. Repeat cx ordered. Resp cx with GNR.  UCx with Enterococcus and Candida  --ID consulted; believe blood, urine, and respiratory cultures are colonization; however, do believe we should continue to treat for RUE cellulitis with doxycycline  --Continue doxycyline only for cellulitis

## 2022-08-25 NOTE — SUBJECTIVE & OBJECTIVE
Interval Hx: Patient continues in persistent vegetative state. MEGAN being treated. Being treated for cellulitis. No family at bedside      Past Medical History:   Diagnosis Date    Anoxic brain damage 07/2020    Bedbound 07/2020    Cardiac arrest 07/2020    Cirrhosis     GERD (gastroesophageal reflux disease)     Hemangioma of intra-abdominal structure     Hypertension     Nursing home resident     Protein calorie malnutrition     Pulmonary embolus     Respiratory distress     S/P percutaneous endoscopic gastrostomy (PEG) tube placement 07/2020    Total self-care deficit 07/2020    Tracheostomy dependence     7/2020       Past Surgical History:   Procedure Laterality Date    PEG W/TRACHEOSTOMY PLACEMENT  07/27/2020    SKIN GRAFT      buttocks       Review of patient's allergies indicates:  No Known Allergies    Medications:  Continuous Infusions:   sodium chloride 0.9% 5 mL/hr at 08/25/22 1400     Scheduled Meds:   artificial tears  1 drop Both Eyes TID    ciprofloxacin HCl  1 drop Left Eye Q4H    collagenase   Topical (Top) Every other day    enoxaparin  40 mg Subcutaneous Daily    famotidine  20 mg Per G Tube BID    meropenem (MERREM) IVPB  2 g Intravenous Q8H    mupirocin   Nasal BID    polyethylene glycol  17 g Oral Daily    sennosides 8.8 mg/5 ml  5 mL Oral QHS     PRN Meds:acetaminophen, albuterol-ipratropium, [COMPLETED] calcium gluconate IVPB **AND** calcium gluconate IVPB, dextrose 10%, dextrose 10%, sodium chloride 0.9%, vancomycin - pharmacy to dose    Family History       Problem Relation (Age of Onset)    No Known Problems Mother, Father          Tobacco Use    Smoking status: Never Smoker    Smokeless tobacco: Never Used   Substance and Sexual Activity    Alcohol use: Not Currently    Drug use: Not Currently    Sexual activity: Not Currently       Review of Systems   Unable to perform ROS: Patient nonverbal   Objective:     Vital Signs (Most Recent):  Temp: 96.6 °F (35.9  °C) (08/25/22 1105)  Pulse: 76 (08/25/22 1400)  Resp: 19 (08/25/22 1400)  BP: 129/75 (08/25/22 1400)  SpO2: 97 % (08/25/22 1400)   Vital Signs (24h Range):  Temp:  [96 °F (35.6 °C)-98.4 °F (36.9 °C)] 96.6 °F (35.9 °C)  Pulse:  [72-98] 76  Resp:  [14-40] 19  SpO2:  [93 %-100 %] 97 %  BP: (108-129)/(60-85) 129/75     Weight: 77.6 kg (171 lb 1.2 oz)  Body mass index is 33.41 kg/m².    Physical Exam  Vitals and nursing note reviewed.   Constitutional:       General: She is not in acute distress.     Appearance: She is ill-appearing.      Comments: awake   HENT:      Head: Normocephalic.      Right Ear: External ear normal.      Left Ear: External ear normal.      Nose: Nose normal.      Mouth/Throat:      Comments: Trach in place  Eyes:      Comments: Does not track   Cardiovascular:      Rate and Rhythm: Tachycardia present.   Pulmonary:      Effort: No respiratory distress.      Comments: On vent  Abdominal:      General: There is no distension.   Musculoskeletal:      Cervical back: Normal range of motion.      Comments: Moves extremities spontaneously   Neurological:      Comments: Does not follow commands, does not interact       Review of Symptoms      Symptom Assessment (ESAS 0-10 Scale)  Pain:  0  Dyspnea:  0  Anxiety:  0  Nausea:  0  Depression:  0  Anorexia:  0  Fatigue:  0  Insomnia:  0  Restlessness:  0  Agitation:  0 due to Patient nonverbal     CAM / Delirium:  Negative  Constipation:  Negative  Diarrhea:  Negative      Bowel Management Plan (BMP):  Yes      Pain Assessment in Advanced Demential Scale (PAINAD)   Breathing - Independent of vocalization:  0  Negative vocalization:  0  Facial expression:  0  Body language:  0  Consolability:  0  Total:  0    Modified Mahendra Scale:  0    Performance Status:  10    Living Arrangements:  Lives in nursing home    Psychosocial/Cultural: See palliative LCSW's psychosocial     Spiritual:  F - Leta and Belief:  Yes  I - Importance:  Yes  C - Community:  Yes  A -  Address in Care:  Yes    Advance Care Planning   Advance Directives:   Living Will: No    LaPOST: Yes    Do Not Resuscitate Status: No    Medical Power of : No      Decision Making:  Family answered questions       Significant Labs: All pertinent labs within the past 24 hours have been reviewed.  CBC:   Recent Labs   Lab 08/25/22 0414   WBC 6.63   HGB 9.8*   HCT 31.4*   MCV 88          BMP:  Recent Labs   Lab 08/25/22 0414   *      K 4.5      CO2 26   BUN 26*   CREATININE 0.6   CALCIUM 9.9   MG 1.9       LFT:  Lab Results   Component Value Date    AST 27 08/25/2022    ALKPHOS 203 (H) 08/25/2022    BILITOT 0.9 08/25/2022     Albumin:   Albumin   Date Value Ref Range Status   08/25/2022 2.9 (L) 3.5 - 5.2 g/dL Final     Protein:   Total Protein   Date Value Ref Range Status   08/25/2022 9.1 (H) 6.0 - 8.4 g/dL Final     Lactic acid:   Lab Results   Component Value Date    LACTATE 0.6 08/22/2022    LACTATE 0.7 08/10/2022       Significant Imaging: I have reviewed all pertinent imaging results/findings within the past 24 hours.    CT abdomen/pelvis 8/22/22  Impression:     No evidence of bowel obstruction or inflammation.     Cholecystostomy tube in stable position with decompressed gallbladder and cholelithiasis.     Persistent subsegmental opacities in the bilateral lung bases, right side greater than left.     Stable partially visualized soft tissue nodules in the right lateral chest wall when compared with multiple prior studies.

## 2022-08-25 NOTE — ASSESSMENT & PLAN NOTE
Patient with Hypoxic Respiratory failure which is Chronic.  she is on home oxygen at On ventilator LPM. Supplemental oxygen was provided and noted- Vent Mode: A/C  Oxygen Concentration (%):  [25] 25  Resp Rate Total:  [14 br/min-23 br/min] 14 br/min  Vt Set:  [400 mL] 400 mL  PEEP/CPAP:  [5 cmH20] 5 cmH20  Mean Airway Pressure:  [10 [id number] cmH20] 12 cmH20.   Signs/symptoms of respiratory failure include- respiratory distress. Contributing diagnoses includes - Pneumonia Labs and images were reviewed. Patient Has recent ABG, which has been reviewed. Will treat underlying causes and adjust management of respiratory failure as follows-     CXR with coarse interstitial attenuation and bibasilar opacities    -Duonebs q 6 hrs  -No longer suspect VAP; respiratory culture represents contaminant per ID  -Lung protective ventilation

## 2022-08-25 NOTE — PROGRESS NOTES
[facility]  Infectious Disease  Progress Note    Patient Name: Genna Felix  MRN: [id number]  Admission Date: 8/22/2022  Length of Stay: 3 days  Attending Physician: Tonny Aparicio MD  Primary Care Provider: Primary Doctor No    Isolation Status: Contact  Assessment/Plan:      * Sepsis  Hypothermic and with tachycardia on admission with concern for sepsis as underlying etiology.  Potential sources of infection include the respiratory tract, intra-abdominal, urinary tract, and skin and soft tissue infection.  She does not have she does not have leukocytosis laboratory workup.  Chest x-ray shows bibasilar patchy opacities and CT of the abdomen shows persistent subsegmental opacities at the bilateral lung bases.  Changes have been present since at least 08/10/2022.  On on examination a right arm up to the bone shows changes concerning for a skin soft tissue infection.  Ultrasound to rule out DVT showed a small complex fluid collection in the soft tissues of the inner arm measuring 1.4 x 1 x 0.8 cm.    Remains hypothermic and on warming blanket. No leukocytosis noted. Blood cultures 1 of 4 for CONS which suspect is a contaminant. Respiratory culture with GNR pending identification. Suspect this is a colonizer as patient is not requiring higher doses of supplemental oxygen or ventilatory support.  Repeat urine cultures after carroll tubing exchange are no growth.   · Recommend de-escalating meropenem and vancomycin to doxycycline for skin and soft tissue infection.  · Would treat for a total of 7 days.     Swelling of arm  Concern for cellulitis due to skin excoriations. Complex fluid collection seen on imaging.   · Management as above.       Anticipated Disposition: per primary    Thank you for your consult. I will sign off. Please contact us if you have any additional questions.    Stephie Mendiola MD  Infectious Disease  [facility]    Subjective:     Principal  "Problem:Sepsis    HPI: A 61-year-old woman with anoxic brain injury in 2020, a persistent vegetative state, ventilator dependent with tracheostomy plus PEG, acute cholecystitis status post IR drainage in January, and recent inpatient admission at [facility] for possible aspiration pneumonia with MDRO organisms who was brought in to [facility] for evaluation from her nursing home due to right arm swelling.  On evaluation she was noted to be hypothermic and laboratory workup revealed hyperkalemia.  Urinalysis revealed pyuria however this was performed from an old Hamilton catheter.  She was admitted to the ICU for management of sepsis suspected to be from pneumonia versus UTI.  She was started on empiric meropenem and vancomycin.    Of note, the patient was treated with Zerbaxa and transition to Cipro on discharge.    Infectious DIseases consulted for "Started Meropenem due to hx of Klebsiella ESBL and Acinetobacter"              Interval History: "". Respiratory cultures with GNR. Blood cultures with CONS in one bottle.     Review of Systems   Unable to perform ROS: Patient nonverbal   Objective:     Vital Signs (Most Recent):  Temp: 96.5 °F (35.8 °C) (08/25/22 1505)  Pulse: 82 (08/25/22 1906)  Resp: 17 (08/25/22 1906)  BP: 137/78 (08/25/22 1906)  SpO2: 99 % (08/25/22 1906) Vital Signs (24h Range):  Temp:  [96 °F (35.6 °C)-97.1 °F (36.2 °C)] 96.5 °F (35.8 °C)  Pulse:  [71-87] 82  Resp:  [14-40] 17  SpO2:  [93 %-100 %] 99 %  BP: (108-145)/(60-99) 137/78     Weight: 77.6 kg (171 lb 1.2 oz)  Body mass index is 33.41 kg/m².    Estimated Creatinine Clearance: 92.9 mL/min (based on SCr of 0.6 mg/dL).    Physical Exam  Vitals and nursing note reviewed.   Constitutional:       Appearance: She is well-developed.      Interventions: She is intubated.   Eyes:      General: No scleral icterus.        Right eye: No discharge.         Left eye: No discharge.   Neck:      Comments: Tracheostomy in place.   Cardiovascular: "      Rate and Rhythm: Normal rate and regular rhythm.      Heart sounds: Normal heart sounds. No murmur heard.  Pulmonary:      Effort: Pulmonary effort is normal. She is intubated.      Breath sounds: Wheezing present.   Abdominal:      General: Bowel sounds are normal.      Palpations: Abdomen is soft.      Comments: PEG in place   Musculoskeletal:      Comments: Ankylosed extremities. Decreased RUE edema with multiple open ulcerations.    Skin:     General: Skin is warm and dry.   Neurological:      Comments: Non verbal. Does not track or follow commands.        Significant Labs: Blood Culture:   Recent Labs   Lab 08/10/22  1719 08/22/22  1951 08/22/22 2010 08/23/22  2334 08/23/22  2335   LABBLOO No growth after 5 days. No Growth to date  No Growth to date  No Growth to date Gram stain aer bottle: Gram positive cocci in clusters resembling Staph  Results called to and read back by: Samantha Barroso RN. 08/23/2022    21:35  COAGULASE-NEGATIVE STAPHYLOCOCCUS SPECIES  Organism is a probable contaminant  * No Growth to date  No Growth to date No Growth to date  No Growth to date       BMP:   Recent Labs   Lab 08/25/22  0414   *      K 4.5      CO2 26   BUN 26*   CREATININE 0.6   CALCIUM 9.9   MG 1.9       CBC:   Recent Labs   Lab 08/24/22  0354 08/25/22  0414   WBC 7.26 6.63   HGB 9.9* 9.8*   HCT 31.9* 31.4*    282       Respiratory Culture:   Recent Labs   Lab 03/17/22  1337 07/29/22  0306 08/10/22  1728 08/23/22  0314 08/23/22  1502   GSRESP Rare WBC's  Many Gram positive cocci  Many Gram negative rods Many WBC's  <10 epithelial cells per low power field.  Few Gram negative rods <10 epithelial cells per low power field.  Moderate WBC's  Few Gram negative rods  Rare Gram positive cocci <10 epithelial cells per low power field.  Rare WBC's  Rare Gram positive cocci  Rare yeast  Moderate Gram negative rods Rare WBC's  Few Gram negative rods  Rare Gram positive cocci    RESPIRATORYC No S aureus or Pseudomonas isolated.  SERRATIA MARCESCENS  Many  *  PROVIDENCIA STUARTII  Many  Normal respiratory kenny also present  * PSEUDOMONAS AERUGINOSA  Moderate  Zerbaxa 1 S SOPHIA values are expressed in mcg/mL.  Testing performed by:  [facility]  [address] [place] [address]  Dr.Brian Loreto MD  *  PROTEUS MIRABILIS ESBL  Moderate  Results called to and read back by RN M3 ANKIT HOLDSWORTH  07/31/2022    09:37 JT  * KLEBSIELLA PNEUMONIAE ESBL  Moderate  *  ACINETOBACTER BAUMANNII   Moderate  Results called to and read back by Ester Warren RN-ED;  08/13/2022    08:33 CJD  * No S aureus isolated.  GRAM NEGATIVE CHRISTINA  Many  Identification and susceptibility pending  *  PSEUDOMONAS AERUGINOSA   Many  * No S aureus isolated.  GRAM NEGATIVE CHRISTINA  Many  Identification and susceptibility pending  *       Urine Culture:   Recent Labs   Lab 03/20/22  0100 07/29/22  0545 08/10/22  1816 08/22/22  2053 08/23/22  1415   LABURIN No growth Multiple organisms isolated. None in predominance.  Repeat if  clinically necessary. CANDIDA TROPICALIS  10,000 - 49,999 cfu/ml  Treatment of asymptomatic candiduria is not recommended (except for   specific populations). Candida isolated in the urine typically   represents colonization. If an indwelling urinary catheter is present  it should be removed or replaced.  * ENTEROCOCCUS FAECIUM VRE  10,000 - 49,999 cfu/ml  *  CANDIDA TROPICALIS  > 100,000 cfu/ml  Treatment of asymptomatic candiduria is not recommended (except for   specific populations). Candida isolated in the urine typically   represents colonization. If an indwelling urinary catheter is present  it should be removed or replaced.  * No growth       Urine Studies:   Recent Labs   Lab 08/10/22  1816 08/22/22  2053 08/23/22  1415   COLORU Yellow   < > Yellow   APPEARANCEUA Clear   < > Hazy*   PHUR 7.0   < > 5.0   SPECGRAV >=1.030*   < > 1.015   PROTEINUA 2+*   < > 1+*    GLUCUA Negative   < > Negative   KETONESU Trace*   < > Trace*   BILIRUBINUA 1+*   < > Negative   OCCULTUA 3+*   < > 1+*   NITRITE Negative   < > Negative   UROBILINOGEN 4.0-6.0*  --   --    LEUKOCYTESUR Negative   < > 3+*   RBCUA 21*   < > 14*   WBCUA 75*   < > 57*   BACTERIA Negative   < > Few*   SQUAMEPITHEL 23   < > 1   HYALINECASTS 100*   < > 0    < > = values in this interval not displayed.         Significant Imaging: I have reviewed all pertinent imaging results/findings within the past 24 hours.

## 2022-08-26 VITALS
BODY MASS INDEX: 33.41 KG/M2 | HEIGHT: 60 IN | DIASTOLIC BLOOD PRESSURE: 72 MMHG | OXYGEN SATURATION: 100 % | HEART RATE: 75 BPM | TEMPERATURE: 98 F | SYSTOLIC BLOOD PRESSURE: 126 MMHG | RESPIRATION RATE: 15 BRPM | WEIGHT: 170.19 LBS

## 2022-08-26 LAB
ALBUMIN SERPL BCP-MCNC: 2.9 G/DL (ref 3.5–5.2)
ALP SERPL-CCNC: 192 U/L (ref 55–135)
ALT SERPL W/O P-5'-P-CCNC: 31 U/L (ref 10–44)
ANION GAP SERPL CALC-SCNC: 11 MMOL/L (ref 8–16)
AST SERPL-CCNC: 24 U/L (ref 10–40)
BACTERIA SPEC AEROBE CULT: ABNORMAL
BASOPHILS # BLD AUTO: 0.03 K/UL (ref 0–0.2)
BASOPHILS NFR BLD: 0.6 % (ref 0–1.9)
BILIRUB SERPL-MCNC: 0.6 MG/DL (ref 0.1–1)
BUN SERPL-MCNC: 30 MG/DL (ref 6–20)
CALCIUM SERPL-MCNC: 9.9 MG/DL (ref 8.7–10.5)
CHLORIDE SERPL-SCNC: 105 MMOL/L (ref 95–110)
CO2 SERPL-SCNC: 27 MMOL/L (ref 23–29)
CREAT SERPL-MCNC: 0.5 MG/DL (ref 0.5–1.4)
DIFFERENTIAL METHOD: ABNORMAL
EOSINOPHIL # BLD AUTO: 0.3 K/UL (ref 0–0.5)
EOSINOPHIL NFR BLD: 5.6 % (ref 0–8)
ERYTHROCYTE [DISTWIDTH] IN BLOOD BY AUTOMATED COUNT: 18.5 % (ref 11.5–14.5)
EST. GFR  (NO RACE VARIABLE): >60 ML/MIN/1.73 M^2
GLUCOSE SERPL-MCNC: 91 MG/DL (ref 70–110)
GRAM STN SPEC: ABNORMAL
HCT VFR BLD AUTO: 31.1 % (ref 37–48.5)
HGB BLD-MCNC: 9.4 G/DL (ref 12–16)
IMM GRANULOCYTES # BLD AUTO: 0.02 K/UL (ref 0–0.04)
IMM GRANULOCYTES NFR BLD AUTO: 0.4 % (ref 0–0.5)
LYMPHOCYTES # BLD AUTO: 1.1 K/UL (ref 1–4.8)
LYMPHOCYTES NFR BLD: 20 % (ref 18–48)
MAGNESIUM SERPL-MCNC: 1.8 MG/DL (ref 1.6–2.6)
MCH RBC QN AUTO: 26.6 PG (ref 27–31)
MCHC RBC AUTO-ENTMCNC: 30.2 G/DL (ref 32–36)
MCV RBC AUTO: 88 FL (ref 82–98)
MONOCYTES # BLD AUTO: 0.7 K/UL (ref 0.3–1)
MONOCYTES NFR BLD: 12.2 % (ref 4–15)
NEUTROPHILS # BLD AUTO: 3.3 K/UL (ref 1.8–7.7)
NEUTROPHILS NFR BLD: 61.2 % (ref 38–73)
NRBC BLD-RTO: 0 /100 WBC
PHOSPHATE SERPL-MCNC: 1.9 MG/DL (ref 2.7–4.5)
PLATELET # BLD AUTO: 309 K/UL (ref 150–450)
PMV BLD AUTO: 11.9 FL (ref 9.2–12.9)
POCT GLUCOSE: 101 MG/DL (ref 70–110)
POCT GLUCOSE: 104 MG/DL (ref 70–110)
POCT GLUCOSE: 106 MG/DL (ref 70–110)
POCT GLUCOSE: 80 MG/DL (ref 70–110)
POCT GLUCOSE: 85 MG/DL (ref 70–110)
POCT GLUCOSE: 88 MG/DL (ref 70–110)
POCT GLUCOSE: 93 MG/DL (ref 70–110)
POTASSIUM SERPL-SCNC: 4.3 MMOL/L (ref 3.5–5.1)
PROT SERPL-MCNC: 9 G/DL (ref 6–8.4)
RBC # BLD AUTO: 3.53 M/UL (ref 4–5.4)
SARS-COV-2 RNA RESP QL NAA+PROBE: NOT DETECTED
SODIUM SERPL-SCNC: 143 MMOL/L (ref 136–145)
VANCOMYCIN SERPL-MCNC: 27.2 UG/ML
WBC # BLD AUTO: 5.34 K/UL (ref 3.9–12.7)

## 2022-08-26 PROCEDURE — 99900035 HC TECH TIME PER 15 MIN (STAT)

## 2022-08-26 PROCEDURE — 99900026 HC AIRWAY MAINTENANCE (STAT)

## 2022-08-26 PROCEDURE — 80202 ASSAY OF VANCOMYCIN: CPT | Performed by: INTERNAL MEDICINE

## 2022-08-26 PROCEDURE — 63600175 PHARM REV CODE 636 W HCPCS: Performed by: STUDENT IN AN ORGANIZED HEALTH CARE EDUCATION/TRAINING PROGRAM

## 2022-08-26 PROCEDURE — 25000003 PHARM REV CODE 250: Performed by: STUDENT IN AN ORGANIZED HEALTH CARE EDUCATION/TRAINING PROGRAM

## 2022-08-26 PROCEDURE — U0005 INFEC AGEN DETEC AMPLI PROBE: HCPCS | Performed by: INTERNAL MEDICINE

## 2022-08-26 PROCEDURE — U0003 INFECTIOUS AGENT DETECTION BY NUCLEIC ACID (DNA OR RNA); SEVERE ACUTE RESPIRATORY SYNDROME CORONAVIRUS 2 (SARS-COV-2) (CORONAVIRUS DISEASE [COVID-19]), AMPLIFIED PROBE TECHNIQUE, MAKING USE OF HIGH THROUGHPUT TECHNOLOGIES AS DESCRIBED BY CMS-2020-01-R: HCPCS | Performed by: INTERNAL MEDICINE

## 2022-08-26 PROCEDURE — 84100 ASSAY OF PHOSPHORUS: CPT | Performed by: NURSE PRACTITIONER

## 2022-08-26 PROCEDURE — 94761 N-INVAS EAR/PLS OXIMETRY MLT: CPT

## 2022-08-26 PROCEDURE — 27000221 HC OXYGEN, UP TO 24 HOURS

## 2022-08-26 PROCEDURE — 83735 ASSAY OF MAGNESIUM: CPT | Performed by: NURSE PRACTITIONER

## 2022-08-26 PROCEDURE — 25000003 PHARM REV CODE 250: Performed by: INTERNAL MEDICINE

## 2022-08-26 PROCEDURE — 94003 VENT MGMT INPAT SUBQ DAY: CPT

## 2022-08-26 PROCEDURE — 80053 COMPREHEN METABOLIC PANEL: CPT | Performed by: NURSE PRACTITIONER

## 2022-08-26 PROCEDURE — 63600175 PHARM REV CODE 636 W HCPCS: Performed by: NURSE PRACTITIONER

## 2022-08-26 PROCEDURE — 27200966 HC CLOSED SUCTION SYSTEM

## 2022-08-26 PROCEDURE — 63600175 PHARM REV CODE 636 W HCPCS: Performed by: INTERNAL MEDICINE

## 2022-08-26 PROCEDURE — 85025 COMPLETE CBC W/AUTO DIFF WBC: CPT | Performed by: NURSE PRACTITIONER

## 2022-08-26 RX ORDER — DOXYCYCLINE HYCLATE 100 MG
100 TABLET ORAL EVERY 12 HOURS
Start: 2022-08-26 | End: 2022-08-30

## 2022-08-26 RX ORDER — DOXYCYCLINE HYCLATE 100 MG
100 TABLET ORAL EVERY 12 HOURS
Status: DISCONTINUED | OUTPATIENT
Start: 2022-08-26 | End: 2022-08-26

## 2022-08-26 RX ORDER — DOXYCYCLINE HYCLATE 100 MG
100 TABLET ORAL EVERY 12 HOURS
Status: DISCONTINUED | OUTPATIENT
Start: 2022-08-26 | End: 2022-08-26 | Stop reason: HOSPADM

## 2022-08-26 RX ORDER — MAGNESIUM SULFATE HEPTAHYDRATE 40 MG/ML
2 INJECTION, SOLUTION INTRAVENOUS ONCE
Status: COMPLETED | OUTPATIENT
Start: 2022-08-26 | End: 2022-08-26

## 2022-08-26 RX ORDER — SODIUM,POTASSIUM PHOSPHATES 280-250MG
2 POWDER IN PACKET (EA) ORAL EVERY 4 HOURS
Status: COMPLETED | OUTPATIENT
Start: 2022-08-26 | End: 2022-08-26

## 2022-08-26 RX ADMIN — ENOXAPARIN SODIUM 40 MG: 40 INJECTION SUBCUTANEOUS at 05:08

## 2022-08-26 RX ADMIN — DIBASIC SODIUM PHOSPHATE, MONOBASIC POTASSIUM PHOSPHATE AND MONOBASIC SODIUM PHOSPHATE 2 TABLET: 852; 155; 130 TABLET ORAL at 06:08

## 2022-08-26 RX ADMIN — MEROPENEM 2 G: 1 INJECTION INTRAVENOUS at 03:08

## 2022-08-26 RX ADMIN — CIPROFLOXACIN 1 DROP: 3 SOLUTION OPHTHALMIC at 05:08

## 2022-08-26 RX ADMIN — DIBASIC SODIUM PHOSPHATE, MONOBASIC POTASSIUM PHOSPHATE AND MONOBASIC SODIUM PHOSPHATE 2 TABLET: 852; 155; 130 TABLET ORAL at 07:08

## 2022-08-26 RX ADMIN — MAGNESIUM SULFATE 2 G: 2 INJECTION INTRAVENOUS at 05:08

## 2022-08-26 RX ADMIN — MUPIROCIN: 20 OINTMENT TOPICAL at 08:08

## 2022-08-26 RX ADMIN — FAMOTIDINE 20 MG: 20 TABLET ORAL at 08:08

## 2022-08-26 RX ADMIN — CIPROFLOXACIN 1 DROP: 3 SOLUTION OPHTHALMIC at 02:08

## 2022-08-26 RX ADMIN — HYPROMELLOSE 2910 1 DROP: 5 SOLUTION OPHTHALMIC at 02:08

## 2022-08-26 RX ADMIN — POTASSIUM & SODIUM PHOSPHATES POWDER PACK 280-160-250 MG 2 PACKET: 280-160-250 PACK at 02:08

## 2022-08-26 RX ADMIN — DOXYCYCLINE HYCLATE 100 MG: 100 TABLET, COATED ORAL at 08:08

## 2022-08-26 RX ADMIN — HYPROMELLOSE 2910 1 DROP: 5 SOLUTION OPHTHALMIC at 08:08

## 2022-08-26 RX ADMIN — CIPROFLOXACIN 1 DROP: 3 SOLUTION OPHTHALMIC at 10:08

## 2022-08-26 RX ADMIN — POTASSIUM & SODIUM PHOSPHATES POWDER PACK 280-160-250 MG 2 PACKET: 280-160-250 PACK at 05:08

## 2022-08-26 RX ADMIN — DIBASIC SODIUM PHOSPHATE, MONOBASIC POTASSIUM PHOSPHATE AND MONOBASIC SODIUM PHOSPHATE 2 TABLET: 852; 155; 130 TABLET ORAL at 05:08

## 2022-08-26 RX ADMIN — POTASSIUM & SODIUM PHOSPHATES POWDER PACK 280-160-250 MG 2 PACKET: 280-160-250 PACK at 10:08

## 2022-08-26 NOTE — SUBJECTIVE & OBJECTIVE
Interval History: ". Respiratory cultures with GNR. Blood cultures with CONS in one bottle.     Review of Systems   Unable to perform ROS: Patient nonverbal   Objective:     Vital Signs (Most Recent):  Temp: 96.5 °F (35.8 °C) (08/25/22 1505)  Pulse: 82 (08/25/22 1906)  Resp: 17 (08/25/22 1906)  BP: 137/78 (08/25/22 1906)  SpO2: 99 % (08/25/22 1906) Vital Signs (24h Range):  Temp:  [96 °F (35.6 °C)-97.1 °F (36.2 °C)] 96.5 °F (35.8 °C)  Pulse:  [71-87] 82  Resp:  [14-40] 17  SpO2:  [93 %-100 %] 99 %  BP: (108-145)/(60-99) 137/78     Weight: 77.6 kg (171 lb 1.2 oz)  Body mass index is 33.41 kg/m².    Estimated Creatinine Clearance: 92.9 mL/min (based on SCr of 0.6 mg/dL).    Physical Exam  Vitals and nursing note reviewed.   Constitutional:       Appearance: She is well-developed.      Interventions: She is intubated.   Eyes:      General: No scleral icterus.        Right eye: No discharge.         Left eye: No discharge.   Neck:      Comments: Tracheostomy in place.   Cardiovascular:      Rate and Rhythm: Normal rate and regular rhythm.      Heart sounds: Normal heart sounds. No murmur heard.  Pulmonary:      Effort: Pulmonary effort is normal. She is intubated.      Breath sounds: Wheezing present.   Abdominal:      General: Bowel sounds are normal.      Palpations: Abdomen is soft.      Comments: PEG in place   Musculoskeletal:      Comments: Ankylosed extremities. Decreased RUE edema with multiple open ulcerations.    Skin:     General: Skin is warm and dry.   Neurological:      Comments: Non verbal. Does not track or follow commands.        Significant Labs: Blood Culture:   Recent Labs   Lab 08/10/22  1719 08/22/22 1951 08/22/22 2010 08/23/22  2334 08/23/22  2335   LABBLOO No growth after 5 days. No Growth to date  No Growth to date  No Growth to date Gram stain aer bottle: Gram positive cocci in clusters resembling Staph  Results called to and read back by: Samantha Barroso RN. 08/23/2022    21:35   COAGULASE-NEGATIVE STAPHYLOCOCCUS SPECIES  Organism is a probable contaminant  * No Growth to date  No Growth to date No Growth to date  No Growth to date       BMP:   Recent Labs   Lab 08/25/22  0414   *      K 4.5      CO2 26   BUN 26*   CREATININE 0.6   CALCIUM 9.9   MG 1.9       CBC:   Recent Labs   Lab 08/24/22  0354 08/25/22  0414   WBC 7.26 6.63   HGB 9.9* 9.8*   HCT 31.9* 31.4*    282       Respiratory Culture:   Recent Labs   Lab 03/17/22  1337 07/29/22  0306 08/10/22  1728 08/23/22  0314 08/23/22  1502   GSRESP Rare WBC's  Many Gram positive cocci  Many Gram negative rods Many WBC's  <10 epithelial cells per low power field.  Few Gram negative rods <10 epithelial cells per low power field.  Moderate WBC's  Few Gram negative rods  Rare Gram positive cocci <10 epithelial cells per low power field.  Rare WBC's  Rare Gram positive cocci  Rare yeast  Moderate Gram negative rods Rare WBC's  Few Gram negative rods  Rare Gram positive cocci   RESPIRATORYC No S aureus or Pseudomonas isolated.  SERRATIA MARCESCENS  Many  *  PROVIDENCIA STUARTII  Many  Normal respiratory kenny also present  * PSEUDOMONAS AERUGINOSA  Moderate  Zerbaxa 1 S SOPHIA values are expressed in mcg/mL.  Testing performed by:  [address] [address]  Dr.Brian Loreto MD  *  PROTEUS MIRABILIS ESBL  Moderate  Results called to and read back by RN M3 ANKIT HOLDSWORTH  07/31/2022    09:37 JT  * KLEBSIELLA PNEUMONIAE ESBL  Moderate  *  ACINETOBACTER BAUMANNII   Moderate  Results called to and read back by Ester Warren RN-ED;  08/13/2022    08:33 CJD  * No S aureus isolated.  GRAM NEGATIVE CHRISTINA  Many  Identification and susceptibility pending  *  PSEUDOMONAS AERUGINOSA   Many  * No S aureus isolated.  GRAM NEGATIVE CHRISTINA  Many  Identification and susceptibility pending  *       Urine Culture:   Recent Labs   Lab 03/20/22  0100 07/29/22  0545  08/10/22  1816 08/22/22  2053 08/23/22  1415   LABURIN No growth Multiple organisms isolated. None in predominance.  Repeat if  clinically necessary. CANDIDA TROPICALIS  10,000 - 49,999 cfu/ml  Treatment of asymptomatic candiduria is not recommended (except for   specific populations). Candida isolated in the urine typically   represents colonization. If an indwelling urinary catheter is present  it should be removed or replaced.  * ENTEROCOCCUS FAECIUM VRE  10,000 - 49,999 cfu/ml  *  CANDIDA TROPICALIS  > 100,000 cfu/ml  Treatment of asymptomatic candiduria is not recommended (except for   specific populations). Candida isolated in the urine typically   represents colonization. If an indwelling urinary catheter is present  it should be removed or replaced.  * No growth       Urine Studies:   Recent Labs   Lab 08/10/22  1816 08/22/22  2053 08/23/22  1415   COLORU Yellow   < > Yellow   APPEARANCEUA Clear   < > Hazy*   PHUR 7.0   < > 5.0   SPECGRAV >=1.030*   < > 1.015   PROTEINUA 2+*   < > 1+*   GLUCUA Negative   < > Negative   KETONESU Trace*   < > Trace*   BILIRUBINUA 1+*   < > Negative   OCCULTUA 3+*   < > 1+*   NITRITE Negative   < > Negative   UROBILINOGEN 4.0-6.0*  --   --    LEUKOCYTESUR Negative   < > 3+*   RBCUA 21*   < > 14*   WBCUA 75*   < > 57*   BACTERIA Negative   < > Few*   SQUAMEPITHEL 23   < > 1   HYALINECASTS 100*   < > 0    < > = values in this interval not displayed.         Significant Imaging: I have reviewed all pertinent imaging results/findings within the past 24 hours.

## 2022-08-26 NOTE — PROGRESS NOTES
Therapy with vancomycin IV complete and/or consult discontinued by provider.  Pharmacy will sign off, please re-consult as needed.    Emi Sinclair, Pharm.D.

## 2022-08-26 NOTE — PLAN OF CARE
Transport set up for 4:30 pm with ventilator ambulance. MD, bedside RN, and family notified.    Bedside RN Emi to call report to [phone] and ask for Linh.    TARA to follow for further needs.      Dimple Amos RN     [phone]

## 2022-08-26 NOTE — PLAN OF CARE
CM left message for She at [facility] ([facility]) regarding patient ready to discharge to their facility. CM to follow for call back.      Dimple Amos RN     [phone]

## 2022-08-26 NOTE — PLAN OF CARE
CMICU DAILY GOALS       A: Awake    RASS: Goal -    Actual -     Restraint necessity:    B: Breathe   SBT: Not intubated   C: Coordinate A & B, analgesics/sedatives   Pain: managed    SAT: Not intubated  D: Delirium   CAM-ICU: Overall CAM-ICU: Positive  E: Early(intubated/ Progressive (non-intubated) Mobility   MOVE Screen: Fail   Activity: Activity Management: Patient unable to perform activities  FAS: Feeding/Nutrition   Diet order: Diet/Nutrition Received: NPO, tube feeding,    T: Thrombus   DVT prophylaxis: VTE Required Core Measure: Pharmacological prophylaxis initiated/maintained  H: HOB Elevation   Head of Bed (HOB) Positioning: HOB elevated  U: Ulcer Prophylaxis   GI: yes  G: Glucose control   managed Glycemic Management: blood glucose monitored  S: Skin   Bathing/Skin Care: bath, complete, dressed/undressed  Device Skin Pressure Protection: absorbent pad utilized/changed, adhesive use limited, positioning supports utilized, skin-to-device areas padded  Pressure Reduction Devices: specialty bed utilized  Pressure Reduction Techniques: frequent weight shift encouraged, heels elevated off bed, pressure points protected  Skin Protection: adhesive use limited, incontinence pads utilized, skin-to-device areas padded  B: Bowel Function   diarrhea   I: Indwelling Catheters   Hamilton necessity:      Urethral Catheter 08/23/22 1445 Silicone 18 Fr.-Reason for Continuing Urinary Catheterization: Critically ill in ICU and requiring hourly monitoring of intake/output  [REMOVED]      Urethral Catheter 08/02/22 0515 Non-latex 18 Fr.-Reason for Continuing Urinary Catheterization: Chronic Indwelling Urinary Catheter on Admission   CVC necessity: Yes  D: De-escalation Antibiotics   Yes    Family/Goals of care/Code Status   Code Status: DNR    24H Vital Sign Range  Temp:  [94 °F (34.4 °C)-99.4 °F (37.4 °C)]   Pulse:  []   Resp:  [14-35]   BP: (109-137)/(59-82)   SpO2:  [90 %-100 %]      Shift Events   No acute events  throughout shift. Administered medications per orders, pt tolerated well. Strict I and O maintained. Plan for discharge back to [facility]. Report called to Linh.     VS and assessment per flow sheet, patient progressing towards goals as tolerated, plan of care reviewed with Genna, all concerns addressed, will continue to monitor.    Emi Solomon, RN, BSN

## 2022-08-26 NOTE — PLAN OF CARE
CM sent requested documents to [facility] ([facility] via [employer]. CM to follow when to set up transport.      Dimple Amos RN     [phone]

## 2022-08-26 NOTE — PLAN OF CARE
Covid test placed for placement. Bedside RN Emi to call CM once test is complete and sent to lab, in order for CM to have Leadership escalate results.    Dimple Amos RN     [phone]

## 2022-08-26 NOTE — DISCHARGE SUMMARY
[facility] - Cardiac Medical ICU  Critical Care Medicine  Discharge Summary      Patient Name: Genna Felix  MRN: [id number]  Admission Date: 8/22/2022  Hospital Length of Stay: 4 days  Discharge Date and Time: No discharge date for patient encounter.  Attending Physician: Tonny Aparicio MD   Discharging Provider: Tiara Garcia MD  Primary Care Provider: Primary Doctor No  Reason for Admission: cellulitis    HPI:   Genna Felix is a 61-year-old  female with a PMHx of asthma, GERD s/p cardiac arrest with anoxic brain injury in 2020, now in a persistent vegetative state with a trach/PEG and vent dependent who is a resident of [facility]. Pt was brought to the ED by EMS for right arm swelling. Upon arrival to the ED she was found to be hyperkalemic to 6.2 and hypothermic concerning for sepsis.     In the ED the patient was given 10 units of IV insulin and D10. UA + for bacteria, +3 Leuks, 44 WBC. Urine culture pending. Of note patient was hospitalized in the beginning of this month and respiratory culture was positible for Klebsiella ESBL, and Acinetobacter . It is unclear if she was treated for these bacteria. Respiratory cultures from 07/30 were positive for multidrug resistant psuedomonas and Proteus ESBL. The patient was treated with a course of Zerbaxa, duration unclear. Pt presented with RUQ cholecystostomy tube that was placed in 01/2022 at [place]. Hamilton dependent.     Critical care medicine was consulted for sepsis, trach w/vent dependency. She will be admitted to MICU. Started on Meropenem/Vanc. ID consulted.           * No surgery found *    Indwelling Lines/Drains at Time of Discharge:   Lines/Drains/Airways     Drain  Duration                Biliary Tube RUQ -- days         Gastrostomy/Enterostomy LUQ -- days         Biliary Tube 01/13/22 225 days         Urethral Catheter 08/23/22 1445 Silicone 18 Fr. 2 days         Rectal Tube 08/24/22 2300 rectal tube w/ balloon  (indicate number of mLs) 1 day          Airway  Duration                Surgical Airway Portex Cuffed -- days              Hospital Course:   DVT without evidence of clot.  CXR with coarse increased interstitial attenuation and patchy bibasilar infiltrates.  CTA without SBO, stable tube and decompressed gallbladder with cholelithiasis.  Bxl NGTD.  Hyperkalemic again thus shifted.  Given lasix and lokelma.  Repeat K 4.4  UA and Ucx taken before Hamilton exchange.  Hamilton replaced and repeat UA with 3+ leuks and 57 WBC.  ID onboard.  Technically has MEGAN as SCr bumped from 0.3 to 0.6.  Started tube feeds.  Ongoing GOC with family. 1/2 Bxl with GPC.  Repeat cultures ordered. Per consultation with ID, blood, urine, and sputum cultures represent contaminants. However, do believe the patient should be treated for RUE cellulitis with doxycycline (which she can discharge on). CM to assist with transfer back to NH. Discharged to continue 7 more doses of doxycycline for cellulitis.     Physical Exam  Vitals and nursing note reviewed.   Constitutional:       General: She is not in acute distress.     Appearance: She is ill-appearing (chronically). She is not toxic-appearing or diaphoretic.   Cardiovascular:      Comments: Unable to auscultate heart sounds due to loud ventilator noises  Pulmonary:      Effort: No respiratory distress.      Breath sounds: No wheezing.   Abdominal:      Palpations: Abdomen is soft.      Tenderness: There is no guarding.   Musculoskeletal:      Right lower leg: No edema.      Left lower leg: No edema.   Skin:     General: Skin is warm and dry.   Neurological:      Mental Status: She is alert. Mental status is at baseline.      Comments: Responds to uncomfortable stimuli by moving away, does not speak or converse.  Psychiatric:      Comments: Unable to assess 2/2 anoxic brain injury     Consults (From admission, onward)        Status Ordering Provider     Inpatient consult to Registered  Dietitian/Nutritionist  Once        Provider:  (Not yet assigned)    Completed CUCO PINTO     Inpatient consult to Registered Dietitian/Nutritionist  Once        Provider:  (Not yet assigned)    Completed MIS MANZANARES     IP consult to case management  Once        Provider:  (Not yet assigned)    Acknowledged MIS MANZANARES     Inpatient consult to Palliative Care  Once        Provider:  (Not yet assigned)    Completed FAUSTINO BENITEZ     Inpatient consult to Infectious Diseases  Once        Provider:  (Not yet assigned)    Completed FAUSTINO BENITEZ        Significant Labs:      Significant Imaging:  I have reviewed all pertinent imaging results/findings within the past 24 hours.    Pending Diagnostic Studies:     Procedure Component Value Units Date/Time    COVID-19 Routine Screening Extended Care Placement [[id number]] Collected: 08/26/22 1238    Order Status: Sent Lab Status: In process Updated: 08/26/22 1315    Specimen: Nasopharyngeal         Final Active Diagnoses:    Diagnosis Date Noted POA    PRINCIPAL PROBLEM:  Sepsis [A41.9] 04/22/2021 Yes    Palliative care encounter [Z51.5] 08/23/2022 Not Applicable    Cholecystostomy care [Z43.4] 08/22/2022 Not Applicable    Wounds, multiple [T07.XXXA] 08/22/2022 Yes    Swelling of arm [M79.89] 08/22/2022 Yes    Ventilator dependence [Z99.11] 07/29/2022 Not Applicable    Chronic respiratory failure with hypoxia [J96.11] 05/10/2021 Yes    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] 05/01/2021 Not Applicable    PEG (percutaneous endoscopic gastrostomy) status [Z93.1] 05/01/2021 Not Applicable    Anemia of chronic disease [D63.8] 04/21/2021 Yes    Persistent vegetative state [R40.3] 04/21/2021 Yes    On tube feeding diet [Z78.9] 11/13/2020 Yes      Problems Resolved During this Admission:     No new Assessment & Plan notes have been filed under this hospital service since the last note was generated.  Service: Critical Care  Medicine    Discharged Condition: fair    Disposition: Skilled Nursing Facility      Patient Instructions:   No discharge procedures on file.  Medications:  Reconciled Home Medications:      Medication List      START taking these medications    doxycycline 100 MG tablet  Commonly known as: VIBRA-TABS  1 tablet (100 mg total) by Per G Tube route every 12 (twelve) hours. for 7 doses        CONTINUE taking these medications    acetaminophen 325 MG tablet  Commonly known as: TYLENOL  2 tablets (650 mg total) by Per G Tube route every 4 (four) hours as needed for Pain or Temperature greater than (100.4F).     acetylcysteine 100 mg/ml (10%) 100 mg/mL (10 %) nebulizer solution  Commonly known as: MUCOMYST  Take 4 mLs by nebulization every 8 (eight) hours.     albuterol-ipratropium 2.5 mg-0.5 mg/3 mL nebulizer solution  Commonly known as: DUO-NEB  Take 3 mLs by nebulization every 6 (six) hours. Rescue     artificial tears 0.5 % ophthalmic solution  Commonly known as: ISOPTO TEARS  Place 1 drop into both eyes as needed.     chlorhexidine 0.12 % solution  Commonly known as: PERIDEX  Use as directed 15 mLs in the mouth or throat 2 (two) times daily.     ergocalciferol 50,000 unit Cap  Commonly known as: ERGOCALCIFEROL  Take 50,000 Units by mouth every 7 days.     ferrous sulfate 300 mg (60 mg iron)/5 mL syrup  Take 300 mg by mouth As instructed. Every other day     ISOSOURCE ORAL  1.5 per peg tube at 50ml/hr continuously     XIOMARA ORAL  1 Package by PEG Tube route 2 (two) times a day. In 8oz of water     midodrine 10 MG tablet  Commonly known as: PROAMATINE  1 tablet (10 mg total) by Per G Tube route 3 (three) times daily.     polyethylene glycol 17 gram Pwpk  Commonly known as: GLYCOLAX  Take by mouth daily as needed.     sodium chloride 0.9 % Pgbk  Inject into the vein.        STOP taking these medications    BENEPROTEIN 6 gram-25 kcal/7 gram Powd  Generic drug: whey protein isolate     clonazePAM 0.5 MG tablet  Commonly  known as: KlonoPIN     oxybutynin 5 MG Tab  Commonly known as: DITROPAN     tobramycin 40 mg/mL injection  Commonly known as: ERIC Garcia MD  Critical Care Medicine  [facility] - Cardiac Medical ICU

## 2022-08-26 NOTE — SUBJECTIVE & OBJECTIVE
Interval History/Significant Events: NAEON. Cultures thought to be contaminants per ID. However, shold treat for skin/soft tissue infection of RUE    Review of Systems   Unable to perform ROS: Patient nonverbal   Objective:     Vital Signs (Most Recent):  Temp: 98.8 °F (37.1 °C) (08/26/22 1105)  Pulse: 96 (08/26/22 1300)  Resp: 16 (08/26/22 1300)  BP: 131/65 (08/26/22 1300)  SpO2: 95 % (08/26/22 1300)   Vital Signs (24h Range):  Temp:  [94 °F (34.4 °C)-99.4 °F (37.4 °C)] 98.8 °F (37.1 °C)  Pulse:  [] 96  Resp:  [14-31] 16  SpO2:  [95 %-100 %] 95 %  BP: (109-145)/(59-99) 131/65   Weight: 77.2 kg (170 lb 3.1 oz)  Body mass index is 33.24 kg/m².      Intake/Output Summary (Last 24 hours) at 8/26/2022 1423  Last data filed at 8/26/2022 1400  Gross per 24 hour   Intake 1621.17 ml   Output 955 ml   Net 666.17 ml         Physical Exam  Vitals and nursing note reviewed.   Constitutional:       General: She is not in acute distress.     Appearance: She is ill-appearing (chronically). She is not toxic-appearing or diaphoretic.   Cardiovascular:      Comments: Unable to auscultate heart sounds due to loud ventilator noises  Pulmonary:      Effort: No respiratory distress.      Breath sounds: No wheezing.   Abdominal:      Palpations: Abdomen is soft.      Tenderness: There is no guarding.   Musculoskeletal:      Right lower leg: No edema.      Left lower leg: No edema.   Skin:     General: Skin is warm and dry.   Neurological:      Mental Status: She is alert. Mental status is at baseline.      Comments: Responds to uncomfortable stimuli by moving away, does not speak or converse, roving eyes   Psychiatric:      Comments: Unable to assess 2/2 anoxic brain injury       Vents:  Vent Mode: A/C (08/26/22 1143)  Ventilator Initiated: Yes (08/22/22 2034)  Set Rate: 14 BPM (08/26/22 1143)  Vt Set: 400 mL (08/26/22 1143)  PEEP/CPAP: 5 cmH20 (08/26/22 1143)  Oxygen Concentration (%): 25 (08/26/22 1300)  Peak Airway Pressure: 28  cmH2O (08/26/22 1143)  Plateau Pressure: 26 cmH20 (08/26/22 1143)  Total Ve: 4.92 mL (08/26/22 1143)  Negative Inspiratory Force (cm H2O): 0 (08/26/22 1143)  F/VT Ratio<105 (RSBI): (!) 61.09 (08/26/22 1143)  Lines/Drains/Airways       Drain  Duration                  Biliary Tube RUQ -- days         Gastrostomy/Enterostomy LUQ -- days         Biliary Tube 01/13/22 225 days         Urethral Catheter 08/23/22 1445 Silicone 18 Fr. 2 days         Rectal Tube 08/24/22 2300 rectal tube w/ balloon (indicate number of mLs) 1 day              Airway  Duration                  Surgical Airway Portex Cuffed -- days              Peripheral Intravenous Line  Duration                  Midline Catheter Insertion/Assessment  - Single Lumen 08/19/22 Left brachial vein 7 days         Peripheral IV - Single Lumen 08/22/22 1957 20 G Left Hand 3 days         Peripheral IV - Single Lumen 08/22/22 2015 22 G Left Forearm 3 days                  Significant Labs:    CBC/Anemia Profile:  Recent Labs   Lab 08/25/22  0414 08/26/22  0324   WBC 6.63 5.34   HGB 9.8* 9.4*   HCT 31.4* 31.1*    309   MCV 88 88   RDW 18.8* 18.5*          Chemistries:  Recent Labs   Lab 08/24/22  1633 08/25/22  0414 08/26/22  0324    137 143   K 4.6 4.5 4.3    104 105   CO2 21* 26 27   BUN 23* 26* 30*   CREATININE 0.6 0.6 0.5   CALCIUM 9.9 9.9 9.9   ALBUMIN  --  2.9* 2.9*   PROT  --  9.1* 9.0*   BILITOT  --  0.9 0.6   ALKPHOS  --  203* 192*   ALT  --  35 31   AST  --  27 24   MG  --  1.9 1.8   PHOS  --  2.3* 1.9*         All pertinent labs within the past 24 hours have been reviewed.    Significant Imaging:  I have reviewed all pertinent imaging results/findings within the past 24 hours.

## 2022-08-26 NOTE — PLAN OF CARE
[facility]    FACILITY TRANSFER ORDERS      Patient Name: Genna Felix  YOB: 1960    PCP: Primary Doctor No   PCP Address: None  PCP Phone Number: None  PCP Fax: None    Encounter Date: 08/26/2022    Admit to: SKILLED BED    Vital Signs:  Routine    Diagnoses:   Active Hospital Problems    Diagnosis  POA    *Sepsis [A41.9]  Yes    Palliative care encounter [Z51.5]  Not Applicable    Cholecystostomy care [Z43.4]  Not Applicable    Wounds, multiple [T07.XXXA]  Yes    Swelling of arm [M79.89]  Yes    Ventilator dependence [Z99.11]  Not Applicable    Chronic respiratory failure with hypoxia [J96.11]  Yes    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       Allergies:Review of patient's allergies indicates:  No Known Allergies    Diet: tube feeds Isosource 1.5 ulises at 40cc/hr    Activities: Bed rest    Goals of Care Treatment Preferences:  Code Status: DNR       LaPOST: Yes           Nursing:   - Clean PEG site daily with soap and water; use gauze as buffer between skin and tube.  - Change carroll monthly     Labs: no labs no labs     CONSULTS:    None    MISCELLANEOUS CARE:  PEG Care: Clean site every 24 hours. , Carroll Care: Empty Carroll bag every shift. Change Carroll every month. and Routine Skin for Bedridden Patients: Apply moisture barrier cream to all skin folds and wet areas in perineal area daily and after baths and all bowel movements.    WOUND CARE ORDERS  None    Medications: Review discharge medications with patient and family and provide education.      Current Discharge Medication List      START taking these medications    Details   doxycycline (VIBRA-TABS) 100 MG tablet 1 tablet (100 mg total) by Per G  Tube route every 12 (twelve) hours. for 7 doses         CONTINUE these medications which have NOT CHANGED    Details   acetylcysteine 100 mg/ml, 10%, (MUCOMYST) 100 mg/mL (10 %) nebulizer solution Take 4 mLs by nebulization every 8 (eight) hours.  Refills: 12      albuterol-ipratropium (DUO-NEB) 2.5 mg-0.5 mg/3 mL nebulizer solution Take 3 mLs by nebulization every 6 (six) hours. Rescue  Qty: 1 Box, Refills: 0      chlorhexidine (PERIDEX) 0.12 % solution Use as directed 15 mLs in the mouth or throat 2 (two) times daily.      ergocalciferol (ERGOCALCIFEROL) 50,000 unit Cap Take 50,000 Units by mouth every 7 days.      midodrine (PROAMATINE) 10 MG tablet 1 tablet (10 mg total) by Per G Tube route 3 (three) times daily.  Qty: 90 tablet, Refills: 11      0.9 % sodium chloride (SODIUM CHLORIDE 0.9 %) PgBk Inject into the vein.      acetaminophen (TYLENOL) 325 MG tablet 2 tablets (650 mg total) by Per G Tube route every 4 (four) hours as needed for Pain or Temperature greater than (100.4F).  Refills: 0      arginine/glutamine/calcium bmb (XIOMARA ORAL) 1 Package by PEG Tube route 2 (two) times a day. In 8oz of water      artificial tears (ISOPTO TEARS) 0.5 % ophthalmic solution Place 1 drop into both eyes as needed.      ferrous sulfate 300 mg (60 mg iron)/5 mL syrup Take 300 mg by mouth As instructed. Every other day      lactose-reduced food (ISOSOURCE ORAL) 1.5 per peg tube at 50ml/hr continuously      polyethylene glycol (GLYCOLAX) 17 gram PwPk Take by mouth daily as needed.         STOP taking these medications       clonazePAM (KLONOPIN) 0.5 MG tablet Comments:   Reason for Stopping:         oxybutynin (DITROPAN) 5 MG Tab Comments:   Reason for Stopping:         tobramycin (NEBCIN) 40 mg/mL injection Comments:   Reason for Stopping:         whey protein isolate (BENEPROTEIN) 6 gram-25 kcal/7 gram Powd Comments:   Reason for Stopping:                  Immunizations Administered as of 8/26/2022     No immunizations on  file.          unknown    Some patients may experience side effects after vaccination.  These may include fever, headache, muscle or joint aches.  Most symptoms resolve with 24-48 hours and do not require urgent medical evaluation unless they persist for more than 72 hours or symptoms are concerning for an unrelated medical condition.          _________________________________  Tiara Garcia MD  08/26/2022

## 2022-08-26 NOTE — ASSESSMENT & PLAN NOTE
Hypothermic and with tachycardia on admission with concern for sepsis as underlying etiology.  Potential sources of infection include the respiratory tract, intra-abdominal, urinary tract, and skin and soft tissue infection.  She does not have she does not have leukocytosis laboratory workup.  Chest x-ray shows bibasilar patchy opacities and CT of the abdomen shows persistent subsegmental opacities at the bilateral lung bases.  Changes have been present since at least 08/10/2022.  On on examination a right arm up to the bone shows changes concerning for a skin soft tissue infection.  Ultrasound to rule out DVT showed a small complex fluid collection in the soft tissues of the inner arm measuring 1.4 x 1 x 0.8 cm.    Remains hypothermic and on warming blanket. No leukocytosis noted. Blood cultures 1 of 4 for CONS which suspect is a contaminant. Respiratory culture with GNR pending identification. Suspect this is a colonizer as patient is not requiring higher doses of supplemental oxygen or ventilatory support.  Repeat urine cultures after carroll tubing exchange are no growth.   · Recommend de-escalating meropenem and vancomycin to doxycycline for skin and soft tissue infection.  · Would treat for a total of 7 days.

## 2022-08-26 NOTE — PLAN OF CARE
CMICU DAILY GOALS       A: Awake    RASS: Goal -    Actual -     Restraint necessity:    B: Breathe   SBT: Not attempted   C: Coordinate A & B, analgesics/sedatives   Pain: managed    SAT: Not attempted  D: Delirium   CAM-ICU: Overall CAM-ICU: Positive  E: Early(intubated/ Progressive (non-intubated) Mobility   MOVE Screen: Fail   Activity: Activity Management: Patient unable to perform activities  FAS: Feeding/Nutrition   Diet order: Diet/Nutrition Received: NPO, tube feeding,    T: Thrombus   DVT prophylaxis: VTE Required Core Measure: Pharmacological prophylaxis initiated/maintained  H: HOB Elevation   Head of Bed (HOB) Positioning: HOB at 30-45 degrees  U: Ulcer Prophylaxis   GI: yes  G: Glucose control   managed Glycemic Management: blood glucose monitored  S: Skin   Bathing/Skin Care: bath, complete, linen changed, electrode patches/site rotation, dressed/undressed  Device Skin Pressure Protection: absorbent pad utilized/changed, adhesive use limited, positioning supports utilized, pressure points protected, skin-to-device areas padded, skin-to-skin areas padded  Pressure Reduction Devices: specialty bed utilized  Pressure Reduction Techniques: weight shift assistance provided  Skin Protection: adhesive use limited, incontinence pads utilized, skin-to-device areas padded, skin-to-skin areas padded, tubing/devices free from skin contact  B: Bowel Function   no issues   I: Indwelling Catheters   Hamilton necessity:      Urethral Catheter 08/23/22 1445 Silicone 18 Fr.-Reason for Continuing Urinary Catheterization: Critically ill in ICU and requiring hourly monitoring of intake/output  [REMOVED]      Urethral Catheter 08/02/22 0515 Non-latex 18 Fr.-Reason for Continuing Urinary Catheterization: Chronic Indwelling Urinary Catheter on Admission   CVC necessity: No  D: De-escalation Antibiotics   Yes    Family/Goals of care/Code Status   Code Status: DNR    24H Vital Sign Range  Temp:  [94 °F (34.4 °C)-98.3 °F (36.8  °C)]   Pulse:  []   Resp:  [14-40]   BP: (108-145)/(60-99)   SpO2:  [93 %-100 %]      Shift Events   No acute events throughout shift. Phosphorus and Magnesium replaced.     VS and assessment per flow sheet, patient progressing towards goals as tolerated, plan of care reviewed with family, all concerns addressed, will continue to monitor.

## 2022-08-27 LAB
BACTERIA BLD CULT: NORMAL
BACTERIA SPEC AEROBE CULT: ABNORMAL
GRAM STN SPEC: ABNORMAL

## 2022-08-27 NOTE — NURSING
Patient leaving with EMS via stretcher on EMS monitor and vent. Patient belongings and discharge instructions with EMS. Report called to Kimberlyn by previous RN . Attempted to call Kimberlyn to alert of EMS departure x 3 with no answer.

## 2022-08-27 NOTE — PROGRESS NOTES
Patient leaving with EMS. Discharge instructions in folder and reviewed with EMS. Patient show no signs understanding DC instructions.

## 2022-08-29 LAB
BACTERIA BLD CULT: NORMAL
BACTERIA BLD CULT: NORMAL

## 2022-09-26 ENCOUNTER — APPOINTMENT (OUTPATIENT)
Dept: LAB | Facility: HOSPITAL | Age: 62
End: 2022-09-26
Attending: NURSE PRACTITIONER
Payer: MEDICARE

## 2022-09-26 DIAGNOSIS — N39.0 URINARY TRACT INFECTION, SITE NOT SPECIFIED: Primary | ICD-10-CM

## 2022-09-26 PROCEDURE — 87086 URINE CULTURE/COLONY COUNT: CPT | Performed by: NURSE PRACTITIONER

## 2022-09-27 LAB
BACTERIA UR CULT: NORMAL
BACTERIA UR CULT: NORMAL

## 2022-10-20 ENCOUNTER — HOSPITAL ENCOUNTER (EMERGENCY)
Facility: HOSPITAL | Age: 62
Discharge: HOME | End: 2022-10-21
Attending: EMERGENCY MEDICINE
Payer: MEDICARE

## 2022-10-20 DIAGNOSIS — N39.0 URINARY TRACT INFECTION WITHOUT HEMATURIA, SITE UNSPECIFIED: Primary | ICD-10-CM

## 2022-10-20 LAB
ALBUMIN SERPL BCP-MCNC: 3.4 G/DL (ref 3.5–5.2)
ALP SERPL-CCNC: 186 U/L (ref 55–135)
ALT SERPL W/O P-5'-P-CCNC: 58 U/L (ref 10–44)
ANION GAP SERPL CALC-SCNC: 8 MMOL/L (ref 8–16)
APTT PPP: 39.7 SEC (ref 23.3–35.1)
AST SERPL-CCNC: 31 U/L (ref 10–40)
BACTERIA #/AREA URNS HPF: ABNORMAL /HPF
BASOPHILS # BLD AUTO: 0.02 K/UL (ref 0–0.2)
BASOPHILS NFR BLD: 0.3 % (ref 0–1.9)
BILIRUB SERPL-MCNC: 0.7 MG/DL (ref 0.1–1)
BILIRUB UR QL STRIP: NEGATIVE
BUN SERPL-MCNC: 24 MG/DL (ref 6–20)
CALCIUM SERPL-MCNC: 9.3 MG/DL (ref 8.7–10.5)
CHLORIDE SERPL-SCNC: 103 MMOL/L (ref 95–110)
CK MB SERPL-MCNC: 1.3 NG/ML (ref 0.1–6.5)
CK SERPL-CCNC: 56 U/L (ref 20–180)
CLARITY UR: ABNORMAL
CO2 SERPL-SCNC: 23 MMOL/L (ref 23–29)
COLOR UR: YELLOW
CREAT SERPL-MCNC: <0.3 MG/DL (ref 0.5–1.4)
DIFFERENTIAL METHOD: ABNORMAL
EOSINOPHIL # BLD AUTO: 0.6 K/UL (ref 0–0.5)
EOSINOPHIL NFR BLD: 8.3 % (ref 0–8)
ERYTHROCYTE [DISTWIDTH] IN BLOOD BY AUTOMATED COUNT: 17.7 % (ref 11.5–14.5)
EST. GFR  (NO RACE VARIABLE): >60 ML/MIN/1.73 M^2
GLUCOSE SERPL-MCNC: 97 MG/DL (ref 70–110)
GLUCOSE UR QL STRIP: NEGATIVE
HCT VFR BLD AUTO: 32.5 % (ref 37–48.5)
HGB BLD-MCNC: 10.2 G/DL (ref 12–16)
HGB UR QL STRIP: NEGATIVE
HYALINE CASTS #/AREA URNS LPF: 13 /LPF
IMM GRANULOCYTES # BLD AUTO: 0.02 K/UL (ref 0–0.04)
IMM GRANULOCYTES NFR BLD AUTO: 0.3 % (ref 0–0.5)
INR PPP: 1.2
KETONES UR QL STRIP: NEGATIVE
LACTATE SERPL-SCNC: 1.3 MMOL/L (ref 0.5–1.9)
LEUKOCYTE ESTERASE UR QL STRIP: ABNORMAL
LYMPHOCYTES # BLD AUTO: 1.8 K/UL (ref 1–4.8)
LYMPHOCYTES NFR BLD: 26.9 % (ref 18–48)
MAGNESIUM SERPL-MCNC: 1.8 MG/DL (ref 1.6–2.6)
MCH RBC QN AUTO: 27.3 PG (ref 27–31)
MCHC RBC AUTO-ENTMCNC: 31.4 G/DL (ref 32–36)
MCV RBC AUTO: 87 FL (ref 82–98)
MICROSCOPIC COMMENT: ABNORMAL
MONOCYTES # BLD AUTO: 0.6 K/UL (ref 0.3–1)
MONOCYTES NFR BLD: 9.3 % (ref 4–15)
NEUTROPHILS # BLD AUTO: 3.7 K/UL (ref 1.8–7.7)
NEUTROPHILS NFR BLD: 54.9 % (ref 38–73)
NITRITE UR QL STRIP: NEGATIVE
NRBC BLD-RTO: 0 /100 WBC
PH UR STRIP: 7 [PH] (ref 5–8)
PLATELET # BLD AUTO: 283 K/UL (ref 150–450)
PMV BLD AUTO: 11 FL (ref 9.2–12.9)
POTASSIUM SERPL-SCNC: 3.9 MMOL/L (ref 3.5–5.1)
PROT SERPL-MCNC: 8.7 G/DL (ref 6–8.4)
PROT UR QL STRIP: ABNORMAL
PROTHROMBIN TIME: 14.7 SEC (ref 11.4–13.7)
RBC # BLD AUTO: 3.74 M/UL (ref 4–5.4)
RBC #/AREA URNS HPF: 3 /HPF (ref 0–4)
SARS-COV-2 RDRP RESP QL NAA+PROBE: NEGATIVE
SODIUM SERPL-SCNC: 134 MMOL/L (ref 136–145)
SP GR UR STRIP: 1.01 (ref 1–1.03)
SQUAMOUS #/AREA URNS HPF: 0 /HPF
TROPONIN I SERPL DL<=0.01 NG/ML-MCNC: <0.03 NG/ML
URN SPEC COLLECT METH UR: ABNORMAL
UROBILINOGEN UR STRIP-ACNC: NEGATIVE EU/DL
WBC # BLD AUTO: 6.65 K/UL (ref 3.9–12.7)
WBC #/AREA URNS HPF: >100 /HPF (ref 0–5)

## 2022-10-20 PROCEDURE — 81001 URINALYSIS AUTO W/SCOPE: CPT | Performed by: EMERGENCY MEDICINE

## 2022-10-20 PROCEDURE — 83605 ASSAY OF LACTIC ACID: CPT | Performed by: EMERGENCY MEDICINE

## 2022-10-20 PROCEDURE — 85025 COMPLETE CBC W/AUTO DIFF WBC: CPT | Performed by: EMERGENCY MEDICINE

## 2022-10-20 PROCEDURE — 96365 THER/PROPH/DIAG IV INF INIT: CPT

## 2022-10-20 PROCEDURE — 85610 PROTHROMBIN TIME: CPT | Performed by: EMERGENCY MEDICINE

## 2022-10-20 PROCEDURE — 94640 AIRWAY INHALATION TREATMENT: CPT

## 2022-10-20 PROCEDURE — 93010 EKG 12-LEAD: ICD-10-PCS | Mod: ,,, | Performed by: INTERNAL MEDICINE

## 2022-10-20 PROCEDURE — 99285 EMERGENCY DEPT VISIT HI MDM: CPT | Mod: 25

## 2022-10-20 PROCEDURE — 93005 ELECTROCARDIOGRAM TRACING: CPT | Performed by: INTERNAL MEDICINE

## 2022-10-20 PROCEDURE — 87077 CULTURE AEROBIC IDENTIFY: CPT | Performed by: EMERGENCY MEDICINE

## 2022-10-20 PROCEDURE — 25000242 PHARM REV CODE 250 ALT 637 W/ HCPCS

## 2022-10-20 PROCEDURE — 84484 ASSAY OF TROPONIN QUANT: CPT | Performed by: EMERGENCY MEDICINE

## 2022-10-20 PROCEDURE — 27000221 HC OXYGEN, UP TO 24 HOURS

## 2022-10-20 PROCEDURE — U0002 COVID-19 LAB TEST NON-CDC: HCPCS | Performed by: EMERGENCY MEDICINE

## 2022-10-20 PROCEDURE — 87186 SC STD MICRODIL/AGAR DIL: CPT | Performed by: EMERGENCY MEDICINE

## 2022-10-20 PROCEDURE — 94761 N-INVAS EAR/PLS OXIMETRY MLT: CPT

## 2022-10-20 PROCEDURE — 94002 VENT MGMT INPAT INIT DAY: CPT | Mod: XB

## 2022-10-20 PROCEDURE — 82550 ASSAY OF CK (CPK): CPT | Performed by: EMERGENCY MEDICINE

## 2022-10-20 PROCEDURE — 85730 THROMBOPLASTIN TIME PARTIAL: CPT | Performed by: EMERGENCY MEDICINE

## 2022-10-20 PROCEDURE — 25000003 PHARM REV CODE 250: Performed by: EMERGENCY MEDICINE

## 2022-10-20 PROCEDURE — 99900026 HC AIRWAY MAINTENANCE (STAT)

## 2022-10-20 PROCEDURE — 93010 ELECTROCARDIOGRAM REPORT: CPT | Mod: ,,, | Performed by: INTERNAL MEDICINE

## 2022-10-20 PROCEDURE — 87040 BLOOD CULTURE FOR BACTERIA: CPT | Mod: 59 | Performed by: EMERGENCY MEDICINE

## 2022-10-20 PROCEDURE — 87086 URINE CULTURE/COLONY COUNT: CPT | Performed by: EMERGENCY MEDICINE

## 2022-10-20 PROCEDURE — 80053 COMPREHEN METABOLIC PANEL: CPT | Performed by: EMERGENCY MEDICINE

## 2022-10-20 PROCEDURE — 99900035 HC TECH TIME PER 15 MIN (STAT)

## 2022-10-20 PROCEDURE — 82553 CREATINE MB FRACTION: CPT | Performed by: EMERGENCY MEDICINE

## 2022-10-20 PROCEDURE — 83735 ASSAY OF MAGNESIUM: CPT | Performed by: EMERGENCY MEDICINE

## 2022-10-20 RX ORDER — LIDOCAINE HYDROCHLORIDE 10 MG/ML
INJECTION INFILTRATION; PERINEURAL
COMMUNITY
Start: 2022-10-03 | End: 2023-07-06

## 2022-10-20 RX ORDER — ALBUTEROL SULFATE 0.83 MG/ML
SOLUTION RESPIRATORY (INHALATION)
Status: COMPLETED
Start: 2022-10-20 | End: 2022-10-20

## 2022-10-20 RX ORDER — CEFUROXIME AXETIL 500 MG/1
500 TABLET ORAL 2 TIMES DAILY
Qty: 20 TABLET | Refills: 0 | Status: SHIPPED | OUTPATIENT
Start: 2022-10-20 | End: 2023-07-06

## 2022-10-20 RX ORDER — CEFTRIAXONE 1 G/1
1 INJECTION, POWDER, FOR SOLUTION INTRAMUSCULAR; INTRAVENOUS DAILY PRN
Status: ON HOLD | COMMUNITY
Start: 2022-10-03 | End: 2023-08-01 | Stop reason: HOSPADM

## 2022-10-20 RX ORDER — COLLAGENASE SANTYL 250 [ARB'U]/G
OINTMENT TOPICAL
COMMUNITY
Start: 2022-10-04 | End: 2023-07-06

## 2022-10-20 RX ADMIN — SODIUM CHLORIDE 500 ML: 0.9 INJECTION, SOLUTION INTRAVENOUS at 11:10

## 2022-10-20 RX ADMIN — ALBUTEROL SULFATE: 2.5 SOLUTION RESPIRATORY (INHALATION) at 09:10

## 2022-10-21 VITALS
DIASTOLIC BLOOD PRESSURE: 66 MMHG | RESPIRATION RATE: 21 BRPM | OXYGEN SATURATION: 100 % | TEMPERATURE: 94 F | HEIGHT: 60 IN | HEART RATE: 70 BPM | BODY MASS INDEX: 33.38 KG/M2 | SYSTOLIC BLOOD PRESSURE: 107 MMHG | WEIGHT: 170 LBS

## 2022-10-21 PROCEDURE — 27000221 HC OXYGEN, UP TO 24 HOURS

## 2022-10-21 PROCEDURE — 94761 N-INVAS EAR/PLS OXIMETRY MLT: CPT

## 2022-10-21 PROCEDURE — 63600175 PHARM REV CODE 636 W HCPCS: Performed by: EMERGENCY MEDICINE

## 2022-10-21 PROCEDURE — 99900026 HC AIRWAY MAINTENANCE (STAT)

## 2022-10-21 RX ADMIN — CEFTRIAXONE 1 G: 1 INJECTION, SOLUTION INTRAVENOUS at 12:10

## 2022-10-21 NOTE — ED PROVIDER NOTES
Encounter Date: 10/20/2022       History     Chief Complaint   Patient presents with    Abnormal Lab     Elevated K+, 6.9 per [facility] (aka [place])     Patient with a history of anoxic brain injury and persistent vegetative state.  Patient lives at [facility].  Patient reportedly with hyperkalemia on routine laboratory draw today.  No change in mental status.  No reported fever.  No family members for further history.    Review of patient's allergies indicates:  No Known Allergies  Past Medical History:   Diagnosis Date    Anoxic brain damage 07/2020    Bedbound 07/2020    Cardiac arrest 07/2020    Cirrhosis     GERD (gastroesophageal reflux disease)     Hemangioma of intra-abdominal structure     Hypertension     Nursing home resident     Protein calorie malnutrition     Pulmonary embolus     Respiratory distress     S/P percutaneous endoscopic gastrostomy (PEG) tube placement 07/2020    Total self-care deficit 07/2020    Tracheostomy dependence     7/2020     Past Surgical History:   Procedure Laterality Date    PEG W/TRACHEOSTOMY PLACEMENT  07/27/2020    SKIN GRAFT      buttocks     Family History   Problem Relation Age of Onset    No Known Problems Mother     No Known Problems Father      Social History     Tobacco Use    Smoking status: Never    Smokeless tobacco: Never   Substance Use Topics    Alcohol use: Not Currently    Drug use: Not Currently     Review of Systems   Unable to perform ROS: Patient unresponsive     Physical Exam     Initial Vitals   BP Pulse Resp Temp SpO2   10/20/22 2006 10/20/22 2006 10/20/22 2006 10/20/22 2057 10/20/22 2006   124/70 72 20 (!) 94.5 °F (34.7 °C) 100 %      MAP       --                Physical Exam    Nursing note and vitals reviewed.  Constitutional: She is not diaphoretic. No distress.   HENT:   Head: Normocephalic and atraumatic.   Eyes: Conjunctivae are normal.   Neck:   Normal range of motion.  Cardiovascular:  Normal rate.           Pulmonary/Chest:    Patient is trached on ventilator.  Mild rhonchi.   Abdominal: Abdomen is soft. Bowel sounds are normal.   Patient with biliary drain and feeding tube in place   Musculoskeletal:      Cervical back: Normal range of motion.      Comments: Right dorsum of hand with deep ulcer     Neurological:   Unresponsive.  No movement of any extremity    Skin: No rash noted.   Stage II left buttock decubitus eye, left greater than right heel decubitus eye   Psychiatric:   Nonverbal       ED Course   Procedures  Labs Reviewed   PROTIME-INR - Abnormal; Notable for the following components:       Result Value    PT 14.7 (*)     All other components within normal limits   APTT - Abnormal; Notable for the following components:    aPTT 39.7 (*)     All other components within normal limits   CBC W/ AUTO DIFFERENTIAL - Abnormal; Notable for the following components:    RBC 3.74 (*)     Hemoglobin 10.2 (*)     Hematocrit 32.5 (*)     MCHC 31.4 (*)     RDW 17.7 (*)     Eos # 0.6 (*)     Eosinophil % 8.3 (*)     All other components within normal limits   COMPREHENSIVE METABOLIC PANEL - Abnormal; Notable for the following components:    Sodium 134 (*)     BUN 24 (*)     Creatinine <0.3 (*)     Total Protein 8.7 (*)     Albumin 3.4 (*)     Alkaline Phosphatase 186 (*)     ALT 58 (*)     All other components within normal limits   URINALYSIS, REFLEX TO URINE CULTURE - Abnormal; Notable for the following components:    Appearance, UA Hazy (*)     Protein, UA Trace (*)     Leukocytes, UA 3+ (*)     All other components within normal limits    Narrative:     Specimen Source->Urine   URINALYSIS MICROSCOPIC - Abnormal; Notable for the following components:    WBC, UA >100 (*)     Bacteria Many (*)     Hyaline Casts, UA 13 (*)     All other components within normal limits    Narrative:     Specimen Source->Urine   CULTURE, BLOOD   CULTURE, BLOOD   CULTURE, URINE   SARS-COV-2 RNA AMPLIFICATION, QUAL   LACTIC ACID, PLASMA   TROPONIN I   CK-MB   CK    MAGNESIUM          Imaging Results              X-Ray Chest AP Portable (Preliminary result)  Result time 10/20/22 22:26:25      ED Interpretation by Rudy Sanz MD (10/20/22 22:26:25, [facility] - Emergency Dept, Emergency Medicine)    No acute process                                     Medications   albuterol (PROVENTIL) 2.5 mg /3 mL (0.083 %) nebulizer solution (  Given 10/20/22 2100)   albuterol (PROVENTIL) 2.5 mg /3 mL (0.083 %) nebulizer solution (  Given 10/20/22 2100)   albuterol (PROVENTIL) 2.5 mg /3 mL (0.083 %) nebulizer solution (  Given 10/20/22 2100)   albuterol (PROVENTIL) 2.5 mg /3 mL (0.083 %) nebulizer solution (  Given 10/20/22 2100)   cefTRIAXone (ROCEPHIN) 1 g/50 mL D5W IVPB (0 g Intravenous Stopped 10/21/22 0041)   sodium chloride 0.9% bolus 500 mL (0 mLs Intravenous Stopped 10/21/22 0126)     Medical Decision Making:   History:   Old Medical Records: I decided to obtain old medical records.  Clinical Tests:   Lab Tests: Reviewed  Radiological Study: Reviewed  Medical Tests: Reviewed  ED Management:  Patient presents with reportedly hyperkalemia facility.  Patient has normal potassium here.  Lungs are clear to auscultation bilaterally.  Hyperkalemia dose of albuterol given for possible hyperkalemia on [place] labs.  Potassium was 6.3.  This most likely fictitious due to hemolysis.  Patient was somewhat hypothermic.  She has evidence of urinary tract infection.  Rocephin initiated.  Will give prescription for Ceftin.  [place] can change antibiotic depending on her past urine culture results.    Patient does have hypothermia here.  Discussed with [facility].  Patient is on a warming blanket due to chronic hypothermia.  Patient blood pressure essentially at baseline after reviewing old records.  Clinically patient is not septic but has a urinary tract infection.  Will cover with antibiotics.                        Clinical Impression:   Final diagnoses:  [N39.0] Urinary  tract infection without hematuria, site unspecified (Primary)        ED Disposition Condition    Discharge Stable          ED Prescriptions       Medication Sig Dispense Start Date End Date Auth. Provider    cefUROXime (CEFTIN) 500 MG tablet Take 1 tablet (500 mg total) by mouth 2 (two) times daily. 20 tablet 10/20/2022 -- Rudy Sanz MD          Follow-up Information       Follow up With Specialties Details Why Contact Info Additional Information    [facility] - Emergency Dept Emergency Medicine  If symptoms worsen [address] [id number] [phone] 1st floor             Rudy Sanz MD  10/20/22 2486       Rudy Sanz MD  10/21/22 9058

## 2022-10-21 NOTE — DISCHARGE INSTRUCTIONS
Facility can change antibiotics according to there resistance patterns and past culture results.    The potassium here was normal.

## 2022-10-22 LAB — BACTERIA UR CULT: ABNORMAL

## 2022-10-25 LAB — BACTERIA BLD CULT: NORMAL

## 2022-10-26 LAB — BACTERIA BLD CULT: NORMAL

## 2022-10-28 LAB
BACTERIA SPEC AEROBE CULT: ABNORMAL
BACTERIA SPEC AEROBE CULT: ABNORMAL
GRAM STN SPEC: ABNORMAL

## 2022-11-21 PROBLEM — J96.11 CHRONIC RESPIRATORY FAILURE WITH HYPOXIA: Status: RESOLVED | Noted: 2021-05-10 | Resolved: 2022-11-21

## 2022-11-28 PROBLEM — A41.9 SEPSIS: Status: RESOLVED | Noted: 2021-04-22 | Resolved: 2022-11-28

## 2023-01-13 ENCOUNTER — LAB VISIT (OUTPATIENT)
Dept: LAB | Facility: HOSPITAL | Age: 63
End: 2023-01-13
Attending: NURSE PRACTITIONER
Payer: MEDICARE

## 2023-01-13 DIAGNOSIS — N39.0 URINARY TRACT INFECTION, SITE NOT SPECIFIED: Primary | ICD-10-CM

## 2023-01-13 LAB
ANION GAP SERPL CALC-SCNC: 15 MMOL/L (ref 8–16)
BUN SERPL-MCNC: 25 MG/DL (ref 6–20)
CALCIUM SERPL-MCNC: 9.6 MG/DL (ref 8.7–10.5)
CHLORIDE SERPL-SCNC: 106 MMOL/L (ref 95–110)
CO2 SERPL-SCNC: 21 MMOL/L (ref 23–29)
CREAT SERPL-MCNC: 0.5 MG/DL (ref 0.5–1.4)
EST. GFR  (NO RACE VARIABLE): >60 ML/MIN/1.73 M^2
GLUCOSE SERPL-MCNC: 84 MG/DL (ref 70–110)
POTASSIUM SERPL-SCNC: 4.3 MMOL/L (ref 3.5–5.1)
SODIUM SERPL-SCNC: 142 MMOL/L (ref 136–145)

## 2023-01-13 PROCEDURE — 36415 COLL VENOUS BLD VENIPUNCTURE: CPT | Performed by: NURSE PRACTITIONER

## 2023-01-13 PROCEDURE — 80048 BASIC METABOLIC PNL TOTAL CA: CPT | Performed by: NURSE PRACTITIONER

## 2023-05-03 NOTE — TRANSFER OF CARE
Anesthesia Transfer of Care Note    Patient: Genna Felix    Procedure(s) Performed: * No procedures listed *    Patient location: ICU    Anesthesia Type: general    Transport from OR: Transported from OR on 2-3 L/min O2 by NC with adequate spontaneous ventilation    Post pain: adequate analgesia    Post assessment: no apparent anesthetic complications and tolerated procedure well    Post vital signs: stable    Level of consciousness: awake, alert and oriented    Nausea/Vomiting: no nausea/vomiting    Complications: none    Transfer of care protocol was followed      Last vitals:   Visit Vitals  /68   Pulse 84   Temp 36 °C (96.8 °F)   Resp (!) 32   Ht 5' (1.524 m)   Wt 70 kg (154 lb 5.2 oz)   SpO2 97%   BMI 30.14 kg/m²      03-May-2023 13:08

## 2023-06-03 ENCOUNTER — LAB VISIT (OUTPATIENT)
Dept: LAB | Facility: HOSPITAL | Age: 63
End: 2023-06-03
Attending: FAMILY MEDICINE
Payer: MEDICARE

## 2023-06-03 DIAGNOSIS — G93.1: Primary | ICD-10-CM

## 2023-06-03 PROCEDURE — 87070 CULTURE OTHR SPECIMN AEROBIC: CPT | Performed by: FAMILY MEDICINE

## 2023-06-03 PROCEDURE — 87186 SC STD MICRODIL/AGAR DIL: CPT | Mod: 59 | Performed by: FAMILY MEDICINE

## 2023-06-03 PROCEDURE — 87205 SMEAR GRAM STAIN: CPT | Performed by: FAMILY MEDICINE

## 2023-06-03 PROCEDURE — 87077 CULTURE AEROBIC IDENTIFY: CPT | Performed by: FAMILY MEDICINE

## 2023-06-04 ENCOUNTER — LAB VISIT (OUTPATIENT)
Dept: LAB | Facility: HOSPITAL | Age: 63
End: 2023-06-04
Attending: FAMILY MEDICINE
Payer: MEDICARE

## 2023-06-04 DIAGNOSIS — Z93.0 TRACHEOSTOMY STATUS: ICD-10-CM

## 2023-06-04 DIAGNOSIS — Z86.74 PERSONAL HISTORY OF SUDDEN CARDIAC ARREST: ICD-10-CM

## 2023-06-04 DIAGNOSIS — K74.60 CIRRHOSIS: ICD-10-CM

## 2023-06-04 DIAGNOSIS — R33.9 RETENTION OF URINE, UNSPECIFIED: ICD-10-CM

## 2023-06-04 DIAGNOSIS — J45.901 EXTRINSIC ASTHMA WITH EXACERBATION: ICD-10-CM

## 2023-06-04 DIAGNOSIS — G93.1 ANOXIC BRAIN DAMAGE: ICD-10-CM

## 2023-06-04 DIAGNOSIS — D63.8 MEGALOBLASTIC ANEMIA DUE TO FISH TAPEWORM: ICD-10-CM

## 2023-06-04 DIAGNOSIS — Z74.01 BED CONFINEMENT STATUS: ICD-10-CM

## 2023-06-04 DIAGNOSIS — Z93.1 GASTROSTOMY STATUS: ICD-10-CM

## 2023-06-04 DIAGNOSIS — R06.02 SHORTNESS OF BREATH: ICD-10-CM

## 2023-06-04 DIAGNOSIS — I10 ESSENTIAL HYPERTENSION, MALIGNANT: ICD-10-CM

## 2023-06-04 DIAGNOSIS — B70.0 MEGALOBLASTIC ANEMIA DUE TO FISH TAPEWORM: ICD-10-CM

## 2023-06-04 DIAGNOSIS — L89.312 STAGE II PRESSURE ULCER OF RIGHT BUTTOCK: Primary | ICD-10-CM

## 2023-06-04 LAB
ANION GAP SERPL CALC-SCNC: 14 MMOL/L (ref 8–16)
BASOPHILS # BLD AUTO: 0.02 K/UL (ref 0–0.2)
BASOPHILS NFR BLD: 0.2 % (ref 0–1.9)
BUN SERPL-MCNC: 25 MG/DL (ref 6–20)
CALCIUM SERPL-MCNC: 8.1 MG/DL (ref 8.7–10.5)
CHLORIDE SERPL-SCNC: 102 MMOL/L (ref 95–110)
CO2 SERPL-SCNC: 27 MMOL/L (ref 23–29)
CREAT SERPL-MCNC: 0.6 MG/DL (ref 0.5–1.4)
DIFFERENTIAL METHOD: ABNORMAL
EOSINOPHIL # BLD AUTO: 0.6 K/UL (ref 0–0.5)
EOSINOPHIL NFR BLD: 7.1 % (ref 0–8)
ERYTHROCYTE [DISTWIDTH] IN BLOOD BY AUTOMATED COUNT: 20.4 % (ref 11.5–14.5)
EST. GFR  (NO RACE VARIABLE): >60 ML/MIN/1.73 M^2
GLUCOSE SERPL-MCNC: 97 MG/DL (ref 70–110)
HCT VFR BLD AUTO: 24.9 % (ref 37–48.5)
HGB BLD-MCNC: 7.6 G/DL (ref 12–16)
IMM GRANULOCYTES # BLD AUTO: 0.05 K/UL (ref 0–0.04)
IMM GRANULOCYTES NFR BLD AUTO: 0.6 % (ref 0–0.5)
LYMPHOCYTES # BLD AUTO: 1.5 K/UL (ref 1–4.8)
LYMPHOCYTES NFR BLD: 19.2 % (ref 18–48)
MCH RBC QN AUTO: 27 PG (ref 27–31)
MCHC RBC AUTO-ENTMCNC: 30.5 G/DL (ref 32–36)
MCV RBC AUTO: 89 FL (ref 82–98)
MONOCYTES # BLD AUTO: 0.8 K/UL (ref 0.3–1)
MONOCYTES NFR BLD: 10 % (ref 4–15)
NEUTROPHILS # BLD AUTO: 5 K/UL (ref 1.8–7.7)
NEUTROPHILS NFR BLD: 62.9 % (ref 38–73)
NRBC BLD-RTO: 1 /100 WBC
PLATELET # BLD AUTO: 183 K/UL (ref 150–450)
PLATELET BLD QL SMEAR: ABNORMAL
PMV BLD AUTO: 11.8 FL (ref 9.2–12.9)
POTASSIUM SERPL-SCNC: 4.3 MMOL/L (ref 3.5–5.1)
RBC # BLD AUTO: 2.81 M/UL (ref 4–5.4)
SODIUM SERPL-SCNC: 143 MMOL/L (ref 136–145)
WBC # BLD AUTO: 8.01 K/UL (ref 3.9–12.7)

## 2023-06-04 PROCEDURE — 85025 COMPLETE CBC W/AUTO DIFF WBC: CPT | Performed by: FAMILY MEDICINE

## 2023-06-04 PROCEDURE — 36415 COLL VENOUS BLD VENIPUNCTURE: CPT | Performed by: FAMILY MEDICINE

## 2023-06-04 PROCEDURE — 80048 BASIC METABOLIC PNL TOTAL CA: CPT | Performed by: FAMILY MEDICINE

## 2023-06-07 LAB
BACTERIA SPEC AEROBE CULT: ABNORMAL
GRAM STN SPEC: ABNORMAL

## 2023-06-12 ENCOUNTER — HOSPITAL ENCOUNTER (EMERGENCY)
Facility: HOSPITAL | Age: 63
Discharge: NURSING, REHAB OR LONG-TERM CARE | End: 2023-06-12
Attending: EMERGENCY MEDICINE
Payer: MEDICARE

## 2023-06-12 VITALS
TEMPERATURE: 98 F | WEIGHT: 170 LBS | RESPIRATION RATE: 22 BRPM | OXYGEN SATURATION: 96 % | DIASTOLIC BLOOD PRESSURE: 70 MMHG | HEART RATE: 78 BPM | SYSTOLIC BLOOD PRESSURE: 112 MMHG | BODY MASS INDEX: 33.38 KG/M2 | HEIGHT: 60 IN

## 2023-06-12 DIAGNOSIS — K94.23 PEG TUBE MALFUNCTION: Primary | ICD-10-CM

## 2023-06-12 PROCEDURE — 99283 EMERGENCY DEPT VISIT LOW MDM: CPT | Mod: 25

## 2023-06-12 PROCEDURE — 43762 RPLC GTUBE NO REVJ TRC: CPT

## 2023-06-12 NOTE — ED PROVIDER NOTES
Encounter Date: 6/12/2023       History     Chief Complaint   Patient presents with    peg tube replacement     Peg tube brought to facility with patient     Patient presents complaining of needing PEG tube replaced.  Patient comes from long term care facility.  Nursing home noticed PEG tube fell out this morning.    Review of patient's allergies indicates:  No Known Allergies  Past Medical History:   Diagnosis Date    Anoxic brain damage 07/2020    Bedbound 07/2020    Cardiac arrest 07/2020    Cirrhosis     GERD (gastroesophageal reflux disease)     Hemangioma of intra-abdominal structure     Hypertension     Nursing home resident     Protein calorie malnutrition     Pulmonary embolus     Respiratory distress     S/P percutaneous endoscopic gastrostomy (PEG) tube placement 07/2020    Total self-care deficit 07/2020    Tracheostomy dependence     7/2020     Past Surgical History:   Procedure Laterality Date    PEG W/TRACHEOSTOMY PLACEMENT  07/27/2020    SKIN GRAFT      buttocks     Family History   Problem Relation Age of Onset    No Known Problems Mother     No Known Problems Father      Social History     Tobacco Use    Smoking status: Never    Smokeless tobacco: Never   Substance Use Topics    Alcohol use: Not Currently    Drug use: Not Currently     Review of Systems    Physical Exam     Initial Vitals [06/12/23 0829]   BP Pulse Resp Temp SpO2   110/73 71 (!) 22 98.9 °F (37.2 °C) 96 %      MAP       --         Physical Exam    Abdominal:   Ostomy is without erythema warmth or cellulitis.         ED Course   Procedures  Labs Reviewed - No data to display       Imaging Results              X-Ray Abdomen AP 1 View (KUB) (In process)                      Medications - No data to display  Medical Decision Making:   ED Management:  Procedure note:    Ostomy was prepped and draped sterile.  Peg tube was placed without issue.  20 cc of normal saline was used to inflate the tube.  X-ray confirmed placement.  Patient  tolerated without complication or issue.                        Clinical Impression:   Final diagnoses:  [K94.23] PEG tube malfunction (Primary)        ED Disposition Condition    Discharge Stable          ED Prescriptions    None       Follow-up Information       Follow up With Specialties Details Why Contact Info    Your doctor in 1 week  Schedule an appointment as soon as possible for a visit                Stu Jade MD  06/12/23 0826

## 2023-07-06 ENCOUNTER — HOSPITAL ENCOUNTER (INPATIENT)
Facility: HOSPITAL | Age: 63
LOS: 4 days | DRG: 698 | End: 2023-07-10
Attending: EMERGENCY MEDICINE | Admitting: INTERNAL MEDICINE
Payer: MEDICARE

## 2023-07-06 DIAGNOSIS — R79.89 LOW SERUM CORTISOL LEVEL: ICD-10-CM

## 2023-07-06 DIAGNOSIS — R40.3 PERSISTENT VEGETATIVE STATE: ICD-10-CM

## 2023-07-06 DIAGNOSIS — T83.511S URINARY TRACT INFECTION ASSOCIATED WITH INDWELLING URETHRAL CATHETER, SEQUELA: ICD-10-CM

## 2023-07-06 DIAGNOSIS — L89.153 DECUBITUS ULCER OF SACRAL REGION, STAGE 3: ICD-10-CM

## 2023-07-06 DIAGNOSIS — Z99.11 VENTILATOR DEPENDENCE: ICD-10-CM

## 2023-07-06 DIAGNOSIS — Z16.24 MULTIPLE DRUG RESISTANT ORGANISM (MDRO) CULTURE POSITIVE: ICD-10-CM

## 2023-07-06 DIAGNOSIS — I95.9 HYPOTENSION: ICD-10-CM

## 2023-07-06 DIAGNOSIS — T83.511A URINARY TRACT INFECTION ASSOCIATED WITH INDWELLING URETHRAL CATHETER, INITIAL ENCOUNTER: ICD-10-CM

## 2023-07-06 DIAGNOSIS — N39.0 URINARY TRACT INFECTION ASSOCIATED WITH INDWELLING URETHRAL CATHETER, SEQUELA: ICD-10-CM

## 2023-07-06 DIAGNOSIS — N39.0 URINARY TRACT INFECTION ASSOCIATED WITH INDWELLING URETHRAL CATHETER, INITIAL ENCOUNTER: ICD-10-CM

## 2023-07-06 DIAGNOSIS — T68.XXXA HYPOTHERMIA, INITIAL ENCOUNTER: Primary | ICD-10-CM

## 2023-07-06 LAB
ALBUMIN SERPL BCP-MCNC: 3.3 G/DL (ref 3.5–5.2)
ALP SERPL-CCNC: 165 U/L (ref 55–135)
ALT SERPL W/O P-5'-P-CCNC: 34 U/L (ref 10–44)
AMORPH CRY URNS QL MICRO: ABNORMAL
ANION GAP SERPL CALC-SCNC: 9 MMOL/L (ref 8–16)
AST SERPL-CCNC: 29 U/L (ref 10–40)
BACTERIA #/AREA URNS HPF: ABNORMAL /HPF
BASOPHILS # BLD AUTO: 0.02 K/UL (ref 0–0.2)
BASOPHILS NFR BLD: 0.3 % (ref 0–1.9)
BILIRUB SERPL-MCNC: 0.7 MG/DL (ref 0.1–1)
BILIRUB UR QL STRIP: NEGATIVE
BUN SERPL-MCNC: 27 MG/DL (ref 6–20)
CALCIUM SERPL-MCNC: 9.1 MG/DL (ref 8.7–10.5)
CHLORIDE SERPL-SCNC: 101 MMOL/L (ref 95–110)
CLARITY UR: ABNORMAL
CO2 SERPL-SCNC: 26 MMOL/L (ref 23–29)
COLOR UR: YELLOW
CORTIS SERPL-MCNC: 11.8 UG/DL
CREAT SERPL-MCNC: 0.3 MG/DL (ref 0.5–1.4)
DIFFERENTIAL METHOD: ABNORMAL
EOSINOPHIL # BLD AUTO: 0.8 K/UL (ref 0–0.5)
EOSINOPHIL NFR BLD: 13.8 % (ref 0–8)
ERYTHROCYTE [DISTWIDTH] IN BLOOD BY AUTOMATED COUNT: 20.6 % (ref 11.5–14.5)
EST. GFR  (NO RACE VARIABLE): >60 ML/MIN/1.73 M^2
GLUCOSE SERPL-MCNC: 93 MG/DL (ref 70–110)
GLUCOSE UR QL STRIP: NEGATIVE
HCT VFR BLD AUTO: 29.1 % (ref 37–48.5)
HGB BLD-MCNC: 8.3 G/DL (ref 12–16)
HGB UR QL STRIP: ABNORMAL
HYALINE CASTS #/AREA URNS LPF: 15 /LPF
IMM GRANULOCYTES # BLD AUTO: 0.01 K/UL (ref 0–0.04)
IMM GRANULOCYTES NFR BLD AUTO: 0.2 % (ref 0–0.5)
KETONES UR QL STRIP: NEGATIVE
LACTATE SERPL-SCNC: 0.8 MMOL/L (ref 0.5–1.9)
LACTATE SERPL-SCNC: 1.1 MMOL/L (ref 0.5–1.9)
LEUKOCYTE ESTERASE UR QL STRIP: ABNORMAL
LIPASE SERPL-CCNC: 26 U/L (ref 4–60)
LYMPHOCYTES # BLD AUTO: 1.8 K/UL (ref 1–4.8)
LYMPHOCYTES NFR BLD: 30.3 % (ref 18–48)
MCH RBC QN AUTO: 25.5 PG (ref 27–31)
MCHC RBC AUTO-ENTMCNC: 28.5 G/DL (ref 32–36)
MCV RBC AUTO: 89 FL (ref 82–98)
MICROSCOPIC COMMENT: ABNORMAL
MONOCYTES # BLD AUTO: 0.4 K/UL (ref 0.3–1)
MONOCYTES NFR BLD: 6.9 % (ref 4–15)
MRSA SCREEN BY PCR: NEGATIVE
NEUTROPHILS # BLD AUTO: 2.9 K/UL (ref 1.8–7.7)
NEUTROPHILS NFR BLD: 48.5 % (ref 38–73)
NITRITE UR QL STRIP: NEGATIVE
NRBC BLD-RTO: 0 /100 WBC
PH UR STRIP: 8 [PH] (ref 5–8)
PLATELET # BLD AUTO: 277 K/UL (ref 150–450)
PMV BLD AUTO: 12 FL (ref 9.2–12.9)
POTASSIUM SERPL-SCNC: 3.5 MMOL/L (ref 3.5–5.1)
PROCALCITONIN SERPL IA-MCNC: 0.07 NG/ML
PROT SERPL-MCNC: 9.5 G/DL (ref 6–8.4)
PROT UR QL STRIP: ABNORMAL
RBC # BLD AUTO: 3.26 M/UL (ref 4–5.4)
RBC #/AREA URNS HPF: 3 /HPF (ref 0–4)
SODIUM SERPL-SCNC: 136 MMOL/L (ref 136–145)
SP GR UR STRIP: 1.01 (ref 1–1.03)
SQUAMOUS #/AREA URNS HPF: 1 /HPF
TROPONIN I SERPL HS-MCNC: 8.7 PG/ML (ref 0–14.9)
TSH SERPL DL<=0.005 MIU/L-ACNC: 2.51 UIU/ML (ref 0.34–5.6)
URN SPEC COLLECT METH UR: ABNORMAL
UROBILINOGEN UR STRIP-ACNC: NEGATIVE EU/DL
WBC # BLD AUTO: 5.94 K/UL (ref 3.9–12.7)
WBC #/AREA URNS HPF: 95 /HPF (ref 0–5)

## 2023-07-06 PROCEDURE — 25000003 PHARM REV CODE 250: Performed by: EMERGENCY MEDICINE

## 2023-07-06 PROCEDURE — 25500020 PHARM REV CODE 255: Performed by: EMERGENCY MEDICINE

## 2023-07-06 PROCEDURE — 82533 TOTAL CORTISOL: CPT | Performed by: INTERNAL MEDICINE

## 2023-07-06 PROCEDURE — 84443 ASSAY THYROID STIM HORMONE: CPT | Performed by: EMERGENCY MEDICINE

## 2023-07-06 PROCEDURE — 99900026 HC AIRWAY MAINTENANCE (STAT)

## 2023-07-06 PROCEDURE — 84484 ASSAY OF TROPONIN QUANT: CPT | Performed by: EMERGENCY MEDICINE

## 2023-07-06 PROCEDURE — 51701 INSERT BLADDER CATHETER: CPT

## 2023-07-06 PROCEDURE — 94003 VENT MGMT INPAT SUBQ DAY: CPT

## 2023-07-06 PROCEDURE — 87186 SC STD MICRODIL/AGAR DIL: CPT | Performed by: EMERGENCY MEDICINE

## 2023-07-06 PROCEDURE — 93010 EKG 12-LEAD: ICD-10-PCS | Mod: ,,, | Performed by: INTERNAL MEDICINE

## 2023-07-06 PROCEDURE — 83605 ASSAY OF LACTIC ACID: CPT | Mod: 91 | Performed by: INTERNAL MEDICINE

## 2023-07-06 PROCEDURE — 85025 COMPLETE CBC W/AUTO DIFF WBC: CPT | Performed by: EMERGENCY MEDICINE

## 2023-07-06 PROCEDURE — 93010 ELECTROCARDIOGRAM REPORT: CPT | Mod: ,,, | Performed by: INTERNAL MEDICINE

## 2023-07-06 PROCEDURE — 99900031 HC PATIENT EDUCATION (STAT)

## 2023-07-06 PROCEDURE — 87086 URINE CULTURE/COLONY COUNT: CPT | Performed by: EMERGENCY MEDICINE

## 2023-07-06 PROCEDURE — 20000000 HC ICU ROOM

## 2023-07-06 PROCEDURE — 87641 MR-STAPH DNA AMP PROBE: CPT | Performed by: INTERNAL MEDICINE

## 2023-07-06 PROCEDURE — 83690 ASSAY OF LIPASE: CPT | Performed by: EMERGENCY MEDICINE

## 2023-07-06 PROCEDURE — 80053 COMPREHEN METABOLIC PANEL: CPT | Performed by: EMERGENCY MEDICINE

## 2023-07-06 PROCEDURE — 63600175 PHARM REV CODE 636 W HCPCS: Performed by: INTERNAL MEDICINE

## 2023-07-06 PROCEDURE — 87205 SMEAR GRAM STAIN: CPT | Performed by: EMERGENCY MEDICINE

## 2023-07-06 PROCEDURE — 83605 ASSAY OF LACTIC ACID: CPT | Performed by: EMERGENCY MEDICINE

## 2023-07-06 PROCEDURE — 84145 PROCALCITONIN (PCT): CPT | Performed by: INTERNAL MEDICINE

## 2023-07-06 PROCEDURE — 81001 URINALYSIS AUTO W/SCOPE: CPT | Performed by: EMERGENCY MEDICINE

## 2023-07-06 PROCEDURE — 94799 UNLISTED PULMONARY SVC/PX: CPT

## 2023-07-06 PROCEDURE — 36415 COLL VENOUS BLD VENIPUNCTURE: CPT | Performed by: INTERNAL MEDICINE

## 2023-07-06 PROCEDURE — 99285 EMERGENCY DEPT VISIT HI MDM: CPT | Mod: 25

## 2023-07-06 PROCEDURE — 25000003 PHARM REV CODE 250: Performed by: INTERNAL MEDICINE

## 2023-07-06 PROCEDURE — 99900035 HC TECH TIME PER 15 MIN (STAT)

## 2023-07-06 PROCEDURE — 94002 VENT MGMT INPAT INIT DAY: CPT

## 2023-07-06 PROCEDURE — 96360 HYDRATION IV INFUSION INIT: CPT

## 2023-07-06 PROCEDURE — 27100171 HC OXYGEN HIGH FLOW UP TO 24 HOURS

## 2023-07-06 PROCEDURE — 87070 CULTURE OTHR SPECIMN AEROBIC: CPT | Performed by: EMERGENCY MEDICINE

## 2023-07-06 PROCEDURE — 87040 BLOOD CULTURE FOR BACTERIA: CPT | Performed by: EMERGENCY MEDICINE

## 2023-07-06 PROCEDURE — 94640 AIRWAY INHALATION TREATMENT: CPT

## 2023-07-06 PROCEDURE — 87154 CUL TYP ID BLD PTHGN 6+ TRGT: CPT | Performed by: EMERGENCY MEDICINE

## 2023-07-06 PROCEDURE — 36415 COLL VENOUS BLD VENIPUNCTURE: CPT | Performed by: EMERGENCY MEDICINE

## 2023-07-06 PROCEDURE — 25000242 PHARM REV CODE 250 ALT 637 W/ HCPCS: Performed by: INTERNAL MEDICINE

## 2023-07-06 PROCEDURE — 99223 1ST HOSP IP/OBS HIGH 75: CPT | Mod: ,,, | Performed by: INTERNAL MEDICINE

## 2023-07-06 PROCEDURE — 87077 CULTURE AEROBIC IDENTIFY: CPT | Performed by: EMERGENCY MEDICINE

## 2023-07-06 PROCEDURE — 99223 PR INITIAL HOSPITAL CARE,LEVL III: ICD-10-PCS | Mod: ,,, | Performed by: INTERNAL MEDICINE

## 2023-07-06 PROCEDURE — 93005 ELECTROCARDIOGRAM TRACING: CPT | Performed by: INTERNAL MEDICINE

## 2023-07-06 PROCEDURE — 94761 N-INVAS EAR/PLS OXIMETRY MLT: CPT

## 2023-07-06 PROCEDURE — 63600175 PHARM REV CODE 636 W HCPCS: Performed by: EMERGENCY MEDICINE

## 2023-07-06 RX ORDER — IPRATROPIUM BROMIDE 0.5 MG/2.5ML
0.5 SOLUTION RESPIRATORY (INHALATION) EVERY 8 HOURS
Status: DISCONTINUED | OUTPATIENT
Start: 2023-07-06 | End: 2023-07-10 | Stop reason: HOSPADM

## 2023-07-06 RX ORDER — ENOXAPARIN SODIUM 100 MG/ML
40 INJECTION SUBCUTANEOUS EVERY 24 HOURS
Status: DISCONTINUED | OUTPATIENT
Start: 2023-07-06 | End: 2023-07-10 | Stop reason: HOSPADM

## 2023-07-06 RX ORDER — ONDANSETRON 4 MG/1
8 TABLET, FILM COATED ORAL 2 TIMES DAILY
Status: ON HOLD | COMMUNITY
End: 2023-07-10

## 2023-07-06 RX ORDER — MEROPENEM AND SODIUM CHLORIDE 1 G/50ML
1 INJECTION, SOLUTION INTRAVENOUS
Status: DISCONTINUED | OUTPATIENT
Start: 2023-07-06 | End: 2023-07-06

## 2023-07-06 RX ORDER — CARBAMIDE PEROXIDE 6.5 %
1 DROPS OTIC (EAR) DAILY PRN
COMMUNITY

## 2023-07-06 RX ORDER — ACETAMINOPHEN 325 MG/1
650 TABLET ORAL EVERY 4 HOURS PRN
Status: DISCONTINUED | OUTPATIENT
Start: 2023-07-06 | End: 2023-07-10 | Stop reason: HOSPADM

## 2023-07-06 RX ORDER — ENOXAPARIN SODIUM 100 MG/ML
30 INJECTION SUBCUTANEOUS EVERY 24 HOURS
Status: DISCONTINUED | OUTPATIENT
Start: 2023-07-06 | End: 2023-07-06

## 2023-07-06 RX ORDER — LEVALBUTEROL INHALATION SOLUTION 1.25 MG/3ML
1.25 SOLUTION RESPIRATORY (INHALATION) EVERY 8 HOURS
Status: DISCONTINUED | OUTPATIENT
Start: 2023-07-06 | End: 2023-07-10 | Stop reason: HOSPADM

## 2023-07-06 RX ORDER — MUPIROCIN 20 MG/G
OINTMENT TOPICAL 2 TIMES DAILY
Status: DISCONTINUED | OUTPATIENT
Start: 2023-07-06 | End: 2023-07-10 | Stop reason: HOSPADM

## 2023-07-06 RX ORDER — IPRATROPIUM BROMIDE AND ALBUTEROL SULFATE 2.5; .5 MG/3ML; MG/3ML
3 SOLUTION RESPIRATORY (INHALATION) EVERY 6 HOURS
Status: DISCONTINUED | OUTPATIENT
Start: 2023-07-06 | End: 2023-07-06

## 2023-07-06 RX ORDER — SODIUM,POTASSIUM PHOSPHATES 280-250MG
2 POWDER IN PACKET (EA) ORAL
Status: DISCONTINUED | OUTPATIENT
Start: 2023-07-06 | End: 2023-07-10 | Stop reason: HOSPADM

## 2023-07-06 RX ORDER — CYCLOSPORINE 0.5 MG/ML
1 EMULSION OPHTHALMIC 2 TIMES DAILY
COMMUNITY

## 2023-07-06 RX ORDER — MEROPENEM AND SODIUM CHLORIDE 500 MG/50ML
500 INJECTION, SOLUTION INTRAVENOUS
Status: DISCONTINUED | OUTPATIENT
Start: 2023-07-06 | End: 2023-07-06

## 2023-07-06 RX ORDER — MIDODRINE HYDROCHLORIDE 2.5 MG/1
10 TABLET ORAL 3 TIMES DAILY
Status: DISCONTINUED | OUTPATIENT
Start: 2023-07-06 | End: 2023-07-10 | Stop reason: HOSPADM

## 2023-07-06 RX ORDER — SODIUM CHLORIDE 9 MG/ML
INJECTION, SOLUTION INTRAVENOUS CONTINUOUS
Status: DISCONTINUED | OUTPATIENT
Start: 2023-07-06 | End: 2023-07-09

## 2023-07-06 RX ORDER — DIPHENHYDRAMINE HCL 25 MG
1 CAPSULE ORAL 4 TIMES DAILY PRN
COMMUNITY

## 2023-07-06 RX ADMIN — LEVALBUTEROL HYDROCHLORIDE 1.25 MG: 1.25 SOLUTION RESPIRATORY (INHALATION) at 04:07

## 2023-07-06 RX ADMIN — IOHEXOL 100 ML: 350 INJECTION, SOLUTION INTRAVENOUS at 02:07

## 2023-07-06 RX ADMIN — SODIUM CHLORIDE: 0.9 INJECTION, SOLUTION INTRAVENOUS at 06:07

## 2023-07-06 RX ADMIN — MEROPENEM 1 G: 1 INJECTION, POWDER, FOR SOLUTION INTRAVENOUS at 04:07

## 2023-07-06 RX ADMIN — ENOXAPARIN SODIUM 40 MG: 100 INJECTION SUBCUTANEOUS at 09:07

## 2023-07-06 RX ADMIN — MUPIROCIN 1 G: 20 OINTMENT TOPICAL at 09:07

## 2023-07-06 RX ADMIN — IPRATROPIUM BROMIDE 0.5 MG: 0.5 SOLUTION RESPIRATORY (INHALATION) at 04:07

## 2023-07-06 RX ADMIN — SODIUM CHLORIDE 1000 ML: 0.9 INJECTION, SOLUTION INTRAVENOUS at 12:07

## 2023-07-06 RX ADMIN — MIDODRINE HYDROCHLORIDE 10 MG: 2.5 TABLET ORAL at 09:07

## 2023-07-06 NOTE — CARE UPDATE
07/06/23 0936   Patient Assessment/Suction   Level of Consciousness (AVPU) alert   Respiratory Effort Mild;Labored   Expansion/Accessory Muscles/Retractions accessory muscle use;expansion symmetric   All Lung Fields Breath Sounds wheezes, inspiratory;wheezes, expiratory   Rhythm/Pattern, Respiratory pattern regular;depth regular   Cough Frequency with stimulation   Cough Type assisted   Suction Method tracheal;sample obtained and sent to lab   Suction Pressure (mmHg) -120 mmHg   $ Suction Charges Inline Suction Procedure Stat Charge   Secretions Amount small   Secretions Color white;green   Secretions Characteristics thick   Sputum Collection sample obtained per suctioning   PRE-TX-O2   Device (Oxygen Therapy) ventilator   $ Is the patient on High Flow Oxygen? Yes   Oxygen Concentration (%) 40   SpO2 98 %   Pulse Oximetry Type Continuous   $ Pulse Oximetry - Multiple Charge Pulse Oximetry - Multiple   Pulse (!) 52   Resp (!) 28   Adult Surgical Airway Shiley Cuffed 6.0/ 75mm   No placement date or time found.   Present Prior to Hospital Arrival?: Yes  Type: Tracheostomy  Brand: Shiley  Airway Device Style: Cuffed  Airway Device Size: 6.0/ 75mm   Cuff Inflation? Inflated   Status Secured   Site Assessment Clean;Dry;No bleeding;No drainage   Ties Assessment Clean   Vent Select   $ Ventilator Initial 1   $ Ventilator Subsequent 1   Charged w/in last 24h YES   Preset Conventional Ventilator Settings   Vent Type    Ventilation Type VC   Vent Mode A/C   Set Rate 20 BPM   Vt Set 400 mL   PEEP/CPAP 8 cmH20   Pressure Support 0 cmH20   Waveform RAMP   Peak Flow 60 L/min   Plateau Set/Insp. Hold (sec) 0   Trigger Sensitivity Flow/I-Trigger 3 L/min   Patient Ventilator Parameters   Resp Rate Total 34 br/min   Peak Airway Pressure 41 cmH20   Mean Airway Pressure 21 cmH20   Plateau Pressure 0 cmH20   Exhaled Vt 316 mL   Total Ve 9.18 L/m   I:E Ratio Measured 1:1.60   Conventional Ventilator Alarms   Resp Rate High Alarm  45 br/min   Press High Alarm 55 cmH2O   Apnea Rate 20   Apnea Volume (mL) 0 mL   Apnea Oxygen Concentration  100   Apnea Flow Rate (L/min) 60   T Apnea 20 sec(s)   Ready to Wean/Extubation Screen   FIO2<=50 (chart decimal) 0.4   MV<16L (chart vol.) 9.18   PEEP <=8 (chart #) 8   Ready to Wean Parameters   F/VT Ratio<105 (RSBI) (!) 88.61

## 2023-07-06 NOTE — ED PROVIDER NOTES
Chief complaint:  Hypotension (Pt sent from [place] for eval of hypotension. Ems reports stable vitals enroute)      HPI:  Genna Felix is a 62 y.o. female coming from neuro rehab SNF s/p trach and PEG, bedbound and nonverbal, DNR per EMS, with prior hx of cardiac arrest s/p anoxic brain injury 2020, prior ESBL UTI presenting with bradycardia to 40s and hypotension to 80s, now improved upon EMS arrival and with transport.  Additional hx obtained from EMS.  History from the patient is limited.  EMS report no other new abnormalities such as recent fever, emesis, change in medicine further discussion with nursing facility staff.    ROS: As per HPI and below:  Limited secondary to patient baseline mental status.    Review of patient's allergies indicates:  No Known Allergies    Patient's Medications   New Prescriptions    No medications on file   Previous Medications    ACETAMINOPHEN (TYLENOL) 325 MG TABLET    2 tablets (650 mg total) by Per G Tube route every 4 (four) hours as needed for Pain or Temperature greater than (100.4F).    ACETYLCYSTEINE 100 MG/ML, 10%, (MUCOMYST) 100 MG/ML (10 %) NEBULIZER SOLUTION    Take 4 mLs by nebulization every 8 (eight) hours.    ALBUTEROL-IPRATROPIUM (DUO-NEB) 2.5 MG-0.5 MG/3 ML NEBULIZER SOLUTION    Take 3 mLs by nebulization every 6 (six) hours. Rescue    ARGININE/GLUTAMINE/CALCIUM BMB (XIOMARA ORAL)    1 Package by PEG Tube route 2 (two) times a day. In 8oz of water    CEFTRIAXONE (ROCEPHIN) 1 GRAM INJECTION    Inject 1 g into the muscle daily as needed.    CYCLOSPORINE (RESTASIS) 0.05 % OPHTHALMIC EMULSION    1 drop 2 (two) times daily. 0900  2100    DEXTRAN 70-HYPROMELLOSE (ARTIFICIAL TEARS,[id number],) OPHTHALMIC SOLUTION    Place 1 drop into both eyes 4 (four) times daily as needed.    ERGOCALCIFEROL (ERGOCALCIFEROL) 50,000 UNIT CAP    Take 50,000 Units by mouth every Saturday.    FERROUS SULFATE 300 MG (60 MG IRON)/5 ML SYRUP    Take 300 mg by mouth As instructed. Every  other day    LACTOSE-REDUCED FOOD (ISOSOURCE ORAL)    1.5 mg by Per G Tube route continuous. 1.5 per peg tube at 50ml/hr continuously    LINACLOTIDE (LINZESS) 145 MCG CAP CAPSULE    Take 145 mcg by mouth before breakfast.    MIDODRINE (PROAMATINE) 10 MG TABLET    1 tablet (10 mg total) by Per G Tube route 3 (three) times daily.    ONDANSETRON (ZOFRAN) 4 MG TABLET    8 mg by Per G Tube route 2 (two) times daily.    POLYETHYLENE GLYCOL (GLYCOLAX) 17 GRAM PWPK    Take 17 g by mouth daily as needed.    POLYVINYL ALCOHOL-POVIDONE (CLEAR EYES NATURAL TEARS) 0.5-0.6 % DROP    Apply 1 drop to eye daily as needed.   Modified Medications    No medications on file   Discontinued Medications    0.9 % SODIUM CHLORIDE (SODIUM CHLORIDE 0.9 %) PGBK    Inject into the vein.    ARTIFICIAL TEARS (ISOPTO TEARS) 0.5 % OPHTHALMIC SOLUTION    Place 1 drop into both eyes as needed.    CEFUROXIME (CEFTIN) 500 MG TABLET    Take 1 tablet (500 mg total) by mouth 2 (two) times daily.    CHLORHEXIDINE (PERIDEX) 0.12 % SOLUTION    Use as directed 15 mLs in the mouth or throat 2 (two) times daily.    LIDOCAINE HCL 10 MG/ML, 1%, 10 MG/ML (1 %) INJECTION    SMARTSIG:Rectally    SANTYL OINTMENT    Apply topically.       PMH:  As per HPI and below:  Past Medical History:   Diagnosis Date    Anoxic brain damage 07/2020    Bedbound 07/2020    Cardiac arrest 07/2020    Cirrhosis     GERD (gastroesophageal reflux disease)     Hemangioma of intra-abdominal structure     Hypertension     Nursing home resident     Protein calorie malnutrition     Pulmonary embolus     Respiratory distress     S/P percutaneous endoscopic gastrostomy (PEG) tube placement 07/2020    Total self-care deficit 07/2020    Tracheostomy dependence     7/2020     Past Surgical History:   Procedure Laterality Date    PEG W/TRACHEOSTOMY PLACEMENT  07/27/2020    SKIN GRAFT      buttocks       Social History     Socioeconomic History    Marital status:    Tobacco Use    Smoking  status: Never    Smokeless tobacco: Never   Substance and Sexual Activity    Alcohol use: Not Currently    Drug use: Not Currently    Sexual activity: Not Currently       Family History   Problem Relation Age of Onset    No Known Problems Mother     No Known Problems Father        Physical Exam:    Vitals:    07/06/23 1700   BP: 99/60   Pulse:    Resp:    Temp:      GENERAL:  No apparent distress.  Alert.    HEENT:  Moist mucous membranes.  Normocephalic and atraumatic.    NECK:  No swelling.  Midline trachea.  Trach in place.  Mild stridor.  Well-seated.  No surrounding erythema or discharge.  CARDIOVASCULAR:  Bradycardic with regular rhythm.  2+ radial pulses.  No murmur.  PULMONARY:  Scattered rhonchi with transmitted upper airway sounds.  No rales or wheezes.  Equal breath sounds.  No tachypnea.  ABDOMEN:  PEG tube in place.  Abdomen is markedly distended and tense.  No grimace to palpation.  No palpable hernia.  EXTREMITIES:  Warm and well perfused.  Brisk capillary refill.  1+ pitting pedal edema.  2+ DP pulses.  NEUROLOGICAL:  Patient opens eyes spontaneously with spontaneous movement of head and neck area.  No vocalization to pain.  SKIN:  No rashes or ecchymoses.    BACK:  No CVA grimace to palpation.      Labs Reviewed   CBC W/ AUTO DIFFERENTIAL - Abnormal; Notable for the following components:       Result Value    RBC 3.26 (*)     Hemoglobin 8.3 (*)     Hematocrit 29.1 (*)     MCH 25.5 (*)     MCHC 28.5 (*)     RDW 20.6 (*)     Eos # 0.8 (*)     Eosinophil % 13.8 (*)     All other components within normal limits   COMPREHENSIVE METABOLIC PANEL - Abnormal; Notable for the following components:    BUN 27 (*)     Creatinine 0.3 (*)     Total Protein 9.5 (*)     Albumin 3.3 (*)     Alkaline Phosphatase 165 (*)     All other components within normal limits   URINALYSIS - Abnormal; Notable for the following components:    Appearance, UA Hazy (*)     Protein, UA 1+ (*)     Occult Blood UA 2+ (*)     Leukocytes,  UA 3+ (*)     All other components within normal limits    Narrative:     Collection Type->Urine, Clean Catch  absorbed by other test UAR   URINALYSIS MICROSCOPIC - Abnormal; Notable for the following components:    WBC, UA 95 (*)     Bacteria Many (*)     Hyaline Casts, UA 15 (*)     All other components within normal limits    Narrative:     Collection Type->Urine, Clean Catch   URINALYSIS, REFLEX TO URINE CULTURE - Abnormal; Notable for the following components:    Appearance, UA Hazy (*)     Protein, UA 1+ (*)     Occult Blood UA 2+ (*)     Leukocytes, UA 3+ (*)     All other components within normal limits    Narrative:     Collection Type->Urine, Clean Catch   CULTURE, RESPIRATORY   CULTURE, BLOOD   CULTURE, BLOOD   CULTURE, URINE   LACTIC ACID, PLASMA   LIPASE   TROPONIN I HIGH SENSITIVITY   URINALYSIS, REFLEX TO URINE CULTURE   TSH   TSH   LACTIC ACID, PLASMA   PROCALCITONIN       Current Discharge Medication List        CONTINUE these medications which have NOT CHANGED    Details   acetylcysteine 100 mg/ml, 10%, (MUCOMYST) 100 mg/mL (10 %) nebulizer solution Take 4 mLs by nebulization every 8 (eight) hours.  Refills: 12      arginine/glutamine/calcium bmb (XIOMARA ORAL) 1 Package by PEG Tube route 2 (two) times a day. In 8oz of water      cefTRIAXone (ROCEPHIN) 1 gram injection Inject 1 g into the muscle daily as needed.      cycloSPORINE (RESTASIS) 0.05 % ophthalmic emulsion 1 drop 2 (two) times daily. 0900  2100      dextran 70-hypromellose (ARTIFICIAL TEARS,[id number],) ophthalmic solution Place 1 drop into both eyes 4 (four) times daily as needed.      ferrous sulfate 300 mg (60 mg iron)/5 mL syrup Take 300 mg by mouth As instructed. Every other day      lactose-reduced food (ISOSOURCE ORAL) 1.5 mg by Per G Tube route continuous. 1.5 per peg tube at 50ml/hr continuously      linaCLOtide (LINZESS) 145 mcg Cap capsule Take 145 mcg by mouth before breakfast.      midodrine (PROAMATINE) 10 MG tablet 1  tablet (10 mg total) by Per G Tube route 3 (three) times daily.  Qty: 90 tablet, Refills: 11      ondansetron (ZOFRAN) 4 MG tablet 8 mg by Per G Tube route 2 (two) times daily.      polyvinyl alcohol-povidone (CLEAR EYES NATURAL TEARS) 0.5-0.6 % Drop Apply 1 drop to eye daily as needed.      acetaminophen (TYLENOL) 325 MG tablet 2 tablets (650 mg total) by Per G Tube route every 4 (four) hours as needed for Pain or Temperature greater than (100.4F).  Refills: 0      albuterol-ipratropium (DUO-NEB) 2.5 mg-0.5 mg/3 mL nebulizer solution Take 3 mLs by nebulization every 6 (six) hours. Rescue  Qty: 1 Box, Refills: 0      ergocalciferol (ERGOCALCIFEROL) 50,000 unit Cap Take 50,000 Units by mouth every Saturday.      polyethylene glycol (GLYCOLAX) 17 gram PwPk Take 17 g by mouth daily as needed.             Orders Placed This Encounter   Procedures    BLANKET FULL BODY 85.8X50IN    Culture, Respiratory with Gram Stain    Blood culture #1 **CANNOT BE ORDERED STAT**    Blood culture #2 **CANNOT BE ORDERED STAT**    Urine culture    X-Ray Chest 1 View    CT Abdomen Pelvis With Contrast    CBC auto differential    Lactic acid, plasma    Comprehensive metabolic panel    Lipase    Urinalysis    Troponin I High Sensitivity    Urinalysis Microscopic    Urinalysis, Reflex to Urine Culture Urine, Clean Catch    Urinalysis, Reflex to Urine Culture    TSH    TSH    CBC Without Differential    Comprehensive Metabolic Panel    Procalcitonin    Lactic acid, plasma    Diet NPO    Cardiac Monitoring - Adult    Straight Cath (In and Out) - do not wait for patient to void    Vital signs    Vital Signs    Bladder scan    Notify Physician    Place sequential compression device    DNR (Do Not Resuscitate)    Inpatient consult to Registered Dietitian/Nutritionist    Inpatient consult to Infectious Diseases    Contact isolation status    Pulse Oximetry Continuous    Mechanical ventilation Continuous    EKG 12-lead    Insert saline lock     Possible Hospitalization    Admit to Inpatient       Imaging Results              CT Abdomen Pelvis With Contrast (Final result)  Result time 07/06/23 15:16:09      Final result by Richar Paredes MD (07/06/23 15:16:09)                   Narrative:    CMS MANDATED QUALITY DATA - CT RADIATION - 436    All CT scans at this facility utilize dose modulation, iterative reconstruction, and/or weight based dosing when appropriate to reduce radiation dose to as low as reasonably achievable.        Reason: Bowel obstruction suspected; Abd distention, recent hypotension Bowel obstruction suspected; Abd distention, recent hypotension    TECHNIQUE: CT abdomen and pelvis with 100 mL Omnipaque 350.    COMPARISON: 6/29/2022    CT ABDOMEN:  Dependent lower lobe reticular alveolar opacities show slight improved aeration right lower lung zone and unchanged left lower lung zone, suggesting consolidation or atelectasis.    Gastrostomy tube tip remains in distal gastric body.    Hyperdensity remains in otherwise unremarkable gallbladder. Liver is normal. No intrahepatic or extrahepatic bile duct dilation.    Pancreas, spleen adrenals, and left kidney are normal right renal hypodensity is too small to characterize but likely.    Minor aortoiliac calcifications are present. Intestines are unremarkable. A normal. No free intraperitoneal gas.    Small fat-containing periumbilical hernia is present.    No acute osseous abnormality.    CT PELVIS:  Hamilton catheter terminates in decompressed bladder. Uterus and adnexa are normal. No free pelvic fluid.    Prominent heterotopic ossification occurs about the left hip. Degenerative narrowing of bilateral hip joints is evident. Muscle atrophy of bilateral gluteal and proximal thigh regions unchanged. Reticular soft tissue density affects medial gluteal soft tissues bilaterally extending deep to the sacrococcygeal junction without associated osseous destruction.    IMPRESSION:    1. Improving right  lower lung zone atelectasis and/or consolidation.  2. Unchanged hyperdensity gallbladder suggesting cholelithiasis.  3. Unchanged small fat-containing periumbilical hernia.    Electronically signed by:  Richar Paredes MD  7/6/2023 3:16 PM CDT Workstation: [id number]                                     X-Ray Chest 1 View (Final result)  Result time 07/06/23 10:17:38      Final result by Robert Farrell MD (07/06/23 10:17:38)                   Narrative:    CLINICAL HISTORY:  62 years (1960) Female Hypotension, r/o pna Hypotension    TECHNIQUE:  Portable AP radiograph the chest.    COMPARISON:  Radiograph from October 20, 2022.    FINDINGS:  Mild scattered interstitial opacities are seen in the midlung zones bilaterally, similar to the previous exam. Costophrenic angles are seen without effusion. No pneumothorax is identified. The heart is top normal in size. There is a tracheostomy tube with tip just above the clavicular heads, 6 in meters from the laith. Osseous structures appear within normal limits. The visualized upper abdomen is unremarkable.    IMPRESSION:  1. Mild scattered nonspecific reticular interstitial lung markings bilaterally, similar to the previous exam suggesting examination of atelectasis, scarring, or possibly mild atypical infection/viral pneumonia, or trace edema in the appropriate setting.  2. Tracheostomy tube with tip projecting just above the clavicular heads.                  .            Electronically signed by:  Robert Farrell MD  7/6/2023 10:17 AM CDT Workstation: [id number]                                (radiology reading, visualized by me)      ED Course as of 07/06/23 1708   Thu Jul 06, 2023   0925 EKG:  Sinus bradycardia, rate of 58, occasional PVCs, incomplete RBBB.  There are no acute ST or T wave changes suggestive of acute ischemia or infarction.  (Independently interpreted by me) [MR]   0925 Patient noted to have increased peak airway pressures to the 40s to 50s  with some stridor on initial evaluation improved mildly on suctioning but without obstruction noted.  Cuff is inflated.  On repositioning of the patient's airway from a rightward gaze to a neutral position, peak pressures normalize and stridor resolved.  This appears to be a local compression issue secondary to patient's voluntary neck movement. [MR]   1008 CXR:  Chronic-appearing lung changes compared to multiple prior with NAD.  Trach in place. (Independently interpreted by me)   [MR]      ED Course User Index  [MR] Reji Norris MD       MDM:    62 y.o. female with hypotension and bradycardia and this nonverbal patient with prior anoxic brain injury, vent dependent.  Broad workup undertaken given limited contextual clues and multiple comorbidities.  EKG without sign of initial arrhythmia requiring intervention or ischemic change.  Cardiac biomarker sent for further risk stratification of ACS.  Low suspicion for PE or aortic dissection at this point.  Abdomen is tense and distended.  CT of the abdomen and pelvis plan to exclude emergent intra-abdominal process such as obstruction, abscess, perforated viscus.  Additional laboratories including lactic acid, blood cultures as well as chest x-ray and urinalysis done to assess for potential infectious focus as well.  Patient is currently normotensive.  Low suspicion for new acute intracranial process and I do not think brain imaging is indicated.    Patient with hypothermia on further assessment addressed with passive rewarming.  She has no recurrent hypotension requiring intervention other than fluid bolus.  Lactic acid is normal.  I doubt severe sepsis.  Questionable UTI versus colonization with broad-spectrum antibiotics initiated in view of patient's prior resistance including ESBL.  I have discussed with hospital medicine who will assume care.  No sign of other acute intra-abdominal process on imaging.  Thyroid function ultimately not indicative of  hypothyroid state.    Diagnoses:    1. Hypotension  2. Bradycardia  3. Hypothermia  4. UTI       Reji Norris MD  07/06/23 7670

## 2023-07-06 NOTE — CONSULTS
Consult Note  Infectious Disease    Reason for Consult:  Antibiotic recommendations    HPI: Genna Felix is a 62 y.o. female  resident of [facility], with past medical history of anoxic brain injury status post tracheostomy/peg tube, prior PE, hypertension, GERD and history of multidrug resistant organisms an indwelling cholecystostomy tube since January 2020, most recently seen by our service in early August of 2022 for septic shock secondary to pneumonia and by [facility] main ID late August for right arm cellulitis.  She was seen in our emergency room June 12 for placement of a fallen out PEG tube.  She was sent to the emergency room this morning with bradycardia to the 40s, hypotension to the 80s, f hypothermic ound to have scattered rhonchi, markedly distended abdomen, normal white blood cell count, urinalysis with 95 white blood cells.  CT scan of the abdomen was performed showed improvement compared to ?  August 2022 CT of the right lower lung zone atelectasis and/or consolidation, cholelithiasis periumbilical hernia.  Chest x-ray shows slight increase in fibrotic background.  Review of NH MAR does not include any antibiotics    Review of patient's allergies indicates:  No Known Allergies  Past Medical History:   Diagnosis Date    Anoxic brain damage 07/2020    Bedbound 07/2020    Cardiac arrest 07/2020    Cirrhosis     GERD (gastroesophageal reflux disease)     Hemangioma of intra-abdominal structure     Hypertension     Nursing [place] resident     Protein calorie malnutrition     Pulmonary embolus     Respiratory distress     S/P percutaneous endoscopic gastrostomy (PEG) tube placement 07/2020    Total self-care deficit 07/2020    Tracheostomy dependence     7/2020     Past Surgical History:   Procedure Laterality Date    PEG W/TRACHEOSTOMY PLACEMENT  07/27/2020    SKIN GRAFT      buttocks     Social History     Socioeconomic History    Marital status:    Tobacco Use    Smoking status: Never     Smokeless tobacco: Never   Substance and Sexual Activity    Alcohol use: Not Currently    Drug use: Not Currently    Sexual activity: Not Currently     Family History   Problem Relation Age of Onset    No Known Problems Mother     No Known Problems Father          Review of Systems: unobtainable    EXAM & DIAGNOSTICS REVIEWED:   Vitals:     Temp:  [92.2 °F (33.4 °C)-92.5 °F (33.6 °C)]   Temp: (!) 92.5 °F (33.6 °C) (07/06/23 1437)  Pulse: 80 (07/06/23 1659)  Resp: (!) 30 (07/06/23 1613)  BP: 99/60 (07/06/23 1700)  SpO2: 95 % (07/06/23 1623)  No intake or output data in the 24 hours ending 07/06/23 1724  Temp up to 94.5  General:  In NAD. Eyes open, does not attend  Eyes:  Anicteric, PERRL, EOM cannot be tested. Sclera are injected  ENT:  Unable to examine due to high tone of jaw  Neck:  supple, tracheostomy to vent  Lungs: Clear, no consolidation, rales, wheezes, rub, secretions minimal. Grunting respirations when disturbed  Heart:  RRR, no gallop/murmur/rub noted  Abd:  Distended, fairly normal BS,no grimace with palpation.  no masses or organomegaly appreciated. PEG without redness, minimal drainage  :   Hamilton, urine with purulent sediment, no hematuria  Musc:  Joints without effusion, swelling, erythema, synovitis, with high tone and flexion contractures  Skin:  Tinea pedis left foot, scaly skin on plantar surfaces  Neuro:             Awake, does not attend, non verbal, occasionally grunting. Flexion contractures of hands and arms.  Psych:   Unable to assess  Lymphatic:     No cervical, supraclavicular nodes  Extrem: Chronic firm edema,without erythema, phlebitis, cellulitis, warm and well perfused  VAD:  peripherals     Isolation:  contact  Wound:   Right dorsal hand with erosion, ?from prior IV, non infected  Left side of sacrum, stage 3, non infected         General Labs reviewed:  Recent Labs   Lab 07/06/23  1041   WBC 5.94   HGB 8.3*   HCT 29.1*          Recent Labs   Lab 07/06/23  1041       K 3.5      CO2 26   BUN 27*   CREATININE 0.3*   CALCIUM 9.1   PROT 9.5*   BILITOT 0.7   ALKPHOS 165*   ALT 34   AST 29     No results for input(s): CRP in the last 168 hours.      Micro:  Microbiology Results (last 7 days)       Procedure Component Value Units Date/Time    Blood culture #1 **CANNOT BE ORDERED STAT** [[id number]] Collected: 07/06/23 0953    Order Status: Completed Specimen: Blood Updated: 07/06/23 1717     Blood Culture, Routine No Growth to date    Blood culture #2 **CANNOT BE ORDERED STAT** [[id number]] Collected: 07/06/23 0953    Order Status: Completed Specimen: Blood Updated: 07/06/23 1717     Blood Culture, Routine No Growth to date    Culture, Respiratory with Gram Stain [[id number]] Collected: 07/06/23 0913    Order Status: Completed Specimen: Respiratory from Sputum Updated: 07/06/23 1650     Gram Stain (Respiratory) Moderate WBC's     Gram Stain (Respiratory) Few Gram positive cocci     Gram Stain (Respiratory) Few Gram positive rods     Gram Stain (Respiratory) Few Gram negative rods    Urine culture [[id number]] Collected: 07/06/23 1058    Order Status: No result Specimen: Urine Updated: 07/06/23 1134            Imaging Reviewed:   CXR   CT  abd/pelvis     Cardiology:    IMPRESSION & PLAN   1.  Bradycardia, resolved  2.  Hypothermia (often how she presents with infection), improving on ishan hugger and warm IVF   Probably due to UTI, urine grossly purulent   Lactic acid normal x 2   Procalcitonin pending  3. History of MDRO in sputum without pneumonia on imaging  4. Ventilator dependent, vegetative state      Recommendations:  Meropenem for history of ESBL UTIs  No need for zerbaxa as she does not have a new pneumonia   Wound care  More IVF  Check cortisol though she has presented hypothermic on more than one occasion      Medical Decision Making during this encounter was  [_] Low Complexity  [_] Moderate Complexity  [  ] High Complexity

## 2023-07-06 NOTE — CARE UPDATE
07/06/23 0936   Patient Assessment/Suction   Level of Consciousness (AVPU) alert   Respiratory Effort Mild;Labored   Expansion/Accessory Muscles/Retractions accessory muscle use;expansion symmetric   All Lung Fields Breath Sounds wheezes, inspiratory;wheezes, expiratory   Rhythm/Pattern, Respiratory pattern regular;depth regular   Cough Frequency with stimulation   Cough Type assisted   Suction Method tracheal;sample obtained and sent to lab   Suction Pressure (mmHg) -120 mmHg   $ Suction Charges Inline Suction Procedure Stat Charge   Secretions Amount small   Secretions Color white;green   Secretions Characteristics thick   Sputum Collection sample obtained per suctioning   PRE-TX-O2   Device (Oxygen Therapy) ventilator   $ Is the patient on High Flow Oxygen? Yes   Oxygen Concentration (%) 40   SpO2 98 %   Pulse Oximetry Type Continuous   $ Pulse Oximetry - Multiple Charge Pulse Oximetry - Multiple   Pulse (!) 52   Resp (!) 28   Adult Surgical Airway Shiley Cuffed 6.0/ 75mm   No placement date or time found.   Present Prior to Hospital Arrival?: Yes  Type: Tracheostomy  Brand: Shiley  Airway Device Style: Cuffed  Airway Device Size: 6.0/ 75mm   Cuff Pressure   ( notified that pt was not acheiving vt and air leaking around cuff. Extra trach left at bedside.  stated he would assess need for trach change.)   Cuff Inflation? Inflated   Status Secured   Site Assessment Clean;Dry;No bleeding;No drainage   Ties Assessment Clean   Vent Select   $ Ventilator Initial 1   $ Ventilator Subsequent 1   Charged w/in last 24h YES   Preset Conventional Ventilator Settings   Vent Type    Ventilation Type VC   Vent Mode A/C   Set Rate 20 BPM   Vt Set 400 mL   PEEP/CPAP 8 cmH20   Pressure Support 0 cmH20   Waveform RAMP   Peak Flow 60 L/min   Plateau Set/Insp. Hold (sec) 0   Trigger Sensitivity Flow/I-Trigger 3 L/min   Patient Ventilator Parameters   Resp Rate Total 34 br/min   Peak Airway Pressure 41 cmH20   Mean  Airway Pressure 21 cmH20   Plateau Pressure 0 cmH20   Exhaled Vt 316 mL   Total Ve 9.18 L/m   I:E Ratio Measured 1:1.60   Conventional Ventilator Alarms   Resp Rate High Alarm 45 br/min   Press High Alarm 55 cmH2O   Apnea Rate 20   Apnea Volume (mL) 0 mL   Apnea Oxygen Concentration  100   Apnea Flow Rate (L/min) 60   T Apnea 20 sec(s)   Ready to Wean/Extubation Screen   FIO2<=50 (chart decimal) 0.4   MV<16L (chart vol.) 9.18   PEEP <=8 (chart #) 8   Ready to Wean Parameters   F/VT Ratio<105 (RSBI) (!) 88.61

## 2023-07-07 PROBLEM — N39.0 UTI (URINARY TRACT INFECTION): Status: RESOLVED | Noted: 2021-12-27 | Resolved: 2023-07-07

## 2023-07-07 PROBLEM — N39.0 PSEUDOMONAS URINARY TRACT INFECTION: Status: RESOLVED | Noted: 2022-03-25 | Resolved: 2023-07-07

## 2023-07-07 PROBLEM — R79.89 LOW SERUM CORTISOL LEVEL: Status: ACTIVE | Noted: 2023-07-07

## 2023-07-07 PROBLEM — E87.6 HYPOKALEMIA: Status: ACTIVE | Noted: 2023-07-07

## 2023-07-07 PROBLEM — N39.0 UTI (URINARY TRACT INFECTION): Status: ACTIVE | Noted: 2023-07-07

## 2023-07-07 PROBLEM — B96.5 PSEUDOMONAS URINARY TRACT INFECTION: Status: RESOLVED | Noted: 2022-03-25 | Resolved: 2023-07-07

## 2023-07-07 PROBLEM — L89.153 DECUBITUS ULCER OF SACRAL REGION, STAGE 3: Status: ACTIVE | Noted: 2023-07-07

## 2023-07-07 LAB
ACINETOBACTER CALCOACETICUS/BAUMANNII COMPLEX: NOT DETECTED
ALBUMIN SERPL BCP-MCNC: 2.9 G/DL (ref 3.5–5.2)
ALP SERPL-CCNC: 143 U/L (ref 55–135)
ALT SERPL W/O P-5'-P-CCNC: 30 U/L (ref 10–44)
ANION GAP SERPL CALC-SCNC: 3 MMOL/L (ref 8–16)
AST SERPL-CCNC: 23 U/L (ref 10–40)
BACTEROIDES FRAGILIS: NOT DETECTED
BILIRUB SERPL-MCNC: 0.5 MG/DL (ref 0.1–1)
BUN SERPL-MCNC: 24 MG/DL (ref 6–20)
CALCIUM SERPL-MCNC: 8.5 MG/DL (ref 8.7–10.5)
CANDIDA ALBICANS: NOT DETECTED
CANDIDA AURIS: NOT DETECTED
CANDIDA GLABRATA: NOT DETECTED
CANDIDA KRUSEI: NOT DETECTED
CANDIDA PARAPSILOSIS: NOT DETECTED
CANDIDA TROPICALIS: NOT DETECTED
CHLORIDE SERPL-SCNC: 109 MMOL/L (ref 95–110)
CO2 SERPL-SCNC: 26 MMOL/L (ref 23–29)
CORTIS SERPL-MCNC: 17.9 UG/DL
CORTIS SERPL-MCNC: 18.9 UG/DL
CORTIS SERPL-MCNC: 3.3 UG/DL
CORTIS SERPL-MCNC: 9.7 UG/DL
CREAT SERPL-MCNC: <0.3 MG/DL (ref 0.5–1.4)
CRYPTOCOCCUS NEOFORMANS/GATTII: NOT DETECTED
CTX-M GENE: ABNORMAL
ENTEROBACTER CLOACAE COMPLEX: NOT DETECTED
ENTEROBACTERALES: NOT DETECTED
ENTEROCOCCUS FAECALIS: NOT DETECTED
ENTEROCOCCUS FAECIUM: NOT DETECTED
ERYTHROCYTE [DISTWIDTH] IN BLOOD BY AUTOMATED COUNT: 19.9 % (ref 11.5–14.5)
ESCHERICHIA COLI: NOT DETECTED
EST. GFR  (NO RACE VARIABLE): >60 ML/MIN/1.73 M^2
GLUCOSE SERPL-MCNC: 81 MG/DL (ref 70–110)
HAEMOPHILUS INFLUENZAE: NOT DETECTED
HCT VFR BLD AUTO: 25.8 % (ref 37–48.5)
HGB BLD-MCNC: 7.5 G/DL (ref 12–16)
IMP GENE: ABNORMAL
KLEBSIELLA AEROGENES: NOT DETECTED
KLEBSIELLA OXYTOCA: NOT DETECTED
KLEBSIELLA PNEUMONIAE GROUP: NOT DETECTED
KPC: ABNORMAL
LISTERIA MONOCYTOGENES: NOT DETECTED
MCH RBC QN AUTO: 25.9 PG (ref 27–31)
MCHC RBC AUTO-ENTMCNC: 29.1 G/DL (ref 32–36)
MCR-1: ABNORMAL
MCV RBC AUTO: 89 FL (ref 82–98)
MEC A/C AND MREJ (MRSA): ABNORMAL
MEC A/C: ABNORMAL
NDM GENE: ABNORMAL
NEISSERIA MENINGITIDIS: NOT DETECTED
OXA-48-LIKE: ABNORMAL
PLATELET # BLD AUTO: 255 K/UL (ref 150–450)
PMV BLD AUTO: 11.7 FL (ref 9.2–12.9)
POTASSIUM SERPL-SCNC: 3.2 MMOL/L (ref 3.5–5.1)
PROT SERPL-MCNC: 8.5 G/DL (ref 6–8.4)
PROTEUS SPECIES: NOT DETECTED
PSEUDOMONAS AERUGINOSA: NOT DETECTED
RBC # BLD AUTO: 2.9 M/UL (ref 4–5.4)
SALMONELLA SP: NOT DETECTED
SERRATIA MARCESCENS: NOT DETECTED
SODIUM SERPL-SCNC: 138 MMOL/L (ref 136–145)
STAPHYLOCOCCUS AUREUS: NOT DETECTED
STAPHYLOCOCCUS EPIDERMIDIS: NOT DETECTED
STAPHYLOCOCCUS LUGDUNESIS: NOT DETECTED
STAPHYLOCOCCUS SPECIES: DETECTED
STENOTROPHOMONAS MALTOPHILIA: NOT DETECTED
STREPTOCOCCUS AGALACTIAE: NOT DETECTED
STREPTOCOCCUS PNEUMONIAE: NOT DETECTED
STREPTOCOCCUS PYOGENES: NOT DETECTED
STREPTOCOCCUS SPECIES: NOT DETECTED
VAN A/B: ABNORMAL
VIM GENE: ABNORMAL
WBC # BLD AUTO: 5.69 K/UL (ref 3.9–12.7)

## 2023-07-07 PROCEDURE — 99223 PR INITIAL HOSPITAL CARE,LEVL III: ICD-10-PCS | Mod: ,,, | Performed by: INTERNAL MEDICINE

## 2023-07-07 PROCEDURE — 25000003 PHARM REV CODE 250: Performed by: INTERNAL MEDICINE

## 2023-07-07 PROCEDURE — 25000003 PHARM REV CODE 250: Performed by: HOSPITALIST

## 2023-07-07 PROCEDURE — 85027 COMPLETE CBC AUTOMATED: CPT | Performed by: INTERNAL MEDICINE

## 2023-07-07 PROCEDURE — 36415 COLL VENOUS BLD VENIPUNCTURE: CPT | Performed by: INTERNAL MEDICINE

## 2023-07-07 PROCEDURE — 99900026 HC AIRWAY MAINTENANCE (STAT)

## 2023-07-07 PROCEDURE — 20000000 HC ICU ROOM

## 2023-07-07 PROCEDURE — 27000221 HC OXYGEN, UP TO 24 HOURS

## 2023-07-07 PROCEDURE — 25000242 PHARM REV CODE 250 ALT 637 W/ HCPCS: Performed by: INTERNAL MEDICINE

## 2023-07-07 PROCEDURE — 80053 COMPREHEN METABOLIC PANEL: CPT | Performed by: INTERNAL MEDICINE

## 2023-07-07 PROCEDURE — 94761 N-INVAS EAR/PLS OXIMETRY MLT: CPT

## 2023-07-07 PROCEDURE — 94640 AIRWAY INHALATION TREATMENT: CPT

## 2023-07-07 PROCEDURE — 99900031 HC PATIENT EDUCATION (STAT)

## 2023-07-07 PROCEDURE — 63600175 PHARM REV CODE 636 W HCPCS: Performed by: HOSPITALIST

## 2023-07-07 PROCEDURE — 99233 SBSQ HOSP IP/OBS HIGH 50: CPT | Mod: ,,, | Performed by: INTERNAL MEDICINE

## 2023-07-07 PROCEDURE — 94799 UNLISTED PULMONARY SVC/PX: CPT

## 2023-07-07 PROCEDURE — 99223 1ST HOSP IP/OBS HIGH 75: CPT | Mod: ,,, | Performed by: INTERNAL MEDICINE

## 2023-07-07 PROCEDURE — 82533 TOTAL CORTISOL: CPT | Performed by: INTERNAL MEDICINE

## 2023-07-07 PROCEDURE — 99233 PR SUBSEQUENT HOSPITAL CARE,LEVL III: ICD-10-PCS | Mod: ,,, | Performed by: INTERNAL MEDICINE

## 2023-07-07 PROCEDURE — 63600175 PHARM REV CODE 636 W HCPCS: Performed by: INTERNAL MEDICINE

## 2023-07-07 PROCEDURE — 87040 BLOOD CULTURE FOR BACTERIA: CPT | Performed by: INTERNAL MEDICINE

## 2023-07-07 PROCEDURE — 99900035 HC TECH TIME PER 15 MIN (STAT)

## 2023-07-07 PROCEDURE — 82533 TOTAL CORTISOL: CPT | Mod: 91 | Performed by: INTERNAL MEDICINE

## 2023-07-07 RX ORDER — DOXYLAMINE SUCCINATE 25 MG
TABLET ORAL 2 TIMES DAILY
Status: DISCONTINUED | OUTPATIENT
Start: 2023-07-07 | End: 2023-07-10 | Stop reason: HOSPADM

## 2023-07-07 RX ORDER — COSYNTROPIN 0.25 MG/ML
0.25 INJECTION, POWDER, FOR SOLUTION INTRAMUSCULAR; INTRAVENOUS ONCE
Status: COMPLETED | OUTPATIENT
Start: 2023-07-07 | End: 2023-07-07

## 2023-07-07 RX ORDER — LANOLIN ALCOHOL/MO/W.PET/CERES
800 CREAM (GRAM) TOPICAL
Status: DISCONTINUED | OUTPATIENT
Start: 2023-07-07 | End: 2023-07-10 | Stop reason: HOSPADM

## 2023-07-07 RX ADMIN — POTASSIUM BICARBONATE 35 MEQ: 391 TABLET, EFFERVESCENT ORAL at 05:07

## 2023-07-07 RX ADMIN — VANCOMYCIN HYDROCHLORIDE 2000 MG: 500 INJECTION, POWDER, LYOPHILIZED, FOR SOLUTION INTRAVENOUS at 10:07

## 2023-07-07 RX ADMIN — POTASSIUM BICARBONATE 35 MEQ: 391 TABLET, EFFERVESCENT ORAL at 08:07

## 2023-07-07 RX ADMIN — MEROPENEM 1 G: 1 INJECTION, POWDER, FOR SOLUTION INTRAVENOUS at 01:07

## 2023-07-07 RX ADMIN — MUPIROCIN: 20 OINTMENT TOPICAL at 08:07

## 2023-07-07 RX ADMIN — LEVALBUTEROL HYDROCHLORIDE 1.25 MG: 1.25 SOLUTION RESPIRATORY (INHALATION) at 04:07

## 2023-07-07 RX ADMIN — ENOXAPARIN SODIUM 40 MG: 100 INJECTION SUBCUTANEOUS at 05:07

## 2023-07-07 RX ADMIN — LEVALBUTEROL HYDROCHLORIDE 1.25 MG: 1.25 SOLUTION RESPIRATORY (INHALATION) at 12:07

## 2023-07-07 RX ADMIN — LEVALBUTEROL HYDROCHLORIDE 1.25 MG: 1.25 SOLUTION RESPIRATORY (INHALATION) at 07:07

## 2023-07-07 RX ADMIN — MIDODRINE HYDROCHLORIDE 10 MG: 2.5 TABLET ORAL at 08:07

## 2023-07-07 RX ADMIN — MICONAZOLE NITRATE: 20 CREAM TOPICAL at 08:07

## 2023-07-07 RX ADMIN — MEROPENEM 1 G: 1 INJECTION, POWDER, FOR SOLUTION INTRAVENOUS at 08:07

## 2023-07-07 RX ADMIN — IPRATROPIUM BROMIDE 0.5 MG: 0.5 SOLUTION RESPIRATORY (INHALATION) at 12:07

## 2023-07-07 RX ADMIN — IPRATROPIUM BROMIDE 0.5 MG: 0.5 SOLUTION RESPIRATORY (INHALATION) at 04:07

## 2023-07-07 RX ADMIN — COSYNTROPIN 0.25 MG: 0.25 INJECTION, POWDER, LYOPHILIZED, FOR SOLUTION INTRAVENOUS at 12:07

## 2023-07-07 RX ADMIN — MIDODRINE HYDROCHLORIDE 10 MG: 2.5 TABLET ORAL at 03:07

## 2023-07-07 RX ADMIN — IPRATROPIUM BROMIDE 0.5 MG: 0.5 SOLUTION RESPIRATORY (INHALATION) at 08:07

## 2023-07-07 RX ADMIN — MICONAZOLE NITRATE: 20 CREAM TOPICAL at 12:07

## 2023-07-07 RX ADMIN — MEROPENEM 1 G: 1 INJECTION, POWDER, FOR SOLUTION INTRAVENOUS at 05:07

## 2023-07-07 RX ADMIN — SODIUM CHLORIDE: 0.9 INJECTION, SOLUTION INTRAVENOUS at 08:07

## 2023-07-07 RX ADMIN — MUPIROCIN 1 G: 20 OINTMENT TOPICAL at 08:07

## 2023-07-07 NOTE — H&P
[facility] Medicine  History & Physical    Patient Name: Genna Felix  MRN: [id number]  Patient Class: IP- Inpatient  Admission Date: 7/6/2023  Attending Physician: Figueroa Flores MD   Primary Care Provider: Primary Doctor No         Patient information was obtained from past medical records and ER records.     Subjective:     Principal Problem:Hypothermia    Chief Complaint:   Chief Complaint   Patient presents with    Hypotension     Pt sent from [facility] for eval of hypotension. [facility] reports stable vitals enroute        HPI: 62 year old nursing home pt again getting admitted with hypotension/bradycardia and hypothermia  Pt has h/o anoxic brain injury status post tracheostomy/peg tube  Also  history of multidrug resistant organisms / and has an  indwelling cholecystostomy tube   Per NH records she is not on any abx  Because of unstable vital signs she was send to ER and got admitted        Past Medical History:   Diagnosis Date    Anoxic brain damage 07/2020    Bedbound 07/2020    Cardiac arrest 07/2020    Cirrhosis     GERD (gastroesophageal reflux disease)     Hemangioma of intra-abdominal structure     Hypertension     Nursing home resident     Protein calorie malnutrition     Pulmonary embolus     Respiratory distress     S/P percutaneous endoscopic gastrostomy (PEG) tube placement 07/2020    Total self-care deficit 07/2020    Tracheostomy dependence     7/2020       Past Surgical History:   Procedure Laterality Date    PEG W/TRACHEOSTOMY PLACEMENT  07/27/2020    SKIN GRAFT      buttocks       Review of patient's allergies indicates:  No Known Allergies    No current facility-administered medications on file prior to encounter.     Current Outpatient Medications on File Prior to Encounter   Medication Sig    acetylcysteine 100 mg/ml, 10%, (MUCOMYST) 100 mg/mL (10 %) nebulizer solution Take 4 mLs by nebulization every 8 (eight) hours.    arginine/glutamine/calcium bmb (XIOMARA  ORAL) 1 Package by PEG Tube route 2 (two) times a day. In 8oz of water    cefTRIAXone (ROCEPHIN) 1 gram injection Inject 1 g into the muscle daily as needed.    cycloSPORINE (RESTASIS) 0.05 % ophthalmic emulsion 1 drop 2 (two) times daily. 0900  2100    dextran 70-hypromellose (ARTIFICIAL TEARS,[id number],) ophthalmic solution Place 1 drop into both eyes 4 (four) times daily as needed.    ferrous sulfate 300 mg (60 mg iron)/5 mL syrup Take 300 mg by mouth As instructed. Every other day    lactose-reduced food (ISOSOURCE ORAL) 1.5 mg by Per G Tube route continuous. 1.5 per peg tube at 50ml/hr continuously    linaCLOtide (LINZESS) 145 mcg Cap capsule Take 145 mcg by mouth before breakfast.    midodrine (PROAMATINE) 10 MG tablet 1 tablet (10 mg total) by Per G Tube route 3 (three) times daily. (Patient taking differently: 10 mg by Per G Tube route 3 (three) times daily. 0600  1400  2200)    ondansetron (ZOFRAN) 4 MG tablet 8 mg by Per G Tube route 2 (two) times daily.    polyvinyl alcohol-povidone (CLEAR EYES NATURAL TEARS) 0.5-0.6 % Drop Apply 1 drop to eye daily as needed.    acetaminophen (TYLENOL) 325 MG tablet 2 tablets (650 mg total) by Per G Tube route every 4 (four) hours as needed for Pain or Temperature greater than (100.4F).    albuterol-ipratropium (DUO-NEB) 2.5 mg-0.5 mg/3 mL nebulizer solution Take 3 mLs by nebulization every 6 (six) hours. Rescue    ergocalciferol (ERGOCALCIFEROL) 50,000 unit Cap Take 50,000 Units by mouth every Saturday.    polyethylene glycol (GLYCOLAX) 17 gram PwPk Take 17 g by mouth daily as needed.    [DISCONTINUED] 0.9 % sodium chloride (SODIUM CHLORIDE 0.9 %) PgBk Inject into the vein.    [DISCONTINUED] artificial tears (ISOPTO TEARS) 0.5 % ophthalmic solution Place 1 drop into both eyes as needed.    [DISCONTINUED] cefUROXime (CEFTIN) 500 MG tablet Take 1 tablet (500 mg total) by mouth 2 (two) times daily.    [DISCONTINUED] chlorhexidine (PERIDEX) 0.12 %  solution Use as directed 15 mLs in the mouth or throat 2 (two) times daily.    [DISCONTINUED] clonazePAM (KLONOPIN) 0.5 MG tablet Take 0.5 mg by mouth 2 (two) times daily.    [DISCONTINUED] LIDOcaine HCL 10 mg/ml, 1%, 10 mg/mL (1 %) injection SMARTSIG:Rectally    [DISCONTINUED] oxybutynin (DITROPAN) 5 MG Tab Take 5 mg by mouth 2 (two) times daily.    [DISCONTINUED] SANTYL ointment Apply topically.     Family History       Problem Relation (Age of Onset)    Hypertension Mother    No Known Problems Father          Tobacco Use    Smoking status: Never    Smokeless tobacco: Never   Substance and Sexual Activity    Alcohol use: Not Currently    Drug use: Not Currently    Sexual activity: Not Currently     Review of Systems   Unable to perform ROS: Other (trach dependent)   Objective:     Vital Signs (Most Recent):  Temp: (!) 94.4 °F (34.7 °C) (07/06/23 2026)  Pulse: 61 (07/06/23 2145)  Resp: 15 (07/06/23 2145)  BP: 92/60 (07/06/23 2130)  SpO2: (!) 94 % (07/06/23 2145) Vital Signs (24h Range):  Temp:  [92.2 °F (33.4 °C)-94.5 °F (34.7 °C)] 94.4 °F (34.7 °C)  Pulse:  [43-80] 61  Resp:  [6-35] 15  SpO2:  [93 %-100 %] 94 %  BP: ()/(52-75) 92/60     Weight: 88.6 kg (195 lb 5.2 oz)  Body mass index is 38.15 kg/m².     Physical Exam  Vitals and nursing note reviewed.   Constitutional:       General: She is not in acute distress.     Appearance: She is ill-appearing.   HENT:      Head: Atraumatic.      Right Ear: External ear normal.      Left Ear: External ear normal.      Nose: Nose normal.      Mouth/Throat:      Mouth: Mucous membranes are dry.   Neck:      Comments: Trach   Cardiovascular:      Rate and Rhythm: Bradycardia present.   Pulmonary:      Effort: Pulmonary effort is normal.   Abdominal:      Comments: PEG  Cholecystotomy tube     Genitourinary:     Comments: Hamilton catheter  Skin:     General: Skin is warm.   Neurological:      Mental Status: Mental status is at baseline.              Significant  Labs: All pertinent labs within the past 24 hours have been reviewed.  CBC:   Recent Labs   Lab 07/06/23  1041   WBC 5.94   HGB 8.3*   HCT 29.1*        CMP:   Recent Labs   Lab 07/06/23  1041      K 3.5      CO2 26   GLU 93   BUN 27*   CREATININE 0.3*   CALCIUM 9.1   PROT 9.5*   ALBUMIN 3.3*   BILITOT 0.7   ALKPHOS 165*   AST 29   ALT 34   ANIONGAP 9       Significant Imaging: I have reviewed all pertinent imaging results/findings within the past 24 hours.    Assessment/Plan:     * Hypothermia  Mostly from sepsis from genitourinary origin  H/o MDRO pseudomonas pneumonia in the past  No evident Pneumonia this time  Maintain  iv meropenem   Consult ID MD  Overall prognosis is very poor and should be under comfort care     Multiple drug resistant organism (MDRO) culture positive  Aware   In the past      Ventilator dependence  Chronic issue       PEG (percutaneous endoscopic gastrostomy) status  Aware  Nutrition consulted for TFs      Persistent vegetative state  Aware   From anoxic brain damage        VTE Risk Mitigation (From admission, onward)         Ordered     enoxaparin injection 40 mg  Daily         07/06/23 1645     IP VTE HIGH RISK PATIENT  Once         07/06/23 1554     Place sequential compression device  Until discontinued         07/06/23 1554                           Figueroa Flores MD  Department of Hospital Medicine  Formerly [facility]

## 2023-07-07 NOTE — PLAN OF CARE
Unable to discuss plan of care with patient due to level of consciousness     Problem: Adult Inpatient Plan of Care  Goal: Absence of Hospital-Acquired Illness or Injury  Outcome: Ongoing, Progressing-Safety maintained     Problem: Inability to Wean (Mechanical Ventilation, Invasive)  Goal: Mechanical Ventilation Liberation  Outcome: Ongoing, Progressing-chronic ventilator-dependent

## 2023-07-07 NOTE — PROCEDURES
Procedure Location:room [facility]  Education/need:midline  Prep:chloraprep  Supplies:   Brand:Bard   Gauge/ Length:18ga/10cm   Lot #:[id number]  Extremity:left upper  Vessel:cephalic  Attempts:1  Inserted by:Dakota Miller RN  Date/time placed:07/07/2023/1106  Tolerated:well  Dressing applied: CHG occlussive drsg

## 2023-07-07 NOTE — CONSULTS
Pulmonary/Critical Care Consult      Patient name: Genna Felix  MRN: [id number]  Date: 07/07/2023    Admit Date: 7/6/2023  Consult Requested By: Lanre Goss MD    Reason for Consult: respiratory failure, chronic ventilator    HPI:    7/7/2023 - 63 yo chronic vent sent to ER for hypothermia and bradycardia, she is unresponsive and not able to provide any history.  Chart has been reviewed.  She has been seen by Dr Perez    Review of Systems    Review of Systems   Unable to perform ROS: Mental acuity     Past Medical History    Past Medical History:   Diagnosis Date    Anoxic brain damage 07/2020    Bedbound 07/2020    Cardiac arrest 07/2020    Cirrhosis     GERD (gastroesophageal reflux disease)     Hemangioma of intra-abdominal structure     Hypertension     Nursing home resident     Protein calorie malnutrition     Pulmonary embolus     Respiratory distress     S/P percutaneous endoscopic gastrostomy (PEG) tube placement 07/2020    Total self-care deficit 07/2020    Tracheostomy dependence     7/2020       Past Surgical History    Past Surgical History:   Procedure Laterality Date    PEG W/TRACHEOSTOMY PLACEMENT  07/27/2020    SKIN GRAFT      buttocks       Medications (scheduled):      enoxparin  40 mg Subcutaneous Daily    levalbuterol  1.25 mg Nebulization Q8H    And    ipratropium  0.5 mg Nebulization Q8H    meropenem (MERREM) IVPB  1 g Intravenous Q8H    miconazole   Topical (Top) BID    midodrine  10 mg Per G Tube TID    mupirocin   Nasal BID       Medications (infusions):      sodium chloride 0.9% 100 mL/hr at 07/07/23 1301       Medications (prn):     acetaminophen, magnesium oxide, magnesium oxide, potassium bicarbonate, potassium bicarbonate, potassium bicarbonate, potassium, sodium phosphates    Family History:   Family History   Problem Relation Age of Onset    Hypertension Mother     No Known Problems Father        Social History: Tobacco:   Social History     Tobacco Use   Smoking Status Never    Smokeless Tobacco Never                                EtOH:   Social History     Substance and Sexual Activity   Alcohol Use Not Currently                                Drugs:   Social History     Substance and Sexual Activity   Drug Use Not Currently     Physical Exam    Vital signs:  Temp:  [92.5 °F (33.6 °C)-97.3 °F (36.3 °C)]   Pulse:  [45-96]   Resp:  [3-36]   BP: ()/(47-73)   SpO2:  [87 %-100 %]     Intake/Output:   Intake/Output Summary (Last 24 hours) at 7/7/2023 1343  Last data filed at 7/7/2023 1301  Gross per 24 hour   Intake 2456 ml   Output 751 ml   Net 1705 ml        BMI: Estimated body mass index is 38.15 kg/m² as calculated from the following:    Height as of this encounter: 5' (1.524 m).    Weight as of this encounter: 88.6 kg (195 lb 5.2 oz).    Physical Exam  Vitals and nursing note reviewed.   Constitutional:       General: She is not in acute distress.     Appearance: She is not ill-appearing, toxic-appearing or diaphoretic.      Comments: Chronically ill  Vent dependent  Eyes open but does not respond to me  Has Beth Madrigal on   HENT:      Head: Normocephalic and atraumatic.      Right Ear: External ear normal.      Left Ear: External ear normal.      Nose: Nose normal. No congestion or rhinorrhea.      Mouth/Throat:      Mouth: Mucous membranes are moist.      Pharynx: Oropharynx is clear. No oropharyngeal exudate or posterior oropharyngeal erythema.   Eyes:      General: No scleral icterus.        Right eye: No discharge.         Left eye: No discharge.      Extraocular Movements: Extraocular movements intact.      Conjunctiva/sclera: Conjunctivae normal.      Pupils: Pupils are equal, round, and reactive to light.   Neck:      Vascular: No carotid bruit.      Comments: Trach in place (there is a report that it was downsized at NH)  Cardiovascular:      Rate and Rhythm: Normal rate and regular rhythm.      Pulses: Normal pulses.      Heart sounds: No murmur heard.    No friction  rub. No gallop.   Pulmonary:      Effort: Pulmonary effort is normal. No respiratory distress.      Breath sounds: No stridor. Rhonchi present. No wheezing or rales.      Comments: Ventlated   PEG  Chest:      Chest wall: No tenderness.   Abdominal:      General: There is distension.      Tenderness: There is no abdominal tenderness. There is no guarding.      Comments: PEG  BS hypoactive   Musculoskeletal:         General: No swelling. Normal range of motion.      Cervical back: Normal range of motion and neck supple. No rigidity or tenderness.      Right lower leg: Edema present.      Left lower leg: Edema present.      Comments: Chronic edema   Lymphadenopathy:      Cervical: No cervical adenopathy.   Skin:     General: Skin is warm and dry.      Coloration: Skin is not jaundiced.      Findings: No bruising.      Comments: + sacral wound and hand wound (POA)   Neurological:      Comments: At baseline   Psychiatric:      Comments: Not able to assess  Calm        Laboratory    Recent Labs   Lab 07/07/23 0321   WBC 5.69   RBC 2.90*   HGB 7.5*   HCT 25.8*      MCV 89   MCH 25.9*   MCHC 29.1*       Recent Labs   Lab 07/07/23 0321   CALCIUM 8.5*   PROT 8.5*      K 3.2*   CO2 26      BUN 24*   CREATININE <0.3*   ALKPHOS 143*   ALT 30   AST 23   BILITOT 0.5       No results for input(s): PT, INR, APTT in the last 24 hours.    No results for input(s): CPK, CPKMB, TROPONINI, MB in the last 24 hours.    Additional labs: reviewed    Microbiology:       Microbiology Results (last 7 days)       Procedure Component Value Units Date/Time    Blood culture #1 **CANNOT BE ORDERED STAT** [[id number]] Collected: 07/06/23 0953    Order Status: Completed Specimen: Blood Updated: 07/07/23 1032     Blood Culture, Routine No Growth to date      No Growth to date    Blood culture #2 **CANNOT BE ORDERED STAT** [[id number]] Collected: 07/06/23 0953    Order Status: Completed Specimen: Blood Updated: 07/07/23 1032     Blood  Culture, Routine No Growth to date      No Growth to date    Culture, Respiratory with Gram Stain [[id number]]  (Abnormal) Collected: 07/06/23 0913    Order Status: Completed Specimen: Respiratory from Sputum Updated: 07/07/23 0918     Respiratory Culture PRESUMPTIVE PSEUDOMONAS SPECIES  Moderate  Identification and susceptibility pending       Gram Stain (Respiratory) Moderate WBC's     Gram Stain (Respiratory) Few Gram positive cocci     Gram Stain (Respiratory) Few Gram positive rods     Gram Stain (Respiratory) Few Gram negative rods    Urine culture [[id number]] Collected: 07/06/23 1058    Order Status: Completed Specimen: Urine Updated: 07/07/23 0806     Urine Culture, Routine No growth to date    Narrative:      Collection Type->Urine, Clean Catch    MRSA Screen by PCR [[id number]] Collected: 07/06/23 2005    Order Status: Completed Specimen: Nasopharyngeal Swab from Nasal Updated: 07/06/23 2146     MRSA SCREEN BY PCR Negative            Radiology    CT Abdomen Pelvis With Contrast  CMS MANDATED QUALITY DATA - CT RADIATION - 436    All CT scans at this facility utilize dose modulation, iterative reconstruction, and/or weight based dosing when appropriate to reduce radiation dose to as low as reasonably achievable.    Reason: Bowel obstruction suspected; Abd distention, recent hypotension Bowel obstruction suspected; Abd distention, recent hypotension    TECHNIQUE: CT abdomen and pelvis with 100 mL Omnipaque 350.    COMPARISON: 6/29/2022    CT ABDOMEN:  Dependent lower lobe reticular alveolar opacities show slight improved aeration right lower lung zone and unchanged left lower lung zone, suggesting consolidation or atelectasis.    Gastrostomy tube tip remains in distal gastric body.    Hyperdensity remains in otherwise unremarkable gallbladder. Liver is normal. No intrahepatic or extrahepatic bile duct dilation.    Pancreas, spleen adrenals, and left kidney are normal right renal hypodensity is too small to  characterize but likely.    Minor aortoiliac calcifications are present. Intestines are unremarkable. A normal. No free intraperitoneal gas.    Small fat-containing periumbilical hernia is present.    No acute osseous abnormality.    CT PELVIS:  Hamilton catheter terminates in decompressed bladder. Uterus and adnexa are normal. No free pelvic fluid.    Prominent heterotopic ossification occurs about the left hip. Degenerative narrowing of bilateral hip joints is evident. Muscle atrophy of bilateral gluteal and proximal thigh regions unchanged. Reticular soft tissue density affects medial gluteal soft tissues bilaterally extending deep to the sacrococcygeal junction without associated osseous destruction.    IMPRESSION:    1. Improving right lower lung zone atelectasis and/or consolidation.  2. Unchanged hyperdensity gallbladder suggesting cholelithiasis.  3. Unchanged small fat-containing periumbilical hernia.    Electronically signed by:  Richar Paredes MD  7/6/2023 3:16 PM CDT Workstation: [id number]  X-Ray Chest 1 View  CLINICAL HISTORY:  62 years (1960) Female Hypotension, r/o pna Hypotension    TECHNIQUE:  Portable AP radiograph the chest.    COMPARISON:  Radiograph from October 20, 2022.    FINDINGS:  Mild scattered interstitial opacities are seen in the midlung zones bilaterally, similar to the previous exam. Costophrenic angles are seen without effusion. No pneumothorax is identified. The heart is top normal in size. There is a tracheostomy tube with tip just above the clavicular heads, 6 in meters from the laith. Osseous structures appear within normal limits. The visualized upper abdomen is unremarkable.    IMPRESSION:  1. Mild scattered nonspecific reticular interstitial lung markings bilaterally, similar to the previous exam suggesting examination of atelectasis, scarring, or possibly mild atypical infection/viral pneumonia, or trace edema in the appropriate setting.  2. Tracheostomy tube with tip  projecting just above the clavicular heads.    .    Electronically signed by:  Robert Farrell MD  7/6/2023 10:17 AM CDT Workstation: [id number]        Additional Studies    reviewed    Ventilator Information    Vent Mode: A/C  Oxygen Concentration (%):  [40-50] 50  Resp Rate Total:  [20 br/min-125 br/min] 30 br/min  Vt Set:  [400 mL] 400 mL  PEEP/CPAP:  [5 cmH20-8 cmH20] 5 cmH20  Pressure Support:  [0 cmH20] 0 cmH20  Mean Airway Pressure:  [12 qmF80-08 cmH20] 12 cmH20             No results for input(s): PH, PCO2, PO2, HCO3, POCSATURATED, BE in the last 72 hours.      Impression    Active Hospital Problems    Diagnosis  POA    *Hypothermia [T68.XXXA]  Unknown    Low serum cortisol level [R79.89]  Unknown    Multiple drug resistant organism (MDRO) culture positive [Z16.24]  Unknown    Ventilator dependence [Z99.11]  Not Applicable    PEG (percutaneous endoscopic gastrostomy) status [Z93.1]  Not Applicable    Persistent vegetative state [R40.3]  Yes      Resolved Hospital Problems   No resolved problems to display.       Plan    Continue ventilator at present settings, she is vent dependent and no attempts at weaning will be made  Antibiotics per Dr Chris Madrigal as needed  Watch H/H transfuse for hgb < 7  Cortisol is borderline - may need to check cortrosyn stimulation test  Follow up cultures  Has increased ventilating pressures Ppeak - 38, Pplateau - 18, likely due to smaller trach, respiratory reports some air loss yesterday but better now    Thank you for this consult.  I will follow with you while the patient is hospitalized.        Reji Andrews MD  [facility] Pulmonary/Critical Care  07/07/2023

## 2023-07-07 NOTE — PLAN OF CARE
Problem: Feeding Intolerance (Enteral Nutrition)  Goal: Feeding Tolerance  Outcome: Ongoing, Progressing  Intervention: Prevent and Manage Feeding Intolerance  Flowsheets (Taken 7/7/2023 6925)  Nutrition Support Management: tube feeding initiated

## 2023-07-07 NOTE — PLAN OF CARE
07/07/23 0751   Patient Assessment/Suction   Level of Consciousness (AVPU) responds to pain   Respiratory Effort Unlabored   Expansion/Accessory Muscles/Retractions no use of accessory muscles   All Lung Fields Breath Sounds coarse   Rhythm/Pattern, Respiratory assisted mechanically   Cough Frequency with stimulation   Cough Type assisted   Suction Method tracheal   Suction Pressure (mmHg) -120 mmHg   $ Suction Charges Inline Suction Procedure Stat Charge   Skin Integrity   $ Wound Care Tech Time 15 min   Area Observed Neck;Neck under tracheostomy   Skin Appearance without discoloration   PRE-TX-O2   Device (Oxygen Therapy) ventilator   $ Is the patient on Low Flow Oxygen? Yes   Oxygen Concentration (%) 40   SpO2 98 %   Pulse Oximetry Type Continuous   $ Pulse Oximetry - Multiple Charge Pulse Oximetry - Multiple   Pulse 78   Resp (!) 30   Aerosol Therapy   $ Aerosol Therapy Charges Aerosol Treatment   Daily Review of Necessity (SVN) completed   Respiratory Treatment Status (SVN) given   Treatment Route (SVN) in-line;ventilator   Patient Position (SVN) HOB elevated   Post Treatment Assessment (SVN) breath sounds improved   Signs of Intolerance (SVN) none   Breath Sounds Post-Respiratory Treatment   Throughout All Fields Post-Treatment All Fields   Throughout All Fields Post-Treatment aeration increased   Post-treatment Heart Rate (beats/min) 80   Post-treatment Resp Rate (breaths/min) 31   Adult Surgical Airway Shiley Cuffed 6.0/ 75mm   No placement date or time found.   Present Prior to Hospital Arrival?: Yes  Type: Tracheostomy  Brand: Shiley  Airway Device Style: Cuffed  Airway Device Size: 6.0/ 75mm   Cuff Inflation? Inflated   Status Secured   Site Assessment Clean;Dry;No bleeding   Site Care Cleansed;Dried;Dressing applied   Ties Assessment Clean;Dry;Intact   General Safety Checklist   Safety Promotion/Fall Prevention side rails raised   Airway Safety   Is Ambu Bag and Mask with Patient? Yes, Adult Ambu Bag  and Mask   Suction set is at the bedside? Yes   Extra trach at bedside? Yes   Extra trach sizes at bedside? 8   Vent Select   Conventional Vent Y   Charged w/in last 24h YES   Preset Conventional Ventilator Settings   Vent ID [id number]   Vent Type    Ventilation Type VC   Vent Mode A/C   Humidity HME   Set Rate 20 BPM   Vt Set 400 mL   PEEP/CPAP 8 cmH20   Peak Flow 60 L/min   Peak End Inspiratory Pressure 26 cmH20   Plateau Set/Insp. Hold (sec) 23   I-Trigger Type  V-TRIG   Trigger Sensitivity Flow/I-Trigger 3 L/min   Patient Ventilator Parameters   Resp Rate Total 31 br/min   Peak Airway Pressure 38 cmH20   Mean Airway Pressure 18 cmH20   Plateau Pressure 11 cmH20   Exhaled Vt 262 mL   Total Ve 7.08 L/m   I:E Ratio Measured 1:1.70   Auto PEEP 0 cmH20   Conventional Ventilator Alarms   Alarms On Y   Ve High Alarm 24 L/min   Ve Low Alarm 5 L/min   Vt High Alarm 1200 mL   Vt Low Alarm 200 mL   Resp Rate High Alarm 40 br/min   Press High Alarm 50 cmH2O   Apnea Rate 10   Apnea Volume (mL) 0 mL   Apnea Oxygen Concentration  100   Apnea Flow Rate (L/min) 63   T Apnea 20 sec(s)   IHI Ventilator Associated Pneumonia Bundle (Required)   Head of Bed Elevated  HOB 30   Oral Care Teeth brushed;Denture care;with normal saline;with mouthwash   Vent Circut Breaks Minimized Yes   Education   $ Education 15 min;Bronchodilator   Respiratory Evaluation   $ Care Plan Tech Time 15 min   $ Eval/Re-eval Charges Evaluation   Evaluation For New Orders

## 2023-07-07 NOTE — RESPIRATORY THERAPY
07/06/23 2026   Patient Assessment/Suction   Level of Consciousness (AVPU) alert   Respiratory Effort Unlabored   Expansion/Accessory Muscles/Retractions no use of accessory muscles   Rhythm/Pattern, Respiratory assisted mechanically   Cough Frequency with stimulation   Cough Type assisted   PRE-TX-O2   Device (Oxygen Therapy) ventilator   $ Is the patient on Low Flow Oxygen? Yes   Oxygen Concentration (%) 40   SpO2 97 %   Pulse Oximetry Type Continuous   $ Pulse Oximetry - Multiple Charge Pulse Oximetry - Multiple   Pulse 76   BP (!) 100/55   Airway Safety   Is Ambu Bag and Mask with Patient? Yes, Adult Ambu Bag and Mask   Suction set is at the bedside? Yes   Respiratory Interventions   Airway/Ventilation Management airway patency maintained   Vent Select   Conventional Vent Y   Charged w/in last 24h YES   Preset Conventional Ventilator Settings   Vent Type    Ventilation Type VC   Vent Mode A/C   Humidity HME   Set Rate 20 BPM   Vt Set 400 mL   PEEP/CPAP 8 cmH20   Peak Flow 60 L/min   Peak End Inspiratory Pressure 28 cmH20   I-Trigger Type  V-TRIG   Trigger Sensitivity Flow/I-Trigger 3 L/min   Patient Ventilator Parameters   Resp Rate Total 29 br/min   Peak Airway Pressure 31 cmH20   Mean Airway Pressure 16 cmH20   Plateau Pressure 0 cmH20   Exhaled Vt 427 mL   Total Ve 11.9 L/m   I:E Ratio Measured 1:2.00   Auto PEEP 0 cmH20   Conventional Ventilator Alarms   Alarms On Y   Resp Rate High Alarm 40 br/min   Press High Alarm 50 cmH2O   Apnea Rate 10   Apnea Volume (mL) 0 mL   Apnea Oxygen Concentration  100   Apnea Flow Rate (L/min) 63   T Apnea 20 sec(s)   Ready to Wean/Extubation Screen   FIO2<=50 (chart decimal) 0.4   MV<16L (chart vol.) 11.9   PEEP <=8 (chart #) 8   Vital Capacity   Vital Capacity (mL) 0   Education   $ Education Suction;Trach Care;Ventilator Oxygen;30 min   Respiratory Evaluation   $ Care Plan Tech Time 15 min   $ Eval/Re-eval Charges Evaluation

## 2023-07-07 NOTE — SUBJECTIVE & OBJECTIVE
Past Medical History:   Diagnosis Date    Anoxic brain damage 07/2020    Bedbound 07/2020    Cardiac arrest 07/2020    Cirrhosis     GERD (gastroesophageal reflux disease)     Hemangioma of intra-abdominal structure     Hypertension     Nursing home resident     Protein calorie malnutrition     Pulmonary embolus     Respiratory distress     S/P percutaneous endoscopic gastrostomy (PEG) tube placement 07/2020    Total self-care deficit 07/2020    Tracheostomy dependence     7/2020       Past Surgical History:   Procedure Laterality Date    PEG W/TRACHEOSTOMY PLACEMENT  07/27/2020    SKIN GRAFT      buttocks       Review of patient's allergies indicates:  No Known Allergies    No current facility-administered medications on file prior to encounter.     Current Outpatient Medications on File Prior to Encounter   Medication Sig    acetylcysteine 100 mg/ml, 10%, (MUCOMYST) 100 mg/mL (10 %) nebulizer solution Take 4 mLs by nebulization every 8 (eight) hours.    arginine/glutamine/calcium bmb (XIOMARA ORAL) 1 Package by PEG Tube route 2 (two) times a day. In 8oz of water    cefTRIAXone (ROCEPHIN) 1 gram injection Inject 1 g into the muscle daily as needed.    cycloSPORINE (RESTASIS) 0.05 % ophthalmic emulsion 1 drop 2 (two) times daily. 0900  2100    dextran 70-hypromellose (ARTIFICIAL TEARS,UMLD23-PSEQU,) ophthalmic solution Place 1 drop into both eyes 4 (four) times daily as needed.    ferrous sulfate 300 mg (60 mg iron)/5 mL syrup Take 300 mg by mouth As instructed. Every other day    lactose-reduced food (ISOSOURCE ORAL) 1.5 mg by Per G Tube route continuous. 1.5 per peg tube at 50ml/hr continuously    linaCLOtide (LINZESS) 145 mcg Cap capsule Take 145 mcg by mouth before breakfast.    midodrine (PROAMATINE) 10 MG tablet 1 tablet (10 mg total) by Per G Tube route 3 (three) times daily. (Patient taking differently: 10 mg by Per G Tube route 3 (three) times daily. 0600  1400  2200)    ondansetron (ZOFRAN) 4 MG tablet 8 mg  by Per G Tube route 2 (two) times daily.    polyvinyl alcohol-povidone (CLEAR EYES NATURAL TEARS) 0.5-0.6 % Drop Apply 1 drop to eye daily as needed.    acetaminophen (TYLENOL) 325 MG tablet 2 tablets (650 mg total) by Per G Tube route every 4 (four) hours as needed for Pain or Temperature greater than (100.4F).    albuterol-ipratropium (DUO-NEB) 2.5 mg-0.5 mg/3 mL nebulizer solution Take 3 mLs by nebulization every 6 (six) hours. Rescue    ergocalciferol (ERGOCALCIFEROL) 50,000 unit Cap Take 50,000 Units by mouth every Saturday.    polyethylene glycol (GLYCOLAX) 17 gram PwPk Take 17 g by mouth daily as needed.    [DISCONTINUED] 0.9 % sodium chloride (SODIUM CHLORIDE 0.9 %) PgBk Inject into the vein.    [DISCONTINUED] artificial tears (ISOPTO TEARS) 0.5 % ophthalmic solution Place 1 drop into both eyes as needed.    [DISCONTINUED] cefUROXime (CEFTIN) 500 MG tablet Take 1 tablet (500 mg total) by mouth 2 (two) times daily.    [DISCONTINUED] chlorhexidine (PERIDEX) 0.12 % solution Use as directed 15 mLs in the mouth or throat 2 (two) times daily.    [DISCONTINUED] clonazePAM (KLONOPIN) 0.5 MG tablet Take 0.5 mg by mouth 2 (two) times daily.    [DISCONTINUED] LIDOcaine HCL 10 mg/ml, 1%, 10 mg/mL (1 %) injection SMARTSIG:Rectally    [DISCONTINUED] oxybutynin (DITROPAN) 5 MG Tab Take 5 mg by mouth 2 (two) times daily.    [DISCONTINUED] SANTYL ointment Apply topically.     Family History       Problem Relation (Age of Onset)    Hypertension Mother    No Known Problems Father          Tobacco Use    Smoking status: Never    Smokeless tobacco: Never   Substance and Sexual Activity    Alcohol use: Not Currently    Drug use: Not Currently    Sexual activity: Not Currently     Review of Systems   Unable to perform ROS: Other (trach dependent)   Objective:     Vital Signs (Most Recent):  Temp: (!) 94.4 °F (34.7 °C) (07/06/23 2026)  Pulse: 61 (07/06/23 2145)  Resp: 15 (07/06/23 2145)  BP: 92/60 (07/06/23 2130)  SpO2: (!) 94 %  (07/06/23 0028) Vital Signs (24h Range):  Temp:  [92.2 °F (33.4 °C)-94.5 °F (34.7 °C)] 94.4 °F (34.7 °C)  Pulse:  [43-80] 61  Resp:  [6-35] 15  SpO2:  [93 %-100 %] 94 %  BP: ()/(52-75) 92/60     Weight: 88.6 kg (195 lb 5.2 oz)  Body mass index is 38.15 kg/m².     Physical Exam  Vitals and nursing note reviewed.   Constitutional:       General: She is not in acute distress.     Appearance: She is ill-appearing.   HENT:      Head: Atraumatic.      Right Ear: External ear normal.      Left Ear: External ear normal.      Nose: Nose normal.      Mouth/Throat:      Mouth: Mucous membranes are dry.   Neck:      Comments: Trach   Cardiovascular:      Rate and Rhythm: Bradycardia present.   Pulmonary:      Effort: Pulmonary effort is normal.   Abdominal:      Comments: PEG  Cholecystotomy tube     Genitourinary:     Comments: Hamilton catheter  Skin:     General: Skin is warm.   Neurological:      Mental Status: Mental status is at baseline.              Significant Labs: All pertinent labs within the past 24 hours have been reviewed.  CBC:   Recent Labs   Lab 07/06/23  1041   WBC 5.94   HGB 8.3*   HCT 29.1*        CMP:   Recent Labs   Lab 07/06/23  1041      K 3.5      CO2 26   GLU 93   BUN 27*   CREATININE 0.3*   CALCIUM 9.1   PROT 9.5*   ALBUMIN 3.3*   BILITOT 0.7   ALKPHOS 165*   AST 29   ALT 34   ANIONGAP 9       Significant Imaging: I have reviewed all pertinent imaging results/findings within the past 24 hours.

## 2023-07-07 NOTE — ASSESSMENT & PLAN NOTE
Mostly from sepsis from genitourinary origin  H/o MDRO pseudomonas pneumonia in the past  No evident Pneumonia this time  Maintain  iv meropenem   Consult ID MD  Overall prognosis is very poor and should be under comfort care

## 2023-07-07 NOTE — HPI
62 year old nursing home pt again getting admitted with hypotension/bradycardia and hypothermia  Pt has h/o anoxic brain injury status post tracheostomy/peg tube  Also  history of multidrug resistant organisms / and has an  indwelling cholecystostomy tube   Per NH records she is not on any abx  Because of unstable vital signs she was send to ER and got admitted

## 2023-07-07 NOTE — CARE UPDATE
07/06/23 1946   Patient Assessment/Suction   Level of Consciousness (AVPU) alert   Respiratory Effort Unlabored   Expansion/Accessory Muscles/Retractions expansion symmetric;no retractions;no use of accessory muscles   All Lung Fields Breath Sounds wheezes, expiratory;wheezes, inspiratory   Rhythm/Pattern, Respiratory assisted mechanically   Cough Frequency with stimulation   Cough Type assisted   Suction Method oral;tracheal   Suction Pressure (mmHg) -120 mmHg   $ Suction Charges Inline Suction Procedure Stat Charge   Secretions Amount small   Secretions Color white;green   Secretions Characteristics thick   Skin Integrity   $ Wound Care Tech Time 15 min   Area Observed Neck;Neck under tracheostomy   Skin Appearance without discoloration   PRE-TX-O2   Device (Oxygen Therapy) ventilator   $ Is the patient on Low Flow Oxygen? Yes   Oxygen Concentration (%) 40   Pulse Oximetry Type Continuous   $ Pulse Oximetry - Multiple Charge Pulse Oximetry - Multiple   Adult Surgical Airway Shiley Cuffed 6.0/ 75mm   No placement date or time found.   Present Prior to Hospital Arrival?: Yes  Type: Tracheostomy  Brand: Shiley  Airway Device Style: Cuffed  Airway Device Size: 6.0/ 75mm   Cuff Pressure   (mlt)   Cuff Inflation? Inflated   Speaking Valve Usage Not wearing   Status Secured   Site Assessment Clean;Dry;No bleeding;No drainage   Ties Assessment Clean;Dry;Intact;Secure   Airway Safety   Is Ambu Bag and Mask with Patient? Yes, Adult Ambu Bag and Mask   Suction set is at the bedside? Yes   Extra trach at bedside? Yes   Extra trach sizes at bedside? 6   Respiratory Interventions   Airway/Ventilation Management airway patency maintained   Vent Select   Conventional Vent Y   Charged w/in last 24h YES   Preset Conventional Ventilator Settings   Vent ID 8   Vent Type    Humidity HME   Conventional Ventilator Alarms   Alarms On Y   Ready to Wean/Extubation Screen   FIO2<=50 (chart decimal) 0.4   Education   $ Education  Suction;Ventilator Oxygen;15 min

## 2023-07-07 NOTE — PLAN OF CARE
[facility]  Initial Discharge Assessment       Primary Care Provider: Primary Doctor No    Admission Diagnosis: Hypothermia, initial encounter [T68.XXXA]    Admission Date: 7/6/2023  Expected Discharge Date:     Transition of Care Barriers: None     completed assessment via phone with Pt daughter. Demographics, PCP, and insurance verified. No home health. No dialysis. Pt lives in [facility] and will be returning to the facility at discharge. Pt will be transported via [facility] transportation. Pt has no other needs to be addressed at this time.     Payor: MEDICARE / Plan: MEDICARE PART A & B / Product Type: Government /     Extended Emergency Contact Information  Primary Emergency Contact: GwenAmelia  Mobile Phone: [phone]  Relation: Sister  Preferred language: English   needed? No  Secondary Emergency Contact: Starr Felix  Mobile Phone: [phone]  Relation: Daughter    Discharge Plan A: Return to nursing home  Discharge Plan B: Return to Nursing Home      [facility] - [address] [address]  Phone: [phone] Fax: [phone]      Initial Assessment (most recent)       Adult Discharge Assessment - 07/07/23 1626          Discharge Assessment    Assessment Type Discharge Planning Assessment     Confirmed/corrected address, phone number and insurance Yes     Confirmed Demographics Correct on Facesheet     Source of Information family     If unable to respond/provide information was family/caregiver contacted? Yes     Contact Name/Number Starr Felix [phone]     Communicated [place] with patient/caregiver Date not available/Unable to determine     Reason For Admission Hypothermia     People in Home facility resident     Facility Arrived From: [facility]     Do you expect to return to your current living situation? Yes     Do you have help at home or someone to help you manage  your care at home? Yes     Who are your caregiver(s) and their phone number(s)? [facility] care staff     Prior to hospitilization cognitive status: Unable to Assess     Current cognitive status: Unable to Assess     Equipment Currently Used at Home other (see comments)   Pt daughter said she has everything she needs at [facility], but did not specify what she has.    Readmission within 30 days? No     Patient currently being followed by outpatient case management? No     Do you currently have service(s) that help you manage your care at home? No     Do you take prescription medications? Yes     Do you have prescription coverage? Yes     Coverage MEDICARE - MEDICARE PART A & B     Do you have any problems affording any of your prescribed medications? No     Is the patient taking medications as prescribed? yes     Who is going to help you get home at discharge? Facility resident     How do you get to doctors appointments? family or friend will provide     Are you on dialysis? No     Do you take coumadin? No     Discharge Plan A Return to nursing home     Discharge Plan B Return to Nursing Home     DME Needed Upon Discharge  none     Discharge Plan discussed with: Adult children     Transition of Care Barriers None

## 2023-07-07 NOTE — PROGRESS NOTES
Consult Note  Infectious Disease    Reason for Consult:  Antibiotic recommendations    HPI: Genna Felix is a 62 y.o. female  resident of [facility], with past medical history of anoxic brain injury status post tracheostomy/peg tube, prior PE, hypertension, GERD and history of multidrug resistant organisms  , most recently seen by our service in early August of 2022 for septic shock secondary to pneumonia and by [facility] main ID late August for right arm cellulitis.  She was seen in our emergency room June 12 for placement of a fallen out PEG tube.  She was sent to the emergency room this morning with bradycardia to the 40s, hypotension to the 80s, f hypothermic ound to have scattered rhonchi, markedly distended abdomen, normal white blood cell count, urinalysis with 95 white blood cells.  CT scan of the abdomen was performed showed improvement compared to ?  August 2022 CT of the right lower lung zone atelectasis and/or consolidation, cholelithiasis periumbilical hernia.  Chest x-ray shows slight increase in fibrotic background.  Review of NH MAR does not include any antibiotics    7/7: Interim reviewed. Temps are improved, under Beth Hugger cortisol yesterday was 11.8 at 6 pm but a low 3.3 at 0400 today.  Her urine output is very poor despite another 1100 cc of fluid overnight.  The urine quality is also still purulent.  Blood pressure is a little low today, was hypertensive in the ER      EXAM & DIAGNOSTICS REVIEWED:   Vitals:     Temp:  [92.2 °F (33.4 °C)-96.9 °F (36.1 °C)]   Temp: 96.9 °F (36.1 °C) (07/07/23 0701)  Pulse: 78 (07/07/23 0751)  Resp: (!) 30 (07/07/23 0751)  BP: (!) 103/55 (07/07/23 0701)  SpO2: 98 % (07/07/23 0751)    Intake/Output Summary (Last 24 hours) at 7/7/2023 0920  Last data filed at 7/7/2023 0701  Gross per 24 hour   Intake 1157.67 ml   Output 341 ml   Net 816.67 ml     Temp up to 96.5  General:  In NAD. Eyes open, does not attend  Eyes:  Anicteric, PERRL, EOM cannot be tested.  Sclera are injected, left greater than right  ENT:  Unable to examine due to high tone of jaw  Neck:  supple, tracheostomy to vent  Lungs: Clear, no consolidation, rales, wheezes, rub, secretions minimal. Grunting respirations when disturbed  Heart:  RRR, no gallop/murmur/rub noted  Abd:  Distended, hypoactive BS,no grimace with palpation.  no masses or organomegaly appreciated. PEG without redness, minimal drainage  :   Hamilton, urine with purulent sediment, poor output, no hematuria  Musc:  Joints without effusion, swelling, erythema, synovitis, with high tone and flexion contractures  Skin:  Tinea pedis left foot, scaly skin on plantar surfaces  Neuro:           Arousable, does not attend, non verbal, occasionally grunting. Flexion contractures of hands and arms.  Psych:   Unable to assess  Lymphatic:     No cervical, supraclavicular nodes  Extrem: Chronic firm edema,without erythema, phlebitis, cellulitis, warm and well perfused  VAD:  peripherals     Isolation:  contact  Wound:   Right dorsal hand with erosion, ?from prior IV, non infected  Left side of sacrum, stage 3, non infected         General Labs reviewed:  Recent Labs   Lab 07/06/23  1041 07/07/23  0321   WBC 5.94 5.69   HGB 8.3* 7.5*   HCT 29.1* 25.8*    255       Recent Labs   Lab 07/06/23  1041 07/07/23  0321    138   K 3.5 3.2*    109   CO2 26 26   BUN 27* 24*   CREATININE 0.3* <0.3*   CALCIUM 9.1 8.5*   PROT 9.5* 8.5*   BILITOT 0.7 0.5   ALKPHOS 165* 143*   ALT 34 30   AST 29 23     No results for input(s): CRP in the last 168 hours.      Micro:  Microbiology Results (last 7 days)       Procedure Component Value Units Date/Time    Culture, Respiratory with Gram Stain [[id number]]  (Abnormal) Collected: 07/06/23 0913    Order Status: Completed Specimen: Respiratory from Sputum Updated: 07/07/23 0918     Respiratory Culture PRESUMPTIVE PSEUDOMONAS SPECIES  Moderate  Identification and susceptibility pending       Gram Stain  (Respiratory) Moderate WBC's     Gram Stain (Respiratory) Few Gram positive cocci     Gram Stain (Respiratory) Few Gram positive rods     Gram Stain (Respiratory) Few Gram negative rods    Urine culture [[id number]] Collected: 07/06/23 1058    Order Status: Completed Specimen: Urine Updated: 07/07/23 0806     Urine Culture, Routine No growth to date    Narrative:      Collection Type->Urine, Clean Catch    MRSA Screen by PCR [[id number]] Collected: 07/06/23 2005    Order Status: Completed Specimen: Nasopharyngeal Swab from Nasal Updated: 07/06/23 2146     MRSA SCREEN BY PCR Negative    Blood culture #1 **CANNOT BE ORDERED STAT** [[id number]] Collected: 07/06/23 0953    Order Status: Completed Specimen: Blood Updated: 07/06/23 1717     Blood Culture, Routine No Growth to date    Blood culture #2 **CANNOT BE ORDERED STAT** [[id number]] Collected: 07/06/23 0953    Order Status: Completed Specimen: Blood Updated: 07/06/23 1717     Blood Culture, Routine No Growth to date            Imaging Reviewed:   CXR   CT  abd/pelvis     Cardiology:    IMPRESSION & PLAN   1.  Bradycardia, resolved  2.  Hypothermia (often how she presents with infection), improving on ishan hugger and warm IVF   Probably due to UTI, urine grossly purulent, culture negative at less than 1 day.   Lactic acid normal x 2   Procalcitonin normal  3. History of MDRO in sputum without pneumonia on imaging   Pseudomonas on current culture  4. Ventilator dependent, vegetative state  5.  Severe anemia  6.       Recommendations:  Meropenem for history of ESBL UTIs ,   Wound care  More IVF  Rec formal cortrosyn stim test, ordered  Enhanced contact isolation    At least a midline before the weekend, ordered    D/w nursing and Dr. Goss    Medical Decision Making during this encounter was  [_] Low Complexity  [_] Moderate Complexity  [  ] High Complexity

## 2023-07-07 NOTE — PHYSICIAN QUERY
PT Name: Genna Felix  MR #: [id number]     DOCUMENTATION CLARIFICATION     CDS/: Kyra Logan Pe               Contact information: [phone]  This form is a permanent document in the medical record.     Query Date: July 7, 2023    By submitting this query, we are merely seeking further clarification of documentation.  Please utilize your independent clinical judgment when addressing the question(s) below.  The Medical Record contains the following   Indicators   Supporting Clinical Findings Location in Medical Record    Documentation  7/6 H&P - Pt has h/o anoxic brain injury status post tracheostomy / Ventilator dependence  7/6 H&P    BiPAP/Intubation/Mechanical Ventilation Mechanical Ventilator placed in use in ER 7/6 @ 9:36AM Per ER flowsheets       Provider, please specify the diagnosis or diagnoses associated with ventilator dependence.     [    ] Chronic Respiratory Failure with Hypoxia    [    ] Chronic Respiratory Failure with Hypercapnia    [    ] Chronic Respiratory Failure with Hypoxia and Hypercapnia   [  x  ] Other Respiratory Diagnosis (please specify): _Ventilator dependence________________     Present on admission (POA) status:  [ x ] Yes (Y)   [  ] No (N)     Please document in your progress notes daily for the duration of treatment until resolved and include in your discharge summary.

## 2023-07-07 NOTE — CONSULTS
[facility]  Adult Nutrition   Consult Note (Nutrition Support Management)    SUMMARY     Recommendations  1) RD recommends to initiate PEG-tube feeding with Vital HP at 15 mls/h advancing by 10 mls every 4 hours as tolerated to the goal rate of 45/mls/h providing 1080 kcals, 95 g protein and 903 mls water   2) Flush to with 30 mls water every 4 hours to clear tube.   3) RD to monitor rate/tolerance, weight, labs, etc. and make reocmmendtion prn.    Goals:   1) Nutrition provision to meet 100% of pts energy and protein needs.   2) Pt to tolerate PEG-tube feeding at the goal rate of 45 mls/h.    Nutrition Goal Status: goal not met, progressing towards goal    Communication of RD Recs: other (comment)    Dietitian Rounds Brief  Consult for PEG-tube feedings: Observed pt in ICU today resting comfortably. Her TF had not been started yet since dietary had not brought the Vital HP up yet. The nurse said there just happened to be a bottle in the TF cabinet and hoped it was OK. She finally does have the Vital HP and has started it at the goal of 15 mls/h which is what it is right now. Pt seems to be tolerating so far. Pts LBM was yesterday. Skin is intact and pertinent labs are WNL's except for slight hypoalbuminemia (2.9). Will continue to follow prn.    Pt is a 62 yof who is a NH resident  and admitted with hypothermia with a PMH of anoxic brain injury, s/p trach/peg, multi resistant organisms, indwelling cholecystostomy tube, cardiac arrest. GERD, HTN and PCM.    Diet order:   Current Diet Order: NPO      Evaluation of Received Nutrient/Fluid Intake  Energy Calories Required: not meeting needs  Protein Required: not meeting needs  Fluid Required: meeting needs  Tolerance: other (see comments)     % Intake of Estimated Energy Needs: 0%  % Meal Intake: NPO      Intake/Output Summary (Last 24 hours) at 7/7/2023 0932  Last data filed at 7/7/2023 0701  Gross per 24 hour   Intake 1157.67 ml   Output 341 ml   Net  816.67 ml        Anthropometrics  Temp: 96.9 °F (36.1 °C)  Height Method: Estimated  Height: 5' (152.4 cm)  Height (inches): 60 in  Weight Method: Bed Scale  Weight: 88.6 kg (195 lb 5.2 oz)  Weight (lb): 195.33 lb  Ideal Body Weight (IBW), Female: 100 lb  % Ideal Body Weight, Female (lb): 195.33 %  BMI (Calculated): 38.1  BMI Grade: 35 - 39.9 - obesity - grade II       Estimated/Assessed Needs  Weight Used For Calorie Calculations: 88.6 kg (195 lb 5.2 oz)  Energy Calorie Requirements (kcal): 974-1240 kcals/day (11-14 kcals/kg ABW)  Energy Need Method: Kcal/kg  Protein Requirements:  g/day (2-2.5 g/kg IBW)  Weight Used For Protein Calculations: 45 kg (99 lb 3.3 oz)     Estimated Fluid Requirement Method: RDA Method  RDA Method (mL): 974       Reason for Assessment  Reason For Assessment: consult, new TPN  Diagnosis: other (see comments) (Hypothermia)  Relevant Medical History: Respiratory distress, Cirrhosis, Hemangioma of intra-abdominal structure, Tracheostomy dependence, Anoxic brain damage, Pulmonary embolus, Hypertension, GERD (gastroesophageal reflux disease), Protein calorie malnutrition, Bedbound, Total self-care deficit, Nursing home resident, Cardiac arrest, Persistent vegetative state, PEG (percutaneous endoscopic gastrostomy) status, Ventilator dependence, Multiple drug resistant organism (MDRO) culture positive  Interdisciplinary Rounds:  (No rounds on Fridays)    Nutrition/Diet History  Food Allergies: NKFA  Factors Affecting Nutritional Intake: NPO    Nutrition Risk Screen  Nutrition Risk Screen: tube feeding or parenteral nutrition     MST Score: 0  Have you recently lost weight without trying?: No  Weight loss score: 0  Have you been eating poorly because of a decreased appetite?: No  Appetite score: 0       Weight History:  Wt Readings from Last 5 Encounters:   07/06/23 88.6 kg (195 lb 5.2 oz)   06/12/23 77.1 kg (170 lb)   10/20/22 77.1 kg (170 lb)   08/26/22 77.2 kg (170 lb 3.1 oz)   08/10/22  76.6 kg (168 lb 14 oz)        Lab/Procedures/Meds: Pertinent Labs/Meds Reviewed    Medications:Pertinent Medications Reviewed  Scheduled Meds:   enoxparin  40 mg Subcutaneous Daily    levalbuterol  1.25 mg Nebulization Q8H    And    ipratropium  0.5 mg Nebulization Q8H    meropenem (MERREM) IVPB  1 g Intravenous Q8H    midodrine  10 mg Per G Tube TID    mupirocin   Nasal BID     Continuous Infusions:   sodium chloride 0.9% 100 mL/hr at 07/07/23 0819     PRN Meds:.acetaminophen, magnesium oxide, magnesium oxide, potassium bicarbonate, potassium bicarbonate, potassium bicarbonate, potassium, sodium phosphates    Labs: Pertinent Labs Reviewed  Clinical Chemistry:  Recent Labs   Lab 07/06/23  1041 07/07/23  0321    138   K 3.5 3.2*    109   CO2 26 26   GLU 93 81   BUN 27* 24*   CREATININE 0.3* <0.3*   CALCIUM 9.1 8.5*   PROT 9.5* 8.5*   ALBUMIN 3.3* 2.9*   BILITOT 0.7 0.5   ALKPHOS 165* 143*   AST 29 23   ALT 34 30   ANIONGAP 9 3*   LIPASE 26  --      CBC:   Recent Labs   Lab 07/07/23  0321   WBC 5.69   RBC 2.90*   HGB 7.5*   HCT 25.8*      MCV 89   MCH 25.9*   MCHC 29.1*     Thyroid & Parathyroid:  Recent Labs   Lab 07/06/23  1041   TSH 2.510       Monitor and Evaluation  Food and Nutrient Intake: enteral nutrition intake  Food and Nutrient Adminstration: enteral and parenteral nutrition administration  Knowledge/Beliefs/Attitudes: beliefs and attitudes, food and nutrition knowledge/skill  Physical Activity and Function: nutrition-related ADLs and IADLs, factors affecting access to physical activity  Anthropometric Measurements: weight, weight change, body mass index  Biochemical Data, Medical Tests and Procedures: lipid profile, inflammatory profile, glucose/endocrine profile, gastrointestinal profile, electrolyte and renal panel  Nutrition-Focused Physical Findings: overall appearance     Nutrition Risk  Level of Risk/Frequency of Follow-up: high     Nutrition Follow-Up  RD Follow-up?:  Yes      Estrella Jo, JEFFERY 07/07/2023 9:32 AM

## 2023-07-08 LAB
ALBUMIN SERPL BCP-MCNC: 2.7 G/DL (ref 3.5–5.2)
ALP SERPL-CCNC: 135 U/L (ref 55–135)
ALT SERPL W/O P-5'-P-CCNC: 27 U/L (ref 10–44)
ANION GAP SERPL CALC-SCNC: 5 MMOL/L (ref 8–16)
AST SERPL-CCNC: 27 U/L (ref 10–40)
BACTERIA SPEC AEROBE CULT: ABNORMAL
BILIRUB SERPL-MCNC: 0.9 MG/DL (ref 0.1–1)
BUN SERPL-MCNC: 17 MG/DL (ref 6–20)
CALCIUM SERPL-MCNC: 8.2 MG/DL (ref 8.7–10.5)
CHLORIDE SERPL-SCNC: 109 MMOL/L (ref 95–110)
CO2 SERPL-SCNC: 24 MMOL/L (ref 23–29)
CREAT SERPL-MCNC: 0.4 MG/DL (ref 0.5–1.4)
ERYTHROCYTE [DISTWIDTH] IN BLOOD BY AUTOMATED COUNT: 20.9 % (ref 11.5–14.5)
EST. GFR  (NO RACE VARIABLE): >60 ML/MIN/1.73 M^2
GLUCOSE SERPL-MCNC: 81 MG/DL (ref 70–110)
GRAM STN SPEC: ABNORMAL
HCT VFR BLD AUTO: 24.2 % (ref 37–48.5)
HGB BLD-MCNC: 7 G/DL (ref 12–16)
MCH RBC QN AUTO: 25.9 PG (ref 27–31)
MCHC RBC AUTO-ENTMCNC: 28.9 G/DL (ref 32–36)
MCV RBC AUTO: 90 FL (ref 82–98)
PLATELET # BLD AUTO: 259 K/UL (ref 150–450)
PMV BLD AUTO: 11.4 FL (ref 9.2–12.9)
POTASSIUM SERPL-SCNC: 3.6 MMOL/L (ref 3.5–5.1)
PROT SERPL-MCNC: 8 G/DL (ref 6–8.4)
RBC # BLD AUTO: 2.7 M/UL (ref 4–5.4)
SODIUM SERPL-SCNC: 138 MMOL/L (ref 136–145)
WBC # BLD AUTO: 5.49 K/UL (ref 3.9–12.7)

## 2023-07-08 PROCEDURE — 25000003 PHARM REV CODE 250: Performed by: INTERNAL MEDICINE

## 2023-07-08 PROCEDURE — 94799 UNLISTED PULMONARY SVC/PX: CPT

## 2023-07-08 PROCEDURE — 99900026 HC AIRWAY MAINTENANCE (STAT)

## 2023-07-08 PROCEDURE — 25000242 PHARM REV CODE 250 ALT 637 W/ HCPCS: Performed by: INTERNAL MEDICINE

## 2023-07-08 PROCEDURE — 27000221 HC OXYGEN, UP TO 24 HOURS

## 2023-07-08 PROCEDURE — 99233 PR SUBSEQUENT HOSPITAL CARE,LEVL III: ICD-10-PCS | Mod: ,,, | Performed by: INTERNAL MEDICINE

## 2023-07-08 PROCEDURE — 99900031 HC PATIENT EDUCATION (STAT)

## 2023-07-08 PROCEDURE — 31502 CHANGE OF WINDPIPE AIRWAY: CPT

## 2023-07-08 PROCEDURE — 80053 COMPREHEN METABOLIC PANEL: CPT | Performed by: INTERNAL MEDICINE

## 2023-07-08 PROCEDURE — 94003 VENT MGMT INPAT SUBQ DAY: CPT

## 2023-07-08 PROCEDURE — 99233 SBSQ HOSP IP/OBS HIGH 50: CPT | Mod: ,,, | Performed by: INTERNAL MEDICINE

## 2023-07-08 PROCEDURE — 20000000 HC ICU ROOM

## 2023-07-08 PROCEDURE — 94761 N-INVAS EAR/PLS OXIMETRY MLT: CPT

## 2023-07-08 PROCEDURE — 99900035 HC TECH TIME PER 15 MIN (STAT)

## 2023-07-08 PROCEDURE — 63600175 PHARM REV CODE 636 W HCPCS: Performed by: INTERNAL MEDICINE

## 2023-07-08 PROCEDURE — 85027 COMPLETE CBC AUTOMATED: CPT | Performed by: INTERNAL MEDICINE

## 2023-07-08 PROCEDURE — 94640 AIRWAY INHALATION TREATMENT: CPT

## 2023-07-08 RX ORDER — MORPHINE SULFATE 2 MG/ML
2 INJECTION, SOLUTION INTRAMUSCULAR; INTRAVENOUS
Status: DISCONTINUED | OUTPATIENT
Start: 2023-07-08 | End: 2023-07-10 | Stop reason: HOSPADM

## 2023-07-08 RX ORDER — GENTAMICIN SULFATE 3 MG/ML
1 SOLUTION/ DROPS OPHTHALMIC 4 TIMES DAILY
Status: DISCONTINUED | OUTPATIENT
Start: 2023-07-08 | End: 2023-07-10 | Stop reason: HOSPADM

## 2023-07-08 RX ADMIN — GENTAMICIN SULFATE 1 DROP: 3 SOLUTION OPHTHALMIC at 01:07

## 2023-07-08 RX ADMIN — LEVALBUTEROL HYDROCHLORIDE 1.25 MG: 1.25 SOLUTION RESPIRATORY (INHALATION) at 07:07

## 2023-07-08 RX ADMIN — LEVALBUTEROL HYDROCHLORIDE 1.25 MG: 1.25 SOLUTION RESPIRATORY (INHALATION) at 12:07

## 2023-07-08 RX ADMIN — MEROPENEM 1 G: 1 INJECTION, POWDER, FOR SOLUTION INTRAVENOUS at 05:07

## 2023-07-08 RX ADMIN — MICONAZOLE NITRATE: 20 CREAM TOPICAL at 08:07

## 2023-07-08 RX ADMIN — MIDODRINE HYDROCHLORIDE 10 MG: 2.5 TABLET ORAL at 03:07

## 2023-07-08 RX ADMIN — ACETAMINOPHEN 650 MG: 325 TABLET ORAL at 08:07

## 2023-07-08 RX ADMIN — POTASSIUM BICARBONATE 50 MEQ: 977.5 TABLET, EFFERVESCENT ORAL at 05:07

## 2023-07-08 RX ADMIN — MEROPENEM 1 G: 1 INJECTION, POWDER, FOR SOLUTION INTRAVENOUS at 08:07

## 2023-07-08 RX ADMIN — IPRATROPIUM BROMIDE 0.5 MG: 0.5 SOLUTION RESPIRATORY (INHALATION) at 04:07

## 2023-07-08 RX ADMIN — GENTAMICIN SULFATE 1 DROP: 3 SOLUTION OPHTHALMIC at 09:07

## 2023-07-08 RX ADMIN — IPRATROPIUM BROMIDE 0.5 MG: 0.5 SOLUTION RESPIRATORY (INHALATION) at 12:07

## 2023-07-08 RX ADMIN — MUPIROCIN 1 G: 20 OINTMENT TOPICAL at 08:07

## 2023-07-08 RX ADMIN — MIDODRINE HYDROCHLORIDE 10 MG: 2.5 TABLET ORAL at 08:07

## 2023-07-08 RX ADMIN — MICONAZOLE NITRATE: 20 CREAM TOPICAL at 09:07

## 2023-07-08 RX ADMIN — MIDODRINE HYDROCHLORIDE 10 MG: 2.5 TABLET ORAL at 09:07

## 2023-07-08 RX ADMIN — MEROPENEM 1 G: 1 INJECTION, POWDER, FOR SOLUTION INTRAVENOUS at 12:07

## 2023-07-08 RX ADMIN — SODIUM CHLORIDE: 0.9 INJECTION, SOLUTION INTRAVENOUS at 01:07

## 2023-07-08 RX ADMIN — ENOXAPARIN SODIUM 40 MG: 100 INJECTION SUBCUTANEOUS at 05:07

## 2023-07-08 RX ADMIN — LEVALBUTEROL HYDROCHLORIDE 1.25 MG: 1.25 SOLUTION RESPIRATORY (INHALATION) at 04:07

## 2023-07-08 RX ADMIN — IPRATROPIUM BROMIDE 0.5 MG: 0.5 SOLUTION RESPIRATORY (INHALATION) at 11:07

## 2023-07-08 RX ADMIN — GENTAMICIN SULFATE 1 DROP: 3 SOLUTION OPHTHALMIC at 10:07

## 2023-07-08 RX ADMIN — LEVALBUTEROL HYDROCHLORIDE 1.25 MG: 1.25 SOLUTION RESPIRATORY (INHALATION) at 11:07

## 2023-07-08 RX ADMIN — IPRATROPIUM BROMIDE 0.5 MG: 0.5 SOLUTION RESPIRATORY (INHALATION) at 07:07

## 2023-07-08 RX ADMIN — GENTAMICIN SULFATE 1 DROP: 3 SOLUTION OPHTHALMIC at 05:07

## 2023-07-08 RX ADMIN — MUPIROCIN 1 G: 20 OINTMENT TOPICAL at 09:07

## 2023-07-08 NOTE — PROGRESS NOTES
Therapy with Vancomycin order discontinued by Dr. Perez on 7/8/23 @ 10:15 am.   Pharmacy will sign off, please re-consult as needed.    Thank you for allowing us to participate in this patient's care.  Cori Fairbanks 7/8/2023 10:30 AM  Dept of Pharmacy  Ext [phone]

## 2023-07-08 NOTE — PLAN OF CARE
07/08/23 0707   Patient Assessment/Suction   Level of Consciousness (AVPU) responds to voice   Respiratory Effort Unlabored   Expansion/Accessory Muscles/Retractions no use of accessory muscles   All Lung Fields Breath Sounds coarse;diminished   MAYNOR Breath Sounds coarse;diminished   LLL Breath Sounds diminished   RUL Breath Sounds coarse;rhonchi   RML Breath Sounds coarse;rhonchi   RLL Breath Sounds diminished   Rhythm/Pattern, Respiratory assisted mechanically;tachypneic   Cough Frequency with stimulation   Cough Type assisted   Suction Method tracheal   Suction Pressure (mmHg) -120 mmHg   $ Suction Charges Inline Suction Procedure Stat Charge   Secretions Amount moderate   Secretions Color yellow;white   Secretions Characteristics thick   Aspirate Toleration TENA (no adverse reactions)   Skin Integrity   $ Wound Care Tech Time 15 min   Area Observed Neck under tracheostomy;Neck   Skin Appearance without discoloration   PRE-TX-O2   Device (Oxygen Therapy) ventilator   $ Is the patient on Low Flow Oxygen? Yes   Oxygen Concentration (%) 50   SpO2 96 %   Pulse Oximetry Type Continuous   $ Pulse Oximetry - Multiple Charge Pulse Oximetry - Multiple   Oximetry Probe Site Assessed;Intact   Pulse 82   Resp (!) 28   Aerosol Therapy   $ Aerosol Therapy Charges Aerosol Treatment   Daily Review of Necessity (SVN) completed   Respiratory Treatment Status (SVN) given   Treatment Route (SVN) in-line;ventilator   Patient Position (SVN) HOB elevated   Post Treatment Assessment (SVN) breath sounds improved   Signs of Intolerance (SVN) none   Breath Sounds Post-Respiratory Treatment   Throughout All Fields Post-Treatment All Fields   Throughout All Fields Post-Treatment aeration increased   Post-treatment Heart Rate (beats/min) 78   Post-treatment Resp Rate (breaths/min) 37   Adult Surgical Airway Philip Cuffed 6.0/ 75mm   No placement date or time found.   Present Prior to Hospital Arrival?: Yes  Type: Tracheostomy  Brand: Philip   Airway Device Style: Cuffed  Airway Device Size: 6.0/ 75mm   Cuff Inflation? Inflated   Status Secured   Site Assessment Clean;Dry;No bleeding   Ties Assessment Clean;Dry   General Safety Checklist   Safety Promotion/Fall Prevention side rails raised   Airway Safety   Is Ambu Bag and Mask with Patient? Yes, Adult Ambu Bag and Mask   Suction set is at the bedside? Yes   Extra trach at bedside? Yes   Extra trach sizes at bedside? 8   Is Obturator Available? Yes   Location of Obturator?  Bedside table   Vent Select   Conventional Vent Y   $ Ventilator Subsequent 1   Charged w/in last 24h YES   Preset Conventional Ventilator Settings   Vent ID [id number]   Vent Type    Ventilation Type VC   Vent Mode A/C   Humidity HME   Set Rate 20 BPM   Vt Set 400 mL   PEEP/CPAP 8 cmH20   Peak Flow 65 L/min   Peak End Inspiratory Pressure 28 cmH20   Plateau Set/Insp. Hold (sec) 30   I-Trigger Type  V-TRIG   Trigger Sensitivity Flow/I-Trigger 3 L/min   Patient Ventilator Parameters   Resp Rate Total 35 br/min   Peak Airway Pressure 29 cmH20   Mean Airway Pressure 17 cmH20   Plateau Pressure 9.6 cmH20   Exhaled Vt 364 mL   Total Ve 12.1 L/m   I:E Ratio Measured 1:1.60   Auto PEEP 0 cmH20   Conventional Ventilator Alarms   Alarms On Y   Ve High Alarm 24 L/min   Ve Low Alarm 5 L/min   Vt High Alarm 1200 mL   Vt Low Alarm 200 mL   Resp Rate High Alarm 45 br/min   Press High Alarm 50 cmH2O   Apnea Rate 10   Apnea Volume (mL) 0 mL   Apnea Oxygen Concentration  100   Apnea Flow Rate (L/min) 63   T Apnea 20 sec(s)   IHI Ventilator Associated Pneumonia Bundle (Required)   Daily Awakening Trials Performed Not applicable   Daily Assessment of Readiness to Extubate No (Comment)   Head of Bed Elevated  HOB 30   Vent Circut Breaks Minimized Yes   Ready to Wean/Extubation Screen   FIO2<=50 (chart decimal) 0.5   MV<16L (chart vol.) 12.1   PEEP <=8 (chart #) 8   Education   $ Education Ventilator Oxygen;Trach Care   Respiratory Evaluation   $ Care Plan  Tech Time 15 min   $ Eval/Re-eval Charges Re-evaluation

## 2023-07-08 NOTE — PLAN OF CARE
Problem: Infection  Goal: Absence of Infection Signs and Symptoms  7/8/2023 0549 by Shonda Lemons RN  Outcome: Ongoing, Progressing  7/8/2023 0549 by Shonda Lemons RN  Outcome: Adequate for Care Transition  7/8/2023 0517 by Shonda Lemons RN  Outcome: Ongoing, Not Progressing     Problem: Adult Inpatient Plan of Care  Goal: Plan of Care Review  7/8/2023 0549 by Shonda Lemons RN  Outcome: Ongoing, Progressing  7/8/2023 0549 by Shonda Lemons RN  Outcome: Adequate for Care Transition  7/8/2023 0517 by Shonda Lemons RN  Outcome: Ongoing, Progressing  Goal: Patient-Specific Goal (Individualized)  7/8/2023 0549 by Shonda Lemons RN  Outcome: Ongoing, Progressing  7/8/2023 0549 by Shonda Lemons RN  Outcome: Adequate for Care Transition  7/8/2023 0517 by Shonda Lemons RN  Outcome: Ongoing, Not Progressing  Goal: Absence of Hospital-Acquired Illness or Injury  7/8/2023 0549 by Shonda Lemons RN  Outcome: Ongoing, Progressing  7/8/2023 0549 by Shonda Lemons RN  Outcome: Adequate for Care Transition  7/8/2023 0517 by Shonda Lemons RN  Outcome: Ongoing, Not Progressing  Goal: Optimal Comfort and Wellbeing  7/8/2023 0549 by Shonda Lemons RN  Outcome: Ongoing, Progressing  7/8/2023 0549 by Shonda Lemons RN  Outcome: Adequate for Care Transition  7/8/2023 0517 by Shonda Lemons RN  Outcome: Ongoing, Progressing  Goal: Readiness for Transition of Care  7/8/2023 0549 by Shonda Lemons RN  Outcome: Ongoing, Progressing  7/8/2023 0549 by Shonda Lemons RN  Outcome: Adequate for Care Transition  7/8/2023 0517 by Shonda Lemons RN  Outcome: Ongoing, Progressing     Problem: Adult Inpatient Plan of Care  Goal: Patient-Specific Goal (Individualized)  7/8/2023 0549 by Shonda Lemons RN  Outcome: Ongoing, Progressing  7/8/2023 0549 by Shonda Lemons RN  Outcome: Adequate for Care Transition  7/8/2023 0517 by Shonda Lemons, RN  Outcome: Ongoing,  Not Progressing     Problem: Adult Inpatient Plan of Care  Goal: Absence of Hospital-Acquired Illness or Injury  7/8/2023 0549 by Shonda Lemons RN  Outcome: Ongoing, Progressing  7/8/2023 0549 by Shonda Lemons RN  Outcome: Adequate for Care Transition  7/8/2023 0517 by Shonda Lemons RN  Outcome: Ongoing, Not Progressing     Problem: Adult Inpatient Plan of Care  Goal: Readiness for Transition of Care  7/8/2023 0549 by Shonda Lemons RN  Outcome: Ongoing, Progressing  7/8/2023 0549 by Shonda Lemons RN  Outcome: Adequate for Care Transition  7/8/2023 0517 by Shonda Lemons RN  Outcome: Ongoing, Progressing     Problem: Skin Injury Risk Increased  Goal: Skin Health and Integrity  7/8/2023 0549 by Shonda Lemons RN  Outcome: Ongoing, Progressing  7/8/2023 0549 by Shonda Lemons RN  Outcome: Adequate for Care Transition  7/8/2023 0517 by Shonda Lemons RN  Outcome: Ongoing, Progressing     Problem: Nutrition Impairment (Mechanical Ventilation, Invasive)  Goal: Optimal Nutrition Delivery  7/8/2023 0549 by Shonda Lemons RN  Outcome: Ongoing, Progressing  7/8/2023 0549 by Shonda Lemons RN  Outcome: Adequate for Care Transition  7/8/2023 0517 by Shonda Lemons RN  Outcome: Ongoing, Progressing     Problem: Skin and Tissue Injury (Mechanical Ventilation, Invasive)  Goal: Absence of Device-Related Skin and Tissue Injury  7/8/2023 0549 by Shonda Lemons RN  Outcome: Ongoing, Progressing  7/8/2023 0549 by Shonda Lemons RN  Outcome: Adequate for Care Transition  7/8/2023 0517 by Shonda Lemons RN  Outcome: Ongoing, Progressing     Problem: Ventilator-Induced Lung Injury (Mechanical Ventilation, Invasive)  Goal: Absence of Ventilator-Induced Lung Injury  7/8/2023 0549 by Shonda Lemons RN  Outcome: Ongoing, Progressing  7/8/2023 0549 by Shonda Lemons RN  Outcome: Adequate for Care Transition  7/8/2023 0517 by Shonda Lemons RN  Outcome: Ongoing,  Progressing

## 2023-07-08 NOTE — PLAN OF CARE
Pt's trach continues to have air leak, O2 sats remained >94%, pt opens eyes but does not do anything purposeful. Wounds noted, pictures taken, wound care performed. Turned q 2. Hamilton catheter draining yellow urine with sediment. Heel boots remained in place. NS infusing @ 100 ml/hr.  Problem: Infection  Goal: Absence of Infection Signs and Symptoms  Outcome: Ongoing, Progressing     Problem: Adult Inpatient Plan of Care  Goal: Plan of Care Review  Outcome: Ongoing, Progressing  Goal: Patient-Specific Goal (Individualized)  Outcome: Ongoing, Progressing  Goal: Absence of Hospital-Acquired Illness or Injury  Outcome: Ongoing, Progressing  Goal: Optimal Comfort and Wellbeing  Outcome: Ongoing, Progressing  Goal: Readiness for Transition of Care  Outcome: Ongoing, Progressing     Problem: Skin Injury Risk Increased  Goal: Skin Health and Integrity  Outcome: Ongoing, Progressing     Problem: Communication Impairment (Mechanical Ventilation, Invasive)  Goal: Effective Communication  Outcome: Ongoing, Progressing     Problem: Device-Related Complication Risk (Mechanical Ventilation, Invasive)  Goal: Optimal Device Function  Outcome: Ongoing, Progressing     Problem: Inability to Wean (Mechanical Ventilation, Invasive)  Goal: Mechanical Ventilation Liberation  Outcome: Ongoing, Progressing     Problem: Nutrition Impairment (Mechanical Ventilation, Invasive)  Goal: Optimal Nutrition Delivery  Outcome: Ongoing, Progressing     Problem: Skin and Tissue Injury (Mechanical Ventilation, Invasive)  Goal: Absence of Device-Related Skin and Tissue Injury  Outcome: Ongoing, Progressing     Problem: Ventilator-Induced Lung Injury (Mechanical Ventilation, Invasive)  Goal: Absence of Ventilator-Induced Lung Injury  Outcome: Ongoing, Progressing     Problem: Communication Impairment (Artificial Airway)  Goal: Effective Communication  Outcome: Ongoing, Progressing     Problem: Device-Related Complication Risk (Artificial Airway)  Goal:  Optimal Device Function  Outcome: Ongoing, Progressing     Problem: Skin and Tissue Injury (Artificial Airway)  Goal: Absence of Device-Related Skin or Tissue Injury  Outcome: Ongoing, Progressing     Problem: Noninvasive Ventilation Acute  Goal: Effective Unassisted Ventilation and Oxygenation  Outcome: Ongoing, Progressing     Problem: Aspiration (Enteral Nutrition)  Goal: Absence of Aspiration Signs and Symptoms  Outcome: Ongoing, Progressing     Problem: Device-Related Complication Risk (Enteral Nutrition)  Goal: Safe, Effective Therapy Delivery  Outcome: Ongoing, Progressing     Problem: Feeding Intolerance (Enteral Nutrition)  Goal: Feeding Tolerance  Outcome: Ongoing, Progressing     Problem: Impaired Wound Healing  Goal: Optimal Wound Healing  Outcome: Ongoing, Progressing

## 2023-07-08 NOTE — RESPIRATORY THERAPY
07/07/23 1940   Patient Assessment/Suction   Respiratory Effort Unlabored   Expansion/Accessory Muscles/Retractions no use of accessory muscles   All Lung Fields Breath Sounds coarse   Rhythm/Pattern, Respiratory assisted mechanically   Cough Frequency with stimulation   Cough Type assisted   Suction Method oral;tracheal   Suction Pressure (mmHg) -120 mmHg   $ Suction Charges Inline Suction Procedure Stat Charge   Secretions Amount moderate   Secretions Color white;yellow   Secretions Characteristics thin   Skin Integrity   $ Wound Care Tech Time 15 min   Area Observed Neck;Neck under tracheostomy   Skin Appearance without discoloration   PRE-TX-O2   Device (Oxygen Therapy) ventilator   $ Is the patient on Low Flow Oxygen? Yes   Oxygen Concentration (%) 40   SpO2 (!) 89 %   Pulse Oximetry Type Continuous   $ Pulse Oximetry - Multiple Charge Pulse Oximetry - Multiple   Pulse 76   Resp (!) 28   Adult Surgical Airway Shiley Cuffed 6.0/ 75mm   No placement date or time found.   Present Prior to Hospital Arrival?: Yes  Type: Tracheostomy  Brand: Shiley  Airway Device Style: Cuffed  Airway Device Size: 6.0/ 75mm   Cuff Inflation? Inflated   Status Secured   Site Assessment Clean;Dry   Site Care Cleansed;Dried;Dressing applied   Inner Cannula Care Changed/new   Ties Assessment Clean;Dry;Intact;Secure   Airway Safety   Is Ambu Bag and Mask with Patient? Yes, Adult Ambu Bag and Mask   Suction set is at the bedside? Yes   Extra trach at bedside? Yes   Extra trach sizes at bedside? 8   Is Obturator Available? Yes   Location of Obturator?  Bedside table   Respiratory Interventions   Airway/Ventilation Management airway patency maintained   Vent Select   Conventional Vent Y   Charged w/in last 24h NO   Preset Conventional Ventilator Settings   Vent ID [id number]   Vent Type    Ventilation Type VC   Vent Mode A/C   Humidity HME   Set Rate 20 BPM   Vt Set 400 mL   PEEP/CPAP 10 cmH20   Waveform RAMP   Peak Flow 65 L/min   Peak End  Inspiratory Pressure 29 cmH20   I-Trigger Type  V-TRIG   Trigger Sensitivity Flow/I-Trigger 3 L/min   Patient Ventilator Parameters   Resp Rate Total 28 br/min   Peak Airway Pressure 34 cmH20   Mean Airway Pressure 18 cmH20   Plateau Pressure 23 cmH20   Exhaled Vt 356 mL   Total Ve 9.88 L/m   I:E Ratio Measured 1:2.20   Auto PEEP 0 cmH20   Tubing ID (mm) 6 mm   Tube Type Trach   Conventional Ventilator Alarms   Alarms On Y   Resp Rate High Alarm 45 br/min   Press High Alarm 50 cmH2O   Apnea Rate 10   Apnea Volume (mL) 0 mL   Apnea Oxygen Concentration  100   Apnea Flow Rate (L/min) 63   T Apnea 20 sec(s)   Ready to Wean/Extubation Screen   FIO2<=50 (chart decimal) 0.4   MV<16L (chart vol.) 9.88   PEEP <=8 (chart #) (!) 10   Ready to Wean Parameters   F/VT Ratio<105 (RSBI) (!) 78.65   Vital Capacity   Vital Capacity (mL) 0   Education   $ Education Bronchodilator;Suction;Trach Care;Ventilator Oxygen;15 min   Respiratory Evaluation   $ Care Plan Tech Time 15 min   $ Eval/Re-eval Charges Re-evaluation

## 2023-07-08 NOTE — ASSESSMENT & PLAN NOTE
Supplement potassium and monitor level.    Potassium   Date Value Ref Range Status   07/07/2023 3.2 (L) 3.5 - 5.1 mmol/L Final

## 2023-07-08 NOTE — ASSESSMENT & PLAN NOTE
Patient's anemia is currently controlled. Has not received any PRBCs to date.. Etiology likely d/t chronic disease.  Current CBC reviewed-   Lab Results   Component Value Date    HGB 7.5 (L) 07/07/2023    HCT 25.8 (L) 07/07/2023     Monitor serial CBC and transfuse if patient becomes hemodynamically unstable, symptomatic or H/H drops below 7/21.

## 2023-07-08 NOTE — ASSESSMENT & PLAN NOTE
Cortisol level low, despite her being sick.  She may need formal ACTH stimulation testing.  Low cortisol could explain the hypothermia.

## 2023-07-08 NOTE — ASSESSMENT & PLAN NOTE
Continue IVAB.  Review of past cultures done.   She's at high risk for MDRO's.    Antibiotics (From admission, onward)    Start     Stop Route Frequency Ordered    07/08/23 1000  vancomycin 1.5 g in dextrose 5 % 250 mL IVPB (ready to mix)        Note to Pharmacy: Ht: 5' (1.524 m)  Wt: 88.6 kg (195 lb 5.2 oz)  CrCl cannot be calculated (This lab value cannot be used to calculate CrCl because it is not a number: <0.3).  Body mass index is 38.15 kg/m².    -- IV Every 12 hours (non-standard times) 07/07/23 2055 07/07/23 2200  vancomycin (VANCOCIN) 2,000 mg in dextrose 5 % (D5W) 500 mL IVPB         -- IV Once 07/07/23 2055 07/07/23 2151  vancomycin - pharmacy to dose  (vancomycin IVPB (PEDS and ADULTS))        See Hyperspace for full Linked Orders Report.    -- IV pharmacy to manage frequency 07/07/23 2051 07/07/23 0100  meropenem 1 g in sodium chloride 0.9 % 100 mL IVPB (ready to mix system)         -- IV Every 8 hours (non-standard times) 07/06/23 1806    07/06/23 2100  mupirocin 2 % ointment         07/11 2059 Nasl 2 times daily 07/06/23 1628

## 2023-07-08 NOTE — PROGRESS NOTES
Consult Note  Infectious Disease    Reason for Consult:  Antibiotic recommendations    HPI: Genna Felix is a 62 y.o. female  resident of [facility], with past medical history of anoxic brain injury status post tracheostomy/peg tube, prior PE, hypertension, GERD and history of multidrug resistant organisms  , most recently seen by our service in early August of 2022 for septic shock secondary to pneumonia and by [facility] main ID late August for right arm cellulitis.  She was seen in our emergency room June 12 for placement of a fallen out PEG tube.  She was sent to the emergency room this morning with bradycardia to the 40s, hypotension to the 80s, f hypothermic ound to have scattered rhonchi, markedly distended abdomen, normal white blood cell count, urinalysis with 95 white blood cells.  CT scan of the abdomen was performed showed improvement compared to ?  August 2022 CT of the right lower lung zone atelectasis and/or consolidation, cholelithiasis periumbilical hernia.  Chest x-ray shows slight increase in fibrotic background.  Review of NH MAR does not include any antibiotics    7/7: Interim reviewed. Temps are improved, under Beth Hugger cortisol yesterday was 11.8 at 6 pm but a low 3.3 at 0400 today.  Her urine output is very poor despite another 1100 cc of fluid overnight.  The urine quality is also still purulent.  Blood pressure is a little low today, was hypertensive in the ER  7/8: Interim reviewed.  Temperatures are more normal and the Beth Hugger was only on low.  Hemodynamically stable.  Cultures and stimulation test is adequate at 9.7 to 18-19 at 30 and 60 minutes.  Blood cultures had 1 bottle with a coagulase-negative staph turn positive overnight, temporarily given vancomycin.  Urine culture has 2 g negative rods to be identified, 1 of which is a presumptive Proteus.  Urine output remains poor, white blood cells normal, hemoglobin down to 7.0, creatinine 0.4, albumin 2.7.  No new  imaging      EXAM & DIAGNOSTICS REVIEWED:   Vitals:     Temp:  [97.1 °F (36.2 °C)-98 °F (36.7 °C)]   Temp: 97.8 °F (36.6 °C) (07/08/23 0730)  Pulse: 85 (07/08/23 0730)  Resp: (!) 30 (07/08/23 0730)  BP: (!) 99/54 (07/08/23 0730)  SpO2: (!) 94 % (07/08/23 0730)    Intake/Output Summary (Last 24 hours) at 7/8/2023 0845  Last data filed at 7/8/2023 0600  Gross per 24 hour   Intake 3943.33 ml   Output 1410 ml   Net 2533.33 ml       General:  In NAD. Eyes open, does not attend  Eyes:  Anicteric, PERRL, EOM cannot be tested. Sclera are injected, left greater than right with a small amount of exudate from the left eye  ENT:  Unable to examine due to high tone of jaw  Neck:  supple, tracheostomy to vent  Lungs: Clear, no consolidation, rales, wheezes, rub, secretions minimal. Grunting respirations when disturbed  Heart:  RRR, no gallop/murmur/rub noted  Abd:  Distended, hypoactive BS,no grimace with palpation.  no masses or organomegaly appreciated. PEG without redness, tolerating tube feeding  :   Hamilton, urine improved to poor output, no hematuria  Musc:  Joints without effusion, swelling, erythema, synovitis, with high tone and flexion contractures  Skin:  Tinea pedis left foot, scaly skin on plantar surfaces  Neuro:           Arousable, does not attend, non verbal, occasionally grunting. Flexion contractures of hands and arms.  Psych:   Unable to assess  Lymphatic:     No cervical, supraclavicular nodes  Extrem: Chronic firm edema,without erythema, phlebitis, cellulitis, warm and well perfused  VAD:  peripherals Left arm midline 7/7    Isolation:  contact  Wound:   Right dorsal hand with erosion, ?from prior IV, non infected  Left side of sacrum, stage 3, non infected       Cortisol ug/dL 18.9  17.9 CM  9.7        General Labs reviewed:  Recent Labs   Lab 07/06/23  1041 07/07/23  0321 07/08/23  0310   WBC 5.94 5.69 5.49   HGB 8.3* 7.5* 7.0*   HCT 29.1* 25.8* 24.2*    255 259       Recent Labs   Lab  07/06/23  1041 07/07/23  0321 07/08/23  0310    138 138   K 3.5 3.2* 3.6    109 109   CO2 26 26 24   BUN 27* 24* 17   CREATININE 0.3* <0.3* 0.4*   CALCIUM 9.1 8.5* 8.2*   PROT 9.5* 8.5* 8.0   BILITOT 0.7 0.5 0.9   ALKPHOS 165* 143* 135   ALT 34 30 27   AST 29 23 27     No results for input(s): CRP in the last 168 hours.      Micro:  Microbiology Results (last 7 days)       Procedure Component Value Units Date/Time    Urine culture [[id number]]  (Abnormal) Collected: 07/06/23 1058    Order Status: Completed Specimen: Urine Updated: 07/08/23 0746     Urine Culture, Routine PRESUMPTIVE PROTEUS SPECIES  >100,000 cfu/ml  Identification and susceptibility pending        GRAM NEGATIVE CHRISTINA, NON-LACTOSE   Identification and susceptibility pending      Narrative:      Collection Type->Urine, Clean Catch    Blood culture [[id number]] Collected: 07/07/23 2141    Order Status: Completed Specimen: Blood Updated: 07/08/23 0717     Blood Culture, Routine No Growth to date    Blood culture #1 **CANNOT BE ORDERED STAT** [[id number]] Collected: 07/06/23 0953    Order Status: Completed Specimen: Blood Updated: 07/08/23 0713     Blood Culture, Routine Gram stain aer bottle: Gram positive cocci      Results called to and read back by: Aster Nielsen RN  07/07/2023      20:36 KS3    Culture, Respiratory with Gram Stain [[id number]]  (Abnormal)  (Susceptibility) Collected: 07/06/23 0913    Order Status: Completed Specimen: Respiratory from Sputum Updated: 07/08/23 0644     Respiratory Culture SERRATIA MARCESCENS  Moderate       Gram Stain (Respiratory) Moderate WBC's     Gram Stain (Respiratory) Few Gram positive cocci     Gram Stain (Respiratory) Few Gram positive rods     Gram Stain (Respiratory) Few Gram negative rods    Rapid Organism ID by PCR (from Blood culture) [[id number]]  (Abnormal) Collected: 07/06/23 0953    Order Status: Completed Updated: 07/07/23 2153     Enterococcus faecalis Not Detected     Enterococcus  faecium Not Detected     Listeria monocytogenes Not Detected     Staphylococcus spp. Detected     Staphylococcus aureus Not Detected     Staphylococcus epidermidis Not Detected     Staphylococcus lugdunensis Not Detected     Streptococcus species Not Detected     Streptococcus agalactiae Not Detected     Streptococcus pneumoniae Not Detected     Streptococcus pyogenes Not Detected     Acinetobacter calcoaceticus/baumannii complex Not Detected     Bacteroides fragilis Not Detected     Enterobacterales Not Detected     Enterobacter cloacae complex Not Detected     Escherichia coli Not Detected     Klebsiella aerogenes Not Detected     Klebsiella oxytoca Not Detected     Klebsiella pneumoniae group Not Detected     Proteus Not Detected     Salmonella sp Not Detected     Serratia marcescens Not Detected     Haemophilus influenzae Not Detected     Neisseria meningtidis Not Detected     Pseudomonas aeruginosa Not Detected     Stenotrophomonas maltophilia Not Detected     Candida albicans Not Detected     Candida auris Not Detected     Candida glabrata Not Detected     Candida krusei Not Detected     Candida parapsilosis Not Detected     Candida tropicalis Not Detected     Cryptococcus neoformans/gattii Not Detected     CTX-M (ESBL ) Test not applicable     IMP (Carbapenem resistant) Test not applicable     KPC resistance gene (Carbapenem resistant) Test not applicable     mcr-1  Test not applicable     mec A/C  Test not applicable     mec A/C and MREJ (MRSA) gene Test not applicable     NDM (Carbapenem resistant) Test not applicable     OXA-48-like (Carbapenem resistant) Test not applicable     van A/B (VRE gene) Test not applicable     VIM (Carbapenem resistant) Test not applicable    Blood culture #2 **CANNOT BE ORDERED STAT** [[id number]] Collected: 07/06/23 0953    Order Status: Completed Specimen: Blood Updated: 07/07/23 1032     Blood Culture, Routine No Growth to date      No Growth to date    MRSA Screen by  PCR [[id number]] Collected: 07/06/23 2005    Order Status: Completed Specimen: Nasopharyngeal Swab from Nasal Updated: 07/06/23 2146     MRSA SCREEN BY PCR Negative            Imaging Reviewed:   CXR   CT  abd/pelvis     Cardiology:    IMPRESSION & PLAN   1.  Bradycardia, resolved    2.  Hypothermia (often how she presents with infection), improved.  Cultures and stimulation test is adequate   Probably due to UTI, urine grossly purulent, culture negative at less than 1 day. 2 GNRs on culture   Lactic acid normal x 2   Procalcitonin normal   Blood culture with contaminant, coag neg staph    3. History of MDRO in sputum without pneumonia on imaging   Pseudomonas on current culture  4. Ventilator dependent, vegetative state  5.  Severe anemia           Recommendations:  Meropenem for history of ESBL UTIs ,   Stop vanc  Wound care  More IVF??  Or blood?     Enhanced contact isolation   (Candida auris negative)    D/w nursing and Dr. Goss    Medical Decision Making during this encounter was  [_] Low Complexity  [_] Moderate Complexity  [  ] High Complexity

## 2023-07-08 NOTE — PROGRESS NOTES
[facility] Medicine  Progress Note    Patient Name: Genna Felix  MRN: [id number]  Patient Class: IP- Inpatient   Admission Date: 7/6/2023  Length of Stay: 1 days  Attending Physician: Lanre Goss MD  Primary Care Provider: Primary Doctor No        Subjective:     Principal Problem:Hypothermia        HPI:  62 year old nursing home pt again getting admitted with hypotension/bradycardia and hypothermia  Pt has h/o anoxic brain injury status post tracheostomy/peg tube  Also  history of multidrug resistant organisms / and has an  indwelling cholecystostomy tube   Per NH records she is not on any abx  Because of unstable vital signs she was send to ER and got admitted        Overview/Hospital Course:  No notes on file    Interval History:  temperature is better.  Pt seen by ID specialist.  Appears that the active infection is in her urinary tract.      Review of Systems   Unable to perform ROS: Patient unresponsive   Objective:     Vital Signs (Most Recent):  Temp: 97.4 °F (36.3 °C) (07/07/23 1930)  Pulse: 69 (07/07/23 2100)  Resp: 18 (07/07/23 2100)  BP: 114/64 (07/07/23 2100)  SpO2: (!) 93 % (07/07/23 2100) Vital Signs (24h Range):  Temp:  [94.8 °F (34.9 °C)-98 °F (36.7 °C)] 97.4 °F (36.3 °C)  Pulse:  [50-96] 69  Resp:  [3-36] 18  SpO2:  [87 %-100 %] 93 %  BP: ()/(47-71) 114/64     Weight: 88.6 kg (195 lb 5.2 oz)  Body mass index is 38.15 kg/m².    Intake/Output Summary (Last 24 hours) at 7/7/2023 2155  Last data filed at 7/7/2023 2100  Gross per 24 hour   Intake 2842.67 ml   Output 1475 ml   Net 1367.67 ml         Physical Exam  Constitutional:       Interventions: She is intubated.   Eyes:      Conjunctiva/sclera:      Right eye: Right conjunctiva is not injected. No exudate.     Left eye: Left conjunctiva is not injected. No exudate.  Neck:      Vascular: No JVD.      Trachea: Tracheostomy present.   Cardiovascular:      Rate and Rhythm: Normal rate and regular rhythm.   Pulmonary:       Effort: She is intubated.      Breath sounds: Rhonchi present.      Comments: Trach connected to ventilator  Abdominal:      General: Bowel sounds are decreased. There is distension.      Palpations: Abdomen is soft.   Musculoskeletal:      Right lower leg: Edema present.      Left lower leg: Edema present.   Skin:     Comments: Stage 3 decubitus on sacrum   Neurological:      Comments: Eyes open.  Doesn't respond.           Significant Labs: All pertinent labs within the past 24 hours have been reviewed.    Significant Imaging: I have reviewed all pertinent imaging results/findings within the past 24 hours.      Assessment/Plan:      * Hypothermia  Probably due to infection.  Continue Beth Hugger.  Monitor temperature.    Decubitus ulcer of sacral region, stage 3  Consult with wound care service.  Ulcer was present prior to current admission.      Hypokalemia  Supplement potassium and monitor level.    Potassium   Date Value Ref Range Status   07/07/2023 3.2 (L) 3.5 - 5.1 mmol/L Final           UTI (urinary tract infection)  Continue IVAB.  Review of past cultures done.   She's at high risk for MDRO's.    Antibiotics (From admission, onward)    Start     Stop Route Frequency Ordered    07/08/23 1000  vancomycin 1.5 g in dextrose 5 % 250 mL IVPB (ready to mix)        Note to Pharmacy: Ht: 5' (1.524 m)  Wt: 88.6 kg (195 lb 5.2 oz)  CrCl cannot be calculated (This lab value cannot be used to calculate CrCl because it is not a number: <0.3).  Body mass index is 38.15 kg/m².    -- IV Every 12 hours (non-standard times) 07/07/23 2055 07/07/23 2200  vancomycin (VANCOCIN) 2,000 mg in dextrose 5 % (D5W) 500 mL IVPB         -- IV Once 07/07/23 2055 07/07/23 2151  vancomycin - pharmacy to dose  (vancomycin IVPB (PEDS and ADULTS))        See Hyperspace for full Linked Orders Report.    -- IV pharmacy to manage frequency 07/07/23 2051 07/07/23 0100  meropenem 1 g in sodium chloride 0.9 % 100 mL IVPB (ready to mix  system)         -- IV Every 8 hours (non-standard times) 07/06/23 1806    07/06/23 2100  mupirocin 2 % ointment         07/11 2059 Nasl 2 times daily 07/06/23 1628              Low serum cortisol level  Cortisol level low, despite her being sick.  She may need formal ACTH stimulation testing.  Low cortisol could explain the hypothermia.      Multiple drug resistant organism (MDRO) culture positive  Aware of this.  In the past      Ventilator dependence  Chronic issue.  Pulmonologist assisting with management.      PEG (percutaneous endoscopic gastrostomy) status  Aware  Nutrition consulted for TFs      Persistent vegetative state  Aware   From anoxic brain damage      Anemia of chronic disease  Patient's anemia is currently controlled. Has not received any PRBCs to date.. Etiology likely d/t chronic disease.  Current CBC reviewed-   Lab Results   Component Value Date    HGB 7.5 (L) 07/07/2023    HCT 25.8 (L) 07/07/2023     Monitor serial CBC and transfuse if patient becomes hemodynamically unstable, symptomatic or H/H drops below 7/21.           VTE Risk Mitigation (From admission, onward)         Ordered     enoxaparin injection 40 mg  Daily         07/06/23 1645     IP VTE HIGH RISK PATIENT  Once         07/06/23 1554     Place sequential compression device  Until discontinued         07/06/23 1554                Discharge Planning   [place]:      Code Status: DNR   Is the patient medically ready for discharge?:     Reason for patient still in hospital (select all that apply): Patient trending condition, Laboratory test, Treatment and Consult recommendations  Discharge Plan A: Return to nursing home                  Lanre Goss MD  Department of Hospital Medicine   [facility]

## 2023-07-08 NOTE — CARE UPDATE
07/08/23 1648   Patient Assessment/Suction   Level of Consciousness (AVPU) responds to voice   Respiratory Effort Unlabored   Expansion/Accessory Muscles/Retractions no use of accessory muscles   All Lung Fields Breath Sounds coarse   MAYNOR Breath Sounds coarse   LLL Breath Sounds diminished   RUL Breath Sounds coarse   RML Breath Sounds coarse   RLL Breath Sounds diminished   Rhythm/Pattern, Respiratory assisted mechanically   Cough Frequency with stimulation   Cough Type assisted   Suction Method tracheal   Suction Pressure (mmHg) -120 mmHg   $ Suction Charges Inline Suction Procedure Stat Charge   Secretions Amount moderate   Secretions Color white   Secretions Characteristics thick   Skin Integrity   $ Wound Care Tech Time 15 min   Area Observed Neck under tracheostomy   Skin Appearance without discoloration   PRE-TX-O2   Device (Oxygen Therapy) ventilator   $ Is the patient on Low Flow Oxygen? Yes   Oxygen Concentration (%) 50   SpO2 95 %   Pulse Oximetry Type Continuous   $ Pulse Oximetry - Multiple Charge Pulse Oximetry - Multiple   Oximetry Probe Site Assessed   Pulse (!) 58   Resp (!) 27   Aerosol Therapy   $ Aerosol Therapy Charges Aerosol Treatment   Daily Review of Necessity (SVN) completed   Respiratory Treatment Status (SVN) given   Treatment Route (SVN) in-line;ventilator   Patient Position (SVN) HOB elevated   Post Treatment Assessment (SVN) breath sounds improved   Signs of Intolerance (SVN) none   Breath Sounds Post-Respiratory Treatment   Throughout All Fields Post-Treatment All Fields   Throughout All Fields Post-Treatment aeration increased   Post-treatment Heart Rate (beats/min) 84   Post-treatment Resp Rate (breaths/min) 27   Adult Surgical Airway Shiley Cuffed 6.0/ 75mm   No placement date or time found.   Present Prior to Hospital Arrival?: Yes  Type: Tracheostomy  Brand: CommonKeyley  Airway Device Style: Cuffed  Airway Device Size: 6.0/ 75mm   Cuff Inflation? Inflated   Status Secured   Site  Assessment Clean;Dry;No drainage   Site Care Cleansed;Dried   Inner Cannula Care Cleansed/dried   Airway Safety   Is Ambu Bag and Mask with Patient? Yes, Adult Ambu Bag and Mask   Extra trach at bedside? Yes   Extra trach sizes at bedside? 8   Is Obturator Available? Yes   Vent Select   Conventional Vent Y   $ Ventilator Subsequent 1   Charged w/in last 24h YES   Preset Conventional Ventilator Settings   Vent ID [id number]   Vent Type    Ventilation Type VC   Vent Mode A/C   Humidity HME   Set Rate 20 BPM   Vt Set 400 mL   PEEP/CPAP 8 cmH20   Peak Flow 65 L/min   Peak End Inspiratory Pressure 9.6 cmH20   I-Trigger Type  V-TRIG   Trigger Sensitivity Flow/I-Trigger 3 L/min   Patient Ventilator Parameters   Resp Rate Total 27 br/min   Peak Airway Pressure 28 cmH20   Mean Airway Pressure 12 cmH20   Plateau Pressure 28 cmH20   Exhaled Vt 273 mL   Total Ve 7.91 L/m   I:E Ratio Measured 1:2.40   Auto PEEP 0 cmH20   Conventional Ventilator Alarms   Alarms On Y   Ve High Alarm 24 L/min   Ve Low Alarm 5 L/min   Vt High Alarm 1200 mL   Vt Low Alarm 200 mL   Resp Rate High Alarm 45 br/min   Press High Alarm 50 cmH2O   Apnea Rate 10   Apnea Volume (mL) 0 mL   Apnea Oxygen Concentration  100   Apnea Flow Rate (L/min) 63   T Apnea 20 sec(s)   Ready to Wean/Extubation Screen   FIO2<=50 (chart decimal) 0.5   MV<16L (chart vol.) 7.91   PEEP <=8 (chart #) 8

## 2023-07-08 NOTE — PLAN OF CARE
Problem: Infection  Goal: Absence of Infection Signs and Symptoms  Outcome: Ongoing, Not Progressing     Problem: Adult Inpatient Plan of Care  Goal: Plan of Care Review  Outcome: Ongoing, Progressing  Goal: Patient-Specific Goal (Individualized)  Outcome: Ongoing, Not Progressing  Goal: Absence of Hospital-Acquired Illness or Injury  Outcome: Ongoing, Not Progressing  Goal: Optimal Comfort and Wellbeing  Outcome: Ongoing, Progressing  Goal: Readiness for Transition of Care  Outcome: Ongoing, Progressing     Problem: Skin Injury Risk Increased  Goal: Skin Health and Integrity  Outcome: Ongoing, Progressing     Problem: Inability to Wean (Mechanical Ventilation, Invasive)  Goal: Mechanical Ventilation Liberation  Outcome: Ongoing, Not Progressing     Problem: Device-Related Complication Risk (Mechanical Ventilation, Invasive)  Goal: Optimal Device Function  Outcome: Ongoing, Not Progressing     Problem: Communication Impairment (Mechanical Ventilation, Invasive)  Goal: Effective Communication  Outcome: Ongoing, Not Progressing     Problem: Ventilator-Induced Lung Injury (Mechanical Ventilation, Invasive)  Goal: Absence of Ventilator-Induced Lung Injury  Outcome: Ongoing, Progressing     Problem: Communication Impairment (Artificial Airway)  Goal: Effective Communication  Outcome: Ongoing, Not Progressing

## 2023-07-08 NOTE — SUBJECTIVE & OBJECTIVE
Interval History:  temperature is better.  Pt seen by ID specialist.  Appears that the active infection is in her urinary tract.      Review of Systems   Unable to perform ROS: Patient unresponsive   Objective:     Vital Signs (Most Recent):  Temp: 97.4 °F (36.3 °C) (07/07/23 1930)  Pulse: 69 (07/07/23 2100)  Resp: 18 (07/07/23 2100)  BP: 114/64 (07/07/23 2100)  SpO2: (!) 93 % (07/07/23 2100) Vital Signs (24h Range):  Temp:  [94.8 °F (34.9 °C)-98 °F (36.7 °C)] 97.4 °F (36.3 °C)  Pulse:  [50-96] 69  Resp:  [3-36] 18  SpO2:  [87 %-100 %] 93 %  BP: ()/(47-71) 114/64     Weight: 88.6 kg (195 lb 5.2 oz)  Body mass index is 38.15 kg/m².    Intake/Output Summary (Last 24 hours) at 7/7/2023 2155  Last data filed at 7/7/2023 2100  Gross per 24 hour   Intake 2842.67 ml   Output 1475 ml   Net 1367.67 ml         Physical Exam  Constitutional:       Interventions: She is intubated.   Eyes:      Conjunctiva/sclera:      Right eye: Right conjunctiva is not injected. No exudate.     Left eye: Left conjunctiva is not injected. No exudate.  Neck:      Vascular: No JVD.      Trachea: Tracheostomy present.   Cardiovascular:      Rate and Rhythm: Normal rate and regular rhythm.   Pulmonary:      Effort: She is intubated.      Breath sounds: Rhonchi present.      Comments: Trach connected to ventilator  Abdominal:      General: Bowel sounds are decreased. There is distension.      Palpations: Abdomen is soft.   Musculoskeletal:      Right lower leg: Edema present.      Left lower leg: Edema present.   Skin:     Comments: Stage 3 decubitus on sacrum   Neurological:      Comments: Eyes open.  Doesn't respond.           Significant Labs: All pertinent labs within the past 24 hours have been reviewed.    Significant Imaging: I have reviewed all pertinent imaging results/findings within the past 24 hours.

## 2023-07-08 NOTE — ASSESSMENT & PLAN NOTE
Chronic issue.  Pulmonologist assisting with management.     Detail Level: Zone Identifies no suspicious BCC activity at the periphery.

## 2023-07-08 NOTE — PROGRESS NOTES
Pulmonary/Critical Care Consult      Patient name: Genna Felix  MRN: [id number]  Date: 07/08/2023    Admit Date: 7/6/2023  Consult Requested By: Lanre Goss MD    Reason for Consult: respiratory failure, chronic ventilator    HPI:    7/7/2023 - 61 yo chronic vent sent to ER for hypothermia and bradycardia, she is unresponsive and not able to provide any history.  Chart has been reviewed.  She has been seen by Dr Perez    7/8/2023 - By report her tach was downsized from an * to 6 at NH.  We now have positional issues and cuff leak and loss of TV at times.  We do not know the timing of the change.  Ppeak is elevated but Pplateau is OK.  She remains unresponsive to me.  She is tachycardic and seems uncomfortable.  H/H has decreased.  SC + serratia, UC + proteus.    Review of Systems    Review of Systems   Unable to perform ROS: Mental acuity     Past Medical History    Past Medical History:   Diagnosis Date    Anoxic brain damage 07/2020    Bedbound 07/2020    Cardiac arrest 07/2020    Cirrhosis     GERD (gastroesophageal reflux disease)     Hemangioma of intra-abdominal structure     Hypertension     Nursing home resident     Protein calorie malnutrition     Pulmonary embolus     Respiratory distress     S/P percutaneous endoscopic gastrostomy (PEG) tube placement 07/2020    Total self-care deficit 07/2020    Tracheostomy dependence     7/2020       Past Surgical History    Past Surgical History:   Procedure Laterality Date    PEG W/TRACHEOSTOMY PLACEMENT  07/27/2020    SKIN GRAFT      buttocks       Medications (scheduled):      enoxparin  40 mg Subcutaneous Daily    gentamicin  1 drop Both Eyes QID    levalbuterol  1.25 mg Nebulization Q8H    And    ipratropium  0.5 mg Nebulization Q8H    meropenem (MERREM) IVPB  1 g Intravenous Q8H    miconazole   Topical (Top) BID    midodrine  10 mg Per G Tube TID    mupirocin   Nasal BID       Medications (infusions):      sodium chloride 0.9% 100 mL/hr at 07/08/23 0147        Medications (prn):     acetaminophen, magnesium oxide, magnesium oxide, potassium bicarbonate, potassium bicarbonate, potassium bicarbonate, potassium, sodium phosphates    Family History:   Family History   Problem Relation Age of Onset    Hypertension Mother     No Known Problems Father        Social History: Tobacco:   Social History     Tobacco Use   Smoking Status Never   Smokeless Tobacco Never                                EtOH:   Social History     Substance and Sexual Activity   Alcohol Use Not Currently                                Drugs:   Social History     Substance and Sexual Activity   Drug Use Not Currently     Physical Exam    Vital signs:  Temp:  [96.7 °F (35.9 °C)-98 °F (36.7 °C)]   Pulse:  [58-95]   Resp:  [9-38]   BP: ()/(49-84)   SpO2:  [89 %-98 %]     Intake/Output:   Intake/Output Summary (Last 24 hours) at 7/8/2023 1227  Last data filed at 7/8/2023 0600  Gross per 24 hour   Intake 3445 ml   Output 1350 ml   Net 2095 ml          BMI: Estimated body mass index is 38.15 kg/m² as calculated from the following:    Height as of this encounter: 5' (1.524 m).    Weight as of this encounter: 88.6 kg (195 lb 5.2 oz).    Physical Exam  Vitals and nursing note reviewed.   Constitutional:       General: She is not in acute distress.     Appearance: She is not ill-appearing, toxic-appearing or diaphoretic.      Comments: Chronically ill  Vent dependent  Eyes open but does not respond to me  Has Beth Steveer on   HENT:      Head: Normocephalic and atraumatic.      Right Ear: External ear normal.      Left Ear: External ear normal.      Nose: Nose normal. No congestion or rhinorrhea.      Mouth/Throat:      Mouth: Mucous membranes are moist.      Pharynx: Oropharynx is clear. No oropharyngeal exudate or posterior oropharyngeal erythema.   Eyes:      General: No scleral icterus.        Right eye: No discharge.         Left eye: No discharge.      Extraocular Movements: Extraocular movements  intact.      Conjunctiva/sclera: Conjunctivae normal.      Pupils: Pupils are equal, round, and reactive to light.   Neck:      Vascular: No carotid bruit.      Comments: Trach in place (there is a report that it was downsized at NH)  Cardiovascular:      Rate and Rhythm: Normal rate and regular rhythm.      Pulses: Normal pulses.      Heart sounds: No murmur heard.    No friction rub. No gallop.   Pulmonary:      Effort: Pulmonary effort is normal. No respiratory distress.      Breath sounds: No stridor. Rhonchi present. No wheezing or rales.      Comments: Ventlated   PEG  Chest:      Chest wall: No tenderness.   Abdominal:      General: There is distension.      Tenderness: There is no abdominal tenderness. There is no guarding.      Comments: PEG  BS hypoactive   Musculoskeletal:         General: No swelling. Normal range of motion.      Cervical back: Normal range of motion and neck supple. No rigidity or tenderness.      Right lower leg: Edema present.      Left lower leg: Edema present.      Comments: Chronic edema   Lymphadenopathy:      Cervical: No cervical adenopathy.   Skin:     General: Skin is warm and dry.      Coloration: Skin is not jaundiced.      Findings: No bruising.      Comments: + sacral wound and hand wound (POA)   Neurological:      Comments: At baseline   Psychiatric:      Comments: Not able to assess  Calm        Laboratory    Recent Labs   Lab 07/08/23  0310   WBC 5.49   RBC 2.70*   HGB 7.0*   HCT 24.2*      MCV 90   MCH 25.9*   MCHC 28.9*         Recent Labs   Lab 07/08/23  0310   CALCIUM 8.2*   PROT 8.0      K 3.6   CO2 24      BUN 17   CREATININE 0.4*   ALKPHOS 135   ALT 27   AST 27   BILITOT 0.9         No results for input(s): PT, INR, APTT in the last 24 hours.    No results for input(s): CPK, CPKMB, TROPONINI, MB in the last 24 hours.    Additional labs: reviewed    Microbiology:       Microbiology Results (last 7 days)       Procedure Component Value Units  Date/Time    Blood culture #2 **CANNOT BE ORDERED STAT** [[id number]] Collected: 07/06/23 0953    Order Status: Completed Specimen: Blood Updated: 07/08/23 1032     Blood Culture, Routine No Growth to date      No Growth to date      No Growth to date    Urine culture [[id number]]  (Abnormal) Collected: 07/06/23 1058    Order Status: Completed Specimen: Urine Updated: 07/08/23 0746     Urine Culture, Routine PRESUMPTIVE PROTEUS SPECIES  >100,000 cfu/ml  Identification and susceptibility pending        GRAM NEGATIVE CHRISTINA, NON-LACTOSE   Identification and susceptibility pending      Narrative:      Collection Type->Urine, Clean Catch    Blood culture [[id number]] Collected: 07/07/23 2141    Order Status: Completed Specimen: Blood Updated: 07/08/23 0717     Blood Culture, Routine No Growth to date    Blood culture #1 **CANNOT BE ORDERED STAT** [[id number]] Collected: 07/06/23 0953    Order Status: Completed Specimen: Blood Updated: 07/08/23 0713     Blood Culture, Routine Gram stain aer bottle: Gram positive cocci      Results called to and read back by: Aster Nielsen RN  07/07/2023      20:36 KS3    Culture, Respiratory with Gram Stain [[id number]]  (Abnormal)  (Susceptibility) Collected: 07/06/23 0913    Order Status: Completed Specimen: Respiratory from Sputum Updated: 07/08/23 0644     Respiratory Culture SERRATIA MARCESCENS  Moderate       Gram Stain (Respiratory) Moderate WBC's     Gram Stain (Respiratory) Few Gram positive cocci     Gram Stain (Respiratory) Few Gram positive rods     Gram Stain (Respiratory) Few Gram negative rods    Rapid Organism ID by PCR (from Blood culture) [[id number]]  (Abnormal) Collected: 07/06/23 0953    Order Status: Completed Updated: 07/07/23 2153     Enterococcus faecalis Not Detected     Enterococcus faecium Not Detected     Listeria monocytogenes Not Detected     Staphylococcus spp. Detected     Staphylococcus aureus Not Detected     Staphylococcus epidermidis Not Detected      Staphylococcus lugdunensis Not Detected     Streptococcus species Not Detected     Streptococcus agalactiae Not Detected     Streptococcus pneumoniae Not Detected     Streptococcus pyogenes Not Detected     Acinetobacter calcoaceticus/baumannii complex Not Detected     Bacteroides fragilis Not Detected     Enterobacterales Not Detected     Enterobacter cloacae complex Not Detected     Escherichia coli Not Detected     Klebsiella aerogenes Not Detected     Klebsiella oxytoca Not Detected     Klebsiella pneumoniae group Not Detected     Proteus Not Detected     Salmonella sp Not Detected     Serratia marcescens Not Detected     Haemophilus influenzae Not Detected     Neisseria meningtidis Not Detected     Pseudomonas aeruginosa Not Detected     Stenotrophomonas maltophilia Not Detected     Candida albicans Not Detected     Candida auris Not Detected     Candida glabrata Not Detected     Candida krusei Not Detected     Candida parapsilosis Not Detected     Candida tropicalis Not Detected     Cryptococcus neoformans/gattii Not Detected     CTX-M (ESBL ) Test not applicable     IMP (Carbapenem resistant) Test not applicable     KPC resistance gene (Carbapenem resistant) Test not applicable     mcr-1  Test not applicable     mec A/C  Test not applicable     mec A/C and MREJ (MRSA) gene Test not applicable     NDM (Carbapenem resistant) Test not applicable     OXA-48-like (Carbapenem resistant) Test not applicable     van A/B (VRE gene) Test not applicable     VIM (Carbapenem resistant) Test not applicable    MRSA Screen by PCR [[id number]] Collected: 07/06/23 2005    Order Status: Completed Specimen: Nasopharyngeal Swab from Nasal Updated: 07/06/23 2146     MRSA SCREEN BY PCR Negative            Radiology    CT Abdomen Pelvis With Contrast  CMS MANDATED QUALITY DATA - CT RADIATION - 436    All CT scans at this facility utilize dose modulation, iterative reconstruction, and/or weight based dosing when  appropriate to reduce radiation dose to as low as reasonably achievable.    Reason: Bowel obstruction suspected; Abd distention, recent hypotension Bowel obstruction suspected; Abd distention, recent hypotension    TECHNIQUE: CT abdomen and pelvis with 100 mL Omnipaque 350.    COMPARISON: 6/29/2022    CT ABDOMEN:  Dependent lower lobe reticular alveolar opacities show slight improved aeration right lower lung zone and unchanged left lower lung zone, suggesting consolidation or atelectasis.    Gastrostomy tube tip remains in distal gastric body.    Hyperdensity remains in otherwise unremarkable gallbladder. Liver is normal. No intrahepatic or extrahepatic bile duct dilation.    Pancreas, spleen adrenals, and left kidney are normal right renal hypodensity is too small to characterize but likely.    Minor aortoiliac calcifications are present. Intestines are unremarkable. A normal. No free intraperitoneal gas.    Small fat-containing periumbilical hernia is present.    No acute osseous abnormality.    CT PELVIS:  Hamilton catheter terminates in decompressed bladder. Uterus and adnexa are normal. No free pelvic fluid.    Prominent heterotopic ossification occurs about the left hip. Degenerative narrowing of bilateral hip joints is evident. Muscle atrophy of bilateral gluteal and proximal thigh regions unchanged. Reticular soft tissue density affects medial gluteal soft tissues bilaterally extending deep to the sacrococcygeal junction without associated osseous destruction.    IMPRESSION:    1. Improving right lower lung zone atelectasis and/or consolidation.  2. Unchanged hyperdensity gallbladder suggesting cholelithiasis.  3. Unchanged small fat-containing periumbilical hernia.    Electronically signed by:  Richar Paredes MD  7/6/2023 3:16 PM CDT Workstation: [id number]  X-Ray Chest 1 View  CLINICAL HISTORY:  62 years (1960) Female Hypotension, r/o pna Hypotension    TECHNIQUE:  Portable AP radiograph the  chest.    COMPARISON:  Radiograph from October 20, 2022.    FINDINGS:  Mild scattered interstitial opacities are seen in the midlung zones bilaterally, similar to the previous exam. Costophrenic angles are seen without effusion. No pneumothorax is identified. The heart is top normal in size. There is a tracheostomy tube with tip just above the clavicular heads, 6 in meters from the laith. Osseous structures appear within normal limits. The visualized upper abdomen is unremarkable.    IMPRESSION:  1. Mild scattered nonspecific reticular interstitial lung markings bilaterally, similar to the previous exam suggesting examination of atelectasis, scarring, or possibly mild atypical infection/viral pneumonia, or trace edema in the appropriate setting.  2. Tracheostomy tube with tip projecting just above the clavicular heads.    .    Electronically signed by:  Robert Farrell MD  7/6/2023 10:17 AM CDT Workstation: [id number]        Additional Studies    reviewed    Ventilator Information    Vent Mode: A/C  Oxygen Concentration (%):  [40-50] 50  Resp Rate Total:  [20 br/min-38 br/min] 30 br/min  Vt Set:  [400 mL] 400 mL  PEEP/CPAP:  [5 cmH20-10 cmH20] 8 cmH20  Mean Airway Pressure:  [11 ibB64-58 cmH20] 17 cmH20             No results for input(s): PH, PCO2, PO2, HCO3, POCSATURATED, BE in the last 72 hours.      Impression    Active Hospital Problems    Diagnosis  POA    *Hypothermia [T68.XXXA]  Yes    Low serum cortisol level [R79.89]  Yes    UTI (urinary tract infection) [N39.0]  Yes    Hypokalemia [E87.6]  Yes    Decubitus ulcer of sacral region, stage 3 [L89.153]  Yes    Multiple drug resistant organism (MDRO) culture positive [Z16.24]  Yes    Ventilator dependence [Z99.11]  Not Applicable    PEG (percutaneous endoscopic gastrostomy) status [Z93.1]  Not Applicable    Persistent vegetative state [R40.3]  Yes    Anemia of chronic disease [D63.8]  Yes      Resolved Hospital Problems   No resolved problems to display.        Plan    Continue ventilator at present settings, she is vent dependent and no attempts at weaning will be made  Antibiotics per Dr Chris Madrigal as needed  Watch H/H transfuse for hgb < 7  Cortisol is borderline - may need to check cortrosyn stimulation test  Follow up cultures  Has increased ventilating pressures Ppeak - 38, Pplateau - 18, likely due to smaller trach, respiratory reports some air loss yesterday but better now  No adrenally insufficient by ACTH stimulation test  Will ask surgery to see about placing a larger trach     Thank you for this consult.  I will follow with you while the patient is hospitalized.        Reji Andrews MD  [facility] Pulmonary/Critical Care  07/08/2023

## 2023-07-08 NOTE — PROGRESS NOTES
Pharmacokinetic Initial Assessment: IV Vancomycin    Assessment/Plan:    Initiate intravenous vancomycin with loading dose of 2000 mg once followed by a maintenance dose of vancomycin 1500 mg IV every 12 hours  Desired empiric serum trough concentration is 10 to 20 mcg/mL  Draw vancomycin trough level 60 min prior to fourth dose on 07/09 at approximately 0900  Pharmacy will continue to follow and monitor vancomycin.      Please contact pharmacy at extension [phone] with any questions regarding this assessment.     Thank you for the consult,   Isac Nuñez       Patient brief summary:  Genna Felix is a 62 y.o. female initiated on antimicrobial therapy with IV Vancomycin for treatment of suspected bacteremia    Drug Allergies:   Review of patient's allergies indicates:  No Known Allergies    Actual Body Weight:   88.6 kg    Renal Function:   CrCl cannot be calculated (This lab value cannot be used to calculate CrCl because it is not a number: <0.3).,     CBC (last 72 hours):  Recent Labs   Lab Result Units 07/06/23  1041 07/07/23  0321   WBC K/uL 5.94 5.69   Hemoglobin g/dL 8.3* 7.5*   Hematocrit % 29.1* 25.8*   Platelets K/uL 277 255   Gran % % 48.5  --    Lymph % % 30.3  --    Mono % % 6.9  --    Eosinophil % % 13.8*  --    Basophil % % 0.3  --    Differential Method  Automated  --        Metabolic Panel (last 72 hours):  Recent Labs   Lab Result Units 07/06/23  1041 07/06/23  1058 07/07/23  0321   Sodium mmol/L 136  --  138   Potassium mmol/L 3.5  --  3.2*   Chloride mmol/L 101  --  109   CO2 mmol/L 26  --  26   Glucose mg/dL 93  --  81   Glucose, UA   --  Negative  Negative  --    BUN mg/dL 27*  --  24*   Creatinine mg/dL 0.3*  --  <0.3*   Albumin g/dL 3.3*  --  2.9*   Total Bilirubin mg/dL 0.7  --  0.5   Alkaline Phosphatase U/L 165*  --  143*   AST U/L 29  --  23   ALT U/L 34  --  30       Drug levels (last 3 results):  No results for input(s): VANCOMYCINRA, VANCORANDOM, VANCOMYCINPE, VANCOPEAK, VANCOMYCINTR,  [facility] in the last 72 hours.    Microbiologic Results:  Microbiology Results (last 7 days)       Procedure Component Value Units Date/Time    Blood culture [[id number]]     Order Status: Sent Specimen: Blood     Rapid Organism ID by PCR (from Blood culture) [[id number]] Collected: 07/06/23 0953    Order Status: No result Updated: 07/07/23 2037    Blood culture #1 **CANNOT BE ORDERED STAT** [[id number]] Collected: 07/06/23 0953    Order Status: Completed Specimen: Blood Updated: 07/07/23 2036     Blood Culture, Routine Gram stain aer bottle: Gram positive cocci      Results called to and read back by: Aster Nielsen RN  07/07/2023      20:36 KS3    Culture, Respiratory with Gram Stain [[id number]]  (Abnormal) Collected: 07/06/23 0913    Order Status: Completed Specimen: Respiratory from Sputum Updated: 07/07/23 1608     Respiratory Culture PRESUMPTIVE PSEUDOMONAS SPECIES  Moderate  Identification and susceptibility pending       Gram Stain (Respiratory) Moderate WBC's     Gram Stain (Respiratory) Few Gram positive cocci     Gram Stain (Respiratory) Few Gram positive rods     Gram Stain (Respiratory) Few Gram negative rods    Blood culture #2 **CANNOT BE ORDERED STAT** [[id number]] Collected: 07/06/23 0953    Order Status: Completed Specimen: Blood Updated: 07/07/23 1032     Blood Culture, Routine No Growth to date      No Growth to date    Urine culture [[id number]] Collected: 07/06/23 1058    Order Status: Completed Specimen: Urine Updated: 07/07/23 0806     Urine Culture, Routine No growth to date    Narrative:      Collection Type->Urine, Clean Catch    MRSA Screen by PCR [[id number]] Collected: 07/06/23 2005    Order Status: Completed Specimen: Nasopharyngeal Swab from Nasal Updated: 07/06/23 2146     MRSA SCREEN BY PCR Negative

## 2023-07-09 PROBLEM — T68.XXXA HYPOTHERMIA: Status: RESOLVED | Noted: 2023-07-06 | Resolved: 2023-07-09

## 2023-07-09 PROBLEM — B96.4 URINARY TRACT INFECTION DUE TO PROTEUS: Status: ACTIVE | Noted: 2023-07-07

## 2023-07-09 LAB
ALBUMIN SERPL BCP-MCNC: 2.8 G/DL (ref 3.5–5.2)
ALP SERPL-CCNC: 128 U/L (ref 55–135)
ALT SERPL W/O P-5'-P-CCNC: 27 U/L (ref 10–44)
ANION GAP SERPL CALC-SCNC: 3 MMOL/L (ref 8–16)
AST SERPL-CCNC: 24 U/L (ref 10–40)
BACTERIA BLD CULT: ABNORMAL
BILIRUB SERPL-MCNC: 1 MG/DL (ref 0.1–1)
BUN SERPL-MCNC: 17 MG/DL (ref 6–20)
CALCIUM SERPL-MCNC: 8.7 MG/DL (ref 8.7–10.5)
CHLORIDE SERPL-SCNC: 111 MMOL/L (ref 95–110)
CO2 SERPL-SCNC: 26 MMOL/L (ref 23–29)
CREAT SERPL-MCNC: 0.3 MG/DL (ref 0.5–1.4)
ERYTHROCYTE [DISTWIDTH] IN BLOOD BY AUTOMATED COUNT: 20.4 % (ref 11.5–14.5)
EST. GFR  (NO RACE VARIABLE): >60 ML/MIN/1.73 M^2
GLUCOSE SERPL-MCNC: 95 MG/DL (ref 70–110)
HCT VFR BLD AUTO: 25.2 % (ref 37–48.5)
HGB BLD-MCNC: 7.4 G/DL (ref 12–16)
MCH RBC QN AUTO: 26.4 PG (ref 27–31)
MCHC RBC AUTO-ENTMCNC: 29.4 G/DL (ref 32–36)
MCV RBC AUTO: 90 FL (ref 82–98)
PLATELET # BLD AUTO: 244 K/UL (ref 150–450)
PMV BLD AUTO: 11.4 FL (ref 9.2–12.9)
POTASSIUM SERPL-SCNC: 3.6 MMOL/L (ref 3.5–5.1)
PROT SERPL-MCNC: 8 G/DL (ref 6–8.4)
RBC # BLD AUTO: 2.8 M/UL (ref 4–5.4)
SODIUM SERPL-SCNC: 140 MMOL/L (ref 136–145)
WBC # BLD AUTO: 5.68 K/UL (ref 3.9–12.7)

## 2023-07-09 PROCEDURE — 99233 PR SUBSEQUENT HOSPITAL CARE,LEVL III: ICD-10-PCS | Mod: ,,, | Performed by: INTERNAL MEDICINE

## 2023-07-09 PROCEDURE — 20000000 HC ICU ROOM

## 2023-07-09 PROCEDURE — 99233 SBSQ HOSP IP/OBS HIGH 50: CPT | Mod: ,,, | Performed by: INTERNAL MEDICINE

## 2023-07-09 PROCEDURE — 25000003 PHARM REV CODE 250: Performed by: INTERNAL MEDICINE

## 2023-07-09 PROCEDURE — 94640 AIRWAY INHALATION TREATMENT: CPT

## 2023-07-09 PROCEDURE — 99900026 HC AIRWAY MAINTENANCE (STAT)

## 2023-07-09 PROCEDURE — 94761 N-INVAS EAR/PLS OXIMETRY MLT: CPT

## 2023-07-09 PROCEDURE — 25000242 PHARM REV CODE 250 ALT 637 W/ HCPCS: Performed by: INTERNAL MEDICINE

## 2023-07-09 PROCEDURE — 99900035 HC TECH TIME PER 15 MIN (STAT)

## 2023-07-09 PROCEDURE — 27100171 HC OXYGEN HIGH FLOW UP TO 24 HOURS

## 2023-07-09 PROCEDURE — 51702 INSERT TEMP BLADDER CATH: CPT

## 2023-07-09 PROCEDURE — 80053 COMPREHEN METABOLIC PANEL: CPT | Performed by: INTERNAL MEDICINE

## 2023-07-09 PROCEDURE — 85027 COMPLETE CBC AUTOMATED: CPT | Performed by: INTERNAL MEDICINE

## 2023-07-09 PROCEDURE — 63600175 PHARM REV CODE 636 W HCPCS: Performed by: INTERNAL MEDICINE

## 2023-07-09 PROCEDURE — 94003 VENT MGMT INPAT SUBQ DAY: CPT

## 2023-07-09 RX ADMIN — POTASSIUM BICARBONATE 50 MEQ: 977.5 TABLET, EFFERVESCENT ORAL at 05:07

## 2023-07-09 RX ADMIN — LEVALBUTEROL HYDROCHLORIDE 1.25 MG: 1.25 SOLUTION RESPIRATORY (INHALATION) at 03:07

## 2023-07-09 RX ADMIN — MICONAZOLE NITRATE: 20 CREAM TOPICAL at 08:07

## 2023-07-09 RX ADMIN — GENTAMICIN SULFATE 1 DROP: 3 SOLUTION OPHTHALMIC at 02:07

## 2023-07-09 RX ADMIN — LEVALBUTEROL HYDROCHLORIDE 1.25 MG: 1.25 SOLUTION RESPIRATORY (INHALATION) at 08:07

## 2023-07-09 RX ADMIN — ENOXAPARIN SODIUM 40 MG: 100 INJECTION SUBCUTANEOUS at 04:07

## 2023-07-09 RX ADMIN — GENTAMICIN SULFATE 1 DROP: 3 SOLUTION OPHTHALMIC at 04:07

## 2023-07-09 RX ADMIN — SODIUM CHLORIDE: 0.9 INJECTION, SOLUTION INTRAVENOUS at 03:07

## 2023-07-09 RX ADMIN — MEROPENEM 1 G: 1 INJECTION, POWDER, FOR SOLUTION INTRAVENOUS at 01:07

## 2023-07-09 RX ADMIN — MUPIROCIN 1 G: 20 OINTMENT TOPICAL at 08:07

## 2023-07-09 RX ADMIN — MIDODRINE HYDROCHLORIDE 10 MG: 2.5 TABLET ORAL at 08:07

## 2023-07-09 RX ADMIN — MICONAZOLE NITRATE: 20 CREAM TOPICAL at 09:07

## 2023-07-09 RX ADMIN — MUPIROCIN 1 G: 20 OINTMENT TOPICAL at 09:07

## 2023-07-09 RX ADMIN — MEROPENEM 1 G: 1 INJECTION, POWDER, FOR SOLUTION INTRAVENOUS at 04:07

## 2023-07-09 RX ADMIN — GENTAMICIN SULFATE 1 DROP: 3 SOLUTION OPHTHALMIC at 09:07

## 2023-07-09 RX ADMIN — IPRATROPIUM BROMIDE 0.5 MG: 0.5 SOLUTION RESPIRATORY (INHALATION) at 03:07

## 2023-07-09 RX ADMIN — MIDODRINE HYDROCHLORIDE 10 MG: 2.5 TABLET ORAL at 09:07

## 2023-07-09 RX ADMIN — MEROPENEM 1 G: 1 INJECTION, POWDER, FOR SOLUTION INTRAVENOUS at 08:07

## 2023-07-09 RX ADMIN — MIDODRINE HYDROCHLORIDE 10 MG: 2.5 TABLET ORAL at 03:07

## 2023-07-09 RX ADMIN — GENTAMICIN SULFATE 1 DROP: 3 SOLUTION OPHTHALMIC at 08:07

## 2023-07-09 RX ADMIN — IPRATROPIUM BROMIDE 0.5 MG: 0.5 SOLUTION RESPIRATORY (INHALATION) at 08:07

## 2023-07-09 NOTE — PROGRESS NOTES
[facility] Medicine  Progress Note    Patient Name: eGnna Felix  MRN: [id number]  Patient Class: IP- Inpatient   Admission Date: 7/6/2023  Length of Stay: 2 days  Attending Physician: Lanre Goss MD  Primary Care Provider: Primary Doctor No        Subjective:     Principal Problem:Hypothermia        HPI:  62 year old nursing home pt again getting admitted with hypotension/bradycardia and hypothermia  Pt has h/o anoxic brain injury status post tracheostomy/peg tube  Also  history of multidrug resistant organisms / and has an  indwelling cholecystostomy tube   Per NH records she is not on any abx  Because of unstable vital signs she was send to ER and got admitted        Overview/Hospital Course:  No notes on file    Interval History:  temperature is better.  Blood culture has a GPC.  Urine output is better.    Review of Systems   Unable to perform ROS: Patient unresponsive   Objective:     Vital Signs (Most Recent):  Temp: 96.3 °F (35.7 °C) (07/08/23 1501)  Pulse: 63 (07/08/23 1701)  Resp: 20 (07/08/23 1701)  BP: 128/60 (07/08/23 1701)  SpO2: (!) 94 % (07/08/23 1701) Vital Signs (24h Range):  Temp:  [96.3 °F (35.7 °C)-97.8 °F (36.6 °C)] 96.3 °F (35.7 °C)  Pulse:  [58-95] 63  Resp:  [9-38] 20  SpO2:  [90 %-97 %] 94 %  BP: ()/(49-84) 128/60     Weight: 88.6 kg (195 lb 5.2 oz)  Body mass index is 38.15 kg/m².    Intake/Output Summary (Last 24 hours) at 7/8/2023 2046  Last data filed at 7/8/2023 1756  Gross per 24 hour   Intake 4386.33 ml   Output 1450 ml   Net 2936.33 ml           Physical Exam  Constitutional:       Interventions: She is intubated.   Eyes:      Conjunctiva/sclera:      Right eye: Right conjunctiva is not injected. No exudate.     Left eye: Left conjunctiva is not injected. No exudate.  Neck:      Vascular: No JVD.      Trachea: Tracheostomy present.   Cardiovascular:      Rate and Rhythm: Normal rate and regular rhythm.   Pulmonary:      Effort: She is intubated.       Breath sounds: Rhonchi present.      Comments: Trach connected to ventilator  Abdominal:      General: Bowel sounds are decreased. There is distension.      Palpations: Abdomen is soft.   Musculoskeletal:      Right lower leg: Edema present.      Left lower leg: Edema present.   Skin:     Comments: Stage 3 decubitus on sacrum   Neurological:      Comments: Eyes open.  Doesn't respond.           Significant Labs: All pertinent labs within the past 24 hours have been reviewed.    Significant Imaging:  No new imaging      Assessment/Plan:      * Hypothermia  Improved.  Doesn't need Beth hugger as much.  Monitor temperature.    Decubitus ulcer of sacral region, stage 3  Consult with wound care service.  Ulcer was present prior to current admission.      Hypokalemia  Supplement potassium and monitor level.    Potassium   Date Value Ref Range Status   07/08/2023 3.6 3.5 - 5.1 mmol/L Final           UTI (urinary tract infection)  Continue IVAB.  She's on meropenem.  Review of past cultures done.   She's at high risk for MDRO's.      Low serum cortisol level  Cortisol level low, despite her being sick.   Cosyntropin stimulation test results were normal.  Unlikely to have adrenal insufficiency.    Multiple drug resistant organism (MDRO) culture positive  Aware of this.  In the past      Ventilator dependence  Chronic issue.  Pulmonologist assisting with management.      PEG (percutaneous endoscopic gastrostomy) status  Aware  Continue tube feeding.    Tracheostomy dependence  There's an air leak.  Her trach ws downsized at the nursing facility.  Pulmonologist and respiratory therapy are following.      Persistent vegetative state  Aware   From anoxic brain damage      Anemia of chronic disease  Patient's anemia is currently controlled. Has not received any PRBCs to date.. Etiology likely d/t chronic disease.  Current CBC reviewed-   Lab Results   Component Value Date    HGB 7.0 (L) 07/08/2023    HCT 24.2 (L) 07/08/2023      Monitor serial CBC and transfuse if patient becomes hemodynamically unstable, symptomatic or H/H drops below 7/21.           VTE Risk Mitigation (From admission, onward)         Ordered     enoxaparin injection 40 mg  Daily         07/06/23 1645     IP VTE HIGH RISK PATIENT  Once         07/06/23 1554     Place sequential compression device  Until discontinued         07/06/23 1554                Discharge Planning   [place]:      Code Status: DNR   Is the patient medically ready for discharge?:     Reason for patient still in hospital (select all that apply): Patient trending condition, Laboratory test and Treatment  Discharge Plan A: Return to nursing home                  Lanre Goss MD  Department of Hospital Medicine   [facility]

## 2023-07-09 NOTE — ASSESSMENT & PLAN NOTE
Supplement potassium and monitor level.    Potassium   Date Value Ref Range Status   07/08/2023 3.6 3.5 - 5.1 mmol/L Final

## 2023-07-09 NOTE — ASSESSMENT & PLAN NOTE
Cortisol level low, despite her being sick.   Cosyntropin stimulation test results were normal.  Unlikely to have adrenal insufficiency.

## 2023-07-09 NOTE — PLAN OF CARE
Tube feed at goal, carroll catheter changed out as this was never done in the ED, ishan martínez had to be put back on around 1200 for temp of 95, turned q 2, wound care complete.   Problem: Infection  Goal: Absence of Infection Signs and Symptoms  Outcome: Ongoing, Progressing     Problem: Adult Inpatient Plan of Care  Goal: Plan of Care Review  Outcome: Ongoing, Progressing  Goal: Patient-Specific Goal (Individualized)  Outcome: Ongoing, Progressing  Goal: Absence of Hospital-Acquired Illness or Injury  Outcome: Ongoing, Progressing  Goal: Optimal Comfort and Wellbeing  Outcome: Ongoing, Progressing  Goal: Readiness for Transition of Care  Outcome: Ongoing, Progressing     Problem: Skin Injury Risk Increased  Goal: Skin Health and Integrity  Outcome: Ongoing, Progressing     Problem: Communication Impairment (Mechanical Ventilation, Invasive)  Goal: Effective Communication  Outcome: Ongoing, Progressing     Problem: Device-Related Complication Risk (Mechanical Ventilation, Invasive)  Goal: Optimal Device Function  Outcome: Ongoing, Progressing     Problem: Inability to Wean (Mechanical Ventilation, Invasive)  Goal: Mechanical Ventilation Liberation  Outcome: Ongoing, Progressing     Problem: Nutrition Impairment (Mechanical Ventilation, Invasive)  Goal: Optimal Nutrition Delivery  Outcome: Ongoing, Progressing     Problem: Skin and Tissue Injury (Mechanical Ventilation, Invasive)  Goal: Absence of Device-Related Skin and Tissue Injury  Outcome: Ongoing, Progressing     Problem: Ventilator-Induced Lung Injury (Mechanical Ventilation, Invasive)  Goal: Absence of Ventilator-Induced Lung Injury  Outcome: Ongoing, Progressing     Problem: Communication Impairment (Artificial Airway)  Goal: Effective Communication  Outcome: Ongoing, Progressing     Problem: Device-Related Complication Risk (Artificial Airway)  Goal: Optimal Device Function  Outcome: Ongoing, Progressing     Problem: Skin and Tissue Injury (Artificial  Airway)  Goal: Absence of Device-Related Skin or Tissue Injury  Outcome: Ongoing, Progressing     Problem: Noninvasive Ventilation Acute  Goal: Effective Unassisted Ventilation and Oxygenation  Outcome: Ongoing, Progressing     Problem: Aspiration (Enteral Nutrition)  Goal: Absence of Aspiration Signs and Symptoms  Outcome: Ongoing, Progressing     Problem: Device-Related Complication Risk (Enteral Nutrition)  Goal: Safe, Effective Therapy Delivery  Outcome: Ongoing, Progressing     Problem: Feeding Intolerance (Enteral Nutrition)  Goal: Feeding Tolerance  Outcome: Ongoing, Progressing     Problem: Impaired Wound Healing  Goal: Optimal Wound Healing  Outcome: Ongoing, Progressing

## 2023-07-09 NOTE — PROGRESS NOTES
Pulmonary/Critical Care  Progress Note      Patient name: Genna Felix  MRN: [id number]  Date: 07/09/2023    Admit Date: 7/6/2023  Consult Requested By: Lanre Goss MD    Reason for Consult: respiratory failure, chronic ventilator    HPI:    7/7/2023 - 63 yo chronic vent sent to ER for hypothermia and bradycardia, she is unresponsive and not able to provide any history.  Chart has been reviewed.  She has been seen by Dr Perez    7/8/2023 - By report her tach was downsized from an * to 6 at NH.  We now have positional issues and cuff leak and loss of TV at times.  We do not know the timing of the change.  Ppeak is elevated but Pplateau is OK.  She remains unresponsive to me.  She is tachycardic and seems uncomfortable.  H/H has decreased.  SC + serratia, UC + proteus.    7/9/2023 - Had issues last night with leak, respiratory was able to change trach to #8.  Airway pressures are better and she is in no distress.  Dr Perez's note reviewed.  Remains hypothermic.  Multiple + cultures but initial procalcitonin is low.  CXR reviewed    Review of Systems    Review of Systems   Unable to perform ROS: Mental acuity     Past Medical History    Past Medical History:   Diagnosis Date    Anoxic brain damage 07/2020    Bedbound 07/2020    Cardiac arrest 07/2020    Cirrhosis     GERD (gastroesophageal reflux disease)     Hemangioma of intra-abdominal structure     Hypertension     Nursing home resident     Protein calorie malnutrition     Pulmonary embolus     Respiratory distress     S/P percutaneous endoscopic gastrostomy (PEG) tube placement 07/2020    Total self-care deficit 07/2020    Tracheostomy dependence     7/2020       Past Surgical History    Past Surgical History:   Procedure Laterality Date    PEG W/TRACHEOSTOMY PLACEMENT  07/27/2020    SKIN GRAFT      buttocks       Medications (scheduled):      enoxparin  40 mg Subcutaneous Daily    gentamicin  1 drop Both Eyes QID    levalbuterol  1.25 mg Nebulization Q8H     And    ipratropium  0.5 mg Nebulization Q8H    meropenem (MERREM) IVPB  1 g Intravenous Q8H    miconazole   Topical (Top) BID    midodrine  10 mg Per G Tube TID    mupirocin   Nasal BID       Medications (infusions):      sodium chloride 0.9% 100 mL/hr at 07/09/23 0312       Medications (prn):     acetaminophen, magnesium oxide, magnesium oxide, morphine, potassium bicarbonate, potassium bicarbonate, potassium bicarbonate, potassium, sodium phosphates    Family History:   Family History   Problem Relation Age of Onset    Hypertension Mother     No Known Problems Father        Social History: Tobacco:   Social History     Tobacco Use   Smoking Status Never   Smokeless Tobacco Never                                EtOH:   Social History     Substance and Sexual Activity   Alcohol Use Not Currently                                Drugs:   Social History     Substance and Sexual Activity   Drug Use Not Currently     Physical Exam    Vital signs:  Temp:  [95 °F (35 °C)-97.3 °F (36.3 °C)]   Pulse:  [49-81]   Resp:  [10-38]   BP: ()/(54-78)   SpO2:  [92 %-98 %]     Intake/Output:   Intake/Output Summary (Last 24 hours) at 7/9/2023 1258  Last data filed at 7/9/2023 1129  Gross per 24 hour   Intake 3124 ml   Output 2075 ml   Net 1049 ml          BMI: Estimated body mass index is 38.15 kg/m² as calculated from the following:    Height as of this encounter: 5' (1.524 m).    Weight as of this encounter: 88.6 kg (195 lb 5.2 oz).    Physical Exam  Vitals and nursing note reviewed.   Constitutional:       General: She is not in acute distress.     Appearance: She is not ill-appearing, toxic-appearing or diaphoretic.      Comments: Chronically ill  Vent dependent  Eyes open but does not respond to me   HENT:      Head: Normocephalic and atraumatic.      Right Ear: External ear normal.      Left Ear: External ear normal.      Nose: Nose normal. No congestion or rhinorrhea.      Mouth/Throat:      Mouth: Mucous membranes are  moist.      Pharynx: Oropharynx is clear. No oropharyngeal exudate or posterior oropharyngeal erythema.   Eyes:      General: No scleral icterus.        Right eye: No discharge.         Left eye: No discharge.      Extraocular Movements: Extraocular movements intact.      Conjunctiva/sclera: Conjunctivae normal.      Pupils: Pupils are equal, round, and reactive to light.   Neck:      Vascular: No carotid bruit.      Comments: Trach in place   Cardiovascular:      Rate and Rhythm: Normal rate and regular rhythm.      Pulses: Normal pulses.      Heart sounds: No murmur heard.    No friction rub. No gallop.   Pulmonary:      Effort: Pulmonary effort is normal. No respiratory distress.      Breath sounds: No stridor. Rhonchi present. No wheezing or rales.      Comments: Ventlated   PEG  Chest:      Chest wall: No tenderness.   Abdominal:      General: There is distension.      Tenderness: There is no abdominal tenderness. There is no guarding.      Comments: PEG  BS hypoactive   Musculoskeletal:         General: No swelling. Normal range of motion.      Cervical back: Normal range of motion and neck supple. No rigidity or tenderness.      Right lower leg: Edema present.      Left lower leg: Edema present.      Comments: Chronic edema   Lymphadenopathy:      Cervical: No cervical adenopathy.   Skin:     General: Skin is warm and dry.      Coloration: Skin is not jaundiced.      Findings: No bruising.      Comments: + sacral wound and hand wound (POA)   Neurological:      Comments: At baseline   Psychiatric:      Comments: Not able to assess  Calm        Laboratory    Recent Labs   Lab 07/09/23  0355   WBC 5.68   RBC 2.80*   HGB 7.4*   HCT 25.2*      MCV 90   MCH 26.4*   MCHC 29.4*         Recent Labs   Lab 07/09/23  0355   CALCIUM 8.7   PROT 8.0      K 3.6   CO2 26   *   BUN 17   CREATININE 0.3*   ALKPHOS 128   ALT 27   AST 24   BILITOT 1.0         No results for input(s): PT, INR, APTT in the last 24  hours.    No results for input(s): CPK, CPKMB, TROPONINI, MB in the last 24 hours.    Additional labs: reviewed    Microbiology:       Microbiology Results (last 7 days)       Procedure Component Value Units Date/Time    Urine culture [[id number]]  (Abnormal)  (Susceptibility) Collected: 07/06/23 1058    Order Status: Completed Specimen: Urine Updated: 07/09/23 1258     Urine Culture, Routine PROTEUS MIRABILIS ESBL  >100,000 cfu/ml        PROTEUS MIRABILIS    Narrative:      Collection Type->Urine, Clean Catch    Blood culture #2 **CANNOT BE ORDERED STAT** [[id number]] Collected: 07/06/23 0953    Order Status: Completed Specimen: Blood Updated: 07/09/23 1032     Blood Culture, Routine No Growth to date      No Growth to date      No Growth to date      No Growth to date    Blood culture #1 **CANNOT BE ORDERED STAT** [[id number]]  (Abnormal) Collected: 07/06/23 0953    Order Status: Completed Specimen: Blood Updated: 07/09/23 0703     Blood Culture, Routine Gram stain aer bottle: Gram positive cocci      Results called to and read back by: Aster Nielsen RN  07/07/2023      20:36 KS3      COAGULASE-NEGATIVE STAPHYLOCOCCUS SPECIES  Organism is a probable contaminant      Blood culture [[id number]] Collected: 07/07/23 2141    Order Status: Completed Specimen: Blood Updated: 07/09/23 0232     Blood Culture, Routine No Growth to date      No Growth to date    Culture, Respiratory with Gram Stain [[id number]]  (Abnormal)  (Susceptibility) Collected: 07/06/23 0913    Order Status: Completed Specimen: Respiratory from Sputum Updated: 07/08/23 0644     Respiratory Culture SERRATIA MARCESCENS  Moderate       Gram Stain (Respiratory) Moderate WBC's     Gram Stain (Respiratory) Few Gram positive cocci     Gram Stain (Respiratory) Few Gram positive rods     Gram Stain (Respiratory) Few Gram negative rods    Rapid Organism ID by PCR (from Blood culture) [[id number]]  (Abnormal) Collected: 07/06/23 0953    Order Status: Completed  Updated: 07/07/23 2153     Enterococcus faecalis Not Detected     Enterococcus faecium Not Detected     Listeria monocytogenes Not Detected     Staphylococcus spp. Detected     Staphylococcus aureus Not Detected     Staphylococcus epidermidis Not Detected     Staphylococcus lugdunensis Not Detected     Streptococcus species Not Detected     Streptococcus agalactiae Not Detected     Streptococcus pneumoniae Not Detected     Streptococcus pyogenes Not Detected     Acinetobacter calcoaceticus/baumannii complex Not Detected     Bacteroides fragilis Not Detected     Enterobacterales Not Detected     Enterobacter cloacae complex Not Detected     Escherichia coli Not Detected     Klebsiella aerogenes Not Detected     Klebsiella oxytoca Not Detected     Klebsiella pneumoniae group Not Detected     Proteus Not Detected     Salmonella sp Not Detected     Serratia marcescens Not Detected     Haemophilus influenzae Not Detected     Neisseria meningtidis Not Detected     Pseudomonas aeruginosa Not Detected     Stenotrophomonas maltophilia Not Detected     Candida albicans Not Detected     Candida auris Not Detected     Candida glabrata Not Detected     Candida krusei Not Detected     Candida parapsilosis Not Detected     Candida tropicalis Not Detected     Cryptococcus neoformans/gattii Not Detected     CTX-M (ESBL ) Test not applicable     IMP (Carbapenem resistant) Test not applicable     KPC resistance gene (Carbapenem resistant) Test not applicable     mcr-1  Test not applicable     mec A/C  Test not applicable     mec A/C and MREJ (MRSA) gene Test not applicable     NDM (Carbapenem resistant) Test not applicable     OXA-48-like (Carbapenem resistant) Test not applicable     van A/B (VRE gene) Test not applicable     VIM (Carbapenem resistant) Test not applicable    MRSA Screen by PCR [[id number]] Collected: 07/06/23 2005    Order Status: Completed Specimen: Nasopharyngeal Swab from Nasal Updated: 07/06/23 3387      MRSA SCREEN BY PCR Negative            Radiology    X-Ray Chest AP Portable  HISTORY: Trach exchange    COMPARISON:7/6/2023    FINDINGS:Tracheostomy tube exchange noted with the end point of the tracheostomy tube terminating at the medial aspect proximal position. Scattered initial opacities are identified restricted towards the mid to lower lung zones unchanged since previous. Both costophrenic sulci are clear without evidence of effusion. Osseous structures reveal no significant finding. Heart normal in size.    IMPRESSION:  1. Tracheostomy tube exchange with optimal positioning.  2. Diffuse interstitial opacities are attributed towards hilar atelectasis, scarring, or atypical infection as previous.    Electronically signed by:  Robert Hannon MD  7/9/2023 11:03 AM CDT Workstation: [id number]        Additional Studies    reviewed    Ventilator Information    Vent Mode: A/C  Oxygen Concentration (%):  [50] 50  Resp Rate Total:  [20 br/min-33 br/min] 26 br/min  Vt Set:  [400 mL] 400 mL  PEEP/CPAP:  [8 cmH20] 8 cmH20  Mean Airway Pressure:  [12 hbM81-19 cmH20] 16 cmH20             No results for input(s): PH, PCO2, PO2, HCO3, POCSATURATED, BE in the last 72 hours.      Impression    Active Hospital Problems    Diagnosis  POA    *Hypothermia [T68.XXXA]  Yes    Low serum cortisol level [R79.89]  Yes    UTI (urinary tract infection) [N39.0]  Yes    Hypokalemia [E87.6]  Yes    Decubitus ulcer of sacral region, stage 3 [L89.153]  Yes    Multiple drug resistant organism (MDRO) culture positive [Z16.24]  Yes    Ventilator dependence [Z99.11]  Not Applicable    PEG (percutaneous endoscopic gastrostomy) status [Z93.1]  Not Applicable    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]  Not Applicable    Persistent vegetative state [R40.3]  Yes    Anemia of chronic disease [D63.8]  Yes      Resolved Hospital Problems   No resolved problems to display.       Plan    Continue ventilator at present settings, she is vent dependent and no attempts  at weaning will be made  Antibiotics per Dr Chris Madrigal as needed  Watch H/H transfuse for hgb < 7  Follow up cultures  Ventilating pressures are better with larger trach  Not adrenally insufficient by ACTH stimulation test  ? If she could be transferred back to NH to complete antibiotics      Thank you for this consult.  I will follow with you while the patient is hospitalized.        Reji Andrews MD  [facility] Pulmonary/Critical Care  07/09/2023

## 2023-07-09 NOTE — CARE UPDATE
07/08/23 2045   Patient Assessment/Suction   Level of Consciousness (AVPU) responds to pain   Respiratory Effort Normal;Unlabored   Expansion/Accessory Muscles/Retractions no use of accessory muscles   All Lung Fields Breath Sounds equal bilaterally;coarse   Rhythm/Pattern, Respiratory assisted mechanically   Cough Frequency with stimulation   Cough Type assisted   Suction Method tracheal   $ Suction Charges Inline Suction Procedure Stat Charge   Secretions Amount moderate   Secretions Color cloudy;white   Secretions Characteristics thick   Skin Integrity   $ Wound Care Tech Time 15 min   Area Observed Neck under tracheostomy   Skin Appearance without discoloration   Barrier used? Other (see comments)   Barrier Changed? Yes   PRE-TX-O2   Device (Oxygen Therapy) ventilator   Oxygen Concentration (%) 50   SpO2 97 %   Pulse Oximetry Type Continuous   $ Pulse Oximetry - Multiple Charge Pulse Oximetry - Multiple   Pulse 69   Resp (!) 30   Adult Surgical Airway Shiley Cuffed 6.0/ 75mm   No placement date or time found.   Present Prior to Hospital Arrival?: Yes  Type: Tracheostomy  Brand: Shiley  Airway Device Style: Cuffed  Airway Device Size: 6.0/ 75mm   Cuff Pressure   (MLT)   Cuff Inflation? Inflated   Status Secured   Site Assessment Clean;Dry;Air leak   Site Care Cleansed;Dried;Dressing applied   Inner Cannula Care Changed/new   Ties Assessment Clean;Dry;Intact;Secure   Airway Safety   Is Ambu Bag and Mask with Patient? Yes, Adult Ambu Bag and Mask   Suction set is at the bedside? Yes   Extra trach at bedside? Yes   Extra trach sizes at bedside? 8   Is Obturator Available? Yes   Location of Obturator?  Bedside table   Vent Select   Conventional Vent Y   Charged w/in last 24h YES   Preset Conventional Ventilator Settings   Vent ID [id number]   Vent Type    Ventilation Type VC   Vent Mode A/C   Humidity HME   Set Rate 20 BPM   Vt Set 400 mL   PEEP/CPAP 8 cmH20   Peak Flow 65 L/min   Peak End Inspiratory Pressure 38  cmH20   I-Trigger Type  V-TRIG   Trigger Sensitivity Flow/I-Trigger 3 L/min   Patient Ventilator Parameters   Resp Rate Total 30 br/min   Peak Airway Pressure 38 cmH20   Mean Airway Pressure 18 cmH20   Plateau Pressure 28 cmH20   Exhaled Vt 414 mL   Total Ve 12.5 L/m   I:E Ratio Measured 1:1.90   Auto PEEP 0 cmH20   Tubing ID (mm) 6 mm   Tube Type Trach   Conventional Ventilator Alarms   Alarms On Y   Resp Rate High Alarm 45 br/min   Press High Alarm 50 cmH2O   Apnea Rate 10   Apnea Volume (mL) 0 mL   Apnea Oxygen Concentration  100   Apnea Flow Rate (L/min) 63   T Apnea 20 sec(s)   Ready to Wean/Extubation Screen   FIO2<=50 (chart decimal) 0.5   MV<16L (chart vol.) 12.5   PEEP <=8 (chart #) 8   Ready to Wean Parameters   F/VT Ratio<105 (RSBI) (!) 72.46   Vital Capacity   Vital Capacity (mL) 0   Education   $ Education Bronchodilator;Suction;Trach Care;Ventilator Oxygen;15 min   Respiratory Evaluation   $ Care Plan Tech Time 15 min   $ Eval/Re-eval Charges Evaluation   Evaluation For New Orders

## 2023-07-09 NOTE — ASSESSMENT & PLAN NOTE
Continue IVAB.  She's on meropenem.  Review of past cultures done.   She's at high risk for MDRO's.

## 2023-07-09 NOTE — ASSESSMENT & PLAN NOTE
There's an air leak.  Her trach ws downsized at the nursing facility.  Pulmonologist and respiratory therapy are following.

## 2023-07-09 NOTE — SUBJECTIVE & OBJECTIVE
Interval History:  temperature is better.  Blood culture has a GPC.  Urine output is better.    Review of Systems   Unable to perform ROS: Patient unresponsive   Objective:     Vital Signs (Most Recent):  Temp: 96.3 °F (35.7 °C) (07/08/23 1501)  Pulse: 63 (07/08/23 1701)  Resp: 20 (07/08/23 1701)  BP: 128/60 (07/08/23 1701)  SpO2: (!) 94 % (07/08/23 1701) Vital Signs (24h Range):  Temp:  [96.3 °F (35.7 °C)-97.8 °F (36.6 °C)] 96.3 °F (35.7 °C)  Pulse:  [58-95] 63  Resp:  [9-38] 20  SpO2:  [90 %-97 %] 94 %  BP: ()/(49-84) 128/60     Weight: 88.6 kg (195 lb 5.2 oz)  Body mass index is 38.15 kg/m².    Intake/Output Summary (Last 24 hours) at 7/8/2023 2046  Last data filed at 7/8/2023 1756  Gross per 24 hour   Intake 4386.33 ml   Output 1450 ml   Net 2936.33 ml           Physical Exam  Constitutional:       Interventions: She is intubated.   Eyes:      Conjunctiva/sclera:      Right eye: Right conjunctiva is not injected. No exudate.     Left eye: Left conjunctiva is not injected. No exudate.  Neck:      Vascular: No JVD.      Trachea: Tracheostomy present.   Cardiovascular:      Rate and Rhythm: Normal rate and regular rhythm.   Pulmonary:      Effort: She is intubated.      Breath sounds: Rhonchi present.      Comments: Trach connected to ventilator  Abdominal:      General: Bowel sounds are decreased. There is distension.      Palpations: Abdomen is soft.   Musculoskeletal:      Right lower leg: Edema present.      Left lower leg: Edema present.   Skin:     Comments: Stage 3 decubitus on sacrum   Neurological:      Comments: Eyes open.  Doesn't respond.           Significant Labs: All pertinent labs within the past 24 hours have been reviewed.    Significant Imaging:  No new imaging

## 2023-07-09 NOTE — CARE UPDATE
Trach changed out to a #8.0 Shiley due to excessive air leak. Placement verified with CXR, return Vt > 400ml and =BBS. Tolerated well.

## 2023-07-09 NOTE — PROGRESS NOTES
Consult Note  Infectious Disease    Reason for Consult:  Antibiotic recommendations    HPI: Genna Felix is a 62 y.o. female  resident of [facility], with past medical history of anoxic brain injury status post tracheostomy/peg tube, prior PE, hypertension, GERD and history of multidrug resistant organisms  , most recently seen by our service in early August of 2022 for septic shock secondary to pneumonia and by [facility] main ID late August for right arm cellulitis.  She was seen in our emergency room June 12 for placement of a fallen out PEG tube.  She was sent to the emergency room this morning with bradycardia to the 40s, hypotension to the 80s, f hypothermic ound to have scattered rhonchi, markedly distended abdomen, normal white blood cell count, urinalysis with 95 white blood cells.  CT scan of the abdomen was performed showed improvement compared to ?  August 2022 CT of the right lower lung zone atelectasis and/or consolidation, cholelithiasis periumbilical hernia.  Chest x-ray shows slight increase in fibrotic background.  Review of NH MAR does not include any antibiotics    7/7: Interim reviewed. Temps are improved, under Beth Hugger cortisol yesterday was 11.8 at 6 pm but a low 3.3 at 0400 today.  Her urine output is very poor despite another 1100 cc of fluid overnight.  The urine quality is also still purulent.  Blood pressure is a little low today, was hypertensive in the ER  7/8: Interim reviewed.  Temperatures are more normal and the Beth Hugger was only on low.  Hemodynamically stable.  Cultures and stimulation test is adequate at 9.7 to 18-19 at 30 and 60 minutes.  Blood cultures had 1 bottle with a coagulase-negative staph turn positive overnight, temporarily given vancomycin.  Urine culture has 2 g negative rods to be identified, 1 of which is a presumptive Proteus.  Urine output remains poor, white blood cells normal, hemoglobin down to 7.0, creatinine 0.4, albumin 2.7.  No new  imaging  7/9: Interim reviewed.  Her temperature is improved, white blood cells remain normal, hemodynamically stable.  Issues with her tracheostomy and cuff leak are noted.  Chest x-ray from yesterday looks a bit wet.  She has required fair amount of fluid to promote urine output even though she has a tremendous amount of firm edema/anasarca throughout. Albumin 2.7.   History of normal EF 03/2022.  Urine culture had 2 different Proteus species, 1 of which was ESBL positive.      EXAM & DIAGNOSTICS REVIEWED:   Vitals:     Temp:  [95.6 °F (35.3 °C)-97.3 °F (36.3 °C)]   Temp: 97.2 °F (36.2 °C) (07/09/23 0701)  Pulse: 75 (07/09/23 0701)  Resp: (!) 38 (07/09/23 0701)  BP: 113/75 (07/09/23 0701)  SpO2: 98 % (07/09/23 0701)    Intake/Output Summary (Last 24 hours) at 7/9/2023 0820  Last data filed at 7/9/2023 0525  Gross per 24 hour   Intake 3124 ml   Output 1700 ml   Net 1424 ml       General:  In NAD. Eyes open, does not attend, looks more comfortable.  Eyes:  Anicteric, PERRL, EOM cannot be tested. Sclera are less  injected(started eye drops yesterday), left greater than right    ENT:  Unable to examine due to high tone of jaw  Neck:  supple, tracheostomy to vent  Lungs: Clear, no consolidation, rales, wheezes, rub, secretions minimal. Grunting respirations when disturbed, less today  Heart:  RRR, no gallop/murmur/rub noted  Abd:  Distended, hypoactive BS,no grimace with palpation.  no masses or organomegaly appreciated. PEG without redness, tolerating tube feeding  :   Carroll, urine improved   output, no hematuria, leaking around carroll  Musc:  Joints without effusion, swelling, erythema, synovitis, with high tone and flexion contractures  Skin:  Tinea pedis left foot, scaly skin on plantar surfaces  Neuro:           Arousable, does not attend, non verbal, occasionally grunting. Flexion contractures of hands and arms.  Psych:   Unable to assess  Lymphatic:        Extrem: Chronic firm edema,without erythema, phlebitis,  cellulitis, warm and well perfused  VAD:  peripherals Left arm midline 7/7    Isolation:  contact  Wound:   Right dorsal hand with erosion, ?from prior IV, non infected, improved  Left side of sacrum, stage 3, non infected       Cortisol ug/dL 18.9  17.9 CM  9.7        General Labs reviewed:  Recent Labs   Lab 07/07/23  0321 07/08/23  0310 07/09/23  0355   WBC 5.69 5.49 5.68   HGB 7.5* 7.0* 7.4*   HCT 25.8* 24.2* 25.2*    259 244       Recent Labs   Lab 07/07/23  0321 07/08/23  0310 07/09/23  0355    138 140   K 3.2* 3.6 3.6    109 111*   CO2 26 24 26   BUN 24* 17 17   CREATININE <0.3* 0.4* 0.3*   CALCIUM 8.5* 8.2* 8.7   PROT 8.5* 8.0 8.0   BILITOT 0.5 0.9 1.0   ALKPHOS 143* 135 128   ALT 30 27 27   AST 23 27 24     No results for input(s): CRP in the last 168 hours.      Micro:  Microbiology Results (last 7 days)       Procedure Component Value Units Date/Time    Blood culture #1 **CANNOT BE ORDERED STAT** [[id number]]  (Abnormal) Collected: 07/06/23 0953    Order Status: Completed Specimen: Blood Updated: 07/09/23 0703     Blood Culture, Routine Gram stain aer bottle: Gram positive cocci      Results called to and read back by: Aster Nielsen RN  07/07/2023      20:36 KS3      COAGULASE-NEGATIVE STAPHYLOCOCCUS SPECIES  Organism is a probable contaminant      Urine culture [[id number]]  (Abnormal)  (Susceptibility) Collected: 07/06/23 1058    Order Status: Completed Specimen: Urine Updated: 07/09/23 0637     Urine Culture, Routine PROTEUS MIRABILIS ESBL  >100,000 cfu/ml  Identification and susceptibility pending        PROTEUS MIRABILIS  Identification and susceptibility pending      Narrative:      Collection Type->Urine, Clean Catch    Blood culture [[id number]] Collected: 07/07/23 2141    Order Status: Completed Specimen: Blood Updated: 07/09/23 0232     Blood Culture, Routine No Growth to date      No Growth to date    Blood culture #2 **CANNOT BE ORDERED STAT** [[id number]] Collected: 07/06/23  0953    Order Status: Completed Specimen: Blood Updated: 07/08/23 1032     Blood Culture, Routine No Growth to date      No Growth to date      No Growth to date    Culture, Respiratory with Gram Stain [[id number]]  (Abnormal)  (Susceptibility) Collected: 07/06/23 0913    Order Status: Completed Specimen: Respiratory from Sputum Updated: 07/08/23 0644     Respiratory Culture SERRATIA MARCESCENS  Moderate       Gram Stain (Respiratory) Moderate WBC's     Gram Stain (Respiratory) Few Gram positive cocci     Gram Stain (Respiratory) Few Gram positive rods     Gram Stain (Respiratory) Few Gram negative rods    Rapid Organism ID by PCR (from Blood culture) [[id number]]  (Abnormal) Collected: 07/06/23 0953    Order Status: Completed Updated: 07/07/23 2153     Enterococcus faecalis Not Detected     Enterococcus faecium Not Detected     Listeria monocytogenes Not Detected     Staphylococcus spp. Detected     Staphylococcus aureus Not Detected     Staphylococcus epidermidis Not Detected     Staphylococcus lugdunensis Not Detected     Streptococcus species Not Detected     Streptococcus agalactiae Not Detected     Streptococcus pneumoniae Not Detected     Streptococcus pyogenes Not Detected     Acinetobacter calcoaceticus/baumannii complex Not Detected     Bacteroides fragilis Not Detected     Enterobacterales Not Detected     Enterobacter cloacae complex Not Detected     Escherichia coli Not Detected     Klebsiella aerogenes Not Detected     Klebsiella oxytoca Not Detected     Klebsiella pneumoniae group Not Detected     Proteus Not Detected     Salmonella sp Not Detected     Serratia marcescens Not Detected     Haemophilus influenzae Not Detected     Neisseria meningtidis Not Detected     Pseudomonas aeruginosa Not Detected     Stenotrophomonas maltophilia Not Detected     Candida albicans Not Detected     Candida auris Not Detected     Candida glabrata Not Detected     Candida krusei Not Detected     Candida parapsilosis Not  Detected     Candida tropicalis Not Detected     Cryptococcus neoformans/gattii Not Detected     CTX-M (ESBL ) Test not applicable     IMP (Carbapenem resistant) Test not applicable     KPC resistance gene (Carbapenem resistant) Test not applicable     mcr-1  Test not applicable     mec A/C  Test not applicable     mec A/C and MREJ (MRSA) gene Test not applicable     NDM (Carbapenem resistant) Test not applicable     OXA-48-like (Carbapenem resistant) Test not applicable     van A/B (VRE gene) Test not applicable     VIM (Carbapenem resistant) Test not applicable    MRSA Screen by PCR [[id number]] Collected: 07/06/23 2005    Order Status: Completed Specimen: Nasopharyngeal Swab from Nasal Updated: 07/06/23 2146     MRSA SCREEN BY PCR Negative            Imaging Reviewed:   CXR   CT  abd/pelvis     Cardiology:    IMPRESSION & PLAN   1.  Bradycardia, resolved    2.  Hypothermia (often how she presents with infection), improved.  Cultures and stimulation test is adequate   Probably due to UTI, urine grossly purulent, culture negative at less than 1 day. 2 different Proteus species on culture, 1 of which is ESBL   Lactic acid normal x 2   Procalcitonin normal   Blood culture with contaminant, coag neg staph    3. History of MDRO in sputum without pneumonia on imaging, but some increase in interstitium which may be fluid   Serratia on current culture  4. Ventilator dependent, vegetative state  5.  Severe anemia           Recommendations:  Meropenem for   ESBL UTIs , today is day 4.  Would give another  4 days      Wound care    Awaiting upsize of trach  Enhanced contact isolation   (Candida auris negative)  ?  Wonder she would respond to some diuretics now.  D/w nursing      Will be available p.r.n., please call if needed, thank you    Medical Decision Making during this encounter was  [_] Low Complexity  [_] Moderate Complexity  [ xx ] High Complexity

## 2023-07-10 VITALS
HEART RATE: 70 BPM | HEIGHT: 60 IN | BODY MASS INDEX: 38.34 KG/M2 | SYSTOLIC BLOOD PRESSURE: 104 MMHG | WEIGHT: 195.31 LBS | DIASTOLIC BLOOD PRESSURE: 57 MMHG | OXYGEN SATURATION: 98 % | TEMPERATURE: 98 F | RESPIRATION RATE: 27 BRPM

## 2023-07-10 PROBLEM — E87.6 HYPOKALEMIA: Status: RESOLVED | Noted: 2023-07-07 | Resolved: 2023-07-10

## 2023-07-10 LAB
ALBUMIN SERPL BCP-MCNC: 2.9 G/DL (ref 3.5–5.2)
ALP SERPL-CCNC: 129 U/L (ref 55–135)
ALT SERPL W/O P-5'-P-CCNC: 24 U/L (ref 10–44)
ANION GAP SERPL CALC-SCNC: 5 MMOL/L (ref 8–16)
AST SERPL-CCNC: 20 U/L (ref 10–40)
BILIRUB SERPL-MCNC: 0.9 MG/DL (ref 0.1–1)
BUN SERPL-MCNC: 19 MG/DL (ref 6–20)
CALCIUM SERPL-MCNC: 9 MG/DL (ref 8.7–10.5)
CHLORIDE SERPL-SCNC: 107 MMOL/L (ref 95–110)
CO2 SERPL-SCNC: 27 MMOL/L (ref 23–29)
CREAT SERPL-MCNC: 0.3 MG/DL (ref 0.5–1.4)
ERYTHROCYTE [DISTWIDTH] IN BLOOD BY AUTOMATED COUNT: 20.7 % (ref 11.5–14.5)
EST. GFR  (NO RACE VARIABLE): >60 ML/MIN/1.73 M^2
GLUCOSE SERPL-MCNC: 94 MG/DL (ref 70–110)
HCT VFR BLD AUTO: 25.3 % (ref 37–48.5)
HGB BLD-MCNC: 7.3 G/DL (ref 12–16)
MCH RBC QN AUTO: 26.1 PG (ref 27–31)
MCHC RBC AUTO-ENTMCNC: 28.9 G/DL (ref 32–36)
MCV RBC AUTO: 90 FL (ref 82–98)
PLATELET # BLD AUTO: 242 K/UL (ref 150–450)
PMV BLD AUTO: 11 FL (ref 9.2–12.9)
POTASSIUM SERPL-SCNC: 3.5 MMOL/L (ref 3.5–5.1)
PROT SERPL-MCNC: 8.1 G/DL (ref 6–8.4)
RBC # BLD AUTO: 2.8 M/UL (ref 4–5.4)
SODIUM SERPL-SCNC: 139 MMOL/L (ref 136–145)
WBC # BLD AUTO: 5.52 K/UL (ref 3.9–12.7)

## 2023-07-10 PROCEDURE — 25000003 PHARM REV CODE 250: Performed by: INTERNAL MEDICINE

## 2023-07-10 PROCEDURE — 94003 VENT MGMT INPAT SUBQ DAY: CPT

## 2023-07-10 PROCEDURE — 94761 N-INVAS EAR/PLS OXIMETRY MLT: CPT

## 2023-07-10 PROCEDURE — 94640 AIRWAY INHALATION TREATMENT: CPT

## 2023-07-10 PROCEDURE — 99900026 HC AIRWAY MAINTENANCE (STAT)

## 2023-07-10 PROCEDURE — 85027 COMPLETE CBC AUTOMATED: CPT | Performed by: INTERNAL MEDICINE

## 2023-07-10 PROCEDURE — 99900031 HC PATIENT EDUCATION (STAT)

## 2023-07-10 PROCEDURE — 99900035 HC TECH TIME PER 15 MIN (STAT)

## 2023-07-10 PROCEDURE — 63600175 PHARM REV CODE 636 W HCPCS: Performed by: INTERNAL MEDICINE

## 2023-07-10 PROCEDURE — 99233 PR SUBSEQUENT HOSPITAL CARE,LEVL III: ICD-10-PCS | Mod: ,,, | Performed by: INTERNAL MEDICINE

## 2023-07-10 PROCEDURE — 80053 COMPREHEN METABOLIC PANEL: CPT | Performed by: INTERNAL MEDICINE

## 2023-07-10 PROCEDURE — 99233 SBSQ HOSP IP/OBS HIGH 50: CPT | Mod: ,,, | Performed by: INTERNAL MEDICINE

## 2023-07-10 PROCEDURE — 25000242 PHARM REV CODE 250 ALT 637 W/ HCPCS: Performed by: INTERNAL MEDICINE

## 2023-07-10 PROCEDURE — 94799 UNLISTED PULMONARY SVC/PX: CPT

## 2023-07-10 RX ORDER — GENTAMICIN SULFATE 3 MG/ML
1 SOLUTION/ DROPS OPHTHALMIC 4 TIMES DAILY
Start: 2023-07-10 | End: 2023-07-20

## 2023-07-10 RX ORDER — ONDANSETRON 4 MG/1
4 TABLET, FILM COATED ORAL EVERY 8 HOURS PRN
COMMUNITY

## 2023-07-10 RX ORDER — DOXYLAMINE SUCCINATE 25 MG
TABLET ORAL 2 TIMES DAILY
Refills: 0
Start: 2023-07-10 | End: 2023-07-24

## 2023-07-10 RX ADMIN — MICONAZOLE NITRATE: 20 CREAM TOPICAL at 09:07

## 2023-07-10 RX ADMIN — LEVALBUTEROL HYDROCHLORIDE 1.25 MG: 1.25 SOLUTION RESPIRATORY (INHALATION) at 07:07

## 2023-07-10 RX ADMIN — MUPIROCIN 1 G: 20 OINTMENT TOPICAL at 09:07

## 2023-07-10 RX ADMIN — IPRATROPIUM BROMIDE 0.5 MG: 0.5 SOLUTION RESPIRATORY (INHALATION) at 12:07

## 2023-07-10 RX ADMIN — MIDODRINE HYDROCHLORIDE 10 MG: 2.5 TABLET ORAL at 09:07

## 2023-07-10 RX ADMIN — GENTAMICIN SULFATE 1 DROP: 3 SOLUTION OPHTHALMIC at 09:07

## 2023-07-10 RX ADMIN — POTASSIUM BICARBONATE 50 MEQ: 977.5 TABLET, EFFERVESCENT ORAL at 05:07

## 2023-07-10 RX ADMIN — LEVALBUTEROL HYDROCHLORIDE 1.25 MG: 1.25 SOLUTION RESPIRATORY (INHALATION) at 12:07

## 2023-07-10 RX ADMIN — MEROPENEM 1 G: 1 INJECTION, POWDER, FOR SOLUTION INTRAVENOUS at 09:07

## 2023-07-10 RX ADMIN — MEROPENEM 1 G: 1 INJECTION, POWDER, FOR SOLUTION INTRAVENOUS at 01:07

## 2023-07-10 RX ADMIN — IPRATROPIUM BROMIDE 0.5 MG: 0.5 SOLUTION RESPIRATORY (INHALATION) at 07:07

## 2023-07-10 NOTE — PROGRESS NOTES
[facility] Medicine  Progress Note    Patient Name: Genna Felix  MRN: [id number]  Patient Class: IP- Inpatient   Admission Date: 7/6/2023  Length of Stay: 3 days  Attending Physician: Lanre Goss MD  Primary Care Provider: Primary Doctor No        Subjective:     Principal Problem:Hypothermia        HPI:  62 year old nursing home pt again getting admitted with hypotension/bradycardia and hypothermia  Pt has h/o anoxic brain injury status post tracheostomy/peg tube  Also  history of multidrug resistant organisms / and has an  indwelling cholecystostomy tube   Per NH records she is not on any abx  Because of unstable vital signs she was send to ER and got admitted        Overview/Hospital Course:  No notes on file    Interval History:  not much change in status.  Vitals stable.  Continues on meropenem for UTI.    Review of Systems   Unable to perform ROS: Patient unresponsive   Objective:     Vital Signs (Most Recent):  Temp: 97.3 °F (36.3 °C) (07/09/23 1908)  Pulse: 72 (07/09/23 2000)  Resp: (!) 26 (07/09/23 2000)  BP: (!) 99/55 (07/09/23 2000)  SpO2: 96 % (07/09/23 2000) Vital Signs (24h Range):  Temp:  [95 °F (35 °C)-97.3 °F (36.3 °C)] 97.3 °F (36.3 °C)  Pulse:  [49-81] 72  Resp:  [11-40] 26  SpO2:  [92 %-98 %] 96 %  BP: ()/(55-78) 99/55     Weight: 88.6 kg (195 lb 5.2 oz)  Body mass index is 38.15 kg/m².    Intake/Output Summary (Last 24 hours) at 7/9/2023 2105  Last data filed at 7/9/2023 1832  Gross per 24 hour   Intake 1901 ml   Output 1925 ml   Net -24 ml           Physical Exam  Constitutional:       Interventions: She is intubated.   Eyes:      Conjunctiva/sclera:      Right eye: Right conjunctiva is not injected. No exudate.     Left eye: Left conjunctiva is not injected. No exudate.  Neck:      Vascular: No JVD.      Trachea: Tracheostomy present.   Cardiovascular:      Rate and Rhythm: Normal rate and regular rhythm.   Pulmonary:      Effort: She is intubated.       Breath sounds: Rhonchi present.      Comments: Trach connected to ventilator  Abdominal:      General: Bowel sounds are decreased. There is distension.      Palpations: Abdomen is soft.   Musculoskeletal:      Right lower leg: Edema present.      Left lower leg: Edema present.   Skin:     Comments: Stage 3 decubitus on sacrum   Neurological:      Comments: Eyes open.  Doesn't respond.           Significant Labs: All pertinent labs within the past 24 hours have been reviewed.    Significant Imaging: I have reviewed all pertinent imaging results/findings within the past 24 hours.      Assessment/Plan:      * Hypothermia  Resolved.    Decubitus ulcer of sacral region, stage 3  Consult with wound care service.  Ulcer was present prior to current admission.      Hypokalemia  Supplement potassium and monitor level.    Potassium   Date Value Ref Range Status   07/09/2023 3.6 3.5 - 5.1 mmol/L Final           Urinary tract infection due to Proteus  Continue IVAB.  She's on meropenem.  Urine culture has Proteus with ESBL..      Low serum cortisol level  Cortisol level low, despite her being sick.   Cosyntropin stimulation test results were normal.  Unlikely to have adrenal insufficiency.    Multiple drug resistant organism (MDRO) culture positive  This has been a constant issue with her.      Ventilator dependence  Chronic issue.  Pulmonologist assisting with management.      PEG (percutaneous endoscopic gastrostomy) status  Aware  Continue tube feeding.    Tracheostomy dependence  There's an air leak.  Her trach ws downsized at the nursing facility.  Pulmonologist and respiratory therapy are following.      Persistent vegetative state  Aware   From anoxic brain damage      Anemia of chronic disease  Patient's anemia is currently controlled. Has not received any PRBCs to date.. Etiology likely d/t chronic disease.  Current CBC reviewed-   Lab Results   Component Value Date    HGB 7.4 (L) 07/09/2023    HCT 25.2 (L) 07/09/2023      Monitor serial CBC and transfuse if patient becomes hemodynamically unstable, symptomatic or H/H drops below 7/21.           VTE Risk Mitigation (From admission, onward)         Ordered     enoxaparin injection 40 mg  Daily         07/06/23 1645     IP VTE HIGH RISK PATIENT  Once         07/06/23 1554     Place sequential compression device  Until discontinued         07/06/23 1554                Discharge Planning   [place]:      Code Status: DNR   Is the patient medically ready for discharge?:     Reason for patient still in hospital (select all that apply): Patient trending condition, Laboratory test and Treatment  Discharge Plan A: Return to nursing home                  Lanre Goss MD  Department of Hospital Medicine   Formerly [facility]

## 2023-07-10 NOTE — CARE UPDATE
07/10/23 0723   Patient Assessment/Suction   Level of Consciousness (AVPU) responds to pain   Respiratory Effort Normal;Unlabored   Expansion/Accessory Muscles/Retractions expansion symmetric;no retractions;no use of accessory muscles   All Lung Fields Breath Sounds coarse   Rhythm/Pattern, Respiratory assisted mechanically;pattern regular;depth regular   Cough Frequency with stimulation   Cough Type assisted   Suction Method tracheal   Suction Pressure (mmHg) -120 mmHg   $ Suction Charges Inline Suction Procedure Stat Charge   Secretions Amount small   Secretions Color cloudy;white   Secretions Characteristics thick   PRE-TX-O2   Device (Oxygen Therapy) ventilator   Oxygen Concentration (%) 50   SpO2 100 %   Pulse 79   Resp (!) 23   Aerosol Therapy   $ Aerosol Therapy Charges Aerosol Treatment   Daily Review of Necessity (SVN) completed   Respiratory Treatment Status (SVN) given   Treatment Route (SVN) in-line;ventilator   Patient Position (SVN) HOB elevated   Post Treatment Assessment (SVN) breath sounds unchanged   Signs of Intolerance (SVN) none   Adult Surgical Airway Shiley Cuffed 6.0/ 75mm   No placement date or time found.   Present Prior to Hospital Arrival?: Yes  Type: Tracheostomy  Brand: Shiley  Airway Device Style: Cuffed  Airway Device Size: 6.0/ 75mm   Cuff Pressure   (mlt)   Cuff Inflation? Inflated   Status Secured   Site Assessment Clean;Dry;No bleeding;No drainage   Ties Assessment Clean   Vent Select   $ Ventilator Subsequent 1   Charged w/in last 24h YES   Preset Conventional Ventilator Settings   Vent Type    Ventilation Type VC   Vent Mode A/C   Set Rate 20 BPM   Vt Set 400 mL   PEEP/CPAP 8 cmH20   Peak Flow 55 L/min   Peak End Inspiratory Pressure 47 cmH20   I-Trigger Type  V-TRIG   Trigger Sensitivity Flow/I-Trigger 3 L/min   Patient Ventilator Parameters   Resp Rate Total 29 br/min   Peak Airway Pressure 50 cmH20   Mean Airway Pressure 22 cmH20   Plateau Pressure 23 cmH20   Exhaled  Vt 563 mL   Total Ve 11.9 L/m   I:E Ratio Measured 1:2.50   Auto PEEP 0 cmH20   Conventional Ventilator Alarms   Resp Rate High Alarm 45 br/min   Press High Alarm 60 cmH2O   Apnea Rate 10   Apnea Volume (mL) 0 mL   Apnea Oxygen Concentration  100   Apnea Flow Rate (L/min) 63   T Apnea 20 sec(s)   Ready to Wean/Extubation Screen   FIO2<=50 (chart decimal) 0.5   MV<16L (chart vol.) 11.9   PEEP <=8 (chart #) 8   Ready to Wean Parameters   F/VT Ratio<105 (RSBI) (!) 40.85   Vital Capacity   Vital Capacity (mL) 0   Education   $ Education Bronchodilator;Ventilator Oxygen;15 min   Respiratory Evaluation   $ Care Plan Tech Time 15 min   $ Eval/Re-eval Charges Re-evaluation

## 2023-07-10 NOTE — PROGRESS NOTES
Pulmonary/Critical Care  Progress Note      Patient name: Genna Felix  MRN: [id number]  Date: 07/10/2023    Admit Date: 7/6/2023  Consult Requested By: Lanre Goss MD    Reason for Consult: respiratory failure, chronic ventilator    HPI:    7/7/2023 - 61 yo chronic vent sent to ER for hypothermia and bradycardia, she is unresponsive and not able to provide any history.  Chart has been reviewed.  She has been seen by Dr Perez    7/8/2023 - By report her tach was downsized from an * to 6 at NH.  We now have positional issues and cuff leak and loss of TV at times.  We do not know the timing of the change.  Ppeak is elevated but Pplateau is OK.  She remains unresponsive to me.  She is tachycardic and seems uncomfortable.  H/H has decreased.  SC + serratia, UC + proteus.    7/9/2023 - Had issues last night with leak, respiratory was able to change trach to #8.  Airway pressures are better and she is in no distress.  Dr Perez's note reviewed.  Remains hypothermic.  Multiple + cultures but initial procalcitonin is low.  CXR reviewed    7/10/2023 - Stable on ventilator and leak issues much better with the larger trach (would not recommend downsizing her trach).  She is still hypothermic at times.  Neuro status is unchanged and she may be transferred back to NH today.    Review of Systems    Review of Systems   Unable to perform ROS: Mental acuity     Past Medical History    Past Medical History:   Diagnosis Date    Anoxic brain damage 07/2020    Bedbound 07/2020    Cardiac arrest 07/2020    Cirrhosis     GERD (gastroesophageal reflux disease)     Hemangioma of intra-abdominal structure     Hypertension     Nursing home resident     Protein calorie malnutrition     Pulmonary embolus     Respiratory distress     S/P percutaneous endoscopic gastrostomy (PEG) tube placement 07/2020    Total self-care deficit 07/2020    Tracheostomy dependence     7/2020       Past Surgical History    Past Surgical History:   Procedure  Laterality Date    PEG W/TRACHEOSTOMY PLACEMENT  07/27/2020    SKIN GRAFT      buttocks       Medications (scheduled):      enoxparin  40 mg Subcutaneous Daily    gentamicin  1 drop Both Eyes QID    levalbuterol  1.25 mg Nebulization Q8H    And    ipratropium  0.5 mg Nebulization Q8H    meropenem (MERREM) IVPB  1 g Intravenous Q8H    miconazole   Topical (Top) BID    midodrine  10 mg Per G Tube TID    mupirocin   Nasal BID       Medications (infusions):           Medications (prn):     acetaminophen, magnesium oxide, magnesium oxide, morphine, potassium bicarbonate, potassium bicarbonate, potassium bicarbonate, potassium, sodium phosphates    Family History:   Family History   Problem Relation Age of Onset    Hypertension Mother     No Known Problems Father        Social History: Tobacco:   Social History     Tobacco Use   Smoking Status Never   Smokeless Tobacco Never                                EtOH:   Social History     Substance and Sexual Activity   Alcohol Use Not Currently                                Drugs:   Social History     Substance and Sexual Activity   Drug Use Not Currently     Physical Exam    Vital signs:  Temp:  [96 °F (35.6 °C)-98.4 °F (36.9 °C)]   Pulse:  []   Resp:  [20-40]   BP: ()/(52-74)   SpO2:  [88 %-100 %]     Intake/Output:   Intake/Output Summary (Last 24 hours) at 7/10/2023 1307  Last data filed at 7/10/2023 0921  Gross per 24 hour   Intake 1620 ml   Output 2250 ml   Net -630 ml          BMI: Estimated body mass index is 38.15 kg/m² as calculated from the following:    Height as of this encounter: 5' (1.524 m).    Weight as of this encounter: 88.6 kg (195 lb 5.2 oz).    Physical Exam  Vitals and nursing note reviewed.   Constitutional:       General: She is not in acute distress.     Appearance: She is not ill-appearing, toxic-appearing or diaphoretic.      Comments: Chronically ill  Vent dependent  Eyes open but does not respond to me   HENT:      Head: Normocephalic  and atraumatic.      Right Ear: External ear normal.      Left Ear: External ear normal.      Nose: Nose normal. No congestion or rhinorrhea.      Mouth/Throat:      Mouth: Mucous membranes are moist.      Pharynx: Oropharynx is clear. No oropharyngeal exudate or posterior oropharyngeal erythema.   Eyes:      General: No scleral icterus.        Right eye: No discharge.         Left eye: No discharge.      Extraocular Movements: Extraocular movements intact.      Conjunctiva/sclera: Conjunctivae normal.      Pupils: Pupils are equal, round, and reactive to light.   Neck:      Vascular: No carotid bruit.      Comments: Trach in place   Cardiovascular:      Rate and Rhythm: Normal rate and regular rhythm.      Pulses: Normal pulses.      Heart sounds: No murmur heard.    No friction rub. No gallop.   Pulmonary:      Effort: Pulmonary effort is normal. No respiratory distress.      Breath sounds: No stridor. Rhonchi present. No wheezing or rales.      Comments: Ventlated   PEG  Chest:      Chest wall: No tenderness.   Abdominal:      General: There is distension.      Tenderness: There is no abdominal tenderness. There is no guarding.      Comments: PEG  BS hypoactive   Musculoskeletal:         General: No swelling. Normal range of motion.      Cervical back: Normal range of motion and neck supple. No rigidity or tenderness.      Right lower leg: Edema present.      Left lower leg: Edema present.      Comments: Chronic edema   Lymphadenopathy:      Cervical: No cervical adenopathy.   Skin:     General: Skin is warm and dry.      Coloration: Skin is not jaundiced.      Findings: No bruising.      Comments: + sacral wound and hand wound (POA)   Neurological:      Comments: At baseline   Psychiatric:      Comments: Not able to assess  Calm        Laboratory    Recent Labs   Lab 07/10/23  0335   WBC 5.52   RBC 2.80*   HGB 7.3*   HCT 25.3*      MCV 90   MCH 26.1*   MCHC 28.9*         Recent Labs   Lab 07/10/23  0335    CALCIUM 9.0   PROT 8.1      K 3.5   CO2 27      BUN 19   CREATININE 0.3*   ALKPHOS 129   ALT 24   AST 20   BILITOT 0.9         No results for input(s): PT, INR, APTT in the last 24 hours.    No results for input(s): CPK, CPKMB, TROPONINI, MB in the last 24 hours.    Additional labs: reviewed    Microbiology:       Microbiology Results (last 7 days)       Procedure Component Value Units Date/Time    Blood culture #2 **CANNOT BE ORDERED STAT** [[id number]] Collected: 07/06/23 0953    Order Status: Completed Specimen: Blood Updated: 07/10/23 1032     Blood Culture, Routine No Growth to date      No Growth to date      No Growth to date      No Growth to date      No Growth to date    Blood culture [[id number]] Collected: 07/07/23 2141    Order Status: Completed Specimen: Blood Updated: 07/10/23 0232     Blood Culture, Routine No Growth to date      No Growth to date      No Growth to date    Urine culture [[id number]]  (Abnormal)  (Susceptibility) Collected: 07/06/23 1058    Order Status: Completed Specimen: Urine Updated: 07/09/23 1258     Urine Culture, Routine PROTEUS MIRABILIS ESBL  >100,000 cfu/ml        PROTEUS MIRABILIS    Narrative:      Collection Type->Urine, Clean Catch    Blood culture #1 **CANNOT BE ORDERED STAT** [[id number]]  (Abnormal) Collected: 07/06/23 0953    Order Status: Completed Specimen: Blood Updated: 07/09/23 0703     Blood Culture, Routine Gram stain aer bottle: Gram positive cocci      Results called to and read back by: Aster Nielsen RN  07/07/2023      20:36 KS3      COAGULASE-NEGATIVE STAPHYLOCOCCUS SPECIES  Organism is a probable contaminant      Culture, Respiratory with Gram Stain [[id number]]  (Abnormal)  (Susceptibility) Collected: 07/06/23 0913    Order Status: Completed Specimen: Respiratory from Sputum Updated: 07/08/23 0644     Respiratory Culture SERRATIA MARCESCENS  Moderate       Gram Stain (Respiratory) Moderate WBC's     Gram Stain (Respiratory) Few Gram  positive cocci     Gram Stain (Respiratory) Few Gram positive rods     Gram Stain (Respiratory) Few Gram negative rods    Rapid Organism ID by PCR (from Blood culture) [[id number]]  (Abnormal) Collected: 07/06/23 0953    Order Status: Completed Updated: 07/07/23 2153     Enterococcus faecalis Not Detected     Enterococcus faecium Not Detected     Listeria monocytogenes Not Detected     Staphylococcus spp. Detected     Staphylococcus aureus Not Detected     Staphylococcus epidermidis Not Detected     Staphylococcus lugdunensis Not Detected     Streptococcus species Not Detected     Streptococcus agalactiae Not Detected     Streptococcus pneumoniae Not Detected     Streptococcus pyogenes Not Detected     Acinetobacter calcoaceticus/baumannii complex Not Detected     Bacteroides fragilis Not Detected     Enterobacterales Not Detected     Enterobacter cloacae complex Not Detected     Escherichia coli Not Detected     Klebsiella aerogenes Not Detected     Klebsiella oxytoca Not Detected     Klebsiella pneumoniae group Not Detected     Proteus Not Detected     Salmonella sp Not Detected     Serratia marcescens Not Detected     Haemophilus influenzae Not Detected     Neisseria meningtidis Not Detected     Pseudomonas aeruginosa Not Detected     Stenotrophomonas maltophilia Not Detected     Candida albicans Not Detected     Candida auris Not Detected     Candida glabrata Not Detected     Candida krusei Not Detected     Candida parapsilosis Not Detected     Candida tropicalis Not Detected     Cryptococcus neoformans/gattii Not Detected     CTX-M (ESBL ) Test not applicable     IMP (Carbapenem resistant) Test not applicable     KPC resistance gene (Carbapenem resistant) Test not applicable     mcr-1  Test not applicable     mec A/C  Test not applicable     mec A/C and MREJ (MRSA) gene Test not applicable     NDM (Carbapenem resistant) Test not applicable     OXA-48-like (Carbapenem resistant) Test not applicable      van A/B (VRE gene) Test not applicable     VIM (Carbapenem resistant) Test not applicable    MRSA Screen by PCR [[id number]] Collected: 07/06/23 2005    Order Status: Completed Specimen: Nasopharyngeal Swab from Nasal Updated: 07/06/23 2146     MRSA SCREEN BY PCR Negative            Radiology    X-Ray Chest AP Portable  HISTORY: Trach exchange    COMPARISON:7/6/2023    FINDINGS:Tracheostomy tube exchange noted with the end point of the tracheostomy tube terminating at the medial aspect proximal position. Scattered initial opacities are identified restricted towards the mid to lower lung zones unchanged since previous. Both costophrenic sulci are clear without evidence of effusion. Osseous structures reveal no significant finding. Heart normal in size.    IMPRESSION:  1. Tracheostomy tube exchange with optimal positioning.  2. Diffuse interstitial opacities are attributed towards hilar atelectasis, scarring, or atypical infection as previous.    Electronically signed by:  Robert Hannon MD  7/9/2023 11:03 AM CDT Workstation: [id number]        Additional Studies    reviewed    Ventilator Information    Vent Mode: A/C  Oxygen Concentration (%):  [50] 50  Resp Rate Total:  [20 br/min-45 br/min] 23 br/min  Vt Set:  [400 mL] 400 mL  PEEP/CPAP:  [8 cmH20] 8 cmH20  Pressure Support:  [0 cmH20] 0 cmH20  Mean Airway Pressure:  [10.9 koG99-62 cmH20] 16 cmH20             No results for input(s): PH, PCO2, PO2, HCO3, POCSATURATED, BE in the last 72 hours.      Impression    Active Hospital Problems    Diagnosis  POA    Low serum cortisol level [R79.89]  Yes    Urinary tract infection due to Proteus [N39.0, B96.4]  Yes    Decubitus ulcer of sacral region, stage 3 [L89.153]  Yes    Multiple drug resistant organism (MDRO) culture positive [Z16.24]  Yes    Ventilator dependence [Z99.11]  Not Applicable    PEG (percutaneous endoscopic gastrostomy) status [Z93.1]  Not Applicable    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]  Not Applicable     Persistent vegetative state [R40.3]  Yes    Anemia of chronic disease [D63.8]  Yes      Resolved Hospital Problems    Diagnosis Date Resolved POA    *Hypothermia [T68.XXXA] 07/09/2023 Yes    Hypokalemia [E87.6] 07/10/2023 Yes       Plan    Continue ventilator at present settings, she is vent dependent and no attempts at weaning will be made  Antibiotics per Dr Perez - can be finished at NH  Beth Pedrogger as needed  Watch H/H transfuse for hgb < 7  Ventilating pressures are better with larger trach  Not adrenally insufficient by ACTH stimulation test though she did just pass the test  ? Transfer back to NH today      Thank you for this consult.  I will follow with you while the patient is hospitalized.        Reji Andrews MD  [facility] Pulmonary/Critical Care  07/10/2023

## 2023-07-10 NOTE — PLAN OF CARE
[facility]  Facility Transfer Orders        [facility] to: [facility]    Diagnoses:   Active Hospital Problems    Diagnosis  POA    Low serum cortisol level [R79.89]  Yes    Urinary tract infection due to Proteus [N39.0, B96.4]  Yes    Decubitus ulcer of sacral region, stage 3 [L89.153]  Yes    Multiple drug resistant organism (MDRO) culture positive [Z16.24]  Yes    Ventilator dependence [Z99.11]  Not Applicable    PEG (percutaneous endoscopic gastrostomy) status [Z93.1]  Not Applicable    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]  Not Applicable    Persistent vegetative state [R40.3]  Yes    Anemia of chronic disease [D63.8]  Yes      Resolved Hospital Problems    Diagnosis Date Resolved POA    *Hypothermia [T68.XXXA] 07/09/2023 Yes    Hypokalemia [E87.6] 07/10/2023 Yes     Allergies: Review of patient's allergies indicates:  No Known Allergies    Code Status: DNR    Vitals: Routine       Diet:  Tube feed:  Jevity 1.5 at 45 mL/h.  150 mL water flush q4.  30 mL water flush before and after meds.    Activity:  Bedridden    Nursing Precautions: Pressure ulcer prevention    Miscellaneous Care:   PEG Care:  Clean site every 24 hours  Hamilton Care: Empty Hamilton bag every shift.  Change Hamilton every month  Wound Care: Stage 3 on sacrum -- cleanse daily with mild soap and water and cover with island dressing    IV Access: Mid-line     Current Discharge Medication List        START taking these medications    Details   gentamicin (GARAMYCIN) 0.3 % ophthalmic solution Place 1 drop into both eyes 4 (four) times daily. Reason:  superficial bacterial ocular infection for 10 days      MEROPENEM 1 G/100 ML NS, READY TO MIX SYSTEM, Inject 100 mLs (1 g total) into the vein every 8 (eight) hours. Reason:  ESBL Proteus urinary tract infection for 4 days  Qty: 1200 mL, Refills: 0      miconazole (MICOTIN) 2 % cream Apply topically 2 (two) times daily. Reason:  Tinea pedis for 14 days  Refills: 0           CONTINUE these  medications which have NOT CHANGED    Details   acetylcysteine 100 mg/ml, 10%, (MUCOMYST) 100 mg/mL (10 %) nebulizer solution Take 4 mLs by nebulization every 8 (eight) hours.  Refills: 12      arginine/glutamine/calcium bmb (XIMOARA ORAL) 1 Package by PEG Tube route 2 (two) times a day. In 8oz of water      cefTRIAXone (ROCEPHIN) 1 gram injection Inject 1 g into the muscle daily as needed.      cycloSPORINE (RESTASIS) 0.05 % ophthalmic emulsion 1 drop 2 (two) times daily. 0900  2100      dextran 70-hypromellose (ARTIFICIAL TEARS,MEXX70-NFYGY,) ophthalmic solution Place 1 drop into both eyes 4 (four) times daily as needed.      ferrous sulfate 300 mg (60 mg iron)/5 mL syrup Take 300 mg by mouth As instructed. Every other day      lactose-reduced food (ISOSOURCE ORAL) 1.5 mg by Per G Tube route continuous. 1.5 per peg tube at 50ml/hr continuously      linaCLOtide (LINZESS) 145 mcg Cap capsule Take 145 mcg by mouth before breakfast.      midodrine (PROAMATINE) 10 MG tablet 1 tablet (10 mg total) by Per G Tube route 3 (three) times daily.  Qty: 90 tablet, Refills: 11      polyvinyl alcohol-povidone (CLEAR EYES NATURAL TEARS) 0.5-0.6 % Drop Apply 1 drop to eye daily as needed.      acetaminophen (TYLENOL) 325 MG tablet 2 tablets (650 mg total) by Per G Tube route every 4 (four) hours as needed for Pain or Temperature greater than (100.4F).  Refills: 0      albuterol-ipratropium (DUO-NEB) 2.5 mg-0.5 mg/3 mL nebulizer solution Take 3 mLs by nebulization every 6 (six) hours. Rescue  Qty: 1 Box, Refills: 0      ergocalciferol (ERGOCALCIFEROL) 50,000 unit Cap Take 50,000 Units by mouth every Saturday.      ondansetron (ZOFRAN) 4 MG tablet 4 mg by Per G Tube route every 8 (eight) hours as needed for Nausea.      polyethylene glycol (GLYCOLAX) 17 gram PwPk Take 17 g by mouth daily as needed.                   Lanre Goss MD  Critical access hospital

## 2023-07-10 NOTE — PROGRESS NOTES
[facility]  Adult Nutrition   Progress Note (Nutrition Support Management)    SUMMARY      Recommendations:   Continue current PEG tube feeding of Vital HP at 45 mls/h.       Goals:   1) Nutrition provision to meet 100% of pts energy and protein needs.   2) Pt to tolerate PEG-tube feeding at the goal rate of 45 mls/h.    Dietitian Rounds Brief  F/U Nutrition Note: Observed pt in ICU during rounds and found out that she has been discharged back to the NH. TF was hanging but not running.    Diet order: NPO    TF: Vital HP  Rate: 45 mls/h  Calories: 1080   Protein: 95 g  Fluid: 903 mls  Water flushes: 30 mls every 4 hours.    % Intake of Estimated Energy Needs: 75 - 100 %  % Meal Intake: NPO    Estimated/Assessed Needs  Weight Used For Calorie Calculations: 88.6 kg (195 lb 5.2 oz)  Energy Calorie Requirements (kcal): 974-1240 kcals/day (11-14 kcals/kg ABW)  Energy Need Method: Kcal/kg  Protein Requirements:  g/day (2-2.5 g/kg IBW)  Weight Used For Protein Calculations: 45 kg (99 lb 3.3 oz)     Estimated Fluid Requirement Method: RDA Method  RDA Method (mL): 974       Weight History:  Wt Readings from Last 5 Encounters:   07/06/23 88.6 kg (195 lb 5.2 oz)   06/12/23 77.1 kg (170 lb)   10/20/22 77.1 kg (170 lb)   08/26/22 77.2 kg (170 lb 3.1 oz)   08/10/22 76.6 kg (168 lb 14 oz)        Reason for Assessment  Reason For Assessment: consult, new TPN  Diagnosis: other (see comments) (Hypothermia)  Relevant Medical History: Respiratory distress, Cirrhosis, Hemangioma of intra-abdominal structure, Tracheostomy dependence, Anoxic brain damage, Pulmonary embolus, Hypertension, GERD (gastroesophageal reflux disease), Protein calorie malnutrition, Bedbound, Total self-care deficit, Nursing home resident, Cardiac arrest, Persistent vegetative state, PEG (percutaneous endoscopic gastrostomy) status, Ventilator dependence, Multiple drug resistant organism (MDRO) culture positive  Interdisciplinary Rounds:  (No  rounds on Fridays)    Medications:Pertinent Medications Reviewed  Scheduled Meds:   enoxparin  40 mg Subcutaneous Daily    gentamicin  1 drop Both Eyes QID    levalbuterol  1.25 mg Nebulization Q8H    And    ipratropium  0.5 mg Nebulization Q8H    meropenem (MERREM) IVPB  1 g Intravenous Q8H    miconazole   Topical (Top) BID    midodrine  10 mg Per G Tube TID    mupirocin   Nasal BID     Continuous Infusions:  PRN Meds:.acetaminophen, magnesium oxide, magnesium oxide, morphine, potassium bicarbonate, potassium bicarbonate, potassium bicarbonate, potassium, sodium phosphates    Labs: Pertinent Labs Reviewed  Clinical Chemistry:  Recent Labs   Lab 07/06/23  1041 07/07/23  0321 07/10/23  0335      < > 139   K 3.5   < > 3.5      < > 107   CO2 26   < > 27   GLU 93   < > 94   BUN 27*   < > 19   CREATININE 0.3*   < > 0.3*   CALCIUM 9.1   < > 9.0   PROT 9.5*   < > 8.1   ALBUMIN 3.3*   < > 2.9*   BILITOT 0.7   < > 0.9   ALKPHOS 165*   < > 129   AST 29   < > 20   ALT 34   < > 24   ANIONGAP 9   < > 5*   LIPASE 26  --   --     < > = values in this interval not displayed.     CBC:   Recent Labs   Lab 07/10/23  0335   WBC 5.52   RBC 2.80*   HGB 7.3*   HCT 25.3*      MCV 90   MCH 26.1*   MCHC 28.9*     Thyroid & Parathyroid:  Recent Labs   Lab 07/06/23  1041   TSH 2.510       Monitor and Evaluation  Food and Nutrient Intake: enteral nutrition intake  Food and Nutrient Adminstration: enteral and parenteral nutrition administration  Knowledge/Beliefs/Attitudes: beliefs and attitudes, food and nutrition knowledge/skill  Physical Activity and Function: nutrition-related ADLs and IADLs, factors affecting access to physical activity  Anthropometric Measurements: weight, weight change, body mass index  Biochemical Data, Medical Tests and Procedures: lipid profile, inflammatory profile, glucose/endocrine profile, gastrointestinal profile, electrolyte and renal panel  Nutrition-Focused Physical Findings: overall  appearance     Nutrition Risk  Level of Risk/Frequency of Follow-up: high     Nutrition Follow-Up  RD Follow-up?: Yes    Estrella Jo RD 07/10/2023 12:24 PM

## 2023-07-10 NOTE — PLAN OF CARE
07/10/23 1002   Post-Acute Status   Post-Acute Authorization Placement   Post-Acute Placement Status Set-up Complete/Auth obtained     Discharge order and AVS sent to [facility]. Awaiting number for report/transport information at this time.

## 2023-07-10 NOTE — DISCHARGE SUMMARY
[facility]  Discharge Summary      Patient Name: Genna Felix  MRN: [id number]  [facility]: [id number]  Patient Class: IP- Inpatient  Admission Date: 7/6/2023  Hospital Length of Stay: 4 days  Discharge Date and Time: 7/10/2023  1:30 PM  Attending Physician: No att. providers found   Discharging Provider: Lanre Goss MD  Primary Care Provider: Primary Doctor Nikky    Primary Care Team: Networked reference to record PCT     HPI:   62 year old nursing home pt again getting admitted with hypotension/bradycardia and hypothermia  Pt has h/o anoxic brain injury status post tracheostomy/peg tube  Also  history of multidrug resistant organisms / and has an  indwelling cholecystostomy tube   Per NH records she is not on any abx  Because of unstable vital signs she was send to ER and got admitted        * No surgery found *      Hospital Course:   She was admitted with the hypothermia, which was presumed to be secondary to infection.  Antibiotics were administered.  The urine sample showed that she had a catheter-associated UTI.  Infectious disease consultant was brought on the case.  Patient's core temperature improved.  A low random cortisol level was followed up with a Cosyntropin stimulation test, which was normal.  She didn't have adrenal insufficiency.  Urine grew Proteus, which had ESBL.  While admitted, we treated her for the stage 3 pressure ulcer on the sacrum.  We also switched the tracheostomy to a #8 because of issues with the #6.  She was eventually discharged back to [facility].  Dr. Perez recommended four more days of meropenem.    Physical Exam  Constitutional:       Interventions: She is intubated.   Eyes:      Conjunctiva/sclera:      Right eye: Right conjunctiva is not injected. No exudate.     Left eye: Left conjunctiva is not injected. No exudate.  Neck:      Vascular: No JVD.      Trachea: Tracheostomy present.   Cardiovascular:      Rate and Rhythm: Normal rate and  regular rhythm.   Pulmonary:      Effort: She is intubated.      Breath sounds: Rhonchi present.      Comments: Trach connected to ventilator  Abdominal:      General: Bowel sounds are decreased. There is distension.      Palpations: Abdomen is soft.          Goals of Care Treatment Preferences:  Code Status: DNR       [place]: Yes           Consults:   Consults (From admission, onward)        Status Ordering Provider     IP consult to case management  Once        Provider:  (Not yet assigned)    Completed FRANCISCO PARHAM     Inpatient consult to Infectious Diseases  Once        Provider:  Shahida Perez MD    Completed FRANCISCO PARHAM     Inpatient consult to Registered Dietitian/Nutritionist  Once        Provider:  (Not yet assigned)    Completed FRANCISCO PARHAM          No new Assessment & Plan notes have been filed under this hospital service since the last note was generated.  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] 05/01/2021 Not Applicable    Persistent vegetative state [R40.3] 04/21/2021 Yes    Anemia of chronic disease [D63.8] 04/21/2021 Yes      Problems Resolved During this Admission:    Diagnosis Date Noted Date Resolved POA    PRINCIPAL PROBLEM:  Hypothermia [T68.XXXA] 07/06/2023 07/09/2023 Yes    Hypokalemia [E87.6] 07/07/2023 07/10/2023 Yes       Discharged Condition: stable    Disposition: Intermediate Care Facili*    Follow Up:  [facility]    Patient Instructions:      Other restrictions (specify):    Order Comments: Bedridden state     Tube Feedings/Formulas   Order Comments: Flush 150 mL water every 4 hours     Order Specific Question Answer Comments   Select Adult Formula: Other Jevity 1.5   Route: Gastrostomy    Formula Rate (mL/hr): 45      Activity:  Patient is bedridden.  Needs turning frequently.    Significant Diagnostic Studies:   Urine culture:  Proteus mirabilis ESBL Proteus mirabilis       CULTURE, URINE CULTURE, URINE     Amp/Sulbactam 16/8 mcg/mL Intermediate <=4/2 mcg/mL Sensitive     Ampicillin >16 mcg/mL Resistant <=8 mcg/mL Sensitive     Cefazolin >16 mcg/mL Resistant <=2 mcg/mL Sensitive     Cefepime 4 mcg/mL Resistant <=2 mcg/mL Sensitive     Ceftriaxone 32 mcg/mL Resistant <=1 mcg/mL Sensitive     Ciprofloxacin >2 mcg/mL Resistant >2 mcg/mL Resistant     Ertapenem <=0.5 mcg/mL Sensitive <=0.5 mcg/mL Sensitive     Gentamicin >8 mcg/mL Resistant <=4 mcg/mL Sensitive     Levofloxacin >4 mcg/mL Resistant >4 mcg/mL Resistant     Meropenem <=1 mcg/mL Sensitive <=1 mcg/mL Sensitive     Nitrofurantoin >64 mcg/mL Resistant       Piperacillin/Tazo <=16 mcg/mL Sensitive <=16 mcg/mL Sensitive     Tetracycline >8 mcg/mL Resistant       Tobramycin 8 mcg/mL Intermediate <=4 mcg/mL Sensitive     Trimeth/Sulfa >2/38 mcg/mL Resistant <=2/38 mcg/mL Sensitive        Hemoglobin   Date Value Ref Range Status   07/10/2023 7.3 (L) 12.0 - 16.0 g/dL Final   07/09/2023 7.4 (L) 12.0 - 16.0 g/dL Final   07/08/2023 7.0 (L) 12.0 - 16.0 g/dL Final   07/07/2023 7.5 (L) 12.0 - 16.0 g/dL Final         Pending Diagnostic Studies:     None         Medications:  Reconciled Home Medications:      Medication List      START taking these medications    gentamicin 0.3 % ophthalmic solution  Commonly known as: GARAMYCIN  Place 1 drop into both eyes 4 (four) times daily. Reason:  superficial bacterial ocular infection for 10 days     MEROPENEM 1 G/100 ML NS (READY TO MIX SYSTEM)  Inject 100 mLs (1 g total) into the vein every 8  (eight) hours. Reason:  ESBL Proteus urinary tract infection for 4 days     miconazole 2 % cream  Commonly known as: MICOTIN  Apply topically 2 (two) times daily. Reason:  Tinea pedis for 14 days        CHANGE how you take these medications    midodrine 10 MG tablet  Commonly known as: PROAMATINE  1 tablet (10 mg total) by Per G Tube route 3 (three) times daily.  What changed: additional instructions        CONTINUE taking these medications    acetaminophen 325 MG tablet  Commonly known as: TYLENOL  2 tablets (650 mg total) by Per G Tube route every 4 (four) hours as needed for Pain or Temperature greater than (100.4F).     acetylcysteine 100 mg/ml (10%) 100 mg/mL (10 %) nebulizer solution  Commonly known as: MUCOMYST  Take 4 mLs by nebulization every 8 (eight) hours.     albuterol-ipratropium 2.5 mg-0.5 mg/3 mL nebulizer solution  Commonly known as: DUO-NEB  Take 3 mLs by nebulization every 6 (six) hours. Rescue     ARTIFICIAL TEARS([id number]) ophthalmic solution  Generic drug: dextran 70-hypromellose  Place 1 drop into both eyes 4 (four) times daily as needed.     cefTRIAXone 1 gram injection  Commonly known as: ROCEPHIN  Inject 1 g into the muscle daily as needed.     CLEAR EYES NATURAL TEARS 0.5-0.6 % Drop  Generic drug: polyvinyl alcohol-povidone  Apply 1 drop to eye daily as needed.     cycloSPORINE 0.05 % ophthalmic emulsion  Commonly known as: RESTASIS  1 drop 2 (two) times daily. 0900  2100     ergocalciferol 50,000 unit Cap  Commonly known as: ERGOCALCIFEROL  Take 50,000 Units by mouth every Saturday.     ferrous sulfate 300 mg (60 mg iron)/5 mL syrup  Take 300 mg by mouth As instructed. Every other day     ISOSOURCE ORAL  1.5 mg by Per G Tube route continuous. 1.5 per peg tube at 50ml/hr continuously     XIOMARA ORAL  1 Package by PEG Tube route 2 (two) times a day. In 8oz of water     LINZESS 145 mcg Cap capsule  Generic drug: linaCLOtide  Take 145 mcg by mouth before breakfast.     ondansetron 4 MG  tablet  Commonly known as: ZOFRAN  4 mg by Per G Tube route every 8 (eight) hours as needed for Nausea.     polyethylene glycol 17 gram Pwpk  Commonly known as: GLYCOLAX  Take 17 g by mouth daily as needed.            Indwelling Lines/Drains at time of discharge:   Lines/Drains/Airways     Drain  Duration                Biliary Tube RUQ -- days         Gastrostomy/Enterostomy LUQ -- days         Biliary Tube 01/13/22 543 days         Urethral Catheter 07/09/23 1104 16 Fr. 1 day          Airway  Duration                Surgical Airway Portex Cuffed -- days    Adult Surgical Airway Shiley Cuffed 8.0 -- days                Time spent on the discharge of patient: 37 minutes         Lanre Goss MD  Department of Hospital Medicine  [facility]

## 2023-07-10 NOTE — SUBJECTIVE & OBJECTIVE
Interval History:  not much change in status.  Vitals stable.  Continues on meropenem for UTI.    Review of Systems   Unable to perform ROS: Patient unresponsive   Objective:     Vital Signs (Most Recent):  Temp: 97.3 °F (36.3 °C) (07/09/23 1908)  Pulse: 72 (07/09/23 2000)  Resp: (!) 26 (07/09/23 2000)  BP: (!) 99/55 (07/09/23 2000)  SpO2: 96 % (07/09/23 2000) Vital Signs (24h Range):  Temp:  [95 °F (35 °C)-97.3 °F (36.3 °C)] 97.3 °F (36.3 °C)  Pulse:  [49-81] 72  Resp:  [11-40] 26  SpO2:  [92 %-98 %] 96 %  BP: ()/(55-78) 99/55     Weight: 88.6 kg (195 lb 5.2 oz)  Body mass index is 38.15 kg/m².    Intake/Output Summary (Last 24 hours) at 7/9/2023 2105  Last data filed at 7/9/2023 1832  Gross per 24 hour   Intake 1901 ml   Output 1925 ml   Net -24 ml           Physical Exam  Constitutional:       Interventions: She is intubated.   Eyes:      Conjunctiva/sclera:      Right eye: Right conjunctiva is not injected. No exudate.     Left eye: Left conjunctiva is not injected. No exudate.  Neck:      Vascular: No JVD.      Trachea: Tracheostomy present.   Cardiovascular:      Rate and Rhythm: Normal rate and regular rhythm.   Pulmonary:      Effort: She is intubated.      Breath sounds: Rhonchi present.      Comments: Trach connected to ventilator  Abdominal:      General: Bowel sounds are decreased. There is distension.      Palpations: Abdomen is soft.   Musculoskeletal:      Right lower leg: Edema present.      Left lower leg: Edema present.   Skin:     Comments: Stage 3 decubitus on sacrum   Neurological:      Comments: Eyes open.  Doesn't respond.           Significant Labs: All pertinent labs within the past 24 hours have been reviewed.    Significant Imaging: I have reviewed all pertinent imaging results/findings within the past 24 hours.

## 2023-07-10 NOTE — DISCHARGE INSTRUCTIONS
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]  Not Applicable    Persistent vegetative state [R40.3]  Yes    Anemia of chronic disease [D63.8]  Yes      Resolved Hospital Problems    Diagnosis Date Resolved POA    *Hypothermia [T68.XXXA] 07/09/2023 Yes    Hypokalemia [E87.6] 07/10/2023 Yes     Allergies: Review of patient's allergies indicates:  No Known Allergies    Code Status: DNR    Vitals: Routine    Activity: Activity as tolerated    Nursing Precautions: Aspiration  and Fall      Pending Diagnostic Studies:       None            Miscellaneous Care:   PEG Care:  Clean site every 24 hours  Medications: Discontinue all previous medication orders, if any. See new list below.  Current Discharge Medication List        START taking these medications    Details   gentamicin (GARAMYCIN) 0.3 % ophthalmic solution Place 1 drop into both eyes 4 (four) times daily. Reason:  superficial bacterial ocular infection for 10 days      MEROPENEM 1 G/100 ML NS, READY TO MIX SYSTEM, Inject 100 mLs (1 g total) into the vein every 8 (eight) hours. Reason:  ESBL Proteus urinary tract infection for 4 days  Qty: 1200 mL, Refills: 0      miconazole (MICOTIN) 2 % cream Apply topically 2 (two) times daily. Reason:  Tinea pedis for 14 days  Refills: 0           CONTINUE these medications which have NOT CHANGED    Details   acetylcysteine 100 mg/ml, 10%, (MUCOMYST) 100 mg/mL (10 %) nebulizer solution Take 4 mLs by nebulization  every 8 (eight) hours.  Refills: 12      arginine/glutamine/calcium bmb (XIOMARA ORAL) 1 Package by PEG Tube route 2 (two) times a day. In 8oz of water      cefTRIAXone (ROCEPHIN) 1 gram injection Inject 1 g into the muscle daily as needed.      cycloSPORINE (RESTASIS) 0.05 % ophthalmic emulsion 1 drop 2 (two) times daily. 0900  2100      dextran 70-hypromellose (ARTIFICIAL TEARS,[id number],) ophthalmic solution Place 1 drop into both eyes 4 (four) times daily as needed.      ferrous sulfate 300 mg (60 mg iron)/5 mL syrup Take 300 mg by mouth As instructed. Every other day      lactose-reduced food (ISOSOURCE ORAL) 1.5 mg by Per G Tube route continuous. 1.5 per peg tube at 50ml/hr continuously      linaCLOtide (LINZESS) 145 mcg Cap capsule Take 145 mcg by mouth before breakfast.      midodrine (PROAMATINE) 10 MG tablet 1 tablet (10 mg total) by Per G Tube route 3 (three) times daily.  Qty: 90 tablet, Refills: 11      polyvinyl alcohol-povidone (CLEAR EYES NATURAL TEARS) 0.5-0.6 % Drop Apply 1 drop to eye daily as needed.      acetaminophen (TYLENOL) 325 MG tablet 2 tablets (650 mg total) by Per G Tube route every 4 (four) hours as needed for Pain or Temperature greater than (100.4F).  Refills: 0      albuterol-ipratropium (DUO-NEB) 2.5 mg-0.5 mg/3 mL nebulizer solution Take 3 mLs by nebulization every 6 (six) hours. Rescue  Qty: 1 Box, Refills: 0      ergocalciferol (ERGOCALCIFEROL) 50,000 unit Cap Take 50,000 Units by mouth every Saturday.      ondansetron (ZOFRAN) 4 MG tablet 4 mg by Per G Tube route every 8 (eight) hours as needed for Nausea.      polyethylene glycol (GLYCOLAX) 17 gram PwPk Take 17 g by mouth daily as needed.

## 2023-07-10 NOTE — PLAN OF CARE
07/10/23 0938   Discharge Reassessment   Assessment Type Discharge Planning Reassessment   Did the patient's condition or plan change since previous assessment? No   Communicated [place] with patient/caregiver Yes   Discharge Plan A Return to nursing home   Discharge Plan B Return to Nursing Home     Updated clinicals sent to [facility], patient's alf nursing home, to notify of anticipated transfer back to complete IV abx

## 2023-07-10 NOTE — ASSESSMENT & PLAN NOTE
Supplement potassium and monitor level.    Potassium   Date Value Ref Range Status   07/09/2023 3.6 3.5 - 5.1 mmol/L Final

## 2023-07-10 NOTE — NURSING
EMS here to transport patient back to [facility], pt discharged with carroll intact and left midline. Pt transported on EMS vent with no signs of distress.

## 2023-07-10 NOTE — HOSPITAL COURSE
She was admitted with the hypothermia, which was presumed to be secondary to infection.  Antibiotics were administered.  The urine sample showed that she had a catheter-associated UTI.  Infectious disease consultant was brought on the case.  Patient's core temperature improved.  A low random cortisol level was followed up with a Cosyntropin stimulation test, which was normal.  She didn't have adrenal insufficiency.  Urine grew Proteus, which had ESBL.  While admitted, we treated her for the stage 3 pressure ulcer on the sacrum.  We also switched the tracheostomy to a #8 because of issues with the #6.  She was eventually discharged back to [facility].  Dr. Perez recommended four more days of meropenem.    Physical Exam  Constitutional:       Interventions: She is intubated.   Eyes:      Conjunctiva/sclera:      Right eye: Right conjunctiva is not injected. No exudate.     Left eye: Left conjunctiva is not injected. No exudate.  Neck:      Vascular: No JVD.      Trachea: Tracheostomy present.   Cardiovascular:      Rate and Rhythm: Normal rate and regular rhythm.   Pulmonary:      Effort: She is intubated.      Breath sounds: Rhonchi present.      Comments: Trach connected to ventilator  Abdominal:      General: Bowel sounds are decreased. There is distension.      Palpations: Abdomen is soft.

## 2023-07-10 NOTE — CARE UPDATE
07/09/23 2015   Patient Assessment/Suction   Level of Consciousness (AVPU) responds to pain   Respiratory Effort Normal;Unlabored   Expansion/Accessory Muscles/Retractions no use of accessory muscles   All Lung Fields Breath Sounds equal bilaterally;coarse   Rhythm/Pattern, Respiratory assisted mechanically   Cough Frequency with stimulation   Cough Type assisted   Suction Method tracheal   $ Suction Charges Inline Suction Procedure Stat Charge   Secretions Amount small   Secretions Color cloudy;white   Secretions Characteristics thin   Skin Integrity   $ Wound Care Tech Time 15 min   Area Observed Neck under tracheostomy   Skin Appearance without discoloration   Barrier used? Other (see comments)   Barrier Changed? Yes   PRE-TX-O2   Device (Oxygen Therapy) ventilator   Oxygen Concentration (%) 50   SpO2 96 %   Pulse Oximetry Type Continuous   $ Pulse Oximetry - Multiple Charge Pulse Oximetry - Multiple   Pulse 74   Resp (!) 38   Vent Select   Conventional Vent Y   Charged w/in last 24h YES   Preset Conventional Ventilator Settings   Vent ID [id number]   Vent Type    Ventilation Type VC   Vent Mode A/C   Humidity HME   Set Rate 20 BPM   Vt Set 400 mL   PEEP/CPAP 8 cmH20   Peak Flow 55 L/min   Peak End Inspiratory Pressure 30 cmH20   I-Trigger Type  V-TRIG   Trigger Sensitivity Flow/I-Trigger 3 L/min   Patient Ventilator Parameters   Resp Rate Total 24 br/min   Peak Airway Pressure 31 cmH20   Mean Airway Pressure 16 cmH20   Plateau Pressure 23 cmH20   Exhaled Vt 431 mL   Total Ve 10.3 L/m   I:E Ratio Measured 1:2.60   Auto PEEP 0 cmH20   Conventional Ventilator Alarms   Alarms On Y   Resp Rate High Alarm 45 br/min   Press High Alarm 60 cmH2O   Apnea Rate 10   Apnea Volume (mL) 0 mL   Apnea Oxygen Concentration  100   Apnea Flow Rate (L/min) 63   T Apnea 20 sec(s)   IHI Ventilator Associated Pneumonia Bundle (Required)   Oral Care Mouth suctioned   Ready to Wean/Extubation Screen   FIO2<=50 (chart decimal) 0.5    MV<16L (chart vol.) 10.3   PEEP <=8 (chart #) 8   Ready to Wean Parameters   F/VT Ratio<105 (RSBI) (!) 88.17   Vital Capacity   Vital Capacity (mL) 0

## 2023-07-10 NOTE — ASSESSMENT & PLAN NOTE
Patient's anemia is currently controlled. Has not received any PRBCs to date.. Etiology likely d/t chronic disease.  Current CBC reviewed-   Lab Results   Component Value Date    HGB 7.4 (L) 07/09/2023    HCT 25.2 (L) 07/09/2023     Monitor serial CBC and transfuse if patient becomes hemodynamically unstable, symptomatic or H/H drops below 7/21.

## 2023-07-10 NOTE — PLAN OF CARE
07/10/23 1118   Final Note   Assessment Type Final Discharge Note   Anticipated Discharge Disposition Int Care Fac   What phone number can be called within the next 1-3 days to see how you are doing after discharge? [phone]   Hospital Resources/Appts/Education Provided Post-Acute resouces added to AVS   Post-Acute Status   Post-Acute Authorization Placement   Post-Acute Placement Status Set-up Complete/Auth obtained   Discharge Delays (!) Ambulance Transport/Facility Transport     Discharge orders and chart reviewed. No other discharge needs noted at this time. Pt is clear for discharge from case management. [facility].    Report to be called to Patricia at [phone]. Per She, she will set up EMS transport.

## 2023-07-11 LAB — BACTERIA BLD CULT: NORMAL

## 2023-07-12 LAB
BACTERIA UR CULT: ABNORMAL
BACTERIA UR CULT: ABNORMAL

## 2023-07-13 LAB — BACTERIA BLD CULT: NORMAL

## 2023-07-22 ENCOUNTER — HOSPITAL ENCOUNTER (INPATIENT)
Facility: HOSPITAL | Age: 63
LOS: 10 days | DRG: 870 | End: 2023-08-01
Attending: EMERGENCY MEDICINE | Admitting: FAMILY MEDICINE
Payer: MEDICARE

## 2023-07-22 DIAGNOSIS — D63.8 ANEMIA OF CHRONIC DISEASE: ICD-10-CM

## 2023-07-22 DIAGNOSIS — J96.01 ACUTE RESPIRATORY FAILURE WITH HYPOXIA: ICD-10-CM

## 2023-07-22 DIAGNOSIS — N30.00 ACUTE CYSTITIS WITHOUT HEMATURIA: ICD-10-CM

## 2023-07-22 DIAGNOSIS — R40.3 PERSISTENT VEGETATIVE STATE: ICD-10-CM

## 2023-07-22 DIAGNOSIS — R06.03 RESPIRATORY DISTRESS: ICD-10-CM

## 2023-07-22 DIAGNOSIS — J18.9 PNEUMONIA OF BOTH LUNGS DUE TO INFECTIOUS ORGANISM, UNSPECIFIED PART OF LUNG: Primary | ICD-10-CM

## 2023-07-22 DIAGNOSIS — I47.10 SVT (SUPRAVENTRICULAR TACHYCARDIA): ICD-10-CM

## 2023-07-22 DIAGNOSIS — R07.9 ACUTE CHEST PAIN: ICD-10-CM

## 2023-07-22 DIAGNOSIS — R09.02 HYPOXEMIA: ICD-10-CM

## 2023-07-22 DIAGNOSIS — I10 ESSENTIAL HYPERTENSION: ICD-10-CM

## 2023-07-22 DIAGNOSIS — I47.10 PAROXYSMAL SVT (SUPRAVENTRICULAR TACHYCARDIA): ICD-10-CM

## 2023-07-22 DIAGNOSIS — J18.9 PNEUMONIA OF BOTH LOWER LOBES DUE TO INFECTIOUS ORGANISM: ICD-10-CM

## 2023-07-22 DIAGNOSIS — J96.20 ACUTE ON CHRONIC RESPIRATORY FAILURE: ICD-10-CM

## 2023-07-22 DIAGNOSIS — R09.02 HYPOXIA: ICD-10-CM

## 2023-07-22 DIAGNOSIS — J96.90 RESPIRATORY FAILURE: ICD-10-CM

## 2023-07-22 DIAGNOSIS — L89.302 PRESSURE INJURY OF BUTTOCK, STAGE 2, UNSPECIFIED LATERALITY: ICD-10-CM

## 2023-07-22 DIAGNOSIS — Z99.11 VENTILATOR DEPENDENCE: ICD-10-CM

## 2023-07-22 PROBLEM — R82.90 URINE FINDING: Status: ACTIVE | Noted: 2023-07-22

## 2023-07-22 LAB
ALBUMIN SERPL BCP-MCNC: 3.7 G/DL (ref 3.5–5.2)
ALLENS TEST: ABNORMAL
ALP SERPL-CCNC: 187 U/L (ref 55–135)
ALT SERPL W/O P-5'-P-CCNC: 35 U/L (ref 10–44)
ANION GAP SERPL CALC-SCNC: 8 MMOL/L (ref 8–16)
AST SERPL-CCNC: 32 U/L (ref 10–40)
BASOPHILS # BLD AUTO: 0.02 K/UL (ref 0–0.2)
BASOPHILS NFR BLD: 0.3 % (ref 0–1.9)
BILIRUB SERPL-MCNC: 0.7 MG/DL (ref 0.1–1)
BNP SERPL-MCNC: 127 PG/ML (ref 0–99)
BUN SERPL-MCNC: 32 MG/DL (ref 6–20)
CALCIUM SERPL-MCNC: 9.3 MG/DL (ref 8.7–10.5)
CHLORIDE SERPL-SCNC: 102 MMOL/L (ref 95–110)
CO2 SERPL-SCNC: 29 MMOL/L (ref 23–29)
CREAT SERPL-MCNC: 0.3 MG/DL (ref 0.5–1.4)
CREAT SERPL-MCNC: <0.3 MG/DL (ref 0.5–1.4)
DELSYS: ABNORMAL
DIFFERENTIAL METHOD: ABNORMAL
EOSINOPHIL # BLD AUTO: 0.4 K/UL (ref 0–0.5)
EOSINOPHIL NFR BLD: 4.7 % (ref 0–8)
ERYTHROCYTE [DISTWIDTH] IN BLOOD BY AUTOMATED COUNT: 21.2 % (ref 11.5–14.5)
ERYTHROCYTE [SEDIMENTATION RATE] IN BLOOD BY WESTERGREN METHOD: 16 MM/H
EST. GFR  (NO RACE VARIABLE): >60 ML/MIN/1.73 M^2
FIO2: 80
GLUCOSE SERPL-MCNC: 85 MG/DL (ref 70–110)
GLUCOSE SERPL-MCNC: 89 MG/DL (ref 70–110)
GLUCOSE SERPL-MCNC: 93 MG/DL (ref 70–110)
HCO3 UR-SCNC: 31.3 MMOL/L (ref 24–28)
HCT VFR BLD AUTO: 33.5 % (ref 37–48.5)
HCT VFR BLD CALC: 34 %PCV (ref 36–54)
HGB BLD-MCNC: 9.4 G/DL (ref 12–16)
IMM GRANULOCYTES # BLD AUTO: 0.03 K/UL (ref 0–0.04)
IMM GRANULOCYTES NFR BLD AUTO: 0.4 % (ref 0–0.5)
INFLUENZA A, MOLECULAR: NEGATIVE
INFLUENZA B, MOLECULAR: NEGATIVE
LACTATE SERPL-SCNC: 1.7 MMOL/L (ref 0.5–1.9)
LYMPHOCYTES # BLD AUTO: 1.5 K/UL (ref 1–4.8)
LYMPHOCYTES NFR BLD: 19.1 % (ref 18–48)
MCH RBC QN AUTO: 26.3 PG (ref 27–31)
MCHC RBC AUTO-ENTMCNC: 28.1 G/DL (ref 32–36)
MCV RBC AUTO: 94 FL (ref 82–98)
MIN VOL: 17
MODE: ABNORMAL
MONOCYTES # BLD AUTO: 0.8 K/UL (ref 0.3–1)
MONOCYTES NFR BLD: 10.7 % (ref 4–15)
NEUTROPHILS # BLD AUTO: 5.1 K/UL (ref 1.8–7.7)
NEUTROPHILS NFR BLD: 64.8 % (ref 38–73)
NRBC BLD-RTO: 1 /100 WBC
PCO2 BLDA: 57.3 MMHG (ref 35–45)
PEEP: 10
PH SMN: 7.35 [PH] (ref 7.35–7.45)
PIP: 42
PLATELET # BLD AUTO: 224 K/UL (ref 150–450)
PMV BLD AUTO: 12.2 FL (ref 9.2–12.9)
PO2 BLDA: 69 MMHG (ref 80–100)
POC BE: 6 MMOL/L
POC IONIZED CALCIUM: 1.29 MMOL/L (ref 1.06–1.42)
POC SATURATED O2: 92 % (ref 95–100)
POC TCO2: 33 MMOL/L (ref 23–27)
POTASSIUM BLD-SCNC: 4.7 MMOL/L (ref 3.5–5.1)
POTASSIUM SERPL-SCNC: 4.5 MMOL/L (ref 3.5–5.1)
PROCALCITONIN SERPL IA-MCNC: 0.13 NG/ML (ref 0–0.5)
PROT SERPL-MCNC: 10.3 G/DL (ref 6–8.4)
RBC # BLD AUTO: 3.58 M/UL (ref 4–5.4)
SAMPLE: ABNORMAL
SAMPLE: ABNORMAL
SARS-COV-2 RDRP RESP QL NAA+PROBE: NEGATIVE
SITE: ABNORMAL
SODIUM BLD-SCNC: 142 MMOL/L (ref 136–145)
SODIUM SERPL-SCNC: 139 MMOL/L (ref 136–145)
SP02: 93
SPECIMEN SOURCE: NORMAL
TROPONIN I SERPL HS-MCNC: 40 PG/ML (ref 0–14.9)
VT: 360
WBC # BLD AUTO: 7.82 K/UL (ref 3.9–12.7)

## 2023-07-22 PROCEDURE — 80053 COMPREHEN METABOLIC PANEL: CPT | Performed by: EMERGENCY MEDICINE

## 2023-07-22 PROCEDURE — 94640 AIRWAY INHALATION TREATMENT: CPT

## 2023-07-22 PROCEDURE — 83605 ASSAY OF LACTIC ACID: CPT | Performed by: EMERGENCY MEDICINE

## 2023-07-22 PROCEDURE — 82565 ASSAY OF CREATININE: CPT

## 2023-07-22 PROCEDURE — 96374 THER/PROPH/DIAG INJ IV PUSH: CPT

## 2023-07-22 PROCEDURE — 85025 COMPLETE CBC W/AUTO DIFF WBC: CPT | Performed by: EMERGENCY MEDICINE

## 2023-07-22 PROCEDURE — 25000003 PHARM REV CODE 250: Performed by: EMERGENCY MEDICINE

## 2023-07-22 PROCEDURE — 99291 CRITICAL CARE FIRST HOUR: CPT

## 2023-07-22 PROCEDURE — 99900026 HC AIRWAY MAINTENANCE (STAT)

## 2023-07-22 PROCEDURE — 83880 ASSAY OF NATRIURETIC PEPTIDE: CPT | Performed by: EMERGENCY MEDICINE

## 2023-07-22 PROCEDURE — 82803 BLOOD GASES ANY COMBINATION: CPT

## 2023-07-22 PROCEDURE — 93010 ELECTROCARDIOGRAM REPORT: CPT | Mod: ,,, | Performed by: SPECIALIST

## 2023-07-22 PROCEDURE — 84145 PROCALCITONIN (PCT): CPT | Performed by: EMERGENCY MEDICINE

## 2023-07-22 PROCEDURE — 82962 GLUCOSE BLOOD TEST: CPT

## 2023-07-22 PROCEDURE — 84132 ASSAY OF SERUM POTASSIUM: CPT

## 2023-07-22 PROCEDURE — 87040 BLOOD CULTURE FOR BACTERIA: CPT | Performed by: EMERGENCY MEDICINE

## 2023-07-22 PROCEDURE — 84295 ASSAY OF SERUM SODIUM: CPT

## 2023-07-22 PROCEDURE — 96375 TX/PRO/DX INJ NEW DRUG ADDON: CPT

## 2023-07-22 PROCEDURE — 94761 N-INVAS EAR/PLS OXIMETRY MLT: CPT

## 2023-07-22 PROCEDURE — 93005 ELECTROCARDIOGRAM TRACING: CPT | Performed by: SPECIALIST

## 2023-07-22 PROCEDURE — 82330 ASSAY OF CALCIUM: CPT

## 2023-07-22 PROCEDURE — 94002 VENT MGMT INPAT INIT DAY: CPT

## 2023-07-22 PROCEDURE — 63600175 PHARM REV CODE 636 W HCPCS: Performed by: EMERGENCY MEDICINE

## 2023-07-22 PROCEDURE — 85014 HEMATOCRIT: CPT

## 2023-07-22 PROCEDURE — U0002 COVID-19 LAB TEST NON-CDC: HCPCS | Performed by: EMERGENCY MEDICINE

## 2023-07-22 PROCEDURE — 25000242 PHARM REV CODE 250 ALT 637 W/ HCPCS: Performed by: EMERGENCY MEDICINE

## 2023-07-22 PROCEDURE — 84484 ASSAY OF TROPONIN QUANT: CPT | Performed by: EMERGENCY MEDICINE

## 2023-07-22 PROCEDURE — 36600 WITHDRAWAL OF ARTERIAL BLOOD: CPT

## 2023-07-22 PROCEDURE — 87502 INFLUENZA DNA AMP PROBE: CPT | Performed by: EMERGENCY MEDICINE

## 2023-07-22 PROCEDURE — 25500020 PHARM REV CODE 255: Performed by: EMERGENCY MEDICINE

## 2023-07-22 PROCEDURE — 99900035 HC TECH TIME PER 15 MIN (STAT)

## 2023-07-22 PROCEDURE — 93010 EKG 12-LEAD: ICD-10-PCS | Mod: ,,, | Performed by: SPECIALIST

## 2023-07-22 PROCEDURE — 20000000 HC ICU ROOM

## 2023-07-22 RX ORDER — VANCOMYCIN HCL IN 5 % DEXTROSE 1G/250ML
2000 PLASTIC BAG, INJECTION (ML) INTRAVENOUS ONCE
Status: COMPLETED | OUTPATIENT
Start: 2023-07-22 | End: 2023-07-23

## 2023-07-22 RX ORDER — LEVALBUTEROL INHALATION SOLUTION 1.25 MG/3ML
1.25 SOLUTION RESPIRATORY (INHALATION)
Status: COMPLETED | OUTPATIENT
Start: 2023-07-22 | End: 2023-07-22

## 2023-07-22 RX ORDER — DEXAMETHASONE SODIUM PHOSPHATE 4 MG/ML
6 INJECTION, SOLUTION INTRA-ARTICULAR; INTRALESIONAL; INTRAMUSCULAR; INTRAVENOUS; SOFT TISSUE
Status: COMPLETED | OUTPATIENT
Start: 2023-07-22 | End: 2023-07-22

## 2023-07-22 RX ADMIN — DEXAMETHASONE SODIUM PHOSPHATE 6 MG: 4 INJECTION, SOLUTION INTRA-ARTICULAR; INTRALESIONAL; INTRAMUSCULAR; INTRAVENOUS; SOFT TISSUE at 09:07

## 2023-07-22 RX ADMIN — MEROPENEM 2 G: 1 INJECTION, POWDER, FOR SOLUTION INTRAVENOUS at 09:07

## 2023-07-22 RX ADMIN — IOHEXOL 100 ML: 350 INJECTION, SOLUTION INTRAVENOUS at 09:07

## 2023-07-22 RX ADMIN — LEVALBUTEROL HYDROCHLORIDE 1.25 MG: 1.25 SOLUTION RESPIRATORY (INHALATION) at 08:07

## 2023-07-23 PROBLEM — R09.02 HYPOXIA: Status: ACTIVE | Noted: 2023-07-23

## 2023-07-23 LAB
ALBUMIN SERPL BCP-MCNC: 3.4 G/DL (ref 3.5–5.2)
ALLENS TEST: ABNORMAL
ALP SERPL-CCNC: 157 U/L (ref 55–135)
ALT SERPL W/O P-5'-P-CCNC: 35 U/L (ref 10–44)
ANION GAP SERPL CALC-SCNC: 8 MMOL/L (ref 8–16)
AST SERPL-CCNC: 31 U/L (ref 10–40)
BACTERIA #/AREA URNS HPF: ABNORMAL /HPF
BASOPHILS # BLD AUTO: 0.01 K/UL (ref 0–0.2)
BASOPHILS NFR BLD: 0.1 % (ref 0–1.9)
BILIRUB SERPL-MCNC: 1.3 MG/DL (ref 0.1–1)
BILIRUB UR QL STRIP: NEGATIVE
BUN SERPL-MCNC: 29 MG/DL (ref 6–20)
CALCIUM SERPL-MCNC: 9.4 MG/DL (ref 8.7–10.5)
CHLORIDE SERPL-SCNC: 104 MMOL/L (ref 95–110)
CLARITY UR: ABNORMAL
CO2 SERPL-SCNC: 27 MMOL/L (ref 23–29)
COLOR UR: YELLOW
CREAT SERPL-MCNC: 0.3 MG/DL (ref 0.5–1.4)
DELSYS: ABNORMAL
DIFFERENTIAL METHOD: ABNORMAL
EOSINOPHIL # BLD AUTO: 0 K/UL (ref 0–0.5)
EOSINOPHIL NFR BLD: 0.2 % (ref 0–8)
ERYTHROCYTE [DISTWIDTH] IN BLOOD BY AUTOMATED COUNT: 20.9 % (ref 11.5–14.5)
ERYTHROCYTE [SEDIMENTATION RATE] IN BLOOD BY WESTERGREN METHOD: 16 MM/H
EST. GFR  (NO RACE VARIABLE): >60 ML/MIN/1.73 M^2
FIO2: 70
GLUCOSE SERPL-MCNC: 165 MG/DL (ref 70–110)
GLUCOSE UR QL STRIP: NEGATIVE
HCO3 UR-SCNC: 26.7 MMOL/L (ref 24–28)
HCT VFR BLD AUTO: 29.9 % (ref 37–48.5)
HGB BLD-MCNC: 8.7 G/DL (ref 12–16)
HGB UR QL STRIP: ABNORMAL
HYALINE CASTS #/AREA URNS LPF: 81 /LPF
IMM GRANULOCYTES # BLD AUTO: 0.02 K/UL (ref 0–0.04)
IMM GRANULOCYTES NFR BLD AUTO: 0.2 % (ref 0–0.5)
KETONES UR QL STRIP: NEGATIVE
LACTATE SERPL-SCNC: 1.9 MMOL/L (ref 0.5–1.9)
LEUKOCYTE ESTERASE UR QL STRIP: ABNORMAL
LYMPHOCYTES # BLD AUTO: 0.6 K/UL (ref 1–4.8)
LYMPHOCYTES NFR BLD: 7.7 % (ref 18–48)
MAGNESIUM SERPL-MCNC: 1.8 MG/DL (ref 1.6–2.6)
MCH RBC QN AUTO: 26.4 PG (ref 27–31)
MCHC RBC AUTO-ENTMCNC: 29.1 G/DL (ref 32–36)
MCV RBC AUTO: 91 FL (ref 82–98)
MICROSCOPIC COMMENT: ABNORMAL
MIN VOL: 12.6
MODE: ABNORMAL
MONOCYTES # BLD AUTO: 0.1 K/UL (ref 0.3–1)
MONOCYTES NFR BLD: 1 % (ref 4–15)
MRSA SCREEN BY PCR: NEGATIVE
NEUTROPHILS # BLD AUTO: 7.6 K/UL (ref 1.8–7.7)
NEUTROPHILS NFR BLD: 90.8 % (ref 38–73)
NITRITE UR QL STRIP: NEGATIVE
NRBC BLD-RTO: 1 /100 WBC
PCO2 BLDA: 43.4 MMHG (ref 35–45)
PEEP: 5
PH SMN: 7.4 [PH] (ref 7.35–7.45)
PH UR STRIP: 7 [PH] (ref 5–8)
PHOSPHATE SERPL-MCNC: 3.8 MG/DL (ref 2.7–4.5)
PIP: 26
PLATELET # BLD AUTO: 181 K/UL (ref 150–450)
PMV BLD AUTO: 11.8 FL (ref 9.2–12.9)
PO2 BLDA: 67 MMHG (ref 80–100)
POC BE: 2 MMOL/L
POC SATURATED O2: 93 % (ref 95–100)
POC TCO2: 28 MMOL/L (ref 23–27)
POTASSIUM SERPL-SCNC: 5 MMOL/L (ref 3.5–5.1)
PROT SERPL-MCNC: 9.4 G/DL (ref 6–8.4)
PROT UR QL STRIP: ABNORMAL
RBC # BLD AUTO: 3.29 M/UL (ref 4–5.4)
RBC #/AREA URNS HPF: 65 /HPF (ref 0–4)
SAMPLE: ABNORMAL
SITE: ABNORMAL
SODIUM SERPL-SCNC: 139 MMOL/L (ref 136–145)
SP GR UR STRIP: 1.01 (ref 1–1.03)
SP02: 93
SQUAMOUS #/AREA URNS HPF: 2 /HPF
TSH SERPL DL<=0.005 MIU/L-ACNC: 1.88 UIU/ML (ref 0.34–5.6)
URN SPEC COLLECT METH UR: ABNORMAL
UROBILINOGEN UR STRIP-ACNC: NEGATIVE EU/DL
VT: 360
WBC # BLD AUTO: 8.36 K/UL (ref 3.9–12.7)
WBC #/AREA URNS HPF: >100 /HPF (ref 0–5)

## 2023-07-23 PROCEDURE — 94799 UNLISTED PULMONARY SVC/PX: CPT

## 2023-07-23 PROCEDURE — 99900035 HC TECH TIME PER 15 MIN (STAT)

## 2023-07-23 PROCEDURE — 36415 COLL VENOUS BLD VENIPUNCTURE: CPT | Performed by: EMERGENCY MEDICINE

## 2023-07-23 PROCEDURE — 25000242 PHARM REV CODE 250 ALT 637 W/ HCPCS

## 2023-07-23 PROCEDURE — 27000221 HC OXYGEN, UP TO 24 HOURS

## 2023-07-23 PROCEDURE — 81001 URINALYSIS AUTO W/SCOPE: CPT | Performed by: EMERGENCY MEDICINE

## 2023-07-23 PROCEDURE — 99900031 HC PATIENT EDUCATION (STAT)

## 2023-07-23 PROCEDURE — 99900026 HC AIRWAY MAINTENANCE (STAT)

## 2023-07-23 PROCEDURE — 94640 AIRWAY INHALATION TREATMENT: CPT

## 2023-07-23 PROCEDURE — 85025 COMPLETE CBC W/AUTO DIFF WBC: CPT | Performed by: FAMILY MEDICINE

## 2023-07-23 PROCEDURE — 99223 1ST HOSP IP/OBS HIGH 75: CPT | Mod: ,,, | Performed by: INTERNAL MEDICINE

## 2023-07-23 PROCEDURE — 87086 URINE CULTURE/COLONY COUNT: CPT | Performed by: EMERGENCY MEDICINE

## 2023-07-23 PROCEDURE — 99291 CRITICAL CARE FIRST HOUR: CPT | Mod: ,,, | Performed by: INTERNAL MEDICINE

## 2023-07-23 PROCEDURE — 36600 WITHDRAWAL OF ARTERIAL BLOOD: CPT

## 2023-07-23 PROCEDURE — 63600175 PHARM REV CODE 636 W HCPCS: Performed by: EMERGENCY MEDICINE

## 2023-07-23 PROCEDURE — 25000003 PHARM REV CODE 250: Performed by: FAMILY MEDICINE

## 2023-07-23 PROCEDURE — 20000000 HC ICU ROOM

## 2023-07-23 PROCEDURE — 83735 ASSAY OF MAGNESIUM: CPT | Performed by: FAMILY MEDICINE

## 2023-07-23 PROCEDURE — 63600175 PHARM REV CODE 636 W HCPCS: Performed by: FAMILY MEDICINE

## 2023-07-23 PROCEDURE — 94003 VENT MGMT INPAT SUBQ DAY: CPT

## 2023-07-23 PROCEDURE — 80053 COMPREHEN METABOLIC PANEL: CPT | Performed by: FAMILY MEDICINE

## 2023-07-23 PROCEDURE — 84100 ASSAY OF PHOSPHORUS: CPT | Performed by: FAMILY MEDICINE

## 2023-07-23 PROCEDURE — 84443 ASSAY THYROID STIM HORMONE: CPT | Performed by: FAMILY MEDICINE

## 2023-07-23 PROCEDURE — 83605 ASSAY OF LACTIC ACID: CPT | Performed by: EMERGENCY MEDICINE

## 2023-07-23 PROCEDURE — 25000003 PHARM REV CODE 250: Performed by: EMERGENCY MEDICINE

## 2023-07-23 PROCEDURE — 94761 N-INVAS EAR/PLS OXIMETRY MLT: CPT

## 2023-07-23 PROCEDURE — 36415 COLL VENOUS BLD VENIPUNCTURE: CPT | Performed by: FAMILY MEDICINE

## 2023-07-23 PROCEDURE — 25000242 PHARM REV CODE 250 ALT 637 W/ HCPCS: Performed by: FAMILY MEDICINE

## 2023-07-23 PROCEDURE — 81003 URINALYSIS AUTO W/O SCOPE: CPT | Performed by: EMERGENCY MEDICINE

## 2023-07-23 PROCEDURE — 99223 PR INITIAL HOSPITAL CARE,LEVL III: ICD-10-PCS | Mod: ,,, | Performed by: INTERNAL MEDICINE

## 2023-07-23 PROCEDURE — 99291 PR CRITICAL CARE, E/M 30-74 MINUTES: ICD-10-PCS | Mod: ,,, | Performed by: INTERNAL MEDICINE

## 2023-07-23 PROCEDURE — 87641 MR-STAPH DNA AMP PROBE: CPT | Performed by: INTERNAL MEDICINE

## 2023-07-23 PROCEDURE — 82803 BLOOD GASES ANY COMBINATION: CPT

## 2023-07-23 RX ORDER — CALCIUM GLUCONATE 20 MG/ML
3 INJECTION, SOLUTION INTRAVENOUS
Status: DISCONTINUED | OUTPATIENT
Start: 2023-07-23 | End: 2023-08-01 | Stop reason: HOSPADM

## 2023-07-23 RX ORDER — IPRATROPIUM BROMIDE AND ALBUTEROL SULFATE 2.5; .5 MG/3ML; MG/3ML
3 SOLUTION RESPIRATORY (INHALATION) EVERY 6 HOURS
Status: DISCONTINUED | OUTPATIENT
Start: 2023-07-23 | End: 2023-07-27

## 2023-07-23 RX ORDER — CALCIUM GLUCONATE 20 MG/ML
2 INJECTION, SOLUTION INTRAVENOUS
Status: DISCONTINUED | OUTPATIENT
Start: 2023-07-23 | End: 2023-08-01 | Stop reason: HOSPADM

## 2023-07-23 RX ORDER — MAGNESIUM SULFATE HEPTAHYDRATE 40 MG/ML
2 INJECTION, SOLUTION INTRAVENOUS
Status: DISCONTINUED | OUTPATIENT
Start: 2023-07-23 | End: 2023-08-01 | Stop reason: HOSPADM

## 2023-07-23 RX ORDER — ACETYLCYSTEINE 200 MG/ML
2 SOLUTION ORAL; RESPIRATORY (INHALATION)
Status: DISCONTINUED | OUTPATIENT
Start: 2023-07-23 | End: 2023-07-28

## 2023-07-23 RX ORDER — CARBOXYMETHYLCELLULOSE SODIUM 5 MG/ML
1 SOLUTION/ DROPS OPHTHALMIC 4 TIMES DAILY
Status: DISCONTINUED | OUTPATIENT
Start: 2023-07-23 | End: 2023-08-01 | Stop reason: HOSPADM

## 2023-07-23 RX ORDER — SODIUM CHLORIDE 9 MG/ML
INJECTION, SOLUTION INTRAVENOUS CONTINUOUS
Status: DISCONTINUED | OUTPATIENT
Start: 2023-07-23 | End: 2023-07-26

## 2023-07-23 RX ORDER — ERGOCALCIFEROL (VITAMIN D2) 200 MCG/ML
4000 DROPS ORAL DAILY
Status: DISCONTINUED | OUTPATIENT
Start: 2023-07-23 | End: 2023-08-01 | Stop reason: HOSPADM

## 2023-07-23 RX ORDER — ENOXAPARIN SODIUM 100 MG/ML
40 INJECTION SUBCUTANEOUS EVERY 24 HOURS
Status: DISCONTINUED | OUTPATIENT
Start: 2023-07-23 | End: 2023-07-30

## 2023-07-23 RX ORDER — IPRATROPIUM BROMIDE AND ALBUTEROL SULFATE 2.5; .5 MG/3ML; MG/3ML
SOLUTION RESPIRATORY (INHALATION)
Status: COMPLETED
Start: 2023-07-23 | End: 2023-07-23

## 2023-07-23 RX ORDER — ONDANSETRON 4 MG/1
4 TABLET, ORALLY DISINTEGRATING ORAL EVERY 8 HOURS PRN
Status: DISCONTINUED | OUTPATIENT
Start: 2023-07-23 | End: 2023-08-01 | Stop reason: HOSPADM

## 2023-07-23 RX ORDER — SODIUM CHLORIDE 0.9 % (FLUSH) 0.9 %
10 SYRINGE (ML) INJECTION
Status: DISCONTINUED | OUTPATIENT
Start: 2023-07-23 | End: 2023-08-01 | Stop reason: HOSPADM

## 2023-07-23 RX ORDER — POTASSIUM CHLORIDE 7.45 MG/ML
40 INJECTION INTRAVENOUS
Status: DISCONTINUED | OUTPATIENT
Start: 2023-07-23 | End: 2023-08-01 | Stop reason: HOSPADM

## 2023-07-23 RX ORDER — MUPIROCIN 20 MG/G
OINTMENT TOPICAL 2 TIMES DAILY
Status: DISPENSED | OUTPATIENT
Start: 2023-07-23 | End: 2023-07-28

## 2023-07-23 RX ORDER — POTASSIUM CHLORIDE 7.45 MG/ML
80 INJECTION INTRAVENOUS
Status: DISCONTINUED | OUTPATIENT
Start: 2023-07-23 | End: 2023-08-01 | Stop reason: HOSPADM

## 2023-07-23 RX ORDER — MAGNESIUM SULFATE HEPTAHYDRATE 40 MG/ML
4 INJECTION, SOLUTION INTRAVENOUS
Status: DISCONTINUED | OUTPATIENT
Start: 2023-07-23 | End: 2023-08-01 | Stop reason: HOSPADM

## 2023-07-23 RX ORDER — CARBOXYMETHYLCELLULOSE SODIUM 10 MG/ML
1 GEL OPHTHALMIC 4 TIMES DAILY
Status: DISCONTINUED | OUTPATIENT
Start: 2023-07-23 | End: 2023-07-23

## 2023-07-23 RX ORDER — IPRATROPIUM BROMIDE AND ALBUTEROL SULFATE 2.5; .5 MG/3ML; MG/3ML
3 SOLUTION RESPIRATORY (INHALATION) EVERY 6 HOURS
Status: DISCONTINUED | OUTPATIENT
Start: 2023-07-23 | End: 2023-07-23

## 2023-07-23 RX ORDER — POTASSIUM CHLORIDE 7.45 MG/ML
60 INJECTION INTRAVENOUS
Status: DISCONTINUED | OUTPATIENT
Start: 2023-07-23 | End: 2023-08-01 | Stop reason: HOSPADM

## 2023-07-23 RX ORDER — POLYETHYLENE GLYCOL 3350 17 G/17G
17 POWDER, FOR SOLUTION ORAL DAILY PRN
Status: DISCONTINUED | OUTPATIENT
Start: 2023-07-23 | End: 2023-08-01 | Stop reason: HOSPADM

## 2023-07-23 RX ORDER — CALCIUM GLUCONATE 20 MG/ML
1 INJECTION, SOLUTION INTRAVENOUS
Status: DISCONTINUED | OUTPATIENT
Start: 2023-07-23 | End: 2023-08-01 | Stop reason: HOSPADM

## 2023-07-23 RX ORDER — DOXYLAMINE SUCCINATE 25 MG
TABLET ORAL 2 TIMES DAILY
Status: DISCONTINUED | OUTPATIENT
Start: 2023-07-23 | End: 2023-08-01 | Stop reason: HOSPADM

## 2023-07-23 RX ORDER — ACETYLCYSTEINE 200 MG/ML
2 SOLUTION ORAL; RESPIRATORY (INHALATION) 3 TIMES DAILY
Status: DISCONTINUED | OUTPATIENT
Start: 2023-07-23 | End: 2023-07-23

## 2023-07-23 RX ORDER — MIDODRINE HYDROCHLORIDE 2.5 MG/1
10 TABLET ORAL
Status: DISCONTINUED | OUTPATIENT
Start: 2023-07-23 | End: 2023-08-01 | Stop reason: HOSPADM

## 2023-07-23 RX ORDER — FERROUS SULFATE 300 MG/5ML
300 LIQUID (ML) ORAL EVERY OTHER DAY
Status: DISCONTINUED | OUTPATIENT
Start: 2023-07-23 | End: 2023-07-26

## 2023-07-23 RX ADMIN — MIDODRINE HYDROCHLORIDE 10 MG: 2.5 TABLET ORAL at 11:07

## 2023-07-23 RX ADMIN — VANCOMYCIN HYDROCHLORIDE 1500 MG: 1.5 INJECTION, POWDER, LYOPHILIZED, FOR SOLUTION INTRAVENOUS at 12:07

## 2023-07-23 RX ADMIN — ENOXAPARIN SODIUM 40 MG: 100 INJECTION SUBCUTANEOUS at 04:07

## 2023-07-23 RX ADMIN — MEROPENEM 1 G: 1 INJECTION, POWDER, FOR SOLUTION INTRAVENOUS at 09:07

## 2023-07-23 RX ADMIN — IPRATROPIUM BROMIDE AND ALBUTEROL SULFATE 3 ML: 2.5; .5 SOLUTION RESPIRATORY (INHALATION) at 12:07

## 2023-07-23 RX ADMIN — IPRATROPIUM BROMIDE AND ALBUTEROL SULFATE 3 ML: 2.5; .5 SOLUTION RESPIRATORY (INHALATION) at 07:07

## 2023-07-23 RX ADMIN — CARBOXYMETHYLCELLULOSE SODIUM 1 DROP: 5 SOLUTION/ DROPS OPHTHALMIC at 11:07

## 2023-07-23 RX ADMIN — IPRATROPIUM BROMIDE AND ALBUTEROL SULFATE 3 ML: 2.5; .5 SOLUTION RESPIRATORY (INHALATION) at 06:07

## 2023-07-23 RX ADMIN — ACETYLCYSTEINE 2 ML: 200 SOLUTION ORAL; RESPIRATORY (INHALATION) at 07:07

## 2023-07-23 RX ADMIN — ACETYLCYSTEINE 2 ML: 200 SOLUTION ORAL; RESPIRATORY (INHALATION) at 12:07

## 2023-07-23 RX ADMIN — MICONAZOLE NITRATE: 20 CREAM TOPICAL at 09:07

## 2023-07-23 RX ADMIN — MIDODRINE HYDROCHLORIDE 10 MG: 2.5 TABLET ORAL at 04:07

## 2023-07-23 RX ADMIN — SODIUM CHLORIDE: 0.9 INJECTION, SOLUTION INTRAVENOUS at 02:07

## 2023-07-23 RX ADMIN — VANCOMYCIN HYDROCHLORIDE 2000 MG: 1 INJECTION, POWDER, LYOPHILIZED, FOR SOLUTION INTRAVENOUS at 12:07

## 2023-07-23 RX ADMIN — MEROPENEM 1 G: 1 INJECTION, POWDER, FOR SOLUTION INTRAVENOUS at 02:07

## 2023-07-23 RX ADMIN — CARBOXYMETHYLCELLULOSE SODIUM 1 DROP: 5 SOLUTION/ DROPS OPHTHALMIC at 02:07

## 2023-07-23 RX ADMIN — MEROPENEM 1 G: 1 INJECTION, POWDER, FOR SOLUTION INTRAVENOUS at 05:07

## 2023-07-23 RX ADMIN — MUPIROCIN 1 G: 20 OINTMENT TOPICAL at 09:07

## 2023-07-23 RX ADMIN — IPRATROPIUM BROMIDE AND ALBUTEROL SULFATE 3 ML: .5; 3 SOLUTION RESPIRATORY (INHALATION) at 06:07

## 2023-07-23 RX ADMIN — CARBOXYMETHYLCELLULOSE SODIUM 1 DROP: 5 SOLUTION/ DROPS OPHTHALMIC at 09:07

## 2023-07-23 RX ADMIN — CARBOXYMETHYLCELLULOSE SODIUM 1 DROP: 5 SOLUTION/ DROPS OPHTHALMIC at 04:07

## 2023-07-23 NOTE — H&P
[facility] - Emergency Dept  Hospital Medicine  History & Physical    Patient Name: Genna Felix  MRN: [id number]  Patient Class: IP- Inpatient  Admission Date: 7/22/2023  Attending Physician: Samantha Donnelly MD  Primary Care Provider: Primary Doctor No         Patient information was obtained from relative(s), nursing home, past medical records and ER records.     Subjective:     Principal Problem:Pneumonia of both lungs due to infectious organism    Chief Complaint:   Chief Complaint   Patient presents with    Shortness of Breath     Arrived from [place] via [facility] with c/c SOB that started earlier today        HPI: Patient presents to ER due to respiratory distress.     Spoke with sister, Amelia. States that she was told her oxygen was dropping low at Ms. Felix's nursing home, [facility]. Was transferred to the ER. Called [facility] for more info and spoke with MsDenys Nica (a nurse who got sign out from the day team about patient). Was told that Respiratory was trying to improve her sats throughout the day however it never improved above 87%. Patient ws then transferred to ER. Staff noticed urine output was dirty with sediment so u/a and urine culture ordered.      Past Medical History:   Diagnosis Date    Anoxic brain damage 07/2020    Bedbound 07/2020    Cardiac arrest 07/2020    Cirrhosis     GERD (gastroesophageal reflux disease)     Hemangioma of intra-abdominal structure     Hypertension     Nursing home resident     Protein calorie malnutrition     Pulmonary embolus     Respiratory distress     S/P percutaneous endoscopic gastrostomy (PEG) tube placement 07/2020    Total self-care deficit 07/2020    Tracheostomy dependence     7/2020       Past Surgical History:   Procedure Laterality Date    PEG W/TRACHEOSTOMY PLACEMENT  07/27/2020    SKIN GRAFT      buttocks       Review of patient's allergies indicates:  No Known Allergies    No current facility-administered  medications on file prior to encounter.     Current Outpatient Medications on File Prior to Encounter   Medication Sig    acetaminophen (TYLENOL) 325 MG tablet 2 tablets (650 mg total) by Per G Tube route every 4 (four) hours as needed for Pain or Temperature greater than (100.4F).    acetylcysteine 100 mg/ml, 10%, (MUCOMYST) 100 mg/mL (10 %) nebulizer solution Take 4 mLs by nebulization every 8 (eight) hours.    albuterol-ipratropium (DUO-NEB) 2.5 mg-0.5 mg/3 mL nebulizer solution Take 3 mLs by nebulization every 6 (six) hours. Rescue    arginine/glutamine/calcium bmb (XIOMARA ORAL) 1 Package by PEG Tube route 2 (two) times a day. In 8oz of water    cefTRIAXone (ROCEPHIN) 1 gram injection Inject 1 g into the muscle daily as needed.    cycloSPORINE (RESTASIS) 0.05 % ophthalmic emulsion 1 drop 2 (two) times daily. 0900  2100    dextran 70-hypromellose (ARTIFICIAL TEARS,LAUR80-CXOCR,) ophthalmic solution Place 1 drop into both eyes 4 (four) times daily as needed.    ergocalciferol (ERGOCALCIFEROL) 50,000 unit Cap Take 50,000 Units by mouth every Saturday.    ferrous sulfate 300 mg (60 mg iron)/5 mL syrup Take 300 mg by mouth As instructed. Every other day    lactose-reduced food (ISOSOURCE ORAL) 1.5 mg by Per G Tube route continuous. 1.5 per peg tube at 50ml/hr continuously    linaCLOtide (LINZESS) 145 mcg Cap capsule Take 145 mcg by mouth before breakfast.    miconazole (MICOTIN) 2 % cream Apply topically 2 (two) times daily. Reason:  Tinea pedis for 14 days    midodrine (PROAMATINE) 10 MG tablet 1 tablet (10 mg total) by Per G Tube route 3 (three) times daily. (Patient taking differently: 10 mg by Per G Tube route 3 (three) times daily. 0600  1400  2200)    ondansetron (ZOFRAN) 4 MG tablet 4 mg by Per G Tube route every 8 (eight) hours as needed for Nausea.    polyethylene glycol (GLYCOLAX) 17 gram PwPk Take 17 g by mouth daily as needed.    polyvinyl alcohol-povidone (CLEAR EYES NATURAL TEARS) 0.5-0.6  % Drop Apply 1 drop to eye daily as needed.    [DISCONTINUED] clonazePAM (KLONOPIN) 0.5 MG tablet Take 0.5 mg by mouth 2 (two) times daily.    [DISCONTINUED] oxybutynin (DITROPAN) 5 MG Tab Take 5 mg by mouth 2 (two) times daily.     Family History       Problem Relation (Age of Onset)    Hypertension Mother    No Known Problems Father          Tobacco Use    Smoking status: Never    Smokeless tobacco: Never   Substance and Sexual Activity    Alcohol use: Not Currently    Drug use: Not Currently    Sexual activity: Not Currently     Review of Systems   Unable to perform ROS: Patient nonverbal   Objective:     Vital Signs (Most Recent):  Temp: (S) 96.6 °F (35.9 °C) (MD notified. applying warm blankets and turning up heat in room per VO from Dr. Nino) (07/22/23 2030)  Pulse: 101 (07/22/23 2137)  Resp: (!) 37 (07/22/23 2137)  BP: 110/61 (07/22/23 2110)  SpO2: (S) 95 % (07/22/23 2137) Vital Signs (24h Range):  Temp:  [96.6 °F (35.9 °C)] 96.6 °F (35.9 °C)  Pulse:  [] 101  Resp:  [11-44] 37  SpO2:  [88 %-98 %] 95 %  BP: (100-188)/() 110/61     Weight: 88.7 kg (195 lb 8 oz)  Body mass index is 38.18 kg/m².     Physical Exam  Constitutional:       General: She is not in acute distress.     Appearance: She is not ill-appearing.   HENT:      Head: Normocephalic and atraumatic.   Cardiovascular:      Rate and Rhythm: Normal rate and regular rhythm.   Pulmonary:      Breath sounds: Wheezing present.      Comments: Vent in place  Abdominal:      General: There is distension.   Neurological:      Mental Status: She is alert.   Psychiatric:      Comments: Patient non-verbal              Significant Labs:   Recent Lab Results  (Last 5 results in the past 24 hours)        07/22/23 1947 07/22/23 1938 07/22/23 1937 07/22/23 1936 07/22/23 1934        Influenza A, Molecular       Negative         Influenza B, Molecular       Negative         Procalcitonin         0.13  Comment: Procalcitonin Result  Interpretation:    <=0.50 ng/mL  Systemic infection (sepsis) is not likely. Local bacterial infection   is   possible.    >0.50 and <= 2.00 ng/mL  Systemic infection (sepsis) is possible, but other conditions are   also known   to elevate procalcitonin.     >2.00 ng/mL  Systemic infection (sepsis) is likely unless other causes are known.    >=10.00 ng/mL  Important systemic inflammatory response, almost exclusively due to   severe   bacterial sepsis or septic shock.         Albumin         3.7       Alkaline Phosphatase         187       Allens Test Pass               ALT         35       Anion Gap         8       AST         32       Baso #         0.02       Basophil %         0.3       BILIRUBIN TOTAL         0.7  Comment: For infants and newborns, interpretation of results should be based  on gestational age, weight and in agreement with clinical  observations.    Premature Infant recommended reference ranges:  Up to 24 hours.............<8.0 mg/dL  Up to 48 hours............<12.0 mg/dL  3-5 days..................<15.0 mg/dL  6-29 days.................<15.0 mg/dL         BNP         127  Comment: Values of less than 100 pg/ml are consistent with non-CHF populations.       Site RR               BUN         32       Calcium         9.3       Chloride         102       CO2         29       Creatinine         <0.3       DelSys Adult Vent               Differential Method         Automated       eGFR         >60.0       Eos #         0.4       Eosinophil %         4.7       FiO2 80               Flu A & B Source       Nasal swab         Glucose         89       Gran # (ANC)         5.1       Gran %         64.8       Hematocrit         33.5       Hemoglobin         9.4       Immature Grans (Abs)         0.03  Comment: Mild elevation in immature granulocytes is non specific and   can be seen in a variety of conditions including stress response,   acute inflammation, trauma and pregnancy. Correlation with other    laboratory and clinical findings is essential.         Immature Granulocytes         0.4       Lactate, Donovan         1.7  Comment: Falsely low lactic acid results can be found in samples   containing >=13.0 mg/dL total bilirubin and/or >=3.5 mg/dL   direct bilirubin.         Lymph #         1.5       Lymph %         19.1       MCH         26.3       MCHC         28.1       MCV         94       Min Vol 17               Mode AC/PRVC               Mono #         0.8       Mono %         10.7       MPV         12.2       nRBC         1       PEEP 10               PiP 42               Platelets         224       POC BE 6               POC Creatinine   0.3             POC Glucose 93     85           POC HCO3 31.3               POC Hematocrit 34               POC Ionized Calcium 1.29               POC PCO2 57.3               POC PH 7.346               POC PO2 69               Potassium, Blood Gas 4.7               POC SATURATED O2 92               Sodium, Blood Gas 142               POC TCO2 33               Potassium         4.5       PROTEIN TOTAL         10.3       Rate 16               RBC         3.58       RDW         21.2       Sample ARTERIAL   VENOUS             SARS-CoV-2 RNA, Amplification, Qual       Negative  Comment: This test utilizes isothermal nucleic acid amplification technology   to   detect the SARS-CoV-2 RdRp nucleic acid segment. The analytical   sensitivity   (limit of detection) is 500 copies/swab.     A POSITIVE result is indicative of the presence of SARS-CoV-2 RNA;   clinical   correlation with patient history and other diagnostic information is   necessary to determine patient infection status.    A NEGATIVE result means that SARS-CoV-2 nucleic acids are not present   above   the limit of detection. A NEGATIVE result should be treated as   presumptive.   It does not rule out the possibility of COVID-19 and should not be   the sole   basis for treatment decisions. If COVID-19 is strongly  suspected   based on   clinical and exposure history, re-testing using an alternate   molecular assay   should be considered.     This test is only for use under the Food and Drug Administration s   Emergency   Use Authorization (EUA).     Commercial kits are provided by [employer]. Performance   characteristics of the EUA have been independently verified by   [facility]  _________________________________________________________________   The authorized Fact Sheet for Healthcare Providers and the authorized   Fact   Sheet for Patients of the ID NOW COVID-19 are available on the FDA   website:   https://www.fda.gov/media/635883/download   https://www.fda.gov/media/621204/download           Sodium         139       Sp02 93               Troponin I High Sensitivity         40.0  Comment: Troponin results differ between methods. Do not use   results between Troponin methods interchangeably.    Alkaline Phospatase levels above 400 U/L may   cause false positive results.    Access hsTnI should not be used for patients taking   Asfotase terry (Strensiq).         Vt 360               WBC         7.82                              Significant Imaging:     Imaging Results              CTA Chest Abdomen Pelvis (Final result)  Result time 07/22/23 22:24:45      Final result by Danny Rasmussen DO (07/22/23 22:24:45)                   Narrative:    PROCEDURE:  CT Angiography Chest, Abdomen and Pelvis With Intravenous Contrast    CLINICAL INDICATION:  The patient is 62 years old and is Female; respiratory distress    TECHNIQUE:  Axial computed tomographic angiography images of the chest, abdomen and pelvis with intravenous contrast.  This CT exam was performed using one or more of the following dose reduction techniques:  automated exposure control, adjustment of the mA and/or kV according to patient size, and/or use of iterative reconstruction technique.  MIP reconstructed images were  created and reviewed.    DLP: 955 mGycm    CONTRAST:  100mL of Omnipaque 350 was administered intravenously.    COMPARISON:  Chest radiograph of the same day and CT abdomen and pelvis dated 7/6/2023.    FINDINGS:    VASCULATURE:  AORTA:  No acute findings.  No aortic aneurysm.  No dissection.  PULMONARY ARTERIES:  Unremarkable as visualized.  No pulmonary embolism is identified.  GREAT VESSELS OF AORTIC ARCH:  No acute findings.  No dissection.  No arterial occlusion or significant stenosis.  CELIAC TRUNK AND MESENTERIC ARTERIES:  No acute findings.  No occlusion or significant stenosis.  RENAL ARTERIES:  No acute findings.  No occlusion or significant stenosis.  ILIAC ARTERIES:  No acute findings.  No occlusion or significant stenosis.    CHEST:  LUNGS:  Bilateral interstitial opacities with interlobular septal thickening and consolidation in the right lower lobe.  PLEURAL SPACE:  Small bilateral pleural effusions.  No pneumothorax.  HEART:  Unremarkable.  No cardiomegaly.  No significant pericardial effusion.  MEDIASTINUM:  Mediastinal and bihilar lymphadenopathy.    ABDOMEN:  LIVER:  Hepatic steatosis.  GALLBLADDER AND BILE DUCTS:  Unremarkable.  No calcified stones.  No ductal dilation.  PANCREAS:  Unremarkable.  No ductal dilation.  No mass.  SPLEEN:  Splenomegaly.  ADRENALS:  Unremarkable.  No mass.  KIDNEYS AND URETERS:  Unremarkable.  No hydronephrosis.  No solid mass.  STOMACH AND BOWEL:  Unremarkable.  No obstruction.  No mucosal thickening.    PELVIS:  APPENDIX:  No findings to suggest acute appendicitis.  BLADDER:  Bladder is decompressed.  REPRODUCTIVE:  Prior hysterectomy.    CHEST, ABDOMEN and PELVIS:  INTRAPERITONEAL SPACE:  Unremarkable.  No significant fluid collection.  No free air.  BONES/JOINTS:  Severe heterotopic bone formation arising from the left acetabulum. Milder similar-appearing finding on the right. Advanced left shoulder degenerative changes.  Diffuse osteopenia. Multilevel  degenerative changes.  No acute fracture.  No dislocation.  SOFT TISSUES:  Fat-containing umbilical hernia.  LYMPH NODES:  Unremarkable.  No enlarged lymph nodes.  TUBES, LINES AND DEVICES:  Partially evaluated gastrostomy and tracheostomy tubes.    IMPRESSION:    1.  No acute aortic abnormality or pulmonary embolism.    2.  Bilateral interstitial opacities with interlobular septal thickening and consolidation in the right lower lobe.  Imaging features can be seen with COVID pneumonia, though are nonspecific and can occur with a variety of infectious and noninfectious processes. PneInd.    3.  Mediastinal and bihilar lymphadenopathy.    4.  Small bilateral pleural effusions.    5.  No acute abdominal or pelvic abnormality.    6.  Splenomegaly.    7.  Hepatic steatosis.    Electronically signed by:  Danny Rasmussen DO  7/22/2023 10:24 PM CDT Workstation: [id number]                                     X-Ray Chest AP Portable (Final result)  Result time 07/22/23 19:49:01      Final result by Jayant Junior MD (07/22/23 19:49:01)                   Narrative:    CLINICAL DATA:  Sepsis and shortness of breath    TECHNIQUE:  Views: A single AP view.  Limitations: The images are technically satisfactory.    COMPARISON:  July 8, 2023    FINDINGS:  CARDIOVASCULAR: Normal.    LUNGS AND PLEURA:  1. Infiltrative changes are seen diffusely throughout both lungs. Allowing for technical differences these have increased slightly since last examination.    MEDIASTINAL AND HILAR STRUCTURES: Normal.    OSSEOUS STRUCTURES: Normal.    ADDITIONAL FINDINGS:  1. The tracheostomy tube remains in good position.    IMPRESSION:    1.  Slight increasing infiltrates in the lungs.    This document was created using a voice recognition transcribing system. Incorrect words or phrases may have been missed during proof reading. Please interpret accordingly or contact the radiologist for clarification if necessary.    Electronically signed by:   Jayant Junior MD  7/22/2023 7:49 PM CDT Workstation: [id number]                                      Assessment/Plan:     * Pneumonia of both lungs due to infectious organism  Pulm consulted  Continue vanc and merrem      Urine finding  Due to possible uti and hx of EBSL, will start merrem  F/u u/a and urine culture  Nursing staff in ER had trouble placing cath so patient currently has a pure wick (ICU will attempt to place carroll)   Bladder scan normal  CT abd/pelvis showed no abnormalities with bladder      Ventilator dependence        Difficult Carroll catheter placement        Essential hypertension  This was listed in medical history but it doesn't look like patient takes anything for it  Continue to monitor      Persistent vegetative state  Turn patient q2 hours  Shower patient daily  Consult nutrition for diet recs    Anemia of chronic disease  Continue home med of iron QOD        VTE Risk Mitigation (From admission, onward)    Shaista Donnelly MD  Department of Hospital Medicine  [facility] - Emergency Dept

## 2023-07-23 NOTE — ASSESSMENT & PLAN NOTE
This was listed in medical history but it doesn't look like patient takes anything for it  Continue to monitor

## 2023-07-23 NOTE — PROGRESS NOTES
[facility] Medicine  Progress Note    Patient Name: Genna Felix  MRN: [id number]  Patient Class: IP- Inpatient   Admission Date: 7/22/2023  Length of Stay: 1 days  Attending Physician: Samantha Donnelly MD  Primary Care Provider: Primary Doctor No        Subjective:     Principal Problem:Pneumonia of both lungs due to infectious organism        HPI:  Patient presents to ER due to respiratory distress.     Spoke with sister, Amelia. States that she was told her oxygen was dropping low at Ms. Felix's nursing home, [place]. Was transferred to the ER. Called [facility] for more info and spoke with Ms. Yousif (a nurse who got sign out from the day team about patient). Was told that Respiratory was trying to improve her sats throughout the day however it never improved above 87%. Patient ws then transferred to ER. Staff noticed urine output was dirty with sediment so u/a and urine culture ordered.      Overview/Hospital Course:  No notes on file    Interval History:  Patient is hypothermic with body temperature 92.5 degrees Fahrenheit.  Patient is receiving Beth Hugger.  Noncommunicative, on ventilator therapy.    Review of Systems   Unable to perform ROS: Patient nonverbal   Objective:     Vital Signs (Most Recent):  Temp: (!) 92.5 °F (33.6 °C) (07/23/23 0945)  Pulse: 85 (07/23/23 0945)  Resp: (!) 30 (07/23/23 0945)  BP: (!) 98/59 (07/23/23 0945)  SpO2: (!) 93 % (07/23/23 0945) Vital Signs (24h Range):  Temp:  [91.6 °F (33.1 °C)-97 °F (36.1 °C)] 92.5 °F (33.6 °C)  Pulse:  [] 85  Resp:  [2-48] 30  SpO2:  [88 %-98 %] 93 %  BP: ()/() 98/59     Weight: 88.7 kg (195 lb 8 oz)  Body mass index is 38.18 kg/m².    Intake/Output Summary (Last 24 hours) at 7/23/2023 1028  Last data filed at 7/23/2023 0301  Gross per 24 hour   Intake --   Output 145 ml   Net -145 ml         Physical Exam  Constitutional:       General: She is not in acute distress.     Appearance: She is not  ill-appearing.   HENT:      Head: Normocephalic and atraumatic.   Cardiovascular:      Rate and Rhythm: Normal rate and regular rhythm.   Pulmonary:      Breath sounds: Wheezing present.      Comments: Vent in place  Abdominal:      General: There is distension.   Neurological:      Mental Status: She is alert.   Psychiatric:      Comments: Patient non-verbal           Significant Labs: All pertinent labs within the past 24 hours have been reviewed.  CBC:   Recent Labs   Lab 07/22/23 1934 07/22/23 1947 07/23/23  0315   WBC 7.82  --  8.36   HGB 9.4*  --  8.7*   HCT 33.5* 34* 29.9*     --  181     CMP:   Recent Labs   Lab 07/22/23 1934 07/23/23  0315    139   K 4.5 5.0    104   CO2 29 27   GLU 89 165*   BUN 32* 29*   CREATININE <0.3* 0.3*   CALCIUM 9.3 9.4   PROT 10.3* 9.4*   ALBUMIN 3.7 3.4*   BILITOT 0.7 1.3*   ALKPHOS 187* 157*   AST 32 31   ALT 35 35   ANIONGAP 8 8     Coagulation: No results for input(s): PT, INR, APTT in the last 48 hours.  Lactic Acid:   Recent Labs   Lab 07/22/23 1934 07/23/23  0025   LACTATE 1.7 1.9     Troponin:   Recent Labs   Lab 07/22/23 1934   TROPONINIHS 40.0*     TSH:   Recent Labs   Lab 07/06/23  1041   TSH 2.510     Urine Studies: No results for input(s): COLORU, APPEARANCEUA, PHUR, SPECGRAV, PROTEINUA, GLUCUA, KETONESU, BILIRUBINUA, OCCULTUA, NITRITE, UROBILINOGEN, LEUKOCYTESUR, RBCUA, WBCUA, BACTERIA, SQUAMEPITHEL, HYALINECASTS in the last 48 hours.    Invalid input(s): [facility]  Microbiology Results (last 7 days)       Procedure Component Value Units Date/Time    Blood culture x two cultures. Draw prior to antibiotics. [[id number]] Collected: 07/22/23 2044    Order Status: Completed Specimen: Blood Updated: 07/23/23 0517     Blood Culture, Routine No Growth to date    Narrative:      Aerobic and anaerobic  Collection has been rescheduled by 2MB at 07/22/2023 19:43 Reason: Amairani liz said she will call when she wants cultures drawn  Collection has been  rescheduled by 2MB at 07/22/2023 19:43 Reason: Amairani liz said she will call when she wants cultures drawn    Blood culture x two cultures. Draw prior to antibiotics. [[id number]] Collected: 07/22/23 2050    Order Status: Completed Specimen: Blood Updated: 07/23/23 0517     Blood Culture, Routine No Growth to date    Narrative:      Aerobic and anaerobic  Collection has been rescheduled by 2MB at 07/22/2023 19:43 Reason: Amairani liz said she will call when she wants cultures drawn  Collection has been rescheduled by 2MB at 07/22/2023 19:43 Reason: Amairani liz said she will call when she wants cultures drawn          Significant Imaging:   CTA Chest:  1.  No acute aortic abnormality or pulmonary embolism.  2.  Bilateral interstitial opacities with interlobular septal thickening and consolidation in the right lower lobe.  Imaging features can be seen with COVID pneumonia, though are nonspecific and can occur with a variety of infectious and noninfectious processes. PneInd.  3.  Mediastinal and bihilar lymphadenopathy.  4.  Small bilateral pleural effusions.  5.  No acute abdominal or pelvic abnormality.  6.  Splenomegaly.  7.  Hepatic steatosis.    CXR:   Slight increasing infiltrates in the lungs.      Assessment/Plan:      * Pneumonia of both lungs due to infectious organism  Pulm consulted  Continue vanc and merrem.  Hypothermia possibly related to underlying sepsis.  Follow microbiology results.  Continue intravenous antibiotics.  Will consult ID specialist for opinion.  Discussed with Dr. Mccormack.      Urine finding  Due to possible uti and hx of EBSL, will start merrem  F/u u/a and urine culture  Nursing staff in ER had trouble placing cath so patient currently has a pure wick (ICU will attempt to place carroll)   Bladder scan normal  CT abd/pelvis showed no abnormalities with bladder.      Ventilator dependence  Follow pulmonary recommendations.      Difficult Carroll catheter placement        Essential  hypertension  This was listed in medical history but it doesn't look like patient takes anything for it  Continue to monitor      Persistent vegetative state  Turn patient q2 hours  Shower patient daily  Consult nutrition for diet recs    Anemia of chronic disease  Continue home med of iron QOD        VTE Risk Mitigation (From admission, onward)         Ordered     enoxaparin injection 40 mg  Daily         07/23/23 0225     IP VTE HIGH RISK PATIENT  Once         07/23/23 0225     Place sequential compression device  Until discontinued         07/23/23 0225                Discharge Planning   [place]: TBD      Code Status: Full Code   Is the patient medically ready for discharge?:     Reason for patient still in hospital (select all that apply): Patient trending condition and Consult recommendations                     Geeta Siddiqi MD  Department of Hospital Medicine   [facility]

## 2023-07-23 NOTE — ED NOTES
Bed: 01A Trauma  Expected date:   Expected time:   Means of arrival:   Comments:  EMS- Kimberlyn Vent

## 2023-07-23 NOTE — ASSESSMENT & PLAN NOTE
Due to possible uti and hx of EBSL, will start merrem  F/u u/a and urine culture  Nursing staff in ER had trouble placing cath so patient currently has a pure wick (ICU will attempt to place carroll)   Bladder scan normal  CT abd/pelvis showed no abnormalities with bladder.

## 2023-07-23 NOTE — CARE UPDATE
07/23/23 0025   Patient Assessment/Suction   Level of Consciousness (AVPU) responds to pain   Respiratory Effort Normal;Unlabored   Expansion/Accessory Muscles/Retractions no use of accessory muscles   All Lung Fields Breath Sounds equal bilaterally;coarse   Rhythm/Pattern, Respiratory assisted mechanically   Cough Frequency with stimulation   Cough Type assisted   Suction Method tracheal   $ Suction Charges Inline Suction Procedure Stat Charge   Secretions Amount moderate   Secretions Color pale;yellow   Secretions Characteristics thick   PRE-TX-O2   Device (Oxygen Therapy) ventilator   Oxygen Concentration (%) 70   SpO2 96 %   Pulse Oximetry Type Continuous   $ Pulse Oximetry - Multiple Charge Pulse Oximetry - Multiple   Pulse 82   Resp (!) 34   BP 98/69   Adult Surgical Airway Shiley Cuffed 6.0/ 75mm   No placement date or time found.   Present Prior to Hospital Arrival?: Yes  Type: Tracheostomy  Brand: Shiley  Airway Device Style: Cuffed  Airway Device Size: 6.0/ 75mm   Cuff Inflation? Inflated   Status Secured   Site Assessment Clean;Dry   Site Care Cleansed;Dried;Dressing applied   Inner Cannula Care Cleansed/dried   Ties Assessment Clean;Dry;Intact;Secure   Airway Safety   Is Ambu Bag and Mask with Patient? Yes, Adult Ambu Bag and Mask   Respiratory Interventions   Airway/Ventilation Management airway patency maintained   Vent Select   Charged w/in last 24h YES   Preset Conventional Ventilator Settings   Vent ID [id number]   Vent Type    Ventilation Type VC   Vent Mode A/C   Humidity HME   Set Rate 16 BPM   Vt Set 360 mL   PEEP/CPAP 5 cmH20   Pressure Support 0 cmH20   Waveform RAMP   Peak Flow 50 L/min   Plateau Set/Insp. Hold (sec) 0   Trigger Sensitivity Flow/I-Trigger 2.7 L/min   Patient Ventilator Parameters   Resp Rate Total 35 br/min   Peak Airway Pressure 33 cmH20   Mean Airway Pressure 16 cmH20   Plateau Pressure 0 cmH20   Exhaled Vt 398 mL   Total Ve 12.9 L/m   I:E Ratio Measured 1:1.30    Conventional Ventilator Alarms   Alarms On Y   Resp Rate High Alarm 50 br/min   Press High Alarm 70 cmH2O   Apnea Rate 10   Apnea Volume (mL) 1 mL   Apnea Oxygen Concentration  100   Apnea Flow Rate (L/min) 68   T Apnea 25 sec(s)   Ready to Wean/Extubation Screen   FIO2<=50 (chart decimal) (!) 0.7   MV<16L (chart vol.) 12.9   PEEP <=8 (chart #) 5   Ready to Wean Parameters   F/VT Ratio<105 (RSBI) (!) 85.43   Education   $ Education Ventilator Oxygen;Trach Care;Suction;15 min   Respiratory Evaluation   $ Care Plan Tech Time 15 min   $ Eval/Re-eval Charges Evaluation   Evaluation For New Orders

## 2023-07-23 NOTE — NURSING
Axillary temperature evaluated to be 91.6F. Oral temperature attained, 91.7F. Rectal continuous temperature probe placed reading 91.6F. GEORGIE Hugger placed on patient.

## 2023-07-23 NOTE — HPI
Patient presents to ER due to respiratory distress.     Spoke with sister, Amelia. States that she was told her oxygen was dropping low at Ms. Felix's nursing home, [place]. Was transferred to the ER. Called [facility] for more info and spoke with Ms. Yousif (a nurse who got sign out from the day team about patient). Was told that Respiratory was trying to improve her sats throughout the day however it never improved above 87%. Patient ws then transferred to ER. Staff noticed urine output was dirty with sediment so u/a and urine culture ordered.

## 2023-07-23 NOTE — CONSULTS
Consult Note  Infectious Disease    Reason for Consult:  Hypothermia, sepsis    HPI: Genna Felix is a 62 y.o. female resident of [facility], known to ID service from prior visits, w PMHx of MAICO 2020, vegetative state, vent dependent, tracheostomy, PEG, PE, HTN, GERD, anemia, osteopenia, acute cholecystitis status post IR drainage in January '22, right arm infection in August '22, MDRs infections like Pseudomonas , Acinetobacter , MRSA, Proteus mirabilis ESBL etc.    She has multiple hospitalizations.   Most recent hospitalization was in the beginning of July.  She had x2 Proteus mirabilis ESBL in urine, Serratia marcescens in sputum, and blood cultures had coag-negative staph on 07/06/2023.  Patient completed treatment with meropenem.    This time, patient is sent to ED because of decreased oxygenation, pulse ox around 87%.  CTA ruled out PE, but found interstitial opacities septal thickening and consolidation of right lower lobe.    She is hypothermic, which is her usual sign of sepsis/infection, needing a Beth Hugger.  She is admitted in ICU, intubated through tracheostomy, FiO2 of 70%, receiving vancomycin and meropenem.  Secretions are pale and yellow.  Sputum cultures are not sent yet, I just did.  Procalcitonin normal at 0.13.  ,  which is slightly higher than usual, blood cultures NGTD        Antibiotics (From admission, onward)      Start     Stop Route Frequency Ordered    07/23/23 1300  vancomycin 1.5 g in dextrose 5 % 250 mL IVPB (ready to mix)        Note to Pharmacy: Ht: 5' (1.524 m)  Wt: 88.7 kg (195 lb 8 oz)  CrCl cannot be calculated (This lab value cannot be used to calculate CrCl because it is not a number: <0.3).  Body mass index is 38.18 kg/m².    -- IV Every 12 hours (non-standard times) 07/23/23 0125    07/23/23 0900  mupirocin 2 % ointment         07/28 0859 Nasl 2 times daily 07/23/23 0032    07/23/23 0500  meropenem 1 g in sodium chloride 0.9 % 100 mL IVPB (ready to  mix system)         -- IV Every 8 hours (non-standard times) 07/23/23 0227 07/22/23 2105  vancomycin - pharmacy to dose  (vancomycin IVPB (PEDS and ADULTS))        See [facility] for full Linked Orders Report.    -- IV pharmacy to manage frequency 07/22/23 2006          Antifungals (From admission, onward)      Start     Stop Route Frequency Ordered    07/23/23 0900  miconazole 2 % cream         -- Top 2 times daily 07/23/23 0227          Antivirals (From admission, onward)      None              Review of patient's allergies indicates:  No Known Allergies  Past Medical History:   Diagnosis Date    Anoxic brain damage 07/2020    Bedbound 07/2020    Cardiac arrest 07/2020    Cirrhosis     GERD (gastroesophageal reflux disease)     Hemangioma of intra-abdominal structure     Hypertension     Nursing home resident     Protein calorie malnutrition     Pulmonary embolus     Respiratory distress     S/P percutaneous endoscopic gastrostomy (PEG) tube placement 07/2020    Total self-care deficit 07/2020    Tracheostomy dependence     7/2020     Past Surgical History:   Procedure Laterality Date    PEG W/TRACHEOSTOMY PLACEMENT  07/27/2020    SKIN GRAFT      buttocks     Social History     Socioeconomic History    Marital status:    Tobacco Use    Smoking status: Never    Smokeless tobacco: Never   Substance and Sexual Activity    Alcohol use: Not Currently    Drug use: Not Currently    Sexual activity: Not Currently     Family History   Problem Relation Age of Onset    Hypertension Mother     No Known Problems Father          Review of Systems:   na    EXAM & DIAGNOSTICS REVIEWED:   Vitals:     Temp:  [91.6 °F (33.1 °C)-97 °F (36.1 °C)]   Temp: 96.8 °F (36 °C) (07/23/23 1245)  Pulse: 100 (07/23/23 1245)  Resp: (!) 39 (07/23/23 1245)  BP: (!) 101/55 (07/23/23 1315)  SpO2: (!) 94 % (07/23/23 1245)    Intake/Output Summary (Last 24 hours) at 7/23/2023 1327  Last data filed at 7/23/2023 0301  Gross per 24 hour    Intake --   Output 145 ml   Net -145 ml     Vent Mode: A/C  Oxygen Concentration (%):  [50-80] 70  Resp Rate Total:  [24 br/min-47 br/min] 37 br/min  Vt Set:  [360 mL] 360 mL  PEEP/CPAP:  [5 cmH20-10 cmH20] 5 cmH20  Pressure Support:  [0 cmH20] 0 cmH20  Mean Airway Pressure:  [13 pgP80-00 cmH20] 13 cmH20     General:  Frail elderly lady, laying in bed, intubated, Beth Hugger on,  Eyes:  Anicteric, EOM can not be tested  ENT:  Difficult to examine.  Dry lips.  Neck:  supple, no masses or adenopathy appreciated.  Tracheostomy to vent.  Lungs: Clear anteriorly, diminished on the right lung field, no rales or wheezes.  Heart:  RRR, no gallop/murmur/rub noted  Abd:  Her abdomen is usually distended, hypoactive bowel sounds, no pain, PEG in place.  :  Hamilton has not been placed yet, nursing staff is in process of doing so  Musc:  Joints without effusion, swelling, erythema, synovitis; positive for muscle wasting.   Skin:  See pictures.  Positive for dry skin throughout.  No palmar or plantar lesions. No subungual petechiae  Neuro:        Gaze  towards the right upper lung field  Psych  na  Lymphatic:     No cervical, supraclavicular, axillary, or inguinal nodes  Extrem: No edema, erythema, phlebitis, cellulitis, warm and well perfused  VAD:       Isolation:  Contact isolation/[place] resident  Wound:         Lines/Tubes/Drains:    General Labs reviewed:  Recent Labs   Lab 07/22/23 1934 07/22/23 1947 07/23/23 0315   WBC 7.82  --  8.36   HGB 9.4*  --  8.7*   HCT 33.5* 34* 29.9*     --  181       Recent Labs   Lab 07/22/23 1934 07/23/23 0315    139   K 4.5 5.0    104   CO2 29 27   BUN 32* 29*   CREATININE <0.3* 0.3*   CALCIUM 9.3 9.4   PROT 10.3* 9.4*   BILITOT 0.7 1.3*   ALKPHOS 187* 157*   ALT 35 35   AST 32 31     No results for input(s): CRP in the last 168 hours.      Micro:  Microbiology Results (last 7 days)       Procedure Component Value Units Date/Time    Blood culture x two cultures.  Draw prior to antibiotics. [[id number]] Collected: 07/22/23 2044    Order Status: Completed Specimen: Blood Updated: 07/23/23 0517     Blood Culture, Routine No Growth to date    Narrative:      Aerobic and anaerobic  Collection has been rescheduled by 2MB at 07/22/2023 19:43 Reason: Amairani liz said she will call when she wants cultures drawn  Collection has been rescheduled by 2MB at 07/22/2023 19:43 Reason: Amairani liz said she will call when she wants cultures drawn    Blood culture x two cultures. Draw prior to antibiotics. [[id number]] Collected: 07/22/23 2050    Order Status: Completed Specimen: Blood Updated: 07/23/23 0517     Blood Culture, Routine No Growth to date    Narrative:      Aerobic and anaerobic  Collection has been rescheduled by 2MB at 07/22/2023 19:43 Reason: Amairani liz said she will call when she wants cultures drawn  Collection has been rescheduled by 2MB at 07/22/2023 19:43 Reason: Amairani liz said she will call when she wants cultures drawn            Imaging Reviewed:  Chest x-ray 07/22/2023.      Slight increasing infiltrates in the lungs.    CT 07/22/2023.     1.  No acute aortic abnormality or pulmonary embolism.  2.  Bilateral interstitial opacities with interlobular septal thickening and consolidation in the right lower lobe.  Imaging features can be seen with COVID pneumonia, though are nonspecific and can occur with a variety of infectious and noninfectious processes. PneInd.  3.  Mediastinal and bihilar lymphadenopathy.  4.  Small bilateral pleural effusions.  5.  No acute abdominal or pelvic abnormality.  6.  Splenomegaly.  7.  Hepatic steatosis.      Cardiology:    IMPRESSION & PLAN   Sepsis, hypothermia, hypoxemia (neg PE)  pneumonia Rt>>lt ; COVID and influenza are negative   not sure if UTI because urine has not been sent yet    2.  Resident of [facility], known to ID service from prior visits, w PMHx of MAICO 2020, vegetative state, vent dependent,  tracheostomy, PEG, PE, HTN, GERD, anemia, osteopenia, acute cholecystitis status post IR drainage in January '22, right arm infection in August '22, MDRs infections like Pseudomonas , Acinetobacter , MRSA, Proteus mirabilis ESBL etc.Splenomegaly, hepato steatosis,      Recommendations:  Continue current broad antimicrobial coverage :  Vancomycin and meropenem; keeping mind recent culture results.  Continue vent support   Clear secretions   Follow sputum cultures which I just ordered     Follow urinalysis with reflex culture, which has been ordered by not collected.  Hamilton could not be placed in emergency room or in ICU till now.       Check MRSA screen.  If still negative will discontinue vancomycin soon.    Dr. Schneider comes back to ID service tomorrow    Medical Decision Making during this encounter was  [_] Low Complexity  [_] Moderate Complexity  [ xxx ] High Complexity

## 2023-07-23 NOTE — PLAN OF CARE
Problem: Infection  Goal: Absence of Infection Signs and Symptoms  7/23/2023 0540 by Marisa Cao RN  Outcome: Ongoing, Progressing  7/23/2023 0512 by Marisa Cao RN  Outcome: Ongoing, Progressing     Problem: Adult Inpatient Plan of Care  Goal: Plan of Care Review  7/23/2023 0540 by Marisa Cao RN  Outcome: Ongoing, Progressing  7/23/2023 0512 by Marisa Cao RN  Outcome: Ongoing, Progressing  Goal: Patient-Specific Goal (Individualized)  7/23/2023 0540 by Marisa Cao RN  Outcome: Ongoing, Progressing  7/23/2023 0512 by Marisa Cao RN  Outcome: Ongoing, Progressing  Goal: Absence of Hospital-Acquired Illness or Injury  7/23/2023 0540 by Marisa Cao RN  Outcome: Ongoing, Progressing  7/23/2023 0512 by Marisa Cao RN  Outcome: Ongoing, Progressing  Goal: Optimal Comfort and Wellbeing  7/23/2023 0540 by Marisa Cao RN  Outcome: Ongoing, Progressing  7/23/2023 0512 by Marisa Cao RN  Outcome: Ongoing, Progressing  Goal: Readiness for Transition of Care  Outcome: Ongoing, Progressing     Problem: Fluid Imbalance (Pneumonia)  Goal: Fluid Balance  Outcome: Ongoing, Progressing     Problem: Infection (Pneumonia)  Goal: Resolution of Infection Signs and Symptoms  Outcome: Ongoing, Progressing                 SICU PLAN OF CARE NOTE    Dx: Pneumonia of both lungs due to infectious organism    Shift Events: POC discussed with patient. Received pt from ED. Pt on ventilator with yellow white secretions noted. Pt given nightly bath. Wound noted on L buttocks area. Picture taken and mepalex applied.      Goals of Care: Antibiotics given to help with infection    Neuro: ANGELA     Vital Signs: BP (!) 158/83   Pulse 100   Temp 97 °F (36.1 °C)   Resp (!) 32   Ht 5' (1.524 m)   Wt 88.7 kg (195 lb 8 oz)   SpO2 (!) 92%   Breastfeeding No   BMI 38.18 kg/m²     Respiratory: Ventilator    Diet: NPO    Gtts: NS at 100 ml/hr via 20g R FA    Urine Output: Incontinent 145 cc/ since  2300    Labs/Accuchecks: See results for labs values. .    Skin: See flow sheets

## 2023-07-23 NOTE — ED PROVIDER NOTES
Encounter Date: 7/22/2023       History     Chief Complaint   Patient presents with    Shortness of Breath     Arrived from [place] via [email or web address] with c/c SOB that started earlier today     62-year-old female with a history of anoxic brain injury, cardiac arrest, reflux, trach and vent dependent, history of PE, presents emergency department with respiratory failure and respiratory distress.  Patient brought in by EMS and is has the patient on the vent but patient is over breathing vent at 4042 per minute.  Patient reported to have respiratory distress throughout the day today.  Respiratory has been adjusting the event but still having difficulty breathing.  Sent to the hospital for further evaluation of symptoms.  No other history able to be provided by the patient as she has anoxic brain injury.  Patient is dependent on the vent and has a tracheostomy.    Review of patient's allergies indicates:  No Known Allergies  Past Medical History:   Diagnosis Date    Anoxic brain damage 07/2020    Bedbound 07/2020    Cardiac arrest 07/2020    Cirrhosis     GERD (gastroesophageal reflux disease)     Hemangioma of intra-abdominal structure     Hypertension     Nursing home resident     Protein calorie malnutrition     Pulmonary embolus     Respiratory distress     S/P percutaneous endoscopic gastrostomy (PEG) tube placement 07/2020    Total self-care deficit 07/2020    Tracheostomy dependence     7/2020     Past Surgical History:   Procedure Laterality Date    PEG W/TRACHEOSTOMY PLACEMENT  07/27/2020    SKIN GRAFT      buttocks     Family History   Problem Relation Age of Onset    Hypertension Mother     No Known Problems Father      Social History     Tobacco Use    Smoking status: Never    Smokeless tobacco: Never   Substance Use Topics    Alcohol use: Not Currently    Drug use: Not Currently     Review of Systems   Unable to perform ROS: Patient nonverbal     Physical Exam     Initial Vitals   BP Pulse Resp Temp SpO2    07/22/23 1931 07/22/23 1922 07/22/23 1922 07/22/23 2030 07/22/23 1922   (!) 188/105 (!) 116 (!) 33 (S) 96.6 °F (35.9 °C) (!) 91 %      MAP       --                Physical Exam    Nursing note and vitals reviewed.  Constitutional: She appears well-developed and well-nourished. She appears distressed.   HENT:   Head: Normocephalic and atraumatic.   Right Ear: External ear normal.   Mouth/Throat: No oropharyngeal exudate.   Eyes: Conjunctivae and EOM are normal. Pupils are equal, round, and reactive to light.   Neck: Neck supple. No tracheal deviation present.   Patient has a tracheostomy in the midline   Cardiovascular:  Regular rhythm, normal heart sounds and intact distal pulses.           No murmur heard.  Tachycardic   Pulmonary/Chest: No stridor. She is in respiratory distress. She has wheezes (diffuse expiratory bilaterally). She has no rhonchi. She has no rales.   Tachypneic, over breathing the vent   Abdominal: Abdomen is soft. She exhibits no distension. There is no abdominal tenderness. There is no rebound.   Genitourinary:    Genitourinary Comments: Hamilton catheter in place with sediment draining in the Hamilton catheter tubing.  I did roll the patient look at her back I do not see any evidence of any breakdown.  Patient was covered in brown stool.     Musculoskeletal:         General: No tenderness or edema. Normal range of motion.      Cervical back: Neck supple.      Comments: Patient with cushion booties on her feet bilaterally     Neurological: She is alert. No sensory deficit.   Patient bed-bound, GCS 3   Skin: Skin is warm and dry. Capillary refill takes less than 2 seconds. No rash noted. No erythema. No pallor.       ED Course   Procedures  Labs Reviewed   CBC W/ AUTO DIFFERENTIAL - Abnormal; Notable for the following components:       Result Value    RBC 3.58 (*)     Hemoglobin 9.4 (*)     Hematocrit 33.5 (*)     MCH 26.3 (*)     MCHC 28.1 (*)     RDW 21.2 (*)     nRBC 1 (*)     All other  components within normal limits   COMPREHENSIVE METABOLIC PANEL - Abnormal; Notable for the following components:    BUN 32 (*)     Creatinine <0.3 (*)     Total Protein 10.3 (*)     Alkaline Phosphatase 187 (*)     All other components within normal limits   B-TYPE NATRIURETIC PEPTIDE - Abnormal; Notable for the following components:     (*)     All other components within normal limits   TROPONIN I HIGH SENSITIVITY - Abnormal; Notable for the following components:    Troponin I High Sensitivity 40.0 (*)     All other components within normal limits   ISTAT CREATININE - Abnormal; Notable for the following components:    POC Creatinine 0.3 (*)     All other components within normal limits   ISTAT PROCEDURE - Abnormal; Notable for the following components:    POC PH 7.346 (*)     POC PCO2 57.3 (*)     POC PO2 69 (*)     POC HCO3 31.3 (*)     POC SATURATED O2 92 (*)     POC TCO2 33 (*)     POC Hematocrit 34 (*)     All other components within normal limits   CULTURE, BLOOD    Narrative:     Collection has been rescheduled by 2MB at 07/22/2023 19:43 Reason: Amairani liz said she will call when she wants cultures drawn  Collection has been rescheduled by 2MB at 07/22/2023 19:43 Reason: Amairani liz said she will call when she wants cultures drawn   CULTURE, BLOOD    Narrative:     Collection has been rescheduled by 2MB at 07/22/2023 19:43 Reason: Amairani liz said she will call when she wants cultures drawn  Collection has been rescheduled by 2MB at 07/22/2023 19:43 Reason: Amairani liz said she will call when she wants cultures drawn   LACTIC ACID, PLASMA   INFLUENZA A AND B ANTIGEN    Narrative:     Specimen Source->Nasopharyngeal Swab   PROCALCITONIN   SARS-COV-2 RNA AMPLIFICATION, QUAL   URINALYSIS, REFLEX TO URINE CULTURE   URINALYSIS   POCT GLUCOSE   POCT CREATININE          Results for orders placed or performed during the hospital encounter of 07/22/23   CBC auto differential   Result Value Ref  Range    WBC 7.82 3.90 - 12.70 K/uL    RBC 3.58 (L) 4.00 - 5.40 M/uL    Hemoglobin 9.4 (L) 12.0 - 16.0 g/dL    Hematocrit 33.5 (L) 37.0 - 48.5 %    MCV 94 82 - 98 fL    MCH 26.3 (L) 27.0 - 31.0 pg    MCHC 28.1 (L) 32.0 - 36.0 g/dL    RDW 21.2 (H) 11.5 - 14.5 %    Platelets 224 150 - 450 K/uL    MPV 12.2 9.2 - 12.9 fL    Immature Granulocytes 0.4 0.0 - 0.5 %    Gran # (ANC) 5.1 1.8 - 7.7 K/uL    Immature Grans (Abs) 0.03 0.00 - 0.04 K/uL    Lymph # 1.5 1.0 - 4.8 K/uL    Mono # 0.8 0.3 - 1.0 K/uL    Eos # 0.4 0.0 - 0.5 K/uL    Baso # 0.02 0.00 - 0.20 K/uL    nRBC 1 (A) 0 /100 WBC    Gran % 64.8 38.0 - 73.0 %    Lymph % 19.1 18.0 - 48.0 %    Mono % 10.7 4.0 - 15.0 %    Eosinophil % 4.7 0.0 - 8.0 %    Basophil % 0.3 0.0 - 1.9 %    Differential Method Automated    Comprehensive metabolic panel   Result Value Ref Range    Sodium 139 136 - 145 mmol/L    Potassium 4.5 3.5 - 5.1 mmol/L    Chloride 102 95 - 110 mmol/L    CO2 29 23 - 29 mmol/L    Glucose 89 70 - 110 mg/dL    BUN 32 (H) 6 - 20 mg/dL    Creatinine <0.3 (L) 0.5 - 1.4 mg/dL    Calcium 9.3 8.7 - 10.5 mg/dL    Total Protein 10.3 (H) 6.0 - 8.4 g/dL    Albumin 3.7 3.5 - 5.2 g/dL    Total Bilirubin 0.7 0.1 - 1.0 mg/dL    Alkaline Phosphatase 187 (H) 55 - 135 U/L    AST 32 10 - 40 U/L    ALT 35 10 - 44 U/L    eGFR >60.0 >60 mL/min/1.73 m^2    Anion Gap 8 8 - 16 mmol/L   Lactic acid, plasma #1   Result Value Ref Range    Lactate (Lactic Acid) 1.7 0.5 - 1.9 mmol/L   Influenza antigen Nasopharyngeal Swab   Result Value Ref Range    Influenza A, Molecular Negative Negative    Influenza B, Molecular Negative Negative    Flu A & B Source Nasal swab    Brain natriuretic peptide   Result Value Ref Range     (H) 0 - 99 pg/mL   Troponin I High Sensitivity   Result Value Ref Range    Troponin I High Sensitivity 40.0 (H) 0.0 - 14.9 pg/mL   Procalcitonin   Result Value Ref Range    Procalcitonin 0.13 0.00 - 0.50 ng/mL   COVID-19 Rapid Screening   Result Value Ref Range     SARS-CoV-2 RNA, Amplification, Qual Negative Negative   ISTAT CREATININE   Result Value Ref Range    POC Creatinine 0.3 (L) 0.5 - 1.4 mg/dL    Sample VENOUS    ISTAT PROCEDURE   Result Value Ref Range    POC PH 7.346 (L) 7.35 - 7.45    POC PCO2 57.3 (HH) 35 - 45 mmHg    POC PO2 69 (L) 80 - 100 mmHg    POC HCO3 31.3 (H) 24 - 28 mmol/L    POC BE 6 -2 to 2 mmol/L    POC SATURATED O2 92 (L) 95 - 100 %    POC Glucose 93 70 - 110 mg/dL    POC Sodium 142 136 - 145 mmol/L    POC Potassium 4.7 3.5 - 5.1 mmol/L    POC TCO2 33 (H) 23 - 27 mmol/L    POC Ionized Calcium 1.29 1.06 - 1.42 mmol/L    POC Hematocrit 34 (L) 36 - 54 %PCV    Rate 16     Sample ARTERIAL     Site RR     Allens Test Pass     DelSys Adult Vent     Mode AC/PRVC     Vt 360     PEEP 10     PiP 42     FiO2 80     Min Vol 17     Sp02 93    POCT glucose   Result Value Ref Range    POC Glucose 85 70 - 110       Imaging Results              CTA Chest Abdomen Pelvis (In process)                      X-Ray Chest AP Portable (Final result)  Result time 07/22/23 19:49:01      Final result by Jayant Junior MD (07/22/23 19:49:01)                   Narrative:    CLINICAL DATA:  Sepsis and shortness of breath    TECHNIQUE:  Views: A single AP view.  Limitations: The images are technically satisfactory.    COMPARISON:  July 8, 2023    FINDINGS:  CARDIOVASCULAR: Normal.    LUNGS AND PLEURA:  1. Infiltrative changes are seen diffusely throughout both lungs. Allowing for technical differences these have increased slightly since last examination.    MEDIASTINAL AND HILAR STRUCTURES: Normal.    OSSEOUS STRUCTURES: Normal.    ADDITIONAL FINDINGS:  1. The tracheostomy tube remains in good position.    IMPRESSION:    1.  Slight increasing infiltrates in the lungs.    This document was created using a voice recognition transcribing system. Incorrect words or phrases may have been missed during proof reading. Please interpret accordingly or contact the radiologist for  clarification if necessary.    Electronically signed by:  Jayant Junior MD  7/22/2023 7:49 PM CDT Workstation: [id number]                                     Medications   meropenem (MERREM) 2 g in sodium chloride 0.9% 100 mL IVPB (2 g Intravenous New Bag 7/22/23 2108)   vancomycin - pharmacy to dose (has no administration in time range)   vancomycin in dextrose 5 % 1 gram/250 mL IVPB 2,000 mg (has no administration in time range)   levalbuterol nebulizer solution 1.25 mg (1.25 mg Nebulization Given 7/22/23 2019)   iohexoL (OMNIPAQUE 350) injection 100 mL (100 mLs Intravenous Given 7/22/23 2132)   dexAMETHasone injection 6 mg (6 mg Intravenous Given 7/22/23 2107)     Medical Decision Making:   Differential Diagnosis:   Includes but not limited to sepsis, pneumonia, pneumothorax, PE, pulmonary edema, COPD exacerbation, respiratory failure, hypoxic  Clinical Tests:   Lab Tests: Ordered and Reviewed  Radiological Study: Ordered and Reviewed  Medical Tests: Ordered and Reviewed  ED Management:  Emergent evaluation 62-year-old female presents emergency department with respiratory distress.  Patient suction by respiratory ventilator settings have been adjusted.  Patient reported to be improved on the ventilator.  Patient with diffuse multi lobar infiltrates.  Concerning for pneumonia.  Patient is significantly hypoxic but has improved with ventilator settings adjusted.  Patient given IV meropenem and IV vancomycin given history of ESBL UTI patient is trach and vent dependent.  Patient at risk for MRSA pneumonia.  Patient will be admitted to the ICU for further care and evaluation of symptoms.  Patient critically ill          Attending Attestation:             Attending ED Notes:   Attending Critical Care:   Critical Care Times:   Direct Patient Care (initial evaluation, reassessments, and time considering the case)................................................................10 minutes.   Additional History from  reviewing old medical records or taking additional history from the family, EMS, PCP, etc.......................10 minutes.   Ordering, Reviewing, and Interpreting Diagnostic Studies...............................................................................................................10 minutes.   Documentation..................................................................................................................................................................................5 minutes.   ==============================================================  · Total Critical Care Time - exclusive of procedural time: 35 minutes.  ==============================================================  Critical care was necessary to treat or prevent imminent or life-threatening deterioration of the following conditions:  Respiratory failure.   Critical care was time spent personally by me on the following activities: obtaining history from patient or relative, examination of patient, review of x-rays / CT sent with the patient, ordering lab, x-rays, and/or EKG, development of treatment plan with patient or relative, ordering and performing treatments and interventions, interpretation of cardiac measurements and re-evaluation of patient's conition.   Critical Care Condition: potentially life-threatening       ED Course as of 07/22/23 2202   Sat Jul 22, 2023   2020 EKG 7:57 p.m. normal sinus rhythm sinus tachycardia rate of 114.  Occasional premature ventricular complexes.  No ST elevation or depression.  No STEMI.  EKG interpreted independently me. [JR]      ED Course User Index  [JR] Terry Nino DO                 Clinical Impression:   Final diagnoses:  [R06.03] Respiratory distress  [R09.02] Hypoxia  [N30.00] Acute cystitis without hematuria  [J18.9] Pneumonia of both lungs due to infectious organism, unspecified part of lung (Primary)  [J96.01] Acute respiratory failure with hypoxia        ED Disposition  Condition    Admit Stable                Terry Nino, DO  07/22/23 5113

## 2023-07-23 NOTE — CARE UPDATE
07/22/23 1088   Patient Assessment/Suction   Level of Consciousness (AVPU) alert   PRE-TX-O2   Device (Oxygen Therapy) ventilator   Oxygen Concentration (%) (S)  50   SpO2 (S)  95 %   Pulse Oximetry Type Continuous   $ Pulse Oximetry - Multiple Charge Pulse Oximetry - Multiple   Pulse 101   Resp (!) 37   Vent Select   Charged w/in last 24h YES   Preset Conventional Ventilator Settings   Ventilation Type VC   Vent Mode A/C   Set Rate 16 BPM   Vt Set 360 mL   PEEP/CPAP 5 cmH20   Pressure Support 0 cmH20   Waveform RAMP   Peak Flow 60 L/min   Plateau Set/Insp. Hold (sec) 0   Trigger Sensitivity Flow/I-Trigger 3 L/min   Patient Ventilator Parameters   Resp Rate Total 36 br/min   Peak Airway Pressure 35 cmH20   Mean Airway Pressure 15 cmH20   Plateau Pressure 0 cmH20   Exhaled Vt 384 mL   Total Ve 13.8 L/m   I:E Ratio Measured 1:1.60   Conventional Ventilator Alarms   Resp Rate High Alarm 50 br/min   Press High Alarm 60 cmH2O   Apnea Rate 16   Apnea Volume (mL) 0 mL   Apnea Oxygen Concentration  100   Apnea Flow Rate (L/min) 60   T Apnea 20 sec(s)   Ready to Wean/Extubation Screen   FIO2<=50 (chart decimal) 0.5   MV<16L (chart vol.) 13.8   PEEP <=8 (chart #) 5   Ready to Wean Parameters   F/VT Ratio<105 (RSBI) (!) 96.35   Transport Patient   $ Transport Tech Time Charge 15 min   Time to transport 15   Oxygen Method Transport Vent   Transport to CT   Toleration Good   ETT Secure Other (see comment)  (Trach secure)   Ambu and mask at bedside Yes     Weaned O2. Will continue to monitor.

## 2023-07-23 NOTE — ASSESSMENT & PLAN NOTE
Due to possible uti and hx of EBSL, will start merrem  F/u u/a and urine culture  Nursing staff in ER had trouble placing cath so patient currently has a pure wick (ICU will attempt to place carroll)   Bladder scan normal  CT abd/pelvis showed no abnormalities with bladder

## 2023-07-23 NOTE — ASSESSMENT & PLAN NOTE
Pulm consulted  Continue vanc and merrem.  Hypothermia possibly related to underlying sepsis.  Follow microbiology results.  Continue intravenous antibiotics.  Will consult ID specialist for opinion.  Discussed with Dr. Mccormack.

## 2023-07-23 NOTE — CONSULTS
07/23/2023      Admit Date: 7/22/2023  Genna Felix  New Patient Consult    Chief Complaint   Patient presents with    Shortness of Breath     Arrived from [place] via [facility] with c/c SOB that started earlier today       History of Present Illness:  Pt is a 63 yo female with hepatic steatosis, PE, GERD, anoxic brain injury dependent on trach/vent and PEG who presented to the ED with desaturation. She was found to have pneumonia and UTI. Pulmonary is consulted for vent management. She is hypothermic requiring warming blanket today. Hx is gathered from chart review. There is no family at bedside.      PFSH:  Past Medical History:   Diagnosis Date    Anoxic brain damage 07/2020    Bedbound 07/2020    Cardiac arrest 07/2020    Cirrhosis     GERD (gastroesophageal reflux disease)     Hemangioma of intra-abdominal structure     Hypertension     Nursing home resident     Protein calorie malnutrition     Pulmonary embolus     Respiratory distress     S/P percutaneous endoscopic gastrostomy (PEG) tube placement 07/2020    Total self-care deficit 07/2020    Tracheostomy dependence     7/2020     Past Surgical History:   Procedure Laterality Date    PEG W/TRACHEOSTOMY PLACEMENT  07/27/2020    SKIN GRAFT      buttocks     Social History     Tobacco Use    Smoking status: Never    Smokeless tobacco: Never   Substance Use Topics    Alcohol use: Not Currently    Drug use: Not Currently     Family History   Problem Relation Age of Onset    Hypertension Mother     No Known Problems Father      Review of patient's allergies indicates:  No Known Allergies    Performance Status:Performance Status:The patient's activity level is bedbound.    Review of Systems:  due to neurologic status/impairments a Review of Systems could not be obtained       Exam:Comprehensive exam done. BP (!) 98/59   Pulse 85   Temp (!) 92.5 °F (33.6 °C) (Core Rectal)   Resp (!) 30   Ht 5' (1.524 m)   Wt 88.7 kg (195 lb 8 oz)   SpO2 (!) 93%    Breastfeeding No   BMI 38.18 kg/m²   Exam included Vitals as listed, and patient's appearance and affect and alertness and mood, oral exam for yeast and hygiene and pharynx lesions and Mallapatti (M) score, neck with inspection for jvd and masses and thyroid abnormalities and lymph nodes (supraclavicular and infraclavicular nodes also examined and noted if abn), chest exam included symmetry and effort and fremitus and percussion and auscultation, cardiac exam included rhythm and gallops and murmur and rubs and jvd and edema, abdominal exam for mass and hepatosplenomegaly and tenderness and hernias and bowel sounds, Musculoskeletal exam with muscle tone and posture and mobility/gait and  strenght, and skin for rashes and cyanosis and pallor and turgor, extremity for clubbing.  Findings were normal except as listed below:  Eyes open, does not track  Unresponsive  Trach w/ vent  HR regular  Breath sounds clear bilaterally  Abd distended and tense, dull to percussion  No edema    Radiographs reviewed: view by direct vision   Imaging Results              CTA Chest Abdomen Pelvis (Final result)  Result time 07/22/23 22:24:45      Final result by Danny Rasmussen DO (07/22/23 22:24:45)                   Narrative:    PROCEDURE:  CT Angiography Chest, Abdomen and Pelvis With Intravenous Contrast    CLINICAL INDICATION:  The patient is 62 years old and is Female; respiratory distress    TECHNIQUE:  Axial computed tomographic angiography images of the chest, abdomen and pelvis with intravenous contrast.  This CT exam was performed using one or more of the following dose reduction techniques:  automated exposure control, adjustment of the mA and/or kV according to patient size, and/or use of iterative reconstruction technique.  MIP reconstructed images were created and reviewed.    DLP: 955 mGycm    CONTRAST:  100mL of Omnipaque 350 was administered intravenously.    COMPARISON:  Chest radiograph of the same day and  CT abdomen and pelvis dated 7/6/2023.    FINDINGS:    VASCULATURE:  AORTA:  No acute findings.  No aortic aneurysm.  No dissection.  PULMONARY ARTERIES:  Unremarkable as visualized.  No pulmonary embolism is identified.  GREAT VESSELS OF AORTIC ARCH:  No acute findings.  No dissection.  No arterial occlusion or significant stenosis.  CELIAC TRUNK AND MESENTERIC ARTERIES:  No acute findings.  No occlusion or significant stenosis.  RENAL ARTERIES:  No acute findings.  No occlusion or significant stenosis.  ILIAC ARTERIES:  No acute findings.  No occlusion or significant stenosis.    CHEST:  LUNGS:  Bilateral interstitial opacities with interlobular septal thickening and consolidation in the right lower lobe.  PLEURAL SPACE:  Small bilateral pleural effusions.  No pneumothorax.  HEART:  Unremarkable.  No cardiomegaly.  No significant pericardial effusion.  MEDIASTINUM:  Mediastinal and bihilar lymphadenopathy.    ABDOMEN:  LIVER:  Hepatic steatosis.  GALLBLADDER AND BILE DUCTS:  Unremarkable.  No calcified stones.  No ductal dilation.  PANCREAS:  Unremarkable.  No ductal dilation.  No mass.  SPLEEN:  Splenomegaly.  ADRENALS:  Unremarkable.  No mass.  KIDNEYS AND URETERS:  Unremarkable.  No hydronephrosis.  No solid mass.  STOMACH AND BOWEL:  Unremarkable.  No obstruction.  No mucosal thickening.    PELVIS:  APPENDIX:  No findings to suggest acute appendicitis.  BLADDER:  Bladder is decompressed.  REPRODUCTIVE:  Prior hysterectomy.    CHEST, ABDOMEN and PELVIS:  INTRAPERITONEAL SPACE:  Unremarkable.  No significant fluid collection.  No free air.  BONES/JOINTS:  Severe heterotopic bone formation arising from the left acetabulum. Milder similar-appearing finding on the right. Advanced left shoulder degenerative changes.  Diffuse osteopenia. Multilevel degenerative changes.  No acute fracture.  No dislocation.  SOFT TISSUES:  Fat-containing umbilical hernia.  LYMPH NODES:  Unremarkable.  No enlarged lymph nodes.  TUBES,  LINES AND DEVICES:  Partially evaluated gastrostomy and tracheostomy tubes.    IMPRESSION:    1.  No acute aortic abnormality or pulmonary embolism.    2.  Bilateral interstitial opacities with interlobular septal thickening and consolidation in the right lower lobe.  Imaging features can be seen with COVID pneumonia, though are nonspecific and can occur with a variety of infectious and noninfectious processes. PneInd.    3.  Mediastinal and bihilar lymphadenopathy.    4.  Small bilateral pleural effusions.    5.  No acute abdominal or pelvic abnormality.    6.  Splenomegaly.    7.  Hepatic steatosis.    Electronically signed by:  Danny Rasmussen DO  7/22/2023 10:24 PM CDT Workstation: [id number]                                     X-Ray Chest AP Portable (Final result)  Result time 07/22/23 19:49:01      Final result by Jayant Junior MD (07/22/23 19:49:01)                   Narrative:    CLINICAL DATA:  Sepsis and shortness of breath    TECHNIQUE:  Views: A single AP view.  Limitations: The images are technically satisfactory.    COMPARISON:  July 8, 2023    FINDINGS:  CARDIOVASCULAR: Normal.    LUNGS AND PLEURA:  1. Infiltrative changes are seen diffusely throughout both lungs. Allowing for technical differences these have increased slightly since last examination.    MEDIASTINAL AND HILAR STRUCTURES: Normal.    OSSEOUS STRUCTURES: Normal.    ADDITIONAL FINDINGS:  1. The tracheostomy tube remains in good position.    IMPRESSION:    1.  Slight increasing infiltrates in the lungs.    This document was created using a voice recognition transcribing system. Incorrect words or phrases may have been missed during proof reading. Please interpret accordingly or contact the radiologist for clarification if necessary.    Electronically signed by:  Jayant Junior MD  7/22/2023 7:49 PM CDT Workstation: [id number]                                        Labs     Recent Labs   Lab 07/23/23  0315   WBC 8.36   HGB 8.7*    HCT 29.9*        Recent Labs   Lab 07/22/23 1934 07/23/23  0025 07/23/23  0315     --  139   K 4.5  --  5.0     --  104   CO2 29  --  27   BUN 32*  --  29*   CREATININE <0.3*  --  0.3*   GLU 89  --  165*   CALCIUM 9.3  --  9.4   MG  --   --  1.8   PHOS  --   --  3.8   AST 32  --  31   ALT 35  --  35   ALKPHOS 187*  --  157*   BILITOT 0.7  --  1.3*   PROT 10.3*  --  9.4*   ALBUMIN 3.7  --  3.4*   PROCAL 0.13  --   --    LACTATE 1.7 1.9  --    *  --   --      Recent Labs   Lab 07/22/23 1947   PH 7.346*   PCO2 57.3*   PO2 69*   HCO3 31.3*     Microbiology Results (last 7 days)       Procedure Component Value Units Date/Time    Blood culture x two cultures. Draw prior to antibiotics. [[id number]] Collected: 07/22/23 2044    Order Status: Completed Specimen: Blood Updated: 07/23/23 0517     Blood Culture, Routine No Growth to date    Narrative:      Aerobic and anaerobic  Collection has been rescheduled by 2MB at 07/22/2023 19:43 Reason: Amairani liz said she will call when she wants cultures drawn  Collection has been rescheduled by 2MB at 07/22/2023 19:43 Reason: Amairani liz said she will call when she wants cultures drawn    Blood culture x two cultures. Draw prior to antibiotics. [[id number]] Collected: 07/22/23 2050    Order Status: Completed Specimen: Blood Updated: 07/23/23 0517     Blood Culture, Routine No Growth to date    Narrative:      Aerobic and anaerobic  Collection has been rescheduled by 2MB at 07/22/2023 19:43 Reason: Amairani liz said she will call when she wants cultures drawn  Collection has been rescheduled by 2MB at 07/22/2023 19:43 Reason: Amairani liz said she will call when she wants cultures drawn            Impression:  Active Hospital Problems    Diagnosis  POA    *Pneumonia of both lungs due to infectious organism [J18.9]  Yes    Urine finding [R82.90]  Yes    Ventilator dependence [Z99.11]  Not Applicable    Essential hypertension [I10]  Yes    Anemia  of chronic disease [D63.8]  Yes    Persistent vegetative state [R40.3]  Yes      Resolved Hospital Problems   No resolved problems to display.               Plan:   Acute on chronic hypoxemic/hypercapneic resp failure  Vegetative state with trach/vent and PEG dependence  Pneumonia  UTI  Normocytic anemia      - ABG reviewed  - continue vent no weaning  - continue antibiotics  - f/u cultures  - monitor H/H  - continue airway clearance regimen  - continue feeds via PEG      The following were evaluated and adjusted as needed: mechanical ventilator settings and weaning status, intravenous fluids and nutritional status, antibiotics, support tubes and access lines and invasive monitoring, and acid base balance and oxygenation needs       Critical Care  - THE PATIENT HAS A HIGH PROBABILITY OF IMMINENT OR LIFE THREATENING DETERIORATION.  Over 50%time of encounter was in direct care at bedside.  Time was 30 to 74 minutes required for patient care.  Time needed for all of the above totaled 35 minutes.    Dora Montesinos MD  Pulmonary & Critical Care Medicine

## 2023-07-23 NOTE — PLAN OF CARE
[facility]  Initial Discharge Assessment       Primary Care Provider: Primary Doctor No    Admission Diagnosis: Pneumonia of both lungs due to infectious organism, unspecified part of lung [J18.9]    Admission Date: 7/22/2023  Expected Discharge Date:     Transition of Care Barriers: (P) None    SW completed initial assessment with patient's sister Amelia Hidalgo via telephone at [phone]. Patient is non-verbal and vent dependent, and is a longterm resident at [facility] and rehab. Family would like for patient to return to the facility upon discharge. As facility provides for all needs, no discharge needs anticipated.     Payor: MEDICARE / Plan: MEDICARE PART A & B / Product Type: Government /     Extended Emergency Contact Information  Primary Emergency Contact: Amelia Hidalgo  Mobile Phone: [phone]  Relation: Sister  Preferred language: English   needed? No  Secondary Emergency Contact: Starr Felix  Mobile Phone: [phone]  Relation: Daughter    Discharge Plan A: (P) Return to nursing home  Discharge Plan B: (P) Return to Nursing Home      [facility] - [address] [address]  Phone: [phone] Fax: [phone]      Initial Assessment (most recent)       Adult Discharge Assessment - 07/23/23 1655          Discharge Assessment    Assessment Type Discharge Planning Assessment     Confirmed/corrected address, phone number and insurance Yes     Confirmed Demographics Correct on Facesheet     Source of Information family (P)      If unable to respond/provide information was family/caregiver contacted? Yes (P)      Contact Name/Number Amelia Hidalgo (sister) - [phone] (P)      When was your last doctors appointment? -- (P)    Sees MD regularly at nursing home    Communicated [place] with patient/caregiver Date not available/Unable to determine (P)      Reason For Admission Pneumonia of both lunds to  due infectious organism (P)      Facility Arrived From: [facility] (P)      Do you expect to return to your current living situation? Yes (P)      Do you have help at home or someone to help you manage your care at home? Yes (P)      Who are your caregiver(s) and their phone number(s)? Facility provides all needs. (P)      Prior to hospitilization cognitive status: Coma/Sedated/Intubated (P)      Current cognitive status: Coma/Sedated/Intubated (P)      Walking or Climbing Stairs -- (P)    N/A - bed bound    Dressing/Bathing bathing difficulty, dependent (P)      Equipment Currently Used at Home ventilator;hospital bed (P)      Readmission within 30 days? Yes (P)      Patient currently being followed by outpatient case management? No (P)      Do you currently have service(s) that help you manage your care at home? Yes (P)      Name and Contact number of agency [facility] re[facility] (P)      Is the pt/caregiver preference to resume services with current agency Yes (P)      Do you take prescription medications? Yes (P)      Do you have prescription coverage? Yes (P)      Coverage Medicaid (P)      Do you have any problems affording any of your prescribed medications? No (P)      Is the patient taking medications as prescribed? yes (P)      Who is going to help you get home at discharge? Facility van (P)      How do you get to doctors appointments? agency (P)      Are you on dialysis? No (P)      Do you take coumadin? No (P)      Discharge Plan A Return to nursing home (P)      Discharge Plan B Return to Nursing Home (P)      DME Needed Upon Discharge  none (P)      Discharge Plan discussed with: Sibling (P)      Name(s) and Number(s) Sister Amelia Hidalgo (see above) (P)      Transition of Care Barriers None (P)

## 2023-07-23 NOTE — PROGRESS NOTES
Pharmacokinetic Initial Assessment: IV Vancomycin    Assessment/Plan:    Initiate intravenous vancomycin with loading dose of 2000 mg once followed by a maintenance dose of vancomycin 1500 mg IV every 12 hours  Desired empiric serum trough concentration is 10 to 20 mcg/mL  Draw vancomycin trough level 60 min prior to fourth dose on 07/24 at approximately 1200  Pharmacy will continue to follow and monitor vancomycin.      Please contact pharmacy at extension [phone] with any questions regarding this assessment.     Thank you for the consult,   Isac Nuñez       Patient brief summary:  Genna Felix is a 62 y.o. female initiated on antimicrobial therapy with IV Vancomycin for treatment of suspected lower respiratory infection    Drug Allergies:   Review of patient's allergies indicates:  No Known Allergies    Actual Body Weight:   88.7 kg    Renal Function:   CrCl cannot be calculated (This lab value cannot be used to calculate CrCl because it is not a number: <0.3).,     CBC (last 72 hours):  Recent Labs   Lab Result Units 07/22/23  1934   WBC K/uL 7.82   Hemoglobin g/dL 9.4*   Hematocrit % 33.5*   Platelets K/uL 224   Gran % % 64.8   Lymph % % 19.1   Mono % % 10.7   Eosinophil % % 4.7   Basophil % % 0.3   Differential Method  Automated       Metabolic Panel (last 72 hours):  Recent Labs   Lab Result Units 07/22/23  1934   Sodium mmol/L 139   Potassium mmol/L 4.5   Chloride mmol/L 102   CO2 mmol/L 29   Glucose mg/dL 89   BUN mg/dL 32*   Creatinine mg/dL <0.3*   Albumin g/dL 3.7   Total Bilirubin mg/dL 0.7   Alkaline Phosphatase U/L 187*   AST U/L 32   ALT U/L 35       Drug levels (last 3 results):  No results for input(s): VANCOMYCINRA, VANCORANDOM, VANCOMYCINPE, VANCOPEAK, VANCOMYCINTR, VANCOTROUGH in the last 72 hours.    Microbiologic Results:  Microbiology Results (last 7 days)       Procedure Component Value Units Date/Time    Blood culture x two cultures. Draw prior to antibiotics. [[id number]] Collected: 07/22/23  2050    Order Status: Sent Specimen: Blood Updated: 07/22/23 2127    Narrative:      Collection has been rescheduled by 2MB at 07/22/2023 19:43 Reason: Amairani liz said she will call when she wants cultures drawn  Collection has been rescheduled by 2MB at 07/22/2023 19:43 Reason: Amairani liz said she will call when she wants cultures drawn    Blood culture x two cultures. Draw prior to antibiotics. [[id number]] Collected: 07/22/23 2044    Order Status: Sent Specimen: Blood Updated: 07/22/23 2127    Narrative:      Collection has been rescheduled by 2MB at 07/22/2023 19:43 Reason: Amairani liz said she will call when she wants cultures drawn  Collection has been rescheduled by 2MB at 07/22/2023 19:43 Reason: Amairani liz said she will call when she wants cultures drawn

## 2023-07-23 NOTE — SUBJECTIVE & OBJECTIVE
Past Medical History:   Diagnosis Date    Anoxic brain damage 07/2020    Bedbound 07/2020    Cardiac arrest 07/2020    Cirrhosis     GERD (gastroesophageal reflux disease)     Hemangioma of intra-abdominal structure     Hypertension     Nursing home resident     Protein calorie malnutrition     Pulmonary embolus     Respiratory distress     S/P percutaneous endoscopic gastrostomy (PEG) tube placement 07/2020    Total self-care deficit 07/2020    Tracheostomy dependence     7/2020       Past Surgical History:   Procedure Laterality Date    PEG W/TRACHEOSTOMY PLACEMENT  07/27/2020    SKIN GRAFT      buttocks       Review of patient's allergies indicates:  No Known Allergies    No current facility-administered medications on file prior to encounter.     Current Outpatient Medications on File Prior to Encounter   Medication Sig    acetaminophen (TYLENOL) 325 MG tablet 2 tablets (650 mg total) by Per G Tube route every 4 (four) hours as needed for Pain or Temperature greater than (100.4F).    acetylcysteine 100 mg/ml, 10%, (MUCOMYST) 100 mg/mL (10 %) nebulizer solution Take 4 mLs by nebulization every 8 (eight) hours.    albuterol-ipratropium (DUO-NEB) 2.5 mg-0.5 mg/3 mL nebulizer solution Take 3 mLs by nebulization every 6 (six) hours. Rescue    arginine/glutamine/calcium bmb (XIOMARA ORAL) 1 Package by PEG Tube route 2 (two) times a day. In 8oz of water    cefTRIAXone (ROCEPHIN) 1 gram injection Inject 1 g into the muscle daily as needed.    cycloSPORINE (RESTASIS) 0.05 % ophthalmic emulsion 1 drop 2 (two) times daily. 0900  2100    dextran 70-hypromellose (ARTIFICIAL TEARS,EVML75-SHSPF,) ophthalmic solution Place 1 drop into both eyes 4 (four) times daily as needed.    ergocalciferol (ERGOCALCIFEROL) 50,000 unit Cap Take 50,000 Units by mouth every Saturday.    ferrous sulfate 300 mg (60 mg iron)/5 mL syrup Take 300 mg by mouth As instructed. Every other day    lactose-reduced food (ISOSOURCE ORAL) 1.5 mg by Per G Tube  route continuous. 1.5 per peg tube at 50ml/hr continuously    linaCLOtide (LINZESS) 145 mcg Cap capsule Take 145 mcg by mouth before breakfast.    miconazole (MICOTIN) 2 % cream Apply topically 2 (two) times daily. Reason:  Tinea pedis for 14 days    midodrine (PROAMATINE) 10 MG tablet 1 tablet (10 mg total) by Per G Tube route 3 (three) times daily. (Patient taking differently: 10 mg by Per G Tube route 3 (three) times daily. 0600  1400  2200)    ondansetron (ZOFRAN) 4 MG tablet 4 mg by Per G Tube route every 8 (eight) hours as needed for Nausea.    polyethylene glycol (GLYCOLAX) 17 gram PwPk Take 17 g by mouth daily as needed.    polyvinyl alcohol-povidone (CLEAR EYES NATURAL TEARS) 0.5-0.6 % Drop Apply 1 drop to eye daily as needed.    [DISCONTINUED] clonazePAM (KLONOPIN) 0.5 MG tablet Take 0.5 mg by mouth 2 (two) times daily.    [DISCONTINUED] oxybutynin (DITROPAN) 5 MG Tab Take 5 mg by mouth 2 (two) times daily.     Family History       Problem Relation (Age of Onset)    Hypertension Mother    No Known Problems Father          Tobacco Use    Smoking status: Never    Smokeless tobacco: Never   Substance and Sexual Activity    Alcohol use: Not Currently    Drug use: Not Currently    Sexual activity: Not Currently     Review of Systems   Unable to perform ROS: Patient nonverbal   Objective:     Vital Signs (Most Recent):  Temp: (S) 96.6 °F (35.9 °C) (MD notified. applying warm blankets and turning up heat in room per VO from Dr. Nino) (07/22/23 2030)  Pulse: 101 (07/22/23 2137)  Resp: (!) 37 (07/22/23 2137)  BP: 110/61 (07/22/23 2110)  SpO2: (S) 95 % (07/22/23 2137) Vital Signs (24h Range):  Temp:  [96.6 °F (35.9 °C)] 96.6 °F (35.9 °C)  Pulse:  [] 101  Resp:  [11-44] 37  SpO2:  [88 %-98 %] 95 %  BP: (100-188)/() 110/61     Weight: 88.7 kg (195 lb 8 oz)  Body mass index is 38.18 kg/m².     Physical Exam  Constitutional:       General: She is not in acute distress.     Appearance: She is not  ill-appearing.   HENT:      Head: Normocephalic and atraumatic.   Cardiovascular:      Rate and Rhythm: Normal rate and regular rhythm.   Pulmonary:      Breath sounds: Wheezing present.      Comments: Vent in place  Abdominal:      General: There is distension.   Neurological:      Mental Status: She is alert.   Psychiatric:      Comments: Patient non-verbal              Significant Labs:   Recent Lab Results  (Last 5 results in the past 24 hours)        07/22/23 1947 07/22/23 1938 07/22/23 1937 07/22/23 1936 07/22/23 1934        Influenza A, Molecular       Negative         Influenza B, Molecular       Negative         Procalcitonin         0.13  Comment: Procalcitonin Result Interpretation:    <=0.50 ng/mL  Systemic infection (sepsis) is not likely. Local bacterial infection   is   possible.    >0.50 and <= 2.00 ng/mL  Systemic infection (sepsis) is possible, but other conditions are   also known   to elevate procalcitonin.     >2.00 ng/mL  Systemic infection (sepsis) is likely unless other causes are known.    >=10.00 ng/mL  Important systemic inflammatory response, almost exclusively due to   severe   bacterial sepsis or septic shock.         Albumin         3.7       Alkaline Phosphatase         187       Allens Test Pass               ALT         35       Anion Gap         8       AST         32       Baso #         0.02       Basophil %         0.3       BILIRUBIN TOTAL         0.7  Comment: For infants and newborns, interpretation of results should be based  on gestational age, weight and in agreement with clinical  observations.    Premature Infant recommended reference ranges:  Up to 24 hours.............<8.0 mg/dL  Up to 48 hours............<12.0 mg/dL  3-5 days..................<15.0 mg/dL  6-29 days.................<15.0 mg/dL         BNP         127  Comment: Values of less than 100 pg/ml are consistent with non-CHF populations.       Site RR               BUN         32       Calcium          9.3       Chloride         102       CO2         29       Creatinine         <0.3       DelSys Adult Vent               Differential Method         Automated       eGFR         >60.0       Eos #         0.4       Eosinophil %         4.7       FiO2 80               Flu A & B Source       Nasal swab         Glucose         89       Gran # (ANC)         5.1       Gran %         64.8       Hematocrit         33.5       Hemoglobin         9.4       Immature Grans (Abs)         0.03  Comment: Mild elevation in immature granulocytes is non specific and   can be seen in a variety of conditions including stress response,   acute inflammation, trauma and pregnancy. Correlation with other   laboratory and clinical findings is essential.         Immature Granulocytes         0.4       Lactate, Donovan         1.7  Comment: Falsely low lactic acid results can be found in samples   containing >=13.0 mg/dL total bilirubin and/or >=3.5 mg/dL   direct bilirubin.         Lymph #         1.5       Lymph %         19.1       MCH         26.3       MCHC         28.1       MCV         94       Min Vol 17               Mode AC/PRVC               Mono #         0.8       Mono %         10.7       MPV         12.2       nRBC         1       PEEP 10               PiP 42               Platelets         224       POC BE 6               POC Creatinine   0.3             POC Glucose 93     85           POC HCO3 31.3               POC Hematocrit 34               POC Ionized Calcium 1.29               POC PCO2 57.3               POC PH 7.346               POC PO2 69               Potassium, Blood Gas 4.7               POC SATURATED O2 92               Sodium, Blood Gas 142               POC TCO2 33               Potassium         4.5       PROTEIN TOTAL         10.3       Rate 16               RBC         3.58       RDW         21.2       Sample ARTERIAL   VENOUS             SARS-CoV-2 RNA, Amplification, Qual       Negative  Comment: This test  utilizes isothermal nucleic acid amplification technology   to   detect the SARS-CoV-2 RdRp nucleic acid segment. The analytical   sensitivity   (limit of detection) is 500 copies/swab.     A POSITIVE result is indicative of the presence of SARS-CoV-2 RNA;   clinical   correlation with patient history and other diagnostic information is   necessary to determine patient infection status.    A NEGATIVE result means that SARS-CoV-2 nucleic acids are not present   above   the limit of detection. A NEGATIVE result should be treated as   presumptive.   It does not rule out the possibility of COVID-19 and should not be   the sole   basis for treatment decisions. If COVID-19 is strongly suspected   based on   clinical and exposure history, re-testing using an alternate   molecular assay   should be considered.     This test is only for use under the Food and Drug Administration s   Emergency   Use Authorization (EUA).     Commercial kits are provided by [employer]. Performance   characteristics of the EUA have been independently verified by   [facility]  _________________________________________________________________   The authorized Fact Sheet for Healthcare Providers and the authorized   Fact   Sheet for Patients of the ID NOW COVID-19 are available on the FDA   website:   https://www.fda.gov/media/135047/download   https://www.fda.gov/media/237522/download           Sodium         139       Sp02 93               Troponin I High Sensitivity         40.0  Comment: Troponin results differ between methods. Do not use   results between Troponin methods interchangeably.    Alkaline Phospatase levels above 400 U/L may   cause false positive results.    Access hsTnI should not be used for patients taking   Asfotase terry (Strensiq).         Vt 360               WBC         7.82                              Significant Imaging:     Imaging Results              CTA Chest Abdomen Pelvis (Final  result)  Result time 07/22/23 22:24:45      Final result by Danny Rasmussen DO (07/22/23 22:24:45)                   Narrative:    PROCEDURE:  CT Angiography Chest, Abdomen and Pelvis With Intravenous Contrast    CLINICAL INDICATION:  The patient is 62 years old and is Female; respiratory distress    TECHNIQUE:  Axial computed tomographic angiography images of the chest, abdomen and pelvis with intravenous contrast.  This CT exam was performed using one or more of the following dose reduction techniques:  automated exposure control, adjustment of the mA and/or kV according to patient size, and/or use of iterative reconstruction technique.  MIP reconstructed images were created and reviewed.    DLP: 955 mGycm    CONTRAST:  100mL of Omnipaque 350 was administered intravenously.    COMPARISON:  Chest radiograph of the same day and CT abdomen and pelvis dated 7/6/2023.    FINDINGS:    VASCULATURE:  AORTA:  No acute findings.  No aortic aneurysm.  No dissection.  PULMONARY ARTERIES:  Unremarkable as visualized.  No pulmonary embolism is identified.  GREAT VESSELS OF AORTIC ARCH:  No acute findings.  No dissection.  No arterial occlusion or significant stenosis.  CELIAC TRUNK AND MESENTERIC ARTERIES:  No acute findings.  No occlusion or significant stenosis.  RENAL ARTERIES:  No acute findings.  No occlusion or significant stenosis.  ILIAC ARTERIES:  No acute findings.  No occlusion or significant stenosis.    CHEST:  LUNGS:  Bilateral interstitial opacities with interlobular septal thickening and consolidation in the right lower lobe.  PLEURAL SPACE:  Small bilateral pleural effusions.  No pneumothorax.  HEART:  Unremarkable.  No cardiomegaly.  No significant pericardial effusion.  MEDIASTINUM:  Mediastinal and bihilar lymphadenopathy.    ABDOMEN:  LIVER:  Hepatic steatosis.  GALLBLADDER AND BILE DUCTS:  Unremarkable.  No calcified stones.  No ductal dilation.  PANCREAS:  Unremarkable.  No ductal dilation.  No  mass.  SPLEEN:  Splenomegaly.  ADRENALS:  Unremarkable.  No mass.  KIDNEYS AND URETERS:  Unremarkable.  No hydronephrosis.  No solid mass.  STOMACH AND BOWEL:  Unremarkable.  No obstruction.  No mucosal thickening.    PELVIS:  APPENDIX:  No findings to suggest acute appendicitis.  BLADDER:  Bladder is decompressed.  REPRODUCTIVE:  Prior hysterectomy.    CHEST, ABDOMEN and PELVIS:  INTRAPERITONEAL SPACE:  Unremarkable.  No significant fluid collection.  No free air.  BONES/JOINTS:  Severe heterotopic bone formation arising from the left acetabulum. Milder similar-appearing finding on the right. Advanced left shoulder degenerative changes.  Diffuse osteopenia. Multilevel degenerative changes.  No acute fracture.  No dislocation.  SOFT TISSUES:  Fat-containing umbilical hernia.  LYMPH NODES:  Unremarkable.  No enlarged lymph nodes.  TUBES, LINES AND DEVICES:  Partially evaluated gastrostomy and tracheostomy tubes.    IMPRESSION:    1.  No acute aortic abnormality or pulmonary embolism.    2.  Bilateral interstitial opacities with interlobular septal thickening and consolidation in the right lower lobe.  Imaging features can be seen with COVID pneumonia, though are nonspecific and can occur with a variety of infectious and noninfectious processes. PneInd.    3.  Mediastinal and bihilar lymphadenopathy.    4.  Small bilateral pleural effusions.    5.  No acute abdominal or pelvic abnormality.    6.  Splenomegaly.    7.  Hepatic steatosis.    Electronically signed by:  Danny Rasmussen DO  7/22/2023 10:24 PM CDT Workstation: [id number]                                     X-Ray Chest AP Portable (Final result)  Result time 07/22/23 19:49:01      Final result by Jayant Junior MD (07/22/23 19:49:01)                   Narrative:    CLINICAL DATA:  Sepsis and shortness of breath    TECHNIQUE:  Views: A single AP view.  Limitations: The images are technically satisfactory.    COMPARISON:  July 8,  2023    FINDINGS:  CARDIOVASCULAR: Normal.    LUNGS AND PLEURA:  1. Infiltrative changes are seen diffusely throughout both lungs. Allowing for technical differences these have increased slightly since last examination.    MEDIASTINAL AND HILAR STRUCTURES: Normal.    OSSEOUS STRUCTURES: Normal.    ADDITIONAL FINDINGS:  1. The tracheostomy tube remains in good position.    IMPRESSION:    1.  Slight increasing infiltrates in the lungs.    This document was created using a voice recognition transcribing system. Incorrect words or phrases may have been missed during proof reading. Please interpret accordingly or contact the radiologist for clarification if necessary.    Electronically signed by:  Jaaynt Junior MD  7/22/2023 7:49 PM CDT Workstation: [id number]

## 2023-07-23 NOTE — NURSING
Carroll catheter placed at 1600, 10cc of cloudy urine produced. Urine sent off for studies. Patient had external catheter attached previously and before beginning carroll catheter insertion, patient was in the process of voiding. Thus, lack of large urine output during insertion was expected. Urine output monitored, but no urine produced after 90 minutes. Upon inspecting, found urine in patient's lap. Attempted to deflate balloon and advance carroll catheter again, but carroll removed in the process.

## 2023-07-23 NOTE — NURSING
Nurses Note -- 4 Eyes      7/23/2023   6:06 AM      Skin assessed during: Admit      [] No Altered Skin Integrity Present    []Prevention Measures Documented      [x] Yes- Altered Skin Integrity Present or Discovered   [x] LDA Added if Not in Epic (Describe Wound)   [] New Altered Skin Integrity was Present on Admit and Documented in LDA   [x] Wound Image Taken    Wound Care Consulted? Yes    Attending Nurse:  MARISELA COLLINS RN     Second RN/Staff Member:  Kirill Alvarenga RN

## 2023-07-23 NOTE — SUBJECTIVE & OBJECTIVE
Interval History:  Patient is hypothermic with body temperature 92.5 degrees Fahrenheit.  Patient is receiving Beth Hugger.  Noncommunicative, on ventilator therapy.    Review of Systems   Unable to perform ROS: Patient nonverbal   Objective:     Vital Signs (Most Recent):  Temp: (!) 92.5 °F (33.6 °C) (07/23/23 0945)  Pulse: 85 (07/23/23 0945)  Resp: (!) 30 (07/23/23 0945)  BP: (!) 98/59 (07/23/23 0945)  SpO2: (!) 93 % (07/23/23 0945) Vital Signs (24h Range):  Temp:  [91.6 °F (33.1 °C)-97 °F (36.1 °C)] 92.5 °F (33.6 °C)  Pulse:  [] 85  Resp:  [2-48] 30  SpO2:  [88 %-98 %] 93 %  BP: ()/() 98/59     Weight: 88.7 kg (195 lb 8 oz)  Body mass index is 38.18 kg/m².    Intake/Output Summary (Last 24 hours) at 7/23/2023 1028  Last data filed at 7/23/2023 0301  Gross per 24 hour   Intake --   Output 145 ml   Net -145 ml         Physical Exam  Constitutional:       General: She is not in acute distress.     Appearance: She is not ill-appearing.   HENT:      Head: Normocephalic and atraumatic.   Cardiovascular:      Rate and Rhythm: Normal rate and regular rhythm.   Pulmonary:      Breath sounds: Wheezing present.      Comments: Vent in place  Abdominal:      General: There is distension.   Neurological:      Mental Status: She is alert.   Psychiatric:      Comments: Patient non-verbal           Significant Labs: All pertinent labs within the past 24 hours have been reviewed.  CBC:   Recent Labs   Lab 07/22/23 1934 07/22/23 1947 07/23/23 0315   WBC 7.82  --  8.36   HGB 9.4*  --  8.7*   HCT 33.5* 34* 29.9*     --  181     CMP:   Recent Labs   Lab 07/22/23 1934 07/23/23 0315    139   K 4.5 5.0    104   CO2 29 27   GLU 89 165*   BUN 32* 29*   CREATININE <0.3* 0.3*   CALCIUM 9.3 9.4   PROT 10.3* 9.4*   ALBUMIN 3.7 3.4*   BILITOT 0.7 1.3*   ALKPHOS 187* 157*   AST 32 31   ALT 35 35   ANIONGAP 8 8     Coagulation: No results for input(s): PT, INR, APTT in the last 48 hours.  Lactic Acid:    Recent Labs   Lab 07/22/23 1934 07/23/23  0025   LACTATE 1.7 1.9     Troponin:   Recent Labs   Lab 07/22/23 1934   TROPONINIHS 40.0*     TSH:   Recent Labs   Lab 07/06/23  1041   TSH 2.510     Urine Studies: No results for input(s): COLORU, APPEARANCEUA, PHUR, SPECGRAV, PROTEINUA, GLUCUA, KETONESU, BILIRUBINUA, OCCULTUA, NITRITE, UROBILINOGEN, LEUKOCYTESUR, RBCUA, WBCUA, BACTERIA, SQUAMEPITHEL, HYALINECASTS in the last 48 hours.    Invalid input(s): [facility]  Microbiology Results (last 7 days)       Procedure Component Value Units Date/Time    Blood culture x two cultures. Draw prior to antibiotics. [[id number]] Collected: 07/22/23 2044    Order Status: Completed Specimen: Blood Updated: 07/23/23 0517     Blood Culture, Routine No Growth to date    Narrative:      Aerobic and anaerobic  Collection has been rescheduled by 2MB at 07/22/2023 19:43 Reason: Amairani liz said she will call when she wants cultures drawn  Collection has been rescheduled by 2MB at 07/22/2023 19:43 Reason: Amairani liz said she will call when she wants cultures drawn    Blood culture x two cultures. Draw prior to antibiotics. [[id number]] Collected: 07/22/23 2050    Order Status: Completed Specimen: Blood Updated: 07/23/23 0517     Blood Culture, Routine No Growth to date    Narrative:      Aerobic and anaerobic  Collection has been rescheduled by 2MB at 07/22/2023 19:43 Reason: Amairani liz said she will call when she wants cultures drawn  Collection has been rescheduled by 2MB at 07/22/2023 19:43 Reason: Amairani liz said she will call when she wants cultures drawn          Significant Imaging:   CTA Chest:  1.  No acute aortic abnormality or pulmonary embolism.  2.  Bilateral interstitial opacities with interlobular septal thickening and consolidation in the right lower lobe.  Imaging features can be seen with COVID pneumonia, though are nonspecific and can occur with a variety of infectious and noninfectious processes.  PneInd.  3.  Mediastinal and bihilar lymphadenopathy.  4.  Small bilateral pleural effusions.  5.  No acute abdominal or pelvic abnormality.  6.  Splenomegaly.  7.  Hepatic steatosis.    CXR:   Slight increasing infiltrates in the lungs.

## 2023-07-24 PROBLEM — L89.302 PRESSURE INJURY OF BUTTOCK, STAGE 2: Status: ACTIVE | Noted: 2023-07-24

## 2023-07-24 LAB
ALBUMIN SERPL BCP-MCNC: 3.2 G/DL (ref 3.5–5.2)
ALP SERPL-CCNC: 141 U/L (ref 55–135)
ALT SERPL W/O P-5'-P-CCNC: 29 U/L (ref 10–44)
ANION GAP SERPL CALC-SCNC: 5 MMOL/L (ref 8–16)
AST SERPL-CCNC: 23 U/L (ref 10–40)
BASOPHILS # BLD AUTO: 0 K/UL (ref 0–0.2)
BASOPHILS NFR BLD: 0 % (ref 0–1.9)
BILIRUB SERPL-MCNC: 1.2 MG/DL (ref 0.1–1)
BUN SERPL-MCNC: 24 MG/DL (ref 6–20)
CALCIUM SERPL-MCNC: 9.1 MG/DL (ref 8.7–10.5)
CHLORIDE SERPL-SCNC: 112 MMOL/L (ref 95–110)
CO2 SERPL-SCNC: 27 MMOL/L (ref 23–29)
CREAT SERPL-MCNC: 0.4 MG/DL (ref 0.5–1.4)
DIFFERENTIAL METHOD: ABNORMAL
EOSINOPHIL # BLD AUTO: 0 K/UL (ref 0–0.5)
EOSINOPHIL NFR BLD: 0.2 % (ref 0–8)
ERYTHROCYTE [DISTWIDTH] IN BLOOD BY AUTOMATED COUNT: 21.9 % (ref 11.5–14.5)
EST. GFR  (NO RACE VARIABLE): >60 ML/MIN/1.73 M^2
GLUCOSE SERPL-MCNC: 95 MG/DL (ref 70–110)
HCT VFR BLD AUTO: 26 % (ref 37–48.5)
HGB BLD-MCNC: 7.6 G/DL (ref 12–16)
IMM GRANULOCYTES # BLD AUTO: 0 K/UL (ref 0–0.04)
IMM GRANULOCYTES NFR BLD AUTO: 0 % (ref 0–0.5)
LYMPHOCYTES # BLD AUTO: 1.2 K/UL (ref 1–4.8)
LYMPHOCYTES NFR BLD: 25.4 % (ref 18–48)
MAGNESIUM SERPL-MCNC: 1.9 MG/DL (ref 1.6–2.6)
MCH RBC QN AUTO: 26.5 PG (ref 27–31)
MCHC RBC AUTO-ENTMCNC: 29.2 G/DL (ref 32–36)
MCV RBC AUTO: 91 FL (ref 82–98)
MONOCYTES # BLD AUTO: 0.4 K/UL (ref 0.3–1)
MONOCYTES NFR BLD: 7.7 % (ref 4–15)
NEUTROPHILS # BLD AUTO: 3 K/UL (ref 1.8–7.7)
NEUTROPHILS NFR BLD: 66.7 % (ref 38–73)
NRBC BLD-RTO: 1 /100 WBC
PHOSPHATE SERPL-MCNC: 3.2 MG/DL (ref 2.7–4.5)
PLATELET # BLD AUTO: 186 K/UL (ref 150–450)
PMV BLD AUTO: 12.7 FL (ref 9.2–12.9)
POTASSIUM SERPL-SCNC: 4.1 MMOL/L (ref 3.5–5.1)
PROT SERPL-MCNC: 8.6 G/DL (ref 6–8.4)
RBC # BLD AUTO: 2.87 M/UL (ref 4–5.4)
SODIUM SERPL-SCNC: 144 MMOL/L (ref 136–145)
VANCOMYCIN TROUGH SERPL-MCNC: 38.9 UG/ML
WBC # BLD AUTO: 4.56 K/UL (ref 3.9–12.7)

## 2023-07-24 PROCEDURE — 94799 UNLISTED PULMONARY SVC/PX: CPT

## 2023-07-24 PROCEDURE — 94003 VENT MGMT INPAT SUBQ DAY: CPT

## 2023-07-24 PROCEDURE — 80202 ASSAY OF VANCOMYCIN: CPT | Performed by: FAMILY MEDICINE

## 2023-07-24 PROCEDURE — 63600175 PHARM REV CODE 636 W HCPCS: Performed by: INTERNAL MEDICINE

## 2023-07-24 PROCEDURE — 94761 N-INVAS EAR/PLS OXIMETRY MLT: CPT

## 2023-07-24 PROCEDURE — 99291 PR CRITICAL CARE, E/M 30-74 MINUTES: ICD-10-PCS | Mod: ,,, | Performed by: INTERNAL MEDICINE

## 2023-07-24 PROCEDURE — 25000003 PHARM REV CODE 250: Performed by: INTERNAL MEDICINE

## 2023-07-24 PROCEDURE — 80053 COMPREHEN METABOLIC PANEL: CPT | Performed by: FAMILY MEDICINE

## 2023-07-24 PROCEDURE — 87077 CULTURE AEROBIC IDENTIFY: CPT | Performed by: INTERNAL MEDICINE

## 2023-07-24 PROCEDURE — 87205 SMEAR GRAM STAIN: CPT | Performed by: INTERNAL MEDICINE

## 2023-07-24 PROCEDURE — 99233 PR SUBSEQUENT HOSPITAL CARE,LEVL III: ICD-10-PCS | Mod: ,,, | Performed by: STUDENT IN AN ORGANIZED HEALTH CARE EDUCATION/TRAINING PROGRAM

## 2023-07-24 PROCEDURE — 63600175 PHARM REV CODE 636 W HCPCS: Performed by: FAMILY MEDICINE

## 2023-07-24 PROCEDURE — 87186 SC STD MICRODIL/AGAR DIL: CPT | Performed by: INTERNAL MEDICINE

## 2023-07-24 PROCEDURE — 20000000 HC ICU ROOM

## 2023-07-24 PROCEDURE — 83735 ASSAY OF MAGNESIUM: CPT | Performed by: FAMILY MEDICINE

## 2023-07-24 PROCEDURE — 99900026 HC AIRWAY MAINTENANCE (STAT)

## 2023-07-24 PROCEDURE — 99291 CRITICAL CARE FIRST HOUR: CPT | Mod: ,,, | Performed by: INTERNAL MEDICINE

## 2023-07-24 PROCEDURE — 99900035 HC TECH TIME PER 15 MIN (STAT)

## 2023-07-24 PROCEDURE — 94640 AIRWAY INHALATION TREATMENT: CPT

## 2023-07-24 PROCEDURE — 80202 ASSAY OF VANCOMYCIN: CPT | Mod: 91 | Performed by: INTERNAL MEDICINE

## 2023-07-24 PROCEDURE — 85025 COMPLETE CBC W/AUTO DIFF WBC: CPT | Performed by: FAMILY MEDICINE

## 2023-07-24 PROCEDURE — 87070 CULTURE OTHR SPECIMN AEROBIC: CPT | Performed by: INTERNAL MEDICINE

## 2023-07-24 PROCEDURE — 99221 1ST HOSP IP/OBS SF/LOW 40: CPT | Mod: ,,, | Performed by: FAMILY MEDICINE

## 2023-07-24 PROCEDURE — 99221 PR INITIAL HOSPITAL CARE,LEVL I: ICD-10-PCS | Mod: ,,, | Performed by: FAMILY MEDICINE

## 2023-07-24 PROCEDURE — 36415 COLL VENOUS BLD VENIPUNCTURE: CPT | Performed by: INTERNAL MEDICINE

## 2023-07-24 PROCEDURE — 84100 ASSAY OF PHOSPHORUS: CPT | Performed by: FAMILY MEDICINE

## 2023-07-24 PROCEDURE — 36415 COLL VENOUS BLD VENIPUNCTURE: CPT | Performed by: FAMILY MEDICINE

## 2023-07-24 PROCEDURE — 25000242 PHARM REV CODE 250 ALT 637 W/ HCPCS: Performed by: FAMILY MEDICINE

## 2023-07-24 PROCEDURE — 99233 SBSQ HOSP IP/OBS HIGH 50: CPT | Mod: ,,, | Performed by: STUDENT IN AN ORGANIZED HEALTH CARE EDUCATION/TRAINING PROGRAM

## 2023-07-24 PROCEDURE — 25000003 PHARM REV CODE 250: Performed by: FAMILY MEDICINE

## 2023-07-24 PROCEDURE — 99900031 HC PATIENT EDUCATION (STAT)

## 2023-07-24 PROCEDURE — 27000221 HC OXYGEN, UP TO 24 HOURS

## 2023-07-24 RX ADMIN — MEROPENEM 1 G: 1 INJECTION, POWDER, FOR SOLUTION INTRAVENOUS at 12:07

## 2023-07-24 RX ADMIN — CARBOXYMETHYLCELLULOSE SODIUM 1 DROP: 5 SOLUTION/ DROPS OPHTHALMIC at 09:07

## 2023-07-24 RX ADMIN — ACETYLCYSTEINE 2 ML: 200 SOLUTION ORAL; RESPIRATORY (INHALATION) at 07:07

## 2023-07-24 RX ADMIN — IPRATROPIUM BROMIDE AND ALBUTEROL SULFATE 3 ML: 2.5; .5 SOLUTION RESPIRATORY (INHALATION) at 01:07

## 2023-07-24 RX ADMIN — MUPIROCIN 1 G: 20 OINTMENT TOPICAL at 12:07

## 2023-07-24 RX ADMIN — MICONAZOLE NITRATE: 20 CREAM TOPICAL at 12:07

## 2023-07-24 RX ADMIN — ENOXAPARIN SODIUM 40 MG: 100 INJECTION SUBCUTANEOUS at 05:07

## 2023-07-24 RX ADMIN — CARBOXYMETHYLCELLULOSE SODIUM 1 DROP: 5 SOLUTION/ DROPS OPHTHALMIC at 12:07

## 2023-07-24 RX ADMIN — VANCOMYCIN HYDROCHLORIDE 1500 MG: 1.5 INJECTION, POWDER, LYOPHILIZED, FOR SOLUTION INTRAVENOUS at 03:07

## 2023-07-24 RX ADMIN — MIDODRINE HYDROCHLORIDE 10 MG: 2.5 TABLET ORAL at 12:07

## 2023-07-24 RX ADMIN — ACETYLCYSTEINE 2 ML: 200 SOLUTION ORAL; RESPIRATORY (INHALATION) at 01:07

## 2023-07-24 RX ADMIN — MEROPENEM 1 G: 1 INJECTION, POWDER, FOR SOLUTION INTRAVENOUS at 09:07

## 2023-07-24 RX ADMIN — CARBOXYMETHYLCELLULOSE SODIUM 1 DROP: 5 SOLUTION/ DROPS OPHTHALMIC at 05:07

## 2023-07-24 RX ADMIN — IPRATROPIUM BROMIDE AND ALBUTEROL SULFATE 3 ML: 2.5; .5 SOLUTION RESPIRATORY (INHALATION) at 07:07

## 2023-07-24 RX ADMIN — MIDODRINE HYDROCHLORIDE 10 MG: 2.5 TABLET ORAL at 06:07

## 2023-07-24 RX ADMIN — MICONAZOLE NITRATE: 20 CREAM TOPICAL at 09:07

## 2023-07-24 RX ADMIN — Medication 4000 UNITS: at 12:07

## 2023-07-24 RX ADMIN — MEROPENEM 1 G: 1 INJECTION, POWDER, FOR SOLUTION INTRAVENOUS at 05:07

## 2023-07-24 RX ADMIN — MIDODRINE HYDROCHLORIDE 10 MG: 2.5 TABLET ORAL at 05:07

## 2023-07-24 NOTE — PROGRESS NOTES
07/24/2023      Admit Date: 7/22/2023  Genna Felix  New Patient Consult    Chief Complaint   Patient presents with    Shortness of Breath     Arrived from [place] via [facility] with c/c SOB that started earlier today       History of Present Illness:  Pt is a 63 yo female with hepatic steatosis, PE, GERD, anoxic brain injury dependent on trach/vent and PEG who presented to the ED with desaturation. She was found to have pneumonia and UTI. Pulmonary is consulted for vent management. She is hypothermic requiring warming blanket today. Hx is gathered from chart review. There is no family at bedside.    7/24/23: Minimal urine output. Unable to place [place]. Obtaining bladder scan -- unable to quantify, but full of fluid. Re-attempting Hamilton.      PFSH:  Past Medical History:   Diagnosis Date    Anoxic brain damage 07/2020    Bedbound 07/2020    Cardiac arrest 07/2020    Cirrhosis     GERD (gastroesophageal reflux disease)     Hemangioma of intra-abdominal structure     Hypertension     Nursing home resident     Protein calorie malnutrition     Pulmonary embolus     Respiratory distress     S/P percutaneous endoscopic gastrostomy (PEG) tube placement 07/2020    Total self-care deficit 07/2020    Tracheostomy dependence     7/2020     Past Surgical History:   Procedure Laterality Date    PEG W/TRACHEOSTOMY PLACEMENT  07/27/2020    SKIN GRAFT      buttocks     Social History     Tobacco Use    Smoking status: Never    Smokeless tobacco: Never   Substance Use Topics    Alcohol use: Not Currently    Drug use: Not Currently     Family History   Problem Relation Age of Onset    Hypertension Mother     No Known Problems Father      Review of patient's allergies indicates:  No Known Allergies    Performance Status:Performance Status:The patient's activity level is bedbound.    Review of Systems:  due to neurologic status/impairments a     Exam:Comprehensive exam done. BP (!) 93/52   Pulse 89   Temp (!) 95.2 °F (35.1 °C)  (Axillary)   Resp (!) 29   Ht 5' (1.524 m)   Wt 89 kg (196 lb 3.4 oz)   SpO2 (!) 93%   Breastfeeding No   BMI 38.32 kg/m²       PHYSICAL EXAM  GENERAL:  NAD.  HEENT: EOMI. PERRLA. Opens eyes spontaneously, but no response to confrontation.  NECK: No cervical adenopathy palpated. No JVD or HJR.  HEART: Regular rate and rhythm. No murmur or gallop auscultated.  LUNGS: Coarse airway sounds, but no crackles or wheezing on anterior auscultation.  ABDOMEN: Bowel sounds present. Soft, non-tender, non-distended.  EXTREMITIES: Normal muscle tone and joint movement, no cyanosis or clubbing.   LYMPHATICS: No adenopathy palpated, no edema.  SKIN: Dry, intact, no lesions.   NEURO: Unresponsive.  PSYCH: Unable to assess.    Imaging Results              CTA Chest Abdomen Pelvis (Final result)  Result time 07/22/23 22:24:45      Final result by Danny Rasmussen DO (07/22/23 22:24:45)                   Narrative:    PROCEDURE:  CT Angiography Chest, Abdomen and Pelvis With Intravenous Contrast    CLINICAL INDICATION:  The patient is 62 years old and is Female; respiratory distress    TECHNIQUE:  Axial computed tomographic angiography images of the chest, abdomen and pelvis with intravenous contrast.  This CT exam was performed using one or more of the following dose reduction techniques:  automated exposure control, adjustment of the mA and/or kV according to patient size, and/or use of iterative reconstruction technique.  MIP reconstructed images were created and reviewed.    DLP: 955 mGycm    CONTRAST:  100mL of Omnipaque 350 was administered intravenously.    COMPARISON:  Chest radiograph of the same day and CT abdomen and pelvis dated 7/6/2023.    FINDINGS:    VASCULATURE:  AORTA:  No acute findings.  No aortic aneurysm.  No dissection.  PULMONARY ARTERIES:  Unremarkable as visualized.  No pulmonary embolism is identified.  GREAT VESSELS OF AORTIC ARCH:  No acute findings.  No dissection.  No arterial occlusion or  significant stenosis.  CELIAC TRUNK AND MESENTERIC ARTERIES:  No acute findings.  No occlusion or significant stenosis.  RENAL ARTERIES:  No acute findings.  No occlusion or significant stenosis.  ILIAC ARTERIES:  No acute findings.  No occlusion or significant stenosis.    CHEST:  LUNGS:  Bilateral interstitial opacities with interlobular septal thickening and consolidation in the right lower lobe.  PLEURAL SPACE:  Small bilateral pleural effusions.  No pneumothorax.  HEART:  Unremarkable.  No cardiomegaly.  No significant pericardial effusion.  MEDIASTINUM:  Mediastinal and bihilar lymphadenopathy.    ABDOMEN:  LIVER:  Hepatic steatosis.  GALLBLADDER AND BILE DUCTS:  Unremarkable.  No calcified stones.  No ductal dilation.  PANCREAS:  Unremarkable.  No ductal dilation.  No mass.  SPLEEN:  Splenomegaly.  ADRENALS:  Unremarkable.  No mass.  KIDNEYS AND URETERS:  Unremarkable.  No hydronephrosis.  No solid mass.  STOMACH AND BOWEL:  Unremarkable.  No obstruction.  No mucosal thickening.    PELVIS:  APPENDIX:  No findings to suggest acute appendicitis.  BLADDER:  Bladder is decompressed.  REPRODUCTIVE:  Prior hysterectomy.    CHEST, ABDOMEN and PELVIS:  INTRAPERITONEAL SPACE:  Unremarkable.  No significant fluid collection.  No free air.  BONES/JOINTS:  Severe heterotopic bone formation arising from the left acetabulum. Milder similar-appearing finding on the right. Advanced left shoulder degenerative changes.  Diffuse osteopenia. Multilevel degenerative changes.  No acute fracture.  No dislocation.  SOFT TISSUES:  Fat-containing umbilical hernia.  LYMPH NODES:  Unremarkable.  No enlarged lymph nodes.  TUBES, LINES AND DEVICES:  Partially evaluated gastrostomy and tracheostomy tubes.    IMPRESSION:    1.  No acute aortic abnormality or pulmonary embolism.    2.  Bilateral interstitial opacities with interlobular septal thickening and consolidation in the right lower lobe.  Imaging features can be seen with COVID  pneumonia, though are nonspecific and can occur with a variety of infectious and noninfectious processes. PneInd.    3.  Mediastinal and bihilar lymphadenopathy.    4.  Small bilateral pleural effusions.    5.  No acute abdominal or pelvic abnormality.    6.  Splenomegaly.    7.  Hepatic steatosis.    Electronically signed by:  Danny Rasmussen DO  7/22/2023 10:24 PM CDT Workstation: [id number]                                     X-Ray Chest AP Portable (Final result)  Result time 07/22/23 19:49:01      Final result by Jayant Junior MD (07/22/23 19:49:01)                   Narrative:    CLINICAL DATA:  Sepsis and shortness of breath    TECHNIQUE:  Views: A single AP view.  Limitations: The images are technically satisfactory.    COMPARISON:  July 8, 2023    FINDINGS:  CARDIOVASCULAR: Normal.    LUNGS AND PLEURA:  1. Infiltrative changes are seen diffusely throughout both lungs. Allowing for technical differences these have increased slightly since last examination.    MEDIASTINAL AND HILAR STRUCTURES: Normal.    OSSEOUS STRUCTURES: Normal.    ADDITIONAL FINDINGS:  1. The tracheostomy tube remains in good position.    IMPRESSION:    1.  Slight increasing infiltrates in the lungs.    This document was created using a voice recognition transcribing system. Incorrect words or phrases may have been missed during proof reading. Please interpret accordingly or contact the radiologist for clarification if necessary.    Electronically signed by:  Jayant Junior MD  7/22/2023 7:49 PM CDT Workstation: [id number]                                        Labs     Recent Labs   Lab 07/24/23  0335   WBC 4.56   HGB 7.6*   HCT 26.0*        Recent Labs   Lab 07/24/23  0335      K 4.1   *   CO2 27   BUN 24*   CREATININE 0.4*   GLU 95   CALCIUM 9.1   MG 1.9   PHOS 3.2   AST 23   ALT 29   ALKPHOS 141*   BILITOT 1.2*   PROT 8.6*   ALBUMIN 3.2*     Recent Labs   Lab 07/23/23  1150   PH 7.398   PCO2 43.4   PO2  67*   HCO3 26.7     Microbiology Results (last 7 days)       Procedure Component Value Units Date/Time    Urine culture [[id number]] Collected: 07/23/23 1602    Order Status: Completed Specimen: Urine Updated: 07/24/23 0637     Urine Culture, Routine No growth to date    Narrative:      Specimen Source->Urine    Blood culture x two cultures. Draw prior to antibiotics. [[id number]] Collected: 07/22/23 2044    Order Status: Completed Specimen: Blood Updated: 07/23/23 2232     Blood Culture, Routine No Growth to date      No Growth to date    Narrative:      Aerobic and anaerobic  Collection has been rescheduled by 2MB at 07/22/2023 19:43 Reason: Amairani liz said she will call when she wants cultures drawn  Collection has been rescheduled by 2MB at 07/22/2023 19:43 Reason: Amairani liz said she will call when she wants cultures drawn    Blood culture x two cultures. Draw prior to antibiotics. [[id number]] Collected: 07/22/23 2050    Order Status: Completed Specimen: Blood Updated: 07/23/23 2232     Blood Culture, Routine No Growth to date      No Growth to date    Narrative:      Aerobic and anaerobic  Collection has been rescheduled by 2MB at 07/22/2023 19:43 Reason: Amairani liz said she will call when she wants cultures drawn  Collection has been rescheduled by 2MB at 07/22/2023 19:43 Reason: Amairani liz said she will call when she wants cultures drawn    MRSA Screen by PCR [[id number]] Collected: 07/23/23 1610    Order Status: Completed Specimen: Nasopharyngeal Swab from Nasal Updated: 07/23/23 1750     MRSA SCREEN BY PCR Negative    Culture, Respiratory with Gram Stain [[id number]]     Order Status: No result Specimen: Respiratory             Impression and Plan:  Active Hospital Problems    Diagnosis  POA    *Pneumonia of both lungs due to infectious organism [J18.9]  Yes    Hypoxia [R09.02]  Yes    Urine finding [R82.90]  Yes    Ventilator dependence [Z99.11]  Not Applicable    Essential hypertension [I10]   Yes    Anemia of chronic disease [D63.8]  Yes    Persistent vegetative state [R40.3]  Yes      Resolved Hospital Problems   No resolved problems to display.     Acute on chronic hypoxemic/hypercapneic resp failure  Vegetative state with trach/vent and PEG dependence  Ventilator-associated pneumonia  UTI, complicated  Normocytic anemia  - ABG reviewed  - continue vent no weaning  - continue antibiotics as per ID, including vanc for now until vanc-resistant Enterococcus ruled out   - f/u cultures  - monitor H/H  - continue airway clearance regimen  - continue feeds via PEG    Upon my evaluation, this patient had a high probability of imminent or life-threatening deterioration, which required my direct attention, intervention, and personal management.    I have personally provided at least 35 minutes of critical care time exclusive of time spent on separately billable procedures. Over 50% of the time of this encounter was spent in direct care at the bedside. Time includes review of laboratory data, radiology results, discussion with consultants, and monitoring for potential decompensation. Interventions were performed as documented above.    Yonas Ritchie MD  Pulmonary and Critical Care Medicine  [facility] / [facility]  Date of Service: 07/24/2023  10:26 AM

## 2023-07-24 NOTE — CONSULTS
[facility]  Adult Nutrition   Consult Note (Nutrition Support Management)    SUMMARY     Recommendations  1) Initiate TF of Glucerna 1.5 at 10 mls/h advancing by 10 mls every 4 hours to the goal rate of 30 mls/h to provide 1080 kcals, 59 g protein and 546 mls water.   2) FWF's of 30 mls every 4 hours to clear tube.   3) RD to monitor rate/tolerance, weight, labs, etc. and make recommendations prn.    Goals:   1) Nutrition provision to meet 100% of pts energy and protein needs.   2) Pt to tolerate TF at the goal rate.    Nutrition Goal Status: progressing towards goal    Communication of RD Recs: discussed on rounds    Dietitian Rounds Brief  Consult for TF: Observed pt during rounds today.    Diet order:   Current Diet Order: no diet order      Evaluation of Received Nutrient/Fluid Intake  Energy Calories Required: not meeting needs  Protein Required: not meeting needs  Fluid Required: meeting needs  Tolerance: other (see comments) (tube feeding)     % Intake of Estimated Energy Needs: 0%  % Meal Intake: NPO      Intake/Output Summary (Last 24 hours) at 7/24/2023 1222  Last data filed at 7/24/2023 0531  Gross per 24 hour   Intake 2700.05 ml   Output 1100 ml   Net 1600.05 ml        Anthropometrics  Temp: (!) 95.2 °F (35.1 °C)  Height: 5' (152.4 cm)  Height (inches): 60 in  Weight Method: Bed Scale  Weight: 89 kg (196 lb 3.4 oz)  Weight (lb): 196.21 lb  Ideal Body Weight (IBW), Female: 100 lb  % Ideal Body Weight, Female (lb): 195.5 %  BMI (Calculated): 38.3  BMI Grade: 35 - 39.9 - obesity - grade II       Estimated/Assessed Needs  Weight Used For Calorie Calculations: 89 kg (196 lb 3.4 oz)  Energy Calorie Requirements (kcal): 979-1246 kcals/day (11-14 kcals/kg ABW)  Energy Need Method: Kcal/kg  Protein Requirements: 68-90g/day (1.5-2 g/kg IBW)  Weight Used For Protein Calculations: 45 kg (99 lb 3.3 oz)     Estimated Fluid Requirement Method: RDA Method  RDA Method (mL): 979       Reason for  Assessment  Reason For Assessment: consult, new tube feeding  Diagnosis: other (see comments) (Pneumonia of both lungs due to infectious organism)  Relevant Medical History: Anemia of chronic disease, Persistent vegetative state, Essential hypertension, Ventilator dependence, Urine finding, Hypoxia  Interdisciplinary Rounds: attended    Nutrition/Diet History  Patient Reported Diet/Restrictions/Preferences: no oral intake  Spiritual, Cultural Beliefs, Cheondoism Practices, Values that Affect Care: no  Food Allergies: NKFA  Factors Affecting Nutritional Intake: NPO    Nutrition Risk Screen  Nutrition Risk Screen: tube feeding or parenteral nutrition       Altered Skin Integrity 07/22/23 2350 Left Buttocks #1 Partial thickness tissue loss. Shallow open ulcer with a red or pink wound bed, without slough. Intact or Open/Ruptured Serum-filled blister.-Wound Image: Images linked  MST Score: 0  Have you recently lost weight without trying?: No  Weight loss score: 0  Have you been eating poorly because of a decreased appetite?: No  Appetite score: 0       Weight History:  Wt Readings from Last 5 Encounters:   07/24/23 89 kg (196 lb 3.4 oz)   07/06/23 88.6 kg (195 lb 5.2 oz)   06/12/23 77.1 kg (170 lb)   10/20/22 77.1 kg (170 lb)   08/26/22 77.2 kg (170 lb 3.1 oz)        Lab/Procedures/Meds: Pertinent Labs/Meds Reviewed    Medications:Pertinent Medications Reviewed  Scheduled Meds:   acetylcysteine 200 mg/ml (20%)  2 mL Nebulization TID WAKE    albuterol-ipratropium  3 mL Nebulization Q6H    carboxymethylcellulose  1 drop Both Eyes QID    enoxparin  40 mg Subcutaneous Daily    ergocalciferol  4,000 Units Per G Tube Daily    ferrous sulfate  300 mg Per G Tube Every other day    meropenem (MERREM) IVPB  1 g Intravenous Q8H    miconazole   Topical (Top) BID    midodrine  10 mg Per G Tube TID AC    mupirocin   Nasal BID    vancomycin (VANCOCIN) IV (PEDS and ADULTS)  1,500 mg Intravenous Q12H     Continuous Infusions:   sodium  chloride 0.9% 100 mL/hr at 07/23/23 0230     PRN Meds:.calcium gluconate IVPB, calcium gluconate IVPB, calcium gluconate IVPB, magnesium sulfate IVPB, magnesium sulfate IVPB, ondansetron, polyethylene glycol, potassium chloride **AND** potassium chloride **AND** potassium chloride, sodium chloride 0.9%, sodium phosphate IVPB, sodium phosphate IVPB, sodium phosphate IVPB, Pharmacy to dose Vancomycin consult **AND** vancomycin - pharmacy to dose    Labs: Pertinent Labs Reviewed  Clinical Chemistry:  Recent Labs   Lab 07/23/23  0315 07/24/23  0335    144   K 5.0 4.1    112*   CO2 27 27   * 95   BUN 29* 24*   CREATININE 0.3* 0.4*   CALCIUM 9.4 9.1   PROT 9.4* 8.6*   ALBUMIN 3.4* 3.2*   BILITOT 1.3* 1.2*   ALKPHOS 157* 141*   AST 31 23   ALT 35 29   ANIONGAP 8 5*   MG 1.8 1.9   PHOS 3.8 3.2     CBC:   Recent Labs   Lab 07/24/23  0335   WBC 4.56   RBC 2.87*   HGB 7.6*   HCT 26.0*      MCV 91   MCH 26.5*   MCHC 29.2*     Cardiac Profile:  Recent Labs   Lab 07/22/23  1934   *     Thyroid & Parathyroid:  Recent Labs   Lab 07/23/23  0315   TSH 1.880       Monitor and Evaluation  Food and Nutrient Intake: enteral nutrition intake  Food and Nutrient Adminstration: enteral and parenteral nutrition administration  Knowledge/Beliefs/Attitudes: food and nutrition knowledge/skill, beliefs and attitudes  Physical Activity and Function: nutrition-related ADLs and IADLs, factors affecting access to physical activity  Anthropometric Measurements: weight, weight change, body mass index  Biochemical Data, Medical Tests and Procedures: lipid profile, inflammatory profile, glucose/endocrine profile, gastrointestinal profile, electrolyte and renal panel  Nutrition-Focused Physical Findings: overall appearance     Nutrition Risk  Level of Risk/Frequency of Follow-up: high     Nutrition Follow-Up  RD Follow-up?: Yes      Estrella Jo, JEFFERY 07/24/2023 12:22 PM

## 2023-07-24 NOTE — ASSESSMENT & PLAN NOTE
MD to review, Lorna is aware.     VINCE patient looking to reestablish care.    Patient with Hypoxic Respiratory failure which is Chronic.  she is vent dependent. Supplemental oxygen was provided and noted- Vent Mode: A/CMV-VC  Oxygen Concentration (%):  [35] 35  Resp Rate Total:  [24 br/min-43 br/min] 24 br/min  Vt Set:  [350 mL] 350 mL  PEEP/CPAP:  [4.3 cmH20-8.2 cmH20] 5.3 cmH20.   Signs/symptoms of respiratory failure include- tachypnea and increased work of breathing. Contributing diagnoses includes - Aspiration and Pneumonia Labs and images were reviewed. Patient Has not had a recent ABG. Will treat underlying causes.

## 2023-07-24 NOTE — SUBJECTIVE & OBJECTIVE
Interval History:  On ventilator, no acute distress, hypothermia has resolved.  Receiving intravenous vancomycin and Merrem antibiotic therapy.    Review of Systems   Unable to perform ROS: Patient nonverbal   Objective:     Vital Signs (Most Recent):  Temp: (!) 95.2 °F (35.1 °C) (07/24/23 0315)  Pulse: 83 (07/24/23 0724)  Resp: (!) 33 (07/24/23 0724)  BP: (!) 93/52 (07/24/23 0600)  SpO2: (!) 94 % (07/24/23 0724) Vital Signs (24h Range):  Temp:  [92.5 °F (33.6 °C)-97.4 °F (36.3 °C)] 95.2 °F (35.1 °C)  Pulse:  [] 83  Resp:  [26-40] 33  SpO2:  [90 %-97 %] 94 %  BP: ()/(47-64) 93/52     Weight: 89 kg (196 lb 3.4 oz)  Body mass index is 38.32 kg/m².    Intake/Output Summary (Last 24 hours) at 7/24/2023 0930  Last data filed at 7/24/2023 0531  Gross per 24 hour   Intake 2700.05 ml   Output 1100 ml   Net 1600.05 ml           Physical Exam  Constitutional:       General: She is not in acute distress.     Appearance: She is not ill-appearing.   HENT:      Head: Normocephalic and atraumatic.   Cardiovascular:      Rate and Rhythm: Normal rate and regular rhythm.   Pulmonary:      Breath sounds: Wheezing present.      Comments: Vent in place  Abdominal:      General: There is distension.   Neurological:      Mental Status: She is alert.   Psychiatric:      Comments: Patient non-verbal           Significant Labs: All pertinent labs within the past 24 hours have been reviewed.  CBC:   Recent Labs   Lab 07/22/23 1934 07/22/23 1947 07/23/23 0315 07/24/23 0335   WBC 7.82  --  8.36 4.56   HGB 9.4*  --  8.7* 7.6*   HCT 33.5* 34* 29.9* 26.0*     --  181 186       CMP:   Recent Labs   Lab 07/22/23  1934 07/23/23  0315 07/24/23  0335    139 144   K 4.5 5.0 4.1    104 112*   CO2 29 27 27   GLU 89 165* 95   BUN 32* 29* 24*   CREATININE <0.3* 0.3* 0.4*   CALCIUM 9.3 9.4 9.1   PROT 10.3* 9.4* 8.6*   ALBUMIN 3.7 3.4* 3.2*   BILITOT 0.7 1.3* 1.2*   ALKPHOS 187* 157* 141*   AST 32 31 23   ALT 35 35 29    ANIONGAP 8 8 5*       Coagulation: No results for input(s): PT, INR, APTT in the last 48 hours.  Lactic Acid:   Recent Labs   Lab 07/22/23 1934 07/23/23  0025   LACTATE 1.7 1.9       Troponin:   Recent Labs   Lab 07/22/23 1934   TROPONINIHS 40.0*       TSH:   Recent Labs   Lab 07/23/23  0315   TSH 1.880       Urine Studies:   Recent Labs   Lab 07/23/23  1602   COLORU Yellow   APPEARANCEUA Hazy*   PHUR 7.0   SPECGRAV 1.010   PROTEINUA 1+*   GLUCUA Negative   KETONESU Negative   BILIRUBINUA Negative   OCCULTUA 3+*   NITRITE Negative   UROBILINOGEN Negative   LEUKOCYTESUR 3+*   RBCUA 65*   WBCUA >100*   BACTERIA Rare   SQUAMEPITHEL 2   HYALINECASTS 81*     Microbiology Results (last 7 days)       Procedure Component Value Units Date/Time    Urine culture [[id number]] Collected: 07/23/23 1602    Order Status: Completed Specimen: Urine Updated: 07/24/23 0637     Urine Culture, Routine No growth to date    Narrative:      Specimen Source->Urine    Blood culture x two cultures. Draw prior to antibiotics. [[id number]] Collected: 07/22/23 2044    Order Status: Completed Specimen: Blood Updated: 07/23/23 2232     Blood Culture, Routine No Growth to date      No Growth to date    Narrative:      Aerobic and anaerobic  Collection has been rescheduled by 2MB at 07/22/2023 19:43 Reason: Amairani liz said she will call when she wants cultures drawn  Collection has been rescheduled by 2MB at 07/22/2023 19:43 Reason: Amairani liz said she will call when she wants cultures drawn    Blood culture x two cultures. Draw prior to antibiotics. [[id number]] Collected: 07/22/23 2050    Order Status: Completed Specimen: Blood Updated: 07/23/23 2232     Blood Culture, Routine No Growth to date      No Growth to date    Narrative:      Aerobic and anaerobic  Collection has been rescheduled by 2MB at 07/22/2023 19:43 Reason: Amairani liz said she will call when she wants cultures drawn  Collection has been rescheduled by 2MB at 07/22/2023  19:43 Reason: Rn   hamilton shalonda said she will call when she wants cultures drawn    MRSA Screen by PCR [[id number]] Collected: 07/23/23 1610    Order Status: Completed Specimen: Nasopharyngeal Swab from Nasal Updated: 07/23/23 1750     MRSA SCREEN BY PCR Negative    Culture, Respiratory with Gram Stain [[id number]]     Order Status: No result Specimen: Respiratory           Significant Imaging:   CTA Chest:  1.  No acute aortic abnormality or pulmonary embolism.  2.  Bilateral interstitial opacities with interlobular septal thickening and consolidation in the right lower lobe.  Imaging features can be seen with COVID pneumonia, though are nonspecific and can occur with a variety of infectious and noninfectious processes. PneInd.  3.  Mediastinal and bihilar lymphadenopathy.  4.  Small bilateral pleural effusions.  5.  No acute abdominal or pelvic abnormality.  6.  Splenomegaly.  7.  Hepatic steatosis.    CXR:   Slight increasing infiltrates in the lungs.

## 2023-07-24 NOTE — PROGRESS NOTES
Progress Note  Infectious Disease    Reason for Consult:  Hypothermia, sepsis    HPI: Genna Felix is a 62 y.o. female resident of [facility], known to ID service from prior visits, w PMHx of MAICO 2020, vegetative state, vent dependent, tracheostomy, PEG, PE, HTN, GERD, anemia, osteopenia, acute cholecystitis status post IR drainage in January '22, right arm infection in August '22, MDRs infections like Pseudomonas , Acinetobacter , MRSA, Proteus mirabilis ESBL etc.    She has multiple hospitalizations.   Most recent hospitalization was in the beginning of July.  She had x2 Proteus mirabilis ESBL in urine, Serratia marcescens in sputum, and blood cultures had coag-negative staph on 07/06/2023.  Patient completed treatment with meropenem.    This time, patient is sent to ED because of decreased oxygenation, pulse ox around 87%.  CTA ruled out PE, but found interstitial opacities septal thickening and consolidation of right lower lobe.    She is hypothermic, which is her usual sign of sepsis/infection, needing a Beth Hugger.  She is admitted in ICU, intubated through tracheostomy, FiO2 of 70%, receiving vancomycin and meropenem.  Secretions are pale and yellow.  Sputum cultures are not sent yet, I just did.  Procalcitonin normal at 0.13.  ,  which is slightly higher than usual, blood cultures NGTD    7/24 ([place]): Interim reviewed, discussed with Dr Mccormack, patient seen and examined at bedside, remains trach to vent, FiO2 70%, white thick secretions. Micro reviewed, MRSA nasal negative, blood cultures x 2 no growth to date pending final. Respiratory culture to be collected. UA yesterday with pyuria, pending culture.       Antibiotics (From admission, onward)      Start     Stop Route Frequency Ordered    07/23/23 1300  vancomycin 1.5 g in dextrose 5 % 250 mL IVPB (ready to mix)        Note to Pharmacy: Ht: 5' (1.524 m)  Wt: 88.7 kg (195 lb 8 oz)  CrCl cannot be calculated (This lab value cannot  be used to calculate CrCl because it is not a number: <0.3).  Body mass index is 38.18 kg/m².    -- IV Every 12 hours (non-standard times) 07/23/23 0125    07/23/23 0900  mupirocin 2 % ointment         07/28 0859 Nasl 2 times daily 07/23/23 0032    07/23/23 0500  meropenem 1 g in sodium chloride 0.9 % 100 mL IVPB (ready to mix system)         -- IV Every 8 hours (non-standard times) 07/23/23 0227    07/22/23 2105  vancomycin - pharmacy to dose  (vancomycin IVPB (PEDS and ADULTS))        [facility].    -- IV pharmacy to manage frequency 07/22/23 2006          Antifungals (From admission, onward)      Start     Stop Route Frequency Ordered    07/23/23 0900  miconazole 2 % cream         -- Top 2 times daily 07/23/23 0227            Review of patient's allergies indicates:  No Known Allergies  Past Medical History:   Diagnosis Date    Anoxic brain damage 07/2020    Bedbound 07/2020    Cardiac arrest 07/2020    Cirrhosis     GERD (gastroesophageal reflux disease)     Hemangioma of intra-abdominal structure     Hypertension     Nursing home resident     Protein calorie malnutrition     Pulmonary embolus     Respiratory distress     S/P percutaneous endoscopic gastrostomy (PEG) tube placement 07/2020    Total self-care deficit 07/2020    Tracheostomy dependence     7/2020     Past Surgical History:   Procedure Laterality Date    PEG W/TRACHEOSTOMY PLACEMENT  07/27/2020    SKIN GRAFT      buttocks     Social History     Socioeconomic History    Marital status:    Tobacco Use    Smoking status: Never    Smokeless tobacco: Never   Substance and Sexual Activity    Alcohol use: Not Currently    Drug use: Not Currently    Sexual activity: Not Currently     Family History   Problem Relation Age of Onset    Hypertension Mother     No Known Problems Father          Review of Systems:   na    EXAM & DIAGNOSTICS REVIEWED:   Vitals:     Temp:  [95 °F (35 °C)-97.4 °F (36.3 °C)]   Temp: (!) 95.2  °F (35.1 °C) (07/24/23 0315)  Pulse: 89 (07/24/23 0940)  Resp: (!) 29 (07/24/23 0940)  BP: (!) 93/52 (07/24/23 0600)  SpO2: (!) 93 % (07/24/23 0940)    Intake/Output Summary (Last 24 hours) at 7/24/2023 1001  Last data filed at 7/24/2023 0531  Gross per 24 hour   Intake 2700.05 ml   Output 1100 ml   Net 1600.05 ml     Vent Mode: A/C  Oxygen Concentration (%):  [70] 70  Resp Rate Total:  [22 br/min-42 br/min] 29 br/min  Vt Set:  [360 mL] 360 mL  PEEP/CPAP:  [5 cmH20] 5 cmH20  Pressure Support:  [0 cmH20] 0 cmH20  Mean Airway Pressure:  [6.7 tzC17-31 cmH20] 13 cmH20     General:  Frail elderly lady, laying in bed, intubated, Beth Hugger on  Eyes:  Anicteric, L eye with hyperemia   ENT:  Difficult to examine.  Dry lips.  Neck:  supple, no masses or adenopathy appreciated.  Tracheostomy to vent.  Lungs: R base rhonchi  Heart:  RRR, no gallop/murmur/rub noted  Abd:  Slightly distended, hypoactive bowel sounds, no pain, PEG in place  :  Purewick, no urinary outpt, about to check bladder scan    Musc:  Joints without effusion, swelling, erythema, synovitis; positive for muscle wasting.   Skin:  See pictures.  Positive for dry skin throughout.  No palmar or plantar lesions. No subungual petechiae  Neuro:        Gaze  towards the right upper lung field  Psych    Lymphatic:       Extrem: No edema, erythema, phlebitis, cellulitis, warm and well perfused  VAD:       Isolation:  Contact isolation/[place] resident  Wound:         Lines/Tubes/Drains:    General Labs reviewed:  Recent Labs   Lab 07/22/23 1934 07/22/23 1947 07/23/23 0315 07/24/23  0335   WBC 7.82  --  8.36 4.56   HGB 9.4*  --  8.7* 7.6*   HCT 33.5* 34* 29.9* 26.0*     --  181 186       Recent Labs   Lab 07/22/23  1934 07/23/23  0315 07/24/23  0335    139 144   K 4.5 5.0 4.1    104 112*   CO2 29 27 27   BUN 32* 29* 24*   CREATININE <0.3* 0.3* 0.4*   CALCIUM 9.3 9.4 9.1   PROT 10.3* 9.4* 8.6*   BILITOT 0.7 1.3* 1.2*   ALKPHOS 187* 157* 141*    ALT 35 35 29   AST 32 31 23     No results for input(s): CRP in the last 168 hours.    Estimated Creatinine Clearance: 148.5 mL/min (A) (based on SCr of 0.4 mg/dL (L)).      Micro:  Microbiology Results (last 7 days)       Procedure Component Value Units Date/Time    Urine culture [[id number]] Collected: 07/23/23 1602    Order Status: Completed Specimen: Urine Updated: 07/24/23 0637     Urine Culture, Routine No growth to date    Narrative:      Specimen Source->Urine    Blood culture x two cultures. Draw prior to antibiotics. [[id number]] Collected: 07/22/23 2044    Order Status: Completed Specimen: Blood Updated: 07/23/23 2232     Blood Culture, Routine No Growth to date      No Growth to date    Narrative:      Aerobic and anaerobic  Collection has been rescheduled by 2MB at 07/22/2023 19:43 Reason: Amairani liz said she will call when she wants cultures drawn  Collection has been rescheduled by 2MB at 07/22/2023 19:43 Reason: Amairani liz said she will call when she wants cultures drawn    Blood culture x two cultures. Draw prior to antibiotics. [[id number]] Collected: 07/22/23 2050    Order Status: Completed Specimen: Blood Updated: 07/23/23 2232     Blood Culture, Routine No Growth to date      No Growth to date    Narrative:      Aerobic and anaerobic  Collection has been rescheduled by 2MB at 07/22/2023 19:43 Reason: Amairani liz said she will call when she wants cultures drawn  Collection has been rescheduled by 2MB at 07/22/2023 19:43 Reason: Amairani liz said she will call when she wants cultures drawn    MRSA Screen by PCR [[id number]] Collected: 07/23/23 1610    Order Status: Completed Specimen: Nasopharyngeal Swab from Nasal Updated: 07/23/23 1750     MRSA SCREEN BY PCR Negative    Culture, Respiratory with Gram Stain [[id number]]     Order Status: No result Specimen: Respiratory             Imaging Reviewed:  Chest x-ray 07/22/2023.      Slight increasing infiltrates in the lungs.    CT  07/22/2023.     1.  No acute aortic abnormality or pulmonary embolism.  2.  Bilateral interstitial opacities with interlobular septal thickening and consolidation in the right lower lobe.  Imaging features can be seen with COVID pneumonia, though are nonspecific and can occur with a variety of infectious and noninfectious processes. PneInd.  3.  Mediastinal and bihilar lymphadenopathy.  4.  Small bilateral pleural effusions.  5.  No acute abdominal or pelvic abnormality.  6.  Splenomegaly.  7.  Hepatic steatosis.      Cardiology:    IMPRESSION & PLAN     Sepsis, hypothermia, hypoxemia (neg PE) likely pneumonia (VAP) vs UTI   MRSA nasal swab not detected   Urine culture no growth to date, pending final   Blood cultures x 2 no growth to date pending final     2. Resident of [facility], known to ID service from prior visits, w PMHx of MAICO 2020, vegetative state, vent dependent, tracheostomy, PEG, PE, HTN, GERD, anemia, osteopenia, acute cholecystitis status post IR drainage in January '22, right arm infection in August '22, MDRs infections like Pseudomonas , Acinetobacter , MRSA, Proteus mirabilis ESBL etc.Splenomegaly, hepato steatosis,      Recommendations:  Please collect respiratory culture  Bladder scan, might need Hamilton catheter   Continue Vancomycin IV, keep level 15-20, will likely dc tomorrow   Meropenem 1g IV q8h for now  Follow cultures   Aspiration precautions     D/w ICU nursing, Dr Ritchie, Dr Siddiqi       Medical Decision Making during this encounter was  [_] Low Complexity  [_] Moderate Complexity  [ xxx ] High Complexity

## 2023-07-24 NOTE — PROGRESS NOTES
[facility]  Progress Note    Patient Name: Genna Felix  MRN: [id number]  Patient Class: IP- Inpatient   Admission Date: 7/22/2023  Length of Stay: 2 days  Attending Physician: Geeta Siddiqi MD  Primary Care Provider: Primary Doctor No        Subjective:     Principal Problem:Pneumonia of both lungs due to infectious organism        HPI:  Patient presents to ER due to respiratory distress.     Spoke with sister, Amelia. States that she was told her oxygen was dropping low at Ms. Felix's nursing home, [place]. Was transferred to the ER. Called [facility] for more info and spoke with Ms. Yousif (a nurse who got sign out from the day team about patient). Was told that Respiratory was trying to improve her sats throughout the day however it never improved above 87%. Patient ws then transferred to ER. Staff noticed urine output was dirty with sediment so u/a and urine culture ordered.      Overview/Hospital Course:  No notes on file    Interval History:  On ventilator, no acute distress, hypothermia has resolved.  Receiving intravenous vancomycin and Merrem antibiotic therapy.    Review of Systems   Unable to perform ROS: Patient nonverbal   Objective:     Vital Signs (Most Recent):  Temp: (!) 95.2 °F (35.1 °C) (07/24/23 0315)  Pulse: 83 (07/24/23 0724)  Resp: (!) 33 (07/24/23 0724)  BP: (!) 93/52 (07/24/23 0600)  SpO2: (!) 94 % (07/24/23 0724) Vital Signs (24h Range):  Temp:  [92.5 °F (33.6 °C)-97.4 °F (36.3 °C)] 95.2 °F (35.1 °C)  Pulse:  [] 83  Resp:  [26-40] 33  SpO2:  [90 %-97 %] 94 %  BP: ()/(47-64) 93/52     Weight: 89 kg (196 lb 3.4 oz)  Body mass index is 38.32 kg/m².    Intake/Output Summary (Last 24 hours) at 7/24/2023 0930  Last data filed at 7/24/2023 0531  Gross per 24 hour   Intake 2700.05 ml   Output 1100 ml   Net 1600.05 ml           Physical Exam  Constitutional:       General: She is not in acute distress.     Appearance: She is not ill-appearing.    HENT:      Head: Normocephalic and atraumatic.   Cardiovascular:      Rate and Rhythm: Normal rate and regular rhythm.   Pulmonary:      Breath sounds: Wheezing present.      Comments: Vent in place  Abdominal:      General: There is distension.   Neurological:      Mental Status: She is alert.   Psychiatric:      Comments: Patient non-verbal           Significant Labs: All pertinent labs within the past 24 hours have been reviewed.  CBC:   Recent Labs   Lab 07/22/23 1934 07/22/23 1947 07/23/23 0315 07/24/23  0335   WBC 7.82  --  8.36 4.56   HGB 9.4*  --  8.7* 7.6*   HCT 33.5* 34* 29.9* 26.0*     --  181 186       CMP:   Recent Labs   Lab 07/22/23 1934 07/23/23 0315 07/24/23  0335    139 144   K 4.5 5.0 4.1    104 112*   CO2 29 27 27   GLU 89 165* 95   BUN 32* 29* 24*   CREATININE <0.3* 0.3* 0.4*   CALCIUM 9.3 9.4 9.1   PROT 10.3* 9.4* 8.6*   ALBUMIN 3.7 3.4* 3.2*   BILITOT 0.7 1.3* 1.2*   ALKPHOS 187* 157* 141*   AST 32 31 23   ALT 35 35 29   ANIONGAP 8 8 5*       Coagulation: No results for input(s): PT, INR, APTT in the last 48 hours.  Lactic Acid:   Recent Labs   Lab 07/22/23 1934 07/23/23  0025   LACTATE 1.7 1.9       Troponin:   Recent Labs   Lab 07/22/23 1934   TROPONINIHS 40.0*       TSH:   Recent Labs   Lab 07/23/23 0315   TSH 1.880       Urine Studies:   Recent Labs   Lab 07/23/23  1602   COLORU Yellow   APPEARANCEUA Hazy*   PHUR 7.0   SPECGRAV 1.010   PROTEINUA 1+*   GLUCUA Negative   KETONESU Negative   BILIRUBINUA Negative   OCCULTUA 3+*   NITRITE Negative   UROBILINOGEN Negative   LEUKOCYTESUR 3+*   RBCUA 65*   WBCUA >100*   BACTERIA Rare   SQUAMEPITHEL 2   HYALINECASTS 81*     Microbiology Results (last 7 days)       Procedure Component Value Units Date/Time    Urine culture [[id number]] Collected: 07/23/23 1602    Order Status: Completed Specimen: Urine Updated: 07/24/23 0637     Urine Culture, Routine No growth to date    Narrative:      Specimen Source->Urine    Blood  culture x two cultures. Draw prior to antibiotics. [[id number]] Collected: 07/22/23 2044    Order Status: Completed Specimen: Blood Updated: 07/23/23 2232     Blood Culture, Routine No Growth to date      No Growth to date    Narrative:      Aerobic and anaerobic  Collection has been rescheduled by 2MB at 07/22/2023 19:43 Reason: Amairani liz said she will call when she wants cultures drawn  Collection has been rescheduled by 2MB at 07/22/2023 19:43 Reason: Amairani liz said she will call when she wants cultures drawn    Blood culture x two cultures. Draw prior to antibiotics. [[id number]] Collected: 07/22/23 2050    Order Status: Completed Specimen: Blood Updated: 07/23/23 2232     Blood Culture, Routine No Growth to date      No Growth to date    Narrative:      Aerobic and anaerobic  Collection has been rescheduled by 2MB at 07/22/2023 19:43 Reason: Amairani liz said she will call when she wants cultures drawn  Collection has been rescheduled by 2MB at 07/22/2023 19:43 Reason: Amairani liz said she will call when she wants cultures drawn    MRSA Screen by PCR [[id number]] Collected: 07/23/23 1610    Order Status: Completed Specimen: Nasopharyngeal Swab from Nasal Updated: 07/23/23 1750     MRSA SCREEN BY PCR Negative    Culture, Respiratory with Gram Stain [[id number]]     Order Status: No result Specimen: Respiratory           Significant Imaging:   CTA Chest:  1.  No acute aortic abnormality or pulmonary embolism.  2.  Bilateral interstitial opacities with interlobular septal thickening and consolidation in the right lower lobe.  Imaging features can be seen with COVID pneumonia, though are nonspecific and can occur with a variety of infectious and noninfectious processes. PneInd.  3.  Mediastinal and bihilar lymphadenopathy.  4.  Small bilateral pleural effusions.  5.  No acute abdominal or pelvic abnormality.  6.  Splenomegaly.  7.  Hepatic steatosis.    CXR:   Slight increasing infiltrates in the  lungs.      Assessment/Plan:      * Pneumonia of both lungs due to infectious organism  Pulm consulted  Continue vanc and merrem.  Hypothermia possibly related to underlying sepsis.  Follow microbiology results.  Continue intravenous antibiotics.  Will consult ID specialist for opinion.  Discussed with Dr. Mccormack.      Urine finding  Due to possible uti and hx of EBSL, will start merrem  F/u u/a and urine culture  Nursing staff in ER had trouble placing cath so patient currently has a pure wick (ICU will attempt to place carroll)   Bladder scan normal  CT abd/pelvis showed no abnormalities with bladder.      Ventilator dependence  Follow pulmonary recommendations.      Difficult Carroll catheter placement        Essential hypertension  This was listed in medical history but it doesn't look like patient takes anything for it  Continue to monitor      Persistent vegetative state  Turn patient q2 hours  Shower patient daily  Consult nutrition for diet recs    Anemia of chronic disease  Continue home med of iron QOD      Discussed with Dr. Schneider who is planning to discontinue vanco meth since tomorrow.  Continue intravenous meropenem.  VTE Risk Mitigation (From admission, onward)         Ordered     enoxaparin injection 40 mg  Daily         07/23/23 0225     IP VTE HIGH RISK PATIENT  Once         07/23/23 0225     Place sequential compression device  Until discontinued         07/23/23 0225                Discharge Planning   [place]: 7/26/23     Code Status: Full Code   Is the patient medically ready for discharge?:     Reason for patient still in hospital (select all that apply): Patient trending condition and Consult recommendations  Discharge Plan A: Return to nursing home                  Geeta Siddiqi MD  Department of Hospital Medicine   [facility]

## 2023-07-24 NOTE — CARE UPDATE
07/23/23 1943   Patient Assessment/Suction   Level of Consciousness (AVPU) responds to pain   Respiratory Effort Normal;Unlabored   Expansion/Accessory Muscles/Retractions no use of accessory muscles   All Lung Fields Breath Sounds equal bilaterally;coarse   Rhythm/Pattern, Respiratory assisted mechanically   Cough Frequency with stimulation   Cough Type assisted   Suction Method oral;tracheal   $ Suction Charges Inline Suction Procedure Stat Charge   Secretions Amount moderate   Secretions Color pale;yellow   Secretions Characteristics thick   Skin Integrity   $ Wound Care Tech Time 15 min   Area Observed Neck under tracheostomy   Skin Appearance without discoloration   Barrier used? Other (see comments)   Barrier Changed? Yes   PRE-TX-O2   Device (Oxygen Therapy) ventilator   Oxygen Concentration (%) 70   SpO2 (!) 93 %   Pulse Oximetry Type Continuous   $ Pulse Oximetry - Multiple Charge Pulse Oximetry - Multiple   Pulse 82   Resp (!) 32   BP (!) 90/54   Aerosol Therapy   $ Aerosol Therapy Charges Aerosol Treatment   Daily Review of Necessity (SVN) completed   Respiratory Treatment Status (SVN) given   Treatment Route (SVN) in-line;ventilator   Patient Position (SVN) HOB elevated   Post Treatment Assessment (SVN) breath sounds improved;congestion decreased   Signs of Intolerance (SVN) none   Adult Surgical Airway Shiley Cuffed 6.0/ 75mm   No placement date or time found.   Present Prior to Hospital Arrival?: Yes  Type: Tracheostomy  Brand: Shiley  Airway Device Style: Cuffed  Airway Device Size: 6.0/ 75mm   Cuff Inflation? Inflated   Status Secured   Site Assessment Dry;Clean   Site Care Cleansed;Dried;Dressing applied   Inner Cannula Care Cleansed/dried   Ties Assessment Clean;Dry;Intact;Secure   Airway Safety   Is Ambu Bag and Mask with Patient? Yes, Adult Ambu Bag and Mask   Respiratory Interventions   Airway/Ventilation Management airway patency maintained   Vent Select   Conventional Vent Y   Charged w/in  last 24h YES   Preset Conventional Ventilator Settings   Vent ID [id number]   Vent Type    Ventilation Type VC   Vent Mode A/C   Humidity HME   Set Rate 16 BPM   Vt Set 360 mL   PEEP/CPAP 5 cmH20   Pressure Support 0 cmH20   Waveform RAMP   Peak Flow 50 L/min   Plateau Set/Insp. Hold (sec) 0   Trigger Sensitivity Flow/I-Trigger 2.7 L/min   Patient Ventilator Parameters   Resp Rate Total 33 br/min   Peak Airway Pressure 29 cmH20   Mean Airway Pressure 15 cmH20   Plateau Pressure 0 cmH20   Exhaled Vt 395 mL   Total Ve 12 L/m   I:E Ratio Measured 1:1.60   Tube Type Trach   Conventional Ventilator Alarms   Alarms On Y   Resp Rate High Alarm 50 br/min   Press High Alarm 55 cmH2O   Apnea Rate 10   Apnea Volume (mL) 0 mL   Apnea Oxygen Concentration  100   Apnea Flow Rate (L/min) 50   T Apnea 25 sec(s)   Ready to Wean/Extubation Screen   FIO2<=50 (chart decimal) (!) 0.7   MV<16L (chart vol.) 12   PEEP <=8 (chart #) 5   Ready to Wean Parameters   F/VT Ratio<105 (RSBI) (!) 81.01   Education   $ Education Ventilator Oxygen;Trach Care;Suction;15 min   Respiratory Evaluation   $ Care Plan Tech Time 15 min   $ Eval/Re-eval Charges Re-evaluation   Evaluation For New Orders

## 2023-07-24 NOTE — PLAN OF CARE
Problem: Infection  Goal: Absence of Infection Signs and Symptoms  Outcome: Ongoing, Progressing     Problem: Adult Inpatient Plan of Care  Goal: Absence of Hospital-Acquired Illness or Injury  Outcome: Ongoing, Progressing     Problem: Fluid Imbalance (Pneumonia)  Goal: Fluid Balance  Outcome: Ongoing, Progressing     Problem: Infection (Pneumonia)  Goal: Resolution of Infection Signs and Symptoms  Outcome: Ongoing, Progressing     Problem: Impaired Wound Healing  Goal: Optimal Wound Healing  Outcome: Ongoing, Progressing

## 2023-07-24 NOTE — PLAN OF CARE
Problem: Feeding Intolerance (Enteral Nutrition)  Goal: Feeding Tolerance  Outcome: Ongoing, Progressing  Intervention: Prevent and Manage Feeding Intolerance  Flowsheets (Taken 7/24/2023 1226)  Nutrition Support Management: tube feeding initiated

## 2023-07-24 NOTE — HOSPITAL COURSE
Genna Felix is a 62 year old female with a past medical history of anoxic brain injury s/p G tube and tracheostomy, PE, GERD, HTN, hepatic steatosis, obesity, and anemia who presented with severe sepsis with acute respiratory failure requiring mechanical ventilation. Respiratory cultures show  Pseudomonas. ID has been consulted and started Avycaz. She is to complete five days of Zerbaxa. Pulmonary has also been consulted for ventilator management. Her course has been complicated by tracheostomy cuff leak; Pulmonary replaced the cuff 7/27. She is intermittently hypoxic and it has been difficult weaning the ventilator during her course (the patient was not chronically ventilated on admission). She also developed SVT for which scheduled metoprolol has been ordered. Cardiology was consulted. She also was noted to have a right shoulder dislocation on CTA for which Orthopedic Surgery has been consulted. She has been unable to be weaned from mechanical ventilation at this time; she was discharged back to [facility] 8/1/1023. She should follow up with Ortho in the outpatient setting.

## 2023-07-24 NOTE — CARE UPDATE
07/24/23 0709   Patient Assessment/Suction   Level of Consciousness (AVPU) alert   Respiratory Effort Normal;Unlabored   Expansion/Accessory Muscles/Retractions no use of accessory muscles;no retractions;expansion symmetric   All Lung Fields Breath Sounds diminished   Rhythm/Pattern, Respiratory assisted mechanically   Cough Frequency with stimulation   Cough Type assisted   Suction Method tracheal;oral   Suction Pressure (mmHg) 120 mmHg   $ Suction Charges Inline Suction Procedure Stat Charge   Secretions Amount small   Secretions Color white   Secretions Characteristics thick   Skin Integrity   $ Wound Care Tech Time 15 min   Area Observed Neck under tracheostomy   Skin Appearance without discoloration   Barrier used?   (drain sponge)   PRE-TX-O2   Device (Oxygen Therapy) ventilator   $ Is the patient on Low Flow Oxygen? Yes   Oxygen Concentration (%) 70   SpO2 (!) 94 %   Pulse 77   Resp (!) 30   Aerosol Therapy   $ Aerosol Therapy Charges Aerosol Treatment   Daily Review of Necessity (SVN) completed   Respiratory Treatment Status (SVN) given   Treatment Route (SVN) in-line   Patient Position (SVN) HOB elevated   Post Treatment Assessment (SVN) increased aeration   Signs of Intolerance (SVN) none   Breath Sounds Post-Respiratory Treatment   Throughout All Fields Post-Treatment All Fields   Throughout All Fields Post-Treatment aeration increased   Post-treatment Heart Rate (beats/min) 80   Post-treatment Resp Rate (breaths/min) 30   Adult Surgical Airway Shiley Cuffed 6.0/ 75mm   No placement date or time found.   Present Prior to Hospital Arrival?: Yes  Type: Tracheostomy  Brand: Shiley  Airway Device Style: Cuffed  Airway Device Size: 6.0/ 75mm   Cuff Pressure   (mlt)   Site Assessment Dry;Clean   Site Care Dried;Cleansed   Ties Assessment Dry;Clean   Airway Safety   Is Ambu Bag and Mask with Patient? Yes, Adult Ambu Bag and Mask   Vent Select   Charged w/in last 24h NO   Preset Conventional Ventilator  Settings   Vent ID [id number]   Vent Type    Ventilation Type VC   Vent Mode A/C   Humidity HME   Set Rate 16 BPM   Vt Set 360 mL   PEEP/CPAP 5 cmH20   Pressure Support 0 cmH20   Waveform RAMP   Peak Flow 50 L/min   Plateau Set/Insp. Hold (sec) 0   Trigger Sensitivity Flow/I-Trigger 2.7 L/min   Patient Ventilator Parameters   Resp Rate Total 31 br/min   Peak Airway Pressure 35 cmH20   Mean Airway Pressure 14 cmH20   Plateau Pressure 0 cmH20   Exhaled Vt 378 mL   Total Ve 8.61 L/m   I:E Ratio Measured 1:1.80   Conventional Ventilator Alarms   Alarms On Y   Resp Rate High Alarm 50 br/min   Press High Alarm 55 cmH2O   Apnea Rate 10   Apnea Volume (mL) 0 mL   Apnea Oxygen Concentration  100   Apnea Flow Rate (L/min) 50   T Apnea 25 sec(s)   Ready to Wean/Extubation Screen   FIO2<=50 (chart decimal) (!) 0.7   MV<16L (chart vol.) 8.61   PEEP <=8 (chart #) 5   Ready to Wean Parameters   F/VT Ratio<105 (RSBI) (!) 79.37   Respiratory Evaluation   $ Care Plan Tech Time 15 min

## 2023-07-25 LAB
ALBUMIN SERPL BCP-MCNC: 3.2 G/DL (ref 3.5–5.2)
ALP SERPL-CCNC: 148 U/L (ref 55–135)
ALT SERPL W/O P-5'-P-CCNC: 47 U/L (ref 10–44)
ANION GAP SERPL CALC-SCNC: 6 MMOL/L (ref 8–16)
AST SERPL-CCNC: 47 U/L (ref 10–40)
BASOPHILS # BLD AUTO: 0.03 K/UL (ref 0–0.2)
BASOPHILS NFR BLD: 0.3 % (ref 0–1.9)
BILIRUB SERPL-MCNC: 1.5 MG/DL (ref 0.1–1)
BUN SERPL-MCNC: 21 MG/DL (ref 6–20)
CALCIUM SERPL-MCNC: 8.6 MG/DL (ref 8.7–10.5)
CHLORIDE SERPL-SCNC: 113 MMOL/L (ref 95–110)
CO2 SERPL-SCNC: 24 MMOL/L (ref 23–29)
CREAT SERPL-MCNC: 0.4 MG/DL (ref 0.5–1.4)
DIFFERENTIAL METHOD: ABNORMAL
EOSINOPHIL # BLD AUTO: 0.2 K/UL (ref 0–0.5)
EOSINOPHIL NFR BLD: 1.7 % (ref 0–8)
ERYTHROCYTE [DISTWIDTH] IN BLOOD BY AUTOMATED COUNT: 22 % (ref 11.5–14.5)
EST. GFR  (NO RACE VARIABLE): >60 ML/MIN/1.73 M^2
GLUCOSE SERPL-MCNC: 80 MG/DL (ref 70–110)
HCT VFR BLD AUTO: 26.3 % (ref 37–48.5)
HGB BLD-MCNC: 7.8 G/DL (ref 12–16)
IMM GRANULOCYTES # BLD AUTO: 0.02 K/UL (ref 0–0.04)
IMM GRANULOCYTES NFR BLD AUTO: 0.2 % (ref 0–0.5)
LYMPHOCYTES # BLD AUTO: 1.5 K/UL (ref 1–4.8)
LYMPHOCYTES NFR BLD: 17.7 % (ref 18–48)
MAGNESIUM SERPL-MCNC: 1.8 MG/DL (ref 1.6–2.6)
MCH RBC QN AUTO: 27 PG (ref 27–31)
MCHC RBC AUTO-ENTMCNC: 29.7 G/DL (ref 32–36)
MCV RBC AUTO: 91 FL (ref 82–98)
MONOCYTES # BLD AUTO: 0.8 K/UL (ref 0.3–1)
MONOCYTES NFR BLD: 9.6 % (ref 4–15)
NEUTROPHILS # BLD AUTO: 6.1 K/UL (ref 1.8–7.7)
NEUTROPHILS NFR BLD: 70.5 % (ref 38–73)
NRBC BLD-RTO: 0 /100 WBC
PHOSPHATE SERPL-MCNC: 2.6 MG/DL (ref 2.7–4.5)
PLATELET # BLD AUTO: 193 K/UL (ref 150–450)
PMV BLD AUTO: 11.7 FL (ref 9.2–12.9)
POTASSIUM SERPL-SCNC: 3.6 MMOL/L (ref 3.5–5.1)
PROT SERPL-MCNC: 8.6 G/DL (ref 6–8.4)
RBC # BLD AUTO: 2.89 M/UL (ref 4–5.4)
SODIUM SERPL-SCNC: 143 MMOL/L (ref 136–145)
VANCOMYCIN TROUGH SERPL-MCNC: 26.4 UG/ML
WBC # BLD AUTO: 8.71 K/UL (ref 3.9–12.7)

## 2023-07-25 PROCEDURE — 84100 ASSAY OF PHOSPHORUS: CPT | Performed by: FAMILY MEDICINE

## 2023-07-25 PROCEDURE — 85025 COMPLETE CBC W/AUTO DIFF WBC: CPT | Performed by: FAMILY MEDICINE

## 2023-07-25 PROCEDURE — 99233 SBSQ HOSP IP/OBS HIGH 50: CPT | Mod: ,,, | Performed by: STUDENT IN AN ORGANIZED HEALTH CARE EDUCATION/TRAINING PROGRAM

## 2023-07-25 PROCEDURE — 99900031 HC PATIENT EDUCATION (STAT)

## 2023-07-25 PROCEDURE — 99900035 HC TECH TIME PER 15 MIN (STAT)

## 2023-07-25 PROCEDURE — 25000003 PHARM REV CODE 250: Performed by: INTERNAL MEDICINE

## 2023-07-25 PROCEDURE — 99900026 HC AIRWAY MAINTENANCE (STAT)

## 2023-07-25 PROCEDURE — 25000003 PHARM REV CODE 250: Performed by: FAMILY MEDICINE

## 2023-07-25 PROCEDURE — 94640 AIRWAY INHALATION TREATMENT: CPT

## 2023-07-25 PROCEDURE — 27000221 HC OXYGEN, UP TO 24 HOURS

## 2023-07-25 PROCEDURE — 94003 VENT MGMT INPAT SUBQ DAY: CPT

## 2023-07-25 PROCEDURE — 25000242 PHARM REV CODE 250 ALT 637 W/ HCPCS: Performed by: INTERNAL MEDICINE

## 2023-07-25 PROCEDURE — 80053 COMPREHEN METABOLIC PANEL: CPT | Performed by: FAMILY MEDICINE

## 2023-07-25 PROCEDURE — 94761 N-INVAS EAR/PLS OXIMETRY MLT: CPT

## 2023-07-25 PROCEDURE — 99291 PR CRITICAL CARE, E/M 30-74 MINUTES: ICD-10-PCS | Mod: ,,, | Performed by: INTERNAL MEDICINE

## 2023-07-25 PROCEDURE — 99233 PR SUBSEQUENT HOSPITAL CARE,LEVL III: ICD-10-PCS | Mod: ,,, | Performed by: STUDENT IN AN ORGANIZED HEALTH CARE EDUCATION/TRAINING PROGRAM

## 2023-07-25 PROCEDURE — 63600175 PHARM REV CODE 636 W HCPCS: Performed by: INTERNAL MEDICINE

## 2023-07-25 PROCEDURE — 99291 CRITICAL CARE FIRST HOUR: CPT | Mod: ,,, | Performed by: INTERNAL MEDICINE

## 2023-07-25 PROCEDURE — 83735 ASSAY OF MAGNESIUM: CPT | Performed by: FAMILY MEDICINE

## 2023-07-25 PROCEDURE — 36415 COLL VENOUS BLD VENIPUNCTURE: CPT | Performed by: FAMILY MEDICINE

## 2023-07-25 PROCEDURE — 94760 N-INVAS EAR/PLS OXIMETRY 1: CPT

## 2023-07-25 PROCEDURE — 63600175 PHARM REV CODE 636 W HCPCS: Performed by: FAMILY MEDICINE

## 2023-07-25 PROCEDURE — 20000000 HC ICU ROOM

## 2023-07-25 RX ADMIN — MIDODRINE HYDROCHLORIDE 10 MG: 2.5 TABLET ORAL at 10:07

## 2023-07-25 RX ADMIN — CARBOXYMETHYLCELLULOSE SODIUM 1 DROP: 5 SOLUTION/ DROPS OPHTHALMIC at 09:07

## 2023-07-25 RX ADMIN — ACETYLCYSTEINE 2 ML: 200 SOLUTION ORAL; RESPIRATORY (INHALATION) at 01:07

## 2023-07-25 RX ADMIN — ENOXAPARIN SODIUM 40 MG: 100 INJECTION SUBCUTANEOUS at 05:07

## 2023-07-25 RX ADMIN — MEROPENEM 1 G: 1 INJECTION, POWDER, FOR SOLUTION INTRAVENOUS at 10:07

## 2023-07-25 RX ADMIN — MICONAZOLE NITRATE: 20 CREAM TOPICAL at 10:07

## 2023-07-25 RX ADMIN — CARBOXYMETHYLCELLULOSE SODIUM 1 DROP: 5 SOLUTION/ DROPS OPHTHALMIC at 05:07

## 2023-07-25 RX ADMIN — MEROPENEM 1 G: 1 INJECTION, POWDER, FOR SOLUTION INTRAVENOUS at 05:07

## 2023-07-25 RX ADMIN — MEROPENEM 1 G: 1 INJECTION, POWDER, FOR SOLUTION INTRAVENOUS at 12:07

## 2023-07-25 RX ADMIN — CARBOXYMETHYLCELLULOSE SODIUM 1 DROP: 5 SOLUTION/ DROPS OPHTHALMIC at 10:07

## 2023-07-25 RX ADMIN — IPRATROPIUM BROMIDE AND ALBUTEROL SULFATE 3 ML: 2.5; .5 SOLUTION RESPIRATORY (INHALATION) at 08:07

## 2023-07-25 RX ADMIN — MUPIROCIN: 20 OINTMENT TOPICAL at 09:07

## 2023-07-25 RX ADMIN — IPRATROPIUM BROMIDE AND ALBUTEROL SULFATE 3 ML: 2.5; .5 SOLUTION RESPIRATORY (INHALATION) at 01:07

## 2023-07-25 RX ADMIN — MIDODRINE HYDROCHLORIDE 10 MG: 2.5 TABLET ORAL at 07:07

## 2023-07-25 RX ADMIN — ACETYLCYSTEINE 2 ML: 200 SOLUTION ORAL; RESPIRATORY (INHALATION) at 08:07

## 2023-07-25 RX ADMIN — Medication 4000 UNITS: at 10:07

## 2023-07-25 RX ADMIN — MIDODRINE HYDROCHLORIDE 10 MG: 2.5 TABLET ORAL at 04:07

## 2023-07-25 RX ADMIN — ACETYLCYSTEINE 2 ML: 200 SOLUTION ORAL; RESPIRATORY (INHALATION) at 07:07

## 2023-07-25 RX ADMIN — MICONAZOLE NITRATE: 20 CREAM TOPICAL at 09:07

## 2023-07-25 RX ADMIN — IPRATROPIUM BROMIDE AND ALBUTEROL SULFATE 3 ML: 2.5; .5 SOLUTION RESPIRATORY (INHALATION) at 07:07

## 2023-07-25 RX ADMIN — MINERAL SUPPLEMENT IRON 300 MG / 5 ML STRENGTH LIQUID 100 PER BOX UNFLAVORED 300 MG: at 10:07

## 2023-07-25 RX ADMIN — CARBOXYMETHYLCELLULOSE SODIUM 1 DROP: 5 SOLUTION/ DROPS OPHTHALMIC at 12:07

## 2023-07-25 RX ADMIN — MUPIROCIN 1 G: 20 OINTMENT TOPICAL at 09:07

## 2023-07-25 NOTE — CARE UPDATE
07/25/23 0707   Patient Assessment/Suction   Level of Consciousness (AVPU) responds to pain   Respiratory Effort Unlabored   Expansion/Accessory Muscles/Retractions no retractions;no use of accessory muscles   All Lung Fields Breath Sounds coarse   Rhythm/Pattern, Respiratory assisted mechanically   Cough Frequency with stimulation   Cough Type assisted   Suction Method tracheal;oral   Suction Pressure (mmHg) -120 mmHg   $ Suction Charges Inline Suction Procedure Stat Charge   Secretions Amount moderate   Secretions Color yellow;pale   Secretions Characteristics thick   Sputum Collection sample obtained per suctioning   PRE-TX-O2   Device (Oxygen Therapy) ventilator   $ Is the patient on Low Flow Oxygen? Yes   Oxygen Concentration (%) 70   SpO2 97 %   Pulse Oximetry Type Continuous   $ Pulse Oximetry - Single Charge Pulse Oximetry - Single   Pulse 95   Resp (!) 25   Aerosol Therapy   $ Aerosol Therapy Charges Aerosol Treatment  (mucomyst)   Daily Review of Necessity (SVN) completed   Respiratory Treatment Status (SVN) given   Treatment Route (SVN) in-line   Patient Position (SVN) HOB elevated   Post Treatment Assessment (SVN) breath sounds unchanged   Signs of Intolerance (SVN) none   Adult Surgical Airway Shiley Cuffed 6.0/ 75mm   No placement date or time found.   Present Prior to Hospital Arrival?: Yes  Type: Tracheostomy  Brand: Shiley  Airway Device Style: Cuffed  Airway Device Size: 6.0/ 75mm   Cuff Pressure   (MLT)   Cuff Inflation? Inflated   Site Assessment Clean;Dry   Site Care Protective barrier to skin   Ties Assessment Clean   Airway Safety   Is Ambu Bag and Mask with Patient? Yes, Adult Ambu Bag and Mask   Trach Supplies at Bedside Inner Cannula;Suction Catheter;Trach Ties;10cc Syringes   Suction set is at the bedside? Yes   Extra trach at bedside? Yes   Extra trach sizes at bedside? 4;6   Is Obturator Available? Yes   Location of Obturator?  Bedside table   Vent Select   Conventional Vent Y    Charged w/in last 24h YES   Preset Conventional Ventilator Settings   Vent ID [id number]   Vent Type    Ventilation Type VC   Vent Mode A/C   Humidity HME   Set Rate 16 BPM   Vt Set 360 mL   PEEP/CPAP 5 cmH20   Pressure Support 0 cmH20   Waveform RAMP   Peak Flow 50 L/min   Plateau Set/Insp. Hold (sec) 0   Trigger Sensitivity Flow/I-Trigger 2.7 L/min   Patient Ventilator Parameters   Resp Rate Total 32 br/min   Peak Airway Pressure 34 cmH20   Mean Airway Pressure 16 cmH20   Plateau Pressure 0 cmH20   Exhaled Vt 371 mL   Total Ve 11.7 L/m   I:E Ratio Measured 1:1.40   Conventional Ventilator Alarms   Alarms On Y   Ve High Alarm 18 L/min   Ve Low Alarm 4 L/min   Vt High Alarm 860 mL   Vt Low Alarm 200 mL   Resp Rate High Alarm 50 br/min   Press High Alarm 60 cmH2O   Apnea Rate 10   Apnea Volume (mL) 0 mL   Apnea Oxygen Concentration  100   Apnea Flow Rate (L/min) 50   T Apnea 25 sec(s)   IHI Ventilator Associated Pneumonia Bundle (Required)   Daily Awakening Trials Performed Not applicable   Daily Assessment of Readiness to Extubate No (Comment)   Head of Bed Elevated  HOB 30   Oral Care Mouth suctioned   Vent Circut Breaks Minimized Yes   Ready to Wean/Extubation Screen   FIO2<=50 (chart decimal) (!) 0.7   MV<16L (chart vol.) 11.7   PEEP <=8 (chart #) 5   Ready to Wean Parameters   F/VT Ratio<105 (RSBI) (!) 67.39   Education   $ Education 15 min

## 2023-07-25 NOTE — CONSULTS
Chief complaint:  Shortness of Breath (Arrived from [place] via [employer] with c/c SOB that started earlier today)      HPI:  Genna Felix is a 62 y.o. female presenting with BL buttock stage 2 pressure ulcers and BL heels stage 2 pressure ulcers. Pt is a resident at [facility] and is in a vegetative state and vent dependent with a trach. Pt was admitted for decreased oxygenation and on admission the wounds were found POA.      PMH:  As per HPI and below:  Past Medical History:   Diagnosis Date    Anoxic brain damage 07/2020    Bedbound 07/2020    Cardiac arrest 07/2020    Cirrhosis     GERD (gastroesophageal reflux disease)     Hemangioma of intra-abdominal structure     Hypertension     Nursing home resident     Protein calorie malnutrition     Pulmonary embolus     Respiratory distress     S/P percutaneous endoscopic gastrostomy (PEG) tube placement 07/2020    Total self-care deficit 07/2020    Tracheostomy dependence     7/2020       Social History     Socioeconomic History    Marital status:    Tobacco Use    Smoking status: Never    Smokeless tobacco: Never   Substance and Sexual Activity    Alcohol use: Not Currently    Drug use: Not Currently    Sexual activity: Not Currently       Past Surgical History:   Procedure Laterality Date    PEG W/TRACHEOSTOMY PLACEMENT  07/27/2020    SKIN GRAFT      buttocks       Family History   Problem Relation Age of Onset    Hypertension Mother     No Known Problems Father        Review of patient's allergies indicates:  No Known Allergies    No current facility-administered medications on file prior to encounter.     Current Outpatient Medications on File Prior to Encounter   Medication Sig Dispense Refill    acetaminophen (TYLENOL) 325 MG tablet 2 tablets (650 mg total) by Per G Tube route every 4 (four) hours as needed for Pain or Temperature greater than (100.4F).  0    acetylcysteine 100 mg/ml, 10%, (MUCOMYST) 100 mg/mL (10 %) nebulizer solution Take 4  mLs by nebulization every 8 (eight) hours.  12    albuterol-ipratropium (DUO-NEB) 2.5 mg-0.5 mg/3 mL nebulizer solution Take 3 mLs by nebulization every 6 (six) hours. Rescue 1 Box 0    arginine/glutamine/calcium bmb (XIOMARA ORAL) 1 Package by PEG Tube route 2 (two) times a day. In 8oz of water      cefTRIAXone (ROCEPHIN) 1 gram injection Inject 1 g into the muscle daily as needed.      cycloSPORINE (RESTASIS) 0.05 % ophthalmic emulsion 1 drop 2 (two) times daily. 0900  2100      dextran 70-hypromellose (ARTIFICIAL TEARS,[id number],) ophthalmic solution Place 1 drop into both eyes 4 (four) times daily as needed.      ergocalciferol (ERGOCALCIFEROL) 50,000 unit Cap Take 50,000 Units by mouth every Saturday.      ferrous sulfate 300 mg (60 mg iron)/5 mL syrup Take 300 mg by mouth As instructed. Every other day      lactose-reduced food (ISOSOURCE ORAL) 1.5 mg by Per G Tube route continuous. 1.5 per peg tube at 50ml/hr continuously      linaCLOtide (LINZESS) 145 mcg Cap capsule Take 145 mcg by mouth before breakfast.      miconazole (MICOTIN) 2 % cream Apply topically 2 (two) times daily. Reason:  Tinea pedis for 14 days  0    midodrine (PROAMATINE) 10 MG tablet 1 tablet (10 mg total) by Per G Tube route 3 (three) times daily. (Patient taking differently: 10 mg by Per G Tube route 3 (three) times daily. 0600  1400  2200) 90 tablet 11    ondansetron (ZOFRAN) 4 MG tablet 4 mg by Per G Tube route every 8 (eight) hours as needed for Nausea.      polyethylene glycol (GLYCOLAX) 17 gram PwPk Take 17 g by mouth daily as needed.      polyvinyl alcohol-povidone (CLEAR EYES NATURAL TEARS) 0.5-0.6 % Drop Apply 1 drop to eye daily as needed.      [DISCONTINUED] clonazePAM (KLONOPIN) 0.5 MG tablet Take 0.5 mg by mouth 2 (two) times daily.      [DISCONTINUED] oxybutynin (DITROPAN) 5 MG Tab Take 5 mg by mouth 2 (two) times daily.         ROS: As per HPI and below:  Review of systems not obtained due to patient factors.      Physical  Exam:     Vitals:    23 1400 23 1501 23 1545 23 1701   BP: (!) 97/56 (!) 91/53  (!) 98/59   Pulse: 96 94 89 86   Resp: (!) 37 (!) 35 (!) 34 (!) 32   Temp:       TempSrc:       SpO2: (!) 93% (!) 93% (!) 93% (!) 94%   Weight:       Height:           BP  Min: 78/47  Max: 188/105  Temp  Av.1 °F (35.1 °C)  Min: 91.6 °F (33.1 °C)  Max: 97.4 °F (36.3 °C)  Pulse  Av.9  Min: 32  Max: 119  Resp  Av.5  Min: 2  Max: 48  SpO2  Av.1 %  Min: 88 %  Max: 98 %  Height  Av' (152.4 cm)  Min: 5' (152.4 cm)  Max: 5' (152.4 cm)  Weight  Av.8 kg (195 lb 13.7 oz)  Min: 88.7 kg (195 lb 8 oz)  Max: 89 kg (196 lb 3.4 oz)    Body mass index is 38.32 kg/m².          General:             Well developed, well nourished, no apparent distress  HEENT:              NCAT, no JVD, mucous membranes moist, EOM intact  Cardiovascular:  Regular rate and rhythm, normal S1, normal S2, No murmurs, rubs, or gallops  Respiratory:        Normal breath sounds, no wheezes, no rales, no rhonchi  Abdomen:           Bowel sounds present, non tender, non distended, no masses, no hepatojugular reflux  Extremities:        No clubbing, no cyanosis, no edema  Vascular:            2+ b/l radial.  Peripheral pulses intact.  No carotid bruits.  Neurological:      No focal deficits  Skin:                   No obvious rashes or erythema, BL buttocks stage 2 and BL heels stage 2 pressure ulcers      Lab Results   Component Value Date    WBC 4.56 2023    HGB 7.6 (L) 2023    HCT 26.0 (L) 2023    MCV 91 2023     2023     No results found for: CHOL  No results found for: HDL  No results found for: LDLCALC  No results found for: TRIG  No results found for: CHOLHDL  CMP  Recent Labs   Lab 23  0335   GLU 95   CALCIUM 9.1   ALBUMIN 3.2*   PROT 8.6*      K 4.1   CO2 27   *   BUN 24*   CREATININE 0.4*   ALKPHOS 141*   ALT 29   AST 23   BILITOT 1.2*      Lab Results   Component Value  Date    TSH 1.880 07/23/2023             Assessment and Recommendations       Diagnoses:    1. BL buttocks stage 2 pressure ulcers  2. BL heels stage 2 pressure ulcers    Plan:  1. Foam dressings to the BL heels and add foam boots  2. Triad to the BL buttocks    Complexity:    low

## 2023-07-25 NOTE — PROGRESS NOTES
Progress Note  Infectious Disease    Reason for Consult:  Hypothermia, sepsis    HPI: Genna Felix is a 62 y.o. female resident of [facility], known to ID service from prior visits, w PMHx of MAICO 2020, vegetative state, vent dependent, tracheostomy, PEG, PE, HTN, GERD, anemia, osteopenia, acute cholecystitis status post IR drainage in January '22, right arm infection in August '22, MDRs infections like Pseudomonas , Acinetobacter , MRSA, Proteus mirabilis ESBL etc.    She has multiple hospitalizations.   Most recent hospitalization was in the beginning of July.  She had x2 Proteus mirabilis ESBL in urine, Serratia marcescens in sputum, and blood cultures had coag-negative staph on 07/06/2023.  Patient completed treatment with meropenem.    This time, patient is sent to ED because of decreased oxygenation, pulse ox around 87%.  CTA ruled out PE, but found interstitial opacities septal thickening and consolidation of right lower lobe.    She is hypothermic, which is her usual sign of sepsis/infection, needing a Beth Hugger.  She is admitted in ICU, intubated through tracheostomy, FiO2 of 70%, receiving vancomycin and meropenem.  Secretions are pale and yellow.  Sputum cultures are not sent yet, I just did.  Procalcitonin normal at 0.13.  ,  which is slightly higher than usual, blood cultures NGTD    7/24 ([place]): Interim reviewed, discussed with Dr Mccormack, patient seen and examined at bedside, remains trach to vent, FiO2 70%, white thick secretions. Micro reviewed, MRSA nasal negative, blood cultures x 2 no growth to date pending final. Respiratory culture to be collected. UA yesterday with pyuria, pending culture.     7/25: Interim reviewed, patient seen and examined at bedside, remain on FiO2 70%. Hemodynamically stable, afebrile, Labs reviewed,  WBC stable, no left shift, H/H 7.8/26.3, plt: 193. Creatinine 0.4, mild transaminits. Vanco levels supratherapeutic, discontinued. Micro reviewed,  respiratory culture with presumptive Pseudomonas, pending final.       Antibiotics (From admission, onward)      Start     Stop Route Frequency Ordered    07/23/23 0900  mupirocin 2 % ointment         07/28 0859 Nasl 2 times daily 07/23/23 0032    07/23/23 0500  meropenem 1 g in sodium chloride 0.9 % 100 mL IVPB (ready to mix system)         -- IV Every 8 hours (non-standard times) 07/23/23 0227          Antifungals (From admission, onward)      Start     Stop Route Frequency Ordered    07/23/23 0900  miconazole 2 % cream         -- Top 2 times daily 07/23/23 0227            Review of patient's allergies indicates:  No Known Allergies  Past Medical History:   Diagnosis Date    Anoxic brain damage 07/2020    Bedbound 07/2020    Cardiac arrest 07/2020    Cirrhosis     GERD (gastroesophageal reflux disease)     Hemangioma of intra-abdominal structure     Hypertension     Nursing home resident     Protein calorie malnutrition     Pulmonary embolus     Respiratory distress     S/P percutaneous endoscopic gastrostomy (PEG) tube placement 07/2020    Total self-care deficit 07/2020    Tracheostomy dependence     7/2020     Past Surgical History:   Procedure Laterality Date    PEG W/TRACHEOSTOMY PLACEMENT  07/27/2020    SKIN GRAFT      buttocks     Social History     Socioeconomic History    Marital status:    Tobacco Use    Smoking status: Never    Smokeless tobacco: Never   Substance and Sexual Activity    Alcohol use: Not Currently    Drug use: Not Currently    Sexual activity: Not Currently     Family History   Problem Relation Age of Onset    Hypertension Mother     No Known Problems Father          EXAM & DIAGNOSTICS REVIEWED:   Vitals:        Temp: (!) 95.2 °F (35.1 °C) (07/24/23 0315)  Pulse: 95 (07/25/23 0707)  Resp: (!) 25 (07/25/23 0707)  BP: (!) 98/59 (07/24/23 1701)  SpO2: 97 % (07/25/23 0707)    Intake/Output Summary (Last 24 hours) at 7/25/2023 0859  Last data filed at 7/25/2023 0655  Gross per 24 hour    Intake 1719.8 ml   Output 1250 ml   Net 469.8 ml     Vent Mode: A/C  Oxygen Concentration (%):  [70] 70  Resp Rate Total:  [24 br/min-41 br/min] 33 br/min  Vt Set:  [360 mL] 360 mL  PEEP/CPAP:  [5 cmH20] 5 cmH20  Pressure Support:  [0 cmH20] 0 cmH20  Mean Airway Pressure:  [13 uyY23-71 cmH20] 16 cmH20     General:  Frail elderly lady, laying in bed, ishan Hugger on  Eyes:  Anicteric, L eye with hyperemia   ENT:  Dry lips, thick oral secretions   Neck:  Supple  Tracheostomy to vent.  Lungs: R base rhonchi  Heart:  S1/S2+, regular rhythm   Abd:  Slightly distended, hypoactive bowel sounds, no pain, PEG in place  :  Purewick, no urinary outpt, about to check bladder scan    Musc:  Joints without effusion, swelling, erythema, synovitis; positive for muscle wasting.   Skin:  See pictures.  Positive for dry skin throughout.  No palmar or plantar lesions. No subungual petechiae  Neuro:        Gaze  towards the right upper lung field  Psych    Lymphatic:       Extrem: No edema, erythema, phlebitis, cellulitis, warm and well perfused  VAD:  Peripheral IV     Midline L 7/7     Isolation:  Contact isolation/[place] resident  Wound:         Lines/Tubes/Drains:    General Labs reviewed:  Recent Labs   Lab 07/23/23 0315 07/24/23  0335 07/25/23  0324   WBC 8.36 4.56 8.71   HGB 8.7* 7.6* 7.8*   HCT 29.9* 26.0* 26.3*    186 193       Recent Labs   Lab 07/23/23  0315 07/24/23  0335 07/25/23  0324    144 143   K 5.0 4.1 3.6    112* 113*   CO2 27 27 24   BUN 29* 24* 21*   CREATININE 0.3* 0.4* 0.4*   CALCIUM 9.4 9.1 8.6*   PROT 9.4* 8.6* 8.6*   BILITOT 1.3* 1.2* 1.5*   ALKPHOS 157* 141* 148*   ALT 35 29 47*   AST 31 23 47*     No results for input(s): CRP in the last 168 hours.    Estimated Creatinine Clearance: 148.5 mL/min (A) (based on SCr of 0.4 mg/dL (L)).      Micro:  Microbiology Results (last 7 days)       Procedure Component Value Units Date/Time    Culture, Respiratory with Gram Stain [[id number]]   (Abnormal) Collected: 07/24/23 1148    Order Status: Completed Specimen: Respiratory from Sputum Updated: 07/25/23 0739     Respiratory Culture PRESUMPTIVE PSEUDOMONAS SPECIES  Many  Identification and susceptibility pending       Gram Stain (Respiratory) <10 epithelial cells per low power field.     Gram Stain (Respiratory) Many WBC's     Gram Stain (Respiratory) Moderate Gram negative rods    Urine culture [[id number]] Collected: 07/23/23 1602    Order Status: Completed Specimen: Urine Updated: 07/25/23 0651     Urine Culture, Routine No growth to date    Narrative:      Specimen Source->Urine    Blood culture x two cultures. Draw prior to antibiotics. [[id number]] Collected: 07/22/23 2044    Order Status: Completed Specimen: Blood Updated: 07/24/23 2232     Blood Culture, Routine No Growth to date      No Growth to date      No Growth to date    Narrative:      Aerobic and anaerobic  Collection has been rescheduled by 2MB at 07/22/2023 19:43 Reason: Amairani liz said she will call when she wants cultures drawn  Collection has been rescheduled by 2MB at 07/22/2023 19:43 Reason: Amairani liz said she will call when she wants cultures drawn    Blood culture x two cultures. Draw prior to antibiotics. [[id number]] Collected: 07/22/23 2050    Order Status: Completed Specimen: Blood Updated: 07/24/23 2232     Blood Culture, Routine No Growth to date      No Growth to date      No Growth to date    Narrative:      Aerobic and anaerobic  Collection has been rescheduled by 2MB at 07/22/2023 19:43 Reason: Amairani liz said she will call when she wants cultures drawn  Collection has been rescheduled by 2MB at 07/22/2023 19:43 Reason: Amairani liz said she will call when she wants cultures drawn    MRSA Screen by PCR [[id number]] Collected: 07/23/23 1610    Order Status: Completed Specimen: Nasopharyngeal Swab from Nasal Updated: 07/23/23 1750     MRSA SCREEN BY PCR Negative            Imaging Reviewed:  Chest x-ray  07/22/2023.      Slight increasing infiltrates in the lungs.    CT 07/22/2023.     1.  No acute aortic abnormality or pulmonary embolism.  2.  Bilateral interstitial opacities with interlobular septal thickening and consolidation in the right lower lobe.  Imaging features can be seen with COVID pneumonia, though are nonspecific and can occur with a variety of infectious and noninfectious processes. PneInd.  3.  Mediastinal and bihilar lymphadenopathy.  4.  Small bilateral pleural effusions.  5.  No acute abdominal or pelvic abnormality.  6.  Splenomegaly.  7.  Hepatic steatosis.      Cardiology:    IMPRESSION & PLAN     Sepsis + hypothermia resolved, secondary to VAP  Respiratory culture Pseudomonas, pending final    MRSA nasal swab not detected   Urine culture no growth to date, pending final   Blood cultures x 2 no growth to date pending final     2. Resident of [facility], known to ID service from prior visits, w PMHx of MAICO 2020, vegetative state, vent dependent, tracheostomy, PEG, PE, HTN, GERD, anemia, osteopenia, acute cholecystitis status post IR drainage in January '22, right arm infection in August '22, MDRs infections like Pseudomonas , Acinetobacter , MRSA, Proteus mirabilis ESBL etc.Splenomegaly, hepato steatosis,      Recommendations:  Stop vancomycin IV    Meropenem 1g IV q8h for now will adjust based on sensitivities   CXR ordered   Follow cultures   Trend LFTs  Aspiration precautions     D/w ICU nursing, Dr Ritchie, Dr Siddiqi       Medical Decision Making during this encounter was  [_] Low Complexity  [_] Moderate Complexity  [ xxx ] High Complexity

## 2023-07-25 NOTE — PROGRESS NOTES
[facility]  Progress Note    Patient Name: Genna Felix  MRN: [id number]  Patient Class: IP- Inpatient   Admission Date: 7/22/2023  Length of Stay: 3 days  Attending Physician: Geeta Siddiqi MD  Primary Care Provider: Primary Doctor No        Subjective:     Principal Problem:Pneumonia of both lungs due to infectious organism        HPI:  Patient presents to ER due to respiratory distress.     Spoke with sister, Amelia. States that she was told her oxygen was dropping low at Ms. Felix's nursing home, [place]. Was transferred to the ER. Called [facility] for more info and spoke with Ms. Yousif (a nurse who got sign out from the day team about patient). Was told that Respiratory was trying to improve her sats throughout the day however it never improved above 87%. Patient ws then transferred to ER. Staff noticed urine output was dirty with sediment so u/a and urine culture ordered.      Overview/Hospital Course:  Patient admitted to intensive care unit where patient maintain on mechanical ventilation.  Patient required use of a Beth Hugger for hypothermia.  Patient was placed on broad-spectrum antibiotic therapy.  Patient also noted to have evidence of urinary tract infection.  Infectious disease specialist and critical care attending closely followed the patient.      Interval History:  On ventilator, no acute distress, normothermic Sputum Cx growing Pseudomonas. Off IV Vancomycin. Receiving intravenous Merrem antibiotic therapy.    Review of Systems   Unable to perform ROS: Patient nonverbal   Objective:     Vital Signs (Most Recent):  Temp: (!) 95.2 °F (35.1 °C) (07/24/23 0315)  Pulse: 95 (07/25/23 0707)  Resp: (!) 25 (07/25/23 0707)  BP: (!) 98/59 (07/24/23 1701)  SpO2: 97 % (07/25/23 0707) Vital Signs (24h Range):  Pulse:  [] 95  Resp:  [22-39] 25  SpO2:  [89 %-97 %] 97 %  BP: (88-98)/(51-59) 98/59     Weight: 89 kg (196 lb 3.4 oz)  Body mass index is 38.32  kg/m².    Intake/Output Summary (Last 24 hours) at 7/25/2023 0900  Last data filed at 7/25/2023 0655  Gross per 24 hour   Intake 1719.8 ml   Output 1250 ml   Net 469.8 ml           Physical Exam  Constitutional:       General: She is not in acute distress.     Appearance: She is not ill-appearing.   HENT:      Head: Normocephalic and atraumatic.   Cardiovascular:      Rate and Rhythm: Normal rate and regular rhythm.   Pulmonary:      Breath sounds: Wheezing present.      Comments: Vent in place  Abdominal:      General: There is distension.   Neurological:      Mental Status: She is alert.   Psychiatric:      Comments: Patient non-verbal           Significant Labs: All pertinent labs within the past 24 hours have been reviewed.  CBC:   Recent Labs   Lab 07/24/23  0335 07/25/23  0324   WBC 4.56 8.71   HGB 7.6* 7.8*   HCT 26.0* 26.3*    193       CMP:   Recent Labs   Lab 07/24/23  0335 07/25/23  0324    143   K 4.1 3.6   * 113*   CO2 27 24   GLU 95 80   BUN 24* 21*   CREATININE 0.4* 0.4*   CALCIUM 9.1 8.6*   PROT 8.6* 8.6*   ALBUMIN 3.2* 3.2*   BILITOT 1.2* 1.5*   ALKPHOS 141* 148*   AST 23 47*   ALT 29 47*   ANIONGAP 5* 6*       Coagulation: No results for input(s): PT, INR, APTT in the last 48 hours.  Lactic Acid:   No results for input(s): LACTATE in the last 48 hours.    Troponin:   No results for input(s): TROPONINI, TROPONINIHS in the last 48 hours.    TSH:   Recent Labs   Lab 07/23/23  0315   TSH 1.880       Urine Studies:   Recent Labs   Lab 07/23/23  1602   COLORU Yellow   APPEARANCEUA Hazy*   PHUR 7.0   SPECGRAV 1.010   PROTEINUA 1+*   GLUCUA Negative   KETONESU Negative   BILIRUBINUA Negative   OCCULTUA 3+*   NITRITE Negative   UROBILINOGEN Negative   LEUKOCYTESUR 3+*   RBCUA 65*   WBCUA >100*   BACTERIA Rare   SQUAMEPITHEL 2   HYALINECASTS 81*       Microbiology Results (last 7 days)       Procedure Component Value Units Date/Time    Culture, Respiratory with Gram Stain [[id number]]   (Abnormal) Collected: 07/24/23 1148    Order Status: Completed Specimen: Respiratory from Sputum Updated: 07/25/23 0739     Respiratory Culture PRESUMPTIVE PSEUDOMONAS SPECIES  Many  Identification and susceptibility pending       Gram Stain (Respiratory) <10 epithelial cells per low power field.     Gram Stain (Respiratory) Many WBC's     Gram Stain (Respiratory) Moderate Gram negative rods    Urine culture [[id number]] Collected: 07/23/23 1602    Order Status: Completed Specimen: Urine Updated: 07/25/23 0651     Urine Culture, Routine No growth to date    Narrative:      Specimen Source->Urine    Blood culture x two cultures. Draw prior to antibiotics. [[id number]] Collected: 07/22/23 2044    Order Status: Completed Specimen: Blood Updated: 07/24/23 2232     Blood Culture, Routine No Growth to date      No Growth to date      No Growth to date    Narrative:      Aerobic and anaerobic  Collection has been rescheduled by 2MB at 07/22/2023 19:43 Reason: Amairani liz said she will call when she wants cultures drawn  Collection has been rescheduled by 2MB at 07/22/2023 19:43 Reason: Amairani liz said she will call when she wants cultures drawn    Blood culture x two cultures. Draw prior to antibiotics. [[id number]] Collected: 07/22/23 2050    Order Status: Completed Specimen: Blood Updated: 07/24/23 2232     Blood Culture, Routine No Growth to date      No Growth to date      No Growth to date    Narrative:      Aerobic and anaerobic  Collection has been rescheduled by 2MB at 07/22/2023 19:43 Reason: Amairani liz said she will call when she wants cultures drawn  Collection has been rescheduled by 2MB at 07/22/2023 19:43 Reason: Amairani liz said she will call when she wants cultures drawn    MRSA Screen by PCR [[id number]] Collected: 07/23/23 1610    Order Status: Completed Specimen: Nasopharyngeal Swab from Nasal Updated: 07/23/23 1750     MRSA SCREEN BY PCR Negative          Significant Imaging:   CTA Chest:  1.   No acute aortic abnormality or pulmonary embolism.  2.  Bilateral interstitial opacities with interlobular septal thickening and consolidation in the right lower lobe.  Imaging features can be seen with COVID pneumonia, though are nonspecific and can occur with a variety of infectious and noninfectious processes. PneInd.  3.  Mediastinal and bihilar lymphadenopathy.  4.  Small bilateral pleural effusions.  5.  No acute abdominal or pelvic abnormality.  6.  Splenomegaly.  7.  Hepatic steatosis.    CXR:   Slight increasing infiltrates in the lungs.      Assessment/Plan:      * Pneumonia of both lungs due to infectious organism  Pulm consulted  Continue vanc and merrem.  Hypothermia possibly related to underlying sepsis.  Follow microbiology results.  Continue intravenous antibiotics.  Will consult ID specialist for opinion.  Discussed with Dr. Mccormack.      Urine finding  Due to possible uti and hx of EBSL, will start merrem  F/u u/a and urine culture  Nursing staff in ER had trouble placing cath so patient currently has a pure wick (ICU will attempt to place carroll)   Bladder scan normal  CT abd/pelvis showed no abnormalities with bladder.      Ventilator dependence  Follow pulmonary recommendations.      Difficult Carroll catheter placement        Essential hypertension  This was listed in medical history but it doesn't look like patient takes anything for it  Continue to monitor      Persistent vegetative state  Turn patient q2 hours  Shower patient daily  Consult nutrition for diet recs    Anemia of chronic disease  Continue home med of iron QOD      D/W Dr. Schneider.  VTE Risk Mitigation (From admission, onward)         Ordered     enoxaparin injection 40 mg  Daily         07/23/23 0225     IP VTE HIGH RISK PATIENT  Once         07/23/23 0225     Place sequential compression device  Until discontinued         07/23/23 0225              Discharge Planning   [place]: 7/28/2023     Code Status: Full Code   Is the patient  medically ready for discharge?:     Reason for patient still in hospital (select all that apply): Patient trending condition and Consult recommendations  Discharge Plan A: Return to nursing home                  Geeta Siddiqi MD  Department of Hospital Medicine   [facility]

## 2023-07-25 NOTE — CARE UPDATE
07/25/23 0900   Patient Assessment/Suction   Level of Consciousness (AVPU) responds to pain   Respiratory Effort Normal;Unlabored   Expansion/Accessory Muscles/Retractions no use of accessory muscles;no retractions   All Lung Fields Breath Sounds coarse   Rhythm/Pattern, Respiratory assisted mechanically   Cough Frequency with stimulation   Cough Type assisted   Suction Method tracheal;oral   Suction Pressure (mmHg) -120 mmHg   $ Suction Charges Inline Suction Procedure Stat Charge   Secretions Amount small   Secretions Color creamy   Secretions Characteristics thick   Sputum Collection sample obtained per suctioning   $ Swab or suction? Suction   Skin Integrity   $ Wound Care Tech Time 15 min   Area Observed Left;Right;Neck under tracheostomy   Skin Appearance without discoloration   Barrier used? Other (see comments)   Barrier Changed? Yes   Barrier change next due   (with next trach care)   PRE-TX-O2   Device (Oxygen Therapy) ventilator   $ Is the patient on Low Flow Oxygen? Yes   Oxygen Concentration (%) 70   SpO2 96 %   Pulse Oximetry Type Continuous   $ Pulse Oximetry - Single Charge Pulse Oximetry - Single   Pulse 96   Resp (!) 39   Adult Surgical Airway Shiley Cuffed 6.0/ 75mm   No placement date or time found.   Present Prior to Hospital Arrival?: Yes  Type: Tracheostomy  Brand: Shiley  Airway Device Style: Cuffed  Airway Device Size: 6.0/ 75mm   Cuff Pressure   (Green Zone (Trucuff))   Cuff Inflation? Inflated   Status Secured   Site Assessment Clean;Dry   Site Care Cleansed;Dried;Dressing applied;Protective barrier to skin   Inner Cannula Care Changed/new   Ties Assessment Clean   Airway Safety   Is Ambu Bag and Mask with Patient? Yes, Adult Ambu Bag and Mask   Trach Supplies at Bedside Inner Cannula;Suction Catheter;Normal Saline Bullets;Trach Ties;10cc Syringes   Suction set is at the bedside? Yes   Extra trach at bedside? Yes   Extra trach sizes at bedside? 4;6   Is Obturator Available? Yes    Location of Obturator?  Bedside table   Vent Select   Conventional Vent Y   Charged w/in last 24h YES   Preset Conventional Ventilator Settings   Vent ID [id number]   Vent Type    Ventilation Type VC   Vent Mode A/C   Humidity HME   Set Rate 16 BPM   Vt Set 360 mL   PEEP/CPAP 5 cmH20   Pressure Support 0 cmH20   Waveform RAMP   Peak Flow 50 L/min   Plateau Set/Insp. Hold (sec) 0   Trigger Sensitivity Flow/I-Trigger 2.7 L/min   Patient Ventilator Parameters   Resp Rate Total 33 br/min   Peak Airway Pressure 31 cmH20   Mean Airway Pressure 17 cmH20   Plateau Pressure 0 cmH20   Exhaled Vt 339 mL   Total Ve 12.9 L/m   I:E Ratio Measured 1:1.20   Conventional Ventilator Alarms   Alarms On Y   Ve High Alarm 18 L/min   Ve Low Alarm 4 L/min   Vt High Alarm 860 mL   Vt Low Alarm 200 mL   Resp Rate High Alarm 50 br/min   Press High Alarm 60 cmH2O   Apnea Rate 10   Apnea Volume (mL) 0 mL   Apnea Oxygen Concentration  100   Apnea Flow Rate (L/min) 50   T Apnea 25 sec(s)   IHI Ventilator Associated Pneumonia Bundle (Required)   Daily Awakening Trials Performed Not applicable   Daily Assessment of Readiness to Extubate No (Comment)   Head of Bed Elevated  HOB 30   Oral Care Mouth suctioned   Vent Circut Breaks Minimized Yes   Ready to Wean/Extubation Screen   FIO2<=50 (chart decimal) (!) 0.7   MV<16L (chart vol.) 12.9   PEEP <=8 (chart #) 5   Ready to Wean Parameters   F/VT Ratio<105 (RSBI) 115.04   Education   $ Education 15 min;Ventilator Oxygen;Trach Care;Suction

## 2023-07-25 NOTE — PROGRESS NOTES
Pharmacokinetic Assessment Follow Up: IV Vancomycin    Vancomycin serum concentration assessment(s):    The random level was drawn correctly and can be used to guide therapy at this time. The measurement is above the desired definitive target range of 10 to 15 mcg/mL.    Vancomycin Regimen Plan:    Re-dose when the random level is less than 15 mcg/mL, next level to be drawn at 0430 on 07/27    Drug levels (last 3 results):  Recent Labs   Lab Result Units 07/24/23  1747 07/25/23  0324   Vancomycin-Trough ug/mL 38.9* 26.4*       Pharmacy will continue to follow and monitor vancomycin.    Please contact pharmacy at extension [phone] for questions regarding this assessment.    Thank you for the consult,   Isac Nuñez       Patient brief summary:  Genna Felix is a 62 y.o. female initiated on antimicrobial therapy with IV Vancomycin for treatment of lower respiratory infection    Drug Allergies:   Review of patient's allergies indicates:  No Known Allergies    Actual Body Weight:   89 kg    Renal Function:   Estimated Creatinine Clearance: 148.5 mL/min (A) (based on SCr of 0.4 mg/dL (L)).,     CBC (last 72 hours):  Recent Labs   Lab Result Units 07/22/23  1934 07/23/23  0315 07/24/23  0335 07/25/23  0324   WBC K/uL 7.82 8.36 4.56 8.71   Hemoglobin g/dL 9.4* 8.7* 7.6* 7.8*   Hematocrit % 33.5* 29.9* 26.0* 26.3*   Platelets K/uL 224 181 186 193   Gran % % 64.8 90.8* 66.7 70.5   Lymph % % 19.1 7.7* 25.4 17.7*   Mono % % 10.7 1.0* 7.7 9.6   Eosinophil % % 4.7 0.2 0.2 1.7   Basophil % % 0.3 0.1 0.0 0.3   Differential Method  Automated Automated Automated Automated       Metabolic Panel (last 72 hours):  Recent Labs   Lab Result Units 07/22/23  1934 07/23/23  0315 07/23/23  1602 07/24/23  0335 07/25/23  0324   Sodium mmol/L 139 139  --  144 143   Potassium mmol/L 4.5 5.0  --  4.1 3.6   Chloride mmol/L 102 104  --  112* 113*   CO2 mmol/L 29 27  --  27 24   Glucose mg/dL 89 165*  --  95 80   Glucose, UA   --   --  Negative  --   --     BUN mg/dL 32* 29*  --  24* 21*   Creatinine mg/dL <0.3* 0.3*  --  0.4* 0.4*   Albumin g/dL 3.7 3.4*  --  3.2* 3.2*   Total Bilirubin mg/dL 0.7 1.3*  --  1.2* 1.5*   Alkaline Phosphatase U/L 187* 157*  --  141* 148*   AST U/L 32 31  --  23 47*   ALT U/L 35 35  --  29 47*   Magnesium mg/dL  --  1.8  --  1.9 1.8   Phosphorus mg/dL  --  3.8  --  3.2 2.6*       Vancomycin Administrations:  vancomycin given in the last 96 hours                     vancomycin 1.5 g in dextrose 5 % 250 mL IVPB (ready to mix) (mg) 1,500 mg New Bag 07/24/23 0311     1,500 mg New Bag 07/23/23 1224    vancomycin in dextrose 5 % 1 gram/250 mL IVPB 2,000 mg (mg) 2,000 mg New Bag 07/23/23 0052                    Microbiologic Results:  Microbiology Results (last 7 days)       Procedure Component Value Units Date/Time    Blood culture x two cultures. Draw prior to antibiotics. [[id number]] Collected: 07/22/23 2044    Order Status: Completed Specimen: Blood Updated: 07/24/23 2232     Blood Culture, Routine No Growth to date      No Growth to date      No Growth to date    Narrative:      Aerobic and anaerobic  Collection has been rescheduled by 2MB at 07/22/2023 19:43 Reason: Amairani liz said she will call when she wants cultures drawn  Collection has been rescheduled by 2MB at 07/22/2023 19:43 Reason: Amairani liz said she will call when she wants cultures drawn    Blood culture x two cultures. Draw prior to antibiotics. [[id number]] Collected: 07/22/23 2050    Order Status: Completed Specimen: Blood Updated: 07/24/23 2232     Blood Culture, Routine No Growth to date      No Growth to date      No Growth to date    Narrative:      Aerobic and anaerobic  Collection has been rescheduled by 2MB at 07/22/2023 19:43 Reason: Amairani liz said she will call when she wants cultures drawn  Collection has been rescheduled by 2MB at 07/22/2023 19:43 Reason: Amairani liz said she will call when she wants cultures drawn    Culture, Respiratory with  Gram Stain [[id number]] Collected: 07/24/23 1148    Order Status: Completed Specimen: Respiratory from Sputum Updated: 07/24/23 1524     Gram Stain (Respiratory) <10 epithelial cells per low power field.     Gram Stain (Respiratory) Many WBC's     Gram Stain (Respiratory) Moderate Gram negative rods    Urine culture [[id number]] Collected: 07/23/23 1602    Order Status: Completed Specimen: Urine Updated: 07/24/23 0637     Urine Culture, Routine No growth to date    Narrative:      Specimen Source->Urine    MRSA Screen by PCR [[id number]] Collected: 07/23/23 1610    Order Status: Completed Specimen: Nasopharyngeal Swab from Nasal Updated: 07/23/23 1750     MRSA SCREEN BY PCR Negative

## 2023-07-25 NOTE — PROGRESS NOTES
Pharmacokinetic Assessment Follow Up: IV Vancomycin    Vancomycin serum concentration assessment(s):    The random level was drawn correctly and can be used to guide therapy at this time. The measurement is above the desired definitive target range of 10 to 15 mcg/mL.    Vancomycin Regimen Plan:    Re-dose when the random level is less than 15 mcg/mL, next level to be drawn at morning labs on 7/25/23.    Drug levels (last 3 results):  Recent Labs   Lab Result Units 07/24/23  1747   Vancomycin-Trough ug/mL 38.9*       Pharmacy will continue to follow and monitor vancomycin.    Please contact pharmacy at extension [phone] for questions regarding this assessment.    Thank you for the consult,   Cori Fairbanks       Patient brief summary:  Genna Felix is a 62 y.o. female initiated on antimicrobial therapy with IV Vancomycin for treatment of lower respiratory infection    The patient's current regimen is Vancomycin 1500 mg IV every 12 hours.    Drug Allergies:   Review of patient's allergies indicates:  No Known Allergies    Actual Body Weight:   89 kg    Renal Function:   Estimated Creatinine Clearance: 148.5 mL/min (A) (based on SCr of 0.4 mg/dL (L)).,     Dialysis Method (if applicable):  N/A    CBC (last 72 hours):  Recent Labs   Lab Result Units 07/22/23 1934 07/23/23 0315 07/24/23  0335   WBC K/uL 7.82 8.36 4.56   Hemoglobin g/dL 9.4* 8.7* 7.6*   Hematocrit % 33.5* 29.9* 26.0*   Platelets K/uL 224 181 186   Gran % % 64.8 90.8* 66.7   Lymph % % 19.1 7.7* 25.4   Mono % % 10.7 1.0* 7.7   Eosinophil % % 4.7 0.2 0.2   Basophil % % 0.3 0.1 0.0   Differential Method  Automated Automated Automated       Metabolic Panel (last 72 hours):  Recent Labs   Lab Result Units 07/22/23 1934 07/23/23 0315 07/23/23  1602 07/24/23  0335   Sodium mmol/L 139 139  --  144   Potassium mmol/L 4.5 5.0  --  4.1   Chloride mmol/L 102 104  --  112*   CO2 mmol/L 29 27  --  27   Glucose mg/dL 89 165*  --  95   Glucose, UA   --   --  Negative  --     BUN mg/dL 32* 29*  --  24*   Creatinine mg/dL <0.3* 0.3*  --  0.4*   Albumin g/dL 3.7 3.4*  --  3.2*   Total Bilirubin mg/dL 0.7 1.3*  --  1.2*   Alkaline Phosphatase U/L 187* 157*  --  141*   AST U/L 32 31  --  23   ALT U/L 35 35  --  29   Magnesium mg/dL  --  1.8  --  1.9   Phosphorus mg/dL  --  3.8  --  3.2       Vancomycin Administrations:  vancomycin given in the last 96 hours                     vancomycin 1.5 g in dextrose 5 % 250 mL IVPB (ready to mix) (mg) 1,500 mg New Bag 07/24/23 0311     1,500 mg New Bag 07/23/23 1224    vancomycin in dextrose 5 % 1 gram/250 mL IVPB 2,000 mg (mg) 2,000 mg New Bag 07/23/23 0052                    Microbiologic Results:  Microbiology Results (last 7 days)       Procedure Component Value Units Date/Time    Culture, Respiratory with Gram Stain [[id number]] Collected: 07/24/23 1148    Order Status: Completed Specimen: Respiratory from Sputum Updated: 07/24/23 1524     Gram Stain (Respiratory) <10 epithelial cells per low power field.     Gram Stain (Respiratory) Many WBC's     Gram Stain (Respiratory) Moderate Gram negative rods    Urine culture [[id number]] Collected: 07/23/23 1602    Order Status: Completed Specimen: Urine Updated: 07/24/23 0637     Urine Culture, Routine No growth to date    Narrative:      Specimen Source->Urine    Blood culture x two cultures. Draw prior to antibiotics. [[id number]] Collected: 07/22/23 2044    Order Status: Completed Specimen: Blood Updated: 07/23/23 2232     Blood Culture, Routine No Growth to date      No Growth to date    Narrative:      Aerobic and anaerobic  Collection has been rescheduled by 2MB at 07/22/2023 19:43 Reason: Amairani liz said she will call when she wants cultures drawn  Collection has been rescheduled by 2MB at 07/22/2023 19:43 Reason: Amairani liz said she will call when she wants cultures drawn    Blood culture x two cultures. Draw prior to antibiotics. [[id number]] Collected: 07/22/23 2050    Order Status:  Completed Specimen: Blood Updated: 07/23/23 2232     Blood Culture, Routine No Growth to date      No Growth to date    Narrative:      Aerobic and anaerobic  Collection has been rescheduled by 2MB at 07/22/2023 19:43 Reason: Amairani liz said she will call when she wants cultures drawn  Collection has been rescheduled by 2MB at 07/22/2023 19:43 Reason: Amairani liz said she will call when she wants cultures drawn    MRSA Screen by PCR [[id number]] Collected: 07/23/23 1610    Order Status: Completed Specimen: Nasopharyngeal Swab from Nasal Updated: 07/23/23 1750     MRSA SCREEN BY PCR Negative

## 2023-07-25 NOTE — SUBJECTIVE & OBJECTIVE
Interval History:  On ventilator, no acute distress, normothermic Sputum Cx growing Pseudomonas. Off IV Vancomycin. Receiving intravenous Merrem antibiotic therapy.    Review of Systems   Unable to perform ROS: Patient nonverbal   Objective:     Vital Signs (Most Recent):  Temp: (!) 95.2 °F (35.1 °C) (07/24/23 0315)  Pulse: 95 (07/25/23 0707)  Resp: (!) 25 (07/25/23 0707)  BP: (!) 98/59 (07/24/23 1701)  SpO2: 97 % (07/25/23 0707) Vital Signs (24h Range):  Pulse:  [] 95  Resp:  [22-39] 25  SpO2:  [89 %-97 %] 97 %  BP: (88-98)/(51-59) 98/59     Weight: 89 kg (196 lb 3.4 oz)  Body mass index is 38.32 kg/m².    Intake/Output Summary (Last 24 hours) at 7/25/2023 0900  Last data filed at 7/25/2023 0655  Gross per 24 hour   Intake 1719.8 ml   Output 1250 ml   Net 469.8 ml           Physical Exam  Constitutional:       General: She is not in acute distress.     Appearance: She is not ill-appearing.   HENT:      Head: Normocephalic and atraumatic.   Cardiovascular:      Rate and Rhythm: Normal rate and regular rhythm.   Pulmonary:      Breath sounds: Wheezing present.      Comments: Vent in place  Abdominal:      General: There is distension.   Neurological:      Mental Status: She is alert.   Psychiatric:      Comments: Patient non-verbal           Significant Labs: All pertinent labs within the past 24 hours have been reviewed.  CBC:   Recent Labs   Lab 07/24/23  0335 07/25/23  0324   WBC 4.56 8.71   HGB 7.6* 7.8*   HCT 26.0* 26.3*    193       CMP:   Recent Labs   Lab 07/24/23  0335 07/25/23  0324    143   K 4.1 3.6   * 113*   CO2 27 24   GLU 95 80   BUN 24* 21*   CREATININE 0.4* 0.4*   CALCIUM 9.1 8.6*   PROT 8.6* 8.6*   ALBUMIN 3.2* 3.2*   BILITOT 1.2* 1.5*   ALKPHOS 141* 148*   AST 23 47*   ALT 29 47*   ANIONGAP 5* 6*       Coagulation: No results for input(s): PT, INR, APTT in the last 48 hours.  Lactic Acid:   No results for input(s): LACTATE in the last 48 hours.    Troponin:   No results  for input(s): TROPONINI, TROPONINIHS in the last 48 hours.    TSH:   Recent Labs   Lab 07/23/23  0315   TSH 1.880       Urine Studies:   Recent Labs   Lab 07/23/23  1602   COLORU Yellow   APPEARANCEUA Hazy*   PHUR 7.0   SPECGRAV 1.010   PROTEINUA 1+*   GLUCUA Negative   KETONESU Negative   BILIRUBINUA Negative   OCCULTUA 3+*   NITRITE Negative   UROBILINOGEN Negative   LEUKOCYTESUR 3+*   RBCUA 65*   WBCUA >100*   BACTERIA Rare   SQUAMEPITHEL 2   HYALINECASTS 81*       Microbiology Results (last 7 days)       Procedure Component Value Units Date/Time    Culture, Respiratory with Gram Stain [[id number]]  (Abnormal) Collected: 07/24/23 1148    Order Status: Completed Specimen: Respiratory from Sputum Updated: 07/25/23 0739     Respiratory Culture PRESUMPTIVE PSEUDOMONAS SPECIES  Many  Identification and susceptibility pending       Gram Stain (Respiratory) <10 epithelial cells per low power field.     Gram Stain (Respiratory) Many WBC's     Gram Stain (Respiratory) Moderate Gram negative rods    Urine culture [[id number]] Collected: 07/23/23 1602    Order Status: Completed Specimen: Urine Updated: 07/25/23 0651     Urine Culture, Routine No growth to date    Narrative:      Specimen Source->Urine    Blood culture x two cultures. Draw prior to antibiotics. [[id number]] Collected: 07/22/23 2044    Order Status: Completed Specimen: Blood Updated: 07/24/23 2232     Blood Culture, Routine No Growth to date      No Growth to date      No Growth to date    Narrative:      Aerobic and anaerobic  Collection has been rescheduled by 2MB at 07/22/2023 19:43 Reason: Amairani liz said she will call when she wants cultures drawn  Collection has been rescheduled by 2MB at 07/22/2023 19:43 Reason: Amairani liz said she will call when she wants cultures drawn    Blood culture x two cultures. Draw prior to antibiotics. [[id number]] Collected: 07/22/23 2050    Order Status: Completed Specimen: Blood Updated: 07/24/23 2232     Blood  Culture, Routine No Growth to date      No Growth to date      No Growth to date    Narrative:      Aerobic and anaerobic  Collection has been rescheduled by 2MB at 07/22/2023 19:43 Reason: Amairani liz said she will call when she wants cultures drawn  Collection has been rescheduled by 2MB at 07/22/2023 19:43 Reason: Amairani liz said she will call when she wants cultures drawn    MRSA Screen by PCR [[id number]] Collected: 07/23/23 1610    Order Status: Completed Specimen: Nasopharyngeal Swab from Nasal Updated: 07/23/23 1750     MRSA SCREEN BY PCR Negative          Significant Imaging:   CTA Chest:  1.  No acute aortic abnormality or pulmonary embolism.  2.  Bilateral interstitial opacities with interlobular septal thickening and consolidation in the right lower lobe.  Imaging features can be seen with COVID pneumonia, though are nonspecific and can occur with a variety of infectious and noninfectious processes. PneInd.  3.  Mediastinal and bihilar lymphadenopathy.  4.  Small bilateral pleural effusions.  5.  No acute abdominal or pelvic abnormality.  6.  Splenomegaly.  7.  Hepatic steatosis.    CXR:   Slight increasing infiltrates in the lungs.

## 2023-07-25 NOTE — PLAN OF CARE
Problem: Infection  Goal: Absence of Infection Signs and Symptoms  Outcome: Ongoing, Progressing     Problem: Adult Inpatient Plan of Care  Goal: Plan of Care Review  Outcome: Ongoing, Progressing     Problem: Fluid Imbalance (Pneumonia)  Goal: Fluid Balance  Outcome: Ongoing, Progressing     Problem: Infection (Pneumonia)  Goal: Resolution of Infection Signs and Symptoms  Outcome: Ongoing, Progressing     Problem: Respiratory Compromise (Pneumonia)  Goal: Effective Oxygenation and Ventilation  Outcome: Ongoing, Progressing     Problem: Skin Injury Risk Increased  Goal: Skin Health and Integrity  Outcome: Ongoing, Progressing     Problem: Communication Impairment (Mechanical Ventilation, Invasive)  Goal: Effective Communication  Outcome: Ongoing, Progressing

## 2023-07-25 NOTE — PLAN OF CARE
Problem: Infection  Goal: Absence of Infection Signs and Symptoms  Outcome: Ongoing, Not Progressing     Problem: Adult Inpatient Plan of Care  Goal: Plan of Care Review  Outcome: Ongoing, Not Progressing  Goal: Patient-Specific Goal (Individualized)  Outcome: Ongoing, Not Progressing  Goal: Absence of Hospital-Acquired Illness or Injury  Outcome: Ongoing, Not Progressing  Goal: Optimal Comfort and Wellbeing  Outcome: Ongoing, Not Progressing  Goal: Readiness for Transition of Care  Outcome: Ongoing, Not Progressing     Problem: Fluid Imbalance (Pneumonia)  Goal: Fluid Balance  Outcome: Ongoing, Not Progressing     Problem: Infection (Pneumonia)  Goal: Resolution of Infection Signs and Symptoms  Outcome: Ongoing, Not Progressing     Problem: Respiratory Compromise (Pneumonia)  Goal: Effective Oxygenation and Ventilation  Outcome: Ongoing, Not Progressing     Problem: Fall Injury Risk  Goal: Absence of Fall and Fall-Related Injury  Outcome: Ongoing, Not Progressing     Problem: Impaired Wound Healing  Goal: Optimal Wound Healing  Outcome: Ongoing, Not Progressing     Problem: Skin Injury Risk Increased  Goal: Skin Health and Integrity  Outcome: Ongoing, Not Progressing     Problem: Communication Impairment (Mechanical Ventilation, Invasive)  Goal: Effective Communication  Outcome: Ongoing, Not Progressing     Problem: Device-Related Complication Risk (Mechanical Ventilation, Invasive)  Goal: Optimal Device Function  Outcome: Ongoing, Not Progressing     Problem: Inability to Wean (Mechanical Ventilation, Invasive)  Goal: Mechanical Ventilation Liberation  Outcome: Ongoing, Not Progressing     Problem: Nutrition Impairment (Mechanical Ventilation, Invasive)  Goal: Optimal Nutrition Delivery  Outcome: Ongoing, Not Progressing     Problem: Skin and Tissue Injury (Mechanical Ventilation, Invasive)  Goal: Absence of Device-Related Skin and Tissue Injury  Outcome: Ongoing, Not Progressing     Problem: Ventilator-Induced  Lung Injury (Mechanical Ventilation, Invasive)  Goal: Absence of Ventilator-Induced Lung Injury  Outcome: Ongoing, Not Progressing     Problem: Communication Impairment (Artificial Airway)  Goal: Effective Communication  Outcome: Ongoing, Not Progressing     Problem: Device-Related Complication Risk (Artificial Airway)  Goal: Optimal Device Function  Outcome: Ongoing, Not Progressing     Problem: Skin and Tissue Injury (Artificial Airway)  Goal: Absence of Device-Related Skin or Tissue Injury  Outcome: Ongoing, Not Progressing     Problem: Noninvasive Ventilation Acute  Goal: Effective Unassisted Ventilation and Oxygenation  Outcome: Ongoing, Not Progressing     Problem: Aspiration (Enteral Nutrition)  Goal: Absence of Aspiration Signs and Symptoms  Outcome: Ongoing, Not Progressing     Problem: Device-Related Complication Risk (Enteral Nutrition)  Goal: Safe, Effective Therapy Delivery  Outcome: Ongoing, Not Progressing     Problem: Feeding Intolerance (Enteral Nutrition)  Goal: Feeding Tolerance  Outcome: Ongoing, Not Progressing

## 2023-07-25 NOTE — CARE UPDATE
07/24/23 1954   Patient Assessment/Suction   Level of Consciousness (AVPU) responds to pain   Respiratory Effort Normal;Unlabored   Expansion/Accessory Muscles/Retractions no use of accessory muscles   All Lung Fields Breath Sounds equal bilaterally;coarse   Rhythm/Pattern, Respiratory assisted mechanically   Cough Frequency with stimulation   Cough Type assisted   Suction Method tracheal;oral   $ Suction Charges Inline Suction Procedure Stat Charge   Secretions Amount moderate   Secretions Color pale;yellow   Secretions Characteristics thick   Skin Integrity   $ Wound Care Tech Time 15 min   Area Observed Neck under tracheostomy   Skin Appearance without discoloration   Barrier Changed? Yes   PRE-TX-O2   Device (Oxygen Therapy) ventilator   Oxygen Concentration (%) 70   SpO2 96 %   Pulse Oximetry Type Continuous   $ Pulse Oximetry - Multiple Charge Pulse Oximetry - Multiple   Pulse 88   Resp (!) 28   Aerosol Therapy   $ Aerosol Therapy Charges Aerosol Treatment   Daily Review of Necessity (SVN) completed   Respiratory Treatment Status (SVN) given   Treatment Route (SVN) tracheostomy;ventilator   Patient Position (SVN) HOB elevated   Post Treatment Assessment (SVN) breath sounds unchanged   Signs of Intolerance (SVN) none   Breath Sounds Post-Respiratory Treatment   Post-treatment Heart Rate (beats/min) 92   Post-treatment Resp Rate (breaths/min) 30   Adult Surgical Airway Shiley Cuffed 6.0/ 75mm   No placement date or time found.   Present Prior to Hospital Arrival?: Yes  Type: Tracheostomy  Brand: Shiley  Airway Device Style: Cuffed  Airway Device Size: 6.0/ 75mm   Cuff Inflation? Inflated   Status Secured   Site Assessment Clean;Dry   Site Care Cleansed;Dried;Dressing applied   Inner Cannula Care Cleansed/dried   Ties Assessment Clean;Dry;Intact;Secure   Airway Safety   Is Ambu Bag and Mask with Patient? Yes, Adult Ambu Bag and Mask   Extra trach at bedside? Yes   Respiratory Interventions   Airway/Ventilation  Management airway patency maintained   Vent Select   Conventional Vent Y   $ Ventilator Subsequent 1   Charged w/in last 24h YES   Preset Conventional Ventilator Settings   Vent ID [id number]   Vent Type    Ventilation Type VC   Vent Mode A/C   Humidity HME   Set Rate 16 BPM   Vt Set 360 mL   PEEP/CPAP 5 cmH20   Pressure Support 0 cmH20   Waveform RAMP   Peak Flow 50 L/min   Plateau Set/Insp. Hold (sec) 0   Trigger Sensitivity Flow/I-Trigger 2.7 L/min   Patient Ventilator Parameters   Resp Rate Total 24 br/min   Peak Airway Pressure 45 cmH20   Mean Airway Pressure 16 cmH20   Plateau Pressure 0 cmH20   Exhaled Vt 397 mL   Total Ve 8.95 L/m   I:E Ratio Measured 1:2.30   Tubing ID (mm) 6 mm   Tube Type Trach   Conventional Ventilator Alarms   Alarms On Y   Resp Rate High Alarm 50 br/min   Press High Alarm 60 cmH2O   Apnea Rate 10   Apnea Volume (mL) 0 mL   Apnea Oxygen Concentration  100   Apnea Flow Rate (L/min) 50   T Apnea 25 sec(s)   IHI Ventilator Associated Pneumonia Bundle (Required)   Oral Care Mouth suctioned   Ready to Wean/Extubation Screen   FIO2<=50 (chart decimal) (!) 0.7   MV<16L (chart vol.) 8.95   PEEP <=8 (chart #) 5   Ready to Wean Parameters   F/VT Ratio<105 (RSBI) (!) 70.53   Education   $ Education Bronchodilator;Suction;Trach Care;Ventilator Oxygen;15 min   Respiratory Evaluation   $ Care Plan Tech Time 15 min   $ Eval/Re-eval Charges Re-evaluation   Evaluation For Re-Eval 3 day

## 2023-07-25 NOTE — PROGRESS NOTES
07/25/2023      Admit Date: 7/22/2023  Genna Felix  New Patient Consult    Chief Complaint   Patient presents with    Shortness of Breath     Arrived from [place] via [facility] with c/c SOB that started earlier today       History of Present Illness:  Pt is a 61 yo female with hepatic steatosis, PE, GERD, anoxic brain injury dependent on trach/vent and PEG who presented to the ED with desaturation. She was found to have pneumonia and UTI. Pulmonary is consulted for vent management. She is hypothermic requiring warming blanket today. Hx is gathered from chart review. There is no family at bedside.    7/24/23: Minimal urine output. Unable to place Hamilton. Obtaining bladder scan -- unable to quantify, but full of fluid. Re-attempting Hamilton.    7/25/23: Stable.      PFSH:  Past Medical History:   Diagnosis Date    Anoxic brain damage 07/2020    Bedbound 07/2020    Cardiac arrest 07/2020    Cirrhosis     GERD (gastroesophageal reflux disease)     Hemangioma of intra-abdominal structure     Hypertension     Nursing home resident     Protein calorie malnutrition     Pulmonary embolus     Respiratory distress     S/P percutaneous endoscopic gastrostomy (PEG) tube placement 07/2020    Total self-care deficit 07/2020    Tracheostomy dependence     7/2020     Past Surgical History:   Procedure Laterality Date    PEG W/TRACHEOSTOMY PLACEMENT  07/27/2020    SKIN GRAFT      buttocks     Social History     Tobacco Use    Smoking status: Never    Smokeless tobacco: Never   Substance Use Topics    Alcohol use: Not Currently    Drug use: Not Currently     Family History   Problem Relation Age of Onset    Hypertension Mother     No Known Problems Father      Review of patient's allergies indicates:  No Known Allergies    Performance Status:Performance Status:The patient's activity level is bedbound.    Review of Systems:  due to neurologic status/impairments a     Exam:Comprehensive exam done. BP (!) 98/59   Pulse 95   Temp (!)  95.2 °F (35.1 °C) (Axillary)   Resp (!) 25   Ht 5' (1.524 m)   Wt 89 kg (196 lb 3.4 oz)   SpO2 97%   Breastfeeding No   BMI 38.32 kg/m²       PHYSICAL EXAM  GENERAL:  NAD.  HEENT: EOMI. PERRLA. Opens eyes spontaneously, but no response to confrontation.  NECK: No cervical adenopathy palpated. No JVD or HJR.  HEART: Regular rate and rhythm. No murmur or gallop auscultated.  LUNGS: Coarse airway sounds, but no crackles or wheezing on anterior auscultation.  ABDOMEN: Bowel sounds present. Soft, non-tender, non-distended.  EXTREMITIES: Normal muscle tone and joint movement, no cyanosis or clubbing.   LYMPHATICS: No adenopathy palpated, no edema.  SKIN: Dry, intact, no lesions.   NEURO: Unresponsive.  PSYCH: Unable to assess.    Imaging Results              CTA Chest Abdomen Pelvis (Final result)  Result time 07/22/23 22:24:45      Final result by Danny Rasmussen DO (07/22/23 22:24:45)                   Narrative:    PROCEDURE:  CT Angiography Chest, Abdomen and Pelvis With Intravenous Contrast    CLINICAL INDICATION:  The patient is 62 years old and is Female; respiratory distress    TECHNIQUE:  Axial computed tomographic angiography images of the chest, abdomen and pelvis with intravenous contrast.  This CT exam was performed using one or more of the following dose reduction techniques:  automated exposure control, adjustment of the mA and/or kV according to patient size, and/or use of iterative reconstruction technique.  MIP reconstructed images were created and reviewed.    DLP: 955 mGycm    CONTRAST:  100mL of Omnipaque 350 was administered intravenously.    COMPARISON:  Chest radiograph of the same day and CT abdomen and pelvis dated 7/6/2023.    FINDINGS:    VASCULATURE:  AORTA:  No acute findings.  No aortic aneurysm.  No dissection.  PULMONARY ARTERIES:  Unremarkable as visualized.  No pulmonary embolism is identified.  GREAT VESSELS OF AORTIC ARCH:  No acute findings.  No dissection.  No arterial  occlusion or significant stenosis.  CELIAC TRUNK AND MESENTERIC ARTERIES:  No acute findings.  No occlusion or significant stenosis.  RENAL ARTERIES:  No acute findings.  No occlusion or significant stenosis.  ILIAC ARTERIES:  No acute findings.  No occlusion or significant stenosis.    CHEST:  LUNGS:  Bilateral interstitial opacities with interlobular septal thickening and consolidation in the right lower lobe.  PLEURAL SPACE:  Small bilateral pleural effusions.  No pneumothorax.  HEART:  Unremarkable.  No cardiomegaly.  No significant pericardial effusion.  MEDIASTINUM:  Mediastinal and bihilar lymphadenopathy.    ABDOMEN:  LIVER:  Hepatic steatosis.  GALLBLADDER AND BILE DUCTS:  Unremarkable.  No calcified stones.  No ductal dilation.  PANCREAS:  Unremarkable.  No ductal dilation.  No mass.  SPLEEN:  Splenomegaly.  ADRENALS:  Unremarkable.  No mass.  KIDNEYS AND URETERS:  Unremarkable.  No hydronephrosis.  No solid mass.  STOMACH AND BOWEL:  Unremarkable.  No obstruction.  No mucosal thickening.    PELVIS:  APPENDIX:  No findings to suggest acute appendicitis.  BLADDER:  Bladder is decompressed.  REPRODUCTIVE:  Prior hysterectomy.    CHEST, ABDOMEN and PELVIS:  INTRAPERITONEAL SPACE:  Unremarkable.  No significant fluid collection.  No free air.  BONES/JOINTS:  Severe heterotopic bone formation arising from the left acetabulum. Milder similar-appearing finding on the right. Advanced left shoulder degenerative changes.  Diffuse osteopenia. Multilevel degenerative changes.  No acute fracture.  No dislocation.  SOFT TISSUES:  Fat-containing umbilical hernia.  LYMPH NODES:  Unremarkable.  No enlarged lymph nodes.  TUBES, LINES AND DEVICES:  Partially evaluated gastrostomy and tracheostomy tubes.    IMPRESSION:    1.  No acute aortic abnormality or pulmonary embolism.    2.  Bilateral interstitial opacities with interlobular septal thickening and consolidation in the right lower lobe.  Imaging features can be seen  with COVID pneumonia, though are nonspecific and can occur with a variety of infectious and noninfectious processes. PneInd.    3.  Mediastinal and bihilar lymphadenopathy.    4.  Small bilateral pleural effusions.    5.  No acute abdominal or pelvic abnormality.    6.  Splenomegaly.    7.  Hepatic steatosis.    Electronically signed by:  Danny Rasmussen DO  7/22/2023 10:24 PM CDT Workstation: [id number]                                     X-Ray Chest AP Portable (Final result)  Result time 07/22/23 19:49:01      Final result by Jayant Junior MD (07/22/23 19:49:01)                   Narrative:    CLINICAL DATA:  Sepsis and shortness of breath    TECHNIQUE:  Views: A single AP view.  Limitations: The images are technically satisfactory.    COMPARISON:  July 8, 2023    FINDINGS:  CARDIOVASCULAR: Normal.    LUNGS AND PLEURA:  1. Infiltrative changes are seen diffusely throughout both lungs. Allowing for technical differences these have increased slightly since last examination.    MEDIASTINAL AND HILAR STRUCTURES: Normal.    OSSEOUS STRUCTURES: Normal.    ADDITIONAL FINDINGS:  1. The tracheostomy tube remains in good position.    IMPRESSION:    1.  Slight increasing infiltrates in the lungs.    This document was created using a voice recognition transcribing system. Incorrect words or phrases may have been missed during proof reading. Please interpret accordingly or contact the radiologist for clarification if necessary.    Electronically signed by:  Jayant Junior MD  7/22/2023 7:49 PM CDT Workstation: [id number]                                        Labs     Recent Labs   Lab 07/25/23  0324   WBC 8.71   HGB 7.8*   HCT 26.3*        Recent Labs   Lab 07/25/23  0324      K 3.6   *   CO2 24   BUN 21*   CREATININE 0.4*   GLU 80   CALCIUM 8.6*   MG 1.8   PHOS 2.6*   AST 47*   ALT 47*   ALKPHOS 148*   BILITOT 1.5*   PROT 8.6*   ALBUMIN 3.2*     No results for input(s): PH, PCO2, PO2, HCO3 in  the last 24 hours.    Microbiology Results (last 7 days)       Procedure Component Value Units Date/Time    Culture, Respiratory with Gram Stain [[id number]]  (Abnormal) Collected: 07/24/23 1148    Order Status: Completed Specimen: Respiratory from Sputum Updated: 07/25/23 0739     Respiratory Culture PRESUMPTIVE PSEUDOMONAS SPECIES  Many  Identification and susceptibility pending       Gram Stain (Respiratory) <10 epithelial cells per low power field.     Gram Stain (Respiratory) Many WBC's     Gram Stain (Respiratory) Moderate Gram negative rods    Urine culture [[id number]] Collected: 07/23/23 1602    Order Status: Completed Specimen: Urine Updated: 07/25/23 0651     Urine Culture, Routine No growth to date    Narrative:      Specimen Source->Urine    Blood culture x two cultures. Draw prior to antibiotics. [[id number]] Collected: 07/22/23 2044    Order Status: Completed Specimen: Blood Updated: 07/24/23 2232     Blood Culture, Routine No Growth to date      No Growth to date      No Growth to date    Narrative:      Aerobic and anaerobic  Collection has been rescheduled by 2MB at 07/22/2023 19:43 Reason: Amairani liz said she will call when she wants cultures drawn  Collection has been rescheduled by 2MB at 07/22/2023 19:43 Reason: Amairani liz said she will call when she wants cultures drawn    Blood culture x two cultures. Draw prior to antibiotics. [[id number]] Collected: 07/22/23 2050    Order Status: Completed Specimen: Blood Updated: 07/24/23 2232     Blood Culture, Routine No Growth to date      No Growth to date      No Growth to date    Narrative:      Aerobic and anaerobic  Collection has been rescheduled by 2MB at 07/22/2023 19:43 Reason: Amairani liz said she will call when she wants cultures drawn  Collection has been rescheduled by 2MB at 07/22/2023 19:43 Reason: Amairani liz said she will call when she wants cultures drawn    MRSA Screen by PCR [[id number]] Collected: 07/23/23 1610    Order  Status: Completed Specimen: Nasopharyngeal Swab from Nasal Updated: 07/23/23 1750     MRSA SCREEN BY PCR Negative          CXR 7/25/23: Dense opacifications along the lateral periphery of the left lung, which could represent a large pneumonia; there are also diffuse bilateral alveolar opacities otherwise. This is similar to 7/21/23, but the left opacities are more prominent.    Impression and Plan:  Active Hospital Problems    Diagnosis  POA    *Pneumonia of both lungs due to infectious organism [J18.9]  Yes    Pressure injury of buttock, stage 2 [L89.302]  Unknown    Hypoxia [R09.02]  Yes    Urine finding [R82.90]  Yes    Ventilator dependence [Z99.11]  Not Applicable    Essential hypertension [I10]  Yes    Anemia of chronic disease [D63.8]  Yes    Persistent vegetative state [R40.3]  Yes      Resolved Hospital Problems   No resolved problems to display.     Acute on chronic hypoxemic/hypercapneic resp failure  Vegetative state with trach/vent and PEG dependence  Ventilator-associated pneumonia due to P. aeruginosa  UTI, complicated  Normocytic anemia  - continue vent; no weaning  - continue antibiotics as per ID  - f/u cultures  - monitor H/H  - continue airway clearance regimen  - continue feeds via PEG    Upon my evaluation, this patient had a high probability of imminent or life-threatening deterioration, which required my direct attention, intervention, and personal management.    I have personally provided at least 35 minutes of critical care time exclusive of time spent on separately billable procedures. Over 50% of the time of this encounter was spent in direct care at the bedside. Time includes review of laboratory data, radiology results, discussion with consultants, and monitoring for potential decompensation. Interventions were performed as documented above.    Yonas Ritchie MD  Pulmonary and Critical Care Medicine  Pending sale to [facility]  Date of Service:  07/25/2023  10:26 AM

## 2023-07-25 NOTE — PROGRESS NOTES
Pharmacy Consult Discontinuation: Vancomycin    Genna Felix [id number] is a 62 y.o. female was consulted for vancomycin pharmacotherapy management by pharmacy.    Pharmacy consult for vancomycin dosing is no longer required.  Vancomycin was discontinued 07/25/2023 @ 0859 by Wyatt.    Thank you for allowing us to participate in this patient's care. Should you have any questions or concerns please feel free to contact the pharmacy department at [phone].    Odell Hunter RPH

## 2023-07-26 PROBLEM — R78.81 BACTEREMIA: Status: RESOLVED | Noted: 2021-10-19 | Resolved: 2023-07-26

## 2023-07-26 PROBLEM — E87.1 HYPONATREMIA: Status: RESOLVED | Noted: 2022-01-04 | Resolved: 2023-07-26

## 2023-07-26 PROBLEM — Z43.4 CHOLECYSTOSTOMY CARE: Status: RESOLVED | Noted: 2022-08-22 | Resolved: 2023-07-26

## 2023-07-26 PROBLEM — N39.0 URINARY TRACT INFECTION DUE TO PROTEUS: Status: RESOLVED | Noted: 2023-07-07 | Resolved: 2023-07-26

## 2023-07-26 PROBLEM — J96.20 ACUTE ON CHRONIC RESPIRATORY FAILURE: Status: ACTIVE | Noted: 2022-07-29

## 2023-07-26 PROBLEM — K76.0 HEPATIC STEATOSIS: Status: ACTIVE | Noted: 2023-07-26

## 2023-07-26 PROBLEM — B96.4 URINARY TRACT INFECTION DUE TO PROTEUS: Status: RESOLVED | Noted: 2023-07-07 | Resolved: 2023-07-26

## 2023-07-26 PROBLEM — J95.851 VAP (VENTILATOR-ASSOCIATED PNEUMONIA): Status: RESOLVED | Noted: 2022-03-18 | Resolved: 2023-07-26

## 2023-07-26 PROBLEM — J95.851 VENTILATOR ASSOCIATED PNEUMONIA: Status: RESOLVED | Noted: 2021-12-27 | Resolved: 2023-07-26

## 2023-07-26 PROBLEM — E66.9 OBESITY (BMI 30-39.9): Status: ACTIVE | Noted: 2023-07-26

## 2023-07-26 LAB
ALBUMIN SERPL BCP-MCNC: 3.4 G/DL (ref 3.5–5.2)
ALLENS TEST: ABNORMAL
ALLENS TEST: ABNORMAL
ALP SERPL-CCNC: 172 U/L (ref 55–135)
ALT SERPL W/O P-5'-P-CCNC: 57 U/L (ref 10–44)
ANION GAP SERPL CALC-SCNC: 7 MMOL/L (ref 8–16)
AST SERPL-CCNC: 48 U/L (ref 10–40)
BACTERIA UR CULT: NO GROWTH
BASOPHILS # BLD AUTO: 0.02 K/UL (ref 0–0.2)
BASOPHILS NFR BLD: 0.2 % (ref 0–1.9)
BILIRUB SERPL-MCNC: 1.9 MG/DL (ref 0.1–1)
BUN SERPL-MCNC: 14 MG/DL (ref 6–20)
CALCIUM SERPL-MCNC: 9.1 MG/DL (ref 8.7–10.5)
CHLORIDE SERPL-SCNC: 109 MMOL/L (ref 95–110)
CO2 SERPL-SCNC: 24 MMOL/L (ref 23–29)
CREAT SERPL-MCNC: 0.4 MG/DL (ref 0.5–1.4)
DELSYS: ABNORMAL
DELSYS: ABNORMAL
DIFFERENTIAL METHOD: ABNORMAL
EOSINOPHIL # BLD AUTO: 0.4 K/UL (ref 0–0.5)
EOSINOPHIL NFR BLD: 3.6 % (ref 0–8)
ERYTHROCYTE [DISTWIDTH] IN BLOOD BY AUTOMATED COUNT: 22 % (ref 11.5–14.5)
ERYTHROCYTE [SEDIMENTATION RATE] IN BLOOD BY WESTERGREN METHOD: 16 MM/H
ERYTHROCYTE [SEDIMENTATION RATE] IN BLOOD BY WESTERGREN METHOD: 24 MM/H
EST. GFR  (NO RACE VARIABLE): >60 ML/MIN/1.73 M^2
FIO2: 70
FIO2: 70
GLUCOSE SERPL-MCNC: 103 MG/DL (ref 70–110)
GLUCOSE SERPL-MCNC: 108 MG/DL (ref 70–110)
HCO3 UR-SCNC: 26.3 MMOL/L (ref 24–28)
HCO3 UR-SCNC: 28 MMOL/L (ref 24–28)
HCT VFR BLD AUTO: 33.9 % (ref 37–48.5)
HCT VFR BLD CALC: 32 %PCV (ref 36–54)
HGB BLD-MCNC: 9.5 G/DL (ref 12–16)
IMM GRANULOCYTES # BLD AUTO: 0.09 K/UL (ref 0–0.04)
IMM GRANULOCYTES NFR BLD AUTO: 0.8 % (ref 0–0.5)
LYMPHOCYTES # BLD AUTO: 1 K/UL (ref 1–4.8)
LYMPHOCYTES NFR BLD: 8.9 % (ref 18–48)
MAGNESIUM SERPL-MCNC: 1.6 MG/DL (ref 1.6–2.6)
MCH RBC QN AUTO: 26.3 PG (ref 27–31)
MCHC RBC AUTO-ENTMCNC: 28 G/DL (ref 32–36)
MCV RBC AUTO: 94 FL (ref 82–98)
MIN VOL: 10
MIN VOL: 9.3
MODE: ABNORMAL
MODE: ABNORMAL
MONOCYTES # BLD AUTO: 0.9 K/UL (ref 0.3–1)
MONOCYTES NFR BLD: 8.4 % (ref 4–15)
NEUTROPHILS # BLD AUTO: 8.4 K/UL (ref 1.8–7.7)
NEUTROPHILS NFR BLD: 78.1 % (ref 38–73)
NRBC BLD-RTO: 0 /100 WBC
PCO2 BLDA: 55.5 MMHG (ref 35–45)
PCO2 BLDA: 60.8 MMHG (ref 35–45)
PEEP: 10
PEEP: 10
PH SMN: 7.24 [PH] (ref 7.35–7.45)
PH SMN: 7.31 [PH] (ref 7.35–7.45)
PHOSPHATE SERPL-MCNC: 3.5 MG/DL (ref 2.7–4.5)
PIP: 18
PIP: 26
PLATELET # BLD AUTO: 219 K/UL (ref 150–450)
PMV BLD AUTO: 11.2 FL (ref 9.2–12.9)
PO2 BLDA: 63 MMHG (ref 80–100)
PO2 BLDA: 72 MMHG (ref 80–100)
POC BE: -1 MMOL/L
POC BE: 2 MMOL/L
POC IONIZED CALCIUM: 1.31 MMOL/L (ref 1.06–1.42)
POC SATURATED O2: 87 % (ref 95–100)
POC SATURATED O2: 92 % (ref 95–100)
POC TCO2: 28 MMOL/L (ref 23–27)
POC TCO2: 30 MMOL/L (ref 23–27)
POTASSIUM BLD-SCNC: 4.1 MMOL/L (ref 3.5–5.1)
POTASSIUM SERPL-SCNC: 4 MMOL/L (ref 3.5–5.1)
PROT SERPL-MCNC: 9.3 G/DL (ref 6–8.4)
RBC # BLD AUTO: 3.61 M/UL (ref 4–5.4)
SAMPLE: ABNORMAL
SAMPLE: ABNORMAL
SITE: ABNORMAL
SITE: ABNORMAL
SODIUM BLD-SCNC: 143 MMOL/L (ref 136–145)
SODIUM SERPL-SCNC: 140 MMOL/L (ref 136–145)
SP02: 94
SP02: 98
VT: 360
VT: 360
WBC # BLD AUTO: 10.8 K/UL (ref 3.9–12.7)

## 2023-07-26 PROCEDURE — 99233 SBSQ HOSP IP/OBS HIGH 50: CPT | Mod: ,,, | Performed by: STUDENT IN AN ORGANIZED HEALTH CARE EDUCATION/TRAINING PROGRAM

## 2023-07-26 PROCEDURE — 94640 AIRWAY INHALATION TREATMENT: CPT

## 2023-07-26 PROCEDURE — 94760 N-INVAS EAR/PLS OXIMETRY 1: CPT

## 2023-07-26 PROCEDURE — 36415 COLL VENOUS BLD VENIPUNCTURE: CPT | Performed by: FAMILY MEDICINE

## 2023-07-26 PROCEDURE — 93010 EKG 12-LEAD: ICD-10-PCS | Mod: ,,, | Performed by: SPECIALIST

## 2023-07-26 PROCEDURE — 27000221 HC OXYGEN, UP TO 24 HOURS

## 2023-07-26 PROCEDURE — 99900035 HC TECH TIME PER 15 MIN (STAT)

## 2023-07-26 PROCEDURE — 84132 ASSAY OF SERUM POTASSIUM: CPT

## 2023-07-26 PROCEDURE — 99291 CRITICAL CARE FIRST HOUR: CPT | Mod: ,,, | Performed by: INTERNAL MEDICINE

## 2023-07-26 PROCEDURE — 85014 HEMATOCRIT: CPT

## 2023-07-26 PROCEDURE — 25000242 PHARM REV CODE 250 ALT 637 W/ HCPCS: Performed by: INTERNAL MEDICINE

## 2023-07-26 PROCEDURE — 36600 WITHDRAWAL OF ARTERIAL BLOOD: CPT

## 2023-07-26 PROCEDURE — 20000000 HC ICU ROOM

## 2023-07-26 PROCEDURE — 80053 COMPREHEN METABOLIC PANEL: CPT | Performed by: FAMILY MEDICINE

## 2023-07-26 PROCEDURE — 99291 PR CRITICAL CARE, E/M 30-74 MINUTES: ICD-10-PCS | Mod: ,,, | Performed by: INTERNAL MEDICINE

## 2023-07-26 PROCEDURE — 25000003 PHARM REV CODE 250: Performed by: INTERNAL MEDICINE

## 2023-07-26 PROCEDURE — 25000003 PHARM REV CODE 250: Performed by: STUDENT IN AN ORGANIZED HEALTH CARE EDUCATION/TRAINING PROGRAM

## 2023-07-26 PROCEDURE — 25000003 PHARM REV CODE 250: Performed by: FAMILY MEDICINE

## 2023-07-26 PROCEDURE — 94761 N-INVAS EAR/PLS OXIMETRY MLT: CPT

## 2023-07-26 PROCEDURE — 63600175 PHARM REV CODE 636 W HCPCS: Performed by: FAMILY MEDICINE

## 2023-07-26 PROCEDURE — 63600175 PHARM REV CODE 636 W HCPCS: Performed by: INTERNAL MEDICINE

## 2023-07-26 PROCEDURE — 63600175 PHARM REV CODE 636 W HCPCS: Mod: JZ,JG | Performed by: STUDENT IN AN ORGANIZED HEALTH CARE EDUCATION/TRAINING PROGRAM

## 2023-07-26 PROCEDURE — 93010 ELECTROCARDIOGRAM REPORT: CPT | Mod: ,,, | Performed by: SPECIALIST

## 2023-07-26 PROCEDURE — 85025 COMPLETE CBC W/AUTO DIFF WBC: CPT | Performed by: FAMILY MEDICINE

## 2023-07-26 PROCEDURE — 84100 ASSAY OF PHOSPHORUS: CPT | Performed by: FAMILY MEDICINE

## 2023-07-26 PROCEDURE — 99900031 HC PATIENT EDUCATION (STAT)

## 2023-07-26 PROCEDURE — 99233 PR SUBSEQUENT HOSPITAL CARE,LEVL III: ICD-10-PCS | Mod: ,,, | Performed by: STUDENT IN AN ORGANIZED HEALTH CARE EDUCATION/TRAINING PROGRAM

## 2023-07-26 PROCEDURE — 83735 ASSAY OF MAGNESIUM: CPT | Performed by: FAMILY MEDICINE

## 2023-07-26 PROCEDURE — 82803 BLOOD GASES ANY COMBINATION: CPT

## 2023-07-26 PROCEDURE — 84295 ASSAY OF SERUM SODIUM: CPT

## 2023-07-26 PROCEDURE — 82330 ASSAY OF CALCIUM: CPT

## 2023-07-26 PROCEDURE — 99900026 HC AIRWAY MAINTENANCE (STAT)

## 2023-07-26 PROCEDURE — 94003 VENT MGMT INPAT SUBQ DAY: CPT

## 2023-07-26 PROCEDURE — 93005 ELECTROCARDIOGRAM TRACING: CPT | Performed by: SPECIALIST

## 2023-07-26 RX ADMIN — MEROPENEM 1 G: 1 INJECTION, POWDER, FOR SOLUTION INTRAVENOUS at 05:07

## 2023-07-26 RX ADMIN — CARBOXYMETHYLCELLULOSE SODIUM 1 DROP: 5 SOLUTION/ DROPS OPHTHALMIC at 09:07

## 2023-07-26 RX ADMIN — MUPIROCIN 1 G: 20 OINTMENT TOPICAL at 09:07

## 2023-07-26 RX ADMIN — ACETYLCYSTEINE 2 ML: 200 SOLUTION ORAL; RESPIRATORY (INHALATION) at 07:07

## 2023-07-26 RX ADMIN — CARBOXYMETHYLCELLULOSE SODIUM 1 DROP: 5 SOLUTION/ DROPS OPHTHALMIC at 04:07

## 2023-07-26 RX ADMIN — MIDODRINE HYDROCHLORIDE 10 MG: 2.5 TABLET ORAL at 12:07

## 2023-07-26 RX ADMIN — IPRATROPIUM BROMIDE AND ALBUTEROL SULFATE 3 ML: 2.5; .5 SOLUTION RESPIRATORY (INHALATION) at 07:07

## 2023-07-26 RX ADMIN — SODIUM CHLORIDE: 0.9 INJECTION, SOLUTION INTRAVENOUS at 05:07

## 2023-07-26 RX ADMIN — DEXTROSE MONOHYDRATE 2.5 G: 50 INJECTION, SOLUTION INTRAVENOUS at 09:07

## 2023-07-26 RX ADMIN — MICONAZOLE NITRATE: 20 CREAM TOPICAL at 09:07

## 2023-07-26 RX ADMIN — Medication 4000 UNITS: at 12:07

## 2023-07-26 RX ADMIN — CARBOXYMETHYLCELLULOSE SODIUM 1 DROP: 5 SOLUTION/ DROPS OPHTHALMIC at 12:07

## 2023-07-26 RX ADMIN — ENOXAPARIN SODIUM 40 MG: 100 INJECTION SUBCUTANEOUS at 04:07

## 2023-07-26 RX ADMIN — DEXTROSE MONOHYDRATE 2.5 G: 50 INJECTION, SOLUTION INTRAVENOUS at 04:07

## 2023-07-26 RX ADMIN — IPRATROPIUM BROMIDE AND ALBUTEROL SULFATE 3 ML: 2.5; .5 SOLUTION RESPIRATORY (INHALATION) at 01:07

## 2023-07-26 RX ADMIN — MIDODRINE HYDROCHLORIDE 10 MG: 2.5 TABLET ORAL at 04:07

## 2023-07-26 RX ADMIN — MAGNESIUM SULFATE HEPTAHYDRATE 2 G: 40 INJECTION, SOLUTION INTRAVENOUS at 05:07

## 2023-07-26 RX ADMIN — ACETYLCYSTEINE 2 ML: 200 SOLUTION ORAL; RESPIRATORY (INHALATION) at 01:07

## 2023-07-26 NOTE — CARE UPDATE
07/26/23 0737   Patient Assessment/Suction   Level of Consciousness (AVPU) responds to pain   Respiratory Effort Moderate;Labored   Expansion/Accessory Muscles/Retractions no use of accessory muscles   All Lung Fields Breath Sounds coarse;diminished   Rhythm/Pattern, Respiratory assisted mechanically   Cough Frequency with stimulation   Cough Type assisted   Suction Method oral;tracheal   Suction Pressure (mmHg) -120 mmHg   $ Suction Charges Inline Suction Procedure Stat Charge   Secretions Amount large   Secretions Color creamy;yellow   Secretions Characteristics tenacious;thick   Skin Integrity   $ Wound Care Tech Time 15 min   Area Observed Neck;Neck under tracheostomy   Skin Appearance without discoloration   PRE-TX-O2   Device (Oxygen Therapy) ventilator   $ Is the patient on Low Flow Oxygen? Yes   SpO2 98 %   Pulse Oximetry Type Continuous   $ Pulse Oximetry - Multiple Charge Pulse Oximetry - Multiple   Pulse (!) 117   Resp (!) 25   Aerosol Therapy   $ Aerosol Therapy Charges Aerosol Treatment   Daily Review of Necessity (SVN) completed   Respiratory Treatment Status (SVN) given   Treatment Route (SVN) in-line;ventilator   Patient Position (SVN) semi-Le's   Post Treatment Assessment (SVN) breath sounds unchanged   Signs of Intolerance (SVN) none   Breath Sounds Post-Respiratory Treatment   Throughout All Fields Post-Treatment All Fields   Throughout All Fields Post-Treatment no change   Post-treatment Heart Rate (beats/min) 117   Post-treatment Resp Rate (breaths/min) 25   Adult Surgical Airway Shiley Cuffed 6.0/ 75mm   No placement date or time found.   Present Prior to Hospital Arrival?: Yes  Type: Tracheostomy  Brand: Shiley  Airway Device Style: Cuffed  Airway Device Size: 6.0/ 75mm   Cuff Pressure   (mlt)   Cuff Inflation? Inflated   Speaking Valve Usage Not wearing   Status Secured   Site Assessment Clean;Dry;No bleeding;No drainage   Ties Assessment Clean;Dry;Intact;Secure;Not needed   Airway  Safety   Is Ambu Bag and Mask with Patient? Yes, Adult Ambu Bag and Mask   Trach Supplies at Bedside Inner Cannula;Suction Catheter;Ambu Bag and Mask (in patients room);Obturator;10cc Syringes   Suction set is at the bedside? Yes   Extra trach at bedside? Yes   Extra trach sizes at bedside? 4;6   Is Obturator Available? Yes   Location of Obturator?  Bedside table   Vent Select   Conventional Vent Y   $ Ventilator Subsequent 1   Charged w/in last 24h YES   Preset Conventional Ventilator Settings   Vent ID [id number]   Vent Type    Humidity HME   Conventional Ventilator Alarms   Alarms On Y   Education   $ Education Bronchodilator;Ventilator Oxygen;Suction;30 min   Blood Gas Puncture   Blood Gas Type arterial   Arterial Site left;radial artery   Collateral Circulation Verified Mark's Test   Site Preparation alcohol   Pressure Held yes   Distal Pulse Present yes   Hematoma Present no   Sample Obtained/Sent to Lab yes   Oxygen Amount FiO2 (specify)  (70%/10PEEP)   Number of Attempts for ABG? 1   Attempted By? D NECAISE, RT   Labs   $ Was an ABG obtained? Arterial Puncture;ISTAT - Blood gas;ISTAT - Calcium;ISTAT - Hematocrit;ISTAT - Potassium;ISTAT - Sodium   $ Labs Tech Time 15 min

## 2023-07-26 NOTE — ASSESSMENT & PLAN NOTE
Body mass index is 38.32 kg/m². Morbid obesity complicates all aspects of disease management from diagnostic modalities to treatment.

## 2023-07-26 NOTE — PROGRESS NOTES
"[facility]  Progress Note    Patient Name: Genna Felix  MRN: [id number]  Patient Class: IP- Inpatient   Admission Date: 7/22/2023  Length of Stay: 4 days  Attending Physician: Kirill Garcia MD  Primary Care Provider: Primary Doctor No        Subjective:     Principal Problem:Acute on chronic respiratory failure        HPI:  Patient presents to ER due to respiratory distress.     Spoke with sister, Amelia. States that she was told her oxygen was dropping low at Ms. Felix's nursing home, [place]. Was transferred to the ER. Called [facility] for more info and spoke with Ms. Yousif (a nurse who got sign out from the day team about patient). Was told that Respiratory was trying to improve her sats throughout the day however it never improved above 87%. Patient ws then transferred to ER. Staff noticed urine output was dirty with sediment so u/a and urine culture ordered.      Overview/Hospital Course:  Genna Felix is a 62 year old female with a past medical history of anoxic brain injury s/p G tube and tracheostomy, PE, GERD, HTN, hepatic steatosis, obesity, and anemia who presented with severe sepsis with acute respiratory failure requiring mechanical ventilation. Respiratory cultures show  Pseudomonas. ID has been consulted and started Avycaz. Pulmonary has also been consulted for ventilator management.      Interval History: see "Hospital Course"    Review of Systems   Unable to perform ROS: Patient nonverbal   Objective:     Vital Signs (Most Recent):  Temp: 98.4 °F (36.9 °C) (07/26/23 1501)  Pulse: 101 (07/26/23 1501)  Resp: (!) 26 (07/26/23 1501)  BP: 124/66 (07/26/23 1501)  SpO2: 97 % (07/26/23 1501) Vital Signs (24h Range):  Temp:  [97.7 °F (36.5 °C)-98.4 °F (36.9 °C)] 98.4 °F (36.9 °C)  Pulse:  [] 101  Resp:  [11-55] 26  SpO2:  [89 %-100 %] 97 %  BP: ()/(55-91) 124/66     Weight: 89 kg (196 lb 3.4 oz)  Body mass index is 38.32 kg/m².    Intake/Output " Summary (Last 24 hours) at 7/26/2023 1726  Last data filed at 7/26/2023 1655  Gross per 24 hour   Intake 4460.42 ml   Output 3800 ml   Net 660.42 ml         Physical Exam  Vitals and nursing note reviewed.   Constitutional:       General: She is not in acute distress.     Appearance: She is ill-appearing.   HENT:      Head: Normocephalic and atraumatic.      Right Ear: External ear normal.      Left Ear: External ear normal.      Nose: Nose normal.      Mouth/Throat:      Mouth: Mucous membranes are moist.      Pharynx: Oropharynx is clear.   Eyes:      Extraocular Movements: Extraocular movements intact.      Conjunctiva/sclera: Conjunctivae normal.   Neck:      Comments: Tracheostomy.  Cardiovascular:      Rate and Rhythm: Regular rhythm. Tachycardia present.      Pulses: Normal pulses.      Heart sounds: Normal heart sounds.   Pulmonary:      Breath sounds: Normal breath sounds.      Comments: MV.  Abdominal:      General: Bowel sounds are normal.      Palpations: Abdomen is soft.      Tenderness: There is no abdominal tenderness.      Comments: G tube.   Genitourinary:     Comments: Hamilton.  Musculoskeletal:         General: Normal range of motion.   Skin:     General: Skin is warm and dry.   Neurological:      Mental Status: Mental status is at baseline.           Significant Labs: All pertinent labs within the past 24 hours have been reviewed.    Significant Imaging: I have reviewed all pertinent imaging results/findings within the past 24 hours.      Assessment/Plan:      * Acute on chronic respiratory failure  -Continue Avycaz  -CXR and ABG PRN  -Pulmonary consulted    Hepatic steatosis  Chronic. Stable.  -Trend LFTs      Obesity (BMI 30-39.9)  Body mass index is 38.32 kg/m². Morbid obesity complicates all aspects of disease management from diagnostic modalities to treatment.         Pressure injury of buttock, stage 2  -Wound Care consulted      Pneumonia of both lungs due to infectious organism  -ID  consulted  -Continue Avycaz      Difficult Hamilton catheter placement        Essential hypertension  -Continue to monitor      PEG (percutaneous endoscopic gastrostomy) status  -Care per Nursing      Tracheostomy dependence  -Care per RT      Persistent vegetative state  -Turn patient q2 hours  -Shower patient daily  -Consult Nutrition for diet recs    Anemia of chronic disease  -Trend Hgb with CBC        VTE Risk Mitigation (From admission, onward)         Ordered     enoxaparin injection 40 mg  Daily         07/23/23 0225     IP VTE HIGH RISK PATIENT  Once         07/23/23 0225     Place sequential compression device  Until discontinued         07/23/23 0225                Discharge Planning   [place]: 7/28/2023     Code Status: Full Code   Is the patient medically ready for discharge?:     Reason for patient still in hospital (select all that apply): Patient trending condition, Laboratory test, Treatment and Consult recommendations  Discharge Plan A: Return to nursing home                  Kirill Garcia MD  Department of Hospital Medicine   [facility]

## 2023-07-26 NOTE — SUBJECTIVE & OBJECTIVE
"Interval History: see "Hospital Course"    Review of Systems   Unable to perform ROS: Patient nonverbal   Objective:     Vital Signs (Most Recent):  Temp: 98.4 °F (36.9 °C) (07/26/23 1501)  Pulse: 101 (07/26/23 1501)  Resp: (!) 26 (07/26/23 1501)  BP: 124/66 (07/26/23 1501)  SpO2: 97 % (07/26/23 1501) Vital Signs (24h Range):  Temp:  [97.7 °F (36.5 °C)-98.4 °F (36.9 °C)] 98.4 °F (36.9 °C)  Pulse:  [] 101  Resp:  [11-55] 26  SpO2:  [89 %-100 %] 97 %  BP: ()/(55-91) 124/66     Weight: 89 kg (196 lb 3.4 oz)  Body mass index is 38.32 kg/m².    Intake/Output Summary (Last 24 hours) at 7/26/2023 1726  Last data filed at 7/26/2023 1655  Gross per 24 hour   Intake 4460.42 ml   Output 3800 ml   Net 660.42 ml         Physical Exam  Vitals and nursing note reviewed.   Constitutional:       General: She is not in acute distress.     Appearance: She is ill-appearing.   HENT:      Head: Normocephalic and atraumatic.      Right Ear: External ear normal.      Left Ear: External ear normal.      Nose: Nose normal.      Mouth/Throat:      Mouth: Mucous membranes are moist.      Pharynx: Oropharynx is clear.   Eyes:      Extraocular Movements: Extraocular movements intact.      Conjunctiva/sclera: Conjunctivae normal.   Neck:      Comments: Tracheostomy.  Cardiovascular:      Rate and Rhythm: Regular rhythm. Tachycardia present.      Pulses: Normal pulses.      Heart sounds: Normal heart sounds.   Pulmonary:      Breath sounds: Normal breath sounds.      Comments: MV.  Abdominal:      General: Bowel sounds are normal.      Palpations: Abdomen is soft.      Tenderness: There is no abdominal tenderness.      Comments: G tube.   Genitourinary:     Comments: Hamilton.  Musculoskeletal:         General: Normal range of motion.   Skin:     General: Skin is warm and dry.   Neurological:      Mental Status: Mental status is at baseline.           Significant Labs: All pertinent labs within the past 24 hours have been " reviewed.    Significant Imaging: I have reviewed all pertinent imaging results/findings within the past 24 hours.

## 2023-07-26 NOTE — PROGRESS NOTES
Progress Note  Infectious Disease    Reason for Consult:  Hypothermia, sepsis    HPI: Genna Felix is a 62 y.o. female resident of [facility], known to ID service from prior visits, w PMHx of MAICO 2020, vegetative state, vent dependent, tracheostomy, PEG, PE, HTN, GERD, anemia, osteopenia, acute cholecystitis status post IR drainage in January '22, right arm infection in August '22, MDRs infections like Pseudomonas , Acinetobacter , MRSA, Proteus mirabilis ESBL etc.    She has multiple hospitalizations.   Most recent hospitalization was in the beginning of July.  She had x2 Proteus mirabilis ESBL in urine, Serratia marcescens in sputum, and blood cultures had coag-negative staph on 07/06/2023.  Patient completed treatment with meropenem.    This time, patient is sent to ED because of decreased oxygenation, pulse ox around 87%.  CTA ruled out PE, but found interstitial opacities septal thickening and consolidation of right lower lobe.    She is hypothermic, which is her usual sign of sepsis/infection, needing a Beth Hugger.  She is admitted in ICU, intubated through tracheostomy, FiO2 of 70%, receiving vancomycin and meropenem.  Secretions are pale and yellow.  Sputum cultures are not sent yet, I just did.  Procalcitonin normal at 0.13.  ,  which is slightly higher than usual, blood cultures NGTD    7/24 ([place]): Interim reviewed, discussed with Dr Mccormack, patient seen and examined at bedside, remains trach to vent, FiO2 70%, white thick secretions. Micro reviewed, MRSA nasal negative, blood cultures x 2 no growth to date pending final. Respiratory culture to be collected. UA yesterday with pyuria, pending culture.     7/25: Interim reviewed, patient seen and examined at bedside, remain on FiO2 70%. Hemodynamically stable, afebrile, Labs reviewed,  WBC stable, no left shift, H/H 7.8/26.3, plt: 193. Creatinine 0.4, mild transaminits. Vanco levels supratherapeutic, discontinued. Micro reviewed,  respiratory culture with presumptive Pseudomonas, pending final.     7/26: interim reviewed, patient seen and examined at bedside, remains on FiO2 70%, looks in mild respiratory distress. Micro updated, /MDRO Pseudomonas aeruginosa, resistant to Zosyn as well (SOPHIA >16). Tachycardic, afebrile.     Antibiotics (From admission, onward)      Start     Stop Route Frequency Ordered    07/26/23 0930  cefTAZidime-avibactam (AVYCAZ) 2.5 g in dextrose 5 % (D5W) 100 mL         -- IV Every 8 hours (non-standard times) 07/26/23 0817    07/23/23 0900  mupirocin 2 % ointment         07/28 0859 Nasl 2 times daily 07/23/23 0032          Antifungals (From admission, onward)      Start     Stop Route Frequency Ordered    07/23/23 0900  miconazole 2 % cream         -- Top 2 times daily 07/23/23 0227            Review of patient's allergies indicates:  No Known Allergies  Past Medical History:   Diagnosis Date    Anoxic brain damage 07/2020    Bedbound 07/2020    Cardiac arrest 07/2020    Cirrhosis     GERD (gastroesophageal reflux disease)     Hemangioma of intra-abdominal structure     Hypertension     Nursing home resident     Protein calorie malnutrition     Pulmonary embolus     Respiratory distress     S/P percutaneous endoscopic gastrostomy (PEG) tube placement 07/2020    Total self-care deficit 07/2020    Tracheostomy dependence     7/2020     Past Surgical History:   Procedure Laterality Date    PEG W/TRACHEOSTOMY PLACEMENT  07/27/2020    SKIN GRAFT      buttocks     Social History     Socioeconomic History    Marital status:    Tobacco Use    Smoking status: Never    Smokeless tobacco: Never   Substance and Sexual Activity    Alcohol use: Not Currently    Drug use: Not Currently    Sexual activity: Not Currently     Family History   Problem Relation Age of Onset    Hypertension Mother     No Known Problems Father          EXAM & DIAGNOSTICS REVIEWED:   Vitals:     Temp:  [97.7 °F (36.5 °C)-98.4 °F (36.9 °C)]   Temp:  98 °F (36.7 °C) (07/26/23 0530)  Pulse: (!) 117 (07/26/23 0737)  Resp: (!) 25 (07/26/23 0737)  BP: (!) 143/87 (07/26/23 0600)  SpO2: 98 % (07/26/23 0737)    Intake/Output Summary (Last 24 hours) at 7/26/2023 0856  Last data filed at 7/26/2023 0618  Gross per 24 hour   Intake 3565.12 ml   Output 2500 ml   Net 1065.12 ml     Vent Mode: A/C  Oxygen Concentration (%):  [70] 70  Resp Rate Total:  [25 br/min-38 br/min] 25 br/min  Vt Set:  [360 mL] 360 mL  PEEP/CPAP:  [5 cmH20-10 cmH20] 10 cmH20  Pressure Support:  [0 cmH20] 0 cmH20  Mean Airway Pressure:  [9.7 vzW87-68 cmH20] 13 cmH20     General:  Frail elderly lady, laying in bed, in mild respiratory distress, trach to vent FiO2 70%   Eyes:  Anicteric, L eye with hyperemia   ENT:  Dry lips, thick oral secretions suctioned    Neck:  Supple  Tracheostomy to vent.  Lungs: R base rhonchi  Heart:  S1/S2+, regular rhythm   Abd:  Slightly distended, hypoactive bowel sounds, no pain, PEG in place  :  Hamilton, urine yellow  Musc:  Joints without effusion, swelling, erythema, synovitis; positive for muscle wasting.   Skin:  See pictures.  Positive for dry skin throughout.  No palmar or plantar lesions. No subungual petechiae  Neuro:        Gaze  towards the right upper lung field  Psych    Lymphatic:       Extrem: No edema, erythema, phlebitis, cellulitis, warm and well perfused  VAD:  Peripheral IV     Midline L 7/7     Isolation:  Contact isolation/[place] resident  Wound:         Lines/Tubes/Drains:    General Labs reviewed:  Recent Labs   Lab 07/24/23 0335 07/25/23 0324 07/26/23  0332 07/26/23  0732   WBC 4.56 8.71 10.80  --    HGB 7.6* 7.8* 9.5*  --    HCT 26.0* 26.3* 33.9* 32*    193 219  --        Recent Labs   Lab 07/24/23  0335 07/25/23  0324 07/26/23  0332    143 140   K 4.1 3.6 4.0   * 113* 109   CO2 27 24 24   BUN 24* 21* 14   CREATININE 0.4* 0.4* 0.4*   CALCIUM 9.1 8.6* 9.1   PROT 8.6* 8.6* 9.3*   BILITOT 1.2* 1.5* 1.9*   ALKPHOS 141* 148* 172*    ALT 29 47* 57*   AST 23 47* 48*     No results for input(s): CRP in the last 168 hours.    Estimated Creatinine Clearance: 148.5 mL/min (A) (based on SCr of 0.4 mg/dL (L)).      Micro:  Microbiology Results (last 7 days)       Procedure Component Value Units Date/Time    Culture, Respiratory with Gram Stain [[id number]]  (Abnormal)  (Susceptibility) Collected: 07/24/23 1148    Order Status: Completed Specimen: Respiratory from Sputum Updated: 07/26/23 0831     Respiratory Culture PSEUDOMONAS AERUGINOSA   Many  MDRO  Isolate sent out for reference testing       Gram Stain (Respiratory) <10 epithelial cells per low power field.     Gram Stain (Respiratory) Many WBC's     Gram Stain (Respiratory) Moderate Gram negative rods    Urine culture [[id number]] Collected: 07/23/23 1602    Order Status: Completed Specimen: Urine Updated: 07/26/23 0703     Urine Culture, Routine No growth    Narrative:      Specimen Source->Urine    Blood culture x two cultures. Draw prior to antibiotics. [[id number]] Collected: 07/22/23 2044    Order Status: Completed Specimen: Blood Updated: 07/25/23 2232     Blood Culture, Routine No Growth to date      No Growth to date      No Growth to date      No Growth to date    Narrative:      Aerobic and anaerobic  Collection has been rescheduled by 2MB at 07/22/2023 19:43 Reason: Amairani liz said she will call when she wants cultures drawn  Collection has been rescheduled by 2MB at 07/22/2023 19:43 Reason: Amairani liz said she will call when she wants cultures drawn    Blood culture x two cultures. Draw prior to antibiotics. [[id number]] Collected: 07/22/23 2050    Order Status: Completed Specimen: Blood Updated: 07/25/23 2232     Blood Culture, Routine No Growth to date      No Growth to date      No Growth to date      No Growth to date    Narrative:      Aerobic and anaerobic  Collection has been rescheduled by 2MB at 07/22/2023 19:43 Reason: Amairani liz said she will call when she  wants cultures drawn  Collection has been rescheduled by 2MB at 07/22/2023 19:43 Reason: Rn   hamilton liz said she will call when she wants cultures drawn    MRSA Screen by PCR [[id number]] Collected: 07/23/23 1610    Order Status: Completed Specimen: Nasopharyngeal Swab from Nasal Updated: 07/23/23 1750     MRSA SCREEN BY PCR Negative             Specimen: Respiratory from Sputum Updated: 07/26/23 0831    Respiratory Culture PSEUDOMONAS AERUGINOSA    Many   MDRO   Isolate sent out for reference testing    Abnormal     Gram Stain (Respiratory) <10 epithelial cells per low power field.    Gram Stain (Respiratory) Many WBC's    Gram Stain (Respiratory) Moderate Gram negative rods   Susceptibility     PSEUDOMONAS AERUGINOSA      CULTURE, RESPIRATORY (Preliminary)     Cefepime 16 mcg/mL Intermediate     Ciprofloxacin >2 mcg/mL Resistant     Gentamicin >8 mcg/mL Resistant     Levofloxacin >4 mcg/mL Resistant     Meropenem >8 mcg/mL Resistant     Piperacillin/Tazo 64 mcg/mL Sensitive     Tobramycin >8 mcg/mL Resistant              Imaging Reviewed:  CXR latest: Improvement of the patchy alveolar opacities in the right lung with stable mild increased interstitial markings suggestive of resolving edema or pneumonia.    Chest x-ray 07/22/2023.      Slight increasing infiltrates in the lungs.    CT 07/22/2023.     1.  No acute aortic abnormality or pulmonary embolism.  2.  Bilateral interstitial opacities with interlobular septal thickening and consolidation in the right lower lobe.  Imaging features can be seen with COVID pneumonia, though are nonspecific and can occur with a variety of infectious and noninfectious processes. PneInd.  3.  Mediastinal and bihilar lymphadenopathy.  4.  Small bilateral pleural effusions.  5.  No acute abdominal or pelvic abnormality.  6.  Splenomegaly.  7.  Hepatic steatosis.      Cardiology:    IMPRESSION & PLAN     Sepsis + hypothermia resolved, secondary to VAP, FiO2 70%  Respiratory  culture Pseudomonas,  - Zosyn resistant as well, SOPHIA >16    MRSA nasal swab not detected   Urine culture no growth to date, pending final   Blood cultures x 2 no growth to date pending final     2. Resident of [facility], known to ID service from prior visits, w/ PMHx of MAICO 2020, vegetative state, vent dependent, tracheostomy, PEG, PE, HTN, GERD, anemia, osteopenia, acute cholecystitis status post IR drainage in January '22, right arm infection in August '22, MDRs infections like Pseudomonas , Acinetobacter , MRSA, Proteus mirabilis ESBL etc.Splenomegaly, hepato steatosis,      Recommendations:  Stop Merrem  Avycaz 2.5g IV q8h   Sensitivities for Avycaz and Zerbaxa requested   Monitor O2 sats  Trend LFTs  Aspiration precautions     D/w ICU nursing, Dr Ritchie      Medical Decision Making during this encounter was  [_] Low Complexity  [_] Moderate Complexity  [ xxx ] High Complexity

## 2023-07-26 NOTE — PLAN OF CARE
Problem: Infection  Goal: Absence of Infection Signs and Symptoms  Outcome: Ongoing, Progressing     Problem: Adult Inpatient Plan of Care  Goal: Plan of Care Review  Outcome: Ongoing, Progressing     Problem: Infection (Pneumonia)  Goal: Resolution of Infection Signs and Symptoms  Outcome: Ongoing, Progressing     Problem: Impaired Wound Healing  Goal: Optimal Wound Healing  Outcome: Ongoing, Progressing     Problem: Skin Injury Risk Increased  Goal: Skin Health and Integrity  Outcome: Ongoing, Progressing     Problem: Inability to Wean (Mechanical Ventilation, Invasive)  Goal: Mechanical Ventilation Liberation  Outcome: Ongoing, Progressing

## 2023-07-26 NOTE — PROGRESS NOTES
07/26/2023      Admit Date: 7/22/2023  Genna Felix  Pulmonary and Critical Care Inpatient Progress Note    Chief Complaint   Patient presents with    Shortness of Breath     [facility] [place] [facility] that started earlier today       History of Present Illness:  Pt is a 63 yo female with hepatic steatosis, PE, GERD, anoxic brain injury dependent on trach/vent and PEG who presented to the ED with desaturation. She was found to have pneumonia and UTI. Pulmonary is consulted for vent management. She is hypothermic requiring warming blanket today. Hx is gathered from chart review. There is no family at bedside.    7/24/23: Minimal urine output. Unable to place Hamilton. Obtaining bladder scan -- unable to quantify, but full of fluid. Re-attempting Hamilton.    7/25/23: Stable.    7/26/23: Respiratory acidosis this morning and patient noted to have some increased respiratory effort. There was an audible leak around the tracheostomy tube, which resolved when I more fully inflated the cuff.      PFSH:  Past Medical History:   Diagnosis Date    Anoxic brain damage 07/2020    Bedbound 07/2020    Cardiac arrest 07/2020    Cirrhosis     GERD (gastroesophageal reflux disease)     Hemangioma of intra-abdominal structure     Hypertension     Nursing home resident     Protein calorie malnutrition     Pulmonary embolus     Respiratory distress     S/P percutaneous endoscopic gastrostomy (PEG) tube placement 07/2020    Total self-care deficit 07/2020    Tracheostomy dependence     7/2020     Past Surgical History:   Procedure Laterality Date    PEG W/TRACHEOSTOMY PLACEMENT  07/27/2020    SKIN GRAFT      buttocks     Social History     Tobacco Use    Smoking status: Never    Smokeless tobacco: Never   Substance Use Topics    Alcohol use: Not Currently    Drug use: Not Currently     Family History   Problem Relation Age of Onset    Hypertension Mother     No Known Problems Father      Review of patient's allergies  indicates:  No Known Allergies    Performance Status:Performance Status:The patient's activity level is bedbound.    Review of Systems:  due to neurologic status/impairments a     Exam:Comprehensive exam done. /78   Pulse (!) 111   Temp 97.9 °F (36.6 °C) (Axillary)   Resp (!) 27   Ht 5' (1.524 m)   Wt 89 kg (196 lb 3.4 oz)   SpO2 98%   Breastfeeding No   BMI 38.32 kg/m²       PHYSICAL EXAM  GENERAL:  NAD.  HEENT: EOMI. PERRLA. Opens eyes spontaneously, but no response to confrontation.  NECK: No cervical adenopathy palpated. No JVD or HJR.  HEART: Regular rate and rhythm. No murmur or gallop auscultated.  LUNGS: No crackles or wheezing on anterior auscultation. Audible cuff leak.  ABDOMEN: Bowel sounds present. Soft, non-tender, non-distended.  EXTREMITIES: Normal muscle tone and joint movement, no cyanosis or clubbing.   LYMPHATICS: No adenopathy palpated, no edema.  SKIN: Dry, intact, no lesions.   NEURO: Unresponsive.  PSYCH: Unable to assess.    Imaging Results              CTA Chest Abdomen Pelvis (Final result)  Result time 07/22/23 22:24:45      Final result by Danny Rasmussen DO (07/22/23 22:24:45)                   Narrative:    PROCEDURE:  CT Angiography Chest, Abdomen and Pelvis With Intravenous Contrast    CLINICAL INDICATION:  The patient is 62 years old and is Female; respiratory distress    TECHNIQUE:  Axial computed tomographic angiography images of the chest, abdomen and pelvis with intravenous contrast.  This CT exam was performed using one or more of the following dose reduction techniques:  automated exposure control, adjustment of the mA and/or kV according to patient size, and/or use of iterative reconstruction technique.  MIP reconstructed images were created and reviewed.    DLP: 955 mGycm    CONTRAST:  100mL of Omnipaque 350 was administered intravenously.    COMPARISON:  Chest radiograph of the same day and CT abdomen and pelvis dated  7/6/2023.    FINDINGS:    VASCULATURE:  AORTA:  No acute findings.  No aortic aneurysm.  No dissection.  PULMONARY ARTERIES:  Unremarkable as visualized.  No pulmonary embolism is identified.  GREAT VESSELS OF AORTIC ARCH:  No acute findings.  No dissection.  No arterial occlusion or significant stenosis.  CELIAC TRUNK AND MESENTERIC ARTERIES:  No acute findings.  No occlusion or significant stenosis.  RENAL ARTERIES:  No acute findings.  No occlusion or significant stenosis.  ILIAC ARTERIES:  No acute findings.  No occlusion or significant stenosis.    CHEST:  LUNGS:  Bilateral interstitial opacities with interlobular septal thickening and consolidation in the right lower lobe.  PLEURAL SPACE:  Small bilateral pleural effusions.  No pneumothorax.  HEART:  Unremarkable.  No cardiomegaly.  No significant pericardial effusion.  MEDIASTINUM:  Mediastinal and bihilar lymphadenopathy.    ABDOMEN:  LIVER:  Hepatic steatosis.  GALLBLADDER AND BILE DUCTS:  Unremarkable.  No calcified stones.  No ductal dilation.  PANCREAS:  Unremarkable.  No ductal dilation.  No mass.  SPLEEN:  Splenomegaly.  ADRENALS:  Unremarkable.  No mass.  KIDNEYS AND URETERS:  Unremarkable.  No hydronephrosis.  No solid mass.  STOMACH AND BOWEL:  Unremarkable.  No obstruction.  No mucosal thickening.    PELVIS:  APPENDIX:  No findings to suggest acute appendicitis.  BLADDER:  Bladder is decompressed.  REPRODUCTIVE:  Prior hysterectomy.    CHEST, ABDOMEN and PELVIS:  INTRAPERITONEAL SPACE:  Unremarkable.  No significant fluid collection.  No free air.  BONES/JOINTS:  Severe heterotopic bone formation arising from the left acetabulum. Milder similar-appearing finding on the right. Advanced left shoulder degenerative changes.  Diffuse osteopenia. Multilevel degenerative changes.  No acute fracture.  No dislocation.  SOFT TISSUES:  Fat-containing umbilical hernia.  LYMPH NODES:  Unremarkable.  No enlarged lymph nodes.  TUBES, LINES AND DEVICES:  Partially  evaluated gastrostomy and tracheostomy tubes.    IMPRESSION:    1.  No acute aortic abnormality or pulmonary embolism.    2.  Bilateral interstitial opacities with interlobular septal thickening and consolidation in the right lower lobe.  Imaging features can be seen with COVID pneumonia, though are nonspecific and can occur with a variety of infectious and noninfectious processes. PneInd.    3.  Mediastinal and bihilar lymphadenopathy.    4.  Small bilateral pleural effusions.    5.  No acute abdominal or pelvic abnormality.    6.  Splenomegaly.    7.  Hepatic steatosis.    Electronically signed by:  Danny Rasmussen DO  7/22/2023 10:24 PM CDT Workstation: [id number]                                     X-Ray Chest AP Portable (Final result)  Result time 07/22/23 19:49:01      Final result by Jayant Junior MD (07/22/23 19:49:01)                   Narrative:    CLINICAL DATA:  Sepsis and shortness of breath    TECHNIQUE:  Views: A single AP view.  Limitations: The images are technically satisfactory.    COMPARISON:  July 8, 2023    FINDINGS:  CARDIOVASCULAR: Normal.    LUNGS AND PLEURA:  1. Infiltrative changes are seen diffusely throughout both lungs. Allowing for technical differences these have increased slightly since last examination.    MEDIASTINAL AND HILAR STRUCTURES: Normal.    OSSEOUS STRUCTURES: Normal.    ADDITIONAL FINDINGS:  1. The tracheostomy tube remains in good position.    IMPRESSION:    1.  Slight increasing infiltrates in the lungs.    This document was created using a voice recognition transcribing system. Incorrect words or phrases may have been missed during proof reading. Please interpret accordingly or contact the radiologist for clarification if necessary.    Electronically signed by:  Jayant Junior MD  7/22/2023 7:49 PM CDT Workstation: [id number]                                        Labs     Recent Labs   Lab 07/26/23  0332 07/26/23  0732   WBC 10.80  --    HGB 9.5*  --     HCT 33.9* 32*     --      Recent Labs   Lab 07/26/23  0332      K 4.0      CO2 24   BUN 14   CREATININE 0.4*      CALCIUM 9.1   MG 1.6   PHOS 3.5   AST 48*   ALT 57*   ALKPHOS 172*   BILITOT 1.9*   PROT 9.3*   ALBUMIN 3.4*     Recent Labs   Lab 07/26/23  0732   PH 7.312*   PCO2 55.5*   PO2 72*   HCO3 28.0       Microbiology Results (last 7 days)       Procedure Component Value Units Date/Time    Culture, Respiratory with Gram Stain [[id number]]  (Abnormal)  (Susceptibility) Collected: 07/24/23 1148    Order Status: Completed Specimen: Respiratory from Sputum Updated: 07/26/23 0831     Respiratory Culture PSEUDOMONAS AERUGINOSA   Many  MDRO  Isolate sent out for reference testing       Gram Stain (Respiratory) <10 epithelial cells per low power field.     Gram Stain (Respiratory) Many WBC's     Gram Stain (Respiratory) Moderate Gram negative rods    Urine culture [[id number]] Collected: 07/23/23 1602    Order Status: Completed Specimen: Urine Updated: 07/26/23 0703     Urine Culture, Routine No growth    Narrative:      Specimen Source->Urine    Blood culture x two cultures. Draw prior to antibiotics. [[id number]] Collected: 07/22/23 2044    Order Status: Completed Specimen: Blood Updated: 07/25/23 2232     Blood Culture, Routine No Growth to date      No Growth to date      No Growth to date      No Growth to date    Narrative:      Aerobic and anaerobic  Collection has been rescheduled by 2MB at 07/22/2023 19:43 Reason: Amairani liz said she will call when she wants cultures drawn  Collection has been rescheduled by 2MB at 07/22/2023 19:43 Reason: Amairani liz said she will call when she wants cultures drawn    Blood culture x two cultures. Draw prior to antibiotics. [[id number]] Collected: 07/22/23 2050    Order Status: Completed Specimen: Blood Updated: 07/25/23 2232     Blood Culture, Routine No Growth to date      No Growth to date      No Growth to date      No Growth to date     Narrative:      Aerobic and anaerobic  Collection has been rescheduled by 2MB at 07/22/2023 19:43 Reason: Amairani liz said she will call when she wants cultures drawn  Collection has been rescheduled by 2MB at 07/22/2023 19:43 Reason: Amairani liz said she will call when she wants cultures drawn    MRSA Screen by PCR [[id number]] Collected: 07/23/23 1610    Order Status: Completed Specimen: Nasopharyngeal Swab from Nasal Updated: 07/23/23 1750     MRSA SCREEN BY PCR Negative          CXR 7/25/23: Dense opacifications along the lateral periphery of the left lung, which could represent a large pneumonia; there are also diffuse bilateral alveolar opacities otherwise. This is similar to 7/21/23, but the left opacities are more prominent.    CXR 7/26/23: Similar to prior with diffuse, hazy, bilateral opacities, R>L.    Impression and Plan:  Active Hospital Problems    Diagnosis  POA    *Pneumonia of both lungs due to infectious organism [J18.9]  Yes    Pressure injury of buttock, stage 2 [L89.302]  Unknown    Hypoxia [R09.02]  Yes    Urine finding [R82.90]  Yes    Ventilator dependence [Z99.11]  Not Applicable    Essential hypertension [I10]  Yes    Anemia of chronic disease [D63.8]  Yes    Persistent vegetative state [R40.3]  Yes      Resolved Hospital Problems   No resolved problems to display.     Acute on chronic hypoxemic/hypercapneic resp failure  Vegetative state with trach/vent and PEG dependence  Ventilator-associated pneumonia due to /MDRO P. aeruginosa  UTI, complicated  Normocytic anemia  - continue vent; no weaning  - continue antibiotics as per ID  - f/u cultures  - monitor H/H  - continue airway clearance regimen  - continue feeds via PEG    #Partially deflated tracheostomy cuff  - cuff re-inflated  - if this keeps occurring, there may be a leak in the cuff, and the tracheostomy tube will need to be replaced.    Upon my evaluation, this patient had a high probability of imminent or life-threatening  deterioration, which required my direct attention, intervention, and personal management.    I have personally provided at least 35 minutes of critical care time exclusive of time spent on separately billable procedures. Over 50% of the time of this encounter was spent in direct care at the bedside. Time includes review of laboratory data, radiology results, discussion with consultants, and monitoring for potential decompensation. Interventions were performed as documented above.    Yonas Ritchie MD  Pulmonary and Critical Care Medicine  [facility]  Date of Service: 07/26/2023  10:26 AM

## 2023-07-26 NOTE — PROGRESS NOTES
[facility]  Adult Nutrition   Progress Note (Nutrition Support Management)    SUMMARY      Recommendations:   1. Continue current TF of Glucerna 1.5 at the goal rate of 30 mls/h to provide 1080 kcals, 59 g protein and 546 mls water.  2. Continue FWF's of 30 mls water every 4 hours to clear tube and meet fluid needs.   3. RD will continue to monitor rate/tolerance, weights, labs, etc. and make recommendations prn.     Goals:   1) Nutrition provision to meet 100% of pts energy and protein needs.   2) Pt to continue to tolerate TF at the goal rate.    Dietitian Rounds Brief  F/U Nutrition Note: Observed pt today during rounds with her TF of Glucerna 1.5 running at the goal rate of 30 mls/h and she was tolerating it just fine. Pt is also incontinent of her bowels with LBM on 7/22/2023. She is getting miralax prn for this. Will continue to follow prn.    Diet order: NPO    TF: Glucerna 1.5  Rate: 30 mls/h  Calories: 1080  Protein: 59 g  Fluid: 546 mls  Water flushes: 30 mls every 4 hours    % Intake of Estimated Energy Needs: 75 - 100 %  % Meal Intake: NPO    Estimated/Assessed Needs  Weight Used For Calorie Calculations: 89 kg (196 lb 3.4 oz)  Energy Calorie Requirements (kcal): 979-1246 kcals/day (11-14 kcals/kg ABW)  Energy Need Method: Kcal/kg  Protein Requirements: 68-90g/day (1.5-2 g/kg IBW)  Weight Used For Protein Calculations: 45 kg (99 lb 3.3 oz)     Estimated Fluid Requirement Method: RDA Method  RDA Method (mL): 979       Weight History:  Wt Readings from Last 5 Encounters:   07/24/23 89 kg (196 lb 3.4 oz)   07/06/23 88.6 kg (195 lb 5.2 oz)   06/12/23 77.1 kg (170 lb)   10/20/22 77.1 kg (170 lb)   08/26/22 77.2 kg (170 lb 3.1 oz)        Reason for Assessment  Reason For Assessment: consult, new tube feeding  Diagnosis: other (see comments) (Pneumonia of both lungs due to infectious organism)  Relevant Medical History: Anemia of chronic disease, Persistent vegetative state, Essential  hypertension, Ventilator dependence, Urine finding, Hypoxia  Interdisciplinary Rounds: attended    Medications:Pertinent Medications Reviewed  Scheduled Meds:   acetylcysteine 200 mg/ml (20%)  2 mL Nebulization TID WAKE    albuterol-ipratropium  3 mL Nebulization Q6H    carboxymethylcellulose  1 drop Both Eyes QID    ceftazidime-avibactam (AVYCAZ) IVPB  2.5 g Intravenous Q8H    enoxparin  40 mg Subcutaneous Daily    ergocalciferol  4,000 Units Per G Tube Daily    ferrous sulfate  300 mg Per G Tube Every other day    miconazole   Topical (Top) BID    midodrine  10 mg Per G Tube TID AC    mupirocin   Nasal BID     Continuous Infusions:  PRN Meds:.calcium gluconate IVPB, calcium gluconate IVPB, calcium gluconate IVPB, magnesium sulfate IVPB, magnesium sulfate IVPB, ondansetron, polyethylene glycol, potassium chloride **AND** potassium chloride **AND** potassium chloride, sodium chloride 0.9%, sodium phosphate IVPB, sodium phosphate IVPB, sodium phosphate IVPB    Labs: Pertinent Labs Reviewed  Clinical Chemistry:  Recent Labs   Lab 07/24/23 0335 07/25/23 0324 07/26/23  0332    143 140   K 4.1 3.6 4.0   * 113* 109   CO2 27 24 24   GLU 95 80 103   BUN 24* 21* 14   CREATININE 0.4* 0.4* 0.4*   CALCIUM 9.1 8.6* 9.1   PROT 8.6* 8.6* 9.3*   ALBUMIN 3.2* 3.2* 3.4*   BILITOT 1.2* 1.5* 1.9*   ALKPHOS 141* 148* 172*   AST 23 47* 48*   ALT 29 47* 57*   ANIONGAP 5* 6* 7*   MG 1.9 1.8 1.6   PHOS 3.2 2.6* 3.5     CBC:   Recent Labs   Lab 07/26/23  0332 07/26/23  0732   WBC 10.80  --    RBC 3.61*  --    HGB 9.5*  --    HCT 33.9* 32*     --    MCV 94  --    MCH 26.3*  --    MCHC 28.0*  --      Cardiac Profile:  Recent Labs   Lab 07/22/23  1934   *     Thyroid & Parathyroid:  Recent Labs   Lab 07/23/23  0315   TSH 1.880       Monitor and Evaluation  Food and Nutrient Intake: enteral nutrition intake  Food and Nutrient Adminstration: enteral and parenteral nutrition administration  Knowledge/Beliefs/Attitudes:  food and nutrition knowledge/skill, beliefs and attitudes  Physical Activity and Function: nutrition-related ADLs and IADLs, factors affecting access to physical activity  Anthropometric Measurements: weight, weight change, body mass index  Biochemical Data, Medical Tests and Procedures: lipid profile, inflammatory profile, glucose/endocrine profile, gastrointestinal profile, electrolyte and renal panel  Nutrition-Focused Physical Findings: overall appearance     Nutrition Risk  Level of Risk/Frequency of Follow-up: high     Nutrition Follow-Up  RD Follow-up?: Yes    Estrella Jo RD 07/26/2023 1:30 PM

## 2023-07-26 NOTE — PLAN OF CARE
The pt is a resident of [facility] and is not medically cleared for return per Dr. Garcia. CM following for discharge needs.    07/26/23 1527   Discharge Assessment   Assessment Type Discharge Planning Reassessment   Confirmed/corrected address, phone number and insurance Yes   Confirmed Demographics Correct on Facesheet   Source of Information patient;health record   Discharge Plan A Return to nursing home   Discharge Plan B Return to Nursing Home

## 2023-07-26 NOTE — CARE UPDATE
07/25/23 2010   Patient Assessment/Suction   Level of Consciousness (AVPU) responds to pain   Respiratory Effort Labored   Expansion/Accessory Muscles/Retractions no use of accessory muscles   All Lung Fields Breath Sounds coarse;diminished   Rhythm/Pattern, Respiratory labored   Cough Frequency with stimulation   Cough Type assisted;productive   Suction Method oral;tracheal   Suction Pressure (mmHg) -120 mmHg   $ Suction Charges Inline Suction Procedure Stat Charge   Secretions Amount large   Secretions Color creamy;yellow   Secretions Characteristics tenacious;thick   PRE-TX-O2   Device (Oxygen Therapy) ventilator   Oxygen Concentration (%) 70   SpO2 (!) 93 %   Pulse Oximetry Type Continuous   $ Pulse Oximetry - Multiple Charge Pulse Oximetry - Multiple   Pulse 95   Resp (!) 53   Aerosol Therapy   $ Aerosol Therapy Charges Aerosol Treatment   Daily Review of Necessity (SVN) completed   Respiratory Treatment Status (SVN) given   Treatment Route (SVN) in-line;ventilator   Patient Position (SVN) semi-Le's   Post Treatment Assessment (SVN) breath sounds unchanged   Signs of Intolerance (SVN) none   Adult Surgical Airway Shiley Cuffed 6.0/ 75mm   No placement date or time found.   Present Prior to Hospital Arrival?: Yes  Type: Tracheostomy  Brand: Shiley  Airway Device Style: Cuffed  Airway Device Size: 6.0/ 75mm   Cuff Inflation? Inflated   Cuff Air Leak Pressure   (MLT)   Speaking Valve Usage Not wearing   Status Secured   Site Assessment Clean;Dry   Site Care Cleansed;Dried;Dressing applied   Inner Cannula Care Changed/new   Ties Assessment Clean;Dry;Intact;Secure   Airway Safety   Is Ambu Bag and Mask with Patient? Yes, Adult Ambu Bag and Mask   Trach Supplies at Bedside Inner Cannula;Suction Catheter;Obturator;Ambu Bag and Mask (in patients room)   Suction set is at the bedside? Yes   Extra trach at bedside? Yes   Extra trach sizes at bedside? 4;6   Is Obturator Available? Yes   Location of Obturator?   Bedside table   Vent Select   Conventional Vent Y   $ Ventilator Subsequent 1   Charged w/in last 24h YES   Preset Conventional Ventilator Settings   Vent ID [id number]   Vent Type    Ventilation Type VC   Vent Mode A/C   Humidity HME   Set Rate 16 BPM   Vt Set 360 mL   PEEP/CPAP 5 cmH20   Pressure Support 0 cmH20   Waveform RAMP   Peak Flow 50 L/min   Plateau Set/Insp. Hold (sec) 0   Trigger Sensitivity Flow/I-Trigger 2.7 L/min   Patient Ventilator Parameters   Resp Rate Total 29 br/min   Peak Airway Pressure 30 cmH20   Mean Airway Pressure 12 cmH20   Plateau Pressure 0 cmH20   Exhaled Vt 376 mL   Total Ve 11 L/m   I:E Ratio Measured 1:1.60   Conventional Ventilator Alarms   Alarms On Y   Ve High Alarm 23 L/min   Ve Low Alarm 3 L/min   Vt High Alarm 1200 mL   Vt Low Alarm 200 mL   Resp Rate High Alarm 50 br/min   Press High Alarm 60 cmH2O   Apnea Rate 16   Apnea Volume (mL) 0 mL   Apnea Oxygen Concentration  100   Apnea Flow Rate (L/min) 50   T Apnea 25 sec(s)   Ready to Wean/Extubation Screen   FIO2<=50 (chart decimal) (!) 0.7   MV<16L (chart vol.) 11   PEEP <=8 (chart #) 5   Ready to Wean Parameters   F/VT Ratio<105 (RSBI) 140.96

## 2023-07-27 LAB
ALBUMIN SERPL BCP-MCNC: 2.9 G/DL (ref 3.5–5.2)
ALLENS TEST: ABNORMAL
ALP SERPL-CCNC: 143 U/L (ref 55–135)
ALT SERPL W/O P-5'-P-CCNC: 40 U/L (ref 10–44)
ANION GAP SERPL CALC-SCNC: 9 MMOL/L (ref 8–16)
AST SERPL-CCNC: 27 U/L (ref 10–40)
BACTERIA BLD CULT: NORMAL
BACTERIA BLD CULT: NORMAL
BASOPHILS # BLD AUTO: 0.01 K/UL (ref 0–0.2)
BASOPHILS NFR BLD: 0.1 % (ref 0–1.9)
BILIRUB SERPL-MCNC: 1.6 MG/DL (ref 0.1–1)
BUN SERPL-MCNC: 19 MG/DL (ref 6–20)
CALCIUM SERPL-MCNC: 8.8 MG/DL (ref 8.7–10.5)
CHLORIDE SERPL-SCNC: 107 MMOL/L (ref 95–110)
CO2 SERPL-SCNC: 27 MMOL/L (ref 23–29)
CREAT SERPL-MCNC: 0.3 MG/DL (ref 0.5–1.4)
DELSYS: ABNORMAL
DIFFERENTIAL METHOD: ABNORMAL
EOSINOPHIL # BLD AUTO: 0.4 K/UL (ref 0–0.5)
EOSINOPHIL NFR BLD: 4.5 % (ref 0–8)
ERYTHROCYTE [DISTWIDTH] IN BLOOD BY AUTOMATED COUNT: 21.7 % (ref 11.5–14.5)
ERYTHROCYTE [SEDIMENTATION RATE] IN BLOOD BY WESTERGREN METHOD: 24 MM/H
EST. GFR  (NO RACE VARIABLE): >60 ML/MIN/1.73 M^2
FIO2: 70
FIO2: 70
GLUCOSE SERPL-MCNC: 108 MG/DL (ref 70–110)
GLUCOSE SERPL-MCNC: 108 MG/DL (ref 70–110)
GLUCOSE SERPL-MCNC: 109 MG/DL (ref 70–110)
HCO3 UR-SCNC: 29.9 MMOL/L (ref 24–28)
HCO3 UR-SCNC: 30.4 MMOL/L (ref 24–28)
HCO3 UR-SCNC: 31.9 MMOL/L (ref 24–28)
HCT VFR BLD AUTO: 30.5 % (ref 37–48.5)
HCT VFR BLD CALC: 32 %PCV (ref 36–54)
HCT VFR BLD CALC: 36 %PCV (ref 36–54)
HGB BLD-MCNC: 8.7 G/DL (ref 12–16)
IMM GRANULOCYTES # BLD AUTO: 0.02 K/UL (ref 0–0.04)
IMM GRANULOCYTES NFR BLD AUTO: 0.2 % (ref 0–0.5)
LYMPHOCYTES # BLD AUTO: 1.6 K/UL (ref 1–4.8)
LYMPHOCYTES NFR BLD: 20.1 % (ref 18–48)
MAGNESIUM SERPL-MCNC: 1.9 MG/DL (ref 1.6–2.6)
MCH RBC QN AUTO: 26.1 PG (ref 27–31)
MCHC RBC AUTO-ENTMCNC: 28.5 G/DL (ref 32–36)
MCV RBC AUTO: 92 FL (ref 82–98)
MODE: ABNORMAL
MONOCYTES # BLD AUTO: 0.6 K/UL (ref 0.3–1)
MONOCYTES NFR BLD: 6.8 % (ref 4–15)
NEUTROPHILS # BLD AUTO: 5.5 K/UL (ref 1.8–7.7)
NEUTROPHILS NFR BLD: 68.3 % (ref 38–73)
NRBC BLD-RTO: 0 /100 WBC
PCO2 BLDA: 42.3 MMHG (ref 35–45)
PCO2 BLDA: 46.4 MMHG (ref 35–45)
PCO2 BLDA: 47.3 MMHG (ref 35–45)
PEEP: 10
PH SMN: 7.42 [PH] (ref 7.35–7.45)
PH SMN: 7.45 [PH] (ref 7.35–7.45)
PH SMN: 7.46 [PH] (ref 7.35–7.45)
PHOSPHATE SERPL-MCNC: 2.3 MG/DL (ref 2.7–4.5)
PLATELET # BLD AUTO: 200 K/UL (ref 150–450)
PMV BLD AUTO: 11.4 FL (ref 9.2–12.9)
PO2 BLDA: 59 MMHG (ref 80–100)
PO2 BLDA: 61 MMHG (ref 80–100)
PO2 BLDA: 84 MMHG (ref 80–100)
POC BE: 6 MMOL/L
POC BE: 6 MMOL/L
POC BE: 8 MMOL/L
POC IONIZED CALCIUM: 1.23 MMOL/L (ref 1.06–1.42)
POC IONIZED CALCIUM: 1.25 MMOL/L (ref 1.06–1.42)
POC SATURATED O2: 90 % (ref 95–100)
POC SATURATED O2: 92 % (ref 95–100)
POC SATURATED O2: 97 % (ref 95–100)
POC TCO2: 31 MMOL/L (ref 23–27)
POC TCO2: 32 MMOL/L (ref 23–27)
POC TCO2: 33 MMOL/L (ref 23–27)
POTASSIUM BLD-SCNC: 3.7 MMOL/L (ref 3.5–5.1)
POTASSIUM BLD-SCNC: 3.9 MMOL/L (ref 3.5–5.1)
POTASSIUM SERPL-SCNC: 3.9 MMOL/L (ref 3.5–5.1)
PROT SERPL-MCNC: 8.3 G/DL (ref 6–8.4)
RBC # BLD AUTO: 3.33 M/UL (ref 4–5.4)
SAMPLE: ABNORMAL
SITE: ABNORMAL
SODIUM BLD-SCNC: 142 MMOL/L (ref 136–145)
SODIUM BLD-SCNC: 143 MMOL/L (ref 136–145)
SODIUM SERPL-SCNC: 143 MMOL/L (ref 136–145)
SP02: 70
VT: 360
WBC # BLD AUTO: 8.06 K/UL (ref 3.9–12.7)

## 2023-07-27 PROCEDURE — 99900026 HC AIRWAY MAINTENANCE (STAT)

## 2023-07-27 PROCEDURE — 94760 N-INVAS EAR/PLS OXIMETRY 1: CPT

## 2023-07-27 PROCEDURE — 93005 ELECTROCARDIOGRAM TRACING: CPT | Performed by: SPECIALIST

## 2023-07-27 PROCEDURE — 27000221 HC OXYGEN, UP TO 24 HOURS

## 2023-07-27 PROCEDURE — 25000003 PHARM REV CODE 250: Performed by: INTERNAL MEDICINE

## 2023-07-27 PROCEDURE — 94640 AIRWAY INHALATION TREATMENT: CPT

## 2023-07-27 PROCEDURE — 94761 N-INVAS EAR/PLS OXIMETRY MLT: CPT

## 2023-07-27 PROCEDURE — 36600 WITHDRAWAL OF ARTERIAL BLOOD: CPT

## 2023-07-27 PROCEDURE — 84100 ASSAY OF PHOSPHORUS: CPT | Performed by: FAMILY MEDICINE

## 2023-07-27 PROCEDURE — 36415 COLL VENOUS BLD VENIPUNCTURE: CPT | Performed by: FAMILY MEDICINE

## 2023-07-27 PROCEDURE — 25000242 PHARM REV CODE 250 ALT 637 W/ HCPCS: Performed by: INTERNAL MEDICINE

## 2023-07-27 PROCEDURE — 94003 VENT MGMT INPAT SUBQ DAY: CPT

## 2023-07-27 PROCEDURE — 80053 COMPREHEN METABOLIC PANEL: CPT | Performed by: FAMILY MEDICINE

## 2023-07-27 PROCEDURE — 84132 ASSAY OF SERUM POTASSIUM: CPT

## 2023-07-27 PROCEDURE — 93010 EKG 12-LEAD: ICD-10-PCS | Mod: ,,, | Performed by: SPECIALIST

## 2023-07-27 PROCEDURE — 99233 PR SUBSEQUENT HOSPITAL CARE,LEVL III: ICD-10-PCS | Mod: ,,, | Performed by: STUDENT IN AN ORGANIZED HEALTH CARE EDUCATION/TRAINING PROGRAM

## 2023-07-27 PROCEDURE — 99291 CRITICAL CARE FIRST HOUR: CPT | Mod: ,,, | Performed by: INTERNAL MEDICINE

## 2023-07-27 PROCEDURE — 99900035 HC TECH TIME PER 15 MIN (STAT)

## 2023-07-27 PROCEDURE — 25000003 PHARM REV CODE 250: Performed by: FAMILY MEDICINE

## 2023-07-27 PROCEDURE — 99499 NO LOS: ICD-10-PCS | Mod: ,,, | Performed by: INTERNAL MEDICINE

## 2023-07-27 PROCEDURE — 85014 HEMATOCRIT: CPT

## 2023-07-27 PROCEDURE — 99233 SBSQ HOSP IP/OBS HIGH 50: CPT | Mod: ,,, | Performed by: STUDENT IN AN ORGANIZED HEALTH CARE EDUCATION/TRAINING PROGRAM

## 2023-07-27 PROCEDURE — 25000003 PHARM REV CODE 250: Performed by: STUDENT IN AN ORGANIZED HEALTH CARE EDUCATION/TRAINING PROGRAM

## 2023-07-27 PROCEDURE — 20000000 HC ICU ROOM

## 2023-07-27 PROCEDURE — 99900031 HC PATIENT EDUCATION (STAT)

## 2023-07-27 PROCEDURE — 63600175 PHARM REV CODE 636 W HCPCS: Performed by: FAMILY MEDICINE

## 2023-07-27 PROCEDURE — 63600175 PHARM REV CODE 636 W HCPCS: Mod: JZ,JG | Performed by: STUDENT IN AN ORGANIZED HEALTH CARE EDUCATION/TRAINING PROGRAM

## 2023-07-27 PROCEDURE — 93308 TTE F-UP OR LMTD: CPT | Mod: 26,,, | Performed by: INTERNAL MEDICINE

## 2023-07-27 PROCEDURE — 93010 ELECTROCARDIOGRAM REPORT: CPT | Mod: ,,, | Performed by: SPECIALIST

## 2023-07-27 PROCEDURE — 99291 PR CRITICAL CARE, E/M 30-74 MINUTES: ICD-10-PCS | Mod: ,,, | Performed by: INTERNAL MEDICINE

## 2023-07-27 PROCEDURE — 85025 COMPLETE CBC W/AUTO DIFF WBC: CPT | Performed by: FAMILY MEDICINE

## 2023-07-27 PROCEDURE — 93308 PR ECHO HEART XTHORACIC,LIMITED: ICD-10-PCS | Mod: 26,,, | Performed by: INTERNAL MEDICINE

## 2023-07-27 PROCEDURE — 83735 ASSAY OF MAGNESIUM: CPT | Performed by: FAMILY MEDICINE

## 2023-07-27 PROCEDURE — 84295 ASSAY OF SERUM SODIUM: CPT

## 2023-07-27 PROCEDURE — 99499 UNLISTED E&M SERVICE: CPT | Mod: ,,, | Performed by: INTERNAL MEDICINE

## 2023-07-27 PROCEDURE — 82330 ASSAY OF CALCIUM: CPT

## 2023-07-27 PROCEDURE — 82803 BLOOD GASES ANY COMBINATION: CPT

## 2023-07-27 RX ORDER — ADENOSINE 3 MG/ML
6 INJECTION, SOLUTION INTRAVENOUS ONCE
Status: DISCONTINUED | OUTPATIENT
Start: 2023-07-27 | End: 2023-07-27

## 2023-07-27 RX ORDER — METOPROLOL TARTRATE 50 MG/1
50 TABLET ORAL EVERY 6 HOURS
Status: DISCONTINUED | OUTPATIENT
Start: 2023-07-27 | End: 2023-08-01 | Stop reason: HOSPADM

## 2023-07-27 RX ORDER — ADENOSINE 3 MG/ML
INJECTION, SOLUTION INTRAVENOUS
Status: DISPENSED
Start: 2023-07-27 | End: 2023-07-28

## 2023-07-27 RX ADMIN — CARBOXYMETHYLCELLULOSE SODIUM 1 DROP: 5 SOLUTION/ DROPS OPHTHALMIC at 10:07

## 2023-07-27 RX ADMIN — Medication 4000 UNITS: at 09:07

## 2023-07-27 RX ADMIN — ACETYLCYSTEINE 2 ML: 200 SOLUTION ORAL; RESPIRATORY (INHALATION) at 07:07

## 2023-07-27 RX ADMIN — MIDODRINE HYDROCHLORIDE 10 MG: 2.5 TABLET ORAL at 04:07

## 2023-07-27 RX ADMIN — MUPIROCIN 1 G: 20 OINTMENT TOPICAL at 08:07

## 2023-07-27 RX ADMIN — MUPIROCIN: 20 OINTMENT TOPICAL at 09:07

## 2023-07-27 RX ADMIN — CARBOXYMETHYLCELLULOSE SODIUM 1 DROP: 5 SOLUTION/ DROPS OPHTHALMIC at 09:07

## 2023-07-27 RX ADMIN — ACETYLCYSTEINE 2 ML: 200 SOLUTION ORAL; RESPIRATORY (INHALATION) at 08:07

## 2023-07-27 RX ADMIN — CARBOXYMETHYLCELLULOSE SODIUM 1 DROP: 5 SOLUTION/ DROPS OPHTHALMIC at 12:07

## 2023-07-27 RX ADMIN — DEXTROSE MONOHYDRATE 2.5 G: 50 INJECTION, SOLUTION INTRAVENOUS at 09:07

## 2023-07-27 RX ADMIN — CARBOXYMETHYLCELLULOSE SODIUM 1 DROP: 5 SOLUTION/ DROPS OPHTHALMIC at 05:07

## 2023-07-27 RX ADMIN — MIDODRINE HYDROCHLORIDE 10 MG: 2.5 TABLET ORAL at 06:07

## 2023-07-27 RX ADMIN — MIDODRINE HYDROCHLORIDE 10 MG: 2.5 TABLET ORAL at 12:07

## 2023-07-27 RX ADMIN — IPRATROPIUM BROMIDE AND ALBUTEROL SULFATE 3 ML: 2.5; .5 SOLUTION RESPIRATORY (INHALATION) at 07:07

## 2023-07-27 RX ADMIN — IPRATROPIUM BROMIDE AND ALBUTEROL SULFATE 3 ML: 2.5; .5 SOLUTION RESPIRATORY (INHALATION) at 01:07

## 2023-07-27 RX ADMIN — METOPROLOL TARTRATE 50 MG: 50 TABLET, FILM COATED ORAL at 05:07

## 2023-07-27 RX ADMIN — MICONAZOLE NITRATE: 20 CREAM TOPICAL at 09:07

## 2023-07-27 RX ADMIN — ENOXAPARIN SODIUM 40 MG: 100 INJECTION SUBCUTANEOUS at 05:07

## 2023-07-27 RX ADMIN — ACETYLCYSTEINE 2 ML: 200 SOLUTION ORAL; RESPIRATORY (INHALATION) at 01:07

## 2023-07-27 RX ADMIN — MICONAZOLE NITRATE: 20 CREAM TOPICAL at 08:07

## 2023-07-27 RX ADMIN — DEXTROSE MONOHYDRATE 2.5 G: 50 INJECTION, SOLUTION INTRAVENOUS at 02:07

## 2023-07-27 NOTE — NURSING
Hr of 170 noted on the monitor at 1630. Stat Ekg done and showed svt 166 BPM. Sats noted to be 84%. ncreased FiO2 to 100 sats increased to 98 Dr. Ritchie and Jose notified. Jose at bedside to adminster adenosine but patient went into sinus tach hr of 116. Adenosine was not administered. Will treat with beta blocker per Dr. Garcia. Pt FiO2 now at 70% and she is now sating 95%. Will continue to monitor.

## 2023-07-27 NOTE — NURSING
Hr of 166 noted on the monitor at 1523. Stat Ekg ordered and showed sinus tach of 116 at 1526. Pt is tachypneic and sats are 95%. Hr is now 110 at 1530.  notified of event and stated to keep monitoring pt. No other issues noted at this time. Will continue to monitor

## 2023-07-27 NOTE — PLAN OF CARE
Unable to discuss plan of care with patient due to level of consciousness     Problem: Feeding Intolerance (Enteral Nutrition)  Goal: Feeding Tolerance  Outcome: Ongoing, Progressing-Tolerates feeding well     Problem: Ventilator-Induced Lung Injury (Mechanical Ventilation, Invasive)  Goal: Absence of Ventilator-Induced Lung Injury  Outcome: Ongoing, Progressing- Vent to trach intact , sats maintained.     Problem: Adult Inpatient Plan of Care  Goal: Absence of Hospital-Acquired Illness or Injury  Outcome: Ongoing, Progressing-Safety maintained

## 2023-07-27 NOTE — PROCEDURES
ICU US CHEST MEDIASTINUM    Date/Time: 7/22/2023 7:19 PM  Performed by: Yonas Ritchie MD  Authorized by: Yonas Ritchie MD     Indication/diagnosis: hypotension    IVC was small and collapsible. Unable to obtain good windows for other cardiac views.

## 2023-07-27 NOTE — PROCEDURES
TRACHEOSTOMY TUBE EXHANGE    INDICATION: suspected leak of old tracheostomy cuff    ASSESSMENT: Patient was having intermittent hypoxemia and intermittently not expiring the programmed tidal volumes with audible air leak around the trach cuff despite repeatedly re-inflating the cuff. A perforation of the cuff and leak of the air inside was suspected.     PRE-PROCEDURE DIAGNOSIS: Chronic respiratory failure and inability to protect airway due to severe anoxic brain injury    POST-PROCEDURE DIAGNOSIS: same    PROCEDURE SUMMARY:      The Shiley 8 was deflated and removed. A Shiley 8 XLT tube was put in its place. The tube was inflated, and tidal volumes were confirmed.    No immediate complications. No blood loss.

## 2023-07-27 NOTE — SUBJECTIVE & OBJECTIVE
"Interval History: see "Hospital Course"    Review of Systems   Unable to perform ROS: Patient nonverbal   Objective:     Vital Signs (Most Recent):  Temp: 97 °F (36.1 °C) (07/27/23 0400)  Pulse: 88 (07/27/23 1127)  Resp: 18 (07/27/23 1127)  BP: 118/65 (07/27/23 0901)  SpO2: (!) 94 % (07/27/23 1127) Vital Signs (24h Range):  Temp:  [97 °F (36.1 °C)-98.4 °F (36.9 °C)] 97 °F (36.1 °C)  Pulse:  [] 88  Resp:  [8-33] 18  SpO2:  [92 %-100 %] 94 %  BP: ()/(54-83) 118/65     Weight: 92.7 kg (204 lb 5.9 oz)  Body mass index is 39.91 kg/m².    Intake/Output Summary (Last 24 hours) at 7/27/2023 1302  Last data filed at 7/27/2023 0630  Gross per 24 hour   Intake 1429.6 ml   Output 1950 ml   Net -520.4 ml         Physical Exam  Vitals and nursing note reviewed.   Constitutional:       General: She is not in acute distress.     Appearance: She is ill-appearing.   HENT:      Head: Normocephalic and atraumatic.      Right Ear: External ear normal.      Left Ear: External ear normal.      Nose: Nose normal.      Mouth/Throat:      Mouth: Mucous membranes are moist.      Pharynx: Oropharynx is clear.   Eyes:      Extraocular Movements: Extraocular movements intact.      Conjunctiva/sclera: Conjunctivae normal.   Neck:      Comments: Tracheostomy.  Cardiovascular:      Rate and Rhythm: Normal rate and regular rhythm.      Pulses: Normal pulses.      Heart sounds: Normal heart sounds.   Pulmonary:      Breath sounds: Normal breath sounds.      Comments: MV.  Abdominal:      General: Bowel sounds are normal.      Palpations: Abdomen is soft.      Tenderness: There is no abdominal tenderness.      Comments: G tube.   Genitourinary:     Comments: Hamilton.  Musculoskeletal:         General: Normal range of motion.   Skin:     General: Skin is warm and dry.   Neurological:      Mental Status: Mental status is at baseline.           Significant Labs: All pertinent labs within the past 24 hours have been reviewed.    Significant " Imaging: I have reviewed all pertinent imaging results/findings within the past 24 hours.

## 2023-07-27 NOTE — ASSESSMENT & PLAN NOTE
Body mass index is 39.91 kg/m². Morbid obesity complicates all aspects of disease management from diagnostic modalities to treatment.

## 2023-07-27 NOTE — PROGRESS NOTES
"[facility]  Progress Note    Patient Name: Genna Felix  MRN: [id number]  Patient Class: IP- Inpatient   Admission Date: 7/22/2023  Length of Stay: 5 days  Attending Physician: Kirill Garcia MD  Primary Care Provider: Primary Doctor No        Subjective:     Principal Problem:Acute on chronic respiratory failure        HPI:  Patient presents to ER due to respiratory distress.     Spoke with sister, Amelia. States that she was told her oxygen was dropping low at Ms. Felix's nursing home, [place]. Was transferred to the ER. Called [facility] for more info and spoke with Ms. Yousif (a nurse who got sign out from the day team about patient). Was told that Respiratory was trying to improve her sats throughout the day however it never improved above 87%. Patient ws then transferred to ER. Staff noticed urine output was dirty with sediment so u/a and urine culture ordered.      Overview/Hospital Course:  Genna Felix is a 62 year old female with a past medical history of anoxic brain injury s/p G tube and tracheostomy, PE, GERD, HTN, hepatic steatosis, obesity, and anemia who presented with severe sepsis with acute respiratory failure requiring mechanical ventilation. Respiratory cultures show  Pseudomonas. ID has been consulted and started Avycaz. Pulmonary has also been consulted for ventilator management.      Interval History: see "Hospital Course"    Review of Systems   Unable to perform ROS: Patient nonverbal   Objective:     Vital Signs (Most Recent):  Temp: 97 °F (36.1 °C) (07/27/23 0400)  Pulse: 88 (07/27/23 1127)  Resp: 18 (07/27/23 1127)  BP: 118/65 (07/27/23 0901)  SpO2: (!) 94 % (07/27/23 1127) Vital Signs (24h Range):  Temp:  [97 °F (36.1 °C)-98.4 °F (36.9 °C)] 97 °F (36.1 °C)  Pulse:  [] 88  Resp:  [8-33] 18  SpO2:  [92 %-100 %] 94 %  BP: ()/(54-83) 118/65     Weight: 92.7 kg (204 lb 5.9 oz)  Body mass index is 39.91 kg/m².    Intake/Output Summary " (Last 24 hours) at 7/27/2023 1302  Last data filed at 7/27/2023 0630  Gross per 24 hour   Intake 1429.6 ml   Output 1950 ml   Net -520.4 ml         Physical Exam  Vitals and nursing note reviewed.   Constitutional:       General: She is not in acute distress.     Appearance: She is ill-appearing.   HENT:      Head: Normocephalic and atraumatic.      Right Ear: External ear normal.      Left Ear: External ear normal.      Nose: Nose normal.      Mouth/Throat:      Mouth: Mucous membranes are moist.      Pharynx: Oropharynx is clear.   Eyes:      Extraocular Movements: Extraocular movements intact.      Conjunctiva/sclera: Conjunctivae normal.   Neck:      Comments: Tracheostomy.  Cardiovascular:      Rate and Rhythm: Normal rate and regular rhythm.      Pulses: Normal pulses.      Heart sounds: Normal heart sounds.   Pulmonary:      Breath sounds: Normal breath sounds.      Comments: MV.  Abdominal:      General: Bowel sounds are normal.      Palpations: Abdomen is soft.      Tenderness: There is no abdominal tenderness.      Comments: G tube.   Genitourinary:     Comments: Hamilton.  Musculoskeletal:         General: Normal range of motion.   Skin:     General: Skin is warm and dry.   Neurological:      Mental Status: Mental status is at baseline.           Significant Labs: All pertinent labs within the past 24 hours have been reviewed.    Significant Imaging: I have reviewed all pertinent imaging results/findings within the past 24 hours.      Assessment/Plan:      * Acute on chronic respiratory failure  -Continue Avycaz  -CXR and ABG PRN  -Pulmonary consulted    Hepatic steatosis  Chronic. Stable.  -Trend LFTs      Obesity (BMI 30-39.9)  Body mass index is 39.91 kg/m². Morbid obesity complicates all aspects of disease management from diagnostic modalities to treatment.         Pressure injury of buttock, stage 2  -Wound Care consulted      Pneumonia of both lungs due to infectious organism  -ID consulted  -Continue  Avycaz      Difficult Hamilton catheter placement        Essential hypertension  -Continue to monitor      PEG (percutaneous endoscopic gastrostomy) status  -Care per Nursing      Tracheostomy dependence  -Care per RT      Persistent vegetative state  -Turn patient q2 hours  -Shower patient daily  -Consult Nutrition for diet recs    Anemia of chronic disease  -Trend Hgb with CBC        VTE Risk Mitigation (From admission, onward)         Ordered     enoxaparin injection 40 mg  Daily         07/23/23 0225     IP VTE HIGH RISK PATIENT  Once         07/23/23 0225     Place sequential compression device  Until discontinued         07/23/23 0225                Discharge Planning   [place]: 7/28/2023     Code Status: Full Code   Is the patient medically ready for discharge?:     Reason for patient still in hospital (select all that apply): Patient trending condition, Treatment and Consult recommendations  Discharge Plan A: Return to nursing home                  Kirill Garcia MD  Department of Hospital Medicine   [facility]

## 2023-07-27 NOTE — PROGRESS NOTES
07/27/2023      Admit Date: 7/22/2023  Genna Felix  Pulmonary and Critical Care Inpatient Progress Note    Chief Complaint   Patient presents with    Shortness of Breath     Arrived from [place] via [facility] with c/c SOB that started earlier today       History of Present Illness:  Pt is a 63 yo female with hepatic steatosis, PE, GERD, anoxic brain injury dependent on trach/vent and PEG who presented to the ED with desaturation. She was found to have pneumonia and UTI. Pulmonary is consulted for vent management. She is hypothermic requiring warming blanket today. Hx is gathered from chart review. There is no family at bedside.    7/24/23: Minimal urine output. Unable to place [place]. Obtaining bladder scan -- unable to quantify, but full of fluid. Re-attempting [place].    7/25/23: Stable.    7/26/23: Respiratory acidosis this morning and patient noted to have some increased respiratory effort. There was an audible leak around the tracheostomy tube, which resolved when I more fully inflated the cuff.    7/27/23: Stable this morning. ABG with mild metabolic alkalosis, likely due to increased bicarbonate which was compensatory for her recent hypercapnia, but she is now tachypneic with a normal PaCO2.      PFSH:  Past Medical History:   Diagnosis Date    Anoxic brain damage 07/2020    Bedbound 07/2020    Cardiac arrest 07/2020    Cirrhosis     GERD (gastroesophageal reflux disease)     Hemangioma of intra-abdominal structure     Hypertension     Nursing home resident     Protein calorie malnutrition     Pulmonary embolus     Respiratory distress     S/P percutaneous endoscopic gastrostomy (PEG) tube placement 07/2020    Total self-care deficit 07/2020    Tracheostomy dependence     7/2020     Past Surgical History:   Procedure Laterality Date    PEG W/TRACHEOSTOMY PLACEMENT  07/27/2020    SKIN GRAFT      buttocks     Social History     Tobacco Use    Smoking status: Never    Smokeless tobacco: Never   Substance Use  Topics    Alcohol use: Not Currently    Drug use: Not Currently     Family History   Problem Relation Age of Onset    Hypertension Mother     No Known Problems Father      Review of patient's allergies indicates:  No Known Allergies    Performance Status:Performance Status:The patient's activity level is bedbound.    Review of Systems:  due to neurologic status/impairments a     Exam:Comprehensive exam done. /65   Pulse 92   Temp 97 °F (36.1 °C) (Oral)   Resp (!) 8   Ht 5' (1.524 m)   Wt 92.7 kg (204 lb 5.9 oz)   SpO2 (!) 92%   Breastfeeding No   BMI 39.91 kg/m²       PHYSICAL EXAM  GENERAL:  NAD.  HEENT: EOMI. PERRLA. Opens eyes spontaneously, but no response to confrontation.  NECK: No cervical adenopathy palpated. No JVD or HJR.  HEART: Regular rate and rhythm. No murmur or gallop auscultated.  LUNGS: No crackles or wheezing on anterior auscultation. Audible cuff leak.  ABDOMEN: Bowel sounds present. Soft, non-tender, non-distended.  EXTREMITIES: Normal muscle tone and joint movement, no cyanosis or clubbing.   LYMPHATICS: No adenopathy palpated, no edema.  SKIN: Dry, intact, no lesions.   NEURO: Unresponsive.  PSYCH: Unable to assess.    Imaging Results              CTA Chest Abdomen Pelvis (Final result)  Result time 07/22/23 22:24:45      Final result by Danny Rasmussen DO (07/22/23 22:24:45)                   Narrative:    PROCEDURE:  CT Angiography Chest, Abdomen and Pelvis With Intravenous Contrast    CLINICAL INDICATION:  The patient is 62 years old and is Female; respiratory distress    TECHNIQUE:  Axial computed tomographic angiography images of the chest, abdomen and pelvis with intravenous contrast.  This CT exam was performed using one or more of the following dose reduction techniques:  automated exposure control, adjustment of the mA and/or kV according to patient size, and/or use of iterative reconstruction technique.  MIP reconstructed images were created and reviewed.    DLP:  955 mGycm    CONTRAST:  100mL of Omnipaque 350 was administered intravenously.    COMPARISON:  Chest radiograph of the same day and CT abdomen and pelvis dated 7/6/2023.    FINDINGS:    VASCULATURE:  AORTA:  No acute findings.  No aortic aneurysm.  No dissection.  PULMONARY ARTERIES:  Unremarkable as visualized.  No pulmonary embolism is identified.  GREAT VESSELS OF AORTIC ARCH:  No acute findings.  No dissection.  No arterial occlusion or significant stenosis.  CELIAC TRUNK AND MESENTERIC ARTERIES:  No acute findings.  No occlusion or significant stenosis.  RENAL ARTERIES:  No acute findings.  No occlusion or significant stenosis.  ILIAC ARTERIES:  No acute findings.  No occlusion or significant stenosis.    CHEST:  LUNGS:  Bilateral interstitial opacities with interlobular septal thickening and consolidation in the right lower lobe.  PLEURAL SPACE:  Small bilateral pleural effusions.  No pneumothorax.  HEART:  Unremarkable.  No cardiomegaly.  No significant pericardial effusion.  MEDIASTINUM:  Mediastinal and bihilar lymphadenopathy.    ABDOMEN:  LIVER:  Hepatic steatosis.  GALLBLADDER AND BILE DUCTS:  Unremarkable.  No calcified stones.  No ductal dilation.  PANCREAS:  Unremarkable.  No ductal dilation.  No mass.  SPLEEN:  Splenomegaly.  ADRENALS:  Unremarkable.  No mass.  KIDNEYS AND URETERS:  Unremarkable.  No hydronephrosis.  No solid mass.  STOMACH AND BOWEL:  Unremarkable.  No obstruction.  No mucosal thickening.    PELVIS:  APPENDIX:  No findings to suggest acute appendicitis.  BLADDER:  Bladder is decompressed.  REPRODUCTIVE:  Prior hysterectomy.    CHEST, ABDOMEN and PELVIS:  INTRAPERITONEAL SPACE:  Unremarkable.  No significant fluid collection.  No free air.  BONES/JOINTS:  Severe heterotopic bone formation arising from the left acetabulum. Milder similar-appearing finding on the right. Advanced left shoulder degenerative changes.  Diffuse osteopenia. Multilevel degenerative changes.  No acute  fracture.  No dislocation.  SOFT TISSUES:  Fat-containing umbilical hernia.  LYMPH NODES:  Unremarkable.  No enlarged lymph nodes.  TUBES, LINES AND DEVICES:  Partially evaluated gastrostomy and tracheostomy tubes.    IMPRESSION:    1.  No acute aortic abnormality or pulmonary embolism.    2.  Bilateral interstitial opacities with interlobular septal thickening and consolidation in the right lower lobe.  Imaging features can be seen with COVID pneumonia, though are nonspecific and can occur with a variety of infectious and noninfectious processes. PneInd.    3.  Mediastinal and bihilar lymphadenopathy.    4.  Small bilateral pleural effusions.    5.  No acute abdominal or pelvic abnormality.    6.  Splenomegaly.    7.  Hepatic steatosis.    Electronically signed by:  Danny Rasmussen DO  7/22/2023 10:24 PM CDT Workstation: [id number]                                     X-Ray Chest AP Portable (Final result)  Result time 07/22/23 19:49:01      Final result by Jayant Junior MD (07/22/23 19:49:01)                   Narrative:    CLINICAL DATA:  Sepsis and shortness of breath    TECHNIQUE:  Views: A single AP view.  Limitations: The images are technically satisfactory.    COMPARISON:  July 8, 2023    FINDINGS:  CARDIOVASCULAR: Normal.    LUNGS AND PLEURA:  1. Infiltrative changes are seen diffusely throughout both lungs. Allowing for technical differences these have increased slightly since last examination.    MEDIASTINAL AND HILAR STRUCTURES: Normal.    OSSEOUS STRUCTURES: Normal.    ADDITIONAL FINDINGS:  1. The tracheostomy tube remains in good position.    IMPRESSION:    1.  Slight increasing infiltrates in the lungs.    This document was created using a voice recognition transcribing system. Incorrect words or phrases may have been missed during proof reading. Please interpret accordingly or contact the radiologist for clarification if necessary.    Electronically signed by:  Jayant Junior MD  7/22/2023  7:49 PM CDT Workstation: [id number]                                        Labs     Recent Labs   Lab 07/27/23 0311   WBC 8.06   HGB 8.7*   HCT 30.5*        Recent Labs   Lab 07/27/23 0311      K 3.9      CO2 27   BUN 19   CREATININE 0.3*      CALCIUM 8.8   MG 1.9   PHOS 2.3*   AST 27   ALT 40   ALKPHOS 143*   BILITOT 1.6*   PROT 8.3   ALBUMIN 2.9*     Recent Labs   Lab 07/27/23  0943   PH 7.458*   PCO2 42.3   PO2 61*   HCO3 29.9*       Microbiology Results (last 7 days)       Procedure Component Value Units Date/Time    Culture, ID (Consult) [[id number]] Collected: 07/24/23 1148    Order Status: No result Specimen: Respiratory from Sputum Updated: 07/27/23 0937    Blood culture x two cultures. Draw prior to antibiotics. [[id number]] Collected: 07/22/23 2044    Order Status: Completed Specimen: Blood Updated: 07/26/23 2232     Blood Culture, Routine No Growth to date      No Growth to date      No Growth to date      No Growth to date      No Growth to date    Narrative:      Aerobic and anaerobic  Collection has been rescheduled by 2MB at 07/22/2023 19:43 Reason: Amairani liz said she will call when she wants cultures drawn  Collection has been rescheduled by 2MB at 07/22/2023 19:43 Reason: Amairani liz said she will call when she wants cultures drawn    Blood culture x two cultures. Draw prior to antibiotics. [[id number]] Collected: 07/22/23 2050    Order Status: Completed Specimen: Blood Updated: 07/26/23 2232     Blood Culture, Routine No Growth to date      No Growth to date      No Growth to date      No Growth to date      No Growth to date    Narrative:      Aerobic and anaerobic  Collection has been rescheduled by 2MB at 07/22/2023 19:43 Reason: Amairani liz said she will call when she wants cultures drawn  Collection has been rescheduled by 2MB at 07/22/2023 19:43 Reason: Amairani liz said she will call when she wants cultures drawn    Culture, Respiratory with Gram  Stain [[id number]]  (Abnormal)  (Susceptibility) Collected: 07/24/23 1148    Order Status: Completed Specimen: Respiratory from Sputum Updated: 07/26/23 0831     Respiratory Culture PSEUDOMONAS AERUGINOSA   Many  MDRO  Isolate sent out for reference testing       Gram Stain (Respiratory) <10 epithelial cells per low power field.     Gram Stain (Respiratory) Many WBC's     Gram Stain (Respiratory) Moderate Gram negative rods    Urine culture [[id number]] Collected: 07/23/23 1602    Order Status: Completed Specimen: Urine Updated: 07/26/23 0703     Urine Culture, Routine No growth    Narrative:      Specimen Source->Urine    MRSA Screen by PCR [[id number]] Collected: 07/23/23 1610    Order Status: Completed Specimen: Nasopharyngeal Swab from Nasal Updated: 07/23/23 1750     MRSA SCREEN BY PCR Negative          CXR 7/25/23: Dense opacifications along the lateral periphery of the left lung, which could represent a large pneumonia; there are also diffuse bilateral alveolar opacities otherwise. This is similar to 7/21/23, but the left opacities are more prominent.    CXR 7/26/23: Similar to prior with diffuse, hazy, bilateral opacities, R>L.    Impression and Plan:  Active Hospital Problems    Diagnosis  POA    *Acute on chronic respiratory failure [J96.20]  Yes    Obesity (BMI 30-39.9) [E66.9]  Yes    Hepatic steatosis [K76.0]  Yes    Pressure injury of buttock, stage 2 [L89.302]  Yes    Pneumonia of both lungs due to infectious organism [J18.9]  Yes    Essential hypertension [I10]  Yes    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]  Not Applicable    PEG (percutaneous endoscopic gastrostomy) status [Z93.1]  Not Applicable    Anemia of chronic disease [D63.8]  Yes    Persistent vegetative state [R40.3]  Yes      Resolved Hospital Problems   No resolved problems to display.     Acute on chronic hypoxemic/hypercapneic resp failure  Vegetative state with trach/vent and PEG dependence  Ventilator-associated pneumonia due to /MDRO P.  aeruginosa  UTI, complicated  Normocytic anemia  - continue vent; no weaning  - continue antibiotics as per ID  - f/u cultures  - monitor H/H  - continue airway clearance regimen  - continue feeds via PEG    #Partially deflated tracheostomy cuff  - cuff re-inflated  - if this keeps occurring, there may be a leak in the cuff, and the tracheostomy tube will need to be replaced.    #Hypoxemia  Likely due to pneumonia.  - wean O2 as tolerated for SpO2 90% or greater  - CXR in AM    #Mild metabolic alkalosis  Likely due to increased bicarbonate which was compensatory for her recent hypercapnia, but she is now tachypneic with a normal PaCO2.  - monitor ABG each morning and as needed    Upon my evaluation, this patient had a high probability of imminent or life-threatening deterioration, which required my direct attention, intervention, and personal management.    I have personally provided at least 35 minutes of critical care time exclusive of time spent on separately billable procedures. Over 50% of the time of this encounter was spent in direct care at the bedside. Time includes review of laboratory data, radiology results, discussion with consultants, and monitoring for potential decompensation. Interventions were performed as documented above.    Yonas Ritchie MD  Pulmonary and Critical Care Medicine  [facility]  Date of Service: 07/27/2023  10:26 AM

## 2023-07-27 NOTE — CARE UPDATE
07/26/23 1943   Patient Assessment/Suction   Level of Consciousness (AVPU) responds to pain   Respiratory Effort Normal;Unlabored   Expansion/Accessory Muscles/Retractions no use of accessory muscles   All Lung Fields Breath Sounds equal bilaterally;diminished;coarse   Rhythm/Pattern, Respiratory assisted mechanically   Cough Frequency with stimulation   Cough Type assisted   Suction Method tracheal;oral   $ Suction Charges Inline Suction Procedure Stat Charge   Secretions Amount moderate   Secretions Color pale;yellow   Secretions Characteristics thick   PRE-TX-O2   Device (Oxygen Therapy) ventilator   Oxygen Concentration (%) 70   SpO2 99 %   Pulse Oximetry Type Continuous   $ Pulse Oximetry - Multiple Charge Pulse Oximetry - Multiple   Pulse 107   Resp (!) 27   Aerosol Therapy   $ Aerosol Therapy Charges Aerosol Treatment   Daily Review of Necessity (SVN) completed   Respiratory Treatment Status (SVN) given   Treatment Route (SVN) in-line;ventilator;tracheostomy   Patient Position (SVN) semi-Le's   Post Treatment Assessment (SVN) breath sounds unchanged   Signs of Intolerance (SVN) none   Breath Sounds Post-Respiratory Treatment   Throughout All Fields Post-Treatment All Fields   Throughout All Fields Post-Treatment no change   Post-treatment Heart Rate (beats/min) 102   Post-treatment Resp Rate (breaths/min) 30   Vent Select   Conventional Vent Y   Charged w/in last 24h YES   Preset Conventional Ventilator Settings   Vent ID [id number]   Vent Type    Ventilation Type VC   Vent Mode A/C   Humidity HME   Set Rate 24 BPM   Vt Set 360 mL   PEEP/CPAP 10 cmH20   Pressure Support 0 cmH20   Waveform RAMP   Peak Flow 60 L/min   Plateau Set/Insp. Hold (sec) 0   Trigger Sensitivity Flow/I-Trigger 3 L/min   Patient Ventilator Parameters   Resp Rate Total 26 br/min   Peak Airway Pressure 21 cmH20   Mean Airway Pressure 12 cmH20   Plateau Pressure 0 cmH20   Exhaled Vt 389 mL   Total Ve 9.91 L/m   I:E Ratio Measured  1:2.60   Conventional Ventilator Alarms   Alarms On Y   Resp Rate High Alarm 50 br/min   Press High Alarm 60 cmH2O   Apnea Rate 24   Apnea Volume (mL) 0 mL   Apnea Oxygen Concentration  100   Apnea Flow Rate (L/min) 60   T Apnea 25 sec(s)   Ready to Wean/Extubation Screen   FIO2<=50 (chart decimal) (!) 0.7   MV<16L (chart vol.) 9.91   PEEP <=8 (chart #) (!) 10   Ready to Wean Parameters   F/VT Ratio<105 (RSBI) (!) 69.41

## 2023-07-27 NOTE — PROGRESS NOTES
Progress Note  Infectious Disease    Reason for Consult:  Hypothermia, sepsis    HPI: Genna Felix is a 62 y.o. female resident of [facility], known to ID service from prior visits, w PMHx of MAICO 2020, vegetative state, vent dependent, tracheostomy, PEG, PE, HTN, GERD, anemia, osteopenia, acute cholecystitis status post IR drainage in January '22, right arm infection in August '22, MDRs infections like Pseudomonas , Acinetobacter , MRSA, Proteus mirabilis ESBL etc.    She has multiple hospitalizations.   Most recent hospitalization was in the beginning of July.  She had x2 Proteus mirabilis ESBL in urine, Serratia marcescens in sputum, and blood cultures had coag-negative staph on 07/06/2023.  Patient completed treatment with meropenem.    This time, patient is sent to ED because of decreased oxygenation, pulse ox around 87%.  CTA ruled out PE, but found interstitial opacities septal thickening and consolidation of right lower lobe.    She is hypothermic, which is her usual sign of sepsis/infection, needing a Beth Hugger.  She is admitted in ICU, intubated through tracheostomy, FiO2 of 70%, receiving vancomycin and meropenem.  Secretions are pale and yellow.  Sputum cultures are not sent yet, I just did.  Procalcitonin normal at 0.13.  ,  which is slightly higher than usual, blood cultures NGTD    7/24 ([place]): Interim reviewed, discussed with Dr Mccormack, patient seen and examined at bedside, remains trach to vent, FiO2 70%, white thick secretions. Micro reviewed, MRSA nasal negative, blood cultures x 2 no growth to date pending final. Respiratory culture to be collected. UA yesterday with pyuria, pending culture.     7/25: Interim reviewed, patient seen and examined at bedside, remain on FiO2 70%. Hemodynamically stable, afebrile, Labs reviewed,  WBC stable, no left shift, H/H 7.8/26.3, plt: 193. Creatinine 0.4, mild transaminits. Vanco levels supratherapeutic, discontinued. Micro reviewed,  respiratory culture with presumptive Pseudomonas, pending final.     7/26: interim reviewed, patient seen and examined at bedside, remains on FiO2 70%, looks in mild respiratory distress. Micro updated, /MDRO Pseudomonas aeruginosa, resistant to Zosyn as well (SOPHIA >16). Tachycardic, afebrile.     7/27: interim reviewed, patient seen and examined at bedside, remains on fiO2 70%, parameters to be adjusted, she looks comfortable today, hemodynamically stable, afebrile. Labs reviewed, stable.     Antibiotics (From admission, onward)      Start     Stop Route Frequency Ordered    07/26/23 0930  cefTAZidime-avibactam (AVYCAZ) 2.5 g in dextrose 5 % (D5W) 100 mL         -- IV Every 8 hours (non-standard times) 07/26/23 0817    07/23/23 0900  mupirocin 2 % ointment         07/28 0859 Nasl 2 times daily 07/23/23 0032          Antifungals (From admission, onward)      Start     Stop Route Frequency Ordered    07/23/23 0900  miconazole 2 % cream         -- Top 2 times daily 07/23/23 0227            Review of patient's allergies indicates:  No Known Allergies  Past Medical History:   Diagnosis Date    Anoxic brain damage 07/2020    Bedbound 07/2020    Cardiac arrest 07/2020    Cirrhosis     GERD (gastroesophageal reflux disease)     Hemangioma of intra-abdominal structure     Hypertension     Nursing home resident     Protein calorie malnutrition     Pulmonary embolus     Respiratory distress     S/P percutaneous endoscopic gastrostomy (PEG) tube placement 07/2020    Total self-care deficit 07/2020    Tracheostomy dependence     7/2020     Past Surgical History:   Procedure Laterality Date    PEG W/TRACHEOSTOMY PLACEMENT  07/27/2020    SKIN GRAFT      buttocks     Social History     Socioeconomic History    Marital status:    Tobacco Use    Smoking status: Never    Smokeless tobacco: Never   Substance and Sexual Activity    Alcohol use: Not Currently    Drug use: Not Currently    Sexual activity: Not Currently      Family History   Problem Relation Age of Onset    Hypertension Mother     No Known Problems Father          EXAM & DIAGNOSTICS REVIEWED:   Vitals:     Temp:  [97 °F (36.1 °C)-98.4 °F (36.9 °C)]   Temp: 97 °F (36.1 °C) (07/27/23 0400)  Pulse: 92 (07/27/23 0929)  Resp: (!) 8 (07/27/23 0929)  BP: 106/65 (07/27/23 0701)  SpO2: (!) 92 % (07/27/23 0929)    Intake/Output Summary (Last 24 hours) at 7/27/2023 1000  Last data filed at 7/27/2023 0630  Gross per 24 hour   Intake 1429.6 ml   Output 1950 ml   Net -520.4 ml     Vent Mode: A/C  Oxygen Concentration (%):  [70] 70  Resp Rate Total:  [25 br/min-40 br/min] 38 br/min  Vt Set:  [360 mL] 360 mL  PEEP/CPAP:  [10 cmH20] 10 cmH20  Pressure Support:  [0 cmH20] 0 cmH20  Mean Airway Pressure:  [12 stP69-62 cmH20] 19 cmH20     General:  Frail elderly lady, laying in bed, looks more comfortable today, trach to vent FiO2 70%   Eyes:  Anicteric, L eye with hyperemia   ENT:  Dry lips, scattered amount of thick secretions  Neck:  Supple  Lungs: R base rhonchi  Heart:  S1/S2+, regular rhythm   Abd:  Soft, hypoactive bowel sounds, no pain, PEG in place  :  Hamilton, urine yellow  Musc:  Joints without effusion, swelling, erythema, synovitis; positive for muscle wasting.   Skin:  See pictures.  Positive for dry skin throughout.  No palmar or plantar lesions. No subungual petechiae  Neuro:        Gaze  towards the right upper side  Psych    Lymphatic:       Extrem: No edema, erythema, phlebitis, cellulitis, warm and well perfused  VAD:  Peripheral IV     Midline L 7/7     Isolation:  Contact isolation/[place] resident  Wound:         Lines/Tubes/Drains:    General Labs reviewed:  Recent Labs   Lab 07/25/23  0324 07/26/23  0332 07/26/23  0732 07/27/23  0311   WBC 8.71 10.80  --  8.06   HGB 7.8* 9.5*  --  8.7*   HCT 26.3* 33.9* 32* 30.5*    219  --  200       Recent Labs   Lab 07/25/23  0324 07/26/23  0332 07/27/23  0311    140 143   K 3.6 4.0 3.9   * 109 107   CO2 24  24 27   BUN 21* 14 19   CREATININE 0.4* 0.4* 0.3*   CALCIUM 8.6* 9.1 8.8   PROT 8.6* 9.3* 8.3   BILITOT 1.5* 1.9* 1.6*   ALKPHOS 148* 172* 143*   ALT 47* 57* 40   AST 47* 48* 27     No results for input(s): CRP in the last 168 hours.    Estimated Creatinine Clearance: 202.7 mL/min (A) (based on SCr of 0.3 mg/dL (L)).      Micro:  Microbiology Results (last 7 days)       Procedure Component Value Units Date/Time    Culture, ID (Consult) [[id number]] Collected: 07/24/23 1148    Order Status: No result Specimen: Respiratory from Sputum Updated: 07/27/23 0937    Blood culture x two cultures. Draw prior to antibiotics. [[id number]] Collected: 07/22/23 2044    Order Status: Completed Specimen: Blood Updated: 07/26/23 2232     Blood Culture, Routine No Growth to date      No Growth to date      No Growth to date      No Growth to date      No Growth to date    Narrative:      Aerobic and anaerobic  Collection has been rescheduled by 2MB at 07/22/2023 19:43 Reason: Amairani liz said she will call when she wants cultures drawn  Collection has been rescheduled by 2MB at 07/22/2023 19:43 Reason: Amairani liz said she will call when she wants cultures drawn    Blood culture x two cultures. Draw prior to antibiotics. [[id number]] Collected: 07/22/23 2050    Order Status: Completed Specimen: Blood Updated: 07/26/23 2232     Blood Culture, Routine No Growth to date      No Growth to date      No Growth to date      No Growth to date      No Growth to date    Narrative:      Aerobic and anaerobic  Collection has been rescheduled by 2MB at 07/22/2023 19:43 Reason: Amairani liz said she will call when she wants cultures drawn  Collection has been rescheduled by 2MB at 07/22/2023 19:43 Reason: Amairani liz said she will call when she wants cultures drawn    Culture, Respiratory with Gram Stain [[id number]]  (Abnormal)  (Susceptibility) Collected: 07/24/23 1148    Order Status: Completed Specimen: Respiratory from Sputum Updated:  07/26/23 0831     Respiratory Culture PSEUDOMONAS AERUGINOSA   Many  MDRO  Isolate sent out for reference testing       Gram Stain (Respiratory) <10 epithelial cells per low power field.     Gram Stain (Respiratory) Many WBC's     Gram Stain (Respiratory) Moderate Gram negative rods    Urine culture [[id number]] Collected: 07/23/23 1602    Order Status: Completed Specimen: Urine Updated: 07/26/23 0703     Urine Culture, Routine No growth    Narrative:      Specimen Source->Urine    MRSA Screen by PCR [[id number]] Collected: 07/23/23 1610    Order Status: Completed Specimen: Nasopharyngeal Swab from Nasal Updated: 07/23/23 1750     MRSA SCREEN BY PCR Negative             Specimen: Respiratory from Sputum Updated: 07/26/23 0831    Respiratory Culture PSEUDOMONAS AERUGINOSA    Many   MDRO   Isolate sent out for reference testing    Abnormal     Gram Stain (Respiratory) <10 epithelial cells per low power field.    Gram Stain (Respiratory) Many WBC's    Gram Stain (Respiratory) Moderate Gram negative rods   Susceptibility     PSEUDOMONAS AERUGINOSA      CULTURE, RESPIRATORY (Preliminary)     Cefepime 16 mcg/mL Intermediate     Ciprofloxacin >2 mcg/mL Resistant     Gentamicin >8 mcg/mL Resistant     Levofloxacin >4 mcg/mL Resistant     Meropenem >8 mcg/mL Resistant     Piperacillin/Tazo 64 mcg/mL Sensitive     Tobramycin >8 mcg/mL Resistant              Imaging Reviewed:  CXR latest: Improvement of the patchy alveolar opacities in the right lung with stable mild increased interstitial markings suggestive of resolving edema or pneumonia.    Chest x-ray 07/22/2023.      Slight increasing infiltrates in the lungs.    CT 07/22/2023.     1.  No acute aortic abnormality or pulmonary embolism.  2.  Bilateral interstitial opacities with interlobular septal thickening and consolidation in the right lower lobe.  Imaging features can be seen with COVID pneumonia, though are nonspecific and can occur with a variety of  infectious and noninfectious processes. PneInd.  3.  Mediastinal and bihilar lymphadenopathy.  4.  Small bilateral pleural effusions.  5.  No acute abdominal or pelvic abnormality.  6.  Splenomegaly.  7.  Hepatic steatosis.      Cardiology:    IMPRESSION & PLAN     Sepsis + hypothermia resolved, secondary to VAP, FiO2 70%  Respiratory culture Pseudomonas,  - Zosyn resistant as well, SOPHIA >16    MRSA nasal swab not detected   Urine culture no growth to date, pending final   Blood cultures x 2 no growth to date pending final     2. Transaminits, improved    3. Resident of [facility], known to ID service from prior visits, w/ PMHx of MAICO 2020, vegetative state, vent dependent, tracheostomy, PEG, PE, HTN, GERD, anemia, osteopenia, acute cholecystitis status post IR drainage in January '22, right arm infection in August '22, MDRs infections like Pseudomonas , Acinetobacter , MRSA, Proteus mirabilis ESBL etc.Splenomegaly, hepato steatosis,      Recommendations:  Avycaz 2.5g IV q8h   Sensitivities for Avycaz and Zerbaxa requested   Monitor O2 sats  Trend LFTs  Aspiration precautions     D/w ICU nursing, Dr Ritchie    Medical Decision Making during this encounter was  [_] Low Complexity  [_] Moderate Complexity  [ xxx ] High Complexity

## 2023-07-27 NOTE — PLAN OF CARE
Patient with SVT recurrence with spontaneous resolution. Will order scheduled metoprolol tartrate 50 Q6H via G tube.

## 2023-07-28 PROBLEM — I47.10 PAROXYSMAL SVT (SUPRAVENTRICULAR TACHYCARDIA): Status: ACTIVE | Noted: 2023-07-28

## 2023-07-28 PROBLEM — J95.03 TRACHEOSTOMY MALFUNCTION: Status: ACTIVE | Noted: 2023-07-28

## 2023-07-28 LAB
ALBUMIN SERPL BCP-MCNC: 2.9 G/DL (ref 3.5–5.2)
ALLENS TEST: ABNORMAL
ALP SERPL-CCNC: 134 U/L (ref 55–135)
ALT SERPL W/O P-5'-P-CCNC: 31 U/L (ref 10–44)
ANION GAP SERPL CALC-SCNC: 3 MMOL/L (ref 8–16)
AST SERPL-CCNC: 18 U/L (ref 10–40)
BASOPHILS # BLD AUTO: 0.01 K/UL (ref 0–0.2)
BASOPHILS NFR BLD: 0.1 % (ref 0–1.9)
BILIRUB SERPL-MCNC: 1.4 MG/DL (ref 0.1–1)
BUN SERPL-MCNC: 20 MG/DL (ref 6–20)
CALCIUM SERPL-MCNC: 8.9 MG/DL (ref 8.7–10.5)
CHLORIDE SERPL-SCNC: 107 MMOL/L (ref 95–110)
CO2 SERPL-SCNC: 31 MMOL/L (ref 23–29)
CREAT SERPL-MCNC: 0.4 MG/DL (ref 0.5–1.4)
D DIMER PPP IA.FEU-MCNC: 2.29 MG/L FEU
DELSYS: ABNORMAL
DIFFERENTIAL METHOD: ABNORMAL
EOSINOPHIL # BLD AUTO: 0.6 K/UL (ref 0–0.5)
EOSINOPHIL NFR BLD: 6.5 % (ref 0–8)
ERYTHROCYTE [DISTWIDTH] IN BLOOD BY AUTOMATED COUNT: 22.2 % (ref 11.5–14.5)
ERYTHROCYTE [SEDIMENTATION RATE] IN BLOOD BY WESTERGREN METHOD: 24 MM/H
EST. GFR  (NO RACE VARIABLE): >60 ML/MIN/1.73 M^2
FIO2: 70
GLUCOSE SERPL-MCNC: 100 MG/DL (ref 70–110)
GLUCOSE SERPL-MCNC: 95 MG/DL (ref 70–110)
HCO3 UR-SCNC: 32.3 MMOL/L (ref 24–28)
HCT VFR BLD AUTO: 29.3 % (ref 37–48.5)
HCT VFR BLD CALC: 28 %PCV (ref 36–54)
HGB BLD-MCNC: 8.3 G/DL (ref 12–16)
IMM GRANULOCYTES # BLD AUTO: 0.02 K/UL (ref 0–0.04)
IMM GRANULOCYTES NFR BLD AUTO: 0.2 % (ref 0–0.5)
LYMPHOCYTES # BLD AUTO: 1.5 K/UL (ref 1–4.8)
LYMPHOCYTES NFR BLD: 17.3 % (ref 18–48)
MAGNESIUM SERPL-MCNC: 1.8 MG/DL (ref 1.6–2.6)
MCH RBC QN AUTO: 26 PG (ref 27–31)
MCHC RBC AUTO-ENTMCNC: 28.3 G/DL (ref 32–36)
MCV RBC AUTO: 92 FL (ref 82–98)
MODE: ABNORMAL
MONOCYTES # BLD AUTO: 0.6 K/UL (ref 0.3–1)
MONOCYTES NFR BLD: 6.8 % (ref 4–15)
NEUTROPHILS # BLD AUTO: 6 K/UL (ref 1.8–7.7)
NEUTROPHILS NFR BLD: 69.1 % (ref 38–73)
NRBC BLD-RTO: 0 /100 WBC
PCO2 BLDA: 49.3 MMHG (ref 35–45)
PEEP: 10
PH SMN: 7.42 [PH] (ref 7.35–7.45)
PHOSPHATE SERPL-MCNC: 2.9 MG/DL (ref 2.7–4.5)
PLATELET # BLD AUTO: 220 K/UL (ref 150–450)
PMV BLD AUTO: 12.4 FL (ref 9.2–12.9)
PO2 BLDA: 145 MMHG (ref 80–100)
POC BE: 8 MMOL/L
POC IONIZED CALCIUM: 1.22 MMOL/L (ref 1.06–1.42)
POC SATURATED O2: 99 % (ref 95–100)
POC TCO2: 34 MMOL/L (ref 23–27)
POTASSIUM BLD-SCNC: 4 MMOL/L (ref 3.5–5.1)
POTASSIUM SERPL-SCNC: 3.7 MMOL/L (ref 3.5–5.1)
PROT SERPL-MCNC: 8.2 G/DL (ref 6–8.4)
RBC # BLD AUTO: 3.19 M/UL (ref 4–5.4)
SAMPLE: ABNORMAL
SITE: ABNORMAL
SODIUM BLD-SCNC: 141 MMOL/L (ref 136–145)
SODIUM SERPL-SCNC: 141 MMOL/L (ref 136–145)
VT: 360
WBC # BLD AUTO: 8.67 K/UL (ref 3.9–12.7)

## 2023-07-28 PROCEDURE — 99291 PR CRITICAL CARE, E/M 30-74 MINUTES: ICD-10-PCS | Mod: ,,, | Performed by: INTERNAL MEDICINE

## 2023-07-28 PROCEDURE — 99900026 HC AIRWAY MAINTENANCE (STAT)

## 2023-07-28 PROCEDURE — 25500020 PHARM REV CODE 255: Performed by: STUDENT IN AN ORGANIZED HEALTH CARE EDUCATION/TRAINING PROGRAM

## 2023-07-28 PROCEDURE — 99291 CRITICAL CARE FIRST HOUR: CPT | Mod: ,,, | Performed by: INTERNAL MEDICINE

## 2023-07-28 PROCEDURE — 94640 AIRWAY INHALATION TREATMENT: CPT

## 2023-07-28 PROCEDURE — 82803 BLOOD GASES ANY COMBINATION: CPT

## 2023-07-28 PROCEDURE — 27000221 HC OXYGEN, UP TO 24 HOURS

## 2023-07-28 PROCEDURE — 82330 ASSAY OF CALCIUM: CPT

## 2023-07-28 PROCEDURE — 99900035 HC TECH TIME PER 15 MIN (STAT)

## 2023-07-28 PROCEDURE — 36415 COLL VENOUS BLD VENIPUNCTURE: CPT | Performed by: FAMILY MEDICINE

## 2023-07-28 PROCEDURE — 20000000 HC ICU ROOM

## 2023-07-28 PROCEDURE — 94760 N-INVAS EAR/PLS OXIMETRY 1: CPT

## 2023-07-28 PROCEDURE — 25000242 PHARM REV CODE 250 ALT 637 W/ HCPCS: Performed by: INTERNAL MEDICINE

## 2023-07-28 PROCEDURE — 84100 ASSAY OF PHOSPHORUS: CPT | Performed by: FAMILY MEDICINE

## 2023-07-28 PROCEDURE — 84295 ASSAY OF SERUM SODIUM: CPT

## 2023-07-28 PROCEDURE — 63600175 PHARM REV CODE 636 W HCPCS: Mod: JZ,JG | Performed by: STUDENT IN AN ORGANIZED HEALTH CARE EDUCATION/TRAINING PROGRAM

## 2023-07-28 PROCEDURE — 63600175 PHARM REV CODE 636 W HCPCS: Performed by: FAMILY MEDICINE

## 2023-07-28 PROCEDURE — 99223 PR INITIAL HOSPITAL CARE,LEVL III: ICD-10-PCS | Mod: ,,, | Performed by: INTERNAL MEDICINE

## 2023-07-28 PROCEDURE — 85025 COMPLETE CBC W/AUTO DIFF WBC: CPT | Performed by: FAMILY MEDICINE

## 2023-07-28 PROCEDURE — 25000003 PHARM REV CODE 250: Performed by: FAMILY MEDICINE

## 2023-07-28 PROCEDURE — 94799 UNLISTED PULMONARY SVC/PX: CPT

## 2023-07-28 PROCEDURE — 85014 HEMATOCRIT: CPT

## 2023-07-28 PROCEDURE — 99900031 HC PATIENT EDUCATION (STAT)

## 2023-07-28 PROCEDURE — 84132 ASSAY OF SERUM POTASSIUM: CPT

## 2023-07-28 PROCEDURE — 94761 N-INVAS EAR/PLS OXIMETRY MLT: CPT

## 2023-07-28 PROCEDURE — 36600 WITHDRAWAL OF ARTERIAL BLOOD: CPT

## 2023-07-28 PROCEDURE — 94003 VENT MGMT INPAT SUBQ DAY: CPT

## 2023-07-28 PROCEDURE — 25000003 PHARM REV CODE 250: Performed by: STUDENT IN AN ORGANIZED HEALTH CARE EDUCATION/TRAINING PROGRAM

## 2023-07-28 PROCEDURE — 99223 1ST HOSP IP/OBS HIGH 75: CPT | Mod: ,,, | Performed by: INTERNAL MEDICINE

## 2023-07-28 PROCEDURE — 80053 COMPREHEN METABOLIC PANEL: CPT | Performed by: FAMILY MEDICINE

## 2023-07-28 PROCEDURE — 25000003 PHARM REV CODE 250: Performed by: INTERNAL MEDICINE

## 2023-07-28 PROCEDURE — 99233 PR SUBSEQUENT HOSPITAL CARE,LEVL III: ICD-10-PCS | Mod: ,,, | Performed by: STUDENT IN AN ORGANIZED HEALTH CARE EDUCATION/TRAINING PROGRAM

## 2023-07-28 PROCEDURE — 83735 ASSAY OF MAGNESIUM: CPT | Performed by: FAMILY MEDICINE

## 2023-07-28 PROCEDURE — 99233 SBSQ HOSP IP/OBS HIGH 50: CPT | Mod: ,,, | Performed by: STUDENT IN AN ORGANIZED HEALTH CARE EDUCATION/TRAINING PROGRAM

## 2023-07-28 PROCEDURE — 85379 FIBRIN DEGRADATION QUANT: CPT | Performed by: STUDENT IN AN ORGANIZED HEALTH CARE EDUCATION/TRAINING PROGRAM

## 2023-07-28 RX ORDER — LEVALBUTEROL INHALATION SOLUTION 1.25 MG/3ML
1.25 SOLUTION RESPIRATORY (INHALATION) EVERY 8 HOURS
Status: DISCONTINUED | OUTPATIENT
Start: 2023-07-28 | End: 2023-08-01 | Stop reason: HOSPADM

## 2023-07-28 RX ADMIN — MIDODRINE HYDROCHLORIDE 10 MG: 2.5 TABLET ORAL at 04:07

## 2023-07-28 RX ADMIN — LEVALBUTEROL HYDROCHLORIDE 1.25 MG: 1.25 SOLUTION RESPIRATORY (INHALATION) at 11:07

## 2023-07-28 RX ADMIN — METOPROLOL TARTRATE 50 MG: 50 TABLET, FILM COATED ORAL at 05:07

## 2023-07-28 RX ADMIN — CARBOXYMETHYLCELLULOSE SODIUM 1 DROP: 5 SOLUTION/ DROPS OPHTHALMIC at 09:07

## 2023-07-28 RX ADMIN — METOPROLOL TARTRATE 50 MG: 50 TABLET, FILM COATED ORAL at 12:07

## 2023-07-28 RX ADMIN — MICONAZOLE NITRATE: 20 CREAM TOPICAL at 09:07

## 2023-07-28 RX ADMIN — Medication 4000 UNITS: at 01:07

## 2023-07-28 RX ADMIN — MAGNESIUM SULFATE HEPTAHYDRATE 2 G: 40 INJECTION, SOLUTION INTRAVENOUS at 10:07

## 2023-07-28 RX ADMIN — CARBOXYMETHYLCELLULOSE SODIUM 1 DROP: 5 SOLUTION/ DROPS OPHTHALMIC at 01:07

## 2023-07-28 RX ADMIN — DEXTROSE MONOHYDRATE 2.5 G: 50 INJECTION, SOLUTION INTRAVENOUS at 01:07

## 2023-07-28 RX ADMIN — MIDODRINE HYDROCHLORIDE 10 MG: 2.5 TABLET ORAL at 11:07

## 2023-07-28 RX ADMIN — ENOXAPARIN SODIUM 40 MG: 100 INJECTION SUBCUTANEOUS at 04:07

## 2023-07-28 RX ADMIN — METOPROLOL TARTRATE 50 MG: 50 TABLET, FILM COATED ORAL at 11:07

## 2023-07-28 RX ADMIN — IOHEXOL 100 ML: 350 INJECTION, SOLUTION INTRAVENOUS at 07:07

## 2023-07-28 RX ADMIN — CARBOXYMETHYLCELLULOSE SODIUM 1 DROP: 5 SOLUTION/ DROPS OPHTHALMIC at 10:07

## 2023-07-28 RX ADMIN — MIDODRINE HYDROCHLORIDE 10 MG: 2.5 TABLET ORAL at 06:07

## 2023-07-28 RX ADMIN — CARBOXYMETHYLCELLULOSE SODIUM 1 DROP: 5 SOLUTION/ DROPS OPHTHALMIC at 05:07

## 2023-07-28 RX ADMIN — LEVALBUTEROL HYDROCHLORIDE 1.25 MG: 1.25 SOLUTION RESPIRATORY (INHALATION) at 03:07

## 2023-07-28 RX ADMIN — DEXTROSE MONOHYDRATE 2.5 G: 50 INJECTION, SOLUTION INTRAVENOUS at 05:07

## 2023-07-28 RX ADMIN — DEXTROSE MONOHYDRATE 2.5 G: 50 INJECTION, SOLUTION INTRAVENOUS at 09:07

## 2023-07-28 RX ADMIN — POTASSIUM CHLORIDE 40 MEQ: 7.46 INJECTION, SOLUTION INTRAVENOUS at 05:07

## 2023-07-28 NOTE — PROGRESS NOTES
"[facility] Medicine  Progress Note    Patient Name: Genna Felix  MRN: [id number]  Patient Class: IP- Inpatient   Admission Date: 7/22/2023  Length of Stay: 6 days  Attending Physician: Kirill Garcia MD  Primary Care Provider: Primary Doctor No        Subjective:     Principal Problem:Acute on chronic respiratory failure        HPI:  Patient presents to ER due to respiratory distress.     Spoke with sister, Amelia. States that she was told her oxygen was dropping low at Ms. Felix's nursing home, [facility]. Was transferred to the ER. Called [facility] for more info and spoke with Ms. Yousif (a nurse who got sign out from the day team about patient). Was told that Respiratory was trying to improve her sats throughout the day however it never improved above 87%. Patient ws then transferred to ER. Staff noticed urine output was dirty with sediment so u/a and urine culture ordered.      Overview/Hospital Course:  Genna Felix is a 62 year old female with a past medical history of anoxic brain injury s/p G tube and tracheostomy, PE, GERD, HTN, hepatic steatosis, obesity, and anemia who presented with severe sepsis with acute respiratory failure requiring mechanical ventilation. Respiratory cultures show  Pseudomonas. ID has been consulted and started Avycaz. Pulmonary has also been consulted for ventilator management. Her course has been complicated by tracheostomy cuff leak; Pulmonary replaced the cuff 7/27. She also developed SVT for which scheduled metoprolol has been ordered. Cardiology has been consulted.       Interval History: see "Hospital Course"    Review of Systems   Unable to perform ROS: Patient nonverbal   Objective:     Vital Signs (Most Recent):  Temp: 96.7 °F (35.9 °C) (07/28/23 0943)  Pulse: 78 (07/28/23 1107)  Resp: (!) 25 (07/28/23 1107)  BP: (!) 97/53 (07/28/23 0901)  SpO2: 95 % (07/28/23 1107) Vital Signs (24h Range):  Temp:  [95.5 °F (35.3 °C)-97.9 °F (36.6 °C)] " 96.7 °F (35.9 °C)  Pulse:  [] 78  Resp:  [2-36] 25  SpO2:  [89 %-100 %] 95 %  BP: ()/() 97/53     Weight: 92.7 kg (204 lb 5.9 oz)  Body mass index is 39.91 kg/m².    Intake/Output Summary (Last 24 hours) at 7/28/2023 1136  Last data filed at 7/28/2023 0943  Gross per 24 hour   Intake 1308.34 ml   Output 915 ml   Net 393.34 ml         Physical Exam  Vitals and nursing note reviewed.   Constitutional:       General: She is not in acute distress.     Appearance: She is ill-appearing.   HENT:      Head: Normocephalic and atraumatic.      Right Ear: External ear normal.      Left Ear: External ear normal.      Nose: Nose normal.      Mouth/Throat:      Mouth: Mucous membranes are moist.      Pharynx: Oropharynx is clear.   Eyes:      Extraocular Movements: Extraocular movements intact.      Conjunctiva/sclera: Conjunctivae normal.   Neck:      Comments: Tracheostomy.  Cardiovascular:      Rate and Rhythm: Normal rate and regular rhythm.      Pulses: Normal pulses.      Heart sounds: Normal heart sounds.   Pulmonary:      Breath sounds: Normal breath sounds.      Comments: MV.  Abdominal:      General: Bowel sounds are normal.      Palpations: Abdomen is soft.      Tenderness: There is no abdominal tenderness.      Comments: G tube.   Genitourinary:     Comments: Hamilton.  Musculoskeletal:         General: Normal range of motion.   Skin:     General: Skin is warm and dry.   Neurological:      Mental Status: Mental status is at baseline.           Significant Labs: All pertinent labs within the past 24 hours have been reviewed.    Significant Imaging: I have reviewed all pertinent imaging results/findings within the past 24 hours.      Assessment/Plan:      * Acute on chronic respiratory failure  -Continue Avycaz, end date 8/1  -CXR and ABG PRN  -Pulmonary consulted    Tracheostomy malfunction  -Pulmonary managing      Paroxysmal SVT (supraventricular tachycardia)  -Telemetry  -Metoprolol 50  Q6H  -Cardiology consulted      Hepatic steatosis  Chronic. Stable.  -Trend LFTs      Obesity (BMI 30-39.9)  Body mass index is 39.91 kg/m². Morbid obesity complicates all aspects of disease management from diagnostic modalities to treatment.         Pressure injury of buttock, stage 2  -Wound Care consulted      Pneumonia of both lungs due to infectious organism  -ID consulted  -Continue Avycaz; end date 8/1      Difficult Hamilton catheter placement        Essential hypertension  -Continue to monitor      PEG (percutaneous endoscopic gastrostomy) status  -Care per Nursing      Tracheostomy dependence  -Care per RT      Persistent vegetative state  -Turn patient q2 hours  -Shower patient daily  -Consult Nutrition for diet recs    Anemia of chronic disease  -Trend Hgb with CBC        VTE Risk Mitigation (From admission, onward)         Ordered     enoxaparin injection 40 mg  Daily         07/23/23 0225     IP VTE HIGH RISK PATIENT  Once         07/23/23 0225     Place sequential compression device  Until discontinued         07/23/23 0225                Discharge Planning   [place]: 7/31/2023     Code Status: Full Code   Is the patient medically ready for discharge?:     Reason for patient still in hospital (select all that apply): Patient trending condition, Treatment and Consult recommendations  Discharge Plan A: Return to nursing home                  Kirill Garcia MD  Department of Hospital Medicine   [facility]

## 2023-07-28 NOTE — SUBJECTIVE & OBJECTIVE
"Interval History: see "Hospital Course"    Review of Systems   Unable to perform ROS: Patient nonverbal   Objective:     Vital Signs (Most Recent):  Temp: 96.7 °F (35.9 °C) (07/28/23 0943)  Pulse: 78 (07/28/23 1107)  Resp: (!) 25 (07/28/23 1107)  BP: (!) 97/53 (07/28/23 0901)  SpO2: 95 % (07/28/23 1107) Vital Signs (24h Range):  Temp:  [95.5 °F (35.3 °C)-97.9 °F (36.6 °C)] 96.7 °F (35.9 °C)  Pulse:  [] 78  Resp:  [2-36] 25  SpO2:  [89 %-100 %] 95 %  BP: ()/() 97/53     Weight: 92.7 kg (204 lb 5.9 oz)  Body mass index is 39.91 kg/m².    Intake/Output Summary (Last 24 hours) at 7/28/2023 1136  Last data filed at 7/28/2023 0943  Gross per 24 hour   Intake 1308.34 ml   Output 915 ml   Net 393.34 ml         Physical Exam  Vitals and nursing note reviewed.   Constitutional:       General: She is not in acute distress.     Appearance: She is ill-appearing.   HENT:      Head: Normocephalic and atraumatic.      Right Ear: External ear normal.      Left Ear: External ear normal.      Nose: Nose normal.      Mouth/Throat:      Mouth: Mucous membranes are moist.      Pharynx: Oropharynx is clear.   Eyes:      Extraocular Movements: Extraocular movements intact.      Conjunctiva/sclera: Conjunctivae normal.   Neck:      Comments: Tracheostomy.  Cardiovascular:      Rate and Rhythm: Normal rate and regular rhythm.      Pulses: Normal pulses.      Heart sounds: Normal heart sounds.   Pulmonary:      Breath sounds: Normal breath sounds.      Comments: MV.  Abdominal:      General: Bowel sounds are normal.      Palpations: Abdomen is soft.      Tenderness: There is no abdominal tenderness.      Comments: G tube.   Genitourinary:     Comments: Hamilton.  Musculoskeletal:         General: Normal range of motion.   Skin:     General: Skin is warm and dry.   Neurological:      Mental Status: Mental status is at baseline.           Significant Labs: All pertinent labs within the past 24 hours have been " reviewed.    Significant Imaging: I have reviewed all pertinent imaging results/findings within the past 24 hours.

## 2023-07-28 NOTE — CARE UPDATE
Pt's trach still seems to have a leak. Dr Ritchie is aware and said no further intervention is required.   Pt. Has PIP of 45-50.

## 2023-07-28 NOTE — PROGRESS NOTES
[facility]  Adult Nutrition   Progress Note (Nutrition Support Management)    SUMMARY      Recommendations:   1. Continue current TF of Glucerna 1.5 at the goal rate of 30 mls/h to provide 1080 kcals, 59 g protein and 546 mls water.  2. Continue FWF's of 30 mls water every 4 hours to clear tube and meet fluid needs.   3. RD will continue to monitor rate/tolerance, weights, labs, etc. and make recommendations prn.       Goals:   1) Nutrition provision to meet 100% of pts energy and protein needs.   2) Pt to tolerate TF at the goal rate.    Dietitian Rounds Brief  F/U Nutrition Note: Pt continue to tolerate TF of Glucerna 1.5 at the goa rate of 30 mls/h. Her LBM was today and will continue to follow prn.    Diet order: NPO      TF: Glucerna 1.5  Rate: 30 mls/h  Calories: 1080 kcals  Protein: 59 g  Fluid: 546 mls  Water flushes: 30 mls every 4 hours    % Intake of Estimated Energy Needs: 75 - 100 %  % Meal Intake: NPO    Estimated/Assessed Needs  Weight Used For Calorie Calculations: 89 kg (196 lb 3.4 oz)  Energy Calorie Requirements (kcal): 979-1246 kcals/day (11-14 kcals/kg ABW)  Energy Need Method: Kcal/kg  Protein Requirements: 68-90g/day (1.5-2 g/kg IBW)  Weight Used For Protein Calculations: 45 kg (99 lb 3.3 oz)     Estimated Fluid Requirement Method: RDA Method  RDA Method (mL): 979       Weight History:  Wt Readings from Last 5 Encounters:   07/27/23 92.7 kg (204 lb 5.9 oz)   07/06/23 88.6 kg (195 lb 5.2 oz)   06/12/23 77.1 kg (170 lb)   10/20/22 77.1 kg (170 lb)   08/26/22 77.2 kg (170 lb 3.1 oz)        Reason for Assessment  Reason For Assessment: consult, new tube feeding  Diagnosis: other (see comments) (Pneumonia of both lungs due to infectious organism)  Relevant Medical History: Anemia of chronic disease, Persistent vegetative state, Essential hypertension, Ventilator dependence, Urine finding, Hypoxia  Interdisciplinary Rounds: attended    Medications:Pertinent Medications  Reviewed  Scheduled Meds:   acetylcysteine 200 mg/ml (20%)  2 mL Nebulization TID WAKE    carboxymethylcellulose  1 drop Both Eyes QID    ceftazidime-avibactam (AVYCAZ) IVPB  2.5 g Intravenous Q8H    enoxparin  40 mg Subcutaneous Daily    ergocalciferol  4,000 Units Per G Tube Daily    levalbuterol  1.25 mg Nebulization Q8H    metoprolol tartrate  50 mg Per G Tube Q6H    miconazole   Topical (Top) BID    midodrine  10 mg Per G Tube TID AC     Continuous Infusions:  PRN Meds:.calcium gluconate IVPB, calcium gluconate IVPB, calcium gluconate IVPB, magnesium sulfate IVPB, magnesium sulfate IVPB, ondansetron, polyethylene glycol, potassium chloride **AND** potassium chloride **AND** potassium chloride, sodium chloride 0.9%, sodium phosphate IVPB, sodium phosphate IVPB, sodium phosphate IVPB    Labs: Pertinent Labs Reviewed  Clinical Chemistry:  Recent Labs   Lab 07/26/23  0332 07/27/23 0311 07/28/23  0319    143 141   K 4.0 3.9 3.7    107 107   CO2 24 27 31*    108 95   BUN 14 19 20   CREATININE 0.4* 0.3* 0.4*   CALCIUM 9.1 8.8 8.9   PROT 9.3* 8.3 8.2   ALBUMIN 3.4* 2.9* 2.9*   BILITOT 1.9* 1.6* 1.4*   ALKPHOS 172* 143* 134   AST 48* 27 18   ALT 57* 40 31   ANIONGAP 7* 9 3*   MG 1.6 1.9 1.8   PHOS 3.5 2.3* 2.9     CBC:   Recent Labs   Lab 07/28/23  0319 07/28/23  0404   WBC 8.67  --    RBC 3.19*  --    HGB 8.3*  --    HCT 29.3* 28*     --    MCV 92  --    MCH 26.0*  --    MCHC 28.3*  --      Cardiac Profile:  Recent Labs   Lab 07/22/23  1934   *     Thyroid & Parathyroid:  Recent Labs   Lab 07/23/23  0315   TSH 1.880       Monitor and Evaluation  Food and Nutrient Intake: enteral nutrition intake  Food and Nutrient Adminstration: enteral and parenteral nutrition administration  Knowledge/Beliefs/Attitudes: food and nutrition knowledge/skill, beliefs and attitudes  Physical Activity and Function: nutrition-related ADLs and IADLs, factors affecting access to physical  activity  Anthropometric Measurements: weight, weight change, body mass index  Biochemical Data, Medical Tests and Procedures: lipid profile, inflammatory profile, glucose/endocrine profile, gastrointestinal profile, electrolyte and renal panel  Nutrition-Focused Physical Findings: overall appearance     Nutrition Risk  Level of Risk/Frequency of Follow-up: high     Nutrition Follow-Up  RD Follow-up?: Yes    Estrella Jo RD 07/28/2023 12:05 PM

## 2023-07-28 NOTE — PROGRESS NOTES
07/28/2023      Admit Date: 7/22/2023  Genna Felix  Pulmonary and Critical Care Inpatient Progress Note    Chief Complaint   Patient presents with    Shortness of Breath     Arrived from [place] [facility] with c/c SOB that started earlier today       History of Present Illness:  Pt is a 63 yo female with hepatic steatosis, PE, GERD, anoxic brain injury dependent on trach/vent and PEG who presented to the ED with desaturation. She was found to have pneumonia and UTI. Pulmonary is consulted for vent management. She is hypothermic requiring warming blanket today. Hx is gathered from chart review. There is no family at bedside.    7/24/23: Minimal urine output. Unable to place Hamilton. Obtaining bladder scan -- unable to quantify, but full of fluid. Re-attempting Hamilton.    7/25/23: Stable.    7/26/23: Respiratory acidosis this morning and patient noted to have some increased respiratory effort. There was an audible leak around the tracheostomy tube, which resolved when I more fully inflated the cuff.    7/27/23: Stable this morning. ABG with mild metabolic alkalosis, likely due to increased bicarbonate which was compensatory for her recent hypercapnia, but she is now tachypneic with a normal PaCO2.    7/28/23: Developed SVT yesterday afternoon with subsequent acute worsening of hypoxia, which improved when heart rate improved.      PFSH:  Past Medical History:   Diagnosis Date    Anoxic brain damage 07/2020    Bedbound 07/2020    Cardiac arrest 07/2020    Cirrhosis     GERD (gastroesophageal reflux disease)     Hemangioma of intra-abdominal structure     Hypertension     Nursing home resident     Protein calorie malnutrition     Pulmonary embolus     Respiratory distress     S/P percutaneous endoscopic gastrostomy (PEG) tube placement 07/2020    Total self-care deficit 07/2020    Tracheostomy dependence     7/2020     Past Surgical History:   Procedure Laterality Date    PEG W/TRACHEOSTOMY PLACEMENT  07/27/2020     SKIN GRAFT      buttocks     Social History     Tobacco Use    Smoking status: Never    Smokeless tobacco: Never   Substance Use Topics    Alcohol use: Not Currently    Drug use: Not Currently     Family History   Problem Relation Age of Onset    Hypertension Mother     No Known Problems Father      Review of patient's allergies indicates:  No Known Allergies    Performance Status:Performance Status:The patient's activity level is bedbound.    Review of Systems:  due to neurologic status/impairments a     Exam:Comprehensive exam done. /61   Pulse 81   Temp (!) 95.8 °F (35.4 °C) (Axillary)   Resp (!) 28   Ht 5' (1.524 m)   Wt 92.7 kg (204 lb 5.9 oz)   SpO2 95%   Breastfeeding No   BMI 39.91 kg/m²       PHYSICAL EXAM  GENERAL:  NAD.  HEENT: EOMI. PERRLA. Opens eyes spontaneously, but no response to confrontation.  NECK: No cervical adenopathy palpated. No JVD or HJR.  HEART: Regular rate and rhythm. No murmur or gallop auscultated.  LUNGS: No crackles or wheezing on anterior auscultation. Audible cuff leak.  ABDOMEN: Bowel sounds present. Soft, non-tender, non-distended.  EXTREMITIES: Normal muscle tone and joint movement, no cyanosis or clubbing.   LYMPHATICS: No adenopathy palpated, no edema.  SKIN: Dry, intact, no lesions.   NEURO: Unresponsive.  PSYCH: Unable to assess.    Imaging Results              CTA Chest Abdomen Pelvis (Final result)  Result time 07/22/23 22:24:45      Final result by Danny Rasmussen DO (07/22/23 22:24:45)                   Narrative:    PROCEDURE:  CT Angiography Chest, Abdomen and Pelvis With Intravenous Contrast    CLINICAL INDICATION:  The patient is 62 years old and is Female; respiratory distress    TECHNIQUE:  Axial computed tomographic angiography images of the chest, abdomen and pelvis with intravenous contrast.  This CT exam was performed using one or more of the following dose reduction techniques:  automated exposure control, adjustment of the mA and/or kV  according to patient size, and/or use of iterative reconstruction technique.  MIP reconstructed images were created and reviewed.    DLP: 955 mGycm    CONTRAST:  100mL of Omnipaque 350 was administered intravenously.    COMPARISON:  Chest radiograph of the same day and CT abdomen and pelvis dated 7/6/2023.    FINDINGS:    VASCULATURE:  AORTA:  No acute findings.  No aortic aneurysm.  No dissection.  PULMONARY ARTERIES:  Unremarkable as visualized.  No pulmonary embolism is identified.  GREAT VESSELS OF AORTIC ARCH:  No acute findings.  No dissection.  No arterial occlusion or significant stenosis.  CELIAC TRUNK AND MESENTERIC ARTERIES:  No acute findings.  No occlusion or significant stenosis.  RENAL ARTERIES:  No acute findings.  No occlusion or significant stenosis.  ILIAC ARTERIES:  No acute findings.  No occlusion or significant stenosis.    CHEST:  LUNGS:  Bilateral interstitial opacities with interlobular septal thickening and consolidation in the right lower lobe.  PLEURAL SPACE:  Small bilateral pleural effusions.  No pneumothorax.  HEART:  Unremarkable.  No cardiomegaly.  No significant pericardial effusion.  MEDIASTINUM:  Mediastinal and bihilar lymphadenopathy.    ABDOMEN:  LIVER:  Hepatic steatosis.  GALLBLADDER AND BILE DUCTS:  Unremarkable.  No calcified stones.  No ductal dilation.  PANCREAS:  Unremarkable.  No ductal dilation.  No mass.  SPLEEN:  Splenomegaly.  ADRENALS:  Unremarkable.  No mass.  KIDNEYS AND URETERS:  Unremarkable.  No hydronephrosis.  No solid mass.  STOMACH AND BOWEL:  Unremarkable.  No obstruction.  No mucosal thickening.    PELVIS:  APPENDIX:  No findings to suggest acute appendicitis.  BLADDER:  Bladder is decompressed.  REPRODUCTIVE:  Prior hysterectomy.    CHEST, ABDOMEN and PELVIS:  INTRAPERITONEAL SPACE:  Unremarkable.  No significant fluid collection.  No free air.  BONES/JOINTS:  Severe heterotopic bone formation arising from the left acetabulum. Milder similar-appearing  finding on the right. Advanced left shoulder degenerative changes.  Diffuse osteopenia. Multilevel degenerative changes.  No acute fracture.  No dislocation.  SOFT TISSUES:  Fat-containing umbilical hernia.  LYMPH NODES:  Unremarkable.  No enlarged lymph nodes.  TUBES, LINES AND DEVICES:  Partially evaluated gastrostomy and tracheostomy tubes.    IMPRESSION:    1.  No acute aortic abnormality or pulmonary embolism.    2.  Bilateral interstitial opacities with interlobular septal thickening and consolidation in the right lower lobe.  Imaging features can be seen with COVID pneumonia, though are nonspecific and can occur with a variety of infectious and noninfectious processes. PneInd.    3.  Mediastinal and bihilar lymphadenopathy.    4.  Small bilateral pleural effusions.    5.  No acute abdominal or pelvic abnormality.    6.  Splenomegaly.    7.  Hepatic steatosis.    Electronically signed by:  Danny Rasmussen DO  7/22/2023 10:24 PM CDT Workstation: [id number]                                     X-Ray Chest AP Portable (Final result)  Result time 07/22/23 19:49:01      Final result by Jayant Junior MD (07/22/23 19:49:01)                   Narrative:    CLINICAL DATA:  Sepsis and shortness of breath    TECHNIQUE:  Views: A single AP view.  Limitations: The images are technically satisfactory.    COMPARISON:  July 8, 2023    FINDINGS:  CARDIOVASCULAR: Normal.    LUNGS AND PLEURA:  1. Infiltrative changes are seen diffusely throughout both lungs. Allowing for technical differences these have increased slightly since last examination.    MEDIASTINAL AND HILAR STRUCTURES: Normal.    OSSEOUS STRUCTURES: Normal.    ADDITIONAL FINDINGS:  1. The tracheostomy tube remains in good position.    IMPRESSION:    1.  Slight increasing infiltrates in the lungs.    This document was created using a voice recognition transcribing system. Incorrect words or phrases may have been missed during proof reading. Please interpret  accordingly or contact the radiologist for clarification if necessary.    Electronically signed by:  Jayant Junior MD  7/22/2023 7:49 PM CDT Workstation: [id number]                                        Labs     Recent Labs   Lab 07/28/23 0319 07/28/23 0404   WBC 8.67  --    HGB 8.3*  --    HCT 29.3* 28*     --      Recent Labs   Lab 07/28/23 0319      K 3.7      CO2 31*   BUN 20   CREATININE 0.4*   GLU 95   CALCIUM 8.9   MG 1.8   PHOS 2.9   AST 18   ALT 31   ALKPHOS 134   BILITOT 1.4*   PROT 8.2   ALBUMIN 2.9*     Recent Labs   Lab 07/28/23  0404   PH 7.424   PCO2 49.3*   PO2 145*   HCO3 32.3*       Microbiology Results (last 7 days)       Procedure Component Value Units Date/Time    Culture, ID (Consult) [[id number]]  (Abnormal) Collected: 07/24/23 1148    Order Status: Completed Specimen: Respiratory from Sputum Updated: 07/28/23 0748     Culture, Identification (consult) PSEUDOMONAS AERUGINOSA   Susceptibility pending      Blood culture x two cultures. Draw prior to antibiotics. [[id number]] Collected: 07/22/23 2044    Order Status: Completed Specimen: Blood Updated: 07/27/23 2232     Blood Culture, Routine No growth after 5 days.    Narrative:      Aerobic and anaerobic  Collection has been rescheduled by 2MB at 07/22/2023 19:43 Reason: Amairani liz said she will call when she wants cultures drawn  Collection has been rescheduled by 2MB at 07/22/2023 19:43 Reason: Amairani liz said she will call when she wants cultures drawn    Blood culture x two cultures. Draw prior to antibiotics. [[id number]] Collected: 07/22/23 2050    Order Status: Completed Specimen: Blood Updated: 07/27/23 2232     Blood Culture, Routine No growth after 5 days.    Narrative:      Aerobic and anaerobic  Collection has been rescheduled by 2MB at 07/22/2023 19:43 Reason: Amairani liz said she will call when she wants cultures drawn  Collection has been rescheduled by 2MB at 07/22/2023 19:43 Reason: Rn   hamilton  shalonda said she will call when she wants cultures drawn    Culture, Respiratory with Gram Stain [[id number]]  (Abnormal)  (Susceptibility) Collected: 07/24/23 1148    Order Status: Completed Specimen: Respiratory from Sputum Updated: 07/26/23 0831     Respiratory Culture PSEUDOMONAS AERUGINOSA   Many  MDRO  Isolate sent out for reference testing       Gram Stain (Respiratory) <10 epithelial cells per low power field.     Gram Stain (Respiratory) Many WBC's     Gram Stain (Respiratory) Moderate Gram negative rods    Urine culture [[id number]] Collected: 07/23/23 1602    Order Status: Completed Specimen: Urine Updated: 07/26/23 0703     Urine Culture, Routine No growth    Narrative:      Specimen Source->Urine    MRSA Screen by PCR [[id number]] Collected: 07/23/23 1610    Order Status: Completed Specimen: Nasopharyngeal Swab from Nasal Updated: 07/23/23 1750     MRSA SCREEN BY PCR Negative          CXR 7/25/23: Dense opacifications along the lateral periphery of the left lung, which could represent a large pneumonia; there are also diffuse bilateral alveolar opacities otherwise. This is similar to 7/21/23, but the left opacities are more prominent.    CXR 7/26/23: Similar to prior with diffuse, hazy, bilateral opacities, R>L.    CXR 7/28/23: Stable bilateral hazy, patchy alveolar opacities, R>L.    Impression and Plan:  Active Hospital Problems    Diagnosis  POA    *Acute on chronic respiratory failure [J96.20]  Yes    Obesity (BMI 30-39.9) [E66.9]  Yes    Hepatic steatosis [K76.0]  Yes    Pressure injury of buttock, stage 2 [L89.302]  Yes    Pneumonia of both lungs due to infectious organism [J18.9]  Yes    Essential hypertension [I10]  Yes    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]  Not Applicable    PEG (percutaneous endoscopic gastrostomy) status [Z93.1]  Not Applicable    Anemia of chronic disease [D63.8]  Yes    Persistent vegetative state [R40.3]  Yes      Resolved Hospital Problems   No resolved problems to display.      Acute on chronic hypoxemic/hypercapneic resp failure  Vegetative state with trach/vent and PEG dependence  Ventilator-associated pneumonia due to /MDRO P. Aeruginosa  #Compensated hypercapnia  UTI, complicated  Normocytic anemia  - continue vent; no weaning  - ABG qAM  - continue antibiotics as per ID  - continue airway clearance regimen  - continue feeds via PEG    #Leak around tracheostomy tube, positional  Trach tube exchanged 7/27 to [facility] 8 XLT, but this did not fix the problem.  - position as needed to minimize air leak  - inflate cuff as needed within safe parameters    #Hypoxemia, improved  Likely due to pneumonia and exacerbated by SVT. Bedside echo 7/27 showed small, collapsible IVC, so fluid overload less likely.  - wean O2 as tolerated for SpO2 90% or greater    #Decreased lung and/or chest wall compliance  Peak and plateau pressures have been elevated (Ppeak 35-50s) on most measurements during this admission. I suspect this is from decreased chest wall compliance from contractures and obesity.  - no acute intervention    #SVT  - agree with metoprolol  - appreciate pending cardiology consultation    Upon my evaluation, this patient had a high probability of imminent or life-threatening deterioration, which required my direct attention, intervention, and personal management.    I have personally provided at least 35 minutes of critical care time exclusive of time spent on separately billable procedures. Over 50% of the time of this encounter was spent in direct care at the bedside. Time includes review of laboratory data, radiology results, discussion with consultants, and monitoring for potential decompensation. Interventions were performed as documented above.    Yonas Ritchie MD  Pulmonary and Critical Care Medicine  Critical access hospital / [facility]  Date of Service: 07/28/2023  9:11 AM

## 2023-07-28 NOTE — CARE UPDATE
Pt saturations continue to drop. Fio2 increased the I went up on the PEEP back to 10. I spoke with Dr. Ritchie and he said he will order a CT of her chest.    07/28/23 1720   PRE-TX-O2   Oxygen Concentration (%) 55   SpO2 (!) 89 %   Pulse 72   Resp (!) 0   Vent Select   Charged w/in last 24h YES   Preset Conventional Ventilator Settings   Ventilation Type VC   Vent Mode A/C   Set Rate 24 BPM   Vt Set 360 mL   PEEP/CPAP 10 cmH20   Pressure Support 0 cmH20   Waveform RAMP   Peak Flow 66 L/min   Plateau Set/Insp. Hold (sec) 0   Trigger Sensitivity Flow/I-Trigger 3 L/min   Patient Ventilator Parameters   Resp Rate Total 29 br/min   Peak Airway Pressure 37 cmH20   Mean Airway Pressure 18 cmH20   Plateau Pressure 35 cmH20   Exhaled Vt 377 mL   Total Ve 10.9 L/m   I:E Ratio Measured 1:2.30   Conventional Ventilator Alarms   Resp Rate High Alarm 50 br/min   Press High Alarm 60 cmH2O   Apnea Rate 24   Apnea Volume (mL) 0 mL   Apnea Oxygen Concentration  100   Apnea Flow Rate (L/min) 60   T Apnea 25 sec(s)   Ready to Wean/Extubation Screen   FIO2<=50 (chart decimal) (!) 0.55   MV<16L (chart vol.) 10.9   PEEP <=8 (chart #) (!) 10

## 2023-07-28 NOTE — PLAN OF CARE
Patient found in bed, on ventilator. She is awake and alert but otherwise just staring around the room, not moving extremities, making eye contact or otherwise following commands.    Shiely xlt 8.0 trach in place, still positional with small cuff leak depending on her head and neck position.    The abdomen is rounded and a bit tight but with hyperactive bowel sounds. Had x1 loose moderate   brown stool so far this morning.    The carroll is draining clear yellow urine with small amount of sediment.    I am repleting potassium and magnesium now.    Was a bit cool this morning 95.5 and so applied blankets and temperature seems to be improving.    Tube feeding glucerna 1.5 is infusing at 30ml/hr (goal) per kangaroo pump, patient tolerating with residual I could only get about 5-10ml, no emesis. The external bumper was a bit tight given patient's habitus and abdominal distention and so adjusted the bumper (tube at 3cm).    Checked midline to left upper arm, aspirates and draws back, site wnl.    Mepielxes in place to bilat heels and sacrum, on waffle overlay.    All extremities are stiffened with decreased range of motion and contractures.    The bed is low and alarm on. Clinical alarms reviewed and armed. Monitor strip printed and reviewed, sinus rhythm 80s, did not appreciate any SVT yet (consulted dr magallanes per consult order), with rare ventricular ectopy / pvcs. Turning q2h, lines and extremities padded. SCDs in place / on. CHG oral care and CHG pericare done. The skin is dry and flaky.    Towards end of shift, oxygen requirements and peep requirements had started to increase a bit and so notified md and went down for CTA. Peak pressures still in 40s+

## 2023-07-28 NOTE — RESPIRATORY THERAPY
Pt's trach is positional. Air in cuff is green with a leak noted but exhaled VT are good. Pt. Was turned by RN to L side. Will monitor closely.    07/28/23 0800   Patient Assessment/Suction   Level of Consciousness (AVPU) responds to pain   Respiratory Effort Moderate;Labored   Expansion/Accessory Muscles/Retractions no use of accessory muscles   All Lung Fields Breath Sounds coarse;scratchy;crackles;equal bilaterally   Rhythm/Pattern, Respiratory assisted mechanically;labored   Cough Frequency frequent   Cough Type assisted   Suction Method oral;tracheal   Suction Pressure (mmHg) -120 mmHg   $ Suction Charges Inline Suction Procedure Stat Charge;Close Suction System Equipment   Secretions Amount moderate   Secretions Color white   Secretions Characteristics thick;thin   Skin Integrity   $ Wound Care Tech Time 15 min   Area Observed Left;Right;Neck under tracheostomy   Skin Appearance without discoloration   Barrier used? Foam   Barrier Changed? Yes   PRE-TX-O2   Device (Oxygen Therapy) ventilator   $ Is the patient on Low Flow Oxygen? Yes   Oxygen Concentration (%) 55   SpO2 95 %   Pulse Oximetry Type Continuous   $ Pulse Oximetry - Single Charge Pulse Oximetry - Single   $ Pulse Oximetry - Multiple Charge Pulse Oximetry - Multiple   Airway Safety   Is Ambu Bag and Mask with Patient? Yes, Adult Ambu Bag and Mask   Trach Supplies at Bedside Inner Cannula;Suction Catheter;Normal Saline Bullets;Trach Ties;Obturator   Suction set is at the bedside? Yes   Extra trach at bedside? Yes   Extra trach sizes at bedside? 8   Is Obturator Available? Yes   Location of Obturator?  Bedside table   Equipment Change   $ RT Equipment HME   Vent Select   Conventional Vent Y   $ Ventilator Subsequent 1   Charged w/in last 24h YES   Preset Conventional Ventilator Settings   Vent ID 2   Vent Type    Ventilation Type VC   Vent Mode A/C   Humidity HME   Set Rate 24 BPM   Vt Set 360 mL   PEEP/CPAP 10 cmH20   Pressure Support 0 cmH20    Peak Flow 66 L/min   I:E Ratio Set 1:2.4   Trigger Sensitivity Flow/I-Trigger 3 L/min   Patient Ventilator Parameters   Resp Rate Total 28 br/min   Spont Resp Rate 4 br/min   All Fields Breath Sounds crackles, coarse   Peak Airway Pressure 46 cmH20   Mean Airway Pressure 21 cmH20   Plateau Pressure 0 cmH20   Exhaled Vt 357 mL   Total Ve 10.9 L/m   Conventional Ventilator Alarms   Alarms On Y   Ve High Alarm 23 L/min   Ve Low Alarm 3 L/min   Vt High Alarm 1200 mL   Vt Low Alarm 200 mL   Resp Rate High Alarm 50 br/min   Press High Alarm 60 cmH2O   Apnea Rate 24   T Apnea 25 sec(s)   Ready to Wean/Extubation Screen   FIO2<=50 (chart decimal) (!) 0.55   MV<16L (chart vol.) 10.9   PEEP <=8 (chart #) (!) 10   Education   $ Education 30 min;Ventilator Oxygen

## 2023-07-28 NOTE — PROGRESS NOTES
Progress Note  Infectious Disease    Reason for Consult:  Hypothermia, sepsis    HPI: Genna Felix is a 62 y.o. female resident of [facility], known to ID service from prior visits, w PMHx of MAICO 2020, vegetative state, vent dependent, tracheostomy, PEG, PE, HTN, GERD, anemia, osteopenia, acute cholecystitis status post IR drainage in January '22, right arm infection in August '22, MDRs infections like Pseudomonas , Acinetobacter , MRSA, Proteus mirabilis ESBL etc.    She has multiple hospitalizations.   Most recent hospitalization was in the beginning of July.  She had x2 Proteus mirabilis ESBL in urine, Serratia marcescens in sputum, and blood cultures had coag-negative staph on 07/06/2023.  Patient completed treatment with meropenem.    This time, patient is sent to ED because of decreased oxygenation, pulse ox around 87%.  CTA ruled out PE, but found interstitial opacities septal thickening and consolidation of right lower lobe.    She is hypothermic, which is her usual sign of sepsis/infection, needing a Beth Hugger.  She is admitted in ICU, intubated through tracheostomy, FiO2 of 70%, receiving vancomycin and meropenem.  Secretions are pale and yellow.  Sputum cultures are not sent yet, I just did.  Procalcitonin normal at 0.13.  ,  which is slightly higher than usual, blood cultures NGTD    7/24 ([place]): Interim reviewed, discussed with Dr Mccormack, patient seen and examined at bedside, remains trach to vent, FiO2 70%, white thick secretions. Micro reviewed, MRSA nasal negative, blood cultures x 2 no growth to date pending final. Respiratory culture to be collected. UA yesterday with pyuria, pending culture.     7/25: Interim reviewed, patient seen and examined at bedside, remain on FiO2 70%. Hemodynamically stable, afebrile, Labs reviewed,  WBC stable, no left shift, H/H 7.8/26.3, plt: 193. Creatinine 0.4, mild transaminits. Vanco levels supratherapeutic, discontinued. Micro reviewed,  respiratory culture with presumptive Pseudomonas, pending final.     7/26: interim reviewed, patient seen and examined at bedside, remains on FiO2 70%, looks in mild respiratory distress. Micro updated, /MDRO Pseudomonas aeruginosa, resistant to Zosyn as well (SOPHIA >16). Tachycardic, afebrile.     7/27: interim reviewed, patient seen and examined at bedside, remains on fiO2 70%, parameters to be adjusted, she looks comfortable today, hemodynamically stable, afebrile. Labs reviewed, stable.     7/28: interim reviewed, patient had SVTs yesterday afternoon, noted to have track leakage, addressed by Pulmonary. Hypothermic earlier this morning, afebrile, FiO2 down to 45%. Thick green secretions from trach noted.     Antibiotics (From admission, onward)      Start     Stop Route Frequency Ordered    07/26/23 0930  cefTAZidime-avibactam (AVYCAZ) 2.5 g in dextrose 5 % (D5W) 100 mL         08/01 2359 IV Every 8 hours (non-standard times) 07/26/23 0817    07/23/23 0900  mupirocin 2 % ointment         07/28 0859 Nasl 2 times daily 07/23/23 0032          Antifungals (From admission, onward)      Start     Stop Route Frequency Ordered    07/23/23 0900  miconazole 2 % cream         -- Top 2 times daily 07/23/23 0227            Review of patient's allergies indicates:  No Known Allergies  Past Medical History:   Diagnosis Date    Anoxic brain damage 07/2020    Bedbound 07/2020    Cardiac arrest 07/2020    Cirrhosis     GERD (gastroesophageal reflux disease)     Hemangioma of intra-abdominal structure     Hypertension     Nursing home resident     Protein calorie malnutrition     Pulmonary embolus     Respiratory distress     S/P percutaneous endoscopic gastrostomy (PEG) tube placement 07/2020    Total self-care deficit 07/2020    Tracheostomy dependence     7/2020     Past Surgical History:   Procedure Laterality Date    PEG W/TRACHEOSTOMY PLACEMENT  07/27/2020    SKIN GRAFT      buttocks     Social History     Socioeconomic  History    Marital status:    Tobacco Use    Smoking status: Never    Smokeless tobacco: Never   Substance and Sexual Activity    Alcohol use: Not Currently    Drug use: Not Currently    Sexual activity: Not Currently     Family History   Problem Relation Age of Onset    Hypertension Mother     No Known Problems Father          EXAM & DIAGNOSTICS REVIEWED:   Vitals:     Temp:  [95.5 °F (35.3 °C)-97.9 °F (36.6 °C)]   Temp: 96.7 °F (35.9 °C) (07/28/23 0943)  Pulse: 76 (07/28/23 0901)  Resp: (!) 32 (07/28/23 0901)  BP: (!) 97/53 (07/28/23 0901)  SpO2: 98 % (07/28/23 0901)    Intake/Output Summary (Last 24 hours) at 7/28/2023 1005  Last data filed at 7/28/2023 0943  Gross per 24 hour   Intake 1308.34 ml   Output 915 ml   Net 393.34 ml     Vent Mode: A/C  Oxygen Concentration (%):  [55-70] 55  Resp Rate Total:  [26 br/min-36 br/min] 26 br/min  Vt Set:  [360 mL] 360 mL  PEEP/CPAP:  [10 cmH20] 10 cmH20  Pressure Support:  [0 cmH20] 0 cmH20  Mean Airway Pressure:  [9.9 ovA46-20 cmH20] 21 cmH20     General:  Frail elderly lady, laying in bed, back at her baseline, trach to vent FiO2 45%   Eyes:  Anicteric, L eye with hyperemia   ENT:  Dry lips, scattered amount of thick secretions  Neck:  Supple  Lungs: R base rhonchi  Heart:  S1/S2+, regular rhythm   Abd:  Slightly distended, still soft to palpation,+ bowel sounds, PEG in place  :  [place], urine yellow  Musc:  Joints without effusion, swelling, erythema, synovitis; positive for muscle wasting.   Skin:  See pictures.  Positive for dry skin throughout.  No palmar or plantar lesions. No subungual petechiae  Neuro:        Gaze  towards the right upper side  Psych    Lymphatic:       Extrem: No edema, erythema, phlebitis, cellulitis, warm and well perfused  VAD:  Peripheral IV     Midline L 7/7     Isolation:  Contact isolation/[place] resident  Wound:         Lines/Tubes/Drains:    General Labs reviewed:  Recent Labs   Lab 07/26/23  0332 07/26/23  0732 07/27/23  0311  07/27/23  1654 07/27/23 2002 07/28/23 0319 07/28/23  0404   WBC 10.80  --  8.06  --   --  8.67  --    HGB 9.5*  --  8.7*  --   --  8.3*  --    HCT 33.9*   < > 30.5*   < > 36 29.3* 28*     --  200  --   --  220  --     < > = values in this interval not displayed.       Recent Labs   Lab 07/26/23  0332 07/27/23 0311 07/28/23 0319    143 141   K 4.0 3.9 3.7    107 107   CO2 24 27 31*   BUN 14 19 20   CREATININE 0.4* 0.3* 0.4*   CALCIUM 9.1 8.8 8.9   PROT 9.3* 8.3 8.2   BILITOT 1.9* 1.6* 1.4*   ALKPHOS 172* 143* 134   ALT 57* 40 31   AST 48* 27 18     No results for input(s): CRP in the last 168 hours.    Estimated Creatinine Clearance: 152.1 mL/min (A) (based on SCr of 0.4 mg/dL (L)).      Micro:  Microbiology Results (last 7 days)       Procedure Component Value Units Date/Time    Culture, ID (Consult) [[id number]]  (Abnormal) Collected: 07/24/23 1148    Order Status: Completed Specimen: Respiratory from Sputum Updated: 07/28/23 0748     Culture, Identification (consult) PSEUDOMONAS AERUGINOSA   Susceptibility pending      Blood culture x two cultures. Draw prior to antibiotics. [[id number]] Collected: 07/22/23 2044    Order Status: Completed Specimen: Blood Updated: 07/27/23 2232     Blood Culture, Routine No growth after 5 days.    Narrative:      Aerobic and anaerobic  Collection has been rescheduled by 2MB at 07/22/2023 19:43 Reason: Amairani liz said she will call when she wants cultures drawn  Collection has been rescheduled by 2MB at 07/22/2023 19:43 Reason: Amairani liz said she will call when she wants cultures drawn    Blood culture x two cultures. Draw prior to antibiotics. [[id number]] Collected: 07/22/23 2050    Order Status: Completed Specimen: Blood Updated: 07/27/23 2232     Blood Culture, Routine No growth after 5 days.    Narrative:      Aerobic and anaerobic  Collection has been rescheduled by 2MB at 07/22/2023 19:43 Reason: Amairani liz said she will call when she wants  cultures drawn  Collection has been rescheduled by 2MB at 07/22/2023 19:43 Reason: Rn   hamilton shalonda said she will call when she wants cultures drawn    Culture, Respiratory with Gram Stain [[id number]]  (Abnormal)  (Susceptibility) Collected: 07/24/23 1148    Order Status: Completed Specimen: Respiratory from Sputum Updated: 07/26/23 0831     Respiratory Culture PSEUDOMONAS AERUGINOSA   Many  MDRO  Isolate sent out for reference testing       Gram Stain (Respiratory) <10 epithelial cells per low power field.     Gram Stain (Respiratory) Many WBC's     Gram Stain (Respiratory) Moderate Gram negative rods    Urine culture [[id number]] Collected: 07/23/23 1602    Order Status: Completed Specimen: Urine Updated: 07/26/23 0703     Urine Culture, Routine No growth    Narrative:      Specimen Source->Urine    MRSA Screen by PCR [[id number]] Collected: 07/23/23 1610    Order Status: Completed Specimen: Nasopharyngeal Swab from Nasal Updated: 07/23/23 1750     MRSA SCREEN BY PCR Negative             Specimen: Respiratory from Sputum Updated: 07/26/23 0831    Respiratory Culture PSEUDOMONAS AERUGINOSA    Many   MDRO   Isolate sent out for reference testing    Abnormal     Gram Stain (Respiratory) <10 epithelial cells per low power field.    Gram Stain (Respiratory) Many WBC's    Gram Stain (Respiratory) Moderate Gram negative rods   Susceptibility     PSEUDOMONAS AERUGINOSA      CULTURE, RESPIRATORY (Preliminary)     Cefepime 16 mcg/mL Intermediate     Ciprofloxacin >2 mcg/mL Resistant     Gentamicin >8 mcg/mL Resistant     Levofloxacin >4 mcg/mL Resistant     Meropenem >8 mcg/mL Resistant     Piperacillin/Tazo 64 mcg/mL Sensitive     Tobramycin >8 mcg/mL Resistant              Imaging Reviewed:  CXR latest: Improvement of the patchy alveolar opacities in the right lung with stable mild increased interstitial markings suggestive of resolving edema or pneumonia.    Chest x-ray 07/22/2023.      Slight increasing infiltrates  in the lungs.    CT 07/22/2023.     1.  No acute aortic abnormality or pulmonary embolism.  2.  Bilateral interstitial opacities with interlobular septal thickening and consolidation in the right lower lobe.  Imaging features can be seen with COVID pneumonia, though are nonspecific and can occur with a variety of infectious and noninfectious processes. PneInd.  3.  Mediastinal and bihilar lymphadenopathy.  4.  Small bilateral pleural effusions.  5.  No acute abdominal or pelvic abnormality.  6.  Splenomegaly.  7.  Hepatic steatosis.      Cardiology:    IMPRESSION & PLAN     Sepsis + hypothermia resolved, secondary to VAP, trach exchanged 7/27, FiO2 down to 45%  Respiratory culture Pseudomonas,  - Zosyn resistant as well, SOPHIA >16    MRSA nasal swab not detected   Urine culture no growth to date, pending final   Blood cultures x 2 no growth to date pending final     2. Resident of [facility], known to ID service from prior visits, w/ PMHx of MAICO 2020, vegetative state, vent dependent, tracheostomy, PEG, PE, HTN, GERD, anemia, osteopenia, acute cholecystitis status post IR drainage in January '22, right arm infection in August '22, MDRs infections like Pseudomonas , Acinetobacter , MRSA, Proteus mirabilis ESBL etc.Splenomegaly, hepato steatosis      Recommendations:  Avycaz (D3) 2.5g IV q8h will complete 7 days, end date 8/1/23  Monitor O2 sats  Trend LFTs  Aspiration precautions     D/w ICU nursing    Will be available over the weekend    Medical Decision Making during this encounter was  [_] Low Complexity  [_] Moderate Complexity  [ xxx ] High Complexity

## 2023-07-28 NOTE — CONSULTS
[facility]  Department of Cardiology  Consult Note      PATIENT NAME: Genna Felix  MRN: [id number]  TODAY'S DATE: 07/28/2023  ADMIT DATE: 7/22/2023                          CONSULT REQUESTED BY: Kirill Garcia MD    SUBJECTIVE     PRINCIPAL PROBLEM: Acute on chronic respiratory failure      REASON FOR CONSULT:    SVT started on metoprolol    HPI obtained from records- patient unresponsive.    HPI:      Patient is a 62 y.o. female with PMH of anoxic brain damage, vegetative state, cardiac arrest, cirrhosis, anemia, GERD, HTN, PE, PEG tube, tracheostomy with vent dependence, malnutrition, who presented to the ED from nursing home with hypoxia and hypothermia. Patient was admitted with severe sepsis and acute respiratory failure.  Infectious disease following. Sputum culture + for pseudomonas aeruginosa . BC negative.  Cardiology consulted for SVT overnight for which patient was started on metoprolol tartrate 50 mg q 6 hrs.      ECHO 3/21/22  The left ventricle is normal in size with  The estimated ejection fraction is 65%.  Normal left ventricular diastolic function.  Normal right ventricular size with normal right ventricular systolic function.  There is mild pulmonary hypertension.  Normal central venous pressure (3 mmHg).  The estimated PA systolic pressure is 48 mmHg.  On long-axis view there appears to be 1 cm echo artifact on the anterior leaf of the mitral valve left atrial side that is not seen on any other view-this probably represents mirror artifact  Mild tricuspid regurgitation.       FROM H&P     HPI:  Patient presents to ER due to respiratory distress.      Spoke with sister, Amelia. States that she was told her oxygen was dropping low at Ms. Felix's nursing home, [place]. Was transferred to the ER. Called [facility] for more info and spoke with Ms. Yousif (a nurse who got sign out from the day team about patient). Was told that Respiratory was trying to improve her sats  throughout the day however it never improved above 87%. Patient ws then transferred to ER. Staff noticed urine output was dirty with sediment so u/a and urine culture ordered.        Overview/Hospital Course:  Genna Felix is a 62 year old female with a past medical history of anoxic brain injury s/p G tube and tracheostomy, PE, GERD, HTN, hepatic steatosis, obesity, and anemia who presented with severe sepsis with acute respiratory failure requiring mechanical ventilation. Respiratory cultures show  Pseudomonas. ID has been consulted and started Avycaz. Pulmonary has also been consulted for ventilator management.       Review of patient's allergies indicates:  No Known Allergies    Past Medical History:   Diagnosis Date    Anoxic brain damage 07/2020    Bedbound 07/2020    Cardiac arrest 07/2020    Cirrhosis     GERD (gastroesophageal reflux disease)     Hemangioma of intra-abdominal structure     Hypertension     Nursing home resident     Protein calorie malnutrition     Pulmonary embolus     Respiratory distress     S/P percutaneous endoscopic gastrostomy (PEG) tube placement 07/2020    Total self-care deficit 07/2020    Tracheostomy dependence     7/2020     Past Surgical History:   Procedure Laterality Date    PEG W/TRACHEOSTOMY PLACEMENT  07/27/2020    SKIN GRAFT      buttocks     Social History     Tobacco Use    Smoking status: Never    Smokeless tobacco: Never   Substance Use Topics    Alcohol use: Not Currently    Drug use: Not Currently        REVIEW OF SYSTEMS  Unable to obtain    OBJECTIVE     VITAL SIGNS (Most Recent)  Temp: (!) 95.8 °F (35.4 °C) (07/28/23 0718)  Pulse: 76 (07/28/23 0901)  Resp: (!) 32 (07/28/23 0901)  BP: (!) 97/53 (07/28/23 0901)  SpO2: 98 % (07/28/23 0901)    VENTILATION STATUS  Resp: (!) 32 (07/28/23 0901)  SpO2: 98 % (07/28/23 0901)  Vent Mode: A/C  Oxygen Concentration (%):  [55-70] 55  Resp Rate Total:  [26 br/min-38 br/min] 26 br/min  Vt Set:  [360 mL] 360 mL  PEEP/CPAP:   [10 cmH20] 10 cmH20  Pressure Support:  [0 cmH20] 0 cmH20  Mean Airway Pressure:  [9.9 fjE20-68 cmH20] 21 cmH20        I & O (Last 24H):  Intake/Output Summary (Last 24 hours) at 7/28/2023 0921  Last data filed at 7/28/2023 0912  Gross per 24 hour   Intake 1308.34 ml   Output 850 ml   Net 458.34 ml       WEIGHTS  Wt Readings from Last 3 Encounters:   07/27/23 0630 92.7 kg (204 lb 5.9 oz)   07/24/23 0530 89 kg (196 lb 3.4 oz)   07/22/23 1931 88.7 kg (195 lb 8 oz)   07/06/23 2026 88.6 kg (195 lb 5.2 oz)   07/06/23 0908 82.6 kg (182 lb)   06/12/23 0829 77.1 kg (170 lb)       PHYSICAL EXAM    Limited examination as patient was being cleaned by nursing staff    Physical examination:      RRR on tele. No further SVT today.  Patient on mechanical ventilation, unresponsive.    HOME MEDICATIONS:  No current facility-administered medications on file prior to encounter.     Current Outpatient Medications on File Prior to Encounter   Medication Sig Dispense Refill    acetaminophen (TYLENOL) 325 MG tablet 2 tablets (650 mg total) by Per G Tube route every 4 (four) hours as needed for Pain or Temperature greater than (100.4F).  0    acetylcysteine 100 mg/ml, 10%, (MUCOMYST) 100 mg/mL (10 %) nebulizer solution Take 4 mLs by nebulization every 8 (eight) hours.  12    albuterol-ipratropium (DUO-NEB) 2.5 mg-0.5 mg/3 mL nebulizer solution Take 3 mLs by nebulization every 6 (six) hours. Rescue 1 Box 0    arginine/glutamine/calcium bmb (XIOMARA ORAL) 1 Package by PEG Tube route 2 (two) times a day. In 8oz of water      cefTRIAXone (ROCEPHIN) 1 gram injection Inject 1 g into the muscle daily as needed.      cycloSPORINE (RESTASIS) 0.05 % ophthalmic emulsion 1 drop 2 (two) times daily. 0900  2100      dextran 70-hypromellose (ARTIFICIAL TEARS,[id number],) ophthalmic solution Place 1 drop into both eyes 4 (four) times daily as needed.      ergocalciferol (ERGOCALCIFEROL) 50,000 unit Cap Take 50,000 Units by mouth every Saturday.       ferrous sulfate 300 mg (60 mg iron)/5 mL syrup Take 300 mg by mouth As instructed. Every other day      lactose-reduced food (ISOSOURCE ORAL) 1.5 mg by Per G Tube route continuous. 1.5 per peg tube at 50ml/hr continuously      linaCLOtide (LINZESS) 145 mcg Cap capsule Take 145 mcg by mouth before breakfast.      miconazole (MICOTIN) 2 % cream Apply topically 2 (two) times daily. Reason:  Tinea pedis for 14 days  0    midodrine (PROAMATINE) 10 MG tablet 1 tablet (10 mg total) by Per G Tube route 3 (three) times daily. (Patient taking differently: 10 mg by Per G Tube route 3 (three) times daily. 0600  1400  2200) 90 tablet 11    ondansetron (ZOFRAN) 4 MG tablet 4 mg by Per G Tube route every 8 (eight) hours as needed for Nausea.      polyethylene glycol (GLYCOLAX) 17 gram PwPk Take 17 g by mouth daily as needed.      polyvinyl alcohol-povidone (CLEAR EYES NATURAL TEARS) 0.5-0.6 % Drop Apply 1 drop to eye daily as needed.      [DISCONTINUED] clonazePAM (KLONOPIN) 0.5 MG tablet Take 0.5 mg by mouth 2 (two) times daily.      [DISCONTINUED] oxybutynin (DITROPAN) 5 MG Tab Take 5 mg by mouth 2 (two) times daily.         SCHEDULED MEDS:   acetylcysteine 200 mg/ml (20%)  2 mL Nebulization TID WAKE    carboxymethylcellulose  1 drop Both Eyes QID    ceftazidime-avibactam (AVYCAZ) IVPB  2.5 g Intravenous Q8H    enoxparin  40 mg Subcutaneous Daily    ergocalciferol  4,000 Units Per G Tube Daily    levalbuterol  1.25 mg Nebulization Q8H    metoprolol tartrate  50 mg Per G Tube Q6H    miconazole   Topical (Top) BID    midodrine  10 mg Per G Tube TID AC       CONTINUOUS INFUSIONS:    PRN MEDS:calcium gluconate IVPB, calcium gluconate IVPB, calcium gluconate IVPB, magnesium sulfate IVPB, magnesium sulfate IVPB, ondansetron, polyethylene glycol, potassium chloride **AND** potassium chloride **AND** potassium chloride, sodium chloride 0.9%, sodium phosphate IVPB, sodium phosphate IVPB, sodium phosphate IVPB    LABS AND DIAGNOSTICS      CBC LAST 3 DAYS  Recent Labs   Lab 07/26/23  0332 07/26/23  0732 07/27/23 0311 07/27/23 1654 07/27/23 2002 07/28/23 0319 07/28/23  0404   WBC 10.80  --  8.06  --   --  8.67  --    RBC 3.61*  --  3.33*  --   --  3.19*  --    HGB 9.5*  --  8.7*  --   --  8.3*  --    HCT 33.9*   < > 30.5*   < > 36 29.3* 28*   MCV 94  --  92  --   --  92  --    MCH 26.3*  --  26.1*  --   --  26.0*  --    MCHC 28.0*  --  28.5*  --   --  28.3*  --    RDW 22.0*  --  21.7*  --   --  22.2*  --      --  200  --   --  220  --    MPV 11.2  --  11.4  --   --  12.4  --    GRAN 78.1*  8.4*  --  68.3  5.5  --   --  69.1  6.0  --    LYMPH 8.9*  1.0  --  20.1  1.6  --   --  17.3*  1.5  --    MONO 8.4  0.9  --  6.8  0.6  --   --  6.8  0.6  --    BASO 0.02  --  0.01  --   --  0.01  --    NRBC 0  --  0  --   --  0  --     < > = values in this interval not displayed.       COAGULATION LAST 3 DAYS  No results for input(s): LABPT, INR, APTT in the last 168 hours.    CHEMISTRY LAST 3 DAYS  Recent Labs   Lab 07/26/23  0332 07/26/23  0442 07/27/23 0311 07/27/23  0943 07/27/23 1654 07/27/23 2002 07/28/23 0319 07/28/23 0404     --  143  --   --   --  141  --    K 4.0  --  3.9  --   --   --  3.7  --      --  107  --   --   --  107  --    CO2 24  --  27  --   --   --  31*  --    ANIONGAP 7*  --  9  --   --   --  3*  --    BUN 14  --  19  --   --   --  20  --    CREATININE 0.4*  --  0.3*  --   --   --  0.4*  --      --  108  --   --   --  95  --    CALCIUM 9.1  --  8.8  --   --   --  8.9  --    PH  --    < >  --    < > 7.416 7.446  --  7.424   MG 1.6  --  1.9  --   --   --  1.8  --    ALBUMIN 3.4*  --  2.9*  --   --   --  2.9*  --    PROT 9.3*  --  8.3  --   --   --  8.2  --    ALKPHOS 172*  --  143*  --   --   --  134  --    ALT 57*  --  40  --   --   --  31  --    AST 48*  --  27  --   --   --  18  --    BILITOT 1.9*  --  1.6*  --   --   --  1.4*  --     < > = values in this interval not displayed.       CARDIAC  PROFILE LAST 3 DAYS  Recent Labs   Lab 07/22/23 1934   *   TROPONINIHS 40.0*       ENDOCRINE LAST 3 DAYS  Recent Labs   Lab 07/22/23 1934 07/23/23  0315   TSH  --  1.880   PROCAL 0.13  --        LAST ARTERIAL BLOOD GAS  ABG  Recent Labs   Lab 07/28/23  0404   PH 7.424   PO2 145*   PCO2 49.3*   HCO3 32.3*   BE 8       LAST 7 DAYS MICROBIOLOGY   Microbiology Results (last 7 days)       Procedure Component Value Units Date/Time    Culture, ID (Consult) [[id number]]  (Abnormal) Collected: 07/24/23 1148    Order Status: Completed Specimen: Respiratory from Sputum Updated: 07/28/23 0748     Culture, Identification (consult) PSEUDOMONAS AERUGINOSA   Susceptibility pending      Blood culture x two cultures. Draw prior to antibiotics. [[id number]] Collected: 07/22/23 2044    Order Status: Completed Specimen: Blood Updated: 07/27/23 2232     Blood Culture, Routine No growth after 5 days.    Narrative:      Aerobic and anaerobic  Collection has been rescheduled by 2MB at 07/22/2023 19:43 Reason: Amairani liz said she will call when she wants cultures drawn  Collection has been rescheduled by 2MB at 07/22/2023 19:43 Reason: Amairani liz said she will call when she wants cultures drawn    Blood culture x two cultures. Draw prior to antibiotics. [[id number]] Collected: 07/22/23 2050    Order Status: Completed Specimen: Blood Updated: 07/27/23 2232     Blood Culture, Routine No growth after 5 days.    Narrative:      Aerobic and anaerobic  Collection has been rescheduled by 2MB at 07/22/2023 19:43 Reason: Amairani liz said she will call when she wants cultures drawn  Collection has been rescheduled by 2MB at 07/22/2023 19:43 Reason: Amairani liz said she will call when she wants cultures drawn    Culture, Respiratory with Gram Stain [[id number]]  (Abnormal)  (Susceptibility) Collected: 07/24/23 1148    Order Status: Completed Specimen: Respiratory from Sputum Updated: 07/26/23 0831     Respiratory Culture  PSEUDOMONAS AERUGINOSA   Many  MDRO  Isolate sent out for reference testing       Gram Stain (Respiratory) <10 epithelial cells per low power field.     Gram Stain (Respiratory) Many WBC's     Gram Stain (Respiratory) Moderate Gram negative rods    Urine culture [[id number]] Collected: 07/23/23 1602    Order Status: Completed Specimen: Urine Updated: 07/26/23 0703     Urine Culture, Routine No growth    Narrative:      Specimen Source->Urine    MRSA Screen by PCR [[id number]] Collected: 07/23/23 1610    Order Status: Completed Specimen: Nasopharyngeal Swab from Nasal Updated: 07/23/23 1750     MRSA SCREEN BY PCR Negative            MOST RECENT IMAGING  X-Ray Chest 1 View  CLINICAL HISTORY:  62 years (1960) Female pneumonia Pneumonia    TECHNIQUE:  Portable AP radiograph the chest. One view.    COMPARISON:  Radiograph from July 27, 2023    FINDINGS:  Mildly increased central hilar interstitial opacities are seen bilaterally suggestive of either mild edema, atypical infection/pneumonia or bronchitis in the appropriate clinical setting. No pneumothorax is identified. The heart is top normal in size. Atheromatous calcifications are seen at the aortic arch. Osseous structures appear unchanged. The visualized upper abdomen is unremarkable.    Lines and tubes: Tracheostomy tube with tip at the level of the clavicular heads.    IMPRESSION:  Slightly improved lung volumes otherwise unchanged radiograph the chest.    .    Electronically signed by:  Robert Farrell MD  7/28/2023 8:08 AM CDT Workstation: [id number]      ECHOCARDIOGRAM RESULTS (last 5)  Results for orders placed during the hospital encounter of 03/17/22    Transesophageal echo (BRENDAN)    Interpretation Summary  · The left ventricle is normal in size with normal systolic function.  · The estimated ejection fraction is 60%.  · Normal left ventricular diastolic function.  · Normal right ventricular size with normal right ventricular systolic function.  · No  interatrial septal defect present.  · Normal appearing left atrial appendage. No thrombus is present in the appendage. Normal appendage velocities.  · There is no pulmonary hypertension.  · Grade 2 plaque present in the transverse aorta and descending aorta.    No valvular vegetations seen no abnormal intracavitary echoes  Descending aorta and transverse aorta have moderate amount of atheroma seen      Echo    Interpretation Summary  · The left ventricle is normal in size with  · The estimated ejection fraction is 65%.  · Normal left ventricular diastolic function.  · Normal right ventricular size with normal right ventricular systolic function.  · There is mild pulmonary hypertension.  · Normal central venous pressure (3 mmHg).  · The estimated PA systolic pressure is 48 mmHg.  · On long-axis view there appears to be 1 cm echo artifact on the anterior leaf of the mitral valve left atrial side that is not seen on any other view-this probably represents mirror artifact  · Mild tricuspid regurgitation.      CURRENT/PREVIOUS VISIT EKG  Results for orders placed or performed during the hospital encounter of 07/22/23   EKG 12-lead    Collection Time: 07/27/23  3:14 PM    Narrative    Test Reason : I47.1,    Vent. Rate : 117 BPM     Atrial Rate : 117 BPM     P-R Int : 154 ms          QRS Dur : 088 ms      QT Int : 332 ms       P-R-T Axes : 073 076 066 degrees     QTc Int : 463 ms    Sinus tachycardia  Otherwise normal ECG  When compared with ECG of 26-JUL-2023 07:06,  Aberrant conduction is no longer Present    Referred By: [email or web address]   SELF           Confirmed By:            ASSESSMENT/PLAN:     Active Hospital Problems    Diagnosis    *Acute on chronic respiratory failure    Obesity (BMI 30-39.9)    Hepatic steatosis    Pressure injury of buttock, stage 2    Pneumonia of both lungs due to infectious organism    Essential hypertension    Tracheostomy dependence    PEG (percutaneous endoscopic gastrostomy) status    Anemia  of chronic disease    Persistent vegetative state       ASSESSMENT & PLAN:     SVT  Acute on chronic respiratory failure  Obesity  Pneumonia  Chronic Anemia  Sepsis  Persistent vegetative state  Tracheostomy dependency          RECOMMENDATIONS:    Patient admitted for acute on chronic respiratory failure and severe sepsis.    Cardiology consulted for SVT.  Multiple exacerbating factors including sepsis, anemia, respiratory failure. TSH normal range.  SVT improved. No further events today. Continue metoprolol tartrate 50 mg TID. Hold for SBP <100.  Limited ECHO to assess LV function.  Thank you for the consultation. We will sign off.       uLpe Felix NP  Department of Cardiology  Date of Service: 07/28/2023

## 2023-07-29 ENCOUNTER — CLINICAL SUPPORT (OUTPATIENT)
Dept: CARDIOLOGY | Facility: HOSPITAL | Age: 63
DRG: 870 | End: 2023-07-29
Attending: EMERGENCY MEDICINE
Payer: MEDICARE

## 2023-07-29 VITALS — HEIGHT: 60 IN | BODY MASS INDEX: 42.07 KG/M2 | WEIGHT: 214.31 LBS

## 2023-07-29 PROBLEM — J69.0 ASPIRATION PNEUMONIA: Status: RESOLVED | Noted: 2022-07-29 | Resolved: 2023-07-29

## 2023-07-29 LAB
ALBUMIN SERPL BCP-MCNC: 2.9 G/DL (ref 3.5–5.2)
ALLENS TEST: ABNORMAL
ALP SERPL-CCNC: 134 U/L (ref 55–135)
ALT SERPL W/O P-5'-P-CCNC: 24 U/L (ref 10–44)
ANION GAP SERPL CALC-SCNC: 7 MMOL/L (ref 8–16)
AST SERPL-CCNC: 16 U/L (ref 10–40)
BACTERIA ISLT: ABNORMAL
BACTERIA SPEC AEROBE CULT: ABNORMAL
BASOPHILS # BLD AUTO: 0.01 K/UL (ref 0–0.2)
BASOPHILS NFR BLD: 0.1 % (ref 0–1.9)
BILIRUB SERPL-MCNC: 1.3 MG/DL (ref 0.1–1)
BNP SERPL-MCNC: 129 PG/ML (ref 0–99)
BSA FOR ECHO PROCEDURE: 2.03 M2
BUN SERPL-MCNC: 17 MG/DL (ref 6–20)
CALCIUM SERPL-MCNC: 8.8 MG/DL (ref 8.7–10.5)
CHLORIDE SERPL-SCNC: 100 MMOL/L (ref 95–110)
CO2 SERPL-SCNC: 31 MMOL/L (ref 23–29)
CREAT SERPL-MCNC: <0.3 MG/DL (ref 0.5–1.4)
DELSYS: ABNORMAL
DIFFERENTIAL METHOD: ABNORMAL
EOSINOPHIL # BLD AUTO: 0.5 K/UL (ref 0–0.5)
EOSINOPHIL NFR BLD: 6.8 % (ref 0–8)
ERYTHROCYTE [DISTWIDTH] IN BLOOD BY AUTOMATED COUNT: 21.8 % (ref 11.5–14.5)
ERYTHROCYTE [SEDIMENTATION RATE] IN BLOOD BY WESTERGREN METHOD: 24 MM/H
EST. GFR  (NO RACE VARIABLE): >60 ML/MIN/1.73 M^2
FIO2: 97
GLUCOSE SERPL-MCNC: 103 MG/DL (ref 70–110)
GLUCOSE SERPL-MCNC: 104 MG/DL (ref 70–110)
GRAM STN SPEC: ABNORMAL
HCO3 UR-SCNC: 31.9 MMOL/L (ref 24–28)
HCT VFR BLD AUTO: 27.1 % (ref 37–48.5)
HCT VFR BLD CALC: 27 %PCV (ref 36–54)
HGB BLD-MCNC: 7.9 G/DL (ref 12–16)
IMM GRANULOCYTES # BLD AUTO: 0.03 K/UL (ref 0–0.04)
IMM GRANULOCYTES NFR BLD AUTO: 0.4 % (ref 0–0.5)
LYMPHOCYTES # BLD AUTO: 1.9 K/UL (ref 1–4.8)
LYMPHOCYTES NFR BLD: 23.9 % (ref 18–48)
MAGNESIUM SERPL-MCNC: 1.9 MG/DL (ref 1.6–2.6)
MCH RBC QN AUTO: 26.3 PG (ref 27–31)
MCHC RBC AUTO-ENTMCNC: 29.2 G/DL (ref 32–36)
MCV RBC AUTO: 90 FL (ref 82–98)
MODE: ABNORMAL
MONOCYTES # BLD AUTO: 0.6 K/UL (ref 0.3–1)
MONOCYTES NFR BLD: 7.6 % (ref 4–15)
NEUTROPHILS # BLD AUTO: 4.8 K/UL (ref 1.8–7.7)
NEUTROPHILS NFR BLD: 61.2 % (ref 38–73)
NRBC BLD-RTO: 0 /100 WBC
PCO2 BLDA: 46 MMHG (ref 35–45)
PEEP: 10
PH SMN: 7.45 [PH] (ref 7.35–7.45)
PHOSPHATE SERPL-MCNC: 3 MG/DL (ref 2.7–4.5)
PIP: 40
PLATELET # BLD AUTO: 211 K/UL (ref 150–450)
PMV BLD AUTO: 11.7 FL (ref 9.2–12.9)
PO2 BLDA: 72 MMHG (ref 80–100)
POC BE: 8 MMOL/L
POC IONIZED CALCIUM: 1.23 MMOL/L (ref 1.06–1.42)
POC SATURATED O2: 95 % (ref 95–100)
POC TCO2: 33 MMOL/L (ref 23–27)
POTASSIUM BLD-SCNC: 4.1 MMOL/L (ref 3.5–5.1)
POTASSIUM SERPL-SCNC: 3.9 MMOL/L (ref 3.5–5.1)
PROT SERPL-MCNC: 7.8 G/DL (ref 6–8.4)
RBC # BLD AUTO: 3 M/UL (ref 4–5.4)
SAMPLE: ABNORMAL
SITE: ABNORMAL
SODIUM BLD-SCNC: 138 MMOL/L (ref 136–145)
SODIUM SERPL-SCNC: 138 MMOL/L (ref 136–145)
SP02: 55
VT: 360
WBC # BLD AUTO: 7.78 K/UL (ref 3.9–12.7)

## 2023-07-29 PROCEDURE — 83880 ASSAY OF NATRIURETIC PEPTIDE: CPT | Performed by: STUDENT IN AN ORGANIZED HEALTH CARE EDUCATION/TRAINING PROGRAM

## 2023-07-29 PROCEDURE — 93306 TTE W/DOPPLER COMPLETE: CPT | Mod: 26,,, | Performed by: INTERNAL MEDICINE

## 2023-07-29 PROCEDURE — 93306 ECHO (CUPID ONLY): ICD-10-PCS | Mod: 26,,, | Performed by: INTERNAL MEDICINE

## 2023-07-29 PROCEDURE — 63600175 PHARM REV CODE 636 W HCPCS: Performed by: FAMILY MEDICINE

## 2023-07-29 PROCEDURE — 94761 N-INVAS EAR/PLS OXIMETRY MLT: CPT

## 2023-07-29 PROCEDURE — 25000003 PHARM REV CODE 250: Performed by: FAMILY MEDICINE

## 2023-07-29 PROCEDURE — 93306 TTE W/DOPPLER COMPLETE: CPT

## 2023-07-29 PROCEDURE — 36415 COLL VENOUS BLD VENIPUNCTURE: CPT | Performed by: STUDENT IN AN ORGANIZED HEALTH CARE EDUCATION/TRAINING PROGRAM

## 2023-07-29 PROCEDURE — 25000242 PHARM REV CODE 250 ALT 637 W/ HCPCS: Performed by: INTERNAL MEDICINE

## 2023-07-29 PROCEDURE — 85025 COMPLETE CBC W/AUTO DIFF WBC: CPT | Performed by: FAMILY MEDICINE

## 2023-07-29 PROCEDURE — 94640 AIRWAY INHALATION TREATMENT: CPT

## 2023-07-29 PROCEDURE — 99900031 HC PATIENT EDUCATION (STAT)

## 2023-07-29 PROCEDURE — 99900026 HC AIRWAY MAINTENANCE (STAT)

## 2023-07-29 PROCEDURE — 84100 ASSAY OF PHOSPHORUS: CPT | Performed by: FAMILY MEDICINE

## 2023-07-29 PROCEDURE — 63600175 PHARM REV CODE 636 W HCPCS: Mod: JZ,JG | Performed by: STUDENT IN AN ORGANIZED HEALTH CARE EDUCATION/TRAINING PROGRAM

## 2023-07-29 PROCEDURE — 20000000 HC ICU ROOM

## 2023-07-29 PROCEDURE — 80053 COMPREHEN METABOLIC PANEL: CPT | Performed by: FAMILY MEDICINE

## 2023-07-29 PROCEDURE — 83735 ASSAY OF MAGNESIUM: CPT | Performed by: FAMILY MEDICINE

## 2023-07-29 PROCEDURE — 99291 PR CRITICAL CARE, E/M 30-74 MINUTES: ICD-10-PCS | Mod: ,,, | Performed by: INTERNAL MEDICINE

## 2023-07-29 PROCEDURE — 36415 COLL VENOUS BLD VENIPUNCTURE: CPT | Performed by: FAMILY MEDICINE

## 2023-07-29 PROCEDURE — 25000003 PHARM REV CODE 250: Performed by: STUDENT IN AN ORGANIZED HEALTH CARE EDUCATION/TRAINING PROGRAM

## 2023-07-29 PROCEDURE — 94003 VENT MGMT INPAT SUBQ DAY: CPT

## 2023-07-29 PROCEDURE — 99900035 HC TECH TIME PER 15 MIN (STAT)

## 2023-07-29 PROCEDURE — 99291 CRITICAL CARE FIRST HOUR: CPT | Mod: ,,, | Performed by: INTERNAL MEDICINE

## 2023-07-29 PROCEDURE — 27000221 HC OXYGEN, UP TO 24 HOURS

## 2023-07-29 PROCEDURE — 76937 US GUIDE VASCULAR ACCESS: CPT

## 2023-07-29 PROCEDURE — 25000003 PHARM REV CODE 250: Performed by: INTERNAL MEDICINE

## 2023-07-29 RX ORDER — LANOLIN ALCOHOL/MO/W.PET/CERES
800 CREAM (GRAM) TOPICAL
Status: DISCONTINUED | OUTPATIENT
Start: 2023-07-29 | End: 2023-08-01 | Stop reason: HOSPADM

## 2023-07-29 RX ORDER — SODIUM,POTASSIUM PHOSPHATES 280-250MG
2 POWDER IN PACKET (EA) ORAL
Status: DISCONTINUED | OUTPATIENT
Start: 2023-07-29 | End: 2023-08-01 | Stop reason: HOSPADM

## 2023-07-29 RX ADMIN — Medication 4000 UNITS: at 11:07

## 2023-07-29 RX ADMIN — LEVALBUTEROL HYDROCHLORIDE 1.25 MG: 1.25 SOLUTION RESPIRATORY (INHALATION) at 04:07

## 2023-07-29 RX ADMIN — CARBOXYMETHYLCELLULOSE SODIUM 1 DROP: 5 SOLUTION/ DROPS OPHTHALMIC at 09:07

## 2023-07-29 RX ADMIN — CARBOXYMETHYLCELLULOSE SODIUM 1 DROP: 5 SOLUTION/ DROPS OPHTHALMIC at 01:07

## 2023-07-29 RX ADMIN — MIDODRINE HYDROCHLORIDE 10 MG: 2.5 TABLET ORAL at 03:07

## 2023-07-29 RX ADMIN — CARBOXYMETHYLCELLULOSE SODIUM 1 DROP: 5 SOLUTION/ DROPS OPHTHALMIC at 04:07

## 2023-07-29 RX ADMIN — DEXTROSE MONOHYDRATE 2.5 G: 50 INJECTION, SOLUTION INTRAVENOUS at 01:07

## 2023-07-29 RX ADMIN — ENOXAPARIN SODIUM 40 MG: 100 INJECTION SUBCUTANEOUS at 04:07

## 2023-07-29 RX ADMIN — DEXTROSE MONOHYDRATE 2.5 G: 50 INJECTION, SOLUTION INTRAVENOUS at 09:07

## 2023-07-29 RX ADMIN — POTASSIUM CHLORIDE 40 MEQ: 7.46 INJECTION, SOLUTION INTRAVENOUS at 05:07

## 2023-07-29 RX ADMIN — MIDODRINE HYDROCHLORIDE 10 MG: 2.5 TABLET ORAL at 11:07

## 2023-07-29 RX ADMIN — MICONAZOLE NITRATE: 20 CREAM TOPICAL at 09:07

## 2023-07-29 RX ADMIN — LEVALBUTEROL HYDROCHLORIDE 1.25 MG: 1.25 SOLUTION RESPIRATORY (INHALATION) at 11:07

## 2023-07-29 RX ADMIN — MIDODRINE HYDROCHLORIDE 10 MG: 2.5 TABLET ORAL at 05:07

## 2023-07-29 RX ADMIN — DEXTROSE MONOHYDRATE 2.5 G: 50 INJECTION, SOLUTION INTRAVENOUS at 04:07

## 2023-07-29 RX ADMIN — LEVALBUTEROL HYDROCHLORIDE 1.25 MG: 1.25 SOLUTION RESPIRATORY (INHALATION) at 07:07

## 2023-07-29 RX ADMIN — METOPROLOL TARTRATE 50 MG: 50 TABLET, FILM COATED ORAL at 11:07

## 2023-07-29 NOTE — PLAN OF CARE
Plan of care reviewed with Partha . Safety precautions maintained. Pt remains free from injury.  Patient remains intubated. Urinary catheter in place and draining. SCDs on. Turned/repositioned as tolerated. Vital signs stable. Afebrile.  Call light within reach. Bed in lowest position and locked.    Patient was able to complete CT of chest at beginning of this shift. Patient made it down and back on portable ventilator. Patient handled transport well. During the night patient had some soft blood pressures, which were resolved by self without the need of medication. Bath completed this shift. At times a leak could be heard coming out of patients mouth around the trach, this was resolved with some repositioning. Tube feeding continued at 30ml/hr with 30ml of flush Q4. Patient opens eyes spontaneously, but will not track or follow directions.     Problem: Infection  Goal: Absence of Infection Signs and Symptoms  Outcome: Ongoing, Progressing     Problem: Adult Inpatient Plan of Care  Goal: Plan of Care Review  Outcome: Ongoing, Progressing  Goal: Patient-Specific Goal (Individualized)  Outcome: Ongoing, Progressing  Goal: Absence of Hospital-Acquired Illness or Injury  Outcome: Ongoing, Progressing  Goal: Optimal Comfort and Wellbeing  Outcome: Ongoing, Progressing  Goal: Readiness for Transition of Care  Outcome: Ongoing, Progressing     Problem: Fluid Imbalance (Pneumonia)  Goal: Fluid Balance  Outcome: Ongoing, Progressing     Problem: Infection (Pneumonia)  Goal: Resolution of Infection Signs and Symptoms  Outcome: Ongoing, Progressing     Problem: Respiratory Compromise (Pneumonia)  Goal: Effective Oxygenation and Ventilation  Outcome: Ongoing, Progressing     Problem: Fall Injury Risk  Goal: Absence of Fall and Fall-Related Injury  Outcome: Ongoing, Progressing     Problem: Impaired Wound Healing  Goal: Optimal Wound Healing  Outcome: Ongoing, Progressing     Problem: Skin Injury Risk Increased  Goal: Skin  Health and Integrity  Outcome: Ongoing, Progressing     Problem: Communication Impairment (Mechanical Ventilation, Invasive)  Goal: Effective Communication  Outcome: Ongoing, Progressing     Problem: Device-Related Complication Risk (Mechanical Ventilation, Invasive)  Goal: Optimal Device Function  Outcome: Ongoing, Progressing     Problem: Inability to Wean (Mechanical Ventilation, Invasive)  Goal: Mechanical Ventilation Liberation  Outcome: Ongoing, Progressing     Problem: Nutrition Impairment (Mechanical Ventilation, Invasive)  Goal: Optimal Nutrition Delivery  Outcome: Ongoing, Progressing     Problem: Skin and Tissue Injury (Mechanical Ventilation, Invasive)  Goal: Absence of Device-Related Skin and Tissue Injury  Outcome: Ongoing, Progressing     Problem: Ventilator-Induced Lung Injury (Mechanical Ventilation, Invasive)  Goal: Absence of Ventilator-Induced Lung Injury  Outcome: Ongoing, Progressing     Problem: Communication Impairment (Artificial Airway)  Goal: Effective Communication  Outcome: Ongoing, Progressing     Problem: Device-Related Complication Risk (Artificial Airway)  Goal: Optimal Device Function  Outcome: Ongoing, Progressing     Problem: Skin and Tissue Injury (Artificial Airway)  Goal: Absence of Device-Related Skin or Tissue Injury  Outcome: Ongoing, Progressing     Problem: Noninvasive Ventilation Acute  Goal: Effective Unassisted Ventilation and Oxygenation  Outcome: Ongoing, Progressing     Problem: Aspiration (Enteral Nutrition)  Goal: Absence of Aspiration Signs and Symptoms  Outcome: Ongoing, Progressing     Problem: Device-Related Complication Risk (Enteral Nutrition)  Goal: Safe, Effective Therapy Delivery  Outcome: Ongoing, Progressing     Problem: Feeding Intolerance (Enteral Nutrition)  Goal: Feeding Tolerance  Outcome: Ongoing, Progressing

## 2023-07-29 NOTE — PROGRESS NOTES
"[facility] Medicine  Progress Note    Patient Name: Genna Felix  MRN: [id number]  Patient Class: IP- Inpatient   Admission Date: 7/22/2023  Length of Stay: 7 days  Attending Physician: Kirill Garcia MD  Primary Care Provider: Primary Doctor No        Subjective:     Principal Problem:Acute on chronic respiratory failure        HPI:  Patient presents to ER due to respiratory distress.     Spoke with sister, Amelia. States that she was told her oxygen was dropping low at Ms. Felix's nursing home, [facility]. Was transferred to the ER. Called [facility] for more info and spoke with Ms. Yousif (a nurse who got sign out from the day team about patient). Was told that Respiratory was trying to improve her sats throughout the day however it never improved above 87%. Patient ws then transferred to ER. Staff noticed urine output was dirty with sediment so u/a and urine culture ordered.      Overview/Hospital Course:  Genna Felix is a 62 year old female with a past medical history of anoxic brain injury s/p G tube and tracheostomy, PE, GERD, HTN, hepatic steatosis, obesity, and anemia who presented with severe sepsis with acute respiratory failure requiring mechanical ventilation. Respiratory cultures show  Pseudomonas. ID has been consulted and started Avycaz. Pulmonary has also been consulted for ventilator management. Her course has been complicated by tracheostomy cuff leak; Pulmonary replaced the cuff 7/27. She is intermittently hypoxic and it has been difficult weaning the ventilator during her course (the patient was not chronically ventilated on admission). She also developed SVT for which scheduled metoprolol has been ordered. Cardiology was consulted.       Interval History: see "Hospital Course"    Review of Systems   Unable to perform ROS: Patient nonverbal     Objective:     Vital Signs (Most Recent):  Temp: (!) 95.5 °F (35.3 °C) (07/29/23 0710)  Pulse: 72 (07/29/23 " 0733)  Resp: (!) 26 (07/29/23 0732)  BP: 105/78 (07/29/23 0733)  SpO2: 96 % (07/29/23 0733) Vital Signs (24h Range):  Temp:  [95.5 °F (35.3 °C)-96.7 °F (35.9 °C)] 95.5 °F (35.3 °C)  Pulse:  [68-96] 72  Resp:  [0-46] 26  SpO2:  [89 %-99 %] 96 %  BP: ()/() 105/78     Weight: 92.7 kg (204 lb 5.9 oz)  Body mass index is 39.91 kg/m².    Intake/Output Summary (Last 24 hours) at 7/29/2023 1031  Last data filed at 7/29/2023 0928  Gross per 24 hour   Intake 875 ml   Output 1637 ml   Net -762 ml         Physical Exam  Vitals and nursing note reviewed.   Constitutional:       General: She is not in acute distress.     Appearance: She is ill-appearing.   HENT:      Head: Normocephalic and atraumatic.      Right Ear: External ear normal.      Left Ear: External ear normal.      Nose: Nose normal.      Mouth/Throat:      Mouth: Mucous membranes are moist.      Pharynx: Oropharynx is clear.   Eyes:      Extraocular Movements: Extraocular movements intact.      Conjunctiva/sclera: Conjunctivae normal.   Neck:      Comments: Tracheostomy.  Cardiovascular:      Rate and Rhythm: Normal rate and regular rhythm.      Pulses: Normal pulses.      Heart sounds: Normal heart sounds.   Pulmonary:      Breath sounds: Normal breath sounds.      Comments: MV.  Abdominal:      General: Bowel sounds are normal.      Palpations: Abdomen is soft.      Tenderness: There is no abdominal tenderness.      Comments: G tube.   Genitourinary:     Comments: Hamilton.  Musculoskeletal:         General: Normal range of motion.   Skin:     General: Skin is warm and dry.   Neurological:      Mental Status: Mental status is at baseline.             Significant Labs: All pertinent labs within the past 24 hours have been reviewed.    Significant Imaging: I have reviewed all pertinent imaging results/findings within the past 24 hours.      Assessment/Plan:      * Acute on chronic respiratory failure  -Continue Avycaz, end date 8/1  -CXR and ABG  PRN  -Pulmonary consulted    Tracheostomy malfunction  -Pulmonary managing      Paroxysmal SVT (supraventricular tachycardia)  -Telemetry  -Metoprolol 50 Q6H  -Cardiology consulted      Hepatic steatosis  Chronic. Stable.  -Trend LFTs      Obesity (BMI 30-39.9)  Body mass index is 39.91 kg/m². Morbid obesity complicates all aspects of disease management from diagnostic modalities to treatment.         Pressure injury of buttock, stage 2  -Wound Care consulted      Pneumonia of both lungs due to infectious organism  -ID consulted  -Continue Avycaz; end date 8/1      Difficult Hamilton catheter placement        Essential hypertension  -Continue to monitor      PEG (percutaneous endoscopic gastrostomy) status  -Care per Nursing      Tracheostomy dependence  -Care per RT      Persistent vegetative state  -Turn patient q2 hours  -Shower patient daily  -Consult Nutrition for diet recs    Anemia of chronic disease  -Trend Hgb with CBC        VTE Risk Mitigation (From admission, onward)         Ordered     enoxaparin injection 40 mg  Daily         07/23/23 0225     IP VTE HIGH RISK PATIENT  Once         07/23/23 0225     Place sequential compression device  Until discontinued         07/23/23 0225                Discharge Planning   [place]:     Code Status: Full Code   Is the patient medically ready for discharge?:     Reason for patient still in hospital (select all that apply): Patient trending condition, Treatment, Imaging, Consult recommendations and Pending disposition  Discharge Plan A: Return to nursing home                  Kirill Garcia MD  Department of Hospital Medicine   Formerly [facility]

## 2023-07-29 NOTE — CARE UPDATE
07/29/23 1202   PRE-TX-O2   Oxygen Concentration (%) 50  (WEANED TO 50% -)   SpO2 99 %   Pulse 72   Resp (!) 30   Vent Select   Charged w/in last 24h YES   Preset Conventional Ventilator Settings   Ventilation Type VC   Vent Mode A/C   Set Rate 24 BPM   Vt Set 360 mL   PEEP/CPAP 10 cmH20   Pressure Support 0 cmH20   Waveform RAMP   Peak Flow 55 L/min   Plateau Set/Insp. Hold (sec) 0   Trigger Sensitivity Flow/I-Trigger 2 L/min   Patient Ventilator Parameters   Resp Rate Total 31 br/min   Peak Airway Pressure 41 cmH20   Mean Airway Pressure 22 cmH20   Plateau Pressure 0 cmH20   Exhaled Vt 308 mL   Total Ve 12.6 L/m   I:E Ratio Measured 1:1.30   Conventional Ventilator Alarms   Resp Rate High Alarm 50 br/min   Press High Alarm 60 cmH2O   Apnea Rate 24   Apnea Volume (mL) 0 mL   Apnea Oxygen Concentration  100   Apnea Flow Rate (L/min) 60   T Apnea 25 sec(s)   Ready to Wean/Extubation Screen   FIO2<=50 (chart decimal) 0.5   MV<16L (chart vol.) 12.6   PEEP <=8 (chart #) (!) 10

## 2023-07-29 NOTE — PLAN OF CARE
Seems to oxygenate better when laying on left side. Had had to increase fio2 to 65 while lying on right side. With a small amount of white - clear secretions.    Patient found in bed, on ventilator. She is awake and alert but otherwise just staring around the room, not moving extremities, making eye contact or otherwise following commands.     Shiely xlt 8.0 trach in place, still positional with small cuff leak depending on her head and neck position but better than previously.     The abdomen is rounded and a bit tight but with hyperactive bowel sounds.     The carroll is draining clear yellow urine with scant amount of sediment.     I am repleting potassium now.     Tube feeding glucerna 1.5 is infusing at 30ml/hr (goal) per kangaroo pump, patient tolerating with residual I could only get about 5-10ml, no emesis. The external bumper was a bit tight given patient's habitus and abdominal distention and so adjusted the bumper (tube at 3cm).     Checked midline to left upper arm, aspirates and draws back, site wnl.     Mepielxes in place to bilat heels and sacrum, on waffle overlay.     All extremities are stiffened with decreased range of motion and contractures.     The bed is low and alarm on. Clinical alarms reviewed and armed. Monitor strip printed and reviewed, sinus rhythm 80s, did not appreciate any SVT yet (Dr Estrada saw patient yesterday), with rare ventricular ectopy / pvcs. Metoprolol was held yesterday evening d/t ongoing borderline low blood pressure. Turning q2h, lines and extremities padded. SCDs in place / on. CHG oral care and CHG pericare done. The skin is dry and flaky.    Peak pressure through ventilator remains in 40s.  ...  Started additional IV to right forearm with ultrasound 20 gauge extended cath with aseptic technique x1 attempt, confirmed catheter in vein with ultrasound and aspirates blood.    Changed mepilex to left buttock wound (cleaned with chg soap, dried (shallow ulcer, as depicted in  picture previously).    Changed dressing to left midline with aseptic technique. I confirmed placement is correct, catheter visualized in vein and visualized flush with ultrasound.

## 2023-07-29 NOTE — PROGRESS NOTES
07/29/2023      Admit Date: 7/22/2023  Genna Felix  Pulmonary and Critical Care Inpatient Progress Note    Chief Complaint   Patient presents with    Shortness of Breath     Arrived from [place] via [facility] with c/c SOB that started earlier today       History of Present Illness:  Pt is a 63 yo female with hepatic steatosis, PE, GERD, anoxic brain injury dependent on trach/vent and PEG who presented to the ED with desaturation. She was found to have pneumonia and UTI. Pulmonary is consulted for vent management. She is hypothermic requiring warming blanket today. Hx is gathered from chart review. There is no family at bedside.    7/24/23: Minimal urine output. Unable to place Hamilton. Obtaining bladder scan -- unable to quantify, but full of fluid. Re-attempting Hamilton.    7/25/23: Stable.    7/26/23: Respiratory acidosis this morning and patient noted to have some increased respiratory effort. There was an audible leak around the tracheostomy tube, which resolved when I more fully inflated the cuff.    7/27/23: Stable this morning. ABG with mild metabolic alkalosis, likely due to increased bicarbonate which was compensatory for her recent hypercapnia, but she is now tachypneic with a normal PaCO2.    7/28/23: Developed SVT yesterday afternoon with subsequent acute worsening of hypoxia, which improved when heart rate improved.    7/29/23: Hypoxic again yesterday afternoon, but less so this morning. CTA last night without PE but with findings of PNA and possibly pulmonary edema.      PFSH:  Past Medical History:   Diagnosis Date    Anoxic brain damage 07/2020    Bedbound 07/2020    Cardiac arrest 07/2020    Cirrhosis     GERD (gastroesophageal reflux disease)     Hemangioma of intra-abdominal structure     Hypertension     Nursing home resident     Protein calorie malnutrition     Pulmonary embolus     Respiratory distress     S/P percutaneous endoscopic gastrostomy (PEG) tube placement 07/2020    Total self-care  deficit 07/2020    Tracheostomy dependence     7/2020     Past Surgical History:   Procedure Laterality Date    PEG W/TRACHEOSTOMY PLACEMENT  07/27/2020    SKIN GRAFT      buttocks     Social History     Tobacco Use    Smoking status: Never    Smokeless tobacco: Never   Substance Use Topics    Alcohol use: Not Currently    Drug use: Not Currently     Family History   Problem Relation Age of Onset    Hypertension Mother     No Known Problems Father      Review of patient's allergies indicates:  No Known Allergies    Performance Status:Performance Status:The patient's activity level is bedbound.    Review of Systems:  due to neurologic status/impairments a     Exam:Comprehensive exam done. BP (!) 93/59   Pulse 63   Temp 96.7 °F (35.9 °C) (Axillary)   Resp (!) 24   Ht 5' (1.524 m)   Wt 92.7 kg (204 lb 5.9 oz)   SpO2 (!) 94%   Breastfeeding No   BMI 39.91 kg/m²       PHYSICAL EXAM  GENERAL:  NAD.  HEENT: EOMI. PERRLA. Opens eyes spontaneously, but no response to confrontation.  NECK: No cervical adenopathy palpated. No JVD or HJR.  HEART: Regular rate and rhythm. No murmur or gallop auscultated.  LUNGS: No crackles or wheezing on anterior auscultation. Audible cuff leak.  ABDOMEN: Bowel sounds present. Soft, non-tender, non-distended.  EXTREMITIES: Normal muscle tone and joint movement, no cyanosis or clubbing.   LYMPHATICS: No adenopathy palpated, no edema.  SKIN: Dry, intact, no lesions.   NEURO: Unresponsive.  PSYCH: Unable to assess.    Imaging Results              CTA Chest Abdomen Pelvis (Final result)  Result time 07/22/23 22:24:45      Final result by Danny Rasmussen DO (07/22/23 22:24:45)                   Narrative:    PROCEDURE:  CT Angiography Chest, Abdomen and Pelvis With Intravenous Contrast    CLINICAL INDICATION:  The patient is 62 years old and is Female; respiratory distress    TECHNIQUE:  Axial computed tomographic angiography images of the chest, abdomen and pelvis with intravenous  contrast.  This CT exam was performed using one or more of the following dose reduction techniques:  automated exposure control, adjustment of the mA and/or kV according to patient size, and/or use of iterative reconstruction technique.  MIP reconstructed images were created and reviewed.    DLP: 955 mGycm    CONTRAST:  100mL of Omnipaque 350 was administered intravenously.    COMPARISON:  Chest radiograph of the same day and CT abdomen and pelvis dated 7/6/2023.    FINDINGS:    VASCULATURE:  AORTA:  No acute findings.  No aortic aneurysm.  No dissection.  PULMONARY ARTERIES:  Unremarkable as visualized.  No pulmonary embolism is identified.  GREAT VESSELS OF AORTIC ARCH:  No acute findings.  No dissection.  No arterial occlusion or significant stenosis.  CELIAC TRUNK AND MESENTERIC ARTERIES:  No acute findings.  No occlusion or significant stenosis.  RENAL ARTERIES:  No acute findings.  No occlusion or significant stenosis.  ILIAC ARTERIES:  No acute findings.  No occlusion or significant stenosis.    CHEST:  LUNGS:  Bilateral interstitial opacities with interlobular septal thickening and consolidation in the right lower lobe.  PLEURAL SPACE:  Small bilateral pleural effusions.  No pneumothorax.  HEART:  Unremarkable.  No cardiomegaly.  No significant pericardial effusion.  MEDIASTINUM:  Mediastinal and bihilar lymphadenopathy.    ABDOMEN:  LIVER:  Hepatic steatosis.  GALLBLADDER AND BILE DUCTS:  Unremarkable.  No calcified stones.  No ductal dilation.  PANCREAS:  Unremarkable.  No ductal dilation.  No mass.  SPLEEN:  Splenomegaly.  ADRENALS:  Unremarkable.  No mass.  KIDNEYS AND URETERS:  Unremarkable.  No hydronephrosis.  No solid mass.  STOMACH AND BOWEL:  Unremarkable.  No obstruction.  No mucosal thickening.    PELVIS:  APPENDIX:  No findings to suggest acute appendicitis.  BLADDER:  Bladder is decompressed.  REPRODUCTIVE:  Prior hysterectomy.    CHEST, ABDOMEN and PELVIS:  INTRAPERITONEAL SPACE:   Unremarkable.  No significant fluid collection.  No free air.  BONES/JOINTS:  Severe heterotopic bone formation arising from the left acetabulum. Milder similar-appearing finding on the right. Advanced left shoulder degenerative changes.  Diffuse osteopenia. Multilevel degenerative changes.  No acute fracture.  No dislocation.  SOFT TISSUES:  Fat-containing umbilical hernia.  LYMPH NODES:  Unremarkable.  No enlarged lymph nodes.  TUBES, LINES AND DEVICES:  Partially evaluated gastrostomy and tracheostomy tubes.    IMPRESSION:    1.  No acute aortic abnormality or pulmonary embolism.    2.  Bilateral interstitial opacities with interlobular septal thickening and consolidation in the right lower lobe.  Imaging features can be seen with COVID pneumonia, though are nonspecific and can occur with a variety of infectious and noninfectious processes. PneInd.    3.  Mediastinal and bihilar lymphadenopathy.    4.  Small bilateral pleural effusions.    5.  No acute abdominal or pelvic abnormality.    6.  Splenomegaly.    7.  Hepatic steatosis.    Electronically signed by:  Danny Rasmussen DO  7/22/2023 10:24 PM CDT Workstation: [id number]                                     X-Ray Chest AP Portable (Final result)  Result time 07/22/23 19:49:01      Final result by Jayant Junior MD (07/22/23 19:49:01)                   Narrative:    CLINICAL DATA:  Sepsis and shortness of breath    TECHNIQUE:  Views: A single AP view.  Limitations: The images are technically satisfactory.    COMPARISON:  July 8, 2023    FINDINGS:  CARDIOVASCULAR: Normal.    LUNGS AND PLEURA:  1. Infiltrative changes are seen diffusely throughout both lungs. Allowing for technical differences these have increased slightly since last examination.    MEDIASTINAL AND HILAR STRUCTURES: Normal.    OSSEOUS STRUCTURES: Normal.    ADDITIONAL FINDINGS:  1. The tracheostomy tube remains in good position.    IMPRESSION:    1.  Slight increasing infiltrates in the  lungs.    This document was created using a voice recognition transcribing system. Incorrect words or phrases may have been missed during proof reading. Please interpret accordingly or contact the radiologist for clarification if necessary.    Electronically signed by:  Jayant Junior MD  7/22/2023 7:49 PM CDT Workstation: [id number]                                        Labs     Recent Labs   Lab 07/29/23  0404 07/29/23  0550   WBC 7.78  --    HGB 7.9*  --    HCT 27.1* 27*     --      Recent Labs   Lab 07/29/23  0404      K 3.9      CO2 31*   BUN 17   CREATININE <0.3*      CALCIUM 8.8   MG 1.9   PHOS 3.0   AST 16   ALT 24   ALKPHOS 134   BILITOT 1.3*   PROT 7.8   ALBUMIN 2.9*     Recent Labs   Lab 07/29/23  0550   PH 7.448   PCO2 46.0*   PO2 72*   HCO3 31.9*       Microbiology Results (last 7 days)       Procedure Component Value Units Date/Time    Culture, Respiratory with Gram Stain [[id number]]  (Abnormal)  (Susceptibility) Collected: 07/24/23 1148    Order Status: Completed Specimen: Respiratory from Sputum Updated: 07/29/23 1039     Respiratory Culture PSEUDOMONAS AERUGINOSA   Many  MDRO  Isolate sent out for reference testing       Gram Stain (Respiratory) <10 epithelial cells per low power field.     Gram Stain (Respiratory) Many WBC's     Gram Stain (Respiratory) Moderate Gram negative rods    Culture, ID (Consult) [[id number]]  (Abnormal)  (Susceptibility) Collected: 07/24/23 1148    Order Status: Completed Specimen: Respiratory from Sputum Updated: 07/29/23 0912     Culture, Identification (consult) PSEUDOMONAS AERUGINOSA     Blood culture x two cultures. Draw prior to antibiotics. [[id number]] Collected: 07/22/23 2044    Order Status: Completed Specimen: Blood Updated: 07/27/23 2232     Blood Culture, Routine No growth after 5 days.    Narrative:      Aerobic and anaerobic  Collection has been rescheduled by 2MB at 07/22/2023 19:43 Reason: Amairani liz said she will  call when she wants cultures drawn  Collection has been rescheduled by 2MB at 07/22/2023 19:43 Reason: Amairani liz said she will call when she wants cultures drawn    Blood culture x two cultures. Draw prior to antibiotics. [[id number]] Collected: 07/22/23 2050    Order Status: Completed Specimen: Blood Updated: 07/27/23 2232     Blood Culture, Routine No growth after 5 days.    Narrative:      Aerobic and anaerobic  Collection has been rescheduled by 2MB at 07/22/2023 19:43 Reason: Amairani liz said she will call when she wants cultures drawn  Collection has been rescheduled by 2MB at 07/22/2023 19:43 Reason: Amairani liz said she will call when she wants cultures drawn    Urine culture [[id number]] Collected: 07/23/23 1602    Order Status: Completed Specimen: Urine Updated: 07/26/23 0703     Urine Culture, Routine No growth    Narrative:      Specimen Source->Urine    MRSA Screen by PCR [[id number]] Collected: 07/23/23 1610    Order Status: Completed Specimen: Nasopharyngeal Swab from Nasal Updated: 07/23/23 1750     MRSA SCREEN BY PCR Negative          CXR 7/25/23: Dense opacifications along the lateral periphery of the left lung, which could represent a large pneumonia; there are also diffuse bilateral alveolar opacities otherwise. This is similar to 7/21/23, but the left opacities are more prominent.    CXR 7/26/23: Similar to prior with diffuse, hazy, bilateral opacities, R>L.    CXR 7/28/23: Stable bilateral hazy, patchy alveolar opacities, R>L.    CTA chest 7/28/23:  no PE, bilateral dense and groundglass patchy opacities with septal thickening more prominent on right and posteriorly, likely representing multifocal pneumonia with a possible element of pulmonary edema. There are small bilateral pleural effusions.    Impression and Plan:  Active Hospital Problems    Diagnosis  POA    *Acute on chronic respiratory failure [J96.20]  Yes    Paroxysmal SVT (supraventricular tachycardia) [I47.1]  Yes     Tracheostomy malfunction [J95.03]  Yes    Obesity (BMI 30-39.9) [E66.9]  Yes    Hepatic steatosis [K76.0]  Yes    Pressure injury of buttock, stage 2 [L89.302]  Yes    Pneumonia of both lungs due to infectious organism [J18.9]  Yes    Essential hypertension [I10]  Yes    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]  Not Applicable    PEG (percutaneous endoscopic gastrostomy) status [Z93.1]  Not Applicable    Anemia of chronic disease [D63.8]  Yes    Persistent vegetative state [R40.3]  Yes      Resolved Hospital Problems   No resolved problems to display.     #Acute on chronic hypoxemic/hypercapneic resp failure  #Vegetative state with trach/vent and PEG-dependence  #Compensated hypercapnia  #Mild metabolic alkalosis  #Normocytic anemia  - continue vent  - ABG qAM  - continue airway clearance regimen  - continue feeds via PEG    #Ventilator-associated pneumonia due to /MDRO P. aeruginosa  #UTI, complicated  - continue antibiotics as per ID    #Leak around tracheostomy tube, positional  Trach tube exchanged 7/27 to [facility] 8 XLT, but this did not fix the problem.  - position as needed to minimize air leak  - inflate cuff as needed within safe parameters    #Hypoxemia, labile  Likely due to pneumonia and exacerbated by SVT. Bedside echo 7/27 showed small, collapsible IVC, so fluid overload less likely.  - wean O2 as tolerated for SpO2 90% or greater  - BNP added to AM labs --> may diurese if elevated    #Decreased lung and/or chest wall compliance  Peak and plateau pressures have been elevated (Ppeak 35-50s) on most measurements during this admission. I suspect this is from decreased chest wall compliance from contractures and obesity.  - no acute intervention    #SVT  - agree with metoprolol  - appreciate cardiology consultation    Upon my evaluation, this patient had a high probability of imminent or life-threatening deterioration, which required my direct attention, intervention, and personal management.    I have personally  provided at least 35 minutes of critical care time exclusive of time spent on separately billable procedures. Over 50% of the time of this encounter was spent in direct care at the bedside. Time includes review of laboratory data, radiology results, discussion with consultants, and monitoring for potential decompensation. Interventions were performed as documented above.    Yonas Ritchie MD  Pulmonary and Critical Care Medicine  [facility]  Date of Service: 07/29/2023  2:16 PM

## 2023-07-29 NOTE — CARE UPDATE
07/29/23 1434   Patient Assessment/Suction   Level of Consciousness (AVPU) alert   Respiratory Effort Normal;Unlabored   Rhythm/Pattern, Respiratory assisted mechanically;unlabored   Cough Frequency with stimulation   Cough Type good   Suction Method tracheal   $ Suction Charges Inline Suction Procedure Stat Charge   Secretions Amount small   Secretions Color white   PRE-TX-O2   Device (Oxygen Therapy) ventilator   Oxygen Concentration (%) 50   SpO2 97 %   Pulse 71   Adult Surgical Airway 07/27/23 1814 Shiley 8.0 mm DIC cuffed Extra Large Cuffed Distal 8.0 / 85 mm 8.0   Placement Date/Time: 07/27/23 1814   Present Prior to Hospital Arrival?: No  Inserted by: MD  Placed By: ICU physician  Type: Tracheostomy  Brand: Shiley  Airway Device Size and Style: 8.0 mm DIC cuffed  Airway Device Style: Extra Large Cuffed Distal ...   Cuff Pressure   (NANCY CUFF DEVICE - GREEN SKYLAR)   Vent Select   Conventional Vent Y   Charged w/in last 24h YES   Preset Conventional Ventilator Settings   Vent ID 2   Ventilation Type VC   Vent Mode A/C   Set Rate 24 BPM   Vt Set 360 mL   PEEP/CPAP 8 cmH20   Pressure Support 0 cmH20   Waveform RAMP   Peak Flow 55 L/min   Plateau Set/Insp. Hold (sec) 0   Trigger Sensitivity Flow/I-Trigger 2 L/min   Patient Ventilator Parameters   Resp Rate Total 31 br/min   Peak Airway Pressure 38 cmH20   Mean Airway Pressure 21 cmH20   Plateau Pressure 0 cmH20   Exhaled Vt 326 mL   Total Ve 12.1 L/m   I:E Ratio Measured 1:1.40   Conventional Ventilator Alarms   Alarms On Y   Ve High Alarm 23 L/min   Ve Low Alarm 3 L/min   Vt High Alarm 1200 mL   Vt Low Alarm 200 mL   Resp Rate High Alarm 50 br/min   Press High Alarm 60 cmH2O   Apnea Rate 24   Apnea Volume (mL) 0 mL   Apnea Oxygen Concentration  100   Apnea Flow Rate (L/min) 60   T Apnea 25 sec(s)   Ready to Wean/Extubation Screen   FIO2<=50 (chart decimal) 0.5   MV<16L (chart vol.) 12.1   PEEP <=8 (chart #) 8     WEAN PEEP TO 8

## 2023-07-29 NOTE — SUBJECTIVE & OBJECTIVE
"Interval History: see "Hospital Course"    Review of Systems   Unable to perform ROS: Patient nonverbal     Objective:     Vital Signs (Most Recent):  Temp: (!) 95.5 °F (35.3 °C) (07/29/23 0710)  Pulse: 72 (07/29/23 0733)  Resp: (!) 26 (07/29/23 0732)  BP: 105/78 (07/29/23 0733)  SpO2: 96 % (07/29/23 0733) Vital Signs (24h Range):  Temp:  [95.5 °F (35.3 °C)-96.7 °F (35.9 °C)] 95.5 °F (35.3 °C)  Pulse:  [68-96] 72  Resp:  [0-46] 26  SpO2:  [89 %-99 %] 96 %  BP: ()/() 105/78     Weight: 92.7 kg (204 lb 5.9 oz)  Body mass index is 39.91 kg/m².    Intake/Output Summary (Last 24 hours) at 7/29/2023 1031  Last data filed at 7/29/2023 0928  Gross per 24 hour   Intake 875 ml   Output 1637 ml   Net -762 ml         Physical Exam  Vitals and nursing note reviewed.   Constitutional:       General: She is not in acute distress.     Appearance: She is ill-appearing.   HENT:      Head: Normocephalic and atraumatic.      Right Ear: External ear normal.      Left Ear: External ear normal.      Nose: Nose normal.      Mouth/Throat:      Mouth: Mucous membranes are moist.      Pharynx: Oropharynx is clear.   Eyes:      Extraocular Movements: Extraocular movements intact.      Conjunctiva/sclera: Conjunctivae normal.   Neck:      Comments: Tracheostomy.  Cardiovascular:      Rate and Rhythm: Normal rate and regular rhythm.      Pulses: Normal pulses.      Heart sounds: Normal heart sounds.   Pulmonary:      Breath sounds: Normal breath sounds.      Comments: MV.  Abdominal:      General: Bowel sounds are normal.      Palpations: Abdomen is soft.      Tenderness: There is no abdominal tenderness.      Comments: G tube.   Genitourinary:     Comments: Hamilton.  Musculoskeletal:         General: Normal range of motion.   Skin:     General: Skin is warm and dry.   Neurological:      Mental Status: Mental status is at baseline.             Significant Labs: All pertinent labs within the past 24 hours have been " reviewed.    Significant Imaging: I have reviewed all pertinent imaging results/findings within the past 24 hours.

## 2023-07-29 NOTE — RESPIRATORY THERAPY
07/28/23 1905   Patient Assessment/Suction   Respiratory Effort Unlabored   Expansion/Accessory Muscles/Retractions no use of accessory muscles   All Lung Fields Breath Sounds coarse   Rhythm/Pattern, Respiratory assisted mechanically   Cough Frequency with stimulation   Cough Type assisted   Suction Method oral;tracheal   Suction Pressure (mmHg) -120 mmHg   $ Suction Charges Inline Suction Procedure Stat Charge   Secretions Amount small   Secretions Color white   Secretions Characteristics thin   Skin Integrity   $ Wound Care Tech Time 15 min   Area Observed Neck;Neck under tracheostomy   Skin Appearance without discoloration   Barrier used?   (Drain sponge)   Barrier Changed? Yes   PRE-TX-O2   Device (Oxygen Therapy) ventilator   $ Is the patient on Low Flow Oxygen? Yes   Pulse Oximetry Type Continuous   $ Pulse Oximetry - Multiple Charge Pulse Oximetry - Multiple   Adult Surgical Airway Shiley Cuffed 8.0 / 85 mm   No placement date or time found.   Present Prior to Hospital Arrival?: Yes  Type: Tracheostomy  Brand: Shiley  Airway Device Style: Cuffed  Airway Device Size: 8.0 / 85 mm  Removal Indication and Assessment: removed by MD   Cuff Inflation? Inflated   Speaking Valve Usage Not wearing   Status Secured   Site Assessment Clean;Dry   Site Care Dressing applied   Inner Cannula Care Changed/new   Ties Assessment Clean;Dry;Intact;Secure   Airway Safety   Is Ambu Bag and Mask with Patient? Yes, Adult Ambu Bag and Mask   Suction set is at the bedside? Yes   Extra trach at bedside? Yes   Extra trach sizes at bedside? 8   Is Obturator Available? Yes   Location of Obturator?  Bedside table   Respiratory Interventions   Airway/Ventilation Management airway patency maintained   Vent Select   Conventional Vent Y   Charged w/in last 24h YES   Preset Conventional Ventilator Settings   Vent ID 2   Vent Type    Ventilation Type VC   Vent Mode A/C   Humidity HME   Set Rate 24 BPM   Vt Set 360 mL   PEEP/CPAP 10  cmH20   Waveform RAMP   Peak Flow 66 L/min   Trigger Sensitivity Flow/I-Trigger 3 L/min   Patient Ventilator Parameters   Resp Rate Total 39 br/min   Peak Airway Pressure 38 cmH20   Mean Airway Pressure 21 cmH20   Exhaled Vt 344 mL   Total Ve 13 L/m   Tubing ID (mm) 8 mm   Conventional Ventilator Alarms   Alarms On Y   Ready to Wean/Extubation Screen   MV<16L (chart vol.) 13   PEEP <=8 (chart #) (!) 10   Education   $ Education Suction;Trach Care;Ventilator Oxygen;15 min   Transport Patient   $ Transport Tech Time Charge 30 min   Time to transport 25   Oxygen Method Transport Vent   Transport to CT   Toleration Good   Ambu and mask at bedside Yes   Respiratory Evaluation   $ Care Plan Tech Time 15 min   $ Eval/Re-eval Charges Re-evaluation

## 2023-07-30 PROBLEM — S43.004A DISLOCATION OF RIGHT SHOULDER JOINT: Status: ACTIVE | Noted: 2021-04-22

## 2023-07-30 LAB
ALBUMIN SERPL BCP-MCNC: 3 G/DL (ref 3.5–5.2)
ALLENS TEST: ABNORMAL
ALP SERPL-CCNC: 120 U/L (ref 55–135)
ALT SERPL W/O P-5'-P-CCNC: 22 U/L (ref 10–44)
ANION GAP SERPL CALC-SCNC: 7 MMOL/L (ref 8–16)
AST SERPL-CCNC: 18 U/L (ref 10–40)
BASOPHILS # BLD AUTO: 0.02 K/UL (ref 0–0.2)
BASOPHILS NFR BLD: 0.2 % (ref 0–1.9)
BILIRUB SERPL-MCNC: 0.9 MG/DL (ref 0.1–1)
BUN SERPL-MCNC: 27 MG/DL (ref 6–20)
CALCIUM SERPL-MCNC: 9 MG/DL (ref 8.7–10.5)
CHLORIDE SERPL-SCNC: 102 MMOL/L (ref 95–110)
CO2 SERPL-SCNC: 29 MMOL/L (ref 23–29)
CREAT SERPL-MCNC: <0.3 MG/DL (ref 0.5–1.4)
DELSYS: ABNORMAL
DIFFERENTIAL METHOD: ABNORMAL
EOSINOPHIL # BLD AUTO: 0.5 K/UL (ref 0–0.5)
EOSINOPHIL NFR BLD: 5.7 % (ref 0–8)
ERYTHROCYTE [DISTWIDTH] IN BLOOD BY AUTOMATED COUNT: 22.2 % (ref 11.5–14.5)
ERYTHROCYTE [SEDIMENTATION RATE] IN BLOOD BY WESTERGREN METHOD: 24 MM/H
EST. GFR  (NO RACE VARIABLE): >60 ML/MIN/1.73 M^2
FIO2: 50
GLUCOSE SERPL-MCNC: 106 MG/DL (ref 70–110)
GLUCOSE SERPL-MCNC: 92 MG/DL (ref 70–110)
HCO3 UR-SCNC: 33.8 MMOL/L (ref 24–28)
HCT VFR BLD AUTO: 27.9 % (ref 37–48.5)
HCT VFR BLD CALC: 34 %PCV (ref 36–54)
HGB BLD-MCNC: 8.1 G/DL (ref 12–16)
IMM GRANULOCYTES # BLD AUTO: 0.03 K/UL (ref 0–0.04)
IMM GRANULOCYTES NFR BLD AUTO: 0.3 % (ref 0–0.5)
LYMPHOCYTES # BLD AUTO: 2 K/UL (ref 1–4.8)
LYMPHOCYTES NFR BLD: 23.6 % (ref 18–48)
MAGNESIUM SERPL-MCNC: 1.6 MG/DL (ref 1.6–2.6)
MCH RBC QN AUTO: 26.6 PG (ref 27–31)
MCHC RBC AUTO-ENTMCNC: 29 G/DL (ref 32–36)
MCV RBC AUTO: 92 FL (ref 82–98)
MODE: ABNORMAL
MONOCYTES # BLD AUTO: 0.6 K/UL (ref 0.3–1)
MONOCYTES NFR BLD: 7 % (ref 4–15)
NEUTROPHILS # BLD AUTO: 5.4 K/UL (ref 1.8–7.7)
NEUTROPHILS NFR BLD: 63.2 % (ref 38–73)
NRBC BLD-RTO: 0 /100 WBC
PCO2 BLDA: 50.2 MMHG (ref 35–45)
PEEP: 8
PH SMN: 7.44 [PH] (ref 7.35–7.45)
PHOSPHATE SERPL-MCNC: 2.9 MG/DL (ref 2.7–4.5)
PLATELET # BLD AUTO: 223 K/UL (ref 150–450)
PMV BLD AUTO: 11.5 FL (ref 9.2–12.9)
PO2 BLDA: 73 MMHG (ref 80–100)
POC BE: 10 MMOL/L
POC IONIZED CALCIUM: 1.24 MMOL/L (ref 1.06–1.42)
POC SATURATED O2: 95 % (ref 95–100)
POC TCO2: 35 MMOL/L (ref 23–27)
POTASSIUM BLD-SCNC: 4.1 MMOL/L (ref 3.5–5.1)
POTASSIUM SERPL-SCNC: 4.1 MMOL/L (ref 3.5–5.1)
PROT SERPL-MCNC: 8.2 G/DL (ref 6–8.4)
RBC # BLD AUTO: 3.05 M/UL (ref 4–5.4)
SAMPLE: ABNORMAL
SITE: ABNORMAL
SODIUM BLD-SCNC: 139 MMOL/L (ref 136–145)
SODIUM SERPL-SCNC: 138 MMOL/L (ref 136–145)
VT: 360
WBC # BLD AUTO: 8.59 K/UL (ref 3.9–12.7)

## 2023-07-30 PROCEDURE — 99900026 HC AIRWAY MAINTENANCE (STAT)

## 2023-07-30 PROCEDURE — 20000000 HC ICU ROOM

## 2023-07-30 PROCEDURE — 85025 COMPLETE CBC W/AUTO DIFF WBC: CPT | Performed by: FAMILY MEDICINE

## 2023-07-30 PROCEDURE — 25000003 PHARM REV CODE 250: Performed by: STUDENT IN AN ORGANIZED HEALTH CARE EDUCATION/TRAINING PROGRAM

## 2023-07-30 PROCEDURE — 25000003 PHARM REV CODE 250: Performed by: FAMILY MEDICINE

## 2023-07-30 PROCEDURE — 63600175 PHARM REV CODE 636 W HCPCS: Mod: JZ,JG | Performed by: STUDENT IN AN ORGANIZED HEALTH CARE EDUCATION/TRAINING PROGRAM

## 2023-07-30 PROCEDURE — 80053 COMPREHEN METABOLIC PANEL: CPT | Performed by: FAMILY MEDICINE

## 2023-07-30 PROCEDURE — 82803 BLOOD GASES ANY COMBINATION: CPT

## 2023-07-30 PROCEDURE — 25000003 PHARM REV CODE 250: Performed by: INTERNAL MEDICINE

## 2023-07-30 PROCEDURE — 83735 ASSAY OF MAGNESIUM: CPT | Performed by: FAMILY MEDICINE

## 2023-07-30 PROCEDURE — 94799 UNLISTED PULMONARY SVC/PX: CPT

## 2023-07-30 PROCEDURE — 94640 AIRWAY INHALATION TREATMENT: CPT

## 2023-07-30 PROCEDURE — 25000242 PHARM REV CODE 250 ALT 637 W/ HCPCS: Performed by: INTERNAL MEDICINE

## 2023-07-30 PROCEDURE — 84100 ASSAY OF PHOSPHORUS: CPT | Performed by: FAMILY MEDICINE

## 2023-07-30 PROCEDURE — 99291 PR CRITICAL CARE, E/M 30-74 MINUTES: ICD-10-PCS | Mod: ,,, | Performed by: INTERNAL MEDICINE

## 2023-07-30 PROCEDURE — 94761 N-INVAS EAR/PLS OXIMETRY MLT: CPT

## 2023-07-30 PROCEDURE — 84132 ASSAY OF SERUM POTASSIUM: CPT

## 2023-07-30 PROCEDURE — 27000221 HC OXYGEN, UP TO 24 HOURS

## 2023-07-30 PROCEDURE — 63600175 PHARM REV CODE 636 W HCPCS: Performed by: STUDENT IN AN ORGANIZED HEALTH CARE EDUCATION/TRAINING PROGRAM

## 2023-07-30 PROCEDURE — 99900035 HC TECH TIME PER 15 MIN (STAT)

## 2023-07-30 PROCEDURE — 85014 HEMATOCRIT: CPT

## 2023-07-30 PROCEDURE — 36415 COLL VENOUS BLD VENIPUNCTURE: CPT | Performed by: FAMILY MEDICINE

## 2023-07-30 PROCEDURE — 99291 CRITICAL CARE FIRST HOUR: CPT | Mod: ,,, | Performed by: INTERNAL MEDICINE

## 2023-07-30 PROCEDURE — 82330 ASSAY OF CALCIUM: CPT

## 2023-07-30 PROCEDURE — 94003 VENT MGMT INPAT SUBQ DAY: CPT

## 2023-07-30 PROCEDURE — 84295 ASSAY OF SERUM SODIUM: CPT

## 2023-07-30 PROCEDURE — 99900031 HC PATIENT EDUCATION (STAT)

## 2023-07-30 PROCEDURE — 36600 WITHDRAWAL OF ARTERIAL BLOOD: CPT

## 2023-07-30 RX ORDER — ENOXAPARIN SODIUM 100 MG/ML
40 INJECTION SUBCUTANEOUS EVERY 12 HOURS
Status: DISCONTINUED | OUTPATIENT
Start: 2023-07-30 | End: 2023-08-01 | Stop reason: HOSPADM

## 2023-07-30 RX ADMIN — CARBOXYMETHYLCELLULOSE SODIUM 1 DROP: 5 SOLUTION/ DROPS OPHTHALMIC at 08:07

## 2023-07-30 RX ADMIN — CARBOXYMETHYLCELLULOSE SODIUM 1 DROP: 5 SOLUTION/ DROPS OPHTHALMIC at 12:07

## 2023-07-30 RX ADMIN — MICONAZOLE NITRATE: 20 CREAM TOPICAL at 12:07

## 2023-07-30 RX ADMIN — LEVALBUTEROL HYDROCHLORIDE 1.25 MG: 1.25 SOLUTION RESPIRATORY (INHALATION) at 03:07

## 2023-07-30 RX ADMIN — DEXTROSE MONOHYDRATE 2.5 G: 50 INJECTION, SOLUTION INTRAVENOUS at 01:07

## 2023-07-30 RX ADMIN — CARBOXYMETHYLCELLULOSE SODIUM 1 DROP: 5 SOLUTION/ DROPS OPHTHALMIC at 09:07

## 2023-07-30 RX ADMIN — DEXTROSE MONOHYDRATE 2.5 G: 50 INJECTION, SOLUTION INTRAVENOUS at 08:07

## 2023-07-30 RX ADMIN — MICONAZOLE NITRATE: 20 CREAM TOPICAL at 08:07

## 2023-07-30 RX ADMIN — ENOXAPARIN SODIUM 40 MG: 100 INJECTION SUBCUTANEOUS at 09:07

## 2023-07-30 RX ADMIN — Medication 4000 UNITS: at 11:07

## 2023-07-30 RX ADMIN — CARBOXYMETHYLCELLULOSE SODIUM 1 DROP: 5 SOLUTION/ DROPS OPHTHALMIC at 05:07

## 2023-07-30 RX ADMIN — LEVALBUTEROL HYDROCHLORIDE 1.25 MG: 1.25 SOLUTION RESPIRATORY (INHALATION) at 07:07

## 2023-07-30 RX ADMIN — DEXTROSE MONOHYDRATE 2.5 G: 50 INJECTION, SOLUTION INTRAVENOUS at 05:07

## 2023-07-30 NOTE — PLAN OF CARE
07/29/23 1946   Patient Assessment/Suction   Level of Consciousness (AVPU) unresponsive   Suction Method tracheal   $ Suction Charges Inline Suction Procedure Stat Charge   Secretions Amount small   Secretions Color white   Secretions Characteristics thick   PRE-TX-O2   Device (Oxygen Therapy) ventilator   $ Is the patient on Low Flow Oxygen? Yes   Oxygen Concentration (%) 50   SpO2 96 %   Pulse Oximetry Type Continuous   $ Pulse Oximetry - Multiple Charge Pulse Oximetry - Multiple   Pulse 78   Resp (!) 33   Adult Surgical Airway 07/27/23 1814 Shiley 8.0 mm DIC cuffed Extra Large Cuffed Distal 8.0 / 85 mm 8.0   Placement Date/Time: 07/27/23 1814   Present Prior to Hospital Arrival?: No  Inserted by: MD  Placed By: ICU physician  Type: Tracheostomy  Brand: Shiley  Airway Device Size and Style: 8.0 mm DIC cuffed  Airway Device Style: Extra Large Cuffed Distal ...   Status Secured   Site Assessment Dry;Clean   Site Care Dried;Cleansed;Dressing applied   Inner Cannula Care Changed/new   Ties Assessment Clean;Dry;Intact   Vent Select   Charged w/in last 24h YES   Preset Conventional Ventilator Settings   Vent Type    Ventilation Type VC   Vent Mode A/C   Humidity HME   Set Rate 24 BPM   Vt Set 360 mL   PEEP/CPAP 8 cmH20   Pressure Support 0 cmH20   Waveform RAMP   Peak Flow 50 L/min   Plateau Set/Insp. Hold (sec) 0   Trigger Sensitivity Flow/I-Trigger 2 L/min   Patient Ventilator Parameters   Resp Rate Total 33 br/min   Peak Airway Pressure 37 cmH20   Mean Airway Pressure 21 cmH20   Plateau Pressure 0 cmH20   Exhaled Vt 362 mL   Total Ve 13.4 L/m   I:E Ratio Measured 1:1.30   Conventional Ventilator Alarms   Alarms On Y   Ve High Alarm 22 L/min   Ve Low Alarm 3 L/min   Vt High Alarm 1200 mL   Vt Low Alarm 200 mL   Resp Rate High Alarm 50 br/min   Press High Alarm 60 cmH2O   Apnea Rate 24   Apnea Volume (mL) 0 mL   Apnea Oxygen Concentration  100   Apnea Flow Rate (L/min) 60   T Apnea 25 sec(s)   Ready to  Wean/Extubation Screen   FIO2<=50 (chart decimal) 0.5   MV<16L (chart vol.) 13.4   PEEP <=8 (chart #) 8   Ready to Wean Parameters   F/VT Ratio<105 (RSBI) (!) 91.16   Respiratory Evaluation   $ Care Plan Tech Time 15 min

## 2023-07-30 NOTE — PROGRESS NOTES
07/30/2023      Admit Date: 7/22/2023  Genna Felix  Pulmonary and Critical Care Inpatient Progress Note    Chief Complaint   Patient presents with    Shortness of Breath     Arrived from [place] via [facility] with c/c SOB that started earlier today       History of Present Illness:  Pt is a 61 yo female with hepatic steatosis, PE, GERD, anoxic brain injury dependent on trach/vent and PEG who presented to the ED with desaturation. She was found to have pneumonia and UTI. Pulmonary is consulted for vent management. She is hypothermic requiring warming blanket today. Hx is gathered from chart review. There is no family at bedside.    7/24/23: Minimal urine output. Unable to place Hamilton. Obtaining bladder scan -- unable to quantify, but full of fluid. Re-attempting Hamilton.    7/25/23: Stable.    7/26/23: Respiratory acidosis this morning and patient noted to have some increased respiratory effort. There was an audible leak around the tracheostomy tube, which resolved when I more fully inflated the cuff.    7/27/23: Stable this morning. ABG with mild metabolic alkalosis, likely due to increased bicarbonate which was compensatory for her recent hypercapnia, but she is now tachypneic with a normal PaCO2.    7/28/23: Developed SVT yesterday afternoon with subsequent acute worsening of hypoxia, which improved when heart rate improved.    7/29/23: Hypoxic again yesterday afternoon, but less so this morning. CTA last night without PE but with findings of PNA and possibly pulmonary edema.    7/30/23: Stable, waiting for placement.      PFSH:  Past Medical History:   Diagnosis Date    Anoxic brain damage 07/2020    Bedbound 07/2020    Cardiac arrest 07/2020    Cirrhosis     GERD (gastroesophageal reflux disease)     Hemangioma of intra-abdominal structure     Hypertension     Nursing home resident     Protein calorie malnutrition     Pulmonary embolus     Respiratory distress     S/P percutaneous endoscopic gastrostomy (PEG)  tube placement 07/2020    Total self-care deficit 07/2020    Tracheostomy dependence     7/2020     Past Surgical History:   Procedure Laterality Date    PEG W/TRACHEOSTOMY PLACEMENT  07/27/2020    SKIN GRAFT      buttocks     Social History     Tobacco Use    Smoking status: Never    Smokeless tobacco: Never   Substance Use Topics    Alcohol use: Not Currently    Drug use: Not Currently     Family History   Problem Relation Age of Onset    Hypertension Mother     No Known Problems Father      Review of patient's allergies indicates:  No Known Allergies    Performance Status:Performance Status:The patient's activity level is bedbound.    Review of Systems:  due to neurologic status/impairments a     Exam:Comprehensive exam done. BP (!) 100/57   Pulse 80   Temp 98.2 °F (36.8 °C)   Resp (!) 24   Ht 5' (1.524 m)   Wt 92.7 kg (204 lb 5.9 oz)   SpO2 (!) 92%   Breastfeeding No   BMI 41.85 kg/m²       PHYSICAL EXAM  GENERAL:  NAD.  HEENT: EOMI. PERRLA. Opens eyes spontaneously, but no response to confrontation.  NECK: No cervical adenopathy palpated. No JVD or HJR.  HEART: Regular rate and rhythm. No murmur or gallop auscultated.  LUNGS: No crackles or wheezing on anterior auscultation. Audible cuff leak.  ABDOMEN: Bowel sounds present. Soft, non-tender, non-distended.  EXTREMITIES: Normal muscle tone and joint movement, no cyanosis or clubbing.   LYMPHATICS: No adenopathy palpated, no edema.  SKIN: Dry, intact, no lesions.   NEURO: Unresponsive.  PSYCH: Unable to assess.    Imaging Results              CTA Chest Abdomen Pelvis (Final result)  Result time 07/22/23 22:24:45      Final result by Danny Rasmussen DO (07/22/23 22:24:45)                   Narrative:    PROCEDURE:  CT Angiography Chest, Abdomen and Pelvis With Intravenous Contrast    CLINICAL INDICATION:  The patient is 62 years old and is Female; respiratory distress    TECHNIQUE:  Axial computed tomographic angiography images of the chest, abdomen  and pelvis with intravenous contrast.  This CT exam was performed using one or more of the following dose reduction techniques:  automated exposure control, adjustment of the mA and/or kV according to patient size, and/or use of iterative reconstruction technique.  MIP reconstructed images were created and reviewed.    DLP: 955 mGycm    CONTRAST:  100mL of Omnipaque 350 was administered intravenously.    COMPARISON:  Chest radiograph of the same day and CT abdomen and pelvis dated 7/6/2023.    FINDINGS:    VASCULATURE:  AORTA:  No acute findings.  No aortic aneurysm.  No dissection.  PULMONARY ARTERIES:  Unremarkable as visualized.  No pulmonary embolism is identified.  GREAT VESSELS OF AORTIC ARCH:  No acute findings.  No dissection.  No arterial occlusion or significant stenosis.  CELIAC TRUNK AND MESENTERIC ARTERIES:  No acute findings.  No occlusion or significant stenosis.  RENAL ARTERIES:  No acute findings.  No occlusion or significant stenosis.  ILIAC ARTERIES:  No acute findings.  No occlusion or significant stenosis.    CHEST:  LUNGS:  Bilateral interstitial opacities with interlobular septal thickening and consolidation in the right lower lobe.  PLEURAL SPACE:  Small bilateral pleural effusions.  No pneumothorax.  HEART:  Unremarkable.  No cardiomegaly.  No significant pericardial effusion.  MEDIASTINUM:  Mediastinal and bihilar lymphadenopathy.    ABDOMEN:  LIVER:  Hepatic steatosis.  GALLBLADDER AND BILE DUCTS:  Unremarkable.  No calcified stones.  No ductal dilation.  PANCREAS:  Unremarkable.  No ductal dilation.  No mass.  SPLEEN:  Splenomegaly.  ADRENALS:  Unremarkable.  No mass.  KIDNEYS AND URETERS:  Unremarkable.  No hydronephrosis.  No solid mass.  STOMACH AND BOWEL:  Unremarkable.  No obstruction.  No mucosal thickening.    PELVIS:  APPENDIX:  No findings to suggest acute appendicitis.  BLADDER:  Bladder is decompressed.  REPRODUCTIVE:  Prior hysterectomy.    CHEST, ABDOMEN and  PELVIS:  INTRAPERITONEAL SPACE:  Unremarkable.  No significant fluid collection.  No free air.  BONES/JOINTS:  Severe heterotopic bone formation arising from the left acetabulum. Milder similar-appearing finding on the right. Advanced left shoulder degenerative changes.  Diffuse osteopenia. Multilevel degenerative changes.  No acute fracture.  No dislocation.  SOFT TISSUES:  Fat-containing umbilical hernia.  LYMPH NODES:  Unremarkable.  No enlarged lymph nodes.  TUBES, LINES AND DEVICES:  Partially evaluated gastrostomy and tracheostomy tubes.    IMPRESSION:    1.  No acute aortic abnormality or pulmonary embolism.    2.  Bilateral interstitial opacities with interlobular septal thickening and consolidation in the right lower lobe.  Imaging features can be seen with COVID pneumonia, though are nonspecific and can occur with a variety of infectious and noninfectious processes. PneInd.    3.  Mediastinal and bihilar lymphadenopathy.    4.  Small bilateral pleural effusions.    5.  No acute abdominal or pelvic abnormality.    6.  Splenomegaly.    7.  Hepatic steatosis.    Electronically signed by:  Danny Rasmussen DO  7/22/2023 10:24 PM CDT Workstation: [id number]                                     X-Ray Chest AP Portable (Final result)  Result time 07/22/23 19:49:01      Final result by Jayant Junior MD (07/22/23 19:49:01)                   Narrative:    CLINICAL DATA:  Sepsis and shortness of breath    TECHNIQUE:  Views: A single AP view.  Limitations: The images are technically satisfactory.    COMPARISON:  July 8, 2023    FINDINGS:  CARDIOVASCULAR: Normal.    LUNGS AND PLEURA:  1. Infiltrative changes are seen diffusely throughout both lungs. Allowing for technical differences these have increased slightly since last examination.    MEDIASTINAL AND HILAR STRUCTURES: Normal.    OSSEOUS STRUCTURES: Normal.    ADDITIONAL FINDINGS:  1. The tracheostomy tube remains in good position.    IMPRESSION:    1.   Slight increasing infiltrates in the lungs.    This document was created using a voice recognition transcribing system. Incorrect words or phrases may have been missed during proof reading. Please interpret accordingly or contact the radiologist for clarification if necessary.    Electronically signed by:  Jayant Junior MD  7/22/2023 7:49 PM CDT Workstation: [id number]                                        Labs     Recent Labs   Lab 07/30/23 0307 07/30/23 0315   WBC 8.59  --    HGB 8.1*  --    HCT 27.9* 34*     --      Recent Labs   Lab 07/30/23 0307      K 4.1      CO2 29   BUN 27*   CREATININE <0.3*   GLU 92   CALCIUM 9.0   MG 1.6   PHOS 2.9   AST 18   ALT 22   ALKPHOS 120   BILITOT 0.9   PROT 8.2   ALBUMIN 3.0*     Recent Labs   Lab 07/30/23 0315   PH 7.436   PCO2 50.2*   PO2 73*   HCO3 33.8*       Microbiology Results (last 7 days)       Procedure Component Value Units Date/Time    Culture, Respiratory with Gram Stain [[id number]]  (Abnormal)  (Susceptibility) Collected: 07/24/23 1148    Order Status: Completed Specimen: Respiratory from Sputum Updated: 07/29/23 1039     Respiratory Culture PSEUDOMONAS AERUGINOSA   Many  MDRO  Isolate sent out for reference testing       Gram Stain (Respiratory) <10 epithelial cells per low power field.     Gram Stain (Respiratory) Many WBC's     Gram Stain (Respiratory) Moderate Gram negative rods    Culture, ID (Consult) [[id number]]  (Abnormal)  (Susceptibility) Collected: 07/24/23 1148    Order Status: Completed Specimen: Respiratory from Sputum Updated: 07/29/23 0912     Culture, Identification (consult) PSEUDOMONAS AERUGINOSA     Blood culture x two cultures. Draw prior to antibiotics. [[id number]] Collected: 07/22/23 2044    Order Status: Completed Specimen: Blood Updated: 07/27/23 2232     Blood Culture, Routine No growth after 5 days.    Narrative:      Aerobic and anaerobic  Collection has been rescheduled by 2MB at 07/22/2023 19:43  Reason: Amairani liz said she will call when she wants cultures drawn  Collection has been rescheduled by 2MB at 07/22/2023 19:43 Reason: Amairani liz said she will call when she wants cultures drawn    Blood culture x two cultures. Draw prior to antibiotics. [[id number]] Collected: 07/22/23 2050    Order Status: Completed Specimen: Blood Updated: 07/27/23 2232     Blood Culture, Routine No growth after 5 days.    Narrative:      Aerobic and anaerobic  Collection has been rescheduled by 2MB at 07/22/2023 19:43 Reason: Amairani liz said she will call when she wants cultures drawn  Collection has been rescheduled by 2MB at 07/22/2023 19:43 Reason: Amairani liz said she will call when she wants cultures drawn    Urine culture [[id number]] Collected: 07/23/23 1602    Order Status: Completed Specimen: Urine Updated: 07/26/23 0703     Urine Culture, Routine No growth    Narrative:      Specimen Source->Urine    MRSA Screen by PCR [[id number]] Collected: 07/23/23 1610    Order Status: Completed Specimen: Nasopharyngeal Swab from Nasal Updated: 07/23/23 1750     MRSA SCREEN BY PCR Negative          CXR 7/25/23: Dense opacifications along the lateral periphery of the left lung, which could represent a large pneumonia; there are also diffuse bilateral alveolar opacities otherwise. This is similar to 7/21/23, but the left opacities are more prominent.    CXR 7/26/23: Similar to prior with diffuse, hazy, bilateral opacities, R>L.    CXR 7/28/23: Stable bilateral hazy, patchy alveolar opacities, R>L.    CTA chest 7/28/23:  no PE, bilateral dense and groundglass patchy opacities with septal thickening more prominent on right and posteriorly, likely representing multifocal pneumonia with a possible element of pulmonary edema. There are small bilateral pleural effusions.    Impression and Plan:  Active Hospital Problems    Diagnosis  POA    *Acute on chronic respiratory failure [J96.20]  Yes    Paroxysmal SVT (supraventricular  tachycardia) [I47.1]  Yes    Tracheostomy malfunction [J95.03]  Yes    Obesity (BMI 30-39.9) [E66.9]  Yes    Hepatic steatosis [K76.0]  Yes    Pressure injury of buttock, stage 2 [L89.302]  Yes    Pneumonia of both lungs due to infectious organism [J18.9]  Yes    Essential hypertension [I10]  Yes    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]  Not Applicable    PEG (percutaneous endoscopic gastrostomy) status [Z93.1]  Not Applicable    Dislocation of right shoulder joint [S43.004A]  Yes    Anemia of chronic disease [D63.8]  Yes    Persistent vegetative state [R40.3]  Yes      Resolved Hospital Problems   No resolved problems to display.     #Acute on chronic hypoxemic/hypercapneic resp failure  #Vegetative state with trach/vent and PEG-dependence  #Compensated hypercapnia  #Mild metabolic alkalosis  #Normocytic anemia  - continue vent  - ABG qAM  - continue airway clearance regimen  - continue feeds via PEG    #Ventilator-associated pneumonia due to /MDRO P. aeruginosa  #UTI, complicated  - antibiotics as per ID    #Leak around tracheostomy tube, positional  Trach tube exchanged 7/27 to [facility] 8 XLT, but this did not fix the problem.  - position as needed to minimize air leak  - inflate cuff as needed within safe parameters    #Hypoxemia, labile  Likely due to pneumonia and exacerbated by SVT. Bedside echo 7/27 showed small, collapsible IVC, so fluid overload less likely.  - wean O2 as tolerated for SpO2 90% or greater    #Decreased lung and/or chest wall compliance  Peak and plateau pressures have been elevated (Ppeak 35-50s) on most measurements during this admission. I suspect this is from decreased chest wall compliance from contractures and obesity.  - no acute intervention    #SVT, resolved  - metoprolol    Upon my evaluation, this patient had a high probability of imminent or life-threatening deterioration, which required my direct attention, intervention, and personal management.    I have personally provided at  least 35 minutes of critical care time exclusive of time spent on separately billable procedures. Over 50% of the time of this encounter was spent in direct care at the bedside. Time includes review of laboratory data, radiology results, discussion with consultants, and monitoring for potential decompensation. Interventions were performed as documented above.    Yonas Ritchie MD  Pulmonary and Critical Care Medicine  Formerly [facility]  Date of Service: 07/30/2023  2:16 PM

## 2023-07-30 NOTE — CARE UPDATE
07/30/23 5717   Patient Assessment/Suction   Level of Consciousness (AVPU) alert   Respiratory Effort Unlabored;Normal   All Lung Fields Breath Sounds rhonchi   Rhythm/Pattern, Respiratory assisted mechanically   Cough Frequency frequent   Cough Type good   Suction Method tracheal   $ Suction Charges Inline Suction Procedure Stat Charge   Secretions Amount small   Secretions Color white   Secretions Characteristics frothy   PRE-TX-O2   Device (Oxygen Therapy) ventilator   Oxygen Concentration (%) 45   SpO2 (!) 88 %   Pulse 85   Vent Select   Charged w/in last 24h YES   Preset Conventional Ventilator Settings   Vent ID 2   Vent Type    Ventilation Type VC   Vent Mode A/C   Set Rate 24 BPM   Vt Set 360 mL   PEEP/CPAP 10 cmH20  (INCREASED FROM 8 FOR DESATS / COUGH/FROTHY SPUTUM)   Pressure Support 0 cmH20   Waveform RAMP   Peak Flow 55 L/min   Plateau Set/Insp. Hold (sec) 0   Trigger Sensitivity Flow/I-Trigger 2 L/min   Patient Ventilator Parameters   Resp Rate Total 26 br/min   Peak Airway Pressure 31 cmH20   Mean Airway Pressure 17 cmH20   Plateau Pressure 0 cmH20   Exhaled Vt 406 mL   Total Ve 10.2 L/m   I:E Ratio Measured 1:2.10   Conventional Ventilator Alarms   Alarms On Y   Resp Rate High Alarm 40 br/min   Press High Alarm 55 cmH2O   Apnea Rate 24   Apnea Volume (mL) 0 mL   Apnea Oxygen Concentration  100   Apnea Flow Rate (L/min) 60   T Apnea 25 sec(s)   IHI Ventilator Associated Pneumonia Bundle (Required)   Head of Bed Elevated  HOB 30   Ready to Wean/Extubation Screen   FIO2<=50 (chart decimal) 0.45   MV<16L (chart vol.) 10.2   PEEP <=8 (chart #) (!) 10   Respiratory Evaluation   $ Care Plan Tech Time 15 min

## 2023-07-30 NOTE — PROGRESS NOTES
"[facility] Medicine  Progress Note    Patient Name: Genna Felix  MRN: [id number]  Patient Class: IP- Inpatient   Admission Date: 7/22/2023  Length of Stay: 8 days  Attending Physician: Kirill Garcia MD  Primary Care Provider: Primary Doctor No        Subjective:     Principal Problem:Acute on chronic respiratory failure        HPI:  Patient presents to ER due to respiratory distress.     Spoke with sister, Amelia. States that she was told her oxygen was dropping low at Ms. Felix's nursing home, [place]. Was transferred to the ER. Called [facility] for more info and spoke with Ms. Yousif (a nurse who got sign out from the day team about patient). Was told that Respiratory was trying to improve her sats throughout the day however it never improved above 87%. Patient ws then transferred to ER. Staff noticed urine output was dirty with sediment so u/a and urine culture ordered.      Overview/Hospital Course:  Genna Felix is a 62 year old female with a past medical history of anoxic brain injury s/p G tube and tracheostomy, PE, GERD, HTN, hepatic steatosis, obesity, and anemia who presented with severe sepsis with acute respiratory failure requiring mechanical ventilation. Respiratory cultures show  Pseudomonas. ID has been consulted and started Avycaz. Pulmonary has also been consulted for ventilator management. Her course has been complicated by tracheostomy cuff leak; Pulmonary replaced the cuff 7/27. She is intermittently hypoxic and it has been difficult weaning the ventilator during her course (the patient was not chronically ventilated on admission). She also developed SVT for which scheduled metoprolol has been ordered. Cardiology was consulted. She also was noted to have a right shoulder dislocation on CTA for which Orthopedic Surgery has been consulted.      Interval History: see "Hospital Course"    Review of Systems   Unable to perform ROS: Patient nonverbal "     Objective:     Vital Signs (Most Recent):  Temp: 97.2 °F (36.2 °C) (07/30/23 0301)  Pulse: 89 (07/30/23 0859)  Resp: (!) 24 (07/30/23 0710)  BP: 119/62 (07/30/23 0859)  SpO2: 96 % (07/30/23 0859) Vital Signs (24h Range):  Temp:  [97.2 °F (36.2 °C)-98.6 °F (37 °C)] 97.2 °F (36.2 °C)  Pulse:  [63-94] 89  Resp:  [18-48] 24  SpO2:  [90 %-100 %] 96 %  BP: ()/(52-73) 119/62     Weight: 92.7 kg (204 lb 5.9 oz)  Body mass index is 41.85 kg/m².    Intake/Output Summary (Last 24 hours) at 7/30/2023 1251  Last data filed at 7/30/2023 0631  Gross per 24 hour   Intake 866.67 ml   Output 1250 ml   Net -383.33 ml         Physical Exam  Vitals and nursing note reviewed.   Constitutional:       General: She is not in acute distress.     Appearance: She is ill-appearing.   HENT:      Head: Normocephalic and atraumatic.      Right Ear: External ear normal.      Left Ear: External ear normal.      Nose: Nose normal.      Mouth/Throat:      Mouth: Mucous membranes are moist.      Pharynx: Oropharynx is clear.   Eyes:      Extraocular Movements: Extraocular movements intact.      Conjunctiva/sclera: Conjunctivae normal.   Neck:      Comments: Tracheostomy.  Cardiovascular:      Rate and Rhythm: Normal rate and regular rhythm.      Pulses: Normal pulses.      Heart sounds: Normal heart sounds.   Pulmonary:      Breath sounds: Normal breath sounds.      Comments: MV.  Abdominal:      General: Bowel sounds are normal.      Palpations: Abdomen is soft.      Tenderness: There is no abdominal tenderness.      Comments: G tube.   Genitourinary:     Comments: Hamilton.  Musculoskeletal:         General: Normal range of motion.   Skin:     General: Skin is warm and dry.   Neurological:      Mental Status: Mental status is at baseline.             Significant Labs: All pertinent labs within the past 24 hours have been reviewed.    Significant Imaging: I have reviewed all pertinent imaging results/findings within the past 24  hours.      Assessment/Plan:      * Acute on chronic respiratory failure  -Continue Avycaz, end date 8/1  -CXR and ABG PRN  -Pulmonary consulted    Tracheostomy malfunction  -Pulmonary managing      Paroxysmal SVT (supraventricular tachycardia)  -Telemetry  -Metoprolol 50 Q6H  -Cardiology consulted      Hepatic steatosis  Chronic. Stable.  -Trend LFTs      Obesity (BMI 30-39.9)  Body mass index is 41.85 kg/m². Morbid obesity complicates all aspects of disease management from diagnostic modalities to treatment.         Pressure injury of buttock, stage 2  -Wound Care consulted      Pneumonia of both lungs due to infectious organism  -ID consulted  -Continue Avycaz; end date 8/1      Difficult Hamilton catheter placement        Essential hypertension  -Continue to monitor      PEG (percutaneous endoscopic gastrostomy) status  -Care per Nursing      Tracheostomy dependence  -Care per RT      Dislocation of right shoulder joint  -Orthopedic Surgery consulted      Persistent vegetative state  -Turn patient q2 hours  -Shower patient daily  -Consult Nutrition for diet recs    Anemia of chronic disease  -Trend Hgb with CBC        VTE Risk Mitigation (From admission, onward)         Ordered     enoxaparin injection 40 mg  Daily         07/23/23 0225     IP VTE HIGH RISK PATIENT  Once         07/23/23 0225     Place sequential compression device  Until discontinued         07/23/23 0225                Discharge Planning   [place]:      Code Status: Full Code   Is the patient medically ready for discharge?:     Reason for patient still in hospital (select all that apply): Patient trending condition, Treatment and Consult recommendations  Discharge Plan A: Return to nursing home                  Kirill Garcia MD  Department of Hospital Medicine   [facility]

## 2023-07-30 NOTE — CARE UPDATE
07/30/23 0710   Patient Assessment/Suction   Level of Consciousness (AVPU) alert   Respiratory Effort Normal;Unlabored   Expansion/Accessory Muscles/Retractions no use of accessory muscles   All Lung Fields Breath Sounds rhonchi   Rhythm/Pattern, Respiratory assisted mechanically   Cough Frequency with stimulation   Cough Type fair   Suction Method tracheal   $ Suction Charges Inline Suction Procedure Stat Charge   Secretions Amount small   Secretions Color white   Secretions Characteristics thick   PRE-TX-O2   Device (Oxygen Therapy) ventilator   $ Is the patient on Low Flow Oxygen? Yes   Oxygen Concentration (%) 50   SpO2 96 %   Pulse Oximetry Type Continuous   $ Pulse Oximetry - Multiple Charge Pulse Oximetry - Multiple   Pulse 86   Resp (!) 24   Positioning HOB elevated 30 degrees   Aerosol Therapy   $ Aerosol Therapy Charges Aerosol Treatment   Daily Review of Necessity (SVN) completed   Respiratory Treatment Status (SVN) given   Adult Surgical Airway 07/27/23 1814 Shiley 8.0 mm DIC cuffed Extra Large Cuffed Distal 8.0 / 85 mm 8.0   Placement Date/Time: 07/27/23 1814   Present Prior to Hospital Arrival?: No  Inserted by: MD  Placed By: ICU physician  Type: Tracheostomy  Brand: Shiley  Airway Device Size and Style: 8.0 mm DIC cuffed  Airway Device Style: Extra Large Cuffed Distal ...   Cuff Pressure   (green line with Anant Cuff device)   Cuff Inflation? Inflated   Ties Assessment Secure;Dry;Intact   Airway Safety   Extra trach at bedside? Yes   Extra trach sizes at bedside? 4;6   Is Obturator Available? Yes   Location of Obturator?    (taped to vent)   Vent Select   Conventional Vent Y   $ Ventilator Subsequent 1   Charged w/in last 24h YES   Preset Conventional Ventilator Settings   Vent ID 2   Vent Type    Ventilation Type VC   Vent Mode A/C   Set Rate 24 BPM   Vt Set 360 mL   PEEP/CPAP 8 cmH20   Pressure Support 0 cmH20   Waveform RAMP   Peak Flow 50 L/min   Plateau Set/Insp. Hold (sec) 0   Trigger  Sensitivity Flow/I-Trigger 2 L/min   Patient Ventilator Parameters   Resp Rate Total 28 br/min   Peak Airway Pressure 39 cmH20   Mean Airway Pressure 19 cmH20   Plateau Pressure 0 cmH20   Exhaled Vt 283 mL   Total Ve 11.5 L/m   I:E Ratio Measured 1:1.30   Tubing ID (mm) 8 mm   Tube Type Trach   Conventional Ventilator Alarms   Alarms On Y   Ve High Alarm 23 L/min   Ve Low Alarm 3 L/min   Vt High Alarm 1200 mL   Vt Low Alarm 200 mL   Resp Rate High Alarm 40 br/min   Press High Alarm 50 cmH2O   Apnea Rate 24   Apnea Volume (mL) 0 mL   Apnea Oxygen Concentration  100   Apnea Flow Rate (L/min) 60   T Apnea 25 sec(s)   IHI Ventilator Associated Pneumonia Bundle (Required)   Head of Bed Elevated  HOB 30   Ready to Wean/Extubation Screen   FIO2<=50 (chart decimal) 0.5   MV<16L (chart vol.) 11.5   PEEP <=8 (chart #) 8   Education   $ Education Suction;15 min   Respiratory Evaluation   $ Care Plan Tech Time 15 min

## 2023-07-30 NOTE — SUBJECTIVE & OBJECTIVE
"Interval History: see "Hospital Course"    Review of Systems   Unable to perform ROS: Patient nonverbal     Objective:     Vital Signs (Most Recent):  Temp: 97.2 °F (36.2 °C) (07/30/23 0301)  Pulse: 89 (07/30/23 0859)  Resp: (!) 24 (07/30/23 0710)  BP: 119/62 (07/30/23 0859)  SpO2: 96 % (07/30/23 0859) Vital Signs (24h Range):  Temp:  [97.2 °F (36.2 °C)-98.6 °F (37 °C)] 97.2 °F (36.2 °C)  Pulse:  [63-94] 89  Resp:  [18-48] 24  SpO2:  [90 %-100 %] 96 %  BP: ()/(52-73) 119/62     Weight: 92.7 kg (204 lb 5.9 oz)  Body mass index is 41.85 kg/m².    Intake/Output Summary (Last 24 hours) at 7/30/2023 1251  Last data filed at 7/30/2023 0631  Gross per 24 hour   Intake 866.67 ml   Output 1250 ml   Net -383.33 ml         Physical Exam  Vitals and nursing note reviewed.   Constitutional:       General: She is not in acute distress.     Appearance: She is ill-appearing.   HENT:      Head: Normocephalic and atraumatic.      Right Ear: External ear normal.      Left Ear: External ear normal.      Nose: Nose normal.      Mouth/Throat:      Mouth: Mucous membranes are moist.      Pharynx: Oropharynx is clear.   Eyes:      Extraocular Movements: Extraocular movements intact.      Conjunctiva/sclera: Conjunctivae normal.   Neck:      Comments: Tracheostomy.  Cardiovascular:      Rate and Rhythm: Normal rate and regular rhythm.      Pulses: Normal pulses.      Heart sounds: Normal heart sounds.   Pulmonary:      Breath sounds: Normal breath sounds.      Comments: MV.  Abdominal:      General: Bowel sounds are normal.      Palpations: Abdomen is soft.      Tenderness: There is no abdominal tenderness.      Comments: G tube.   Genitourinary:     Comments: Hamilton.  Musculoskeletal:         General: Normal range of motion.   Skin:     General: Skin is warm and dry.   Neurological:      Mental Status: Mental status is at baseline.             Significant Labs: All pertinent labs within the past 24 hours have been " reviewed.    Significant Imaging: I have reviewed all pertinent imaging results/findings within the past 24 hours.

## 2023-07-30 NOTE — ASSESSMENT & PLAN NOTE
Body mass index is 41.85 kg/m². Morbid obesity complicates all aspects of disease management from diagnostic modalities to treatment.

## 2023-07-31 LAB
ALBUMIN SERPL BCP-MCNC: 2.9 G/DL (ref 3.5–5.2)
ALP SERPL-CCNC: 125 U/L (ref 55–135)
ALT SERPL W/O P-5'-P-CCNC: 19 U/L (ref 10–44)
ANION GAP SERPL CALC-SCNC: 7 MMOL/L (ref 8–16)
AST SERPL-CCNC: 14 U/L (ref 10–40)
BASOPHILS # BLD AUTO: 0.01 K/UL (ref 0–0.2)
BASOPHILS NFR BLD: 0.1 % (ref 0–1.9)
BILIRUB SERPL-MCNC: 0.9 MG/DL (ref 0.1–1)
BUN SERPL-MCNC: 17 MG/DL (ref 6–20)
CALCIUM SERPL-MCNC: 8.9 MG/DL (ref 8.7–10.5)
CHLORIDE SERPL-SCNC: 101 MMOL/L (ref 95–110)
CO2 SERPL-SCNC: 30 MMOL/L (ref 23–29)
CREAT SERPL-MCNC: 0.3 MG/DL (ref 0.5–1.4)
DIFFERENTIAL METHOD: ABNORMAL
EOSINOPHIL # BLD AUTO: 0.4 K/UL (ref 0–0.5)
EOSINOPHIL NFR BLD: 4.3 % (ref 0–8)
ERYTHROCYTE [DISTWIDTH] IN BLOOD BY AUTOMATED COUNT: 21.8 % (ref 11.5–14.5)
EST. GFR  (NO RACE VARIABLE): >60 ML/MIN/1.73 M^2
GLUCOSE SERPL-MCNC: 97 MG/DL (ref 70–110)
HCT VFR BLD AUTO: 27 % (ref 37–48.5)
HGB BLD-MCNC: 7.9 G/DL (ref 12–16)
IMM GRANULOCYTES # BLD AUTO: 0.02 K/UL (ref 0–0.04)
IMM GRANULOCYTES NFR BLD AUTO: 0.2 % (ref 0–0.5)
LYMPHOCYTES # BLD AUTO: 2.1 K/UL (ref 1–4.8)
LYMPHOCYTES NFR BLD: 21.9 % (ref 18–48)
MAGNESIUM SERPL-MCNC: 1.6 MG/DL (ref 1.6–2.6)
MCH RBC QN AUTO: 26.4 PG (ref 27–31)
MCHC RBC AUTO-ENTMCNC: 29.3 G/DL (ref 32–36)
MCV RBC AUTO: 90 FL (ref 82–98)
MONOCYTES # BLD AUTO: 0.8 K/UL (ref 0.3–1)
MONOCYTES NFR BLD: 8 % (ref 4–15)
NEUTROPHILS # BLD AUTO: 6.3 K/UL (ref 1.8–7.7)
NEUTROPHILS NFR BLD: 65.5 % (ref 38–73)
NRBC BLD-RTO: 0 /100 WBC
PHOSPHATE SERPL-MCNC: 4 MG/DL (ref 2.7–4.5)
PLATELET # BLD AUTO: 273 K/UL (ref 150–450)
PMV BLD AUTO: 11.8 FL (ref 9.2–12.9)
POTASSIUM SERPL-SCNC: 3.6 MMOL/L (ref 3.5–5.1)
PROT SERPL-MCNC: 8.3 G/DL (ref 6–8.4)
RBC # BLD AUTO: 2.99 M/UL (ref 4–5.4)
SODIUM SERPL-SCNC: 138 MMOL/L (ref 136–145)
WBC # BLD AUTO: 9.61 K/UL (ref 3.9–12.7)

## 2023-07-31 PROCEDURE — 99291 PR CRITICAL CARE, E/M 30-74 MINUTES: ICD-10-PCS | Mod: ,,, | Performed by: INTERNAL MEDICINE

## 2023-07-31 PROCEDURE — 85025 COMPLETE CBC W/AUTO DIFF WBC: CPT | Performed by: FAMILY MEDICINE

## 2023-07-31 PROCEDURE — 94640 AIRWAY INHALATION TREATMENT: CPT

## 2023-07-31 PROCEDURE — 99900035 HC TECH TIME PER 15 MIN (STAT)

## 2023-07-31 PROCEDURE — 25000003 PHARM REV CODE 250: Performed by: STUDENT IN AN ORGANIZED HEALTH CARE EDUCATION/TRAINING PROGRAM

## 2023-07-31 PROCEDURE — 94761 N-INVAS EAR/PLS OXIMETRY MLT: CPT

## 2023-07-31 PROCEDURE — 84100 ASSAY OF PHOSPHORUS: CPT | Performed by: FAMILY MEDICINE

## 2023-07-31 PROCEDURE — 99233 PR SUBSEQUENT HOSPITAL CARE,LEVL III: ICD-10-PCS | Mod: ,,, | Performed by: STUDENT IN AN ORGANIZED HEALTH CARE EDUCATION/TRAINING PROGRAM

## 2023-07-31 PROCEDURE — 20000000 HC ICU ROOM

## 2023-07-31 PROCEDURE — 36415 COLL VENOUS BLD VENIPUNCTURE: CPT | Performed by: FAMILY MEDICINE

## 2023-07-31 PROCEDURE — 63600175 PHARM REV CODE 636 W HCPCS: Performed by: STUDENT IN AN ORGANIZED HEALTH CARE EDUCATION/TRAINING PROGRAM

## 2023-07-31 PROCEDURE — 83735 ASSAY OF MAGNESIUM: CPT | Performed by: FAMILY MEDICINE

## 2023-07-31 PROCEDURE — 94003 VENT MGMT INPAT SUBQ DAY: CPT

## 2023-07-31 PROCEDURE — 80053 COMPREHEN METABOLIC PANEL: CPT | Performed by: FAMILY MEDICINE

## 2023-07-31 PROCEDURE — 99900031 HC PATIENT EDUCATION (STAT)

## 2023-07-31 PROCEDURE — 27000221 HC OXYGEN, UP TO 24 HOURS

## 2023-07-31 PROCEDURE — 25000003 PHARM REV CODE 250: Performed by: INTERNAL MEDICINE

## 2023-07-31 PROCEDURE — 63600175 PHARM REV CODE 636 W HCPCS: Mod: JZ,JG | Performed by: STUDENT IN AN ORGANIZED HEALTH CARE EDUCATION/TRAINING PROGRAM

## 2023-07-31 PROCEDURE — 99900026 HC AIRWAY MAINTENANCE (STAT)

## 2023-07-31 PROCEDURE — 25000003 PHARM REV CODE 250: Performed by: FAMILY MEDICINE

## 2023-07-31 PROCEDURE — 25000242 PHARM REV CODE 250 ALT 637 W/ HCPCS: Performed by: INTERNAL MEDICINE

## 2023-07-31 PROCEDURE — 99291 CRITICAL CARE FIRST HOUR: CPT | Mod: ,,, | Performed by: INTERNAL MEDICINE

## 2023-07-31 PROCEDURE — 99233 SBSQ HOSP IP/OBS HIGH 50: CPT | Mod: ,,, | Performed by: STUDENT IN AN ORGANIZED HEALTH CARE EDUCATION/TRAINING PROGRAM

## 2023-07-31 RX ADMIN — LEVALBUTEROL HYDROCHLORIDE 1.25 MG: 1.25 SOLUTION RESPIRATORY (INHALATION) at 08:07

## 2023-07-31 RX ADMIN — MICONAZOLE NITRATE: 20 CREAM TOPICAL at 09:07

## 2023-07-31 RX ADMIN — ENOXAPARIN SODIUM 40 MG: 100 INJECTION SUBCUTANEOUS at 09:07

## 2023-07-31 RX ADMIN — DEXTROSE MONOHYDRATE 2.5 G: 50 INJECTION, SOLUTION INTRAVENOUS at 01:07

## 2023-07-31 RX ADMIN — CARBOXYMETHYLCELLULOSE SODIUM 1 DROP: 5 SOLUTION/ DROPS OPHTHALMIC at 09:07

## 2023-07-31 RX ADMIN — CEFTOLOZANE AND TAZOBACTAM 3000 MG: 1; .5 INJECTION, POWDER, LYOPHILIZED, FOR SOLUTION INTRAVENOUS at 05:07

## 2023-07-31 RX ADMIN — MIDODRINE HYDROCHLORIDE 10 MG: 2.5 TABLET ORAL at 06:07

## 2023-07-31 RX ADMIN — LEVALBUTEROL HYDROCHLORIDE 1.25 MG: 1.25 SOLUTION RESPIRATORY (INHALATION) at 11:07

## 2023-07-31 RX ADMIN — Medication 4000 UNITS: at 09:07

## 2023-07-31 RX ADMIN — MICONAZOLE NITRATE: 20 CREAM TOPICAL at 08:07

## 2023-07-31 RX ADMIN — METOPROLOL TARTRATE 50 MG: 50 TABLET, FILM COATED ORAL at 06:07

## 2023-07-31 RX ADMIN — Medication 800 MG: at 09:07

## 2023-07-31 RX ADMIN — POTASSIUM BICARBONATE 60 MEQ: 782 TABLET, EFFERVESCENT ORAL at 09:07

## 2023-07-31 RX ADMIN — CEFTOLOZANE AND TAZOBACTAM 3000 MG: 1; .5 INJECTION, POWDER, LYOPHILIZED, FOR SOLUTION INTRAVENOUS at 10:07

## 2023-07-31 RX ADMIN — LEVALBUTEROL HYDROCHLORIDE 1.25 MG: 1.25 SOLUTION RESPIRATORY (INHALATION) at 03:07

## 2023-07-31 RX ADMIN — CARBOXYMETHYLCELLULOSE SODIUM 1 DROP: 5 SOLUTION/ DROPS OPHTHALMIC at 08:07

## 2023-07-31 RX ADMIN — CARBOXYMETHYLCELLULOSE SODIUM 1 DROP: 5 SOLUTION/ DROPS OPHTHALMIC at 05:07

## 2023-07-31 RX ADMIN — METOPROLOL TARTRATE 50 MG: 50 TABLET, FILM COATED ORAL at 12:07

## 2023-07-31 RX ADMIN — LEVALBUTEROL HYDROCHLORIDE 1.25 MG: 1.25 SOLUTION RESPIRATORY (INHALATION) at 12:07

## 2023-07-31 RX ADMIN — CARBOXYMETHYLCELLULOSE SODIUM 1 DROP: 5 SOLUTION/ DROPS OPHTHALMIC at 01:07

## 2023-07-31 RX ADMIN — DEXTROSE MONOHYDRATE 2.5 G: 50 INJECTION, SOLUTION INTRAVENOUS at 09:07

## 2023-07-31 NOTE — PROGRESS NOTES
[facility]  Adult Nutrition   Progress Note (Nutrition Support Management)    SUMMARY      Recommendations:   1.  Continue current TF of Glucerna 1.5 at the goal rate of 30 mls/h to provide 1080 kcals, 59 g protein and 546 mls water.  2. Continue FWF's of 30 mls water every 4 hours to clear tube and meet fluid needs.   3. RD will continue to monitor rate/tolerance, weights, labs, etc. and make recommendations prn.          Goals:   1) Nutrition provision to meet 100% of pts energy and protein needs.   2) Pt to tolerate TF at the goal rate.    Dietitian Rounds Brief  F/U Nutrition Note: Pt continues to tolerate TF of Glucerna 1.5 at the goal rate of 30 mls/h. Her LBM was yesterday and will continue to follow prn.    Diet order: NPO      TF: Glucerna 1.5  Rate: 30 mls/h  Calories: 1080  Protein: 54 g  Fluid: 546 mls  Water flushes: 30 mls every 4 hours      % Intake of Estimated Energy Needs: 75 - 100 %  % Meal Intake: NPO    Estimated/Assessed Needs  Weight Used For Calorie Calculations: 89 kg (196 lb 3.4 oz)  Energy Calorie Requirements (kcal): 979-1246 kcals/day (11-14 kcals/kg ABW)  Energy Need Method: Kcal/kg  Protein Requirements: 68-90g/day (1.5-2 g/kg IBW)  Weight Used For Protein Calculations: 45 kg (99 lb 3.3 oz)     Estimated Fluid Requirement Method: RDA Method  RDA Method (mL): 979       Weight History:  Wt Readings from Last 5 Encounters:   07/27/23 92.7 kg (204 lb 5.9 oz)   07/29/23 97.2 kg (214 lb 4.6 oz)   07/06/23 88.6 kg (195 lb 5.2 oz)   06/12/23 77.1 kg (170 lb)   10/20/22 77.1 kg (170 lb)        Reason for Assessment  Reason For Assessment: consult, new tube feeding  Diagnosis: other (see comments) (Pneumonia of both lungs due to infectious organism)  Relevant Medical History: Anemia of chronic disease, Persistent vegetative state, Essential hypertension, Ventilator dependence, Urine finding, Hypoxia  Interdisciplinary Rounds: attended    Medications:Pertinent Medications  Reviewed  Scheduled Meds:   carboxymethylcellulose  1 drop Both Eyes QID    ceftazidime-avibactam (AVYCAZ) IVPB  2.5 g Intravenous Q8H    enoxparin  40 mg Subcutaneous Q12H (prophylaxis, 0900/2100)    ergocalciferol  4,000 Units Per G Tube Daily    levalbuterol  1.25 mg Nebulization Q8H    metoprolol tartrate  50 mg Per G Tube Q6H    miconazole   Topical (Top) BID    midodrine  10 mg Per G Tube TID AC     Continuous Infusions:  PRN Meds:.calcium gluconate IVPB, calcium gluconate IVPB, calcium gluconate IVPB, magnesium oxide, magnesium oxide, magnesium sulfate IVPB, magnesium sulfate IVPB, ondansetron, polyethylene glycol, potassium bicarbonate, potassium bicarbonate, potassium bicarbonate, potassium chloride **AND** potassium chloride **AND** potassium chloride, potassium, sodium phosphates, potassium, sodium phosphates, potassium, sodium phosphates, sodium chloride 0.9%, sodium phosphate 15 mmol in dextrose 5 % (D5W) 250 mL IVPB, sodium phosphate 20.01 mmol in dextrose 5 % (D5W) 250 mL IVPB, sodium phosphate 30 mmol in dextrose 5 % (D5W) 250 mL IVPB    Labs: Pertinent Labs Reviewed  Clinical Chemistry:  Recent Labs   Lab 07/29/23  0404 07/30/23  0307 07/31/23  0340    138 138   K 3.9 4.1 3.6    102 101   CO2 31* 29 30*    92 97   BUN 17 27* 17   CREATININE <0.3* <0.3* 0.3*   CALCIUM 8.8 9.0 8.9   PROT 7.8 8.2 8.3   ALBUMIN 2.9* 3.0* 2.9*   BILITOT 1.3* 0.9 0.9   ALKPHOS 134 120 125   AST 16 18 14   ALT 24 22 19   ANIONGAP 7* 7* 7*   MG 1.9 1.6 1.6   PHOS 3.0 2.9 4.0     CBC:   Recent Labs   Lab 07/31/23  0340   WBC 9.61   RBC 2.99*   HGB 7.9*   HCT 27.0*      MCV 90   MCH 26.4*   MCHC 29.3*     Cardiac Profile:  Recent Labs   Lab 07/29/23  0404   *     Monitor and Evaluation  Food and Nutrient Intake: enteral nutrition intake  Food and Nutrient Adminstration: enteral and parenteral nutrition administration  Knowledge/Beliefs/Attitudes: food and nutrition knowledge/skill, beliefs and  attitudes  Physical Activity and Function: nutrition-related ADLs and IADLs, factors affecting access to physical activity  Anthropometric Measurements: weight, weight change, body mass index  Biochemical Data, Medical Tests and Procedures: lipid profile, inflammatory profile, glucose/endocrine profile, gastrointestinal profile, electrolyte and renal panel  Nutrition-Focused Physical Findings: overall appearance     Nutrition Risk  Level of Risk/Frequency of Follow-up: high     Nutrition Follow-Up  RD Follow-up?: Yes    Estrella Jo RD 07/31/2023 10:37 AM

## 2023-07-31 NOTE — SUBJECTIVE & OBJECTIVE
"Interval History: see "Hospital Course"    Review of Systems   Unable to perform ROS: Patient nonverbal     Objective:     Vital Signs (Most Recent):  Temp: 97.7 °F (36.5 °C) (07/31/23 0701)  Pulse: 67 (07/31/23 1201)  Resp: (!) 28 (07/31/23 1201)  BP: (!) 106/56 (07/31/23 1201)  SpO2: 95 % (07/31/23 1201) Vital Signs (24h Range):  Temp:  [97.7 °F (36.5 °C)-98.2 °F (36.8 °C)] 97.7 °F (36.5 °C)  Pulse:  [62-89] 67  Resp:  [8-37] 28  SpO2:  [85 %-97 %] 95 %  BP: ()/(56-79) 106/56     Weight: 92.7 kg (204 lb 5.9 oz)  Body mass index is 41.85 kg/m².    Intake/Output Summary (Last 24 hours) at 7/31/2023 1234  Last data filed at 7/31/2023 0852  Gross per 24 hour   Intake 1410 ml   Output 700 ml   Net 710 ml         Physical Exam  Vitals and nursing note reviewed.   Constitutional:       General: She is not in acute distress.     Appearance: She is ill-appearing.   HENT:      Head: Normocephalic and atraumatic.      Right Ear: External ear normal.      Left Ear: External ear normal.      Nose: Nose normal.      Mouth/Throat:      Mouth: Mucous membranes are moist.      Pharynx: Oropharynx is clear.   Eyes:      Extraocular Movements: Extraocular movements intact.      Conjunctiva/sclera: Conjunctivae normal.   Neck:      Comments: Tracheostomy.  Cardiovascular:      Rate and Rhythm: Normal rate and regular rhythm.      Pulses: Normal pulses.      Heart sounds: Normal heart sounds.   Pulmonary:      Breath sounds: Normal breath sounds.      Comments: MV.  Abdominal:      General: Bowel sounds are normal.      Palpations: Abdomen is soft.      Tenderness: There is no abdominal tenderness.      Comments: G tube.   Genitourinary:     Comments: Hamilton.  Musculoskeletal:         General: Normal range of motion.   Skin:     General: Skin is warm and dry.   Neurological:      Mental Status: Mental status is at baseline.             Significant Labs: All pertinent labs within the past 24 hours have been " reviewed.    Significant Imaging: I have reviewed all pertinent imaging results/findings within the past 24 hours.

## 2023-07-31 NOTE — PROGRESS NOTES
Progress Note  Infectious Disease    Reason for Consult:  Hypothermia, sepsis    HPI: Genna Felix is a 62 y.o. female resident of [facility], known to ID service from prior visits, w PMHx of MAICO 2020, vegetative state, vent dependent, tracheostomy, PEG, PE, HTN, GERD, anemia, osteopenia, acute cholecystitis status post IR drainage in January '22, right arm infection in August '22, MDRs infections like Pseudomonas , Acinetobacter , MRSA, Proteus mirabilis ESBL etc.    She has multiple hospitalizations.   Most recent hospitalization was in the beginning of July.  She had x2 Proteus mirabilis ESBL in urine, Serratia marcescens in sputum, and blood cultures had coag-negative staph on 07/06/2023.  Patient completed treatment with meropenem.    This time, patient is sent to ED because of decreased oxygenation, pulse ox around 87%.  CTA ruled out PE, but found interstitial opacities septal thickening and consolidation of right lower lobe.    She is hypothermic, which is her usual sign of sepsis/infection, needing a [facility] Hugger.  She is admitted in ICU, intubated through tracheostomy, FiO2 of 70%, receiving vancomycin and meropenem.  Secretions are pale and yellow.  Sputum cultures are not sent yet, I just did.  Procalcitonin normal at 0.13.  ,  which is slightly higher than usual, blood cultures NGTD    7/24 ([place]): Interim reviewed, discussed with Dr Mccormack, patient seen and examined at bedside, remains trach to vent, FiO2 70%, white thick secretions. Micro reviewed, MRSA nasal negative, blood cultures x 2 no growth to date pending final. Respiratory culture to be collected. UA yesterday with pyuria, pending culture.     7/25: Interim reviewed, patient seen and examined at bedside, remain on FiO2 70%. Hemodynamically stable, afebrile, Labs reviewed,  WBC stable, no left shift, H/H 7.8/26.3, plt: 193. Creatinine 0.4, mild transaminits. Vanco levels supratherapeutic, discontinued. Micro reviewed,  respiratory culture with presumptive Pseudomonas, pending final.     7/26: interim reviewed, patient seen and examined at bedside, remains on FiO2 70%, looks in mild respiratory distress. Micro updated, /MDRO Pseudomonas aeruginosa, resistant to Zosyn as well (SOPHIA >16). Tachycardic, afebrile.     7/27: interim reviewed, patient seen and examined at bedside, remains on fiO2 70%, parameters to be adjusted, she looks comfortable today, hemodynamically stable, afebrile. Labs reviewed, stable.     7/28: interim reviewed, patient had SVTs yesterday afternoon, noted to have track leakage, addressed by Pulmonary. Hypothermic earlier this morning, afebrile, FiO2 down to 45%. Thick green secretions from trach noted.     7/31:  Interim reviewed, patient seen examined at bedside.  Remains trach to vent FiO2 50%, secretions are now white thick.  Completing 7 days of Avycaz, sensitivities reviewed MDRO Pseudomonas sensitive only to Zerbaxa, will switch and complete 5 days..  Case management working on placement since she is a new vent and cannot go back to [facility].    Antibiotics (From admission, onward)      Start     Stop Route Frequency Ordered    07/26/23 0930  cefTAZidime-avibactam (AVYCAZ) 2.5 g in dextrose 5 % (D5W) 100 mL         08/01/23 2359 IV Every 8 hours (non-standard times) 07/26/23 0817    07/23/23 0900  mupirocin 2 % ointment         07/28/23 0859 Nasl 2 times daily 07/23/23 0032          Antifungals (From admission, onward)      Start     Stop Route Frequency Ordered    07/23/23 0900  miconazole 2 % cream         -- Top 2 times daily 07/23/23 0227            Review of patient's allergies indicates:  No Known Allergies  Past Medical History:   Diagnosis Date    Anoxic brain damage 07/2020    Bedbound 07/2020    Cardiac arrest 07/2020    Cirrhosis     GERD (gastroesophageal reflux disease)     Hemangioma of intra-abdominal structure     Hypertension     Nursing home resident     Protein calorie  malnutrition     Pulmonary embolus     Respiratory distress     S/P percutaneous endoscopic gastrostomy (PEG) tube placement 07/2020    Total self-care deficit 07/2020    Tracheostomy dependence     7/2020     Past Surgical History:   Procedure Laterality Date    PEG W/TRACHEOSTOMY PLACEMENT  07/27/2020    SKIN GRAFT      buttocks     Social History     Socioeconomic History    Marital status:    Tobacco Use    Smoking status: Never    Smokeless tobacco: Never   Substance and Sexual Activity    Alcohol use: Not Currently    Drug use: Not Currently    Sexual activity: Not Currently     Family History   Problem Relation Age of Onset    Hypertension Mother     No Known Problems Father          EXAM & DIAGNOSTICS REVIEWED:   Vitals:     Temp:  [97.7 °F (36.5 °C)-98.2 °F (36.8 °C)]   Temp: 97.7 °F (36.5 °C) (07/31/23 0701)  Pulse: 69 (07/31/23 0848)  Resp: (!) 26 (07/31/23 0848)  BP: (!) 94/57 (07/31/23 0801)  SpO2: 96 % (07/31/23 0848)    Intake/Output Summary (Last 24 hours) at 7/31/2023 0940  Last data filed at 7/31/2023 0852  Gross per 24 hour   Intake 1410 ml   Output 700 ml   Net 710 ml     Vent Mode: A/C  Oxygen Concentration (%):  [45-50] 50  Resp Rate Total:  [24 br/min-43 br/min] 25 br/min  Vt Set:  [360 mL-365 mL] 365 mL  PEEP/CPAP:  [5 cmH20-10 cmH20] 5 cmH20  Pressure Support:  [0 cmH20] 0 cmH20  Mean Airway Pressure:  [15 jfK09-92 cmH20] 15 cmH20     General:  Frail elderly lady, laying in bed, trach to vent FiO2 50 %   Eyes:  Anicteric, L eye with hyperemia   ENT:  Moist oral mucosa, edentulous   Neck:  Supple  Lungs: Better air entry b/l  Heart:  S1/S2+, regular rhythm   Abd:  Slightly distended, still soft to palpation,+ bowel sounds, PEG in place  :  Hamilton, urine yellow  Musc:  Joints without effusion, swelling, erythema, synovitis; positive for muscle wasting.   Skin:  See pictures.  Positive for dry skin throughout.  No palmar or plantar lesions. No subungual petechiae  Neuro:        Gaze   "towards the right upper side  Psych    Lymphatic:       Extrem: No edema, erythema, phlebitis, cellulitis, warm and well perfused  VAD:  Peripheral IV     Midline L 7/7     Isolation:  Contact isolation/[place] resident  Wound:         Lines/Tubes/Drains:    General Labs reviewed:  Recent Labs   Lab 07/29/23  0404 07/29/23  0550 07/30/23  0307 07/30/23  0315 07/31/23  0340   WBC 7.78  --  8.59  --  9.61   HGB 7.9*  --  8.1*  --  7.9*   HCT 27.1*   < > 27.9* 34* 27.0*     --  223  --  273    < > = values in this interval not displayed.       Recent Labs   Lab 07/29/23  0404 07/30/23  0307 07/31/23  0340    138 138   K 3.9 4.1 3.6    102 101   CO2 31* 29 30*   BUN 17 27* 17   CREATININE <0.3* <0.3* 0.3*   CALCIUM 8.8 9.0 8.9   PROT 7.8 8.2 8.3   BILITOT 1.3* 0.9 0.9   ALKPHOS 134 120 125   ALT 24 22 19   AST 16 18 14     No results for input(s): "CRP" in the last 168 hours.    Estimated Creatinine Clearance: 202.7 mL/min (A) (based on SCr of 0.3 mg/dL (L)).      Micro:  Microbiology Results (last 7 days)       Procedure Component Value Units Date/Time    Culture, Respiratory with Gram Stain [[id number]]  (Abnormal)  (Susceptibility) Collected: 07/24/23 1148    Order Status: Completed Specimen: Respiratory from Sputum Updated: 07/29/23 1039     Respiratory Culture PSEUDOMONAS AERUGINOSA   Many  MDRO  Isolate sent out for reference testing       Gram Stain (Respiratory) <10 epithelial cells per low power field.     Gram Stain (Respiratory) Many WBC's     Gram Stain (Respiratory) Moderate Gram negative rods    Culture, ID (Consult) [[id number]]  (Abnormal)  (Susceptibility) Collected: 07/24/23 1148    Order Status: Completed Specimen: Respiratory from Sputum Updated: 07/29/23 0912     Culture, Identification (consult) PSEUDOMONAS AERUGINOSA     Blood culture x two cultures. Draw prior to antibiotics. [[id number]] Collected: 07/22/23 2044    Order Status: Completed Specimen: Blood Updated: 07/27/23 " 2232     Blood Culture, Routine No growth after 5 days.    Narrative:      Aerobic and anaerobic  Collection has been rescheduled by 2MB at 07/22/2023 19:43 Reason: Amairani liz said she will call when she wants cultures drawn  Collection has been rescheduled by 2MB at 07/22/2023 19:43 Reason: Amairani liz said she will call when she wants cultures drawn    Blood culture x two cultures. Draw prior to antibiotics. [[id number]] Collected: 07/22/23 2050    Order Status: Completed Specimen: Blood Updated: 07/27/23 2232     Blood Culture, Routine No growth after 5 days.    Narrative:      Aerobic and anaerobic  Collection has been rescheduled by 2MB at 07/22/2023 19:43 Reason: Amairani liz said she will call when she wants cultures drawn  Collection has been rescheduled by 2MB at 07/22/2023 19:43 Reason: Amairani liz said she will call when she wants cultures drawn    Urine culture [[id number]] Collected: 07/23/23 1602    Order Status: Completed Specimen: Urine Updated: 07/26/23 0703     Urine Culture, Routine No growth    Narrative:      Specimen Source->Urine             Specimen: Respiratory from Sputum Updated: 07/26/23 0831    Respiratory Culture PSEUDOMONAS AERUGINOSA    Many   MDRO   Isolate sent out for reference testing    Abnormal     Gram Stain (Respiratory) <10 epithelial cells per low power field.    Gram Stain (Respiratory) Many WBC's    Gram Stain (Respiratory) Moderate Gram negative rods   Susceptibility     PSEUDOMONAS AERUGINOSA      CULTURE, RESPIRATORY (Preliminary)     Cefepime 16 mcg/mL Intermediate     Ciprofloxacin >2 mcg/mL Resistant     Gentamicin >8 mcg/mL Resistant     Levofloxacin >4 mcg/mL Resistant     Meropenem >8 mcg/mL Resistant     Piperacillin/Tazo 64 mcg/mL Sensitive     Tobramycin >8 mcg/mL Resistant             Collected: 07/24/23 1148   Order Status: Completed Specimen: Respiratory from Sputum Updated: 07/29/23 0912    Culture, Identification (consult) PSEUDOMONAS  AERUGINOSA  Abnormal    Susceptibility     PSEUDOMONAS AERUGINOSA      CULTURE, ID (CONSULT)     Ceftazidime/Avibactam 12 mcg/mL Resistant     Ceftolozane/Tazobactam 1 mcg/mL Sensitive            Imaging Reviewed:  CXR latest: Improvement of the patchy alveolar opacities in the right lung with stable mild increased interstitial markings suggestive of resolving edema or pneumonia.    Chest x-ray 07/22/2023.      Slight increasing infiltrates in the lungs.    CT 07/22/2023.     1.  No acute aortic abnormality or pulmonary embolism.  2.  Bilateral interstitial opacities with interlobular septal thickening and consolidation in the right lower lobe.  Imaging features can be seen with COVID pneumonia, though are nonspecific and can occur with a variety of infectious and noninfectious processes. PneInd.  3.  Mediastinal and bihilar lymphadenopathy.  4.  Small bilateral pleural effusions.  5.  No acute abdominal or pelvic abnormality.  6.  Splenomegaly.  7.  Hepatic steatosis.      Cardiology:    IMPRESSION & PLAN     Sepsis + hypothermia resolved, secondary to VAP, trach exchanged 7/27, FiO2 50%  Respiratory culture Pseudomonas,  - Zosyn resistant as well, SOPHIA >16 R Avycaz, only S to Zerbaxa    MRSA nasal swab not detected   Urine culture no growth    Blood cultures x 2 no growth      2. Resident of [facility], known to ID service from prior visits, w/ PMHx of MAICO 2020, vegetative state, vent dependent, tracheostomy, PEG, PE, HTN, GERD, anemia, osteopenia, acute cholecystitis status post IR drainage in January '22, right arm infection in August '22, MDRs infections like Pseudomonas , Acinetobacter , MRSA, Proteus mirabilis ESBL etc.Splenomegaly, hepato steatosis      Recommendations:  Will switch to Zerbaxa 3g IV q8h for 5 days for VAP  CXR tomorrow   Monitor O2 sats  Aspiration precautions     D/w ICU nursing, Dr Garcia, Dr Ritchie     Medical Decision Making during this encounter was  [_] Low  Complexity  [_] Moderate Complexity  [ xxx ] High Complexity

## 2023-07-31 NOTE — PLAN OF CARE
TARA sent referral to [facility] Ventilator Unit via [facility]. TARA called [facility] liaison, Melody, to follow up on referral. Melody states that [facility] is willing to accept pt and has been in contact with pt's sister Amelia. Melody was notified by [facility] that they were going to continue to have a ventilator unit and are planning to keep pt long term care at their facility.    TARA contacted She at [facility]. She confirmed pt is reaccepted at [facility] for long term care. [facility] will continue to have a Ventilator Unit.

## 2023-07-31 NOTE — CARE UPDATE
SpO2 decreased to 85% on spontaneous settings. Patient placed back on AC/VC      07/31/23 1136   PRE-TX-O2   Oxygen Concentration (%) 50   SpO2 (!) 85 %   Pulse 74   Resp (!) 29   Vent Select   Charged w/in last 24h YES   Preset Conventional Ventilator Settings   Ventilation Type VC   Vent Mode Spont   Vt Set 0 mL   PEEP/CPAP 10 cmH20   Pressure Support 20 cmH20   Waveform RAMP   Peak Flow 55 L/min   Plateau Set/Insp. Hold (sec) 0   Insp Rise Time  50 %   Trigger Sensitivity Flow/I-Trigger 2 L/min   Patient Ventilator Parameters   Resp Rate Total 30 br/min   Peak Airway Pressure 30 cmH20   Mean Airway Pressure 17 cmH20   Plateau Pressure 0 cmH20   Exhaled Vt 363 mL   Total Ve 11.1 L/m   Spont Ve 11.1 L   I:E Ratio Measured 1:1.70   Conventional Ventilator Alarms   Resp Rate High Alarm 40 br/min   Press High Alarm 55 cmH2O   Apnea Rate 24   Apnea Volume (mL) 0 mL   Apnea Oxygen Concentration  100   Apnea Flow Rate (L/min) 60   T Apnea 25 sec(s)   Ready to Wean/Extubation Screen   FIO2<=50 (chart decimal) 0.5   MV<16L (chart vol.) 11.1   PEEP <=8 (chart #) (!) 10   Ready to Wean Parameters   F/VT Ratio<105 (RSBI) (!) 79.89

## 2023-07-31 NOTE — PROGRESS NOTES
"Critical access hospital Medicine  Progress Note    Patient Name: Genna Felix  MRN: [id number]  Patient Class: IP- Inpatient   Admission Date: 7/22/2023  Length of Stay: 9 days  Attending Physician: Kirill Garcia MD  Primary Care Provider: Primary Doctor No        Subjective:     Principal Problem:Acute on chronic respiratory failure        HPI:  Patient presents to ER due to respiratory distress.     Spoke with sister, Amelia. States that she was told her oxygen was dropping low at Ms. Felix's nursing home, [place]. Was transferred to the ER. Called [facility] for more info and spoke with Ms. Yousif (a nurse who got sign out from the day team about patient). Was told that Respiratory was trying to improve her sats throughout the day however it never improved above 87%. Patient ws then transferred to ER. Staff noticed urine output was dirty with sediment so u/a and urine culture ordered.      Overview/Hospital Course:  Genna Felix is a 62 year old female with a past medical history of anoxic brain injury s/p G tube and tracheostomy, PE, GERD, HTN, hepatic steatosis, obesity, and anemia who presented with severe sepsis with acute respiratory failure requiring mechanical ventilation. Respiratory cultures show  Pseudomonas. ID has been consulted and started Avycaz. Pulmonary has also been consulted for ventilator management. Her course has been complicated by tracheostomy cuff leak; Pulmonary replaced the cuff 7/27. She is intermittently hypoxic and it has been difficult weaning the ventilator during her course (the patient was not chronically ventilated on admission). She also developed SVT for which scheduled metoprolol has been ordered. Cardiology was consulted. She also was noted to have a right shoulder dislocation on CTA for which Orthopedic Surgery has been consulted.      Interval History: see "Hospital Course"    Review of Systems   Unable to perform ROS: Patient nonverbal "     Objective:     Vital Signs (Most Recent):  Temp: 97.7 °F (36.5 °C) (07/31/23 0701)  Pulse: 67 (07/31/23 1201)  Resp: (!) 28 (07/31/23 1201)  BP: (!) 106/56 (07/31/23 1201)  SpO2: 95 % (07/31/23 1201) Vital Signs (24h Range):  Temp:  [97.7 °F (36.5 °C)-98.2 °F (36.8 °C)] 97.7 °F (36.5 °C)  Pulse:  [62-89] 67  Resp:  [8-37] 28  SpO2:  [85 %-97 %] 95 %  BP: ()/(56-79) 106/56     Weight: 92.7 kg (204 lb 5.9 oz)  Body mass index is 41.85 kg/m².    Intake/Output Summary (Last 24 hours) at 7/31/2023 1234  Last data filed at 7/31/2023 0852  Gross per 24 hour   Intake 1410 ml   Output 700 ml   Net 710 ml         Physical Exam  Vitals and nursing note reviewed.   Constitutional:       General: She is not in acute distress.     Appearance: She is ill-appearing.   HENT:      Head: Normocephalic and atraumatic.      Right Ear: External ear normal.      Left Ear: External ear normal.      Nose: Nose normal.      Mouth/Throat:      Mouth: Mucous membranes are moist.      Pharynx: Oropharynx is clear.   Eyes:      Extraocular Movements: Extraocular movements intact.      Conjunctiva/sclera: Conjunctivae normal.   Neck:      Comments: Tracheostomy.  Cardiovascular:      Rate and Rhythm: Normal rate and regular rhythm.      Pulses: Normal pulses.      Heart sounds: Normal heart sounds.   Pulmonary:      Breath sounds: Normal breath sounds.      Comments: MV.  Abdominal:      General: Bowel sounds are normal.      Palpations: Abdomen is soft.      Tenderness: There is no abdominal tenderness.      Comments: G tube.   Genitourinary:     Comments: Hamilton.  Musculoskeletal:         General: Normal range of motion.   Skin:     General: Skin is warm and dry.   Neurological:      Mental Status: Mental status is at baseline.             Significant Labs: All pertinent labs within the past 24 hours have been reviewed.    Significant Imaging: I have reviewed all pertinent imaging results/findings within the past 24  hours.      Assessment/Plan:      * Acute on chronic respiratory failure  -Continue Avycaz, end date 8/1  -CXR and ABG PRN  -Pulmonary consulted    Tracheostomy malfunction  -Pulmonary managing      Paroxysmal SVT (supraventricular tachycardia)  -Telemetry  -Metoprolol 50 Q6H  -Cardiology consulted      Hepatic steatosis  Chronic. Stable.  -Trend LFTs      Obesity (BMI 30-39.9)  Body mass index is 41.85 kg/m². Morbid obesity complicates all aspects of disease management from diagnostic modalities to treatment.         Pressure injury of buttock, stage 2  -Wound Care consulted      Pneumonia of both lungs due to infectious organism  -ID consulted  -Continue Avycaz; end date 8/1      Difficult Hamilton catheter placement        Essential hypertension  -Continue to monitor      PEG (percutaneous endoscopic gastrostomy) status  -Care per Nursing      Tracheostomy dependence  -Care per RT      Dislocation of right shoulder joint  -Orthopedic Surgery consulted      Persistent vegetative state  -Turn patient q2 hours  -Shower patient daily  -Consult Nutrition for diet recs    Anemia of chronic disease  -Trend Hgb with CBC        VTE Risk Mitigation (From admission, onward)         Ordered     enoxaparin injection 40 mg  Every 12 hours         07/30/23 1452     IP VTE HIGH RISK PATIENT  Once         07/23/23 0225     Place sequential compression device  Until discontinued         07/23/23 0225                Discharge Planning   [place]:     Code Status: Full Code   Is the patient medically ready for discharge?:     Reason for patient still in hospital (select all that apply): Patient trending condition, Treatment, Consult recommendations and Pending disposition  Discharge Plan A: New Nursing Home placement - [facility] care facility                  Kirill Garcia MD  Department of Hospital Medicine   [facility]

## 2023-07-31 NOTE — RESPIRATORY THERAPY
07/30/23 1905   Patient Assessment/Suction   Level of Consciousness (AVPU) alert   Respiratory Effort Unlabored   Expansion/Accessory Muscles/Retractions no use of accessory muscles   All Lung Fields Breath Sounds coarse;diminished   Rhythm/Pattern, Respiratory assisted mechanically   Cough Frequency no cough   Cough Type assisted   Suction Method oral;tracheal   Suction Pressure (mmHg) -120 mmHg   $ Suction Charges Inline Suction Procedure Stat Charge   Secretions Amount small   Secretions Characteristics thin   Skin Integrity   $ Wound Care Tech Time 15 min   Area Observed Neck;Neck under tracheostomy   Skin Appearance without discoloration   Barrier used?   (drain sponge)   Barrier Changed? Yes   PRE-TX-O2   Device (Oxygen Therapy) ventilator   $ Is the patient on Low Flow Oxygen? Yes   Oxygen Concentration (%) 50   SpO2 (!) 93 %   Pulse Oximetry Type Continuous   $ Pulse Oximetry - Multiple Charge Pulse Oximetry - Multiple   Pulse 84   Resp (!) 0   Adult Surgical Airway 07/27/23 1814 Shiley 8.0 mm DIC cuffed Extra Large Cuffed Distal 8.0 / 85 mm 8.0   Placement Date/Time: 07/27/23 1814   Present Prior to Hospital Arrival?: No  Inserted by: MD  Placed By: ICU physician  Type: Tracheostomy  Brand: Shiley  Airway Device Size and Style: 8.0 mm DIC cuffed  Airway Device Style: Extra Large Cuffed Distal ...   Cuff Inflation? Inflated   Status Secured   Site Assessment Drainage;Oozing secretions   Site Care Cleansed;Dried;Dressing applied   Inner Cannula Care Changed/new   Ties Assessment Clean;Dry;Intact;Secure   Airway Safety   Is Ambu Bag and Mask with Patient? Yes, Adult Ambu Bag and Mask   Suction set is at the bedside? Yes   Extra trach at bedside? Yes   Extra trach sizes at bedside? 4;6   Is Obturator Available? Yes   Location of Obturator?  Bedside table   Respiratory Interventions   Airway/Ventilation Management airway patency maintained   Vent Select   Conventional Vent Y   Charged w/in last 24h YES    Preset Conventional Ventilator Settings   Vent ID 2   Vent Type    Ventilation Type VC   Vent Mode A/C   Humidity HME   Set Rate 24 BPM   Vt Set 365 mL   PEEP/CPAP 10 cmH20   Pressure Support 0 cmH20   Waveform RAMP   Peak Flow 55 L/min   Plateau Set/Insp. Hold (sec) 0   Trigger Sensitivity Flow/I-Trigger 2 L/min   Patient Ventilator Parameters   Resp Rate Total 24 br/min   Peak Airway Pressure 37 cmH20   Mean Airway Pressure 18 cmH20   Plateau Pressure 0 cmH20   Exhaled Vt 394 mL   Total Ve 9.61 L/m   I:E Ratio Measured 1:2.50   Tubing ID (mm) 8 mm   Tube Type Trach   Conventional Ventilator Alarms   Alarms On Y   Resp Rate High Alarm 40 br/min   Press High Alarm 55 cmH2O   Apnea Rate 24   Apnea Volume (mL) 0 mL   Apnea Oxygen Concentration  100   Apnea Flow Rate (L/min) 60   T Apnea 25 sec(s)   Ready to Wean/Extubation Screen   FIO2<=50 (chart decimal) 0.5   MV<16L (chart vol.) 9.61   PEEP <=8 (chart #) (!) 10   Ready to Wean Parameters   F/VT Ratio<105 (RSBI) (!) 0   Education   $ Education Bronchodilator;Suction;Trach Care;Ventilator Oxygen;30 min   Respiratory Evaluation   $ Care Plan Tech Time 15 min   $ Eval/Re-eval Charges Re-evaluation

## 2023-07-31 NOTE — CARE UPDATE
07/31/23 0848   Patient Assessment/Suction   Level of Consciousness (AVPU) responds to pain   Respiratory Effort Unlabored   Expansion/Accessory Muscles/Retractions expansion symmetric   All Lung Fields Breath Sounds coarse   Rhythm/Pattern, Respiratory assisted mechanically   Cough Frequency infrequent;with stimulation   Cough Type assisted;productive   Suction Method oral;tracheal   $ Suction Charges Inline Suction Procedure Stat Charge;Close Suction System Equipment   Secretions Amount large   Secretions Color creamy;white   Skin Integrity   $ Wound Care Tech Time 15 min   Area Observed Neck;Neck under tracheostomy   Skin Appearance without discoloration   PRE-TX-O2   Device (Oxygen Therapy) ventilator   $ Is the patient on Low Flow Oxygen? Yes   Oxygen Concentration (%) 50   SpO2 96 %   Pulse Oximetry Type Continuous   Pulse 69   Resp (!) 26   Aerosol Therapy   $ Aerosol Therapy Charges Aerosol Treatment   Daily Review of Necessity (SVN) completed   Respiratory Treatment Status (SVN) given   Treatment Route (SVN) in-line;ventilator   Patient Position (SVN) HOB elevated   Post Treatment Assessment (SVN) breath sounds unchanged   Signs of Intolerance (SVN) none   Breath Sounds Post-Respiratory Treatment   Throughout All Fields Post-Treatment All Fields   Throughout All Fields Post-Treatment aeration increased   Post-treatment Heart Rate (beats/min) 71   Post-treatment Resp Rate (breaths/min) 33   Adult Surgical Airway 07/27/23 1814 Shiley 8.0 mm DIC cuffed Extra Large Cuffed Distal 8.0 / 85 mm 8.0   Placement Date/Time: 07/27/23 1814   Present Prior to Hospital Arrival?: No  Inserted by: MD  Placed By: ICU physician  Type: Tracheostomy  Brand: Shiley  Airway Device Size and Style: 8.0 mm DIC cuffed  Airway Device Style: Extra Large Cuffed Distal ...   Cuff Inflation? Inflated   Status Secured   Site Assessment Clean;Dry   Site Care Cleansed;Dried;Dressing applied   Inner Cannula Care Changed/new   Ties  Assessment Clean;Dry;Intact;Secure   General Safety Checklist   Safety Promotion/Fall Prevention side rails raised   Airway Safety   Is Ambu Bag and Mask with Patient? Yes, Adult Ambu Bag and Mask   Trach Supplies at Bedside Inner Cannula;Suction Catheter;Normal Saline Bullets;10cc Syringes   Suction set is at the bedside? Yes   Extra trach at bedside? Yes   Extra trach sizes at bedside? 6   Vent Select   Conventional Vent Y   $ Ventilator Subsequent 1   Charged w/in last 24h YES   Preset Conventional Ventilator Settings   Vent ID 2   Vent Type    Ventilation Type VC   Vent Mode A/C   Humidity HME   Set Rate 24 BPM   Vt Set 365 mL   PEEP/CPAP 5 cmH20   Pressure Support 0 cmH20   Waveform RAMP   Peak Flow 55 L/min   Plateau Set/Insp. Hold (sec) 0   Trigger Sensitivity Flow/I-Trigger 2 L/min   Patient Ventilator Parameters   Resp Rate Total 25 br/min   Peak Airway Pressure 37 cmH20   Mean Airway Pressure 15 cmH20   Plateau Pressure 0 cmH20   Exhaled Vt 311 mL   Total Ve 9.56 L/m   I:E Ratio Measured 1:1.70   Conventional Ventilator Alarms   Alarms On Y   Ve High Alarm 23 L/min   Ve Low Alarm 5 L/min   Vt High Alarm 1200 mL   Vt Low Alarm 200 mL   Resp Rate High Alarm 40 br/min   Press High Alarm 55 cmH2O   Apnea Rate 24   Apnea Volume (mL) 0 mL   Apnea Oxygen Concentration  100   Apnea Flow Rate (L/min) 60   T Apnea 25 sec(s)   IHI Ventilator Associated Pneumonia Bundle (Required)   Head of Bed Elevated  HOB 30   Oral Care Mouth swabbed;Mouth suctioned   Ready to Wean/Extubation Screen   FIO2<=50 (chart decimal) 0.5   MV<16L (chart vol.) 9.56   PEEP <=8 (chart #) 5   Ready to Wean Parameters   F/VT Ratio<105 (RSBI) (!) 83.6   Education   $ Education Bronchodilator;Ventilator Oxygen;15 min

## 2023-07-31 NOTE — PROGRESS NOTES
07/31/2023      Admit Date: 7/22/2023  Genna Felix  Pulmonary and Critical Care Inpatient Progress Note    Chief Complaint   Patient presents with    Shortness of Breath     [facility] [place] [facility] that started earlier today       History of Present Illness:  Pt is a 63 yo female with hepatic steatosis, PE, GERD, anoxic brain injury dependent on trach/vent and PEG who presented to the ED with desaturation. She was found to have pneumonia and UTI. Pulmonary is consulted for vent management. She is hypothermic requiring warming blanket today. Hx is gathered from chart review. There is no family at bedside.    7/24/23: Minimal urine output. Unable to place Hamilton. Obtaining bladder scan -- unable to quantify, but full of fluid. Re-attempting Hamilton.    7/25/23: Stable.    7/26/23: Respiratory acidosis this morning and patient noted to have some increased respiratory effort. There was an audible leak around the tracheostomy tube, which resolved when I more fully inflated the cuff.    7/27/23: Stable this morning. ABG with mild metabolic alkalosis, likely due to increased bicarbonate which was compensatory for her recent hypercapnia, but she is now tachypneic with a normal PaCO2.    7/28/23: Developed SVT yesterday afternoon with subsequent acute worsening of hypoxia, which improved when heart rate improved.    7/29/23: Hypoxic again yesterday afternoon, but less so this morning. CTA last night without PE but with findings of PNA and possibly pulmonary edema.    7/30/23: Stable, waiting for placement.    7/31/23: Stable. Trial pressure support today to wean.      PFSH:  Past Medical History:   Diagnosis Date    Anoxic brain damage 07/2020    Bedbound 07/2020    Cardiac arrest 07/2020    Cirrhosis     GERD (gastroesophageal reflux disease)     Hemangioma of intra-abdominal structure     Hypertension     Nursing home resident     Protein calorie malnutrition     Pulmonary embolus     Respiratory  distress     S/P percutaneous endoscopic gastrostomy (PEG) tube placement 07/2020    Total self-care deficit 07/2020    Tracheostomy dependence     7/2020     Past Surgical History:   Procedure Laterality Date    PEG W/TRACHEOSTOMY PLACEMENT  07/27/2020    SKIN GRAFT      buttocks     Social History     Tobacco Use    Smoking status: Never    Smokeless tobacco: Never   Substance Use Topics    Alcohol use: Not Currently    Drug use: Not Currently     Family History   Problem Relation Age of Onset    Hypertension Mother     No Known Problems Father      Review of patient's allergies indicates:  No Known Allergies    Performance Status:Performance Status:The patient's activity level is bedbound.    Review of Systems:  due to neurologic status/impairments a     Exam:Comprehensive exam done. BP (!) 94/57   Pulse 69   Temp 97.7 °F (36.5 °C)   Resp (!) 26   Ht 5' (1.524 m)   Wt 92.7 kg (204 lb 5.9 oz)   SpO2 96%   Breastfeeding No   BMI 41.85 kg/m²       PHYSICAL EXAM  GENERAL:  NAD.  HEENT: EOMI. PERRLA. Opens eyes spontaneously, but no response to confrontation.  NECK: No cervical adenopathy palpated. No JVD or HJR.  HEART: Regular rate and rhythm. No murmur or gallop auscultated.  LUNGS: No crackles or wheezing on anterior auscultation. Audible cuff leak.  ABDOMEN: Bowel sounds present. Soft, non-tender, non-distended.  EXTREMITIES: Normal muscle tone and joint movement, no cyanosis or clubbing.   LYMPHATICS: No adenopathy palpated, no edema.  SKIN: Dry, intact, no lesions.   NEURO: Unresponsive.  PSYCH: Unable to assess.    Imaging Results              CTA Chest Abdomen Pelvis (Final result)  Result time 07/22/23 22:24:45      Final result by Danny Rasmussen DO (07/22/23 22:24:45)                   Narrative:    PROCEDURE:  CT Angiography Chest, Abdomen and Pelvis With Intravenous Contrast    CLINICAL INDICATION:  The patient is 62 years old and is Female; respiratory distress    TECHNIQUE:  Axial computed  tomographic angiography images of the chest, abdomen and pelvis with intravenous contrast.  This CT exam was performed using one or more of the following dose reduction techniques:  automated exposure control, adjustment of the mA and/or kV according to patient size, and/or use of iterative reconstruction technique.  MIP reconstructed images were created and reviewed.    DLP: 955 mGycm    CONTRAST:  100mL of Omnipaque 350 was administered intravenously.    COMPARISON:  Chest radiograph of the same day and CT abdomen and pelvis dated 7/6/2023.    FINDINGS:    VASCULATURE:  AORTA:  No acute findings.  No aortic aneurysm.  No dissection.  PULMONARY ARTERIES:  Unremarkable as visualized.  No pulmonary embolism is identified.  GREAT VESSELS OF AORTIC ARCH:  No acute findings.  No dissection.  No arterial occlusion or significant stenosis.  CELIAC TRUNK AND MESENTERIC ARTERIES:  No acute findings.  No occlusion or significant stenosis.  RENAL ARTERIES:  No acute findings.  No occlusion or significant stenosis.  ILIAC ARTERIES:  No acute findings.  No occlusion or significant stenosis.    CHEST:  LUNGS:  Bilateral interstitial opacities with interlobular septal thickening and consolidation in the right lower lobe.  PLEURAL SPACE:  Small bilateral pleural effusions.  No pneumothorax.  HEART:  Unremarkable.  No cardiomegaly.  No significant pericardial effusion.  MEDIASTINUM:  Mediastinal and bihilar lymphadenopathy.    ABDOMEN:  LIVER:  Hepatic steatosis.  GALLBLADDER AND BILE DUCTS:  Unremarkable.  No calcified stones.  No ductal dilation.  PANCREAS:  Unremarkable.  No ductal dilation.  No mass.  SPLEEN:  Splenomegaly.  ADRENALS:  Unremarkable.  No mass.  KIDNEYS AND URETERS:  Unremarkable.  No hydronephrosis.  No solid mass.  STOMACH AND BOWEL:  Unremarkable.  No obstruction.  No mucosal thickening.    PELVIS:  APPENDIX:  No findings to suggest acute appendicitis.  BLADDER:  Bladder is decompressed.  REPRODUCTIVE:  Prior  hysterectomy.    CHEST, ABDOMEN and PELVIS:  INTRAPERITONEAL SPACE:  Unremarkable.  No significant fluid collection.  No free air.  BONES/JOINTS:  Severe heterotopic bone formation arising from the left acetabulum. Milder similar-appearing finding on the right. Advanced left shoulder degenerative changes.  Diffuse osteopenia. Multilevel degenerative changes.  No acute fracture.  No dislocation.  SOFT TISSUES:  Fat-containing umbilical hernia.  LYMPH NODES:  Unremarkable.  No enlarged lymph nodes.  TUBES, LINES AND DEVICES:  Partially evaluated gastrostomy and tracheostomy tubes.    IMPRESSION:    1.  No acute aortic abnormality or pulmonary embolism.    2.  Bilateral interstitial opacities with interlobular septal thickening and consolidation in the right lower lobe.  Imaging features can be seen with COVID pneumonia, though are nonspecific and can occur with a variety of infectious and noninfectious processes. PneInd.    3.  Mediastinal and bihilar lymphadenopathy.    4.  Small bilateral pleural effusions.    5.  No acute abdominal or pelvic abnormality.    6.  Splenomegaly.    7.  Hepatic steatosis.    Electronically signed by:  Danny Rasmussen DO  7/22/2023 10:24 PM CDT Workstation: [id number]                                     X-Ray Chest AP Portable (Final result)  Result time 07/22/23 19:49:01      Final result by Jayant Junior MD (07/22/23 19:49:01)                   Narrative:    CLINICAL DATA:  Sepsis and shortness of breath    TECHNIQUE:  Views: A single AP view.  Limitations: The images are technically satisfactory.    COMPARISON:  July 8, 2023    FINDINGS:  CARDIOVASCULAR: Normal.    LUNGS AND PLEURA:  1. Infiltrative changes are seen diffusely throughout both lungs. Allowing for technical differences these have increased slightly since last examination.    MEDIASTINAL AND HILAR STRUCTURES: Normal.    OSSEOUS STRUCTURES: Normal.    ADDITIONAL FINDINGS:  1. The tracheostomy tube remains in good  "position.    IMPRESSION:    1.  Slight increasing infiltrates in the lungs.    This document was created using a voice recognition transcribing system. Incorrect words or phrases may have been missed during proof reading. Please interpret accordingly or contact the radiologist for clarification if necessary.    Electronically signed by:  Jayant Junior MD  7/22/2023 7:49 PM CDT Workstation: [id number]                                        Labs     Recent Labs   Lab 07/31/23  0340   WBC 9.61   HGB 7.9*   HCT 27.0*        Recent Labs   Lab 07/31/23  0340      K 3.6      CO2 30*   BUN 17   CREATININE 0.3*   GLU 97   CALCIUM 8.9   MG 1.6   PHOS 4.0   AST 14   ALT 19   ALKPHOS 125   BILITOT 0.9   PROT 8.3   ALBUMIN 2.9*     No results for input(s): "PH", "PCO2", "PO2", "HCO3" in the last 24 hours.      Microbiology Results (last 7 days)       Procedure Component Value Units Date/Time    Culture, Respiratory with Gram Stain [[id number]]  (Abnormal)  (Susceptibility) Collected: 07/24/23 1148    Order Status: Completed Specimen: Respiratory from Sputum Updated: 07/29/23 1039     Respiratory Culture PSEUDOMONAS AERUGINOSA   Many  MDRO  Isolate sent out for reference testing       Gram Stain (Respiratory) <10 epithelial cells per low power field.     Gram Stain (Respiratory) Many WBC's     Gram Stain (Respiratory) Moderate Gram negative rods    Culture, ID (Consult) [[id number]]  (Abnormal)  (Susceptibility) Collected: 07/24/23 1148    Order Status: Completed Specimen: Respiratory from Sputum Updated: 07/29/23 0912     Culture, Identification (consult) PSEUDOMONAS AERUGINOSA     Blood culture x two cultures. Draw prior to antibiotics. [[id number]] Collected: 07/22/23 2044    Order Status: Completed Specimen: Blood Updated: 07/27/23 2232     Blood Culture, Routine No growth after 5 days.    Narrative:      Aerobic and anaerobic  Collection has been rescheduled by 2MB at 07/22/2023 19:43 Reason: Rn "   hamilton liz said she will call when she wants cultures drawn  Collection has been rescheduled by 2MB at 07/22/2023 19:43 Reason: Amairani liz said she will call when she wants cultures drawn    Blood culture x two cultures. Draw prior to antibiotics. [[id number]] Collected: 07/22/23 2050    Order Status: Completed Specimen: Blood Updated: 07/27/23 2232     Blood Culture, Routine No growth after 5 days.    Narrative:      Aerobic and anaerobic  Collection has been rescheduled by 2MB at 07/22/2023 19:43 Reason: Amairani liz said she will call when she wants cultures drawn  Collection has been rescheduled by 2MB at 07/22/2023 19:43 Reason: Amairani liz said she will call when she wants cultures drawn    Urine culture [[id number]] Collected: 07/23/23 1602    Order Status: Completed Specimen: Urine Updated: 07/26/23 0703     Urine Culture, Routine No growth    Narrative:      Specimen Source->Urine          CXR 7/25/23: Dense opacifications along the lateral periphery of the left lung, which could represent a large pneumonia; there are also diffuse bilateral alveolar opacities otherwise. This is similar to 7/21/23, but the left opacities are more prominent.    CXR 7/26/23: Similar to prior with diffuse, hazy, bilateral opacities, R>L.    CXR 7/28/23: Stable bilateral hazy, patchy alveolar opacities, R>L.    CTA chest 7/28/23:  no PE, bilateral dense and groundglass patchy opacities with septal thickening more prominent on right and posteriorly, likely representing multifocal pneumonia with a possible element of pulmonary edema. There are small bilateral pleural effusions.    Impression and Plan:  Active Hospital Problems    Diagnosis  POA    *Acute on chronic respiratory failure [J96.20]  Yes    Paroxysmal SVT (supraventricular tachycardia) [I47.1]  Yes    Tracheostomy malfunction [J95.03]  Yes    Obesity (BMI 30-39.9) [E66.9]  Yes    Hepatic steatosis [K76.0]  Yes    Pressure injury of buttock, stage 2 [L89.302]   Yes    Pneumonia of both lungs due to infectious organism [J18.9]  Yes    Essential hypertension [I10]  Yes    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]  Not Applicable    PEG (percutaneous endoscopic gastrostomy) status [Z93.1]  Not Applicable    Dislocation of right shoulder joint [S43.004A]  Yes    Anemia of chronic disease [D63.8]  Yes    Persistent vegetative state [R40.3]  Yes      Resolved Hospital Problems   No resolved problems to display.     #Acute on chronic hypoxemic/hypercapneic resp failure  #Vegetative state with trach/vent and PEG-dependence  #Compensated hypercapnia  #Mild metabolic alkalosis  #Normocytic anemia  - continue vent   - trial pressure support today, currently requiring PS 20/10 to maintain adequate Vt and RR in high 30s (about what she has been doing on AC/VC)   - may switch back to AC/VC if she does not tolerate this  - ABG qAM  - continue airway clearance regimen  - continue feeds via PEG    #Ventilator-associated pneumonia due to /MDRO P. aeruginosa  #UTI, complicated  - antibiotics as per ID    #Leak around tracheostomy tube, positional  Trach tube exchanged 7/27 to [facility] 8 XLT, but this did not fix the problem.  - position as needed to minimize air leak  - inflate cuff as needed within safe parameters    #Hypoxemia, improving  Likely due to pneumonia and exacerbated by SVT.   - wean O2 as tolerated for SpO2 90% or greater    #Decreased lung and/or chest wall compliance  Peak and plateau pressures have been elevated (Ppeak 35-50s) on most measurements during this admission. I suspect this is from decreased chest wall compliance from contractures and obesity.  - no acute intervention    #SVT, resolved  - metoprolol    Upon my evaluation, this patient had a high probability of imminent or life-threatening deterioration, which required my direct attention, intervention, and personal management.    I have personally provided at least 35 minutes of critical care time exclusive of time spent  on separately billable procedures. Over 50% of the time of this encounter was spent in direct care at the bedside. Time includes review of laboratory data, radiology results, discussion with consultants, and monitoring for potential decompensation. Interventions were performed as documented above.    Yonas Ritchie MD  Pulmonary and Critical Care Medicine  [facility]  Date of Service: 07/31/2023  10:22 AM

## 2023-08-01 VITALS
DIASTOLIC BLOOD PRESSURE: 60 MMHG | SYSTOLIC BLOOD PRESSURE: 129 MMHG | BODY MASS INDEX: 40.13 KG/M2 | HEIGHT: 60 IN | WEIGHT: 204.38 LBS | RESPIRATION RATE: 27 BRPM | TEMPERATURE: 98 F | OXYGEN SATURATION: 93 % | HEART RATE: 62 BPM

## 2023-08-01 PROBLEM — J95.03 TRACHEOSTOMY MALFUNCTION: Status: RESOLVED | Noted: 2023-07-28 | Resolved: 2023-08-01

## 2023-08-01 PROBLEM — Z51.5 PALLIATIVE CARE ENCOUNTER: Status: RESOLVED | Noted: 2022-08-23 | Resolved: 2023-08-01

## 2023-08-01 PROBLEM — J96.10 CHRONIC RESPIRATORY FAILURE: Status: ACTIVE | Noted: 2022-07-29

## 2023-08-01 LAB
ALBUMIN SERPL BCP-MCNC: 3 G/DL (ref 3.5–5.2)
ALLENS TEST: ABNORMAL
ALP SERPL-CCNC: 126 U/L (ref 55–135)
ALT SERPL W/O P-5'-P-CCNC: 19 U/L (ref 10–44)
ANION GAP SERPL CALC-SCNC: 6 MMOL/L (ref 8–16)
AST SERPL-CCNC: 17 U/L (ref 10–40)
BASOPHILS # BLD AUTO: 0.02 K/UL (ref 0–0.2)
BASOPHILS NFR BLD: 0.3 % (ref 0–1.9)
BILIRUB SERPL-MCNC: 0.6 MG/DL (ref 0.1–1)
BUN SERPL-MCNC: 14 MG/DL (ref 6–20)
CALCIUM SERPL-MCNC: 9.1 MG/DL (ref 8.7–10.5)
CHLORIDE SERPL-SCNC: 103 MMOL/L (ref 95–110)
CO2 SERPL-SCNC: 30 MMOL/L (ref 23–29)
CREAT SERPL-MCNC: <0.3 MG/DL (ref 0.5–1.4)
DELSYS: ABNORMAL
DIFFERENTIAL METHOD: ABNORMAL
EOSINOPHIL # BLD AUTO: 0.3 K/UL (ref 0–0.5)
EOSINOPHIL NFR BLD: 4.8 % (ref 0–8)
ERYTHROCYTE [DISTWIDTH] IN BLOOD BY AUTOMATED COUNT: 21.8 % (ref 11.5–14.5)
ERYTHROCYTE [SEDIMENTATION RATE] IN BLOOD BY WESTERGREN METHOD: 24 MM/H
EST. GFR  (NO RACE VARIABLE): >60 ML/MIN/1.73 M^2
FIO2: 50
GLUCOSE SERPL-MCNC: 104 MG/DL (ref 70–110)
GLUCOSE SERPL-MCNC: 110 MG/DL (ref 70–110)
HCO3 UR-SCNC: 30.7 MMOL/L (ref 24–28)
HCT VFR BLD AUTO: 28.4 % (ref 37–48.5)
HCT VFR BLD CALC: 28 %PCV (ref 36–54)
HGB BLD-MCNC: 8.3 G/DL (ref 12–16)
IMM GRANULOCYTES # BLD AUTO: 0.01 K/UL (ref 0–0.04)
IMM GRANULOCYTES NFR BLD AUTO: 0.1 % (ref 0–0.5)
LYMPHOCYTES # BLD AUTO: 1.8 K/UL (ref 1–4.8)
LYMPHOCYTES NFR BLD: 26 % (ref 18–48)
MAGNESIUM SERPL-MCNC: 1.8 MG/DL (ref 1.6–2.6)
MCH RBC QN AUTO: 26.5 PG (ref 27–31)
MCHC RBC AUTO-ENTMCNC: 29.2 G/DL (ref 32–36)
MCV RBC AUTO: 91 FL (ref 82–98)
MODE: ABNORMAL
MONOCYTES # BLD AUTO: 0.6 K/UL (ref 0.3–1)
MONOCYTES NFR BLD: 9.1 % (ref 4–15)
NEUTROPHILS # BLD AUTO: 4.2 K/UL (ref 1.8–7.7)
NEUTROPHILS NFR BLD: 59.7 % (ref 38–73)
NRBC BLD-RTO: 0 /100 WBC
PCO2 BLDA: 43.7 MMHG (ref 35–45)
PEEP: 5
PH SMN: 7.46 [PH] (ref 7.35–7.45)
PHOSPHATE SERPL-MCNC: 3.4 MG/DL (ref 2.7–4.5)
PLATELET # BLD AUTO: 296 K/UL (ref 150–450)
PMV BLD AUTO: 11.7 FL (ref 9.2–12.9)
PO2 BLDA: 73 MMHG (ref 80–100)
POC BE: 7 MMOL/L
POC IONIZED CALCIUM: 1.23 MMOL/L (ref 1.06–1.42)
POC SATURATED O2: 95 % (ref 95–100)
POC TCO2: 32 MMOL/L (ref 23–27)
POTASSIUM BLD-SCNC: 3.9 MMOL/L (ref 3.5–5.1)
POTASSIUM SERPL-SCNC: 3.8 MMOL/L (ref 3.5–5.1)
PROT SERPL-MCNC: 9 G/DL (ref 6–8.4)
RBC # BLD AUTO: 3.13 M/UL (ref 4–5.4)
SAMPLE: ABNORMAL
SITE: ABNORMAL
SODIUM BLD-SCNC: 139 MMOL/L (ref 136–145)
SODIUM SERPL-SCNC: 139 MMOL/L (ref 136–145)
VT: 365
WBC # BLD AUTO: 7.07 K/UL (ref 3.9–12.7)
WBC #/AREA STL HPF: ABNORMAL /[HPF]

## 2023-08-01 PROCEDURE — 36415 COLL VENOUS BLD VENIPUNCTURE: CPT | Performed by: FAMILY MEDICINE

## 2023-08-01 PROCEDURE — 25000003 PHARM REV CODE 250: Performed by: STUDENT IN AN ORGANIZED HEALTH CARE EDUCATION/TRAINING PROGRAM

## 2023-08-01 PROCEDURE — 85014 HEMATOCRIT: CPT

## 2023-08-01 PROCEDURE — 25000003 PHARM REV CODE 250: Performed by: INTERNAL MEDICINE

## 2023-08-01 PROCEDURE — 99233 PR SUBSEQUENT HOSPITAL CARE,LEVL III: ICD-10-PCS | Mod: ,,, | Performed by: STUDENT IN AN ORGANIZED HEALTH CARE EDUCATION/TRAINING PROGRAM

## 2023-08-01 PROCEDURE — 25000242 PHARM REV CODE 250 ALT 637 W/ HCPCS: Performed by: INTERNAL MEDICINE

## 2023-08-01 PROCEDURE — 36600 WITHDRAWAL OF ARTERIAL BLOOD: CPT

## 2023-08-01 PROCEDURE — 82330 ASSAY OF CALCIUM: CPT

## 2023-08-01 PROCEDURE — 83735 ASSAY OF MAGNESIUM: CPT | Performed by: FAMILY MEDICINE

## 2023-08-01 PROCEDURE — 94640 AIRWAY INHALATION TREATMENT: CPT

## 2023-08-01 PROCEDURE — 99233 SBSQ HOSP IP/OBS HIGH 50: CPT | Mod: ,,, | Performed by: STUDENT IN AN ORGANIZED HEALTH CARE EDUCATION/TRAINING PROGRAM

## 2023-08-01 PROCEDURE — 94761 N-INVAS EAR/PLS OXIMETRY MLT: CPT

## 2023-08-01 PROCEDURE — 89055 LEUKOCYTE ASSESSMENT FECAL: CPT | Performed by: INTERNAL MEDICINE

## 2023-08-01 PROCEDURE — 99900031 HC PATIENT EDUCATION (STAT)

## 2023-08-01 PROCEDURE — 99900026 HC AIRWAY MAINTENANCE (STAT)

## 2023-08-01 PROCEDURE — 63600175 PHARM REV CODE 636 W HCPCS: Mod: JZ,JG | Performed by: STUDENT IN AN ORGANIZED HEALTH CARE EDUCATION/TRAINING PROGRAM

## 2023-08-01 PROCEDURE — 99291 PR CRITICAL CARE, E/M 30-74 MINUTES: ICD-10-PCS | Mod: ,,, | Performed by: INTERNAL MEDICINE

## 2023-08-01 PROCEDURE — 84100 ASSAY OF PHOSPHORUS: CPT | Performed by: FAMILY MEDICINE

## 2023-08-01 PROCEDURE — 87046 STOOL CULTR AEROBIC BACT EA: CPT | Performed by: INTERNAL MEDICINE

## 2023-08-01 PROCEDURE — 82803 BLOOD GASES ANY COMBINATION: CPT

## 2023-08-01 PROCEDURE — 84132 ASSAY OF SERUM POTASSIUM: CPT

## 2023-08-01 PROCEDURE — 80053 COMPREHEN METABOLIC PANEL: CPT | Performed by: FAMILY MEDICINE

## 2023-08-01 PROCEDURE — 99291 CRITICAL CARE FIRST HOUR: CPT | Mod: ,,, | Performed by: INTERNAL MEDICINE

## 2023-08-01 PROCEDURE — 84295 ASSAY OF SERUM SODIUM: CPT

## 2023-08-01 PROCEDURE — 63600175 PHARM REV CODE 636 W HCPCS: Performed by: STUDENT IN AN ORGANIZED HEALTH CARE EDUCATION/TRAINING PROGRAM

## 2023-08-01 PROCEDURE — 94003 VENT MGMT INPAT SUBQ DAY: CPT

## 2023-08-01 PROCEDURE — 87045 FECES CULTURE AEROBIC BACT: CPT | Performed by: INTERNAL MEDICINE

## 2023-08-01 PROCEDURE — 85025 COMPLETE CBC W/AUTO DIFF WBC: CPT | Performed by: FAMILY MEDICINE

## 2023-08-01 PROCEDURE — 99900035 HC TECH TIME PER 15 MIN (STAT)

## 2023-08-01 PROCEDURE — 87449 NOS EACH ORGANISM AG IA: CPT | Performed by: INTERNAL MEDICINE

## 2023-08-01 PROCEDURE — 25000003 PHARM REV CODE 250: Performed by: FAMILY MEDICINE

## 2023-08-01 PROCEDURE — 27000221 HC OXYGEN, UP TO 24 HOURS

## 2023-08-01 RX ORDER — LOPERAMIDE HCL 1MG/7.5ML
2 LIQUID (ML) ORAL ONCE AS NEEDED
Status: COMPLETED | OUTPATIENT
Start: 2023-08-01 | End: 2023-08-01

## 2023-08-01 RX ORDER — METOPROLOL TARTRATE 50 MG/1
25 TABLET ORAL 2 TIMES DAILY
Qty: 30 TABLET | Refills: 11
Start: 2023-08-01 | End: 2024-07-31

## 2023-08-01 RX ADMIN — CEFTOLOZANE AND TAZOBACTAM 3000 MG: 1; .5 INJECTION, POWDER, LYOPHILIZED, FOR SOLUTION INTRAVENOUS at 03:08

## 2023-08-01 RX ADMIN — CEFTOLOZANE AND TAZOBACTAM 3000 MG: 1; .5 INJECTION, POWDER, LYOPHILIZED, FOR SOLUTION INTRAVENOUS at 08:08

## 2023-08-01 RX ADMIN — MICONAZOLE NITRATE: 20 CREAM TOPICAL at 08:08

## 2023-08-01 RX ADMIN — MIDODRINE HYDROCHLORIDE 10 MG: 2.5 TABLET ORAL at 06:08

## 2023-08-01 RX ADMIN — LEVALBUTEROL HYDROCHLORIDE 1.25 MG: 1.25 SOLUTION RESPIRATORY (INHALATION) at 07:08

## 2023-08-01 RX ADMIN — CARBOXYMETHYLCELLULOSE SODIUM 1 DROP: 5 SOLUTION/ DROPS OPHTHALMIC at 06:08

## 2023-08-01 RX ADMIN — Medication 4000 UNITS: at 09:08

## 2023-08-01 RX ADMIN — METOPROLOL TARTRATE 50 MG: 50 TABLET, FILM COATED ORAL at 12:08

## 2023-08-01 RX ADMIN — ENOXAPARIN SODIUM 40 MG: 100 INJECTION SUBCUTANEOUS at 09:08

## 2023-08-01 RX ADMIN — CARBOXYMETHYLCELLULOSE SODIUM 1 DROP: 5 SOLUTION/ DROPS OPHTHALMIC at 08:08

## 2023-08-01 RX ADMIN — CARBOXYMETHYLCELLULOSE SODIUM 1 DROP: 5 SOLUTION/ DROPS OPHTHALMIC at 01:08

## 2023-08-01 RX ADMIN — ENOXAPARIN SODIUM 40 MG: 100 INJECTION SUBCUTANEOUS at 08:08

## 2023-08-01 RX ADMIN — LEVALBUTEROL HYDROCHLORIDE 1.25 MG: 1.25 SOLUTION RESPIRATORY (INHALATION) at 03:08

## 2023-08-01 RX ADMIN — METOPROLOL TARTRATE 50 MG: 50 TABLET, FILM COATED ORAL at 06:08

## 2023-08-01 RX ADMIN — Medication 2 MG: at 09:08

## 2023-08-01 NOTE — PLAN OF CARE
Cleared for dc.  Awaiting transportation arranged by Duyen Lucas       08/01/23 8513   Final Note   Anticipated Discharge Disposition Int Care Fac

## 2023-08-01 NOTE — PROGRESS NOTES
08/01/2023      Admit Date: 7/22/2023  Genna Felix  Pulmonary and Critical Care Inpatient Progress Note    Chief Complaint   Patient presents with    Shortness of Breath     Arrived from [place] via [facility] with c/c SOB that started earlier today       History of Present Illness:  Pt is a 63 yo female with hepatic steatosis, PE, GERD, anoxic brain injury dependent on trach/vent and PEG who presented to the ED with desaturation. She was found to have pneumonia and UTI. Pulmonary is consulted for vent management. She is hypothermic requiring warming blanket today. Hx is gathered from chart review. There is no family at bedside.    7/24/23: Minimal urine output. Unable to place Hamilton. Obtaining bladder scan -- unable to quantify, but full of fluid. Re-attempting Hamilton.    7/25/23: Stable.    7/26/23: Respiratory acidosis this morning and patient noted to have some increased respiratory effort. There was an audible leak around the tracheostomy tube, which resolved when I more fully inflated the cuff.    7/27/23: Stable this morning. ABG with mild metabolic alkalosis, likely due to increased bicarbonate which was compensatory for her recent hypercapnia, but she is now tachypneic with a normal PaCO2.    7/28/23: Developed SVT yesterday afternoon with subsequent acute worsening of hypoxia, which improved when heart rate improved.    7/29/23: Hypoxic again yesterday afternoon, but less so this morning. CTA last night without PE but with findings of PNA and possibly pulmonary edema.    7/30/23: Stable, waiting for placement.    7/31/23: Stable. Trial pressure support today to wean.    8/1/23: She was put on AC/VC overnight. She did not tolerate my attempt at pressure support -- needed 25/10 to achieve appropriate Vt. Abx have been adjusted as per sensitivities by ID.      PFSH:  Past Medical History:   Diagnosis Date    Anoxic brain damage 07/2020    Bedbound 07/2020    Cardiac arrest 07/2020    Cirrhosis     GERD  (gastroesophageal reflux disease)     Hemangioma of intra-abdominal structure     Hypertension     Nursing home resident     Protein calorie malnutrition     Pulmonary embolus     Respiratory distress     S/P percutaneous endoscopic gastrostomy (PEG) tube placement 07/2020    Total self-care deficit 07/2020    Tracheostomy dependence     7/2020     Past Surgical History:   Procedure Laterality Date    PEG W/TRACHEOSTOMY PLACEMENT  07/27/2020    SKIN GRAFT      buttocks     Social History     Tobacco Use    Smoking status: Never    Smokeless tobacco: Never   Substance Use Topics    Alcohol use: Not Currently    Drug use: Not Currently     Family History   Problem Relation Age of Onset    Hypertension Mother     No Known Problems Father      Review of patient's allergies indicates:  No Known Allergies    Performance Status:Performance Status:The patient's activity level is bedbound.    Review of Systems:  due to neurologic status/impairments a     Exam:Comprehensive exam done. BP (!) 96/53   Pulse 60   Temp 96.5 °F (35.8 °C)   Resp (!) 31   Ht 5' (1.524 m)   Wt 92.7 kg (204 lb 5.9 oz)   SpO2 (!) 91%   Breastfeeding No   BMI 41.85 kg/m²       PHYSICAL EXAM  GENERAL:  NAD.  HEENT: EOMI. PERRLA. Opens eyes spontaneously, but no response to confrontation.  NECK: No cervical adenopathy palpated. No JVD or HJR.  HEART: Regular rate and rhythm. No murmur or gallop auscultated.  LUNGS: No crackles or wheezing on anterior auscultation. Audible cuff leak.  ABDOMEN: Bowel sounds present. Soft, non-tender, non-distended.  EXTREMITIES: Normal muscle tone and joint movement, no cyanosis or clubbing.   LYMPHATICS: No adenopathy palpated, no edema.  SKIN: Dry, intact, no lesions.   NEURO: Unresponsive.  PSYCH: Unable to assess.    Imaging Results              CTA Chest Abdomen Pelvis (Final result)  Result time 07/22/23 22:24:45      Final result by Danny Rasmussen DO (07/22/23 22:24:45)                   Narrative:     PROCEDURE:  CT Angiography Chest, Abdomen and Pelvis With Intravenous Contrast    CLINICAL INDICATION:  The patient is 62 years old and is Female; respiratory distress    TECHNIQUE:  Axial computed tomographic angiography images of the chest, abdomen and pelvis with intravenous contrast.  This CT exam was performed using one or more of the following dose reduction techniques:  automated exposure control, adjustment of the mA and/or kV according to patient size, and/or use of iterative reconstruction technique.  MIP reconstructed images were created and reviewed.    DLP: 955 mGycm    CONTRAST:  100mL of Omnipaque 350 was administered intravenously.    COMPARISON:  Chest radiograph of the same day and CT abdomen and pelvis dated 7/6/2023.    FINDINGS:    VASCULATURE:  AORTA:  No acute findings.  No aortic aneurysm.  No dissection.  PULMONARY ARTERIES:  Unremarkable as visualized.  No pulmonary embolism is identified.  GREAT VESSELS OF AORTIC ARCH:  No acute findings.  No dissection.  No arterial occlusion or significant stenosis.  CELIAC TRUNK AND MESENTERIC ARTERIES:  No acute findings.  No occlusion or significant stenosis.  RENAL ARTERIES:  No acute findings.  No occlusion or significant stenosis.  ILIAC ARTERIES:  No acute findings.  No occlusion or significant stenosis.    CHEST:  LUNGS:  Bilateral interstitial opacities with interlobular septal thickening and consolidation in the right lower lobe.  PLEURAL SPACE:  Small bilateral pleural effusions.  No pneumothorax.  HEART:  Unremarkable.  No cardiomegaly.  No significant pericardial effusion.  MEDIASTINUM:  Mediastinal and bihilar lymphadenopathy.    ABDOMEN:  LIVER:  Hepatic steatosis.  GALLBLADDER AND BILE DUCTS:  Unremarkable.  No calcified stones.  No ductal dilation.  PANCREAS:  Unremarkable.  No ductal dilation.  No mass.  SPLEEN:  Splenomegaly.  ADRENALS:  Unremarkable.  No mass.  KIDNEYS AND URETERS:  Unremarkable.  No hydronephrosis.  No solid  mass.  STOMACH AND BOWEL:  Unremarkable.  No obstruction.  No mucosal thickening.    PELVIS:  APPENDIX:  No findings to suggest acute appendicitis.  BLADDER:  Bladder is decompressed.  REPRODUCTIVE:  Prior hysterectomy.    CHEST, ABDOMEN and PELVIS:  INTRAPERITONEAL SPACE:  Unremarkable.  No significant fluid collection.  No free air.  BONES/JOINTS:  Severe heterotopic bone formation arising from the left acetabulum. Milder similar-appearing finding on the right. Advanced left shoulder degenerative changes.  Diffuse osteopenia. Multilevel degenerative changes.  No acute fracture.  No dislocation.  SOFT TISSUES:  Fat-containing umbilical hernia.  LYMPH NODES:  Unremarkable.  No enlarged lymph nodes.  TUBES, LINES AND DEVICES:  Partially evaluated gastrostomy and tracheostomy tubes.    IMPRESSION:    1.  No acute aortic abnormality or pulmonary embolism.    2.  Bilateral interstitial opacities with interlobular septal thickening and consolidation in the right lower lobe.  Imaging features can be seen with COVID pneumonia, though are nonspecific and can occur with a variety of infectious and noninfectious processes. PneInd.    3.  Mediastinal and bihilar lymphadenopathy.    4.  Small bilateral pleural effusions.    5.  No acute abdominal or pelvic abnormality.    6.  Splenomegaly.    7.  Hepatic steatosis.    Electronically signed by:  Danny Rasmussen DO  7/22/2023 10:24 PM CDT Workstation: [id number]                                     X-Ray Chest AP Portable (Final result)  Result time 07/22/23 19:49:01      Final result by Jayant Junior MD (07/22/23 19:49:01)                   Narrative:    CLINICAL DATA:  Sepsis and shortness of breath    TECHNIQUE:  Views: A single AP view.  Limitations: The images are technically satisfactory.    COMPARISON:  July 8, 2023    FINDINGS:  CARDIOVASCULAR: Normal.    LUNGS AND PLEURA:  1. Infiltrative changes are seen diffusely throughout both lungs. Allowing for technical  differences these have increased slightly since last examination.    MEDIASTINAL AND HILAR STRUCTURES: Normal.    OSSEOUS STRUCTURES: Normal.    ADDITIONAL FINDINGS:  1. The tracheostomy tube remains in good position.    IMPRESSION:    1.  Slight increasing infiltrates in the lungs.    This document was created using a voice recognition transcribing system. Incorrect words or phrases may have been missed during proof reading. Please interpret accordingly or contact the radiologist for clarification if necessary.    Electronically signed by:  Jayant Junior MD  7/22/2023 7:49 PM CDT Workstation: [id number]                                        Labs     Recent Labs   Lab 08/01/23  0320   WBC 7.07   HGB 8.3*   HCT 28.4*        Recent Labs   Lab 08/01/23  0320      K 3.8      CO2 30*   BUN 14   CREATININE <0.3*      CALCIUM 9.1   MG 1.8   PHOS 3.4   AST 17   ALT 19   ALKPHOS 126   BILITOT 0.6   PROT 9.0*   ALBUMIN 3.0*     Recent Labs   Lab 08/01/23  0316   PH 7.455*   PCO2 43.7   PO2 73*   HCO3 30.7*         Microbiology Results (last 7 days)       Procedure Component Value Units Date/Time    Stool culture [[id number]] Collected: 08/01/23 0629    Order Status: Sent Specimen: Stool Updated: 08/01/23 0740    Clostridium difficile EIA [[id number]] Collected: 08/01/23 0629    Order Status: Canceled Specimen: Stool     Culture, Respiratory with Gram Stain [[id number]]  (Abnormal)  (Susceptibility) Collected: 07/24/23 1148    Order Status: Completed Specimen: Respiratory from Sputum Updated: 07/29/23 1039     Respiratory Culture PSEUDOMONAS AERUGINOSA   Many  MDRO  Isolate sent out for reference testing       Gram Stain (Respiratory) <10 epithelial cells per low power field.     Gram Stain (Respiratory) Many WBC's     Gram Stain (Respiratory) Moderate Gram negative rods    Culture, ID (Consult) [[id number]]  (Abnormal)  (Susceptibility) Collected: 07/24/23 1148    Order Status: Completed  Specimen: Respiratory from Sputum Updated: 07/29/23 0912     Culture, Identification (consult) PSEUDOMONAS AERUGINOSA     Blood culture x two cultures. Draw prior to antibiotics. [[id number]] Collected: 07/22/23 2044    Order Status: Completed Specimen: Blood Updated: 07/27/23 2232     Blood Culture, Routine No growth after 5 days.    Narrative:      Aerobic and anaerobic  Collection has been rescheduled by 2MB at 07/22/2023 19:43 Reason: Amairani liz said she will call when she wants cultures drawn  Collection has been rescheduled by 2MB at 07/22/2023 19:43 Reason: Amairani liz said she will call when she wants cultures drawn    Blood culture x two cultures. Draw prior to antibiotics. [[id number]] Collected: 07/22/23 2050    Order Status: Completed Specimen: Blood Updated: 07/27/23 2232     Blood Culture, Routine No growth after 5 days.    Narrative:      Aerobic and anaerobic  Collection has been rescheduled by 2MB at 07/22/2023 19:43 Reason: Amairani liz said she will call when she wants cultures drawn  Collection has been rescheduled by 2MB at 07/22/2023 19:43 Reason: Amairani liz said she will call when she wants cultures drawn    Urine culture [[id number]] Collected: 07/23/23 1602    Order Status: Completed Specimen: Urine Updated: 07/26/23 0703     Urine Culture, Routine No growth    Narrative:      Specimen Source->Urine          CXR 7/25/23: Dense opacifications along the lateral periphery of the left lung, which could represent a large pneumonia; there are also diffuse bilateral alveolar opacities otherwise. This is similar to 7/21/23, but the left opacities are more prominent.    CXR 7/26/23: Similar to prior with diffuse, hazy, bilateral opacities, R>L.    CXR 7/28/23: Stable bilateral hazy, patchy alveolar opacities, R>L.    CTA chest 7/28/23:  no PE, bilateral dense and groundglass patchy opacities with septal thickening more prominent on right and posteriorly, likely representing multifocal  pneumonia with a possible element of pulmonary edema. There are small bilateral pleural effusions.    Impression and Plan:  Active Hospital Problems    Diagnosis  POA    *Acute on chronic respiratory failure [J96.20]  Yes    Paroxysmal SVT (supraventricular tachycardia) [I47.1]  Yes    Tracheostomy malfunction [J95.03]  Yes    Obesity (BMI 30-39.9) [E66.9]  Yes    Hepatic steatosis [K76.0]  Yes    Pressure injury of buttock, stage 2 [L89.302]  Yes    Pneumonia of both lungs due to infectious organism [J18.9]  Yes    Essential hypertension [I10]  Yes    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]  Not Applicable    PEG (percutaneous endoscopic gastrostomy) status [Z93.1]  Not Applicable    Dislocation of right shoulder joint [S43.004A]  Yes    Anemia of chronic disease [D63.8]  Yes    Persistent vegetative state [R40.3]  Yes      Resolved Hospital Problems   No resolved problems to display.     #Acute on chronic hypoxemic/hypercapneic resp failure  #Vegetative state with trach/vent and PEG-dependence  #Compensated hypercapnia  #Mild metabolic alkalosis  #Normocytic anemia  - continue vent   - trial pressure support as tolerated; AC/VC as needed  - ABG qAM  - continue airway clearance regimen  - continue feeds via PEG    #Ventilator-associated pneumonia due to /MDRO P. aeruginosa  #UTI, complicated  - antibiotics as per ID    #Leak around tracheostomy tube, positional  Trach tube exchanged 7/27 to [facility] 8 XLT, but this did not fix the problem.  - position as needed to minimize air leak  - inflate cuff as needed within safe parameters    #Hypoxemia, improving  Likely due to pneumonia and exacerbated by SVT.   - wean O2 as tolerated for SpO2 90% or greater    #Decreased lung and/or chest wall compliance  Peak and plateau pressures have been elevated (Ppeak 35-50s) on most measurements during this admission. I suspect this is from decreased chest wall compliance from contractures and obesity.  - no acute intervention    #SVT,  resolved  - metoprolol    #Shoulder dislocation  - orthopedics consulted    Upon my evaluation, this patient had a high probability of imminent or life-threatening deterioration, which required my direct attention, intervention, and personal management.    I have personally provided at least 35 minutes of critical care time exclusive of time spent on separately billable procedures. Over 50% of the time of this encounter was spent in direct care at the bedside. Time includes review of laboratory data, radiology results, discussion with consultants, and monitoring for potential decompensation. Interventions were performed as documented above.    Yonas Ritchie MD  Pulmonary and Critical Care Medicine  [facility]  Date of Service: 08/01/2023  10:22 AM

## 2023-08-01 NOTE — ASSESSMENT & PLAN NOTE
-Zerbaxa ordered by ID for five days  -CXR and ABG PRN  -Pulmonary consulted  -Case Management consulted for placement

## 2023-08-01 NOTE — PROGRESS NOTES
[facility] Medicine  Progress Note    Patient Name: Genna Felix  MRN: [id number]  Patient Class: IP- Inpatient   Admission Date: 7/22/2023  Length of Stay: 10 days  Attending Physician: Kirill Garcia MD  Primary Care Provider: Primary Doctor No        Subjective:     Principal Problem:Acute on chronic respiratory failure        HPI:  Patient presents to ER due to respiratory distress.     Spoke with sister, Amelia. States that she was told her oxygen was dropping low at Ms. Felix's nursing home, [place]. Was transferred to the ER. Called [facility] for more info and spoke with Ms. Yousif (a nurse who got sign out from the day team about patient). Was told that Respiratory was trying to improve her sats throughout the day however it never improved above 87%. Patient ws then transferred to ER. Staff noticed urine output was dirty with sediment so u/a and urine culture ordered.      Overview/Hospital Course:  Genna Felix is a 62 year old female with a past medical history of anoxic brain injury s/p G tube and tracheostomy, PE, GERD, HTN, hepatic steatosis, obesity, and anemia who presented with severe sepsis with acute respiratory failure requiring mechanical ventilation. Respiratory cultures show  Pseudomonas. ID has been consulted and started Avycaz. Pulmonary has also been consulted for ventilator management. Her course has been complicated by tracheostomy cuff leak; Pulmonary replaced the cuff 7/27. She is intermittently hypoxic and it has been difficult weaning the ventilator during her course (the patient was not chronically ventilated on admission). She also developed SVT for which scheduled metoprolol has been ordered. Cardiology was consulted. She also was noted to have a right shoulder dislocation on CTA for which Orthopedic Surgery has been consulted. She has been unable to be weaned from mechanical ventilation at this time; Case Management will find placement  "outside of [facility] at this time.      Interval History: see "Hospital Course"    Review of Systems   Unable to perform ROS: Patient nonverbal     Objective:     Vital Signs (Most Recent):  Temp: 96.5 °F (35.8 °C) (08/01/23 0801)  Pulse: 60 (08/01/23 0801)  Resp: (!) 31 (08/01/23 0801)  BP: (!) 96/53 (08/01/23 0801)  SpO2: (!) 91 % (08/01/23 0801) Vital Signs (24h Range):  Temp:  [96.5 °F (35.8 °C)-98.3 °F (36.8 °C)] 96.5 °F (35.8 °C)  Pulse:  [38-82] 60  Resp:  [22-41] 31  SpO2:  [85 %-100 %] 91 %  BP: ()/(50-73) 96/53     Weight: 92.7 kg (204 lb 5.9 oz)  Body mass index is 41.85 kg/m².    Intake/Output Summary (Last 24 hours) at 8/1/2023 1027  Last data filed at 8/1/2023 0701  Gross per 24 hour   Intake 1170 ml   Output 1175 ml   Net -5 ml         Physical Exam  Vitals and nursing note reviewed.   Constitutional:       General: She is not in acute distress.     Appearance: She is ill-appearing.   HENT:      Head: Normocephalic and atraumatic.      Right Ear: External ear normal.      Left Ear: External ear normal.      Nose: Nose normal.      Mouth/Throat:      Mouth: Mucous membranes are moist.      Pharynx: Oropharynx is clear.   Eyes:      Extraocular Movements: Extraocular movements intact.      Conjunctiva/sclera: Conjunctivae normal.   Neck:      Comments: Tracheostomy.  Cardiovascular:      Rate and Rhythm: Normal rate and regular rhythm.      Pulses: Normal pulses.      Heart sounds: Normal heart sounds.   Pulmonary:      Breath sounds: Normal breath sounds.      Comments: MV.  Abdominal:      General: Bowel sounds are normal.      Palpations: Abdomen is soft.      Tenderness: There is no abdominal tenderness.      Comments: G tube.   Genitourinary:     Comments: Hamilton.  Musculoskeletal:         General: Normal range of motion.   Skin:     General: Skin is warm and dry.   Neurological:      Mental Status: Mental status is at baseline.             Significant Labs: All pertinent labs within the " past 24 hours have been reviewed.    Significant Imaging: I have reviewed all pertinent imaging results/findings within the past 24 hours.      Assessment/Plan:      * Acute on chronic respiratory failure  -Zerbaxa ordered by ID for five days  -CXR and ABG PRN  -Pulmonary consulted  -Case Management consulted for placement    Tracheostomy malfunction  -Pulmonary managing      Paroxysmal SVT (supraventricular tachycardia)  -Telemetry  -Metoprolol 50 Q6H  -Cardiology consulted      Hepatic steatosis  Chronic. Stable.  -Trend LFTs      Obesity (BMI 30-39.9)  Body mass index is 41.85 kg/m². Morbid obesity complicates all aspects of disease management from diagnostic modalities to treatment.         Pressure injury of buttock, stage 2  -Wound Care consulted      Pneumonia of both lungs due to infectious organism  -ID consulted  -S/p Avycaz; ID added a five day course of Zerbaxa       Difficult Hamilton catheter placement        Essential hypertension  -Continue to monitor      PEG (percutaneous endoscopic gastrostomy) status  -Care per Nursing      Tracheostomy dependence  -Care per RT      Dislocation of right shoulder joint  -Orthopedic Surgery consulted      Persistent vegetative state  -Turn patient q2 hours  -Shower patient daily  -Consult Nutrition for diet recs    Anemia of chronic disease  -Trend Hgb with CBC        VTE Risk Mitigation (From admission, onward)         Ordered     enoxaparin injection 40 mg  Every 12 hours         07/30/23 1452     IP VTE HIGH RISK PATIENT  Once         07/23/23 0225     Place sequential compression device  Until discontinued         07/23/23 0225                Discharge Planning   [place]:     Code Status: Full Code   Is the patient medically ready for discharge?:     Reason for patient still in hospital (select all that apply): Patient trending condition, Treatment, Consult recommendations and Pending disposition  Discharge Plan A: [facility] Nursing Home placement [facility]                   Kirill Garcia MD  Department of Hospital Medicine   [facility]

## 2023-08-01 NOTE — PROGRESS NOTES
Progress Note  Infectious Disease    Reason for Consult:  Hypothermia, sepsis    HPI: Genna Felix is a 62 y.o. female resident of [facility], known to ID service from prior visits, w PMHx of MAICO 2020, vegetative state, vent dependent, tracheostomy, PEG, PE, HTN, GERD, anemia, osteopenia, acute cholecystitis status post IR drainage in January '22, right arm infection in August '22, MDRs infections like Pseudomonas , Acinetobacter , MRSA, Proteus mirabilis ESBL etc.    She has multiple hospitalizations.   Most recent hospitalization was in the beginning of July.  She had x2 Proteus mirabilis ESBL in urine, Serratia marcescens in sputum, and blood cultures had coag-negative staph on 07/06/2023.  Patient completed treatment with meropenem.    This time, patient is sent to ED because of decreased oxygenation, pulse ox around 87%.  CTA ruled out PE, but found interstitial opacities septal thickening and consolidation of right lower lobe.    She is hypothermic, which is her usual sign of sepsis/infection, needing a Beth Hugger.  She is admitted in ICU, intubated through tracheostomy, FiO2 of 70%, receiving vancomycin and meropenem.  Secretions are pale and yellow.  Sputum cultures are not sent yet, I just did.  Procalcitonin normal at 0.13.  ,  which is slightly higher than usual, blood cultures NGTD    7/24 ([place]): Interim reviewed, discussed with Dr Mccormack, patient seen and examined at bedside, remains trach to vent, FiO2 70%, white thick secretions. Micro reviewed, MRSA nasal negative, blood cultures x 2 no growth to date pending final. Respiratory culture to be collected. UA yesterday with pyuria, pending culture.     7/25: Interim reviewed, patient seen and examined at bedside, remain on FiO2 70%. Hemodynamically stable, afebrile, Labs reviewed,  WBC stable, no left shift, H/H 7.8/26.3, plt: 193. Creatinine 0.4, mild transaminits. Vanco levels supratherapeutic, discontinued. Micro reviewed,  respiratory culture with presumptive Pseudomonas, pending final.     7/26: interim reviewed, patient seen and examined at bedside, remains on FiO2 70%, looks in mild respiratory distress. Micro updated, /MDRO Pseudomonas aeruginosa, resistant to Zosyn as well (SOPHIA >16). Tachycardic, afebrile.     7/27: interim reviewed, patient seen and examined at bedside, remains on fiO2 70%, parameters to be adjusted, she looks comfortable today, hemodynamically stable, afebrile. Labs reviewed, stable.     7/28: interim reviewed, patient had SVTs yesterday afternoon, noted to have track leakage, addressed by Pulmonary. Hypothermic earlier this morning, afebrile, FiO2 down to 45%. Thick green secretions from trach noted.     7/31:  Interim reviewed, patient seen examined at bedside.  Remains trach to vent FiO2 50%, secretions are now white thick.  Completing 7 days of Avycaz, sensitivities reviewed MDRO Pseudomonas sensitive only to Zerbaxa, will switch and complete 5 days..  Case management working on placement since she is a new vent and cannot go back to [facility].    8/1: interim reviewed, patient seen and examined at bedside, FoBT positive. Remains on the vent, Fio2 50%, secretions are white, thin.    Antibiotics (From admission, onward)      Start     Stop Route Frequency Ordered    07/31/23 1530  ceftolozane-tazobactam (ZERBAXA) 3,000 mg in dextrose 5 % (D5W) 100 mL         08/05/23 1529 IV Every 8 hours (non-standard times) 07/31/23 1427    07/23/23 0900  mupirocin 2 % ointment         07/28/23 0859 Nasl 2 times daily 07/23/23 0032          Antifungals (From admission, onward)      Start     Stop Route Frequency Ordered    07/23/23 0900  miconazole 2 % cream         -- Top 2 times daily 07/23/23 0227            Review of patient's allergies indicates:  No Known Allergies  Past Medical History:   Diagnosis Date    Anoxic brain damage 07/2020    Bedbound 07/2020    Cardiac arrest 07/2020    Cirrhosis     GERD  (gastroesophageal reflux disease)     Hemangioma of intra-abdominal structure     Hypertension     Nursing home resident     Protein calorie malnutrition     Pulmonary embolus     Respiratory distress     S/P percutaneous endoscopic gastrostomy (PEG) tube placement 07/2020    Total self-care deficit 07/2020    Tracheostomy dependence     7/2020     Past Surgical History:   Procedure Laterality Date    PEG W/TRACHEOSTOMY PLACEMENT  07/27/2020    SKIN GRAFT      buttocks     Social History     Socioeconomic History    Marital status:    Tobacco Use    Smoking status: Never    Smokeless tobacco: Never   Substance and Sexual Activity    Alcohol use: Not Currently    Drug use: Not Currently    Sexual activity: Not Currently     Family History   Problem Relation Age of Onset    Hypertension Mother     No Known Problems Father          EXAM & DIAGNOSTICS REVIEWED:   Vitals:     Temp:  [96.5 °F (35.8 °C)-98.3 °F (36.8 °C)]   Temp: 96.5 °F (35.8 °C) (08/01/23 0801)  Pulse: 60 (08/01/23 0801)  Resp: (!) 31 (08/01/23 0801)  BP: (!) 96/53 (08/01/23 0801)  SpO2: (!) 91 % (08/01/23 0801)    Intake/Output Summary (Last 24 hours) at 8/1/2023 0935  Last data filed at 8/1/2023 0701  Gross per 24 hour   Intake 1170 ml   Output 1175 ml   Net -5 ml     Vent Mode: A/C  Oxygen Concentration (%):  [50] 50  Resp Rate Total:  [24 br/min-39 br/min] 28 br/min  Vt Set:  [0 mL-365 mL] 365 mL  PEEP/CPAP:  [5 cmH20-10 cmH20] 5 cmH20  Pressure Support:  [0 tuN92-55 cmH20] 0 cmH20  Mean Airway Pressure:  [14 llR73-68 cmH20] 14 cmH20     General:  Frail elderly lady, laying in bed, trach to vent FiO2 50 %   Eyes:  Anicteric, L eye better   ENT:  Moist oral mucosa, edentulous   Neck:  Supple  Lungs: Better air entry b/l  Heart:  S1/S2+, regular rhythm   Abd:  Slightly distended, still soft to palpation,+ bowel sounds, PEG in place  :  Hamilton, urine yellow  Musc:  Joints without effusion, swelling, erythema, synovitis; positive for muscle  "wasting.   Skin:  See pictures.  Positive for dry skin throughout.  No palmar or plantar lesions. No subungual petechiae  Neuro:        Gaze  towards the left side today, turned  Psych    Lymphatic:       Extrem: No edema, erythema, phlebitis, cellulitis, warm and well perfused  VAD:  Peripheral IV     Midline L 7/7     Isolation:  Contact isolation/[place] resident  Wound:         Lines/Tubes/Drains:    General Labs reviewed:  Recent Labs   Lab 07/30/23  0307 07/30/23  0315 07/31/23  0340 08/01/23  0316 08/01/23  0320   WBC 8.59  --  9.61  --  7.07   HGB 8.1*  --  7.9*  --  8.3*   HCT 27.9*   < > 27.0* 28* 28.4*     --  273  --  296    < > = values in this interval not displayed.       Recent Labs   Lab 07/30/23  0307 07/31/23  0340 08/01/23  0320    138 139   K 4.1 3.6 3.8    101 103   CO2 29 30* 30*   BUN 27* 17 14   CREATININE <0.3* 0.3* <0.3*   CALCIUM 9.0 8.9 9.1   PROT 8.2 8.3 9.0*   BILITOT 0.9 0.9 0.6   ALKPHOS 120 125 126   ALT 22 19 19   AST 18 14 17     No results for input(s): "CRP" in the last 168 hours.    CrCl cannot be calculated (This lab value cannot be used to calculate CrCl because it is not a number: <0.3).      Micro:  Microbiology Results (last 7 days)       Procedure Component Value Units Date/Time    Stool culture [[id number]] Collected: 08/01/23 0629    Order Status: Sent Specimen: Stool Updated: 08/01/23 0740    Clostridium difficile EIA [[id number]] Collected: 08/01/23 0629    Order Status: Canceled Specimen: Stool     Culture, Respiratory with Gram Stain [[id number]]  (Abnormal)  (Susceptibility) Collected: 07/24/23 1148    Order Status: Completed Specimen: Respiratory from Sputum Updated: 07/29/23 1039     Respiratory Culture PSEUDOMONAS AERUGINOSA   Many  MDRO  Isolate sent out for reference testing       Gram Stain (Respiratory) <10 epithelial cells per low power field.     Gram Stain (Respiratory) Many WBC's     Gram Stain (Respiratory) Moderate Gram negative rods "    Culture, ID (Consult) [[id number]]  (Abnormal)  (Susceptibility) Collected: 07/24/23 1148    Order Status: Completed Specimen: Respiratory from Sputum Updated: 07/29/23 0912     Culture, Identification (consult) PSEUDOMONAS AERUGINOSA     Blood culture x two cultures. Draw prior to antibiotics. [[id number]] Collected: 07/22/23 2044    Order Status: Completed Specimen: Blood Updated: 07/27/23 2232     Blood Culture, Routine No growth after 5 days.    Narrative:      Aerobic and anaerobic  Collection has been rescheduled by 2MB at 07/22/2023 19:43 Reason: Amairani liz said she will call when she wants cultures drawn  Collection has been rescheduled by 2MB at 07/22/2023 19:43 Reason: Amairani liz said she will call when she wants cultures drawn    Blood culture x two cultures. Draw prior to antibiotics. [[id number]] Collected: 07/22/23 2050    Order Status: Completed Specimen: Blood Updated: 07/27/23 2232     Blood Culture, Routine No growth after 5 days.    Narrative:      Aerobic and anaerobic  Collection has been rescheduled by 2MB at 07/22/2023 19:43 Reason: Amairani liz said she will call when she wants cultures drawn  Collection has been rescheduled by 2MB at 07/22/2023 19:43 Reason: Amairani liz said she will call when she wants cultures drawn    Urine culture [[id number]] Collected: 07/23/23 1602    Order Status: Completed Specimen: Urine Updated: 07/26/23 0703     Urine Culture, Routine No growth    Narrative:      Specimen Source->Urine             Specimen: Respiratory from Sputum Updated: 07/26/23 0831    Respiratory Culture PSEUDOMONAS AERUGINOSA    Many   MDRO   Isolate sent out for reference testing    Abnormal     Gram Stain (Respiratory) <10 epithelial cells per low power field.    Gram Stain (Respiratory) Many WBC's    Gram Stain (Respiratory) Moderate Gram negative rods   Susceptibility     PSEUDOMONAS AERUGINOSA      CULTURE, RESPIRATORY (Preliminary)     Cefepime 16 mcg/mL  Intermediate     Ciprofloxacin >2 mcg/mL Resistant     Gentamicin >8 mcg/mL Resistant     Levofloxacin >4 mcg/mL Resistant     Meropenem >8 mcg/mL Resistant     Piperacillin/Tazo 64 mcg/mL Sensitive     Tobramycin >8 mcg/mL Resistant             Collected: 07/24/23 1148   Order Status: Completed Specimen: Respiratory from Sputum Updated: 07/29/23 0912    Culture, Identification (consult) PSEUDOMONAS AERUGINOSA  Abnormal    Susceptibility     PSEUDOMONAS AERUGINOSA      CULTURE, ID (CONSULT)     Ceftazidime/Avibactam 12 mcg/mL Resistant     Ceftolozane/Tazobactam 1 mcg/mL Sensitive            Imaging Reviewed:  CXR latest: no significant change.    Chest x-ray 07/22/2023.      Slight increasing infiltrates in the lungs.    CT 07/22/2023.     1.  No acute aortic abnormality or pulmonary embolism.  2.  Bilateral interstitial opacities with interlobular septal thickening and consolidation in the right lower lobe.  Imaging features can be seen with COVID pneumonia, though are nonspecific and can occur with a variety of infectious and noninfectious processes. PneInd.  3.  Mediastinal and bihilar lymphadenopathy.  4.  Small bilateral pleural effusions.  5.  No acute abdominal or pelvic abnormality.  6.  Splenomegaly.  7.  Hepatic steatosis.      Cardiology:    IMPRESSION & PLAN     Sepsis + hypothermia resolved, secondary to VAP, trach exchanged 7/27, FiO2 50%  Respiratory culture Pseudomonas,  - Zosyn resistant as well, SOPHIA >16 R Avycaz, only S to Zerbaxa    MRSA nasal swab not detected   Urine culture no growth    Blood cultures x 2 no growth      2. Resident of [facility], known to ID service from prior visits, w/ PMHx of MAICO 2020, vegetative state, vent dependent, tracheostomy, PEG, PE, HTN, GERD, anemia, osteopenia, acute cholecystitis status post IR drainage in January '22, right arm infection in August '22, MDRs infections like Pseudomonas , Acinetobacter , MRSA, Proteus mirabilis ESBL  etc.Splenomegaly, hepato steatosis      Recommendations:  Continue Zerbaxa (D2) 3g IV q8h, will complete 5 days  CM to see if Zerbaxa can be continued at facility   Monitor O2 sats  Aspiration precautions     D/w ICU nursing, Dr Ritchie     Medical Decision Making during this encounter was  [_] Low Complexity  [_] Moderate Complexity  [ xxx ] High Complexity

## 2023-08-01 NOTE — PLAN OF CARE
JOO spoke with She (Duyen Lucas). Per She, Pt able to be accepted back today 8/1. DC orders and AVS sent via [facility]. She provided SW with information to ulises report ( [phone], to Yoseph). KadeemRN called janis. Corea Tiger to arrange ambulance transport. JASBIR Quan notified. Pt has no other needs to be addressed at this time. Pt cleared to discharge by case management.       08/01/23 0264   Final Note   Assessment Type Final Discharge Note   Anticipated Discharge Disposition Int Care Fac   What phone number can be called within the next 1-3 days to see how you are doing after discharge? [phone]   Hospital Resources/Appts/Education Provided Provided patient/caregiver with written discharge plan information   Post-Acute Status   Post-Acute Authorization Placement   Post-Acute Placement Status Set-up Complete/Auth obtained   Coverage Payor:  MEDICARE - MEDICARE PART A & B   Discharge Delays None known at this time

## 2023-08-01 NOTE — DISCHARGE SUMMARY
[facility] Medicine  Discharge Summary      Patient Name: Genna Felix  MRN: [id number]  [facility]: [id number]  Patient Class: IP- Inpatient  Admission Date: 7/22/2023  Hospital Length of Stay: 10 days  Discharge Date and Time: No discharge date for patient encounter.  Attending Physician: Kirill Garcia MD   Discharging Provider: Kirill Garcia MD  Primary Care Provider: Primary Doctor Nikky    Primary Care Team: Networked reference to record PCT     HPI:   Patient presents to ER due to respiratory distress.     Spoke with sister, Amelia. States that she was told her oxygen was dropping low at Ms. Felix's nursing home, [place]. Was transferred to the ER. Called [facility] for more info and spoke with Ms. Yousif (a nurse who got sign out from the day team about patient). Was told that Respiratory was trying to improve her sats throughout the day however it never improved above 87%. Patient ws then transferred to ER. Staff noticed urine output was dirty with sediment so u/a and urine culture ordered.      * No surgery found *      Hospital Course:   Genna Felix is a 62 year old female with a past medical history of anoxic brain injury s/p G tube and tracheostomy, PE, GERD, HTN, hepatic steatosis, obesity, and anemia who presented with severe sepsis with acute respiratory failure requiring mechanical ventilation. Respiratory cultures show  Pseudomonas. ID has been consulted and started Avycaz. She is to complete five days of Zerbaxa. Pulmonary has also been consulted for ventilator management. Her course has been complicated by tracheostomy cuff leak; Pulmonary replaced the cuff 7/27. She is intermittently hypoxic and it has been difficult weaning the ventilator during her course (the patient was not chronically ventilated on admission). She also developed SVT for which scheduled metoprolol has been ordered. Cardiology was consulted. She also was noted to have a right shoulder  dislocation on CTA for which Orthopedic Surgery has been consulted. She has been unable to be weaned from mechanical ventilation at this time; she was discharged back to [facility]. She should follow up with Ortho in the outpatient setting.       Goals of Care Treatment Preferences:  Code Status: Full Code       LaPOST: Yes           Consults:   Consults (From admission, onward)        Status Ordering Provider     Inpatient consult to Orthopedic Surgery  Once        Provider:  Andrzej Pathka MD    Acknowledged CAROLYNN SMITH     Inpatient consult to Cardiology  Once        Provider:  Rogelio Last MD    Completed CAROLYNN SMITH     Inpatient consult to Infectious Diseases  Once        Provider:  Yadira Mccormack MD    Completed FROILAN BURNETT     Inpatient consult to Registered Dietitian/Nutritionist  Once        Provider:  (Not yet assigned)    Completed FELIPA CRUZ     Pulmonology  Once        Provider:  Dora Montesinos MD    Completed FELIPA CRUZ     Inpatient consult to Registered Dietitian/Nutritionist  Once        Provider:  (Not yet assigned)    Completed FELIPA CRUZ          No new Assessment & Plan notes have been filed under this hospital service since the last note was generated.  Service: Hospital Medicine    Final Active Diagnoses:    Diagnosis Date Noted POA    PRINCIPAL PROBLEM:  Chronic respiratory failure [J96.10] 07/29/2022 Yes    Paroxysmal SVT (supraventricular tachycardia) [I47.1] 07/28/2023 Yes    Obesity (BMI 30-39.9) [E66.9] 07/26/2023 Yes    Hepatic steatosis [K76.0] 07/26/2023 Yes    Pressure injury of buttock, stage 2 [L89.302] 07/24/2023 Yes    Pneumonia of both lungs due to infectious organism [J18.9] 07/22/2023 Yes    Essential hypertension [I10] 10/24/2021 Yes    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] 05/01/2021 Not Applicable    PEG (percutaneous endoscopic gastrostomy) status [Z93.1] 05/01/2021 Not Applicable    Dislocation of right shoulder joint  [S43.004A] 04/22/2021 Yes    Anemia of chronic disease [D63.8] 04/21/2021 Yes    Persistent vegetative state [R40.3] 04/21/2021 Yes      Problems Resolved During this Admission:    Diagnosis Date Noted Date Resolved POA    Tracheostomy malfunction [J95.03] 07/28/2023 08/01/2023 Yes       Discharged Condition: stable    Disposition: Long Term Care    Follow Up:    Patient Instructions:      Ambulatory referral/consult to Orthopedics   Standing Status: Future   Referral Priority: Routine Referral Type: Consultation   Requested Specialty: Orthopedic Surgery   Number of Visits Requested: 1     Notify your health care provider if you experience any of the following:  increased confusion or weakness     Notify your health care provider if you experience any of the following:  persistent dizziness, light-headedness, or visual disturbances     Notify your health care provider if you experience any of the following:  worsening rash     Notify your health care provider if you experience any of the following:  severe persistent headache     Notify your health care provider if you experience any of the following:  difficulty breathing or increased cough     Notify your health care provider if you experience any of the following:  severe uncontrolled pain     Notify your health care provider if you experience any of the following:  persistent nausea and vomiting or diarrhea     Notify your health care provider if you experience any of the following:  temperature >100.4     Activity as tolerated       Significant Diagnostic Studies: Labs: All labs within the past 24 hours have been reviewed    Pending Diagnostic Studies:     Procedure Component Value Units Date/Time    Urinalysis Catheterized [[id number]] Collected: 07/23/23 1602    Order Status: Sent Lab Status: In process Updated: 07/23/23 1602    Specimen: Urine, Catheterized          Medications:  Reconciled Home Medications:      Medication List      START taking these  medications    dextrose 5 % (D5W) SolP 100 mL with ceftolozane-tazobactam 1.5 gram SolR 3,000 mg injection  Inject 3,000 mg into the vein every 8 (eight) hours. for 4 days     metoprolol tartrate 50 MG tablet  Commonly known as: LOPRESSOR  0.5 tablets (25 mg total) by Per G Tube route 2 (two) times daily.        CONTINUE taking these medications    acetaminophen 325 MG tablet  Commonly known as: TYLENOL  2 tablets (650 mg total) by Per G Tube route every 4 (four) hours as needed for Pain or Temperature greater than (100.4F).     acetylcysteine 100 mg/ml (10%) 100 mg/mL (10 %) nebulizer solution  Commonly known as: MUCOMYST  Take 4 mLs by nebulization every 8 (eight) hours.     albuterol-ipratropium 2.5 mg-0.5 mg/3 mL nebulizer solution  Commonly known as: DUO-NEB  Take 3 mLs by nebulization every 6 (six) hours. Rescue     ARTIFICIAL TEARS(CVOD01-NUQAV) ophthalmic solution  Generic drug: dextran 70-hypromellose  Place 1 drop into both eyes 4 (four) times daily as needed.     CLEAR EYES NATURAL TEARS 0.5-0.6 % Drop  Generic drug: polyvinyl alcohol-povidone  Apply 1 drop to eye daily as needed.     cycloSPORINE 0.05 % ophthalmic emulsion  Commonly known as: RESTASIS  1 drop 2 (two) times daily. 0900  2100     ergocalciferol 50,000 unit Cap  Commonly known as: ERGOCALCIFEROL  Take 50,000 Units by mouth every Saturday.     ISOSOURCE ORAL  1.5 mg by Per G Tube route continuous. 1.5 per peg tube at 50ml/hr continuously     XIOMARA ORAL  1 Package by PEG Tube route 2 (two) times a day. In 8oz of water     LINZESS 145 mcg Cap capsule  Generic drug: linaCLOtide  Take 145 mcg by mouth before breakfast.     miconazole 2 % cream  Commonly known as: MICOTIN  Apply topically 2 (two) times daily. Reason:  Tinea pedis for 14 days     ondansetron 4 MG tablet  Commonly known as: ZOFRAN  4 mg by Per G Tube route every 8 (eight) hours as needed for Nausea.     polyethylene glycol 17 gram Pwpk  Commonly known as: GLYCOLAX  Take 17 g by  mouth daily as needed.        STOP taking these medications    cefTRIAXone 1 gram injection  Commonly known as: ROCEPHIN     ferrous sulfate 300 mg (60 mg iron)/5 mL syrup     midodrine 10 MG tablet  Commonly known as: PROAMATINE            Indwelling Lines/Drains at time of discharge:   Lines/Drains/Airways     Drain  Duration                Biliary Tube RUQ -- days         Gastrostomy/Enterostomy LUQ -- days         Biliary Tube 01/13/22 565 days         Urethral Catheter 07/24/23 1030 Silicone 16 Fr. 8 days          Airway  Duration           Adult Surgical Airway 07/27/23 1814 Shiley 8.0 mm DIC cuffed Extra Large Cuffed Distal 8.0 / 85 mm 8.0 4 days                Time spent on the discharge of patient: 33 minutes         Kirill Garcia MD  Department of Hospital Medicine  [facility]

## 2023-08-01 NOTE — SUBJECTIVE & OBJECTIVE
"Interval History: see "Hospital Course"    Review of Systems   Unable to perform ROS: Patient nonverbal     Objective:     Vital Signs (Most Recent):  Temp: 96.5 °F (35.8 °C) (08/01/23 0801)  Pulse: 60 (08/01/23 0801)  Resp: (!) 31 (08/01/23 0801)  BP: (!) 96/53 (08/01/23 0801)  SpO2: (!) 91 % (08/01/23 0801) Vital Signs (24h Range):  Temp:  [96.5 °F (35.8 °C)-98.3 °F (36.8 °C)] 96.5 °F (35.8 °C)  Pulse:  [38-82] 60  Resp:  [22-41] 31  SpO2:  [85 %-100 %] 91 %  BP: ()/(50-73) 96/53     Weight: 92.7 kg (204 lb 5.9 oz)  Body mass index is 41.85 kg/m².    Intake/Output Summary (Last 24 hours) at 8/1/2023 1027  Last data filed at 8/1/2023 0701  Gross per 24 hour   Intake 1170 ml   Output 1175 ml   Net -5 ml         Physical Exam  Vitals and nursing note reviewed.   Constitutional:       General: She is not in acute distress.     Appearance: She is ill-appearing.   HENT:      Head: Normocephalic and atraumatic.      Right Ear: External ear normal.      Left Ear: External ear normal.      Nose: Nose normal.      Mouth/Throat:      Mouth: Mucous membranes are moist.      Pharynx: Oropharynx is clear.   Eyes:      Extraocular Movements: Extraocular movements intact.      Conjunctiva/sclera: Conjunctivae normal.   Neck:      Comments: Tracheostomy.  Cardiovascular:      Rate and Rhythm: Normal rate and regular rhythm.      Pulses: Normal pulses.      Heart sounds: Normal heart sounds.   Pulmonary:      Breath sounds: Normal breath sounds.      Comments: MV.  Abdominal:      General: Bowel sounds are normal.      Palpations: Abdomen is soft.      Tenderness: There is no abdominal tenderness.      Comments: G tube.   Genitourinary:     Comments: Hamilton.  Musculoskeletal:         General: Normal range of motion.   Skin:     General: Skin is warm and dry.   Neurological:      Mental Status: Mental status is at baseline.             Significant Labs: All pertinent labs within the past 24 hours have been " reviewed.    Significant Imaging: I have reviewed all pertinent imaging results/findings within the past 24 hours.

## 2023-08-01 NOTE — CARE UPDATE
07/31/23 1913   Patient Assessment/Suction   Level of Consciousness (AVPU) responds to voice   Respiratory Effort Unlabored   Expansion/Accessory Muscles/Retractions expansion symmetric   All Lung Fields Breath Sounds coarse   Rhythm/Pattern, Respiratory assisted mechanically   Cough Frequency infrequent   Cough Type assisted;productive   Suction Method tracheal   $ Suction Charges Inline Suction Procedure Stat Charge   Secretions Amount moderate   Secretions Color white   Secretions Characteristics thin   PRE-TX-O2   Device (Oxygen Therapy) ventilator   $ Is the patient on Low Flow Oxygen? Yes   Oxygen Concentration (%) 50   SpO2 (!) 94 %   Pulse Oximetry Type Continuous   $ Pulse Oximetry - Multiple Charge Pulse Oximetry - Multiple   Pulse 64   Resp (!) 37   Adult Surgical Airway 07/27/23 1814 Shiley 8.0 mm DIC cuffed Extra Large Cuffed Distal 8.0 / 85 mm 8.0   Placement Date/Time: 07/27/23 1814   Present Prior to Hospital Arrival?: No  Inserted by: MD  Placed By: ICU physician  Type: Tracheostomy  Brand: Shiley  Airway Device Size and Style: 8.0 mm DIC cuffed  Airway Device Style: Extra Large Cuffed Distal ...   Cuff Pressure   (mlt per isol)   Cuff Inflation? Inflated   Status Secured   Site Assessment Clean;Dry;No bleeding   Ties Assessment Clean;Dry;Intact;Secure   Vent Select   Charged w/in last 24h YES   Preset Conventional Ventilator Settings   Vent ID 2   Vent Type    Ventilation Type VC   Vent Mode A/C   Humidity HME   Set Rate 24 BPM   Vt Set 365 mL   PEEP/CPAP 5 cmH20   Pressure Support 0 cmH20   Waveform RAMP   Peak Flow 55 L/min   Plateau Set/Insp. Hold (sec) 0   Trigger Sensitivity Flow/I-Trigger 2 L/min   Patient Ventilator Parameters   Resp Rate Total 25 br/min   Peak Airway Pressure 38 cmH20   Mean Airway Pressure 14 cmH20   Plateau Pressure 0 cmH20   Exhaled Vt 411 mL   Total Ve 9.6 L/m   I:E Ratio Measured 1:2.50   Conventional Ventilator Alarms   Alarms On Y   Resp Rate High Alarm 40  br/min   Press High Alarm 55 cmH2O   Apnea Rate 24   Apnea Volume (mL) 0 mL   Apnea Oxygen Concentration  100   Apnea Flow Rate (L/min) 60   T Apnea 25 sec(s)   Ready to Wean/Extubation Screen   FIO2<=50 (chart decimal) 0.5   MV<16L (chart vol.) 9.6   PEEP <=8 (chart #) 5   Ready to Wean Parameters   F/VT Ratio<105 (RSBI) (!) 90.02   Education   $ Education Ventilator Oxygen;15 min

## 2023-08-01 NOTE — DISCHARGE INSTRUCTIONS
Formerly [facility]  Facility Transfer Orders        Admit to: [facility]    Diagnoses:   Active Hospital Problems    Diagnosis  POA    *Chronic respiratory failure [J96.10]  Yes     Priority: 1 - High    Paroxysmal SVT (supraventricular tachycardia) [I47.1]  Yes     Priority: 2     Obesity (BMI 30-39.9) [E66.9]  Yes    Hepatic steatosis [K76.0]  Yes    Pressure injury of buttock, stage 2 [L89.302]  Yes    Pneumonia of both lungs due to infectious organism [J18.9]  Yes    Essential hypertension [I10]  Yes    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]  Not Applicable    PEG (percutaneous endoscopic gastrostomy) status [Z93.1]  Not Applicable    Dislocation of right shoulder joint [S43.004A]  Yes    Anemia of chronic disease [D63.8]  Yes    Persistent vegetative state [R40.3]  Yes      Resolved Hospital Problems    Diagnosis Date Resolved POA    Tracheostomy malfunction [J95.03] 08/01/2023 Yes     Priority: 1 - High     Allergies: Review of patient's allergies indicates:  No Known Allergies    Code Status: FULL    Vitals: Routine       Diet:  Continue isosource    Activity: Activity as tolerated    Nursing Precautions: Aspiration , Fall, and Pressure ulcer prevention    Bed/Surface: Low Air Loss    Consults: Wound Care and Nutrition to evaluate and recommend diet    Oxygen: VC; A/C; RR 16; ; PEEP 10; FiO2 50%; I time 1      Miscellaneous Care:   PEG Care:  Clean site every 24 hours  Hamilton Care: Empty Hamilton bag every shift.  Change Hamilton every month  Routine Skin for Bedridden Patients:  Apply moisture barrier cream to all    IV Access: Mid-line     Medications: Discontinue all previous medication orders, if any. See new list below.  Current Discharge Medication List        START taking these medications    Details   dextrose 5 % (D5W) SolP 100 mL with ceftolozane-tazobactam 1.5 gram SolR 3,000 mg injection Inject 3,000 mg into the vein every 8 (eight) hours. for 4 days      metoprolol tartrate (LOPRESSOR) 50 MG  tablet 0.5 tablets (25 mg total) by Per G Tube route 2 (two) times daily.  Qty: 30 tablet, Refills: 11    Comments: .           CONTINUE these medications which have NOT CHANGED    Details   acetaminophen (TYLENOL) 325 MG tablet 2 tablets (650 mg total) by Per G Tube route every 4 (four) hours as needed for Pain or Temperature greater than (100.4F).  Refills: 0      acetylcysteine 100 mg/ml, 10%, (MUCOMYST) 100 mg/mL (10 %) nebulizer solution Take 4 mLs by nebulization every 8 (eight) hours.  Refills: 12      albuterol-ipratropium (DUO-NEB) 2.5 mg-0.5 mg/3 mL nebulizer solution Take 3 mLs by nebulization every 6 (six) hours. Rescue  Qty: 1 Box, Refills: 0      arginine/glutamine/calcium bmb (XIOMARA ORAL) 1 Package by PEG Tube route 2 (two) times a day. In 8oz of water      cycloSPORINE (RESTASIS) 0.05 % ophthalmic emulsion 1 drop 2 (two) times daily. 0900  2100      dextran 70-hypromellose (ARTIFICIAL TEARS,[id number],) ophthalmic solution Place 1 drop into both eyes 4 (four) times daily as needed.      ergocalciferol (ERGOCALCIFEROL) 50,000 unit Cap Take 50,000 Units by mouth every Saturday.      lactose-reduced food (ISOSOURCE ORAL) 1.5 mg by Per G Tube route continuous. 1.5 per peg tube at 50ml/hr continuously      linaCLOtide (LINZESS) 145 mcg Cap capsule Take 145 mcg by mouth before breakfast.      miconazole (MICOTIN) 2 % cream Apply topically 2 (two) times daily. Reason:  Tinea pedis for 14 days  Refills: 0      ondansetron (ZOFRAN) 4 MG tablet 4 mg by Per G Tube route every 8 (eight) hours as needed for Nausea.      polyethylene glycol (GLYCOLAX) 17 gram PwPk Take 17 g by mouth daily as needed.      polyvinyl alcohol-povidone (CLEAR EYES NATURAL TEARS) 0.5-0.6 % Drop Apply 1 drop to eye daily as needed.           STOP taking these medications       cefTRIAXone (ROCEPHIN) 1 gram injection Comments:   Reason for Stopping:         ferrous sulfate 300 mg (60 mg iron)/5 mL syrup Comments:   Reason for Stopping:          midodrine (PROAMATINE) 10 MG tablet Comments:   Reason for Stopping:             Follow up:       Immunizations Administered as of 8/1/2023       No immunizations on file.              Kirill Garcia MD

## 2023-08-02 NOTE — ASSESSMENT & PLAN NOTE
Continue IVAB.  She's on meropenem.  Urine culture has Proteus with ESBL..     [FreeTextEntry1] : CTA negative for PE No further symptoms  HTN: BP well controlled, continue meds  Mild DD: On TTE, monitor echos, BP control  Mild AS: Serial echos, no symptoms  RV 6M [EKG obtained to assist in diagnosis and management of assessed problem(s)] : EKG obtained to assist in diagnosis and management of assessed problem(s)

## 2023-08-02 NOTE — RESPIRATORY THERAPY
08/01/23 2011   Patient Assessment/Suction   Respiratory Effort Normal;Unlabored   Expansion/Accessory Muscles/Retractions no use of accessory muscles   All Lung Fields Breath Sounds Anterior:;coarse   Rhythm/Pattern, Respiratory assisted mechanically   Cough Frequency with stimulation   Cough Type assisted   Suction Method tracheal;oral   Suction Pressure (mmHg) -120 mmHg   $ Suction Charges Inline Suction Procedure Stat Charge   Secretions Amount moderate   Secretions Color white   Secretions Characteristics thick   $ Swab or suction? Suction   Aspirate Toleration TENA (no adverse reactions)   Skin Integrity   $ Wound Care Tech Time 15 min   Area Observed Neck under tracheostomy;Neck   Skin Appearance without discoloration   Barrier used? Other (see comments)  (gauze drain sponge)   Barrier Changed? Yes   PRE-TX-O2   Device (Oxygen Therapy) ventilator   Oxygen Concentration (%) 40   SpO2 (!) 93 %   Pulse Oximetry Type Continuous   $ Pulse Oximetry - Multiple Charge Pulse Oximetry - Multiple   Pulse 62   Resp (!) 27   Adult Surgical Airway 07/27/23 1814 Shiley 8.0 mm DIC cuffed Extra Large Cuffed Distal 8.0 / 85 mm 8.0   Placement Date/Time: 07/27/23 1814   Present Prior to Hospital Arrival?: No  Inserted by: MD  Placed By: ICU physician  Type: Tracheostomy  Brand: Shiley  Airway Device Size and Style: 8.0 mm DIC cuffed  Airway Device Style: Extra Large Cuffed Distal ...   Cuff Inflation? Inflated   Status Secured   Site Assessment Clean;Dry   Site Care Cleansed;Dried;Dressing applied   Inner Cannula Care Changed/new   Ties Assessment Clean;Dry;Intact;Secure   Airway Safety   Is Ambu Bag and Mask with Patient? Yes, Adult Ambu Bag and Mask   Trach Supplies at Bedside Inner Cannula;Suction Catheter;Normal Saline Bullets;Trach Ties;10cc Syringes;Water-based Lubricant;Obturator;Ambu Bag and Mask (in patients room)   Suction set is at the bedside? Yes   Extra trach at bedside? Yes   Extra trach sizes at bedside? 6    Is Obturator Available? Yes   Location of Obturator?  Bedside table   Respiratory Interventions   Airway/Ventilation Management airway patency maintained   Vent Select   Conventional Vent Y   Charged w/in last 24h YES   Preset Conventional Ventilator Settings   Vent ID 2   Vent Type    Ventilation Type VC   Vent Mode A/C   Humidity HME   Set Rate 18 BPM   Vt Set 365 mL   PEEP/CPAP 8 cmH20   Pressure Support 0 cmH20   Waveform RAMP   Peak Flow 55 L/min   Plateau Set/Insp. Hold (sec) 0   Trigger Sensitivity Flow/I-Trigger 2 L/min   Patient Ventilator Parameters   Resp Rate Total 30 br/min   Peak Airway Pressure 34 cmH20   Mean Airway Pressure 18 cmH20   Plateau Pressure 0 cmH20   Exhaled Vt 401 mL   Total Ve 11.7 L/m   I:E Ratio Measured 1:1.90   Conventional Ventilator Alarms   Alarms On Y   Resp Rate High Alarm 40 br/min   Press High Alarm 55 cmH2O   Apnea Rate 24   Apnea Volume (mL) 0 mL   Apnea Oxygen Concentration  100   Apnea Flow Rate (L/min) 60   T Apnea 25 sec(s)   IHI Ventilator Associated Pneumonia Bundle (Required)   Head of Bed Elevated  HOB 30   Oral Care Mouth suctioned;Mouth swabbed   Ready to Wean/Extubation Screen   FIO2<=50 (chart decimal) 0.4   MV<16L (chart vol.) 11.7   PEEP <=8 (chart #) 8   Ready to Wean Parameters   F/VT Ratio<105 (RSBI) (!) 67.33   Education   $ Education Ventilator Oxygen;Trach Care;Suction;15 min

## 2023-08-02 NOTE — NURSING
Patient discharged via [facility]MS at approximately 2050 with a gurney travel ventilator and a monitoring device. All vital signs are within normal limits and handoff was given to AMS.

## 2023-08-02 NOTE — PLAN OF CARE
JOO received call from Jeronimo ([facility]). Per Jeronimo, Pt's claim for Zerbaxa denied by insurance. Per Jeronimo, medication would be $6,000 [facility] insurance and facility would like generic medication if able. JOO sent message to ID to determine if a generic would be appropriate and able to be called in.

## 2023-08-03 ENCOUNTER — HOSPITAL ENCOUNTER (INPATIENT)
Facility: HOSPITAL | Age: 63
LOS: 5 days | DRG: 207 | End: 2023-08-08
Attending: EMERGENCY MEDICINE | Admitting: STUDENT IN AN ORGANIZED HEALTH CARE EDUCATION/TRAINING PROGRAM
Payer: MEDICARE

## 2023-08-03 DIAGNOSIS — R40.3 PERSISTENT VEGETATIVE STATE: Primary | ICD-10-CM

## 2023-08-03 DIAGNOSIS — J18.9 PNEUMONIA: ICD-10-CM

## 2023-08-03 PROBLEM — L89.153 DECUBITUS ULCER OF SACRAL REGION, STAGE 3: Status: RESOLVED | Noted: 2023-07-07 | Resolved: 2023-08-03

## 2023-08-03 PROBLEM — T14.8XXA DEEP TISSUE INJURY: Status: RESOLVED | Noted: 2021-03-16 | Resolved: 2023-08-03

## 2023-08-03 LAB
ALBUMIN SERPL BCP-MCNC: 3 G/DL (ref 3.5–5.2)
ALP SERPL-CCNC: 122 U/L (ref 55–135)
ALT SERPL W/O P-5'-P-CCNC: 20 U/L (ref 10–44)
ANION GAP SERPL CALC-SCNC: 4 MMOL/L (ref 8–16)
AST SERPL-CCNC: 27 U/L (ref 10–40)
BASOPHILS # BLD AUTO: 0.01 K/UL (ref 0–0.2)
BASOPHILS NFR BLD: 0.2 % (ref 0–1.9)
BILIRUB SERPL-MCNC: 0.6 MG/DL (ref 0.1–1)
BUN SERPL-MCNC: 18 MG/DL (ref 6–20)
CALCIUM SERPL-MCNC: 8.9 MG/DL (ref 8.7–10.5)
CHLORIDE SERPL-SCNC: 104 MMOL/L (ref 95–110)
CO2 SERPL-SCNC: 28 MMOL/L (ref 23–29)
CREAT SERPL-MCNC: <0.3 MG/DL (ref 0.5–1.4)
DIFFERENTIAL METHOD: ABNORMAL
EOSINOPHIL # BLD AUTO: 0.3 K/UL (ref 0–0.5)
EOSINOPHIL NFR BLD: 5.4 % (ref 0–8)
ERYTHROCYTE [DISTWIDTH] IN BLOOD BY AUTOMATED COUNT: 21 % (ref 11.5–14.5)
EST. GFR  (NO RACE VARIABLE): >60 ML/MIN/1.73 M^2
GLUCOSE SERPL-MCNC: 86 MG/DL (ref 70–110)
HCT VFR BLD AUTO: 28.5 % (ref 37–48.5)
HGB BLD-MCNC: 8.3 G/DL (ref 12–16)
IMM GRANULOCYTES # BLD AUTO: 0.02 K/UL (ref 0–0.04)
IMM GRANULOCYTES NFR BLD AUTO: 0.4 % (ref 0–0.5)
LACTATE SERPL-SCNC: 1.2 MMOL/L (ref 0.5–1.9)
LYMPHOCYTES # BLD AUTO: 0.9 K/UL (ref 1–4.8)
LYMPHOCYTES NFR BLD: 16.6 % (ref 18–48)
MCH RBC QN AUTO: 26.5 PG (ref 27–31)
MCHC RBC AUTO-ENTMCNC: 29.1 G/DL (ref 32–36)
MCV RBC AUTO: 91 FL (ref 82–98)
MONOCYTES # BLD AUTO: 0.3 K/UL (ref 0.3–1)
MONOCYTES NFR BLD: 6.6 % (ref 4–15)
NEUTROPHILS # BLD AUTO: 3.7 K/UL (ref 1.8–7.7)
NEUTROPHILS NFR BLD: 70.8 % (ref 38–73)
NRBC BLD-RTO: 0 /100 WBC
PLATELET # BLD AUTO: 306 K/UL (ref 150–450)
PMV BLD AUTO: 11.4 FL (ref 9.2–12.9)
POTASSIUM SERPL-SCNC: 4.3 MMOL/L (ref 3.5–5.1)
PROCALCITONIN SERPL IA-MCNC: <0.05 NG/ML (ref 0–0.5)
PROT SERPL-MCNC: 9.1 G/DL (ref 6–8.4)
RBC # BLD AUTO: 3.13 M/UL (ref 4–5.4)
SODIUM SERPL-SCNC: 136 MMOL/L (ref 136–145)
STOOL CULTURE: NORMAL
STOOL CULTURE: NORMAL
WBC # BLD AUTO: 5.19 K/UL (ref 3.9–12.7)

## 2023-08-03 PROCEDURE — 94761 N-INVAS EAR/PLS OXIMETRY MLT: CPT

## 2023-08-03 PROCEDURE — 27000221 HC OXYGEN, UP TO 24 HOURS

## 2023-08-03 PROCEDURE — 85025 COMPLETE CBC W/AUTO DIFF WBC: CPT | Performed by: EMERGENCY MEDICINE

## 2023-08-03 PROCEDURE — 83605 ASSAY OF LACTIC ACID: CPT | Performed by: EMERGENCY MEDICINE

## 2023-08-03 PROCEDURE — 80053 COMPREHEN METABOLIC PANEL: CPT | Performed by: EMERGENCY MEDICINE

## 2023-08-03 PROCEDURE — 63600175 PHARM REV CODE 636 W HCPCS: Mod: JZ,JG | Performed by: STUDENT IN AN ORGANIZED HEALTH CARE EDUCATION/TRAINING PROGRAM

## 2023-08-03 PROCEDURE — 94799 UNLISTED PULMONARY SVC/PX: CPT

## 2023-08-03 PROCEDURE — 94002 VENT MGMT INPAT INIT DAY: CPT

## 2023-08-03 PROCEDURE — 63600175 PHARM REV CODE 636 W HCPCS: Mod: JZ,JG | Performed by: EMERGENCY MEDICINE

## 2023-08-03 PROCEDURE — 27100171 HC OXYGEN HIGH FLOW UP TO 24 HOURS

## 2023-08-03 PROCEDURE — 87070 CULTURE OTHR SPECIMN AEROBIC: CPT | Performed by: STUDENT IN AN ORGANIZED HEALTH CARE EDUCATION/TRAINING PROGRAM

## 2023-08-03 PROCEDURE — 25000003 PHARM REV CODE 250: Performed by: EMERGENCY MEDICINE

## 2023-08-03 PROCEDURE — 99900026 HC AIRWAY MAINTENANCE (STAT)

## 2023-08-03 PROCEDURE — 87205 SMEAR GRAM STAIN: CPT | Performed by: STUDENT IN AN ORGANIZED HEALTH CARE EDUCATION/TRAINING PROGRAM

## 2023-08-03 PROCEDURE — 20000000 HC ICU ROOM

## 2023-08-03 PROCEDURE — 99285 EMERGENCY DEPT VISIT HI MDM: CPT | Mod: 25

## 2023-08-03 PROCEDURE — 84145 PROCALCITONIN (PCT): CPT | Performed by: EMERGENCY MEDICINE

## 2023-08-03 PROCEDURE — 99900035 HC TECH TIME PER 15 MIN (STAT)

## 2023-08-03 PROCEDURE — 25000003 PHARM REV CODE 250: Performed by: STUDENT IN AN ORGANIZED HEALTH CARE EDUCATION/TRAINING PROGRAM

## 2023-08-03 RX ORDER — MUPIROCIN 20 MG/G
OINTMENT TOPICAL 2 TIMES DAILY
Status: COMPLETED | OUTPATIENT
Start: 2023-08-03 | End: 2023-08-08

## 2023-08-03 RX ORDER — IPRATROPIUM BROMIDE 17 UG/1
AEROSOL, METERED RESPIRATORY (INHALATION)
COMMUNITY

## 2023-08-03 RX ORDER — SODIUM CHLORIDE 0.9 % (FLUSH) 0.9 %
10 SYRINGE (ML) INJECTION
Status: DISCONTINUED | OUTPATIENT
Start: 2023-08-03 | End: 2023-08-04

## 2023-08-03 RX ORDER — METOPROLOL TARTRATE 25 MG/1
25 TABLET, FILM COATED ORAL 2 TIMES DAILY
Status: DISCONTINUED | OUTPATIENT
Start: 2023-08-03 | End: 2023-08-06

## 2023-08-03 RX ORDER — MIDODRINE HYDROCHLORIDE 2.5 MG/1
5 TABLET ORAL
Status: DISCONTINUED | OUTPATIENT
Start: 2023-08-04 | End: 2023-08-08 | Stop reason: HOSPADM

## 2023-08-03 RX ORDER — METOCLOPRAMIDE 10 MG/1
TABLET ORAL
COMMUNITY

## 2023-08-03 RX ORDER — ACETAMINOPHEN 325 MG/1
650 TABLET ORAL EVERY 4 HOURS PRN
Status: DISCONTINUED | OUTPATIENT
Start: 2023-08-03 | End: 2023-08-08 | Stop reason: HOSPADM

## 2023-08-03 RX ORDER — IPRATROPIUM BROMIDE AND ALBUTEROL SULFATE 2.5; .5 MG/3ML; MG/3ML
3 SOLUTION RESPIRATORY (INHALATION) EVERY 6 HOURS PRN
Status: DISCONTINUED | OUTPATIENT
Start: 2023-08-03 | End: 2023-08-08 | Stop reason: HOSPADM

## 2023-08-03 RX ORDER — SCOLOPAMINE TRANSDERMAL SYSTEM 1 MG/1
PATCH, EXTENDED RELEASE TRANSDERMAL
COMMUNITY

## 2023-08-03 RX ORDER — FAMOTIDINE 10 MG/ML
20 INJECTION INTRAVENOUS EVERY 12 HOURS
Status: DISCONTINUED | OUTPATIENT
Start: 2023-08-03 | End: 2023-08-08 | Stop reason: HOSPADM

## 2023-08-03 RX ORDER — PROMETHAZINE HYDROCHLORIDE 12.5 MG/1
TABLET ORAL
COMMUNITY

## 2023-08-03 RX ORDER — ONDANSETRON 4 MG/1
TABLET, ORALLY DISINTEGRATING ORAL
COMMUNITY
Start: 2023-08-01

## 2023-08-03 RX ORDER — ONDANSETRON 2 MG/ML
4 INJECTION INTRAMUSCULAR; INTRAVENOUS EVERY 8 HOURS PRN
Status: DISCONTINUED | OUTPATIENT
Start: 2023-08-03 | End: 2023-08-08 | Stop reason: HOSPADM

## 2023-08-03 RX ORDER — MIDODRINE HYDROCHLORIDE 5 MG/1
TABLET ORAL
COMMUNITY

## 2023-08-03 RX ADMIN — FAMOTIDINE 20 MG: 10 INJECTION INTRAVENOUS at 09:08

## 2023-08-03 RX ADMIN — MUPIROCIN 1 G: 20 OINTMENT TOPICAL at 09:08

## 2023-08-03 RX ADMIN — CEFTOLOZANE AND TAZOBACTAM 1500 MG: 1; .5 INJECTION, POWDER, LYOPHILIZED, FOR SOLUTION INTRAVENOUS at 09:08

## 2023-08-03 RX ADMIN — CEFTOLOZANE AND TAZOBACTAM 1500 MG: 1; .5 INJECTION, POWDER, LYOPHILIZED, FOR SOLUTION INTRAVENOUS at 02:08

## 2023-08-03 NOTE — ED PROVIDER NOTES
Encounter Date: 8/3/2023       History     Chief Complaint   Patient presents with    OTHER     Vent pt from [facility] recently d/c  8/2/23 with Rx for ceftolozane-tazobactum 1.5gm d5osuoc x 4 days. [facility]  unable to get insurance to approve meds. Sent pt back to be readmitted for the rest of treatment.      This is a 62-year-old female with history of anoxic brain injury, with tracheostomy, currently on a ventilator, it just discharged yesterday after admission for multi-drug resistant Pseudomonas, discharge back to [facility] to complete a course Zerbaxa.  Per the nursing facility, they could not get the Zerbaxa approved by her insurance and so transferred her back to this facility for treatment.  Upon arrival, rectal temp is low at 92.2 patient is hemodynamically stable with pulse ox 100% on the ventilator.  Mental status is reportedly at baseline.    The history is provided by the EMS personnel.     Review of patient's allergies indicates:  No Known Allergies  Past Medical History:   Diagnosis Date    Anoxic brain damage 07/2020    Bedbound 07/2020    Cardiac arrest 07/2020    Cirrhosis     GERD (gastroesophageal reflux disease)     Hemangioma of intra-abdominal structure     Hypertension     Nursing home resident     Protein calorie malnutrition     Pulmonary embolus     Respiratory distress     S/P percutaneous endoscopic gastrostomy (PEG) tube placement 07/2020    Total self-care deficit 07/2020    Tracheostomy dependence     7/2020     Past Surgical History:   Procedure Laterality Date    PEG W/TRACHEOSTOMY PLACEMENT  07/27/2020    SKIN GRAFT      buttocks     Family History   Problem Relation Age of Onset    Hypertension Mother     No Known Problems Father      Social History     Tobacco Use    Smoking status: Never    Smokeless tobacco: Never   Substance Use Topics    Alcohol use: Not Currently    Drug use: Not Currently     Review of Systems   Unable to perform ROS: Patient nonverbal       Physical Exam      Initial Vitals   BP Pulse Resp Temp SpO2   08/03/23 1244 08/03/23 1244 08/03/23 1244 08/03/23 1307 08/03/23 1244   (!) 105/55 60 (!) 22 (!) 92.2 °F (33.4 °C) 100 %      MAP       --                Physical Exam    Nursing note and vitals reviewed.  Constitutional: She appears well-developed. No distress.   Chronically ill-appearing   HENT:   Head: Atraumatic.   Eyes: Conjunctivae are normal.   Neck:   Tracheostomy in place   Cardiovascular:  Intact distal pulses.           Pulmonary/Chest: No respiratory distress.   Abdominal: She exhibits no distension.   Musculoskeletal:         General: No tenderness.     Neurological:   Nonverbal.  Withdraws to pain.   Skin: Skin is warm and dry.         ED Course   Procedures  Labs Reviewed   CBC W/ AUTO DIFFERENTIAL - Abnormal; Notable for the following components:       Result Value    RBC 3.13 (*)     Hemoglobin 8.3 (*)     Hematocrit 28.5 (*)     MCH 26.5 (*)     MCHC 29.1 (*)     RDW 21.0 (*)     Lymph # 0.9 (*)     Lymph % 16.6 (*)     All other components within normal limits   COMPREHENSIVE METABOLIC PANEL - Abnormal; Notable for the following components:    Creatinine <0.3 (*)     Total Protein 9.1 (*)     Albumin 3.0 (*)     Anion Gap 4 (*)     All other components within normal limits   CULTURE, RESPIRATORY   LACTIC ACID, PLASMA   PROCALCITONIN          Imaging Results              X-Ray Chest AP Portable (Final result)  Result time 08/03/23 14:09:53      Final result by Neville Wilson MD (08/03/23 14:09:53)                   Narrative:    Chest single view    CLINICAL DATA: Pneumonia    FINDINGS: AP view is compared to August 1    Heart size is normal. Mediastinum is unremarkable. Tracheostomy tube tip is at the level of the aortic knob.    Bilateral interstitial opacities are stable. Patchy alveolar opacity in the upper lung zone on the right is slightly increased. No effusions are identified. There is no pneumothorax.    Bilateral glenohumeral  malalignment is unchanged.    IMPRESSION:  1. Slightly increasing patchy airspace disease in the right upper lobe. No other interval changes compared to August 1.    Electronically signed by:  Neville Wilson MD  8/3/2023 2:09 PM CDT Workstation: [id number]                                     Medications   acetaminophen tablet 650 mg (has no administration in time range)   albuterol-ipratropium 2.5 mg-0.5 mg/3 mL nebulizer solution 3 mL (has no administration in time range)   metoprolol tartrate (LOPRESSOR) tablet 25 mg (has no administration in time range)   midodrine tablet 5 mg (has no administration in time range)   sodium chloride 0.9% flush 10 mL (has no administration in time range)   ondansetron injection 4 mg (has no administration in time range)   famotidine (PF) injection 20 mg (has no administration in time range)   ceftolozane-tazobactam (ZERBAXA) 1,500 mg in dextrose 5 % (D5W) 100 mL (has no administration in time range)   mupirocin 2 % ointment (has no administration in time range)   ceftolozane-tazobactam (ZERBAXA) 1,500 mg in dextrose 5 % (D5W) 100 mL (0 mg Intravenous Stopped 8/3/23 1730)     Medical Decision Making:   Initial Assessment:   Patient is hemodynamically stable.  She is hypothermic and bear hugger was placed.  CXR shows worsening pneumonia.  Blood counts and lactic acid unremarkable.  I have ordered the Zerbaxa.  It appears there are no alternative options.  We will require admission for this IV antibiotic.  Hospitalist has been consulted for admission.                          Clinical Impression:   Final diagnoses:  [J18.9] Pneumonia        ED Disposition Condition    Admit                 Tramaine Bowen MD  08/03/23 2178

## 2023-08-03 NOTE — ASSESSMENT & PLAN NOTE
Body mass index is 41.79 kg/m². Morbid obesity complicates all aspects of disease management from diagnostic modalities to treatment.

## 2023-08-03 NOTE — H&P
[facility] - Emergency Dept  Hospital Medicine  History & Physical    Patient Name: Genna Felix  MRN: [id number]  Patient Class: IP- Inpatient  Admission Date: 8/3/2023  Attending Physician: Kirill Garcia MD  Primary Care Provider: Nikky Primary Doctor         Patient information was obtained from past medical records and ER records.     Subjective:     Principal Problem:<principal problem not specified>    Chief Complaint:   Chief Complaint   Patient presents with    OTHER     Vent pt from [place] recently d/c  8/2/23 with Rx for ceftolozane-tazobactum 1.5gm [id number] x 4 days. [facility]  unable to get insurance to approve meds. Sent pt back to be readmitted for the rest of treatment.         HPI: Genna Felix is a 62 year old female with a past medical history of anoxic brain injury with quadriplegia, tracheostomy and G tube, PSVT, obesity, stage two sacral wound, HTN, anemia and right shoulder dislocation with recent admission for acute on chronic respiratory failure with CRP Pseudomonas VAP requiring new mechanical ventilation who presents back from [place] as the patient has apparently been unable to complete Zerbaxa therapy as this antibiotic is not available at that facility. She will be admitted to the ICU and complete four more days of Zerbaxa per prior ID recommendations.      Past Medical History:   Diagnosis Date    Anoxic brain damage 07/2020    Bedbound 07/2020    Cardiac arrest 07/2020    Cirrhosis     GERD (gastroesophageal reflux disease)     Hemangioma of intra-abdominal structure     Hypertension     Nursing home resident     Protein calorie malnutrition     Pulmonary embolus     Respiratory distress     S/P percutaneous endoscopic gastrostomy (PEG) tube placement 07/2020    Total self-care deficit 07/2020    Tracheostomy dependence     7/2020       Past Surgical History:   Procedure Laterality Date    PEG W/TRACHEOSTOMY PLACEMENT  07/27/2020    SKIN GRAFT       buttocks       Review of patient's allergies indicates:  No Known Allergies    No current facility-administered medications on file prior to encounter.     Current Outpatient Medications on File Prior to Encounter   Medication Sig    acetaminophen (TYLENOL) 325 MG tablet 2 tablets (650 mg total) by Per G Tube route every 4 (four) hours as needed for Pain or Temperature greater than (100.4F).    acetylcysteine 100 mg/ml, 10%, (MUCOMYST) 100 mg/mL (10 %) nebulizer solution Take 4 mLs by nebulization every 8 (eight) hours.    albuterol-ipratropium (DUO-NEB) 2.5 mg-0.5 mg/3 mL nebulizer solution Take 3 mLs by nebulization every 6 (six) hours. Rescue    arginine/glutamine/calcium bmb (XIOMARA ORAL) 1 Package by PEG Tube route 2 (two) times a day. In 8oz of water    cycloSPORINE (RESTASIS) 0.05 % ophthalmic emulsion 1 drop 2 (two) times daily. 0900  2100    dextran 70-hypromellose (ARTIFICIAL TEARS,[id number],) ophthalmic solution Place 1 drop into both eyes 4 (four) times daily as needed.    dextrose 5 % (D5W) SolP 100 mL with ceftolozane-tazobactam 1.5 gram SolR 3,000 mg injection Inject 3,000 mg into the vein every 8 (eight) hours. for 4 days    ergocalciferol (ERGOCALCIFEROL) 50,000 unit Cap Take 50,000 Units by mouth every Saturday.    ipratropium (ATROVENT HFA) 17 mcg/actuation inhaler Inhale 2 puffs 4 times a day by inhalation route as needed.    lactose-reduced food (ISOSOURCE ORAL) 1.5 mg by Per G Tube route continuous. 1.5 per peg tube at 50ml/hr continuously    linaCLOtide (LINZESS) 145 mcg Cap capsule Take 145 mcg by mouth before breakfast.    metoclopramide HCl (REGLAN) 10 MG tablet Take 1 tablet 3 times a day by oral route.    metoprolol tartrate (LOPRESSOR) 50 MG tablet 0.5 tablets (25 mg total) by Per G Tube route 2 (two) times daily.    miconazole (MICOTIN) 2 % cream Apply topically 2 (two) times daily. Reason:  Tinea pedis for 14 days    midodrine (PROAMATINE) 5 MG Tab Take 1 tablet 3  times a day by oral route.    ondansetron (ZOFRAN) 4 MG tablet 4 mg by Per G Tube route every 8 (eight) hours as needed for Nausea.    ondansetron (ZOFRAN-ODT) 4 MG TbDL Take by mouth.    polyethylene glycol (GLYCOLAX) 17 gram PwPk Take 17 g by mouth daily as needed.    polyvinyl alcohol-povidone (CLEAR EYES NATURAL TEARS) 0.5-0.6 % Drop Apply 1 drop to eye daily as needed.    promethazine (PHENERGAN) 12.5 MG Tab Take 1 tablet 4 times a day by oral route as needed.    scopolamine (TRANSDERM-SCOP) 1.3-1.5 mg (1 mg over 3 days) scopolamine 1 mg over 3 days transdermal patch   APPLY 1 PATCH BY TRANSDERMAL ROUTE TO THE HAIRLESS AREA BEHIND 1 EAR AT LEAST 4 HR BEFORE EFFECT IS REQUIRED; REAPPLY EVERY 3 DAYS AS NEEDED    [DISCONTINUED] clonazePAM (KLONOPIN) 0.5 MG tablet Take 0.5 mg by mouth 2 (two) times daily.    [DISCONTINUED] oxybutynin (DITROPAN) 5 MG Tab Take 5 mg by mouth 2 (two) times daily.     Family History       Problem Relation (Age of Onset)    Hypertension Mother    No Known Problems Father          Tobacco Use    Smoking status: Never    Smokeless tobacco: Never   Substance and Sexual Activity    Alcohol use: Not Currently    Drug use: Not Currently    Sexual activity: Not Currently     Review of Systems   Unable to perform ROS: Patient nonverbal     Objective:     Vital Signs (Most Recent):  Temp: (!) 92.2 °F (33.4 °C) (08/03/23 1307)  Pulse: 60 (08/03/23 1244)  Resp: (!) 22 (08/03/23 1244)  BP: (!) 105/55 (08/03/23 1244)  SpO2: 100 % (08/03/23 1244) Vital Signs (24h Range):  Temp:  [92.2 °F (33.4 °C)] 92.2 °F (33.4 °C)  Pulse:  [60] 60  Resp:  [22] 22  SpO2:  [100 %] 100 %  BP: (105)/(55) 105/55     Weight: 97.1 kg (214 lb)  Body mass index is 41.79 kg/m².     Physical Exam  Vitals and nursing note reviewed.   Constitutional:       General: She is not in acute distress.     Appearance: She is obese. She is ill-appearing.   HENT:      Head: Normocephalic and atraumatic.      Right Ear:  External ear normal.      Nose: Nose normal.      Mouth/Throat:      Mouth: Mucous membranes are moist.      Pharynx: Oropharynx is clear.   Eyes:      Conjunctiva/sclera: Conjunctivae normal.   Neck:      Comments: Tracheostomy.  Cardiovascular:      Rate and Rhythm: Normal rate and regular rhythm.      Pulses: Normal pulses.      Heart sounds: Normal heart sounds.   Pulmonary:      Effort: No respiratory distress.      Breath sounds: Normal breath sounds.   Abdominal:      General: There is distension.      Tenderness: There is no abdominal tenderness.      Comments: G tube.   Genitourinary:     Comments: Hamilton.  Musculoskeletal:      Cervical back: Neck supple.      Right lower leg: No edema.      Left lower leg: No edema.   Skin:     General: Skin is warm and dry.   Neurological:      Mental Status: Mental status is at baseline.                Significant Labs: All pertinent labs within the past 24 hours have been reviewed.    Significant Imaging: I have reviewed all pertinent imaging results/findings within the past 24 hours.    Assessment/Plan:     Chronic respiratory failure  -Continue home ventilator settings    Paroxysmal SVT (supraventricular tachycardia)  -Metoprolol  -Telemetry      Hepatic steatosis  -Trend LFTs      Obesity (BMI 30-39.9)  Body mass index is 41.79 kg/m². Morbid obesity complicates all aspects of disease management from diagnostic modalities to treatment.         Pressure injury of buttock, stage 2  -Wound Care      Pneumonia of both lungs due to infectious organism  -Zerbaxa for five days total      Multiple drug resistant organism (MDRO) culture positive  -Zerbaxa      On tube feeding diet  -Nutrition consulted      Difficult Hamilton catheter placement  -Hamilton in place      Essential hypertension  -Continue to monitor    PEG (percutaneous endoscopic gastrostomy) status  -Care per Nursing      Tracheostomy dependence  -Care per RT      Dislocation of right shoulder joint  -Ortho  consulted    Anemia of chronic disease  -Trend Hgb with CBC        VTE Risk Mitigation (From admission, onward)    None                     Kirill Garcia MD  Department of Hospital Medicine  Formerly [facility] - Emergency Dept

## 2023-08-03 NOTE — CONSULTS
Chart and x-rays reviewed.    Patient has chronic dislocation of the right shoulder that is at least a year old comparing todays films to x-rays dated 08/10/2022.    Not a surgical candidate for reverse shoulder at this time.    Reconsult PRN

## 2023-08-03 NOTE — PHARMACY MED REC
"              .  Admission Medication History     The home medication history was taken by Shonda Fountain.    You may go to "Admission" then "Reconcile Home Medications" tabs to review and/or act upon these items.     The home medication list has been updated by the Pharmacy department.   Please read ALL comments highlighted in yellow.   Please address this information as you see fit.    Feel free to contact us if you have any questions or require assistance.          Medications listed below were obtained from: [employer]  No current facility-administered medications on file prior to encounter.     Current Outpatient Medications on File Prior to Encounter   Medication Sig Dispense Refill    acetaminophen (TYLENOL) 325 MG tablet 2 tablets (650 mg total) by Per G Tube route every 4 (four) hours as needed for Pain or Temperature greater than (100.4F).  0    acetylcysteine 100 mg/ml, 10%, (MUCOMYST) 100 mg/mL (10 %) nebulizer solution Take 4 mLs by nebulization every 8 (eight) hours.  12    albuterol-ipratropium (DUO-NEB) 2.5 mg-0.5 mg/3 mL nebulizer solution Take 3 mLs by nebulization every 6 (six) hours. Rescue 1 Box 0    arginine/glutamine/calcium bmb (XIOMARA ORAL) 1 Package by PEG Tube route 2 (two) times a day. In 8oz of water      cycloSPORINE (RESTASIS) 0.05 % ophthalmic emulsion 1 drop 2 (two) times daily. 0900  2100      dextran 70-hypromellose (ARTIFICIAL TEARS,[id number],) ophthalmic solution Place 1 drop into both eyes 4 (four) times daily as needed.      dextrose 5 % (D5W) SolP 100 mL with ceftolozane-tazobactam 1.5 gram SolR 3,000 mg injection Inject 3,000 mg into the vein every 8 (eight) hours. for 4 days      ergocalciferol (ERGOCALCIFEROL) 50,000 unit Cap Take 50,000 Units by mouth every Saturday.      ipratropium (ATROVENT HFA) 17 mcg/actuation inhaler Inhale 2 puffs 4 times a day by inhalation route as needed.      lactose-reduced food (ISOSOURCE ORAL) 1.5 mg by Per G Tube route " continuous. 1.5 per peg tube at 50ml/hr continuously      linaCLOtide (LINZESS) 145 mcg Cap capsule Take 145 mcg by mouth before breakfast.      metoclopramide HCl (REGLAN) 10 MG tablet Take 1 tablet 3 times a day by oral route.      metoprolol tartrate (LOPRESSOR) 50 MG tablet 0.5 tablets (25 mg total) by Per G Tube route 2 (two) times daily. 30 tablet 11    miconazole (MICOTIN) 2 % cream Apply topically 2 (two) times daily. Reason:  Tinea pedis for 14 days  0    midodrine (PROAMATINE) 5 MG Tab Take 1 tablet 3 times a day by oral route.      ondansetron (ZOFRAN) 4 MG tablet 4 mg by Per G Tube route every 8 (eight) hours as needed for Nausea.      ondansetron (ZOFRAN-ODT) 4 MG TbDL Take by mouth.      polyethylene glycol (GLYCOLAX) 17 gram PwPk Take 17 g by mouth daily as needed.      polyvinyl alcohol-povidone (CLEAR EYES NATURAL TEARS) 0.5-0.6 % Drop Apply 1 drop to eye daily as needed.      promethazine (PHENERGAN) 12.5 MG Tab Take 1 tablet 4 times a day by oral route as needed.      scopolamine (TRANSDERM-SCOP) 1.3-1.5 mg (1 mg over 3 days) scopolamine 1 mg over 3 days transdermal patch   APPLY 1 PATCH BY TRANSDERMAL ROUTE TO THE HAIRLESS AREA BEHIND 1 EAR AT LEAST 4 HR BEFORE EFFECT IS REQUIRED; REAPPLY EVERY 3 DAYS AS NEEDED      [DISCONTINUED] clonazePAM (KLONOPIN) 0.5 MG tablet Take 0.5 mg by mouth 2 (two) times daily.      [DISCONTINUED] oxybutynin (DITROPAN) 5 MG Tab Take 5 mg by mouth 2 (two) times daily.             Shonda Fountain  [id number]

## 2023-08-03 NOTE — CARE UPDATE
08/03/23 1221   PRE-TX-O2   Oxygen Concentration (%) 40   Adult Surgical Airway 07/27/23 1814 Shiley 8.0 mm DIC cuffed Extra Large Cuffed Distal 8.0 / 85 mm 8.0   Placement Date/Time: 07/27/23 1814   Present Prior to Hospital Arrival?: No  Inserted by: MD  Placed By: ICU physician  Type: Tracheostomy  Brand: Shiley  Airway Device Size and Style: 8.0 mm DIC cuffed  Airway Device Style: Extra Large Cuffed Distal ...   Cuff Inflation? Inflated   Site Assessment Drainage   Site Care Cleansed;Dried;Dressing applied   Vent Select   Charged w/in last 24h YES   Preset Conventional Ventilator Settings   Ventilation Type VC   Vent Mode A/C   Set Rate 24 BPM   Vt Set 365 mL   PEEP/CPAP 8 cmH20   Pressure Support 0 cmH20   Waveform RAMP   Peak Flow 60 L/min   Plateau Set/Insp. Hold (sec) 0   Trigger Sensitivity Flow/I-Trigger 3 L/min   Patient Ventilator Parameters   Resp Rate Total 30 br/min   Peak Airway Pressure 37 cmH20   Mean Airway Pressure 17 cmH20   Plateau Pressure 18 cmH20   Exhaled Vt 380 mL   Total Ve 11.7 L/m   I:E Ratio Measured 1:2.00   Conventional Ventilator Alarms   Resp Rate High Alarm 0 br/min   Press High Alarm 40 cmH2O   Apnea Rate 10   Apnea Volume (mL) 0 mL   Apnea Oxygen Concentration  100   Apnea Flow Rate (L/min) 44   T Apnea 20 sec(s)   Ready to Wean/Extubation Screen   FIO2<=50 (chart decimal) 0.4   MV<16L (chart vol.) 11.7   PEEP <=8 (chart #) 8

## 2023-08-03 NOTE — SUBJECTIVE & OBJECTIVE
Past Medical History:   Diagnosis Date    Anoxic brain damage 07/2020    Bedbound 07/2020    Cardiac arrest 07/2020    Cirrhosis     GERD (gastroesophageal reflux disease)     Hemangioma of intra-abdominal structure     Hypertension     Nursing home resident     Protein calorie malnutrition     Pulmonary embolus     Respiratory distress     S/P percutaneous endoscopic gastrostomy (PEG) tube placement 07/2020    Total self-care deficit 07/2020    Tracheostomy dependence     7/2020       Past Surgical History:   Procedure Laterality Date    PEG W/TRACHEOSTOMY PLACEMENT  07/27/2020    SKIN GRAFT      buttocks       Review of patient's allergies indicates:  No Known Allergies    No current facility-administered medications on file prior to encounter.     Current Outpatient Medications on File Prior to Encounter   Medication Sig    acetaminophen (TYLENOL) 325 MG tablet 2 tablets (650 mg total) by Per G Tube route every 4 (four) hours as needed for Pain or Temperature greater than (100.4F).    acetylcysteine 100 mg/ml, 10%, (MUCOMYST) 100 mg/mL (10 %) nebulizer solution Take 4 mLs by nebulization every 8 (eight) hours.    albuterol-ipratropium (DUO-NEB) 2.5 mg-0.5 mg/3 mL nebulizer solution Take 3 mLs by nebulization every 6 (six) hours. Rescue    arginine/glutamine/calcium bmb (XIOMARA ORAL) 1 Package by PEG Tube route 2 (two) times a day. In 8oz of water    cycloSPORINE (RESTASIS) 0.05 % ophthalmic emulsion 1 drop 2 (two) times daily. 0900  2100    dextran 70-hypromellose (ARTIFICIAL TEARS,[id number],) ophthalmic solution Place 1 drop into both eyes 4 (four) times daily as needed.    dextrose 5 % (D5W) SolP 100 mL with ceftolozane-tazobactam 1.5 gram SolR 3,000 mg injection Inject 3,000 mg into the vein every 8 (eight) hours. for 4 days    ergocalciferol (ERGOCALCIFEROL) 50,000 unit Cap Take 50,000 Units by mouth every Saturday.    ipratropium (ATROVENT HFA) 17 mcg/actuation inhaler Inhale 2 puffs 4 times a day by  inhalation route as needed.    lactose-reduced food (ISOSOURCE ORAL) 1.5 mg by Per G Tube route continuous. 1.5 per peg tube at 50ml/hr continuously    linaCLOtide (LINZESS) 145 mcg Cap capsule Take 145 mcg by mouth before breakfast.    metoclopramide HCl (REGLAN) 10 MG tablet Take 1 tablet 3 times a day by oral route.    metoprolol tartrate (LOPRESSOR) 50 MG tablet 0.5 tablets (25 mg total) by Per G Tube route 2 (two) times daily.    miconazole (MICOTIN) 2 % cream Apply topically 2 (two) times daily. Reason:  Tinea pedis for 14 days    midodrine (PROAMATINE) 5 MG Tab Take 1 tablet 3 times a day by oral route.    ondansetron (ZOFRAN) 4 MG tablet 4 mg by Per G Tube route every 8 (eight) hours as needed for Nausea.    ondansetron (ZOFRAN-ODT) 4 MG TbDL Take by mouth.    polyethylene glycol (GLYCOLAX) 17 gram PwPk Take 17 g by mouth daily as needed.    polyvinyl alcohol-povidone (CLEAR EYES NATURAL TEARS) 0.5-0.6 % Drop Apply 1 drop to eye daily as needed.    promethazine (PHENERGAN) 12.5 MG Tab Take 1 tablet 4 times a day by oral route as needed.    scopolamine (TRANSDERM-SCOP) 1.3-1.5 mg (1 mg over 3 days) scopolamine 1 mg over 3 days transdermal patch   APPLY 1 PATCH BY TRANSDERMAL ROUTE TO THE HAIRLESS AREA BEHIND 1 EAR AT LEAST 4 HR BEFORE EFFECT IS REQUIRED; REAPPLY EVERY 3 DAYS AS NEEDED    [DISCONTINUED] clonazePAM (KLONOPIN) 0.5 MG tablet Take 0.5 mg by mouth 2 (two) times daily.    [DISCONTINUED] oxybutynin (DITROPAN) 5 MG Tab Take 5 mg by mouth 2 (two) times daily.     Family History       Problem Relation (Age of Onset)    Hypertension Mother    No Known Problems Father          Tobacco Use    Smoking status: Never    Smokeless tobacco: Never   Substance and Sexual Activity    Alcohol use: Not Currently    Drug use: Not Currently    Sexual activity: Not Currently     Review of Systems   Unable to perform ROS: Patient nonverbal     Objective:     Vital Signs (Most Recent):  Temp: (!) 92.2 °F (33.4 °C)  (08/03/23 1307)  Pulse: 60 (08/03/23 1244)  Resp: (!) 22 (08/03/23 1244)  BP: (!) 105/55 (08/03/23 1244)  SpO2: 100 % (08/03/23 1244) Vital Signs (24h Range):  Temp:  [92.2 °F (33.4 °C)] 92.2 °F (33.4 °C)  Pulse:  [60] 60  Resp:  [22] 22  SpO2:  [100 %] 100 %  BP: (105)/(55) 105/55     Weight: 97.1 kg (214 lb)  Body mass index is 41.79 kg/m².     Physical Exam  Vitals and nursing note reviewed.   Constitutional:       General: She is not in acute distress.     Appearance: She is obese. She is ill-appearing.   HENT:      Head: Normocephalic and atraumatic.      Right Ear: External ear normal.      Nose: Nose normal.      Mouth/Throat:      Mouth: Mucous membranes are moist.      Pharynx: Oropharynx is clear.   Eyes:      Conjunctiva/sclera: Conjunctivae normal.   Neck:      Comments: Tracheostomy.  Cardiovascular:      Rate and Rhythm: Normal rate and regular rhythm.      Pulses: Normal pulses.      Heart sounds: Normal heart sounds.   Pulmonary:      Effort: No respiratory distress.      Breath sounds: Normal breath sounds.   Abdominal:      General: There is distension.      Tenderness: There is no abdominal tenderness.      Comments: G tube.   Genitourinary:     Comments: Hamilton.  Musculoskeletal:      Cervical back: Neck supple.      Right lower leg: No edema.      Left lower leg: No edema.   Skin:     General: Skin is warm and dry.   Neurological:      Mental Status: Mental status is at baseline.                Significant Labs: All pertinent labs within the past 24 hours have been reviewed.    Significant Imaging: I have reviewed all pertinent imaging results/findings within the past 24 hours.

## 2023-08-03 NOTE — ED NOTES
Vent pt in from [place] after being d/c on 8/1. Pt was sent home with IV abx, ceftolozane-tazobactium, to take q 8 hours for 4 days. Facility was unable to get meds approved. Pt send back to receive the rest of the abx inpatient. Pt in no acute distress. Rectal temp 92.2 pt placed on ishan hugger.

## 2023-08-03 NOTE — HPI
Genna Felix is a 62 year old female with a past medical history of anoxic brain injury with quadriplegia, tracheostomy and G tube, PSVT, obesity, stage two sacral wound, HTN, anemia and right shoulder dislocation with recent admission for acute on chronic respiratory failure with CRP Pseudomonas VAP requiring new mechanical ventilation who presents back from [place] as the patient has apparently been unable to complete Zerbaxa therapy as this antibiotic is not available at that facility. She will be admitted to the ICU and complete four more days of Zerbaxa per prior ID recommendations.

## 2023-08-04 LAB
ALBUMIN SERPL BCP-MCNC: 3.2 G/DL (ref 3.5–5.2)
ALP SERPL-CCNC: 128 U/L (ref 55–135)
ALT SERPL W/O P-5'-P-CCNC: 24 U/L (ref 10–44)
ANION GAP SERPL CALC-SCNC: 8 MMOL/L (ref 8–16)
AST SERPL-CCNC: 22 U/L (ref 10–40)
BASOPHILS # BLD AUTO: 0.01 K/UL (ref 0–0.2)
BASOPHILS NFR BLD: 0.2 % (ref 0–1.9)
BILIRUB SERPL-MCNC: 0.7 MG/DL (ref 0.1–1)
BUN SERPL-MCNC: 19 MG/DL (ref 6–20)
CALCIUM SERPL-MCNC: 8.9 MG/DL (ref 8.7–10.5)
CHLORIDE SERPL-SCNC: 103 MMOL/L (ref 95–110)
CO2 SERPL-SCNC: 28 MMOL/L (ref 23–29)
CREAT SERPL-MCNC: <0.3 MG/DL (ref 0.5–1.4)
DIFFERENTIAL METHOD: ABNORMAL
EOSINOPHIL # BLD AUTO: 0.3 K/UL (ref 0–0.5)
EOSINOPHIL NFR BLD: 5.9 % (ref 0–8)
ERYTHROCYTE [DISTWIDTH] IN BLOOD BY AUTOMATED COUNT: 20.9 % (ref 11.5–14.5)
EST. GFR  (NO RACE VARIABLE): >60 ML/MIN/1.73 M^2
GLUCOSE SERPL-MCNC: 82 MG/DL (ref 70–110)
HCT VFR BLD AUTO: 28.6 % (ref 37–48.5)
HGB BLD-MCNC: 8.4 G/DL (ref 12–16)
IMM GRANULOCYTES # BLD AUTO: 0.06 K/UL (ref 0–0.04)
IMM GRANULOCYTES NFR BLD AUTO: 1.1 % (ref 0–0.5)
LYMPHOCYTES # BLD AUTO: 1.3 K/UL (ref 1–4.8)
LYMPHOCYTES NFR BLD: 23.4 % (ref 18–48)
MAGNESIUM SERPL-MCNC: 1.9 MG/DL (ref 1.6–2.6)
MCH RBC QN AUTO: 26.6 PG (ref 27–31)
MCHC RBC AUTO-ENTMCNC: 29.4 G/DL (ref 32–36)
MCV RBC AUTO: 91 FL (ref 82–98)
MONOCYTES # BLD AUTO: 0.5 K/UL (ref 0.3–1)
MONOCYTES NFR BLD: 9 % (ref 4–15)
NEUTROPHILS # BLD AUTO: 3.4 K/UL (ref 1.8–7.7)
NEUTROPHILS NFR BLD: 60.4 % (ref 38–73)
NRBC BLD-RTO: 1 /100 WBC
PHOSPHATE SERPL-MCNC: 3.4 MG/DL (ref 2.7–4.5)
PLATELET # BLD AUTO: 352 K/UL (ref 150–450)
PMV BLD AUTO: 11.2 FL (ref 9.2–12.9)
POTASSIUM SERPL-SCNC: 3.7 MMOL/L (ref 3.5–5.1)
PROT SERPL-MCNC: 8.9 G/DL (ref 6–8.4)
RBC # BLD AUTO: 3.16 M/UL (ref 4–5.4)
SODIUM SERPL-SCNC: 139 MMOL/L (ref 136–145)
WBC # BLD AUTO: 5.55 K/UL (ref 3.9–12.7)

## 2023-08-04 PROCEDURE — 99900026 HC AIRWAY MAINTENANCE (STAT)

## 2023-08-04 PROCEDURE — 94761 N-INVAS EAR/PLS OXIMETRY MLT: CPT

## 2023-08-04 PROCEDURE — 25000242 PHARM REV CODE 250 ALT 637 W/ HCPCS: Performed by: STUDENT IN AN ORGANIZED HEALTH CARE EDUCATION/TRAINING PROGRAM

## 2023-08-04 PROCEDURE — 94799 UNLISTED PULMONARY SVC/PX: CPT

## 2023-08-04 PROCEDURE — 94003 VENT MGMT INPAT SUBQ DAY: CPT

## 2023-08-04 PROCEDURE — 36415 COLL VENOUS BLD VENIPUNCTURE: CPT | Performed by: STUDENT IN AN ORGANIZED HEALTH CARE EDUCATION/TRAINING PROGRAM

## 2023-08-04 PROCEDURE — 84100 ASSAY OF PHOSPHORUS: CPT | Performed by: STUDENT IN AN ORGANIZED HEALTH CARE EDUCATION/TRAINING PROGRAM

## 2023-08-04 PROCEDURE — 27100171 HC OXYGEN HIGH FLOW UP TO 24 HOURS

## 2023-08-04 PROCEDURE — 20000000 HC ICU ROOM

## 2023-08-04 PROCEDURE — 94640 AIRWAY INHALATION TREATMENT: CPT

## 2023-08-04 PROCEDURE — 63600175 PHARM REV CODE 636 W HCPCS: Mod: JZ,JG | Performed by: STUDENT IN AN ORGANIZED HEALTH CARE EDUCATION/TRAINING PROGRAM

## 2023-08-04 PROCEDURE — 99900035 HC TECH TIME PER 15 MIN (STAT)

## 2023-08-04 PROCEDURE — 99900031 HC PATIENT EDUCATION (STAT)

## 2023-08-04 PROCEDURE — 85025 COMPLETE CBC W/AUTO DIFF WBC: CPT | Performed by: STUDENT IN AN ORGANIZED HEALTH CARE EDUCATION/TRAINING PROGRAM

## 2023-08-04 PROCEDURE — 25000003 PHARM REV CODE 250: Performed by: STUDENT IN AN ORGANIZED HEALTH CARE EDUCATION/TRAINING PROGRAM

## 2023-08-04 PROCEDURE — 80053 COMPREHEN METABOLIC PANEL: CPT | Performed by: STUDENT IN AN ORGANIZED HEALTH CARE EDUCATION/TRAINING PROGRAM

## 2023-08-04 PROCEDURE — 83735 ASSAY OF MAGNESIUM: CPT | Performed by: STUDENT IN AN ORGANIZED HEALTH CARE EDUCATION/TRAINING PROGRAM

## 2023-08-04 RX ORDER — ERGOCALCIFEROL (VITAMIN D2) 200 MCG/ML
4000 DROPS ORAL DAILY
Status: DISCONTINUED | OUTPATIENT
Start: 2023-08-04 | End: 2023-08-08 | Stop reason: HOSPADM

## 2023-08-04 RX ORDER — LEVALBUTEROL INHALATION SOLUTION 1.25 MG/3ML
1.25 SOLUTION RESPIRATORY (INHALATION) EVERY 8 HOURS
Status: DISCONTINUED | OUTPATIENT
Start: 2023-08-04 | End: 2023-08-08 | Stop reason: HOSPADM

## 2023-08-04 RX ORDER — LANOLIN ALCOHOL/MO/W.PET/CERES
800 CREAM (GRAM) TOPICAL
Status: DISCONTINUED | OUTPATIENT
Start: 2023-08-04 | End: 2023-08-08 | Stop reason: HOSPADM

## 2023-08-04 RX ORDER — CALCIUM GLUCONATE 20 MG/ML
3 INJECTION, SOLUTION INTRAVENOUS
Status: DISCONTINUED | OUTPATIENT
Start: 2023-08-04 | End: 2023-08-08 | Stop reason: HOSPADM

## 2023-08-04 RX ORDER — POLYETHYLENE GLYCOL 3350 17 G/17G
17 POWDER, FOR SOLUTION ORAL DAILY PRN
Status: DISCONTINUED | OUTPATIENT
Start: 2023-08-04 | End: 2023-08-08 | Stop reason: HOSPADM

## 2023-08-04 RX ORDER — MAGNESIUM SULFATE HEPTAHYDRATE 40 MG/ML
2 INJECTION, SOLUTION INTRAVENOUS
Status: DISCONTINUED | OUTPATIENT
Start: 2023-08-04 | End: 2023-08-08 | Stop reason: HOSPADM

## 2023-08-04 RX ORDER — POTASSIUM CHLORIDE 7.45 MG/ML
80 INJECTION INTRAVENOUS
Status: DISCONTINUED | OUTPATIENT
Start: 2023-08-04 | End: 2023-08-08 | Stop reason: HOSPADM

## 2023-08-04 RX ORDER — CARBOXYMETHYLCELLULOSE SODIUM 5 MG/ML
1 SOLUTION/ DROPS OPHTHALMIC 4 TIMES DAILY
Status: DISCONTINUED | OUTPATIENT
Start: 2023-08-04 | End: 2023-08-08 | Stop reason: HOSPADM

## 2023-08-04 RX ORDER — SODIUM,POTASSIUM PHOSPHATES 280-250MG
2 POWDER IN PACKET (EA) ORAL
Status: DISCONTINUED | OUTPATIENT
Start: 2023-08-04 | End: 2023-08-08 | Stop reason: HOSPADM

## 2023-08-04 RX ORDER — CALCIUM GLUCONATE 20 MG/ML
1 INJECTION, SOLUTION INTRAVENOUS
Status: DISCONTINUED | OUTPATIENT
Start: 2023-08-04 | End: 2023-08-08 | Stop reason: HOSPADM

## 2023-08-04 RX ORDER — ONDANSETRON 4 MG/1
4 TABLET, ORALLY DISINTEGRATING ORAL EVERY 8 HOURS PRN
Status: DISCONTINUED | OUTPATIENT
Start: 2023-08-04 | End: 2023-08-08 | Stop reason: HOSPADM

## 2023-08-04 RX ORDER — DOXYLAMINE SUCCINATE 25 MG
TABLET ORAL 2 TIMES DAILY
Status: DISCONTINUED | OUTPATIENT
Start: 2023-08-04 | End: 2023-08-08 | Stop reason: HOSPADM

## 2023-08-04 RX ORDER — POTASSIUM CHLORIDE 7.45 MG/ML
60 INJECTION INTRAVENOUS
Status: DISCONTINUED | OUTPATIENT
Start: 2023-08-04 | End: 2023-08-08 | Stop reason: HOSPADM

## 2023-08-04 RX ORDER — POTASSIUM CHLORIDE 7.45 MG/ML
40 INJECTION INTRAVENOUS
Status: DISCONTINUED | OUTPATIENT
Start: 2023-08-04 | End: 2023-08-08 | Stop reason: HOSPADM

## 2023-08-04 RX ORDER — CALCIUM GLUCONATE 20 MG/ML
2 INJECTION, SOLUTION INTRAVENOUS
Status: DISCONTINUED | OUTPATIENT
Start: 2023-08-04 | End: 2023-08-08 | Stop reason: HOSPADM

## 2023-08-04 RX ORDER — SODIUM CHLORIDE 0.9 % (FLUSH) 0.9 %
10 SYRINGE (ML) INJECTION
Status: DISCONTINUED | OUTPATIENT
Start: 2023-08-04 | End: 2023-08-08 | Stop reason: HOSPADM

## 2023-08-04 RX ORDER — ENOXAPARIN SODIUM 100 MG/ML
40 INJECTION SUBCUTANEOUS EVERY 24 HOURS
Status: DISCONTINUED | OUTPATIENT
Start: 2023-08-04 | End: 2023-08-08 | Stop reason: HOSPADM

## 2023-08-04 RX ORDER — MAGNESIUM SULFATE HEPTAHYDRATE 40 MG/ML
4 INJECTION, SOLUTION INTRAVENOUS
Status: DISCONTINUED | OUTPATIENT
Start: 2023-08-04 | End: 2023-08-08 | Stop reason: HOSPADM

## 2023-08-04 RX ORDER — MIDODRINE HYDROCHLORIDE 2.5 MG/1
10 TABLET ORAL
Status: DISCONTINUED | OUTPATIENT
Start: 2023-08-04 | End: 2023-08-04

## 2023-08-04 RX ORDER — METOPROLOL TARTRATE 50 MG/1
50 TABLET ORAL EVERY 6 HOURS
Status: DISCONTINUED | OUTPATIENT
Start: 2023-08-04 | End: 2023-08-04

## 2023-08-04 RX ADMIN — LEVALBUTEROL HYDROCHLORIDE 1.25 MG: 1.25 SOLUTION RESPIRATORY (INHALATION) at 03:08

## 2023-08-04 RX ADMIN — CEFTOLOZANE AND TAZOBACTAM 1500 MG: 1; .5 INJECTION, POWDER, LYOPHILIZED, FOR SOLUTION INTRAVENOUS at 05:08

## 2023-08-04 RX ADMIN — CARBOXYMETHYLCELLULOSE SODIUM 1 DROP: 5 SOLUTION/ DROPS OPHTHALMIC at 05:08

## 2023-08-04 RX ADMIN — CARBOXYMETHYLCELLULOSE SODIUM 1 DROP: 5 SOLUTION/ DROPS OPHTHALMIC at 09:08

## 2023-08-04 RX ADMIN — MUPIROCIN 1 G: 20 OINTMENT TOPICAL at 12:08

## 2023-08-04 RX ADMIN — MIDODRINE HYDROCHLORIDE 5 MG: 2.5 TABLET ORAL at 05:08

## 2023-08-04 RX ADMIN — MIDODRINE HYDROCHLORIDE 5 MG: 2.5 TABLET ORAL at 09:08

## 2023-08-04 RX ADMIN — METOPROLOL TARTRATE 25 MG: 25 TABLET, FILM COATED ORAL at 09:08

## 2023-08-04 RX ADMIN — Medication 4000 UNITS: at 12:08

## 2023-08-04 RX ADMIN — FAMOTIDINE 20 MG: 10 INJECTION INTRAVENOUS at 09:08

## 2023-08-04 RX ADMIN — ACETAMINOPHEN 650 MG: 325 TABLET ORAL at 05:08

## 2023-08-04 RX ADMIN — CEFTOLOZANE AND TAZOBACTAM 3000 MG: 1; .5 INJECTION, POWDER, LYOPHILIZED, FOR SOLUTION INTRAVENOUS at 12:08

## 2023-08-04 RX ADMIN — MICONAZOLE NITRATE: 20 CREAM TOPICAL at 09:08

## 2023-08-04 RX ADMIN — POTASSIUM BICARBONATE 50 MEQ: 977.5 TABLET, EFFERVESCENT ORAL at 05:08

## 2023-08-04 RX ADMIN — CARBOXYMETHYLCELLULOSE SODIUM 1 DROP: 5 SOLUTION/ DROPS OPHTHALMIC at 12:08

## 2023-08-04 RX ADMIN — ENOXAPARIN SODIUM 40 MG: 100 INJECTION SUBCUTANEOUS at 05:08

## 2023-08-04 RX ADMIN — CEFTOLOZANE AND TAZOBACTAM 3000 MG: 1; .5 INJECTION, POWDER, LYOPHILIZED, FOR SOLUTION INTRAVENOUS at 07:08

## 2023-08-04 RX ADMIN — MICONAZOLE NITRATE: 20 CREAM TOPICAL at 12:08

## 2023-08-04 RX ADMIN — MUPIROCIN 1 G: 20 OINTMENT TOPICAL at 09:08

## 2023-08-04 NOTE — CARE UPDATE
08/04/23 1700   Patient Assessment/Suction   Level of Consciousness (AVPU) responds to pain   Respiratory Effort Normal;Unlabored   Expansion/Accessory Muscles/Retractions no retractions;no use of accessory muscles   All Lung Fields Breath Sounds diminished   Rhythm/Pattern, Respiratory assisted mechanically   Cough Frequency with stimulation   Cough Type assisted   Suction Method tracheal;oral   Suction Pressure (mmHg) -120 mmHg   $ Suction Charges Inline Suction Procedure Stat Charge   Secretions Amount moderate   Secretions Color white   Secretions Characteristics thick   PRE-TX-O2   Device (Oxygen Therapy) ventilator   $ Is the patient on Low Flow Oxygen? Yes   Pulse Oximetry Type Continuous   $ Pulse Oximetry - Multiple Charge Pulse Oximetry - Multiple   Adult Surgical Airway 07/27/23 1814 Shiley 8.0 mm DIC cuffed Extra Large Cuffed Distal 8.0 / 85 mm 8.0   Placement Date/Time: 07/27/23 1814   Present Prior to Hospital Arrival?: No  Inserted by: MD  Placed By: ICU physician  Type: Tracheostomy  Brand: Shiley  Airway Device Size and Style: 8.0 mm DIC cuffed  Airway Device Style: Extra Large Cuffed Distal ...   Cuff Inflation? Inflated   Speaking Valve Usage Not wearing   Status Secured   Site Assessment Clean;Dry;No bleeding;No drainage   Ties Assessment Clean;Dry;Intact;Secure   Airway Safety   Is Ambu Bag and Mask with Patient? Yes, Adult Ambu Bag and Mask   Suction set is at the bedside? Yes   Extra trach at bedside? Yes   Extra trach sizes at bedside? 6;8  (xlt)   Is Obturator Available? Yes   Location of Obturator?  Bedside table   Vent Select   Conventional Vent Y   Charged w/in last 24h YES   Preset Conventional Ventilator Settings   Vent ID [id number]   Vent Type    Humidity HME   Conventional Ventilator Alarms   Alarms On Y   Ve High Alarm 22 L/min   Ve Low Alarm 3 L/min   Vt High Alarm 1200 mL   Vt Low Alarm 200 mL     Per Dr. Garcia vent settings are not to be changed. Patient is only here for  antibiotics, not ventilatory support/respiratory distress. PEEP increased back to 8 from previous [place] vent settings.

## 2023-08-04 NOTE — PLAN OF CARE
Replaced by [facility]  Initial Discharge Assessment       Primary Care Provider: Nikky, Primary Doctor    Admission Diagnosis: Pneumonia [J18.9]    Admission Date: 8/3/2023  Expected Discharge Date:         Pt recent discharge from [facility]. Pt returned to finish antibiotics. All demographic information remains unchanged. No HH. Do dialysis. No coumadin. Plan is to return to [facility] after completing abx via ambulance transport. Pt has no other needs to be addressed at this time.     Transition of Care Barriers: None    Payor: MEDICARE / Plan: MEDICARE PART A & B / Product Type: Government /     Extended Emergency Contact Information  Primary Emergency Contact: Amelia Hidalgo  Mobile Phone: [phone]  Relation: Sister  Preferred language: English   needed? No  Secondary Emergency Contact: Starr Felix  Mobile Phone: [phone]  Relation: Daughter    Discharge Plan A: Return to nursing home  Discharge Plan B: Return to Nursing Home      Formerly [facility], [address] [address]  Phone: [phone] Fax: [phone]    [facility] - [address] [address]  Phone: [phone] Fax: [phone]      Initial Assessment (most recent)       Adult Discharge Assessment - 08/04/23 1059          Discharge Assessment    Assessment Type Discharge Planning Assessment     Confirmed/corrected address, phone number and insurance Yes     Confirmed Demographics Correct on Facesheet     Source of Information health record     Communicated [place] with patient/caregiver Date not available/Unable to determine     Reason For Admission Finish Iv abx     People in Home facility resident     Facility Arrived From: [facility]     Do you expect to return to your current living situation? Yes     Prior to hospitilization cognitive status: Unable to Assess     Current cognitive status: Unable  to Assess     Walking or Climbing Stairs ambulation difficulty, dependent     Dressing/Bathing bathing difficulty, dependent     Home Accessibility wheelchair accessible     Home Layout Able to live on 1st floor     Equipment Currently Used at Home ventilator     Readmission within 30 days? Yes     Patient currently being followed by outpatient case management? No     Do you currently have service(s) that help you manage your care at home? No     Do you take prescription medications? Yes     Do you have prescription coverage? Yes     Coverage Payor:  MEDICARE - MEDICARE PART A & B     Do you have any problems affording any of your prescribed medications? No     Is the patient taking medications as prescribed? yes     Who is going to help you get home at discharge? Facility [facility] transport     Are you on dialysis? No     Do you take coumadin? No     Discharge Plan A Return to nursing home     Discharge Plan B Return to Nursing Home     DME Needed Upon Discharge  none     Transition of Care Barriers None

## 2023-08-04 NOTE — PLAN OF CARE
08/04/23 1103   Readmission   Why were you hospitalized in the last 30 days? Pneumonia of both lungs due to infectious organism   Why were you readmitted? Medication Issue/question   When you left the hospital where did you go? Nursing Home   Tell me about what happened between when you left the hospital and the day you returned. Pt readmission due to facility being unable to afford Pt's antibiotics.

## 2023-08-04 NOTE — CARE UPDATE
08/04/23 0725   Patient Assessment/Suction   Level of Consciousness (AVPU) responds to pain   Respiratory Effort Normal;Unlabored   Expansion/Accessory Muscles/Retractions expansion symmetric;no retractions;no use of accessory muscles   All Lung Fields Breath Sounds diminished   Rhythm/Pattern, Respiratory assisted mechanically   Cough Frequency with stimulation   Cough Type assisted   Suction Method oral;tracheal   Suction Pressure (mmHg) -120 mmHg   $ Suction Charges Inline Suction Procedure Stat Charge   Secretions Amount small   Secretions Color white   Secretions Characteristics thick   Skin Integrity   $ Wound Care Tech Time 15 min   Area Observed Neck;Neck under tracheostomy   Skin Appearance redness blanchable  (around stoma)   Barrier used?   (split gauze)   PRE-TX-O2   Device (Oxygen Therapy) ventilator   $ Is the patient on Low Flow Oxygen? Yes   Pulse Oximetry Type Continuous   $ Pulse Oximetry - Multiple Charge Pulse Oximetry - Multiple   Aerosol Therapy   $ Aerosol Therapy Charges PRN treatment not required   Respiratory Treatment Status (SVN) PRN treatment not required   Adult Surgical Airway 07/27/23 1814 Shiley 8.0 mm DIC cuffed Extra Large Cuffed Distal 8.0 / 85 mm 8.0   Placement Date/Time: 07/27/23 1814   Present Prior to Hospital Arrival?: No  Inserted by: MD  Placed By: ICU physician  Type: Tracheostomy  Brand: Shiley  Airway Device Size and Style: 8.0 mm DIC cuffed  Airway Device Style: Extra Large Cuffed Distal ...   Cuff Pressure   (mlt, green)   Cuff Inflation? Inflated   Speaking Valve Usage Not wearing   Status Secured   Site Assessment Oozing secretions   Site Care Cleansed;Dried;Dressing applied   Airway Safety   Is Ambu Bag and Mask with Patient? Yes, Adult Ambu Bag and Mask   Trach Supplies at Bedside Inner Cannula;Trach Ties;Obturator;Ambu Bag and Mask (in patients room);Suction Catheter   Suction set is at the bedside? Yes   Extra trach at bedside? Yes   Extra trach sizes at  bedside? 8;6  (XLT)   Is Obturator Available? Yes   Location of Obturator?  Bedside table   Vent Select   Conventional Vent Y   $ Ventilator Subsequent 1   Charged w/in last 24h YES   Preset Conventional Ventilator Settings   Vent ID [id number]   Vent Type    Humidity HME   Conventional Ventilator Alarms   Alarms On Y   Ve High Alarm 22 L/min   Ve Low Alarm 3 L/min   Vt High Alarm 1200 mL   Vt Low Alarm 200 mL   Education   $ Education 15 min;Suction;Ventilator Oxygen

## 2023-08-04 NOTE — CARE UPDATE
08/03/23 1915   Patient Assessment/Suction   Level of Consciousness (AVPU) responds to pain   Respiratory Effort Unlabored   Expansion/Accessory Muscles/Retractions no use of accessory muscles   All Lung Fields Breath Sounds coarse   Rhythm/Pattern, Respiratory assisted mechanically   Cough Frequency with stimulation   Cough Type assisted   Suction Method tracheal;oral   $ Suction Charges Inline Suction Procedure Stat Charge   Secretions Amount moderate   Secretions Color white   Secretions Characteristics thick   PRE-TX-O2   Device (Oxygen Therapy) ventilator   $ Is the patient on Low Flow Oxygen? Yes   Oxygen Concentration (%) 40   SpO2 96 %   Pulse Oximetry Type Continuous   $ Pulse Oximetry - Multiple Charge Pulse Oximetry - Multiple   Pulse 81   Resp (!) 24   Positioning   Head of Bed (HOB) Positioning HOB elevated;HOB at 30 degrees   Vent Select   Conventional Vent Y   Charged w/in last 24h YES   Preset Conventional Ventilator Settings   Vent Type    Ventilation Type VC   Vent Mode A/C   Humidity HME   Set Rate 24 BPM   Vt Set 365 mL   PEEP/CPAP 8 cmH20   Pressure Support 0 cmH20   Waveform RAMP   Peak Flow 55 L/min   Plateau Set/Insp. Hold (sec) 0   Trigger Sensitivity Flow/I-Trigger 3 L/min   Patient Ventilator Parameters   Resp Rate Total 25 br/min   Peak Airway Pressure 39 cmH20   Mean Airway Pressure 16 cmH20   Plateau Pressure 18 cmH20   Exhaled Vt 412 mL   Total Ve 9.64 L/m   I:E Ratio Measured 1:2.50   Tubing ID (mm) 8 mm   Tube Type Trach   Conventional Ventilator Alarms   Alarms On Y   Resp Rate High Alarm 0 br/min   Press High Alarm 40 cmH2O   Apnea Rate 10   Apnea Volume (mL) 0 mL   Apnea Oxygen Concentration  100   Apnea Flow Rate (L/min) 44   T Apnea 20 sec(s)   Ready to Wean/Extubation Screen   FIO2<=50 (chart decimal) 0.4   MV<16L (chart vol.) 9.64   PEEP <=8 (chart #) 8   Ready to Wean Parameters   F/VT Ratio<105 (RSBI) (!) 58.25   Transport Patient   $ Transport Tech Time Charge 15  min   Time to transport 10   Oxygen Method Transport Vent   Transport to micu-3 330   Toleration Good   ETT Secure Yes   Ambu and mask at bedside Yes   Respiratory Evaluation   $ Care Plan Tech Time 15 min   $ Eval/Re-eval Charges Re-evaluation

## 2023-08-04 NOTE — CONSULTS
"[facility]  Adult Nutrition   Consult Note (Nutrition Support Management)    SUMMARY     Recommendations  1) Continue current TF of Glucerna 1.5 at 30 mls/h to provide 1080 kcals, 59 g protein and 546 mls water   2) FWF's of 30 mls every 4 hours to clear tube.   3) RD to monitor rate/tolerance, weight, labs, etc. and make recommendatiopns prn.    Goals:   Nutrition provision to meet 100% nutrition needs.    Nutrition Goal Status: progressing towards goal    Communication of RD Recs: reviewed with RN    Dietitian Rounds Brief  Consult for TF: Continue current TF of Glucerna 1.5 at the goal rate of 30 mls/h. LBM was 8/1/2023. Will continue to follow prn.    Diet order:   Current Diet Order: NPO      Evaluation of Received Nutrient/Fluid Intake  Energy Calories Required: not meeting needs  Protein Required: not meeting needs  Fluid Required: meeting needs  Tolerance: not tolerating     % Intake of Estimated Energy Needs: 0 - 25 %  % Meal Intake: NPO      Intake/Output Summary (Last 24 hours) at 8/4/2023 1037  Last data filed at 8/4/2023 0825  Gross per 24 hour   Intake 217.66 ml   Output 165 ml   Net 52.66 ml        Anthropometrics  Temp: (!) 94.3 °F (34.6 °C)  Height Method: Estimated  Height: 5' 3" (160 cm)  Height (inches): 63 in  Weight Method: Bed Scale  Weight: 95.4 kg (210 lb 5.1 oz)  Weight (lb): 210.32 lb  Ideal Body Weight (IBW), Female: 115 lb  % Ideal Body Weight, Female (lb): 182.89 %  BMI (Calculated): 37.3  BMI Grade: 35 - 39.9 - obesity - grade II       Estimated/Assessed Needs  Weight Used For Calorie Calculations: 95.4 kg (210 lb 5.1 oz)  Energy Calorie Requirements (kcal): 6994-7709 kcals/day (11/14 kcals/kg ABW)  Energy Need Method: Kcal/kg  Protein Requirements:  g/day (1.5-2 g/kg IBW)  Weight Used For Protein Calculations: 52 kg (114 lb 10.2 oz)     Estimated Fluid Requirement Method: RDA Method  RDA Method (mL): 1049       Reason for Assessment  Reason For Assessment: " consult, new tube feeding  Diagnosis: other (see comments) (No active principal problem)  Relevant Medical History: Respiratory distress, Cirrhosis, Hemangioma of intra-abdominal structure, Tracheostomy dependence, Anoxic brain damage, Pulmonary embolus, Hypertension, GERD (gastroesophageal reflux disease), Protein calorie malnutrition, S/P percutaneous endoscopic gastrostomy (PEG) tube placement, Bedbound, Total self-care deficit, Nursing home resident, Cardiac arrest  Interdisciplinary Rounds: attended    Nutrition/Diet History  Patient Reported Diet/Restrictions/Preferences: no oral intake  Spiritual, Cultural Beliefs, Gnosticist Practices, Values that Affect Care: no  Food Allergies: NKFA  Factors Affecting Nutritional Intake: NPO    Nutrition Risk Screen  Nutrition Risk Screen: tube feeding or parenteral nutrition     MST Score: 0  Have you recently lost weight without trying?: No  Weight loss score: 0  Have you been eating poorly because of a decreased appetite?: No  Appetite score: 0       Weight History:  Wt Readings from Last 5 Encounters:   08/03/23 95.4 kg (210 lb 5.1 oz)   07/27/23 92.7 kg (204 lb 5.9 oz)   07/29/23 97.2 kg (214 lb 4.6 oz)   07/06/23 88.6 kg (195 lb 5.2 oz)   06/12/23 77.1 kg (170 lb)        Lab/Procedures/Meds: Pertinent Labs/Meds Reviewed    Medications:Pertinent Medications Reviewed  Scheduled Meds:   carboxymethylcellulose  1 drop Both Eyes QID    ceftolozane-tazobactam  3,000 mg Intravenous Q8H    enoxparin  40 mg Subcutaneous Daily    ergocalciferol  4,000 Units Per G Tube Daily    famotidine (PF)  20 mg Intravenous Q12H    levalbuterol  1.25 mg Nebulization Q8H    metoprolol tartrate  25 mg Per G Tube BID    miconazole   Topical (Top) BID    midodrine  5 mg Per G Tube TID AC    mupirocin   Nasal BID     Continuous Infusions:  PRN Meds:.acetaminophen, albuterol-ipratropium, calcium gluconate IVPB, calcium gluconate IVPB, calcium gluconate IVPB, magnesium oxide, magnesium oxide,  magnesium sulfate IVPB, magnesium sulfate IVPB, ondansetron, ondansetron, polyethylene glycol, potassium bicarbonate, potassium bicarbonate, potassium bicarbonate, potassium chloride **AND** potassium chloride **AND** potassium chloride, potassium, sodium phosphates, potassium, sodium phosphates, potassium, sodium phosphates, sodium chloride 0.9%, sodium phosphate 15 mmol in dextrose 5 % (D5W) 250 mL IVPB, sodium phosphate 20.01 mmol in dextrose 5 % (D5W) 250 mL IVPB, sodium phosphate 30 mmol in dextrose 5 % (D5W) 250 mL IVPB    Labs: Pertinent Labs Reviewed  Clinical Chemistry:  Recent Labs   Lab 07/31/23  0340 08/01/23  0320 08/03/23  1337 08/04/23  0324    139 136 139   K 3.6 3.8 4.3 3.7    103 104 103   CO2 30* 30* 28 28   GLU 97 104 86 82   BUN 17 14 18 19   CREATININE 0.3* <0.3* <0.3* <0.3*   CALCIUM 8.9 9.1 8.9 8.9   PROT 8.3 9.0* 9.1* 8.9*   ALBUMIN 2.9* 3.0* 3.0* 3.2*   BILITOT 0.9 0.6 0.6 0.7   ALKPHOS 125 126 122 128   AST 14 17 27 22   ALT 19 19 20 24   ANIONGAP 7* 6* 4* 8   MG 1.6 1.8  --  1.9   PHOS 4.0 3.4  --  3.4     CBC:   Recent Labs   Lab 08/04/23  0324   WBC 5.55   RBC 3.16*   HGB 8.4*   HCT 28.6*      MCV 91   MCH 26.6*   MCHC 29.4*     Cardiac Profile:  Recent Labs   Lab 07/29/23  0404   *       Monitor and Evaluation  Food and Nutrient Intake: food and beverage intake, energy intake  Food and Nutrient Adminstration: diet order  Knowledge/Beliefs/Attitudes: food and nutrition knowledge/skill, beliefs and attitudes  Physical Activity and Function: nutrition-related ADLs and IADLs, factors affecting access to physical activity  Anthropometric Measurements: weight, weight change, body mass index  Biochemical Data, Medical Tests and Procedures: lipid profile, inflammatory profile, glucose/endocrine profile, gastrointestinal profile, electrolyte and renal panel  Nutrition-Focused Physical Findings: overall appearance     Nutrition Risk  Level of Risk/Frequency of  Follow-up: moderate     Nutrition Follow-Up         Estrella Jo, JEFFERY 08/04/2023 10:37 AM

## 2023-08-04 NOTE — CONSULTS
Stage 2 pressure injury left buttock, 3x4.2x0.1cm pink wound bed, no redness or odor, perirectal pink scarring, cleaned and dried and applied thin layer of Triad, covere with mepilex.

## 2023-08-04 NOTE — PLAN OF CARE
Problem: Feeding Intolerance (Enteral Nutrition)  Goal: Feeding Tolerance  Outcome: Ongoing, Progressing  Intervention: Prevent and Manage Feeding Intolerance  Flowsheets (Taken 8/4/2023 1044)  Nutrition Support Management: tube feeding initiated

## 2023-08-04 NOTE — SUBJECTIVE & OBJECTIVE
"Interval History: see "Hospital Course"    Review of Systems   Unable to perform ROS: Patient nonverbal     Objective:     Vital Signs (Most Recent):  Temp: (!) 94.3 °F (34.6 °C) (08/04/23 0920)  Pulse: 65 (08/04/23 0920)  Resp: (!) 24 (08/04/23 0920)  BP: (!) 97/57 (08/04/23 0920)  SpO2: 100 % (08/04/23 0920) Vital Signs (24h Range):  Temp:  [92.2 °F (33.4 °C)-97.2 °F (36.2 °C)] 94.3 °F (34.6 °C)  Pulse:  [55-85] 65  Resp:  [18-33] 24  SpO2:  [95 %-100 %] 100 %  BP: ()/(51-72) 97/57     Weight: 95.4 kg (210 lb 5.1 oz)  Body mass index is 37.26 kg/m².    Intake/Output Summary (Last 24 hours) at 8/4/2023 1023  Last data filed at 8/4/2023 0825  Gross per 24 hour   Intake 217.66 ml   Output 165 ml   Net 52.66 ml         Physical Exam  Vitals and nursing note reviewed.   Constitutional:       General: She is not in acute distress.     Appearance: She is obese. She is ill-appearing.   HENT:      Head: Normocephalic and atraumatic.      Right Ear: External ear normal.      Nose: Nose normal.      Mouth/Throat:      Mouth: Mucous membranes are moist.      Pharynx: Oropharynx is clear.   Eyes:      Conjunctiva/sclera: Conjunctivae normal.   Neck:      Comments: Tracheostomy.  Cardiovascular:      Rate and Rhythm: Normal rate and regular rhythm.      Pulses: Normal pulses.      Heart sounds: Normal heart sounds.   Pulmonary:      Effort: No respiratory distress.      Breath sounds: Normal breath sounds.   Abdominal:      General: There is distension.      Tenderness: There is no abdominal tenderness.      Comments: G tube.   Genitourinary:     Comments: Hamilton.  Musculoskeletal:      Cervical back: Neck supple.      Right lower leg: No edema.      Left lower leg: No edema.   Skin:     General: Skin is warm and dry.   Neurological:      Mental Status: Mental status is at baseline.             Significant Labs: All pertinent labs within the past 24 hours have been reviewed.    Significant Imaging: I have reviewed all " pertinent imaging results/findings within the past 24 hours.

## 2023-08-04 NOTE — PROGRESS NOTES
[facility]  Progress Note    Patient Name: Genna Felix  MRN: [id number]  Patient Class: IP- Inpatient   Admission Date: 8/3/2023  Length of Stay: 1 days  Attending Physician: Kirill Garcia MD  Primary Care Provider: Nikky, Primary Doctor        Subjective:     Principal Problem:<principal problem not specified>        HPI:  Genna Felix is a 62 year old female with a past medical history of anoxic brain injury with quadriplegia, tracheostomy and G tube, PSVT, obesity, stage two sacral wound, HTN, anemia and right shoulder dislocation with recent admission for acute on chronic respiratory failure with CRP Pseudomonas VAP requiring new mechanical ventilation who presents back from [place] as the patient has apparently been unable to complete Zerbaxa therapy as this antibiotic is not available at that facility. She will be admitted to the ICU and complete four more days of Zerbaxa per prior ID recommendations.      Overview/Hospital Course:  Genna Felix is a 62 year old female with a past medical history of anoxic brain injury s/p G tube and tracheostomy, chronic respiratory failure, PE, GERD, HTN, hepatic steatosis, obesity, and anemia who presented with severe sepsis with acute respiratory failure requiring mechanical ventilation. Respiratory cultures showed  Pseudomonas. ID has been consulted and started Avycaz. Pulmonary was also consulted for ventilator management. Her course was complicated by tracheostomy cuff leak; Pulmonary replaced the cuff 7/27. She also developed SVT for which scheduled metoprolol has been ordered. Cardiology was consulted. She also was noted to have a right shoulder dislocation on CTA for which Orthopedic Surgery has been consulted; no intervention recommended at this time. She has been unable to be weaned from mechanical ventilation at this time;. She was discharged with a five day course of Zerbaxa 8/1/2023 only to return 8/3/2023 as [place]  [facility] did not have Zerbaxa to administer. She will complete the Zerbaxa course inpatient.      Interval History: see "Hospital Course"    Review of Systems   Unable to perform ROS: Patient nonverbal     Objective:     Vital Signs (Most Recent):  Temp: (!) 94.3 °F (34.6 °C) (08/04/23 0920)  Pulse: 65 (08/04/23 0920)  Resp: (!) 24 (08/04/23 0920)  BP: (!) 97/57 (08/04/23 0920)  SpO2: 100 % (08/04/23 0920) Vital Signs (24h Range):  Temp:  [92.2 °F (33.4 °C)-97.2 °F (36.2 °C)] 94.3 °F (34.6 °C)  Pulse:  [55-85] 65  Resp:  [18-33] 24  SpO2:  [95 %-100 %] 100 %  BP: ()/(51-72) 97/57     Weight: 95.4 kg (210 lb 5.1 oz)  Body mass index is 37.26 kg/m².    Intake/Output Summary (Last 24 hours) at 8/4/2023 1023  Last data filed at 8/4/2023 0825  Gross per 24 hour   Intake 217.66 ml   Output 165 ml   Net 52.66 ml         Physical Exam  Vitals and nursing note reviewed.   Constitutional:       General: She is not in acute distress.     Appearance: She is obese. She is ill-appearing.   HENT:      Head: Normocephalic and atraumatic.      Right Ear: External ear normal.      Nose: Nose normal.      Mouth/Throat:      Mouth: Mucous membranes are moist.      Pharynx: Oropharynx is clear.   Eyes:      Conjunctiva/sclera: Conjunctivae normal.   Neck:      Comments: Tracheostomy.  Cardiovascular:      Rate and Rhythm: Normal rate and regular rhythm.      Pulses: Normal pulses.      Heart sounds: Normal heart sounds.   Pulmonary:      Effort: No respiratory distress.      Breath sounds: Normal breath sounds.   Abdominal:      General: There is distension.      Tenderness: There is no abdominal tenderness.      Comments: G tube.   Genitourinary:     Comments: Hamilton.  Musculoskeletal:      Cervical back: Neck supple.      Right lower leg: No edema.      Left lower leg: No edema.   Skin:     General: Skin is warm and dry.   Neurological:      Mental Status: Mental status is at baseline.             Significant Labs: All pertinent " labs within the past 24 hours have been reviewed.    Significant Imaging: I have reviewed all pertinent imaging results/findings within the past 24 hours.      Assessment/Plan:      Chronic respiratory failure  -Continue home ventilator settings    Paroxysmal SVT (supraventricular tachycardia)  -Metoprolol  -Telemetry      Hepatic steatosis  -Trend LFTs      Obesity (BMI 30-39.9)  Body mass index is 37.26 kg/m². Morbid obesity complicates all aspects of disease management from diagnostic modalities to treatment.         Pressure injury of buttock, stage 2  -Wound Care      Pneumonia of both lungs due to infectious organism  -Zerbaxa for five days total      Multiple drug resistant organism (MDRO) culture positive  -Zerbaxa for a five day course for  Pseudomonas      On tube feeding diet  -Nutrition consulted      Difficult Hamilton catheter placement  -Hamilton in place      Essential hypertension  -Continue to monitor    PEG (percutaneous endoscopic gastrostomy) status  -Care per Nursing      Tracheostomy dependence  -Care per RT      Dislocation of right shoulder joint  -Ortho consulted; no intervention at this time    Anemia of chronic disease  -Trend Hgb with CBC        VTE Risk Mitigation (From admission, onward)         Ordered     enoxaparin injection 40 mg  Daily         08/04/23 0935     Place sequential compression device  Until discontinued         08/04/23 0935     IP VTE HIGH RISK PATIENT  Once         08/03/23 2028                Discharge Planning   [place]: 8/7/2023    Code Status: Full Code   Is the patient medically ready for discharge?:     Reason for patient still in hospital (select all that apply): Treatment                     Kirill Garcia MD  Department of Hospital Medicine   [facility]

## 2023-08-04 NOTE — HOSPITAL COURSE
Genna Felix is a 62 year old female with a past medical history of anoxic brain injury s/p G tube and tracheostomy, chronic respiratory failure, PE, GERD, HTN, hepatic steatosis, obesity, and anemia who presented with severe sepsis with acute respiratory failure requiring mechanical ventilation. Respiratory cultures showed  Pseudomonas. ID has been consulted and started Avycaz. Pulmonary was also consulted for ventilator management. Her course was complicated by tracheostomy cuff leak; Pulmonary replaced the cuff 7/27. She also developed SVT for which scheduled metoprolol has been ordered. Cardiology was consulted. She also was noted to have a right shoulder dislocation on CTA for which Orthopedic Surgery has been consulted; no intervention recommended at this time. She has been unable to be weaned from mechanical ventilation at this time;. She was discharged with a five day course of Zerbaxa 8/1/2023 only to return 8/3/2023 as [facility] did not have Zerbaxa to administer.  Patient completed the course of 6 days of Zerbaxa and was discharged back to [facility] for further ongoing care.

## 2023-08-04 NOTE — CARE UPDATE
08/04/23 1522   Patient Assessment/Suction   Level of Consciousness (AVPU) responds to pain   Respiratory Effort Normal;Unlabored   Expansion/Accessory Muscles/Retractions no use of accessory muscles;no retractions   All Lung Fields Breath Sounds diminished   Rhythm/Pattern, Respiratory assisted mechanically   Cough Frequency with stimulation   Cough Type assisted   PRE-TX-O2   Device (Oxygen Therapy) ventilator   $ Is the patient on Low Flow Oxygen? Yes   SpO2 98 %   Pulse Oximetry Type Continuous   $ Pulse Oximetry - Multiple Charge Pulse Oximetry - Multiple   Pulse 96   Resp (!) 26   Aerosol Therapy   $ Aerosol Therapy Charges Aerosol Treatment   Daily Review of Necessity (SVN) completed   Respiratory Treatment Status (SVN) given   Treatment Route (SVN) in-line;ventilator;tracheostomy   Patient Position (SVN) HOB elevated   Post Treatment Assessment (SVN) breath sounds unchanged   Signs of Intolerance (SVN) none   Breath Sounds Post-Respiratory Treatment   Throughout All Fields Post-Treatment All Fields   Throughout All Fields Post-Treatment no change   Post-treatment Heart Rate (beats/min) 95   Post-treatment Resp Rate (breaths/min) 26   Vent Select   Conventional Vent Y   Charged w/in last 24h YES   Preset Conventional Ventilator Settings   Vent ID [id number]   Vent Type    Humidity HME   PEEP/CPAP (S)  5 cmH20   Conventional Ventilator Alarms   Alarms On Y   Ready to Wean/Extubation Screen   PEEP <=8 (chart #) 5     Decreased PEEP to 5

## 2023-08-04 NOTE — ASSESSMENT & PLAN NOTE
Body mass index is 37.26 kg/m². Morbid obesity complicates all aspects of disease management from diagnostic modalities to treatment.

## 2023-08-05 PROBLEM — Z86.711 HX PULMONARY EMBOLISM: Status: RESOLVED | Noted: 2020-11-13 | Resolved: 2023-08-05

## 2023-08-05 PROBLEM — E44.0 MALNUTRITION OF MODERATE DEGREE: Status: RESOLVED | Noted: 2021-05-11 | Resolved: 2023-08-05

## 2023-08-05 PROBLEM — Z78.9 ON TUBE FEEDING DIET: Status: RESOLVED | Noted: 2020-11-13 | Resolved: 2023-08-05

## 2023-08-05 PROBLEM — K02.9 DENTAL CARIES: Status: RESOLVED | Noted: 2020-07-16 | Resolved: 2023-08-05

## 2023-08-05 PROBLEM — Z71.89 GOALS OF CARE, COUNSELING/DISCUSSION: Status: RESOLVED | Noted: 2021-04-22 | Resolved: 2023-08-05

## 2023-08-05 PROBLEM — R79.89 LOW SERUM CORTISOL LEVEL: Status: RESOLVED | Noted: 2023-07-07 | Resolved: 2023-08-05

## 2023-08-05 PROBLEM — K21.9 GASTROESOPHAGEAL REFLUX DISEASE: Status: RESOLVED | Noted: 2021-10-24 | Resolved: 2023-08-05

## 2023-08-05 PROBLEM — M79.89 SWELLING OF ARM: Status: RESOLVED | Noted: 2022-08-22 | Resolved: 2023-08-05

## 2023-08-05 PROBLEM — S09.0XXA: Status: RESOLVED | Noted: 2020-07-16 | Resolved: 2023-08-05

## 2023-08-05 PROBLEM — J45.909 ASTHMA: Status: RESOLVED | Noted: 2019-01-17 | Resolved: 2023-08-05

## 2023-08-05 PROBLEM — R53.2 FUNCTIONAL QUADRIPLEGIA: Status: ACTIVE | Noted: 2023-08-05

## 2023-08-05 LAB
ALBUMIN SERPL BCP-MCNC: 3.2 G/DL (ref 3.5–5.2)
ALBUMIN SERPL BCP-MCNC: 3.2 G/DL (ref 3.5–5.2)
ALP SERPL-CCNC: 128 U/L (ref 55–135)
ALP SERPL-CCNC: 128 U/L (ref 55–135)
ALT SERPL W/O P-5'-P-CCNC: 22 U/L (ref 10–44)
ALT SERPL W/O P-5'-P-CCNC: 22 U/L (ref 10–44)
ANION GAP SERPL CALC-SCNC: 8 MMOL/L (ref 8–16)
ANION GAP SERPL CALC-SCNC: 8 MMOL/L (ref 8–16)
AST SERPL-CCNC: 24 U/L (ref 10–40)
AST SERPL-CCNC: 24 U/L (ref 10–40)
BASOPHILS # BLD AUTO: 0.02 K/UL (ref 0–0.2)
BASOPHILS NFR BLD: 0.3 % (ref 0–1.9)
BILIRUB SERPL-MCNC: 0.8 MG/DL (ref 0.1–1)
BILIRUB SERPL-MCNC: 0.8 MG/DL (ref 0.1–1)
BUN SERPL-MCNC: 25 MG/DL (ref 6–20)
BUN SERPL-MCNC: 25 MG/DL (ref 6–20)
CALCIUM SERPL-MCNC: 8.9 MG/DL (ref 8.7–10.5)
CALCIUM SERPL-MCNC: 8.9 MG/DL (ref 8.7–10.5)
CHLORIDE SERPL-SCNC: 106 MMOL/L (ref 95–110)
CHLORIDE SERPL-SCNC: 106 MMOL/L (ref 95–110)
CO2 SERPL-SCNC: 28 MMOL/L (ref 23–29)
CO2 SERPL-SCNC: 28 MMOL/L (ref 23–29)
CREAT SERPL-MCNC: 0.4 MG/DL (ref 0.5–1.4)
CREAT SERPL-MCNC: 0.4 MG/DL (ref 0.5–1.4)
DIFFERENTIAL METHOD: ABNORMAL
EOSINOPHIL # BLD AUTO: 0.2 K/UL (ref 0–0.5)
EOSINOPHIL NFR BLD: 2.6 % (ref 0–8)
ERYTHROCYTE [DISTWIDTH] IN BLOOD BY AUTOMATED COUNT: 21.4 % (ref 11.5–14.5)
EST. GFR  (NO RACE VARIABLE): >60 ML/MIN/1.73 M^2
EST. GFR  (NO RACE VARIABLE): >60 ML/MIN/1.73 M^2
GLUCOSE SERPL-MCNC: 92 MG/DL (ref 70–110)
GLUCOSE SERPL-MCNC: 92 MG/DL (ref 70–110)
HCT VFR BLD AUTO: 28.1 % (ref 37–48.5)
HGB BLD-MCNC: 8.2 G/DL (ref 12–16)
IMM GRANULOCYTES # BLD AUTO: 0.01 K/UL (ref 0–0.04)
IMM GRANULOCYTES NFR BLD AUTO: 0.2 % (ref 0–0.5)
LYMPHOCYTES # BLD AUTO: 1.8 K/UL (ref 1–4.8)
LYMPHOCYTES NFR BLD: 29.7 % (ref 18–48)
MAGNESIUM SERPL-MCNC: 2 MG/DL (ref 1.6–2.6)
MCH RBC QN AUTO: 26.3 PG (ref 27–31)
MCHC RBC AUTO-ENTMCNC: 29.2 G/DL (ref 32–36)
MCV RBC AUTO: 90 FL (ref 82–98)
MONOCYTES # BLD AUTO: 0.7 K/UL (ref 0.3–1)
MONOCYTES NFR BLD: 11.6 % (ref 4–15)
NEUTROPHILS # BLD AUTO: 3.4 K/UL (ref 1.8–7.7)
NEUTROPHILS NFR BLD: 55.6 % (ref 38–73)
NRBC BLD-RTO: 0 /100 WBC
PHOSPHATE SERPL-MCNC: 3.9 MG/DL (ref 2.7–4.5)
PLATELET # BLD AUTO: 356 K/UL (ref 150–450)
PMV BLD AUTO: 10.8 FL (ref 9.2–12.9)
POTASSIUM SERPL-SCNC: 4 MMOL/L (ref 3.5–5.1)
POTASSIUM SERPL-SCNC: 4 MMOL/L (ref 3.5–5.1)
PROT SERPL-MCNC: 9.2 G/DL (ref 6–8.4)
PROT SERPL-MCNC: 9.2 G/DL (ref 6–8.4)
RBC # BLD AUTO: 3.12 M/UL (ref 4–5.4)
SODIUM SERPL-SCNC: 142 MMOL/L (ref 136–145)
SODIUM SERPL-SCNC: 142 MMOL/L (ref 136–145)
WBC # BLD AUTO: 6.19 K/UL (ref 3.9–12.7)

## 2023-08-05 PROCEDURE — 94761 N-INVAS EAR/PLS OXIMETRY MLT: CPT

## 2023-08-05 PROCEDURE — 99900026 HC AIRWAY MAINTENANCE (STAT)

## 2023-08-05 PROCEDURE — 94640 AIRWAY INHALATION TREATMENT: CPT

## 2023-08-05 PROCEDURE — 84100 ASSAY OF PHOSPHORUS: CPT | Performed by: STUDENT IN AN ORGANIZED HEALTH CARE EDUCATION/TRAINING PROGRAM

## 2023-08-05 PROCEDURE — 94003 VENT MGMT INPAT SUBQ DAY: CPT

## 2023-08-05 PROCEDURE — 63600175 PHARM REV CODE 636 W HCPCS: Performed by: STUDENT IN AN ORGANIZED HEALTH CARE EDUCATION/TRAINING PROGRAM

## 2023-08-05 PROCEDURE — 36415 COLL VENOUS BLD VENIPUNCTURE: CPT | Performed by: STUDENT IN AN ORGANIZED HEALTH CARE EDUCATION/TRAINING PROGRAM

## 2023-08-05 PROCEDURE — 20000000 HC ICU ROOM

## 2023-08-05 PROCEDURE — 83735 ASSAY OF MAGNESIUM: CPT | Performed by: STUDENT IN AN ORGANIZED HEALTH CARE EDUCATION/TRAINING PROGRAM

## 2023-08-05 PROCEDURE — 25000003 PHARM REV CODE 250: Performed by: STUDENT IN AN ORGANIZED HEALTH CARE EDUCATION/TRAINING PROGRAM

## 2023-08-05 PROCEDURE — 85025 COMPLETE CBC W/AUTO DIFF WBC: CPT | Performed by: STUDENT IN AN ORGANIZED HEALTH CARE EDUCATION/TRAINING PROGRAM

## 2023-08-05 PROCEDURE — 25000242 PHARM REV CODE 250 ALT 637 W/ HCPCS: Performed by: STUDENT IN AN ORGANIZED HEALTH CARE EDUCATION/TRAINING PROGRAM

## 2023-08-05 PROCEDURE — 99900035 HC TECH TIME PER 15 MIN (STAT)

## 2023-08-05 PROCEDURE — 27000221 HC OXYGEN, UP TO 24 HOURS

## 2023-08-05 PROCEDURE — 80053 COMPREHEN METABOLIC PANEL: CPT | Performed by: STUDENT IN AN ORGANIZED HEALTH CARE EDUCATION/TRAINING PROGRAM

## 2023-08-05 RX ADMIN — FAMOTIDINE 20 MG: 10 INJECTION INTRAVENOUS at 08:08

## 2023-08-05 RX ADMIN — LEVALBUTEROL HYDROCHLORIDE 1.25 MG: 1.25 SOLUTION RESPIRATORY (INHALATION) at 04:08

## 2023-08-05 RX ADMIN — Medication 4000 UNITS: at 08:08

## 2023-08-05 RX ADMIN — CARBOXYMETHYLCELLULOSE SODIUM 1 DROP: 5 SOLUTION/ DROPS OPHTHALMIC at 05:08

## 2023-08-05 RX ADMIN — MICONAZOLE NITRATE: 20 CREAM TOPICAL at 08:08

## 2023-08-05 RX ADMIN — ENOXAPARIN SODIUM 40 MG: 100 INJECTION SUBCUTANEOUS at 04:08

## 2023-08-05 RX ADMIN — CARBOXYMETHYLCELLULOSE SODIUM 1 DROP: 5 SOLUTION/ DROPS OPHTHALMIC at 08:08

## 2023-08-05 RX ADMIN — CEFTOLOZANE AND TAZOBACTAM 3000 MG: 1; .5 INJECTION, POWDER, LYOPHILIZED, FOR SOLUTION INTRAVENOUS at 06:08

## 2023-08-05 RX ADMIN — MUPIROCIN 1 G: 20 OINTMENT TOPICAL at 08:08

## 2023-08-05 RX ADMIN — MIDODRINE HYDROCHLORIDE 5 MG: 2.5 TABLET ORAL at 11:08

## 2023-08-05 RX ADMIN — LEVALBUTEROL HYDROCHLORIDE 1.25 MG: 1.25 SOLUTION RESPIRATORY (INHALATION) at 08:08

## 2023-08-05 RX ADMIN — METOPROLOL TARTRATE 25 MG: 25 TABLET, FILM COATED ORAL at 08:08

## 2023-08-05 RX ADMIN — CEFTOLOZANE AND TAZOBACTAM 3000 MG: 1; .5 INJECTION, POWDER, LYOPHILIZED, FOR SOLUTION INTRAVENOUS at 03:08

## 2023-08-05 RX ADMIN — LEVALBUTEROL HYDROCHLORIDE 1.25 MG: 1.25 SOLUTION RESPIRATORY (INHALATION) at 12:08

## 2023-08-05 RX ADMIN — CEFTOLOZANE AND TAZOBACTAM 3000 MG: 1; .5 INJECTION, POWDER, LYOPHILIZED, FOR SOLUTION INTRAVENOUS at 11:08

## 2023-08-05 RX ADMIN — MIDODRINE HYDROCHLORIDE 5 MG: 2.5 TABLET ORAL at 06:08

## 2023-08-05 RX ADMIN — MIDODRINE HYDROCHLORIDE 5 MG: 2.5 TABLET ORAL at 04:08

## 2023-08-05 NOTE — CARE UPDATE
08/04/23 1945   Patient Assessment/Suction   Level of Consciousness (AVPU) responds to pain   Respiratory Effort Unlabored   Expansion/Accessory Muscles/Retractions expansion symmetric   All Lung Fields Breath Sounds diminished;wheezes, expiratory   Rhythm/Pattern, Respiratory assisted mechanically   Cough Frequency with stimulation   Cough Type assisted   Suction Method oral;tracheal   $ Suction Charges Inline Suction Procedure Stat Charge   Secretions Amount small   Secretions Color creamy   Secretions Characteristics thick   PRE-TX-O2   Device (Oxygen Therapy) ventilator   $ Is the patient on Low Flow Oxygen? Yes   Oxygen Concentration (%) 40   SpO2 100 %   Pulse Oximetry Type Continuous   $ Pulse Oximetry - Multiple Charge Pulse Oximetry - Multiple   Pulse 79   Resp (!) 24   Positioning   Head of Bed (HOB) Positioning HOB elevated;HOB at 30 degrees   Vent Select   Conventional Vent Y   Charged w/in last 24h YES   Preset Conventional Ventilator Settings   Vent Type    Ventilation Type VC   Vent Mode A/C   Humidity HME   Set Rate 24 BPM   Vt Set 365 mL   PEEP/CPAP 8 cmH20   Peak Flow 55 L/min   Peak End Inspiratory Pressure 30 cmH20   I-Trigger Type  V-TRIG   Trigger Sensitivity Flow/I-Trigger 3 L/min   Patient Ventilator Parameters   Resp Rate Total 24 br/min   Peak Airway Pressure 36 cmH20   Mean Airway Pressure 16 cmH20   Plateau Pressure 27 cmH20   Exhaled Vt 372 mL   Total Ve 8.94 L/m   I:E Ratio Measured 1:2.50   Auto PEEP 4.5 cmH20   Conventional Ventilator Alarms   Alarms On Y   Resp Rate High Alarm 40 br/min   Press High Alarm 60 cmH2O   Apnea Rate 24   Apnea Volume (mL) 0 mL   Apnea Oxygen Concentration  100   Apnea Flow Rate (L/min) 55   T Apnea 20 sec(s)   Ready to Wean/Extubation Screen   FIO2<=50 (chart decimal) 0.4   MV<16L (chart vol.) 8.94   PEEP <=8 (chart #) 8   Ready to Wean Parameters   F/VT Ratio<105 (RSBI) (!) 64.52   Vital Capacity   Vital Capacity (mL) 0   Respiratory Evaluation    $ Care Plan Tech Time 15 min   $ Eval/Re-eval Charges Re-evaluation

## 2023-08-05 NOTE — ASSESSMENT & PLAN NOTE
Noted, patient currently is a full code.  Will have advanced care planning conversation with her family once available.

## 2023-08-05 NOTE — ASSESSMENT & PLAN NOTE
Patient with Chronic debility due to functional quadraplegia. Latest AMPAC and GEMS scores have been reviewed. Evaluation for etiology is complete. Plan includes progressive mobility protocol initated and fall precautions in place.

## 2023-08-05 NOTE — PLAN OF CARE
Problem: Adult Inpatient Plan of Care  Goal: Plan of Care Review  Outcome: Ongoing, Progressing  Goal: Readiness for Transition of Care  Outcome: Ongoing, Progressing     Problem: Fluid Imbalance (Pneumonia)  Goal: Fluid Balance  Outcome: Ongoing, Progressing     Problem: Device-Related Complication Risk (Mechanical Ventilation, Invasive)  Goal: Optimal Device Function  Outcome: Ongoing, Progressing     Problem: Skin and Tissue Injury (Artificial Airway)  Goal: Absence of Device-Related Skin or Tissue Injury  Outcome: Ongoing, Progressing   Tube feedings started. Pt was low on oral and axillary temp this morning. Bear hugger applied. Temperature halle too much this afternoon requiring bear hugger and blankets to be removed. Tylenol given via peg. Repositioned with pillows. Pt is non verbal. Contracted BUE and BLE.

## 2023-08-05 NOTE — SUBJECTIVE & OBJECTIVE
Interval History:  Patient seen and examined.  Significant temperature instability overnight.  Remains nonverbal and nonreactive.      Objective:     Vital Signs (Most Recent):  Temp: 98.5 °F (36.9 °C) (ishan hugger off) (08/05/23 1501)  Pulse: 68 (08/05/23 1615)  Resp: (!) 30 (08/05/23 1615)  BP: 106/60 (08/05/23 1501)  SpO2: 100 % (08/05/23 1615) Vital Signs (24h Range):  Temp:  [95 °F (35 °C)-101.2 °F (38.4 °C)] 98.5 °F (36.9 °C)  Pulse:  [] 68  Resp:  [21-35] 30  SpO2:  [97 %-100 %] 100 %  BP: ()/(45-69) 106/60     Weight: 95.4 kg (210 lb 5.1 oz)  Body mass index is 37.26 kg/m².    Intake/Output Summary (Last 24 hours) at 8/5/2023 1629  Last data filed at 8/5/2023 1455  Gross per 24 hour   Intake 270 ml   Output 345 ml   Net -75 ml         Physical Exam  Vitals and nursing note reviewed.   Constitutional:       General: She is not in acute distress.  Eyes:      Pupils: Pupils are equal, round, and reactive to light.   Neck:      Comments: Tracheostomy in place  Cardiovascular:      Rate and Rhythm: Normal rate and regular rhythm.      Heart sounds: No murmur heard.  Pulmonary:      Comments: Increased respiratory rate with coarse bilateral breath sounds noted  Abdominal:      General: There is no distension.      Palpations: Abdomen is soft.      Tenderness: There is no abdominal tenderness.      Comments: Percutaneous gastrostomy in place   Genitourinary:     Comments: Hamilton catheter in place  Skin:     Coloration: Skin is not pale.      Findings: No rash.   Neurological:      Mental Status: She is alert.      Comments: Nonverbal, nonreactive.  Significant spasticity noted.             Significant Labs: All pertinent labs within the past 24 hours have been reviewed.    Significant Imaging: I have reviewed all pertinent imaging results/findings within the past 24 hours.

## 2023-08-05 NOTE — ASSESSMENT & PLAN NOTE
Patient with current diagnosis of pneumonia due to bacterial etiology due to  Pseudomonas which is uncontrolled due to persistent hypoxia . I have reviewed the pertinent imaging. Current antimicrobial regimen consists of   Antibiotics (From admission, onward)    Start     Stop Route Frequency Ordered    08/04/23 1115  ceftolozane-tazobactam (ZERBAXA) 3,000 mg in dextrose 5 % (D5W) 100 mL         08/08/23 0314 IV Every 8 hours (non-standard times) 08/04/23 0935    08/03/23 2145  mupirocin 2 % ointment         08/08/23 2059 Nasl 2 times daily 08/03/23 2033      . Cultures drawn and noted-   Microbiology Results (last 7 days)     Procedure Component Value Units Date/Time    Culture, Respiratory with Gram Stain [[id number]] Collected: 08/03/23 1831    Order Status: Completed Specimen: Respiratory from Endotracheal Aspirate Updated: 08/05/23 1013     Respiratory Culture Insufficient incubation, culture in progress      No Growth     Gram Stain (Respiratory) <10 epithelial cells per low power field.     Gram Stain (Respiratory) Rare WBC's     Gram Stain (Respiratory) No organisms seen         Will monitor patient closely and continue current treatment plan unchanged.  Will continue for 5 days of treatment with Zerbaxa finishing on 08/08.

## 2023-08-05 NOTE — ASSESSMENT & PLAN NOTE
Patient's anemia is currently controlled. Has not received any PRBCs to date.. Etiology likely d/t multifactorial  Current CBC reviewed-   Lab Results   Component Value Date    HGB 8.2 (L) 08/05/2023    HCT 28.1 (L) 08/05/2023     Monitor serial CBC and transfuse if patient becomes hemodynamically unstable, symptomatic or H/H drops below 7/21.

## 2023-08-05 NOTE — PLAN OF CARE
Problem: Adult Inpatient Plan of Care  Goal: Plan of Care Review  Outcome: Ongoing, Progressing  Goal: Patient-Specific Goal (Individualized)  Outcome: Ongoing, Progressing  Goal: Absence of Hospital-Acquired Illness or Injury  Outcome: Ongoing, Progressing  Goal: Optimal Comfort and Wellbeing  Outcome: Ongoing, Progressing  Goal: Readiness for Transition of Care  Outcome: Ongoing, Progressing     Problem: Bariatric Environmental Safety  Goal: Safety Maintained with Care  Outcome: Ongoing, Progressing     Problem: Fluid Imbalance (Pneumonia)  Goal: Fluid Balance  Outcome: Ongoing, Progressing     Problem: Infection (Pneumonia)  Goal: Resolution of Infection Signs and Symptoms  Outcome: Ongoing, Progressing     Problem: Respiratory Compromise (Pneumonia)  Goal: Effective Oxygenation and Ventilation  Outcome: Ongoing, Progressing     Problem: Infection  Goal: Absence of Infection Signs and Symptoms  Outcome: Ongoing, Progressing     Problem: Impaired Wound Healing  Goal: Optimal Wound Healing  Outcome: Ongoing, Progressing     Problem: Communication Impairment (Mechanical Ventilation, Invasive)  Goal: Effective Communication  Outcome: Ongoing, Progressing     Problem: Device-Related Complication Risk (Mechanical Ventilation, Invasive)  Goal: Optimal Device Function  Outcome: Ongoing, Progressing     Problem: Inability to Wean (Mechanical Ventilation, Invasive)  Goal: Mechanical Ventilation Liberation  Outcome: Ongoing, Progressing     Problem: Nutrition Impairment (Mechanical Ventilation, Invasive)  Goal: Optimal Nutrition Delivery  Outcome: Ongoing, Progressing     Problem: Skin and Tissue Injury (Mechanical Ventilation, Invasive)  Goal: Absence of Device-Related Skin and Tissue Injury  Outcome: Ongoing, Progressing     Problem: Ventilator-Induced Lung Injury (Mechanical Ventilation, Invasive)  Goal: Absence of Ventilator-Induced Lung Injury  Outcome: Ongoing, Progressing     Problem: Communication Impairment  (Artificial Airway)  Goal: Effective Communication  Outcome: Ongoing, Progressing     Problem: Device-Related Complication Risk (Artificial Airway)  Goal: Optimal Device Function  Outcome: Ongoing, Progressing     Problem: Skin and Tissue Injury (Artificial Airway)  Goal: Absence of Device-Related Skin or Tissue Injury  Outcome: Ongoing, Progressing     Problem: Noninvasive Ventilation Acute  Goal: Effective Unassisted Ventilation and Oxygenation  Outcome: Ongoing, Progressing     Problem: Skin Injury Risk Increased  Goal: Skin Health and Integrity  Outcome: Ongoing, Progressing     Problem: Aspiration (Enteral Nutrition)  Goal: Absence of Aspiration Signs and Symptoms  Outcome: Ongoing, Progressing     Problem: Device-Related Complication Risk (Enteral Nutrition)  Goal: Safe, Effective Therapy Delivery  Outcome: Ongoing, Progressing     Problem: Feeding Intolerance (Enteral Nutrition)  Goal: Feeding Tolerance  Outcome: Ongoing, Progressing     Problem: Fall Injury Risk  Goal: Absence of Fall and Fall-Related Injury  Outcome: Ongoing, Progressing

## 2023-08-05 NOTE — ASSESSMENT & PLAN NOTE
Chronic, controlled.  Latest blood pressure and vitals reviewed-     Temp:  [95 °F (35 °C)-101.2 °F (38.4 °C)]   Pulse:  []   Resp:  [21-35]   BP: ()/(45-69)   SpO2:  [97 %-100 %] .   Home meds for hypertension were reviewed and noted below-  Hypertension Medications             metoprolol tartrate (LOPRESSOR) 50 MG tablet 0.5 tablets (25 mg total) by Per G Tube route 2 (two) times daily.          While in the hospital, will manage blood pressure as follows; Continue home antihypertensive regimen    Will utilize p.r.n. blood pressure medication only if patient's blood pressure greater than 180/110 and she develops symptoms such as worsening chest pain or shortness of breath.

## 2023-08-05 NOTE — ASSESSMENT & PLAN NOTE
Patient with Hypoxic Respiratory failure which is Chronic.  she is on vent at chronic facility. Supplemental oxygen was provided and noted- Vent Mode: A/C  Oxygen Concentration (%):  [40] 40  Resp Rate Total:  [24 br/min-65 br/min] 32 br/min  Vt Set:  [365 mL] 365 mL  PEEP/CPAP:  [8 cmH20] 8 cmH20  Mean Airway Pressure:  [14 kxJ99-10 cmH20] 18 cmH20    Contributing diagnoses includes - COPD and Pneumonia Labs and images were reviewed. Patient Has recent ABG, which has been reviewed. Will treat underlying causes and adjust management of respiratory failure as noted below    ABG  Recent Labs   Lab 08/01/23  0316   PH 7.455*   PO2 73*   PCO2 43.7   HCO3 30.7*   BE 7

## 2023-08-05 NOTE — CARE UPDATE
08/05/23 0825   Patient Assessment/Suction   Level of Consciousness (AVPU) responds to pain   Expansion/Accessory Muscles/Retractions expansion symmetric   MAYNOR Breath Sounds coarse   LLL Breath Sounds coarse   RUL Breath Sounds coarse   RML Breath Sounds coarse   RLL Breath Sounds coarse   Rhythm/Pattern, Respiratory assisted mechanically   Cough Frequency with stimulation   Cough Type assisted   Suction Method tracheal   $ Suction Charges Inline Suction Procedure Stat Charge   Secretions Amount moderate   Secretions Color white   Secretions Characteristics thick   Skin Integrity   $ Wound Care Tech Time 15 min   Area Observed Neck under tracheostomy   Skin Appearance redness blanchable   PRE-TX-O2   Device (Oxygen Therapy) ventilator   $ Is the patient on Low Flow Oxygen? Yes   Oxygen Concentration (%) 40   SpO2 100 %   Pulse Oximetry Type Continuous   $ Pulse Oximetry - Multiple Charge Pulse Oximetry - Multiple   Pulse 63   Resp (!) 27   Aerosol Therapy   $ Aerosol Therapy Charges Aerosol Treatment   Daily Review of Necessity (SVN) completed   Respiratory Treatment Status (SVN) given   Treatment Route (SVN) in-line   Patient Position (SVN) semi-Le's   Signs of Intolerance (SVN) none   Breath Sounds Post-Respiratory Treatment   Throughout All Fields Post-Treatment aeration increased   Post-treatment Heart Rate (beats/min) 71   Post-treatment Resp Rate (breaths/min) 33   Adult Surgical Airway 07/27/23 1814 Shiley 8.0 mm DIC cuffed Extra Large Cuffed Distal 8.0 / 85 mm 8.0   Placement Date/Time: 07/27/23 1814   Present Prior to Hospital Arrival?: No  Inserted by: MD  Placed By: ICU physician  Type: Tracheostomy  Brand: Shiley  Airway Device Size and Style: 8.0 mm DIC cuffed  Airway Device Style: Extra Large Cuffed Distal ...   Cuff Pressure   (MLT)   Cuff Inflation? Inflated   Speaking Valve Usage Not wearing   Status Secured   Site Assessment Clean;Dry   Airway Safety   Is Ambu Bag and Mask with Patient? Yes,  "Adult Ambu Bag and Mask   Trach Supplies at Bedside Inner Cannula;Normal Saline Bullets;Suction Catheter;Trach Ties   Extra trach at bedside? Yes   Extra trach sizes at bedside? 6;8   Is Obturator Available? Yes   Location of Obturator?  Bedside table   Vent Select   Conventional Vent Y   $ Ventilator Subsequent 1   Charged w/in last 24h YES   Preset Conventional Ventilator Settings   Vent ID [id number]   Vent Type    Ventilation Type VC   Vent Mode A/C   Humidity HME   Set Rate 24 BPM   Vt Set 365 mL   PEEP/CPAP 8 cmH20   Peak Flow 45 L/min   Peak End Inspiratory Pressure 22 cmH20   I-Trigger Type  V-TRIG   Trigger Sensitivity Flow/I-Trigger 3 L/min   Patient Ventilator Parameters   Resp Rate Total 24 br/min   Peak Airway Pressure 25 cmH20   Mean Airway Pressure 14 cmH20   Plateau Pressure 27 cmH20   Exhaled Vt 320 mL   Total Ve 7.7 L/m   I:E Ratio Measured 1:1.80   Auto PEEP 4.5 cmH20   Tubing ID (mm) 8 mm   Tube Type Trach   Conventional Ventilator Alarms   Alarms On Y   Ve High Alarm 22 L/min   Ve Low Alarm 3 L/min   Vt High Alarm 1200 mL   Vt Low Alarm 200 mL   Resp Rate High Alarm 50 br/min   Press High Alarm 60 cmH2O   Apnea Rate 24   Apnea Volume (mL) 0 mL   Apnea Oxygen Concentration  100   Apnea Flow Rate (L/min) 55   T Apnea 20 sec(s)   Ready to Wean/Extubation Screen   FIO2<=50 (chart decimal) 0.4   MV<16L (chart vol.) 7.7   PEEP <=8 (chart #) 8   Ready to Wean Parameters   F/VT Ratio<105 (RSBI) (!) 84.38   Vital Capacity   Vital Capacity (mL) 0   Patient remains on  ventilator on settings posted. Chest film viewed and trach appears in good position "midline".   "

## 2023-08-05 NOTE — ASSESSMENT & PLAN NOTE
Continue p.r.n. metoprolol.  Monitor closely on telemetry.  Etiology likely related to pneumonia and hypoxemia

## 2023-08-05 NOTE — ASSESSMENT & PLAN NOTE
This patient has a cuffed tracheostomy in place. Trach size is- 8.0 and type- Shiley. I have not inspected the stoma. There are no signs of bleeding or infection. Standard tracheostomy care per nursing and respiratory according to institution standards. Patient placed on mechanical ventilation. Speaking valve is absent.

## 2023-08-05 NOTE — PROGRESS NOTES
[facility] Medicine  Progress Note    Patient Name: Genna Felix  MRN: [id number]  Patient Class: IP- Inpatient   Admission Date: 8/3/2023  Length of Stay: 2 days  Attending Physician: Tolu Cross MD  Primary Care Provider: Nikky, Primary Doctor        Subjective:     Principal Problem:Chronic respiratory failure        HPI:  Genna Felix is a 62 year old female with a past medical history of anoxic brain injury with quadriplegia, tracheostomy and G tube, PSVT, obesity, stage two sacral wound, HTN, anemia and right shoulder dislocation with recent admission for acute on chronic respiratory failure with CRP Pseudomonas VAP requiring new mechanical ventilation who presents back from [facility] as the patient has apparently been unable to complete Zerbaxa therapy as this antibiotic is not available at that facility. She will be admitted to the ICU and complete four more days of Zerbaxa per prior ID recommendations.      Overview/Hospital Course:  Genna Felix is a 62 year old female with a past medical history of anoxic brain injury s/p G tube and tracheostomy, chronic respiratory failure, PE, GERD, HTN, hepatic steatosis, obesity, and anemia who presented with severe sepsis with acute respiratory failure requiring mechanical ventilation. Respiratory cultures showed  Pseudomonas. ID has been consulted and started Avycaz. Pulmonary was also consulted for ventilator management. Her course was complicated by tracheostomy cuff leak; Pulmonary replaced the cuff 7/27. She also developed SVT for which scheduled metoprolol has been ordered. Cardiology was consulted. She also was noted to have a right shoulder dislocation on CTA for which Orthopedic Surgery has been consulted; no intervention recommended at this time. She has been unable to be weaned from mechanical ventilation at this time;. She was discharged with a five day course of Zerbaxa 8/1/2023 only to return 8/3/2023 as [facility] did  not have Zerbaxa to administer. She will complete the Zerbaxa course inpatient.      Interval History:  Patient seen and examined.  Significant temperature instability overnight.  Remains nonverbal and nonreactive.      Objective:     Vital Signs (Most Recent):  Temp: 98.5 °F (36.9 °C) (ishan hugger off) (08/05/23 1501)  Pulse: 68 (08/05/23 1615)  Resp: (!) 30 (08/05/23 1615)  BP: 106/60 (08/05/23 1501)  SpO2: 100 % (08/05/23 1615) Vital Signs (24h Range):  Temp:  [95 °F (35 °C)-101.2 °F (38.4 °C)] 98.5 °F (36.9 °C)  Pulse:  [] 68  Resp:  [21-35] 30  SpO2:  [97 %-100 %] 100 %  BP: ()/(45-69) 106/60     Weight: 95.4 kg (210 lb 5.1 oz)  Body mass index is 37.26 kg/m².    Intake/Output Summary (Last 24 hours) at 8/5/2023 1629  Last data filed at 8/5/2023 1455  Gross per 24 hour   Intake 270 ml   Output 345 ml   Net -75 ml         Physical Exam  Vitals and nursing note reviewed.   Constitutional:       General: She is not in acute distress.  Eyes:      Pupils: Pupils are equal, round, and reactive to light.   Neck:      Comments: Tracheostomy in place  Cardiovascular:      Rate and Rhythm: Normal rate and regular rhythm.      Heart sounds: No murmur heard.  Pulmonary:      Comments: Increased respiratory rate with coarse bilateral breath sounds noted  Abdominal:      General: There is no distension.      Palpations: Abdomen is soft.      Tenderness: There is no abdominal tenderness.      Comments: Percutaneous gastrostomy in place   Genitourinary:     Comments: Hamilton catheter in place  Skin:     Coloration: Skin is not pale.      Findings: No rash.   Neurological:      Mental Status: She is alert.      Comments: Nonverbal, nonreactive.  Significant spasticity noted.             Significant Labs: All pertinent labs within the past 24 hours have been reviewed.    Significant Imaging: I have reviewed all pertinent imaging results/findings within the past 24 hours.      Assessment/Plan:      * Chronic respiratory  failure  Patient with Hypoxic Respiratory failure which is Chronic.  she is on vent at chronic facility. Supplemental oxygen was provided and noted- Vent Mode: A/C  Oxygen Concentration (%):  [40] 40  Resp Rate Total:  [24 br/min-65 br/min] 32 br/min  Vt Set:  [365 mL] 365 mL  PEEP/CPAP:  [8 cmH20] 8 cmH20  Mean Airway Pressure:  [14 jxV60-98 cmH20] 18 cmH20    Contributing diagnoses includes - COPD and Pneumonia Labs and images were reviewed. Patient Has recent ABG, which has been reviewed. Will treat underlying causes and adjust management of respiratory failure as noted below    ABG  Recent Labs   Lab 08/01/23  0316   PH 7.455*   PO2 73*   PCO2 43.7   HCO3 30.7*   BE 7         Pneumonia of both lungs due to infectious organism  Patient with current diagnosis of pneumonia due to bacterial etiology due to  Pseudomonas which is uncontrolled due to persistent hypoxia . I have reviewed the pertinent imaging. Current antimicrobial regimen consists of   Antibiotics (From admission, onward)    Start     Stop Route Frequency Ordered    08/04/23 1115  ceftolozane-tazobactam (ZERBAXA) 3,000 mg in dextrose 5 % (D5W) 100 mL         08/08/23 0314 IV Every 8 hours (non-standard times) 08/04/23 0935    08/03/23 2145  mupirocin 2 % ointment         08/08/23 2059 Nasl 2 times daily 08/03/23 2033      . Cultures drawn and noted-   Microbiology Results (last 7 days)     Procedure Component Value Units Date/Time    Culture, Respiratory with Gram Stain [[id number]] Collected: 08/03/23 1831    Order Status: Completed Specimen: Respiratory from Endotracheal Aspirate Updated: 08/05/23 1013     Respiratory Culture Insufficient incubation, culture in progress      No Growth     Gram Stain (Respiratory) <10 epithelial cells per low power field.     Gram Stain (Respiratory) Rare WBC's     Gram Stain (Respiratory) No organisms seen         Will monitor patient closely and continue current treatment plan unchanged.  Will continue for 5 days of  treatment with Zerbaxa finishing on 08/08.      Multiple drug resistant organism (MDRO) culture positive  -Zerbaxa for a five day course for  Pseudomonas      Anemia of chronic disease  Patient's anemia is currently controlled. Has not received any PRBCs to date.. Etiology likely d/t multifactorial  Current CBC reviewed-   Lab Results   Component Value Date    HGB 8.2 (L) 08/05/2023    HCT 28.1 (L) 08/05/2023     Monitor serial CBC and transfuse if patient becomes hemodynamically unstable, symptomatic or H/H drops below 7/21.         Functional quadriplegia  Patient with Chronic debility due to functional quadraplegia. Latest AMPAC and GEMS scores have been reviewed. Evaluation for etiology is complete. Plan includes progressive mobility protocol initated and fall precautions in place.      Paroxysmal SVT (supraventricular tachycardia)  Continue p.r.n. metoprolol.  Monitor closely on telemetry.  Etiology likely related to pneumonia and hypoxemia      Hepatic steatosis  Chronic, controlled.  Monitor LFTs.  Last LFTs reviewed.   Lab Results   Component Value Date    ALT 22 08/05/2023    ALT 22 08/05/2023    AST 24 08/05/2023    AST 24 08/05/2023    ALKPHOS 128 08/05/2023    ALKPHOS 128 08/05/2023    BILITOT 0.8 08/05/2023    BILITOT 0.8 08/05/2023           Obesity (BMI 30-39.9)  Body mass index is 37.26 kg/m². Morbid obesity complicates all aspects of disease management from diagnostic modalities to treatment.         Pressure injury of buttock, stage 2  Noted, wound care consulted.      Wounds, multiple  Noted, wound care consulted.      Difficult Hamilton catheter placement  -Hamilton in place      Essential hypertension  Chronic, controlled.  Latest blood pressure and vitals reviewed-     Temp:  [95 °F (35 °C)-101.2 °F (38.4 °C)]   Pulse:  []   Resp:  [21-35]   BP: ()/(45-69)   SpO2:  [97 %-100 %] .   Home meds for hypertension were reviewed and noted below-  Hypertension Medications              metoprolol tartrate (LOPRESSOR) 50 MG tablet 0.5 tablets (25 mg total) by Per G Tube route 2 (two) times daily.          While in the hospital, will manage blood pressure as follows; Continue home antihypertensive regimen    Will utilize p.r.n. blood pressure medication only if patient's blood pressure greater than 180/110 and she develops symptoms such as worsening chest pain or shortness of breath.      PEG (percutaneous endoscopic gastrostomy) status  Patient noted to have a percutaneous endoscopic gastrostomy tube in place. I have personally inspected the tube.Tube was placed prior to this admission There are no signs of drainage or infection around the site. The tube is patent. Medications have converted to liquid form if available.  Routine care to be done by wound care and nursing staff.           Tracheostomy dependence  This patient has a cuffed tracheostomy in place. Trach size is- 8.0 and type- Shiley. I have not inspected the stoma. There are no signs of bleeding or infection. Standard tracheostomy care per nursing and respiratory according to institution standards. Patient placed on mechanical ventilation. Speaking valve is absent.          Dislocation of right shoulder joint  -Ortho consulted; no intervention at this time    Persistent vegetative state    Noted, patient currently is a full code.  Will have advanced care planning conversation with her family once available.      VTE Risk Mitigation (From admission, onward)         Ordered     enoxaparin injection 40 mg  Daily         08/04/23 0935     Place sequential compression device  Until discontinued         08/04/23 0935     IP VTE HIGH RISK PATIENT  Once         08/03/23 2028                Discharge Planning   [place]: 8/7/2023     Code Status: Full Code   Is the patient medically ready for discharge?:     Reason for patient still in hospital (select all that apply): Treatment  Discharge Plan A: Return to nursing home                  Tolu Cross,  MD  Department of Hospital Medicine   [facility]

## 2023-08-05 NOTE — PLAN OF CARE
Pt alert on vent. Tube feedings running at goal of 30 ml/hr with residual below 30 ml. Pt with increased urine output during shift. Pt given night bath with wound care completed and checked. See flow sheets for further assessment and vital sign data.   Problem: Adult Inpatient Plan of Care  Goal: Plan of Care Review  Outcome: Ongoing, Progressing  Goal: Patient-Specific Goal (Individualized)  Outcome: Ongoing, Progressing  Goal: Absence of Hospital-Acquired Illness or Injury  Outcome: Ongoing, Progressing  Goal: Optimal Comfort and Wellbeing  Outcome: Ongoing, Progressing  Goal: Readiness for Transition of Care  Outcome: Ongoing, Progressing     Problem: Infection (Pneumonia)  Goal: Resolution of Infection Signs and Symptoms  Outcome: Ongoing, Progressing

## 2023-08-05 NOTE — ASSESSMENT & PLAN NOTE
Patient noted to have a percutaneous endoscopic gastrostomy tube in place. I have personally inspected the tube.Tube was placed prior to this admission There are no signs of drainage or infection around the site. The tube is patent. Medications have converted to liquid form if available.  Routine care to be done by wound care and nursing staff.

## 2023-08-05 NOTE — ASSESSMENT & PLAN NOTE
Chronic, controlled.  Monitor LFTs.  Last LFTs reviewed.   Lab Results   Component Value Date    ALT 22 08/05/2023    ALT 22 08/05/2023    AST 24 08/05/2023    AST 24 08/05/2023    ALKPHOS 128 08/05/2023    ALKPHOS 128 08/05/2023    BILITOT 0.8 08/05/2023    BILITOT 0.8 08/05/2023

## 2023-08-06 PROBLEM — G90.9 AUTONOMIC INSTABILITY: Status: ACTIVE | Noted: 2023-08-06

## 2023-08-06 LAB
ALBUMIN SERPL BCP-MCNC: 2.9 G/DL (ref 3.5–5.2)
ALBUMIN SERPL BCP-MCNC: 2.9 G/DL (ref 3.5–5.2)
ALP SERPL-CCNC: 120 U/L (ref 55–135)
ALP SERPL-CCNC: 120 U/L (ref 55–135)
ALT SERPL W/O P-5'-P-CCNC: 19 U/L (ref 10–44)
ALT SERPL W/O P-5'-P-CCNC: 19 U/L (ref 10–44)
ANION GAP SERPL CALC-SCNC: 1 MMOL/L (ref 8–16)
ANION GAP SERPL CALC-SCNC: 1 MMOL/L (ref 8–16)
AST SERPL-CCNC: 17 U/L (ref 10–40)
AST SERPL-CCNC: 17 U/L (ref 10–40)
BACTERIA SPEC AEROBE CULT: NORMAL
BASOPHILS # BLD AUTO: 0.03 K/UL (ref 0–0.2)
BASOPHILS NFR BLD: 0.4 % (ref 0–1.9)
BILIRUB SERPL-MCNC: 0.8 MG/DL (ref 0.1–1)
BILIRUB SERPL-MCNC: 0.8 MG/DL (ref 0.1–1)
BUN SERPL-MCNC: 24 MG/DL (ref 6–20)
BUN SERPL-MCNC: 24 MG/DL (ref 6–20)
CALCIUM SERPL-MCNC: 8.7 MG/DL (ref 8.7–10.5)
CALCIUM SERPL-MCNC: 8.7 MG/DL (ref 8.7–10.5)
CHLORIDE SERPL-SCNC: 111 MMOL/L (ref 95–110)
CHLORIDE SERPL-SCNC: 111 MMOL/L (ref 95–110)
CO2 SERPL-SCNC: 27 MMOL/L (ref 23–29)
CO2 SERPL-SCNC: 27 MMOL/L (ref 23–29)
CREAT SERPL-MCNC: 0.3 MG/DL (ref 0.5–1.4)
CREAT SERPL-MCNC: 0.3 MG/DL (ref 0.5–1.4)
DIFFERENTIAL METHOD: ABNORMAL
EOSINOPHIL # BLD AUTO: 0.3 K/UL (ref 0–0.5)
EOSINOPHIL NFR BLD: 3.5 % (ref 0–8)
ERYTHROCYTE [DISTWIDTH] IN BLOOD BY AUTOMATED COUNT: 21.1 % (ref 11.5–14.5)
EST. GFR  (NO RACE VARIABLE): >60 ML/MIN/1.73 M^2
EST. GFR  (NO RACE VARIABLE): >60 ML/MIN/1.73 M^2
GLUCOSE SERPL-MCNC: 94 MG/DL (ref 70–110)
GLUCOSE SERPL-MCNC: 94 MG/DL (ref 70–110)
GRAM STN SPEC: NORMAL
HCT VFR BLD AUTO: 24.5 % (ref 37–48.5)
HGB BLD-MCNC: 7.3 G/DL (ref 12–16)
IMM GRANULOCYTES # BLD AUTO: 0.03 K/UL (ref 0–0.04)
IMM GRANULOCYTES NFR BLD AUTO: 0.4 % (ref 0–0.5)
LYMPHOCYTES # BLD AUTO: 2.2 K/UL (ref 1–4.8)
LYMPHOCYTES NFR BLD: 29.6 % (ref 18–48)
MAGNESIUM SERPL-MCNC: 2.2 MG/DL (ref 1.6–2.6)
MCH RBC QN AUTO: 26.7 PG (ref 27–31)
MCHC RBC AUTO-ENTMCNC: 29.8 G/DL (ref 32–36)
MCV RBC AUTO: 90 FL (ref 82–98)
MONOCYTES # BLD AUTO: 0.6 K/UL (ref 0.3–1)
MONOCYTES NFR BLD: 8.6 % (ref 4–15)
NEUTROPHILS # BLD AUTO: 4.3 K/UL (ref 1.8–7.7)
NEUTROPHILS NFR BLD: 57.5 % (ref 38–73)
NRBC BLD-RTO: 0 /100 WBC
PHOSPHATE SERPL-MCNC: 3.3 MG/DL (ref 2.7–4.5)
PLATELET # BLD AUTO: 360 K/UL (ref 150–450)
PMV BLD AUTO: 10.8 FL (ref 9.2–12.9)
POTASSIUM SERPL-SCNC: 3.6 MMOL/L (ref 3.5–5.1)
POTASSIUM SERPL-SCNC: 3.6 MMOL/L (ref 3.5–5.1)
PROT SERPL-MCNC: 9 G/DL (ref 6–8.4)
PROT SERPL-MCNC: 9 G/DL (ref 6–8.4)
RBC # BLD AUTO: 2.73 M/UL (ref 4–5.4)
SODIUM SERPL-SCNC: 139 MMOL/L (ref 136–145)
SODIUM SERPL-SCNC: 139 MMOL/L (ref 136–145)
WBC # BLD AUTO: 7.42 K/UL (ref 3.9–12.7)

## 2023-08-06 PROCEDURE — 51798 US URINE CAPACITY MEASURE: CPT

## 2023-08-06 PROCEDURE — 99900035 HC TECH TIME PER 15 MIN (STAT)

## 2023-08-06 PROCEDURE — 94761 N-INVAS EAR/PLS OXIMETRY MLT: CPT

## 2023-08-06 PROCEDURE — 63600175 PHARM REV CODE 636 W HCPCS: Mod: JZ,JG | Performed by: STUDENT IN AN ORGANIZED HEALTH CARE EDUCATION/TRAINING PROGRAM

## 2023-08-06 PROCEDURE — 85025 COMPLETE CBC W/AUTO DIFF WBC: CPT | Performed by: STUDENT IN AN ORGANIZED HEALTH CARE EDUCATION/TRAINING PROGRAM

## 2023-08-06 PROCEDURE — 25000242 PHARM REV CODE 250 ALT 637 W/ HCPCS: Performed by: STUDENT IN AN ORGANIZED HEALTH CARE EDUCATION/TRAINING PROGRAM

## 2023-08-06 PROCEDURE — 25000003 PHARM REV CODE 250: Performed by: HOSPITALIST

## 2023-08-06 PROCEDURE — 27000221 HC OXYGEN, UP TO 24 HOURS

## 2023-08-06 PROCEDURE — 99900026 HC AIRWAY MAINTENANCE (STAT)

## 2023-08-06 PROCEDURE — 84100 ASSAY OF PHOSPHORUS: CPT | Performed by: STUDENT IN AN ORGANIZED HEALTH CARE EDUCATION/TRAINING PROGRAM

## 2023-08-06 PROCEDURE — 94640 AIRWAY INHALATION TREATMENT: CPT

## 2023-08-06 PROCEDURE — 20000000 HC ICU ROOM

## 2023-08-06 PROCEDURE — 99900031 HC PATIENT EDUCATION (STAT)

## 2023-08-06 PROCEDURE — 94003 VENT MGMT INPAT SUBQ DAY: CPT

## 2023-08-06 PROCEDURE — 80053 COMPREHEN METABOLIC PANEL: CPT | Performed by: STUDENT IN AN ORGANIZED HEALTH CARE EDUCATION/TRAINING PROGRAM

## 2023-08-06 PROCEDURE — 25000003 PHARM REV CODE 250: Performed by: STUDENT IN AN ORGANIZED HEALTH CARE EDUCATION/TRAINING PROGRAM

## 2023-08-06 PROCEDURE — 83735 ASSAY OF MAGNESIUM: CPT | Performed by: STUDENT IN AN ORGANIZED HEALTH CARE EDUCATION/TRAINING PROGRAM

## 2023-08-06 PROCEDURE — 36415 COLL VENOUS BLD VENIPUNCTURE: CPT | Performed by: STUDENT IN AN ORGANIZED HEALTH CARE EDUCATION/TRAINING PROGRAM

## 2023-08-06 RX ORDER — METOPROLOL TARTRATE 25 MG/1
12.5 TABLET ORAL 2 TIMES DAILY
Status: DISCONTINUED | OUTPATIENT
Start: 2023-08-06 | End: 2023-08-08 | Stop reason: HOSPADM

## 2023-08-06 RX ADMIN — ENOXAPARIN SODIUM 40 MG: 100 INJECTION SUBCUTANEOUS at 04:08

## 2023-08-06 RX ADMIN — MICONAZOLE NITRATE: 20 CREAM TOPICAL at 08:08

## 2023-08-06 RX ADMIN — FAMOTIDINE 20 MG: 10 INJECTION INTRAVENOUS at 08:08

## 2023-08-06 RX ADMIN — POTASSIUM BICARBONATE 50 MEQ: 977.5 TABLET, EFFERVESCENT ORAL at 05:08

## 2023-08-06 RX ADMIN — Medication 4000 UNITS: at 08:08

## 2023-08-06 RX ADMIN — LEVALBUTEROL HYDROCHLORIDE 1.25 MG: 1.25 SOLUTION RESPIRATORY (INHALATION) at 12:08

## 2023-08-06 RX ADMIN — MUPIROCIN 1 G: 20 OINTMENT TOPICAL at 08:08

## 2023-08-06 RX ADMIN — CARBOXYMETHYLCELLULOSE SODIUM 1 DROP: 5 SOLUTION/ DROPS OPHTHALMIC at 08:08

## 2023-08-06 RX ADMIN — MIDODRINE HYDROCHLORIDE 5 MG: 2.5 TABLET ORAL at 06:08

## 2023-08-06 RX ADMIN — LEVALBUTEROL HYDROCHLORIDE 1.25 MG: 1.25 SOLUTION RESPIRATORY (INHALATION) at 08:08

## 2023-08-06 RX ADMIN — MIDODRINE HYDROCHLORIDE 5 MG: 2.5 TABLET ORAL at 04:08

## 2023-08-06 RX ADMIN — CARBOXYMETHYLCELLULOSE SODIUM 1 DROP: 5 SOLUTION/ DROPS OPHTHALMIC at 01:08

## 2023-08-06 RX ADMIN — MIDODRINE HYDROCHLORIDE 5 MG: 2.5 TABLET ORAL at 11:08

## 2023-08-06 RX ADMIN — CEFTOLOZANE AND TAZOBACTAM 3000 MG: 1; .5 INJECTION, POWDER, LYOPHILIZED, FOR SOLUTION INTRAVENOUS at 03:08

## 2023-08-06 RX ADMIN — METOPROLOL TARTRATE 12.5 MG: 25 TABLET, FILM COATED ORAL at 08:08

## 2023-08-06 RX ADMIN — CEFTOLOZANE AND TAZOBACTAM 3000 MG: 1; .5 INJECTION, POWDER, LYOPHILIZED, FOR SOLUTION INTRAVENOUS at 11:08

## 2023-08-06 RX ADMIN — CARBOXYMETHYLCELLULOSE SODIUM 1 DROP: 5 SOLUTION/ DROPS OPHTHALMIC at 04:08

## 2023-08-06 RX ADMIN — CEFTOLOZANE AND TAZOBACTAM 3000 MG: 1; .5 INJECTION, POWDER, LYOPHILIZED, FOR SOLUTION INTRAVENOUS at 08:08

## 2023-08-06 RX ADMIN — LEVALBUTEROL HYDROCHLORIDE 1.25 MG: 1.25 SOLUTION RESPIRATORY (INHALATION) at 03:08

## 2023-08-06 NOTE — RESPIRATORY THERAPY
08/05/23 1936   Patient Assessment/Suction   Level of Consciousness (AVPU) responds to voice   Respiratory Effort Unlabored   Expansion/Accessory Muscles/Retractions expansion symmetric;no use of accessory muscles   All Lung Fields Breath Sounds Anterior:;Lateral:;diminished;rhonchi   MAYNOR Breath Sounds rhonchi   LLL Breath Sounds diminished   RUL Breath Sounds rhonchi;diminished   RML Breath Sounds diminished   RLL Breath Sounds diminished   Rhythm/Pattern, Respiratory assisted mechanically   Cough Frequency with stimulation   Cough Type assisted   Suction Method tracheal   $ Suction Charges Inline Suction Procedure Stat Charge   Secretions Amount small   Secretions Color white   Secretions Characteristics thick   Skin Integrity   $ Wound Care Tech Time 15 min   Area Observed Neck;Neck under tracheostomy   Skin Appearance redness blanchable   Barrier used? Other (see comments)  (drain sponge)   PRE-TX-O2   Device (Oxygen Therapy) ventilator   $ Is the patient on Low Flow Oxygen? Yes   Oxygen Concentration (%) 40   SpO2 98 %   Pulse Oximetry Type Continuous   $ Pulse Oximetry - Multiple Charge Pulse Oximetry - Multiple   Pulse 66   Resp (!) 28   Adult Surgical Airway 07/27/23 1814 Shiley 8.0 mm DIC cuffed Extra Large Cuffed Distal 8.0 / 85 mm 8.0   Placement Date/Time: 07/27/23 1814   Present Prior to Hospital Arrival?: No  Inserted by: MD  Placed By: ICU physician  Type: Tracheostomy  Brand: Shiley  Airway Device Size and Style: 8.0 mm DIC cuffed  Airway Device Style: Extra Large Cuffed Distal ...   Cuff Pressure   (MLT)   Cuff Inflation? Inflated   Speaking Valve Usage Not wearing   Status Secured   Site Assessment Oozing secretions   Site Care Cleansed;Dried   Airway Safety   Is Ambu Bag and Mask with Patient? Yes, Adult Ambu Bag and Mask   Trach Supplies at Bedside Inner Cannula;Normal Saline Bullets;Trach Ties;Suction Catheter   Suction set is at the bedside? Yes   Extra trach at bedside? Yes   Extra trach  sizes at bedside? 6;8   Is Obturator Available? Yes   Location of Obturator?  Bedside table   Vent Select   Conventional Vent Y   Charged w/in last 24h YES   Preset Conventional Ventilator Settings   Vent ID [id number]   Vent Type    Ventilation Type VC   Vent Mode A/C   Humidity HME   Set Rate 24 BPM   Vt Set 365 mL   PEEP/CPAP 8 cmH20   Waveform RAMP   Peak Flow 45 L/min   Peak End Inspiratory Pressure 31 cmH20   I-Trigger Type  V-TRIG   Trigger Sensitivity Flow/I-Trigger 3 L/min   Patient Ventilator Parameters   Resp Rate Total 28 br/min   Peak Airway Pressure 32 cmH20   Mean Airway Pressure 15 cmH20   Plateau Pressure 27 cmH20   Exhaled Vt 199 mL   Total Ve 9 L/m   I:E Ratio Measured 1.20:1   Auto PEEP 4.5 cmH20   Total PEEP 8.1 cmH20   Tubing ID (mm) 8 mm   Tube Type Trach   Conventional Ventilator Alarms   Alarms On Y   Ve High Alarm 22 L/min   Ve Low Alarm 3 L/min   Vt High Alarm 1200 mL   Vt Low Alarm 200 mL   Resp Rate High Alarm 50 br/min   Press High Alarm 60 cmH2O   Apnea Rate 24   Apnea Volume (mL) 0 mL   Apnea Oxygen Concentration  100   Apnea Flow Rate (L/min) 55   T Apnea 20 sec(s)   IHI Ventilator Associated Pneumonia Bundle (Required)   Oral Care Mouth moisturizer;Mouth suctioned   Ready to Wean/Extubation Screen   FIO2<=50 (chart decimal) 0.4   MV<16L (chart vol.) 9   PEEP <=8 (chart #) 8   Ready to Wean Parameters   F/VT Ratio<105 (RSBI) 140.7   Vital Capacity   Vital Capacity (mL) 0     Pt receives Q8 Xopenex 1.25mg treatments. Trach care BID. ABG PRN

## 2023-08-06 NOTE — ASSESSMENT & PLAN NOTE
Noted rapid swings and blood pressure and temperature, indicative likely severe anoxic brain injury.  No need for acute management at this time.

## 2023-08-06 NOTE — PLAN OF CARE
Pt's carroll somehow came out inadvertently, left carroll out and placed purewick.    Problem: Adult Inpatient Plan of Care  Goal: Plan of Care Review  Outcome: Ongoing, Progressing  Goal: Patient-Specific Goal (Individualized)  Outcome: Ongoing, Progressing  Goal: Absence of Hospital-Acquired Illness or Injury  Outcome: Ongoing, Progressing  Goal: Optimal Comfort and Wellbeing  Outcome: Ongoing, Progressing  Goal: Readiness for Transition of Care  Outcome: Ongoing, Progressing     Problem: Bariatric Environmental Safety  Goal: Safety Maintained with Care  Outcome: Ongoing, Progressing     Problem: Fluid Imbalance (Pneumonia)  Goal: Fluid Balance  Outcome: Ongoing, Progressing     Problem: Infection (Pneumonia)  Goal: Resolution of Infection Signs and Symptoms  Outcome: Ongoing, Progressing     Problem: Respiratory Compromise (Pneumonia)  Goal: Effective Oxygenation and Ventilation  Outcome: Ongoing, Progressing     Problem: Infection  Goal: Absence of Infection Signs and Symptoms  Outcome: Ongoing, Progressing     Problem: Impaired Wound Healing  Goal: Optimal Wound Healing  Outcome: Ongoing, Progressing     Problem: Communication Impairment (Mechanical Ventilation, Invasive)  Goal: Effective Communication  Outcome: Ongoing, Progressing     Problem: Device-Related Complication Risk (Mechanical Ventilation, Invasive)  Goal: Optimal Device Function  Outcome: Ongoing, Progressing     Problem: Inability to Wean (Mechanical Ventilation, Invasive)  Goal: Mechanical Ventilation Liberation  Outcome: Ongoing, Progressing     Problem: Nutrition Impairment (Mechanical Ventilation, Invasive)  Goal: Optimal Nutrition Delivery  Outcome: Ongoing, Progressing     Problem: Skin and Tissue Injury (Mechanical Ventilation, Invasive)  Goal: Absence of Device-Related Skin and Tissue Injury  Outcome: Ongoing, Progressing     Problem: Ventilator-Induced Lung Injury (Mechanical Ventilation, Invasive)  Goal: Absence of Ventilator-Induced Lung  Injury  Outcome: Ongoing, Progressing     Problem: Communication Impairment (Artificial Airway)  Goal: Effective Communication  Outcome: Ongoing, Progressing     Problem: Device-Related Complication Risk (Artificial Airway)  Goal: Optimal Device Function  Outcome: Ongoing, Progressing     Problem: Skin and Tissue Injury (Artificial Airway)  Goal: Absence of Device-Related Skin or Tissue Injury  Outcome: Ongoing, Progressing     Problem: Noninvasive Ventilation Acute  Goal: Effective Unassisted Ventilation and Oxygenation  Outcome: Ongoing, Progressing     Problem: Skin Injury Risk Increased  Goal: Skin Health and Integrity  Outcome: Ongoing, Progressing     Problem: Aspiration (Enteral Nutrition)  Goal: Absence of Aspiration Signs and Symptoms  Outcome: Ongoing, Progressing     Problem: Device-Related Complication Risk (Enteral Nutrition)  Goal: Safe, Effective Therapy Delivery  Outcome: Ongoing, Progressing     Problem: Feeding Intolerance (Enteral Nutrition)  Goal: Feeding Tolerance  Outcome: Ongoing, Progressing     Problem: Fall Injury Risk  Goal: Absence of Fall and Fall-Related Injury  Outcome: Ongoing, Progressing

## 2023-08-06 NOTE — PLAN OF CARE
Problem: Adult Inpatient Plan of Care  Goal: Plan of Care Review  Outcome: Ongoing, Progressing  Goal: Patient-Specific Goal (Individualized)  Outcome: Ongoing, Progressing  Goal: Absence of Hospital-Acquired Illness or Injury  Outcome: Ongoing, Progressing  Goal: Optimal Comfort and Wellbeing  Outcome: Ongoing, Progressing  Goal: Readiness for Transition of Care  Outcome: Ongoing, Progressing     Problem: Bariatric Environmental Safety  Goal: Safety Maintained with Care  Outcome: Ongoing, Progressing     Problem: Fluid Imbalance (Pneumonia)  Goal: Fluid Balance  Outcome: Ongoing, Progressing     Problem: Infection (Pneumonia)  Goal: Resolution of Infection Signs and Symptoms  Outcome: Ongoing, Progressing     Problem: Respiratory Compromise (Pneumonia)  Goal: Effective Oxygenation and Ventilation  Outcome: Ongoing, Progressing     Problem: Infection  Goal: Absence of Infection Signs and Symptoms  Outcome: Ongoing, Progressing     Problem: Impaired Wound Healing  Goal: Optimal Wound Healing  Outcome: Ongoing, Progressing     Problem: Communication Impairment (Mechanical Ventilation, Invasive)  Goal: Effective Communication  Outcome: Ongoing, Progressing     Problem: Device-Related Complication Risk (Mechanical Ventilation, Invasive)  Goal: Optimal Device Function  Outcome: Ongoing, Progressing     Problem: Inability to Wean (Mechanical Ventilation, Invasive)  Goal: Mechanical Ventilation Liberation  Outcome: Ongoing, Progressing     Problem: Nutrition Impairment (Mechanical Ventilation, Invasive)  Goal: Optimal Nutrition Delivery  Outcome: Ongoing, Progressing     Problem: Skin and Tissue Injury (Mechanical Ventilation, Invasive)  Goal: Absence of Device-Related Skin and Tissue Injury  Outcome: Ongoing, Progressing     Problem: Ventilator-Induced Lung Injury (Mechanical Ventilation, Invasive)  Goal: Absence of Ventilator-Induced Lung Injury  Outcome: Ongoing, Progressing     Problem: Communication Impairment  (Artificial Airway)  Goal: Effective Communication  Outcome: Ongoing, Progressing     Problem: Device-Related Complication Risk (Artificial Airway)  Goal: Optimal Device Function  Outcome: Ongoing, Progressing

## 2023-08-06 NOTE — NURSING
Pt lying in bed in no acute distress. Repositioned pt. See ongoing assessment and charted vital signs in epic. See telemetry strip in chart. IV drips infusing per md order. Verified ventilator settings against md order. Tube feedings infusing without complications. Pt opens eyes to voice. PERRLA. Facial symmetry. Withdraws/flexes to pain. Bilateral upper and lower extremities contracted. Philip present at bedside for trach. Pt resing quietly with no indications of pain. CHG bath performed. Bed alarm on. Will continue to monitor.

## 2023-08-06 NOTE — ASSESSMENT & PLAN NOTE
Patient with current diagnosis of pneumonia due to bacterial etiology due to  Pseudomonas which is uncontrolled due to persistent hypoxia . I have reviewed the pertinent imaging. Current antimicrobial regimen consists of   Antibiotics (From admission, onward)    Start     Stop Route Frequency Ordered    08/04/23 1115  ceftolozane-tazobactam (ZERBAXA) 3,000 mg in dextrose 5 % (D5W) 100 mL         08/08/23 0314 IV Every 8 hours (non-standard times) 08/04/23 0935    08/03/23 2145  mupirocin 2 % ointment         08/08/23 2059 Nasl 2 times daily 08/03/23 2033      . Cultures drawn and noted-   Microbiology Results (last 7 days)     Procedure Component Value Units Date/Time    Culture, Respiratory with Gram Stain [[id number]] Collected: 08/03/23 1831    Order Status: Completed Specimen: Respiratory from Endotracheal Aspirate Updated: 08/06/23 0825     Respiratory Culture Reduced normal respiratory kenny     Gram Stain (Respiratory) <10 epithelial cells per low power field.     Gram Stain (Respiratory) Rare WBC's     Gram Stain (Respiratory) No organisms seen         Will monitor patient closely and continue current treatment plan unchanged.  Will continue for 5 days of treatment with Zerbaxa finishing on 08/08.

## 2023-08-06 NOTE — ASSESSMENT & PLAN NOTE
Chronic, controlled.  Monitor LFTs.  Last LFTs reviewed.   Lab Results   Component Value Date    ALT 19 08/06/2023    ALT 19 08/06/2023    AST 17 08/06/2023    AST 17 08/06/2023    ALKPHOS 120 08/06/2023    ALKPHOS 120 08/06/2023    BILITOT 0.8 08/06/2023    BILITOT 0.8 08/06/2023

## 2023-08-06 NOTE — ASSESSMENT & PLAN NOTE
Patient's anemia is currently controlled. Has not received any PRBCs to date.. Etiology likely d/t multifactorial  Current CBC reviewed-   Lab Results   Component Value Date    HGB 7.3 (L) 08/06/2023    HCT 24.5 (L) 08/06/2023     Monitor serial CBC and transfuse if patient becomes hemodynamically unstable, symptomatic or H/H drops below 7/21.

## 2023-08-06 NOTE — ASSESSMENT & PLAN NOTE
Patient with Hypoxic Respiratory failure which is Chronic.  she is on vent at chronic facility. Supplemental oxygen was provided and noted- Vent Mode: A/C  Oxygen Concentration (%):  [40] 40  Resp Rate Total:  [24 br/min-35 br/min] 25 br/min  Vt Set:  [365 mL] 365 mL  PEEP/CPAP:  [8 cmH20] 8 cmH20  Mean Airway Pressure:  [15 ocI68-32 cmH20] 16 cmH20    Contributing diagnoses includes - COPD and Pneumonia Labs and images were reviewed. Patient Has recent ABG, which has been reviewed. Will treat underlying causes and adjust management of respiratory failure as noted below    ABG  Recent Labs   Lab 08/01/23  0316   PH 7.455*   PO2 73*   PCO2 43.7   HCO3 30.7*   BE 7

## 2023-08-06 NOTE — CARE UPDATE
08/06/23 0812   Patient Assessment/Suction   Level of Consciousness (AVPU) responds to pain   Respiratory Effort Normal;Unlabored   Expansion/Accessory Muscles/Retractions expansion symmetric;no retractions;no use of accessory muscles   All Lung Fields Breath Sounds coarse;rhonchi   Rhythm/Pattern, Respiratory assisted mechanically   Cough Frequency with stimulation   Cough Type assisted   Suction Method tracheal;oral   Suction Pressure (mmHg) -120 mmHg   $ Suction Charges Inline Suction Procedure Stat Charge   Secretions Amount small   Secretions Color white   Secretions Characteristics frothy   Skin Integrity   $ Wound Care Tech Time 15 min   Area Observed Neck;Neck under tracheostomy   Skin Appearance redness blanchable   Barrier used?   (split gauze)   PRE-TX-O2   Device (Oxygen Therapy) ventilator   $ Is the patient on Low Flow Oxygen? Yes   SpO2 100 %   Pulse Oximetry Type Continuous   $ Pulse Oximetry - Multiple Charge Pulse Oximetry - Multiple   Pulse 65   Resp (!) 25   Aerosol Therapy   $ Aerosol Therapy Charges Aerosol Treatment   Daily Review of Necessity (SVN) completed   Respiratory Treatment Status (SVN) given   Treatment Route (SVN) in-line;ventilator;tracheostomy   Patient Position (SVN) semi-Le's   Post Treatment Assessment (SVN) breath sounds unchanged   Signs of Intolerance (SVN) none   Breath Sounds Post-Respiratory Treatment   Throughout All Fields Post-Treatment All Fields   Throughout All Fields Post-Treatment no change   Post-treatment Heart Rate (beats/min) 63   Post-treatment Resp Rate (breaths/min) 26   Adult Surgical Airway 07/27/23 1814 Shiley 8.0 mm DIC cuffed Extra Large Cuffed Distal 8.0 / 85 mm 8.0   Placement Date/Time: 07/27/23 1814   Present Prior to Hospital Arrival?: No  Inserted by: MD  Placed By: ICU physician  Type: Tracheostomy  Brand: Shiley  Airway Device Size and Style: 8.0 mm DIC cuffed  Airway Device Style: Extra Large Cuffed Distal ...   Cuff Pressure   (MLT,  GREEN ZONE)   Cuff Inflation? Inflated   Speaking Valve Usage Not wearing   Status Secured   Site Assessment Clean;Dry;No bleeding;No drainage   Ties Assessment Clean;Dry;Intact;Secure   Airway Safety   Is Ambu Bag and Mask with Patient? Yes, Adult Ambu Bag and Mask   Trach Supplies at Bedside Inner Cannula;Normal Saline Bullets;Trach Ties;10cc Syringes;Obturator;Ambu Bag and Mask (in patients room)   Suction set is at the bedside? Yes   Extra trach at bedside? Yes   Extra trach sizes at bedside? 6;8   Is Obturator Available? Yes   Location of Obturator?  Bedside table   Vent Select   Conventional Vent Y   $ Ventilator Subsequent 1   Charged w/in last 24h YES   Preset Conventional Ventilator Settings   Vent ID [id number]   Vent Type    Humidity HME   Patient Ventilator Parameters   Tubing ID (mm)   ([id number])   Tube Type Trach   Conventional Ventilator Alarms   Alarms On Y   Ve High Alarm 22 L/min   Ve Low Alarm 3 L/min   Vt High Alarm 1200 mL   Vt Low Alarm 200 mL   Education   $ Education Suction;Ventilator Oxygen;15 min

## 2023-08-06 NOTE — ASSESSMENT & PLAN NOTE
Chronic, controlled.  Latest blood pressure and vitals reviewed-     Temp:  [94.5 °F (34.7 °C)-98.8 °F (37.1 °C)]   Pulse:  [53-77]   Resp:  [11-37]   BP: ()/(53-75)   SpO2:  [96 %-100 %] .   Home meds for hypertension were reviewed and noted below-  Hypertension Medications             metoprolol tartrate (LOPRESSOR) 50 MG tablet 0.5 tablets (25 mg total) by Per G Tube route 2 (two) times daily.          While in the hospital, will manage blood pressure as follows; Continue home antihypertensive regimen    Will utilize p.r.n. blood pressure medication only if patient's blood pressure greater than 180/110 and she develops symptoms such as worsening chest pain or shortness of breath.

## 2023-08-06 NOTE — ASSESSMENT & PLAN NOTE
Body mass index is 33.78 kg/m². Morbid obesity complicates all aspects of disease management from diagnostic modalities to treatment.

## 2023-08-06 NOTE — PROGRESS NOTES
[facility]  Progress Note    Patient Name: Genna Felix  MRN: [id number]  Patient Class: IP- Inpatient   Admission Date: 8/3/2023  Length of Stay: 3 days  Attending Physician: Tolu Cross MD  Primary Care Provider: Nikky, Primary Doctor        Subjective:     Principal Problem:Chronic respiratory failure        HPI:  Genna Felix is a 62 year old female with a past medical history of anoxic brain injury with quadriplegia, tracheostomy and G tube, PSVT, obesity, stage two sacral wound, HTN, anemia and right shoulder dislocation with recent admission for acute on chronic respiratory failure with CRP Pseudomonas VAP requiring new mechanical ventilation who presents back from [facility] as the patient has apparently been unable to complete Zerbaxa therapy as this antibiotic is not available at that facility. She will be admitted to the ICU and complete four more days of Zerbaxa per prior ID recommendations.      Overview/Hospital Course:  Genna Felix is a 62 year old female with a past medical history of anoxic brain injury s/p G tube and tracheostomy, chronic respiratory failure, PE, GERD, HTN, hepatic steatosis, obesity, and anemia who presented with severe sepsis with acute respiratory failure requiring mechanical ventilation. Respiratory cultures showed  Pseudomonas. ID has been consulted and started Avycaz. Pulmonary was also consulted for ventilator management. Her course was complicated by tracheostomy cuff leak; Pulmonary replaced the cuff 7/27. She also developed SVT for which scheduled metoprolol has been ordered. Cardiology was consulted. She also was noted to have a right shoulder dislocation on CTA for which Orthopedic Surgery has been consulted; no intervention recommended at this time. She has been unable to be weaned from mechanical ventilation at this time;. She was discharged with a five day course of Zerbaxa 8/1/2023 only to return 8/3/2023 as [facility] [place] did  not have Zerbaxa to administer. She will complete the Zerbaxa course inpatient.      Interval History:  Patient seen and examined.  Significant temperature instability overnight.  Remains nonverbal and nonreactive.      Objective:     Vital Signs (Most Recent):  Temp: 97.5 °F (36.4 °C) (08/06/23 1101)  Pulse: 74 (08/06/23 1508)  Resp: (!) 28 (08/06/23 1508)  BP: 110/74 (08/06/23 1303)  SpO2: 100 % (08/06/23 1508) Vital Signs (24h Range):  Temp:  [94.5 °F (34.7 °C)-98.8 °F (37.1 °C)] 97.5 °F (36.4 °C)  Pulse:  [53-77] 74  Resp:  [11-37] 28  SpO2:  [96 %-100 %] 100 %  BP: ()/(53-75) 110/74     Weight: 86.5 kg (190 lb 11.2 oz)  Body mass index is 33.78 kg/m².    Intake/Output Summary (Last 24 hours) at 8/6/2023 1525  Last data filed at 8/6/2023 1203  Gross per 24 hour   Intake 1659.7 ml   Output 915 ml   Net 744.7 ml           Physical Exam  Vitals and nursing note reviewed.   Constitutional:       General: She is not in acute distress.  Eyes:      Pupils: Pupils are equal, round, and reactive to light.   Neck:      Comments: Tracheostomy in place  Cardiovascular:      Rate and Rhythm: Normal rate and regular rhythm.      Heart sounds: No murmur heard.  Pulmonary:      Comments: Increased respiratory rate with coarse bilateral breath sounds noted  Abdominal:      General: There is no distension.      Palpations: Abdomen is soft.      Tenderness: There is no abdominal tenderness.      Comments: Percutaneous gastrostomy in place   Genitourinary:     Comments: Hamilton catheter in place  Skin:     Coloration: Skin is not pale.      Findings: No rash.   Neurological:      Mental Status: She is alert.      Comments: Nonverbal, nonreactive.  Significant spasticity noted.             Significant Labs: All pertinent labs within the past 24 hours have been reviewed.    Significant Imaging: I have reviewed all pertinent imaging results/findings within the past 24 hours.        Assessment/Plan:      * Chronic respiratory  failure  Patient with Hypoxic Respiratory failure which is Chronic.  she is on vent at chronic facility. Supplemental oxygen was provided and noted- Vent Mode: A/C  Oxygen Concentration (%):  [40] 40  Resp Rate Total:  [24 br/min-35 br/min] 25 br/min  Vt Set:  [365 mL] 365 mL  PEEP/CPAP:  [8 cmH20] 8 cmH20  Mean Airway Pressure:  [15 hwI66-32 cmH20] 16 cmH20    Contributing diagnoses includes - COPD and Pneumonia Labs and images were reviewed. Patient Has recent ABG, which has been reviewed. Will treat underlying causes and adjust management of respiratory failure as noted below    ABG  Recent Labs   Lab 08/01/23  0316   PH 7.455*   PO2 73*   PCO2 43.7   HCO3 30.7*   BE 7         Pneumonia of both lungs due to infectious organism  Patient with current diagnosis of pneumonia due to bacterial etiology due to  Pseudomonas which is uncontrolled due to persistent hypoxia . I have reviewed the pertinent imaging. Current antimicrobial regimen consists of   Antibiotics (From admission, onward)    Start     Stop Route Frequency Ordered    08/04/23 1115  ceftolozane-tazobactam (ZERBAXA) 3,000 mg in dextrose 5 % (D5W) 100 mL         08/08/23 0314 IV Every 8 hours (non-standard times) 08/04/23 0935    08/03/23 2145  mupirocin 2 % ointment         08/08/23 2059 Nasl 2 times daily 08/03/23 2033      . Cultures drawn and noted-   Microbiology Results (last 7 days)     Procedure Component Value Units Date/Time    Culture, Respiratory with Gram Stain [[id number]] Collected: 08/03/23 1831    Order Status: Completed Specimen: Respiratory from Endotracheal Aspirate Updated: 08/06/23 0825     Respiratory Culture Reduced normal respiratory kenny     Gram Stain (Respiratory) <10 epithelial cells per low power field.     Gram Stain (Respiratory) Rare WBC's     Gram Stain (Respiratory) No organisms seen         Will monitor patient closely and continue current treatment plan unchanged.  Will continue for 5 days of treatment with Zerbaxa  finishing on 08/08.      Multiple drug resistant organism (MDRO) culture positive  -Zerbaxa for a five day course for  Pseudomonas      Anemia of chronic disease  Patient's anemia is currently controlled. Has not received any PRBCs to date.. Etiology likely d/t multifactorial  Current CBC reviewed-   Lab Results   Component Value Date    HGB 7.3 (L) 08/06/2023    HCT 24.5 (L) 08/06/2023     Monitor serial CBC and transfuse if patient becomes hemodynamically unstable, symptomatic or H/H drops below 7/21.         Autonomic instability  Noted rapid swings and blood pressure and temperature, indicative likely severe anoxic brain injury.  No need for acute management at this time.      Functional quadriplegia  Patient with Chronic debility due to functional quadraplegia. Latest AMPAC and GEMS scores have been reviewed. Evaluation for etiology is complete. Plan includes progressive mobility protocol initated and fall precautions in place.      Paroxysmal SVT (supraventricular tachycardia)  Continue p.r.n. metoprolol.  Monitor closely on telemetry.  Etiology likely related to pneumonia and hypoxemia      Hepatic steatosis  Chronic, controlled.  Monitor LFTs.  Last LFTs reviewed.   Lab Results   Component Value Date    ALT 19 08/06/2023    ALT 19 08/06/2023    AST 17 08/06/2023    AST 17 08/06/2023    ALKPHOS 120 08/06/2023    ALKPHOS 120 08/06/2023    BILITOT 0.8 08/06/2023    BILITOT 0.8 08/06/2023           Obesity (BMI 30-39.9)  Body mass index is 33.78 kg/m². Morbid obesity complicates all aspects of disease management from diagnostic modalities to treatment.         Pressure injury of buttock, stage 2  Noted, wound care consulted.      Wounds, multiple  Noted, wound care consulted.      Difficult Hamilton catheter placement  -Hamilton in place      Essential hypertension  Chronic, controlled.  Latest blood pressure and vitals reviewed-     Temp:  [94.5 °F (34.7 °C)-98.8 °F (37.1 °C)]   Pulse:  [53-77]   Resp:  [11-37]    BP: ()/(53-75)   SpO2:  [96 %-100 %] .   Home meds for hypertension were reviewed and noted below-  Hypertension Medications             metoprolol tartrate (LOPRESSOR) 50 MG tablet 0.5 tablets (25 mg total) by Per G Tube route 2 (two) times daily.          While in the hospital, will manage blood pressure as follows; Continue home antihypertensive regimen    Will utilize p.r.n. blood pressure medication only if patient's blood pressure greater than 180/110 and she develops symptoms such as worsening chest pain or shortness of breath.      PEG (percutaneous endoscopic gastrostomy) status  Patient noted to have a percutaneous endoscopic gastrostomy tube in place. I have personally inspected the tube.Tube was placed prior to this admission There are no signs of drainage or infection around the site. The tube is patent. Medications have converted to liquid form if available.  Routine care to be done by wound care and nursing staff.           Tracheostomy dependence  This patient has a cuffed tracheostomy in place. Trach size is- 8.0 and type- Shiley. I have not inspected the stoma. There are no signs of bleeding or infection. Standard tracheostomy care per nursing and respiratory according to institution standards. Patient placed on mechanical ventilation. Speaking valve is absent.          Dislocation of right shoulder joint  -Ortho consulted; no intervention at this time    Persistent vegetative state    Noted, patient currently is a full code.  Will have advanced care planning conversation with her family once available.      VTE Risk Mitigation (From admission, onward)         Ordered     enoxaparin injection 40 mg  Daily         08/04/23 0935     Place sequential compression device  Until discontinued         08/04/23 0935     IP VTE HIGH RISK PATIENT  Once         08/03/23 2028                Discharge Planning   [place]: 8/7/2023     Code Status: Full Code   Is the patient medically ready for discharge?:      Reason for patient still in hospital (select all that apply): Treatment  Discharge Plan A: Return to nursing home                  Tolu Cross MD  Department of Hospital Medicine   [facility]

## 2023-08-07 LAB
ALBUMIN SERPL BCP-MCNC: 3.2 G/DL (ref 3.5–5.2)
ALBUMIN SERPL BCP-MCNC: 3.2 G/DL (ref 3.5–5.2)
ALP SERPL-CCNC: 112 U/L (ref 55–135)
ALP SERPL-CCNC: 112 U/L (ref 55–135)
ALT SERPL W/O P-5'-P-CCNC: 19 U/L (ref 10–44)
ALT SERPL W/O P-5'-P-CCNC: 19 U/L (ref 10–44)
ANION GAP SERPL CALC-SCNC: 7 MMOL/L (ref 8–16)
ANION GAP SERPL CALC-SCNC: 7 MMOL/L (ref 8–16)
AST SERPL-CCNC: 15 U/L (ref 10–40)
AST SERPL-CCNC: 15 U/L (ref 10–40)
BASOPHILS # BLD AUTO: 0.03 K/UL (ref 0–0.2)
BASOPHILS NFR BLD: 0.4 % (ref 0–1.9)
BILIRUB SERPL-MCNC: 0.7 MG/DL (ref 0.1–1)
BILIRUB SERPL-MCNC: 0.7 MG/DL (ref 0.1–1)
BUN SERPL-MCNC: 20 MG/DL (ref 6–20)
BUN SERPL-MCNC: 20 MG/DL (ref 6–20)
CALCIUM SERPL-MCNC: 9.3 MG/DL (ref 8.7–10.5)
CALCIUM SERPL-MCNC: 9.3 MG/DL (ref 8.7–10.5)
CHLORIDE SERPL-SCNC: 105 MMOL/L (ref 95–110)
CHLORIDE SERPL-SCNC: 105 MMOL/L (ref 95–110)
CO2 SERPL-SCNC: 26 MMOL/L (ref 23–29)
CO2 SERPL-SCNC: 26 MMOL/L (ref 23–29)
CREAT SERPL-MCNC: 0.5 MG/DL (ref 0.5–1.4)
CREAT SERPL-MCNC: 0.5 MG/DL (ref 0.5–1.4)
DIFFERENTIAL METHOD: ABNORMAL
EOSINOPHIL # BLD AUTO: 0.3 K/UL (ref 0–0.5)
EOSINOPHIL NFR BLD: 4.2 % (ref 0–8)
ERYTHROCYTE [DISTWIDTH] IN BLOOD BY AUTOMATED COUNT: 20.9 % (ref 11.5–14.5)
EST. GFR  (NO RACE VARIABLE): >60 ML/MIN/1.73 M^2
EST. GFR  (NO RACE VARIABLE): >60 ML/MIN/1.73 M^2
GLUCOSE SERPL-MCNC: 93 MG/DL (ref 70–110)
GLUCOSE SERPL-MCNC: 93 MG/DL (ref 70–110)
HCT VFR BLD AUTO: 26 % (ref 37–48.5)
HGB BLD-MCNC: 7.6 G/DL (ref 12–16)
IMM GRANULOCYTES # BLD AUTO: 0.02 K/UL (ref 0–0.04)
IMM GRANULOCYTES NFR BLD AUTO: 0.3 % (ref 0–0.5)
LYMPHOCYTES # BLD AUTO: 2.5 K/UL (ref 1–4.8)
LYMPHOCYTES NFR BLD: 36 % (ref 18–48)
MAGNESIUM SERPL-MCNC: 2.1 MG/DL (ref 1.6–2.6)
MCH RBC QN AUTO: 26.2 PG (ref 27–31)
MCHC RBC AUTO-ENTMCNC: 29.2 G/DL (ref 32–36)
MCV RBC AUTO: 90 FL (ref 82–98)
MONOCYTES # BLD AUTO: 0.6 K/UL (ref 0.3–1)
MONOCYTES NFR BLD: 8.3 % (ref 4–15)
NEUTROPHILS # BLD AUTO: 3.5 K/UL (ref 1.8–7.7)
NEUTROPHILS NFR BLD: 50.8 % (ref 38–73)
NRBC BLD-RTO: 0 /100 WBC
PHOSPHATE SERPL-MCNC: 3.7 MG/DL (ref 2.7–4.5)
PLATELET # BLD AUTO: 419 K/UL (ref 150–450)
PMV BLD AUTO: 11.1 FL (ref 9.2–12.9)
POTASSIUM SERPL-SCNC: 4.3 MMOL/L (ref 3.5–5.1)
POTASSIUM SERPL-SCNC: 4.3 MMOL/L (ref 3.5–5.1)
PROT SERPL-MCNC: 9.1 G/DL (ref 6–8.4)
PROT SERPL-MCNC: 9.1 G/DL (ref 6–8.4)
RBC # BLD AUTO: 2.9 M/UL (ref 4–5.4)
SODIUM SERPL-SCNC: 138 MMOL/L (ref 136–145)
SODIUM SERPL-SCNC: 138 MMOL/L (ref 136–145)
WBC # BLD AUTO: 6.95 K/UL (ref 3.9–12.7)

## 2023-08-07 PROCEDURE — 25000003 PHARM REV CODE 250: Performed by: HOSPITALIST

## 2023-08-07 PROCEDURE — 85025 COMPLETE CBC W/AUTO DIFF WBC: CPT | Performed by: STUDENT IN AN ORGANIZED HEALTH CARE EDUCATION/TRAINING PROGRAM

## 2023-08-07 PROCEDURE — 36415 COLL VENOUS BLD VENIPUNCTURE: CPT | Performed by: STUDENT IN AN ORGANIZED HEALTH CARE EDUCATION/TRAINING PROGRAM

## 2023-08-07 PROCEDURE — 80053 COMPREHEN METABOLIC PANEL: CPT | Performed by: STUDENT IN AN ORGANIZED HEALTH CARE EDUCATION/TRAINING PROGRAM

## 2023-08-07 PROCEDURE — 99221 PR INITIAL HOSPITAL CARE,LEVL I: ICD-10-PCS | Mod: ,,, | Performed by: FAMILY MEDICINE

## 2023-08-07 PROCEDURE — 83735 ASSAY OF MAGNESIUM: CPT | Performed by: STUDENT IN AN ORGANIZED HEALTH CARE EDUCATION/TRAINING PROGRAM

## 2023-08-07 PROCEDURE — 84100 ASSAY OF PHOSPHORUS: CPT | Performed by: STUDENT IN AN ORGANIZED HEALTH CARE EDUCATION/TRAINING PROGRAM

## 2023-08-07 PROCEDURE — 25000242 PHARM REV CODE 250 ALT 637 W/ HCPCS: Performed by: STUDENT IN AN ORGANIZED HEALTH CARE EDUCATION/TRAINING PROGRAM

## 2023-08-07 PROCEDURE — 25000003 PHARM REV CODE 250: Performed by: STUDENT IN AN ORGANIZED HEALTH CARE EDUCATION/TRAINING PROGRAM

## 2023-08-07 PROCEDURE — 94003 VENT MGMT INPAT SUBQ DAY: CPT

## 2023-08-07 PROCEDURE — 99900031 HC PATIENT EDUCATION (STAT)

## 2023-08-07 PROCEDURE — 94761 N-INVAS EAR/PLS OXIMETRY MLT: CPT

## 2023-08-07 PROCEDURE — 94640 AIRWAY INHALATION TREATMENT: CPT

## 2023-08-07 PROCEDURE — 20000000 HC ICU ROOM

## 2023-08-07 PROCEDURE — 94799 UNLISTED PULMONARY SVC/PX: CPT

## 2023-08-07 PROCEDURE — 27000221 HC OXYGEN, UP TO 24 HOURS

## 2023-08-07 PROCEDURE — 63600175 PHARM REV CODE 636 W HCPCS: Mod: JZ,JG | Performed by: STUDENT IN AN ORGANIZED HEALTH CARE EDUCATION/TRAINING PROGRAM

## 2023-08-07 PROCEDURE — 99900035 HC TECH TIME PER 15 MIN (STAT)

## 2023-08-07 PROCEDURE — 99900026 HC AIRWAY MAINTENANCE (STAT)

## 2023-08-07 PROCEDURE — 99221 1ST HOSP IP/OBS SF/LOW 40: CPT | Mod: ,,, | Performed by: FAMILY MEDICINE

## 2023-08-07 RX ADMIN — MICONAZOLE NITRATE: 20 CREAM TOPICAL at 09:08

## 2023-08-07 RX ADMIN — CARBOXYMETHYLCELLULOSE SODIUM 1 DROP: 5 SOLUTION/ DROPS OPHTHALMIC at 08:08

## 2023-08-07 RX ADMIN — CARBOXYMETHYLCELLULOSE SODIUM 1 DROP: 5 SOLUTION/ DROPS OPHTHALMIC at 09:08

## 2023-08-07 RX ADMIN — CARBOXYMETHYLCELLULOSE SODIUM 1 DROP: 5 SOLUTION/ DROPS OPHTHALMIC at 04:08

## 2023-08-07 RX ADMIN — MICONAZOLE NITRATE: 20 CREAM TOPICAL at 08:08

## 2023-08-07 RX ADMIN — CEFTOLOZANE AND TAZOBACTAM 3000 MG: 1; .5 INJECTION, POWDER, LYOPHILIZED, FOR SOLUTION INTRAVENOUS at 11:08

## 2023-08-07 RX ADMIN — FAMOTIDINE 20 MG: 10 INJECTION INTRAVENOUS at 09:08

## 2023-08-07 RX ADMIN — MUPIROCIN 1 G: 20 OINTMENT TOPICAL at 09:08

## 2023-08-07 RX ADMIN — CEFTOLOZANE AND TAZOBACTAM 3000 MG: 1; .5 INJECTION, POWDER, LYOPHILIZED, FOR SOLUTION INTRAVENOUS at 03:08

## 2023-08-07 RX ADMIN — MUPIROCIN 1 G: 20 OINTMENT TOPICAL at 08:08

## 2023-08-07 RX ADMIN — ENOXAPARIN SODIUM 40 MG: 100 INJECTION SUBCUTANEOUS at 04:08

## 2023-08-07 RX ADMIN — MIDODRINE HYDROCHLORIDE 5 MG: 2.5 TABLET ORAL at 04:08

## 2023-08-07 RX ADMIN — FAMOTIDINE 20 MG: 10 INJECTION INTRAVENOUS at 08:08

## 2023-08-07 RX ADMIN — LEVALBUTEROL HYDROCHLORIDE 1.25 MG: 1.25 SOLUTION RESPIRATORY (INHALATION) at 03:08

## 2023-08-07 RX ADMIN — METOPROLOL TARTRATE 12.5 MG: 25 TABLET, FILM COATED ORAL at 09:08

## 2023-08-07 RX ADMIN — MIDODRINE HYDROCHLORIDE 5 MG: 2.5 TABLET ORAL at 11:08

## 2023-08-07 RX ADMIN — LEVALBUTEROL HYDROCHLORIDE 1.25 MG: 1.25 SOLUTION RESPIRATORY (INHALATION) at 08:08

## 2023-08-07 RX ADMIN — POLYETHYLENE GLYCOL 3350 17 G: 17 POWDER, FOR SOLUTION ORAL at 11:08

## 2023-08-07 RX ADMIN — METOPROLOL TARTRATE 12.5 MG: 25 TABLET, FILM COATED ORAL at 08:08

## 2023-08-07 RX ADMIN — Medication 4000 UNITS: at 08:08

## 2023-08-07 RX ADMIN — CEFTOLOZANE AND TAZOBACTAM 3000 MG: 1; .5 INJECTION, POWDER, LYOPHILIZED, FOR SOLUTION INTRAVENOUS at 07:08

## 2023-08-07 RX ADMIN — LEVALBUTEROL HYDROCHLORIDE 1.25 MG: 1.25 SOLUTION RESPIRATORY (INHALATION) at 12:08

## 2023-08-07 RX ADMIN — CARBOXYMETHYLCELLULOSE SODIUM 1 DROP: 5 SOLUTION/ DROPS OPHTHALMIC at 12:08

## 2023-08-07 RX ADMIN — MIDODRINE HYDROCHLORIDE 5 MG: 2.5 TABLET ORAL at 05:08

## 2023-08-07 NOTE — SUBJECTIVE & OBJECTIVE
Interval History:  Patient seen and examined.  Continues to have significant temperature instability.  Remains nonverbal and nonreactive.      Objective:     Vital Signs (Most Recent):  Temp: 97.5 °F (36.4 °C) (08/07/23 1200)  Pulse: (!) 56 (08/07/23 1200)  Resp: (!) 27 (08/07/23 1200)  BP: (!) 114/55 (08/07/23 1200)  SpO2: 100 % (08/07/23 1200) Vital Signs (24h Range):  Temp:  [97.4 °F (36.3 °C)-98.8 °F (37.1 °C)] 97.5 °F (36.4 °C)  Pulse:  [56-80] 56  Resp:  [20-33] 27  SpO2:  [95 %-100 %] 100 %  BP: ()/(53-73) 114/55     Weight: 86.7 kg (191 lb 2.2 oz)  Body mass index is 33.86 kg/m².    Intake/Output Summary (Last 24 hours) at 8/7/2023 1459  Last data filed at 8/7/2023 1232  Gross per 24 hour   Intake 1105 ml   Output 1600 ml   Net -495 ml           Physical Exam  Vitals and nursing note reviewed.   Constitutional:       General: She is not in acute distress.  Eyes:      Pupils: Pupils are equal, round, and reactive to light.   Neck:      Comments: Tracheostomy in place  Cardiovascular:      Rate and Rhythm: Normal rate and regular rhythm.      Heart sounds: No murmur heard.  Pulmonary:      Comments: Increased respiratory rate with coarse bilateral breath sounds noted  Abdominal:      General: There is no distension.      Palpations: Abdomen is soft.      Tenderness: There is no abdominal tenderness.      Comments: Percutaneous gastrostomy in place   Genitourinary:     Comments: Hamilton catheter in place  Skin:     Coloration: Skin is not pale.      Findings: No rash.   Neurological:      Mental Status: She is alert.      Comments: Nonverbal, nonreactive.  Significant spasticity noted.             Significant Labs: All pertinent labs within the past 24 hours have been reviewed.    Significant Imaging: I have reviewed all pertinent imaging results/findings within the past 24 hours.

## 2023-08-07 NOTE — ASSESSMENT & PLAN NOTE
Chronic, controlled.  Latest blood pressure and vitals reviewed-     Temp:  [97.4 °F (36.3 °C)-98.8 °F (37.1 °C)]   Pulse:  [56-80]   Resp:  [20-33]   BP: ()/(53-73)   SpO2:  [95 %-100 %] .   Home meds for hypertension were reviewed and noted below-  Hypertension Medications             metoprolol tartrate (LOPRESSOR) 50 MG tablet 0.5 tablets (25 mg total) by Per G Tube route 2 (two) times daily.          While in the hospital, will manage blood pressure as follows; Continue home antihypertensive regimen    Will utilize p.r.n. blood pressure medication only if patient's blood pressure greater than 180/110 and she develops symptoms such as worsening chest pain or shortness of breath.

## 2023-08-07 NOTE — ASSESSMENT & PLAN NOTE
Patient with current diagnosis of pneumonia due to bacterial etiology due to  Pseudomonas which is uncontrolled due to persistent hypoxia . I have reviewed the pertinent imaging. Current antimicrobial regimen consists of   Antibiotics (From admission, onward)    Start     Stop Route Frequency Ordered    08/04/23 1115  ceftolozane-tazobactam (ZERBAXA) 3,000 mg in dextrose 5 % (D5W) 100 mL         08/08/23 0314 IV Every 8 hours (non-standard times) 08/04/23 0935    08/03/23 2145  mupirocin 2 % ointment         08/08/23 2059 Nasl 2 times daily 08/03/23 2033      . Cultures drawn and noted-   Microbiology Results (last 7 days)     Procedure Component Value Units Date/Time    Culture, Respiratory with Gram Stain [[id number]] Collected: 08/03/23 1831    Order Status: Completed Specimen: Respiratory from Endotracheal Aspirate Updated: 08/06/23 0825     Respiratory Culture Reduced normal respiratory kenny     Gram Stain (Respiratory) <10 epithelial cells per low power field.     Gram Stain (Respiratory) Rare WBC's     Gram Stain (Respiratory) No organisms seen         Will monitor patient closely and continue current treatment plan unchanged.  Will continue for 5 days of treatment with Zerbaxa finishing on 08/08.     Short Stay Documentation





- Allergies and Medications


Current Medications: 


 Allergies





No Known Allergies Allergy (Verified 11/07/16 09:43)


 





 Home Medications











 Medication  Instructions  Recorded  Confirmed  Last Taken  Type


 


predniSONE [Deltasone] 50 mg PO QDAY #5 tab 04/19/17 10/26/17 Unknown Rx


 


ALBUTEROL Inhaler 2 puff INHALATION BID 10/25/17 10/26/17 10/25/17 History


 


Advair 100-50 Diskus 2 puff INHALATION BID 10/25/17 10/26/17 10/26/17 History


 


Lisinopril 20 mg PO DAILY 10/25/17 10/26/17 10/25/17 History


 


Montelukast 10 mg PO DAILY 10/25/17 10/26/17 10/25/17 History


 


amLODIPine 5 mg PO DAILY 10/25/17 10/26/17 10/25/17 History








Active Medications





Sodium Chloride (Nacl 0.9% 1000 Ml)  1,000 mls @ 50 mls/hr IV AS DIRECT ANNY


   Last Admin: 10/26/17 08:46 Dose:  50 mls/hr











- Brief post op/procedure progress note


Date of procedure: 10/26/17


Pre-op diagnosis: colon cancer screening


Post-op diagnosis: same (1. Internal hemorrhoids  2. Poor prep)


Procedure: 





colonoscopy


Anesthesia: MAC


Findings: 





as above


Surgeon: ALEXA LECHUGA


Estimated blood loss: none


Pathology: none


Condition: stable





- Disposition


Condition at discharge: Stable


Disposition: DC-01 TO HOME OR SELFCARE





Short Stay Discharge Plan


Weight Bearing Status: Full Weight Bearing


Diet: regular


Follow up with: 


JONE RODRIGUEZ JR, MD [Primary Care Provider] - 7 Days

## 2023-08-07 NOTE — ASSESSMENT & PLAN NOTE
Chronic, controlled.  Monitor LFTs.  Last LFTs reviewed.   Lab Results   Component Value Date    ALT 19 08/07/2023    ALT 19 08/07/2023    AST 15 08/07/2023    AST 15 08/07/2023    ALKPHOS 112 08/07/2023    ALKPHOS 112 08/07/2023    BILITOT 0.7 08/07/2023    BILITOT 0.7 08/07/2023

## 2023-08-07 NOTE — ASSESSMENT & PLAN NOTE
Body mass index is 33.86 kg/m². Morbid obesity complicates all aspects of disease management from diagnostic modalities to treatment.

## 2023-08-07 NOTE — PLAN OF CARE
Patient remains with trach to ventilator. Afebrile. NSR/SB w/ occasional PVCs noted on monitor. Miralax given-BM x1. Purewick maintained-700cc output.     Plan of care reviewed with the patient. Unable to assess patient's level of understanding due to clinical status. Bed locked in lowest position with bed alarm set, call light within reach. Safety precautions maintained.

## 2023-08-07 NOTE — CARE UPDATE
08/07/23 0826   Patient Assessment/Suction   Level of Consciousness (AVPU) alert   Respiratory Effort Unlabored   All Lung Fields Breath Sounds coarse   Rhythm/Pattern, Respiratory assisted mechanically   Cough Frequency with stimulation   Cough Type assisted   $ Suction Charges Inline Suction Procedure Stat Charge   Secretions Amount moderate   Secretions Color white   Secretions Characteristics thick   PRE-TX-O2   Device (Oxygen Therapy) ventilator   $ Is the patient on Low Flow Oxygen? Yes   SpO2 99 %   Pulse Oximetry Type Continuous   $ Pulse Oximetry - Multiple Charge Pulse Oximetry - Multiple   Pulse 61   Resp (!) 24   Aerosol Therapy   $ Aerosol Therapy Charges Aerosol Treatment   Daily Review of Necessity (SVN) completed   Respiratory Treatment Status (SVN) given   Treatment Route (SVN) in-line   Patient Position (SVN) semi-Le's   Post Treatment Assessment (SVN) breath sounds improved   Signs of Intolerance (SVN) none   Breath Sounds Post-Respiratory Treatment   Throughout All Fields Post-Treatment All Fields   Throughout All Fields Post-Treatment aeration increased   Post-treatment Heart Rate (beats/min) 64   Post-treatment Resp Rate (breaths/min) 27   Adult Surgical Airway 07/27/23 1814 Shiley 8.0 mm DIC cuffed Extra Large Cuffed Distal 8.0 / 85 mm 8.0   Placement Date/Time: 07/27/23 1814   Present Prior to Hospital Arrival?: No  Inserted by: MD  Placed By: ICU physician  Type: Tracheostomy  Brand: Shiley  Airway Device Size and Style: 8.0 mm DIC cuffed  Airway Device Style: Extra Large Cuffed Distal ...   Site Assessment Clean;Dry   Site Care Dried;Cleansed   Inner Cannula Care Changed/new   Ties Assessment Clean;Dry   Airway Safety   Is Ambu Bag and Mask with Patient? Yes, Adult Ambu Bag and Mask   Vent Select   $ Ventilator Subsequent 1   Charged w/in last 24h YES   Preset Conventional Ventilator Settings   Vent ID 5

## 2023-08-07 NOTE — NURSING
Pt lying in bed in no acute distress. Repositioned pt. See ongoing assessment and charted vital signs in epic. See telemetry strip in chart. Verified ventilator settings against md order. Trach dressing clean, dry and intact. Tube feeding infusing per md order. Pt given CHG bath. Pt open eyes spontaneously. PERRLA. Pt does not track with eyes but looks in the direction of voice. Facial symmetry. Pt bilateral upper extremities are contracted. Withdraws bilateral lower extremities. Hands are contracted. Soft heel boots in place. See neuro assessment in epic. Bed alarm on. Will continue to monitor.

## 2023-08-07 NOTE — ASSESSMENT & PLAN NOTE
Patient with Hypoxic Respiratory failure which is Chronic.  she is on vent at chronic facility. Supplemental oxygen was provided and noted- Vent Mode: A/C  Oxygen Concentration (%):  [40] 40  Resp Rate Total:  [22 br/min-35 br/min] 24 br/min  Vt Set:  [365 mL] 365 mL  PEEP/CPAP:  [8 cmH20] 8 cmH20  Mean Airway Pressure:  [14 ciB56-39 cmH20] 19 cmH20    Contributing diagnoses includes - COPD and Pneumonia Labs and images were reviewed. Patient Has recent ABG, which has been reviewed. Will treat underlying causes and adjust management of respiratory failure as noted below

## 2023-08-07 NOTE — ASSESSMENT & PLAN NOTE
Patient's anemia is currently controlled. Has not received any PRBCs to date.. Etiology likely d/t multifactorial  Current CBC reviewed-   Lab Results   Component Value Date    HGB 7.6 (L) 08/07/2023    HCT 26.0 (L) 08/07/2023     Monitor serial CBC and transfuse if patient becomes hemodynamically unstable, symptomatic or H/H drops below 7/21.

## 2023-08-07 NOTE — CARE UPDATE
08/06/23 1945   Patient Assessment/Suction   Level of Consciousness (AVPU) alert   Respiratory Effort Unlabored   Expansion/Accessory Muscles/Retractions no use of accessory muscles   All Lung Fields Breath Sounds coarse   Rhythm/Pattern, Respiratory assisted mechanically   Cough Frequency with stimulation   Cough Type assisted   Suction Method oral;tracheal   $ Suction Charges Inline Suction Procedure Stat Charge   Secretions Amount moderate   Secretions Color white   Secretions Characteristics thin   PRE-TX-O2   Device (Oxygen Therapy) ventilator   $ Is the patient on Low Flow Oxygen? Yes   Oxygen Concentration (%) 40   SpO2 100 %   Pulse Oximetry Type Continuous   $ Pulse Oximetry - Multiple Charge Pulse Oximetry - Multiple   Pulse 69   Resp (!) 25   Vent Select   Conventional Vent Y   Charged w/in last 24h YES   Preset Conventional Ventilator Settings   Vent Type    Ventilation Type VC   Vent Mode A/C   Humidity HME   Set Rate 24 BPM   Vt Set 365 mL   PEEP/CPAP 8 cmH20   Peak Flow 45 L/min   Peak End Inspiratory Pressure 26 cmH20   I-Trigger Type  V-TRIG   Trigger Sensitivity Flow/I-Trigger 3 L/min   Patient Ventilator Parameters   Resp Rate Total 24 br/min   Peak Airway Pressure 33 cmH20   Mean Airway Pressure 16 cmH20   Plateau Pressure 27 cmH20   Exhaled Vt 372 mL   Total Ve 8.86 L/m   I:E Ratio Measured 1:1.80   Auto PEEP 4.5 cmH20   Tubing ID (mm) 8 mm   Tube Type Trach   Conventional Ventilator Alarms   Alarms On Y   Resp Rate High Alarm 50 br/min   Press High Alarm 60 cmH2O   Apnea Rate 24   Apnea Volume (mL) 0 mL   Apnea Oxygen Concentration  100   Apnea Flow Rate (L/min) 55   T Apnea 20 sec(s)   Ready to Wean/Extubation Screen   FIO2<=50 (chart decimal) 0.4   MV<16L (chart vol.) 8.86   PEEP <=8 (chart #) 8   Ready to Wean Parameters   F/VT Ratio<105 (RSBI) (!) 67.2   Vital Capacity   Vital Capacity (mL) 0

## 2023-08-07 NOTE — NURSING
Pt lying in bed in no acute distress. Purwick functioning without complications. Bladder scan showed no urine in pt.'s bladder. Abdomen not distended. Patricia colored urine in purwick container. See intake and output in epic. Will continue to monitor.

## 2023-08-07 NOTE — PROGRESS NOTES
[facility] Medicine  Progress Note    Patient Name: Genna Felix  MRN: [id number]  Patient Class: IP- Inpatient   Admission Date: 8/3/2023  Length of Stay: 4 days  Attending Physician: Tolu Cross MD  Primary Care Provider: Nikky, Primary Doctor        Subjective:     Principal Problem:Chronic respiratory failure        HPI:  Genna Felix is a 62 year old female with a past medical history of anoxic brain injury with quadriplegia, tracheostomy and G tube, PSVT, obesity, stage two sacral wound, HTN, anemia and right shoulder dislocation with recent admission for acute on chronic respiratory failure with CRP Pseudomonas VAP requiring new mechanical ventilation who presents back from [facility] as the patient has apparently been unable to complete Zerbaxa therapy as this antibiotic is not available at that facility. She will be admitted to the ICU and complete four more days of Zerbaxa per prior ID recommendations.      Overview/Hospital Course:  Genna Felix is a 62 year old female with a past medical history of anoxic brain injury s/p G tube and tracheostomy, chronic respiratory failure, PE, GERD, HTN, hepatic steatosis, obesity, and anemia who presented with severe sepsis with acute respiratory failure requiring mechanical ventilation. Respiratory cultures showed  Pseudomonas. ID has been consulted and started Avycaz. Pulmonary was also consulted for ventilator management. Her course was complicated by tracheostomy cuff leak; Pulmonary replaced the cuff 7/27. She also developed SVT for which scheduled metoprolol has been ordered. Cardiology was consulted. She also was noted to have a right shoulder dislocation on CTA for which Orthopedic Surgery has been consulted; no intervention recommended at this time. She has been unable to be weaned from mechanical ventilation at this time;. She was discharged with a five day course of Zerbaxa 8/1/2023 only to return 8/3/2023 as [facility] did  not have Zerbaxa to administer. She will complete the Zerbaxa course inpatient.      Interval History:  Patient seen and examined.  Continues to have significant temperature instability.  Remains nonverbal and nonreactive.      Objective:     Vital Signs (Most Recent):  Temp: 97.5 °F (36.4 °C) (08/07/23 1200)  Pulse: (!) 56 (08/07/23 1200)  Resp: (!) 27 (08/07/23 1200)  BP: (!) 114/55 (08/07/23 1200)  SpO2: 100 % (08/07/23 1200) Vital Signs (24h Range):  Temp:  [97.4 °F (36.3 °C)-98.8 °F (37.1 °C)] 97.5 °F (36.4 °C)  Pulse:  [56-80] 56  Resp:  [20-33] 27  SpO2:  [95 %-100 %] 100 %  BP: ()/(53-73) 114/55     Weight: 86.7 kg (191 lb 2.2 oz)  Body mass index is 33.86 kg/m².    Intake/Output Summary (Last 24 hours) at 8/7/2023 1459  Last data filed at 8/7/2023 1232  Gross per 24 hour   Intake 1105 ml   Output 1600 ml   Net -495 ml           Physical Exam  Vitals and nursing note reviewed.   Constitutional:       General: She is not in acute distress.  Eyes:      Pupils: Pupils are equal, round, and reactive to light.   Neck:      Comments: Tracheostomy in place  Cardiovascular:      Rate and Rhythm: Normal rate and regular rhythm.      Heart sounds: No murmur heard.  Pulmonary:      Comments: Increased respiratory rate with coarse bilateral breath sounds noted  Abdominal:      General: There is no distension.      Palpations: Abdomen is soft.      Tenderness: There is no abdominal tenderness.      Comments: Percutaneous gastrostomy in place   Genitourinary:     Comments: Hamilton catheter in place  Skin:     Coloration: Skin is not pale.      Findings: No rash.   Neurological:      Mental Status: She is alert.      Comments: Nonverbal, nonreactive.  Significant spasticity noted.             Significant Labs: All pertinent labs within the past 24 hours have been reviewed.    Significant Imaging: I have reviewed all pertinent imaging results/findings within the past 24 hours.          Assessment/Plan:      * Chronic  respiratory failure  Patient with Hypoxic Respiratory failure which is Chronic.  she is on vent at chronic facility. Supplemental oxygen was provided and noted- Vent Mode: A/C  Oxygen Concentration (%):  [40] 40  Resp Rate Total:  [22 br/min-35 br/min] 24 br/min  Vt Set:  [365 mL] 365 mL  PEEP/CPAP:  [8 cmH20] 8 cmH20  Mean Airway Pressure:  [14 noM64-44 cmH20] 19 cmH20    Contributing diagnoses includes - COPD and Pneumonia Labs and images were reviewed. Patient Has recent ABG, which has been reviewed. Will treat underlying causes and adjust management of respiratory failure as noted below        Pneumonia of both lungs due to infectious organism  Patient with current diagnosis of pneumonia due to bacterial etiology due to  Pseudomonas which is uncontrolled due to persistent hypoxia . I have reviewed the pertinent imaging. Current antimicrobial regimen consists of   Antibiotics (From admission, onward)    Start     Stop Route Frequency Ordered    08/04/23 1115  ceftolozane-tazobactam (ZERBAXA) 3,000 mg in dextrose 5 % (D5W) 100 mL         08/08/23 0314 IV Every 8 hours (non-standard times) 08/04/23 0935    08/03/23 2145  mupirocin 2 % ointment         08/08/23 2059 Nasl 2 times daily 08/03/23 2033      . Cultures drawn and noted-   Microbiology Results (last 7 days)     Procedure Component Value Units Date/Time    Culture, Respiratory with Gram Stain [[id number]] Collected: 08/03/23 1831    Order Status: Completed Specimen: Respiratory from Endotracheal Aspirate Updated: 08/06/23 0825     Respiratory Culture Reduced normal respiratory kenny     Gram Stain (Respiratory) <10 epithelial cells per low power field.     Gram Stain (Respiratory) Rare WBC's     Gram Stain (Respiratory) No organisms seen         Will monitor patient closely and continue current treatment plan unchanged.  Will continue for 5 days of treatment with Zerbaxa finishing on 08/08.      Multiple drug resistant organism (MDRO) culture  positive  -Zerbaxa for a five day course for  Pseudomonas      Anemia of chronic disease  Patient's anemia is currently controlled. Has not received any PRBCs to date.. Etiology likely d/t multifactorial  Current CBC reviewed-   Lab Results   Component Value Date    HGB 7.6 (L) 08/07/2023    HCT 26.0 (L) 08/07/2023     Monitor serial CBC and transfuse if patient becomes hemodynamically unstable, symptomatic or H/H drops below 7/21.         Autonomic instability  Noted rapid swings and blood pressure and temperature, indicative likely severe anoxic brain injury.  No need for acute management at this time.      Functional quadriplegia  Patient with Chronic debility due to functional quadraplegia. Latest AMPAC and GEMS scores have been reviewed. Evaluation for etiology is complete. Plan includes progressive mobility protocol initated and fall precautions in place.      Paroxysmal SVT (supraventricular tachycardia)  Continue p.r.n. metoprolol.  Monitor closely on telemetry.  Etiology likely related to pneumonia and hypoxemia      Hepatic steatosis  Chronic, controlled.  Monitor LFTs.  Last LFTs reviewed.   Lab Results   Component Value Date    ALT 19 08/07/2023    ALT 19 08/07/2023    AST 15 08/07/2023    AST 15 08/07/2023    ALKPHOS 112 08/07/2023    ALKPHOS 112 08/07/2023    BILITOT 0.7 08/07/2023    BILITOT 0.7 08/07/2023           Obesity (BMI 30-39.9)  Body mass index is 33.86 kg/m². Morbid obesity complicates all aspects of disease management from diagnostic modalities to treatment.         Pressure injury of buttock, stage 2  Noted, wound care consulted.      Wounds, multiple  Noted, wound care consulted.      Difficult Hamilton catheter placement  Patient lost Hamilton catheter on 08/06.  She currently has PureWick catheter in place.      Essential hypertension  Chronic, controlled.  Latest blood pressure and vitals reviewed-     Temp:  [97.4 °F (36.3 °C)-98.8 °F (37.1 °C)]   Pulse:  [56-80]   Resp:  [20-33]   BP:  ()/(53-73)   SpO2:  [95 %-100 %] .   Home meds for hypertension were reviewed and noted below-  Hypertension Medications             metoprolol tartrate (LOPRESSOR) 50 MG tablet 0.5 tablets (25 mg total) by Per G Tube route 2 (two) times daily.          While in the hospital, will manage blood pressure as follows; Continue home antihypertensive regimen    Will utilize p.r.n. blood pressure medication only if patient's blood pressure greater than 180/110 and she develops symptoms such as worsening chest pain or shortness of breath.      PEG (percutaneous endoscopic gastrostomy) status  Patient noted to have a percutaneous endoscopic gastrostomy tube in place. I have personally inspected the tube.Tube was placed prior to this admission There are no signs of drainage or infection around the site. The tube is patent. Medications have converted to liquid form if available.  Routine care to be done by wound care and nursing staff.           Tracheostomy dependence  This patient has a cuffed tracheostomy in place. Trach size is- 8.0 and type- Shiley. I have not inspected the stoma. There are no signs of bleeding or infection. Standard tracheostomy care per nursing and respiratory according to institution standards. Patient placed on mechanical ventilation. Speaking valve is absent.          Dislocation of right shoulder joint  -Ortho consulted; no intervention at this time    Persistent vegetative state    Noted, patient currently is a full code.  Will have advanced care planning conversation with her family once available.      VTE Risk Mitigation (From admission, onward)         Ordered     enoxaparin injection 40 mg  Daily         08/04/23 0935     Place sequential compression device  Until discontinued         08/04/23 0935     IP VTE HIGH RISK PATIENT  Once         08/03/23 2028                Discharge Planning   [place]: 8/8/2023     Code Status: Full Code   Is the patient medically ready for discharge?:      Reason for patient still in hospital (select all that apply): Treatment  Discharge Plan A: Return to nursing home                  Tolu Cross MD  Department of Hospital Medicine   [facility]

## 2023-08-08 VITALS
HEART RATE: 55 BPM | BODY MASS INDEX: 33.87 KG/M2 | WEIGHT: 191.13 LBS | HEIGHT: 63 IN | OXYGEN SATURATION: 100 % | RESPIRATION RATE: 24 BRPM | DIASTOLIC BLOOD PRESSURE: 57 MMHG | SYSTOLIC BLOOD PRESSURE: 110 MMHG | TEMPERATURE: 96 F

## 2023-08-08 LAB
ALBUMIN SERPL BCP-MCNC: 3.1 G/DL (ref 3.5–5.2)
ALBUMIN SERPL BCP-MCNC: 3.1 G/DL (ref 3.5–5.2)
ALP SERPL-CCNC: 118 U/L (ref 55–135)
ALP SERPL-CCNC: 118 U/L (ref 55–135)
ALT SERPL W/O P-5'-P-CCNC: 17 U/L (ref 10–44)
ALT SERPL W/O P-5'-P-CCNC: 17 U/L (ref 10–44)
ANION GAP SERPL CALC-SCNC: 8 MMOL/L (ref 8–16)
ANION GAP SERPL CALC-SCNC: 8 MMOL/L (ref 8–16)
AST SERPL-CCNC: 16 U/L (ref 10–40)
AST SERPL-CCNC: 16 U/L (ref 10–40)
BASOPHILS # BLD AUTO: 0.03 K/UL (ref 0–0.2)
BASOPHILS NFR BLD: 0.5 % (ref 0–1.9)
BILIRUB SERPL-MCNC: 0.6 MG/DL (ref 0.1–1)
BILIRUB SERPL-MCNC: 0.6 MG/DL (ref 0.1–1)
BUN SERPL-MCNC: 21 MG/DL (ref 6–20)
BUN SERPL-MCNC: 21 MG/DL (ref 6–20)
CALCIUM SERPL-MCNC: 9.1 MG/DL (ref 8.7–10.5)
CALCIUM SERPL-MCNC: 9.1 MG/DL (ref 8.7–10.5)
CHLORIDE SERPL-SCNC: 106 MMOL/L (ref 95–110)
CHLORIDE SERPL-SCNC: 106 MMOL/L (ref 95–110)
CO2 SERPL-SCNC: 25 MMOL/L (ref 23–29)
CO2 SERPL-SCNC: 25 MMOL/L (ref 23–29)
CREAT SERPL-MCNC: 0.3 MG/DL (ref 0.5–1.4)
CREAT SERPL-MCNC: 0.3 MG/DL (ref 0.5–1.4)
DIFFERENTIAL METHOD: ABNORMAL
EOSINOPHIL # BLD AUTO: 0.3 K/UL (ref 0–0.5)
EOSINOPHIL NFR BLD: 5 % (ref 0–8)
ERYTHROCYTE [DISTWIDTH] IN BLOOD BY AUTOMATED COUNT: 20.7 % (ref 11.5–14.5)
EST. GFR  (NO RACE VARIABLE): >60 ML/MIN/1.73 M^2
EST. GFR  (NO RACE VARIABLE): >60 ML/MIN/1.73 M^2
GLUCOSE SERPL-MCNC: 95 MG/DL (ref 70–110)
GLUCOSE SERPL-MCNC: 95 MG/DL (ref 70–110)
HCT VFR BLD AUTO: 27.9 % (ref 37–48.5)
HGB BLD-MCNC: 8.3 G/DL (ref 12–16)
IMM GRANULOCYTES # BLD AUTO: 0.02 K/UL (ref 0–0.04)
IMM GRANULOCYTES NFR BLD AUTO: 0.3 % (ref 0–0.5)
LYMPHOCYTES # BLD AUTO: 2 K/UL (ref 1–4.8)
LYMPHOCYTES NFR BLD: 33.5 % (ref 18–48)
MAGNESIUM SERPL-MCNC: 2.1 MG/DL (ref 1.6–2.6)
MCH RBC QN AUTO: 26.5 PG (ref 27–31)
MCHC RBC AUTO-ENTMCNC: 29.7 G/DL (ref 32–36)
MCV RBC AUTO: 89 FL (ref 82–98)
MONOCYTES # BLD AUTO: 0.6 K/UL (ref 0.3–1)
MONOCYTES NFR BLD: 10.4 % (ref 4–15)
NEUTROPHILS # BLD AUTO: 2.9 K/UL (ref 1.8–7.7)
NEUTROPHILS NFR BLD: 50.3 % (ref 38–73)
NRBC BLD-RTO: 1 /100 WBC
PHOSPHATE SERPL-MCNC: 4.2 MG/DL (ref 2.7–4.5)
PLATELET # BLD AUTO: 384 K/UL (ref 150–450)
PMV BLD AUTO: 10.9 FL (ref 9.2–12.9)
POTASSIUM SERPL-SCNC: 4 MMOL/L (ref 3.5–5.1)
POTASSIUM SERPL-SCNC: 4 MMOL/L (ref 3.5–5.1)
PROT SERPL-MCNC: 9.1 G/DL (ref 6–8.4)
PROT SERPL-MCNC: 9.1 G/DL (ref 6–8.4)
RBC # BLD AUTO: 3.13 M/UL (ref 4–5.4)
SODIUM SERPL-SCNC: 139 MMOL/L (ref 136–145)
SODIUM SERPL-SCNC: 139 MMOL/L (ref 136–145)
WBC # BLD AUTO: 5.85 K/UL (ref 3.9–12.7)

## 2023-08-08 PROCEDURE — 25000003 PHARM REV CODE 250: Performed by: STUDENT IN AN ORGANIZED HEALTH CARE EDUCATION/TRAINING PROGRAM

## 2023-08-08 PROCEDURE — 99900031 HC PATIENT EDUCATION (STAT)

## 2023-08-08 PROCEDURE — 94760 N-INVAS EAR/PLS OXIMETRY 1: CPT

## 2023-08-08 PROCEDURE — 36415 COLL VENOUS BLD VENIPUNCTURE: CPT | Performed by: STUDENT IN AN ORGANIZED HEALTH CARE EDUCATION/TRAINING PROGRAM

## 2023-08-08 PROCEDURE — 94003 VENT MGMT INPAT SUBQ DAY: CPT

## 2023-08-08 PROCEDURE — 94640 AIRWAY INHALATION TREATMENT: CPT

## 2023-08-08 PROCEDURE — 94761 N-INVAS EAR/PLS OXIMETRY MLT: CPT

## 2023-08-08 PROCEDURE — 99900026 HC AIRWAY MAINTENANCE (STAT)

## 2023-08-08 PROCEDURE — 25000003 PHARM REV CODE 250: Performed by: HOSPITALIST

## 2023-08-08 PROCEDURE — 27000221 HC OXYGEN, UP TO 24 HOURS

## 2023-08-08 PROCEDURE — 85025 COMPLETE CBC W/AUTO DIFF WBC: CPT | Performed by: STUDENT IN AN ORGANIZED HEALTH CARE EDUCATION/TRAINING PROGRAM

## 2023-08-08 PROCEDURE — 83735 ASSAY OF MAGNESIUM: CPT | Performed by: STUDENT IN AN ORGANIZED HEALTH CARE EDUCATION/TRAINING PROGRAM

## 2023-08-08 PROCEDURE — 84100 ASSAY OF PHOSPHORUS: CPT | Performed by: STUDENT IN AN ORGANIZED HEALTH CARE EDUCATION/TRAINING PROGRAM

## 2023-08-08 PROCEDURE — 80053 COMPREHEN METABOLIC PANEL: CPT | Performed by: STUDENT IN AN ORGANIZED HEALTH CARE EDUCATION/TRAINING PROGRAM

## 2023-08-08 PROCEDURE — 99900035 HC TECH TIME PER 15 MIN (STAT)

## 2023-08-08 PROCEDURE — 63600175 PHARM REV CODE 636 W HCPCS: Performed by: STUDENT IN AN ORGANIZED HEALTH CARE EDUCATION/TRAINING PROGRAM

## 2023-08-08 PROCEDURE — 25000242 PHARM REV CODE 250 ALT 637 W/ HCPCS: Performed by: STUDENT IN AN ORGANIZED HEALTH CARE EDUCATION/TRAINING PROGRAM

## 2023-08-08 RX ADMIN — LEVALBUTEROL HYDROCHLORIDE 1.25 MG: 1.25 SOLUTION RESPIRATORY (INHALATION) at 07:08

## 2023-08-08 RX ADMIN — ENOXAPARIN SODIUM 40 MG: 100 INJECTION SUBCUTANEOUS at 04:08

## 2023-08-08 RX ADMIN — MIDODRINE HYDROCHLORIDE 5 MG: 2.5 TABLET ORAL at 04:08

## 2023-08-08 RX ADMIN — Medication 4000 UNITS: at 08:08

## 2023-08-08 RX ADMIN — MIDODRINE HYDROCHLORIDE 5 MG: 2.5 TABLET ORAL at 06:08

## 2023-08-08 RX ADMIN — CARBOXYMETHYLCELLULOSE SODIUM 1 DROP: 5 SOLUTION/ DROPS OPHTHALMIC at 12:08

## 2023-08-08 RX ADMIN — MIDODRINE HYDROCHLORIDE 5 MG: 2.5 TABLET ORAL at 11:08

## 2023-08-08 RX ADMIN — CARBOXYMETHYLCELLULOSE SODIUM 1 DROP: 5 SOLUTION/ DROPS OPHTHALMIC at 04:08

## 2023-08-08 RX ADMIN — METOPROLOL TARTRATE 12.5 MG: 25 TABLET, FILM COATED ORAL at 08:08

## 2023-08-08 RX ADMIN — LEVALBUTEROL HYDROCHLORIDE 1.25 MG: 1.25 SOLUTION RESPIRATORY (INHALATION) at 03:08

## 2023-08-08 RX ADMIN — FAMOTIDINE 20 MG: 10 INJECTION INTRAVENOUS at 08:08

## 2023-08-08 RX ADMIN — CARBOXYMETHYLCELLULOSE SODIUM 1 DROP: 5 SOLUTION/ DROPS OPHTHALMIC at 08:08

## 2023-08-08 RX ADMIN — MUPIROCIN 1 G: 20 OINTMENT TOPICAL at 08:08

## 2023-08-08 RX ADMIN — MICONAZOLE NITRATE: 20 CREAM TOPICAL at 08:08

## 2023-08-08 RX ADMIN — LEVALBUTEROL HYDROCHLORIDE 1.25 MG: 1.25 SOLUTION RESPIRATORY (INHALATION) at 12:08

## 2023-08-08 RX ADMIN — ACETAMINOPHEN 650 MG: 325 TABLET ORAL at 04:08

## 2023-08-08 NOTE — PLAN OF CARE
Pt rested peacefully through the night, resp even and unlabored on the ventilator, SR to SB with PVCs and PACs, pt had 450 of UOP via purewick and 1 loose stool and 1 unmeasured urination, abdomen firm and taut, pt temp dropped to 93.2 ax after her bath and linen change, placed bear hugger on pt. Current temp 96.6 ax    Problem: Adult Inpatient Plan of Care  Goal: Optimal Comfort and Wellbeing  Outcome: Ongoing, Progressing     Problem: Infection (Pneumonia)  Goal: Resolution of Infection Signs and Symptoms  Outcome: Ongoing, Progressing     Problem: Respiratory Compromise (Pneumonia)  Goal: Effective Oxygenation and Ventilation  Outcome: Ongoing, Progressing     Problem: Impaired Wound Healing  Goal: Optimal Wound Healing  Outcome: Ongoing, Progressing

## 2023-08-08 NOTE — PLAN OF CARE
Per She with [facility], report can be called to [facility] @ [phone]. Transportation scheduled for 1400. Primary nurse updated.      08/08/23 1320   Post-Acute Status   Post-Acute Authorization Placement   Post-Acute Placement Status Set-up Complete/Auth obtained

## 2023-08-08 NOTE — PLAN OF CARE
Pt clear for DC from CM standpoint. Discharging to [facility].      08/08/23 1321   Final Note   Assessment Type Final Discharge Note   Anticipated Discharge Disposition [facility]

## 2023-08-08 NOTE — DISCHARGE INSTRUCTIONS
[facility]  Facility Transfer Orders        Admit to:  [facility]    Diagnoses:   Active Hospital Problems    Diagnosis  POA    *Chronic respiratory failure [J96.10]  Yes     Priority: 1 - High    Pneumonia of both lungs due to infectious organism [J18.9]  Yes     Priority: 2     Multiple drug resistant organism (MDRO) culture positive [Z16.24]  Yes     Priority: 4     Anemia of chronic disease [D63.8]  Yes     Priority: 7     Autonomic instability [G90.9]  Yes    Functional quadriplegia [R53.2]  Yes    Paroxysmal SVT (supraventricular tachycardia) [I47.1]  Yes    Hepatic steatosis [K76.0]  Yes    Obesity (BMI 30-39.9) [E66.9]  Yes    Pressure injury of buttock, stage 2 [L89.302]  Yes    Wounds, multiple [T07.XXXA]  Yes    Difficult Hamilton catheter placement [T83.9XXA]  Yes    Essential hypertension [I10]  Yes    PEG (percutaneous endoscopic gastrostomy) status [Z93.1]  Not Applicable    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]  Not Applicable    Dislocation of right shoulder joint [S43.004A]  Yes    Persistent vegetative state [R40.3]  Yes      Resolved Hospital Problems    Diagnosis Date Resolved POA    On tube feeding diet [Z78.9] 08/05/2023 Yes     Allergies: Review of patient's allergies indicates:  No Known Allergies    Code Status:  Full    Vitals: Routine       Diet:  Resume tube feeds as previous    Activity: Activity as tolerated    Nursing Precautions: Aspiration , Fall, Seizure, and Pressure ulcer prevention    Bed/Surface: Pressure Redistribution    Consults: Wound Care and Nutrition to evaluate and recommend diet    Oxygen:  Patient on ventilator- Vent Mode: A/C  Oxygen Concentration (%):  [40] 40  Resp Rate Total:  [24 br/min-35 br/min] 25 br/min  Vt Set:  [365 mL] 365 mL  PEEP/CPAP:  [8 cmH20] 8 cmH20  Mean Airway Pressure:  [14 xoS79-45 cmH20] 16 cmH20        Dialysis: Patient is not on dialysis.     Labs:  Weekly CBC, BMP, magnesium                 Pending Diagnostic Studies:        None          Imaging: none    Miscellaneous Care:   Colostomy Care:  Empty bag every shift and prn  PEG Care:  Clean site every 24 hours  Routine Skin for Bedridden Patients:  Apply moisture barrier cream to all    IV Access: none     Medications: Discontinue all previous medication orders, if any. See new list below.  Current Discharge Medication List        CONTINUE these medications which have NOT CHANGED    Details   acetaminophen (TYLENOL) 325 MG tablet 2 tablets (650 mg total) by Per G Tube route every 4 (four) hours as needed for Pain or Temperature greater than (100.4F).  Refills: 0      acetylcysteine 100 mg/ml, 10%, (MUCOMYST) 100 mg/mL (10 %) nebulizer solution Take 4 mLs by nebulization every 8 (eight) hours.  Refills: 12      albuterol-ipratropium (DUO-NEB) 2.5 mg-0.5 mg/3 mL nebulizer solution Take 3 mLs by nebulization every 6 (six) hours. Rescue  Qty: 1 Box, Refills: 0      arginine/glutamine/calcium bmb (XIOMARA ORAL) 1 Package by PEG Tube route 2 (two) times a day. In 8oz of water      cycloSPORINE (RESTASIS) 0.05 % ophthalmic emulsion 1 drop 2 (two) times daily. 0900  2100      dextran 70-hypromellose (ARTIFICIAL TEARS,[id number],) ophthalmic solution Place 1 drop into both eyes 4 (four) times daily as needed.      ergocalciferol (ERGOCALCIFEROL) 50,000 unit Cap Take 50,000 Units by mouth every Saturday.      ipratropium (ATROVENT HFA) 17 mcg/actuation inhaler Inhale 2 puffs 4 times a day by inhalation route as needed.      lactose-reduced food (ISOSOURCE ORAL) 1.5 mg by Per G Tube route continuous. 1.5 per peg tube at 50ml/hr continuously      linaCLOtide (LINZESS) 145 mcg Cap capsule Take 145 mcg by mouth before breakfast.      metoclopramide HCl (REGLAN) 10 MG tablet Take 1 tablet 3 times a day by oral route.      metoprolol tartrate (LOPRESSOR) 50 MG tablet 0.5 tablets (25 mg total) by Per G Tube route 2 (two) times daily.  Qty: 30 tablet, Refills: 11    Comments: .      miconazole  (MICOTIN) 2 % cream Apply topically 2 (two) times daily. Reason:  Tinea pedis for 14 days  Refills: 0      midodrine (PROAMATINE) 5 MG Tab Take 1 tablet 3 times a day by oral route.      ondansetron (ZOFRAN) 4 MG tablet 4 mg by Per G Tube route every 8 (eight) hours as needed for Nausea.      ondansetron (ZOFRAN-ODT) 4 MG TbDL Take by mouth.      polyethylene glycol (GLYCOLAX) 17 gram PwPk Take 17 g by mouth daily as needed.      polyvinyl alcohol-povidone (CLEAR EYES NATURAL TEARS) 0.5-0.6 % Drop Apply 1 drop to eye daily as needed.      promethazine (PHENERGAN) 12.5 MG Tab Take 1 tablet 4 times a day by oral route as needed.      scopolamine (TRANSDERM-SCOP) 1.3-1.5 mg (1 mg over 3 days) scopolamine 1 mg over 3 days transdermal patch   APPLY 1 PATCH BY TRANSDERMAL ROUTE TO THE HAIRLESS AREA BEHIND 1 EAR AT LEAST 4 HR BEFORE EFFECT IS REQUIRED; REAPPLY EVERY 3 DAYS AS NEEDED           STOP taking these medications       dextrose 5 % (D5W) SolP 100 mL with ceftolozane-tazobactam 1.5 gram SolR 3,000 mg injection Comments:   Reason for Stopping:             Follow up:       Immunizations Administered as of 8/8/2023       No immunizations on file.                    Tolu Cross MD

## 2023-08-08 NOTE — CONSULTS
Chief complaint:  OTHER (Vent pt from [facility] recently d/c  8/2/23 with Rx for ceftolozane-tazobactum 1.5gm [id number] x 4 days. [facility]  unable to get insurance to approve meds. Sent pt back to be readmitted for the rest of treatment. )      HPI:  Genna Felix is a 62 y.o. female presenting with a stage 2 pressure ulcer of the left buttock. Pt is non verbal, and known to me from prior inpatient. Pt is trached and comfortable.    PMH:  As per HPI and below:  Past Medical History:   Diagnosis Date    Anoxic brain damage 07/2020    Bedbound 07/2020    Cardiac arrest 07/2020    Cirrhosis     GERD (gastroesophageal reflux disease)     Hemangioma of intra-abdominal structure     Hx pulmonary embolism 11/13/2020    Hypertension     Nursing home resident     Protein calorie malnutrition     Pulmonary embolus     Respiratory distress     S/P percutaneous endoscopic gastrostomy (PEG) tube placement 07/2020    Total self-care deficit 07/2020    Tracheostomy dependence     7/2020       Social History     Socioeconomic History    Marital status:    Tobacco Use    Smoking status: Never    Smokeless tobacco: Never   Substance and Sexual Activity    Alcohol use: Not Currently    Drug use: Not Currently    Sexual activity: Not Currently       Past Surgical History:   Procedure Laterality Date    PEG W/TRACHEOSTOMY PLACEMENT  07/27/2020    SKIN GRAFT      buttocks       Family History   Problem Relation Age of Onset    Hypertension Mother     No Known Problems Father        Review of patient's allergies indicates:  No Known Allergies    No current facility-administered medications on file prior to encounter.     Current Outpatient Medications on File Prior to Encounter   Medication Sig Dispense Refill    acetaminophen (TYLENOL) 325 MG tablet 2 tablets (650 mg total) by Per G Tube route every 4 (four) hours as needed for Pain or Temperature greater than (100.4F).  0    acetylcysteine 100 mg/ml, 10%, (MUCOMYST) 100 mg/mL (10 %)  Population Health Social Work Referral    Priority:Urgent    Reason for Social Work Referral:  • Transportation for medical needs (Z74.1)  • In-home services/Home Safety/Household Needs (Z74.2)      nebulizer solution Take 4 mLs by nebulization every 8 (eight) hours.  12    albuterol-ipratropium (DUO-NEB) 2.5 mg-0.5 mg/3 mL nebulizer solution Take 3 mLs by nebulization every 6 (six) hours. Rescue 1 Box 0    arginine/glutamine/calcium bmb (XIOMARA ORAL) 1 Package by PEG Tube route 2 (two) times a day. In 8oz of water      cycloSPORINE (RESTASIS) 0.05 % ophthalmic emulsion 1 drop 2 (two) times daily. 0900  2100      dextran 70-hypromellose (ARTIFICIAL TEARS,[id number],) ophthalmic solution Place 1 drop into both eyes 4 (four) times daily as needed.      ergocalciferol (ERGOCALCIFEROL) 50,000 unit Cap Take 50,000 Units by mouth every Saturday.      ipratropium (ATROVENT HFA) 17 mcg/actuation inhaler Inhale 2 puffs 4 times a day by inhalation route as needed.      lactose-reduced food (ISOSOURCE ORAL) 1.5 mg by Per G Tube route continuous. 1.5 per peg tube at 50ml/hr continuously      linaCLOtide (LINZESS) 145 mcg Cap capsule Take 145 mcg by mouth before breakfast.      metoclopramide HCl (REGLAN) 10 MG tablet Take 1 tablet 3 times a day by oral route.      metoprolol tartrate (LOPRESSOR) 50 MG tablet 0.5 tablets (25 mg total) by Per G Tube route 2 (two) times daily. 30 tablet 11    miconazole (MICOTIN) 2 % cream Apply topically 2 (two) times daily. Reason:  Tinea pedis for 14 days  0    midodrine (PROAMATINE) 5 MG Tab Take 1 tablet 3 times a day by oral route.      ondansetron (ZOFRAN) 4 MG tablet 4 mg by Per G Tube route every 8 (eight) hours as needed for Nausea.      ondansetron (ZOFRAN-ODT) 4 MG TbDL Take by mouth.      polyethylene glycol (GLYCOLAX) 17 gram PwPk Take 17 g by mouth daily as needed.      polyvinyl alcohol-povidone (CLEAR EYES NATURAL TEARS) 0.5-0.6 % Drop Apply 1 drop to eye daily as needed.      promethazine (PHENERGAN) 12.5 MG Tab Take 1 tablet 4 times a day by oral route as needed.      scopolamine (TRANSDERM-SCOP) 1.3-1.5 mg (1 mg over 3 days) scopolamine 1 mg over 3 days transdermal  "patch   APPLY 1 PATCH BY TRANSDERMAL ROUTE TO THE HAIRLESS AREA BEHIND 1 EAR AT LEAST 4 HR BEFORE EFFECT IS REQUIRED; REAPPLY EVERY 3 DAYS AS NEEDED      [DISCONTINUED] clonazePAM (KLONOPIN) 0.5 MG tablet Take 0.5 mg by mouth 2 (two) times daily.      [DISCONTINUED] oxybutynin (DITROPAN) 5 MG Tab Take 5 mg by mouth 2 (two) times daily.         ROS: As per HPI and below:  Review of systems not obtained due to patient factors.      Physical Exam:     Vitals:    23 2000 23 2100 23 2200 23 2300   BP: 113/60 127/76 93/68 94/65   BP Location:       Patient Position:       Pulse: (!) 59 62 (!) 57 (!) 57   Resp: (!) 30 (!) 26 (!) 24 (!) 24   Temp:       TempSrc:       SpO2: 100% 99% 100% 100%   Weight:       Height:           BP  Min: 75/52  Max: 127/76  Temp  Av.6 °F (36.4 °C)  Min: 92.2 °F (33.4 °C)  Max: 101.2 °F (38.4 °C)  Pulse  Av.9  Min: 53  Max: 113  Resp  Av.6  Min: 11  Max: 37  SpO2  Av.3 %  Min: 95 %  Max: 100 %  Height  Av' 1.5" (156.2 cm)  Min: 5' (152.4 cm)  Max: 5' 3" (160 cm)  Weight  Av.4 kg (201 lb 8.6 oz)  Min: 86.5 kg (190 lb 11.2 oz)  Max: 97.1 kg (214 lb)    Body mass index is 33.86 kg/m².          General:             Well developed, well nourished, no apparent distress  HEENT:              NCAT, no JVD, mucous membranes moist, EOM intact  Cardiovascular:  Regular rate and rhythm, normal S1, normal S2, No murmurs, rubs, or gallops  Respiratory:        Normal breath sounds, no wheezes, no rales, no rhonchi  Abdomen:           Bowel sounds present, non tender, non distended, no masses, no hepatojugular reflux  Extremities:        No clubbing, no cyanosis, no edema  Vascular:            2+ b/l radial.  Peripheral pulses intact.  No carotid bruits.  Neurological:      No focal deficits  Skin:                   No obvious rashes or erythema, stage 2 pressure ulcer of the left buttock    Lab Results   Component Value Date    WBC 6.95 2023    HGB 7.6 " "(L) 08/07/2023    HCT 26.0 (L) 08/07/2023    MCV 90 08/07/2023     08/07/2023     No results found for: "CHOL"  No results found for: "HDL"  No results found for: "LDLCALC"  No results found for: "TRIG"  No results found for: "CHOLHDL"  CMP  Recent Labs   Lab 08/07/23  0337   GLU 93  93   CALCIUM 9.3  9.3   ALBUMIN 3.2*  3.2*   PROT 9.1*  9.1*     138   K 4.3  4.3   CO2 26  26     105   BUN 20  20   CREATININE 0.5  0.5   ALKPHOS 112  112   ALT 19  19   AST 15  15   BILITOT 0.7  0.7      Lab Results   Component Value Date    TSH 1.880 07/23/2023         Assessment and Recommendations       Diagnoses:    1. Stage 2 pressure ulcer of the left buttock POA    Plan:  1. Triad to the left buttock daily    Complexity:    low     "

## 2023-08-08 NOTE — CARE UPDATE
08/07/23 2000   Patient Assessment/Suction   Level of Consciousness (AVPU) alert   Respiratory Effort Normal;Unlabored   Expansion/Accessory Muscles/Retractions no use of accessory muscles   All Lung Fields Breath Sounds equal bilaterally;coarse   Rhythm/Pattern, Respiratory assisted mechanically   Cough Frequency with stimulation   Cough Type assisted   Suction Method oral;tracheal   $ Suction Charges Inline Suction Procedure Stat Charge   Secretions Amount large   Secretions Color cloudy;white   Secretions Characteristics thick   Skin Integrity   $ Wound Care Tech Time 15 min   Area Observed Neck under tracheostomy   Skin Appearance redness blanchable   Barrier used? Other (see comments)   Barrier Changed? Yes   PRE-TX-O2   Device (Oxygen Therapy) ventilator   Oxygen Concentration (%) 40   SpO2 100 %   Pulse Oximetry Type Continuous   $ Pulse Oximetry - Multiple Charge Pulse Oximetry - Multiple   Pulse (!) 59   Resp (!) 30   /60   Adult Surgical Airway 07/27/23 1814 Shiley 8.0 mm DIC cuffed Extra Large Cuffed Distal 8.0 / 85 mm 8.0   Placement Date/Time: 07/27/23 1814   Present Prior to Hospital Arrival?: No  Inserted by: MD  Placed By: ICU physician  Type: Tracheostomy  Brand: Shiley  Airway Device Size and Style: 8.0 mm DIC cuffed  Airway Device Style: Extra Large Cuffed Distal ...   Cuff Pressure   (MLT)   Cuff Inflation? Inflated   Status Secured   Site Assessment Clean;Dry   Site Care Cleansed;Dried;Dressing applied   Inner Cannula Care Cleansed/dried   Ties Assessment Clean;Dry;Intact;Secure   Airway Safety   Is Ambu Bag and Mask with Patient? Yes, Adult Ambu Bag and Mask   Respiratory Interventions   Airway/Ventilation Management airway patency maintained   Vent Select   Conventional Vent Y   Charged w/in last 24h YES   Preset Conventional Ventilator Settings   Vent ID [id number]   Vent Type    Ventilation Type VC   Vent Mode A/C   Humidity HME   Set Rate 24 BPM   Vt Set 365 mL   PEEP/CPAP 8 cmH20    Peak Flow 45 L/min   Peak End Inspiratory Pressure 32 cmH20   I-Trigger Type  V-TRIG   Trigger Sensitivity Flow/I-Trigger 3 L/min   Patient Ventilator Parameters   Resp Rate Total 25 br/min   Peak Airway Pressure 34 cmH20   Mean Airway Pressure 18 cmH20   Plateau Pressure 27 cmH20   Exhaled Vt 364 mL   Total Ve 9.54 L/m   I:E Ratio Measured 1:1.80   Auto PEEP 4.5 cmH20   Tubing ID (mm) 8 mm   Tube Type Trach   Conventional Ventilator Alarms   Alarms On Y   Resp Rate High Alarm 50 br/min   Press High Alarm 60 cmH2O   Apnea Rate 24   Apnea Volume (mL) 0 mL   Apnea Oxygen Concentration  100   Apnea Flow Rate (L/min) 55   T Apnea 20 sec(s)   Ready to Wean/Extubation Screen   FIO2<=50 (chart decimal) 0.4   MV<16L (chart vol.) 9.54   PEEP <=8 (chart #) 8   Ready to Wean Parameters   F/VT Ratio<105 (RSBI) (!) 82.42   Vital Capacity   Vital Capacity (mL) 0   Education   $ Education Bronchodilator;Suction;Trach Care;Ventilator Oxygen;15 min   Respiratory Evaluation   $ Care Plan Tech Time 15 min   $ Eval/Re-eval Charges Re-evaluation   Evaluation For Re-Eval 5+ day

## 2023-08-08 NOTE — PLAN OF CARE
DC orders sent to [facility] for review via [facility].     08/08/23 1259   Post-Acute Status   Post-Acute Authorization Placement   Post-Acute Placement Status Pending post-acute provider review/more information requested

## 2023-08-08 NOTE — PLAN OF CARE
Pt asleep at time. IMM completed via phone with Pt's sister (Amelia Hidalgo (Sister) [phone] (Mobile)).       08/08/23 1233   Medicare Message   Important Message from Medicare regarding Discharge Appeal Rights Given to patient/caregiver;Explained to patient/caregiver;Other (comments)  (Verbal obtained from sister (Amelia Hidalgo (Sister)   [phone] (Mobile)))   Date IMM was signed 08/08/23   Time IMM was signed [id number]

## 2023-08-08 NOTE — NURSING
Report attempted to be called for second time to [facility] with no answer, unable to leave voicemail.  Case management contacted to verify correct number.

## 2023-08-08 NOTE — CARE UPDATE
08/08/23 0750   Patient Assessment/Suction   Level of Consciousness (AVPU) responds to pain   Respiratory Effort Unlabored   Expansion/Accessory Muscles/Retractions expansion symmetric;no retractions   All Lung Fields Breath Sounds coarse   Rhythm/Pattern, Respiratory assisted mechanically   Cough Frequency with stimulation   Cough Type assisted   Suction Method oral;tracheal   Suction Pressure (mmHg) -120 mmHg   $ Suction Charges Inline Suction Procedure Stat Charge   Secretions Amount moderate   Secretions Color pale;yellow   Secretions Characteristics thick   PRE-TX-O2   Device (Oxygen Therapy) ventilator   $ Is the patient on Low Flow Oxygen? Yes   Oxygen Concentration (%) 40   SpO2 97 %   Pulse Oximetry Type Continuous   $ Pulse Oximetry - Single Charge Pulse Oximetry - Single   Pulse (!) 59   Resp (!) 24   Aerosol Therapy   $ Aerosol Therapy Charges Aerosol Treatment   Daily Review of Necessity (SVN) completed   Respiratory Treatment Status (SVN) given   Treatment Route (SVN) in-line;ventilator   Patient Position (SVN) semi-Le's   Post Treatment Assessment (SVN) breath sounds unchanged   Signs of Intolerance (SVN) none   Breath Sounds Post-Respiratory Treatment   Throughout All Fields Post-Treatment All Fields   Throughout All Fields Post-Treatment aeration increased   Post-treatment Heart Rate (beats/min) 61   Post-treatment Resp Rate (breaths/min) 24   Adult Surgical Airway 07/27/23 1814 Shiley 8.0 mm DIC cuffed Extra Large Cuffed Distal 8.0 / 85 mm 8.0   Placement Date/Time: 07/27/23 1814   Present Prior to Hospital Arrival?: No  Inserted by: MD  Placed By: ICU physician  Type: Tracheostomy  Brand: Shiley  Airway Device Size and Style: 8.0 mm DIC cuffed  Airway Device Style: Extra Large Cuffed Distal ...   Cuff Pressure   (MLT)   Cuff Inflation? Inflated   Speaking Valve Usage Not wearing   Status Secured   Site Assessment Dry;Clean   Site Care Protective barrier to skin   Inner Cannula Care Other  (Comment)  (will change with trach care)   Ties Assessment Clean;Dry   Airway Safety   Is Ambu Bag and Mask with Patient? Yes, Adult Ambu Bag and Mask   Trach Supplies at Bedside 10cc Syringes;Inner Cannula;Obturator;Ambu Bag and Mask (in patients room)   Suction set is at the bedside? Yes   Extra trach at bedside? Yes   Extra trach sizes at bedside? 6;8   Is Obturator Available? Yes   Location of Obturator?  Bedside table   Respiratory Interventions   Cough And Deep Breathing unable to perform   Airway/Ventilation Management airway patency maintained   NPPV/CPAP Maintenance tubes secured   Vent Select   Conventional Vent Y   Charged w/in last 24h YES   Preset Conventional Ventilator Settings   Vent ID [id number]   Vent Type    Ventilation Type VC   Vent Mode A/C   Humidity HME   Set Rate 24 BPM   Vt Set 365 mL   PEEP/CPAP 8 cmH20   Peak Flow 45 L/min   Peak End Inspiratory Pressure 24 cmH20   I-Trigger Type  V-TRIG   Trigger Sensitivity Flow/I-Trigger 3 L/min   Patient Ventilator Parameters   Resp Rate Total 24 br/min   Peak Airway Pressure 25 cmH20   Mean Airway Pressure 14 cmH20   Plateau Pressure 27 cmH20   Exhaled Vt 382 mL   Total Ve 9.16 L/m   I:E Ratio Measured 1:1.80   Auto PEEP 4.5 cmH20   Conventional Ventilator Alarms   Alarms On Y   Ve High Alarm 22 L/min   Ve Low Alarm 3 L/min   Vt High Alarm 1200 mL   Vt Low Alarm 200 mL   Resp Rate High Alarm 50 br/min   Press High Alarm 60 cmH2O   Apnea Rate 24   Apnea Volume (mL) 0 mL   Apnea Oxygen Concentration  100   Apnea Flow Rate (L/min) 55   T Apnea 20 sec(s)   IHI Ventilator Associated Pneumonia Bundle (Required)   Daily Awakening Trials Performed Not applicable   Daily Assessment of Readiness to Extubate Yes   Head of Bed Elevated  HOB 45   Oral Care Mouth suctioned   Vent Circut Breaks Minimized Yes   Ready to Wean/Extubation Screen   FIO2<=50 (chart decimal) 0.4   MV<16L (chart vol.) 9.16   PEEP <=8 (chart #) 8   Education   $ Education Bronchodilator;15  min;Ventilator Oxygen

## 2023-08-09 NOTE — NURSING
EMS arrived, PIV's removed, pt transferred to [facility] and patient left in care of EMS,

## 2023-08-09 NOTE — DISCHARGE SUMMARY
Formerly [facility] Medicine  Discharge Summary      Patient Name: Genna Felix  MRN: [id number]  [facility]: [id number]  Patient Class: IP- Inpatient  Admission Date: 8/3/2023  Hospital Length of Stay: 5 days  Discharge Date and Time: 8/8/2023  7:43 PM  Attending Physician: Nikky att. providers found   Discharging Provider: Tolu Cross MD  Primary Care Provider: Nikky, Primary Doctor    Primary Care Team: Networked reference to record PCT     HPI:   Genna Felix is a 62 year old female with a past medical history of anoxic brain injury with quadriplegia, tracheostomy and G tube, PSVT, obesity, stage two sacral wound, HTN, anemia and right shoulder dislocation with recent admission for acute on chronic respiratory failure with CRP Pseudomonas VAP requiring new mechanical ventilation who presents back from [facility] as the patient has apparently been unable to complete Zerbaxa therapy as this antibiotic is not available at that facility. She will be admitted to the ICU and complete four more days of Zerbaxa per prior ID recommendations.      * No surgery found *      Hospital Course:   Genna Felix is a 62 year old female with a past medical history of anoxic brain injury s/p G tube and tracheostomy, chronic respiratory failure, PE, GERD, HTN, hepatic steatosis, obesity, and anemia who presented with severe sepsis with acute respiratory failure requiring mechanical ventilation. Respiratory cultures showed  Pseudomonas. ID has been consulted and started Avycaz. Pulmonary was also consulted for ventilator management. Her course was complicated by tracheostomy cuff leak; Pulmonary replaced the cuff 7/27. She also developed SVT for which scheduled metoprolol has been ordered. Cardiology was consulted. She also was noted to have a right shoulder dislocation on CTA for which Orthopedic Surgery has been consulted; no intervention recommended at this time. She has been unable to be weaned from mechanical ventilation  at this time;. She was discharged with a five day course of Zerbaxa 8/1/2023 only to return 8/3/2023 as [facility] did not have Zerbaxa to administer.  Patient completed the course of 6 days of Zerbaxa and was discharged back to [facility] for further ongoing care.       Goals of Care Treatment Preferences:  Code Status: Full Code       LaPOST: Yes           Consults:   Consults (From admission, onward)        Status Ordering Provider     Inpatient consult to Registered Dietitian/Nutritionist  Once        Provider:  (Not yet assigned)    Completed CAROLYNN SMITH     Inpatient consult to Orthopedic Surgery  Once        Provider:  Tee Hernández II, MD    Completed CAROLYNN SMITH          No new Assessment & Plan notes have been filed under this hospital service since the last note was generated.  Service: Hospital Medicine    Final Active Diagnoses:    Diagnosis Date Noted POA    PRINCIPAL PROBLEM:  Chronic respiratory failure [J96.10] 07/29/2022 Yes    Pneumonia of both lungs due to infectious organism [J18.9] 07/22/2023 Yes    Multiple drug resistant organism (MDRO) culture positive [Z16.24] 07/06/2023 Yes    Anemia of chronic disease [D63.8] 04/21/2021 Yes    Autonomic instability [G90.9] 08/06/2023 Yes    Functional quadriplegia [R53.2] 08/05/2023 Yes    Paroxysmal SVT (supraventricular tachycardia) [I47.1] 07/28/2023 Yes    Hepatic steatosis [K76.0] 07/26/2023 Yes    Obesity (BMI 30-39.9) [E66.9] 07/26/2023 Yes    Pressure injury of buttock, stage 2 [L89.302] 07/24/2023 Yes    Wounds, multiple [T07.XXXA] 08/22/2022 Yes    Difficult Hamilton catheter placement [T83.9XXA] 10/25/2021 Yes    Essential hypertension [I10] 10/24/2021 Yes    PEG (percutaneous endoscopic gastrostomy) status [Z93.1] 05/01/2021 Not Applicable    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] 05/01/2021 Not Applicable    Dislocation of right shoulder joint [S43.004A] 04/22/2021 Yes    Persistent vegetative state [R40.3]  04/21/2021 Yes      Problems Resolved During this Admission:    Diagnosis Date Noted Date Resolved POA    On tube feeding diet [Z78.9] 11/13/2020 08/05/2023 Yes       Discharged Condition: stable    Disposition: alf Nursing Home    Follow Up:    Patient Instructions:      Diet NPO     Notify your health care provider if you experience any of the following:  temperature >100.4     Notify your health care provider if you experience any of the following:  difficulty breathing or increased cough     Notify your health care provider if you experience any of the following:  increased confusion or weakness     Activity as tolerated       Significant Diagnostic Studies: N/A    Pending Diagnostic Studies:     None         Medications:  Reconciled Home Medications:      Medication List      CONTINUE taking these medications    acetaminophen 325 MG tablet  Commonly known as: TYLENOL  2 tablets (650 mg total) by Per G Tube route every 4 (four) hours as needed for Pain or Temperature greater than (100.4F).     acetylcysteine 100 mg/ml (10%) 100 mg/mL (10 %) nebulizer solution  Commonly known as: MUCOMYST  Take 4 mLs by nebulization every 8 (eight) hours.     albuterol-ipratropium 2.5 mg-0.5 mg/3 mL nebulizer solution  Commonly known as: DUO-NEB  Take 3 mLs by nebulization every 6 (six) hours. Rescue     ARTIFICIAL TEARS([id number]) ophthalmic solution  Generic drug: dextran 70-hypromellose  Place 1 drop into both eyes 4 (four) times daily as needed.     ATROVENT HFA 17 mcg/actuation inhaler  Generic drug: ipratropium  Inhale 2 puffs 4 times a day by inhalation route as needed.     CLEAR EYES NATURAL TEARS 0.5-0.6 % Drop  Generic drug: polyvinyl alcohol-povidone  Apply 1 drop to eye daily as needed.     cycloSPORINE 0.05 % ophthalmic emulsion  Commonly known as: RESTASIS  1 drop 2 (two) times daily. 0900  2100     ergocalciferol 50,000 unit Cap  Commonly known as: ERGOCALCIFEROL  Take 50,000 Units by mouth every  Saturday.     ISOSOURCE ORAL  1.5 mg by Per G Tube route continuous. 1.5 per peg tube at 50ml/hr continuously     XIOMARA ORAL  1 Package by PEG Tube route 2 (two) times a day. In 8oz of water     LINZESS 145 mcg Cap capsule  Generic drug: linaCLOtide  Take 145 mcg by mouth before breakfast.     metoclopramide HCl 10 MG tablet  Commonly known as: REGLAN  Take 1 tablet 3 times a day by oral route.     metoprolol tartrate 50 MG tablet  Commonly known as: LOPRESSOR  0.5 tablets (25 mg total) by Per G Tube route 2 (two) times daily.     miconazole 2 % cream  Commonly known as: MICOTIN  Apply topically 2 (two) times daily. Reason:  Tinea pedis for 14 days     midodrine 5 MG Tab  Commonly known as: PROAMATINE  Take 1 tablet 3 times a day by oral route.     ondansetron 4 MG tablet  Commonly known as: ZOFRAN  4 mg by Per G Tube route every 8 (eight) hours as needed for Nausea.     ondansetron 4 MG Tbdl  Commonly known as: ZOFRAN-ODT  Take by mouth.     polyethylene glycol 17 gram Pwpk  Commonly known as: GLYCOLAX  Take 17 g by mouth daily as needed.     promethazine 12.5 MG Tab  Commonly known as: PHENERGAN  Take 1 tablet 4 times a day by oral route as needed.     scopolamine 1.3-1.5 mg (1 mg over 3 days)  Commonly known as: TRANSDERM-SCOP  scopolamine 1 mg over 3 days transdermal patch   APPLY 1 PATCH BY TRANSDERMAL ROUTE TO THE HAIRLESS AREA BEHIND 1 EAR AT LEAST 4 HR BEFORE EFFECT IS REQUIRED; REAPPLY EVERY 3 DAYS AS NEEDED        ASK your doctor about these medications    dextrose 5 % (D5W) SolP 100 mL with ceftolozane-tazobactam 1.5 gram SolR 3,000 mg injection  Inject 3,000 mg into the vein every 8 (eight) hours. for 4 days  Ask about: Should I take this medication?            Indwelling Lines/Drains at time of discharge:   Lines/Drains/Airways     Drain  Duration                Gastrostomy/Enterostomy LUQ -- days         Biliary Tube 01/13/22 573 days          Airway  Duration           Adult Surgical Airway 07/27/23  1814 Philip 8.0 mm DIC cuffed Extra Large Cuffed Distal 8.0 / 85 mm 8.0 12 days                Time spent on the discharge of patient: 36 minutes         Tolu Cross MD  Department of Hospital Medicine  [facility]

## 2023-11-06 PROBLEM — J18.9 PNEUMONIA OF BOTH LUNGS DUE TO INFECTIOUS ORGANISM: Status: RESOLVED | Noted: 2023-07-22 | Resolved: 2023-11-06

## 2023-11-06 PROBLEM — J96.10 CHRONIC RESPIRATORY FAILURE: Status: RESOLVED | Noted: 2022-07-29 | Resolved: 2023-11-06

## 2025-02-26 NOTE — ASSESSMENT & PLAN NOTE
-Wound Care consulted     PROCEDURES:  Breast reduction, bilateral 26-Feb-2025 11:05:45  Bailey Shook